# Patient Record
Sex: FEMALE | Race: BLACK OR AFRICAN AMERICAN | NOT HISPANIC OR LATINO | Employment: PART TIME | URBAN - METROPOLITAN AREA
[De-identification: names, ages, dates, MRNs, and addresses within clinical notes are randomized per-mention and may not be internally consistent; named-entity substitution may affect disease eponyms.]

---

## 2018-06-15 ENCOUNTER — CONVERSION ENCOUNTER (OUTPATIENT)
Dept: MAMMOGRAPHY | Facility: CLINIC | Age: 63
End: 2018-06-15

## 2018-06-27 ENCOUNTER — TELEPHONE (OUTPATIENT)
Dept: FAMILY MEDICINE CLINIC | Facility: CLINIC | Age: 63
End: 2018-06-27

## 2018-06-27 DIAGNOSIS — R92.2 INCONCLUSIVE MAMMOGRAPHY: Primary | ICD-10-CM

## 2018-06-28 ENCOUNTER — TELEPHONE (OUTPATIENT)
Dept: FAMILY MEDICINE CLINIC | Facility: CLINIC | Age: 63
End: 2018-06-28

## 2018-06-29 DIAGNOSIS — N63.10 MASS OF BREAST, RIGHT: Primary | ICD-10-CM

## 2018-08-02 ENCOUNTER — TELEPHONE (OUTPATIENT)
Dept: OBGYN CLINIC | Facility: CLINIC | Age: 63
End: 2018-08-02

## 2018-08-08 PROBLEM — H49.20 ABDUCENS NERVE PALSY: Status: ACTIVE | Noted: 2017-08-24

## 2018-08-08 PROBLEM — D53.1 MEGALOBLASTIC ANEMIA DUE TO VITAMIN B12 DEFICIENCY: Status: ACTIVE | Noted: 2017-08-24

## 2018-08-08 PROBLEM — I10 BENIGN ESSENTIAL HYPERTENSION: Status: ACTIVE | Noted: 2017-07-27

## 2018-08-08 RX ORDER — LISINOPRIL 20 MG/1
TABLET ORAL
COMMUNITY
Start: 2018-06-29 | End: 2018-08-09 | Stop reason: SINTOL

## 2018-08-08 RX ORDER — FOLIC ACID 1 MG/1
TABLET ORAL EVERY 24 HOURS
COMMUNITY
Start: 2018-04-25 | End: 2018-08-09 | Stop reason: SDUPTHER

## 2018-08-08 RX ORDER — ATORVASTATIN CALCIUM 80 MG/1
TABLET, FILM COATED ORAL EVERY 24 HOURS
COMMUNITY
Start: 2018-04-25 | End: 2018-08-09 | Stop reason: SDUPTHER

## 2018-08-09 ENCOUNTER — OFFICE VISIT (OUTPATIENT)
Dept: FAMILY MEDICINE CLINIC | Facility: CLINIC | Age: 63
End: 2018-08-09
Payer: COMMERCIAL

## 2018-08-09 VITALS
HEIGHT: 66 IN | TEMPERATURE: 96.1 F | HEART RATE: 81 BPM | WEIGHT: 267 LBS | RESPIRATION RATE: 16 BRPM | BODY MASS INDEX: 42.91 KG/M2 | DIASTOLIC BLOOD PRESSURE: 100 MMHG | OXYGEN SATURATION: 98 % | SYSTOLIC BLOOD PRESSURE: 144 MMHG

## 2018-08-09 DIAGNOSIS — Z12.4 ENCOUNTER FOR SCREENING FOR CERVICAL CANCER: ICD-10-CM

## 2018-08-09 DIAGNOSIS — Z86.2 HISTORY OF ANEMIA: ICD-10-CM

## 2018-08-09 DIAGNOSIS — J30.0 VASOMOTOR RHINITIS: ICD-10-CM

## 2018-08-09 DIAGNOSIS — Z12.11 ENCOUNTER FOR SCREENING COLONOSCOPY: ICD-10-CM

## 2018-08-09 DIAGNOSIS — Z78.9 ACE INHIBITOR INTOLERANCE: ICD-10-CM

## 2018-08-09 DIAGNOSIS — I10 BENIGN ESSENTIAL HYPERTENSION: Primary | ICD-10-CM

## 2018-08-09 DIAGNOSIS — E78.2 MIXED HYPERLIPIDEMIA: ICD-10-CM

## 2018-08-09 PROBLEM — D53.1 MEGALOBLASTIC ANEMIA DUE TO VITAMIN B12 DEFICIENCY: Status: RESOLVED | Noted: 2017-08-24 | Resolved: 2018-08-09

## 2018-08-09 PROBLEM — H49.20 ABDUCENS NERVE PALSY: Status: RESOLVED | Noted: 2017-08-24 | Resolved: 2018-08-09

## 2018-08-09 PROCEDURE — 99214 OFFICE O/P EST MOD 30 MIN: CPT | Performed by: FAMILY MEDICINE

## 2018-08-09 PROCEDURE — 3008F BODY MASS INDEX DOCD: CPT | Performed by: FAMILY MEDICINE

## 2018-08-09 PROCEDURE — 3725F SCREEN DEPRESSION PERFORMED: CPT | Performed by: FAMILY MEDICINE

## 2018-08-09 RX ORDER — ASPIRIN 81 MG/1
81 TABLET ORAL DAILY
Qty: 30 TABLET | Refills: 0
Start: 2018-08-09 | End: 2018-12-11

## 2018-08-09 RX ORDER — LOSARTAN POTASSIUM 50 MG/1
50 TABLET ORAL DAILY
Qty: 30 TABLET | Refills: 3 | Status: SHIPPED | OUTPATIENT
Start: 2018-08-09 | End: 2018-12-11 | Stop reason: SDUPTHER

## 2018-08-09 RX ORDER — FOLIC ACID 1 MG/1
1 TABLET ORAL EVERY 24 HOURS
Qty: 30 TABLET | Refills: 3 | Status: SHIPPED | OUTPATIENT
Start: 2018-08-09 | End: 2018-12-11 | Stop reason: SDUPTHER

## 2018-08-09 RX ORDER — FLUTICASONE PROPIONATE 50 MCG
2 SPRAY, SUSPENSION (ML) NASAL DAILY
Qty: 16 G | Refills: 1 | Status: SHIPPED | OUTPATIENT
Start: 2018-08-09 | End: 2018-11-21 | Stop reason: SDUPTHER

## 2018-08-09 RX ORDER — ATORVASTATIN CALCIUM 80 MG/1
80 TABLET, FILM COATED ORAL EVERY 24 HOURS
Qty: 30 TABLET | Refills: 3 | Status: SHIPPED | OUTPATIENT
Start: 2018-08-09 | End: 2018-12-11 | Stop reason: SDUPTHER

## 2018-08-09 RX ORDER — LISINOPRIL 20 MG/1
20 TABLET ORAL DAILY
Qty: 30 TABLET | Refills: 3 | Status: CANCELLED | OUTPATIENT
Start: 2018-08-09

## 2018-08-09 NOTE — ASSESSMENT & PLAN NOTE
-Suboptimal control due to running out of medication  -Will change medications due to possible ACE inhibitor cough  -Follow-up in 6 weeks to reevaluate blood pressure  -Handout given for DASH diet

## 2018-08-09 NOTE — PROGRESS NOTES
Patient ID: Wero Duran is a 59 y o  female  Chief Complaint: "Ran out of medication "    ASSESSMENT/PLAN    Notes for this visit:    ACE inhibitor intolerance  -Dry cough  -Stop lisinopril, start losartan    Benign essential hypertension  -Suboptimal control due to running out of medication  -Will change medications due to possible ACE inhibitor cough  -Follow-up in 6 weeks to reevaluate blood pressure  -Handout given for DASH diet    Encounter for screening for cervical cancer   -Risks and benefits explained in detail  Patient agrees   -Patient agrees to have PAP smear done in office  Vasomotor rhinitis  -Start flonase  -Will consider atrovent 0 03% if symptoms persist      Beccapolinaannetta Gaspar was seen today for hypertension  Diagnoses and all orders for this visit:    Benign essential hypertension  -     Comprehensive metabolic panel; Future  -     UA w Reflex to Microscopic w Reflex to Culture; Future  -     losartan (COZAAR) 50 mg tablet; Take 1 tablet (50 mg total) by mouth daily    History of anemia  -     CBC and differential; Future  -     Vitamin B12/Folate, Serum Panel; Future    Mixed hyperlipidemia  -     Lipid panel; Future  -     Comprehensive metabolic panel; Future    Encounter for screening colonoscopy  -     Ambulatory referral to Gastroenterology; Future    Vasomotor rhinitis  -     fluticasone (FLONASE) 50 mcg/act nasal spray; 2 sprays into each nostril daily      SUBJECTIVE    Patient here for refill of medication  Ran out of lisinopril 2 days ago  Also complains of right sided eye and nasal watering in cold rooms  Occurs daily  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, 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  Associated with post-nasal drip, dry coughing, and occasional sneezing  Denies eye redness or burning  She has tried sudafed when her symptoms are very bothersome, which resolves her symptoms      Reports that dry cough can occur anywhere, isolated from above symptoms  Coughs intermittently throughout day but has coughing spells at night  This started after she started lisinopril in July 2017  The following portions of the patient's history were reviewed and updated as appropriate: allergies, current medications, past family history, past medical history, past social history, past surgical history and problem list     Review of Systems   Constitutional: Negative for chills, fatigue and fever  Respiratory: Negative for shortness of breath  Cardiovascular: Negative for chest pain  Gastrointestinal: Negative for blood in stool, constipation, diarrhea, nausea and vomiting  OBJECTIVE    Vitals: Blood pressure 144/100, pulse 81, temperature (!) 96 1 °F (35 6 °C), temperature source Tympanic, resp  rate 16, height 5' 6" (1 676 m), weight 121 kg (267 lb), SpO2 98 %  Physical Exam   Constitutional: She is oriented to person, place, and time  She appears well-developed and well-nourished  No distress  HENT:   Head: Normocephalic and atraumatic  Eyes: EOM are normal  Pupils are equal, round, and reactive to light  Cardiovascular: Normal rate, regular rhythm and normal heart sounds  Exam reveals no gallop and no friction rub  No murmur heard  Pulmonary/Chest: Effort normal and breath sounds normal  No respiratory distress  She has no wheezes  She has no rales  Musculoskeletal: Normal range of motion  Neurological: She is alert and oriented to person, place, and time  Gait normal    Skin: Skin is warm and dry  Psychiatric: She has a normal mood and affect  Her behavior is normal  Judgment and thought content normal    Vitals reviewed

## 2018-08-09 NOTE — ASSESSMENT & PLAN NOTE
-Risks and benefits explained in detail  Patient agrees   -Patient agrees to have PAP smear done in office

## 2018-08-09 NOTE — PATIENT INSTRUCTIONS
DASH Eating Plan   AMBULATORY CARE:   The DASH (Dietary Approaches to Stop Hypertension) Eating Plan  is designed to help prevent or lower high blood pressure  It can also help to lower LDL (bad) cholesterol and decrease your risk for heart disease  The plan is low in sodium, sugar, unhealthy fats, and total fat  It is high in potassium, calcium, magnesium, and fiber  These nutrients are added when you eat more fruits, vegetables, and whole grains  Your sodium limit each day: Your dietitian will tell you how much sodium is safe for you to have each day  People with high blood pressure should have no more than 1,500 to 2,300 mg of sodium in a day  A teaspoon (tsp) of salt has 2,300 mg of sodium  This may seem like a difficult goal, but small changes to the foods you eat can make a big difference  Your healthcare provider or dietitian can help you create a meal plan that follows your sodium limit  How to limit sodium:   · Read food labels  Food labels can help you choose foods that are low in sodium  The amount of sodium is listed in milligrams (mg)  The % Daily Value (DV) column tells you how much of your daily needs are met by 1 serving of the food for each nutrient listed  Choose foods that have less than 5% of the DV of sodium  These foods are considered low in sodium  Foods that have 20% or more of the DV of sodium are considered high in sodium  Avoid foods that have more than 300 mg of sodium in each serving  Choose foods that say low-sodium, reduced-sodium, or no salt added on the food label  · Avoid salt  Do not salt food at the table, and add very little salt to foods during cooking  Use herbs and spices, such as onions, garlic, and salt-free seasonings to add flavor to foods  Try lemon or lime juice or vinegar to give foods a tart flavor  Use hot peppers or a small amount of hot pepper sauce to add a spicy flavor to foods  · Ask about salt substitutes    Ask your healthcare provider if you may use salt substitutes  Some salt substitutes have ingredients that can be harmful if you have certain health conditions  · Choose foods carefully at restaurants  Meals from restaurants, especially fast food restaurants, are often high in sodium  Some restaurants have nutrition information that tells you the amount of sodium in their foods  Ask to have your food prepared with less, or no salt  What you need to know about fats:   · Include healthy fats  Examples are unsaturated fats and omega-3 fatty acids  Unsaturated fats are found in soybean, canola, olive, or sunflower oil, and liquid and soft tub margarines  Omega-3 fatty acids are found in fatty fish, such as salmon, tuna, mackerel, and sardines  It is also found in flaxseed oil and ground flaxseed  · Avoid unhealthy fats  Do not eat unhealthy fats, such as saturated fats and trans fats  Saturated fats are found in foods that contain fat from animals  Examples are fatty meats, whole milk, butter, cream, and other dairy foods  It is also found in shortening, stick margarine, palm oil, and coconut oil  Trans fats are found in fried foods, crackers, chips, and baked goods made with margarine or shortening  Foods to include: With the DASH eating plan, you need to eat a certain number of servings from each food group  This will help you get enough of certain nutrients and limit others  The amount of servings you should eat depends on how many calories you need  Your dietitian can tell you how many calories you need  The number of servings listed next to the food groups below are for people who need about 2,000 calories each day    · Grains:  6 to 8 servings (3 of these servings should be whole-grain foods)    ¨ 1 slice of whole-grain bread     ¨ 1 ounce of dry cereal    ¨ ½ cup of cooked cereal, pasta, or brown rice    · Vegetables and fruits:  4 to 5 servings of fruits and 4 to 5 servings of vegetables    ¨ 1 medium fruit    ¨ ½ cup of frozen, canned (no added salt), or chopped fresh vegetables     ¨ ½ cup of fresh, frozen, dried, or canned fruit (canned in light syrup or fruit juice)    ¨ ½ cup of vegetable or fruit juice    · Dairy:  2 to 3 servings    ¨ 1 cup of nonfat (skim) or 1% milk    ¨ 1½ ounces of fat-free or low-fat cheese    ¨ 6 ounces of nonfat or low-fat yogurt    · Lean meat, poultry, and fish:  6 ounces or less    [employer] (chicken, turkey) with no skin    ¨ Fish (especially fatty fish, such as salmon, fresh tuna, or mackerel)    ¨ Lean beef and pork (loin, round, extra lean hamburger)    ¨ Egg whites and egg substitutes    · Nuts, seeds, and legumes:  4 to 5 servings each week    ¨ ½ cup of cooked beans and peas    ¨ 1½ ounces of unsalted nuts    ¨ 2 tablespoons of peanut butter or seeds    · Sweets and added sugars:  5 or less each week    ¨ 1 tablespoon of sugar, jelly, or jam    ¨ ½ cup of sorbet or gelatin    ¨ 1 cup of lemonade    · Fats:  2 to 3 servings each week    ¨ 1 teaspoon of soft margarine or vegetable oil    ¨ 1 tablespoon of mayonnaise    ¨ 2 tablespoons of salad dressing  Foods to avoid:   · Grains:      [employer], such as doughnuts, pastries, cookies, and biscuits (high in fat and sugar)    ¨ Mixes for cornbread and biscuits, packaged foods, such as bread stuffing, rice and pasta mixes, macaroni and cheese, and instant cereals (high in sodium)    · Fruits and vegetables:      ¨ Regular, canned vegetables (high in sodium)    ¨ Sauerkraut, pickled vegetables, and other foods prepared in brine (high in sodium)    ¨ Fried vegetables or vegetables in butter or high-fat sauces    ¨ Fruit in cream or butter sauce (high in fat)    · Dairy:      ¨ Whole milk, 2% milk, and cream (high in fat)    ¨ Regular cheese and processed cheese (high in fat and sodium)    · Meats and protein foods:      ¨ Smoked or cured meat, such as corned beef, spears, ham, hot dogs, and sausage (high in fat and sodium)    ¨ Canned beans and canned meats or spreads, such as potted meats, sardines, anchovies, and imitation seafood (high in sodium)    ¨ Deli or lunch meats, such as bologna, ham, turkey, and roast beef (high in sodium)    ¨ High-fat meat (T-bone steak, regular hamburger, and ribs)    ¨ Whole eggs and egg yolks (high in fat)    · Other:      ¨ Seasonings made with salt, such as garlic salt, celery salt, onion salt, seasoned salt, meat tenderizers, and monosodium glutamate (MSG)    ¨ Miso soup and canned or dried soup mixes (high in sodium)    ¨ Regular soy sauce, barbecue sauce, teriyaki sauce, steak sauce, Worcestershire sauce, and most flavored vinegars (high in sodium)    ¨ Regular condiments, such as mustard, ketchup, and salad dressings (high in sodium)    ¨ Gravy and sauces, such as Roland or cheese sauces (high in sodium and fat)    ¨ Drinks high in sugar, such as soda or fruit drinks    [employer] foods, such as salted chips, popcorn, pretzels, pork rinds, salted crackers, and salted nuts    ¨ Frozen foods, such as dinners, entrees, vegetables with sauces, and breaded meats (high in sodium)  Other guidelines to follow:   · Maintain a healthy weight  Your risk for heart disease is higher if you are overweight  Your healthcare provider may suggest that you lose weight if you are overweight  You can lose weight by eating fewer calories and foods that have added sugars and fat  The DASH meal plan can help you do this  Decrease calories by eating smaller portions at each meal and fewer snacks  Ask your healthcare provider for more information about how to lose weight  · Exercise regularly  Regular exercise can help you reach or maintain a healthy weight  Regular exercise can also help decrease your blood pressure and improve your cholesterol levels  Get 30 minutes or more of moderate exercise each day of the week  To lose weight, get at least 60 minutes of exercise  Talk to your healthcare provider about the best exercise program for you      · Limit alcohol  Women should limit alcohol to 1 drink a day  Men should limit alcohol to 2 drinks a day  A drink of alcohol is 12 ounces of beer, 5 ounces of wine, or 1½ ounces of liquor  © 2017 [address] Information is for End User's use only and may not be sold, redistributed or otherwise used for commercial purposes  All illustrations and images included in CareNotes® are the copyrighted property of A D A M , Inc  or David Goodwin  The above information is an  only  It is not intended as medical advice for individual conditions or treatments  Talk to your doctor, nurse or pharmacist before following any medical regimen to see if it is safe and effective for you  Pap Smear   AMBULATORY CARE:   A Pap smear,  or Pap test, is used to screen for cervical cancer  It is also used to find precancerous and cancerous cells of the vulva and vagina  Prepare for a Pap smear: The best time to schedule the test is right after your period stops  Do not have a Pap smear during your monthly period  During a Pap smear:   · You will lie on your back and place your feet on footrests called stirrups  Your healthcare provider will gently insert a device called a speculum into your vagina  The speculum is used to open the walls of your vagina so he can see your cervix  · Your healthcare provider will gently scrape your cervix and vaginal areas for cell samples  The samples are placed in a container with liquid or on a glass slide  They are sent to a lab and examined for abnormal cells  A test for Human Papillomavirus (HPV) may be done at the same time  HPV is a sexually transmitted virus that can cause changes in cervical cells  After your Pap smear:  Your healthcare provider will tell you when you can expect your Pap smear results  You may have some spotting the day of your procedure  When to get a Pap smear:  Pap smears are usually done every 3 to 5 years depending on your age   You may need a Pap smear more often if you have any of the following:  · Positive test result for the human papillomavirus (HPV)    · A history of cervical cancer    · HIV    · A weak immune system    · Exposure to diethylstilbestrol (DELL) medicine when your mother was pregnant with you  © 2017 [address] [employer] Information is for End User's use only and may not be sold, redistributed or otherwise used for commercial purposes  All illustrations and images included in CareNotes® are the copyrighted property of [employer]  or David Goodwin  The above information is an  only  It is not intended as medical advice for individual conditions or treatments  Talk to your doctor, nurse or pharmacist before following any medical regimen to see if it is safe and effective for you  Colonoscopy   AMBULATORY CARE:   What you need to know about a colonoscopy:  A colonoscopy is a procedure to examine the inside of your colon (intestine) with a scope  A scope is a flexible tube with a small light and camera on the end  Polyps or tissue growths may be removed during your colonoscopy  What you need to do the week before your colonoscopy: You will need to stop taking medicines that contain aspirin or iron for 7 days before your colonoscopy  If you take anticoagulants, such as warfarin, ask when you should stop taking it  Make plans for someone to drive you home after your procedure  How to prepare for your colonoscopy: Your healthcare provider will have you prepare your bowels before your procedure  Your bowels will need to be empty before your procedure to allow him to clearly see your colon  You will need to do the following the day before your procedure:  · Have only clear liquids  for the entire day before your colonoscopy  Clear liquid diet includes clear fruit juices and broths, clear flavored gelatin, and hard candy   It also includes coffee, tea, carbonated beverages, and clear sports drinks  · Follow your bowel prep as directed  There are many different preparations that can be given before a colonoscopy  Some are given over 2 hours and others over 6 hours  Some are given earlier in the afternoon the day before the colonoscopy  Others are given the day before and then the morning of the colonoscopy  With any bowel prep, stay close to the bathroom  This liquid will cause your bowels to move frequently  · An enema  may be needed  Your healthcare provider may tell you to use an enema to help clean out your bowels  · Do not eat or drink anything after midnight  This will help prevent problems that can happen if you vomit while under anesthesia  What will happen during your colonoscopy:   · You will be given medicine to help you relax  You will lie on your left side and raise one or both knees toward your chest  Your healthcare provider will examine your anus and use a finger to check your rectum  You may need another enema if your bowel is not empty  The scope will be lubricated and gently placed into your anus  It will then be passed through your rectum and into your colon  Water or air will be put into your colon to help clean or expand it  This is done so your healthcare provider can see your colon clearly  · Tissue samples may be taken from the walls of your bowel and sent to a lab for tests  If you have a polyp, your healthcare provider will pass a wire loop through the scope and use it to hold the polyp  The polyp is then burned or cut off the wall of your colon  Removed polyps are sent to a lab for tests  Pictures of your colon may be taken during the procedure  The scope will be removed when the procedure is done  What will happen after your colonoscopy:   · Rest after your procedure  You may feel bloated, have some gas and abdominal discomfort  You may need to lie on your right side with a heating pad on your abdomen   You may need to take short walks to help move the gas out  Eat small meals, if you feel bloated  Do not drive or make important decisions until the day after your procedure  · You may have polyps removed  Do not take aspirin or go on long car trips for 7 days after your procedure  Ask your healthcare provider about any other limits after your procedure  Risks of a colonoscopy: You may have pain or bleeding after the scope or polyps are removed  You may also have a slow heartbeat, decreased blood pressure, or increased sweating  Your colon may tear due to the increased pressure from the scope and other instruments  This may cause bowel contents to leak out of your colon and into your abdomen  If this happens, you will need to stay in the hospital and have surgery on your colon  Seek care immediately if:   · You have a large amount of bright red blood in your bowel movements  · Your abdomen is hard and firm and you have severe pain  · You have sudden trouble breathing  Contact your healthcare provider if:   · You develop a rash or hives  · You have a fever within 24 hours of your procedure  · You have not had a bowel movement for 3 days after your procedure  · You have questions or concerns about your condition or care  Activity:   · Do not lift, strain, or run  for 3 days after your procedure  · Rest after your procedure  You have been given medicine to relax you  Do not  drive or make important decisions until the day after your procedure  Return to your normal activity as directed  · Relieve gas and discomfort from bloating  by lying on your right side with a heating pad on your abdomen  You may need to take short walks to help the gas move out  Eat small meals until bloating is relieved  If you had polyps removed: For 7 days after your procedure:  · Do not  take aspirin  · Do not  go on long car rides  Help prevent constipation:   · Eat a variety of healthy foods    Healthy foods include fruit, vegetables, whole-grain breads, low-fat dairy products, beans, lean meat, and fish  Ask if you need to be on a special diet  Your healthcare provider may recommend that you eat high-fiber foods such as cooked beans  Fiber helps you have regular bowel movements  · Drink liquids as directed  Adults should drink between 9 and 13 eight-ounce cups of liquid every day  Ask what amount is best for you  For most people, good liquids to drink are water, juice, and milk  · Exercise as directed  Talk to your healthcare provider about the best exercise plan for you  Exercise can help prevent constipation, decrease your blood pressure and improve your health  Follow up with your healthcare provider as directed:  Write down your questions so you remember to ask them during your visits  © 2017 [address] [facility] Information is for End User's use only and may not be sold, redistributed or otherwise used for commercial purposes  All illustrations and images included in CareNotes® are the copyrighted property of A D A M , Inc  or David Goodwin  The above information is an  only  It is not intended as medical advice for individual conditions or treatments  Talk to your doctor, nurse or pharmacist before following any medical regimen to see if it is safe and effective for you  How to Use Nasal Spray   AMBULATORY CARE:   Nasal sprays  are available with or without a doctor's order  Use them as directed  Contact your healthcare provider if:   · Your nose burns, stings, or is irritated after you use the spray  · Your nose starts to bleed after you use the spray  · You have questions or concerns about your condition or care  How to use nasal spray:  Read the instructions carefully before you use nasal spray  Store nasal sprays at room temperature and away from heat, moisture, and direct light  Wash your hands before and after you use nasal spray  Always blow your nose gently before you use nasal spray   This will help the medicine get deeply into your nose  · Remove the cap and shake the bottle  · Prime the bottle by spraying 1 time to get it ready  · Tilt your head until your chin is about USP to your chest  Insert the nozzle into your nostril  · Point the nozzle slightly toward the side of your nostril, away from the middle of your nose  · Squeeze the bottle or push down on the top to spray the medicine into your nostril  · As you spray, breathe in through your nose  Breathe slowly so you do not get too much medicine quickly  · Repeat these steps for the other nostril, or as directed  · If possible, do not sneeze or blow your nose until a few minutes after you use the nasal spray  Follow up with your healthcare provider as directed:  Write down your questions so you remember to ask them during your visits  © 2017 [address] [employer] Information is for End User's use only and may not be sold, redistributed or otherwise used for commercial purposes  All illustrations and images included in CareNotes® are the copyrighted property of [employer]  or David Goodwin  The above information is an  only  It is not intended as medical advice for individual conditions or treatments  Talk to your doctor, nurse or pharmacist before following any medical regimen to see if it is safe and effective for you

## 2018-11-09 ENCOUNTER — TELEPHONE (OUTPATIENT)
Dept: OBGYN CLINIC | Facility: CLINIC | Age: 63
End: 2018-11-09

## 2018-11-15 ENCOUNTER — HOSPITAL ENCOUNTER (OUTPATIENT)
Dept: MAMMOGRAPHY | Facility: CLINIC | Age: 63
Discharge: HOME | End: 2018-11-15
Payer: COMMERCIAL

## 2018-11-15 ENCOUNTER — HOSPITAL ENCOUNTER (OUTPATIENT)
Dept: ULTRASOUND IMAGING | Facility: HOSPITAL | Age: 63
Discharge: HOME | End: 2018-11-15
Payer: COMMERCIAL

## 2018-11-15 VITALS — WEIGHT: 267 LBS | HEIGHT: 66 IN | BODY MASS INDEX: 42.91 KG/M2

## 2018-11-15 DIAGNOSIS — N63.10 LUMP OF BREAST, RIGHT: ICD-10-CM

## 2018-11-15 DIAGNOSIS — N63.10 MASS OF BREAST, RIGHT: ICD-10-CM

## 2018-11-15 PROCEDURE — 77065 DX MAMMO INCL CAD UNI: CPT

## 2018-11-15 PROCEDURE — 76642 ULTRASOUND BREAST LIMITED: CPT

## 2018-11-15 NOTE — PROGRESS NOTES
Patient met with Dr Riri Miller @ [facility] ([facility]) regarding recommendation for;      __x___ RIGHT ______LEFT      __x___Ultrasound guided  ______Stereotactic  Breast biopsy  __x___Verbalized understanding        Blood thinners:  __x___yes _____no  (ASA 81 mg)    Date stopped: ____n/a_______    Biopsy teaching sheet reviewed with pt via telephone:  ___x____yes ______no

## 2018-11-21 DIAGNOSIS — J30.0 VASOMOTOR RHINITIS: ICD-10-CM

## 2018-11-21 RX ORDER — FLUTICASONE PROPIONATE 50 MCG
2 SPRAY, SUSPENSION (ML) NASAL DAILY
Qty: 16 G | Refills: 2 | Status: SHIPPED | OUTPATIENT
Start: 2018-11-21 | End: 2019-08-16 | Stop reason: SDUPTHER

## 2018-11-23 ENCOUNTER — HOSPITAL ENCOUNTER (OUTPATIENT)
Dept: ULTRASOUND IMAGING | Facility: CLINIC | Age: 63
Discharge: HOME | End: 2018-11-23
Admitting: RADIOLOGY
Payer: COMMERCIAL

## 2018-11-23 ENCOUNTER — HOSPITAL ENCOUNTER (OUTPATIENT)
Dept: MAMMOGRAPHY | Facility: CLINIC | Age: 63
Discharge: HOME | End: 2018-11-23

## 2018-11-23 VITALS — DIASTOLIC BLOOD PRESSURE: 78 MMHG | HEART RATE: 60 BPM | SYSTOLIC BLOOD PRESSURE: 138 MMHG

## 2018-11-23 DIAGNOSIS — R92.8 ABNORMAL MAMMOGRAM: ICD-10-CM

## 2018-11-23 PROCEDURE — 88305 TISSUE EXAM BY PATHOLOGIST: CPT | Performed by: PATHOLOGY

## 2018-11-23 PROCEDURE — 88342 IMHCHEM/IMCYTCHM 1ST ANTB: CPT | Performed by: PATHOLOGY

## 2018-11-23 PROCEDURE — 88361 TUMOR IMMUNOHISTOCHEM/COMPUT: CPT | Performed by: PATHOLOGY

## 2018-11-23 PROCEDURE — 19083 BX BREAST 1ST LESION US IMAG: CPT

## 2018-11-23 PROCEDURE — 88341 IMHCHEM/IMCYTCHM EA ADD ANTB: CPT | Performed by: PATHOLOGY

## 2018-11-23 RX ORDER — LIDOCAINE HYDROCHLORIDE 10 MG/ML
4 INJECTION, SOLUTION INFILTRATION; PERINEURAL ONCE
Status: COMPLETED | OUTPATIENT
Start: 2018-11-23 | End: 2018-11-23

## 2018-11-23 RX ADMIN — LIDOCAINE HYDROCHLORIDE 4 ML: 10 INJECTION, SOLUTION INFILTRATION; PERINEURAL at 11:19

## 2018-11-23 NOTE — PROGRESS NOTES
Procedure type:    ____x_ultrasound guided _____stereotactic    Breast:    _____Left ___x__Right    Location:[id number]    Needle: 12ga Shiraz    # of passes:4    Clip: Cormark    Performed by: Dr Lindsay Arciniega held for 5 minutes by:  Sonya CoelhoPCA)    Steri Strips:    __X___yes _____no    Band aid:    __X___yes_____no    Tape and guaze:    _____yes ___X__no    Tolerated procedure:    ____X_yes _____no

## 2018-11-23 NOTE — PROGRESS NOTES
Patient arrived via:    ___x__ambulatory    _____wheelchair    _____stretcher      Domestic violence screen    ___x___negative______positive    Breast Implants:    _______yes _____x___no

## 2018-11-23 NOTE — PROGRESS NOTES
Ice pack given:    __X___yes _____no    Discharge instructions signed by patient:    __X___yes _____no    Discharge instructions given to patient:    __X___yes _____no    Discharged via:    __X___amulatory    _____wheelchair    _____stretcher    Stable on discharge: __X_YES___NO      POST LARGE CORE BREAST BIOPSY PATIENT INFORMATION    1  Place an ice pack inside your bra over the top of the dressing every hour for 20 minutes (20 minutes on, 60 minutes off)  Do this until bedtime  2  Do not shower or bathe until the following morning  3  You may bathe your breast carefully with the steri-strips in place  Be careful    Not to loosen them  The steri-strips will fall off in 3-5 days  4  You may have mild discomfort, and you may have some bruising where the   Needle entered the skin  This should clear within 5-7 days  5  If you need medicine for discomfort, take acetaminophen products such as   Tylenol  You may also take Advil or Motrin products  6  Do not participate in strenuous activities such as-tennis, aerobics, skiing,  Weight lifting, etc  for 24 hours  Refrain from swimming/soaking for 72 hours  7  Wearing a bra for sleeping may be more comfortable for the first 24-48 hours  8  Watch for continued bleeding, pain or fever over 101; please call with any questions or concerns  For procedures done at the [facility]  [facility] call:  Manuel Mendez RN at [phone]  Tashia Esparza RN at [phone]                    *After 4 PM call the Interventional Radiology Department                    [phone] and ask to speak with the nurse on call  For procedures done at the [address] [place] [address] call:         Abiodun Gan RN at   *After 4 PM call the Interventional Radiology Department   [phone] and ask to speak with the nurse on call  For procedures done at [facility] call:   The Radiology Nurse at [phone]  *After 4 PM call your physician, or go to the Emergency Department  9          The final results of your biopsy are usually available within one week

## 2018-12-05 ENCOUNTER — TELEPHONE (OUTPATIENT)
Dept: MAMMOGRAPHY | Facility: CLINIC | Age: 63
End: 2018-12-05

## 2018-12-07 ENCOUNTER — TRANSCRIBE ORDERS (OUTPATIENT)
Dept: ADMINISTRATIVE | Facility: HOSPITAL | Age: 63
End: 2018-12-07

## 2018-12-11 ENCOUNTER — HOSPITAL ENCOUNTER (OUTPATIENT)
Dept: RADIOLOGY | Facility: HOSPITAL | Age: 63
Discharge: HOME | End: 2018-12-11
Payer: COMMERCIAL

## 2018-12-11 ENCOUNTER — CONSULT (OUTPATIENT)
Dept: FAMILY MEDICINE CLINIC | Facility: CLINIC | Age: 63
End: 2018-12-11
Payer: COMMERCIAL

## 2018-12-11 ENCOUNTER — APPOINTMENT (OUTPATIENT)
Dept: PREADMISSION TESTING | Facility: HOSPITAL | Age: 63
End: 2018-12-11
Payer: COMMERCIAL

## 2018-12-11 VITALS
TEMPERATURE: 97.8 F | BODY MASS INDEX: 43.26 KG/M2 | HEART RATE: 62 BPM | RESPIRATION RATE: 20 BRPM | OXYGEN SATURATION: 97 % | DIASTOLIC BLOOD PRESSURE: 86 MMHG | WEIGHT: 268 LBS | SYSTOLIC BLOOD PRESSURE: 132 MMHG

## 2018-12-11 DIAGNOSIS — E78.2 MIXED HYPERLIPIDEMIA: ICD-10-CM

## 2018-12-11 DIAGNOSIS — Z01.818 PREOP TESTING: ICD-10-CM

## 2018-12-11 DIAGNOSIS — Z12.11 ENCOUNTER FOR SCREENING COLONOSCOPY: ICD-10-CM

## 2018-12-11 DIAGNOSIS — I10 BENIGN ESSENTIAL HYPERTENSION: ICD-10-CM

## 2018-12-11 DIAGNOSIS — Z86.2 HISTORY OF ANEMIA: ICD-10-CM

## 2018-12-11 DIAGNOSIS — Z01.810 PRE-PROCEDURAL CARDIOVASCULAR EXAMINATION: Primary | ICD-10-CM

## 2018-12-11 DIAGNOSIS — Z23 NEED FOR VACCINATION: ICD-10-CM

## 2018-12-11 DIAGNOSIS — Z01.818 PRE-OP EVALUATION: ICD-10-CM

## 2018-12-11 DIAGNOSIS — Z01.818 PREOP TESTING: Primary | ICD-10-CM

## 2018-12-11 PROBLEM — Z78.9 ACE INHIBITOR INTOLERANCE: Status: RESOLVED | Noted: 2018-08-09 | Resolved: 2018-12-11

## 2018-12-11 PROBLEM — Z12.4 ENCOUNTER FOR SCREENING FOR CERVICAL CANCER: Status: RESOLVED | Noted: 2018-08-09 | Resolved: 2018-12-11

## 2018-12-11 LAB
ALBUMIN SERPL BCP-MCNC: 3.8 G/DL (ref 3–5.2)
ALP SERPL-CCNC: 102 U/L (ref 43–122)
ALT SERPL W P-5'-P-CCNC: 27 U/L (ref 9–52)
ANION GAP SERPL CALCULATED.3IONS-SCNC: 8 MMOL/L (ref 5–14)
AST SERPL W P-5'-P-CCNC: 20 U/L (ref 14–36)
BASOPHILS # BLD AUTO: 0.1 THOUSANDS/ΜL (ref 0–0.1)
BASOPHILS NFR BLD AUTO: 1 % (ref 0–1)
BILIRUB SERPL-MCNC: 0.6 MG/DL
BUN SERPL-MCNC: 16 MG/DL (ref 5–25)
CALCIUM SERPL-MCNC: 9.2 MG/DL (ref 8.4–10.2)
CHLORIDE SERPL-SCNC: 107 MMOL/L (ref 97–108)
CO2 SERPL-SCNC: 25 MMOL/L (ref 22–30)
CREAT SERPL-MCNC: 0.58 MG/DL (ref 0.6–1.2)
EOSINOPHIL # BLD AUTO: 0.2 THOUSAND/ΜL (ref 0–0.4)
EOSINOPHIL NFR BLD AUTO: 2 % (ref 0–6)
ERYTHROCYTE [DISTWIDTH] IN BLOOD BY AUTOMATED COUNT: 14.3 %
GFR SERPL CREATININE-BSD FRML MDRD: 114 ML/MIN/1.73SQ M
GLUCOSE SERPL-MCNC: 98 MG/DL (ref 70–99)
HCT VFR BLD AUTO: 38.2 % (ref 36–46)
HGB BLD-MCNC: 12.4 G/DL (ref 12–16)
LYMPHOCYTES # BLD AUTO: 3.2 THOUSANDS/ΜL (ref 0.5–4)
LYMPHOCYTES NFR BLD AUTO: 33 % (ref 25–45)
MCH RBC QN AUTO: 28.2 PG (ref 26–34)
MCHC RBC AUTO-ENTMCNC: 32.5 G/DL (ref 31–36)
MCV RBC AUTO: 87 FL (ref 80–100)
MONOCYTES # BLD AUTO: 0.6 THOUSAND/ΜL (ref 0.2–0.9)
MONOCYTES NFR BLD AUTO: 6 % (ref 1–10)
NEUTROPHILS # BLD AUTO: 5.6 THOUSANDS/ΜL (ref 1.8–7.8)
NEUTS SEG NFR BLD AUTO: 58 % (ref 45–65)
PLATELET # BLD AUTO: 297 THOUSANDS/UL (ref 150–450)
PLATELET BLD QL SMEAR: ADEQUATE
PMV BLD AUTO: 9.4 FL (ref 8.9–12.7)
POTASSIUM SERPL-SCNC: 4.4 MMOL/L (ref 3.6–5)
PROT SERPL-MCNC: 7 G/DL (ref 5.9–8.4)
RBC # BLD AUTO: 4.4 MILLION/UL (ref 4–5.2)
RBC MORPH BLD: NORMAL
SODIUM SERPL-SCNC: 140 MMOL/L (ref 137–147)
WBC # BLD AUTO: 9.8 THOUSAND/UL (ref 4.5–11)

## 2018-12-11 PROCEDURE — 71046 X-RAY EXAM CHEST 2 VIEWS: CPT

## 2018-12-11 PROCEDURE — 99214 OFFICE O/P EST MOD 30 MIN: CPT | Performed by: FAMILY MEDICINE

## 2018-12-11 PROCEDURE — 90682 RIV4 VACC RECOMBINANT DNA IM: CPT | Performed by: FAMILY MEDICINE

## 2018-12-11 PROCEDURE — 80053 COMPREHEN METABOLIC PANEL: CPT

## 2018-12-11 PROCEDURE — 85025 COMPLETE CBC W/AUTO DIFF WBC: CPT

## 2018-12-11 PROCEDURE — 93005 ELECTROCARDIOGRAM TRACING: CPT

## 2018-12-11 PROCEDURE — 90471 IMMUNIZATION ADMIN: CPT | Performed by: FAMILY MEDICINE

## 2018-12-11 RX ORDER — FOLIC ACID 1 MG/1
1 TABLET ORAL EVERY 24 HOURS
Qty: 30 TABLET | Refills: 5 | Status: SHIPPED | OUTPATIENT
Start: 2018-12-11 | End: 2019-08-16 | Stop reason: SDUPTHER

## 2018-12-11 RX ORDER — ASPIRIN 81 MG/1
81 TABLET ORAL DAILY
Qty: 30 TABLET | Refills: 5 | Status: CANCELLED | OUTPATIENT
Start: 2018-12-11

## 2018-12-11 RX ORDER — ATORVASTATIN CALCIUM 80 MG/1
80 TABLET, FILM COATED ORAL EVERY 24 HOURS
Qty: 30 TABLET | Refills: 2 | Status: SHIPPED | OUTPATIENT
Start: 2018-12-11 | End: 2019-04-02 | Stop reason: SDUPTHER

## 2018-12-11 RX ORDER — LOSARTAN POTASSIUM 50 MG/1
50 TABLET ORAL DAILY
Qty: 30 TABLET | Refills: 2 | Status: SHIPPED | OUTPATIENT
Start: 2018-12-11 | End: 2019-04-02 | Stop reason: SDUPTHER

## 2018-12-11 NOTE — ASSESSMENT & PLAN NOTE
-BP is 132/86  -In the general population over 60, a goal BP <150/<90 is recommended  (JNC8, Grade A)  -Controlled - will continue with current treatment plan

## 2018-12-11 NOTE — ASSESSMENT & PLAN NOTE
Blood work was WNL  EKG and CXR results pending  Her revised risk cardiac index is 0 4%  Patient has >4 METs  Patient is low risk for this low risk procedure (partial mastectomy)

## 2018-12-11 NOTE — ASSESSMENT & PLAN NOTE
Talked to patient about colonoscopy as she has never had one  She will think about it  Will discuss it at follow-up appointment, about 6 weeks after her surgery

## 2018-12-11 NOTE — PRE-PROCEDURE INSTRUCTIONS
Pre-Surgery Instructions:   Medication Instructions    fluticasone (FLONASE) 50 mcg/act nasal spray Instructed patient per Anesthesia Guidelines   losartan (COZAAR) 50 mg tablet Instructed patient per Anesthesia Guidelines  Pre-op Showering Instructions for Surgery Patients    Before your operation, you play an important role in decreasing your risk for infection by washing with special antiseptic soap  This is an effective way to reduce bacteria on the skin which may help to prevent infections at the surgical site  Please read the following directions in advance  1  In the week before your operation, purchase a 4 ounce bottle of antiseptic soap containing chlorhexidine gluconate (CHG)  4%  Some brand names include: Aplicare®, Endure, and Hibiclens®  The cost is usually less than $5 00   For your convenience, the [employer] carries the soap   It may also be available at your doctors office or pre-admission testing center, and at most retail pharmacies   If you are allergic or sensitive to soaps containing CHG, please let your doctor know so another antiseptic can be suggested   CHG antiseptic soap is for external use only  2  The day before your operation, follow these instructions carefully to get ready   Please clean linens (sheets) on your bed; you should sleep on clean sheets after your evening shower   Get clean towels and washcloth ready - you need enough for 2 showers   Set aside clean underwear, pajamas, and clothing to wear after the showers     Reminders:   DO NOT use any other soap or body rinse on your skin during or after the antiseptic showers   DO NOT use lotion, powder, deodorant, or perfume/aftershave of any kind on your skin after your antiseptic shower   DO NOT shave any body parts in the 24 hours/day before your operation   DO NOT get the antiseptic soap in your eyes, ears, nose, mouth, or vaginal area    3   You will need to shower the night before AND the morning of your surgery  Shower 1:   The first evening before the operation, take the first shower   First, shampoo your hair with regular shampoo and rinse it completely before you use the antiseptic soap  After washing and rinsing your hair, rinse your body   Next, use a clean washcloth to apply the antiseptic soap and wash your body from the neck down to your toes using ½ bottle of the antiseptic soap   You will use the other ½ bottle for the second shower   Clean the area where your incision will be; lather this area well for about 2 minutes   If you are having head or neck surgery, wash areas with the antiseptic soap   Rinse yourself completely with running water   Use a clean towel to dry off   Wear clean underwear and clothing/pajamas  Shower 2   The morning of your operation, take the second shower following the same steps as Shower 1 using the second ½ of the bottle of antiseptic soap   Use clean cloths and towels to wash and dry yourself   Wear clean underwear and clothing

## 2018-12-11 NOTE — PROGRESS NOTES
Patient ID: Vilma Padron is a 59 y o  female  Chief Complaint: Pre-op clearance    Notes for this visit:    Pre-op evaluation  Blood work was WNL  EKG and CXR results pending  Her revised risk cardiac index is 0 4%  Patient has >4 METs  Patient is low risk for this low risk procedure (partial mastectomy)  Benign essential hypertension  -BP is 132/86  -In the general population over 60, a goal BP <150/<90 is recommended  (JNC8, Grade A)  -Controlled - will continue with current treatment plan  Encounter for screening colonoscopy  Talked to patient about colonoscopy as she has never had one  She will think about it  Will discuss it at follow-up appointment, about 6 weeks after her surgery  Mima Fontanez was seen today for pre-op exam     Diagnoses and all orders for this visit:    Need for vaccination  -     PREFERRED: influenza vaccine, [id number], quadrivalent, recombinant, PF, 0 5 mL, for patients 18 yr+ (FLUBLOK)    Benign essential hypertension  -     losartan (COZAAR) 50 mg tablet; Take 1 tablet (50 mg total) by mouth daily    Mixed hyperlipidemia  -     atorvastatin (LIPITOR) 80 mg tablet; Take 1 tablet (80 mg total) by mouth every 24 hours    History of anemia  -     folic acid (FOLVITE) 1 mg tablet; Take 1 tablet (1 mg total) by mouth every 24 hours    Pre-op evaluation    Encounter for screening colonoscopy      History:    HPI: Patient presents for pre-op clearance for partial mastectomy  1 6 cm invasive breast carcinoma was confirmed with US guided biopsy at the end of November  Surgeon ordered CXR, EKG and blood work  She does not experience any chest pain or shortness of breath with exercise  Reports good exercise capacity  Can walk several blocks daily to work  Climb the stairs at [facility] on weekends  Denies any symptoms with this activity  Has good social support at home  Talks to her daughter about her recent diagnosis  No family history of breast cancer that she knows of   Offers no complaints  Would like her medications refilled  The following portions of the patient's history were reviewed and updated as appropriate: allergies, current medications, past medical history, past social history, past surgical history and problem list     Review of Systems   Constitutional: Negative for activity change, appetite change, chills, fever and unexpected weight change  Respiratory: Negative for cough, chest tightness, shortness of breath and wheezing  Cardiovascular: Negative for chest pain, palpitations and leg swelling  Gastrointestinal: Negative for abdominal pain, blood in stool, diarrhea, nausea and vomiting  All other systems reviewed and are negative  Patient Active Problem List   Diagnosis    Benign essential hypertension    Vasomotor rhinitis    Mixed hyperlipidemia    Pre-op evaluation     Current Outpatient Prescriptions:     atorvastatin (LIPITOR) 80 mg tablet, Take 1 tablet (80 mg total) by mouth every 24 hours, Disp: 30 tablet, Rfl: 2    fluticasone (FLONASE) 50 mcg/act nasal spray, 2 sprays into each nostril daily, Disp: 16 g, Rfl: 2    folic acid (FOLVITE) 1 mg tablet, Take 1 tablet (1 mg total) by mouth every 24 hours, Disp: 30 tablet, Rfl: 5    losartan (COZAAR) 50 mg tablet, Take 1 tablet (50 mg total) by mouth daily, Disp: 30 tablet, Rfl: 2    Social History   Substance Use Topics    Smoking status: Never Smoker    Smokeless tobacco: Never Used      Comment: NO TOBACCO USE    Alcohol use No       PHYSICAL EXAM    Vitals:   Vitals:    12/11/18 1341   BP: 132/86   BP Location: Right arm   Patient Position: Sitting   Cuff Size: Large   Pulse: 62   Resp: 20   Temp: 97 8 °F (36 6 °C)   SpO2: 97%   Weight: 122 kg (268 lb)       Physical Exam   Constitutional: She is oriented to person, place, and time  She appears well-developed and well-nourished  No distress  HENT:   Head: Normocephalic and atraumatic  Neck: Normal range of motion     Cardiovascular: Normal rate, regular rhythm, normal heart sounds and intact distal pulses  Exam reveals no gallop and no friction rub  No murmur heard  Pulmonary/Chest: Effort normal and breath sounds normal  No respiratory distress  She has no wheezes  She has no rales  Musculoskeletal: Normal range of motion  She exhibits no edema  Neurological: She is alert and oriented to person, place, and time  Nursing note and vitals reviewed

## 2018-12-11 NOTE — TELEPHONE ENCOUNTER
Reorder diagnostic mammogram one breast right breast N63 10 into the new system lgallon Subjective       History of Present Illness    Chief Complaint   Patient presents with   • Gynecologic Exam     Last pap 16 neg/HPV neg.       Alysha Candelaria is a 37 y.o. female who presents for annual exam. No problems.  Menses are regular.  No bowel or bladder problems.  She has noticed a lot of hair loss and fatigue recently.  She has noticed it over the past 3-4 months.    OB History    Para Term  AB Living   3 2     1     SAB TAB Ectopic Molar Multiple Live Births   1                # Outcome Date GA Lbr Bernardo/2nd Weight Sex Delivery Anes PTL Lv   3 SAB            2 Para            1 Para                   The following portions of the patient's history were reviewed and updated as appropriate: allergies, current medications, past family history, past medical history, past social history, past surgical history and problem list.    Her menses are regular every 28-30 days, lasting 4-7 days.    Current contraception: vasectomy  History of abnormal Pap smear: no  Received Gardasil immunization: no  Perform regular self breast exam: no  Family history of uterine or ovarian cancer: no  Family History of colon cancer: no  Family history of breast cancer: no    Mammogram: not indicated.  Colonoscopy: not indicated.  DEXA: not indicated.  Last Pap: neg    Social History    Tobacco Use      Smoking status: Never Smoker    Exercise: not active  Calcium/Vitamin D: adequate intake    The following portions of the patient's history were reviewed and updated as appropriate: allergies, current medications, past family history, past medical history, past social history, past surgical history and problem list.    Review of Systems   Constitutional: Positive for fatigue.   HENT: Negative.    Eyes: Negative.    Respiratory: Negative.    Cardiovascular: Negative.    Gastrointestinal: Negative.    Endocrine: Negative.         Hair loss   Genitourinary: Negative.    Musculoskeletal: Negative.    Skin: Negative.   "  Allergic/Immunologic: Negative.    Neurological: Negative.    Hematological: Negative.    Psychiatric/Behavioral: Negative.          Objective   Physical Exam   Constitutional: She is oriented to person, place, and time. She appears well-developed and well-nourished.   Neck: No thyroid mass present.   Cardiovascular: Normal rate, regular rhythm and normal heart sounds.   Pulmonary/Chest: Effort normal and breath sounds normal. Right breast exhibits no mass, no nipple discharge, no skin change and no tenderness. Left breast exhibits no mass, no nipple discharge, no skin change and no tenderness.   Abdominal: Soft. There is no tenderness.   Genitourinary: Vagina normal and uterus normal. There is no rash or lesion on the right labia. There is no rash or lesion on the left labia. Cervix exhibits no motion tenderness, no discharge and no friability. Right adnexum displays no mass and no tenderness. Left adnexum displays no mass and no tenderness.   Neurological: She is alert and oriented to person, place, and time.   Psychiatric: She has a normal mood and affect. Her behavior is normal.   Vitals reviewed.      /70   Ht 158.8 cm (62.5\")   Wt 83 kg (183 lb)   LMP 12/05/2018   BMI 32.94 kg/m²     Assessment/Plan   Alysha was seen today for gynecologic exam.    Diagnoses and all orders for this visit:    Encounter for gynecological examination    Hair loss  -     TSH        Breast self exam technique reviewed and patient encouraged to perform self-exam monthly.  Discussed healthy lifestyle modifications.  Pap smear up to date  Recommended 30 minutes of aerobic exercise five times per week.  Discussed calcium needs to prevent osteoporosis  Check TSH for hair loss and fatigue.  Patient will continue to monitor and follow up with PCP for concerns.         "

## 2018-12-12 ENCOUNTER — TELEPHONE (OUTPATIENT)
Dept: FAMILY MEDICINE CLINIC | Facility: CLINIC | Age: 63
End: 2018-12-12

## 2018-12-12 LAB
ATRIAL RATE: 69 BPM
P AXIS: 67 DEGREES
PR INTERVAL: 150 MS
QRS AXIS: 59 DEGREES
QRSD INTERVAL: 78 MS
QT INTERVAL: 412 MS
QTC INTERVAL: 441 MS
T WAVE AXIS: 47 DEGREES
VENTRICULAR RATE: 69 BPM

## 2018-12-12 PROCEDURE — 93010 ELECTROCARDIOGRAM REPORT: CPT | Performed by: INTERNAL MEDICINE

## 2018-12-19 ENCOUNTER — DOCUMENTATION (OUTPATIENT)
Dept: HEMATOLOGY ONCOLOGY | Facility: CLINIC | Age: 63
End: 2018-12-19

## 2018-12-19 ENCOUNTER — ANESTHESIA EVENT (OUTPATIENT)
Dept: PERIOP | Facility: HOSPITAL | Age: 63
End: 2018-12-19
Payer: COMMERCIAL

## 2018-12-19 NOTE — PROGRESS NOTES
Oncology Navigator Visit  Breast Nurse Navigator    Spoke with patient today via telephone, introduced myself and discussed role of Breast Nurse Navigator throughout treatment into survivorship  Provided with information on available cancer support services  Pt denies any needs at this time, also with no questions or concerns  Is set up with [employer], daughter is supportive and will be available to assist patient with care at home as needed after scheduled surgery  Provided patient with my contact information to call as needed for any questions, concerns or needs            Potential Barriers:    Care Coordination:    Communication/Education:    Cultural/Muslim/Spiritual:    Health Promotion:    Insurance/Medical Costs:    Logistical:    Psychosocial/Distress/Behavioral:    Work/School:    Interventions:    Referrals:        Comments:

## 2018-12-19 NOTE — ANESTHESIA PREPROCEDURE EVALUATION
Review of Systems/Medical History  Patient summary reviewed  Chart reviewed  No history of anesthetic complications     Cardiovascular  Exercise tolerance (METS): >4,  Hyperlipidemia, Hypertension controlled,    Pulmonary  Not a smoker , No asthma , Sleep apnea ,        GI/Hepatic    GERD well controlled,             Endo/Other    Obesity  super morbid obesity   GYN  , Prior pregnancy/OB history : 5 Parity: 5,   Breast cancer   Comment: postmenopausal     Hematology  Anemia (CBC wnl) ,     Musculoskeletal    Arthritis     Neurology    CVA , no residual symptoms,    Psychology   Negative psychology ROS              Physical Exam    Airway    Mallampati score: II  TM Distance: >3 FB  Neck ROM: full     Dental   upper dentures,     Cardiovascular  Cardiovascular exam normal    Pulmonary  Pulmonary exam normal     Other Findings   lower teeth and in good repair      Anesthesia Plan  ASA Score- 3     Anesthesia Type- general with ASA Monitors  Additional Monitors:   Airway Plan: ETT  Plan Factors-Patient not instructed to abstain from smoking on day of procedure  Patient did not smoke on day of surgery  Induction- intravenous  Postoperative Plan- Plan for postoperative opioid use  Informed Consent- Anesthetic plan and risks discussed with patient  I personally reviewed this patient with the CRNA  Discussed and agreed on the Anesthesia Plan with the OCTAVIO Turner

## 2018-12-20 ENCOUNTER — ANESTHESIA (OUTPATIENT)
Dept: PERIOP | Facility: HOSPITAL | Age: 63
End: 2018-12-20
Payer: COMMERCIAL

## 2018-12-20 ENCOUNTER — HOSPITAL ENCOUNTER (OUTPATIENT)
Facility: HOSPITAL | Age: 63
Setting detail: OUTPATIENT SURGERY
Discharge: HOME | End: 2018-12-20
Attending: SURGERY | Admitting: SURGERY
Payer: COMMERCIAL

## 2018-12-20 ENCOUNTER — HOSPITAL ENCOUNTER (OUTPATIENT)
Dept: RADIOLOGY | Facility: HOSPITAL | Age: 63
Discharge: HOME | End: 2018-12-20
Attending: SURGERY
Payer: COMMERCIAL

## 2018-12-20 VITALS
HEIGHT: 66 IN | SYSTOLIC BLOOD PRESSURE: 118 MMHG | OXYGEN SATURATION: 98 % | TEMPERATURE: 96.7 F | WEIGHT: 267 LBS | RESPIRATION RATE: 20 BRPM | BODY MASS INDEX: 42.91 KG/M2 | HEART RATE: 70 BPM | DIASTOLIC BLOOD PRESSURE: 59 MMHG

## 2018-12-20 DIAGNOSIS — C50.911 DUCTAL CARCINOMA OF RIGHT BREAST (HCC): ICD-10-CM

## 2018-12-20 DIAGNOSIS — Z01.818 PREOP TESTING: ICD-10-CM

## 2018-12-20 DIAGNOSIS — C50.911 MALIGNANT NEOPLASM OF RIGHT FEMALE BREAST (HCC): ICD-10-CM

## 2018-12-20 PROCEDURE — 88331 PATH CONSLTJ SURG 1 BLK 1SPC: CPT | Performed by: PATHOLOGY

## 2018-12-20 PROCEDURE — 88307 TISSUE EXAM BY PATHOLOGIST: CPT | Performed by: PATHOLOGY

## 2018-12-20 PROCEDURE — A9541 TC99M SULFUR COLLOID: HCPCS

## 2018-12-20 PROCEDURE — 38792 RA TRACER ID OF SENTINL NODE: CPT

## 2018-12-20 RX ORDER — KETAMINE HYDROCHLORIDE 50 MG/ML
INJECTION, SOLUTION, CONCENTRATE INTRAMUSCULAR; INTRAVENOUS AS NEEDED
Status: DISCONTINUED | OUTPATIENT
Start: 2018-12-20 | End: 2018-12-20 | Stop reason: SURG

## 2018-12-20 RX ORDER — DEXAMETHASONE SODIUM PHOSPHATE 4 MG/ML
4 INJECTION, SOLUTION INTRA-ARTICULAR; INTRALESIONAL; INTRAMUSCULAR; INTRAVENOUS; SOFT TISSUE ONCE AS NEEDED
Status: DISCONTINUED | OUTPATIENT
Start: 2018-12-20 | End: 2018-12-20 | Stop reason: HOSPADM

## 2018-12-20 RX ORDER — BUPIVACAINE HYDROCHLORIDE 5 MG/ML
INJECTION, SOLUTION PERINEURAL AS NEEDED
Status: DISCONTINUED | OUTPATIENT
Start: 2018-12-20 | End: 2018-12-20 | Stop reason: HOSPADM

## 2018-12-20 RX ORDER — ACETAMINOPHEN AND CODEINE PHOSPHATE 300; 30 MG/1; MG/1
1 TABLET ORAL EVERY 6 HOURS PRN
Qty: 20 TABLET | Refills: 0 | Status: SHIPPED | OUTPATIENT
Start: 2018-12-20 | End: 2018-12-25

## 2018-12-20 RX ORDER — ONDANSETRON 2 MG/ML
INJECTION INTRAMUSCULAR; INTRAVENOUS AS NEEDED
Status: DISCONTINUED | OUTPATIENT
Start: 2018-12-20 | End: 2018-12-20 | Stop reason: SURG

## 2018-12-20 RX ORDER — SODIUM CHLORIDE, SODIUM LACTATE, POTASSIUM CHLORIDE, CALCIUM CHLORIDE 600; 310; 30; 20 MG/100ML; MG/100ML; MG/100ML; MG/100ML
INJECTION, SOLUTION INTRAVENOUS CONTINUOUS PRN
Status: DISCONTINUED | OUTPATIENT
Start: 2018-12-20 | End: 2018-12-20 | Stop reason: SURG

## 2018-12-20 RX ORDER — CEFAZOLIN SODIUM 2 G/50ML
2000 SOLUTION INTRAVENOUS ONCE
Status: DISCONTINUED | OUTPATIENT
Start: 2018-12-20 | End: 2018-12-20 | Stop reason: HOSPADM

## 2018-12-20 RX ORDER — MIDAZOLAM HYDROCHLORIDE 1 MG/ML
INJECTION INTRAMUSCULAR; INTRAVENOUS AS NEEDED
Status: DISCONTINUED | OUTPATIENT
Start: 2018-12-20 | End: 2018-12-20 | Stop reason: SURG

## 2018-12-20 RX ORDER — FENTANYL CITRATE 50 UG/ML
INJECTION, SOLUTION INTRAMUSCULAR; INTRAVENOUS AS NEEDED
Status: DISCONTINUED | OUTPATIENT
Start: 2018-12-20 | End: 2018-12-20 | Stop reason: SURG

## 2018-12-20 RX ORDER — OXYCODONE HYDROCHLORIDE AND ACETAMINOPHEN 5; 325 MG/1; MG/1
1 TABLET ORAL EVERY 4 HOURS PRN
Status: DISCONTINUED | OUTPATIENT
Start: 2018-12-20 | End: 2018-12-20 | Stop reason: HOSPADM

## 2018-12-20 RX ORDER — SODIUM CHLORIDE, SODIUM LACTATE, POTASSIUM CHLORIDE, CALCIUM CHLORIDE 600; 310; 30; 20 MG/100ML; MG/100ML; MG/100ML; MG/100ML
75 INJECTION, SOLUTION INTRAVENOUS CONTINUOUS
Status: DISCONTINUED | OUTPATIENT
Start: 2018-12-20 | End: 2018-12-20 | Stop reason: HOSPADM

## 2018-12-20 RX ORDER — KETOROLAC TROMETHAMINE 30 MG/ML
INJECTION, SOLUTION INTRAMUSCULAR; INTRAVENOUS AS NEEDED
Status: DISCONTINUED | OUTPATIENT
Start: 2018-12-20 | End: 2018-12-20 | Stop reason: SURG

## 2018-12-20 RX ORDER — GLYCOPYRROLATE 0.2 MG/ML
INJECTION INTRAMUSCULAR; INTRAVENOUS AS NEEDED
Status: DISCONTINUED | OUTPATIENT
Start: 2018-12-20 | End: 2018-12-20 | Stop reason: SURG

## 2018-12-20 RX ORDER — CEFAZOLIN SODIUM 2 G/50ML
SOLUTION INTRAVENOUS AS NEEDED
Status: DISCONTINUED | OUTPATIENT
Start: 2018-12-20 | End: 2018-12-20 | Stop reason: SURG

## 2018-12-20 RX ORDER — FENTANYL CITRATE/PF 50 MCG/ML
12.5 SYRINGE (ML) INJECTION
Status: DISCONTINUED | OUTPATIENT
Start: 2018-12-20 | End: 2018-12-20 | Stop reason: HOSPADM

## 2018-12-20 RX ORDER — 0.9 % SODIUM CHLORIDE 0.9 %
VIAL (ML) INJECTION AS NEEDED
Status: DISCONTINUED | OUTPATIENT
Start: 2018-12-20 | End: 2018-12-20 | Stop reason: HOSPADM

## 2018-12-20 RX ORDER — NEOSTIGMINE METHYLSULFATE 1 MG/ML
INJECTION INTRAVENOUS AS NEEDED
Status: DISCONTINUED | OUTPATIENT
Start: 2018-12-20 | End: 2018-12-20 | Stop reason: SURG

## 2018-12-20 RX ORDER — FENTANYL CITRATE/PF 50 MCG/ML
25 SYRINGE (ML) INJECTION
Status: DISCONTINUED | OUTPATIENT
Start: 2018-12-20 | End: 2018-12-20 | Stop reason: HOSPADM

## 2018-12-20 RX ORDER — SODIUM CHLORIDE, SODIUM LACTATE, POTASSIUM CHLORIDE, CALCIUM CHLORIDE 600; 310; 30; 20 MG/100ML; MG/100ML; MG/100ML; MG/100ML
75 INJECTION, SOLUTION INTRAVENOUS CONTINUOUS
Status: DISCONTINUED | OUTPATIENT
Start: 2018-12-20 | End: 2018-12-20 | Stop reason: SDUPTHER

## 2018-12-20 RX ORDER — DIPHENHYDRAMINE HYDROCHLORIDE 50 MG/ML
12.5 INJECTION INTRAMUSCULAR; INTRAVENOUS ONCE
Status: DISCONTINUED | OUTPATIENT
Start: 2018-12-20 | End: 2018-12-20 | Stop reason: HOSPADM

## 2018-12-20 RX ORDER — EPHEDRINE SULFATE 50 MG/ML
INJECTION, SOLUTION INTRAVENOUS AS NEEDED
Status: DISCONTINUED | OUTPATIENT
Start: 2018-12-20 | End: 2018-12-20 | Stop reason: SURG

## 2018-12-20 RX ORDER — PROPOFOL 10 MG/ML
INJECTION, EMULSION INTRAVENOUS AS NEEDED
Status: DISCONTINUED | OUTPATIENT
Start: 2018-12-20 | End: 2018-12-20 | Stop reason: SURG

## 2018-12-20 RX ORDER — ROCURONIUM BROMIDE 10 MG/ML
INJECTION, SOLUTION INTRAVENOUS AS NEEDED
Status: DISCONTINUED | OUTPATIENT
Start: 2018-12-20 | End: 2018-12-20 | Stop reason: SURG

## 2018-12-20 RX ADMIN — NEOSTIGMINE METHYLSULFATE 2.5 MG: 1 INJECTION INTRAVENOUS at 10:06

## 2018-12-20 RX ADMIN — KETOROLAC TROMETHAMINE 30 MG: 30 INJECTION, SOLUTION INTRAMUSCULAR; INTRAVENOUS at 10:02

## 2018-12-20 RX ADMIN — FENTANYL CITRATE 50 MCG: 50 INJECTION INTRAMUSCULAR; INTRAVENOUS at 09:20

## 2018-12-20 RX ADMIN — ONDANSETRON HYDROCHLORIDE 4 MG: 2 INJECTION, SOLUTION INTRAMUSCULAR; INTRAVENOUS at 09:56

## 2018-12-20 RX ADMIN — KETAMINE HYDROCHLORIDE 5 MG: 50 INJECTION, SOLUTION INTRAMUSCULAR; INTRAVENOUS at 09:08

## 2018-12-20 RX ADMIN — SODIUM CHLORIDE, SODIUM LACTATE, POTASSIUM CHLORIDE, AND CALCIUM CHLORIDE: .6; .31; .03; .02 INJECTION, SOLUTION INTRAVENOUS at 08:45

## 2018-12-20 RX ADMIN — CEFAZOLIN SODIUM 2000 MG: 2 SOLUTION INTRAVENOUS at 09:09

## 2018-12-20 RX ADMIN — MIDAZOLAM HYDROCHLORIDE 2 MG: 1 INJECTION, SOLUTION INTRAMUSCULAR; INTRAVENOUS at 09:06

## 2018-12-20 RX ADMIN — FENTANYL CITRATE 50 MCG: 50 INJECTION INTRAMUSCULAR; INTRAVENOUS at 09:12

## 2018-12-20 RX ADMIN — DEXAMETHASONE SODIUM PHOSPHATE 8 MG: 10 INJECTION INTRAMUSCULAR; INTRAVENOUS at 09:52

## 2018-12-20 RX ADMIN — EPHEDRINE SULFATE 10 MG: 50 INJECTION INTRAMUSCULAR; INTRAVENOUS; SUBCUTANEOUS at 09:37

## 2018-12-20 RX ADMIN — PROPOFOL 200 MG: 10 INJECTION, EMULSION INTRAVENOUS at 09:11

## 2018-12-20 RX ADMIN — FENTANYL CITRATE 50 MCG: 50 INJECTION INTRAMUSCULAR; INTRAVENOUS at 09:08

## 2018-12-20 RX ADMIN — ROCURONIUM BROMIDE 50 MG: 10 INJECTION INTRAVENOUS at 09:19

## 2018-12-20 RX ADMIN — GLYCOPYRROLATE 0.4 MG: 0.2 INJECTION, SOLUTION INTRAMUSCULAR; INTRAVENOUS at 10:06

## 2018-12-20 RX ADMIN — SODIUM CHLORIDE, POTASSIUM CHLORIDE, SODIUM LACTATE AND CALCIUM CHLORIDE 75 ML/HR: 600; 310; 30; 20 INJECTION, SOLUTION INTRAVENOUS at 08:43

## 2018-12-20 NOTE — DISCHARGE SUMMARY
Discharge Summary - Surgical Oncology   Joann Marquez 59 y o  female MRN: [id number]  Unit/Bed#: [facility] Encounter: [id number]    Admission Date:  12/20/18    Admitting Diagnosis: Malignant neoplasm of right female breast [facility]) [C50 911]    HPI: 60 yo F, admitted for right partial mastectomy with sentinel node biopsy and possible axillary dissection on 12/20/18 with Dr Milagros العراقي  [place] node biopsy was negative and axillary dissection was not completed  She tolerated the procedure well  Procedures Performed: No orders of the defined types were placed in this encounter  Discharge Diagnosis: Breast cancer, right      Condition at Discharge: stable         Discharge instructions/Information to patient and family:   See after visit summary for information provided to patient and family  Provisions for Follow-Up Care:  See after visit summary for information related to follow-up care and any pertinent home health orders  Disposition: Home    Planned Readmission: No    Discharge Statement   I spent 20 minutes discharging the patient  This time was spent on the day of discharge  I had direct contact with the patient on the day of discharge  Additional documentation is required if more than 30 minutes were spent on discharge  Discharge Medications:  See after visit summary for reconciled discharge medications provided to patient and family

## 2018-12-20 NOTE — DISCHARGE INSTRUCTIONS
Breast Cancer   WHAT YOU SHOULD KNOW:   Breast cancer is a cancer that starts in the tissue of the female or male breast  It can cause a lump, deformity of the breast, or discharge from the nipple  Breast cancer cells may spread to other parts of the body, such as the liver, lung, and brain  INSTRUCTIONS:   Medicines:   · Pain medicine: You may be given a prescription medicine to decrease pain  Do not wait until the pain is severe before you take this medicine  · Take your medicine as directed  Call your healthcare provider if you think your medicine is not helping or if you have side effects  Tell him if you are allergic to any medicine  Keep a list of the medicines, vitamins, and herbs you take  Include the amounts, and when and why you take them  Bring the list or the pill bottles to follow-up visits  Carry your medicine list with you in case of an emergency  Follow up with your oncologist as directed: You will need to see your oncologist for ongoing treatment and follow-up  Write down your questions so you remember to ask them during your visits  Drink liquids as directed:  Ask how much liquid to drink each day and which liquids are best for you  Drink extra liquids to avoid dehydration  You will also need to replace fluid if you are vomiting or have diarrhea from cancer treatments  Limit alcohol:  Your oncologist may tell you to limit or not drink alcohol  Alcohol may increase the risk that your breast cancer will come back  Do not smoke: If you smoke, it is never too late to quit  Smoking may increase the risk that your breast cancer will come back  Ask for information if you need help quitting  Contact your oncologist if:   · You have a fever  · You cannot make it to your radiation or chemotherapy visit  · You are vomiting and cannot keep food or liquids down  · You are depressed and you feel that you cannot cope any longer      · You have questions or concerns about your condition or care   Return to the emergency department if:   · Your arm swells up and is painful  · Your arm or leg feels warm, tender, and painful  It may look swollen and red  · You suddenly feel lightheaded or short of breath  Wound Care Instructions:  Leave the dressing and tape on until your follow up with our office  You may sponge bath and wash your hair, but keep the incision site clean and dry  Do not   shower or submerge in water  The above information is an  only  It is not intended as medical advice for individual conditions or treatments  Talk to your doctor, nurse or pharmacist before following any medical regimen to see if it is safe and effective for you      Call our office with any questions  [phone]

## 2018-12-20 NOTE — OP NOTE
OPERATIVE REPORT  PATIENT NAME: Nery Lawson    :  1955  MRN: [id number]  Pt Location:  OR ROOM 12    SURGERY DATE: 2018    Surgeon(s) and Role:     * João Hilliard MD - Primary    Preop Diagnosis:  Malignant neoplasm of right female breast ([facility] [address] ) [C50 911]    Post-Op Diagnosis Codes:     * Malignant neoplasm of right female breast ([address] ) [C50 911]    Right ultrasound localized partial mastectomy with sentinel node mapping and deep biopsy    Specimen(s):  ID Type Source Tests Collected by Time Destination   1 : #1 Tissue Lymph Node, [place] TISSUE EXAM João Hilliard MD 2018 0935    2 : [place] Node #2 Tissue Lymph Node, [place] TISSUE EXAM João Hilliard MD 2018 0937    3 : Right partial mastectomy Tissue Breast, Right TISSUE EXAM João Hilliard MD 2018 0947    4 : Additional Posterior Margins Tissue Breast, Right TISSUE EXAM João Hilliard MD 2018 3720        Estimated Blood Loss:   Minimal    Drains:none        Anesthesia Type:   General    Operative Indications:  Malignant neoplasm of right female breast ([facility] [address] ) [C50 911]      Operative Findings:  none    Complications:   None    Procedure and Technique:  After identifying the patient in the preop holding area and marking the right side she was brought to the operating room and placed supine on the OR table  After the administration of anesthesia a time out was performed  A combination of methylene blues and saline was injected into the right breast  Ultrasound was used to identify the preoperatively placed marker in the right breast at 11:00 8cmfn and incision was planned  Using a 15 blade scalpel an incision was made bovie electrocautery was used to deepen the incision  Skin flaps were made in all directions and the mass was excised  Ultrasound was used to conform the lesion and ,arker in the specimen   It was marked short superior and long lateral  An additional posterior margin was taken (down to the muscle) and marked stitch marks new margin  I then turned to the axilla where a similar incision was made  2 sentinel nodes were identified and removed, pathology confirmed they were negative for malignancy  The wounds were irrigated and hemostasis achieved  They were closed with interrupted 3-0 vicryl sutures, interrupted 4-0 vicryl deep dermal sutures and running 4-0 monocryls  Dressing were placed and counts were correct  I was present and scrubbed for the entire procedure     I was present for the entire procedure    Patient Disposition:  PACU     SIGNATURE: Lydia Benitez MD  DATE: December 20, 2018  TIME: 10:29 AM

## 2018-12-20 NOTE — ANESTHESIA POSTPROCEDURE EVALUATION
Post-Op Assessment Note      CV Status:  Stable    Mental Status:  Alert and awake    Hydration Status:  Euvolemic    PONV Controlled:  Controlled    Airway Patency:  Patent  Airway: intubated    Post Op Vitals Reviewed: Yes          Staff: Anesthesiologist           /70 (12/20/18 1020)    Temp 98 °F (36 7 °C) (12/20/18 1020)    Pulse 75 (12/20/18 1020)   Resp 16 (12/20/18 1020)    SpO2 100 % (12/20/18 1020)

## 2018-12-27 ENCOUNTER — TELEPHONE (OUTPATIENT)
Dept: HEMATOLOGY ONCOLOGY | Facility: CLINIC | Age: 63
End: 2018-12-27

## 2019-01-04 ENCOUNTER — CONSULT (OUTPATIENT)
Dept: HEMATOLOGY ONCOLOGY | Facility: CLINIC | Age: 64
End: 2019-01-04
Payer: COMMERCIAL

## 2019-01-04 VITALS
RESPIRATION RATE: 18 BRPM | HEART RATE: 86 BPM | BODY MASS INDEX: 43.07 KG/M2 | WEIGHT: 268 LBS | TEMPERATURE: 97 F | OXYGEN SATURATION: 98 % | HEIGHT: 66 IN | DIASTOLIC BLOOD PRESSURE: 82 MMHG | SYSTOLIC BLOOD PRESSURE: 152 MMHG

## 2019-01-04 DIAGNOSIS — Z17.0 MALIGNANT NEOPLASM OF UPPER-OUTER QUADRANT OF RIGHT BREAST IN FEMALE, ESTROGEN RECEPTOR POSITIVE (HCC): Primary | ICD-10-CM

## 2019-01-04 DIAGNOSIS — C50.411 MALIGNANT NEOPLASM OF UPPER-OUTER QUADRANT OF RIGHT BREAST IN FEMALE, ESTROGEN RECEPTOR POSITIVE (HCC): Primary | ICD-10-CM

## 2019-01-04 PROCEDURE — 99244 OFF/OP CNSLTJ NEW/EST MOD 40: CPT | Performed by: INTERNAL MEDICINE

## 2019-01-04 NOTE — PROGRESS NOTES
Hematology/Oncology Outpatient Consult  Felipe Love 59 y o  female 1955 [id number]    Date:  1/4/2019        Assessment and Plan:  1  Malignant neoplasm of upper-outer quadrant of right breast in female, estrogen receptor positive ([facility] [address] )  I had a lengthy discussion with the patient regarding breast cancer in general and about her relatively early stage in particular  The patient was told that we will wait for the Oncotype DX recurrence score study to make a decision of adjuvant chemotherapy would be of any benefit  She was told that if her scores are less than 26 then adjuvant chemotherapy will not be recommended  If her stool recurrence score would be equal or above 31 then adjuvant chemotherapy will be offered  If her score somewhere between 26-30 then adjuvant chemotherapy will be discussed  The patient also was educated about adjuvant endocrine therapy which will definitely be used since her tumor was strongly ER positive, MA positive   I discussed with her aromatase inhibitors in general and gave her material to read about anastrozole as endocrine therapy  She was told that she will be started on the anastrozole as soon as we complete the initial evaluation and have the results of the Oncotype DX recurrence score  We will meet with the patient again in about 3 weeks from now after she gets seen by the Radiation Oncology team for adjuvant radiation evaluation to finalize our treatment plan  - CBC and differential; Future  - Comprehensive metabolic panel; Future  - Vitamin D 25 hydroxy; Future        HPI:  This is a 67-year-old female with history of hypertension and obesity  Her 1st menses was at age 16 and the 1st 5 children at age 25  The patient has no family history of breast cancer or ovarian cancer  She had her 1st screening bilateral mammogram in June 2018 which showed a right-sided asymmetry    A right-sided diagnostic mammogram was done on the 15th November 2018 which showed spiculated mass  An ultrasound revealed a solid mass measuring 1 6 cm in greatest dimension at 11 o'clock 8 cm from the nipple  She then had a right ultrasound-guided core biopsy on the 23rd of November which showed intermediate grade ER positive 100% , WI positive 95%, her 2 Cesilia negative by IHC invasive ductal carcinoma  She then had her definitive surgery on the 20th December 2018 where she underwent a right-sided partial mastectomy with sentinel lymph node biopsy  The pathology showed 2-sentinel lymph nodes for malignancy  Her tumor was grade 2 infiltrating ductal carcinoma with the size between 2 cm to 2 5 cm in greatest dimension  She did have also focus of DCIS with an approximately 1 mm of the inked medial margin  No lymphovascular invasion was reported, all the margins were clear  Her final pathology was pT2 pN0 M 0 G2 or stage IIA  The Oncotype recurrence score study is still pending  The patient will be seen by the Radiation Oncology team to discuss adjuvant radiation treatment to the right breast next week  The patient tolerated the surgery very well and she came in today to discuss further treatment options adjuvantly  Interval history:    ROS: Review of Systems   Constitutional: Negative for activity change, appetite change, chills, diaphoresis, fatigue, fever and unexpected weight change  HENT: Negative for congestion, dental problem, ear discharge, ear pain, facial swelling, hearing loss, mouth sores, nosebleeds, postnasal drip, sore throat, tinnitus and trouble swallowing  Eyes: Negative for discharge, redness, itching and visual disturbance  Respiratory: Negative for cough, chest tightness, shortness of breath and wheezing  Cardiovascular: Negative for chest pain, palpitations and leg swelling  Gastrointestinal: Negative for abdominal distention, abdominal pain, anal bleeding, blood in stool, constipation, diarrhea, nausea and vomiting     Genitourinary: Negative for difficulty urinating, dysuria, flank pain, frequency, hematuria and urgency  Musculoskeletal: Negative for arthralgias, back pain, gait problem, joint swelling, myalgias and neck pain  Skin: Negative for color change, pallor, rash and wound  Neurological: Negative for dizziness, syncope, speech difficulty, weakness, light-headedness, numbness and headaches  Hematological: Negative for adenopathy  Does not bruise/bleed easily  Psychiatric/Behavioral: Negative for agitation, behavioral problems, confusion and sleep disturbance         Past Medical History:   Diagnosis Date    Anemia     Arthritis     Cancer ([facility] )     right breast    GERD (gastroesophageal reflux disease)     Hyperlipidemia     Hypertension     Stroke ([facility] )     TIA        Past Surgical History:   Procedure Laterality Date    BREAST SURGERY      UT MASTECTOMY,PARTIAL,  WITH AXILLARY LYMPHADENECTOMY Right 12/20/2018    Procedure: ULTRASOUND LOCALIZED PARTIAL MASTECTOMY W/SENTINEL NODE BIOPSY POSS AXILLARY DISSECTION;  Surgeon: Loi Bowles MD;  Location: [address] [place] [address];  Service: General    US GUIDED BREAST BIOPSY RIGHT COMPLETE Right 11/23/2018       Social History     Social History    Marital status: Single     Spouse name: N/A    Number of children: N/A    Years of education: N/A     Social History Main Topics    Smoking status: Never Smoker    Smokeless tobacco: Never Used      Comment: NO TOBACCO USE    Alcohol use No    Drug use: No    Sexual activity: Not Asked     Other Topics Concern    None     Social History Narrative    None       Family History   Problem Relation Age of Onset    Colon cancer Mother 62    Hypertension Mother     Cancer Father     Pancreatic cancer Father 61    Hypertension Son     Hypertension Daughter        Allergies   Allergen Reactions    No Active Allergies          Current Outpatient Prescriptions:     atorvastatin (LIPITOR) 80 mg tablet, Take 1 tablet (80 mg total) by mouth every 24 hours, Disp: 30 tablet, Rfl: 2    fluticasone (FLONASE) 50 mcg/act nasal spray, 2 sprays into each nostril daily, Disp: 16 g, Rfl: 2    folic acid (FOLVITE) 1 mg tablet, Take 1 tablet (1 mg total) by mouth every 24 hours, Disp: 30 tablet, Rfl: 5    losartan (COZAAR) 50 mg tablet, Take 1 tablet (50 mg total) by mouth daily, Disp: 30 tablet, Rfl: 2      Physical Exam:  /82 (BP Location: Right arm, Patient Position: Sitting, Cuff Size: Large)   Pulse 86   Temp (!) 97 °F (36 1 °C) (Tympanic)   Resp 18   Ht 5' 6" (1 676 m)   Wt 122 kg (268 lb)   SpO2 98%   BMI 43 26 kg/m²     Physical Exam   Constitutional: She is oriented to person, place, and time  She appears well-developed and well-nourished  No distress  HENT:   Head: Normocephalic and atraumatic  Nose: Nose normal    Mouth/Throat: Oropharynx is clear and moist    Eyes: Pupils are equal, round, and reactive to light  Conjunctivae and EOM are normal  Right eye exhibits no discharge  Left eye exhibits no discharge  No scleral icterus  Neck: Normal range of motion  Neck supple  No JVD present  No tracheal deviation present  No thyromegaly present  Cardiovascular: Normal rate, regular rhythm and normal heart sounds  Exam reveals no friction rub  No murmur heard  Pulmonary/Chest: Effort normal and breath sounds normal  No stridor  No respiratory distress  She has no wheezes  She has no rales  She exhibits no tenderness  The scar of the right upper outer breast wound is well-healed  The the scar of the right axilla is also well-healed   Abdominal: Soft  Bowel sounds are normal  She exhibits no distension and no mass  There is no hepatosplenomegaly, splenomegaly or hepatomegaly  There is no tenderness  There is no rebound and no guarding  Musculoskeletal: Normal range of motion  She exhibits no edema, tenderness or deformity  Lymphadenopathy:     She has no cervical adenopathy     Neurological: She is alert and oriented to person, place, and time  She has normal reflexes  No cranial nerve deficit  Coordination normal    Skin: Skin is warm and dry  No rash noted  She is not diaphoretic  No erythema  No pallor  Psychiatric: She has a normal mood and affect  Her behavior is normal  Judgment and thought content normal          Labs:  Lab Results   Component Value Date    WBC 9 80 12/11/2018    HGB 12 4 12/11/2018    HCT 38 2 12/11/2018    MCV 87 12/11/2018     12/11/2018     Lab Results   Component Value Date    K 4 4 12/11/2018     12/11/2018    CO2 25 12/11/2018    BUN 16 12/11/2018    CREATININE 0 58 (L) 12/11/2018    CALCIUM 9 2 12/11/2018    AST 20 12/11/2018    ALT 27 12/11/2018    ALKPHOS 102 12/11/2018    EGFR 114 12/11/2018     No results found for: TSH    Patient voiced understanding and agreement in the above discussion  Aware to contact our office with questions/symptoms in the interim

## 2019-01-11 ENCOUNTER — CLINICAL SUPPORT (OUTPATIENT)
Dept: RADIATION ONCOLOGY | Facility: CLINIC | Age: 64
End: 2019-01-11

## 2019-01-11 ENCOUNTER — RADIATION ONCOLOGY CONSULT (OUTPATIENT)
Dept: RADIATION ONCOLOGY | Facility: CLINIC | Age: 64
End: 2019-01-11
Attending: RADIOLOGY
Payer: COMMERCIAL

## 2019-01-11 VITALS
HEART RATE: 81 BPM | RESPIRATION RATE: 18 BRPM | TEMPERATURE: 98.3 F | SYSTOLIC BLOOD PRESSURE: 146 MMHG | DIASTOLIC BLOOD PRESSURE: 82 MMHG | WEIGHT: 270.8 LBS | BODY MASS INDEX: 43.52 KG/M2 | HEIGHT: 66 IN | OXYGEN SATURATION: 97 %

## 2019-01-11 DIAGNOSIS — Z17.0 MALIGNANT NEOPLASM OF UPPER-OUTER QUADRANT OF RIGHT BREAST IN FEMALE, ESTROGEN RECEPTOR POSITIVE (HCC): Primary | ICD-10-CM

## 2019-01-11 DIAGNOSIS — C50.411 MALIGNANT NEOPLASM OF UPPER-OUTER QUADRANT OF RIGHT BREAST IN FEMALE, ESTROGEN RECEPTOR POSITIVE (HCC): Primary | ICD-10-CM

## 2019-01-11 PROCEDURE — 99215 OFFICE O/P EST HI 40 MIN: CPT | Performed by: RADIOLOGY

## 2019-01-11 NOTE — PROGRESS NOTES
Felipe Man  1955  Ms Vernon Mejía is a 59 y o  female    Chief Complaint   Patient presents with    Consult     radiation oncology       Cancer Staging  No matching staging information was found for the patient  59 y o  Female with recently diagnosed right breast cancer  She has been referred by Dr Parmjit Perez  No family history of breast cancer  Family history of colorectal cancer in mother at age 62 and pancreatic cancer in father at age 61  Patient presented with abnormal screening mammogram, done in June 2018  This was her first mammogram  She denies breast pain, or changes with skin/nipple  6/15/18   Digital Bilateral Screening Mammogram:  R UOQ focal asymmetry 8 cm FN    11/15/18   Mammo diagnostic right w cad: There is a 14 mm x 14 mm x 16 mm high density, irregularly shaped mass with spiculated margins seen in the upper outer quadrant of the right breast in the middle depth, 8 cm from the nipple  US breast right limited (diagnostic): There is a 15 mm x 12 mm x 16 mm irregularly shaped, hypoechoic mass with spiculated margins with shadowing seen in the upper outer quadrant of the right breast at 11 o'clock in the middle depth, 8 cm from the nipple  There are no suspicious findings in the right axilla    BI-RADS 4      11/23/18   US guided right breast biopsy:  IDC, grade 2, ER/OK +, HER2 -    12/7/18 Dr Silvano Velasco consult    12/20/18   Right partial mastectomy and SLNB      1/2/19 Dr Parmjit Perez post op visit  - incisions healing well  - Oncotype DX ordered  - refer to radiation oncology for whole breast RT and medical oncology      1/4/19 consult with Dr Brittany Boswell   - oncotype DX still pending  - will follow up in 3 weeks to finalize treatment plan      Appointments:  1/25/19 Dr Brittany Boswell  2/2019 Dr Parmjit Perez       Malignant neoplasm of right female breast ([facility] [address] )    11/23/2018 Initial Diagnosis     Malignant neoplasm of right female breast ([facility] [address] )         11/23/2018 Biopsy     Rt Breast US BX 1100 8cmfn, 4 passes 12g Shiraz:  - Invasive breast carcinoma of no special type (ductal NST/invasive ductal carcinoma)  - grade 2  - %  - AL 95%  - HER2 negative         2018 Surgery     Right partial mastectomy and SLN biopsy:  Infiltrating ductal carcinoma, Grade 2/3, felt to be between 2 0 cm and 2 5 cm in greatest dimension  - No invasive tumor is seen at inked margins, but there is a focus of DCIS seen within approximately 1mm of the inked medial margin  - no LVI  - no LCIS  - 0/2 LN's  at least Stage IIA - pT2, pN0, cM0, G2     - Dr Tam Mcguire         2018 -  Cancer Staged     Stage IIA - pT2, pN0, cM0, G2            2019 Genomic Testing     Oncotype DX score: pending            Clinical Trial: no    Screening  Tobacco  Current tobacco user: no  If yes, brief counseling provided: NA    Hypertension  Hypertension screening performed: yes  Normotensive:  no  If no, referred to PCP: BP followed by PCP, next appt schedule for 19    Depression Screening  Screened for depression using PHQ-2: no    Screened for depression using PHQ-9:  no  Screening positive or negative:  negative  If score >4, was any of the following actions taken? Additional evaluation for depression, suicide risk assesment, referral to PCP or psychiatry, medication started:  n/a    Advanced Care Planning for Patients >65 years  Advanced Care Planning Discussed:  n/a  Patient named surrogate decision maker or care plan in chart: n/a    OB/GYN History:  The patient underwent menarche at 16 years  Menopause Status Pre, Clarita, Post, Unknown and No Answer  No LMP recorded  Patient is postmenopausal   Menopause at ~ 59 years  Menopause Reason -normal  Hormone replacement therapy: no      5   Para 5   Age at first delivery being 22 years  Nursing: no    Birth control pills: yes    Years used - unknown  Gyncological comment   [unfilled]      Health Maintenance   Topic Date Due    Hepatitis C Screening 1955    CRC Screening: Colonoscopy  1955    Pneumococcal PPSV23 Highest Risk Adult (1 of 3 - PCV13) 02/13/1975    Depression Screening PHQ  08/09/2019    MAMMOGRAM  11/15/2019    DTaP,Tdap,and Td Vaccines (2 - Td) 04/25/2028    INFLUENZA VACCINE  Completed       Patient Active Problem List   Diagnosis    Benign essential hypertension    Vasomotor rhinitis    Mixed hyperlipidemia    Pre-op evaluation    Malignant neoplasm of right female breast [facility])     Past Medical History:   Diagnosis Date    Anemia     Arthritis     [facility] cancer ([facility] [address] )     Cancer ([facility] [address] )     right breast    GERD (gastroesophageal reflux disease)     Hyperlipidemia     Hypertension     [facility] )     TIA      Past Surgical History:   Procedure Laterality Date    BREAST SURGERY      FL MASTECTOMY,PARTIAL,  WITH AXILLARY LYMPHADENECTOMY Right 12/20/2018    Procedure: ULTRASOUND LOCALIZED PARTIAL MASTECTOMY W/SENTINEL NODE BIOPSY POSS AXILLARY DISSECTION;  Surgeon: Isabel Alonso MD;  Location: [facility] MAIN OR;  Service: General    TUBAL LIGATION      US GUIDED BREAST BIOPSY RIGHT COMPLETE Right 11/23/2018     Family History   Problem Relation Age of Onset    Colon cancer Mother 62    Hypertension Mother     Cancer Father     Pancreatic cancer Father 61    Hypertension Son     Hypertension Daughter      Social History     Social History    Marital status: Single     Spouse name: N/A    Number of children: N/A    Years of education: N/A     Occupational History    Not on file       Social History Main Topics    Smoking status: Never Smoker    Smokeless tobacco: Never Used      Comment: NO TOBACCO USE    Alcohol use No    Drug use: No    Sexual activity: Not on file     Other Topics Concern    Not on file     Social History Narrative    No narrative on file       Current Outpatient Prescriptions:     atorvastatin (LIPITOR) 80 mg tablet, Take 1 tablet (80 mg total) by mouth every 24 hours, Disp: 30 tablet, Rfl: 2    fluticasone (FLONASE) 50 mcg/act nasal spray, 2 sprays into each nostril daily, Disp: 16 g, Rfl: 2    folic acid (FOLVITE) 1 mg tablet, Take 1 tablet (1 mg total) by mouth every 24 hours, Disp: 30 tablet, Rfl: 5    losartan (COZAAR) 50 mg tablet, Take 1 tablet (50 mg total) by mouth daily, Disp: 30 tablet, Rfl: 2    Allergies   Allergen Reactions    No Active Allergies        Review of Systems:  Review of Systems   Constitutional: Negative  HENT: Negative  Eyes: Negative  Respiratory: Negative  Cardiovascular: Negative  Gastrointestinal: Negative  Endocrine: Negative  Genitourinary: Negative  Musculoskeletal: Positive for arthralgias (fingers)  Skin: Negative  Allergic/Immunologic: Positive for environmental allergies  Neurological: Negative  Hematological: Negative  Psychiatric/Behavioral: Negative  Vitals:    01/11/19 0958   BP: 146/82   Pulse: 81   Resp: 18   Temp: 98 3 °F (36 8 °C)   TempSrc: Tympanic   SpO2: 97%   Weight: 123 kg (270 lb 12 8 oz)   Height: 5' 6" (1 676 m)            Imaging:Nm Sentinal Node Inj    Result Date: 12/20/2018  Narrative: SENTINEL NODE LYMPHOSCINTIGRAPHY INDICATION:  C50 911: Malignant neoplasm of unspecified site of right female breast  FINDINGS: 0 508 mCi Tc-99m sulfur colloid (0 6 cc volume) was administered intradermally into the right breast by myself in the special procedures unit  No imaging was performed  Impression: Injection of  0 508 mCi Tc-99m sulfur colloid into the right breast in the special procedures unit  The patient was transferred to the OR without further imaging   Workstation performed: [id number]       Teaching:  NCI RT packet with RT to breast education given

## 2019-01-11 NOTE — PROGRESS NOTES
Consultation - Radiation Oncology     QXR:[id number] : 1955  Encounter: [id number]  Patient Information: [place]      CHIEF COMPLAINT  Chief Complaint   Patient presents with    Consult     radiation oncology     Cancer Staging  Malignant neoplasm of right female breast [facility])  Staging form: Breast, AJCC 8th Edition  - Clinical: No stage assigned - Unsigned  - Pathologic: Stage IA (pT2, pN0, cM0, G2, ER: Positive, AZ: Positive, HER2: Negative) - Signed by Blue Meyer MD on 2019           History of Present Illness   Amaris Mejía is a 59y o  year old female who  recently diagnosed right breast cancer  She has been referred by Dr Parmjit Perez       Patient presented with abnormal screening mammogram, done in 2018  This was her first mammogram  She denies breast pain, or changes with skin/nipple       6/15/18   Digital Bilateral Screening Mammogram:  R UOQ focal asymmetry      11/15/18   Mammo diagnostic right w cad: There is a 14 mm x 14 mm x 16 mm high density, irregularly shaped mass with spiculated margins seen in the upper outer quadrant of the right breast in the middle depth, 8 cm from the nipple  US breast right limited (diagnostic): There is a 15 mm x 12 mm x 16 mm irregularly shaped, hypoechoic mass with spiculated margins with shadowing seen in the upper outer quadrant of the right breast at 11 o'clock in the middle depth, 8 cm from the nipple  There are no suspicious findings in the right axilla  BI-RADS 4        18   US guided right breast biopsy:  IDC, grade 2, ER/AZ +, HER2 -     18 Dr Silvano Velasco consult     18   Right partial mastectomy and SLNB     Pathology was consistent with invasive ductal carcinoma measuring between 2 -2 5 cm  There is no invasive tumor at the margin but a focus of DCIS were which was close within 1 mm of the medial margin  Two sentinel lymph nodes were negative for metastasis    The tumor was grade 2    Her postoperative course has been uneventful     1/2/19 Dr Humberto Cordova post op visit  - incisions healing well  - Oncotype DX ordered  - refer to radiation oncology for whole breast RT and medical oncology   She has seen Dr Daley who will offer systemic therapy depending on her Oncotype DX score  No family history of breast cancer  Family history of colorectal cancer in mother at age 62 and pancreatic cancer in father at age 61    No history of collagen vascular disease      Historical Information      Malignant neoplasm of right female breast ([place] [address] )    11/23/2018 Initial Diagnosis     Malignant neoplasm of right female breast ([place] [address] )         11/23/2018 Biopsy     Rt Breast US BX 1100 8cmfn, 4 passes 12g Marquee:  - Invasive breast carcinoma of no special type (ductal NST/invasive ductal carcinoma)    - grade 2  - %  - NC 95%  - HER2 negative         12/20/2018 Surgery     Right partial mastectomy and SLN biopsy:  Infiltrating ductal carcinoma, Grade 2/3, felt to be between 2 0 cm and 2 5 cm in greatest dimension  - No invasive tumor is seen at inked margins, but there is a focus of DCIS seen within approximately 1mm of the inked medial margin  - no LVI  - no LCIS  - 0/2 LN's  at least Stage IIA - pT2, pN0, cM0, G2     - Dr Humberto Cordova         12/20/2018 -  Cancer Staged     Stage IIA - pT2, pN0, cM0, G2            1/4/2019 Genomic Testing     Oncotype DX score: pending              Past Medical History:   Diagnosis Date    Anemia     Arthritis     Breast cancer ([place] [address] )     Cancer ([place] [address] )     right breast    GERD (gastroesophageal reflux disease)     Hyperlipidemia     Hypertension     Stroke ([place] [address] )     TIA      Past Surgical History:   Procedure Laterality Date    BREAST SURGERY      NC MASTECTOMY,PARTIAL,  WITH AXILLARY LYMPHADENECTOMY Right 12/20/2018    Procedure: ULTRASOUND LOCALIZED PARTIAL MASTECTOMY W/SENTINEL NODE BIOPSY POSS AXILLARY DISSECTION;  Surgeon: João Hilliard MD;  Location:  MAIN OR;  Service: General    TUBAL LIGATION      US GUIDED BREAST BIOPSY RIGHT COMPLETE Right 11/23/2018       Family History   Problem Relation Age of Onset    Colon cancer Mother 62    Hypertension Mother     Cancer Father     Pancreatic cancer Father 61    Hypertension Son     Hypertension Daughter        Social History   History   Alcohol Use No     History   Drug Use No     History   Smoking Status    Never Smoker   Smokeless Tobacco    Never Used     Comment: NO TOBACCO USE         Meds/Allergies     Current Outpatient Prescriptions:     atorvastatin (LIPITOR) 80 mg tablet, Take 1 tablet (80 mg total) by mouth every 24 hours, Disp: 30 tablet, Rfl: 2    fluticasone (FLONASE) 50 mcg/act nasal spray, 2 sprays into each nostril daily, Disp: 16 g, Rfl: 2    folic acid (FOLVITE) 1 mg tablet, Take 1 tablet (1 mg total) by mouth every 24 hours, Disp: 30 tablet, Rfl: 5    losartan (COZAAR) 50 mg tablet, Take 1 tablet (50 mg total) by mouth daily, Disp: 30 tablet, Rfl: 2  Allergies   Allergen Reactions    No Active Allergies          Review of Systems     Constitutional: Negative  HENT: Negative  Eyes: Negative  Respiratory: Negative  Cardiovascular: Negative  Gastrointestinal: Negative  Endocrine: Negative  Genitourinary: Negative  Musculoskeletal: Positive for arthralgias (fingers)  Skin: Negative  Allergic/Immunologic: Positive for environmental allergies  Neurological: Negative  Hematological: Negative  Psychiatric/Behavioral: Negative          Screening  Tobacco  Current tobacco user: no  If yes, brief counseling provided: NA     Hypertension  Hypertension screening performed: yes  Normotensive:  no  If no, referred to PCP: BP followed by PCP, next appt schedule for 2/12/19     Depression Screening  Screened for depression using PHQ-2: no     Screened for depression using PHQ-9:  no  Screening positive or negative:  negative  If score >4, was any of the following actions taken? Additional evaluation for depression, suicide risk assesment, referral to PCP or psychiatry, medication started:  n/a     Advanced Care Planning for Patients >65 years  Advanced Care Planning Discussed:  n/a  Patient named surrogate decision maker or care plan in chart: n/a     OB/GYN History:  The patient underwent menarche at 16 years  Menopause Status Pre, Clarita, Post, Unknown and No Answer  No LMP recorded  Patient is postmenopausal   Menopause at ~ 59 years  Menopause Reason -normal  Hormone replacement therapy: no      5   Para 5   Age at first delivery being 22 years  Nursing: no    Birth control pills: yes  Years used - unknown  Gyncological comment     OBJECTIVE:   /82   Pulse 81   Temp 98 3 °F (36 8 °C) (Tympanic)   Resp 18   Ht 5' 6" (1 676 m)   Wt 123 kg (270 lb 12 8 oz)   SpO2 97%   BMI 43 71 kg/m²   Pain Assessment:  0  Performance Status: ECOG/Zubrod/WHO: 0 - Asymptomatic    Physical Exam   Constitutional: She appears well-developed  HENT:   Head: Normocephalic  Hair is normal    Mouth/Throat: Oropharynx is clear and moist    Eyes: EOM are normal  No scleral icterus  Neck: Neck supple  No spinous process tenderness present  No edema present  Cardiovascular: Normal rate and regular rhythm  Pulmonary/Chest: Effort normal and breath sounds normal  No respiratory distress  She has no wheezes  She exhibits no tenderness  She has a small seroma at the primary site  Her incision is healing well without any sign of erythema or drainage  No suspicious lesions palpable in either breast   She has good range of motion of the right upper extremity without evidence of lymphedema  Abdominal: Soft  Bowel sounds are normal  She exhibits no distension, no ascites and no mass  There is no hepatosplenomegaly  There is no tenderness  There is no rebound  Genitourinary: No breast swelling or tenderness  Musculoskeletal: Normal range of motion  She exhibits no edema or tenderness  Lymphadenopathy:     She has no cervical adenopathy  She has no axillary adenopathy  Neurological: She is alert  She has normal strength  No cranial nerve deficit  Coordination and gait normal    Skin: Skin is intact  Psychiatric: She has a normal mood and affect  Her speech is normal and behavior is normal          RESULTS  Lab Results    Chemistry        Component Value Date/Time    K 4 4 12/11/2018 1201     12/11/2018 1201    CO2 25 12/11/2018 1201    BUN 16 12/11/2018 1201    CREATININE 0 58 (L) 12/11/2018 1201        Component Value Date/Time    CALCIUM 9 2 12/11/2018 1201    ALKPHOS 102 12/11/2018 1201    AST 20 12/11/2018 1201    ALT 27 12/11/2018 1201            Lab Results   Component Value Date    WBC 9 80 12/11/2018    HGB 12 4 12/11/2018    HCT 38 2 12/11/2018    MCV 87 12/11/2018     12/11/2018         Imaging Studies  Nm Sentinal Node Inj    Result Date: 12/20/2018  Narrative: SENTINEL NODE LYMPHOSCINTIGRAPHY INDICATION:  C50 911: Malignant neoplasm of unspecified site of right female breast  FINDINGS: 0 508 mCi Tc-99m sulfur colloid (0 6 cc volume) was administered intradermally into the right breast by myself in the special procedures unit  No imaging was performed  Impression: Injection of  0 508 mCi Tc-99m sulfur colloid into the right breast in the special procedures unit  The patient was transferred to the OR without further imaging  Workstation performed: [id number]         Pathology:      Right axillary sentinel lymph node 1:  - One lymph node identified with no malignancy seen      B  Right axillary sentinel lymph node 2:  - One lymph node identified with no malignancy seen      C   Right partial mastectomy:  - Infiltrating ductal carcinoma, Grade 2/3, felt to be between 2 0 cm and 2 5 cm in greatest dimension  - No invasive tumor is seen at inked margins, but there is a focus of DCIS seen within approximately 1mm of the inked medial margin  - See synoptic report for further details     D  Right breast posterior margin tissue:  - No tumor seen      INVASIVE CARCINOMA OF THE BREAST TUMOR STAGING SUMMARY (includes specimens A-D of this case and prior biopsy [id number])  1  Specimen Identification     - Procedure: Lumpectomy     - Lymph Node Sampling: Right sentinel lymph nodes     - Specimen Laterality: Right     - Tumor Site (Christina Soni; Position/o'clock): 11 o'clock, 8 cm from nipple   2  Invasive Tumor:     - Histologic Type: Infiltrating ductal carcinoma of no special type     - Tumor Size (pT2): Gross measurement is 20 mm, but tumor extends microscopically a few mm beyond the main tumor mass and, thus is felt to exceed 20 mm in greatest dimiension     -  Grade (G2): Kingsley Histologic Score: 6 of 9         * Glandular (Acinar) / Tubular Differentiation: 3       * Nuclear Pleomorphism: 1-2       * Mitotic Rate: 1     - Tumor Focality: Unifocal     - Macroscopic and Microscopic Extent of Tumor:       -- Skin: None included with specimen       -- Nipple: None included with specimen       -- Skeletal muscle: No tumor seen  3  Lymphovascular invasion: None identified    4  Dermal lymphovascular invasion: No skin included with specimen   5  In situ tumor     - Ductal carcinoma in Situ (DCIS): Present       * Size (Extent): Comprises less than 10% of tumor       * Architectural Pattern(s): Solid and cribriform       * Nuclear Grade: 1-2/3       * Necrosis: Minimal     - Lobular Carcinoma In Situ (LCIS): None seen   6  Margins:     - Invasive Carcinoma: No invasive tumor at or within 3 mm of any margins     - DCIS:  One small focus of DCIS is seen approximately 1 mm from an inked medial margin   7  Lymph nodes (pN0: No metastatic tumor seen     - Number of lymph nodes examined: 2     - Number of sentinel lymph nodes examined: 2     - Method of evaluation of sentinel lymph nodes: 3 H&E stained levels   8   Treatment Effect, Response to Presurgical (Neoadjuvant) Therapy: None seen 9  Additional Pathologic Findings: Biopsy site changes are seen  10  Microcalcifications: None of significance  11  Ancillary Studies:      - Prior biopsy ([id number]) tested positively for Estrogen and Progesterone receptors and negatively for HER2/benito protein overexpression      - Best representative tumor block:C13        -- Sufficient tumor present for          Agendia Mammaprint/Blueprint (1 cm2 of invasive tumor in aggregate): Yes  MI Profile/Foundation One (at least 5 x 5 mm of tumor): Yes  12  Clinical History: None of significance  13  Anatomic Stage Group (pTNM, AJCC 8th Edition):       - at least Stage IIA - pT2, pN0, cM0, G2   14  8th ed  AJCC Pathologic Prognostic Stage Group: IA    ASSESSMENT  1  Malignant neoplasm of upper-outer quadrant of right breast in female, estrogen receptor positive ([facility] [address] )  Radiation Simulation Treatment     Cancer Staging  Malignant neoplasm of right female breast ([facility] [address] )  Staging form: Breast, AJCC 8th Edition  - Clinical: No stage assigned - Unsigned  - Pathologic: Stage IA (pT2, pN0, cM0, G2, ER: Positive, WA: Positive, HER2: Negative) - Signed by Shreya Lua MD on 1/11/2019        PLAN/DISCUSSION  Orders Placed This Encounter   Procedures   [address]30 Iván Yates is a 59y o  year old female with T2 N0 (stage IIA) invasive ductal carcinoma of the right breast status post breast conservation surgery  Her tumor is ERPR positive her 2-  She has Oncotype DX ordered however results are pending  She has been evaluated by Medical Oncology who is awaiting her on good DX score for systemic management  I reviewed with her the role of adjuvant whole breast radiation  She understands the goal of treatment is to reduce her risk of recurrence  The procedure involved as well as possible side effects were reviewed with her    We discussed both standard fractionation with 25 treatments as well as a hypo fractionated course with 16 treatments depending on her planning parameters  A boost will be applied to the primary site in light of her close DCIS margin  The possible acute as well as long-term side effects were reviewed with her in detail  We have tentatively scheduled her for  Simulation after she follows up with Dr David Nieto  She is aware that should she be recommended systemic chemotherapy that would proceed her radiation treatment  She is agreeable to the above outlined plan  Jose Ernst MD  1/11/2019,11:08 AM      Portions of the record may have been created with voice recognition software   Occasional wrong word or "sound a like" substitutions may have occurred due to the inherent limitations of voice recognition software   Read the chart carefully and recognize, using context, where substitutions have occurred

## 2019-01-14 LAB — SCAN RESULT: NORMAL

## 2019-01-18 ENCOUNTER — APPOINTMENT (OUTPATIENT)
Dept: LAB | Facility: HOSPITAL | Age: 64
End: 2019-01-18
Attending: INTERNAL MEDICINE
Payer: COMMERCIAL

## 2019-01-18 DIAGNOSIS — C50.411 MALIGNANT NEOPLASM OF UPPER-OUTER QUADRANT OF RIGHT BREAST IN FEMALE, ESTROGEN RECEPTOR POSITIVE (HCC): ICD-10-CM

## 2019-01-18 DIAGNOSIS — Z17.0 MALIGNANT NEOPLASM OF UPPER-OUTER QUADRANT OF RIGHT BREAST IN FEMALE, ESTROGEN RECEPTOR POSITIVE (HCC): ICD-10-CM

## 2019-01-18 LAB
25(OH)D3 SERPL-MCNC: 17.8 NG/ML (ref 30–100)
ALBUMIN SERPL BCP-MCNC: 3.8 G/DL (ref 3–5.2)
ALP SERPL-CCNC: 107 U/L (ref 43–122)
ALT SERPL W P-5'-P-CCNC: 21 U/L (ref 9–52)
ANION GAP SERPL CALCULATED.3IONS-SCNC: 7 MMOL/L (ref 5–14)
AST SERPL W P-5'-P-CCNC: 20 U/L (ref 14–36)
BASOPHILS # BLD AUTO: 0.1 THOUSANDS/ΜL (ref 0–0.1)
BASOPHILS NFR BLD AUTO: 1 % (ref 0–1)
BILIRUB SERPL-MCNC: 0.4 MG/DL
BUN SERPL-MCNC: 15 MG/DL (ref 5–25)
CALCIUM SERPL-MCNC: 9.1 MG/DL (ref 8.4–10.2)
CHLORIDE SERPL-SCNC: 106 MMOL/L (ref 97–108)
CO2 SERPL-SCNC: 27 MMOL/L (ref 22–30)
CREAT SERPL-MCNC: 0.66 MG/DL (ref 0.6–1.2)
EOSINOPHIL # BLD AUTO: 0.3 THOUSAND/ΜL (ref 0–0.4)
EOSINOPHIL NFR BLD AUTO: 3 % (ref 0–6)
ERYTHROCYTE [DISTWIDTH] IN BLOOD BY AUTOMATED COUNT: 14.6 %
GFR SERPL CREATININE-BSD FRML MDRD: 109 ML/MIN/1.73SQ M
GLUCOSE SERPL-MCNC: 98 MG/DL (ref 70–99)
HCT VFR BLD AUTO: 37.7 % (ref 36–46)
HGB BLD-MCNC: 12.2 G/DL (ref 12–16)
LYMPHOCYTES # BLD AUTO: 4 THOUSANDS/ΜL (ref 0.5–4)
LYMPHOCYTES NFR BLD AUTO: 39 % (ref 25–45)
MCH RBC QN AUTO: 28.4 PG (ref 26–34)
MCHC RBC AUTO-ENTMCNC: 32.3 G/DL (ref 31–36)
MCV RBC AUTO: 88 FL (ref 80–100)
MONOCYTES # BLD AUTO: 0.6 THOUSAND/ΜL (ref 0.2–0.9)
MONOCYTES NFR BLD AUTO: 6 % (ref 1–10)
NEUTROPHILS # BLD AUTO: 5.4 THOUSANDS/ΜL (ref 1.8–7.8)
NEUTS SEG NFR BLD AUTO: 52 % (ref 45–65)
PLATELET # BLD AUTO: 332 THOUSANDS/UL (ref 150–450)
PMV BLD AUTO: 9.5 FL (ref 8.9–12.7)
POTASSIUM SERPL-SCNC: 4.1 MMOL/L (ref 3.6–5)
PROT SERPL-MCNC: 7.4 G/DL (ref 5.9–8.4)
RBC # BLD AUTO: 4.29 MILLION/UL (ref 4–5.2)
SODIUM SERPL-SCNC: 140 MMOL/L (ref 137–147)
WBC # BLD AUTO: 10.4 THOUSAND/UL (ref 4.5–11)

## 2019-01-18 PROCEDURE — 36415 COLL VENOUS BLD VENIPUNCTURE: CPT

## 2019-01-18 PROCEDURE — 82306 VITAMIN D 25 HYDROXY: CPT

## 2019-01-18 PROCEDURE — 80053 COMPREHEN METABOLIC PANEL: CPT

## 2019-01-18 PROCEDURE — 85025 COMPLETE CBC W/AUTO DIFF WBC: CPT

## 2019-01-25 ENCOUNTER — OFFICE VISIT (OUTPATIENT)
Dept: HEMATOLOGY ONCOLOGY | Facility: CLINIC | Age: 64
End: 2019-01-25
Payer: COMMERCIAL

## 2019-01-25 VITALS
BODY MASS INDEX: 42.94 KG/M2 | SYSTOLIC BLOOD PRESSURE: 160 MMHG | WEIGHT: 267.2 LBS | OXYGEN SATURATION: 98 % | DIASTOLIC BLOOD PRESSURE: 96 MMHG | TEMPERATURE: 97.9 F | HEIGHT: 66 IN | RESPIRATION RATE: 16 BRPM | HEART RATE: 73 BPM

## 2019-01-25 DIAGNOSIS — E55.9 VITAMIN D DEFICIENCY: ICD-10-CM

## 2019-01-25 DIAGNOSIS — Z17.0 MALIGNANT NEOPLASM OF UPPER-OUTER QUADRANT OF RIGHT BREAST IN FEMALE, ESTROGEN RECEPTOR POSITIVE (HCC): Primary | ICD-10-CM

## 2019-01-25 DIAGNOSIS — C50.411 MALIGNANT NEOPLASM OF UPPER-OUTER QUADRANT OF RIGHT BREAST IN FEMALE, ESTROGEN RECEPTOR POSITIVE (HCC): Primary | ICD-10-CM

## 2019-01-25 PROCEDURE — 99214 OFFICE O/P EST MOD 30 MIN: CPT | Performed by: INTERNAL MEDICINE

## 2019-01-25 RX ORDER — ERGOCALCIFEROL 1.25 MG/1
50000 CAPSULE ORAL WEEKLY
Qty: 8 CAPSULE | Refills: 0 | Status: SHIPPED | OUTPATIENT
Start: 2019-01-25 | End: 2019-03-29 | Stop reason: SDUPTHER

## 2019-01-25 RX ORDER — ANASTROZOLE 1 MG/1
1 TABLET ORAL DAILY
Qty: 30 TABLET | Refills: 5 | Status: SHIPPED | OUTPATIENT
Start: 2019-01-25 | End: 2020-07-03

## 2019-01-25 NOTE — PROGRESS NOTES
Hematology/Oncology Outpatient Follow-up  Mima Mclaughlin 59 y o  female 1955 [id number]    Date:  1/25/2019        Assessment and Plan:  1  Malignant neoplasm of upper-outer quadrant of right breast in female, estrogen receptor positive ([address] )  Patient was educated about the Oncotype recurrence score which puts her in the low risk category  She was told that adjuvant chemotherapy will not be beneficial at all  However, she would benefit from endocrine therapy adjuvantly  We did discuss again anastrozole and the potential side effects  The patient will get a bone density scan as baseline and get started on the anastrozole in the near future once she consults with the Radiation Oncology team   She stated that she has an appointment with the radiation oncologist next week to discuss adjuvant radiation treatment to the right breast   - anastrozole (ARIMIDEX) 1 mg tablet; Take 1 tablet (1 mg total) by mouth daily  Dispense: 30 tablet; Refill: 5  - CBC and differential; Future  - Comprehensive metabolic panel; Future  - Vitamin D 25 hydroxy  - DXA bone density spine hip and pelvis; Future    2  Vitamin D deficiency  She was found to have low vitamin-D level which will be treated with high-dose vitamin-D once a week for 8 weeks on top of daily supplements  - ergocalciferol (VITAMIN D2) 50,000 units; Take 1 capsule (50,000 Units total) by mouth once a week for 8 doses  Dispense: 8 capsule; Refill: 0        HPI:  Patient came today for a follow-up visit  Her Oncotype recurrence score came back at 13 putting her in the low risk category  The patient will be seen by the Radiation Oncology team next week for adjuvant radiation of the right breast   Her most recent blood work on the 18th of January showed very low vitamin-D level at 17 8 with normal CBC and CMP  The patient otherwise is asymptomatic  Oncology History    This is a 20-year-old female with history of hypertension and obesity    Her 1st menses was at age 16 and the 1st 5 children at age 25  The patient has no family history of breast cancer or ovarian cancer  She had her 1st screening bilateral mammogram in June 2018 which showed a right-sided asymmetry  A right-sided diagnostic mammogram was done on the 15th November 2018 which showed spiculated mass  An ultrasound revealed a solid mass measuring 1 6 cm in greatest dimension at 11 o'clock 8 cm from the nipple  She then had a right ultrasound-guided core biopsy on the 23rd of November which showed intermediate grade ER positive 100% , ID positive 95%, her 2 Cesilia negative by IHC invasive ductal carcinoma  She then had her definitive surgery on the 20th December 2018 where she underwent a right-sided partial mastectomy with sentinel lymph node biopsy  The pathology showed 2-sentinel lymph nodes for malignancy  Her tumor was grade 2 infiltrating ductal carcinoma with the size between 2 cm to 2 5 cm in greatest dimension  She did have also focus of DCIS with an approximately 1 mm of the inked medial margin  No lymphovascular invasion was reported, all the margins were clear  Her final pathology was pT2 pN0 M 0 G2 or stage IIA  The Oncotype recurrence score was 13  Malignant neoplasm of right female breast ([address] )    11/23/2018 Initial Diagnosis     Malignant neoplasm of right female breast ([facility] [address] )         11/23/2018 Biopsy     Rt Breast US BX 1100 8cmfn, 4 passes 12g Marquee:  - Invasive breast carcinoma of no special type (ductal NST/invasive ductal carcinoma)    - grade 2  - %  - ID 95%  - HER2 negative         12/20/2018 Surgery     Right partial mastectomy and SLN biopsy:  Infiltrating ductal carcinoma, Grade 2/3, felt to be between 2 0 cm and 2 5 cm in greatest dimension  - No invasive tumor is seen at inked margins, but there is a focus of DCIS seen within approximately 1mm of the inked medial margin  - no LVI  - no LCIS  - 0/2 LN's  at least Stage IIA - pT2, pN0, cM0, G2     - Dr Ivonne         12/20/2018 -  Cancer Staged     Stage IIA - pT2, pN0, cM0, G2            1/4/2019 Genomic Testing     Oncotype DX score: pending         1/25/2019 -  Chemotherapy     She was started on anastrozole as adjuvant endocrine therapy            Interval history:    ROS: Review of Systems   Constitutional: Positive for fatigue  Negative for activity change, appetite change, chills, diaphoresis, fever and unexpected weight change  HENT: Negative for congestion, dental problem, ear discharge, ear pain, facial swelling, hearing loss, mouth sores, nosebleeds, postnasal drip, sore throat, tinnitus and trouble swallowing  Eyes: Negative for discharge, redness, itching and visual disturbance  Respiratory: Negative for cough, chest tightness, shortness of breath and wheezing  Cardiovascular: Negative for chest pain, palpitations and leg swelling  Gastrointestinal: Negative for abdominal distention, abdominal pain, anal bleeding, blood in stool, constipation, diarrhea, nausea and vomiting  Genitourinary: Negative for difficulty urinating, dysuria, flank pain, frequency, hematuria and urgency  Musculoskeletal: Negative for arthralgias, back pain, gait problem, joint swelling, myalgias and neck pain  Skin: Negative for color change, pallor, rash and wound  Neurological: Negative for dizziness, syncope, speech difficulty, weakness, light-headedness, numbness and headaches  Hematological: Negative for adenopathy  Does not bruise/bleed easily  Psychiatric/Behavioral: Negative for agitation, behavioral problems, confusion and sleep disturbance         Past Medical History:   Diagnosis Date    Anemia     Arthritis     Breast cancer [facility] [address] )     Cancer ([facility] )     right breast    GERD (gastroesophageal reflux disease)     Hyperlipidemia     Hypertension     Stroke ([facility] )     TIA        Past Surgical History:   Procedure Laterality Date    BREAST SURGERY      ME MASTECTOMY,PARTIAL,  WITH AXILLARY LYMPHADENECTOMY Right 12/20/2018    Procedure: ULTRASOUND LOCALIZED PARTIAL MASTECTOMY W/SENTINEL NODE BIOPSY POSS AXILLARY DISSECTION;  Surgeon: Shirley Ramires MD;  Location:  MAIN OR;  Service: General    TUBAL LIGATION      US GUIDED BREAST BIOPSY RIGHT COMPLETE Right 11/23/2018       Social History     Social History    Marital status: Single     Spouse name: N/A    Number of children: N/A    Years of education: N/A     Social History Main Topics    Smoking status: Never Smoker    Smokeless tobacco: Never Used      Comment: NO TOBACCO USE    Alcohol use No    Drug use: No    Sexual activity: Not Asked     Other Topics Concern    None     Social History Narrative    None       Family History   Problem Relation Age of Onset    Colon cancer Mother 62    Hypertension Mother     Cancer Father     Pancreatic cancer Father 61    Hypertension Son     Hypertension Daughter        Allergies   Allergen Reactions    No Active Allergies          Current Outpatient Prescriptions:     atorvastatin (LIPITOR) 80 mg tablet, Take 1 tablet (80 mg total) by mouth every 24 hours, Disp: 30 tablet, Rfl: 2    fluticasone (FLONASE) 50 mcg/act nasal spray, 2 sprays into each nostril daily, Disp: 16 g, Rfl: 2    folic acid (FOLVITE) 1 mg tablet, Take 1 tablet (1 mg total) by mouth every 24 hours, Disp: 30 tablet, Rfl: 5    losartan (COZAAR) 50 mg tablet, Take 1 tablet (50 mg total) by mouth daily, Disp: 30 tablet, Rfl: 2    anastrozole (ARIMIDEX) 1 mg tablet, Take 1 tablet (1 mg total) by mouth daily, Disp: 30 tablet, Rfl: 5      Physical Exam:  /96 (BP Location: Left arm, Cuff Size: Large)   Pulse 73   Temp 97 9 °F (36 6 °C) (Tympanic)   Resp 16   Ht 5' 6" (1 676 m)   Wt 121 kg (267 lb 3 2 oz)   SpO2 98%   BMI 43 13 kg/m²     Physical Exam   Constitutional: She is oriented to person, place, and time  She appears well-developed and well-nourished  No distress     HENT:   Head: Normocephalic and atraumatic  Nose: Nose normal    Mouth/Throat: Oropharynx is clear and moist    Eyes: Pupils are equal, round, and reactive to light  Conjunctivae and EOM are normal  Right eye exhibits no discharge  Left eye exhibits no discharge  No scleral icterus  Neck: Normal range of motion  Neck supple  No JVD present  No tracheal deviation present  No thyromegaly present  Cardiovascular: Normal rate, regular rhythm and normal heart sounds  Exam reveals no friction rub  No murmur heard  Pulmonary/Chest: Effort normal and breath sounds normal  No stridor  No respiratory distress  She has no wheezes  She has no rales  She exhibits no tenderness  Abdominal: Soft  Bowel sounds are normal  She exhibits no distension and no mass  There is no hepatosplenomegaly, splenomegaly or hepatomegaly  There is no tenderness  There is no rebound and no guarding  Musculoskeletal: Normal range of motion  She exhibits no edema, tenderness or deformity  Lymphadenopathy:     She has no cervical adenopathy  Neurological: She is alert and oriented to person, place, and time  She has normal reflexes  No cranial nerve deficit  Coordination normal    Skin: Skin is warm and dry  No rash noted  She is not diaphoretic  No erythema  No pallor  Psychiatric: She has a normal mood and affect  Her behavior is normal  Judgment and thought content normal          Labs:  Lab Results   Component Value Date    WBC 10 40 01/18/2019    HGB 12 2 01/18/2019    HCT 37 7 01/18/2019    MCV 88 01/18/2019     01/18/2019     Lab Results   Component Value Date    K 4 1 01/18/2019     01/18/2019    CO2 27 01/18/2019    BUN 15 01/18/2019    CREATININE 0 66 01/18/2019    CALCIUM 9 1 01/18/2019    AST 20 01/18/2019    ALT 21 01/18/2019    ALKPHOS 107 01/18/2019    EGFR 109 01/18/2019     No results found for: TSH    Patient voiced understanding and agreement in the above discussion   Aware to contact our office with questions/symptoms in the interim

## 2019-02-05 ENCOUNTER — APPOINTMENT (OUTPATIENT)
Dept: RADIATION ONCOLOGY | Facility: CLINIC | Age: 64
End: 2019-02-05
Attending: RADIOLOGY
Payer: COMMERCIAL

## 2019-02-05 ENCOUNTER — DOCUMENTATION (OUTPATIENT)
Dept: HEMATOLOGY ONCOLOGY | Facility: CLINIC | Age: 64
End: 2019-02-05

## 2019-02-05 ENCOUNTER — CLINICAL SUPPORT (OUTPATIENT)
Dept: RADIATION ONCOLOGY | Facility: CLINIC | Age: 64
End: 2019-02-05

## 2019-02-05 VITALS
HEIGHT: 66 IN | BODY MASS INDEX: 43.42 KG/M2 | RESPIRATION RATE: 18 BRPM | OXYGEN SATURATION: 98 % | TEMPERATURE: 97.5 F | WEIGHT: 270.2 LBS | DIASTOLIC BLOOD PRESSURE: 90 MMHG | HEART RATE: 60 BPM | SYSTOLIC BLOOD PRESSURE: 138 MMHG

## 2019-02-05 DIAGNOSIS — C50.411 MALIGNANT NEOPLASM OF UPPER-OUTER QUADRANT OF RIGHT BREAST IN FEMALE, ESTROGEN RECEPTOR POSITIVE (HCC): Primary | ICD-10-CM

## 2019-02-05 DIAGNOSIS — Z17.0 MALIGNANT NEOPLASM OF UPPER-OUTER QUADRANT OF RIGHT BREAST IN FEMALE, ESTROGEN RECEPTOR POSITIVE (HCC): Primary | ICD-10-CM

## 2019-02-05 PROCEDURE — 99215 OFFICE O/P EST HI 40 MIN: CPT | Performed by: RADIOLOGY

## 2019-02-05 PROCEDURE — 77290 THER RAD SIMULAJ FIELD CPLX: CPT | Performed by: RADIOLOGY

## 2019-02-05 PROCEDURE — 77334 RADIATION TREATMENT AID(S): CPT | Performed by: RADIOLOGY

## 2019-02-05 RX ORDER — LANOLIN ALCOHOL/MO/W.PET/CERES
1000 CREAM (GRAM) TOPICAL DAILY
COMMUNITY
End: 2019-08-16 | Stop reason: SDUPTHER

## 2019-02-05 NOTE — PROGRESS NOTES
Oncology Navigator Visit  Breast Nurse Navigator    Met patient during radiation appointment today 02/05/19  Reintroduced myself as I have been unable to reach patient after initial phone outreach  Patient reporting she is doing well post surgical and also tolerating Arimidex well to date, denies any side effects  Briefly instructed on possible side effects and encouraged reporting intolerable side effects when noted  Patient reports she lives with supportive son  I discussed available cancer support service, patient reporting she needs assistance with transportation to get to her daily radiation treatments  Patient aware that Randall WEINBERG will discuss assistance available for transportation following our visit  Provided patient with my contact information and availability and encouraged to call as needed with any questions, concerns or needs  Also informed that I will call her to check in to see how she is doing and to discuss any needs  Patient agreeable and verbalized understanding

## 2019-02-05 NOTE — PROGRESS NOTES
Follow-up - Radiation Oncology   [facility] 1955 59 y o  female [id number]      History of Present Illness   Cancer Staging  Malignant neoplasm of right female breast ([facility] [address] )  Staging form: Breast, AJCC 8th Edition  - Clinical: No stage assigned - Unsigned  - Pathologic: Stage IA (pT2, pN0, cM0, G2, ER: Positive, UT: Positive, HER2: Negative) - Signed by Mike Romeo MD on 1/11/2019          Interval History:    Tana Olsen a 59year old female recently diagnosed with stage IA pT2, pN0, cM0, G2, ER: Positive, UT: Positive, HER2: Negative right breast cancer       Patient presented with abnormal screening mammogram, done in June 2018  This was her first mammogram  She denies breast pain, or changes with skin/nipple       6/15/18   Digital Bilateral Screening Mammogram:  R UOQ focal asymmetry      11/15/18   Mammo diagnostic right w cad: There is a 14 mm x 14 mm x 16 mm high density, irregularly shaped mass with spiculated margins seen in the upper outer quadrant of the right breast in the middle depth, 8 cm from the nipple  US breast right limited (diagnostic): There is a 15 mm x 12 mm x 16 mm irregularly shaped, hypoechoic mass with spiculated margins with shadowing seen in the upper outer quadrant of the right breast at 11 o'clock in the middle depth, 8 cm from the nipple  There are no suspicious findings in the right axilla  BI-RADS 4      11/23/18   US guided right breast biopsy:  IDC, grade 2, ER/UT +, HER2 -      12/20/18   Right partial mastectomy and SLNB by Dr Niraj Prince     Pathology was consistent with invasive ductal carcinoma measuring between 2 -2 5 cm  Ariadne Flax is no invasive tumor at the margin but a focus of DCIS was within 1 mm of the medial margin  Two sentinel lymph nodes were negative for metastasis   The tumor was grade 2     Her postoperative course has been uneventful     1/2/19 Dr Marcus Pina post op visit  - incisions healing well  - Oncotype DX ordered  - refer to radiation oncology for whole breast RT and medical oncology     She was seen for radiation oncology consult on 1/11/19  The role of adjuvant whole breast radiation was reviewed with her       1/25/19 follow up with Dr Brittany Boswell  - Oncotype recurrence score of 13, putting her in the low risk category  - no adjuvant chemotherapy recommended  - recommending adjuvant endocrine therapy with anastrozole 1 mg daily             Historical Information      Malignant neoplasm of right female breast ([place] [address] )    11/23/2018 Initial Diagnosis     Malignant neoplasm of right female breast ([place] [address] )         11/23/2018 Biopsy     Rt Breast US BX 1100 8cmfn, 4 passes 12g Marquee:  - Invasive breast carcinoma of no special type (ductal NST/invasive ductal carcinoma)    - grade 2  - %  - HI 95%  - HER2 negative         12/20/2018 Surgery     Right partial mastectomy and SLN biopsy:  Infiltrating ductal carcinoma, Grade 2/3, felt to be between 2 0 cm and 2 5 cm in greatest dimension  - No invasive tumor is seen at inked margins, but there is a focus of DCIS seen within approximately 1mm of the inked medial margin  - no LVI  - no LCIS  - 0/2 LN's  at least Stage IIA - pT2, pN0, cM0, G2     - Dr Parmjit Perez         12/20/2018 -  Cancer Staged     Stage IIA - pT2, pN0, cM0, G2            1/4/2019 Genomic Testing     Oncotype DX score: 13         2/1/2019 -  Hormone Therapy     anastrozole 1 mg daily as adjuvant endocrine therapy    - Dr Brittany Boswell              Past Medical History:   Diagnosis Date    Anemia     Arthritis     Breast cancer ([place] [address] )     Cancer ([place] [address] )     right breast    GERD (gastroesophageal reflux disease)     Hyperlipidemia     Hypertension     Stroke ([place] [address] )     TIA      Past Surgical History:   Procedure Laterality Date    BREAST SURGERY      HI MASTECTOMY,PARTIAL,  WITH AXILLARY LYMPHADENECTOMY Right 12/20/2018    Procedure: ULTRASOUND LOCALIZED PARTIAL MASTECTOMY W/SENTINEL NODE BIOPSY POSS AXILLARY DISSECTION;  Surgeon: Shirley Ramires MD;  Location: [facility] MAIN OR;  Service: General    TUBAL LIGATION      US GUIDED BREAST BIOPSY RIGHT COMPLETE Right 11/23/2018       Social History   History   Alcohol Use No     History   Drug Use No     History   Smoking Status    Never Smoker   Smokeless Tobacco    Never Used     Comment: NO TOBACCO USE         Meds/Allergies     Current Outpatient Prescriptions:     anastrozole (ARIMIDEX) 1 mg tablet, Take 1 tablet (1 mg total) by mouth daily, Disp: 30 tablet, Rfl: 5    atorvastatin (LIPITOR) 80 mg tablet, Take 1 tablet (80 mg total) by mouth every 24 hours, Disp: 30 tablet, Rfl: 2    b complex vitamins tablet, Take 1 tablet by mouth daily, Disp: , Rfl:     cyanocobalamin (VITAMIN B-12) 1,000 mcg tablet, Take 1,000 mcg by mouth daily, Disp: , Rfl:     ergocalciferol (VITAMIN D2) 50,000 units, Take 1 capsule (50,000 Units total) by mouth once a week for 8 doses, Disp: 8 capsule, Rfl: 0    fluticasone (FLONASE) 50 mcg/act nasal spray, 2 sprays into each nostril daily, Disp: 16 g, Rfl: 2    folic acid (FOLVITE) 1 mg tablet, Take 1 tablet (1 mg total) by mouth every 24 hours, Disp: 30 tablet, Rfl: 5    losartan (COZAAR) 50 mg tablet, Take 1 tablet (50 mg total) by mouth daily, Disp: 30 tablet, Rfl: 2  Allergies   Allergen Reactions    No Active Allergies          Review of Systems    Constitutional: Negative  HENT: Negative  Eyes: Negative  Respiratory: Negative  Cardiovascular: Negative  Gastrointestinal: Negative  Endocrine: Negative  Genitourinary: Positive for frequency and urgency (sometimes)  Nocturia x2  No incontinence  Good urine stream     Musculoskeletal:        Daily joint pain in her hands   Skin: Negative  Allergic/Immunologic: Negative  Neurological: Negative  Hematological: Negative      Psychiatric/Behavioral: Negative             OBJECTIVE:   /90   Pulse 60   Temp 97 5 °F (36 4 °C) (Temporal)   Resp 18   Ht 5' 6" (1 676 m) Wt 123 kg (270 lb 3 2 oz)   SpO2 98%   BMI 43 61 kg/m²   Pain Assessment:  0  ECOG/Zubrod/WHO: 0 - Asymptomatic    Physical Exam   Her incisions are well healed  No significant breast edema  Abdomen soft nontender        RESULTS    Lab Results:   Recent Results (from the past 672 hour(s))   CBC and differential    Collection Time: 01/18/19 11:26 AM   Result Value Ref Range    WBC 10 40 4 50 - 11 00 Thousand/uL    RBC 4 29 4 00 - 5 20 Million/uL    Hemoglobin 12 2 12 0 - 16 0 g/dL    Hematocrit 37 7 36 0 - 46 0 %    MCV 88 80 - 100 fL    MCH 28 4 26 0 - 34 0 pg    MCHC 32 3 31 0 - 36 0 g/dL    RDW 14 6 <15 3 %    MPV 9 5 8 9 - 12 7 fL    Platelets 162 569 - 803 Thousands/uL    Neutrophils Relative 52 45 - 65 %    Lymphocytes Relative 39 25 - 45 %    Monocytes Relative 6 1 - 10 %    Eosinophils Relative 3 0 - 6 %    Basophils Relative 1 0 - 1 %    Neutrophils Absolute 5 40 1 80 - 7 80 Thousands/µL    Lymphocytes Absolute 4 00 0 50 - 4 00 Thousands/µL    Monocytes Absolute 0 60 0 20 - 0 90 Thousand/µL    Eosinophils Absolute 0 30 0 00 - 0 40 Thousand/µL    Basophils Absolute 0 10 0 00 - 0 10 Thousands/µL   Comprehensive metabolic panel    Collection Time: 01/18/19 11:26 AM   Result Value Ref Range    Sodium 140 137 - 147 mmol/L    Potassium 4 1 3 6 - 5 0 mmol/L    Chloride 106 97 - 108 mmol/L    CO2 27 22 - 30 mmol/L    ANION GAP 7 5 - 14 mmol/L    BUN 15 5 - 25 mg/dL    Creatinine 0 66 0 60 - 1 20 mg/dL    Glucose 98 70 - 99 mg/dL    Calcium 9 1 8 4 - 10 2 mg/dL    AST 20 14 - 36 U/L    ALT 21 9 - 52 U/L    Alkaline Phosphatase 107 43 - 122 U/L    Total Protein 7 4 5 9 - 8 4 g/dL    Albumin 3 8 3 0 - 5 2 g/dL    Total Bilirubin 0 40 <1 30 mg/dL    eGFR 109 >60 ml/min/1 73sq m   Vitamin D 25 hydroxy    Collection Time: 01/18/19 11:26 AM   Result Value Ref Range    Vit D, 25-Hydroxy 17 8 (L) 30 0 - 100 0 ng/mL       Imaging Studies:No results found          Assessment/Plan:  Orders Placed This Encounter Procedures    Radiation Simulation Treatment        Zaire Mendez is a 59y o  year old female with a T2 N0 right breast carcinoma status post breast conservation surgery  She returns after follow-up with Dr Annalise Woods  Her Oncotype DX returned 13 and she has been recommended adjuvant hormone therapy without any chemotherapy  We discussed proceeding with CT simulation today  I have reviewed with her whole breast radiation followed by boost to the primary site  She is agreeable to the above outlined plan  She will meet with our counselor today as she has some transportation issues  Tommy Wood MD  2/5/2019,1:26 PM    Portions of the record may have been created with voice recognition software   Occasional wrong word or "sound a like" substitutions may have occurred due to the inherent limitations of voice recognition software   Read the chart carefully and recognize, using context, where substitutions have occurred

## 2019-02-05 NOTE — PROGRESS NOTES
Vilma Padron  1955   Ms Khadar Drake is a 59 y o  female       Chief Complaint   Patient presents with    Follow-up     radiation oncology       Cancer Staging  Malignant neoplasm of right female breast [facility])  Staging form: Breast, AJCC 8th Edition  - Clinical: No stage assigned - Unsigned  - Pathologic: Stage IA (pT2, pN0, cM0, G2, ER: Positive, SD: Positive, HER2: Negative) - Signed by Roxana Martinez MD on 1/11/2019    Vilma Padron is a 59year old female recently diagnosed with stage IA pT2, pN0, cM0, G2, ER: Positive, SD: Positive, HER2: Negative right breast cancer       Patient presented with abnormal screening mammogram, done in June 2018  This was her first mammogram  She denies breast pain, or changes with skin/nipple       6/15/18   Digital Bilateral Screening Mammogram:  R UOQ focal asymmetry      11/15/18   Mammo diagnostic right w cad: There is a 14 mm x 14 mm x 16 mm high density, irregularly shaped mass with spiculated margins seen in the upper outer quadrant of the right breast in the middle depth, 8 cm from the nipple  US breast right limited (diagnostic): There is a 15 mm x 12 mm x 16 mm irregularly shaped, hypoechoic mass with spiculated margins with shadowing seen in the upper outer quadrant of the right breast at 11 o'clock in the middle depth, 8 cm from the nipple  There are no suspicious findings in the right axilla  BI-RADS 4      11/23/18   US guided right breast biopsy:  IDC, grade 2, ER/SD +, HER2 -      12/20/18   Right partial mastectomy and SLNB by Dr Beth Antoine     Pathology was consistent with invasive ductal carcinoma measuring between 2 -2 5 cm  There is no invasive tumor at the margin but a focus of DCIS was within 1 mm of the medial margin  Two sentinel lymph nodes were negative for metastasis   The tumor was grade 2     Her postoperative course has been uneventful     1/2/19 Dr Felicita Sim post op visit  - incisions healing well  - Oncotype DX ordered  - refer to radiation oncology for whole breast RT and medical oncology    She was seen for radiation oncology consult on 1/11/19  The role of adjuvant whole breast radiation was reviewed with her  Oncotype Dx results were pending              Malignant neoplasm of right female breast ([facility] [address] )    11/23/2018 Initial Diagnosis     Malignant neoplasm of right female breast ([facility] [address] )         11/23/2018 Biopsy     Rt Breast US BX 1100 8cmfn, 4 passes 12g Marquee:  - Invasive breast carcinoma of no special type (ductal NST/invasive ductal carcinoma)    - grade 2  - %  - AZ 95%  - HER2 negative         12/20/2018 Surgery     Right partial mastectomy and SLN biopsy:  Infiltrating ductal carcinoma, Grade 2/3, felt to be between 2 0 cm and 2 5 cm in greatest dimension  - No invasive tumor is seen at inked margins, but there is a focus of DCIS seen within approximately 1mm of the inked medial margin  - no LVI  - no LCIS  - 0/2 LN's  at least Stage IIA - pT2, pN0, cM0, G2     - Dr Erin Crowder         12/20/2018 -  Cancer Staged     Stage IIA - pT2, pN0, cM0, G2            1/4/2019 Genomic Testing     Oncotype DX score: 13         2/1/2019 -  Hormone Therapy     anastrozole 1 mg daily as adjuvant endocrine therapy    - Dr Maritza Recio              Clinical Trial: no      Interval History:  1/25/19 follow up with Dr Maritza Recio  - Oncotype recurrence score of 13, putting her in the low risk category  - no adjuvant chemotherapy recommended  - recommending adjuvant endocrine therapy with anastrozole 1 mg daily        Scheduled Appointments:  2/12/19 DEXA scan  2/12/19 PCP  4/25/19 Dr Maritza Recio      Screening  Tobacco  Current tobacco user: no  If yes, brief counseling provided: No    Hypertension  Hypertension screening performed: yes  Normotensive:  no  If no, referred to PCP: no, BP is followed by PCP about every 3 months     Depression Screening  Screened for depression using PHQ-2: yes    Screened for depression using PHQ-9:  no  Screening positive or negative:  negative  If score >4, was any of the following actions taken?    Additional evaluation for depression, suicide risk assesment, referral to PCP or psychiatry, medication started:  no    Advanced Care Planning for Patients >65 years  Advanced Care Planning Discussed:  n/a  Patient named surrogate decision maker or care plan in chart: n/a    [unfilled]  Health Maintenance   Topic Date Due    Hepatitis C Screening  1955    CRC Screening: Colonoscopy  1955    Pneumococcal PPSV23 Highest Risk Adult (1 of 3 - PCV13) 02/13/1975    MAMMOGRAM  11/15/2019    Depression Screening PHQ  01/11/2020    DTaP,Tdap,and Td Vaccines (2 - Td) 04/25/2028    INFLUENZA VACCINE  Completed       Patient Active Problem List   Diagnosis    Benign essential hypertension    Vasomotor rhinitis    Mixed hyperlipidemia    Pre-op evaluation    Malignant neoplasm of right female breast ([facility] [address] )    Vitamin D deficiency     Past Medical History:   Diagnosis Date    Anemia     Arthritis     Breast cancer ([facility] [address] [facility]     Cancer ([facility] [address] )     right breast    GERD (gastroesophageal reflux disease)     Hyperlipidemia     Hypertension     Stroke ([facility] [address] )     TIA      Past Surgical History:   Procedure Laterality Date    BREAST SURGERY      WV MASTECTOMY,PARTIAL,  WITH AXILLARY LYMPHADENECTOMY Right 12/20/2018    Procedure: ULTRASOUND LOCALIZED PARTIAL MASTECTOMY W/SENTINEL NODE BIOPSY POSS AXILLARY DISSECTION;  Surgeon: Lamont Hoyt MD;  Location: [facility] MAIN OR;  Service: General    TUBAL LIGATION      US GUIDED BREAST BIOPSY RIGHT COMPLETE Right 11/23/2018     Family History   Problem Relation Age of Onset    Colon cancer Mother 62    Hypertension Mother     Cancer Father     Pancreatic cancer Father 61    Hypertension Son     Hypertension Daughter      Social History     Social History    Marital status: Single     Spouse name: N/A    Number of children: N/A    Years of education: N/A     Occupational History    Not on file  Social History Main Topics    Smoking status: Never Smoker    Smokeless tobacco: Never Used      Comment: NO TOBACCO USE    Alcohol use No    Drug use: No    Sexual activity: Not on file     Other Topics Concern    Not on file     Social History Narrative    No narrative on file       Current Outpatient Prescriptions:     anastrozole (ARIMIDEX) 1 mg tablet, Take 1 tablet (1 mg total) by mouth daily, Disp: 30 tablet, Rfl: 5    atorvastatin (LIPITOR) 80 mg tablet, Take 1 tablet (80 mg total) by mouth every 24 hours, Disp: 30 tablet, Rfl: 2    b complex vitamins tablet, Take 1 tablet by mouth daily, Disp: , Rfl:     cyanocobalamin (VITAMIN B-12) 1,000 mcg tablet, Take 1,000 mcg by mouth daily, Disp: , Rfl:     ergocalciferol (VITAMIN D2) 50,000 units, Take 1 capsule (50,000 Units total) by mouth once a week for 8 doses, Disp: 8 capsule, Rfl: 0    fluticasone (FLONASE) 50 mcg/act nasal spray, 2 sprays into each nostril daily, Disp: 16 g, Rfl: 2    folic acid (FOLVITE) 1 mg tablet, Take 1 tablet (1 mg total) by mouth every 24 hours, Disp: 30 tablet, Rfl: 5    losartan (COZAAR) 50 mg tablet, Take 1 tablet (50 mg total) by mouth daily, Disp: 30 tablet, Rfl: 2  Allergies   Allergen Reactions    No Active Allergies        Review of Systems:  Review of Systems   Constitutional: Negative  HENT: Negative  Eyes: Negative  Respiratory: Negative  Cardiovascular: Negative  Gastrointestinal: Negative  Endocrine: Negative  Genitourinary: Positive for frequency and urgency (sometimes)  Nocturia x2  No incontinence  Good urine stream     Musculoskeletal:        Daily joint pain in her hands   Skin: Negative  Allergic/Immunologic: Negative  Neurological: Negative  Hematological: Negative  Psychiatric/Behavioral: Negative          Vitals:    02/05/19 1254   BP: 138/90   Pulse: 60   Resp: 18   Temp: 97 5 °F (36 4 °C)   TempSrc: Temporal   SpO2: 98% Weight: 123 kg (270 lb 3 2 oz)   Height: 5' 6" (1 676 m)            Imaging:No results found

## 2019-02-07 ENCOUNTER — APPOINTMENT (OUTPATIENT)
Dept: RADIATION ONCOLOGY | Facility: HOSPITAL | Age: 64
End: 2019-02-07
Attending: RADIOLOGY
Payer: COMMERCIAL

## 2019-02-07 PROCEDURE — 77300 RADIATION THERAPY DOSE PLAN: CPT | Performed by: RADIOLOGY

## 2019-02-07 PROCEDURE — 77334 RADIATION TREATMENT AID(S): CPT | Performed by: RADIOLOGY

## 2019-02-07 PROCEDURE — 77295 3-D RADIOTHERAPY PLAN: CPT | Performed by: RADIOLOGY

## 2019-02-12 PROBLEM — Z01.818 PRE-OP EVALUATION: Status: RESOLVED | Noted: 2018-12-11 | Resolved: 2019-02-12

## 2019-02-13 ENCOUNTER — DOCUMENTATION (OUTPATIENT)
Dept: INFUSION CENTER | Facility: HOSPITAL | Age: 64
End: 2019-02-13

## 2019-02-13 PROCEDURE — 77417 THER RADIOLOGY PORT IMAGE(S): CPT | Performed by: STUDENT IN AN ORGANIZED HEALTH CARE EDUCATION/TRAINING PROGRAM

## 2019-02-13 PROCEDURE — 77280 THER RAD SIMULAJ FIELD SMPL: CPT | Performed by: STUDENT IN AN ORGANIZED HEALTH CARE EDUCATION/TRAINING PROGRAM

## 2019-02-13 NOTE — SOCIAL WORK
MSW met with the pt this day prior to her radiation treatment  The pt was initially seen at [facility] and met with LENARD Hargrove  The pt works at [employer] and takes the bus as her means of transportation  The pt did not report transportation as being an issue to this MSW, her issue was the timing of her appointment  Pt requested a later appointment as her travel time does not allow her to arrive at her scheduled time  MSW spoke with the radiation therpist and her time has been changed to later in the afternoon

## 2019-02-14 ENCOUNTER — APPOINTMENT (OUTPATIENT)
Dept: RADIATION ONCOLOGY | Facility: HOSPITAL | Age: 64
End: 2019-02-14
Attending: STUDENT IN AN ORGANIZED HEALTH CARE EDUCATION/TRAINING PROGRAM
Payer: COMMERCIAL

## 2019-02-14 PROCEDURE — 77331 SPECIAL RADIATION DOSIMETRY: CPT | Performed by: STUDENT IN AN ORGANIZED HEALTH CARE EDUCATION/TRAINING PROGRAM

## 2019-02-14 PROCEDURE — 77412 RADIATION TX DELIVERY LVL 3: CPT | Performed by: STUDENT IN AN ORGANIZED HEALTH CARE EDUCATION/TRAINING PROGRAM

## 2019-02-15 ENCOUNTER — APPOINTMENT (OUTPATIENT)
Dept: RADIATION ONCOLOGY | Facility: HOSPITAL | Age: 64
End: 2019-02-15
Payer: COMMERCIAL

## 2019-02-15 ENCOUNTER — APPOINTMENT (OUTPATIENT)
Dept: RADIATION ONCOLOGY | Facility: HOSPITAL | Age: 64
End: 2019-02-15
Attending: STUDENT IN AN ORGANIZED HEALTH CARE EDUCATION/TRAINING PROGRAM
Payer: COMMERCIAL

## 2019-02-15 PROCEDURE — 77412 RADIATION TX DELIVERY LVL 3: CPT | Performed by: STUDENT IN AN ORGANIZED HEALTH CARE EDUCATION/TRAINING PROGRAM

## 2019-02-18 ENCOUNTER — APPOINTMENT (OUTPATIENT)
Dept: RADIATION ONCOLOGY | Facility: HOSPITAL | Age: 64
End: 2019-02-18
Attending: STUDENT IN AN ORGANIZED HEALTH CARE EDUCATION/TRAINING PROGRAM
Payer: COMMERCIAL

## 2019-02-18 DIAGNOSIS — C50.919 MALIGNANT NEOPLASM OF FEMALE BREAST, UNSPECIFIED ESTROGEN RECEPTOR STATUS, UNSPECIFIED LATERALITY, UNSPECIFIED SITE OF BREAST (HCC): Primary | ICD-10-CM

## 2019-02-18 PROCEDURE — 77412 RADIATION TX DELIVERY LVL 3: CPT | Performed by: STUDENT IN AN ORGANIZED HEALTH CARE EDUCATION/TRAINING PROGRAM

## 2019-02-19 ENCOUNTER — APPOINTMENT (OUTPATIENT)
Dept: RADIATION ONCOLOGY | Facility: HOSPITAL | Age: 64
End: 2019-02-19
Attending: INTERNAL MEDICINE
Payer: COMMERCIAL

## 2019-02-19 ENCOUNTER — APPOINTMENT (OUTPATIENT)
Dept: RADIATION ONCOLOGY | Facility: HOSPITAL | Age: 64
End: 2019-02-19
Attending: STUDENT IN AN ORGANIZED HEALTH CARE EDUCATION/TRAINING PROGRAM
Payer: COMMERCIAL

## 2019-02-19 PROCEDURE — 77412 RADIATION TX DELIVERY LVL 3: CPT | Performed by: STUDENT IN AN ORGANIZED HEALTH CARE EDUCATION/TRAINING PROGRAM

## 2019-02-22 ENCOUNTER — APPOINTMENT (OUTPATIENT)
Dept: RADIATION ONCOLOGY | Facility: HOSPITAL | Age: 64
End: 2019-02-22
Attending: STUDENT IN AN ORGANIZED HEALTH CARE EDUCATION/TRAINING PROGRAM
Payer: COMMERCIAL

## 2019-02-22 PROCEDURE — 77336 RADIATION PHYSICS CONSULT: CPT | Performed by: STUDENT IN AN ORGANIZED HEALTH CARE EDUCATION/TRAINING PROGRAM

## 2019-02-22 PROCEDURE — 77417 THER RADIOLOGY PORT IMAGE(S): CPT | Performed by: STUDENT IN AN ORGANIZED HEALTH CARE EDUCATION/TRAINING PROGRAM

## 2019-02-22 PROCEDURE — 77412 RADIATION TX DELIVERY LVL 3: CPT | Performed by: STUDENT IN AN ORGANIZED HEALTH CARE EDUCATION/TRAINING PROGRAM

## 2019-02-25 ENCOUNTER — APPOINTMENT (OUTPATIENT)
Dept: RADIATION ONCOLOGY | Facility: HOSPITAL | Age: 64
End: 2019-02-25
Payer: COMMERCIAL

## 2019-02-25 PROCEDURE — 77412 RADIATION TX DELIVERY LVL 3: CPT | Performed by: STUDENT IN AN ORGANIZED HEALTH CARE EDUCATION/TRAINING PROGRAM

## 2019-02-26 ENCOUNTER — APPOINTMENT (OUTPATIENT)
Dept: RADIATION ONCOLOGY | Facility: HOSPITAL | Age: 64
End: 2019-02-26
Attending: STUDENT IN AN ORGANIZED HEALTH CARE EDUCATION/TRAINING PROGRAM
Payer: COMMERCIAL

## 2019-02-26 PROCEDURE — 77412 RADIATION TX DELIVERY LVL 3: CPT | Performed by: STUDENT IN AN ORGANIZED HEALTH CARE EDUCATION/TRAINING PROGRAM

## 2019-02-27 ENCOUNTER — APPOINTMENT (OUTPATIENT)
Dept: RADIATION ONCOLOGY | Facility: HOSPITAL | Age: 64
End: 2019-02-27
Attending: RADIOLOGY
Payer: COMMERCIAL

## 2019-02-27 PROCEDURE — 77412 RADIATION TX DELIVERY LVL 3: CPT | Performed by: STUDENT IN AN ORGANIZED HEALTH CARE EDUCATION/TRAINING PROGRAM

## 2019-02-28 ENCOUNTER — TELEPHONE (OUTPATIENT)
Dept: SURGICAL ONCOLOGY | Facility: CLINIC | Age: 64
End: 2019-02-28

## 2019-02-28 ENCOUNTER — APPOINTMENT (OUTPATIENT)
Dept: RADIATION ONCOLOGY | Facility: HOSPITAL | Age: 64
End: 2019-02-28
Attending: RADIOLOGY
Payer: COMMERCIAL

## 2019-02-28 PROCEDURE — 77412 RADIATION TX DELIVERY LVL 3: CPT | Performed by: RADIOLOGY

## 2019-02-28 NOTE — TELEPHONE ENCOUNTER
Breast Nurse Navigator    Called patient, no answer, left voicemail message to return call to discuss how she is doing since radiation started and to assist with any questions, concerns or needs she may have at this time  Left contact information and availability  Awaiting return call

## 2019-03-01 ENCOUNTER — HOSPITAL ENCOUNTER (OUTPATIENT)
Dept: BONE DENSITY | Facility: CLINIC | Age: 64
Discharge: HOME | End: 2019-03-01
Payer: COMMERCIAL

## 2019-03-01 ENCOUNTER — APPOINTMENT (OUTPATIENT)
Dept: LAB | Facility: HOSPITAL | Age: 64
End: 2019-03-01
Payer: COMMERCIAL

## 2019-03-01 DIAGNOSIS — Z17.0 MALIGNANT NEOPLASM OF UPPER-OUTER QUADRANT OF RIGHT BREAST IN FEMALE, ESTROGEN RECEPTOR POSITIVE (HCC): ICD-10-CM

## 2019-03-01 DIAGNOSIS — C50.411 MALIGNANT NEOPLASM OF UPPER-OUTER QUADRANT OF RIGHT BREAST IN FEMALE, ESTROGEN RECEPTOR POSITIVE (HCC): ICD-10-CM

## 2019-03-01 DIAGNOSIS — C50.919 MALIGNANT NEOPLASM OF FEMALE BREAST, UNSPECIFIED ESTROGEN RECEPTOR STATUS, UNSPECIFIED LATERALITY, UNSPECIFIED SITE OF BREAST (HCC): ICD-10-CM

## 2019-03-01 LAB
BASOPHILS # BLD AUTO: 0 THOUSANDS/ΜL (ref 0–0.1)
BASOPHILS NFR BLD AUTO: 0 % (ref 0–1)
EOSINOPHIL # BLD AUTO: 0.2 THOUSAND/ΜL (ref 0–0.4)
EOSINOPHIL NFR BLD AUTO: 3 % (ref 0–6)
ERYTHROCYTE [DISTWIDTH] IN BLOOD BY AUTOMATED COUNT: 13.9 %
HCT VFR BLD AUTO: 37.4 % (ref 36–46)
HGB BLD-MCNC: 12.2 G/DL (ref 12–16)
LYMPHOCYTES # BLD AUTO: 2.3 THOUSANDS/ΜL (ref 0.5–4)
LYMPHOCYTES NFR BLD AUTO: 29 % (ref 25–45)
MCH RBC QN AUTO: 28.5 PG (ref 26–34)
MCHC RBC AUTO-ENTMCNC: 32.7 G/DL (ref 31–36)
MCV RBC AUTO: 87 FL (ref 80–100)
MONOCYTES # BLD AUTO: 0.5 THOUSAND/ΜL (ref 0.2–0.9)
MONOCYTES NFR BLD AUTO: 7 % (ref 1–10)
NEUTROPHILS # BLD AUTO: 4.9 THOUSANDS/ΜL (ref 1.8–7.8)
NEUTS SEG NFR BLD AUTO: 62 % (ref 45–65)
PLATELET # BLD AUTO: 280 THOUSANDS/UL (ref 150–450)
PMV BLD AUTO: 9.3 FL (ref 8.9–12.7)
RBC # BLD AUTO: 4.3 MILLION/UL (ref 4–5.2)
WBC # BLD AUTO: 8 THOUSAND/UL (ref 4.5–11)

## 2019-03-01 PROCEDURE — 77080 DXA BONE DENSITY AXIAL: CPT

## 2019-03-01 PROCEDURE — 36415 COLL VENOUS BLD VENIPUNCTURE: CPT

## 2019-03-01 PROCEDURE — 85025 COMPLETE CBC W/AUTO DIFF WBC: CPT

## 2019-03-04 ENCOUNTER — APPOINTMENT (OUTPATIENT)
Dept: RADIATION ONCOLOGY | Facility: HOSPITAL | Age: 64
End: 2019-03-04
Attending: RADIOLOGY
Payer: COMMERCIAL

## 2019-03-04 PROCEDURE — 77417 THER RADIOLOGY PORT IMAGE(S): CPT | Performed by: RADIOLOGY

## 2019-03-04 PROCEDURE — 77336 RADIATION PHYSICS CONSULT: CPT | Performed by: RADIOLOGY

## 2019-03-04 PROCEDURE — 77412 RADIATION TX DELIVERY LVL 3: CPT | Performed by: RADIOLOGY

## 2019-03-05 ENCOUNTER — APPOINTMENT (OUTPATIENT)
Dept: RADIATION ONCOLOGY | Facility: HOSPITAL | Age: 64
End: 2019-03-05
Attending: RADIOLOGY
Payer: COMMERCIAL

## 2019-03-05 PROCEDURE — 77412 RADIATION TX DELIVERY LVL 3: CPT | Performed by: RADIOLOGY

## 2019-03-06 ENCOUNTER — APPOINTMENT (OUTPATIENT)
Dept: RADIATION ONCOLOGY | Facility: HOSPITAL | Age: 64
End: 2019-03-06
Attending: RADIOLOGY
Payer: COMMERCIAL

## 2019-03-06 PROCEDURE — 77412 RADIATION TX DELIVERY LVL 3: CPT | Performed by: RADIOLOGY

## 2019-03-07 ENCOUNTER — APPOINTMENT (OUTPATIENT)
Dept: RADIATION ONCOLOGY | Facility: HOSPITAL | Age: 64
End: 2019-03-07
Attending: RADIOLOGY
Payer: COMMERCIAL

## 2019-03-07 PROCEDURE — 77412 RADIATION TX DELIVERY LVL 3: CPT | Performed by: RADIOLOGY

## 2019-03-11 ENCOUNTER — APPOINTMENT (OUTPATIENT)
Dept: RADIATION ONCOLOGY | Facility: HOSPITAL | Age: 64
End: 2019-03-11
Attending: RADIOLOGY
Payer: COMMERCIAL

## 2019-03-11 PROCEDURE — 77412 RADIATION TX DELIVERY LVL 3: CPT | Performed by: RADIOLOGY

## 2019-03-12 ENCOUNTER — APPOINTMENT (OUTPATIENT)
Dept: RADIATION ONCOLOGY | Facility: HOSPITAL | Age: 64
End: 2019-03-12
Attending: RADIOLOGY
Payer: COMMERCIAL

## 2019-03-12 PROCEDURE — 77336 RADIATION PHYSICS CONSULT: CPT | Performed by: RADIOLOGY

## 2019-03-12 PROCEDURE — 77412 RADIATION TX DELIVERY LVL 3: CPT | Performed by: RADIOLOGY

## 2019-03-13 ENCOUNTER — APPOINTMENT (OUTPATIENT)
Dept: RADIATION ONCOLOGY | Facility: HOSPITAL | Age: 64
End: 2019-03-13
Attending: RADIOLOGY
Payer: COMMERCIAL

## 2019-03-13 PROCEDURE — 77412 RADIATION TX DELIVERY LVL 3: CPT | Performed by: RADIOLOGY

## 2019-03-14 ENCOUNTER — APPOINTMENT (OUTPATIENT)
Dept: RADIATION ONCOLOGY | Facility: HOSPITAL | Age: 64
End: 2019-03-14
Attending: RADIOLOGY
Payer: COMMERCIAL

## 2019-03-14 PROCEDURE — 77412 RADIATION TX DELIVERY LVL 3: CPT | Performed by: RADIOLOGY

## 2019-03-15 ENCOUNTER — APPOINTMENT (OUTPATIENT)
Dept: RADIATION ONCOLOGY | Facility: HOSPITAL | Age: 64
End: 2019-03-15
Attending: RADIOLOGY
Payer: COMMERCIAL

## 2019-03-15 PROCEDURE — 77412 RADIATION TX DELIVERY LVL 3: CPT | Performed by: RADIOLOGY

## 2019-03-18 ENCOUNTER — APPOINTMENT (OUTPATIENT)
Dept: RADIATION ONCOLOGY | Facility: HOSPITAL | Age: 64
End: 2019-03-18
Attending: RADIOLOGY
Payer: COMMERCIAL

## 2019-03-18 PROCEDURE — 77412 RADIATION TX DELIVERY LVL 3: CPT | Performed by: RADIOLOGY

## 2019-03-19 ENCOUNTER — APPOINTMENT (OUTPATIENT)
Dept: RADIATION ONCOLOGY | Facility: HOSPITAL | Age: 64
End: 2019-03-19
Attending: RADIOLOGY
Payer: COMMERCIAL

## 2019-03-19 PROCEDURE — 77336 RADIATION PHYSICS CONSULT: CPT | Performed by: RADIOLOGY

## 2019-03-19 PROCEDURE — 77412 RADIATION TX DELIVERY LVL 3: CPT | Performed by: RADIOLOGY

## 2019-03-20 ENCOUNTER — APPOINTMENT (OUTPATIENT)
Dept: RADIATION ONCOLOGY | Facility: HOSPITAL | Age: 64
End: 2019-03-20
Attending: RADIOLOGY
Payer: COMMERCIAL

## 2019-03-20 PROCEDURE — 77412 RADIATION TX DELIVERY LVL 3: CPT | Performed by: RADIOLOGY

## 2019-03-20 PROCEDURE — 77417 THER RADIOLOGY PORT IMAGE(S): CPT | Performed by: RADIOLOGY

## 2019-03-21 PROCEDURE — 77412 RADIATION TX DELIVERY LVL 3: CPT | Performed by: RADIOLOGY

## 2019-03-22 ENCOUNTER — APPOINTMENT (OUTPATIENT)
Dept: RADIATION ONCOLOGY | Facility: CLINIC | Age: 64
End: 2019-03-22
Attending: RADIOLOGY
Payer: COMMERCIAL

## 2019-03-22 PROCEDURE — 77412 RADIATION TX DELIVERY LVL 3: CPT | Performed by: RADIOLOGY

## 2019-03-22 PROCEDURE — 77280 THER RAD SIMULAJ FIELD SMPL: CPT | Performed by: RADIOLOGY

## 2019-03-22 PROCEDURE — 77334 RADIATION TREATMENT AID(S): CPT | Performed by: RADIOLOGY

## 2019-03-25 ENCOUNTER — APPOINTMENT (OUTPATIENT)
Dept: RADIATION ONCOLOGY | Facility: HOSPITAL | Age: 64
End: 2019-03-25
Attending: RADIOLOGY
Payer: COMMERCIAL

## 2019-03-25 PROCEDURE — 77412 RADIATION TX DELIVERY LVL 3: CPT | Performed by: RADIOLOGY

## 2019-03-26 ENCOUNTER — APPOINTMENT (OUTPATIENT)
Dept: RADIATION ONCOLOGY | Facility: HOSPITAL | Age: 64
End: 2019-03-26
Attending: RADIOLOGY
Payer: COMMERCIAL

## 2019-03-26 PROCEDURE — 77412 RADIATION TX DELIVERY LVL 3: CPT | Performed by: RADIOLOGY

## 2019-03-26 PROCEDURE — 77300 RADIATION THERAPY DOSE PLAN: CPT | Performed by: RADIOLOGY

## 2019-03-26 PROCEDURE — 77295 3-D RADIOTHERAPY PLAN: CPT | Performed by: RADIOLOGY

## 2019-03-26 PROCEDURE — 77334 RADIATION TREATMENT AID(S): CPT | Performed by: RADIOLOGY

## 2019-03-27 ENCOUNTER — APPOINTMENT (OUTPATIENT)
Dept: RADIATION ONCOLOGY | Facility: HOSPITAL | Age: 64
End: 2019-03-27
Attending: RADIOLOGY
Payer: COMMERCIAL

## 2019-03-27 PROCEDURE — 77331 SPECIAL RADIATION DOSIMETRY: CPT | Performed by: RADIOLOGY

## 2019-03-27 PROCEDURE — 77412 RADIATION TX DELIVERY LVL 3: CPT | Performed by: RADIOLOGY

## 2019-03-27 PROCEDURE — 77280 THER RAD SIMULAJ FIELD SMPL: CPT | Performed by: RADIOLOGY

## 2019-03-27 PROCEDURE — 77295 3-D RADIOTHERAPY PLAN: CPT | Performed by: RADIOLOGY

## 2019-03-27 PROCEDURE — 77334 RADIATION TREATMENT AID(S): CPT | Performed by: RADIOLOGY

## 2019-03-27 PROCEDURE — 77300 RADIATION THERAPY DOSE PLAN: CPT | Performed by: RADIOLOGY

## 2019-03-27 PROCEDURE — 77336 RADIATION PHYSICS CONSULT: CPT | Performed by: RADIOLOGY

## 2019-03-28 ENCOUNTER — APPOINTMENT (OUTPATIENT)
Dept: RADIATION ONCOLOGY | Facility: HOSPITAL | Age: 64
End: 2019-03-28
Attending: RADIOLOGY
Payer: COMMERCIAL

## 2019-03-29 ENCOUNTER — APPOINTMENT (OUTPATIENT)
Dept: RADIATION ONCOLOGY | Facility: HOSPITAL | Age: 64
End: 2019-03-29
Attending: RADIOLOGY
Payer: COMMERCIAL

## 2019-03-29 DIAGNOSIS — E55.9 VITAMIN D DEFICIENCY: ICD-10-CM

## 2019-03-29 PROCEDURE — 77412 RADIATION TX DELIVERY LVL 3: CPT | Performed by: RADIOLOGY

## 2019-03-29 RX ORDER — ERGOCALCIFEROL 1.25 MG/1
50000 CAPSULE ORAL WEEKLY
Qty: 4 CAPSULE | Refills: 0 | Status: SHIPPED | OUTPATIENT
Start: 2019-03-29 | End: 2019-04-26 | Stop reason: SDUPTHER

## 2019-04-01 ENCOUNTER — APPOINTMENT (OUTPATIENT)
Dept: RADIATION ONCOLOGY | Facility: HOSPITAL | Age: 64
End: 2019-04-01
Attending: RADIOLOGY
Payer: COMMERCIAL

## 2019-04-01 PROCEDURE — 77412 RADIATION TX DELIVERY LVL 3: CPT | Performed by: RADIOLOGY

## 2019-04-02 ENCOUNTER — APPOINTMENT (OUTPATIENT)
Dept: RADIATION ONCOLOGY | Facility: HOSPITAL | Age: 64
End: 2019-04-02
Attending: RADIOLOGY
Payer: COMMERCIAL

## 2019-04-02 DIAGNOSIS — I10 BENIGN ESSENTIAL HYPERTENSION: ICD-10-CM

## 2019-04-02 DIAGNOSIS — E78.2 MIXED HYPERLIPIDEMIA: ICD-10-CM

## 2019-04-02 PROCEDURE — 77412 RADIATION TX DELIVERY LVL 3: CPT | Performed by: RADIOLOGY

## 2019-04-03 ENCOUNTER — APPOINTMENT (OUTPATIENT)
Dept: RADIATION ONCOLOGY | Facility: HOSPITAL | Age: 64
End: 2019-04-03
Attending: RADIOLOGY
Payer: COMMERCIAL

## 2019-04-03 PROCEDURE — 77412 RADIATION TX DELIVERY LVL 3: CPT | Performed by: STUDENT IN AN ORGANIZED HEALTH CARE EDUCATION/TRAINING PROGRAM

## 2019-04-03 RX ORDER — LOSARTAN POTASSIUM 50 MG/1
50 TABLET ORAL DAILY
Qty: 30 TABLET | Refills: 2 | Status: SHIPPED | OUTPATIENT
Start: 2019-04-03 | End: 2019-08-16 | Stop reason: SDUPTHER

## 2019-04-03 RX ORDER — ATORVASTATIN CALCIUM 80 MG/1
80 TABLET, FILM COATED ORAL EVERY 24 HOURS
Qty: 30 TABLET | Refills: 2 | Status: SHIPPED | OUTPATIENT
Start: 2019-04-03 | End: 2019-08-16

## 2019-04-08 PROCEDURE — 77336 RADIATION PHYSICS CONSULT: CPT | Performed by: RADIOLOGY

## 2019-04-24 ENCOUNTER — TELEPHONE (OUTPATIENT)
Dept: HEMATOLOGY ONCOLOGY | Facility: CLINIC | Age: 64
End: 2019-04-24

## 2019-04-26 DIAGNOSIS — I10 BENIGN ESSENTIAL HYPERTENSION: Primary | ICD-10-CM

## 2019-04-26 DIAGNOSIS — E55.9 VITAMIN D DEFICIENCY: ICD-10-CM

## 2019-04-26 RX ORDER — ASPIRIN 81 MG
TABLET, DELAYED RELEASE (ENTERIC COATED) ORAL
Qty: 30 TABLET | Refills: 0 | Status: SHIPPED | OUTPATIENT
Start: 2019-04-26 | End: 2019-05-23 | Stop reason: ALTCHOICE

## 2019-04-26 RX ORDER — ERGOCALCIFEROL 1.25 MG/1
50000 CAPSULE ORAL WEEKLY
Qty: 4 CAPSULE | Refills: 0 | Status: SHIPPED | OUTPATIENT
Start: 2019-04-26 | End: 2019-08-16 | Stop reason: SDUPTHER

## 2019-05-16 ENCOUNTER — TELEPHONE (OUTPATIENT)
Dept: SURGICAL ONCOLOGY | Facility: CLINIC | Age: 64
End: 2019-05-16

## 2019-05-22 ENCOUNTER — TELEPHONE (OUTPATIENT)
Dept: HEMATOLOGY ONCOLOGY | Facility: CLINIC | Age: 64
End: 2019-05-22

## 2019-05-23 ENCOUNTER — APPOINTMENT (OUTPATIENT)
Dept: LAB | Facility: HOSPITAL | Age: 64
End: 2019-05-23
Attending: INTERNAL MEDICINE
Payer: COMMERCIAL

## 2019-05-23 ENCOUNTER — APPOINTMENT (OUTPATIENT)
Dept: LAB | Facility: HOSPITAL | Age: 64
End: 2019-05-23
Payer: COMMERCIAL

## 2019-05-23 ENCOUNTER — OFFICE VISIT (OUTPATIENT)
Dept: HEMATOLOGY ONCOLOGY | Facility: CLINIC | Age: 64
End: 2019-05-23
Payer: COMMERCIAL

## 2019-05-23 VITALS
HEIGHT: 66 IN | WEIGHT: 268.6 LBS | DIASTOLIC BLOOD PRESSURE: 82 MMHG | TEMPERATURE: 98.6 F | OXYGEN SATURATION: 97 % | RESPIRATION RATE: 18 BRPM | HEART RATE: 75 BPM | BODY MASS INDEX: 43.17 KG/M2 | SYSTOLIC BLOOD PRESSURE: 154 MMHG

## 2019-05-23 DIAGNOSIS — C50.411 MALIGNANT NEOPLASM OF UPPER-OUTER QUADRANT OF RIGHT BREAST IN FEMALE, ESTROGEN RECEPTOR POSITIVE (HCC): Primary | ICD-10-CM

## 2019-05-23 DIAGNOSIS — Z86.2 HISTORY OF ANEMIA: ICD-10-CM

## 2019-05-23 DIAGNOSIS — Z17.0 MALIGNANT NEOPLASM OF UPPER-OUTER QUADRANT OF RIGHT BREAST IN FEMALE, ESTROGEN RECEPTOR POSITIVE (HCC): Primary | ICD-10-CM

## 2019-05-23 DIAGNOSIS — C50.411 MALIGNANT NEOPLASM OF UPPER-OUTER QUADRANT OF RIGHT BREAST IN FEMALE, ESTROGEN RECEPTOR POSITIVE (HCC): ICD-10-CM

## 2019-05-23 DIAGNOSIS — Z17.0 MALIGNANT NEOPLASM OF UPPER-OUTER QUADRANT OF RIGHT BREAST IN FEMALE, ESTROGEN RECEPTOR POSITIVE (HCC): ICD-10-CM

## 2019-05-23 DIAGNOSIS — E78.2 MIXED HYPERLIPIDEMIA: ICD-10-CM

## 2019-05-23 DIAGNOSIS — E55.9 VITAMIN D DEFICIENCY: ICD-10-CM

## 2019-05-23 DIAGNOSIS — I10 BENIGN ESSENTIAL HYPERTENSION: ICD-10-CM

## 2019-05-23 LAB
25(OH)D3 SERPL-MCNC: 51.8 NG/ML (ref 30–100)
ALBUMIN SERPL BCP-MCNC: 3.9 G/DL (ref 3–5.2)
ALP SERPL-CCNC: 104 U/L (ref 43–122)
ALT SERPL W P-5'-P-CCNC: 20 U/L (ref 9–52)
ANION GAP SERPL CALCULATED.3IONS-SCNC: 9 MMOL/L (ref 5–14)
AST SERPL W P-5'-P-CCNC: 24 U/L (ref 14–36)
BACTERIA UR QL AUTO: ABNORMAL /HPF
BASOPHILS # BLD AUTO: 0 THOUSANDS/ΜL (ref 0–0.1)
BASOPHILS NFR BLD AUTO: 1 % (ref 0–1)
BILIRUB SERPL-MCNC: 0.3 MG/DL
BILIRUB UR QL STRIP: NEGATIVE
BUN SERPL-MCNC: 17 MG/DL (ref 5–25)
CALCIUM SERPL-MCNC: 9.3 MG/DL (ref 8.4–10.2)
CHLORIDE SERPL-SCNC: 104 MMOL/L (ref 97–108)
CHOLEST SERPL-MCNC: 146 MG/DL
CLARITY UR: CLEAR
CO2 SERPL-SCNC: 27 MMOL/L (ref 22–30)
COLOR UR: YELLOW
CREAT SERPL-MCNC: 0.69 MG/DL (ref 0.6–1.2)
EOSINOPHIL # BLD AUTO: 0.2 THOUSAND/ΜL (ref 0–0.4)
EOSINOPHIL NFR BLD AUTO: 3 % (ref 0–6)
ERYTHROCYTE [DISTWIDTH] IN BLOOD BY AUTOMATED COUNT: 13.9 %
FOLATE SERPL-MCNC: >20 NG/ML (ref 3.1–17.5)
GFR SERPL CREATININE-BSD FRML MDRD: 107 ML/MIN/1.73SQ M
GLUCOSE SERPL-MCNC: 100 MG/DL (ref 70–99)
GLUCOSE UR STRIP-MCNC: NEGATIVE MG/DL
HCT VFR BLD AUTO: 35.1 % (ref 36–46)
HDLC SERPL-MCNC: 49 MG/DL (ref 40–59)
HGB BLD-MCNC: 11.8 G/DL (ref 12–16)
HGB UR QL STRIP.AUTO: 25
KETONES UR STRIP-MCNC: NEGATIVE MG/DL
LDLC SERPL CALC-MCNC: 83 MG/DL
LEUKOCYTE ESTERASE UR QL STRIP: NEGATIVE
LYMPHOCYTES # BLD AUTO: 1.9 THOUSANDS/ΜL (ref 0.5–4)
LYMPHOCYTES NFR BLD AUTO: 26 % (ref 25–45)
MCH RBC QN AUTO: 28.8 PG (ref 26–34)
MCHC RBC AUTO-ENTMCNC: 33.6 G/DL (ref 31–36)
MCV RBC AUTO: 86 FL (ref 80–100)
MONOCYTES # BLD AUTO: 0.5 THOUSAND/ΜL (ref 0.2–0.9)
MONOCYTES NFR BLD AUTO: 6 % (ref 1–10)
MUCOUS THREADS UR QL AUTO: ABNORMAL
NEUTROPHILS # BLD AUTO: 4.5 THOUSANDS/ΜL (ref 1.8–7.8)
NEUTS SEG NFR BLD AUTO: 64 % (ref 45–65)
NITRITE UR QL STRIP: NEGATIVE
NON-SQ EPI CELLS URNS QL MICRO: ABNORMAL /HPF
NONHDLC SERPL-MCNC: 97 MG/DL
PH UR STRIP.AUTO: 5 [PH]
PLATELET # BLD AUTO: 269 THOUSANDS/UL (ref 150–450)
PMV BLD AUTO: 8.6 FL (ref 8.9–12.7)
POTASSIUM SERPL-SCNC: 3.8 MMOL/L (ref 3.6–5)
PROT SERPL-MCNC: 7.6 G/DL (ref 5.9–8.4)
PROT UR STRIP-MCNC: NEGATIVE MG/DL
RBC # BLD AUTO: 4.09 MILLION/UL (ref 4–5.2)
RBC #/AREA URNS AUTO: ABNORMAL /HPF
SODIUM SERPL-SCNC: 140 MMOL/L (ref 137–147)
SP GR UR STRIP.AUTO: 1.02 (ref 1–1.04)
TRIGL SERPL-MCNC: 70 MG/DL
UROBILINOGEN UA: NEGATIVE MG/DL
VIT B12 SERPL-MCNC: 234 PG/ML (ref 100–900)
WBC # BLD AUTO: 7.1 THOUSAND/UL (ref 4.5–11)
WBC #/AREA URNS AUTO: ABNORMAL /HPF

## 2019-05-23 PROCEDURE — 36415 COLL VENOUS BLD VENIPUNCTURE: CPT | Performed by: INTERNAL MEDICINE

## 2019-05-23 PROCEDURE — 82746 ASSAY OF FOLIC ACID SERUM: CPT

## 2019-05-23 PROCEDURE — 82607 VITAMIN B-12: CPT

## 2019-05-23 PROCEDURE — 81001 URINALYSIS AUTO W/SCOPE: CPT

## 2019-05-23 PROCEDURE — 85025 COMPLETE CBC W/AUTO DIFF WBC: CPT

## 2019-05-23 PROCEDURE — 82306 VITAMIN D 25 HYDROXY: CPT | Performed by: INTERNAL MEDICINE

## 2019-05-23 PROCEDURE — 80061 LIPID PANEL: CPT

## 2019-05-23 PROCEDURE — 99213 OFFICE O/P EST LOW 20 MIN: CPT | Performed by: INTERNAL MEDICINE

## 2019-05-23 PROCEDURE — 81003 URINALYSIS AUTO W/O SCOPE: CPT

## 2019-05-23 PROCEDURE — 80053 COMPREHEN METABOLIC PANEL: CPT

## 2019-05-23 RX ORDER — ANASTROZOLE 1 MG/1
1 TABLET ORAL DAILY
Qty: 90 TABLET | Refills: 3 | Status: SHIPPED | OUTPATIENT
Start: 2019-05-23 | End: 2019-08-16 | Stop reason: SDUPTHER

## 2019-08-16 ENCOUNTER — OFFICE VISIT (OUTPATIENT)
Dept: FAMILY MEDICINE CLINIC | Facility: CLINIC | Age: 64
End: 2019-08-16

## 2019-08-16 VITALS
WEIGHT: 272.4 LBS | BODY MASS INDEX: 43.78 KG/M2 | HEIGHT: 66 IN | RESPIRATION RATE: 18 BRPM | HEART RATE: 98 BPM | OXYGEN SATURATION: 97 % | TEMPERATURE: 97.6 F | SYSTOLIC BLOOD PRESSURE: 158 MMHG | DIASTOLIC BLOOD PRESSURE: 96 MMHG

## 2019-08-16 DIAGNOSIS — Z86.2 HISTORY OF ANEMIA: ICD-10-CM

## 2019-08-16 DIAGNOSIS — Z17.0 MALIGNANT NEOPLASM OF UPPER-OUTER QUADRANT OF RIGHT BREAST IN FEMALE, ESTROGEN RECEPTOR POSITIVE (HCC): ICD-10-CM

## 2019-08-16 DIAGNOSIS — E78.2 MIXED HYPERLIPIDEMIA: ICD-10-CM

## 2019-08-16 DIAGNOSIS — J30.0 VASOMOTOR RHINITIS: ICD-10-CM

## 2019-08-16 DIAGNOSIS — I10 BENIGN ESSENTIAL HYPERTENSION: Primary | ICD-10-CM

## 2019-08-16 DIAGNOSIS — Z12.11 SCREENING FOR COLON CANCER: ICD-10-CM

## 2019-08-16 DIAGNOSIS — H04.201 EXCESSIVE TEAR PRODUCTION OF RIGHT LACRIMAL GLAND: ICD-10-CM

## 2019-08-16 DIAGNOSIS — E55.9 VITAMIN D DEFICIENCY: ICD-10-CM

## 2019-08-16 DIAGNOSIS — C50.411 MALIGNANT NEOPLASM OF UPPER-OUTER QUADRANT OF RIGHT BREAST IN FEMALE, ESTROGEN RECEPTOR POSITIVE (HCC): ICD-10-CM

## 2019-08-16 PROCEDURE — 3008F BODY MASS INDEX DOCD: CPT | Performed by: FAMILY MEDICINE

## 2019-08-16 PROCEDURE — 99213 OFFICE O/P EST LOW 20 MIN: CPT | Performed by: FAMILY MEDICINE

## 2019-08-16 RX ORDER — LOSARTAN POTASSIUM 50 MG/1
50 TABLET ORAL DAILY
Qty: 90 TABLET | Refills: 1 | Status: SHIPPED | OUTPATIENT
Start: 2019-08-16 | End: 2020-06-09 | Stop reason: SDUPTHER

## 2019-08-16 RX ORDER — ATORVASTATIN CALCIUM 40 MG/1
40 TABLET, FILM COATED ORAL
Qty: 90 TABLET | Refills: 1 | Status: SHIPPED | OUTPATIENT
Start: 2019-08-16 | End: 2020-07-05

## 2019-08-16 RX ORDER — FLUTICASONE PROPIONATE 50 MCG
2 SPRAY, SUSPENSION (ML) NASAL DAILY
Qty: 16 G | Refills: 2 | Status: SHIPPED | OUTPATIENT
Start: 2019-08-16 | End: 2022-07-07 | Stop reason: SDUPTHER

## 2019-08-16 RX ORDER — FOLIC ACID 1 MG/1
1 TABLET ORAL EVERY 24 HOURS
Qty: 90 TABLET | Refills: 1 | Status: SHIPPED | OUTPATIENT
Start: 2019-08-16 | End: 2020-07-05

## 2019-08-16 RX ORDER — ERGOCALCIFEROL 1.25 MG/1
50000 CAPSULE ORAL WEEKLY
Qty: 4 CAPSULE | Refills: 2 | Status: SHIPPED | OUTPATIENT
Start: 2019-08-16 | End: 2022-07-07 | Stop reason: SDUPTHER

## 2019-08-16 RX ORDER — LANOLIN ALCOHOL/MO/W.PET/CERES
1000 CREAM (GRAM) TOPICAL DAILY
Qty: 90 TABLET | Refills: 1 | Status: SHIPPED | OUTPATIENT
Start: 2019-08-16 | End: 2021-09-22 | Stop reason: SDUPTHER

## 2019-08-16 NOTE — PATIENT INSTRUCTIONS
DASH Eating Plan   AMBULATORY CARE:   The DASH (Dietary Approaches to Stop Hypertension) Eating Plan  is designed to help prevent or lower high blood pressure  It can also help to lower LDL (bad) cholesterol and decrease your risk for heart disease  The plan is low in sodium, sugar, unhealthy fats, and total fat  It is high in potassium, calcium, magnesium, and fiber  These nutrients are added when you eat more fruits, vegetables, and whole grains  Your sodium limit each day: Your dietitian will tell you how much sodium is safe for you to have each day  People with high blood pressure should have no more than 1,500 to 2,300 mg of sodium in a day  A teaspoon (tsp) of salt has 2,300 mg of sodium  This may seem like a difficult goal, but small changes to the foods you eat can make a big difference  Your healthcare provider or dietitian can help you create a meal plan that follows your sodium limit  How to limit sodium:   · Read food labels  Food labels can help you choose foods that are low in sodium  The amount of sodium is listed in milligrams (mg)  The % Daily Value (DV) column tells you how much of your daily needs are met by 1 serving of the food for each nutrient listed  Choose foods that have less than 5% of the DV of sodium  These foods are considered low in sodium  Foods that have 20% or more of the DV of sodium are considered high in sodium  Avoid foods that have more than 300 mg of sodium in each serving  Choose foods that say low-sodium, reduced-sodium, or no salt added on the food label  · Avoid salt  Do not salt food at the table, and add very little salt to foods during cooking  Use herbs and spices, such as onions, garlic, and salt-free seasonings to add flavor to foods  Try lemon or lime juice or vinegar to give foods a tart flavor  Use hot peppers or a small amount of hot pepper sauce to add a spicy flavor to foods  · Ask about salt substitutes    Ask your healthcare provider if you may use salt substitutes  Some salt substitutes have ingredients that can be harmful if you have certain health conditions  · Choose foods carefully at restaurants  Meals from restaurants, especially fast food restaurants, are often high in sodium  Some restaurants have nutrition information that tells you the amount of sodium in their foods  Ask to have your food prepared with less, or no salt  What you need to know about fats:   · Include healthy fats  Examples are unsaturated fats and omega-3 fatty acids  Unsaturated fats are found in soybean, canola, olive, or sunflower oil, and liquid and soft tub margarines  Omega-3 fatty acids are found in fatty fish, such as salmon, tuna, mackerel, and sardines  It is also found in flaxseed oil and ground flaxseed  · Avoid unhealthy fats  Do not eat unhealthy fats, such as saturated fats and trans fats  Saturated fats are found in foods that contain fat from animals  Examples are fatty meats, whole milk, butter, cream, and other dairy foods  It is also found in shortening, stick margarine, palm oil, and coconut oil  Trans fats are found in fried foods, crackers, chips, and baked goods made with margarine or shortening  Foods to include: With the DASH eating plan, you need to eat a certain number of servings from each food group  This will help you get enough of certain nutrients and limit others  The amount of servings you should eat depends on how many calories you need  Your dietitian can tell you how many calories you need  The number of servings listed next to the food groups below are for people who need about 2,000 calories each day    · Grains:  6 to 8 servings (3 of these servings should be whole-grain foods)    ¨ 1 slice of whole-grain bread     ¨ 1 ounce of dry cereal    ¨ ½ cup of cooked cereal, pasta, or brown rice    · Vegetables and fruits:  4 to 5 servings of fruits and 4 to 5 servings of vegetables    ¨ 1 medium fruit    ¨ ½ cup of frozen, canned (no added salt), or chopped fresh vegetables     ¨ ½ cup of fresh, frozen, dried, or canned fruit (canned in light syrup or fruit juice)    ¨ ½ cup of vegetable or fruit juice    · Dairy:  2 to 3 servings    ¨ 1 cup of nonfat (skim) or 1% milk    ¨ 1½ ounces of fat-free or low-fat cheese    ¨ 6 ounces of nonfat or low-fat yogurt    · Lean meat, poultry, and fish:  6 ounces or less    [employer] (chicken, turkey) with no skin    ¨ Fish (especially fatty fish, such as salmon, fresh tuna, or mackerel)    ¨ Lean beef and pork (loin, round, extra lean hamburger)    ¨ Egg whites and egg substitutes    · Nuts, seeds, and legumes:  4 to 5 servings each week    ¨ ½ cup of cooked beans and peas    ¨ 1½ ounces of unsalted nuts    ¨ 2 tablespoons of peanut butter or seeds    · Sweets and added sugars:  5 or less each week    ¨ 1 tablespoon of sugar, jelly, or jam    ¨ ½ cup of sorbet or gelatin    ¨ 1 cup of lemonade    · Fats:  2 to 3 servings each week    ¨ 1 teaspoon of soft margarine or vegetable oil    ¨ 1 tablespoon of mayonnaise    ¨ 2 tablespoons of salad dressing  Foods to avoid:   · Grains:      [employer], such as doughnuts, pastries, cookies, and biscuits (high in fat and sugar)    ¨ Mixes for cornbread and biscuits, packaged foods, such as bread stuffing, rice and pasta mixes, macaroni and cheese, and instant cereals (high in sodium)    · Fruits and vegetables:      ¨ Regular, canned vegetables (high in sodium)    ¨ Sauerkraut, pickled vegetables, and other foods prepared in brine (high in sodium)    ¨ Fried vegetables or vegetables in butter or high-fat sauces    ¨ Fruit in cream or butter sauce (high in fat)    · Dairy:      ¨ Whole milk, 2% milk, and cream (high in fat)    ¨ Regular cheese and processed cheese (high in fat and sodium)    · Meats and protein foods:      ¨ Smoked or cured meat, such as corned beef, spears, ham, hot dogs, and sausage (high in fat and sodium)    ¨ Canned beans and canned meats or spreads, such as potted meats, sardines, anchovies, and imitation seafood (high in sodium)    ¨ Deli or lunch meats, such as bologna, ham, turkey, and roast beef (high in sodium)    ¨ High-fat meat (T-bone steak, regular hamburger, and ribs)    ¨ Whole eggs and egg yolks (high in fat)    · Other:      ¨ Seasonings made with salt, such as garlic salt, celery salt, onion salt, seasoned salt, meat tenderizers, and monosodium glutamate (MSG)    ¨ Miso soup and canned or dried soup mixes (high in sodium)    ¨ Regular soy sauce, barbecue sauce, teriyaki sauce, steak sauce, Worcestershire sauce, and most flavored vinegars (high in sodium)    ¨ Regular condiments, such as mustard, ketchup, and salad dressings (high in sodium)    ¨ Gravy and sauces, such as Roland or cheese sauces (high in sodium and fat)    ¨ Drinks high in sugar, such as soda or fruit drinks    [employer] foods, such as salted chips, popcorn, pretzels, pork rinds, salted crackers, and salted nuts    ¨ Frozen foods, such as dinners, entrees, vegetables with sauces, and breaded meats (high in sodium)  Other guidelines to follow:   · Maintain a healthy weight  Your risk for heart disease is higher if you are overweight  Your healthcare provider may suggest that you lose weight if you are overweight  You can lose weight by eating fewer calories and foods that have added sugars and fat  The DASH meal plan can help you do this  Decrease calories by eating smaller portions at each meal and fewer snacks  Ask your healthcare provider for more information about how to lose weight  · Exercise regularly  Regular exercise can help you reach or maintain a healthy weight  Regular exercise can also help decrease your blood pressure and improve your cholesterol levels  Get 30 minutes or more of moderate exercise each day of the week  To lose weight, get at least 60 minutes of exercise  Talk to your healthcare provider about the best exercise program for you      · Limit alcohol  Women should limit alcohol to 1 drink a day  Men should limit alcohol to 2 drinks a day  A drink of alcohol is 12 ounces of beer, 5 ounces of wine, or 1½ ounces of liquor  © 2017 [address] Information is for End User's use only and may not be sold, redistributed or otherwise used for commercial purposes  All illustrations and images included in CareNotes® are the copyrighted property of A D A M [employer] Inc  or David Goodwin  The above information is an  only  It is not intended as medical advice for individual conditions or treatments  Talk to your doctor, nurse or pharmacist before following any medical regimen to see if it is safe and effective for you  Blood Pressure Diary   Measure your blood pressure at about the same time   For best results, sit comfortably with both feet on the floor for at least two minutes before taking a measurement   When you measure your blood pressure, rest your arm on a table so the blood pressure cuff is at about the same height as your heart   Record your blood pressure on this sheet and show it to your doctor at every visit      Date AM PM  Date AM PM

## 2019-08-16 NOTE — PROGRESS NOTES
Chief Complaint   Patient presents with    Hypertension     follow up, as well as needing some medication refills     /96   Pulse 98   Temp 97 6 °F (36 4 °C) (Temporal)   Resp 18   Ht 5' 6" (1 676 m)   Wt 124 kg (272 lb 6 4 oz)   SpO2 97%   Breastfeeding? No   BMI 43 97 kg/m²      Plan and Assessment   1  Benign essential hypertension  losartan (COZAAR) 50 mg tablet   2  Screening for colon cancer  Ambulatory referral to General Surgery   3  Mixed hyperlipidemia  atorvastatin (LIPITOR) 40 mg tablet   4  Vasomotor rhinitis  fluticasone (FLONASE) 50 mcg/act nasal spray   5  History of anemia  folic acid (FOLVITE) 1 mg tablet    cyanocobalamin (VITAMIN B-12) 1,000 mcg tablet   6  Vitamin D deficiency  ergocalciferol (VITAMIN D2) 50,000 units   7  Malignant neoplasm of upper-outer quadrant of right breast in female, estrogen receptor positive (HCC)  cyanocobalamin (VITAMIN B-12) 1,000 mcg tablet   8  Excessive tear production of right lacrimal gland  Ambulatory referral to Ophthalmology     -BP is 158/96  --Goal BP <150/<90 is recommended for patients over 60 without comorbid conditions   -Sub-optimal control - patient is near target and would like to try optimizing lifestyle modification   -Discussed DASH diet, weight loss and exercise  -We agreed to log home BP, follow-up in 1 month for recheck and will check a urine microalbumin:creatinine at that time     -Interested in completing all outstanding screening tests  Referral sent for colonoscopy  Pap smear appointment scheduled  Discussed BMI reduction    -Ophthamology referral for persisting right eye tearing for last few years, worse when she is in cold environment like at work, or air conditioned environment  No signs of infection  Would like to continue using Flonase as she thinks it is helping with associated rhinorrhea  Subjective   Wero Duran is a 62 y o  female  Information obtained from the patient    Language: English    -H/O breast cancer diagnosed 11/2018 in right breast  T2 N0 (stage IIA) invasive ductal carcinoma of the right breast status post breast conservation surgery (Dr Norah Crawford @ [facility])   Her tumor is ERPR positive her 2-  Oncotype DX returned 13 and Dr Singh Sterling recommended adjuvant hormone therapy without any chemotherapy  She started anastrozole on 2/1/19 and completed whole right breast radiation followed by boost to the primary site on 4/3/19   -Presents for BP follow-up today  Home BP runs 130-140/80-90  Ran out of Losartan 2 days ago  Has not really tried DASH diet yet  Does not take part in regular exercise   -Last pap smear > 5 years ago  -Never had colonoscopy, denies change in bowel habits or bleeding   -Expresses some fear and anxiety of doing more screening tests  Review of Systems   Constitutional: Negative for activity change, appetite change, chills and fever  Respiratory: Negative for shortness of breath  Cardiovascular: Negative for chest pain  Gastrointestinal: Negative for blood in stool, nausea and vomiting  Genitourinary: Negative for difficulty urinating and dysuria  Neurological: Negative for dizziness, weakness, light-headedness and headaches  Psychiatric/Behavioral: Negative for behavioral problems  Pertinent items are noted in HPI  Social History  - reports that she has never smoked  She has never used smokeless tobacco   - reports that she does not drink alcohol    - reports that she does not use drugs  Objective   Physical Exam   Constitutional: She is oriented to person, place, and time  She appears well-developed and well-nourished  No distress  HENT:   Head: Normocephalic and atraumatic  Eyes: Conjunctivae are normal    Tear observed leaking from right eye during interview  Neck: Normal range of motion  Cardiovascular: Normal rate, regular rhythm and normal heart sounds  No murmur heard    Pulmonary/Chest: Effort normal and breath sounds normal  No respiratory distress  She has no wheezes  Musculoskeletal: Normal range of motion  She exhibits no edema  Neurological: She is alert and oriented to person, place, and time  Psychiatric: She has a normal mood and affect  Her behavior is normal    Nursing note and vitals reviewed           Health Information   The following have been reviewed and updated: current medications    Allergies   Allergen Reactions    No Active Allergies        Current Outpatient Medications:     anastrozole (ARIMIDEX) 1 mg tablet, Take 1 tablet (1 mg total) by mouth daily, Disp: 30 tablet, Rfl: 5    atorvastatin (LIPITOR) 40 mg tablet, Take 1 tablet (40 mg total) by mouth daily at bedtime, Disp: 90 tablet, Rfl: 1    b complex vitamins tablet, Take 1 tablet by mouth daily, Disp: , Rfl:     cyanocobalamin (VITAMIN B-12) 1,000 mcg tablet, Take 1 tablet (1,000 mcg total) by mouth daily, Disp: 90 tablet, Rfl: 1    fluticasone (FLONASE) 50 mcg/act nasal spray, 2 sprays into each nostril daily, Disp: 16 g, Rfl: 2    folic acid (FOLVITE) 1 mg tablet, Take 1 tablet (1 mg total) by mouth every 24 hours, Disp: 90 tablet, Rfl: 1    losartan (COZAAR) 50 mg tablet, Take 1 tablet (50 mg total) by mouth daily, Disp: 90 tablet, Rfl: 1    ergocalciferol (VITAMIN D2) 50,000 units, Take 1 capsule (50,000 Units total) by mouth once a week for 50 days, Disp: 4 capsule, Rfl: 2    Patient Active Problem List   Diagnosis    Benign essential hypertension    Vasomotor rhinitis    Mixed hyperlipidemia    Malignant neoplasm of right female breast (HCC)    Vitamin D deficiency     Past Surgical History:   Procedure Laterality Date    BREAST SURGERY      [place] MASTECTOMY,PARTIAL,  WITH AXILLARY LYMPHADENECTOMY Right 12/20/2018    Procedure: ULTRASOUND LOCALIZED PARTIAL MASTECTOMY W/SENTINEL NODE BIOPSY POSS AXILLARY DISSECTION;  Surgeon: Simona Naqvi MD;  Location:  MAIN OR;  Service: General    TUBAL LIGATION      US GUIDED BREAST BIOPSY RIGHT COMPLETE Right 11/23/2018     Family History   Problem Relation Age of Onset    Colon cancer Mother 62    Hypertension Mother     Cancer Father     Pancreatic cancer Father 61    Hypertension Son     Hypertension Daughter      Health Maintenance   Topic Date Due    Hepatitis C Screening  1955    CRC Screening: Colonoscopy  1955    Pneumococcal Vaccine: Pediatrics (0 to 5 Years) and At-Risk Patients (6 to 59 Years) (1 of 3 - PCV13) 02/13/1962    BMI: Followup Plan  02/13/1974    PAP SMEAR  02/13/1977    INFLUENZA VACCINE  07/01/2019    Depression Screening PHQ  02/05/2020    BMI: Adult  05/23/2020    MAMMOGRAM  07/29/2020    Pneumococcal Vaccine: 65+ Years (1 of 2 - PCV13) 02/13/2021    DTaP,Tdap,and Td Vaccines (2 - Td) 04/25/2028    HEPATITIS B VACCINES  Aged Out

## 2019-08-19 ENCOUNTER — TELEPHONE (OUTPATIENT)
Dept: FAMILY MEDICINE CLINIC | Facility: CLINIC | Age: 64
End: 2019-08-19

## 2019-08-19 NOTE — TELEPHONE ENCOUNTER
LM on VM  Eye dr RANGEL([phone]) is on 8/23/2019 at 1:30 pm at [facility], [address]

## 2019-09-16 ENCOUNTER — TELEPHONE (OUTPATIENT)
Dept: FAMILY MEDICINE CLINIC | Facility: CLINIC | Age: 64
End: 2019-09-16

## 2019-09-16 NOTE — TELEPHONE ENCOUNTER
Regarding: No message from patient  ----- Message from Francisca Madrigal MD sent at 9/16/2019  7:45 AM EDT -----  This patient missed her gyn appointment with me  Please call her and offer her to be rescheduled with me  Thank you kindly  This encounter does not contain a message from the patient

## 2019-09-26 ENCOUNTER — TELEPHONE (OUTPATIENT)
Dept: HEMATOLOGY ONCOLOGY | Facility: CLINIC | Age: 64
End: 2019-09-26

## 2019-09-26 NOTE — TELEPHONE ENCOUNTER
Called patient and LM stating that she needs to have her labs done prior to her visit tomorrow with Linda Camara

## 2019-09-26 NOTE — TELEPHONE ENCOUNTER
I left a voicemail at patient's son cell phone asking to have the patient calling us so we can reminding her that she should get her blood work done prior her appointment otherwise we have to reschedule it

## 2019-10-11 ENCOUNTER — CONSULT (OUTPATIENT)
Dept: SURGERY | Facility: CLINIC | Age: 64
End: 2019-10-11
Payer: COMMERCIAL

## 2019-10-11 VITALS
WEIGHT: 273.6 LBS | BODY MASS INDEX: 43.97 KG/M2 | TEMPERATURE: 97.6 F | HEIGHT: 66 IN | SYSTOLIC BLOOD PRESSURE: 156 MMHG | HEART RATE: 99 BPM | DIASTOLIC BLOOD PRESSURE: 82 MMHG

## 2019-10-11 DIAGNOSIS — Z12.11 SCREENING FOR COLON CANCER: ICD-10-CM

## 2019-10-11 PROCEDURE — 99243 OFF/OP CNSLTJ NEW/EST LOW 30: CPT | Performed by: PHYSICIAN ASSISTANT

## 2019-10-11 NOTE — PROGRESS NOTES
Assessment/Plan:   Raiza Santo is a 62 y  o female who is here for a:     First Screening Colonoscopy  Patient with personal history of breast cancer, Stage 1A pT2,N0, ER/RI positive status post surgery and currently on Arimidex followed by Dr Dalton Andujar and Oncology    Mother with colon cancer at age 61    No ovarian cancer history    Plan: Colonoscopy for: Screening for Colon Cancer        Previous Colonoscopy and  Location of polyps if any:   none        Preoperative Clearance: None        ______________________________________________________    HPI:  Raiza Santo is a 62 y  o female who was referred for evaluation of    First Screening  Patient with personal history of breast cancer, Stage 1A pT2,N0, ER/RI positive status post surgery and currently on Arimidex followed by Dr Dalton Andujar and Oncology    Currently:     Symptoms include:  no abdominal pain, change in bowel habits, or black or bloody stools  Family history of colon cancer:  Mother age 61, dceased        Anticoagulation: none    LABS:      Lab Results   Component Value Date    WBC 7 10 05/23/2019    HGB 11 8 (L) 05/23/2019    HCT 35 1 (L) 05/23/2019    MCV 86 05/23/2019     05/23/2019     Lab Results   Component Value Date    K 3 8 05/23/2019     05/23/2019    CO2 27 05/23/2019    BUN 17 05/23/2019    CREATININE 0 69 05/23/2019    CALCIUM 9 3 05/23/2019    AST 24 05/23/2019    ALT 20 05/23/2019    ALKPHOS 104 05/23/2019    EGFR 107 05/23/2019     No results found for: HGBA1C  No results found for: INR, PROTIME      No orders to display           ROS:  General ROS: negative for - chills, fatigue, fever or night sweats, weight loss  Respiratory ROS: no cough, shortness of breath, or wheezing  Cardiovascular ROS: no chest pain or dyspnea on exertion  Genito-Urinary ROS: no dysuria, trouble voiding, or hematuria  Musculoskeletal ROS: negative for - gait disturbance, joint pain or muscle pain  Neurological ROS: no TIA or stroke symptoms  GI ROS: see HPI  Skin ROS: no new rashes or lesions   Lymphatic ROS: no new adenopathy noted by pt  GYN ROS: see HPI, no new GYN history or bleeding noted  Psy ROS: no new mental or behavioral disturbances           Imaging: No new pertinent imaging studies           Patient Active Problem List   Diagnosis    Benign essential hypertension    Vasomotor rhinitis    Mixed hyperlipidemia    Malignant neoplasm of right female breast ([place] [address] )    Vitamin D deficiency         Allergies:  No active allergies      Current Outpatient Medications:     anastrozole (ARIMIDEX) 1 mg tablet, Take 1 tablet (1 mg total) by mouth daily, Disp: 30 tablet, Rfl: 5    atorvastatin (LIPITOR) 40 mg tablet, Take 1 tablet (40 mg total) by mouth daily at bedtime, Disp: 90 tablet, Rfl: 1    b complex vitamins tablet, Take 1 tablet by mouth daily, Disp: , Rfl:     cyanocobalamin (VITAMIN B-12) 1,000 mcg tablet, Take 1 tablet (1,000 mcg total) by mouth daily, Disp: 90 tablet, Rfl: 1    fluticasone (FLONASE) 50 mcg/act nasal spray, 2 sprays into each nostril daily, Disp: 16 g, Rfl: 2    folic acid (FOLVITE) 1 mg tablet, Take 1 tablet (1 mg total) by mouth every 24 hours, Disp: 90 tablet, Rfl: 1    losartan (COZAAR) 50 mg tablet, Take 1 tablet (50 mg total) by mouth daily, Disp: 90 tablet, Rfl: 1    ergocalciferol (VITAMIN D2) 50,000 units, Take 1 capsule (50,000 Units total) by mouth once a week for 50 days, Disp: 4 capsule, Rfl: 2    Past Medical History:   Diagnosis Date    Anemia     Arthritis     Breast cancer ([place] [address] )     Cancer ([place] [address] )     right breast    GERD (gastroesophageal reflux disease)     Hyperlipidemia     Hypertension     Stroke ([place] [address] )     TIA        Past Surgical History:   Procedure Laterality Date    BREAST SURGERY      [place] MASTECTOMY,PARTIAL,  WITH AXILLARY LYMPHADENECTOMY Right 12/20/2018    Procedure: ULTRASOUND LOCALIZED PARTIAL MASTECTOMY W/SENTINEL NODE BIOPSY POSS AXILLARY DISSECTION; Surgeon: Kwabena Villalobos MD;  Location:  MAIN OR;  Service: General    TUBAL LIGATION      US GUIDED BREAST BIOPSY RIGHT COMPLETE Right 11/23/2018       Family History   Problem Relation Age of Onset    Colon cancer Mother 62    Hypertension Mother     Cancer Father     Pancreatic cancer Father 61    Hypertension Son     Hypertension Daughter        Social History     Socioeconomic History    Marital status: Single     Spouse name: None    Number of children: None    Years of education: None    Highest education level: None   Occupational History    None   Social Needs    Financial resource strain: None    Food insecurity:     Worry: None     Inability: None    Transportation needs:     Medical: None     Non-medical: None   Tobacco Use    Smoking status: Never Smoker    Smokeless tobacco: Never Used    Tobacco comment: NO TOBACCO USE   Substance and Sexual Activity    Alcohol use: No    Drug use: No    Sexual activity: None   Lifestyle    Physical activity:     Days per week: None     Minutes per session: None    Stress: None   Relationships    Social connections:     Talks on phone: None     Gets together: None     Attends [employer] service: None     Active member of club or organization: None     Attends meetings of clubs or organizations: None     Relationship status: None    Intimate partner violence:     Fear of current or ex partner: None     Emotionally abused: None     Physically abused: None     Forced sexual activity: None   Other Topics Concern    None   Social History Narrative    None       Exam:   Vitals:    10/11/19 0946   BP: 156/82   Pulse: 99   Temp: 97 6 °F (36 4 °C)           ______________________________________________________    PHYSICAL EXAM  General Appearance:    Alert, cooperative, no distress,     Head:    Normocephalic without obvious abnormality   Eyes:    PERRL, conjunctiva/corneas clear, EOM's intact        Neck:   Supple, no adenopathy, no JVD Back:     Symmetric, no spinal or CVA tenderness   Lungs:     Clear to auscultation bilaterally, no wheezing or rhonchi   Heart:    Regular rate and rhythm, S1 and S2 normal, no murmur   Abdomen:     Soft,NTND, +BS   Extremities:   Extremities normal  No clubbing, cyanosis or edema   Psych:   Normal Affect   Neurologic:   CNII-XII intact  Strength symmetric, speech intact                 Susy Byrd PA-C  Date: 10/11/2019 Time: 10:00 AM       This report has been generated by a voice recognition software system  Therefore, there may be syntax, spelling, and/or grammatical errors  Please call if you've any questions  This  encounter has been billed by a non certified Coder  Informed consent for procedure was personally discussed, reviewed, and signed by Dr Celestina Lovelace  Discussion by Dr Celestina Lovelace was carried out regarding risks, benefits, and alternatives with the patient  Risks include but are not limited to:  bleeding, infection, and delayed wound healing or an open wound, pulmonary embolus, leaks from bowel or bile ducts or other viscus, transfusions, death  Discussed in further detail the more common complications and their rates of occurrence   was used if necessary  Patient expressed understanding of the issues discussed and wished/consented to proceed  All questions were answered by Dr Celestina Lovelace  Discussion performed between patient and the provider signing below  Signature:   Brianna Thompson  Date: 10/11/2019 Time: 10:00 AM                                                                                                                                        Informed consent for procedure was personally discussed, reviewed, and signed by Dr Celestina Lovelace  Discussion by Dr Celestina Lovelace was carried out regarding risks, benefits, and alternatives with the patient   Risks include but are not limited to:  bleeding, infection, and delayed wound healing or an open wound, pulmonary embolus, leaks from bowel or bile ducts or other viscus, transfusions, death  Discussed in further detail the more common complications and their rates of occurrence   was used if necessary  Patient expressed understanding of the issues discussed and wished/consented to proceed  All questions were answered by Dr Natalie Cuenca  Discussion performed between patient and the provider signing below  Signature:   Mireya Robertson MD    Date: 10/11/2019 Time: 10:00 AM           Some portions of this record may have been generated with voice recognition software  There may be translation, syntax,  or grammatical errors  Occasional wrong word or "sound-a-like" substitutions may have occurred due to the inherent limitations of the voice recognition software  Read the chart carefully and recognize, using context, where substitutions may have occurred  If you have any questions, please contact the dictating provider for clarification or correction, as needed  This encounter has been coded by a non-certified coder

## 2019-10-11 NOTE — H&P (VIEW-ONLY)
Assessment/Plan:   Polly Herrera is a 62 y  o female who is here for a:     First Screening Colonoscopy  Patient with personal history of breast cancer, Stage 1A pT2,N0, ER/WA positive status post surgery and currently on Arimidex followed by Dr Yovany Blanchard and Oncology    Mother with colon cancer at age 61    No ovarian cancer history    Plan: Colonoscopy for: Screening for Colon Cancer        Previous Colonoscopy and  Location of polyps if any:   none        Preoperative Clearance: None        ______________________________________________________    HPI:  Polly Herrera is a 62 y  o female who was referred for evaluation of    First Screening  Patient with personal history of breast cancer, Stage 1A pT2,N0, ER/WA positive status post surgery and currently on Arimidex followed by Dr Yovany Blanchard and Oncology    Currently:     Symptoms include:  no abdominal pain, change in bowel habits, or black or bloody stools  Family history of colon cancer:  Mother age 61, dceased        Anticoagulation: none    LABS:      Lab Results   Component Value Date    WBC 7 10 05/23/2019    HGB 11 8 (L) 05/23/2019    HCT 35 1 (L) 05/23/2019    MCV 86 05/23/2019     05/23/2019     Lab Results   Component Value Date    K 3 8 05/23/2019     05/23/2019    CO2 27 05/23/2019    BUN 17 05/23/2019    CREATININE 0 69 05/23/2019    CALCIUM 9 3 05/23/2019    AST 24 05/23/2019    ALT 20 05/23/2019    ALKPHOS 104 05/23/2019    EGFR 107 05/23/2019     No results found for: HGBA1C  No results found for: INR, PROTIME      No orders to display           ROS:  General ROS: negative for - chills, fatigue, fever or night sweats, weight loss  Respiratory ROS: no cough, shortness of breath, or wheezing  Cardiovascular ROS: no chest pain or dyspnea on exertion  Genito-Urinary ROS: no dysuria, trouble voiding, or hematuria  Musculoskeletal ROS: negative for - gait disturbance, joint pain or muscle pain  Neurological ROS: no TIA or stroke symptoms  GI ROS: see HPI  Skin ROS: no new rashes or lesions   Lymphatic ROS: no new adenopathy noted by pt  GYN ROS: see HPI, no new GYN history or bleeding noted  Psy ROS: no new mental or behavioral disturbances           Imaging: No new pertinent imaging studies           Patient Active Problem List   Diagnosis    Benign essential hypertension    Vasomotor rhinitis    Mixed hyperlipidemia    Malignant neoplasm of right female breast ([place] [address] )    Vitamin D deficiency         Allergies:  No active allergies      Current Outpatient Medications:     anastrozole (ARIMIDEX) 1 mg tablet, Take 1 tablet (1 mg total) by mouth daily, Disp: 30 tablet, Rfl: 5    atorvastatin (LIPITOR) 40 mg tablet, Take 1 tablet (40 mg total) by mouth daily at bedtime, Disp: 90 tablet, Rfl: 1    b complex vitamins tablet, Take 1 tablet by mouth daily, Disp: , Rfl:     cyanocobalamin (VITAMIN B-12) 1,000 mcg tablet, Take 1 tablet (1,000 mcg total) by mouth daily, Disp: 90 tablet, Rfl: 1    fluticasone (FLONASE) 50 mcg/act nasal spray, 2 sprays into each nostril daily, Disp: 16 g, Rfl: 2    folic acid (FOLVITE) 1 mg tablet, Take 1 tablet (1 mg total) by mouth every 24 hours, Disp: 90 tablet, Rfl: 1    losartan (COZAAR) 50 mg tablet, Take 1 tablet (50 mg total) by mouth daily, Disp: 90 tablet, Rfl: 1    ergocalciferol (VITAMIN D2) 50,000 units, Take 1 capsule (50,000 Units total) by mouth once a week for 50 days, Disp: 4 capsule, Rfl: 2    Past Medical History:   Diagnosis Date    Anemia     Arthritis     Breast cancer ([place] [address] )     Cancer ([place] [address] )     right breast    GERD (gastroesophageal reflux disease)     Hyperlipidemia     Hypertension     Stroke ([place] [address] )     TIA        Past Surgical History:   Procedure Laterality Date    BREAST SURGERY      IN MASTECTOMY,PARTIAL,  WITH AXILLARY LYMPHADENECTOMY Right 12/20/2018    Procedure: ULTRASOUND LOCALIZED PARTIAL MASTECTOMY W/SENTINEL NODE BIOPSY POSS AXILLARY DISSECTION; Surgeon: Rosalva De Dios MD;  Location:  MAIN OR;  Service: General    TUBAL LIGATION      US GUIDED BREAST BIOPSY RIGHT COMPLETE Right 11/23/2018       Family History   Problem Relation Age of Onset    Colon cancer Mother 62    Hypertension Mother     Cancer Father     Pancreatic cancer Father 61    Hypertension Son     Hypertension Daughter        Social History     Socioeconomic History    Marital status: Single     Spouse name: None    Number of children: None    Years of education: None    Highest education level: None   Occupational History    None   Social Needs    Financial resource strain: None    Food insecurity:     Worry: None     Inability: None    Transportation needs:     Medical: None     Non-medical: None   Tobacco Use    Smoking status: Never Smoker    Smokeless tobacco: Never Used    Tobacco comment: NO TOBACCO USE   Substance and Sexual Activity    Alcohol use: No    Drug use: No    Sexual activity: None   Lifestyle    Physical activity:     Days per week: None     Minutes per session: None    Stress: None   Relationships    Social connections:     Talks on phone: None     Gets together: None     Attends Gnosticism service: None     Active member of club or organization: None     Attends meetings of clubs or organizations: None     Relationship status: None    Intimate partner violence:     Fear of current or ex partner: None     Emotionally abused: None     Physically abused: None     Forced sexual activity: None   Other Topics Concern    None   Social History Narrative    None       Exam:   Vitals:    10/11/19 0946   BP: 156/82   Pulse: 99   Temp: 97 6 °F (36 4 °C)           ______________________________________________________    PHYSICAL EXAM  General Appearance:    Alert, cooperative, no distress,     Head:    Normocephalic without obvious abnormality   Eyes:    PERRL, conjunctiva/corneas clear, EOM's intact        Neck:   Supple, no adenopathy, no JVD Back:     Symmetric, no spinal or CVA tenderness   Lungs:     Clear to auscultation bilaterally, no wheezing or rhonchi   Heart:    Regular rate and rhythm, S1 and S2 normal, no murmur   Abdomen:     Soft,NTND, +BS   Extremities:   Extremities normal  No clubbing, cyanosis or edema   Psych:   Normal Affect   Neurologic:   CNII-XII intact  Strength symmetric, speech intact                 Good Jacobs PA-C  Date: 10/11/2019 Time: 10:00 AM       This report has been generated by a voice recognition software system  Therefore, there may be syntax, spelling, and/or grammatical errors  Please call if you've any questions  This  encounter has been billed by a non certified Coder  Informed consent for procedure was personally discussed, reviewed, and signed by Dr Violeta Young  Discussion by Dr Violeta Young was carried out regarding risks, benefits, and alternatives with the patient  Risks include but are not limited to:  bleeding, infection, and delayed wound healing or an open wound, pulmonary embolus, leaks from bowel or bile ducts or other viscus, transfusions, death  Discussed in further detail the more common complications and their rates of occurrence   was used if necessary  Patient expressed understanding of the issues discussed and wished/consented to proceed  All questions were answered by Dr Violeta Young  Discussion performed between patient and the provider signing below  Signature:   Karol Nichole  Date: 10/11/2019 Time: 10:00 AM                                                                                                                                        Informed consent for procedure was personally discussed, reviewed, and signed by Dr Violeta Young  Discussion by Dr Violeta Young was carried out regarding risks, benefits, and alternatives with the patient   Risks include but are not limited to:  bleeding, infection, and delayed wound healing or an open wound, pulmonary embolus, leaks from bowel or bile ducts or other viscus, transfusions, death  Discussed in further detail the more common complications and their rates of occurrence   was used if necessary  Patient expressed understanding of the issues discussed and wished/consented to proceed  All questions were answered by Dr Juany Arriola  Discussion performed between patient and the provider signing below  Signature:   Dean Morrow MD    Date: 10/11/2019 Time: 10:00 AM           Some portions of this record may have been generated with voice recognition software  There may be translation, syntax,  or grammatical errors  Occasional wrong word or "sound-a-like" substitutions may have occurred due to the inherent limitations of the voice recognition software  Read the chart carefully and recognize, using context, where substitutions may have occurred  If you have any questions, please contact the dictating provider for clarification or correction, as needed  This encounter has been coded by a non-certified coder

## 2019-10-18 ENCOUNTER — TELEPHONE (OUTPATIENT)
Dept: HEMATOLOGY ONCOLOGY | Facility: CLINIC | Age: 64
End: 2019-10-18

## 2019-10-18 NOTE — TELEPHONE ENCOUNTER
Called pt as she missed her rescheduled f/u apt today 10/18/19 at 11:00 w/JOANN Rodriguez All  Called pt, and left message to complete labs and to call our office to reschedule missed apt

## 2019-10-25 ENCOUNTER — TELEPHONE (OUTPATIENT)
Dept: SURGERY | Facility: CLINIC | Age: 64
End: 2019-10-25

## 2019-10-25 NOTE — TELEPHONE ENCOUNTER
Tried reaching patient, no answer  Left message on voicemail for patient to return call  Colonoscopy reminder

## 2019-10-28 RX ORDER — SODIUM CHLORIDE 9 MG/ML
125 INJECTION, SOLUTION INTRAVENOUS CONTINUOUS
OUTPATIENT
Start: 2019-10-28

## 2019-10-29 ENCOUNTER — HOSPITAL ENCOUNTER (OUTPATIENT)
Dept: GASTROENTEROLOGY | Facility: HOSPITAL | Age: 64
Setting detail: OUTPATIENT SURGERY
Discharge: HOME | End: 2019-10-29
Attending: SURGERY

## 2019-10-29 DIAGNOSIS — Z12.11 SCREENING FOR COLON CANCER: ICD-10-CM

## 2020-06-09 ENCOUNTER — OFFICE VISIT (OUTPATIENT)
Dept: FAMILY MEDICINE CLINIC | Facility: CLINIC | Age: 65
End: 2020-06-09

## 2020-06-09 VITALS
WEIGHT: 272 LBS | TEMPERATURE: 96.7 F | SYSTOLIC BLOOD PRESSURE: 130 MMHG | BODY MASS INDEX: 43.71 KG/M2 | HEART RATE: 72 BPM | DIASTOLIC BLOOD PRESSURE: 80 MMHG | OXYGEN SATURATION: 99 % | HEIGHT: 66 IN | RESPIRATION RATE: 16 BRPM

## 2020-06-09 DIAGNOSIS — C50.411 MALIGNANT NEOPLASM OF UPPER-OUTER QUADRANT OF RIGHT BREAST IN FEMALE, ESTROGEN RECEPTOR POSITIVE (HCC): ICD-10-CM

## 2020-06-09 DIAGNOSIS — I10 BENIGN ESSENTIAL HYPERTENSION: Primary | ICD-10-CM

## 2020-06-09 DIAGNOSIS — Z17.0 MALIGNANT NEOPLASM OF UPPER-OUTER QUADRANT OF RIGHT BREAST IN FEMALE, ESTROGEN RECEPTOR POSITIVE (HCC): ICD-10-CM

## 2020-06-09 PROCEDURE — 3079F DIAST BP 80-89 MM HG: CPT | Performed by: FAMILY MEDICINE

## 2020-06-09 PROCEDURE — 1036F TOBACCO NON-USER: CPT | Performed by: FAMILY MEDICINE

## 2020-06-09 PROCEDURE — 99213 OFFICE O/P EST LOW 20 MIN: CPT | Performed by: FAMILY MEDICINE

## 2020-06-09 PROCEDURE — 3008F BODY MASS INDEX DOCD: CPT | Performed by: FAMILY MEDICINE

## 2020-06-09 PROCEDURE — 3075F SYST BP GE 130 - 139MM HG: CPT | Performed by: FAMILY MEDICINE

## 2020-06-09 RX ORDER — LOSARTAN POTASSIUM 50 MG/1
50 TABLET ORAL DAILY
Qty: 90 TABLET | Refills: 1 | Status: SHIPPED | OUTPATIENT
Start: 2020-06-09 | End: 2021-08-16 | Stop reason: SDUPTHER

## 2020-07-03 DIAGNOSIS — I10 BENIGN ESSENTIAL HYPERTENSION: ICD-10-CM

## 2020-07-03 DIAGNOSIS — C50.411 MALIGNANT NEOPLASM OF UPPER-OUTER QUADRANT OF RIGHT BREAST IN FEMALE, ESTROGEN RECEPTOR POSITIVE (HCC): ICD-10-CM

## 2020-07-03 DIAGNOSIS — Z86.2 HISTORY OF ANEMIA: ICD-10-CM

## 2020-07-03 DIAGNOSIS — Z17.0 MALIGNANT NEOPLASM OF UPPER-OUTER QUADRANT OF RIGHT BREAST IN FEMALE, ESTROGEN RECEPTOR POSITIVE (HCC): ICD-10-CM

## 2020-07-03 DIAGNOSIS — E78.2 MIXED HYPERLIPIDEMIA: ICD-10-CM

## 2020-07-03 RX ORDER — ANASTROZOLE 1 MG/1
TABLET ORAL
Qty: 30 TABLET | Refills: 5 | Status: SHIPPED | OUTPATIENT
Start: 2020-07-03 | End: 2021-07-14

## 2020-07-03 RX ORDER — ASPIRIN 81 MG/1
TABLET, COATED ORAL
Qty: 30 TABLET | Refills: 4 | OUTPATIENT
Start: 2020-07-03

## 2020-07-05 RX ORDER — ATORVASTATIN CALCIUM 40 MG/1
TABLET, FILM COATED ORAL
Qty: 30 TABLET | Refills: 11 | Status: SHIPPED | OUTPATIENT
Start: 2020-07-05 | End: 2021-07-14

## 2020-07-05 RX ORDER — FOLIC ACID 1 MG/1
TABLET ORAL
Qty: 30 TABLET | Refills: 11 | Status: SHIPPED | OUTPATIENT
Start: 2020-07-05 | End: 2021-07-14

## 2021-01-22 ENCOUNTER — TELEPHONE (OUTPATIENT)
Dept: SURGERY | Facility: CLINIC | Age: 66
End: 2021-01-22

## 2021-01-22 NOTE — TELEPHONE ENCOUNTER
Attempted to contact patient regarding scheduling colonoscopy  No answer - voicemail not set up      E-mailed patient to contact the office     (she was scheduled for follow up a in September - appt was cancelled )

## 2021-02-25 ENCOUNTER — HOSPITAL ENCOUNTER (EMERGENCY)
Facility: HOSPITAL | Age: 66
Discharge: HOME | End: 2021-02-25
Attending: EMERGENCY MEDICINE
Payer: COMMERCIAL

## 2021-02-25 ENCOUNTER — APPOINTMENT (EMERGENCY)
Dept: CT IMAGING | Facility: HOSPITAL | Age: 66
End: 2021-02-25
Payer: COMMERCIAL

## 2021-02-25 ENCOUNTER — APPOINTMENT (EMERGENCY)
Dept: RADIOLOGY | Facility: HOSPITAL | Age: 66
End: 2021-02-25
Payer: COMMERCIAL

## 2021-02-25 VITALS
DIASTOLIC BLOOD PRESSURE: 81 MMHG | TEMPERATURE: 98.9 F | HEART RATE: 67 BPM | RESPIRATION RATE: 18 BRPM | SYSTOLIC BLOOD PRESSURE: 176 MMHG | OXYGEN SATURATION: 100 %

## 2021-02-25 DIAGNOSIS — S72.412A: Primary | ICD-10-CM

## 2021-02-25 DIAGNOSIS — S93.402A LEFT ANKLE SPRAIN: ICD-10-CM

## 2021-02-25 PROCEDURE — 96372 THER/PROPH/DIAG INJ SC/IM: CPT

## 2021-02-25 PROCEDURE — 99284 EMERGENCY DEPT VISIT MOD MDM: CPT | Performed by: EMERGENCY MEDICINE

## 2021-02-25 PROCEDURE — 73700 CT LOWER EXTREMITY W/O DYE: CPT

## 2021-02-25 PROCEDURE — 99284 EMERGENCY DEPT VISIT MOD MDM: CPT

## 2021-02-25 PROCEDURE — 73610 X-RAY EXAM OF ANKLE: CPT

## 2021-02-25 PROCEDURE — 73564 X-RAY EXAM KNEE 4 OR MORE: CPT

## 2021-02-25 RX ORDER — IBUPROFEN 600 MG/1
600 TABLET ORAL EVERY 6 HOURS PRN
Qty: 30 TABLET | Refills: 0 | Status: SHIPPED | OUTPATIENT
Start: 2021-02-25 | End: 2021-06-10 | Stop reason: ALTCHOICE

## 2021-02-25 RX ORDER — KETOROLAC TROMETHAMINE 30 MG/ML
15 INJECTION, SOLUTION INTRAMUSCULAR; INTRAVENOUS ONCE
Status: COMPLETED | OUTPATIENT
Start: 2021-02-25 | End: 2021-02-25

## 2021-02-25 RX ADMIN — KETOROLAC TROMETHAMINE 15 MG: 30 INJECTION, SOLUTION INTRAMUSCULAR at 16:57

## 2021-02-25 NOTE — DISCHARGE INSTRUCTIONS
No weight bearing on the left knee whatsoever  Keep it immobilized when you are ambulating  When you resting, keep it elevated with ice for the next 24-48 hours  Ankle Sprain   WHAT YOU NEED TO KNOW:   An ankle sprain happens when 1 or more ligaments in your ankle joint stretch or tear  Ligaments are tough tissues that connect bones  Ligaments support your joints and keep your bones in place  DISCHARGE INSTRUCTIONS:   Rest  your ankle so that it can heal  Return to normal activities as directed  Apply ice on your ankle for 15 to 20 minutes every hour or as directed  Use an ice pack, or put crushed ice in a plastic bag  Cover it with a towel  Ice helps prevent tissue damage and decreases swelling and pain  Compress  your ankle  Ask if you should wrap an elastic bandage around your injured ligament  An elastic bandage provides support and helps decrease swelling and movement so your joint can heal  Wear as long as directed  Elevate  your ankle above the level of your heart as often as you can  This will help decrease swelling and pain  Prop your ankle on pillows or blankets to keep it elevated comfortably  Return to the emergency department if: You have severe pain in your ankle  Your foot or toes are cold or numb  Your swelling has increased or returned  Prevent another ankle sprain:   Let your ankle heal   Find out how long your ligament needs to heal  Do not do any physical activity until your healthcare provider says it is okay  If you start activity too soon, you may develop a more serious injury  Always warm up and stretch  before you exercise or play sports  Use the right equipment  Always wear shoes that fit well and are made for the activity that you are doing  You may also need ankle supports, elbow and knee pads, or braces    Follow up with your healthcare provider as directed:

## 2021-02-25 NOTE — Clinical Note
Joycelyn Severance was seen and treated in our emergency department on 2/25/2021  No weight bearing on left leg, must use crutches and knee immobilizer at all times when ambulating    Diagnosis: Left Femoral Condyle Fracture    Maira    She may return on this date: If you have any questions or concerns, please don't hesitate to call        Alicia King MD    ______________________________           _______________          _______________  Hospital Representative                              Date                                Time

## 2021-02-25 NOTE — ED ATTENDING ATTESTATION
2/25/2021  I, Meseret Ramon MD, saw and evaluated the patient  I have discussed the patient with the resident/non-physician practitioner and agree with the resident's/non-physician practitioner's findings, Plan of Care, and MDM as documented in the resident's/non-physician practitioner's note, except where noted  All available labs and Radiology studies were reviewed  I was present for key portions of any procedure(s) performed by the resident/non-physician practitioner and I was immediately available to provide assistance  At this point I agree with the current assessment done in the Emergency Department  I have conducted an independent evaluation of this patient a history and physical is as follows:  74 yo female c/o left leg pain, localizing to knee and ankle after slipping on ice yesterday, took advil, swelling of ankle and some swelling of anterior knee  She has been ambulatory with a limp  Xrays, NSAIDs, reassess  ED Course  ED Course as of Feb 26 1519   Thu Feb 25, 2021   1702 D/W Dr Stacy Quintaan, reviewed xrays, and advised left knee immobilizer, NWB, crutches  Reviewed results with patient at bedside and updated on the plan              Critical Care Time  Procedures

## 2021-02-25 NOTE — ED PROVIDER NOTES
EXAMINATION TYPE: NM bone/joint limited

 

DATE OF EXAM: 2/15/2021

 

COMPARISON: 6/24/2018 x-ray

 

HISTORY: Pain

 

TECHNIQUE:  After the intravenous administration of 26.6 mCi Tc 99m MDP.  Images acquired 3 hours pos
t injection.  Multiple views of the tibia and fibula are submitted. 

 

There is intense abnormal uptake involving the mid diaphysis of the right tibia and fibula compatible
 with previous trauma and fracture. Nonspecific faint uptake seen posteriorly along the distal tibia.
 

 

IMPRESSION: 

1. Nonspecific uptake involving the tibia and fibula appears to correspond to the previous fracture s
ites by x-ray.

2. Additional faint tiny area of abnormal uptake involving the distal tibia is too small to character
ize and of questionable significance. History  Chief Complaint   Patient presents with    Leg Injury     Pt reports slipping on black ice yesterday  C/o pain in left leg below the knee and at the ankle +edema +advil use this morning with no relief  -loc/hit head with fall -nvdh     73year old female presents to ED for evaluation of left lower extremity pain post fall on black ice yesterday  Patient states she feel on her knee, denies abrasions on the knee  She also states she might have injured her ankle as well  Complaints ankle hurts with ambulation  She has been limping since the fall  She has iced her knee and ankle along with taking Advil which did not give much relief  Described constant pain to the knee 10/10  Denies hitting her head  Denies any other complaints  Prior to Admission Medications   Prescriptions Last Dose Informant Patient Reported? Taking?   anastrozole (ARIMIDEX) 1 mg tablet   No No   Sig: TAKE 1 TABLET BY MOUTH ONCE DAILY     atorvastatin (LIPITOR) 40 mg tablet   No No   Sig: TAKE ONE TABLET BY MOUTH AT BEDTIME   b complex vitamins tablet  Self Yes No   Sig: Take 1 tablet by mouth daily   cyanocobalamin (VITAMIN B-12) 1,000 mcg tablet   No No   Sig: Take 1 tablet (1,000 mcg total) by mouth daily   ergocalciferol (VITAMIN D2) 50,000 units   No No   Sig: Take 1 capsule (50,000 Units total) by mouth once a week for 50 days   fluticasone (FLONASE) 50 mcg/act nasal spray   No No   Si sprays into each nostril daily   folic acid (FOLVITE) 1 mg tablet   No No   Sig: TAKE ONE TABLET BY MOUTH EVERY DAY   losartan (COZAAR) 50 mg tablet   No No   Sig: Take 1 tablet (50 mg total) by mouth daily      Facility-Administered Medications: None       Past Medical History:   Diagnosis Date    Anemia     Arthritis     [facility]     [facility]     right breast    GERD (gastroesophageal reflux disease)     Hyperlipidemia     Hypertension     Stroke ([facility] )     TIA        Past Surgical History:   Procedure Laterality Date    BREAST SURGERY      SD MASTECTOMY,PARTIAL,  WITH AXILLARY LYMPHADENECTOMY Right 12/20/2018    Procedure: ULTRASOUND LOCALIZED PARTIAL MASTECTOMY W/SENTINEL NODE BIOPSY POSS AXILLARY DISSECTION;  Surgeon: Marie Cool MD;  Location: [address] [place] [address] OR;  Service: General    TUBAL LIGATION      US GUIDED BREAST BIOPSY RIGHT COMPLETE Right 11/23/2018       Family History   Problem Relation Age of Onset    Colon cancer Mother 62    Hypertension Mother     Cancer Father     Pancreatic cancer Father 61    Hypertension Son     Hypertension Daughter      I have reviewed and agree with the history as documented  E-Cigarette/Vaping    E-Cigarette Use Never User      E-Cigarette/Vaping Substances    Nicotine No     THC No     CBD No     Flavoring No     Other No     Unknown No      Social History     Tobacco Use    Smoking status: Never Smoker    Smokeless tobacco: Never Used    Tobacco comment: NO TOBACCO USE   Substance Use Topics    Alcohol use: No    Drug use: No        Review of Systems   Constitutional: Negative  Negative for chills and fever  HENT: Negative  Eyes: Negative  Respiratory: Negative  Negative for wheezing  Cardiovascular: Negative  Gastrointestinal: Negative for diarrhea, nausea and vomiting  Genitourinary: Negative  Musculoskeletal: Positive for gait problem and joint swelling  Skin: Negative  Neurological: Negative for weakness  Psychiatric/Behavioral: Negative  Negative for agitation and behavioral problems         Physical Exam  ED Triage Vitals [02/25/21 1249]   Temperature Pulse Respirations Blood Pressure SpO2   98 9 °F (37 2 °C) 89 18 161/87 100 %      Temp Source Heart Rate Source Patient Position - Orthostatic VS BP Location FiO2 (%)   Oral Monitor Sitting Right arm --      Pain Score       1             Orthostatic Vital Signs  Vitals:    02/25/21 1249   BP: 161/87   Pulse: 89   Patient Position - Orthostatic VS: Sitting       Physical Exam  Constitutional:       Appearance: Normal appearance  She is obese  HENT:      Head: Atraumatic  Eyes:      Extraocular Movements: Extraocular movements intact  Neck:      Musculoskeletal: Neck supple  Cardiovascular:      Rate and Rhythm: Normal rate  Pulses: Normal pulses  Pulmonary:      Breath sounds: Normal breath sounds  Musculoskeletal:         General: Swelling, tenderness and signs of injury present  Left knee: She exhibits decreased range of motion, swelling, erythema and bony tenderness  She exhibits no deformity and no laceration  Tenderness found  Medial joint line, MCL and patellar tendon tenderness noted  Left lower leg: Edema present  Comments: Tenderness to touch at the medial and lateral malleolus  Limited DF/PF due to pain  No gross deformity noted  Skin:     General: Skin is warm and dry  Capillary Refill: Capillary refill takes less than 2 seconds  Neurological:      Mental Status: She is alert and oriented to person, place, and time  Psychiatric:         Mood and Affect: Mood normal          ED Medications  Medications   ketorolac (TORADOL) injection 15 mg (has no administration in time range)       Diagnostic Studies  Results Reviewed     None                 XR ankle 3+ views LEFT    (Results Pending)   XR knee 4+ views left injury    (Results Pending)   CT lower extremity wo contrast left    (Results Pending)         Procedures  Procedures      ED Course  ED Course as of Feb 25 1627   Thu Feb 25, 2021   1548 Ordered left lower extremity xray  IM Toradol for pain control  1606 Suspect tibial plateau fracture  Will get CT of LLE for further evaluation  MDM    Disposition  Final diagnoses:   None     ED Disposition     None      Follow-up Information    None         Patient's Medications   Discharge Prescriptions    No medications on file     No discharge procedures on file      PDMP Review     None ED Provider  Attending physically available and evaluated Ally eL I managed the patient along with the ED Attending      Electronically Signed by         Jennifer Fitch DPM  02/25/21 2682

## 2021-03-04 ENCOUNTER — OFFICE VISIT (OUTPATIENT)
Dept: OBGYN CLINIC | Facility: MEDICAL CENTER | Age: 66
End: 2021-03-04
Payer: COMMERCIAL

## 2021-03-04 ENCOUNTER — TELEPHONE (OUTPATIENT)
Dept: OBGYN CLINIC | Facility: CLINIC | Age: 66
End: 2021-03-04

## 2021-03-04 ENCOUNTER — APPOINTMENT (OUTPATIENT)
Dept: RADIOLOGY | Facility: MEDICAL CENTER | Age: 66
End: 2021-03-04
Payer: COMMERCIAL

## 2021-03-04 VITALS
BODY MASS INDEX: 44.2 KG/M2 | HEIGHT: 66 IN | TEMPERATURE: 98.4 F | DIASTOLIC BLOOD PRESSURE: 93 MMHG | HEART RATE: 71 BPM | SYSTOLIC BLOOD PRESSURE: 170 MMHG | WEIGHT: 275 LBS

## 2021-03-04 DIAGNOSIS — M25.562 LEFT KNEE PAIN, UNSPECIFIED CHRONICITY: ICD-10-CM

## 2021-03-04 DIAGNOSIS — S72.415A CLOSED NONDISPLACED FRACTURE OF CONDYLE OF LEFT FEMUR, INITIAL ENCOUNTER (HCC): Primary | ICD-10-CM

## 2021-03-04 PROCEDURE — 99204 OFFICE O/P NEW MOD 45 MIN: CPT | Performed by: ORTHOPAEDIC SURGERY

## 2021-03-04 PROCEDURE — 73564 X-RAY EXAM KNEE 4 OR MORE: CPT

## 2021-03-04 RX ORDER — DICLOFENAC SODIUM 75 MG/1
75 TABLET, DELAYED RELEASE ORAL 2 TIMES DAILY
Qty: 60 TABLET | Refills: 1 | Status: SHIPPED | OUTPATIENT
Start: 2021-03-04 | End: 2021-04-22 | Stop reason: SDUPTHER

## 2021-03-04 RX ORDER — DICLOFENAC SODIUM 75 MG/1
75 TABLET, DELAYED RELEASE ORAL 2 TIMES DAILY
Qty: 60 TABLET | Refills: 0 | Status: CANCELLED | OUTPATIENT
Start: 2021-03-04

## 2021-03-04 NOTE — LETTER
March 4, 2021     Patient: Nabeel Gill   YOB: 1955   Date of Visit: 3/4/2021       To Whom it May Concern:    Nabeel Gill is under my professional care  She was seen in my office on 3/4/2021  She may return to work for sedentary duty, she must be allowed to wear brace and use walker while at work  We will re-evaluate in 3 weeks at her follow up evaluation  If you have any questions or concerns, please don't hesitate to call           Sincerely,          Sky Morales DO        CC: Nabeel Gill

## 2021-03-04 NOTE — PROGRESS NOTES
Assessment/Plan     1  Closed nondisplaced fracture of condyle of left femur, initial encounter ([facility] [address] )    2  Left knee pain, unspecified chronicity      Orders Placed This Encounter   Procedures    Durable Medical Equipment    Durable Medical Equipment    XR knee 4+ vw left injury       -  Patient was advised she may toe-touch weightbear with a walker and and unlocked T ROM, she should limit weight-bearing as much as possible  - she should continue to use ice and elevation  - she should use diclofenac as prescribed and may add in Tylenol up to 1000 mg at a time every 8 hours she should not exceed 3000 mg a day  - a new work note was provided to the patient today stating she should be sedentary duty only and she must be able to wear the brace and use the walker while at work  We will re-evaluate her work status at her follow-up appointment in 3 weeks  Return in about 3 weeks (around 3/25/2021)  I answered all of the patient's questions during the visit and provided education of the patient's condition during the visit  The patient verbalized understanding of the information given and agrees with the plan  This note was dictated using Network software  It may contain errors including improperly dictated words  Please contact physician directly for any questions  History of Present Illness   Chief complaint:   Chief Complaint   Patient presents with    Left Knee - Pain       HPI: Quinn Haas is a 73 y o  female that c/o left knee pain  She states on February 24, 2021 she slipped and fell on black ice  When she fell she states she landed directly on her left knee  She did present to the emergency department and next day where a CT was performed demonstrating a medial femoral condyle fracture  The emergency department provided her with a knee immobilizer and crutches and instructed to be nonweightbearing    She states at this time her pain at rest is well controlled however with any activity her pain does increase to 8/10  Due to the difficulty of using crutches she is partial weight-bearing  She denies any previous injuries to her left knee  She has been using ice and elevation for pain relief  She denies any further injury or trauma to her left knee  She denies any distal paresthesias  ROS:    See HPI for musculoskeletal review  All other systems reviewed are negative     Historical Information   Past Medical History:   Diagnosis Date    Anemia     Arthritis     [facility] cancer ([facility] )     Cancer ([facility] )     right breast    GERD (gastroesophageal reflux disease)     Hyperlipidemia     Hypertension     Stroke ([facility] )     TIA      Past Surgical History:   Procedure Laterality Date    BREAST SURGERY      IA MASTECTOMY,PARTIAL,  WITH AXILLARY LYMPHADENECTOMY Right 12/20/2018    Procedure: ULTRASOUND LOCALIZED PARTIAL MASTECTOMY W/SENTINEL NODE BIOPSY POSS AXILLARY DISSECTION;  Surgeon: Stuart Whittaker MD;  Location: [address] [place] [address];  Service: General    TUBAL LIGATION      US GUIDED BREAST BIOPSY RIGHT COMPLETE Right 11/23/2018     Social History   Social History     Substance and Sexual Activity   Alcohol Use No     Social History     Substance and Sexual Activity   Drug Use No     Social History     Tobacco Use   Smoking Status Never Smoker   Smokeless Tobacco Never Used   Tobacco Comment    NO TOBACCO USE     Family History:   Family History   Problem Relation Age of Onset    Colon cancer Mother 62    Hypertension Mother     Cancer Father     Pancreatic cancer Father 61    Hypertension Son     Hypertension Daughter        Current Outpatient Medications on File Prior to Visit   Medication Sig Dispense Refill    anastrozole (ARIMIDEX) 1 mg tablet TAKE 1 TABLET BY MOUTH ONCE DAILY   30 tablet 5    atorvastatin (LIPITOR) 40 mg tablet TAKE ONE TABLET BY MOUTH AT BEDTIME 30 tablet 11    b complex vitamins tablet Take 1 tablet by mouth daily      cyanocobalamin (VITAMIN B-12) 1,000 mcg tablet Take 1 tablet (1,000 mcg total) by mouth daily 90 tablet 1    ergocalciferol (VITAMIN D2) 50,000 units Take 1 capsule (50,000 Units total) by mouth once a week for 50 days 4 capsule 2    fluticasone (FLONASE) 50 mcg/act nasal spray 2 sprays into each nostril daily 16 g 2    folic acid (FOLVITE) 1 mg tablet TAKE ONE TABLET BY MOUTH EVERY DAY 30 tablet 11    ibuprofen (MOTRIN) 600 mg tablet Take 1 tablet (600 mg total) by mouth every 6 (six) hours as needed for mild pain or moderate pain 30 tablet 0    losartan (COZAAR) 50 mg tablet Take 1 tablet (50 mg total) by mouth daily 90 tablet 1     No current facility-administered medications on file prior to visit  No Known Allergies    Current Outpatient Medications on File Prior to Visit   Medication Sig Dispense Refill    anastrozole (ARIMIDEX) 1 mg tablet TAKE 1 TABLET BY MOUTH ONCE DAILY  30 tablet 5    atorvastatin (LIPITOR) 40 mg tablet TAKE ONE TABLET BY MOUTH AT BEDTIME 30 tablet 11    b complex vitamins tablet Take 1 tablet by mouth daily      cyanocobalamin (VITAMIN B-12) 1,000 mcg tablet Take 1 tablet (1,000 mcg total) by mouth daily 90 tablet 1    ergocalciferol (VITAMIN D2) 50,000 units Take 1 capsule (50,000 Units total) by mouth once a week for 50 days 4 capsule 2    fluticasone (FLONASE) 50 mcg/act nasal spray 2 sprays into each nostril daily 16 g 2    folic acid (FOLVITE) 1 mg tablet TAKE ONE TABLET BY MOUTH EVERY DAY 30 tablet 11    ibuprofen (MOTRIN) 600 mg tablet Take 1 tablet (600 mg total) by mouth every 6 (six) hours as needed for mild pain or moderate pain 30 tablet 0    losartan (COZAAR) 50 mg tablet Take 1 tablet (50 mg total) by mouth daily 90 tablet 1     No current facility-administered medications on file prior to visit  Objective   Vitals: Blood pressure 170/93, pulse 71, temperature 98 4 °F (36 9 °C), height 5' 6" (1 676 m), weight 125 kg (275 lb), not currently breastfeeding  ,Body mass index is 44 39 kg/m²  PE:  AAOx 3  WDWN  Hearing intact, no drainage from eyes  Regular rate  no audible wheezing  no abdominal distension  LE compartments soft, skin intact    leftknee:    Appearance: Moderate swelling   No ecchymosis  no obvious joint deformity   Trace effusion  Palpation/Tenderness:  +TTP over medial joint line, medial femoral condyle, proximal medial tibia  No TTP over lateral joint line   No TTP over patella  No TTP over patellar tendon  No TTP over pes anserine bursa  Active Range of Motion:  AROM: 10-60    left LE  AT/GS intact    Physical exam limited due to pain    Imaging Studies: I have personally reviewed pertinent reports  XR  Left knee:   Demonstrating nondisplaced medial femoral condyle fracture

## 2021-03-04 NOTE — TELEPHONE ENCOUNTER
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

## 2021-03-25 ENCOUNTER — OFFICE VISIT (OUTPATIENT)
Dept: OBGYN CLINIC | Facility: MEDICAL CENTER | Age: 66
End: 2021-03-25
Payer: COMMERCIAL

## 2021-03-25 ENCOUNTER — APPOINTMENT (OUTPATIENT)
Dept: RADIOLOGY | Facility: MEDICAL CENTER | Age: 66
End: 2021-03-25
Payer: COMMERCIAL

## 2021-03-25 VITALS
TEMPERATURE: 97.8 F | HEART RATE: 84 BPM | BODY MASS INDEX: 45.03 KG/M2 | WEIGHT: 280.2 LBS | HEIGHT: 66 IN | SYSTOLIC BLOOD PRESSURE: 142 MMHG | DIASTOLIC BLOOD PRESSURE: 87 MMHG

## 2021-03-25 DIAGNOSIS — S72.415A CLOSED NONDISPLACED FRACTURE OF CONDYLE OF LEFT FEMUR, INITIAL ENCOUNTER (HCC): Primary | ICD-10-CM

## 2021-03-25 DIAGNOSIS — S72.415A CLOSED NONDISPLACED FRACTURE OF CONDYLE OF LEFT FEMUR, INITIAL ENCOUNTER (HCC): ICD-10-CM

## 2021-03-25 PROCEDURE — 73564 X-RAY EXAM KNEE 4 OR MORE: CPT

## 2021-03-25 PROCEDURE — 99213 OFFICE O/P EST LOW 20 MIN: CPT | Performed by: ORTHOPAEDIC SURGERY

## 2021-03-25 NOTE — PROGRESS NOTES
Assessment/Plan:  1  Closed nondisplaced fracture of condyle of left femur, initial encounter ([facility] [address] )      Orders Placed This Encounter   Procedures    Cane    XR knee 4+ vw left injury    Ambulatory referral to Physical Therapy       · Patient may start protective WBAT Left LE in TROM brace unlock with use of a walker for 3 more weeks and then transition to cane  · Continue taking Diclofenac 75 mg and Tylenol 1000 mg   · Physical therapy order was given out today for the left knee: start next week  · Advised patient to do gentle ROM exercises with brace on and straight leg raises   · Provided patient with a single-point cane      Return in about 4 weeks (around 4/22/2021) for Recheck left medial femoral condyle fracuture   I answered all of the patient's questions during the visit and provided education of the patient's condition during the visit  The patient verbalized understanding of the information given and agrees with the plan  This note was dictated using [employer] software  It may contain errors including improperly dictated words  Please contact physician directly for any questions  Subjective   Chief Complaint:   Chief Complaint   Patient presents with    Left Knee - Fracture, Follow-up       HPI  Quinn Haas is a 73 y o  female who presents for follow up for left knee nondisplaced medial femoral condyle fracture due to a slip and fall on ice  2/24/21  She have been toe touch weightbearing left lower extremity in a T ROM brace on a in use of a crutch  She states the pain is little bit better  She is having an achy, soreness  and stiffness that comes and goes over the anterior medial aspect of the left knee  She states pain is worse with movement  She does removed the brace daily for skin check and hygiene  She is taking Diclofenac 75 mg and Tylenol 1000 mg as needed for pain with relief  Review of Systems   ROS:    See HPI for musculoskeletal review     All other systems reviewed are negative     History:  Past Medical History:   Diagnosis Date    Anemia     Arthritis     Breast cancer ([facility] [address] )     Cancer ([facility] [address] )     right breast    GERD (gastroesophageal reflux disease)     Hyperlipidemia     Hypertension     Stroke ([facility] [address] )     TIA      Past Surgical History:   Procedure Laterality Date    BREAST SURGERY      RI MASTECTOMY,PARTIAL,  WITH AXILLARY LYMPHADENECTOMY Right 12/20/2018    Procedure: ULTRASOUND LOCALIZED PARTIAL MASTECTOMY W/SENTINEL NODE BIOPSY POSS AXILLARY DISSECTION;  Surgeon: Shi Khalil MD;  Location: [address] [place] [address] OR;  Service: General    TUBAL LIGATION      US GUIDED BREAST BIOPSY RIGHT COMPLETE Right 11/23/2018     Social History   Social History     Substance and Sexual Activity   Alcohol Use No     Social History     Substance and Sexual Activity   Drug Use No     Social History     Tobacco Use   Smoking Status Never Smoker   Smokeless Tobacco Never Used   Tobacco Comment    NO TOBACCO USE     Family History:   Family History   Problem Relation Age of Onset    Colon cancer Mother 62    Hypertension Mother     Cancer Father     Pancreatic cancer Father 61    Hypertension Son     Hypertension Daughter        Current Outpatient Medications on File Prior to Visit   Medication Sig Dispense Refill    anastrozole (ARIMIDEX) 1 mg tablet TAKE 1 TABLET BY MOUTH ONCE DAILY   30 tablet 5    atorvastatin (LIPITOR) 40 mg tablet TAKE ONE TABLET BY MOUTH AT BEDTIME 30 tablet 11    b complex vitamins tablet Take 1 tablet by mouth daily      cyanocobalamin (VITAMIN B-12) 1,000 mcg tablet Take 1 tablet (1,000 mcg total) by mouth daily 90 tablet 1    diclofenac (VOLTAREN) 75 mg EC tablet Take 1 tablet (75 mg total) by mouth 2 (two) times a day 2 times a day prn for pain 60 tablet 1    ergocalciferol (VITAMIN D2) 50,000 units Take 1 capsule (50,000 Units total) by mouth once a week for 50 days 4 capsule 2    fluticasone (FLONASE) 50 mcg/act nasal spray 2 sprays into each nostril daily 16 g 2    folic acid (FOLVITE) 1 mg tablet TAKE ONE TABLET BY MOUTH EVERY DAY 30 tablet 11    ibuprofen (MOTRIN) 600 mg tablet Take 1 tablet (600 mg total) by mouth every 6 (six) hours as needed for mild pain or moderate pain 30 tablet 0    losartan (COZAAR) 50 mg tablet Take 1 tablet (50 mg total) by mouth daily 90 tablet 1     No current facility-administered medications on file prior to visit        No Known Allergies     Objective     /87   Pulse 84   Temp 97 8 °F (36 6 °C)   Ht 5' 6" (1 676 m)   Wt 127 kg (280 lb 3 2 oz)   BMI 45 23 kg/m²      PE:  AAOx 3  WDWN  Hearing intact, no drainage from eyes  no audible wheezing  no abdominal distension  LE compartments soft, skin intact    Ortho Exam:  left Knee:   No erythema  Mild generalized swelling  no effusion   no warmth  AROM: 0-90  +TTP over medial joint line, medial femoral condyle, proximal medial tibia    Imaging Studies: I have personally reviewed pertinent films in PACS  XR left knee: R knee medial femoral condyle fracture, fracture alignment maintained        Scribe Attestation    I,:  Delmar Velasquez am acting as a scribe while in the presence of the attending physician :       I,:  Ralf Antunez DO personally performed the services described in this documentation    as scribed in my presence :

## 2021-04-14 ENCOUNTER — EVALUATION (OUTPATIENT)
Dept: PHYSICAL THERAPY | Facility: CLINIC | Age: 66
End: 2021-04-14
Payer: COMMERCIAL

## 2021-04-14 DIAGNOSIS — S72.415A CLOSED NONDISPLACED FRACTURE OF CONDYLE OF LEFT FEMUR, INITIAL ENCOUNTER (HCC): Primary | ICD-10-CM

## 2021-04-14 PROCEDURE — 97161 PT EVAL LOW COMPLEX 20 MIN: CPT

## 2021-04-19 ENCOUNTER — OFFICE VISIT (OUTPATIENT)
Dept: PHYSICAL THERAPY | Facility: CLINIC | Age: 66
End: 2021-04-19
Payer: COMMERCIAL

## 2021-04-19 DIAGNOSIS — S72.415A CLOSED NONDISPLACED FRACTURE OF CONDYLE OF LEFT FEMUR, INITIAL ENCOUNTER (HCC): Primary | ICD-10-CM

## 2021-04-19 PROCEDURE — 97112 NEUROMUSCULAR REEDUCATION: CPT

## 2021-04-19 PROCEDURE — 97530 THERAPEUTIC ACTIVITIES: CPT

## 2021-04-19 PROCEDURE — 97110 THERAPEUTIC EXERCISES: CPT

## 2021-04-19 NOTE — PROGRESS NOTES
Daily Note     Today's date: 2021  Patient name: Wan Kulkarni  : 1955  MRN: [id number]  Referring provider: Livia Mcgee DO  Dx:   Encounter Diagnosis     ICD-10-CM    1  Closed nondisplaced fracture of condyle of left femur, initial encounter ([facility] )  [id number]                   Subjective: Pt reports some pain thru the (L) anterior knee this AM  Pt states that she tries to perform SLR at home but has difficulty d/t pain  Objective: See treatment diary below      Assessment: Tolerated treatment well  Pt with difficulty performing SLR with brace this AM d/t anterior knee pain and weakness  Therefore PT assisted pt with concentric lifting portion while pt perform eccentric lowering with improved tolerance  Upon assessment of patellar mobility, pt demonstrated mod hypomobility in all directions which was painful however she reported no change in anterior knee pain after doing so  Pt showed a good tolerance to WB during Biodex weight shifts and was able to place 100% of weight thru the leg with min increased soreness  Post tx session, pt reported slight inc soreness thru the knee  Educated pt today about goals of exercises; DOMs; balance strategies; use of ice for soreness/pain modulation; and goals of weight shifting  Patient demonstrated fatigue post treatment, exhibited good technique with therapeutic exercises and would benefit from continued PT to progress lower quarter stability and WB tolerance to improve pt's tolerance to walking daily with the LRAD  Plan: Continue per plan of care  Progress treatment as tolerated  Precautions: Hx of (L) femoral condyle fracture 21; WBAT, exercise focus on ROM with brace on, progress as tolerated; no weights until 10 weeks after injury per MD  HTN; hyperlipidemia; hx breast cancer      Date            FOTO              Re-eval                 Manuals            (L) Patellar mobs  AL Neuro Re-Ed 4/14 4/19           Semi tandem stance on foam nv 2x20" ea           Biodex weight shifts nv 4'            Biodex LOS             PRAVEEN**             Saran negotiation nv                                      Ther Ex 4/14 4/19           Heel prop nv            Supine gastroc (S) w/ strap nv            Heelslides nv            SLR with brace nv 2x5 with PT assist for lifting           Bridges**             Sidelying hip abd**             Clams**             Prone quad (S)*             Ankle pumps elevated nv 30x with wedge           Seated HS (S) with stool nv 5x20"           LAQs  20x           3 way hip, standing nv            Standing HR nv 20x           Runners gastroc (S) nv 5x20"                        Ther Activity 4/14 4/19           Recumbent bike rocking nv 6'            Sidesteps             Mini squats, pain free range             Step ups*             Pt Edu  AL                        Gait Training 4/14 4/19           [facility]                          Modalities 4/14 4/19           Ice - prn

## 2021-04-22 ENCOUNTER — APPOINTMENT (OUTPATIENT)
Dept: RADIOLOGY | Facility: MEDICAL CENTER | Age: 66
End: 2021-04-22
Payer: COMMERCIAL

## 2021-04-22 ENCOUNTER — OFFICE VISIT (OUTPATIENT)
Dept: OBGYN CLINIC | Facility: MEDICAL CENTER | Age: 66
End: 2021-04-22
Payer: COMMERCIAL

## 2021-04-22 VITALS — DIASTOLIC BLOOD PRESSURE: 86 MMHG | SYSTOLIC BLOOD PRESSURE: 147 MMHG | TEMPERATURE: 97.5 F | HEART RATE: 88 BPM

## 2021-04-22 DIAGNOSIS — S72.415A CLOSED NONDISPLACED FRACTURE OF CONDYLE OF LEFT FEMUR, INITIAL ENCOUNTER (HCC): ICD-10-CM

## 2021-04-22 DIAGNOSIS — M25.562 LEFT KNEE PAIN, UNSPECIFIED CHRONICITY: ICD-10-CM

## 2021-04-22 DIAGNOSIS — S72.415A CLOSED NONDISPLACED FRACTURE OF CONDYLE OF LEFT FEMUR, INITIAL ENCOUNTER (HCC): Primary | ICD-10-CM

## 2021-04-22 PROCEDURE — 73564 X-RAY EXAM KNEE 4 OR MORE: CPT

## 2021-04-22 PROCEDURE — 99213 OFFICE O/P EST LOW 20 MIN: CPT | Performed by: ORTHOPAEDIC SURGERY

## 2021-04-22 RX ORDER — DICLOFENAC SODIUM 75 MG/1
75 TABLET, DELAYED RELEASE ORAL 2 TIMES DAILY
Qty: 60 TABLET | Refills: 1 | Status: SHIPPED | OUTPATIENT
Start: 2021-04-22 | End: 2021-06-10 | Stop reason: SDUPTHER

## 2021-04-22 NOTE — PROGRESS NOTES
Assessment/Plan:  1  Closed nondisplaced fracture of condyle of left femur, initial encounter ([facility] )    2  Left knee pain, unspecified chronicity      Orders Placed This Encounter   Procedures    XR knee 4+ vw left injury       · X-rays of the left knee demonstrate medial femoral condyle fracture without changes compared to previous x-rays  · Continue TROM for 2 more weeks then may wean out of brace  · WBAT to LLE  · Continue PT  PT may add weights in 2 more weeks when patient is 10 weeks out from injury  · Continue diclofenac tabs as needed for pain  Refill provided  Patient aware to avoid other NSAIDs while taking diclofenac  Return in about 4 weeks (around 5/20/2021)  I answered all of the patient's questions during the visit and provided education of the patient's condition during the visit  The patient verbalized understanding of the information given and agrees with the plan  This note was dictated using [employer] software  It may contain errors including improperly dictated words  Please contact physician directly for any questions  Subjective   Chief Complaint:   Chief Complaint   Patient presents with    Left Leg - Fracture, Follow-up       HPI  Ana Cantu is a 73 y o  female who presents for follow up for left medial femoral condyle fracture, sustained on 2/24/21  Patient notes she is feeling slightly better today  She does still have pain on the medial aspect of the knee and over the patellar tendon  She has been taking diclofenac prn pain  Patient is attending PT and has been doing exercises for her knee and leg  Patient states she is sore after her sessions  She has transitioned to the cane  Denies distal numbness or tingling  Review of Systems  ROS:    See HPI for musculoskeletal review     All other systems reviewed are negative     History:  Past Medical History:   Diagnosis Date    Anemia     Arthritis     Breast cancer ([facility] )     Cancer ([facility] )     right breast    GERD (gastroesophageal reflux disease)     Hyperlipidemia     Hypertension     Stroke ([facility] [address] )     TIA      Past Surgical History:   Procedure Laterality Date    BREAST SURGERY      RI MASTECTOMY,PARTIAL,  WITH AXILLARY LYMPHADENECTOMY Right 12/20/2018    Procedure: ULTRASOUND LOCALIZED PARTIAL MASTECTOMY W/SENTINEL NODE BIOPSY POSS AXILLARY DISSECTION;  Surgeon: Lamont Hoyt MD;  Location:  MAIN OR;  Service: General    TUBAL LIGATION      US GUIDED BREAST BIOPSY RIGHT COMPLETE Right 11/23/2018     Social History   Social History     Substance and Sexual Activity   Alcohol Use No     Social History     Substance and Sexual Activity   Drug Use No     Social History     Tobacco Use   Smoking Status Never Smoker   Smokeless Tobacco Never Used   Tobacco Comment    NO TOBACCO USE     Family History:   Family History   Problem Relation Age of Onset    Colon cancer Mother 62    Hypertension Mother     Cancer Father     Pancreatic cancer Father 61    Hypertension Son     Hypertension Daughter        Current Outpatient Medications on File Prior to Visit   Medication Sig Dispense Refill    anastrozole (ARIMIDEX) 1 mg tablet TAKE 1 TABLET BY MOUTH ONCE DAILY   30 tablet 5    atorvastatin (LIPITOR) 40 mg tablet TAKE ONE TABLET BY MOUTH AT BEDTIME 30 tablet 11    b complex vitamins tablet Take 1 tablet by mouth daily      cyanocobalamin (VITAMIN B-12) 1,000 mcg tablet Take 1 tablet (1,000 mcg total) by mouth daily 90 tablet 1    ergocalciferol (VITAMIN D2) 50,000 units Take 1 capsule (50,000 Units total) by mouth once a week for 50 days 4 capsule 2    fluticasone (FLONASE) 50 mcg/act nasal spray 2 sprays into each nostril daily 16 g 2    folic acid (FOLVITE) 1 mg tablet TAKE ONE TABLET BY MOUTH EVERY DAY 30 tablet 11    ibuprofen (MOTRIN) 600 mg tablet Take 1 tablet (600 mg total) by mouth every 6 (six) hours as needed for mild pain or moderate pain 30 tablet 0    losartan (COZAAR) 50 mg tablet Take 1 tablet (50 mg total) by mouth daily 90 tablet 1    [DISCONTINUED] diclofenac (VOLTAREN) 75 mg EC tablet Take 1 tablet (75 mg total) by mouth 2 (two) times a day 2 times a day prn for pain 60 tablet 1     No current facility-administered medications on file prior to visit        No Known Allergies     Objective     /86   Pulse 88   Temp 97 5 °F (36 4 °C)      PE:  AAOx 3  WDWN  Hearing intact, no drainage from eyes  no audible wheezing  no abdominal distension  LE compartments soft, skin intact    Ortho Exam:  left Knee:   No erythema  no swelling  no effusion  no warmth  +TTP over medial and lateral joint lines  AROM: 0- 95  Stable to varus/valgus stress    Imaging Studies: I have personally reviewed pertinent films in PACS  X-rays left knee: medial femoral condyle fracture, no change compared to previous x-ray

## 2021-04-26 ENCOUNTER — APPOINTMENT (OUTPATIENT)
Dept: PHYSICAL THERAPY | Facility: CLINIC | Age: 66
End: 2021-04-26
Payer: COMMERCIAL

## 2021-05-10 ENCOUNTER — OFFICE VISIT (OUTPATIENT)
Dept: PHYSICAL THERAPY | Facility: CLINIC | Age: 66
End: 2021-05-10
Payer: COMMERCIAL

## 2021-05-10 DIAGNOSIS — S72.415A CLOSED NONDISPLACED FRACTURE OF CONDYLE OF LEFT FEMUR, INITIAL ENCOUNTER (HCC): Primary | ICD-10-CM

## 2021-05-10 PROCEDURE — 97110 THERAPEUTIC EXERCISES: CPT

## 2021-05-10 PROCEDURE — 97530 THERAPEUTIC ACTIVITIES: CPT

## 2021-05-10 NOTE — PROGRESS NOTES
Daily Note     Today's date: 5/10/2021  Patient name: Wan Kulkarni  : 1955  MRN: [id number]  Referring provider: Livia Mcgee DO  Dx:   Encounter Diagnosis     ICD-10-CM    1  Closed nondisplaced fracture of condyle of left femur, initial encounter ([facility] [address] )  [id number]        Start Time: 1200  Stop Time: 1243  Total time in clinic (min): 43 minutes  Subjective:  Pt arrives to today's Tx noting her (L) knee is feeling "better" since getting her brace off  Pt reports her (L) knee still bothers her at night when she is sleeping; however, denies AM stiffness  Pt notes ortho DC'd her brace at most recent f/u  Pt notes she uses CP at home if her knee is hurting"really bad or swollen"  Objective: See treatment diary below    Assessment: Pt appropriately challenged /c additions to exercise diary today - requiring only initial cuing for form /c good carryover noted  Pt notes performing SLR /c increased ease today 2/ no longer wearing knee brace  Provided pt education re: DOMS and progression of progam as tolerated  Pt demonstrated fatigue post treatment, exhibited good technique with therapeutic exercises and would benefit from continued PT to progress lower quarter stability and WB tolerance to improve pt's tolerance to walking daily with the LRAD  Plan: Cont /c PT POC  Progress as tolerated  Precautions: Hx of (L) femoral condyle fracture 21; WBAT, exercise focus on ROM with brace on, progress as tolerated; no weights until 10 weeks after injury per MD  HTN; hyperlipidemia; hx breast cancer      Date 4/14 4/19 05/10          FOTO              Re-eval                 Manuals 4/14 4/19 05/10          (L) Patellar mobs  AL                                                  Neuro Re-Ed 4/14 4/19 05/10          Semi tandem stance on foam nv 2x20" ea           Biodex weight shifts nv 4'            Biodex LOS             PRAVEEN**             Saran negotiation nv Ther Ex 4/14 4/19 05/10          Heel prop nv            Supine gastroc (S) w/ strap nv            Heelslides nv            SLR with brace nv 2x5 with PT assist for lifting 2x10ea no brace          Bridges**   2x10          Sidelying hip abd**   2x10ea          Clams**   2x10ea          Prone quad (S)*             Ankle pumps elevated nv 30x with wedge 30x /c wedge          Seated HS (S) with stool nv 5x20"           LAQs  20x           3 way hip, standing nv  10ea (B/L) foam          Standing HR nv 20x 30x          Runners gastroc (S) nv 5x20"                        Ther Activity 4/14 4/19 05/10          Recumbent bike ROM nv 6'  8'           Sidesteps             Mini squats, pain free range             Step ups*             Pt Edu  AL                        Gait Training 4/14 4/19 05/10          [facility]                          Modalities 4/14 4/19 05/10          Ice - prn                              Maximo Lozada, PTA

## 2021-05-17 ENCOUNTER — OFFICE VISIT (OUTPATIENT)
Dept: PHYSICAL THERAPY | Facility: CLINIC | Age: 66
End: 2021-05-17
Payer: COMMERCIAL

## 2021-05-17 DIAGNOSIS — S72.415A CLOSED NONDISPLACED FRACTURE OF CONDYLE OF LEFT FEMUR, INITIAL ENCOUNTER (HCC): Primary | ICD-10-CM

## 2021-05-17 PROCEDURE — 97110 THERAPEUTIC EXERCISES: CPT

## 2021-05-17 PROCEDURE — 97530 THERAPEUTIC ACTIVITIES: CPT

## 2021-05-17 NOTE — PROGRESS NOTES
Daily Note     Today's date: 2021  Patient name: Saadia Robles  : 1955  MRN: [id number]  Referring provider: Isaak Jackson DO  Dx:   Encounter Diagnosis     ICD-10-CM    1  Closed nondisplaced fracture of condyle of left femur, initial encounter [facility])  [id number]        Start Time: 5891  Stop Time: 1255  Total time in clinic (min): 48 minutes  Subjective:  Pt reports DOMS following LV that lasted approx 36 hrs - notes the pain woke her in the middle of the night the day after PT  Pt ambulates /c SPC in to today's appt  Pt notes occasional sleep disturbances 2/2 (L) hip pain Sx - notes these have decreased frequency when she sleeps /c a pillow under her leg  Pt reports HEP compliance  Objective: See treatment diary below    Assessment:  Provided pt education re: DOMS and progressing program as tolerated  Pt able to increase vertical height during standing HR /c VC'ing - notes increased muscle activitiy in (B/L)LE when doing so  Pt misherad instructions and performed increased reps of standing hip 3-ways - pt was able to perform these without c/o increased pain and demonstrates significant improvement in exercise since LV  Pt demonstrated fatigue post treatment, exhibited good technique with therapeutic exercises and would benefit from continued PT to progress lower quarter stability and WB tolerance to improve pt's tolerance to walking daily with the LRAD  Plan: Cont /c PT POC  Progress as tolerated  Precautions: Hx of (L) femoral condyle fracture 21; WBAT, exercise focus on ROM with brace on, progress as tolerated; no weights until 10 weeks after injury per MD  HTN; hyperlipidemia; hx breast cancer      Date 4/14 4/19 05/10 05/17         FOTO              Re-eval                 Manuals 4/14 4/19 05/10 05/17         (L) Patellar mobs  AL                                                  Neuro Re-Ed 4/14 4/19 05/10 05/17         Semi tandem stance on foam nv 2x20" ea Biodex weight shifts nv 4'            Biodex LOS             PRAVEEN**             Saran negotiation nv                                      Ther Ex 4/14 4/19 05/10 05/17         Heel prop nv            Supine gastroc (S) w/ strap nv            Heelslides nv            SLR with brace nv 2x5 with PT assist for lifting 2x10ea no brace 2x10ea no brace         Bridges**   2x10 2x10         Sidelying hip abd**   2x10ea          Clams**   2x10ea          Prone quad (S)*             Ankle pumps elevated nv 30x with wedge 30x /c wedge          Seated HS (S) with stool nv 5x20"           LAQs  20x           3 way hip, standing nv  10ea (B/L) foam 2x15ea (B/L) foam         Standing HR nv 20x 30x 30x         Runners gastroc (S) nv 5x20"  20"x5 wedge                      Ther Activity 4/14 4/19 05/10 05/17         Recumbent bike ROM nv 6'  8'  8'          Sidesteps             Mini squats, pain free range             Step ups*             Pt Edu  AL                        Gait Training 4/14 4/19 05/10 05/17         [facility]                          Modalities 4/14 4/19 05/10 05/17         Ice - praida Noriega, PTA

## 2021-05-19 ENCOUNTER — APPOINTMENT (OUTPATIENT)
Dept: RADIOLOGY | Facility: MEDICAL CENTER | Age: 66
End: 2021-05-19
Payer: COMMERCIAL

## 2021-05-19 ENCOUNTER — OFFICE VISIT (OUTPATIENT)
Dept: OBGYN CLINIC | Facility: MEDICAL CENTER | Age: 66
End: 2021-05-19
Payer: COMMERCIAL

## 2021-05-19 VITALS
HEART RATE: 84 BPM | DIASTOLIC BLOOD PRESSURE: 78 MMHG | HEIGHT: 66 IN | WEIGHT: 278 LBS | SYSTOLIC BLOOD PRESSURE: 130 MMHG | TEMPERATURE: 97.5 F | BODY MASS INDEX: 44.68 KG/M2

## 2021-05-19 DIAGNOSIS — S72.415A CLOSED NONDISPLACED FRACTURE OF CONDYLE OF LEFT FEMUR, INITIAL ENCOUNTER (HCC): ICD-10-CM

## 2021-05-19 DIAGNOSIS — S72.415A CLOSED NONDISPLACED FRACTURE OF CONDYLE OF LEFT FEMUR, INITIAL ENCOUNTER (HCC): Primary | ICD-10-CM

## 2021-05-19 PROCEDURE — 73564 X-RAY EXAM KNEE 4 OR MORE: CPT

## 2021-05-19 PROCEDURE — 99213 OFFICE O/P EST LOW 20 MIN: CPT | Performed by: ORTHOPAEDIC SURGERY

## 2021-05-19 NOTE — LETTER
May 19, 2021     Patient: Vilma Padron   YOB: 1955   Date of Visit: 5/19/2021       To Whom it May Concern:    Vilma Padron is under my professional care  She was seen in my office on 5/19/2021  Patient may return to work as of Monday 5/24/2021 on light duty with the following restrictions: no lifting >25 lbs, and limited bending, kneeling, and squatting  Patient will be re-evaluated in 3 weeks  If you have any questions or concerns, please don't hesitate to call           Sincerely,          Noe Suggs DO        CC: Vilma Padron

## 2021-06-10 ENCOUNTER — APPOINTMENT (OUTPATIENT)
Dept: RADIOLOGY | Facility: MEDICAL CENTER | Age: 66
End: 2021-06-10
Payer: COMMERCIAL

## 2021-06-10 ENCOUNTER — OFFICE VISIT (OUTPATIENT)
Dept: OBGYN CLINIC | Facility: MEDICAL CENTER | Age: 66
End: 2021-06-10
Payer: COMMERCIAL

## 2021-06-10 VITALS
SYSTOLIC BLOOD PRESSURE: 132 MMHG | HEART RATE: 80 BPM | WEIGHT: 278 LBS | TEMPERATURE: 97.7 F | DIASTOLIC BLOOD PRESSURE: 78 MMHG | BODY MASS INDEX: 44.87 KG/M2

## 2021-06-10 DIAGNOSIS — S72.415A CLOSED NONDISPLACED FRACTURE OF CONDYLE OF LEFT FEMUR, INITIAL ENCOUNTER (HCC): Primary | ICD-10-CM

## 2021-06-10 DIAGNOSIS — S72.415A CLOSED NONDISPLACED FRACTURE OF CONDYLE OF LEFT FEMUR, INITIAL ENCOUNTER (HCC): ICD-10-CM

## 2021-06-10 DIAGNOSIS — M25.562 LEFT KNEE PAIN, UNSPECIFIED CHRONICITY: ICD-10-CM

## 2021-06-10 PROCEDURE — 73564 X-RAY EXAM KNEE 4 OR MORE: CPT

## 2021-06-10 PROCEDURE — 99213 OFFICE O/P EST LOW 20 MIN: CPT | Performed by: ORTHOPAEDIC SURGERY

## 2021-06-10 RX ORDER — DICLOFENAC SODIUM 75 MG/1
75 TABLET, DELAYED RELEASE ORAL 2 TIMES DAILY PRN
Qty: 60 TABLET | Refills: 1 | Status: SHIPPED | OUTPATIENT
Start: 2021-06-10 | End: 2022-07-07

## 2021-06-10 NOTE — LETTER
Evelin 10, 2021     Patient: Zaire Mendez   YOB: 1955   Date of Visit: 6/10/2021       To Whom it May Concern:    Zaire Mendez is under my professional care  She was seen in my office on 6/10/2021  She may return to work as of 6/14/2021 with full duty no restrictions  If you have any questions or concerns, please don't hesitate to call           Sincerely,          Dina Torres DO        CC: Zaire Mendez

## 2021-06-10 NOTE — PROGRESS NOTES
Assessment/Plan:  1  Closed nondisplaced fracture of condyle of left femur, initial encounter ([facility] )    2  Left knee pain, unspecified chronicity      Orders Placed This Encounter   Procedures    XR knee 4+ vw left injury       · Patient with left knee medial femoral condyle fracture, well healed  · Patient may continue WBAT and activity as tolerated LLE  · Diclofenac refill provided  Aware to avoid other NSAIDs while taking diclofenac  · Encouraged home exercises  · Work note provided for return to full duty  Return if symptoms worsen or fail to improve  I answered all of the patient's questions during the visit and provided education of the patient's condition during the visit  The patient verbalized understanding of the information given and agrees with the plan  This note was dictated using [employer] software  It may contain errors including improperly dictated words  Please contact physician directly for any questions  Subjective   Chief Complaint:   Chief Complaint   Patient presents with    Left Leg - Fracture, Follow-up       HPI  Tia Hannah is a 73 y o  female who presents for follow up for left medial femoral condyle fracture, sustained on 2/24/21  Patient states she is doing well  She notes her left knee pain has continued to improve  She does still have some soreness over the medial knee at times  Patient is taking diclofenac prn pain with relief  Patient was not able to return to work because they did not have any jobs that allowed light duty  She is comfortable returning to work full duty  Review of Systems  ROS:    See HPI for musculoskeletal review     All other systems reviewed are negative     History:  Past Medical History:   Diagnosis Date    Anemia     Arthritis     Breast cancer ([facility] )     Cancer ([place] [address] )     right breast    GERD (gastroesophageal reflux disease)     Hyperlipidemia     Hypertension     Stroke ([place] [address] )     TIA      Past Surgical History: Procedure Laterality Date    BREAST SURGERY      CO MASTECTOMY,PARTIAL,  WITH AXILLARY LYMPHADENECTOMY Right 12/20/2018    Procedure: ULTRASOUND LOCALIZED PARTIAL MASTECTOMY W/SENTINEL NODE BIOPSY POSS AXILLARY DISSECTION;  Surgeon: Erasmo Goldman MD;  Location: [address] [place] [address] OR;  Service: General    TUBAL LIGATION      US GUIDED BREAST BIOPSY RIGHT COMPLETE Right 11/23/2018     Social History   Social History     Substance and Sexual Activity   Alcohol Use No     Social History     Substance and Sexual Activity   Drug Use No     Social History     Tobacco Use   Smoking Status Never Smoker   Smokeless Tobacco Never Used   Tobacco Comment    NO TOBACCO USE     Family History:   Family History   Problem Relation Age of Onset    Colon cancer Mother 62    Hypertension Mother     Cancer Father     Pancreatic cancer Father 61    Hypertension Son     Hypertension Daughter        Current Outpatient Medications on File Prior to Visit   Medication Sig Dispense Refill    anastrozole (ARIMIDEX) 1 mg tablet TAKE 1 TABLET BY MOUTH ONCE DAILY   30 tablet 5    atorvastatin (LIPITOR) 40 mg tablet TAKE ONE TABLET BY MOUTH AT BEDTIME 30 tablet 11    b complex vitamins tablet Take 1 tablet by mouth daily      cyanocobalamin (VITAMIN B-12) 1,000 mcg tablet Take 1 tablet (1,000 mcg total) by mouth daily 90 tablet 1    ergocalciferol (VITAMIN D2) 50,000 units Take 1 capsule (50,000 Units total) by mouth once a week for 50 days 4 capsule 2    fluticasone (FLONASE) 50 mcg/act nasal spray 2 sprays into each nostril daily 16 g 2    folic acid (FOLVITE) 1 mg tablet TAKE ONE TABLET BY MOUTH EVERY DAY 30 tablet 11    losartan (COZAAR) 50 mg tablet Take 1 tablet (50 mg total) by mouth daily 90 tablet 1    [DISCONTINUED] diclofenac (VOLTAREN) 75 mg EC tablet Take 1 tablet (75 mg total) by mouth 2 (two) times a day 2 times a day prn for pain 60 tablet 1    [DISCONTINUED] ibuprofen (MOTRIN) 600 mg tablet Take 1 tablet (600 mg total) by mouth every 6 (six) hours as needed for mild pain or moderate pain 30 tablet 0     No current facility-administered medications on file prior to visit        No Known Allergies     Objective     /78   Pulse 80   Temp 97 7 °F (36 5 °C)   Wt 126 kg (278 lb)   BMI 44 87 kg/m²      PE:  AAOx 3  WDWN  Hearing intact, no drainage from eyes  no audible wheezing  no abdominal distension  LE compartments soft, skin intact    Ortho Exam:  left Knee:   No erythema  no swelling  no effusion  no warmth  +TTP over medial knee  AROM: 0- 125  Stable to varus/valgus stress    Imaging Studies: I have personally reviewed pertinent films in PACS  X-rays left knee: displaced medial femoral condyle fracture, well healed with increased bone callus, maintained alignment compared to previous imaging

## 2021-07-14 DIAGNOSIS — C50.411 MALIGNANT NEOPLASM OF UPPER-OUTER QUADRANT OF RIGHT BREAST IN FEMALE, ESTROGEN RECEPTOR POSITIVE (HCC): ICD-10-CM

## 2021-07-14 DIAGNOSIS — Z86.2 HISTORY OF ANEMIA: ICD-10-CM

## 2021-07-14 DIAGNOSIS — E78.2 MIXED HYPERLIPIDEMIA: ICD-10-CM

## 2021-07-14 DIAGNOSIS — Z17.0 MALIGNANT NEOPLASM OF UPPER-OUTER QUADRANT OF RIGHT BREAST IN FEMALE, ESTROGEN RECEPTOR POSITIVE (HCC): ICD-10-CM

## 2021-07-14 RX ORDER — ANASTROZOLE 1 MG/1
TABLET ORAL
Qty: 90 TABLET | Refills: 3 | Status: SHIPPED | OUTPATIENT
Start: 2021-07-14 | End: 2022-07-07 | Stop reason: SDUPTHER

## 2021-07-14 RX ORDER — ATORVASTATIN CALCIUM 40 MG/1
TABLET, FILM COATED ORAL
Qty: 30 TABLET | Refills: 0 | Status: SHIPPED | OUTPATIENT
Start: 2021-07-14 | End: 2021-10-06 | Stop reason: SDUPTHER

## 2021-07-14 RX ORDER — FOLIC ACID 1 MG/1
TABLET ORAL
Qty: 30 TABLET | Refills: 0 | Status: SHIPPED | OUTPATIENT
Start: 2021-07-14 | End: 2021-10-06 | Stop reason: SDUPTHER

## 2021-08-16 ENCOUNTER — OFFICE VISIT (OUTPATIENT)
Dept: FAMILY MEDICINE CLINIC | Facility: CLINIC | Age: 66
End: 2021-08-16

## 2021-08-16 VITALS
HEART RATE: 90 BPM | OXYGEN SATURATION: 95 % | BODY MASS INDEX: 44.52 KG/M2 | WEIGHT: 277 LBS | HEIGHT: 66 IN | SYSTOLIC BLOOD PRESSURE: 160 MMHG | DIASTOLIC BLOOD PRESSURE: 90 MMHG | RESPIRATION RATE: 16 BRPM | TEMPERATURE: 96.6 F

## 2021-08-16 DIAGNOSIS — E66.01 CLASS 3 SEVERE OBESITY WITH BODY MASS INDEX (BMI) OF 40.0 TO 44.9 IN ADULT, UNSPECIFIED OBESITY TYPE, UNSPECIFIED WHETHER SERIOUS COMORBIDITY PRESENT (HCC): Primary | ICD-10-CM

## 2021-08-16 DIAGNOSIS — Z13.21 ENCOUNTER FOR VITAMIN DEFICIENCY SCREENING: ICD-10-CM

## 2021-08-16 DIAGNOSIS — Z12.11 ENCOUNTER FOR SCREENING COLONOSCOPY: ICD-10-CM

## 2021-08-16 DIAGNOSIS — Z13.820 SCREENING FOR OSTEOPOROSIS: ICD-10-CM

## 2021-08-16 DIAGNOSIS — Z12.31 SCREENING MAMMOGRAM, ENCOUNTER FOR: ICD-10-CM

## 2021-08-16 DIAGNOSIS — I10 BENIGN ESSENTIAL HYPERTENSION: ICD-10-CM

## 2021-08-16 PROCEDURE — 99213 OFFICE O/P EST LOW 20 MIN: CPT | Performed by: FAMILY MEDICINE

## 2021-08-16 RX ORDER — LOSARTAN POTASSIUM 50 MG/1
50 TABLET ORAL DAILY
Qty: 90 TABLET | Refills: 1 | Status: SHIPPED | OUTPATIENT
Start: 2021-08-16 | End: 2022-07-07 | Stop reason: SDUPTHER

## 2021-08-16 NOTE — PROGRESS NOTES
Assessment/Plan:    Benign essential hypertension  Not at goal   Goal of <150/90  Ran out medication a while ago  Provided refills on medication-Losartan 50mg  -Discussed Dash diet    Screen for colon cancer  -Ambulatory referral to General surgery for screening colonoscopy    Mixed hyperlipidemia  On atorvastatin 40 mg  -Recheck lipid panel    Vitamin D deficiency  -Recheck Vitamin D level      Return in about 4 weeks (around 9/13/2021) for Next scheduled follow up for HTN  Patient Instructions     DASH Eating Plan   WHAT YOU NEED TO KNOW:   The DASH (Dietary Approaches to Stop Hypertension) Eating Plan is designed to help prevent or lower high blood pressure  It can also help to lower LDL (bad) cholesterol and decrease your risk of heart disease  The plan is low in sodium, sugar, unhealthy fats, and total fat  It is high in potassium, calcium, magnesium, and fiber  These nutrients are added when you eat more fruits, vegetables, and whole grains  DISCHARGE INSTRUCTIONS:   Your sodium limit each day: Your dietitian will tell you how much sodium is safe for you to have each day  People with high blood pressure should have no more than 1,500 to 2,300 mg of sodium in a day  A teaspoon (tsp) of salt has 2,300 mg of sodium  This may seem like a difficult goal, but small changes to the foods you eat can make a big difference  Your healthcare provider or dietitian can help you create a meal plan that follows your sodium limit  How to limit sodium:   · Read food labels  Food labels can help you choose foods that are low in sodium  The amount of sodium is listed in milligrams (mg)  The % Daily Value (DV) column tells you how much of your daily needs are met by 1 serving of the food for each nutrient listed  Choose foods that have less than 5% of the DV of sodium  These foods are considered low in sodium  Foods that have 20% or more of the DV of sodium are considered high in sodium   Avoid foods that have more than 300 mg of sodium in each serving  Choose foods that say low-sodium, reduced-sodium, or no salt added on the food label  · Avoid salt  Do not salt food at the table, and add very little salt to foods during cooking  Use herbs and spices, such as onions, garlic, and salt-free seasonings to add flavor to foods  Try lemon or lime juice or vinegar to give foods a tart flavor  Use hot peppers or a small amount of hot pepper sauce to add a spicy flavor to foods  · Ask about salt substitutes  Ask your healthcare provider if you may use salt substitutes  Some salt substitutes have ingredients that can be harmful if you have certain health conditions  · Choose foods carefully at restaurants  Meals from restaurants, especially fast food restaurants, are often high in sodium  Some restaurants have nutrition information that tells you the amount of sodium in their foods  Ask to have your food prepared with less, or no salt  What you need to know about fats:   · Include healthy fats  Examples are unsaturated fats and omega-3 fatty acids  Unsaturated fats are found in soybean, canola, olive, or sunflower oil, and liquid and soft tub margarines  Omega-3 fatty acids are found in fatty fish, such as salmon, tuna, mackerel, and sardines  It is also found in flaxseed oil and ground flaxseed  · Avoid unhealthy fats  Do not eat unhealthy fats, such as saturated fats and trans fats  Saturated fats are found in foods that contain fat from animals  Examples are fatty meats, whole milk, butter, cream, and other dairy foods  It is also found in shortening, stick margarine, palm oil, and coconut oil  Trans fats are found in fried foods, crackers, chips, and baked goods made with margarine or shortening  Foods to include: With the DASH eating plan, you need to eat a certain number of servings from each food group  This will help you get enough of certain nutrients and limit others   The amount of servings you should eat depends on how many calories you need  Your dietitian can tell you how many calories you need  The number of servings listed next to the food groups below are for people who need about 2,000 calories each day  · Grains:  6 to 8 servings (3 of these servings should be whole-grain foods)    ? 1 slice of whole-grain bread    ? 1 ounce of dry cereal    ? ½ cup of cooked cereal, pasta, or brown rice    · Vegetables and fruits:  4 to 5 servings of fruits and 4 to 5 servings of vegetables    ? 1 medium fruit    ? ½ cup of frozen, canned (no added salt), or chopped fresh vegetables    ? ½ cup of fresh, frozen, dried, or canned fruit (canned in light syrup or fruit juice)    ? ½ cup of vegetable or fruit juice    · Dairy:  2 to 3 servings    ? 1 cup of nonfat (skim) or 1% milk    ? 1½ ounces of fat-free or low-fat cheese    ? 6 ounces of nonfat or low-fat yogurt    · Lean meat, poultry, and fish:  6 ounces or less    ? Poultry (chicken, turkey) with no skin    ? Fish (especially fatty fish, such as salmon, fresh tuna, or mackerel)    ? Lean beef and pork (loin, round, extra lean hamburger)    ? Egg whites and egg substitutes    · Nuts, seeds, and legumes:  4 to 5 servings each week    ? ½ cup of cooked beans and peas    ? 1½ ounces of unsalted nuts    ? 2 tablespoons of peanut butter or seeds    · Sweets and added sugars:  5 or less each week    ? 1 tablespoon of sugar, jelly, or jam    ? ½ cup of sorbet or gelatin    ? 1 cup of lemonade    · Fats:  2 to 3 servings each week    ? 1 teaspoon of soft margarine or vegetable oil    ? 1 tablespoon of mayonnaise    ? 2 tablespoons of salad dressing    Foods to avoid:   · Grains:      ? Baked goods, such as doughnuts, pastries, cookies, and biscuits (high in fat and sugar)    ?  Mixes for cornbread and biscuits, packaged foods, such as bread stuffing, rice and pasta mixes, macaroni and cheese, and instant cereals (high in sodium)    · Fruits and vegetables: ? Regular, canned vegetables (high in sodium)    ? Sauerkraut, pickled vegetables, and other foods prepared in brine (high in sodium)    ? Fried vegetables or vegetables in butter or high-fat sauces    ? Fruit in cream or butter sauce (high in fat)    · Dairy:      ? Whole milk, 2% milk, and cream (high in fat)    ? Regular cheese and processed cheese (high in fat and sodium)    · Meats and protein foods:      ? Smoked or cured meat, such as corned beef, spears, ham, hot dogs, and sausage (high in fat and sodium)    ? Canned beans and canned meats or spreads, such as potted meats, sardines, anchovies, and imitation seafood (high in sodium)    ? Deli or lunch meats, such as bologna, ham, turkey, and roast beef (high in sodium)    ? High-fat meat (T-bone steak, regular hamburger, and ribs)    ? Whole eggs and egg yolks (high in fat)    · Other:      ? Seasonings made with salt, such as garlic salt, celery salt, onion salt, seasoned salt, meat tenderizers, and monosodium glutamate (MSG)    ? Miso soup and canned or dried soup mixes (high in sodium)    ? Regular soy sauce, barbecue sauce, teriyaki sauce, steak sauce, Worcestershire sauce, and most flavored vinegars (high in sodium)    ? Regular condiments, such as mustard, ketchup, and salad dressings (high in sodium)    ? Gravy and sauces, such as Roland or cheese sauces (high in sodium and fat)    ? Drinks high in sugar, such as soda or fruit drinks    ? Snack foods, such as salted chips, popcorn, pretzels, pork rinds, salted crackers, and salted nuts    ? Frozen foods, such as dinners, entrees, vegetables with sauces, and breaded meats (high in sodium)    Other guidelines to follow:   · Maintain a healthy weight  Your risk for heart disease is higher if you are overweight  Your healthcare provider may suggest that you lose weight if you are overweight  You can lose weight by eating fewer calories and foods that have added sugars and fat   The DASH meal plan can help you do this  Decrease calories by eating smaller portions at each meal and fewer snacks  Ask your healthcare provider for more information about how to lose weight  · Exercise regularly  Regular exercise can help you reach or maintain a healthy weight  Regular exercise can also help decrease your blood pressure and improve your cholesterol levels  Get 30 minutes or more of moderate exercise each day of the week  To lose weight, get at least 60 minutes of exercise  Talk to your healthcare provider about the best exercise program for you  · Limit alcohol  Women should limit alcohol to 1 drink a day  Men should limit alcohol to 2 drinks a day  A drink of alcohol is 12 ounces of beer, 5 ounces of wine, or 1½ ounces of liquor  © Copyright [employer] 2021 Information is for End User's use only and may not be sold, redistributed or otherwise used for commercial purposes  All illustrations and images included in CareNotes® are the copyrighted property of A D A M , Inc  or [facility] [employer]   The above information is an  only  It is not intended as medical advice for individual conditions or treatments  Talk to your doctor, nurse or pharmacist before following any medical regimen to see if it is safe and effective for you  Diagnoses and all orders for this visit:    Class 3 severe obesity with body mass index (BMI) of 40 0 to 44 9 in adult, unspecified obesity type, unspecified whether serious comorbidity present ([facility] )  -     Vitamin D 25 hydroxy; Future  -     Comprehensive metabolic panel; Future  -     CBC and differential; Future  -     Lipid panel; Future  -     Hemoglobin A1C; Future    Benign essential hypertension  -     losartan (COZAAR) 50 mg tablet; Take 1 tablet (50 mg total) by mouth daily    Screening for osteoporosis  -     DXA bone density spine hip and pelvis;  Future    Encounter for vitamin deficiency screening    Encounter for screening colonoscopy  - Ambulatory referral to General Surgery; Future    Screening mammogram, encounter for  -     Mammo screening bilateral w 3d & cad; Future          Subjective:     Meghan Ramachandran is a 73 y o  female who  has a past medical history of Anemia, Arthritis, Breast cancer ([facility] ), Cancer ([facility] ), GERD (gastroesophageal reflux disease), Hyperlipidemia, Hypertension, and Stroke ([facility] )  She also has no past medical history of Allergic, Clotting disorder ([facility] ), Dementia ([facility] ), Diverticulitis of colon, Eating disorder, HL (hearing loss), Inflammatory bowel disease, Memory loss, Obesity, Osteoporosis, Otitis media, Scoliosis, Shingles, Substance abuse ([facility] ), Urinary tract infection, or Visual impairment  who presented to the office today for follow up  She has no issues today  She ran out of medication a while ago and did not get a refill  HPI      The following portions of the patient's history were reviewed and updated as appropriate: past family history  Current Outpatient Medications on File Prior to Visit   Medication Sig Dispense Refill    anastrozole (ARIMIDEX) 1 mg tablet TAKE 1 TABLET BY MOUTH ONCE DAILY   90 tablet 3    atorvastatin (LIPITOR) 40 mg tablet TAKE ONE TABLET BY MOUTH AT BEDTIME 30 tablet 0    b complex vitamins tablet Take 1 tablet by mouth daily      cyanocobalamin (VITAMIN B-12) 1,000 mcg tablet Take 1 tablet (1,000 mcg total) by mouth daily 90 tablet 1    diclofenac (VOLTAREN) 75 mg EC tablet Take 1 tablet (75 mg total) by mouth 2 (two) times a day as needed (pain) Take with food 60 tablet 1    fluticasone (FLONASE) 50 mcg/act nasal spray 2 sprays into each nostril daily 16 g 2    folic acid (FOLVITE) 1 mg tablet TAKE ONE TABLET BY MOUTH EVERY DAY 30 tablet 0    [DISCONTINUED] losartan (COZAAR) 50 mg tablet Take 1 tablet (50 mg total) by mouth daily 90 tablet 1    ergocalciferol (VITAMIN D2) 50,000 units Take 1 capsule (50,000 Units total) by mouth once a week for 50 days 4 capsule 2     No current facility-administered medications on file prior to visit  Review of Systems   Constitutional: Negative for chills, diaphoresis, fatigue and fever  HENT: Negative for congestion, ear pain, hearing loss, nosebleeds, postnasal drip, rhinorrhea and sinus pain  Eyes: Negative for visual disturbance  Respiratory: Negative for cough and shortness of breath  Cardiovascular: Negative for chest pain, palpitations and leg swelling  Gastrointestinal: Negative for abdominal distention, abdominal pain, constipation, nausea and vomiting  Musculoskeletal: Negative for arthralgias, back pain and gait problem  Skin: Negative for color change  Neurological: Negative for light-headedness, numbness and headaches  Hematological: Negative for adenopathy  Psychiatric/Behavioral: Negative for agitation  Objective:    /90 (BP Location: Right arm, Patient Position: Sitting, Cuff Size: Extra-Large)   Pulse 90   Temp (!) 96 6 °F (35 9 °C) (Temporal)   Resp 16   Ht 5' 6" (1 676 m)   Wt 126 kg (277 lb)   SpO2 95%   Breastfeeding No   BMI 44 71 kg/m²     Physical Exam  Constitutional:       Appearance: Normal appearance  She is well-developed  HENT:      Head: Normocephalic and atraumatic  Right Ear: External ear normal       Left Ear: External ear normal       Nose: Nose normal    Eyes:      Pupils: Pupils are equal, round, and reactive to light  Neck:      Thyroid: No thyromegaly  Vascular: No JVD  Trachea: No tracheal deviation  Cardiovascular:      Rate and Rhythm: Normal rate and regular rhythm  Heart sounds: Normal heart sounds  No murmur heard  No friction rub  No gallop  Pulmonary:      Effort: Pulmonary effort is normal  No respiratory distress  Breath sounds: Normal breath sounds  No stridor  No wheezing  Abdominal:      General: Bowel sounds are normal  There is no distension  Palpations: Abdomen is soft  There is no mass  Tenderness: There is no abdominal tenderness  Hernia: No hernia is present  Musculoskeletal:         General: Normal range of motion  Cervical back: Normal range of motion  Skin:     General: Skin is warm  Capillary Refill: Capillary refill takes less than 2 seconds  Findings: No rash  Neurological:      General: No focal deficit present  Mental Status: She is alert and oriented to person, place, and time  Cranial Nerves: No cranial nerve deficit  Sensory: No sensory deficit  Motor: No abnormal muscle tone     Psychiatric:         Behavior: Behavior normal          Read MD Demetria  08/16/21  12:48 PM

## 2021-08-16 NOTE — ASSESSMENT & PLAN NOTE
Not at goal   Goal of <150/90  Ran out medication a while ago  Provided refills on medication-Losartan 50mg  -Discussed Dash diet

## 2021-08-16 NOTE — PATIENT INSTRUCTIONS
DASH Eating Plan   WHAT YOU NEED TO KNOW:   The DASH (Dietary Approaches to Stop Hypertension) Eating Plan is designed to help prevent or lower high blood pressure  It can also help to lower LDL (bad) cholesterol and decrease your risk of heart disease  The plan is low in sodium, sugar, unhealthy fats, and total fat  It is high in potassium, calcium, magnesium, and fiber  These nutrients are added when you eat more fruits, vegetables, and whole grains  DISCHARGE INSTRUCTIONS:   Your sodium limit each day: Your dietitian will tell you how much sodium is safe for you to have each day  People with high blood pressure should have no more than 1,500 to 2,300 mg of sodium in a day  A teaspoon (tsp) of salt has 2,300 mg of sodium  This may seem like a difficult goal, but small changes to the foods you eat can make a big difference  Your healthcare provider or dietitian can help you create a meal plan that follows your sodium limit  How to limit sodium:   · Read food labels  Food labels can help you choose foods that are low in sodium  The amount of sodium is listed in milligrams (mg)  The % Daily Value (DV) column tells you how much of your daily needs are met by 1 serving of the food for each nutrient listed  Choose foods that have less than 5% of the DV of sodium  These foods are considered low in sodium  Foods that have 20% or more of the DV of sodium are considered high in sodium  Avoid foods that have more than 300 mg of sodium in each serving  Choose foods that say low-sodium, reduced-sodium, or no salt added on the food label  · Avoid salt  Do not salt food at the table, and add very little salt to foods during cooking  Use herbs and spices, such as onions, garlic, and salt-free seasonings to add flavor to foods  Try lemon or lime juice or vinegar to give foods a tart flavor  Use hot peppers or a small amount of hot pepper sauce to add a spicy flavor to foods  · Ask about salt substitutes    Ask your healthcare provider if you may use salt substitutes  Some salt substitutes have ingredients that can be harmful if you have certain health conditions  · Choose foods carefully at restaurants  Meals from restaurants, especially fast food restaurants, are often high in sodium  Some restaurants have nutrition information that tells you the amount of sodium in their foods  Ask to have your food prepared with less, or no salt  What you need to know about fats:   · Include healthy fats  Examples are unsaturated fats and omega-3 fatty acids  Unsaturated fats are found in soybean, canola, olive, or sunflower oil, and liquid and soft tub margarines  Omega-3 fatty acids are found in fatty fish, such as salmon, tuna, mackerel, and sardines  It is also found in flaxseed oil and ground flaxseed  · Avoid unhealthy fats  Do not eat unhealthy fats, such as saturated fats and trans fats  Saturated fats are found in foods that contain fat from animals  Examples are fatty meats, whole milk, butter, cream, and other dairy foods  It is also found in shortening, stick margarine, palm oil, and coconut oil  Trans fats are found in fried foods, crackers, chips, and baked goods made with margarine or shortening  Foods to include: With the DASH eating plan, you need to eat a certain number of servings from each food group  This will help you get enough of certain nutrients and limit others  The amount of servings you should eat depends on how many calories you need  Your dietitian can tell you how many calories you need  The number of servings listed next to the food groups below are for people who need about 2,000 calories each day  · Grains:  6 to 8 servings (3 of these servings should be whole-grain foods)    ? 1 slice of whole-grain bread    ? 1 ounce of dry cereal    ? ½ cup of cooked cereal, pasta, or brown rice    · Vegetables and fruits:  4 to 5 servings of fruits and 4 to 5 servings of vegetables    ?  1 medium fruit    ? ½ cup of frozen, canned (no added salt), or chopped fresh vegetables    ? ½ cup of fresh, frozen, dried, or canned fruit (canned in light syrup or fruit juice)    ? ½ cup of vegetable or fruit juice    · Dairy:  2 to 3 servings    ? 1 cup of nonfat (skim) or 1% milk    ? 1½ ounces of fat-free or low-fat cheese    ? 6 ounces of nonfat or low-fat yogurt    · Lean meat, poultry, and fish:  6 ounces or less    ? Poultry (chicken, turkey) with no skin    ? Fish (especially fatty fish, such as salmon, fresh tuna, or mackerel)    ? Lean beef and pork (loin, round, extra lean hamburger)    ? Egg whites and egg substitutes    · Nuts, seeds, and legumes:  4 to 5 servings each week    ? ½ cup of cooked beans and peas    ? 1½ ounces of unsalted nuts    ? 2 tablespoons of peanut butter or seeds    · Sweets and added sugars:  5 or less each week    ? 1 tablespoon of sugar, jelly, or jam    ? ½ cup of sorbet or gelatin    ? 1 cup of lemonade    · Fats:  2 to 3 servings each week    ? 1 teaspoon of soft margarine or vegetable oil    ? 1 tablespoon of mayonnaise    ? 2 tablespoons of salad dressing    Foods to avoid:   · Grains:      ? Baked goods, such as doughnuts, pastries, cookies, and biscuits (high in fat and sugar)    ? Mixes for cornbread and biscuits, packaged foods, such as bread stuffing, rice and pasta mixes, macaroni and cheese, and instant cereals (high in sodium)    · Fruits and vegetables:      ? Regular, canned vegetables (high in sodium)    ? Sauerkraut, pickled vegetables, and other foods prepared in brine (high in sodium)    ? Fried vegetables or vegetables in butter or high-fat sauces    ? Fruit in cream or butter sauce (high in fat)    · Dairy:      ? Whole milk, 2% milk, and cream (high in fat)    ?  Regular cheese and processed cheese (high in fat and sodium)    · Meats and protein foods:      ? Smoked or cured meat, such as corned beef, spears, ham, hot dogs, and sausage (high in fat and sodium)    ? Canned beans and canned meats or spreads, such as potted meats, sardines, anchovies, and imitation seafood (high in sodium)    ? Deli or lunch meats, such as bologna, ham, turkey, and roast beef (high in sodium)    ? High-fat meat (T-bone steak, regular hamburger, and ribs)    ? Whole eggs and egg yolks (high in fat)    · Other:      ? Seasonings made with salt, such as garlic salt, celery salt, onion salt, seasoned salt, meat tenderizers, and monosodium glutamate (MSG)    ? Miso soup and canned or dried soup mixes (high in sodium)    ? Regular soy sauce, barbecue sauce, teriyaki sauce, steak sauce, Worcestershire sauce, and most flavored vinegars (high in sodium)    ? Regular condiments, such as mustard, ketchup, and salad dressings (high in sodium)    ? Gravy and sauces, such as Roland or cheese sauces (high in sodium and fat)    ? Drinks high in sugar, such as soda or fruit drinks    ? Snack foods, such as salted chips, popcorn, pretzels, pork rinds, salted crackers, and salted nuts    ? Frozen foods, such as dinners, entrees, vegetables with sauces, and breaded meats (high in sodium)    Other guidelines to follow:   · Maintain a healthy weight  Your risk for heart disease is higher if you are overweight  Your healthcare provider may suggest that you lose weight if you are overweight  You can lose weight by eating fewer calories and foods that have added sugars and fat  The DASH meal plan can help you do this  Decrease calories by eating smaller portions at each meal and fewer snacks  Ask your healthcare provider for more information about how to lose weight  · Exercise regularly  Regular exercise can help you reach or maintain a healthy weight  Regular exercise can also help decrease your blood pressure and improve your cholesterol levels  Get 30 minutes or more of moderate exercise each day of the week  To lose weight, get at least 60 minutes of exercise   Talk to your healthcare provider about the best exercise program for you  · Limit alcohol  Women should limit alcohol to 1 drink a day  Men should limit alcohol to 2 drinks a day  A drink of alcohol is 12 ounces of beer, 5 ounces of wine, or 1½ ounces of liquor  © Copyright [employer] 2021 Information is for End User's use only and may not be sold, redistributed or otherwise used for commercial purposes  All illustrations and images included in CareNotes® are the copyrighted property of [employer]  or [facility]   The above information is an  only  It is not intended as medical advice for individual conditions or treatments  Talk to your doctor, nurse or pharmacist before following any medical regimen to see if it is safe and effective for you

## 2021-09-13 ENCOUNTER — RA CDI HCC (OUTPATIENT)
Dept: OTHER | Facility: HOSPITAL | Age: 66
End: 2021-09-13

## 2021-09-14 NOTE — PROGRESS NOTES
[facility]  coding opportunities       Chart reviewed, no opportunity found: CHART REVIEWED, NO OPPORTUNITY FOUND                        Patients insurance company:  [employer] [facility] (Medicare Advantage and Commercial)

## 2021-09-16 ENCOUNTER — APPOINTMENT (OUTPATIENT)
Dept: LAB | Facility: HOSPITAL | Age: 66
End: 2021-09-16
Payer: COMMERCIAL

## 2021-09-16 DIAGNOSIS — E66.01 CLASS 3 SEVERE OBESITY WITH BODY MASS INDEX (BMI) OF 40.0 TO 44.9 IN ADULT, UNSPECIFIED OBESITY TYPE, UNSPECIFIED WHETHER SERIOUS COMORBIDITY PRESENT (HCC): ICD-10-CM

## 2021-09-16 LAB
25(OH)D3 SERPL-MCNC: 34.9 NG/ML (ref 30–100)
ALBUMIN SERPL BCP-MCNC: 3.7 G/DL (ref 3–5.2)
ALP SERPL-CCNC: 118 U/L (ref 43–122)
ALT SERPL W P-5'-P-CCNC: 39 U/L
ANION GAP SERPL CALCULATED.3IONS-SCNC: 8 MMOL/L (ref 5–14)
AST SERPL W P-5'-P-CCNC: 40 U/L (ref 14–36)
BASOPHILS # BLD AUTO: 0.1 THOUSANDS/ΜL (ref 0–0.1)
BASOPHILS NFR BLD AUTO: 1 % (ref 0–1)
BILIRUB SERPL-MCNC: 0.46 MG/DL
BUN SERPL-MCNC: 11 MG/DL (ref 5–25)
CALCIUM SERPL-MCNC: 9.2 MG/DL (ref 8.4–10.2)
CHLORIDE SERPL-SCNC: 108 MMOL/L (ref 97–108)
CHOLEST SERPL-MCNC: 190 MG/DL
CO2 SERPL-SCNC: 26 MMOL/L (ref 22–30)
CREAT SERPL-MCNC: 0.56 MG/DL (ref 0.6–1.2)
EOSINOPHIL # BLD AUTO: 0.3 THOUSAND/ΜL (ref 0–0.4)
EOSINOPHIL NFR BLD AUTO: 3 % (ref 0–6)
ERYTHROCYTE [DISTWIDTH] IN BLOOD BY AUTOMATED COUNT: 14.5 %
EST. AVERAGE GLUCOSE BLD GHB EST-MCNC: 128 MG/DL
GFR SERPL CREATININE-BSD FRML MDRD: 113 ML/MIN/1.73SQ M
GLUCOSE P FAST SERPL-MCNC: 105 MG/DL (ref 70–99)
HBA1C MFR BLD: 6.1 %
HCT VFR BLD AUTO: 35.2 % (ref 36–46)
HDLC SERPL-MCNC: 42 MG/DL
HGB BLD-MCNC: 11.6 G/DL (ref 12–16)
LDLC SERPL CALC-MCNC: 126 MG/DL
LYMPHOCYTES # BLD AUTO: 2.3 THOUSANDS/ΜL (ref 0.5–4)
LYMPHOCYTES NFR BLD AUTO: 29 % (ref 25–45)
MCH RBC QN AUTO: 28.2 PG (ref 26–34)
MCHC RBC AUTO-ENTMCNC: 33 G/DL (ref 31–36)
MCV RBC AUTO: 85 FL (ref 80–100)
MONOCYTES # BLD AUTO: 0.5 THOUSAND/ΜL (ref 0.2–0.9)
MONOCYTES NFR BLD AUTO: 6 % (ref 1–10)
NEUTROPHILS # BLD AUTO: 4.8 THOUSANDS/ΜL (ref 1.8–7.8)
NEUTS SEG NFR BLD AUTO: 61 % (ref 45–65)
NONHDLC SERPL-MCNC: 148 MG/DL
PLATELET # BLD AUTO: 278 THOUSANDS/UL (ref 150–450)
PMV BLD AUTO: 9 FL (ref 8.9–12.7)
POTASSIUM SERPL-SCNC: 4.3 MMOL/L (ref 3.6–5)
PROT SERPL-MCNC: 7.3 G/DL (ref 5.9–8.4)
RBC # BLD AUTO: 4.13 MILLION/UL (ref 4–5.2)
SODIUM SERPL-SCNC: 142 MMOL/L (ref 137–147)
TRIGL SERPL-MCNC: 108 MG/DL
WBC # BLD AUTO: 7.8 THOUSAND/UL (ref 4.5–11)

## 2021-09-16 PROCEDURE — 80053 COMPREHEN METABOLIC PANEL: CPT

## 2021-09-16 PROCEDURE — 82306 VITAMIN D 25 HYDROXY: CPT

## 2021-09-16 PROCEDURE — 83036 HEMOGLOBIN GLYCOSYLATED A1C: CPT

## 2021-09-16 PROCEDURE — 80061 LIPID PANEL: CPT

## 2021-09-16 PROCEDURE — 36415 COLL VENOUS BLD VENIPUNCTURE: CPT

## 2021-09-16 PROCEDURE — 85025 COMPLETE CBC W/AUTO DIFF WBC: CPT

## 2021-09-18 ENCOUNTER — TELEPHONE (OUTPATIENT)
Dept: FAMILY MEDICINE CLINIC | Facility: CLINIC | Age: 66
End: 2021-09-18

## 2021-09-22 ENCOUNTER — OFFICE VISIT (OUTPATIENT)
Dept: FAMILY MEDICINE CLINIC | Facility: CLINIC | Age: 66
End: 2021-09-22

## 2021-09-22 VITALS
DIASTOLIC BLOOD PRESSURE: 80 MMHG | SYSTOLIC BLOOD PRESSURE: 142 MMHG | TEMPERATURE: 96.6 F | BODY MASS INDEX: 45.16 KG/M2 | OXYGEN SATURATION: 98 % | HEIGHT: 66 IN | RESPIRATION RATE: 16 BRPM | WEIGHT: 281 LBS | HEART RATE: 72 BPM

## 2021-09-22 DIAGNOSIS — Z23 ENCOUNTER FOR IMMUNIZATION: ICD-10-CM

## 2021-09-22 DIAGNOSIS — E55.9 VITAMIN D DEFICIENCY: ICD-10-CM

## 2021-09-22 DIAGNOSIS — Z17.0 MALIGNANT NEOPLASM OF UPPER-OUTER QUADRANT OF RIGHT BREAST IN FEMALE, ESTROGEN RECEPTOR POSITIVE (HCC): ICD-10-CM

## 2021-09-22 DIAGNOSIS — R74.01 TRANSAMINITIS: Primary | ICD-10-CM

## 2021-09-22 DIAGNOSIS — Z86.2 HISTORY OF ANEMIA: ICD-10-CM

## 2021-09-22 DIAGNOSIS — C50.411 MALIGNANT NEOPLASM OF UPPER-OUTER QUADRANT OF RIGHT BREAST IN FEMALE, ESTROGEN RECEPTOR POSITIVE (HCC): ICD-10-CM

## 2021-09-22 PROCEDURE — 90471 IMMUNIZATION ADMIN: CPT | Performed by: FAMILY MEDICINE

## 2021-09-22 PROCEDURE — 90662 IIV NO PRSV INCREASED AG IM: CPT | Performed by: FAMILY MEDICINE

## 2021-09-22 PROCEDURE — 90472 IMMUNIZATION ADMIN EACH ADD: CPT | Performed by: FAMILY MEDICINE

## 2021-09-22 PROCEDURE — 90732 PPSV23 VACC 2 YRS+ SUBQ/IM: CPT | Performed by: FAMILY MEDICINE

## 2021-09-22 PROCEDURE — 99213 OFFICE O/P EST LOW 20 MIN: CPT | Performed by: FAMILY MEDICINE

## 2021-09-22 RX ORDER — LANOLIN ALCOHOL/MO/W.PET/CERES
1000 CREAM (GRAM) TOPICAL DAILY
Qty: 90 TABLET | Refills: 1 | Status: SHIPPED | OUTPATIENT
Start: 2021-09-22 | End: 2022-07-07 | Stop reason: SDUPTHER

## 2021-09-22 NOTE — ASSESSMENT & PLAN NOTE
-Recent vitamin D level was wnl  -Continue maintaince Vitamin D supplement  -Will also add calcium supplment

## 2021-09-22 NOTE — PROGRESS NOTES
Assessment/Plan:    Benign essential hypertension  -At goal of <150/90  -Continue losartan 50mg daily  -Reviewed DASH diet together ( see handout)    Malignant neoplasm of right female breast ([facility] [address] )  -History of breast cancer (Stage 1A), hormonal receptor positive  -Patient has been Cancer free for 2 years  -Has mammgorgam scheduled for 10/8/2021    Vitamin D deficiency  -Recent vitamin D level was wnl  -Continue maintaince Vitamin D supplement  -Will also add calcium supplment    Transaminitis  -Review of recent labs on 9/2021 showed mildly elevated transaminitis  -Will check Chronic Hepatitis panel and monitor CMP        Return in about 3 months (around 12/22/2021) for Next scheduled follow up HTN  Diagnoses and all orders for this visit:    Transaminitis  -     Chronic Hepatitis Panel; Future    Encounter for immunization  -     influenza vaccine, high-dose, PF 0 7 mL (FLUZONE HIGH-DOSE)  -     PNEUMOCOCCAL POLYSACCHARIDE VACCINE 23-VALENT =>1YO SQ IM    Malignant neoplasm of upper-outer quadrant of right breast in female, estrogen receptor positive (HCC)  -     vitamin B-12 (VITAMIN B-12) 1,000 mcg tablet; Take 1 tablet (1,000 mcg total) by mouth daily    History of anemia  -     vitamin B-12 (VITAMIN B-12) 1,000 mcg tablet; Take 1 tablet (1,000 mcg total) by mouth daily    Vitamin D deficiency  -     Calcium Carbonate-Vitamin D 600-400 MG-UNIT per chew tablet; Chew 2 tablets daily          Subjective:     Yovanny Rodríguez is a 73 y o  female who  has a past medical history of Anemia, Arthritis, Breast cancer ([facility] [address] ), Cancer ([address] ), GERD (gastroesophageal reflux disease), Hyperlipidemia, Hypertension, and Stroke ([address] )   She also has no past medical history of Allergic, Clotting disorder ([address] ), Dementia ([address] ), Diverticulitis of colon, Eating disorder, HL (hearing loss), Inflammatory bowel disease, Memory loss, Obesity, Osteoporosis, Otitis media, Scoliosis, Shingles, Substance abuse ([address] ), Urinary tract infection, or Visual impairment  who presented to the office today for routine follow up and medication refill  She has no concerns today  She is still thinking about getting the COVID vaccine which I highly recommend  She is getting pneumococcal and flu shot today  HPI      The following portions of the patient's history were reviewed and updated as appropriate: allergies, current medications and past social history  Current Outpatient Medications on File Prior to Visit   Medication Sig Dispense Refill    anastrozole (ARIMIDEX) 1 mg tablet TAKE 1 TABLET BY MOUTH ONCE DAILY  90 tablet 3    atorvastatin (LIPITOR) 40 mg tablet TAKE ONE TABLET BY MOUTH AT BEDTIME 30 tablet 0    b complex vitamins tablet Take 1 tablet by mouth daily      fluticasone (FLONASE) 50 mcg/act nasal spray 2 sprays into each nostril daily 16 g 2    folic acid (FOLVITE) 1 mg tablet TAKE ONE TABLET BY MOUTH EVERY DAY 30 tablet 0    losartan (COZAAR) 50 mg tablet Take 1 tablet (50 mg total) by mouth daily 90 tablet 1    [DISCONTINUED] cyanocobalamin (VITAMIN B-12) 1,000 mcg tablet Take 1 tablet (1,000 mcg total) by mouth daily 90 tablet 1    diclofenac (VOLTAREN) 75 mg EC tablet Take 1 tablet (75 mg total) by mouth 2 (two) times a day as needed (pain) Take with food (Patient not taking: Reported on 9/22/2021) 60 tablet 1    ergocalciferol (VITAMIN D2) 50,000 units Take 1 capsule (50,000 Units total) by mouth once a week for 50 days 4 capsule 2     No current facility-administered medications on file prior to visit  Review of Systems   Constitutional: Negative for chills, diaphoresis, fatigue and fever  HENT: Negative for congestion, ear pain, postnasal drip, rhinorrhea, sinus pain and sore throat  Eyes: Negative for visual disturbance  Respiratory: Negative for cough and shortness of breath  Cardiovascular: Negative for chest pain, palpitations and leg swelling     Gastrointestinal: Negative for abdominal distention, abdominal pain, constipation, nausea and vomiting  Musculoskeletal: Negative for arthralgias, back pain and gait problem  Skin: Negative for color change and rash  Neurological: Negative for weakness, light-headedness, numbness and headaches  Hematological: Negative for adenopathy  Psychiatric/Behavioral: Negative for agitation  Objective:    /80 (BP Location: Right arm, Patient Position: Sitting, Cuff Size: Large)   Pulse 72   Temp (!) 96 6 °F (35 9 °C) (Temporal)   Resp 16   Ht 5' 6" (1 676 m)   Wt 127 kg (281 lb)   SpO2 98%   Breastfeeding No   BMI 45 35 kg/m²     Physical Exam  Constitutional:       General: She is not in acute distress  Appearance: Normal appearance  She is well-developed  She is not ill-appearing, toxic-appearing or diaphoretic  HENT:      Head: Normocephalic and atraumatic  Right Ear: External ear normal       Left Ear: External ear normal       Nose: Nose normal    Eyes:      Pupils: Pupils are equal, round, and reactive to light  Neck:      Thyroid: No thyromegaly  Vascular: No JVD  Trachea: No tracheal deviation  Cardiovascular:      Rate and Rhythm: Normal rate and regular rhythm  Heart sounds: Normal heart sounds  No murmur heard  No friction rub  No gallop  Pulmonary:      Effort: Pulmonary effort is normal  No respiratory distress  Breath sounds: Normal breath sounds  No stridor  No wheezing or rhonchi  Abdominal:      General: Bowel sounds are normal  There is no distension  Palpations: Abdomen is soft  There is no mass  Tenderness: There is no abdominal tenderness  There is no guarding or rebound  Hernia: No hernia is present  Musculoskeletal:         General: Normal range of motion  Cervical back: Normal range of motion  Skin:     General: Skin is warm  Capillary Refill: Capillary refill takes less than 2 seconds  Findings: No rash     Neurological:      General: No focal deficit present  Mental Status: She is alert and oriented to person, place, and time  Cranial Nerves: No cranial nerve deficit  Motor: No weakness or abnormal muscle tone        Coordination: Coordination normal       Deep Tendon Reflexes: Reflexes normal    Psychiatric:         Mood and Affect: Mood normal          Behavior: Behavior normal          Omar San MD  09/22/21  2:44 PM

## 2021-09-22 NOTE — ASSESSMENT & PLAN NOTE
-Review of recent labs on 9/2021 showed mildly elevated transaminitis  -Will check Chronic Hepatitis panel and monitor CMP

## 2021-09-22 NOTE — PATIENT INSTRUCTIONS
DASH Eating Plan   WHAT YOU NEED TO KNOW:   The DASH (Dietary Approaches to Stop Hypertension) Eating Plan is designed to help prevent or lower high blood pressure  It can also help to lower LDL (bad) cholesterol and decrease your risk of heart disease  The plan is low in sodium, sugar, unhealthy fats, and total fat  It is high in potassium, calcium, magnesium, and fiber  These nutrients are added when you eat more fruits, vegetables, and whole grains  DISCHARGE INSTRUCTIONS:   Your sodium limit each day: Your dietitian will tell you how much sodium is safe for you to have each day  People with high blood pressure should have no more than 1,500 to 2,300 mg of sodium in a day  A teaspoon (tsp) of salt has 2,300 mg of sodium  This may seem like a difficult goal, but small changes to the foods you eat can make a big difference  Your healthcare provider or dietitian can help you create a meal plan that follows your sodium limit  How to limit sodium:   · Read food labels  Food labels can help you choose foods that are low in sodium  The amount of sodium is listed in milligrams (mg)  The % Daily Value (DV) column tells you how much of your daily needs are met by 1 serving of the food for each nutrient listed  Choose foods that have less than 5% of the DV of sodium  These foods are considered low in sodium  Foods that have 20% or more of the DV of sodium are considered high in sodium  Avoid foods that have more than 300 mg of sodium in each serving  Choose foods that say low-sodium, reduced-sodium, or no salt added on the food label  · Avoid salt  Do not salt food at the table, and add very little salt to foods during cooking  Use herbs and spices, such as onions, garlic, and salt-free seasonings to add flavor to foods  Try lemon or lime juice or vinegar to give foods a tart flavor  Use hot peppers or a small amount of hot pepper sauce to add a spicy flavor to foods  · Ask about salt substitutes    Ask your healthcare provider if you may use salt substitutes  Some salt substitutes have ingredients that can be harmful if you have certain health conditions  · Choose foods carefully at restaurants  Meals from restaurants, especially fast food restaurants, are often high in sodium  Some restaurants have nutrition information that tells you the amount of sodium in their foods  Ask to have your food prepared with less, or no salt  What you need to know about fats:   · Include healthy fats  Examples are unsaturated fats and omega-3 fatty acids  Unsaturated fats are found in soybean, canola, olive, or sunflower oil, and liquid and soft tub margarines  Omega-3 fatty acids are found in fatty fish, such as salmon, tuna, mackerel, and sardines  It is also found in flaxseed oil and ground flaxseed  · Avoid unhealthy fats  Do not eat unhealthy fats, such as saturated fats and trans fats  Saturated fats are found in foods that contain fat from animals  Examples are fatty meats, whole milk, butter, cream, and other dairy foods  It is also found in shortening, stick margarine, palm oil, and coconut oil  Trans fats are found in fried foods, crackers, chips, and baked goods made with margarine or shortening  Foods to include: With the DASH eating plan, you need to eat a certain number of servings from each food group  This will help you get enough of certain nutrients and limit others  The amount of servings you should eat depends on how many calories you need  Your dietitian can tell you how many calories you need  The number of servings listed next to the food groups below are for people who need about 2,000 calories each day  · Grains:  6 to 8 servings (3 of these servings should be whole-grain foods)    ? 1 slice of whole-grain bread    ? 1 ounce of dry cereal    ? ½ cup of cooked cereal, pasta, or brown rice    · Vegetables and fruits:  4 to 5 servings of fruits and 4 to 5 servings of vegetables    ?  1 medium fruit    ? ½ cup of frozen, canned (no added salt), or chopped fresh vegetables    ? ½ cup of fresh, frozen, dried, or canned fruit (canned in light syrup or fruit juice)    ? ½ cup of vegetable or fruit juice    · Dairy:  2 to 3 servings    ? 1 cup of nonfat (skim) or 1% milk    ? 1½ ounces of fat-free or low-fat cheese    ? 6 ounces of nonfat or low-fat yogurt    · Lean meat, poultry, and fish:  6 ounces or less    ? Poultry (chicken, turkey) with no skin    ? Fish (especially fatty fish, such as salmon, fresh tuna, or mackerel)    ? Lean beef and pork (loin, round, extra lean hamburger)    ? Egg whites and egg substitutes    · Nuts, seeds, and legumes:  4 to 5 servings each week    ? ½ cup of cooked beans and peas    ? 1½ ounces of unsalted nuts    ? 2 tablespoons of peanut butter or seeds    · Sweets and added sugars:  5 or less each week    ? 1 tablespoon of sugar, jelly, or jam    ? ½ cup of sorbet or gelatin    ? 1 cup of lemonade    · Fats:  2 to 3 servings each week    ? 1 teaspoon of soft margarine or vegetable oil    ? 1 tablespoon of mayonnaise    ? 2 tablespoons of salad dressing    Foods to avoid:   · Grains:      ? Baked goods, such as doughnuts, pastries, cookies, and biscuits (high in fat and sugar)    ? Mixes for cornbread and biscuits, packaged foods, such as bread stuffing, rice and pasta mixes, macaroni and cheese, and instant cereals (high in sodium)    · Fruits and vegetables:      ? Regular, canned vegetables (high in sodium)    ? Sauerkraut, pickled vegetables, and other foods prepared in brine (high in sodium)    ? Fried vegetables or vegetables in butter or high-fat sauces    ? Fruit in cream or butter sauce (high in fat)    · Dairy:      ? Whole milk, 2% milk, and cream (high in fat)    ?  Regular cheese and processed cheese (high in fat and sodium)    · Meats and protein foods:      ? Smoked or cured meat, such as corned beef, spears, ham, hot dogs, and sausage (high in fat and sodium)    ? Canned beans and canned meats or spreads, such as potted meats, sardines, anchovies, and imitation seafood (high in sodium)    ? Deli or lunch meats, such as bologna, ham, turkey, and roast beef (high in sodium)    ? High-fat meat (T-bone steak, regular hamburger, and ribs)    ? Whole eggs and egg yolks (high in fat)    · Other:      ? Seasonings made with salt, such as garlic salt, celery salt, onion salt, seasoned salt, meat tenderizers, and monosodium glutamate (MSG)    ? Miso soup and canned or dried soup mixes (high in sodium)    ? Regular soy sauce, barbecue sauce, teriyaki sauce, steak sauce, Worcestershire sauce, and most flavored vinegars (high in sodium)    ? Regular condiments, such as mustard, ketchup, and salad dressings (high in sodium)    ? Gravy and sauces, such as Roland or cheese sauces (high in sodium and fat)    ? Drinks high in sugar, such as soda or fruit drinks    ? Snack foods, such as salted chips, popcorn, pretzels, pork rinds, salted crackers, and salted nuts    ? Frozen foods, such as dinners, entrees, vegetables with sauces, and breaded meats (high in sodium)    Other guidelines to follow:   · Maintain a healthy weight  Your risk for heart disease is higher if you are overweight  Your healthcare provider may suggest that you lose weight if you are overweight  You can lose weight by eating fewer calories and foods that have added sugars and fat  The DASH meal plan can help you do this  Decrease calories by eating smaller portions at each meal and fewer snacks  Ask your healthcare provider for more information about how to lose weight  · Exercise regularly  Regular exercise can help you reach or maintain a healthy weight  Regular exercise can also help decrease your blood pressure and improve your cholesterol levels  Get 30 minutes or more of moderate exercise each day of the week  To lose weight, get at least 60 minutes of exercise   Talk to your healthcare provider about the best exercise program for you  · Limit alcohol  Women should limit alcohol to 1 drink a day  Men should limit alcohol to 2 drinks a day  A drink of alcohol is 12 ounces of beer, 5 ounces of wine, or 1½ ounces of liquor  © Copyright [employer] 2021 Information is for End User's use only and may not be sold, redistributed or otherwise used for commercial purposes  All illustrations and images included in CareNotes® are the copyrighted property of [employer]  or [facility]   The above information is an  only  It is not intended as medical advice for individual conditions or treatments  Talk to your doctor, nurse or pharmacist before following any medical regimen to see if it is safe and effective for you

## 2021-09-22 NOTE — ASSESSMENT & PLAN NOTE
-History of breast cancer (Stage 1A), hormonal receptor positive  -Patient has been Cancer free for 2 years  -Has mammgorgam scheduled for 10/8/2021

## 2021-10-06 DIAGNOSIS — Z86.2 HISTORY OF ANEMIA: ICD-10-CM

## 2021-10-06 DIAGNOSIS — E78.2 MIXED HYPERLIPIDEMIA: ICD-10-CM

## 2021-10-07 RX ORDER — ATORVASTATIN CALCIUM 40 MG/1
40 TABLET, FILM COATED ORAL
Qty: 30 TABLET | Refills: 2 | Status: SHIPPED | OUTPATIENT
Start: 2021-10-07 | End: 2022-07-07 | Stop reason: SDUPTHER

## 2021-10-07 RX ORDER — FOLIC ACID 1 MG/1
1000 TABLET ORAL DAILY
Qty: 30 TABLET | Refills: 2 | Status: SHIPPED | OUTPATIENT
Start: 2021-10-07 | End: 2022-07-07 | Stop reason: SDUPTHER

## 2022-01-06 ENCOUNTER — TELEMEDICINE (OUTPATIENT)
Dept: FAMILY MEDICINE CLINIC | Facility: CLINIC | Age: 67
End: 2022-01-06

## 2022-01-06 DIAGNOSIS — U07.1 COVID: Primary | ICD-10-CM

## 2022-01-06 PROCEDURE — 99213 OFFICE O/P EST LOW 20 MIN: CPT | Performed by: FAMILY MEDICINE

## 2022-01-06 PROCEDURE — 1160F RVW MEDS BY RX/DR IN RCRD: CPT | Performed by: FAMILY MEDICINE

## 2022-01-06 NOTE — PROGRESS NOTES
COVID-19 Outpatient Progress Note    Assessment/Plan:    Problem List Items Addressed This Visit        Other    COVID - Primary         Disposition:     Risks and benefits of COVID-19 vaccination was discussed with patient  Patient has COVID-19 infection  Based off CDC guidelines, they were recommended to isolate for 5 days from the date of the positive test  If they remain asymptomatic, isolation may be ended followed by 5 days of wearing a mask when around othes to minimize risk of infecting others  If they have a fever, continue to stay home until fever resolves for at least 24 hours  I have spent 15 minutes directly with the patient  Greater than 50% of this time was spent in counseling/coordination of care regarding: instructions for management and patient and family education  Encounter provider Eliazar Rojo MD    Provider located at [address] [address] [phone]    Recent Visits  No visits were found meeting these conditions  Showing recent visits within past 7 days and meeting all other requirements  Today's Visits  Date Type Provider Dept   01/06/22 Misha Grove MD  [facility]   Showing today's visits and meeting all other requirements  Future Appointments  No visits were found meeting these conditions  Showing future appointments within next 150 days and meeting all other requirements     This virtual check-in was done via telephone and she agrees to proceed  Patient agrees to participate in a virtual check in via telephone or video visit instead of presenting to the office to address urgent/immediate medical needs  Patient is aware this is a billable service  After connecting through Telephone, the patient was identified by name and date of birth   Coralee Ing was informed that this was a telemedicine visit and that the exam was being conducted confidentially over secure lines  My office door was closed  No one else was in the room  Zaire Mendez acknowledged consent and understanding of privacy and security of the telemedicine visit  I informed the patient that I have reviewed her record in Epic and presented the opportunity for her to ask any questions regarding the visit today  The patient agreed to participate  It was my intent to perform this visit via video technology but the patient was not able to do a video connection so the visit was completed via audio telephone only  Verification of patient location:  Patient is located in the following state in which I hold an active license: [place]    Subjective:   Zaire Mendez is a 73 y o  female who has been screened for COVID-19  Symptom change since last report: resolving  Patient's symptoms include sore throat, cough and headache  Patient denies fever, chills, congestion, rhinorrhea, anosmia, loss of taste, shortness of breath, abdominal pain, nausea, vomiting and myalgias  Date of symptom onset: 1/1/2022  Date of positive COVID-19 PCR: 1/3/2022  COVID-19 vaccination status: Not vaccinated    Cristiana Ramirez has been staying home and has isolated themselves in her home  She is taking care to not share personal items and is cleaning all surfaces that are touched often, like counters, tabletops, and doorknobs using household cleaning sprays or wipes  She is wearing a mask when she leaves her room       Lab Results   Component Value Date    SARSCORONAVI Detected (A) 01/03/2022     Past Medical History:   Diagnosis Date    Anemia     Arthritis     [facility]     Cancer [facility]     right breast    GERD (gastroesophageal reflux disease)     Hyperlipidemia     Hypertension     Stroke ([facility]     TIA      Past Surgical History:   Procedure Laterality Date    BREAST SURGERY      TN MASTECTOMY,PARTIAL,  WITH AXILLARY LYMPHADENECTOMY Right 12/20/2018    Procedure: ULTRASOUND LOCALIZED PARTIAL MASTECTOMY W/SENTINEL NODE BIOPSY POSS AXILLARY DISSECTION;  Surgeon: Spike Sloan MD;  Location: [address] [place] [address] OR;  Service: General    TUBAL LIGATION      US GUIDED BREAST BIOPSY RIGHT COMPLETE Right 11/23/2018     Current Outpatient Medications   Medication Sig Dispense Refill    anastrozole (ARIMIDEX) 1 mg tablet TAKE 1 TABLET BY MOUTH ONCE DAILY  90 tablet 3    atorvastatin (LIPITOR) 40 mg tablet Take 1 tablet (40 mg total) by mouth daily at bedtime 30 tablet 2    b complex vitamins tablet Take 1 tablet by mouth daily      Calcium Carbonate-Vitamin D 600-400 MG-UNIT per chew tablet Chew 2 tablets daily 90 tablet 1    diclofenac (VOLTAREN) 75 mg EC tablet Take 1 tablet (75 mg total) by mouth 2 (two) times a day as needed (pain) Take with food (Patient not taking: Reported on 9/22/2021) 60 tablet 1    ergocalciferol (VITAMIN D2) 50,000 units Take 1 capsule (50,000 Units total) by mouth once a week for 50 days 4 capsule 2    fluticasone (FLONASE) 50 mcg/act nasal spray 2 sprays into each nostril daily 16 g 2    folic acid (FOLVITE) 1 mg tablet Take 1 tablet (1,000 mcg total) by mouth daily 30 tablet 2    losartan (COZAAR) 50 mg tablet Take 1 tablet (50 mg total) by mouth daily 90 tablet 1    vitamin B-12 (VITAMIN B-12) 1,000 mcg tablet Take 1 tablet (1,000 mcg total) by mouth daily 90 tablet 1     No current facility-administered medications for this visit  No Known Allergies    Review of Systems   Constitutional: Negative for chills and fever  HENT: Positive for sore throat  Negative for congestion and rhinorrhea  Respiratory: Positive for cough  Negative for shortness of breath  Gastrointestinal: Negative for abdominal pain, nausea and vomiting  Musculoskeletal: Negative for myalgias  Neurological: Positive for headaches  Objective: There were no vitals filed for this visit      Physical Exam    VIRTUAL VISIT DISCLAIMER    Zaire Mendez verbally agrees to participate in [facility]  Pt is aware that [employer] could be limited without vital signs or the ability to perform a full hands-on physical Junior Thornton understands she or the provider may request at any time to terminate the video visit and request the patient to seek care or treatment in person

## 2022-07-07 ENCOUNTER — OFFICE VISIT (OUTPATIENT)
Dept: FAMILY MEDICINE CLINIC | Facility: CLINIC | Age: 67
End: 2022-07-07

## 2022-07-07 VITALS
BODY MASS INDEX: 43.87 KG/M2 | TEMPERATURE: 96.6 F | OXYGEN SATURATION: 98 % | RESPIRATION RATE: 18 BRPM | DIASTOLIC BLOOD PRESSURE: 90 MMHG | SYSTOLIC BLOOD PRESSURE: 144 MMHG | HEART RATE: 85 BPM | HEIGHT: 66 IN | WEIGHT: 273 LBS

## 2022-07-07 DIAGNOSIS — R74.01 TRANSAMINITIS: Primary | ICD-10-CM

## 2022-07-07 DIAGNOSIS — C50.411 MALIGNANT NEOPLASM OF UPPER-OUTER QUADRANT OF RIGHT BREAST IN FEMALE, ESTROGEN RECEPTOR POSITIVE (HCC): ICD-10-CM

## 2022-07-07 DIAGNOSIS — Z17.0 MALIGNANT NEOPLASM OF UPPER-OUTER QUADRANT OF RIGHT BREAST IN FEMALE, ESTROGEN RECEPTOR POSITIVE (HCC): ICD-10-CM

## 2022-07-07 DIAGNOSIS — E55.9 VITAMIN D DEFICIENCY: ICD-10-CM

## 2022-07-07 DIAGNOSIS — Z12.11 SCREEN FOR COLON CANCER: ICD-10-CM

## 2022-07-07 DIAGNOSIS — E78.2 MIXED HYPERLIPIDEMIA: ICD-10-CM

## 2022-07-07 DIAGNOSIS — Z86.2 HISTORY OF ANEMIA: ICD-10-CM

## 2022-07-07 DIAGNOSIS — R73.03 PREDIABETES: ICD-10-CM

## 2022-07-07 DIAGNOSIS — I10 BENIGN ESSENTIAL HYPERTENSION: ICD-10-CM

## 2022-07-07 DIAGNOSIS — D64.9 ANEMIA, UNSPECIFIED TYPE: ICD-10-CM

## 2022-07-07 DIAGNOSIS — Z11.4 ENCOUNTER FOR SCREENING FOR HIV: ICD-10-CM

## 2022-07-07 DIAGNOSIS — J30.0 VASOMOTOR RHINITIS: ICD-10-CM

## 2022-07-07 PROCEDURE — 3077F SYST BP >= 140 MM HG: CPT | Performed by: FAMILY MEDICINE

## 2022-07-07 PROCEDURE — 3008F BODY MASS INDEX DOCD: CPT | Performed by: FAMILY MEDICINE

## 2022-07-07 PROCEDURE — 99214 OFFICE O/P EST MOD 30 MIN: CPT | Performed by: FAMILY MEDICINE

## 2022-07-07 PROCEDURE — 3288F FALL RISK ASSESSMENT DOCD: CPT | Performed by: FAMILY MEDICINE

## 2022-07-07 PROCEDURE — 1101F PT FALLS ASSESS-DOCD LE1/YR: CPT | Performed by: FAMILY MEDICINE

## 2022-07-07 PROCEDURE — 3080F DIAST BP >= 90 MM HG: CPT | Performed by: FAMILY MEDICINE

## 2022-07-07 PROCEDURE — 1036F TOBACCO NON-USER: CPT | Performed by: FAMILY MEDICINE

## 2022-07-07 PROCEDURE — 1160F RVW MEDS BY RX/DR IN RCRD: CPT | Performed by: FAMILY MEDICINE

## 2022-07-07 RX ORDER — FOLIC ACID 1 MG/1
1000 TABLET ORAL DAILY
Qty: 30 TABLET | Refills: 2 | Status: SHIPPED | OUTPATIENT
Start: 2022-07-07

## 2022-07-07 RX ORDER — ANASTROZOLE 1 MG/1
1 TABLET ORAL DAILY
Qty: 90 TABLET | Refills: 3 | Status: SHIPPED | OUTPATIENT
Start: 2022-07-07

## 2022-07-07 RX ORDER — ATORVASTATIN CALCIUM 40 MG/1
40 TABLET, FILM COATED ORAL
Qty: 30 TABLET | Refills: 2 | Status: SHIPPED | OUTPATIENT
Start: 2022-07-07

## 2022-07-07 RX ORDER — ERGOCALCIFEROL 1.25 MG/1
50000 CAPSULE ORAL WEEKLY
Qty: 4 CAPSULE | Refills: 2 | Status: SHIPPED | OUTPATIENT
Start: 2022-07-07 | End: 2022-08-26

## 2022-07-07 RX ORDER — FLUTICASONE PROPIONATE 50 MCG
2 SPRAY, SUSPENSION (ML) NASAL DAILY
Qty: 16 G | Refills: 2 | Status: SHIPPED | OUTPATIENT
Start: 2022-07-07

## 2022-07-07 RX ORDER — LANOLIN ALCOHOL/MO/W.PET/CERES
1000 CREAM (GRAM) TOPICAL DAILY
Qty: 90 TABLET | Refills: 1 | Status: SHIPPED | OUTPATIENT
Start: 2022-07-07

## 2022-07-07 RX ORDER — LOSARTAN POTASSIUM 50 MG/1
50 TABLET ORAL DAILY
Qty: 90 TABLET | Refills: 1 | Status: SHIPPED | OUTPATIENT
Start: 2022-07-07

## 2022-07-07 NOTE — PROGRESS NOTES
Assessment/Plan:    Benign essential hypertension  - Not at goal of 140/90 due to non compliance with meds since January 2022  - Med refilled  HTN management reviewed with patient  - Follow up in 6 weeks    Mixed hyperlipidemia  - Last CMP 1/4/22  - Stopped taking atorvastatin in January 2022  - Lipid panel ordered for recheck  Transaminitis  - CMP on 1/4/22; AST elevated  - Recheck CMP; ordered  Prediabetes  - last Hgb A1C 9/16/21   - Recheck        Diagnoses and all orders for this visit:    Transaminitis  -     Comprehensive metabolic panel; Future  -     Hepatitis panel, acute; Future    Malignant neoplasm of upper-outer quadrant of right breast in female, estrogen receptor positive (HCC)  -     anastrozole (ARIMIDEX) 1 mg tablet; Take 1 tablet (1 mg total) by mouth daily  -     vitamin B-12 (VITAMIN B-12) 1,000 mcg tablet; Take 1 tablet (1,000 mcg total) by mouth daily  -     Mammo diagnostic bilateral w cad; Future    Mixed hyperlipidemia  -     atorvastatin (LIPITOR) 40 mg tablet; Take 1 tablet (40 mg total) by mouth daily at bedtime  -     Lipid Panel with Direct LDL reflex; Future    Vitamin D deficiency  -     ergocalciferol (VITAMIN D2) 50,000 units; Take 1 capsule (50,000 Units total) by mouth once a week  -     Vitamin D 25 hydroxy; Future    Vasomotor rhinitis  -     fluticasone (FLONASE) 50 mcg/act nasal spray; 2 sprays into each nostril daily    History of anemia  -     folic acid (FOLVITE) 1 mg tablet; Take 1 tablet (1,000 mcg total) by mouth daily  -     vitamin B-12 (VITAMIN B-12) 1,000 mcg tablet; Take 1 tablet (1,000 mcg total) by mouth daily  -     CBC and differential; Future    Benign essential hypertension  -     losartan (COZAAR) 50 mg tablet; Take 1 tablet (50 mg total) by mouth daily  -     Comprehensive metabolic panel;  Future    Encounter for screening for HIV  -     HIV 1/2 Antigen/Antibody (4th Generation) w Reflex [facility]; Future    Prediabetes  -     HEMOGLOBIN A1C W/ EAG ESTIMATION; Future    Screen for colon cancer  -     Ambulatory referral for colonoscopy; Future    Anemia, unspecified type          Subjective:      Patient ID:     Haleigh Jacobson is a 78 y o female who is in the office today for BP follow up and refill of all meds   PMHx of HTN, HLD, Right Breast Ca, transaminitis, prediabetes and COVID-19 infection  Patient needs a follow up with her breast surgeon, Dr Alcala Parents, since last visit was in 12/2018  Her last Hgb A1C was on 9/16/2021  Otherwise well and in no acute distress  The following portions of the patient's history were reviewed and updated as appropriate: past family history, past social history, past surgical history and problem list     Review of Systems   Constitutional: Negative for fatigue and unexpected weight change  Respiratory: Negative for chest tightness, shortness of breath and wheezing  Cardiovascular: Positive for leg swelling  Negative for chest pain and palpitations  Gastrointestinal: Negative for abdominal distention, abdominal pain and diarrhea  Skin: Negative for rash  Objective:      /90 (BP Location: Right arm, Patient Position: Sitting, Cuff Size: Standard)   Pulse 85   Temp (!) 96 6 °F (35 9 °C) (Temporal)   Resp 18   Ht 5' 6" (1 676 m)   Wt 124 kg (273 lb)   SpO2 98%   BMI 44 06 kg/m²          Physical Exam  Vitals reviewed  Constitutional:       General: She is not in acute distress  Appearance: She is obese  She is not ill-appearing, toxic-appearing or diaphoretic  HENT:      Head: Normocephalic  Right Ear: External ear normal       Left Ear: External ear normal       Nose: No congestion  Mouth/Throat:      Mouth: Mucous membranes are moist    Eyes:      Extraocular Movements: Extraocular movements intact  Cardiovascular:      Rate and Rhythm: Normal rate and regular rhythm  Pulses: Normal pulses  Heart sounds: No friction rub  No gallop     Neurological: Mental Status: She is alert

## 2022-07-07 NOTE — ASSESSMENT & PLAN NOTE
- Last CMP 1/4/22  - Stopped taking atorvastatin in January 2022  - Lipid panel ordered for recheck

## 2022-07-07 NOTE — ASSESSMENT & PLAN NOTE
- Not at goal of 140/90 due to non compliance with meds since January 2022  - Med refilled  HTN management reviewed with patient    - Follow up in 6 weeks

## 2022-07-07 NOTE — PATIENT INSTRUCTIONS
Refill all current medications  Repeat CMP (LFTs), CBC, lipid panel, hgb A1C, Hep panel, VIT D, and HIV  Referral for mammogram and please schedule appt  Referral for Colonoscopy and please schedule appt    Follow up with PCP in 6 weeks

## 2022-07-09 ENCOUNTER — LAB (OUTPATIENT)
Dept: LAB | Facility: HOSPITAL | Age: 67
End: 2022-07-09
Payer: COMMERCIAL

## 2022-07-09 DIAGNOSIS — E55.9 VITAMIN D DEFICIENCY: ICD-10-CM

## 2022-07-09 DIAGNOSIS — Z86.2 HISTORY OF ANEMIA: ICD-10-CM

## 2022-07-09 DIAGNOSIS — E78.2 MIXED HYPERLIPIDEMIA: ICD-10-CM

## 2022-07-09 DIAGNOSIS — R73.03 PREDIABETES: ICD-10-CM

## 2022-07-09 DIAGNOSIS — R74.01 TRANSAMINITIS: ICD-10-CM

## 2022-07-09 DIAGNOSIS — Z11.4 ENCOUNTER FOR SCREENING FOR HIV: ICD-10-CM

## 2022-07-09 DIAGNOSIS — I10 BENIGN ESSENTIAL HYPERTENSION: ICD-10-CM

## 2022-07-09 LAB
25(OH)D3 SERPL-MCNC: 35.7 NG/ML (ref 30–100)
ALBUMIN SERPL BCP-MCNC: 3.8 G/DL (ref 3–5.2)
ALP SERPL-CCNC: 105 U/L (ref 43–122)
ALT SERPL W P-5'-P-CCNC: 33 U/L
ANION GAP SERPL CALCULATED.3IONS-SCNC: 4 MMOL/L (ref 5–14)
AST SERPL W P-5'-P-CCNC: 37 U/L (ref 14–36)
BASOPHILS # BLD AUTO: 0.05 THOUSANDS/ΜL (ref 0–0.1)
BASOPHILS NFR BLD AUTO: 1 % (ref 0–1)
BILIRUB SERPL-MCNC: 0.22 MG/DL
BUN SERPL-MCNC: 15 MG/DL (ref 5–25)
CALCIUM SERPL-MCNC: 8.8 MG/DL (ref 8.4–10.2)
CHLORIDE SERPL-SCNC: 108 MMOL/L (ref 97–108)
CHOLEST SERPL-MCNC: 204 MG/DL
CO2 SERPL-SCNC: 29 MMOL/L (ref 22–30)
CREAT SERPL-MCNC: 0.7 MG/DL (ref 0.6–1.2)
EOSINOPHIL # BLD AUTO: 0.17 THOUSAND/ΜL (ref 0–0.61)
EOSINOPHIL NFR BLD AUTO: 2 % (ref 0–6)
ERYTHROCYTE [DISTWIDTH] IN BLOOD BY AUTOMATED COUNT: 14 % (ref 11.6–15.1)
EST. AVERAGE GLUCOSE BLD GHB EST-MCNC: 128 MG/DL
GFR SERPL CREATININE-BSD FRML MDRD: 90 ML/MIN/1.73SQ M
GLUCOSE P FAST SERPL-MCNC: 113 MG/DL (ref 70–99)
HAV IGM SER QL: NORMAL
HBA1C MFR BLD: 6.1 %
HBV CORE AB SER QL: NORMAL
HBV CORE IGM SER QL: NORMAL
HBV CORE IGM SER QL: NORMAL
HBV SURFACE AG SER QL: NORMAL
HBV SURFACE AG SER QL: NORMAL
HCT VFR BLD AUTO: 36.3 % (ref 34.8–46.1)
HCV AB SER QL: NORMAL
HCV AB SER QL: NORMAL
HDLC SERPL-MCNC: 42 MG/DL
HGB BLD-MCNC: 11.8 G/DL (ref 11.5–15.4)
IMM GRANULOCYTES # BLD AUTO: 0.03 THOUSAND/UL (ref 0–0.2)
IMM GRANULOCYTES NFR BLD AUTO: 0 % (ref 0–2)
LDLC SERPL CALC-MCNC: 145 MG/DL
LYMPHOCYTES # BLD AUTO: 2.44 THOUSANDS/ΜL (ref 0.6–4.47)
LYMPHOCYTES NFR BLD AUTO: 29 % (ref 14–44)
MCH RBC QN AUTO: 27.8 PG (ref 26.8–34.3)
MCHC RBC AUTO-ENTMCNC: 32.5 G/DL (ref 31.4–37.4)
MCV RBC AUTO: 86 FL (ref 82–98)
MONOCYTES # BLD AUTO: 0.55 THOUSAND/ΜL (ref 0.17–1.22)
MONOCYTES NFR BLD AUTO: 7 % (ref 4–12)
NEUTROPHILS # BLD AUTO: 5.05 THOUSANDS/ΜL (ref 1.85–7.62)
NEUTS SEG NFR BLD AUTO: 61 % (ref 43–75)
NRBC BLD AUTO-RTO: 0 /100 WBCS
PLATELET # BLD AUTO: 311 THOUSANDS/UL (ref 149–390)
PMV BLD AUTO: 10.6 FL (ref 8.9–12.7)
POTASSIUM SERPL-SCNC: 4.3 MMOL/L (ref 3.6–5)
PROT SERPL-MCNC: 7.4 G/DL (ref 5.9–8.4)
RBC # BLD AUTO: 4.24 MILLION/UL (ref 3.81–5.12)
SODIUM SERPL-SCNC: 141 MMOL/L (ref 137–147)
TRIGL SERPL-MCNC: 85 MG/DL
WBC # BLD AUTO: 8.29 THOUSAND/UL (ref 4.31–10.16)

## 2022-07-09 PROCEDURE — 86704 HEP B CORE ANTIBODY TOTAL: CPT

## 2022-07-09 PROCEDURE — 83036 HEMOGLOBIN GLYCOSYLATED A1C: CPT

## 2022-07-09 PROCEDURE — 80061 LIPID PANEL: CPT

## 2022-07-09 PROCEDURE — 87389 HIV-1 AG W/HIV-1&-2 AB AG IA: CPT

## 2022-07-09 PROCEDURE — 85025 COMPLETE CBC W/AUTO DIFF WBC: CPT

## 2022-07-09 PROCEDURE — 80053 COMPREHEN METABOLIC PANEL: CPT

## 2022-07-09 PROCEDURE — 80074 ACUTE HEPATITIS PANEL: CPT

## 2022-07-09 PROCEDURE — 36415 COLL VENOUS BLD VENIPUNCTURE: CPT

## 2022-07-09 PROCEDURE — 82306 VITAMIN D 25 HYDROXY: CPT

## 2022-07-11 LAB — HIV 1+2 AB+HIV1 P24 AG SERPL QL IA: NORMAL

## 2022-10-03 ENCOUNTER — TELEPHONE (OUTPATIENT)
Dept: FAMILY MEDICINE CLINIC | Facility: CLINIC | Age: 67
End: 2022-10-03

## 2022-11-08 ENCOUNTER — HOSPITAL ENCOUNTER (OUTPATIENT)
Dept: MAMMOGRAPHY | Facility: CLINIC | Age: 67
Discharge: HOME | End: 2022-11-08

## 2022-11-08 DIAGNOSIS — C50.411 MALIGNANT NEOPLASM OF UPPER-OUTER QUADRANT OF RIGHT BREAST IN FEMALE, ESTROGEN RECEPTOR POSITIVE (HCC): ICD-10-CM

## 2022-11-08 DIAGNOSIS — Z17.0 MALIGNANT NEOPLASM OF UPPER-OUTER QUADRANT OF RIGHT BREAST IN FEMALE, ESTROGEN RECEPTOR POSITIVE (HCC): ICD-10-CM

## 2022-11-08 NOTE — RESULT ENCOUNTER NOTE
Telephone call made to patient to discuss mammogram result  Patient advised next mammogram is diagnostic and in one year  She was also reminded to schedule her colonoscopy since she had a referral upon her last PCP visit  She stated she is working on it and will call in the appointment once scheduled

## 2023-01-11 NOTE — PROGRESS NOTES
Assessment/Plan:  1  Closed nondisplaced fracture of condyle of left femur, initial encounter ([place] [address] )      Orders Placed This Encounter   Procedures    XR knee 4+ vw left injury       · Patient doing well  Medial femoral condyle fracture is healing  · Continue PT   · WBAT and activity as tolerated to LLE  · Continue diclofenac prn pain  · Work note provided stating return with light duty only  We will re-evaluate to determine if she can progress her activity  No follow-ups on file  I answered all of the patient's questions during the visit and provided education of the patient's condition during the visit  The patient verbalized understanding of the information given and agrees with the plan  This note was dictated using LLUSTRE software  It may contain errors including improperly dictated words  Please contact physician directly for any questions  Subjective   Chief Complaint:   Chief Complaint   Patient presents with    Left Knee - Fracture, Follow-up       HPI  Luis Dinero is a 73 y o  female who presents for follow up for left medial femoral condyle fracture, sustained on 2/24/21  Patient states she is doing well  Her left knee pain has improved and she feels she is able to do more now that the TROM brace is removed  She has been working with outpatient PT for her left knee strength and range of motion  Patient is taking diclofenac tabs prn pain with relief  She is requesting an updated work note today  Review of Systems  ROS:    See HPI for musculoskeletal review     All other systems reviewed are negative     History:  Past Medical History:   Diagnosis Date    Anemia     Arthritis     Breast cancer ([facility] )     Cancer ([place] [address] )     right breast    GERD (gastroesophageal reflux disease)     Hyperlipidemia     Hypertension     Stroke ([facility] )     TIA      Past Surgical History:   Procedure Laterality Date    BREAST SURGERY      MD MASTECTOMY,PARTIAL,  WITH AXILLARY LYMPHADENECTOMY Right 12/20/2018    Procedure: ULTRASOUND LOCALIZED PARTIAL MASTECTOMY W/SENTINEL NODE BIOPSY POSS AXILLARY DISSECTION;  Surgeon: Claressa Seip, MD;  Location: [address] [place] [address] OR;  Service: General    TUBAL LIGATION      US GUIDED BREAST BIOPSY RIGHT COMPLETE Right 11/23/2018     Social History   Social History     Substance and Sexual Activity   Alcohol Use No     Social History     Substance and Sexual Activity   Drug Use No     Social History     Tobacco Use   Smoking Status Never Smoker   Smokeless Tobacco Never Used   Tobacco Comment    NO TOBACCO USE     Family History:   Family History   Problem Relation Age of Onset    Colon cancer Mother 62    Hypertension Mother     Cancer Father     Pancreatic cancer Father 61    Hypertension Son     Hypertension Daughter        Current Outpatient Medications on File Prior to Visit   Medication Sig Dispense Refill    anastrozole (ARIMIDEX) 1 mg tablet TAKE 1 TABLET BY MOUTH ONCE DAILY   30 tablet 5    atorvastatin (LIPITOR) 40 mg tablet TAKE ONE TABLET BY MOUTH AT BEDTIME 30 tablet 11    b complex vitamins tablet Take 1 tablet by mouth daily      cyanocobalamin (VITAMIN B-12) 1,000 mcg tablet Take 1 tablet (1,000 mcg total) by mouth daily 90 tablet 1    diclofenac (VOLTAREN) 75 mg EC tablet Take 1 tablet (75 mg total) by mouth 2 (two) times a day 2 times a day prn for pain 60 tablet 1    ergocalciferol (VITAMIN D2) 50,000 units Take 1 capsule (50,000 Units total) by mouth once a week for 50 days 4 capsule 2    fluticasone (FLONASE) 50 mcg/act nasal spray 2 sprays into each nostril daily 16 g 2    folic acid (FOLVITE) 1 mg tablet TAKE ONE TABLET BY MOUTH EVERY DAY 30 tablet 11    ibuprofen (MOTRIN) 600 mg tablet Take 1 tablet (600 mg total) by mouth every 6 (six) hours as needed for mild pain or moderate pain 30 tablet 0    losartan (COZAAR) 50 mg tablet Take 1 tablet (50 mg total) by mouth daily 90 tablet 1     No current facility-administered medications on file prior to visit        No Known Allergies     Objective     /78   Pulse 84   Temp 97 5 °F (36 4 °C)   Ht 5' 6" (1 676 m)   Wt 126 kg (278 lb)   BMI 44 87 kg/m²      PE:  AAOx 3  WDWN  Hearing intact, no drainage from eyes  no audible wheezing  no abdominal distension  LE compartments soft, skin intact    Ortho Exam:  left Knee:   No erythema  no swelling  no effusion  no warmth  +TTP over medial joint line and patellar tendon  AROM: 0- 125  Stable to varus/valgus stress    Imaging Studies: I have personally reviewed pertinent films in PACS  X-rays left knee: medial femoral condyle fracture with alignment maintained Patent

## 2023-03-20 ENCOUNTER — TELEPHONE (OUTPATIENT)
Dept: FAMILY MEDICINE CLINIC | Facility: CLINIC | Age: 68
End: 2023-03-20

## 2023-03-21 ENCOUNTER — OFFICE VISIT (OUTPATIENT)
Dept: FAMILY MEDICINE CLINIC | Facility: CLINIC | Age: 68
End: 2023-03-21

## 2023-03-21 VITALS
TEMPERATURE: 97.7 F | OXYGEN SATURATION: 97 % | DIASTOLIC BLOOD PRESSURE: 80 MMHG | WEIGHT: 275 LBS | BODY MASS INDEX: 44.2 KG/M2 | HEIGHT: 66 IN | RESPIRATION RATE: 18 BRPM | SYSTOLIC BLOOD PRESSURE: 138 MMHG | HEART RATE: 107 BPM

## 2023-03-21 DIAGNOSIS — E55.9 VITAMIN D DEFICIENCY: ICD-10-CM

## 2023-03-21 DIAGNOSIS — I88.9 CERVICAL LYMPHADENITIS: Primary | ICD-10-CM

## 2023-03-21 DIAGNOSIS — I10 BENIGN ESSENTIAL HYPERTENSION: ICD-10-CM

## 2023-03-21 PROBLEM — U07.1 COVID: Status: RESOLVED | Noted: 2022-01-06 | Resolved: 2023-03-21

## 2023-03-21 RX ORDER — LOSARTAN POTASSIUM 50 MG/1
50 TABLET ORAL DAILY
Qty: 90 TABLET | Refills: 1 | Status: SHIPPED | OUTPATIENT
Start: 2023-03-21

## 2023-03-21 RX ORDER — ERGOCALCIFEROL 1.25 MG/1
50000 CAPSULE ORAL WEEKLY
Qty: 4 CAPSULE | Refills: 2 | Status: SHIPPED | OUTPATIENT
Start: 2023-03-21 | End: 2023-05-10

## 2023-03-22 PROBLEM — R74.01 TRANSAMINITIS: Status: RESOLVED | Noted: 2021-09-22 | Resolved: 2023-03-22

## 2023-03-22 NOTE — PROGRESS NOTES
Name: Ana Cantu      : 1955      MRN: [id number]  Encounter Provider: Stanley Vincent MD  Encounter Date: 3/21/2023   Encounter department: [address] [place] [address]     1  Cervical lymphadenitis  Assessment & Plan:  Patient visiting today for c/o sore throat, dry cough, and subjective fever, and chills x 1 week  She endorses sore throat intermittently for more than a year but more prominent this time  She was seen at the [facility] 3/20 for this s/sx  She was placed on Augumentin 875mg/125mg PO bid x 7 days-   CBC was done and WBC was 10 7 and wnl  PE: Anterior cervical lymphadenopathy measuring about 5 cm x 2 5cm, painless, mobile, and firm X 1 week prior to  s/sx of cough and sore throat  Endorses night sweats 2/2 hot flashes 2/2 menopausal s/sx  PE: No evidence of a retropharyngeal and dental abscess    - CTA neck soft tissue w/contrast  - Continue Augmentin course as prescribed to see if resolves  - Return if unresolved or worsened s/sx or in 2 weeks for fu  - Pt to call 911 if SOB, difficulty swallowing, tight sensation around neck, or any other suspicious s/sx   - HIV and Quant gold tests    Orders:  -     CT soft tissue neck w contrast; Future; Expected date: 2023  -     : HIV 1/2 AB/AG w Reflex [facility] for 2 yr old and above; Future  -     Quantiferon TB Gold Plus; Future    2  Vitamin D deficiency  Assessment & Plan:  Discussed importance of continued daily Vit supplementation      - Continue Vit D2 supplementation with 50,000 units weekly  Orders:  -     ergocalciferol (VITAMIN D2) 50,000 units; Take 1 capsule (50,000 Units total) by mouth once a week    3  Benign essential hypertension  Assessment & Plan:  BP tpday: 138/80; within goal  Home meds: Losartan 50 mg qd    - Continue home meds and reduce salt intake  Orders:  -     losartan (COZAAR) 50 mg tablet;  Take 1 tablet (50 mg total) by mouth daily Linda Rueda is a 72year old female patient with a PMHx of prediabetes, HTN, HLD and transaminitis who is visiting today to follow up with pcp as instructed  Patient states she has had swollen lymph nodes a week prior to when she developed dry cough and sore throat  She went to urgent care 1 week afer dveloping s/sx  She denies chest pain/tightness, abdominal pain, n/v/d/c  Review of Systems Per HPI    Current Outpatient Medications on File Prior to Visit   Medication Sig   • anastrozole (ARIMIDEX) 1 mg tablet Take 1 tablet (1 mg total) by mouth daily   • atorvastatin (LIPITOR) 40 mg tablet Take 1 tablet (40 mg total) by mouth daily at bedtime   • b complex vitamins tablet Take 1 tablet by mouth daily   • folic acid (FOLVITE) 1 mg tablet Take 1 tablet (1,000 mcg total) by mouth daily   • vitamin B-12 (VITAMIN B-12) 1,000 mcg tablet Take 1 tablet (1,000 mcg total) by mouth daily       Objective     /80 (BP Location: Left arm, Patient Position: Sitting, Cuff Size: Standard)   Pulse (!) 107   Temp 97 7 °F (36 5 °C) (Temporal)   Resp 18   Ht 5' 6" (1 676 m)   Wt 125 kg (275 lb)   SpO2 97%   BMI 44 39 kg/m²     Physical Exam  Vitals reviewed  Constitutional:       General: She is not in acute distress  Appearance: She is normal weight  She is not toxic-appearing  HENT:      Head:      Salivary Glands: Right salivary gland is not diffusely enlarged or tender  Left salivary gland is not diffusely enlarged or tender  Right Ear: Tympanic membrane, ear canal and external ear normal       Left Ear: Tympanic membrane, ear canal and external ear normal       Nose: Nose normal       Mouth/Throat:      Mouth: Mucous membranes are moist    Eyes:      Extraocular Movements: Extraocular movements intact  Neck:      Comments: Right anterior cervical lymphadenopathy  Painless, firm and movable with aching radiating pain toe right ear   Unremarkable ear exam    Cardiovascular: Rate and Rhythm: Normal rate and regular rhythm  Pulses: Normal pulses  Heart sounds: Normal heart sounds  No murmur heard  No friction rub  No gallop  Pulmonary:      Effort: Pulmonary effort is normal    Abdominal:      General: Bowel sounds are normal       Palpations: Abdomen is soft  There is no mass  Tenderness: There is no rebound  Neurological:      General: No focal deficit present  Mental Status: She is alert and oriented to person, place, and time  Psychiatric:         Mood and Affect: Mood normal          Behavior: Behavior normal          Thought Content:  Thought content normal          Judgment: Judgment normal        Ana Lilia Griffin MD

## 2023-03-22 NOTE — ASSESSMENT & PLAN NOTE
Patient visiting today for c/o sore throat, dry cough, and subjective fever, and chills x 1 week  She endorses sore throat intermittently for more than a year but more prominent this time  She was seen at the [facility] on 3/20 for this s/sx  She was placed on Augumentin 875mg/125mg PO bid x 7 days- 2/7  CBC was done and WBC was 10 7 and wnl  PE: Anterior cervical lymphadenopathy measuring about 5 cm x 2 5cm, painless, mobile, and firm X 1 week prior to  s/sx of cough and sore throat  Endorses night sweats 2/2 hot flashes 2/2 menopausal s/sx    PE: No evidence of a retropharyngeal and dental abscess    - CTA neck soft tissue w/contrast  - Continue Augmentin course as prescribed to see if resolves  - Return if unresolved or worsened s/sx or in 2 weeks for fu  - Pt to call 911 if SOB, difficulty swallowing, tight sensation around neck, or any other suspicious s/sx   - HIV and Quant gold tests

## 2023-03-22 NOTE — ASSESSMENT & PLAN NOTE
BP tpday: 138/80; within goal  Home meds: Losartan 50 mg qd    - Continue home meds and reduce salt intake

## 2023-03-22 NOTE — ASSESSMENT & PLAN NOTE
Discussed importance of continued daily Vit supplementation      - Continue Vit D2 supplementation with 50,000 units weekly

## 2023-03-30 ENCOUNTER — HOSPITAL ENCOUNTER (OUTPATIENT)
Dept: CT IMAGING | Facility: HOSPITAL | Age: 68
End: 2023-03-30

## 2023-03-30 DIAGNOSIS — I88.9 CERVICAL LYMPHADENITIS: ICD-10-CM

## 2023-03-30 RX ADMIN — IOHEXOL 85 ML: 350 INJECTION, SOLUTION INTRAVENOUS at 10:38

## 2023-04-03 ENCOUNTER — TELEPHONE (OUTPATIENT)
Dept: FAMILY MEDICINE CLINIC | Facility: CLINIC | Age: 68
End: 2023-04-03

## 2023-04-04 ENCOUNTER — OFFICE VISIT (OUTPATIENT)
Dept: FAMILY MEDICINE CLINIC | Facility: CLINIC | Age: 68
End: 2023-04-04

## 2023-04-04 VITALS
HEART RATE: 92 BPM | RESPIRATION RATE: 18 BRPM | DIASTOLIC BLOOD PRESSURE: 84 MMHG | SYSTOLIC BLOOD PRESSURE: 132 MMHG | BODY MASS INDEX: 44.71 KG/M2 | WEIGHT: 278.2 LBS | TEMPERATURE: 97.1 F | HEIGHT: 66 IN | OXYGEN SATURATION: 97 %

## 2023-04-04 DIAGNOSIS — R73.03 PREDIABETES: ICD-10-CM

## 2023-04-04 DIAGNOSIS — I88.9 CERVICAL LYMPHADENITIS: Primary | ICD-10-CM

## 2023-04-04 DIAGNOSIS — I10 PRIMARY HYPERTENSION: ICD-10-CM

## 2023-04-04 DIAGNOSIS — E04.1 RIGHT THYROID NODULE: ICD-10-CM

## 2023-04-04 PROBLEM — Z09 FOLLOW-UP EXAMINATION: Status: ACTIVE | Noted: 2023-04-04

## 2023-04-04 NOTE — ASSESSMENT & PLAN NOTE
Patient states no longer have sore throat  Right neck mass measured today at 8 5 cm by 4 5 cm, painless, mobile and firm  Patient states mass has been there for about 2 months prior to initial presentation  CT neck on 3/30/2023- Large right level 2A lymphadenopathy as described above and suspicious for neoplasm  Correlation with histopathology is recommended  Mild asymmetric prominence of the right palatine tonsil with otherwise no definitive nodular enhancing lesions identified along the course of the aerodigestive tract  - Referral provided to surgical oncologist Dr Karena Xavier   Sent TT notifying him of CT scan result and need for biopsy   - Sent message to  referral team for assistance with coordinating referral  - Patient to get HIV/Quantiferon test done

## 2023-04-04 NOTE — ASSESSMENT & PLAN NOTE
3/30/23 Neck CT scan finding-Heterogeneous right thyroid lobe nodule  Ultrasound is recommended for further evaluation  Discussed result with patient along with fu recommendation  Demonstrated understanding and in agreement with plan

## 2023-04-04 NOTE — PROGRESS NOTES
Name: Ricki Damico      : 1955      MRN: [id number]  Encounter Provider: Mil Brown MD  Encounter Date: 2023   Encounter department: [address] [place] [address]     1  Cervical lymphadenitis  Assessment & Plan:  Patient states no longer have sore throat  Right neck mass measured today at 8 5 cm by 4 5 cm, painless, mobile and firm  Patient states mass has been there for about 2 months prior to initial presentation  CT neck on 3/30/2023- Large right level 2A lymphadenopathy as described above and suspicious for neoplasm  Correlation with histopathology is recommended  Mild asymmetric prominence of the right palatine tonsil with otherwise no definitive nodular enhancing lesions identified along the course of the aerodigestive tract  - Referral provided to surgical oncologist Dr Valeria Victor  Sent TT notifying him of CT scan result and need for biopsy   - Sent message to  referral team for assistance with coordinating referral  - Patient to get HIV/Quantiferon test done    Orders:  -     Ambulatory Referral to Surgical Oncology; Future    2  Right thyroid nodule  Assessment & Plan:  3/30/23 Neck CT scan finding-Heterogeneous right thyroid lobe nodule  Ultrasound is recommended for further evaluation  Discussed result with patient along with fu recommendation  Demonstrated understanding and in agreement with plan  Orders:  -     US thyroid; Future; Expected date: 2023    3  Prediabetes  -     Comprehensive metabolic panel; Future  -     Lipid Panel with Direct LDL reflex; Future  -     HEMOGLOBIN A1C W/ EAG ESTIMATION; Future    4  Primary hypertension  Assessment & Plan:  BP today- 132/84; within goal  Continue home med    Orders:  -     Lipid Panel with Direct LDL reflex; Future           Subjective      Patient is a 80year old female visiting today to follow up on right neck mass and to discuss CT scan result  "Patient demonstrated understanding of the required follow up and is in agreement with plan  She denies sore throat or difficulty swallowing today  Review of Systems   HENT: Negative for sore throat, trouble swallowing and voice change  Respiratory: Negative for choking, chest tightness, shortness of breath and stridor  Cardiovascular: Negative  Gastrointestinal: Negative  Genitourinary: Negative  Neurological: Negative  Psychiatric/Behavioral: The patient is nervous/anxious  Current Outpatient Medications on File Prior to Visit   Medication Sig   • anastrozole (ARIMIDEX) 1 mg tablet Take 1 tablet (1 mg total) by mouth daily   • atorvastatin (LIPITOR) 40 mg tablet Take 1 tablet (40 mg total) by mouth daily at bedtime   • b complex vitamins tablet Take 1 tablet by mouth daily   • ergocalciferol (VITAMIN D2) 50,000 units Take 1 capsule (50,000 Units total) by mouth once a week   • folic acid (FOLVITE) 1 mg tablet Take 1 tablet (1,000 mcg total) by mouth daily   • losartan (COZAAR) 50 mg tablet Take 1 tablet (50 mg total) by mouth daily   • vitamin B-12 (VITAMIN B-12) 1,000 mcg tablet Take 1 tablet (1,000 mcg total) by mouth daily       Objective     /84 (BP Location: Left arm, Patient Position: Sitting, Cuff Size: Adult)   Pulse 92   Temp (!) 97 1 °F (36 2 °C) (Temporal)   Resp 18   Ht 5' 6\" (1 676 m)   Wt 126 kg (278 lb 3 2 oz)   SpO2 97%   BMI 44 90 kg/m²     Physical Exam  Vitals reviewed  Constitutional:       General: She is not in acute distress  Appearance: She is obese  She is not ill-appearing, toxic-appearing or diaphoretic  HENT:      Head: Normocephalic  Nose: Nose normal       Mouth/Throat:      Mouth: Mucous membranes are moist    Eyes:      General: No scleral icterus  Extraocular Movements: Extraocular movements intact        Conjunctiva/sclera: Conjunctivae normal    Neck:      Comments: Painless, firm, right neck mass measuring approx 8 5cm by " 4 5cm  Cardiovascular:      Rate and Rhythm: Normal rate and regular rhythm  Pulses: Normal pulses  Heart sounds: Normal heart sounds  No murmur heard  Pulmonary:      Effort: Pulmonary effort is normal  No respiratory distress  Breath sounds: Normal breath sounds  Chest:      Chest wall: No tenderness  Abdominal:      Palpations: Abdomen is soft  Musculoskeletal:      Cervical back: Neck supple  No tenderness  Right lower leg: No edema  Left lower leg: No edema  Lymphadenopathy:      Head:      Right side of head: No submental, submandibular, tonsillar, preauricular, posterior auricular or occipital adenopathy  Left side of head: No submental, submandibular, tonsillar, preauricular, posterior auricular or occipital adenopathy  Cervical: Cervical adenopathy present  Right cervical: Superficial cervical adenopathy present  Left cervical: No superficial, deep or posterior cervical adenopathy  Upper Body:      Right upper body: No supraclavicular adenopathy  Left upper body: No supraclavicular adenopathy  Skin:     General: Skin is warm  Capillary Refill: Capillary refill takes less than 2 seconds  Neurological:      General: No focal deficit present  Mental Status: She is alert and oriented to person, place, and time     Psychiatric:         Behavior: Behavior normal       Comments: Appears anxious due to medical condition       Consuelo Golden MD

## 2023-04-04 NOTE — PATIENT INSTRUCTIONS
Thyroid Nodules   WHAT YOU NEED TO KNOW:   What are thyroid nodules? Thyroid nodules are growths on your thyroid gland  Your thyroid makes hormones that help control your body temperature, heart rate, and growth  The hormones also control how fast your body uses food for energy  Some nodules are lumps of tissue, and others are filled with fluid  What increases my risk for thyroid nodules? A lack of iodine in the foods you eat is the most common cause of thyroid nodules  The following may increase your risk:  Autoimmune thyroid disorders, such as Hashimoto disease    Medical conditions, such as cancer, a thyroid infection, thyroid goiter, or a thyroid cyst    A family history  of thyroid nodules or thyroid cancer    Pregnancy  that causes your body to create more hormones    Past radiation  treatment to your head or neck    What are the signs and symptoms of thyroid nodules? A small nodule may have no signs or symptoms  As your nodule grows, you may be able to see a lump on your neck  A large nodule may press on your airway or neck veins and cause the following:  A cough or choking and hoarse voice    Flushed face and swollen neck or neck veins    Noisy, high-pitched breathing    Pain when you swallow or trouble swallowing    Trouble breathing when you lie down    How are thyroid nodules diagnosed? Blood tests  are done to check the level of thyroid hormones in your body  A blood test may also show if you have an autoimmune disease that caused your nodules  An ultrasound  uses sound waves to show pictures of your thyroid on a screen  A fine-needle biopsy  is done to get a tissue sample from your thyroid gland to be tested  How are thyroid nodules treated? Thyroid medicine  is given to bring your thyroid hormone levels back to a normal range  Radioactive iodine  is given to damage cells in your thyroid gland and decrease the size of your nodules       Laser ablation  is done to make your nodules You came to [facility] because of your seizure disorder.  Your seizures are well controlled on carbamazepine 200mg three times daily.  Please continue the same medication at the same dose.     Do not miss any doses of medication. If a dose of medication is missed, take it as soon as it is remembered even if that means doubling up on the dose. Please get a lab test to check out the blood level of medication. Get regular sleep. Go to sleep at the same time and wake up at the same time every day. People with epilepsy require more sleep than people without epilepsy.  Sleeping 10-12 hours a day can be normal for a person with epilepsy.      Per [place] law, no episodes of loss of consciousness for 6 months before driving.  Avoid dangerous situations.  For example, no baths/pools alone, no heights, no power tools.  Wear a bike helmet.  If breakthrough seizures occur that are different in character, frequency, or duration from normal episodes, please patient portal me or call the office and we will decide the next steps. If multiple seizures occur in a row without return back to baseline, 911 needs to be called.     Return to clinic in one year or sooner with issues.  Please patient portal with any questions or concerns.    Faby Lee PA-C   Neurology-Epilepsy  [facility]   smaller  Ask for more information about laser ablation  Surgery  may be done to remove all or part of your thyroid gland  Surgery is done if your nodules are cancerous  Ask for more information about thyroid surgery  What can I do to manage my thyroid nodules? Eat iodine-rich foods  Examples include fish, seaweed, dairy products, eggs, beans, and lean meat  Iodized salt also contains iodine  You may need to use iodized table salt when you cook and season your food  Iodine may be added to bread or to your drinking water  Ask for a list of foods that contain iodine, and ask how much iodine you need each day  Go to follow-up appointments  Write down your questions so you remember to ask them during your visits  When should I contact my healthcare provider? You have a new cough that does not improve  You begin choking or have new or increased trouble swallowing  Your voice becomes hoarse  You are losing weight without trying  You have questions or concerns about your condition or care  When should I seek immediate care or call 911? You have sudden chest pain or trouble breathing  Your symptoms worsen, even after you take your medicines  CARE AGREEMENT:   You have the right to help plan your care  Learn about your health condition and how it may be treated  Discuss treatment options with your healthcare providers to decide what care you want to receive  You always have the right to refuse treatment  The above information is an  only  It is not intended as medical advice for individual conditions or treatments  Talk to your doctor, nurse or pharmacist before following any medical regimen to see if it is safe and effective for you  © Copyright [employer] 2022 Information is for End User's use only and may not be sold, redistributed or otherwise used for commercial purposes

## 2023-04-05 ENCOUNTER — TELEPHONE (OUTPATIENT)
Dept: HEMATOLOGY ONCOLOGY | Facility: CLINIC | Age: 68
End: 2023-04-05

## 2023-04-06 ENCOUNTER — TELEPHONE (OUTPATIENT)
Dept: HEMATOLOGY ONCOLOGY | Facility: CLINIC | Age: 68
End: 2023-04-06

## 2023-04-06 NOTE — TELEPHONE ENCOUNTER
Call Transfer   Reason for patient call? Patient calling in trying to schedule a biopsy  If someone other than patient is calling, are they listed on the communication consent? N/A   Who is the patients Primary Oncologist?    Person/Department that the call was transferred to? Time that call was transferred? Central Scheduling @ 9:39AM   Informed patient that the message will be forwarded to the team and someone will get back to them as soon as possible  Did you relay this information to the patient?  Yes

## 2023-04-06 NOTE — TELEPHONE ENCOUNTER
I called Maira to schedule a new patient consultation with [facility] in response to a referral sent to our department       I left a voicemail making patient aware of their referral and instructing them to call [facility] at [phone] to schedule      Another attempt to schedule will be made to schedule patient

## 2023-04-25 PROBLEM — R59.0 CERVICAL LYMPHADENOPATHY: Status: ACTIVE | Noted: 2023-03-21

## 2023-04-26 ENCOUNTER — CONSULT (OUTPATIENT)
Dept: SURGICAL ONCOLOGY | Facility: CLINIC | Age: 68
End: 2023-04-26

## 2023-04-26 VITALS
HEIGHT: 66 IN | SYSTOLIC BLOOD PRESSURE: 168 MMHG | BODY MASS INDEX: 44.2 KG/M2 | DIASTOLIC BLOOD PRESSURE: 86 MMHG | WEIGHT: 275 LBS | HEART RATE: 76 BPM | TEMPERATURE: 97.4 F | OXYGEN SATURATION: 100 %

## 2023-04-26 DIAGNOSIS — E04.1 RIGHT THYROID NODULE: ICD-10-CM

## 2023-04-26 DIAGNOSIS — Z17.0 MALIGNANT NEOPLASM OF UPPER-OUTER QUADRANT OF RIGHT BREAST IN FEMALE, ESTROGEN RECEPTOR POSITIVE (HCC): ICD-10-CM

## 2023-04-26 DIAGNOSIS — R59.0 CERVICAL LYMPHADENOPATHY: Primary | ICD-10-CM

## 2023-04-26 DIAGNOSIS — C50.411 MALIGNANT NEOPLASM OF UPPER-OUTER QUADRANT OF RIGHT BREAST IN FEMALE, ESTROGEN RECEPTOR POSITIVE (HCC): ICD-10-CM

## 2023-04-26 DIAGNOSIS — I88.9 CERVICAL LYMPHADENITIS: ICD-10-CM

## 2023-04-26 NOTE — PROGRESS NOTES
Surgical Oncology Consultation    [address] [facility]  [address] [facility] [address] [phone]    Patient:  Diya Simon  1955  [id number]    Primary Care provider:  Luh Malik MD  [address] [facility] [place]   [phone]  [phone]    Referring provider:  Luh Malik MD  [address] [place]    Diagnoses and all orders for this visit:    Cervical lymphadenopathy  -     US guided lymph node biopsy right; Future    Right thyroid nodule    Cervical lymphadenitis  -     Ambulatory Referral to Surgical Oncology    Malignant neoplasm of upper-outer quadrant of right breast in female, estrogen receptor positive [facility])        Chief Complaint   Patient presents with   • Consult       No follow-ups on file  Oncology History Overview Note   Patient returns for first follow-up s/p radiation to the right breast on 4/3/19     62year old female with T2 N0 (stage IIA) invasive ductal carcinoma of the right breast status post breast conservation surgery  Her tumor is ERPR positive her 2-  Oncotype DX returned 13 and Dr Kori Lennon recommended   adjuvant hormone therapy without any chemotherapy  She started anastrozole on 2/1/19 and completed whole right breast radiation followed by boost to the primary site on 4/3/19       5/23/19 Med Onc, Dr Kori Lennon follow-up  Pt tolerating anastrozole well with some hot flashes and arthralgias in her fingers  Plan to continue anastrozole and calcium/Vit D supplement daily  Pt will f/u in 4 months  F/U with Dr Jen Hester ? [facility] breast surgeon       Malignant neoplasm of right female breast ([facility] [address] )   11/23/2018 Initial Diagnosis    Malignant neoplasm of right female breast ([facility] [address] )     11/23/2018 Biopsy    Rt Breast US BX 1100 8cmfn, 4 passes 12g Marquee:  - Invasive breast carcinoma of no special type (ductal NST/invasive ductal carcinoma)    - grade 2  - %  - IA 95%  - HER2 negative     12/20/2018 Surgery    Right partial mastectomy and SLN biopsy:  Infiltrating ductal carcinoma, Grade 2/3, felt to be between 2 0 cm and 2 5 cm in greatest dimension  - No invasive tumor is seen at inked margins, but there is a focus of DCIS seen within approximately 1mm of the inked medial margin  - no LVI  - no LCIS  - 0/2 LN's  at least Stage IIA - pT2, pN0, cM0, G2     - Dr Fam Bacon     12/20/2018 -  Cancer Staged    Stage IIA - pT2, pN0, cM0, G2        1/4/2019 Genomic Testing    Oncotype DX score: 13     2/1/2019 -  Hormone Therapy    anastrozole 1 mg daily as adjuvant endocrine therapy    - Dr Tangela Rivero       2/14/2019 - 4/3/2019 Radiation    Course: C1    Plan ID Energy Fractions Dose per Fraction (cGy) Dose Correction (cGy) Total Dose Delivered (cGy) Elapsed Days   R Breast 10X/6X 25 / 25 200 0 5,000 40   R BRST BOOST 16E 5 / 5 200 0 1,000 7      Treatment dates:  C1: 2/14/2019 - 4/3/2019           History of Present Illness  : This is a 52-year-old female seen today with a new right neck nodule  Briefly, the patient presented to her primary care physician due to what she describes as throat soreness  A large right neck nodule was identified, prompting a CT scan of the neck  This demonstrated a near 5 cm right level 2 lymph node with multicystic components  It also detected a right heterogeneous thyroid nodule of about 2 cm which did not appear to be locally invasive  The patient denies any significant symptoms of throat pain, trouble breathing, trouble swallowing, pain with swallowing, voice changes  She does describe a sore throat for months if not years  She states that the right sided neck nodule became enlarged over the last couple of months but it has not really bothered her  She does have a history of right breast cancer treated with breast conservation in 2018  Her mammograms since that time have been benign    She denies any other lumps or bumps of the left neck, axilla, inguinal regions  She denies any fevers, chills, night sweats, weight loss, other systemic symptoms  Review of Systems  Complete ROS Surg Onc:   Constitutional: The patient denies new or recent history of general fatigue, no recent weight loss, no change in appetite  Eyes: No complaints of visual problems, no scleral icterus  ENT: No complaints of ear pain, no hoarseness, no difficulty swallowing,  no tinnitus and no new masses in head, oral cavity, or neck  Cardiovascular: No complaints of chest pain, no palpitations, no ankle edema  Respiratory: No complaints of shortness of breath, no cough  Gastrointestinal: No complaints of jaundice, no bloody stools, no pale stools  Genitourinary: No complaints of dysuria, no hematuria, no nocturia, no frequent urination, no urethral discharge  Musculoskeletal: No complaints of weakness, paralysis, joint stiffness or arthralgias  Integumentary: No complaints of rash, no new lesions  Neurological: No complaints of convulsions, no seizures, no dizziness  Hematologic/Lymphatic: No complaints of easy bruising  Endocrine:  No hot or cold intolerance  No polydipsia, polyphagia, or polyuria  Allergy/immunology:  No environmental allergies  No food allergies  Not immunocompromised        Patient Active Problem List   Diagnosis   • Primary hypertension   • Vasomotor rhinitis   • Mixed hyperlipidemia   • Screen for colon cancer   • Malignant neoplasm of right female breast [facility])   • Vitamin D deficiency   • Encounter for screening for HIV   • Prediabetes   • Cervical lymphadenopathy   • Follow-up examination   • Right thyroid nodule     Past Medical History:   Diagnosis Date   • Anemia    • Arthritis    • [facility] 12/17/2018   • Cancer ([facility] )     right breast   • GERD (gastroesophageal reflux disease)    • Hyperlipidemia    • Hypertension    • Stroke [facility])     TIA    • Transaminitis 9/22/2021     Past Surgical History: Procedure Laterality Date   • BREAST BIOPSY Right 11/23/2018    us guided bx cancer   • BREAST SURGERY     • NE MASTECTOMY PARTIAL W/AXILLARY LYMPHADENECTOMY Right 12/20/2018    Procedure: ULTRASOUND LOCALIZED PARTIAL MASTECTOMY W/SENTINEL NODE BIOPSY POSS AXILLARY DISSECTION;  Surgeon: Hellen Bravo MD;  Location: [address] [place] [address] OR;  Service: General   • TUBAL LIGATION     • US GUIDED BREAST BIOPSY RIGHT COMPLETE Right 11/23/2018   • US GUIDED INJECTION FOR RESEARCH STUDY  12/20/2018   • US GUIDED INJECTION FOR RESEARCH STUDY  12/7/2018   • US GUIDED INJECTION FOR RESEARCH STUDY  5/3/2019     Family History   Problem Relation Age of Onset   • Colon cancer Mother 62   • Hypertension Mother    • Cancer Father    • Pancreatic cancer Father 61   • Hypertension Son    • Hypertension Daughter      Social History     Socioeconomic History   • Marital status: Single     Spouse name: Not on file   • Number of children: Not on file   • Years of education: Not on file   • Highest education level: Not on file   Occupational History   • Not on file   Tobacco Use   • Smoking status: Never   • Smokeless tobacco: Never   • Tobacco comments:     NO TOBACCO USE   Vaping Use   • Vaping Use: Never used   Substance and Sexual Activity   • Alcohol use: No   • Drug use: No   • Sexual activity: Not on file   Other Topics Concern   • Not on file   Social History Narrative   • Not on file     Social Determinants of Health     Financial Resource Strain: Low Risk    • Difficulty of Paying Living Expenses: Not hard at all   Food Insecurity: No Food Insecurity   • Worried About Running Out of Food in the Last Year: Never true   • Ran Out of Food in the Last Year: Never true   Transportation Needs: No Transportation Needs   • Lack of Transportation (Medical): No   • Lack of Transportation (Non-Medical):  No   Physical Activity: Not on file   Stress: Not on file   Social Connections: Not on file   Intimate Partner Violence: Not on file   Housing Stability: Not on file       Current Outpatient Medications:   •  anastrozole (ARIMIDEX) 1 mg tablet, Take 1 tablet (1 mg total) by mouth daily, Disp: 90 tablet, Rfl: 3  •  atorvastatin (LIPITOR) 40 mg tablet, Take 1 tablet (40 mg total) by mouth daily at bedtime, Disp: 30 tablet, Rfl: 2  •  b complex vitamins tablet, Take 1 tablet by mouth daily, Disp: , Rfl:   •  ergocalciferol (VITAMIN D2) 50,000 units, Take 1 capsule (50,000 Units total) by mouth once a week, Disp: 4 capsule, Rfl: 2  •  folic acid (FOLVITE) 1 mg tablet, Take 1 tablet (1,000 mcg total) by mouth daily, Disp: 30 tablet, Rfl: 2  •  losartan (COZAAR) 50 mg tablet, Take 1 tablet (50 mg total) by mouth daily, Disp: 90 tablet, Rfl: 1  •  vitamin B-12 (VITAMIN B-12) 1,000 mcg tablet, Take 1 tablet (1,000 mcg total) by mouth daily, Disp: 90 tablet, Rfl: 1  No Known Allergies    Vitals:    04/26/23 1413   BP: 168/86   Pulse: 76   Temp: (!) 97 4 °F (36 3 °C)   SpO2: 100%       Physical Exam   General: Appears well, appears stated age  Skin: Warm, anicteric  HEENT: Normocephalic, atraumatic; sclera aniceteric, mucous membranes moist; cervical nodes without adenopathy  RIGHT neck firm nodule at inf margin of mandible appreciated  Cardiopulmonary: RRR, Easy WOB, no BLE edema  Abd: Flat and soft, nontender, no masses appreciated, no hepatosplenomegaly  MSK: Symmetric, no cyanosis, no overt weakness  Lymphatic: No cervical, axillary or inguinal lymphadenopathy  Neuro: Affect appropriate, no gross motor abnormalities      Pathology:  Pending    Labs: Reviewed in EPIC    Imaging  CT soft tissue neck w contrast    Result Date: 4/4/2023  Narrative: CT NECK WITH CONTRAST INDICATION:   I88 9: Nonspecific lymphadenitis, unspecified  Right-sided neck swelling for one month  COMPARISON:  None  TECHNIQUE:  Axial, sagittal, and coronal 2D reformatted images were created from the axial source data and submitted for interpretation   Radiation dose length product (DLP) for this visit:  352 mGy-cm   This examination, like all CT scans performed in the [facility], was performed utilizing techniques to minimize radiation dose exposure, including the use of iterative reconstruction and automated exposure control  IV Contrast:  85 mL of iohexol (OMNIPAQUE) IMAGE QUALITY:  Diagnostic  FINDINGS: VISUALIZED BRAIN PARENCHYMA:  No acute intracranial pathology of the visualized brain parenchyma  VISUALIZED ORBITS: Normal visualized orbits  PARANASAL SINUSES: There is chronic opacification of the right sphenoid sinus with surrounding chronic osteitis  The medial bony wall along the petrous portion of the right internal carotid artery also appears dehiscent with the adjacent sphenoid sinus  NASAL CAVITY AND NASOPHARYNX:  Normal  SUPRAHYOID NECK:  There is mild asymmetric prominence of the right palatine tonsil with otherwise no discrete underlying nodular enhancing lesion  Lingual tonsils appear grossly unremarkable  INFRAHYOID NECK:  Aryepiglottic folds and piriform sinuses are normal   Normal glottis and subglottic airway  THYROID GLAND:  There is a right thyroid lobe nodule measuring 1 5 x 1 7 cm on series 3, image 47  PAROTID AND SUBMANDIBULAR GLANDS:  Normal  LYMPH NODES:  There is a large right level 2A lymph node with internal cystic change measuring 2 2 x 3 7 x 4 7 cm  This results in mass effect and anterior displacement of the right submandibular gland  VASCULAR STRUCTURES:  Normal enhancement of the cervical vasculature  THORACIC INLET:  Lung apices and upper mediastinum are unremarkable  BONY STRUCTURES: No acute fracture or destructive osseous lesion  Impression: Large right level 2A lymphadenopathy as described above and suspicious for neoplasm  Correlation with histopathology is recommended  Mild asymmetric prominence of the right palatine tonsil with otherwise no definitive nodular enhancing lesions identified along the course of the aerodigestive tract  Heterogeneous right thyroid lobe nodule  Ultrasound is recommended for further evaluation  I personally discussed this study with Micheal Madrigal on 4/4/2023 at 9:21 AM  Workstation performed: [id number]       I independently reviewed and interpreted the above laboratory and imaging data, including CT of the neck, breast history  Discussion/Summary: This is a 50-year-old female with a large level 2 malignant appearing lymph node  At this juncture, given that she does not have any symptoms concerning for lymphoma and her enlarged lymph nodes are limited to the right neck where she also has a right thyroid nodule, I am concerned about a lymph node involved thyroid cancer  I would like her to undergo an FNA of the right neck node to determine a baseline histology  Likewise, she will need to undergo the thyroid ultrasound she is scheduled for as well as a potential right thyroid biopsy depending on these results  She will then return to the office to discuss further treatment  All questions were answered today

## 2023-04-27 ENCOUNTER — HOSPITAL ENCOUNTER (OUTPATIENT)
Dept: ULTRASOUND IMAGING | Facility: HOSPITAL | Age: 68
Discharge: HOME | End: 2023-04-27

## 2023-04-27 ENCOUNTER — TELEPHONE (OUTPATIENT)
Dept: FAMILY MEDICINE CLINIC | Facility: CLINIC | Age: 68
End: 2023-04-27

## 2023-04-27 DIAGNOSIS — E04.1 RIGHT THYROID NODULE: ICD-10-CM

## 2023-04-27 NOTE — TELEPHONE ENCOUNTER
Telephone call to patient to follow up on consult to surgical oncology  Her appointment was yesterday 4/26/2023  Unable to reach patient  Left message on answering machine

## 2023-05-01 ENCOUNTER — TELEPHONE (OUTPATIENT)
Dept: OTHER | Facility: OTHER | Age: 68
End: 2023-05-01

## 2023-05-01 NOTE — TELEPHONE ENCOUNTER
Patient called in to schedule fu OV post consult 4/26 and US thyroid completed 4/27/23  Please follow up with patient

## 2023-05-02 ENCOUNTER — TELEPHONE (OUTPATIENT)
Dept: HEMATOLOGY ONCOLOGY | Facility: CLINIC | Age: 68
End: 2023-05-02

## 2023-05-02 NOTE — TELEPHONE ENCOUNTER
LM for patient to call back to schedule a 2 week  f/u appointment with Dr Onel Adair after the biopsy on 5/26/23  Let the number for the Oncology [place] to call

## 2023-05-16 ENCOUNTER — TELEPHONE (OUTPATIENT)
Dept: HEMATOLOGY ONCOLOGY | Facility: CLINIC | Age: 68
End: 2023-05-16

## 2023-05-16 NOTE — TELEPHONE ENCOUNTER
Appointment Schedule   Who are you speaking with? self   If it is not the patient, are they listed on an active communication consent form? self   Which provider is the appointment scheduled with? Sofia Albright   At which location is the appointment scheduled for? [facility]   When is the appointment scheduled? Please list date and time 6/6 3:45pm   What is the reason for this appointment? Biopsy follow up   Did patient voice understanding of the details of this appointment? yes   Was the no show policy reviewed with patient?  yes

## 2023-05-22 ENCOUNTER — TELEPHONE (OUTPATIENT)
Dept: HEMATOLOGY ONCOLOGY | Facility: CLINIC | Age: 68
End: 2023-05-22

## 2023-05-22 NOTE — TELEPHONE ENCOUNTER
Appointment Change  Cancel, Reschedule, Change to Virtual      Who are you speaking with? Patient   If it is not the patient, are they listed on an active communication consent form? N/A   Which provider is the appointment scheduled with? Dr Cathy Merida   When is the appointment scheduled? Please list date and time 6/6/23   At which location is the appointment scheduled to take place? [place]   Was the appointment rescheduled or changed from an in person visit to a virtual visit? If so, please list the details of the change  6/7/23   What is the reason for the appointment change? Provider In OR   Was [facility]AR transport scheduled for this visit? No   Does STAR transport need to be scheduled for the new visit (if applicable) No   Does the patient need an infusion appointment rescheduled? No   Does the patient have an infusion appointment scheduled? If so, when? No   Is the patient undergoing chemotherapy? No   Was the no-show policy reviewed for appointments being changed with less then 24 hours of notice?  No

## 2023-05-23 ENCOUNTER — TELEPHONE (OUTPATIENT)
Dept: RADIOLOGY | Facility: HOSPITAL | Age: 68
End: 2023-05-23

## 2023-05-24 ENCOUNTER — TELEPHONE (OUTPATIENT)
Dept: RADIOLOGY | Facility: HOSPITAL | Age: 68
End: 2023-05-24

## 2023-05-24 NOTE — NURSING NOTE
Call placed to pt to discuss upcoming appointment at [facility] Radiology Department---no answer but message left on pts voicemail  Pt is having a R Lymph Node Biopsy completed on 5/26/2023  Pre procedure instructions including diet and taking own medications discussed with pt  Pt instructed that she may eat normally and take medications as usual before the procedure  Reminded pt of the location, date and time of procedure  My number was given to call if any questions or concerns arise pre or post procedure

## 2023-05-26 ENCOUNTER — HOSPITAL ENCOUNTER (OUTPATIENT)
Dept: ULTRASOUND IMAGING | Facility: HOSPITAL | Age: 68
Discharge: HOME | End: 2023-05-26
Attending: STUDENT IN AN ORGANIZED HEALTH CARE EDUCATION/TRAINING PROGRAM

## 2023-05-26 DIAGNOSIS — R59.0 CERVICAL LYMPHADENOPATHY: ICD-10-CM

## 2023-05-26 RX ORDER — LIDOCAINE HYDROCHLORIDE 10 MG/ML
5 INJECTION, SOLUTION EPIDURAL; INFILTRATION; INTRACAUDAL; PERINEURAL ONCE
Status: COMPLETED | OUTPATIENT
Start: 2023-05-26 | End: 2023-05-26

## 2023-05-26 RX ADMIN — LIDOCAINE HYDROCHLORIDE 5 ML: 10 INJECTION, SOLUTION EPIDURAL; INFILTRATION; INTRACAUDAL; PERINEURAL at 10:09

## 2023-06-01 ENCOUNTER — TELEPHONE (OUTPATIENT)
Dept: SURGICAL ONCOLOGY | Facility: CLINIC | Age: 68
End: 2023-06-01

## 2023-06-01 LAB — SCAN RESULT: NORMAL

## 2023-06-01 NOTE — TELEPHONE ENCOUNTER
----- Message from Maria Del Carmen Barriga RN sent at 6/1/2023  9:38 AM EDT -----  Regarding: FW: Appt  Can someone please move Maira's appt up to 3pm or 3:15pm as Dr [employer] has to be in a meeting by Nanci Cuevas? Thanks! !  ----- Message -----  From: Cee Wood MD  Sent: 5/31/2023   7:07 PM EDT  To: Maria Del Carmen Barriga RN  Subject: Appt                                             Can we move her appt up?  I have that meeting starting at 4 -

## 2023-06-01 NOTE — TELEPHONE ENCOUNTER
Called and left message for patient informing her appointment on 6/7/23 is now at 3pm  Provided hope line number to call back if that appointment does not work for her

## 2023-06-07 ENCOUNTER — OFFICE VISIT (OUTPATIENT)
Dept: SURGICAL ONCOLOGY | Facility: CLINIC | Age: 68
End: 2023-06-07
Payer: COMMERCIAL

## 2023-06-07 VITALS
TEMPERATURE: 97.4 F | SYSTOLIC BLOOD PRESSURE: 124 MMHG | OXYGEN SATURATION: 97 % | WEIGHT: 268.6 LBS | DIASTOLIC BLOOD PRESSURE: 77 MMHG | HEIGHT: 66 IN | BODY MASS INDEX: 43.17 KG/M2 | RESPIRATION RATE: 17 BRPM | HEART RATE: 83 BPM

## 2023-06-07 DIAGNOSIS — C73 PRIMARY POORLY DIFFERENTIATED CARCINOMA OF THYROID GLAND (HCC): ICD-10-CM

## 2023-06-07 DIAGNOSIS — E04.1 RIGHT THYROID NODULE: ICD-10-CM

## 2023-06-07 DIAGNOSIS — R59.0 CERVICAL LYMPHADENOPATHY: Primary | ICD-10-CM

## 2023-06-07 PROCEDURE — 99214 OFFICE O/P EST MOD 30 MIN: CPT | Performed by: STUDENT IN AN ORGANIZED HEALTH CARE EDUCATION/TRAINING PROGRAM

## 2023-06-07 NOTE — PROGRESS NOTES
Surgical Oncology Consultation    [address] [facility] ONCOLOGY Mason Aimee  [address] [facility] [id number] [phone]    Patient:  Mariana Hamlin  1955  [id number]    Primary Care provider:  Zully Robertson MD  [address]    Referring provider:  No referring provider defined for this encounter  Diagnoses and all orders for this visit:    Cervical lymphadenopathy    Right thyroid nodule    Primary poorly differentiated carcinoma of thyroid gland ([address] )  -     NM pet ct tumor imaging whole body; Future        Chief Complaint   Patient presents with   • Follow-up       No follow-ups on file  Oncology History Overview Note   Patient returns for first follow-up s/p radiation to the right breast on 4/3/19     62year old female with T2 N0 (stage IIA) invasive ductal carcinoma of the right breast status post breast conservation surgery  Her tumor is ERPR positive her 2-  Oncotype DX returned 13 and Dr Mulu Singh recommended   adjuvant hormone therapy without any chemotherapy  She started anastrozole on 2/1/19 and completed whole right breast radiation followed by boost to the primary site on 4/3/19       5/23/19 Med Onc, Dr Mulu Singh follow-up  Pt tolerating anastrozole well with some hot flashes and arthralgias in her fingers  Plan to continue anastrozole and calcium/Vit D supplement daily  Pt will f/u in 4 months  F/U with Dr Hermes Iverson ? [facility] breast surgeon       Malignant neoplasm of right female breast ([facility] [address] )   11/23/2018 Initial Diagnosis    Malignant neoplasm of right female breast ([facility] [address] )     11/23/2018 Biopsy    Rt Breast US BX 1100 8cmfn, 4 passes 12g Marquee:  - Invasive breast carcinoma of no special type (ductal NST/invasive ductal carcinoma)    - grade 2  - %  - NH 95%  - HER2 negative     12/20/2018 Surgery    Right partial mastectomy and SLN biopsy:  Infiltrating ductal carcinoma, Grade 2/3, felt to be between 2 0 cm and 2 5 cm in greatest dimension  - No invasive tumor is seen at inked margins, but there is a focus of DCIS seen within approximately 1mm of the inked medial margin  - no LVI  - no LCIS  - 0/2 LN's  at least Stage IIA - pT2, pN0, cM0, G2     - Dr Steve Funez     12/20/2018 -  Cancer Staged    Stage IIA - pT2, pN0, cM0, G2        1/4/2019 Genomic Testing    Oncotype DX score: 13     2/1/2019 -  Hormone Therapy    anastrozole 1 mg daily as adjuvant endocrine therapy    - Dr Jenelle Snow       2/14/2019 - 4/3/2019 Radiation    Course: C1    Plan ID Energy Fractions Dose per Fraction (cGy) Dose Correction (cGy) Total Dose Delivered (cGy) Elapsed Days   R Breast 10X/6X 25 / 25 200 0 5,000 40   R BRST BOOST 16E 5 / 5 200 0 1,000 7      Treatment dates:  C1: 2/14/2019 - 4/3/2019           History of Present Illness  : This is a 18-year-old female seen today with a new right neck nodule  Briefly, the patient presented to her primary care physician due to what she describes as throat soreness  A large right neck nodule was identified, prompting a CT scan of the neck  This demonstrated a near 5 cm right level 2 lymph node with multicystic components  It also detected a right heterogeneous thyroid nodule of about 2 cm which did not appear to be locally invasive  The patient denies any significant symptoms of throat pain, trouble breathing, trouble swallowing, pain with swallowing, voice changes  She does describe a sore throat for months if not years  She states that the right sided neck nodule became enlarged over the last couple of months but it has not really bothered her  She does have a history of right breast cancer treated with breast conservation in 2018  Her mammograms since that time have been benign  She denies any other lumps or bumps of the left neck, axilla, inguinal regions  She denies any fevers, chills, night sweats, weight loss, other systemic symptoms      At this juncture, given that she does not have any symptoms concerning for lymphoma and her enlarged lymph nodes are limited to the right neck where she also has a right thyroid nodule, I am concerned about a lymph node involved thyroid cancer  I would like her to undergo an FNA of the right neck node to determine a baseline histology  Likewise, she will need to undergo the thyroid ultrasound she is scheduled for as well as a potential right thyroid biopsy depending on these results  Interval 6/7/23  Patient presents today for interval follow-up after the above investigations  Thyroid ultrasound revealed a spongiform nodule with questionable criteria for biopsy  Percutaneous FNA of the enlarged right cervical node revealed carcinoma, however, they were not able to determine tissue of etiology given the scant tissue  The patient's symptoms are largely unchanged  Review of Systems  Complete ROS Surg Onc:   Constitutional: The patient denies new or recent history of general fatigue, no recent weight loss, no change in appetite  Eyes: No complaints of visual problems, no scleral icterus  ENT: No complaints of ear pain, no hoarseness, no difficulty swallowing,  no tinnitus and no new masses in head, oral cavity, or neck  Cardiovascular: No complaints of chest pain, no palpitations, no ankle edema  Respiratory: No complaints of shortness of breath, no cough  Gastrointestinal: No complaints of jaundice, no bloody stools, no pale stools  Genitourinary: No complaints of dysuria, no hematuria, no nocturia, no frequent urination, no urethral discharge  Musculoskeletal: No complaints of weakness, paralysis, joint stiffness or arthralgias  Integumentary: No complaints of rash, no new lesions  Neurological: No complaints of convulsions, no seizures, no dizziness  Hematologic/Lymphatic: No complaints of easy bruising  Endocrine:  No hot or cold intolerance    No polydipsia, polyphagia, or polyuria  Allergy/immunology:  No environmental allergies  No food allergies  Not immunocompromised        Patient Active Problem List   Diagnosis   • Primary hypertension   • Vasomotor rhinitis   • Mixed hyperlipidemia   • Screen for colon cancer   • Malignant neoplasm of right female breast [facility])   • Vitamin D deficiency   • Encounter for screening for HIV   • Prediabetes   • Cervical lymphadenopathy   • Follow-up examination   • Right thyroid nodule     Past Medical History:   Diagnosis Date   • Anemia    • Arthritis    • Breast cancer ([facility] [address] ) 12/17/2018   • [facility] )     right breast   • GERD (gastroesophageal reflux disease)    • Hyperlipidemia    • Hypertension    • Stroke [facility])     TIA    • Transaminitis 9/22/2021     Past Surgical History:   Procedure Laterality Date   • BREAST BIOPSY Right 11/23/2018    us guided bx cancer   • BREAST SURGERY     • ID MASTECTOMY PARTIAL W/AXILLARY LYMPHADENECTOMY Right 12/20/2018    Procedure: ULTRASOUND LOCALIZED PARTIAL MASTECTOMY W/SENTINEL NODE BIOPSY POSS AXILLARY DISSECTION;  Surgeon: Luis Ramirez MD;  Location:  MAIN OR;  Service: General   • TUBAL LIGATION     • US GUIDED BREAST BIOPSY RIGHT COMPLETE Right 11/23/2018   • US GUIDED INJECTION FOR RESEARCH STUDY  12/20/2018   • [address] STUDY  12/7/2018   • US GUIDED INJECTION FOR RESEARCH STUDY  5/3/2019   • US GUIDED LYMPH NODE BIOPSY RIGHT  5/26/2023     Family History   Problem Relation Age of Onset   • Colon cancer Mother 62   • Hypertension Mother    • Cancer Father    • Pancreatic cancer Father 61   • Hypertension Son    • Hypertension Daughter      Social History     Socioeconomic History   • Marital status: Single     Spouse name: Not on file   • Number of children: Not on file   • Years of education: Not on file   • Highest education level: Not on file   Occupational History   • Not on file   Tobacco Use   • Smoking status: Never   • Smokeless tobacco: Never   • Tobacco comments:     NO TOBACCO USE   Vaping Use   • Vaping Use: Never used   Substance and Sexual Activity   • Alcohol use: No   • Drug use: No   • Sexual activity: Not on file   Other Topics Concern   • Not on file   Social History Narrative   • Not on file     Social Determinants of Health     Financial Resource Strain: Low Risk  (7/7/2022)    Overall Financial Resource Strain (CARDIA)    • Difficulty of Paying Living Expenses: Not hard at all   Food Insecurity: No Food Insecurity (7/7/2022)    Hunger Vital Sign    • Worried About Running Out of Food in the Last Year: Never true    • Ran Out of Food in the Last Year: Never true   Transportation Needs: No Transportation Needs (7/7/2022)    PRAPARE - Transportation    • Lack of Transportation (Medical): No    • Lack of Transportation (Non-Medical):  No   Physical Activity: Not on file   Stress: Not on file   Social Connections: Not on file   Intimate Partner Violence: Not on file   Housing Stability: Not on file       Current Outpatient Medications:   •  anastrozole (ARIMIDEX) 1 mg tablet, Take 1 tablet (1 mg total) by mouth daily, Disp: 90 tablet, Rfl: 3  •  atorvastatin (LIPITOR) 40 mg tablet, Take 1 tablet (40 mg total) by mouth daily at bedtime, Disp: 30 tablet, Rfl: 2  •  b complex vitamins tablet, Take 1 tablet by mouth daily, Disp: , Rfl:   •  ergocalciferol (VITAMIN D2) 50,000 units, Take 1 capsule (50,000 Units total) by mouth once a week, Disp: 4 capsule, Rfl: 2  •  folic acid (FOLVITE) 1 mg tablet, Take 1 tablet (1,000 mcg total) by mouth daily, Disp: 30 tablet, Rfl: 2  •  losartan (COZAAR) 50 mg tablet, Take 1 tablet (50 mg total) by mouth daily, Disp: 90 tablet, Rfl: 1  •  vitamin B-12 (VITAMIN B-12) 1,000 mcg tablet, Take 1 tablet (1,000 mcg total) by mouth daily, Disp: 90 tablet, Rfl: 1  No Known Allergies    Vitals:    06/07/23 1512   BP: 124/77   Pulse: 83   Resp: 17   Temp: (!) 97 4 °F (36 3 °C)   SpO2: 97%       Physical Exam   General: Appears well, appears stated age  Skin: Warm, anicteric  HEENT: Normocephalic, atraumatic; sclera aniceteric, mucous membranes moist; cervical nodes without adenopathy  RIGHT neck firm nodule at inf margin of mandible appreciated  Cardiopulmonary: RRR, Easy WOB, no BLE edema  Abd: Flat and soft, nontender, no masses appreciated, no hepatosplenomegaly  MSK: Symmetric, no cyanosis, no overt weakness  Lymphatic: No cervical, axillary or inguinal lymphadenopathy  Neuro: Affect appropriate, no gross motor abnormalities      Pathology:  Pending    Labs: Reviewed in EPIC    Imaging  CT soft tissue neck w contrast    Result Date: 4/4/2023  Narrative: CT NECK WITH CONTRAST INDICATION:   I88 9: Nonspecific lymphadenitis, unspecified  Right-sided neck swelling for one month  COMPARISON:  None  TECHNIQUE:  Axial, sagittal, and coronal 2D reformatted images were created from the axial source data and submitted for interpretation  Radiation dose length product (DLP) for this visit:  352 mGy-cm   This examination, like all CT scans performed in the [facility], was performed utilizing techniques to minimize radiation dose exposure, including the use of iterative reconstruction and automated exposure control  IV Contrast:  85 mL of iohexol (OMNIPAQUE) IMAGE QUALITY:  Diagnostic  FINDINGS: VISUALIZED BRAIN PARENCHYMA:  No acute intracranial pathology of the visualized brain parenchyma  VISUALIZED ORBITS: Normal visualized orbits  PARANASAL SINUSES: There is chronic opacification of the right sphenoid sinus with surrounding chronic osteitis  The medial bony wall along the petrous portion of the right internal carotid artery also appears dehiscent with the adjacent sphenoid sinus  NASAL CAVITY AND NASOPHARYNX:  Normal  SUPRAHYOID NECK:  There is mild asymmetric prominence of the right palatine tonsil with otherwise no discrete underlying nodular enhancing lesion  Lingual tonsils appear grossly unremarkable   INFRAHYOID NECK: Aryepiglottic folds and piriform sinuses are normal   Normal glottis and subglottic airway  THYROID GLAND:  There is a right thyroid lobe nodule measuring 1 5 x 1 7 cm on series 3, image 47  PAROTID AND SUBMANDIBULAR GLANDS:  Normal  LYMPH NODES:  There is a large right level 2A lymph node with internal cystic change measuring 2 2 x 3 7 x 4 7 cm  This results in mass effect and anterior displacement of the right submandibular gland  VASCULAR STRUCTURES:  Normal enhancement of the cervical vasculature  THORACIC INLET:  Lung apices and upper mediastinum are unremarkable  BONY STRUCTURES: No acute fracture or destructive osseous lesion  Impression: Large right level 2A lymphadenopathy as described above and suspicious for neoplasm  Correlation with histopathology is recommended  Mild asymmetric prominence of the right palatine tonsil with otherwise no definitive nodular enhancing lesions identified along the course of the aerodigestive tract  Heterogeneous right thyroid lobe nodule  Ultrasound is recommended for further evaluation  I personally discussed this study with Inna Olguin on 4/4/2023 at 9:21 AM  Workstation performed: [id number]       I independently reviewed and interpreted the above laboratory and imaging data, including CT of the neck, breast history, pathology, thyroid US      Discussion/Summary: This is a 21-year-old female with a large level 2 malignant appearing lymph node  FNA of this node was nonrevealing for tissue etiology, but it did reveal carcinoma  At this juncture, I would like to pursue PET scan, as the thyroid nodule within the thyroid was not particularly concerning for malignancy  Likewise, I would like to do an incisional biopsy to obtain more tissue to determine tissue of etiology  The risks of the procedure were described to include infection, bleeding, seroma formation, temporary or permanent nerve involvement    The patient understands these risks and would like to proceed

## 2023-06-07 NOTE — H&P (VIEW-ONLY)
Surgical Oncology Consultation    [address] [facility] ONCOLOGY [address]  [facility] [address] [phone]    Patient:  Ainsley Moody  1955  [id number]    Primary Care provider:  Antonia Orellana MD  [address]    Referring provider:  No referring provider defined for this encounter  Diagnoses and all orders for this visit:    Cervical lymphadenopathy    Right thyroid nodule    Primary poorly differentiated carcinoma of thyroid gland ([address] )  -     NM pet ct tumor imaging whole body; Future        Chief Complaint   Patient presents with   • Follow-up       No follow-ups on file  Oncology History Overview Note   Patient returns for first follow-up s/p radiation to the right breast on 4/3/19     62year old female with T2 N0 (stage IIA) invasive ductal carcinoma of the right breast status post breast conservation surgery  Her tumor is ERPR positive her 2-  Oncotype DX returned 13 and Dr Doreen Arzola recommended   adjuvant hormone therapy without any chemotherapy  She started anastrozole on 2/1/19 and completed whole right breast radiation followed by boost to the primary site on 4/3/19       5/23/19 Med Onc, Dr Doreen Arzola follow-up  Pt tolerating anastrozole well with some hot flashes and arthralgias in her fingers  Plan to continue anastrozole and calcium/Vit D supplement daily  Pt will f/u in 4 months  F/U with Dr Kristopher Olivarez ? [facility] breast surgeon       Malignant neoplasm of right female breast ([facility] [address] )   11/23/2018 Initial Diagnosis    Malignant neoplasm of right female breast ([address] )     11/23/2018 Biopsy    Rt Breast US BX 1100 8cmfn, 4 passes 12g Marquee:  - Invasive breast carcinoma of no special type (ductal NST/invasive ductal carcinoma)    - grade 2  - %  - VA 95%  - HER2 negative     12/20/2018 Surgery    Right partial mastectomy and SLN biopsy:  Infiltrating ductal carcinoma, Grade 2/3, felt to be between 2 0 cm and 2 5 cm in greatest dimension  - No invasive tumor is seen at inked margins, but there is a focus of DCIS seen within approximately 1mm of the inked medial margin  - no LVI  - no LCIS  - 0/2 LN's  at least Stage IIA - pT2, pN0, cM0, G2     - Dr Tara Dickerson     12/20/2018 -  Cancer Staged    Stage IIA - pT2, pN0, cM0, G2        1/4/2019 Genomic Testing    Oncotype DX score: 13     2/1/2019 -  Hormone Therapy    anastrozole 1 mg daily as adjuvant endocrine therapy    - Dr Diane Powell       2/14/2019 - 4/3/2019 Radiation    Course: C1    Plan ID Energy Fractions Dose per Fraction (cGy) Dose Correction (cGy) Total Dose Delivered (cGy) Elapsed Days   R Breast 10X/6X 25 / 25 200 0 5,000 40   R BRST BOOST 16E 5 / 5 200 0 1,000 7      Treatment dates:  C1: 2/14/2019 - 4/3/2019           History of Present Illness  : This is a 67-year-old female seen today with a new right neck nodule  Briefly, the patient presented to her primary care physician due to what she describes as throat soreness  A large right neck nodule was identified, prompting a CT scan of the neck  This demonstrated a near 5 cm right level 2 lymph node with multicystic components  It also detected a right heterogeneous thyroid nodule of about 2 cm which did not appear to be locally invasive  The patient denies any significant symptoms of throat pain, trouble breathing, trouble swallowing, pain with swallowing, voice changes  She does describe a sore throat for months if not years  She states that the right sided neck nodule became enlarged over the last couple of months but it has not really bothered her  She does have a history of right breast cancer treated with breast conservation in 2018  Her mammograms since that time have been benign  She denies any other lumps or bumps of the left neck, axilla, inguinal regions  She denies any fevers, chills, night sweats, weight loss, other systemic symptoms      At this juncture, given that she does not have any symptoms concerning for lymphoma and her enlarged lymph nodes are limited to the right neck where she also has a right thyroid nodule, I am concerned about a lymph node involved thyroid cancer  I would like her to undergo an FNA of the right neck node to determine a baseline histology  Likewise, she will need to undergo the thyroid ultrasound she is scheduled for as well as a potential right thyroid biopsy depending on these results  Interval 6/7/23  Patient presents today for interval follow-up after the above investigations  Thyroid ultrasound revealed a spongiform nodule with questionable criteria for biopsy  Percutaneous FNA of the enlarged right cervical node revealed carcinoma, however, they were not able to determine tissue of etiology given the scant tissue  The patient's symptoms are largely unchanged  Review of Systems  Complete ROS Surg Onc:   Constitutional: The patient denies new or recent history of general fatigue, no recent weight loss, no change in appetite  Eyes: No complaints of visual problems, no scleral icterus  ENT: No complaints of ear pain, no hoarseness, no difficulty swallowing,  no tinnitus and no new masses in head, oral cavity, or neck  Cardiovascular: No complaints of chest pain, no palpitations, no ankle edema  Respiratory: No complaints of shortness of breath, no cough  Gastrointestinal: No complaints of jaundice, no bloody stools, no pale stools  Genitourinary: No complaints of dysuria, no hematuria, no nocturia, no frequent urination, no urethral discharge  Musculoskeletal: No complaints of weakness, paralysis, joint stiffness or arthralgias  Integumentary: No complaints of rash, no new lesions  Neurological: No complaints of convulsions, no seizures, no dizziness  Hematologic/Lymphatic: No complaints of easy bruising  Endocrine:  No hot or cold intolerance    No polydipsia, polyphagia, or polyuria  Allergy/immunology:  No environmental allergies  No food allergies  Not immunocompromised        Patient Active Problem List   Diagnosis   • Primary hypertension   • Vasomotor rhinitis   • Mixed hyperlipidemia   • Screen for colon cancer   • Malignant neoplasm of right female breast [facility])   • Vitamin D deficiency   • Encounter for screening for HIV   • Prediabetes   • Cervical lymphadenopathy   • Follow-up examination   • Right thyroid nodule     Past Medical History:   Diagnosis Date   • Anemia    • Arthritis    • Breast cancer ([facility] [address] ) 12/17/2018   • Cancer ([facility] )     right breast   • GERD (gastroesophageal reflux disease)    • Hyperlipidemia    • Hypertension    • Stroke [facility])     TIA    • Transaminitis 9/22/2021     Past Surgical History:   Procedure Laterality Date   • BREAST BIOPSY Right 11/23/2018    us guided bx cancer   • BREAST SURGERY     • WY MASTECTOMY PARTIAL W/AXILLARY LYMPHADENECTOMY Right 12/20/2018    Procedure: ULTRASOUND LOCALIZED PARTIAL MASTECTOMY W/SENTINEL NODE BIOPSY POSS AXILLARY DISSECTION;  Surgeon: Mary Madera MD;  Location:  MAIN OR;  Service: General   • TUBAL LIGATION     • US GUIDED BREAST BIOPSY RIGHT COMPLETE Right 11/23/2018   • US GUIDED INJECTION FOR RESEARCH STUDY  12/20/2018   • [address] STUDY  12/7/2018   • US GUIDED INJECTION FOR RESEARCH STUDY  5/3/2019   • US GUIDED LYMPH NODE BIOPSY RIGHT  5/26/2023     Family History   Problem Relation Age of Onset   • Colon cancer Mother 62   • Hypertension Mother    • Cancer Father    • Pancreatic cancer Father 61   • Hypertension Son    • Hypertension Daughter      Social History     Socioeconomic History   • Marital status: Single     Spouse name: Not on file   • Number of children: Not on file   • Years of education: Not on file   • Highest education level: Not on file   Occupational History   • Not on file   Tobacco Use   • Smoking status: Never   • Smokeless tobacco: Never   • Tobacco comments:     NO TOBACCO USE   Vaping Use   • Vaping Use: Never used   Substance and Sexual Activity   • Alcohol use: No   • Drug use: No   • Sexual activity: Not on file   Other Topics Concern   • Not on file   Social History Narrative   • Not on file     Social Determinants of Health     Financial Resource Strain: Low Risk  (7/7/2022)    Overall Financial Resource Strain (CARDIA)    • Difficulty of Paying Living Expenses: Not hard at all   Food Insecurity: No Food Insecurity (7/7/2022)    Hunger Vital Sign    • Worried About Running Out of Food in the Last Year: Never true    • Ran Out of Food in the Last Year: Never true   Transportation Needs: No Transportation Needs (7/7/2022)    PRAPARE - Transportation    • Lack of Transportation (Medical): No    • Lack of Transportation (Non-Medical):  No   Physical Activity: Not on file   Stress: Not on file   Social Connections: Not on file   Intimate Partner Violence: Not on file   Housing Stability: Not on file       Current Outpatient Medications:   •  anastrozole (ARIMIDEX) 1 mg tablet, Take 1 tablet (1 mg total) by mouth daily, Disp: 90 tablet, Rfl: 3  •  atorvastatin (LIPITOR) 40 mg tablet, Take 1 tablet (40 mg total) by mouth daily at bedtime, Disp: 30 tablet, Rfl: 2  •  b complex vitamins tablet, Take 1 tablet by mouth daily, Disp: , Rfl:   •  ergocalciferol (VITAMIN D2) 50,000 units, Take 1 capsule (50,000 Units total) by mouth once a week, Disp: 4 capsule, Rfl: 2  •  folic acid (FOLVITE) 1 mg tablet, Take 1 tablet (1,000 mcg total) by mouth daily, Disp: 30 tablet, Rfl: 2  •  losartan (COZAAR) 50 mg tablet, Take 1 tablet (50 mg total) by mouth daily, Disp: 90 tablet, Rfl: 1  •  vitamin B-12 (VITAMIN B-12) 1,000 mcg tablet, Take 1 tablet (1,000 mcg total) by mouth daily, Disp: 90 tablet, Rfl: 1  No Known Allergies    Vitals:    06/07/23 1512   BP: 124/77   Pulse: 83   Resp: 17   Temp: (!) 97 4 °F (36 3 °C)   SpO2: 97%       Physical Exam   General: Appears well, appears stated age  Skin: Warm, anicteric  HEENT: Normocephalic, atraumatic; sclera aniceteric, mucous membranes moist; cervical nodes without adenopathy  RIGHT neck firm nodule at inf margin of mandible appreciated  Cardiopulmonary: RRR, Easy WOB, no BLE edema  Abd: Flat and soft, nontender, no masses appreciated, no hepatosplenomegaly  MSK: Symmetric, no cyanosis, no overt weakness  Lymphatic: No cervical, axillary or inguinal lymphadenopathy  Neuro: Affect appropriate, no gross motor abnormalities      Pathology:  Pending    Labs: Reviewed in EPIC    Imaging  CT soft tissue neck w contrast    Result Date: 4/4/2023  Narrative: CT NECK WITH CONTRAST INDICATION:   I88 9: Nonspecific lymphadenitis, unspecified  Right-sided neck swelling for one month  COMPARISON:  None  TECHNIQUE:  Axial, sagittal, and coronal 2D reformatted images were created from the axial source data and submitted for interpretation  Radiation dose length product (DLP) for this visit:  352 mGy-cm   This examination, like all CT scans performed in the [facility], was performed utilizing techniques to minimize radiation dose exposure, including the use of iterative reconstruction and automated exposure control  IV Contrast:  85 mL of iohexol (OMNIPAQUE) IMAGE QUALITY:  Diagnostic  FINDINGS: VISUALIZED BRAIN PARENCHYMA:  No acute intracranial pathology of the visualized brain parenchyma  VISUALIZED ORBITS: Normal visualized orbits  PARANASAL SINUSES: There is chronic opacification of the right sphenoid sinus with surrounding chronic osteitis  The medial bony wall along the petrous portion of the right internal carotid artery also appears dehiscent with the adjacent sphenoid sinus  NASAL CAVITY AND NASOPHARYNX:  Normal  SUPRAHYOID NECK:  There is mild asymmetric prominence of the right palatine tonsil with otherwise no discrete underlying nodular enhancing lesion  Lingual tonsils appear grossly unremarkable   INFRAHYOID NECK: Aryepiglottic folds and piriform sinuses are normal   Normal glottis and subglottic airway  THYROID GLAND:  There is a right thyroid lobe nodule measuring 1 5 x 1 7 cm on series 3, image 47  PAROTID AND SUBMANDIBULAR GLANDS:  Normal  LYMPH NODES:  There is a large right level 2A lymph node with internal cystic change measuring 2 2 x 3 7 x 4 7 cm  This results in mass effect and anterior displacement of the right submandibular gland  VASCULAR STRUCTURES:  Normal enhancement of the cervical vasculature  THORACIC INLET:  Lung apices and upper mediastinum are unremarkable  BONY STRUCTURES: No acute fracture or destructive osseous lesion  Impression: Large right level 2A lymphadenopathy as described above and suspicious for neoplasm  Correlation with histopathology is recommended  Mild asymmetric prominence of the right palatine tonsil with otherwise no definitive nodular enhancing lesions identified along the course of the aerodigestive tract  Heterogeneous right thyroid lobe nodule  Ultrasound is recommended for further evaluation  I personally discussed this study with Domingo Mckinnon on 4/4/2023 at 9:21 AM  Workstation performed: [id number]       I independently reviewed and interpreted the above laboratory and imaging data, including CT of the neck, breast history, pathology, thyroid US      Discussion/Summary: This is a 76-year-old female with a large level 2 malignant appearing lymph node  FNA of this node was nonrevealing for tissue etiology, but it did reveal carcinoma  At this juncture, I would like to pursue PET scan, as the thyroid nodule within the thyroid was not particularly concerning for malignancy  Likewise, I would like to do an incisional biopsy to obtain more tissue to determine tissue of etiology  The risks of the procedure were described to include infection, bleeding, seroma formation, temporary or permanent nerve involvement    The patient understands these risks and would like to proceed

## 2023-06-08 ENCOUNTER — TELEPHONE (OUTPATIENT)
Dept: SURGICAL ONCOLOGY | Facility: CLINIC | Age: 68
End: 2023-06-08

## 2023-06-08 RX ORDER — TRAMADOL HYDROCHLORIDE 50 MG/1
50 TABLET ORAL EVERY 6 HOURS PRN
Qty: 10 TABLET | Refills: 0 | Status: SHIPPED | OUTPATIENT
Start: 2023-06-08

## 2023-06-08 NOTE — TELEPHONE ENCOUNTER
Spoke to patient to move pet ct up before 6/23 surgery date with Dr Phoenix Broussard- Pt was ok with moving Pet CT Monday 6/19 at 9:30 am is new NM Pet CT

## 2023-06-08 NOTE — TELEPHONE ENCOUNTER
I called and spoke to patient in regards to her surgery date  I explained that unfortunately due to her insurance processing time, June 13th will not work for surgery  I provided her a new date of 6/23 at the [address] [place] [address] and she agreed  She was concerned about her PET being scheduled on June 27th  I let her know I would talk to Dr Yinka Elena and Rigo Parker to see what they would like to do and get back to her  Patient was appreciative of the call

## 2023-06-13 ENCOUNTER — OFFICE VISIT (OUTPATIENT)
Dept: LAB | Facility: HOSPITAL | Age: 68
End: 2023-06-13
Payer: COMMERCIAL

## 2023-06-13 ENCOUNTER — APPOINTMENT (OUTPATIENT)
Dept: LAB | Facility: HOSPITAL | Age: 68
End: 2023-06-13
Payer: COMMERCIAL

## 2023-06-13 ENCOUNTER — HOSPITAL ENCOUNTER (OUTPATIENT)
Dept: RADIOLOGY | Facility: HOSPITAL | Age: 68
Discharge: HOME | End: 2023-06-13
Payer: COMMERCIAL

## 2023-06-13 DIAGNOSIS — R73.03 PREDIABETES: ICD-10-CM

## 2023-06-13 DIAGNOSIS — I88.9 CERVICAL LYMPHADENITIS: ICD-10-CM

## 2023-06-13 DIAGNOSIS — R59.0 CERVICAL LYMPHADENOPATHY: ICD-10-CM

## 2023-06-13 DIAGNOSIS — I10 PRIMARY HYPERTENSION: ICD-10-CM

## 2023-06-13 LAB
ALBUMIN SERPL BCP-MCNC: 4 G/DL (ref 3.5–5)
ALP SERPL-CCNC: 123 U/L (ref 34–104)
ALT SERPL W P-5'-P-CCNC: 39 U/L (ref 7–52)
ANION GAP SERPL CALCULATED.3IONS-SCNC: 10 MMOL/L (ref 4–13)
AST SERPL W P-5'-P-CCNC: 44 U/L (ref 13–39)
ATRIAL RATE: 91 BPM
BASOPHILS # BLD AUTO: 0.06 THOUSANDS/ÂΜL (ref 0–0.1)
BASOPHILS NFR BLD AUTO: 1 % (ref 0–1)
BILIRUB SERPL-MCNC: 0.51 MG/DL (ref 0.2–1)
BUN SERPL-MCNC: 13 MG/DL (ref 5–25)
CALCIUM SERPL-MCNC: 9.9 MG/DL (ref 8.4–10.2)
CHLORIDE SERPL-SCNC: 103 MMOL/L (ref 96–108)
CHOLEST SERPL-MCNC: 222 MG/DL
CO2 SERPL-SCNC: 26 MMOL/L (ref 21–32)
CREAT SERPL-MCNC: 0.88 MG/DL (ref 0.6–1.3)
EOSINOPHIL # BLD AUTO: 0.28 THOUSAND/ÂΜL (ref 0–0.61)
EOSINOPHIL NFR BLD AUTO: 3 % (ref 0–6)
ERYTHROCYTE [DISTWIDTH] IN BLOOD BY AUTOMATED COUNT: 13.9 % (ref 11.6–15.1)
GFR SERPL CREATININE-BSD FRML MDRD: 68 ML/MIN/1.73SQ M
GLUCOSE P FAST SERPL-MCNC: 116 MG/DL (ref 65–99)
HCT VFR BLD AUTO: 35.2 % (ref 34.8–46.1)
HDLC SERPL-MCNC: 44 MG/DL
HGB BLD-MCNC: 11.5 G/DL (ref 11.5–15.4)
HIV 1+2 AB+HIV1 P24 AG SERPL QL IA: NORMAL
HIV 2 AB SERPL QL IA: NORMAL
HIV1 AB SERPL QL IA: NORMAL
HIV1 P24 AG SERPL QL IA: NORMAL
IMM GRANULOCYTES # BLD AUTO: 0.03 THOUSAND/UL (ref 0–0.2)
IMM GRANULOCYTES NFR BLD AUTO: 0 % (ref 0–2)
LDLC SERPL CALC-MCNC: 150 MG/DL (ref 0–100)
LYMPHOCYTES # BLD AUTO: 2.23 THOUSANDS/ÂΜL (ref 0.6–4.47)
LYMPHOCYTES NFR BLD AUTO: 21 % (ref 14–44)
MCH RBC QN AUTO: 27.1 PG (ref 26.8–34.3)
MCHC RBC AUTO-ENTMCNC: 32.7 G/DL (ref 31.4–37.4)
MCV RBC AUTO: 83 FL (ref 82–98)
MONOCYTES # BLD AUTO: 0.54 THOUSAND/ÂΜL (ref 0.17–1.22)
MONOCYTES NFR BLD AUTO: 5 % (ref 4–12)
NEUTROPHILS # BLD AUTO: 7.48 THOUSANDS/ÂΜL (ref 1.85–7.62)
NEUTS SEG NFR BLD AUTO: 70 % (ref 43–75)
NRBC BLD AUTO-RTO: 0 /100 WBCS
P AXIS: 50 DEGREES
PLATELET # BLD AUTO: 385 THOUSANDS/UL (ref 149–390)
PMV BLD AUTO: 9.9 FL (ref 8.9–12.7)
POTASSIUM SERPL-SCNC: 4.2 MMOL/L (ref 3.5–5.3)
PR INTERVAL: 146 MS
PROT SERPL-MCNC: 8.4 G/DL (ref 6.4–8.4)
QRS AXIS: 42 DEGREES
QRSD INTERVAL: 76 MS
QT INTERVAL: 368 MS
QTC INTERVAL: 452 MS
RBC # BLD AUTO: 4.25 MILLION/UL (ref 3.81–5.12)
SODIUM SERPL-SCNC: 139 MMOL/L (ref 135–147)
T WAVE AXIS: 29 DEGREES
TRIGL SERPL-MCNC: 140 MG/DL
VENTRICULAR RATE: 91 BPM
WBC # BLD AUTO: 10.62 THOUSAND/UL (ref 4.31–10.16)

## 2023-06-13 PROCEDURE — 71046 X-RAY EXAM CHEST 2 VIEWS: CPT

## 2023-06-13 PROCEDURE — 80053 COMPREHEN METABOLIC PANEL: CPT

## 2023-06-13 PROCEDURE — 80061 LIPID PANEL: CPT

## 2023-06-13 PROCEDURE — 36415 COLL VENOUS BLD VENIPUNCTURE: CPT

## 2023-06-13 PROCEDURE — 83036 HEMOGLOBIN GLYCOSYLATED A1C: CPT

## 2023-06-13 PROCEDURE — 93010 ELECTROCARDIOGRAM REPORT: CPT | Performed by: STUDENT IN AN ORGANIZED HEALTH CARE EDUCATION/TRAINING PROGRAM

## 2023-06-13 PROCEDURE — 93005 ELECTROCARDIOGRAM TRACING: CPT

## 2023-06-13 PROCEDURE — 87389 HIV-1 AG W/HIV-1&-2 AB AG IA: CPT

## 2023-06-13 PROCEDURE — 85025 COMPLETE CBC W/AUTO DIFF WBC: CPT

## 2023-06-13 PROCEDURE — 86480 TB TEST CELL IMMUN MEASURE: CPT

## 2023-06-14 LAB
EST. AVERAGE GLUCOSE BLD GHB EST-MCNC: 123 MG/DL
HBA1C MFR BLD: 5.9 %

## 2023-06-17 LAB
GAMMA INTERFERON BACKGROUND BLD IA-ACNC: 0.04 IU/ML
M TB IFN-G BLD-IMP: NEGATIVE
M TB IFN-G CD4+ BCKGRND COR BLD-ACNC: 0 IU/ML
M TB IFN-G CD4+ BCKGRND COR BLD-ACNC: 0 IU/ML
MITOGEN IGNF BCKGRD COR BLD-ACNC: >10 IU/ML

## 2023-06-19 ENCOUNTER — HOSPITAL ENCOUNTER (OUTPATIENT)
Dept: RADIOLOGY | Age: 68
Discharge: HOME | End: 2023-06-19
Payer: COMMERCIAL

## 2023-06-19 DIAGNOSIS — C73 PRIMARY POORLY DIFFERENTIATED CARCINOMA OF THYROID GLAND (HCC): ICD-10-CM

## 2023-06-19 LAB — GLUCOSE SERPL-MCNC: 106 MG/DL (ref 65–140)

## 2023-06-19 PROCEDURE — A9552 F18 FDG: HCPCS

## 2023-06-19 PROCEDURE — G1004 CDSM NDSC: HCPCS

## 2023-06-19 PROCEDURE — 78815 PET IMAGE W/CT SKULL-THIGH: CPT

## 2023-06-19 PROCEDURE — 82948 REAGENT STRIP/BLOOD GLUCOSE: CPT

## 2023-06-19 RX ORDER — LORAZEPAM 2 MG/ML
1 INJECTION INTRAMUSCULAR ONCE
Status: COMPLETED | OUTPATIENT
Start: 2023-06-19 | End: 2023-06-19

## 2023-06-19 RX ADMIN — LORAZEPAM 1 MG: 2 INJECTION INTRAMUSCULAR; INTRAVENOUS at 09:23

## 2023-06-19 NOTE — LETTER
[address] [place] [address]  [address] [facility] [address]      June 23, 2023    MRN: [id number]     Phone: [phone]     Dear Ms Jackelyn De Luna recently had a(n) Nuclear Medicine performed on 6/19/2023 at  [address] [place] [address] that was requested by Chucky Mcardle, MD  The study was reviewed by a radiologist, which is a physician who specializes in medical imaging  The radiologist issued a report describing his or her findings  In that report there was a finding that the radiologist felt warranted further discussion with your health care provider and that discussion would be beneficial to you  The results were sent to Chucky Mcardle, MD on 06/20/2023  8:30 AM  We recommend that you contact Chucky Mcardle, MD at [phone] or set up an appointment to discuss the results of the imaging test  If you have already heard from Chucky Mcardle, MD regarding the results of your study, you can disregard this letter  This letter is not meant to alarm you, but intended to encourage you to follow-up on your results with the provider that sent you for the imaging study  In addition, we have enclosed answers to frequently asked questions by other patients who have also received a letter to review results with their health care provider (see page two)  Thank you for choosing [address] [place] [address] for your medical imaging needs  FREQUENTLY ASKED QUESTIONS    Why am I receiving this letter? [address] [place] [address] requires us to notify patients who have findings on imaging exams that may require more testing or follow-up with a health professional within the next 3 months          How serious is the finding on the imaging test?  This letter is sent to all patients who may need follow-up or more testing within the next 3 months  Receiving this letter does not necessarily mean you have a life-threatening imaging finding or disease  Recommendations in the radiologist’s imaging report are general in nature and it is up to your healthcare provider to say whether those recommendations make sense for your situation  You are strongly encouraged to talk to your health care provider about the results and ask whether additional steps need to be taken  Where can I get a copy of the final report for my recent radiology exam?  To get a full copy of the report you can access your records online at [email or web address] or please contact [facility] Medical Records Department at [phone] Monday through Friday between 8 am and 6 pm          What do I need to do now? Please contact your health care provider who requested the imaging study to discuss what further actions (if any) are needed  You may have already heard from (your ordering provider) in regard to this test in which case you can disregard this letter  NOTICE IN ACCORDANCE WITH THE [place] STATE “PATIENT TEST RESULT INFORMATION ACT OF 2018”    You are receiving this notice as a result of a determination by your diagnostic imaging service that further discussions of your test results are warranted and would be beneficial to you  The complete results of your test or tests have been or will be sent to the health care practitioner that ordered the test or tests  It is recommended that you contact your health care practitioner to discuss your results as soon as possible

## 2023-06-20 ENCOUNTER — PREP FOR PROCEDURE (OUTPATIENT)
Dept: INTERVENTIONAL RADIOLOGY/VASCULAR | Facility: CLINIC | Age: 68
End: 2023-06-20

## 2023-06-20 ENCOUNTER — TELEPHONE (OUTPATIENT)
Dept: OTHER | Facility: HOSPITAL | Age: 68
End: 2023-06-20

## 2023-06-20 DIAGNOSIS — K63.89 MASS OF COLON: Primary | ICD-10-CM

## 2023-06-20 DIAGNOSIS — R16.0 LIVER MASS: Primary | ICD-10-CM

## 2023-06-20 DIAGNOSIS — J35.8 TONSIL ASYMMETRY: ICD-10-CM

## 2023-06-20 DIAGNOSIS — N94.89 ADNEXAL MASS: ICD-10-CM

## 2023-06-20 DIAGNOSIS — R16.0 LIVER MASS: ICD-10-CM

## 2023-06-20 DIAGNOSIS — R59.0 CERVICAL LYMPHADENOPATHY: ICD-10-CM

## 2023-06-20 NOTE — TELEPHONE ENCOUNTER
Called pt to discuss results of PET  Hypermetabolic RT cervical chain nodes as well as avidity of the RT tonsil and to a lesser extent the LFT tonsil  States she has started having discomfort at the back of the throat but continues to have no trouble swallowing  Needs to see ENT ASAP as the RT neck LAD may be related to a primary tonsil/tongue neoplasm  Also discussed GI symptoms given colon avidity  Has never had a colonoscopy but reports no changes to BM, no blood  I am concerned she has a primary colon cancer that may have spread to the liver  Will also obtain IR biopsy of liver lesion  Finally, will need better imaging of both liver mets as well as pelvic mass - will obtain MR of abd and pelvis  Will cancel incisional node biopsy as I do not think this is best next step given PET findings  All questions answered

## 2023-06-21 ENCOUNTER — TELEPHONE (OUTPATIENT)
Dept: SURGICAL ONCOLOGY | Facility: CLINIC | Age: 68
End: 2023-06-21

## 2023-06-21 NOTE — TELEPHONE ENCOUNTER
----- Message from Jonatan Ryan RN sent at 6/21/2023  8:03 AM EDT -----  Good morning,   Dr Stephania Morrissey spoke with the pt yesterday and ordered an MRI for her  Can someone please get her scheduled for this and then a f/u appt with Dr Stephania Morrissey after the MRI is done?  Thank you!!

## 2023-06-21 NOTE — TELEPHONE ENCOUNTER
Called and scheduled MRI for 7/6/23 at 7:30pm at [place]  Called and spoke with patient  Informed her of MRI appointment and prep  Also scheduled follow up with Dr Israel for 7/24/23 at 11:15am at [place] office  She was agreeable

## 2023-06-21 NOTE — TELEPHONE ENCOUNTER
----- Message from Jazmin Rosales RN sent at 6/21/2023  8:03 AM EDT -----  Good morning,   Dr Bairon Simmons spoke with the pt yesterday and ordered an MRI for her  Can someone please get her scheduled for this and then a f/u appt with Dr Bairon Simmons after the MRI is done?  Thank you!!

## 2023-06-27 ENCOUNTER — HOSPITAL ENCOUNTER (OUTPATIENT)
Dept: INTERVENTIONAL RADIOLOGY/VASCULAR | Facility: HOSPITAL | Age: 68
Discharge: HOME | End: 2023-06-27
Attending: RADIOLOGY | Admitting: RADIOLOGY
Payer: COMMERCIAL

## 2023-06-27 VITALS
SYSTOLIC BLOOD PRESSURE: 151 MMHG | HEART RATE: 68 BPM | TEMPERATURE: 97.7 F | BODY MASS INDEX: 43.07 KG/M2 | RESPIRATION RATE: 20 BRPM | OXYGEN SATURATION: 100 % | WEIGHT: 268 LBS | DIASTOLIC BLOOD PRESSURE: 83 MMHG | HEIGHT: 66 IN

## 2023-06-27 DIAGNOSIS — R16.0 LIVER MASS: ICD-10-CM

## 2023-06-27 LAB
ERYTHROCYTE [DISTWIDTH] IN BLOOD BY AUTOMATED COUNT: 14.1 % (ref 11.6–15.1)
HCT VFR BLD AUTO: 32 % (ref 34.8–46.1)
HGB BLD-MCNC: 10.3 G/DL (ref 11.5–15.4)
INR PPP: 1.08 (ref 0.84–1.19)
MCH RBC QN AUTO: 26.7 PG (ref 26.8–34.3)
MCHC RBC AUTO-ENTMCNC: 32.2 G/DL (ref 31.4–37.4)
MCV RBC AUTO: 83 FL (ref 82–98)
PLATELET # BLD AUTO: 319 THOUSANDS/UL (ref 149–390)
PMV BLD AUTO: 9.9 FL (ref 8.9–12.7)
PROTHROMBIN TIME: 14.7 SECONDS (ref 11.6–14.5)
RBC # BLD AUTO: 3.86 MILLION/UL (ref 3.81–5.12)
WBC # BLD AUTO: 9.45 THOUSAND/UL (ref 4.31–10.16)

## 2023-06-27 PROCEDURE — 99153 MOD SED SAME PHYS/QHP EA: CPT

## 2023-06-27 PROCEDURE — 88307 TISSUE EXAM BY PATHOLOGIST: CPT | Performed by: PATHOLOGY

## 2023-06-27 PROCEDURE — 47000 NEEDLE BIOPSY OF LIVER PERQ: CPT

## 2023-06-27 PROCEDURE — 99152 MOD SED SAME PHYS/QHP 5/>YRS: CPT

## 2023-06-27 PROCEDURE — 88341 IMHCHEM/IMCYTCHM EA ADD ANTB: CPT | Performed by: PATHOLOGY

## 2023-06-27 PROCEDURE — 88333 PATH CONSLTJ SURG CYTO XM 1: CPT | Performed by: PATHOLOGY

## 2023-06-27 PROCEDURE — 85027 COMPLETE CBC AUTOMATED: CPT | Performed by: RADIOLOGY

## 2023-06-27 PROCEDURE — 88342 IMHCHEM/IMCYTCHM 1ST ANTB: CPT | Performed by: PATHOLOGY

## 2023-06-27 PROCEDURE — 76942 ECHO GUIDE FOR BIOPSY: CPT

## 2023-06-27 PROCEDURE — 85610 PROTHROMBIN TIME: CPT | Performed by: RADIOLOGY

## 2023-06-27 RX ORDER — LIDOCAINE HYDROCHLORIDE 10 MG/ML
INJECTION, SOLUTION EPIDURAL; INFILTRATION; INTRACAUDAL; PERINEURAL AS NEEDED
Status: COMPLETED | OUTPATIENT
Start: 2023-06-27 | End: 2023-06-27

## 2023-06-27 RX ORDER — MIDAZOLAM HYDROCHLORIDE 2 MG/2ML
INJECTION, SOLUTION INTRAMUSCULAR; INTRAVENOUS AS NEEDED
Status: COMPLETED | OUTPATIENT
Start: 2023-06-27 | End: 2023-06-27

## 2023-06-27 RX ORDER — FENTANYL CITRATE 50 UG/ML
INJECTION, SOLUTION INTRAMUSCULAR; INTRAVENOUS AS NEEDED
Status: COMPLETED | OUTPATIENT
Start: 2023-06-27 | End: 2023-06-27

## 2023-06-27 RX ADMIN — MIDAZOLAM 0.5 MG: 1 INJECTION INTRAMUSCULAR; INTRAVENOUS at 11:21

## 2023-06-27 RX ADMIN — FENTANYL CITRATE 50 MCG: 50 INJECTION, SOLUTION INTRAMUSCULAR; INTRAVENOUS at 11:13

## 2023-06-27 RX ADMIN — MIDAZOLAM 1 MG: 1 INJECTION INTRAMUSCULAR; INTRAVENOUS at 11:13

## 2023-06-27 RX ADMIN — FENTANYL CITRATE 25 MCG: 50 INJECTION, SOLUTION INTRAMUSCULAR; INTRAVENOUS at 11:31

## 2023-06-27 RX ADMIN — LIDOCAINE HYDROCHLORIDE 10 ML: 10 INJECTION, SOLUTION EPIDURAL; INFILTRATION; INTRACAUDAL; PERINEURAL at 11:33

## 2023-06-27 RX ADMIN — MIDAZOLAM 1 MG: 1 INJECTION INTRAMUSCULAR; INTRAVENOUS at 11:39

## 2023-06-27 RX ADMIN — FENTANYL CITRATE 25 MCG: 50 INJECTION, SOLUTION INTRAMUSCULAR; INTRAVENOUS at 11:21

## 2023-06-27 NOTE — BRIEF OP NOTE (RAD/CATH)
INTERVENTIONAL RADIOLOGY PROCEDURE NOTE    Date: 6/27/2023    Procedure: Liver biopsy  Procedure Summary     Date: 06/27/23 Room / Location: [address] [place] [address] Interventional Radiology    Anesthesia Start:  Anesthesia Stop:     Procedure: IR BIOPSY LIVER MASS Diagnosis:       Liver mass      (hypermetabolic liver masses)    Scheduled Providers:  Responsible Provider:     Anesthesia Type: Not recorded ASA Status: Not recorded          Preoperative diagnosis:   1  Liver mass         Postoperative diagnosis: Same  Surgeon: Amol Daniels MD     Assistant: None  No qualified resident was available  Blood loss: None    Specimens: Liver mass in right lobe     Findings: US guided 18 G core biopsy x 3 of right hepatic lobe lesion with echogenic central area  Specimen deemed adequate  D-Stat slurry placed in tract  Complications: None immediate      Anesthesia: conscious sedation

## 2023-06-27 NOTE — INTERVAL H&P NOTE
Update: (This section must be completed if the H&P was completed greater than 24 hrs to procedure or admission)    H&P reviewed  After examining the patient, I find no changed to the H&P since it had been written  Patient re-evaluated   Accept as history and physical     Amol Daniels MD/June 27, 2023/12:01 PM

## 2023-06-27 NOTE — SEDATION DOCUMENTATION
US guided liver biopsy completed in IR by Dr Simone Mark  Bedrest start time 1155  (First 2 hours on right side    Remaining 2 hours in position of comfort )

## 2023-06-27 NOTE — DISCHARGE INSTRUCTIONS
Percutaneous Liver Biopsy   WHAT YOU NEED TO KNOW:   A PLB is a procedure to remove a sample of tissue from your liver  The sample can be sent to a lab and tested for liver disease, cancer, or infection  After the procedure you may have pain and bruising at the biopsy site  You may also have pain in your right shoulder  These symptoms should get better in 48 to 72 hours  DISCHARGE INSTRUCTIONS:     [address] and [place] patients,  Contact Interventional Radiology at [phone] PATIENTS: Contact Interventional Radiology at [phone]   [facility] PATIENTS: Contact Interventional Radiology at [phone] if:    Fever greater than 101 or chills  You have severe pain in your abdomen  Your abdomen is larger than usual and feels hard  Your neck is more swollen and you have trouble swallowing  You feel weak or dizzy  Your heart is beating faster than usual    Your pain does not get better after you take pain medicine  Your wound is red, swollen, or draining pus  You have nausea or are vomiting  Your skin is itchy, swollen, or you have a rash  You have questions or concerns about your condition or care  Medicines:   Acetaminophen decreases pain and fever  It is available without a doctor's order  Acetaminophen can cause liver damage if not taken correctly  Take your home medicine as directed  Resume your normal diet  Small sips of flat soda will help with mild nausea  Self-care:   Rest as directed  Do not play sports, exercise, or lift anything heavier than 5 pounds for up to 1 week  Apply firm, steady pressure if bleeding occurs  A small amount of bleeding from your wound is possible  Apply pressure with a clean gauze or towel for 5 to 10 minutes  Call 911 if bleeding becomes heavy or does not stop  Ask your healthcare provider when to take your blood thinner or antiplatelet medicine  You may need to wait 24 to 72 hours to take your medicine   This will prevent bleeding  Follow up with your healthcare provider as directed: Write down your questions so you remember to ask them during your visits  Procedural Sedation   WHAT YOU NEED TO KNOW:   Procedural sedation is medicine used during procedures to help you feel relaxed and calm  You will remember little to none of the procedure  After sedation you may feel tired, weak, or unsteady on your feet  You may also have trouble concentrating or short-term memory loss  These symptoms should go away in 24 hours or less  DISCHARGE INSTRUCTIONS:     Call 911 or have someone else call for any of the following: You have sudden trouble breathing  You cannot be woken  Contact Interventional Radiology at [phone]   [place] PATIENTS: Contact Interventional Radiology at [phone]) Kyleigh Banegas PATIENTS: Contact Interventional Radiology at [phone]) if any of the following occur: You have a severe headache or dizziness  Your heart is beating faster than usual     You have a fever or chills  Your skin is itchy, swollen, or you have a rash  You have nausea or are vomiting for more than 8 hours after the procedure  You have questions or concerns about your condition or care  Self-care:   Have someone stay with you for 24 hours  This person can drive you to errands and help you do things around the house  This person can also watch for problems  Rest and do quiet activities for 24 hours  Do not exercise, ride a bike, or play sports  Stand up slowly to prevent dizziness and falls  Take short walks around the house with another person  Slowly return to your usual activities the next day  Do not drive or use dangerous machines or tools for 24 hours  You may injure yourself or others  Examples include a lawnmower, saw, or drill  Do not return to work for 24 hours if you use dangerous machines or tools for work  Do not make important decisions for 24 hours   For example, do not sign important papers or invest money  Drink liquids as directed  Liquids help flush the sedation medicine out of your body  Ask how much liquid to drink each day and which liquids are best for you  Eat small, frequent meals to prevent nausea and vomiting  Start with clear liquids such as juice or broth  If you do not vomit after clear liquids, you can eat your usual foods  Do not drink alcohol or take medicines that make you drowsy  This includes medicines that help you sleep and anxiety medicines  Ask your healthcare provider if it is safe for you to take pain medicine  Follow up with your healthcare provider as directed: Write down your questions so you remember to ask them during your visits

## 2023-06-29 ENCOUNTER — TELEPHONE (OUTPATIENT)
Dept: SURGICAL ONCOLOGY | Facility: CLINIC | Age: 68
End: 2023-06-29

## 2023-06-29 ENCOUNTER — TELEPHONE (OUTPATIENT)
Age: 68
End: 2023-06-29

## 2023-06-29 DIAGNOSIS — C18.9 METASTATIC COLON CANCER TO LIVER (HCC): Primary | ICD-10-CM

## 2023-06-29 DIAGNOSIS — C78.7 METASTATIC COLON CANCER TO LIVER (HCC): Primary | ICD-10-CM

## 2023-06-29 PROCEDURE — 88341 IMHCHEM/IMCYTCHM EA ADD ANTB: CPT | Performed by: PATHOLOGY

## 2023-06-29 PROCEDURE — 88333 PATH CONSLTJ SURG CYTO XM 1: CPT | Performed by: PATHOLOGY

## 2023-06-29 PROCEDURE — 88307 TISSUE EXAM BY PATHOLOGIST: CPT | Performed by: PATHOLOGY

## 2023-06-29 PROCEDURE — 88342 IMHCHEM/IMCYTCHM 1ST ANTB: CPT | Performed by: PATHOLOGY

## 2023-06-29 NOTE — TELEPHONE ENCOUNTER
Patient is being referred by Dr Jsoe Pierce for mass of colon, please assist in scheduling a sooner appointment than next available

## 2023-06-29 NOTE — TELEPHONE ENCOUNTER
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

## 2023-06-29 NOTE — TELEPHONE ENCOUNTER
----- Message from Herbert Hernandez RN sent at 6/29/2023  3:19 PM EDT -----  Hi,   Dr Obdulio Mcgrath would like to see this pt in 8 weeks from today for a f/u appt  Can someone please call her to schedule this? Thanks so much!

## 2023-06-30 ENCOUNTER — DOCUMENTATION (OUTPATIENT)
Dept: HEMATOLOGY ONCOLOGY | Facility: CLINIC | Age: 68
End: 2023-06-30

## 2023-06-30 ENCOUNTER — TELEPHONE (OUTPATIENT)
Dept: HEMATOLOGY ONCOLOGY | Facility: CLINIC | Age: 68
End: 2023-06-30

## 2023-06-30 ENCOUNTER — CONSULT (OUTPATIENT)
Dept: GASTROENTEROLOGY | Facility: CLINIC | Age: 68
End: 2023-06-30
Payer: COMMERCIAL

## 2023-06-30 VITALS
RESPIRATION RATE: 16 BRPM | HEART RATE: 85 BPM | BODY MASS INDEX: 42.46 KG/M2 | HEIGHT: 66 IN | DIASTOLIC BLOOD PRESSURE: 88 MMHG | OXYGEN SATURATION: 98 % | SYSTOLIC BLOOD PRESSURE: 135 MMHG | TEMPERATURE: 97.8 F | WEIGHT: 264.2 LBS

## 2023-06-30 DIAGNOSIS — R94.8 ABNORMAL PET SCAN OF COLON: Primary | ICD-10-CM

## 2023-06-30 DIAGNOSIS — R12 HEARTBURN: ICD-10-CM

## 2023-06-30 DIAGNOSIS — Z80.0 FAMILY HISTORY OF COLON CANCER: ICD-10-CM

## 2023-06-30 RX ORDER — OMEPRAZOLE 20 MG/1
20 CAPSULE, DELAYED RELEASE ORAL DAILY
Qty: 30 CAPSULE | Refills: 5 | Status: SHIPPED | OUTPATIENT
Start: 2023-06-30

## 2023-06-30 RX ORDER — BISACODYL 5 MG/1
TABLET, DELAYED RELEASE ORAL
Qty: 2 TABLET | Refills: 0 | Status: SHIPPED | OUTPATIENT
Start: 2023-06-30

## 2023-06-30 NOTE — PROGRESS NOTES
[facility] - Outpatient Consultation  Varinder Juarez 80 y o  female MRN: [id number]  Encounter: [id number]    ASSESSMENT AND PLAN:      1  Abnormal PET scan of colon  2  Family history of colon cancer    Patient had underwent expedited evaluation for cervical lymphadenopathy with lymph node biopsy suggestive of carcinoma though non-specific  Recent PET CT noting numerous hypermetabolic lesions in the chest abdomen and pelvis, notably in the liver and ascending colon, with an area of concentric mural thickening concerning for malignancy  She has a first-degree relative with colon cancer and no prior colonoscopy on file  Recent liver lesion biopsy confirming adenocarcinoma suggestive of colorectal source  She has no obstructive symptoms to the GI tract at this time  We reviewed with patient the indication for colonoscopy at this time to evaluate for any intraluminal pathology including that noted on the PET/CT  We reviewed that should be an expedited evaluation and we have sent in the GoLytely bowel preparation for her  Risks associated with endoscopic evaluation discussed with patient today, including but not limited to bleeding, infection, perforation, and organ injury, all of which are low  Pt demonstrates understanding and is agreeable to the plan  I have sent a note to our [place] team to help schedule a colonoscopy for patient within the next 1 to 2 weeks close to her home  Additional recommendations pending colonoscopy results and recommendation of surgical oncologist Dr Ya Nassar  We also discussed with patient today the importance of colon cancer screening in any of her first-degree relatives  - Ambulatory referral to Gastroenterology  - polyethylene glycol (GOLYTELY) 4000 mL solution; Take 4,000 mL by mouth once for 1 dose  Dispense: 4000 mL; Refill: 0  - bisacodyl (DULCOLAX) 5 mg EC tablet;  Take as directed according to bowel prep instructions  Dispense: 2 tablet; Refill: 0  - Colonoscopy; Future  - omeprazole (PriLOSEC) 20 mg delayed release capsule; Take 1 capsule (20 mg total) by mouth daily  Dispense: 30 capsule; Refill: 5    3  Heartburn     During review of systems, patient does note history of heartburn and indigestion for which she takes over-the-counter antacids  We did discuss we can send in a prescription for her to take daily for symptom control  No alarm features to the upper GI tract at this time  Continue with diet and lifestyle modifications for GERD  We will follow-up with patient after colonoscopy is completed  Follow-up should be scheduled in the [facility]  ______________________________________________________________________    HPI: Patient is a 80 y o  female who presents today for a consultation regarding abnormal findings on PET/CT  Past medical history significant for breast cancer, family history of colon cancer, prediabetes, HLD, BMI 37  Patient is new to clinic  She was referred by surgical oncology  She initially presented to the surgical oncology office in 06/2023 for evaluation of new right neck nodules, which pt initially noted in 02/2023  She underwent an FNA of the enlarged right cervical node which revealed carcinoma, however tissue etiology was not able to be determined  She subsequently underwent a PET/CT which commented on multifocal hypermetabolic malignancy/metastatic disease involving the neck, abdomen, and chest, including liver lesions and and a hypermetabolic concentric mural thickening of the proximal to mid ascending colon suspicious for malignancy  She most recently underwent a liver biopsy with the pathology returning less than 24 hours ago which commented on metastatic adenocarcinoma suggestive of a colorectal primary  Patient is here today with her daughter  She shares that she is moving her bowels just about daily  She denies any constipation or diarrhea    She denies any change in stool "caliber over the past couple of months  She notes that she gets \"stomach aches\" sometimes in the lower abdomen, which do not seem to be related to oral intake or defecation  She denies any BRBPR or melena  She has lost approximately 10 pounds in the past 6 months  She shares her mother had colon cancer diagnosed in her 62s  She has never had a colonoscopy in the past     She does have a history of heartburn for which she takes an over the counter antacid with good effect  She denies any breakthrough symptoms of indigestion, nausea, vomiting, dysphagia or odynophagia  She is not on any anticoagulants, no use of supplemental O2, no CKD, no pacemaker/defibrillator, no adverse reaction anesthesia in the past     06/2023: Hb 11 5, HCT 32, MCV 83, platelets 389, INR 0 67, BUN 13, creatinine 0 88, AST 44, ALT 39, , albumin 4 0, T  bili 0 51  06/2023: PET CT: In the abdomen, there is multifocal hypermetabolic hepatic malignancy/metastatic disease  Additionally, there is focal hypermetabolic concentric mural thickening of the proximal to mid ascending colon suspicious for colonic malignancy which can be   further characterized with direct visualization/colonoscopy  06/2023: A  Liver, Biopsy: Metastatic adenocarcinoma, suggestive of colorectal primary  Background hepatic steatosis  05/2023: FNA:A  & B  Lymph Node, Right Level II A: (Thin-Prep and smears) Suspicious for malignancy  Clusters of atypical epithelioid cells in a background of lymphoid cells, suspicious for carcinoma       Review of Systems   Otherwise Per HPI    Historical Information   Past Medical History:   Diagnosis Date   • Anemia    • Arthritis    • Breast cancer ([facility] [address] ) 12/17/2018   • Cancer ([facility] [address] )     right breast   • GERD (gastroesophageal reflux disease)    • Hyperlipidemia    • Hypertension    • Stroke [facility])     TIA    • Transaminitis 9/22/2021     Past Surgical History:   Procedure Laterality Date   • BREAST BIOPSY Right 11/23/2018    us " "guided bx cancer   • BREAST SURGERY     • IR BIOPSY LIVER MASS  6/27/2023   • PA MASTECTOMY PARTIAL W/AXILLARY LYMPHADENECTOMY Right 12/20/2018    Procedure: ULTRASOUND LOCALIZED PARTIAL MASTECTOMY W/SENTINEL NODE BIOPSY POSS AXILLARY DISSECTION;  Surgeon: Barry Brown MD;  Location: [address] [place] [address] OR;  Service: General   • TUBAL LIGATION     • US GUIDED BREAST BIOPSY RIGHT COMPLETE Right 11/23/2018   • US GUIDED INJECTION FOR RESEARCH STUDY  12/20/2018   • US GUIDED INJECTION FOR RESEARCH STUDY  12/7/2018   • US GUIDED INJECTION FOR RESEARCH STUDY  5/3/2019   • US GUIDED LYMPH NODE BIOPSY RIGHT  5/26/2023     Social History   Social History     Substance and Sexual Activity   Alcohol Use No     Social History     Substance and Sexual Activity   Drug Use No     Social History     Tobacco Use   Smoking Status Never   Smokeless Tobacco Never   Tobacco Comments    NO TOBACCO USE     Family History   Problem Relation Age of Onset   • Colon cancer Mother 62   • Hypertension Mother    • Cancer Father    • Pancreatic cancer Father 61   • Hypertension Son    • Hypertension Daughter        Meds/Allergies       Current Outpatient Medications:   •  anastrozole (ARIMIDEX) 1 mg tablet  •  atorvastatin (LIPITOR) 40 mg tablet  •  losartan (COZAAR) 50 mg tablet  •  b complex vitamins tablet  •  ergocalciferol (VITAMIN D2) 17,444 units  •  folic acid (FOLVITE) 1 mg tablet  •  traMADol (Ultram) 50 mg tablet  •  vitamin B-12 (VITAMIN B-12) 1,000 mcg tablet    No Known Allergies        Objective     Blood pressure 135/88, pulse 85, temperature 97 8 °F (36 6 °C), temperature source Tympanic, resp  rate 16, height 5' 6\" (1 676 m), weight 120 kg (264 lb 3 2 oz), SpO2 98 %, not currently breastfeeding  Body mass index is 42 64 kg/m²  Physical Exam  Vitals and nursing note reviewed  Constitutional:       Appearance: She is well-developed  She is obese  HENT:      Head: Normocephalic and atraumatic     Eyes:      General: No scleral " icterus  Conjunctiva/sclera: Conjunctivae normal    Cardiovascular:      Rate and Rhythm: Normal rate  Pulmonary:      Effort: Pulmonary effort is normal  No respiratory distress  Abdominal:      General: Bowel sounds are normal  There is no distension  Tenderness: There is no abdominal tenderness  There is no guarding or rebound  Lymphadenopathy:      Cervical: Cervical adenopathy present  Skin:     General: Skin is warm and dry  Coloration: Skin is not jaundiced  Neurological:      General: No focal deficit present  Mental Status: She is alert and oriented to person, place, and time  Psychiatric:         Mood and Affect: Mood normal          Behavior: Behavior normal         Lab Results:   No visits with results within 1 Day(s) from this visit     Latest known visit with results is:   Hospital Outpatient Visit on 06/27/2023   Component Date Value   • Protime 06/27/2023 14 7 (H)    • INR 06/27/2023 1 08    • WBC 06/27/2023 9 45    • RBC 06/27/2023 3 86    • Hemoglobin 06/27/2023 10 3 (L)    • Hematocrit 06/27/2023 32 0 (L)    • MCV 06/27/2023 83    • MCH 06/27/2023 26 7 (L)    • MCHC 06/27/2023 32 2    • RDW 06/27/2023 14 1    • Platelets 32/48/9722 319    • MPV 06/27/2023 9 9    • Case Report 06/27/2023                      Value:Surgical Pathology Report                         Case: [id number]                                   Authorizing Provider:  Elvin Villeda MD       Collected:           06/27/2023 1143              Ordering Location:     [address]     Received:            06/27/2023 [address]                                                                               Radiology                                                                    Pathologist:           Krystian Sandhu MD                                                                  Specimen:    Liver • Final Diagnosis 06/27/2023                      Value: This result contains rich text formatting which cannot be displayed here  • Microscopic Description 06/27/2023                      Value: This result contains rich text formatting which cannot be displayed here  • Note 06/27/2023                      Value: This result contains rich text formatting which cannot be displayed here  • Additional Information 06/27/2023                      Value: This result contains rich text formatting which cannot be displayed here  • Intraoperative Consultat* 06/27/2023                      Value: This result contains rich text formatting which cannot be displayed here  • Gross Description 06/27/2023                      Value: This result contains rich text formatting which cannot be displayed here  • Clinical Information 06/27/2023                      Value:hypermetabolic liver mass, colon mass, history breast CA    PET/CT (6/19/2023)  1  Multifocal hypermetabolic malignancy/metastatic disease involving the neck, abdomen, and likely chest      Specifically in the neck, there is hypermetabolic cervical lymphadenopathy as well as hypermetabolic asymmetric soft tissue prominence to the right tonsillar region for which correlation with direct visualization and possibly tissue sampling is recommended to exclude the possibility of underlying right tonsillar malignancy  Additional nonspecific hypermetabolic soft tissue is noted in the left tonsillar region extending to the base of the tongue/vallecular regions  Patient's known right thyroid lobe nodule is non-FDG avid and remains of uncertain clinical significance      In the chest, there is a 0 8 cm minimally to mildly FDG avid right lower lobe nodule suspicious for malignancy/metastatic disease      In the abdomen, there is multifocal hypermetabolic hepatic malignancy/metastatic disease                             Additionally, there is focal hypermetabolic concentric mural thickening of the proximal to mid ascending colon suspicious for colonic malignancy which can be further characterized with direct visualization/colonoscopy      2  Indeterminant 7 8 cm minimally to mildly FDG avid lobulated right pelvic mass in the right adnexal region which is poorly characterized on low-dose unenhanced CT and could reflect exophytic uterine fibroid or right adnexal/ovarian mass  Further evaluation with pelvic ultrasound (MRI if needed) is recommended for further characterization as clinically indicated  Radiology Results:   IR biopsy liver mass    Result Date: 6/27/2023  Narrative: Ultrasound-guided  biopsy of the liver lesions Clinical History: Patient with history of breast cancer  Now with PET positive liver lesions for biopsy    Technique: The patient was brought to the ultrasound area and placed supine on the stretcher  After a brief ultrasound examination was performed of the liver, and correlated with prior imaging, a site on the upper abdominal wall laterally was prepped and  draped in usual sterile fashion  Lidocaine was administered to the skin and a 17 G coaxial needle was advanced into the right hepatic lobe lesion under direct ultrasound guidance  Three 18-gauge specimens were obtained  The specimen was found to be adequate  A full details A pathology report will follow  D-Stat slurry placed through the coaxial needle  The patient tolerated the procedure well and suffered no complications  Impression: Impression: Successful fine needle aspiration biopsy of right hepatic lobe lesion yielding an adequate specimen   A full report will follow  Workstation performed: [id number]     NM PET CT skull base to mid thigh    Result Date: 6/20/2023  Narrative: WHOLE-BODY PET/CT SCAN INDICATION: C73:  Malignant neoplasm of thyroid gland The following clinical information was obtained directly from EPIC: h/o newly dx carcinoma of the Rt neck, unknown primary site  Recent CT showed heterogenous Rt thyroid lobe nodule (possible primary) and a mild asymmetric prominence of Rt palantine tonsil  Initial staging  h/o Rt breast ca  s/p Rt partial mastectomy/SLN bx 12/20/18 (+ infiltrating ductal carcinoma, ER/MI +/HER2-)  Received RT to Rt breast 4/2019; pt continues on daily Anastrozole MODIFIER: PI COMPARISON: CT of neck dated 3/30/2023, thyroid ultrasound dated 4/27/2023, CELL TYPE:  suspicious for malignancy; clusters of atypical epithelioid cells (bx 5/26/23 Rt neck LN; level 2 +) TECHNIQUE: Following the intravenous administration of 12 9 mCi F-18-FDG, dedicated head and neck images were obtained from the skull vertex to the thoracic inlet with the patient's arms at their side 60 minutes post injection  Subsequently, whole body images were obtained from the thoracic inlet through the proximal femurs with the patients arms above their head  Multiplanar attenuation corrected and non attenuation corrected PET images are available for interpretation  Contiguous, low dose, axial CT sections were also obtained from the head through the thoracic inlet and from the thoracic inlet through the proximal femurs  Intravenous contrast material was not utilized  Additional coronal and sagittal images are available as well as fused PET/CT images  This examination, like all CT scans performed in the [facility], was performed utilizing techniques to minimize radiation dose exposure, including the use of iterative reconstruction and automated exposure control  Fasting serum glucose: 106 mg/dl FINDINGS: VISUALIZED BRAIN: No acute abnormalities are seen   HEAD/NECK: On image 38 of series 17 there is asymmetric nodular FDG activity involving the right tonsil region where there appears to be slight asymmetric nodular soft tissue measuring 1 5 cm with max SUV of 32; given the asymmetric nodular appearance of the right tonsil region, further evaluation with direct visualization and possibly tissue sampling is recommended to exclude tonsillar carcinoma  There is additional prominent tracer activity involving the left tonsillar region and symmetric hypermetabolic soft tissue involving the base of the tongue extending to the vallecular regions of uncertain clinical significance and possibly inflammatory in etiology with underlying hypermetabolic malignancy in these locations not excluded  These regions can also be further characterized with direct visualization as clinically indicated  There is right-sided hypermetabolic cervical lymphadenopathy most consistent with cervical claudio metastatic disease with primary malignancy/lymphoproliferative malignancy not excluded  Representative hypermetabolic lymph node is noted in the right level 2 region on image 42 of series 17 measuring approximately 3 3 x 2 4 cm with max SUV of 22  Additional smaller hypermetabolic right cervical lymph nodes are noted extending posteriorly and inferiorly from this dominant right level 2 cervical lymph node subjacent to the right sternocleidomastoid muscle  There are mildly prominent, borderline enlarged mildly FDG avid left cervical lymph nodes of uncertain clinical significance; for example, on image 44 of series 17 there is a left level 2 cervical lymph node measuring 0 9 cm in short axis with max SUV of  4 1; while findings could reflect benign inflammatory/reactive lymph nodes, findings are suspicious for developing left-sided cervical claudio metastatic disease  Patient's known right-sided heterogeneous thyroid nodule is seen on image 61 of series 17 measuring approximately 2 1 cm without focal FDG activity  This thyroid nodule remains of uncertain clinical significance CT images: Intracranial atherosclerotic calcification is noted   CHEST: On image 55 of series 3 there is a 0 8 cm right lower lobe thyroid nodule with slight focal FDG activity with max SUV of 2 2 suspicious but not definitive for hypermetabolic malignancy/metastatic disease  CT images: Atherosclerotic vascular calcifications including those of the coronary arteries are noted  ABDOMEN: There are multiple (almost 10) hypermetabolic hepatic masses most consistent with hypermetabolic metastatic disease which are poorly characterized on low-dose unenhanced CT  Representative hypermetabolic hepatic mass measures approximate 4 4 cm on image 89 of series 3 involving the left hepatic lobe with max SUV of 17  Best seen on image 141 of series 3 is a short segment of intense focal FDG activity involving the proximal to mid ascending colon where there is mild concentric mural thickening and mild infiltration of the adjacent mesenteric fat with max SUV of 20 0 highly suspicious for hypermetabolic colonic malignancy with differential diagnosis including less likely but not excluded focal colitis  CT images: Cholelithiasis  Atherosclerotic vascular calcifications are noted  Small hiatal hernia  PELVIS: Best seen on image 173 of series 3 is a lobulated large right pelvic mass in the right adnexal region measuring 7 8 x 5 4 cm which exhibits minimal to mild patchy FDG activity and is of uncertain clinical significance with differential diagnosis including exophytic fibroid versus right adnexal/ovarian mass  This finding can be further characterized with pelvic ultrasound (MRI if needed)  CT images: Unremarkable  OSSEOUS STRUCTURES: No FDG avid lesions are  seen  CT images: Degenerative changes are noted involving the spine  Impression: 1   Multifocal hypermetabolic malignancy/metastatic disease involving the neck, abdomen, and likely chest  Specifically in the neck, there is hypermetabolic cervical lymphadenopathy as well as hypermetabolic asymmetric soft tissue prominence to the right tonsillar region for which correlation with direct visualization and possibly tissue sampling is recommended to exclude the possibility of underlying right tonsillar malignancy  Additional nonspecific hypermetabolic soft tissue is noted in the left tonsillar region extending to the base of the tongue/vallecular regions  Patient's known right thyroid  lobe nodule is non-FDG avid and remains of uncertain clinical significance  In the chest, there is a 0 8 cm minimally to mildly FDG avid right lower lobe nodule suspicious for malignancy/metastatic disease  In the abdomen, there is multifocal hypermetabolic hepatic malignancy/metastatic disease  Additionally, there is focal hypermetabolic concentric mural thickening of the proximal to mid ascending colon suspicious for colonic malignancy which can be further characterized with direct visualization/colonoscopy  2  Indeterminant 7 8 cm minimally to mildly FDG avid lobulated right pelvic mass in the right adnexal region which is poorly characterized on low-dose unenhanced CT and could reflect exophytic uterine fibroid or right adnexal/ovarian mass  Further evaluation with pelvic ultrasound (MRI if needed) is recommended for further characterization as clinically indicated  Please see above for details and additional findings  The study was marked in EPIC for significant notification  The study was marked in Epic for follow-up  Workstation performed: [id number]     XR chest pa & lateral    Result Date: 6/15/2023  Narrative: CHEST INDICATION:   R59 0: Localized enlarged lymph nodes  COMPARISON: CXR 12/11/2018 and neck CT 3/30/2023  EXAM PERFORMED/VIEWS:  XR CHEST PA & LATERAL  FINDINGS: Cardiomediastinal silhouette normal  Lungs clear  No effusion or pneumothorax  Upper abdomen normal  Bones normal for age  Impression: No acute cardiopulmonary disease  Workstation performed: [id number]       Neal Crawley PA-C    **Please note:  Dictation voice to text software may have been used in the creation of this record    Occasional wrong word or “sound alike” substitutions may have occurred due to the inherent limitations of voice recognition software  Read the chart carefully and recognize, using context, where substitutions have occurred  **

## 2023-06-30 NOTE — TELEPHONE ENCOUNTER
I called Maira in response to a referral that was received for patient to establish care with Hematology  Outreach was made to schedule a consultation       I left a voicemail explaining the reason for my call and advised patient to call \[facility]\"" at [phone]  Another attempt will be made to contact patient

## 2023-06-30 NOTE — PATIENT INSTRUCTIONS
Please start on the omeprazole every day for the stomach  We will try to get your colonoscopy scheduled for you as soon as possible somewhere in [facility], possibly at [facility]

## 2023-06-30 NOTE — PROGRESS NOTES
Chart rvw'd on 6/30/2023  Colonoscopy date-  N/A last one done was in 2019  Impression-  N/A    Pathology-  6/27/2023-  A  Liver, Biopsy:  - Metastatic adenocarcinoma, suggestive of colorectal primary   - Background hepatic steatosis    Imaging-  6/19/2023- PET CT  1  Multifocal hypermetabolic malignancy/metastatic disease involving the neck, abdomen, and likely chest      Specifically in the neck, there is hypermetabolic cervical lymphadenopathy as well as hypermetabolic asymmetric soft tissue prominence to the right tonsillar region for which correlation with direct visualization and possibly tissue sampling is   recommended to exclude the possibility of underlying right tonsillar malignancy  Additional nonspecific hypermetabolic soft tissue is noted in the left tonsillar region extending to the base of the tongue/vallecular regions  Patient's known right thyroid   lobe nodule is non-FDG avid and remains of uncertain clinical significance      In the chest, there is a 0 8 cm minimally to mildly FDG avid right lower lobe nodule suspicious for malignancy/metastatic disease      In the abdomen, there is multifocal hypermetabolic hepatic malignancy/metastatic disease  Additionally, there is focal hypermetabolic concentric mural thickening of the proximal to mid ascending colon suspicious for colonic malignancy which can be   further characterized with direct visualization/colonoscopy      2  Indeterminant 7 8 cm minimally to mildly FDG avid lobulated right pelvic mass in the right adnexal region which is poorly characterized on low-dose unenhanced CT and could reflect exophytic uterine fibroid or right adnexal/ovarian mass  Further   evaluation with pelvic ultrasound (MRI if needed) is recommended for further characterization as clinically indicated        Scheduled appointments-  7/5/2023- Dr Luis Means (ENT)  7/6/2023- MRI pelvis rectal cancer staging  7/13/2023- Dr Esther Hutchinson (ENT)  8/24/2023- Dr Altaf Gibson    Pending scheduling patient with medical oncology    Surgical date-  N/A    Post surgical pathology-  N/A

## 2023-06-30 NOTE — H&P (VIEW-ONLY)
[facility] - Outpatient Consultation  Magi Calzada 80 y.o. female MRN: [id number]  Encounter: [id number]    ASSESSMENT AND PLAN:      1. Abnormal PET scan of colon  2. Family history of colon cancer    Patient had underwent expedited evaluation for cervical lymphadenopathy with lymph node biopsy suggestive of carcinoma though non-specific. Recent PET CT noting numerous hypermetabolic lesions in the chest abdomen and pelvis, notably in the liver and ascending colon, with an area of concentric mural thickening concerning for malignancy. She has a first-degree relative with colon cancer and no prior colonoscopy on file. Recent liver lesion biopsy confirming adenocarcinoma suggestive of colorectal source. She has no obstructive symptoms to the GI tract at this time. We reviewed with patient the indication for colonoscopy at this time to evaluate for any intraluminal pathology including that noted on the PET/CT. We reviewed that should be an expedited evaluation and we have sent in the GoLytely bowel preparation for her. Risks associated with endoscopic evaluation discussed with patient today, including but not limited to bleeding, infection, perforation, and organ injury, all of which are low. Pt demonstrates understanding and is agreeable to the plan. I have sent a note to our \[facility] [place] [facility]\"" team to help schedule a colonoscopy for patient within the next 1 to 2 weeks close to her home. Additional recommendations pending colonoscopy results and recommendation of surgical oncologist Dr. Cesar Iqbal. We also discussed with patient today the importance of colon cancer screening in any of her first-degree relatives. - Ambulatory referral to Gastroenterology  - polyethylene glycol (GOLYTELY) 4000 mL solution; Take 4,000 mL by mouth once for 1 dose  Dispense: 4000 mL; Refill: 0  - bisacodyl (DULCOLAX) 5 mg EC tablet;  Take as directed according to bowel prep instructions  Dispense: 2 tablet; Refill: 0  - Colonoscopy; Future  - omeprazole (PriLOSEC) 20 mg delayed release capsule; Take 1 capsule (20 mg total) by mouth daily  Dispense: 30 capsule; Refill: 5    3. Heartburn     During review of systems, patient does note history of heartburn and indigestion for which she takes over-the-counter antacids. We did discuss we can send in a prescription for her to take daily for symptom control. No alarm features to the upper GI tract at this time. Continue with diet and lifestyle modifications for GERD. We will follow-up with patient after colonoscopy is completed. Follow-up should be scheduled in the [facility]. ______________________________________________________________________    HPI: Patient is a 80 y.o. female who presents today for a consultation regarding abnormal findings on PET/CT. Past medical history significant for breast cancer, family history of colon cancer, prediabetes, HLD, BMI 37. Patient is new to clinic. She was referred by surgical oncology. She initially presented to the surgical oncology office in 06/2023 for evaluation of new right neck nodules, which pt initially noted in 02/2023. She underwent an FNA of the enlarged right cervical node which revealed carcinoma, however tissue etiology was not able to be determined. She subsequently underwent a PET/CT which commented on multifocal hypermetabolic malignancy/metastatic disease involving the neck, abdomen, and chest, including liver lesions and and a hypermetabolic concentric mural thickening of the proximal to mid ascending colon suspicious for malignancy. She most recently underwent a liver biopsy with the pathology returning less than 24 hours ago which commented on metastatic adenocarcinoma suggestive of a colorectal primary. Patient is here today with her daughter. She shares that she is moving her bowels just about daily. She denies any constipation or diarrhea.   She denies any change in stool caliber over the past couple of months. She notes that she gets "stomach aches" sometimes in the lower abdomen, which do not seem to be related to oral intake or defecation. She denies any BRBPR or melena. She has lost approximately 10 pounds in the past 6 months. She shares her mother had colon cancer diagnosed in her 62s. She has never had a colonoscopy in the past.    She does have a history of heartburn for which she takes an over the counter antacid with good effect. She denies any breakthrough symptoms of indigestion, nausea, vomiting, dysphagia or odynophagia. She is not on any anticoagulants, no use of supplemental O2, no CKD, no pacemaker/defibrillator, no adverse reaction anesthesia in the past.    06/2023: Hb 11.5, HCT 32, MCV 83, platelets 011, INR 8.63, BUN 13, creatinine 0.88, AST 44, ALT 39, , albumin 4.0, T. bili 0.51  06/2023: PET CT: In the abdomen, there is multifocal hypermetabolic hepatic malignancy/metastatic disease. Additionally, there is focal hypermetabolic concentric mural thickening of the proximal to mid ascending colon suspicious for colonic malignancy which can be   further characterized with direct visualization/colonoscopy  06/2023: A. Liver, Biopsy: Metastatic adenocarcinoma, suggestive of colorectal primary. Background hepatic steatosis  05/2023: FNA:A. & B. Lymph Node, Right Level II A: (Thin-Prep and smears) Suspicious for malignancy. Clusters of atypical epithelioid cells in a background of lymphoid cells, suspicious for carcinoma.      Review of Systems   Otherwise Per HPI    Historical Information   Past Medical History:   Diagnosis Date   • Anemia    • Arthritis    • Breast cancer ([address]) 12/17/2018   • Cancer ([address])     right breast   • GERD (gastroesophageal reflux disease)    • Hyperlipidemia    • Hypertension    • Stroke [facility])     TIA    • Transaminitis 9/22/2021     Past Surgical History:   Procedure Laterality Date   • BREAST BIOPSY Right 11/23/2018     guided bx cancer   • BREAST SURGERY     • IR BIOPSY LIVER MASS  6/27/2023   • AK MASTECTOMY PARTIAL W/AXILLARY LYMPHADENECTOMY Right 12/20/2018    Procedure: ULTRASOUND LOCALIZED PARTIAL MASTECTOMY W/SENTINEL NODE BIOPSY POSS AXILLARY DISSECTION;  Surgeon: Fabiola Agudelo MD;  Location: [address] [place] [address] MAIN OR;  Service: General   • TUBAL LIGATION     • US GUIDED BREAST BIOPSY RIGHT COMPLETE Right 11/23/2018   • US GUIDED INJECTION FOR RESEARCH STUDY  12/20/2018   • US GUIDED INJECTION FOR RESEARCH STUDY  12/7/2018   • US GUIDED INJECTION FOR RESEARCH STUDY  5/3/2019   • US GUIDED LYMPH NODE BIOPSY RIGHT  5/26/2023     Social History   Social History     Substance and Sexual Activity   Alcohol Use No     Social History     Substance and Sexual Activity   Drug Use No     Social History     Tobacco Use   Smoking Status Never   Smokeless Tobacco Never   Tobacco Comments    NO TOBACCO USE     Family History   Problem Relation Age of Onset   • Colon cancer Mother 62   • Hypertension Mother    • Cancer Father    • Pancreatic cancer Father 61   • Hypertension Son    • Hypertension Daughter        Meds/Allergies       Current Outpatient Medications:   •  anastrozole (ARIMIDEX) 1 mg tablet  •  atorvastatin (LIPITOR) 40 mg tablet  •  losartan (COZAAR) 50 mg tablet  •  b complex vitamins tablet  •  ergocalciferol (VITAMIN D2) 89,204 units  •  folic acid (FOLVITE) 1 mg tablet  •  traMADol (Ultram) 50 mg tablet  •  vitamin B-12 (VITAMIN B-12) 1,000 mcg tablet    No Known Allergies        Objective     Blood pressure 135/88, pulse 85, temperature 97.8 °F (36.6 °C), temperature source Tympanic, resp. rate 16, height 5' 6" (1.676 m), weight 120 kg (264 lb 3.2 oz), SpO2 98 %, not currently breastfeeding. Body mass index is 42.64 kg/m². Physical Exam  Vitals and nursing note reviewed. Constitutional:       Appearance: She is well-developed. She is obese. HENT:      Head: Normocephalic and atraumatic.    Eyes:      General: No scleral icterus. Conjunctiva/sclera: Conjunctivae normal.   Cardiovascular:      Rate and Rhythm: Normal rate. Pulmonary:      Effort: Pulmonary effort is normal. No respiratory distress. Abdominal:      General: Bowel sounds are normal. There is no distension. Tenderness: There is no abdominal tenderness. There is no guarding or rebound. Lymphadenopathy:      Cervical: Cervical adenopathy present. Skin:     General: Skin is warm and dry. Coloration: Skin is not jaundiced. Neurological:      General: No focal deficit present. Mental Status: She is alert and oriented to person, place, and time. Psychiatric:         Mood and Affect: Mood normal.         Behavior: Behavior normal.        Lab Results:   No visits with results within 1 Day(s) from this visit.    Latest known visit with results is:   Hospital Outpatient Visit on 06/27/2023   Component Date Value   • Protime 06/27/2023 14.7 (H)    • INR 06/27/2023 1.08    • WBC 06/27/2023 9.45    • RBC 06/27/2023 3.86    • Hemoglobin 06/27/2023 10.3 (L)    • Hematocrit 06/27/2023 32.0 (L)    • MCV 06/27/2023 83    • MCH 06/27/2023 26.7 (L)    • MCHC 06/27/2023 32.2    • RDW 06/27/2023 14.1    • Platelets 93/34/4260 319    • MPV 06/27/2023 9.9    • Case Report 06/27/2023                      Value:Surgical Pathology Report                         Case: [id number]                                   Authorizing Provider:  Elvin Ugarte MD       Collected:           06/27/2023 1143              Ordering Location:     [facility]     Received:            06/27/2023 [address] [place] [address]                                                                               Radiology                                                                    Pathologist:           Delmer Cid MD                                                                  Specimen:    Liver • Final Diagnosis 06/27/2023                      Value: This result contains rich text formatting which cannot be displayed here. • Microscopic Description 06/27/2023                      Value: This result contains rich text formatting which cannot be displayed here. • Note 06/27/2023                      Value: This result contains rich text formatting which cannot be displayed here. • Additional Information 06/27/2023                      Value: This result contains rich text formatting which cannot be displayed here. • Intraoperative Consultat* 06/27/2023                      Value: This result contains rich text formatting which cannot be displayed here. • Gross Description 06/27/2023                      Value: This result contains rich text formatting which cannot be displayed here. • Clinical Information 06/27/2023                      Value:hypermetabolic liver mass, colon mass, history breast CA    PET/CT (6/19/2023)  1. Multifocal hypermetabolic malignancy/metastatic disease involving the neck, abdomen, and likely chest.     Specifically in the neck, there is hypermetabolic cervical lymphadenopathy as well as hypermetabolic asymmetric soft tissue prominence to the right tonsillar region for which correlation with direct visualization and possibly tissue sampling is recommended to exclude the possibility of underlying right tonsillar malignancy. Additional nonspecific hypermetabolic soft tissue is noted in the left tonsillar region extending to the base of the tongue/vallecular regions. Patient's known right thyroid lobe nodule is non-FDG avid and remains of uncertain clinical significance.     In the chest, there is a 0.8 cm minimally to mildly FDG avid right lower lobe nodule suspicious for malignancy/metastatic disease.     In the abdomen, there is multifocal hypermetabolic hepatic malignancy/metastatic disease.                            Additionally, there is focal hypermetabolic concentric mural thickening of the proximal to mid ascending colon suspicious for colonic malignancy which can be further characterized with direct visualization/colonoscopy.     2. Indeterminant 7.8 cm minimally to mildly FDG avid lobulated right pelvic mass in the right adnexal region which is poorly characterized on low-dose unenhanced CT and could reflect exophytic uterine fibroid or right adnexal/ovarian mass. Further evaluation with pelvic ultrasound (MRI if needed) is recommended for further characterization as clinically indicated. Radiology Results:   IR biopsy liver mass    Result Date: 6/27/2023  Narrative: Ultrasound-guided  biopsy of the liver lesions Clinical History: Patient with history of breast cancer. Now with PET positive liver lesions for biopsy. . Technique: The patient was brought to the ultrasound area and placed supine on the stretcher. After a brief ultrasound examination was performed of the liver, and correlated with prior imaging, a site on the upper abdominal wall laterally was prepped and  draped in usual sterile fashion. Lidocaine was administered to the skin and a 17 G coaxial needle was advanced into the right hepatic lobe lesion under direct ultrasound guidance. Three 18-gauge specimens were obtained. The specimen was found to be adequate. A full details A pathology report will follow. D-Stat slurry placed through the coaxial needle. The patient tolerated the procedure well and suffered no complications. Impression: Impression: Successful fine needle aspiration biopsy of right hepatic lobe lesion yielding an adequate specimen . A full report will follow. Workstation performed: [id number]     NM PET CT skull base to mid thigh    Result Date: 6/20/2023  Narrative: WHOLE-BODY PET/CT SCAN INDICATION: C73:  Malignant neoplasm of thyroid gland The following clinical information was obtained directly from EPIC: h/o newly dx carcinoma of the Rt neck, unknown primary site. Recent CT showed heterogenous Rt thyroid lobe nodule (possible primary) and a mild asymmetric prominence of Rt palantine tonsil. Initial staging. h/o Rt breast ca. s/p Rt partial mastectomy/SLN bx 12/20/18 (+ infiltrating ductal carcinoma, ER/DE +/HER2-). Received RT to Rt breast 4/2019; pt continues on daily Anastrozole MODIFIER: PI COMPARISON: CT of neck dated 3/30/2023, thyroid ultrasound dated 4/27/2023, CELL TYPE:  suspicious for malignancy; clusters of atypical epithelioid cells (bx 5/26/23 Rt neck LN; level 2 +) TECHNIQUE: Following the intravenous administration of 12.9 mCi F-18-FDG, dedicated head and neck images were obtained from the skull vertex to the thoracic inlet with the patient's arms at their side 60 minutes post injection. Subsequently, whole body images were obtained from the thoracic inlet through the proximal femurs with the patients arms above their head. Multiplanar attenuation corrected and non attenuation corrected PET images are available for interpretation. Contiguous, low dose, axial CT sections were also obtained from the head through the thoracic inlet and from the thoracic inlet through the proximal femurs. Intravenous contrast material was not utilized. Additional coronal and sagittal images are available as well as fused PET/CT images. This examination, like all CT scans performed in the [facility], was performed utilizing techniques to minimize radiation dose exposure, including the use of iterative reconstruction and automated exposure control. Fasting serum glucose: 106 mg/dl FINDINGS: VISUALIZED BRAIN: No acute abnormalities are seen.  HEAD/NECK: On image 38 of series 17 there is asymmetric nodular FDG activity involving the right tonsil region where there appears to be slight asymmetric nodular soft tissue measuring 1.5 cm with max SUV of 32; given the asymmetric nodular appearance of the right tonsil region, further evaluation with direct visualization and possibly tissue sampling is recommended to exclude tonsillar carcinoma. There is additional prominent tracer activity involving the left tonsillar region and symmetric hypermetabolic soft tissue involving the base of the tongue extending to the vallecular regions of uncertain clinical significance and possibly inflammatory in etiology with underlying hypermetabolic malignancy in these locations not excluded. These regions can also be further characterized with direct visualization as clinically indicated. There is right-sided hypermetabolic cervical lymphadenopathy most consistent with cervical claudio metastatic disease with primary malignancy/lymphoproliferative malignancy not excluded. Representative hypermetabolic lymph node is noted in the right level 2 region on image 42 of series 17 measuring approximately 3.3 x 2.4 cm with max SUV of 22. Additional smaller hypermetabolic right cervical lymph nodes are noted extending posteriorly and inferiorly from this dominant right level 2 cervical lymph node subjacent to the right sternocleidomastoid muscle. There are mildly prominent, borderline enlarged mildly FDG avid left cervical lymph nodes of uncertain clinical significance; for example, on image 44 of series 17 there is a left level 2 cervical lymph node measuring 0.9 cm in short axis with max SUV of  4.1; while findings could reflect benign inflammatory/reactive lymph nodes, findings are suspicious for developing left-sided cervical claudio metastatic disease. Patient's known right-sided heterogeneous thyroid nodule is seen on image 61 of series 17 measuring approximately 2.1 cm without focal FDG activity. This thyroid nodule remains of uncertain clinical significance CT images: Intracranial atherosclerotic calcification is noted.  CHEST: On image 55 of series 3 there is a 0.8 cm right lower lobe thyroid nodule with slight focal FDG activity with max SUV of 2.2 suspicious but not definitive for hypermetabolic malignancy/metastatic disease. CT images: Atherosclerotic vascular calcifications including those of the coronary arteries are noted. ABDOMEN: There are multiple (almost 10) hypermetabolic hepatic masses most consistent with hypermetabolic metastatic disease which are poorly characterized on low-dose unenhanced CT. Representative hypermetabolic hepatic mass measures approximate 4.4 cm on image 89 of series 3 involving the left hepatic lobe with max SUV of 17. Best seen on image 141 of series 3 is a short segment of intense focal FDG activity involving the proximal to mid ascending colon where there is mild concentric mural thickening and mild infiltration of the adjacent mesenteric fat with max SUV of 20.0 highly suspicious for hypermetabolic colonic malignancy with differential diagnosis including less likely but not excluded focal colitis. CT images: Cholelithiasis. Atherosclerotic vascular calcifications are noted. Small hiatal hernia. PELVIS: Best seen on image 173 of series 3 is a lobulated large right pelvic mass in the right adnexal region measuring 7.8 x 5.4 cm which exhibits minimal to mild patchy FDG activity and is of uncertain clinical significance with differential diagnosis including exophytic fibroid versus right adnexal/ovarian mass. This finding can be further characterized with pelvic ultrasound (MRI if needed). CT images: Unremarkable. OSSEOUS STRUCTURES: No FDG avid lesions are  seen. CT images: Degenerative changes are noted involving the spine. Impression: 1.  Multifocal hypermetabolic malignancy/metastatic disease involving the neck, abdomen, and likely chest. Specifically in the neck, there is hypermetabolic cervical lymphadenopathy as well as hypermetabolic asymmetric soft tissue prominence to the right tonsillar region for which correlation with direct visualization and possibly tissue sampling is recommended to exclude the possibility of underlying right tonsillar malignancy. Additional nonspecific hypermetabolic soft tissue is noted in the left tonsillar region extending to the base of the tongue/vallecular regions. Patient's known right thyroid  lobe nodule is non-FDG avid and remains of uncertain clinical significance. In the chest, there is a 0.8 cm minimally to mildly FDG avid right lower lobe nodule suspicious for malignancy/metastatic disease. In the abdomen, there is multifocal hypermetabolic hepatic malignancy/metastatic disease. Additionally, there is focal hypermetabolic concentric mural thickening of the proximal to mid ascending colon suspicious for colonic malignancy which can be further characterized with direct visualization/colonoscopy. 2. Indeterminant 7.8 cm minimally to mildly FDG avid lobulated right pelvic mass in the right adnexal region which is poorly characterized on low-dose unenhanced CT and could reflect exophytic uterine fibroid or right adnexal/ovarian mass. Further evaluation with pelvic ultrasound (MRI if needed) is recommended for further characterization as clinically indicated. Please see above for details and additional findings. The study was marked in EPIC for significant notification. The study was marked in Epic for follow-up. Workstation performed: [id number]     XR chest pa & lateral    Result Date: 6/15/2023  Narrative: CHEST INDICATION:   R59.0: Localized enlarged lymph nodes. COMPARISON: CXR 12/11/2018 and neck CT 3/30/2023. EXAM PERFORMED/VIEWS:  XR CHEST PA & LATERAL. FINDINGS: Cardiomediastinal silhouette normal. Lungs clear. No effusion or pneumothorax. Upper abdomen normal. Bones normal for age. Impression: No acute cardiopulmonary disease. Workstation performed: [id number]       Destini Saul PA-C    **Please note:  Dictation voice to text software may have been used in the creation of this record.   Occasional wrong word or “sound alike” substitutions may have occurred due to the inherent limitations of voice recognition software. Read the chart carefully and recognize, using context, where substitutions have occurred. **

## 2023-07-03 ENCOUNTER — TELEPHONE (OUTPATIENT)
Dept: HEMATOLOGY ONCOLOGY | Facility: CLINIC | Age: 68
End: 2023-07-03

## 2023-07-03 NOTE — TELEPHONE ENCOUNTER
I called Maira in response to a referral that was received for patient to establish care with Medical Oncology. Outreach was made to schedule a consultation. .    I left a voicemail explaining the reason for my call and advised patient to call [facility] at [phone]. Another attempt will be made to contact patient.

## 2023-07-05 ENCOUNTER — PATIENT OUTREACH (OUTPATIENT)
Dept: HEMATOLOGY ONCOLOGY | Facility: CLINIC | Age: 68
End: 2023-07-05

## 2023-07-05 ENCOUNTER — TELEPHONE (OUTPATIENT)
Dept: HEMATOLOGY ONCOLOGY | Facility: CLINIC | Age: 68
End: 2023-07-05

## 2023-07-05 NOTE — PROGRESS NOTES
Left a message with pts son Kosta Munoz to return my call to set up a consult appointment for [address] [place] [address].  Left my contact information, as well as the oncology hope line contact information

## 2023-07-05 NOTE — H&P (VIEW-ONLY)
Consultation - Otolaryngology - Head and Neck Surgery  Facial Plastic and Reconstructive Surgery  Benny Read 80 y.o. female MRN: [id number]  Encounter: [id number]        Assessment/Plan:  Right pharyngeal mass  We discussed risks, benefits, and alternatives to the Microdirect laryngoscopy, flexible esophagoscopy, and bronchoscopy. We discussed of injury to teeth, tongue, gums, airway injury and need for urgent airway procedures, failure and diagnosis, bleeding, infection, hoarseness, and risks of dysphagia. We discussed the risks of the esophageal perforation for both rigid and flexible procedures. History of Present Illness   Physician Requesting Consult: Yasmine Rivera MD   Reason for Consult / Principal Problem: enlarged lymph node   HPI: Benny Read is a 80y.o. year old female who presents with . Patient initially presented to PCP on 3/21/23 with sore throat and enlarged lymph node. US was performed and subsequently a FNA which was suggestive of a non-specific carcinoma. US showed level 2A lymph node measuring 4.7 x 3.3 x 3.2 cm. She was following with surgical oncology who ordered PET CT which demonstrated numerous hypermetabolic lesions of chest, abdomen, and pelvis notably in the liver and ascending colon. Liver biopsy resulted metastatic adenocarcinoma likely from colorectal origin. She has a colonoscopy scheduled for tomorrow. Previous Stage IIA ductal carcinoma of the right breast. Had partial mastectomy and chemoradiation treatment completed in 2019. Review of systems:  10 Point ROS was performed and negative except as above or otherwise noted in the medical record.     Historical Information   Past Medical History:   Diagnosis Date   • Anemia    • Arthritis    • Breast cancer ([address]) 12/17/2018   • Cancer ([address])     right breast   • GERD (gastroesophageal reflux disease)    • Hyperlipidemia    • Hypertension    • Stroke [facility])     TIA    • Transaminitis 9/22/2021 Past Surgical History:   Procedure Laterality Date   • BREAST BIOPSY Right 11/23/2018    us guided bx cancer   • BREAST SURGERY     • IR BIOPSY LIVER MASS  6/27/2023   • IA MASTECTOMY PARTIAL W/AXILLARY LYMPHADENECTOMY Right 12/20/2018    Procedure: ULTRASOUND LOCALIZED PARTIAL MASTECTOMY W/SENTINEL NODE BIOPSY POSS AXILLARY DISSECTION;  Surgeon: Irineo Be MD;  Location: [address] [place] [address] MAIN OR;  Service: General   • TUBAL LIGATION     • US GUIDED BREAST BIOPSY RIGHT COMPLETE Right 11/23/2018   • US GUIDED INJECTION FOR RESEARCH STUDY  12/20/2018   • US GUIDED INJECTION FOR RESEARCH STUDY  12/7/2018   • US GUIDED INJECTION FOR RESEARCH STUDY  5/3/2019   • US GUIDED LYMPH NODE BIOPSY RIGHT  5/26/2023     Social History   Social History     Substance and Sexual Activity   Alcohol Use No     Social History     Substance and Sexual Activity   Drug Use No     Social History     Tobacco Use   Smoking Status Never   Smokeless Tobacco Never   Tobacco Comments    NO TOBACCO USE     Family History:   Family History   Problem Relation Age of Onset   • Colon cancer Mother 62   • Hypertension Mother    • Cancer Father    • Pancreatic cancer Father 61   • Hypertension Son    • Hypertension Daughter        Current Outpatient Medications on File Prior to Visit   Medication Sig   • anastrozole (ARIMIDEX) 1 mg tablet Take 1 tablet (1 mg total) by mouth daily   • atorvastatin (LIPITOR) 40 mg tablet Take 1 tablet (40 mg total) by mouth daily at bedtime   • b complex vitamins tablet Take 1 tablet by mouth daily (Patient not taking: Reported on 6/30/2023)   • bisacodyl (DULCOLAX) 5 mg EC tablet Take as directed according to bowel prep instructions   • ergocalciferol (VITAMIN D2) 50,000 units Take 1 capsule (50,000 Units total) by mouth once a week   • folic acid (FOLVITE) 1 mg tablet Take 1 tablet (1,000 mcg total) by mouth daily (Patient not taking: Reported on 6/30/2023)   • losartan (COZAAR) 50 mg tablet Take 1 tablet (50 mg total) by mouth daily   • omeprazole (PriLOSEC) 20 mg delayed release capsule Take 1 capsule (20 mg total) by mouth daily   • polyethylene glycol (GOLYTELY) 4000 mL solution Take 4,000 mL by mouth once for 1 dose   • traMADol (Ultram) 50 mg tablet Take 1 tablet (50 mg total) by mouth every 6 (six) hours as needed for moderate pain (Patient not taking: Reported on 6/30/2023)   • vitamin B-12 (VITAMIN B-12) 1,000 mcg tablet Take 1 tablet (1,000 mcg total) by mouth daily (Patient not taking: Reported on 6/30/2023)     No current facility-administered medications on file prior to visit. No Known Allergies    There were no vitals filed for this visit. Physical Exam   Constitutional: Oriented to person, place, and time. Well-developed and well-nourished, no apparent distress, non-toxic appearance. Cooperative, able to hear and answer questions without difficulty. Voice: Normal voice quality. Head: Normocephalic, atraumatic. No scars, masses or lesions. Face: Symmetric, no edema, no sinus tenderness. Eyes: Vision grossly intact, extra-ocular movement intact. Ears: External ears normal. Tympanic membranes intact with intact normal landmarks. No post-auricular erythema or tenderness. Nose: Septum intact, nares clear. Mucosa moist, turbinates well appearing. No crusting, polyps or discharge evident. Oral cavity: Dentition intact. Mucosa moist, lips without lesions or masses. Tongue mobile, floor of mouth soft and flat. Hard palate intact. No masses or lesions. Oropharynx: Uvula is midline, soft palate intact without lesion or mass. Irregularity at the base of the right tonsil, firm on palpation  Salivary glands:  Parotid glands and submandibular glands symmetric, no enlargement or tenderness. Neck: Normal laryngeal elevation with swallow. Trachea midline. No masses or lesions. Right level 2A lymph node approx 6 cm x 4 cm   Thyroid: Without tenderness or palpable nodules.   Pulmonary/Chest: Normal effort and rate. No respiratory distress. No stertor or stridor  Musculoskeletal: Normal range of motion. Neurological: Cranial nerves 2-12 intact. Skin: Skin is warm and dry. Psychiatric: Normal mood and affect. Procedure: Flexible fiberoptic laryngoscopy    Indications: Evaluate areas of the upper aerodigestive tract not seen on physical exam    Procedure in detail: After informed verbal consent was obtained the nose was anesthetized using 4% lidocaine and neosynephrine spray. After adequate time for anesthesia the scope was guided easily along the nasal cavity floor and into the nasopharynx. The nasopharynx, oropharynx, hypopharynx and larynx were evaluated with the below listed findings. The scope was removed without difficulty and the patient tolerated the procedure well. Findings:    No significant mucoid purulence or polyposis in the nasal cavity. Right pharyngeal mass. No lesions or masses of the nasopharynx, hypopharynx, or larynx. Airway is widely patent. Bilateral vocal folds are fully mobile. No lesions or masses of the subglottis. Imaging Studies: I have personally reviewed pertinent reports. and I have personally reviewed pertinent films in PACS    Lab Results: I have personally reviewed pertinent lab results. Records reviewed: Surg Onc notes, Family medicine note 3/21, CT soft tissue neck PET CT, GI consult 6/30/23, Lymph node biopsy 5/26, Liver biopsy, thyroid US.

## 2023-07-05 NOTE — TELEPHONE ENCOUNTER
I called Maira in response to a referral that was received for patient to establish care with Medical Oncology. Outreach was made to schedule a consultation. .    I left a voicemail explaining the reason for my call and advised patient to call hospitals at [phone]. This is the third attempt to schedule patient unsuccessfully. The referral has been closed, a [employer] message has been sent to patient (if applicable) and a letter has been sent to the address on file.

## 2023-07-06 ENCOUNTER — TELEPHONE (OUTPATIENT)
Dept: FAMILY MEDICINE CLINIC | Facility: CLINIC | Age: 68
End: 2023-07-06

## 2023-07-06 ENCOUNTER — ANESTHESIA (OUTPATIENT)
Dept: GASTROENTEROLOGY | Facility: HOSPITAL | Age: 68
End: 2023-07-06

## 2023-07-06 ENCOUNTER — HOSPITAL ENCOUNTER (OUTPATIENT)
Dept: RADIOLOGY | Facility: HOSPITAL | Age: 68
Discharge: HOME | End: 2023-07-06
Attending: STUDENT IN AN ORGANIZED HEALTH CARE EDUCATION/TRAINING PROGRAM
Payer: MEDICARE

## 2023-07-06 ENCOUNTER — HOSPITAL ENCOUNTER (OUTPATIENT)
Dept: GASTROENTEROLOGY | Facility: HOSPITAL | Age: 68
Setting detail: OUTPATIENT SURGERY
End: 2023-07-06
Attending: INTERNAL MEDICINE
Payer: MEDICARE

## 2023-07-06 ENCOUNTER — ANESTHESIA EVENT (OUTPATIENT)
Dept: GASTROENTEROLOGY | Facility: HOSPITAL | Age: 68
End: 2023-07-06

## 2023-07-06 VITALS
SYSTOLIC BLOOD PRESSURE: 120 MMHG | BODY MASS INDEX: 42.43 KG/M2 | OXYGEN SATURATION: 98 % | DIASTOLIC BLOOD PRESSURE: 60 MMHG | HEART RATE: 78 BPM | HEIGHT: 66 IN | RESPIRATION RATE: 18 BRPM | TEMPERATURE: 97.7 F | WEIGHT: 264 LBS

## 2023-07-06 DIAGNOSIS — R16.0 LIVER MASS: ICD-10-CM

## 2023-07-06 DIAGNOSIS — N94.89 ADNEXAL MASS: ICD-10-CM

## 2023-07-06 DIAGNOSIS — R94.8 ABNORMAL PET SCAN OF COLON: ICD-10-CM

## 2023-07-06 PROCEDURE — 74183 MRI ABD W/O CNTR FLWD CNTR: CPT

## 2023-07-06 PROCEDURE — 88305 TISSUE EXAM BY PATHOLOGIST: CPT | Performed by: SPECIALIST

## 2023-07-06 PROCEDURE — 88341 IMHCHEM/IMCYTCHM EA ADD ANTB: CPT | Performed by: SPECIALIST

## 2023-07-06 PROCEDURE — 45385 COLONOSCOPY W/LESION REMOVAL: CPT | Performed by: INTERNAL MEDICINE

## 2023-07-06 PROCEDURE — 45381 COLONOSCOPY SUBMUCOUS NJX: CPT | Performed by: INTERNAL MEDICINE

## 2023-07-06 PROCEDURE — A9585 GADOBUTROL INJECTION: HCPCS | Performed by: STUDENT IN AN ORGANIZED HEALTH CARE EDUCATION/TRAINING PROGRAM

## 2023-07-06 PROCEDURE — 88342 IMHCHEM/IMCYTCHM 1ST ANTB: CPT | Performed by: SPECIALIST

## 2023-07-06 PROCEDURE — G1004 CDSM NDSC: HCPCS

## 2023-07-06 PROCEDURE — 72197 MRI PELVIS W/O & W/DYE: CPT

## 2023-07-06 PROCEDURE — 45380 COLONOSCOPY AND BIOPSY: CPT | Performed by: INTERNAL MEDICINE

## 2023-07-06 RX ORDER — PROPOFOL 10 MG/ML
INJECTION, EMULSION INTRAVENOUS CONTINUOUS PRN
Status: DISCONTINUED | OUTPATIENT
Start: 2023-07-06 | End: 2023-07-06

## 2023-07-06 RX ORDER — SODIUM CHLORIDE 9 MG/ML
INJECTION, SOLUTION INTRAVENOUS CONTINUOUS PRN
Status: DISCONTINUED | OUTPATIENT
Start: 2023-07-06 | End: 2023-07-06

## 2023-07-06 RX ORDER — PROPOFOL 10 MG/ML
INJECTION, EMULSION INTRAVENOUS AS NEEDED
Status: DISCONTINUED | OUTPATIENT
Start: 2023-07-06 | End: 2023-07-06

## 2023-07-06 RX ADMIN — PROPOFOL 140 MCG/KG/MIN: 10 INJECTION, EMULSION INTRAVENOUS at 11:06

## 2023-07-06 RX ADMIN — PROPOFOL 50 MG: 10 INJECTION, EMULSION INTRAVENOUS at 11:26

## 2023-07-06 RX ADMIN — SODIUM CHLORIDE: 0.9 INJECTION, SOLUTION INTRAVENOUS at 11:00

## 2023-07-06 RX ADMIN — GADOBUTROL 12 ML: 604.72 INJECTION INTRAVENOUS at 20:39

## 2023-07-06 RX ADMIN — PROPOFOL 130 MG: 10 INJECTION, EMULSION INTRAVENOUS at 11:03

## 2023-07-06 NOTE — TELEPHONE ENCOUNTER
Telephone call to patient to discuss colonoscopy findings and recommended follow up. Unable to reach. Left message requesting callback.  Will see Maira in the office at the next appointent

## 2023-07-06 NOTE — INTERVAL H&P NOTE
H&P reviewed. After examining the patient I find no changes in the patients condition since the H&P had been written.     Vitals:    07/06/23 1046   BP: 164/79   Pulse: 90   Resp: 20   Temp: 98.3 °F (36.8 °C)   SpO2: 99%

## 2023-07-06 NOTE — ANESTHESIA PREPROCEDURE EVALUATION
Procedure:  COLONOSCOPY    Relevant Problems   ANESTHESIA (within normal limits)      CARDIO   (+) Mixed hyperlipidemia   (+) Primary hypertension      GI/HEPATIC   (+) GERD (gastroesophageal reflux disease)      GYN   (+) Malignant neoplasm of right female breast (HCC)      NEURO/PSYCH   (+) Stroke (HCC)      Other   (+) Cervical lymphadenopathy   (+) Obesity        TTE 1/4/2022  •  Left Ventricle: Left ventricle is normal in size. Systolic function is   normal with an ejection fraction of 60-65%. Wall motion is within normal   limits, poor endocardial definition. There is grade I (mild) diastolic   dysfunction. •  Right Ventricle: Right ventricle was not well visualized. Systolic   function is normal.   •  Pericardium: There is a trivial pericardial effusion. No hemodynamically significant valvular heart disease. Technically difficult study. Physical Exam    Airway    Mallampati score: III  TM Distance: >3 FB  Neck ROM: full     Dental       Cardiovascular      Pulmonary      Other Findings        Anesthesia Plan  ASA Score- 3     Anesthesia Type- IV sedation with anesthesia with ASA Monitors. Additional Monitors:   Airway Plan:           Plan Factors-Exercise tolerance (METS): >4 METS. Chart reviewed. EKG reviewed. Existing labs reviewed. Patient summary reviewed. Patient is not a current smoker. Induction- intravenous. Postoperative Plan-     Informed Consent- Anesthetic plan and risks discussed with patient. I personally reviewed this patient with the CRNA. Discussed and agreed on the Anesthesia Plan with the CRNA. García Bone

## 2023-07-06 NOTE — ANESTHESIA POSTPROCEDURE EVALUATION
Post-Op Assessment Note    CV Status:  Stable    Pain management: adequate     Mental Status:  Arousable and sleepy   Hydration Status:  Euvolemic   PONV Controlled:  Controlled   Airway Patency:  Patent      Post Op Vitals Reviewed: Yes      Staff: CRNA         No notable events documented.     /58 (07/06/23 1136)    Temp 97.7 °F (36.5 °C) (07/06/23 1136)    Pulse 79 (07/06/23 1136)   Resp 16 (07/06/23 1136)    SpO2 98 % (07/06/23 1136)

## 2023-07-07 ENCOUNTER — PATIENT OUTREACH (OUTPATIENT)
Dept: HEMATOLOGY ONCOLOGY | Facility: CLINIC | Age: 68
End: 2023-07-07

## 2023-07-07 ENCOUNTER — DOCUMENTATION (OUTPATIENT)
Dept: HEMATOLOGY ONCOLOGY | Facility: CLINIC | Age: 68
End: 2023-07-07

## 2023-07-07 ENCOUNTER — TELEPHONE (OUTPATIENT)
Dept: HEMATOLOGY ONCOLOGY | Facility: CLINIC | Age: 68
End: 2023-07-07

## 2023-07-07 PROCEDURE — 88305 TISSUE EXAM BY PATHOLOGIST: CPT | Performed by: SPECIALIST

## 2023-07-07 NOTE — TELEPHONE ENCOUNTER
Appointment Schedule   Who are you speaking with? Patient   If it is not the patient, are they listed on an active communication consent form? N/A   Which provider is the appointment scheduled with? Dr. Cesar Dudley   At which location is the appointment scheduled for? [place]   When is the appointment scheduled? Please list date and time 7/11 940am   What is the reason for this appointment? Consult    Did patient voice understanding of the details of this appointment? Yes   Was the no show policy reviewed with patient? Yes   Patient would like establish transportation with [facility] for appointments.   [phone]

## 2023-07-07 NOTE — PROGRESS NOTES
Franklyn Monroe, requested assistance in [facility] paperwork forms. Assisted in filling out and scanned into patient chart encounter. Notified Zen Mujica and [facility] paperwork sent.

## 2023-07-10 ENCOUNTER — PATIENT OUTREACH (OUTPATIENT)
Dept: HEMATOLOGY ONCOLOGY | Facility: CLINIC | Age: 68
End: 2023-07-10

## 2023-07-10 ENCOUNTER — RA CDI HCC (OUTPATIENT)
Dept: OTHER | Facility: HOSPITAL | Age: 68
End: 2023-07-10

## 2023-07-10 ENCOUNTER — DOCUMENTATION (OUTPATIENT)
Dept: HEMATOLOGY ONCOLOGY | Facility: CLINIC | Age: 68
End: 2023-07-10

## 2023-07-10 NOTE — PROGRESS NOTES
Called pt to confirm [facility]  Is set up for her consult appt with Dr Parker Dec 7/11/2023,  Instructed pt to be ready 1.5 hours prior to her appt time. She understood, and was thankful for the call     Pt called asking if her daughter would be able to be with her for the  [address]  Transportation to her appointment tomorrow. Message sent to [address] [place] [address] Drive and they stated he has to confirm that there is room on the [facility] for her.   I explained this to the pt and she understood

## 2023-07-10 NOTE — RESULT ENCOUNTER NOTE
I called Ms. Cassidy Zafar with her results. She has an appointment with Dr. Franck Alexander tomorrow.

## 2023-07-11 ENCOUNTER — TELEPHONE (OUTPATIENT)
Dept: GENETICS | Facility: CLINIC | Age: 68
End: 2023-07-11

## 2023-07-11 ENCOUNTER — PATIENT OUTREACH (OUTPATIENT)
Dept: HEMATOLOGY ONCOLOGY | Facility: CLINIC | Age: 68
End: 2023-07-11

## 2023-07-11 ENCOUNTER — TELEPHONE (OUTPATIENT)
Dept: HEMATOLOGY ONCOLOGY | Facility: CLINIC | Age: 68
End: 2023-07-11

## 2023-07-11 ENCOUNTER — CONSULT (OUTPATIENT)
Dept: HEMATOLOGY ONCOLOGY | Facility: CLINIC | Age: 68
End: 2023-07-11
Payer: MEDICARE

## 2023-07-11 VITALS
OXYGEN SATURATION: 98 % | DIASTOLIC BLOOD PRESSURE: 76 MMHG | SYSTOLIC BLOOD PRESSURE: 138 MMHG | HEART RATE: 76 BPM | TEMPERATURE: 97.2 F | HEIGHT: 66 IN | BODY MASS INDEX: 42.27 KG/M2 | RESPIRATION RATE: 18 BRPM | WEIGHT: 263 LBS

## 2023-07-11 DIAGNOSIS — C18.9 METASTATIC COLON CANCER TO LIVER (HCC): Primary | ICD-10-CM

## 2023-07-11 DIAGNOSIS — C78.7 METASTATIC COLON CANCER TO LIVER (HCC): Primary | ICD-10-CM

## 2023-07-11 PROCEDURE — 99204 OFFICE O/P NEW MOD 45 MIN: CPT | Performed by: INTERNAL MEDICINE

## 2023-07-11 RX ORDER — SODIUM CHLORIDE 9 MG/ML
20 INJECTION, SOLUTION INTRAVENOUS ONCE AS NEEDED
OUTPATIENT
Start: 2023-07-31

## 2023-07-11 RX ORDER — FLUOROURACIL 50 MG/ML
400 INJECTION, SOLUTION INTRAVENOUS ONCE
OUTPATIENT
Start: 2023-07-31

## 2023-07-11 RX ORDER — LIDOCAINE AND PRILOCAINE 25; 25 MG/G; MG/G
CREAM TOPICAL AS NEEDED
Qty: 30 G | Refills: 3 | Status: SHIPPED | OUTPATIENT
Start: 2023-07-11

## 2023-07-11 RX ORDER — PROCHLORPERAZINE MALEATE 10 MG
10 TABLET ORAL EVERY 6 HOURS PRN
Qty: 30 TABLET | Refills: 3 | Status: SHIPPED | OUTPATIENT
Start: 2023-07-11

## 2023-07-11 RX ORDER — ONDANSETRON HYDROCHLORIDE 8 MG/1
8 TABLET, FILM COATED ORAL EVERY 8 HOURS PRN
Qty: 30 TABLET | Refills: 3 | Status: SHIPPED | OUTPATIENT
Start: 2023-07-11

## 2023-07-11 RX ORDER — DEXTROSE MONOHYDRATE 50 MG/ML
20 INJECTION, SOLUTION INTRAVENOUS ONCE
OUTPATIENT
Start: 2023-07-31

## 2023-07-11 NOTE — TELEPHONE ENCOUNTER
Please start chemo 7/21 or later, schedule port labs, she wants [address]. Please schedule with Dr Julia Suresh in [place] prior to cycle 2.

## 2023-07-11 NOTE — TELEPHONE ENCOUNTER
Spoke to patient she is aware for her first cycle she will need to go to outpatient labs. Patient is also aware [facility] will not be taking her to get her port placed she is aware she will need a . [facility] aware of all infusion appts. Per patient will check mychart for remaining cycles.

## 2023-07-11 NOTE — TELEPHONE ENCOUNTER
Raine Marquis is being placed 7/28       While we try to accommodate patient requests, our priority is to schedule treatment according to Doctor's orders and site availability. 1. Does the Provider use the intake sheet or checkout note? 2. What would be a preferred day of the week that would work best for your infusion appointment? Monday/ Wednesday   3. Do you prefer mornings or afternoons for your appointments? Mornings   4. Are there any days or dates that do not work for your schedule, including any upcoming vacations? N/A  5. We are going to try our best to schedule you at the infusion center closest to your home. In the event that we are unable to what would be your next preferred infusion site or sites? 1. [place]  2. [place]    6. Do you have transportation to take you to all of your appointments? [facility]  7.  Would you like the infusion center to draw labs from your port? (disregard if patient doesn't have a port or need labs for infusion appointment) YES

## 2023-07-11 NOTE — PROGRESS NOTES
Pt called and stated that shes been waiting for  [facility]  For over an hour to take her back home from her appointment.   She asked the  to call  [facility], I told her if they dont come soon to call me and I will e mail them

## 2023-07-11 NOTE — PROGRESS NOTES
e66.01  [address] [facility] coding opportunities          Chart Reviewed number of suggestions sent to Provider: 1     Patients Insurance     Medicare Insurance: [employer]

## 2023-07-11 NOTE — TELEPHONE ENCOUNTER
I called Maira to schedule a new patient appointment with the Cancer Risk and Genetics Program.      Outcome:  Genetics appointment scheduled for 8/4 at 11:30    Personal/Family History Related to Appointment:  Recently dx with Colon Ca, hx of breast ca. Fhx of colon ca, pancreatic ca.  Non-Druze    History of Genetic Testing:  Patient reports no personal or family history of genetic testing    Genetics Family History Questionnaire:  I confirmed the patient's e-mail on file as the best e-mail to send an invite link for our genetics family history intake

## 2023-07-13 ENCOUNTER — DOCUMENTATION (OUTPATIENT)
Dept: HEMATOLOGY ONCOLOGY | Facility: CLINIC | Age: 68
End: 2023-07-13

## 2023-07-13 ENCOUNTER — OFFICE VISIT (OUTPATIENT)
Dept: OTOLARYNGOLOGY | Facility: CLINIC | Age: 68
End: 2023-07-13
Payer: MEDICARE

## 2023-07-13 DIAGNOSIS — R59.0 CERVICAL LYMPHADENOPATHY: ICD-10-CM

## 2023-07-13 DIAGNOSIS — J35.8 TONSILLAR MASS: Primary | ICD-10-CM

## 2023-07-13 PROCEDURE — 99214 OFFICE O/P EST MOD 30 MIN: CPT | Performed by: OTOLARYNGOLOGY

## 2023-07-13 NOTE — PROGRESS NOTES
Oncology Outpatient Consult Note  Nancy Morrissey 80 y.o. female MRN: @ Encounter: [id number]        Date:  7/13/2023        CC:  New diagnosis metastatic colon cancer       HPI:  Nancy Morrissey is seen for initial consultation 7/13/2023 regarding her newly diagnosed metastatic colon cancer. She started having a sore throat in February of this year that eventually led to a CT of the neck. This showed lymphadenopathy and a thyroid lesion. She had an FNA of the lymphadenopathy in the neck that showed carcinoma but it was not further classified beyond that. The PET scan that showed diffuse metastatic disease in the lymph nodes of the neck as well as the liver. She had a biopsy of the liver lesion that showed adenocarcinoma with a possible colon primary she just had a colonoscopy that showed a mass in the ascending colon that was not traversable. This was adenocarcinoma as well. She also had an MRI of the abdomen that showed a thickened endometrium and a uterine fibroid. Her other medical problems include hypertension, prediabetes, TIA. She also saw Dr. Batsheva Duffy in the past for a early-stage ER positive breast cancer with a low Oncotype. She has been on anastrozole since February 2019. She does not have any neuropathy from diabetes. She has been worked up by Dr. Segundo Ray who had a concern for a potential FDG avid area in the tonsil in the setting of the neck lymphadenopathy. She has an appointment with ENT on July 20 for further endoscopic biopsies and exam.  She has lost about 10 pounds in the last month. She has a decreased appetite. She denies any bright red blood per rectum. No shortness of breath. No falls in the past 6 months. She does have some fatigue. She has 5 children and lives with her daughter. She is here by herself today. She department denies tobacco or alcohol. She does have some poor dentition on the bottom.   Her family history is remarkable for mother with colon cancer diagnosed in her late 46s. Her father had pancreatic cancer. ROS: As stated in history of present illness otherwise her 14 point review of systems today was negative. ECOG PS: 0    Cancer Staging:  Cancer Staging   Malignant neoplasm of right female breast ([address])  Staging form: Breast, AJCC 8th Edition  - Clinical: No stage assigned - Unsigned  Laterality: Right  - Pathologic: Stage IA (pT2, pN0, cM0, G2, ER: Positive, CO: Positive, HER2: Negative) - Signed by Samy Suggs MD on 1/11/2019  Histologic grading system: 3 grade system    Metastatic colon cancer to liver [facility])  Staging form: Colon and Rectum, AJCC 8th Edition  - Clinical stage from 7/13/2023: Stage Unknown (cTX, cNX, pM1) - Signed by Abel Ovalles DO on 7/13/2023      Molecular Testing:     Oncology History Overview Note   2/2019 - pT2N0 breast cancer treated with anastrozole, oncotype 13, ER+ CO+ Her2 neg    7/2023 - metastatic ascending colon adenocarcinoma to liver     Malignant neoplasm of right female breast ([address])   11/23/2018 Initial Diagnosis    Malignant neoplasm of right female breast ([address])     11/23/2018 Biopsy    Rt Breast US BX 1100 8cmfn, 4 passes 12g Marquee:  - Invasive breast carcinoma of no special type (ductal NST/invasive ductal carcinoma).   - grade 2  - %  - CO 95%  - HER2 negative     12/20/2018 Surgery    Right partial mastectomy and SLN biopsy:  Infiltrating ductal carcinoma, Grade 2/3, felt to be between 2.0 cm and 2.5 cm in greatest dimension  - No invasive tumor is seen at inked margins, but there is a focus of DCIS seen within approximately 1mm of the inked medial margin  - no LVI  - no LCIS  - 0/2 LN's  at least Stage IIA - pT2, pN0, cM0, G2.    - Dr Cristy Joiner     12/20/2018 -  Cancer Staged    Stage IIA - pT2, pN0, cM0, G2.       1/4/2019 Genomic Testing    Oncotype DX score: 13     2/1/2019 -  Hormone Therapy    anastrozole 1 mg daily as adjuvant endocrine therapy    - Dr Radha Morrow       2/14/2019 - 4/3/2019 Radiation Course: C1    Plan ID Energy Fractions Dose per Fraction (cGy) Dose Correction (cGy) Total Dose Delivered (cGy) Elapsed Days   R Breast 10X/6X 25 / 25 200 0 5,000 40   R BRST BOOST 16E 5 / 5 200 0 1,000 7      Treatment dates:  C1: 2/14/2019 - 4/3/2019       Metastatic colon cancer to liver (HCC)   7/11/2023 Initial Diagnosis    Metastatic colon cancer to liver ([address])     7/13/2023 -  Cancer Staged    Staging form: Colon and Rectum, AJCC 8th Edition  - Clinical stage from 7/13/2023: Stage Unknown (cTX, cNX, pM1) - Signed by Asia Pate DO on 7/13/2023 7/31/2023 -  Chemotherapy    alteplase (CATHFLO), 2 mg, Intracatheter, Every 1 Minute as needed, 0 of 12 cycles  fluorouracil (ADRUCIL), 400 mg/m2 = 895 mg, Intravenous, Once, 0 of 12 cycles  leucovorin calcium IVPB, 400 mg/m2 = 896 mg, Intravenous, Once, 0 of 12 cycles  oxaliplatin (ELOXATIN) chemo infusion, 85 mg/m2 = 190.4 mg, Intravenous, Once, 0 of 12 cycles  fluorouracil (ADRUCIL) ambulatory infusion Soln, 1,200 mg/m2/day = 5,375 mg, Intravenous, Over 46 hours, 0 of 12 cycles             Test Results:    Imaging: MRI abdomen and pelvis cancer staging wo and w contrast    Result Date: 7/10/2023  Narrative: MRI OF THE ABDOMEN AND PELVIS WITH AND WITHOUT CONTRAST INDICATION: 50-year-old female with history malignant neoplasm of thyroid gland and right breast cancer. Recent PET/CT on 6/19/2023 demonstrated hypermetabolic hepatic masses, right adnexal mass, and focal hypermetabolic concentric mural thickening of the proximal to mid ascending colon. Liver mass biopsy on 6/27/2023 demonstrated metastatic adenocarcinoma, suggestive of colorectal primary. Colonoscopy on 7/6/2023 demonstrated a malignant-appearing mass in the mid descending colon covering the whole circumference with pathology revealing well differentiated adenocarcinoma. COMPARISON: PET/CT 6/19/2023.  TECHNIQUE: Multiplanar/multisequence MRI of the abdomen pelvis was performed before and after administration of contrast. Imaging performed on 1.5T MRI IV Contrast:  12 mL of Gadobutrol injection (SINGLE-DOSE) FINDINGS: MRI ABDOMEN: LIVER:  Moderate hepatic steatosis. Multiple (at least 8) rim-enhancing hepatic masses throughout the right and left hepatic lobes which are mildly T2 hyperintense, restrict diffusion, and measure up to 4.7 cm in segment 2 (15/16), consistent with hepatic metastases. Nonocclusive bland thrombus in the anterior right portal vein. The left portal vein and posterior right portal veins are patent. The hepatic veins are patent. BILIARY TREE:  There is no intra-hepatic biliary ductal dilatation. The common bile duct is normal in caliber. There is no gross evidence of mass or choledocholithiasis. GALLBLADDER:  The gallbladder is normal in size and morphology. Cholelithiasis. There is no gallbladder wall thickening or pericholecystic fluid. PANCREAS:  Unremarkable. ADRENAL GLANDS:  The adrenal glands are normal in morphology without thickening or focal mass. SPLEEN:  The spleen is normal in size, morphology and signal intensity. No focal mass is identified. KIDNEYS:   No suspicious renal mass. No hydronephrosis. LOWER CHEST:   Small hiatal hernia. MRI PELVIS: UTERUS:   The uterus is normal in size and morphology. No focal mass is identified. The junctional zone is normal in thickness. Thickened endometrium measuring 7 mm within enhancing endometrial lesion measuring 5 mm (27/61). Nabothian cyst. ADNEXA:   The ovaries are normal in size and morphology. No evidence of hydrosalpinx. Right adnexal solid mass abutting the uterus measuring 7.4 x 5.5 x 4.1 cm (5/29, 4/31), which is T2 hypointense, T1 isointense, no restricted diffusion, no intralesional fat, with heterogenous enhancement on postcontrast imaging. This finding likely represents a pedunculated uterine fibroid. BLADDER: Unremarkable. OTHER STRUCTURES OF THE ABDOMEN AND PELVIS LYMPH NODES:   There is no lymphadenopathy. MESENTERY:  Mesenteric fat is normal in signal without evidence of stranding or edema. No mesenteric nodularity or focal mass is identified. Susceptibility artifact in the left paracolic gutter. BOWEL:   Partially visualized circumferential mural thickening and enhancement of the ascending colon (20/230), likely corresponding to the patient's known colonic malignancy. OSSEOUS STRUCTURES: No suspicious osseous lesion. Degenerative disc disease of L5-S1. VASCULAR STRUCTURES:   The visualized vascular are patent. No aneurysm. Impression: Multiple hepatic metastases. Nonocclusive bland thrombus in the anterior right portal vein. Partially visualized circumferential mural thickening and enhancement of the ascending colon, likely corresponding to the patient's known colonic malignancy. Thickened endometrium with a 5 mm enhancing endometrial lesion. Differential diagnosis includes endometrial polyp and/or endometrial carcinoma. Recommend further evaluation with pelvic ultrasound. Right adnexal mass abutting the uterus, likely representing a pedunculated uterine fibroid. The study was marked in EPIC for significant notification. Workstation performed: [id number]     Colonoscopy    Result Date: 7/6/2023  Narrative: Table formatting from the original result was not included. [address] [place] [address] End[address] [place] [address] DATE OF SERVICE: 7/06/23 PHYSICIAN(S): Attending: Ward Gonzales MD Fellow: No Staff Documented INDICATION: Abnormal PET scan of colon POST-OP DIAGNOSIS: See the impression below. HISTORY: Prior colonoscopy: No prior colonoscopy. BOWEL PREPARATION: Golytely/Colyte/Trilyte PREPROCEDURE: Informed consent was obtained for the procedure, including sedation. Risks including but not limited to bleeding, infection, perforation, adverse drug reaction and aspiration were explained in detail.  Also explained about less than 100% sensitivity with the exam and other alternatives. The patient was placed in the left lateral decubitus position. Procedure: Colonoscopy DETAILS OF PROCEDURE: Patient was taken to the procedure room where a time out was performed to confirm correct patient and correct procedure. The patient underwent monitored anesthesia care, which was administered by an anesthesia professional. The patient's blood pressure, heart rate, level of consciousness, oxygen, respirations and ECG were monitored throughout the procedure. A digital rectal exam was performed. The scope was introduced through the anus and advanced to the ascending colon. Retroflexion was performed in the rectum. The quality of bowel preparation was evaluated using the [facility] Bowel Preparation Scale with scores of: right colon = 2, transverse colon = 2, left colon = 2. The total BBPS score was 6. Bowel prep was adequate. The patient experienced no blood loss. The procedure was moderately difficult due to loops in the digestive tract, tortuous colon and ascending colon mass. The patient tolerated the procedure well. There were no apparent adverse events. ANESTHESIA INFORMATION: ASA: III Anesthesia Type: IV Sedation with Anesthesia MEDICATIONS: No administrations occurring from 1100 to 1133 on 07/06/23 FINDINGS: Single friable and malignant-appearing mass (not traversable) in the mid ascending colon, covering the whole circumference; performed partial removal by cold forceps biopsy; tattooed 4 cm distal to the finding with 3 mL.  Total length of circumferential mass unable to be determined, but > 30 mm at least. One sessile, adenomatous-appearing polyp measuring 10-19 mm in the descending colon; completely removed en bloc by cold snare and retrieved specimen; placed 1 clip successfully (clip is MRI conditional); hemostasis achieved EVENTS: Procedure Events Event Event Time ENDO SCOPE OUT TIME 7/6/2023 11:32 AM SPECIMENS: ID Type Source Tests Collected by Time Destination 1 : ascending colon circumferential mass, cold bx Tissue Large Intestine, Right/Ascending Colon TISSUE EXAM Vickie Hinojosa MD 7/6/2023 11:25 AM  2 : descending colon polyp, cold snare Tissue Polyp, Colorectal TISSUE EXAM Vickie Hinojosa MD 7/6/2023 11:28 AM  EQUIPMENT: Colonoscope -[id number]     Impression: Single friable and malignant-appearing mass (not traversable) in the mid ascending colon, covering the whole circumference; performed partial removal by cold forceps biopsy; tattooed 4 cm distal to the finding One sessile, adenomatous-appearing polyp measuring 10-19 mm in the descending colon; removed by cold snare; placed 1 clip successfully; hemostasis achieved RECOMMENDATION:  Await pathology results  Follow-up with surgical oncology and oncology. Vickie Hinojosa MD     IR biopsy liver mass    Result Date: 6/27/2023  Narrative: Ultrasound-guided  biopsy of the liver lesions Clinical History: Patient with history of breast cancer. Now with PET positive liver lesions for biopsy. . Technique: The patient was brought to the ultrasound area and placed supine on the stretcher. After a brief ultrasound examination was performed of the liver, and correlated with prior imaging, a site on the upper abdominal wall laterally was prepped and  draped in usual sterile fashion. Lidocaine was administered to the skin and a 17 G coaxial needle was advanced into the right hepatic lobe lesion under direct ultrasound guidance. Three 18-gauge specimens were obtained. The specimen was found to be adequate. A full details A pathology report will follow. D-Stat slurry placed through the coaxial needle. The patient tolerated the procedure well and suffered no complications. Impression: Impression: Successful fine needle aspiration biopsy of right hepatic lobe lesion yielding an adequate specimen . A full report will follow.  Workstation performed: [id number]     NM PET CT skull base to mid thigh    Result Date: 6/20/2023  Narrative: WHOLE-BODY PET/CT SCAN INDICATION: C73: Malignant neoplasm of thyroid gland The following clinical information was obtained directly from EPIC: h/o newly dx carcinoma of the Rt neck, unknown primary site. Recent CT showed heterogenous Rt thyroid lobe nodule (possible primary) and a mild asymmetric prominence of Rt palantine tonsil. Initial staging. h/o Rt breast ca. s/p Rt partial mastectomy/SLN bx 12/20/18 (+ infiltrating ductal carcinoma, ER/KS +/HER2-). Received RT to Rt breast 4/2019; pt continues on daily Anastrozole MODIFIER: PI COMPARISON: CT of neck dated 3/30/2023, thyroid ultrasound dated 4/27/2023, CELL TYPE:  suspicious for malignancy; clusters of atypical epithelioid cells (bx 5/26/23 Rt neck LN; level 2 +) TECHNIQUE: Following the intravenous administration of 12.9 mCi F-18-FDG, dedicated head and neck images were obtained from the skull vertex to the thoracic inlet with the patient's arms at their side 60 minutes post injection. Subsequently, whole body images were obtained from the thoracic inlet through the proximal femurs with the patients arms above their head. Multiplanar attenuation corrected and non attenuation corrected PET images are available for interpretation. Contiguous, low dose, axial CT sections were also obtained from the head through the thoracic inlet and from the thoracic inlet through the proximal femurs. Intravenous contrast material was not utilized. Additional coronal and sagittal images are available as well as fused PET/CT images. This examination, like all CT scans performed in the [facility], was performed utilizing techniques to minimize radiation dose exposure, including the use of iterative reconstruction and automated exposure control. Fasting serum glucose: 106 mg/dl FINDINGS: VISUALIZED BRAIN: No acute abnormalities are seen.  HEAD/NECK: On image 38 of series 17 there is asymmetric nodular FDG activity involving the right tonsil region where there appears to be slight asymmetric nodular soft tissue measuring 1.5 cm with max SUV of 32; given the asymmetric nodular appearance of the right tonsil region, further evaluation with direct visualization and possibly tissue sampling is recommended to exclude tonsillar carcinoma. There is additional prominent tracer activity involving the left tonsillar region and symmetric hypermetabolic soft tissue involving the base of the tongue extending to the vallecular regions of uncertain clinical significance and possibly inflammatory in etiology with underlying hypermetabolic malignancy in these locations not excluded. These regions can also be further characterized with direct visualization as clinically indicated. There is right-sided hypermetabolic cervical lymphadenopathy most consistent with cervical claudio metastatic disease with primary malignancy/lymphoproliferative malignancy not excluded. Representative hypermetabolic lymph node is noted in the right level 2 region on image 42 of series 17 measuring approximately 3.3 x 2.4 cm with max SUV of 22. Additional smaller hypermetabolic right cervical lymph nodes are noted extending posteriorly and inferiorly from this dominant right level 2 cervical lymph node subjacent to the right sternocleidomastoid muscle. There are mildly prominent, borderline enlarged mildly FDG avid left cervical lymph nodes of uncertain clinical significance; for example, on image 44 of series 17 there is a left level 2 cervical lymph node measuring 0.9 cm in short axis with max SUV of  4.1; while findings could reflect benign inflammatory/reactive lymph nodes, findings are suspicious for developing left-sided cervical claudio metastatic disease. Patient's known right-sided heterogeneous thyroid nodule is seen on image 61 of series 17 measuring approximately 2.1 cm without focal FDG activity.  This thyroid nodule remains of uncertain clinical significance CT images: Intracranial atherosclerotic calcification is noted. CHEST: On image 55 of series 3 there is a 0.8 cm right lower lobe thyroid nodule with slight focal FDG activity with max SUV of 2.2 suspicious but not definitive for hypermetabolic malignancy/metastatic disease. CT images: Atherosclerotic vascular calcifications including those of the coronary arteries are noted. ABDOMEN: There are multiple (almost 10) hypermetabolic hepatic masses most consistent with hypermetabolic metastatic disease which are poorly characterized on low-dose unenhanced CT. Representative hypermetabolic hepatic mass measures approximate 4.4 cm on image 89 of series 3 involving the left hepatic lobe with max SUV of 17. Best seen on image 141 of series 3 is a short segment of intense focal FDG activity involving the proximal to mid ascending colon where there is mild concentric mural thickening and mild infiltration of the adjacent mesenteric fat with max SUV of 20.0 highly suspicious for hypermetabolic colonic malignancy with differential diagnosis including less likely but not excluded focal colitis. CT images: Cholelithiasis. Atherosclerotic vascular calcifications are noted. Small hiatal hernia. PELVIS: Best seen on image 173 of series 3 is a lobulated large right pelvic mass in the right adnexal region measuring 7.8 x 5.4 cm which exhibits minimal to mild patchy FDG activity and is of uncertain clinical significance with differential diagnosis including exophytic fibroid versus right adnexal/ovarian mass. This finding can be further characterized with pelvic ultrasound (MRI if needed). CT images: Unremarkable. OSSEOUS STRUCTURES: No FDG avid lesions are  seen. CT images: Degenerative changes are noted involving the spine. Impression: 1.  Multifocal hypermetabolic malignancy/metastatic disease involving the neck, abdomen, and likely chest. Specifically in the neck, there is hypermetabolic cervical lymphadenopathy as well as hypermetabolic asymmetric soft tissue prominence to the right tonsillar region for which correlation with direct visualization and possibly tissue sampling is recommended to exclude the possibility of underlying right tonsillar malignancy. Additional nonspecific hypermetabolic soft tissue is noted in the left tonsillar region extending to the base of the tongue/vallecular regions. Patient's known right thyroid  lobe nodule is non-FDG avid and remains of uncertain clinical significance. In the chest, there is a 0.8 cm minimally to mildly FDG avid right lower lobe nodule suspicious for malignancy/metastatic disease. In the abdomen, there is multifocal hypermetabolic hepatic malignancy/metastatic disease. Additionally, there is focal hypermetabolic concentric mural thickening of the proximal to mid ascending colon suspicious for colonic malignancy which can be further characterized with direct visualization/colonoscopy. 2. Indeterminant 7.8 cm minimally to mildly FDG avid lobulated right pelvic mass in the right adnexal region which is poorly characterized on low-dose unenhanced CT and could reflect exophytic uterine fibroid or right adnexal/ovarian mass. Further evaluation with pelvic ultrasound (MRI if needed) is recommended for further characterization as clinically indicated. Please see above for details and additional findings. The study was marked in EPIC for significant notification. The study was marked in Epic for follow-up. Workstation performed: [id number]     XR chest pa & lateral    Result Date: 6/15/2023  Narrative: CHEST INDICATION:   R59.0: Localized enlarged lymph nodes. COMPARISON: CXR 12/11/2018 and neck CT 3/30/2023. EXAM PERFORMED/VIEWS:  XR CHEST PA & LATERAL. FINDINGS: Cardiomediastinal silhouette normal. Lungs clear. No effusion or pneumothorax. Upper abdomen normal. Bones normal for age. Impression: No acute cardiopulmonary disease.  Workstation performed: [id number]       Labs:   Lab Results   Component Value Date    WBC 9.45 06/27/2023    HGB 10.3 (L) 06/27/2023    HCT 32.0 (L) 06/27/2023    MCV 83 06/27/2023     06/27/2023     Lab Results   Component Value Date    K 4.2 06/13/2023     06/13/2023    CO2 26 06/13/2023    BUN 13 06/13/2023    CREATININE 0.88 06/13/2023    GLUF 116 (H) 06/13/2023    CALCIUM 9.9 06/13/2023    AST 44 (H) 06/13/2023    ALT 39 06/13/2023    ALKPHOS 123 (H) 06/13/2023    EGFR 68 06/13/2023         No results found for: "SPEP", "UPEP"    No results found for: "PSA"    No results found for: "CEA"    No results found for: "AFP"    No results found for: "IRON", "TIBC", "FERRITIN"    Lab Results   Component Value Date    RAMJQMSL13 234 05/23/2019               Active Problems:   Patient Active Problem List   Diagnosis   • Primary hypertension   • Vasomotor rhinitis   • Mixed hyperlipidemia   • Screen for colon cancer   • Malignant neoplasm of right female breast ([address])   • Vitamin D deficiency   • Encounter for screening for HIV   • Prediabetes   • Cervical lymphadenopathy   • Follow-up examination   • Right thyroid nodule   • GERD (gastroesophageal reflux disease)   • Stroke [facility])   • Obesity   • Metastatic colon cancer to liver [facility])       Past Medical History:   Past Medical History:   Diagnosis Date   • Anemia    • Arthritis    • Breast cancer ([address]) 12/17/2018   • Cancer ([address])     right breast   • GERD (gastroesophageal reflux disease)    • Hyperlipidemia    • Hypertension    • Obesity    • Stroke [facility])     TIA    • Transaminitis 9/22/2021       Surgical History:   Past Surgical History:   Procedure Laterality Date   • BREAST BIOPSY Right 11/23/2018    us guided bx cancer   • BREAST SURGERY     • IR BIOPSY LIVER MASS  6/27/2023   • MA MASTECTOMY PARTIAL W/AXILLARY LYMPHADENECTOMY Right 12/20/2018    Procedure: ULTRASOUND LOCALIZED PARTIAL MASTECTOMY W/SENTINEL NODE BIOPSY POSS AXILLARY DISSECTION;  Surgeon: Alicia Crook MD;  Location:  MAIN OR;  Service: General   • TUBAL LIGATION     • US GUIDED BREAST BIOPSY RIGHT COMPLETE Right 11/23/2018   • US GUIDED INJECTION FOR RESEARCH STUDY  12/20/2018   • US GUIDED INJECTION FOR RESEARCH STUDY  12/7/2018   • US GUIDED INJECTION FOR RESEARCH STUDY  5/3/2019   • US GUIDED LYMPH NODE BIOPSY RIGHT  5/26/2023       Family History:    Family History   Problem Relation Age of Onset   • Colon cancer Mother 62   • Hypertension Mother    • Cancer Father    • Pancreatic cancer Father 61   • Hypertension Son    • Hypertension Daughter        Cancer-related family history includes Cancer in her father; Colon cancer (age of onset: 62) in her mother; Pancreatic cancer (age of onset: 61) in her father. Social History:   Social History     Socioeconomic History   • Marital status: Single     Spouse name: Not on file   • Number of children: Not on file   • Years of education: Not on file   • Highest education level: Not on file   Occupational History   • Not on file   Tobacco Use   • Smoking status: Never   • Smokeless tobacco: Never   • Tobacco comments:     NO TOBACCO USE   Vaping Use   • Vaping Use: Never used   Substance and Sexual Activity   • Alcohol use: No   • Drug use: No   • Sexual activity: Not on file   Other Topics Concern   • Not on file   Social History Narrative   • Not on file     Social Determinants of Health     Financial Resource Strain: Low Risk  (7/7/2022)    Overall Financial Resource Strain (CARDIA)    • Difficulty of Paying Living Expenses: Not hard at all   Food Insecurity: No Food Insecurity (7/7/2022)    Hunger Vital Sign    • Worried About Running Out of Food in the Last Year: Never true    • Ran Out of Food in the Last Year: Never true   Transportation Needs: No Transportation Needs (7/7/2022)    PRAPARE - Transportation    • Lack of Transportation (Medical): No    • Lack of Transportation (Non-Medical):  No   Physical Activity: Not on file   Stress: Not on file   Social Connections: Not on file   Intimate Partner Violence: Not on file   Housing Stability: Not on file       Current Medications:   Current Outpatient Medications   Medication Sig Dispense Refill   • anastrozole (ARIMIDEX) 1 mg tablet Take 1 tablet (1 mg total) by mouth daily 90 tablet 3   • atorvastatin (LIPITOR) 40 mg tablet Take 1 tablet (40 mg total) by mouth daily at bedtime 30 tablet 2   • lidocaine-prilocaine (EMLA) cream Apply topically as needed for mild pain 30 g 3   • losartan (COZAAR) 50 mg tablet Take 1 tablet (50 mg total) by mouth daily 90 tablet 1   • omeprazole (PriLOSEC) 20 mg delayed release capsule Take 1 capsule (20 mg total) by mouth daily 30 capsule 5   • ondansetron (ZOFRAN) 8 mg tablet Take 1 tablet (8 mg total) by mouth every 8 (eight) hours as needed for nausea or vomiting 30 tablet 3   • prochlorperazine (COMPAZINE) 10 mg tablet Take 1 tablet (10 mg total) by mouth every 6 (six) hours as needed for nausea or vomiting 30 tablet 3   • b complex vitamins tablet Take 1 tablet by mouth daily (Patient not taking: Reported on 6/30/2023)     • bisacodyl (DULCOLAX) 5 mg EC tablet Take as directed according to bowel prep instructions (Patient not taking: Reported on 7/11/2023) 2 tablet 0   • ergocalciferol (VITAMIN D2) 50,000 units Take 1 capsule (50,000 Units total) by mouth once a week (Patient not taking: Reported on 7/11/2023) 4 capsule 2   • folic acid (FOLVITE) 1 mg tablet Take 1 tablet (1,000 mcg total) by mouth daily (Patient not taking: Reported on 6/30/2023) 30 tablet 2   • polyethylene glycol (GOLYTELY) 4000 mL solution Take 4,000 mL by mouth once for 1 dose 4000 mL 0   • traMADol (Ultram) 50 mg tablet Take 1 tablet (50 mg total) by mouth every 6 (six) hours as needed for moderate pain (Patient not taking: Reported on 6/30/2023) 10 tablet 0   • vitamin B-12 (VITAMIN B-12) 1,000 mcg tablet Take 1 tablet (1,000 mcg total) by mouth daily (Patient not taking: Reported on 6/30/2023) 90 tablet 1     No current facility-administered medications for this visit.        Allergies: No Known Allergies      Physical Exam:    Body surface area is 2.24 meters squared. Wt Readings from Last 3 Encounters:   07/11/23 119 kg (263 lb)   07/06/23 120 kg (264 lb)   07/05/23 120 kg (264 lb)        Temp Readings from Last 3 Encounters:   07/11/23 (!) 97.2 °F (36.2 °C)   07/06/23 97.7 °F (36.5 °C) (Tympanic)   06/30/23 97.8 °F (36.6 °C) (Tympanic)        BP Readings from Last 3 Encounters:   07/11/23 138/76   07/06/23 120/60   06/30/23 135/88         Pulse Readings from Last 3 Encounters:   07/11/23 76   07/06/23 78   06/30/23 85     @LASTSAO2(3)@    Physical Exam     Constitutional   General appearance: No acute distress, well appearing and well nourished. Eyes   Conjunctiva and lids: No swelling, erythema or discharge. Pupils and irises: Equal, round and reactive to light. Ears, Nose, Mouth, and Throat   External inspection of ears and nose: Normal.    Nasal mucosa, septum, and turbinates: Normal without edema or erythema. Oropharynx: Normal with no erythema, edema, exudate or lesions. Pulmonary   Respiratory effort: No increased work of breathing or signs of respiratory distress. Auscultation of lungs: Clear to auscultation. Cardiovascular   Palpation of heart: Normal PMI, no thrills. Auscultation of heart: Normal rate and rhythm, normal S1 and S2, without murmurs. Examination of extremities for edema and/or varicosities: Normal.    Carotid pulses: Normal.    Abdomen   Abdomen: Non-tender, no masses. Liver and spleen: No hepatomegaly or splenomegaly. Lymphatic   Palpation of lymph nodes in neck: No lymphadenopathy. Musculoskeletal   Gait and station: Normal.    Digits and nails: Normal without clubbing or cyanosis. Inspection/palpation of joints, bones, and muscles: Normal.    Skin   Skin and subcutaneous tissue: Normal without rashes or lesions. Neurologic   Cranial nerves: Cranial nerves 2-12 intact. Sensation: No sensory loss.     Psychiatric   Orientation to person, place, and time: Normal.    Mood and affect: Normal.          Assessment/ Plan: 27-year-old female with a history of early-stage breast cancer on anastrozole now with a new diagnosis of metastatic colon cancer. I agree with evaluating her upper airway and tonsil because there could be a concern for second primary cancer, especially with the amount of lymphadenopathy in her neck. For now we talked about her metastatic colon cancer. This is not something that is curable that we can certainly treated. I am going to send Danilo testing on her liver biopsy. I have ordered port placement and we discussed starting FOLFOX chemotherapy. Risks and benefits of FOLFOX were reviewed and consent was signed. By the time we get the port and I will likely be started just after her ENT evaluation. I will plan on seeing her prior to cycle 2 of FOLFOX to review that biopsy and to check on tolerance of treatment. I have also referred her to genetics due to her personal and family history of cancer. I made sure she has our contact number if she has any questions and if her children have any questions they are also welcome to give us a call. I spent 45 minutes in chart review, face to face counseling, coordination of care, and documentation.

## 2023-07-13 NOTE — PROGRESS NOTES
Received email from [address] [place] [address] to revw the pts coverage. Per the email it says that the [facility] portal is showing the coverage is active however the pt sd that the uhc termd 06/30/23. She only has Iván Bazzi spoke to lyn call ref# [id number] he sd that the plan was active 01/01/23-06/30/23.  I told him that the portal is showing its active he sd that the portal is a few days behind   There is no [facility] coverage  Emailed that to the team,  Called the pt talk to her about applying for medicaid got her voicemail left a message for pt to call me back  Added to titi

## 2023-07-13 NOTE — PROGRESS NOTES
[facility]  Linda Alfaro 80 y.o. female MRN: [id number]  Encounter: [id number]  Brianna Krishna MD  Office : [phone]  Also available on [employer]    Thank you for referring Linda Alfaro for an evaluation. My recommendations are included. Please do not hesitate to contact me with any questions you may have. ASSESSMENT AND PLAN:      Diagnoses and all orders for this visit:    Tonsillar mass    Cervical lymphadenopathy       Patient with cervical lymphadenopathy and tonsillar mass previously seen by Dr. Carlos Wilder and is scheduled for OR for DL with biopsy on 7/20. Plan to proceed to OR for further diagnosis of the neck mass. ___________________________________________________________________    Reason for consultation : lymphadenopathy    HPI: Linda Alfaro is a 80 y.o. female who presents after being diagnosed with metastatic carcinoma. Denies trouble swallowing. Pain in the Left neck with swelling since February. Denies erythema. Has been having sore throat. PET CT showed suspicious nodes level IIa on Left neck and FNA was done and suspicious for carcinoma. Also a small pulmonary nodule mildly FDG avid as well as liver lesions. This are suspicious for metastasis of colorectal adenocarcinoma. Scheduled for OR with Dr. Carlos Wilder for DL w/biopsy on 7/20. for evaluation of the possible H&N malignancy.     PRIOR VISIT, ENDOSCOPIC EVALUATION :     REVIEW OF SYSTEMS:  Per HPI    Historical Information   Past Medical History:   Diagnosis Date   • Anemia    • Arthritis    • Breast cancer ([address]) 12/17/2018   • Cancer ([address])     right breast   • GERD (gastroesophageal reflux disease)    • Hyperlipidemia    • Hypertension    • Obesity    • Stroke [facility])     TIA    • Transaminitis 9/22/2021     Past Surgical History:   Procedure Laterality Date   • BREAST BIOPSY Right 11/23/2018    us guided bx cancer   • BREAST SURGERY     • IR BIOPSY LIVER MASS  6/27/2023   • TN MASTECTOMY PARTIAL W/AXILLARY LYMPHADENECTOMY Right 12/20/2018    Procedure: ULTRASOUND LOCALIZED PARTIAL MASTECTOMY W/SENTINEL NODE BIOPSY POSS AXILLARY DISSECTION;  Surgeon: Hermes Colon MD;  Location: [address] [place] [address] OR;  Service: General   • TUBAL LIGATION     • US GUIDED BREAST BIOPSY RIGHT COMPLETE Right 11/23/2018   • US GUIDED INJECTION FOR RESEARCH STUDY  12/20/2018   • US GUIDED INJECTION FOR RESEARCH STUDY  12/7/2018   • US GUIDED INJECTION FOR RESEARCH STUDY  5/3/2019   • US GUIDED LYMPH NODE BIOPSY RIGHT  5/26/2023     Social History   Social History     Substance and Sexual Activity   Alcohol Use No     Social History     Substance and Sexual Activity   Drug Use No     Social History     Tobacco Use   Smoking Status Never   Smokeless Tobacco Never   Tobacco Comments    NO TOBACCO USE     Family History   Problem Relation Age of Onset   • Colon cancer Mother 62   • Hypertension Mother    • Cancer Father    • Pancreatic cancer Father 61   • Hypertension Son    • Hypertension Daughter        Meds/Allergies       Current Outpatient Medications:   •  anastrozole (ARIMIDEX) 1 mg tablet  •  atorvastatin (LIPITOR) 40 mg tablet  •  b complex vitamins tablet  •  bisacodyl (DULCOLAX) 5 mg EC tablet  •  ergocalciferol (VITAMIN D2) 38,175 units  •  folic acid (FOLVITE) 1 mg tablet  •  lidocaine-prilocaine (EMLA) cream  •  losartan (COZAAR) 50 mg tablet  •  omeprazole (PriLOSEC) 20 mg delayed release capsule  •  ondansetron (ZOFRAN) 8 mg tablet  •  polyethylene glycol (GOLYTELY) 4000 mL solution  •  prochlorperazine (COMPAZINE) 10 mg tablet  •  traMADol (Ultram) 50 mg tablet  •  vitamin B-12 (VITAMIN B-12) 1,000 mcg tablet    No Known Allergies      PHYSICAL EXAM:    Constitutional: Oriented to person, place, and time. Well-developed and well-nourished, no apparent distress, non-toxic appearance. Cooperative, able to hear and answer questions without difficulty. Voice: Normal voice quality. Head: Normocephalic, atraumatic. No scars, masses or lesions. Face: Symmetric, no edema, no sinus tenderness. Eyes: Vision grossly intact, extra-ocular movement intact. Right Ear: External ear normal.  Auditory canal clear. Tympanic membrane well-appearing, without retraction or scarring. No fluid present. No post-auricular erythema or tenderness  Left Ear: External ear normal.  Auditory canal clear. Tympanic membrane well-appearing, without retraction or scarring. No fluid present. No post-auricular erythema or tenderness. Nose: Septum midline, nares clear. Mucosa moist, turbinates well appearing. No crusting, polyps or discharge evident. Oral cavity: Dentition intact. Mucosa moist, lips normal.  Tongue mobile, floor of mouth normal.  Hard palate unremarkable. No masses or lesions. Hard region on L buccal mucosa, likely due to lymphadenopathy/mass. Oropharynx: Uvula is midline, sot palate normal.  Unremarkable oropharyngeal inlet. Left tonsil larger than right with increased vascularity. .  Posterior pharyngeal wall clear  Salivary glands:  Parotid gland enlargement on left in line with lymphadenopathy. Neck: hard and tender lymphadenopathy on level II. Thyroid: normal in size, unremarkable without tenderness or palpable nodules. Pulmonary/Chest: Normal effort and rate. No respiratory distress. Musculoskeletal: Normal range of motion. Neurological: Cranial nerves 2-12 intact. Skin: Skin is warm and dry. Psychiatric: Normal mood and affect. Imaging Studies: I have personally reviewed images on the PACS system and : PET CT There is right-sided hypermetabolic cervical lymphadenopathy most consistent with cervical claudio metastatic disease with primary malignancy/lymphoproliferative malignancy not excluded. Representative hypermetabolic lymph node is noted in the right level   2 region on image 42 of series 17 measuring approximately 3.3 x 2.4 cm with max SUV of 22.  Additional smaller hypermetabolic right cervical lymph nodes are noted extending posteriorly and inferiorly from this dominant right level 2 cervical lymph node   subjacent to the right sternocleidomastoid muscle. FNA 5/26/23: Suspicious for malignancy. Clusters of atypical epithelioid cells in a background of lymphoid cells, suspicious for carcinoma. See note.

## 2023-07-14 ENCOUNTER — PATIENT OUTREACH (OUTPATIENT)
Dept: HEMATOLOGY ONCOLOGY | Facility: CLINIC | Age: 68
End: 2023-07-14

## 2023-07-14 NOTE — PROGRESS NOTES
Called pt to check in prior to her starting treatment, she is aware of her port placement date, and knows she needs a . She is aware of all her scheduled treatment appointments. We discussed the [address] [place] [address] Support group, and I e mailed her the information we discussed.   I already scheduled her for  [facility] and she is established with them

## 2023-07-17 ENCOUNTER — LAB REQUISITION (OUTPATIENT)
Dept: LAB | Facility: HOSPITAL | Age: 68
End: 2023-07-17

## 2023-07-17 DIAGNOSIS — R94.8 ABNORMAL RESULTS OF FUNCTION STUDIES OF OTHER ORGANS AND SYSTEMS: ICD-10-CM

## 2023-07-19 RX ORDER — CEFAZOLIN SODIUM 2 G/50ML
2000 SOLUTION INTRAVENOUS ONCE
Status: CANCELLED | OUTPATIENT
Start: 2023-07-19 | End: 2023-07-19

## 2023-07-19 RX ORDER — SODIUM CHLORIDE 9 MG/ML
75 INJECTION, SOLUTION INTRAVENOUS CONTINUOUS
Status: CANCELLED | OUTPATIENT
Start: 2023-07-19

## 2023-07-20 ENCOUNTER — ANESTHESIA (OUTPATIENT)
Dept: PERIOP | Facility: HOSPITAL | Age: 68
End: 2023-07-20
Payer: MEDICARE

## 2023-07-20 ENCOUNTER — HOSPITAL ENCOUNTER (OUTPATIENT)
Facility: HOSPITAL | Age: 68
Setting detail: OUTPATIENT SURGERY
Discharge: HOME | End: 2023-07-20
Attending: OTOLARYNGOLOGY | Admitting: OTOLARYNGOLOGY
Payer: MEDICARE

## 2023-07-20 ENCOUNTER — ANESTHESIA EVENT (OUTPATIENT)
Dept: PERIOP | Facility: HOSPITAL | Age: 68
End: 2023-07-20
Payer: MEDICARE

## 2023-07-20 VITALS
SYSTOLIC BLOOD PRESSURE: 138 MMHG | OXYGEN SATURATION: 94 % | RESPIRATION RATE: 12 BRPM | HEART RATE: 79 BPM | TEMPERATURE: 97.8 F | DIASTOLIC BLOOD PRESSURE: 79 MMHG

## 2023-07-20 DIAGNOSIS — J35.8 TONSILLAR MASS: ICD-10-CM

## 2023-07-20 DIAGNOSIS — R59.0 CERVICAL LYMPHADENOPATHY: ICD-10-CM

## 2023-07-20 PROCEDURE — 43200 ESOPHAGOSCOPY FLEXIBLE BRUSH: CPT | Performed by: OTOLARYNGOLOGY

## 2023-07-20 PROCEDURE — 31536 LARYNGOSCOPY W/BX & OP SCOPE: CPT | Performed by: OTOLARYNGOLOGY

## 2023-07-20 PROCEDURE — 88341 IMHCHEM/IMCYTCHM EA ADD ANTB: CPT | Performed by: PATHOLOGY

## 2023-07-20 PROCEDURE — 88342 IMHCHEM/IMCYTCHM 1ST ANTB: CPT | Performed by: PATHOLOGY

## 2023-07-20 PROCEDURE — 31622 DX BRONCHOSCOPE/WASH: CPT | Performed by: OTOLARYNGOLOGY

## 2023-07-20 PROCEDURE — 88304 TISSUE EXAM BY PATHOLOGIST: CPT | Performed by: PATHOLOGY

## 2023-07-20 RX ORDER — MEPERIDINE HYDROCHLORIDE 25 MG/ML
12.5 INJECTION INTRAMUSCULAR; INTRAVENOUS; SUBCUTANEOUS
Status: DISCONTINUED | OUTPATIENT
Start: 2023-07-20 | End: 2023-07-20 | Stop reason: HOSPADM

## 2023-07-20 RX ORDER — FENTANYL CITRATE 50 UG/ML
INJECTION, SOLUTION INTRAMUSCULAR; INTRAVENOUS AS NEEDED
Status: DISCONTINUED | OUTPATIENT
Start: 2023-07-20 | End: 2023-07-20

## 2023-07-20 RX ORDER — DEXAMETHASONE SODIUM PHOSPHATE 10 MG/ML
INJECTION, SOLUTION INTRAMUSCULAR; INTRAVENOUS AS NEEDED
Status: DISCONTINUED | OUTPATIENT
Start: 2023-07-20 | End: 2023-07-20

## 2023-07-20 RX ORDER — OXYCODONE HYDROCHLORIDE 5 MG/1
5 TABLET ORAL EVERY 6 HOURS PRN
Status: DISCONTINUED | OUTPATIENT
Start: 2023-07-20 | End: 2023-07-20 | Stop reason: HOSPADM

## 2023-07-20 RX ORDER — PHENYLEPHRINE HCL IN 0.9% NACL 1 MG/10 ML
SYRINGE (ML) INTRAVENOUS AS NEEDED
Status: DISCONTINUED | OUTPATIENT
Start: 2023-07-20 | End: 2023-07-20

## 2023-07-20 RX ORDER — LIDOCAINE HYDROCHLORIDE 10 MG/ML
INJECTION, SOLUTION EPIDURAL; INFILTRATION; INTRACAUDAL; PERINEURAL AS NEEDED
Status: DISCONTINUED | OUTPATIENT
Start: 2023-07-20 | End: 2023-07-20

## 2023-07-20 RX ORDER — FENTANYL CITRATE/PF 50 MCG/ML
50 SYRINGE (ML) INJECTION
Status: DISCONTINUED | OUTPATIENT
Start: 2023-07-20 | End: 2023-07-20 | Stop reason: HOSPADM

## 2023-07-20 RX ORDER — ONDANSETRON 2 MG/ML
INJECTION INTRAMUSCULAR; INTRAVENOUS AS NEEDED
Status: DISCONTINUED | OUTPATIENT
Start: 2023-07-20 | End: 2023-07-20

## 2023-07-20 RX ORDER — IBUPROFEN 600 MG/1
600 TABLET ORAL EVERY 6 HOURS PRN
Status: DISCONTINUED | OUTPATIENT
Start: 2023-07-20 | End: 2023-07-20 | Stop reason: HOSPADM

## 2023-07-20 RX ORDER — SODIUM CHLORIDE, SODIUM LACTATE, POTASSIUM CHLORIDE, CALCIUM CHLORIDE 600; 310; 30; 20 MG/100ML; MG/100ML; MG/100ML; MG/100ML
INJECTION, SOLUTION INTRAVENOUS CONTINUOUS PRN
Status: DISCONTINUED | OUTPATIENT
Start: 2023-07-20 | End: 2023-07-20

## 2023-07-20 RX ORDER — PROPOFOL 10 MG/ML
INJECTION, EMULSION INTRAVENOUS AS NEEDED
Status: DISCONTINUED | OUTPATIENT
Start: 2023-07-20 | End: 2023-07-20

## 2023-07-20 RX ORDER — ROCURONIUM BROMIDE 10 MG/ML
INJECTION, SOLUTION INTRAVENOUS AS NEEDED
Status: DISCONTINUED | OUTPATIENT
Start: 2023-07-20 | End: 2023-07-20

## 2023-07-20 RX ORDER — MIDAZOLAM HYDROCHLORIDE 2 MG/2ML
INJECTION, SOLUTION INTRAMUSCULAR; INTRAVENOUS AS NEEDED
Status: DISCONTINUED | OUTPATIENT
Start: 2023-07-20 | End: 2023-07-20

## 2023-07-20 RX ORDER — ONDANSETRON 2 MG/ML
4 INJECTION INTRAMUSCULAR; INTRAVENOUS ONCE AS NEEDED
Status: DISCONTINUED | OUTPATIENT
Start: 2023-07-20 | End: 2023-07-20 | Stop reason: HOSPADM

## 2023-07-20 RX ORDER — METOCLOPRAMIDE HYDROCHLORIDE 5 MG/ML
10 INJECTION INTRAMUSCULAR; INTRAVENOUS ONCE AS NEEDED
Status: DISCONTINUED | OUTPATIENT
Start: 2023-07-20 | End: 2023-07-20 | Stop reason: HOSPADM

## 2023-07-20 RX ORDER — ACETAMINOPHEN 325 MG/1
650 TABLET ORAL EVERY 6 HOURS PRN
Status: DISCONTINUED | OUTPATIENT
Start: 2023-07-20 | End: 2023-07-20 | Stop reason: HOSPADM

## 2023-07-20 RX ADMIN — FENTANYL CITRATE 50 MCG: 50 INJECTION INTRAMUSCULAR; INTRAVENOUS at 11:05

## 2023-07-20 RX ADMIN — PROPOFOL 200 MG: 10 INJECTION, EMULSION INTRAVENOUS at 10:58

## 2023-07-20 RX ADMIN — SODIUM CHLORIDE, SODIUM LACTATE, POTASSIUM CHLORIDE, AND CALCIUM CHLORIDE: .6; .31; .03; .02 INJECTION, SOLUTION INTRAVENOUS at 10:38

## 2023-07-20 RX ADMIN — LIDOCAINE HYDROCHLORIDE 50 MG: 10 INJECTION, SOLUTION EPIDURAL; INFILTRATION; INTRACAUDAL; PERINEURAL at 10:58

## 2023-07-20 RX ADMIN — FENTANYL CITRATE 50 MCG: 50 INJECTION INTRAMUSCULAR; INTRAVENOUS at 10:58

## 2023-07-20 RX ADMIN — MIDAZOLAM 1 MG: 1 INJECTION INTRAMUSCULAR; INTRAVENOUS at 10:57

## 2023-07-20 RX ADMIN — Medication 100 MCG: at 11:12

## 2023-07-20 RX ADMIN — ROCURONIUM BROMIDE 50 MG: 10 INJECTION, SOLUTION INTRAVENOUS at 10:58

## 2023-07-20 RX ADMIN — DEXAMETHASONE SODIUM PHOSPHATE 10 MG: 10 INJECTION, SOLUTION INTRAMUSCULAR; INTRAVENOUS at 11:14

## 2023-07-20 RX ADMIN — ONDANSETRON 4 MG: 2 INJECTION INTRAMUSCULAR; INTRAVENOUS at 11:14

## 2023-07-20 RX ADMIN — MIDAZOLAM 1 MG: 1 INJECTION INTRAMUSCULAR; INTRAVENOUS at 10:48

## 2023-07-20 RX ADMIN — SUGAMMADEX 200 MG: 100 INJECTION, SOLUTION INTRAVENOUS at 11:40

## 2023-07-20 NOTE — INTERVAL H&P NOTE
H&P reviewed. After examining the patient I find no changes in the patients condition since the H&P had been written.     Vitals:    07/20/23 0932   BP: (!) 176/82   Pulse: 87   Resp: 18   Temp: (!) 97.3 °F (36.3 °C)   SpO2: 97%     CTAB  RRR  Abd soft NTND

## 2023-07-20 NOTE — ANESTHESIA POSTPROCEDURE EVALUATION
Post-Op Assessment Note    CV Status:  Stable  Pain Score: 0    Pain management: adequate     Mental Status:  Sleepy and awake   Hydration Status:  Stable and euvolemic   PONV Controlled:  None   Airway Patency:  Patent      Post Op Vitals Reviewed: Yes      Staff: CRNA         No notable events documented.     BP  137/73   Temp 97.0   Pulse 78   Resp 18   SpO2 100

## 2023-07-20 NOTE — DISCHARGE INSTR - AVS FIRST PAGE
Direct laryngoscopy, tracheobronchoscopy, esophagoscopy     WHAT YOU SHOULD KNOW:   During a direct laryngoscopy, tracheobronchoscopy, esophagoscopy, your caregiver places a scope into your mouth to see directly inside your throat, airway, and esophagus. You may need them to find injuries, growths, tumors, or other problems in your larynx (voice box) or vocal cords, trachea, or esophagus. Direct laryngoscopy, tracheobronchoscopy, esophagoscopy help your caregiver diagnose your condition and create a treatment plan. AFTER YOU LEAVE:     Voice care: Your voice may sound hoarse afterwards. Try to rest your voice for 1 week. This will help your healing and overall outcomes. Speak softly, but do not whisper. Keep conversations short. Do not shout. You may also experience some sore throat. Follow up with your primary healthcare provider or specialist as directed:  Write down your questions so you remember to ask them during your visits. Medicines:   Keep a current list of your medicines:  Include the amounts, and when, how, and why you take them. Take the list or the pill bottles to follow-up visits. Carry your medicine list with you in case of an emergency. Throw away old medicine lists. Use vitamins, herbs, or food supplements only as directed. Take your medicine as directed:  Call your healthcare provider if you think your medicine is not working as expected. Tell him about any medicine allergies, and if you want to quit taking or change your medicine. Nutrition:  Most people eat and drink as usual after a laryngoscopy. Ask your primary healthcare provider if you are not sure. Contact your primary healthcare provider if:  You have problems breathing or talking. You see new injuries to your teeth, lips, or tongue. Your pain does not go away or gets worse. You have questions about your procedure, medicine, or care.     If you have any questions or concerns during business hours please call our office at [phone].  After hours please call the surgeon on call or Dr. Brian Rouse at [phone]

## 2023-07-20 NOTE — ANESTHESIA PREPROCEDURE EVALUATION
Procedure:  MICRODIRECT LARYNGOSCOPY WITH BIOPSY (Throat)  BRONCHOSCOPY (Bronchus)  ESOPHAGOSCOPY (Throat)    Relevant Problems   CARDIO   (+) Mixed hyperlipidemia   (+) Primary hypertension      GI/HEPATIC   (+) GERD (gastroesophageal reflux disease)      GYN   (+) Malignant neoplasm of right female breast (HCC)      NEURO/PSYCH   (+) Stroke (HCC)      Other   (+) Cervical lymphadenopathy      Obesity, BMI 42    Physical Exam    Airway  Comment: Large thick neck   Mallampati score: III  TM Distance: <3 FB  Neck ROM: full     Dental       Cardiovascular      Pulmonary      Other Findings        Anesthesia Plan  ASA Score- 3     Anesthesia Type- general with ASA Monitors. Additional Monitors:   Airway Plan: ETT. Plan Factors-Exercise tolerance (METS): <4 METS. Chart reviewed. Patient summary reviewed. Patient is not a current smoker. Obstructive sleep apnea risk education given perioperatively. Induction- intravenous. Postoperative Plan- . Planned trial extubation    Informed Consent- Anesthetic plan and risks discussed with patient. I personally reviewed this patient with the CRNA. Discussed and agreed on the Anesthesia Plan with the CRNA. García Bone

## 2023-07-20 NOTE — OP NOTE
OPERATIVE REPORT  PATIENT NAME: Manuela Sexton    :  1955  MRN: [id number]  Pt Location: AN OR ROOM 03    SURGERY DATE: 2023    Surgeon(s) and Role:     * Joselin Cobrin MD - Primary     * Lion Penaloza MD - Assisting     * Cameron Martínez DMD - Observing    Preop Diagnosis:  Tonsillar mass [J35.8]  Cervical lymphadenopathy [R59.0]    Post-Op Diagnosis Codes:     * Tonsillar mass [J35.8]     * Cervical lymphadenopathy [R59.0]    Procedure(s): MICRODIRECT LARYNGOSCOPY WITH BIOPSY  BRONCHOSCOPY  ESOPHAGOSCOPY    Specimen(s):  * No specimens in log *    Estimated Blood Loss:   Minimal    Drains:  * No LDAs found *    Anesthesia Type:   General    Operative Indications: Tonsillar mass [J35.8]  Cervical lymphadenopathy [R59.0]    Operative Findings:  4 cm right level 2a firm fixed in AP dimension   No abnormalities visualized throughout stomach, esophagus, and larynx  Mucus pooling in R main bronchus. No lesions or masses  Hard 2 cm tonsillar mass felt on the R side and was biopsied. No mucosal irregularities. Normal remainder of the upper aerodigestive tract. Complications:   None    Procedure and Technique:  After positive identification, the patient was transferred onto the operating room table in the supine position. Appropriate monitoring devices were put in place, anesthesia was induced, and the patient was intubated without difficulty. The operating room table was turned 90 degrees, and the patient was draped in the usual fashion. Eyes were protected. Before proceeding with surgery, the time-out procedure was completed. The flexible esophagoscope was brought into the field and the orientation was set. The scope was placed into the mouth and guided gently into the upper esophagus. This was easily passed into the esophagus and atraumatically passed into the stomach. The stomach was insufflated and orientation was re-established by manual palpation of the abdomen.  The scope was then slowly removed through the esophagus with full visualization of all walls with the findings above. Microdirect laryngoscopy was performed as follows: The oral cavity and oropharynx were digitally palpated. Hard tonsillar mass felt on the R side. A laryngoscope was carefully introduced and used to examine the patient's oral cavity, oropharynx, hypopharynx and larynx with the above listed findings. Telescope was then used to evaluate the structures closely. Biopsy was performed under direct visualization with 0 degree telescope. Bronchoscopy was performed as follows: ET tube cuff was deflated and the telescope was passed through the cords and passed the larynx to the level of the subsegmental bronchi. First left then right mainstem bronchi and segmental bronchi were evaluated with the above listed findings. The laryngoscope and was removed. No injuries to the upper aerodigestive tract were seen. The patient was then allowed to awaken while monitoring by anesthesia was performed until she was awake and able to maintain her own saturations. The patient was taken to the recovery area in stable condition. All instruments and sponge counts were correct at the end of the procedure. Remy Urbano MD, was present for and performed all key elements of the procedure.     Patient Disposition:  PACU  and extubated and stable        SIGNATURE: Mayi Nation MD  DATE: July 20, 2023  TIME: 11:33 AM

## 2023-07-24 ENCOUNTER — APPOINTMENT (OUTPATIENT)
Dept: LAB | Facility: HOSPITAL | Age: 68
End: 2023-07-24
Payer: MEDICARE

## 2023-07-24 DIAGNOSIS — C78.7 METASTATIC COLON CANCER TO LIVER (HCC): Primary | ICD-10-CM

## 2023-07-24 DIAGNOSIS — C78.7 METASTATIC COLON CANCER TO LIVER (HCC): ICD-10-CM

## 2023-07-24 DIAGNOSIS — C18.9 METASTATIC COLON CANCER TO LIVER (HCC): Primary | ICD-10-CM

## 2023-07-24 DIAGNOSIS — C18.9 METASTATIC COLON CANCER TO LIVER (HCC): ICD-10-CM

## 2023-07-24 LAB
ALBUMIN SERPL BCP-MCNC: 3.6 G/DL (ref 3.5–5)
ALP SERPL-CCNC: 107 U/L (ref 34–104)
ALT SERPL W P-5'-P-CCNC: 35 U/L (ref 7–52)
ANION GAP SERPL CALCULATED.3IONS-SCNC: 9 MMOL/L
AST SERPL W P-5'-P-CCNC: 43 U/L (ref 13–39)
BASOPHILS # BLD AUTO: 0.05 THOUSANDS/ÂΜL (ref 0–0.1)
BASOPHILS NFR BLD AUTO: 0 % (ref 0–1)
BILIRUB SERPL-MCNC: 0.49 MG/DL (ref 0.2–1)
BUN SERPL-MCNC: 13 MG/DL (ref 5–25)
CALCIUM SERPL-MCNC: 9.4 MG/DL (ref 8.4–10.2)
CEA SERPL-MCNC: 58.7 NG/ML (ref 0–3)
CHLORIDE SERPL-SCNC: 102 MMOL/L (ref 96–108)
CO2 SERPL-SCNC: 29 MMOL/L (ref 21–32)
CREAT SERPL-MCNC: 0.75 MG/DL (ref 0.6–1.3)
EOSINOPHIL # BLD AUTO: 0.48 THOUSAND/ÂΜL (ref 0–0.61)
EOSINOPHIL NFR BLD AUTO: 4 % (ref 0–6)
ERYTHROCYTE [DISTWIDTH] IN BLOOD BY AUTOMATED COUNT: 14.3 % (ref 11.6–15.1)
GFR SERPL CREATININE-BSD FRML MDRD: 82 ML/MIN/1.73SQ M
GLUCOSE P FAST SERPL-MCNC: 113 MG/DL (ref 65–99)
HCT VFR BLD AUTO: 32.5 % (ref 34.8–46.1)
HGB BLD-MCNC: 10.4 G/DL (ref 11.5–15.4)
IMM GRANULOCYTES # BLD AUTO: 0.04 THOUSAND/UL (ref 0–0.2)
IMM GRANULOCYTES NFR BLD AUTO: 0 % (ref 0–2)
LYMPHOCYTES # BLD AUTO: 2.83 THOUSANDS/ÂΜL (ref 0.6–4.47)
LYMPHOCYTES NFR BLD AUTO: 22 % (ref 14–44)
MCH RBC QN AUTO: 25.9 PG (ref 26.8–34.3)
MCHC RBC AUTO-ENTMCNC: 32 G/DL (ref 31.4–37.4)
MCV RBC AUTO: 81 FL (ref 82–98)
MONOCYTES # BLD AUTO: 0.64 THOUSAND/ÂΜL (ref 0.17–1.22)
MONOCYTES NFR BLD AUTO: 5 % (ref 4–12)
NEUTROPHILS # BLD AUTO: 8.65 THOUSANDS/ÂΜL (ref 1.85–7.62)
NEUTS SEG NFR BLD AUTO: 69 % (ref 43–75)
NRBC BLD AUTO-RTO: 0 /100 WBCS
PLATELET # BLD AUTO: 423 THOUSANDS/UL (ref 149–390)
PMV BLD AUTO: 10.4 FL (ref 8.9–12.7)
POTASSIUM SERPL-SCNC: 4 MMOL/L (ref 3.5–5.3)
PROT SERPL-MCNC: 7.5 G/DL (ref 6.4–8.4)
RBC # BLD AUTO: 4.02 MILLION/UL (ref 3.81–5.12)
SODIUM SERPL-SCNC: 140 MMOL/L (ref 135–147)
WBC # BLD AUTO: 12.69 THOUSAND/UL (ref 4.31–10.16)

## 2023-07-24 PROCEDURE — 85025 COMPLETE CBC W/AUTO DIFF WBC: CPT

## 2023-07-24 PROCEDURE — 80053 COMPREHEN METABOLIC PANEL: CPT

## 2023-07-24 PROCEDURE — 36415 COLL VENOUS BLD VENIPUNCTURE: CPT

## 2023-07-24 PROCEDURE — 82378 CARCINOEMBRYONIC ANTIGEN: CPT

## 2023-07-26 PROCEDURE — 88341 IMHCHEM/IMCYTCHM EA ADD ANTB: CPT | Performed by: PATHOLOGY

## 2023-07-26 PROCEDURE — 88304 TISSUE EXAM BY PATHOLOGIST: CPT | Performed by: PATHOLOGY

## 2023-07-26 PROCEDURE — 88342 IMHCHEM/IMCYTCHM 1ST ANTB: CPT | Performed by: PATHOLOGY

## 2023-07-27 ENCOUNTER — DOCUMENTATION (OUTPATIENT)
Dept: HEMATOLOGY ONCOLOGY | Facility: CLINIC | Age: 68
End: 2023-07-27

## 2023-07-27 ENCOUNTER — TELEPHONE (OUTPATIENT)
Dept: HEMATOLOGY ONCOLOGY | Facility: CLINIC | Age: 68
End: 2023-07-27

## 2023-07-27 DIAGNOSIS — C09.9 RIGHT TONSILLAR SQUAMOUS CELL CARCINOMA (HCC): Primary | ICD-10-CM

## 2023-07-27 NOTE — PROGRESS NOTES
Chart reviewed in preparation of Multidisciplinary Head and Neck Tumor Conference presentation by Dr. Jeana Rinaldi on 8/9/23. 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 PET/CT on 6/19/23 demonstrated a large right level 2A lymph node measuring 4.7cm with nodular FDG activity along the right tonsil with soft tissue measuring 1.5cm. There are mildly prominent borderline enlarged mildly FDG avid left cervical lymph nodes suspicious for developing left-sided claudio disease. Other findings on her PET scan include a RLL nodule suspicious for hypermetabolic disease, liver metastases, and focal hypermetabolic mural thickening of the proximal colon. Biopsy of the right level IIA node showed suspicions for malignancy, with DL and right tonsillar biopsy on 7/20/2023 showing p16 positive invasive squamous cell carcinoma. Liver biopsy on 6/27/23 showed metastatic adenocarcinoma suggestive of colorectal primary. Colon biopsy showed well-differentiated adenocarcinoma.

## 2023-07-27 NOTE — TELEPHONE ENCOUNTER
I called and left the patient a message to let her know that I have been communicating with Dr. Kristian Cheung about her tonsil biopsy results. 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 I have Rony Pemakelly put in a radiation oncology referral and we will be discussing her case at our next tumor board. She can still go forward with FOLFOX chemotherapy on Monday and we will discuss timing of additional treatment at tumor board. I left her callback number if she has any questions.

## 2023-07-28 ENCOUNTER — PATIENT OUTREACH (OUTPATIENT)
Dept: HEMATOLOGY ONCOLOGY | Facility: CLINIC | Age: 68
End: 2023-07-28

## 2023-07-28 ENCOUNTER — HOSPITAL ENCOUNTER (OUTPATIENT)
Dept: INTERVENTIONAL RADIOLOGY/VASCULAR | Facility: HOSPITAL | Age: 68
Discharge: HOME | End: 2023-07-28
Attending: INTERNAL MEDICINE
Payer: MEDICARE

## 2023-07-28 ENCOUNTER — DOCUMENTATION (OUTPATIENT)
Dept: HEMATOLOGY ONCOLOGY | Facility: CLINIC | Age: 68
End: 2023-07-28

## 2023-07-28 VITALS
HEIGHT: 66 IN | OXYGEN SATURATION: 96 % | DIASTOLIC BLOOD PRESSURE: 79 MMHG | SYSTOLIC BLOOD PRESSURE: 184 MMHG | RESPIRATION RATE: 18 BRPM | TEMPERATURE: 97.6 F | HEART RATE: 88 BPM | BODY MASS INDEX: 42.27 KG/M2 | WEIGHT: 263 LBS

## 2023-07-28 DIAGNOSIS — C18.9 METASTATIC COLON CANCER TO LIVER (HCC): ICD-10-CM

## 2023-07-28 DIAGNOSIS — C78.7 METASTATIC COLON CANCER TO LIVER (HCC): ICD-10-CM

## 2023-07-28 PROCEDURE — 76937 US GUIDE VASCULAR ACCESS: CPT | Performed by: INTERNAL MEDICINE

## 2023-07-28 PROCEDURE — 99152 MOD SED SAME PHYS/QHP 5/>YRS: CPT | Performed by: INTERNAL MEDICINE

## 2023-07-28 PROCEDURE — 77001 FLUOROGUIDE FOR VEIN DEVICE: CPT | Performed by: INTERNAL MEDICINE

## 2023-07-28 PROCEDURE — 36561 INSERT TUNNELED CV CATH: CPT | Performed by: INTERNAL MEDICINE

## 2023-07-28 PROCEDURE — 99153 MOD SED SAME PHYS/QHP EA: CPT

## 2023-07-28 PROCEDURE — 36561 INSERT TUNNELED CV CATH: CPT

## 2023-07-28 PROCEDURE — C1894 INTRO/SHEATH, NON-LASER: HCPCS

## 2023-07-28 PROCEDURE — C1788 PORT, INDWELLING, IMP: HCPCS

## 2023-07-28 PROCEDURE — 99152 MOD SED SAME PHYS/QHP 5/>YRS: CPT

## 2023-07-28 RX ORDER — MIDAZOLAM HYDROCHLORIDE 2 MG/2ML
INJECTION, SOLUTION INTRAMUSCULAR; INTRAVENOUS AS NEEDED
Status: DISCONTINUED | OUTPATIENT
Start: 2023-07-28 | End: 2023-07-29 | Stop reason: HOSPADM

## 2023-07-28 RX ORDER — SODIUM CHLORIDE 9 MG/ML
75 INJECTION, SOLUTION INTRAVENOUS CONTINUOUS
Status: DISCONTINUED | OUTPATIENT
Start: 2023-07-28 | End: 2023-07-29 | Stop reason: HOSPADM

## 2023-07-28 RX ORDER — CEFAZOLIN SODIUM 2 G/50ML
2000 SOLUTION INTRAVENOUS ONCE
Status: COMPLETED | OUTPATIENT
Start: 2023-07-28 | End: 2023-07-28

## 2023-07-28 RX ORDER — FENTANYL CITRATE 50 UG/ML
INJECTION, SOLUTION INTRAMUSCULAR; INTRAVENOUS AS NEEDED
Status: DISCONTINUED | OUTPATIENT
Start: 2023-07-28 | End: 2023-07-29 | Stop reason: HOSPADM

## 2023-07-28 RX ADMIN — MIDAZOLAM 0.5 MG: 1 INJECTION INTRAMUSCULAR; INTRAVENOUS at 14:16

## 2023-07-28 RX ADMIN — FENTANYL CITRATE 25 MCG: 50 INJECTION, SOLUTION INTRAMUSCULAR; INTRAVENOUS at 14:28

## 2023-07-28 RX ADMIN — MIDAZOLAM 0.5 MG: 1 INJECTION INTRAMUSCULAR; INTRAVENOUS at 14:13

## 2023-07-28 RX ADMIN — SODIUM CHLORIDE 75 ML/HR: 0.9 INJECTION, SOLUTION INTRAVENOUS at 13:40

## 2023-07-28 RX ADMIN — CEFAZOLIN SODIUM 2000 MG: 2 SOLUTION INTRAVENOUS at 13:48

## 2023-07-28 RX ADMIN — MIDAZOLAM 0.5 MG: 1 INJECTION INTRAMUSCULAR; INTRAVENOUS at 14:28

## 2023-07-28 RX ADMIN — FENTANYL CITRATE 25 MCG: 50 INJECTION, SOLUTION INTRAMUSCULAR; INTRAVENOUS at 14:13

## 2023-07-28 RX ADMIN — MIDAZOLAM 0.5 MG: 1 INJECTION INTRAMUSCULAR; INTRAVENOUS at 14:20

## 2023-07-28 RX ADMIN — FENTANYL CITRATE 25 MCG: 50 INJECTION, SOLUTION INTRAMUSCULAR; INTRAVENOUS at 14:20

## 2023-07-28 RX ADMIN — FENTANYL CITRATE 25 MCG: 50 INJECTION, SOLUTION INTRAMUSCULAR; INTRAVENOUS at 14:16

## 2023-07-28 NOTE — PROGRESS NOTES
NN initial phone outreach to the pt, daughter answered the phone, I asked about speaking to the pt, daughter stated that the pt is busy at the moment, I asked if she could have the pt call me when she had a minute to speak to me. Daughter took down my phone number and said she would have her call me when she was avail. I thanked her.

## 2023-07-28 NOTE — H&P
Interventional Radiology  History and Physical 7/28/2023     Jojo Marti   1955   [id number]    Assessment/Plan:    80year old female with history metastatic colon cancer, plan for chemotherapy. Plan for port placement. Procedure, risks, benefits and alternatives were discussed with the patient. Consent obtained at bedside. /87   Pulse 80   Temp (!) 97.4 °F (36.3 °C) (Temporal)   Resp 20   Ht 5' 6" (1.676 m)   Wt 119 kg (263 lb)   SpO2 97%   BMI 42.45 kg/m²       Problem List Items Addressed This Visit        Digestive    Metastatic colon cancer to liver [facility])    Relevant Orders    IR port placement          Subjective: Normal state of health, no acute complaints. Patient ID: Jojo Marti is a 80 y.o. female. History of Present Illness  See A/P above. Review of Systems   Respiratory: Negative. Cardiovascular: Negative.           Past Medical History:   Diagnosis Date   • Anemia    • Arthritis    • Breast cancer ([address]) 12/17/2018   • Cancer ([address])     right breast   • GERD (gastroesophageal reflux disease)    • Hyperlipidemia    • Hypertension    • Obesity    • Stroke [facility])     TIA    • Transaminitis 9/22/2021        Past Surgical History:   Procedure Laterality Date   • BREAST BIOPSY Right 11/23/2018    us guided bx cancer   • BREAST SURGERY     • ESOPHAGOSCOPY N/A 7/20/2023    Procedure: ESOPHAGOSCOPY;  Surgeon: Ros Strange MD;  [facility];  Service: ENT   • IR BIOPSY LIVER MASS  6/27/2023   •  [address] INCL FLUOR GDNCE DX W/CELL WASHG [facility] N/A 7/20/2023    Procedure: BRONCHOSCOPY;  Surgeon: Ros Strange MD;  [facility];  Service: ENT   • NC LARYNGOSCOPY W/BIOPSY [address] N/A 7/20/2023    Procedure: MICRODIRECT LARYNGOSCOPY WITH BIOPSY;  Surgeon: Ros Strange MD;  [facility];  Service: ENT   • NC MASTECTOMY PARTIAL W/AXILLARY LYMPHADENECTOMY Right 12/20/2018    Procedure: ULTRASOUND LOCALIZED PARTIAL MASTECTOMY W/SENTINEL NODE BIOPSY POSS AXILLARY DISSECTION;  Surgeon: Domi Booker MD;  Location: [address] [place] [address] OR;  Service: General   • TUBAL LIGATION     • US GUIDED BREAST BIOPSY RIGHT COMPLETE Right 11/23/2018   • US GUIDED INJECTION FOR RESEARCH STUDY  12/20/2018   • US GUIDED INJECTION FOR RESEARCH STUDY  12/7/2018   • US GUIDED INJECTION FOR RESEARCH STUDY  5/3/2019   • US GUIDED LYMPH NODE BIOPSY RIGHT  5/26/2023        Social History     Tobacco Use   Smoking Status Never   Smokeless Tobacco Never   Tobacco Comments    NO TOBACCO USE        Social History     Substance and Sexual Activity   Alcohol Use No        Social History     Substance and Sexual Activity   Drug Use No        No Known Allergies    Current Outpatient Medications   Medication Sig Dispense Refill   • anastrozole (ARIMIDEX) 1 mg tablet Take 1 tablet (1 mg total) by mouth daily 90 tablet 3   • atorvastatin (LIPITOR) 40 mg tablet Take 1 tablet (40 mg total) by mouth daily at bedtime 30 tablet 2   • losartan (COZAAR) 50 mg tablet Take 1 tablet (50 mg total) by mouth daily 90 tablet 1   • [START ON 7/31/2023] fluorouracil 5,375 mg in CADD/Elastomeric Infusion Device Infuse 5,375 mg (1,200 mg/m2/day x 2.24 m2) into a catheter in a vein via infusion device over 46 hours for 2 days  Infusion planned for July 31, 2023. 1 each 0   • lidocaine-prilocaine (EMLA) cream Apply topically as needed for mild pain 30 g 3   • omeprazole (PriLOSEC) 20 mg delayed release capsule Take 1 capsule (20 mg total) by mouth daily 30 capsule 5   • ondansetron (ZOFRAN) 8 mg tablet Take 1 tablet (8 mg total) by mouth every 8 (eight) hours as needed for nausea or vomiting 30 tablet 3   • polyethylene glycol (GOLYTELY) 4000 mL solution Take 4,000 mL by mouth once for 1 dose 4000 mL 0   • prochlorperazine (COMPAZINE) 10 mg tablet Take 1 tablet (10 mg total) by mouth every 6 (six) hours as needed for nausea or vomiting 30 tablet 3     Current Facility-Administered Medications   Medication Dose Route Frequency Provider Last Rate Last Admin   • ceFAZolin (ANCEF) IVPB (premix in dextrose) 2,000 mg 50 mL  2,000 mg Intravenous Once Gio Tamez  mL/hr at 07/28/23 1348 2,000 mg at 07/28/23 1348   • sodium chloride 0.9 % infusion  75 mL/hr Intravenous Continuous Gio Tamez MD 75 mL/hr at 07/28/23 1340 75 mL/hr at 07/28/23 1340          Objective:    Vitals:    07/28/23 1334 07/28/23 1337   BP: 142/87    Pulse: 80    Resp: 20    Temp: (!) 97.4 °F (36.3 °C)    TempSrc: Temporal    SpO2: 97%    Weight:  119 kg (263 lb)   Height:  5' 6" (1.676 m)        Physical Exam  Pulmonary:      Effort: Pulmonary effort is normal.           No results found for: "BNP"   Lab Results   Component Value Date    WBC 12.69 (H) 07/24/2023    HGB 10.4 (L) 07/24/2023    HCT 32.5 (L) 07/24/2023    MCV 81 (L) 07/24/2023     (H) 07/24/2023     Lab Results   Component Value Date    INR 1.08 06/27/2023    PROTIME 14.7 (H) 06/27/2023     No results found for: "PTT"      I have personally reviewed pertinent imaging and laboratory results. Code Status: No Order  Advance Directive and Living Will:      Power of :    POLST:      This text is generated with voice recognition software. There may be translation, syntax,  or grammatical errors. If you have any questions, please contact the dictating provider.

## 2023-07-28 NOTE — PROGRESS NOTES
In-basket message received from Dr. Gee Cardenas to add patient to head and neck [address] [place] [address] Loop on 8/9/2023. Chart reviewed and prep completed.

## 2023-07-28 NOTE — BRIEF OP NOTE (RAD/CATH)
INTERVENTIONAL RADIOLOGY PROCEDURE NOTE    Date: 7/28/2023    Procedure:   Procedure Summary     Date: 07/28/23 Room / Location: [facility] Interventional Radiology    Anesthesia Start:  Anesthesia Stop:     Procedure: IR PORT PLACEMENT Diagnosis:       Metastatic colon cancer to [facility])      (Metastatic colon cancer to [facility]))    Scheduled Providers:  Responsible Provider:     Anesthesia Type: Not recorded ASA Status: Not recorded          Preoperative diagnosis:   1. Metastatic colon cancer to [facility])         Postoperative diagnosis: Same. Surgeon: Braxton Chung MD     Assistant: None. No qualified resident was available. Blood loss: Minimal    Specimens: None     Findings:     Right IJ port placement. Dignity midsize. Tip in inferior right atrium. Line functions properly. Complications: None immediate.     Anesthesia: conscious sedation

## 2023-07-28 NOTE — PROGRESS NOTES
Received In-Basket request from Dr. 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 Rad/Onc Consult 8/01/23  Palliative Care Consult 8/03/23  Presentation at [facility] multidisciplinary TB 8/08/23  MBS w/ speech 8/15/23    I spoke with Maira and provided her new appnt info. Updated schedule communicated with [facility] via e-mail. She was appreciative.

## 2023-07-28 NOTE — DISCHARGE INSTRUCTIONS
Implanted Venous Access Port     WHAT YOU NEED TO KNOW:   An implanted venous access port is a device used to give treatments and take blood. It may also be called a central venous access device (CVAD). The port is a small container that is placed under your skin, usually in your upper chest. The port is attached to a catheter that enters a large vein. DISCHARGE INSTRUCTIONS:   Resume your normal diet. Small sips of flat soda will help with mild nausea. Prevent an infection:   Wash your hands often. Use soap and water. Clean your hands before and after you care for your port. Remind everyone who cares for your port to wash their hands. Check your skin for infection every day. Look for redness, swelling, or fluid oozing from the port site. Care for your port:   1. You may shower beginning 48 hours after procedure. 2. Change dressing if it becomes wet. 3. Remove dressing after 24 hours. Leave glue in place. 4. It is normal for some bruising to occur. 5. Use Tylenol for pain. 6. Limit use of arm on the side that your port was placed. Lift nothing heavier than 5 pounds for 1 week, and then gradually increase activity as tolerated. 7. DO NOT apply ointment, lotion or cream to port site until incision is healed. Allow glue to fall off. DO NOT attempt to peel glue from skin even it it begins to flake. 8, After the port incision is healed you may swim, bathe. Notify the Interventional Radiologist if you have any of the followin. Fever above 101 F    2. Increased redness or swelling after 1st day. 3. Increased pain after 1st day. 4. Any sign of infection (drainage from port site, skin separation, hot to touch). 5. Persistent nausea or vomiting. Contact Interventional Radiology at [phone] [facility] PATIENTS: Contact Interventional Radiology at [phone]) ([address] [facility] 281: Contact Interventional Radiology at [phone]).         Moderate Sedation   WHAT YOU NEED TO KNOW:   Moderate sedation, or conscious sedation, is medicine used during procedures to help you feel relaxed and calm. You will be awake and able to follow directions without anxiety or pain. You will remember little to none of the procedure. You may feel tired, weak, or unsteady on your feet after you get sedation. You may also have trouble concentrating or short-term memory loss. These symptoms should go away in 24 hours or less. DISCHARGE INSTRUCTIONS:   Call 911 or have someone else call for any of the following: You have sudden trouble breathing. You cannot be woken. Seek care immediately if:   You have a severe headache or dizziness. Your heart is beating faster than usual.  Contact your healthcare provider if:   You have a fever. You have nausea or are vomiting for more than 8 hours after the procedure. Your skin is itchy, swollen, or you have a rash. You have questions or concerns about your condition or care. Self-care:   Have someone stay with you for 24 hours. This person can drive you to errands and help you do things around the house. This person can also watch for problems. Rest and do quiet activities for 24 hours. Do not exercise, ride a bike, or play sports. Stand up slowly to prevent dizziness and falls. Take short walks around the house with another person. Slowly return to your usual activities the next day. Do not drive or use dangerous machines or tools for 24 hours. You may injure yourself or others. Examples include a lawnmower, saw, or drill. Do not return to work for 24 hours if you use dangerous machines or tools for work. Do not make important decisions for 24 hours. For example, do not sign important papers or invest money. Drink liquids as directed. Liquids help flush the sedation medicine out of your body. Ask how much liquid to drink each day and which liquids are best for you.       Eat small, frequent meals to prevent nausea and vomiting. Start with clear liquids such as juice or broth. If you do not vomit after clear liquids, you can eat your usual foods. Do not drink alcohol or take medicines that make you drowsy. This includes medicines that help you sleep and anxiety medicines. Ask your healthcare provider if it is safe for you to take pain medicine. Follow up with your healthcare provider as directed: Write down your questions so you remember to ask them during your visits. © 2017 5 [facility] Information is for End User's use only and may not be sold, redistributed or otherwise used for commercial purposes. All illustrations and images included in CareNotes® are the copyrighted property of [employer] or [employer]. The above information is an  only. It is not intended as medical advice for individual conditions or treatments. Talk to your doctor, nurse or pharmacist before following any medical regimen to see if it is safe and effective for you.

## 2023-07-31 ENCOUNTER — TELEPHONE (OUTPATIENT)
Dept: GENETICS | Facility: CLINIC | Age: 68
End: 2023-07-31

## 2023-07-31 ENCOUNTER — HOSPITAL ENCOUNTER (OUTPATIENT)
Dept: INFUSION CENTER | Facility: CLINIC | Age: 68
Discharge: HOME | End: 2023-07-31
Payer: MEDICARE

## 2023-07-31 VITALS
HEART RATE: 72 BPM | TEMPERATURE: 97.6 F | RESPIRATION RATE: 18 BRPM | WEIGHT: 264.55 LBS | DIASTOLIC BLOOD PRESSURE: 83 MMHG | BODY MASS INDEX: 42.52 KG/M2 | SYSTOLIC BLOOD PRESSURE: 138 MMHG | HEIGHT: 66 IN

## 2023-07-31 DIAGNOSIS — C78.7 METASTATIC COLON CANCER TO LIVER (HCC): Primary | ICD-10-CM

## 2023-07-31 DIAGNOSIS — C18.9 METASTATIC COLON CANCER TO LIVER (HCC): Primary | ICD-10-CM

## 2023-07-31 PROCEDURE — 96411 CHEMO IV PUSH ADDL DRUG: CPT

## 2023-07-31 PROCEDURE — G0498 CHEMO EXTEND IV INFUS W/PUMP: HCPCS

## 2023-07-31 PROCEDURE — 96413 CHEMO IV INFUSION 1 HR: CPT

## 2023-07-31 PROCEDURE — 96367 TX/PROPH/DG ADDL SEQ IV INF: CPT

## 2023-07-31 PROCEDURE — 96415 CHEMO IV INFUSION ADDL HR: CPT

## 2023-07-31 PROCEDURE — 96368 THER/DIAG CONCURRENT INF: CPT

## 2023-07-31 RX ORDER — FLUOROURACIL 50 MG/ML
900 INJECTION, SOLUTION INTRAVENOUS ONCE
Status: COMPLETED | OUTPATIENT
Start: 2023-07-31 | End: 2023-07-31

## 2023-07-31 RX ORDER — SODIUM CHLORIDE 9 MG/ML
20 INJECTION, SOLUTION INTRAVENOUS ONCE AS NEEDED
Status: DISCONTINUED | OUTPATIENT
Start: 2023-07-31 | End: 2023-08-03 | Stop reason: HOSPADM

## 2023-07-31 RX ORDER — DEXTROSE MONOHYDRATE 50 MG/ML
20 INJECTION, SOLUTION INTRAVENOUS ONCE
Status: COMPLETED | OUTPATIENT
Start: 2023-07-31 | End: 2023-07-31

## 2023-07-31 RX ADMIN — DEXTROSE 20 ML/HR: 5 SOLUTION INTRAVENOUS at 09:29

## 2023-07-31 RX ADMIN — OXALIPLATIN 190.4 MG: 5 INJECTION, SOLUTION INTRAVENOUS at 10:14

## 2023-07-31 RX ADMIN — DEXAMETHASONE SODIUM PHOSPHATE: 10 INJECTION, SOLUTION INTRAMUSCULAR; INTRAVENOUS at 09:29

## 2023-07-31 RX ADMIN — LEUCOVORIN CALCIUM 900 MG: 500 INJECTION, POWDER, LYOPHILIZED, FOR SOLUTION INTRAMUSCULAR; INTRAVENOUS at 10:12

## 2023-07-31 RX ADMIN — FLUOROURACIL 900 MG: 50 INJECTION, SOLUTION INTRAVENOUS at 12:26

## 2023-07-31 NOTE — PROGRESS NOTES
Patient arrived to unit without complaint. Patient tolerated chemotherapy without incident. 5FU BRANDIE connected and independently verified clamp open by Yoana Rios RN. Patient aware to return in 46 hours for disconnect. AVS declined and patient left in stable condition.

## 2023-08-01 ENCOUNTER — RADIATION ONCOLOGY CONSULT (OUTPATIENT)
Dept: RADIATION ONCOLOGY | Facility: HOSPITAL | Age: 68
End: 2023-08-01
Attending: INTERNAL MEDICINE
Payer: MEDICARE

## 2023-08-01 ENCOUNTER — TELEPHONE (OUTPATIENT)
Dept: NUTRITION | Facility: CLINIC | Age: 68
End: 2023-08-01

## 2023-08-01 VITALS
DIASTOLIC BLOOD PRESSURE: 80 MMHG | TEMPERATURE: 97.7 F | WEIGHT: 267 LBS | RESPIRATION RATE: 18 BRPM | BODY MASS INDEX: 43.43 KG/M2 | HEART RATE: 93 BPM | OXYGEN SATURATION: 98 % | SYSTOLIC BLOOD PRESSURE: 132 MMHG

## 2023-08-01 DIAGNOSIS — C78.7 METASTATIC COLON CANCER TO LIVER (HCC): Primary | ICD-10-CM

## 2023-08-01 DIAGNOSIS — C18.9 METASTATIC COLON CANCER TO LIVER (HCC): Primary | ICD-10-CM

## 2023-08-01 DIAGNOSIS — C09.9 RIGHT TONSILLAR SQUAMOUS CELL CARCINOMA (HCC): Primary | ICD-10-CM

## 2023-08-01 PROCEDURE — 99211 OFF/OP EST MAY X REQ PHY/QHP: CPT | Performed by: INTERNAL MEDICINE

## 2023-08-01 PROCEDURE — 99215 OFFICE O/P EST HI 40 MIN: CPT | Performed by: INTERNAL MEDICINE

## 2023-08-01 NOTE — PROGRESS NOTES
Consultation - Radiation Oncology      Patient Name: Barbara Tersea DTK:[id number] : 1955  Encounter: [id number]  Referring Provider: Lender Leyden*    ASSESSMENT  Cancer Staging   Metastatic colon cancer to liver [facility])  Staging form: Colon and Rectum, AJCC 8th Edition  - Clinical stage from 2023: Stage Unknown (cTX, cNX, pM1) - Signed by Mary Lou Shaffer DO on 2023    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[address])  Staging form: Pharynx - Oropharynx, AJCC 8th Edition  - Clinical stage from 2023: Stage III (cT1, cN3, cM0, p16+) - Signed by Lender Leyden, MD on 2023  Stage prefix: Initial diagnosis    PLAN  Barbara Teresa is a 80 y.o. female with a history of prior breast cancer s/p BCS and whole breast RT completing in 2019, with recent diagnosis of synchronous metastatic colon cancer (M1) as well as right tonsillar cancer ([id number]). Imaging including CT neck on 3/30/23 and PET/CT on 23 show a large right level 2A lymph node measuring 4.7cm with nodular FDG activity along the right tonsil with soft tissue measuring 1.5cm. There are mildly prominent borderline enlarged mildly FDG avid left cervical lymph nodes suspicious for developing left-sided claudio disease. Other findings on her PET scan include a right lower lobe nodule suspicious but not definitive for hypermetabolic disease, liver metastases, and focal hypermetabolic mural thickening of the proximal colon. Biopsy of the right level IIA node showed suspicions for malignancy, with DL and right tonsillar biopsy on 2023 showing p16 positive invasive squamous cell carcinoma. Liver biopsy on 23 showed metastatic adenocarcinoma suggestive of colorectal primary. Colon biopsy showed well-differentiated adenocarcinoma. She has established care with medical oncology and ENT, and discussed various treatment approaches.   Her case will be reviewed at multidisciplinary tumor conference given two active malignancies at this time. She has initiated systemic therapy with FOLFOX for metastatic colon cancer given extensive liver burden. I would tend to agree that her metastatic malignancy takes first priority, with the knowledge that she will also need treatment for the head and neck cancer in the near future. One approach as suggested by medical oncology is to initiate a few cycles of FOLFOX, re-evaluate and then proceed with treatment for the [address]. If group consensus after tumor board discussion is for definitive chemoradiation therapy, she would require 70Gy in 35fx with IMRT dosepainting to the bilateral necks. Risks, benefits and side effects were discussed in extensive detail and the patient's questions were answered. Acute and late side effects discussed include, but are not limited to, mucositis, dermatitis, pain, xerostomia, dysgeusia, thick mucus, fatigue, hair loss, weight loss, short term or long term dysphagia ± feeding tube requirement, trismus, fibrosis of the neck muscles, hypothyroidism, xerostomia, nerve injury, risk for osteoradionecrosis, skin atrophy, lymphedema or swelling in the head and neck, and vascular cartilage injury. We reviewed that even with appropriate therapy there is still a risk of recurrence. Manuela Sexton demonstrated a good understanding of our discussion and all questions were answered to the patient's apparent satisfaction. The patient was agreeable to proceed with radiation therapy, and informed consent was signed.     -When appropriate after first phase of therapy for colon cancer, CT simulation can be at [facility], with treatments at [facility]. -IV Contrast form signed  -Dental evaluation prior to RT requested, patient will be scheduled with [address] on 8/8/23  -Speech & Swallow Referral discussed, may have to wait while plan is formalized  -Nutrition Referral would be helpful.     Thank you for the opportunity to participate in the care of this patient. Yaakov Ramsay MD  Department of [facility] [facility]    Orders Placed This Encounter   Procedures   • Radiation Simulation Treatment     1. Right tonsillar squamous cell carcinoma [facility])  Ambulatory Referral to Radiation Oncology    Radiation Simulation Treatment        Total Time Spent  60 minutes spent reviewing EMR in preparation for visit, with the patient, coordination with other providers, review of radiology, and documentation. CHIEF COMPLAINT  Chief Complaint   Patient presents with   • Consult     Radiation Oncology        History of Present Illness  Alma Delia Goyal 1955 is a 80 y.o. female presents for initial consult, referred by Dr. Lorraine Veras for right tonsillar squamous cell carcinoma. History of T2 N0 (stage IIA) invasive ductal carcinoma of the right breast status post breast conservation surgery and whole breast radiation, completed on 4/3/19.       Patient with metastatic adenocarcinoma of colorectal origin w/ mets in chest, abdomen, pelvis, lever, ascending colon. Left neck swelling since February. CT of the neck 3/30/23 showed lymphadenopathy and a thyroid lesion. FNA 5/26/23 of lymphadenopathy in the neck showed carcinoma, not further classified. PET CT 6/19/23 showed diffuse metastatic disease in the lymph nodes of the neck as well as the liver. Biopsy 6/23/23 of the liver lesion that showed adenocarcinoma with a possible colon primary. Recent DL in OR w/ R tonsillar biopsy 7/20/23 showed p16+ SCC. Seen by Dr. Jose Alejandro Banks, plan to initiate FOLFOX.      3/30/23 - CT soft tissue of neck  IMPRESSION:  Large right level 2A lymphadenopathy as described above and suspicious for neoplasm.  Correlation with histopathology is recommended. Mild asymmetric prominence of the right palatine tonsil with otherwise no definitive nodular enhancing lesions identified along the course of the aerodigestive tract. Heterogeneous right thyroid lobe nodule.  Ultrasound is recommended for further evaluation.        5/26/23 - Biopsy:  Final Diagnosis:  A. & B.  Lymph Node, Right Level II A: (Thin-Prep and smears)  Suspicious for malignancy. Clusters of atypical epithelioid cells in a background of lymphoid cells, suspicious for carcinoma. See note.        6/19/23 - NM PET CT  IMPRESSION:  1. Multifocal hypermetabolic malignancy/metastatic disease involving the neck, abdomen, and likely chest.  Specifically in the neck, there is hypermetabolic cervical lymphadenopathy as well as hypermetabolic asymmetric soft tissue prominence to the right tonsillar region for which correlation with direct visualization and possibly tissue sampling is recommended to exclude the possibility of underlying right tonsillar malignancy. Additional nonspecific hypermetabolic soft tissue is noted in the left tonsillar region extending to the base of the tongue/vallecular regions. Patient's known right thyroid lobe nodule is non-FDG avid and remains of uncertain clinical significance.     In the chest, there is a 0.8 cm minimally to mildly FDG avid right lower lobe nodule suspicious for malignancy/metastatic disease.     In the abdomen, there is multifocal hypermetabolic hepatic malignancy/metastatic disease. Additionally, there is focal hypermetabolic concentric mural thickening of the proximal to mid ascending colon suspicious for colonic malignancy which can be further characterized with direct visualization/colonoscopy.     2. Indeterminant 7.8 cm minimally to mildly FDG avid lobulated right pelvic mass in the right adnexal region which is poorly characterized on low-dose unenhanced CT and could reflect exophytic uterine fibroid or right adnexal/ovarian mass. Further evaluation with pelvic ultrasound (MRI if needed) is recommended for further characterization as clinically indicated.        6/27/23 - Biopsy:  Final Diagnosis:  A.  Liver, Biopsy:  - Metastatic adenocarcinoma, suggestive of colorectal primary.  - Background hepatic steatosis.       23 - MRI abdomen and pelvis cancer  IMPRESSION:  Multiple hepatic metastases. Nonocclusive bland thrombus in the anterior right portal vein.     Partially visualized circumferential mural thickening and enhancement of the ascending colon, likely corresponding to the patient's known colonic malignancy.     Thickened endometrium with a 5 mm enhancing endometrial lesion. Differential diagnosis includes endometrial polyp and/or endometrial carcinoma. Recommend further evaluation with pelvic ultrasound.     Right adnexal mass abutting the uterus, likely representing a pedunculated uterine fibroid.        23 - Hematology Oncology - Dr. Rhonda Armstrong  History of early-stage breast cancer on anastrozole now with a new diagnosis of metastatic colon cancer  Need to evaluate her upper airway and tonsil because there could be a concern for second primary cancer, especially with the amount of lymphadenopathy in her neck. Discussed metastatic colon cancer.     Ordered port placement and we discussed starting FOLFOX chemotherapy.         23 - Surgery - Dr. Venancio Camara laryngoscopy with biopsy  Bronchoscopy  Esophagoscopy  Final Diagnosis:  A. Tonsil, right, biopsy:  -Invasive squamous cell carcinoma, p16 positive.  -Immunohistochemical stains performed with appropriate controls show the tumor to be positive for CK5/6, p40, and p16 and negative  for ER, GATA3, and mammaglobin, supporting the diagnosis.       23 - ENT- Dr. Sariah Arnett in OR w/ R tonsillar biopsy showed p16+ SCC. Pt will need appt w/ SLP, med/rad onc, and palliative for coordinative care moving forward. Will continue discussion w/ cancer care team to decide on optimal tx course, including CRT.         Upcomin/3/23 - Palliative  23 - Hematology Oncology- Dr. Rhonda Armstrong  23 - Surgical Oncology - Dr. Anaya Lynn  10/24/23 - ENT - Dr. Kristian Cheung    Today, the patient presents with her daughter. She notes fatigue, abdominal cramping, urinary frequency, and dizziness. She does not have sore throat or trouble swallowing at this time. She does note palpable mass in the right upper neck. She is not currently working at this time. Oncology History   Malignant neoplasm of right female breast ([address])   11/23/2018 Initial Diagnosis    Malignant neoplasm of right female breast ([address])     11/23/2018 Biopsy    Rt Breast US BX 1100 8cmfn, 4 passes 12g Marquee:  - Invasive breast carcinoma of no special type (ductal NST/invasive ductal carcinoma).   - grade 2  - %  - AL 95%  - HER2 negative     12/20/2018 Surgery    Right partial mastectomy and SLN biopsy:  Infiltrating ductal carcinoma, Grade 2/3, felt to be between 2.0 cm and 2.5 cm in greatest dimension  - No invasive tumor is seen at inked margins, but there is a focus of DCIS seen within approximately 1mm of the inked medial margin  - no LVI  - no LCIS  - 0/2 LN's  at least Stage IIA - pT2, pN0, cM0, G2.    - Dr Arnel Owen     12/20/2018 -  Cancer Staged    Stage IIA - pT2, pN0, cM0, G2.       1/4/2019 Genomic Testing    Oncotype DX score: 13     2/1/2019 -  Hormone Therapy    anastrozole 1 mg daily as adjuvant endocrine therapy    - Dr Wai Key       2/14/2019 - 4/3/2019 Radiation    Course: C1    Plan ID Energy Fractions Dose per Fraction (cGy) Dose Correction (cGy) Total Dose Delivered (cGy) Elapsed Days   R Breast 10X/6X 25 / 25 200 0 5,000 40   R BRST BOOST 16E 5 / 5 200 0 1,000 7      Treatment dates:  C1: 2/14/2019 - 4/3/2019       Metastatic colon cancer to liver ([address])   7/11/2023 Initial Diagnosis    Metastatic colon cancer to liver ([address])     7/13/2023 -  Cancer Staged    Staging form: Colon and Rectum, AJCC 8th Edition  - Clinical stage from 7/13/2023: Stage Unknown (cTX, cNX, pM1) - Signed by Mariola Woods DO on 7/13/2023 7/31/2023 -  Chemotherapy    alteplase (CATHFLO), 2 mg, Intracatheter, Every 1 Minute as needed, 1 of 12 cycles  fluorouracil (ADRUCIL), 895 mg, Intravenous, Once, 1 of 12 cycles  Administration: 900 mg (7/31/2023)  leucovorin calcium IVPB, 896 mg, Intravenous, Once, 1 of 12 cycles  Administration: 900 mg (7/31/2023)  oxaliplatin (ELOXATIN) chemo infusion, 85 mg/m2 = 190.4 mg, Intravenous, Once, 1 of 12 cycles  Administration: 190.4 mg (7/31/2023)  fluorouracil (ADRUCIL) ambulatory infusion Soln, 1,200 mg/m2/day = 5,375 mg, Intravenous, Over 46 hours, 1 of 12 cycles     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HCC)   7/27/2023 Initial Diagnosis    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[address])     7/27/2023 -  Cancer Staged    Staging form: Pharynx - Oropharynx, AJCC 8th Edition  - Clinical stage from 7/27/2023: Stage III (cT1, cN3, cM0, p16+) - Signed by Marya Gonsalez MD on 7/27/2023  Stage prefix: Initial diagnosis         Historical Information   Past Medical History:   Diagnosis Date   • Anemia    • Arthritis    • Breast cancer ([address]) 12/17/2018   • Cancer ([address])     right breast   • GERD (gastroesophageal reflux disease)    • Hyperlipidemia    • Hypertension    • Obesity    • Stroke [facility])     TIA    • Transaminitis 9/22/2021     Past Surgical History:   Procedure Laterality Date   • BREAST BIOPSY Right 11/23/2018    us guided bx cancer   • BREAST SURGERY     • ESOPHAGOSCOPY N/A 7/20/2023    Procedure: ESOPHAGOSCOPY;  Surgeon: Gerald Pacheco MD;  Location: [facility];  Service: ENT   • IR BIOPSY LIVER MASS  6/27/2023   • IR PORT PLACEMENT  7/28/2023   •  [address] INCL FLUOR GDNCE DX W/CELL WASHG [facility] N/A 7/20/2023    Procedure: BRONCHOSCOPY;  Surgeon: Gerald Pacheco MD;  Location: [facility];  Service: ENT   • TN LARYNGOSCOPY W/BIOPSY MICROSCOPE/TELESCOPE N/A 7/20/2023    Procedure: Doylene Dills LARYNGOSCOPY WITH BIOPSY;  Surgeon: Gerald Pacheco MD;  Location: [facility];  Service: ENT   • TN MASTECTOMY PARTIAL W/AXILLARY LYMPHADENECTOMY Right 12/20/2018    Procedure: ULTRASOUND LOCALIZED PARTIAL MASTECTOMY W/SENTINEL NODE BIOPSY POSS AXILLARY DISSECTION;  Surgeon: Tommie Rdz MD;  Location: [facility];  Service: General   • TUBAL LIGATION     • US GUIDED BREAST BIOPSY RIGHT COMPLETE Right 11/23/2018   • US GUIDED INJECTION FOR RESEARCH STUDY  12/20/2018   • US GUIDED INJECTION FOR RESEARCH STUDY  12/7/2018   • US GUIDED INJECTION FOR RESEARCH STUDY  5/3/2019   • US GUIDED LYMPH NODE BIOPSY RIGHT  5/26/2023     Family History   Problem Relation Age of Onset   • Colon cancer Mother 62   • Hypertension Mother    • Cancer Father    • Pancreatic cancer Father 61   • Hypertension Son    • Hypertension Daughter      Social History   Social History     Substance and Sexual Activity   Alcohol Use No     Social History     Substance and Sexual Activity   Drug Use No     Social History     Tobacco Use   Smoking Status Never   Smokeless Tobacco Never   Tobacco Comments    NO TOBACCO USE     Meds/Allergies     Current Outpatient Medications:   •  anastrozole (ARIMIDEX) 1 mg tablet, Take 1 tablet (1 mg total) by mouth daily, Disp: 90 tablet, Rfl: 3  •  atorvastatin (LIPITOR) 40 mg tablet, Take 1 tablet (40 mg total) by mouth daily at bedtime, Disp: 30 tablet, Rfl: 2  •  fluorouracil 5,375 mg in CADD/Elastomeric Infusion Device, Infuse 5,375 mg (1,200 mg/m2/day x 2.24 m2) into a catheter in a vein via infusion device over 46 hours for 2 days  Infusion planned for July 31, 2023., Disp: 1 each, Rfl: 0  •  lidocaine-prilocaine (EMLA) cream, Apply topically as needed for mild pain, Disp: 30 g, Rfl: 3  •  losartan (COZAAR) 50 mg tablet, Take 1 tablet (50 mg total) by mouth daily, Disp: 90 tablet, Rfl: 1  •  omeprazole (PriLOSEC) 20 mg delayed release capsule, Take 1 capsule (20 mg total) by mouth daily, Disp: 30 capsule, Rfl: 5  •  ondansetron (ZOFRAN) 8 mg tablet, Take 1 tablet (8 mg total) by mouth every 8 (eight) hours as needed for nausea or vomiting, Disp: 30 tablet, Rfl: 3  •  prochlorperazine (COMPAZINE) 10 mg tablet, Take 1 tablet (10 mg total) by mouth every 6 (six) hours as needed for nausea or vomiting, Disp: 30 tablet, Rfl: 3  •  polyethylene glycol (GOLYTELY) 4000 mL solution, Take 4,000 mL by mouth once for 1 dose, Disp: 4000 mL, Rfl: 0  No current facility-administered medications for this visit. Facility-Administered Medications Ordered in Other Visits:   •  alteplase (CATHFLO) injection 2 mg, 2 mg, Intracatheter, Q1MIN PRN, Vincent Pancoast, DO  •  sodium chloride 0.9 % infusion, 20 mL/hr, Intravenous, Once PRN, Vincent Pancoast, DO  No Known Allergies      Pathology:  See above. Review of Systems  Refer to nursing note    OBJECTIVE:   /80 (BP Location: Left arm)   Pulse 93   Temp 97.7 °F (36.5 °C) (Temporal)   Resp 18   Wt 121 kg (267 lb)   SpO2 98%   BMI 43.43 kg/m²   Pain Assessment:  0  Performance Status: ECO - Asymptomatic    Physical Exam  General Appearance:  Alert, cooperative, no distress, appears stated age. Chemo pump in place  HEENT: Large palpable mass without skin involvement in the right upper neck, mass appears fixed. Oral exam shows exophytic right tonsillar lesion, slightly erythematous and nodular. No invasion of the soft palate. She wears upper dentures, and has a few lower teeth. No suspicious palpable abnormalities in the left neck. Cardiovascular:  Extremities warm and well perfused  Lungs: Respirations unlabored, no cyanosis, able to speak in complete sentences without dyspnea. Abdomen: Non-distended  Skin: No generalized rash or dermatitis. Breast exam deferred. Neurologic: ANOx3, speech and cognition intact. Portions of the record may have been created with voice recognition software. Occasional wrong word or "sound a like" substitutions may have occurred due to the inherent limitations of voice recognition software. Read the chart carefully and recognize, using context, where substitutions have occurred.

## 2023-08-01 NOTE — LETTER
2023     [address] [place] [address]    Patient: Suresh Olivares   YOB: 1955   Date of Visit: 2023       Dear Dr. Kristal Devlin:    Thank you for referring Suresh Olivares to me for evaluation. Below are my notes for this consultation. If you have questions, please do not hesitate to call me. I look forward to following your patient along with you. Sincerely,        Karey Vazquez MD        CC: Shreya Astorga MD  2023  4:56 PM  Sign when Signing Visit  Consultation - Radiation Oncology      Patient Name: Suresh Olivares ADH:[id number] : 1955  Encounter: [id number]  Referring Provider: Jas Acosta*    ASSESSMENT  Cancer Staging   Metastatic colon cancer to [facility])  Staging form: Colon and Rectum, AJCC 8th Edition  - Clinical stage from 2023: Stage Unknown (cTX, cNX, pM1) - Signed by José Miguel Rojas DO on 2023    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[address])  Staging form: Pharynx - Oropharynx, AJCC 8th Edition  - Clinical stage from 2023: Stage III (cT1, cN3, cM0, p16+) - Signed by Jas Acosta MD on 2023  Stage prefix: Initial diagnosis    PLAN  Suresh Olivares is a 80 y.o. female with a history of prior breast cancer s/p BCS and whole breast RT completing in 2019, with recent diagnosis of synchronous metastatic colon cancer (M1) as well as right tonsillar cancer ([id number]). Imaging including CT neck on 3/30/23 and PET/CT on 23 show a large right level 2A lymph node measuring 4.7cm with nodular FDG activity along the right tonsil with soft tissue measuring 1.5cm. There are mildly prominent borderline enlarged mildly FDG avid left cervical lymph nodes suspicious for developing left-sided claudio disease.   Other findings on her PET scan include a right lower lobe nodule suspicious but not definitive for hypermetabolic disease, liver metastases, and focal hypermetabolic mural thickening of the proximal colon. Biopsy of the right level IIA node showed suspicions for malignancy, with DL and right tonsillar biopsy on 7/20/2023 showing p16 positive invasive squamous cell carcinoma. Liver biopsy on 6/27/23 showed metastatic adenocarcinoma suggestive of colorectal primary. Colon biopsy showed well-differentiated adenocarcinoma. She has established care with medical oncology and ENT, and discussed various treatment approaches. Her case will be reviewed at multidisciplinary tumor conference given two active malignancies at this time. She has initiated systemic therapy with FOLFOX for metastatic colon cancer given extensive liver burden. I would tend to agree that her metastatic malignancy takes first priority, with the knowledge that she will also need treatment for the head and neck cancer in the near future. One approach as suggested by medical oncology is to initiate a few cycles of FOLFOX, re-evaluate and then proceed with treatment for the [address]. If group consensus after tumor board discussion is for definitive chemoradiation therapy, she would require 70Gy in 35fx with IMRT dosepainting to the bilateral necks. Risks, benefits and side effects were discussed in extensive detail and the patient's questions were answered. Acute and late side effects discussed include, but are not limited to, mucositis, dermatitis, pain, xerostomia, dysgeusia, thick mucus, fatigue, hair loss, weight loss, short term or long term dysphagia ± feeding tube requirement, trismus, fibrosis of the neck muscles, hypothyroidism, xerostomia, nerve injury, risk for osteoradionecrosis, skin atrophy, lymphedema or swelling in the head and neck, and vascular cartilage injury. We reviewed that even with appropriate therapy there is still a risk of recurrence.     Joann Heath demonstrated a good understanding of our discussion and all questions were answered to the patient's apparent satisfaction. The patient was agreeable to proceed with radiation therapy, and informed consent was signed.     -When appropriate after first phase of therapy for colon cancer, CT simulation can be at [facility], with treatments at [facility]. -IV Contrast form signed  -Dental evaluation prior to RT requested, patient will be scheduled with [address] on 8/8/23  -Speech & Swallow Referral discussed, may have to wait while plan is formalized  -Nutrition Referral would be helpful. Thank you for the opportunity to participate in the care of this patient. Marsha Coates MD  Department of [facility] [facility]    Orders Placed This Encounter   Procedures   • Radiation Simulation Treatment     1. Right tonsillar squamous cell carcinoma [facility])  Ambulatory Referral to Radiation Oncology    Radiation Simulation Treatment        Total Time Spent  60 minutes spent reviewing EMR in preparation for visit, with the patient, coordination with other providers, review of radiology, and documentation. CHIEF COMPLAINT  Chief Complaint   Patient presents with   • Consult     Radiation Oncology        History of Present Illness  Jojo Marti 1955 is a 80 y.o. female presents for initial consult, referred by Dr. Sherine Dunn for right tonsillar squamous cell carcinoma. History of T2 N0 (stage IIA) invasive ductal carcinoma of the right breast status post breast conservation surgery and whole breast radiation, completed on 4/3/19. Patient with metastatic adenocarcinoma of colorectal origin w/ mets in chest, abdomen, pelvis, lever, ascending colon. Left neck swelling since February. CT of the neck 3/30/23 showed lymphadenopathy and a thyroid lesion. FNA 5/26/23 of lymphadenopathy in the neck showed carcinoma, not further classified. PET CT 6/19/23 showed diffuse metastatic disease in the lymph nodes of the neck as well as the liver.  Biopsy 6/23/23 of the liver lesion that showed adenocarcinoma with a possible colon primary. Recent DL in OR w/ R tonsillar biopsy 7/20/23 showed p16+ SCC. Seen by Dr. Grupo Iglesias, plan to initiate FOLFOX. 3/30/23 - CT soft tissue of neck  IMPRESSION:  Large right level 2A lymphadenopathy as described above and suspicious for neoplasm. Correlation with histopathology is recommended. Mild asymmetric prominence of the right palatine tonsil with otherwise no definitive nodular enhancing lesions identified along the course of the aerodigestive tract. Heterogeneous right thyroid lobe nodule. Ultrasound is recommended for further evaluation. 5/26/23 - Biopsy:  Final Diagnosis:  A. & B. Lymph Node, Right Level II A: (Thin-Prep and smears)  Suspicious for malignancy. Clusters of atypical epithelioid cells in a background of lymphoid cells, suspicious for carcinoma. See note. 6/19/23 - NM PET CT  IMPRESSION:  1. Multifocal hypermetabolic malignancy/metastatic disease involving the neck, abdomen, and likely chest.  Specifically in the neck, there is hypermetabolic cervical lymphadenopathy as well as hypermetabolic asymmetric soft tissue prominence to the right tonsillar region for which correlation with direct visualization and possibly tissue sampling is recommended to exclude the possibility of underlying right tonsillar malignancy. Additional nonspecific hypermetabolic soft tissue is noted in the left tonsillar region extending to the base of the tongue/vallecular regions. Patient's known right thyroid lobe nodule is non-FDG avid and remains of uncertain clinical significance. In the chest, there is a 0.8 cm minimally to mildly FDG avid right lower lobe nodule suspicious for malignancy/metastatic disease. In the abdomen, there is multifocal hypermetabolic hepatic malignancy/metastatic disease.  Additionally, there is focal hypermetabolic concentric mural thickening of the proximal to mid ascending colon suspicious for colonic malignancy which can be further characterized with direct visualization/colonoscopy. 2. Indeterminant 7.8 cm minimally to mildly FDG avid lobulated right pelvic mass in the right adnexal region which is poorly characterized on low-dose unenhanced CT and could reflect exophytic uterine fibroid or right adnexal/ovarian mass. Further evaluation with pelvic ultrasound (MRI if needed) is recommended for further characterization as clinically indicated. 6/27/23 - Biopsy:  Final Diagnosis:  A. Liver, Biopsy:  - Metastatic adenocarcinoma, suggestive of colorectal primary.  - Background hepatic steatosis. 7/6/23 - MRI abdomen and pelvis cancer  IMPRESSION:  Multiple hepatic metastases. Nonocclusive bland thrombus in the anterior right portal vein. Partially visualized circumferential mural thickening and enhancement of the ascending colon, likely corresponding to the patient's known colonic malignancy. Thickened endometrium with a 5 mm enhancing endometrial lesion. Differential diagnosis includes endometrial polyp and/or endometrial carcinoma. Recommend further evaluation with pelvic ultrasound. Right adnexal mass abutting the uterus, likely representing a pedunculated uterine fibroid. 7/11/23 - Hematology Oncology - Dr. Lelia Donovan  History of early-stage breast cancer on anastrozole now with a new diagnosis of metastatic colon cancer  Need to evaluate her upper airway and tonsil because there could be a concern for second primary cancer, especially with the amount of lymphadenopathy in her neck. Discussed metastatic colon cancer. Ordered port placement and we discussed starting FOLFOX chemotherapy. 7/20/23 - Surgery - Dr. Mackenzie Tena laryngoscopy with biopsy  Bronchoscopy  Esophagoscopy  Final Diagnosis:  A.  Tonsil, right, biopsy:  -Invasive squamous cell carcinoma, p16 positive.  -Immunohistochemical stains performed with appropriate controls show the tumor to be positive for CK5/6, p40, and p16 and negative  for ER, GATA3, and mammaglobin, supporting the diagnosis. 23 - ENT- Dr. Shannan Varner in OR w/ R tonsillar biopsy showed p16+ SCC. Pt will need appt w/ SLP, med/rad onc, and palliative for coordinative care moving forward. Will continue discussion w/ cancer care team to decide on optimal tx course, including CRT. Upcomin/3/23 - Palliative  23 - Hematology Oncology- Dr. Maris Romero  23 - Surgical Oncology - Dr. Madi Salvador  10/24/23 - ENT - Dr. Tacho Andrews    Today, the patient presents with her daughter. She notes fatigue, abdominal cramping, urinary frequency, and dizziness. She does not have sore throat or trouble swallowing at this time. She does note palpable mass in the right upper neck. She is not currently working at this time. Oncology History   Malignant neoplasm of right female breast ([address])   2018 Initial Diagnosis    Malignant neoplasm of right female breast ([address])     2018 Biopsy    Rt Breast US BX 1100 8cmfn, 4 passes 12g Marquee:  - Invasive breast carcinoma of no special type (ductal NST/invasive ductal carcinoma).   - grade 2  - %  - AR 95%  - HER2 negative     2018 Surgery    Right partial mastectomy and SLN biopsy:  Infiltrating ductal carcinoma, Grade 2/3, felt to be between 2.0 cm and 2.5 cm in greatest dimension  - No invasive tumor is seen at inked margins, but there is a focus of DCIS seen within approximately 1mm of the inked medial margin  - no LVI  - no LCIS  - 0/2 LN's  at least Stage IIA - pT2, pN0, cM0, G2.    - Dr Juan Sanon     2018 -  Cancer Staged    Stage IIA - pT2, pN0, cM0, G2.       2019 Genomic Testing    Oncotype DX score: 13     2019 -  Hormone Therapy    anastrozole 1 mg daily as adjuvant endocrine therapy    - Dr Jose Sampson       2019 - 4/3/2019 Radiation    Course: C1    Plan ID Energy Fractions Dose per Fraction (cGy) Dose Correction (cGy) Total Dose Delivered (cGy) Elapsed Days   R Breast 10X/6X 25 / 25 200 0 5,000 40   R BRST BOOST 16E 5 / 5 200 0 1,000 7      Treatment dates:  C1: 2/14/2019 - 4/3/2019       Metastatic colon cancer to liver ([address])   7/11/2023 Initial Diagnosis    Metastatic colon cancer to liver ([address])     7/13/2023 -  Cancer Staged    Staging form: Colon and Rectum, AJCC 8th Edition  - Clinical stage from 7/13/2023: Stage Unknown (cTX, cNX, pM1) - Signed by Mignon Melton DO on 7/13/2023 7/31/2023 -  Chemotherapy    alteplase (CATHFLO), 2 mg, Intracatheter, Every 1 Minute as needed, 1 of 12 cycles  fluorouracil (ADRUCIL), 895 mg, Intravenous, Once, 1 of 12 cycles  Administration: 900 mg (7/31/2023)  leucovorin calcium IVPB, 896 mg, Intravenous, Once, 1 of 12 cycles  Administration: 900 mg (7/31/2023)  oxaliplatin (ELOXATIN) chemo infusion, 85 mg/m2 = 190.4 mg, Intravenous, Once, 1 of 12 cycles  Administration: 190.4 mg (7/31/2023)  fluorouracil (ADRUCIL) ambulatory infusion Soln, 1,200 mg/m2/day = 5,375 mg, Intravenous, Over 46 hours, 1 of 12 cycles     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HCC)   7/27/2023 Initial Diagnosis    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[address])     7/27/2023 -  Cancer Staged    Staging form: Pharynx - Oropharynx, AJCC 8th Edition  - Clinical stage from 7/27/2023: Stage III (cT1, cN3, cM0, p16+) - Signed by Himanshu Munguia MD on 7/27/2023  Stage prefix: Initial diagnosis         Historical Information   Past Medical History:   Diagnosis Date   • Anemia    • Arthritis    • Breast cancer ([address]) 12/17/2018   • Cancer ([address])     right breast   • GERD (gastroesophageal reflux disease)    • Hyperlipidemia    • Hypertension    • Obesity    • Stroke [facility])     TIA    • Transaminitis 9/22/2021     Past Surgical History:   Procedure Laterality Date   • BREAST BIOPSY Right 11/23/2018    us guided bx cancer   • BREAST SURGERY     • ESOPHAGOSCOPY N/A 7/20/2023    Procedure: ESOPHAGOSCOPY;  Surgeon: Jovany Alves Segundo Arellano MD;  [facility];  Service: ENT   • IR BIOPSY LIVER MASS  6/27/2023   • IR PORT PLACEMENT  7/28/2023   •  [address] INCL FLUOR GDNCE DX W/CELL WASHG [facility] N/A 7/20/2023    Procedure: BRONCHOSCOPY;  Surgeon: Joselin Corbin MD;  Location: [facility];  Service: ENT   • MD LARYNGOSCOPY W/BIOPSY MICROSCOPE/TELESCOPE N/A 7/20/2023    Procedure: MICRODIRECT LARYNGOSCOPY WITH BIOPSY;  Surgeon: Joselin Corbin MD;  [facility];  Service: ENT   • MD MASTECTOMY PARTIAL W/AXILLARY LYMPHADENECTOMY Right 12/20/2018    Procedure: ULTRASOUND LOCALIZED PARTIAL MASTECTOMY W/SENTINEL NODE BIOPSY POSS AXILLARY DISSECTION;  Surgeon: Gabriel Mccoy MD;  Location:  MAIN OR;  Service: General   • TUBAL LIGATION     • US GUIDED BREAST BIOPSY RIGHT COMPLETE Right 11/23/2018   • US GUIDED INJECTION FOR RESEARCH STUDY  12/20/2018   • US GUIDED INJECTION FOR RESEARCH STUDY  12/7/2018   • US GUIDED INJECTION FOR RESEARCH STUDY  5/3/2019   • US GUIDED LYMPH NODE BIOPSY RIGHT  5/26/2023     Family History   Problem Relation Age of Onset   • Colon cancer Mother 62   • Hypertension Mother    • Cancer Father    • Pancreatic cancer Father 61   • Hypertension Son    • Hypertension Daughter      Social History   Social History     Substance and Sexual Activity   Alcohol Use No     Social History     Substance and Sexual Activity   Drug Use No     Social History     Tobacco Use   Smoking Status Never   Smokeless Tobacco Never   Tobacco Comments    NO TOBACCO USE     Meds/Allergies     Current Outpatient Medications:   •  anastrozole (ARIMIDEX) 1 mg tablet, Take 1 tablet (1 mg total) by mouth daily, Disp: 90 tablet, Rfl: 3  •  atorvastatin (LIPITOR) 40 mg tablet, Take 1 tablet (40 mg total) by mouth daily at bedtime, Disp: 30 tablet, Rfl: 2  •  fluorouracil 5,375 mg in CADD/Elastomeric Infusion Device, Infuse 5,375 mg (1,200 mg/m2/day x 2.24 m2) into a catheter in a vein via infusion device over 46 hours for 2 days Infusion planned for 2023., Disp: 1 each, Rfl: 0  •  lidocaine-prilocaine (EMLA) cream, Apply topically as needed for mild pain, Disp: 30 g, Rfl: 3  •  losartan (COZAAR) 50 mg tablet, Take 1 tablet (50 mg total) by mouth daily, Disp: 90 tablet, Rfl: 1  •  omeprazole (PriLOSEC) 20 mg delayed release capsule, Take 1 capsule (20 mg total) by mouth daily, Disp: 30 capsule, Rfl: 5  •  ondansetron (ZOFRAN) 8 mg tablet, Take 1 tablet (8 mg total) by mouth every 8 (eight) hours as needed for nausea or vomiting, Disp: 30 tablet, Rfl: 3  •  prochlorperazine (COMPAZINE) 10 mg tablet, Take 1 tablet (10 mg total) by mouth every 6 (six) hours as needed for nausea or vomiting, Disp: 30 tablet, Rfl: 3  •  polyethylene glycol (GOLYTELY) 4000 mL solution, Take 4,000 mL by mouth once for 1 dose, Disp: 4000 mL, Rfl: 0  No current facility-administered medications for this visit. Facility-Administered Medications Ordered in Other Visits:   •  alteplase (CATHFLO) injection 2 mg, 2 mg, Intracatheter, Q1MIN PRN, Ro Angeles, DO  •  sodium chloride 0.9 % infusion, 20 mL/hr, Intravenous, Once PRN, Ro Angeles, DO  No Known Allergies     Pathology:  See above. Review of Systems  Refer to nursing note    OBJECTIVE:   /80 (BP Location: Left arm)   Pulse 93   Temp 97.7 °F (36.5 °C) (Temporal)   Resp 18   Wt 121 kg (267 lb)   SpO2 98%   BMI 43.43 kg/m²   Pain Assessment:  0  Performance Status: ECO - Asymptomatic    Physical Exam  General Appearance:  Alert, cooperative, no distress, appears stated age. Chemo pump in place  HEENT: Large palpable mass without skin involvement in the right upper neck, mass appears fixed. Oral exam shows exophytic right tonsillar lesion, slightly erythematous and nodular. No invasion of the soft palate. She wears upper dentures, and has a few lower teeth. No suspicious palpable abnormalities in the left neck.   Cardiovascular:  Extremities warm and well perfused  Lungs: Respirations unlabored, no cyanosis, able to speak in complete sentences without dyspnea. Abdomen: Non-distended  Skin: No generalized rash or dermatitis. Breast exam deferred. Neurologic: ANOx3, speech and cognition intact. Portions of the record may have been created with voice recognition software. Occasional wrong word or "sound a like" substitutions may have occurred due to the inherent limitations of voice recognition software. Read the chart carefully and recognize, using context, where substitutions have occurred.

## 2023-08-01 NOTE — PROGRESS NOTES
Rosemarie Pat 1955 is a 80 y.o. female presents for initial consult, referred by Dr. Augustin Richards for right tonsillar squamous cell carcinoma. History of T2 N0 (stage IIA) invasive ductal carcinoma of the right breast status post breast conservation surgery and whole breast radiation, completed on 4/3/19. Patient with metastatic adenocarcinoma of colorectal origin w/ mets in chest, abdomen, pelvis, lever, ascending colon. Left neck swelling since February. CT of the neck 3/30/23 showed lymphadenopathy and a thyroid lesion. FNA 5/26/23 of lymphadenopathy in the neck showed carcinoma, not further classified. PET CT 6/19/23 showed diffuse metastatic disease in the lymph nodes of the neck as well as the liver. Biopsy 6/23/23 of the liver lesion that showed adenocarcinoma with a possible colon primary. Recent DL in OR w/ R tonsillar biopsy 7/20/23 showed p16+ SCC. Seen by Dr. Patrick Torres, plan to initiate FOLFOX. 3/30/23 - CT soft tissue of neck  IMPRESSION:  Large right level 2A lymphadenopathy as described above and suspicious for neoplasm. Correlation with histopathology is recommended. Mild asymmetric prominence of the right palatine tonsil with otherwise no definitive nodular enhancing lesions identified along the course of the aerodigestive tract. Heterogeneous right thyroid lobe nodule. Ultrasound is recommended for further evaluation. 5/26/23 - Biopsy:  Final Diagnosis:  A. & B. Lymph Node, Right Level II A: (Thin-Prep and smears)  Suspicious for malignancy. Clusters of atypical epithelioid cells in a background of lymphoid cells, suspicious for carcinoma. See note.       6/19/23 - NM PET CT  IMPRESSION:  1. Multifocal hypermetabolic malignancy/metastatic disease involving the neck, abdomen, and likely chest.  Specifically in the neck, there is hypermetabolic cervical lymphadenopathy as well as hypermetabolic asymmetric soft tissue prominence to the right tonsillar region for which correlation with direct visualization and possibly tissue sampling is recommended to exclude the possibility of underlying right tonsillar malignancy. Additional nonspecific hypermetabolic soft tissue is noted in the left tonsillar region extending to the base of the tongue/vallecular regions. Patient's known right thyroid lobe nodule is non-FDG avid and remains of uncertain clinical significance. In the chest, there is a 0.8 cm minimally to mildly FDG avid right lower lobe nodule suspicious for malignancy/metastatic disease. In the abdomen, there is multifocal hypermetabolic hepatic malignancy/metastatic disease. Additionally, there is focal hypermetabolic concentric mural thickening of the proximal to mid ascending colon suspicious for colonic malignancy which can be further characterized with direct visualization/colonoscopy. 2. Indeterminant 7.8 cm minimally to mildly FDG avid lobulated right pelvic mass in the right adnexal region which is poorly characterized on low-dose unenhanced CT and could reflect exophytic uterine fibroid or right adnexal/ovarian mass. Further evaluation with pelvic ultrasound (MRI if needed) is recommended for further characterization as clinically indicated. 6/27/23 - Biopsy:  Final Diagnosis:  A. Liver, Biopsy:  - Metastatic adenocarcinoma, suggestive of colorectal primary.  - Background hepatic steatosis. 7/6/23 - MRI abdomen and pelvis cancer  IMPRESSION:  Multiple hepatic metastases. Nonocclusive bland thrombus in the anterior right portal vein. Partially visualized circumferential mural thickening and enhancement of the ascending colon, likely corresponding to the patient's known colonic malignancy. Thickened endometrium with a 5 mm enhancing endometrial lesion. Differential diagnosis includes endometrial polyp and/or endometrial carcinoma. Recommend further evaluation with pelvic ultrasound.      Right adnexal mass abutting the uterus, likely representing a pedunculated uterine fibroid. 23 - Hematology Oncology - Dr. Patrick Torres  History of early-stage breast cancer on anastrozole now with a new diagnosis of metastatic colon cancer  Need to evaluate her upper airway and tonsil because there could be a concern for second primary cancer, especially with the amount of lymphadenopathy in her neck. Discussed metastatic colon cancer. Ordered port placement and we discussed starting FOLFOX chemotherapy. 23 - Surgery - Dr. Negin Henry laryngoscopy with biopsy  Bronchoscopy  Esophagoscopy  Final Diagnosis:  A. Tonsil, right, biopsy:  -Invasive squamous cell carcinoma, p16 positive.  -Immunohistochemical stains performed with appropriate controls show the tumor to be positive for CK5/6, p40, and p16 and negative  for ER, GATA3, and mammaglobin, supporting the diagnosis. 23 - ENT- Dr. Rene Cavazos in OR w/ R tonsillar biopsy showed p16+ SCC. Pt will need appt w/ SLP, med/rad onc, and palliative for coordinative care moving forward. Will continue discussion w/ cancer care team to decide on optimal tx course, including CRT. Upcomin/3/23 - Palliative  23 - Hematology Oncology- Dr. Patrick Torres  23 - Surgical Oncology - Dr. Cecilia Rene  10/24/23 - ENT - Dr. Augustin Richards        Oncology History   Malignant neoplasm of right female breast ([address])   2018 Initial Diagnosis    Malignant neoplasm of right female breast ([address])     2018 Biopsy    Rt Breast US BX 1100 8cmfn, 4 passes 12g Marquee:  - Invasive breast carcinoma of no special type (ductal NST/invasive ductal carcinoma).   - grade 2  - %  - MA 95%  - HER2 negative     2018 Surgery    Right partial mastectomy and SLN biopsy:  Infiltrating ductal carcinoma, Grade 2/3, felt to be between 2.0 cm and 2.5 cm in greatest dimension  - No invasive tumor is seen at inked margins, but there is a focus of DCIS seen within approximately 1mm of the inked medial margin  - no LVI  - no LCIS  - 0/2 LN's  at least Stage IIA - pT2, pN0, cM0, G2.    - Dr Farida Schmidt     12/20/2018 -  Cancer Staged    Stage IIA - pT2, pN0, cM0, G2.       1/4/2019 Genomic Testing    Oncotype DX score: 13     2/1/2019 -  Hormone Therapy    anastrozole 1 mg daily as adjuvant endocrine therapy    - Dr John Marshall       2/14/2019 - 4/3/2019 Radiation    Course: C1    Plan ID Energy Fractions Dose per Fraction (cGy) Dose Correction (cGy) Total Dose Delivered (cGy) Elapsed Days   R Breast 10X/6X 25 / 25 200 0 5,000 40   R BRST BOOST 16E 5 / 5 200 0 1,000 7      Treatment dates:  C1: 2/14/2019 - 4/3/2019       Metastatic colon cancer to liver ([address])   7/11/2023 Initial Diagnosis    Metastatic colon cancer to liver ([address])     7/13/2023 -  Cancer Staged    Staging form: Colon and Rectum, AJCC 8th Edition  - Clinical stage from 7/13/2023: Stage Unknown (cTX, cNX, pM1) - Signed by Sita Ray DO on 7/13/2023 7/31/2023 -  Chemotherapy    alteplase (CATHFLO), 2 mg, Intracatheter, Every 1 Minute as needed, 1 of 12 cycles  fluorouracil (ADRUCIL), 895 mg, Intravenous, Once, 1 of 12 cycles  Administration: 900 mg (7/31/2023)  leucovorin calcium IVPB, 896 mg, Intravenous, Once, 1 of 12 cycles  Administration: 900 mg (7/31/2023)  oxaliplatin (ELOXATIN) chemo infusion, 85 mg/m2 = 190.4 mg, Intravenous, Once, 1 of 12 cycles  Administration: 190.4 mg (7/31/2023)  fluorouracil (ADRUCIL) ambulatory infusion Soln, 1,200 mg/m2/day = 5,375 mg, Intravenous, Over 46 hours, 1 of 12 cycles     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HCC)   7/27/2023 Initial Diagnosis    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[address])     7/27/2023 -  Cancer Staged    Staging form: Pharynx - Oropharynx, AJCC 8th Edition  - Clinical stage from 7/27/2023: Stage III (cT1, cN3, cM0, p16+) - Signed by Aspen Zelaya MD on 7/27/2023  Stage prefix: Initial diagnosis           Review of Systems:  Review of Systems   Constitutional: Positive for fatigue. HENT: Negative for sore throat and trouble swallowing. Eyes: Negative. Respiratory: Negative. Cardiovascular: Negative. Gastrointestinal: Positive for abdominal pain (cramping at times). Negative for constipation, diarrhea and nausea. Endocrine: Negative. Genitourinary: Positive for frequency. Musculoskeletal: Positive for arthralgias (fingers). Negative for back pain. Skin: Negative. Allergic/Immunologic: Negative. Neurological: Positive for dizziness (in the mornings ). Negative for numbness and headaches. Hematological: Negative. Psychiatric/Behavioral: Negative for sleep disturbance.        Clinical Trial: no    Pregnancy test needed:  no    ONCOTYPE/MAMMOPRINT results: n/a    PFT: n/a    Prior Radiation: yes, right breast 2019    Teaching: NCI radiation packet     MST completed     Implantable Devices (Port, Pacemaker, pain stimulator): RCW port     Hip Replacement: no      Health Maintenance   Topic Date Due   • Medicare Annual Wellness Visit (AWV)  Never done   • COVID-19 Vaccine (1) Never done   • BMI: Followup Plan  Never done   • Cervical Cancer Screening  Never done   • Urinary Incontinence Screening  Never done   • PT PLAN OF CARE  05/14/2021   • Pneumococcal Vaccine: 65+ Years (2 - PCV) 09/22/2022   • Fall Risk  07/07/2023   • Influenza Vaccine (1) 09/01/2023   • Breast Cancer Screening: Mammogram  11/08/2023   • BMI: Adult  07/31/2024   • Depression Screening  08/01/2024   • Hepatitis C Screening  Completed   • Osteoporosis Screening  Completed   • HIB Vaccine  Aged Out   • IPV Vaccine  Aged Out   • Hepatitis A Vaccine  Aged Out   • Meningococcal ACWY Vaccine  Aged Out   • HPV Vaccine  Aged Out   • Colorectal Cancer Screening  Discontinued     Past Medical History:   Diagnosis Date   • Anemia    • Arthritis    • Breast cancer ([address]) 12/17/2018   • Cancer ([address])     right breast   • GERD (gastroesophageal reflux disease)    • Hyperlipidemia    • Hypertension    • Obesity    • Stroke [facility])     TIA    • Transaminitis 9/22/2021     Past Surgical History:   Procedure Laterality Date   • BREAST BIOPSY Right 11/23/2018    us guided bx cancer   • BREAST SURGERY     • ESOPHAGOSCOPY N/A 7/20/2023    Procedure: ESOPHAGOSCOPY;  Surgeon: Joselin Corbin MD;  [facility];  Service: ENT   • IR BIOPSY LIVER MASS  6/27/2023   • IR PORT PLACEMENT  7/28/2023   •  [address] INCL FLUOR GDNCE DX W/CELL WASHG [facility] N/A 7/20/2023    Procedure: BRONCHOSCOPY;  Surgeon: Joselin Corbin MD;  [facility];  Service: ENT   • ME LARYNGOSCOPY W/BIOPSY MICROSCOPE/TELESCOPE N/A 7/20/2023    Procedure: MICRODIRECT LARYNGOSCOPY WITH BIOPSY;  Surgeon: Joselin Corbin MD;  [facility];  Service: ENT   • ME MASTECTOMY PARTIAL W/AXILLARY LYMPHADENECTOMY Right 12/20/2018    Procedure: ULTRASOUND LOCALIZED PARTIAL MASTECTOMY W/SENTINEL NODE BIOPSY POSS AXILLARY DISSECTION;  Surgeon: Gabriel Mccoy MD;  Location:  MAIN OR;  Service: General   • TUBAL LIGATION     • US GUIDED BREAST BIOPSY RIGHT COMPLETE Right 11/23/2018   • US GUIDED INJECTION FOR RESEARCH STUDY  12/20/2018   • US GUIDED INJECTION FOR RESEARCH STUDY  12/7/2018   • US GUIDED INJECTION FOR RESEARCH STUDY  5/3/2019   • US GUIDED LYMPH NODE BIOPSY RIGHT  5/26/2023     Family History   Problem Relation Age of Onset   • Colon cancer Mother 62   • Hypertension Mother    • Cancer Father    • Pancreatic cancer Father 61   • Hypertension Son    • Hypertension Daughter      Social History     Tobacco Use   • Smoking status: Never   • Smokeless tobacco: Never   • Tobacco comments:     NO TOBACCO USE   Vaping Use   • Vaping Use: Never used   Substance Use Topics   • Alcohol use: No   • Drug use: No        Current Outpatient Medications:   •  anastrozole (ARIMIDEX) 1 mg tablet, Take 1 tablet (1 mg total) by mouth daily, Disp: 90 tablet, Rfl: 3  •  atorvastatin (LIPITOR) 40 mg tablet, Take 1 tablet (40 mg total) by mouth daily at bedtime, Disp: 30 tablet, Rfl: 2  •  fluorouracil 5,375 mg in CADD/Elastomeric Infusion Device, Infuse 5,375 mg (1,200 mg/m2/day x 2.24 m2) into a catheter in a vein via infusion device over 46 hours for 2 days  Infusion planned for July 31, 2023., Disp: 1 each, Rfl: 0  •  lidocaine-prilocaine (EMLA) cream, Apply topically as needed for mild pain, Disp: 30 g, Rfl: 3  •  losartan (COZAAR) 50 mg tablet, Take 1 tablet (50 mg total) by mouth daily, Disp: 90 tablet, Rfl: 1  •  omeprazole (PriLOSEC) 20 mg delayed release capsule, Take 1 capsule (20 mg total) by mouth daily, Disp: 30 capsule, Rfl: 5  •  ondansetron (ZOFRAN) 8 mg tablet, Take 1 tablet (8 mg total) by mouth every 8 (eight) hours as needed for nausea or vomiting, Disp: 30 tablet, Rfl: 3  •  prochlorperazine (COMPAZINE) 10 mg tablet, Take 1 tablet (10 mg total) by mouth every 6 (six) hours as needed for nausea or vomiting, Disp: 30 tablet, Rfl: 3  •  polyethylene glycol (GOLYTELY) 4000 mL solution, Take 4,000 mL by mouth once for 1 dose, Disp: 4000 mL, Rfl: 0  No current facility-administered medications for this visit.     Facility-Administered Medications Ordered in Other Visits:   •  alteplase (CATHFLO) injection 2 mg, 2 mg, Intracatheter, Q1MIN PRN, Ramin Buck DO  •  sodium chloride 0.9 % infusion, 20 mL/hr, Intravenous, Once PRN, Ramin Buck DO  No Known Allergies   Vitals:    08/01/23 0848   BP: 132/80   BP Location: Left arm   Pulse: 93   Resp: 18   Temp: 97.7 °F (36.5 °C)   TempSrc: Temporal   SpO2: 98%   Weight: 121 kg (267 lb)

## 2023-08-01 NOTE — TELEPHONE ENCOUNTER
Received notification by Viktoriya Elias., on 8/1/23 that pt has triggered for oncology nutrition care (reason for referral: HNC dx and Malnutrition Screening Tool (MST) Triggers: scored a 0 indicating No recent wt loss and is not eating poorly due to a decreased appetite. (Date of MST: 8/1/23)). Contacted Maira today to establish care. No answer. Left voice message with the reason for today's call and this RD’s contact information asking that Maira call back as able/desired.

## 2023-08-02 ENCOUNTER — HOSPITAL ENCOUNTER (OUTPATIENT)
Dept: INFUSION CENTER | Facility: CLINIC | Age: 68
Discharge: HOME | End: 2023-08-02

## 2023-08-02 ENCOUNTER — PATIENT OUTREACH (OUTPATIENT)
Dept: HEMATOLOGY ONCOLOGY | Facility: CLINIC | Age: 68
End: 2023-08-02

## 2023-08-02 VITALS
HEART RATE: 89 BPM | TEMPERATURE: 97.4 F | DIASTOLIC BLOOD PRESSURE: 62 MMHG | RESPIRATION RATE: 18 BRPM | SYSTOLIC BLOOD PRESSURE: 125 MMHG

## 2023-08-02 DIAGNOSIS — C78.7 METASTATIC COLON CANCER TO LIVER (HCC): Primary | ICD-10-CM

## 2023-08-02 DIAGNOSIS — C18.9 METASTATIC COLON CANCER TO LIVER (HCC): Primary | ICD-10-CM

## 2023-08-02 NOTE — PROGRESS NOTES
Pt here for BRANDIE disconnect. BRANDIE fully deflated and infused over 46 hrs. BRANDIE disconnected and port flushed per protocol. Pt was provided with AVS and is aware of future appts.

## 2023-08-02 NOTE — PROGRESS NOTES
Phone outreach, no answer, left VM, stated my name and title, provided my contact info for the pt to call me back. Pt has completed cycle one of chemotherapy.

## 2023-08-03 ENCOUNTER — CLINICAL SUPPORT (OUTPATIENT)
Dept: PALLIATIVE MEDICINE | Facility: CLINIC | Age: 68
End: 2023-08-03
Payer: MEDICARE

## 2023-08-03 ENCOUNTER — CONSULT (OUTPATIENT)
Dept: PALLIATIVE MEDICINE | Facility: CLINIC | Age: 68
End: 2023-08-03
Payer: MEDICARE

## 2023-08-03 VITALS
HEIGHT: 66 IN | WEIGHT: 260.8 LBS | DIASTOLIC BLOOD PRESSURE: 80 MMHG | BODY MASS INDEX: 41.91 KG/M2 | OXYGEN SATURATION: 99 % | TEMPERATURE: 96.6 F | HEART RATE: 68 BPM | SYSTOLIC BLOOD PRESSURE: 140 MMHG

## 2023-08-03 DIAGNOSIS — C09.9 RIGHT TONSILLAR SQUAMOUS CELL CARCINOMA (HCC): ICD-10-CM

## 2023-08-03 DIAGNOSIS — Z71.89 COUNSELING AND COORDINATION OF CARE: Primary | ICD-10-CM

## 2023-08-03 DIAGNOSIS — C78.7 METASTATIC COLON CANCER TO LIVER (HCC): Primary | ICD-10-CM

## 2023-08-03 DIAGNOSIS — C18.9 METASTATIC COLON CANCER TO LIVER (HCC): Primary | ICD-10-CM

## 2023-08-03 DIAGNOSIS — R63.0 POOR APPETITE: ICD-10-CM

## 2023-08-03 PROCEDURE — NC001 PR NO CHARGE: Performed by: COUNSELOR

## 2023-08-03 PROCEDURE — 99204 OFFICE O/P NEW MOD 45 MIN: CPT | Performed by: INTERNAL MEDICINE

## 2023-08-03 NOTE — PROGRESS NOTES
Palliative and Supportive Care   Kam Landrum 80 y.o. female [id number]    Assessment/Plan:  1. Metastatic colon cancer to liver ([address])    2. 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HCC)    3. Poor appetite      Symptoms - poor appetite; mild gnawing in the lower abdomen, intermittent  · For now, encourage to supplement meals with things like Ensure 1-2 a day. · Low threshold to trial short course of low dose decadron when necessary  · Will consider decadron 0.5mg OD am   · Recommend to consider MMJ for long term assistance with appetite, etc. Pamphlet provided. We can set up for certification once able  · Encourage to take the prilosec prescribed previously by GI  · No CRP at this time. Did discuss use of opioid in the future. Will start low and slow, as patient is opioid-naive, when necessary. · Will consider oxyIR 5mg PO q4H prn    Support/GOC/ACP  · We will discuss on next visit, when patient ready to talk  · She had 5 children, but only 4 knows what is happening with her. She lives with her one daughter. Follow up  · RTO in 4 weeks, with [address] SW    Controlled Substance Review    [place] PDMP or [place]  reviewed: No red flags were identified; safe to proceed with prescription. No records found    Requested Prescriptions      No prescriptions requested or ordered in this encounter     There are no discontinued medications. Representatives have queried the patient's controlled substance dispensing history in the Prescription Drug Monitoring Program in compliance with regulations before I have prescribed any controlled substances. The prescription history is consistent with prescribed therapy and our practice policies.       30 minutes were spent face to face with Kam Landrum with greater than 50% of the time spent in counseling or coordination of care including discussions of etiology of diagnosis, pathogenesis of diagnosis, diagnostic results, impression, and recommendations, risks and benefits of treatment, instructions for disease self management, treatment instructions, follow up requirements, risk factors and risk reduction of disease, patient and family counseling/involvement in care, compliance with treatment regimen and advanced care planning. All of the patient's questions were answered during this discussion. No follow-ups on file. Subjective:   Chief Complaint  New consultation for:  symptom management, goal of care assessment and decisional support, disease process education and discussion of prognosis, advance care planning, emotional support in the setting of serious illness  HPI     Raffy Gay is a 80 y.o. female with Hx of breast cancer s/p surgery and RT (2019) and now with 2 active cancers - metastatic colon cancer to the liver and R tonsillar squamous cell cancer. She is currently on FOLFOX with plans to address the [address] in the near future, possibly with definitive chemoradiation therapy, pending group consensus after tumor board discussion. Met with patient and her daughter. Introduced palliative medicine. She denies any significant symptoms at this time, except for some fatigue, loss of appetite, dysgeusia and mild intermittent gnawing lower abdominal pain since starting chemo. I gave her samples of strawberry Ensure and encourage to take them daily to supplement meals. We talked about trial of low dose steroids in the future if appropriate. Side effects explained. We also talked about cancer pain and management, discussed rationale of direct cancer cares in improving cancer pain and rationale of using multimodal pain regimen in assisting with pain control while waiting for effects of cancer cares. Thankfully, she has no crp to speak of yet. She said she has not been on opioids in the past.     I also recommended MMJ to use long term for appetite, energy, etc. Pamphlet provided. She said she lives with her one daughter.  She has 5 children in general -2 live in the area, 3 others live in other states. Only 4 of them know about her diagnosis. She started to get tearful when asked how she was doing. She stopped talking and engaging at that point. She also met with [facility] SW today. Please refer to her separate documentation for more details. The following portions of the medical history were reviewed: past medical history, problem list, medication list, and social history. Current Outpatient Medications:   •  anastrozole (ARIMIDEX) 1 mg tablet, Take 1 tablet (1 mg total) by mouth daily, Disp: 90 tablet, Rfl: 3  •  atorvastatin (LIPITOR) 40 mg tablet, Take 1 tablet (40 mg total) by mouth daily at bedtime, Disp: 30 tablet, Rfl: 2  •  lidocaine-prilocaine (EMLA) cream, Apply topically as needed for mild pain, Disp: 30 g, Rfl: 3  •  losartan (COZAAR) 50 mg tablet, Take 1 tablet (50 mg total) by mouth daily, Disp: 90 tablet, Rfl: 1  •  omeprazole (PriLOSEC) 20 mg delayed release capsule, Take 1 capsule (20 mg total) by mouth daily, Disp: 30 capsule, Rfl: 5  •  ondansetron (ZOFRAN) 8 mg tablet, Take 1 tablet (8 mg total) by mouth every 8 (eight) hours as needed for nausea or vomiting (Patient not taking: Reported on 8/3/2023), Disp: 30 tablet, Rfl: 3  •  polyethylene glycol (GOLYTELY) 4000 mL solution, Take 4,000 mL by mouth once for 1 dose, Disp: 4000 mL, Rfl: 0  •  prochlorperazine (COMPAZINE) 10 mg tablet, Take 1 tablet (10 mg total) by mouth every 6 (six) hours as needed for nausea or vomiting (Patient not taking: Reported on 8/3/2023), Disp: 30 tablet, Rfl: 3  No current facility-administered medications for this visit. Facility-Administered Medications Ordered in Other Visits:   •  alteplase (CATHFLO) injection 2 mg, 2 mg, Intracatheter, Q1MIN PRN, Rip Haddad DO  Review of Systems   Constitutional: Positive for activity change, appetite change, fatigue and unexpected weight change. HENT: Negative for trouble swallowing.     Respiratory: Negative for shortness of breath. Cardiovascular: Negative for chest pain. Gastrointestinal: Positive for abdominal pain. Negative for constipation, diarrhea, nausea and vomiting. Neurological: Negative for weakness. Psychiatric/Behavioral: Negative for sleep disturbance. The patient is not nervous/anxious. All other systems reviewed and are negative. All other systems negative    Objective:  Vital Signs  /80 (BP Location: Left arm, Patient Position: Sitting, Cuff Size: Large)   Pulse 68   Temp (!) 96.6 °F (35.9 °C) (Temporal)   Ht 5' 6" (1.676 m)   Wt 118 kg (260 lb 12.8 oz)   SpO2 99%   BMI 42.09 kg/m²    Physical Exam    Constitutional: Appears well-developed and well-nourished. Appears tired. Does not appear too sickly. Not toxic appearing. In no acute physical or emotional distress. Head: Normocephalic and atraumatic. Eyes: EOM are normal. No ocular discharge. No scleral icterus. Neck: (+) ~[id number] rubbery fixed mass in the R side of the neck, below the angle of the jaw. Not red, tender or warm to touch  Respiratory: Effort normal. No stridor. No respiratory distress. Gastrointestinal: No abdominal distension. Musculoskeletal: No edema. Neurological: Alert, oriented and appropriately conversant. Skin: No diaphoresis, no rashes seen on exposed areas of skin. Psychiatric: Displays a normal mood and affect.  Behavior, judgement and thought content appear normal.     Maria E Pollack MD  Palliative Medicine & Supportive Care  Internal Medicine  Available via [facility] Text  Office: [phone]  Fax: [phone]

## 2023-08-03 NOTE — PATIENT INSTRUCTIONS
PRESCRIPTION REFILL REMINDER:  All medication refills should be requested prior to [facility] on Friday. Any refill requests after noon on Friday would be addressed the following Monday. Please protect yourself from [facility].  = Wash your hands. Soap and water, or hand  with at least 60% alcohol, are both effective at killing the virus. = Wear a mask. This will help protect others from any virus particles you might spread. Your mouth and nose BOTH need to be covered. = Keep the distance. Keep 6 feet of distance from other people, even if they seem healthy. Keeping distance protects you from the other person's virus spread.    = Get a vaccine, and boost it. Three vaccines are approved for use in the [facility] for all adults. These vaccines do provide protection against all known variants, and the new 'bi-valent' booster is predicted to be more protective against Omicron variants.  + Pfizer is FDA approved for all persons age 11 and older.  + Terrye Bolus may be given to all person age 25 and older.  - We do NOT recommend [address] vaccine for our Palliative Care patients. - [facility] is no longer offering regular COVID vaccine clinics. You may ask your primary doctor for help and advice. = you may also visit the CDC's complete list of approved sites for new vaccines: Vaccines. gov - Vaccine Location Search Results . (You may also visit Vaccines. gov and search for 'Bi-valent booster' in your zip code.)  = [address] ([address]), [employer], [employer], [employer], and many [facility] locations ALL have shots.  + The CDC recommends booster shots on ALL vaccines for ALL adults. = Test to treat. If you have symptoms, get tested, and get treatment!   New drugs like Paxlovid can prevent you from ever having to go to the hospital , and may be covered completely by your insurance or special government programs.   - https://aspr.hhs.gov/TestToTreat/ Informacion en espanol sobre vacunas, de nos companeros de [facility] --  Alberto.dk    Check out Kite.ly for [place] data that are updated daily:    http://www.IZEA/     Global Epidemics. Org, from [facility] (OUTPATIENT CAMP[facility]), will give you Fsvmjy-eh-Pvhpcj information on virus cases and vaccination rates:    Https://globalepidemics. org/    Frequently Asked Questions about COVID, answered by [facility]    SecurityAd.es

## 2023-08-04 ENCOUNTER — CLINICAL SUPPORT (OUTPATIENT)
Dept: GENETICS | Facility: CLINIC | Age: 68
End: 2023-08-04
Payer: MEDICARE

## 2023-08-04 ENCOUNTER — PATIENT OUTREACH (OUTPATIENT)
Dept: HEMATOLOGY ONCOLOGY | Facility: CLINIC | Age: 68
End: 2023-08-04

## 2023-08-04 ENCOUNTER — DOCUMENTATION (OUTPATIENT)
Dept: GENETICS | Facility: CLINIC | Age: 68
End: 2023-08-04

## 2023-08-04 DIAGNOSIS — Z80.0 FAMILY HISTORY OF COLON CANCER: ICD-10-CM

## 2023-08-04 DIAGNOSIS — Z17.0 MALIGNANT NEOPLASM OF UPPER-OUTER QUADRANT OF RIGHT BREAST IN FEMALE, ESTROGEN RECEPTOR POSITIVE (HCC): ICD-10-CM

## 2023-08-04 DIAGNOSIS — C78.7 METASTATIC COLON CANCER TO LIVER (HCC): Primary | ICD-10-CM

## 2023-08-04 DIAGNOSIS — C50.411 MALIGNANT NEOPLASM OF UPPER-OUTER QUADRANT OF RIGHT BREAST IN FEMALE, ESTROGEN RECEPTOR POSITIVE (HCC): ICD-10-CM

## 2023-08-04 DIAGNOSIS — Z80.0 FAMILY HISTORY OF PANCREATIC CANCER: ICD-10-CM

## 2023-08-04 DIAGNOSIS — C18.9 METASTATIC COLON CANCER TO LIVER (HCC): Primary | ICD-10-CM

## 2023-08-04 PROCEDURE — 36415 COLL VENOUS BLD VENIPUNCTURE: CPT

## 2023-08-04 NOTE — PROGRESS NOTES
Pre-Test Genetic Counseling Consult Note    Patient Name: Isabela Mina   /Age: 1955/68 y.o. Referring Provider: Tawny Wallace DO    Date of Service: 2023  Genetic Counselor: Rosita Mireles MS, [facility]  Interpretation Services: None  [facility]    Carmencita Harmon was referred to the [address] [place] [address],[address] and Genetic Assessment Program due to her personal history of breast cancer, family history of colon and pancreatic cancer and recent diagnosis of colon and tonsil cancer. she presents today to discuss the possibility of a hereditary cancer syndrome, options for genetic testing, and implications for her and her family. Her daughter Padmini Garcia accompanied her to the appointment. Cancer History and Treatment:   Personal History:  Oncology History   Malignant neoplasm of right female breast ([address] [facility])   2018 Initial Diagnosis    Malignant neoplasm of right female breast ([address] [facility])     2018 Biopsy    Rt Breast US BX 1100 8cmfn, 4 passes 12g Marquee:  - Invasive breast carcinoma of no special type (ductal NST/invasive ductal carcinoma).   - grade 2  - %  - ID 95%  - HER2 negative     2018 Surgery    Right partial mastectomy and SLN biopsy:  Infiltrating ductal carcinoma, Grade 2/3, felt to be between 2.0 cm and 2.5 cm in greatest dimension  - No invasive tumor is seen at inked margins, but there is a focus of DCIS seen within approximately 1mm of the inked medial margin  - no LVI  - no LCIS  - 0/2 LN's  at least Stage IIA - pT2, pN0, cM0, G2.    - Dr Sarina Peacock     2018 -  Cancer Staged    Stage IIA - pT2, pN0, cM0, G2.       2019 Genomic Testing    Oncotype DX score: 13     2019 -  Hormone Therapy    anastrozole 1 mg daily as adjuvant endocrine therapy    - Dr Jami Schafer       2019 - 4/3/2019 Radiation    Course: C1    Plan ID Energy Fractions Dose per Fraction (cGy) Dose Correction (cGy) Total Dose Delivered (cGy) Elapsed Days   R Breast 10X/6X 25 / 25 200 0 5,000 40   R BRST BOOST 16E  /  200 0 1,000 7      Treatment dates:  C1: 2019 - 4/3/2019       Metastatic colon cancer to liver ([address])   2023 Initial Diagnosis    Metastatic colon cancer to liver ([address])     2023 -  Cancer Staged    Staging form: Colon and Rectum, AJCC 8th Edition  - Clinical stage from 2023: Stage Unknown (cTX, cNX, pM1) - Signed by Gloria Louise DO on 2023 -  Chemotherapy    alteplase (CATHFLO), 2 mg, Intracatheter, Every 1 Minute as needed, 1 of 12 cycles  fluorouracil (ADRUCIL), 895 mg, Intravenous, Once, 1 of 12 cycles  Administration: 900 mg (2023)  leucovorin calcium IVPB, 896 mg, Intravenous, Once, 1 of 12 cycles  Administration: 900 mg (2023)  oxaliplatin (ELOXATIN) chemo infusion, 85 mg/m2 = 190.4 mg, Intravenous, Once, 1 of 12 cycles  Administration: 190.4 mg (2023)  fluorouracil (ADRUCIL) ambulatory infusion Soln, 1,200 mg/m2/day = 5,375 mg, Intravenous, Over 46 hours, 1 of 12 cycles     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[address])   2023 Initial Diagnosis    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[address])     2023 -  Cancer Staged    Staging form: Pharynx - Oropharynx, AJCC 8th Edition  - Clinical stage from 2023: Stage III (cT1, cN3, cM0, p16+) - Signed by Rohini Funk MD on 2023  Stage prefix: Initial diagnosis             Screening Hx:   Breast:  Breast Imagin22  Breast Density: Scattered fibroglandular density     Gynecologic:  Ovaries and Uterus intact     Skin:  No current screening    Other screening: following for thyroid nodules    Medical and Surgical History  Pertinent surgical history:   Past Surgical History:   Procedure Laterality Date   • BREAST BIOPSY Right 2018    us guided bx cancer   • BREAST SURGERY     • ESOPHAGOSCOPY N/A 2023    Procedure: ESOPHAGOSCOPY;  Surgeon: Lisy Hernandez MD;  [facility] OR; Service: ENT   • IR BIOPSY LIVER MASS  6/27/2023   • IR PORT PLACEMENT  7/28/2023   •  [address] INCL FLUOR GDNCE DX W/CELL WASHG [facility] N/A 7/20/2023    Procedure: BRONCHOSCOPY;  Surgeon: Yaakov Quinn MD;  [facility]: [facility] Main OR;  Service: ENT   • SC LARYNGOSCOPY W/BIOPSY MICROSCOPE/TELESCOPE N/A 7/20/2023    Procedure: MICRODIRECT LARYNGOSCOPY WITH BIOPSY;  Surgeon: Yaakov Quinn MD;  Location: [facility] Main OR;  Service: ENT   • SC MASTECTOMY PARTIAL W/AXILLARY LYMPHADENECTOMY Right 12/20/2018    Procedure: ULTRASOUND LOCALIZED PARTIAL MASTECTOMY W/SENTINEL NODE BIOPSY POSS AXILLARY DISSECTION;  Surgeon: Yuri Egan MD;  Location: [facility] MAIN OR;  Service: General   • TUBAL LIGATION     • US GUIDED BREAST BIOPSY RIGHT COMPLETE Right 11/23/2018   • US GUIDED INJECTION FOR RESEARCH STUDY  12/20/2018   • US GUIDED INJECTION FOR RESEARCH STUDY  12/7/2018   • US GUIDED INJECTION FOR RESEARCH STUDY  5/3/2019   • US GUIDED LYMPH NODE BIOPSY RIGHT  5/26/2023      Pertinent medical history:  Past Medical History:   Diagnosis Date   • Anemia    • Arthritis    • Breast cancer ([address]) 12/17/2018   • Cancer ([address])     right breast   • GERD (gastroesophageal reflux disease)    • Hyperlipidemia    • Hypertension    • Obesity    • Stroke [facility])     TIA    • Transaminitis 9/22/2021         Other History:  Height:   Ht Readings from Last 1 Encounters:   08/03/23 5' 6" (1.676 m)     Weight:   Wt Readings from Last 1 Encounters:   08/03/23 118 kg (260 lb 12.8 oz)       Relevant Family History   Patient reports no Ashkenazi Orthodox ancestry. Maternal Family History:   Mother (d.63) history of colon cancer (Dx 62)  Female first cousin with history of cancer NOS  No other reported history of cancer    Paternal Family History:   Father (d.65) history of pancreatic cancer (dx 61) +tobacco, +etoh    Please refer to the scanned pedigree in the Media Tab for a complete family history     *All history is reported as provided by the patient; records are not available for review, except where indicated. Assessment:  We discussed sporadic, familial and hereditary cancer. We also discussed the many factors that influence our risk for cancer such as age, environmental exposures, lifestyle choices and family history. We reviewed the indications suggestive of a hereditary predisposition to cancer. Genetic testing is indicated for Maira based on the following criteria: Meets NCCN V2.2023 Testing Criteria for Pancreatic Cancer Susceptibility Genes: family history of a first-degree relative diagnosed with pancreatic cancer    The risks, benefits, and limitations of genetic testing were reviewed with the patient, as well as genetic discrimination laws, and possible test results (positive, negative, variants of uncertain significance) and their clinical implications. If positive for a mutation, options for managing cancer risk including increased surveillance, chemoprevention, and in some cases prophylactic surgery were discussed. Arlin Marrufo was informed that if a hereditary cancer syndrome was identified in her, first degree relatives (parents, siblings, and children) have a chance of also inheriting the condition. Genetic testing would allow for predictive genetic testing in other relatives, who may also be at risk depending on their degree of relation. Billing:  Based on Maira's primary insurance being Medicare and [facility] billing policy, she should expect an out of pocket cost of $0. Maira verbalized understanding. Plan: Patient decided to proceed with testing and provided consent.     Summary:     Sample Collection:  The patient's blood sample was drawn in the office on 8/4 by medical assistant Marcie Whitehead    Genetic Testing Preformed: [facility] Common Hereditary Cancers Panel +RNA (47 genes): APC, HOLLY, AXIN2, BARD1, BMPR1A, BRCA1, BRCA2, BRIP1, CDH1, CDK4, CDKN2A, CHEK2, CTNNA1, DICER1, EPCAM, GREM1, HOXB13, KIT, MEN1, MLH1, MSH2, MSH3, MSH6, MUTYH, NBN, NF1, NTHL1, PALB2, PDGFRA, PMS2, POLD1, POLE, PTEN, RAD50, RAD51C, RAD51D, SDHA, SDHB, SDHC, SDHD, SMAD4, SMARCA4, STK11, TP53, TSC1, TSC2, VHL    Result Call Information:  In the event that we need to reach Maira via telephone:  I confirmed the patient's mobile number on file as the best number to call with results  I confirmed with the patient that we can leave a voicemail on the provided numbers    Results take approximately 2-3 weeks to complete once test is started. Michael Berenice will be notified via Marquee once results are available. Additional recommendations for surveillance/medical management will be made pending genetic test results.

## 2023-08-04 NOTE — PROGRESS NOTES
Late Entry from 08/03/2023:    Palliative Outpatient Assessment Note    MSW appreciates the opportunity to provide patient and Jaki Gomez (daughter) with outpatient emotional support and guidance while they continue to receive medical attention from a Palliative provider. MSW completed an assessment of need which was completed with the patient and Jaki Gomez (daughter)    Relationship status: Single     Duration of relationship:     Name of significant other:     Children and Ages: Jaimee Score, Henrine Po, and [place]. Pets: Cat (Carli). Other important family information: Jordyn Lees is currently in senior care - unsure of duration. Living situation (place and with whom): The patient currently lives with Shawan Enriquez in an apartment (has an elevator). Patient's primary caregiver: Self    Any limitations: None at this time. Environmental concerns or barriers: None at this time.  history: None. Employment history/ Source of income: Patient receives SSI. Disability:     Concerns regarding literacy: None. Spirituality/ Muslim: Denominational.    Cultural information:     Mental Health and/or Drug and Alcohol history: None. Advanced Directives: Will discuss at next appointment. Patient's strengths, social supports, and resources: Family. Patient/family current level of coping: Fair. Level of understanding: The patient and family have a good understanding of diagnosis and treatment. Patient/family concerns and areas of need: The patient reports that she is sleeping a lot but that she is sleeping well. The patient expressed that she is not eating well due to stomach aches. The patient begins chemotherapy in two weeks and believes the duration will be around three hours. The patient currently has a cane for support and is already signed up for [facility] transport if needed. The patient reports no financial concerns at this time.  The patient became tearful towards the end of the visit but remained guarded. The patient stated that she did not wish to process anything at this time but did relay that she enjoys playing card games/games on her iPad. MSW encouraged the patient to bring a set of cards to her treatments in addition to her iPad if appropriate. I have spent 25 minutes with the patient and daughter Dorota Gore) today in which greater than 50% of this time was spent in counseling/coordination of care regarding Ongoing Emotional Support.

## 2023-08-04 NOTE — PROGRESS NOTES
Pt called to confirm she is set up with [facility] for her scheduled treatments. E mail sent to [facility] and e mail received back.  Pt is scheduled with [facility] for all her treatment appointments, and also the barium swallow scheduled on 8/15/2023    Called pt back and confirmed all of this with her, she was thankful for the call

## 2023-08-04 NOTE — LETTER
2023     Duc Lewis Johnson [address] [facility]  [place] [address]    Patient: Alma Delia Goyal  YOB: 1955  Date of Visit: 2023      Dear Dr. Jose Alejandro Banks: Thank you for referring Alma Delia Goyal to me for evaluation. Below are my notes for this consultation. If you have questions, please do not hesitate to call me. I look forward to following your patient along with you. Sincerely,        Dewey Almanzar        CC: Carolina Carroll MD        Pre-Test Genetic Counseling Consult Note    Patient Name: Alma Delia Goyal   /Age: 1955/68 y.o. Referring Provider: Elsie Roth DO    Date of Service: 2023  Genetic Counselor: Dewey Almanzar MS, [facility]  Interpretation Services: None  [facility]    Katharine Lackey was referred to the [address] [place], [place] [address] and Genetic Assessment Program due to her personal history of breast cancer, family history of colon and pancreatic cancer and recent diagnosis of colon and tonsil cancer. she presents today to discuss the possibility of a hereditary cancer syndrome, options for genetic testing, and implications for her and her family. Her daughter Will Marshall accompanied her to the appointment. Cancer History and Treatment:   Personal History:  Oncology History   Malignant neoplasm of right female breast ([address] [facility])   2018 Initial Diagnosis    Malignant neoplasm of right female breast ([facility])     2018 Biopsy    Rt Breast US BX 1100 8cmfn, 4 passes 12g Shiraz:  - Invasive breast carcinoma of no special type (ductal NST/invasive ductal carcinoma).   - grade 2  - %  - NM 95%  - HER2 negative     2018 Surgery    Right partial mastectomy and SLN biopsy:  Infiltrating ductal carcinoma, Grade 2/3, felt to be between 2.0 cm and 2.5 cm in greatest dimension  - No invasive tumor is seen at inked margins, but there is a focus of DCIS seen within approximately 1mm of the inked medial margin  - no LVI  - no LCIS  - 0/2 LN's  at least Stage IIA - pT2, pN0, cM0, G2.    - Dr Farida Schmidt     12/20/2018 -  Cancer Staged    Stage IIA - pT2, pN0, cM0, G2.       1/4/2019 Genomic Testing    Oncotype DX score: 13     2/1/2019 -  Hormone Therapy    anastrozole 1 mg daily as adjuvant endocrine therapy    - Dr John Marshall       2/14/2019 - 4/3/2019 Radiation    Course: C1    Plan ID Energy Fractions Dose per Fraction (cGy) Dose Correction (cGy) Total Dose Delivered (cGy) Elapsed Days   R Breast 10X/6X 25 / 25 200 0 5,000 40   R BRST BOOST 16E 5 / 5 200 0 1,000 7      Treatment dates:  C1: 2/14/2019 - 4/3/2019       Metastatic colon cancer to liver ([address])   7/11/2023 Initial Diagnosis    Metastatic colon cancer to liver ([address])     7/13/2023 -  Cancer Staged    Staging form: Colon and Rectum, AJCC 8th Edition  - Clinical stage from 7/13/2023: Stage Unknown (cTX, cNX, pM1) - Signed by Sita Ray DO on 7/13/2023 7/31/2023 -  Chemotherapy    alteplase (CATHFLO), 2 mg, Intracatheter, Every 1 Minute as needed, 1 of 12 cycles  fluorouracil (ADRUCIL), 895 mg, Intravenous, Once, 1 of 12 cycles  Administration: 900 mg (7/31/2023)  leucovorin calcium IVPB, 896 mg, Intravenous, Once, 1 of 12 cycles  Administration: 900 mg (7/31/2023)  oxaliplatin (ELOXATIN) chemo infusion, 85 mg/m2 = 190.4 mg, Intravenous, Once, 1 of 12 cycles  Administration: 190.4 mg (7/31/2023)  fluorouracil (ADRUCIL) ambulatory infusion Soln, 1,200 mg/m2/day = 5,375 mg, Intravenous, Over 46 hours, 1 of 12 cycles     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[address])   7/27/2023 Initial Diagnosis    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[address])     7/27/2023 -  Cancer Staged    Staging form: Pharynx - Oropharynx, AJCC 8th Edition  - Clinical stage from 7/27/2023: Stage III (cT1, cN3, cM0, p16+) - Signed by Aspen Zelaya MD on 7/27/2023  Stage prefix: Initial diagnosis             Screening Hx: Breast:  Breast Imagin22  Breast Density: Scattered fibroglandular density     Gynecologic:  Ovaries and Uterus intact     Skin:  No current screening    Other screening: following for thyroid nodules    Medical and Surgical History  Pertinent surgical history:   Past Surgical History:   Procedure Laterality Date   • BREAST BIOPSY Right 2018    us guided bx cancer   • BREAST SURGERY     • ESOPHAGOSCOPY N/A 2023    Procedure: ESOPHAGOSCOPY;  Surgeon: Victorino Mejia MD;  [facility];  Service: ENT   • IR BIOPSY LIVER MASS  2023   • IR PORT PLACEMENT  2023   •  [address] INCL FLUOR GDNCE DX W/CELL WASHG [address] [facility] N/A 2023    Procedure: BRONCHOSCOPY;  Surgeon: Victorino Mejia MD;  [facility];  Service: ENT   • NJ LARYNGOSCOPY W/BIOPSY MICROSCOPE/TELESCOPE N/A 2023    Procedure: MICRODIRECT LARYNGOSCOPY WITH BIOPSY;  Surgeon: Victorino Mejia MD;  Location: [facility] OR;  Service: ENT   • NJ MASTECTOMY PARTIAL W/AXILLARY LYMPHADENECTOMY Right 2018    Procedure: ULTRASOUND LOCALIZED PARTIAL MASTECTOMY W/SENTINEL NODE BIOPSY POSS AXILLARY DISSECTION;  Surgeon: Colin Powers MD;  Location: [address] [place] [address] MAIN OR;  Service: General   • TUBAL LIGATION     • US GUIDED BREAST BIOPSY RIGHT COMPLETE Right 2018   • [facility] STUDY  2018   • [facility] STUDY  2018   • [facility] STUDY  5/3/2019   • US GUIDED LYMPH NODE BIOPSY RIGHT  2023      Pertinent medical history:  Past Medical History:   Diagnosis Date   • Anemia    • Arthritis    • Breast cancer ([address]) 2018   • Cancer ([address])     right breast   • GERD (gastroesophageal reflux disease)    • Hyperlipidemia    • Hypertension    • Obesity    • Stroke [facility])     TIA    • Transaminitis 2021         Other History:  Height:   Ht Readings from Last 1 Encounters:   23 5' 6" (1.676 m)     Weight:   Wt Readings from Last 1 Encounters:   08/03/23 118 kg (260 lb 12.8 oz)       Relevant Family History   Patient reports no Ashkenazi Church ancestry. Maternal Family History: Mother (d.63) history of colon cancer (Dx 62)  Female first cousin with history of cancer NOS  No other reported history of cancer    Paternal Family History:   Father (d.65) history of pancreatic cancer (dx 61) +tobacco, +etoh    Please refer to the scanned pedigree in the Media Tab for a complete family history     *All history is reported as provided by the patient; records are not available for review, except where indicated. Assessment:  We discussed sporadic, familial and hereditary cancer. We also discussed the many factors that influence our risk for cancer such as age, environmental exposures, lifestyle choices and family history. We reviewed the indications suggestive of a hereditary predisposition to cancer. Genetic testing is indicated for Maira based on the following criteria: Meets NCCN V2.2023 Testing Criteria for Pancreatic Cancer Susceptibility Genes: family history of a first-degree relative diagnosed with pancreatic cancer    The risks, benefits, and limitations of genetic testing were reviewed with the patient, as well as genetic discrimination laws, and possible test results (positive, negative, variants of uncertain significance) and their clinical implications. If positive for a mutation, options for managing cancer risk including increased surveillance, chemoprevention, and in some cases prophylactic surgery were discussed. George Ca was informed that if a hereditary cancer syndrome was identified in her, first degree relatives (parents, siblings, and children) have a chance of also inheriting the condition. Genetic testing would allow for predictive genetic testing in other relatives, who may also be at risk depending on their degree of relation.      Billing:  Based on Maira's primary insurance being Medicare and [facility] billing policy, she should expect an out of pocket cost of $0. Maira verbalized understanding. Plan: Patient decided to proceed with testing and provided consent. Summary:     Sample Collection:  The patient's blood sample was drawn in the office on 8/4 by medical assistant Sarah Meadows    Genetic Testing Preformed: [facility] Common Hereditary Cancers Panel +RNA (47 genes): APC, HOLLY, AXIN2, BARD1, BMPR1A, BRCA1, BRCA2, BRIP1, CDH1, CDK4, CDKN2A, CHEK2, CTNNA1, DICER1, EPCAM, GREM1, HOXB13, KIT, MEN1, MLH1, MSH2, MSH3, MSH6, MUTYH, NBN, NF1, NTHL1, PALB2, PDGFRA, PMS2, POLD1, POLE, PTEN, RAD50, RAD51C, RAD51D, SDHA, SDHB, SDHC, SDHD, SMAD4, SMARCA4, STK11, TP53, TSC1, TSC2, VHL    Result Call Information:  In the event that we need to reach Maira via telephone:  I confirmed the patient's mobile number on file as the best number to call with results  I confirmed with the patient that we can leave a voicemail on the provided numbers    Results take approximately 2-3 weeks to complete once test is started. Lilia Ramirez will be notified via Trusteer once results are available. Additional recommendations for surveillance/medical management will be made pending genetic test results.

## 2023-08-07 NOTE — TELEPHONE ENCOUNTER
Second attempt made today to reach Maira to establish care and discuss her nutrition. Spoke with Lilia Ramirez and introduced self. Discussed oncology nutrition services available to her. She would like to schedule an appointment with RD to discuss her nutrition. Initial RD appointment scheduled for 8/17/23 at 8 am via phone. Provided RD contact information and encouraged her to reach out as needed in the meantime.

## 2023-08-08 ENCOUNTER — TELEPHONE (OUTPATIENT)
Dept: HEMATOLOGY ONCOLOGY | Facility: CLINIC | Age: 68
End: 2023-08-08

## 2023-08-08 ENCOUNTER — OFFICE VISIT (OUTPATIENT)
Dept: DENTISTRY | Facility: CLINIC | Age: 68
End: 2023-08-08

## 2023-08-08 VITALS — DIASTOLIC BLOOD PRESSURE: 84 MMHG | SYSTOLIC BLOOD PRESSURE: 152 MMHG

## 2023-08-08 DIAGNOSIS — Z01.21 ENCOUNTER FOR DENTAL EXAMINATION AND CLEANING WITH ABNORMAL FINDINGS: Primary | ICD-10-CM

## 2023-08-08 PROCEDURE — D0230 INTRAORAL - PERIAPICAL EACH ADDITIONAL RADIOGRAPHIC IMAGE: HCPCS

## 2023-08-08 PROCEDURE — D0150 COMPREHENSIVE ORAL EVALUATION - NEW OR ESTABLISHED PATIENT: HCPCS

## 2023-08-08 PROCEDURE — D0220 INTRAORAL - PERIAPICAL FIRST RADIOGRAPHIC IMAGE: HCPCS

## 2023-08-08 PROCEDURE — D0330 PANORAMIC RADIOGRAPHIC IMAGE: HCPCS

## 2023-08-08 NOTE — TELEPHONE ENCOUNTER
Spoke with patient regarding her missed appointment today. Patient stated that she had a dentist appointment and went to take a nap afterwards and didn't wake up in time. Patient is scheduled to see Edita Bradley tomorrow at 1pm. Patient confirmed.

## 2023-08-08 NOTE — DENTAL PROCEDURE DETAILS
Myrtle James presents for a NP exam and clearance for radiation rt. tonsil. Verbal consent for treatment given in addition to the forms. Reviewed health history   Consents signed: Yes  ASA III    2 pas, Panorex, comprehensive exam, periocharting  Pt has 6 lower teeth remaining and wears FUD. Dr Héctor Carvalho examined: did not approve clearance for radiation due to existing poor oral hygiene and decay present. OCS (-). Discussed with patient if she wants lower partial-pt does want. #2 MB root decay. #22 is stable. #23 dbl root caries. #27 f recurrent decay. #28 d root decay. Dental clearance form signed by Dr. Sasha Coates, Attending. Pt left office satisfied with apt.      NV: prophy  Rest #27  Ext #23,21  Impressions/discuss trt plan to replace missing lower teeth

## 2023-08-08 NOTE — TELEPHONE ENCOUNTER
Left VM for patient regarding her missed appointment. I left my direct teams number for her to callback.

## 2023-08-09 ENCOUNTER — TELEPHONE (OUTPATIENT)
Age: 68
End: 2023-08-09

## 2023-08-09 ENCOUNTER — DOCUMENTATION (OUTPATIENT)
Dept: HEMATOLOGY ONCOLOGY | Facility: CLINIC | Age: 68
End: 2023-08-09

## 2023-08-09 DIAGNOSIS — C78.7 METASTATIC COLON CANCER TO LIVER (HCC): Primary | ICD-10-CM

## 2023-08-09 DIAGNOSIS — C18.9 METASTATIC COLON CANCER TO LIVER (HCC): Primary | ICD-10-CM

## 2023-08-09 RX ORDER — FLUOROURACIL 50 MG/ML
900 INJECTION, SOLUTION INTRAVENOUS ONCE
Status: CANCELLED | OUTPATIENT
Start: 2023-08-14

## 2023-08-09 RX ORDER — DEXTROSE MONOHYDRATE 50 MG/ML
20 INJECTION, SOLUTION INTRAVENOUS ONCE
Status: CANCELLED | OUTPATIENT
Start: 2023-08-14

## 2023-08-09 RX ORDER — SODIUM CHLORIDE 9 MG/ML
20 INJECTION, SOLUTION INTRAVENOUS ONCE AS NEEDED
Status: CANCELLED | OUTPATIENT
Start: 2023-08-14

## 2023-08-09 NOTE — TELEPHONE ENCOUNTER
Left  for patient explaining that Dr. Nando Courtney needs to see her prior to cycle 2 instead of her nurse practitioner Tony Romo. I explained that we have an available appointment on Friday at 0900. I informed her that I am aware she has lab draws at the same time at [facility] and I will have them schedule these for later in the day. I left my direct teams number for her to callback if she had any questions or concerns.

## 2023-08-09 NOTE — PROGRESS NOTES
HEAD AND NECK MULTIDISCIPLINARY CASE REVIEW    DATE:  8/9/23      PRESENTING DOCTOR:  Dr. 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 female who was presented at the [facility] and Neck Oncology Multidisciplinary Tumor Conference today. She has a history of prior breast cancer s/p BCS and whole breast RT in April 2019, with recent diagnosis of synchronous metastatic colon cancer (M1) as well as right tonsillar cancer ([id number]). PET/CT on 6/19/23 demonstrated a large right level 2A lymph node measuring 4.7cm with nodular FDG activity along the right tonsil with soft tissue measuring 1.5cm. There are mildly prominent borderline enlarged mildly FDG avid left cervical lymph nodes suspicious for developing left-sided claudio disease. Other findings on her PET scan include a RLL nodule suspicious for hypermetabolic disease, liver metastases, and focal hypermetabolic mural thickening of the proximal colon. Biopsy of the right level IIA node showed suspicions for malignancy, with DL and right tonsillar biopsy on 7/20/2023 showing p16 positive invasive squamous cell carcinoma. Liver biopsy on 6/27/23 showed metastatic adenocarcinoma suggestive of colorectal primary. Colon biopsy showed well-differentiated adenocarcinoma. PHYSICIAN RECOMMENDED PLAN:  Continue treatment for metastatic colon cancer for 3 months followed by repeat imaging to determine treatment plan for her H/N cancer. Imaging reviewed:  6/19/23 PET/CT-Multifocal hypermetabolic malignancy/metastatic disease involving the neck, abdomen, and likely chest.     3/30/23 CT soft tissue    Pathology reviewed:  No     Referrals:  None needed at this time. Procedures:  N/A    Team agreed to plan.   NCCN guidelines were readily available for review at this discussion    The final treatment plan will be left to the discretion of the patient and the treating physician. DISCLAIMERS:  TO THE TREATING PHYSICIAN:  This conference is a meeting of clinicians from various specialty areas who evaluate and discuss patients for whom a multidisciplinary treatment approach is being considered. Please note that the above opinion was a consensus of the conference attendees and is intended only to assist in quality care of the discussed patient. The responsibility for follow up on the input given during the conference, along with any final decisions regarding plan of care, is that of the patient and the patient's provider. Accordingly, appointments have only been recommended based on this information and have NOT been scheduled unless otherwise noted. TO THE PATIENT:  This summary is a brief record of major aspects of your cancer treatment. You may choose to share a copy with any of your doctors or nurses. However, this is not a detailed or comprehensive record of your care.

## 2023-08-11 ENCOUNTER — HOSPITAL ENCOUNTER (OUTPATIENT)
Dept: INFUSION CENTER | Facility: CLINIC | Age: 68
Discharge: HOME | End: 2023-08-11
Payer: MEDICARE

## 2023-08-11 ENCOUNTER — OFFICE VISIT (OUTPATIENT)
Dept: HEMATOLOGY ONCOLOGY | Facility: CLINIC | Age: 68
End: 2023-08-11
Payer: MEDICARE

## 2023-08-11 VITALS
WEIGHT: 262 LBS | RESPIRATION RATE: 17 BRPM | HEART RATE: 67 BPM | HEIGHT: 66 IN | OXYGEN SATURATION: 96 % | TEMPERATURE: 97.3 F | BODY MASS INDEX: 42.11 KG/M2 | SYSTOLIC BLOOD PRESSURE: 148 MMHG | DIASTOLIC BLOOD PRESSURE: 90 MMHG

## 2023-08-11 VITALS — TEMPERATURE: 96.9 F

## 2023-08-11 DIAGNOSIS — C78.7 METASTATIC COLON CANCER TO LIVER (HCC): Primary | ICD-10-CM

## 2023-08-11 DIAGNOSIS — C09.9 RIGHT TONSILLAR SQUAMOUS CELL CARCINOMA (HCC): ICD-10-CM

## 2023-08-11 DIAGNOSIS — C18.9 METASTATIC COLON CANCER TO LIVER (HCC): Primary | ICD-10-CM

## 2023-08-11 LAB
ALBUMIN SERPL BCP-MCNC: 3.3 G/DL (ref 3.5–5)
ALP SERPL-CCNC: 94 U/L (ref 34–104)
ALT SERPL W P-5'-P-CCNC: 43 U/L (ref 7–52)
ANION GAP SERPL CALCULATED.3IONS-SCNC: 8 MMOL/L
AST SERPL W P-5'-P-CCNC: 39 U/L (ref 13–39)
BASOPHILS # BLD AUTO: 0.03 THOUSANDS/ÂΜL (ref 0–0.1)
BASOPHILS NFR BLD AUTO: 1 % (ref 0–1)
BILIRUB SERPL-MCNC: 0.35 MG/DL (ref 0.2–1)
BUN SERPL-MCNC: 12 MG/DL (ref 5–25)
CALCIUM ALBUM COR SERPL-MCNC: 9.1 MG/DL (ref 8.3–10.1)
CALCIUM SERPL-MCNC: 8.5 MG/DL (ref 8.4–10.2)
CHLORIDE SERPL-SCNC: 106 MMOL/L (ref 96–108)
CO2 SERPL-SCNC: 25 MMOL/L (ref 21–32)
CREAT SERPL-MCNC: 0.58 MG/DL (ref 0.6–1.3)
EOSINOPHIL # BLD AUTO: 0.32 THOUSAND/ÂΜL (ref 0–0.61)
EOSINOPHIL NFR BLD AUTO: 6 % (ref 0–6)
ERYTHROCYTE [DISTWIDTH] IN BLOOD BY AUTOMATED COUNT: 15 % (ref 11.6–15.1)
GFR SERPL CREATININE-BSD FRML MDRD: 95 ML/MIN/1.73SQ M
GLUCOSE SERPL-MCNC: 99 MG/DL (ref 65–140)
HCT VFR BLD AUTO: 27.5 % (ref 34.8–46.1)
HGB BLD-MCNC: 8.7 G/DL (ref 11.5–15.4)
IMM GRANULOCYTES # BLD AUTO: 0.02 THOUSAND/UL (ref 0–0.2)
IMM GRANULOCYTES NFR BLD AUTO: 0 % (ref 0–2)
LYMPHOCYTES # BLD AUTO: 1.93 THOUSANDS/ÂΜL (ref 0.6–4.47)
LYMPHOCYTES NFR BLD AUTO: 36 % (ref 14–44)
MCH RBC QN AUTO: 25.9 PG (ref 26.8–34.3)
MCHC RBC AUTO-ENTMCNC: 31.6 G/DL (ref 31.4–37.4)
MCV RBC AUTO: 82 FL (ref 82–98)
MONOCYTES # BLD AUTO: 0.38 THOUSAND/ÂΜL (ref 0.17–1.22)
MONOCYTES NFR BLD AUTO: 7 % (ref 4–12)
NEUTROPHILS # BLD AUTO: 2.67 THOUSANDS/ÂΜL (ref 1.85–7.62)
NEUTS SEG NFR BLD AUTO: 50 % (ref 43–75)
NRBC BLD AUTO-RTO: 0 /100 WBCS
PLATELET # BLD AUTO: 202 THOUSANDS/UL (ref 149–390)
PMV BLD AUTO: 10.9 FL (ref 8.9–12.7)
POTASSIUM SERPL-SCNC: 3 MMOL/L (ref 3.5–5.3)
PROT SERPL-MCNC: 6.5 G/DL (ref 6.4–8.4)
RBC # BLD AUTO: 3.36 MILLION/UL (ref 3.81–5.12)
SODIUM SERPL-SCNC: 139 MMOL/L (ref 135–147)
WBC # BLD AUTO: 5.35 THOUSAND/UL (ref 4.31–10.16)

## 2023-08-11 PROCEDURE — 80053 COMPREHEN METABOLIC PANEL: CPT | Performed by: INTERNAL MEDICINE

## 2023-08-11 PROCEDURE — 99214 OFFICE O/P EST MOD 30 MIN: CPT | Performed by: INTERNAL MEDICINE

## 2023-08-11 PROCEDURE — 85025 COMPLETE CBC W/AUTO DIFF WBC: CPT | Performed by: INTERNAL MEDICINE

## 2023-08-11 PROCEDURE — 36593 DECLOT VASCULAR DEVICE: CPT

## 2023-08-11 RX ORDER — FLUOROURACIL 50 MG/ML
400 INJECTION, SOLUTION INTRAVENOUS ONCE
OUTPATIENT
Start: 2023-09-11

## 2023-08-11 RX ORDER — DEXTROSE MONOHYDRATE 50 MG/ML
20 INJECTION, SOLUTION INTRAVENOUS ONCE
OUTPATIENT
Start: 2023-08-28

## 2023-08-11 RX ORDER — FLUOROURACIL 50 MG/ML
400 INJECTION, SOLUTION INTRAVENOUS ONCE
OUTPATIENT
Start: 2023-08-28

## 2023-08-11 RX ORDER — SODIUM CHLORIDE 9 MG/ML
20 INJECTION, SOLUTION INTRAVENOUS ONCE AS NEEDED
OUTPATIENT
Start: 2023-08-28

## 2023-08-11 RX ORDER — SODIUM CHLORIDE 9 MG/ML
20 INJECTION, SOLUTION INTRAVENOUS ONCE AS NEEDED
OUTPATIENT
Start: 2023-09-11

## 2023-08-11 RX ORDER — DEXTROSE MONOHYDRATE 50 MG/ML
20 INJECTION, SOLUTION INTRAVENOUS ONCE
OUTPATIENT
Start: 2023-09-11

## 2023-08-11 RX ADMIN — ALTEPLASE 2 MG: 2.2 INJECTION, POWDER, LYOPHILIZED, FOR SOLUTION INTRAVENOUS at 13:21

## 2023-08-11 NOTE — PROGRESS NOTES
HEMATOLOGY / [facility] FOLLOW UP NOTE    Primary Care Provider: Louisa Sidhu MD  Referring Provider:    MRN: [id number]  : 1955    Reason for Encounter: follow up    HPI   In summary, 79 yo F with PMH of breast cancer, now on anastrozole maintenance, in 2023 she was  diagnosed with stage IV colon cancer with mets to liver and found to have Squamous cell carcinoma R tonsil, - as outlined below ; was started on FOLFOX, 1st cycle , presenting today to follow up     Interval History:   -Patient tolerated the first cycle of FOLFOX without any reaction or adverse effects except some constipation which is well managed with her Dulcolax PO , advised to try PRN Miralax ; she follows up with Palliative Care, input appreciated. -Genetic testing Caris reviewed, as listed below, with the patient, possible benefits and risks of adding Bevacizumab to FOLFOX, and patient chose to defer for now. - the R neck mass is at least stable if not slightly less than before, measured 4.5 x 5 cm approximately today; and patient denies any dysphagia, odynophagia or difficulty breathing.     - she already met with genetic counseling. Assessment and Plan:   1. Metastatic colon cancer to liver ([address])  2. 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HCC)  - will continue with FOLFOX ; patient chose to defer adding Bevacizumab as above. - considering re-imaging after 3 cycles of chemo.   - Radiation may be considered down the road for her Rt tonsil mass. - to follow up prior to cycle 3 & cycle 5 for further eval and management. Oncology History   Malignant neoplasm of right female breast ([address])   2018 Initial Diagnosis    Malignant neoplasm of right female breast ([address])     2018 Biopsy    Rt Breast US BX 1100 8cmfn, 4 passes 12g Marquee:  - Invasive breast carcinoma of no special type (ductal NST/invasive ductal carcinoma).   - grade 2  - %  - AR 95%  - HER2 negative     2018 Surgery    Right partial mastectomy and SLN biopsy:  Infiltrating ductal carcinoma, Grade 2/3, felt to be between 2.0 cm and 2.5 cm in greatest dimension  - No invasive tumor is seen at inked margins, but there is a focus of DCIS seen within approximately 1mm of the inked medial margin  - no LVI  - no LCIS  - 0/2 LN's  at least Stage IIA - pT2, pN0, cM0, G2.    - Dr Zamzam Shaikh     12/20/2018 -  Cancer Staged    Stage IIA - pT2, pN0, cM0, G2.       1/4/2019 Genomic Testing    Oncotype DX score: 13     2/1/2019 -  Hormone Therapy    anastrozole 1 mg daily as adjuvant endocrine therapy    - Dr Harris Sexton       2/14/2019 - 4/3/2019 Radiation    Course: C1    Plan ID Energy Fractions Dose per Fraction (cGy) Dose Correction (cGy) Total Dose Delivered (cGy) Elapsed Days   R Breast 10X/6X 25 / 25 200 0 5,000 40   R BRST BOOST 16E 5 / 5 200 0 1,000 7      Treatment dates:  C1: 2/14/2019 - 4/3/2019       Metastatic colon cancer to liver ([address])   7/6/2023 Genetic Testing    CARIS Results:   • KRAS Pathogenic Variant Exon 2  p.G12D : lack of benefit of cetuximab, panitumumab Level 2   • No other actionable mutation; MSI stable; TMB low   • MiFOLOXAi Results: "Decreased Benefit of FOLFOX + Bevacizumab in first line metastatic CRC. This patient may achieve improved results by receiving an alternative to FOLFOX, such as FOLFIRI, as their initial regimen. As an adjustment to frontline FOLFOXIRI following toxicity: This patient may achieve improved results by removing the oxaliplatin portion of their regimen".          7/11/2023 Initial Diagnosis    Metastatic colon cancer to liver ([address])     7/13/2023 -  Cancer Staged    Staging form: Colon and Rectum, AJCC 8th Edition  - Clinical stage from 7/13/2023: Stage Unknown (cTX, cNX, pM1) - Signed by Renee Aguayo DO on 7/13/2023 7/31/2023 -  Chemotherapy    alteplase (CATHFLO), 2 mg, Intracatheter, Every 1 Minute as needed, 1 of 12 cycles  fluorouracil (ADRUCIL), 895 mg, Intravenous, Once, 1 of 12 cycles  Administration: 900 mg (7/31/2023)  leucovorin calcium IVPB, 896 mg, Intravenous, Once, 1 of 12 cycles  Administration: 900 mg (7/31/2023)  oxaliplatin (ELOXATIN) chemo infusion, 85 mg/m2 = 190.4 mg, Intravenous, Once, 1 of 12 cycles  Administration: 190.4 mg (7/31/2023)  fluorouracil (ADRUCIL) ambulatory infusion Soln, 1,200 mg/m2/day = 5,375 mg, Intravenous, Over 46 hours, 1 of 12 cycles     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[address])   7/27/2023 Initial Diagnosis    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[address])     7/27/2023 -  Cancer Staged    Staging form: Pharynx - Oropharynx, AJCC 8th Edition  - Clinical stage from 7/27/2023: Stage III (cT1, cN3, cM0, p16+) - Signed by Aspen Zelaya MD on 7/27/2023  Stage prefix: Initial diagnosis           Interval History: Patient presents for follow up of          REVIEW OF SYSTEMS:    Positive for new constipation after the chemo cycle 1 . Well managed with PO Dulcolax;     Please note that a 14-point review of systems was performed to include Constitutional, HEENT, Respiratory, CVS, GI, , Musculoskeletal, Integumentary, Neurologic, Rheumatologic, Endocrinologic, Psychiatric, Lymphatic, and Hematologic/Oncologic systems were reviewed and are negative unless otherwise stated in HPI. Positive and negative findings pertinent to this evaluation are incorporated into the history of present illness.       ECOG PS: 0    PROBLEM LIST:  Patient Active Problem List   Diagnosis   • Primary hypertension   • Vasomotor rhinitis   • Mixed hyperlipidemia   • Screen for colon cancer   • Malignant neoplasm of right female breast ([address])   • Vitamin D deficiency   • Encounter for screening for HIV   • Prediabetes   • Cervical lymphadenopathy   • Follow-up examination   • Right thyroid nodule   • GERD (gastroesophageal reflux disease)   • Stroke [facility])   • Obesity   • Metastatic colon cancer to liver [facility])   • 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[address])   • Poor appetite            I spent 45 minutes on chart review, face to face counseling time, coordination of care and documentation. Past Medical History:   has a past medical history of Anemia, Arthritis, Breast cancer ([address]) (12/17/2018), Cancer ([address]), GERD (gastroesophageal reflux disease), Hyperlipidemia, Hypertension, Obesity, Stroke ([address]), and Transaminitis (09/22/2021). PAST SURGICAL HISTORY:   has a past surgical history that includes US guided breast biopsy right complete (Right, 11/23/2018); Breast surgery; pr mastectomy partial w/axillary lymphadenectomy (Right, 12/20/2018); Tubal ligation; Breast biopsy (Right, 11/23/2018); US guided injection for research study (12/20/2018); US guided injection for research study (12/7/2018); US guided injection for research study (5/3/2019); US guided lymph node biopsy right (5/26/2023); IR biopsy liver mass (6/27/2023); pr laryngoscopy w/biopsy microscope/telescope (N/A, 7/20/2023); pr [facility] incl fluor gdnce dx w/cell washg spx (N/A, 7/20/2023); ESOPHAGOSCOPY (N/A, 7/20/2023); and IR port placement (7/28/2023).     CURRENT MEDICATIONS  Current Outpatient Medications   Medication Sig Dispense Refill   • anastrozole (ARIMIDEX) 1 mg tablet Take 1 tablet (1 mg total) by mouth daily 90 tablet 3   • atorvastatin (LIPITOR) 40 mg tablet Take 1 tablet (40 mg total) by mouth daily at bedtime 30 tablet 2   • [START ON 8/14/2023] fluorouracil 5,375 mg in CADD/Elastomeric Infusion Device Infuse 5,375 mg (1,200 mg/m2/day x 2.24 m2) into a catheter in a vein via infusion device over 46 hours for 2 days  Infusion planned for August 14, 2023. 1 each 0   • lidocaine-prilocaine (EMLA) cream Apply topically as needed for mild pain 30 g 3   • losartan (COZAAR) 50 mg tablet Take 1 tablet (50 mg total) by mouth daily 90 tablet 1   • omeprazole (PriLOSEC) 20 mg delayed release capsule Take 1 capsule (20 mg total) by mouth daily 30 capsule 5   • ondansetron (ZOFRAN) 8 mg tablet Take 1 tablet (8 mg total) by mouth every 8 (eight) hours as needed for nausea or vomiting 30 tablet 3   • polyethylene glycol (GOLYTELY) 4000 mL solution Take 4,000 mL by mouth once for 1 dose 4000 mL 0   • prochlorperazine (COMPAZINE) 10 mg tablet Take 1 tablet (10 mg total) by mouth every 6 (six) hours as needed for nausea or vomiting (Patient not taking: Reported on 8/3/2023) 30 tablet 3     No current facility-administered medications for this visit. Facility-Administered Medications Ordered in Other Visits   Medication Dose Route Frequency Provider Last Rate Last Admin   • alteplase (CATHFLO) injection 2 mg  2 mg Intracatheter Q1MIN PRN Marivel Marinellisay, DO   2 mg at 08/11/23 1321     @[email or web address]@    SOCIAL HISTORY:   reports that she has never smoked. She has never used smokeless tobacco. She reports that she does not drink alcohol and does not use drugs. FAMILY HISTORY:  family history includes Colon cancer (age of onset: 62) in her mother; Hypertension in her daughter, mother, and son; Pancreatic cancer (age of onset: 61) in her father. ALLERGIES:  has No Known Allergies. Physical Exam:  Vital Signs:   Visit Vitals  /90 (BP Location: Left arm, Patient Position: Sitting, Cuff Size: Adult)   Pulse 67   Temp (!) 97.3 °F (36.3 °C)   Resp 17   Ht 5' 6" (1.676 m)   Wt 119 kg (262 lb)   SpO2 96%   BMI 42.29 kg/m²   OB Status Postmenopausal   Smoking Status Never   BSA 2.24 m²     Body mass index is 42.29 kg/m². Body surface area is 2.24 meters squared. GEN: Alert, awake oriented x3, in no acute distress  HEENT- Right neck mass measuring 4.5 x 5 cm ; No pallor, icterus, cyanosis, no oral mucosal lesions,   LAD - no palpable cervical, clavicle, axillary, inguinal LAD  Heart- normal S1 S2, regular rate and rhythm, No murmur, rubs.    Lungs- clear breathing sound bilateral.   Abdomen- soft, Non tender, bowel sounds present  Extremities- No cyanosis, clubbing, edema  Neuro- No focal neurological deficit    Labs:  Lab Results   Component Value Date    WBC 5.35 08/11/2023    HGB 8.7 (L) 08/11/2023    HCT 27.5 (L) 08/11/2023    MCV 82 08/11/2023     08/11/2023     Lab Results   Component Value Date    SODIUM 139 08/11/2023    K 3.0 (L) 08/11/2023     08/11/2023    CO2 25 08/11/2023    AGAP 8 08/11/2023    BUN 12 08/11/2023    CREATININE 0.58 (L) 08/11/2023    GLUC 99 08/11/2023    GLUF 113 (H) 07/24/2023    CALCIUM 8.5 08/11/2023    AST 39 08/11/2023    ALT 43 08/11/2023    ALKPHOS 94 08/11/2023    TP 6.5 08/11/2023    TBILI 0.35 08/11/2023    EGFR 95 08/11/2023     Jack Ugarte DO   Hematology and Medical Oncology - PGY Ej

## 2023-08-11 NOTE — PROGRESS NOTES
1310 Upon access of PAC no blood return noted. Nursing interventions performed with no success. [address] [facility] [address] with positive results in 30 minutes. Patient tolerated central labs without any complications.

## 2023-08-14 ENCOUNTER — HOSPITAL ENCOUNTER (OUTPATIENT)
Dept: INFUSION CENTER | Facility: CLINIC | Age: 68
Discharge: HOME | End: 2023-08-14
Payer: MEDICARE

## 2023-08-14 VITALS — WEIGHT: 262.35 LBS | HEIGHT: 66 IN | BODY MASS INDEX: 42.16 KG/M2

## 2023-08-14 DIAGNOSIS — C18.9 METASTATIC COLON CANCER TO LIVER (HCC): Primary | ICD-10-CM

## 2023-08-14 DIAGNOSIS — C78.7 METASTATIC COLON CANCER TO LIVER (HCC): Primary | ICD-10-CM

## 2023-08-14 PROCEDURE — 96368 THER/DIAG CONCURRENT INF: CPT

## 2023-08-14 PROCEDURE — 96367 TX/PROPH/DG ADDL SEQ IV INF: CPT

## 2023-08-14 PROCEDURE — 96411 CHEMO IV PUSH ADDL DRUG: CPT

## 2023-08-14 PROCEDURE — G0498 CHEMO EXTEND IV INFUS W/PUMP: HCPCS

## 2023-08-14 PROCEDURE — 96415 CHEMO IV INFUSION ADDL HR: CPT

## 2023-08-14 PROCEDURE — 96413 CHEMO IV INFUSION 1 HR: CPT

## 2023-08-14 RX ORDER — SODIUM CHLORIDE 9 MG/ML
20 INJECTION, SOLUTION INTRAVENOUS ONCE AS NEEDED
Status: DISCONTINUED | OUTPATIENT
Start: 2023-08-14 | End: 2023-08-17 | Stop reason: HOSPADM

## 2023-08-14 RX ORDER — FLUOROURACIL 50 MG/ML
900 INJECTION, SOLUTION INTRAVENOUS ONCE
Status: COMPLETED | OUTPATIENT
Start: 2023-08-14 | End: 2023-08-14

## 2023-08-14 RX ORDER — DEXTROSE MONOHYDRATE 50 MG/ML
20 INJECTION, SOLUTION INTRAVENOUS ONCE
Status: COMPLETED | OUTPATIENT
Start: 2023-08-14 | End: 2023-08-14

## 2023-08-14 RX ADMIN — DEXAMETHASONE SODIUM PHOSPHATE: 10 INJECTION, SOLUTION INTRAMUSCULAR; INTRAVENOUS at 08:49

## 2023-08-14 RX ADMIN — LEUCOVORIN CALCIUM 900 MG: 500 INJECTION, POWDER, LYOPHILIZED, FOR SOLUTION INTRAMUSCULAR; INTRAVENOUS at 09:40

## 2023-08-14 RX ADMIN — OXALIPLATIN 190.4 MG: 5 INJECTION, SOLUTION INTRAVENOUS at 09:44

## 2023-08-14 RX ADMIN — DEXTROSE 20 ML/HR: 5 SOLUTION INTRAVENOUS at 09:30

## 2023-08-14 RX ADMIN — FLUOROURACIL 900 MG: 50 INJECTION, SOLUTION INTRAVENOUS at 11:42

## 2023-08-14 RX ADMIN — SODIUM CHLORIDE 20 ML/HR: 0.9 INJECTION, SOLUTION INTRAVENOUS at 08:40

## 2023-08-14 NOTE — PROGRESS NOTES
Pt. Denied new symptoms or concerns today. Pt. Denies overall weakness. Labs reviewed. K+ 3.0  Hgb 7.8  Pt. Tolerated Oxaliplatin and Leucovorin without adverse event. 5FU will infusion over next 46 hours as ordered via BRANDIE. Pt. Will call with any concerns. RN notified office of K+ 3.0. Per Kiersten Pickett RN, will discuss with Dr. Canelo Nixon and follow up with patient per orders. AVS declined.

## 2023-08-15 ENCOUNTER — TELEPHONE (OUTPATIENT)
Dept: HEMATOLOGY ONCOLOGY | Facility: CLINIC | Age: 68
End: 2023-08-15

## 2023-08-15 ENCOUNTER — HOSPITAL ENCOUNTER (OUTPATIENT)
Dept: RADIOLOGY | Facility: HOSPITAL | Age: 68
Discharge: HOME | End: 2023-08-15
Attending: OTOLARYNGOLOGY
Payer: MEDICARE

## 2023-08-15 DIAGNOSIS — R13.10 DYSPHAGIA, UNSPECIFIED TYPE: ICD-10-CM

## 2023-08-15 DIAGNOSIS — K21.9 GASTROESOPHAGEAL REFLUX DISEASE, UNSPECIFIED WHETHER ESOPHAGITIS PRESENT: ICD-10-CM

## 2023-08-15 DIAGNOSIS — C09.9 RIGHT TONSILLAR SQUAMOUS CELL CARCINOMA (HCC): ICD-10-CM

## 2023-08-15 DIAGNOSIS — C78.7 METASTATIC COLON CANCER TO LIVER (HCC): Primary | ICD-10-CM

## 2023-08-15 DIAGNOSIS — E87.6 LOW SERUM POTASSIUM: ICD-10-CM

## 2023-08-15 DIAGNOSIS — C18.9 METASTATIC COLON CANCER TO LIVER (HCC): Primary | ICD-10-CM

## 2023-08-15 PROCEDURE — 92611 MOTION FLUOROSCOPY/SWALLOW: CPT

## 2023-08-15 PROCEDURE — 74230 X-RAY XM SWLNG FUNCJ C+: CPT

## 2023-08-15 RX ORDER — POTASSIUM CHLORIDE 20 MEQ/1
20 TABLET, EXTENDED RELEASE ORAL 2 TIMES DAILY
Qty: 14 TABLET | Refills: 0 | Status: SHIPPED | OUTPATIENT
Start: 2023-08-15 | End: 2023-08-22

## 2023-08-15 NOTE — PROCEDURES
Video Swallow Study      Patient Name: Bill STARR Date: 8/15/2023        Past Medical History  Past Medical History:   Diagnosis Date   • Anemia    • Arthritis    • Breast cancer ([address]) 12/17/2018   • Cancer ([address])     right breast, colon, liver, right tonsil   • GERD (gastroesophageal reflux disease)    • Hyperlipidemia    • Hypertension    • Obesity    • Stroke [facility])     TIA    • Transaminitis 09/22/2021        Past Surgical History  Past Surgical History:   Procedure Laterality Date   • BREAST BIOPSY Right 11/23/2018    us guided bx cancer   • BREAST SURGERY     • ESOPHAGOSCOPY N/A 7/20/2023    Procedure: ESOPHAGOSCOPY;  Surgeon: Jose Sheldon MD;  Location: [facility];  Service: ENT   • IR BIOPSY LIVER MASS  6/27/2023   • IR PORT PLACEMENT  7/28/2023   •  [address] INCL FLUOR GDNCE DX W/CELL WASHG [facility] N/A 7/20/2023    Procedure: BRONCHOSCOPY;  Surgeon: Jose Sheldon MD;  Location: [facility];  Service: ENT   • CO LARYNGOSCOPY W/BIOPSY MICROSCOPE/TELESCOPE N/A 7/20/2023    Procedure: MICRODIRECT LARYNGOSCOPY WITH BIOPSY;  Surgeon: Jose Sheldon MD;  Location: [facility];  Service: ENT   • CO MASTECTOMY PARTIAL W/AXILLARY LYMPHADENECTOMY Right 12/20/2018    Procedure: ULTRASOUND LOCALIZED PARTIAL MASTECTOMY W/SENTINEL NODE BIOPSY POSS AXILLARY DISSECTION;  Surgeon: Delia Marx MD;  Location: [address] [place] [address] MAIN OR;  Service: General   • TUBAL LIGATION     • US GUIDED BREAST BIOPSY RIGHT COMPLETE Right 11/23/2018   • [facility] STUDY  12/20/2018   • [facility] STUDY  12/7/2018   • [facility] STUDY  5/3/2019   • US GUIDED LYMPH NODE BIOPSY RIGHT  5/26/2023       Modified (Video) Barium Swallow Study    Summary:  Images are on PACS for review. Oral stage: WNL/WFL Mastication was slightly prolonged but functional.  Patient wears upper dentures and has minimal frontal lower natural dentition. Bolus control and formation were WNL. Transfer was intermittently piecemeal.  Mild lingual residue. Pharyngeal stage: WNL for prompt swallow, velar closure, hyoid excursion, laryngeal elevation, pharyngeal constriction, epiglottic inversion. No/trace pharyngeal retention. No penetration or aspiration. Per gross esophageal screen: Mild stasis following solids. Thin liquids cleared residual.  Pill cleared adequately. Recommendations:  Diet: Regular as tolerated  Liquids: Thin  Meds: As tolerated  Frequent oral care  Upright position  F/u ST tx: Not at this time. Please reconsult if issues arise during or following treatment  Reflux Precautions. History of GERD  Results reviewed with: pt  Repeat MBS as necessary  If a dedicated assessment of the esophagus is desired:  Consider esophagram/barium swallow w/ barium tablet administration   Consider EGD    Functional Oral Intake Scale (FOIS)  Reena Patricia PhD, F-GONZALO  (Does not include liquids)  7. Total oral diet with no restriction. Pt is a 72-year-old female referred by ENT for MBS. New Dx right tonsillar SCC. Noted swelling on right side of her neck. Patient stated it has improved. Currently denying any swallowing issues. Reported she has had 2 chemo treatments. Patient was advised to notify physician if she develops any swallowing difficulty. H&P/pertinent provider notes: (PMH noted above)  Per  Note from ENT, Dr Joe Lombardo 7/26/23:  ASSESSMENT/PLAN:  Teri Amos is a 80 y.o. female with acute and chronic problems as above who presents with:     1. 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HCC)    2. Cervical lymphadenopathy    3. Metastatic malignant neoplasm, unspecified site ([address])    4. Metastatic colon cancer to liver [facility])      80 y.o. here today for further evaluation of likely metastatic adenocarcinoma of colorectal origin w/ mets in chest, abdomen, pelvis, lever, ascending colon.  Recent DL in OR w/ R tonsillar biopsy showed p16+ SCC. Pt will need appt w/ SLP, med/rad onc, and palliative for coordinative care moving forward. Will continue discussion w/ cancer care team to decide on optimal tx course, including CRT. RTC 3m for re-eval    Special Studies:  Multiple studies noted. Previous MBS:  none      Food allergies:  None   Current diet:  Regular   Premorbid diet:  Regular   Dentition:  Upper plate, minimal frontal dentition   O2 requirement:  None   Oral mech: WNL   Vocal quality/speech:  WNL    Cognitive status: alert       Consistencies administered: Puree, nutrigrain bar, hard cookie, turkey sandwich bite, barium pill w/ thin, thin.     Pt was viewed standing laterally and AP

## 2023-08-15 NOTE — TELEPHONE ENCOUNTER
Spoke with patient to let her know that her potassium is 3.0 and we would like her to take 20mEq BID for 7 days and we will recheck at her next lab work visit. Patient verbalized understanding.

## 2023-08-16 ENCOUNTER — PATIENT OUTREACH (OUTPATIENT)
Dept: HEMATOLOGY ONCOLOGY | Facility: CLINIC | Age: 68
End: 2023-08-16

## 2023-08-16 ENCOUNTER — HOSPITAL ENCOUNTER (OUTPATIENT)
Dept: INFUSION CENTER | Facility: CLINIC | Age: 68
Discharge: HOME | End: 2023-08-16

## 2023-08-16 VITALS
HEART RATE: 87 BPM | RESPIRATION RATE: 17 BRPM | TEMPERATURE: 96.8 F | DIASTOLIC BLOOD PRESSURE: 73 MMHG | SYSTOLIC BLOOD PRESSURE: 132 MMHG

## 2023-08-16 DIAGNOSIS — C78.7 METASTATIC COLON CANCER TO LIVER (HCC): Primary | ICD-10-CM

## 2023-08-16 DIAGNOSIS — C18.9 METASTATIC COLON CANCER TO LIVER (HCC): Primary | ICD-10-CM

## 2023-08-16 NOTE — PROGRESS NOTES
Patient arrived to appointment without concerns. BRANDIE pump completed at time of DC. AVS printed and given to patient.

## 2023-08-16 NOTE — PROGRESS NOTES
Called pt to check in and see how shes doing.  No answer, left my contact information for her to call me back

## 2023-08-17 ENCOUNTER — NUTRITION (OUTPATIENT)
Dept: NUTRITION | Facility: CLINIC | Age: 68
End: 2023-08-17

## 2023-08-17 NOTE — PROGRESS NOTES
Outpatient Oncology Nutrition Consultation   Type of Consult: Initial Consult  Care Location: Telephone Call    Reason for referral: Received notification by [facility] RN, Carol Gillette., on 8/1/23 that pt has triggered for oncology nutrition care (reason for referral: HNC dx and Malnutrition Screening Tool (MST) Triggers: scored a 0 indicating No recent wt loss and is not eating poorly due to a decreased appetite. (Date of MST: 8/1/23)). 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   - grade 2  - %  - WV 95%  - HER2 negative     12/20/2018 Surgery    Right partial mastectomy and SLN biopsy:  Infiltrating ductal carcinoma, Grade 2/3, felt to be between 2.0 cm and 2.5 cm in greatest dimension  - No invasive tumor is seen at inked margins, but there is a focus of DCIS seen within approximately 1mm of the inked medial margin  - no LVI  - no LCIS  - 0/2 LN's  at least Stage IIA - pT2, pN0, cM0, G2.    - Dr Farida Schmidt     12/20/2018 -  Cancer Staged    Stage IIA - pT2, pN0, cM0, G2.       1/4/2019 Genomic Testing    Oncotype DX score: 13 2/1/2019 -  Hormone Therapy    anastrozole 1 mg daily as adjuvant endocrine therapy    - Dr Curt Braswell       2/14/2019 - 4/3/2019 Radiation    Course: C1    Plan ID Energy Fractions Dose per Fraction (cGy) Dose Correction (cGy) Total Dose Delivered (cGy) Elapsed Days   R Breast 10X/6X 25 / 25 200 0 5,000 40   R BRST BOOST 16E 5 / 5 200 0 1,000 7      Treatment dates:  C1: 2/14/2019 - 4/3/2019       Metastatic colon cancer to liver (HCC)   7/6/2023 Genetic Testing    CARIS Results:   • KRAS Pathogenic Variant Exon 2  p.G12D : lack of benefit of cetuximab, panitumumab Level 2   • No other actionable mutation; MSI stable; TMB low   • MiFOLOXAi Results: "Decreased Benefit of FOLFOX + Bevacizumab in first line metastatic CRC. This patient may achieve improved results by receiving an alternative to FOLFOX, such as FOLFIRI, as their initial regimen. As an adjustment to frontline FOLFOXIRI following toxicity: This patient may achieve improved results by removing the oxaliplatin portion of their regimen".          7/11/2023 Initial Diagnosis    Metastatic colon cancer to liver ([address])     7/13/2023 -  Cancer Staged    Staging form: Colon and Rectum, AJCC 8th Edition  - Clinical stage from 7/13/2023: Stage Unknown (cTX, cNX, pM1) - Signed by Marivel Chung DO on 7/13/2023 7/31/2023 -  Chemotherapy    alteplase (CATHFLO), 2 mg, Intracatheter, Every 1 Minute as needed, 2 of 12 cycles  fluorouracil (ADRUCIL), 895 mg, Intravenous, Once, 2 of 12 cycles  Administration: 900 mg (7/31/2023), 900 mg (8/14/2023)  leucovorin calcium IVPB, 896 mg, Intravenous, Once, 2 of 12 cycles  Administration: 900 mg (7/31/2023), 900 mg (8/14/2023)  oxaliplatin (ELOXATIN) chemo infusion, 85 mg/m2 = 190.4 mg, Intravenous, Once, 2 of 12 cycles  Administration: 190.4 mg (7/31/2023), 190.4 mg (8/14/2023)  fluorouracil (ADRUCIL) ambulatory infusion Soln, 1,200 mg/m2/day = 5,375 mg, Intravenous, Over 46 hours, 2 of 12 cycles     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[address])   7/27/2023 Initial Diagnosis    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[address])     7/27/2023 -  Cancer Staged    Staging form: Pharynx - Oropharynx, AJCC 8th Edition  - Clinical stage from 7/27/2023: Stage III (cT1, cN3, cM0, p16+) - Signed by Ioana Chavez MD on 7/27/2023  Stage prefix: Initial diagnosis         Past Medical & Surgical Hx:   Patient Active Problem List   Diagnosis   • Primary hypertension   • Vasomotor rhinitis   • Mixed hyperlipidemia   • Screen for colon cancer   • Malignant neoplasm of right female breast ([address])   • Vitamin D deficiency   • Encounter for screening for HIV   • Prediabetes   • Cervical lymphadenopathy   • Follow-up examination   • Right thyroid nodule   • GERD (gastroesophageal reflux disease)   • Stroke [facility])   • Obesity   • Metastatic colon cancer to liver [facility])   • 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[address])   • Poor appetite     Past Medical History:   Diagnosis Date   • Anemia    • Arthritis    • Breast cancer ([address]) 12/17/2018   • Cancer ([address])     right breast, colon, liver, right tonsil   • GERD (gastroesophageal reflux disease)    • Hyperlipidemia    • Hypertension    • Obesity    • Stroke [facility])     TIA    • Transaminitis 09/22/2021     Past Surgical History:   Procedure Laterality Date   • BREAST BIOPSY Right 11/23/2018    us guided bx cancer   • BREAST SURGERY     • ESOPHAGOSCOPY N/A 7/20/2023    Procedure: ESOPHAGOSCOPY;  Surgeon: Lety Poole MD;  [facility];  Service: ENT   • IR BIOPSY LIVER MASS  6/27/2023   • IR PORT PLACEMENT  7/28/2023   •  [address] INCL FLUOR GDNCE DX W/CELL WASHG [facility] N/A 7/20/2023    Procedure: BRONCHOSCOPY;  Surgeon: Lety Poole MD;  [facility];  Service: ENT   • MI LARYNGOSCOPY W/BIOPSY MICROSCOPE/TELESCOPE N/A 7/20/2023    Procedure: Carlos Dodd LARYNGOSCOPY WITH BIOPSY;  Surgeon: Lety Poole MD;  [facility];  Service: ENT   • MI MASTECTOMY PARTIAL W/AXILLARY LYMPHADENECTOMY Right 12/20/2018    Procedure: ULTRASOUND LOCALIZED PARTIAL MASTECTOMY W/SENTINEL NODE BIOPSY POSS AXILLARY DISSECTION;  Surgeon: Huyen Reilly MD;  Location: [address] [place] [address] OR;  Service: General   • TUBAL LIGATION     • US GUIDED BREAST BIOPSY RIGHT COMPLETE Right 11/23/2018   • US GUIDED INJECTION FOR RESEARCH STUDY  12/20/2018   • US GUIDED INJECTION FOR RESEARCH STUDY  12/7/2018   • US GUIDED INJECTION FOR RESEARCH STUDY  5/3/2019   • US GUIDED LYMPH NODE BIOPSY RIGHT  5/26/2023       Review of Medications:   Vitamins, Supplements and Herbals: No, pt denies taking supplements    Current Outpatient Medications:   •  anastrozole (ARIMIDEX) 1 mg tablet, Take 1 tablet (1 mg total) by mouth daily, Disp: 90 tablet, Rfl: 3  •  atorvastatin (LIPITOR) 40 mg tablet, Take 1 tablet (40 mg total) by mouth daily at bedtime, Disp: 30 tablet, Rfl: 2  •  lidocaine-prilocaine (EMLA) cream, Apply topically as needed for mild pain, Disp: 30 g, Rfl: 3  •  losartan (COZAAR) 50 mg tablet, Take 1 tablet (50 mg total) by mouth daily, Disp: 90 tablet, Rfl: 1  •  omeprazole (PriLOSEC) 20 mg delayed release capsule, Take 1 capsule (20 mg total) by mouth daily, Disp: 30 capsule, Rfl: 5  •  ondansetron (ZOFRAN) 8 mg tablet, Take 1 tablet (8 mg total) by mouth every 8 (eight) hours as needed for nausea or vomiting, Disp: 30 tablet, Rfl: 3  •  polyethylene glycol (GOLYTELY) 4000 mL solution, Take 4,000 mL by mouth once for 1 dose, Disp: 4000 mL, Rfl: 0  •  potassium chloride (K-DUR,KLOR-CON) 20 mEq tablet, Take 1 tablet (20 mEq total) by mouth 2 (two) times a day for 7 days, Disp: 14 tablet, Rfl: 0  •  prochlorperazine (COMPAZINE) 10 mg tablet, Take 1 tablet (10 mg total) by mouth every 6 (six) hours as needed for nausea or vomiting (Patient not taking: Reported on 8/3/2023), Disp: 30 tablet, Rfl: 3  No current facility-administered medications for this visit.     Facility-Administered Medications Ordered in Other Visits:   • alteplase (CATHFLO) injection 2 mg, 2 mg, Intracatheter, Q1MIN PRN, Jie Greer DO    Most Recent Lab Results:   Lab Results   Component Value Date    WBC 5.35 08/11/2023    NEUTROABS 2.67 08/11/2023    CHOLESTEROL 222 (H) 06/13/2023    TRIG 140 06/13/2023    HDL 44 (L) 06/13/2023    LDLCALC 150 (H) 06/13/2023    ALT 43 08/11/2023    AST 39 08/11/2023    ALB 3.3 (L) 08/11/2023    SODIUM 139 08/11/2023    SODIUM 140 07/24/2023    K 3.0 (L) 08/11/2023    K 4.0 07/24/2023     08/11/2023    BUN 12 08/11/2023    BUN 13 07/24/2023    CREATININE 0.58 (L) 08/11/2023    CREATININE 0.75 07/24/2023    EGFR 95 08/11/2023    POCGLU 106 06/19/2023    GLUF 113 (H) 07/24/2023    GLUF 116 (H) 06/13/2023    GLUC 99 08/11/2023    HGBA1C 5.9 (H) 06/13/2023    HGBA1C 6.1 (H) 07/09/2022    HGBA1C 6.1 (H) 09/16/2021    CALCIUM 8.5 08/11/2023    FOLATE >20.0 (H) 05/23/2019       Anthropometric Measurements:   Height: 66"  Ht Readings from Last 1 Encounters:   08/14/23 5' 5.98" (1.676 m)     Wt Readings from Last 20 Encounters:   08/14/23 119 kg (262 lb 5.6 oz)   08/11/23 119 kg (262 lb)   08/03/23 118 kg (260 lb 12.8 oz)   08/01/23 121 kg (267 lb)   07/31/23 120 kg (264 lb 8.8 oz)   07/28/23 119 kg (263 lb)   07/11/23 119 kg (263 lb)   07/06/23 120 kg (264 lb)   07/05/23 120 kg (264 lb)   06/30/23 120 kg (264 lb 3.2 oz)   06/27/23 122 kg (268 lb)   06/07/23 122 kg (268 lb 9.6 oz)   04/26/23 125 kg (275 lb)   04/04/23 126 kg (278 lb 3.2 oz)   03/21/23 125 kg (275 lb)   07/07/22 124 kg (273 lb)   09/22/21 127 kg (281 lb)   08/16/21 126 kg (277 lb)   06/10/21 126 kg (278 lb)   05/19/21 126 kg (278 lb)       Weight History:   • Usual Weight: 275#  • Varian: (2/22/19) 264.6#, (4/2/19) 263.4  • Home Scale: (somtime in july) 276#    Oncology Nutrition-Anthropometrics    Flowsheet Row Nutrition from 8/17/2023 in [address]  Oncology Dietitian Services   Patient age (years): 80 years   Patient (female) height (in): 77 in Current Weight to be used for anthropometric calculations (lbs) 262.4 lbs   Current Weight to be used for anthropometric calculations (kg) 119.3 kg   BMI: 42.3   IBW female: 130 lbs   IBW (kg) female: 59.1 kg   IBW % (female) 201.8 %   Adjusted BW (female): 163.1 lbs   Adjusted BW kg (female): 74.1 kg   % weight change after 1 month: -0.2 %   Weight change after 1 month (lbs) -0.6 lbs   % weight change after 1 year: -3.9 %          Nutrition-Focused Physical Findings: n/a due to telephone call    Food/Nutrition-Related History & Client/Social History:    Current Nutrition Impact Symptoms:  [] Nausea  [x] Reduced Appetite -fluctuates [] Acid Reflux    [] Vomiting  [x] Unintended Wt Loss  [] Malabsorption    [] Diarrhea  [] Unintended Wt Gain  [] Dumping Syndrome    [] Constipation  [] Thick Mucous/Secretions  [] Abdominal Pain    [x] Dysgeusia (Altered Taste)  [x] Xerostomia (Dry Mouth)  [] Gas    [] Dysosmia (Altered Smell)  [] Thrush  [] Difficulty Chewing    [] Oral Mucositis (Sore Mouth)  [x] Fatigue  [] Hyperglycemia   Lab Results   Component Value Date    HGBA1C 5.9 (H) 06/13/2023      [] Odynophagia  [] Esophagitis  [] Other:    [] Dysphagia -swallow study 8/15/23- regular diet/thin liquids [] Early Satiety  [] No Problems Eating      Food Allergies & Intolerances: no    Current Diet: Regular Diet, No Restrictions  Current Nutrition Intake: Less than usual   Appetite: Good, Fair , Fluctuating  Nutrition Route: PO  Oral Care: brushes daily  Activity level: ADLs, very fatigued so activity is limited     24 Hr Diet Recall:   Breakfast: regular yogurt w/ fruit  Snack: nothing  Lunch: egg, sausage and cheese burrito  Snack: nothing  Dinner: mashed potatoes, broccoli, and small amount of enrique steak  Snack: nothing    Beverages: water (16.9oz x1), iced tea (8oz x3-4), soda (16oz x2)  Supplements:   • None-has some at home but hasn't tried yet      Oncology Nutrition-Estimated Needs    Flowsheet Row Nutrition from 2023 in [facility] Oncology Dietitian Services   Weight type used Adjusted weight   Weight in kilograms (kg) used for estimated needs 74.1 kg   Energy needs formula:  30-35 kcal/kg   Energy needs based on 30 kcal/k kcal   Energy needs based on 35 kcal/k kcal   Protein needs formula: 1.2-1.5 g/kg   Protein needs based on 1.2 g/k g   Protein needs based on 1.5 g/kg 111 g   Fluid needs formula: 30-35 mL/kg   Fluid needs based on 30 mL/kg 2223 mL   Fluid needs in ounces 75 oz   Fluid needs based on 35 mL/kg 2594 mL   Fluid needs in ounces 88 oz           Discussion & Intervention:   Anusha Crandall was evaluated today for an initial RD consultation regarding HNC. Anusha Crandall is currently undergoing tx for HNC. Spoke with Anusha Crandall today via phone. She reports fluctuating appetite. She states she is having some dysgeusia and xerostomia. She reports fatigue since starting treatment in July. She is eating about 3 meals/day. We discussed adequate calorie and protein intake, and encouraged her to increase her hydration. We also discussed adding snacks between meals. She has some Ensure products at home but hasn't tried any yet. She denies any trouble with swallowing at this time- she had a swallow study this week. Reviewed 24 hour recall, which revealed an suboptimal po intake, and discussed ways to increase kcal, protein, and fluid intakes and optimize nutrient intake. Also reviewed the importance of wt management throughout the tx process and the role of a high kcal/ high protein diet in managing wt and overall health.   Based on today's assessment, discussion included: MNT for: dysgeusia, xerostomia, decreased appetite, hydration, how to modify foods for anticipated nutrition impact symptoms pt may experience during CA tx, practicing proper oral care, a high kcal/protein diet & food choices to include at all meals & snacks (Examples of high kcal foods: cheese, full-fat dairy products, nut butter, plant-based fats, coconut oil/milk, avocado, butter, cream soup, etc. Examples of high protein foods: eggs, chicken, fish, beans/legumes, nuts/nut butters, bone broth, etc.) , fortifying foods for added kcal and protein (examples include: adding cheese to foods such as eggs, mashed potatoes, casseroles, etc.; Making oatmeal with whole milk rather than water; Making fortified mashed potatoes with cream, butter, dry milk powder, plain [place] yogurt, and cheese.), adequate hydration & fluid choices, sipping on calorie containing beverages (examples include: adding whole milk or cream to coffee, oral nutrition supplements, juice, electrolyte replacement beverages, milk, etc.), eating smaller more frequent meals every 2-3 hours (5-6 small meals/day) and utilizing oral nutrition supplements. Moving forward, Noelle Gurrola was encouraged to increase kcal, protein, and fluid intakes. Materials Provided: all e-mailed to pt (@ '[email or web address]'): , all mailed to pt: , [facility] Eating Hints book, Ensure Coupons, Oncology Milkshake &  Smoothie Recipe Packet, Commercial Nutrition Supplements List, high calorie/high protein snack ideas, Increasing Calories and Protein handout, Oncology Nutrition Services Brochure and Oncology Nutrition Class Flyer  All questions and concerns addressed during today’s visit. Noelle Gurrola has RD contact information. Nutrition Diagnosis:   • Inadequate Energy Intake related to physiological causes, disease state and treatment related issues as evidenced by food recall, wt loss and discussion with pt and/or family. • Increased Nutrient Needs (kcal & pro) related to increased demand for nutrients and disease state as evidenced by cancer dx and pt undergoing tx for cancer.   Monitoring & Evaluation:   Goals:  · weight maintenance/stabilization  · adequate nutrition impact symptom management  · pt to meet >/=75% estimated nutrition needs daily    · Progress Towards Goals: Initiated    Nutrition Rx & Recommendations:  · Diet: High Calorie, High Protein (for high calorie foods see pages 52-53, and for high protein foods see pages 49-51 in your Eating Hints book)  · Nutrition Supplements: consider trying once/day. Encouraged Ensure Plus or Complete. May use homemade shakes/smoothies as desired. If using a pre-made shake/bar, choose ones with >300 calories and >10 grams protein (ex. Ensure Complete, Ensure Enlive, Ensure Plus, Boost Plus, Boost Very High Calorie, etc.). · Small, frequent meals/snacks may be easier to tolerate than 3 large daily meals. Aim for 5-6 small meals per day. · Include protein at all meals/snacks. · Include a variety of foods (as tolerated/allowed by diet). · Stay hydrated by sipping fluids of choice/tolerance throughout the day. · Liquid nutrition may be better tolerated than solids at times. · Alter food choices and eating patterns to accommodate changing needs. · Refer to Eating Hints book for other meal/snack ideas and symptom management. · Follow proper oral care; Try baking soda/salt water rinse recipe (mix 3/4 tsp salt + 1 tsp baking soda + 1 qt water; rinse with plain water after using) in Eating Hints book (pg 18). Brush your teeth before/after meals & before bed. · For lack of taste: Practice good oral care. Blend fresh fruits into shakes, ice cream or yogurt. Eat frozen fruits: grapes, chopped cantaloupe, watermelon. Select fresh vegetables (may be more appealing than canned/frozen). Add extra flavor to foods: experiment with spices, salt & sweeteners, extra oil/butter; marinate meats (see pages 29-30 in your Eating Hints book).   · For appetite loss: try powdered or liquid nutrition supplements; eating by the clock every 2-3 hours; set a timer to remind yourself to eat, keep snacks nearby; add extra protein & calories to your diet; drink liquids in between meals, choose liquids that add calories; eat a bedtime snack; eat soft, cool or frozen foods; eat a larger meal when you feel well & rested; only sip small amounts of liquids during meals (see pages 10-12 in your Eating Hints book). · For weight loss: monitor your weight at home at least 2x/week, record your weight, start by adding 250-500 extra calories per day, eat 5-6 small scheduled meals every 2-3 hrs, choose foods that are high in protein and calories (see pages 49-53 in your Eating Hints book), drink liquids with calories for example: milkshakes/smoothies/juice/soup/whole milk/chocolate milk, cook with protein fortified milk (see recipe on page 36 in your Eating Hints book), consider ready-to-drink oral nutrition supplements such as Ensure Plus, Ensure Enlive, Boost Plus, or Boost Very High Calorie, avoid "diet" and "light" foods when possible, avoid drinking too much with meals, contact your dietitian with any continued weight loss over the course of 1 week. For more info see pages 35-37 in your Eating Hints book. · For dry mouth: sip water throughout the day; try very sweet drinks; chew gum or suck on hard candy, popsicles, & ice chips; eat easy to swallow foods (such as pureed cooked foods or soups); moisten food with sauce/gravy/salad dressing; do not drink beer, wine, or any type of alcohol; keep lips moist with lip balm; avoid tobacco products & second-hand smoke; try Biotene as needed (see pages 17-18 in your Eating Hints book). Follow proper oral care; Try baking soda/salt water rinse recipe (mix 3/4 tsp salt + 1 tsp baking soda + 1 qt water; rinse with pain water after using) in Eating Hints book (pg 18). Brush your teeth before/after meals & before bed. · Weigh yourself regularly. If you notice weight loss, make an effort to increase your daily food/calorie intake. If you continue to notice loss after these efforts, reach out to your dietitian to establish a plan to stabilize weight. [place] with the [employer] Concept - add extra Fat, Acidity (as long as you do not have mouth or throat pain), Salt, and Sweetness to foods to help improve flavor.   · Try using greek yogurt instead of regular  · Snack ideas:  · [place] yogurt  · [address] and fruit  · Nuts or nut butter  · Soup or bone broth  · Egg chicken or tuna salad   · Oral nutrition supplement    Follow Up Plan: 9/11/23 during infusion  Recommend Referral to Other Providers: none at this time

## 2023-08-17 NOTE — PATIENT INSTRUCTIONS
Nutrition Rx & Recommendations:  Diet: High Calorie, High Protein (for high calorie foods see pages 52-53, and for high protein foods see pages 49-51 in your Eating Hints book)  Nutrition Supplements: consider trying once/day. Encouraged Ensure Plus or Complete. May use homemade shakes/smoothies as desired. If using a pre-made shake/bar, choose ones with >300 calories and >10 grams protein (ex. Ensure Complete, Ensure Enlive, Ensure Plus, Boost Plus, Boost Very High Calorie, etc.). Small, frequent meals/snacks may be easier to tolerate than 3 large daily meals. Aim for 5-6 small meals per day. Include protein at all meals/snacks. Include a variety of foods (as tolerated/allowed by diet). Stay hydrated by sipping fluids of choice/tolerance throughout the day. Liquid nutrition may be better tolerated than solids at times. Alter food choices and eating patterns to accommodate changing needs. Refer to Eating Hints book for other meal/snack ideas and symptom management. Follow proper oral care; Try baking soda/salt water rinse recipe (mix 3/4 tsp salt + 1 tsp baking soda + 1 qt water; rinse with plain water after using) in Eating Hints book (pg 18). Brush your teeth before/after meals & before bed. For lack of taste: Practice good oral care. Blend fresh fruits into shakes, ice cream or yogurt. Eat frozen fruits: grapes, chopped cantaloupe, watermelon. Select fresh vegetables (may be more appealing than canned/frozen). Add extra flavor to foods: experiment with spices, salt & sweeteners, extra oil/butter; marinate meats (see pages 29-30 in your Eating Hints book).   For appetite loss: try powdered or liquid nutrition supplements; eating by the clock every 2-3 hours; set a timer to remind yourself to eat, keep snacks nearby; add extra protein & calories to your diet; drink liquids in between meals, choose liquids that add calories; eat a bedtime snack; eat soft, cool or frozen foods; eat a larger meal when you feel well & rested; only sip small amounts of liquids during meals (see pages 10-12 in your Eating Hints book). For weight loss: monitor your weight at home at least 2x/week, record your weight, start by adding 250-500 extra calories per day, eat 5-6 small scheduled meals every 2-3 hrs, choose foods that are high in protein and calories (see pages 49-53 in your Eating Hints book), drink liquids with calories for example: milkshakes/smoothies/juice/soup/whole milk/chocolate milk, cook with protein fortified milk (see recipe on page 36 in your Eating Hints book), consider ready-to-drink oral nutrition supplements such as Ensure Plus, Ensure Enlive, Boost Plus, or Boost Very High Calorie, avoid "diet" and "light" foods when possible, avoid drinking too much with meals, contact your dietitian with any continued weight loss over the course of 1 week. For more info see pages 35-37 in your Eating Hints book. For dry mouth: sip water throughout the day; try very sweet drinks; chew gum or suck on hard candy, popsicles, & ice chips; eat easy to swallow foods (such as pureed cooked foods or soups); moisten food with sauce/gravy/salad dressing; do not drink beer, wine, or any type of alcohol; keep lips moist with lip balm; avoid tobacco products & second-hand smoke; try Biotene as needed (see pages 17-18 in your Eating Hints book). Follow proper oral care; Try baking soda/salt water rinse recipe (mix 3/4 tsp salt + 1 tsp baking soda + 1 qt water; rinse with pain water after using) in Eating Hints book (pg 18). Brush your teeth before/after meals & before bed. Weigh yourself regularly. If you notice weight loss, make an effort to increase your daily food/calorie intake. If you continue to notice loss after these efforts, reach out to your dietitian to establish a plan to stabilize weight. [place] with the F.A.S.S. Concept - add extra Fat, Acidity (as long as you do not have mouth or throat pain), Salt, and Sweetness to foods to help improve flavor.   Try using greek yogurt instead of regular  Snack ideas:  [place] yogurt  Cottage cheese and fruit  Nuts or nut butter  Soup or bone broth  Egg chicken or tuna salad   Oral nutrition supplement    Follow Up Plan: 9/11/23 during infusion  Recommend Referral to Other Providers: none at this time

## 2023-08-18 ENCOUNTER — PATIENT OUTREACH (OUTPATIENT)
Dept: HEMATOLOGY ONCOLOGY | Facility: CLINIC | Age: 68
End: 2023-08-18

## 2023-08-18 ENCOUNTER — TELEPHONE (OUTPATIENT)
Dept: HEMATOLOGY ONCOLOGY | Facility: CLINIC | Age: 68
End: 2023-08-18

## 2023-08-18 NOTE — PROGRESS NOTES
Called pt to check in prior to staring treatment, she is aware of when to go for he labs, and all upcoming appointments. She already has met with Genetics. She is feeling good so far with no pain. I sent an e mail to  [facility]  Confirming her treatment schedule.   I explained that myself and ROLAND Khadar Navarro will be in contact during her treatment, she has our contact information if she needs anything     Confirmed with [facility] that pts treatment schedule was sent to  [facility] for transportation

## 2023-08-19 ENCOUNTER — APPOINTMENT (OUTPATIENT)
Dept: LAB | Facility: HOSPITAL | Age: 68
End: 2023-08-19
Payer: MEDICARE

## 2023-08-19 DIAGNOSIS — C78.7 METASTATIC COLON CANCER TO LIVER (HCC): ICD-10-CM

## 2023-08-19 DIAGNOSIS — C18.9 METASTATIC COLON CANCER TO LIVER (HCC): ICD-10-CM

## 2023-08-19 LAB
ALBUMIN SERPL BCP-MCNC: 3.7 G/DL (ref 3.5–5)
ALP SERPL-CCNC: 111 U/L (ref 34–104)
ALT SERPL W P-5'-P-CCNC: 50 U/L (ref 7–52)
ANION GAP SERPL CALCULATED.3IONS-SCNC: 6 MMOL/L
AST SERPL W P-5'-P-CCNC: 44 U/L (ref 13–39)
BASOPHILS # BLD AUTO: 0.02 THOUSANDS/ÂΜL (ref 0–0.1)
BASOPHILS NFR BLD AUTO: 1 % (ref 0–1)
BILIRUB SERPL-MCNC: 0.5 MG/DL (ref 0.2–1)
BUN SERPL-MCNC: 13 MG/DL (ref 5–25)
CALCIUM SERPL-MCNC: 9 MG/DL (ref 8.4–10.2)
CEA SERPL-MCNC: 33.9 NG/ML (ref 0–3)
CHLORIDE SERPL-SCNC: 103 MMOL/L (ref 96–108)
CO2 SERPL-SCNC: 28 MMOL/L (ref 21–32)
CREAT SERPL-MCNC: 0.75 MG/DL (ref 0.6–1.3)
EOSINOPHIL # BLD AUTO: 0.13 THOUSAND/ÂΜL (ref 0–0.61)
EOSINOPHIL NFR BLD AUTO: 4 % (ref 0–6)
ERYTHROCYTE [DISTWIDTH] IN BLOOD BY AUTOMATED COUNT: 14.8 % (ref 11.6–15.1)
GFR SERPL CREATININE-BSD FRML MDRD: 82 ML/MIN/1.73SQ M
GLUCOSE P FAST SERPL-MCNC: 110 MG/DL (ref 65–99)
HCT VFR BLD AUTO: 31.1 % (ref 34.8–46.1)
HGB BLD-MCNC: 9.7 G/DL (ref 11.5–15.4)
IMM GRANULOCYTES # BLD AUTO: 0.01 THOUSAND/UL (ref 0–0.2)
IMM GRANULOCYTES NFR BLD AUTO: 0 % (ref 0–2)
LYMPHOCYTES # BLD AUTO: 1.35 THOUSANDS/ÂΜL (ref 0.6–4.47)
LYMPHOCYTES NFR BLD AUTO: 40 % (ref 14–44)
MCH RBC QN AUTO: 25 PG (ref 26.8–34.3)
MCHC RBC AUTO-ENTMCNC: 31.2 G/DL (ref 31.4–37.4)
MCV RBC AUTO: 80 FL (ref 82–98)
MONOCYTES # BLD AUTO: 0.12 THOUSAND/ÂΜL (ref 0.17–1.22)
MONOCYTES NFR BLD AUTO: 4 % (ref 4–12)
NEUTROPHILS # BLD AUTO: 1.75 THOUSANDS/ÂΜL (ref 1.85–7.62)
NEUTS SEG NFR BLD AUTO: 51 % (ref 43–75)
NRBC BLD AUTO-RTO: 0 /100 WBCS
PLATELET # BLD AUTO: 345 THOUSANDS/UL (ref 149–390)
PMV BLD AUTO: 9.5 FL (ref 8.9–12.7)
POTASSIUM SERPL-SCNC: 4.6 MMOL/L (ref 3.5–5.3)
PROT SERPL-MCNC: 7.2 G/DL (ref 6.4–8.4)
RBC # BLD AUTO: 3.88 MILLION/UL (ref 3.81–5.12)
SODIUM SERPL-SCNC: 137 MMOL/L (ref 135–147)
WBC # BLD AUTO: 3.38 THOUSAND/UL (ref 4.31–10.16)

## 2023-08-19 PROCEDURE — 80053 COMPREHEN METABOLIC PANEL: CPT

## 2023-08-19 PROCEDURE — 82378 CARCINOEMBRYONIC ANTIGEN: CPT

## 2023-08-19 PROCEDURE — 85025 COMPLETE CBC W/AUTO DIFF WBC: CPT

## 2023-08-19 PROCEDURE — 36415 COLL VENOUS BLD VENIPUNCTURE: CPT

## 2023-08-21 DIAGNOSIS — C78.7 METASTATIC COLON CANCER TO LIVER (HCC): Primary | ICD-10-CM

## 2023-08-21 DIAGNOSIS — C18.9 METASTATIC COLON CANCER TO LIVER (HCC): Primary | ICD-10-CM

## 2023-08-22 ENCOUNTER — TELEPHONE (OUTPATIENT)
Dept: HEMATOLOGY ONCOLOGY | Facility: CLINIC | Age: 68
End: 2023-08-22

## 2023-08-22 ENCOUNTER — OFFICE VISIT (OUTPATIENT)
Dept: HEMATOLOGY ONCOLOGY | Facility: CLINIC | Age: 68
End: 2023-08-22
Payer: MEDICARE

## 2023-08-22 ENCOUNTER — DOCUMENTATION (OUTPATIENT)
Dept: HEMATOLOGY ONCOLOGY | Facility: CLINIC | Age: 68
End: 2023-08-22

## 2023-08-22 ENCOUNTER — PATIENT OUTREACH (OUTPATIENT)
Dept: HEMATOLOGY ONCOLOGY | Facility: CLINIC | Age: 68
End: 2023-08-22

## 2023-08-22 ENCOUNTER — OFFICE VISIT (OUTPATIENT)
Dept: DENTISTRY | Facility: CLINIC | Age: 68
End: 2023-08-22

## 2023-08-22 VITALS
HEART RATE: 97 BPM | WEIGHT: 255 LBS | SYSTOLIC BLOOD PRESSURE: 142 MMHG | TEMPERATURE: 97.3 F | OXYGEN SATURATION: 98 % | BODY MASS INDEX: 41.18 KG/M2 | DIASTOLIC BLOOD PRESSURE: 86 MMHG

## 2023-08-22 VITALS — HEART RATE: 98 BPM | SYSTOLIC BLOOD PRESSURE: 115 MMHG | DIASTOLIC BLOOD PRESSURE: 71 MMHG

## 2023-08-22 DIAGNOSIS — K03.6 DENTAL PLAQUE: Primary | ICD-10-CM

## 2023-08-22 DIAGNOSIS — C78.7 METASTATIC COLON CANCER TO LIVER (HCC): ICD-10-CM

## 2023-08-22 DIAGNOSIS — C18.9 METASTATIC COLON CANCER TO LIVER (HCC): ICD-10-CM

## 2023-08-22 DIAGNOSIS — C09.9 RIGHT TONSILLAR SQUAMOUS CELL CARCINOMA (HCC): ICD-10-CM

## 2023-08-22 DIAGNOSIS — D64.9 ANEMIA, UNSPECIFIED TYPE: Primary | ICD-10-CM

## 2023-08-22 PROCEDURE — 99214 OFFICE O/P EST MOD 30 MIN: CPT

## 2023-08-22 PROCEDURE — D1110 PROPHYLAXIS - ADULT: HCPCS

## 2023-08-22 NOTE — PROGRESS NOTES
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   - grade 2  - %  - CT 95%  - HER2 negative     2018 Surgery    Right partial mastectomy and SLN biopsy:  Infiltrating ductal carcinoma, Grade 2/3, felt to be between 2.0 cm and 2.5 cm in greatest dimension  - No invasive tumor is seen at inked margins, but there is a focus of DCIS seen within approximately 1mm of the inked medial margin  - no LVI  - no LCIS  - 0/2 LN's  at least Stage IIA - pT2, pN0, cM0, G2.    - Dr Madison Yang     2018 -  Cancer Staged    Stage IIA - pT2, pN0, cM0, G2.       2019 Genomic Testing    Oncotype DX score: 13     2019 -  Hormone Therapy    anastrozole 1 mg daily as adjuvant endocrine therapy    - Dr Fredi Stern       2019 - 4/3/2019 Radiation    Course: C1    Plan ID Energy Fractions Dose per Fraction (cGy) Dose Correction (cGy) Total Dose Delivered (cGy) Elapsed Days   R Breast 10X/6X 25 / 25 200 0 5,000 40   R BRST BOOST 16E 5 / 5 200 0 1,000 7      Treatment dates:  C1: 2019 - 4/3/2019       Metastatic colon cancer to liver ([address])   2023 Genetic Testing    CARIS Results:   • KRAS Pathogenic Variant Exon 2  p.G12D : lack of benefit of cetuximab, panitumumab Level 2   • No other actionable mutation; MSI stable; TMB low   • MiFOLOXAi Results: "Decreased Benefit of FOLFOX + Bevacizumab in first line metastatic CRC. This patient may achieve improved results by receiving an alternative to FOLFOX, such as FOLFIRI, as their initial regimen. As an adjustment to frontline FOLFOXIRI following toxicity: This patient may achieve improved results by removing the oxaliplatin portion of their regimen". 7/11/2023 Initial Diagnosis    Metastatic colon cancer to liver ([address])     7/13/2023 -  Cancer Staged    Staging form: Colon and Rectum, AJCC 8th Edition  - Clinical stage from 7/13/2023: Stage Unknown (cTX, cNX, pM1) - Signed by Daquan Drake DO on 7/13/2023 7/31/2023 -  Chemotherapy    alteplase (CATHFLO), 2 mg, Intracatheter, Every 1 Minute as needed, 2 of 12 cycles  fluorouracil (ADRUCIL), 895 mg, Intravenous, Once, 2 of 12 cycles  Administration: 900 mg (7/31/2023), 900 mg (8/14/2023)  leucovorin calcium IVPB, 896 mg, Intravenous, Once, 2 of 12 cycles  Administration: 900 mg (7/31/2023), 900 mg (8/14/2023)  oxaliplatin (ELOXATIN) chemo infusion, 85 mg/m2 = 190.4 mg, Intravenous, Once, 2 of 12 cycles  Administration: 190.4 mg (7/31/2023), 190.4 mg (8/14/2023)  fluorouracil (ADRUCIL) ambulatory infusion Soln, 1,200 mg/m2/day = 5,375 mg, Intravenous, Over 46 hours, 2 of 12 cycles     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[address])   7/27/2023 Initial Diagnosis    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[address])     7/27/2023 -  Cancer Staged    Staging form: Pharynx - Oropharynx, AJCC 8th Edition  - Clinical stage from 7/27/2023: Stage III (cT1, cN3, cM0, p16+) - Signed by Anai Crawford MD on 7/27/2023  Stage prefix: Initial diagnosis           Interval History:  Patient presents for a follow up of her metastatic colon cancer and right tonsillar cancer prior to cycle 3 of FOLFOX.   Patient tolerated cycle 2 well, she reports she had  fatigue and nausea for two days after treatment, antiemetics offering mild relief. She has intermittent neuropathy and has noticed a decreased appetite. Patient is not able to finish her meals and is trying to drink ensure. Denies mouth sores, rash, fevers, CP or SOB. Patient is scheduled for labs on 8/25 prior to cycle 3. Patient tearful during office visit but prefers not to talk about anything. REVIEW OF SYSTEMS:  Please note that a 14-point review of systems was performed to include Constitutional, HEENT, Respiratory, CVS, GI, , Musculoskeletal, Integumentary, Neurologic, Rheumatologic, Endocrinologic, Psychiatric, Lymphatic, and Hematologic/Oncologic systems were reviewed and are negative unless otherwise stated in HPI. Positive and negative findings pertinent to this evaluation are incorporated into the history of present illness. ECOG PS: 0     PROBLEM LIST:  Patient Active Problem List   Diagnosis   • Primary hypertension   • Vasomotor rhinitis   • Mixed hyperlipidemia   • Screen for colon cancer   • Malignant neoplasm of right female breast ([address])   • Vitamin D deficiency   • Encounter for screening for HIV   • Prediabetes   • Cervical lymphadenopathy   • Follow-up examination   • Right thyroid nodule   • GERD (gastroesophageal reflux disease)   • Stroke [facility])   • Obesity   • Metastatic colon cancer to liver [facility])   • 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HCC)   • Poor appetite       Assessment / Plan: We will proceed with cycle 3 of  FOLFOX at current dose pending labs. Offered patient emotional support and asked if she would like to speak with our , patient declined. We discussed her decreased appetite, reviewed food may not taste the same while going through chemotherapy however, nutrition is important. Provided Ensure samples to drink in between meals. We will see patient back in the office prior to cycle 4.   Advised her to contact us for worsening symptoms. Patient aware to contact us for additional questions/concerns. I spent 30 minutes on chart review, face to face counseling time, coordination of care and documentation. Past Medical History:   has a past medical history of Anemia, Arthritis, Breast cancer ([address]) (12/17/2018), Cancer ([address]), GERD (gastroesophageal reflux disease), Hyperlipidemia, Hypertension, Obesity, Stroke ([address]), and Transaminitis (09/22/2021). PAST SURGICAL HISTORY:   has a past surgical history that includes US guided breast biopsy right complete (Right, 11/23/2018); Breast surgery; pr mastectomy partial w/axillary lymphadenectomy (Right, 12/20/2018); Tubal ligation; Breast biopsy (Right, 11/23/2018); US guided injection for research study (12/20/2018); US guided injection for research study (12/7/2018); US guided injection for research study (5/3/2019); US guided lymph node biopsy right (5/26/2023); IR biopsy liver mass (6/27/2023); pr laryngoscopy w/biopsy microscope/telescope (N/A, 7/20/2023); pr [facility] incl fluor gdnce dx w/cell washg spx (N/A, 7/20/2023); ESOPHAGOSCOPY (N/A, 7/20/2023); and IR port placement (7/28/2023).     CURRENT MEDICATIONS  Current Outpatient Medications   Medication Sig Dispense Refill   • anastrozole (ARIMIDEX) 1 mg tablet Take 1 tablet (1 mg total) by mouth daily 90 tablet 3   • atorvastatin (LIPITOR) 40 mg tablet Take 1 tablet (40 mg total) by mouth daily at bedtime 30 tablet 2   • [START ON 8/28/2023] fluorouracil 5,375 mg in CADD/Elastomeric Infusion Device Infuse 5,375 mg (1,200 mg/m2/day x 2.24 m2) into a catheter in a vein via infusion device over 46 hours for 2 days  Infusion planned for August 28, 2023. 1 each 0   • lidocaine-prilocaine (EMLA) cream Apply topically as needed for mild pain 30 g 3   • losartan (COZAAR) 50 mg tablet Take 1 tablet (50 mg total) by mouth daily 90 tablet 1   • omeprazole (PriLOSEC) 20 mg delayed release capsule Take 1 capsule (20 mg total) by mouth daily 30 capsule 5   • ondansetron (ZOFRAN) 8 mg tablet Take 1 tablet (8 mg total) by mouth every 8 (eight) hours as needed for nausea or vomiting 30 tablet 3   • polyethylene glycol (GOLYTELY) 4000 mL solution Take 4,000 mL by mouth once for 1 dose 4000 mL 0   • potassium chloride (K-DUR,KLOR-CON) 20 mEq tablet Take 1 tablet (20 mEq total) by mouth 2 (two) times a day for 7 days 14 tablet 0   • prochlorperazine (COMPAZINE) 10 mg tablet Take 1 tablet (10 mg total) by mouth every 6 (six) hours as needed for nausea or vomiting (Patient not taking: Reported on 8/3/2023) 30 tablet 3     No current facility-administered medications for this visit. [unfilled]    SOCIAL HISTORY:   reports that she has never smoked. She has never used smokeless tobacco. She reports that she does not drink alcohol and does not use drugs. FAMILY HISTORY:  family history includes Colon cancer (age of onset: 62) in her mother; Hypertension in her daughter, mother, and son; Pancreatic cancer (age of onset: 61) in her father. ALLERGIES:  has No Known Allergies. Physical Exam:  Vital Signs:   Visit Vitals  /86 (BP Location: Left arm, Patient Position: Sitting, Cuff Size: Large)   Pulse 97   Temp (!) 97.3 °F (36.3 °C) (Temporal)   Wt 116 kg (255 lb)   SpO2 98%   BMI 41.18 kg/m²   OB Status Postmenopausal   Smoking Status Never   BSA 2.22 m²     Body mass index is 41.18 kg/m². Body surface area is 2.22 meters squared. GEN: Alert, awake oriented x3, in no acute distress  HEENT- No pallor, icterus, cyanosis, no oral mucosal lesions,   LAD - no palpable cervical, clavicle, axillary, inguinal LAD  Heart- normal S1 S2, regular rate and rhythm, No murmur, rubs.    Lungs- clear breathing sound bilateral.   Abdomen- soft, Non tender, bowel sounds present  Extremities- No cyanosis, clubbing, edema  Neuro- No focal neurological deficit    Labs:  Lab Results   Component Value Date    WBC 3.38 (L) 08/19/2023    HGB 9.7 (L) 08/19/2023    HCT 31.1 (L) 08/19/2023    MCV 80 (L) 08/19/2023     08/19/2023     Lab Results   Component Value Date    SODIUM 137 08/19/2023    K 4.6 08/19/2023     08/19/2023    CO2 28 08/19/2023    AGAP 6 08/19/2023    BUN 13 08/19/2023    CREATININE 0.75 08/19/2023    GLUC 99 08/11/2023    GLUF 110 (H) 08/19/2023    CALCIUM 9.0 08/19/2023    AST 44 (H) 08/19/2023    ALT 50 08/19/2023    ALKPHOS 111 (H) 08/19/2023    TP 7.2 08/19/2023    TBILI 0.50 08/19/2023    EGFR 82 08/19/2023

## 2023-08-22 NOTE — TELEPHONE ENCOUNTER
Appointment Change  Cancel, Reschedule, Change to Virtual      Who are you speaking with? Patient   If it is not the patient, are they listed on an active communication consent form? N/A   Which provider is the appointment scheduled with? Dr. Carrillo Menendez   When is the appointment scheduled? Please list date and time  8/24/23 1:00PM   At which location is the appointment scheduled to take place? [facility]   Was the appointment rescheduled or changed from an in person visit to a virtual visit? If so, please list the details of the change. 8/23/23 10:00AM   What is the reason for the appointment change? Provider   Was [facility] transport scheduled for this visit? Yes   Does [facility] transport need to be scheduled for the new visit (if applicable) Yes   Does the patient need an infusion appointment rescheduled? No   Does the patient have an infusion appointment scheduled? If so, when? No   Is the patient undergoing chemotherapy? No   Was the no-show policy reviewed for appointments being changed with less then 24 hours of notice?  No

## 2023-08-22 NOTE — DENTAL PROCEDURE DETAILS
Berhane Rivero 80 y.o. F presents for [facility]    PMH reviewed, no changes, ASA IV.  BP and P WNL. Prophylaxis completed with ultrasonic  and hand instrumentation. Soft plaque removed and supragingival calculus removed from teeth. Polished with prophy cup and paste. Patient left satisfied and ambulatory.     NV: F composite #27

## 2023-08-22 NOTE — PROGRESS NOTES
Pt called stating she had a 10am medical oncology appt and transportation had not called or arrived yet. I reached out to  [address]  And she was not on the schedule for today. Ragini Hernandes and she stated its ok for the pt to come late if LYFT  Can be provided. [facility] confirmed they can send  LYFT  Out to the pt and get her to the [address] [place] [address]. She is aware to be outside and ready.  She was thankful for the assistance

## 2023-08-22 NOTE — PROGRESS NOTES
E mail sent to [facility] with all non treatment oncology appointments, pt was instructed to contact me if any appts change     Confirmation e mail received from  [facility] all upcoming appts are scheduled for transportation

## 2023-08-23 ENCOUNTER — OFFICE VISIT (OUTPATIENT)
Dept: SURGICAL ONCOLOGY | Facility: CLINIC | Age: 68
End: 2023-08-23
Payer: MEDICARE

## 2023-08-23 VITALS
HEART RATE: 100 BPM | TEMPERATURE: 97.4 F | DIASTOLIC BLOOD PRESSURE: 80 MMHG | WEIGHT: 254 LBS | SYSTOLIC BLOOD PRESSURE: 148 MMHG | OXYGEN SATURATION: 98 % | BODY MASS INDEX: 41.02 KG/M2

## 2023-08-23 DIAGNOSIS — C18.9 METASTATIC COLON CANCER TO LIVER (HCC): Primary | ICD-10-CM

## 2023-08-23 DIAGNOSIS — C78.7 METASTATIC COLON CANCER TO LIVER (HCC): Primary | ICD-10-CM

## 2023-08-23 DIAGNOSIS — C09.9 RIGHT TONSILLAR SQUAMOUS CELL CARCINOMA (HCC): ICD-10-CM

## 2023-08-23 PROCEDURE — 99214 OFFICE O/P EST MOD 30 MIN: CPT | Performed by: STUDENT IN AN ORGANIZED HEALTH CARE EDUCATION/TRAINING PROGRAM

## 2023-08-23 NOTE — PROGRESS NOTES
Surgical Oncology Consultation F/U    [id number] [facility] SURGICAL ONCOLOGY Due[place] Diogenestiff  [address] [facility],[address] [place] [id number] [phone]    Patient:  Carley Donaldson  1955  [id number]    Primary Care provider:  Chris Hernandez MD  [address]    Referring provider:  No referring provider defined for this encounter. Diagnoses and all orders for this visit:    Metastatic colon cancer to liver [facility])    Right tonsillar squamous cell carcinoma [facility])        Chief Complaint   Patient presents with   • Follow-up       No follow-ups on file. 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   - grade 2  - %  - PA 95%  - HER2 negative     12/20/2018 Surgery    Right partial mastectomy and SLN biopsy:  Infiltrating ductal carcinoma, Grade 2/3, felt to be between 2.0 cm and 2.5 cm in greatest dimension  - No invasive tumor is seen at inked margins, but there is a focus of DCIS seen within approximately 1mm of the inked medial margin  - no LVI  - no LCIS  - 0/2 LN's  at least Stage IIA - pT2, pN0, cM0, G2.    - Dr Korin Gray     12/20/2018 -  Cancer Staged    Stage IIA - pT2, pN0, cM0, G2.       1/4/2019 Genomic Testing    Oncotype DX score: 13     2/1/2019 -  Hormone Therapy    anastrozole 1 mg daily as adjuvant endocrine therapy    - Dr Benjamin Clement       2/14/2019 - 4/3/2019 Radiation Course: C1    Plan ID Energy Fractions Dose per Fraction (cGy) Dose Correction (cGy) Total Dose Delivered (cGy) Elapsed Days   R Breast 10X/6X 25 / 25 200 0 5,000 40   R BRST BOOST 16E 5 / 5 200 0 1,000 7      Treatment dates:  C1: 2/14/2019 - 4/3/2019       Metastatic colon cancer to liver (HCC)   7/6/2023 Genetic Testing    CARIS Results:   • KRAS Pathogenic Variant Exon 2  p.G12D : lack of benefit of cetuximab, panitumumab Level 2   • No other actionable mutation; MSI stable; TMB low   • MiFOLOXAi Results: "Decreased Benefit of FOLFOX + Bevacizumab in first line metastatic CRC. This patient may achieve improved results by receiving an alternative to FOLFOX, such as FOLFIRI, as their initial regimen. As an adjustment to frontline FOLFOXIRI following toxicity: This patient may achieve improved results by removing the oxaliplatin portion of their regimen".          7/11/2023 Initial Diagnosis    Metastatic colon cancer to liver ([address])     7/13/2023 -  Cancer Staged    Staging form: Colon and Rectum, AJCC 8th Edition  - Clinical stage from 7/13/2023: Stage Unknown (cTX, cNX, pM1) - Signed by Pb Pringle DO on 7/13/2023 7/31/2023 -  Chemotherapy    alteplase (CATHFLO), 2 mg, Intracatheter, Every 1 Minute as needed, 2 of 12 cycles  fluorouracil (ADRUCIL), 895 mg, Intravenous, Once, 2 of 12 cycles  Administration: 900 mg (7/31/2023), 900 mg (8/14/2023)  leucovorin calcium IVPB, 896 mg, Intravenous, Once, 2 of 12 cycles  Administration: 900 mg (7/31/2023), 900 mg (8/14/2023)  oxaliplatin (ELOXATIN) chemo infusion, 85 mg/m2 = 190.4 mg, Intravenous, Once, 2 of 12 cycles  Administration: 190.4 mg (7/31/2023), 190.4 mg (8/14/2023)  fluorouracil (ADRUCIL) ambulatory infusion Soln, 1,200 mg/m2/day = 5,375 mg, Intravenous, Over 46 hours, 2 of 12 cycles     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[address])   7/27/2023 Initial Diagnosis    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[address])     7/27/2023 -  Cancer Staged Staging form: Pharynx - Oropharynx, AJCC 8th Edition  - Clinical stage from 7/27/2023: Stage III (cT1, cN3, cM0, p16+) - Signed by Heriberto Stallings MD on 7/27/2023  Stage prefix: Initial diagnosis           History of Present Illness  : This is a 59-year-old female seen today with a new right neck nodule. Briefly, the patient presented to her primary care physician due to what she describes as throat soreness. A large right neck nodule was identified, prompting a CT scan of the neck. This demonstrated a near 5 cm right level 2 lymph node with multicystic components. It also detected a right heterogeneous thyroid nodule of about 2 cm which did not appear to be locally invasive. The patient denies any significant symptoms of throat pain, trouble breathing, trouble swallowing, pain with swallowing, voice changes. She does describe a sore throat for months if not years. She states that the right sided neck nodule became enlarged over the last couple of months but it has not really bothered her. She does have a history of right breast cancer treated with breast conservation in 2018. Her mammograms since that time have been benign. She denies any other lumps or bumps of the left neck, axilla, inguinal regions. She denies any fevers, chills, night sweats, weight loss, other systemic symptoms. At this juncture, given that she does not have any symptoms concerning for lymphoma and her enlarged lymph nodes are limited to the right neck where she also has a right thyroid nodule, I am concerned about a lymph node involved thyroid cancer. I would like her to undergo an FNA of the right neck node to determine a baseline histology. Likewise, she will need to undergo the thyroid ultrasound she is scheduled for as well as a potential right thyroid biopsy depending on these results. Interval 6/7/23  Patient presents today for interval follow-up after the above investigations.   Thyroid ultrasound revealed a spongiform nodule with questionable criteria for biopsy. Percutaneous FNA of the enlarged right cervical node revealed carcinoma, however, they were not able to determine tissue of etiology given the scant tissue. The patient's symptoms are largely unchanged. 8/2023  Two cycles FOLFOX completed for metastatic colon ca to liver. Also diagnosed with SCC at tonsil with mets to nodes. Plan for eventual XRT it appears. Doing well - swallowing well. Poor appetite but working on it. No abd pain, n/v, trouble with BM    Review of Systems  Complete ROS Surg Onc:   Constitutional: The patient denies new or recent history of general fatigue, no recent weight loss, no change in appetite. Eyes: No complaints of visual problems, no scleral icterus. ENT: No complaints of ear pain, no hoarseness, no difficulty swallowing,  no tinnitus and no new masses in head, oral cavity, or neck. Cardiovascular: No complaints of chest pain, no palpitations, no ankle edema. Respiratory: No complaints of shortness of breath, no cough. Gastrointestinal: No complaints of jaundice, no bloody stools, no pale stools. Genitourinary: No complaints of dysuria, no hematuria, no nocturia, no frequent urination, no urethral discharge. Musculoskeletal: No complaints of weakness, paralysis, joint stiffness or arthralgias. Integumentary: No complaints of rash, no new lesions. Neurological: No complaints of convulsions, no seizures, no dizziness. Hematologic/Lymphatic: No complaints of easy bruising. Endocrine:  No hot or cold intolerance. No polydipsia, polyphagia, or polyuria. Allergy/immunology:  No environmental allergies. No food allergies. Not immunocompromised.       Patient Active Problem List   Diagnosis   • Primary hypertension   • Vasomotor rhinitis   • Mixed hyperlipidemia   • Screen for colon cancer   • Malignant neoplasm of right female breast [facility])   • Vitamin D deficiency   • Encounter for screening for HIV   • Prediabetes   • Cervical lymphadenopathy   • Follow-up examination   • Right thyroid nodule   • GERD (gastroesophageal reflux disease)   • Stroke [facility])   • Obesity   • Metastatic colon cancer to liver [facility])   • 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HCC)   • Poor appetite     Past Medical History:   Diagnosis Date   • Anemia    • Arthritis    • Breast cancer ([address]) 12/17/2018   • Cancer ([address])     right breast, colon, liver, right tonsil   • GERD (gastroesophageal reflux disease)    • Hyperlipidemia    • Hypertension    • Obesity    • Stroke [facility])     TIA    • Transaminitis 09/22/2021     Past Surgical History:   Procedure Laterality Date   • BREAST BIOPSY Right 11/23/2018    us guided bx cancer   • BREAST SURGERY     • ESOPHAGOSCOPY N/A 7/20/2023    Procedure: ESOPHAGOSCOPY;  Surgeon: Valdemar Cohen MD;  [facility];  Service: ENT   • IR BIOPSY LIVER MASS  6/27/2023   • IR PORT PLACEMENT  7/28/2023   •  [address] INCL FLUOR GDNCE DX W/CELL WASHG [facility] N/A 7/20/2023    Procedure: BRONCHOSCOPY;  Surgeon: Valdemar Cohen MD;  [facility];  Service: ENT   • RI LARYNGOSCOPY W/BIOPSY MICROSCOPE/TELESCOPE N/A 7/20/2023    Procedure: MICRODIRECT LARYNGOSCOPY WITH BIOPSY;  Surgeon: Valdemar Cohen MD;  [facility];  Service: ENT   • RI MASTECTOMY PARTIAL W/AXILLARY LYMPHADENECTOMY Right 12/20/2018    Procedure: ULTRASOUND LOCALIZED PARTIAL MASTECTOMY W/SENTINEL NODE BIOPSY POSS AXILLARY DISSECTION;  Surgeon: Perla Chaney MD;  Location: [facility] MAIN OR;  Service: General   • TUBAL LIGATION     • US GUIDED BREAST BIOPSY RIGHT COMPLETE Right 11/23/2018   • [facility] STUDY  12/20/2018   • [facility] STUDY  12/7/2018   • US GUIDED INJECTION FOR RESEARCH STUDY  5/3/2019   • US GUIDED LYMPH NODE BIOPSY RIGHT  5/26/2023     Family History   Problem Relation Age of Onset   • Colon cancer Mother 62   • Hypertension Mother    • Pancreatic cancer Father 61   • Hypertension Daughter    • Hypertension Son      Social History     Socioeconomic History   • Marital status: Single     Spouse name: Not on file   • Number of children: Not on file   • Years of education: Not on file   • Highest education level: Not on file   Occupational History   • Not on file   Tobacco Use   • Smoking status: Never   • Smokeless tobacco: Never   • Tobacco comments:     NO TOBACCO USE   Vaping Use   • Vaping Use: Never used   Substance and Sexual Activity   • Alcohol use: No   • Drug use: No   • Sexual activity: Not on file   Other Topics Concern   • Not on file   Social History Narrative   • Not on file     Social Determinants of Health     Financial Resource Strain: Low Risk  (7/7/2022)    Overall Financial Resource Strain (CARDIA)    • Difficulty of Paying Living Expenses: Not hard at all   Food Insecurity: No Food Insecurity (7/7/2022)    Hunger Vital Sign    • Worried About Running Out of Food in the Last Year: Never true    • Ran Out of Food in the Last Year: Never true   Transportation Needs: No Transportation Needs (7/7/2022)    PRAPARE - Transportation    • Lack of Transportation (Medical): No    • Lack of Transportation (Non-Medical):  No   Physical Activity: Not on file   Stress: Not on file   Social Connections: Not on file   Intimate Partner Violence: Not on file   Housing Stability: Not on file       Current Outpatient Medications:   •  anastrozole (ARIMIDEX) 1 mg tablet, Take 1 tablet (1 mg total) by mouth daily, Disp: 90 tablet, Rfl: 3  •  atorvastatin (LIPITOR) 40 mg tablet, Take 1 tablet (40 mg total) by mouth daily at bedtime, Disp: 30 tablet, Rfl: 2  •  [START ON 8/28/2023] fluorouracil 5,375 mg in CADD/Elastomeric Infusion Device, Infuse 5,375 mg (1,200 mg/m2/day x 2.24 m2) into a catheter in a vein via infusion device over 46 hours for 2 days  Infusion planned for August 28, 2023., Disp: 1 each, Rfl: 0  •  lidocaine-prilocaine (EMLA) cream, Apply topically as needed for mild pain, Disp: 30 g, Rfl: 3  •  losartan (COZAAR) 50 mg tablet, Take 1 tablet (50 mg total) by mouth daily, Disp: 90 tablet, Rfl: 1  •  omeprazole (PriLOSEC) 20 mg delayed release capsule, Take 1 capsule (20 mg total) by mouth daily, Disp: 30 capsule, Rfl: 5  •  ondansetron (ZOFRAN) 8 mg tablet, Take 1 tablet (8 mg total) by mouth every 8 (eight) hours as needed for nausea or vomiting, Disp: 30 tablet, Rfl: 3  •  polyethylene glycol (GOLYTELY) 4000 mL solution, Take 4,000 mL by mouth once for 1 dose, Disp: 4000 mL, Rfl: 0  •  potassium chloride (K-DUR,KLOR-CON) 20 mEq tablet, Take 1 tablet (20 mEq total) by mouth 2 (two) times a day for 7 days, Disp: 14 tablet, Rfl: 0  •  prochlorperazine (COMPAZINE) 10 mg tablet, Take 1 tablet (10 mg total) by mouth every 6 (six) hours as needed for nausea or vomiting (Patient not taking: Reported on 8/3/2023), Disp: 30 tablet, Rfl: 3  No Known Allergies    Vitals:    08/23/23 0947   BP: 148/80   Pulse: 100   Temp: (!) 97.4 °F (36.3 °C)   SpO2: 98%       Physical Exam   General: Appears well, appears stated age  Skin: Warm, anicteric  HEENT: Normocephalic, atraumatic; sclera aniceteric, mucous membranes moist; cervical nodes without adenopathy. RIGHT neck firm nodule at inf margin of mandible appreciated  Cardiopulmonary: RRR, Easy WOB, no BLE edema  Abd: Flat and soft, nontender, no masses appreciated, no hepatosplenomegaly  MSK: Symmetric, no cyanosis, no overt weakness  Lymphatic: No cervical, axillary or inguinal lymphadenopathy  Neuro: Affect appropriate, no gross motor abnormalities      Pathology:  Pending    Labs: Reviewed in EPIC    Imaging  CT soft tissue neck w contrast    Result Date: 4/4/2023  Narrative: CT NECK WITH CONTRAST INDICATION:   I88.9: Nonspecific lymphadenitis, unspecified. Right-sided neck swelling for one month. COMPARISON:  None.  TECHNIQUE:  Axial, sagittal, and coronal 2D reformatted images were created from the axial source data and submitted for interpretation. Radiation dose length product (DLP) for this visit:  352 mGy-cm . This examination, like all CT scans performed in the [facility], was performed utilizing techniques to minimize radiation dose exposure, including the use of iterative reconstruction and automated exposure control. IV Contrast:  85 mL of iohexol (OMNIPAQUE) IMAGE QUALITY:  Diagnostic. FINDINGS: VISUALIZED BRAIN PARENCHYMA:  No acute intracranial pathology of the visualized brain parenchyma. VISUALIZED ORBITS: Normal visualized orbits. PARANASAL SINUSES: There is chronic opacification of the right sphenoid sinus with surrounding chronic osteitis. The medial bony wall along the petrous portion of the right internal carotid artery also appears dehiscent with the adjacent sphenoid sinus. NASAL CAVITY AND NASOPHARYNX:  Normal. SUPRAHYOID NECK:  There is mild asymmetric prominence of the right palatine tonsil with otherwise no discrete underlying nodular enhancing lesion. Lingual tonsils appear grossly unremarkable. INFRAHYOID NECK:  Aryepiglottic folds and piriform sinuses are normal.  Normal glottis and subglottic airway. THYROID GLAND:  There is a right thyroid lobe nodule measuring 1.5 x 1.7 cm on series 3, image 47. PAROTID AND SUBMANDIBULAR GLANDS:  Normal. LYMPH NODES:  There is a large right level 2A lymph node with internal cystic change measuring 2.2 x 3.7 x 4.7 cm. This results in mass effect and anterior displacement of the right submandibular gland. VASCULAR STRUCTURES:  Normal enhancement of the cervical vasculature. THORACIC INLET:  Lung apices and upper mediastinum are unremarkable. BONY STRUCTURES: No acute fracture or destructive osseous lesion. Impression: Large right level 2A lymphadenopathy as described above and suspicious for neoplasm. Correlation with histopathology is recommended.  Mild asymmetric prominence of the right palatine tonsil with otherwise no definitive nodular enhancing lesions identified along the course of the aerodigestive tract. Heterogeneous right thyroid lobe nodule. Ultrasound is recommended for further evaluation. I personally discussed this study with Guadalupe Marie on 4/4/2023 at 9:21 AM. Workstation performed: [id number]       I independently reviewed and interpreted the above laboratory and imaging data, including CT of the neck, breast history, pathology, thyroid US. ENT notes, op notes, path, med onc notes      Discussion/Summary: This is a 50-year-old female with metastatic colon ca to liver and tonsillar SCC met to nodes. On systemic treatment. Will see pt after completion of systemic therapy for colon ca and therapy for SCC of H&N to determine suitability for surgery. All questions answered.

## 2023-08-23 NOTE — LETTER
August 23, 2023     [facility], [address] [address]    Patient: Robert Acevedo   YOB: 1955   Date of Visit: 8/23/2023       Dear Dr. Flaquita Moore:    Thank you for referring Robert Acevedo to me for evaluation. Below are my notes for this consultation. If you have questions, please do not hesitate to call me. I look forward to following your patient along with you. Sincerely,        Bianka Diaz MD        CC: No Recipients    Bianka Diaz MD  8/23/2023  2:57 PM  Incomplete  Surgical Oncology Consultation F/U    [address] [facility] [place] [phone]    Patient:  Robert Acevedo  1955  [id number]    Primary Care provider:  Jordi Lind MD  [address] [place] [address]    Referring provider:  No referring provider defined for this encounter. Diagnoses and all orders for this visit:    Metastatic colon cancer to liver [facility])    Right tonsillar squamous cell carcinoma [facility])        Chief Complaint   Patient presents with   • Follow-up       No follow-ups on file. 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   - grade 2  - %  - NH 95%  - HER2 negative     12/20/2018 Surgery    Right partial mastectomy and SLN biopsy:  Infiltrating ductal carcinoma, Grade 2/3, felt to be between 2.0 cm and 2.5 cm in greatest dimension  - No invasive tumor is seen at inked margins, but there is a focus of DCIS seen within approximately 1mm of the inked medial margin  - no LVI  - no LCIS  - 0/2 LN's  at least Stage IIA - pT2, pN0, cM0, G2.    - Dr Brett Smith     12/20/2018 -  Cancer Staged    Stage IIA - pT2, pN0, cM0, G2.       1/4/2019 Genomic Testing    Oncotype DX score: 13     2/1/2019 -  Hormone Therapy    anastrozole 1 mg daily as adjuvant endocrine therapy    - Dr Melchor Wheeler       2/14/2019 - 4/3/2019 Radiation    Course: C1    Plan ID Energy Fractions Dose per Fraction (cGy) Dose Correction (cGy) Total Dose Delivered (cGy) Elapsed Days   R Breast 10X/6X 25 / 25 200 0 5,000 40   R BRST BOOST 16E 5 / 5 200 0 1,000 7      Treatment dates:  C1: 2/14/2019 - 4/3/2019       Metastatic colon cancer to liver ([address])   7/6/2023 Genetic Testing    CARIS Results:   • KRAS Pathogenic Variant Exon 2  p.G12D : lack of benefit of cetuximab, panitumumab Level 2   • No other actionable mutation; MSI stable; TMB low   • MiFOLOXAi Results: "Decreased Benefit of FOLFOX + Bevacizumab in first line metastatic CRC. This patient may achieve improved results by receiving an alternative to FOLFOX, such as FOLFIRI, as their initial regimen. As an adjustment to frontline FOLFOXIRI following toxicity: This patient may achieve improved results by removing the oxaliplatin portion of their regimen".          7/11/2023 Initial Diagnosis    Metastatic colon cancer to liver ([address])     7/13/2023 -  Cancer Staged    Staging form: Colon and Rectum, AJCC 8th Edition  - Clinical stage from 7/13/2023: Stage Unknown (cTX, cNX, pM1) - Signed by Dionne Harris DO on 7/13/2023 7/31/2023 -  Chemotherapy    alteplase (CATHFLO), 2 mg, Intracatheter, Every 1 Minute as needed, 2 of 12 cycles  fluorouracil (ADRUCIL), 895 mg, Intravenous, Once, 2 of 12 cycles  Administration: 900 mg (7/31/2023), 900 mg (8/14/2023)  leucovorin calcium IVPB, 896 mg, Intravenous, Once, 2 of 12 cycles  Administration: 900 mg (7/31/2023), 900 mg (8/14/2023)  oxaliplatin (ELOXATIN) chemo infusion, 85 mg/m2 = 190.4 mg, Intravenous, Once, 2 of 12 cycles  Administration: 190.4 mg (7/31/2023), 190.4 mg (8/14/2023)  fluorouracil (ADRUCIL) ambulatory infusion Soln, 1,200 mg/m2/day = 5,375 mg, Intravenous, Over 46 hours, 2 of 12 cycles     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[address])   7/27/2023 Initial Diagnosis    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[address])     7/27/2023 -  Cancer Staged    Staging form: Pharynx - Oropharynx, AJCC 8th Edition  - Clinical stage from 7/27/2023: Stage III (cT1, cN3, cM0, p16+) - Signed by Dionne Mckeon MD on 7/27/2023  Stage prefix: Initial diagnosis           History of Present Illness  : This is a 69-year-old female seen today with a new right neck nodule. Briefly, the patient presented to her primary care physician due to what she describes as throat soreness. A large right neck nodule was identified, prompting a CT scan of the neck. This demonstrated a near 5 cm right level 2 lymph node with multicystic components. It also detected a right heterogeneous thyroid nodule of about 2 cm which did not appear to be locally invasive. The patient denies any significant symptoms of throat pain, trouble breathing, trouble swallowing, pain with swallowing, voice changes. She does describe a sore throat for months if not years. She states that the right sided neck nodule became enlarged over the last couple of months but it has not really bothered her. She does have a history of right breast cancer treated with breast conservation in 2018. Her mammograms since that time have been benign. She denies any other lumps or bumps of the left neck, axilla, inguinal regions. She denies any fevers, chills, night sweats, weight loss, other systemic symptoms.     At this juncture, given that she does not have any symptoms concerning for lymphoma and her enlarged lymph nodes are limited to the right neck where she also has a right thyroid nodule, I am concerned about a lymph node involved thyroid cancer. I would like her to undergo an FNA of the right neck node to determine a baseline histology. Likewise, she will need to undergo the thyroid ultrasound she is scheduled for as well as a potential right thyroid biopsy depending on these results. Interval 6/7/23  Patient presents today for interval follow-up after the above investigations. Thyroid ultrasound revealed a spongiform nodule with questionable criteria for biopsy. Percutaneous FNA of the enlarged right cervical node revealed carcinoma, however, they were not able to determine tissue of etiology given the scant tissue. The patient's symptoms are largely unchanged. 8/2023  Two cycles FOLFOX completed for metastatic colon ca to liver. Also diagnosed with SCC at tonsil with mets to nodes. Plan for eventual XRT it appears. Doing well - swallowing well. Poor appetite but working on it. No abd pain, n/v, trouble with BM    Review of Systems  Complete ROS Surg Onc:   Constitutional: The patient denies new or recent history of general fatigue, no recent weight loss, no change in appetite. Eyes: No complaints of visual problems, no scleral icterus. ENT: No complaints of ear pain, no hoarseness, no difficulty swallowing,  no tinnitus and no new masses in head, oral cavity, or neck. Cardiovascular: No complaints of chest pain, no palpitations, no ankle edema. Respiratory: No complaints of shortness of breath, no cough. Gastrointestinal: No complaints of jaundice, no bloody stools, no pale stools. Genitourinary: No complaints of dysuria, no hematuria, no nocturia, no frequent urination, no urethral discharge. Musculoskeletal: No complaints of weakness, paralysis, joint stiffness or arthralgias. Integumentary: No complaints of rash, no new lesions.    Neurological: No complaints of convulsions, no seizures, no dizziness. Hematologic/Lymphatic: No complaints of easy bruising. Endocrine:  No hot or cold intolerance. No polydipsia, polyphagia, or polyuria. Allergy/immunology:  No environmental allergies. No food allergies. Not immunocompromised.       Patient Active Problem List   Diagnosis   • Primary hypertension   • Vasomotor rhinitis   • Mixed hyperlipidemia   • Screen for colon cancer   • Malignant neoplasm of right female breast ([address])   • Vitamin D deficiency   • Encounter for screening for HIV   • Prediabetes   • Cervical lymphadenopathy   • Follow-up examination   • Right thyroid nodule   • GERD (gastroesophageal reflux disease)   • Stroke [facility])   • Obesity   • Metastatic colon cancer to liver [facility])   • 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[address])   • Poor appetite     Past Medical History:   Diagnosis Date   • Anemia    • Arthritis    • Breast cancer ([address]) 12/17/2018   • Cancer ([address])     right breast, colon, liver, right tonsil   • GERD (gastroesophageal reflux disease)    • Hyperlipidemia    • Hypertension    • Obesity    • Stroke [facility])     TIA    • Transaminitis 09/22/2021     Past Surgical History:   Procedure Laterality Date   • BREAST BIOPSY Right 11/23/2018    us guided bx cancer   • BREAST SURGERY     • ESOPHAGOSCOPY N/A 7/20/2023    Procedure: ESOPHAGOSCOPY;  Surgeon: Shanti Puentes MD;  [facility];  Service: ENT   • IR BIOPSY LIVER MASS  6/27/2023   • IR PORT PLACEMENT  7/28/2023   •  [facility] INCL FLUOR GDNCE DX W/CELL WASHG [facility] N/A 7/20/2023    Procedure: BRONCHOSCOPY;  Surgeon: Shanti Puentes MD;  [facility]: [facility];  Service: ENT   • WI LARYNGOSCOPY W/BIOPSY MICROSCOPE/TELESCOPE N/A 7/20/2023    Procedure: MICRODIRECT LARYNGOSCOPY WITH BIOPSY;  Surgeon: Shanti Puentes MD;  [facility];  Service: ENT   • WI MASTECTOMY PARTIAL W/AXILLARY LYMPHADENECTOMY Right 12/20/2018    Procedure: ULTRASOUND LOCALIZED PARTIAL MASTECTOMY W/SENTINEL NODE BIOPSY POSS AXILLARY DISSECTION;  Surgeon: Nancy Reilly MD;  Location:  MAIN OR;  Service: General   • TUBAL LIGATION     • US GUIDED BREAST BIOPSY RIGHT COMPLETE Right 11/23/2018   • US GUIDED INJECTION FOR RESEARCH STUDY  12/20/2018   • US GUIDED INJECTION FOR RESEARCH STUDY  12/7/2018   • US GUIDED INJECTION FOR RESEARCH STUDY  5/3/2019   • US GUIDED LYMPH NODE BIOPSY RIGHT  5/26/2023     Family History   Problem Relation Age of Onset   • Colon cancer Mother 62   • Hypertension Mother    • Pancreatic cancer Father 61   • Hypertension Daughter    • Hypertension Son      Social History     Socioeconomic History   • Marital status: Single     Spouse name: Not on file   • Number of children: Not on file   • Years of education: Not on file   • Highest education level: Not on file   Occupational History   • Not on file   Tobacco Use   • Smoking status: Never   • Smokeless tobacco: Never   • Tobacco comments:     NO TOBACCO USE   Vaping Use   • Vaping Use: Never used   Substance and Sexual Activity   • Alcohol use: No   • Drug use: No   • Sexual activity: Not on file   Other Topics Concern   • Not on file   Social History Narrative   • Not on file     Social Determinants of Health     Financial Resource Strain: Low Risk  (7/7/2022)    Overall Financial Resource Strain (CARDIA)    • Difficulty of Paying Living Expenses: Not hard at all   Food Insecurity: No Food Insecurity (7/7/2022)    Hunger Vital Sign    • Worried About Running Out of Food in the Last Year: Never true    • Ran Out of Food in the Last Year: Never true   Transportation Needs: No Transportation Needs (7/7/2022)    PRAPARE - Transportation    • Lack of Transportation (Medical): No    • Lack of Transportation (Non-Medical):  No   Physical Activity: Not on file   Stress: Not on file   Social Connections: Not on file   Intimate Partner Violence: Not on file   Housing Stability: Not on file       Current Outpatient Medications:   •  anastrozole (ARIMIDEX) 1 mg tablet, Take 1 tablet (1 mg total) by mouth daily, Disp: 90 tablet, Rfl: 3  •  atorvastatin (LIPITOR) 40 mg tablet, Take 1 tablet (40 mg total) by mouth daily at bedtime, Disp: 30 tablet, Rfl: 2  •  [START ON 8/28/2023] fluorouracil 5,375 mg in CADD/Elastomeric Infusion Device, Infuse 5,375 mg (1,200 mg/m2/day x 2.24 m2) into a catheter in a vein via infusion device over 46 hours for 2 days  Infusion planned for August 28, 2023., Disp: 1 each, Rfl: 0  •  lidocaine-prilocaine (EMLA) cream, Apply topically as needed for mild pain, Disp: 30 g, Rfl: 3  •  losartan (COZAAR) 50 mg tablet, Take 1 tablet (50 mg total) by mouth daily, Disp: 90 tablet, Rfl: 1  •  omeprazole (PriLOSEC) 20 mg delayed release capsule, Take 1 capsule (20 mg total) by mouth daily, Disp: 30 capsule, Rfl: 5  •  ondansetron (ZOFRAN) 8 mg tablet, Take 1 tablet (8 mg total) by mouth every 8 (eight) hours as needed for nausea or vomiting, Disp: 30 tablet, Rfl: 3  •  polyethylene glycol (GOLYTELY) 4000 mL solution, Take 4,000 mL by mouth once for 1 dose, Disp: 4000 mL, Rfl: 0  •  potassium chloride (K-DUR,KLOR-CON) 20 mEq tablet, Take 1 tablet (20 mEq total) by mouth 2 (two) times a day for 7 days, Disp: 14 tablet, Rfl: 0  •  prochlorperazine (COMPAZINE) 10 mg tablet, Take 1 tablet (10 mg total) by mouth every 6 (six) hours as needed for nausea or vomiting (Patient not taking: Reported on 8/3/2023), Disp: 30 tablet, Rfl: 3  No Known Allergies    Vitals:    08/23/23 0947   BP: 148/80   Pulse: 100   Temp: (!) 97.4 °F (36.3 °C)   SpO2: 98%       Physical Exam   General: Appears well, appears stated age  Skin: Warm, anicteric  HEENT: Normocephalic, atraumatic; sclera aniceteric, mucous membranes moist; cervical nodes without adenopathy.  RIGHT neck firm nodule at inf margin of mandible appreciated  Cardiopulmonary: RRR, Easy WOB, no BLE edema  Abd: Flat and soft, nontender, no masses appreciated, no hepatosplenomegaly  MSK: Symmetric, no cyanosis, no overt weakness  Lymphatic: No cervical, axillary or inguinal lymphadenopathy  Neuro: Affect appropriate, no gross motor abnormalities      Pathology:  Pending    Labs: Reviewed in EPIC    Imaging  CT soft tissue neck w contrast    Result Date: 4/4/2023  Narrative: CT NECK WITH CONTRAST INDICATION:   I88.9: Nonspecific lymphadenitis, unspecified. Right-sided neck swelling for one month. COMPARISON:  None. TECHNIQUE:  Axial, sagittal, and coronal 2D reformatted images were created from the axial source data and submitted for interpretation. Radiation dose length product (DLP) for this visit:  352 mGy-cm . This examination, like all CT scans performed in the [facility], was performed utilizing techniques to minimize radiation dose exposure, including the use of iterative reconstruction and automated exposure control. IV Contrast:  85 mL of iohexol (OMNIPAQUE) IMAGE QUALITY:  Diagnostic. FINDINGS: VISUALIZED BRAIN PARENCHYMA:  No acute intracranial pathology of the visualized brain parenchyma. VISUALIZED ORBITS: Normal visualized orbits. PARANASAL SINUSES: There is chronic opacification of the right sphenoid sinus with surrounding chronic osteitis. The medial bony wall along the petrous portion of the right internal carotid artery also appears dehiscent with the adjacent sphenoid sinus. NASAL CAVITY AND NASOPHARYNX:  Normal. SUPRAHYOID NECK:  There is mild asymmetric prominence of the right palatine tonsil with otherwise no discrete underlying nodular enhancing lesion. Lingual tonsils appear grossly unremarkable. INFRAHYOID NECK:  Aryepiglottic folds and piriform sinuses are normal.  Normal glottis and subglottic airway. THYROID GLAND:  There is a right thyroid lobe nodule measuring 1.5 x 1.7 cm on series 3, image 47. PAROTID AND SUBMANDIBULAR GLANDS:  Normal. LYMPH NODES:  There is a large right level 2A lymph node with internal cystic change measuring 2.2 x 3.7 x 4.7 cm.   This results in mass effect and anterior displacement of the right submandibular gland. VASCULAR STRUCTURES:  Normal enhancement of the cervical vasculature. THORACIC INLET:  Lung apices and upper mediastinum are unremarkable. BONY STRUCTURES: No acute fracture or destructive osseous lesion. Impression: Large right level 2A lymphadenopathy as described above and suspicious for neoplasm. Correlation with histopathology is recommended. Mild asymmetric prominence of the right palatine tonsil with otherwise no definitive nodular enhancing lesions identified along the course of the aerodigestive tract. Heterogeneous right thyroid lobe nodule. Ultrasound is recommended for further evaluation. I personally discussed this study with Marina Norton on 4/4/2023 at 9:21 AM. Workstation performed: [id number]       I independently reviewed and interpreted the above laboratory and imaging data, including CT of the neck, breast history, pathology, thyroid US. ENT notes, op notes, path, med onc notes      Discussion/Summary: This is a 67-year-old female with metastatic colon ca to liver and tonsillar SCC met to nodes. On systemic treatment. Will see pt after completion of systemic therapy for colon ca and therapy for SCC of H&N to determine suitability for surgery. All questions answered. Federico Mora MD  8/23/2023 10:48 AM  Incomplete  Surgical Oncology Consultation F/U    [address] [facility] [address] [place] [address]  [phone]    Patient:  Orquidea Freeman  1955  [id number]    Primary Care provider:  Marina Norton MD  [facility] [address]    Referring provider:  No referring provider defined for this encounter.     Diagnoses and all orders for this visit:    Metastatic colon cancer to liver [facility])    Right tonsillar squamous cell carcinoma [facility])        Chief Complaint   Patient presents with   • Follow-up       No follow-ups on file. 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   - grade 2  - %  - NM 95%  - HER2 negative     12/20/2018 Surgery    Right partial mastectomy and SLN biopsy:  Infiltrating ductal carcinoma, Grade 2/3, felt to be between 2.0 cm and 2.5 cm in greatest dimension  - No invasive tumor is seen at inked margins, but there is a focus of DCIS seen within approximately 1mm of the inked medial margin  - no LVI  - no LCIS  - 0/2 LN's  at least Stage IIA - pT2, pN0, cM0, G2.    - Dr Edwige Coleman     12/20/2018 -  Cancer Staged    Stage IIA - pT2, pN0, cM0, G2.       1/4/2019 Genomic Testing    Oncotype DX score: 13     2/1/2019 -  Hormone Therapy    anastrozole 1 mg daily as adjuvant endocrine therapy    - Dr Garcia Gist       2/14/2019 - 4/3/2019 Radiation    Course: C1    Plan ID Energy Fractions Dose per Fraction (cGy) Dose Correction (cGy) Total Dose Delivered (cGy) Elapsed Days   R Breast 10X/6X 25 / 25 200 0 5,000 40   R BRST BOOST 16E 5 / 5 200 0 1,000 7      Treatment dates:  C1: 2/14/2019 - 4/3/2019       Metastatic colon cancer to liver ([address])   7/6/2023 Genetic Testing    CARIS Results:   • KRAS Pathogenic Variant Exon 2  p.G12D : lack of benefit of cetuximab, panitumumab Level 2   • No other actionable mutation; MSI stable; TMB low   • MiFOLOXAi Results: "Decreased Benefit of FOLFOX + Bevacizumab in first line metastatic CRC. This patient may achieve improved results by receiving an alternative to FOLFOX, such as FOLFIRI, as their initial regimen. As an adjustment to frontline FOLFOXIRI following toxicity: This patient may achieve improved results by removing the oxaliplatin portion of their regimen". 7/11/2023 Initial Diagnosis    Metastatic colon cancer to liver ([address])     7/13/2023 -  Cancer Staged    Staging form: Colon and Rectum, AJCC 8th Edition  - Clinical stage from 7/13/2023: Stage Unknown (cTX, cNX, pM1) - Signed by Roslyn Bolton DO on 7/13/2023 7/31/2023 -  Chemotherapy    alteplase (CATHFLO), 2 mg, Intracatheter, Every 1 Minute as needed, 2 of 12 cycles  fluorouracil (ADRUCIL), 895 mg, Intravenous, Once, 2 of 12 cycles  Administration: 900 mg (7/31/2023), 900 mg (8/14/2023)  leucovorin calcium IVPB, 896 mg, Intravenous, Once, 2 of 12 cycles  Administration: 900 mg (7/31/2023), 900 mg (8/14/2023)  oxaliplatin (ELOXATIN) chemo infusion, 85 mg/m2 = 190.4 mg, Intravenous, Once, 2 of 12 cycles  Administration: 190.4 mg (7/31/2023), 190.4 mg (8/14/2023)  fluorouracil (ADRUCIL) ambulatory infusion Soln, 1,200 mg/m2/day = 5,375 mg, Intravenous, Over 46 hours, 2 of 12 cycles     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[address])   7/27/2023 Initial Diagnosis    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[address])     7/27/2023 -  Cancer Staged    Staging form: Pharynx - Oropharynx, AJCC 8th Edition  - Clinical stage from 7/27/2023: Stage III (cT1, cN3, cM0, p16+) - Signed by Joe Wade MD on 7/27/2023  Stage prefix: Initial diagnosis           History of Present Illness  : This is a 58-year-old female seen today with a new right neck nodule. Briefly, the patient presented to her primary care physician due to what she describes as throat soreness. A large right neck nodule was identified, prompting a CT scan of the neck.   This demonstrated a near 5 cm right level 2 lymph node with multicystic components. It also detected a right heterogeneous thyroid nodule of about 2 cm which did not appear to be locally invasive. The patient denies any significant symptoms of throat pain, trouble breathing, trouble swallowing, pain with swallowing, voice changes. She does describe a sore throat for months if not years. She states that the right sided neck nodule became enlarged over the last couple of months but it has not really bothered her. She does have a history of right breast cancer treated with breast conservation in 2018. Her mammograms since that time have been benign. She denies any other lumps or bumps of the left neck, axilla, inguinal regions. She denies any fevers, chills, night sweats, weight loss, other systemic symptoms. At this juncture, given that she does not have any symptoms concerning for lymphoma and her enlarged lymph nodes are limited to the right neck where she also has a right thyroid nodule, I am concerned about a lymph node involved thyroid cancer. I would like her to undergo an FNA of the right neck node to determine a baseline histology. Likewise, she will need to undergo the thyroid ultrasound she is scheduled for as well as a potential right thyroid biopsy depending on these results. Interval 6/7/23  Patient presents today for interval follow-up after the above investigations. Thyroid ultrasound revealed a spongiform nodule with questionable criteria for biopsy. Percutaneous FNA of the enlarged right cervical node revealed carcinoma, however, they were not able to determine tissue of etiology given the scant tissue. The patient's symptoms are largely unchanged. Review of Systems  Complete ROS Surg Onc:   Constitutional: The patient denies new or recent history of general fatigue, no recent weight loss, no change in appetite. Eyes: No complaints of visual problems, no scleral icterus.    ENT: No complaints of ear pain, no hoarseness, no difficulty swallowing,  no tinnitus and no new masses in head, oral cavity, or neck. Cardiovascular: No complaints of chest pain, no palpitations, no ankle edema. Respiratory: No complaints of shortness of breath, no cough. Gastrointestinal: No complaints of jaundice, no bloody stools, no pale stools. Genitourinary: No complaints of dysuria, no hematuria, no nocturia, no frequent urination, no urethral discharge. Musculoskeletal: No complaints of weakness, paralysis, joint stiffness or arthralgias. Integumentary: No complaints of rash, no new lesions. Neurological: No complaints of convulsions, no seizures, no dizziness. Hematologic/Lymphatic: No complaints of easy bruising. Endocrine:  No hot or cold intolerance. No polydipsia, polyphagia, or polyuria. Allergy/immunology:  No environmental allergies. No food allergies. Not immunocompromised.       Patient Active Problem List   Diagnosis   • Primary hypertension   • Vasomotor rhinitis   • Mixed hyperlipidemia   • Screen for colon cancer   • Malignant neoplasm of right female breast ([address] [facility])   • Vitamin D deficiency   • Encounter for screening for HIV   • Prediabetes   • Cervical lymphadenopathy   • Follow-up examination   • Right thyroid nodule   • GERD (gastroesophageal reflux disease)   • Stroke [facility])   • Obesity   • Metastatic colon cancer to liver [facility])   • 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[address] [facility])   • Poor appetite     Past Medical History:   Diagnosis Date   • Anemia    • Arthritis    • Breast cancer ([address]) 12/17/2018   • Cancer ([address])     right breast, colon, liver, right tonsil   • GERD (gastroesophageal reflux disease)    • Hyperlipidemia    • Hypertension    • Obesity    • Stroke [facility])     TIA    • Transaminitis 09/22/2021     Past Surgical History:   Procedure Laterality Date   • BREAST BIOPSY Right 11/23/2018    us guided bx cancer   • BREAST SURGERY     • ESOPHAGOSCOPY N/A 7/20/2023    Procedure: ESOPHAGOSCOPY;  Surgeon: Mike Verdugo MD;  Location: [facility] OR;  Service: ENT   • IR BIOPSY LIVER MASS  6/27/2023   • IR PORT PLACEMENT  7/28/2023   •  [address] INCL FLUOR GDNCE DX W/CELL WASHG SPX N/A 7/20/2023    Procedure: BRONCHOSCOPY;  Surgeon: Mike Verdugo MD;  Location: [facility] OR;  Service: ENT   • WI LARYNGOSCOPY W/BIOPSY MICROSCOPE/TELESCOPE N/A 7/20/2023    Procedure: MICRODIRECT LARYNGOSCOPY WITH BIOPSY;  Surgeon: Mike Verdugo MD;  Location: [facility];  Service: ENT   • WI MASTECTOMY PARTIAL W/AXILLARY LYMPHADENECTOMY Right 12/20/2018    Procedure: ULTRASOUND LOCALIZED PARTIAL MASTECTOMY W/SENTINEL NODE BIOPSY POSS AXILLARY DISSECTION;  Surgeon: Jacinta Dow MD;  Location: [facility] OR;  Service: General   • TUBAL LIGATION     • US GUIDED BREAST BIOPSY RIGHT COMPLETE Right 11/23/2018   • US GUIDED INJECTION FOR RESEARCH STUDY  12/20/2018   • US GUIDED INJECTION FOR RESEARCH STUDY  12/7/2018   • US GUIDED INJECTION FOR RESEARCH STUDY  5/3/2019   • US GUIDED LYMPH NODE BIOPSY RIGHT  5/26/2023     Family History   Problem Relation Age of Onset   • Colon cancer Mother 62   • Hypertension Mother    • Pancreatic cancer Father 61   • Hypertension Daughter    • Hypertension Son      Social History     Socioeconomic History   • Marital status: Single     Spouse name: Not on file   • Number of children: Not on file   • Years of education: Not on file   • Highest education level: Not on file   Occupational History   • Not on file   Tobacco Use   • Smoking status: Never   • Smokeless tobacco: Never   • Tobacco comments:     NO TOBACCO USE   Vaping Use   • Vaping Use: Never used   Substance and Sexual Activity   • Alcohol use: No   • Drug use: No   • Sexual activity: Not on file   Other Topics Concern   • Not on file   Social History Narrative   • Not on file     Social Determinants of Health     Financial Resource Strain: Low Risk  (7/7/2022)    Overall Financial Resource Strain (CARDIA)    • Difficulty of Paying Living Expenses: Not hard at all   Food Insecurity: No Food Insecurity (7/7/2022)    Hunger Vital Sign    • Worried About Running Out of Food in the Last Year: Never true    • Ran Out of Food in the Last Year: Never true   Transportation Needs: No Transportation Needs (7/7/2022)    PRAPARE - Transportation    • Lack of Transportation (Medical): No    • Lack of Transportation (Non-Medical):  No   Physical Activity: Not on file   Stress: Not on file   Social Connections: Not on file   Intimate Partner Violence: Not on file   Housing Stability: Not on file       Current Outpatient Medications:   •  anastrozole (ARIMIDEX) 1 mg tablet, Take 1 tablet (1 mg total) by mouth daily, Disp: 90 tablet, Rfl: 3  •  atorvastatin (LIPITOR) 40 mg tablet, Take 1 tablet (40 mg total) by mouth daily at bedtime, Disp: 30 tablet, Rfl: 2  •  [START ON 8/28/2023] fluorouracil 5,375 mg in CADD/Elastomeric Infusion Device, Infuse 5,375 mg (1,200 mg/m2/day x 2.24 m2) into a catheter in a vein via infusion device over 46 hours for 2 days  Infusion planned for August 28, 2023., Disp: 1 each, Rfl: 0  •  lidocaine-prilocaine (EMLA) cream, Apply topically as needed for mild pain, Disp: 30 g, Rfl: 3  •  losartan (COZAAR) 50 mg tablet, Take 1 tablet (50 mg total) by mouth daily, Disp: 90 tablet, Rfl: 1  •  omeprazole (PriLOSEC) 20 mg delayed release capsule, Take 1 capsule (20 mg total) by mouth daily, Disp: 30 capsule, Rfl: 5  •  ondansetron (ZOFRAN) 8 mg tablet, Take 1 tablet (8 mg total) by mouth every 8 (eight) hours as needed for nausea or vomiting, Disp: 30 tablet, Rfl: 3  •  polyethylene glycol (GOLYTELY) 4000 mL solution, Take 4,000 mL by mouth once for 1 dose, Disp: 4000 mL, Rfl: 0  •  potassium chloride (K-DUR,KLOR-CON) 20 mEq tablet, Take 1 tablet (20 mEq total) by mouth 2 (two) times a day for 7 days, Disp: 14 tablet, Rfl: 0  •  prochlorperazine (COMPAZINE) 10 mg tablet, Take 1 tablet (10 mg total) by mouth every 6 (six) hours as needed for nausea or vomiting (Patient not taking: Reported on 8/3/2023), Disp: 30 tablet, Rfl: 3  No Known Allergies    Vitals:    08/23/23 0947   BP: 148/80   Pulse: 100   Temp: (!) 97.4 °F (36.3 °C)   SpO2: 98%       Physical Exam   General: Appears well, appears stated age  Skin: Warm, anicteric  HEENT: Normocephalic, atraumatic; sclera aniceteric, mucous membranes moist; cervical nodes without adenopathy. RIGHT neck firm nodule at inf margin of mandible appreciated  Cardiopulmonary: RRR, Easy WOB, no BLE edema  Abd: Flat and soft, nontender, no masses appreciated, no hepatosplenomegaly  MSK: Symmetric, no cyanosis, no overt weakness  Lymphatic: No cervical, axillary or inguinal lymphadenopathy  Neuro: Affect appropriate, no gross motor abnormalities      Pathology:  Pending    Labs: Reviewed in EPIC    Imaging  CT soft tissue neck w contrast    Result Date: 4/4/2023  Narrative: CT NECK WITH CONTRAST INDICATION:   I88.9: Nonspecific lymphadenitis, unspecified. Right-sided neck swelling for one month. COMPARISON:  None. TECHNIQUE:  Axial, sagittal, and coronal 2D reformatted images were created from the axial source data and submitted for interpretation. Radiation dose length product (DLP) for this visit:  352 mGy-cm . This examination, like all CT scans performed in the [facility], was performed utilizing techniques to minimize radiation dose exposure, including the use of iterative reconstruction and automated exposure control. IV Contrast:  85 mL of iohexol (OMNIPAQUE) IMAGE QUALITY:  Diagnostic. FINDINGS: VISUALIZED BRAIN PARENCHYMA:  No acute intracranial pathology of the visualized brain parenchyma. VISUALIZED ORBITS: Normal visualized orbits. PARANASAL SINUSES: There is chronic opacification of the right sphenoid sinus with surrounding chronic osteitis. The medial bony wall along the petrous portion of the right internal carotid artery also appears dehiscent with the adjacent sphenoid sinus.    NASAL CAVITY AND NASOPHARYNX: Normal. SUPRAHYOID NECK:  There is mild asymmetric prominence of the right palatine tonsil with otherwise no discrete underlying nodular enhancing lesion. Lingual tonsils appear grossly unremarkable. INFRAHYOID NECK:  Aryepiglottic folds and piriform sinuses are normal.  Normal glottis and subglottic airway. THYROID GLAND:  There is a right thyroid lobe nodule measuring 1.5 x 1.7 cm on series 3, image 47. PAROTID AND SUBMANDIBULAR GLANDS:  Normal. LYMPH NODES:  There is a large right level 2A lymph node with internal cystic change measuring 2.2 x 3.7 x 4.7 cm. This results in mass effect and anterior displacement of the right submandibular gland. VASCULAR STRUCTURES:  Normal enhancement of the cervical vasculature. THORACIC INLET:  Lung apices and upper mediastinum are unremarkable. BONY STRUCTURES: No acute fracture or destructive osseous lesion. Impression: Large right level 2A lymphadenopathy as described above and suspicious for neoplasm. Correlation with histopathology is recommended. Mild asymmetric prominence of the right palatine tonsil with otherwise no definitive nodular enhancing lesions identified along the course of the aerodigestive tract. Heterogeneous right thyroid lobe nodule. Ultrasound is recommended for further evaluation. I personally discussed this study with Tate Kenny on 4/4/2023 at 9:21 AM. Workstation performed: [id number]       I independently reviewed and interpreted the above laboratory and imaging data, including CT of the neck, breast history, pathology, thyroid US      Discussion/Summary: This is a 16-year-old female with a large level 2 malignant appearing lymph node. FNA of this node was nonrevealing for tissue etiology, but it did reveal carcinoma. At this juncture, I would like to pursue PET scan, as the thyroid nodule within the thyroid was not particularly concerning for malignancy.   Likewise, I would like to do an incisional biopsy to obtain more tissue to determine tissue of etiology. The risks of the procedure were described to include infection, bleeding, seroma formation, temporary or permanent nerve involvement. The patient understands these risks and would like to proceed.

## 2023-08-24 ENCOUNTER — OFFICE VISIT (OUTPATIENT)
Dept: DENTISTRY | Facility: CLINIC | Age: 68
End: 2023-08-24

## 2023-08-24 VITALS — DIASTOLIC BLOOD PRESSURE: 80 MMHG | SYSTOLIC BLOOD PRESSURE: 154 MMHG | HEART RATE: 91 BPM

## 2023-08-24 DIAGNOSIS — K02.9 CARIES: Primary | ICD-10-CM

## 2023-08-24 PROBLEM — D64.9 ANEMIA: Status: ACTIVE | Noted: 2023-08-24

## 2023-08-24 PROCEDURE — D2330 RESIN-BASED COMPOSITE - 1 SURFACE, ANTERIOR: HCPCS

## 2023-08-24 NOTE — DENTAL PROCEDURE DETAILS
Adrianna Martinez is a 80 y.o. female who presents for #27 F composite restoration. Pt pain score: 0 out of 10. Reviewed past medical history, allergies, and medications. Pt denies any changes. 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 Pt is ASA IV. Examined #27 to verify prescribed tx is appropriate. Decay was detectable visually. Anticipated prognosis: fair. Due to pt receiving head and neck radiation, Dr. Skylar Baca was consulted regarding whether restoration is worth doing or if teeth should simply be extracted. Dr. Skylar Baca determined #22 and #27 can be retained for partial denture support, but all other teeth have guarded or poor prognosis and should be extracted. If #22 and #27 were to fail, consider RCT and utilizing them for denture attachment. Discussed risks, benefits, and alternatives including no tx. Risks highlighted include: pulpal exposure associated with all restorative procedures (anticipated risk: low), need to revise tx plan based on extent of decay. Pt given opportunity to ask questions, questions were answered to degree of medical and dental certainty. Pt understood and gave verbal consent. Applied 20% benzocaine topical anesthetic to injection site(s) for > 1 min. Local anesthesia administered:  1 carpule(s) of 4% articaine with 1:100k epi via buccal infiltration #27 . Removed existing composite restoration with high speed handpiece(s) and #245 carbide bur(s). Removed decay with slow speed handpiece(s) and #4 round bur(s). Verified removal of decay with caries explorer. Etched with 37% phosphoric acid etch, rinsed and gently air dried. Applied Vivapen bond, gently air dried and light cured. Restored with Tetric packable A2 composite, light cured. Polished with flame shaped white stone bur(s). Checked margins with explorer.     Pt was given option to have extractions performed in the clinic or have a referral to OMFS, it depended on whether pt believed she could tolerate the procedure awake with just local anesthesia. Pt was informed if done in the clinic, the extractions would be divided into one side at a time. Pt left ambulatory and alert. NV: extractions #21, 23 (caution: not consecutive teeth). NNV: extractions #28, 29. Attending: Dr. Micheline Ferreira was present and examined radiographs.

## 2023-08-25 ENCOUNTER — HOSPITAL ENCOUNTER (OUTPATIENT)
Dept: INFUSION CENTER | Facility: CLINIC | Age: 68
End: 2023-08-25
Payer: MEDICARE

## 2023-08-25 DIAGNOSIS — C78.7 METASTATIC COLON CANCER TO LIVER (HCC): Primary | ICD-10-CM

## 2023-08-25 DIAGNOSIS — C18.9 METASTATIC COLON CANCER TO LIVER (HCC): Primary | ICD-10-CM

## 2023-08-25 LAB
ALBUMIN SERPL BCP-MCNC: 3.6 G/DL (ref 3.5–5)
ALP SERPL-CCNC: 104 U/L (ref 34–104)
ALT SERPL W P-5'-P-CCNC: 70 U/L (ref 7–52)
ANION GAP SERPL CALCULATED.3IONS-SCNC: 8 MMOL/L
AST SERPL W P-5'-P-CCNC: 72 U/L (ref 13–39)
BASOPHILS # BLD AUTO: 0.04 THOUSANDS/ÂΜL (ref 0–0.1)
BASOPHILS NFR BLD AUTO: 1 % (ref 0–1)
BILIRUB SERPL-MCNC: 0.4 MG/DL (ref 0.2–1)
BUN SERPL-MCNC: 16 MG/DL (ref 5–25)
CALCIUM SERPL-MCNC: 9 MG/DL (ref 8.4–10.2)
CEA SERPL-MCNC: 17.5 NG/ML (ref 0–3)
CHLORIDE SERPL-SCNC: 104 MMOL/L (ref 96–108)
CO2 SERPL-SCNC: 24 MMOL/L (ref 21–32)
CREAT SERPL-MCNC: 0.95 MG/DL (ref 0.6–1.3)
EOSINOPHIL # BLD AUTO: 0.13 THOUSAND/ÂΜL (ref 0–0.61)
EOSINOPHIL NFR BLD AUTO: 3 % (ref 0–6)
ERYTHROCYTE [DISTWIDTH] IN BLOOD BY AUTOMATED COUNT: 15.6 % (ref 11.6–15.1)
GFR SERPL CREATININE-BSD FRML MDRD: 62 ML/MIN/1.73SQ M
GLUCOSE SERPL-MCNC: 113 MG/DL (ref 65–140)
HCT VFR BLD AUTO: 28.1 % (ref 34.8–46.1)
HGB BLD-MCNC: 8.9 G/DL (ref 11.5–15.4)
IMM GRANULOCYTES # BLD AUTO: 0.02 THOUSAND/UL (ref 0–0.2)
IMM GRANULOCYTES NFR BLD AUTO: 0 % (ref 0–2)
LYMPHOCYTES # BLD AUTO: 1.83 THOUSANDS/ÂΜL (ref 0.6–4.47)
LYMPHOCYTES NFR BLD AUTO: 37 % (ref 14–44)
MCH RBC QN AUTO: 25.4 PG (ref 26.8–34.3)
MCHC RBC AUTO-ENTMCNC: 31.7 G/DL (ref 31.4–37.4)
MCV RBC AUTO: 80 FL (ref 82–98)
MONOCYTES # BLD AUTO: 0.56 THOUSAND/ÂΜL (ref 0.17–1.22)
MONOCYTES NFR BLD AUTO: 11 % (ref 4–12)
NEUTROPHILS # BLD AUTO: 2.42 THOUSANDS/ÂΜL (ref 1.85–7.62)
NEUTS SEG NFR BLD AUTO: 48 % (ref 43–75)
NRBC BLD AUTO-RTO: 0 /100 WBCS
PLATELET # BLD AUTO: 276 THOUSANDS/UL (ref 149–390)
PMV BLD AUTO: 10.2 FL (ref 8.9–12.7)
POTASSIUM SERPL-SCNC: 3.6 MMOL/L (ref 3.5–5.3)
PROT SERPL-MCNC: 7.3 G/DL (ref 6.4–8.4)
RBC # BLD AUTO: 3.5 MILLION/UL (ref 3.81–5.12)
SODIUM SERPL-SCNC: 136 MMOL/L (ref 135–147)
WBC # BLD AUTO: 5 THOUSAND/UL (ref 4.31–10.16)

## 2023-08-25 PROCEDURE — 36593 DECLOT VASCULAR DEVICE: CPT

## 2023-08-25 PROCEDURE — 82378 CARCINOEMBRYONIC ANTIGEN: CPT | Performed by: INTERNAL MEDICINE

## 2023-08-25 PROCEDURE — 85025 COMPLETE CBC W/AUTO DIFF WBC: CPT | Performed by: INTERNAL MEDICINE

## 2023-08-25 PROCEDURE — 80053 COMPREHEN METABOLIC PANEL: CPT | Performed by: INTERNAL MEDICINE

## 2023-08-25 RX ADMIN — ALTEPLASE 2 MG: 2.2 INJECTION, POWDER, LYOPHILIZED, FOR SOLUTION INTRAVENOUS at 13:12

## 2023-08-25 NOTE — PROGRESS NOTES
Pt presents for central labs. Port accessed, no blood return noted. Cath arlene instilled for approximately 40 minutes, positive blood return noted, labs collected per protocol. Pt declined AVS, aware of future appts.

## 2023-08-28 ENCOUNTER — HOSPITAL ENCOUNTER (OUTPATIENT)
Dept: INFUSION CENTER | Facility: CLINIC | Age: 68
Discharge: HOME | End: 2023-08-28
Payer: MEDICARE

## 2023-08-28 VITALS
WEIGHT: 261.02 LBS | HEIGHT: 66 IN | HEART RATE: 68 BPM | DIASTOLIC BLOOD PRESSURE: 86 MMHG | RESPIRATION RATE: 18 BRPM | TEMPERATURE: 97.4 F | BODY MASS INDEX: 41.95 KG/M2 | SYSTOLIC BLOOD PRESSURE: 146 MMHG

## 2023-08-28 DIAGNOSIS — C78.7 METASTATIC COLON CANCER TO LIVER (HCC): Primary | ICD-10-CM

## 2023-08-28 DIAGNOSIS — C18.9 METASTATIC COLON CANCER TO LIVER (HCC): Primary | ICD-10-CM

## 2023-08-28 PROCEDURE — G0498 CHEMO EXTEND IV INFUS W/PUMP: HCPCS

## 2023-08-28 PROCEDURE — 96367 TX/PROPH/DG ADDL SEQ IV INF: CPT

## 2023-08-28 PROCEDURE — 96415 CHEMO IV INFUSION ADDL HR: CPT

## 2023-08-28 PROCEDURE — 96368 THER/DIAG CONCURRENT INF: CPT

## 2023-08-28 PROCEDURE — 96411 CHEMO IV PUSH ADDL DRUG: CPT

## 2023-08-28 PROCEDURE — 96413 CHEMO IV INFUSION 1 HR: CPT

## 2023-08-28 RX ORDER — FLUOROURACIL 50 MG/ML
900 INJECTION, SOLUTION INTRAVENOUS ONCE
Status: COMPLETED | OUTPATIENT
Start: 2023-08-28 | End: 2023-08-28

## 2023-08-28 RX ORDER — DEXTROSE MONOHYDRATE 50 MG/ML
20 INJECTION, SOLUTION INTRAVENOUS ONCE
Status: COMPLETED | OUTPATIENT
Start: 2023-08-28 | End: 2023-08-28

## 2023-08-28 RX ADMIN — DEXTROSE 20 ML/HR: 5 SOLUTION INTRAVENOUS at 08:31

## 2023-08-28 RX ADMIN — LEUCOVORIN CALCIUM 900 MG: 100 INJECTION, POWDER, LYOPHILIZED, FOR SUSPENSION INTRAMUSCULAR; INTRAVENOUS at 09:09

## 2023-08-28 RX ADMIN — FLUOROURACIL 900 MG: 50 INJECTION, SOLUTION INTRAVENOUS at 11:18

## 2023-08-28 RX ADMIN — OXALIPLATIN 200 MG: 5 INJECTION, SOLUTION INTRAVENOUS at 09:09

## 2023-08-28 RX ADMIN — DEXAMETHASONE SODIUM PHOSPHATE: 10 INJECTION, SOLUTION INTRAMUSCULAR; INTRAVENOUS at 08:31

## 2023-08-28 NOTE — PROGRESS NOTES
Patient tolerated infusion without incidents. BRANDIE pump connected to patient, aware of when to return for disconnect. AVS printed.

## 2023-08-30 ENCOUNTER — HOSPITAL ENCOUNTER (OUTPATIENT)
Dept: RADIOLOGY | Facility: HOSPITAL | Age: 68
Discharge: HOME | End: 2023-08-30
Attending: INTERNAL MEDICINE

## 2023-08-30 ENCOUNTER — HOSPITAL ENCOUNTER (OUTPATIENT)
Dept: RADIOLOGY | Facility: HOSPITAL | Age: 68
Discharge: HOME | End: 2023-08-30

## 2023-08-30 ENCOUNTER — TELEPHONE (OUTPATIENT)
Dept: HEMATOLOGY ONCOLOGY | Facility: CLINIC | Age: 68
End: 2023-08-30

## 2023-08-30 ENCOUNTER — HOSPITAL ENCOUNTER (OUTPATIENT)
Dept: INFUSION CENTER | Facility: CLINIC | Age: 68
Discharge: HOME | End: 2023-08-30

## 2023-08-30 VITALS
DIASTOLIC BLOOD PRESSURE: 77 MMHG | HEART RATE: 60 BPM | SYSTOLIC BLOOD PRESSURE: 131 MMHG | TEMPERATURE: 97.5 F | RESPIRATION RATE: 18 BRPM

## 2023-08-30 DIAGNOSIS — C18.9 METASTATIC COLON CANCER TO LIVER (HCC): ICD-10-CM

## 2023-08-30 DIAGNOSIS — C18.9 METASTATIC COLON CANCER TO LIVER (HCC): Primary | ICD-10-CM

## 2023-08-30 DIAGNOSIS — C78.7 METASTATIC COLON CANCER TO LIVER (HCC): Primary | ICD-10-CM

## 2023-08-30 DIAGNOSIS — C78.7 METASTATIC COLON CANCER TO LIVER (HCC): ICD-10-CM

## 2023-08-30 NOTE — PROGRESS NOTES
Pt. Denied new symptoms or concerns today. 5FU Elastomeric pump completed over 46 hours as ordered. Port flushed with excellent blood return. The incision line above pt's port appears to have visible granulation tissue, questionable healing. I reported to Dr Alex Patiño via Claudeen Acres RN with photo included via TEAMS. Hanane will call patient. Dr Alex Patiño would like her port evaluated in IR. I told patient to keep this questionable incision line clean and dry, A and D ointment would be ok to apply. Future appointments reviewed AVS declined.

## 2023-08-30 NOTE — TELEPHONE ENCOUNTER
Left detailed VM for patient to let her know that Panchito Taylor RN sent a photo of her port site and we would like her to have this evaluated by IR. I told her that they will be calling and will schedule the appt. I left my direct teams number for any further questions or concerns.

## 2023-08-30 NOTE — DENTAL PROCEDURE DETAILS
Yane Madera 80 y.o. F presents for [facility]     PMH reviewed, no changes, ASA IV.  BP and P WNL. Prophylaxis completed with ultrasonic  and hand instrumentation. Soft plaque removed and supragingival calculus removed from teeth. Polished with prophy cup and paste. Patient left satisfied and ambulatory.      NV: F composite #27

## 2023-08-30 NOTE — PROGRESS NOTES
Patient evaluated after concern raised from infusion nurse about granulation tissue present on the port insertion site. Port initially placed 7/28/2023. Upon evaluation, port site is healing well. It appears to be a small scab or possibly glue on the right hand side causing the abnormal appearance. This area was left intact and presumably will fall off in the next few days. Neck puncture site healing as well. Patient denies any fever, chills, or SOB. No concern for infection or dehiscence of port. Of note, patient expressed some dificulty with accessing the port and getting blood and multiple flushes required. Attempts at accessing are being made with the patient sitting up. It may be easier to access flat or HOB slightly elevated. Given patients body habitus, the catheter may be slightly retracted and not in ideal positioning for blood aspiration.          [address] [facility]

## 2023-09-01 ENCOUNTER — OFFICE VISIT (OUTPATIENT)
Dept: DENTISTRY | Facility: CLINIC | Age: 68
End: 2023-09-01

## 2023-09-01 VITALS — HEART RATE: 85 BPM | SYSTOLIC BLOOD PRESSURE: 135 MMHG | DIASTOLIC BLOOD PRESSURE: 84 MMHG

## 2023-09-01 DIAGNOSIS — K02.9 TOOTH DECAYED: ICD-10-CM

## 2023-09-01 DIAGNOSIS — Z98.890 HISTORY OF ORAL SURGERY: Primary | ICD-10-CM

## 2023-09-01 DIAGNOSIS — M85.80 BONE LOSS: ICD-10-CM

## 2023-09-01 PROCEDURE — D7140 EXTRACTION, ERUPTED TOOTH OR EXPOSED ROOT (ELEVATION AND/OR FORCEPS REMOVAL): HCPCS

## 2023-09-01 RX ORDER — AMOXICILLIN AND CLAVULANATE POTASSIUM 875; 125 MG/1; MG/1
1 TABLET, FILM COATED ORAL EVERY 12 HOURS SCHEDULED
Qty: 14 TABLET | Refills: 0 | Status: SHIPPED | OUTPATIENT
Start: 2023-09-01 | End: 2023-09-08

## 2023-09-01 NOTE — DENTAL PROCEDURE DETAILS
Patient presents for extraction #21, 23, 28, and 29 and verbally consents for treatment:  Reviewed health history-  Pt is ASA type II/III  Treatment consents signed: Yes  Perio: Periodontitis  Pain Scale: 0  Caries Assessment: Medium    Radiographs: Films are current  Oral Hygiene instruction reviewed and given  Hygiene recall visits recommended to the patient      Universal Protocol    Other Assisting Provider: Nidia Donato (assistant)    Verbal consent obtained? YES  Written consent obtained? YES    Risks, benefits and alternatives discussed?: YES    Consent given by: Patient ()    Time Out  Immediately prior to the procedure a time out was called: YES    Time Out:  2:15pm    A time out verifies correct patient, procedure, equipment, support staff and site/side marked as required. Patient states understanding of procedure being performed: YES    Patient's understanding of procedure matches consent: YES    Procedure consent matches procedure scheduled: YES    Test results available and properly labeled: N/A    Site  Verified with the patient  YES    Radiology Images displayed and confirmed. If images not available, report reviewed:  YES    Required items - Required blood products, implants, devices and special equipment available: YES    Patient identity confirmed:  YES      Oral Surgery    Berhane Rivero presents for Ext #21, 23, 29, and 34    [address] [facility], patient denies any changes. Obtained a direct and personal consent. Risks and complications were explained. Pt agreed and consented. Consent scanned in doc center. Pre-Op BP WNL. Administered 2 cc of 4% articaine w/ 1:100,000 epi via buccal and lingual infiltration. Adequate anesthesia obtained, reflected gingiva, elevated, and extracted #21, 23, 28, and 29. Socket irrigated. Upon dismissal, patient received POI, ice, gauze, and RX: amoxicillin and tylenol/ibuprofen prn. Prescription for amoxicillin sent to pt pharmacy. Confirmed no allergies.     Pt only has 22 and 27 remaining now, which would be used for her partial denture. Confirmed with pt and made sure that she understood that after radiation therapy, any work that would be done to those 2 teeth would involve saving them vs extraction. Pt understood and confirmed that she would like to keep those two teeth. Paperwork signed by Dr. Bony Elmore and Dr. Tammy Galvez confirming clearance for radiation tx 20 days after today's extractions. Form faxed to physician office, scanned for our records, and returned to pt. Answered all pt questions. Pt left satisfied. Prognosis is Good.    Referrals Needed: No    Next visit:  evaluation / lower impressions post radiation tx  Attending: Dr. Bony Elmore

## 2023-09-05 ENCOUNTER — APPOINTMENT (OUTPATIENT)
Dept: LAB | Facility: HOSPITAL | Age: 68
End: 2023-09-05
Payer: MEDICARE

## 2023-09-05 DIAGNOSIS — D64.9 ANEMIA, UNSPECIFIED TYPE: ICD-10-CM

## 2023-09-05 DIAGNOSIS — C78.7 METASTATIC COLON CANCER TO LIVER (HCC): ICD-10-CM

## 2023-09-05 DIAGNOSIS — C18.9 METASTATIC COLON CANCER TO LIVER (HCC): ICD-10-CM

## 2023-09-05 LAB
FERRITIN SERPL-MCNC: 102 NG/ML (ref 11–307)
IRON SATN MFR SERPL: 9 % (ref 15–50)
IRON SERPL-MCNC: 28 UG/DL (ref 50–212)
TIBC SERPL-MCNC: 319 UG/DL (ref 250–450)
UIBC SERPL-MCNC: 291 UG/DL (ref 155–355)

## 2023-09-05 PROCEDURE — 83540 ASSAY OF IRON: CPT

## 2023-09-05 PROCEDURE — 83550 IRON BINDING TEST: CPT

## 2023-09-05 PROCEDURE — 82728 ASSAY OF FERRITIN: CPT

## 2023-09-05 PROCEDURE — 36415 COLL VENOUS BLD VENIPUNCTURE: CPT

## 2023-09-06 ENCOUNTER — PATIENT OUTREACH (OUTPATIENT)
Dept: HEMATOLOGY ONCOLOGY | Facility: CLINIC | Age: 68
End: 2023-09-06

## 2023-09-06 DIAGNOSIS — C18.9 METASTATIC COLON CANCER TO LIVER (HCC): Primary | ICD-10-CM

## 2023-09-06 DIAGNOSIS — C78.7 METASTATIC COLON CANCER TO LIVER (HCC): Primary | ICD-10-CM

## 2023-09-06 NOTE — PROGRESS NOTES
Called pt to check in and see how shes doing since starting treatment. She stated she is doing well so far. She is eating and drinking with no issues. We confirmed her upcoming appointments, and I sent an e mail to [employer] confirming she is set up for transportation for her medical oncology appt 9/8.  She was thankful for the call, and has my contact information if she needs anything     Received a confirmation e mail from [employer] she is set up for 9/8/2023

## 2023-09-08 ENCOUNTER — HOSPITAL ENCOUNTER (OUTPATIENT)
Dept: INFUSION CENTER | Facility: HOSPITAL | Age: 68
End: 2023-09-08
Payer: MEDICARE

## 2023-09-08 ENCOUNTER — TELEPHONE (OUTPATIENT)
Dept: HEMATOLOGY ONCOLOGY | Facility: CLINIC | Age: 68
End: 2023-09-08

## 2023-09-08 ENCOUNTER — OFFICE VISIT (OUTPATIENT)
Dept: HEMATOLOGY ONCOLOGY | Facility: CLINIC | Age: 68
End: 2023-09-08
Payer: MEDICARE

## 2023-09-08 VITALS
BODY MASS INDEX: 41.56 KG/M2 | HEIGHT: 66 IN | HEART RATE: 97 BPM | TEMPERATURE: 97.6 F | OXYGEN SATURATION: 99 % | RESPIRATION RATE: 17 BRPM | SYSTOLIC BLOOD PRESSURE: 146 MMHG | WEIGHT: 258.6 LBS | DIASTOLIC BLOOD PRESSURE: 92 MMHG

## 2023-09-08 DIAGNOSIS — E87.6 HYPOKALEMIA: Primary | ICD-10-CM

## 2023-09-08 DIAGNOSIS — C09.9 RIGHT TONSILLAR SQUAMOUS CELL CARCINOMA (HCC): ICD-10-CM

## 2023-09-08 DIAGNOSIS — C18.9 METASTATIC COLON CANCER TO LIVER (HCC): Primary | ICD-10-CM

## 2023-09-08 DIAGNOSIS — C78.7 METASTATIC COLON CANCER TO LIVER (HCC): Primary | ICD-10-CM

## 2023-09-08 DIAGNOSIS — C78.7 METASTATIC COLON CANCER TO LIVER (HCC): ICD-10-CM

## 2023-09-08 DIAGNOSIS — C18.9 METASTATIC COLON CANCER TO LIVER (HCC): ICD-10-CM

## 2023-09-08 LAB
ALBUMIN SERPL BCP-MCNC: 3.4 G/DL (ref 3.5–5)
ALP SERPL-CCNC: 118 U/L (ref 34–104)
ALT SERPL W P-5'-P-CCNC: 47 U/L (ref 7–52)
ANION GAP SERPL CALCULATED.3IONS-SCNC: 7 MMOL/L
AST SERPL W P-5'-P-CCNC: 52 U/L (ref 13–39)
BASOPHILS # BLD AUTO: 0.02 THOUSANDS/ÂΜL (ref 0–0.1)
BASOPHILS NFR BLD AUTO: 0 % (ref 0–1)
BILIRUB SERPL-MCNC: 0.38 MG/DL (ref 0.2–1)
BUN SERPL-MCNC: 10 MG/DL (ref 5–25)
CALCIUM ALBUM COR SERPL-MCNC: 9 MG/DL (ref 8.3–10.1)
CALCIUM SERPL-MCNC: 8.5 MG/DL (ref 8.4–10.2)
CHLORIDE SERPL-SCNC: 108 MMOL/L (ref 96–108)
CO2 SERPL-SCNC: 26 MMOL/L (ref 21–32)
CREAT SERPL-MCNC: 0.64 MG/DL (ref 0.6–1.3)
EOSINOPHIL # BLD AUTO: 0.18 THOUSAND/ÂΜL (ref 0–0.61)
EOSINOPHIL NFR BLD AUTO: 4 % (ref 0–6)
ERYTHROCYTE [DISTWIDTH] IN BLOOD BY AUTOMATED COUNT: 16.2 % (ref 11.6–15.1)
GFR SERPL CREATININE-BSD FRML MDRD: 92 ML/MIN/1.73SQ M
GLUCOSE SERPL-MCNC: 112 MG/DL (ref 65–140)
HCT VFR BLD AUTO: 27.4 % (ref 34.8–46.1)
HGB BLD-MCNC: 8.9 G/DL (ref 11.5–15.4)
IMM GRANULOCYTES # BLD AUTO: 0.01 THOUSAND/UL (ref 0–0.2)
IMM GRANULOCYTES NFR BLD AUTO: 0 % (ref 0–2)
LYMPHOCYTES # BLD AUTO: 1.65 THOUSANDS/ÂΜL (ref 0.6–4.47)
LYMPHOCYTES NFR BLD AUTO: 35 % (ref 14–44)
MCH RBC QN AUTO: 25.8 PG (ref 26.8–34.3)
MCHC RBC AUTO-ENTMCNC: 32.5 G/DL (ref 31.4–37.4)
MCV RBC AUTO: 79 FL (ref 82–98)
MONOCYTES # BLD AUTO: 0.4 THOUSAND/ÂΜL (ref 0.17–1.22)
MONOCYTES NFR BLD AUTO: 8 % (ref 4–12)
NEUTROPHILS # BLD AUTO: 2.51 THOUSANDS/ÂΜL (ref 1.85–7.62)
NEUTS SEG NFR BLD AUTO: 53 % (ref 43–75)
NRBC BLD AUTO-RTO: 0 /100 WBCS
PLATELET # BLD AUTO: 168 THOUSANDS/UL (ref 149–390)
PMV BLD AUTO: 10 FL (ref 8.9–12.7)
POTASSIUM SERPL-SCNC: 3.1 MMOL/L (ref 3.5–5.3)
PROT SERPL-MCNC: 6.9 G/DL (ref 6.4–8.4)
RBC # BLD AUTO: 3.45 MILLION/UL (ref 3.81–5.12)
SODIUM SERPL-SCNC: 141 MMOL/L (ref 135–147)
WBC # BLD AUTO: 4.77 THOUSAND/UL (ref 4.31–10.16)

## 2023-09-08 PROCEDURE — 85025 COMPLETE CBC W/AUTO DIFF WBC: CPT | Performed by: INTERNAL MEDICINE

## 2023-09-08 PROCEDURE — 99213 OFFICE O/P EST LOW 20 MIN: CPT

## 2023-09-08 PROCEDURE — 36593 DECLOT VASCULAR DEVICE: CPT

## 2023-09-08 PROCEDURE — 80053 COMPREHEN METABOLIC PANEL: CPT | Performed by: INTERNAL MEDICINE

## 2023-09-08 RX ORDER — POTASSIUM CHLORIDE 20 MEQ/1
20 TABLET, EXTENDED RELEASE ORAL 2 TIMES DAILY
Qty: 14 TABLET | Refills: 0 | Status: SHIPPED | OUTPATIENT
Start: 2023-09-08 | End: 2023-09-15

## 2023-09-08 RX ADMIN — ALTEPLASE 2 MG: 2.2 INJECTION, POWDER, LYOPHILIZED, FOR SOLUTION INTRAVENOUS at 12:21

## 2023-09-08 NOTE — PROGRESS NOTES
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   - grade 2  - %  - MT 95%  - HER2 negative     2018 Surgery    Right partial mastectomy and SLN biopsy:  Infiltrating ductal carcinoma, Grade 2/3, felt to be between 2.0 cm and 2.5 cm in greatest dimension  - No invasive tumor is seen at inked margins, but there is a focus of DCIS seen within approximately 1mm of the inked medial margin  - no LVI  - no LCIS  - 0/2 LN's  at least Stage IIA - pT2, pN0, cM0, G2.    - Dr Kirk Alcantar     2018 -  Cancer Staged    Stage IIA - pT2, pN0, cM0, G2.       2019 Genomic Testing    Oncotype DX score: 13     2019 -  Hormone Therapy    anastrozole 1 mg daily as adjuvant endocrine therapy    - Dr Milly Harris       2019 - 4/3/2019 Radiation    Course: C1    Plan ID Energy Fractions Dose per Fraction (cGy) Dose Correction (cGy) Total Dose Delivered (cGy) Elapsed Days   R Breast 10X/6X 25 / 25 200 0 5,000 40   R BRST BOOST 16E 5 / 5 200 0 1,000 7      Treatment dates:  C1: 2019 - 4/3/2019       Metastatic colon cancer to liver ([address])   2023 Genetic Testing    CARIS Results:   • KRAS Pathogenic Variant Exon 2  p.G12D : lack of benefit of cetuximab, panitumumab Level 2   • No other actionable mutation; MSI stable; TMB low   • MiFOLOXAi Results: "Decreased Benefit of FOLFOX + Bevacizumab in first line metastatic CRC. This patient may achieve improved results by receiving an alternative to FOLFOX, such as FOLFIRI, as their initial regimen. As an adjustment to frontline FOLFOXIRI following toxicity: This patient may achieve improved results by removing the oxaliplatin portion of their regimen".          7/11/2023 Initial Diagnosis    Metastatic colon cancer to liver ([address])     7/13/2023 -  Cancer Staged    Staging form: Colon and Rectum, AJCC 8th Edition  - Clinical stage from 7/13/2023: Stage Unknown (cTX, cNX, pM1) - Signed by Valencia Baig DO on 7/13/2023 7/31/2023 -  Chemotherapy    alteplase (CATHFLO), 2 mg, Intracatheter, Every 1 Minute as needed, 3 of 12 cycles  fluorouracil (ADRUCIL), 895 mg, Intravenous, Once, 3 of 12 cycles  Administration: 900 mg (7/31/2023), 900 mg (8/14/2023), 900 mg (8/28/2023)  leucovorin calcium IVPB, 896 mg, Intravenous, Once, 3 of 12 cycles  Administration: 900 mg (7/31/2023), 900 mg (8/14/2023), 900 mg (8/28/2023)  oxaliplatin (ELOXATIN) chemo infusion, 85 mg/m2 = 190.4 mg, Intravenous, Once, 3 of 12 cycles  Administration: 190.4 mg (7/31/2023), 190.4 mg (8/14/2023), 200 mg (8/28/2023)  fluorouracil (ADRUCIL) ambulatory infusion Soln, 1,200 mg/m2/day = 5,375 mg, Intravenous, Over 46 hours, 3 of 12 cycles     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[address])   7/27/2023 Initial Diagnosis    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[address])     7/27/2023 -  Cancer Staged    Staging form: Pharynx - Oropharynx, AJCC 8th Edition  - Clinical stage from 7/27/2023: Stage III (cT1, cN3, cM0, p16+) - Signed by Hilaria Benitez MD on 7/27/2023  Stage prefix: Initial diagnosis           Interval History:  Patient presents for a follow up of her metastatic colon and  cancer and right tonsillar cancer prior to cycle 4 of FOLFOX. She tolerated cycle 3 fairly well. Reports she had fatigue for two days after treatment and starts to resolve by day 3. She had one day of diarrhea, not requiring antidiarrheals. She has nausea the first couple of days after treatment, antiemetics offering relief. She has intermittent neuropathy, has not worsened. Patient reports her appetite was improved the past week, she is also trying to drink ensure in between meals. No fevers or infections. No CP or palpitations. Patient will obtain labs after office visit. REVIEW OF SYSTEMS:  Please note that a 14-point review of systems was performed to include Constitutional, HEENT, Respiratory, CVS, GI, , Musculoskeletal, Integumentary, Neurologic, Rheumatologic, Endocrinologic, Psychiatric, Lymphatic, and Hematologic/Oncologic systems were reviewed and are negative unless otherwise stated in HPI. Positive and negative findings pertinent to this evaluation are incorporated into the history of present illness. ECOG PS: 0     PROBLEM LIST:  Patient Active Problem List   Diagnosis   • Primary hypertension   • Vasomotor rhinitis   • Mixed hyperlipidemia   • Screen for colon cancer   • Malignant neoplasm of right female breast ([address])   • Vitamin D deficiency   • Encounter for screening for HIV   • Prediabetes   • Cervical lymphadenopathy   • Follow-up examination   • Right thyroid nodule   • GERD (gastroesophageal reflux disease)   • Stroke [facility])   • Obesity   • Metastatic colon cancer to liver [facility])   • 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HCC)   • Poor appetite   • Anemia       Assessment / Plan: We will proceed with cycle 4 of FOLFOX at current dose pending labs. Encouraged patient to continue with ensure shakes in between her meals. Provided patient with samples of Ensure. Patient aware to contact us for worsening symptoms or for additional questions/concerns.       I spent 20 minutes on chart review, face to face counseling time, coordination of care and documentation. Past Medical History:   has a past medical history of Anemia, Arthritis, Breast cancer ([address]) (12/17/2018), Cancer ([address]), GERD (gastroesophageal reflux disease), Hyperlipidemia, Hypertension, Obesity, Stroke ([address]), and Transaminitis (09/22/2021). PAST SURGICAL HISTORY:   has a past surgical history that includes US guided breast biopsy right complete (Right, 11/23/2018); Breast surgery; pr mastectomy partial w/axillary lymphadenectomy (Right, 12/20/2018); Tubal ligation; Breast biopsy (Right, 11/23/2018); US guided injection for research study (12/20/2018); US guided injection for research study (12/7/2018); US guided injection for research study (5/3/2019); US guided lymph node biopsy right (5/26/2023); IR biopsy liver mass (6/27/2023); pr laryngoscopy w/biopsy microscope/telescope (N/A, 7/20/2023); pr brncc incl fluor gdnce dx w/cell washg spx (N/A, 7/20/2023); ESOPHAGOSCOPY (N/A, 7/20/2023); and IR port placement (7/28/2023).     CURRENT MEDICATIONS  Current Outpatient Medications   Medication Sig Dispense Refill   • amoxicillin-clavulanate (AUGMENTIN) 875-125 mg per tablet Take 1 tablet by mouth every 12 (twelve) hours for 7 days 14 tablet 0   • anastrozole (ARIMIDEX) 1 mg tablet Take 1 tablet (1 mg total) by mouth daily 90 tablet 3   • atorvastatin (LIPITOR) 40 mg tablet Take 1 tablet (40 mg total) by mouth daily at bedtime 30 tablet 2   • Cyanocobalamin (B-12 PO) Take by mouth     • [START ON 9/11/2023] fluorouracil 5,375 mg in CADD/Elastomeric Infusion Device Infuse 5,375 mg (1,200 mg/m2/day x 2.24 m2) into a catheter in a vein via infusion device over 46 hours for 2 days  Infusion planned for September 11, 2023. 1 each 0   • FOLIC ACID PO Take by mouth     • lidocaine-prilocaine (EMLA) cream Apply topically as needed for mild pain 30 g 3   • losartan (COZAAR) 50 mg tablet Take 1 tablet (50 mg total) by mouth daily 90 tablet 1 • omeprazole (PriLOSEC) 20 mg delayed release capsule Take 1 capsule (20 mg total) by mouth daily 30 capsule 5   • ondansetron (ZOFRAN) 8 mg tablet Take 1 tablet (8 mg total) by mouth every 8 (eight) hours as needed for nausea or vomiting 30 tablet 3   • VITAMIN D PO Take by mouth     • polyethylene glycol (GOLYTELY) 4000 mL solution Take 4,000 mL by mouth once for 1 dose 4000 mL 0   • potassium chloride (K-DUR,KLOR-CON) 20 mEq tablet Take 1 tablet (20 mEq total) by mouth 2 (two) times a day for 7 days 14 tablet 0   • prochlorperazine (COMPAZINE) 10 mg tablet Take 1 tablet (10 mg total) by mouth every 6 (six) hours as needed for nausea or vomiting (Patient not taking: Reported on 8/3/2023) 30 tablet 3     No current facility-administered medications for this visit. [unfilled]    SOCIAL HISTORY:   reports that she has never smoked. She has never used smokeless tobacco. She reports that she does not drink alcohol and does not use drugs. FAMILY HISTORY:  family history includes Colon cancer (age of onset: 62) in her mother; Hypertension in her daughter, mother, and son; Pancreatic cancer (age of onset: 61) in her father. ALLERGIES:  has No Known Allergies. Physical Exam:  Vital Signs:   Visit Vitals  /92 (BP Location: Left arm, Patient Position: Sitting, Cuff Size: Adult)   Pulse 97   Temp 97.6 °F (36.4 °C)   Resp 17   Ht 5' 5.98" (1.676 m)   Wt 117 kg (258 lb 9.6 oz)   SpO2 99%   BMI 41.76 kg/m²   OB Status Postmenopausal   Smoking Status Never   BSA 2.23 m²     Body mass index is 41.76 kg/m². Body surface area is 2.23 meters squared. GEN: Alert, awake oriented x3, in no acute distress  HEENT- No pallor, icterus, cyanosis, no oral mucosal lesions,   LAD - no palpable cervical, clavicle, axillary, inguinal LAD  Heart- normal S1 S2, regular rate and rhythm, No murmur, rubs.    Lungs- clear breathing sound bilateral.   Abdomen- soft, Non tender, bowel sounds present  Extremities- No cyanosis, clubbing, edema  Neuro- No focal neurological deficit    Labs:  Lab Results   Component Value Date    WBC 5.00 08/25/2023    HGB 8.9 (L) 08/25/2023    HCT 28.1 (L) 08/25/2023    MCV 80 (L) 08/25/2023     08/25/2023     Lab Results   Component Value Date    SODIUM 136 08/25/2023    K 3.6 08/25/2023     08/25/2023    CO2 24 08/25/2023    AGAP 8 08/25/2023    BUN 16 08/25/2023    CREATININE 0.95 08/25/2023    GLUC 113 08/25/2023    GLUF 110 (H) 08/19/2023    CALCIUM 9.0 08/25/2023    AST 72 (H) 08/25/2023    ALT 70 (H) 08/25/2023    ALKPHOS 104 08/25/2023    TP 7.3 08/25/2023    TBILI 0.40 08/25/2023    EGFR 62 08/25/2023

## 2023-09-08 NOTE — TELEPHONE ENCOUNTER
Left detailed VM for patient stating a potassium prescription has been sent to her pharmacy due to low potassium level. Provided direct call back number if patient has any questions or concerns.

## 2023-09-08 NOTE — PROGRESS NOTES
Port accessed, central labs drawn, de-accessed without incident. Patient declined printed AVS.   Patient to return to department as scheduled.

## 2023-09-11 ENCOUNTER — HOSPITAL ENCOUNTER (OUTPATIENT)
Dept: INFUSION CENTER | Facility: CLINIC | Age: 68
Discharge: HOME | End: 2023-09-11
Payer: MEDICARE

## 2023-09-11 ENCOUNTER — TELEPHONE (OUTPATIENT)
Age: 68
End: 2023-09-11

## 2023-09-11 ENCOUNTER — NUTRITION (OUTPATIENT)
Dept: NUTRITION | Facility: CLINIC | Age: 68
End: 2023-09-11

## 2023-09-11 VITALS
TEMPERATURE: 97.9 F | RESPIRATION RATE: 18 BRPM | BODY MASS INDEX: 42.02 KG/M2 | DIASTOLIC BLOOD PRESSURE: 81 MMHG | WEIGHT: 261.47 LBS | HEIGHT: 66 IN | HEART RATE: 78 BPM | SYSTOLIC BLOOD PRESSURE: 128 MMHG

## 2023-09-11 DIAGNOSIS — E87.6 HYPOKALEMIA: ICD-10-CM

## 2023-09-11 DIAGNOSIS — C78.7 METASTATIC COLON CANCER TO LIVER (HCC): Primary | ICD-10-CM

## 2023-09-11 DIAGNOSIS — C18.9 METASTATIC COLON CANCER TO LIVER (HCC): Primary | ICD-10-CM

## 2023-09-11 DIAGNOSIS — Z71.3 NUTRITIONAL COUNSELING: Primary | ICD-10-CM

## 2023-09-11 DIAGNOSIS — R79.0 LOW MAGNESIUM LEVEL: Primary | ICD-10-CM

## 2023-09-11 LAB — MAGNESIUM SERPL-MCNC: 1.8 MG/DL (ref 1.9–2.7)

## 2023-09-11 PROCEDURE — 96368 THER/DIAG CONCURRENT INF: CPT

## 2023-09-11 PROCEDURE — 96411 CHEMO IV PUSH ADDL DRUG: CPT

## 2023-09-11 PROCEDURE — 83735 ASSAY OF MAGNESIUM: CPT

## 2023-09-11 PROCEDURE — 96415 CHEMO IV INFUSION ADDL HR: CPT

## 2023-09-11 PROCEDURE — G0498 CHEMO EXTEND IV INFUS W/PUMP: HCPCS

## 2023-09-11 PROCEDURE — 96413 CHEMO IV INFUSION 1 HR: CPT

## 2023-09-11 PROCEDURE — 96367 TX/PROPH/DG ADDL SEQ IV INF: CPT

## 2023-09-11 RX ORDER — SODIUM CHLORIDE 9 MG/ML
20 INJECTION, SOLUTION INTRAVENOUS ONCE AS NEEDED
Status: DISCONTINUED | OUTPATIENT
Start: 2023-09-11 | End: 2023-09-14 | Stop reason: HOSPADM

## 2023-09-11 RX ORDER — FLUOROURACIL 50 MG/ML
900 INJECTION, SOLUTION INTRAVENOUS ONCE
Status: COMPLETED | OUTPATIENT
Start: 2023-09-11 | End: 2023-09-11

## 2023-09-11 RX ORDER — DEXTROSE MONOHYDRATE 50 MG/ML
20 INJECTION, SOLUTION INTRAVENOUS ONCE
Status: COMPLETED | OUTPATIENT
Start: 2023-09-11 | End: 2023-09-11

## 2023-09-11 RX ORDER — CALCIUM CARBONATE/VITAMIN D3 500-10/5ML
400 LIQUID (ML) ORAL 2 TIMES DAILY
Qty: 14 CAPSULE | Refills: 0 | Status: SHIPPED | OUTPATIENT
Start: 2023-09-11

## 2023-09-11 RX ADMIN — DEXAMETHASONE SODIUM PHOSPHATE: 10 INJECTION, SOLUTION INTRAMUSCULAR; INTRAVENOUS at 08:50

## 2023-09-11 RX ADMIN — DEXTROSE 20 ML/HR: 5 SOLUTION INTRAVENOUS at 08:50

## 2023-09-11 RX ADMIN — OXALIPLATIN 200 MG: 5 INJECTION, SOLUTION INTRAVENOUS at 09:21

## 2023-09-11 RX ADMIN — LEUCOVORIN CALCIUM 900 MG: 500 INJECTION, POWDER, LYOPHILIZED, FOR SOLUTION INTRAMUSCULAR; INTRAVENOUS at 09:19

## 2023-09-11 RX ADMIN — FLUOROURACIL 900 MG: 50 INJECTION, SOLUTION INTRAVENOUS at 11:19

## 2023-09-11 NOTE — PATIENT INSTRUCTIONS
Nutrition Rx & Recommendations:  Diet: High Calorie, High Protein (for high calorie foods see pages 52-53, and for high protein foods see pages 49-51 in your Eating Hints book)  Nutrition Supplements: Utilize as needed. Encouraged Ensure Plus or Complete. May use homemade shakes/smoothies as desired. If using a pre-made shake/bar, choose ones with >300 calories and >10 grams protein (ex. Ensure Complete, Ensure Enlive, Ensure Plus, Boost Plus, Boost Very High Calorie, etc.). Small, frequent meals/snacks may be easier to tolerate than 3 large daily meals. Aim for 5-6 small meals per day. Include protein at all meals/snacks. Include a variety of foods (as tolerated/allowed by diet). Stay hydrated by sipping fluids of choice/tolerance throughout the day. Liquid nutrition may be better tolerated than solids at times. Alter food choices and eating patterns to accommodate changing needs. Refer to Eating Hints book for other meal/snack ideas and symptom management. Follow proper oral care; Try baking soda/salt water rinse recipe (mix 3/4 tsp salt + 1 tsp baking soda + 1 qt water; rinse with plain water after using) in Eating Hints book (pg 18). Brush your teeth before/after meals & before bed. For lack of taste: Practice good oral care. Blend fresh fruits into shakes, ice cream or yogurt. Eat frozen fruits: grapes, chopped cantaloupe, watermelon. Select fresh vegetables (may be more appealing than canned/frozen). Add extra flavor to foods: experiment with spices, salt & sweeteners, extra oil/butter; marinate meats (see pages 29-30 in your Eating Hints book).   For appetite loss: try powdered or liquid nutrition supplements; eating by the clock every 2-3 hours; set a timer to remind yourself to eat, keep snacks nearby; add extra protein & calories to your diet; drink liquids in between meals, choose liquids that add calories; eat a bedtime snack; eat soft, cool or frozen foods; eat a larger meal when you feel well & rested; only sip small amounts of liquids during meals (see pages 10-12 in your Eating Hints book). For weight loss: monitor your weight at home at least 2x/week, record your weight, start by adding 250-500 extra calories per day, eat 5-6 small scheduled meals every 2-3 hrs, choose foods that are high in protein and calories (see pages 49-53 in your Eating Hints book), drink liquids with calories for example: milkshakes/smoothies/juice/soup/whole milk/chocolate milk, cook with protein fortified milk (see recipe on page 36 in your Eating Hints book), consider ready-to-drink oral nutrition supplements such as Ensure Plus, Ensure Enlive, Boost Plus, or Boost Very High Calorie, avoid "diet" and "light" foods when possible, avoid drinking too much with meals, contact your dietitian with any continued weight loss over the course of 1 week. For more info see pages 35-37 in your Eating Hints book. For dry mouth: sip water throughout the day; try very sweet drinks; chew gum or suck on hard candy, popsicles, & ice chips; eat easy to swallow foods (such as pureed cooked foods or soups); moisten food with sauce/gravy/salad dressing; do not drink beer, wine, or any type of alcohol; keep lips moist with lip balm; avoid tobacco products & second-hand smoke; try Biotene as needed (see pages 17-18 in your Eating Hints book). Follow proper oral care; Try baking soda/salt water rinse recipe (mix 3/4 tsp salt + 1 tsp baking soda + 1 qt water; rinse with pain water after using) in Eating Hints book (pg 18). Brush your teeth before/after meals & before bed. Weigh yourself regularly. If you notice weight loss, make an effort to increase your daily food/calorie intake. If you continue to notice loss after these efforts, reach out to your dietitian to establish a plan to stabilize weight. [place] with the [employer] Concept - add extra Fat, Acidity (as long as you do not have mouth or throat pain), Salt, and Sweetness to foods to help improve flavor.   Try using greek yogurt instead of regular  Snack ideas:  [place] yogurt  Cottage cheese and fruit  Nuts or nut butter  Soup or bone broth  Egg chicken or tuna salad   Oral nutrition supplement    Follow Up Plan: 10/9/23 during infusion  Recommend Referral to Other Providers: none at this time

## 2023-09-11 NOTE — PROGRESS NOTES
Outpatient Oncology Nutrition Consultation   Type of Consult: Follow Up  Care Location: [facility] [place]    Reason for referral: Received notification by Vinay RN, Nazario Aguilera., on 8/1/23 that pt has triggered for oncology nutrition care (reason for referral: HNC dx and Malnutrition Screening Tool (MST) Triggers: scored a 0 indicating No recent wt loss and is not eating poorly due to a decreased appetite. (Date of MST: 8/1/23)). 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   - grade 2  - %  - LA 95%  - HER2 negative     12/20/2018 Surgery    Right partial mastectomy and SLN biopsy:  Infiltrating ductal carcinoma, Grade 2/3, felt to be between 2.0 cm and 2.5 cm in greatest dimension  - No invasive tumor is seen at inked margins, but there is a focus of DCIS seen within approximately 1mm of the inked medial margin  - no LVI  - no LCIS  - 0/2 LN's  at least Stage IIA - pT2, pN0, cM0, G2.    - Dr Isaías Dumont     12/20/2018 -  Cancer Staged    Stage IIA - pT2, pN0, cM0, G2.       1/4/2019 Genomic Testing    Oncotype DX score: 13 2/1/2019 -  Hormone Therapy    anastrozole 1 mg daily as adjuvant endocrine therapy    - Dr Itzel Dumont       2/14/2019 - 4/3/2019 Radiation    Course: C1    Plan ID Energy Fractions Dose per Fraction (cGy) Dose Correction (cGy) Total Dose Delivered (cGy) Elapsed Days   R Breast 10X/6X 25 / 25 200 0 5,000 40   R BRST BOOST 16E 5 / 5 200 0 1,000 7      Treatment dates:  C1: 2/14/2019 - 4/3/2019       Metastatic colon cancer to liver (HCC)   7/6/2023 Genetic Testing    CARIS Results:   • KRAS Pathogenic Variant Exon 2  p.G12D : lack of benefit of cetuximab, panitumumab Level 2   • No other actionable mutation; MSI stable; TMB low   • MiFOLOXAi Results: "Decreased Benefit of FOLFOX + Bevacizumab in first line metastatic CRC. This patient may achieve improved results by receiving an alternative to FOLFOX, such as FOLFIRI, as their initial regimen. As an adjustment to frontline FOLFOXIRI following toxicity: This patient may achieve improved results by removing the oxaliplatin portion of their regimen".          7/11/2023 Initial Diagnosis    Metastatic colon cancer to liver ([address])     7/13/2023 -  Cancer Staged    Staging form: Colon and Rectum, AJCC 8th Edition  - Clinical stage from 7/13/2023: Stage Unknown (cTX, cNX, pM1) - Signed by Eric Ayers DO on 7/13/2023 7/31/2023 -  Chemotherapy    alteplase (CATHFLO), 2 mg, Intracatheter, Every 1 Minute as needed, 4 of 12 cycles  fluorouracil (ADRUCIL), 895 mg, Intravenous, Once, 4 of 12 cycles  Administration: 900 mg (7/31/2023), 900 mg (8/14/2023), 900 mg (8/28/2023)  leucovorin calcium IVPB, 896 mg, Intravenous, Once, 4 of 12 cycles  Administration: 900 mg (7/31/2023), 900 mg (8/14/2023), 900 mg (8/28/2023)  oxaliplatin (ELOXATIN) chemo infusion, 85 mg/m2 = 190.4 mg, Intravenous, Once, 4 of 12 cycles  Administration: 190.4 mg (7/31/2023), 190.4 mg (8/14/2023), 200 mg (8/28/2023)  fluorouracil (ADRUCIL) ambulatory infusion Soln, 1,200 mg/m2/day = 5,375 mg, Intravenous, Over 46 hours, 4 of 12 cycles     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[address])   7/27/2023 Initial Diagnosis    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[address])     7/27/2023 -  Cancer Staged    Staging form: Pharynx - Oropharynx, AJCC 8th Edition  - Clinical stage from 7/27/2023: Stage III (cT1, cN3, cM0, p16+) - Signed by Agnes Ott MD on 7/27/2023  Stage prefix: Initial diagnosis         Past Medical & Surgical Hx:   Patient Active Problem List   Diagnosis   • Primary hypertension   • Vasomotor rhinitis   • Mixed hyperlipidemia   • Screen for colon cancer   • Malignant neoplasm of right female breast ([address])   • Vitamin D deficiency   • Encounter for screening for HIV   • Prediabetes   • Cervical lymphadenopathy   • Follow-up examination   • Right thyroid nodule   • GERD (gastroesophageal reflux disease)   • Stroke [facility])   • Obesity   • Metastatic colon cancer to liver [facility])   • 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HCC)   • Poor appetite   • Anemia     Past Medical History:   Diagnosis Date   • Anemia    • Arthritis    • Breast cancer ([address]) 12/17/2018   • Cancer ([address])     right breast, colon, liver, right tonsil   • GERD (gastroesophageal reflux disease)    • Hyperlipidemia    • Hypertension    • Obesity    • Stroke [facility])     TIA    • Transaminitis 09/22/2021     Past Surgical History:   Procedure Laterality Date   • BREAST BIOPSY Right 11/23/2018    us guided bx cancer   • BREAST SURGERY     • ESOPHAGOSCOPY N/A 7/20/2023    Procedure: ESOPHAGOSCOPY;  Surgeon: Jenna Gomez MD;  [facility];  Service: ENT   • IR BIOPSY LIVER MASS  6/27/2023   • IR PORT PLACEMENT  7/28/2023   •  [address] INCL FLUOR GDNCE DX W/CELL WASHG [facility] ER N/A 7/20/2023    Procedure: BRONCHOSCOPY;  Surgeon: Jenna Gomez MD;  [facility];  Service: ENT   • MD LARYNGOSCOPY W/BIOPSY MICROSCOPE/TELESCOPE N/A 7/20/2023    Procedure: MICRODIRECT LARYNGOSCOPY WITH BIOPSY;  Surgeon: Jenna Gomez MD; [facility];  Service: ENT   • WA MASTECTOMY PARTIAL W/AXILLARY LYMPHADENECTOMY Right 12/20/2018    Procedure: ULTRASOUND LOCALIZED PARTIAL MASTECTOMY W/SENTINEL NODE BIOPSY POSS AXILLARY DISSECTION;  Surgeon: Valentino Mays, MD;  Location: [facility] MAIN OR;  Service: General   • TUBAL LIGATION     • US GUIDED BREAST BIOPSY RIGHT COMPLETE Right 11/23/2018   • US GUIDED INJECTION FOR RESEARCH STUDY  12/20/2018   • US GUIDED INJECTION FOR RESEARCH STUDY  12/7/2018   • US GUIDED INJECTION FOR RESEARCH STUDY  5/3/2019   • US GUIDED LYMPH NODE BIOPSY RIGHT  5/26/2023       Review of Medications:   Vitamins, Supplements and Herbals: No, pt denies taking supplements    Current Outpatient Medications:   •  anastrozole (ARIMIDEX) 1 mg tablet, Take 1 tablet (1 mg total) by mouth daily, Disp: 90 tablet, Rfl: 3  •  atorvastatin (LIPITOR) 40 mg tablet, Take 1 tablet (40 mg total) by mouth daily at bedtime, Disp: 30 tablet, Rfl: 2  •  Cyanocobalamin (B-12 PO), Take by mouth, Disp: , Rfl:   •  fluorouracil 5,375 mg in CADD/Elastomeric Infusion Device, Infuse 5,375 mg (1,200 mg/m2/day x 2.24 m2) into a catheter in a vein via infusion device over 46 hours for 2 days  Infusion planned for September 11, 2023., Disp: 1 each, Rfl: 0  •  FOLIC ACID PO, Take by mouth, Disp: , Rfl:   •  lidocaine-prilocaine (EMLA) cream, Apply topically as needed for mild pain, Disp: 30 g, Rfl: 3  •  losartan (COZAAR) 50 mg tablet, Take 1 tablet (50 mg total) by mouth daily, Disp: 90 tablet, Rfl: 1  •  omeprazole (PriLOSEC) 20 mg delayed release capsule, Take 1 capsule (20 mg total) by mouth daily, Disp: 30 capsule, Rfl: 5  •  ondansetron (ZOFRAN) 8 mg tablet, Take 1 tablet (8 mg total) by mouth every 8 (eight) hours as needed for nausea or vomiting, Disp: 30 tablet, Rfl: 3  •  polyethylene glycol (GOLYTELY) 4000 mL solution, Take 4,000 mL by mouth once for 1 dose, Disp: 4000 mL, Rfl: 0  •  potassium chloride (K-DUR,KLOR-CON) 20 mEq tablet, Take 1 tablet (20 mEq total) by mouth 2 (two) times a day for 7 days, Disp: 14 tablet, Rfl: 0  •  prochlorperazine (COMPAZINE) 10 mg tablet, Take 1 tablet (10 mg total) by mouth every 6 (six) hours as needed for nausea or vomiting (Patient not taking: Reported on 8/3/2023), Disp: 30 tablet, Rfl: 3  •  VITAMIN D PO, Take by mouth, Disp: , Rfl:   No current facility-administered medications for this visit.     Facility-Administered Medications Ordered in Other Visits:   •  alteplase (CATHFLO) injection 2 mg, 2 mg, Intracatheter, Q1MIN PRN, Miroslava Dinh, DO  •  fluorouracil (ADRUCIL) injection 900 mg, 900 mg, Intravenous, Once, Miroslava Dinh DO  •  leucovorin 900 mg in dextrose 5 % 250 mL IVPB, 900 mg, Intravenous, Once, Miroslava Dinh DO, Last Rate: 170 mL/hr at 09/11/23 0919, 900 mg at 09/11/23 0919  •  oxaliplatin (ELOXATIN) 200 mg in dextrose 5 % 250 mL chemo infusion, 200 mg, Intravenous, Once, Miroslava Dinh DO, Last Rate: 145 mL/hr at 09/11/23 0921, 200 mg at 09/11/23 5647  •  sodium chloride 0.9 % infusion, 20 mL/hr, Intravenous, Once PRN, Miroslava Dinh DO    Most Recent Lab Results:   Lab Results   Component Value Date    WBC 4.77 09/08/2023    NEUTROABS 2.51 09/08/2023    IRON 28 (L) 09/05/2023    TIBC 319 09/05/2023    FERRITIN 102 09/05/2023    CHOLESTEROL 222 (H) 06/13/2023    TRIG 140 06/13/2023    HDL 44 (L) 06/13/2023    LDLCALC 150 (H) 06/13/2023    ALT 47 09/08/2023    AST 52 (H) 09/08/2023    ALB 3.4 (L) 09/08/2023    SODIUM 141 09/08/2023    SODIUM 136 08/25/2023    K 3.1 (L) 09/08/2023    K 3.6 08/25/2023     09/08/2023    BUN 10 09/08/2023    BUN 16 08/25/2023    CREATININE 0.64 09/08/2023    CREATININE 0.95 08/25/2023    EGFR 92 09/08/2023    POCGLU 106 06/19/2023    GLUF 110 (H) 08/19/2023    GLUF 113 (H) 07/24/2023    GLUC 112 09/08/2023    HGBA1C 5.9 (H) 06/13/2023    HGBA1C 6.1 (H) 07/09/2022    HGBA1C 6.1 (H) 09/16/2021    CALCIUM 8.5 09/08/2023    FOLATE >20.0 (H) 05/23/2019 Anthropometric Measurements:   Height: 66"  Ht Readings from Last 1 Encounters:   09/11/23 5' 5.98" (1.676 m)     Wt Readings from Last 20 Encounters:   09/11/23 119 kg (261 lb 7.5 oz)   09/08/23 117 kg (258 lb 9.6 oz)   08/28/23 118 kg (261 lb 0.4 oz)   08/23/23 115 kg (254 lb)   08/22/23 116 kg (255 lb)   08/14/23 119 kg (262 lb 5.6 oz)   08/11/23 119 kg (262 lb)   08/03/23 118 kg (260 lb 12.8 oz)   08/01/23 121 kg (267 lb)   07/31/23 120 kg (264 lb 8.8 oz)   07/28/23 119 kg (263 lb)   07/11/23 119 kg (263 lb)   07/06/23 120 kg (264 lb)   07/05/23 120 kg (264 lb)   06/30/23 120 kg (264 lb 3.2 oz)   06/27/23 122 kg (268 lb)   06/07/23 122 kg (268 lb 9.6 oz)   04/26/23 125 kg (275 lb)   04/04/23 126 kg (278 lb 3.2 oz)   03/21/23 125 kg (275 lb)       Weight History:   • Usual Weight: 275#  • Varian: (2/22/19) 264.6#, (4/2/19) 263.4  • Home Scale: (somtime in july) 276#    Oncology Nutrition-Anthropometrics    Flowsheet Row Nutrition from 9/11/2023 in [facility] Nutrition from 8/17/2023 in [facility]   Patient age (years): 80 years 80 years   Patient (female) height (in): 77 in 77 in   Current Weight to be used for anthropometric calculations (lbs) 261.5 lbs 262.4 lbs   Current Weight to be used for anthropometric calculations (kg) 118.9 kg 119.3 kg   BMI: 42.2 42.3   IBW female: 130 lbs 130 lbs   IBW (kg) female: 59.1 kg 59.1 kg   IBW % (female) 201.2 % 201.8 %   Adjusted BW (female): 162.9 lbs 163.1 lbs   Adjusted BW kg (female): 74 kg 74.1 kg   % weight change after 1 month: -0.2 % -0.2 %   Weight change after 1 month (lbs) -0.5 lbs -0.6 lbs   % weight change after 3 months: -2.6 % --   Weight change after 3 months (lbs) -7 lbs --   % weight change after 6 months: -4.9 % --   Weight change after 6 months (lbs) -13.5 lbs --   % weight change after 1 year: -- -3.9 %          Nutrition-Focused Physical Findings: none observed    Food/Nutrition-Related History & Client/Social History:    Current Nutrition Impact Symptoms:  [] Nausea  [x] Reduced Appetite -fluctuates, improving [] Acid Reflux    [] Vomiting  [x] Unintended Wt Loss -stable now [] Malabsorption    [] Diarrhea  [] Unintended Wt Gain  [] Dumping Syndrome    [] Constipation  [] Thick Mucous/Secretions  [] Abdominal Pain    [x] Dysgeusia (Altered Taste) -starting to improve [x] Xerostomia (Dry Mouth)  [] Gas    [] Dysosmia (Altered Smell)  [] Thrush  [] Difficulty Chewing    [] Oral Mucositis (Sore Mouth)  [x] Fatigue  [] Hyperglycemia   Lab Results   Component Value Date    HGBA1C 5.9 (H) 2023      [] Odynophagia  [] Esophagitis  [] Other:    [] Dysphagia -swallow study 8/15/23- regular diet/thin liquids [] Early Satiety  [] No Problems Eating      Food Allergies & Intolerances: no    Current Diet: Regular Diet, No Restrictions  Current Nutrition Intake:  Increased since last visit  Appetite: Good, Fair , Fluctuating  Nutrition Route: PO  Oral Care: brushes daily  Activity level: ADLs, very fatigued so activity is limited     24 Hr Diet Recall:   Breakfast: grits w/ cheese  Snack: nothing  Lunch: meatball sandwich (ate about 1/2)  Snack: oreos  Dinner: pasta and meatballs  Snack: nothing    Beverages: water (16.9oz x1.5), iced tea (8oz x3-4), soda (12oz x1)  Supplements:   • Has Ensure Complete, Plus, Original at home- drinks occasionally when she doesn't feel like eating      Oncology Nutrition-Estimated Needs    Flowsheet Row Nutrition from 2023 in [address] On[facility]   Weight type used Adjusted weight   Weight in kilograms (kg) used for estimated needs 74.1 kg   Energy needs formula:  30-35 kcal/kg   Energy needs based on 30 kcal/k kcal   Energy needs based on 35 kcal/k kcal   Protein needs formula: 1.2-1.5 g/kg   Protein needs based on 1.2 g/k g   Protein needs based on 1.5 g/kg 111 g   Fluid needs formula: 30-35 mL/kg   Fluid needs based on 30 mL/kg 2223 mL   Fluid needs in ounces 75 oz   Fluid needs based on 35 mL/kg 2594 mL   Fluid needs in ounces 88 oz           Discussion & Intervention:   Kathy Clemente was evaluated today for an RD follow up regarding HNC. Kathy Clemente is currently undergoing tx for HNC. Met with Maira today during her infusion. She reports appetite is improving. She still experiences dysgeusia but does notice some improvement. She has been craving salty foods and states these foods taste better. She tried Ensure and drinks them when she doesn't feel like eating. She asked what her protein needs were so we discussed estimated needs. Encouraged her to have protein at all meals/snacks and discussed options. Tung encouraged her to increase her hydration. Reviewed 24 hour recall, which revealed an suboptimal po intake, and discussed ways to optimize nutrient intake, increase protein intake and increase fluid intake. Also reviewed the importance of wt management throughout the tx process and the role of a high kcal/ high protein diet in managing wt and overall health.   Based on today's assessment, discussion included: MNT for: dysgeusia, xerostomia, decreased appetite, hydration, how to modify foods for anticipated nutrition impact symptoms pt may experience during CA tx, practicing proper oral care, a high kcal/protein diet & food choices to include at all meals & snacks (Examples of high kcal foods: cheese, full-fat dairy products, nut butter, plant-based fats, coconut oil/milk, avocado, butter, cream soup, etc. Examples of high protein foods: eggs, chicken, fish, beans/legumes, nuts/nut butters, bone broth, etc.) , fortifying foods for added kcal and protein (examples include: adding cheese to foods such as eggs, mashed potatoes, casseroles, etc.; Making oatmeal with whole milk rather than water; Making fortified mashed potatoes with cream, butter, dry milk powder, plain [place] yogurt, and cheese.), adequate hydration & fluid choices, sipping on calorie containing beverages (examples include: adding whole milk or cream to coffee, oral nutrition supplements, juice, electrolyte replacement beverages, milk, etc.), eating smaller more frequent meals every 2-3 hours (5-6 small meals/day) and utilizing oral nutrition supplements. Moving forward, Gee Caro was encouraged to increase protein and fluid intake. Materials Provided: not applicable  All questions and concerns addressed during today’s visit. Gee Caro has RD contact information. Nutrition Diagnosis:   • Inadequate Energy Intake related to physiological causes, disease state and treatment related issues as evidenced by food recall, wt loss and discussion with pt and/or family. • Increased Nutrient Needs (kcal & pro) related to increased demand for nutrients and disease state as evidenced by cancer dx and pt undergoing tx for cancer. Monitoring & Evaluation:   Goals:  · weight maintenance/stabilization  · adequate nutrition impact symptom management  · pt to meet >/=75% estimated nutrition needs daily    · Progress Towards Goals: Progressing    Nutrition Rx & Recommendations:  · Diet: High Calorie, High Protein (for high calorie foods see pages 52-53, and for high protein foods see pages 49-51 in your Eating Hints book)  · Nutrition Supplements: Utilize as needed. Encouraged Ensure Plus or Complete. May use homemade shakes/smoothies as desired. If using a pre-made shake/bar, choose ones with >300 calories and >10 grams protein (ex. Ensure Complete, Ensure Enlive, Ensure Plus, Boost Plus, Boost Very High Calorie, etc.). · Small, frequent meals/snacks may be easier to tolerate than 3 large daily meals. Aim for 5-6 small meals per day. · Include protein at all meals/snacks. · Include a variety of foods (as tolerated/allowed by diet). · Stay hydrated by sipping fluids of choice/tolerance throughout the day.     · Liquid nutrition may be better tolerated than solids at times. · Alter food choices and eating patterns to accommodate changing needs. · Refer to Eating Hints book for other meal/snack ideas and symptom management. · Follow proper oral care; Try baking soda/salt water rinse recipe (mix 3/4 tsp salt + 1 tsp baking soda + 1 qt water; rinse with plain water after using) in Eating Hints book (pg 18). Brush your teeth before/after meals & before bed. · For lack of taste: Practice good oral care. Blend fresh fruits into shakes, ice cream or yogurt. Eat frozen fruits: grapes, chopped cantaloupe, watermelon. Select fresh vegetables (may be more appealing than canned/frozen). Add extra flavor to foods: experiment with spices, salt & sweeteners, extra oil/butter; marinate meats (see pages 29-30 in your Eating Hints book). · For appetite loss: try powdered or liquid nutrition supplements; eating by the clock every 2-3 hours; set a timer to remind yourself to eat, keep snacks nearby; add extra protein & calories to your diet; drink liquids in between meals, choose liquids that add calories; eat a bedtime snack; eat soft, cool or frozen foods; eat a larger meal when you feel well & rested; only sip small amounts of liquids during meals (see pages 10-12 in your Eating Hints book).   · For weight loss: monitor your weight at home at least 2x/week, record your weight, start by adding 250-500 extra calories per day, eat 5-6 small scheduled meals every 2-3 hrs, choose foods that are high in protein and calories (see pages 49-53 in your Eating Hints book), drink liquids with calories for example: milkshakes/smoothies/juice/soup/whole milk/chocolate milk, cook with protein fortified milk (see recipe on page 36 in your Eating Hints book), consider ready-to-drink oral nutrition supplements such as Ensure Plus, Ensure Enlive, Boost Plus, or Boost Very High Calorie, avoid "diet" and "light" foods when possible, avoid drinking too much with meals, contact your dietitian with any continued weight loss over the course of 1 week. For more info see pages 35-37 in your Eating Hints book. · For dry mouth: sip water throughout the day; try very sweet drinks; chew gum or suck on hard candy, popsicles, & ice chips; eat easy to swallow foods (such as pureed cooked foods or soups); moisten food with sauce/gravy/salad dressing; do not drink beer, wine, or any type of alcohol; keep lips moist with lip balm; avoid tobacco products & second-hand smoke; try Biotene as needed (see pages 17-18 in your Eating Hints book). Follow proper oral care; Try baking soda/salt water rinse recipe (mix 3/4 tsp salt + 1 tsp baking soda + 1 qt water; rinse with pain water after using) in Eating Hints book (pg 18). Brush your teeth before/after meals & before bed. · Weigh yourself regularly. If you notice weight loss, make an effort to increase your daily food/calorie intake. If you continue to notice loss after these efforts, reach out to your dietitian to establish a plan to stabilize weight. [place] with the [employer] Concept - add extra Fat, Acidity (as long as you do not have mouth or throat pain), Salt, and Sweetness to foods to help improve flavor.   · Try using greek yogurt instead of regular  · Snack ideas:  · [place] yogurt  · [address] and fruit  · Nuts or nut butter  · Soup or bone broth  · Egg chicken or tuna salad   · Oral nutrition supplement    Follow Up Plan: 10/9/23 during infusion  Recommend Referral to Other Providers: none at this time No bruits; no thyromegaly or nodules

## 2023-09-11 NOTE — PROGRESS NOTES
Pt presents for treatment today. Has been taking potassium as prescribed. Mag level drawn today per order. Pt tolerated treatment without incident. 5FU BRANDIE connected per protocol. Pt aware of disconnect date/time.  Pt provided with AVS.

## 2023-09-11 NOTE — TELEPHONE ENCOUNTER
Spoke with patient and let her know that her magnesium level is low so we sent in a magnesium supplement that she should like twice a day for a week. Patient verbalized understanding.

## 2023-09-13 ENCOUNTER — HOSPITAL ENCOUNTER (OUTPATIENT)
Dept: INFUSION CENTER | Facility: CLINIC | Age: 68
Discharge: HOME | End: 2023-09-13

## 2023-09-13 VITALS
HEART RATE: 52 BPM | RESPIRATION RATE: 18 BRPM | TEMPERATURE: 97 F | SYSTOLIC BLOOD PRESSURE: 137 MMHG | DIASTOLIC BLOOD PRESSURE: 80 MMHG

## 2023-09-13 DIAGNOSIS — C18.9 METASTATIC COLON CANCER TO LIVER (HCC): Primary | ICD-10-CM

## 2023-09-13 DIAGNOSIS — C78.7 METASTATIC COLON CANCER TO LIVER (HCC): Primary | ICD-10-CM

## 2023-09-13 NOTE — PROGRESS NOTES
Pt. Denied new symptoms or concerns today. 5FU Elastomeric pump completed over 46 hours as ordered. Port flushed with excellent blood return. Future appointments reviewed AVS declined.

## 2023-09-18 RX ORDER — DEXTROSE MONOHYDRATE 50 MG/ML
20 INJECTION, SOLUTION INTRAVENOUS ONCE
Status: CANCELLED | OUTPATIENT
Start: 2023-09-25

## 2023-09-18 RX ORDER — SODIUM CHLORIDE 9 MG/ML
20 INJECTION, SOLUTION INTRAVENOUS ONCE AS NEEDED
Status: CANCELLED | OUTPATIENT
Start: 2023-09-25

## 2023-09-18 RX ORDER — FLUOROURACIL 50 MG/ML
900 INJECTION, SOLUTION INTRAVENOUS ONCE
Status: CANCELLED | OUTPATIENT
Start: 2023-09-25

## 2023-09-19 ENCOUNTER — TELEPHONE (OUTPATIENT)
Dept: PALLIATIVE MEDICINE | Facility: CLINIC | Age: 68
End: 2023-09-19

## 2023-09-19 DIAGNOSIS — C18.9 METASTATIC COLON CANCER TO LIVER (HCC): Primary | ICD-10-CM

## 2023-09-19 DIAGNOSIS — C78.7 METASTATIC COLON CANCER TO LIVER (HCC): Primary | ICD-10-CM

## 2023-09-19 NOTE — TELEPHONE ENCOUNTER
Please send no show letter to patient. Left a message for patient to call office back to schedule an appointment with Palliative Care.

## 2023-09-22 ENCOUNTER — HOSPITAL ENCOUNTER (OUTPATIENT)
Dept: INFUSION CENTER | Facility: HOSPITAL | Age: 68
End: 2023-09-22
Payer: MEDICARE

## 2023-09-22 ENCOUNTER — TELEPHONE (OUTPATIENT)
Dept: HEMATOLOGY ONCOLOGY | Facility: CLINIC | Age: 68
End: 2023-09-22

## 2023-09-22 ENCOUNTER — OFFICE VISIT (OUTPATIENT)
Dept: HEMATOLOGY ONCOLOGY | Facility: CLINIC | Age: 68
End: 2023-09-22
Payer: MEDICARE

## 2023-09-22 ENCOUNTER — DOCUMENTATION (OUTPATIENT)
Dept: HEMATOLOGY ONCOLOGY | Facility: CLINIC | Age: 68
End: 2023-09-22

## 2023-09-22 VITALS
DIASTOLIC BLOOD PRESSURE: 86 MMHG | WEIGHT: 253.2 LBS | TEMPERATURE: 97.6 F | RESPIRATION RATE: 17 BRPM | HEART RATE: 99 BPM | SYSTOLIC BLOOD PRESSURE: 132 MMHG | HEIGHT: 66 IN | OXYGEN SATURATION: 98 % | BODY MASS INDEX: 40.69 KG/M2

## 2023-09-22 DIAGNOSIS — C18.9 METASTATIC COLON CANCER TO LIVER (HCC): Primary | ICD-10-CM

## 2023-09-22 DIAGNOSIS — C50.411 MALIGNANT NEOPLASM OF UPPER-OUTER QUADRANT OF RIGHT BREAST IN FEMALE, ESTROGEN RECEPTOR POSITIVE: ICD-10-CM

## 2023-09-22 DIAGNOSIS — Z17.0 MALIGNANT NEOPLASM OF UPPER-OUTER QUADRANT OF RIGHT BREAST IN FEMALE, ESTROGEN RECEPTOR POSITIVE: ICD-10-CM

## 2023-09-22 DIAGNOSIS — C09.9 RIGHT TONSILLAR SQUAMOUS CELL CARCINOMA (HCC): ICD-10-CM

## 2023-09-22 DIAGNOSIS — C78.7 METASTATIC COLON CANCER TO LIVER (HCC): Primary | ICD-10-CM

## 2023-09-22 LAB
ALBUMIN SERPL BCP-MCNC: 3.4 G/DL (ref 3.5–5)
ALP SERPL-CCNC: 117 U/L (ref 34–104)
ALT SERPL W P-5'-P-CCNC: 69 U/L (ref 7–52)
ANION GAP SERPL CALCULATED.3IONS-SCNC: 6 MMOL/L
AST SERPL W P-5'-P-CCNC: 79 U/L (ref 13–39)
BASOPHILS # BLD AUTO: 0.02 THOUSANDS/ÂΜL (ref 0–0.1)
BASOPHILS NFR BLD AUTO: 1 % (ref 0–1)
BILIRUB SERPL-MCNC: 0.46 MG/DL (ref 0.2–1)
BUN SERPL-MCNC: 13 MG/DL (ref 5–25)
CALCIUM ALBUM COR SERPL-MCNC: 9.7 MG/DL (ref 8.3–10.1)
CALCIUM SERPL-MCNC: 9.2 MG/DL (ref 8.4–10.2)
CEA SERPL-MCNC: 5.2 NG/ML (ref 0–3)
CHLORIDE SERPL-SCNC: 107 MMOL/L (ref 96–108)
CO2 SERPL-SCNC: 25 MMOL/L (ref 21–32)
CREAT SERPL-MCNC: 0.72 MG/DL (ref 0.6–1.3)
EOSINOPHIL # BLD AUTO: 0.07 THOUSAND/ÂΜL (ref 0–0.61)
EOSINOPHIL NFR BLD AUTO: 2 % (ref 0–6)
ERYTHROCYTE [DISTWIDTH] IN BLOOD BY AUTOMATED COUNT: 17.2 % (ref 11.6–15.1)
GFR SERPL CREATININE-BSD FRML MDRD: 86 ML/MIN/1.73SQ M
GLUCOSE SERPL-MCNC: 105 MG/DL (ref 65–140)
HCT VFR BLD AUTO: 27.8 % (ref 34.8–46.1)
HGB BLD-MCNC: 8.7 G/DL (ref 11.5–15.4)
IMM GRANULOCYTES # BLD AUTO: 0.01 THOUSAND/UL (ref 0–0.2)
IMM GRANULOCYTES NFR BLD AUTO: 0 % (ref 0–2)
LYMPHOCYTES # BLD AUTO: 1.29 THOUSANDS/ÂΜL (ref 0.6–4.47)
LYMPHOCYTES NFR BLD AUTO: 38 % (ref 14–44)
MCH RBC QN AUTO: 24.9 PG (ref 26.8–34.3)
MCHC RBC AUTO-ENTMCNC: 31.3 G/DL (ref 31.4–37.4)
MCV RBC AUTO: 79 FL (ref 82–98)
MONOCYTES # BLD AUTO: 0.38 THOUSAND/ÂΜL (ref 0.17–1.22)
MONOCYTES NFR BLD AUTO: 11 % (ref 4–12)
NEUTROPHILS # BLD AUTO: 1.67 THOUSANDS/ÂΜL (ref 1.85–7.62)
NEUTS SEG NFR BLD AUTO: 48 % (ref 43–75)
NRBC BLD AUTO-RTO: 0 /100 WBCS
PLATELET # BLD AUTO: 167 THOUSANDS/UL (ref 149–390)
PMV BLD AUTO: 9.6 FL (ref 8.9–12.7)
POTASSIUM SERPL-SCNC: 4 MMOL/L (ref 3.5–5.3)
PROT SERPL-MCNC: 7.1 G/DL (ref 6.4–8.4)
RBC # BLD AUTO: 3.5 MILLION/UL (ref 3.81–5.12)
SODIUM SERPL-SCNC: 138 MMOL/L (ref 135–147)
WBC # BLD AUTO: 3.44 THOUSAND/UL (ref 4.31–10.16)

## 2023-09-22 PROCEDURE — 85025 COMPLETE CBC W/AUTO DIFF WBC: CPT | Performed by: INTERNAL MEDICINE

## 2023-09-22 PROCEDURE — 99214 OFFICE O/P EST MOD 30 MIN: CPT | Performed by: INTERNAL MEDICINE

## 2023-09-22 PROCEDURE — 36593 DECLOT VASCULAR DEVICE: CPT

## 2023-09-22 PROCEDURE — 82378 CARCINOEMBRYONIC ANTIGEN: CPT | Performed by: INTERNAL MEDICINE

## 2023-09-22 PROCEDURE — 80053 COMPREHEN METABOLIC PANEL: CPT | Performed by: INTERNAL MEDICINE

## 2023-09-22 RX ORDER — DEXTROSE MONOHYDRATE 50 MG/ML
20 INJECTION, SOLUTION INTRAVENOUS ONCE
OUTPATIENT
Start: 2023-10-09

## 2023-09-22 RX ORDER — SODIUM CHLORIDE 9 MG/ML
20 INJECTION, SOLUTION INTRAVENOUS ONCE AS NEEDED
OUTPATIENT
Start: 2023-10-09

## 2023-09-22 RX ORDER — FLUOROURACIL 50 MG/ML
900 INJECTION, SOLUTION INTRAVENOUS ONCE
OUTPATIENT
Start: 2023-10-09

## 2023-09-22 RX ADMIN — ALTEPLASE 2 MG: 2.2 INJECTION, POWDER, LYOPHILIZED, FOR SOLUTION INTRAVENOUS at 10:03

## 2023-09-22 NOTE — TELEPHONE ENCOUNTER
Left VM for patient with follow-up visit details on Oct 17th with Dr. Hillary Avilez. My direct teams number was left for her to callback if needed.

## 2023-09-22 NOTE — PROGRESS NOTES
Port accessed, central labs drawn and port de-accessed without incident. Patient declined printed AVS.  Patient to return to department as scheduled.

## 2023-09-24 NOTE — PROGRESS NOTES
HEMATOLOGY / 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   - grade 2  - %  - HI 95%  - HER2 negative     2018 Surgery    Right partial mastectomy and SLN biopsy:  Infiltrating ductal carcinoma, Grade 2/3, felt to be between 2.0 cm and 2.5 cm in greatest dimension  - No invasive tumor is seen at inked margins, but there is a focus of DCIS seen within approximately 1mm of the inked medial margin  - no LVI  - no LCIS  - 0/2 LN's  at least Stage IIA - pT2, pN0, cM0, G2.    - Dr Presley Dance     2018 -  Cancer Staged    Stage IIA - pT2, pN0, cM0, G2.       2019 Genomic Testing    Oncotype DX score: 13     2019 -  Hormone Therapy    anastrozole 1 mg daily as adjuvant endocrine therapy    - Dr Yovani Chavez       2019 - 4/3/2019 Radiation    Course: C1    Plan ID Energy Fractions Dose per Fraction (cGy) Dose Correction (cGy) Total Dose Delivered (cGy) Elapsed Days   R Breast 10X/6X 25 / 25 200 0 5,000 40   R BRST BOOST 16E 5 / 5 200 0 1,000 7      Treatment dates:  C1: 2019 - 4/3/2019       Metastatic colon cancer to liver ([address])   2023 Genetic Testing    CARIS Results:   • KRAS Pathogenic Variant Exon 2  p.G12D : lack of benefit of cetuximab, panitumumab Level 2   • No other actionable mutation; MSI stable; TMB low   • MiFOLOXAi Results: "Decreased Benefit of FOLFOX + Bevacizumab in first line metastatic CRC. This patient may achieve improved results by receiving an alternative to FOLFOX, such as FOLFIRI, as their initial regimen. As an adjustment to frontline FOLFOXIRI following toxicity: This patient may achieve improved results by removing the oxaliplatin portion of their regimen".          7/11/2023 Initial Diagnosis    Metastatic colon cancer to liver ([address])     7/13/2023 -  Cancer Staged    Staging form: Colon and Rectum, AJCC 8th Edition  - Clinical stage from 7/13/2023: Stage Unknown (cTX, cNX, pM1) - Signed by Domingo Sanford DO on 7/13/2023 7/31/2023 -  Chemotherapy    alteplase (CATHFLO), 2 mg, Intracatheter, Every 1 Minute as needed, 4 of 12 cycles  fluorouracil (ADRUCIL), 895 mg, Intravenous, Once, 4 of 12 cycles  Administration: 900 mg (7/31/2023), 900 mg (8/14/2023), 900 mg (8/28/2023), 900 mg (9/11/2023)  leucovorin calcium IVPB, 896 mg, Intravenous, Once, 4 of 12 cycles  Administration: 900 mg (7/31/2023), 900 mg (8/14/2023), 900 mg (8/28/2023), 900 mg (9/11/2023)  oxaliplatin (ELOXATIN) chemo infusion, 85 mg/m2 = 190.4 mg, Intravenous, Once, 4 of 12 cycles  Administration: 190.4 mg (7/31/2023), 190.4 mg (8/14/2023), 200 mg (8/28/2023), 200 mg (9/11/2023)  fluorouracil (ADRUCIL) ambulatory infusion Soln, 1,200 mg/m2/day = 5,375 mg, Intravenous, Over 46 hours, 4 of 12 cycles     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[address])   7/27/2023 Initial Diagnosis    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[address])     7/27/2023 -  Cancer Staged    Staging form: Pharynx - Oropharynx, AJCC 8th Edition  - Clinical stage from 7/27/2023: Stage III (cT1, cN3, cM0, p16+) - Signed by Adis Morales MD on 7/27/2023  Stage prefix: Initial diagnosis           Interval History: Patient presents for follow up of her metastatic colon cancer and squamous cell carcinoma of the tonsil. She is due for cycle 5 of FOLFOX. Fatigue is starting to build up. She when she does normal daily activities she is very tired. She denies any neuropathy. No diarrhea. No bright red blood per rectum. No fevers or infections. No mouth sores. She has noticed the mass in her neck has gotten smaller. REVIEW OF SYSTEMS:  Please note that a 14-point review of systems was performed to include Constitutional, HEENT, Respiratory, CVS, GI, , Musculoskeletal, Integumentary, Neurologic, Rheumatologic, Endocrinologic, Psychiatric, Lymphatic, and Hematologic/Oncologic systems were reviewed and are negative unless otherwise stated in HPI. Positive and negative findings pertinent to this evaluation are incorporated into the history of present illness. ECOG PS: 0    PROBLEM LIST:  Patient Active Problem List   Diagnosis   • Primary hypertension   • Vasomotor rhinitis   • Mixed hyperlipidemia   • Screen for colon cancer   • Malignant neoplasm of right female breast ([address])   • Vitamin D deficiency   • Encounter for screening for HIV   • Prediabetes   • Cervical lymphadenopathy   • Follow-up examination   • Right thyroid nodule   • GERD (gastroesophageal reflux disease)   • Stroke ([address])   • Obesity   • Metastatic colon cancer to liver [facility])   • 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HCC)   • Poor appetite   • Anemia   • Body mass index (BMI) 40.0-44.9, adult (HCC)       Assessment / Plan: 60-year-old female with metastatic colon cancer and at least a locally advanced squamous of carcinoma of the tonsil. I am happy to say she is having disease response in the mass in her neck with FOLFOX. We discussed dose reducing her chemo by 10% due to fatigue. She does not want to do that yet but will keep that in mind if the fatigue starts to build up excessively. We will proceed with cycle 5 and she will see my nurse practitioner prior to cycle 6.   I will see her prior to cycle 7 with a PET scan to assess disease response and make future treatment plans. I spent 30 minutes on chart review, face to face counseling time, coordination of care and documentation. Past Medical History:   has a past medical history of Anemia, Arthritis, Breast cancer ([address]) (12/17/2018), Cancer ([address]), GERD (gastroesophageal reflux disease), Hyperlipidemia, Hypertension, Obesity, Stroke ([address]), and Transaminitis (09/22/2021). PAST SURGICAL HISTORY:   has a past surgical history that includes US guided breast biopsy right complete (Right, 11/23/2018); Breast surgery; pr mastectomy partial w/axillary lymphadenectomy (Right, 12/20/2018); Tubal ligation; Breast biopsy (Right, 11/23/2018); US guided injection for research study (12/20/2018); US guided injection for research study (12/7/2018); US guided injection for research study (5/3/2019); US guided lymph node biopsy right (5/26/2023); IR biopsy liver mass (6/27/2023); pr laryngoscopy w/biopsy microscope/telescope (N/A, 7/20/2023); pr [facility] incl fluor gdnce dx w/cell washg spx (N/A, 7/20/2023); ESOPHAGOSCOPY (N/A, 7/20/2023); and IR port placement (7/28/2023).     CURRENT MEDICATIONS  Current Outpatient Medications   Medication Sig Dispense Refill   • anastrozole (ARIMIDEX) 1 mg tablet Take 1 tablet (1 mg total) by mouth daily 90 tablet 3   • atorvastatin (LIPITOR) 40 mg tablet Take 1 tablet (40 mg total) by mouth daily at bedtime 30 tablet 2   • Cyanocobalamin (B-12 PO) Take by mouth     • [START ON 9/25/2023] fluorouracil 5,375 mg in CADD/Elastomeric Infusion Device Infuse 5,375 mg (1,200 mg/m2/day x 2.24 m2) into a catheter in a vein via infusion device over 46 hours for 2 days  Infusion planned for September 25, 2023. 1 each 0   • FOLIC ACID PO Take by mouth     • lidocaine-prilocaine (EMLA) cream Apply topically as needed for mild pain 30 g 3   • losartan (COZAAR) 50 mg tablet Take 1 tablet (50 mg total) by mouth daily 90 tablet 1   • Magnesium Oxide 400 MG CAPS Take 1 tablet (400 mg total) by mouth 2 (two) times a day 14 capsule 0   • omeprazole (PriLOSEC) 20 mg delayed release capsule Take 1 capsule (20 mg total) by mouth daily 30 capsule 5   • ondansetron (ZOFRAN) 8 mg tablet Take 1 tablet (8 mg total) by mouth every 8 (eight) hours as needed for nausea or vomiting 30 tablet 3   • potassium chloride (K-DUR,KLOR-CON) 20 mEq tablet Take 1 tablet (20 mEq total) by mouth 2 (two) times a day for 7 days 14 tablet 0   • prochlorperazine (COMPAZINE) 10 mg tablet Take 1 tablet (10 mg total) by mouth every 6 (six) hours as needed for nausea or vomiting 30 tablet 3   • VITAMIN D PO Take by mouth     • polyethylene glycol (GOLYTELY) 4000 mL solution Take 4,000 mL by mouth once for 1 dose 4000 mL 0     No current facility-administered medications for this visit. Facility-Administered Medications Ordered in Other Visits   Medication Dose Route Frequency Provider Last Rate Last Admin   • alteplase (CATHFLO) injection 2 mg  2 mg Intracatheter Q1MIN PRN [place]ch DO   2 mg at 09/22/23 1003     [unfilled]    SOCIAL HISTORY:   reports that she has never smoked. She has never used smokeless tobacco. She reports that she does not drink alcohol and does not use drugs. FAMILY HISTORY:  family history includes Colon cancer (age of onset: 62) in her mother; Hypertension in her daughter, mother, and son; Pancreatic cancer (age of onset: 61) in her father. ALLERGIES:  has No Known Allergies. Physical Exam:  Vital Signs:   Visit Vitals  /86 (BP Location: Left arm, Patient Position: Sitting, Cuff Size: Adult)   Pulse 99   Temp 97.6 °F (36.4 °C)   Resp 17   Ht 5' 5.98" (1.676 m)   Wt 115 kg (253 lb 3.2 oz)   SpO2 98%   BMI 40.89 kg/m²   OB Status Postmenopausal   Smoking Status Never   BSA 2.21 m²     Body mass index is 40.89 kg/m². Body surface area is 2.21 meters squared.     GEN: Alert, awake oriented x3, in no acute distress  HEENT- No pallor, icterus, cyanosis, no oral mucosal lesions, right sided neck mass 3.5 x 5 cm  LAD - no palpable cervical, clavicle, axillary, inguinal LAD  Heart- normal S1 S2, regular rate and rhythm, No murmur, rubs.    Lungs- clear breathing sound bilateral.   Abdomen- soft, Non tender, bowel sounds present  Extremities- No cyanosis, clubbing, edema  Neuro- No focal neurological deficit    Labs:  Lab Results   Component Value Date    WBC 3.44 (L) 09/22/2023    HGB 8.7 (L) 09/22/2023    HCT 27.8 (L) 09/22/2023    MCV 79 (L) 09/22/2023     09/22/2023     Lab Results   Component Value Date    SODIUM 138 09/22/2023    K 4.0 09/22/2023     09/22/2023    CO2 25 09/22/2023    AGAP 6 09/22/2023    BUN 13 09/22/2023    CREATININE 0.72 09/22/2023    GLUC 105 09/22/2023    GLUF 110 (H) 08/19/2023    CALCIUM 9.2 09/22/2023    AST 79 (H) 09/22/2023    ALT 69 (H) 09/22/2023    ALKPHOS 117 (H) 09/22/2023    TP 7.1 09/22/2023    TBILI 0.46 09/22/2023    EGFR 86 09/22/2023

## 2023-09-25 ENCOUNTER — HOSPITAL ENCOUNTER (OUTPATIENT)
Dept: INFUSION CENTER | Facility: CLINIC | Age: 68
Discharge: HOME | End: 2023-09-25
Payer: MEDICARE

## 2023-09-25 VITALS
RESPIRATION RATE: 18 BRPM | HEIGHT: 66 IN | TEMPERATURE: 97.5 F | HEART RATE: 68 BPM | WEIGHT: 256.84 LBS | SYSTOLIC BLOOD PRESSURE: 116 MMHG | DIASTOLIC BLOOD PRESSURE: 70 MMHG | BODY MASS INDEX: 41.28 KG/M2

## 2023-09-25 DIAGNOSIS — C18.9 METASTATIC COLON CANCER TO LIVER (HCC): Primary | ICD-10-CM

## 2023-09-25 DIAGNOSIS — C78.7 METASTATIC COLON CANCER TO LIVER (HCC): Primary | ICD-10-CM

## 2023-09-25 PROCEDURE — 96411 CHEMO IV PUSH ADDL DRUG: CPT

## 2023-09-25 PROCEDURE — G0498 CHEMO EXTEND IV INFUS W/PUMP: HCPCS

## 2023-09-25 PROCEDURE — 96367 TX/PROPH/DG ADDL SEQ IV INF: CPT

## 2023-09-25 PROCEDURE — 96415 CHEMO IV INFUSION ADDL HR: CPT

## 2023-09-25 PROCEDURE — 96413 CHEMO IV INFUSION 1 HR: CPT

## 2023-09-25 PROCEDURE — 96368 THER/DIAG CONCURRENT INF: CPT

## 2023-09-25 RX ORDER — DEXTROSE MONOHYDRATE 50 MG/ML
20 INJECTION, SOLUTION INTRAVENOUS ONCE
Status: COMPLETED | OUTPATIENT
Start: 2023-09-25 | End: 2023-09-25

## 2023-09-25 RX ORDER — FLUOROURACIL 50 MG/ML
900 INJECTION, SOLUTION INTRAVENOUS ONCE
Status: COMPLETED | OUTPATIENT
Start: 2023-09-25 | End: 2023-09-25

## 2023-09-25 RX ORDER — SODIUM CHLORIDE 9 MG/ML
20 INJECTION, SOLUTION INTRAVENOUS ONCE AS NEEDED
Status: DISCONTINUED | OUTPATIENT
Start: 2023-09-25 | End: 2023-09-28 | Stop reason: HOSPADM

## 2023-09-25 RX ADMIN — LEUCOVORIN CALCIUM 900 MG: 500 INJECTION, POWDER, LYOPHILIZED, FOR SOLUTION INTRAMUSCULAR; INTRAVENOUS at 09:23

## 2023-09-25 RX ADMIN — FLUOROURACIL 900 MG: 50 INJECTION, SOLUTION INTRAVENOUS at 11:19

## 2023-09-25 RX ADMIN — SODIUM CHLORIDE 20 ML/HR: 9 INJECTION, SOLUTION INTRAVENOUS at 08:35

## 2023-09-25 RX ADMIN — DEXAMETHASONE SODIUM PHOSPHATE: 10 INJECTION, SOLUTION INTRAMUSCULAR; INTRAVENOUS at 08:37

## 2023-09-25 RX ADMIN — DEXTROSE 20 ML/HR: 5 SOLUTION INTRAVENOUS at 08:37

## 2023-09-25 RX ADMIN — OXALIPLATIN 200 MG: 5 INJECTION, SOLUTION INTRAVENOUS at 09:23

## 2023-09-25 NOTE — PROGRESS NOTES
Pt presents for oxaliplatin, leucovorin, 5FU push, 5FU BRANDIE. No new meds or concerns. Pt tolerated infusion without adverse reaction. BRANDIE connected per protocol. Pt is aware of trouble shooting, when to call [facility] and the office. Pt is aware of when to return for d/c. Future appointments discussed. AVS declined.

## 2023-09-27 ENCOUNTER — HOSPITAL ENCOUNTER (OUTPATIENT)
Dept: INFUSION CENTER | Facility: CLINIC | Age: 68
Discharge: HOME | End: 2023-09-27

## 2023-09-27 ENCOUNTER — PATIENT OUTREACH (OUTPATIENT)
Dept: HEMATOLOGY ONCOLOGY | Facility: CLINIC | Age: 68
End: 2023-09-27

## 2023-09-27 VITALS
DIASTOLIC BLOOD PRESSURE: 82 MMHG | HEART RATE: 86 BPM | RESPIRATION RATE: 18 BRPM | TEMPERATURE: 96.7 F | SYSTOLIC BLOOD PRESSURE: 142 MMHG

## 2023-09-27 DIAGNOSIS — C18.9 METASTATIC COLON CANCER TO LIVER (HCC): Primary | ICD-10-CM

## 2023-09-27 DIAGNOSIS — C78.7 METASTATIC COLON CANCER TO LIVER (HCC): Primary | ICD-10-CM

## 2023-09-27 NOTE — PROGRESS NOTES
Pt presents for BRANDIE d/c. BRANDIE deflated. No new meds or concerns. Pt tolerated treatment without adverse reaction. Future appointments discussed. AVS given.

## 2023-09-27 NOTE — PROGRESS NOTES
Pt called stating she had a 9:30am appt in infusion this morning, and did not receive confirmation about her transportation. I reached out to  BRANDON and Viviana Rajan stated he did not have her infusion schedule, but [facility]YFT  Will be set up right away.     I called the infusion center, spoke with Darion Diaz, and explained that the pt will be late, and her schedule needs to be sent over to  [facility]

## 2023-10-02 ENCOUNTER — DOCUMENTATION (OUTPATIENT)
Dept: HEMATOLOGY ONCOLOGY | Facility: CLINIC | Age: 68
End: 2023-10-02

## 2023-10-02 LAB — SCAN RESULT: NORMAL

## 2023-10-02 NOTE — PROGRESS NOTES
E mail sent to [place] to confirm transportation for pts scheduled medical oncology appts and PET CT.     Confirmation e mail received back from [facility], pt is all set up

## 2023-10-03 DIAGNOSIS — C18.9 METASTATIC COLON CANCER TO LIVER (HCC): Primary | ICD-10-CM

## 2023-10-03 DIAGNOSIS — C78.7 METASTATIC COLON CANCER TO LIVER (HCC): Primary | ICD-10-CM

## 2023-10-04 DIAGNOSIS — C18.9 METASTATIC COLON CANCER TO LIVER (HCC): Primary | ICD-10-CM

## 2023-10-04 DIAGNOSIS — C78.7 METASTATIC COLON CANCER TO LIVER (HCC): Primary | ICD-10-CM

## 2023-10-06 ENCOUNTER — PATIENT OUTREACH (OUTPATIENT)
Dept: HEMATOLOGY ONCOLOGY | Facility: CLINIC | Age: 68
End: 2023-10-06

## 2023-10-06 ENCOUNTER — HOSPITAL ENCOUNTER (OUTPATIENT)
Dept: INFUSION CENTER | Facility: CLINIC | Age: 68
End: 2023-10-06
Payer: MEDICARE

## 2023-10-06 ENCOUNTER — OFFICE VISIT (OUTPATIENT)
Dept: HEMATOLOGY ONCOLOGY | Facility: CLINIC | Age: 68
End: 2023-10-06
Payer: MEDICARE

## 2023-10-06 ENCOUNTER — TELEPHONE (OUTPATIENT)
Dept: HEMATOLOGY ONCOLOGY | Facility: CLINIC | Age: 68
End: 2023-10-06

## 2023-10-06 VITALS
SYSTOLIC BLOOD PRESSURE: 141 MMHG | RESPIRATION RATE: 18 BRPM | TEMPERATURE: 97 F | HEART RATE: 92 BPM | DIASTOLIC BLOOD PRESSURE: 89 MMHG

## 2023-10-06 VITALS
SYSTOLIC BLOOD PRESSURE: 127 MMHG | RESPIRATION RATE: 14 BRPM | DIASTOLIC BLOOD PRESSURE: 82 MMHG | OXYGEN SATURATION: 99 % | BODY MASS INDEX: 40.66 KG/M2 | WEIGHT: 253 LBS | TEMPERATURE: 97.3 F | HEIGHT: 66 IN | HEART RATE: 110 BPM

## 2023-10-06 DIAGNOSIS — K59.03 DRUG-INDUCED CONSTIPATION: ICD-10-CM

## 2023-10-06 DIAGNOSIS — E86.0 DEHYDRATION: Primary | ICD-10-CM

## 2023-10-06 DIAGNOSIS — C18.9 METASTATIC COLON CANCER TO LIVER (HCC): Primary | ICD-10-CM

## 2023-10-06 DIAGNOSIS — C09.9 RIGHT TONSILLAR SQUAMOUS CELL CARCINOMA (HCC): ICD-10-CM

## 2023-10-06 DIAGNOSIS — C78.7 METASTATIC COLON CANCER TO LIVER (HCC): ICD-10-CM

## 2023-10-06 DIAGNOSIS — C18.9 METASTATIC COLON CANCER TO LIVER (HCC): ICD-10-CM

## 2023-10-06 DIAGNOSIS — E86.0 DEHYDRATION: ICD-10-CM

## 2023-10-06 DIAGNOSIS — C78.7 METASTATIC COLON CANCER TO LIVER (HCC): Primary | ICD-10-CM

## 2023-10-06 LAB
ALBUMIN SERPL BCP-MCNC: 3.5 G/DL (ref 3.5–5)
ALP SERPL-CCNC: 120 U/L (ref 34–104)
ALT SERPL W P-5'-P-CCNC: 57 U/L (ref 7–52)
ANION GAP SERPL CALCULATED.3IONS-SCNC: 8 MMOL/L
AST SERPL W P-5'-P-CCNC: 65 U/L (ref 13–39)
BASOPHILS # BLD AUTO: 0.03 THOUSANDS/ÂΜL (ref 0–0.1)
BASOPHILS NFR BLD AUTO: 1 % (ref 0–1)
BILIRUB SERPL-MCNC: 0.47 MG/DL (ref 0.2–1)
BUN SERPL-MCNC: 14 MG/DL (ref 5–25)
CALCIUM SERPL-MCNC: 8.9 MG/DL (ref 8.4–10.2)
CHLORIDE SERPL-SCNC: 105 MMOL/L (ref 96–108)
CO2 SERPL-SCNC: 26 MMOL/L (ref 21–32)
CREAT SERPL-MCNC: 0.67 MG/DL (ref 0.6–1.3)
EOSINOPHIL # BLD AUTO: 0.06 THOUSAND/ÂΜL (ref 0–0.61)
EOSINOPHIL NFR BLD AUTO: 1 % (ref 0–6)
ERYTHROCYTE [DISTWIDTH] IN BLOOD BY AUTOMATED COUNT: 18.1 % (ref 11.6–15.1)
GFR SERPL CREATININE-BSD FRML MDRD: 91 ML/MIN/1.73SQ M
GLUCOSE SERPL-MCNC: 96 MG/DL (ref 65–140)
HCT VFR BLD AUTO: 29.7 % (ref 34.8–46.1)
HGB BLD-MCNC: 9.3 G/DL (ref 11.5–15.4)
IMM GRANULOCYTES # BLD AUTO: 0.02 THOUSAND/UL (ref 0–0.2)
IMM GRANULOCYTES NFR BLD AUTO: 1 % (ref 0–2)
LYMPHOCYTES # BLD AUTO: 1.65 THOUSANDS/ÂΜL (ref 0.6–4.47)
LYMPHOCYTES NFR BLD AUTO: 39 % (ref 14–44)
MCH RBC QN AUTO: 24.9 PG (ref 26.8–34.3)
MCHC RBC AUTO-ENTMCNC: 31.3 G/DL (ref 31.4–37.4)
MCV RBC AUTO: 79 FL (ref 82–98)
MONOCYTES # BLD AUTO: 0.38 THOUSAND/ÂΜL (ref 0.17–1.22)
MONOCYTES NFR BLD AUTO: 9 % (ref 4–12)
NEUTROPHILS # BLD AUTO: 2.14 THOUSANDS/ÂΜL (ref 1.85–7.62)
NEUTS SEG NFR BLD AUTO: 49 % (ref 43–75)
NRBC BLD AUTO-RTO: 0 /100 WBCS
PLATELET # BLD AUTO: 201 THOUSANDS/UL (ref 149–390)
PMV BLD AUTO: 10.9 FL (ref 8.9–12.7)
POTASSIUM SERPL-SCNC: 3.4 MMOL/L (ref 3.5–5.3)
PROT SERPL-MCNC: 7.2 G/DL (ref 6.4–8.4)
RBC # BLD AUTO: 3.74 MILLION/UL (ref 3.81–5.12)
SODIUM SERPL-SCNC: 139 MMOL/L (ref 135–147)
WBC # BLD AUTO: 4.28 THOUSAND/UL (ref 4.31–10.16)

## 2023-10-06 PROCEDURE — 99214 OFFICE O/P EST MOD 30 MIN: CPT

## 2023-10-06 PROCEDURE — 96360 HYDRATION IV INFUSION INIT: CPT

## 2023-10-06 PROCEDURE — 85025 COMPLETE CBC W/AUTO DIFF WBC: CPT | Performed by: INTERNAL MEDICINE

## 2023-10-06 PROCEDURE — 80053 COMPREHEN METABOLIC PANEL: CPT | Performed by: INTERNAL MEDICINE

## 2023-10-06 RX ADMIN — SODIUM CHLORIDE 1000 ML: 0.9 INJECTION, SOLUTION INTRAVENOUS at 13:17

## 2023-10-06 NOTE — TELEPHONE ENCOUNTER
Please inform infusion center I added hydration 1L NSS on disconnect day.   I also adjusted the times of the appt

## 2023-10-06 NOTE — PROGRESS NOTES
Pt called stating she did not receive a call for her  [facility]  time. As we were on the phone, they showed up.  She has my contact information if further assistance is needed

## 2023-10-06 NOTE — PROGRESS NOTES
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   - grade 2  - %  - TX 95%  - HER2 negative     2018 Surgery    Right partial mastectomy and SLN biopsy:  Infiltrating ductal carcinoma, Grade 2/3, felt to be between 2.0 cm and 2.5 cm in greatest dimension  - No invasive tumor is seen at inked margins, but there is a focus of DCIS seen within approximately 1mm of the inked medial margin  - no LVI  - no LCIS  - 0/2 LN's  at least Stage IIA - pT2, pN0, cM0, G2.    - Dr Madison Yang     2018 -  Cancer Staged    Stage IIA - pT2, pN0, cM0, G2.       2019 Genomic Testing    Oncotype DX score: 13     2019 -  Hormone Therapy    anastrozole 1 mg daily as adjuvant endocrine therapy    - Dr Fredi Stern       2019 - 4/3/2019 Radiation    Course: C1    Plan ID Energy Fractions Dose per Fraction (cGy) Dose Correction (cGy) Total Dose Delivered (cGy) Elapsed Days   R Breast 10X/6X 25 / 25 200 0 5,000 40   R BRST BOOST 16E 5 / 5 200 0 1,000 7      Treatment dates:  C1: 2019 - 4/3/2019       Metastatic colon cancer to liver ([address])   2023 Genetic Testing    CARIS Results:   • KRAS Pathogenic Variant Exon 2  p.G12D : lack of benefit of cetuximab, panitumumab Level 2   • No other actionable mutation; MSI stable; TMB low   • MiFOLOXAi Results: "Decreased Benefit of FOLFOX + Bevacizumab in first line metastatic CRC. This patient may achieve improved results by receiving an alternative to FOLFOX, such as FOLFIRI, as their initial regimen. As an adjustment to frontline FOLFOXIRI following toxicity: This patient may achieve improved results by removing the oxaliplatin portion of their regimen".          7/11/2023 Initial Diagnosis    Metastatic colon cancer to liver ([address])     7/13/2023 -  Cancer Staged    Staging form: Colon and Rectum, AJCC 8th Edition  - Clinical stage from 7/13/2023: cT3, cN0, pM1 - Signed by Carly Ya DO on 9/28/2023  Total positive nodes: 0       7/31/2023 -  Chemotherapy    alteplase (CATHFLO), 2 mg, Intracatheter, Every 1 Minute as needed, 5 of 12 cycles  fluorouracil (ADRUCIL), 895 mg, Intravenous, Once, 5 of 12 cycles  Administration: 900 mg (7/31/2023), 900 mg (8/14/2023), 900 mg (8/28/2023), 900 mg (9/11/2023), 900 mg (9/25/2023)  leucovorin calcium IVPB, 896 mg, Intravenous, Once, 5 of 12 cycles  Administration: 900 mg (7/31/2023), 900 mg (8/14/2023), 900 mg (8/28/2023), 900 mg (9/11/2023), 900 mg (9/25/2023)  oxaliplatin (ELOXATIN) chemo infusion, 85 mg/m2 = 190.4 mg, Intravenous, Once, 5 of 12 cycles  Administration: 190.4 mg (7/31/2023), 190.4 mg (8/14/2023), 200 mg (8/28/2023), 200 mg (9/11/2023), 200 mg (9/25/2023)  fluorouracil (ADRUCIL) ambulatory infusion Soln, 1,200 mg/m2/day = 5,375 mg, Intravenous, Over 46 hours, 5 of 12 cycles     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[address])   7/27/2023 Initial Diagnosis    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[address])     7/27/2023 -  Cancer Staged    Staging form: Pharynx - Oropharynx, AJCC 8th Edition  - Clinical stage from 7/27/2023: Stage III (cT1, cN3, cM0, p16+) - Signed by Reginaldo Grossman MD on 7/27/2023  Stage prefix: Initial diagnosis Interval History: Patient presents for a follow up of her metastatic colon cancer and squamous cell carcinoma of the tonsil prior to cycle 6 of FOLFOX. Patient tolerated cycle 5 fairly well. Patient reports she starts experiencing increased fatigue 3 days after treatment, which is Thursday and lasts through the weekend and starts recovering by Monday. Patient reports the fatigue is manageable. She also reports constipation, sometimes has a BM every 3 days. Gina Kinney has increased dizziness, reports she attempts to stay hydrated, drinking 3-16oz bottles of water a day. 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  Denies nausea, vomiting, fevers, infections, CP, or SOB. Gina Kinney is eating well. She is also drinking Ensures in between meals. Patient will obtain labs after office visit. REVIEW OF SYSTEMS:  Please note that a 14-point review of systems was performed to include Constitutional, HEENT, Respiratory, CVS, GI, , Musculoskeletal, Integumentary, Neurologic, Rheumatologic, Endocrinologic, Psychiatric, Lymphatic, and Hematologic/Oncologic systems were reviewed and are negative unless otherwise stated in HPI. Positive and negative findings pertinent to this evaluation are incorporated into the history of present illness.       ECOG PS: 0     PROBLEM LIST:  Patient Active Problem List   Diagnosis   • Primary hypertension   • Vasomotor rhinitis   • Mixed hyperlipidemia   • Screen for colon cancer   • Malignant neoplasm of right female breast ([address])   • Vitamin D deficiency   • Encounter for screening for HIV   • Prediabetes   • Cervical lymphadenopathy   • Follow-up examination   • Right thyroid nodule   • GERD (gastroesophageal reflux disease)   • Stroke [facility])   • Obesity   • Metastatic colon cancer to liver [facility])   • 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HCC)   • Poor appetite   • Anemia   • Body mass index (BMI) 40.0-44.9, adult (HCC)   • Dehydration       Assessment / Plan:  We will proceed with cycle 6 of FOLFOX at current dose pending labs. Patient declined a dose reduction at this time. Patient agreeable to hydration today and we will setup hydration on her disconnect days (1L NSS). I will check on her on Monday to see if her dizziness is improved. Recommend she take colace daily to aide with constipation, script sent to pharmacy. Advised patient to inform us if her fatigue worsens and we can discuss a dose reduction at that time. She will get a PET/CT 10/12/23 and will see Dr. Rosaura Gore in the office 10/17, prior to cycle 7. Patient aware to contact us for worsening symptoms or for additional questions/concerns. I spent 30 minutes on chart review, face to face counseling time, coordination of care and documentation. Past Medical History:   has a past medical history of Anemia, Arthritis, Breast cancer ([address]) (12/17/2018), Cancer ([address]), GERD (gastroesophageal reflux disease), Hyperlipidemia, Hypertension, Obesity, Stroke ([address]), and Transaminitis (09/22/2021). PAST SURGICAL HISTORY:   has a past surgical history that includes US guided breast biopsy right complete (Right, 11/23/2018); Breast surgery; pr mastectomy partial w/axillary lymphadenectomy (Right, 12/20/2018); Tubal ligation; Breast biopsy (Right, 11/23/2018); US guided injection for research study (12/20/2018); US guided injection for research study (12/7/2018); US guided injection for research study (5/3/2019); US guided lymph node biopsy right (5/26/2023); IR biopsy liver mass (6/27/2023); pr laryngoscopy w/biopsy microscope/telescope (N/A, 7/20/2023); pr [facility] incl fluor gdnce dx w/cell washg spx (N/A, 7/20/2023); ESOPHAGOSCOPY (N/A, 7/20/2023); and IR port placement (7/28/2023).     CURRENT MEDICATIONS  Current Outpatient Medications   Medication Sig Dispense Refill   • anastrozole (ARIMIDEX) 1 mg tablet Take 1 tablet (1 mg total) by mouth daily 90 tablet 3   • atorvastatin (LIPITOR) 40 mg tablet Take 1 tablet (40 mg total) by mouth daily at bedtime 30 tablet 2   • Cyanocobalamin (B-12 PO) Take by mouth     • docusate sodium (COLACE) 50 mg capsule Take 1 capsule (50 mg total) by mouth 2 (two) times a day 90 capsule 1   • [START ON 10/9/2023] fluorouracil 5,375 mg in CADD/Elastomeric Infusion Device Infuse 5,375 mg (1,200 mg/m2/day x 2.24 m2) into a catheter in a vein via infusion device over 46 hours for 2 days  Infusion planned for October 9, 2023. 1 each 0   • FOLIC ACID PO Take by mouth     • lidocaine-prilocaine (EMLA) cream Apply topically as needed for mild pain 30 g 3   • losartan (COZAAR) 50 mg tablet Take 1 tablet (50 mg total) by mouth daily 90 tablet 1   • Magnesium Oxide 400 MG CAPS Take 1 tablet (400 mg total) by mouth 2 (two) times a day 14 capsule 0   • omeprazole (PriLOSEC) 20 mg delayed release capsule Take 1 capsule (20 mg total) by mouth daily 30 capsule 5   • ondansetron (ZOFRAN) 8 mg tablet Take 1 tablet (8 mg total) by mouth every 8 (eight) hours as needed for nausea or vomiting 30 tablet 3   • polyethylene glycol (GOLYTELY) 4000 mL solution Take 4,000 mL by mouth once for 1 dose 4000 mL 0   • potassium chloride (K-DUR,KLOR-CON) 20 mEq tablet Take 1 tablet (20 mEq total) by mouth 2 (two) times a day for 7 days 14 tablet 0   • prochlorperazine (COMPAZINE) 10 mg tablet Take 1 tablet (10 mg total) by mouth every 6 (six) hours as needed for nausea or vomiting 30 tablet 3   • VITAMIN D PO Take by mouth       No current facility-administered medications for this visit.      Facility-Administered Medications Ordered in Other Visits   Medication Dose Route Frequency Provider Last Rate Last Admin   • alteplase (CATHFLO) injection 2 mg  2 mg Intracatheter Q1MIN PRN Hildegarde Inches, DO       • sodium chloride 0.9 % bolus 1,000 mL  1,000 mL Intravenous Once Cathy Harrises, DO 1,000 mL/hr at 10/06/23 1317 1,000 mL at 10/06/23 1317     [unfilled]    SOCIAL HISTORY:   reports that she has never smoked. She has never used smokeless tobacco. She reports that she does not drink alcohol and does not use drugs. FAMILY HISTORY:  family history includes Colon cancer (age of onset: 62) in her mother; Hypertension in her daughter, mother, and son; Pancreatic cancer (age of onset: 61) in her father. ALLERGIES:  has No Known Allergies. Physical Exam:  Vital Signs:   Visit Vitals  /82 (BP Location: Left arm, Patient Position: Sitting, Cuff Size: Large)   Pulse (!) 110   Temp (!) 97.3 °F (36.3 °C) (Temporal)   Resp 14   Ht 5' 5.98" (1.676 m)   Wt 115 kg (253 lb)   SpO2 99%   BMI 40.86 kg/m²   OB Status Postmenopausal   Smoking Status Never   BSA 2.21 m²     Body mass index is 40.86 kg/m². Body surface area is 2.21 meters squared. GEN: Alert, awake oriented x3, in no acute distress  HEENT- No pallor, icterus, cyanosis, no oral mucosal lesions,   LAD - no palpable cervical, clavicle, axillary, inguinal LAD  Heart- normal S1 S2, regular rate and rhythm, No murmur, rubs.    Lungs- clear breathing sound bilateral.   Abdomen- soft, Non tender, bowel sounds present  Extremities- No cyanosis, clubbing, edema  Neuro- No focal neurological deficit    Labs:  Lab Results   Component Value Date    WBC 3.44 (L) 09/22/2023    HGB 8.7 (L) 09/22/2023    HCT 27.8 (L) 09/22/2023    MCV 79 (L) 09/22/2023     09/22/2023     Lab Results   Component Value Date    SODIUM 138 09/22/2023    K 4.0 09/22/2023     09/22/2023    CO2 25 09/22/2023    AGAP 6 09/22/2023    BUN 13 09/22/2023    CREATININE 0.72 09/22/2023    GLUC 105 09/22/2023    GLUF 110 (H) 08/19/2023    CALCIUM 9.2 09/22/2023    AST 79 (H) 09/22/2023    ALT 69 (H) 09/22/2023    ALKPHOS 117 (H) 09/22/2023    TP 7.1 09/22/2023    TBILI 0.46 09/22/2023    EGFR 86 09/22/2023

## 2023-10-06 NOTE — PROGRESS NOTES
Patient arrived to unit without incident. Patient had central labs collected and patient tolerated IV hydration without incident. AVS declined and patient aware of next appointment. Patient left in stable condition.

## 2023-10-09 ENCOUNTER — PATIENT OUTREACH (OUTPATIENT)
Dept: HEMATOLOGY ONCOLOGY | Facility: CLINIC | Age: 68
End: 2023-10-09

## 2023-10-09 ENCOUNTER — OFFICE VISIT (OUTPATIENT)
Dept: FAMILY MEDICINE CLINIC | Facility: CLINIC | Age: 68
End: 2023-10-09

## 2023-10-09 ENCOUNTER — TELEPHONE (OUTPATIENT)
Dept: HEMATOLOGY ONCOLOGY | Facility: CLINIC | Age: 68
End: 2023-10-09

## 2023-10-09 ENCOUNTER — NUTRITION (OUTPATIENT)
Dept: NUTRITION | Facility: CLINIC | Age: 68
End: 2023-10-09

## 2023-10-09 ENCOUNTER — HOSPITAL ENCOUNTER (OUTPATIENT)
Dept: INFUSION CENTER | Facility: CLINIC | Age: 68
Discharge: HOME | End: 2023-10-09
Payer: MEDICARE

## 2023-10-09 VITALS
HEART RATE: 103 BPM | TEMPERATURE: 98 F | BODY MASS INDEX: 42.82 KG/M2 | HEIGHT: 65 IN | DIASTOLIC BLOOD PRESSURE: 90 MMHG | WEIGHT: 257 LBS | RESPIRATION RATE: 18 BRPM | SYSTOLIC BLOOD PRESSURE: 142 MMHG | OXYGEN SATURATION: 98 %

## 2023-10-09 VITALS
WEIGHT: 255.73 LBS | BODY MASS INDEX: 41.1 KG/M2 | RESPIRATION RATE: 18 BRPM | HEART RATE: 93 BPM | SYSTOLIC BLOOD PRESSURE: 127 MMHG | HEIGHT: 66 IN | TEMPERATURE: 97.5 F | DIASTOLIC BLOOD PRESSURE: 83 MMHG

## 2023-10-09 DIAGNOSIS — I10 PRIMARY HYPERTENSION: Primary | ICD-10-CM

## 2023-10-09 DIAGNOSIS — C78.7 METASTATIC COLON CANCER TO LIVER (HCC): Primary | ICD-10-CM

## 2023-10-09 DIAGNOSIS — Z71.3 NUTRITIONAL COUNSELING: Primary | ICD-10-CM

## 2023-10-09 DIAGNOSIS — E78.2 MIXED HYPERLIPIDEMIA: ICD-10-CM

## 2023-10-09 DIAGNOSIS — C18.9 METASTATIC COLON CANCER TO LIVER (HCC): Primary | ICD-10-CM

## 2023-10-09 PROCEDURE — G0498 CHEMO EXTEND IV INFUS W/PUMP: HCPCS

## 2023-10-09 PROCEDURE — 96367 TX/PROPH/DG ADDL SEQ IV INF: CPT

## 2023-10-09 PROCEDURE — 99213 OFFICE O/P EST LOW 20 MIN: CPT | Performed by: FAMILY MEDICINE

## 2023-10-09 PROCEDURE — 96411 CHEMO IV PUSH ADDL DRUG: CPT

## 2023-10-09 PROCEDURE — 96413 CHEMO IV INFUSION 1 HR: CPT

## 2023-10-09 PROCEDURE — 96415 CHEMO IV INFUSION ADDL HR: CPT

## 2023-10-09 PROCEDURE — 96368 THER/DIAG CONCURRENT INF: CPT

## 2023-10-09 RX ORDER — LOSARTAN POTASSIUM 50 MG/1
50 TABLET ORAL DAILY
Qty: 90 TABLET | Refills: 1 | Status: SHIPPED | OUTPATIENT
Start: 2023-10-09

## 2023-10-09 RX ORDER — SODIUM CHLORIDE 9 MG/ML
20 INJECTION, SOLUTION INTRAVENOUS ONCE AS NEEDED
Status: DISCONTINUED | OUTPATIENT
Start: 2023-10-09 | End: 2023-10-12 | Stop reason: HOSPADM

## 2023-10-09 RX ORDER — FLUOROURACIL 50 MG/ML
900 INJECTION, SOLUTION INTRAVENOUS ONCE
Status: COMPLETED | OUTPATIENT
Start: 2023-10-09 | End: 2023-10-09

## 2023-10-09 RX ORDER — DEXTROSE MONOHYDRATE 50 MG/ML
20 INJECTION, SOLUTION INTRAVENOUS ONCE
Status: COMPLETED | OUTPATIENT
Start: 2023-10-09 | End: 2023-10-09

## 2023-10-09 RX ORDER — ATORVASTATIN CALCIUM 40 MG/1
40 TABLET, FILM COATED ORAL
Qty: 30 TABLET | Refills: 2 | Status: SHIPPED | OUTPATIENT
Start: 2023-10-09

## 2023-10-09 RX ADMIN — DEXTROSE 20 ML/HR: 5 SOLUTION INTRAVENOUS at 08:48

## 2023-10-09 RX ADMIN — FLUOROURACIL 900 MG: 50 INJECTION, SOLUTION INTRAVENOUS at 11:15

## 2023-10-09 RX ADMIN — OXALIPLATIN 200 MG: 5 INJECTION, SOLUTION INTRAVENOUS at 09:19

## 2023-10-09 RX ADMIN — LEUCOVORIN CALCIUM 900 MG: 500 INJECTION, POWDER, LYOPHILIZED, FOR SOLUTION INTRAMUSCULAR; INTRAVENOUS at 09:16

## 2023-10-09 RX ADMIN — DEXAMETHASONE SODIUM PHOSPHATE: 10 INJECTION, SOLUTION INTRAMUSCULAR; INTRAVENOUS at 08:49

## 2023-10-09 NOTE — PROGRESS NOTES
Confirmed with pt that transportation is needed for her scheduled PET CT 10/12/2023.  E mail sent to [place] to confirm transportation is set up for this appt

## 2023-10-09 NOTE — PATIENT INSTRUCTIONS
Nutrition Rx & Recommendations:  Diet: High Calorie, High Protein (for high calorie foods see pages 52-53, and for high protein foods see pages 49-51 in your Eating Hints book)  Nutrition Supplements: Continue ensure complete daily. May use homemade shakes/smoothies as desired. If using a pre-made shake/bar, choose ones with >300 calories and >10 grams protein (ex. Ensure Complete, Ensure Enlive, Ensure Plus, Boost Plus, Boost Very High Calorie, etc.). Small, frequent meals/snacks may be easier to tolerate than 3 large daily meals. Aim for 5-6 small meals per day. Include protein at all meals/snacks. Include a variety of foods (as tolerated/allowed by diet). Stay hydrated by sipping fluids of choice/tolerance throughout the day. Liquid nutrition may be better tolerated than solids at times. Alter food choices and eating patterns to accommodate changing needs. Refer to Eating Hints book for other meal/snack ideas and symptom management. Follow proper oral care; Try baking soda/salt water rinse recipe (mix 3/4 tsp salt + 1 tsp baking soda + 1 qt water; rinse with plain water after using) in Eating Hints book (pg 18). Brush your teeth before/after meals & before bed. For lack of taste: Practice good oral care. Blend fresh fruits into shakes, ice cream or yogurt. Eat frozen fruits: grapes, chopped cantaloupe, watermelon. Select fresh vegetables (may be more appealing than canned/frozen). Add extra flavor to foods: experiment with spices, salt & sweeteners, extra oil/butter; marinate meats (see pages 29-30 in your Eating Hints book).   For appetite loss: try powdered or liquid nutrition supplements; eating by the clock every 2-3 hours; set a timer to remind yourself to eat, keep snacks nearby; add extra protein & calories to your diet; drink liquids in between meals, choose liquids that add calories; eat a bedtime snack; eat soft, cool or frozen foods; eat a larger meal when you feel well & rested; only sip small amounts of liquids during meals (see pages 10-12 in your Eating Hints book). For weight loss: monitor your weight at home at least 2x/week, record your weight, start by adding 250-500 extra calories per day, eat 5-6 small scheduled meals every 2-3 hrs, choose foods that are high in protein and calories (see pages 49-53 in your Eating Hints book), drink liquids with calories for example: milkshakes/smoothies/juice/soup/whole milk/chocolate milk, cook with protein fortified milk (see recipe on page 36 in your Eating Hints book), consider ready-to-drink oral nutrition supplements such as Ensure Plus, Ensure Enlive, Boost Plus, or Boost Very High Calorie, avoid "diet" and "light" foods when possible, avoid drinking too much with meals, contact your dietitian with any continued weight loss over the course of 1 week. For more info see pages 35-37 in your Eating Hints book. For constipation: drink plenty of fluids (>64 oz/day); drink hot liquids; eat high-fiber foods as tolerated (whole grains, beans, peas, nuts, seeds, fruits, vegetables, etc); increase physical activity as tolerated. Avoid increasing fiber intake too quickly, add fiber into your diet slowly; keep a record of your bowel movements (see pages 13-14 in you Eating Hints book). For dry mouth: sip water throughout the day; try very sweet drinks; chew gum or suck on hard candy, popsicles, & ice chips; eat easy to swallow foods (such as pureed cooked foods or soups); moisten food with sauce/gravy/salad dressing; do not drink beer, wine, or any type of alcohol; keep lips moist with lip balm; avoid tobacco products & second-hand smoke; try [place]ot[place] as needed (see pages 17-18 in your Eating Hints book). Follow proper oral care; Try baking soda/salt water rinse recipe (mix 3/4 tsp salt + 1 tsp baking soda + 1 qt water; rinse with pain water after using) in Eating Hints book (pg 18). Brush your teeth before/after meals & before bed.   Weigh yourself regularly. If you notice weight loss, make an effort to increase your daily food/calorie intake. If you continue to notice loss after these efforts, reach out to your dietitian to establish a plan to stabilize weight. Consider trying "Apple/Prune Sauce" recipe on page 14 of your Eating Hints booklet. Be sure to drink 8 oz of water after taking 1-2 tbsp of the mixture before bedtime. [place] with the [employer] - add extra Fat, Acidity (as long as you do not have mouth or throat pain), Salt, and Sweetness to foods to help improve flavor.   Try using greek yogurt instead of regular  Snack ideas:  [place] yogurt  Cottage cheese and fruit  Nuts or nut butter  Soup or bone broth  Egg chicken or tuna salad   Oral nutrition supplement    Follow Up Plan: 11/6/23 during infusion  Recommend Referral to Other Providers: none at this time

## 2023-10-09 NOTE — TELEPHONE ENCOUNTER
Spoke with Jamie Alfaro, infusion RN regarding patient's dizziness. Patient reported that her dizziness is still there and it "comes and goes" however, she is reporting improvement today thus far. Patient is scheduled for hydration again on Wednesday on her disconnect day.

## 2023-10-09 NOTE — PROGRESS NOTES
Pt tolerated treatment today without incident. Pt connected BRANDIE and instructions given to return to infusion center on Wed. 10/11/23 for BRANDIE disconnect and hydration.   Pt was provided with AVS.

## 2023-10-09 NOTE — PROGRESS NOTES
Outpatient Oncology Nutrition Consultation   Type of Consult: Follow Up  Care Location: [facility] [place]    Reason for referral: Received notification by [facility] RN, Warden Mccann, on 8/1/23 that pt has triggered for oncology nutrition care (reason for referral: HNC dx and Malnutrition Screening Tool (MST) Triggers: scored a 0 indicating No recent wt loss and is not eating poorly due to a decreased appetite. (Date of MST: 8/1/23)). 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   - grade 2  - %  - NE 95%  - HER2 negative     12/20/2018 Surgery    Right partial mastectomy and SLN biopsy:  Infiltrating ductal carcinoma, Grade 2/3, felt to be between 2.0 cm and 2.5 cm in greatest dimension  - No invasive tumor is seen at inked margins, but there is a focus of DCIS seen within approximately 1mm of the inked medial margin  - no LVI  - no LCIS  - 0/2 LN's  at least Stage IIA - pT2, pN0, cM0, G2.    - Dr Estrella Marin     12/20/2018 -  Cancer Staged    Stage IIA - pT2, pN0, cM0, G2.       1/4/2019 Genomic Testing    Oncotype DX score: 13 2/1/2019 -  Hormone Therapy    anastrozole 1 mg daily as adjuvant endocrine therapy    - Dr Shakira Gardner       2/14/2019 - 4/3/2019 Radiation    Course: C1    Plan ID Energy Fractions Dose per Fraction (cGy) Dose Correction (cGy) Total Dose Delivered (cGy) Elapsed Days   R Breast 10X/6X 25 / 25 200 0 5,000 40   R BRST BOOST 16E 5 / 5 200 0 1,000 7      Treatment dates:  C1: 2/14/2019 - 4/3/2019       Metastatic colon cancer to liver (HCC)   7/6/2023 Genetic Testing    CARIS Results:   • KRAS Pathogenic Variant Exon 2  p.G12D : lack of benefit of cetuximab, panitumumab Level 2   • No other actionable mutation; MSI stable; TMB low   • MiFOLOXAi Results: "Decreased Benefit of FOLFOX + Bevacizumab in first line metastatic CRC. This patient may achieve improved results by receiving an alternative to FOLFOX, such as FOLFIRI, as their initial regimen. As an adjustment to frontline FOLFOXIRI following toxicity: This patient may achieve improved results by removing the oxaliplatin portion of their regimen".          7/11/2023 Initial Diagnosis    Metastatic colon cancer to liver ([address])     7/13/2023 -  Cancer Staged    Staging form: Colon and Rectum, AJCC 8th Edition  - Clinical stage from 7/13/2023: cT3, cN0, pM1 - Signed by Carly Ya DO on 9/28/2023  Total positive nodes: 0       7/31/2023 -  Chemotherapy    alteplase (CATHFLO), 2 mg, Intracatheter, Every 1 Minute as needed, 6 of 12 cycles  fluorouracil (ADRUCIL), 895 mg, Intravenous, Once, 6 of 12 cycles  Administration: 900 mg (7/31/2023), 900 mg (8/14/2023), 900 mg (8/28/2023), 900 mg (9/11/2023), 900 mg (9/25/2023), 900 mg (10/9/2023)  leucovorin calcium IVPB, 896 mg, Intravenous, Once, 6 of 12 cycles  Administration: 900 mg (7/31/2023), 900 mg (8/14/2023), 900 mg (8/28/2023), 900 mg (9/11/2023), 900 mg (9/25/2023), 900 mg (10/9/2023)  oxaliplatin (ELOXATIN) chemo infusion, 85 mg/m2 = 190.4 mg, Intravenous, Once, 6 of 12 cycles  Administration: 190.4 mg (7/31/2023), 190.4 mg (8/14/2023), 200 mg (8/28/2023), 200 mg (9/11/2023), 200 mg (9/25/2023), 200 mg (10/9/2023)  fluorouracil (ADRUCIL) ambulatory infusion Soln, 1,200 mg/m2/day = 5,375 mg, Intravenous, Over 46 hours, 6 of 12 cycles     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[address])   7/27/2023 Initial Diagnosis    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[address])     7/27/2023 -  Cancer Staged    Staging form: Pharynx - Oropharynx, AJCC 8th Edition  - Clinical stage from 7/27/2023: Stage III (cT1, cN3, cM0, p16+) - Signed by Barry Ewing MD on 7/27/2023  Stage prefix: Initial diagnosis         Past Medical & Surgical Hx:   Patient Active Problem List   Diagnosis   • Primary hypertension   • Vasomotor rhinitis   • Mixed hyperlipidemia   • Screen for colon cancer   • Malignant neoplasm of right female breast ([address])   • Vitamin D deficiency   • Encounter for screening for HIV   • Prediabetes   • Cervical lymphadenopathy   • Follow-up examination   • Right thyroid nodule   • GERD (gastroesophageal reflux disease)   • Stroke ([address])   • Obesity   • Metastatic colon cancer to liver [facility])   • 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HCC)   • Poor appetite   • Anemia   • Body mass index (BMI) 40.0-44.9, adult (HCC)   • Dehydration   • Drug-induced constipation     Past Medical History:   Diagnosis Date   • Anemia    • Arthritis    • Breast cancer ([address]) 12/17/2018   • Cancer ([address])     right breast, colon, liver, right tonsil   • GERD (gastroesophageal reflux disease)    • Hyperlipidemia    • Hypertension    • Obesity    • Stroke [facility])     TIA    • Transaminitis 09/22/2021     Past Surgical History:   Procedure Laterality Date   • BREAST BIOPSY Right 11/23/2018    us guided bx cancer   • BREAST SURGERY     • ESOPHAGOSCOPY N/A 7/20/2023    Procedure: ESOPHAGOSCOPY;  Surgeon: Su Hirsch MD;  [facility];  Service: ENT   • IR BIOPSY LIVER MASS  6/27/2023   • IR PORT PLACEMENT  7/28/2023   •  [address] INCL FLUOR GDNCE DX W/CELL WASHG SPX N/A 7/20/2023    Procedure: BRONCHOSCOPY;  Surgeon: Julianne Henderson MD;  [facility];  Service: ENT   • CO LARYNGOSCOPY W/BIOPSY MICROSCOPE/TELESCOPE N/A 7/20/2023    Procedure: MICRODIRECT LARYNGOSCOPY WITH BIOPSY;  Surgeon: Julianne Henderson MD;  [facility];  Service: ENT   • CO MASTECTOMY PARTIAL W/AXILLARY LYMPHADENECTOMY Right 12/20/2018    Procedure: ULTRASOUND LOCALIZED PARTIAL MASTECTOMY W/SENTINEL NODE BIOPSY POSS AXILLARY DISSECTION;  Surgeon: Bernadette Kumar MD;  Location: [address] [place] [address] MAIN OR;  Service: General   • TUBAL LIGATION     • US GUIDED BREAST BIOPSY RIGHT COMPLETE Right 11/23/2018   • US GUIDED INJECTION FOR RESEARCH STUDY  12/20/2018   • US GUIDED INJECTION FOR RESEARCH STUDY  12/7/2018   • US GUIDED INJECTION FOR RESEARCH STUDY  5/3/2019   • US GUIDED LYMPH NODE BIOPSY RIGHT  5/26/2023       Review of Medications:   Vitamins, Supplements and Herbals: No, pt denies taking supplements    Current Outpatient Medications:   •  anastrozole (ARIMIDEX) 1 mg tablet, Take 1 tablet (1 mg total) by mouth daily, Disp: 90 tablet, Rfl: 3  •  atorvastatin (LIPITOR) 40 mg tablet, Take 1 tablet (40 mg total) by mouth daily at bedtime, Disp: 30 tablet, Rfl: 2  •  Cyanocobalamin (B-12 PO), Take by mouth, Disp: , Rfl:   •  docusate sodium (COLACE) 50 mg capsule, Take 1 capsule (50 mg total) by mouth 2 (two) times a day, Disp: 90 capsule, Rfl: 1  •  fluorouracil 5,375 mg in CADD/Elastomeric Infusion Device, Infuse 5,375 mg (1,200 mg/m2/day x 2.24 m2) into a catheter in a vein via infusion device over 46 hours for 2 days  Infusion planned for October 9, 2023., Disp: 1 each, Rfl: 0  •  FOLIC ACID PO, Take by mouth, Disp: , Rfl:   •  lidocaine-prilocaine (EMLA) cream, Apply topically as needed for mild pain, Disp: 30 g, Rfl: 3  •  losartan (COZAAR) 50 mg tablet, Take 1 tablet (50 mg total) by mouth daily, Disp: 90 tablet, Rfl: 1  •  Magnesium Oxide 400 MG CAPS, Take 1 tablet (400 mg total) by mouth 2 (two) times a day, Disp: 14 capsule, Rfl: 0  •  omeprazole (PriLOSEC) 20 mg delayed release capsule, Take 1 capsule (20 mg total) by mouth daily, Disp: 30 capsule, Rfl: 5  •  ondansetron (ZOFRAN) 8 mg tablet, Take 1 tablet (8 mg total) by mouth every 8 (eight) hours as needed for nausea or vomiting, Disp: 30 tablet, Rfl: 3  •  polyethylene glycol (GOLYTELY) 4000 mL solution, Take 4,000 mL by mouth once for 1 dose, Disp: 4000 mL, Rfl: 0  •  potassium chloride (K-DUR,KLOR-CON) 20 mEq tablet, Take 1 tablet (20 mEq total) by mouth 2 (two) times a day for 7 days, Disp: 14 tablet, Rfl: 0  •  prochlorperazine (COMPAZINE) 10 mg tablet, Take 1 tablet (10 mg total) by mouth every 6 (six) hours as needed for nausea or vomiting, Disp: 30 tablet, Rfl: 3  •  VITAMIN D PO, Take by mouth, Disp: , Rfl:   No current facility-administered medications for this visit.     Facility-Administered Medications Ordered in Other Visits:   •  alteplase (CATHFLO) injection 2 mg, 2 mg, Intracatheter, Q1MIN PRN, Ace Pacini, DO  •  sodium chloride 0.9 % infusion, 20 mL/hr, Intravenous, Once PRN, Ace Pacini, DO    Most Recent Lab Results:   Lab Results   Component Value Date    WBC 4.28 (L) 10/06/2023    NEUTROABS 2.14 10/06/2023    IRON 28 (L) 09/05/2023    TIBC 319 09/05/2023    FERRITIN 102 09/05/2023    CHOLESTEROL 222 (H) 06/13/2023    TRIG 140 06/13/2023    HDL 44 (L) 06/13/2023    LDLCALC 150 (H) 06/13/2023    ALT 57 (H) 10/06/2023    AST 65 (H) 10/06/2023    ALB 3.5 10/06/2023    SODIUM 139 10/06/2023    SODIUM 138 09/22/2023    K 3.4 (L) 10/06/2023    K 4.0 09/22/2023     10/06/2023    BUN 14 10/06/2023    BUN 13 09/22/2023    CREATININE 0.67 10/06/2023    CREATININE 0.72 09/22/2023    EGFR 91 10/06/2023    POCGLU 106 06/19/2023    GLUF 110 (H) 08/19/2023    GLUF 113 (H) 07/24/2023    GLUC 96 10/06/2023    HGBA1C 5.9 (H) 06/13/2023    HGBA1C 6.1 (H) 07/09/2022    HGBA1C 6.1 (H) 09/16/2021    CALCIUM 8.9 10/06/2023    FOLATE >20.0 (H) 05/23/2019    MG 1.8 (L) 09/11/2023       Anthropometric Measurements:   Height: 66"  Ht Readings from Last 1 Encounters:   10/09/23 5' 5.98" (1.676 m)     Wt Readings from Last 20 Encounters:   10/09/23 116 kg (255 lb 11.7 oz)   10/06/23 115 kg (253 lb)   09/25/23 116 kg (256 lb 13.4 oz)   09/22/23 115 kg (253 lb 3.2 oz)   09/11/23 119 kg (261 lb 7.5 oz)   09/08/23 117 kg (258 lb 9.6 oz)   08/28/23 118 kg (261 lb 0.4 oz)   08/23/23 115 kg (254 lb)   08/22/23 116 kg (255 lb)   08/14/23 119 kg (262 lb 5.6 oz)   08/11/23 119 kg (262 lb)   08/03/23 118 kg (260 lb 12.8 oz)   08/01/23 121 kg (267 lb)   07/31/23 120 kg (264 lb 8.8 oz)   07/28/23 119 kg (263 lb)   07/11/23 119 kg (263 lb)   07/06/23 120 kg (264 lb)   07/05/23 120 kg (264 lb)   06/30/23 120 kg (264 lb 3.2 oz)   06/27/23 122 kg (268 lb)       Weight History:   • Usual Weight: 275#  • Varian: (2/22/19) 264.6#, (4/2/19) 263.4  • Home Scale: (somtime in july) 276#    Oncology Nutrition-Anthropometrics    Flowsheet Row Nutrition from 10/9/2023 in [address] [facility] Nutrition from 9/11/2023 in [facility]   Patient age (years): 80 years 80 years   Patient (female) height (in): 77 in 77 in   Current Weight to be used for anthropometric calculations (lbs) 255.7 lbs 261.5 lbs   Current Weight to be used for anthropometric calculations (kg) 116.2 kg 118.9 kg   BMI: 41.3 42.2   IBW female: 130 lbs 130 lbs   IBW (kg) female: 59.1 kg 59.1 kg   IBW % (female) 196.7 % 201.2 %   Adjusted BW (female): 161.4 lbs 162.9 lbs   Adjusted BW kg (female): 73.4 kg 74 kg   % weight change after 1 month: -1.1 % -0.2 %   Weight change after 1 month (lbs) -2.9 lbs -0.5 lbs   % weight change after 3 months: -2.8 % -2.6 %   Weight change after 3 months (lbs) -7.3 lbs -7 lbs   % weight change after 6 months: -- -4.9 %   Weight change after 6 months (lbs) -- -13.5 lbs Nutrition-Focused Physical Findings: none observed    Food/Nutrition-Related History & Client/Social History:    Current Nutrition Impact Symptoms:  [] Nausea  [x] Reduced Appetite -fluctuates, improving [] Acid Reflux    [] Vomiting  [] Unintended Wt Loss -stable now [] Malabsorption    [] Diarrhea  [] Unintended Wt Gain  [] Dumping Syndrome    [x] Constipation -colace, had BM this morning  [] Thick Mucous/Secretions  [] Abdominal Pain    [x] Dysgeusia (Altered Taste) -starting to improve [x] Xerostomia (Dry Mouth)  [] Gas    [] Dysosmia (Altered Smell)  [] Thrush  [] Difficulty Chewing    [] Oral Mucositis (Sore Mouth)  [x] Fatigue  [] Hyperglycemia   Lab Results   Component Value Date    HGBA1C 5.9 (H) 2023      [] Odynophagia  [] Esophagitis  [] Other:    [] Dysphagia -swallow study 8/15/23- regular diet/thin liquids [] Early Satiety  [] No Problems Eating      Food Allergies & Intolerances: no    Current Diet: Regular Diet, No Restrictions  Current Nutrition Intake: Unchanged from last visit  Appetite: Good, Fair , Fluctuating  Nutrition Route: PO  Oral Care: brushes daily  Activity level: ADLs, very fatigued so activity is limited     24 Hr Diet Recall:   Breakfast: sip of Ensure  Snack: nothing  Lunch: tuna salad on sandwich, Ensure  Snack: ritz crackers and chocolate  Dinner: BBQ chicken and mac and cheese and spinach  Snack: nothing    Beverages: water (16.9oz x 7 yesterday, previously was only drinking about 2 bottles), iced tea (8oz x3-4), soda (12oz x1)  Supplements:   • Ensure Complete (10 oz, 350 kcal, 30 g pro) at least one daily      Oncology Nutrition-Estimated Needs    Flowsheet Row Nutrition from 2023 in [address] Oncology Dietitian Services   Weight type used Adjusted weight   Weight in kilograms (kg) used for estimated needs 74.1 kg   Energy needs formula:  30-35 kcal/kg   Energy needs based on 30 kcal/k kcal   Energy needs based on 35 kcal/k kcal Protein needs formula: 1.2-1.5 g/kg   Protein needs based on 1.2 g/k g   Protein needs based on 1.5 g/kg 111 g   Fluid needs formula: 30-35 mL/kg   Fluid needs based on 30 mL/kg 2223 mL   Fluid needs in ounces 75 oz   Fluid needs based on 35 mL/kg 2594 mL   Fluid needs in ounces 88 oz           Discussion & Intervention:   Felipe Dolan was evaluated today for an RD follow up regarding HNC. Felipe Dolan is currently undergoing tx for HNC. Met with Maira today during her infusion. She reports appetite is improving, but still fluctuates. She still experiences dysgeusia but does notice some improvement. She also has been experiencing constipation. She started on colace and did have a BM today. We reviewed MNT for constipation. She is maintaining her weight. She is drinking Ensure complete once/day. She has been trying to increase her fluid intake. Reviewed 24 hour recall, which revealed an adequate po intake, and discussed ways to optimize nutrient intake. Also reviewed the importance of wt management throughout the tx process and the role of a high kcal/ high protein diet in managing wt and overall health.   Based on today's assessment, discussion included: MNT for: dysgeusia, xerostomia, decreased appetite, hydration, constipation, how to modify foods for anticipated nutrition impact symptoms pt may experience during CA tx, practicing proper oral care, a high kcal/protein diet & food choices to include at all meals & snacks (Examples of high kcal foods: cheese, full-fat dairy products, nut butter, plant-based fats, coconut oil/milk, avocado, butter, cream soup, etc. Examples of high protein foods: eggs, chicken, fish, beans/legumes, nuts/nut butters, bone broth, etc.) , fortifying foods for added kcal and protein (examples include: adding cheese to foods such as eggs, mashed potatoes, casseroles, etc.; Making oatmeal with whole milk rather than water; Making fortified mashed potatoes with cream, butter, dry milk powder, plain [place] yogurt, and cheese.), adequate hydration & fluid choices, eating smaller more frequent meals every 2-3 hours (5-6 small meals/day) and continuing oral nutrition supplements. Moving forward, Melani Collins will continue current nutrition plan of care and practice MNT for nutrition impact sx management   Materials Provided: Ensure samples and Ensure Coupons  All questions and concerns addressed during today’s visit. Melani Collins has RD contact information. Nutrition Diagnosis:   • Increased Nutrient Needs (kcal & pro) related to increased demand for nutrients and disease state as evidenced by cancer dx and pt undergoing tx for cancer. Monitoring & Evaluation:   Goals:  · weight maintenance/stabilization  · adequate nutrition impact symptom management  · pt to meet >/=75% estimated nutrition needs daily    · Progress Towards Goals: Progressing    Nutrition Rx & Recommendations:  · Diet: High Calorie, High Protein (for high calorie foods see pages 52-53, and for high protein foods see pages 49-51 in your Eating Hints book)  · Nutrition Supplements: Continue ensure complete daily. May use homemade shakes/smoothies as desired. If using a pre-made shake/bar, choose ones with >300 calories and >10 grams protein (ex. Ensure Complete, Ensure Enlive, Ensure Plus, Boost Plus, Boost Very High Calorie, etc.). · Small, frequent meals/snacks may be easier to tolerate than 3 large daily meals. Aim for 5-6 small meals per day. · Include protein at all meals/snacks. · Include a variety of foods (as tolerated/allowed by diet). · Stay hydrated by sipping fluids of choice/tolerance throughout the day. · Liquid nutrition may be better tolerated than solids at times. · Alter food choices and eating patterns to accommodate changing needs. · Refer to Eating Hints book for other meal/snack ideas and symptom management.   · Follow proper oral care; Try baking soda/salt water rinse recipe (mix 3/4 tsp salt + 1 tsp baking soda + 1 qt water; rinse with plain water after using) in Eating Hints book (pg 18). Brush your teeth before/after meals & before bed. · For lack of taste: Practice good oral care. Blend fresh fruits into shakes, ice cream or yogurt. Eat frozen fruits: grapes, chopped cantaloupe, watermelon. Select fresh vegetables (may be more appealing than canned/frozen). Add extra flavor to foods: experiment with spices, salt & sweeteners, extra oil/butter; marinate meats (see pages 29-30 in your Eating Hints book). · For appetite loss: try powdered or liquid nutrition supplements; eating by the clock every 2-3 hours; set a timer to remind yourself to eat, keep snacks nearby; add extra protein & calories to your diet; drink liquids in between meals, choose liquids that add calories; eat a bedtime snack; eat soft, cool or frozen foods; eat a larger meal when you feel well & rested; only sip small amounts of liquids during meals (see pages 10-12 in your Eating Hints book). · For weight loss: monitor your weight at home at least 2x/week, record your weight, start by adding 250-500 extra calories per day, eat 5-6 small scheduled meals every 2-3 hrs, choose foods that are high in protein and calories (see pages 49-53 in your Eating Hints book), drink liquids with calories for example: milkshakes/smoothies/juice/soup/whole milk/chocolate milk, cook with protein fortified milk (see recipe on page 36 in your Eating Hints book), consider ready-to-drink oral nutrition supplements such as Ensure Plus, Ensure Enlive, Boost Plus, or Boost Very High Calorie, avoid "diet" and "light" foods when possible, avoid drinking too much with meals, contact your dietitian with any continued weight loss over the course of 1 week. For more info see pages 35-37 in your Eating Hints book.   · For constipation: drink plenty of fluids (>64 oz/day); drink hot liquids; eat high-fiber foods as tolerated (whole grains, beans, peas, nuts, seeds, fruits, vegetables, etc); increase physical activity as tolerated. Avoid increasing fiber intake too quickly, add fiber into your diet slowly; keep a record of your bowel movements (see pages 13-14 in you Eating Hints book). · For dry mouth: sip water throughout the day; try very sweet drinks; chew gum or suck on hard candy, popsicles, & ice chips; eat easy to swallow foods (such as pureed cooked foods or soups); moisten food with sauce/gravy/salad dressing; do not drink beer, wine, or any type of alcohol; keep lips moist with lip balm; avoid tobacco products & second-hand smoke; try Biotene as needed (see pages 17-18 in your Eating Hints book). Follow proper oral care; Try baking soda/salt water rinse recipe (mix 3/4 tsp salt + 1 tsp baking soda + 1 qt water; rinse with pain water after using) in Eating Hints book (pg 18). Brush your teeth before/after meals & before bed. · Weigh yourself regularly. If you notice weight loss, make an effort to increase your daily food/calorie intake. If you continue to notice loss after these efforts, reach out to your dietitian to establish a plan to stabilize weight. · Consider trying "Apple/Prune Sauce" recipe on page 14 of your Eating Hints booklet. Be sure to drink 8 oz of water after taking 1-2 tbsp of the mixture before bedtime. [place] with the [employer] Concept - add extra Fat, Acidity (as long as you do not have mouth or throat pain), Salt, and Sweetness to foods to help improve flavor.   · Try using greek yogurt instead of regular  · Snack ideas:  · [place] yogurt  · [address] and fruit  · Nuts or nut butter  · Soup or bone broth  · Egg chicken or tuna salad   · Oral nutrition supplement    Follow Up Plan: 11/6/23 during infusion  Recommend Referral to Other Providers: none at this time

## 2023-10-11 ENCOUNTER — HOSPITAL ENCOUNTER (OUTPATIENT)
Dept: INFUSION CENTER | Facility: CLINIC | Age: 68
Discharge: HOME | End: 2023-10-11
Payer: MEDICARE

## 2023-10-11 DIAGNOSIS — C18.9 METASTATIC COLON CANCER TO LIVER (HCC): Primary | ICD-10-CM

## 2023-10-11 DIAGNOSIS — C78.7 METASTATIC COLON CANCER TO LIVER (HCC): Primary | ICD-10-CM

## 2023-10-11 PROCEDURE — 96360 HYDRATION IV INFUSION INIT: CPT

## 2023-10-11 RX ADMIN — SODIUM CHLORIDE 1000 ML: 0.9 INJECTION, SOLUTION INTRAVENOUS at 09:39

## 2023-10-11 NOTE — ASSESSMENT & PLAN NOTE
BP today: 142/90; Not at goal  Missed med doses  Home med: Losartan 50 mg daily    - Refilled meds  - Continue med as directed  - Discussed cutting back on salt and moving more as tolerated.

## 2023-10-11 NOTE — PROGRESS NOTES
Patient arrived to appointment without concerns. BRANDIE pump completed and disconnected from patient. 1L NSS infused without incident.  Denies need for AVS.

## 2023-10-11 NOTE — PROGRESS NOTES
Name: Obed Wasserman      : 1955      MRN: [id number]  Encounter Provider: Antolin Kohli MD  Encounter Date: 10/9/2023   Encounter department: [address] [facility]     1. Primary hypertension  Assessment & Plan:  BP today: 142/90; Not at goal  Missed med doses  Home med: Losartan 50 mg daily    - Refilled meds  - Continue med as directed  - Discussed cutting back on salt and moving more as tolerated. Orders:  -     losartan (COZAAR) 50 mg tablet; Take 1 tablet (50 mg total) by mouth daily    2. Mixed hyperlipidemia  -     atorvastatin (LIPITOR) 40 mg tablet; Take 1 tablet (40 mg total) by mouth daily at bedtime         Subjective      Obed Wasserman is a 80 y.o. female with pmhx of peritonsillar squamous carcinoma, thyroid metastatic colon cancer to liver, breast cancer,and HTN presenting today to follow up for her high blood pressure. She ran out of her medications for about a week now. She is currently undergoing chemotherapy for her metastatic colon cancer; she has completed 6 cycles. Patient is accompanied by daughter. Review of Systems   Constitutional: Positive for fatigue. Negative for fever. Cardiovascular: Negative for chest pain and palpitations. Musculoskeletal: Negative for neck pain. Current Outpatient Medications on File Prior to Visit   Medication Sig   • anastrozole (ARIMIDEX) 1 mg tablet Take 1 tablet (1 mg total) by mouth daily   • fluorouracil 5,375 mg in CADD/Elastomeric Infusion Device Infuse 5,375 mg (1,200 mg/m2/day x 2.24 m2) into a catheter in a vein via infusion device over 46 hours for 2 days  Infusion planned for 2023.    • lidocaine-prilocaine (EMLA) cream Apply topically as needed for mild pain   • omeprazole (PriLOSEC) 20 mg delayed release capsule Take 1 capsule (20 mg total) by mouth daily   • ondansetron (ZOFRAN) 8 mg tablet Take 1 tablet (8 mg total) by mouth every 8 (eight) hours as needed for nausea or vomiting   • prochlorperazine (COMPAZINE) 10 mg tablet Take 1 tablet (10 mg total) by mouth every 6 (six) hours as needed for nausea or vomiting   • VITAMIN D PO Take by mouth   • Cyanocobalamin (B-12 PO) Take by mouth   • docusate sodium (COLACE) 50 mg capsule Take 1 capsule (50 mg total) by mouth 2 (two) times a day   • FOLIC ACID PO Take by mouth   • Magnesium Oxide 400 MG CAPS Take 1 tablet (400 mg total) by mouth 2 (two) times a day       Objective     /90 (BP Location: Left arm, Patient Position: Sitting, Cuff Size: Large)   Pulse 103   Temp 98 °F (36.7 °C) (Temporal)   Resp 18   Ht 5' 5" (1.651 m)   Wt 117 kg (257 lb)   SpO2 98%   BMI 42.77 kg/m²     Physical Exam  Vitals reviewed. Constitutional:       General: She is not in acute distress. Appearance: She is obese. She is not ill-appearing, toxic-appearing or diaphoretic. HENT:      Right Ear: Tympanic membrane and ear canal normal.      Left Ear: Tympanic membrane and ear canal normal.      Mouth/Throat:      Pharynx: No oropharyngeal exudate or posterior oropharyngeal erythema. Eyes:      General: No scleral icterus. Cardiovascular:      Rate and Rhythm: Normal rate and regular rhythm. Pulses: Normal pulses. Heart sounds: Normal heart sounds. Pulmonary:      Effort: Pulmonary effort is normal.      Breath sounds: Normal breath sounds. No wheezing. Abdominal:      General: There is no distension. Palpations: Abdomen is soft. Musculoskeletal:      Right lower leg: No edema. Left lower leg: No edema. Skin:     General: Skin is warm. Comments: Port on right upper chest   Neurological:      General: No focal deficit present. Mental Status: She is alert and oriented to person, place, and time.    Psychiatric:         Behavior: Behavior normal.       Georgina Rivera MD

## 2023-10-12 ENCOUNTER — TELEPHONE (OUTPATIENT)
Age: 68
End: 2023-10-12

## 2023-10-12 ENCOUNTER — HOSPITAL ENCOUNTER (OUTPATIENT)
Dept: RADIOLOGY | Age: 68
Discharge: HOME | End: 2023-10-12
Payer: MEDICARE

## 2023-10-12 DIAGNOSIS — C09.9 RIGHT TONSILLAR SQUAMOUS CELL CARCINOMA (HCC): ICD-10-CM

## 2023-10-12 DIAGNOSIS — C50.411 MALIGNANT NEOPLASM OF UPPER-OUTER QUADRANT OF RIGHT BREAST IN FEMALE, ESTROGEN RECEPTOR POSITIVE: ICD-10-CM

## 2023-10-12 DIAGNOSIS — C18.9 METASTATIC COLON CANCER TO LIVER (HCC): ICD-10-CM

## 2023-10-12 DIAGNOSIS — C78.7 METASTATIC COLON CANCER TO LIVER (HCC): ICD-10-CM

## 2023-10-12 DIAGNOSIS — Z17.0 MALIGNANT NEOPLASM OF UPPER-OUTER QUADRANT OF RIGHT BREAST IN FEMALE, ESTROGEN RECEPTOR POSITIVE: ICD-10-CM

## 2023-10-12 LAB — GLUCOSE SERPL-MCNC: 91 MG/DL (ref 65–140)

## 2023-10-12 PROCEDURE — 78815 PET IMAGE W/CT SKULL-THIGH: CPT

## 2023-10-12 PROCEDURE — G1004 CDSM NDSC: HCPCS

## 2023-10-12 PROCEDURE — A9552 F18 FDG: HCPCS

## 2023-10-12 PROCEDURE — 82948 REAGENT STRIP/BLOOD GLUCOSE: CPT

## 2023-10-12 NOTE — TELEPHONE ENCOUNTER
Phone call to [employer] spoke to Aayush regarding having PET CT results read for upcoming appointment 10/17/23.

## 2023-10-13 ENCOUNTER — TELEPHONE (OUTPATIENT)
Dept: HEMATOLOGY ONCOLOGY | Facility: CLINIC | Age: 68
End: 2023-10-13

## 2023-10-13 NOTE — TELEPHONE ENCOUNTER
Left VM for patient checking in to see how she was feeling after the IV hydration on Wednesday and to see if she is still experiencing dizziness. Left direct number for call back.

## 2023-10-16 ENCOUNTER — TELEPHONE (OUTPATIENT)
Age: 68
End: 2023-10-16

## 2023-10-17 ENCOUNTER — TELEPHONE (OUTPATIENT)
Dept: HEMATOLOGY ONCOLOGY | Facility: CLINIC | Age: 68
End: 2023-10-17

## 2023-10-17 ENCOUNTER — OFFICE VISIT (OUTPATIENT)
Dept: HEMATOLOGY ONCOLOGY | Facility: CLINIC | Age: 68
End: 2023-10-17
Payer: MEDICARE

## 2023-10-17 ENCOUNTER — DOCUMENTATION (OUTPATIENT)
Dept: HEMATOLOGY ONCOLOGY | Facility: CLINIC | Age: 68
End: 2023-10-17

## 2023-10-17 VITALS
OXYGEN SATURATION: 96 % | WEIGHT: 251.2 LBS | SYSTOLIC BLOOD PRESSURE: 138 MMHG | TEMPERATURE: 97 F | BODY MASS INDEX: 41.85 KG/M2 | DIASTOLIC BLOOD PRESSURE: 88 MMHG | HEIGHT: 65 IN | HEART RATE: 100 BPM

## 2023-10-17 DIAGNOSIS — R42 LIGHTHEADED: ICD-10-CM

## 2023-10-17 DIAGNOSIS — C18.9 METASTATIC COLON CANCER TO LIVER (HCC): Primary | ICD-10-CM

## 2023-10-17 DIAGNOSIS — C78.7 METASTATIC COLON CANCER TO LIVER (HCC): Primary | ICD-10-CM

## 2023-10-17 PROCEDURE — 99215 OFFICE O/P EST HI 40 MIN: CPT | Performed by: INTERNAL MEDICINE

## 2023-10-17 RX ORDER — SODIUM CHLORIDE 9 MG/ML
20 INJECTION, SOLUTION INTRAVENOUS ONCE AS NEEDED
OUTPATIENT
Start: 2023-10-23

## 2023-10-17 RX ORDER — FLUOROURACIL 50 MG/ML
900 INJECTION, SOLUTION INTRAVENOUS ONCE
OUTPATIENT
Start: 2023-10-23

## 2023-10-17 RX ORDER — DEXTROSE MONOHYDRATE 50 MG/ML
20 INJECTION, SOLUTION INTRAVENOUS ONCE
OUTPATIENT
Start: 2023-10-23

## 2023-10-17 NOTE — TELEPHONE ENCOUNTER
A call was placed to verify if [facility] transport was needed for her 10/27/23 2pm echo. Pt was asked to call the office with her answer.

## 2023-10-17 NOTE — PROGRESS NOTES
In-basket message received from Dr. Isaac Ng to add patient to  head and neck [address] [place] [address] Loop on 10/23/2023. Chart reviewed and prep completed.

## 2023-10-19 DIAGNOSIS — C18.9 METASTATIC COLON CANCER TO LIVER (HCC): Primary | ICD-10-CM

## 2023-10-19 DIAGNOSIS — C78.7 METASTATIC COLON CANCER TO LIVER (HCC): Primary | ICD-10-CM

## 2023-10-20 ENCOUNTER — HOSPITAL ENCOUNTER (OUTPATIENT)
Dept: INFUSION CENTER | Facility: CLINIC | Age: 68
End: 2023-10-20
Payer: MEDICARE

## 2023-10-20 DIAGNOSIS — C78.7 METASTATIC COLON CANCER TO LIVER (HCC): Primary | ICD-10-CM

## 2023-10-20 DIAGNOSIS — C18.9 METASTATIC COLON CANCER TO LIVER (HCC): Primary | ICD-10-CM

## 2023-10-20 LAB
ALBUMIN SERPL BCP-MCNC: 3.5 G/DL (ref 3.5–5)
ALP SERPL-CCNC: 95 U/L (ref 34–104)
ALT SERPL W P-5'-P-CCNC: 58 U/L (ref 7–52)
ANION GAP SERPL CALCULATED.3IONS-SCNC: 9 MMOL/L
AST SERPL W P-5'-P-CCNC: 73 U/L (ref 13–39)
BASOPHILS # BLD AUTO: 0.02 THOUSANDS/ÂΜL (ref 0–0.1)
BASOPHILS NFR BLD AUTO: 1 % (ref 0–1)
BILIRUB SERPL-MCNC: 0.44 MG/DL (ref 0.2–1)
BUN SERPL-MCNC: 9 MG/DL (ref 5–25)
CALCIUM SERPL-MCNC: 9.1 MG/DL (ref 8.4–10.2)
CEA SERPL-MCNC: 3.6 NG/ML (ref 0–3)
CHLORIDE SERPL-SCNC: 106 MMOL/L (ref 96–108)
CO2 SERPL-SCNC: 24 MMOL/L (ref 21–32)
CREAT SERPL-MCNC: 0.65 MG/DL (ref 0.6–1.3)
EOSINOPHIL # BLD AUTO: 0.05 THOUSAND/ÂΜL (ref 0–0.61)
EOSINOPHIL NFR BLD AUTO: 1 % (ref 0–6)
ERYTHROCYTE [DISTWIDTH] IN BLOOD BY AUTOMATED COUNT: 19 % (ref 11.6–15.1)
FERRITIN SERPL-MCNC: 139 NG/ML (ref 11–307)
GFR SERPL CREATININE-BSD FRML MDRD: 92 ML/MIN/1.73SQ M
GLUCOSE SERPL-MCNC: 111 MG/DL (ref 65–140)
HCT VFR BLD AUTO: 28.1 % (ref 34.8–46.1)
HGB BLD-MCNC: 8.8 G/DL (ref 11.5–15.4)
IMM GRANULOCYTES # BLD AUTO: 0.01 THOUSAND/UL (ref 0–0.2)
IMM GRANULOCYTES NFR BLD AUTO: 0 % (ref 0–2)
IRON SATN MFR SERPL: 8 % (ref 15–50)
IRON SERPL-MCNC: 27 UG/DL (ref 50–212)
LYMPHOCYTES # BLD AUTO: 1.47 THOUSANDS/ÂΜL (ref 0.6–4.47)
LYMPHOCYTES NFR BLD AUTO: 41 % (ref 14–44)
MCH RBC QN AUTO: 24.9 PG (ref 26.8–34.3)
MCHC RBC AUTO-ENTMCNC: 31.3 G/DL (ref 31.4–37.4)
MCV RBC AUTO: 80 FL (ref 82–98)
MONOCYTES # BLD AUTO: 0.4 THOUSAND/ÂΜL (ref 0.17–1.22)
MONOCYTES NFR BLD AUTO: 11 % (ref 4–12)
NEUTROPHILS # BLD AUTO: 1.66 THOUSANDS/ÂΜL (ref 1.85–7.62)
NEUTS SEG NFR BLD AUTO: 46 % (ref 43–75)
NRBC BLD AUTO-RTO: 1 /100 WBCS
PLATELET # BLD AUTO: 153 THOUSANDS/UL (ref 149–390)
PMV BLD AUTO: 10.5 FL (ref 8.9–12.7)
POTASSIUM SERPL-SCNC: 3.2 MMOL/L (ref 3.5–5.3)
PROT SERPL-MCNC: 7 G/DL (ref 6.4–8.4)
RBC # BLD AUTO: 3.53 MILLION/UL (ref 3.81–5.12)
SODIUM SERPL-SCNC: 139 MMOL/L (ref 135–147)
TIBC SERPL-MCNC: 327 UG/DL (ref 250–450)
UIBC SERPL-MCNC: 300 UG/DL (ref 155–355)
WBC # BLD AUTO: 3.61 THOUSAND/UL (ref 4.31–10.16)

## 2023-10-20 PROCEDURE — 82728 ASSAY OF FERRITIN: CPT | Performed by: INTERNAL MEDICINE

## 2023-10-20 PROCEDURE — 83540 ASSAY OF IRON: CPT | Performed by: INTERNAL MEDICINE

## 2023-10-20 PROCEDURE — 83550 IRON BINDING TEST: CPT | Performed by: INTERNAL MEDICINE

## 2023-10-20 PROCEDURE — 85025 COMPLETE CBC W/AUTO DIFF WBC: CPT | Performed by: INTERNAL MEDICINE

## 2023-10-20 PROCEDURE — 80053 COMPREHEN METABOLIC PANEL: CPT | Performed by: INTERNAL MEDICINE

## 2023-10-20 PROCEDURE — 82378 CARCINOEMBRYONIC ANTIGEN: CPT | Performed by: INTERNAL MEDICINE

## 2023-10-20 NOTE — PROGRESS NOTES
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   - grade 2  - %  - MT 95%  - HER2 negative     2018 Surgery    Right partial mastectomy and SLN biopsy:  Infiltrating ductal carcinoma, Grade 2/3, felt to be between 2.0 cm and 2.5 cm in greatest dimension  - No invasive tumor is seen at inked margins, but there is a focus of DCIS seen within approximately 1mm of the inked medial margin  - no LVI  - no LCIS  - 0/2 LN's  at least Stage IIA - pT2, pN0, cM0, G2.    - Dr Matt Raymond     2018 -  Cancer Staged    Stage IIA - pT2, pN0, cM0, G2.       2019 Genomic Testing    Oncotype DX score: 13     2019 -  Hormone Therapy    anastrozole 1 mg daily as adjuvant endocrine therapy    - Dr Jonathon Andujar       2019 - 4/3/2019 Radiation    Course: C1    Plan ID Energy Fractions Dose per Fraction (cGy) Dose Correction (cGy) Total Dose Delivered (cGy) Elapsed Days   R Breast 10X/6X 25 / 25 200 0 5,000 40   R BRST BOOST 16E 5 / 5 200 0 1,000 7      Treatment dates:  C1: 2019 - 4/3/2019       Metastatic colon cancer to liver ([address])   2023 Genetic Testing    CARIS Results:   KRAS Pathogenic Variant Exon 2  p.G12D : lack of benefit of cetuximab, panitumumab Level 2   No other actionable mutation; MSI stable; TMB low   MiFOLOXAi Results: "Decreased Benefit of FOLFOX + Bevacizumab in first line metastatic CRC. This patient may achieve improved results by receiving an alternative to FOLFOX, such as FOLFIRI, as their initial regimen. As an adjustment to frontline FOLFOXIRI following toxicity: This patient may achieve improved results by removing the oxaliplatin portion of their regimen".          7/11/2023 Initial Diagnosis    Metastatic colon cancer to liver ([address])     7/13/2023 -  Cancer Staged    Staging form: Colon and Rectum, AJCC 8th Edition  - Clinical stage from 7/13/2023: cT3, cN0, pM1 - Signed by Mandeep Garrett DO on 9/28/2023  Total positive nodes: 0       7/31/2023 -  Chemotherapy    alteplase (CATHFLO), 2 mg, Intracatheter, Every 1 Minute as needed, 6 of 12 cycles  fluorouracil (ADRUCIL), 895 mg, Intravenous, Once, 6 of 12 cycles  Administration: 900 mg (7/31/2023), 900 mg (8/14/2023), 900 mg (8/28/2023), 900 mg (9/11/2023), 900 mg (9/25/2023), 900 mg (10/9/2023)  leucovorin calcium IVPB, 896 mg, Intravenous, Once, 6 of 12 cycles  Administration: 900 mg (7/31/2023), 900 mg (8/14/2023), 900 mg (8/28/2023), 900 mg (9/11/2023), 900 mg (9/25/2023), 900 mg (10/9/2023)  oxaliplatin (ELOXATIN) chemo infusion, 85 mg/m2 = 190.4 mg, Intravenous, Once, 6 of 12 cycles  Administration: 190.4 mg (7/31/2023), 190.4 mg (8/14/2023), 200 mg (8/28/2023), 200 mg (9/11/2023), 200 mg (9/25/2023), 200 mg (10/9/2023)  fluorouracil (ADRUCIL) ambulatory infusion Soln, 1,200 mg/m2/day = 5,375 mg, Intravenous, Over 46 hours, 6 of 12 cycles     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[address])   7/27/2023 Initial Diagnosis    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[address])     7/27/2023 -  Cancer Staged    Staging form: Pharynx - Oropharynx, AJCC 8th Edition  - Clinical stage from 7/27/2023: Stage III (cT1, cN3, cM0, p16+) - Signed by Magui Wood MD on 7/27/2023  Stage prefix: Initial diagnosis           Interval History: Patient presents for follow up of her metastatic colon cancer and squamous cell carcinoma of the tonsil with bulky lymphadenopathy in the cervical spine. She had a CT scan prior to this visit that shows significant improvement in both the squamous cell carcinoma and the colon cancer. I can still palpate lymphadenopathy in her right neck but it is decreased by at least 80%. She has ongoing complaints of lightheadedness with standing. At times it she comes close to falling because of it. She has lost 2 pounds since last visit. She drinks about 3 bottles of water and 1 Ensure a day in addition to food. She has completed genetic testing and a CHEK2 VUS and a dicer 1 VUS has been found. She was having some upper respiratory symptoms over the weekend but that has overall improved on its own. 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 She has some mild nausea but this is controlled with home medications. She does not have diarrhea but does have intermittent constipation that it was relieved with over-the-counter medications. REVIEW OF SYSTEMS:  Please note that a 14-point review of systems was performed to include Constitutional, HEENT, Respiratory, CVS, GI, , Musculoskeletal, Integumentary, Neurologic, Rheumatologic, Endocrinologic, Psychiatric, Lymphatic, and Hematologic/Oncologic systems were reviewed and are negative unless otherwise stated in HPI. Positive and negative findings pertinent to this evaluation are incorporated into the history of present illness.       ECOG PS: 1    PROBLEM LIST:  Patient Active Problem List   Diagnosis    Primary hypertension    Vasomotor rhinitis    Mixed hyperlipidemia    Screen for colon cancer    Malignant neoplasm of right female breast ([address])    Vitamin D deficiency    Encounter for screening for HIV    Prediabetes    Cervical lymphadenopathy    Follow-up examination Right thyroid nodule    GERD (gastroesophageal reflux disease)    Stroke (HCC)    Obesity    Metastatic colon cancer to liver (HCC)    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HCC)    Poor appetite    Anemia    Body mass index (BMI) 40.0-44.9, adult (HCC)    Dehydration    Drug-induced constipation       Assessment / Plan: 71-year-old female with metastatic colon cancer and a bulky locally advanced tonsil cancer. She has had excellent disease response after 6 cycles of FOLFOX in both diseases. My bigger concern is how her head and neck cancer will behave and how much crossover affect I can get from future therapies for both cancers. I'm going to bring her case back to the head neck tumor board on Monday to discuss possibly taking a pause to do some chemotherapy with radiation. There is a concern as well that this will cause a lot of side effects when she has another incurable malignancy. I will touch base with her after a discussion with the group. She does not seem excessively dehydrated as a reason for her lightheadedness. I am getting get an echo to make sure that there is not a cardiac component. A part of her lightheadedness may be due to anemia. Her iron studies may show a component of iron deficiency. Iron levels and percent sat were low and ferritin was 102. The ferritin may be falsely elevated as an acute phase reactant. I am going to repeat another iron panel with her next set of labs and we could consider some IV iron replacement to help with her hemoglobin. I am concerned that if I give it to her orally she may have constipation. I will plan on seeing her prior to cycle 8 of FOLFOX to follow-up on our tumor board discussion with her. I spent 40 minutes on chart review, face to face counseling time, coordination of care and documentation.     Past Medical History:   has a past medical history of Anemia, Arthritis, Breast cancer ([address]) (12/17/2018), Cancer ([address]), GERD (gastroesophageal reflux disease), Hyperlipidemia, Hypertension, Obesity, Stroke ([address]), and Transaminitis (09/22/2021). PAST SURGICAL HISTORY:   has a past surgical history that includes US guided breast biopsy right complete (Right, 11/23/2018); Breast surgery; pr mastectomy partial w/axillary lymphadenectomy (Right, 12/20/2018); Tubal ligation; Breast biopsy (Right, 11/23/2018); US guided injection for research study (12/20/2018); US guided injection for research study (12/7/2018); US guided injection for research study (5/3/2019); US guided lymph node biopsy right (5/26/2023); IR biopsy liver mass (6/27/2023); pr laryngoscopy w/biopsy microscope/telescope (N/A, 7/20/2023); pr brnchsc incl fluor gdnce dx w/cell washg spx (N/A, 7/20/2023); ESOPHAGOSCOPY (N/A, 7/20/2023); and IR port placement (7/28/2023).     CURRENT MEDICATIONS  Current Outpatient Medications   Medication Sig Dispense Refill    anastrozole (ARIMIDEX) 1 mg tablet Take 1 tablet (1 mg total) by mouth daily 90 tablet 3    atorvastatin (LIPITOR) 40 mg tablet Take 1 tablet (40 mg total) by mouth daily at bedtime 30 tablet 2    Cyanocobalamin (B-12 PO) Take by mouth      docusate sodium (COLACE) 50 mg capsule Take 1 capsule (50 mg total) by mouth 2 (two) times a day 90 capsule 1    FOLIC ACID PO Take by mouth      lidocaine-prilocaine (EMLA) cream Apply topically as needed for mild pain 30 g 3    losartan (COZAAR) 50 mg tablet Take 1 tablet (50 mg total) by mouth daily 90 tablet 1    Magnesium Oxide 400 MG CAPS Take 1 tablet (400 mg total) by mouth 2 (two) times a day 14 capsule 0    omeprazole (PriLOSEC) 20 mg delayed release capsule Take 1 capsule (20 mg total) by mouth daily 30 capsule 5    ondansetron (ZOFRAN) 8 mg tablet Take 1 tablet (8 mg total) by mouth every 8 (eight) hours as needed for nausea or vomiting 30 tablet 3    prochlorperazine (COMPAZINE) 10 mg tablet Take 1 tablet (10 mg total) by mouth every 6 (six) hours as needed for nausea or vomiting 30 tablet 3    VITAMIN D PO Take by mouth      [START ON 10/23/2023] fluorouracil 5,375 mg in CADD/Elastomeric Infusion Device Infuse 5,375 mg (1,200 mg/m2/day x 2.24 m2) into a catheter in a vein via infusion device over 46 hours for 2 days  Infusion planned for October 23, 2023. 1 each 0     No current facility-administered medications for this visit. [unfilled]    SOCIAL HISTORY:   reports that she has never smoked. She has never been exposed to tobacco smoke. She has never used smokeless tobacco. She reports that she does not drink alcohol and does not use drugs. FAMILY HISTORY:  family history includes Colon cancer (age of onset: 62) in her mother; Hypertension in her daughter, mother, and son; Pancreatic cancer (age of onset: 61) in her father. ALLERGIES:  has No Known Allergies. Physical Exam:  Vital Signs:   Visit Vitals  /88 (BP Location: Left arm, Patient Position: Sitting, Cuff Size: Large)   Pulse 100   Temp (!) 97 °F (36.1 °C) (Temporal)   Ht 5' 5" (1.651 m)   Wt 114 kg (251 lb 3.2 oz)   SpO2 96%   BMI 41.80 kg/m²   OB Status Postmenopausal   Smoking Status Never   BSA 2.18 m²     Body mass index is 41.8 kg/m². Body surface area is 2.18 meters squared. GEN: Alert, awake oriented x3, in no acute distress  HEENT- No pallor, icterus, cyanosis, no oral mucosal lesions,   LAD - no palpable cervical, clavicle, axillary, inguinal LAD  Heart- normal S1 S2, regular rate and rhythm, No murmur, rubs.    Lungs- clear breathing sound bilateral.   Abdomen- soft, Non tender, bowel sounds present  Extremities- No cyanosis, clubbing, edema  Neuro- No focal neurological deficit    Labs:  Lab Results   Component Value Date    WBC 4.28 (L) 10/06/2023    HGB 9.3 (L) 10/06/2023    HCT 29.7 (L) 10/06/2023    MCV 79 (L) 10/06/2023     10/06/2023     Lab Results   Component Value Date    SODIUM 139 10/06/2023    K 3.4 (L) 10/06/2023     10/06/2023    CO2 26 10/06/2023    AGAP 8 10/06/2023    BUN 14 10/06/2023    CREATININE 0.67 10/06/2023    GLUC 96 10/06/2023    GLUF 110 (H) 08/19/2023    CALCIUM 8.9 10/06/2023    AST 65 (H) 10/06/2023    ALT 57 (H) 10/06/2023    ALKPHOS 120 (H) 10/06/2023    TP 7.2 10/06/2023    TBILI 0.47 10/06/2023    EGFR 91 10/06/2023

## 2023-10-20 NOTE — PROGRESS NOTES
Pt presents for central labs. Port accessed, positive blood return noted, labs collected per protocol. Pt provided with AVS, aware of future appts.

## 2023-10-23 ENCOUNTER — TELEPHONE (OUTPATIENT)
Dept: HEMATOLOGY ONCOLOGY | Facility: CLINIC | Age: 68
End: 2023-10-23

## 2023-10-23 ENCOUNTER — DOCUMENTATION (OUTPATIENT)
Dept: HEMATOLOGY ONCOLOGY | Facility: CLINIC | Age: 68
End: 2023-10-23

## 2023-10-23 ENCOUNTER — APPOINTMENT (OUTPATIENT)
Dept: LAB | Facility: HOSPITAL | Age: 68
End: 2023-10-23
Payer: MEDICARE

## 2023-10-23 ENCOUNTER — TELEPHONE (OUTPATIENT)
Dept: HEMATOLOGY ONCOLOGY | Facility: MEDICAL CENTER | Age: 68
End: 2023-10-23

## 2023-10-23 ENCOUNTER — HOSPITAL ENCOUNTER (OUTPATIENT)
Dept: INFUSION CENTER | Facility: CLINIC | Age: 68
Discharge: HOME | End: 2023-10-23
Payer: MEDICARE

## 2023-10-23 VITALS — HEIGHT: 65 IN | BODY MASS INDEX: 42.09 KG/M2 | WEIGHT: 252.65 LBS

## 2023-10-23 DIAGNOSIS — C78.7 METASTATIC COLON CANCER TO LIVER (HCC): Primary | ICD-10-CM

## 2023-10-23 DIAGNOSIS — C18.9 METASTATIC COLON CANCER TO LIVER (HCC): ICD-10-CM

## 2023-10-23 DIAGNOSIS — C78.7 METASTATIC COLON CANCER TO LIVER (HCC): ICD-10-CM

## 2023-10-23 DIAGNOSIS — C18.9 METASTATIC COLON CANCER TO LIVER (HCC): Primary | ICD-10-CM

## 2023-10-23 LAB
ALBUMIN SERPL BCP-MCNC: 3.8 G/DL (ref 3.5–5)
ALP SERPL-CCNC: 122 U/L (ref 34–104)
ALT SERPL W P-5'-P-CCNC: 53 U/L (ref 7–52)
ANION GAP SERPL CALCULATED.3IONS-SCNC: 14 MMOL/L
AST SERPL W P-5'-P-CCNC: 69 U/L (ref 13–39)
BASOPHILS # BLD AUTO: 0.02 THOUSANDS/ÂΜL (ref 0–0.1)
BASOPHILS NFR BLD AUTO: 1 % (ref 0–1)
BILIRUB SERPL-MCNC: 0.57 MG/DL (ref 0.2–1)
BUN SERPL-MCNC: 9 MG/DL (ref 5–25)
CALCIUM SERPL-MCNC: 9.5 MG/DL (ref 8.4–10.2)
CHLORIDE SERPL-SCNC: 101 MMOL/L (ref 96–108)
CO2 SERPL-SCNC: 23 MMOL/L (ref 21–32)
CREAT SERPL-MCNC: 0.79 MG/DL (ref 0.6–1.3)
EOSINOPHIL # BLD AUTO: 0 THOUSAND/ÂΜL (ref 0–0.61)
EOSINOPHIL NFR BLD AUTO: 0 % (ref 0–6)
ERYTHROCYTE [DISTWIDTH] IN BLOOD BY AUTOMATED COUNT: 18.9 % (ref 11.6–15.1)
GFR SERPL CREATININE-BSD FRML MDRD: 77 ML/MIN/1.73SQ M
GLUCOSE SERPL-MCNC: 166 MG/DL (ref 65–140)
HCT VFR BLD AUTO: 32.1 % (ref 34.8–46.1)
HGB BLD-MCNC: 10.1 G/DL (ref 11.5–15.4)
IMM GRANULOCYTES # BLD AUTO: 0.02 THOUSAND/UL (ref 0–0.2)
IMM GRANULOCYTES NFR BLD AUTO: 1 % (ref 0–2)
LYMPHOCYTES # BLD AUTO: 0.55 THOUSANDS/ÂΜL (ref 0.6–4.47)
LYMPHOCYTES NFR BLD AUTO: 29 % (ref 14–44)
MCH RBC QN AUTO: 24.9 PG (ref 26.8–34.3)
MCHC RBC AUTO-ENTMCNC: 31.5 G/DL (ref 31.4–37.4)
MCV RBC AUTO: 79 FL (ref 82–98)
MONOCYTES # BLD AUTO: 0.1 THOUSAND/ÂΜL (ref 0.17–1.22)
MONOCYTES NFR BLD AUTO: 5 % (ref 4–12)
NEUTROPHILS # BLD AUTO: 1.2 THOUSANDS/ÂΜL (ref 1.85–7.62)
NEUTS SEG NFR BLD AUTO: 64 % (ref 43–75)
NRBC BLD AUTO-RTO: 0 /100 WBCS
PLATELET # BLD AUTO: 227 THOUSANDS/UL (ref 149–390)
PMV BLD AUTO: 10.4 FL (ref 8.9–12.7)
POTASSIUM SERPL-SCNC: 3 MMOL/L (ref 3.5–5.3)
PROT SERPL-MCNC: 8.1 G/DL (ref 6.4–8.4)
RBC # BLD AUTO: 4.06 MILLION/UL (ref 3.81–5.12)
SODIUM SERPL-SCNC: 138 MMOL/L (ref 135–147)
WBC # BLD AUTO: 1.98 THOUSAND/UL (ref 4.31–10.16)

## 2023-10-23 PROCEDURE — 96368 THER/DIAG CONCURRENT INF: CPT

## 2023-10-23 PROCEDURE — G0498 CHEMO EXTEND IV INFUS W/PUMP: HCPCS

## 2023-10-23 PROCEDURE — 36415 COLL VENOUS BLD VENIPUNCTURE: CPT

## 2023-10-23 PROCEDURE — 96411 CHEMO IV PUSH ADDL DRUG: CPT

## 2023-10-23 PROCEDURE — 85025 COMPLETE CBC W/AUTO DIFF WBC: CPT

## 2023-10-23 PROCEDURE — 96415 CHEMO IV INFUSION ADDL HR: CPT

## 2023-10-23 PROCEDURE — 96413 CHEMO IV INFUSION 1 HR: CPT

## 2023-10-23 PROCEDURE — 96367 TX/PROPH/DG ADDL SEQ IV INF: CPT

## 2023-10-23 PROCEDURE — 80053 COMPREHEN METABOLIC PANEL: CPT

## 2023-10-23 RX ORDER — DEXTROSE MONOHYDRATE 50 MG/ML
20 INJECTION, SOLUTION INTRAVENOUS ONCE
Status: COMPLETED | OUTPATIENT
Start: 2023-10-23 | End: 2023-10-23

## 2023-10-23 RX ORDER — SODIUM CHLORIDE 9 MG/ML
20 INJECTION, SOLUTION INTRAVENOUS ONCE AS NEEDED
Status: DISCONTINUED | OUTPATIENT
Start: 2023-10-23 | End: 2023-10-26 | Stop reason: HOSPADM

## 2023-10-23 RX ORDER — FLUOROURACIL 50 MG/ML
900 INJECTION, SOLUTION INTRAVENOUS ONCE
Status: COMPLETED | OUTPATIENT
Start: 2023-10-23 | End: 2023-10-23

## 2023-10-23 RX ADMIN — OXALIPLATIN 200 MG: 5 INJECTION, SOLUTION INTRAVENOUS at 09:26

## 2023-10-23 RX ADMIN — FLUOROURACIL 900 MG: 50 INJECTION, SOLUTION INTRAVENOUS at 11:26

## 2023-10-23 RX ADMIN — SODIUM CHLORIDE 20 ML/HR: 0.9 INJECTION, SOLUTION INTRAVENOUS at 08:56

## 2023-10-23 RX ADMIN — DEXTROSE 20 ML/HR: 5 SOLUTION INTRAVENOUS at 09:26

## 2023-10-23 RX ADMIN — DEXAMETHASONE SODIUM PHOSPHATE: 10 INJECTION, SOLUTION INTRAMUSCULAR; INTRAVENOUS at 08:56

## 2023-10-23 RX ADMIN — LEUCOVORIN CALCIUM 900 MG: 500 INJECTION, POWDER, LYOPHILIZED, FOR SOLUTION INTRAMUSCULAR; INTRAVENOUS at 09:23

## 2023-10-23 NOTE — PROGRESS NOTES
HEAD AND NECK MULTIDISCIPLINARY CASE REVIEW    DATE:  10/23/23      PRESENTING DOCTOR:  Dr. Freddie Yoder     DIAGNOSIS:  Synchronous metastatic colon cancer and right tonsillar cancer. STAGING:  Stage IV colon, [id number] tonsil    Arnoldo Zepeda is a 80 y.o. female who was presented at the [facility] and Neck Oncology Multidisciplinary Tumor Conference today. She has a history of breast cancer s/p BCS and whole breast RT in April 2019, with recent diagnosis of synchronous metastatic colon cancer (M1) as well as squamous cell right tonsillar cancer ([id number]). She completed 6 cycles of FOLFOX with recent PET/CT demonstrating excellent disease response. PHYSICIAN RECOMMENDED PLAN:  Continue with systemic therapy +/- immunotherapy followed by re-imaging. Imaging reviewed:   10/12/23 PET/CT- Marked interval improvement of the previously seen right tonsillar mass. Marked interval improvement of previously seen right cervical adenopathy with mild disease remaining. Diminished size of the right lower lobe pulmonary nodule, with no FDG activity currently seen although too small for accurate characterization. Marked interval partial improvement of hepatic metastases. In addition, the right colonic mass has diminished in size and shows diminished FDG avidity, although remaining hypermetabolic compared to adjacent bowel      6/19/23 PET/CT    Pathology reviewed:  No     Referrals:  None needed at this time. Procedures:  N/A      Team agreed to plan. NCCN guidelines were readily available for review at this discussion    The final treatment plan will be left to the discretion of the patient and the treating physician. DISCLAIMERS:  TO THE TREATING PHYSICIAN:  This conference is a meeting of clinicians from various specialty areas who evaluate and discuss patients for whom a multidisciplinary treatment approach is being considered.  Please note that the above opinion was a consensus of the conference attendees and is intended only to assist in quality care of the discussed patient. The responsibility for follow up on the input given during the conference, along with any final decisions regarding plan of care, is that of the patient and the patient's provider. Accordingly, appointments have only been recommended based on this information and have NOT been scheduled unless otherwise noted. TO THE PATIENT:  This summary is a brief record of major aspects of your cancer treatment. You may choose to share a copy with any of your doctors or nurses. However, this is not a detailed or comprehensive record of your care.

## 2023-10-23 NOTE — PROGRESS NOTES
Reached out to [employer], RN to see if venofer is to be added to pt's treatment. Hanane responded that it will be added next treatment due to having to obtain insurance authorization.

## 2023-10-23 NOTE — TELEPHONE ENCOUNTER
Another message was left for Maira to call the office and inform us or not if [facility] is needed for the 10/27/23 2pm Echo.

## 2023-10-23 NOTE — PROGRESS NOTES
Pt tolerated treatment without incident. Pt connected to BRANDIE and instructed to return to infusion center on Wednesday Oct 25, 2023 at 10 a.m. for disconnect.   Pt was provided with AVS.

## 2023-10-23 NOTE — PROGRESS NOTES
Notified Nayla Larsen RN of pt's episode of SOB today. Hanane to reach out to pt later to see how she is feeling.

## 2023-10-23 NOTE — PROGRESS NOTES
Chart reviewed in preparation of Multidisciplinary Head and Neck Tumor Conference presentation by Dr. Lady Montemayor on 10/23/23. Patient  has a history of breast cancer s/p BCS and whole breast RT in April 2019, with recent diagnosis of synchronous metastatic colon cancer (M1) as well as right tonsillar cancer ([id number]). PET/CT on 6/19/23 demonstrated a large right level 2A lymph node measuring 4.7cm with nodular FDG activity along the right tonsil with soft tissue measuring 1.5cm. There are mildly prominent borderline enlarged mildly FDG avid left cervical lymph nodes suspicious for developing left-sided claudio disease. Other findings on her PET scan include a RLL nodule suspicious for hypermetabolic disease, liver metastases, and focal hypermetabolic mural thickening of the proximal colon. Biopsy of the right level IIA node showed suspicions for malignancy, with DL and right tonsillar biopsy on 7/20/2023 showing p16 positive invasive squamous cell carcinoma. Liver biopsy on 6/27/23 showed metastatic adenocarcinoma suggestive of colorectal primary. Colon biopsy showed well-differentiated adenocarcinoma. She completed 6 cycles of FOLFOX with recent PET/CT demonstrating excellent disease response.

## 2023-10-23 NOTE — PROGRESS NOTES
Pt walked to waiting room and complained to  that she was short of breath. Pulse ox indicated pt was 100 percent on room air but pt seemed to have difficulty breathing. Pt was assisted to sit down and at this time her daughter called on her cell phone. Pt answered phone and staff did talk to daughter who stated that pt has been getting periods of SOB at home. Pt denies this and states that she has only felt like this one other time and that was after her chemotherapy treatment, but by the time [employer] transport arrived it had resolved. Pt did not notify her doctor at that time. After a few minutes, pt stated she was feeling better, back to baseline. Pt's daughter was notified that she should  pt from the infusion center instead of going home with [employer]. Pt's daughter given address of infusion center and pt is currently waiting in the waiting room. Will notify the office of Dr. Angie Olson of same.

## 2023-10-24 ENCOUNTER — TELEPHONE (OUTPATIENT)
Dept: FAMILY MEDICINE CLINIC | Facility: CLINIC | Age: 68
End: 2023-10-24

## 2023-10-24 ENCOUNTER — OFFICE VISIT (OUTPATIENT)
Dept: PALLIATIVE MEDICINE | Facility: CLINIC | Age: 68
End: 2023-10-24
Payer: MEDICARE

## 2023-10-24 ENCOUNTER — TELEPHONE (OUTPATIENT)
Dept: HEMATOLOGY ONCOLOGY | Facility: CLINIC | Age: 68
End: 2023-10-24

## 2023-10-24 VITALS
TEMPERATURE: 96.6 F | BODY MASS INDEX: 42.1 KG/M2 | HEART RATE: 101 BPM | SYSTOLIC BLOOD PRESSURE: 140 MMHG | WEIGHT: 253 LBS | DIASTOLIC BLOOD PRESSURE: 84 MMHG | OXYGEN SATURATION: 96 %

## 2023-10-24 DIAGNOSIS — C09.9 RIGHT TONSILLAR SQUAMOUS CELL CARCINOMA (HCC): ICD-10-CM

## 2023-10-24 DIAGNOSIS — Z17.0 MALIGNANT NEOPLASM OF UPPER-OUTER QUADRANT OF RIGHT BREAST IN FEMALE, ESTROGEN RECEPTOR POSITIVE: ICD-10-CM

## 2023-10-24 DIAGNOSIS — C50.411 MALIGNANT NEOPLASM OF UPPER-OUTER QUADRANT OF RIGHT BREAST IN FEMALE, ESTROGEN RECEPTOR POSITIVE: ICD-10-CM

## 2023-10-24 DIAGNOSIS — C18.9 METASTATIC COLON CANCER TO LIVER (HCC): ICD-10-CM

## 2023-10-24 DIAGNOSIS — R63.0 POOR APPETITE: Primary | ICD-10-CM

## 2023-10-24 DIAGNOSIS — C78.7 METASTATIC COLON CANCER TO LIVER (HCC): ICD-10-CM

## 2023-10-24 PROCEDURE — 99214 OFFICE O/P EST MOD 30 MIN: CPT | Performed by: INTERNAL MEDICINE

## 2023-10-24 NOTE — TELEPHONE ENCOUNTER
VITAMIN D PO       anastrozole (ARIMIDEX) 1 mg tablet     FOLIC ACID PO     PT CAME TO OUR OFFICE REQUESTING FOR MEDICATIONS TO BE SENT TO PHARMACY

## 2023-10-24 NOTE — TELEPHONE ENCOUNTER
Another message was left for Maira to contact the office to let us know if [facility] was needed for her 10/27/23 2pm Echo.

## 2023-10-24 NOTE — TELEPHONE ENCOUNTER
Salo Dunlap,  Please have her contact her oncologist to refill Anastrozole (Arimidex). I have no dose information for Vit D and Folic acid which I suspect her oncologist placed her on. Please have her reach out to them especially now that she is currently being treated for other cancers. Thank you.

## 2023-10-24 NOTE — PROGRESS NOTES
Palliative and Supportive Care   Judy Farrar 80 y.o. female [id number]    Assessment/Plan:  1. Poor appetite    2. Metastatic colon cancer to liver ([address])    3. 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HCC)      Symptoms - continued poor appetite; no other cancer-related symptoms, tolerating treatments well  Encouraged to get her [employer] card instead of waiting for symptoms to get worse before applying, as it takes a while to get her card (2-4 weeks)  Recommend to consider MM[employer] for long term assistance with appetite, energy and anxiety. She may also use this in the future if she develops CINV  No CRP at this time. Did discuss use of opioid on initial visit. Will start low and slow, as patient is opioid-naive, when necessary. Will consider oxyIR 5mg PO q4H prn    Support/GOC/ACP  We will discuss on next visit, when patient ready to talk  She had 5 children, but only 4 knows what is happening with her. She lives with her one daughter. Follow up  RTO in 8 weeks, with Nathan SHARMA. If she continues to have no symptoms by then, asked patient to push back visit further    Controlled Substance Review    [place] PDMP or [place]  reviewed: No red flags were identified; safe to proceed with prescription. No records found    Requested Prescriptions      No prescriptions requested or ordered in this encounter     There are no discontinued medications. Representatives have queried the patient's controlled substance dispensing history in the Prescription Drug Monitoring Program in compliance with regulations before I have prescribed any controlled substances. The prescription history is consistent with prescribed therapy and our practice policies.       20 minutes were spent face to face with Judy Farrar and her daughter with greater than 50% of the time spent in counseling or coordination of care including discussions of etiology of diagnosis, pathogenesis of diagnosis, diagnostic results, impression, and recommendations, risks and benefits of treatment, instructions for disease self management, treatment instructions, follow up requirements, risk factors and risk reduction of disease, patient and family counseling/involvement in care, compliance with treatment regimen and advanced care planning. All of the patient's questions were answered during this discussion. No follow-ups on file. Subjective:   Chief Complaint  Follow up visit for:  symptom management, goal of care assessment and decisional support, disease process education and discussion of prognosis, advance care planning, emotional support in the setting of serious illness  HPI     Filemon Vivas is a 80 y.o. female with Hx of breast cancer s/p surgery and RT (2019) and now with 2 active cancers - metastatic colon cancer to the liver and R tonsillar squamous cell cancer. She was started on mFOLFOX Her PET-CT on 10/12/2023 while on treatments show marked interval improvement of known masses in the R tonsil, R cervical adenopathy, RLL nodule, liver mets, and R colonic mass. Her case was discussed at the tumor board on 10/23, with plans to continue same with re-imaging again in the future. She was last seen on initial consult on 8/3/2023 where she denied any significant symptoms at this time, except for some fatigue, loss of appetite, dysgeusia and mild intermittent gnawing lower abdominal pain since starting chemo. I gave her samples of strawberry Ensure and encourage to take them daily to supplement meals. We talked about trial of low dose steroids in the future if appropriate. Side effects explained. We also talked about cancer pain and management, discussed rationale of direct cancer cares in improving cancer pain and rationale of using multimodal pain regimen in assisting with pain control while waiting for effects of cancer cares. Thankfully, she has no crp to speak of yet.  She said she has not been on opioids in the past. I also recommended MMJ to use long term for appetite, energy, etc. Pamphlet provided. She returns today for routine follow up with her daughter. She looked healthy and stable. Does not appear sick. She reports tolerating her chemo very well. She is very pleased with the results of her latest PET-CT. She struggles with SOB and lightheadedness for which an ECHO was ordered per oncology. She still has poor appetite and anxiety. She initially wanted to wait until her symptoms are worse prior to applying for an [employer] card. I encouraged her to start this process now as it will take another 2-4 weeks before she receives her card from the state. I wrote down symptoms that can be helped by [facility] - poor appetite, poor energy, CINV, anxeity, insomnia. I told her cancer related pain is not effectively controlled by [employer] alone. Thankfully, she continues to have no pain. She said she lives with her one daughter. She has 5 children in general -2 live in the area, 3 others live in other states. Only 4 of them know about her diagnosis. The following portions of the medical history were reviewed: past medical history, problem list, medication list, and social history.     Current Outpatient Medications:     anastrozole (ARIMIDEX) 1 mg tablet, Take 1 tablet (1 mg total) by mouth daily, Disp: 90 tablet, Rfl: 3    atorvastatin (LIPITOR) 40 mg tablet, Take 1 tablet (40 mg total) by mouth daily at bedtime, Disp: 30 tablet, Rfl: 2    Cyanocobalamin (B-12 PO), Take by mouth, Disp: , Rfl:     docusate sodium (COLACE) 50 mg capsule, Take 1 capsule (50 mg total) by mouth 2 (two) times a day, Disp: 90 capsule, Rfl: 1    fluorouracil 5,375 mg in CADD/Elastomeric Infusion Device, Infuse 5,375 mg (1,200 mg/m2/day x 2.24 m2) into a catheter in a vein via infusion device over 46 hours for 2 days  Infusion planned for October 23, 2023., Disp: 1 each, Rfl: 0    FOLIC ACID PO, Take by mouth, Disp: , Rfl:     lidocaine-prilocaine (EMLA) cream, Apply topically as needed for mild pain, Disp: 30 g, Rfl: 3    losartan (COZAAR) 50 mg tablet, Take 1 tablet (50 mg total) by mouth daily, Disp: 90 tablet, Rfl: 1    Magnesium Oxide 400 MG CAPS, Take 1 tablet (400 mg total) by mouth 2 (two) times a day, Disp: 14 capsule, Rfl: 0    omeprazole (PriLOSEC) 20 mg delayed release capsule, Take 1 capsule (20 mg total) by mouth daily, Disp: 30 capsule, Rfl: 5    ondansetron (ZOFRAN) 8 mg tablet, Take 1 tablet (8 mg total) by mouth every 8 (eight) hours as needed for nausea or vomiting, Disp: 30 tablet, Rfl: 3    prochlorperazine (COMPAZINE) 10 mg tablet, Take 1 tablet (10 mg total) by mouth every 6 (six) hours as needed for nausea or vomiting, Disp: 30 tablet, Rfl: 3    VITAMIN D PO, Take by mouth, Disp: , Rfl:   No current facility-administered medications for this visit. Facility-Administered Medications Ordered in Other Visits:     alteplase (CATHFLO) injection 2 mg, 2 mg, Intracatheter, Q1MIN PRN, Ольга Huerta DO    sodium chloride 0.9 % infusion, 20 mL/hr, Intravenous, Once PRN, Ольга Huerta DO, Stopped at 10/23/23 6319  Review of Systems   Constitutional:  Positive for activity change and appetite change. Negative for fatigue and unexpected weight change. HENT:  Negative for trouble swallowing. Respiratory:  Positive for shortness of breath. Cardiovascular:  Negative for chest pain. Gastrointestinal:  Negative for abdominal pain, constipation, diarrhea, nausea and vomiting. Musculoskeletal:  Negative for back pain. Neurological:  Positive for light-headedness. Negative for weakness. Psychiatric/Behavioral:  Negative for sleep disturbance. The patient is not nervous/anxious. All other systems reviewed and are negative.       All other systems negative    Objective:  Vital Signs  /84 (BP Location: Left arm, Patient Position: Sitting, Cuff Size: Standard) Comment (BP Location): left wrist  Pulse 101   Temp (!) 96.6 °F (35.9 °C) (Temporal)   Wt 115 kg (253 lb)   SpO2 96%   BMI 42.10 kg/m²    Physical Exam    Constitutional: Appears well-developed and well-nourished. Appears stable and healthy. Does not appear too sickly. Not toxic appearing. In no acute physical or emotional distress. Head: Normocephalic and atraumatic. Eyes: EOM are normal. No ocular discharge. No scleral icterus. Respiratory: Effort normal. No stridor. No respiratory distress. Gastrointestinal: No abdominal distension. Musculoskeletal: No edema. Neurological: Alert, oriented and appropriately conversant. Skin: No diaphoresis, no rashes seen on exposed areas of skin. Psychiatric: Displays a normal mood and affect.  Behavior, judgement and thought content appear normal.     Brandon Rockwell MD  Palliative Medicine & Supportive Care  Internal Medicine  Available via [facility] Text  Office: [phone]  Fax: [phone]

## 2023-10-25 ENCOUNTER — TELEPHONE (OUTPATIENT)
Dept: HEMATOLOGY ONCOLOGY | Facility: CLINIC | Age: 68
End: 2023-10-25

## 2023-10-25 ENCOUNTER — HOSPITAL ENCOUNTER (OUTPATIENT)
Dept: INFUSION CENTER | Facility: CLINIC | Age: 68
Discharge: HOME | End: 2023-10-25
Payer: MEDICARE

## 2023-10-25 VITALS
HEART RATE: 105 BPM | RESPIRATION RATE: 18 BRPM | SYSTOLIC BLOOD PRESSURE: 137 MMHG | DIASTOLIC BLOOD PRESSURE: 85 MMHG | TEMPERATURE: 97.8 F

## 2023-10-25 DIAGNOSIS — C78.7 METASTATIC COLON CANCER TO LIVER (HCC): Primary | ICD-10-CM

## 2023-10-25 DIAGNOSIS — C18.9 METASTATIC COLON CANCER TO LIVER (HCC): Primary | ICD-10-CM

## 2023-10-25 PROCEDURE — 96360 HYDRATION IV INFUSION INIT: CPT

## 2023-10-25 PROCEDURE — 96372 THER/PROPH/DIAG INJ SC/IM: CPT

## 2023-10-25 RX ADMIN — SODIUM CHLORIDE 1000 ML: 0.9 INJECTION, SOLUTION INTRAVENOUS at 10:08

## 2023-10-25 RX ADMIN — PEGFILGRASTIM 6 MG: 6 INJECTION SUBCUTANEOUS at 10:57

## 2023-10-25 NOTE — PROGRESS NOTES
Pt. Denied new symptoms or concerns today. 5FU Elastomeric pump completed over 46 hours as ordered. Pt given 1L NSS as ordered and tolerated well. First dose Neulasta also administered. Pt instructed in indication for and potential s/e of Neulasta. Pt verbalized understanding. Port flushed with excellent blood return. Future appointments reviewed AVS declined.

## 2023-10-25 NOTE — TELEPHONE ENCOUNTER
Spoke with Onel Hemphill RN at [address] [place] [address] regarding the patient's neulasta. I explained to [place] that the neulasta was added due to low ANC in the middle of treatment. Patient had labs completed after treatment was given, but as the medical oncology team, we are unsure why she had these labs completed. Neulasta to be give on disconnect day with each cycle to prevent neutropenia. Patient was educated and made aware by Ez torre RN at [address] [place] [address].

## 2023-10-27 ENCOUNTER — HOSPITAL ENCOUNTER (OUTPATIENT)
Dept: NON INVASIVE DIAGNOSTICS | Facility: HOSPITAL | Age: 68
Discharge: HOME | End: 2023-10-27
Attending: INTERNAL MEDICINE
Payer: MEDICARE

## 2023-10-27 VITALS
SYSTOLIC BLOOD PRESSURE: 137 MMHG | DIASTOLIC BLOOD PRESSURE: 85 MMHG | HEART RATE: 105 BPM | BODY MASS INDEX: 42.15 KG/M2 | WEIGHT: 253 LBS | HEIGHT: 65 IN

## 2023-10-27 DIAGNOSIS — R42 LIGHTHEADED: ICD-10-CM

## 2023-10-27 LAB
AORTIC ROOT: 2.4 CM
APICAL FOUR CHAMBER EJECTION FRACTION: 65 %
ASCENDING AORTA: 3.1 CM
E WAVE DECELERATION TIME: 128 MS
E/A RATIO: 0.75
FRACTIONAL SHORTENING: 34 (ref 28–44)
INTERVENTRICULAR SEPTUM IN DIASTOLE (PARASTERNAL SHORT AXIS VIEW): 1.4 CM
INTERVENTRICULAR SEPTUM: 1.4 CM (ref 0.6–1.1)
LAAS-AP2: 14 CM2
LAAS-AP4: 13.6 CM2
LEFT ATRIUM SIZE: 2.9 CM
LEFT ATRIUM VOLUME (MOD BIPLANE): 33 ML
LEFT ATRIUM VOLUME INDEX (MOD BIPLANE): 15.1 ML/M2
LEFT INTERNAL DIMENSION IN SYSTOLE: 2.1 CM (ref 2.1–4)
LEFT VENTRICLE DIASTOLIC VOLUME (MOD BIPLANE): 49 ML
LEFT VENTRICLE SYSTOLIC VOLUME (MOD BIPLANE): 19 ML
LEFT VENTRICULAR INTERNAL DIMENSION IN DIASTOLE: 3.2 CM (ref 3.5–6)
LEFT VENTRICULAR POSTERIOR WALL IN END DIASTOLE: 1.2 CM
LEFT VENTRICULAR STROKE VOLUME: 28 ML
LV EF: 62 %
LVSV (TEICH): 28 ML
MV E'TISSUE VEL-SEP: 5 CM/S
MV PEAK A VEL: 0.8 M/S
MV PEAK E VEL: 60 CM/S
MV STENOSIS PRESSURE HALF TIME: 37 MS
MV VALVE AREA P 1/2 METHOD: 5.95
RIGHT ATRIUM AREA SYSTOLE A4C: 12.7 CM2
RIGHT VENTRICLE ID DIMENSION: 3.4 CM
SL CV LEFT ATRIUM LENGTH A2C: 4.6 CM
SL CV LV EF: 75
SL CV PED ECHO LEFT VENTRICLE DIASTOLIC VOLUME (MOD BIPLANE) 2D: 42 ML
SL CV PED ECHO LEFT VENTRICLE SYSTOLIC VOLUME (MOD BIPLANE) 2D: 14 ML
TRICUSPID ANNULAR PLANE SYSTOLIC EXCURSION: 2.1 CM

## 2023-10-27 PROCEDURE — 93306 TTE W/DOPPLER COMPLETE: CPT

## 2023-10-31 ENCOUNTER — PATIENT OUTREACH (OUTPATIENT)
Dept: HEMATOLOGY ONCOLOGY | Facility: CLINIC | Age: 68
End: 2023-10-31

## 2023-10-31 NOTE — PROGRESS NOTES
Called pt to check in and see how she's doing and confirm [facility]  for her upcoming appointments.  No answer, left a message with my contact information for her to call me back

## 2023-11-02 RX ORDER — FLUOROURACIL 50 MG/ML
400 INJECTION, SOLUTION INTRAVENOUS ONCE
OUTPATIENT
Start: 2023-11-06

## 2023-11-02 RX ORDER — SODIUM CHLORIDE 9 MG/ML
20 INJECTION, SOLUTION INTRAVENOUS ONCE AS NEEDED
OUTPATIENT
Start: 2023-11-06

## 2023-11-02 RX ORDER — DEXTROSE MONOHYDRATE 50 MG/ML
20 INJECTION, SOLUTION INTRAVENOUS ONCE
OUTPATIENT
Start: 2023-11-06

## 2023-11-03 ENCOUNTER — HOSPITAL ENCOUNTER (OUTPATIENT)
Dept: INFUSION CENTER | Facility: CLINIC | Age: 68
End: 2023-11-03
Payer: MEDICARE

## 2023-11-03 ENCOUNTER — OFFICE VISIT (OUTPATIENT)
Dept: HEMATOLOGY ONCOLOGY | Facility: CLINIC | Age: 68
End: 2023-11-03
Payer: MEDICARE

## 2023-11-03 VITALS
OXYGEN SATURATION: 99 % | HEART RATE: 110 BPM | HEIGHT: 65 IN | DIASTOLIC BLOOD PRESSURE: 84 MMHG | WEIGHT: 246 LBS | SYSTOLIC BLOOD PRESSURE: 124 MMHG | TEMPERATURE: 97.3 F | RESPIRATION RATE: 17 BRPM | BODY MASS INDEX: 40.98 KG/M2

## 2023-11-03 DIAGNOSIS — C78.7 METASTATIC COLON CANCER TO LIVER (HCC): Primary | ICD-10-CM

## 2023-11-03 DIAGNOSIS — E87.6 HYPOKALEMIA: ICD-10-CM

## 2023-11-03 DIAGNOSIS — C18.9 METASTATIC COLON CANCER TO LIVER (HCC): Primary | ICD-10-CM

## 2023-11-03 DIAGNOSIS — I50.9 CONGESTIVE HEART FAILURE, UNSPECIFIED HF CHRONICITY, UNSPECIFIED HEART FAILURE TYPE (HCC): Primary | ICD-10-CM

## 2023-11-03 LAB
ALBUMIN SERPL BCP-MCNC: 3.5 G/DL (ref 3.5–5)
ALP SERPL-CCNC: 123 U/L (ref 34–104)
ALT SERPL W P-5'-P-CCNC: 41 U/L (ref 7–52)
ANION GAP SERPL CALCULATED.3IONS-SCNC: 10 MMOL/L
ANISOCYTOSIS BLD QL SMEAR: PRESENT
AST SERPL W P-5'-P-CCNC: 73 U/L (ref 13–39)
BASOPHILS # BLD MANUAL: 0.1 THOUSAND/UL (ref 0–0.1)
BASOPHILS NFR MAR MANUAL: 1 % (ref 0–1)
BILIRUB SERPL-MCNC: 0.55 MG/DL (ref 0.2–1)
BUN SERPL-MCNC: 10 MG/DL (ref 5–25)
CALCIUM SERPL-MCNC: 9 MG/DL (ref 8.4–10.2)
CHLORIDE SERPL-SCNC: 104 MMOL/L (ref 96–108)
CO2 SERPL-SCNC: 26 MMOL/L (ref 21–32)
CREAT SERPL-MCNC: 1.19 MG/DL (ref 0.6–1.3)
EOSINOPHIL # BLD MANUAL: 0.38 THOUSAND/UL (ref 0–0.4)
EOSINOPHIL NFR BLD MANUAL: 4 % (ref 0–6)
ERYTHROCYTE [DISTWIDTH] IN BLOOD BY AUTOMATED COUNT: 21.3 % (ref 11.6–15.1)
GFR SERPL CREATININE-BSD FRML MDRD: 47 ML/MIN/1.73SQ M
GLUCOSE SERPL-MCNC: 122 MG/DL (ref 65–140)
HCT VFR BLD AUTO: 29.6 % (ref 34.8–46.1)
HELMET CELLS BLD QL SMEAR: PRESENT
HGB BLD-MCNC: 8.9 G/DL (ref 11.5–15.4)
LYMPHOCYTES # BLD AUTO: 2 THOUSAND/UL (ref 0.6–4.47)
LYMPHOCYTES # BLD AUTO: 21 % (ref 14–44)
MCH RBC QN AUTO: 24.8 PG (ref 26.8–34.3)
MCHC RBC AUTO-ENTMCNC: 30.1 G/DL (ref 31.4–37.4)
MCV RBC AUTO: 83 FL (ref 82–98)
MONOCYTES # BLD AUTO: 0.19 THOUSAND/UL (ref 0–1.22)
MONOCYTES NFR BLD: 2 % (ref 4–12)
MYELOCYTES NFR BLD MANUAL: 2 % (ref 0–1)
NEUTROPHILS # BLD MANUAL: 6.68 THOUSAND/UL (ref 1.85–7.62)
NEUTS BAND NFR BLD MANUAL: 6 % (ref 0–8)
NEUTS SEG NFR BLD AUTO: 64 % (ref 43–75)
NRBC BLD AUTO-RTO: 2 /100 WBC (ref 0–2)
PLATELET # BLD AUTO: 77 THOUSANDS/UL (ref 149–390)
PLATELET BLD QL SMEAR: ABNORMAL
POTASSIUM SERPL-SCNC: 2.9 MMOL/L (ref 3.5–5.3)
PROT SERPL-MCNC: 7.2 G/DL (ref 6.4–8.4)
RBC # BLD AUTO: 3.59 MILLION/UL (ref 3.81–5.12)
SODIUM SERPL-SCNC: 140 MMOL/L (ref 135–147)
WBC # BLD AUTO: 9.54 THOUSAND/UL (ref 4.31–10.16)

## 2023-11-03 PROCEDURE — 80053 COMPREHEN METABOLIC PANEL: CPT | Performed by: INTERNAL MEDICINE

## 2023-11-03 PROCEDURE — 85007 BL SMEAR W/DIFF WBC COUNT: CPT | Performed by: INTERNAL MEDICINE

## 2023-11-03 PROCEDURE — 99215 OFFICE O/P EST HI 40 MIN: CPT | Performed by: INTERNAL MEDICINE

## 2023-11-03 PROCEDURE — 85027 COMPLETE CBC AUTOMATED: CPT | Performed by: INTERNAL MEDICINE

## 2023-11-03 RX ORDER — POTASSIUM CHLORIDE 20 MEQ/1
20 TABLET, EXTENDED RELEASE ORAL 2 TIMES DAILY
Qty: 60 TABLET | Refills: 1 | Status: SHIPPED | OUTPATIENT
Start: 2023-11-03

## 2023-11-03 RX ORDER — SODIUM CHLORIDE 9 MG/ML
20 INJECTION, SOLUTION INTRAVENOUS ONCE AS NEEDED
OUTPATIENT
Start: 2023-12-18

## 2023-11-03 RX ORDER — FLUOROURACIL 50 MG/ML
400 INJECTION, SOLUTION INTRAVENOUS ONCE
OUTPATIENT
Start: 2024-01-01

## 2023-11-03 RX ORDER — SODIUM CHLORIDE 9 MG/ML
20 INJECTION, SOLUTION INTRAVENOUS ONCE AS NEEDED
OUTPATIENT
Start: 2024-01-01

## 2023-11-03 RX ORDER — FLUOROURACIL 50 MG/ML
400 INJECTION, SOLUTION INTRAVENOUS ONCE
OUTPATIENT
Start: 2023-12-04

## 2023-11-03 RX ORDER — DEXTROSE MONOHYDRATE 50 MG/ML
20 INJECTION, SOLUTION INTRAVENOUS ONCE
OUTPATIENT
Start: 2023-12-18

## 2023-11-03 RX ORDER — DEXTROSE MONOHYDRATE 50 MG/ML
20 INJECTION, SOLUTION INTRAVENOUS ONCE
OUTPATIENT
Start: 2023-11-20

## 2023-11-03 RX ORDER — SODIUM CHLORIDE 9 MG/ML
20 INJECTION, SOLUTION INTRAVENOUS ONCE AS NEEDED
OUTPATIENT
Start: 2023-12-04

## 2023-11-03 RX ORDER — SODIUM CHLORIDE 9 MG/ML
20 INJECTION, SOLUTION INTRAVENOUS ONCE AS NEEDED
OUTPATIENT
Start: 2023-11-20

## 2023-11-03 RX ORDER — DEXTROSE MONOHYDRATE 50 MG/ML
20 INJECTION, SOLUTION INTRAVENOUS ONCE
OUTPATIENT
Start: 2024-01-01

## 2023-11-03 RX ORDER — DEXTROSE MONOHYDRATE 50 MG/ML
20 INJECTION, SOLUTION INTRAVENOUS ONCE
OUTPATIENT
Start: 2023-12-04

## 2023-11-03 RX ORDER — FLUOROURACIL 50 MG/ML
400 INJECTION, SOLUTION INTRAVENOUS ONCE
OUTPATIENT
Start: 2023-11-20

## 2023-11-03 RX ORDER — FLUOROURACIL 50 MG/ML
400 INJECTION, SOLUTION INTRAVENOUS ONCE
OUTPATIENT
Start: 2023-12-18

## 2023-11-03 NOTE — PLAN OF CARE
Problem: INFECTION - ADULT  Goal: Absence or prevention of progression during hospitalization  Description: INTERVENTIONS:  - Assess and monitor for signs and symptoms of infection  - Monitor lab/diagnostic results  - Monitor all insertion sites, i.e. indwelling lines, tubes, and drains  - Monitor endotracheal if appropriate and nasal secretions for changes in amount and color  - [place] appropriate cooling/warming therapies per order  - Administer medications as ordered  - Instruct and encourage patient and family to use good hand hygiene technique  - Identify and instruct in appropriate isolation precautions for identified infection/condition  Outcome: Progressing  Goal: Absence of fever/infection during neutropenic period  Description: INTERVENTIONS:  - Monitor WBC    Outcome: Progressing     Problem: Knowledge Deficit  Goal: Patient/family/caregiver demonstrates understanding of disease process, treatment plan, medications, and discharge instructions  Description: Complete learning assessment and assess knowledge base.   Interventions:  - Provide teaching at level of understanding  - Provide teaching via preferred learning methods  Outcome: Progressing     Problem: INFECTION - ADULT  Goal: Absence or prevention of progression during hospitalization  Description: INTERVENTIONS:  - Assess and monitor for signs and symptoms of infection  - Monitor lab/diagnostic results  - Monitor all insertion sites, i.e. indwelling lines, tubes, and drains  - Monitor endotracheal if appropriate and nasal secretions for changes in amount and color  - [place] appropriate cooling/warming therapies per order  - Administer medications as ordered  - Instruct and encourage patient and family to use good hand hygiene technique  - Identify and instruct in appropriate isolation precautions for identified infection/condition  Outcome: Progressing  Goal: Absence of fever/infection during neutropenic period  Description: INTERVENTIONS:  - Monitor WBC    Outcome: Progressing     Problem: Knowledge Deficit  Goal: Patient/family/caregiver demonstrates understanding of disease process, treatment plan, medications, and discharge instructions  Description: Complete learning assessment and assess knowledge base.   Interventions:  - Provide teaching at level of understanding  - Provide teaching via preferred learning methods  Outcome: Progressing

## 2023-11-03 NOTE — PROGRESS NOTES
Pt. Denies new symptoms or concerns today. Labs obtained as ordered via port a cath. Excellent blood return noted. Future appointments reviewed. AVS declined.

## 2023-11-05 NOTE — PROGRESS NOTES
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   - grade 2  - %  - IA 95%  - HER2 negative     2018 Surgery    Right partial mastectomy and SLN biopsy:  Infiltrating ductal carcinoma, Grade 2/3, felt to be between 2.0 cm and 2.5 cm in greatest dimension  - No invasive tumor is seen at inked margins, but there is a focus of DCIS seen within approximately 1mm of the inked medial margin  - no LVI  - no LCIS  - 0/2 LN's  at least Stage IIA - pT2, pN0, cM0, G2.    - Dr Aster Briones     2018 -  Cancer Staged    Stage IIA - pT2, pN0, cM0, G2.       2019 Genomic Testing    Oncotype DX score: 13     2019 -  Hormone Therapy    anastrozole 1 mg daily as adjuvant endocrine therapy    - Dr Shakira Gardner       2019 - 4/3/2019 Radiation    Course: C1    Plan ID Energy Fractions Dose per Fraction (cGy) Dose Correction (cGy) Total Dose Delivered (cGy) Elapsed Days   R Breast 10X/6X 25 / 25 200 0 5,000 40   R BRST BOOST 16E 5 / 5 200 0 1,000 7      Treatment dates:  C1: 2019 - 4/3/2019       Metastatic colon cancer to liver ([address])   2023 Genetic Testing    CARIS Results:   KRAS Pathogenic Variant Exon 2  p.G12D : lack of benefit of cetuximab, panitumumab Level 2   No other actionable mutation; MSI stable; TMB low   MiFOLOXAi Results: "Decreased Benefit of FOLFOX + Bevacizumab in first line metastatic CRC. This patient may achieve improved results by receiving an alternative to FOLFOX, such as FOLFIRI, as their initial regimen. As an adjustment to frontline FOLFOXIRI following toxicity: This patient may achieve improved results by removing the oxaliplatin portion of their regimen".          7/11/2023 Initial Diagnosis    Metastatic colon cancer to liver ([address])     7/13/2023 -  Cancer Staged    Staging form: Colon and Rectum, AJCC 8th Edition  - Clinical stage from 7/13/2023: cT3, cN0, pM1 - Signed by New Finch DO on 9/28/2023  Total positive nodes: 0       7/31/2023 -  Chemotherapy    alteplase (CATHFLO), 2 mg, Intracatheter, Every 1 Minute as needed, 7 of 12 cycles  pegfilgrastim (NEULASTA), 6 mg, Subcutaneous, Once, 1 of 6 cycles  Administration: 6 mg (10/25/2023)  fluorouracil (ADRUCIL), 895 mg, Intravenous, Once, 7 of 12 cycles  Administration: 900 mg (7/31/2023), 900 mg (8/14/2023), 900 mg (8/28/2023), 900 mg (9/11/2023), 900 mg (9/25/2023), 900 mg (10/9/2023), 900 mg (10/23/2023)  nivolumab (OPDIVO) IVPB, 240 mg (100 % of original dose 240 mg), Intravenous, Once, 0 of 4 cycles  Dose modification: 240 mg (original dose 240 mg, Cycle 9)  leucovorin calcium IVPB, 896 mg, Intravenous, Once, 7 of 12 cycles  Administration: 900 mg (7/31/2023), 900 mg (8/14/2023), 900 mg (8/28/2023), 900 mg (9/11/2023), 900 mg (9/25/2023), 900 mg (10/9/2023), 900 mg (10/23/2023)  oxaliplatin (ELOXATIN) chemo infusion, 85 mg/m2 = 190.4 mg, Intravenous, Once, 7 of 12 cycles  Administration: 190.4 mg (7/31/2023), 190.4 mg (8/14/2023), 200 mg (8/28/2023), 200 mg (9/11/2023), 200 mg (9/25/2023), 200 mg (10/9/2023), 200 mg (10/23/2023)  fluorouracil (ADRUCIL) ambulatory infusion Soln, 1,200 mg/m2/day = 5,375 mg, Intravenous, Over 46 hours, 7 of 12 cycles 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[address])   7/27/2023 Initial Diagnosis    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[address])     7/27/2023 -  Cancer Staged    Staging form: Pharynx - Oropharynx, AJCC 8th Edition  - Clinical stage from 7/27/2023: Stage III (cT1, cN3, cM0, p16+) - Signed by Josr Nielsen MD on 7/27/2023  Stage prefix: Initial diagnosis           Interval History: Patient presents for follow up of her metastatic colon cancer and bulky locally advanced squamous cell carcinoma of the head and neck. She is due for cycle 8 of FOLFOX. She has been having episodes of lightheadedness when going from sitting to standing or bending over. I did get a 2D echo to assess this and she does have an EF of 75% with an element of diastolic failure. She has not had any blurry or double vision. No syncope. She has lost 5 pounds since our last visit. She has had ongoing issues with constipation and is taking over-the-counter medication for this. She is starting to have some neuropathy in the tips of her toes. We did discuss her case at our multidisciplinary tumor board and the recommendation was made to hold off on radiation for now. The recommendation was at made to add nivolumab to the FOLFOX. REVIEW OF SYSTEMS:  Please note that a 14-point review of systems was performed to include Constitutional, HEENT, Respiratory, CVS, GI, , Musculoskeletal, Integumentary, Neurologic, Rheumatologic, Endocrinologic, Psychiatric, Lymphatic, and Hematologic/Oncologic systems were reviewed and are negative unless otherwise stated in HPI. Positive and negative findings pertinent to this evaluation are incorporated into the history of present illness.       ECOG PS: 1    PROBLEM LIST:  Patient Active Problem List   Diagnosis    Primary hypertension    Vasomotor rhinitis    Mixed hyperlipidemia    Screen for colon cancer    Malignant neoplasm of right female breast ([address])    Vitamin D deficiency    Encounter for screening for HIV    Prediabetes    Cervical lymphadenopathy    Follow-up examination    Right thyroid nodule    GERD (gastroesophageal reflux disease)    Stroke (HCC)    Obesity    Metastatic colon cancer to liver (HCC)    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HCC)    Poor appetite    Anemia    Body mass index (BMI) 40.0-44.9, adult (HCC)    Dehydration    Drug-induced constipation       Assessment / Plan: 15-year-old female with metastatic colon cancer and a bulky 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 The disease in her neck is stable to palpation and the toxicity of radiation for the head and neck cancer was felt to be too great at this time. Recommendation by the tumor board was to add immunotherapy to the FOLFOX. I recommended adding nivolumab because it is every 2-week dosing. Risks and benefits of adding nivolumab to FOLFOX were discussed and consent was signed. The patient does not have any autoimmune disorders. Her next chemotherapy is Monday and I do not think we will be able to get the authorization by then so we will plan on adding the nivolumab for cycle 9. I will plan on seeing her in follow-up prior to cycle 10 of treatment. I also referred her to our cardio oncologist for the diastolic failure seen on echo and her symptomatic lightheadedness. She knows to call me in the interim with any questions or concerns. I spent 40 minutes on chart review, face to face counseling time, coordination of care and documentation. Past Medical History:   has a past medical history of Anemia, Arthritis, Breast cancer ([address]) (12/17/2018), Cancer ([address]), GERD (gastroesophageal reflux disease), Hyperlipidemia, Hypertension, Obesity, Stroke ([address]), and Transaminitis (09/22/2021). PAST SURGICAL HISTORY:   has a past surgical history that includes US guided breast biopsy right complete (Right, 11/23/2018);  Breast surgery; pr mastectomy partial w/axillary lymphadenectomy (Right, 12/20/2018); Tubal ligation; Breast biopsy (Right, 11/23/2018); US guided injection for research study (12/20/2018); US guided injection for research study (12/7/2018); US guided injection for research study (5/3/2019); US guided lymph node biopsy right (5/26/2023); IR biopsy liver mass (6/27/2023); pr laryngoscopy w/biopsy microscope/telescope (N/A, 7/20/2023); pr [facility] incl fluor gdnce dx w/cell washg spx (N/A, 7/20/2023); ESOPHAGOSCOPY (N/A, 7/20/2023); and IR port placement (7/28/2023).     CURRENT MEDICATIONS  Current Outpatient Medications   Medication Sig Dispense Refill    anastrozole (ARIMIDEX) 1 mg tablet Take 1 tablet (1 mg total) by mouth daily 90 tablet 3    atorvastatin (LIPITOR) 40 mg tablet Take 1 tablet (40 mg total) by mouth daily at bedtime 30 tablet 2    Cyanocobalamin (B-12 PO) Take by mouth      docusate sodium (COLACE) 50 mg capsule Take 1 capsule (50 mg total) by mouth 2 (two) times a day 90 capsule 1    [START ON 11/6/2023] fluorouracil 5,375 mg in CADD/Elastomeric Infusion Device Infuse 5,375 mg (1,200 mg/m2/day x 2.24 m2) into a catheter in a vein via infusion device over 46 hours for 2 days  Infusion planned for November 6, 1815. 1 each 0    FOLIC ACID PO Take by mouth      lidocaine-prilocaine (EMLA) cream Apply topically as needed for mild pain 30 g 3    losartan (COZAAR) 50 mg tablet Take 1 tablet (50 mg total) by mouth daily 90 tablet 1    Magnesium Oxide 400 MG CAPS Take 1 tablet (400 mg total) by mouth 2 (two) times a day 14 capsule 0    omeprazole (PriLOSEC) 20 mg delayed release capsule Take 1 capsule (20 mg total) by mouth daily 30 capsule 5    ondansetron (ZOFRAN) 8 mg tablet Take 1 tablet (8 mg total) by mouth every 8 (eight) hours as needed for nausea or vomiting 30 tablet 3    potassium chloride (K-DUR,KLOR-CON) 20 mEq tablet Take 1 tablet (20 mEq total) by mouth 2 (two) times a day 60 tablet 1    prochlorperazine (COMPAZINE) 10 mg tablet Take 1 tablet (10 mg total) by mouth every 6 (six) hours as needed for nausea or vomiting 30 tablet 3    VITAMIN D PO Take by mouth       No current facility-administered medications for this visit. Facility-Administered Medications Ordered in Other Visits   Medication Dose Route Frequency Provider Last Rate Last Admin    alteplase (CATHFLO) injection 2 mg  2 mg Intracatheter Q1MIN PRN Guzman Jonas DO         [unfilled]    SOCIAL HISTORY:   reports that she has never smoked. She has never been exposed to tobacco smoke. She has never used smokeless tobacco. She reports that she does not drink alcohol and does not use drugs. FAMILY HISTORY:  family history includes Colon cancer (age of onset: 62) in her mother; Hypertension in her daughter, mother, and son; Pancreatic cancer (age of onset: 61) in her father. ALLERGIES:  has No Known Allergies. Physical Exam:  Vital Signs:   Visit Vitals  /84 (BP Location: Left arm, Patient Position: Sitting, Cuff Size: Adult)   Pulse (!) 110   Temp (!) 97.3 °F (36.3 °C)   Resp 17   Ht 5' 5" (1.651 m)   Wt 112 kg (246 lb)   SpO2 99%   BMI 40.94 kg/m²   OB Status Postmenopausal   Smoking Status Never   BSA 2.16 m²     Body mass index is 40.94 kg/m². Body surface area is 2.16 meters squared. GEN: Alert, awake oriented x3, in no acute distress  HEENT- No pallor, icterus, cyanosis, no oral mucosal lesions,   LAD - palpable 4cm right cervical LAD, no clavicle, axillary, inguinal LAD  Heart- normal S1 S2, regular rate and rhythm, No murmur, rubs.    Lungs- clear breathing sound bilateral.   Abdomen- soft, Non tender, bowel sounds present  Extremities- No cyanosis, clubbing, edema  Neuro- No focal neurological deficit    Labs:  Lab Results   Component Value Date    WBC 9.54 11/03/2023    HGB 8.9 (L) 11/03/2023    HCT 29.6 (L) 11/03/2023    MCV 83 11/03/2023    PLT 77 (L) 11/03/2023     Lab Results   Component Value Date    SODIUM 140 11/03/2023    K 2.9 (L) 11/03/2023     11/03/2023    CO2 26 11/03/2023    AGAP 10 11/03/2023    BUN 10 11/03/2023    CREATININE 1.19 11/03/2023    GLUC 122 11/03/2023    GLUF 110 (H) 08/19/2023    CALCIUM 9.0 11/03/2023    AST 73 (H) 11/03/2023    ALT 41 11/03/2023    ALKPHOS 123 (H) 11/03/2023    TP 7.2 11/03/2023    TBILI 0.55 11/03/2023    EGFR 47 11/03/2023

## 2023-11-06 ENCOUNTER — TELEPHONE (OUTPATIENT)
Dept: CARDIOLOGY CLINIC | Facility: CLINIC | Age: 68
End: 2023-11-06

## 2023-11-06 ENCOUNTER — HOSPITAL ENCOUNTER (OUTPATIENT)
Dept: INFUSION CENTER | Facility: CLINIC | Age: 68
Discharge: HOME | End: 2023-11-06
Payer: MEDICARE

## 2023-11-06 ENCOUNTER — NUTRITION (OUTPATIENT)
Dept: NUTRITION | Facility: CLINIC | Age: 68
End: 2023-11-06

## 2023-11-06 VITALS
DIASTOLIC BLOOD PRESSURE: 82 MMHG | TEMPERATURE: 97 F | BODY MASS INDEX: 41.29 KG/M2 | SYSTOLIC BLOOD PRESSURE: 137 MMHG | HEART RATE: 91 BPM | HEIGHT: 65 IN | WEIGHT: 247.8 LBS | RESPIRATION RATE: 18 BRPM

## 2023-11-06 DIAGNOSIS — C78.7 METASTATIC COLON CANCER TO LIVER (HCC): Primary | ICD-10-CM

## 2023-11-06 DIAGNOSIS — Z71.3 NUTRITIONAL COUNSELING: Primary | ICD-10-CM

## 2023-11-06 DIAGNOSIS — C18.9 METASTATIC COLON CANCER TO LIVER (HCC): Primary | ICD-10-CM

## 2023-11-06 PROCEDURE — 96368 THER/DIAG CONCURRENT INF: CPT

## 2023-11-06 PROCEDURE — 96366 THER/PROPH/DIAG IV INF ADDON: CPT

## 2023-11-06 PROCEDURE — G0498 CHEMO EXTEND IV INFUS W/PUMP: HCPCS

## 2023-11-06 PROCEDURE — 96411 CHEMO IV PUSH ADDL DRUG: CPT

## 2023-11-06 PROCEDURE — 96367 TX/PROPH/DG ADDL SEQ IV INF: CPT

## 2023-11-06 PROCEDURE — 96415 CHEMO IV INFUSION ADDL HR: CPT

## 2023-11-06 PROCEDURE — 96413 CHEMO IV INFUSION 1 HR: CPT

## 2023-11-06 RX ORDER — POTASSIUM CHLORIDE 14.9 MG/ML
20 INJECTION INTRAVENOUS ONCE
Status: COMPLETED | OUTPATIENT
Start: 2023-11-06 | End: 2023-11-06

## 2023-11-06 RX ORDER — DEXTROSE MONOHYDRATE 50 MG/ML
20 INJECTION, SOLUTION INTRAVENOUS ONCE
Status: COMPLETED | OUTPATIENT
Start: 2023-11-06 | End: 2023-11-06

## 2023-11-06 RX ORDER — POTASSIUM CHLORIDE 14.9 MG/ML
20 INJECTION INTRAVENOUS ONCE
Status: CANCELLED
Start: 2023-11-06

## 2023-11-06 RX ORDER — SODIUM CHLORIDE 9 MG/ML
20 INJECTION, SOLUTION INTRAVENOUS ONCE AS NEEDED
Status: DISCONTINUED | OUTPATIENT
Start: 2023-11-06 | End: 2023-11-09 | Stop reason: HOSPADM

## 2023-11-06 RX ORDER — FLUOROURACIL 50 MG/ML
400 INJECTION, SOLUTION INTRAVENOUS ONCE
Status: COMPLETED | OUTPATIENT
Start: 2023-11-06 | End: 2023-11-06

## 2023-11-06 RX ADMIN — DEXTROSE 20 ML/HR: 5 SOLUTION INTRAVENOUS at 10:56

## 2023-11-06 RX ADMIN — OXALIPLATIN 200 MG: 100 INJECTION, SOLUTION, CONCENTRATE INTRAVENOUS at 11:19

## 2023-11-06 RX ADMIN — DEXAMETHASONE SODIUM PHOSPHATE: 10 INJECTION, SOLUTION INTRAMUSCULAR; INTRAVENOUS at 10:56

## 2023-11-06 RX ADMIN — FLUOROURACIL 895 MG: 50 INJECTION, SOLUTION INTRAVENOUS at 13:19

## 2023-11-06 RX ADMIN — LEUCOVORIN CALCIUM 900 MG: 200 INJECTION, POWDER, LYOPHILIZED, FOR SUSPENSION INTRAMUSCULAR; INTRAVENOUS at 11:19

## 2023-11-06 RX ADMIN — POTASSIUM CHLORIDE 20 MEQ: 14.9 INJECTION, SOLUTION INTRAVENOUS at 09:03

## 2023-11-06 NOTE — PROGRESS NOTES
Outpatient Oncology Nutrition Consultation   Type of Consult: Follow Up  Care Location: [facility] [place]    Reason for referral: Received notification by Keon RNSimón, on 8/1/23 that pt has triggered for oncology nutrition care (reason for referral: HNC dx and Malnutrition Screening Tool (MST) Triggers: scored a 0 indicating No recent wt loss and is not eating poorly due to a decreased appetite. (Date of MST: 8/1/23)). 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   - grade 2  - %  - WV 95%  - HER2 negative     12/20/2018 Surgery    Right partial mastectomy and SLN biopsy:  Infiltrating ductal carcinoma, Grade 2/3, felt to be between 2.0 cm and 2.5 cm in greatest dimension  - No invasive tumor is seen at inked margins, but there is a focus of DCIS seen within approximately 1mm of the inked medial margin  - no LVI  - no LCIS  - 0/2 LN's  at least Stage IIA - pT2, pN0, cM0, G2.    - Dr Mini Huitron     12/20/2018 -  Cancer Staged    Stage IIA - pT2, pN0, cM0, G2.       1/4/2019 Genomic Testing    Oncotype DX score: 13     2/1/2019 -  Hormone Therapy    anastrozole 1 mg daily as adjuvant endocrine therapy    - Dr Richy Partida       2/14/2019 - 4/3/2019 Radiation    Course: C1    Plan ID Energy Fractions Dose per Fraction (cGy) Dose Correction (cGy) Total Dose Delivered (cGy) Elapsed Days   R Breast 10X/6X 25 / 25 200 0 5,000 40   R BRST BOOST 16E 5 / 5 200 0 1,000 7      Treatment dates:  C1: 2/14/2019 - 4/3/2019       Metastatic colon cancer to liver (HCC)   7/6/2023 Genetic Testing    CARIS Results:   KRAS Pathogenic Variant Exon 2  p.G12D : lack of benefit of cetuximab, panitumumab Level 2   No other actionable mutation; MSI stable; TMB low   MiFOLOXAi Results: "Decreased Benefit of FOLFOX + Bevacizumab in first line metastatic CRC. This patient may achieve improved results by receiving an alternative to FOLFOX, such as FOLFIRI, as their initial regimen. As an adjustment to frontline FOLFOXIRI following toxicity: This patient may achieve improved results by removing the oxaliplatin portion of their regimen".          7/11/2023 Initial Diagnosis    Metastatic colon cancer to liver ([address])     7/13/2023 -  Cancer Staged    Staging form: Colon and Rectum, AJCC 8th Edition  - Clinical stage from 7/13/2023: cT3, cN0, pM1 - Signed by Roberta Joseph DO on 9/28/2023  Total positive nodes: 0       7/31/2023 -  Chemotherapy    potassium chloride, 20 mEq, Intravenous, Once, 1 of 1 cycle  alteplase (CATHFLO), 2 mg, Intracatheter, Every 1 Minute as needed, 8 of 12 cycles  pegfilgrastim (NEULASTA), 6 mg, Subcutaneous, Once, 2 of 6 cycles  Administration: 6 mg (10/25/2023)  fluorouracil (ADRUCIL), 895 mg, Intravenous, Once, 8 of 12 cycles  Administration: 900 mg (7/31/2023), 900 mg (8/14/2023), 900 mg (8/28/2023), 900 mg (9/11/2023), 900 mg (9/25/2023), 900 mg (10/9/2023), 900 mg (10/23/2023)  nivolumab (OPDIVO) IVPB, 240 mg (100 % of original dose 240 mg), Intravenous, Once, 0 of 4 cycles  Dose modification: 240 mg (original dose 240 mg, Cycle 9)  leucovorin calcium IVPB, 896 mg, Intravenous, Once, 8 of 12 cycles  Administration: 900 mg (7/31/2023), 900 mg (8/14/2023), 900 mg (8/28/2023), 900 mg (9/11/2023), 900 mg (9/25/2023), 900 mg (10/9/2023), 900 mg (10/23/2023)  oxaliplatin (ELOXATIN) chemo infusion, 85 mg/m2 = 190.4 mg, Intravenous, Once, 8 of 12 cycles  Administration: 190.4 mg (7/31/2023), 190.4 mg (8/14/2023), 200 mg (8/28/2023), 200 mg (9/11/2023), 200 mg (9/25/2023), 200 mg (10/9/2023), 200 mg (10/23/2023)  fluorouracil (ADRUCIL) ambulatory infusion Soln, 1,200 mg/m2/day = 5,375 mg, Intravenous, Over 46 hours, 8 of 12 cycles     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[address])   7/27/2023 Initial Diagnosis    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[address])     7/27/2023 -  Cancer Staged    Staging form: Pharynx - Oropharynx, AJCC 8th Edition  - Clinical stage from 7/27/2023: Stage III (cT1, cN3, cM0, p16+) - Signed by Maryse Cope MD on 7/27/2023  Stage prefix: Initial diagnosis         Past Medical & Surgical Hx:   Patient Active Problem List   Diagnosis    Primary hypertension    Vasomotor rhinitis    Mixed hyperlipidemia    Screen for colon cancer    Malignant neoplasm of right female breast ([address])    Vitamin D deficiency    Encounter for screening for HIV    Prediabetes    Cervical lymphadenopathy    Follow-up examination    Right thyroid nodule    GERD (gastroesophageal reflux disease)    Stroke ([address])    Obesity    Metastatic colon cancer to liver ([address])    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HCC)    Poor appetite    Anemia    Body mass index (BMI) 40.0-44.9, adult ([address])    Dehydration    Drug-induced constipation     Past Medical History:   Diagnosis Date    Anemia     Arthritis     Breast cancer ([address]) 12/17/2018    Cancer ([address])     right breast, colon, liver, right tonsil    GERD (gastroesophageal reflux disease)     Hyperlipidemia     Hypertension     Obesity     Stroke ([address])     TIA Transaminitis 09/22/2021     Past Surgical History:   Procedure Laterality Date    BREAST BIOPSY Right 11/23/2018    us guided bx cancer    BREAST SURGERY      ESOPHAGOSCOPY N/A 7/20/2023    Procedure: ESOPHAGOSCOPY;  Surgeon: Crissy Guerra MD;  [facility]: [facility];  Service: ENT    IR BIOPSY LIVER MASS  6/27/2023    IR PORT PLACEMENT  7/28/2023     [address] INCL FLUOR GDNCE DX W/CELL WASHG [facility] N/A 7/20/2023    Procedure: BRONCHOSCOPY;  Surgeon: Crissy Guerra MD;  [facility]: [facility];  Service: ENT    MD LARYNGOSCOPY W/BIOPSY MICROSCOPE/TELESCOPE N/A 7/20/2023    Procedure: MICRODIRECT LARYNGOSCOPY WITH BIOPSY;  Surgeon: Crissy Guerra MD;  [facility];  Service: ENT    MD MASTECTOMY PARTIAL W/AXILLARY LYMPHADENECTOMY Right 12/20/2018    Procedure: ULTRASOUND LOCALIZED PARTIAL MASTECTOMY W/SENTINEL NODE BIOPSY POSS AXILLARY DISSECTION;  Surgeon: Rahel Connolly MD;  Location: [address] [place] [address] MAIN OR;  Service: General    TUBAL LIGATION      US GUIDED BREAST BIOPSY RIGHT COMPLETE Right 11/23/2018    US GUIDED INJECTION FOR RESEARCH STUDY  12/20/2018    US GUIDED INJECTION FOR RESEARCH STUDY  12/7/2018    US GUIDED INJECTION FOR RESEARCH STUDY  5/3/2019    US GUIDED LYMPH NODE BIOPSY RIGHT  5/26/2023       Review of Medications:   Vitamins, Supplements and Herbals: No, pt denies taking supplements    Current Outpatient Medications:     anastrozole (ARIMIDEX) 1 mg tablet, Take 1 tablet (1 mg total) by mouth daily, Disp: 90 tablet, Rfl: 3    atorvastatin (LIPITOR) 40 mg tablet, Take 1 tablet (40 mg total) by mouth daily at bedtime, Disp: 30 tablet, Rfl: 2    Cyanocobalamin (B-12 PO), Take by mouth, Disp: , Rfl:     docusate sodium (COLACE) 50 mg capsule, Take 1 capsule (50 mg total) by mouth 2 (two) times a day, Disp: 90 capsule, Rfl: 1    fluorouracil 5,375 mg in CADD/Elastomeric Infusion Device, Infuse 5,375 mg (1,200 mg/m2/day x 2.24 m2) into a catheter in a vein via infusion device over 46 hours for 2 days  Infusion planned for November 6, 2023., Disp: 1 each, Rfl: 0    FOLIC ACID PO, Take by mouth, Disp: , Rfl:     lidocaine-prilocaine (EMLA) cream, Apply topically as needed for mild pain, Disp: 30 g, Rfl: 3    losartan (COZAAR) 50 mg tablet, Take 1 tablet (50 mg total) by mouth daily, Disp: 90 tablet, Rfl: 1    Magnesium Oxide 400 MG CAPS, Take 1 tablet (400 mg total) by mouth 2 (two) times a day, Disp: 14 capsule, Rfl: 0    omeprazole (PriLOSEC) 20 mg delayed release capsule, Take 1 capsule (20 mg total) by mouth daily, Disp: 30 capsule, Rfl: 5    ondansetron (ZOFRAN) 8 mg tablet, Take 1 tablet (8 mg total) by mouth every 8 (eight) hours as needed for nausea or vomiting, Disp: 30 tablet, Rfl: 3    potassium chloride (K-DUR,KLOR-CON) 20 mEq tablet, Take 1 tablet (20 mEq total) by mouth 2 (two) times a day, Disp: 60 tablet, Rfl: 1    prochlorperazine (COMPAZINE) 10 mg tablet, Take 1 tablet (10 mg total) by mouth every 6 (six) hours as needed for nausea or vomiting, Disp: 30 tablet, Rfl: 3    VITAMIN D PO, Take by mouth, Disp: , Rfl:   No current facility-administered medications for this visit.     Facility-Administered Medications Ordered in Other Visits:     alteplase (CATHFLO) injection 2 mg, 2 mg, Intracatheter, Q1MIN PRN, Danitza Romiet, DO    dextrose 5 % infusion, 20 mL/hr, Intravenous, Once, Danitza Halt, DO    fluorouracil (ADRUCIL) injection 895 mg, 400 mg/m2 (Treatment Plan Recorded), Intravenous, Once, Danitza Halt, DO    leucovorin 900 mg in dextrose 5 % 250 mL IVPB, 900 mg, Intravenous, Once, Danitza Halt, DO    ondansetron (ZOFRAN) 16 mg, dexamethasone (DECADRON) 10 mg in sodium chloride 0.9 % 50 mL IVPB, , Intravenous, Once, Danitza Halt, DO    oxaliplatin (ELOXATIN) 200 mg in dextrose 5 % 250 mL chemo infusion, 200 mg, Intravenous, Once, Danitza Halt, DO    potassium chloride 20 mEq IVPB (premix), 20 mEq, Intravenous, Once, Danitza Burton DO, Last Rate: 50 mL/hr at 11/06/23 [id number], 20 mEq at 11/06/23 0903    sodium chloride 0.9 % infusion, 20 mL/hr, Intravenous, Once PRN, Ace Pacini, DO    Most Recent Lab Results:   Lab Results   Component Value Date    WBC 9.54 11/03/2023    NEUTROABS 1.20 (L) 10/23/2023    IRON 27 (L) 10/20/2023    TIBC 327 10/20/2023    FERRITIN 139 10/20/2023    CHOLESTEROL 222 (H) 06/13/2023    TRIG 140 06/13/2023    HDL 44 (L) 06/13/2023    LDLCALC 150 (H) 06/13/2023    ALT 41 11/03/2023    AST 73 (H) 11/03/2023    ALB 3.5 11/03/2023    SODIUM 140 11/03/2023    SODIUM 138 10/23/2023    K 2.9 (L) 11/03/2023    K 3.0 (L) 10/23/2023     11/03/2023    BUN 10 11/03/2023    BUN 9 10/23/2023    CREATININE 1.19 11/03/2023    CREATININE 0.79 10/23/2023    EGFR 47 11/03/2023    POCGLU 91 10/12/2023    GLUF 110 (H) 08/19/2023    GLUF 113 (H) 07/24/2023    GLUC 122 11/03/2023    HGBA1C 5.9 (H) 06/13/2023    HGBA1C 6.1 (H) 07/09/2022    HGBA1C 6.1 (H) 09/16/2021    CALCIUM 9.0 11/03/2023    FOLATE >20.0 (H) 05/23/2019    MG 1.8 (L) 09/11/2023       Anthropometric Measurements:   Height: 66"  Ht Readings from Last 1 Encounters:   11/06/23 5' 5" (1.651 m)     Wt Readings from Last 20 Encounters:   11/06/23 112 kg (247 lb 12.8 oz)   11/03/23 112 kg (246 lb)   10/27/23 115 kg (253 lb)   10/24/23 115 kg (253 lb)   10/23/23 115 kg (252 lb 10.4 oz)   10/17/23 114 kg (251 lb 3.2 oz)   10/09/23 116 kg (255 lb 11.7 oz)   10/09/23 117 kg (257 lb)   10/06/23 115 kg (253 lb)   09/25/23 116 kg (256 lb 13.4 oz)   09/22/23 115 kg (253 lb 3.2 oz)   09/11/23 119 kg (261 lb 7.5 oz)   09/08/23 117 kg (258 lb 9.6 oz)   08/28/23 118 kg (261 lb 0.4 oz)   08/23/23 115 kg (254 lb)   08/22/23 116 kg (255 lb)   08/14/23 119 kg (262 lb 5.6 oz)   08/11/23 119 kg (262 lb)   08/03/23 118 kg (260 lb 12.8 oz)   08/01/23 121 kg (267 lb)       Weight History:   Usual Weight: 275#  Varian: (2/22/19) 264.6#, (4/2/19) 263.4  Home Scale: (somtime in july) 276#    Oncology Nutrition-Anthropometrics Flowsheet Row Nutrition from 11/6/2023 in [facility] Nutrition from 10/9/2023 in [facility]   Patient [age] (years): 80 years 80 years   Patient (female) height (in): 77 in 77 in   Current Weight to be used for anthropometric calculations (lbs) 247.8 lbs 255.7 lbs   Current Weight to be used for anthropometric calculations (kg) 112.6 kg 116.2 kg   BMI: 40 41.3   IBW female: 130 lbs 130 lbs   IBW (kg) female: 59.1 kg 59.1 kg   IBW % (female) 190.6 % 196.7 %   Adjusted BW (female): 159.5 lbs 161.4 lbs   Adjusted BW kg (female): 72.5 kg 73.4 kg   % weight change after 1 month: -2.1 % -1.1 %   Weight change after 1 month (lbs) -5.2 lbs -2.9 lbs   % weight change after 3 months: -5 % -2.8 %   Weight change after 3 months (lbs) -13 lbs -7.3 lbs            Nutrition-Focused Physical Findings: none observed    Food/Nutrition-Related History & Client/Social History:    Current Nutrition Impact Symptoms:  [] Nausea  [x] Reduced Appetite -decreased lately [] Acid Reflux    [] Vomiting  [x] Unintended Wt Loss [] Malabsorption    [] Diarrhea  [] Unintended Wt Gain  [] Dumping Syndrome    [x] Constipation -colace and dulcolax.  -Bms usually every other day [] Thick Mucous/Secretions  [] Abdominal Pain    [x] Dysgeusia (Altered Taste) -starting to improve [x] Xerostomia (Dry Mouth)  [] Gas    [] Dysosmia (Altered Smell)  [] Thrush  [] Difficulty Chewing    [] Oral Mucositis (Sore Mouth)  [x] Fatigue  [] Hyperglycemia   Lab Results   Component Value Date    HGBA1C 5.9 (H) 06/13/2023      [] Odynophagia  [] Esophagitis  [] Other:    [] Dysphagia -swallow study 8/15/23- regular diet/thin liquids [] Early Satiety  [] No Problems Eating      Food Allergies & Intolerances: no    Current Diet: Regular Diet, No Restrictions  Current Nutrition Intake: Decreased since last visit  Appetite: Fair , Poor  Nutrition Route: PO  Oral Care: brushes daily  Activity level: ADLs, very fatigued so activity is limited     24 Hr Diet Recall:   Breakfast: cup of tea  Snack: nothing  Lunch: Big mac and [place] fries (maybe ate 50%)  Snack: applesauce  Dinner: Tukey, mashed potatoes, broccoli  Snack: applesauce    Beverages: hydration water (33oz x 1.5), iced tea (8oz x3-4), soda (12oz x1)  Supplements:   Ensure Complete (10 oz, 350 kcal, 30 g pro) tries to drink daily, not always      Oncology Nutrition-Estimated Needs      Flowsheet Row Nutrition from 2023 in [facility]   Weight type used Adjusted weight   Weight in kilograms (kg) used for estimated needs 74.1 kg   Energy needs formula:  30-35 kcal/kg   Energy needs based on 30 kcal/k kcal   Energy needs based on 35 kcal/k kcal   Protein needs formula: 1.2-1.5 g/kg   Protein needs based on 1.2 g/k g   Protein needs based on 1.5 g/kg 111 g   Fluid needs formula: 30-35 mL/kg   Fluid needs based on 30 mL/kg 2223 mL   Fluid needs in ounces 75 oz   Fluid needs based on 35 mL/kg 2594 mL   Fluid needs in ounces 88 oz             Discussion & Intervention:   Oleksandr Linares was evaluated today for an RD follow up regarding HNC. Oleksandr Linares is currently undergoing tx for metastatic colon cancer . Met with Maira today during her infusion. She reports appetite has been decreased lately. Only eating ~2 meals/day and not always finishing these meals. Her weight is down. She is still experiencing constipation but bowel medications are helping. Discussed importance of adequate hydration and calorie/protein intake. Encouraged her to eat small meals more frequently throughout the day and to consistently drink her Ensure complete shakes. Reviewed 24 hour recall, which revealed an suboptimal po intake, and discussed ways to increase kcal, protein, and fluid intakes and optimize nutrient intake.   Also reviewed the importance of wt management throughout the tx process and the role of a high kcal/ high protein diet in managing wt and overall health. Based on today's assessment, discussion included: MNT for: dysgeusia, xerostomia, decreased appetite, hydration, constipation, how to modify foods for anticipated nutrition impact symptoms pt may experience during CA tx, practicing proper oral care, a high kcal/protein diet & food choices to include at all meals & snacks (Examples of high kcal foods: cheese, full-fat dairy products, nut butter, plant-based fats, coconut oil/milk, avocado, butter, cream soup, etc. Examples of high protein foods: eggs, chicken, fish, beans/legumes, nuts/nut butters, bone broth, etc.) , fortifying foods for added kcal and protein (examples include: adding cheese to foods such as eggs, mashed potatoes, casseroles, etc.; Making oatmeal with whole milk rather than water; Making fortified mashed potatoes with cream, butter, dry milk powder, plain [place] yogurt, and cheese.), adequate hydration & fluid choices, eating smaller more frequent meals every 2-3 hours (5-6 small meals/day), eating when feeling most hungry, increasing oral nutrition supplements, and adding extra fat to foods while cooking such as butter, plant-based oil, coconut oil/milk, avocado, nut butters, etc..   Moving forward, Incolasyt Cowden will continue current nutrition plan of care and practice MNT for nutrition impact sx management   Materials Provided: not applicable  All questions and concerns addressed during today’s visit. Hoyt Cowden has RD contact information. Nutrition Diagnosis:   Inadequate Energy Intake related to physiological causes, disease state and treatment related issues as evidenced by food recall, wt loss and discussion with pt and/or family. Increased Nutrient Needs (kcal & pro) related to increased demand for nutrients and disease state as evidenced by cancer dx and pt undergoing tx for cancer.   Monitoring & Evaluation:   Goals:  weight maintenance/stabilization  adequate nutrition impact symptom management  pt to meet >/=75% estimated nutrition needs daily    Progress Towards Goals: Progressing    Nutrition Rx & Recommendations:  Diet: High Calorie, High Protein (for high calorie foods see pages 52-53, and for high protein foods see pages 49-51 in your Eating Hints book)  Keep a daily food journal (pen/paper, akua such as [employer]). Nutrition Supplements: consume Ensure complete at least once daily. May use homemade shakes/smoothies as desired. If using a pre-made shake/bar, choose ones with >300 calories and >10 grams protein (ex. Ensure Complete, Ensure Enlive, Ensure Plus, Boost Plus, Boost Very High Calorie, etc.). Small, frequent meals/snacks may be easier to tolerate than 3 large daily meals. Aim for 5-6 small meals per day (every 2-3 hours). Include protein at all meals/snacks. Include a variety of foods (as tolerated/allowed by diet). Incorporate physical activity as able/allowed. Stay hydrated by sipping fluids of choice/tolerance throughout the day. Liquid nutrition may be better tolerated than solids at times. Alter food choices and eating patterns to accommodate changing needs. Refer to Eating Hints book for other meal/snack ideas and symptom management. Follow proper oral care; Try baking soda/salt water rinse recipe (mix 3/4 tsp salt + 1 tsp baking soda + 1 qt water; rinse with plain water after using) in Eating Hints book (pg 18). Brush your teeth before/after meals & before bed. For lack of taste: Practice good oral care. Blend fresh fruits into shakes, ice cream or yogurt. Eat frozen fruits: grapes, chopped cantaloupe, watermelon. Select fresh vegetables (may be more appealing than canned/frozen). Add extra flavor to foods: experiment with spices, salt & sweeteners, extra oil/butter, acidic foods; marinate meats (see pages 29-30 in your Eating Hints book).   For appetite loss: try powdered or liquid nutrition supplements; eating by the clock every 2-3 hours; set a timer to remind yourself to eat, keep snacks nearby; add extra protein & calories to your diet; drink liquids in between meals, choose liquids that add calories; eat a bedtime snack; eat soft, cool or frozen foods; eat a larger meal when you feel well & rested; only sip small amounts of liquids during meals (see pages 10-12 in your Eating Hints book). For weight loss: monitor your weight at home at least 2x/week, record your weight, start by adding 250-500 extra calories per day, eat 5-6 small scheduled meals every 2-3 hrs, choose foods that are high in protein and calories (see pages 49-53 in your Eating Hints book), drink liquids with calories for example: milkshakes/smoothies/juice/soup/whole milk/chocolate milk, cook with protein fortified milk (see recipe on page 36 in your Eating Hints book), consider ready-to-drink oral nutrition supplements such as Ensure Plus, Ensure Enlive, Boost Plus, or Boost Very High Calorie, avoid "diet" and "light" foods when possible, avoid drinking too much with meals, contact your dietitian with any continued weight loss over the course of 1 week. For more info see pages 35-37 in your Eating Hints book. For constipation: drink plenty of fluids (>64 oz/day); drink hot liquids; eat high-fiber foods as tolerated (whole grains, beans, peas, nuts, seeds, fruits, vegetables, etc); increase physical activity as tolerated. Avoid increasing fiber intake too quickly, add fiber into your diet slowly; keep a record of your bowel movements (see pages 13-14 in you Eating Hints book).   For dry mouth: sip water throughout the day; try very sweet (or tart, as tolerated) drinks; chew gum or suck on hard candy, popsicles, & ice chips; eat easy to swallow foods (such as pureed cooked foods or soups); moisten food with sauce/gravy/salad dressing; do not drink beer, wine, or any type of alcohol; keep lips moist with lip balm; avoid tobacco products & second-hand smoke; try Biotene as needed (see pages 17-18 in your Eating Hints book). Follow proper oral care; Try baking soda/salt water rinse recipe (mix 3/4 tsp salt + 1 tsp baking soda + 1 qt water; rinse with pain water after using) in Eating Hints book (pg 18). Brush your teeth before/after meals & before bed. Weigh yourself regularly. If you notice weight loss, make an effort to increase your daily food/calorie intake. If you continue to notice loss after these efforts, reach out to your dietitian to establish a plan to stabilize weight. Consider trying "Apple/Prune Sauce" recipe on page 14 of your Eating Hints booklet. Be sure to drink 8 oz of water after taking 1-2 tbsp of the mixture before bedtime. [place] with the [employer].A[employer]S.S. Concept - add extra Fat, Acidity (as long as you do not have mouth or throat pain), Salt, and Sweetness to foods to help improve flavor.   Try using greek yogurt instead of regular  Snack ideas:  [place] yogurt  Cottage cheese and fruit  Nuts or nut butter and crackers  Soup or bone broth  Egg chicken or tuna salad   Oral nutrition supplement  1/2 sandwich    Follow Up Plan:  12/4/23 during infusion  Recommend Referral to Other Providers: none at this time

## 2023-11-06 NOTE — PROGRESS NOTES
Patient tolerated infusion without incident. BRANDIE pump connected to patient, aware of when to return for DC.  Denies need for AVS.

## 2023-11-06 NOTE — PATIENT INSTRUCTIONS
Nutrition Rx & Recommendations:  Diet: High Calorie, High Protein (for high calorie foods see pages 52-53, and for high protein foods see pages 49-51 in your Eating Hints book)  Keep a daily food journal (pen/paper, akua such as [employer]). Nutrition Supplements: consume Ensure complete at least once daily. May use homemade shakes/smoothies as desired. If using a pre-made shake/bar, choose ones with >300 calories and >10 grams protein (ex. Ensure Complete, Ensure Enlive, Ensure Plus, Boost Plus, Boost Very High Calorie, etc.). Small, frequent meals/snacks may be easier to tolerate than 3 large daily meals. Aim for 5-6 small meals per day (every 2-3 hours). Include protein at all meals/snacks. Include a variety of foods (as tolerated/allowed by diet). Incorporate physical activity as able/allowed. Stay hydrated by sipping fluids of choice/tolerance throughout the day. Liquid nutrition may be better tolerated than solids at times. Alter food choices and eating patterns to accommodate changing needs. Refer to Eating Hints book for other meal/snack ideas and symptom management. Follow proper oral care; Try baking soda/salt water rinse recipe (mix 3/4 tsp salt + 1 tsp baking soda + 1 qt water; rinse with plain water after using) in Eating Hints book (pg 18). Brush your teeth before/after meals & before bed. For lack of taste: Practice good oral care. Blend fresh fruits into shakes, ice cream or yogurt. Eat frozen fruits: grapes, chopped cantaloupe, watermelon. Select fresh vegetables (may be more appealing than canned/frozen). Add extra flavor to foods: experiment with spices, salt & sweeteners, extra oil/butter, acidic foods; marinate meats (see pages 29-30 in your Eating Hints book).   For appetite loss: try powdered or liquid nutrition supplements; eating by the clock every 2-3 hours; set a timer to remind yourself to eat, keep snacks nearby; add extra protein & calories to your diet; drink liquids in between meals, choose liquids that add calories; eat a bedtime snack; eat soft, cool or frozen foods; eat a larger meal when you feel well & rested; only sip small amounts of liquids during meals (see pages 10-12 in your Eating Hints book). For weight loss: monitor your weight at home at least 2x/week, record your weight, start by adding 250-500 extra calories per day, eat 5-6 small scheduled meals every 2-3 hrs, choose foods that are high in protein and calories (see pages 49-53 in your Eating Hints book), drink liquids with calories for example: milkshakes/smoothies/juice/soup/whole milk/chocolate milk, cook with protein fortified milk (see recipe on page 36 in your Eating Hints book), consider ready-to-drink oral nutrition supplements such as Ensure Plus, Ensure Enlive, Boost Plus, or Boost Very High Calorie, avoid "diet" and "light" foods when possible, avoid drinking too much with meals, contact your dietitian with any continued weight loss over the course of 1 week. For more info see pages 35-37 in your Eating Hints book. For constipation: drink plenty of fluids (>64 oz/day); drink hot liquids; eat high-fiber foods as tolerated (whole grains, beans, peas, nuts, seeds, fruits, vegetables, etc); increase physical activity as tolerated. Avoid increasing fiber intake too quickly, add fiber into your diet slowly; keep a record of your bowel movements (see pages 13-14 in you Eating Hints book). For dry mouth: sip water throughout the day; try very sweet (or tart, as tolerated) drinks; chew gum or suck on hard candy, popsicles, & ice chips; eat easy to swallow foods (such as pureed cooked foods or soups); moisten food with sauce/gravy/salad dressing; do not drink beer, wine, or any type of alcohol; keep lips moist with lip balm; avoid tobacco products & second-hand smoke; try Biotene as needed (see pages 17-18 in your Eating Hints book).   Follow proper oral care; Try baking soda/salt water rinse recipe (mix 3/4 tsp salt + 1 tsp baking soda + 1 qt water; rinse with pain water after using) in Eating Hints book (pg 18). Brush your teeth before/after meals & before bed. Weigh yourself regularly. If you notice weight loss, make an effort to increase your daily food/calorie intake. If you continue to notice loss after these efforts, reach out to your dietitian to establish a plan to stabilize weight. Consider trying "Apple/Prune Sauce" recipe on page 14 of your Eating Hints booklet. Be sure to drink 8 oz of water after taking 1-2 tbsp of the mixture before bedtime. [employer] with [employer] - add extra Fat, Acidity (as long as you do not have mouth or throat pain), Salt, and Sweetness to foods to help improve flavor.   Try using greek yogurt instead of regular  Snack ideas:  [place] yogurt  Cottage cheese and fruit  Nuts or nut butter and crackers  Soup or bone broth  Egg chicken or tuna salad   Oral nutrition supplement  1/2 sandwich    Follow Up Plan: 12/4/23 during infusion  Recommend Referral to Other Providers: none at this time

## 2023-11-06 NOTE — TELEPHONE ENCOUNTER
Good morning,    Patient is being referred to Cardiology for a heart failure consult. Please schedule patient with a general Cardiologist at the [facility]. Please manage the referral accordingly.      Thank you,    Lena Khan

## 2023-11-07 ENCOUNTER — TELEPHONE (OUTPATIENT)
Dept: HEMATOLOGY ONCOLOGY | Facility: CLINIC | Age: 68
End: 2023-11-07

## 2023-11-07 NOTE — TELEPHONE ENCOUNTER
Left VM for patient to let her know that her potassium prescription is ready to be picked up at [facility]. I left my direct teams number for any questions or concerns.

## 2023-11-08 ENCOUNTER — HOSPITAL ENCOUNTER (OUTPATIENT)
Dept: INFUSION CENTER | Facility: CLINIC | Age: 68
Discharge: HOME | End: 2023-11-08
Payer: MEDICARE

## 2023-11-08 ENCOUNTER — TELEPHONE (OUTPATIENT)
Dept: HEMATOLOGY ONCOLOGY | Facility: CLINIC | Age: 68
End: 2023-11-08

## 2023-11-08 VITALS
HEART RATE: 106 BPM | RESPIRATION RATE: 18 BRPM | SYSTOLIC BLOOD PRESSURE: 156 MMHG | DIASTOLIC BLOOD PRESSURE: 87 MMHG | TEMPERATURE: 96.9 F

## 2023-11-08 DIAGNOSIS — E86.0 DEHYDRATION: ICD-10-CM

## 2023-11-08 DIAGNOSIS — C18.9 METASTATIC COLON CANCER TO LIVER (HCC): Primary | ICD-10-CM

## 2023-11-08 DIAGNOSIS — C78.7 METASTATIC COLON CANCER TO LIVER (HCC): Primary | ICD-10-CM

## 2023-11-08 PROCEDURE — 96372 THER/PROPH/DIAG INJ SC/IM: CPT

## 2023-11-08 RX ADMIN — SODIUM CHLORIDE 1000 ML: 0.9 INJECTION, SOLUTION INTRAVENOUS at 10:15

## 2023-11-08 RX ADMIN — PEGFILGRASTIM 6 MG: 6 INJECTION SUBCUTANEOUS at 11:52

## 2023-11-08 NOTE — PROGRESS NOTES
Fluorouracil BRANDIE discontinued as per order. Hydration and neulasta given as per order. Patient tolerated treatments without any complications. AVS given to patient.

## 2023-11-09 PROBLEM — T45.1X5A CHEMOTHERAPY INDUCED NEUTROPENIA: Status: ACTIVE | Noted: 2023-11-09

## 2023-11-09 PROBLEM — D70.1 CHEMOTHERAPY INDUCED NEUTROPENIA: Status: ACTIVE | Noted: 2023-11-09

## 2023-11-09 NOTE — ADDENDUM NOTE
Encounter addended by: Jimbo Euceda DO on: 11/9/2023 7:46 AM   Actions taken: Problem List modified, Clinical Note Signed

## 2023-11-09 NOTE — PROGRESS NOTES
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

## 2023-11-10 ENCOUNTER — PATIENT OUTREACH (OUTPATIENT)
Dept: HEMATOLOGY ONCOLOGY | Facility: CLINIC | Age: 68
End: 2023-11-10

## 2023-11-10 ENCOUNTER — HOSPITAL ENCOUNTER (OUTPATIENT)
Dept: INFUSION CENTER | Facility: HOSPITAL | Age: 68
Discharge: HOME | End: 2023-11-10

## 2023-11-10 NOTE — PROGRESS NOTES
Pt called stating she received an e mail that she is scheduled for labs today at 9:30am at the [facility] [place]. She had not received a confirmation from  [address]. When I reviewed her appt, she usually does not go to the [facility], and is not due for her pre chemo labs at this time. I called the [facility] infusion center and they rescheduled her for 11/16/2023 at the [address] [place] [address]. Both pt and  BRANDON   were notified of this change.  She was thankful for the assistance in getting things straightened out

## 2023-11-13 ENCOUNTER — TELEPHONE (OUTPATIENT)
Dept: CARDIOLOGY CLINIC | Facility: CLINIC | Age: 68
End: 2023-11-13

## 2023-11-13 NOTE — TELEPHONE ENCOUNTER
Can another attempt be made to call the patient to schedule a heart failure consult.      Thank you,    Prakash Diaz

## 2023-11-14 DIAGNOSIS — C78.7 METASTATIC COLON CANCER TO LIVER (HCC): Primary | ICD-10-CM

## 2023-11-14 DIAGNOSIS — C18.9 METASTATIC COLON CANCER TO LIVER (HCC): Primary | ICD-10-CM

## 2023-11-16 ENCOUNTER — TELEPHONE (OUTPATIENT)
Dept: HEMATOLOGY ONCOLOGY | Facility: CLINIC | Age: 68
End: 2023-11-16

## 2023-11-16 ENCOUNTER — HOSPITAL ENCOUNTER (OUTPATIENT)
Dept: INFUSION CENTER | Facility: CLINIC | Age: 68
Discharge: HOME | End: 2023-11-16
Payer: MEDICARE

## 2023-11-16 DIAGNOSIS — C18.9 METASTATIC COLON CANCER TO LIVER (HCC): Primary | ICD-10-CM

## 2023-11-16 DIAGNOSIS — C78.7 METASTATIC COLON CANCER TO LIVER (HCC): Primary | ICD-10-CM

## 2023-11-16 LAB
ALBUMIN SERPL BCP-MCNC: 3.6 G/DL (ref 3.5–5)
ALP SERPL-CCNC: 134 U/L (ref 34–104)
ALT SERPL W P-5'-P-CCNC: 40 U/L (ref 7–52)
ANION GAP SERPL CALCULATED.3IONS-SCNC: 8 MMOL/L
AST SERPL W P-5'-P-CCNC: 58 U/L (ref 13–39)
BASOPHILS # BLD AUTO: 0.04 THOUSANDS/ÂΜL (ref 0–0.1)
BASOPHILS NFR BLD AUTO: 1 % (ref 0–1)
BILIRUB SERPL-MCNC: 0.51 MG/DL (ref 0.2–1)
BUN SERPL-MCNC: 8 MG/DL (ref 5–25)
CALCIUM SERPL-MCNC: 9.2 MG/DL (ref 8.4–10.2)
CHLORIDE SERPL-SCNC: 107 MMOL/L (ref 96–108)
CO2 SERPL-SCNC: 26 MMOL/L (ref 21–32)
CREAT SERPL-MCNC: 0.72 MG/DL (ref 0.6–1.3)
EOSINOPHIL # BLD AUTO: 0.07 THOUSAND/ÂΜL (ref 0–0.61)
EOSINOPHIL NFR BLD AUTO: 1 % (ref 0–6)
ERYTHROCYTE [DISTWIDTH] IN BLOOD BY AUTOMATED COUNT: 21.5 % (ref 11.6–15.1)
GFR SERPL CREATININE-BSD FRML MDRD: 86 ML/MIN/1.73SQ M
GLUCOSE SERPL-MCNC: 136 MG/DL (ref 65–140)
HCT VFR BLD AUTO: 27.5 % (ref 34.8–46.1)
HGB BLD-MCNC: 8.7 G/DL (ref 11.5–15.4)
IMM GRANULOCYTES # BLD AUTO: 0.09 THOUSAND/UL (ref 0–0.2)
IMM GRANULOCYTES NFR BLD AUTO: 1 % (ref 0–2)
LYMPHOCYTES # BLD AUTO: 1.96 THOUSANDS/ÂΜL (ref 0.6–4.47)
LYMPHOCYTES NFR BLD AUTO: 32 % (ref 14–44)
MCH RBC QN AUTO: 25.8 PG (ref 26.8–34.3)
MCHC RBC AUTO-ENTMCNC: 31.6 G/DL (ref 31.4–37.4)
MCV RBC AUTO: 82 FL (ref 82–98)
MONOCYTES # BLD AUTO: 0.64 THOUSAND/ÂΜL (ref 0.17–1.22)
MONOCYTES NFR BLD AUTO: 10 % (ref 4–12)
NEUTROPHILS # BLD AUTO: 3.42 THOUSANDS/ÂΜL (ref 1.85–7.62)
NEUTS SEG NFR BLD AUTO: 55 % (ref 43–75)
NRBC BLD AUTO-RTO: 1 /100 WBCS
PLATELET # BLD AUTO: 93 THOUSANDS/UL (ref 149–390)
POTASSIUM SERPL-SCNC: 3.2 MMOL/L (ref 3.5–5.3)
PROT SERPL-MCNC: 7.2 G/DL (ref 6.4–8.4)
RBC # BLD AUTO: 3.37 MILLION/UL (ref 3.81–5.12)
SODIUM SERPL-SCNC: 141 MMOL/L (ref 135–147)
WBC # BLD AUTO: 6.22 THOUSAND/UL (ref 4.31–10.16)

## 2023-11-16 PROCEDURE — 85025 COMPLETE CBC W/AUTO DIFF WBC: CPT | Performed by: INTERNAL MEDICINE

## 2023-11-16 PROCEDURE — 80053 COMPREHEN METABOLIC PANEL: CPT | Performed by: INTERNAL MEDICINE

## 2023-11-16 NOTE — PROGRESS NOTES
Pt presents for central labs. Port accessed, positive blood return noted, labs collected per protocol. Pt declined AVS, aware of future appts.

## 2023-11-16 NOTE — TELEPHONE ENCOUNTER
Called pt to move her 11/29 appt with DR buitrago to later in the day.  No answer, VM message left requesting call back

## 2023-11-17 ENCOUNTER — TELEPHONE (OUTPATIENT)
Dept: FAMILY MEDICINE CLINIC | Facility: CLINIC | Age: 68
End: 2023-11-17

## 2023-11-17 ENCOUNTER — TELEPHONE (OUTPATIENT)
Dept: HEMATOLOGY ONCOLOGY | Facility: CLINIC | Age: 68
End: 2023-11-17

## 2023-11-17 NOTE — TELEPHONE ENCOUNTER
I am reaching out regarding your appointment with Dr. Milana Carrillo on 11/29/23. There has been a change to the providers schedule, and we will need to reschedule your appointment. I left a voicemail explaining to patient that this appointment will need to be rescheduled due to a change in the providers schedule. Patient was advised to call [facility] [phone] to reschedule.

## 2023-11-17 NOTE — TELEPHONE ENCOUNTER
Pt needs refill, doesn't any refill last visit back in 10/23 - patient also stated that she's been leaving messages      anastrozole (ARIMIDEX) 1 mg tablet      FOLIC ACID PO     VITAMIN D PO

## 2023-11-17 NOTE — TELEPHONE ENCOUNTER
Returning patient's call regarding refills for Arimidex, Vitamin B 12 and Folic acid. Unable to reach and left voicemail stating that med refills should be routed to prescribing provider- Oncology.

## 2023-11-20 ENCOUNTER — PATIENT OUTREACH (OUTPATIENT)
Dept: HEMATOLOGY ONCOLOGY | Facility: CLINIC | Age: 68
End: 2023-11-20

## 2023-11-20 ENCOUNTER — HOSPITAL ENCOUNTER (OUTPATIENT)
Dept: INFUSION CENTER | Facility: CLINIC | Age: 68
Discharge: HOME | End: 2023-11-20
Payer: MEDICARE

## 2023-11-20 VITALS
SYSTOLIC BLOOD PRESSURE: 129 MMHG | BODY MASS INDEX: 40.51 KG/M2 | RESPIRATION RATE: 18 BRPM | HEIGHT: 65 IN | DIASTOLIC BLOOD PRESSURE: 84 MMHG | WEIGHT: 243.17 LBS | TEMPERATURE: 97.7 F | HEART RATE: 98 BPM

## 2023-11-20 DIAGNOSIS — C78.7 METASTATIC COLON CANCER TO LIVER (HCC): Primary | ICD-10-CM

## 2023-11-20 DIAGNOSIS — C18.9 METASTATIC COLON CANCER TO LIVER (HCC): Primary | ICD-10-CM

## 2023-11-20 PROCEDURE — G0498 CHEMO EXTEND IV INFUS W/PUMP: HCPCS

## 2023-11-20 PROCEDURE — 96367 TX/PROPH/DG ADDL SEQ IV INF: CPT

## 2023-11-20 PROCEDURE — 96417 CHEMO IV INFUS EACH ADDL SEQ: CPT

## 2023-11-20 PROCEDURE — 96411 CHEMO IV PUSH ADDL DRUG: CPT

## 2023-11-20 PROCEDURE — 96415 CHEMO IV INFUSION ADDL HR: CPT

## 2023-11-20 PROCEDURE — 96413 CHEMO IV INFUSION 1 HR: CPT

## 2023-11-20 PROCEDURE — 96368 THER/DIAG CONCURRENT INF: CPT

## 2023-11-20 RX ORDER — FLUOROURACIL 50 MG/ML
400 INJECTION, SOLUTION INTRAVENOUS ONCE
Status: COMPLETED | OUTPATIENT
Start: 2023-11-20 | End: 2023-11-20

## 2023-11-20 RX ORDER — POTASSIUM CHLORIDE 14.9 MG/ML
20 INJECTION INTRAVENOUS ONCE
Status: COMPLETED | OUTPATIENT
Start: 2023-11-20 | End: 2023-11-20

## 2023-11-20 RX ORDER — SODIUM CHLORIDE 9 MG/ML
20 INJECTION, SOLUTION INTRAVENOUS ONCE AS NEEDED
Status: DISCONTINUED | OUTPATIENT
Start: 2023-11-20 | End: 2023-11-23 | Stop reason: HOSPADM

## 2023-11-20 RX ORDER — POTASSIUM CHLORIDE 14.9 MG/ML
20 INJECTION INTRAVENOUS ONCE
Status: CANCELLED
Start: 2023-11-20

## 2023-11-20 RX ORDER — DEXTROSE MONOHYDRATE 50 MG/ML
20 INJECTION, SOLUTION INTRAVENOUS ONCE
Status: COMPLETED | OUTPATIENT
Start: 2023-11-20 | End: 2023-11-20

## 2023-11-20 RX ADMIN — FLUOROURACIL 895 MG: 50 INJECTION, SOLUTION INTRAVENOUS at 13:25

## 2023-11-20 RX ADMIN — LEUCOVORIN CALCIUM 900 MG: 200 INJECTION, POWDER, LYOPHILIZED, FOR SOLUTION INTRAMUSCULAR; INTRAVENOUS at 10:48

## 2023-11-20 RX ADMIN — DEXTROSE 20 ML/HR: 5 SOLUTION INTRAVENOUS at 10:47

## 2023-11-20 RX ADMIN — SODIUM CHLORIDE 240 MG: 9 INJECTION, SOLUTION INTRAVENOUS at 09:52

## 2023-11-20 RX ADMIN — POTASSIUM CHLORIDE 20 MEQ: 14.9 INJECTION, SOLUTION INTRAVENOUS at 11:09

## 2023-11-20 RX ADMIN — DEXAMETHASONE SODIUM PHOSPHATE: 10 INJECTION, SOLUTION INTRAMUSCULAR; INTRAVENOUS at 09:09

## 2023-11-20 RX ADMIN — OXALIPLATIN 200 MG: 100 INJECTION, SOLUTION, CONCENTRATE INTRAVENOUS at 10:48

## 2023-11-20 RX ADMIN — SODIUM CHLORIDE 20 ML/HR: 9 INJECTION, SOLUTION INTRAVENOUS at 09:09

## 2023-11-20 NOTE — PROGRESS NOTES
Free RX opportunity. Left VM with contact information. Will F/U. Mirna Braxton MPH  YFZHH:[phone]  Email: [email or web address]

## 2023-11-20 NOTE — PROGRESS NOTES
Pt presents for opdivo, oxaliplatin, leucovorin, 5FU push, 5FU BRANDIE. No new meds or concerns. Pt tolerated infusion without adverse reaction. BRANDIE connected per protocol. Pt is aware of trouble shooting, when to call [facility] and the office. Pt is aware of when to return for d/c. Future appointments discussed. AVS given.

## 2023-11-21 ENCOUNTER — TELEPHONE (OUTPATIENT)
Dept: HEMATOLOGY ONCOLOGY | Facility: CLINIC | Age: 68
End: 2023-11-21

## 2023-11-21 NOTE — TELEPHONE ENCOUNTER
I called [facility] regarding an appointment that they have scheduled with Dr. Oumou Carrillo scheduled on  11/29/23      I left a voicemail explaining to patient that this appointment will need to be rescheduled due to a change in the providers schedule. Patient was advised to call [facility] to reschedule.   Another attempt will be made to reschedule

## 2023-11-22 ENCOUNTER — OFFICE VISIT (OUTPATIENT)
Dept: FAMILY MEDICINE CLINIC | Facility: CLINIC | Age: 68
End: 2023-11-22

## 2023-11-22 ENCOUNTER — HOSPITAL ENCOUNTER (OUTPATIENT)
Dept: INFUSION CENTER | Facility: CLINIC | Age: 68
Discharge: HOME | End: 2023-11-22
Payer: MEDICARE

## 2023-11-22 ENCOUNTER — TELEPHONE (OUTPATIENT)
Dept: HEMATOLOGY ONCOLOGY | Facility: CLINIC | Age: 68
End: 2023-11-22

## 2023-11-22 VITALS
BODY MASS INDEX: 41.48 KG/M2 | HEIGHT: 65 IN | DIASTOLIC BLOOD PRESSURE: 98 MMHG | SYSTOLIC BLOOD PRESSURE: 136 MMHG | RESPIRATION RATE: 20 BRPM | OXYGEN SATURATION: 96 % | HEART RATE: 97 BPM | TEMPERATURE: 97.8 F | WEIGHT: 249 LBS

## 2023-11-22 VITALS
HEART RATE: 116 BPM | TEMPERATURE: 96 F | RESPIRATION RATE: 18 BRPM | DIASTOLIC BLOOD PRESSURE: 86 MMHG | SYSTOLIC BLOOD PRESSURE: 152 MMHG

## 2023-11-22 DIAGNOSIS — Z17.0 MALIGNANT NEOPLASM OF UPPER-OUTER QUADRANT OF RIGHT BREAST IN FEMALE, ESTROGEN RECEPTOR POSITIVE: ICD-10-CM

## 2023-11-22 DIAGNOSIS — R94.8 ABNORMAL PET SCAN OF COLON: ICD-10-CM

## 2023-11-22 DIAGNOSIS — C18.9 METASTATIC COLON CANCER TO LIVER (HCC): Primary | ICD-10-CM

## 2023-11-22 DIAGNOSIS — C50.411 MALIGNANT NEOPLASM OF UPPER-OUTER QUADRANT OF RIGHT BREAST IN FEMALE, ESTROGEN RECEPTOR POSITIVE: ICD-10-CM

## 2023-11-22 DIAGNOSIS — C78.7 METASTATIC COLON CANCER TO LIVER (HCC): Primary | ICD-10-CM

## 2023-11-22 DIAGNOSIS — C18.9 METASTATIC COLON CANCER TO LIVER (HCC): ICD-10-CM

## 2023-11-22 DIAGNOSIS — E55.9 VITAMIN D DEFICIENCY: ICD-10-CM

## 2023-11-22 DIAGNOSIS — C78.7 METASTATIC COLON CANCER TO LIVER (HCC): ICD-10-CM

## 2023-11-22 DIAGNOSIS — I10 PRIMARY HYPERTENSION: Primary | ICD-10-CM

## 2023-11-22 PROBLEM — Z09 FOLLOW-UP EXAMINATION: Status: RESOLVED | Noted: 2023-04-04 | Resolved: 2023-11-22

## 2023-11-22 PROBLEM — Z11.4 ENCOUNTER FOR SCREENING FOR HIV: Status: RESOLVED | Noted: 2022-07-07 | Resolved: 2023-11-22

## 2023-11-22 PROCEDURE — 99213 OFFICE O/P EST LOW 20 MIN: CPT | Performed by: STUDENT IN AN ORGANIZED HEALTH CARE EDUCATION/TRAINING PROGRAM

## 2023-11-22 PROCEDURE — 3080F DIAST BP >= 90 MM HG: CPT | Performed by: STUDENT IN AN ORGANIZED HEALTH CARE EDUCATION/TRAINING PROGRAM

## 2023-11-22 PROCEDURE — 3075F SYST BP GE 130 - 139MM HG: CPT | Performed by: STUDENT IN AN ORGANIZED HEALTH CARE EDUCATION/TRAINING PROGRAM

## 2023-11-22 RX ORDER — FOLIC ACID 1 MG/1
1 TABLET ORAL DAILY
Qty: 90 TABLET | Refills: 3 | Status: SHIPPED | OUTPATIENT
Start: 2023-11-22

## 2023-11-22 RX ORDER — ANASTROZOLE 1 MG/1
1 TABLET ORAL DAILY
Qty: 30 TABLET | Refills: 0 | Status: SHIPPED | OUTPATIENT
Start: 2023-11-22

## 2023-11-22 RX ORDER — OMEPRAZOLE 20 MG/1
20 CAPSULE, DELAYED RELEASE ORAL DAILY
Qty: 30 CAPSULE | Refills: 5 | Status: SHIPPED | OUTPATIENT
Start: 2023-11-22

## 2023-11-22 RX ORDER — ERGOCALCIFEROL 1.25 MG/1
50000 CAPSULE ORAL WEEKLY
Qty: 90 CAPSULE | Refills: 3 | Status: SHIPPED | OUTPATIENT
Start: 2023-11-22

## 2023-11-22 RX ADMIN — PEGFILGRASTIM 6 MG: 6 INJECTION SUBCUTANEOUS at 13:08

## 2023-11-22 RX ADMIN — SODIUM CHLORIDE 1000 ML: 0.9 INJECTION, SOLUTION INTRAVENOUS at 12:57

## 2023-11-22 NOTE — PROGRESS NOTES
Name: Thelma Lozano      : 1955      MRN: [id number]  Encounter Provider: Sherry Queen MD  Encounter Date: 2023   Encounter department: [address] [facility]     1. Primary hypertension  Assessment & Plan:  BP today: 136/98; not within goal  Previously 129/84  No dose change today  Admits poor diet but good med adherence  Currently on Losartan 50 mg daily    - Continue current meds  - Discussed low sodium diet and especially consuming green leafy vegetables  - Follow up in 4 weeks      2. Malignant neoplasm of upper-outer quadrant of right breast in female, estrogen receptor positive   Assessment & Plan:  Patient requesting med refill for Arimidex  No recent follow up with breast onco given current metastatic colon cancer to liver and treatments receiving    Plan:  - Will contact Oncologist to discuss tx continuation and required follow up. - Will follow up with patient afterwards  - Refill for 4 weeks provided today. Orders:  -     anastrozole (ARIMIDEX) 1 mg tablet; Take 1 tablet (1 mg total) by mouth daily  -     Mammo diagnostic bilateral w cad; Future; Expected date: 2023    3. Vitamin D deficiency  -     ergocalciferol (VITAMIN D2) 50,000 units; Take 1 capsule (50,000 Units total) by mouth once a week    4. Metastatic colon cancer to liver (HCC)  -     folic acid (FOLVITE) 1 mg tablet; Take 1 tablet (1 mg total) by mouth in the morning    5. Abnormal PET scan of colon  -     omeprazole (PriLOSEC) 20 mg delayed release capsule; Take 1 capsule (20 mg total) by mouth daily           Subjective        Thelma Lozano is a 80 y.o. 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 Patient is accompanied by her daughter. No acute complaints. Review of Systems   Constitutional:  Positive for fatigue. Negative for chills and fever. HENT:  Negative for sore throat. Eyes:  Negative for visual disturbance. Respiratory:  Negative for cough and shortness of breath. Cardiovascular:  Negative for chest pain and palpitations. Gastrointestinal:  Negative for abdominal pain and vomiting. Genitourinary:  Negative for dysuria and hematuria. Skin:  Negative for color change and rash. Neurological:  Positive for dizziness. Psychiatric/Behavioral:  The patient is nervous/anxious. All other systems reviewed and are negative. Current Outpatient Medications on File Prior to Visit   Medication Sig    atorvastatin (LIPITOR) 40 mg tablet Take 1 tablet (40 mg total) by mouth daily at bedtime    losartan (COZAAR) 50 mg tablet Take 1 tablet (50 mg total) by mouth daily    Cyanocobalamin (B-12 PO) Take by mouth    docusate sodium (COLACE) 50 mg capsule Take 1 capsule (50 mg total) by mouth 2 (two) times a day    fluorouracil 5,375 mg in CADD/Elastomeric Infusion Device Infuse 5,375 mg (1,200 mg/m2/day x 2.24 m2) into a catheter in a vein via infusion device over 46 hours for 2 days  Infusion planned for November 20, 2023.     lidocaine-prilocaine (EMLA) cream Apply topically as needed for mild pain    Magnesium Oxide 400 MG CAPS Take 1 tablet (400 mg total) by mouth 2 (two) times a day    ondansetron (ZOFRAN) 8 mg tablet Take 1 tablet (8 mg total) by mouth every 8 (eight) hours as needed for nausea or vomiting    potassium chloride (K-DUR,KLOR-CON) 20 mEq tablet Take 1 tablet (20 mEq total) by mouth 2 (two) times a day    prochlorperazine (COMPAZINE) 10 mg tablet Take 1 tablet (10 mg total) by mouth every 6 (six) hours as needed for nausea or vomiting    VITAMIN D PO Take 50,000 Units by mouth once a week    [DISCONTINUED] anastrozole (ARIMIDEX) 1 mg tablet Take 1 tablet (1 mg total) by mouth daily    [DISCONTINUED] FOLIC ACID PO Take 1 mg by mouth in the morning    [DISCONTINUED] omeprazole (PriLOSEC) 20 mg delayed release capsule Take 1 capsule (20 mg total) by mouth daily       Objective     /98 (BP Location: Left arm, Patient Position: Sitting, Cuff Size: Standard)   Pulse 97   Temp 97.8 °F (36.6 °C) (Temporal)   Resp 20   Ht 5' 5" (1.651 m)   Wt 113 kg (249 lb)   SpO2 96%   BMI 41.44 kg/m²     Physical Exam  Vitals reviewed. Constitutional:       General: She is not in acute distress. Appearance: She is obese. She is not toxic-appearing. HENT:      Head: Normocephalic. Nose: Nose normal.      Mouth/Throat:      Mouth: Mucous membranes are moist.   Eyes:      General: No scleral icterus. Extraocular Movements: Extraocular movements intact. Neck:      Comments: Right anterior cervical LAD. Painless mass. Cardiovascular:      Rate and Rhythm: Normal rate and regular rhythm. Pulses: Normal pulses. Heart sounds: Normal heart sounds. No murmur heard. No friction rub. No gallop. Pulmonary:      Effort: Pulmonary effort is normal. No respiratory distress. Breath sounds: Normal breath sounds. No wheezing. Abdominal:      Palpations: Abdomen is soft. There is no mass. Tenderness: There is no abdominal tenderness. There is no rebound. Musculoskeletal:      Right lower leg: No edema. Left lower leg: No edema. Lymphadenopathy:      Cervical: Cervical adenopathy present. Skin:     General: Skin is warm. Neurological:      General: No focal deficit present. Mental Status: She is alert and oriented to person, place, and time.    Psychiatric:         Behavior: Behavior normal.     Yun Concepcion MD

## 2023-11-22 NOTE — TELEPHONE ENCOUNTER
Appointment Change  Cancel, Reschedule, Change to Virtual      Who are you speaking with? LVM for patient   If it is not the patient, is the caller listed on the communication consent form? N/A   Which provider is the appointment scheduled with? Dr. Catarina Chamorro   When was the original appointment scheduled? Please list date and time 11/29/23 10AM   At which location is the appointment scheduled to take place? [id number]   Was the appointment rescheduled? Was the appointment changed from an in person visit to a virtual visit? If so, please list the details of the change. No, LVM informing patient of cancellation and advised to call and reschedule   What is the reason for the appointment change?  Provider

## 2023-11-22 NOTE — PATIENT INSTRUCTIONS
You need an appointment for your Medicare Annual Wellness program as well as screenings for breast and cervical cancers.

## 2023-11-22 NOTE — PROGRESS NOTES
Patient arrived to unit without complaint. Patient had 5FU BRANDIE disconnected after 47 hours and 8 minutes. Patient tolerated hydration and Neulasta SC to right arm without incident. AVS declined and patient aware of next appointment. Patient left in stable condition.

## 2023-11-22 NOTE — ASSESSMENT & PLAN NOTE
BP today: 136/98; not within goal  Previously 129/84  No dose change today  Admits poor diet but good med adherence  Currently on Losartan 50 mg daily    - Continue current meds  - Discussed low sodium diet and especially consuming green leafy vegetables  - Follow up in 4 weeks

## 2023-11-22 NOTE — ASSESSMENT & PLAN NOTE
Patient requesting med refill for Arimidex  No recent follow up with breast onco given current metastatic colon cancer to liver and treatments receiving    Plan:  - Will contact Oncologist to discuss tx continuation and required follow up. - Will follow up with patient afterwards  - Refill for 4 weeks provided today.

## 2023-11-29 DIAGNOSIS — C18.9 METASTATIC COLON CANCER TO LIVER (HCC): Primary | ICD-10-CM

## 2023-11-29 DIAGNOSIS — C78.7 METASTATIC COLON CANCER TO LIVER (HCC): Primary | ICD-10-CM

## 2023-12-01 ENCOUNTER — HOSPITAL ENCOUNTER (OUTPATIENT)
Dept: INFUSION CENTER | Facility: CLINIC | Age: 68
End: 2023-12-01
Payer: MEDICARE

## 2023-12-01 ENCOUNTER — TELEPHONE (OUTPATIENT)
Dept: HEMATOLOGY ONCOLOGY | Facility: CLINIC | Age: 68
End: 2023-12-01

## 2023-12-01 ENCOUNTER — OFFICE VISIT (OUTPATIENT)
Dept: HEMATOLOGY ONCOLOGY | Facility: CLINIC | Age: 68
End: 2023-12-01
Payer: MEDICARE

## 2023-12-01 ENCOUNTER — HOSPITAL ENCOUNTER (OUTPATIENT)
Dept: INFUSION CENTER | Facility: HOSPITAL | Age: 68
End: 2023-12-01

## 2023-12-01 VITALS
SYSTOLIC BLOOD PRESSURE: 128 MMHG | HEART RATE: 98 BPM | WEIGHT: 241 LBS | TEMPERATURE: 97.6 F | DIASTOLIC BLOOD PRESSURE: 60 MMHG | BODY MASS INDEX: 40.15 KG/M2 | OXYGEN SATURATION: 99 % | RESPIRATION RATE: 18 BRPM | HEIGHT: 65 IN

## 2023-12-01 DIAGNOSIS — C78.7 METASTATIC COLON CANCER TO LIVER (HCC): Primary | ICD-10-CM

## 2023-12-01 DIAGNOSIS — C18.9 METASTATIC COLON CANCER TO LIVER (HCC): Primary | ICD-10-CM

## 2023-12-01 LAB
ALBUMIN SERPL BCP-MCNC: 3.6 G/DL (ref 3.5–5)
ALP SERPL-CCNC: 163 U/L (ref 34–104)
ALT SERPL W P-5'-P-CCNC: 36 U/L (ref 7–52)
ANION GAP SERPL CALCULATED.3IONS-SCNC: 10 MMOL/L
ANISOCYTOSIS BLD QL SMEAR: PRESENT
AST SERPL W P-5'-P-CCNC: 55 U/L (ref 13–39)
BASOPHILS # BLD MANUAL: 0 THOUSAND/UL (ref 0–0.1)
BASOPHILS NFR MAR MANUAL: 0 % (ref 0–1)
BILIRUB SERPL-MCNC: 0.61 MG/DL (ref 0.2–1)
BUN SERPL-MCNC: 12 MG/DL (ref 5–25)
CALCIUM SERPL-MCNC: 9.1 MG/DL (ref 8.4–10.2)
CHLORIDE SERPL-SCNC: 106 MMOL/L (ref 96–108)
CO2 SERPL-SCNC: 23 MMOL/L (ref 21–32)
CREAT SERPL-MCNC: 1.4 MG/DL (ref 0.6–1.3)
EOSINOPHIL # BLD MANUAL: 0.13 THOUSAND/UL (ref 0–0.4)
EOSINOPHIL NFR BLD MANUAL: 1 % (ref 0–6)
ERYTHROCYTE [DISTWIDTH] IN BLOOD BY AUTOMATED COUNT: 24.6 % (ref 11.6–15.1)
GFR SERPL CREATININE-BSD FRML MDRD: 38 ML/MIN/1.73SQ M
GLUCOSE SERPL-MCNC: 138 MG/DL (ref 65–140)
HCT VFR BLD AUTO: 28.2 % (ref 34.8–46.1)
HGB BLD-MCNC: 8.7 G/DL (ref 11.5–15.4)
LYMPHOCYTES # BLD AUTO: 1.38 THOUSAND/UL (ref 0.6–4.47)
LYMPHOCYTES # BLD AUTO: 11 % (ref 14–44)
MCH RBC QN AUTO: 26.4 PG (ref 26.8–34.3)
MCHC RBC AUTO-ENTMCNC: 30.9 G/DL (ref 31.4–37.4)
MCV RBC AUTO: 86 FL (ref 82–98)
MONOCYTES # BLD AUTO: 1.26 THOUSAND/UL (ref 0–1.22)
MONOCYTES NFR BLD: 10 % (ref 4–12)
MYELOCYTES NFR BLD MANUAL: 1 % (ref 0–1)
NEUTROPHILS # BLD MANUAL: 9.69 THOUSAND/UL (ref 1.85–7.62)
NEUTS BAND NFR BLD MANUAL: 2 % (ref 0–8)
NEUTS SEG NFR BLD AUTO: 75 % (ref 43–75)
PLATELET # BLD AUTO: 98 THOUSANDS/UL (ref 149–390)
PLATELET BLD QL SMEAR: ABNORMAL
PMV BLD AUTO: 10.9 FL (ref 8.9–12.7)
POTASSIUM SERPL-SCNC: 3.9 MMOL/L (ref 3.5–5.3)
PROT SERPL-MCNC: 7.5 G/DL (ref 6.4–8.4)
RBC # BLD AUTO: 3.3 MILLION/UL (ref 3.81–5.12)
SODIUM SERPL-SCNC: 139 MMOL/L (ref 135–147)
TSH SERPL DL<=0.05 MIU/L-ACNC: 4.25 UIU/ML (ref 0.45–4.5)
WBC # BLD AUTO: 12.59 THOUSAND/UL (ref 4.31–10.16)

## 2023-12-01 PROCEDURE — 80053 COMPREHEN METABOLIC PANEL: CPT | Performed by: INTERNAL MEDICINE

## 2023-12-01 PROCEDURE — 85027 COMPLETE CBC AUTOMATED: CPT | Performed by: INTERNAL MEDICINE

## 2023-12-01 PROCEDURE — 99214 OFFICE O/P EST MOD 30 MIN: CPT | Performed by: INTERNAL MEDICINE

## 2023-12-01 PROCEDURE — 85007 BL SMEAR W/DIFF WBC COUNT: CPT | Performed by: INTERNAL MEDICINE

## 2023-12-01 PROCEDURE — 84443 ASSAY THYROID STIM HORMONE: CPT | Performed by: INTERNAL MEDICINE

## 2023-12-01 NOTE — TELEPHONE ENCOUNTER
Would it be possible to change patient's appt. At the infusion center On 12/29/23 from [facility] to the [address] [place] [address]. Patient is scheduled to see Dr. Usman Bennett at 9:40 AM and due to using [facility], this would be more convenient. The rescheduled appt. could be either before or after Dr. Héctor Joseph appt. If scheduling after, please allow at least an hour for the appt. Thank you!

## 2023-12-03 NOTE — PROGRESS NOTES
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   - grade 2  - %  - IN 95%  - HER2 negative     2018 Surgery    Right partial mastectomy and SLN biopsy:  Infiltrating ductal carcinoma, Grade 2/3, felt to be between 2.0 cm and 2.5 cm in greatest dimension  - No invasive tumor is seen at inked margins, but there is a focus of DCIS seen within approximately 1mm of the inked medial margin  - no LVI  - no LCIS  - 0/2 LN's  at least Stage IIA - pT2, pN0, cM0, G2.    - Dr Michelle Scott     2018 -  Cancer Staged    Stage IIA - pT2, pN0, cM0, G2.       2019 Genomic Testing    Oncotype DX score: 13     2019 -  Hormone Therapy    anastrozole 1 mg daily as adjuvant endocrine therapy    - Dr Salvador Koch       2019 - 4/3/2019 Radiation    Course: C1    Plan ID Energy Fractions Dose per Fraction (cGy) Dose Correction (cGy) Total Dose Delivered (cGy) Elapsed Days   R Breast 10X/6X 25 / 25 200 0 5,000 40   R BRST BOOST 16E 5 / 5 200 0 1,000 7      Treatment dates:  C1: 2019 - 4/3/2019       Metastatic colon cancer to liver ([address])   2023 Genetic Testing    CARIS Results:   KRAS Pathogenic Variant Exon 2  p.G12D : lack of benefit of cetuximab, panitumumab Level 2   No other actionable mutation; MSI stable; TMB low   MiFOLOXAi Results: "Decreased Benefit of FOLFOX + Bevacizumab in first line metastatic CRC. This patient may achieve improved results by receiving an alternative to FOLFOX, such as FOLFIRI, as their initial regimen. As an adjustment to frontline FOLFOXIRI following toxicity: This patient may achieve improved results by removing the oxaliplatin portion of their regimen".          7/11/2023 Initial Diagnosis    Metastatic colon cancer to liver ([address])     7/13/2023 -  Cancer Staged    Staging form: Colon and Rectum, AJCC 8th Edition  - Clinical stage from 7/13/2023: cT3, cN0, pM1 - Signed by María Washington DO on 9/28/2023  Total positive nodes: 0       7/31/2023 -  Chemotherapy    potassium chloride, 20 mEq, Intravenous, Once, 2 of 2 cycles  Administration: 20 mEq (11/6/2023), 20 mEq (11/20/2023)  alteplase (CATHFLO), 2 mg, Intracatheter, Every 1 Minute as needed, 9 of 15 cycles  pegfilgrastim (NEULASTA), 6 mg, Subcutaneous, Once, 3 of 9 cycles  Administration: 6 mg (10/25/2023), 6 mg (11/8/2023), 6 mg (11/22/2023)  fluorouracil (ADRUCIL), 895 mg, Intravenous, Once, 9 of 15 cycles  Administration: 900 mg (7/31/2023), 900 mg (8/14/2023), 900 mg (8/28/2023), 900 mg (9/11/2023), 900 mg (9/25/2023), 900 mg (10/9/2023), 900 mg (10/23/2023), 895 mg (11/6/2023), 895 mg (11/20/2023)  nivolumab (OPDIVO) IVPB, 240 mg (100 % of original dose 240 mg), Intravenous, Once, 1 of 7 cycles  Dose modification: 240 mg (original dose 240 mg, Cycle 9)  Administration: 240 mg (11/20/2023)  leucovorin calcium IVPB, 896 mg, Intravenous, Once, 9 of 15 cycles  Administration: 900 mg (7/31/2023), 900 mg (8/14/2023), 900 mg (8/28/2023), 900 mg (9/11/2023), 900 mg (9/25/2023), 900 mg (10/9/2023), 900 mg (10/23/2023), 900 mg (11/6/2023), 900 mg (11/20/2023)  oxaliplatin (ELOXATIN) chemo infusion, 85 mg/m2 = 190.4 mg, Intravenous, Once, 9 of 15 cycles  Administration: 190.4 mg (7/31/2023), 190.4 mg (8/14/2023), 200 mg (8/28/2023), 200 mg (9/11/2023), 200 mg (9/25/2023), 200 mg (10/9/2023), 200 mg (10/23/2023), 200 mg (11/6/2023), 200 mg (11/20/2023)  fluorouracil (ADRUCIL) ambulatory infusion Soln, 1,200 mg/m2/day = 5,375 mg, Intravenous, Over 46 hours, 9 of 15 cycles     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[address])   7/27/2023 Initial Diagnosis    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[address])     7/27/2023 -  Cancer Staged    Staging form: Pharynx - Oropharynx, AJCC 8th Edition  - Clinical stage from 7/27/2023: Stage III (cT1, cN3, cM0, p16+) - Signed by Dianna Serrano MD on 7/27/2023  Stage prefix: Initial diagnosis           Interval History: Patient presents for follow up of her metastatic colon cancer and at least 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 On November 20 she started nivolumab with her FOLFOX. She has noticed since that cycle she has been a bit more tired. She has had more neuropathy with cold exposure. She also feels like at times she has an ataxic gait. She has not had any falls. Her lightheadedness has gotten a bit better. She has not gotten an appointment with cardiology yet. Her appetite is decreased and she has lost 5 more pounds since her last visit. She denies any signs of fevers or infections. Her right-sided neck lymphadenopathy continues to slowly decrease. She denies any diarrhea. No new rashes. REVIEW OF SYSTEMS:  Please note that a 14-point review of systems was performed to include Constitutional, HEENT, Respiratory, CVS, GI, , Musculoskeletal, Integumentary, Neurologic, Rheumatologic, Endocrinologic, Psychiatric, Lymphatic, and Hematologic/Oncologic systems were reviewed and are negative unless otherwise stated in HPI.  Positive and negative findings pertinent to this evaluation are incorporated into the history of present illness. ECOG PS: 1    PROBLEM LIST:  Patient Active Problem List   Diagnosis    Primary hypertension    Vasomotor rhinitis    Mixed hyperlipidemia    Screen for colon cancer    Malignant neoplasm of right female breast (HCC)    Vitamin D deficiency    Prediabetes    Cervical lymphadenopathy    Right thyroid nodule    GERD (gastroesophageal reflux disease)    Obesity    Metastatic colon cancer to liver (HCC)    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HCC)    Poor appetite    Anemia    Body mass index (BMI) 40.0-44.9, adult (HCC)    Dehydration    Drug-induced constipation    Chemotherapy induced neutropenia        Assessment / Plan: 67-year-old female with metastatic colon cancer and at least a 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 Overall she is tolerated the addition of Opdivo well and does not have any obvious autoimmune side effects. We will watch for her TSH because she is having more fatigue. She is getting her labs done today. On exam there is no obvious neurologic findings. I do not see a reason to MRI her brain but my threshold to do this will be low if any of the symptoms of feeling off balance get worse. We will also see what happened with her cardiology referral.  I gave her Ensure samples today and asked her to try to get an extra 1-2 in of the Ensure a day. Every time I see her she loses some around 5 pounds. I do not want to see her start to get weak and malnourished and then unable to take treatment. I will have her see my nurse practitioner in 2 weeks and I will see her 2 weeks after that prior to her cycles of chemotherapy. She knows to call in the interim with any questions or concerns. I spent 30 minutes on chart review, face to face counseling time, coordination of care and documentation.     Past Medical History:   has a past medical history of Anemia, Arthritis, Breast cancer ([address]) (12/17/2018), Cancer [facility]), Encounter for screening for HIV (07/07/2022), Follow-up examination (04/04/2023), GERD (gastroesophageal reflux disease), Hyperlipidemia, Hypertension, Obesity, Stroke [facility]), Stroke ([facility]), and Transaminitis (09/22/2021). PAST SURGICAL HISTORY:   has a past surgical history that includes US guided breast biopsy right complete (Right, 11/23/2018); Breast surgery; pr mastectomy partial w/axillary lymphadenectomy (Right, 12/20/2018); Tubal ligation; Breast biopsy (Right, 11/23/2018); US guided injection for research study (12/20/2018); US guided injection for research study (12/7/2018); US guided injection for research study (5/3/2019); US guided lymph node biopsy right (5/26/2023); IR biopsy liver mass (6/27/2023); pr laryngoscopy w/biopsy microscope/telescope (N/A, 7/20/2023); pr [facility] incl fluor gdnce dx w/cell washg spx (N/A, 7/20/2023); ESOPHAGOSCOPY (N/A, 7/20/2023); and IR port placement (7/28/2023).     CURRENT MEDICATIONS  Current Outpatient Medications   Medication Sig Dispense Refill    anastrozole (ARIMIDEX) 1 mg tablet Take 1 tablet (1 mg total) by mouth daily 30 tablet 0    atorvastatin (LIPITOR) 40 mg tablet Take 1 tablet (40 mg total) by mouth daily at bedtime 30 tablet 2    Cyanocobalamin (B-12 PO) Take by mouth      docusate sodium (COLACE) 50 mg capsule Take 1 capsule (50 mg total) by mouth 2 (two) times a day 90 capsule 1    ergocalciferol (VITAMIN D2) 50,000 units Take 1 capsule (50,000 Units total) by mouth once a week 90 capsule 3    [START ON 12/4/2023] fluorouracil 5,375 mg in CADD/Elastomeric Infusion Device Infuse 5,375 mg (1,200 mg/m2/day x 2.24 m2) into a catheter in a vein via infusion device over 46 hours for 2 days  Infusion planned for December 4, 1214. 1 each 0    folic acid (FOLVITE) 1 mg tablet Take 1 tablet (1 mg total) by mouth in the morning 90 tablet 3    lidocaine-prilocaine (EMLA) cream Apply topically as needed for mild pain 30 g 3    losartan (COZAAR) 50 mg tablet Take 1 tablet (50 mg total) by mouth daily 90 tablet 1    omeprazole (PriLOSEC) 20 mg delayed release capsule Take 1 capsule (20 mg total) by mouth daily 30 capsule 5    ondansetron (ZOFRAN) 8 mg tablet Take 1 tablet (8 mg total) by mouth every 8 (eight) hours as needed for nausea or vomiting 30 tablet 3    potassium chloride (K-DUR,KLOR-CON) 20 mEq tablet Take 1 tablet (20 mEq total) by mouth 2 (two) times a day 60 tablet 1    prochlorperazine (COMPAZINE) 10 mg tablet Take 1 tablet (10 mg total) by mouth every 6 (six) hours as needed for nausea or vomiting 30 tablet 3    VITAMIN D PO Take 50,000 Units by mouth once a week      Magnesium Oxide 400 MG CAPS Take 1 tablet (400 mg total) by mouth 2 (two) times a day (Patient not taking: Reported on 12/1/2023) 14 capsule 0     No current facility-administered medications for this visit. Facility-Administered Medications Ordered in Other Visits   Medication Dose Route Frequency Provider Last Rate Last Admin    alteplase (CATHFLO) injection 2 mg  2 mg Intracatheter Q1MIN PRN Jossie Jesus DO         [unfilled]    SOCIAL HISTORY:   reports that she has never smoked. She has never been exposed to tobacco smoke. She has never used smokeless tobacco. She reports that she does not drink alcohol and does not use drugs. FAMILY HISTORY:  family history includes Colon cancer (age of onset: 62) in her mother; Hypertension in her daughter, mother, and son; Pancreatic cancer (age of onset: 61) in her father. ALLERGIES:  has No Known Allergies. Physical Exam:  Vital Signs:   Visit Vitals  /60 (BP Location: Left arm, Patient Position: Sitting, Cuff Size: Adult)   Pulse 98   Temp 97.6 °F (36.4 °C) (Temporal)   Resp 18   Ht 5' 5" (1.651 m)   Wt 109 kg (241 lb)   SpO2 99%   BMI 40.10 kg/m²   OB Status Postmenopausal   Smoking Status Never   BSA 2.14 m²     Body mass index is 40.1 kg/m². Body surface area is 2.14 meters squared.     GEN: Alert, awake oriented x3, in no acute distress  HEENT- No pallor, icterus, cyanosis, no oral mucosal lesions,   LAD - no palpable cervical, clavicle, axillary, inguinal LAD  Heart- normal S1 S2, regular rate and rhythm, No murmur, rubs.    Lungs- clear breathing sound bilateral.   Abdomen- soft, Non tender, bowel sounds present  Extremities- No cyanosis, clubbing, edema  Neuro- No focal neurological deficit    Labs:  Lab Results   Component Value Date    WBC 12.59 (H) 12/01/2023    HGB 8.7 (L) 12/01/2023    HCT 28.2 (L) 12/01/2023    MCV 86 12/01/2023    PLT 98 (L) 12/01/2023     Lab Results   Component Value Date    SODIUM 139 12/01/2023    K 3.9 12/01/2023     12/01/2023    CO2 23 12/01/2023    AGAP 10 12/01/2023    BUN 12 12/01/2023    CREATININE 1.40 (H) 12/01/2023    GLUC 138 12/01/2023    GLUF 110 (H) 08/19/2023    CALCIUM 9.1 12/01/2023    AST 55 (H) 12/01/2023    ALT 36 12/01/2023    ALKPHOS 163 (H) 12/01/2023    TP 7.5 12/01/2023    TBILI 0.61 12/01/2023    EGFR 38 12/01/2023

## 2023-12-04 ENCOUNTER — NUTRITION (OUTPATIENT)
Dept: NUTRITION | Facility: CLINIC | Age: 68
End: 2023-12-04

## 2023-12-04 ENCOUNTER — HOSPITAL ENCOUNTER (OUTPATIENT)
Dept: INFUSION CENTER | Facility: CLINIC | Age: 68
Discharge: HOME | End: 2023-12-04
Payer: MEDICARE

## 2023-12-04 VITALS
BODY MASS INDEX: 40.55 KG/M2 | RESPIRATION RATE: 18 BRPM | WEIGHT: 243.39 LBS | HEART RATE: 106 BPM | DIASTOLIC BLOOD PRESSURE: 78 MMHG | SYSTOLIC BLOOD PRESSURE: 115 MMHG | HEIGHT: 65 IN | TEMPERATURE: 97.4 F

## 2023-12-04 DIAGNOSIS — C78.7 METASTATIC COLON CANCER TO LIVER (HCC): Primary | ICD-10-CM

## 2023-12-04 DIAGNOSIS — Z71.3 NUTRITIONAL COUNSELING: Primary | ICD-10-CM

## 2023-12-04 DIAGNOSIS — C18.9 METASTATIC COLON CANCER TO LIVER (HCC): Primary | ICD-10-CM

## 2023-12-04 PROCEDURE — 96367 TX/PROPH/DG ADDL SEQ IV INF: CPT

## 2023-12-04 PROCEDURE — 96417 CHEMO IV INFUS EACH ADDL SEQ: CPT

## 2023-12-04 PROCEDURE — 96413 CHEMO IV INFUSION 1 HR: CPT

## 2023-12-04 PROCEDURE — 96415 CHEMO IV INFUSION ADDL HR: CPT

## 2023-12-04 PROCEDURE — 96411 CHEMO IV PUSH ADDL DRUG: CPT

## 2023-12-04 PROCEDURE — G0498 CHEMO EXTEND IV INFUS W/PUMP: HCPCS

## 2023-12-04 PROCEDURE — 96368 THER/DIAG CONCURRENT INF: CPT

## 2023-12-04 RX ORDER — FLUOROURACIL 50 MG/ML
400 INJECTION, SOLUTION INTRAVENOUS ONCE
Status: COMPLETED | OUTPATIENT
Start: 2023-12-04 | End: 2023-12-04

## 2023-12-04 RX ORDER — DEXTROSE MONOHYDRATE 50 MG/ML
20 INJECTION, SOLUTION INTRAVENOUS ONCE
Status: COMPLETED | OUTPATIENT
Start: 2023-12-04 | End: 2023-12-04

## 2023-12-04 RX ORDER — SODIUM CHLORIDE 9 MG/ML
20 INJECTION, SOLUTION INTRAVENOUS ONCE AS NEEDED
Status: DISCONTINUED | OUTPATIENT
Start: 2023-12-04 | End: 2023-12-07 | Stop reason: HOSPADM

## 2023-12-04 RX ADMIN — OXALIPLATIN 190.4 MG: 5 INJECTION, SOLUTION INTRAVENOUS at 10:17

## 2023-12-04 RX ADMIN — FLUOROURACIL 895 MG: 50 INJECTION, SOLUTION INTRAVENOUS at 12:23

## 2023-12-04 RX ADMIN — DEXTROSE 20 ML/HR: 5 SOLUTION INTRAVENOUS at 10:13

## 2023-12-04 RX ADMIN — LEUCOVORIN CALCIUM 900 MG: 500 INJECTION, POWDER, LYOPHILIZED, FOR SOLUTION INTRAMUSCULAR; INTRAVENOUS at 10:15

## 2023-12-04 RX ADMIN — SODIUM CHLORIDE 20 ML/HR: 0.9 INJECTION, SOLUTION INTRAVENOUS at 09:06

## 2023-12-04 RX ADMIN — DEXAMETHASONE SODIUM PHOSPHATE: 10 INJECTION, SOLUTION INTRAMUSCULAR; INTRAVENOUS at 09:06

## 2023-12-04 RX ADMIN — SODIUM CHLORIDE 240 MG: 9 INJECTION, SOLUTION INTRAVENOUS at 09:31

## 2023-12-04 NOTE — PROGRESS NOTES
Outpatient Oncology Nutrition Consultation   Type of Consult: Follow Up  Care Location: [facility] [place]    Reason for referral: Received notification by [facility] RN, Vicki Dodson, on 8/1/23 that pt has triggered for oncology nutrition care (reason for referral: HNC dx and Malnutrition Screening Tool (MST) Triggers: scored a 0 indicating No recent wt loss and is not eating poorly due to a decreased appetite. (Date of MST: 8/1/23)). 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   - grade 2  - %  - LA 95%  - HER2 negative     12/20/2018 Surgery    Right partial mastectomy and SLN biopsy:  Infiltrating ductal carcinoma, Grade 2/3, felt to be between 2.0 cm and 2.5 cm in greatest dimension  - No invasive tumor is seen at inked margins, but there is a focus of DCIS seen within approximately 1mm of the inked medial margin  - no LVI  - no LCIS  - 0/2 LN's  at least Stage IIA - pT2, pN0, cM0, G2.    - Dr Fred Robins     12/20/2018 -  Cancer Staged    Stage IIA - pT2, pN0, cM0, G2.       1/4/2019 Genomic Testing    Oncotype DX score: 13     2/1/2019 -  Hormone Therapy    anastrozole 1 mg daily as adjuvant endocrine therapy    - Dr Seth Fox       2/14/2019 - 4/3/2019 Radiation    Course: C1    Plan ID Energy Fractions Dose per Fraction (cGy) Dose Correction (cGy) Total Dose Delivered (cGy) Elapsed Days   R Breast 10X/6X 25 / 25 200 0 5,000 40   R BRST BOOST 16E 5 / 5 200 0 1,000 7      Treatment dates:  C1: 2/14/2019 - 4/3/2019       Metastatic colon cancer to liver (HCC)   7/6/2023 Genetic Testing    CARIS Results:   KRAS Pathogenic Variant Exon 2  p.G12D : lack of benefit of cetuximab, panitumumab Level 2   No other actionable mutation; MSI stable; TMB low   MiFOLOXAi Results: "Decreased Benefit of FOLFOX + Bevacizumab in first line metastatic CRC. This patient may achieve improved results by receiving an alternative to FOLFOX, such as FOLFIRI, as their initial regimen. As an adjustment to frontline FOLFOXIRI following toxicity: This patient may achieve improved results by removing the oxaliplatin portion of their regimen".          7/11/2023 Initial Diagnosis    Metastatic colon cancer to liver ([address])     7/13/2023 -  Cancer Staged    Staging form: Colon and Rectum, AJCC 8th Edition  - Clinical stage from 7/13/2023: cT3, cN0, pM1 - Signed by Abiola Rojas DO on 9/28/2023  Total positive nodes: 0       7/31/2023 -  Chemotherapy    potassium chloride, 20 mEq, Intravenous, Once, 2 of 2 cycles  Administration: 20 mEq (11/6/2023), 20 mEq (11/20/2023)  alteplase (CATHFLO), 2 mg, Intracatheter, Every 1 Minute as needed, 10 of 15 cycles  pegfilgrastim (NEULASTA), 6 mg, Subcutaneous, Once, 4 of 9 cycles  Administration: 6 mg (10/25/2023), 6 mg (11/8/2023), 6 mg (11/22/2023)  fluorouracil (ADRUCIL), 895 mg, Intravenous, Once, 10 of 15 cycles  Administration: 900 mg (7/31/2023), 900 mg (8/14/2023), 900 mg (8/28/2023), 900 mg (9/11/2023), 900 mg (9/25/2023), 900 mg (10/9/2023), 900 mg (10/23/2023), 895 mg (11/6/2023), 895 mg (11/20/2023)  nivolumab (OPDIVO) IVPB, 240 mg (100 % of original dose 240 mg), Intravenous, Once, 2 of 7 cycles  Dose modification: 240 mg (original dose 240 mg, Cycle 9)  Administration: 240 mg (11/20/2023)  leucovorin calcium IVPB, 896 mg, Intravenous, Once, 10 of 15 cycles  Administration: 900 mg (7/31/2023), 900 mg (8/14/2023), 900 mg (8/28/2023), 900 mg (9/11/2023), 900 mg (9/25/2023), 900 mg (10/9/2023), 900 mg (10/23/2023), 900 mg (11/6/2023), 900 mg (11/20/2023)  oxaliplatin (ELOXATIN) chemo infusion, 85 mg/m2 = 190.4 mg, Intravenous, Once, 10 of 15 cycles  Administration: 190.4 mg (7/31/2023), 190.4 mg (8/14/2023), 200 mg (8/28/2023), 200 mg (9/11/2023), 200 mg (9/25/2023), 200 mg (10/9/2023), 200 mg (10/23/2023), 200 mg (11/6/2023), 200 mg (11/20/2023)  fluorouracil (ADRUCIL) ambulatory infusion Soln, 1,200 mg/m2/day = 5,375 mg, Intravenous, Over 46 hours, 10 of 15 cycles     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[address])   7/27/2023 Initial Diagnosis    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[address])     7/27/2023 -  Cancer Staged    Staging form: Pharynx - Oropharynx, AJCC 8th Edition  - Clinical stage from 7/27/2023: Stage III (cT1, cN3, cM0, p16+) - Signed by Ganesh Watkins MD on 7/27/2023  Stage prefix: Initial diagnosis         Past Medical & Surgical Hx:   Patient Active Problem List   Diagnosis    Primary hypertension    Vasomotor rhinitis    Mixed hyperlipidemia    Screen for colon cancer    Malignant neoplasm of right female breast ([address])    Vitamin D deficiency    Prediabetes    Cervical lymphadenopathy    Right thyroid nodule    GERD (gastroesophageal reflux disease)    Obesity    Metastatic colon cancer to liver (HCC)    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HCC)    Poor appetite    Anemia    Body mass index (BMI) 40.0-44.9, adult (HCC)    Dehydration    Drug-induced constipation    Chemotherapy induced neutropenia      Past Medical History:   Diagnosis Date Anemia     Arthritis     Breast cancer ([address]) 12/17/2018    Cancer ([address])     right breast, colon, liver, right tonsil    Encounter for screening for HIV 07/07/2022    Follow-up examination 04/04/2023    GERD (gastroesophageal reflux disease)     Hyperlipidemia     Hypertension     Obesity     Stroke ([address])     TIA     Stroke ([address])     TIA     Transaminitis 09/22/2021     Past Surgical History:   Procedure Laterality Date    BREAST BIOPSY Right 11/23/2018    us guided bx cancer    BREAST SURGERY      ESOPHAGOSCOPY N/A 7/20/2023    Procedure: ESOPHAGOSCOPY;  Surgeon: Enma Pham MD;  [facility];  Service: ENT    IR BIOPSY LIVER MASS  6/27/2023    IR PORT PLACEMENT  7/28/2023     [address] INCL FLUOR GDNCE DX W/CELL WASHG [facility] N/A 7/20/2023    Procedure: BRONCHOSCOPY;  Surgeon: Enma Pham MD;  [facility];  Service: ENT    LA LARYNGOSCOPY W/BIOPSY MICROSCOPE/TELESCOPE N/A 7/20/2023    Procedure: MICRODIRECT LARYNGOSCOPY WITH BIOPSY;  Surgeon: Enma Pham MD;  [facility];  Service: ENT    LA MASTECTOMY PARTIAL W/AXILLARY LYMPHADENECTOMY Right 12/20/2018    Procedure: ULTRASOUND LOCALIZED PARTIAL MASTECTOMY W/SENTINEL NODE BIOPSY POSS AXILLARY DISSECTION;  Surgeon: Ron Taylor MD;  Location:  MAIN OR;  Service: General    TUBAL LIGATION      US GUIDED BREAST BIOPSY RIGHT COMPLETE Right 11/23/2018    US GUIDED INJECTION FOR RESEARCH STUDY  12/20/2018    US GUIDED INJECTION FOR RESEARCH STUDY  12/7/2018    US GUIDED INJECTION FOR RESEARCH STUDY  5/3/2019    US GUIDED LYMPH NODE BIOPSY RIGHT  5/26/2023       Review of Medications:   Vitamins, Supplements and Herbals: No, pt denies taking supplements    Current Outpatient Medications:     anastrozole (ARIMIDEX) 1 mg tablet, Take 1 tablet (1 mg total) by mouth daily, Disp: 30 tablet, Rfl: 0    atorvastatin (LIPITOR) 40 mg tablet, Take 1 tablet (40 mg total) by mouth daily at bedtime, Disp: 30 tablet, Rfl: 2 Cyanocobalamin (B-12 PO), Take by mouth, Disp: , Rfl:     docusate sodium (COLACE) 50 mg capsule, Take 1 capsule (50 mg total) by mouth 2 (two) times a day, Disp: 90 capsule, Rfl: 1    ergocalciferol (VITAMIN D2) 50,000 units, Take 1 capsule (50,000 Units total) by mouth once a week, Disp: 90 capsule, Rfl: 3    fluorouracil 5,375 mg in CADD/Elastomeric Infusion Device, Infuse 5,375 mg (1,200 mg/m2/day x 2.24 m2) into a catheter in a vein via infusion device over 46 hours for 2 days  Infusion planned for December 4, 2023., Disp: 1 each, Rfl: 0    folic acid (FOLVITE) 1 mg tablet, Take 1 tablet (1 mg total) by mouth in the morning, Disp: 90 tablet, Rfl: 3    lidocaine-prilocaine (EMLA) cream, Apply topically as needed for mild pain, Disp: 30 g, Rfl: 3    losartan (COZAAR) 50 mg tablet, Take 1 tablet (50 mg total) by mouth daily, Disp: 90 tablet, Rfl: 1    Magnesium Oxide 400 MG CAPS, Take 1 tablet (400 mg total) by mouth 2 (two) times a day (Patient not taking: Reported on 12/1/2023), Disp: 14 capsule, Rfl: 0    omeprazole (PriLOSEC) 20 mg delayed release capsule, Take 1 capsule (20 mg total) by mouth daily, Disp: 30 capsule, Rfl: 5    ondansetron (ZOFRAN) 8 mg tablet, Take 1 tablet (8 mg total) by mouth every 8 (eight) hours as needed for nausea or vomiting, Disp: 30 tablet, Rfl: 3    potassium chloride (K-DUR,KLOR-CON) 20 mEq tablet, Take 1 tablet (20 mEq total) by mouth 2 (two) times a day, Disp: 60 tablet, Rfl: 1    prochlorperazine (COMPAZINE) 10 mg tablet, Take 1 tablet (10 mg total) by mouth every 6 (six) hours as needed for nausea or vomiting, Disp: 30 tablet, Rfl: 3    VITAMIN D PO, Take 50,000 Units by mouth once a week, Disp: , Rfl:   No current facility-administered medications for this visit.     Facility-Administered Medications Ordered in Other Visits:     alteplase (CATHFLO) injection 2 mg, 2 mg, Intracatheter, Q1MIN PRN, Danitza Bruton,     fluorouracil (ADRUCIL) injection 895 mg, 400 mg/m2 (Treatment Plan Recorded), Intravenous, Once, [place], DO    leucovorin 900 mg in dextrose 5 % 250 mL IVPB, 900 mg, Intravenous, Once, [place] Sprout, DO, Last Rate: 170 mL/hr at 12/04/23 1015, 900 mg at 12/04/23 1015    oxaliplatin (ELOXATIN) 190.4 mg in dextrose 5 % 250 mL chemo infusion, 85 mg/m2 (Treatment Plan Recorded), Intravenous, Once, [place] Sprout, DO, Last Rate: 144 mL/hr at 12/04/23 1017, 190.4 mg at 12/04/23 1017    sodium chloride 0.9 % infusion, 20 mL/hr, Intravenous, Once PRN, [place] Sprout, DO, Last Rate: 20 mL/hr at 12/04/23 0906, 20 mL/hr at 12/04/23 0906    Most Recent Lab Results:   Lab Results   Component Value Date    WBC 12.59 (H) 12/01/2023    NEUTROABS 3.42 11/16/2023    IRON 27 (L) 10/20/2023    TIBC 327 10/20/2023    FERRITIN 139 10/20/2023    CHOLESTEROL 222 (H) 06/13/2023    TRIG 140 06/13/2023    HDL 44 (L) 06/13/2023    LDLCALC 150 (H) 06/13/2023    ALT 36 12/01/2023    AST 55 (H) 12/01/2023    ALB 3.6 12/01/2023    SODIUM 139 12/01/2023    SODIUM 141 11/16/2023    K 3.9 12/01/2023    K 3.2 (L) 11/16/2023     12/01/2023    BUN 12 12/01/2023    BUN 8 11/16/2023    CREATININE 1.40 (H) 12/01/2023    CREATININE 0.72 11/16/2023    EGFR 38 12/01/2023    POCGLU 91 10/12/2023    GLUF 110 (H) 08/19/2023    GLUF 113 (H) 07/24/2023    GLUC 138 12/01/2023    HGBA1C 5.9 (H) 06/13/2023    HGBA1C 6.1 (H) 07/09/2022    HGBA1C 6.1 (H) 09/16/2021    CALCIUM 9.1 12/01/2023    FOLATE >20.0 (H) 05/23/2019    MG 1.8 (L) 09/11/2023       Anthropometric Measurements:   Height: 66"  Ht Readings from Last 1 Encounters:   12/04/23 5' 5" (1.651 m)     Wt Readings from Last 20 Encounters:   12/04/23 110 kg (243 lb 6.2 oz)   12/01/23 109 kg (241 lb)   11/22/23 113 kg (249 lb)   11/20/23 110 kg (243 lb 2.7 oz)   11/06/23 112 kg (247 lb 12.8 oz)   11/03/23 112 kg (246 lb)   10/27/23 115 kg (253 lb)   10/24/23 115 kg (253 lb)   10/23/23 115 kg (252 lb 10.4 oz)   10/17/23 114 kg (251 lb 3.2 oz) 10/09/23 116 kg (255 lb 11.7 oz)   10/09/23 117 kg (257 lb)   10/06/23 115 kg (253 lb)   09/25/23 116 kg (256 lb 13.4 oz)   09/22/23 115 kg (253 lb 3.2 oz)   09/11/23 119 kg (261 lb 7.5 oz)   09/08/23 117 kg (258 lb 9.6 oz)   08/28/23 118 kg (261 lb 0.4 oz)   08/23/23 115 kg (254 lb)   08/22/23 116 kg (255 lb)       Weight History:   Usual Weight: 275#  Varian: (2/22/19) 264.6#, (4/2/19) 263.4  Home Scale: (somtime in july) 276#    Oncology Nutrition-Anthropometrics      Flowsheet Row Nutrition from 12/4/2023 in [facility] Nutrition from 11/6/2023 in [facility]   Patient age (years): 80 years 80 years   Patient (female) height (in): 77 in 77 in   Current Weight to be used for anthropometric calculations (lbs) 243.4 lbs 247.8 lbs   Current Weight to be used for anthropometric calculations (kg) 110.6 kg 112.6 kg   BMI: 39.3 40   IBW female: 130 lbs 130 lbs   IBW (kg) female: 59.1 kg 59.1 kg   IBW % (female) 187.2 % 190.6 %   Adjusted BW (female): 158.4 lbs 159.5 lbs   Adjusted BW kg (female): 72 kg 72.5 kg   % weight change after 1 month: -1.1 % -2.1 %   Weight change after 1 month (lbs) -2.6 lbs -5.2 lbs   % weight change after 3 months: -5.9 % -5 %   Weight change after 3 months (lbs) -15.2 lbs -13 lbs            Nutrition-Focused Physical Findings: none observed    Food/Nutrition-Related History & Client/Social History:    Current Nutrition Impact Symptoms:  [] Nausea  [x] Reduced Appetite -decreased week of chemo, starts to improve the week after [] Acid Reflux    [] Vomiting  [x] Unintended Wt Loss [] Malabsorption    [] Diarrhea  [] Unintended Wt Gain  [] Dumping Syndrome    [x] Constipation -colace and dulcolax.  -Bms usually every other day [] Thick Mucous/Secretions  [] Abdominal Pain    [x] Dysgeusia (Altered Taste) -the only thing she's been able to taste lately has been OJ [x] Xerostomia (Dry Mouth)  [] Gas    [] Dysosmia (Altered Smell)  [] Richy   [] Difficulty Chewing    [] Oral Mucositis (Sore Mouth)  [x] Fatigue  [] Hyperglycemia   Lab Results   Component Value Date    HGBA1C 5.9 (H) 2023      [] Odynophagia  [] Esophagitis  [] Other:    [] Dysphagia -swallow study 8/15/23- regular diet/thin liquids [x] Early Satiety  [] No Problems Eating      Food Allergies & Intolerances: no    Current Diet: Regular Diet, No Restrictions  Current Nutrition Intake: Decreased since last visit  Appetite: Fair , Poor  Nutrition Route: PO  Oral Care: brushes daily  Activity level: ADLs, very fatigued so activity is limited     24 Hr Diet Recall:   Breakfast: 1 egg, cottage cheese, and 2% milk  Snack: Ensure complete  Lunch: salad, hard boiled egg, cottage cheese and avocado  Snack: string cheese   Dinner: chicken parm, broccoli, mashed potatoes  Snack: applesauce    Beverages: eletrolyte water (33oz x 1.5), tea (8oz x2), orange juice (8oz x1-2)  Supplements:   Ensure Complete (10 oz, 350 kcal, 30 g pro) tries to drink daily      Oncology Nutrition-Estimated Needs      Flowsheet Row Nutrition from 2023 in [address] [facility]   Weight type used Adjusted weight   Weight in kilograms (kg) used for estimated needs 74.1 kg   Energy needs formula:  30-35 kcal/kg   Energy needs based on 30 kcal/k kcal   Energy needs based on 35 kcal/k kcal   Protein needs formula: 1.2-1.5 g/kg   Protein needs based on 1.2 g/k g   Protein needs based on 1.5 g/kg 111 g   Fluid needs formula: 30-35 mL/kg   Fluid needs based on 30 mL/kg 2223 mL   Fluid needs in ounces 75 oz   Fluid needs based on 35 mL/kg 2594 mL   Fluid needs in ounces 88 oz             Discussion & Intervention:   Dalton Crocker was evaluated today for an RD follow up regarding unintended weight loss and poor po intake. Dalton Crocker is currently undergoing tx for metastatic colon cancer . Met with Maira today during her infusion.  She reports appetite has been poor. It's worse the week of chemo, but then starts to slightly improve. She continues to lose weight. She is trying to get in 3 meals/day but her portions are smaller. She is drinking Ensure complete shakes daily. Encouraged her to increase these to twice daily. Also reviewed calorie/protein dense foods and adding more snacks between meals since meals are smaller. Reviewed 24 hour recall, which revealed an inadequate po intake, and discussed ways to increase kcal, protein, and fluid intakes and optimize nutrient intake. Also reviewed the importance of wt management throughout the tx process and the role of a high kcal/ high protein diet in managing wt and overall health.   Based on today's assessment, discussion included: MNT for: dysgeusia, xerostomia, decreased appetite, early satiety, hydration, constipation, how to modify foods for anticipated nutrition impact symptoms pt may experience during CA tx, practicing proper oral care, a high kcal/protein diet & food choices to include at all meals & snacks (Examples of high kcal foods: cheese, full-fat dairy products, nut butter, plant-based fats, coconut oil/milk, avocado, butter, cream soup, etc. Examples of high protein foods: eggs, chicken, fish, beans/legumes, nuts/nut butters, bone broth, etc.) , fortifying foods for added kcal and protein (examples include: adding cheese to foods such as eggs, mashed potatoes, casseroles, etc.; Making oatmeal with whole milk rather than water; Making fortified mashed potatoes with cream, butter, dry milk powder, plain [place] yogurt, and cheese.), adequate hydration & fluid choices, sipping on calorie containing beverages (examples include: adding whole milk or cream to coffee, oral nutrition supplements, juice, electrolyte replacement beverages, milk, etc.), eating smaller more frequent meals every 2-3 hours (5-6 small meals/day), eating when feeling most hungry, increasing oral nutrition supplements, and adding extra fat to foods while cooking such as butter, plant-based oil, coconut oil/milk, avocado, nut butters, etc..   Moving forward, Maira was encouraged to increase kcal, protein, and fluid intakes and will practice MNT for nutrition impact sx management   Materials Provided: not applicable  All questions and concerns addressed during today’s visit. Jas Castro has RD contact information. Nutrition Diagnosis:   Inadequate Energy Intake related to physiological causes, disease state and treatment related issues as evidenced by food recall, wt loss and discussion with pt and/or family. Increased Nutrient Needs (kcal & pro) related to increased demand for nutrients and disease state as evidenced by cancer dx and pt undergoing tx for cancer. Monitoring & Evaluation:   Goals:  weight maintenance/stabilization  adequate nutrition impact symptom management  pt to meet >/=75% estimated nutrition needs daily    Progress Towards Goals: Progressing    Nutrition Rx & Recommendations:  Diet: High Calorie, High Protein (for high calorie foods see pages 52-53, and for high protein foods see pages 49-51 in your Eating Hints book)  Keep a daily food journal (pen/paper, akua such as [employer]). Nutrition Supplements: increase Ensure complete shakes to twice daily. May use homemade shakes/smoothies as desired. If using a pre-made shake/bar, choose ones with >300 calories and >10 grams protein (ex. Ensure Complete, Ensure Enlive, Ensure Plus, Boost Plus, Boost Very High Calorie, etc.). Small, frequent meals/snacks may be easier to tolerate than 3 large daily meals. Aim for 5-6 small meals per day (every 2-3 hours). Include protein at all meals/snacks. Include a variety of foods (as tolerated/allowed by diet). Incorporate physical activity as able/allowed. Stay hydrated by sipping fluids of choice/tolerance throughout the day. Liquid nutrition may be better tolerated than solids at times.   Alter food choices and eating patterns to accommodate changing needs. Refer to Eating Hints book for other meal/snack ideas and symptom management. Follow proper oral care; Try baking soda/salt water rinse recipe (mix 3/4 tsp salt + 1 tsp baking soda + 1 qt water; rinse with plain water after using) in Eating Hints book (pg 18). Brush your teeth before/after meals & before bed. For lack of taste: Practice good oral care. Blend fresh fruits into shakes, ice cream or yogurt. Eat frozen fruits: grapes, chopped cantaloupe, watermelon. Select fresh vegetables (may be more appealing than canned/frozen). Add extra flavor to foods: experiment with spices, salt & sweeteners, extra oil/butter, acidic foods; marinate meats (see pages 29-30 in your Eating Hints book). For appetite loss: try powdered or liquid nutrition supplements; eating by the clock every 2-3 hours; set a timer to remind yourself to eat, keep snacks nearby; add extra protein & calories to your diet; drink liquids in between meals, choose liquids that add calories; eat a bedtime snack; eat soft, cool or frozen foods; eat a larger meal when you feel well & rested; only sip small amounts of liquids during meals (see pages 10-12 in your Eating Hints book).   For weight loss: monitor your weight at home at least 2x/week, record your weight, start by adding 250-500 extra calories per day, eat 5-6 small scheduled meals every 2-3 hrs, choose foods that are high in protein and calories (see pages 49-53 in your Eating Hints book), drink liquids with calories for example: milkshakes/smoothies/juice/soup/whole milk/chocolate milk, cook with protein fortified milk (see recipe on page 36 in your Eating Hints book), consider ready-to-drink oral nutrition supplements such as Ensure Plus, Ensure Enlive, Boost Plus, or Boost Very High Calorie, avoid "diet" and "light" foods when possible, avoid drinking too much with meals, contact your dietitian with any continued weight loss over the course of 1 week.  For more info see pages 35-37 in your Eating Hints book. For constipation: drink plenty of fluids (>64 oz/day); drink hot liquids; eat high-fiber foods as tolerated (whole grains, beans, peas, nuts, seeds, fruits, vegetables, etc); increase physical activity as tolerated. Avoid increasing fiber intake too quickly, add fiber into your diet slowly; keep a record of your bowel movements (see pages 13-14 in you Eating Hints book). For dry mouth: sip water throughout the day; try very sweet (or tart, as tolerated) drinks; chew gum or suck on hard candy, popsicles, & ice chips; eat easy to swallow foods (such as pureed cooked foods or soups); moisten food with sauce/gravy/salad dressing; do not drink beer, wine, or any type of alcohol; keep lips moist with lip balm; avoid tobacco products & second-hand smoke; try Biotene as needed (see pages 17-18 in your Eating Hints book). Follow proper oral care; Try baking soda/salt water rinse recipe (mix 3/4 tsp salt + 1 tsp baking soda + 1 qt water; rinse with pain water after using) in Eating Hints book (pg 18). Brush your teeth before/after meals & before bed. Weigh yourself regularly. If you notice weight loss, make an effort to increase your daily food/calorie intake. If you continue to notice loss after these efforts, reach out to your dietitian to establish a plan to stabilize weight. Consider trying "Apple/Prune Sauce" recipe on page 14 of your Eating Hints booklet. Be sure to drink 8 oz of water after taking 1-2 tbsp of the mixture before bedtime. Overly with the F.A.S.S. Concept - add extra Fat, Acidity (as long as you do not have mouth or throat pain), Salt, and Sweetness to foods to help improve flavor.   Try using greek yogurt instead of regular  Add extra olive oil, butter, cheese, avocado, sauces/gravies etc for more calories  Snack ideas:  [place] yogurt  Cottage cheese and fruit  Nuts or nut butter and crackers  Soup or bone broth  Egg chicken or tuna salad   Oral nutrition supplement  1/2 sandwich    Follow Up Plan:  1/2/23 during infusion  Recommend Referral to Other Providers: none at this time

## 2023-12-04 NOTE — PROGRESS NOTES
Pt tolerated treatment without incident. [facility] BRANDIE connected per protocol. Pt aware of disconnect date and time. Provided with AVS, STAR made aware of appt time on Wednesday.

## 2023-12-04 NOTE — PATIENT INSTRUCTIONS
Nutrition Rx & Recommendations:  Diet: High Calorie, High Protein (for high calorie foods see pages 52-53, and for high protein foods see pages 49-51 in your Eating Hints book)  Keep a daily food journal (pen/paper, akua such as [employer]). Nutrition Supplements: increase Ensure complete shakes to twice daily. May use homemade shakes/smoothies as desired. If using a pre-made shake/bar, choose ones with >300 calories and >10 grams protein (ex. Ensure Complete, Ensure Enlive, Ensure Plus, Boost Plus, Boost Very High Calorie, etc.). Small, frequent meals/snacks may be easier to tolerate than 3 large daily meals. Aim for 5-6 small meals per day (every 2-3 hours). Include protein at all meals/snacks. Include a variety of foods (as tolerated/allowed by diet). Incorporate physical activity as able/allowed. Stay hydrated by sipping fluids of choice/tolerance throughout the day. Liquid nutrition may be better tolerated than solids at times. Alter food choices and eating patterns to accommodate changing needs. Refer to Eating Hints book for other meal/snack ideas and symptom management. Follow proper oral care; Try baking soda/salt water rinse recipe (mix 3/4 tsp salt + 1 tsp baking soda + 1 qt water; rinse with plain water after using) in Eating Hints book (pg 18). Brush your teeth before/after meals & before bed. For lack of taste: Practice good oral care. Blend fresh fruits into shakes, ice cream or yogurt. Eat frozen fruits: grapes, chopped cantaloupe, watermelon. Select fresh vegetables (may be more appealing than canned/frozen). Add extra flavor to foods: experiment with spices, salt & sweeteners, extra oil/butter, acidic foods; marinate meats (see pages 29-30 in your Eating Hints book).   For appetite loss: try powdered or liquid nutrition supplements; eating by the clock every 2-3 hours; set a timer to remind yourself to eat, keep snacks nearby; add extra protein & calories to your diet; drink liquids in between meals, choose liquids that add calories; eat a bedtime snack; eat soft, cool or frozen foods; eat a larger meal when you feel well & rested; only sip small amounts of liquids during meals (see pages 10-12 in your Eating Hints book). For weight loss: monitor your weight at home at least 2x/week, record your weight, start by adding 250-500 extra calories per day, eat 5-6 small scheduled meals every 2-3 hrs, choose foods that are high in protein and calories (see pages 49-53 in your Eating Hints book), drink liquids with calories for example: milkshakes/smoothies/juice/soup/whole milk/chocolate milk, cook with protein fortified milk (see recipe on page 36 in your Eating Hints book), consider ready-to-drink oral nutrition supplements such as Ensure Plus, Ensure Enlive, Boost Plus, or Boost Very High Calorie, avoid "diet" and "light" foods when possible, avoid drinking too much with meals, contact your dietitian with any continued weight loss over the course of 1 week. For more info see pages 35-37 in your Eating Hints book. For constipation: drink plenty of fluids (>64 oz/day); drink hot liquids; eat high-fiber foods as tolerated (whole grains, beans, peas, nuts, seeds, fruits, vegetables, etc); increase physical activity as tolerated. Avoid increasing fiber intake too quickly, add fiber into your diet slowly; keep a record of your bowel movements (see pages 13-14 in you Eating Hints book). For dry mouth: sip water throughout the day; try very sweet (or tart, as tolerated) drinks; chew gum or suck on hard candy, popsicles, & ice chips; eat easy to swallow foods (such as pureed cooked foods or soups); moisten food with sauce/gravy/salad dressing; do not drink beer, wine, or any type of alcohol; keep lips moist with lip balm; avoid tobacco products & second-hand smoke; try Biotene as needed (see pages 17-18 in your Eating Hints book).   Follow proper oral care; Try baking soda/salt water rinse recipe (mix 3/4 tsp salt + 1 tsp baking soda + 1 qt water; rinse with pain water after using) in Eating Hints book (pg 18). Brush your teeth before/after meals & before bed. Weigh yourself regularly. If you notice weight loss, make an effort to increase your daily food/calorie intake. If you continue to notice loss after these efforts, reach out to your dietitian to establish a plan to stabilize weight. Consider trying "Apple/Prune Sauce" recipe on page 14 of your Eating Hints booklet. Be sure to drink 8 oz of water after taking 1-2 tbsp of the mixture before bedtime. [place] with the [employer] - add extra Fat, Acidity (as long as you do not have mouth or throat pain), Salt, and Sweetness to foods to help improve flavor.   Try using greek yogurt instead of regular  Add extra olive oil, butter, cheese, avocado, sauces/gravies etc for more calories  Snack ideas:  Greek yogurt  Cottage cheese and fruit  Nuts or nut butter and crackers  Soup or bone broth  Egg chicken or tuna salad   Oral nutrition supplement  1/2 sandwich    Follow Up Plan: 1/2/23 during infusion  Recommend Referral to Other Providers: none at this time

## 2023-12-05 ENCOUNTER — PATIENT OUTREACH (OUTPATIENT)
Dept: HEMATOLOGY ONCOLOGY | Facility: CLINIC | Age: 68
End: 2023-12-05

## 2023-12-05 NOTE — PROGRESS NOTES
Pt called asking to confirm her appointment time at the infusion center tomorrow 12/6/2023. I confirmed she has a 10:30am appointment. E mail sent to [place]  to confirm her transportation also.

## 2023-12-06 ENCOUNTER — HOSPITAL ENCOUNTER (OUTPATIENT)
Dept: INFUSION CENTER | Facility: CLINIC | Age: 68
Discharge: HOME | End: 2023-12-06
Payer: MEDICARE

## 2023-12-06 VITALS
HEART RATE: 112 BPM | RESPIRATION RATE: 18 BRPM | TEMPERATURE: 97.6 F | SYSTOLIC BLOOD PRESSURE: 123 MMHG | DIASTOLIC BLOOD PRESSURE: 83 MMHG

## 2023-12-06 DIAGNOSIS — C78.7 METASTATIC COLON CANCER TO LIVER (HCC): Primary | ICD-10-CM

## 2023-12-06 DIAGNOSIS — C18.9 METASTATIC COLON CANCER TO LIVER (HCC): Primary | ICD-10-CM

## 2023-12-06 PROCEDURE — 96360 HYDRATION IV INFUSION INIT: CPT

## 2023-12-06 PROCEDURE — 96372 THER/PROPH/DIAG INJ SC/IM: CPT

## 2023-12-06 RX ADMIN — SODIUM CHLORIDE 1000 ML: 0.9 INJECTION, SOLUTION INTRAVENOUS at 10:39

## 2023-12-06 RX ADMIN — PEGFILGRASTIM 6 MG: 6 INJECTION SUBCUTANEOUS at 11:34

## 2023-12-06 NOTE — PROGRESS NOTES
Pt presents for CADD d/c. 5FU elastometric ball appears fully deflated, 46 hour run time completed. Disconnected per protocol. Pt given neulasta and tolerated 1L NSS hydration.  Pt provided with AVS, aware of next infusion appt 12/15 at 1pm.

## 2023-12-10 NOTE — PROGRESS NOTES
Cardio-Oncology Clinic Note    Shellia Dakin 80 y.o. 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 Right sided. S/p XRT  Colon cancer  Dx 2023  Metastatic to liver  Current therapy:  anastrazole  Opdivo (nivolumab) and FOLFOX  (started Nov 6516)    # Diastolic dysfunction on echo  Not unexpected echo finding given age, HTN, obesity  Reports mild MOSHER  Exam - euvolemic  Check BNP  No medical therapy for this unless clinical CHF / volume overload    # lightheadedness  At times positional. But not always (can occur laying down). Lasts a few minutes. ? Hypovolemia. Noted recent bump in Cr. Admits doesn't drink much. Exam - euvolemic  Rec increasing hydration  Given can occur at rest, and given the chemotherapy, if not resolved with hydration, will consider checkin ziopatch next visit    # HTN  Losartan 50mg daily  BP today high but missed the losartan. And prior values recently normal.     Continue to monitor    # HLD  Of note, does have coronary calcifications on CT  On Lipitor 40mg qhs  Last  which is quite high. Recently started compliance with the statin. Consider repeat lipids next visit. # Obesity  BMI 38  Weight loss recommended    # Hx TIA  2017. # CKD 3  Prompted by epic  Not sure this is really chronic as prior values were normal.  More of an GOGO but will follow going forward. # High risk medication use  On immunotherapy - risk of myocarditis which can be life-threatening. Check baseline troponin. On 5-FU - risk of coronary vasospasm. EKG today mild sinus tach, otherwise normal.      Today's Plan Summary:  See above assessment/plan for full details of today's plan.   Briefly,     Check troponin  Check BNP  Improve hydration          Reason For Visit / Chief Complaint:  Referred by Dr. Abhilash Fuller for a new patient visit for positional lightheadedness from sitting to standing and diastolic dysfunction on echo. HPI:   Roxane Yanes is a 80 y.o.  female with history as noted in the problem list and further detailed in the above assessment and plan. Referred by Dr. Haleigh Chowdhury for a new patient visit for positional lightheadedness from sitting to standing and diastolic dysfunction on echo. As above, the patient has a history of breast cancer and more recently colon cancer and squamous cell carcinoma of the tonsil. The patient has been started on immunotherapy and FOLFOX. At the recent oncology visit she reported some positional lightheadedness. An echo was obtained demonstrating diastolic dysfunction. She was referred to cardio-oncology. Today, the patient reports - fatigue is main complaint. Also has lightheadedness. Episodes last a few minutes. Can be when just laying there. Can also be positional.   Describes sx's as a dizziness. Sx's started before chemotherapy but worse after starting. No syncope. No CP. Some SOB. Some MOSHER. No palpitations. No leg edema. No orthopnea or PND. Retired. Used to work in [address] [employer]. Single. 5 kids. Nonsmoker. No ETOH. No drugs. Cardiac Imaging personally reviewed:  EKG 6/2023  NSR    12-12-23  Sinus tachycardia at 104 bpm.  Otherwise, normal EKG. Holter or event monitor    Echo 10/2023  Mild LVH. EF 65% (on my reivew). Grade 1 diastolic dysfunction. Normal RV size and function.   No significant valve disease         JAZMYN    Cardiac MRI    Stress testing    Coronary CTA or [facility]    RHC    CPET              Patient Active Problem List    Diagnosis Date Noted   • Stage 3 chronic kidney disease, unspecified whether stage 3a or 3b CKD ([address]) 12/12/2023   • Chemotherapy induced neutropenia  11/09/2023   • Dehydration 10/06/2023   • Drug-induced constipation 10/06/2023   • Body mass index (BMI) 40.0-44.9, adult ([address]) 09/22/2023   • Anemia 08/24/2023   • Poor appetite 08/03/2023   • 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[address]) 07/27/2023   • Metastatic colon cancer to liver ([address]) 07/11/2023   • GERD (gastroesophageal reflux disease)    • Obesity    • Right thyroid nodule 04/04/2023   • Cervical lymphadenopathy 03/21/2023   • Prediabetes 07/07/2022   • Vitamin D deficiency 01/25/2019   • Malignant neoplasm of right female breast ([address]) 12/20/2018   • Vasomotor rhinitis 08/09/2018   • Mixed hyperlipidemia 08/09/2018   • Screen for colon cancer 08/09/2018   • Primary hypertension 07/27/2017       Past Medical History:   Diagnosis Date   • Anemia    • Arthritis    • Breast cancer ([address]) 12/17/2018   • Cancer ([address])     right breast, colon, liver, right tonsil   • Encounter for screening for HIV 07/07/2022   • Follow-up examination 04/04/2023   • GERD (gastroesophageal reflux disease)    • Hyperlipidemia    • Hypertension    • Obesity    • Stroke [facility])     TIA    • Stroke [facility])     TIA    • Transaminitis 09/22/2021       Review of Systems   Constitutional:  Negative for chills and fever. HENT:  Negative for nosebleeds. Gastrointestinal:  Positive for abdominal pain. Negative for abdominal distention and blood in stool. Neurological:  Positive for dizziness. Negative for syncope. Psychiatric/Behavioral:  Negative for confusion. 14-point ROS completed and negative except as stated above and/or in the HPI.     No Known Allergies    Current Outpatient Medications   Medication Instructions   • anastrozole (ARIMIDEX) 1 mg, Oral, Daily   • atorvastatin (LIPITOR) 40 mg, Oral, Daily at bedtime   • Cyanocobalamin (B-12 PO) Oral   • docusate sodium (COLACE) 50 mg, Oral, 2 times daily   • ergocalciferol (VITAMIN D2) 50,000 Units, Oral, Weekly   • [START ON 12/18/2023] fluorouracil 5,375 mg in CADD/Elastomeric Infusion Device 1,200 mg/m2/day, Intravenous, Over 46 hours   • folic acid (FOLVITE) 1 mg, Oral, Daily   • lidocaine-prilocaine (EMLA) cream Topical, As needed   • losartan (COZAAR) 50 mg, Oral, Daily   • Magnesium Oxide 400 mg, Oral, 2 times daily   • omeprazole (PRILOSEC) 20 mg, Oral, Daily   • ondansetron (ZOFRAN) 8 mg, Oral, Every 8 hours PRN   • potassium chloride (K-DUR,KLOR-CON) 20 mEq tablet 20 mEq, Oral, 2 times daily   • prochlorperazine (COMPAZINE) 10 mg, Oral, Every 6 hours PRN   • VITAMIN D PO 50,000 Units, Weekly       Social History     Socioeconomic History   • Marital status: Single     Spouse name: Not on file   • Number of children: Not on file   • Years of education: Not on file   • Highest education level: Not on file   Occupational History   • Not on file   Tobacco Use   • Smoking status: Never     Passive exposure: Never   • Smokeless tobacco: Never   • Tobacco comments:     NO TOBACCO USE   Vaping Use   • Vaping Use: Never used   Substance and Sexual Activity   • Alcohol use: No   • Drug use: No   • Sexual activity: Not on file   Other Topics Concern   • Not on file   Social History Narrative   • Not on file     Social Determinants of Health     Financial Resource Strain: Low Risk  (10/9/2023)    Overall Financial Resource Strain (CARDIA)    • Difficulty of Paying Living Expenses: Not hard at all   Food Insecurity: No Food Insecurity (10/9/2023)    Hunger Vital Sign    • Worried About Running Out of Food in the Last Year: Never true    • Ran Out of Food in the Last Year: Never true   Transportation Needs: No Transportation Needs (10/9/2023)    PRAPARE - Transportation    • Lack of Transportation (Medical): No    • Lack of Transportation (Non-Medical):  No   Physical Activity: Not on file   Stress: Not on file   Social Connections: Not on file   Intimate Partner Violence: Not on file   Housing Stability: Not on file       Family History   Problem Relation Age of Onset   • Colon cancer Mother 62   • Hypertension Mother    • Pancreatic cancer Father 61   • Hypertension Daughter    • Hypertension Son        Physical Exam:  Blood pressure (!) 158/102, pulse 104, height 5' 5" (1.651 m), weight 106 kg (233 lb), SpO2 99 %, not currently breastfeeding. Body mass index is 38.77 kg/m². Wt Readings from Last 3 Encounters:   12/12/23 106 kg (233 lb)   12/04/23 110 kg (243 lb 6.2 oz)   12/01/23 109 kg (241 lb)     Physical Exam  Vitals reviewed. Constitutional:       General: She is not in acute distress. Appearance: She is not toxic-appearing. HENT:      Head: Normocephalic and atraumatic. Eyes:      General: No scleral icterus. Conjunctiva/sclera: Conjunctivae normal.   Neck:      Vascular: No carotid bruit. Comments: No JVD  Cardiovascular:      Rate and Rhythm: Regular rhythm. Tachycardia present. Heart sounds: No murmur heard. No friction rub. No gallop. Pulmonary:      Breath sounds: Normal breath sounds. No wheezing, rhonchi or rales. Abdominal:      General: There is no distension. Palpations: Abdomen is soft. Tenderness: There is no abdominal tenderness. There is no guarding. Musculoskeletal:      Right lower leg: No edema. Left lower leg: No edema. Skin:     Coloration: Skin is not jaundiced or pale. Findings: No erythema. Neurological:      Mental Status: She is alert. Mental status is at baseline. Psychiatric:         Mood and Affect: Mood normal.         Behavior: Behavior normal.         Labs & Results:  Lab Results   Component Value Date    SODIUM 139 12/01/2023    K 3.9 12/01/2023     12/01/2023    CO2 23 12/01/2023    BUN 12 12/01/2023    CREATININE 1.40 (H) 12/01/2023    GLUC 138 12/01/2023    CALCIUM 9.1 12/01/2023     No results found for: "NTBNP"       Thank you for the opportunity to participate in the care of this patient.     Tami Pastor MD, [facility]  Staff Cardiologist  Director of [address] [place] [address]

## 2023-12-11 DIAGNOSIS — C18.9 METASTATIC COLON CANCER TO LIVER (HCC): Primary | ICD-10-CM

## 2023-12-11 DIAGNOSIS — C78.7 METASTATIC COLON CANCER TO LIVER (HCC): Primary | ICD-10-CM

## 2023-12-12 ENCOUNTER — TELEPHONE (OUTPATIENT)
Dept: HEMATOLOGY ONCOLOGY | Facility: CLINIC | Age: 68
End: 2023-12-12

## 2023-12-12 ENCOUNTER — OFFICE VISIT (OUTPATIENT)
Dept: HEMATOLOGY ONCOLOGY | Facility: CLINIC | Age: 68
End: 2023-12-12
Payer: MEDICARE

## 2023-12-12 ENCOUNTER — OFFICE VISIT (OUTPATIENT)
Dept: CARDIOLOGY CLINIC | Facility: CLINIC | Age: 68
End: 2023-12-12
Payer: MEDICARE

## 2023-12-12 VITALS
DIASTOLIC BLOOD PRESSURE: 102 MMHG | HEIGHT: 65 IN | BODY MASS INDEX: 38.82 KG/M2 | OXYGEN SATURATION: 99 % | HEART RATE: 104 BPM | WEIGHT: 233 LBS | SYSTOLIC BLOOD PRESSURE: 158 MMHG

## 2023-12-12 VITALS
OXYGEN SATURATION: 92 % | RESPIRATION RATE: 18 BRPM | WEIGHT: 234 LBS | BODY MASS INDEX: 38.99 KG/M2 | TEMPERATURE: 97 F | HEART RATE: 62 BPM | HEIGHT: 65 IN | SYSTOLIC BLOOD PRESSURE: 140 MMHG | DIASTOLIC BLOOD PRESSURE: 90 MMHG

## 2023-12-12 DIAGNOSIS — C78.7 METASTATIC COLON CANCER TO LIVER (HCC): Primary | ICD-10-CM

## 2023-12-12 DIAGNOSIS — C18.9 METASTATIC COLON CANCER TO LIVER (HCC): Primary | ICD-10-CM

## 2023-12-12 DIAGNOSIS — R42 LIGHTHEADEDNESS: ICD-10-CM

## 2023-12-12 DIAGNOSIS — Z17.0 MALIGNANT NEOPLASM OF UPPER-OUTER QUADRANT OF RIGHT BREAST IN FEMALE, ESTROGEN RECEPTOR POSITIVE: ICD-10-CM

## 2023-12-12 DIAGNOSIS — C18.9 METASTATIC COLON CANCER TO LIVER (HCC): ICD-10-CM

## 2023-12-12 DIAGNOSIS — I10 PRIMARY HYPERTENSION: ICD-10-CM

## 2023-12-12 DIAGNOSIS — I51.89 DIASTOLIC DYSFUNCTION: ICD-10-CM

## 2023-12-12 DIAGNOSIS — C78.7 METASTATIC COLON CANCER TO LIVER (HCC): ICD-10-CM

## 2023-12-12 DIAGNOSIS — Z51.81 ENCOUNTER FOR MONITORING CARDIOTOXIC DRUG THERAPY: ICD-10-CM

## 2023-12-12 DIAGNOSIS — C09.9 RIGHT TONSILLAR SQUAMOUS CELL CARCINOMA (HCC): ICD-10-CM

## 2023-12-12 DIAGNOSIS — Z79.899 HIGH RISK MEDICATION USE: ICD-10-CM

## 2023-12-12 DIAGNOSIS — N18.30 STAGE 3 CHRONIC KIDNEY DISEASE, UNSPECIFIED WHETHER STAGE 3A OR 3B CKD (HCC): ICD-10-CM

## 2023-12-12 DIAGNOSIS — E66.9 OBESITY (BMI 35.0-39.9 WITHOUT COMORBIDITY): ICD-10-CM

## 2023-12-12 DIAGNOSIS — C50.411 MALIGNANT NEOPLASM OF UPPER-OUTER QUADRANT OF RIGHT BREAST IN FEMALE, ESTROGEN RECEPTOR POSITIVE: ICD-10-CM

## 2023-12-12 DIAGNOSIS — R06.09 DOE (DYSPNEA ON EXERTION): Primary | ICD-10-CM

## 2023-12-12 DIAGNOSIS — R06.02 SOB (SHORTNESS OF BREATH): ICD-10-CM

## 2023-12-12 DIAGNOSIS — Z79.899 ENCOUNTER FOR MONITORING CARDIOTOXIC DRUG THERAPY: ICD-10-CM

## 2023-12-12 PROCEDURE — 93000 ELECTROCARDIOGRAM COMPLETE: CPT | Performed by: INTERNAL MEDICINE

## 2023-12-12 PROCEDURE — 99205 OFFICE O/P NEW HI 60 MIN: CPT | Performed by: INTERNAL MEDICINE

## 2023-12-12 PROCEDURE — 99214 OFFICE O/P EST MOD 30 MIN: CPT

## 2023-12-12 RX ORDER — ONDANSETRON HYDROCHLORIDE 8 MG/1
8 TABLET, FILM COATED ORAL EVERY 8 HOURS PRN
Qty: 30 TABLET | Refills: 3 | Status: SHIPPED | OUTPATIENT
Start: 2023-12-12

## 2023-12-12 RX ORDER — PROCHLORPERAZINE MALEATE 10 MG
10 TABLET ORAL EVERY 6 HOURS PRN
Qty: 30 TABLET | Refills: 3 | Status: SHIPPED | OUTPATIENT
Start: 2023-12-12

## 2023-12-12 RX ORDER — ONDANSETRON HYDROCHLORIDE 8 MG/1
8 TABLET, FILM COATED ORAL EVERY 8 HOURS PRN
Qty: 30 TABLET | Refills: 3 | OUTPATIENT
Start: 2023-12-12

## 2023-12-12 RX ORDER — LIDOCAINE AND PRILOCAINE 25; 25 MG/G; MG/G
CREAM TOPICAL AS NEEDED
Qty: 30 G | Refills: 3 | Status: SHIPPED | OUTPATIENT
Start: 2023-12-12

## 2023-12-12 RX ORDER — FLUOROURACIL 50 MG/ML
700 INJECTION, SOLUTION INTRAVENOUS ONCE
Status: CANCELLED | OUTPATIENT
Start: 2023-12-18

## 2023-12-12 NOTE — Clinical Note
Carolann Smyth,  I saw your patient today. It looks like the referral took a while to get to me and almost went to general cards. As long as you put "[place] [facility]" or "cardio-oncology" -- the referral should get to me a lot quicker. I am not too concerned about the diastolic dysfunction on echo because this is not unusual for someone her age with high blood pressure and obesity. I am not sure about the dizziness but I do note her recent creatinine was much higher. ... so ? dehydration. I am going to check a troponin and BNP given the chemotherapy she is on. I asked her to hydrate better and I will follow-up in a month or so and if still having dizziness I will check a heart monitor.   Thank you so much for the referral, Rigoberto Ards

## 2023-12-12 NOTE — TELEPHONE ENCOUNTER
Title: Dose reduction    Date patient scheduled: 12/18/23    Original medication ordered: Oxaliplatin and 5FU Bolus    New Medication ordered: 20% dose reduction of Oxaliplatin and 5FU Bolus    Office RN notified patient? ? Yes, during office visit. Is the patient scheduled within 24 hours? ? If yes, follow up with verbal telephone call. Office RN to route to [facility]. Infusion  pool routes to [facility]. Infusion tech to receive message, confirm scheduled treatment duration matches ordered treatment duration or adjust accordingly, and re-link appointment request orders. Infusion tech to notify pharmacy and finance.

## 2023-12-12 NOTE — LETTER
December 12, 2023     Duc Balbuena [address] [facility]  Bora MIRANDAUUNUPYY [address] [facility]    Patient: Thelma Lozano   YOB: 1955   Date of Visit: 12/12/2023       Dear Dr. Sarah Rios: Thank you for referring Thelma Lozano to me for evaluation. Below are my notes for this consultation. If you have questions, please do not hesitate to call me. I look forward to following your patient along with you. Sincerely,        Erlin Anguiano MD        CC: MD Erlin Burger MD  12/12/2023  9:09 AM  Sign when Signing Visit  Cardio-Oncology Clinic Note    Thelma Lozano 80 y.o. 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 Right sided. S/p XRT  Colon cancer  Dx 2023  Metastatic to liver  Current therapy:  anastrazole  Opdivo (nivolumab) and FOLFOX  (started Nov 5835)    # Diastolic dysfunction on echo  Not unexpected echo finding given age, HTN, obesity  Reports mild MOSHER  Exam - euvolemic  Check BNP  No medical therapy for this unless clinical CHF / volume overload    # lightheadedness  At times positional. But not always (can occur laying down). Lasts a few minutes. ? Hypovolemia. Noted recent bump in Cr. Admits doesn't drink much. Exam - euvolemic  Rec increasing hydration  Given can occur at rest, and given the chemotherapy, if not resolved with hydration, will consider checkin ziopatch next visit    # HTN  Losartan 50mg daily  BP today high but missed the losartan. And prior values recently normal.     Continue to monitor    # HLD  Of note, does have coronary calcifications on CT  On Lipitor 40mg qhs  Last  which is quite high. Recently started compliance with the statin. Consider repeat lipids next visit.     # Obesity  BMI 38  Weight loss recommended    # Hx TIA  2017. # CKD 3  Prompted by epic  Not sure this is really chronic as prior values were normal.  More of an GOGO but will follow going forward. # High risk medication use  On immunotherapy - risk of myocarditis which can be life-threatening. Check baseline troponin. On 5-FU - risk of coronary vasospasm. EKG today mild sinus tach, otherwise normal.      Today's Plan Summary:  See above assessment/plan for full details of today's plan. Briefly,     Check troponin  Check BNP  Improve hydration          Reason For Visit / Chief Complaint:  Referred by Dr. Angie Olson for a new patient visit for positional lightheadedness from sitting to standing and diastolic dysfunction on echo. HPI:   Judy Farrar is a 80 y.o.  female with history as noted in the problem list and further detailed in the above assessment and plan. Referred by Dr. Angie Olson for a new patient visit for positional lightheadedness from sitting to standing and diastolic dysfunction on echo. As above, the patient has a history of breast cancer and more recently colon cancer and squamous cell carcinoma of the tonsil. The patient has been started on immunotherapy and FOLFOX. At the recent oncology visit she reported some positional lightheadedness. An echo was obtained demonstrating diastolic dysfunction. She was referred to cardio-oncology. Today, the patient reports - fatigue is main complaint. Also has lightheadedness. Episodes last a few minutes. Can be when just laying there. Can also be positional.   Describes sx's as a dizziness. Sx's started before chemotherapy but worse after starting. No syncope. No CP. Some SOB. Some MOSHER. No palpitations. No leg edema. No orthopnea or PND. Retired. Used to work in [address] for [employer]. Single. 5 kids. Nonsmoker. No ETOH. No drugs.         Cardiac Imaging personally reviewed:  EKG 6/2023  NSR    12-12-23  Sinus tachycardia at 104 bpm.  Otherwise, normal EKG.       Holter or event monitor    Echo 10/2023  Mild LVH. EF 65% (on my reivew). Grade 1 diastolic dysfunction. Normal RV size and function. No significant valve disease         JAZMYN    Cardiac MRI    Stress testing    Coronary CTA or [facility]    RHC    CPET              Patient Active Problem List    Diagnosis Date Noted   • Stage 3 chronic kidney disease, unspecified whether stage 3a or 3b CKD ([address]) 12/12/2023   • Chemotherapy induced neutropenia  11/09/2023   • Dehydration 10/06/2023   • Drug-induced constipation 10/06/2023   • Body mass index (BMI) 40.0-44.9, adult ([address]) 09/22/2023   • Anemia 08/24/2023   • Poor appetite 08/03/2023   • 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[address]) 07/27/2023   • Metastatic colon cancer to liver ([address]) 07/11/2023   • GERD (gastroesophageal reflux disease)    • Obesity    • Right thyroid nodule 04/04/2023   • Cervical lymphadenopathy 03/21/2023   • Prediabetes 07/07/2022   • Vitamin D deficiency 01/25/2019   • Malignant neoplasm of right female breast ([address]) 12/20/2018   • Vasomotor rhinitis 08/09/2018   • Mixed hyperlipidemia 08/09/2018   • Screen for colon cancer 08/09/2018   • Primary hypertension 07/27/2017       Past Medical History:   Diagnosis Date   • Anemia    • Arthritis    • Breast cancer ([address]) 12/17/2018   • Cancer ([address])     right breast, colon, liver, right tonsil   • Encounter for screening for HIV 07/07/2022   • Follow-up examination 04/04/2023   • GERD (gastroesophageal reflux disease)    • Hyperlipidemia    • Hypertension    • Obesity    • Stroke [facility])     TIA    • Stroke [facility])     TIA    • Transaminitis 09/22/2021       Review of Systems   Constitutional:  Negative for chills and fever. HENT:  Negative for nosebleeds. Gastrointestinal:  Positive for abdominal pain. Negative for abdominal distention and blood in stool. Neurological:  Positive for dizziness. Negative for syncope. Psychiatric/Behavioral:  Negative for confusion.     14-point ROS completed and negative except as stated above and/or in the HPI.     No Known Allergies    Current Outpatient Medications   Medication Instructions   • anastrozole (ARIMIDEX) 1 mg, Oral, Daily   • atorvastatin (LIPITOR) 40 mg, Oral, Daily at bedtime   • Cyanocobalamin (B-12 PO) Oral   • docusate sodium (COLACE) 50 mg, Oral, 2 times daily   • ergocalciferol (VITAMIN D2) 50,000 Units, Oral, Weekly   • [START ON 12/18/2023] fluorouracil 5,375 mg in CADD/Elastomeric Infusion Device 1,200 mg/m2/day, Intravenous, Over 46 hours   • folic acid (FOLVITE) 1 mg, Oral, Daily   • lidocaine-prilocaine (EMLA) cream Topical, As needed   • losartan (COZAAR) 50 mg, Oral, Daily   • Magnesium Oxide 400 mg, Oral, 2 times daily   • omeprazole (PRILOSEC) 20 mg, Oral, Daily   • ondansetron (ZOFRAN) 8 mg, Oral, Every 8 hours PRN   • potassium chloride (K-DUR,KLOR-CON) 20 mEq tablet 20 mEq, Oral, 2 times daily   • prochlorperazine (COMPAZINE) 10 mg, Oral, Every 6 hours PRN   • VITAMIN D PO 50,000 Units, Weekly       Social History     Socioeconomic History   • Marital status: Single     Spouse name: Not on file   • Number of children: Not on file   • Years of education: Not on file   • Highest education level: Not on file   Occupational History   • Not on file   Tobacco Use   • Smoking status: Never     Passive exposure: Never   • Smokeless tobacco: Never   • Tobacco comments:     NO TOBACCO USE   Vaping Use   • Vaping Use: Never used   Substance and Sexual Activity   • Alcohol use: No   • Drug use: No   • Sexual activity: Not on file   Other Topics Concern   • Not on file   Social History Narrative   • Not on file     Social Determinants of Health     Financial Resource Strain: Low Risk  (10/9/2023)    Overall Financial Resource Strain (CARDIA)    • Difficulty of Paying Living Expenses: Not hard at all   Food Insecurity: No Food Insecurity (10/9/2023)    Hunger Vital Sign    • Worried About Running Out of Food in the Last Year: Never true    • Ran Out of Food in the Last Year: Never true   Transportation Needs: No Transportation Needs (10/9/2023)    PRAPARE - Transportation    • Lack of Transportation (Medical): No    • Lack of Transportation (Non-Medical): No   Physical Activity: Not on file   Stress: Not on file   Social Connections: Not on file   Intimate Partner Violence: Not on file   Housing Stability: Not on file       Family History   Problem Relation Age of Onset   • Colon cancer Mother 62   • Hypertension Mother    • Pancreatic cancer Father 61   • Hypertension Daughter    • Hypertension Son        Physical Exam:  Blood pressure (!) 158/102, pulse 104, height 5' 5" (1.651 m), weight 106 kg (233 lb), SpO2 99 %, not currently breastfeeding. Body mass index is 38.77 kg/m². Wt Readings from Last 3 Encounters:   12/12/23 106 kg (233 lb)   12/04/23 110 kg (243 lb 6.2 oz)   12/01/23 109 kg (241 lb)     Physical Exam  Vitals reviewed. Constitutional:       General: She is not in acute distress. Appearance: She is not toxic-appearing. HENT:      Head: Normocephalic and atraumatic. Eyes:      General: No scleral icterus. Conjunctiva/sclera: Conjunctivae normal.   Neck:      Vascular: No carotid bruit. Comments: No JVD  Cardiovascular:      Rate and Rhythm: Regular rhythm. Tachycardia present. Heart sounds: No murmur heard. No friction rub. No gallop. Pulmonary:      Breath sounds: Normal breath sounds. No wheezing, rhonchi or rales. Abdominal:      General: There is no distension. Palpations: Abdomen is soft. Tenderness: There is no abdominal tenderness. There is no guarding. Musculoskeletal:      Right lower leg: No edema. Left lower leg: No edema. Skin:     Coloration: Skin is not jaundiced or pale. Findings: No erythema. Neurological:      Mental Status: She is alert. Mental status is at baseline.    Psychiatric:         Mood and Affect: Mood normal.         Behavior: Behavior normal.         Labs & Results:  Lab Results   Component Value Date    SODIUM 139 12/01/2023    K 3.9 12/01/2023     12/01/2023    CO2 23 12/01/2023    BUN 12 12/01/2023    CREATININE 1.40 (H) 12/01/2023    GLUC 138 12/01/2023    CALCIUM 9.1 12/01/2023     No results found for: "NTBNP"       Thank you for the opportunity to participate in the care of this patient.     Braden Hernandez MD, [facility]  Staff Cardiologist  Director of [address] [place] [address]

## 2023-12-12 NOTE — PROGRESS NOTES
HEMATOLOGY / [address] [facility] FOLLOW UP NOTE    Primary Care Provider: Yun Concepcion MD  Referring Provider:    MRN: [id number]  : 1955    Reason for Encounter: Follow-up metastatic colon cancer and squamous cell of the tonsil       Oncology History Overview Note   2019 - pT2N0 breast cancer treated with anastrozole, oncotype 13, ER+ CT+ Her2 neg     2023 - metastatic ascending colon adenocarcinoma to liver    Caris - KRAS G12D, CAIN, PD-L1 0%    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, unresectable    2023 - FOLFOX    2023 - opdivo added to FOLFOX    2023-cycle 11-20% dose reduction of 5-FU bolus and oxaliplatin due to increased fatigue     Malignant neoplasm of right female breast ([address])   2018 Initial Diagnosis    Malignant neoplasm of right female breast ([address])     2018 Biopsy    Rt Breast US BX 1100 8cmfn, 4 passes 12g Marquee:  - Invasive breast carcinoma of no special type (ductal NST/invasive ductal carcinoma).   - grade 2  - %  - CT 95%  - HER2 negative     2018 Surgery    Right partial mastectomy and SLN biopsy:  Infiltrating ductal carcinoma, Grade 2/3, felt to be between 2.0 cm and 2.5 cm in greatest dimension  - No invasive tumor is seen at inked margins, but there is a focus of DCIS seen within approximately 1mm of the inked medial margin  - no LVI  - no LCIS  - 0/2 LN's  at least Stage IIA - pT2, pN0, cM0, G2.    - Dr Ioana Alvarez     2018 -  Cancer Staged    Stage IIA - pT2, pN0, cM0, G2.       2019 Genomic Testing    Oncotype DX score: 13     2019 -  Hormone Therapy    anastrozole 1 mg daily as adjuvant endocrine therapy    - Dr Julio C Blanc       2019 - 4/3/2019 Radiation    Course: C1    Plan ID Energy Fractions Dose per Fraction (cGy) Dose Correction (cGy) Total Dose Delivered (cGy) Elapsed Days   R Breast 10X/6X 25 / 25 200 0 5,000 40   R BRST BOOST 16E 5 / 5 200 0 1,000 7      Treatment dates:  C1: 2019 - 4/3/2019       Metastatic colon cancer to liver ([address])   7/6/2023 Genetic Testing    CARIS Results:   KRAS Pathogenic Variant Exon 2  p.G12D : lack of benefit of cetuximab, panitumumab Level 2   No other actionable mutation; MSI stable; TMB low   MiFOLOXAi Results: "Decreased Benefit of FOLFOX + Bevacizumab in first line metastatic CRC. This patient may achieve improved results by receiving an alternative to FOLFOX, such as FOLFIRI, as their initial regimen. As an adjustment to frontline FOLFOXIRI following toxicity: This patient may achieve improved results by removing the oxaliplatin portion of their regimen".          7/11/2023 Initial Diagnosis    Metastatic colon cancer to liver ([address])     7/13/2023 -  Cancer Staged    Staging form: Colon and Rectum, AJCC 8th Edition  - Clinical stage from 7/13/2023: cT3, cN0, pM1 - Signed by DO Zeenat on 9/28/2023  Total positive nodes: 0       7/31/2023 -  Chemotherapy    potassium chloride, 20 mEq, Intravenous, Once, 2 of 2 cycles  Administration: 20 mEq (11/6/2023), 20 mEq (11/20/2023)  alteplase (CATHFLO), 2 mg, Intracatheter, Every 1 Minute as needed, 10 of 15 cycles  pegfilgrastim (NEULASTA), 6 mg, Subcutaneous, Once, 4 of 9 cycles  Administration: 6 mg (10/25/2023), 6 mg (11/8/2023), 6 mg (11/22/2023), 6 mg (12/6/2023)  fluorouracil (ADRUCIL), 895 mg, Intravenous, Once, 10 of 15 cycles  Dose modification: 320 mg/m2 (original dose 400 mg/m2, Cycle 11)  Administration: 900 mg (7/31/2023), 900 mg (8/14/2023), 900 mg (8/28/2023), 900 mg (9/11/2023), 900 mg (9/25/2023), 900 mg (10/9/2023), 900 mg (10/23/2023), 895 mg (11/6/2023), 895 mg (11/20/2023), 895 mg (12/4/2023)  nivolumab (OPDIVO) IVPB, 240 mg (100 % of original dose 240 mg), Intravenous, Once, 2 of 7 cycles  Dose modification: 240 mg (original dose 240 mg, Cycle 9)  Administration: 240 mg (11/20/2023), 240 mg (12/4/2023)  leucovorin calcium IVPB, 896 mg, Intravenous, Once, 10 of 15 cycles  Administration: 900 mg (7/31/2023), 900 mg (8/14/2023), 900 mg (8/28/2023), 900 mg (9/11/2023), 900 mg (9/25/2023), 900 mg (10/9/2023), 900 mg (10/23/2023), 900 mg (11/6/2023), 900 mg (11/20/2023), 900 mg (12/4/2023)  oxaliplatin (ELOXATIN) chemo infusion, 85 mg/m2 = 190.4 mg, Intravenous, Once, 10 of 15 cycles  Dose modification: 68 mg/m2 (original dose 85 mg/m2, Cycle 11, Reason: Other (Must fill in a comment), Comment: increased fatigue)  Administration: 190.4 mg (7/31/2023), 190.4 mg (8/14/2023), 200 mg (8/28/2023), 200 mg (9/11/2023), 200 mg (9/25/2023), 200 mg (10/9/2023), 200 mg (10/23/2023), 200 mg (11/6/2023), 200 mg (11/20/2023), 190.4 mg (12/4/2023)  fluorouracil (ADRUCIL) ambulatory infusion Soln, 1,200 mg/m2/day = 5,375 mg, Intravenous, Over 46 hours, 10 of 15 cycles     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[address])   7/27/2023 Initial Diagnosis    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[address])     7/27/2023 -  Cancer Staged    Staging form: Pharynx - Oropharynx, AJCC 8th Edition  - Clinical stage from 7/27/2023: Stage III (cT1, cN3, cM0, p16+) - Signed by Dio Palacios MD on 7/27/2023  Stage prefix: Initial diagnosis           Interval History: Patient presents for follow-up of her metastatic colon cancer and squamous cell carcinoma of the tonsil prior to cycle 11 of FOLFOX plus nivolumab. After cycle 10, patient reports increased fatigue where after shower she reports she is exhausted and is not able to do anything for the rest of the day. Patient reports she is not eating because food "does not taste right."  She is struggling with hydration, reports is drinking 1-bottle of water a day. She is drinking 1-Ensure a day. Since her last office visit, patient has lost 7 pounds, down from 241 lbs to 234 lbs today. She reports her dizziness has improved. She does have some nausea after treatment, with antiemetics offering relief. 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   She does have some constipation, has a BM every other day and takes Dulcolax as needed. No mouth sores fevers or infections. No CP, SOB, cough. Patient will obtain labs 12/15/2023, prior to treatment on Monday, 12/18/2023. REVIEW OF SYSTEMS:  Please note that a 14-point review of systems was performed to include Constitutional, HEENT, Respiratory, CVS, GI, , Musculoskeletal, Integumentary, Neurologic, Rheumatologic, Endocrinologic, Psychiatric, Lymphatic, and Hematologic/Oncologic systems were reviewed and are negative unless otherwise stated in HPI. Positive and negative findings pertinent to this evaluation are incorporated into the history of present illness. ECOG PS: 0    PROBLEM LIST:  Patient Active Problem List   Diagnosis    Primary hypertension    Vasomotor rhinitis    Mixed hyperlipidemia    Screen for colon cancer    Malignant neoplasm of right female breast (HCC)    Vitamin D deficiency    Prediabetes    Cervical lymphadenopathy    Right thyroid nodule    GERD (gastroesophageal reflux disease)    Obesity    Metastatic colon cancer to liver (HCC)    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HCC)    Poor appetite    Anemia    Body mass index (BMI) 40.0-44.9, adult (HCC)    Dehydration    Drug-induced constipation    Chemotherapy induced neutropenia     Stage 3 chronic kidney disease, unspecified whether stage 3a or 3b CKD (HCC)       Assessment / Plan: After discussing with Dr. Rosaura Gore, we will dose reduce 5-FU bolus and oxaliplatin by 20% due to increased fatigue. Patient is agreeable with the dose reduction. We discussed increasing her oral intake and the importance of protein rich foods. We discussed that chemotherapy can cause change in her taste, however, it is very important that she eats even though the food may make taste appropriate. Recommend she eat small meals throughout the day and increase her Ensure intake for total of 2-3 shakes a day.   Provided some different flavors samples during office visit. Patient is scheduled for hydration on her disconnect days, which she would like to continue. Provided patient with Pedialyte flavors to trial to assist in increasing her hydration at home. We will see patient back in the office with  prior to cycle 12 of FOLFOX plus nivolumab. She is aware to contact us for any worsening symptoms and/or if she has additional questions or concerns. I spent 30 minutes on chart review, face to face counseling time, coordination of care and documentation. Past Medical History:   has a past medical history of Anemia, Arthritis, Breast cancer ([address]) (12/17/2018), Cancer ([address]), Encounter for screening for HIV (07/07/2022), Follow-up examination (04/04/2023), GERD (gastroesophageal reflux disease), Hyperlipidemia, Hypertension, Obesity, Stroke [facility]), Stroke ([address]), and Transaminitis (09/22/2021). PAST SURGICAL HISTORY:   has a past surgical history that includes US guided breast biopsy right complete (Right, 11/23/2018); Breast surgery; pr mastectomy partial w/axillary lymphadenectomy (Right, 12/20/2018); Tubal ligation; Breast biopsy (Right, 11/23/2018); US guided injection for research study (12/20/2018); US guided injection for research study (12/7/2018); US guided injection for research study (5/3/2019); US guided lymph node biopsy right (5/26/2023); IR biopsy liver mass (6/27/2023); pr laryngoscopy w/biopsy microscope/telescope (N/A, 7/20/2023); pr brncc incl fluor gdnce dx w/cell washg spx (N/A, 7/20/2023); ESOPHAGOSCOPY (N/A, 7/20/2023); and IR port placement (7/28/2023).     CURRENT MEDICATIONS  Current Outpatient Medications   Medication Sig Dispense Refill    anastrozole (ARIMIDEX) 1 mg tablet Take 1 tablet (1 mg total) by mouth daily 30 tablet 0    atorvastatin (LIPITOR) 40 mg tablet Take 1 tablet (40 mg total) by mouth daily at bedtime 30 tablet 2    Cyanocobalamin (B-12 PO) Take by mouth      docusate sodium (COLACE) 50 mg capsule Take 1 capsule (50 mg total) by mouth 2 (two) times a day 90 capsule 1    ergocalciferol (VITAMIN D2) 50,000 units Take 1 capsule (50,000 Units total) by mouth once a week 90 capsule 3    [START ON 12/18/2023] fluorouracil 5,375 mg in CADD/Elastomeric Infusion Device Infuse 5,375 mg (1,200 mg/m2/day x 2.24 m2) into a catheter in a vein via infusion device over 46 hours for 2 days  Infusion planned for December 18, 1865. 1 each 0    folic acid (FOLVITE) 1 mg tablet Take 1 tablet (1 mg total) by mouth in the morning 90 tablet 3    lidocaine-prilocaine (EMLA) cream Apply topically as needed for mild pain 30 g 3    losartan (COZAAR) 50 mg tablet Take 1 tablet (50 mg total) by mouth daily 90 tablet 1    omeprazole (PriLOSEC) 20 mg delayed release capsule Take 1 capsule (20 mg total) by mouth daily 30 capsule 5    ondansetron (ZOFRAN) 8 mg tablet Take 1 tablet (8 mg total) by mouth every 8 (eight) hours as needed for nausea or vomiting 30 tablet 3    potassium chloride (K-DUR,KLOR-CON) 20 mEq tablet Take 1 tablet (20 mEq total) by mouth 2 (two) times a day 60 tablet 1    prochlorperazine (COMPAZINE) 10 mg tablet Take 1 tablet (10 mg total) by mouth every 6 (six) hours as needed for nausea or vomiting 30 tablet 3    VITAMIN D PO Take 50,000 Units by mouth once a week      Magnesium Oxide 400 MG CAPS Take 1 tablet (400 mg total) by mouth 2 (two) times a day (Patient not taking: Reported on 12/1/2023) 14 capsule 0     No current facility-administered medications for this visit. [unfilled]    SOCIAL HISTORY:   reports that she has never smoked. She has never been exposed to tobacco smoke. She has never used smokeless tobacco. She reports that she does not drink alcohol and does not use drugs. FAMILY HISTORY:  family history includes Colon cancer (age of onset: 62) in her mother; Hypertension in her daughter, mother, and son; Pancreatic cancer (age of onset: 61) in her father.      ALLERGIES:  has No Known Allergies. Physical Exam:  Vital Signs:   Visit Vitals  /90 (BP Location: Right arm, Patient Position: Sitting, Cuff Size: Adult)   Pulse 62   Temp (!) 97 °F (36.1 °C)   Resp 18   Ht 5' 5" (1.651 m)   Wt 106 kg (234 lb)   SpO2 92%   BMI 38.94 kg/m²   OB Status Postmenopausal   Smoking Status Never   BSA 2.11 m²     Body mass index is 38.94 kg/m². Body surface area is 2.11 meters squared. GEN: Alert, awake oriented x3, in no acute distress  HEENT- No pallor, icterus, cyanosis, no oral mucosal lesions,   LAD - no palpable cervical, clavicle, axillary, inguinal LAD  Heart- normal S1 S2, regular rate and rhythm, No murmur, rubs.    Lungs- clear breathing sound bilateral.   Abdomen- soft, Non tender, bowel sounds present  Extremities- No cyanosis, clubbing, edema  Neuro- No focal neurological deficit    Labs:  Lab Results   Component Value Date    WBC 12.59 (H) 12/01/2023    HGB 8.7 (L) 12/01/2023    HCT 28.2 (L) 12/01/2023    MCV 86 12/01/2023    PLT 98 (L) 12/01/2023     Lab Results   Component Value Date    SODIUM 139 12/01/2023    K 3.9 12/01/2023     12/01/2023    CO2 23 12/01/2023    AGAP 10 12/01/2023    BUN 12 12/01/2023    CREATININE 1.40 (H) 12/01/2023    GLUC 138 12/01/2023    GLUF 110 (H) 08/19/2023    CALCIUM 9.1 12/01/2023    AST 55 (H) 12/01/2023    ALT 36 12/01/2023    ALKPHOS 163 (H) 12/01/2023    TP 7.5 12/01/2023    TBILI 0.61 12/01/2023    EGFR 38 12/01/2023

## 2023-12-14 ENCOUNTER — DOCUMENTATION (OUTPATIENT)
Dept: HEMATOLOGY ONCOLOGY | Facility: CLINIC | Age: 68
End: 2023-12-14

## 2023-12-14 NOTE — PROGRESS NOTES
E mail received from [facility] stating the pt has not returned phone calls, and has not been outside for her scheduled .   I called the pt, no answer, I left a detailed message with my contact information

## 2023-12-15 ENCOUNTER — HOSPITAL ENCOUNTER (OUTPATIENT)
Dept: INFUSION CENTER | Facility: CLINIC | Age: 68
End: 2023-12-15
Payer: MEDICARE

## 2023-12-15 DIAGNOSIS — C78.7 METASTATIC COLON CANCER TO LIVER (HCC): Primary | ICD-10-CM

## 2023-12-15 DIAGNOSIS — Z79.899 ENCOUNTER FOR MONITORING CARDIOTOXIC DRUG THERAPY: ICD-10-CM

## 2023-12-15 DIAGNOSIS — Z17.0 MALIGNANT NEOPLASM OF UPPER-OUTER QUADRANT OF RIGHT BREAST IN FEMALE, ESTROGEN RECEPTOR POSITIVE: ICD-10-CM

## 2023-12-15 DIAGNOSIS — R06.09 DOE (DYSPNEA ON EXERTION): ICD-10-CM

## 2023-12-15 DIAGNOSIS — Z51.81 ENCOUNTER FOR MONITORING CARDIOTOXIC DRUG THERAPY: ICD-10-CM

## 2023-12-15 DIAGNOSIS — C09.9 RIGHT TONSILLAR SQUAMOUS CELL CARCINOMA (HCC): ICD-10-CM

## 2023-12-15 DIAGNOSIS — C18.9 METASTATIC COLON CANCER TO LIVER (HCC): Primary | ICD-10-CM

## 2023-12-15 DIAGNOSIS — R06.02 SOB (SHORTNESS OF BREATH): ICD-10-CM

## 2023-12-15 DIAGNOSIS — C50.411 MALIGNANT NEOPLASM OF UPPER-OUTER QUADRANT OF RIGHT BREAST IN FEMALE, ESTROGEN RECEPTOR POSITIVE: ICD-10-CM

## 2023-12-15 LAB
ALBUMIN SERPL BCP-MCNC: 3.5 G/DL (ref 3.5–5)
ALP SERPL-CCNC: 137 U/L (ref 34–104)
ALT SERPL W P-5'-P-CCNC: 51 U/L (ref 7–52)
ANION GAP SERPL CALCULATED.3IONS-SCNC: 8 MMOL/L
ANISOCYTOSIS BLD QL SMEAR: PRESENT
AST SERPL W P-5'-P-CCNC: 75 U/L (ref 13–39)
BASOPHILS # BLD MANUAL: 0 THOUSAND/UL (ref 0–0.1)
BASOPHILS NFR MAR MANUAL: 0 % (ref 0–1)
BILIRUB SERPL-MCNC: 0.51 MG/DL (ref 0.2–1)
BNP SERPL-MCNC: 51 PG/ML (ref 0–100)
BUN SERPL-MCNC: 7 MG/DL (ref 5–25)
CALCIUM SERPL-MCNC: 9.3 MG/DL (ref 8.4–10.2)
CEA SERPL-MCNC: 3.5 NG/ML (ref 0–3)
CHLORIDE SERPL-SCNC: 106 MMOL/L (ref 96–108)
CO2 SERPL-SCNC: 25 MMOL/L (ref 21–32)
CREAT SERPL-MCNC: 0.71 MG/DL (ref 0.6–1.3)
EOSINOPHIL # BLD MANUAL: 0.27 THOUSAND/UL (ref 0–0.4)
EOSINOPHIL NFR BLD MANUAL: 6 % (ref 0–6)
ERYTHROCYTE [DISTWIDTH] IN BLOOD BY AUTOMATED COUNT: 24.1 % (ref 11.6–15.1)
GFR SERPL CREATININE-BSD FRML MDRD: 88 ML/MIN/1.73SQ M
GLUCOSE SERPL-MCNC: 132 MG/DL (ref 65–140)
HCT VFR BLD AUTO: 25.7 % (ref 34.8–46.1)
HGB BLD-MCNC: 8.2 G/DL (ref 11.5–15.4)
LYMPHOCYTES # BLD AUTO: 1.67 THOUSAND/UL (ref 0.6–4.47)
LYMPHOCYTES # BLD AUTO: 37 % (ref 14–44)
MCH RBC QN AUTO: 28 PG (ref 26.8–34.3)
MCHC RBC AUTO-ENTMCNC: 31.9 G/DL (ref 31.4–37.4)
MCV RBC AUTO: 88 FL (ref 82–98)
MONOCYTES # BLD AUTO: 0.59 THOUSAND/UL (ref 0–1.22)
MONOCYTES NFR BLD: 13 % (ref 4–12)
NEUTROPHILS # BLD MANUAL: 1.98 THOUSAND/UL (ref 1.85–7.62)
NEUTS BAND NFR BLD MANUAL: 1 % (ref 0–8)
NEUTS SEG NFR BLD AUTO: 43 % (ref 43–75)
OVALOCYTES BLD QL SMEAR: PRESENT
PLATELET # BLD AUTO: 77 THOUSANDS/UL (ref 149–390)
PLATELET BLD QL SMEAR: ABNORMAL
PMV BLD AUTO: 11.1 FL (ref 8.9–12.7)
POLYCHROMASIA BLD QL SMEAR: PRESENT
POTASSIUM SERPL-SCNC: 3.3 MMOL/L (ref 3.5–5.3)
PROT SERPL-MCNC: 7.2 G/DL (ref 6.4–8.4)
RBC # BLD AUTO: 2.93 MILLION/UL (ref 3.81–5.12)
SODIUM SERPL-SCNC: 139 MMOL/L (ref 135–147)
TSH SERPL DL<=0.05 MIU/L-ACNC: 2.79 UIU/ML (ref 0.45–4.5)
WBC # BLD AUTO: 4.5 THOUSAND/UL (ref 4.31–10.16)

## 2023-12-15 PROCEDURE — 80053 COMPREHEN METABOLIC PANEL: CPT | Performed by: INTERNAL MEDICINE

## 2023-12-15 PROCEDURE — 85027 COMPLETE CBC AUTOMATED: CPT | Performed by: INTERNAL MEDICINE

## 2023-12-15 PROCEDURE — 83880 ASSAY OF NATRIURETIC PEPTIDE: CPT

## 2023-12-15 PROCEDURE — 82378 CARCINOEMBRYONIC ANTIGEN: CPT | Performed by: INTERNAL MEDICINE

## 2023-12-15 PROCEDURE — 85007 BL SMEAR W/DIFF WBC COUNT: CPT | Performed by: INTERNAL MEDICINE

## 2023-12-15 PROCEDURE — 84443 ASSAY THYROID STIM HORMONE: CPT | Performed by: INTERNAL MEDICINE

## 2023-12-15 NOTE — PROGRESS NOTES
Patient arrived to unit without complaint. Patient had central labs drawn and port flushed without incident. AVS declined and patient aware of appointment 12/18/23 at 08:30. Patient left in stable condition.

## 2023-12-17 ENCOUNTER — RA CDI HCC (OUTPATIENT)
Dept: OTHER | Facility: HOSPITAL | Age: 68
End: 2023-12-17

## 2023-12-17 NOTE — PROGRESS NOTES
I13.0, E66.01  HCC coding opportunities          Chart Reviewed number of suggestions sent to Provider: 2     Patients Insurance     Medicare Insurance: Medicare

## 2023-12-18 ENCOUNTER — HOSPITAL ENCOUNTER (OUTPATIENT)
Dept: INFUSION CENTER | Facility: CLINIC | Age: 68
Discharge: HOME | End: 2023-12-18
Payer: MEDICARE

## 2023-12-18 VITALS
DIASTOLIC BLOOD PRESSURE: 76 MMHG | TEMPERATURE: 96.7 F | HEIGHT: 65 IN | RESPIRATION RATE: 18 BRPM | HEART RATE: 106 BPM | BODY MASS INDEX: 39.38 KG/M2 | WEIGHT: 236.33 LBS | SYSTOLIC BLOOD PRESSURE: 113 MMHG

## 2023-12-18 DIAGNOSIS — C18.9 METASTATIC COLON CANCER TO LIVER (HCC): Primary | ICD-10-CM

## 2023-12-18 DIAGNOSIS — C78.7 METASTATIC COLON CANCER TO LIVER (HCC): Primary | ICD-10-CM

## 2023-12-18 PROCEDURE — 96413 CHEMO IV INFUSION 1 HR: CPT

## 2023-12-18 PROCEDURE — 96417 CHEMO IV INFUS EACH ADDL SEQ: CPT

## 2023-12-18 PROCEDURE — 96411 CHEMO IV PUSH ADDL DRUG: CPT

## 2023-12-18 PROCEDURE — G0498 CHEMO EXTEND IV INFUS W/PUMP: HCPCS

## 2023-12-18 PROCEDURE — 96415 CHEMO IV INFUSION ADDL HR: CPT

## 2023-12-18 PROCEDURE — 96368 THER/DIAG CONCURRENT INF: CPT

## 2023-12-18 PROCEDURE — 96367 TX/PROPH/DG ADDL SEQ IV INF: CPT

## 2023-12-18 RX ORDER — DEXTROSE MONOHYDRATE 50 MG/ML
20 INJECTION, SOLUTION INTRAVENOUS ONCE
Status: COMPLETED | OUTPATIENT
Start: 2023-12-18 | End: 2023-12-18

## 2023-12-18 RX ORDER — SODIUM CHLORIDE 9 MG/ML
20 INJECTION, SOLUTION INTRAVENOUS ONCE AS NEEDED
Status: DISCONTINUED | OUTPATIENT
Start: 2023-12-18 | End: 2023-12-21 | Stop reason: HOSPADM

## 2023-12-18 RX ORDER — FLUOROURACIL 50 MG/ML
700 INJECTION, SOLUTION INTRAVENOUS ONCE
Status: COMPLETED | OUTPATIENT
Start: 2023-12-18 | End: 2023-12-18

## 2023-12-18 RX ADMIN — DEXAMETHASONE SODIUM PHOSPHATE: 10 INJECTION, SOLUTION INTRAMUSCULAR; INTRAVENOUS at 09:07

## 2023-12-18 RX ADMIN — OXALIPLATIN 150 MG: 5 INJECTION, SOLUTION INTRAVENOUS at 10:32

## 2023-12-18 RX ADMIN — SODIUM CHLORIDE 240 MG: 9 INJECTION, SOLUTION INTRAVENOUS at 09:37

## 2023-12-18 RX ADMIN — LEUCOVORIN CALCIUM 900 MG: 200 INJECTION, POWDER, LYOPHILIZED, FOR SUSPENSION INTRAMUSCULAR; INTRAVENOUS at 10:32

## 2023-12-18 RX ADMIN — DEXTROSE 20 ML/HR: 5 SOLUTION INTRAVENOUS at 10:27

## 2023-12-18 RX ADMIN — SODIUM CHLORIDE 20 ML/HR: 0.9 INJECTION, SOLUTION INTRAVENOUS at 09:06

## 2023-12-18 RX ADMIN — FLUOROURACIL 700 MG: 50 INJECTION, SOLUTION INTRAVENOUS at 12:35

## 2023-12-18 NOTE — PROGRESS NOTES
Patient tolerated treatment without complication. 5FU BRANDIE connected. Patient aware to return on Wednesday at 11:00 for disconnect. Declined AVS, left unit in stable condition.

## 2023-12-19 ENCOUNTER — PATIENT OUTREACH (OUTPATIENT)
Dept: HEMATOLOGY ONCOLOGY | Facility: CLINIC | Age: 68
End: 2023-12-19

## 2023-12-19 NOTE — PROGRESS NOTES
Returned pts call, pt left a voice mail wanting to confirm her infusion appt for tomorrow 12/20/2023.  I left her a voice mail confirming her appt is for 9am.  I left my contact information if she needs to call me back   Confirmed with the infusion center, disconnect time is 11am, not 9am.  E mail sent to  [facility] with the correct time. E mail confirmation received back from them    Pt returned my call, and she is aware of her 11am appt, and that [facility]  is also aware. She understood, and was thankful for my assistance

## 2023-12-20 ENCOUNTER — HOSPITAL ENCOUNTER (OUTPATIENT)
Dept: INFUSION CENTER | Facility: CLINIC | Age: 68
Discharge: HOME | End: 2023-12-20
Payer: MEDICARE

## 2023-12-20 VITALS
DIASTOLIC BLOOD PRESSURE: 77 MMHG | RESPIRATION RATE: 18 BRPM | SYSTOLIC BLOOD PRESSURE: 113 MMHG | HEART RATE: 98 BPM | TEMPERATURE: 96.8 F

## 2023-12-20 DIAGNOSIS — C18.9 METASTATIC COLON CANCER TO LIVER (HCC): Primary | ICD-10-CM

## 2023-12-20 DIAGNOSIS — C78.7 METASTATIC COLON CANCER TO LIVER (HCC): Primary | ICD-10-CM

## 2023-12-20 PROCEDURE — 96372 THER/PROPH/DIAG INJ SC/IM: CPT

## 2023-12-20 PROCEDURE — 96360 HYDRATION IV INFUSION INIT: CPT

## 2023-12-20 RX ADMIN — SODIUM CHLORIDE 1000 ML: 0.9 INJECTION, SOLUTION INTRAVENOUS at 11:16

## 2023-12-20 RX ADMIN — PEGFILGRASTIM 6 MG: 6 INJECTION SUBCUTANEOUS at 11:36

## 2023-12-20 NOTE — PROGRESS NOTES
Maira Moura  tolerated treatment well with no complications.      Maira Moura is aware of future appt on 1/2/24 at 0830.     AVS printed and given to Maira Moura.

## 2023-12-28 DIAGNOSIS — C18.9 METASTATIC COLON CANCER TO LIVER (HCC): Primary | ICD-10-CM

## 2023-12-28 DIAGNOSIS — C78.7 METASTATIC COLON CANCER TO LIVER (HCC): Primary | ICD-10-CM

## 2023-12-28 RX ORDER — FLUOROURACIL 50 MG/ML
320 INJECTION, SOLUTION INTRAVENOUS ONCE
OUTPATIENT
Start: 2024-01-09

## 2023-12-29 ENCOUNTER — OFFICE VISIT (OUTPATIENT)
Dept: HEMATOLOGY ONCOLOGY | Facility: CLINIC | Age: 68
End: 2023-12-29
Payer: MEDICARE

## 2023-12-29 ENCOUNTER — TELEPHONE (OUTPATIENT)
Dept: INFUSION CENTER | Facility: HOSPITAL | Age: 68
End: 2023-12-29

## 2023-12-29 VITALS
WEIGHT: 227 LBS | SYSTOLIC BLOOD PRESSURE: 140 MMHG | HEIGHT: 65 IN | HEART RATE: 111 BPM | DIASTOLIC BLOOD PRESSURE: 90 MMHG | BODY MASS INDEX: 37.82 KG/M2 | OXYGEN SATURATION: 97 % | RESPIRATION RATE: 18 BRPM | TEMPERATURE: 97 F

## 2023-12-29 DIAGNOSIS — C18.9 METASTATIC COLON CANCER TO LIVER (HCC): Primary | ICD-10-CM

## 2023-12-29 DIAGNOSIS — D69.6 PLATELETS DECREASED (HCC): ICD-10-CM

## 2023-12-29 DIAGNOSIS — C78.7 METASTATIC COLON CANCER TO LIVER (HCC): Primary | ICD-10-CM

## 2023-12-29 PROCEDURE — 99214 OFFICE O/P EST MOD 30 MIN: CPT | Performed by: INTERNAL MEDICINE

## 2023-12-29 RX ORDER — MIRTAZAPINE 15 MG/1
15 TABLET, FILM COATED ORAL
Qty: 30 TABLET | Refills: 1 | Status: SHIPPED | OUTPATIENT
Start: 2023-12-29

## 2023-12-29 NOTE — TELEPHONE ENCOUNTER
Pt was a no show for appointment today. Left a voicemail telling her she needs to get labs prior to tx on Tuesday.

## 2023-12-29 NOTE — PROGRESS NOTES
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  - grade 2  - %  - SD 95%  - HER2 negative     2018 Surgery    Right partial mastectomy and SLN biopsy:  Infiltrating ductal carcinoma, Grade 2/3, felt to be between 2.0 cm and 2.5 cm in greatest dimension  - No invasive tumor is seen at inked margins, but there is a focus of DCIS seen within approximately 1mm of the inked medial margin  - no LVI  - no LCIS  - 0/2 LN's  at least Stage IIA - pT2, pN0, cM0, G2.    - Dr Coronado     2018 -  Cancer Staged    Stage IIA - pT2, pN0, cM0, G2.       2019 Genomic Testing    Oncotype DX score: 13     2019 -  Hormone Therapy    anastrozole 1 mg daily as adjuvant endocrine therapy    - Dr Daley       2019 - 4/3/2019 Radiation    Course: C1    Plan ID Energy Fractions Dose per Fraction (cGy) Dose Correction (cGy) Total Dose Delivered (cGy) Elapsed Days   R Breast 10X/6X 25 / 25 200 0 5,000 40   R BRST BOOST 16E 5 / 5 200 0 1,000 7      Treatment dates:  C1: 2019 -  "4/3/2019       Metastatic colon cancer to liver (HCC)   7/6/2023 Genetic Testing    CARIS Results:   KRAS Pathogenic Variant Exon 2  p.G12D : lack of benefit of cetuximab, panitumumab Level 2   No other actionable mutation; MSI stable; TMB low   MiFOLOXAi Results: \"Decreased Benefit of FOLFOX + Bevacizumab in first line metastatic CRC.  This patient may achieve improved results by receiving an alternative to FOLFOX, such as FOLFIRI, as their initial regimen. As an adjustment to frontline FOLFOXIRI following toxicity: This patient may achieve improved results by removing the oxaliplatin portion of their regimen\".         7/11/2023 Initial Diagnosis    Metastatic colon cancer to liver (HCC)     7/13/2023 -  Cancer Staged    Staging form: Colon and Rectum, AJCC 8th Edition  - Clinical stage from 7/13/2023: cT3, cN0, pM1 - Signed by Harika Medina DO on 9/28/2023  Total positive nodes: 0       7/31/2023 -  Chemotherapy    potassium chloride, 20 mEq, Intravenous, Once, 2 of 2 cycles  Administration: 20 mEq (11/6/2023), 20 mEq (11/20/2023)  alteplase (CATHFLO), 2 mg, Intracatheter, Every 1 Minute as needed, 11 of 15 cycles  pegfilgrastim (NEULASTA), 6 mg, Subcutaneous, Once, 5 of 9 cycles  Administration: 6 mg (10/25/2023), 6 mg (11/8/2023), 6 mg (11/22/2023), 6 mg (12/6/2023), 6 mg (12/20/2023)  fluorouracil (ADRUCIL), 895 mg, Intravenous, Once, 11 of 15 cycles  Dose modification: 320 mg/m2 (original dose 400 mg/m2, Cycle 11)  Administration: 900 mg (7/31/2023), 900 mg (8/14/2023), 900 mg (8/28/2023), 900 mg (9/11/2023), 900 mg (9/25/2023), 900 mg (10/9/2023), 900 mg (10/23/2023), 895 mg (11/6/2023), 895 mg (11/20/2023), 895 mg (12/4/2023), 700 mg (12/18/2023)  nivolumab (OPDIVO) IVPB, 240 mg (100 % of original dose 240 mg), Intravenous, Once, 3 of 7 cycles  Dose modification: 240 mg (original dose 240 mg, Cycle 9)  Administration: 240 mg (11/20/2023), 240 mg (12/4/2023), 240 mg (12/18/2023)  leucovorin calcium IVPB, " 896 mg, Intravenous, Once, 11 of 15 cycles  Administration: 900 mg (7/31/2023), 900 mg (8/14/2023), 900 mg (8/28/2023), 900 mg (9/11/2023), 900 mg (9/25/2023), 900 mg (10/9/2023), 900 mg (10/23/2023), 900 mg (11/6/2023), 900 mg (11/20/2023), 900 mg (12/4/2023), 900 mg (12/18/2023)  oxaliplatin (ELOXATIN) chemo infusion, 85 mg/m2 = 190.4 mg, Intravenous, Once, 11 of 15 cycles  Dose modification: 68 mg/m2 (original dose 85 mg/m2, Cycle 11, Reason: Other (Must fill in a comment), Comment: increased fatigue)  Administration: 190.4 mg (7/31/2023), 190.4 mg (8/14/2023), 200 mg (8/28/2023), 200 mg (9/11/2023), 200 mg (9/25/2023), 200 mg (10/9/2023), 200 mg (10/23/2023), 200 mg (11/6/2023), 200 mg (11/20/2023), 190.4 mg (12/4/2023), 150 mg (12/18/2023)  fluorouracil (ADRUCIL) ambulatory infusion Soln, 1,200 mg/m2/day = 5,375 mg, Intravenous, Over 46 hours, 11 of 15 cycles     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HCC)   7/27/2023 Initial Diagnosis    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HCC)     7/27/2023 -  Cancer Staged    Staging form: Pharynx - Oropharynx, AJCC 8th Edition  - Clinical stage from 7/27/2023: Stage III (cT1, cN3, cM0, p16+) - Signed by Evan Plummer MD on 7/27/2023  Stage prefix: Initial diagnosis           Interval History:     Patient presents for follow-up of her metastatic colon cancer and squamous cell carcinoma of the tonsil prior to cycle 12 of FOLFOX plus nivolumab.     Patient continues to have poor appetite and poor taste sensation.  She has lost about 7 pounds over the last 2 weeks.  She has been drinking Ensure once daily, but eats only a small portion of her meals due to poor appetite and taste sensation.  Additionally, patient has noticed mild numbness/tingling sensation in her feet and fingers for the past several weeks; she denies having any difficulty with buttoning clothes and she is not having any fall episodes or dropping objects.     Review of Systems   Constitutional:   Positive for appetite change (decreased taste sensation), fatigue and unexpected weight change (7 lb weight loss over last 2 weeks). Negative for fever.   Eyes:  Negative for eye problems and icterus.   Respiratory:  Negative for chest tightness and cough.    Musculoskeletal:  Negative for arthralgias, back pain and gait problem.   Skin:  Negative for itching, rash and wound.   Neurological:  Positive for light-headedness and numbness (in fingers/toes). Negative for dizziness, extremity weakness, gait problem and speech difficulty.   Hematological:  Negative for adenopathy. Does not bruise/bleed easily.   Psychiatric/Behavioral:  Negative for confusion and decreased concentration. The patient is not nervous/anxious.        ECOG PS: 0    PROBLEM LIST:  Patient Active Problem List   Diagnosis    Primary hypertension    Vasomotor rhinitis    Mixed hyperlipidemia    Screen for colon cancer    Malignant neoplasm of right female breast (HCC)    Vitamin D deficiency    Prediabetes    Cervical lymphadenopathy    Right thyroid nodule    GERD (gastroesophageal reflux disease)    Obesity    Metastatic colon cancer to liver (HCC)    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HCC)    Poor appetite    Anemia    Body mass index (BMI) 40.0-44.9, adult (HCC)    Dehydration    Drug-induced constipation    Chemotherapy induced neutropenia     Stage 3 chronic kidney disease, unspecified whether stage 3a or 3b CKD (HCC)    Platelets decreased (HCC)       Assessment / Plan:     Metastatic colon cancer   Tonsillar squamous cell carcinoma    Ms. Moura is a 68-year-old female with metastatic colon cancer with liver metastasis and squamous cell carcinoma of the tonsil, diagnosed in July 2023.  She is currently on systemic therapy with FOLFOX and nivolumab.  Patient required dose reductions of her 5-FU and oxaliplatin starting with cycle 11 due to concerns for chemotherapy contributing to her significant weight loss with decreased appetite  and taste sensation.  Patient has noticed mild neuropathy for the past several weeks as well.    We will plan to continue systemic therapy with reduced dose of FOLFOX, along with nivolumab, every 2 weeks. Patient was recommended mirtazapine 15mg daily as appetite stimulant. She was informed that she can take Ensure three times daily. PET scan to be scheduled in the next few weeks to evaluate for disease status and treatment response. Patient was advised to inform us if she were to notice any worsening of her neuropathy (neuropathy appears to be mild at this time, Grade 1) so that we can dose reduce oxaliplatin even further. Patient was recommended to follow-up in office prior to C13.     Past Medical History:   has a past medical history of Anemia, Arthritis, Breast cancer (HCC) (12/17/2018), Cancer (HCC), Encounter for screening for HIV (07/07/2022), Follow-up examination (04/04/2023), GERD (gastroesophageal reflux disease), Hyperlipidemia, Hypertension, Obesity, Stroke (HCC), Stroke (HCC), and Transaminitis (09/22/2021).    PAST SURGICAL HISTORY:   has a past surgical history that includes US guided breast biopsy right complete (Right, 11/23/2018); Breast surgery; pr mastectomy partial w/axillary lymphadenectomy (Right, 12/20/2018); Tubal ligation; Breast biopsy (Right, 11/23/2018); US guided injection for research study (12/20/2018); US guided injection for research study (12/7/2018); US guided injection for research study (5/3/2019); US guided lymph node biopsy right (5/26/2023); IR biopsy liver mass (6/27/2023); pr laryngoscopy w/biopsy microscope/telescope (N/A, 7/20/2023); pr [facility] incl fluor gdnce dx w/cell washg spx (N/A, 7/20/2023); ESOPHAGOSCOPY (N/A, 7/20/2023); and IR port placement (7/28/2023).    CURRENT MEDICATIONS  Current Outpatient Medications   Medication Sig Dispense Refill    anastrozole (ARIMIDEX) 1 mg tablet Take 1 tablet (1 mg total) by mouth daily 30 tablet 0    atorvastatin (LIPITOR) 40  mg tablet Take 1 tablet (40 mg total) by mouth daily at bedtime 30 tablet 2    Cyanocobalamin (B-12 PO) Take by mouth      docusate sodium (COLACE) 50 mg capsule Take 1 capsule (50 mg total) by mouth 2 (two) times a day 90 capsule 1    ergocalciferol (VITAMIN D2) 50,000 units Take 1 capsule (50,000 Units total) by mouth once a week 90 capsule 3    [START ON 1/2/2024] fluorouracil 5,090 mg in CADD/Elastomeric Infusion Device Infuse 5,090 mg (1,200 mg/m2/day x 2.12 m2) into a catheter in a vein via infusion device over 46 hours for 2 days  Infusion planned for January 2, 2024. 1 each 0    folic acid (FOLVITE) 1 mg tablet Take 1 tablet (1 mg total) by mouth in the morning 90 tablet 3    lidocaine-prilocaine (EMLA) cream Apply topically as needed for mild pain 30 g 3    losartan (COZAAR) 50 mg tablet Take 1 tablet (50 mg total) by mouth daily 90 tablet 1    mirtazapine (REMERON) 15 mg tablet Take 1 tablet (15 mg total) by mouth daily at bedtime 30 tablet 1    omeprazole (PriLOSEC) 20 mg delayed release capsule Take 1 capsule (20 mg total) by mouth daily 30 capsule 5    ondansetron (ZOFRAN) 8 mg tablet Take 1 tablet (8 mg total) by mouth every 8 (eight) hours as needed for nausea or vomiting 30 tablet 3    potassium chloride (K-DUR,KLOR-CON) 20 mEq tablet Take 1 tablet (20 mEq total) by mouth 2 (two) times a day 60 tablet 1    prochlorperazine (COMPAZINE) 10 mg tablet Take 1 tablet (10 mg total) by mouth every 6 (six) hours as needed for nausea or vomiting 30 tablet 3    VITAMIN D PO Take 50,000 Units by mouth once a week      Magnesium Oxide 400 MG CAPS Take 1 tablet (400 mg total) by mouth 2 (two) times a day (Patient not taking: Reported on 12/1/2023) 14 capsule 0     No current facility-administered medications for this visit.     [unfilled]    SOCIAL HISTORY:   reports that she has never smoked. She has never been exposed to tobacco smoke. She has never used smokeless tobacco. She reports that she does not drink  "alcohol and does not use drugs.     FAMILY HISTORY:  family history includes Colon cancer (age of onset: 58) in her mother; Hypertension in her daughter, mother, and son; Pancreatic cancer (age of onset: 60) in her father.     ALLERGIES:  has No Known Allergies.      Physical Exam:  Vital Signs:   Visit Vitals  /90 (BP Location: Left arm, Patient Position: Sitting, Cuff Size: Adult)   Pulse (!) 111   Temp (!) 97 °F (36.1 °C)   Resp 18   Ht 5' 5\" (1.651 m)   Wt 103 kg (227 lb)   SpO2 97%   BMI 37.77 kg/m²   OB Status Postmenopausal   Smoking Status Never   BSA 2.09 m²     Body mass index is 37.77 kg/m².  Body surface area is 2.09 meters squared.    Physical Exam  Constitutional:       General: She is not in acute distress.     Appearance: Normal appearance. She is not toxic-appearing.   HENT:      Head: Normocephalic and atraumatic.      Mouth/Throat:      Mouth: Mucous membranes are moist.      Pharynx: No oropharyngeal exudate or posterior oropharyngeal erythema.   Eyes:      General: No scleral icterus.     Extraocular Movements: Extraocular movements intact.      Conjunctiva/sclera: Conjunctivae normal.   Cardiovascular:      Rate and Rhythm: Normal rate and regular rhythm.      Heart sounds: No murmur heard.     No gallop.   Pulmonary:      Effort: No respiratory distress.      Breath sounds: Normal breath sounds. No stridor. No wheezing or rhonchi.   Abdominal:      General: Bowel sounds are normal. There is no distension.      Palpations: Abdomen is soft. There is no mass.      Tenderness: There is no abdominal tenderness.   Musculoskeletal:         General: No swelling, tenderness, deformity or signs of injury.      Cervical back: No rigidity.      Right lower leg: No edema.      Left lower leg: No edema.   Lymphadenopathy:      Cervical: No cervical adenopathy.   Skin:     Capillary Refill: Capillary refill takes less than 2 seconds.      Coloration: Skin is not jaundiced or pale.      Findings: No " bruising or erythema.   Neurological:      General: No focal deficit present.      Mental Status: She is oriented to person, place, and time.      Motor: No weakness.         Labs:  Lab Results   Component Value Date    WBC 4.50 12/15/2023    HGB 8.2 (L) 12/15/2023    HCT 25.7 (L) 12/15/2023    MCV 88 12/15/2023    PLT 77 (L) 12/15/2023     Lab Results   Component Value Date    SODIUM 139 12/15/2023    K 3.3 (L) 12/15/2023     12/15/2023    CO2 25 12/15/2023    AGAP 8 12/15/2023    BUN 7 12/15/2023    CREATININE 0.71 12/15/2023    GLUC 132 12/15/2023    GLUF 110 (H) 08/19/2023    CALCIUM 9.3 12/15/2023    AST 75 (H) 12/15/2023    ALT 51 12/15/2023    ALKPHOS 137 (H) 12/15/2023    TP 7.2 12/15/2023    TBILI 0.51 12/15/2023    EGFR 88 12/15/2023

## 2023-12-29 NOTE — H&P (VIEW-ONLY)
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  - grade 2  - %  - WI 95%  - HER2 negative     2018 Surgery    Right partial mastectomy and SLN biopsy:  Infiltrating ductal carcinoma, Grade 2/3, felt to be between 2.0 cm and 2.5 cm in greatest dimension  - No invasive tumor is seen at inked margins, but there is a focus of DCIS seen within approximately 1mm of the inked medial margin  - no LVI  - no LCIS  - 0/2 LN's  at least Stage IIA - pT2, pN0, cM0, G2.    - Dr Coronado     2018 -  Cancer Staged    Stage IIA - pT2, pN0, cM0, G2.       2019 Genomic Testing    Oncotype DX score: 13     2019 -  Hormone Therapy    anastrozole 1 mg daily as adjuvant endocrine therapy    - Dr Daley       2019 - 4/3/2019 Radiation    Course: C1    Plan ID Energy Fractions Dose per Fraction (cGy) Dose Correction (cGy) Total Dose Delivered (cGy) Elapsed Days   R Breast 10X/6X 25 / 25 200 0 5,000 40   R BRST BOOST 16E 5 / 5 200 0 1,000 7      Treatment dates:  C1: 2019 -  "4/3/2019       Metastatic colon cancer to liver (HCC)   7/6/2023 Genetic Testing    CARIS Results:   KRAS Pathogenic Variant Exon 2  p.G12D : lack of benefit of cetuximab, panitumumab Level 2   No other actionable mutation; MSI stable; TMB low   MiFOLOXAi Results: \"Decreased Benefit of FOLFOX + Bevacizumab in first line metastatic CRC.  This patient may achieve improved results by receiving an alternative to FOLFOX, such as FOLFIRI, as their initial regimen. As an adjustment to frontline FOLFOXIRI following toxicity: This patient may achieve improved results by removing the oxaliplatin portion of their regimen\".         7/11/2023 Initial Diagnosis    Metastatic colon cancer to liver (HCC)     7/13/2023 -  Cancer Staged    Staging form: Colon and Rectum, AJCC 8th Edition  - Clinical stage from 7/13/2023: cT3, cN0, pM1 - Signed by Harika Medina DO on 9/28/2023  Total positive nodes: 0       7/31/2023 -  Chemotherapy    potassium chloride, 20 mEq, Intravenous, Once, 2 of 2 cycles  Administration: 20 mEq (11/6/2023), 20 mEq (11/20/2023)  alteplase (CATHFLO), 2 mg, Intracatheter, Every 1 Minute as needed, 11 of 15 cycles  pegfilgrastim (NEULASTA), 6 mg, Subcutaneous, Once, 5 of 9 cycles  Administration: 6 mg (10/25/2023), 6 mg (11/8/2023), 6 mg (11/22/2023), 6 mg (12/6/2023), 6 mg (12/20/2023)  fluorouracil (ADRUCIL), 895 mg, Intravenous, Once, 11 of 15 cycles  Dose modification: 320 mg/m2 (original dose 400 mg/m2, Cycle 11)  Administration: 900 mg (7/31/2023), 900 mg (8/14/2023), 900 mg (8/28/2023), 900 mg (9/11/2023), 900 mg (9/25/2023), 900 mg (10/9/2023), 900 mg (10/23/2023), 895 mg (11/6/2023), 895 mg (11/20/2023), 895 mg (12/4/2023), 700 mg (12/18/2023)  nivolumab (OPDIVO) IVPB, 240 mg (100 % of original dose 240 mg), Intravenous, Once, 3 of 7 cycles  Dose modification: 240 mg (original dose 240 mg, Cycle 9)  Administration: 240 mg (11/20/2023), 240 mg (12/4/2023), 240 mg (12/18/2023)  leucovorin calcium IVPB, " 896 mg, Intravenous, Once, 11 of 15 cycles  Administration: 900 mg (7/31/2023), 900 mg (8/14/2023), 900 mg (8/28/2023), 900 mg (9/11/2023), 900 mg (9/25/2023), 900 mg (10/9/2023), 900 mg (10/23/2023), 900 mg (11/6/2023), 900 mg (11/20/2023), 900 mg (12/4/2023), 900 mg (12/18/2023)  oxaliplatin (ELOXATIN) chemo infusion, 85 mg/m2 = 190.4 mg, Intravenous, Once, 11 of 15 cycles  Dose modification: 68 mg/m2 (original dose 85 mg/m2, Cycle 11, Reason: Other (Must fill in a comment), Comment: increased fatigue)  Administration: 190.4 mg (7/31/2023), 190.4 mg (8/14/2023), 200 mg (8/28/2023), 200 mg (9/11/2023), 200 mg (9/25/2023), 200 mg (10/9/2023), 200 mg (10/23/2023), 200 mg (11/6/2023), 200 mg (11/20/2023), 190.4 mg (12/4/2023), 150 mg (12/18/2023)  fluorouracil (ADRUCIL) ambulatory infusion Soln, 1,200 mg/m2/day = 5,375 mg, Intravenous, Over 46 hours, 11 of 15 cycles     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HCC)   7/27/2023 Initial Diagnosis    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HCC)     7/27/2023 -  Cancer Staged    Staging form: Pharynx - Oropharynx, AJCC 8th Edition  - Clinical stage from 7/27/2023: Stage III (cT1, cN3, cM0, p16+) - Signed by Evan Plummer MD on 7/27/2023  Stage prefix: Initial diagnosis           Interval History:     Patient presents for follow-up of her metastatic colon cancer and squamous cell carcinoma of the tonsil prior to cycle 12 of FOLFOX plus nivolumab.     Patient continues to have poor appetite and poor taste sensation.  She has lost about 7 pounds over the last 2 weeks.  She has been drinking Ensure once daily, but eats only a small portion of her meals due to poor appetite and taste sensation.  Additionally, patient has noticed mild numbness/tingling sensation in her feet and fingers for the past several weeks; she denies having any difficulty with buttoning clothes and she is not having any fall episodes or dropping objects.     Review of Systems   Constitutional:   Positive for appetite change (decreased taste sensation), fatigue and unexpected weight change (7 lb weight loss over last 2 weeks). Negative for fever.   Eyes:  Negative for eye problems and icterus.   Respiratory:  Negative for chest tightness and cough.    Musculoskeletal:  Negative for arthralgias, back pain and gait problem.   Skin:  Negative for itching, rash and wound.   Neurological:  Positive for light-headedness and numbness (in fingers/toes). Negative for dizziness, extremity weakness, gait problem and speech difficulty.   Hematological:  Negative for adenopathy. Does not bruise/bleed easily.   Psychiatric/Behavioral:  Negative for confusion and decreased concentration. The patient is not nervous/anxious.        ECOG PS: 0    PROBLEM LIST:  Patient Active Problem List   Diagnosis    Primary hypertension    Vasomotor rhinitis    Mixed hyperlipidemia    Screen for colon cancer    Malignant neoplasm of right female breast (HCC)    Vitamin D deficiency    Prediabetes    Cervical lymphadenopathy    Right thyroid nodule    GERD (gastroesophageal reflux disease)    Obesity    Metastatic colon cancer to liver (HCC)    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HCC)    Poor appetite    Anemia    Body mass index (BMI) 40.0-44.9, adult (HCC)    Dehydration    Drug-induced constipation    Chemotherapy induced neutropenia     Stage 3 chronic kidney disease, unspecified whether stage 3a or 3b CKD (HCC)    Platelets decreased (HCC)       Assessment / Plan:     Metastatic colon cancer   Tonsillar squamous cell carcinoma    Ms. Moura is a 68-year-old female with metastatic colon cancer with liver metastasis and squamous cell carcinoma of the tonsil, diagnosed in July 2023.  She is currently on systemic therapy with FOLFOX and nivolumab.  Patient required dose reductions of her 5-FU and oxaliplatin starting with cycle 11 due to concerns for chemotherapy contributing to her significant weight loss with decreased appetite  and taste sensation.  Patient has noticed mild neuropathy for the past several weeks as well.    We will plan to continue systemic therapy with reduced dose of FOLFOX, along with nivolumab, every 2 weeks. Patient was recommended mirtazapine 15mg daily as appetite stimulant. She was informed that she can take Ensure three times daily. PET scan to be scheduled in the next few weeks to evaluate for disease status and treatment response. Patient was advised to inform us if she were to notice any worsening of her neuropathy (neuropathy appears to be mild at this time, Grade 1) so that we can dose reduce oxaliplatin even further. Patient was recommended to follow-up in office prior to C13.     Past Medical History:   has a past medical history of Anemia, Arthritis, Breast cancer (HCC) (12/17/2018), Cancer (HCC), Encounter for screening for HIV (07/07/2022), Follow-up examination (04/04/2023), GERD (gastroesophageal reflux disease), Hyperlipidemia, Hypertension, Obesity, Stroke (HCC), Stroke (HCC), and Transaminitis (09/22/2021).    PAST SURGICAL HISTORY:   has a past surgical history that includes US guided breast biopsy right complete (Right, 11/23/2018); Breast surgery; pr mastectomy partial w/axillary lymphadenectomy (Right, 12/20/2018); Tubal ligation; Breast biopsy (Right, 11/23/2018); US guided injection for research study (12/20/2018); US guided injection for research study (12/7/2018); US guided injection for research study (5/3/2019); US guided lymph node biopsy right (5/26/2023); IR biopsy liver mass (6/27/2023); pr laryngoscopy w/biopsy microscope/telescope (N/A, 7/20/2023); pr [facility] incl fluor gdnce dx w/cell washg spx (N/A, 7/20/2023); ESOPHAGOSCOPY (N/A, 7/20/2023); and IR port placement (7/28/2023).    CURRENT MEDICATIONS  Current Outpatient Medications   Medication Sig Dispense Refill    anastrozole (ARIMIDEX) 1 mg tablet Take 1 tablet (1 mg total) by mouth daily 30 tablet 0    atorvastatin (LIPITOR) 40  mg tablet Take 1 tablet (40 mg total) by mouth daily at bedtime 30 tablet 2    Cyanocobalamin (B-12 PO) Take by mouth      docusate sodium (COLACE) 50 mg capsule Take 1 capsule (50 mg total) by mouth 2 (two) times a day 90 capsule 1    ergocalciferol (VITAMIN D2) 50,000 units Take 1 capsule (50,000 Units total) by mouth once a week 90 capsule 3    [START ON 1/2/2024] fluorouracil 5,090 mg in CADD/Elastomeric Infusion Device Infuse 5,090 mg (1,200 mg/m2/day x 2.12 m2) into a catheter in a vein via infusion device over 46 hours for 2 days  Infusion planned for January 2, 2024. 1 each 0    folic acid (FOLVITE) 1 mg tablet Take 1 tablet (1 mg total) by mouth in the morning 90 tablet 3    lidocaine-prilocaine (EMLA) cream Apply topically as needed for mild pain 30 g 3    losartan (COZAAR) 50 mg tablet Take 1 tablet (50 mg total) by mouth daily 90 tablet 1    mirtazapine (REMERON) 15 mg tablet Take 1 tablet (15 mg total) by mouth daily at bedtime 30 tablet 1    omeprazole (PriLOSEC) 20 mg delayed release capsule Take 1 capsule (20 mg total) by mouth daily 30 capsule 5    ondansetron (ZOFRAN) 8 mg tablet Take 1 tablet (8 mg total) by mouth every 8 (eight) hours as needed for nausea or vomiting 30 tablet 3    potassium chloride (K-DUR,KLOR-CON) 20 mEq tablet Take 1 tablet (20 mEq total) by mouth 2 (two) times a day 60 tablet 1    prochlorperazine (COMPAZINE) 10 mg tablet Take 1 tablet (10 mg total) by mouth every 6 (six) hours as needed for nausea or vomiting 30 tablet 3    VITAMIN D PO Take 50,000 Units by mouth once a week      Magnesium Oxide 400 MG CAPS Take 1 tablet (400 mg total) by mouth 2 (two) times a day (Patient not taking: Reported on 12/1/2023) 14 capsule 0     No current facility-administered medications for this visit.     [unfilled]    SOCIAL HISTORY:   reports that she has never smoked. She has never been exposed to tobacco smoke. She has never used smokeless tobacco. She reports that she does not drink  "alcohol and does not use drugs.     FAMILY HISTORY:  family history includes Colon cancer (age of onset: 58) in her mother; Hypertension in her daughter, mother, and son; Pancreatic cancer (age of onset: 60) in her father.     ALLERGIES:  has No Known Allergies.      Physical Exam:  Vital Signs:   Visit Vitals  /90 (BP Location: Left arm, Patient Position: Sitting, Cuff Size: Adult)   Pulse (!) 111   Temp (!) 97 °F (36.1 °C)   Resp 18   Ht 5' 5\" (1.651 m)   Wt 103 kg (227 lb)   SpO2 97%   BMI 37.77 kg/m²   OB Status Postmenopausal   Smoking Status Never   BSA 2.09 m²     Body mass index is 37.77 kg/m².  Body surface area is 2.09 meters squared.    Physical Exam  Constitutional:       General: She is not in acute distress.     Appearance: Normal appearance. She is not toxic-appearing.   HENT:      Head: Normocephalic and atraumatic.      Mouth/Throat:      Mouth: Mucous membranes are moist.      Pharynx: No oropharyngeal exudate or posterior oropharyngeal erythema.   Eyes:      General: No scleral icterus.     Extraocular Movements: Extraocular movements intact.      Conjunctiva/sclera: Conjunctivae normal.   Cardiovascular:      Rate and Rhythm: Normal rate and regular rhythm.      Heart sounds: No murmur heard.     No gallop.   Pulmonary:      Effort: No respiratory distress.      Breath sounds: Normal breath sounds. No stridor. No wheezing or rhonchi.   Abdominal:      General: Bowel sounds are normal. There is no distension.      Palpations: Abdomen is soft. There is no mass.      Tenderness: There is no abdominal tenderness.   Musculoskeletal:         General: No swelling, tenderness, deformity or signs of injury.      Cervical back: No rigidity.      Right lower leg: No edema.      Left lower leg: No edema.   Lymphadenopathy:      Cervical: No cervical adenopathy.   Skin:     Capillary Refill: Capillary refill takes less than 2 seconds.      Coloration: Skin is not jaundiced or pale.      Findings: No " bruising or erythema.   Neurological:      General: No focal deficit present.      Mental Status: She is oriented to person, place, and time.      Motor: No weakness.         Labs:  Lab Results   Component Value Date    WBC 4.50 12/15/2023    HGB 8.2 (L) 12/15/2023    HCT 25.7 (L) 12/15/2023    MCV 88 12/15/2023    PLT 77 (L) 12/15/2023     Lab Results   Component Value Date    SODIUM 139 12/15/2023    K 3.3 (L) 12/15/2023     12/15/2023    CO2 25 12/15/2023    AGAP 8 12/15/2023    BUN 7 12/15/2023    CREATININE 0.71 12/15/2023    GLUC 132 12/15/2023    GLUF 110 (H) 08/19/2023    CALCIUM 9.3 12/15/2023    AST 75 (H) 12/15/2023    ALT 51 12/15/2023    ALKPHOS 137 (H) 12/15/2023    TP 7.2 12/15/2023    TBILI 0.51 12/15/2023    EGFR 88 12/15/2023

## 2024-01-02 ENCOUNTER — PATIENT OUTREACH (OUTPATIENT)
Dept: HEMATOLOGY ONCOLOGY | Facility: CLINIC | Age: 69
End: 2024-01-02

## 2024-01-02 ENCOUNTER — APPOINTMENT (OUTPATIENT)
Dept: LAB | Facility: HOSPITAL | Age: 69
End: 2024-01-02
Payer: MEDICARE

## 2024-01-02 ENCOUNTER — TELEPHONE (OUTPATIENT)
Dept: NUTRITION | Facility: CLINIC | Age: 69
End: 2024-01-02

## 2024-01-02 DIAGNOSIS — C78.7 METASTATIC COLON CANCER TO LIVER (HCC): ICD-10-CM

## 2024-01-02 DIAGNOSIS — C18.9 METASTATIC COLON CANCER TO LIVER (HCC): ICD-10-CM

## 2024-01-02 LAB
BASOPHILS # BLD AUTO: 0.07 THOUSANDS/ÂΜL (ref 0–0.1)
BASOPHILS NFR BLD AUTO: 1 % (ref 0–1)
EOSINOPHIL # BLD AUTO: 0.3 THOUSAND/ÂΜL (ref 0–0.61)
EOSINOPHIL NFR BLD AUTO: 3 % (ref 0–6)
ERYTHROCYTE [DISTWIDTH] IN BLOOD BY AUTOMATED COUNT: 24.2 % (ref 11.6–15.1)
HCT VFR BLD AUTO: 32.5 % (ref 34.8–46.1)
HGB BLD-MCNC: 9.9 G/DL (ref 11.5–15.4)
IMM GRANULOCYTES # BLD AUTO: 0.1 THOUSAND/UL (ref 0–0.2)
IMM GRANULOCYTES NFR BLD AUTO: 1 % (ref 0–2)
LYMPHOCYTES # BLD AUTO: 2 THOUSANDS/ÂΜL (ref 0.6–4.47)
LYMPHOCYTES NFR BLD AUTO: 19 % (ref 14–44)
MCH RBC QN AUTO: 27.7 PG (ref 26.8–34.3)
MCHC RBC AUTO-ENTMCNC: 30.5 G/DL (ref 31.4–37.4)
MCV RBC AUTO: 91 FL (ref 82–98)
MONOCYTES # BLD AUTO: 1.25 THOUSAND/ÂΜL (ref 0.17–1.22)
MONOCYTES NFR BLD AUTO: 12 % (ref 4–12)
NEUTROPHILS # BLD AUTO: 7.07 THOUSANDS/ÂΜL (ref 1.85–7.62)
NEUTS SEG NFR BLD AUTO: 64 % (ref 43–75)
NRBC BLD AUTO-RTO: 0 /100 WBCS
PLATELET # BLD AUTO: 189 THOUSANDS/UL (ref 149–390)
PMV BLD AUTO: 10.2 FL (ref 8.9–12.7)
RBC # BLD AUTO: 3.57 MILLION/UL (ref 3.81–5.12)
TSH SERPL DL<=0.05 MIU/L-ACNC: 1.81 UIU/ML (ref 0.45–4.5)
WBC # BLD AUTO: 10.79 THOUSAND/UL (ref 4.31–10.16)

## 2024-01-02 PROCEDURE — 84443 ASSAY THYROID STIM HORMONE: CPT

## 2024-01-02 PROCEDURE — 85025 COMPLETE CBC W/AUTO DIFF WBC: CPT

## 2024-01-02 PROCEDURE — 36415 COLL VENOUS BLD VENIPUNCTURE: CPT

## 2024-01-02 NOTE — PROGRESS NOTES
Pt called stating her infusion appt for today has been cancelled and rescheduled to tomorrow, she wants to make sure she has transportation for tomorrows appt  E mail sent to [place] for confirmation

## 2024-01-02 NOTE — TELEPHONE ENCOUNTER
Maira was scheduled for an RD follow up appointment today, 1/2/24, during infusion. Infusion was canceled and rescheduled for tomorrow. RD called and left VM stating RD appointment today will be canceled, and will rescheduled for during infusion on 1/16/24. Encouraged her to call back if she would like to be seen sooner.

## 2024-01-03 ENCOUNTER — PREP FOR PROCEDURE (OUTPATIENT)
Dept: INTERVENTIONAL RADIOLOGY/VASCULAR | Facility: CLINIC | Age: 69
End: 2024-01-03

## 2024-01-03 ENCOUNTER — TELEPHONE (OUTPATIENT)
Dept: RADIOLOGY | Facility: HOSPITAL | Age: 69
End: 2024-01-03

## 2024-01-03 ENCOUNTER — HOSPITAL ENCOUNTER (OUTPATIENT)
Dept: INFUSION CENTER | Facility: CLINIC | Age: 69
Discharge: HOME | End: 2024-01-03
Payer: MEDICARE

## 2024-01-03 ENCOUNTER — HOSPITAL ENCOUNTER (OUTPATIENT)
Dept: RADIOLOGY | Facility: HOSPITAL | Age: 69
Discharge: HOME | End: 2024-01-03
Attending: INTERNAL MEDICINE | Admitting: RADIOLOGY
Payer: MEDICARE

## 2024-01-03 VITALS
HEIGHT: 65 IN | TEMPERATURE: 97.2 F | SYSTOLIC BLOOD PRESSURE: 134 MMHG | DIASTOLIC BLOOD PRESSURE: 81 MMHG | BODY MASS INDEX: 37.39 KG/M2 | HEART RATE: 98 BPM | RESPIRATION RATE: 18 BRPM | WEIGHT: 224.43 LBS

## 2024-01-03 DIAGNOSIS — C78.7 METASTATIC COLON CANCER TO LIVER (HCC): Primary | ICD-10-CM

## 2024-01-03 DIAGNOSIS — C18.9 METASTATIC COLON CANCER TO LIVER (HCC): ICD-10-CM

## 2024-01-03 DIAGNOSIS — C18.9 METASTATIC COLON CANCER TO LIVER (HCC): Primary | ICD-10-CM

## 2024-01-03 DIAGNOSIS — C78.7 METASTATIC COLON CANCER TO LIVER (HCC): ICD-10-CM

## 2024-01-03 LAB
ALBUMIN SERPL BCP-MCNC: 3.5 G/DL (ref 3.5–5)
ALP SERPL-CCNC: 128 U/L (ref 34–104)
ALT SERPL W P-5'-P-CCNC: 28 U/L (ref 7–52)
ANION GAP SERPL CALCULATED.3IONS-SCNC: 7 MMOL/L
AST SERPL W P-5'-P-CCNC: 52 U/L (ref 13–39)
BILIRUB SERPL-MCNC: 0.55 MG/DL (ref 0.2–1)
BUN SERPL-MCNC: 13 MG/DL (ref 5–25)
CALCIUM SERPL-MCNC: 9.2 MG/DL (ref 8.4–10.2)
CHLORIDE SERPL-SCNC: 107 MMOL/L (ref 96–108)
CO2 SERPL-SCNC: 27 MMOL/L (ref 21–32)
CREAT SERPL-MCNC: 1.34 MG/DL (ref 0.6–1.3)
GFR SERPL CREATININE-BSD FRML MDRD: 41 ML/MIN/1.73SQ M
GLUCOSE SERPL-MCNC: 115 MG/DL (ref 65–140)
POTASSIUM SERPL-SCNC: 3.7 MMOL/L (ref 3.5–5.3)
PROT SERPL-MCNC: 7.6 G/DL (ref 6.4–8.4)
SODIUM SERPL-SCNC: 141 MMOL/L (ref 135–147)

## 2024-01-03 PROCEDURE — 80053 COMPREHEN METABOLIC PANEL: CPT | Performed by: INTERNAL MEDICINE

## 2024-01-03 PROCEDURE — 36598 INJ W/FLUOR EVAL CV DEVICE: CPT

## 2024-01-03 PROCEDURE — 36598 INJ W/FLUOR EVAL CV DEVICE: CPT | Performed by: RADIOLOGY

## 2024-01-03 RX ORDER — SODIUM CHLORIDE 9 MG/ML
20 INJECTION, SOLUTION INTRAVENOUS ONCE AS NEEDED
Status: DISCONTINUED | OUTPATIENT
Start: 2024-01-03 | End: 2024-01-06 | Stop reason: HOSPADM

## 2024-01-03 RX ORDER — DEXTROSE MONOHYDRATE 50 MG/ML
20 INJECTION, SOLUTION INTRAVENOUS ONCE
Status: COMPLETED | OUTPATIENT
Start: 2024-01-03 | End: 2024-01-03

## 2024-01-03 RX ORDER — SODIUM CHLORIDE 9 MG/ML
75 INJECTION, SOLUTION INTRAVENOUS CONTINUOUS
OUTPATIENT
Start: 2024-01-03

## 2024-01-03 RX ORDER — SODIUM CHLORIDE 9 MG/ML
20 INJECTION, SOLUTION INTRAVENOUS ONCE AS NEEDED
OUTPATIENT
Start: 2024-01-09

## 2024-01-03 RX ORDER — DEXTROSE MONOHYDRATE 50 MG/ML
20 INJECTION, SOLUTION INTRAVENOUS ONCE
OUTPATIENT
Start: 2024-01-09

## 2024-01-03 RX ADMIN — ALTEPLASE 2 MG: 2.2 INJECTION, POWDER, LYOPHILIZED, FOR SOLUTION INTRAVENOUS at 13:48

## 2024-01-03 RX ADMIN — IOHEXOL 20 ML: 350 INJECTION, SOLUTION INTRAVENOUS at 17:07

## 2024-01-03 RX ADMIN — ALTEPLASE: 2.2 INJECTION, POWDER, LYOPHILIZED, FOR SOLUTION INTRAVENOUS at 16:12

## 2024-01-03 RX ADMIN — DEXAMETHASONE SODIUM PHOSPHATE: 10 INJECTION, SOLUTION INTRAMUSCULAR; INTRAVENOUS at 13:17

## 2024-01-03 RX ADMIN — DEXTROSE 20 ML/HR: 5 SOLUTION INTRAVENOUS at 13:17

## 2024-01-03 NOTE — BRIEF OP NOTE (RAD/CATH)
INTERVENTIONAL RADIOLOGY PROCEDURE NOTE    Date: 1/3/2024    Procedure: IR PORT CHECK AND TPA THROMBOLYSIS    Preoperative diagnosis:   1. Metastatic colon cancer to liver (HCC)       Postoperative diagnosis: Same.    Surgeon: Matthew Cordova MD     Assistant: None. No qualified resident was available.    Blood loss: minimal    Specimens: none    Findings: Fibrin sheath at catheter tip as well as thrombus and the catheter has retracted into the neck.  TPA was only partially successful but the fibrin sheath is still present.  The catheter can be aspirated partially and does flush.  Plan:  Return for port catheter stripping and pull down.    Complications: None immediate.    Anesthesia: local

## 2024-01-03 NOTE — PLAN OF CARE
Problem: INFECTION - ADULT  Goal: Absence or prevention of progression during hospitalization  Description: INTERVENTIONS:  - Assess and monitor for signs and symptoms of infection  - Monitor lab/diagnostic results  - Monitor all insertion sites, i.e. indwelling lines, tubes, and drains  - Monitor endotracheal if appropriate and nasal secretions for changes in amount and color  - [place] appropriate cooling/warming therapies per order  - Administer medications as ordered  - Instruct and encourage patient and family to use good hand hygiene technique  - Identify and instruct in appropriate isolation precautions for identified infection/condition  1/3/2024 1315 by Enedina Brown RN  Outcome: Progressing  1/3/2024 1315 by Enedina Brown RN  Outcome: Progressing  Goal: Absence of fever/infection during neutropenic period  Description: INTERVENTIONS:  - Monitor WBC    1/3/2024 1315 by Enedina Brown RN  Outcome: Progressing  1/3/2024 1315 by Endeina Brown RN  Outcome: Progressing     Problem: Knowledge Deficit  Goal: Patient/family/caregiver demonstrates understanding of disease process, treatment plan, medications, and discharge instructions  Description: Complete learning assessment and assess knowledge base.  Interventions:  - Provide teaching at level of understanding  - Provide teaching via preferred learning methods  1/3/2024 1315 by Enedina Brown RN  Outcome: Progressing  1/3/2024 1315 by Enedina Brown RN  Outcome: Progressing

## 2024-01-03 NOTE — SEDATION DOCUMENTATION
The Port catheter check demonstrated a fibrin sheath at the end of the catheter.  Dr. Cordova ordered an alteplase infusion and re-check in 1hr.

## 2024-01-03 NOTE — PROGRESS NOTES
Patient arrived to appointment without concerns. Port accessed and blood return noted initially. When drawing back for labs, blood return was not present. Cath arlene instilled for 30 min and blood return not present. Hanane Diehl RN with Dr Koroma office notified, patient rescheduled to tomorrow with pump DC at [place] on 1/6 and will be going for a port study with IR today. CMP drawn peripherally. Patient aware of plan.

## 2024-01-03 NOTE — PLAN OF CARE
Problem: INFECTION - ADULT  Goal: Absence or prevention of progression during hospitalization  Description: INTERVENTIONS:  - Assess and monitor for signs and symptoms of infection  - Monitor lab/diagnostic results  - Monitor all insertion sites, i.e. indwelling lines, tubes, and drains  - Monitor endotracheal if appropriate and nasal secretions for changes in amount and color  - [place] appropriate cooling/warming therapies per order  - Administer medications as ordered  - Instruct and encourage patient and family to use good hand hygiene technique  - Identify and instruct in appropriate isolation precautions for identified infection/condition  Outcome: Progressing  Goal: Absence of fever/infection during neutropenic period  Description: INTERVENTIONS:  - Monitor WBC    Outcome: Progressing     Problem: Knowledge Deficit  Goal: Patient/family/caregiver demonstrates understanding of disease process, treatment plan, medications, and discharge instructions  Description: Complete learning assessment and assess knowledge base.  Interventions:  - Provide teaching at level of understanding  - Provide teaching via preferred learning methods  Outcome: Progressing

## 2024-01-04 ENCOUNTER — HOSPITAL ENCOUNTER (OUTPATIENT)
Dept: INFUSION CENTER | Facility: CLINIC | Age: 69
Discharge: HOME | End: 2024-01-04

## 2024-01-04 ENCOUNTER — TELEPHONE (OUTPATIENT)
Dept: HEMATOLOGY ONCOLOGY | Facility: CLINIC | Age: 69
End: 2024-01-04

## 2024-01-04 ENCOUNTER — TELEPHONE (OUTPATIENT)
Dept: FAMILY MEDICINE CLINIC | Facility: CLINIC | Age: 69
End: 2024-01-04

## 2024-01-04 DIAGNOSIS — C18.9 METASTATIC COLON CANCER TO LIVER (HCC): Primary | ICD-10-CM

## 2024-01-04 DIAGNOSIS — C78.7 METASTATIC COLON CANCER TO LIVER (HCC): Primary | ICD-10-CM

## 2024-01-04 NOTE — TELEPHONE ENCOUNTER
Lvm with appt dates and time, patient aware schedule is updated on myct. Patient has my teams number to return my call and confirm appts.

## 2024-01-04 NOTE — TELEPHONE ENCOUNTER
Left VM for patient to let her know that we need to cancel her chemo treatment today since her port is not functioning properly. I also told her that her port stripping is scheduled for Monday at 10am. I informed her that we will have her chemo rescheduled to Tuesday after her port is fixed and the inf schedulers will be calling her with that appointment. I left my direct teams number for her to callback.

## 2024-01-04 NOTE — TELEPHONE ENCOUNTER
Sushant, my name is Carolyn Moura. My phone number is [phone]. I have an appointment today for 2:00. I wish to reschedule Date of birth [birth year]. OK. Thank you.      Issue resolved

## 2024-01-04 NOTE — TELEPHONE ENCOUNTER
Lvm with time and date of new infusion appt, patient aware schedule is updated on [employer]. Patient has my teams number to return my call and confirm appts.

## 2024-01-05 ENCOUNTER — TELEPHONE (OUTPATIENT)
Dept: RADIOLOGY | Facility: HOSPITAL | Age: 69
End: 2024-01-05

## 2024-01-05 NOTE — TELEPHONE ENCOUNTER
Lvm on answering machine with appts, patient has my teams number to return my call and confirm appts.

## 2024-01-05 NOTE — PRE-PROCEDURE INSTRUCTIONS
Pre-procedure Instructions for Interventional Radiology  [facility]  [address] [address]  INTERVENTIONAL RADIOLOGY [phone]    You are scheduled for a/an port stripping.    On Monday 1-8-24.    Your tentative arrival time is 1000.  [facility] will notify you the day before your procedure with the exact arrival time and the location to arrive.    To prepare for your procedure:  Please arrange for someone to drive you home after the procedure and stay with you until the next morning if you are instructed to do so.  This is typically for patients receiving some type of sedative or anesthetic for the procedure.  DO NOT EAT OR DRINK ANYTHING after midnight on the evening before your procedure including candy & gum.  ONLY SIPS OF WATER with your medications are allowed on the morning of your procedure.  TAKE ALL OF YOUR REGULAR MEDICATIONS THE MORNING OF YOUR PROCEDURE with sips of water!  We may call you to stop some of your blood sugar, blood pressure and blood thinning medications depending on the procedure.  Please take all of these medications unless we instruct you to stop them.  If you have an allergy to x-ray dye, please contact Interventional Radiology for an x-ray dye preparation which usually consists of an oral steroid and Benadryl.    The day of your procedure:  Bring a list of the medications you take at home.  Bring medications you take for breathing problems (such as inhalers), medications for chest pain, or both.  Bring a case for your glasses or contacts.  Bring your insurance card and a form of photo ID.  Please leave all valuables such as credit cards and jewelry at home.  Report to the registration desk in the main lobby at the [facility], Entrance B.  Ask to be directed to [facility].  While your procedure is being performed, your family may wait in the Radiology Waiting Room on the 1st floor in Radiology.  if they need to leave, they may provide a  number to be called following the procedure.   Be prepared to stay overnight just in case. Sometimes procedures will indicate the need for further observation or treatment.   If you are scheduled for a follow-up visit with the Interventional Radiologist after your procedure, you will be called with a date and time.    Special Instructions (Medications to stop taking before your procedure etc.):

## 2024-01-06 ENCOUNTER — APPOINTMENT (OUTPATIENT)
Dept: LAB | Facility: HOSPITAL | Age: 69
End: 2024-01-06
Payer: MEDICARE

## 2024-01-06 DIAGNOSIS — C78.7 METASTATIC COLON CANCER TO LIVER (HCC): ICD-10-CM

## 2024-01-06 DIAGNOSIS — C18.9 METASTATIC COLON CANCER TO LIVER (HCC): ICD-10-CM

## 2024-01-06 LAB
ALBUMIN SERPL BCP-MCNC: 3.5 G/DL (ref 3.5–5)
ALP SERPL-CCNC: 121 U/L (ref 34–104)
ALT SERPL W P-5'-P-CCNC: 35 U/L (ref 7–52)
ANION GAP SERPL CALCULATED.3IONS-SCNC: 11 MMOL/L
AST SERPL W P-5'-P-CCNC: 67 U/L (ref 13–39)
BASOPHILS # BLD AUTO: 0.06 THOUSANDS/ÂΜL (ref 0–0.1)
BASOPHILS NFR BLD AUTO: 1 % (ref 0–1)
BILIRUB SERPL-MCNC: 0.47 MG/DL (ref 0.2–1)
BUN SERPL-MCNC: 10 MG/DL (ref 5–25)
CALCIUM SERPL-MCNC: 9.2 MG/DL (ref 8.4–10.2)
CHLORIDE SERPL-SCNC: 108 MMOL/L (ref 96–108)
CO2 SERPL-SCNC: 25 MMOL/L (ref 21–32)
CREAT SERPL-MCNC: 1.1 MG/DL (ref 0.6–1.3)
EOSINOPHIL # BLD AUTO: 0.21 THOUSAND/ÂΜL (ref 0–0.61)
EOSINOPHIL NFR BLD AUTO: 2 % (ref 0–6)
ERYTHROCYTE [DISTWIDTH] IN BLOOD BY AUTOMATED COUNT: 23.9 % (ref 11.6–15.1)
GFR SERPL CREATININE-BSD FRML MDRD: 52 ML/MIN/1.73SQ M
GLUCOSE P FAST SERPL-MCNC: 95 MG/DL (ref 65–99)
HCT VFR BLD AUTO: 31.7 % (ref 34.8–46.1)
HGB BLD-MCNC: 9.6 G/DL (ref 11.5–15.4)
IMM GRANULOCYTES # BLD AUTO: 0.05 THOUSAND/UL (ref 0–0.2)
IMM GRANULOCYTES NFR BLD AUTO: 1 % (ref 0–2)
LYMPHOCYTES # BLD AUTO: 2.86 THOUSANDS/ÂΜL (ref 0.6–4.47)
LYMPHOCYTES NFR BLD AUTO: 29 % (ref 14–44)
MCH RBC QN AUTO: 28.3 PG (ref 26.8–34.3)
MCHC RBC AUTO-ENTMCNC: 30.3 G/DL (ref 31.4–37.4)
MCV RBC AUTO: 94 FL (ref 82–98)
MONOCYTES # BLD AUTO: 1.18 THOUSAND/ÂΜL (ref 0.17–1.22)
MONOCYTES NFR BLD AUTO: 12 % (ref 4–12)
NEUTROPHILS # BLD AUTO: 5.51 THOUSANDS/ÂΜL (ref 1.85–7.62)
NEUTS SEG NFR BLD AUTO: 55 % (ref 43–75)
NRBC BLD AUTO-RTO: 0 /100 WBCS
PLATELET # BLD AUTO: 252 THOUSANDS/UL (ref 149–390)
PMV BLD AUTO: 10.2 FL (ref 8.9–12.7)
POTASSIUM SERPL-SCNC: 4.1 MMOL/L (ref 3.5–5.3)
PROT SERPL-MCNC: 7.6 G/DL (ref 6.4–8.4)
RBC # BLD AUTO: 3.39 MILLION/UL (ref 3.81–5.12)
SODIUM SERPL-SCNC: 144 MMOL/L (ref 135–147)
TSH SERPL DL<=0.05 MIU/L-ACNC: 2.27 UIU/ML (ref 0.45–4.5)
WBC # BLD AUTO: 9.87 THOUSAND/UL (ref 4.31–10.16)

## 2024-01-06 PROCEDURE — 36415 COLL VENOUS BLD VENIPUNCTURE: CPT

## 2024-01-06 PROCEDURE — 80053 COMPREHEN METABOLIC PANEL: CPT

## 2024-01-06 PROCEDURE — 84443 ASSAY THYROID STIM HORMONE: CPT

## 2024-01-06 PROCEDURE — 85025 COMPLETE CBC W/AUTO DIFF WBC: CPT

## 2024-01-08 ENCOUNTER — TELEPHONE (OUTPATIENT)
Dept: RADIOLOGY | Facility: HOSPITAL | Age: 69
End: 2024-01-08

## 2024-01-08 ENCOUNTER — PATIENT OUTREACH (OUTPATIENT)
Dept: HEMATOLOGY ONCOLOGY | Facility: CLINIC | Age: 69
End: 2024-01-08

## 2024-01-08 ENCOUNTER — TELEPHONE (OUTPATIENT)
Dept: CARDIOLOGY CLINIC | Facility: CLINIC | Age: 69
End: 2024-01-08

## 2024-01-08 NOTE — PROGRESS NOTES
Pt called stating her treatment has been cancelled for 1/9/2024, but she is having a procedure done, and wants to make sure she has transportation to the procedure. Her daughter will be bringing her home because she may be having anesthesia. E mail sent to [place] to set up transportation  E mail confirmation received from  [place], she is set up for transportation to her procedure at the [facility] for a 10am arrival   Pt is aware, and thankful for the assistance

## 2024-01-08 NOTE — TELEPHONE ENCOUNTER
LMOM to confirm appointment and if she needs [facility] transport or if she is coming with family to her appointment.

## 2024-01-09 ENCOUNTER — HOSPITAL ENCOUNTER (OUTPATIENT)
Dept: INFUSION CENTER | Facility: CLINIC | Age: 69
Discharge: HOME | End: 2024-01-09

## 2024-01-09 ENCOUNTER — HOSPITAL ENCOUNTER (OUTPATIENT)
Dept: RADIOLOGY | Facility: HOSPITAL | Age: 69
Discharge: HOME | End: 2024-01-09
Attending: RADIOLOGY
Payer: MEDICARE

## 2024-01-09 VITALS
HEIGHT: 66 IN | OXYGEN SATURATION: 96 % | SYSTOLIC BLOOD PRESSURE: 142 MMHG | WEIGHT: 231 LBS | BODY MASS INDEX: 37.12 KG/M2 | TEMPERATURE: 97.2 F | RESPIRATION RATE: 16 BRPM | HEART RATE: 97 BPM | DIASTOLIC BLOOD PRESSURE: 68 MMHG

## 2024-01-09 DIAGNOSIS — C18.9 METASTATIC COLON CANCER TO LIVER (HCC): ICD-10-CM

## 2024-01-09 DIAGNOSIS — C78.7 METASTATIC COLON CANCER TO LIVER (HCC): ICD-10-CM

## 2024-01-09 LAB
INR PPP: 1.11 (ref 0.84–1.19)
PROTHROMBIN TIME: 14.2 SECONDS (ref 11.6–14.5)

## 2024-01-09 PROCEDURE — C1773 RET DEV, INSERTABLE: HCPCS

## 2024-01-09 PROCEDURE — 36595 MECH REMOV TUNNELED CV CATH: CPT

## 2024-01-09 PROCEDURE — 36595 MECH REMOV TUNNELED CV CATH: CPT | Performed by: RADIOLOGY

## 2024-01-09 PROCEDURE — 85610 PROTHROMBIN TIME: CPT | Performed by: RADIOLOGY

## 2024-01-09 PROCEDURE — 36010 PLACE CATHETER IN VEIN: CPT | Performed by: RADIOLOGY

## 2024-01-09 PROCEDURE — 75901 REMOVE CVA DEVICE OBSTRUCT: CPT | Performed by: RADIOLOGY

## 2024-01-09 PROCEDURE — 99152 MOD SED SAME PHYS/QHP 5/>YRS: CPT

## 2024-01-09 PROCEDURE — 76937 US GUIDE VASCULAR ACCESS: CPT

## 2024-01-09 PROCEDURE — C1887 CATHETER, GUIDING: HCPCS

## 2024-01-09 PROCEDURE — C1894 INTRO/SHEATH, NON-LASER: HCPCS

## 2024-01-09 PROCEDURE — C1769 GUIDE WIRE: HCPCS

## 2024-01-09 PROCEDURE — 99152 MOD SED SAME PHYS/QHP 5/>YRS: CPT | Performed by: RADIOLOGY

## 2024-01-09 PROCEDURE — C1766 INTRO/SHEATH,STRBLE,NON-PEEL: HCPCS

## 2024-01-09 PROCEDURE — 75901 REMOVE CVA DEVICE OBSTRUCT: CPT

## 2024-01-09 RX ORDER — CEFAZOLIN SODIUM 2 G/50ML
2000 SOLUTION INTRAVENOUS ONCE
Status: COMPLETED | OUTPATIENT
Start: 2024-01-09 | End: 2024-01-09

## 2024-01-09 RX ORDER — MIDAZOLAM HYDROCHLORIDE 2 MG/2ML
INJECTION, SOLUTION INTRAMUSCULAR; INTRAVENOUS AS NEEDED
Status: COMPLETED | OUTPATIENT
Start: 2024-01-09 | End: 2024-01-09

## 2024-01-09 RX ORDER — LIDOCAINE WITH 8.4% SOD BICARB 0.9%(10ML)
SYRINGE (ML) INJECTION AS NEEDED
Status: COMPLETED | OUTPATIENT
Start: 2024-01-09 | End: 2024-01-09

## 2024-01-09 RX ORDER — SODIUM CHLORIDE 9 MG/ML
75 INJECTION, SOLUTION INTRAVENOUS CONTINUOUS
Status: DISCONTINUED | OUTPATIENT
Start: 2024-01-09 | End: 2024-01-10 | Stop reason: HOSPADM

## 2024-01-09 RX ORDER — FENTANYL CITRATE 50 UG/ML
INJECTION, SOLUTION INTRAMUSCULAR; INTRAVENOUS AS NEEDED
Status: COMPLETED | OUTPATIENT
Start: 2024-01-09 | End: 2024-01-09

## 2024-01-09 RX ADMIN — IOHEXOL 45 ML: 350 INJECTION, SOLUTION INTRAVENOUS at 13:37

## 2024-01-09 RX ADMIN — Medication 10 ML: at 12:25

## 2024-01-09 RX ADMIN — FENTANYL CITRATE 50 MCG: 50 INJECTION INTRAMUSCULAR; INTRAVENOUS at 12:25

## 2024-01-09 RX ADMIN — MIDAZOLAM 1 MG: 1 INJECTION INTRAMUSCULAR; INTRAVENOUS at 12:25

## 2024-01-09 RX ADMIN — CEFAZOLIN SODIUM 2000 MG: 2 SOLUTION INTRAVENOUS at 12:13

## 2024-01-09 RX ADMIN — SODIUM CHLORIDE 75 ML/HR: 0.9 INJECTION, SOLUTION INTRAVENOUS at 09:55

## 2024-01-09 NOTE — BRIEF OP NOTE (RAD/CATH)
INTERVENTIONAL RADIOLOGY PROCEDURE NOTE    Date: 1/9/2024    Procedure:   Procedure Summary       Date: 01/09/24 Room / Location: [facility] Interventional Radiology    Anesthesia Start:  Anesthesia Stop:     Procedure: IR PORT STRIPPING Diagnosis:       Metastatic colon cancer to liver (HCC)      (Port flushing but not aspirating.  Retracted in the neck and has a fibrin sheath which was resistant to TPA infusion)    Scheduled Providers:  Responsible Provider:     Anesthesia Type: Not recorded ASA Status: Not recorded            Preoperative diagnosis:   1. Metastatic colon cancer to liver (HCC)         Postoperative diagnosis: Same.    Surgeon: Cherri Gerard MD     Assistant: None. No qualified resident was available.    Blood loss: 3 mL    Specimens: None    Findings: Successful port stripping via right femoral access.    Complications: None immediate.    Anesthesia: conscious sedation

## 2024-01-09 NOTE — SEDATION DOCUMENTATION
Port stripping performed by Dr. Gerard. Pt tolerated procedure well. Manual pressure held over access and then covered with a dry dressing after hemostasis was achieved. Bed rest start time is 1330  [place] [facility] [place]

## 2024-01-09 NOTE — DISCHARGE INSTRUCTIONS
"                                                                                                                                              Port Stripping     Mediport (also known as port or  portacath ) is a commonly placed central venous access in patients. Fibrin sheath formation around the central venous catheter is a common biological response leading to port malfunction in the form of inability to aspirate but preserved capacity for infusion of fluids. If fibrinolytic therapy fails, percutaneous fibrin sheath stripping via transfemoral route or replacement with a new mediport are routine/conventional treatments for a fibrin sheath.    Today you had a Port Catheter Stripping:  We accessed your Common Femoral Vein and placed wires, catheters, and devices used to \"Strip the Fibrin\" away from the Port Catheter.    DISCHARGE INSTRUCTIONS:     Wound care: Keep your wound clean and dry.Band aid may come off in 24 hours.     Self-care:   Limit activity: Do not lift, pull or push heavy objects for 24 hours. Slowly start to do more each day. Return to your daily activities as directed.   Resume your normal diet. Small sips of flat soda will help with nausea.    Contact Interventional Radiology at [phone] ([facility] PATIENTS: Contact Interventional Radiology at [phone]) ([facility] PATIENTS: Contact Interventional Radiology at [phone]) if:  You have a fever.   You have chills, a cough, or feel weak and achy.  Persistent nausea or vomiting.   Your wound is red, swollen, or draining pus.   You have questions or concerns about your condition.  Optional filters can and should  be removed when you no longer need it.  Please call Interventional Radiology to make your removal appointment.                                                                                                                               "

## 2024-01-09 NOTE — INTERVAL H&P NOTE
"The history and physical were reviewed, along with progress notes, and the patient was examined. There are no changes since it was written.    /82   Pulse 93   Temp 97.8 °F (36.6 °C) (Temporal)   Resp 16   Ht 5' 6\" (1.676 m)   Wt 105 kg (231 lb)   SpO2 100%   BMI 37.28 kg/m²        Cherri Gerard MD/January 9, 2024/1:18 PM  "

## 2024-01-10 ENCOUNTER — PATIENT OUTREACH (OUTPATIENT)
Dept: HEMATOLOGY ONCOLOGY | Facility: CLINIC | Age: 69
End: 2024-01-10

## 2024-01-10 ENCOUNTER — HOSPITAL ENCOUNTER (OUTPATIENT)
Dept: INFUSION CENTER | Facility: CLINIC | Age: 69
Discharge: HOME | End: 2024-01-10
Payer: MEDICARE

## 2024-01-10 VITALS
BODY MASS INDEX: 36.21 KG/M2 | HEIGHT: 66 IN | WEIGHT: 225.31 LBS | HEART RATE: 108 BPM | DIASTOLIC BLOOD PRESSURE: 73 MMHG | RESPIRATION RATE: 18 BRPM | SYSTOLIC BLOOD PRESSURE: 105 MMHG | TEMPERATURE: 97 F

## 2024-01-10 DIAGNOSIS — C78.7 METASTATIC COLON CANCER TO LIVER (HCC): Primary | ICD-10-CM

## 2024-01-10 DIAGNOSIS — C18.9 METASTATIC COLON CANCER TO LIVER (HCC): Primary | ICD-10-CM

## 2024-01-10 PROCEDURE — G0498 CHEMO EXTEND IV INFUS W/PUMP: HCPCS

## 2024-01-10 PROCEDURE — 96411 CHEMO IV PUSH ADDL DRUG: CPT

## 2024-01-10 PROCEDURE — 96413 CHEMO IV INFUSION 1 HR: CPT

## 2024-01-10 PROCEDURE — 96368 THER/DIAG CONCURRENT INF: CPT

## 2024-01-10 PROCEDURE — 96367 TX/PROPH/DG ADDL SEQ IV INF: CPT

## 2024-01-10 PROCEDURE — 96417 CHEMO IV INFUS EACH ADDL SEQ: CPT

## 2024-01-10 PROCEDURE — 96415 CHEMO IV INFUSION ADDL HR: CPT

## 2024-01-10 RX ORDER — SODIUM CHLORIDE 9 MG/ML
20 INJECTION, SOLUTION INTRAVENOUS ONCE AS NEEDED
Status: DISCONTINUED | OUTPATIENT
Start: 2024-01-10 | End: 2024-01-13 | Stop reason: HOSPADM

## 2024-01-10 RX ORDER — FLUOROURACIL 50 MG/ML
320 INJECTION, SOLUTION INTRAVENOUS ONCE
Status: COMPLETED | OUTPATIENT
Start: 2024-01-10 | End: 2024-01-10

## 2024-01-10 RX ORDER — DEXTROSE MONOHYDRATE 50 MG/ML
20 INJECTION, SOLUTION INTRAVENOUS ONCE
Status: COMPLETED | OUTPATIENT
Start: 2024-01-10 | End: 2024-01-10

## 2024-01-10 RX ADMIN — OXALIPLATIN 150 MG: 5 INJECTION, SOLUTION INTRAVENOUS at 13:41

## 2024-01-10 RX ADMIN — DEXAMETHASONE SODIUM PHOSPHATE: 10 INJECTION, SOLUTION INTRAMUSCULAR; INTRAVENOUS at 12:22

## 2024-01-10 RX ADMIN — SODIUM CHLORIDE 20 ML/HR: 0.9 INJECTION, SOLUTION INTRAVENOUS at 12:21

## 2024-01-10 RX ADMIN — LEUCOVORIN CALCIUM 850 MG: 500 INJECTION, POWDER, LYOPHILIZED, FOR SOLUTION INTRAMUSCULAR; INTRAVENOUS at 13:39

## 2024-01-10 RX ADMIN — SODIUM CHLORIDE 240 MG: 9 INJECTION, SOLUTION INTRAVENOUS at 12:56

## 2024-01-10 RX ADMIN — DEXTROSE 20 ML/HR: 5 SOLUTION INTRAVENOUS at 13:40

## 2024-01-10 RX ADMIN — FLUOROURACIL 680 MG: 50 INJECTION, SOLUTION INTRAVENOUS at 15:39

## 2024-01-10 NOTE — PROGRESS NOTES
Pt called stating she has not heard from  [facility]  for transportation to her treatment today.  I e mailed  [facility], was told they did not have her on the schedule for today but they will send a  LYFT  out for her. Pt was thankful for the assistance

## 2024-01-10 NOTE — PROGRESS NOTES
Pt tolerated treatment today without incident.  Pt connected to BRANDIE as ordered.  Pt instructed she should return to infusion center on Friday at 1400 for BRANDIE disconnect, neulasta and hydration.  Pt was provided with AVS.  [facility] was emailed regarding pickup today and also pt's 2 p.m. appointment on Friday.

## 2024-01-11 ENCOUNTER — OFFICE VISIT (OUTPATIENT)
Dept: HEMATOLOGY ONCOLOGY | Facility: CLINIC | Age: 69
End: 2024-01-11
Payer: MEDICARE

## 2024-01-11 ENCOUNTER — TELEPHONE (OUTPATIENT)
Dept: HEMATOLOGY ONCOLOGY | Facility: CLINIC | Age: 69
End: 2024-01-11

## 2024-01-11 VITALS
WEIGHT: 231 LBS | HEIGHT: 65 IN | BODY MASS INDEX: 38.49 KG/M2 | DIASTOLIC BLOOD PRESSURE: 80 MMHG | TEMPERATURE: 98 F | HEART RATE: 96 BPM | RESPIRATION RATE: 18 BRPM | SYSTOLIC BLOOD PRESSURE: 130 MMHG | OXYGEN SATURATION: 97 %

## 2024-01-11 DIAGNOSIS — Z17.0 MALIGNANT NEOPLASM OF UPPER-OUTER QUADRANT OF RIGHT BREAST IN FEMALE, ESTROGEN RECEPTOR POSITIVE: ICD-10-CM

## 2024-01-11 DIAGNOSIS — C50.411 MALIGNANT NEOPLASM OF UPPER-OUTER QUADRANT OF RIGHT BREAST IN FEMALE, ESTROGEN RECEPTOR POSITIVE: ICD-10-CM

## 2024-01-11 DIAGNOSIS — C18.9 METASTATIC COLON CANCER TO LIVER (HCC): ICD-10-CM

## 2024-01-11 DIAGNOSIS — G62.9 NEUROPATHY: Primary | ICD-10-CM

## 2024-01-11 DIAGNOSIS — C09.9 RIGHT TONSILLAR SQUAMOUS CELL CARCINOMA (HCC): ICD-10-CM

## 2024-01-11 DIAGNOSIS — C78.7 METASTATIC COLON CANCER TO LIVER (HCC): ICD-10-CM

## 2024-01-11 PROCEDURE — 99214 OFFICE O/P EST MOD 30 MIN: CPT

## 2024-01-11 RX ORDER — LANOLIN ALCOHOL/MO/W.PET/CERES
50 CREAM (GRAM) TOPICAL DAILY
Qty: 90 TABLET | Refills: 0 | Status: SHIPPED | OUTPATIENT
Start: 2024-01-11

## 2024-01-11 NOTE — PROGRESS NOTES
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  - grade 2  - %  - NJ 95%  - HER2 negative     2018 Surgery    Right partial mastectomy and SLN biopsy:  Infiltrating ductal carcinoma, Grade 2/3, felt to be between 2.0 cm and 2.5 cm in greatest dimension  - No invasive tumor is seen at inked margins, but there is a focus of DCIS seen within approximately 1mm of the inked medial margin  - no LVI  - no LCIS  - 0/2 LN's  at least Stage IIA - pT2, pN0, cM0, G2.    - Dr Coronado     2018 -  Cancer Staged    Stage IIA - pT2, pN0, cM0, G2.       2019 Genomic Testing    Oncotype DX score: 13     2019 -  Hormone Therapy    anastrozole 1 mg daily as adjuvant endocrine therapy    - Dr Daley       2019 - 4/3/2019 Radiation    Course: C1    Plan ID Energy Fractions Dose per Fraction (cGy) Dose Correction (cGy) Total Dose Delivered (cGy) Elapsed Days   R Breast 10X/6X 25 / 25 200 0 5,000 40   R BRST BOOST 16E 5 / 5 200 0 1,000 7      Treatment dates:  C1: 2019 -  "4/3/2019       Metastatic colon cancer to liver (HCC)   7/6/2023 Genetic Testing    CARIS Results:   KRAS Pathogenic Variant Exon 2  p.G12D : lack of benefit of cetuximab, panitumumab Level 2   No other actionable mutation; MSI stable; TMB low   MiFOLOXAi Results: \"Decreased Benefit of FOLFOX + Bevacizumab in first line metastatic CRC.  This patient may achieve improved results by receiving an alternative to FOLFOX, such as FOLFIRI, as their initial regimen. As an adjustment to frontline FOLFOXIRI following toxicity: This patient may achieve improved results by removing the oxaliplatin portion of their regimen\".         7/11/2023 Initial Diagnosis    Metastatic colon cancer to liver (HCC)     7/13/2023 -  Cancer Staged    Staging form: Colon and Rectum, AJCC 8th Edition  - Clinical stage from 7/13/2023: cT3, cN0, pM1 - Signed by Harika Medina DO on 9/28/2023  Total positive nodes: 0       7/31/2023 -  Chemotherapy    potassium chloride, 20 mEq, Intravenous, Once, 2 of 2 cycles  Administration: 20 mEq (11/6/2023), 20 mEq (11/20/2023)  alteplase (CATHFLO), 2 mg, Intracatheter, Every 1 Minute as needed, 12 of 15 cycles  Administration: 2 mg (1/3/2024)  pegfilgrastim (NEULASTA), 6 mg, Subcutaneous, Once, 6 of 9 cycles  Administration: 6 mg (10/25/2023), 6 mg (11/8/2023), 6 mg (11/22/2023), 6 mg (12/6/2023), 6 mg (12/20/2023)  fluorouracil (ADRUCIL), 895 mg, Intravenous, Once, 12 of 15 cycles  Dose modification: 320 mg/m2 (original dose 400 mg/m2, Cycle 11)  Administration: 900 mg (7/31/2023), 900 mg (8/14/2023), 900 mg (8/28/2023), 900 mg (9/11/2023), 900 mg (9/25/2023), 900 mg (10/9/2023), 900 mg (10/23/2023), 895 mg (11/6/2023), 895 mg (11/20/2023), 895 mg (12/4/2023), 700 mg (12/18/2023), 680 mg (1/10/2024)  nivolumab (OPDIVO) IVPB, 240 mg (100 % of original dose 240 mg), Intravenous, Once, 4 of 7 cycles  Dose modification: 240 mg (original dose 240 mg, Cycle 9)  Administration: 240 mg (11/20/2023), 240 mg " (12/4/2023), 240 mg (12/18/2023), 240 mg (1/10/2024)  leucovorin calcium IVPB, 896 mg, Intravenous, Once, 12 of 15 cycles  Administration: 900 mg (7/31/2023), 900 mg (8/14/2023), 900 mg (8/28/2023), 900 mg (9/11/2023), 900 mg (9/25/2023), 900 mg (10/9/2023), 900 mg (10/23/2023), 900 mg (11/6/2023), 900 mg (11/20/2023), 900 mg (12/4/2023), 900 mg (12/18/2023), 850 mg (1/10/2024)  oxaliplatin (ELOXATIN) chemo infusion, 85 mg/m2 = 190.4 mg, Intravenous, Once, 12 of 15 cycles  Dose modification: 68 mg/m2 (original dose 85 mg/m2, Cycle 11, Reason: Other (Must fill in a comment), Comment: increased fatigue)  Administration: 190.4 mg (7/31/2023), 190.4 mg (8/14/2023), 200 mg (8/28/2023), 200 mg (9/11/2023), 200 mg (9/25/2023), 200 mg (10/9/2023), 200 mg (10/23/2023), 200 mg (11/6/2023), 200 mg (11/20/2023), 190.4 mg (12/4/2023), 150 mg (12/18/2023), 150 mg (1/10/2024)  fluorouracil (ADRUCIL) ambulatory infusion Soln, 1,200 mg/m2/day = 5,375 mg, Intravenous, Over 46 hours, 12 of 15 cycles     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HCC)   7/27/2023 Initial Diagnosis    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HCC)     7/27/2023 -  Cancer Staged    Staging form: Pharynx - Oropharynx, AJCC 8th Edition  - Clinical stage from 7/27/2023: Stage III (cT1, cN3, cM0, p16+) - Signed by Evan Plummer MD on 7/27/2023  Stage prefix: Initial diagnosis           Interval History:  Patient presents for a follow up of her metastatic colon cancer and squamous cell cancer of the tonsil.  Patient's cycle 12 of FOLFOX + nivolumab was deferred due to requiring a port check, which was completed on 1/9/23.  Port site assessed, chemo connected and dressing intact. Patient received cycle 12 of  FOLFOX + nivolumab yesterday (1/10/24).  She will have her growth factor and hydration tomorrow (1/12/24).    She reports increased fatigue but tolerable.  She is having persistent neuropathy b/l LE and UE.  Reports having trouble buttoning up her  shirt and is dropping things.  Reports no falls, however, catches herself. Patient is having mild constipation, has a BM every other day and requires dulcolax PRN.  She denies nausea, vomiting, fevers, infections or mouth sores.    Patient is attempting to eat frequently however, due to altered taste, is having difficulty.  She reports she has increased Ensure intake to BID.           REVIEW OF SYSTEMS:  Please note that a 14-point review of systems was performed to include Constitutional, HEENT, Respiratory, CVS, GI, , Musculoskeletal, Integumentary, Neurologic, Rheumatologic, Endocrinologic, Psychiatric, Lymphatic, and Hematologic/Oncologic systems were reviewed and are negative unless otherwise stated in HPI. Positive and negative findings pertinent to this evaluation are incorporated into the history of present illness.      ECOG PS: 1    PROBLEM LIST:  Patient Active Problem List   Diagnosis    Primary hypertension    Vasomotor rhinitis    Mixed hyperlipidemia    Screen for colon cancer    Malignant neoplasm of right female breast (HCC)    Vitamin D deficiency    Prediabetes    Cervical lymphadenopathy    Right thyroid nodule    GERD (gastroesophageal reflux disease)    Obesity    Metastatic colon cancer to liver (HCC)    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HCC)    Poor appetite    Anemia    Body mass index (BMI) 40.0-44.9, adult (HCC)    Dehydration    Drug-induced constipation    Chemotherapy induced neutropenia     Stage 3 chronic kidney disease, unspecified whether stage 3a or 3b CKD (HCC)    Platelets decreased (HCC)       Assessment / Plan: Discussed with Dr. Medina, patient is due for cycle 13 of FOLFOX + nivolumab on 1/24/24.  She will see Dr. Medina in the office 1/23/24 to re-assess her symptoms prior to her next dose.  In the meantime, discussed starting vitamin B6 50 mg daily for her neuropathy.  Patient agreeable, script sent to her pharmacy.    We discussed increasing her food intake,  trying small meals throughout the day.  We also discussed increasing ensure intake to TID, provided samples.  Patient aware to contact us for worsening symptoms or for additional questions/concerns.      I spent 30 minutes on chart review, face to face counseling time, coordination of care and documentation.    Past Medical History:   has a past medical history of Anemia, Arthritis, Breast cancer (HCC) (12/17/2018), Cancer (HCC), Encounter for screening for HIV (07/07/2022), Follow-up examination (04/04/2023), GERD (gastroesophageal reflux disease), Hyperlipidemia, Hypertension, Obesity, Stroke (HCC), Stroke (HCC), and Transaminitis (09/22/2021).    PAST SURGICAL HISTORY:   has a past surgical history that includes US guided breast biopsy right complete (Right, 11/23/2018); Breast surgery; pr mastectomy partial w/axillary lymphadenectomy (Right, 12/20/2018); Tubal ligation; Breast biopsy (Right, 11/23/2018); US guided injection for research study (12/20/2018); US guided injection for research study (12/7/2018); US guided injection for research study (5/3/2019); US guided lymph node biopsy right (5/26/2023); IR biopsy liver mass (6/27/2023); pr laryngoscopy w/biopsy microscope/telescope (N/A, 7/20/2023); pr brnchsc incl fluor gdnce dx w/cell washg spx (N/A, 7/20/2023); ESOPHAGOSCOPY (N/A, 7/20/2023); IR port placement (7/28/2023); IR port check (1/3/2024); and IR port stripping (1/9/2024).    CURRENT MEDICATIONS  Current Outpatient Medications   Medication Sig Dispense Refill    pyridoxine (VITAMIN B6) 50 mg tablet Take 1 tablet (50 mg total) by mouth daily 90 tablet 0    anastrozole (ARIMIDEX) 1 mg tablet Take 1 tablet (1 mg total) by mouth daily 30 tablet 0    atorvastatin (LIPITOR) 40 mg tablet Take 1 tablet (40 mg total) by mouth daily at bedtime 30 tablet 2    Cyanocobalamin (B-12 PO) Take by mouth      docusate sodium (COLACE) 50 mg capsule Take 1 capsule (50 mg total) by mouth 2 (two) times a day 90 capsule 1     ergocalciferol (VITAMIN D2) 50,000 units Take 1 capsule (50,000 Units total) by mouth once a week 90 capsule 3    fluorouracil 5,090 mg in CADD/Elastomeric Infusion Device Infuse 5,090 mg (1,200 mg/m2/day x 2.12 m2) into a catheter in a vein via infusion device over 46 hours for 2 days  Infusion planned for January 9, 2024. 1 each 0    folic acid (FOLVITE) 1 mg tablet Take 1 tablet (1 mg total) by mouth in the morning 90 tablet 3    lidocaine-prilocaine (EMLA) cream Apply topically as needed for mild pain 30 g 3    losartan (COZAAR) 50 mg tablet Take 1 tablet (50 mg total) by mouth daily 90 tablet 1    Magnesium Oxide 400 MG CAPS Take 1 tablet (400 mg total) by mouth 2 (two) times a day 14 capsule 0    mirtazapine (REMERON) 15 mg tablet Take 1 tablet (15 mg total) by mouth daily at bedtime 30 tablet 1    omeprazole (PriLOSEC) 20 mg delayed release capsule Take 1 capsule (20 mg total) by mouth daily 30 capsule 5    ondansetron (ZOFRAN) 8 mg tablet Take 1 tablet (8 mg total) by mouth every 8 (eight) hours as needed for nausea or vomiting 30 tablet 3    potassium chloride (K-DUR,KLOR-CON) 20 mEq tablet Take 1 tablet (20 mEq total) by mouth 2 (two) times a day 60 tablet 1    prochlorperazine (COMPAZINE) 10 mg tablet Take 1 tablet (10 mg total) by mouth every 6 (six) hours as needed for nausea or vomiting 30 tablet 3    VITAMIN D PO Take 50,000 Units by mouth once a week       No current facility-administered medications for this visit.     Facility-Administered Medications Ordered in Other Visits   Medication Dose Route Frequency Provider Last Rate Last Admin    alteplase (CATHFLO) injection 2 mg  2 mg Intracatheter Q1MIN PRN Harika Medina, DO        sodium chloride 0.9 % infusion  20 mL/hr Intravenous Once PRN Harika Medina,    Stopped at 01/10/24 1339     [unfilled]    SOCIAL HISTORY:   reports that she has never smoked. She has never been exposed to tobacco smoke. She has never used smokeless tobacco. She  "reports that she does not drink alcohol and does not use drugs.     FAMILY HISTORY:  family history includes Colon cancer (age of onset: 58) in her mother; Hypertension in her daughter, mother, and son; Pancreatic cancer (age of onset: 60) in her father.     ALLERGIES:  has No Known Allergies.      Physical Exam:  Vital Signs:   Visit Vitals  /80 (BP Location: Right arm, Patient Position: Sitting, Cuff Size: Adult)   Pulse 96   Temp 98 °F (36.7 °C)   Resp 18   Ht 5' 5\" (1.651 m)   Wt 105 kg (231 lb)   SpO2 97%   BMI 38.44 kg/m²   OB Status Postmenopausal   Smoking Status Never   BSA 2.1 m²     Body mass index is 38.44 kg/m².  Body surface area is 2.1 meters squared.    GEN: Alert, awake oriented x3, in no acute distress  HEENT- No pallor, icterus, cyanosis, no oral mucosal lesions,   LAD - no palpable cervical, clavicle, axillary, inguinal LAD  Heart- normal S1 S2, regular rate and rhythm, No murmur, rubs.   Lungs- clear breathing sound bilateral.   Abdomen- soft, Non tender, bowel sounds present  Extremities- No cyanosis, clubbing, edema  Neuro- No focal neurological deficit    Labs:  Lab Results   Component Value Date    WBC 9.87 01/06/2024    HGB 9.6 (L) 01/06/2024    HCT 31.7 (L) 01/06/2024    MCV 94 01/06/2024     01/06/2024     Lab Results   Component Value Date    SODIUM 144 01/06/2024    K 4.1 01/06/2024     01/06/2024    CO2 25 01/06/2024    AGAP 11 01/06/2024    BUN 10 01/06/2024    CREATININE 1.10 01/06/2024    GLUC 115 01/03/2024    GLUF 95 01/06/2024    CALCIUM 9.2 01/06/2024    AST 67 (H) 01/06/2024    ALT 35 01/06/2024    ALKPHOS 121 (H) 01/06/2024    TP 7.6 01/06/2024    TBILI 0.47 01/06/2024    EGFR 52 01/06/2024     "

## 2024-01-11 NOTE — TELEPHONE ENCOUNTER
Informed patient of our plan after discussing her symptoms with DR. Medina.  The plan is she will see Dr. Medina prior to her next cycle, which is scheduled for 1/24.  Her appt with her is on 1/23/24.  At that time, Dr. Medina will re-assess her symptoms and determine the plan moving forward.  Patient voiced understanding.

## 2024-01-12 ENCOUNTER — OFFICE VISIT (OUTPATIENT)
Dept: CARDIOLOGY CLINIC | Facility: CLINIC | Age: 69
End: 2024-01-12
Payer: MEDICARE

## 2024-01-12 ENCOUNTER — HOSPITAL ENCOUNTER (OUTPATIENT)
Dept: INFUSION CENTER | Facility: CLINIC | Age: 69
End: 2024-01-12
Payer: MEDICARE

## 2024-01-12 ENCOUNTER — PATIENT OUTREACH (OUTPATIENT)
Dept: HEMATOLOGY ONCOLOGY | Facility: CLINIC | Age: 69
End: 2024-01-12

## 2024-01-12 VITALS
HEIGHT: 65 IN | BODY MASS INDEX: 37.99 KG/M2 | DIASTOLIC BLOOD PRESSURE: 86 MMHG | WEIGHT: 228 LBS | HEART RATE: 91 BPM | OXYGEN SATURATION: 98 % | SYSTOLIC BLOOD PRESSURE: 130 MMHG

## 2024-01-12 DIAGNOSIS — E78.2 MIXED HYPERLIPIDEMIA: ICD-10-CM

## 2024-01-12 DIAGNOSIS — Z51.81 ENCOUNTER FOR MONITORING CARDIOTOXIC DRUG THERAPY: ICD-10-CM

## 2024-01-12 DIAGNOSIS — I51.89 DIASTOLIC DYSFUNCTION: ICD-10-CM

## 2024-01-12 DIAGNOSIS — R06.09 DOE (DYSPNEA ON EXERTION): ICD-10-CM

## 2024-01-12 DIAGNOSIS — C18.9 METASTATIC COLON CANCER TO LIVER (HCC): Primary | ICD-10-CM

## 2024-01-12 DIAGNOSIS — C78.7 METASTATIC COLON CANCER TO LIVER (HCC): Primary | ICD-10-CM

## 2024-01-12 DIAGNOSIS — I10 PRIMARY HYPERTENSION: ICD-10-CM

## 2024-01-12 DIAGNOSIS — Z79.899 HIGH RISK MEDICATION USE: Primary | ICD-10-CM

## 2024-01-12 DIAGNOSIS — N18.30 STAGE 3 CHRONIC KIDNEY DISEASE, UNSPECIFIED WHETHER STAGE 3A OR 3B CKD (HCC): ICD-10-CM

## 2024-01-12 DIAGNOSIS — R42 LIGHTHEADEDNESS: ICD-10-CM

## 2024-01-12 DIAGNOSIS — Z79.899 ENCOUNTER FOR MONITORING CARDIOTOXIC DRUG THERAPY: ICD-10-CM

## 2024-01-12 PROCEDURE — 96360 HYDRATION IV INFUSION INIT: CPT

## 2024-01-12 PROCEDURE — 96372 THER/PROPH/DIAG INJ SC/IM: CPT

## 2024-01-12 PROCEDURE — 99214 OFFICE O/P EST MOD 30 MIN: CPT | Performed by: INTERNAL MEDICINE

## 2024-01-12 RX ADMIN — SODIUM CHLORIDE 1000 ML: 0.9 INJECTION, SOLUTION INTRAVENOUS at 14:10

## 2024-01-12 RX ADMIN — PEGFILGRASTIM 6 MG: 6 INJECTION SUBCUTANEOUS at 14:49

## 2024-01-12 NOTE — PROGRESS NOTES
Pt. Denied new symptoms or concerns today.  5FU Elastomeric pump completed over 46 hours as ordered.  Port flushed with excellent blood return. 1L NSS administered as ordered, pt tolerated well.  Future appointments reviewed, next appt 1/22/24 1130, AVS declined.

## 2024-01-12 NOTE — PROGRESS NOTES
Cardio-Oncology Clinic Note    Maira Moura 68 y.o. 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  Right sided.  S/p XRT  Colon cancer  Dx 2023  Metastatic to liver  Current therapy:  anastrazole  Opdivo (nivolumab) and FOLFOX  (started Nov 2023)    # Diastolic dysfunction on echo  Not unexpected echo finding given age, HTN, obesity  Reports mild MOSHER  Exam - euvolemic  BNP - normal  No medical therapy for this unless clinical CHF / volume overload    # lightheadedness  At times positional. But not always (can occur laying down).  Lasts a few minutes.  ? Hypovolemia.  Noted intermittent bump in Cr.  Admits doesn't drink much.   Exam - euvolemic  Rec increasing hydration  Given can occur at rest, and given the chemotherapy, if not resolved with hydration, will consider checkin ziopatch at future visit    # HTN  Losartan 50mg daily  BP acceptable  Continue to monitor    # HLD  Of note, does have coronary calcifications on CT  On Lipitor 40mg qhs  Last  which is quite high.  Recently started compliance with the statin.  So we will repeat lipids    # Obesity  BMI 38  Weight loss recommended    # Hx TIA  2017.    # CKD3  - noted per epic.  Fluctuating Cr's.  Improved.     # High risk medication use  On immunotherapy - risk of myocarditis which can be life-threatening.  Check baseline troponin.  On 5-FU - risk of coronary vasospasm.  Continue to monitor      Today's Plan Summary:  See above assessment/plan for full details of today's plan.  Briefly,     Still needs baseline troponin  Fasting lipids            Reason For Visit / Chief Complaint:  F/u diastolic dysfunction, HTN, HLD    HPI:   Maira Moura is a 68 y.o.  female with history as noted in the problem list and further detailed in the above assessment and plan.    Initial:   Dec  2023  Referred by Dr. Medina for positional lightheadedness from sitting to standing and diastolic dysfunction on echo.  As above, the patient has a history of breast cancer and more recently colon cancer and squamous cell carcinoma of the tonsil.  The patient has been started on immunotherapy and FOLFOX.  At the recent oncology visit she reported some positional lightheadedness.  An echo was obtained demonstrating diastolic dysfunction.   Today, the patient reports - fatigue is main complaint.   Also has lightheadedness.   Episodes last a few minutes.  Can be when just laying there.  Can also be positional.   Describes sx's as a dizziness.  Sx's started before chemotherapy but worse after starting.   No syncope.   No CP.   Some SOB.  Some MOSHER.  No palpitations.    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   Single.  5 kids.    Interval:  At initial visit -->  recommended  Check troponin  Check BNP  Improve hydration    BNP - 51  (normal)  The troponin I had ordered was not done.    Today - feeling about the same.  Tried hydrating better - helped dizziness a little bit.   No chest pain.  No syncope.  No leg edema.         Cardiac Imaging personally reviewed:  EKG 6/2023  NSR    12-12-23  Sinus tachycardia at 104 bpm.  Otherwise, normal EKG.       Holter or event monitor    Echo 10/2023  Mild LVH.  EF 65% (on my reivew).  Grade 1 diastolic dysfunction.  Normal RV size and function.  No significant valve disease         JAZMYN    Cardiac MRI    Stress testing    Coronary CTA or [facility]    CPET              Patient Active Problem List    Diagnosis Date Noted   • Diastolic dysfunction 01/12/2024   • Neuropathy 01/11/2024   • Platelets decreased ([facility]) 12/29/2023   • Stage 3 chronic kidney disease, unspecified whether stage 3a or 3b CKD ([facility]) 12/12/2023   • Chemotherapy induced neutropenia  11/09/2023   • Dehydration 10/06/2023   • Drug-induced constipation 10/06/2023   • Body mass index (BMI) 40.0-44.9, adult  (HCC) 09/22/2023   • Anemia 08/24/2023   • Poor appetite 08/03/2023   • 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HCC) 07/27/2023   • Metastatic colon cancer to liver (HCC) 07/11/2023   • GERD (gastroesophageal reflux disease)    • Obesity    • Right thyroid nodule 04/04/2023   • Cervical lymphadenopathy 03/21/2023   • Prediabetes 07/07/2022   • Vitamin D deficiency 01/25/2019   • Malignant neoplasm of right female breast (HCC) 12/20/2018   • Vasomotor rhinitis 08/09/2018   • Mixed hyperlipidemia 08/09/2018   • Screen for colon cancer 08/09/2018   • Primary hypertension 07/27/2017       Past Medical History:   Diagnosis Date   • Anemia    • Arthritis    • Breast cancer (HCC) 12/17/2018   • Cancer (HCC)     right breast, colon, liver, right tonsil   • Encounter for screening for HIV 07/07/2022   • Follow-up examination 04/04/2023   • GERD (gastroesophageal reflux disease)    • Hyperlipidemia    • Hypertension    • Obesity    • Stroke (HCC)     TIA    • Stroke (HCC)     TIA    • Transaminitis 09/22/2021       No Known Allergies    Current Outpatient Medications   Medication Instructions   • anastrozole (ARIMIDEX) 1 mg, Oral, Daily   • atorvastatin (LIPITOR) 40 mg, Oral, Daily at bedtime   • Cyanocobalamin (B-12 PO) Oral   • docusate sodium (COLACE) 50 mg, Oral, 2 times daily   • ergocalciferol (VITAMIN D2) 50,000 Units, Oral, Weekly   • fluorouracil 5,090 mg in CADD/Elastomeric Infusion Device 1,200 mg/m2/day, Intravenous, Over 46 hours   • folic acid (FOLVITE) 1 mg, Oral, Daily   • lidocaine-prilocaine (EMLA) cream Topical, As needed   • losartan (COZAAR) 50 mg, Oral, Daily   • Magnesium Oxide 400 mg, Oral, 2 times daily   • mirtazapine (REMERON) 15 mg, Oral, Daily at bedtime   • omeprazole (PRILOSEC) 20 mg, Oral, Daily   • ondansetron (ZOFRAN) 8 mg, Oral, Every 8 hours PRN   • potassium chloride (K-DUR,KLOR-CON) 20 mEq tablet 20 mEq, Oral, 2 times daily   • prochlorperazine (COMPAZINE) 10 mg, Oral, Every 6 hours PRN  "  • pyridoxine (VITAMIN B6) 50 mg, Oral, Daily   • VITAMIN D PO 50,000 Units, Oral, Weekly       Social History     Socioeconomic History   • Marital status: Single     Spouse name: Not on file   • Number of children: Not on file   • Years of education: Not on file   • Highest education level: Not on file   Occupational History   • Not on file   Tobacco Use   • Smoking status: Never     Passive exposure: Never   • Smokeless tobacco: Never   • Tobacco comments:     NO TOBACCO USE   Vaping Use   • Vaping status: Never Used   Substance and Sexual Activity   • Alcohol use: No   • Drug use: No   • Sexual activity: Not on file   Other Topics Concern   • Not on file   Social History Narrative   • Not on file     Social Determinants of Health     Financial Resource Strain: Low Risk  (10/9/2023)    Overall Financial Resource Strain (CARDIA)    • Difficulty of Paying Living Expenses: Not hard at all   Food Insecurity: No Food Insecurity (10/9/2023)    Hunger Vital Sign    • Worried About Running Out of Food in the Last Year: Never true    • Ran Out of Food in the Last Year: Never true   Transportation Needs: No Transportation Needs (10/9/2023)    PRAPARE - Transportation    • Lack of Transportation (Medical): No    • Lack of Transportation (Non-Medical): No   Physical Activity: Not on file   Stress: Not on file   Social Connections: Not on file   Intimate Partner Violence: Not on file   Housing Stability: Not on file       Family History   Problem Relation Age of Onset   • Colon cancer Mother 58   • Hypertension Mother    • Pancreatic cancer Father 60   • Hypertension Daughter    • Hypertension Son        Physical Exam:  Blood pressure 130/86, pulse 91, height 5' 5\" (1.651 m), weight 103 kg (228 lb), SpO2 98%, not currently breastfeeding.  Body mass index is 37.94 kg/m².  Wt Readings from Last 3 Encounters:   01/12/24 103 kg (228 lb)   01/11/24 105 kg (231 lb)   01/10/24 102 kg (225 lb 5 oz)     Physical Exam  Vitals " "reviewed.   Constitutional:       General: She is not in acute distress.     Appearance: She is not toxic-appearing.   Neck:      Vascular: No carotid bruit.      Comments: No JVD  Cardiovascular:      Rate and Rhythm: Normal rate and regular rhythm.      Heart sounds: No murmur heard.     No friction rub. No gallop.   Pulmonary:      Breath sounds: Normal breath sounds. No wheezing, rhonchi or rales.   Abdominal:      General: There is no distension.      Palpations: Abdomen is soft.      Tenderness: There is no abdominal tenderness. There is no guarding.   Musculoskeletal:      Right lower leg: No edema.      Left lower leg: No edema.   Neurological:      Mental Status: She is alert.         Labs & Results:  Lab Results   Component Value Date    SODIUM 144 01/06/2024    K 4.1 01/06/2024     01/06/2024    CO2 25 01/06/2024    BUN 10 01/06/2024    CREATININE 1.10 01/06/2024    GLUC 115 01/03/2024    CALCIUM 9.2 01/06/2024     No results found for: \"NTBNP\"       Thank you for the opportunity to participate in the care of this patient.    Matthew Whitfield MD, [facility]  Staff Cardiologist  Director of Cardio-Oncology  [facility]  "

## 2024-01-12 NOTE — PLAN OF CARE
Problem: Potential for Falls  Goal: Patient will remain free of falls  Description: INTERVENTIONS:  - Educate patient/family on patient safety including physical limitations  - Instruct patient to call for assistance with activity   - Consult OT/PT to assist with strengthening/mobility   - Keep Call bell within reach  - Keep bed low and locked with side rails adjusted as appropriate  - Keep care items and personal belongings within reach  - Initiate and maintain comfort rounds  - Make Fall Risk Sign visible to staff  - - Apply yellow socks and bracelet for high fall risk patients  - Consider moving patient to room near nurses station  Outcome: Progressing

## 2024-01-12 NOTE — PROGRESS NOTES
Pt called stating she had an oncology cardiology appt today, and has not heard from  [facility] about [facility].  E mail sent to  [facility], and they will send a  LYFT to her. She is also already set up for transportation for her 2pm infusion appointment. She understood, and was thankful for the assistance

## 2024-01-17 RX ORDER — FLUOROURACIL 50 MG/ML
320 INJECTION, SOLUTION INTRAVENOUS ONCE
Status: CANCELLED | OUTPATIENT
Start: 2024-01-23

## 2024-01-17 RX ORDER — SODIUM CHLORIDE 9 MG/ML
20 INJECTION, SOLUTION INTRAVENOUS ONCE AS NEEDED
Status: CANCELLED | OUTPATIENT
Start: 2024-01-23

## 2024-01-17 RX ORDER — DEXTROSE MONOHYDRATE 50 MG/ML
20 INJECTION, SOLUTION INTRAVENOUS ONCE
Status: CANCELLED | OUTPATIENT
Start: 2024-01-23

## 2024-01-18 ENCOUNTER — PATIENT OUTREACH (OUTPATIENT)
Dept: HEMATOLOGY ONCOLOGY | Facility: CLINIC | Age: 69
End: 2024-01-18

## 2024-01-18 DIAGNOSIS — C78.7 METASTATIC COLON CANCER TO LIVER (HCC): Primary | ICD-10-CM

## 2024-01-18 DIAGNOSIS — C18.9 METASTATIC COLON CANCER TO LIVER (HCC): Primary | ICD-10-CM

## 2024-01-18 NOTE — PROGRESS NOTES
Pt called to confirm [facility]  has her new infusion schedule. E mail sent to  [facility] to make sure she is set up for her infusion appts

## 2024-01-19 ENCOUNTER — PATIENT OUTREACH (OUTPATIENT)
Dept: HEMATOLOGY ONCOLOGY | Facility: CLINIC | Age: 69
End: 2024-01-19

## 2024-01-19 NOTE — PROGRESS NOTES
Pt called stating her infusion appt for Monday 1/22/2024 has changed to 9am, she also has a PET CT the same day at 1pm.  She wants to make sure she has transportation to and from both appointments.  E mail sent [facility] for confirmation   Spoke with the [facility]. She will be seen there at 11:30 then go to her PET afterwards all via  [facility]   E mail confirmation received from  [facility]

## 2024-01-22 ENCOUNTER — PATIENT OUTREACH (OUTPATIENT)
Dept: HEMATOLOGY ONCOLOGY | Facility: CLINIC | Age: 69
End: 2024-01-22

## 2024-01-22 ENCOUNTER — HOSPITAL ENCOUNTER (OUTPATIENT)
Dept: INFUSION CENTER | Facility: CLINIC | Age: 69
Discharge: HOME | End: 2024-01-22
Payer: MEDICARE

## 2024-01-22 ENCOUNTER — HOSPITAL ENCOUNTER (OUTPATIENT)
Dept: RADIOLOGY | Age: 69
Discharge: HOME | End: 2024-01-22
Payer: MEDICARE

## 2024-01-22 DIAGNOSIS — C18.9 METASTATIC COLON CANCER TO LIVER (HCC): Primary | ICD-10-CM

## 2024-01-22 DIAGNOSIS — C78.7 METASTATIC COLON CANCER TO LIVER (HCC): Primary | ICD-10-CM

## 2024-01-22 DIAGNOSIS — C78.7 METASTATIC COLON CANCER TO LIVER (HCC): ICD-10-CM

## 2024-01-22 DIAGNOSIS — C18.9 METASTATIC COLON CANCER TO LIVER (HCC): ICD-10-CM

## 2024-01-22 LAB
ALBUMIN SERPL BCP-MCNC: 3.5 G/DL (ref 3.5–5)
ALP SERPL-CCNC: 123 U/L (ref 34–104)
ALT SERPL W P-5'-P-CCNC: 44 U/L (ref 7–52)
ANION GAP SERPL CALCULATED.3IONS-SCNC: 8 MMOL/L
AST SERPL W P-5'-P-CCNC: 85 U/L (ref 13–39)
BASOPHILS # BLD AUTO: 0.05 THOUSANDS/ÂΜL (ref 0–0.1)
BASOPHILS NFR BLD AUTO: 1 % (ref 0–1)
BILIRUB SERPL-MCNC: 0.53 MG/DL (ref 0.2–1)
BUN SERPL-MCNC: 6 MG/DL (ref 5–25)
CALCIUM SERPL-MCNC: 9.2 MG/DL (ref 8.4–10.2)
CHLORIDE SERPL-SCNC: 107 MMOL/L (ref 96–108)
CO2 SERPL-SCNC: 24 MMOL/L (ref 21–32)
CREAT SERPL-MCNC: 0.86 MG/DL (ref 0.6–1.3)
EOSINOPHIL # BLD AUTO: 0.46 THOUSAND/ÂΜL (ref 0–0.61)
EOSINOPHIL NFR BLD AUTO: 7 % (ref 0–6)
ERYTHROCYTE [DISTWIDTH] IN BLOOD BY AUTOMATED COUNT: 21.2 % (ref 11.6–15.1)
GFR SERPL CREATININE-BSD FRML MDRD: 70 ML/MIN/1.73SQ M
GLUCOSE SERPL-MCNC: 113 MG/DL (ref 65–140)
GLUCOSE SERPL-MCNC: 116 MG/DL (ref 65–140)
HCT VFR BLD AUTO: 29.4 % (ref 34.8–46.1)
HGB BLD-MCNC: 9.2 G/DL (ref 11.5–15.4)
IMM GRANULOCYTES # BLD AUTO: 0.12 THOUSAND/UL (ref 0–0.2)
IMM GRANULOCYTES NFR BLD AUTO: 2 % (ref 0–2)
LYMPHOCYTES # BLD AUTO: 1.9 THOUSANDS/ÂΜL (ref 0.6–4.47)
LYMPHOCYTES NFR BLD AUTO: 27 % (ref 14–44)
MCH RBC QN AUTO: 29.2 PG (ref 26.8–34.3)
MCHC RBC AUTO-ENTMCNC: 31.3 G/DL (ref 31.4–37.4)
MCV RBC AUTO: 93 FL (ref 82–98)
MONOCYTES # BLD AUTO: 0.77 THOUSAND/ÂΜL (ref 0.17–1.22)
MONOCYTES NFR BLD AUTO: 11 % (ref 4–12)
NEUTROPHILS # BLD AUTO: 3.74 THOUSANDS/ÂΜL (ref 1.85–7.62)
NEUTS SEG NFR BLD AUTO: 52 % (ref 43–75)
NRBC BLD AUTO-RTO: 1 /100 WBCS
PLATELET # BLD AUTO: 142 THOUSANDS/UL (ref 149–390)
PMV BLD AUTO: 11.1 FL (ref 8.9–12.7)
POTASSIUM SERPL-SCNC: 3.2 MMOL/L (ref 3.5–5.3)
PROT SERPL-MCNC: 7.7 G/DL (ref 6.4–8.4)
RBC # BLD AUTO: 3.15 MILLION/UL (ref 3.81–5.12)
SODIUM SERPL-SCNC: 139 MMOL/L (ref 135–147)
TSH SERPL DL<=0.05 MIU/L-ACNC: 2.45 UIU/ML (ref 0.45–4.5)
WBC # BLD AUTO: 7.04 THOUSAND/UL (ref 4.31–10.16)

## 2024-01-22 PROCEDURE — 78815 PET IMAGE W/CT SKULL-THIGH: CPT

## 2024-01-22 PROCEDURE — A9552 F18 FDG: HCPCS

## 2024-01-22 PROCEDURE — 82948 REAGENT STRIP/BLOOD GLUCOSE: CPT

## 2024-01-22 PROCEDURE — 85025 COMPLETE CBC W/AUTO DIFF WBC: CPT | Performed by: INTERNAL MEDICINE

## 2024-01-22 PROCEDURE — 80053 COMPREHEN METABOLIC PANEL: CPT | Performed by: INTERNAL MEDICINE

## 2024-01-22 PROCEDURE — 84443 ASSAY THYROID STIM HORMONE: CPT | Performed by: INTERNAL MEDICINE

## 2024-01-22 NOTE — PROGRESS NOTES
Port accessed for central labs. Labs drawn without complication and sent. Port flushed per protocol. AVS declined, left unit in stable condition.

## 2024-01-22 NOTE — PROGRESS NOTES
Pt called stating she has an 11:30am appointment today, and has not heard from  [facility] for a  time. E mail sent to [facility] and they confirmed she is on the schedule for both the infusion appointment and PET CT  appointment  Explained to pt that they will  be calling her shortly  She was thankful for the assistance

## 2024-01-23 ENCOUNTER — HOSPITAL ENCOUNTER (OUTPATIENT)
Dept: INFUSION CENTER | Facility: CLINIC | Age: 69
Discharge: HOME | End: 2024-01-23
Payer: MEDICARE

## 2024-01-23 ENCOUNTER — TELEPHONE (OUTPATIENT)
Dept: HEMATOLOGY ONCOLOGY | Facility: CLINIC | Age: 69
End: 2024-01-23

## 2024-01-23 ENCOUNTER — OFFICE VISIT (OUTPATIENT)
Dept: HEMATOLOGY ONCOLOGY | Facility: CLINIC | Age: 69
End: 2024-01-23
Payer: MEDICARE

## 2024-01-23 ENCOUNTER — TELEPHONE (OUTPATIENT)
Dept: NUTRITION | Facility: CLINIC | Age: 69
End: 2024-01-23

## 2024-01-23 VITALS
WEIGHT: 220.9 LBS | HEIGHT: 65 IN | HEART RATE: 103 BPM | TEMPERATURE: 97.6 F | DIASTOLIC BLOOD PRESSURE: 76 MMHG | RESPIRATION RATE: 18 BRPM | SYSTOLIC BLOOD PRESSURE: 114 MMHG | BODY MASS INDEX: 36.8 KG/M2

## 2024-01-23 VITALS
RESPIRATION RATE: 17 BRPM | BODY MASS INDEX: 36.96 KG/M2 | DIASTOLIC BLOOD PRESSURE: 88 MMHG | WEIGHT: 221.8 LBS | HEIGHT: 65 IN | SYSTOLIC BLOOD PRESSURE: 114 MMHG | HEART RATE: 113 BPM | OXYGEN SATURATION: 99 % | TEMPERATURE: 97.1 F

## 2024-01-23 DIAGNOSIS — C78.7 METASTATIC COLON CANCER TO LIVER (HCC): Primary | ICD-10-CM

## 2024-01-23 DIAGNOSIS — D69.6 PLATELETS DECREASED (HCC): ICD-10-CM

## 2024-01-23 DIAGNOSIS — C78.7 METASTATIC COLON CANCER TO LIVER (HCC): ICD-10-CM

## 2024-01-23 DIAGNOSIS — C18.9 METASTATIC COLON CANCER TO LIVER (HCC): ICD-10-CM

## 2024-01-23 DIAGNOSIS — C18.9 METASTATIC COLON CANCER TO LIVER (HCC): Primary | ICD-10-CM

## 2024-01-23 DIAGNOSIS — R94.8 ABNORMAL PET SCAN OF COLON: ICD-10-CM

## 2024-01-23 PROCEDURE — 96413 CHEMO IV INFUSION 1 HR: CPT

## 2024-01-23 PROCEDURE — G0498 CHEMO EXTEND IV INFUS W/PUMP: HCPCS

## 2024-01-23 PROCEDURE — 96367 TX/PROPH/DG ADDL SEQ IV INF: CPT

## 2024-01-23 PROCEDURE — 99214 OFFICE O/P EST MOD 30 MIN: CPT | Performed by: INTERNAL MEDICINE

## 2024-01-23 PROCEDURE — 96411 CHEMO IV PUSH ADDL DRUG: CPT

## 2024-01-23 RX ORDER — FLUOROURACIL 50 MG/ML
320 INJECTION, SOLUTION INTRAVENOUS ONCE
Status: COMPLETED | OUTPATIENT
Start: 2024-01-23 | End: 2024-01-23

## 2024-01-23 RX ORDER — FLUOROURACIL 50 MG/ML
320 INJECTION, SOLUTION INTRAVENOUS ONCE
OUTPATIENT
Start: 2024-02-06

## 2024-01-23 RX ORDER — MIRTAZAPINE 30 MG/1
30 TABLET, FILM COATED ORAL
Qty: 30 TABLET | Refills: 3 | Status: SHIPPED | OUTPATIENT
Start: 2024-01-23

## 2024-01-23 RX ORDER — SODIUM CHLORIDE 9 MG/ML
20 INJECTION, SOLUTION INTRAVENOUS ONCE AS NEEDED
OUTPATIENT
Start: 2024-02-06

## 2024-01-23 RX ORDER — DEXTROSE MONOHYDRATE 50 MG/ML
20 INJECTION, SOLUTION INTRAVENOUS ONCE
OUTPATIENT
Start: 2024-02-06

## 2024-01-23 RX ORDER — SODIUM CHLORIDE 9 MG/ML
20 INJECTION, SOLUTION INTRAVENOUS ONCE AS NEEDED
Status: DISCONTINUED | OUTPATIENT
Start: 2024-01-23 | End: 2024-01-26 | Stop reason: HOSPADM

## 2024-01-23 RX ORDER — DEXTROSE MONOHYDRATE 50 MG/ML
20 INJECTION, SOLUTION INTRAVENOUS ONCE
Status: DISCONTINUED | OUTPATIENT
Start: 2024-01-23 | End: 2024-01-26 | Stop reason: HOSPADM

## 2024-01-23 RX ADMIN — ONDANSETRON: 2 INJECTION INTRAMUSCULAR; INTRAVENOUS at 11:49

## 2024-01-23 RX ADMIN — SODIUM CHLORIDE 20 ML/HR: 0.9 INJECTION, SOLUTION INTRAVENOUS at 11:48

## 2024-01-23 RX ADMIN — LEUCOVORIN CALCIUM 850 MG: 350 INJECTION, POWDER, LYOPHILIZED, FOR SUSPENSION INTRAMUSCULAR; INTRAVENOUS at 12:56

## 2024-01-23 RX ADMIN — SODIUM CHLORIDE 240 MG: 9 INJECTION, SOLUTION INTRAVENOUS at 12:12

## 2024-01-23 RX ADMIN — FLUOROURACIL 680 MG: 50 INJECTION, SOLUTION INTRAVENOUS at 13:30

## 2024-01-23 NOTE — PROGRESS NOTES
Pt tolerated treatment without incident.  Pt connected to [facility] as ordered.  Pt instructed to return to infusion center on Thursday at 12:00 for [facility] disconnect.  [facility] dispatch made aware of appt change.  Pt was provided with AVS and is aware of appt on Thursday Jan 25 at 12:00.

## 2024-01-23 NOTE — PROGRESS NOTES
TIME OUT:  Received call from Stefany Mendoza RN stating Oxaliplatin will be discontinued from plan related to Neuropathy.  RN read back information provided. RN provided change in orders to Erin Dreyer / Pharmacist.

## 2024-01-23 NOTE — TELEPHONE ENCOUNTER
Maira was scheduled for an RD follow up appt today, 1/23/24, during infusion, but left prior to being seen by RD. Called Maira to reschedule appt. No answer. Left VM encouraging her to call back to reschedule. If RD doesn't hear back, will attempt to see her during one of her upcoming infusions.

## 2024-01-23 NOTE — PATIENT INSTRUCTIONS
Add 1 pill colace twice a day.  It is a stool softener.    Take remeron(mirtazipine), 2 pills at night until you use up the 15 mg pills.  A new prescription has been send to your pharmacy for 30 mg.

## 2024-01-23 NOTE — TELEPHONE ENCOUNTER
Title: Oxaliplatin discontinued from FOLFOX    Date patient scheduled: 1/23    Oxaliplatin 150mg discontinued from FOLFOX treatment starting C13 D1 due to neuropathy    Office RN notified patient?? Yes patient in office at time of change    Is the patient scheduled within 24 hours?? Yes INF RN made aware    Office RN to route to INF  pool. Infusion  pool routes to INF TECH POOL.    Infusion tech to receive message, confirm scheduled treatment duration matches ordered treatment duration or adjust accordingly, and re-link appointment request orders.    Infusion tech to notify pharmacy and finance.

## 2024-01-24 ENCOUNTER — RA CDI HCC (OUTPATIENT)
Dept: OTHER | Facility: HOSPITAL | Age: 69
End: 2024-01-24

## 2024-01-24 RX ORDER — OMEPRAZOLE 20 MG/1
20 CAPSULE, DELAYED RELEASE ORAL DAILY
Qty: 30 CAPSULE | Refills: 5 | Status: SHIPPED | OUTPATIENT
Start: 2024-01-24

## 2024-01-24 RX ORDER — ONDANSETRON HYDROCHLORIDE 8 MG/1
8 TABLET, FILM COATED ORAL EVERY 8 HOURS PRN
Qty: 30 TABLET | Refills: 3 | Status: SHIPPED | OUTPATIENT
Start: 2024-01-24

## 2024-01-24 RX ORDER — PROCHLORPERAZINE MALEATE 10 MG
10 TABLET ORAL EVERY 6 HOURS PRN
Qty: 30 TABLET | Refills: 3 | Status: SHIPPED | OUTPATIENT
Start: 2024-01-24

## 2024-01-24 NOTE — PROGRESS NOTES
E66.01, I13.0  HCC coding opportunities          Chart Reviewed number of suggestions sent to Provider: 2     Patients Insurance     Medicare Insurance: Medicare

## 2024-01-25 ENCOUNTER — HOSPITAL ENCOUNTER (OUTPATIENT)
Dept: INFUSION CENTER | Facility: CLINIC | Age: 69
Discharge: HOME | End: 2024-01-25
Payer: MEDICARE

## 2024-01-25 VITALS
RESPIRATION RATE: 16 BRPM | HEART RATE: 94 BPM | TEMPERATURE: 98.6 F | DIASTOLIC BLOOD PRESSURE: 77 MMHG | SYSTOLIC BLOOD PRESSURE: 114 MMHG

## 2024-01-25 DIAGNOSIS — C18.9 METASTATIC COLON CANCER TO LIVER (HCC): Primary | ICD-10-CM

## 2024-01-25 DIAGNOSIS — C78.7 METASTATIC COLON CANCER TO LIVER (HCC): Primary | ICD-10-CM

## 2024-01-25 PROCEDURE — 96360 HYDRATION IV INFUSION INIT: CPT

## 2024-01-25 PROCEDURE — 96372 THER/PROPH/DIAG INJ SC/IM: CPT

## 2024-01-25 RX ADMIN — PEGFILGRASTIM 6 MG: 6 INJECTION SUBCUTANEOUS at 13:21

## 2024-01-25 RX ADMIN — SODIUM CHLORIDE 1000 ML: 0.9 INJECTION, SOLUTION INTRAVENOUS at 12:05

## 2024-01-25 NOTE — PLAN OF CARE
Problem: INFECTION - ADULT  Goal: Absence or prevention of progression during hospitalization  Description: INTERVENTIONS:  - Assess and monitor for signs and symptoms of infection  - Monitor lab/diagnostic results  - Monitor all insertion sites, i.e. indwelling lines, tubes, and drains  - Monitor endotracheal if appropriate and nasal secretions for changes in amount and color  - McGill appropriate cooling/warming therapies per order  - Administer medications as ordered  - Instruct and encourage patient and family to use good hand hygiene technique  - Identify and instruct in appropriate isolation precautions for identified infection/condition  Outcome: Progressing  Goal: Absence of fever/infection during neutropenic period  Description: INTERVENTIONS:  - Monitor WBC    Outcome: Progressing     Problem: Knowledge Deficit  Goal: Patient/family/caregiver demonstrates understanding of disease process, treatment plan, medications, and discharge instructions  Description: Complete learning assessment and assess knowledge base.  Interventions:  - Provide teaching at level of understanding  - Provide teaching via preferred learning methods  Outcome: Progressing

## 2024-01-25 NOTE — PROGRESS NOTES
Pt. Denied new symptoms or concerns today.  5 Fu completed via  [facility].  Port flushed noting excellent blood return. iVF tolerated well as ordered without adverse event. Neulasta 6mg given SQ in ZAID. Pt. Tolerated well. Future appointment scheduled for labs on 2/5/24 at 1300.  Pt. Aware. AVS declined.

## 2024-01-25 NOTE — PROGRESS NOTES
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  - grade 2  - %  - MD 95%  - HER2 negative     2018 Surgery    Right partial mastectomy and SLN biopsy:  Infiltrating ductal carcinoma, Grade 2/3, felt to be between 2.0 cm and 2.5 cm in greatest dimension  - No invasive tumor is seen at inked margins, but there is a focus of DCIS seen within approximately 1mm of the inked medial margin  - no LVI  - no LCIS  - 0/2 LN's  at least Stage IIA - pT2, pN0, cM0, G2.    - Dr Coronado     2018 -  Cancer Staged    Stage IIA - pT2, pN0, cM0, G2.       2019 Genomic Testing    Oncotype DX score: 13     2019 -  Hormone Therapy    anastrozole 1 mg daily as adjuvant endocrine therapy    - Dr Daley       2019 - 4/3/2019 Radiation    Course: C1    Plan ID Energy Fractions Dose per Fraction (cGy) Dose Correction (cGy) Total  "Dose Delivered (cGy) Elapsed Days   R Breast 10X/6X 25 / 25 200 0 5,000 40   R BRST BOOST 16E 5 / 5 200 0 1,000 7      Treatment dates:  C1: 2/14/2019 - 4/3/2019       Metastatic colon cancer to liver (HCC)   7/6/2023 Genetic Testing    CARIS Results:   KRAS Pathogenic Variant Exon 2  p.G12D : lack of benefit of cetuximab, panitumumab Level 2   No other actionable mutation; MSI stable; TMB low   MiFOLOXAi Results: \"Decreased Benefit of FOLFOX + Bevacizumab in first line metastatic CRC.  This patient may achieve improved results by receiving an alternative to FOLFOX, such as FOLFIRI, as their initial regimen. As an adjustment to frontline FOLFOXIRI following toxicity: This patient may achieve improved results by removing the oxaliplatin portion of their regimen\".         7/11/2023 Initial Diagnosis    Metastatic colon cancer to liver (HCC)     7/13/2023 -  Cancer Staged    Staging form: Colon and Rectum, AJCC 8th Edition  - Clinical stage from 7/13/2023: cT3, cN0, pM1 - Signed by Harika Medina DO on 9/28/2023  Total positive nodes: 0       7/31/2023 -  Chemotherapy    potassium chloride, 20 mEq, Intravenous, Once, 2 of 2 cycles  Administration: 20 mEq (11/6/2023), 20 mEq (11/20/2023)  alteplase (CATHFLO), 2 mg, Intracatheter, Every 1 Minute as needed, 13 of 15 cycles  Administration: 2 mg (1/3/2024)  pegfilgrastim (NEULASTA), 6 mg, Subcutaneous, Once, 7 of 9 cycles  Administration: 6 mg (10/25/2023), 6 mg (11/8/2023), 6 mg (11/22/2023), 6 mg (12/6/2023), 6 mg (12/20/2023), 6 mg (1/12/2024), 6 mg (1/25/2024)  fluorouracil (ADRUCIL), 895 mg, Intravenous, Once, 13 of 15 cycles  Dose modification: 320 mg/m2 (original dose 400 mg/m2, Cycle 11)  Administration: 900 mg (7/31/2023), 900 mg (8/14/2023), 900 mg (8/28/2023), 900 mg (9/11/2023), 900 mg (9/25/2023), 900 mg (10/9/2023), 900 mg (10/23/2023), 895 mg (11/6/2023), 895 mg (11/20/2023), 895 mg (12/4/2023), 700 mg (12/18/2023), 680 mg (1/10/2024), 680 mg " (1/23/2024)  nivolumab (OPDIVO) IVPB, 240 mg (100 % of original dose 240 mg), Intravenous, Once, 5 of 7 cycles  Dose modification: 240 mg (original dose 240 mg, Cycle 9)  Administration: 240 mg (11/20/2023), 240 mg (12/4/2023), 240 mg (12/18/2023), 240 mg (1/10/2024), 240 mg (1/23/2024)  leucovorin calcium IVPB, 896 mg, Intravenous, Once, 13 of 15 cycles  Administration: 900 mg (7/31/2023), 900 mg (8/14/2023), 900 mg (8/28/2023), 900 mg (9/11/2023), 900 mg (9/25/2023), 900 mg (10/9/2023), 900 mg (10/23/2023), 900 mg (11/6/2023), 900 mg (11/20/2023), 900 mg (12/4/2023), 900 mg (12/18/2023), 850 mg (1/10/2024), 850 mg (1/23/2024)  oxaliplatin (ELOXATIN) chemo infusion, 85 mg/m2 = 190.4 mg, Intravenous, Once, 12 of 12 cycles  Dose modification: 68 mg/m2 (original dose 85 mg/m2, Cycle 11, Reason: Other (Must fill in a comment), Comment: increased fatigue)  Administration: 190.4 mg (7/31/2023), 190.4 mg (8/14/2023), 200 mg (8/28/2023), 200 mg (9/11/2023), 200 mg (9/25/2023), 200 mg (10/9/2023), 200 mg (10/23/2023), 200 mg (11/6/2023), 200 mg (11/20/2023), 190.4 mg (12/4/2023), 150 mg (12/18/2023), 150 mg (1/10/2024)  fluorouracil (ADRUCIL) ambulatory infusion Soln, 1,200 mg/m2/day = 5,375 mg, Intravenous, Over 46 hours, 13 of 15 cycles     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HCC)   7/27/2023 Initial Diagnosis    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HCC)     7/27/2023 -  Cancer Staged    Staging form: Pharynx - Oropharynx, AJCC 8th Edition  - Clinical stage from 7/27/2023: Stage III (cT1, cN3, cM0, p16+) - Signed by Evan Plummer MD on 7/27/2023  Stage prefix: Initial diagnosis           Interval History: Patient presents for follow up of her metastatic colon cancer and at least 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  She is status post 12 total cycles of FOLFOX with nivolumab added in November.  PET scan prior to this visit shows good control of all areas of disease with the exception  of increase in a colon mass.  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  She is dropping things and is having more burning numbness in her hands and feet.  She is also noticed increased constipation.  She has not noticed any bright red blood per rectum.  She continues to lose weight and as per my scale, is down 10 pounds since her last visit.  She denies any nausea or vomiting.  She also endorses some suprapubic abdominal pain without any urinary symptoms.         REVIEW OF SYSTEMS:  Please note that a 14-point review of systems was performed to include Constitutional, HEENT, Respiratory, CVS, GI, , Musculoskeletal, Integumentary, Neurologic, Rheumatologic, Endocrinologic, Psychiatric, Lymphatic, and Hematologic/Oncologic systems were reviewed and are negative unless otherwise stated in HPI. Positive and negative findings pertinent to this evaluation are incorporated into the history of present illness.      ECOG PS: 1    PROBLEM LIST:  Patient Active Problem List   Diagnosis    Primary hypertension    Vasomotor rhinitis    Mixed hyperlipidemia    Screen for colon cancer    Malignant neoplasm of right female breast (HCC)    Vitamin D deficiency    Prediabetes    Cervical lymphadenopathy    Right thyroid nodule    GERD (gastroesophageal reflux disease)    Obesity    Metastatic colon cancer to liver (HCC)    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HCC)    Poor appetite    Anemia    Body mass index (BMI) 40.0-44.9, adult (HCC)    Dehydration    Drug-induced constipation    Chemotherapy induced neutropenia     Stage 3 chronic kidney disease, unspecified whether stage 3a or 3b CKD (HCC)    Platelets decreased (HCC)    Neuropathy    Diastolic dysfunction       Assessment / Plan: 68-year-old female with metastatic colon cancer and at least 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  She has good disease control with the exception of her colon mass.  I am referring her back to Dr. Israel for  consideration of resection of the lesion.  This will interrupt her systemic therapy, but if we can avoid a change in some systemic therapy and avoid any impending obstruction I think there would be value in the surgery.  I certainly defer this to Dr. Israel.  In the meantime I am taking oxaliplatin out of her treatment plan because of neuropathy.  We will continue with 5-FU and nivolumab for now.  I am can increase her Remeron dose to 30 mg at night.  She thinks she did notice some improvement in her diet with the medication, but does continue to still lose weight.  I also asked her to add Colace to her bowel regimen.  I have put her on discussion for multidisciplinary tumor board as well.  I will plan on seeing her back in 1 month in follow-up.       I spent 30 minutes on chart review, face to face counseling time, coordination of care and documentation.    Past Medical History:   has a past medical history of Anemia, Arthritis, Breast cancer (HCC) (12/17/2018), Cancer (HCC), Encounter for screening for HIV (07/07/2022), Follow-up examination (04/04/2023), GERD (gastroesophageal reflux disease), Hyperlipidemia, Hypertension, Obesity, Stroke (HCC), Stroke (HCC), and Transaminitis (09/22/2021).    PAST SURGICAL HISTORY:   has a past surgical history that includes US guided breast biopsy right complete (Right, 11/23/2018); Breast surgery; pr mastectomy partial w/axillary lymphadenectomy (Right, 12/20/2018); Tubal ligation; Breast biopsy (Right, 11/23/2018); US guided injection for research study (12/20/2018); US guided injection for research study (12/7/2018); US guided injection for research study (5/3/2019); US guided lymph node biopsy right (5/26/2023); IR biopsy liver mass (6/27/2023); pr laryngoscopy w/biopsy microscope/telescope (N/A, 7/20/2023); pr brncc incl fluor gdnce dx w/cell washg spx (N/A, 7/20/2023); ESOPHAGOSCOPY (N/A, 7/20/2023); IR port placement (7/28/2023); IR port check (1/3/2024); and IR port  stripping (1/9/2024).    CURRENT MEDICATIONS  Current Outpatient Medications   Medication Sig Dispense Refill    anastrozole (ARIMIDEX) 1 mg tablet Take 1 tablet (1 mg total) by mouth daily 30 tablet 0    atorvastatin (LIPITOR) 40 mg tablet Take 1 tablet (40 mg total) by mouth daily at bedtime 30 tablet 2    Cyanocobalamin (B-12 PO) Take by mouth      docusate sodium (COLACE) 50 mg capsule Take 1 capsule (50 mg total) by mouth 2 (two) times a day 90 capsule 1    ergocalciferol (VITAMIN D2) 50,000 units Take 1 capsule (50,000 Units total) by mouth once a week 90 capsule 3    fluorouracil 5,090 mg in CADD/Elastomeric Infusion Device Infuse 5,090 mg (1,200 mg/m2/day x 2.12 m2) into a catheter in a vein via infusion device over 46 hours for 2 days  Infusion planned for January 23, 2024. 1 each 0    folic acid (FOLVITE) 1 mg tablet Take 1 tablet (1 mg total) by mouth in the morning 90 tablet 3    lidocaine-prilocaine (EMLA) cream Apply topically as needed for mild pain 30 g 3    losartan (COZAAR) 50 mg tablet Take 1 tablet (50 mg total) by mouth daily 90 tablet 1    Magnesium Oxide 400 MG CAPS Take 1 tablet (400 mg total) by mouth 2 (two) times a day 14 capsule 0    mirtazapine (REMERON) 30 mg tablet Take 1 tablet (30 mg total) by mouth daily at bedtime 30 tablet 3    potassium chloride (K-DUR,KLOR-CON) 20 mEq tablet Take 1 tablet (20 mEq total) by mouth 2 (two) times a day 60 tablet 1    pyridoxine (VITAMIN B6) 50 mg tablet Take 1 tablet (50 mg total) by mouth daily 90 tablet 0    VITAMIN D PO Take 50,000 Units by mouth once a week      omeprazole (PriLOSEC) 20 mg delayed release capsule Take 1 capsule (20 mg total) by mouth daily 30 capsule 5    ondansetron (ZOFRAN) 8 mg tablet Take 1 tablet (8 mg total) by mouth every 8 (eight) hours as needed for nausea or vomiting 30 tablet 3    prochlorperazine (COMPAZINE) 10 mg tablet Take 1 tablet (10 mg total) by mouth every 6 (six) hours as needed for nausea or vomiting 30  "tablet 3     No current facility-administered medications for this visit.     Facility-Administered Medications Ordered in Other Visits   Medication Dose Route Frequency Provider Last Rate Last Admin    alteplase (CATHFLO) injection 2 mg  2 mg Intracatheter Q1MIN PRN Harika Agostino, DO        alteplase (CATHFLO) injection 2 mg  2 mg Intracatheter Q1MIN PRN Harika Agostino, DO        dextrose 5 % infusion  20 mL/hr Intravenous Once Harika Agostino, DO        sodium chloride 0.9 % infusion  20 mL/hr Intravenous Once PRN Harika Agostino, DO   Stopped at 01/23/24 1340     [unfilled]    SOCIAL HISTORY:   reports that she has never smoked. She has never been exposed to tobacco smoke. She has never used smokeless tobacco. She reports that she does not drink alcohol and does not use drugs.     FAMILY HISTORY:  family history includes Colon cancer (age of onset: 58) in her mother; Hypertension in her daughter, mother, and son; Pancreatic cancer (age of onset: 60) in her father.     ALLERGIES:  has No Known Allergies.      Physical Exam:  Vital Signs:   Visit Vitals  /88 (BP Location: Left arm, Patient Position: Sitting, Cuff Size: Adult)   Pulse (!) 113   Temp (!) 97.1 °F (36.2 °C)   Resp 17   Ht 5' 5.16\" (1.655 m)   Wt 101 kg (221 lb 12.8 oz)   SpO2 99%   BMI 36.73 kg/m²   OB Status Postmenopausal   Smoking Status Never   BSA 2.07 m²     Body mass index is 36.73 kg/m².  Body surface area is 2.07 meters squared.    GEN: Alert, awake oriented x3, in no acute distress  HEENT- No pallor, icterus, cyanosis, no oral mucosal lesions,   LAD - no palpable cervical, clavicle, axillary, inguinal LAD  Heart- normal S1 S2, regular rate and rhythm, No murmur, rubs.   Lungs- clear breathing sound bilateral.   Abdomen- soft, Non tender, bowel sounds present  Extremities- No cyanosis, clubbing, edema  Neuro- No focal neurological deficit    Labs:  Lab Results   Component Value Date    WBC 7.04 01/22/2024    HGB 9.2 (L) 01/22/2024    " HCT 29.4 (L) 01/22/2024    MCV 93 01/22/2024     (L) 01/22/2024     Lab Results   Component Value Date    SODIUM 139 01/22/2024    K 3.2 (L) 01/22/2024     01/22/2024    CO2 24 01/22/2024    AGAP 8 01/22/2024    BUN 6 01/22/2024    CREATININE 0.86 01/22/2024    GLUC 113 01/22/2024    GLUF 95 01/06/2024    CALCIUM 9.2 01/22/2024    AST 85 (H) 01/22/2024    ALT 44 01/22/2024    ALKPHOS 123 (H) 01/22/2024    TP 7.7 01/22/2024    TBILI 0.53 01/22/2024    EGFR 70 01/22/2024

## 2024-01-25 NOTE — PLAN OF CARE
Problem: INFECTION - ADULT  Goal: Absence or prevention of progression during hospitalization  Description: INTERVENTIONS:  - Assess and monitor for signs and symptoms of infection  - Monitor lab/diagnostic results  - Monitor all insertion sites, i.e. indwelling lines, tubes, and drains  - Monitor endotracheal if appropriate and nasal secretions for changes in amount and color  - [place] appropriate cooling/warming therapies per order  - Administer medications as ordered  - Instruct and encourage patient and family to use good hand hygiene technique  - Identify and instruct in appropriate isolation precautions for identified infection/condition  1/25/2024 1217 by Enedina Brown RN  Outcome: Progressing  1/25/2024 1217 by Enedina Brown RN  Outcome: Progressing  Goal: Absence of fever/infection during neutropenic period  Description: INTERVENTIONS:  - Monitor WBC    1/25/2024 1217 by Enedina Brown RN  Outcome: Progressing  1/25/2024 1217 by Enedina Brown RN  Outcome: Progressing     Problem: Knowledge Deficit  Goal: Patient/family/caregiver demonstrates understanding of disease process, treatment plan, medications, and discharge instructions  Description: Complete learning assessment and assess knowledge base.  Interventions:  - Provide teaching at level of understanding  - Provide teaching via preferred learning methods  1/25/2024 1217 by Enedina Brown RN  Outcome: Progressing  1/25/2024 1217 by Enedina Brown RN  Outcome: Progressing

## 2024-01-29 PROBLEM — Z12.11 SCREEN FOR COLON CANCER: Status: RESOLVED | Noted: 2018-08-09 | Resolved: 2024-01-29

## 2024-01-29 PROBLEM — J30.0 VASOMOTOR RHINITIS: Status: RESOLVED | Noted: 2018-08-09 | Resolved: 2024-01-29

## 2024-01-31 PROBLEM — R59.0 CERVICAL LYMPHADENOPATHY: Status: RESOLVED | Noted: 2023-03-21 | Resolved: 2024-01-31

## 2024-02-01 ENCOUNTER — OFFICE VISIT (OUTPATIENT)
Dept: SURGICAL ONCOLOGY | Facility: CLINIC | Age: 69
End: 2024-02-01
Payer: MEDICARE

## 2024-02-01 VITALS
HEIGHT: 65 IN | TEMPERATURE: 97.7 F | DIASTOLIC BLOOD PRESSURE: 77 MMHG | OXYGEN SATURATION: 99 % | SYSTOLIC BLOOD PRESSURE: 134 MMHG | HEART RATE: 117 BPM | BODY MASS INDEX: 36.15 KG/M2 | WEIGHT: 217 LBS | RESPIRATION RATE: 13 BRPM

## 2024-02-01 DIAGNOSIS — C78.7 METASTATIC COLON CANCER TO LIVER (HCC): Primary | ICD-10-CM

## 2024-02-01 DIAGNOSIS — Z79.899 OTHER LONG TERM (CURRENT) DRUG THERAPY: ICD-10-CM

## 2024-02-01 DIAGNOSIS — C09.9 RIGHT TONSILLAR SQUAMOUS CELL CARCINOMA (HCC): ICD-10-CM

## 2024-02-01 DIAGNOSIS — C18.9 METASTATIC COLON CANCER TO LIVER (HCC): Primary | ICD-10-CM

## 2024-02-01 PROCEDURE — 99215 OFFICE O/P EST HI 40 MIN: CPT | Performed by: STUDENT IN AN ORGANIZED HEALTH CARE EDUCATION/TRAINING PROGRAM

## 2024-02-01 RX ORDER — CEFAZOLIN SODIUM 2 G/50ML
2000 SOLUTION INTRAVENOUS ONCE
OUTPATIENT
Start: 2024-02-01 | End: 2024-02-01

## 2024-02-01 RX ORDER — METRONIDAZOLE 500 MG/100ML
500 INJECTION, SOLUTION INTRAVENOUS ONCE
OUTPATIENT
Start: 2024-02-01 | End: 2024-02-01

## 2024-02-01 NOTE — PROGRESS NOTES
Surgical Oncology Consultation F/U    [facility] [place]  [address] [facility] [address] [phone]    Patient:  Maira Moura  1955  [id number]    Primary Care provider:  Fanta Turner MD  [address]  [facility] [address]    Referring provider:  No referring provider defined for this encounter.    Diagnoses and all orders for this visit:    Metastatic colon cancer to liver (HCC)    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HCC)        Chief Complaint   Patient presents with    Follow-up       No follow-ups on file.    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, unresectable    7/31/2023 - FOLFOX    11/20/2023 - opdivo added to FOLFOX    12/18/2023-cycle 11-20% dose reduction of 5-FU bolus and oxaliplatin due to increased fatigue    1/2023 - oxaliplatin removed from treatment plan due to neuropathy - PET scan shows good disease control in all areas except colon lesion     Malignant neoplasm of right female breast (HCC)   11/23/2018 Initial Diagnosis    Malignant neoplasm of right female breast (HCC)     11/23/2018 Biopsy    Rt Breast US BX 1100 8cmfn, 4 passes 12g Marquee:  - Invasive breast carcinoma of no special type (ductal NST/invasive ductal carcinoma).  - grade 2  - %  - UT 95%  - HER2 negative     12/20/2018 Surgery    Right partial mastectomy and SLN biopsy:  Infiltrating ductal carcinoma, Grade 2/3, felt to be between 2.0 cm and 2.5 cm in greatest dimension  - No invasive tumor is seen at inked margins, but there is a focus of DCIS seen within approximately 1mm of the inked medial margin  - no LVI  - no LCIS  - 0/2 LN's  at least Stage IIA - pT2, pN0, cM0, G2.    -  "Dr Corondao     12/20/2018 -  Cancer Staged    Stage IIA - pT2, pN0, cM0, G2.       1/4/2019 Genomic Testing    Oncotype DX score: 13     2/1/2019 -  Hormone Therapy    anastrozole 1 mg daily as adjuvant endocrine therapy    - Dr Daley       2/14/2019 - 4/3/2019 Radiation    Course: C1    Plan ID Energy Fractions Dose per Fraction (cGy) Dose Correction (cGy) Total Dose Delivered (cGy) Elapsed Days   R Breast 10X/6X 25 / 25 200 0 5,000 40   R BRST BOOST 16E 5 / 5 200 0 1,000 7      Treatment dates:  C1: 2/14/2019 - 4/3/2019       Metastatic colon cancer to liver (HCC)   7/6/2023 Genetic Testing    CARIS Results:   KRAS Pathogenic Variant Exon 2  p.G12D : lack of benefit of cetuximab, panitumumab Level 2   No other actionable mutation; MSI stable; TMB low   MiFOLOXAi Results: \"Decreased Benefit of FOLFOX + Bevacizumab in first line metastatic CRC.  This patient may achieve improved results by receiving an alternative to FOLFOX, such as FOLFIRI, as their initial regimen. As an adjustment to frontline FOLFOXIRI following toxicity: This patient may achieve improved results by removing the oxaliplatin portion of their regimen\".         7/11/2023 Initial Diagnosis    Metastatic colon cancer to liver (HCC)     7/13/2023 -  Cancer Staged    Staging form: Colon and Rectum, AJCC 8th Edition  - Clinical stage from 7/13/2023: cT3, cN0, pM1 - Signed by Harika Medina DO on 9/28/2023  Total positive nodes: 0       7/31/2023 -  Chemotherapy    potassium chloride, 20 mEq, Intravenous, Once, 2 of 2 cycles  Administration: 20 mEq (11/6/2023), 20 mEq (11/20/2023)  alteplase (CATHFLO), 2 mg, Intracatheter, Every 1 Minute as needed, 13 of 15 cycles  Administration: 2 mg (1/3/2024)  pegfilgrastim (NEULASTA), 6 mg, Subcutaneous, Once, 7 of 9 cycles  Administration: 6 mg (10/25/2023), 6 mg (11/8/2023), 6 mg (11/22/2023), 6 mg (12/6/2023), 6 mg (12/20/2023), 6 mg (1/12/2024), 6 mg (1/25/2024)  fluorouracil (ADRUCIL), 895 mg, Intravenous, " Once, 13 of 15 cycles  Dose modification: 320 mg/m2 (original dose 400 mg/m2, Cycle 11)  Administration: 900 mg (7/31/2023), 900 mg (8/14/2023), 900 mg (8/28/2023), 900 mg (9/11/2023), 900 mg (9/25/2023), 900 mg (10/9/2023), 900 mg (10/23/2023), 895 mg (11/6/2023), 895 mg (11/20/2023), 895 mg (12/4/2023), 700 mg (12/18/2023), 680 mg (1/10/2024), 680 mg (1/23/2024)  nivolumab (OPDIVO) IVPB, 240 mg (100 % of original dose 240 mg), Intravenous, Once, 5 of 7 cycles  Dose modification: 240 mg (original dose 240 mg, Cycle 9)  Administration: 240 mg (11/20/2023), 240 mg (12/4/2023), 240 mg (12/18/2023), 240 mg (1/10/2024), 240 mg (1/23/2024)  leucovorin calcium IVPB, 896 mg, Intravenous, Once, 13 of 15 cycles  Administration: 900 mg (7/31/2023), 900 mg (8/14/2023), 900 mg (8/28/2023), 900 mg (9/11/2023), 900 mg (9/25/2023), 900 mg (10/9/2023), 900 mg (10/23/2023), 900 mg (11/6/2023), 900 mg (11/20/2023), 900 mg (12/4/2023), 900 mg (12/18/2023), 850 mg (1/10/2024), 850 mg (1/23/2024)  oxaliplatin (ELOXATIN) chemo infusion, 85 mg/m2 = 190.4 mg, Intravenous, Once, 12 of 12 cycles  Dose modification: 68 mg/m2 (original dose 85 mg/m2, Cycle 11, Reason: Other (Must fill in a comment), Comment: increased fatigue)  Administration: 190.4 mg (7/31/2023), 190.4 mg (8/14/2023), 200 mg (8/28/2023), 200 mg (9/11/2023), 200 mg (9/25/2023), 200 mg (10/9/2023), 200 mg (10/23/2023), 200 mg (11/6/2023), 200 mg (11/20/2023), 190.4 mg (12/4/2023), 150 mg (12/18/2023), 150 mg (1/10/2024)  fluorouracil (ADRUCIL) ambulatory infusion Soln, 1,200 mg/m2/day = 5,375 mg, Intravenous, Over 46 hours, 13 of 15 cycles     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HCC)   7/27/2023 Initial Diagnosis    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HCC)     7/27/2023 -  Cancer Staged    Staging form: Pharynx - Oropharynx, AJCC 8th Edition  - Clinical stage from 7/27/2023: Stage III (cT1, cN3, cM0, p16+) - Signed by Evan Plummer MD on 7/27/2023  Stage  prefix: Initial diagnosis           History of Present Illness  :   This is a 68-year-old female seen today with a new right neck nodule.  Briefly, the patient presented to her primary care physician due to what she describes as throat soreness.  A large right neck nodule was identified, prompting a CT scan of the neck.  This demonstrated a near 5 cm right level 2 lymph node with multicystic components.  It also detected a right heterogeneous thyroid nodule of about 2 cm which did not appear to be locally invasive. The patient denies any significant symptoms of throat pain, trouble breathing, trouble swallowing, pain with swallowing, voice changes.  She does describe a sore throat for months if not years.  She states that the right sided neck nodule became enlarged over the last couple of months but it has not really bothered her.  She does have a history of right breast cancer treated with breast conservation in 2018.  Her mammograms since that time have been benign.  She denies any other lumps or bumps of the left neck, axilla, inguinal regions.  She denies any fevers, chills, night sweats, weight loss, other systemic symptoms.    At this juncture, given that she does not have any symptoms concerning for lymphoma and her enlarged lymph nodes are limited to the right neck where she also has a right thyroid nodule, I am concerned about a lymph node involved thyroid cancer.  I would like her to undergo an FNA of the right neck node to determine a baseline histology.  Likewise, she will need to undergo the thyroid ultrasound she is scheduled for as well as a potential right thyroid biopsy depending on these results.    Interval 6/7/23  Patient presents today for interval follow-up after the above investigations.  Thyroid ultrasound revealed a spongiform nodule with questionable criteria for biopsy.  Percutaneous FNA of the enlarged right cervical node revealed carcinoma, however, they were not able to determine tissue  of etiology given the scant tissue.  The patient's symptoms are largely unchanged.    8/2023  Two cycles FOLFOX completed for metastatic colon ca to liver. Also diagnosed with SCC at tonsil with mets to nodes. Plan for eventual XRT it appears. Doing well - swallowing well. Poor appetite but working on it. No abd pain, n/v, trouble with BM    1/2024  Pt seen in f/u. She is status post 12 total cycles of FOLFOX with nivolumab added in November.  PET scan prior to this visit shows good control of all areas of disease with the exception of increase in the primary colon mass. Her SCC is now un-detectable by PET - she has not received XRT for this. 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 Last 5FU nivo one week ago. Does suffer with constipation. No blood per rectum.     Review of Systems  Complete ROS Surg Onc:   Constitutional: The patient denies new or recent history of general fatigue, no recent weight loss, no change in appetite.   Eyes: No complaints of visual problems, no scleral icterus.   ENT: No complaints of ear pain, no hoarseness, no difficulty swallowing,  no tinnitus and no new masses in head, oral cavity, or neck.   Cardiovascular: No complaints of chest pain, no palpitations, no ankle edema.   Respiratory: No complaints of shortness of breath, no cough.   Gastrointestinal: No complaints of jaundice, no bloody stools, no pale stools.   Genitourinary: No complaints of dysuria, no hematuria, no nocturia, no frequent urination, no urethral discharge.   Musculoskeletal: No complaints of weakness, paralysis, joint stiffness or arthralgias.  Integumentary: No complaints of rash, no new lesions.   Neurological: No complaints of convulsions, no seizures, no dizziness.   Hematologic/Lymphatic: No complaints of easy bruising.   Endocrine:  No hot or cold intolerance.  No polydipsia, polyphagia, or polyuria.  Allergy/immunology:  No environmental allergies.  No food allergies.   Not immunocompromised.      Patient Active Problem List   Diagnosis    Primary hypertension    Mixed hyperlipidemia    Malignant neoplasm of right female breast (HCC)    Vitamin D deficiency    Prediabetes    Right thyroid nodule    GERD (gastroesophageal reflux disease)    Obesity    Metastatic colon cancer to liver (HCC)    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HCC)    Poor appetite    Anemia    Dehydration    Drug-induced constipation    Chemotherapy induced neutropenia     Stage 3 chronic kidney disease, unspecified whether stage 3a or 3b CKD (HCC)    Platelets decreased (HCC)    Neuropathy    Diastolic dysfunction     Past Medical History:   Diagnosis Date    Anemia     Arthritis     Body mass index (BMI) 40.0-44.9, adult (HCC)     Breast cancer (HCC) 12/17/2018    Cancer (HCC)     right breast, colon, liver, right tonsil    Cervical lymphadenopathy     CT neck on 3/30/2023- Large right level 2A lymphadenopathy as described above and suspicious for neoplasm.  Correlation with histopathology is recommended.  Mild asymmetric prominence of the right palatine tonsil with otherwise no definitive nodular enhancing lesions identified along the course of the aerodigestive tract.     5/26/23- FNA of this node was nonrevealing for tissue etiology, but it d    Encounter for screening for HIV 07/07/2022    Follow-up examination 04/04/2023    GERD (gastroesophageal reflux disease)     Hyperlipidemia     Hypertension     Obesity     Stroke (HCC)     TIA     Stroke (HCC)     TIA     Transaminitis 09/22/2021    Vasomotor rhinitis     Refilled flonase today. Stopped taking since January 2022     Past Surgical History:   Procedure Laterality Date    BREAST BIOPSY Right 11/23/2018    us guided bx cancer    BREAST SURGERY      ESOPHAGOSCOPY N/A 7/20/2023    Procedure: ESOPHAGOSCOPY;  Surgeon: David Chapa MD;  Location: [facility];  Service: ENT    IR BIOPSY LIVER MASS  6/27/2023    IR PORT CHECK  1/3/2024    IR PORT  PLACEMENT  7/28/2023    IR PORT STRIPPING  1/9/2024    VA BRNCC INCL FLUOR GDNCE DX W/CELL WASHG SPX N/A 7/20/2023    Procedure: BRONCHOSCOPY;  Surgeon: David Chapa MD;  Location: [facility];  Service: ENT    VA LARYNGOSCOPY W/BIOPSY MICROSCOPE/TELESCOPE N/A 7/20/2023    Procedure: MICRODIRECT LARYNGOSCOPY WITH BIOPSY;  Surgeon: David Chapa MD;  Location: [facility];  Service: ENT    VA MASTECTOMY PARTIAL W/AXILLARY LYMPHADENECTOMY Right 12/20/2018    Procedure: ULTRASOUND LOCALIZED PARTIAL MASTECTOMY W/SENTINEL NODE BIOPSY POSS AXILLARY DISSECTION;  Surgeon: Zahida Coronado MD;  Location: [facility];  Service: General    TUBAL LIGATION      US GUIDED BREAST BIOPSY RIGHT COMPLETE Right 11/23/2018    US GUIDED INJECTION FOR RESEARCH STUDY  12/20/2018    US GUIDED INJECTION FOR RESEARCH STUDY  12/7/2018    US GUIDED INJECTION FOR RESEARCH STUDY  5/3/2019    US GUIDED LYMPH NODE BIOPSY RIGHT  5/26/2023     Family History   Problem Relation Age of Onset    Colon cancer Mother 58    Hypertension Mother     Pancreatic cancer Father 60    Hypertension Daughter     Hypertension Son      Social History     Socioeconomic History    Marital status: Single     Spouse name: Not on file    Number of children: Not on file    Years of education: Not on file    Highest education level: Not on file   Occupational History    Not on file   Tobacco Use    Smoking status: Never     Passive exposure: Never    Smokeless tobacco: Never    Tobacco comments:     NO TOBACCO USE   Vaping Use    Vaping status: Never Used   Substance and Sexual Activity    Alcohol use: No    Drug use: No    Sexual activity: Not on file   Other Topics Concern    Not on file   Social History Narrative    Not on file     Social Determinants of Health     Financial Resource Strain: Low Risk  (10/9/2023)    Overall Financial Resource Strain (CARDIA)     Difficulty of Paying Living Expenses: Not hard at all   Food Insecurity: No Food Insecurity  (10/9/2023)    Hunger Vital Sign     Worried About Running Out of Food in the Last Year: Never true     Ran Out of Food in the Last Year: Never true   Transportation Needs: No Transportation Needs (10/9/2023)    PRAPARE - Transportation     Lack of Transportation (Medical): No     Lack of Transportation (Non-Medical): No   Physical Activity: Not on file   Stress: Not on file   Social Connections: Not on file   Intimate Partner Violence: Not on file   Housing Stability: Not on file       Current Outpatient Medications:     anastrozole (ARIMIDEX) 1 mg tablet, Take 1 tablet (1 mg total) by mouth daily, Disp: 30 tablet, Rfl: 0    atorvastatin (LIPITOR) 40 mg tablet, Take 1 tablet (40 mg total) by mouth daily at bedtime, Disp: 30 tablet, Rfl: 2    Cyanocobalamin (B-12 PO), Take by mouth, Disp: , Rfl:     docusate sodium (COLACE) 50 mg capsule, Take 1 capsule (50 mg total) by mouth 2 (two) times a day, Disp: 90 capsule, Rfl: 1    ergocalciferol (VITAMIN D2) 50,000 units, Take 1 capsule (50,000 Units total) by mouth once a week, Disp: 90 capsule, Rfl: 3    folic acid (FOLVITE) 1 mg tablet, Take 1 tablet (1 mg total) by mouth in the morning, Disp: 90 tablet, Rfl: 3    lidocaine-prilocaine (EMLA) cream, Apply topically as needed for mild pain, Disp: 30 g, Rfl: 3    losartan (COZAAR) 50 mg tablet, Take 1 tablet (50 mg total) by mouth daily, Disp: 90 tablet, Rfl: 1    Magnesium Oxide 400 MG CAPS, Take 1 tablet (400 mg total) by mouth 2 (two) times a day, Disp: 14 capsule, Rfl: 0    mirtazapine (REMERON) 30 mg tablet, Take 1 tablet (30 mg total) by mouth daily at bedtime, Disp: 30 tablet, Rfl: 3    omeprazole (PriLOSEC) 20 mg delayed release capsule, Take 1 capsule (20 mg total) by mouth daily, Disp: 30 capsule, Rfl: 5    ondansetron (ZOFRAN) 8 mg tablet, Take 1 tablet (8 mg total) by mouth every 8 (eight) hours as needed for nausea or vomiting, Disp: 30 tablet, Rfl: 3    potassium chloride (K-DUR,KLOR-CON) 20 mEq tablet, Take  1 tablet (20 mEq total) by mouth 2 (two) times a day, Disp: 60 tablet, Rfl: 1    prochlorperazine (COMPAZINE) 10 mg tablet, Take 1 tablet (10 mg total) by mouth every 6 (six) hours as needed for nausea or vomiting, Disp: 30 tablet, Rfl: 3    pyridoxine (VITAMIN B6) 50 mg tablet, Take 1 tablet (50 mg total) by mouth daily, Disp: 90 tablet, Rfl: 0    VITAMIN D PO, Take 50,000 Units by mouth once a week, Disp: , Rfl:   No Known Allergies    Vitals:    02/01/24 1132   BP: 134/77   Pulse: (!) 117   Resp: 13   Temp: 97.7 °F (36.5 °C)   SpO2: 99%       Physical Exam   General: Appears well, appears stated age  Skin: Warm, anicteric  HEENT: Normocephalic, atraumatic; sclera aniceteric, mucous membranes moist; cervical nodes without adenopathy. RIGHT neck firm nodule at inf margin of mandible appreciated  Cardiopulmonary: RRR, Easy WOB, no BLE edema  Abd: Flat and soft, nontender, no masses appreciated, no hepatosplenomegaly  MSK: Symmetric, no cyanosis, no overt weakness  Lymphatic: No cervical, axillary or inguinal lymphadenopathy  Neuro: Affect appropriate, no gross motor abnormalities      Pathology:  Pending    Labs: Reviewed in EPIC    Imaging  CT soft tissue neck w contrast    Result Date: 4/4/2023  Narrative: CT NECK WITH CONTRAST INDICATION:   I88.9: Nonspecific lymphadenitis, unspecified.  Right-sided neck swelling for one month. COMPARISON:  None. TECHNIQUE:  Axial, sagittal, and coronal 2D reformatted images were created from the axial source data and submitted for interpretation. Radiation dose length product (DLP) for this visit:  352 mGy-cm .  This examination, like all CT scans performed in the [facility], was performed utilizing techniques to minimize radiation dose exposure, including the use of iterative reconstruction and automated exposure control. IV Contrast:  85 mL of iohexol (OMNIPAQUE) IMAGE QUALITY:  Diagnostic. FINDINGS: VISUALIZED BRAIN PARENCHYMA:  No acute intracranial  pathology of the visualized brain parenchyma. VISUALIZED ORBITS: Normal visualized orbits. PARANASAL SINUSES: There is chronic opacification of the right sphenoid sinus with surrounding chronic osteitis.  The medial bony wall along the petrous portion of the right internal carotid artery also appears dehiscent with the adjacent sphenoid sinus.   NASAL CAVITY AND NASOPHARYNX:  Normal. SUPRAHYOID NECK:  There is mild asymmetric prominence of the right palatine tonsil with otherwise no discrete underlying nodular enhancing lesion.  Lingual tonsils appear grossly unremarkable. INFRAHYOID NECK:  Aryepiglottic folds and piriform sinuses are normal.  Normal glottis and subglottic airway. THYROID GLAND:  There is a right thyroid lobe nodule measuring 1.5 x 1.7 cm on series 3, image 47. PAROTID AND SUBMANDIBULAR GLANDS:  Normal. LYMPH NODES:  There is a large right level 2A lymph node with internal cystic change measuring 2.2 x 3.7 x 4.7 cm.  This results in mass effect and anterior displacement of the right submandibular gland. VASCULAR STRUCTURES:  Normal enhancement of the cervical vasculature. THORACIC INLET:  Lung apices and upper mediastinum are unremarkable. BONY STRUCTURES: No acute fracture or destructive osseous lesion.     Impression: Large right level 2A lymphadenopathy as described above and suspicious for neoplasm.  Correlation with histopathology is recommended. Mild asymmetric prominence of the right palatine tonsil with otherwise no definitive nodular enhancing lesions identified along the course of the aerodigestive tract. Heterogeneous right thyroid lobe nodule.  Ultrasound is recommended for further evaluation.  I personally discussed this study with ERICA ANNE on 4/4/2023 at 9:21 AM. Workstation performed: [id number]       I independently reviewed and interpreted the above laboratory and imaging data, including CT of the neck, breast history, pathology, thyroid US. ENT notes, op notes,  path, med onc notes, recent PET.       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 On systemic treatment and has completed 12 cycles folfox. Now on nivo and maintenance 5FU and received last dose one week ago. Recent PET with activity only in colon mass which is more avid than before. Has been submitted for TB. Today we discussed robo right colectomy and risks to include infection, bleeding, anastomotic leak.  The patient has not had surgery within her abdomen before and thus her risks for completion robotically are low, however, she is obese and this does increase her risks.  I like to discuss with Dr. Medina what it would look like to come off therapy.  Likewise, she will be discussed at our tumor board.  Finally, I like to discuss management of her squamous cell cancer and whether the Nivo has been effective for this and if holding it will be risky for recurrence.  Will plan on robotic right colectomy in roughly 5 weeks which would be 6 weeks from her most recent treatment.  She will also need cardiac clearance given her history.  She will follow-up in 3 weeks to further discuss after the above evaluations.

## 2024-02-01 NOTE — LETTER
February 1, 2024     Harika Medina DO  [address]  [address]  [facility] [address]    Patient: Maira Moura   YOB: 1955   Date of Visit: 2/1/2024       Dear Dr. Medina:    Thank you for referring Maira Moura to me for evaluation. Below are my notes for this consultation.    If you have questions, please do not hesitate to call me. I look forward to following your patient along with you.         Sincerely,        Vickie Israel MD        CC: JOANN Martin MD  2/1/2024  4:30 PM  Sign when Signing Visit  Surgical Oncology Consultation F/U    [facility] [place]  [address] [address]  [phone]    Patient:  Maira Moura  1955  [id number]    Primary Care provider:  Fanta Turner MD  [address]  [facility] [address]    Referring provider:  No referring provider defined for this encounter.    Diagnoses and all orders for this visit:    Metastatic colon cancer to liver (HCC)    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HCC)        Chief Complaint   Patient presents with   • Follow-up       No follow-ups on file.    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, unresectable    7/31/2023 - FOLFOX    11/20/2023 - opdivo added to FOLFOX    12/18/2023-cycle 11-20% dose reduction of 5-FU bolus and oxaliplatin due to increased fatigue    1/2023 - oxaliplatin removed from treatment plan due to neuropathy - PET scan shows good disease control in all areas except colon lesion     Malignant neoplasm of right female breast (HCC)   11/23/2018 Initial Diagnosis    Malignant neoplasm of right female breast (HCC)     11/23/2018 Biopsy    Rt  "Breast US BX 1100 8cmfn, 4 passes 12g Marquee:  - Invasive breast carcinoma of no special type (ductal NST/invasive ductal carcinoma).  - grade 2  - %  - NV 95%  - HER2 negative     12/20/2018 Surgery    Right partial mastectomy and SLN biopsy:  Infiltrating ductal carcinoma, Grade 2/3, felt to be between 2.0 cm and 2.5 cm in greatest dimension  - No invasive tumor is seen at inked margins, but there is a focus of DCIS seen within approximately 1mm of the inked medial margin  - no LVI  - no LCIS  - 0/2 LN's  at least Stage IIA - pT2, pN0, cM0, G2.    - Dr Coronado     12/20/2018 -  Cancer Staged    Stage IIA - pT2, pN0, cM0, G2.       1/4/2019 Genomic Testing    Oncotype DX score: 13     2/1/2019 -  Hormone Therapy    anastrozole 1 mg daily as adjuvant endocrine therapy    - Dr Daley       2/14/2019 - 4/3/2019 Radiation    Course: C1    Plan ID Energy Fractions Dose per Fraction (cGy) Dose Correction (cGy) Total Dose Delivered (cGy) Elapsed Days   R Breast 10X/6X 25 / 25 200 0 5,000 40   R BRST BOOST 16E 5 / 5 200 0 1,000 7      Treatment dates:  C1: 2/14/2019 - 4/3/2019       Metastatic colon cancer to liver (HCC)   7/6/2023 Genetic Testing    CARIS Results:   KRAS Pathogenic Variant Exon 2  p.G12D : lack of benefit of cetuximab, panitumumab Level 2   No other actionable mutation; MSI stable; TMB low   MiFOLOXAi Results: \"Decreased Benefit of FOLFOX + Bevacizumab in first line metastatic CRC.  This patient may achieve improved results by receiving an alternative to FOLFOX, such as FOLFIRI, as their initial regimen. As an adjustment to frontline FOLFOXIRI following toxicity: This patient may achieve improved results by removing the oxaliplatin portion of their regimen\".         7/11/2023 Initial Diagnosis    Metastatic colon cancer to liver (HCC)     7/13/2023 -  Cancer Staged    Staging form: Colon and Rectum, AJCC 8th Edition  - Clinical stage from 7/13/2023: cT3, cN0, pM1 - Signed by Harika Medina DO " on 9/28/2023  Total positive nodes: 0       7/31/2023 -  Chemotherapy    potassium chloride, 20 mEq, Intravenous, Once, 2 of 2 cycles  Administration: 20 mEq (11/6/2023), 20 mEq (11/20/2023)  alteplase (CATHFLO), 2 mg, Intracatheter, Every 1 Minute as needed, 13 of 15 cycles  Administration: 2 mg (1/3/2024)  pegfilgrastim (NEULASTA), 6 mg, Subcutaneous, Once, 7 of 9 cycles  Administration: 6 mg (10/25/2023), 6 mg (11/8/2023), 6 mg (11/22/2023), 6 mg (12/6/2023), 6 mg (12/20/2023), 6 mg (1/12/2024), 6 mg (1/25/2024)  fluorouracil (ADRUCIL), 895 mg, Intravenous, Once, 13 of 15 cycles  Dose modification: 320 mg/m2 (original dose 400 mg/m2, Cycle 11)  Administration: 900 mg (7/31/2023), 900 mg (8/14/2023), 900 mg (8/28/2023), 900 mg (9/11/2023), 900 mg (9/25/2023), 900 mg (10/9/2023), 900 mg (10/23/2023), 895 mg (11/6/2023), 895 mg (11/20/2023), 895 mg (12/4/2023), 700 mg (12/18/2023), 680 mg (1/10/2024), 680 mg (1/23/2024)  nivolumab (OPDIVO) IVPB, 240 mg (100 % of original dose 240 mg), Intravenous, Once, 5 of 7 cycles  Dose modification: 240 mg (original dose 240 mg, Cycle 9)  Administration: 240 mg (11/20/2023), 240 mg (12/4/2023), 240 mg (12/18/2023), 240 mg (1/10/2024), 240 mg (1/23/2024)  leucovorin calcium IVPB, 896 mg, Intravenous, Once, 13 of 15 cycles  Administration: 900 mg (7/31/2023), 900 mg (8/14/2023), 900 mg (8/28/2023), 900 mg (9/11/2023), 900 mg (9/25/2023), 900 mg (10/9/2023), 900 mg (10/23/2023), 900 mg (11/6/2023), 900 mg (11/20/2023), 900 mg (12/4/2023), 900 mg (12/18/2023), 850 mg (1/10/2024), 850 mg (1/23/2024)  oxaliplatin (ELOXATIN) chemo infusion, 85 mg/m2 = 190.4 mg, Intravenous, Once, 12 of 12 cycles  Dose modification: 68 mg/m2 (original dose 85 mg/m2, Cycle 11, Reason: Other (Must fill in a comment), Comment: increased fatigue)  Administration: 190.4 mg (7/31/2023), 190.4 mg (8/14/2023), 200 mg (8/28/2023), 200 mg (9/11/2023), 200 mg (9/25/2023), 200 mg (10/9/2023), 200 mg (10/23/2023),  200 mg (11/6/2023), 200 mg (11/20/2023), 190.4 mg (12/4/2023), 150 mg (12/18/2023), 150 mg (1/10/2024)  fluorouracil (ADRUCIL) ambulatory infusion Soln, 1,200 mg/m2/day = 5,375 mg, Intravenous, Over 46 hours, 13 of 15 cycles     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HCC)   7/27/2023 Initial Diagnosis    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HCC)     7/27/2023 -  Cancer Staged    Staging form: Pharynx - Oropharynx, AJCC 8th Edition  - Clinical stage from 7/27/2023: Stage III (cT1, cN3, cM0, p16+) - Signed by Evan Plummer MD on 7/27/2023  Stage prefix: Initial diagnosis           History of Present Illness  :   This is a 68-year-old female seen today with a new right neck nodule.  Briefly, the patient presented to her primary care physician due to what she describes as throat soreness.  A large right neck nodule was identified, prompting a CT scan of the neck.  This demonstrated a near 5 cm right level 2 lymph node with multicystic components.  It also detected a right heterogeneous thyroid nodule of about 2 cm which did not appear to be locally invasive. The patient denies any significant symptoms of throat pain, trouble breathing, trouble swallowing, pain with swallowing, voice changes.  She does describe a sore throat for months if not years.  She states that the right sided neck nodule became enlarged over the last couple of months but it has not really bothered her.  She does have a history of right breast cancer treated with breast conservation in 2018.  Her mammograms since that time have been benign.  She denies any other lumps or bumps of the left neck, axilla, inguinal regions.  She denies any fevers, chills, night sweats, weight loss, other systemic symptoms.    At this juncture, given that she does not have any symptoms concerning for lymphoma and her enlarged lymph nodes are limited to the right neck where she also has a right thyroid nodule, I am concerned about a lymph node involved  thyroid cancer.  I would like her to undergo an FNA of the right neck node to determine a baseline histology.  Likewise, she will need to undergo the thyroid ultrasound she is scheduled for as well as a potential right thyroid biopsy depending on these results.    Interval 6/7/23  Patient presents today for interval follow-up after the above investigations.  Thyroid ultrasound revealed a spongiform nodule with questionable criteria for biopsy.  Percutaneous FNA of the enlarged right cervical node revealed carcinoma, however, they were not able to determine tissue of etiology given the scant tissue.  The patient's symptoms are largely unchanged.    8/2023  Two cycles FOLFOX completed for metastatic colon ca to liver. Also diagnosed with SCC at tonsil with mets to nodes. Plan for eventual XRT it appears. Doing well - swallowing well. Poor appetite but working on it. No abd pain, n/v, trouble with BM    1/2024  Pt seen in f/u. She is status post 12 total cycles of FOLFOX with nivolumab added in November.  PET scan prior to this visit shows good control of all areas of disease with the exception of increase in the primary colon mass. Her SCC is now un-detectable by PET - she has not received XRT for this. 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 Last 5FU nivo one week ago. Does suffer with constipation. No blood per rectum.     Review of Systems  Complete ROS Surg Onc:   Constitutional: The patient denies new or recent history of general fatigue, no recent weight loss, no change in appetite.   Eyes: No complaints of visual problems, no scleral icterus.   ENT: No complaints of ear pain, no hoarseness, no difficulty swallowing,  no tinnitus and no new masses in head, oral cavity, or neck.   Cardiovascular: No complaints of chest pain, no palpitations, no ankle edema.   Respiratory: No complaints of shortness of breath, no cough.   Gastrointestinal: No complaints of jaundice, no  bloody stools, no pale stools.   Genitourinary: No complaints of dysuria, no hematuria, no nocturia, no frequent urination, no urethral discharge.   Musculoskeletal: No complaints of weakness, paralysis, joint stiffness or arthralgias.  Integumentary: No complaints of rash, no new lesions.   Neurological: No complaints of convulsions, no seizures, no dizziness.   Hematologic/Lymphatic: No complaints of easy bruising.   Endocrine:  No hot or cold intolerance.  No polydipsia, polyphagia, or polyuria.  Allergy/immunology:  No environmental allergies.  No food allergies.  Not immunocompromised.      Patient Active Problem List   Diagnosis   • Primary hypertension   • Mixed hyperlipidemia   • Malignant neoplasm of right female breast (HCC)   • Vitamin D deficiency   • Prediabetes   • Right thyroid nodule   • GERD (gastroesophageal reflux disease)   • Obesity   • Metastatic colon cancer to liver (HCC)   • 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HCC)   • Poor appetite   • Anemia   • Dehydration   • Drug-induced constipation   • Chemotherapy induced neutropenia    • Stage 3 chronic kidney disease, unspecified whether stage 3a or 3b CKD (HCC)   • Platelets decreased (HCC)   • Neuropathy   • Diastolic dysfunction     Past Medical History:   Diagnosis Date   • Anemia    • Arthritis    • Body mass index (BMI) 40.0-44.9, adult (HCC)    • Breast cancer (HCC) 12/17/2018   • Cancer (HCC)     right breast, colon, liver, right tonsil   • Cervical lymphadenopathy     CT neck on 3/30/2023- Large right level 2A lymphadenopathy as described above and suspicious for neoplasm.  Correlation with histopathology is recommended.  Mild asymmetric prominence of the right palatine tonsil with otherwise no definitive nodular enhancing lesions identified along the course of the aerodigestive tract.     5/26/23- FNA of this node was nonrevealing for tissue etiology, but it d   • Encounter for screening for HIV 07/07/2022   • Follow-up examination  04/04/2023   • GERD (gastroesophageal reflux disease)    • Hyperlipidemia    • Hypertension    • Obesity    • Stroke (HCC)     TIA    • Stroke (HCC)     TIA    • Transaminitis 09/22/2021   • Vasomotor rhinitis     Refilled flonase today. Stopped taking since January 2022     Past Surgical History:   Procedure Laterality Date   • BREAST BIOPSY Right 11/23/2018    us guided bx cancer   • BREAST SURGERY     • ESOPHAGOSCOPY N/A 7/20/2023    Procedure: ESOPHAGOSCOPY;  Surgeon: David Chapa MD;  [facility]: [facility];  Service: ENT   • IR BIOPSY LIVER MASS  6/27/2023   • IR PORT CHECK  1/3/2024   • IR PORT PLACEMENT  7/28/2023   • IR PORT STRIPPING  1/9/2024   • MD BRNCHSC INCL FLUOR GDNCE DX W/CELL WASHG SPX N/A 7/20/2023    Procedure: BRONCHOSCOPY;  Surgeon: David Chapa MD;  [facility];  Service: ENT   • MD LARYNGOSCOPY W/BIOPSY MICROSCOPE/TELESCOPE N/A 7/20/2023    Procedure: MICRODIRECT LARYNGOSCOPY WITH BIOPSY;  Surgeon: David Chapa MD;  [facility];  Service: ENT   • MD MASTECTOMY PARTIAL W/AXILLARY LYMPHADENECTOMY Right 12/20/2018    Procedure: ULTRASOUND LOCALIZED PARTIAL MASTECTOMY W/SENTINEL NODE BIOPSY POSS AXILLARY DISSECTION;  Surgeon: Zahida Coronado MD;  Location: [facility];  Service: General   • TUBAL LIGATION     • US GUIDED BREAST BIOPSY RIGHT COMPLETE Right 11/23/2018   • US GUIDED INJECTION FOR RESEARCH STUDY  12/20/2018   • US GUIDED INJECTION FOR RESEARCH STUDY  12/7/2018   • US GUIDED INJECTION FOR RESEARCH STUDY  5/3/2019   • US GUIDED LYMPH NODE BIOPSY RIGHT  5/26/2023     Family History   Problem Relation Age of Onset   • Colon cancer Mother 58   • Hypertension Mother    • Pancreatic cancer Father 60   • Hypertension Daughter    • Hypertension Son      Social History     Socioeconomic History   • Marital status: Single     Spouse name: Not on file   • Number of children: Not on file   • Years of education: Not on file   • Highest education level: Not on file    Occupational History   • Not on file   Tobacco Use   • Smoking status: Never     Passive exposure: Never   • Smokeless tobacco: Never   • Tobacco comments:     NO TOBACCO USE   Vaping Use   • Vaping status: Never Used   Substance and Sexual Activity   • Alcohol use: No   • Drug use: No   • Sexual activity: Not on file   Other Topics Concern   • Not on file   Social History Narrative   • Not on file     Social Determinants of Health     Financial Resource Strain: Low Risk  (10/9/2023)    Overall Financial Resource Strain (CARDIA)    • Difficulty of Paying Living Expenses: Not hard at all   Food Insecurity: No Food Insecurity (10/9/2023)    Hunger Vital Sign    • Worried About Running Out of Food in the Last Year: Never true    • Ran Out of Food in the Last Year: Never true   Transportation Needs: No Transportation Needs (10/9/2023)    PRAPARE - Transportation    • Lack of Transportation (Medical): No    • Lack of Transportation (Non-Medical): No   Physical Activity: Not on file   Stress: Not on file   Social Connections: Not on file   Intimate Partner Violence: Not on file   Housing Stability: Not on file       Current Outpatient Medications:   •  anastrozole (ARIMIDEX) 1 mg tablet, Take 1 tablet (1 mg total) by mouth daily, Disp: 30 tablet, Rfl: 0  •  atorvastatin (LIPITOR) 40 mg tablet, Take 1 tablet (40 mg total) by mouth daily at bedtime, Disp: 30 tablet, Rfl: 2  •  Cyanocobalamin (B-12 PO), Take by mouth, Disp: , Rfl:   •  docusate sodium (COLACE) 50 mg capsule, Take 1 capsule (50 mg total) by mouth 2 (two) times a day, Disp: 90 capsule, Rfl: 1  •  ergocalciferol (VITAMIN D2) 50,000 units, Take 1 capsule (50,000 Units total) by mouth once a week, Disp: 90 capsule, Rfl: 3  •  folic acid (FOLVITE) 1 mg tablet, Take 1 tablet (1 mg total) by mouth in the morning, Disp: 90 tablet, Rfl: 3  •  lidocaine-prilocaine (EMLA) cream, Apply topically as needed for mild pain, Disp: 30 g, Rfl: 3  •  losartan (COZAAR) 50 mg  tablet, Take 1 tablet (50 mg total) by mouth daily, Disp: 90 tablet, Rfl: 1  •  Magnesium Oxide 400 MG CAPS, Take 1 tablet (400 mg total) by mouth 2 (two) times a day, Disp: 14 capsule, Rfl: 0  •  mirtazapine (REMERON) 30 mg tablet, Take 1 tablet (30 mg total) by mouth daily at bedtime, Disp: 30 tablet, Rfl: 3  •  omeprazole (PriLOSEC) 20 mg delayed release capsule, Take 1 capsule (20 mg total) by mouth daily, Disp: 30 capsule, Rfl: 5  •  ondansetron (ZOFRAN) 8 mg tablet, Take 1 tablet (8 mg total) by mouth every 8 (eight) hours as needed for nausea or vomiting, Disp: 30 tablet, Rfl: 3  •  potassium chloride (K-DUR,KLOR-CON) 20 mEq tablet, Take 1 tablet (20 mEq total) by mouth 2 (two) times a day, Disp: 60 tablet, Rfl: 1  •  prochlorperazine (COMPAZINE) 10 mg tablet, Take 1 tablet (10 mg total) by mouth every 6 (six) hours as needed for nausea or vomiting, Disp: 30 tablet, Rfl: 3  •  pyridoxine (VITAMIN B6) 50 mg tablet, Take 1 tablet (50 mg total) by mouth daily, Disp: 90 tablet, Rfl: 0  •  VITAMIN D PO, Take 50,000 Units by mouth once a week, Disp: , Rfl:   No Known Allergies    Vitals:    02/01/24 1132   BP: 134/77   Pulse: (!) 117   Resp: 13   Temp: 97.7 °F (36.5 °C)   SpO2: 99%       Physical Exam   General: Appears well, appears stated age  Skin: Warm, anicteric  HEENT: Normocephalic, atraumatic; sclera aniceteric, mucous membranes moist; cervical nodes without adenopathy. RIGHT neck firm nodule at inf margin of mandible appreciated  Cardiopulmonary: RRR, Easy WOB, no BLE edema  Abd: Flat and soft, nontender, no masses appreciated, no hepatosplenomegaly  MSK: Symmetric, no cyanosis, no overt weakness  Lymphatic: No cervical, axillary or inguinal lymphadenopathy  Neuro: Affect appropriate, no gross motor abnormalities      Pathology:  Pending    Labs: Reviewed in EPIC    Imaging  CT soft tissue neck w contrast    Result Date: 4/4/2023  Narrative: CT NECK WITH CONTRAST INDICATION:   I88.9: Nonspecific  lymphadenitis, unspecified.  Right-sided neck swelling for one month. COMPARISON:  None. TECHNIQUE:  Axial, sagittal, and coronal 2D reformatted images were created from the axial source data and submitted for interpretation. Radiation dose length product (DLP) for this visit:  352 mGy-cm .  This examination, like all CT scans performed in the [facility], was performed utilizing techniques to minimize radiation dose exposure, including the use of iterative reconstruction and automated exposure control. IV Contrast:  85 mL of iohexol (OMNIPAQUE) IMAGE QUALITY:  Diagnostic. FINDINGS: VISUALIZED BRAIN PARENCHYMA:  No acute intracranial pathology of the visualized brain parenchyma. VISUALIZED ORBITS: Normal visualized orbits. PARANASAL SINUSES: There is chronic opacification of the right sphenoid sinus with surrounding chronic osteitis.  The medial bony wall along the petrous portion of the right internal carotid artery also appears dehiscent with the adjacent sphenoid sinus.   NASAL CAVITY AND NASOPHARYNX:  Normal. SUPRAHYOID NECK:  There is mild asymmetric prominence of the right palatine tonsil with otherwise no discrete underlying nodular enhancing lesion.  Lingual tonsils appear grossly unremarkable. INFRAHYOID NECK:  Aryepiglottic folds and piriform sinuses are normal.  Normal glottis and subglottic airway. THYROID GLAND:  There is a right thyroid lobe nodule measuring 1.5 x 1.7 cm on series 3, image 47. PAROTID AND SUBMANDIBULAR GLANDS:  Normal. LYMPH NODES:  There is a large right level 2A lymph node with internal cystic change measuring 2.2 x 3.7 x 4.7 cm.  This results in mass effect and anterior displacement of the right submandibular gland. VASCULAR STRUCTURES:  Normal enhancement of the cervical vasculature. THORACIC INLET:  Lung apices and upper mediastinum are unremarkable. BONY STRUCTURES: No acute fracture or destructive osseous lesion.     Impression: Large right level 2A  lymphadenopathy as described above and suspicious for neoplasm.  Correlation with histopathology is recommended. Mild asymmetric prominence of the right palatine tonsil with otherwise no definitive nodular enhancing lesions identified along the course of the aerodigestive tract. Heterogeneous right thyroid lobe nodule.  Ultrasound is recommended for further evaluation.  I personally discussed this study with ERICA ANNE on 4/4/2023 at 9:21 AM. Workstation performed: [id number]       I independently reviewed and interpreted the above laboratory and imaging data, including CT of the neck, breast history, pathology, thyroid US. ENT notes, op notes, path, med onc notes, recent PET.       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 On systemic treatment and has completed 12 cycles folfox. Now on nivo and maintenance 5FU and received last dose one week ago. Recent PET with activity only in colon mass which is more avid than before. Has been submitted for TB. Today we discussed robo right colectomy and risks to include infection, bleeding, anastomotic leak.  The patient has not had surgery within her abdomen before and thus her risks for completion robotically are low, however, she is obese and this does increase her risks.  I like to discuss with Dr. Medina what it would look like to come off therapy.  Likewise, she will be discussed at our tumor board.  Finally, I like to discuss management of her squamous cell cancer and whether the Nivo has been effective for this and if holding it will be risky for recurrence.  Will plan on robotic right colectomy in roughly 5 weeks which would be 6 weeks from her most recent treatment.  She will also need cardiac clearance given her history.  She will follow-up in 3 weeks to further discuss after the above evaluations.

## 2024-02-02 DIAGNOSIS — C78.7 METASTATIC COLON CANCER TO LIVER (HCC): Primary | ICD-10-CM

## 2024-02-02 DIAGNOSIS — C18.9 METASTATIC COLON CANCER TO LIVER (HCC): Primary | ICD-10-CM

## 2024-02-05 ENCOUNTER — HOSPITAL ENCOUNTER (OUTPATIENT)
Dept: INFUSION CENTER | Facility: CLINIC | Age: 69
Discharge: HOME | End: 2024-02-05

## 2024-02-05 ENCOUNTER — TELEPHONE (OUTPATIENT)
Dept: HEMATOLOGY ONCOLOGY | Facility: CLINIC | Age: 69
End: 2024-02-05

## 2024-02-05 NOTE — TELEPHONE ENCOUNTER
Spoke with patient. She states that she is going to be undergoing surgery with Dr. Israel and her chemo should be canceled. I told her that we will cancel the chemotherapy and I will leave her appt with Dr. Medina on 2/16 for now until Dr. Medina comes back from vacation and I will call her if her appointment needs to change. I also let her know that we saved a bunch of strawberry ensure drinks for her if she wants to come into the office. Patient denies needing any IVF at this time and is staying hydrated. I informed her at any time if she feels dehydrated, she can call and I will set up IVF for her. Patient verbalized understanding.

## 2024-02-05 NOTE — TELEPHONE ENCOUNTER
Spoke with pt. Explained to her that her chemo is on hold at this time as surgery is scheduled for 3/12. Pt wanted to know if she will still be getting hydration. Advised her to reach out to Dr Medina's team regarding this. She verbalized understanding.

## 2024-02-05 NOTE — TELEPHONE ENCOUNTER
Call Transfer   Who are you speaking with?  Patient   If it is not the patient, are they listed on an active communication consent form? N/A   Who is the patients HemOnc/SurgOnc provider? Dr. Israel   What is the reason for this call? She asked to speak to Dr. Israel's nurse if she needs blood work   Person/Department that the call was transferred to?    Time that call was transferred?    Dodie P   Your call will be transferred now. If you receive a voicemail, please leave a detailed message and a member of the team will return your call as soon as possible.    Did you relay this information to the caller?  Yes

## 2024-02-05 NOTE — TELEPHONE ENCOUNTER
Patient Call    Who are you speaking with? Patient    If it is not the patient, are they listed on an active communication consent form? Yes   What is the reason for this call? Was advised from Dr Israel to have the patient stop all chemo and wants advise on that    Does this require a call back? Yes   If a call back is required, please list best call back number [phone]   If a call back is required, advise that a message will be forwarded to their care team and someone will return their call as soon as possible.   Did you relay this information to the patient? Yes

## 2024-02-06 ENCOUNTER — TELEPHONE (OUTPATIENT)
Dept: NUTRITION | Facility: CLINIC | Age: 69
End: 2024-02-06

## 2024-02-06 ENCOUNTER — HOSPITAL ENCOUNTER (OUTPATIENT)
Dept: INFUSION CENTER | Facility: CLINIC | Age: 69
Discharge: HOME | End: 2024-02-06

## 2024-02-06 ENCOUNTER — DOCUMENTATION (OUTPATIENT)
Dept: HEMATOLOGY ONCOLOGY | Facility: CLINIC | Age: 69
End: 2024-02-06

## 2024-02-06 NOTE — PROGRESS NOTES
In-basket message received from Dr[facility]. Chart reviewed and prep completed.

## 2024-02-06 NOTE — TELEPHONE ENCOUNTER
Maira was scheduled for an RD follow up appt today, 2/6/24, during infusion. Per chart review, infusions have been cancelled as she will be going for surgery 3/12.     Reached to Maira to reschedule our appt. No answer. Left VM explaining reason for call and encouraging her to call back if she would like to reschedule our appointment.

## 2024-02-07 ENCOUNTER — TELEPHONE (OUTPATIENT)
Dept: HEMATOLOGY ONCOLOGY | Facility: CLINIC | Age: 69
End: 2024-02-07

## 2024-02-07 NOTE — TELEPHONE ENCOUNTER
Spoke with patient and informed her that we are r/s her follow-up appt from 2/16 to 3/22 because of her surgery. I informed her that if she needs anything in the meantime to please reach out to our office. Patient verbalized understanding.

## 2024-02-08 ENCOUNTER — PATIENT OUTREACH (OUTPATIENT)
Dept: HEMATOLOGY ONCOLOGY | Facility: CLINIC | Age: 69
End: 2024-02-08

## 2024-02-08 NOTE — PROGRESS NOTES
Called pt to check in, and confirm transportation needs. Pt does need transportation to her surgery 3/12, she has transportation home.  [facility] also need transportation to her medical oncology appointment 3/22/2024.  E mail sent to  [facility]  to confirm these dates

## 2024-02-14 ENCOUNTER — ANESTHESIA EVENT (OUTPATIENT)
Dept: PERIOP | Facility: HOSPITAL | Age: 69
DRG: 330 | End: 2024-02-14
Payer: MEDICARE

## 2024-02-14 NOTE — PRE-PROCEDURE INSTRUCTIONS
Pre-Surgery Instructions:   Medication Instructions    anastrozole (ARIMIDEX) 1 mg tablet Take day of surgery.    atorvastatin (LIPITOR) 40 mg tablet Take night before surgery    Cyanocobalamin (B-12 PO) Stop taking 7 days prior to surgery.    docusate sodium (COLACE) 50 mg capsule Hold day of surgery.    ergocalciferol (VITAMIN D2) 50,000 units Stop taking 7 days prior to surgery.    folic acid (FOLVITE) 1 mg tablet Hold day of surgery.    lidocaine-prilocaine (EMLA) cream Hold day of surgery.    losartan (COZAAR) 50 mg tablet Do not take this medication the day before and the morning of the day of surgery/procedure    mirtazapine (REMERON) 30 mg tablet Take night before surgery    omeprazole (PriLOSEC) 20 mg delayed release capsule Take day of surgery.    ondansetron (ZOFRAN) 8 mg tablet Uses PRN- OK to take day of surgery    potassium chloride (K-DUR,KLOR-CON) 20 mEq tablet Hold day of surgery.    prochlorperazine (COMPAZINE) 10 mg tablet Uses PRN- OK to take day of surgery    pyridoxine (VITAMIN B6) 50 mg tablet Stop taking 7 days prior to surgery.   Medication instructions for day surgery reviewed. Please use only a sip of water to take your instructed medications. Avoid all over the counter vitamins, supplements and NSAIDS for one week prior to surgery per anesthesia guidelines. Tylenol is ok to take as needed.     You will receive a call one business day prior to surgery with an arrival time and hospital directions. If your surgery is scheduled on a Monday, the hospital will be calling you on the Friday prior to your surgery. If you have not heard from anyone by 8pm, please call the hospital supervisor through the hospital  at [phone]. ([place] [phone] or [place] [phone]).    Do not eat or drink anything after midnight the night before your surgery, including candy, mints, lifesavers, or chewing gum. Do not drink alcohol 24hrs before your surgery. Try not to smoke at least 24hrs  before your surgery.       Follow the pre surgery showering instructions as listed in the “My Surgical Experience Booklet” or otherwise provided by your surgeon's office. Do not use a blade to shave the surgical area 1 week before surgery. It is okay to use a clean electric clippers up to 24 hours before surgery. Do not apply any lotions, creams, including makeup, cologne, deodorant, or perfumes after showering on the day of your surgery. Do not use dry shampoo, hair spray, hair gel, or any type of hair products.     No contact lenses, eye make-up, or artificial eyelashes. Remove nail polish, including gel polish, and any artificial, gel, or acrylic nails if possible. Remove all jewelry including rings and body piercing jewelry.     Wear causal clothing that is easy to take on and off. Consider your type of surgery.    Keep any valuables, jewelry, piercings at home. Please bring any specially ordered equipment (sling, braces) if indicated.    Arrange for a responsible person to drive you to and from the hospital on the day of your surgery. Visitor Guidelines discussed.     Call the surgeon's office with any new illnesses, exposures, or additional questions prior to surgery.    Please reference your “My Surgical Experience Booklet” for additional information to prepare for your upcoming surgery.

## 2024-02-20 ENCOUNTER — TELEPHONE (OUTPATIENT)
Dept: ANESTHESIOLOGY | Facility: CLINIC | Age: 69
End: 2024-02-20

## 2024-02-21 ENCOUNTER — APPOINTMENT (OUTPATIENT)
Dept: LAB | Facility: HOSPITAL | Age: 69
End: 2024-02-21
Payer: MEDICARE

## 2024-02-21 ENCOUNTER — HOSPITAL ENCOUNTER (OUTPATIENT)
Dept: RADIOLOGY | Facility: HOSPITAL | Age: 69
Discharge: HOME | End: 2024-02-21
Payer: MEDICARE

## 2024-02-21 ENCOUNTER — LAB REQUISITION (OUTPATIENT)
Dept: LAB | Facility: HOSPITAL | Age: 69
End: 2024-02-21
Payer: MEDICARE

## 2024-02-21 DIAGNOSIS — C78.7 METASTATIC COLON CANCER TO LIVER (HCC): ICD-10-CM

## 2024-02-21 DIAGNOSIS — C18.9 METASTATIC COLON CANCER TO LIVER (HCC): ICD-10-CM

## 2024-02-21 DIAGNOSIS — Z79.899 OTHER LONG TERM (CURRENT) DRUG THERAPY: ICD-10-CM

## 2024-02-21 DIAGNOSIS — C18.9 MALIGNANT NEOPLASM OF COLON, UNSPECIFIED (HCC): ICD-10-CM

## 2024-02-21 LAB
ABO GROUP BLD: NORMAL
ALBUMIN SERPL BCP-MCNC: 3.6 G/DL (ref 3.5–5)
ALP SERPL-CCNC: 100 U/L (ref 34–104)
ALT SERPL W P-5'-P-CCNC: 50 U/L (ref 7–52)
ANION GAP SERPL CALCULATED.3IONS-SCNC: 8 MMOL/L
APTT PPP: 31 SECONDS (ref 23–37)
AST SERPL W P-5'-P-CCNC: 78 U/L (ref 13–39)
BASOPHILS # BLD AUTO: 0.07 THOUSANDS/ÂΜL (ref 0–0.1)
BASOPHILS NFR BLD AUTO: 1 % (ref 0–1)
BILIRUB SERPL-MCNC: 0.37 MG/DL (ref 0.2–1)
BLD GP AB SCN SERPL QL: NEGATIVE
BUN SERPL-MCNC: 9 MG/DL (ref 5–25)
CALCIUM SERPL-MCNC: 9.8 MG/DL (ref 8.4–10.2)
CHLORIDE SERPL-SCNC: 102 MMOL/L (ref 96–108)
CO2 SERPL-SCNC: 26 MMOL/L (ref 21–32)
CREAT SERPL-MCNC: 0.89 MG/DL (ref 0.6–1.3)
EOSINOPHIL # BLD AUTO: 0.44 THOUSAND/ÂΜL (ref 0–0.61)
EOSINOPHIL NFR BLD AUTO: 7 % (ref 0–6)
ERYTHROCYTE [DISTWIDTH] IN BLOOD BY AUTOMATED COUNT: 17.7 % (ref 11.6–15.1)
EST. AVERAGE GLUCOSE BLD GHB EST-MCNC: 111 MG/DL
GFR SERPL CREATININE-BSD FRML MDRD: 66 ML/MIN/1.73SQ M
GLUCOSE SERPL-MCNC: 102 MG/DL (ref 65–140)
HBA1C MFR BLD: 5.5 %
HCT VFR BLD AUTO: 31.8 % (ref 34.8–46.1)
HGB BLD-MCNC: 10.4 G/DL (ref 11.5–15.4)
IMM GRANULOCYTES # BLD AUTO: 0.03 THOUSAND/UL (ref 0–0.2)
IMM GRANULOCYTES NFR BLD AUTO: 1 % (ref 0–2)
INR PPP: 0.9 (ref 0.84–1.19)
LYMPHOCYTES # BLD AUTO: 1.99 THOUSANDS/ÂΜL (ref 0.6–4.47)
LYMPHOCYTES NFR BLD AUTO: 30 % (ref 14–44)
MCH RBC QN AUTO: 30.5 PG (ref 26.8–34.3)
MCHC RBC AUTO-ENTMCNC: 32.7 G/DL (ref 31.4–37.4)
MCV RBC AUTO: 93 FL (ref 82–98)
MONOCYTES # BLD AUTO: 0.71 THOUSAND/ÂΜL (ref 0.17–1.22)
MONOCYTES NFR BLD AUTO: 11 % (ref 4–12)
NEUTROPHILS # BLD AUTO: 3.38 THOUSANDS/ÂΜL (ref 1.85–7.62)
NEUTS SEG NFR BLD AUTO: 50 % (ref 43–75)
NRBC BLD AUTO-RTO: 0 /100 WBCS
PLATELET # BLD AUTO: 247 THOUSANDS/UL (ref 149–390)
PMV BLD AUTO: 9.9 FL (ref 8.9–12.7)
POTASSIUM SERPL-SCNC: 4.1 MMOL/L (ref 3.5–5.3)
PROT SERPL-MCNC: 7.6 G/DL (ref 6.4–8.4)
PROTHROMBIN TIME: 12.5 SECONDS (ref 11.6–14.5)
RBC # BLD AUTO: 3.41 MILLION/UL (ref 3.81–5.12)
RH BLD: POSITIVE
SODIUM SERPL-SCNC: 136 MMOL/L (ref 135–147)
SPECIMEN EXPIRATION DATE: NORMAL
WBC # BLD AUTO: 6.62 THOUSAND/UL (ref 4.31–10.16)

## 2024-02-21 PROCEDURE — 36415 COLL VENOUS BLD VENIPUNCTURE: CPT

## 2024-02-21 PROCEDURE — 86900 BLOOD TYPING SEROLOGIC ABO: CPT | Performed by: STUDENT IN AN ORGANIZED HEALTH CARE EDUCATION/TRAINING PROGRAM

## 2024-02-21 PROCEDURE — 85610 PROTHROMBIN TIME: CPT

## 2024-02-21 PROCEDURE — 83036 HEMOGLOBIN GLYCOSYLATED A1C: CPT

## 2024-02-21 PROCEDURE — 86901 BLOOD TYPING SEROLOGIC RH(D): CPT | Performed by: STUDENT IN AN ORGANIZED HEALTH CARE EDUCATION/TRAINING PROGRAM

## 2024-02-21 PROCEDURE — 80053 COMPREHEN METABOLIC PANEL: CPT

## 2024-02-21 PROCEDURE — 86850 RBC ANTIBODY SCREEN: CPT | Performed by: STUDENT IN AN ORGANIZED HEALTH CARE EDUCATION/TRAINING PROGRAM

## 2024-02-21 PROCEDURE — 71046 X-RAY EXAM CHEST 2 VIEWS: CPT

## 2024-02-21 PROCEDURE — 85730 THROMBOPLASTIN TIME PARTIAL: CPT

## 2024-02-21 PROCEDURE — 85025 COMPLETE CBC W/AUTO DIFF WBC: CPT

## 2024-02-23 ENCOUNTER — DOCUMENTATION (OUTPATIENT)
Dept: HEMATOLOGY ONCOLOGY | Facility: CLINIC | Age: 69
End: 2024-02-23

## 2024-02-23 NOTE — PROGRESS NOTES
RECTAL/GI MULTIDISCIPLINARY CASE REVIEW    DATE: 2/22/2024      PRESENTING DOCTOR: Dr. 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      PHYSICIAN RECOMMENDED PLAN:    -Patient is scheduled for a right colectomy with robotics with Dr. Israel (surgical oncology) on 3/12/2024 for prevention of obstruction since patient presents symptoms.   -Possible treatment with immunotherapy after her surgery from medical oncology standpoint.   -Patient has a follow up appointment with Dr. Medina (medical oncology) on 3/22/2024.    Team agreed to plan.    The final treatment plan will be left to the discretion of the patient and the treating physician.     DISCLAIMERS:  TO THE TREATING PHYSICIAN:  This conference is a meeting of clinicians from various specialty areas who evaluate and discuss patients for whom a multidisciplinary treatment approach is being considered. Please note that the above opinion was a consensus of the conference attendees and is intended only to assist in quality care of the discussed patient.  The responsibility for follow up on the input given during the conference, along with any final decisions regarding plan of care, is that of the patient and the patient's provider. Accordingly, appointments have only been recommended based on this information and have NOT been scheduled unless otherwise noted.      TO THE PATIENT:  This summary is a brief record of major aspects of your cancer treatment. You may choose to share a copy with any of your doctors or nurses. However, this is not a detailed or comprehensive record of your care.      NCCN guidelines were readily available for review at this discussion

## 2024-03-04 ENCOUNTER — PATIENT OUTREACH (OUTPATIENT)
Dept: HEMATOLOGY ONCOLOGY | Facility: CLINIC | Age: 69
End: 2024-03-04

## 2024-03-04 NOTE — PROGRESS NOTES
Pt called stating she has an appt with Dr Israel tomorrow, and wants to make sure transportation is set up.  E mail sent to  [employer] for confirmation   E mail confirmation received from [employer], pt is scheduled for transportation tomorrow 3/5

## 2024-03-05 ENCOUNTER — TELEPHONE (OUTPATIENT)
Dept: ANESTHESIOLOGY | Facility: CLINIC | Age: 69
End: 2024-03-05

## 2024-03-05 ENCOUNTER — OFFICE VISIT (OUTPATIENT)
Dept: SURGICAL ONCOLOGY | Facility: CLINIC | Age: 69
End: 2024-03-05
Payer: MEDICARE

## 2024-03-05 VITALS
RESPIRATION RATE: 18 BRPM | HEART RATE: 116 BPM | DIASTOLIC BLOOD PRESSURE: 76 MMHG | BODY MASS INDEX: 34.99 KG/M2 | WEIGHT: 210 LBS | HEIGHT: 65 IN | TEMPERATURE: 97.9 F | OXYGEN SATURATION: 99 % | SYSTOLIC BLOOD PRESSURE: 132 MMHG

## 2024-03-05 DIAGNOSIS — C78.7 METASTATIC COLON CANCER TO LIVER (HCC): Primary | ICD-10-CM

## 2024-03-05 DIAGNOSIS — C18.9 METASTATIC COLON CANCER TO LIVER (HCC): Primary | ICD-10-CM

## 2024-03-05 PROCEDURE — 99213 OFFICE O/P EST LOW 20 MIN: CPT | Performed by: STUDENT IN AN ORGANIZED HEALTH CARE EDUCATION/TRAINING PROGRAM

## 2024-03-05 NOTE — H&P (VIEW-ONLY)
Surgical Oncology Consultation F/U    [facility] [place]  [address] [facility] [address] [phone]    Patient:  Maira Moura  1955  [id number]    Primary Care provider:  Fanta Turner MD  [address]  [facility] [address]    Referring provider:  No referring provider defined for this encounter.    Diagnoses and all orders for this visit:    Metastatic colon cancer to liver (HCC)        Chief Complaint   Patient presents with    Follow-up       No follow-ups on file.    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, unresectable    7/31/2023 - FOLFOX    11/20/2023 - opdivo added to FOLFOX    12/18/2023-cycle 11-20% dose reduction of 5-FU bolus and oxaliplatin due to increased fatigue    1/2023 - oxaliplatin removed from treatment plan due to neuropathy - PET scan shows good disease control in all areas except colon lesion     Malignant neoplasm of right female breast (HCC)   11/23/2018 Initial Diagnosis    Malignant neoplasm of right female breast (HCC)     11/23/2018 Biopsy    Rt Breast US BX 1100 8cmfn, 4 passes 12g Marquee:  - Invasive breast carcinoma of no special type (ductal NST/invasive ductal carcinoma).  - grade 2  - %  - SD 95%  - HER2 negative     12/20/2018 Surgery    Right partial mastectomy and SLN biopsy:  Infiltrating ductal carcinoma, Grade 2/3, felt to be between 2.0 cm and 2.5 cm in greatest dimension  - No invasive tumor is seen at inked margins, but there is a focus of DCIS seen within approximately 1mm of the inked medial margin  - no LVI  - no LCIS  - 0/2 LN's  at least Stage IIA - pT2, pN0, cM0, G2.    - Dr Coronado     12/20/2018 -  Cancer Staged     "Stage IIA - pT2, pN0, cM0, G2.       1/4/2019 Genomic Testing    Oncotype DX score: 13     2/1/2019 -  Hormone Therapy    anastrozole 1 mg daily as adjuvant endocrine therapy    - Dr Daley       2/14/2019 - 4/3/2019 Radiation    Course: C1    Plan ID Energy Fractions Dose per Fraction (cGy) Dose Correction (cGy) Total Dose Delivered (cGy) Elapsed Days   R Breast 10X/6X 25 / 25 200 0 5,000 40   R BRST BOOST 16E 5 / 5 200 0 1,000 7      Treatment dates:  C1: 2/14/2019 - 4/3/2019       Metastatic colon cancer to liver (HCC)   7/6/2023 Genetic Testing    CARIS Results:   KRAS Pathogenic Variant Exon 2  p.G12D : lack of benefit of cetuximab, panitumumab Level 2   No other actionable mutation; MSI stable; TMB low   MiFOLOXAi Results: \"Decreased Benefit of FOLFOX + Bevacizumab in first line metastatic CRC.  This patient may achieve improved results by receiving an alternative to FOLFOX, such as FOLFIRI, as their initial regimen. As an adjustment to frontline FOLFOXIRI following toxicity: This patient may achieve improved results by removing the oxaliplatin portion of their regimen\".         7/11/2023 Initial Diagnosis    Metastatic colon cancer to liver (HCC)     7/13/2023 -  Cancer Staged    Staging form: Colon and Rectum, AJCC 8th Edition  - Clinical stage from 7/13/2023: cT3, cN0, pM1 - Signed by Harika Medina DO on 9/28/2023  Total positive nodes: 0       7/31/2023 -  Chemotherapy    potassium chloride, 20 mEq, Intravenous, Once, 2 of 2 cycles  Administration: 20 mEq (11/6/2023), 20 mEq (11/20/2023)  alteplase (CATHFLO), 2 mg, Intracatheter, Every 1 Minute as needed, 13 of 15 cycles  Administration: 2 mg (1/3/2024)  pegfilgrastim (NEULASTA), 6 mg, Subcutaneous, Once, 7 of 9 cycles  Administration: 6 mg (10/25/2023), 6 mg (11/8/2023), 6 mg (11/22/2023), 6 mg (12/6/2023), 6 mg (12/20/2023), 6 mg (1/12/2024), 6 mg (1/25/2024)  fluorouracil (ADRUCIL), 895 mg, Intravenous, Once, 13 of 15 cycles  Dose modification: 320 " mg/m2 (original dose 400 mg/m2, Cycle 11)  Administration: 900 mg (7/31/2023), 900 mg (8/14/2023), 900 mg (8/28/2023), 900 mg (9/11/2023), 900 mg (9/25/2023), 900 mg (10/9/2023), 900 mg (10/23/2023), 895 mg (11/6/2023), 895 mg (11/20/2023), 895 mg (12/4/2023), 700 mg (12/18/2023), 680 mg (1/10/2024), 680 mg (1/23/2024)  nivolumab (OPDIVO) IVPB, 240 mg (100 % of original dose 240 mg), Intravenous, Once, 5 of 7 cycles  Dose modification: 240 mg (original dose 240 mg, Cycle 9)  Administration: 240 mg (11/20/2023), 240 mg (12/4/2023), 240 mg (12/18/2023), 240 mg (1/10/2024), 240 mg (1/23/2024)  leucovorin calcium IVPB, 896 mg, Intravenous, Once, 13 of 15 cycles  Administration: 900 mg (7/31/2023), 900 mg (8/14/2023), 900 mg (8/28/2023), 900 mg (9/11/2023), 900 mg (9/25/2023), 900 mg (10/9/2023), 900 mg (10/23/2023), 900 mg (11/6/2023), 900 mg (11/20/2023), 900 mg (12/4/2023), 900 mg (12/18/2023), 850 mg (1/10/2024), 850 mg (1/23/2024)  oxaliplatin (ELOXATIN) chemo infusion, 85 mg/m2 = 190.4 mg, Intravenous, Once, 12 of 12 cycles  Dose modification: 68 mg/m2 (original dose 85 mg/m2, Cycle 11, Reason: Other (Must fill in a comment), Comment: increased fatigue)  Administration: 190.4 mg (7/31/2023), 190.4 mg (8/14/2023), 200 mg (8/28/2023), 200 mg (9/11/2023), 200 mg (9/25/2023), 200 mg (10/9/2023), 200 mg (10/23/2023), 200 mg (11/6/2023), 200 mg (11/20/2023), 190.4 mg (12/4/2023), 150 mg (12/18/2023), 150 mg (1/10/2024)  fluorouracil (ADRUCIL) ambulatory infusion Soln, 1,200 mg/m2/day = 5,375 mg, Intravenous, Over 46 hours, 13 of 15 cycles     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HCC)   7/27/2023 Initial Diagnosis    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HCC)     7/27/2023 -  Cancer Staged    Staging form: Pharynx - Oropharynx, AJCC 8th Edition  - Clinical stage from 7/27/2023: Stage III (cT1, cN3, cM0, p16+) - Signed by Evan Plummer MD on 7/27/2023  Stage prefix: Initial diagnosis           History of  Present Illness  :   This is a 68-year-old female seen today with a new right neck nodule.  Briefly, the patient presented to her primary care physician due to what she describes as throat soreness.  A large right neck nodule was identified, prompting a CT scan of the neck.  This demonstrated a near 5 cm right level 2 lymph node with multicystic components.  It also detected a right heterogeneous thyroid nodule of about 2 cm which did not appear to be locally invasive. The patient denies any significant symptoms of throat pain, trouble breathing, trouble swallowing, pain with swallowing, voice changes.  She does describe a sore throat for months if not years.  She states that the right sided neck nodule became enlarged over the last couple of months but it has not really bothered her.  She does have a history of right breast cancer treated with breast conservation in 2018.  Her mammograms since that time have been benign.  She denies any other lumps or bumps of the left neck, axilla, inguinal regions.  She denies any fevers, chills, night sweats, weight loss, other systemic symptoms.    At this juncture, given that she does not have any symptoms concerning for lymphoma and her enlarged lymph nodes are limited to the right neck where she also has a right thyroid nodule, I am concerned about a lymph node involved thyroid cancer.  I would like her to undergo an FNA of the right neck node to determine a baseline histology.  Likewise, she will need to undergo the thyroid ultrasound she is scheduled for as well as a potential right thyroid biopsy depending on these results.    Interval 6/7/23  Patient presents today for interval follow-up after the above investigations.  Thyroid ultrasound revealed a spongiform nodule with questionable criteria for biopsy.  Percutaneous FNA of the enlarged right cervical node revealed carcinoma, however, they were not able to determine tissue of etiology given the scant tissue.  The  patient's symptoms are largely unchanged.    8/2023  Two cycles FOLFOX completed for metastatic colon ca to liver. Also diagnosed with SCC at tonsil with mets to nodes. Plan for eventual XRT it appears. Doing well - swallowing well. Poor appetite but working on it. No abd pain, n/v, trouble with BM    1/2024  Pt seen in f/u. She is status post 12 total cycles of FOLFOX with nivolumab added in November.  PET scan prior to this visit shows good control of all areas of disease with the exception of increase in the primary colon mass. Her SCC is now un-detectable by PET - she has not received XRT for this. 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 Last 5FU nivo one week ago. Does suffer with constipation. No blood per rectum.     Today we discussed robo right colectomy and risks to include infection, bleeding, anastomotic leak.  The patient has not had surgery within her abdomen before and thus her risks for completion robotically are low, however, she is obese and this does increase her risks.  I like to discuss with Dr. Medina what it would look like to come off therapy.  Likewise, she will be discussed at our tumor board.  Finally, I like to discuss management of her squamous cell cancer and whether the Nivo has been effective for this and if holding it will be risky for recurrence.  Will plan on robotic right colectomy in roughly 5 weeks which would be 6 weeks from her most recent treatment.  She will also need cardiac clearance given her history.      3/2024  Discussed at  as well as with Dr Medina and will plan on right colon. Cardiac eval obtained and pt at acceptable level of risk without need for further testing. Doing well today with no new symptoms.       Review of Systems  Complete ROS Surg Onc:   Constitutional: The patient denies new or recent history of general fatigue, no recent weight loss, no change in appetite.   Eyes: No complaints of visual problems, no  scleral icterus.   ENT: No complaints of ear pain, no hoarseness, no difficulty swallowing,  no tinnitus and no new masses in head, oral cavity, or neck.   Cardiovascular: No complaints of chest pain, no palpitations, no ankle edema.   Respiratory: No complaints of shortness of breath, no cough.   Gastrointestinal: No complaints of jaundice, no bloody stools, no pale stools.   Genitourinary: No complaints of dysuria, no hematuria, no nocturia, no frequent urination, no urethral discharge.   Musculoskeletal: No complaints of weakness, paralysis, joint stiffness or arthralgias.  Integumentary: No complaints of rash, no new lesions.   Neurological: No complaints of convulsions, no seizures, no dizziness.   Hematologic/Lymphatic: No complaints of easy bruising.   Endocrine:  No hot or cold intolerance.  No polydipsia, polyphagia, or polyuria.  Allergy/immunology:  No environmental allergies.  No food allergies.  Not immunocompromised.      Patient Active Problem List   Diagnosis    Primary hypertension    Mixed hyperlipidemia    Malignant neoplasm of right female breast (HCC)    Vitamin D deficiency    Prediabetes    Right thyroid nodule    GERD (gastroesophageal reflux disease)    Obesity    Metastatic colon cancer to liver (HCC)    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HCC)    Poor appetite    Anemia    Dehydration    Drug-induced constipation    Chemotherapy induced neutropenia     Stage 3 chronic kidney disease, unspecified whether stage 3a or 3b CKD (HCC)    Platelets decreased (HCC)    Neuropathy    Diastolic dysfunction     Past Medical History:   Diagnosis Date    Anemia     Arthritis     Body mass index (BMI) 40.0-44.9, adult (HCC)     Breast cancer (HCC) 12/17/2018    Cancer (HCC)     right breast, colon, liver, right tonsil    Cervical lymphadenopathy     CT neck on 3/30/2023- Large right level 2A lymphadenopathy as described above and suspicious for neoplasm.  Correlation with histopathology is  recommended.  Mild asymmetric prominence of the right palatine tonsil with otherwise no definitive nodular enhancing lesions identified along the course of the aerodigestive tract.     5/26/23- FNA of this node was nonrevealing for tissue etiology, but it d    Encounter for screening for HIV 07/07/2022    Follow-up examination 04/04/2023    GERD (gastroesophageal reflux disease)     Hyperlipidemia     Hypertension     Obesity     Stroke (HCC)     TIA     Stroke (HCC)     TIA     Transaminitis 09/22/2021    Vasomotor rhinitis     Refilled flonase today. Stopped taking since January 2022     Past Surgical History:   Procedure Laterality Date    BREAST BIOPSY Right 11/23/2018    us guided bx cancer    BREAST SURGERY      COLONOSCOPY      ESOPHAGOSCOPY N/A 07/20/2023    Procedure: ESOPHAGOSCOPY;  Surgeon: David Chapa MD;  Location: [facility];  Service: ENT    IR BIOPSY LIVER MASS  06/27/2023    IR PORT CHECK  01/03/2024    IR PORT PLACEMENT  07/28/2023    IR PORT STRIPPING  01/09/2024    MS BRNCHSC INCL FLUOR GDNCE DX W/CELL WASHG SPX N/A 07/20/2023    Procedure: BRONCHOSCOPY;  Surgeon: David Chapa MD;  Location: [facility];  Service: ENT    MS LARYNGOSCOPY W/BIOPSY MICROSCOPE/TELESCOPE N/A 07/20/2023    Procedure: MICRODIRECT LARYNGOSCOPY WITH BIOPSY;  Surgeon: David Chapa MD;  Location: [facility];  Service: ENT    MS MASTECTOMY PARTIAL W/AXILLARY LYMPHADENECTOMY Right 12/20/2018    Procedure: ULTRASOUND LOCALIZED PARTIAL MASTECTOMY W/SENTINEL NODE BIOPSY POSS AXILLARY DISSECTION;  Surgeon: Zahida Coronado MD;  Location: [facility];  Service: General    TUBAL LIGATION      US GUIDED BREAST BIOPSY RIGHT COMPLETE Right 11/23/2018    US GUIDED INJECTION FOR RESEARCH STUDY  12/20/2018    US GUIDED INJECTION FOR RESEARCH STUDY  12/07/2018    US GUIDED INJECTION FOR RESEARCH STUDY  05/03/2019    US GUIDED LYMPH NODE BIOPSY RIGHT  05/26/2023     Family History   Problem Relation Age of Onset    Colon  cancer Mother 58    Hypertension Mother     Pancreatic cancer Father 60    Hypertension Daughter     Hypertension Son      Social History     Socioeconomic History    Marital status: Single     Spouse name: Not on file    Number of children: Not on file    Years of education: Not on file    Highest education level: Not on file   Occupational History    Not on file   Tobacco Use    Smoking status: Never     Passive exposure: Never    Smokeless tobacco: Never    Tobacco comments:     NO TOBACCO USE   Vaping Use    Vaping status: Never Used   Substance and Sexual Activity    Alcohol use: Never    Drug use: Never    Sexual activity: Not on file   Other Topics Concern    Not on file   Social History Narrative    Not on file     Social Determinants of Health     Financial Resource Strain: Low Risk  (10/9/2023)    Overall Financial Resource Strain (CARDIA)     Difficulty of Paying Living Expenses: Not hard at all   Food Insecurity: No Food Insecurity (10/9/2023)    Hunger Vital Sign     Worried About Running Out of Food in the Last Year: Never true     Ran Out of Food in the Last Year: Never true   Transportation Needs: No Transportation Needs (10/9/2023)    PRAPARE - Transportation     Lack of Transportation (Medical): No     Lack of Transportation (Non-Medical): No   Physical Activity: Not on file   Stress: Not on file   Social Connections: Not on file   Intimate Partner Violence: Not on file   Housing Stability: Not on file       Current Outpatient Medications:     anastrozole (ARIMIDEX) 1 mg tablet, Take 1 tablet (1 mg total) by mouth daily, Disp: 30 tablet, Rfl: 0    atorvastatin (LIPITOR) 40 mg tablet, Take 1 tablet (40 mg total) by mouth daily at bedtime, Disp: 30 tablet, Rfl: 2    Cyanocobalamin (B-12 PO), Take by mouth, Disp: , Rfl:     docusate sodium (COLACE) 50 mg capsule, Take 1 capsule (50 mg total) by mouth 2 (two) times a day, Disp: 90 capsule, Rfl: 1    ergocalciferol (VITAMIN D2) 50,000 units, Take 1  capsule (50,000 Units total) by mouth once a week, Disp: 90 capsule, Rfl: 3    folic acid (FOLVITE) 1 mg tablet, Take 1 tablet (1 mg total) by mouth in the morning, Disp: 90 tablet, Rfl: 3    lidocaine-prilocaine (EMLA) cream, Apply topically as needed for mild pain, Disp: 30 g, Rfl: 3    losartan (COZAAR) 50 mg tablet, Take 1 tablet (50 mg total) by mouth daily, Disp: 90 tablet, Rfl: 1    mirtazapine (REMERON) 30 mg tablet, Take 1 tablet (30 mg total) by mouth daily at bedtime, Disp: 30 tablet, Rfl: 3    omeprazole (PriLOSEC) 20 mg delayed release capsule, Take 1 capsule (20 mg total) by mouth daily, Disp: 30 capsule, Rfl: 5    ondansetron (ZOFRAN) 8 mg tablet, Take 1 tablet (8 mg total) by mouth every 8 (eight) hours as needed for nausea or vomiting, Disp: 30 tablet, Rfl: 3    potassium chloride (K-DUR,KLOR-CON) 20 mEq tablet, Take 1 tablet (20 mEq total) by mouth 2 (two) times a day, Disp: 60 tablet, Rfl: 1    prochlorperazine (COMPAZINE) 10 mg tablet, Take 1 tablet (10 mg total) by mouth every 6 (six) hours as needed for nausea or vomiting, Disp: 30 tablet, Rfl: 3    pyridoxine (VITAMIN B6) 50 mg tablet, Take 1 tablet (50 mg total) by mouth daily, Disp: 90 tablet, Rfl: 0  No Known Allergies    Vitals:    03/05/24 1257   BP: 132/76   Pulse: (!) 116   Resp: 18   Temp: 97.9 °F (36.6 °C)   SpO2: 99%       Physical Exam   General: Appears well, appears stated age  Skin: Warm, anicteric  HEENT: Normocephalic, atraumatic; sclera aniceteric, mucous membranes moist; cervical nodes without adenopathy. RIGHT neck firm nodule at inf margin of mandible appreciated  Cardiopulmonary: RRR, Easy WOB, no BLE edema  Abd: Flat and soft, nontender, no masses appreciated, no hepatosplenomegaly  MSK: Symmetric, no cyanosis, no overt weakness  Lymphatic: No cervical, axillary or inguinal lymphadenopathy  Neuro: Affect appropriate, no gross motor abnormalities      Pathology:  Pending    Labs: Reviewed in EPIC    Imaging  CT soft tissue  neck w contrast    Result Date: 4/4/2023  Narrative: CT NECK WITH CONTRAST INDICATION:   I88.9: Nonspecific lymphadenitis, unspecified.  Right-sided neck swelling for one month. COMPARISON:  None. TECHNIQUE:  Axial, sagittal, and coronal 2D reformatted images were created from the axial source data and submitted for interpretation. Radiation dose length product (DLP) for this visit:  352 mGy-cm .  This examination, like all CT scans performed in the [facility], was performed utilizing techniques to minimize radiation dose exposure, including the use of iterative reconstruction and automated exposure control. IV Contrast:  85 mL of iohexol (OMNIPAQUE) IMAGE QUALITY:  Diagnostic. FINDINGS: VISUALIZED BRAIN PARENCHYMA:  No acute intracranial pathology of the visualized brain parenchyma. VISUALIZED ORBITS: Normal visualized orbits. PARANASAL SINUSES: There is chronic opacification of the right sphenoid sinus with surrounding chronic osteitis.  The medial bony wall along the petrous portion of the right internal carotid artery also appears dehiscent with the adjacent sphenoid sinus.   NASAL CAVITY AND NASOPHARYNX:  Normal. SUPRAHYOID NECK:  There is mild asymmetric prominence of the right palatine tonsil with otherwise no discrete underlying nodular enhancing lesion.  Lingual tonsils appear grossly unremarkable. INFRAHYOID NECK:  Aryepiglottic folds and piriform sinuses are normal.  Normal glottis and subglottic airway. THYROID GLAND:  There is a right thyroid lobe nodule measuring 1.5 x 1.7 cm on series 3, image 47. PAROTID AND SUBMANDIBULAR GLANDS:  Normal. LYMPH NODES:  There is a large right level 2A lymph node with internal cystic change measuring 2.2 x 3.7 x 4.7 cm.  This results in mass effect and anterior displacement of the right submandibular gland. VASCULAR STRUCTURES:  Normal enhancement of the cervical vasculature. THORACIC INLET:  Lung apices and upper mediastinum are unremarkable. BONY  STRUCTURES: No acute fracture or destructive osseous lesion.     Impression: Large right level 2A lymphadenopathy as described above and suspicious for neoplasm.  Correlation with histopathology is recommended. Mild asymmetric prominence of the right palatine tonsil with otherwise no definitive nodular enhancing lesions identified along the course of the aerodigestive tract. Heterogeneous right thyroid lobe nodule.  Ultrasound is recommended for further evaluation.  I personally discussed this study with ERICA ANNE on 4/4/2023 at 9:21 AM. Workstation performed: [id number]       I independently reviewed and interpreted the above laboratory and imaging data, including CT of the neck, breast history, pathology, thyroid US. ENT notes, op notes, path, med onc notes, recent PET.       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 On systemic treatment and has completed 12 cycles folfox. Now on nivo and maintenance 5FU and received last dose one week ago. Recent PET with activity only in colon mass which is more avid than before. Has been discussed at  with agreement for robo right colectomy as discussed previously, Will proceed.

## 2024-03-05 NOTE — PROGRESS NOTES
Surgical Oncology Consultation F/U    [facility] [place]  [facility] [address] [phone]    Patient:  Maira Moura  1955  [id number]    Primary Care provider:  Fanta Turner MD  [address]  [facility] [address]    Referring provider:  No referring provider defined for this encounter.    Diagnoses and all orders for this visit:    Metastatic colon cancer to liver (HCC)        Chief Complaint   Patient presents with    Follow-up       No follow-ups on file.    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, unresectable    7/31/2023 - FOLFOX    11/20/2023 - opdivo added to FOLFOX    12/18/2023-cycle 11-20% dose reduction of 5-FU bolus and oxaliplatin due to increased fatigue    1/2023 - oxaliplatin removed from treatment plan due to neuropathy - PET scan shows good disease control in all areas except colon lesion     Malignant neoplasm of right female breast (HCC)   11/23/2018 Initial Diagnosis    Malignant neoplasm of right female breast (HCC)     11/23/2018 Biopsy    Rt Breast US BX 1100 8cmfn, 4 passes 12g Marquee:  - Invasive breast carcinoma of no special type (ductal NST/invasive ductal carcinoma).  - grade 2  - %  - AZ 95%  - HER2 negative     12/20/2018 Surgery    Right partial mastectomy and SLN biopsy:  Infiltrating ductal carcinoma, Grade 2/3, felt to be between 2.0 cm and 2.5 cm in greatest dimension  - No invasive tumor is seen at inked margins, but there is a focus of DCIS seen within approximately 1mm of the inked medial margin  - no LVI  - no LCIS  - 0/2 LN's  at least Stage IIA - pT2, pN0, cM0, G2.    - Dr Coronado     12/20/2018 -  Cancer Staged     "Stage IIA - pT2, pN0, cM0, G2.       1/4/2019 Genomic Testing    Oncotype DX score: 13     2/1/2019 -  Hormone Therapy    anastrozole 1 mg daily as adjuvant endocrine therapy    - Dr Daley       2/14/2019 - 4/3/2019 Radiation    Course: C1    Plan ID Energy Fractions Dose per Fraction (cGy) Dose Correction (cGy) Total Dose Delivered (cGy) Elapsed Days   R Breast 10X/6X 25 / 25 200 0 5,000 40   R BRST BOOST 16E 5 / 5 200 0 1,000 7      Treatment dates:  C1: 2/14/2019 - 4/3/2019       Metastatic colon cancer to liver (HCC)   7/6/2023 Genetic Testing    CARIS Results:   KRAS Pathogenic Variant Exon 2  p.G12D : lack of benefit of cetuximab, panitumumab Level 2   No other actionable mutation; MSI stable; TMB low   MiFOLOXAi Results: \"Decreased Benefit of FOLFOX + Bevacizumab in first line metastatic CRC.  This patient may achieve improved results by receiving an alternative to FOLFOX, such as FOLFIRI, as their initial regimen. As an adjustment to frontline FOLFOXIRI following toxicity: This patient may achieve improved results by removing the oxaliplatin portion of their regimen\".         7/11/2023 Initial Diagnosis    Metastatic colon cancer to liver (HCC)     7/13/2023 -  Cancer Staged    Staging form: Colon and Rectum, AJCC 8th Edition  - Clinical stage from 7/13/2023: cT3, cN0, pM1 - Signed by Harika Medina DO on 9/28/2023  Total positive nodes: 0       7/31/2023 -  Chemotherapy    potassium chloride, 20 mEq, Intravenous, Once, 2 of 2 cycles  Administration: 20 mEq (11/6/2023), 20 mEq (11/20/2023)  alteplase (CATHFLO), 2 mg, Intracatheter, Every 1 Minute as needed, 13 of 15 cycles  Administration: 2 mg (1/3/2024)  pegfilgrastim (NEULASTA), 6 mg, Subcutaneous, Once, 7 of 9 cycles  Administration: 6 mg (10/25/2023), 6 mg (11/8/2023), 6 mg (11/22/2023), 6 mg (12/6/2023), 6 mg (12/20/2023), 6 mg (1/12/2024), 6 mg (1/25/2024)  fluorouracil (ADRUCIL), 895 mg, Intravenous, Once, 13 of 15 cycles  Dose modification: 320 " mg/m2 (original dose 400 mg/m2, Cycle 11)  Administration: 900 mg (7/31/2023), 900 mg (8/14/2023), 900 mg (8/28/2023), 900 mg (9/11/2023), 900 mg (9/25/2023), 900 mg (10/9/2023), 900 mg (10/23/2023), 895 mg (11/6/2023), 895 mg (11/20/2023), 895 mg (12/4/2023), 700 mg (12/18/2023), 680 mg (1/10/2024), 680 mg (1/23/2024)  nivolumab (OPDIVO) IVPB, 240 mg (100 % of original dose 240 mg), Intravenous, Once, 5 of 7 cycles  Dose modification: 240 mg (original dose 240 mg, Cycle 9)  Administration: 240 mg (11/20/2023), 240 mg (12/4/2023), 240 mg (12/18/2023), 240 mg (1/10/2024), 240 mg (1/23/2024)  leucovorin calcium IVPB, 896 mg, Intravenous, Once, 13 of 15 cycles  Administration: 900 mg (7/31/2023), 900 mg (8/14/2023), 900 mg (8/28/2023), 900 mg (9/11/2023), 900 mg (9/25/2023), 900 mg (10/9/2023), 900 mg (10/23/2023), 900 mg (11/6/2023), 900 mg (11/20/2023), 900 mg (12/4/2023), 900 mg (12/18/2023), 850 mg (1/10/2024), 850 mg (1/23/2024)  oxaliplatin (ELOXATIN) chemo infusion, 85 mg/m2 = 190.4 mg, Intravenous, Once, 12 of 12 cycles  Dose modification: 68 mg/m2 (original dose 85 mg/m2, Cycle 11, Reason: Other (Must fill in a comment), Comment: increased fatigue)  Administration: 190.4 mg (7/31/2023), 190.4 mg (8/14/2023), 200 mg (8/28/2023), 200 mg (9/11/2023), 200 mg (9/25/2023), 200 mg (10/9/2023), 200 mg (10/23/2023), 200 mg (11/6/2023), 200 mg (11/20/2023), 190.4 mg (12/4/2023), 150 mg (12/18/2023), 150 mg (1/10/2024)  fluorouracil (ADRUCIL) ambulatory infusion Soln, 1,200 mg/m2/day = 5,375 mg, Intravenous, Over 46 hours, 13 of 15 cycles     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HCC)   7/27/2023 Initial Diagnosis    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HCC)     7/27/2023 -  Cancer Staged    Staging form: Pharynx - Oropharynx, AJCC 8th Edition  - Clinical stage from 7/27/2023: Stage III (cT1, cN3, cM0, p16+) - Signed by Evan Plummer MD on 7/27/2023  Stage prefix: Initial diagnosis           History of  Present Illness  :   This is a 68-year-old female seen today with a new right neck nodule.  Briefly, the patient presented to her primary care physician due to what she describes as throat soreness.  A large right neck nodule was identified, prompting a CT scan of the neck.  This demonstrated a near 5 cm right level 2 lymph node with multicystic components.  It also detected a right heterogeneous thyroid nodule of about 2 cm which did not appear to be locally invasive. The patient denies any significant symptoms of throat pain, trouble breathing, trouble swallowing, pain with swallowing, voice changes.  She does describe a sore throat for months if not years.  She states that the right sided neck nodule became enlarged over the last couple of months but it has not really bothered her.  She does have a history of right breast cancer treated with breast conservation in 2018.  Her mammograms since that time have been benign.  She denies any other lumps or bumps of the left neck, axilla, inguinal regions.  She denies any fevers, chills, night sweats, weight loss, other systemic symptoms.    At this juncture, given that she does not have any symptoms concerning for lymphoma and her enlarged lymph nodes are limited to the right neck where she also has a right thyroid nodule, I am concerned about a lymph node involved thyroid cancer.  I would like her to undergo an FNA of the right neck node to determine a baseline histology.  Likewise, she will need to undergo the thyroid ultrasound she is scheduled for as well as a potential right thyroid biopsy depending on these results.    Interval 6/7/23  Patient presents today for interval follow-up after the above investigations.  Thyroid ultrasound revealed a spongiform nodule with questionable criteria for biopsy.  Percutaneous FNA of the enlarged right cervical node revealed carcinoma, however, they were not able to determine tissue of etiology given the scant tissue.  The  patient's symptoms are largely unchanged.    8/2023  Two cycles FOLFOX completed for metastatic colon ca to liver. Also diagnosed with SCC at tonsil with mets to nodes. Plan for eventual XRT it appears. Doing well - swallowing well. Poor appetite but working on it. No abd pain, n/v, trouble with BM    1/2024  Pt seen in f/u. She is status post 12 total cycles of FOLFOX with nivolumab added in November.  PET scan prior to this visit shows good control of all areas of disease with the exception of increase in the primary colon mass. Her SCC is now un-detectable by PET - she has not received XRT for this. 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 Last 5FU nivo one week ago. Does suffer with constipation. No blood per rectum.     Today we discussed robo right colectomy and risks to include infection, bleeding, anastomotic leak.  The patient has not had surgery within her abdomen before and thus her risks for completion robotically are low, however, she is obese and this does increase her risks.  I like to discuss with Dr. Medina what it would look like to come off therapy.  Likewise, she will be discussed at our tumor board.  Finally, I like to discuss management of her squamous cell cancer and whether the Nivo has been effective for this and if holding it will be risky for recurrence.  Will plan on robotic right colectomy in roughly 5 weeks which would be 6 weeks from her most recent treatment.  She will also need cardiac clearance given her history.      3/2024  Discussed at  as well as with Dr Medina and will plan on right colon. Cardiac eval obtained and pt at acceptable level of risk without need for further testing. Doing well today with no new symptoms.       Review of Systems  Complete ROS Surg Onc:   Constitutional: The patient denies new or recent history of general fatigue, no recent weight loss, no change in appetite.   Eyes: No complaints of visual problems, no  scleral icterus.   ENT: No complaints of ear pain, no hoarseness, no difficulty swallowing,  no tinnitus and no new masses in head, oral cavity, or neck.   Cardiovascular: No complaints of chest pain, no palpitations, no ankle edema.   Respiratory: No complaints of shortness of breath, no cough.   Gastrointestinal: No complaints of jaundice, no bloody stools, no pale stools.   Genitourinary: No complaints of dysuria, no hematuria, no nocturia, no frequent urination, no urethral discharge.   Musculoskeletal: No complaints of weakness, paralysis, joint stiffness or arthralgias.  Integumentary: No complaints of rash, no new lesions.   Neurological: No complaints of convulsions, no seizures, no dizziness.   Hematologic/Lymphatic: No complaints of easy bruising.   Endocrine:  No hot or cold intolerance.  No polydipsia, polyphagia, or polyuria.  Allergy/immunology:  No environmental allergies.  No food allergies.  Not immunocompromised.      Patient Active Problem List   Diagnosis    Primary hypertension    Mixed hyperlipidemia    Malignant neoplasm of right female breast (HCC)    Vitamin D deficiency    Prediabetes    Right thyroid nodule    GERD (gastroesophageal reflux disease)    Obesity    Metastatic colon cancer to liver (HCC)    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HCC)    Poor appetite    Anemia    Dehydration    Drug-induced constipation    Chemotherapy induced neutropenia     Stage 3 chronic kidney disease, unspecified whether stage 3a or 3b CKD (HCC)    Platelets decreased (HCC)    Neuropathy    Diastolic dysfunction     Past Medical History:   Diagnosis Date    Anemia     Arthritis     Body mass index (BMI) 40.0-44.9, adult (HCC)     Breast cancer (HCC) 12/17/2018    Cancer (HCC)     right breast, colon, liver, right tonsil    Cervical lymphadenopathy     CT neck on 3/30/2023- Large right level 2A lymphadenopathy as described above and suspicious for neoplasm.  Correlation with histopathology is  recommended.  Mild asymmetric prominence of the right palatine tonsil with otherwise no definitive nodular enhancing lesions identified along the course of the aerodigestive tract.     5/26/23- FNA of this node was nonrevealing for tissue etiology, but it d    Encounter for screening for HIV 07/07/2022    Follow-up examination 04/04/2023    GERD (gastroesophageal reflux disease)     Hyperlipidemia     Hypertension     Obesity     Stroke (HCC)     TIA     Stroke (HCC)     TIA     Transaminitis 09/22/2021    Vasomotor rhinitis     Refilled flonase today. Stopped taking since January 2022     Past Surgical History:   Procedure Laterality Date    BREAST BIOPSY Right 11/23/2018    us guided bx cancer    BREAST SURGERY      COLONOSCOPY      ESOPHAGOSCOPY N/A 07/20/2023    Procedure: ESOPHAGOSCOPY;  Surgeon: David Chapa MD;  Location: [facility];  Service: ENT    IR BIOPSY LIVER MASS  06/27/2023    IR PORT CHECK  01/03/2024    IR PORT PLACEMENT  07/28/2023    IR PORT STRIPPING  01/09/2024    DE BRNCHSC INCL FLUOR GDNCE DX W/CELL WASHG SPX N/A 07/20/2023    Procedure: BRONCHOSCOPY;  Surgeon: David Chapa MD;  Location: [facility];  Service: ENT    DE LARYNGOSCOPY W/BIOPSY MICROSCOPE/TELESCOPE N/A 07/20/2023    Procedure: MICRODIRECT LARYNGOSCOPY WITH BIOPSY;  Surgeon: David Chapa MD;  Location: [facility];  Service: ENT    DE MASTECTOMY PARTIAL W/AXILLARY LYMPHADENECTOMY Right 12/20/2018    Procedure: ULTRASOUND LOCALIZED PARTIAL MASTECTOMY W/SENTINEL NODE BIOPSY POSS AXILLARY DISSECTION;  Surgeon: Zahida Coronado MD;  Location: [facility];  Service: General    TUBAL LIGATION      US GUIDED BREAST BIOPSY RIGHT COMPLETE Right 11/23/2018    US GUIDED INJECTION FOR RESEARCH STUDY  12/20/2018    US GUIDED INJECTION FOR RESEARCH STUDY  12/07/2018    US GUIDED INJECTION FOR RESEARCH STUDY  05/03/2019    US GUIDED LYMPH NODE BIOPSY RIGHT  05/26/2023     Family History   Problem Relation Age of Onset    Colon  cancer Mother 58    Hypertension Mother     Pancreatic cancer Father 60    Hypertension Daughter     Hypertension Son      Social History     Socioeconomic History    Marital status: Single     Spouse name: Not on file    Number of children: Not on file    Years of education: Not on file    Highest education level: Not on file   Occupational History    Not on file   Tobacco Use    Smoking status: Never     Passive exposure: Never    Smokeless tobacco: Never    Tobacco comments:     NO TOBACCO USE   Vaping Use    Vaping status: Never Used   Substance and Sexual Activity    Alcohol use: Never    Drug use: Never    Sexual activity: Not on file   Other Topics Concern    Not on file   Social History Narrative    Not on file     Social Determinants of Health     Financial Resource Strain: Low Risk  (10/9/2023)    Overall Financial Resource Strain (CARDIA)     Difficulty of Paying Living Expenses: Not hard at all   Food Insecurity: No Food Insecurity (10/9/2023)    Hunger Vital Sign     Worried About Running Out of Food in the Last Year: Never true     Ran Out of Food in the Last Year: Never true   Transportation Needs: No Transportation Needs (10/9/2023)    PRAPARE - Transportation     Lack of Transportation (Medical): No     Lack of Transportation (Non-Medical): No   Physical Activity: Not on file   Stress: Not on file   Social Connections: Not on file   Intimate Partner Violence: Not on file   Housing Stability: Not on file       Current Outpatient Medications:     anastrozole (ARIMIDEX) 1 mg tablet, Take 1 tablet (1 mg total) by mouth daily, Disp: 30 tablet, Rfl: 0    atorvastatin (LIPITOR) 40 mg tablet, Take 1 tablet (40 mg total) by mouth daily at bedtime, Disp: 30 tablet, Rfl: 2    Cyanocobalamin (B-12 PO), Take by mouth, Disp: , Rfl:     docusate sodium (COLACE) 50 mg capsule, Take 1 capsule (50 mg total) by mouth 2 (two) times a day, Disp: 90 capsule, Rfl: 1    ergocalciferol (VITAMIN D2) 50,000 units, Take 1  capsule (50,000 Units total) by mouth once a week, Disp: 90 capsule, Rfl: 3    folic acid (FOLVITE) 1 mg tablet, Take 1 tablet (1 mg total) by mouth in the morning, Disp: 90 tablet, Rfl: 3    lidocaine-prilocaine (EMLA) cream, Apply topically as needed for mild pain, Disp: 30 g, Rfl: 3    losartan (COZAAR) 50 mg tablet, Take 1 tablet (50 mg total) by mouth daily, Disp: 90 tablet, Rfl: 1    mirtazapine (REMERON) 30 mg tablet, Take 1 tablet (30 mg total) by mouth daily at bedtime, Disp: 30 tablet, Rfl: 3    omeprazole (PriLOSEC) 20 mg delayed release capsule, Take 1 capsule (20 mg total) by mouth daily, Disp: 30 capsule, Rfl: 5    ondansetron (ZOFRAN) 8 mg tablet, Take 1 tablet (8 mg total) by mouth every 8 (eight) hours as needed for nausea or vomiting, Disp: 30 tablet, Rfl: 3    potassium chloride (K-DUR,KLOR-CON) 20 mEq tablet, Take 1 tablet (20 mEq total) by mouth 2 (two) times a day, Disp: 60 tablet, Rfl: 1    prochlorperazine (COMPAZINE) 10 mg tablet, Take 1 tablet (10 mg total) by mouth every 6 (six) hours as needed for nausea or vomiting, Disp: 30 tablet, Rfl: 3    pyridoxine (VITAMIN B6) 50 mg tablet, Take 1 tablet (50 mg total) by mouth daily, Disp: 90 tablet, Rfl: 0  No Known Allergies    Vitals:    03/05/24 1257   BP: 132/76   Pulse: (!) 116   Resp: 18   Temp: 97.9 °F (36.6 °C)   SpO2: 99%       Physical Exam   General: Appears well, appears stated age  Skin: Warm, anicteric  HEENT: Normocephalic, atraumatic; sclera aniceteric, mucous membranes moist; cervical nodes without adenopathy. RIGHT neck firm nodule at inf margin of mandible appreciated  Cardiopulmonary: RRR, Easy WOB, no BLE edema  Abd: Flat and soft, nontender, no masses appreciated, no hepatosplenomegaly  MSK: Symmetric, no cyanosis, no overt weakness  Lymphatic: No cervical, axillary or inguinal lymphadenopathy  Neuro: Affect appropriate, no gross motor abnormalities      Pathology:  Pending    Labs: Reviewed in EPIC    Imaging  CT soft tissue  neck w contrast    Result Date: 4/4/2023  Narrative: CT NECK WITH CONTRAST INDICATION:   I88.9: Nonspecific lymphadenitis, unspecified.  Right-sided neck swelling for one month. COMPARISON:  None. TECHNIQUE:  Axial, sagittal, and coronal 2D reformatted images were created from the axial source data and submitted for interpretation. Radiation dose length product (DLP) for this visit:  352 mGy-cm .  This examination, like all CT scans performed in the [facility], was performed utilizing techniques to minimize radiation dose exposure, including the use of iterative reconstruction and automated exposure control. IV Contrast:  85 mL of iohexol (OMNIPAQUE) IMAGE QUALITY:  Diagnostic. FINDINGS: VISUALIZED BRAIN PARENCHYMA:  No acute intracranial pathology of the visualized brain parenchyma. VISUALIZED ORBITS: Normal visualized orbits. PARANASAL SINUSES: There is chronic opacification of the right sphenoid sinus with surrounding chronic osteitis.  The medial bony wall along the petrous portion of the right internal carotid artery also appears dehiscent with the adjacent sphenoid sinus.   NASAL CAVITY AND NASOPHARYNX:  Normal. SUPRAHYOID NECK:  There is mild asymmetric prominence of the right palatine tonsil with otherwise no discrete underlying nodular enhancing lesion.  Lingual tonsils appear grossly unremarkable. INFRAHYOID NECK:  Aryepiglottic folds and piriform sinuses are normal.  Normal glottis and subglottic airway. THYROID GLAND:  There is a right thyroid lobe nodule measuring 1.5 x 1.7 cm on series 3, image 47. PAROTID AND SUBMANDIBULAR GLANDS:  Normal. LYMPH NODES:  There is a large right level 2A lymph node with internal cystic change measuring 2.2 x 3.7 x 4.7 cm.  This results in mass effect and anterior displacement of the right submandibular gland. VASCULAR STRUCTURES:  Normal enhancement of the cervical vasculature. THORACIC INLET:  Lung apices and upper mediastinum are unremarkable. BONY  STRUCTURES: No acute fracture or destructive osseous lesion.     Impression: Large right level 2A lymphadenopathy as described above and suspicious for neoplasm.  Correlation with histopathology is recommended. Mild asymmetric prominence of the right palatine tonsil with otherwise no definitive nodular enhancing lesions identified along the course of the aerodigestive tract. Heterogeneous right thyroid lobe nodule.  Ultrasound is recommended for further evaluation.  I personally discussed this study with ERICA ANNE on 4/4/2023 at 9:21 AM. Workstation performed: [id number]       I independently reviewed and interpreted the above laboratory and imaging data, including CT of the neck, breast history, pathology, thyroid US. ENT notes, op notes, path, med onc notes, recent PET.       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 On systemic treatment and has completed 12 cycles folfox. Now on nivo and maintenance 5FU and received last dose one week ago. Recent PET with activity only in colon mass which is more avid than before. Has been discussed at  with agreement for robo right colectomy as discussed previously, Will proceed.

## 2024-03-06 ENCOUNTER — TELEPHONE (OUTPATIENT)
Dept: SURGICAL ONCOLOGY | Facility: CLINIC | Age: 69
End: 2024-03-06

## 2024-03-06 ENCOUNTER — TELEPHONE (OUTPATIENT)
Dept: ANESTHESIOLOGY | Facility: CLINIC | Age: 69
End: 2024-03-06

## 2024-03-06 NOTE — TELEPHONE ENCOUNTER
Patient called in asking if she needs to repeat any blood work for surgery with Dr Israel. I explained that everything looks good and also had Dr Israel's nurse, Dodie, look over everything. No need to repeat any lab work.  Patient was appreciative of this information.

## 2024-03-06 NOTE — TELEPHONE ENCOUNTER
I called and left the patient a message to please call [facility] back for a telephone call to avoid having to move her surgery next week. I left my direct number and also left the number to Ileana who is from [facility].

## 2024-03-07 ENCOUNTER — TELEMEDICINE (OUTPATIENT)
Dept: ANESTHESIOLOGY | Facility: CLINIC | Age: 69
End: 2024-03-07
Payer: MEDICARE

## 2024-03-07 DIAGNOSIS — C18.9 METASTATIC COLON CANCER TO LIVER (HCC): ICD-10-CM

## 2024-03-07 DIAGNOSIS — R73.03 PREDIABETES: ICD-10-CM

## 2024-03-07 DIAGNOSIS — I10 PRIMARY HYPERTENSION: Primary | ICD-10-CM

## 2024-03-07 DIAGNOSIS — N18.30 STAGE 3 CHRONIC KIDNEY DISEASE, UNSPECIFIED WHETHER STAGE 3A OR 3B CKD (HCC): ICD-10-CM

## 2024-03-07 DIAGNOSIS — C78.7 METASTATIC COLON CANCER TO LIVER (HCC): ICD-10-CM

## 2024-03-07 DIAGNOSIS — I63.9 CEREBROVASCULAR ACCIDENT (CVA), UNSPECIFIED MECHANISM (HCC): ICD-10-CM

## 2024-03-07 PROCEDURE — 99443 PR PHYS/QHP TELEPHONE EVALUATION 21-30 MIN: CPT | Performed by: NURSE PRACTITIONER

## 2024-03-07 NOTE — PROGRESS NOTES
[facility]  CONSULT: GERIATRIC SURGERY   Brief Visit    This Visit is being completed by telephone. The Patient is located at Home and in the following state in which I hold an active license [place]    The patient was identified by name and date of birth. Maira Moura was informed that this is a telemedicine visit and that the visit is being conducted through Telephone.  My office door was closed. No one else was in the room.  She acknowledged consent and understanding of privacy and security of the platform. The patient has agreed to participate and understands they can discontinue the visit at any time.    Patient is aware this is a billable service.     Assessment/Plan:  69 year old female referred to [facility] [place] pre-surgery geriatric screening   Consult Dx: Metastatic colon cancer to liver (HCC) [C18.9, C78.7 (ICD-10-CM)]   She is scheduled on 3.12.24  Case: [id number] Date/Time: 03/12/24 1300   Procedure: RIGHT COLECTOMY WITH ROBOTICS (Right: Abdomen)   Anesthesia type: General   Diagnosis: Metastatic colon cancer to liver (HCC) [C18.9, C78.7]   Pre-op diagnosis: Metastatic colon cancer to liver (HCC) [C18.9, C78.7]   Location: BE OR ROOM 15 / [facility]   Surgeons: Vickie Israel MD     Last anesthesia   No concerns     [facility]  Goals:anxious about surgery   Currently feels well   Back to baseline   Started on BEST   BEST   Breathing- instructed to exercise lungs prior to surgery via deep breathing and cough   Eat- discussed increasing protein intake prior to surgery   Sleep/stress- encouraged 8-10 sleep @ night, stress reduction, avoid sick contacts and handwashing   Train- encouraged to remain active      Pre-diagnosis energy level:4.5/5  Mid [place] energy level: I tolerated chemo well   Current energy level:4.5/5  Goal:  maintain  4.5/5 for upcoming surgery     Last dose of chem JAN 2024    Problem List Items Addressed This Visit       Primary  hypertension -   Stable   No concerns   No needs at this   METS 6   DENIES CP DENIES SOB       Prediabetes  Stable   SSI RISK - low    Latest Reference Range & Units 02/21/24 13:43   Hemoglobin A1C Normal 4.0-5.6%; PreDiabetic 5.7-6.4%; Diabetic >=6.5%; Glycemic control for adults with diabetes <7.0% % 5.5   eAG, EST AVG Glucose mg/dl 111         RESOLVED: Stroke (HCC)  DX 2017  Stable since   No complaints since 2017  C/O double vision- slurred speech         Metastatic colon cancer to liver (HCC)  Found through routine screening   Seen for SO   Seen for geriatrics   Finished chemo   Admits to being in well health today   METS 6   Denies CP &  denies SOB   Reviewed PATS - stable   Started BEST   At risk for post-op GOGO- yes   At risk for post-op SSI- no   BEST   Breathing- instructed to exercise lungs prior to surgery via deep breathing   Eat- discussed increasing protein intake prior to surgery   Sleep/stress- encouraged 8-10 sleep @ night, stress reduction, avoid sick contacts and handwashing   Train- encouraged to remain active            Stage 3 chronic kidney disease, unspecified whether stage 3a or 3b CKD (HCC)   Latest Reference Range & Units 02/21/24 13:43   Sodium 135 - 147 mmol/L 136   Potassium 3.5 - 5.3 mmol/L 4.1   Chloride 96 - 108 mmol/L 102   Carbon Dioxide 21 - 32 mmol/L 26   ANION GAP mmol/L 8   BUN 5 - 25 mg/dL 9   Creatinine 0.60 - 1.30 mg/dL 0.89   GLUCOSE 65 - 140 mg/dL 102   Calcium 8.4 - 10.2 mg/dL 9.8   AST 13 - 39 U/L 78 (H)   ALT 7 - 52 U/L 50   ALK PHOS 34 - 104 U/L 100   Total Protein 6.4 - 8.4 g/dL 7.6   Albumin 3.5 - 5.0 g/dL 3.6   Total Bilirubin 0.20 - 1.00 mg/dL 0.37   GFR, Calculated ml/min/1.73sq m 66   (H): Data is abnormally high             Recent Visits  Date Type Provider Dept   03/06/24 Telephone [facility]   03/05/24 Telephone [facility]   Showing recent visits within past 7 days and meeting all other  requirements  Today's Visits  Date Type Provider Dept   03/07/24 Telemedicine [facility]   Showing today's visits and meeting all other requirements  Future Appointments  No visits were found meeting these conditions.  Showing future appointments within next 150 days and meeting all other requirements       Denies fevers and denies chills  Denies congestion and denies sore throat  Denies chest pain  Denies palpitations  Denies shortness of breath  Denies abdominal pain  Denies nausea, vomiting, and diarrhea  Denies any issues with their skin... example NO open wounds or sores  Denies rashes  Denies difficulty urinating  Denies any issues with their urine... example a dark color or an odor  Denies dizziness  Denies headaches  Denies confusion and denies hallucinations    Admits to being in well health today      Physical Assessment   We did not connect via video  Patient was alert and orientated times 3  Mood appropriate  Thought appropriate    I heard no respiratory distress over the phone today        Subjective   Mrs. Moura is a 69-year-old female who was referred to [facility] for presurgery optimization.  She explains through a routine screening colonoscopy they found an area of concern.  Further investigation led to the diagnosis of colon CA.  She completed chemo.  States she tolerated the chemo well.  Now electing to have surgery to have it removed.    I spoke with Mrs. Moura over the telephone.  We did not connect via video.  She was offered a live visit in the [facility] and declined.  She is pleasant and a pleasure to care for her.  No concerns identified today.  Admits to being in well health today.  Feels anxious about the upcoming surgery.  She lives at home.  She is independent with all ADLs.  Admits to being in well health today.  Denies chest pain and denies shortness of breath.  METS is 6.  Her PAT's are stable.    Her last dose of chemo was January 2024.  States she  tolerated chemo well.  Feels back to her baseline.  Rates herself a 4.5/5 on energy scale.  Feels ready for surgery to move forward.    As always we started our best protocol    As always we discussed having your BEST surgery, and BEST recovery.  Surgery goals reviewed today.      Breathing exercises   Patient was encouraged to begin lung exercises today.  This could be accomplished through deep breathing and cough exercises.      Eating/nutrition   Encouraged patient to increase oral protein intake prior to surgery.    This can be accomplished by consuming chicken, fish, tuna fish, cottage cheese, cheese, eggs, Greek yogurt, and protein shakes as needed.  I encouraged use of protein shakes such ENLIVE.  I also recommended making your own protein shakes with protein powder.   Sleep/Stress management  Patient was encouraged to rest their body prior to surgery.  Encouraged attempting to get 8 hours of sleep at night.  Avoid stress.  Avoid sick contacts.  Encouraged to find a relaxing hobby such as reading, meditation, listening to music.  Training exercises  Patient was encouraged to remain active as possible.  Today bilateral lower extremity generic exercises were taught for muscle strengthening and balance.  All exercises to be done sitting down.     Visit Time  Total Visit Duration: 25 minutes

## 2024-03-07 NOTE — PROGRESS NOTES
See Assessment below...    METS:6.36     As always we discussed having your BEST surgery, and BEST recovery.  Surgery goals reviewed today.      Breathing exercises   Patient was encouraged to begin lung exercises today.  This could be accomplished through deep breathing and cough exercises.  Patient was taught how to use an incentive spirometer.  Return demonstration provided.    Eating/nutrition   Encouraged patient to increase oral protein intake prior to surgery.  This can be accomplished by consuming chicken, fish, tuna fish, cottage cheese, cheese, eggs, Greek yogurt, and protein shakes as needed.  I encouraged use of protein shakes such ENLIVE.  I also recommended making your own protein shakes with protein powder.   Sleep/Stress management  Patient was encouraged to rest their body prior to surgery.  Encouraged attempting to get 8 hours of sleep at night.  Avoid stress.  Avoid sick contacts.  Encouraged to find a relaxing hobby such as reading, meditation, listening to music.  Training exercises  Patient was encouraged to remain active as possible.  Today bilateral lower extremity generic exercises were taught for muscle strengthening and balance.  All exercises to be done sitting down.

## 2024-03-11 NOTE — ANESTHESIA PREPROCEDURE EVALUATION
Procedure:  RIGHT COLECTOMY WITH ROBOTICS (Right: Abdomen)  LAPAROTOMY EXPLORATORY W/ ROBOTICS (Abdomen)    #Metastatic adenocarcinoma of colorectal origin with mets to chest, abdomen, pelvis    #Right tonsillar squamous cell carcinoma    Relevant Problems   CARDIO   (+) Mixed hyperlipidemia   (+) Primary hypertension      GI/HEPATIC   (+) GERD (gastroesophageal reflux disease)   (+) Metastatic colon cancer to liver (HCC)      /RENAL   (+) Stage 3 chronic kidney disease, unspecified whether stage 3a or 3b CKD (HCC)      GYN   (+) Malignant neoplasm of right female breast (HCC)      HEMATOLOGY   (+) Anemia      TTE (10/2023):    Findings    Left Ventricle Left ventricular cavity size is normal. Wall thickness is mildly increased. The left ventricular ejection fraction is 75%. Systolic function is hyperdynamic.  Wall motion is normal. Diastolic function is mildly abnormal, consistent with grade I (abnormal) relaxation.   Right Ventricle Right ventricular cavity size is normal. Systolic function is normal. Wall thickness is normal.   Left Atrium The atrium is normal in size.   Right Atrium The atrium is normal in size.   Aortic Valve The aortic valve is trileaflet. The leaflets are not thickened. The leaflets are not calcified. The leaflets exhibit normal mobility. There is no evidence of regurgitation. The aortic valve has no significant stenosis.   Mitral Valve There is no evidence of regurgitation. There is no evidence of stenosis. The mitral valve has normal structure and normal function.   Tricuspid Valve Tricuspid valve structure is normal. There is trace regurgitation. There is no evidence of stenosis.   Pulmonic Valve Pulmonic valve structure is normal. There is trace regurgitation. There is no evidence of stenosis.   Ascending Aorta The aortic root is normal in size.   IVC/SVC The inferior vena cava is not well visualized.   Pericardium There is no pericardial effusion. The pericardium is normal in  appearance       Past Medical History:   Diagnosis Date    Anemia     Arthritis     Body mass index (BMI) 40.0-44.9, adult (HCC)     Breast cancer (HCC) 12/17/2018    Cancer (HCC)     right breast, colon, liver, right tonsil    Cervical lymphadenopathy     CT neck on 3/30/2023- Large right level 2A lymphadenopathy as described above and suspicious for neoplasm.  Correlation with histopathology is recommended.  Mild asymmetric prominence of the right palatine tonsil with otherwise no definitive nodular enhancing lesions identified along the course of the aerodigestive tract.     5/26/23- FNA of this node was nonrevealing for tissue etiology, but it d    Encounter for screening for HIV 07/07/2022    Follow-up examination 04/04/2023    GERD (gastroesophageal reflux disease)     Hyperlipidemia     Hypertension     Obesity     Stroke (HCC)     TIA     Stroke (HCC)     TIA     Transaminitis 09/22/2021    Vasomotor rhinitis     Refilled flonase today. Stopped taking since January 2022     MRI-Abdomen (07/2023):  IMPRESSION:     Multiple hepatic metastases. Nonocclusive bland thrombus in the anterior right portal vein.     Partially visualized circumferential mural thickening and enhancement of the ascending colon, likely corresponding to the patient's known colonic malignancy.     Thickened endometrium with a 5 mm enhancing endometrial lesion. Differential diagnosis includes endometrial polyp and/or endometrial carcinoma. Recommend further evaluation with pelvic ultrasound.     Right adnexal mass abutting the uterus, likely representing a pedunculated uterine fibroid.    Physical Exam    Airway    Mallampati score: III  TM Distance: >3 FB  Neck ROM: full     Dental    lower dentures and upper dentures,     Cardiovascular  Rhythm: regular, Rate: normal    Pulmonary   Breath sounds clear to auscultation    Other Findings  Intercisor Distance > 3cm    post-pubertal.      Anesthesia Plan  ASA Score- 3     Anesthesia Type-  general with ASA Monitors.         Additional Monitors: arterial line.    Airway Plan: ETT.    Comment: Discussed benefits/risks of general anesthesia including possibility of mouth/throat pain, injury to lips/teeth, nausea/vomiting, and surgical pain along with more rare complications such as stroke, MI, pneumonia, aspiration, and injury to blood vessels. All questions answered.  .       Plan Factors-Exercise tolerance (METS): >4 METS.    Chart reviewed. EKG reviewed.  Existing labs reviewed.                   Induction- intravenous and rapid sequence induction.    Postoperative Plan- Plan for postoperative opioid use. Planned trial extubation    Informed Consent- Anesthetic plan and risks discussed with patient.  I personally reviewed this patient with the CRNA. Discussed and agreed on the Anesthesia Plan with the CRNA..

## 2024-03-12 ENCOUNTER — ANESTHESIA (OUTPATIENT)
Dept: PERIOP | Facility: HOSPITAL | Age: 69
DRG: 330 | End: 2024-03-12
Payer: MEDICARE

## 2024-03-12 ENCOUNTER — HOSPITAL ENCOUNTER (INPATIENT)
Facility: HOSPITAL | Age: 69
LOS: 3 days | Discharge: NURSING, REHAB OR LONG-TERM CARE | DRG: 330 | End: 2024-03-15
Attending: STUDENT IN AN ORGANIZED HEALTH CARE EDUCATION/TRAINING PROGRAM | Admitting: STUDENT IN AN ORGANIZED HEALTH CARE EDUCATION/TRAINING PROGRAM
Payer: MEDICARE

## 2024-03-12 DIAGNOSIS — C78.7 METASTATIC COLON CANCER TO LIVER (HCC): ICD-10-CM

## 2024-03-12 DIAGNOSIS — C18.9 METASTATIC COLON CANCER TO LIVER (HCC): ICD-10-CM

## 2024-03-12 DIAGNOSIS — N18.30 STAGE 3 CHRONIC KIDNEY DISEASE, UNSPECIFIED WHETHER STAGE 3A OR 3B CKD (HCC): Primary | ICD-10-CM

## 2024-03-12 LAB
ABO GROUP BLD: NORMAL
ANION GAP SERPL CALCULATED.3IONS-SCNC: 9 MMOL/L (ref 4–13)
BASE EXCESS BLDA CALC-SCNC: -1 MMOL/L (ref -2–3)
BASE EXCESS BLDA CALC-SCNC: -5.2 MMOL/L
BASE EXCESS BLDA CALC-SCNC: -7 MMOL/L (ref -2–3)
BASOPHILS # BLD AUTO: 0.01 THOUSANDS/ÂΜL (ref 0–0.1)
BASOPHILS NFR BLD AUTO: 0 % (ref 0–1)
BUN SERPL-MCNC: 9 MG/DL (ref 5–25)
CA-I BLD-SCNC: 1.4 MMOL/L (ref 1.12–1.32)
CA-I BLD-SCNC: 1.68 MMOL/L (ref 1.12–1.32)
CALCIUM SERPL-MCNC: 9.7 MG/DL (ref 8.4–10.2)
CHLORIDE SERPL-SCNC: 108 MMOL/L (ref 96–108)
CO2 SERPL-SCNC: 22 MMOL/L (ref 21–32)
CREAT SERPL-MCNC: 1.03 MG/DL (ref 0.6–1.3)
EOSINOPHIL # BLD AUTO: 0.02 THOUSAND/ÂΜL (ref 0–0.61)
EOSINOPHIL NFR BLD AUTO: 0 % (ref 0–6)
ERYTHROCYTE [DISTWIDTH] IN BLOOD BY AUTOMATED COUNT: 16.5 % (ref 11.6–15.1)
GFR SERPL CREATININE-BSD FRML MDRD: 55 ML/MIN/1.73SQ M
GLUCOSE SERPL-MCNC: 160 MG/DL (ref 65–140)
GLUCOSE SERPL-MCNC: 186 MG/DL (ref 65–140)
GLUCOSE SERPL-MCNC: 204 MG/DL (ref 65–140)
GLUCOSE SERPL-MCNC: 205 MG/DL (ref 65–140)
GLUCOSE SERPL-MCNC: 209 MG/DL (ref 65–140)
HCO3 BLDA-SCNC: 21 MMOL/L (ref 22–28)
HCO3 BLDA-SCNC: 21.9 MMOL/L (ref 22–28)
HCO3 BLDA-SCNC: 24.4 MMOL/L (ref 22–28)
HCT VFR BLD AUTO: 25 % (ref 34.8–46.1)
HCT VFR BLD CALC: 21 % (ref 34.8–46.1)
HCT VFR BLD CALC: 28 % (ref 34.8–46.1)
HGB BLD-MCNC: 8.3 G/DL (ref 11.5–15.4)
HGB BLDA-MCNC: 7.1 G/DL (ref 11.5–15.4)
HGB BLDA-MCNC: 9.5 G/DL (ref 11.5–15.4)
IMM GRANULOCYTES # BLD AUTO: 0.02 THOUSAND/UL (ref 0–0.2)
IMM GRANULOCYTES NFR BLD AUTO: 0 % (ref 0–2)
LACTATE SERPL-SCNC: 2.3 MMOL/L (ref 0.5–2)
LYMPHOCYTES # BLD AUTO: 0.63 THOUSANDS/ÂΜL (ref 0.6–4.47)
LYMPHOCYTES NFR BLD AUTO: 8 % (ref 14–44)
MAGNESIUM SERPL-MCNC: 1.4 MG/DL (ref 1.9–2.7)
MCH RBC QN AUTO: 30.4 PG (ref 26.8–34.3)
MCHC RBC AUTO-ENTMCNC: 33.2 G/DL (ref 31.4–37.4)
MCV RBC AUTO: 92 FL (ref 82–98)
MONOCYTES # BLD AUTO: 0.24 THOUSAND/ÂΜL (ref 0.17–1.22)
MONOCYTES NFR BLD AUTO: 3 % (ref 4–12)
NEUTROPHILS # BLD AUTO: 7.25 THOUSANDS/ÂΜL (ref 1.85–7.62)
NEUTS SEG NFR BLD AUTO: 89 % (ref 43–75)
NRBC BLD AUTO-RTO: 0 /100 WBCS
O2 CT BLDA-SCNC: 12.3 ML/DL (ref 16–23)
OXYHGB MFR BLDA: 94.9 % (ref 94–97)
PCO2 BLD: 24 MMOL/L (ref 21–32)
PCO2 BLD: 26 MMOL/L (ref 21–32)
PCO2 BLD: 45.6 MM HG (ref 36–44)
PCO2 BLD: 64.5 MM HG (ref 36–44)
PCO2 BLDA: 44.4 MM HG (ref 36–44)
PH BLD: 7.14 [PH] (ref 7.35–7.45)
PH BLD: 7.34 [PH] (ref 7.35–7.45)
PH BLDA: 7.29 [PH] (ref 7.35–7.45)
PHOSPHATE SERPL-MCNC: 5.3 MG/DL (ref 2.3–4.1)
PLATELET # BLD AUTO: 143 THOUSANDS/UL (ref 149–390)
PMV BLD AUTO: 10.3 FL (ref 8.9–12.7)
PO2 BLD: 389 MM HG (ref 75–129)
PO2 BLD: >400 MM HG (ref 75–129)
PO2 BLDA: 87.5 MM HG (ref 75–129)
POTASSIUM BLD-SCNC: 4.1 MMOL/L (ref 3.5–5.3)
POTASSIUM BLD-SCNC: 4.2 MMOL/L (ref 3.5–5.3)
POTASSIUM SERPL-SCNC: 3.9 MMOL/L (ref 3.5–5.3)
RBC # BLD AUTO: 2.73 MILLION/UL (ref 3.81–5.12)
RH BLD: POSITIVE
SAO2 % BLD FROM PO2: 100 % (ref 60–85)
SODIUM BLD-SCNC: 140 MMOL/L (ref 136–145)
SODIUM BLD-SCNC: 141 MMOL/L (ref 136–145)
SODIUM SERPL-SCNC: 139 MMOL/L (ref 135–147)
SPECIMEN SOURCE: ABNORMAL
WBC # BLD AUTO: 8.17 THOUSAND/UL (ref 4.31–10.16)

## 2024-03-12 PROCEDURE — 80048 BASIC METABOLIC PNL TOTAL CA: CPT | Performed by: STUDENT IN AN ORGANIZED HEALTH CARE EDUCATION/TRAINING PROGRAM

## 2024-03-12 PROCEDURE — 82803 BLOOD GASES ANY COMBINATION: CPT

## 2024-03-12 PROCEDURE — 88309 TISSUE EXAM BY PATHOLOGIST: CPT | Performed by: PATHOLOGY

## 2024-03-12 PROCEDURE — NC001 PR NO CHARGE

## 2024-03-12 PROCEDURE — 0DTF0ZZ RESECTION OF RIGHT LARGE INTESTINE, OPEN APPROACH: ICD-10-PCS | Performed by: STUDENT IN AN ORGANIZED HEALTH CARE EDUCATION/TRAINING PROGRAM

## 2024-03-12 PROCEDURE — 82948 REAGENT STRIP/BLOOD GLUCOSE: CPT

## 2024-03-12 PROCEDURE — 84295 ASSAY OF SERUM SODIUM: CPT

## 2024-03-12 PROCEDURE — 83605 ASSAY OF LACTIC ACID: CPT | Performed by: STUDENT IN AN ORGANIZED HEALTH CARE EDUCATION/TRAINING PROGRAM

## 2024-03-12 PROCEDURE — 85025 COMPLETE CBC W/AUTO DIFF WBC: CPT | Performed by: STUDENT IN AN ORGANIZED HEALTH CARE EDUCATION/TRAINING PROGRAM

## 2024-03-12 PROCEDURE — 83735 ASSAY OF MAGNESIUM: CPT | Performed by: STUDENT IN AN ORGANIZED HEALTH CARE EDUCATION/TRAINING PROGRAM

## 2024-03-12 PROCEDURE — 82947 ASSAY GLUCOSE BLOOD QUANT: CPT

## 2024-03-12 PROCEDURE — 82805 BLOOD GASES W/O2 SATURATION: CPT | Performed by: STUDENT IN AN ORGANIZED HEALTH CARE EDUCATION/TRAINING PROGRAM

## 2024-03-12 PROCEDURE — 84132 ASSAY OF SERUM POTASSIUM: CPT

## 2024-03-12 PROCEDURE — 84100 ASSAY OF PHOSPHORUS: CPT | Performed by: STUDENT IN AN ORGANIZED HEALTH CARE EDUCATION/TRAINING PROGRAM

## 2024-03-12 PROCEDURE — NC001 PR NO CHARGE: Performed by: PHYSICIAN ASSISTANT

## 2024-03-12 PROCEDURE — 82330 ASSAY OF CALCIUM: CPT

## 2024-03-12 PROCEDURE — 85014 HEMATOCRIT: CPT

## 2024-03-12 RX ORDER — ONDANSETRON 2 MG/ML
4 INJECTION INTRAMUSCULAR; INTRAVENOUS ONCE AS NEEDED
Status: DISCONTINUED | OUTPATIENT
Start: 2024-03-12 | End: 2024-03-12 | Stop reason: HOSPADM

## 2024-03-12 RX ORDER — ROCURONIUM BROMIDE 10 MG/ML
INJECTION, SOLUTION INTRAVENOUS AS NEEDED
Status: DISCONTINUED | OUTPATIENT
Start: 2024-03-12 | End: 2024-03-12

## 2024-03-12 RX ORDER — HEPARIN SODIUM 5000 [USP'U]/ML
5000 INJECTION, SOLUTION INTRAVENOUS; SUBCUTANEOUS EVERY 8 HOURS SCHEDULED
Status: DISCONTINUED | OUTPATIENT
Start: 2024-03-12 | End: 2024-03-15

## 2024-03-12 RX ORDER — HYDROMORPHONE HCL/PF 1 MG/ML
SYRINGE (ML) INJECTION AS NEEDED
Status: DISCONTINUED | OUTPATIENT
Start: 2024-03-12 | End: 2024-03-12

## 2024-03-12 RX ORDER — LIDOCAINE HYDROCHLORIDE 10 MG/ML
INJECTION, SOLUTION EPIDURAL; INFILTRATION; INTRACAUDAL; PERINEURAL AS NEEDED
Status: DISCONTINUED | OUTPATIENT
Start: 2024-03-12 | End: 2024-03-12

## 2024-03-12 RX ORDER — ONDANSETRON 2 MG/ML
4 INJECTION INTRAMUSCULAR; INTRAVENOUS EVERY 6 HOURS PRN
Status: DISCONTINUED | OUTPATIENT
Start: 2024-03-12 | End: 2024-03-15 | Stop reason: HOSPADM

## 2024-03-12 RX ORDER — FENTANYL CITRATE 50 UG/ML
INJECTION, SOLUTION INTRAMUSCULAR; INTRAVENOUS AS NEEDED
Status: DISCONTINUED | OUTPATIENT
Start: 2024-03-12 | End: 2024-03-12

## 2024-03-12 RX ORDER — LABETALOL HYDROCHLORIDE 5 MG/ML
INJECTION, SOLUTION INTRAVENOUS AS NEEDED
Status: DISCONTINUED | OUTPATIENT
Start: 2024-03-12 | End: 2024-03-12

## 2024-03-12 RX ORDER — ACETAMINOPHEN 325 MG/1
975 TABLET ORAL ONCE
Status: COMPLETED | OUTPATIENT
Start: 2024-03-12 | End: 2024-03-12

## 2024-03-12 RX ORDER — METHOCARBAMOL 500 MG/1
500 TABLET, FILM COATED ORAL EVERY 6 HOURS SCHEDULED
Status: DISCONTINUED | OUTPATIENT
Start: 2024-03-12 | End: 2024-03-13

## 2024-03-12 RX ORDER — MAGNESIUM HYDROXIDE 1200 MG/15ML
LIQUID ORAL AS NEEDED
Status: DISCONTINUED | OUTPATIENT
Start: 2024-03-12 | End: 2024-03-12 | Stop reason: HOSPADM

## 2024-03-12 RX ORDER — MIDAZOLAM HYDROCHLORIDE 2 MG/2ML
INJECTION, SOLUTION INTRAMUSCULAR; INTRAVENOUS AS NEEDED
Status: DISCONTINUED | OUTPATIENT
Start: 2024-03-12 | End: 2024-03-12

## 2024-03-12 RX ORDER — HYDROMORPHONE HCL/PF 1 MG/ML
0.5 SYRINGE (ML) INJECTION
Status: DISCONTINUED | OUTPATIENT
Start: 2024-03-12 | End: 2024-03-12 | Stop reason: HOSPADM

## 2024-03-12 RX ORDER — ATORVASTATIN CALCIUM 40 MG/1
40 TABLET, FILM COATED ORAL
Status: DISCONTINUED | OUTPATIENT
Start: 2024-03-12 | End: 2024-03-15 | Stop reason: HOSPADM

## 2024-03-12 RX ORDER — GABAPENTIN 100 MG/1
100 CAPSULE ORAL 3 TIMES DAILY
Status: DISCONTINUED | OUTPATIENT
Start: 2024-03-12 | End: 2024-03-13

## 2024-03-12 RX ORDER — EPHEDRINE SULFATE 50 MG/ML
INJECTION INTRAVENOUS AS NEEDED
Status: DISCONTINUED | OUTPATIENT
Start: 2024-03-12 | End: 2024-03-12

## 2024-03-12 RX ORDER — INDOCYANINE GREEN AND WATER 25 MG
KIT INJECTION AS NEEDED
Status: DISCONTINUED | OUTPATIENT
Start: 2024-03-12 | End: 2024-03-12

## 2024-03-12 RX ORDER — FENTANYL CITRATE/PF 50 MCG/ML
25 SYRINGE (ML) INJECTION
Status: DISCONTINUED | OUTPATIENT
Start: 2024-03-12 | End: 2024-03-12 | Stop reason: HOSPADM

## 2024-03-12 RX ORDER — ONDANSETRON 2 MG/ML
INJECTION INTRAMUSCULAR; INTRAVENOUS AS NEEDED
Status: DISCONTINUED | OUTPATIENT
Start: 2024-03-12 | End: 2024-03-12

## 2024-03-12 RX ORDER — CALCIUM CHLORIDE 100 MG/ML
INJECTION INTRAVENOUS; INTRAVENTRICULAR AS NEEDED
Status: DISCONTINUED | OUTPATIENT
Start: 2024-03-12 | End: 2024-03-12

## 2024-03-12 RX ORDER — PROPOFOL 10 MG/ML
INJECTION, EMULSION INTRAVENOUS AS NEEDED
Status: DISCONTINUED | OUTPATIENT
Start: 2024-03-12 | End: 2024-03-12

## 2024-03-12 RX ORDER — MIRTAZAPINE 30 MG/1
30 TABLET, FILM COATED ORAL
Status: DISCONTINUED | OUTPATIENT
Start: 2024-03-12 | End: 2024-03-12

## 2024-03-12 RX ORDER — PHENYLEPHRINE HCL IN 0.9% NACL 1 MG/10 ML
SYRINGE (ML) INTRAVENOUS AS NEEDED
Status: DISCONTINUED | OUTPATIENT
Start: 2024-03-12 | End: 2024-03-12

## 2024-03-12 RX ORDER — METRONIDAZOLE 500 MG/100ML
500 INJECTION, SOLUTION INTRAVENOUS ONCE
Status: COMPLETED | OUTPATIENT
Start: 2024-03-12 | End: 2024-03-12

## 2024-03-12 RX ORDER — SODIUM CHLORIDE, SODIUM GLUCONATE, SODIUM ACETATE, POTASSIUM CHLORIDE, MAGNESIUM CHLORIDE, SODIUM PHOSPHATE, DIBASIC, AND POTASSIUM PHOSPHATE .53; .5; .37; .037; .03; .012; .00082 G/100ML; G/100ML; G/100ML; G/100ML; G/100ML; G/100ML; G/100ML
75 INJECTION, SOLUTION INTRAVENOUS CONTINUOUS
Status: DISCONTINUED | OUTPATIENT
Start: 2024-03-12 | End: 2024-03-15

## 2024-03-12 RX ORDER — CEFAZOLIN SODIUM 2 G/50ML
2000 SOLUTION INTRAVENOUS ONCE
Status: COMPLETED | OUTPATIENT
Start: 2024-03-12 | End: 2024-03-12

## 2024-03-12 RX ORDER — SODIUM CHLORIDE 9 MG/ML
INJECTION, SOLUTION INTRAVENOUS CONTINUOUS PRN
Status: DISCONTINUED | OUTPATIENT
Start: 2024-03-12 | End: 2024-03-12

## 2024-03-12 RX ORDER — SODIUM CHLORIDE, SODIUM LACTATE, POTASSIUM CHLORIDE, CALCIUM CHLORIDE 600; 310; 30; 20 MG/100ML; MG/100ML; MG/100ML; MG/100ML
INJECTION, SOLUTION INTRAVENOUS CONTINUOUS PRN
Status: DISCONTINUED | OUTPATIENT
Start: 2024-03-12 | End: 2024-03-12

## 2024-03-12 RX ORDER — ACETAMINOPHEN 325 MG/1
975 TABLET ORAL EVERY 8 HOURS SCHEDULED
Status: DISCONTINUED | OUTPATIENT
Start: 2024-03-12 | End: 2024-03-13

## 2024-03-12 RX ORDER — ALBUMIN, HUMAN INJ 5% 5 %
SOLUTION INTRAVENOUS CONTINUOUS PRN
Status: DISCONTINUED | OUTPATIENT
Start: 2024-03-12 | End: 2024-03-12

## 2024-03-12 RX ORDER — DEXAMETHASONE SODIUM PHOSPHATE 10 MG/ML
INJECTION, SOLUTION INTRAMUSCULAR; INTRAVENOUS AS NEEDED
Status: DISCONTINUED | OUTPATIENT
Start: 2024-03-12 | End: 2024-03-12

## 2024-03-12 RX ORDER — ESMOLOL HYDROCHLORIDE 10 MG/ML
INJECTION INTRAVENOUS AS NEEDED
Status: DISCONTINUED | OUTPATIENT
Start: 2024-03-12 | End: 2024-03-12

## 2024-03-12 RX ADMIN — INDOCYANINE GREEN AND WATER 7.5 MG: KIT at 16:58

## 2024-03-12 RX ADMIN — ATORVASTATIN CALCIUM 40 MG: 40 TABLET, FILM COATED ORAL at 22:31

## 2024-03-12 RX ADMIN — LABETALOL HYDROCHLORIDE 5 MG: 5 INJECTION, SOLUTION INTRAVENOUS at 15:43

## 2024-03-12 RX ADMIN — NICARDIPINE HYDROCHLORIDE 200 MCG: 2.5 INJECTION, SOLUTION INTRAVENOUS at 15:17

## 2024-03-12 RX ADMIN — CEFAZOLIN SODIUM 2000 MG: 2 SOLUTION INTRAVENOUS at 14:05

## 2024-03-12 RX ADMIN — LIDOCAINE HYDROCHLORIDE 50 MG: 10 INJECTION, SOLUTION EPIDURAL; INFILTRATION; INTRACAUDAL; PERINEURAL at 13:57

## 2024-03-12 RX ADMIN — DEXAMETHASONE SODIUM PHOSPHATE 10 MG: 10 INJECTION, SOLUTION INTRAMUSCULAR; INTRAVENOUS at 13:57

## 2024-03-12 RX ADMIN — HEPARIN SODIUM 5000 UNITS: 5000 INJECTION INTRAVENOUS; SUBCUTANEOUS at 22:30

## 2024-03-12 RX ADMIN — NICARDIPINE HYDROCHLORIDE 100 MCG: 2.5 INJECTION, SOLUTION INTRAVENOUS at 15:07

## 2024-03-12 RX ADMIN — FENTANYL CITRATE 50 MCG: 50 INJECTION INTRAMUSCULAR; INTRAVENOUS at 14:33

## 2024-03-12 RX ADMIN — CEFAZOLIN SODIUM 2000 MG: 2 SOLUTION INTRAVENOUS at 18:05

## 2024-03-12 RX ADMIN — MIDAZOLAM 2 MG: 1 INJECTION INTRAMUSCULAR; INTRAVENOUS at 13:53

## 2024-03-12 RX ADMIN — NICARDIPINE HYDROCHLORIDE 200 MCG: 2.5 INJECTION, SOLUTION INTRAVENOUS at 15:38

## 2024-03-12 RX ADMIN — ROCURONIUM BROMIDE 10 MG: 10 INJECTION, SOLUTION INTRAVENOUS at 16:06

## 2024-03-12 RX ADMIN — ROCURONIUM BROMIDE 20 MG: 10 INJECTION, SOLUTION INTRAVENOUS at 14:43

## 2024-03-12 RX ADMIN — ROCURONIUM BROMIDE 10 MG: 10 INJECTION, SOLUTION INTRAVENOUS at 15:18

## 2024-03-12 RX ADMIN — Medication 100 MCG: at 14:09

## 2024-03-12 RX ADMIN — PHENYLEPHRINE HYDROCHLORIDE 30 MCG/MIN: 10 INJECTION INTRAVENOUS at 14:09

## 2024-03-12 RX ADMIN — FENTANYL CITRATE 50 MCG: 50 INJECTION INTRAMUSCULAR; INTRAVENOUS at 13:57

## 2024-03-12 RX ADMIN — METHOCARBAMOL 500 MG: 500 TABLET ORAL at 23:37

## 2024-03-12 RX ADMIN — ESMOLOL HYDROCHLORIDE 30 MG: 100 INJECTION, SOLUTION INTRAVENOUS at 15:22

## 2024-03-12 RX ADMIN — ROCURONIUM BROMIDE 10 MG: 10 INJECTION, SOLUTION INTRAVENOUS at 19:06

## 2024-03-12 RX ADMIN — SODIUM CHLORIDE: 0.9 INJECTION, SOLUTION INTRAVENOUS at 15:18

## 2024-03-12 RX ADMIN — NICARDIPINE HYDROCHLORIDE 200 MCG: 2.5 INJECTION, SOLUTION INTRAVENOUS at 15:09

## 2024-03-12 RX ADMIN — PROPOFOL 40 MG: 10 INJECTION, EMULSION INTRAVENOUS at 14:58

## 2024-03-12 RX ADMIN — HYDROMORPHONE HYDROCHLORIDE 0.5 MG: 1 INJECTION, SOLUTION INTRAMUSCULAR; INTRAVENOUS; SUBCUTANEOUS at 14:45

## 2024-03-12 RX ADMIN — SODIUM CHLORIDE, SODIUM GLUCONATE, SODIUM ACETATE, POTASSIUM CHLORIDE, MAGNESIUM CHLORIDE, SODIUM PHOSPHATE, DIBASIC, AND POTASSIUM PHOSPHATE 125 ML/HR: .53; .5; .37; .037; .03; .012; .00082 INJECTION, SOLUTION INTRAVENOUS at 22:16

## 2024-03-12 RX ADMIN — SODIUM CHLORIDE, SODIUM LACTATE, POTASSIUM CHLORIDE, AND CALCIUM CHLORIDE: .6; .31; .03; .02 INJECTION, SOLUTION INTRAVENOUS at 19:17

## 2024-03-12 RX ADMIN — ONDANSETRON 4 MG: 2 INJECTION INTRAMUSCULAR; INTRAVENOUS at 19:33

## 2024-03-12 RX ADMIN — ALBUMIN (HUMAN): 12.5 INJECTION, SOLUTION INTRAVENOUS at 17:45

## 2024-03-12 RX ADMIN — Medication 200 MCG: at 14:12

## 2024-03-12 RX ADMIN — Medication 200 MCG: at 14:05

## 2024-03-12 RX ADMIN — ROCURONIUM BROMIDE 10 MG: 10 INJECTION, SOLUTION INTRAVENOUS at 15:38

## 2024-03-12 RX ADMIN — ALBUMIN (HUMAN): 12.5 INJECTION, SOLUTION INTRAVENOUS at 17:30

## 2024-03-12 RX ADMIN — SODIUM CHLORIDE, SODIUM LACTATE, POTASSIUM CHLORIDE, AND CALCIUM CHLORIDE 500 ML: .6; .31; .03; .02 INJECTION, SOLUTION INTRAVENOUS at 23:37

## 2024-03-12 RX ADMIN — ONDANSETRON 4 MG: 2 INJECTION INTRAMUSCULAR; INTRAVENOUS at 18:00

## 2024-03-12 RX ADMIN — ACETAMINOPHEN 975 MG: 325 TABLET, FILM COATED ORAL at 11:57

## 2024-03-12 RX ADMIN — EPHEDRINE SULFATE 10 MG: 50 INJECTION, SOLUTION INTRAVENOUS at 18:30

## 2024-03-12 RX ADMIN — EPHEDRINE SULFATE 5 MG: 50 INJECTION, SOLUTION INTRAVENOUS at 14:12

## 2024-03-12 RX ADMIN — HYDROMORPHONE HYDROCHLORIDE: 10 INJECTION, SOLUTION INTRAMUSCULAR; INTRAVENOUS; SUBCUTANEOUS at 20:45

## 2024-03-12 RX ADMIN — ROCURONIUM BROMIDE 10 MG: 10 INJECTION, SOLUTION INTRAVENOUS at 16:20

## 2024-03-12 RX ADMIN — EPHEDRINE SULFATE 5 MG: 50 INJECTION, SOLUTION INTRAVENOUS at 14:05

## 2024-03-12 RX ADMIN — NOREPINEPHRINE BITARTRATE 5 MCG/MIN: 1 INJECTION, SOLUTION, CONCENTRATE INTRAVENOUS at 19:15

## 2024-03-12 RX ADMIN — ALBUMIN (HUMAN): 12.5 INJECTION, SOLUTION INTRAVENOUS at 14:16

## 2024-03-12 RX ADMIN — SODIUM BICARBONATE 50 MEQ: 84 INJECTION, SOLUTION INTRAVENOUS at 19:02

## 2024-03-12 RX ADMIN — SODIUM CHLORIDE: 0.9 INJECTION, SOLUTION INTRAVENOUS at 14:01

## 2024-03-12 RX ADMIN — FENTANYL CITRATE 50 MCG: 50 INJECTION INTRAMUSCULAR; INTRAVENOUS at 15:38

## 2024-03-12 RX ADMIN — ROCURONIUM BROMIDE 50 MG: 10 INJECTION, SOLUTION INTRAVENOUS at 13:58

## 2024-03-12 RX ADMIN — NICARDIPINE HYDROCHLORIDE 300 MCG: 2.5 INJECTION, SOLUTION INTRAVENOUS at 15:14

## 2024-03-12 RX ADMIN — PROPOFOL 160 MG: 10 INJECTION, EMULSION INTRAVENOUS at 13:57

## 2024-03-12 RX ADMIN — EPHEDRINE SULFATE 10 MG: 50 INJECTION, SOLUTION INTRAVENOUS at 19:10

## 2024-03-12 RX ADMIN — CALCIUM CHLORIDE 1 G: 100 INJECTION INTRAVENOUS; INTRAVENTRICULAR at 19:10

## 2024-03-12 RX ADMIN — SUGAMMADEX 200 MG: 100 INJECTION, SOLUTION INTRAVENOUS at 19:35

## 2024-03-12 RX ADMIN — LABETALOL HYDROCHLORIDE 5 MG: 5 INJECTION, SOLUTION INTRAVENOUS at 16:40

## 2024-03-12 RX ADMIN — METRONIDAZOLE: 500 INJECTION, SOLUTION INTRAVENOUS at 14:04

## 2024-03-12 RX ADMIN — CALCIUM CHLORIDE 1 G: 100 INJECTION INTRAVENOUS; INTRAVENTRICULAR at 18:26

## 2024-03-12 RX ADMIN — SODIUM CHLORIDE, SODIUM LACTATE, POTASSIUM CHLORIDE, AND CALCIUM CHLORIDE: .6; .31; .03; .02 INJECTION, SOLUTION INTRAVENOUS at 13:53

## 2024-03-12 RX ADMIN — LABETALOL HYDROCHLORIDE 5 MG: 5 INJECTION, SOLUTION INTRAVENOUS at 16:20

## 2024-03-12 RX ADMIN — GABAPENTIN 100 MG: 100 CAPSULE ORAL at 22:33

## 2024-03-12 RX ADMIN — Medication 200 MCG: at 14:37

## 2024-03-12 RX ADMIN — FENTANYL CITRATE 50 MCG: 50 INJECTION INTRAMUSCULAR; INTRAVENOUS at 15:15

## 2024-03-12 RX ADMIN — LABETALOL HYDROCHLORIDE 5 MG: 5 INJECTION, SOLUTION INTRAVENOUS at 15:57

## 2024-03-12 NOTE — INTERVAL H&P NOTE
H&P reviewed. After examining the patient I find no changes in the patients condition since the H&P had been written.    Vitals:    03/12/24 1145   BP: 125/76   Pulse: (!) 112   Temp: 98 °F (36.7 °C)   SpO2: 98%

## 2024-03-12 NOTE — ANESTHESIA PROCEDURE NOTES
Arterial Line Insertion    Performed by: Rafaela Stark CRNA  Authorized by: Jose Kahn MD  Consent: Verbal consent obtained. Written consent obtained.  Risks and benefits: risks, benefits and alternatives were discussed  Consent given by: patient  Patient understanding: patient states understanding of the procedure being performed  Patient consent: the patient's understanding of the procedure matches consent given  Procedure consent: procedure consent matches procedure scheduled  Relevant documents: relevant documents present and verified  Test results: test results available and properly labeled  Site marked: the operative site was marked  Radiology Images: Radiology Images displayed and confirmed. If images not available, report reviewed  Required items: required blood products, implants, devices, and special equipment available  Patient identity confirmed: arm band  Preparation: Patient was prepped and draped in the usual sterile fashion.  Indications: hemodynamic monitoring  Orientation:  Right  Location: radial artery  Sedation:  Patient sedated: GETA.    Procedure Details:  Jimbo's test normal: yes  Needle gauge: 20  Number of attempts: 1    Post-procedure:  Post-procedure: dressing applied  Waveform: good waveform  Post-procedure CNS: unchanged  Patient tolerance: Patient tolerated the procedure well with no immediate complications  Comments: Ultrasound guided

## 2024-03-13 ENCOUNTER — APPOINTMENT (INPATIENT)
Dept: RADIOLOGY | Facility: HOSPITAL | Age: 69
DRG: 330 | End: 2024-03-13
Payer: MEDICARE

## 2024-03-13 LAB
ALBUMIN SERPL BCP-MCNC: 3.3 G/DL (ref 3.5–5)
ALP SERPL-CCNC: 68 U/L (ref 34–104)
ALT SERPL W P-5'-P-CCNC: 27 U/L (ref 7–52)
ANION GAP SERPL CALCULATED.3IONS-SCNC: 6 MMOL/L (ref 4–13)
AST SERPL W P-5'-P-CCNC: 47 U/L (ref 13–39)
BASE EX.OXY STD BLDV CALC-SCNC: 85.4 % (ref 60–80)
BASE EXCESS BLDV CALC-SCNC: -4.9 MMOL/L
BASOPHILS # BLD AUTO: 0.01 THOUSANDS/ÂΜL (ref 0–0.1)
BASOPHILS NFR BLD AUTO: 0 % (ref 0–1)
BILIRUB DIRECT SERPL-MCNC: 0.2 MG/DL (ref 0–0.2)
BILIRUB SERPL-MCNC: 0.58 MG/DL (ref 0.2–1)
BUN SERPL-MCNC: 9 MG/DL (ref 5–25)
CALCIUM SERPL-MCNC: 8.5 MG/DL (ref 8.4–10.2)
CHLORIDE SERPL-SCNC: 108 MMOL/L (ref 96–108)
CO2 SERPL-SCNC: 25 MMOL/L (ref 21–32)
CREAT SERPL-MCNC: 0.9 MG/DL (ref 0.6–1.3)
EOSINOPHIL # BLD AUTO: 0 THOUSAND/ÂΜL (ref 0–0.61)
EOSINOPHIL NFR BLD AUTO: 0 % (ref 0–6)
ERYTHROCYTE [DISTWIDTH] IN BLOOD BY AUTOMATED COUNT: 16.4 % (ref 11.6–15.1)
GFR SERPL CREATININE-BSD FRML MDRD: 65 ML/MIN/1.73SQ M
GLUCOSE SERPL-MCNC: 141 MG/DL (ref 65–140)
HCO3 BLDV-SCNC: 21 MMOL/L (ref 24–30)
HCT VFR BLD AUTO: 24.9 % (ref 34.8–46.1)
HCT VFR BLD AUTO: 29.6 % (ref 34.8–46.1)
HGB BLD-MCNC: 8.2 G/DL (ref 11.5–15.4)
HGB BLD-MCNC: 9.4 G/DL (ref 11.5–15.4)
IMM GRANULOCYTES # BLD AUTO: 0.05 THOUSAND/UL (ref 0–0.2)
IMM GRANULOCYTES NFR BLD AUTO: 0 % (ref 0–2)
LACTATE SERPL-SCNC: 1.9 MMOL/L (ref 0.5–2)
LACTATE SERPL-SCNC: 3.7 MMOL/L (ref 0.5–2)
LACTATE SERPL-SCNC: 4.5 MMOL/L (ref 0.5–2)
LYMPHOCYTES # BLD AUTO: 0.68 THOUSANDS/ÂΜL (ref 0.6–4.47)
LYMPHOCYTES NFR BLD AUTO: 6 % (ref 14–44)
MAGNESIUM SERPL-MCNC: 1.3 MG/DL (ref 1.9–2.7)
MCH RBC QN AUTO: 29.9 PG (ref 26.8–34.3)
MCHC RBC AUTO-ENTMCNC: 32.9 G/DL (ref 31.4–37.4)
MCV RBC AUTO: 91 FL (ref 82–98)
MONOCYTES # BLD AUTO: 0.61 THOUSAND/ÂΜL (ref 0.17–1.22)
MONOCYTES NFR BLD AUTO: 5 % (ref 4–12)
NASAL CANNULA: 3
NEUTROPHILS # BLD AUTO: 10.03 THOUSANDS/ÂΜL (ref 1.85–7.62)
NEUTS SEG NFR BLD AUTO: 89 % (ref 43–75)
NRBC BLD AUTO-RTO: 0 /100 WBCS
O2 CT BLDV-SCNC: 11.3 ML/DL
PCO2 BLDV: 42.1 MM HG (ref 42–50)
PH BLDV: 7.32 [PH] (ref 7.3–7.4)
PHOSPHATE SERPL-MCNC: 3.1 MG/DL (ref 2.3–4.1)
PLATELET # BLD AUTO: 127 THOUSANDS/UL (ref 149–390)
PMV BLD AUTO: 10.9 FL (ref 8.9–12.7)
PO2 BLDV: 58.7 MM HG (ref 35–45)
POTASSIUM SERPL-SCNC: 3.9 MMOL/L (ref 3.5–5.3)
PROT SERPL-MCNC: 6.2 G/DL (ref 6.4–8.4)
RBC # BLD AUTO: 2.74 MILLION/UL (ref 3.81–5.12)
SODIUM SERPL-SCNC: 139 MMOL/L (ref 135–147)
WBC # BLD AUTO: 11.38 THOUSAND/UL (ref 4.31–10.16)

## 2024-03-13 PROCEDURE — 80076 HEPATIC FUNCTION PANEL: CPT | Performed by: PHYSICIAN ASSISTANT

## 2024-03-13 PROCEDURE — 80048 BASIC METABOLIC PNL TOTAL CA: CPT | Performed by: STUDENT IN AN ORGANIZED HEALTH CARE EDUCATION/TRAINING PROGRAM

## 2024-03-13 PROCEDURE — 82805 BLOOD GASES W/O2 SATURATION: CPT | Performed by: STUDENT IN AN ORGANIZED HEALTH CARE EDUCATION/TRAINING PROGRAM

## 2024-03-13 PROCEDURE — 84100 ASSAY OF PHOSPHORUS: CPT | Performed by: STUDENT IN AN ORGANIZED HEALTH CARE EDUCATION/TRAINING PROGRAM

## 2024-03-13 PROCEDURE — 97163 PT EVAL HIGH COMPLEX 45 MIN: CPT

## 2024-03-13 PROCEDURE — 85014 HEMATOCRIT: CPT

## 2024-03-13 PROCEDURE — 85018 HEMOGLOBIN: CPT

## 2024-03-13 PROCEDURE — 99024 POSTOP FOLLOW-UP VISIT: CPT | Performed by: STUDENT IN AN ORGANIZED HEALTH CARE EDUCATION/TRAINING PROGRAM

## 2024-03-13 PROCEDURE — 70450 CT HEAD/BRAIN W/O DYE: CPT

## 2024-03-13 PROCEDURE — 85025 COMPLETE CBC W/AUTO DIFF WBC: CPT | Performed by: STUDENT IN AN ORGANIZED HEALTH CARE EDUCATION/TRAINING PROGRAM

## 2024-03-13 PROCEDURE — 83735 ASSAY OF MAGNESIUM: CPT | Performed by: STUDENT IN AN ORGANIZED HEALTH CARE EDUCATION/TRAINING PROGRAM

## 2024-03-13 PROCEDURE — 99223 1ST HOSP IP/OBS HIGH 75: CPT | Performed by: INTERNAL MEDICINE

## 2024-03-13 PROCEDURE — 83605 ASSAY OF LACTIC ACID: CPT

## 2024-03-13 PROCEDURE — 97167 OT EVAL HIGH COMPLEX 60 MIN: CPT

## 2024-03-13 RX ORDER — HYDROMORPHONE HCL/PF 1 MG/ML
0.5 SYRINGE (ML) INJECTION
Status: DISCONTINUED | OUTPATIENT
Start: 2024-03-13 | End: 2024-03-15 | Stop reason: HOSPADM

## 2024-03-13 RX ORDER — CELECOXIB 200 MG/1
200 CAPSULE ORAL DAILY
Status: DISCONTINUED | OUTPATIENT
Start: 2024-03-13 | End: 2024-03-14

## 2024-03-13 RX ORDER — POTASSIUM CHLORIDE 20 MEQ/1
20 TABLET, EXTENDED RELEASE ORAL ONCE
Status: COMPLETED | OUTPATIENT
Start: 2024-03-13 | End: 2024-03-13

## 2024-03-13 RX ORDER — ACETAMINOPHEN 325 MG/1
975 TABLET ORAL EVERY 6 HOURS SCHEDULED
Status: DISCONTINUED | OUTPATIENT
Start: 2024-03-13 | End: 2024-03-15 | Stop reason: HOSPADM

## 2024-03-13 RX ORDER — GABAPENTIN 300 MG/1
300 CAPSULE ORAL 3 TIMES DAILY
Status: DISCONTINUED | OUTPATIENT
Start: 2024-03-13 | End: 2024-03-15 | Stop reason: HOSPADM

## 2024-03-13 RX ORDER — MAGNESIUM SULFATE HEPTAHYDRATE 40 MG/ML
4 INJECTION, SOLUTION INTRAVENOUS ONCE
Status: COMPLETED | OUTPATIENT
Start: 2024-03-13 | End: 2024-03-13

## 2024-03-13 RX ORDER — DOCUSATE SODIUM 100 MG/1
100 CAPSULE, LIQUID FILLED ORAL 2 TIMES DAILY
Status: DISCONTINUED | OUTPATIENT
Start: 2024-03-13 | End: 2024-03-15 | Stop reason: HOSPADM

## 2024-03-13 RX ORDER — OXYCODONE HYDROCHLORIDE 5 MG/1
5 TABLET ORAL EVERY 4 HOURS PRN
Status: DISCONTINUED | OUTPATIENT
Start: 2024-03-13 | End: 2024-03-15 | Stop reason: HOSPADM

## 2024-03-13 RX ORDER — ALBUMIN, HUMAN INJ 5% 5 %
12.5 SOLUTION INTRAVENOUS ONCE
Status: COMPLETED | OUTPATIENT
Start: 2024-03-13 | End: 2024-03-13

## 2024-03-13 RX ORDER — MIDODRINE HYDROCHLORIDE 5 MG/1
5 TABLET ORAL
Status: DISCONTINUED | OUTPATIENT
Start: 2024-03-13 | End: 2024-03-15

## 2024-03-13 RX ADMIN — SODIUM CHLORIDE, SODIUM LACTATE, POTASSIUM CHLORIDE, AND CALCIUM CHLORIDE 1000 ML: .6; .31; .03; .02 INJECTION, SOLUTION INTRAVENOUS at 02:46

## 2024-03-13 RX ADMIN — ALBUMIN (HUMAN) 12.5 G: 12.5 INJECTION, SOLUTION INTRAVENOUS at 04:14

## 2024-03-13 RX ADMIN — DOCUSATE SODIUM 100 MG: 100 CAPSULE, LIQUID FILLED ORAL at 15:38

## 2024-03-13 RX ADMIN — ACETAMINOPHEN 975 MG: 325 TABLET, FILM COATED ORAL at 05:53

## 2024-03-13 RX ADMIN — CELECOXIB 200 MG: 200 CAPSULE ORAL at 15:38

## 2024-03-13 RX ADMIN — GABAPENTIN 100 MG: 100 CAPSULE ORAL at 08:05

## 2024-03-13 RX ADMIN — MIDODRINE HYDROCHLORIDE 5 MG: 5 TABLET ORAL at 12:28

## 2024-03-13 RX ADMIN — METHOCARBAMOL 500 MG: 500 TABLET ORAL at 11:13

## 2024-03-13 RX ADMIN — GABAPENTIN 300 MG: 300 CAPSULE ORAL at 15:38

## 2024-03-13 RX ADMIN — HEPARIN SODIUM 5000 UNITS: 5000 INJECTION INTRAVENOUS; SUBCUTANEOUS at 15:38

## 2024-03-13 RX ADMIN — MAGNESIUM SULFATE HEPTAHYDRATE 4 G: 40 INJECTION, SOLUTION INTRAVENOUS at 09:41

## 2024-03-13 RX ADMIN — SODIUM CHLORIDE, SODIUM GLUCONATE, SODIUM ACETATE, POTASSIUM CHLORIDE, MAGNESIUM CHLORIDE, SODIUM PHOSPHATE, DIBASIC, AND POTASSIUM PHOSPHATE 125 ML/HR: .53; .5; .37; .037; .03; .012; .00082 INJECTION, SOLUTION INTRAVENOUS at 13:56

## 2024-03-13 RX ADMIN — POTASSIUM CHLORIDE 20 MEQ: 1500 TABLET, EXTENDED RELEASE ORAL at 09:40

## 2024-03-13 RX ADMIN — ATORVASTATIN CALCIUM 40 MG: 40 TABLET, FILM COATED ORAL at 21:31

## 2024-03-13 RX ADMIN — SODIUM CHLORIDE, SODIUM LACTATE, POTASSIUM CHLORIDE, AND CALCIUM CHLORIDE 500 ML: .6; .31; .03; .02 INJECTION, SOLUTION INTRAVENOUS at 00:26

## 2024-03-13 RX ADMIN — METHOCARBAMOL 500 MG: 500 TABLET ORAL at 05:53

## 2024-03-13 RX ADMIN — HEPARIN SODIUM 5000 UNITS: 5000 INJECTION INTRAVENOUS; SUBCUTANEOUS at 21:30

## 2024-03-13 RX ADMIN — ACETAMINOPHEN 975 MG: 325 TABLET, FILM COATED ORAL at 17:37

## 2024-03-13 RX ADMIN — MIDODRINE HYDROCHLORIDE 5 MG: 5 TABLET ORAL at 15:39

## 2024-03-13 RX ADMIN — HEPARIN SODIUM 5000 UNITS: 5000 INJECTION INTRAVENOUS; SUBCUTANEOUS at 05:52

## 2024-03-13 RX ADMIN — SODIUM CHLORIDE, SODIUM GLUCONATE, SODIUM ACETATE, POTASSIUM CHLORIDE, MAGNESIUM CHLORIDE, SODIUM PHOSPHATE, DIBASIC, AND POTASSIUM PHOSPHATE 125 ML/HR: .53; .5; .37; .037; .03; .012; .00082 INJECTION, SOLUTION INTRAVENOUS at 05:56

## 2024-03-13 RX ADMIN — GABAPENTIN 300 MG: 300 CAPSULE ORAL at 21:31

## 2024-03-13 RX ADMIN — SODIUM CHLORIDE, SODIUM GLUCONATE, SODIUM ACETATE, POTASSIUM CHLORIDE, MAGNESIUM CHLORIDE, SODIUM PHOSPHATE, DIBASIC, AND POTASSIUM PHOSPHATE 125 ML/HR: .53; .5; .37; .037; .03; .012; .00082 INJECTION, SOLUTION INTRAVENOUS at 21:43

## 2024-03-13 NOTE — PLAN OF CARE
Problem: PAIN - ADULT  Goal: Verbalizes/displays adequate comfort level or baseline comfort level  Description: Interventions:  - Encourage patient to monitor pain and request assistance  - Assess pain using appropriate pain scale  - Administer analgesics based on type and severity of pain and evaluate response  - Implement non-pharmacological measures as appropriate and evaluate response  - Consider cultural and social influences on pain and pain management  - Notify physician/advanced practitioner if interventions unsuccessful or patient reports new pain  Outcome: Progressing     Problem: INFECTION - ADULT  Goal: Absence or prevention of progression during hospitalization  Description: INTERVENTIONS:  - Assess and monitor for signs and symptoms of infection  - Monitor lab/diagnostic results  - Monitor all insertion sites, i.e. indwelling lines, tubes, and drains  - Monitor endotracheal if appropriate and nasal secretions for changes in amount and color  - [place] appropriate cooling/warming therapies per order  - Administer medications as ordered  - Instruct and encourage patient and family to use good hand hygiene technique  - Identify and instruct in appropriate isolation precautions for identified infection/condition  Outcome: Progressing     Problem: SAFETY ADULT  Goal: Patient will remain free of falls  Description: INTERVENTIONS:  - Educate patient/family on patient safety including physical limitations  - Instruct patient to call for assistance with activity   - Consult OT/PT to assist with strengthening/mobility   - Keep Call bell within reach  - Keep bed low and locked with side rails adjusted as appropriate  - Keep care items and personal belongings within reach  - Initiate and maintain comfort rounds  - Make Fall Risk Sign visible to staff  - Offer Toileting every 2 Hours, in advance of need  - Initiate/Maintain bed alarm  - Apply yellow socks and bracelet for high fall risk patients  - Consider  moving patient to room near nurses station  Outcome: Progressing

## 2024-03-13 NOTE — PROGRESS NOTES
"Progress Note - Surgical Oncology   Maira Moura 69 y.o. female MRN: [id number]  Unit/Bed#: [facility] 901-01 Encounter: [id number]    Assessment:  61-year-old female who underwent robotic right hemicolectomy for metastatic colon cancer.    Plan:  -Continue clears  -Continue Isolyte at 125  -Continue PCA D  -maintain Pope  -Follow-up a.m. labs  -Wean supplemental O2  -Follow-up nephro cs  -DVT prophylaxis  -Encourage out of bed ambulation  -Encourage I-S use    Subjective/Objective     Subjective: Overnight patient rather somnolent but responsive to voice answers questions appropriately became more alert throughout the night.  Denies any nausea or vomiting this morning did not have much p.o. intake.  Reports her pain is a 7 out of 10    Objective: Afebrile, tach into the 110s, hemodynamically normal on room air    Blood pressure 162/100, pulse (!) 109, temperature 97.5 °F (36.4 °C), resp. rate 19, height 5' 5\" (1.651 m), weight 97.5 kg (215 lb), SpO2 100%, not currently breastfeeding.,Body mass index is 35.78 kg/m².    UOP: 1.6 L (850 cc overnight)    Invasive Devices       Central Venous Catheter Line  Duration             Port A Cath 07/28/23 Right Internal jugular 228 days              Peripheral Intravenous Line  Duration             Peripheral IV 03/12/24 Left Antecubital <1 day    Peripheral IV 03/12/24 Left Hand <1 day              Drain  Duration             Urethral Catheter Double-lumen;Latex 16 Fr. <1 day                    Physical Exam:  General: No acute distress, somnolent but responsive to voice follows commands answers questions appropriately  CV: RRR  Lungs: Normal work of breathing on 3 L nasal cannula satting appropriately  Abdomen: Cool soft, postsurgically tender nondistended, no signs of peritonitis such as rigidity or guarding. Incision/s clean dry and intact with Exofin  Skin: Warm, dry    Lab, Imaging and other studies:  Recent Labs     03/12/24  1926 03/12/24 2033 03/13/24  0220   WBC  -- "  8.17  --    HGB 7.1* 8.3* 9.4*   PLT  --  143*  --    SODIUM  --  139  --    K  --  3.9  --    CL  --  108  --    CO2 26 22  --    BUN  --  9  --    CREATININE  --  1.03  --    GLUC  --  205*  --    CALCIUM  --  9.7  --    MG  --  1.4*  --    PHOS  --  5.3*  --      VTE Pharmacologic Prophylaxis: Heparin  VTE Mechanical Prophylaxis: sequential compression device

## 2024-03-13 NOTE — PHYSICAL THERAPY NOTE
Physical Therapy Evaluation     Patient's Name: Maira Moura    Admitting Diagnosis  Metastatic colon cancer to liver (HCC) [C18.9, C78.7]    Problem List  Patient Active Problem List   Diagnosis    Primary hypertension    Mixed hyperlipidemia    Malignant neoplasm of right female breast (HCC)    Vitamin D deficiency    Prediabetes    Right thyroid nodule    GERD (gastroesophageal reflux disease)    Obesity    Metastatic colon cancer to liver (HCC)    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HCC)    Poor appetite    Anemia    Dehydration    Drug-induced constipation    Chemotherapy induced neutropenia     Stage 3 chronic kidney disease, unspecified whether stage 3a or 3b CKD (HCC)    Platelets decreased (HCC)    Neuropathy    Diastolic dysfunction       Past Medical History  Past Medical History:   Diagnosis Date    Anemia     Arthritis     Body mass index (BMI) 40.0-44.9, adult (HCC)     Breast cancer (HCC) 12/17/2018    Cancer (HCC)     right breast, colon, liver, right tonsil    Cervical lymphadenopathy     CT neck on 3/30/2023- Large right level 2A lymphadenopathy as described above and suspicious for neoplasm.  Correlation with histopathology is recommended.  Mild asymmetric prominence of the right palatine tonsil with otherwise no definitive nodular enhancing lesions identified along the course of the aerodigestive tract.     5/26/23- FNA of this node was nonrevealing for tissue etiology, but it d    Encounter for screening for HIV 07/07/2022    Follow-up examination 04/04/2023    GERD (gastroesophageal reflux disease)     Hyperlipidemia     Hypertension     Obesity     Stroke (HCC)     TIA     Stroke (HCC)     TIA     Transaminitis 09/22/2021    Vasomotor rhinitis     Refilled flonase today. Stopped taking since January 2022       Past Surgical History  Past Surgical History:   Procedure Laterality Date    BREAST BIOPSY Right 11/23/2018    us guided bx cancer    BREAST SURGERY      COLONOSCOPY       ESOPHAGOSCOPY N/A 07/20/2023    Procedure: ESOPHAGOSCOPY;  Surgeon: David Chapa MD;  [facility];  Service: ENT    HEMICOLOECTOMY W/ ANASTOMOSIS Right 3/12/2024    Procedure: RIGHT COLECTOMY WITH ROBOTICS;  Surgeon: Vickie Israel MD;  [facility];  Service: Surgical Oncology    IR BIOPSY LIVER MASS  06/27/2023    IR PORT CHECK  01/03/2024    IR PORT PLACEMENT  07/28/2023    IR PORT STRIPPING  01/09/2024    LAPAROTOMY N/A 3/12/2024    Procedure: LAPAROTOMY EXPLORATORY W/ ROBOTICS;  Surgeon: Vickie Israel MD;  [facility];  Service: Surgical Oncology    [facility]/CELL WASHG SPX N/A 07/20/2023    Procedure: BRONCHOSCOPY;  Surgeon: David Chapa MD;  [facility];  Service: ENT    CA LARYNGOSCOPY W/BIOPSY MICROSCOPE/TELESCOPE N/A 07/20/2023    Procedure: MICRODIRECT LARYNGOSCOPY WITH BIOPSY;  Surgeon: David Chapa MD;  [facility];  Service: ENT    CA MASTECTOMY PARTIAL W/AXILLARY LYMPHADENECTOMY Right 12/20/2018    Procedure: ULTRASOUND LOCALIZED PARTIAL MASTECTOMY W/SENTINEL NODE BIOPSY POSS AXILLARY DISSECTION;  Surgeon: Zahida Coronado MD;  Location: [facility];  Service: General    TUBAL LIGATION      US GUIDED BREAST BIOPSY RIGHT COMPLETE Right 11/23/2018    US GUIDED INJECTION FOR RESEARCH STUDY  12/20/2018    US GUIDED INJECTION FOR RESEARCH STUDY  12/07/2018    US GUIDED INJECTION FOR RESEARCH STUDY  05/03/2019    US GUIDED LYMPH NODE BIOPSY RIGHT  05/26/2023 03/13/24 1025   PT Last Visit   PT Visit Date 03/13/24   Note Type   Note type Evaluation   Pain Assessment   Pain Assessment Tool 0-10   Pain Score No Pain  (@ rest)   Pain Location/Orientation Location: Abdomen   Hospital Pain Intervention(s) Repositioned;Ambulation/increased activity;Emotional support   Restrictions/Precautions   Weight Bearing Precautions Per Order No   Other Precautions Cognitive;Chair Alarm;Bed Alarm;Multiple lines;O2;Fall  Risk;Pain  (epidural, arteaga, 2L O2)   Home Living   Type of Home Apartment   Home Layout One level;Performs ADLs on one level;Able to live on main level with bedroom/bathroom  (3rd floor apartment; 3 full flights to enter 2* broken elevator)   Bathroom Shower/Tub Tub/shower unit   Bathroom Toilet Standard   Bathroom Equipment Shower chair   Home Equipment Cane  (did not use at baseline)   Prior Function   Level of [place] Independent with ADLs;Independent with functional mobility;Needs assistance with IADLS   Lives With Son;Daughter   Receives Help From Family   IADLs Family/Friend/Other provides transportation;Family/Friend/Other provides meals;Family/Friend/Other provides medication management   Falls in the last 6 months 1 to 4  (less than 5, could not give #)   General   Family/Caregiver Present Yes  (daughter)   Cognition   Arousal/Participation Lethargic   Memory Decreased recall of precautions   Following Commands Follows one step commands with increased time or repetition   Comments pt pleasant and cooperative. Pt with increased lethargy, can be self-limiting. Increased time required to complete all takss.   RLE Assessment   RLE Assessment   (functionally 3+/5)   LLE Assessment   LLE Assessment   (functionally 3+/5)   Bed Mobility   Supine to Sit 3  Moderate assistance   Additional items Assist x 1;HOB elevated;Bedrails;Increased time required;Verbal cues;LE management   Sit to Supine Unable to assess   Additional Comments pt found supine in bed upon arrival. pt left sitting OOB in recliner with all needs wihtin reach   Transfers   Sit to Stand 3  Moderate assistance   Additional items Assist x 2;Increased time required;Verbal cues   Stand to Sit 3  Moderate assistance   Additional items Assist x 2;Increased time required;Verbal cues   Additional Comments transfers with RW   Ambulation/Elevation   Gait pattern Excessively slow;Short stride;Foward flexed;Decreased foot clearance;Improper Weight shift    Gait Assistance 3  Moderate assist   Additional items Assist x 2;Verbal cues;Tactile cues   Assistive Device Rolling walker   Distance 4' from EOB > recliner   Balance   Static Sitting Fair   Dynamic Sitting Fair -   Static Standing Poor   Dynamic Standing Poor   Ambulatory Poor  (RW)   Endurance Deficit   Endurance Deficit Yes   Endurance Deficit Description decreased exercise tolerance, pain, generalized weakness   Activity Tolerance   Activity Tolerance Patient limited by fatigue;Patient limited by pain   Medical Staff Made Aware MIRYAM Moyer; co-session completed this date 2* increased medical complexity and multiple co-morbidities   Nurse Made Aware RN cleared pt to participate in therapy session   Assessment   Prognosis Good   Problem List Decreased strength;Decreased endurance;Impaired balance;Decreased mobility;Pain   Assessment Pt is a 69 y.o. female seen for PT evaluation s/p admit to [facility] on [facility]12[facility]. Pt was admitted with a primary dx of: metastatic colon cancer to liver s/p R hemicolectomy 3/12.  PT now consulted for assessment of mobility and d/c needs. Pt with Ambulate orders. Pt  has a past medical history of Anemia, Arthritis, Body mass index (BMI) 40.0-44.9, adult (HCC), Breast cancer (HCC) (12/17/2018), Cancer (HCC), Cervical lymphadenopathy, Encounter for screening for HIV (07/07/2022), Follow-up examination (04/04/2023), GERD (gastroesophageal reflux disease), Hyperlipidemia, Hypertension, Obesity, Stroke (HCC), Stroke (HCC), Transaminitis (09/22/2021), and Vasomotor rhinitis. Pts current clinical presentation is Unstable/ Unpredictable (high complexity) due to Ongoing medical management for primary dx, Increased reliance on more restrictive AD compared to baseline, Decreased activity tolerance compared to baseline, Fall risk, Increased assistance needed from caregiver at current time, Cog status, Increased O2 via NC from pts baseline, Trending lab values, Continuous pulse  oximetry monitoring , s/p surgical intervention. Prior to admission, pt was residing with family in 3rd floor apartment with 3 flights of steps to to enter 2* elevator being broken and was typically independent without AD use. Upon evaluation, pt currently is requiring mod A for bed mobility; mod Ax2 for transfers; mod Ax2 for ambulation 4 ft w/ RW. Pt presents at PT eval functioning below baseline and currently w/ overall mobility deficits 2* to: BLE weakness, decreased ROM, impaired balance, decreased endurance, gait deviations, pain, decreased activity tolerance compared to baseline, decreased functional mobility tolerance compared to baseline, fall risk. Pt currently at a fall risk 2* to impairments listed above.  Pt will continue to benefit from skilled acute PT interventions to address stated impairments; to maximize functional mobility; for ongoing pt/ family training; and DME needs. At conclusion of PT session pt returned back in chair and chair alarm engaged with phone and call bell within reach. Pt denies any further questions at this time. The patient's AM-PAC Basic Mobility Inpatient Short Form Raw Score is 8. A Raw score of less than or equal to 16 suggests the patient may benefit from discharge to post-acute rehabilitation services. Please also refer to the recommendation of the Physical Therapist for safe discharge planning. Recommend level II resource intensity (rehab pending progress) upon hospital D/C.   Barriers to Discharge Inaccessible home environment   Goals   Patient Goals to rest   STG Expiration Date 03/27/24   Short Term Goal #1 STG 1. Pt will be able to perform bed mobility tasks with mod I level in order to improve overall functional mobility and assist in safe d/c. STG 2. Pt with sit EOB for at least 25 minutes at mod I level in order to strengthen abdominal musculature and assist in future transfers/ ambulation. STG 3. Pt will be able to perform functional transfer with mod I level in  order to improve overall functional mobility and assist in safe d/c. STG 4. Pt will be able to ambulate at least 250 feet with least restrictive device with mod I level A in order to improve overall functional mobility and assist in safe d/c. STG 5. Pt will improve sitting/standing static/dynamic balance 1/2 grade in order to improve functional mobility and assist in safe d/c. STG 6. Pt will improve LE strength by 1/2 grade in order to improve functional mobility and assist in safe d/c. STG 7. Pt will be able to negotiate at least 24 stairs with least restrictive device with S level A in order to improve overall functional mobility and assist in safe d/c.   PT Treatment Day 0   Plan   Treatment/Interventions Functional transfer training;LE strengthening/ROM;Elevations;Therapeutic exercise;Endurance training;Patient/family training;Equipment eval/education;Bed mobility;Gait training;Spoke to nursing;Spoke to case management;OT   PT Frequency 3-5x/wk   Discharge Recommendation   Rehab Resource Intensity Level, PT II (Moderate Resource Intensity)   Equipment Recommended Walker   Walker Package Recommended Wheeled walker   AM-PAC Basic Mobility Inpatient   Turning in Flat Bed Without Bedrails 2   Lying on Back to Sitting on Edge of Flat Bed Without Bedrails 2   Moving Bed to Chair 1   Standing Up From Chair Using Arms 1   Walk in Room 1   Climb 3-5 Stairs With Railing 1   Basic Mobility Inpatient Raw Score 8   Highest Level Of Mobility   JH-HLM Goal 3: Sit at edge of bed   JH-HLM Achieved 4: Move to chair/commode   Modified Holden Scale   Modified Holden Scale 4           Harika Thayer, PT DPT

## 2024-03-13 NOTE — PLAN OF CARE
Problem: PHYSICAL THERAPY ADULT  Goal: Performs mobility at highest level of function for planned discharge setting.  See evaluation for individualized goals.  Description: Treatment/Interventions: Functional transfer training, LE strengthening/ROM, Elevations, Therapeutic exercise, Endurance training, Patient/family training, Equipment eval/education, Bed mobility, Gait training, Spoke to nursing, Spoke to case management, OT  Equipment Recommended: Walker       See flowsheet documentation for full assessment, interventions and recommendations.  Note: Prognosis: Good  Problem List: Decreased strength, Decreased endurance, Impaired balance, Decreased mobility, Pain  Assessment: Pt is a 69 y.o. female seen for PT evaluation s/p admit to [facility] on 3/12/2024. Pt was admitted with a primary dx of: metastatic colon cancer to liver s/p R hemicolectomy 3/12.  PT now consulted for assessment of mobility and d/c needs. Pt with Ambulate orders. Pt  has a past medical history of Anemia, Arthritis, Body mass index (BMI) 40.0-44.9, adult (HCC), Breast cancer (HCC) (12/17/2018), Cancer (HCC), Cervical lymphadenopathy, Encounter for screening for HIV (07/07/2022), Follow-up examination (04/04/2023), GERD (gastroesophageal reflux disease), Hyperlipidemia, Hypertension, Obesity, Stroke (HCC), Stroke (HCC), Transaminitis (09/22/2021), and Vasomotor rhinitis. Pts current clinical presentation is Unstable/ Unpredictable (high complexity) due to Ongoing medical management for primary dx, Increased reliance on more restrictive AD compared to baseline, Decreased activity tolerance compared to baseline, Fall risk, Increased assistance needed from caregiver at current time, Cog status, Increased O2 via NC from pts baseline, Trending lab values, Continuous pulse oximetry monitoring , s/p surgical intervention. Prior to admission, pt was residing with family in 3rd floor apartment with 3 flights of steps to to enter 2*  elevator being broken and was typically independent without AD use. Upon evaluation, pt currently is requiring mod A for bed mobility; mod Ax2 for transfers; mod Ax2 for ambulation 4 ft w/ RW. Pt presents at PT eval functioning below baseline and currently w/ overall mobility deficits 2* to: BLE weakness, decreased ROM, impaired balance, decreased endurance, gait deviations, pain, decreased activity tolerance compared to baseline, decreased functional mobility tolerance compared to baseline, fall risk. Pt currently at a fall risk 2* to impairments listed above.  Pt will continue to benefit from skilled acute PT interventions to address stated impairments; to maximize functional mobility; for ongoing pt/ family training; and DME needs. At conclusion of PT session pt returned back in chair and chair alarm engaged with phone and call bell within reach. Pt denies any further questions at this time. The patient's AM-PAC Basic Mobility Inpatient Short Form Raw Score is 8. A Raw score of less than or equal to 16 suggests the patient may benefit from discharge to post-acute rehabilitation services. Please also refer to the recommendation of the Physical Therapist for safe discharge planning. Recommend level II resource intensity (rehab pending progress) upon hospital D/C.  Barriers to Discharge: Inaccessible home environment     Rehab Resource Intensity Level, PT: II (Moderate Resource Intensity)    See flowsheet documentation for full assessment.

## 2024-03-13 NOTE — ANESTHESIA POSTPROCEDURE EVALUATION
Post-Op Assessment Note    CV Status:  Stable  Pain Score: 0    Pain management: adequate       Mental Status:  Alert and awake   Hydration Status:  Euvolemic and stable   PONV Controlled:  None   Airway Patency:  Patent     Post Op Vitals Reviewed: Yes    No anethesia notable event occurred.    Staff: CRNA               /63 (03/12/24 2016)    Temp (!) 97.1 °F (36.2 °C) (03/12/24 2016)    Pulse 85 (03/12/24 2016)   Resp 22 (03/12/24 2016)    SpO2 98 % (03/12/24 2016)

## 2024-03-13 NOTE — PLAN OF CARE
Problem: OCCUPATIONAL THERAPY ADULT  Goal: Performs self-care activities at highest level of function for planned discharge setting.  See evaluation for individualized goals.  Description: Treatment Interventions: ADL retraining, Functional transfer training, Endurance training, Patient/family training, Equipment evaluation/education, Compensatory technique education, Continued evaluation, Energy conservation, Activityengagement          See flowsheet documentation for full assessment, interventions and recommendations.   Note: Limitation: Decreased ADL status, Decreased endurance, Decreased self-care trans, Decreased high-level ADLs     Assessment: Pt is a 69 y.o. female seen for OT evaluation s/p admission to [facility] on 3/12/2024. Pt diagnosed with metastatic colon cancer to liver; s/p robotic right hemicolectomy on 3/12. Pt has a significant PMH impacting occupational performance including: Anemia, Arthritis, Body mass index (BMI) 40.0-44.9, adult (HCC), Breast cancer (HCC), Cancer (HCC), Cervical lymphadenopathy, Encounter for screening for HIV, Follow-up examination, GERD (gastroesophageal reflux disease), Hyperlipidemia, Hypertension, Obesity, Stroke (HCC), Stroke (HCC), Transaminitis, and Vasomotor rhinitis. Pt with active OT evaluation and treatment orders and activity orders. PTA, pt living with her son and daughter in a 3rd floor apartment w/ 3 FFSTE vs elevator access (due to elevator often being out-out service). Pt agreeable and willing to participate in OT evaluation. During evaluation, pt was I for eating, S for grooming and UB ADLs, and mod A for LB ADLs and toileting. Pt also required mod Ax1 for bed mobility and mod Ax2 for transfers, and fxnl mobility w/ RW for support. Pt required + time to participate in fxnl tasks due to + pain and fatigue. Performance deficits that affect the pt’s occupational performance during the initial evaluation include decreased ADL status, decreased  activity tolerance, decreased endurance, decreased sitting tolerance, decreased sitting balance, decreased standing tolerance, decreased standing balance, decreased transfer skills, decreased fxnl mobility, and pain. Based on pt’s functional performance and deficits the following occupations will be addressed in OT treatments in order to maximize pt’s independence and overall occupational performance: grooming, bathing/showering, toileting and toilet hygiene, dressing, and functional mobility. Goals are listed below.  From OT perspective, recommend d/c to Post Acute Rehabilitation when pt medically stable to d/c from acute care. Will continue to follow.     Rehab Resource Intensity Level, OT: II (Moderate Resource Intensity)

## 2024-03-13 NOTE — QUICK NOTE
Post Op Check:    Patient reporting 8/10 pain notably in the lower abdomen along her incision. She is somnolent but responds to voice and answers questions appropriate. Pt made aware of prn analgesics    Temp:  [97 °F (36.1 °C)-98 °F (36.7 °C)] 97 °F (36.1 °C)  HR:  [] 98  Resp:  [15-22] 18  BP: (118-160)/(63-91) 160/91    Physical Exam:  General: somnolent  CV: Well perfused, regular rate  Lungs: Normal work of breathing, no increased respiratory effort  Abdomen: Soft, appropriately tender, non-distended. Incision(s) clean, dry and intact., no signs of hematoma from incision.  Extremities: No edema, clubbing or cyanosis  Skin: cool, dry    Eleazar Simons MD  PGY-1   03/12/24

## 2024-03-13 NOTE — PROGRESS NOTES
Follow up Consultation    Nephrology   Maira Moura 69 y.o. female MRN: [id number]  Unit/Bed#: [facility] 901-01 Encounter: [id number]      Physician Requesting Consult: Vickie Israel MD        ASSESSMENT:  69 y.o.  female with medical history of hypertension, obesity, GERD, CHF and CKD stage II/III A who was admitted for elective robotic right hemicolectomy for metastatic colon cancer on 3/12/2024. Nephrology has been consulted for perioperative optimization to reduce incidence for acute kidney injury.       Acute kidney injury on CKD stage II/III A:    GOGO most likely secondary to ischemic injury secondary to hemodynamic perturbations intraoperatively postoperatively plus use of NSAID on 3/13/2024 in light of underlying comorbidities.  After review of records it appears that the patient has a baseline Creatinine of 0.8-1.0 mg/dL.  Admission creatinine of 1.03 mg/dL on 3/12/2024 postoperatively.  Creatinine today is at 1.42 mg/dL.  check BMP, magnesium, phosphorus in a.m.  Recommend decrease Plasma-Lyte at 75 cc an hour for now and DC IV fluids once tolerating adequate p.o.  Place on a renal diet when allowed diet order.   DC Celebrex  Strict I/O.  Daily weights  Urinary retention protocol  Avoid nephrotoxins, adjust meds to appropriate GFR.  Likely has underlying CKD secondary to age-related nephron loss plus obesity hyperfiltration plus hypertensive nephrosclerosis  Outpatient for nephrology follows up with no nephrologist     H&H/anemia:  most recent hemoglobin at 6.8 g/dL  Maintain hemoglobin greater than 8 grams/deciliter  Management per primary team.  Follow-up hemoglobin with primary team consider PRBC transfusion if continues to drop     Acid-base electrolytes:  Hyperphosphatemia most recent phosphorus down to 2.4 stable and improving  Acid-base stable most recent bicarb 26, lactic acid improved down to 1.9, was as high as 4.5 yesterday        Blood pressure:   Optimize hemodynamic status to avoid delay  "in renal recovery.  Maintain MAP > 65mmHg  Avoid BP fluctuations.  Home medications: Losartan 50 mg p.o. daily, K-Dur 20 milliequivalents p.o. twice daily  Current medications: midodrine 5 mg p.o. 3 times daily hold for SBP greater than 110     Other medical problems     History of metastatic colon cancer:  Management per primary team.  Status post robotic right hemicolectomy on 3/12/2024        Plan below is what was discussed with the primary team that they agree with:  check BMP, magnesium, phosphorus in a.m.  Recommend decreasing Plasma-Lyte to 75 cc an hour for now and DC once tolerating adequate p.o.   DC Celebrex   hold home losartan for now  Recommend PRBC transfusion   Continue on midodrine 5 mg p.o. 3 times daily hold for SBP greater than 110 and gradually wean off    Thanks for the consult  Will continue to follow  Please call with questions/ concerns.      Ioana Curtis MD, FASN, 3/14/2024, 10:42 AM                Objective :   Patient seen and examined in her room blood pressure slightly low intermittently remains afebrile also got a dose of Celebrex overnight, urine output 1.1 L last 24 hours tolerating adequate clear liquid diet      PHYSICAL EXAM  /75   Pulse (!) 113   Temp 99.8 °F (37.7 °C)   Resp 16   Ht 5' 5\" (1.651 m)   Wt 97.5 kg (215 lb)   SpO2 91%   BMI 35.78 kg/m²   Temp (24hrs), Av.5 °F (36.9 °C), Min:97.8 °F (36.6 °C), Max:99.8 °F (37.7 °C)        Intake/Output Summary (Last 24 hours) at 3/14/2024 1042  Last data filed at 3/14/2024 0547  Gross per 24 hour   Intake 3431.52 ml   Output 1150 ml   Net 2281.52 ml       I/O last 24 hours:  In: 3671.5 [P.O.:720; I.V.:2851.5; IV Piggyback:100]  Out: 1150 [Urine:1150]      Current Weight: Weight - Scale: 97.5 kg (215 lb)  First Weight: Weight - Scale: 97.1 kg (214 lb)  Physical Exam  Vitals and nursing note reviewed.   Constitutional:       General: She is not in acute distress.     Appearance: Normal appearance. She is obese. She " is ill-appearing. She is not toxic-appearing or diaphoretic.   HENT:      Head: Normocephalic and atraumatic.      Mouth/Throat:      Mouth: Mucous membranes are moist.      Pharynx: No oropharyngeal exudate.   Eyes:      General: No scleral icterus.  Cardiovascular:      Rate and Rhythm: Normal rate.   Pulmonary:      Effort: Pulmonary effort is normal. No respiratory distress.      Breath sounds: No wheezing.   Abdominal:      Palpations: Abdomen is soft.      Tenderness: There is no abdominal tenderness.   Musculoskeletal:         General: Swelling present.      Cervical back: Normal range of motion. No rigidity.      Comments: Trace lower extremity edema   Skin:     Coloration: Skin is not jaundiced.   Neurological:      General: No focal deficit present.      Mental Status: She is alert.   Psychiatric:         Mood and Affect: Mood normal.         Behavior: Behavior normal.             Review of Systems   Constitutional:  Positive for fatigue. Negative for chills.   HENT:  Negative for congestion.    Respiratory:  Negative for cough and shortness of breath.    Cardiovascular:  Negative for leg swelling.   Gastrointestinal:  Positive for abdominal pain.   Genitourinary:  Negative for flank pain.   Musculoskeletal:  Negative for back pain.   Neurological:  Negative for headaches.   Psychiatric/Behavioral:  Negative for agitation and confusion.    All other systems reviewed and are negative.      Scheduled Meds:  Current Facility-Administered Medications   Medication Dose Route Frequency Provider Last Rate    acetaminophen  650 mg Oral Once Lisy Hallman PA-C      acetaminophen  975 mg Oral Q6H [facility] Marietta Irby PA-C      atorvastatin  40 mg Oral HS Jazmin Simmons DO      diphenhydrAMINE  25 mg Intravenous Once Lisy Hallman PA-C      docusate sodium  100 mg Oral BID Marietta Irby PA-C      gabapentin  300 mg Oral TID Marietta Irby PA-C      heparin (porcine)   5,000 Units Subcutaneous Q8H [facility] Simmons, DO      HYDROmorphone  0.5 mg Intravenous Q3H PRN Marietta Irby PA-C      midodrine  5 mg Oral TID AC Ioana Curtis MD      multi-electrolyte  125 mL/hr Intravenous Continuous [place] Simmons,  mL/hr (03/14/24 0547)    naloxone  0.04 mg Intravenous Q1MIN PRN Jazmin Simmons, DO      ondansetron  4 mg Intravenous Q6H PRN Jazmin Simmons, DO      oxyCODONE  5 mg Oral Q4H PRN Marietta Irby PA-C      oxyCODONE  2.5 mg Oral Q4H PRN Marietta Irby PA-C      potassium chloride  40 mEq Oral Once Lisy Hallman PA-C         PRN Meds:.  HYDROmorphone    naloxone    ondansetron    oxyCODONE    oxyCODONE    Continuous Infusions:multi-electrolyte, 125 mL/hr, Last Rate: 125 mL/hr (03/14/24 0547)          Invasive Devices:     Invasive Devices       Central Venous Catheter Line  Duration             Port A Cath 07/28/23 Right Internal jugular 229 days              Peripheral Intravenous Line  Duration             Peripheral IV 03/12/24 Left Antecubital 1 day    Peripheral IV 03/12/24 Left Hand 1 day    Peripheral IV 03/13/24 Left;Proximal;Upper;Ventral (anterior) Arm 1 day              Drain  Duration             Urethral Catheter Double-lumen;Latex 16 Fr. 1 day                      LABORATORY:    Results from last 7 days   Lab Units 03/14/24  0655 03/14/24  0454 03/13/24  0646 03/13/24  0220 03/12/24  2033 03/12/24  1926 03/12/24  1855   WBC Thousand/uL 10.32* 10.61* 11.38*  --  8.17  --   --    HEMOGLOBIN g/dL 6.8* 6.7* 8.2* 9.4* 8.3*  --   --    I STAT HEMOGLOBIN g/dl  --   --   --   --   --  7.1* 9.5*   HEMATOCRIT % 21.7* 20.9* 24.9* 29.6* 25.0*  --   --    HEMATOCRIT, ISTAT %  --   --   --   --   --  21* 28*   PLATELETS Thousands/uL 101* 112* 127*  --  143*  --   --    POTASSIUM mmol/L  --  3.5 3.9  --  3.9  --   --    CHLORIDE mmol/L  --  110* 108  --  108  --   --    CO2 mmol/L  --  26 25  --  22  --   --    CO2, I-STAT  "mmol/L  --   --   --   --   --  26 24   BUN mg/dL  --  15 9  --  9  --   --    CREATININE mg/dL  --  1.42* 0.90  --  1.03  --   --    CALCIUM mg/dL  --  7.8* 8.5  --  9.7  --   --    MAGNESIUM mg/dL  --  2.3 1.3*  --  1.4*  --   --    PHOSPHORUS mg/dL  --  2.4 3.1  --  5.3*  --   --    GLUCOSE, ISTAT mg/dl  --   --   --   --   --  209* 204*      rest all reviewed    RADIOLOGY:  CT head wo contrast   Final Result by Drew Kim DO (03/13 6202)   No acute intracranial abnormality.      Chronic pansinusitis. Bilateral mild mastoiditis.            Workstation performed: [id number]           Rest all reviewed    Portions of the record may have been created with voice recognition software. Occasional wrong word or \"sound a like\" substitutions may have occurred due to the inherent limitations of voice recognition software. Read the chart carefully and recognize, using context, where substitutions have occurred.If you have any questions, please contact the dictating provider.    "

## 2024-03-13 NOTE — PLAN OF CARE
Problem: PAIN - ADULT  Goal: Verbalizes/displays adequate comfort level or baseline comfort level  Description: Interventions:  - Encourage patient to monitor pain and request assistance  - Assess pain using appropriate pain scale  - Administer analgesics based on type and severity of pain and evaluate response  - Implement non-pharmacological measures as appropriate and evaluate response  - Consider cultural and social influences on pain and pain management  - Notify physician/advanced practitioner if interventions unsuccessful or patient reports new pain  Outcome: Progressing     Problem: INFECTION - ADULT  Goal: Absence or prevention of progression during hospitalization  Description: INTERVENTIONS:  - Assess and monitor for signs and symptoms of infection  - Monitor lab/diagnostic results  - Monitor all insertion sites, i.e. indwelling lines, tubes, and drains  - Monitor endotracheal if appropriate and nasal secretions for changes in amount and color  - [place] appropriate cooling/warming therapies per order  - Administer medications as ordered  - Instruct and encourage patient and family to use good hand hygiene technique  - Identify and instruct in appropriate isolation precautions for identified infection/condition  Outcome: Progressing  Goal: Absence of fever/infection during neutropenic period  Description: INTERVENTIONS:  - Monitor WBC    Outcome: Progressing     Problem: SAFETY ADULT  Goal: Patient will remain free of falls  Description: INTERVENTIONS:  - Educate patient/family on patient safety including physical limitations  - Instruct patient to call for assistance with activity   - Consult OT/PT to assist with strengthening/mobility   - Keep Call bell within reach  - Keep bed low and locked with side rails adjusted as appropriate  - Keep care items and personal belongings within reach  - Initiate and maintain comfort rounds  - Make Fall Risk Sign visible to staff  - Offer Toileting every 2 Hours,  in advance of need  - Initiate/Maintain alarm  - Obtain necessary fall risk management equipment:   - Apply yellow socks and bracelet for high fall risk patients  - Consider moving patient to room near nurses station  Outcome: Progressing  Goal: Maintain or return to baseline ADL function  Description: INTERVENTIONS:  -  Assess patient's ability to carry out ADLs; assess patient's baseline for ADL function and identify physical deficits which impact ability to perform ADLs (bathing, care of mouth/teeth, toileting, grooming, dressing, etc.)  - Assess/evaluate cause of self-care deficits   - Assess range of motion  - Assess patient's mobility; develop plan if impaired  - Assess patient's need for assistive devices and provide as appropriate  - Encourage maximum independence but intervene and supervise when necessary  - Involve family in performance of ADLs  - Assess for home care needs following discharge   - Consider OT consult to assist with ADL evaluation and planning for discharge  - Provide patient education as appropriate  Outcome: Progressing  Goal: Maintains/Returns to pre admission functional level  Description: INTERVENTIONS:  - Perform AM-PAC 6 Click Basic Mobility/ Daily Activity assessment daily.  - Set and communicate daily mobility goal to care team and patient/family/caregiver.   - Collaborate with rehabilitation services on mobility goals if consulted  Outcome: Progressing     Problem: DISCHARGE PLANNING  Goal: Discharge to home or other facility with appropriate resources  Description: INTERVENTIONS:  - Identify barriers to discharge w/patient and caregiver  - Arrange for needed discharge resources and transportation as appropriate  - Identify discharge learning needs (meds, wound care, etc.)  - Arrange for interpretive services to assist at discharge as needed  - Refer to Case Management Department for coordinating discharge planning if the patient needs post-hospital services based on  physician/advanced practitioner order or complex needs related to functional status, cognitive ability, or social support system  Outcome: Progressing     Problem: Knowledge Deficit  Goal: Patient/family/caregiver demonstrates understanding of disease process, treatment plan, medications, and discharge instructions  Description: Complete learning assessment and assess knowledge base.  Interventions:  - Provide teaching at level of understanding  - Provide teaching via preferred learning methods  Outcome: Progressing     Problem: Prexisting or High Potential for Compromised Skin Integrity  Goal: Skin integrity is maintained or improved  Description: INTERVENTIONS:  - Identify patients at risk for skin breakdown  - Assess and monitor skin integrity  - Assess and monitor nutrition and hydration status  - Monitor labs   - Assess for incontinence   - Turn and reposition patient  - Assist with mobility/ambulation  - Relieve pressure over bony prominences  - Avoid friction and shearing  - Provide appropriate hygiene as needed including keeping skin clean and dry  - Evaluate need for skin moisturizer/barrier cream  - Collaborate with interdisciplinary team   - Patient/family teaching  - Consider wound care consult   Outcome: Progressing

## 2024-03-13 NOTE — CASE MANAGEMENT
Case Management Assessment & Discharge Planning Note    Patient name Maira Moura  Location [facility] 901/[facility] 901-01 MRN [id number]  : 1955 Date 3/13/2024       Current Admission Date: 3/12/2024  Current Admission Diagnosis:Metastatic colon cancer to liver (HCC)   Patient Active Problem List    Diagnosis Date Noted    Diastolic dysfunction 2024    Neuropathy 2024    Platelets decreased (HCC) 2023    Stage 3 chronic kidney disease, unspecified whether stage 3a or 3b CKD (HCC) 2023    Chemotherapy induced neutropenia  2023    Dehydration 10/06/2023    Drug-induced constipation 10/06/2023    Anemia 2023    Poor appetite 2023    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HCC) 2023    Metastatic colon cancer to liver (HCC) 2023    GERD (gastroesophageal reflux disease)     Obesity     Right thyroid nodule 2023    Prediabetes 2022    Vitamin D deficiency 2019    Malignant neoplasm of right female breast (HCC) 2018    Mixed hyperlipidemia 2018    Primary hypertension 2017      LOS (days): 1  Geometric Mean LOS (GMLOS) (days):   Days to GMLOS:     OBJECTIVE:    Risk of Unplanned Readmission Score: 19.38         Current admission status: Inpatient  Referral Reason: Other (post acute needs)    [facility] Pharm[facility]    [facility] [place] [address]  Phone: [phone] Fax: [phone]    Primary Care Provider: Fanta Turner MD    Primary Insurance: MEDICARE  Secondary Insurance: [facility]    ASSESSMENT:  Active Health Care Proxies    There are no active Health Care Proxies on file.                 Readmission Root Cause  30 Day Readmission: No    Patient Information  Admitted from:: Home  Mental Status: Other (Comment) (Pt sleeping during assessment)  During Assessment patient was accompanied by: Daughter  Assessment  information provided by:: Daughter  Primary Caregiver: Self  Support Systems: Self, Daughter, Son  County of Residence: [place]  What city do you live in?: [place]  Home entry access options. Select all that apply.: Stairs  Number of steps to enter home.:  (Pt has 3 flights of stairs)  Do the steps have railings?: Yes  Type of Current Residence: Apartment  Floor Level: 3  Upon entering residence, is there a bedroom on the main floor (no further steps)?: Yes  Upon entering residence, is there a bathroom on the main floor (no further steps)?: Yes  Living Arrangements: Lives w/ Son, Lives w/ Daughter  Is patient a ?: No    Activities of Daily Living Prior to Admission  Functional Status: Independent  Completes ADLs independently?: Yes  Ambulates independently?: Yes  Does patient use assisted devices?: No  Does patient currently own DME?: No  Does patient have a history of Outpatient Therapy (PT/OT)?: No  Does the patient have a history of Short-Term Rehab?: No  Does patient have a history of HHC?: No  Does patient currently have HHC?: No         Patient Information Continued  Income Source: Pension/FDC  Does patient have prescription coverage?: Yes  Does patient receive dialysis treatments?: No  Does patient have a history of substance abuse?: No  Does patient have a history of Mental Health Diagnosis?: No         Means of Transportation  Means of Transport to Appts:: Public Transportation - Bus      Social Determinants of Health (SDOH)      Flowsheet Row Most Recent Value   Housing Stability    In the last 12 months, was there a time when you were not able to pay the mortgage or rent on time? N   In the last 12 months, how many places have you lived? 1   In the last 12 months, was there a time when you did not have a steady place to sleep or slept in a shelter (including now)? N   Transportation Needs    In the past 12 months, has lack of transportation kept you from medical appointments or from getting  medications? no   In the past 12 months, has lack of transportation kept you from meetings, work, or from getting things needed for daily living? No   Food Insecurity    Within the past 12 months, you worried that your food would run out before you got the money to buy more. Never true   Within the past 12 months, the food you bought just didn't last and you didn't have money to get more. Never true   Utilities    In the past 12 months has the electric, gas, oil, or water company threatened to shut off services in your home? No            DISCHARGE DETAILS:    Discharge planning discussed with:: Pt's daughter Marleny  Freedom of Choice: Yes     CM contacted family/caregiver?: Yes  Were Treatment Team discharge recommendations reviewed with patient/caregiver?: Yes  Did patient/caregiver verbalize understanding of patient care needs?: Yes  Were patient/caregiver advised of the risks associated with not following Treatment Team discharge recommendations?: Yes    Contacts  Patient Contacts: Marleny Martell-daughter  Relationship to Patient:: Family  Contact Method: In Person  Reason/Outcome: Continuity of Care, Emergency Contact, Referral, Discharge Planning    Requested Home Health Care         Is the patient interested in HHC at discharge?: No    DME Referral Provided  Referral made for DME?: No               CM completed assessment and explained CM role.  Pt sleeping during assessment.  Pt's daughter Marleny present during assessment and answered all questions.  Pt currently resides with her son and daughter in an apartment on the 3rd floor.  Pt has 3 flights of steps to enter her apartment, elevator currently does not work.  Pt was independent with all ADL's prior to admission.  Pt has no DME.  Pt has no hx of outpatient PT, STR and HHC.  Pt has no hx of mental health or substance use hx.  Pt uses the [employer] bus for transportation.  CM will continue to follow for discharge planning needs.

## 2024-03-13 NOTE — OP NOTE
OPERATIVE REPORT  PATIENT NAME: Maira Moura    :  1955  MRN: [id number]  Pt Location: [facility] OR ROOM 15    SURGERY DATE: 3/12/2024    Surgeons and Role:     * Vickie Israel MD - Primary     * Crissy Perciado PA-C - Assisting     * Jazmin Simmons DO - Assisting    Preop Diagnosis:  Metastatic colon cancer to liver (HCC) [C18.9, C78.7]    Post-Op Diagnosis Codes:     * Metastatic colon cancer to liver (HCC) [C18.9, C78.7]    Procedure(s):  Right - RIGHT COLECTOMY WITH ROBOTICS  LAPAROTOMY EXPLORATORY W/ ROBOTICS    Specimen(s):  ID Type Source Tests Collected by Time Destination   1 : Right colon Tissue Colon TISSUE EXAM Vickie Israel MD 3/12/2024 1844        Estimated Blood Loss:   300 mL    Drains:  Urethral Catheter Double-lumen;Latex 16 Fr. (Active)   Reasons to continue Urinary Catheter  Post-operative urological requirements 24 0745   Goal for Removal Remove after 48 hrs of I/O monitoring 24 0745   Site Assessment Clean;Skin intact 24 0745   Pope Care Done 24 0900   Collection Container Standard drainage bag 24 0745   Securement Method Securing device (Describe) 24 0745   Output (mL) 850 mL 24 0413   Number of days: 1       Anesthesia Type:   General    Operative Indications:  Metastatic colon cancer to liver (HCC) [C18.9, C78.7]    Patient with: Cell cancer metastatic to the liver with excellent response.  Colon tumor was becoming partially obstructive and symptomatic and thus required resection    Operative Findings:  Bulky ascending colon tumor    Complications:   None    Procedure and Technique:  The patient was identified in the operating suite and general endotracheal ovation achieved.  An OG tube was placed and the arms padded and tucked.  The abdomen was prepped and draped in the usual sterile fashion.  A timeout was performed.  At Jacobs's point, 5 mm laparoscopic trocar was advanced under direct visualization and the abdomen  insufflated.  3 additional trocars were placed from 2 cm superior to the suprapubic bone to the left upper quadrant.  An assist port was placed in the left lower quadrant.  The small bowel and omentum were swept into the upper abdomen into the left upper quadrant and the patient placed in Trendelenburg with the left side down.  The robot was then docked.  Dissection began at the ileocolic pedicle.  The peritoneum oversizing the pedicle was incised and the retroperitoneal tissue swept posteriorly, taking care to ensure that the ureter and duodenum remained in their respective locations in the retroperitoneum.  The dissection was carried superiorly and laterally, mobilizing the entire ascending colon from its retroperitoneal attachments.  Once we reached the lateral abdominal wall, dissection continued at the white line of Toldt, incising the peritoneum of the abdominal wall at the right lower quadrant just lateral to the appendix and carrying the dissection superiorly.  The terminal ileum was divided with a single load of the blue loaded stapler proximally 10 cm proximal to the ileocolic junction.  At this juncture, the ascending colon was completely mobilized, tethered only by the ileocolic pedicle.  This was divided with a vascular load of the sure form stapler.  We then continued the dissection at the hepatocolic ligament, mobilizing the proximal omentum off of the transverse colon and entering the hepatocolic plane to mobilize the proximal transverse colon.  This was carefully lifted with its mesentery intact off the retroperitoneal structures and Gerota's fascia, completing the inferior dissection at the superior duodenum.  The tumor was large, firm and bulky in this region and difficult to manipulate.  A serosal injury to the duodenum was repaired with a 2-0 silk figure-of-eight Lembert suture.  Once the proximal transverse colon was completely mobilized, the middle colic vessels were visualized.  There was  redundant transverse colon, and an adequate node harvest was obtained by dividing a right sided branch of the middle colic.  The colon was divided at this mesenteric juncture with an additional load of the blue load stapler, and division of the mesentery was carefully undertaken with the vessel sealer device, ensuring hemostasis.  The specimen was now completely freed and placed in the left upper quadrant.  ICG was injected and flushed, confirming excellent blood flow to both the transverse colon staple line as well as the ileum staple line.  Approximately 6 cm proximal to the staple lines, enterotomies were created and a side-to-side anastomosis was function with a single load of the 60 mm stapler.  When the lumen was visualized through the common enterotomy to confirm hemostasis, it became clear that there was inspissated stool that had formed firm stool balls within the transverse colon.  These were evacuated as best as possible.  The common enterotomy was then closed with 3 oh V-Loc suture in 2 layers, the first with a serosal mucosal running suture and the second with Lembert suture.  A crotch suture was then placed with 3-0 silk to move additional tension on the staple line.  The right lower quadrant was then copiously irrigated, given the stool contamination.  The retroperitoneal plane was brought in slightly oozy, and Surgiflo was placed to confirm hemostasis.  This was successful.  Omentum was then placed over the duodenal and anastomotic sites, hemostasis again confirmed.  The specimen was then grasped and a 5 cm Pfannenstiel created at the lowermost incision.  We attempted to close the 12 mm assist in robotic ports, however, the thickness of the abdominal wall precluded safe closure, as the suture passer device was completely hubbed and could not be visualized.  Given the low risk of hernia at this location, this was aborted.  We then opened the Pfannenstiel fascia along the horizontal, creating flaps  deep to the fascia.  The epigastric was crossing this region, and was clipped and divided.  The peritoneum was then opened and a wound protector placed.  The specimen was delivered and passed off the field.  The peritoneum was then closed with 0 Vicryl, and the fascia with running #1 PDS.  The Pfannenstiel was then copiously irrigated and stapled.  The patient tolerated the procedure well with mild hypercarbia that had resolved by the conclusion of the case.     I was present for the entire procedure. and A physician assistant was required during the procedure for retraction, tissue handling, dissection and suturing as well as expert bedside assist    Patient Disposition:  PACU     Colon Resection  Operation performed with curative intent No   Tumor Location (select all that apply) Ascending colon   Extent of colon and vascular resection (select all that apply) Right hemicolectomy - ileocolic, right colic (if present)          SIGNATURE: Vickie Israel MD  DATE: March 13, 2024  TIME: 3:08 PM

## 2024-03-13 NOTE — CONSULTS
Consultation    Nephrology   Maira Moura 69 y.o. female MRN: [id number]  Unit/Bed#: [facility] 901-01 Encounter: [id number]    History of Present Illness   Physician Requesting Consult: Vickie Israel MD  Reason for Consult : -Perioperative optimization to reduce incidence for acute kidney injury    ASSESSMENT:   69 y.o.  female with medical history of hypertension, obesity, GERD, CHF and CKD stage II/III A who was admitted for elective robotic right hemicolectomy for metastatic colon cancer on 3/12/2024. Nephrology has been consulted for perioperative optimization to reduce incidence for acute kidney injury.      Perioperative optimization to reduce incidence for acute kidney injury/CKD stage II/III A:   At risk for GOGO most likely secondary to ischemic injury secondary to hemodynamic perturbations intraoperatively in light of underlying comorbidities.  After review of records it appears that the patient has a baseline Creatinine of 0.8-1.0 mg/dL.  Admission creatinine of 1.03 mg/dL on 3/12/2024 postoperatively.  Creatinine today is at 0.90 mg/dL.  check BMP, magnesium, phosphorus in a.m.  Continue with Plasma-Lyte at 125 cc an hour and DC IV fluids once tolerating adequate p.o.  Place on a renal diet when allowed diet order.   Strict I/O.  Daily weights  Urinary retention protocol  Avoid nephrotoxins, adjust meds to appropriate GFR.  Likely has underlying CKD secondary to age-related nephron loss plus obesity hyperfiltration plus hypertensive nephrosclerosis  Outpatient for nephrology follows up with no nephrologist    H&H/anemia:  most recent hemoglobin at 8.2 g/dL  Maintain hemoglobin greater than 8 grams/deciliter  Management per primary team.    Acid-base electrolytes:  Hyperphosphatemia most recent phosphorus down to 3.1 stable and improving  Acid-base stable most recent bicarb 25, lactic acid improved down to 1.9, was as high as 4.5 earlier this a.m.      Blood pressure:   Optimize hemodynamic status to  avoid delay in renal recovery.  Maintain MAP > 65mmHg  Avoid BP fluctuations.  Home medications: Losartan 50 mg p.o. daily, K-Dur 20 milliequivalents p.o. twice daily  Current medications: None  For now we will place on midodrine 5 mg p.o. 3 times daily hold for SBP greater than 110    Other medical problems    History of metastatic colon cancer:  Management per primary team.  Status post robotic right hemicolectomy on 3/12/2024      Plan below is what was discussed with the primary team that they agree with:  check BMP, magnesium, phosphorus in a.m.  Continue Plasma-Lyte 100 cc an hour for now and can DC once tolerating adequate p.o.  Hold home losartan for now  for now will place on midodrine 5 mg p.o. 3 times daily hold for SBP greater than 110 and gradually wean off    Thanks for the consult  Will continue to follow  Please call with questions/ concerns.      Ioana Curtis MD, FASN, 3/13/2024, 11:29 AM          HISTORY OF PRESENT ILLNESS:   Maira Moura is a 69 y.o.  female with medical history of hypertension, obesity, GERD, CHF and CKD stage II/III A who was admitted for elective robotic right hemicolectomy for metastatic colon cancer on 3/12/2024. Nephrology has been consulted for perioperative optimization to reduce incidence for acute kidney injury.  Review of record shows patient has a baseline creatinine 0.8 to 1.0 mg/dL creatinine postoperatively on 3/12/2024 was at 1.03 mg/dL.  Patient seen and examined in her room no overnight events hemodynamically stable did have elevated lactate levels overnight.  At home was on losartan.  Currently on IV fluids started on a clear liquid diet.  No history of GOGO needing dialysis no history of kidney stones never seen a nephrologist before thank Ferfolic information that is gone today no NSAID usage.  History obtained from chart review and the patient    Inpatient consult to Nephrology  Consult performed by: Ioana Curtis MD  Consult ordered by: Terry  MD Amos          Review of Systems   Constitutional:  Positive for fatigue. Negative for chills.   HENT:  Negative for congestion.    Respiratory:  Negative for shortness of breath and wheezing.    Cardiovascular:  Negative for leg swelling.   Gastrointestinal:  Positive for abdominal pain.   Genitourinary:  Negative for flank pain.   Musculoskeletal:  Negative for back pain.   Neurological:  Negative for headaches.   Psychiatric/Behavioral:  Negative for agitation and confusion.    All other systems reviewed and are negative.       Historical Information   Patient Active Problem List   Diagnosis    Primary hypertension    Mixed hyperlipidemia    Malignant neoplasm of right female breast (HCC)    Vitamin D deficiency    Prediabetes    Right thyroid nodule    GERD (gastroesophageal reflux disease)    Obesity    Metastatic colon cancer to liver (HCC)    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HCC)    Poor appetite    Anemia    Dehydration    Drug-induced constipation    Chemotherapy induced neutropenia     Stage 3 chronic kidney disease, unspecified whether stage 3a or 3b CKD (HCC)    Platelets decreased (HCC)    Neuropathy    Diastolic dysfunction     Past Medical History:   Diagnosis Date    Anemia     Arthritis     Body mass index (BMI) 40.0-44.9, adult (HCC)     Breast cancer (HCC) 12/17/2018    Cancer (HCC)     right breast, colon, liver, right tonsil    Cervical lymphadenopathy     CT neck on 3/30/2023- Large right level 2A lymphadenopathy as described above and suspicious for neoplasm.  Correlation with histopathology is recommended.  Mild asymmetric prominence of the right palatine tonsil with otherwise no definitive nodular enhancing lesions identified along the course of the aerodigestive tract.     5/26/23- FNA of this node was nonrevealing for tissue etiology, but it d    Encounter for screening for HIV 07/07/2022    Follow-up examination 04/04/2023    GERD (gastroesophageal reflux disease)      Hyperlipidemia     Hypertension     Obesity     Stroke (HCC)     TIA     Stroke (HCC)     TIA     Transaminitis 09/22/2021    Vasomotor rhinitis     Refilled flonase today. Stopped taking since January 2022     Past Surgical History:   Procedure Laterality Date    BREAST BIOPSY Right 11/23/2018    us guided bx cancer    BREAST SURGERY      COLONOSCOPY      ESOPHAGOSCOPY N/A 07/20/2023    Procedure: ESOPHAGOSCOPY;  Surgeon: David Chapa MD;  Location: [facility];  Service: ENT    IR BIOPSY LIVER MASS  06/27/2023    IR PORT CHECK  01/03/2024    IR PORT PLACEMENT  07/28/2023    IR PORT STRIPPING  01/09/2024    NC BRNCHSC INCL FLUOR GDNCE DX W/CELL WASHG SPX N/A 07/20/2023    Procedure: BRONCHOSCOPY;  Surgeon: David Chapa MD;  Location: [facility];  Service: ENT    NC LARYNGOSCOPY W/BIOPSY MICROSCOPE/TELESCOPE N/A 07/20/2023    Procedure: MICRODIRECT LARYNGOSCOPY WITH BIOPSY;  Surgeon: David Chapa MD;  Location: [facility];  Service: ENT    NC MASTECTOMY PARTIAL W/AXILLARY LYMPHADENECTOMY Right 12/20/2018    Procedure: ULTRASOUND LOCALIZED PARTIAL MASTECTOMY W/SENTINEL NODE BIOPSY POSS AXILLARY DISSECTION;  Surgeon: Zahida Coronado MD;  Location: [facility];  Service: General    TUBAL LIGATION      US GUIDED BREAST BIOPSY RIGHT COMPLETE Right 11/23/2018    US GUIDED INJECTION FOR RESEARCH STUDY  12/20/2018    US GUIDED INJECTION FOR RESEARCH STUDY  12/07/2018    US GUIDED INJECTION FOR RESEARCH STUDY  05/03/2019    US GUIDED LYMPH NODE BIOPSY RIGHT  05/26/2023     Social History   Social History     Substance and Sexual Activity   Alcohol Use Never     Social History     Substance and Sexual Activity   Drug Use Never     Social History     Tobacco Use   Smoking Status Never    Passive exposure: Never   Smokeless Tobacco Never   Tobacco Comments    NO TOBACCO USE     Family History   Problem Relation Age of Onset    Colon cancer Mother 58    Hypertension Mother     Pancreatic cancer Father 60     Hypertension Daughter     Hypertension Son        Meds/Allergies   current meds:   Current Facility-Administered Medications   Medication Dose Route Frequency    acetaminophen (TYLENOL) tablet 975 mg  975 mg Oral Q8H RAFFY    atorvastatin (LIPITOR) tablet 40 mg  40 mg Oral HS    gabapentin (NEURONTIN) capsule 100 mg  100 mg Oral TID    heparin (porcine) subcutaneous injection 5,000 Units  5,000 Units Subcutaneous Q8H RAFFY    HYDROmorphone (DILAUDID) 1 mg/mL 50 mL PCA   Intravenous Continuous    magnesium sulfate 4 g/100 mL IVPB (premix) 4 g  4 g Intravenous Once    methocarbamol (ROBAXIN) tablet 500 mg  500 mg Oral Q6H RAFFY    midodrine (PROAMATINE) tablet 5 mg  5 mg Oral TID AC    multi-electrolyte (PLASMALYTE-A/ISOLYTE-S PH 7.4) IV solution  125 mL/hr Intravenous Continuous    naloxone (NARCAN) 0.04 mg/mL syringe 0.04 mg  0.04 mg Intravenous Q1MIN PRN    ondansetron (ZOFRAN) injection 4 mg  4 mg Intravenous Q6H PRN    and PTA meds:   Prior to Admission Medications   Prescriptions Last Dose Informant Patient Reported? Taking?   Cyanocobalamin (B-12 PO) Not Taking Self Yes No   Sig: Take by mouth   Patient not taking: Reported on 3/7/2024   anastrozole (ARIMIDEX) 1 mg tablet 3/11/2024  No Yes   Sig: Take 1 tablet (1 mg total) by mouth daily   atorvastatin (LIPITOR) 40 mg tablet 3/11/2024 Self No Yes   Sig: Take 1 tablet (40 mg total) by mouth daily at bedtime   docusate sodium (COLACE) 50 mg capsule 3/11/2024 Self No Yes   Sig: Take 1 capsule (50 mg total) by mouth 2 (two) times a day   ergocalciferol (VITAMIN D2) 50,000 units Past Week  No Yes   Sig: Take 1 capsule (50,000 Units total) by mouth once a week   folic acid (FOLVITE) 1 mg tablet Past Month  No Yes   Sig: Take 1 tablet (1 mg total) by mouth in the morning   lidocaine-prilocaine (EMLA) cream 3/11/2024  No Yes   Sig: Apply topically as needed for mild pain   losartan (COZAAR) 50 mg tablet 3/11/2024 Self No Yes   Sig: Take 1 tablet (50 mg total) by mouth daily    mirtazapine (REMERON) 30 mg tablet 3/11/2024  No Yes   Sig: Take 1 tablet (30 mg total) by mouth daily at bedtime   omeprazole (PriLOSEC) 20 mg delayed release capsule 3/11/2024  No Yes   Sig: Take 1 capsule (20 mg total) by mouth daily   ondansetron (ZOFRAN) 8 mg tablet 3/11/2024  No Yes   Sig: Take 1 tablet (8 mg total) by mouth every 8 (eight) hours as needed for nausea or vomiting   potassium chloride (K-DUR,KLOR-CON) 20 mEq tablet Past Week  No Yes   Sig: Take 1 tablet (20 mEq total) by mouth 2 (two) times a day   prochlorperazine (COMPAZINE) 10 mg tablet Unknown  No No   Sig: Take 1 tablet (10 mg total) by mouth every 6 (six) hours as needed for nausea or vomiting   pyridoxine (VITAMIN B6) 50 mg tablet Past Week  No Yes   Sig: Take 1 tablet (50 mg total) by mouth daily      Facility-Administered Medications: None       Scheduled Meds:  Current Facility-Administered Medications   Medication Dose Route Frequency Provider Last Rate    acetaminophen  975 mg Oral Q8H [facility] Jazmin Simmons, DO      atorvastatin  40 mg Oral HS Jazmin Simmons, DO      gabapentin  100 mg Oral TID Jazmin Simmons DO      heparin (porcine)  5,000 Units Subcutaneous Q8H [facility] Jazmin Simmons, DO      HYDROmorphone   Intravenous Continuous Jazmin Simmons, DO      magnesium sulfate  4 g Intravenous Once Marietta Irby PA-C 4 g (03/13/24 0941)    methocarbamol  500 mg Oral Q6H [facility] Jazmin Simmons, DO      midodrine  5 mg Oral TID AC Ioana Curtis MD      multi-electrolyte  125 mL/hr Intravenous Continuous Jazmin Simmons  mL/hr (03/13/24 0556)    naloxone  0.04 mg Intravenous Q1MIN PRN Jazmin Simmons, DO      ondansetron  4 mg Intravenous Q6H PRN Jazmin Simmons, DO         PRN Meds:.  naloxone    ondansetron    Continuous Infusions:HYDROmorphone,   multi-electrolyte, 125 mL/hr, Last Rate: 125 mL/hr (03/13/24 0556)        No Known Allergies      Invasive Devices:     Invasive Devices        "Central Venous Catheter Line  Duration             Port A Cath 23 Right Internal jugular 228 days              Peripheral Intravenous Line  Duration             Peripheral IV 24 Left Antecubital <1 day    Peripheral IV 24 Left Hand <1 day    Peripheral IV 24 Left;Proximal;Upper;Ventral (anterior) Arm <1 day              Drain  Duration             Urethral Catheter Double-lumen;Latex 16 Fr. <1 day                      PHYSICAL EXAM  BP 99/70   Pulse (!) 109   Temp 99.2 °F (37.3 °C)   Resp 14   Ht 5' 5\" (1.651 m)   Wt 97.5 kg (215 lb)   SpO2 91%   BMI 35.78 kg/m²   Temp (24hrs), Av °F (36.7 °C), Min:97 °F (36.1 °C), Max:100.2 °F (37.9 °C)        Intake/Output Summary (Last 24 hours) at 3/13/2024 1129  Last data filed at 3/13/2024 0556  Gross per 24 hour   Intake 5659.33 ml   Output  ml   Net 3664.33 ml       I/O last 24 hours:  In: 5659.3 [I.V.:3759.3; IV Piggyback:1900]  Out:  [Urine:1695; Blood:300]          Current Weight: Weight - Scale: 97.5 kg (215 lb)  First Weight: Weight - Scale: 97.1 kg (214 lb)  Physical Exam  Vitals and nursing note reviewed.   Constitutional:       General: She is not in acute distress.     Appearance: Normal appearance. She is obese. She is not ill-appearing, toxic-appearing or diaphoretic.   HENT:      Head: Normocephalic and atraumatic.      Mouth/Throat:      Mouth: Mucous membranes are moist.      Pharynx: No oropharyngeal exudate.   Eyes:      General: No scleral icterus.  Cardiovascular:      Rate and Rhythm: Normal rate.   Pulmonary:      Effort: No respiratory distress.      Breath sounds: Normal breath sounds. No stridor. No wheezing.   Abdominal:      Palpations: Abdomen is soft.      Tenderness: There is no abdominal tenderness.   Musculoskeletal:         General: No swelling.      Cervical back: Normal range of motion. No rigidity.   Skin:     Coloration: Skin is not jaundiced.   Neurological:      General: No focal deficit present. " "     Mental Status: She is alert and oriented to person, place, and time.   Psychiatric:         Mood and Affect: Mood normal.           LABORATORY:    Results from last 7 days   Lab Units 03/13/24  0646 03/13/24 0220 03/12/24 2033 03/12/24 1926 03/12/24  1855   WBC Thousand/uL 11.38*  --  8.17  --   --    HEMOGLOBIN g/dL 8.2* 9.4* 8.3*  --   --    I STAT HEMOGLOBIN g/dl  --   --   --  7.1* 9.5*   HEMATOCRIT % 24.9* 29.6* 25.0*  --   --    HEMATOCRIT, ISTAT %  --   --   --  21* 28*   PLATELETS Thousands/uL 127*  --  143*  --   --    POTASSIUM mmol/L 3.9  --  3.9  --   --    CHLORIDE mmol/L 108  --  108  --   --    CO2 mmol/L 25  --  22  --   --    CO2, I-STAT mmol/L  --   --   --  26 24   BUN mg/dL 9  --  9  --   --    CREATININE mg/dL 0.90  --  1.03  --   --    CALCIUM mg/dL 8.5  --  9.7  --   --    MAGNESIUM mg/dL 1.3*  --  1.4*  --   --    PHOSPHORUS mg/dL 3.1  --  5.3*  --   --    GLUCOSE, ISTAT mg/dl  --   --   --  209* 204*      rest all reviewed    RADIOLOGY:  No orders to display     Rest all reviewed    Portions of the record may have been created with voice recognition software. Occasional wrong word or \"sound a like\" substitutions may have occurred due to the inherent limitations of voice recognition software. Read the chart carefully and recognize, using context, where substitutions have occurred.If you have any questions, please contact the dictating provider.    "

## 2024-03-13 NOTE — OCCUPATIONAL THERAPY NOTE
Occupational Therapy Evaluation     Patient Name: Maira Moura  Today's Date: 3/13/2024  Problem List  Active Problems:  There are no active Hospital Problems.    Past Medical History  Past Medical History:   Diagnosis Date    Anemia     Arthritis     Body mass index (BMI) 40.0-44.9, adult (HCC)     Breast cancer (HCC) 12/17/2018    Cancer (HCC)     right breast, colon, liver, right tonsil    Cervical lymphadenopathy     CT neck on 3/30/2023- Large right level 2A lymphadenopathy as described above and suspicious for neoplasm.  Correlation with histopathology is recommended.  Mild asymmetric prominence of the right palatine tonsil with otherwise no definitive nodular enhancing lesions identified along the course of the aerodigestive tract.     5/26/23- FNA of this node was nonrevealing for tissue etiology, but it d    Encounter for screening for HIV 07/07/2022    Follow-up examination 04/04/2023    GERD (gastroesophageal reflux disease)     Hyperlipidemia     Hypertension     Obesity     Stroke (HCC)     TIA     Stroke (HCC)     TIA     Transaminitis 09/22/2021    Vasomotor rhinitis     Refilled flonase today. Stopped taking since January 2022     Past Surgical History  Past Surgical History:   Procedure Laterality Date    BREAST BIOPSY Right 11/23/2018    us guided bx cancer    BREAST SURGERY      COLONOSCOPY      ESOPHAGOSCOPY N/A 07/20/2023    Procedure: ESOPHAGOSCOPY;  Surgeon: David Chapa MD;  [facility];  Service: ENT    HEMICOLOECTOMY W/ ANASTOMOSIS Right 3/12/2024    Procedure: RIGHT COLECTOMY WITH ROBOTICS;  Surgeon: Vickie Israel MD;  [facility];  Service: Surgical Oncology    IR BIOPSY LIVER MASS  06/27/2023    IR PORT CHECK  01/03/2024    IR PORT PLACEMENT  07/28/2023    IR PORT STRIPPING  01/09/2024    LAPAROTOMY N/A 3/12/2024    Procedure: LAPAROTOMY EXPLORATORY W/ ROBOTICS;  Surgeon: Vickie Israel MD;  [facility]: [facility];  Service: Surgical Oncology    TX  [facility] INCL FLUOR GDNCE DX W/CELL WASHG SPX N/A 07/20/2023    Procedure: BRONCHOSCOPY;  Surgeon: David Chapa MD;  [facility];  Service: ENT    DE LARYNGOSCOPY W/BIOPSY MICROSCOPE/TELESCOPE N/A 07/20/2023    Procedure: MICRODIRECT LARYNGOSCOPY WITH BIOPSY;  Surgeon: David Chapa MD;  [facility]: [facility];  Service: ENT    DE MASTECTOMY PARTIAL W/AXILLARY LYMPHADENECTOMY Right 12/20/2018    Procedure: ULTRASOUND LOCALIZED PARTIAL MASTECTOMY W/SENTINEL NODE BIOPSY POSS AXILLARY DISSECTION;  Surgeon: Zahida Coronado MD;  Location: [facility];  Service: General    TUBAL LIGATION      US GUIDED BREAST BIOPSY RIGHT COMPLETE Right 11/23/2018    US GUIDED INJECTION FOR RESEARCH STUDY  12/20/2018    US GUIDED INJECTION FOR RESEARCH STUDY  12/07/2018    US GUIDED INJECTION FOR RESEARCH STUDY  05/03/2019    US GUIDED LYMPH NODE BIOPSY RIGHT  05/26/2023 03/13/24 1024   OT Last Visit   OT Visit Date 03/13/24   Note Type   Note type Evaluation   Pain Assessment   Pain Assessment Tool 0-10   Pain Score No Pain  (at rest)   Pain Location/Orientation Location: Abdomen   Effect of Pain on Daily Activities limits activity tolerance, mobility, and I w/ ADLs   Patient's Stated Pain Goal No pain   Hospital Pain Intervention(s) Repositioned;Ambulation/increased activity;Environmental changes   Restrictions/Precautions   Weight Bearing Precautions Per Order No   Other Precautions Cognitive;Chair Alarm;Bed Alarm;Multiple lines;Fall Risk;Pain  (arteaga catheter, epidural)   Home Living   Type of Home Apartment   Home Layout One level;Performs ADLs on one level;Able to live on main level with bedroom/bathroom  (3rd floor apartment w/ elevator access vs 3 FFSTE (due to elevator often being out-of-order))   Bathroom Shower/Tub Tub/shower unit   Bathroom Toilet Standard   Bathroom Equipment Shower chair   Home Equipment Cane  (did not use PTA)   Prior Function   Level of [place] Independent with ADLs;Independent  with functional mobility;Needs assistance with IADLS   Lives With Son;Daughter   Receives Help From Family   IADLs Family/Friend/Other provides transportation;Family/Friend/Other provides meals;Family/Friend/Other provides medication management   Falls in the last 6 months   (Pt reports less than five falls; unable to specify number.)   Vocational Retired   Lifestyle   Autonomy Pt reports I w/ ADLs and fxnl mobility w/o AD and requires A w/ IADLs at baseline; - driving.   Reciprocal Relationships Pt lives w/ her son and daughter.   Service to Others Pt is retired.   Intrinsic Gratification Pt enjoys watching TV.   General   Family/Caregiver Present Yes  (daughter present for evaluation)   ADL   Where Assessed Edge of bed   Eating Assistance 7  Independent   Grooming Assistance 5  Supervision/Setup   UB Bathing Assistance 5  Supervision/Setup   LB Bathing Assistance 3  Moderate Assistance   UB Dressing Assistance 5  Supervision/Setup   LB Dressing Assistance 3  Moderate Assistance   Toileting Assistance  3  Moderate Assistance   Bed Mobility   Supine to Sit 3  Moderate assistance   Additional items Assist x 1;HOB elevated;Bedrails;Increased time required;Verbal cues;LE management   Sit to Supine Unable to assess   Additional Comments Pt seated OOB in chair at end of OT tx session w/ all needs within reach and alarm activated.   Transfers   Sit to Stand 3  Moderate assistance   Additional items Assist x 2;Increased time required;Verbal cues   Stand to Sit 3  Moderate assistance   Additional items Assist x 2;Increased time required;Verbal cues   Additional Comments w/ RW for support   Functional Mobility   Functional Mobility 3  Moderate assistance   Additional Comments Pt took few steps from EOB to chair w/ mod Ax2 and w/ RW for support.   Additional items Rolling walker   Balance   Static Sitting Fair   Dynamic Sitting Fair -   Static Standing Poor +   Dynamic Standing Poor   Ambulatory Poor -   Activity Tolerance    Activity Tolerance Patient limited by pain;Patient limited by fatigue   Medical Staff Made Aware PTHarika, due to pt's medical complexity and multiple comorbidities   Nurse Made Aware RN clearance prior to session   RUE Assessment   RUE Assessment WFL   LUE Assessment   LUE Assessment WFL   Hand Function   Gross Motor Coordination Functional   Fine Motor Coordination Functional   Cognition   Arousal/Participation Cooperative;Lethargic   Attention Attends with cues to redirect   Orientation Level Oriented X4   Memory Decreased recall of precautions   Following Commands Follows one step commands with increased time or repetition   Comments Pt was cooperative and willing to participate in OT evaluation but also demonstrated increased lethargy. Pt can be self-limiting at times; required + time to participate in fxnl tasks.   Assessment   Limitation Decreased ADL status;Decreased endurance;Decreased self-care trans;Decreased high-level ADLs   Assessment Pt is a 69 y.o. female seen for OT evaluation s/p admission to [facility] on 3/12/2024. Pt diagnosed with metastatic colon cancer to liver; s/p robotic right hemicolectomy on 3/12. Pt has a significant PMH impacting occupational performance including: Anemia, Arthritis, Body mass index (BMI) 40.0-44.9, adult (HCC), Breast cancer (HCC), Cancer (HCC), Cervical lymphadenopathy, Encounter for screening for HIV, Follow-up examination, GERD (gastroesophageal reflux disease), Hyperlipidemia, Hypertension, Obesity, Stroke (HCC), Stroke (HCC), Transaminitis, and Vasomotor rhinitis. Pt with active OT evaluation and treatment orders and activity orders. PTA, pt living with her son and daughter in a 3rd floor apartment w/ 3 FFSTE vs elevator access (due to elevator often being out-out service). Pt agreeable and willing to participate in OT evaluation. During evaluation, pt was I for eating, S for grooming and UB ADLs, and mod A for LB ADLs and toileting. Pt also  required mod Ax1 for bed mobility and mod Ax2 for transfers, and fxnl mobility w/ RW for support. Pt required + time to participate in fxnl tasks due to + pain and fatigue. Performance deficits that affect the pt’s occupational performance during the initial evaluation include decreased ADL status, decreased activity tolerance, decreased endurance, decreased sitting tolerance, decreased sitting balance, decreased standing tolerance, decreased standing balance, decreased transfer skills, decreased fxnl mobility, and pain. Based on pt’s functional performance and deficits the following occupations will be addressed in OT treatments in order to maximize pt’s independence and overall occupational performance: grooming, bathing/showering, toileting and toilet hygiene, dressing, and functional mobility. Goals are listed below.  From OT perspective, recommend d/c to Post Acute Rehabilitation when pt medically stable to d/c from acute care. Will continue to follow.   Goals   Patient Goals to rest   LTG Time Frame 10-14   Plan   Treatment Interventions ADL retraining;Functional transfer training;Endurance training;Patient/family training;Equipment evaluation/education;Compensatory technique education;Continued evaluation;Energy conservation;Activityengagement   Goal Expiration Date 03/27/24   OT Treatment Day 0   OT Frequency 2-3x/wk   Discharge Recommendation   Rehab Resource Intensity Level, OT II (Moderate Resource Intensity)   AM-PAC Daily Activity Inpatient   Lower Body Dressing 2   Bathing 3   Toileting 2   Upper Body Dressing 3   Grooming 3   Eating 4   Daily Activity Raw Score 17   Daily Activity Standardized Score (Calc for Raw Score >=11) 37.26   AM-PAC Applied Cognition Inpatient   Following a Speech/Presentation 3   Understanding Ordinary Conversation 4   Taking Medications 3   Remembering Where Things Are Placed or Put Away 4   Remembering List of 4-5 Errands 3   Taking Care of Complicated Tasks 3   Applied  Cognition Raw Score 20   Applied Cognition Standardized Score 41.76       The patient's raw score on the AM-PAC Daily Activity Inpatient Short Form is 17. A raw score of less than 19 suggests the patient may benefit from discharge to post-acute rehabilitation services. Please refer to the recommendation of the Occupational Therapist for safe discharge planning.    Goals:      - Pt will complete UB ADLs w/ mod I to maximize independence and return home.    - Pt will complete LB ADLs w/ S to maximize independence and return home.     - Pt will complete toileting routine (transfers, hygiene, and clothing management) w/ S to maximize independence and return to prior level of function.    - Pt will complete bed mobility supine >< sit w/ mod I to maximize independence and return home.    - Pt will transfer to bed, chair, and toilet w/ S using AD / DME as needed to maximize independence and reduce burden of care.     - Pt will ambulate household distances w/ S using least restrictive device to maximize independence and return home.     - Pt will increase activity tolerance and sitting tolerance by eating all meals OOB in the chair.     - Pt will increase standing tolerance to 5-7 minutes to maximize independence w/ grooming tasks standing at the sink.     - Pt will tolerate therapeutic activities for greater than 30 minutes in order to increase tolerance for functional activities.     - Pt will participate in ongoing OT evaluation of cognitive skills to assist with safe d/c planning/recommendations.      Comfort Samuel MS, OTR/L

## 2024-03-14 LAB
ABO GROUP BLD: NORMAL
ANION GAP SERPL CALCULATED.3IONS-SCNC: 9 MMOL/L (ref 4–13)
BASOPHILS # BLD AUTO: 0.01 THOUSANDS/ÂΜL (ref 0–0.1)
BASOPHILS # BLD AUTO: 0.02 THOUSANDS/ÂΜL (ref 0–0.1)
BASOPHILS NFR BLD AUTO: 0 % (ref 0–1)
BASOPHILS NFR BLD AUTO: 0 % (ref 0–1)
BLD GP AB SCN SERPL QL: NEGATIVE
BUN SERPL-MCNC: 15 MG/DL (ref 5–25)
CALCIUM SERPL-MCNC: 7.8 MG/DL (ref 8.4–10.2)
CHLORIDE SERPL-SCNC: 110 MMOL/L (ref 96–108)
CO2 SERPL-SCNC: 26 MMOL/L (ref 21–32)
CREAT SERPL-MCNC: 1.42 MG/DL (ref 0.6–1.3)
EOSINOPHIL # BLD AUTO: 0.01 THOUSAND/ÂΜL (ref 0–0.61)
EOSINOPHIL # BLD AUTO: 0.01 THOUSAND/ÂΜL (ref 0–0.61)
EOSINOPHIL NFR BLD AUTO: 0 % (ref 0–6)
EOSINOPHIL NFR BLD AUTO: 0 % (ref 0–6)
ERYTHROCYTE [DISTWIDTH] IN BLOOD BY AUTOMATED COUNT: 16.5 % (ref 11.6–15.1)
ERYTHROCYTE [DISTWIDTH] IN BLOOD BY AUTOMATED COUNT: 16.8 % (ref 11.6–15.1)
ERYTHROCYTE [DISTWIDTH] IN BLOOD BY AUTOMATED COUNT: 16.9 % (ref 11.6–15.1)
GFR SERPL CREATININE-BSD FRML MDRD: 37 ML/MIN/1.73SQ M
GLUCOSE SERPL-MCNC: 114 MG/DL (ref 65–140)
HCT VFR BLD AUTO: 20.9 % (ref 34.8–46.1)
HCT VFR BLD AUTO: 21.7 % (ref 34.8–46.1)
HCT VFR BLD AUTO: 25.3 % (ref 34.8–46.1)
HGB BLD-MCNC: 6.7 G/DL (ref 11.5–15.4)
HGB BLD-MCNC: 6.8 G/DL (ref 11.5–15.4)
HGB BLD-MCNC: 8.6 G/DL (ref 11.5–15.4)
IMM GRANULOCYTES # BLD AUTO: 0.08 THOUSAND/UL (ref 0–0.2)
IMM GRANULOCYTES # BLD AUTO: 0.09 THOUSAND/UL (ref 0–0.2)
IMM GRANULOCYTES NFR BLD AUTO: 1 % (ref 0–2)
IMM GRANULOCYTES NFR BLD AUTO: 1 % (ref 0–2)
LYMPHOCYTES # BLD AUTO: 0.93 THOUSANDS/ÂΜL (ref 0.6–4.47)
LYMPHOCYTES # BLD AUTO: 1.02 THOUSANDS/ÂΜL (ref 0.6–4.47)
LYMPHOCYTES NFR BLD AUTO: 10 % (ref 14–44)
LYMPHOCYTES NFR BLD AUTO: 9 % (ref 14–44)
MAGNESIUM SERPL-MCNC: 2.3 MG/DL (ref 1.9–2.7)
MCH RBC QN AUTO: 29.8 PG (ref 26.8–34.3)
MCH RBC QN AUTO: 30 PG (ref 26.8–34.3)
MCH RBC QN AUTO: 30.7 PG (ref 26.8–34.3)
MCHC RBC AUTO-ENTMCNC: 31.3 G/DL (ref 31.4–37.4)
MCHC RBC AUTO-ENTMCNC: 32.1 G/DL (ref 31.4–37.4)
MCHC RBC AUTO-ENTMCNC: 34 G/DL (ref 31.4–37.4)
MCV RBC AUTO: 90 FL (ref 82–98)
MCV RBC AUTO: 94 FL (ref 82–98)
MCV RBC AUTO: 95 FL (ref 82–98)
MONOCYTES # BLD AUTO: 0.63 THOUSAND/ÂΜL (ref 0.17–1.22)
MONOCYTES # BLD AUTO: 0.64 THOUSAND/ÂΜL (ref 0.17–1.22)
MONOCYTES NFR BLD AUTO: 6 % (ref 4–12)
MONOCYTES NFR BLD AUTO: 6 % (ref 4–12)
NEUTROPHILS # BLD AUTO: 8.55 THOUSANDS/ÂΜL (ref 1.85–7.62)
NEUTROPHILS # BLD AUTO: 8.94 THOUSANDS/ÂΜL (ref 1.85–7.62)
NEUTS SEG NFR BLD AUTO: 83 % (ref 43–75)
NEUTS SEG NFR BLD AUTO: 84 % (ref 43–75)
NRBC BLD AUTO-RTO: 0 /100 WBCS
NRBC BLD AUTO-RTO: 0 /100 WBCS
PHOSPHATE SERPL-MCNC: 2.4 MG/DL (ref 2.3–4.1)
PLATELET # BLD AUTO: 101 THOUSANDS/UL (ref 149–390)
PLATELET # BLD AUTO: 112 THOUSANDS/UL (ref 149–390)
PLATELET # BLD AUTO: 139 THOUSANDS/UL (ref 149–390)
PMV BLD AUTO: 10.1 FL (ref 8.9–12.7)
PMV BLD AUTO: 10.7 FL (ref 8.9–12.7)
PMV BLD AUTO: 11 FL (ref 8.9–12.7)
POTASSIUM SERPL-SCNC: 3.5 MMOL/L (ref 3.5–5.3)
RBC # BLD AUTO: 2.23 MILLION/UL (ref 3.81–5.12)
RBC # BLD AUTO: 2.28 MILLION/UL (ref 3.81–5.12)
RBC # BLD AUTO: 2.8 MILLION/UL (ref 3.81–5.12)
RH BLD: POSITIVE
SODIUM SERPL-SCNC: 145 MMOL/L (ref 135–147)
SPECIMEN EXPIRATION DATE: NORMAL
WBC # BLD AUTO: 10.32 THOUSAND/UL (ref 4.31–10.16)
WBC # BLD AUTO: 10.61 THOUSAND/UL (ref 4.31–10.16)
WBC # BLD AUTO: 13.94 THOUSAND/UL (ref 4.31–10.16)

## 2024-03-14 PROCEDURE — 85027 COMPLETE CBC AUTOMATED: CPT | Performed by: PHYSICIAN ASSISTANT

## 2024-03-14 PROCEDURE — 80048 BASIC METABOLIC PNL TOTAL CA: CPT | Performed by: PHYSICIAN ASSISTANT

## 2024-03-14 PROCEDURE — 99233 SBSQ HOSP IP/OBS HIGH 50: CPT | Performed by: INTERNAL MEDICINE

## 2024-03-14 PROCEDURE — 97116 GAIT TRAINING THERAPY: CPT

## 2024-03-14 PROCEDURE — 99024 POSTOP FOLLOW-UP VISIT: CPT | Performed by: STUDENT IN AN ORGANIZED HEALTH CARE EDUCATION/TRAINING PROGRAM

## 2024-03-14 PROCEDURE — 86850 RBC ANTIBODY SCREEN: CPT | Performed by: PHYSICIAN ASSISTANT

## 2024-03-14 PROCEDURE — 83735 ASSAY OF MAGNESIUM: CPT | Performed by: PHYSICIAN ASSISTANT

## 2024-03-14 PROCEDURE — 86901 BLOOD TYPING SEROLOGIC RH(D): CPT | Performed by: PHYSICIAN ASSISTANT

## 2024-03-14 PROCEDURE — 86923 COMPATIBILITY TEST ELECTRIC: CPT

## 2024-03-14 PROCEDURE — 85025 COMPLETE CBC W/AUTO DIFF WBC: CPT | Performed by: PHYSICIAN ASSISTANT

## 2024-03-14 PROCEDURE — 97530 THERAPEUTIC ACTIVITIES: CPT

## 2024-03-14 PROCEDURE — 86900 BLOOD TYPING SEROLOGIC ABO: CPT | Performed by: PHYSICIAN ASSISTANT

## 2024-03-14 PROCEDURE — P9040 RBC LEUKOREDUCED IRRADIATED: HCPCS

## 2024-03-14 PROCEDURE — 84100 ASSAY OF PHOSPHORUS: CPT | Performed by: INTERNAL MEDICINE

## 2024-03-14 RX ORDER — POTASSIUM CHLORIDE 20 MEQ/1
40 TABLET, EXTENDED RELEASE ORAL ONCE
Status: COMPLETED | OUTPATIENT
Start: 2024-03-14 | End: 2024-03-14

## 2024-03-14 RX ORDER — CALCIUM CARBONATE 500 MG/1
1000 TABLET, CHEWABLE ORAL 2 TIMES DAILY PRN
Status: DISCONTINUED | OUTPATIENT
Start: 2024-03-14 | End: 2024-03-15 | Stop reason: HOSPADM

## 2024-03-14 RX ORDER — ACETAMINOPHEN 325 MG/1
650 TABLET ORAL ONCE
Status: DISCONTINUED | OUTPATIENT
Start: 2024-03-14 | End: 2024-03-15 | Stop reason: HOSPADM

## 2024-03-14 RX ORDER — POLYETHYLENE GLYCOL 3350 17 G/17G
17 POWDER, FOR SOLUTION ORAL DAILY
Status: DISCONTINUED | OUTPATIENT
Start: 2024-03-14 | End: 2024-03-15 | Stop reason: HOSPADM

## 2024-03-14 RX ORDER — DIPHENHYDRAMINE HYDROCHLORIDE 50 MG/ML
25 INJECTION INTRAMUSCULAR; INTRAVENOUS ONCE
Status: COMPLETED | OUTPATIENT
Start: 2024-03-14 | End: 2024-03-14

## 2024-03-14 RX ORDER — PANTOPRAZOLE SODIUM 40 MG/1
40 TABLET, DELAYED RELEASE ORAL
Status: DISCONTINUED | OUTPATIENT
Start: 2024-03-15 | End: 2024-03-15 | Stop reason: HOSPADM

## 2024-03-14 RX ADMIN — DIPHENHYDRAMINE HYDROCHLORIDE 25 MG: 50 INJECTION, SOLUTION INTRAMUSCULAR; INTRAVENOUS at 11:38

## 2024-03-14 RX ADMIN — MIDODRINE HYDROCHLORIDE 5 MG: 5 TABLET ORAL at 17:01

## 2024-03-14 RX ADMIN — CALCIUM CARBONATE (ANTACID) CHEW TAB 500 MG 1000 MG: 500 CHEW TAB at 15:16

## 2024-03-14 RX ADMIN — HEPARIN SODIUM 5000 UNITS: 5000 INJECTION INTRAVENOUS; SUBCUTANEOUS at 14:18

## 2024-03-14 RX ADMIN — ACETAMINOPHEN 975 MG: 325 TABLET, FILM COATED ORAL at 23:24

## 2024-03-14 RX ADMIN — SODIUM CHLORIDE, SODIUM GLUCONATE, SODIUM ACETATE, POTASSIUM CHLORIDE, MAGNESIUM CHLORIDE, SODIUM PHOSPHATE, DIBASIC, AND POTASSIUM PHOSPHATE 125 ML/HR: .53; .5; .37; .037; .03; .012; .00082 INJECTION, SOLUTION INTRAVENOUS at 05:47

## 2024-03-14 RX ADMIN — HEPARIN SODIUM 5000 UNITS: 5000 INJECTION INTRAVENOUS; SUBCUTANEOUS at 06:08

## 2024-03-14 RX ADMIN — ACETAMINOPHEN 975 MG: 325 TABLET, FILM COATED ORAL at 11:37

## 2024-03-14 RX ADMIN — ACETAMINOPHEN 975 MG: 325 TABLET, FILM COATED ORAL at 06:08

## 2024-03-14 RX ADMIN — GABAPENTIN 300 MG: 300 CAPSULE ORAL at 09:00

## 2024-03-14 RX ADMIN — POTASSIUM CHLORIDE 40 MEQ: 1500 TABLET, EXTENDED RELEASE ORAL at 12:23

## 2024-03-14 RX ADMIN — POLYETHYLENE GLYCOL 3350 17 G: 17 POWDER, FOR SOLUTION ORAL at 14:18

## 2024-03-14 RX ADMIN — HEPARIN SODIUM 5000 UNITS: 5000 INJECTION INTRAVENOUS; SUBCUTANEOUS at 21:43

## 2024-03-14 RX ADMIN — DOCUSATE SODIUM 100 MG: 100 CAPSULE, LIQUID FILLED ORAL at 09:00

## 2024-03-14 RX ADMIN — ACETAMINOPHEN 975 MG: 325 TABLET, FILM COATED ORAL at 00:43

## 2024-03-14 RX ADMIN — MIDODRINE HYDROCHLORIDE 5 MG: 5 TABLET ORAL at 06:08

## 2024-03-14 RX ADMIN — GABAPENTIN 300 MG: 300 CAPSULE ORAL at 21:43

## 2024-03-14 RX ADMIN — ATORVASTATIN CALCIUM 40 MG: 40 TABLET, FILM COATED ORAL at 21:43

## 2024-03-14 RX ADMIN — GABAPENTIN 300 MG: 300 CAPSULE ORAL at 17:01

## 2024-03-14 RX ADMIN — MIDODRINE HYDROCHLORIDE 5 MG: 5 TABLET ORAL at 11:38

## 2024-03-14 NOTE — PROGRESS NOTES
Follow up Consultation    Nephrology   Maira Moura 69 y.o. female MRN: [id number]  Unit/Bed#: [facility] 901-01 Encounter: [id number]      Physician Requesting Consult: Vickie Israel MD        ASSESSMENT:  69 y.o.  female with medical history of hypertension, obesity, GERD, CHF and CKD stage II/III A who was admitted for elective robotic right hemicolectomy for metastatic colon cancer on 3/12/2024. Nephrology has been consulted for perioperative optimization to reduce incidence for acute kidney injury.       Acute kidney injury on CKD stage II/III A:    GOGO most likely secondary to ischemic injury secondary to hemodynamic perturbations intraoperatively postoperatively plus use of NSAID on 3/13/2024 in light of underlying comorbidities.  After review of records it appears that the patient has a baseline Creatinine of 0.8-1.0 mg/dL.  Admission creatinine of 1.03 mg/dL on 3/12/2024 postoperatively.  He creatinine this hospitalization 1.42 mg/dL on 3/14/2024  Creatinine today is at 0.74 mg/dL, stable and improved to below baseline.  check BMP, magnesium, phosphorus in a.m.  Recommend hold further IV fluids for now   Place on a renal diet when allowed diet order.   Avoid Celebrex  Strict I/O.  Daily weights  Urinary retention protocol  Avoid nephrotoxins, adjust meds to appropriate GFR.  Likely has underlying CKD secondary to age-related nephron loss plus obesity hyperfiltration plus hypertensive nephrosclerosis  Outpatient for nephrology follows up with no nephrologist  Stable for discharge from renal center medically cleared recommend blood work on 3/26/2024 upon discharge     H&H/anemia:  most recent hemoglobin at 7.8 g/dL  Maintain hemoglobin greater than 8 grams/deciliter  Management per primary team.  Follow-up hemoglobin with primary team consider PRBC transfusion if continues to drop     Acid-base electrolytes:  Hyperphosphatemia most recent phosphorus down to 1.7 stable and improving will gentle supplement  "with K-Phos  Acid-base stable most recent bicarb 24, lactic acid improved down to 1.9, was as high as 4.5 yesterday  Hypokalemia most recent potassium 3.4 mEq will supplement with K-Phos        Blood pressure:   Optimize hemodynamic status to avoid delay in renal recovery.  Maintain MAP > 65mmHg  Avoid BP fluctuations.  Home medications: Losartan 50 mg p.o. daily, K-Dur 20 milliequivalents p.o. twice daily  Current medications: midodrine 5 mg p.o. 3 times daily hold for SBP greater than 110  Continue To monitor if does not need any further midodrine can DC     Other medical problems     History of metastatic colon cancer:  Management per primary team.  Status post robotic right hemicolectomy on 3/12/2024        Plan below is what was discussed with the primary team that they agree with:  check BMP, magnesium, phosphorus in a.m.  Recommend discontinuation IV fluids  Avoid Celebrex   hold home losartan for now  Increase to K-Dur 20 mill equivalents p.o. daily for today as patient is also getting K-Phos  Recommend wean off of midodrine if no longer needing it can DC order  Stable for discharge from renal standpoint medically cleared and discharged will need lab work on 3/26/2024  Thanks for the consult  Will continue to follow  Please call with questions/ concerns.      Ioana Curtis MD, [facility], 3/15/2024, 11:03 AM                Objective :   Patient seen and examined in her room no overnight events hemodynamic stable remains afebrile urine output 550 cc plus/24 hours.      PHYSICAL EXAM  /71   Pulse 105   Temp 99.7 °F (37.6 °C)   Resp (!) 24   Ht 5' 5\" (1.651 m)   Wt 97.5 kg (215 lb)   SpO2 92%   BMI 35.78 kg/m²   Temp (24hrs), Av.7 °F (37.1 °C), Min:97.6 °F (36.4 °C), Max:99.7 °F (37.6 °C)        Intake/Output Summary (Last 24 hours) at 3/15/2024 1103  Last data filed at 3/15/2024 1001  Gross per 24 hour   Intake 3983.31 ml   Output 550 ml   Net 3433.31 ml       I/O last 24 hours:  In: 4223.3 " [P.O.:1480; I.V.:2355.8; Blood:387.5]  Out: 550 [Urine:550]      Current Weight: Weight - Scale: 97.5 kg (215 lb)  First Weight: Weight - Scale: 97.1 kg (214 lb)  Physical Exam  Vitals and nursing note reviewed.   Constitutional:       General: She is not in acute distress.     Appearance: Normal appearance. She is obese. She is not ill-appearing, toxic-appearing or diaphoretic.   HENT:      Head: Normocephalic and atraumatic.      Mouth/Throat:      Mouth: Mucous membranes are moist.      Pharynx: No oropharyngeal exudate.   Eyes:      General: No scleral icterus.  Cardiovascular:      Rate and Rhythm: Normal rate.   Pulmonary:      Effort: No respiratory distress.      Breath sounds: Normal breath sounds. No stridor. No wheezing.   Abdominal:      General: There is no distension.      Palpations: Abdomen is soft.      Tenderness: There is no abdominal tenderness.   Musculoskeletal:         General: No swelling.      Cervical back: Normal range of motion. No rigidity.   Skin:     Coloration: Skin is not jaundiced.   Neurological:      General: No focal deficit present.      Mental Status: She is alert and oriented to person, place, and time.   Psychiatric:         Mood and Affect: Mood normal.         Behavior: Behavior normal.             Review of Systems   Constitutional:  Negative for chills and fever.   HENT:  Negative for congestion.    Respiratory:  Negative for cough, shortness of breath and wheezing.    Cardiovascular:  Negative for leg swelling.   Gastrointestinal:  Negative for abdominal pain.   Genitourinary:  Negative for flank pain.   Musculoskeletal:  Negative for back pain.   Neurological:  Negative for headaches.   Psychiatric/Behavioral:  Negative for agitation and confusion.    All other systems reviewed and are negative.      Scheduled Meds:  Current Facility-Administered Medications   Medication Dose Route Frequency Provider Last Rate    acetaminophen  650 mg Oral Once Lisy Hallman,  GARRETT      acetaminophen  975 mg Oral Q6H [facility] [place] Irby, GARRETT      atorvastatin  40 mg Oral HS Jazmin Simmons, DO      calcium carbonate  1,000 mg Oral BID PRN Lisy Hallman, GARRETT      docusate sodium  100 mg Oral BID [place] Irby, GARRETT      enoxaparin  40 mg Subcutaneous Q24H [facility] Lisy Hallman, GARRETT      gabapentin  300 mg Oral TID [place] Irby, GARRETT      HYDROmorphone  0.5 mg Intravenous Q3H PRN [place] Irby, GARRETT      midodrine  5 mg Oral TID AC Ioana Curtis MD      naloxone  0.04 mg Intravenous Q1MIN PRN Jazmin Simmons, DO      ondansetron  4 mg Intravenous Q6H PRN Jazmin Simmons, DO      oxyCODONE  5 mg Oral Q4H PRN [place] Irby, GARRETT      oxyCODONE  2.5 mg Oral Q4H PRN [place] Irby, GARRETT      pantoprazole  40 mg Oral Early Morning Lisy Hallman PA-C      polyethylene glycol  17 g Oral Daily Padilla Shankar PA-C      [START ON 3/16/2024] potassium chloride  20 mEq Oral Daily Ioana Curtis MD      potassium phosphate  30 mmol Intravenous Once Lisy Hallman PA-C         PRN Meds:.  calcium carbonate    HYDROmorphone    naloxone    ondansetron    oxyCODONE    oxyCODONE    Continuous Infusions:         Invasive Devices:     Invasive Devices       Central Venous Catheter Line  Duration             Port A Cath 07/28/23 Right Internal jugular 230 days              Peripheral Intravenous Line  Duration             Peripheral IV 03/13/24 Left;Proximal;Upper;Ventral (anterior) Arm 2 days                      LABORATORY:    Results from last 7 days   Lab Units 03/15/24  0439 03/14/24  1653 03/14/24  0655 03/14/24  0454 03/13/24  0646 03/13/24  0220 03/12/24  2033 03/12/24  1926 03/12/24  1855   WBC Thousand/uL 10.69* 13.94* 10.32* 10.61* 11.38*  --  8.17  --   --    HEMOGLOBIN g/dL 7.8* 8.6* 6.8* 6.7* 8.2* 9.4* 8.3*  --   --    I STAT HEMOGLOBIN g/dl  --   --   --   --   --   --   --  7.1* 9.5*   HEMATOCRIT % 23.7*  "25.3* 21.7* 20.9* 24.9* 29.6* 25.0*  --   --    HEMATOCRIT, ISTAT %  --   --   --   --   --   --   --  21* 28*   PLATELETS Thousands/uL 141* 139* 101* 112* 127*  --  143*  --   --    POTASSIUM mmol/L 3.4*  --   --  3.5 3.9  --  3.9  --   --    CHLORIDE mmol/L 108  --   --  110* 108  --  108  --   --    CO2 mmol/L 24  --   --  26 25  --  22  --   --    CO2, I-STAT mmol/L  --   --   --   --   --   --   --  26 24   BUN mg/dL 13  --   --  15 9  --  9  --   --    CREATININE mg/dL 0.74  --   --  1.42* 0.90  --  1.03  --   --    CALCIUM mg/dL 7.8*  --   --  7.8* 8.5  --  9.7  --   --    MAGNESIUM mg/dL 2.1  --   --  2.3 1.3*  --  1.4*  --   --    PHOSPHORUS mg/dL 1.7*  --   --  2.4 3.1  --  5.3*  --   --    GLUCOSE, ISTAT mg/dl  --   --   --   --   --   --   --  209* 204*      rest all reviewed    RADIOLOGY:  CT head wo contrast   Final Result by Drew Kim DO (03/13 6027)   No acute intracranial abnormality.      Chronic pansinusitis. Bilateral mild mastoiditis.            Workstation performed: [id number]           Rest all reviewed    Portions of the record may have been created with voice recognition software. Occasional wrong word or \"sound a like\" substitutions may have occurred due to the inherent limitations of voice recognition software. Read the chart carefully and recognize, using context, where substitutions have occurred.If you have any questions, please contact the dictating provider.    "

## 2024-03-14 NOTE — PROGRESS NOTES
"Progress Note - Surgical Oncology   Maira Moura 69 y.o. female MRN: [id number]  Unit/Bed#: [facility] 901-01 Encounter: [id number]    Assessment:  61-year-old female who underwent robotic right hemicolectomy for metastatic colon cancer.    Plan:  -Add CTC  -Transition to MIVF  -Continue PCA D  - discontinue Pope  -Follow-up a.m. labs  -Wean supplemental O2  -.  Nephro consult  -DVT prophylaxis  -Encourage out of bed ambulation  -Encourage I-S use    Subjective/Objective     Subjective: Patient appears more awake this morning and conversational, although partially somnolent still.  Patient reports that she is tolerated her clears patient needs to eat more.  Reports no pain.  No nausea no vomiting.  No flatus or bowel movement at this time.    Objective: Afebrile, tach into the 110s, hemodynamically normal on room air    Blood pressure 100/56, pulse 93, temperature 97.9 °F (36.6 °C), temperature source Oral, resp. rate 18, height 5' 5\" (1.651 m), weight 97.5 kg (215 lb), SpO2 95%, not currently breastfeeding.,Body mass index is 35.78 kg/m².    UOP: 1.1 L (800 cc overnight)    Invasive Devices       Central Venous Catheter Line  Duration             Port A Cath 07/28/23 Right Internal jugular 229 days              Peripheral Intravenous Line  Duration             Peripheral IV 03/12/24 Left Antecubital 1 day    Peripheral IV 03/12/24 Left Hand 1 day    Peripheral IV 03/13/24 Left;Proximal;Upper;Ventral (anterior) Arm 1 day              Drain  Duration             Urethral Catheter Double-lumen;Latex 16 Fr. 1 day                    Physical Exam:  General: No acute distress, improving alertness, responsive to voice follows commands answers questions appropriately, conversational this morning  CV: RRR  Lungs: Normal work of breathing on 3 L nasal cannula satting appropriately  Abdomen: Soft postsurgically tender along lower Pfannenstiel incision, nondistended  Incision/s clean dry and intact with Exofin  Skin: Warm, " dry    Lab, Imaging and other studies:  Recent Labs     03/12/24 2033 03/13/24  0220 03/13/24  0646   WBC 8.17  --  11.38*   HGB 8.3* 9.4* 8.2*   *  --  127*   SODIUM 139  --  139   K 3.9  --  3.9     --  108   CO2 22  --  25   BUN 9  --  9   CREATININE 1.03  --  0.90   GLUC 205*  --  141*   CALCIUM 9.7  --  8.5   MG 1.4*  --  1.3*   PHOS 5.3*  --  3.1   AST  --   --  47*   ALT  --   --  27   ALKPHOS  --   --  68   TBILI  --   --  0.58     VTE Pharmacologic Prophylaxis: Heparin  VTE Mechanical Prophylaxis: sequential compression device

## 2024-03-14 NOTE — PLAN OF CARE
Problem: PAIN - ADULT  Goal: Verbalizes/displays adequate comfort level or baseline comfort level  Description: Interventions:  - Encourage patient to monitor pain and request assistance  - Assess pain using appropriate pain scale  - Administer analgesics based on type and severity of pain and evaluate response  - Implement non-pharmacological measures as appropriate and evaluate response  - Consider cultural and social influences on pain and pain management  - Notify physician/advanced practitioner if interventions unsuccessful or patient reports new pain  Outcome: Progressing     Problem: INFECTION - ADULT  Goal: Absence or prevention of progression during hospitalization  Description: INTERVENTIONS:  - Assess and monitor for signs and symptoms of infection  - Monitor lab/diagnostic results  - Monitor all insertion sites, i.e. indwelling lines, tubes, and drains  - Monitor endotracheal if appropriate and nasal secretions for changes in amount and color  - [place] appropriate cooling/warming therapies per order  - Administer medications as ordered  - Instruct and encourage patient and family to use good hand hygiene technique  - Identify and instruct in appropriate isolation precautions for identified infection/condition  Outcome: Progressing  Goal: Absence of fever/infection during neutropenic period  Description: INTERVENTIONS:  - Monitor WBC    Outcome: Progressing     Problem: SAFETY ADULT  Goal: Patient will remain free of falls  Description: INTERVENTIONS:  - Educate patient/family on patient safety including physical limitations  - Instruct patient to call for assistance with activity   - Consult OT/PT to assist with strengthening/mobility   - Keep Call bell within reach  - Keep bed low and locked with side rails adjusted as appropriate  - Keep care items and personal belongings within reach  - Initiate and maintain comfort rounds  - Make Fall Risk Sign visible to staff  - Offer Toileting every 2 Hours,  in advance of need  - Initiate/Maintain alarm  - Obtain necessary fall risk management equipment:   - Apply yellow socks and bracelet for high fall risk patients  - Consider moving patient to room near nurses station  Outcome: Progressing  Goal: Maintain or return to baseline ADL function  Description: INTERVENTIONS:  -  Assess patient's ability to carry out ADLs; assess patient's baseline for ADL function and identify physical deficits which impact ability to perform ADLs (bathing, care of mouth/teeth, toileting, grooming, dressing, etc.)  - Assess/evaluate cause of self-care deficits   - Assess range of motion  - Assess patient's mobility; develop plan if impaired  - Assess patient's need for assistive devices and provide as appropriate  - Encourage maximum independence but intervene and supervise when necessary  - Involve family in performance of ADLs  - Assess for home care needs following discharge   - Consider OT consult to assist with ADL evaluation and planning for discharge  - Provide patient education as appropriate  Outcome: Progressing  Goal: Maintains/Returns to pre admission functional level  Description: INTERVENTIONS:  - Perform AM-PAC 6 Click Basic Mobility/ Daily Activity assessment daily.  - Set and communicate daily mobility goal to care team and patient/family/caregiver.   - Collaborate with rehabilitation services on mobility goals if consulted  Outcome: Progressing     Problem: DISCHARGE PLANNING  Goal: Discharge to home or other facility with appropriate resources  Description: INTERVENTIONS:  - Identify barriers to discharge w/patient and caregiver  - Arrange for needed discharge resources and transportation as appropriate  - Identify discharge learning needs (meds, wound care, etc.)  - Arrange for interpretive services to assist at discharge as needed  - Refer to Case Management Department for coordinating discharge planning if the patient needs post-hospital services based on  physician/advanced practitioner order or complex needs related to functional status, cognitive ability, or social support system  Outcome: Progressing     Problem: Knowledge Deficit  Goal: Patient/family/caregiver demonstrates understanding of disease process, treatment plan, medications, and discharge instructions  Description: Complete learning assessment and assess knowledge base.  Interventions:  - Provide teaching at level of understanding  - Provide teaching via preferred learning methods  Outcome: Progressing

## 2024-03-14 NOTE — CASE MANAGEMENT
Case Management Discharge Planning Note    Patient name Maira Moura  Location [facility] 901/[facility] 901-01 MRN [id number]  : 1955 Date 3/14/2024       Current Admission Date: 3/12/2024  Current Admission Diagnosis:Metastatic colon cancer to liver (HCC)   Patient Active Problem List    Diagnosis Date Noted    Diastolic dysfunction 2024    Neuropathy 2024    Platelets decreased (HCC) 2023    Stage 3 chronic kidney disease, unspecified whether stage 3a or 3b CKD (HCC) 2023    Chemotherapy induced neutropenia  2023    Dehydration 10/06/2023    Drug-induced constipation 10/06/2023    Anemia 2023    Poor appetite 2023    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HCC) 2023    Metastatic colon cancer to liver (HCC) 2023    GERD (gastroesophageal reflux disease)     Obesity     Right thyroid nodule 2023    Prediabetes 2022    Vitamin D deficiency 2019    Malignant neoplasm of right female breast (HCC) 2018    Mixed hyperlipidemia 2018    Primary hypertension 2017      LOS (days): 2  Geometric Mean LOS (GMLOS) (days): 5.1  Days to GMLOS:3.5     OBJECTIVE:  Risk of Unplanned Readmission Score: 23.35         Current admission status: Inpatient   Preferred Pharmacy:    [facility] [place] [address]  Phone: [phone] Fax: [phone]    Primary Care Provider: Fanta Turner MD    Primary Insurance: MEDICARE  Secondary Insurance: [facility]    DISCHARGE DETAILS:    Discharge planning discussed with:: Pt and pt's daughter Marleny  Freedom of Choice: Yes  Comments - Freedom of Choice: Pt and pt's daughter agreeable to blanket STR referrals  CM contacted family/caregiver?: Yes  Were Treatment Team discharge recommendations reviewed with patient/caregiver?: Yes  Did patient/caregiver verbalize understanding of patient care needs?:  N/A- going to facility  Were patient/caregiver advised of the risks associated with not following Treatment Team discharge recommendations?: Yes    Contacts  Patient Contacts: Marleny Moura-daughter  Relationship to Patient:: Family  Contact Method: In Person  Reason/Outcome: Continuity of Care, Emergency Contact, Referral, Discharge Planning    Requested Home Health Care         Is the patient interested in HHC at discharge?: No    DME Referral Provided  Referral made for DME?: No    Other Referral/Resources/Interventions Provided:  Interventions: Short Term Rehab  Referral Comments: CM completed blanket STR referrals via [facility]         Treatment Team Recommendation: Short Term Rehab  Discharge Destination Plan:: Short Term Rehab

## 2024-03-14 NOTE — PLAN OF CARE
Problem: INFECTION - ADULT  Goal: Absence or prevention of progression during hospitalization  Description: INTERVENTIONS:  - Assess and monitor for signs and symptoms of infection  - Monitor lab/diagnostic results  - Monitor all insertion sites, i.e. indwelling lines, tubes, and drains  - Monitor endotracheal if appropriate and nasal secretions for changes in amount and color  - [place] appropriate cooling/warming therapies per order  - Administer medications as ordered  - Instruct and encourage patient and family to use good hand hygiene technique  - Identify and instruct in appropriate isolation precautions for identified infection/condition  Outcome: Progressing     Problem: SAFETY ADULT  Goal: Maintain or return to baseline ADL function  Description: INTERVENTIONS:  -  Assess patient's ability to carry out ADLs; assess patient's baseline for ADL function and identify physical deficits which impact ability to perform ADLs (bathing, care of mouth/teeth, toileting, grooming, dressing, etc.)  - Assess/evaluate cause of self-care deficits   - Assess range of motion  - Assess patient's mobility; develop plan if impaired  - Assess patient's need for assistive devices and provide as appropriate  - Encourage maximum independence but intervene and supervise when necessary  - Involve family in performance of ADLs  - Assess for home care needs following discharge   - Consider OT consult to assist with ADL evaluation and planning for discharge  - Provide patient education as appropriate  Outcome: Progressing     Problem: Prexisting or High Potential for Compromised Skin Integrity  Goal: Skin integrity is maintained or improved  Description: INTERVENTIONS:  - Identify patients at risk for skin breakdown  - Assess and monitor skin integrity  - Assess and monitor nutrition and hydration status  - Monitor labs   - Assess for incontinence   - Turn and reposition patient  - Assist with mobility/ambulation  - Relieve pressure  over bony prominences  - Avoid friction and shearing  - Provide appropriate hygiene as needed including keeping skin clean and dry  - Evaluate need for skin moisturizer/barrier cream  - Collaborate with interdisciplinary team   - Patient/family teaching  - Consider wound care consult   Outcome: Progressing     Problem: Nutrition/Hydration-ADULT  Goal: Nutrient/Hydration intake appropriate for improving, restoring or maintaining nutritional needs  Description: Monitor and assess patient's nutrition/hydration status for malnutrition. Collaborate with interdisciplinary team and initiate plan and interventions as ordered.  Monitor patient's weight and dietary intake as ordered or per policy. Utilize nutrition screening tool and intervene as necessary. Determine patient's food preferences and provide high-protein, high-caloric foods as appropriate.     INTERVENTIONS:  - Monitor oral intake, urinary output, labs, and treatment plans  - Assess nutrition and hydration status and recommend course of action  - Evaluate amount of meals eaten  - Assist patient with eating if necessary   - Allow adequate time for meals  - Recommend/ encourage appropriate diets, oral nutritional supplements, and vitamin/mineral supplements  - Order, calculate, and assess calorie counts as needed  - Recommend, monitor, and adjust tube feedings and TPN/PPN based on assessed needs  - Assess need for intravenous fluids  - Provide specific nutrition/hydration education as appropriate  - Include patient/family/caregiver in decisions related to nutrition  Outcome: Progressing

## 2024-03-14 NOTE — PLAN OF CARE
Problem: PHYSICAL THERAPY ADULT  Goal: Performs mobility at highest level of function for planned discharge setting.  See evaluation for individualized goals.  Description: Treatment/Interventions: Functional transfer training, LE strengthening/ROM, Elevations, Therapeutic exercise, Endurance training, Patient/family training, Equipment eval/education, Bed mobility, Gait training, Spoke to nursing, Spoke to case management, OT  Equipment Recommended: Walker       See flowsheet documentation for full assessment, interventions and recommendations.  Outcome: Progressing  Note: Prognosis: Good  Problem List: Decreased strength, Decreased endurance, Impaired balance, Decreased mobility, Pain  Assessment: Pt seen for PT treatment session this date. Therapy session focused on bed mobility, functional transfers, gait training and endurance training in order to improve overall mobility and independence. Pt requires assist of 1 + SBA of another for chair follow/line management. Pt making steady progress toward goals as demonstrated by ambulating increased distances with decreased assist level. Requires cues for RW management as well as sequencing- recommend chair follow. Pt was left sitting OOB in recliner at the end of PT session with all needs in reach. Pt would benefit from continued PT services while in hospital to address remaining limitations. PT to continue to follow pt and recommends STR. The patient's AM-PAC Basic Mobility Inpatient Short Form Raw Score is 13. A Raw score of less than or equal to 16 suggests the patient may benefit from discharge to post-acute rehabilitation services. Please also refer to the recommendation of the Physical Therapist for safe discharge planning.  Barriers to Discharge: Inaccessible home environment     Rehab Resource Intensity Level, PT: II (Moderate Resource Intensity)    See flowsheet documentation for full assessment.

## 2024-03-14 NOTE — PHYSICAL THERAPY NOTE
PHYSICAL THERAPY NOTE          Patient Name: Maira Moura  Today's Date: 3/14/2024       03/14/24 1040   PT Last Visit   PT Visit Date 03/14/24   Note Type   Note Type Treatment   Pain Assessment   Pain Assessment Tool 0-10   Pain Score No Pain   Restrictions/Precautions   Weight Bearing Precautions Per Order No   Other Precautions Cognitive;Chair Alarm;Bed Alarm;Multiple lines;Fall Risk;Pain   General   Chart Reviewed Yes   Response to Previous Treatment Patient with no complaints from previous session.   Family/Caregiver Present Yes   Cognition   Arousal/Participation Alert;Cooperative   Attention Attends with cues to redirect   Memory Decreased recall of precautions   Following Commands Follows one step commands with increased time or repetition   Comments pleasant and cooperative   Bed Mobility   Supine to Sit 3  Moderate assistance   Additional items Assist x 1;HOB elevated;Bedrails;Increased time required   Sit to Supine Unable to assess   Additional Comments pt left sitting OOB in recliner with all needs within reach   Transfers   Sit to Stand 3  Moderate assistance   Additional items Assist x 1;Armrests;Increased time required;Verbal cues   Stand to Sit 3  Moderate assistance   Additional items Assist x 1;Armrests;Increased time required;Verbal cues   Additional Comments transfers with RW; performed 2x STS t/o session   Ambulation/Elevation   Gait pattern Excessively slow;Short stride;Foward flexed;Decreased foot clearance;Improper Weight shift;Wide RERE   Gait Assistance 3  Moderate assist   Additional items Assist x 1;Verbal cues;Tactile cues   Assistive Device Rolling walker   Distance 4', 25'  (+ close chair follow)   Balance   Static Sitting Fair   Dynamic Sitting Fair -   Static Standing Poor +   Dynamic Standing Poor   Ambulatory Poor  (RW)   Endurance Deficit   Endurance Deficit Yes   Endurance Deficit Description  decreased exercise tolerance, generalized weakness + deconditioning   Activity Tolerance   Activity Tolerance Patient limited by fatigue;Patient limited by pain   Medical Staff Made Aware rehab aide Umair   Nurse Made Aware RN cleared pt participate in therapy session   Assessment   Prognosis Good   Problem List Decreased strength;Decreased endurance;Impaired balance;Decreased mobility;Pain   Assessment Pt seen for PT treatment session this date. Therapy session focused on bed mobility, functional transfers, gait training and endurance training in order to improve overall mobility and independence. Pt requires assist of 1 + SBA of another for chair follow/line management. Pt making steady progress toward goals as demonstrated by ambulating increased distances with decreased assist level. Requires cues for RW management as well as sequencing- recommend chair follow. Pt was left sitting OOB in recliner at the end of PT session with all needs in reach. Pt would benefit from continued PT services while in hospital to address remaining limitations. PT to continue to follow pt and recommends STR. The patient's AM-PAC Basic Mobility Inpatient Short Form Raw Score is 13. A Raw score of less than or equal to 16 suggests the patient may benefit from discharge to post-acute rehabilitation services. Please also refer to the recommendation of the Physical Therapist for safe discharge planning.   Barriers to Discharge Inaccessible home environment   Goals   Patient Goals to walk   STG Expiration Date 03/27/24   PT Treatment Day 1   Plan   Treatment/Interventions Functional transfer training;LE strengthening/ROM;Elevations;Therapeutic exercise;Endurance training;Patient/family training;Equipment eval/education;Bed mobility;Gait training;Spoke to nursing;Spoke to case management;OT   Progress Progressing toward goals   PT Frequency 3-5x/wk   Discharge Recommendation   Rehab Resource Intensity Level, PT II (Moderate Resource  Intensity)   Equipment Recommended Walker   Walker Package Recommended Wheeled walker   AM-PAC Basic Mobility Inpatient   Turning in Flat Bed Without Bedrails 3   Lying on Back to Sitting on Edge of Flat Bed Without Bedrails 2   Moving Bed to Chair 2   Standing Up From Chair Using Arms 2   Walk in Room 2   Climb 3-5 Stairs With Railing 2   Basic Mobility Inpatient Raw Score 13   Basic Mobility Standardized Score 33.99   Highest Level Of Mobility   JH-HLM Goal 4: Move to chair/commode   JH-HLM Achieved 7: Walk 25 feet or more   Education   Education Provided Mobility training;Assistive device   Patient Demonstrates acceptance/verbal understanding   End of Consult   Patient Position at End of Consult Bedside chair;All needs within reach     Harika Thayer, PT, DPT

## 2024-03-15 ENCOUNTER — NURSING HOME VISIT (OUTPATIENT)
Dept: GERIATRICS | Facility: OTHER | Age: 69
End: 2024-03-15
Payer: MEDICARE

## 2024-03-15 VITALS
OXYGEN SATURATION: 92 % | SYSTOLIC BLOOD PRESSURE: 124 MMHG | DIASTOLIC BLOOD PRESSURE: 71 MMHG | HEART RATE: 105 BPM | HEIGHT: 65 IN | TEMPERATURE: 99.7 F | RESPIRATION RATE: 24 BRPM | WEIGHT: 215 LBS | BODY MASS INDEX: 35.82 KG/M2

## 2024-03-15 DIAGNOSIS — Z17.0 MALIGNANT NEOPLASM OF UPPER-OUTER QUADRANT OF RIGHT BREAST IN FEMALE, ESTROGEN RECEPTOR POSITIVE: ICD-10-CM

## 2024-03-15 DIAGNOSIS — D63.1 ANEMIA DUE TO STAGE 3A CHRONIC KIDNEY DISEASE: ICD-10-CM

## 2024-03-15 DIAGNOSIS — Z86.73 HISTORY OF CVA (CEREBROVASCULAR ACCIDENT): ICD-10-CM

## 2024-03-15 DIAGNOSIS — G89.18 ACUTE POST-OPERATIVE PAIN: ICD-10-CM

## 2024-03-15 DIAGNOSIS — C78.7 METASTATIC COLON CANCER TO LIVER (HCC): Primary | ICD-10-CM

## 2024-03-15 DIAGNOSIS — E78.2 MIXED HYPERLIPIDEMIA: ICD-10-CM

## 2024-03-15 DIAGNOSIS — D69.6 THROMBOCYTOPENIA (HCC): ICD-10-CM

## 2024-03-15 DIAGNOSIS — I10 PRIMARY HYPERTENSION: ICD-10-CM

## 2024-03-15 DIAGNOSIS — Z91.89 AT RISK FOR CONSTIPATION: ICD-10-CM

## 2024-03-15 DIAGNOSIS — E66.09 CLASS 2 OBESITY DUE TO EXCESS CALORIES WITHOUT SERIOUS COMORBIDITY IN ADULT, UNSPECIFIED BMI: ICD-10-CM

## 2024-03-15 DIAGNOSIS — N17.9 AKI (ACUTE KIDNEY INJURY) (HCC): ICD-10-CM

## 2024-03-15 DIAGNOSIS — C18.9 METASTATIC COLON CANCER TO LIVER (HCC): Primary | ICD-10-CM

## 2024-03-15 DIAGNOSIS — R53.81 PHYSICAL DECONDITIONING: ICD-10-CM

## 2024-03-15 DIAGNOSIS — Z91.89 AT RISK FOR DELIRIUM: ICD-10-CM

## 2024-03-15 DIAGNOSIS — R73.03 PREDIABETES: ICD-10-CM

## 2024-03-15 DIAGNOSIS — Z71.89 ADVANCE CARE PLANNING: ICD-10-CM

## 2024-03-15 DIAGNOSIS — C50.411 MALIGNANT NEOPLASM OF UPPER-OUTER QUADRANT OF RIGHT BREAST IN FEMALE, ESTROGEN RECEPTOR POSITIVE: ICD-10-CM

## 2024-03-15 DIAGNOSIS — I51.89 DIASTOLIC DYSFUNCTION: ICD-10-CM

## 2024-03-15 DIAGNOSIS — K21.9 GASTROESOPHAGEAL REFLUX DISEASE, UNSPECIFIED WHETHER ESOPHAGITIS PRESENT: ICD-10-CM

## 2024-03-15 DIAGNOSIS — D72.823 LEUKEMOID REACTION: ICD-10-CM

## 2024-03-15 DIAGNOSIS — C09.9 RIGHT TONSILLAR SQUAMOUS CELL CARCINOMA (HCC): ICD-10-CM

## 2024-03-15 DIAGNOSIS — N18.31 ANEMIA DUE TO STAGE 3A CHRONIC KIDNEY DISEASE: ICD-10-CM

## 2024-03-15 DIAGNOSIS — E87.6 HYPOKALEMIA: ICD-10-CM

## 2024-03-15 DIAGNOSIS — N18.31 STAGE 3A CHRONIC KIDNEY DISEASE (HCC): ICD-10-CM

## 2024-03-15 LAB
ABO GROUP BLD BPU: NORMAL
ANION GAP SERPL CALCULATED.3IONS-SCNC: 8 MMOL/L (ref 4–13)
BASOPHILS # BLD AUTO: 0.02 THOUSANDS/ÂΜL (ref 0–0.1)
BASOPHILS NFR BLD AUTO: 0 % (ref 0–1)
BPU ID: NORMAL
BUN SERPL-MCNC: 13 MG/DL (ref 5–25)
CALCIUM SERPL-MCNC: 7.8 MG/DL (ref 8.4–10.2)
CHLORIDE SERPL-SCNC: 108 MMOL/L (ref 96–108)
CO2 SERPL-SCNC: 24 MMOL/L (ref 21–32)
CREAT SERPL-MCNC: 0.74 MG/DL (ref 0.6–1.3)
CROSSMATCH: NORMAL
EOSINOPHIL # BLD AUTO: 0.14 THOUSAND/ÂΜL (ref 0–0.61)
EOSINOPHIL NFR BLD AUTO: 1 % (ref 0–6)
ERYTHROCYTE [DISTWIDTH] IN BLOOD BY AUTOMATED COUNT: 17.2 % (ref 11.6–15.1)
FLUAV RNA RESP QL NAA+PROBE: NEGATIVE
FLUBV RNA RESP QL NAA+PROBE: NEGATIVE
GFR SERPL CREATININE-BSD FRML MDRD: 83 ML/MIN/1.73SQ M
GLUCOSE SERPL-MCNC: 78 MG/DL (ref 65–140)
HCT VFR BLD AUTO: 23.7 % (ref 34.8–46.1)
HGB BLD-MCNC: 7.8 G/DL (ref 11.5–15.4)
IMM GRANULOCYTES # BLD AUTO: 0.09 THOUSAND/UL (ref 0–0.2)
IMM GRANULOCYTES NFR BLD AUTO: 1 % (ref 0–2)
LYMPHOCYTES # BLD AUTO: 1.23 THOUSANDS/ÂΜL (ref 0.6–4.47)
LYMPHOCYTES NFR BLD AUTO: 12 % (ref 14–44)
MAGNESIUM SERPL-MCNC: 2.1 MG/DL (ref 1.9–2.7)
MCH RBC QN AUTO: 29.8 PG (ref 26.8–34.3)
MCHC RBC AUTO-ENTMCNC: 32.9 G/DL (ref 31.4–37.4)
MCV RBC AUTO: 91 FL (ref 82–98)
MONOCYTES # BLD AUTO: 0.64 THOUSAND/ÂΜL (ref 0.17–1.22)
MONOCYTES NFR BLD AUTO: 6 % (ref 4–12)
NEUTROPHILS # BLD AUTO: 8.57 THOUSANDS/ÂΜL (ref 1.85–7.62)
NEUTS SEG NFR BLD AUTO: 80 % (ref 43–75)
NRBC BLD AUTO-RTO: 0 /100 WBCS
PHOSPHATE SERPL-MCNC: 1.7 MG/DL (ref 2.3–4.1)
PLATELET # BLD AUTO: 141 THOUSANDS/UL (ref 149–390)
PMV BLD AUTO: 11.1 FL (ref 8.9–12.7)
POTASSIUM SERPL-SCNC: 3.4 MMOL/L (ref 3.5–5.3)
RBC # BLD AUTO: 2.62 MILLION/UL (ref 3.81–5.12)
RSV RNA RESP QL NAA+PROBE: NEGATIVE
SARS-COV-2 RNA RESP QL NAA+PROBE: NEGATIVE
SODIUM SERPL-SCNC: 140 MMOL/L (ref 135–147)
UNIT DISPENSE STATUS: NORMAL
UNIT PRODUCT CODE: NORMAL
UNIT PRODUCT VOLUME: 350 ML
UNIT RH: NORMAL
WBC # BLD AUTO: 10.69 THOUSAND/UL (ref 4.31–10.16)

## 2024-03-15 PROCEDURE — 88309 TISSUE EXAM BY PATHOLOGIST: CPT | Performed by: PATHOLOGY

## 2024-03-15 PROCEDURE — 99233 SBSQ HOSP IP/OBS HIGH 50: CPT | Performed by: INTERNAL MEDICINE

## 2024-03-15 PROCEDURE — 0241U HB NFCT DS VIR RESP RNA 4 TRGT: CPT

## 2024-03-15 PROCEDURE — 83735 ASSAY OF MAGNESIUM: CPT | Performed by: PHYSICIAN ASSISTANT

## 2024-03-15 PROCEDURE — 99024 POSTOP FOLLOW-UP VISIT: CPT | Performed by: STUDENT IN AN ORGANIZED HEALTH CARE EDUCATION/TRAINING PROGRAM

## 2024-03-15 PROCEDURE — 85025 COMPLETE CBC W/AUTO DIFF WBC: CPT | Performed by: PHYSICIAN ASSISTANT

## 2024-03-15 PROCEDURE — 84100 ASSAY OF PHOSPHORUS: CPT | Performed by: PHYSICIAN ASSISTANT

## 2024-03-15 PROCEDURE — 80048 BASIC METABOLIC PNL TOTAL CA: CPT | Performed by: PHYSICIAN ASSISTANT

## 2024-03-15 PROCEDURE — NC001 PR NO CHARGE: Performed by: STUDENT IN AN ORGANIZED HEALTH CARE EDUCATION/TRAINING PROGRAM

## 2024-03-15 PROCEDURE — 99306 1ST NF CARE HIGH MDM 50: CPT | Performed by: STUDENT IN AN ORGANIZED HEALTH CARE EDUCATION/TRAINING PROGRAM

## 2024-03-15 RX ORDER — POTASSIUM CHLORIDE 20 MEQ/1
20 TABLET, EXTENDED RELEASE ORAL 2 TIMES DAILY
Status: DISCONTINUED | OUTPATIENT
Start: 2024-03-15 | End: 2024-03-15

## 2024-03-15 RX ORDER — OXYCODONE HYDROCHLORIDE 5 MG/1
2.5 TABLET ORAL EVERY 6 HOURS PRN
Qty: 8 TABLET | Refills: 0 | Status: ON HOLD | OUTPATIENT
Start: 2024-03-15 | End: 2024-03-19

## 2024-03-15 RX ORDER — ACETAMINOPHEN 325 MG/1
975 TABLET ORAL EVERY 8 HOURS SCHEDULED
Qty: 63 TABLET | Refills: 0 | Status: ON HOLD | OUTPATIENT
Start: 2024-03-15 | End: 2024-03-22

## 2024-03-15 RX ORDER — ENOXAPARIN SODIUM 100 MG/ML
40 INJECTION SUBCUTANEOUS
Qty: 9.6 ML | Refills: 0 | Status: ON HOLD | OUTPATIENT
Start: 2024-03-16 | End: 2024-04-09

## 2024-03-15 RX ORDER — ENOXAPARIN SODIUM 100 MG/ML
40 INJECTION SUBCUTANEOUS
Status: DISCONTINUED | OUTPATIENT
Start: 2024-03-15 | End: 2024-03-15 | Stop reason: HOSPADM

## 2024-03-15 RX ORDER — POTASSIUM CHLORIDE 20 MEQ/1
20 TABLET, EXTENDED RELEASE ORAL DAILY
Status: DISCONTINUED | OUTPATIENT
Start: 2024-03-16 | End: 2024-03-15 | Stop reason: HOSPADM

## 2024-03-15 RX ORDER — POLYETHYLENE GLYCOL 3350 17 G/17G
17 POWDER, FOR SOLUTION ORAL DAILY
Qty: 119 G | Refills: 0 | Status: ON HOLD
Start: 2024-03-16 | End: 2024-03-23

## 2024-03-15 RX ORDER — MIDODRINE HYDROCHLORIDE 2.5 MG/1
2.5 TABLET ORAL
Status: DISCONTINUED | OUTPATIENT
Start: 2024-03-15 | End: 2024-03-15 | Stop reason: HOSPADM

## 2024-03-15 RX ORDER — GABAPENTIN 300 MG/1
300 CAPSULE ORAL 3 TIMES DAILY
Qty: 21 CAPSULE | Refills: 0 | Status: ON HOLD | OUTPATIENT
Start: 2024-03-15 | End: 2024-03-22

## 2024-03-15 RX ADMIN — PANTOPRAZOLE SODIUM 40 MG: 40 TABLET, DELAYED RELEASE ORAL at 05:57

## 2024-03-15 RX ADMIN — ENOXAPARIN SODIUM 40 MG: 40 INJECTION SUBCUTANEOUS at 09:46

## 2024-03-15 RX ADMIN — GABAPENTIN 300 MG: 300 CAPSULE ORAL at 09:46

## 2024-03-15 RX ADMIN — MIDODRINE HYDROCHLORIDE 2.5 MG: 2.5 TABLET ORAL at 11:31

## 2024-03-15 RX ADMIN — SODIUM CHLORIDE, SODIUM GLUCONATE, SODIUM ACETATE, POTASSIUM CHLORIDE, MAGNESIUM CHLORIDE, SODIUM PHOSPHATE, DIBASIC, AND POTASSIUM PHOSPHATE 75 ML/HR: .53; .5; .37; .037; .03; .012; .00082 INJECTION, SOLUTION INTRAVENOUS at 04:40

## 2024-03-15 RX ADMIN — HEPARIN SODIUM 5000 UNITS: 5000 INJECTION INTRAVENOUS; SUBCUTANEOUS at 05:58

## 2024-03-15 RX ADMIN — ACETAMINOPHEN 975 MG: 325 TABLET, FILM COATED ORAL at 05:56

## 2024-03-15 RX ADMIN — POTASSIUM PHOSPHATE, MONOBASIC POTASSIUM PHOSPHATE, DIBASIC 30 MMOL: 224; 236 INJECTION, SOLUTION, CONCENTRATE INTRAVENOUS at 11:31

## 2024-03-15 RX ADMIN — OXYCODONE HYDROCHLORIDE 5 MG: 5 TABLET ORAL at 14:47

## 2024-03-15 RX ADMIN — CALCIUM CARBONATE (ANTACID) CHEW TAB 500 MG 1000 MG: 500 CHEW TAB at 07:44

## 2024-03-15 RX ADMIN — ACETAMINOPHEN 975 MG: 325 TABLET, FILM COATED ORAL at 11:30

## 2024-03-15 RX ADMIN — Medication 2.5 MG: at 07:43

## 2024-03-15 RX ADMIN — POTASSIUM CHLORIDE 20 MEQ: 1500 TABLET, EXTENDED RELEASE ORAL at 09:46

## 2024-03-15 NOTE — ASSESSMENT & PLAN NOTE
Current regimen including: acetaminophen 975mg TID, gabapentin 300mg TID, oxycodone 2.5mg q6hr PRN  Monitor pain  Adjust/wean regimen as appropriate

## 2024-03-15 NOTE — CASE MANAGEMENT
Case Management Discharge Planning Note    Patient name Maira Moura  Location [facility] 901/[facility] 901-01 MRN [id number]  : 1955 Date 3/15/2024       Current Admission Date: 3/12/2024  Current Admission Diagnosis:Metastatic colon cancer to liver (HCC)   Patient Active Problem List    Diagnosis Date Noted    Diastolic dysfunction 2024    Neuropathy 2024    Platelets decreased (HCC) 2023    Stage 3 chronic kidney disease, unspecified whether stage 3a or 3b CKD (HCC) 2023    Chemotherapy induced neutropenia  2023    Dehydration 10/06/2023    Drug-induced constipation 10/06/2023    Anemia 2023    Poor appetite 2023    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HCC) 2023    Metastatic colon cancer to liver (HCC) 2023    GERD (gastroesophageal reflux disease)     Obesity     Right thyroid nodule 2023    Prediabetes 2022    Vitamin D deficiency 2019    Malignant neoplasm of right female breast (HCC) 2018    Mixed hyperlipidemia 2018    Primary hypertension 2017      LOS (days): 3  Geometric Mean LOS (GMLOS) (days): 5.1  Days to GMLOS:2.4     OBJECTIVE:  Risk of Unplanned Readmission Score: 23.16         Current admission status: Inpatient   Preferred Pharmacy:    [facility] [place] [address]  Phone: [phone] Fax: [phone]    [facility] [place] [address]  Phone: [phone] Fax: [phone]    Primary Care Provider: Fanta Turner MD    Primary Insurance: MEDICARE  Secondary Insurance: [facility]    DISCHARGE DETAILS:    Discharge planning discussed with:: Pt and pt's daughter Marleny  Freedom of Choice: Yes  Comments - Freedom of Choice: Pt and pt's daughter chose  TCF for STR  CM contacted family/caregiver?: Yes  Were Treatment Team  discharge recommendations reviewed with patient/caregiver?: Yes  Did patient/caregiver verbalize understanding of patient care needs?: N/A- going to facility  Were patient/caregiver advised of the risks associated with not following Treatment Team discharge recommendations?: Yes    Contacts  Patient Contacts: Marleny Moura-daughter  Relationship to Patient:: Family  Contact Method: In Person  Reason/Outcome: Continuity of Care, Emergency Contact, Referral, Discharge Planning    Requested Home Health Care         Is the patient interested in C at discharge?: No    DME Referral Provided  Referral made for DME?: No    Other Referral/Resources/Interventions Provided:  Interventions: Short Term Rehab  Referral Comments: Pt to be transferred to [facility] for STR         Treatment Team Recommendation: Short Term Rehab  Discharge Destination Plan:: Short Term Rehab  Transport at Discharge : [facility]LS Ambulance     Number/Name of Dispatcher: Roundtrip     ETA of Transport (Date): 03/15/24  ETA of Transport (Time): 1430              IMM Given (Date):: 03/13/24  IMM Given to:: Patient          Accepting Facility Name, City & State : [facility]  Receiving Facility/Agency Phone Number: [phone]  Facility/Agency Fax Number: [phone]

## 2024-03-15 NOTE — PROGRESS NOTES
[facility]  Facility: [facility]    HISTORY AND PHYSICAL  Nursing Home Place of Service: nursing home place of service: POS 31 Skilled Care-Part A Coverage    NAME: Maira Moura  : 1955 AGE: 69 y.o. SEX: female MRN: [id number]  DATE OF ENCOUNTER: 3/15/2024    Records Reviewed include: Hospital records    Chief Complaint/ Reason for Admission:   Metastatic colon cancer to liver     History of Present Illness:     Maira Moura is a 69 y.o. female with PMH including HTN, HLD, breast cancer, prediabetes, GERD, metastatic colon cancer (s/p chemo as of 2024 and now s/p surgery), CKD3, right squamous cell carcinoma of tonsil, hx CVA  Patient was hospitalized at [facility] from 3/12 to 3/15/24  For details of hospitalization, see hospital records including discharge documentation  Briefly, patient hospitalized for elective robotic right hemicolectomy for metastatic colon cancer; underwent procedure on 3/12/24 per surgical oncology; evaluated by Nephrology for GOGO, treated with IV fluids; diet was advanced; Pope was removed post-operatively; to follow up with PCP and Surgical Oncology.      Patient seen and examined in room  Others present: none  Patient laying in bed  Appears comfortable, awake, alert, oriented to situation, able to converse appropriately  Polite, Aox3, notes she feels OK with no acute concerns. Feels much better in terms of abdominal pain post-op, pain gradually improving over time, no pain at rest, gets some pain when moving around but overal improving. States she has been able to walk up to around 30 feet in hospital post-op with therapy and pain tolerable on present regimen. Appetite stable, at baseline she thinks. No N/V. Last BM today soft. Urinating fine. Breathing fine. No acute cardiopulmonary, abdominal, or urinary symptoms; see ROS for more details.    No further questions or acute concerns identified.    Lab Review:   3/15: HAY with MI  "3.4, GFR 83 (baseline seems in 60s-80s); phos 1.7; Mg 2.1; CBC with WBC 10.69 (trend down, recent peak 13.94 on 3/14), Hb 7.8 (normocytic, recent baseline seems around 8-10), plt 141 (stable; baseline WNL); recent TSH WNL; recent A1c 5.5 (5.9 prior); recent iron low at 27; no recent B12 level on file      CT head 3/13/24: \"No acute intracranial abnormality.  Chronic pansinusitis. Bilateral mild mastoiditis.\"  CXR 2/21/24: \"No acute cardiopulmonary disease.  New portacatheter compared to prior study of 6/13/2023\"      EKG 12/12/23: reported as \"Sinus tachycardia at 104 bpm.  Otherwise, normal EKG. \"; image not accessible  Echo Oct 2023: EF 75, hyperdynamic systolic function, grade 1 diastolic dysfunction    Social: Patient lives at home with daughter and son. At baseline no DME. Generally independent of ADLs at baseline.    Lives: Home,  Social Support: family  Fall in the past 12 months: ~3 (generally tripping on clutter at home, denies associated injuries or preceding cardiopulmonary symptoms or syncope)  Use of assistance Device: None    Allergies  No Known Allergies    Past Medical History  Past Medical History:   Diagnosis Date    Anemia     Arthritis     Body mass index (BMI) 40.0-44.9, adult (HCC)     Breast cancer (HCC) 12/17/2018    Cancer (HCC)     right breast, colon, liver, right tonsil    Cervical lymphadenopathy     CT neck on 3/30/2023- Large right level 2A lymphadenopathy as described above and suspicious for neoplasm.  Correlation with histopathology is recommended.  Mild asymmetric prominence of the right palatine tonsil with otherwise no definitive nodular enhancing lesions identified along the course of the aerodigestive tract.     5/26/23- FNA of this node was nonrevealing for tissue etiology, but it d    Encounter for screening for HIV 07/07/2022    Follow-up examination 04/04/2023    GERD (gastroesophageal reflux disease)     Hyperlipidemia     Hypertension     Obesity     Stroke (HCC)     TIA  "    Stroke (HCC)     TIA     Transaminitis 09/22/2021    Vasomotor rhinitis     Refilled flonase today. Stopped taking since January 2022        Past Surgical History:   Procedure Laterality Date    BREAST BIOPSY Right 11/23/2018    us guided bx cancer    BREAST SURGERY      COLONOSCOPY      ESOPHAGOSCOPY N/A 07/20/2023    Procedure: ESOPHAGOSCOPY;  Surgeon: David Chapa MD;  Location: [facility];  Service: ENT    HEMICOLOECTOMY W/ ANASTOMOSIS Right 3/12/2024    Procedure: RIGHT COLECTOMY WITH ROBOTICS;  Surgeon: Vickie Israel MD;  Location: [facility];  Service: Surgical Oncology    IR BIOPSY LIVER MASS  06/27/2023    IR PORT CHECK  01/03/2024    IR PORT PLACEMENT  07/28/2023    IR PORT STRIPPING  01/09/2024    LAPAROTOMY N/A 3/12/2024    Procedure: LAPAROTOMY EXPLORATORY W/ ROBOTICS;  Surgeon: Vickie Israel MD;  Location: [facility];  Service: Surgical Oncology    LA [facility] INCL FLUOR GDNCE DX W/CELL WASHG SPX N/A 07/20/2023    Procedure: BRONCHOSCOPY;  Surgeon: David Chapa MD;  [facility]: [facility];  Service: ENT    LA LARYNGOSCOPY W/BIOPSY MICROSCOPE/TELESCOPE N/A 07/20/2023    Procedure: MICRODIRECT LARYNGOSCOPY WITH BIOPSY;  Surgeon: David Chapa MD;  Location: [facility];  Service: ENT    LA MASTECTOMY PARTIAL W/AXILLARY LYMPHADENECTOMY Right 12/20/2018    Procedure: ULTRASOUND LOCALIZED PARTIAL MASTECTOMY W/SENTINEL NODE BIOPSY POSS AXILLARY DISSECTION;  Surgeon: Zahida Coronado MD;  Location: [facility];  Service: General    TUBAL LIGATION      US GUIDED BREAST BIOPSY RIGHT COMPLETE Right 11/23/2018    US GUIDED INJECTION FOR RESEARCH STUDY  12/20/2018    US GUIDED INJECTION FOR RESEARCH STUDY  12/07/2018    US GUIDED INJECTION FOR RESEARCH STUDY  05/03/2019    US GUIDED LYMPH NODE BIOPSY RIGHT  05/26/2023       Family History  Family History   Problem Relation Age of Onset    Colon cancer Mother 58    Hypertension Mother     Pancreatic cancer Father 60    Hypertension Daughter      Hypertension Son        Social History  Social History     Tobacco Use   Smoking Status Never    Passive exposure: Never   Smokeless Tobacco Never   Tobacco Comments    NO TOBACCO USE      Social History     Substance and Sexual Activity   Alcohol Use Never      Social History     Substance and Sexual Activity   Drug Use Never            Physical Exam    Vital Signs  There were no vitals filed for this visit.  BP: 138/72 mmHg  3/15/2024 16:00   Temp:96.1 °F  3/15/2024 15:59   Pulse:106 bpm  3/15/2024 16:00 Weight:228.6 Lbs  3/15/2024 16:00   Resp:18 Breaths/min  3/15/2024 15:59 BS: O2:91 %  3/15/2024 15:59 Pain:0  3/15/2024 22:52         Physical Exam  Vitals reviewed.   Constitutional:       General: She is not in acute distress.     Appearance: She is obese. She is not toxic-appearing or diaphoretic.   HENT:      Head: Normocephalic and atraumatic.      Right Ear: External ear normal.      Left Ear: External ear normal.      Nose: Nose normal. No rhinorrhea.      Mouth/Throat:      Mouth: Mucous membranes are moist.      Pharynx: Oropharynx is clear. No posterior oropharyngeal erythema.   Eyes:      General: No scleral icterus.        Right eye: No discharge.         Left eye: No discharge.      Extraocular Movements: Extraocular movements intact.      Conjunctiva/sclera: Conjunctivae normal.      Pupils: Pupils are equal, round, and reactive to light.   Cardiovascular:      Rate and Rhythm: Normal rate and regular rhythm.   Pulmonary:      Effort: Pulmonary effort is normal. No respiratory distress.      Breath sounds: Normal breath sounds. No wheezing or rales.   Abdominal:      General: Bowel sounds are normal.      Palpations: Abdomen is soft.      Tenderness: There is no abdominal tenderness. There is no guarding.      Comments: Laparoscopic operative sites over abdomen with glue, appear healing without erythema, bleed, or drainage   Musculoskeletal:         General: No swelling or tenderness.      Cervical  back: No rigidity.   Skin:     General: Skin is warm and dry.      Coloration: Skin is not jaundiced.   Neurological:      General: No focal deficit present.      Mental Status: She is alert and oriented to person, place, and time. Mental status is at baseline.   Psychiatric:         Mood and Affect: Mood normal.         Behavior: Behavior normal.         Thought Content: Thought content normal.         Judgment: Judgment normal.         Review of Systems:  Review of Systems   Constitutional:  Negative for appetite change (generally chronically low but stable), chills, diaphoresis and fever.   HENT:  Negative for drooling, ear pain, hearing loss, rhinorrhea, sore throat and trouble swallowing.    Eyes:  Negative for pain, discharge, redness, itching and visual disturbance.   Respiratory:  Negative for cough, chest tightness, shortness of breath and wheezing.    Cardiovascular:  Negative for chest pain, palpitations and leg swelling.   Gastrointestinal:  Positive for abdominal pain (none at rest but present with movements, overall improving post-op). Negative for blood in stool, constipation, diarrhea, nausea and vomiting.   Genitourinary:  Negative for difficulty urinating, dysuria, hematuria and urgency.   Musculoskeletal:  Positive for gait problem. Negative for arthralgias and back pain.   Skin:  Negative for color change.   Neurological:  Positive for weakness. Negative for dizziness, facial asymmetry, speech difficulty, light-headedness and headaches.   Psychiatric/Behavioral:  Negative for agitation, behavioral problems and confusion. The patient is not nervous/anxious and is not hyperactive.    All other systems reviewed and are negative.      List of Current Medications:  Current Outpatient Medications   Medication Instructions    acetaminophen (TYLENOL) 975 mg, Oral, Every 8 hours scheduled    anastrozole (ARIMIDEX) 1 mg, Oral, Daily    atorvastatin (LIPITOR) 40 mg, Oral, Daily at bedtime    Cyanocobalamin  (B-12 PO) Take by mouth    docusate sodium (COLACE) 50 mg, Oral, 2 times daily    [START ON 3/16/2024] enoxaparin (LOVENOX) 40 mg, Subcutaneous, Every 24 hours scheduled    ergocalciferol (VITAMIN D2) 50,000 Units, Oral, Weekly    folic acid (FOLVITE) 1 mg, Oral, Daily    gabapentin (NEURONTIN) 300 mg, Oral, 3 times daily    lidocaine-prilocaine (EMLA) cream Topical, As needed    losartan (COZAAR) 50 mg, Oral, Daily    mirtazapine (REMERON) 30 mg, Oral, Daily at bedtime    omeprazole (PRILOSEC) 20 mg, Oral, Daily    ondansetron (ZOFRAN) 8 mg, Oral, Every 8 hours PRN    oxyCODONE (ROXICODONE) 2.5 mg, Oral, Every 6 hours PRN, For continuation of therapy status post robotic right hemicolectomy by Dr. Israel    [START ON 3/16/2024] polyethylene glycol (MIRALAX) 17 g, Oral, Daily    potassium chloride (K-DUR,KLOR-CON) 20 mEq tablet 20 mEq, Oral, 2 times daily    prochlorperazine (COMPAZINE) 10 mg, Oral, Every 6 hours PRN    pyridoxine (VITAMIN B6) 50 mg, Oral, Daily         Medication reviewed. All orders signed. Complete list is in the paper chart.     Allergies  No Known Allergies    Labs/Diagnostics (reviewed by this provider):     I personally reviewed lab results and imaging studies. Full reports are in the paper chart.     Assessment/Plan:    Primary hypertension  Monitor BP  Avoid hypotension  Continue losartan 50mg daily with holding parameters  No acute cardiac complaints    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HCC)  Diagnosed July 2023  Following with Oncology outpatient    GERD (gastroesophageal reflux disease)  -stable  -recommend OOB with meals, sit upright for at least 30 minutes afterwards, avoid trigger foods  -continue to monitor  -follow up with PCP, GI as appropriate  -continue PPI    Metastatic colon cancer to liver (HCC)  Diagnosed July 2023  S/p chemotherapy as of Jan 2024  Underwent elective robotic right hemicolectomy on 3/12/24 per surgical oncology  Follow up with Surgical Oncology  "outpatient    Stage 3a chronic kidney disease (HCC)  Lab Results   Component Value Date    EGFR 83 03/15/2024    EGFR 37 03/14/2024    EGFR 65 03/13/2024    CREATININE 0.74 03/15/2024    CREATININE 1.42 (H) 03/14/2024    CREATININE 0.90 03/13/2024       Labs seem consistent with CKD 2 vs 3a  Monitor renal function on routine labs  Avoid nephrotoxins, NSAIDs as able  Encourage PO hydration, respecting volume status      Anemia  Hb historic baseline around 12, recent baseline seems around 8-10  Likely multifactorial with some component of CKD, iron deficiency, and now acutely in setting of recent surgery  S/p transfusion in hospital on 3/14  -monitor on routine labs  -monitor for acute bleed - no present signs  -consider iron supplementation  -consider further workup, Heme consult if persistent/worsening  -transfuse PRN Hb <7      Thrombocytopenia (HCC)  Likely in setting of recent surgery  Mild, platelets >100  -monitor on routine labs  -monitor for acute bleed - no present signs  -consider further workup, Heme consult if persistent/worsening      Malignant neoplasm of right female breast (HCC)  Diagnosed with right breast cancer in 2018  Right breast biopsy Nov 2018: \"Invasive breast carcinoma of no special type (ductal NST/invasive ductal carcinoma)\"  - grade 2  - %  - TN 95%  - HER2 negative  Underwent right partial mastectomy and SLN biopsy Dec 2018 - \"Infiltrating ductal carcinoma \"  Treated with radiation therapy Feb-April 2019  Continue anastrozole  Follow up with Oncology    Prediabetes  recent A1c 5.5 (5.9 prior)  Monitor glucose as part of routine labs; no indication for daily accuchecks at present  Monitor off diabetic meds  Encourage lifestyle modifications including controlled weight loss, exercise, healthy diet  Follow up with PCP    Obesity  See plan under prediabetes    Mixed hyperlipidemia  Continue statin  Follow up with PCP    Diastolic dysfunction  Echo Oct 2023: EF 75, hyperdynamic systolic " function, grade 1 diastolic dysfunction  Not appearing to be on diuretics at baseline; monitor for appropriateness of starting  Monitor daily weight  Monitor volume status clinically      Hypokalemia  Noted on recent lab review  Continues on daily potassium chloride 20mg BID  Monitor on routine labs  Replete as appropriate  If persistent, check Mg (has been normal recently)    Leukemoid reaction  recent WBC peak 13.94 on 3/14  Likely reactive in post-op setting  Monitor on routine labs  Monitor for signs of infection    GOGO (acute kidney injury) (HCC)  Noted in hospital in post-op setting  Evaluated by Nephrology in hospital  Improved with IV fluids  Acute issue appears resolved  See plan under CKD    Acute post-operative pain  Current regimen including: acetaminophen 975mg TID, gabapentin 300mg TID, oxycodone 2.5mg q6hr PRN  Monitor pain  Adjust/wean regimen as appropriate    At risk for constipation  At risk due to hospitalization, relative immobility, comorbidities, post-op, opioid  Monitor stool output  Bowel regimen at facility: continue docusate, consider miralax and senna as appropriate; consider bisacodyl suppository PRN if no BM in 2-3 days  Encourage mobility as tolerated, PO hydration as appropriate, high fiber diet/prune juice (in outpatient setting as appropriate)  Goal is for 1 easy BM every 1-2 days      At risk for delirium  Delirium precautions  -Patient is high risk of delirium due to age, comorbidities, hospitalization/unfamiliar environment, post-op  -delirium precautions  -maintain normal sleep/wake cycle  -minimize overnight interruptions, group overnight vitals/labs/nursing checks as possible  -dim lights, close blinds and turn off tv to minimize stimulation and encourage sleep environment in evenings  -ensure that pain is well controlled  -monitor for fecal and urinary retention which may precipitate delirium  -encourage early mobilization and ambulation  -provide frequent reorientation and  redirection  -encourage family and friends at the bedside to help help calm patient if anxious  -avoid medications which may precipitate or worsen delirium such as tramadol, benzodiazepine, anticholinergics, and benadryl  -encourage hydration and nutrition   -redirect unwanted behaviors as first line, avoid physical restraints, use chemical restraint only if all other attempts have been unsuccessful     Advance care planning  HCP and code discussion as below    Physical deconditioning  In setting of metastatic cancer, recent hospitalization, surgery  -PT/OT  -fall precautions  -encourage appropriate DME use  -SW to follow for safe discharge planning/homecare services as appropriate      History of CVA (cerebrovascular accident)  Reported stroke in 2017  Continue statin  Not on aspirin outpatient, defer to PCP     Pain: none at rest  Rehab Potential:Good  Patient Informed of Medical Condition: yes   Patient is Capable of Understanding Their Right: yes   Discharge Plan: home  Vaccination:   Immunization History   Administered Date(s) Administered    INFLUENZA 12/11/2018    Influenza, high dose seasonal 0.7 mL 09/22/2021    Influenza, recombinant, quadrivalent,injectable, preservative free 12/11/2018    Pneumococcal Polysaccharide PPV23 09/22/2021    Tdap 04/25/2018    Zoster 05/09/2018       DVT ppx: lovenox (through 4/9/24 per hospital record)    Advanced Directives: patient verbalizes HCP as son Santi Luke; prefers not to name any alternate presently  Code status:Full Code Extensive discussion/education with patient today regarding their wishes with respect to resuscitative measures; discussed potential risks vs benefits of resuscitative measures; patient verbalizes understanding, appears to have capacity to make this decision presently, and clearly verbalizes a desire for Full Code  PCP: Yue      -Total time spent on this encounter today including documentation and workup review, face to face time, history  and exam, and documentation/orders was approximately 60 minutes.  -This note will be copied to [facility] EMR where vitals and medication orders are placed.    Stephan Love D.O.  Geriatric Medicine  3/15/2024 11:03 PM

## 2024-03-15 NOTE — ASSESSMENT & PLAN NOTE
Likely in setting of recent surgery  Mild, platelets >100  -monitor on routine labs  -monitor for acute bleed - no present signs  -consider further workup, Heme consult if persistent/worsening

## 2024-03-15 NOTE — ASSESSMENT & PLAN NOTE
Delirium precautions  -Patient is high risk of delirium due to age, comorbidities, hospitalization/unfamiliar environment, post-op  -delirium precautions  -maintain normal sleep/wake cycle  -minimize overnight interruptions, group overnight vitals/labs/nursing checks as possible  -dim lights, close blinds and turn off tv to minimize stimulation and encourage sleep environment in evenings  -ensure that pain is well controlled  -monitor for fecal and urinary retention which may precipitate delirium  -encourage early mobilization and ambulation  -provide frequent reorientation and redirection  -encourage family and friends at the bedside to help help calm patient if anxious  -avoid medications which may precipitate or worsen delirium such as tramadol, benzodiazepine, anticholinergics, and benadryl  -encourage hydration and nutrition   -redirect unwanted behaviors as first line, avoid physical restraints, use chemical restraint only if all other attempts have been unsuccessful

## 2024-03-15 NOTE — ASSESSMENT & PLAN NOTE
At risk due to hospitalization, relative immobility, comorbidities, post-op, opioid  Monitor stool output  Bowel regimen at facility: continue docusate, consider miralax and senna as appropriate; consider bisacodyl suppository PRN if no BM in 2-3 days  Encourage mobility as tolerated, PO hydration as appropriate, high fiber diet/prune juice (in outpatient setting as appropriate)  Goal is for 1 easy BM every 1-2 days

## 2024-03-15 NOTE — ASSESSMENT & PLAN NOTE
Echo Oct 2023: EF 75, hyperdynamic systolic function, grade 1 diastolic dysfunction  Not appearing to be on diuretics at baseline; monitor for appropriateness of starting  Monitor daily weight  Monitor volume status clinically

## 2024-03-15 NOTE — DISCHARGE INSTR - AVS FIRST PAGE
DISCHARGE INSTRUCTIONS    Follow Up: Follow up with Dr. Israel 4/2/2024 at 1 PM on     Diet: You may resume renal liberal diet    Pain: Tylenol 975 mg every 8 hours scheduled for 7 days.  Gabapentin 300 mg 3 times a day for 7 days.  Oxycodone 2.5 mg every 6 hours as needed for moderate severe pain.    Shower: You may shower over the wound. Do not bathe or use a pool or hot tub until cleared by the physician.    Activity: As tolerated. You may go up and down stairs, walk as much as you are comfortable, but walk at least 3 times each day. Do not lift anything heavier than 15 pounds for at least 2-4 weeks, unless cleared by the doctor.    Driving: Do not drive or make any important decisions while on narcotic pain medication. Generally, you may drive when your off all narcotic pain medications.    Medications: Resume all of your previous medications, unless told otherwise by the doctor.  You do not need to take the narcotic pain medications unless you are having significant pain and discomfort.    Call the office: If you are experiencing any of the following, fevers above 101.5° or chills, significant nausea or vomiting, increase in abdominal pain, if the wound develops drainage and/or is excessive redness around the wound, or if you have significant diarrhea or other worsening symptoms.

## 2024-03-15 NOTE — ASSESSMENT & PLAN NOTE
Monitor BP  Avoid hypotension  Continue losartan 50mg daily with holding parameters  No acute cardiac complaints

## 2024-03-15 NOTE — ASSESSMENT & PLAN NOTE
Diagnosed July 2023  S/p chemotherapy as of Jan 2024  Underwent elective robotic right hemicolectomy on 3/12/24 per surgical oncology  Follow up with Surgical Oncology outpatient

## 2024-03-15 NOTE — ASSESSMENT & PLAN NOTE
recent A1c 5.5 (5.9 prior)  Monitor glucose as part of routine labs; no indication for daily accuchecks at present  Monitor off diabetic meds  Encourage lifestyle modifications including controlled weight loss, exercise, healthy diet  Follow up with PCP

## 2024-03-15 NOTE — ASSESSMENT & PLAN NOTE
Hb historic baseline around 12, recent baseline seems around 8-10  Likely multifactorial with some component of CKD, iron deficiency, and now acutely in setting of recent surgery  S/p transfusion in hospital on 3/14  -monitor on routine labs  -monitor for acute bleed - no present signs  -consider iron supplementation  -consider further workup, Heme consult if persistent/worsening  -transfuse PRN Hb <7

## 2024-03-15 NOTE — ASSESSMENT & PLAN NOTE
"Diagnosed with right breast cancer in 2018  Right breast biopsy Nov 2018: \"Invasive breast carcinoma of no special type (ductal NST/invasive ductal carcinoma)\"  - grade 2  - %  - CT 95%  - HER2 negative  Underwent right partial mastectomy and SLN biopsy Dec 2018 - \"Infiltrating ductal carcinoma \"  Treated with radiation therapy Feb-April 2019  Continue anastrozole  Follow up with Oncology  "

## 2024-03-15 NOTE — ASSESSMENT & PLAN NOTE
Lab Results   Component Value Date    EGFR 83 03/15/2024    EGFR 37 03/14/2024    EGFR 65 03/13/2024    CREATININE 0.74 03/15/2024    CREATININE 1.42 (H) 03/14/2024    CREATININE 0.90 03/13/2024       Labs seem consistent with CKD 2 vs 3a  Monitor renal function on routine labs  Avoid nephrotoxins, NSAIDs as able  Encourage PO hydration, respecting volume status

## 2024-03-15 NOTE — ASSESSMENT & PLAN NOTE
Noted on recent lab review  Continues on daily potassium chloride 20mg BID  Monitor on routine labs  Replete as appropriate  If persistent, check Mg (has been normal recently)

## 2024-03-15 NOTE — ASSESSMENT & PLAN NOTE
Noted in hospital in post-op setting  Evaluated by Nephrology in hospital  Improved with IV fluids  Acute issue appears resolved  See plan under CKD

## 2024-03-15 NOTE — ASSESSMENT & PLAN NOTE
In setting of metastatic cancer, recent hospitalization, surgery  -PT/OT  -fall precautions  -encourage appropriate DME use  -SW to follow for safe discharge planning/homecare services as appropriate

## 2024-03-15 NOTE — ASSESSMENT & PLAN NOTE
recent WBC peak 13.94 on 3/14  Likely reactive in post-op setting  Monitor on routine labs  Monitor for signs of infection

## 2024-03-15 NOTE — DISCHARGE SUMMARY
Discharge Summary -surgical oncology  Maira Moura 69 y.o. female MRN: [id number]  Unit/Bed#: [facility] 901-01 Encounter: [id number]    Admission Date: 3/12/2024     Discharge Date: 3/15/2024    Admitting Diagnosis: Metastatic colon cancer to liver (HCC) [C18.9, C78.7]    Discharge Diagnosis: Metastatic colon cancer to the liver status post robotic right hemicolectomy by Dr. Israel    Attending and Service: Dr. Israel, surgical oncology services.    Consulting Physician(s): Nephrology    Imaging and Procedures Performed:   3/12/2024 robotic right hemicolectomy by Dr. Israel    CT head wo contrast    Result Date: 3/13/2024  Impression: No acute intracranial abnormality. Chronic pansinusitis. Bilateral mild mastoiditis. Workstation performed: [id number]     Pathology: Pending    Hospital Course: Maira Moura is a 69-year-old female with a past medical history of HTN, HLD, obesity, history of stroke and breast cancer who presented with metastatic colon cancer for elective surgical intervention including robotic right hemicolectomy.  Patient underwent surgical intervention without complications.    Postoperatively patient was started on a clear liquid diet, IV fluids, DVT prophylaxis, he said Dilaudid pump for pain control, as needed antiemetics and pain control regimen, was encouraged to utilize her incentive spirometer as well as be out of bed and ambulating.  Nephrology was consulted to assist with postoperative management fluid resuscitation and needs.  Her diet was slowly advanced throughout the rest of her hospital stay as tolerated.    Her Pope was removed on postoperative day 2 and she urinated without any difficulties.  By postoperative day 3 patient was tolerating diet without nausea or vomiting, her bowel function returned and she was having BMs.  She was utilizing her incentive spirometer as well as was out of bed and ambulating with minor assistance.  She was cleared for discharge from a surgical oncology  standpoint.    Patient was discharged and cleared by nephrology as well.  She was discharged to [facility] in [place] on Lovenox, MiraLAX, Colace, oxycodone, gabapentin.     All scripts were sent to [facility] pharmacy as instructed by provider.  All questions answered.    On discharge, the patient is instructed to follow-up with the patient's primary care provider within the next 2 weeks to review the events of the recent hospitalization.  The patient is instructed to follow-up with Dr. Israel as scheduled.  The patient is instructed to follow the provided discharge instructions.     Condition at Discharge: good     Discharge instructions/Information to patient and family:   See after visit summary for information provided to patient and family.      Provisions for Follow-Up Care:  See after visit summary for information related to follow-up care and any pertinent home health orders.      Disposition: Skilled nursing facility at [facility]    Planned Readmission: No    Discharge Statement   I spent 30 minutes discharging the patient. This time was spent on the day of discharge. I had direct contact with the patient on the day of discharge. Additional documentation is required if more than 30 minutes were spent on discharge.     Discharge Medications:  See after visit summary for reconciled discharge medications provided to patient and family.    Lisy Hallman PA-C  3/15/2024  11:58 AM

## 2024-03-15 NOTE — QUICK NOTE
Patient does not require any further blood transfusions. Hgb remains stable >7, no need to transfuse until <7 per surgical oncology    aPdilla Shankar PA-C

## 2024-03-15 NOTE — PROGRESS NOTES
"Progress Note - Surgical Oncology   Maira Moura 69 y.o. female MRN: [id number]  [facility] Encounter: [id number]    Assessment:  61-year-old female who underwent robotic right hemicolectomy for metastatic colon cancer.    Plan:  -Continue toast and crackers  -May recommend nutrition consult for calorie count  -Continue IV fluids  -Appreciate nephrology consult  -DVT prophylaxis  -Encourage out of bed ambulation  -Encourage I-S use  -STR referrals placed: Follow-up  -PT/OT: Level 2    Subjective/Objective     Subjective: No acute events this morning.  Patient reports no nausea or vomiting but has not had much to eat.  She is not passing flatus and having bowel movements.  She was out of bed and was able to ambulate yesterday in her room.  Reports that her pain is well-controlled    Objective: Afebrile overnight hemodynamically normal on room air    Blood pressure 138/78, pulse 98, temperature 98.5 °F (36.9 °C), resp. rate (!) 32, height 5' 5\" (1.651 m), weight 97.5 kg (215 lb), SpO2 94%, not currently breastfeeding.,Body mass index is 35.78 kg/m².    UOP: 550 cc x 2 unmeasured 4 AM shift..  BM x 2    Invasive Devices       Central Venous Catheter Line  Duration             Port A Cath 07/28/23 Right Internal jugular 230 days              Peripheral Intravenous Line  Duration             Peripheral IV 03/13/24 Left;Proximal;Upper;Ventral (anterior) Arm 2 days                    Physical Exam:  General: No acute distress answers questions appropriately  CV: RRR  Lungs: Normal work of breathing  Abdomen: Soft postsurgically tender along lower Pfannenstiel incision, nondistended  Incision/s clean dry and intact with Exofin  Skin: Warm, dry    Lab, Imaging and other studies:  Recent Labs     03/13/24  0646 03/14/24  0454 03/14/24  0655 03/14/24  1653   WBC 11.38* 10.61* 10.32* 13.94*   HGB 8.2* 6.7* 6.8* 8.6*   * 112* 101* 139*   SODIUM 139 145  --   --    K 3.9 3.5  --   --     110*  --   " --    CO2 25 26  --   --    BUN 9 15  --   --    CREATININE 0.90 1.42*  --   --    GLUC 141* 114  --   --    CALCIUM 8.5 7.8*  --   --    MG 1.3* 2.3  --   --    PHOS 3.1 2.4  --   --    AST 47*  --   --   --    ALT 27  --   --   --    ALKPHOS 68  --   --   --    TBILI 0.58  --   --   --      VTE Pharmacologic Prophylaxis: Heparin  VTE Mechanical Prophylaxis: sequential compression device

## 2024-03-16 ENCOUNTER — TELEPHONE (OUTPATIENT)
Dept: OTHER | Facility: OTHER | Age: 69
End: 2024-03-16

## 2024-03-16 NOTE — TELEPHONE ENCOUNTER
Marion montenegro [place] requested to speak to the on call provider regarding the pt's elevated blood pressure and heart rate.    Contacted the on call via TC.

## 2024-03-18 ENCOUNTER — NURSING HOME VISIT (OUTPATIENT)
Dept: GERIATRICS | Facility: OTHER | Age: 69
End: 2024-03-18
Payer: MEDICARE

## 2024-03-18 DIAGNOSIS — D72.823 LEUKEMOID REACTION: ICD-10-CM

## 2024-03-18 DIAGNOSIS — N18.31 ANEMIA DUE TO STAGE 3A CHRONIC KIDNEY DISEASE  (HCC): ICD-10-CM

## 2024-03-18 DIAGNOSIS — D63.1 ANEMIA DUE TO STAGE 3A CHRONIC KIDNEY DISEASE  (HCC): ICD-10-CM

## 2024-03-18 DIAGNOSIS — I10 PRIMARY HYPERTENSION: Primary | ICD-10-CM

## 2024-03-18 DIAGNOSIS — C18.9 METASTATIC COLON CANCER TO LIVER (HCC): ICD-10-CM

## 2024-03-18 DIAGNOSIS — D69.6 THROMBOCYTOPENIA (HCC): ICD-10-CM

## 2024-03-18 DIAGNOSIS — G89.18 ACUTE POST-OPERATIVE PAIN: ICD-10-CM

## 2024-03-18 DIAGNOSIS — R09.81 CHRONIC NASAL CONGESTION: ICD-10-CM

## 2024-03-18 DIAGNOSIS — C78.7 METASTATIC COLON CANCER TO LIVER (HCC): ICD-10-CM

## 2024-03-18 DIAGNOSIS — I51.89 DIASTOLIC DYSFUNCTION: ICD-10-CM

## 2024-03-18 PROCEDURE — 99309 SBSQ NF CARE MODERATE MDM 30: CPT | Performed by: STUDENT IN AN ORGANIZED HEALTH CARE EDUCATION/TRAINING PROGRAM

## 2024-03-18 NOTE — PROGRESS NOTES
[facility]  Facility: [facility]    PROGRESS NOTE  Nursing Home Place of Service: nursing home place of service: POS 31 Skilled Care-Part A Coverage    NAME: Maira Moura  : 1955 AGE: 69 y.o. SEX: female MRN: [id number]  DATE OF ENCOUNTER: 3/18/2024    Assessment and Plan     Problem List Items Addressed This Visit       Primary hypertension - Primary     Monitor BP - generally persistently above goal in 140s-160s systolic  Avoid hypotension  As of 3/18 titrate losartan from 50 mg daily to 50mg BID with holding parameters  No acute cardiac complaints         Metastatic colon cancer to liver (HCC)     Diagnosed 2023  S/p chemotherapy as of 2024  Underwent elective robotic right hemicolectomy on 3/12/24 per surgical oncology  Pain control as appropriate  Follow up with Surgical Oncology outpatient         Anemia     Hb historic baseline around 12, recent baseline seems around 8-10  Likely multifactorial with some component of CKD, iron deficiency, and now acutely in setting of recent surgery  S/p transfusion in hospital on 3/14  Recent iron low at 27  -monitor on routine labs - fairly stable  -monitor for acute bleed - no present signs  -start on ferrous sulfate PO qOD  -consider further workup, Heme consult if persistent/worsening  -transfuse PRN Hb <7         Thrombocytopenia (HCC)     Likely in setting of recent surgery  Has been mild, platelets >100  -monitor on routine labs - has been improving and now WNL  -monitor for acute bleed - no present signs  -consider further workup, Heme consult if persistent/worsening            Diastolic dysfunction     strike-out   3/18/2024 05:51 227.9 Lbs Bed dhoward (Manual)    strike-out   3/17/2024 05:16 229.1 Lbs Bed irivera (Manual)    strike-out   3/16/2024 05:02 229.0 Lbs Bed shazzard (Manual)    strike-out   3/15/2024 16:00 228.6 Lbs Bed jcarr (Manual)      Echo Oct 2023: EF 75, hyperdynamic systolic  function, grade 1 diastolic dysfunction  Not appearing to be on diuretics at baseline; monitor for appropriateness of starting  Monitor daily weight - fairly stable, no alarming uptrend  Monitor volume status clinically - appears stable/euvolemic         Leukemoid reaction     recent WBC peak 13.94 on 3/14  Likely reactive in post-op setting  Monitor on routine labs - has improved to WNL  Monitor for signs of infection - no present acute signs         Acute post-operative pain     Current regimen including: acetaminophen 975mg TID, gabapentin 300mg TID, oxycodone 2.5mg q6hr PRN  Monitor pain - stable, overall gradually improving  Adjust/wean regimen as appropriate         Chronic nasal congestion     Patient notes chronic stable nasal congestion for a long time, no recent/acute change  No acute associated respiratory symptoms such as acute cough/wheeze/SOB  Trial of flonase                Chief Complaint     Follow up post-op, pain    History of Present Illness     Maira Moura is a 69 y.o. female who was seen today for follow up. PMH including HTN, HLD, breast cancer, prediabetes, GERD, metastatic colon cancer (s/p chemo as of Jan 2024 and now s/p surgery), CKD3, right squamous cell carcinoma of tonsil, hx CVA     Patient seen and examined in room  Others present: none  Patient seated in chair  Appears comfortable, awake, alert, oriented to situation, able to converse appropriately  Polite, Aox3, notes she feels well overall today with no acute concerns. Feels he weekend went well. Feels much better in terms of abdominal pain post-op, pain gradually improving over time, no pain at rest, still gets some pain when moving around or working with therapy but overal improving; she feels pain is controlled on present regimen and does not feel limited in her therapy due to her pain. Appetite stable, tolerating diet well. No N/V. Last BM 2 days ago was soft. Urinating fine. Breathing fine, notes chronic nasal  "congestion. No acute cardiopulmonary, abdominal, or urinary symptoms; see ROS for more details.   No further questions or acute concerns identified.    Lab Review:   3/15: BMP with K 3.4, GFR 83 (baseline seems in 60s-80s); phos 1.7; Mg 2.1; CBC with WBC 10.69 (trend down, recent peak 13.94 on 3/14), Hb 7.8 (normocytic, recent baseline seems around 8-10), plt 141 (stable; baseline WNL); recent TSH WNL; recent A1c 5.5 (5.9 prior); recent iron low at 27; no recent B12 level on file  3/16: CMP generally stable, AST 48, alk phos 130; CBC with Hb 9.1  3/18: BMP generally stable/non-actionable; CBC with Hb 8.8        CT head 3/13/24: \"No acute intracranial abnormality.  Chronic pansinusitis. Bilateral mild mastoiditis.\"  CXR 2/21/24: \"No acute cardiopulmonary disease.  New portacatheter compared to prior study of 6/13/2023\"        EKG 12/12/23: reported as \"Sinus tachycardia at 104 bpm.  Otherwise, normal EKG. \"; image not accessible  Echo Oct 2023: EF 75, hyperdynamic systolic function, grade 1 diastolic dysfunction       The following portions of the patient's history were reviewed and updated as appropriate: allergies, current medications, past family history, past medical history, past social history, past surgical history and problem list.    Review of Systems     Review of Systems   Constitutional:  Negative for appetite change (generally chronically low but stable), chills, diaphoresis and fever.   HENT:  Negative for drooling, ear pain, hearing loss, rhinorrhea, sore throat and trouble swallowing.    Eyes:  Negative for pain, discharge, redness, itching and visual disturbance.   Respiratory:  Negative for cough, chest tightness, shortness of breath and wheezing.    Cardiovascular:  Negative for chest pain, palpitations and leg swelling.   Gastrointestinal:  Positive for abdominal pain (none at rest but present with movements, overall improving post-op). Negative for blood in stool, constipation, diarrhea, nausea " and vomiting.   Genitourinary:  Negative for difficulty urinating, dysuria, hematuria and urgency.   Musculoskeletal:  Positive for gait problem. Negative for arthralgias and back pain.   Skin:  Negative for color change.   Neurological:  Positive for weakness (improving). Negative for dizziness, facial asymmetry, speech difficulty, light-headedness and headaches.   Psychiatric/Behavioral:  Negative for agitation, behavioral problems and confusion. The patient is not nervous/anxious and is not hyperactive.    All other systems reviewed and are negative.      Active Problem List     Patient Active Problem List   Diagnosis    Primary hypertension    Mixed hyperlipidemia    Malignant neoplasm of right female breast (HCC)    Vitamin D deficiency    Prediabetes    Right thyroid nodule    GERD (gastroesophageal reflux disease)    Obesity    Metastatic colon cancer to liver (HCC)    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HCC)    Poor appetite    Anemia    Dehydration    Drug-induced constipation    Chemotherapy induced neutropenia     Stage 3a chronic kidney disease (HCC)    Thrombocytopenia (HCC)    Neuropathy    Diastolic dysfunction    Hypokalemia    Leukemoid reaction    GOGO (acute kidney injury) (HCC)    Acute post-operative pain    At risk for constipation    At risk for delirium    Advance care planning    Physical deconditioning    History of CVA (cerebrovascular accident)    Chronic nasal congestion       Objective     Vital Signs:     BP: 147/73 mmHg  3/18/2024 07:13 Temp:98.9 °F  3/18/2024 07:13   Pulse:103 bpm  3/18/2024 07:13 Weight:227.9 Lbs  3/18/2024 05:51   Resp:18 Breaths/min  3/18/2024 07:13 BS: O2:92 %  3/18/2024 07:13 Pain:5  3/18/2024 08:31       Physical Exam  Vitals reviewed.   Constitutional:       General: She is not in acute distress.     Appearance: She is obese. She is not toxic-appearing or diaphoretic.   HENT:      Head: Normocephalic and atraumatic.      Right Ear: External ear normal.       Left Ear: External ear normal.      Nose: Nose normal. No rhinorrhea.      Mouth/Throat:      Mouth: Mucous membranes are moist.      Pharynx: Oropharynx is clear. No posterior oropharyngeal erythema.   Eyes:      General: No scleral icterus.        Right eye: No discharge.         Left eye: No discharge.      Extraocular Movements: Extraocular movements intact.      Conjunctiva/sclera: Conjunctivae normal.      Pupils: Pupils are equal, round, and reactive to light.   Cardiovascular:      Rate and Rhythm: Normal rate and regular rhythm.   Pulmonary:      Effort: Pulmonary effort is normal. No respiratory distress.      Breath sounds: Normal breath sounds. No wheezing or rales.   Abdominal:      General: Bowel sounds are normal.      Palpations: Abdomen is soft.      Tenderness: There is no abdominal tenderness. There is no guarding.      Comments: Laparoscopic operative sites over abdomen with glue, appear healing without erythema, bleed, or drainage   Musculoskeletal:         General: No swelling or tenderness.      Cervical back: No rigidity.      Comments: Trace bilateral lower extremity edema   Skin:     General: Skin is warm and dry.      Coloration: Skin is not jaundiced.   Neurological:      General: No focal deficit present.      Mental Status: She is alert and oriented to person, place, and time. Mental status is at baseline.   Psychiatric:         Mood and Affect: Mood normal.         Behavior: Behavior normal.         Thought Content: Thought content normal.         Judgment: Judgment normal.         Pertinent Laboratory/Diagnostic Studies:  Laboratory and Imaging studies reviewed. Full report in the paper chart.     Current Medications   Medications reviewed and updated in facility chart.      -Total time spent on this encounter today including documentation and workup review, face to face time, history and exam, and documentation/orders was approximately 30 minutes.  -This note will be copied to [facility]  EMR where vitals and medication orders are placed.    Stephan Love D.O.  Geriatric Medicine  3/18/2024 12:28 PM

## 2024-03-18 NOTE — ASSESSMENT & PLAN NOTE
Monitor BP - generally persistently above goal in 140s-160s systolic  Avoid hypotension  As of 3/18 titrate losartan from 50 mg daily to 50mg BID with holding parameters  No acute cardiac complaints

## 2024-03-18 NOTE — ASSESSMENT & PLAN NOTE
Diagnosed July 2023  S/p chemotherapy as of Jan 2024  Underwent elective robotic right hemicolectomy on 3/12/24 per surgical oncology  Pain control as appropriate  Follow up with Surgical Oncology outpatient

## 2024-03-18 NOTE — ASSESSMENT & PLAN NOTE
Current regimen including: acetaminophen 975mg TID, gabapentin 300mg TID, oxycodone 2.5mg q6hr PRN  Monitor pain - stable, overall gradually improving  Adjust/wean regimen as appropriate

## 2024-03-18 NOTE — ASSESSMENT & PLAN NOTE
Patient notes chronic stable nasal congestion for a long time, no recent/acute change  No acute associated respiratory symptoms such as acute cough/wheeze/SOB  Trial of flonase

## 2024-03-18 NOTE — ASSESSMENT & PLAN NOTE
Likely in setting of recent surgery  Has been mild, platelets >100  -monitor on routine labs - has been improving and now WNL  -monitor for acute bleed - no present signs  -consider further workup, Heme consult if persistent/worsening

## 2024-03-18 NOTE — ASSESSMENT & PLAN NOTE
recent WBC peak 13.94 on 3/14  Likely reactive in post-op setting  Monitor on routine labs - has improved to WNL  Monitor for signs of infection - no present acute signs

## 2024-03-18 NOTE — ASSESSMENT & PLAN NOTE
Hb historic baseline around 12, recent baseline seems around 8-10  Likely multifactorial with some component of CKD, iron deficiency, and now acutely in setting of recent surgery  S/p transfusion in hospital on 3/14  Recent iron low at 27  -monitor on routine labs - fairly stable  -monitor for acute bleed - no present signs  -start on ferrous sulfate PO qOD  -consider further workup, Heme consult if persistent/worsening  -transfuse PRN Hb <7

## 2024-03-18 NOTE — ASSESSMENT & PLAN NOTE
strike-out   3/18/2024 05:51 227.9 Lbs Bed dhoward (Manual)    strike-out   3/17/2024 05:16 229.1 Lbs Bed irivera (Manual)    strike-out   3/16/2024 05:02 229.0 Lbs Bed shazzard (Manual)    strike-out   3/15/2024 16:00 228.6 Lbs Bed jcarr (Manual)      Echo Oct 2023: EF 75, hyperdynamic systolic function, grade 1 diastolic dysfunction  Not appearing to be on diuretics at baseline; monitor for appropriateness of starting  Monitor daily weight - fairly stable, no alarming uptrend  Monitor volume status clinically - appears stable/euvolemic

## 2024-03-20 ENCOUNTER — NURSING HOME VISIT (OUTPATIENT)
Dept: GERIATRICS | Facility: OTHER | Age: 69
End: 2024-03-20
Payer: MEDICARE

## 2024-03-20 ENCOUNTER — HOME HEALTH ADMISSION (OUTPATIENT)
Dept: HOME HEALTH SERVICES | Facility: HOME HEALTHCARE | Age: 69
End: 2024-03-20

## 2024-03-20 VITALS
SYSTOLIC BLOOD PRESSURE: 139 MMHG | OXYGEN SATURATION: 96 % | DIASTOLIC BLOOD PRESSURE: 64 MMHG | TEMPERATURE: 97.5 F | HEART RATE: 105 BPM

## 2024-03-20 DIAGNOSIS — C78.7 METASTATIC COLON CANCER TO LIVER (HCC): Primary | ICD-10-CM

## 2024-03-20 DIAGNOSIS — Z86.73 HISTORY OF CVA (CEREBROVASCULAR ACCIDENT): ICD-10-CM

## 2024-03-20 DIAGNOSIS — I10 PRIMARY HYPERTENSION: ICD-10-CM

## 2024-03-20 DIAGNOSIS — R53.81 PHYSICAL DECONDITIONING: ICD-10-CM

## 2024-03-20 DIAGNOSIS — C18.9 METASTATIC COLON CANCER TO LIVER (HCC): Primary | ICD-10-CM

## 2024-03-20 DIAGNOSIS — G62.9 NEUROPATHY: ICD-10-CM

## 2024-03-20 PROCEDURE — 99309 SBSQ NF CARE MODERATE MDM 30: CPT

## 2024-03-20 RX ORDER — LOSARTAN POTASSIUM 50 MG/1
50 TABLET ORAL 2 TIMES DAILY
Start: 2024-03-20

## 2024-03-20 NOTE — ASSESSMENT & PLAN NOTE
Diagnosed July 2023  S/p chemotherapy as of Jan 2024  Underwent elective robotic right hemicolectomy on 3/12/24 per surgical oncology  Pain controlled with Tylenol ATC, oxycodone 2.5 mg every 6 hours as needed, and gabapentin 300 mg 3 times daily  Follow up with Surgical Oncology outpatient-scheduled 4/2/24

## 2024-03-20 NOTE — PROGRESS NOTES
[facility]  [address] [facility] [facility] [address]  [phone]  FACILITY: Transitional Care Facility  Code 31 (STR)        NAME: Maira Moura  AGE: 69 y.o. SEX: female CODE STATUS: CPR    DATE OF ENCOUNTER: 3/20/2024    Assessment and Plan     1. Metastatic colon cancer to liver (HCC)  Assessment & Plan:  Diagnosed July 2023  S/p chemotherapy as of Jan 2024  Underwent elective robotic right hemicolectomy on 3/12/24 per surgical oncology  Pain controlled with Tylenol ATC, oxycodone 2.5 mg every 6 hours as needed, and gabapentin 300 mg 3 times daily  Follow up with Surgical Oncology outpatient-scheduled 4/2/24      2. Primary hypertension  Assessment & Plan:  Blood pressure acceptable  Losartan was increased from 50 mg daily to 50 mg twice daily  Continue current dose  Continue hold parameters  Avoid hypotension  Monitor blood pressure    Orders:  -     losartan (COZAAR) 50 mg tablet; Take 1 tablet (50 mg total) by mouth 2 (two) times a day    3. History of CVA (cerebrovascular accident)  Assessment & Plan:  History of stroke in 2017  Continue statin  Not currently on aspirin- unclear why, defer to PCP      4. Physical deconditioning  Assessment & Plan:  In the setting of metastatic cancer, recent hospitalization, surgery  Continue PT/OT  Maintain fall and safety precautions   following for discharge planning      5. Neuropathy  Assessment & Plan:  Chemo induced neuropathy  Continue gabapentin 300 mg TID           All medications and routine orders were reviewed and updated as needed.    Chief Complaint     STR follow up visit    Past Medical and Surgica History      Past Medical History:   Diagnosis Date    Anemia     Arthritis     Body mass index (BMI) 40.0-44.9, adult (HCC)     Breast cancer (HCC) 12/17/2018    Cancer (HCC)     right breast, colon, liver, right tonsil    Cervical lymphadenopathy     CT neck on 3/30/2023- Large right level 2A lymphadenopathy as described above and  suspicious for neoplasm.  Correlation with histopathology is recommended.  Mild asymmetric prominence of the right palatine tonsil with otherwise no definitive nodular enhancing lesions identified along the course of the aerodigestive tract.     5/26/23- FNA of this node was nonrevealing for tissue etiology, but it d    Encounter for screening for HIV 07/07/2022    Follow-up examination 04/04/2023    GERD (gastroesophageal reflux disease)     Hyperlipidemia     Hypertension     Obesity     Stroke (HCC)     TIA     Stroke (HCC)     TIA     Transaminitis 09/22/2021    Vasomotor rhinitis     Refilled flonase today. Stopped taking since January 2022     Past Surgical History:   Procedure Laterality Date    BREAST BIOPSY Right 11/23/2018    us guided bx cancer    BREAST SURGERY      COLONOSCOPY      ESOPHAGOSCOPY N/A 07/20/2023    Procedure: ESOPHAGOSCOPY;  Surgeon: David Chapa MD;  [facility];  Service: ENT    HEMICOLOECTOMY W/ ANASTOMOSIS Right 3/12/2024    Procedure: RIGHT COLECTOMY WITH ROBOTICS;  Surgeon: Vickie Israel MD;  Location: [facility];  Service: Surgical Oncology    IR BIOPSY LIVER MASS  06/27/2023    IR PORT CHECK  01/03/2024    IR PORT PLACEMENT  07/28/2023    IR PORT STRIPPING  01/09/2024    LAPAROTOMY N/A 3/12/2024    Procedure: LAPAROTOMY EXPLORATORY W/ ROBOTICS;  Surgeon: Vickie Israel MD;  [facility];  Service: Surgical Oncology    [place] [facility] [place] [place] [place]/CELL WASHG SPX N/A 07/20/2023    Procedure: BRONCHOSCOPY;  Surgeon: David Chapa MD;  [facility];  Service: ENT    AZ LARYNGOSCOPY W/BIOPSY MICROSCOPE/TELESCOPE N/A 07/20/2023    Procedure: MICRODIRECT LARYNGOSCOPY WITH BIOPSY;  Surgeon: David Chapa MD;  [facility];  Service: ENT    AZ MASTECTOMY PARTIAL W/AXILLARY LYMPHADENECTOMY Right 12/20/2018    Procedure: ULTRASOUND LOCALIZED PARTIAL MASTECTOMY W/SENTINEL NODE BIOPSY POSS AXILLARY DISSECTION;  Surgeon: Zahida Coronado  MD;  Location:  MAIN OR;  Service: General    TUBAL LIGATION      US GUIDED BREAST BIOPSY RIGHT COMPLETE Right 11/23/2018    US GUIDED INJECTION FOR RESEARCH STUDY  12/20/2018    US GUIDED INJECTION FOR RESEARCH STUDY  12/07/2018    US GUIDED INJECTION FOR RESEARCH STUDY  05/03/2019    US GUIDED LYMPH NODE BIOPSY RIGHT  05/26/2023     No Known Allergies       History of Present Illness     Patient is a 69 y.o. old female being seen at [facility] for follow up of acute and chronic medical conditions. Patient seen and examined while resting in chair without acute distress. She denies CP/SOB/N/V/D. She reports ongoing neuropathy in her hands and feet since chemo. Reports working with PT today and ambulating with rolling walker. She said she gets dizzy occasionally with movement but subsides quickly. Reports transient dizziness was happening prior to hospitalization. No dizziness with head movement.           The patient's allergies, past medical, surgical, social and family history were reviewed and unchanged.    Review of Systems     Review of Systems   Musculoskeletal:  Positive for gait problem.   Neurological:  Positive for weakness and numbness.        Neuropathy hands and feet   All other systems reviewed and are negative.        Objective     Vitals:   Vitals:    03/20/24 1201   BP: 139/64   Pulse: 105   Temp: 97.5 °F (36.4 °C)   SpO2: 96%       Labs Reviewed  CBC:   Results from Last 12 Months   Lab Units 03/18/24  0710   WBC Thousand/uL 7.89   RBC Million/uL 3.04*   HEMOGLOBIN g/dL 8.8*   HEMATOCRIT % 27.8*   MCV fL 91   MCH pg 28.9   MCHC g/dL 31.7   RDW % 16.4*   MPV fL 10.2   PLATELETS Thousands/uL 229   NRBC AUTO /100 WBCs 0   NEUTROS PCT % 65   LYMPHS PCT % 19   MONOS PCT % 11   EOS PCT % 4   BASOS PCT % 0   NEUTROS ABS Thousands/µL 5.18   LYMPHS ABS Thousands/µL 1.46   MONOS ABS Thousand/µL 0.84   EOS ABS Thousand/µL 0.33     Chemistry Profile:   Results from Last 12 Months   Lab Units 03/18/24  0710  03/16/24  0930 03/15/24  0439 03/12/24 2033 03/12/24  1926 01/22/24  1130 01/06/24  1133   POTASSIUM mmol/L 4.0 3.7 3.4*   < >  --    < > 4.1   CHLORIDE mmol/L 107 107 108   < >  --    < > 108   CO2 mmol/L 25 23 24   < >  --    < > 25   CO2, I-STAT mmol/L  --   --   --   --  26   < >  --    BUN mg/dL 10 8 13   < >  --    < > 10   CREATININE mg/dL 0.55* 0.57* 0.74   < >  --    < > 1.10   GLUCOSE FASTING mg/dL  --   --   --   --   --   --  95   GLUCOSE RANDOM mg/dL 77 79 78   < >  --    < >  --    GLUCOSE, ISTAT mg/dl  --   --   --   --  209*   < >  --    CALCIUM mg/dL 8.5 7.9* 7.8*   < >  --    < > 9.2   CORRECTED CALCIUM mg/dL  --  8.9  --   --   --   --   --    MAGNESIUM mg/dL  --   --  2.1   < >  --   --   --    PHOSPHORUS mg/dL  --   --  1.7*   < >  --   --   --    AST U/L  --  48*  --    < >  --    < > 67*   ALT U/L  --  20  --    < >  --    < > 35   ALK PHOS U/L  --  130*  --    < >  --    < > 121*   EGFR ml/min/1.73sq m 96 95 83   < >  --    < > 52    < > = values in this interval not displayed.         Physical Exam  Vitals reviewed.   Constitutional:       General: She is not in acute distress.     Appearance: Normal appearance. She is not ill-appearing, toxic-appearing or diaphoretic.   HENT:      Head: Normocephalic and atraumatic.   Eyes:      Conjunctiva/sclera: Conjunctivae normal.   Cardiovascular:      Rate and Rhythm: Normal rate.      Pulses: Normal pulses.      Heart sounds: Normal heart sounds. No murmur heard.  Pulmonary:      Effort: Pulmonary effort is normal. No respiratory distress.      Breath sounds: Normal breath sounds.   Abdominal:      General: Bowel sounds are normal. There is no distension.      Palpations: Abdomen is soft.      Tenderness: There is no abdominal tenderness.   Musculoskeletal:      Right lower leg: No edema.      Left lower leg: No edema.   Skin:     General: Skin is warm and dry.   Neurological:      General: No focal deficit present.      Mental Status: She is alert  "and oriented to person, place, and time.      Motor: Weakness present.      Gait: Gait abnormal.   Psychiatric:         Mood and Affect: Mood normal.         Behavior: Behavior normal.         Thought Content: Thought content normal.       Pertinent Laboratory/Diagnostic Studies:   Reviewed in facility chart-stable      Current Medications   Medications reviewed and updated see facility MAR for details.      Current Outpatient Medications:     losartan (COZAAR) 50 mg tablet, Take 1 tablet (50 mg total) by mouth 2 (two) times a day, Disp: , Rfl:        Please note:  Voice-recognition software may have been used in the preparation of this document.  Occasional wrong word or \"sound-alike\" substitutions may have occurred due to the inherent limitations of voice recognition software.  Interpretation should be guided by context.         JOANN Canales    "

## 2024-03-20 NOTE — ASSESSMENT & PLAN NOTE
In the setting of metastatic cancer, recent hospitalization, surgery  Continue PT/OT  Maintain fall and safety precautions   following for discharge planning

## 2024-03-20 NOTE — ASSESSMENT & PLAN NOTE
Blood pressure acceptable  Losartan was increased from 50 mg daily to 50 mg twice daily  Continue current dose  Continue hold parameters  Avoid hypotension  Monitor blood pressure

## 2024-03-25 ENCOUNTER — NURSING HOME VISIT (OUTPATIENT)
Dept: GERIATRICS | Facility: OTHER | Age: 69
End: 2024-03-25
Payer: MEDICARE

## 2024-03-25 DIAGNOSIS — I51.89 DIASTOLIC DYSFUNCTION: ICD-10-CM

## 2024-03-25 DIAGNOSIS — G89.18 ACUTE POST-OPERATIVE PAIN: ICD-10-CM

## 2024-03-25 DIAGNOSIS — I10 PRIMARY HYPERTENSION: ICD-10-CM

## 2024-03-25 DIAGNOSIS — N18.31 ANEMIA DUE TO STAGE 3A CHRONIC KIDNEY DISEASE  (HCC): ICD-10-CM

## 2024-03-25 DIAGNOSIS — C18.9 METASTATIC COLON CANCER TO LIVER (HCC): Primary | ICD-10-CM

## 2024-03-25 DIAGNOSIS — R79.89 ELEVATED LFTS: ICD-10-CM

## 2024-03-25 DIAGNOSIS — D63.1 ANEMIA DUE TO STAGE 3A CHRONIC KIDNEY DISEASE  (HCC): ICD-10-CM

## 2024-03-25 DIAGNOSIS — C78.7 METASTATIC COLON CANCER TO LIVER (HCC): Primary | ICD-10-CM

## 2024-03-25 PROCEDURE — 99309 SBSQ NF CARE MODERATE MDM 30: CPT | Performed by: STUDENT IN AN ORGANIZED HEALTH CARE EDUCATION/TRAINING PROGRAM

## 2024-03-25 NOTE — ASSESSMENT & PLAN NOTE
Current regimen including: acetaminophen 975mg TID, gabapentin 200mg TID, oxycodone 2.5mg q6hr PRN  Monitor pain - stable, overall gradually improving  Adjust/wean regimen as appropriate - plan to DC oxycodone by discharge as not requiring recently

## 2024-03-25 NOTE — ASSESSMENT & PLAN NOTE
Likely in setting of metastatic cancer and recent chemo and surgery  Monitor on routine labs  Consider further workup, GI consult if notably worsening or developing associated symptoms

## 2024-03-25 NOTE — ASSESSMENT & PLAN NOTE
[facility] BEHAVIORAL HEALTH EXTERNAL RESOURCES-[facility]   -PHONE NUMBER: [phone]     -[facility]    -PHONE NUMBER: [phone]    -[facility] [place]    PHONE NUMBER: [phone]    -[facility] COUNSELING SERVICES    -PHONE NUMBER: [phone]    -[facility]   PHONE NUMBER: [phone]    -[facility] BEHAVIORAL CLINICS    -PHONE NUMBER- [phone]    -[facility]   PHONE NUMBER: [phone]    -[facility]  PHONE NUMBER: [phone]        strike-out   3/25/2024 05:25 205.5 Lbs Bed shazzard (Manual)    strike-out   3/25/2024 05:24 202.4 Lbs Bed shazzard (Manual)    strike-out   3/24/2024 05:01 202.4 Lbs Bed shazzard (Manual)    strike-out   3/23/2024 05:12 202.2 Lbs Bed dhoward (Manual)    strike-out   3/22/2024 05:50 213.8 Lbs Bed dhoward (Manual)    strike-out   3/21/2024 05:19 221.3 Lbs Bed dhoward (Manual)    strike-out   3/20/2024 05:20 222.9 Lbs Bed npllana (Manual)    strike-out   3/19/2024 06:30 223.5 Lbs Bed mtrexler (Manual)    strike-out   3/18/2024 05:51 227.9 Lbs Bed dhoward (Manual)    strike-out   3/17/2024 05:16 229.1 Lbs Bed irivera (Manual)    strike-out   3/16/2024 05:02 229.0 Lbs Bed shazzard (Manual)    strike-out   3/15/2024 16:00 228.6 Lbs Bed jcarr (Manual)        Echo Oct 2023: EF 75, hyperdynamic systolic function, grade 1 diastolic dysfunction  Not appearing to be on diuretics at baseline; monitor for appropriateness of starting  Monitor daily weight - fairly stable (some apparent decreases on 3/22 and 3/23 seem more likely related to weighing scale changes rather than true weight decreased), no alarming uptrend  Monitor volume status clinically - appears stable/euvolemic, has mild peripheral edema at baseline per patient and no orthopnea

## 2024-03-25 NOTE — ASSESSMENT & PLAN NOTE
Hb historic baseline around 12, recent baseline seems around 8-10  Likely multifactorial with some component of CKD, iron deficiency, and now acutely in setting of recent surgery  S/p transfusion in hospital on 3/14  Recent iron low at 27  -monitor on routine labs - some recent downtrend  FOBT negative on 3/21  -monitor for acute bleed - no present signs  -started on ferrous sulfate PO qOD at rehab  -consider further workup, Heme consult if persistent/worsening  -transfuse PRN Hb <7

## 2024-03-25 NOTE — ASSESSMENT & PLAN NOTE
Monitor BP - had been generally persistently above goal in 140s-160s systolic - after losartan titration has been generally better around 120s-140s systolic, at times lower or higher  Avoid hypotension  As of 3/18 titrated losartan from 50 mg daily to 50mg BID with holding parameters - consider slightly backing off if some BP readings remain softer  No acute cardiac complaints

## 2024-03-25 NOTE — PROGRESS NOTES
[facility]  Facility: [facility]    PROGRESS NOTE  Nursing Home Place of Service: nursing home place of service: POS 31 Skilled Care-Part A Coverage    NAME: Maira Moura  : 1955 AGE: 69 y.o. SEX: female MRN: [id number]  DATE OF ENCOUNTER: 3/25/2024    Assessment and Plan     Problem List Items Addressed This Visit       Primary hypertension     Monitor BP - had been generally persistently above goal in 140s-160s systolic - after losartan titration has been generally better around 120s-140s systolic, at times lower or higher  Avoid hypotension  As of 3/18 titrated losartan from 50 mg daily to 50mg BID with holding parameters - consider slightly backing off if some BP readings remain softer  No acute cardiac complaints         Metastatic colon cancer to liver (HCC) - Primary     Diagnosed 2023  S/p chemotherapy as of 2024  Underwent elective robotic right hemicolectomy on 3/12/24 per surgical oncology  Pain control as appropriate  Follow up with Surgical Oncology outpatient         Anemia     Hb historic baseline around 12, recent baseline seems around 8-10  Likely multifactorial with some component of CKD, iron deficiency, and now acutely in setting of recent surgery  S/p transfusion in hospital on 3/14  Recent iron low at 27  -monitor on routine labs - some recent downtrend  FOBT negative on 3/21  -monitor for acute bleed - no present signs  -started on ferrous sulfate PO qOD at rehab  -consider further workup, Heme consult if persistent/worsening  -transfuse PRN Hb <7         Diastolic dysfunction     strike-out   3/25/2024 05:25 205.5 Lbs Bed shazzard (Manual)    strike-out   3/25/2024 05:24 202.4 Lbs Bed shazzard (Manual)    strike-out   3/24/2024 05:01 202.4 Lbs Bed shazzard (Manual)    strike-out   3/23/2024 05:12 202.2 Lbs Bed dhoward (Manual)    strike-out   3/22/2024 05:50 213.8 Lbs Bed dhoward (Manual)    strike-out   3/21/2024 05:19 221.3  Lbs Bed dhoward (Manual)    strike-out   3/20/2024 05:20 222.9 Lbs Bed npllana (Manual)    strike-out   3/19/2024 06:30 223.5 Lbs Bed mtrexler (Manual)    strike-out   3/18/2024 05:51 227.9 Lbs Bed dhoward (Manual)    strike-out   3/17/2024 05:16 229.1 Lbs Bed irivera (Manual)    strike-out   3/16/2024 05:02 229.0 Lbs Bed shazzard (Manual)    strike-out   3/15/2024 16:00 228.6 Lbs Bed jcarr (Manual)        Echo Oct 2023: EF 75, hyperdynamic systolic function, grade 1 diastolic dysfunction  Not appearing to be on diuretics at baseline; monitor for appropriateness of starting  Monitor daily weight - fairly stable (some apparent decreases on 3/22 and 3/23 seem more likely related to weighing scale changes rather than true weight decreased), no alarming uptrend  Monitor volume status clinically - appears stable/euvolemic, has mild peripheral edema at baseline per patient and no orthopnea         Acute post-operative pain     Current regimen including: acetaminophen 975mg TID, gabapentin 200mg TID, oxycodone 2.5mg q6hr PRN  Monitor pain - stable, overall gradually improving  Adjust/wean regimen as appropriate - plan to DC oxycodone by discharge as not requiring recently         Elevated LFTs     Likely in setting of metastatic cancer and recent chemo and surgery  Monitor on routine labs  Consider further workup, GI consult if notably worsening or developing associated symptoms                  Chief Complaint     Follow up post-op, pain, anemia    History of Present Illness     Maira Moura is a 69 y.o. female who was seen today for follow up. PMH including HTN, HLD, breast cancer, prediabetes, GERD, metastatic colon cancer (s/p chemo as of Jan 2024 and now s/p surgery), CKD3, right squamous cell carcinoma of tonsil, hx CVA     Patient seen and examined in room  Others present: none  Patient seated in chair  Appears comfortable, awake, alert, oriented to situation, able to converse appropriately  Polite, Aox3, notes  "she feels well again today with no acute concerns. Feels the weekend went well. Abdominal pain post-op, pain notably improving over time and much better overall, no pain at rest, still gets some pain when moving around or working with therapy but overal improving; she feels pain is controlled on present regimen and does not feel limited in her therapy due to her pain; amenable to weaning opioid and has not been needing it notably recently. Appetite stable, tolerating diet well. No N/V. Last BM yesterday was normal. Urinating fine. Breathing fine, has chronic nasal congestion stable. No acute cardiopulmonary, abdominal, or urinary symptoms; see ROS for more details.   No further questions or acute concerns identified.    Lab Review:   3/15: BMP with K 3.4, GFR 83 (baseline seems in 60s-80s); phos 1.7; Mg 2.1; CBC with WBC 10.69 (trend down, recent peak 13.94 on 3/14), Hb 7.8 (normocytic, recent baseline seems around 8-10), plt 141 (stable; baseline WNL); recent TSH WNL; recent A1c 5.5 (5.9 prior); recent iron low at 27; no recent B12 level on file  3/16: CMP generally stable, AST 48, alk phos 130; CBC with Hb 9.1  3/18: BMP generally stable/non-actionable; CBC with Hb 8.8  3/21: CMP generally stable, AST 50, alk phos 146; Hb 8.4; stool occult blood negative  3/25: CMP generally stable, AST 69 (trend up), alk phos 143; Hb 7.6        CT head 3/13/24: \"No acute intracranial abnormality.  Chronic pansinusitis. Bilateral mild mastoiditis.\"  CXR 2/21/24: \"No acute cardiopulmonary disease.  New portacatheter compared to prior study of 6/13/2023\"        EKG 12/12/23: reported as \"Sinus tachycardia at 104 bpm.  Otherwise, normal EKG. \"; image not accessible  Echo Oct 2023: EF 75, hyperdynamic systolic function, grade 1 diastolic dysfunction       The following portions of the patient's history were reviewed and updated as appropriate: allergies, current medications, past family history, past medical history, past social " history, past surgical history and problem list.    Review of Systems     Review of Systems   Constitutional:  Negative for appetite change (generally chronically low but stable), chills, diaphoresis and fever.   HENT:  Negative for drooling, ear pain, hearing loss, rhinorrhea, sore throat and trouble swallowing.    Eyes:  Negative for pain, discharge, redness, itching and visual disturbance.   Respiratory:  Negative for cough, chest tightness, shortness of breath and wheezing.    Cardiovascular:  Negative for chest pain, palpitations and leg swelling.   Gastrointestinal:  Negative for abdominal pain (none at rest but present with movements, overall improving notably post-op), blood in stool, constipation, diarrhea, nausea and vomiting.   Genitourinary:  Negative for difficulty urinating, dysuria, hematuria and urgency.   Musculoskeletal:  Positive for gait problem. Negative for arthralgias and back pain.   Skin:  Negative for color change.   Neurological:  Negative for dizziness, facial asymmetry, speech difficulty, weakness (improving), light-headedness and headaches.   Psychiatric/Behavioral:  Negative for agitation, behavioral problems and confusion. The patient is not nervous/anxious and is not hyperactive.    All other systems reviewed and are negative.      Active Problem List     Patient Active Problem List   Diagnosis    Primary hypertension    Mixed hyperlipidemia    Malignant neoplasm of right female breast (HCC)    Vitamin D deficiency    Prediabetes    Right thyroid nodule    GERD (gastroesophageal reflux disease)    Obesity    Metastatic colon cancer to liver (HCC)    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HCC)    Poor appetite    Anemia    Dehydration    Drug-induced constipation    Chemotherapy induced neutropenia     Stage 3a chronic kidney disease (HCC)    Thrombocytopenia (HCC)    Neuropathy    Diastolic dysfunction    Hypokalemia    Leukemoid reaction    GOGO (acute kidney injury) (HCC)    Acute  post-operative pain    At risk for constipation    At risk for delirium    Advance care planning    Physical deconditioning    History of CVA (cerebrovascular accident)    Chronic nasal congestion    Elevated LFTs       Objective     Vital Signs:     BP: 103/70 mmHg  3/25/2024 15:41   Temp:97.5 °F  3/25/2024 15:41 Pulse:95 bpm  3/25/2024 15:41   Weight:205.5 Lbs  3/25/2024 05:25   Resp:20 Breaths/min  3/25/2024 15:41 BS: O2:95 %  3/25/2024 15:41 Pain:0  3/24/2024 23:03       Physical Exam  Vitals reviewed.   Constitutional:       General: She is not in acute distress.     Appearance: She is obese. She is not toxic-appearing or diaphoretic.   HENT:      Head: Normocephalic and atraumatic.      Right Ear: External ear normal.      Left Ear: External ear normal.      Nose: Nose normal. No rhinorrhea.      Mouth/Throat:      Mouth: Mucous membranes are moist.      Pharynx: Oropharynx is clear. No posterior oropharyngeal erythema.   Eyes:      General: No scleral icterus.        Right eye: No discharge.         Left eye: No discharge.      Extraocular Movements: Extraocular movements intact.      Conjunctiva/sclera: Conjunctivae normal.      Pupils: Pupils are equal, round, and reactive to light.   Cardiovascular:      Rate and Rhythm: Normal rate and regular rhythm.   Pulmonary:      Effort: Pulmonary effort is normal. No respiratory distress.      Breath sounds: Normal breath sounds. No wheezing or rales.   Abdominal:      General: Bowel sounds are normal.      Palpations: Abdomen is soft.      Tenderness: There is no abdominal tenderness. There is no guarding.      Comments: Laparoscopic operative sites over abdomen with glue, appear healing without erythema, bleed, or drainage   Musculoskeletal:         General: No swelling or tenderness.      Cervical back: No rigidity.      Comments: 1+ bilateral lower extremity edema   Skin:     General: Skin is warm and dry.      Coloration: Skin is not jaundiced.   Neurological:       General: No focal deficit present.      Mental Status: She is alert and oriented to person, place, and time. Mental status is at baseline.   Psychiatric:         Mood and Affect: Mood normal.         Behavior: Behavior normal.         Thought Content: Thought content normal.         Judgment: Judgment normal.         Pertinent Laboratory/Diagnostic Studies:  Laboratory and Imaging studies reviewed. Full report in the paper chart.     Current Medications   Medications reviewed and updated in facility chart.      -Total time spent on this encounter today including documentation and workup review, face to face time, history and exam, and documentation/orders was approximately 30 minutes.  -This note will be copied to [facility] EMR where vitals and medication orders are placed.    Stephan Love D.O.  Geriatric Medicine  3/25/2024 4:03 PM

## 2024-03-26 ENCOUNTER — TELEPHONE (OUTPATIENT)
Dept: HEMATOLOGY ONCOLOGY | Facility: CLINIC | Age: 69
End: 2024-03-26

## 2024-03-26 NOTE — TELEPHONE ENCOUNTER
Spoke with Connie from [facility]. Patient will be discharged on 4/5. I called patient and left her a message with the new appt date and time of 4/12 at 3:40pm.

## 2024-03-26 NOTE — TELEPHONE ENCOUNTER
Patient Call    Who are you speaking with? [facility]     If it is not the patient, are they listed on an active communication consent form? N/A   What is the reason for this call? They ask if appt on 3/29/24 can be virtual   Does this require a call back? Yes   If a call back is required, please list [facility] call back number [phone]    If a call back is required, advise that a message will be forwarded to their care team and someone will return their call as soon as possible.   Did you relay this information to the patient? Yes

## 2024-03-27 ENCOUNTER — NURSING HOME VISIT (OUTPATIENT)
Dept: GERIATRICS | Facility: OTHER | Age: 69
End: 2024-03-27
Payer: MEDICARE

## 2024-03-27 VITALS
TEMPERATURE: 97.8 F | SYSTOLIC BLOOD PRESSURE: 114 MMHG | OXYGEN SATURATION: 97 % | HEART RATE: 95 BPM | DIASTOLIC BLOOD PRESSURE: 56 MMHG

## 2024-03-27 DIAGNOSIS — C18.9 METASTATIC COLON CANCER TO LIVER (HCC): ICD-10-CM

## 2024-03-27 DIAGNOSIS — I10 PRIMARY HYPERTENSION: ICD-10-CM

## 2024-03-27 DIAGNOSIS — D63.1 ANEMIA DUE TO STAGE 3A CHRONIC KIDNEY DISEASE  (HCC): Primary | ICD-10-CM

## 2024-03-27 DIAGNOSIS — Z86.73 HISTORY OF CVA (CEREBROVASCULAR ACCIDENT): ICD-10-CM

## 2024-03-27 DIAGNOSIS — C78.7 METASTATIC COLON CANCER TO LIVER (HCC): ICD-10-CM

## 2024-03-27 DIAGNOSIS — N18.31 ANEMIA DUE TO STAGE 3A CHRONIC KIDNEY DISEASE  (HCC): Primary | ICD-10-CM

## 2024-03-27 DIAGNOSIS — K59.03 DRUG-INDUCED CONSTIPATION: ICD-10-CM

## 2024-03-27 PROCEDURE — 99309 SBSQ NF CARE MODERATE MDM 30: CPT

## 2024-03-27 RX ORDER — FERROUS SULFATE 325(65) MG
325 TABLET ORAL
Start: 2024-03-27

## 2024-03-27 NOTE — ASSESSMENT & PLAN NOTE
Last BM yesterday  Continue bowel regimen   Currently on iron supplement   Encourage po hydration

## 2024-03-27 NOTE — ASSESSMENT & PLAN NOTE
Hemoglobin baseline recently peaked at time  Likely multifactorial in the setting of CKD, iron deficiency and recent surgery  Received 1 unit [facility] inpatient 3/14/2024  Occult stools have been negative  She was started on iron supplement every other day at rehab  Hemoglobin 7.7  Increase iron supplement frequency to daily  Repeat labs Friday  Transfuse as needed for hemoglobin less than 7

## 2024-03-27 NOTE — PROGRESS NOTES
[facility]  [address] [address]  [phone]  FACILITY: [facility]  Code 31 (STR)        NAME: Maira Moura  AGE: 69 y.o. SEX: female CODE STATUS: CPR    DATE OF ENCOUNTER: 3/27/2024    Assessment and Plan     1. Anemia due to stage 3a chronic kidney disease   Assessment & Plan:  Hemoglobin baseline recently peaked at time  Likely multifactorial in the setting of CKD, iron deficiency and recent surgery  Received 1 unit [facility] inpatient 3/14/2024  Occult stools have been negative  She was started on iron supplement every other day at rehab  Hemoglobin 7.7  Increase iron supplement frequency to daily  Repeat labs Friday  Transfuse as needed for hemoglobin less than 7    Orders:  -     ferrous sulfate 325 (65 Fe) mg tablet; Take 1 tablet (325 mg total) by mouth daily with breakfast    2. Drug-induced constipation  Assessment & Plan:  Last BM yesterday  Continue bowel regimen   Currently on iron supplement   Encourage po hydration       3. Primary hypertension  Assessment & Plan:  Blood pressure acceptable  As of 3/18 Losartan was increased from 50 mg daily to 50 mg twice daily  Continue current dose  Continue hold parameters  Avoid hypotension  Monitor blood pressure      4. Metastatic colon cancer to liver (HCC)  Assessment & Plan:  Diagnosed July 2023  S/p chemotherapy as of Jan 2024  Underwent elective robotic right hemicolectomy on 3/12/24 per surgical oncology  Pain controlled with Tylenol ATC, oxycodone 2.5 mg every 6 hours as needed, and gabapentin 300 mg 3 times daily  Follow up with Surgical Oncology outpatient-scheduled 4/2/24      5. History of CVA (cerebrovascular accident)  Assessment & Plan:  History of stroke in 2017  Continue secondary prevention with statin  Not currently on aspirin           All medications and routine orders were reviewed and updated as needed.    Chief Complaint     STR follow up visit    Past Medical and Surgica History      Past  Medical History:   Diagnosis Date    Anemia     Arthritis     Body mass index (BMI) 40.0-44.9, adult (HCC)     Breast cancer (HCC) 12/17/2018    Cancer (HCC)     right breast, colon, liver, right tonsil    Cervical lymphadenopathy     CT neck on 3/30/2023- Large right level 2A lymphadenopathy as described above and suspicious for neoplasm.  Correlation with histopathology is recommended.  Mild asymmetric prominence of the right palatine tonsil with otherwise no definitive nodular enhancing lesions identified along the course of the aerodigestive tract.     5/26/23- FNA of this node was nonrevealing for tissue etiology, but it d    Encounter for screening for HIV 07/07/2022    Follow-up examination 04/04/2023    GERD (gastroesophageal reflux disease)     Hyperlipidemia     Hypertension     Obesity     Stroke (HCC)     TIA     Stroke (HCC)     TIA     Transaminitis 09/22/2021    Vasomotor rhinitis     Refilled flonase today. Stopped taking since January 2022     Past Surgical History:   Procedure Laterality Date    BREAST BIOPSY Right 11/23/2018    us guided bx cancer    BREAST SURGERY      COLONOSCOPY      ESOPHAGOSCOPY N/A 07/20/2023    Procedure: ESOPHAGOSCOPY;  Surgeon: David Chapa MD;  [facility];  Service: ENT    HEMICOLOECTOMY W/ ANASTOMOSIS Right 3/12/2024    Procedure: RIGHT COLECTOMY WITH ROBOTICS;  Surgeon: Vickie Israel MD;  [facility];  Service: Surgical Oncology    IR BIOPSY LIVER MASS  06/27/2023    IR PORT CHECK  01/03/2024    IR PORT PLACEMENT  07/28/2023    IR PORT STRIPPING  01/09/2024    LAPAROTOMY N/A 3/12/2024    Procedure: LAPAROTOMY EXPLORATORY W/ ROBOTICS;  Surgeon: Vickie Israel MD;  [facility];  Service: Surgical Oncology    AL [facility] INCL FLUOR GDNCE DX W/CELL WASHG SPX N/A 07/20/2023    Procedure: BRONCHOSCOPY;  Surgeon: David Chapa MD;  [facility];  Service: ENT    AL LARYNGOSCOPY W/BIOPSY MICROSCOPE/TELESCOPE N/A 07/20/2023     Procedure: MICRODIRECT LARYNGOSCOPY WITH BIOPSY;  Surgeon: David Chapa MD;  [facility];  Service: ENT    ND MASTECTOMY PARTIAL W/AXILLARY LYMPHADENECTOMY Right 12/20/2018    Procedure: ULTRASOUND LOCALIZED PARTIAL MASTECTOMY W/SENTINEL NODE BIOPSY POSS AXILLARY DISSECTION;  Surgeon: Zahida Coronado MD;  Location:  MAIN OR;  Service: General    TUBAL LIGATION      US GUIDED BREAST BIOPSY RIGHT COMPLETE Right 11/23/2018    US GUIDED INJECTION FOR RESEARCH STUDY  12/20/2018    US GUIDED INJECTION FOR RESEARCH STUDY  12/07/2018    US GUIDED INJECTION FOR RESEARCH STUDY  05/03/2019    US GUIDED LYMPH NODE BIOPSY RIGHT  05/26/2023     No Known Allergies       History of Present Illness     Patient is a 69 y.o. old female being seen at [facility] for follow up of acute and chronic medical conditions. Patient seen and examined while resting in chair without acute distress. She denies CP/SOB/N/V/D. Denies any s/s of bleeding. Has received independent sign from PT to ambulate with rolling walker.           The patient's allergies, past medical, surgical, social and family history were reviewed and unchanged.    Review of Systems     Review of Systems   Musculoskeletal:  Positive for gait problem.   Neurological:  Positive for weakness.   All other systems reviewed and are negative.        Objective     Vitals:   Vitals:    03/27/24 1314   BP: 114/56   Pulse: 95   Temp: 97.8 °F (36.6 °C)   SpO2: 97%       Labs Reviewed  CBC:   Results from Last 12 Months   Lab Units 03/27/24  0722 03/21/24  0400 03/18/24  0710   WBC Thousand/uL  --   --  7.89   RBC Million/uL  --   --  3.04*   HEMOGLOBIN g/dL 7.7*   < > 8.8*   HEMATOCRIT % 23.9*   < > 27.8*   MCV fL  --   --  91   MCH pg  --   --  28.9   MCHC g/dL  --   --  31.7   RDW %  --   --  16.4*   MPV fL  --   --  10.2   PLATELETS Thousands/uL  --   --  229   NRBC AUTO /100 WBCs  --   --  0   NEUTROS PCT %  --   --  65   LYMPHS PCT %  --   --  19   MONOS PCT %  --   --   11   EOS PCT %  --   --  4   BASOS PCT %  --   --  0   NEUTROS ABS Thousands/µL  --   --  5.18   LYMPHS ABS Thousands/µL  --   --  1.46   MONOS ABS Thousand/µL  --   --  0.84   EOS ABS Thousand/µL  --   --  0.33    < > = values in this interval not displayed.         Physical Exam  Vitals reviewed.   Constitutional:       General: She is not in acute distress.     Appearance: Normal appearance. She is not ill-appearing, toxic-appearing or diaphoretic.   HENT:      Head: Normocephalic and atraumatic.   Eyes:      Conjunctiva/sclera: Conjunctivae normal.   Cardiovascular:      Rate and Rhythm: Normal rate.      Pulses: Normal pulses.      Heart sounds: Normal heart sounds. No murmur heard.  Pulmonary:      Effort: Pulmonary effort is normal. No respiratory distress.      Breath sounds: Normal breath sounds.   Abdominal:      General: Bowel sounds are normal. There is no distension.      Palpations: Abdomen is soft.      Tenderness: There is no abdominal tenderness.   Musculoskeletal:      Right lower leg: No edema.      Left lower leg: No edema.   Skin:     General: Skin is warm and dry.   Neurological:      General: No focal deficit present.      Mental Status: She is alert and oriented to person, place, and time.      Motor: Weakness present.      Gait: Gait abnormal.   Psychiatric:         Mood and Affect: Mood normal.         Behavior: Behavior normal.         Thought Content: Thought content normal.         Pertinent Laboratory/Diagnostic Studies:   Reviewed in facility chart-stable      Current Medications   Medications reviewed and updated see facility MAR for details.      Current Outpatient Medications:     ferrous sulfate 325 (65 Fe) mg tablet, Take 1 tablet (325 mg total) by mouth daily with breakfast, Disp: , Rfl:     losartan (COZAAR) 50 mg tablet, Take 1 tablet (50 mg total) by mouth 2 (two) times a day, Disp: , Rfl:        Please note:  Voice-recognition software may have been used in the preparation of  "this document.  Occasional wrong word or \"sound-alike\" substitutions may have occurred due to the inherent limitations of voice recognition software.  Interpretation should be guided by context.         JOANN Canales    "

## 2024-03-27 NOTE — ASSESSMENT & PLAN NOTE
Blood pressure acceptable  As of 3/18 Losartan was increased from 50 mg daily to 50 mg twice daily  Continue current dose  Continue hold parameters  Avoid hypotension  Monitor blood pressure

## 2024-03-29 ENCOUNTER — NURSING HOME VISIT (OUTPATIENT)
Dept: GERIATRICS | Facility: OTHER | Age: 69
End: 2024-03-29
Payer: MEDICARE

## 2024-03-29 DIAGNOSIS — I10 PRIMARY HYPERTENSION: ICD-10-CM

## 2024-03-29 DIAGNOSIS — C78.7 METASTATIC COLON CANCER TO LIVER (HCC): ICD-10-CM

## 2024-03-29 DIAGNOSIS — N18.31 ANEMIA DUE TO STAGE 3A CHRONIC KIDNEY DISEASE  (HCC): Primary | ICD-10-CM

## 2024-03-29 DIAGNOSIS — K21.9 GASTROESOPHAGEAL REFLUX DISEASE, UNSPECIFIED WHETHER ESOPHAGITIS PRESENT: ICD-10-CM

## 2024-03-29 DIAGNOSIS — C18.9 METASTATIC COLON CANCER TO LIVER (HCC): ICD-10-CM

## 2024-03-29 DIAGNOSIS — D63.1 ANEMIA DUE TO STAGE 3A CHRONIC KIDNEY DISEASE  (HCC): Primary | ICD-10-CM

## 2024-03-29 DIAGNOSIS — R53.81 PHYSICAL DECONDITIONING: ICD-10-CM

## 2024-03-29 PROCEDURE — 99309 SBSQ NF CARE MODERATE MDM 30: CPT

## 2024-03-29 NOTE — ASSESSMENT & PLAN NOTE
In the setting of metastatic cancer, recent hospitalization, surgery  Continue PT/OT  Maintain fall and safety precautions   following for discharge planning   bradycardia

## 2024-04-01 ENCOUNTER — DOCUMENTATION (OUTPATIENT)
Dept: HEMATOLOGY ONCOLOGY | Facility: CLINIC | Age: 69
End: 2024-04-01

## 2024-04-01 ENCOUNTER — NURSING HOME VISIT (OUTPATIENT)
Dept: GERIATRICS | Facility: OTHER | Age: 69
End: 2024-04-01
Payer: MEDICARE

## 2024-04-01 VITALS
SYSTOLIC BLOOD PRESSURE: 130 MMHG | HEART RATE: 88 BPM | TEMPERATURE: 97.7 F | DIASTOLIC BLOOD PRESSURE: 60 MMHG | OXYGEN SATURATION: 93 %

## 2024-04-01 VITALS
DIASTOLIC BLOOD PRESSURE: 77 MMHG | SYSTOLIC BLOOD PRESSURE: 136 MMHG | OXYGEN SATURATION: 98 % | TEMPERATURE: 97.9 F | HEART RATE: 95 BPM

## 2024-04-01 DIAGNOSIS — I10 PRIMARY HYPERTENSION: ICD-10-CM

## 2024-04-01 DIAGNOSIS — K21.9 GASTROESOPHAGEAL REFLUX DISEASE, UNSPECIFIED WHETHER ESOPHAGITIS PRESENT: ICD-10-CM

## 2024-04-01 DIAGNOSIS — D63.1 ANEMIA DUE TO STAGE 3A CHRONIC KIDNEY DISEASE  (HCC): ICD-10-CM

## 2024-04-01 DIAGNOSIS — C78.7 METASTATIC COLON CANCER TO LIVER (HCC): Primary | ICD-10-CM

## 2024-04-01 DIAGNOSIS — C18.9 COLON ADENOCARCINOMA (HCC): Primary | ICD-10-CM

## 2024-04-01 DIAGNOSIS — E78.2 MIXED HYPERLIPIDEMIA: ICD-10-CM

## 2024-04-01 DIAGNOSIS — K59.03 DRUG-INDUCED CONSTIPATION: ICD-10-CM

## 2024-04-01 DIAGNOSIS — N17.9 AKI (ACUTE KIDNEY INJURY) (HCC): ICD-10-CM

## 2024-04-01 DIAGNOSIS — C09.9 RIGHT TONSILLAR SQUAMOUS CELL CARCINOMA (HCC): ICD-10-CM

## 2024-04-01 DIAGNOSIS — N18.31 ANEMIA DUE TO STAGE 3A CHRONIC KIDNEY DISEASE  (HCC): ICD-10-CM

## 2024-04-01 DIAGNOSIS — N18.31 STAGE 3A CHRONIC KIDNEY DISEASE (HCC): ICD-10-CM

## 2024-04-01 DIAGNOSIS — E87.6 HYPOKALEMIA: ICD-10-CM

## 2024-04-01 DIAGNOSIS — I51.89 DIASTOLIC DYSFUNCTION: ICD-10-CM

## 2024-04-01 DIAGNOSIS — C18.9 METASTATIC COLON CANCER TO LIVER (HCC): Primary | ICD-10-CM

## 2024-04-01 DIAGNOSIS — R53.81 PHYSICAL DECONDITIONING: ICD-10-CM

## 2024-04-01 DIAGNOSIS — Z86.73 HISTORY OF CVA (CEREBROVASCULAR ACCIDENT): ICD-10-CM

## 2024-04-01 DIAGNOSIS — D69.6 THROMBOCYTOPENIA (HCC): ICD-10-CM

## 2024-04-01 PROCEDURE — 99316 NF DSCHRG MGMT 30 MIN+: CPT

## 2024-04-01 RX ORDER — LOSARTAN POTASSIUM 50 MG/1
50 TABLET ORAL 2 TIMES DAILY
Qty: 60 TABLET | Refills: 0 | Status: SHIPPED | OUTPATIENT
Start: 2024-04-01

## 2024-04-01 RX ORDER — FERROUS SULFATE 325(65) MG
325 TABLET ORAL
Qty: 30 TABLET | Refills: 0 | Status: SHIPPED | OUTPATIENT
Start: 2024-04-01

## 2024-04-01 NOTE — ASSESSMENT & PLAN NOTE
Diagnosed July 2023  S/p chemotherapy as of Jan 2024  Underwent elective robotic right hemicolectomy on 3/12/24 per surgical oncology  Pain controlled with Tylenol ATC and gabapentin 300 mg 3 times daily  Oxycodone discontinued- nonuse   Follow up with Surgical Oncology outpatient-scheduled 4/2/24

## 2024-04-01 NOTE — PROGRESS NOTES
[facility]  [address] [address]  [phone]  FACILITY: [facility]itional Care Facility  Code 31 (STR)  Discharge Note      NAME: Maira Moura  AGE: 69 y.o. SEX: female CODE STATUS: CPR    DATE OF ENCOUNTER: 4/1/2024    ADMISSION DATE: 3/15/2024    DISCHARGE DATE: 4/1/2024    Assessment and Plan     1. Metastatic colon cancer to liver (HCC)  Assessment & Plan:  Diagnosed July 2023  S/p chemotherapy as of Jan 2024  Underwent elective robotic right hemicolectomy on 3/12/24 per surgical oncology  Pain controlled with Tylenol ATC and gabapentin 300 mg 3 times daily  Oxycodone discontinued- nonuse   Follow up with Surgical Oncology outpatient-scheduled 4/2/24      2. Primary hypertension  Assessment & Plan:  Blood pressure acceptable  As of 3/18 Losartan was increased from 50 mg daily to 50 mg twice daily  Continue current dose  Continue hold parameters  Avoid hypotension  Monitor blood pressure    Orders:  -     losartan (COZAAR) 50 mg tablet; Take 1 tablet (50 mg total) by mouth 2 (two) times a day    3. 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HCC)  Assessment & Plan:  Diagnosed July 2023  Following with Oncology outpatient      4. Drug-induced constipation  Assessment & Plan:  stable  Continue bowel regimen   Currently on iron supplement   Encourage po hydration       5. Gastroesophageal reflux disease, unspecified whether esophagitis present  Assessment & Plan:  Stable   Continue PPI      6. GOGO (acute kidney injury) (HCC)  Assessment & Plan:  Noted in hospital in post-op setting   Has since resolved  See CKD plan      7. Stage 3a chronic kidney disease (HCC)  Assessment & Plan:  Stable   Avoid nephrotoxins, NSAID's  Avoid hypotension   Encourage adequate po hydration   Monitor routine labs   F/u with PCP      8. Thrombocytopenia (HCC)  Assessment & Plan:  Likely in the setting of recent surgery, has since improved  Platelets 337 today         9. Physical deconditioning  Assessment &  Plan:  In the setting of metastatic cancer, recent hospitalization, surgery  Maintain fall and safety precautions        10. Anemia due to stage 3a chronic kidney disease  (HCC)  Assessment & Plan:  Likely multifactorial in the setting of CKD, iron deficiency and recent surgery  Received 1 unit [facility] inpatient 3/14/2024  Occult stools have been negative  She was started on iron supplement every other day at rehab, as of 3/28 changed to daily   Hemoglobin uptrending; 7.8 today   F/u with PCP    Orders:  -     ferrous sulfate 325 (65 Fe) mg tablet; Take 1 tablet (325 mg total) by mouth daily with breakfast    11. History of CVA (cerebrovascular accident)  Assessment & Plan:  History of stroke in 2017  Continue secondary prevention with statin  Not currently on aspirin      12. Diastolic dysfunction  Assessment & Plan:  Appears euvolemic   Last Echo 10/2023 EF 75%  Not currently on diuretics or beta blocker  Monitor weights  F/u PCP        13. Hypokalemia  Assessment & Plan:  Resolved  Continue potassium supplement      14. Mixed hyperlipidemia  Assessment & Plan:  Continue statin  Follow up with PCP         Discharge Statement:   I spent 45 minutes discharging the patient. This time was spent on the day of discharge. I had direct contact with the patient on the day of discharge. Greater than 50% of the total time was spent examining patient, answering all patient questions, arranging and discussing plan of care with patient as well as directly providing post-discharge instructions. Additional time then spent on discharge activities.     Discharge Disposition: Home    All medications and routine orders were reviewed and updated as needed.    Chief Complaint     STR Discharge    Past Medical and Surgica History      Past Medical History:   Diagnosis Date    Anemia     Arthritis     Body mass index (BMI) 40.0-44.9, adult (HCC)     Breast cancer (HCC) 12/17/2018    Cancer (HCC)     right breast, colon, liver, right tonsil     Cervical lymphadenopathy     CT neck on 3/30/2023- Large right level 2A lymphadenopathy as described above and suspicious for neoplasm.  Correlation with histopathology is recommended.  Mild asymmetric prominence of the right palatine tonsil with otherwise no definitive nodular enhancing lesions identified along the course of the aerodigestive tract.     5/26/23- FNA of this node was nonrevealing for tissue etiology, but it d    Encounter for screening for HIV 07/07/2022    Follow-up examination 04/04/2023    GERD (gastroesophageal reflux disease)     Hyperlipidemia     Hypertension     Obesity     Stroke (HCC)     TIA     Stroke (HCC)     TIA     Transaminitis 09/22/2021    Vasomotor rhinitis     Refilled flonase today. Stopped taking since January 2022     Past Surgical History:   Procedure Laterality Date    BREAST BIOPSY Right 11/23/2018    us guided bx cancer    BREAST SURGERY      COLONOSCOPY      ESOPHAGOSCOPY N/A 07/20/2023    Procedure: ESOPHAGOSCOPY;  Surgeon: David Chapa MD;  [facility];  Service: ENT    HEMICOLOECTOMY W/ ANASTOMOSIS Right 3/12/2024    Procedure: RIGHT COLECTOMY WITH ROBOTICS;  Surgeon: Vickie Israel MD;  [facility];  Service: Surgical Oncology    IR BIOPSY LIVER MASS  06/27/2023    IR PORT CHECK  01/03/2024    IR PORT PLACEMENT  07/28/2023    IR PORT STRIPPING  01/09/2024    LAPAROTOMY N/A 3/12/2024    Procedure: LAPAROTOMY EXPLORATORY W/ ROBOTICS;  Surgeon: Vickie Israel MD;  [facility];  Service: Surgical Oncology    [facility]/CELL [facility]ASHG SPX N/A 07/20/2023    Procedure: BRONCHOSCOPY;  Surgeon: David Chapa MD;  [facility];  Service: ENT    AR LARYNGOSCOPY W/BIOPSY MICROSCOPE/TELESCOPE N/A 07/20/2023    Procedure: MICRODIRECT LARYNGOSCOPY WITH BIOPSY;  Surgeon: David Chapa MD;  [facility];  Service: ENT    AR MASTECTOMY PARTIAL W/AXILLARY LYMPHADENECTOMY Right 12/20/2018    Procedure:  ULTRASOUND LOCALIZED PARTIAL MASTECTOMY W/SENTINEL NODE BIOPSY POSS AXILLARY DISSECTION;  Surgeon: Zahida Coronado MD;  Location:  MAIN OR;  Service: General    TUBAL LIGATION      US GUIDED BREAST BIOPSY RIGHT COMPLETE Right 11/23/2018    US GUIDED INJECTION FOR RESEARCH STUDY  12/20/2018    US GUIDED INJECTION FOR RESEARCH STUDY  12/07/2018    US GUIDED INJECTION FOR RESEARCH STUDY  05/03/2019    US GUIDED LYMPH NODE BIOPSY RIGHT  05/26/2023     No Known Allergies       History of Present Illness     Patient being seen for discharge from SNF.  She has past medical history of but not limited to hypertension, hyperlipidemia, breast cancer, GERD, metastatic colon cancer status post chemo, CKD 3, and CVA.    Hospital course:  Patient was admitted at [facility] from 3/12/2024 to 3/15/2024 for elective robotic right hemicolectomy.  Patient's postoperative course was complicated by GOGO for which she was treated with IV fluids.  Diet was advanced as tolerated and Pope catheter was removed postoperatively as well.    Rehab course:  Rehab course was complicated by anemia. Patient was initiated on iron supplement. Occult stools remained negative. No evidence of bleed. She participated in comprehensive physical and occupational therapy at rehab. Patient now independent with rolling walker at rehab.     Patient seen and examined in room today while seated in chair without acute distress. Denies CP, SOB, nausea, vomiting, diarrhea, constipation, headaches or dizziness. She is agreeable to scheduling follow up with PCP on discharge. She will be discharged home today with family transport.           The patient's allergies, past medical, surgical, social and family history were reviewed and unchanged.    Review of Systems     Review of Systems   All other systems reviewed and are negative.        Objective     Vitals:   Vitals:    04/01/24 1212   BP: 136/77   Pulse: 95   Temp: 97.9 °F (36.6 °C)   SpO2:  98%       Labs Reviewed  CBC:   Results from Last 12 Months   Lab Units 04/01/24  0400 03/21/24  0400 03/18/24  0710   WBC Thousand/uL 5.67  --  7.89   RBC Million/uL 2.61*  --  3.04*   HEMOGLOBIN g/dL 7.8*   < > 8.8*   HEMATOCRIT % 24.5*   < > 27.8*   MCV fL 94  --  91   MCH pg 29.9  --  28.9   MCHC g/dL 31.8  --  31.7   RDW % 15.8*  --  16.4*   MPV fL 10.1  --  10.2   PLATELETS Thousands/uL 337  --  229   NRBC AUTO /100 WBCs  --   --  0   NEUTROS PCT %  --   --  65   LYMPHS PCT %  --   --  19   MONOS PCT %  --   --  11   EOS PCT %  --   --  4   BASOS PCT %  --   --  0   NEUTROS ABS Thousands/µL  --   --  5.18   LYMPHS ABS Thousands/µL  --   --  1.46   MONOS ABS Thousand/µL  --   --  0.84   EOS ABS Thousand/µL  --   --  0.33    < > = values in this interval not displayed.         Physical Exam  Vitals reviewed.   Constitutional:       General: She is not in acute distress.     Appearance: Normal appearance. She is not ill-appearing, toxic-appearing or diaphoretic.   HENT:      Head: Normocephalic and atraumatic.   Eyes:      Conjunctiva/sclera: Conjunctivae normal.   Cardiovascular:      Rate and Rhythm: Normal rate.      Pulses: Normal pulses.      Heart sounds: Normal heart sounds. No murmur heard.  Pulmonary:      Effort: Pulmonary effort is normal. No respiratory distress.      Breath sounds: Normal breath sounds.   Abdominal:      General: Bowel sounds are normal. There is no distension.      Palpations: Abdomen is soft.      Tenderness: There is no abdominal tenderness.   Musculoskeletal:      Right lower leg: No edema.      Left lower leg: No edema.   Skin:     General: Skin is warm and dry.   Neurological:      General: No focal deficit present.      Mental Status: She is alert and oriented to person, place, and time.   Psychiatric:         Mood and Affect: Mood normal.         Behavior: Behavior normal.         Thought Content: Thought content normal.         Pertinent Laboratory/Diagnostic Studies:    "Reviewed in facility chart-stable      Current Medications   Medications reviewed and updated see facility MAR for details.      Current Outpatient Medications:     ferrous sulfate 325 (65 Fe) mg tablet, Take 1 tablet (325 mg total) by mouth daily with breakfast, Disp: 30 tablet, Rfl: 0    losartan (COZAAR) 50 mg tablet, Take 1 tablet (50 mg total) by mouth 2 (two) times a day, Disp: 60 tablet, Rfl: 0       Please note:  Voice-recognition software may have been used in the preparation of this document.  Occasional wrong word or \"sound-alike\" substitutions may have occurred due to the inherent limitations of voice recognition software.  Interpretation should be guided by context.         JOANN Canales    "

## 2024-04-01 NOTE — ASSESSMENT & PLAN NOTE
In the setting of metastatic cancer, recent hospitalization, surgery  Maintain fall and safety precautions

## 2024-04-01 NOTE — ASSESSMENT & PLAN NOTE
Appears euvolemic   Last Echo 10/2023 EF 75%  Not currently on diuretics or beta blocker  Monitor weights  F/u PCP

## 2024-04-01 NOTE — ASSESSMENT & PLAN NOTE
Likely multifactorial in the setting of CKD, iron deficiency and recent surgery  Received 1 unit [facility] inpatient 3/14/2024  Occult stools previously negative  She was started on iron supplement every other day at rehab, as of 3/28 changed to daily   Hemoglobin 7.4 today  Repeat occult stools   Repeat labs Monday  Transfuse as needed for hemoglobin less than 7

## 2024-04-01 NOTE — ASSESSMENT & PLAN NOTE
Stable   Avoid nephrotoxins, NSAID's  Avoid hypotension   Encourage adequate po hydration   Monitor routine labs   F/u with PCP

## 2024-04-01 NOTE — PROGRESS NOTES
Routine check to see if IV was resumed. As of 4/1, it wasn't. Will set to re-check.         Shira Holcomb MPH  Phone:[phone]  Email: [email or web address]

## 2024-04-01 NOTE — ASSESSMENT & PLAN NOTE
Likely multifactorial in the setting of CKD, iron deficiency and recent surgery  Received 1 unit [facility] inpatient 3/14/2024  Occult stools have been negative  She was started on iron supplement every other day at rehab, as of 3/28 changed to daily   Hemoglobin uptrending; 7.8 today   F/u with PCP

## 2024-04-01 NOTE — PROGRESS NOTES
[facility]  [address] [facility] [address]  [phone]  FACILITY: [facility]  Code 31 (STR)        NAME: Maira Moura  AGE: 69 y.o. SEX: female CODE STATUS: CPR    DATE OF ENCOUNTER: 3/29/2024    Assessment and Plan     1. Anemia due to stage 3a chronic kidney disease   Assessment & Plan:  Likely multifactorial in the setting of CKD, iron deficiency and recent surgery  Received 1 unit [facility] inpatient 3/14/2024  Occult stools previously negative  She was started on iron supplement every other day at rehab, as of 3/28 changed to daily   Hemoglobin 7.4 today  Repeat occult stools   Repeat labs Monday  Transfuse as needed for hemoglobin less than 7      2. Metastatic colon cancer to liver (HCC)  Assessment & Plan:  Diagnosed July 2023  S/p chemotherapy as of Jan 2024  Underwent elective robotic right hemicolectomy on 3/12/24 per surgical oncology  Pain controlled with Tylenol ATC, oxycodone 2.5 mg every 6 hours as needed, and gabapentin 300 mg 3 times daily  Follow up with Surgical Oncology outpatient-scheduled 4/2/24      3. Primary hypertension  Assessment & Plan:  Blood pressure acceptable  As of 3/18 Losartan was increased from 50 mg daily to 50 mg twice daily  Continue current dose  Continue hold parameters  Avoid hypotension  Monitor blood pressure      4. Physical deconditioning  Assessment & Plan:  In the setting of metastatic cancer, recent hospitalization, surgery  Continue PT/OT  Maintain fall and safety precautions   following for discharge planning      5. Gastroesophageal reflux disease, unspecified whether esophagitis present  Assessment & Plan:  Stable   Continue PPI           All medications and routine orders were reviewed and updated as needed.    Chief Complaint     STR follow up visit    Past Medical and Surgica History      Past Medical History:   Diagnosis Date    Anemia     Arthritis     Body mass index (BMI) 40.0-44.9, adult (HCC)     Breast  cancer (HCC) 12/17/2018    Cancer (HCC)     right breast, colon, liver, right tonsil    Cervical lymphadenopathy     CT neck on 3/30/2023- Large right level 2A lymphadenopathy as described above and suspicious for neoplasm.  Correlation with histopathology is recommended.  Mild asymmetric prominence of the right palatine tonsil with otherwise no definitive nodular enhancing lesions identified along the course of the aerodigestive tract.     5/26/23- FNA of this node was nonrevealing for tissue etiology, but it d    Encounter for screening for HIV 07/07/2022    Follow-up examination 04/04/2023    GERD (gastroesophageal reflux disease)     Hyperlipidemia     Hypertension     Obesity     Stroke (HCC)     TIA     Stroke (HCC)     TIA     Transaminitis 09/22/2021    Vasomotor rhinitis     Refilled flonase today. Stopped taking since January 2022     Past Surgical History:   Procedure Laterality Date    BREAST BIOPSY Right 11/23/2018    us guided bx cancer    BREAST SURGERY      COLONOSCOPY      ESOPHAGOSCOPY N/A 07/20/2023    Procedure: ESOPHAGOSCOPY;  Surgeon: David Chapa MD;  [facility];  Service: ENT    HEMICOLOECTOMY W/ ANASTOMOSIS Right 3/12/2024    Procedure: RIGHT COLECTOMY WITH ROBOTICS;  Surgeon: Vickie Israel MD;  Location: [facility];  Service: Surgical Oncology    IR BIOPSY LIVER MASS  06/27/2023    IR PORT CHECK  01/03/2024    IR PORT PLACEMENT  07/28/2023    IR PORT STRIPPING  01/09/2024    LAPAROTOMY N/A 3/12/2024    Procedure: LAPAROTOMY EXPLORATORY W/ ROBOTICS;  Surgeon: Vickie Israel MD;  [facility];  Service: Surgical Oncology    [facility] W/CELL WASHG SPX N/A 07/20/2023    Procedure: BRONCHOSCOPY;  Surgeon: David Chapa MD;  [facility];  Service: ENT    AL LARYNGOSCOPY W/BIOPSY MICROSCOPE/TELESCOPE N/A 07/20/2023    Procedure: MICRODIRECT LARYNGOSCOPY WITH BIOPSY;  Surgeon: David Chapa MD;  [facility];  Service: ENT     IN MASTECTOMY PARTIAL W/AXILLARY LYMPHADENECTOMY Right 12/20/2018    Procedure: ULTRASOUND LOCALIZED PARTIAL MASTECTOMY W/SENTINEL NODE BIOPSY POSS AXILLARY DISSECTION;  Surgeon: Zahida Coronado MD;  Location:  MAIN OR;  Service: General    TUBAL LIGATION      US GUIDED BREAST BIOPSY RIGHT COMPLETE Right 11/23/2018    US GUIDED INJECTION FOR RESEARCH STUDY  12/20/2018    US GUIDED INJECTION FOR RESEARCH STUDY  12/07/2018    US GUIDED INJECTION FOR RESEARCH STUDY  05/03/2019    US GUIDED LYMPH NODE BIOPSY RIGHT  05/26/2023     No Known Allergies       History of Present Illness     Patient is a 69 y.o. old female being seen at [facility] for follow up of acute and chronic medical conditions. Patient seen and examined while resting in chair without acute distress. She denies CP/SOB/N/V/D. Denies any s/s of bleeding.           The patient's allergies, past medical, surgical, social and family history were reviewed and unchanged.    Review of Systems     Review of Systems   Musculoskeletal:  Positive for gait problem.   Neurological:  Positive for weakness.   All other systems reviewed and are negative.        Objective     Vitals:   Vitals:    03/29/24 0947   BP: 130/60   Pulse: 88   Temp: 97.7 °F (36.5 °C)   SpO2: 93%       Labs Reviewed      Physical Exam  Vitals reviewed.   Constitutional:       General: She is not in acute distress.     Appearance: Normal appearance. She is not ill-appearing, toxic-appearing or diaphoretic.   HENT:      Head: Normocephalic and atraumatic.   Eyes:      Conjunctiva/sclera: Conjunctivae normal.   Cardiovascular:      Rate and Rhythm: Normal rate.      Pulses: Normal pulses.      Heart sounds: Normal heart sounds. No murmur heard.  Pulmonary:      Effort: Pulmonary effort is normal. No respiratory distress.      Breath sounds: Normal breath sounds.   Abdominal:      General: Bowel sounds are normal. There is no distension.      Palpations: Abdomen is soft.      Tenderness: There is no  "abdominal tenderness.   Musculoskeletal:      Right lower leg: No edema.      Left lower leg: No edema.   Skin:     General: Skin is warm and dry.   Neurological:      General: No focal deficit present.      Mental Status: She is alert and oriented to person, place, and time.      Motor: Weakness present.      Gait: Gait abnormal.   Psychiatric:         Mood and Affect: Mood normal.         Behavior: Behavior normal.         Thought Content: Thought content normal.         Pertinent Laboratory/Diagnostic Studies:   Reviewed in facility chart-stable      Current Medications   Medications reviewed and updated see facility MAR for details.      Current Outpatient Medications:     ferrous sulfate 325 (65 Fe) mg tablet, Take 1 tablet (325 mg total) by mouth daily with breakfast, Disp: , Rfl:     losartan (COZAAR) 50 mg tablet, Take 1 tablet (50 mg total) by mouth 2 (two) times a day, Disp: , Rfl:        Please note:  Voice-recognition software may have been used in the preparation of this document.  Occasional wrong word or \"sound-alike\" substitutions may have occurred due to the inherent limitations of voice recognition software.  Interpretation should be guided by context.         JOANN Canales    "

## 2024-04-02 ENCOUNTER — HOME CARE VISIT (OUTPATIENT)
Dept: HOME HEALTH SERVICES | Facility: HOME HEALTHCARE | Age: 69
End: 2024-04-02

## 2024-04-02 ENCOUNTER — PATIENT OUTREACH (OUTPATIENT)
Dept: HEMATOLOGY ONCOLOGY | Facility: CLINIC | Age: 69
End: 2024-04-02

## 2024-04-02 ENCOUNTER — TELEPHONE (OUTPATIENT)
Dept: HEMATOLOGY ONCOLOGY | Facility: CLINIC | Age: 69
End: 2024-04-02

## 2024-04-02 NOTE — TELEPHONE ENCOUNTER
Called PT for NO SHOW for Post Op with Dr. Israel, daughter answered the phone.  Appt has been rescheduled for tomorrow with Dr. Israel at 11AM.

## 2024-04-03 ENCOUNTER — PATIENT OUTREACH (OUTPATIENT)
Dept: HEMATOLOGY ONCOLOGY | Facility: CLINIC | Age: 69
End: 2024-04-03

## 2024-04-03 ENCOUNTER — HOME CARE VISIT (OUTPATIENT)
Dept: HOME HEALTH SERVICES | Facility: HOME HEALTHCARE | Age: 69
End: 2024-04-03

## 2024-04-03 ENCOUNTER — OFFICE VISIT (OUTPATIENT)
Dept: SURGICAL ONCOLOGY | Facility: CLINIC | Age: 69
End: 2024-04-03

## 2024-04-03 VITALS
HEART RATE: 113 BPM | TEMPERATURE: 98 F | HEIGHT: 65 IN | DIASTOLIC BLOOD PRESSURE: 70 MMHG | WEIGHT: 200.2 LBS | OXYGEN SATURATION: 96 % | BODY MASS INDEX: 33.36 KG/M2 | SYSTOLIC BLOOD PRESSURE: 130 MMHG

## 2024-04-03 DIAGNOSIS — C18.9 METASTATIC COLON CANCER TO LIVER (HCC): Primary | ICD-10-CM

## 2024-04-03 DIAGNOSIS — C78.7 METASTATIC COLON CANCER TO LIVER (HCC): Primary | ICD-10-CM

## 2024-04-03 PROCEDURE — 99024 POSTOP FOLLOW-UP VISIT: CPT | Performed by: STUDENT IN AN ORGANIZED HEALTH CARE EDUCATION/TRAINING PROGRAM

## 2024-04-03 RX ORDER — DIAPER,BRIEF,INFANT-TODD,DISP
EACH MISCELLANEOUS
COMMUNITY
Start: 2024-02-21

## 2024-04-03 NOTE — LETTER
April 3, 2024     Harika Medina DO  [address]   [address]  [facility] [address]    Patient: Maira Moura   YOB: 1955   Date of Visit: 4/3/2024       Dear Dr. Medina:    Thank you for referring Maira Moura to me for evaluation. Below are my notes for this consultation.    If you have questions, please do not hesitate to call me. I look forward to following your patient along with you.         Sincerely,        Vickie Israel MD        CC: No Recipients    Vickie Israel MD  4/3/2024  2:56 PM  Incomplete  Surgical Oncology Consultation F/U    [facility] [place]  [address] [facility] [address]  [phone]    Patient:  Maira Moura  1955  [id number]    Primary Care provider:  Fanta Turner MD  [address]  [facility] [address]    Referring provider:  No referring provider defined for this encounter.    Diagnoses and all orders for this visit:    Metastatic colon cancer to liver (HCC)  -     MRI abdomen w wo contrast; Future    Other orders  -     hydrocortisone 1 % ointment        Chief Complaint   Patient presents with   • Post-op       Return in about 3 months (around 7/3/2024).    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, unresectable    7/31/2023 - FOLFOX    11/20/2023 - opdivo added to FOLFOX    12/18/2023-cycle 11-20% dose reduction of 5-FU bolus and oxaliplatin due to increased fatigue    1/2023 - oxaliplatin removed from treatment plan due to neuropathy - PET scan shows good disease control in all areas except colon lesion     Malignant neoplasm of right female breast (HCC)   11/23/2018 Initial Diagnosis    Malignant neoplasm of right  "female breast (HCC)     11/23/2018 Biopsy    Rt Breast US BX 1100 8cmfn, 4 passes 12g Mardenis:  - Invasive breast carcinoma of no special type (ductal NST/invasive ductal carcinoma).  - grade 2  - %  - MD 95%  - HER2 negative     12/20/2018 Surgery    Right partial mastectomy and SLN biopsy:  Infiltrating ductal carcinoma, Grade 2/3, felt to be between 2.0 cm and 2.5 cm in greatest dimension  - No invasive tumor is seen at inked margins, but there is a focus of DCIS seen within approximately 1mm of the inked medial margin  - no LVI  - no LCIS  - 0/2 LN's  at least Stage IIA - pT2, pN0, cM0, G2.    - Dr Coronado     12/20/2018 -  Cancer Staged    Stage IIA - pT2, pN0, cM0, G2.       1/4/2019 Genomic Testing    Oncotype DX score: 13     2/1/2019 -  Hormone Therapy    anastrozole 1 mg daily as adjuvant endocrine therapy    - Dr Daley       2/14/2019 - 4/3/2019 Radiation    Course: C1    Plan ID Energy Fractions Dose per Fraction (cGy) Dose Correction (cGy) Total Dose Delivered (cGy) Elapsed Days   R Breast 10X/6X 25 / 25 200 0 5,000 40   R BRST BOOST 16E 5 / 5 200 0 1,000 7      Treatment dates:  C1: 2/14/2019 - 4/3/2019       Metastatic colon cancer to liver (HCC)   7/6/2023 Genetic Testing    CARIS Results:   KRAS Pathogenic Variant Exon 2  p.G12D : lack of benefit of cetuximab, panitumumab Level 2   No other actionable mutation; MSI stable; TMB low   MiFOLOXAi Results: \"Decreased Benefit of FOLFOX + Bevacizumab in first line metastatic CRC.  This patient may achieve improved results by receiving an alternative to FOLFOX, such as FOLFIRI, as their initial regimen. As an adjustment to frontline FOLFOXIRI following toxicity: This patient may achieve improved results by removing the oxaliplatin portion of their regimen\".         7/11/2023 Initial Diagnosis    Metastatic colon cancer to liver (HCC)     7/13/2023 -  Cancer Staged    Staging form: Colon and Rectum, AJCC 8th Edition  - Clinical stage from 7/13/2023: " cT3, cN0, pM1 - Signed by Harika Medina DO on 9/28/2023  Total positive nodes: 0       7/31/2023 -  Chemotherapy    potassium chloride, 20 mEq, Intravenous, Once, 2 of 2 cycles  Administration: 20 mEq (11/6/2023), 20 mEq (11/20/2023)  alteplase (CATHFLO), 2 mg, Intracatheter, Every 1 Minute as needed, 13 of 15 cycles  Administration: 2 mg (1/3/2024)  pegfilgrastim (NEULASTA), 6 mg, Subcutaneous, Once, 7 of 9 cycles  Administration: 6 mg (10/25/2023), 6 mg (11/8/2023), 6 mg (11/22/2023), 6 mg (12/6/2023), 6 mg (12/20/2023), 6 mg (1/12/2024), 6 mg (1/25/2024)  fluorouracil (ADRUCIL), 895 mg, Intravenous, Once, 13 of 15 cycles  Dose modification: 320 mg/m2 (original dose 400 mg/m2, Cycle 11)  Administration: 900 mg (7/31/2023), 900 mg (8/14/2023), 900 mg (8/28/2023), 900 mg (9/11/2023), 900 mg (9/25/2023), 900 mg (10/9/2023), 900 mg (10/23/2023), 895 mg (11/6/2023), 895 mg (11/20/2023), 895 mg (12/4/2023), 700 mg (12/18/2023), 680 mg (1/10/2024), 680 mg (1/23/2024)  nivolumab (OPDIVO) IVPB, 240 mg (100 % of original dose 240 mg), Intravenous, Once, 5 of 7 cycles  Dose modification: 240 mg (original dose 240 mg, Cycle 9)  Administration: 240 mg (11/20/2023), 240 mg (12/4/2023), 240 mg (12/18/2023), 240 mg (1/10/2024), 240 mg (1/23/2024)  leucovorin calcium IVPB, 896 mg, Intravenous, Once, 13 of 15 cycles  Administration: 900 mg (7/31/2023), 900 mg (8/14/2023), 900 mg (8/28/2023), 900 mg (9/11/2023), 900 mg (9/25/2023), 900 mg (10/9/2023), 900 mg (10/23/2023), 900 mg (11/6/2023), 900 mg (11/20/2023), 900 mg (12/4/2023), 900 mg (12/18/2023), 850 mg (1/10/2024), 850 mg (1/23/2024)  oxaliplatin (ELOXATIN) chemo infusion, 85 mg/m2 = 190.4 mg, Intravenous, Once, 12 of 12 cycles  Dose modification: 68 mg/m2 (original dose 85 mg/m2, Cycle 11, Reason: Other (Must fill in a comment), Comment: increased fatigue)  Administration: 190.4 mg (7/31/2023), 190.4 mg (8/14/2023), 200 mg (8/28/2023), 200 mg (9/11/2023), 200 mg  (9/25/2023), 200 mg (10/9/2023), 200 mg (10/23/2023), 200 mg (11/6/2023), 200 mg (11/20/2023), 190.4 mg (12/4/2023), 150 mg (12/18/2023), 150 mg (1/10/2024)  fluorouracil (ADRUCIL) ambulatory infusion Soln, 1,200 mg/m2/day = 5,375 mg, Intravenous, Over 46 hours, 13 of 15 cycles     3/12/2024 Surgery    A. Terminal ileum, appendix, right colon (right hemicolectomy):  - Adenocarcinoma (5.2cm)  - Forty-nine (49) lymph nodes negative for carcinoma (0/49)    - Acellular mucin present in one (1) lymph node   - See staging synoptic (ypT3N0)     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HCC)   7/27/2023 Initial Diagnosis    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HCC)     7/27/2023 -  Cancer Staged    Staging form: Pharynx - Oropharynx, AJCC 8th Edition  - Clinical stage from 7/27/2023: Stage III (cT1, cN3, cM0, p16+) - Signed by Evan Plummer MD on 7/27/2023  Stage prefix: Initial diagnosis           History of Present Illness  :   This is a 68-year-old female seen today with a new right neck nodule.  Briefly, the patient presented to her primary care physician due to what she describes as throat soreness.  A large right neck nodule was identified, prompting a CT scan of the neck.  This demonstrated a near 5 cm right level 2 lymph node with multicystic components.  It also detected a right heterogeneous thyroid nodule of about 2 cm which did not appear to be locally invasive. The patient denies any significant symptoms of throat pain, trouble breathing, trouble swallowing, pain with swallowing, voice changes.  She does describe a sore throat for months if not years.  She states that the right sided neck nodule became enlarged over the last couple of months but it has not really bothered her.  She does have a history of right breast cancer treated with breast conservation in 2018.  Her mammograms since that time have been benign.  She denies any other lumps or bumps of the left neck, axilla, inguinal regions.  She  denies any fevers, chills, night sweats, weight loss, other systemic symptoms.    At this juncture, given that she does not have any symptoms concerning for lymphoma and her enlarged lymph nodes are limited to the right neck where she also has a right thyroid nodule, I am concerned about a lymph node involved thyroid cancer.  I would like her to undergo an FNA of the right neck node to determine a baseline histology.  Likewise, she will need to undergo the thyroid ultrasound she is scheduled for as well as a potential right thyroid biopsy depending on these results.    Interval 6/7/23  Patient presents today for interval follow-up after the above investigations.  Thyroid ultrasound revealed a spongiform nodule with questionable criteria for biopsy.  Percutaneous FNA of the enlarged right cervical node revealed carcinoma, however, they were not able to determine tissue of etiology given the scant tissue.  The patient's symptoms are largely unchanged.    8/2023  Two cycles FOLFOX completed for metastatic colon ca to liver. Also diagnosed with SCC at tonsil with mets to nodes. Plan for eventual XRT it appears. Doing well - swallowing well. Poor appetite but working on it. No abd pain, n/v, trouble with BM    1/2024  Pt seen in f/u. She is status post 12 total cycles of FOLFOX with nivolumab added in November.  PET scan prior to this visit shows good control of all areas of disease with the exception of increase in the primary colon mass. Her SCC is now un-detectable by PET - she has not received XRT for this. 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 Last 5FU nivo one week ago. Does suffer with constipation. No blood per rectum.     Today we discussed robo right colectomy and risks to include infection, bleeding, anastomotic leak.  The patient has not had surgery within her abdomen before and thus her risks for completion robotically are low, however, she is obese and this does  increase her risks.  I like to discuss with Dr. Medina what it would look like to come off therapy.  Likewise, she will be discussed at our tumor board.  Finally, I like to discuss management of her squamous cell cancer and whether the Nivo has been effective for this and if holding it will be risky for recurrence.  Will plan on robotic right colectomy in roughly 5 weeks which would be 6 weeks from her most recent treatment.  She will also need cardiac clearance given her history.      3/2024  Discussed at  as well as with Dr Medina and will plan on right colon. Cardiac eval obtained and pt at acceptable level of risk without need for further testing.     4/2024  S/p robo right colon. Had hard recovery 2/2 preop deconditioning but is now home and doing great. One BM every 3 days and taking stool softeners. Eating well and getting stronger.     Review of Systems  Complete ROS Surg Onc:   Constitutional: The patient denies new or recent history of general fatigue, no recent weight loss, no change in appetite.   Eyes: No complaints of visual problems, no scleral icterus.   ENT: No complaints of ear pain, no hoarseness, no difficulty swallowing,  no tinnitus and no new masses in head, oral cavity, or neck.   Cardiovascular: No complaints of chest pain, no palpitations, no ankle edema.   Respiratory: No complaints of shortness of breath, no cough.   Gastrointestinal: No complaints of jaundice, no bloody stools, no pale stools.   Genitourinary: No complaints of dysuria, no hematuria, no nocturia, no frequent urination, no urethral discharge.   Musculoskeletal: No complaints of weakness, paralysis, joint stiffness or arthralgias.  Integumentary: No complaints of rash, no new lesions.   Neurological: No complaints of convulsions, no seizures, no dizziness.   Hematologic/Lymphatic: No complaints of easy bruising.   Endocrine:  No hot or cold intolerance.  No polydipsia, polyphagia, or polyuria.  Allergy/immunology:  No  environmental allergies.  No food allergies.  Not immunocompromised.      Patient Active Problem List   Diagnosis   • Primary hypertension   • Mixed hyperlipidemia   • Malignant neoplasm of right female breast (HCC)   • Vitamin D deficiency   • Prediabetes   • Right thyroid nodule   • GERD (gastroesophageal reflux disease)   • Obesity   • Metastatic colon cancer to liver (HCC)   • 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HCC)   • Poor appetite   • Anemia   • Dehydration   • Drug-induced constipation   • Chemotherapy induced neutropenia (HCC)   • Stage 3a chronic kidney disease (HCC)   • Thrombocytopenia (HCC)   • Neuropathy   • Diastolic dysfunction   • Hypokalemia   • Leukemoid reaction   • GOGO (acute kidney injury) (HCC)   • Acute post-operative pain   • At risk for constipation   • At risk for delirium   • Advance care planning   • Physical deconditioning   • History of CVA (cerebrovascular accident)   • Chronic nasal congestion   • Elevated LFTs     Past Medical History:   Diagnosis Date   • Anemia    • Arthritis    • Body mass index (BMI) 40.0-44.9, adult (HCC)    • Breast cancer (HCC) 12/17/2018   • Cancer (HCC)     right breast, colon, liver, right tonsil   • Cervical lymphadenopathy     CT neck on 3/30/2023- Large right level 2A lymphadenopathy as described above and suspicious for neoplasm.  Correlation with histopathology is recommended.  Mild asymmetric prominence of the right palatine tonsil with otherwise no definitive nodular enhancing lesions identified along the course of the aerodigestive tract.     5/26/23- FNA of this node was nonrevealing for tissue etiology, but it d   • Encounter for screening for HIV 07/07/2022   • Follow-up examination 04/04/2023   • GERD (gastroesophageal reflux disease)    • Hyperlipidemia    • Hypertension    • Obesity    • Stroke (HCC)     TIA    • Stroke (HCC)     TIA    • Transaminitis 09/22/2021   • Vasomotor rhinitis     Refilled flonase today. Stopped taking since  January 2022     Past Surgical History:   Procedure Laterality Date   • BREAST BIOPSY Right 11/23/2018    us guided bx cancer   • BREAST SURGERY     • COLONOSCOPY     • ESOPHAGOSCOPY N/A 07/20/2023    Procedure: ESOPHAGOSCOPY;  Surgeon: David Chapa MD;  [facility];  Service: ENT   • HEMICOLOECTOMY W/ ANASTOMOSIS Right 3/12/2024    Procedure: RIGHT COLECTOMY WITH ROBOTICS;  Surgeon: Vickie Israel MD;  [facility];  Service: Surgical Oncology   • IR BIOPSY LIVER MASS  06/27/2023   • IR PORT CHECK  01/03/2024   • IR PORT PLACEMENT  07/28/2023   • IR PORT STRIPPING  01/09/2024   • LAPAROTOMY N/A 3/12/2024    Procedure: LAPAROTOMY EXPLORATORY W/ ROBOTICS;  Surgeon: Vickie Israel MD;  [facility];  Service: Surgical Oncology   • [facility] W/CELL WASHG SPX N/A 07/20/2023    Procedure: BRONCHOSCOPY;  Surgeon: David Chapa MD;  [facility];  Service: ENT   • RI LARYNGOSCOPY W/BIOPSY MICROSCOPE/TELESCOPE N/A 07/20/2023    Procedure: MICRODIRECT LARYNGOSCOPY WITH BIOPSY;  Surgeon: David Chapa MD;  [facility];  Service: ENT   • RI MASTECTOMY PARTIAL W/AXILLARY LYMPHADENECTOMY Right 12/20/2018    Procedure: ULTRASOUND LOCALIZED PARTIAL MASTECTOMY W/SENTINEL NODE BIOPSY POSS AXILLARY DISSECTION;  Surgeon: Zahida Coronado MD;  Location: [facility];  Service: General   • TUBAL LIGATION     • US GUIDED BREAST BIOPSY RIGHT COMPLETE Right 11/23/2018   • US GUIDED INJECTION FOR RESEARCH STUDY  12/20/2018   • US GUIDED INJECTION FOR RESEARCH STUDY  12/07/2018   • US GUIDED INJECTION FOR RESEARCH STUDY  05/03/2019   • US GUIDED LYMPH NODE BIOPSY RIGHT  05/26/2023     Family History   Problem Relation Age of Onset   • Colon cancer Mother 58   • Hypertension Mother    • Pancreatic cancer Father 60   • Hypertension Daughter    • Hypertension Son      Social History     Socioeconomic History   • Marital status: Single     Spouse name: Not on file    • Number of children: Not on file   • Years of education: Not on file   • Highest education level: Not on file   Occupational History   • Not on file   Tobacco Use   • Smoking status: Never     Passive exposure: Never   • Smokeless tobacco: Never   • Tobacco comments:     NO TOBACCO USE   Vaping Use   • Vaping status: Never Used   Substance and Sexual Activity   • Alcohol use: Never   • Drug use: Never   • Sexual activity: Not on file   Other Topics Concern   • Not on file   Social History Narrative   • Not on file     Social Determinants of Health     Financial Resource Strain: Low Risk  (10/9/2023)    Overall Financial Resource Strain (CARDIA)    • Difficulty of Paying Living Expenses: Not hard at all   Food Insecurity: No Food Insecurity (3/13/2024)    Hunger Vital Sign    • Worried About Running Out of Food in the Last Year: Never true    • Ran Out of Food in the Last Year: Never true   Transportation Needs: No Transportation Needs (3/13/2024)    PRAPARE - Transportation    • Lack of Transportation (Medical): No    • Lack of Transportation (Non-Medical): No   Physical Activity: Not on file   Stress: Not on file   Social Connections: Not on file   Intimate Partner Violence: Not on file   Housing Stability: Low Risk  (3/13/2024)    Housing Stability Vital Sign    • Unable to Pay for Housing in the Last Year: No    • Number of Places Lived in the Last Year: 1    • Unstable Housing in the Last Year: No       Current Outpatient Medications:   •  anastrozole (ARIMIDEX) 1 mg tablet, Take 1 tablet (1 mg total) by mouth daily, Disp: 30 tablet, Rfl: 0  •  atorvastatin (LIPITOR) 40 mg tablet, Take 1 tablet (40 mg total) by mouth daily at bedtime, Disp: 30 tablet, Rfl: 2  •  docusate sodium (COLACE) 50 mg capsule, Take 1 capsule (50 mg total) by mouth 2 (two) times a day, Disp: 90 capsule, Rfl: 1  •  ergocalciferol (VITAMIN D2) 50,000 units, Take 1 capsule (50,000 Units total) by mouth once a week, Disp: 90 capsule, Rfl:  3  •  ferrous sulfate 325 (65 Fe) mg tablet, Take 1 tablet (325 mg total) by mouth daily with breakfast, Disp: 30 tablet, Rfl: 0  •  folic acid (FOLVITE) 1 mg tablet, Take 1 tablet (1 mg total) by mouth in the morning, Disp: 90 tablet, Rfl: 3  •  gabapentin (NEURONTIN) 300 mg capsule, Take 1 capsule (300 mg total) by mouth 3 (three) times a day for 7 days, Disp: 21 capsule, Rfl: 0  •  hydrocortisone 1 % ointment, , Disp: , Rfl:   •  lidocaine-prilocaine (EMLA) cream, Apply topically as needed for mild pain, Disp: 30 g, Rfl: 3  •  losartan (COZAAR) 50 mg tablet, Take 1 tablet (50 mg total) by mouth 2 (two) times a day, Disp: 60 tablet, Rfl: 0  •  mirtazapine (REMERON) 30 mg tablet, Take 1 tablet (30 mg total) by mouth daily at bedtime, Disp: 30 tablet, Rfl: 3  •  omeprazole (PriLOSEC) 20 mg delayed release capsule, Take 1 capsule (20 mg total) by mouth daily, Disp: 30 capsule, Rfl: 5  •  ondansetron (ZOFRAN) 8 mg tablet, Take 1 tablet (8 mg total) by mouth every 8 (eight) hours as needed for nausea or vomiting, Disp: 30 tablet, Rfl: 3  •  potassium chloride (K-DUR,KLOR-CON) 20 mEq tablet, Take 1 tablet (20 mEq total) by mouth 2 (two) times a day, Disp: 60 tablet, Rfl: 1  •  prochlorperazine (COMPAZINE) 10 mg tablet, Take 1 tablet (10 mg total) by mouth every 6 (six) hours as needed for nausea or vomiting, Disp: 30 tablet, Rfl: 3  •  pyridoxine (VITAMIN B6) 50 mg tablet, Take 1 tablet (50 mg total) by mouth daily, Disp: 90 tablet, Rfl: 0  •  Cyanocobalamin (B-12 PO), Take by mouth (Patient not taking: Reported on 3/7/2024), Disp: , Rfl:   •  polyethylene glycol (MIRALAX) 17 g packet, Take 17 g by mouth daily for 7 days (Patient not taking: Reported on 4/3/2024), Disp: 119 g, Rfl: 0  No Known Allergies    Vitals:    04/03/24 1059   BP: 130/70   Pulse: (!) 113   Temp: 98 °F (36.7 °C)   SpO2: 96%       Physical Exam   General: Appears well, appears stated age  Skin: Warm, anicteric  HEENT: Normocephalic, atraumatic;  sclera aniceteric, mucous membranes moist; cervical nodes without adenopathy. RIGHT neck firm nodule at inf margin of mandible appreciated  Cardiopulmonary: RRR, Easy WOB, no BLE edema  Abd: Flat and soft, nontender, no masses appreciated, no hepatosplenomegaly  MSK: Symmetric, no cyanosis, no overt weakness  Lymphatic: No cervical, axillary or inguinal lymphadenopathy  Neuro: Affect appropriate, no gross motor abnormalities      Pathology:  Pending    Labs: Reviewed in EPIC    Imaging  CT soft tissue neck w contrast    Result Date: 4/4/2023  Narrative: CT NECK WITH CONTRAST INDICATION:   I88.9: Nonspecific lymphadenitis, unspecified.  Right-sided neck swelling for one month. COMPARISON:  None. TECHNIQUE:  Axial, sagittal, and coronal 2D reformatted images were created from the axial source data and submitted for interpretation. Radiation dose length product (DLP) for this visit:  352 mGy-cm .  This examination, like all CT scans performed in the [facility], was performed utilizing techniques to minimize radiation dose exposure, including the use of iterative reconstruction and automated exposure control. IV Contrast:  85 mL of iohexol (OMNIPAQUE) IMAGE QUALITY:  Diagnostic. FINDINGS: VISUALIZED BRAIN PARENCHYMA:  No acute intracranial pathology of the visualized brain parenchyma. VISUALIZED ORBITS: Normal visualized orbits. PARANASAL SINUSES: There is chronic opacification of the right sphenoid sinus with surrounding chronic osteitis.  The medial bony wall along the petrous portion of the right internal carotid artery also appears dehiscent with the adjacent sphenoid sinus.   NASAL CAVITY AND NASOPHARYNX:  Normal. SUPRAHYOID NECK:  There is mild asymmetric prominence of the right palatine tonsil with otherwise no discrete underlying nodular enhancing lesion.  Lingual tonsils appear grossly unremarkable. INFRAHYOID NECK:  Aryepiglottic folds and piriform sinuses are normal.  Normal glottis and  subglottic airway. THYROID GLAND:  There is a right thyroid lobe nodule measuring 1.5 x 1.7 cm on series 3, image 47. PAROTID AND SUBMANDIBULAR GLANDS:  Normal. LYMPH NODES:  There is a large right level 2A lymph node with internal cystic change measuring 2.2 x 3.7 x 4.7 cm.  This results in mass effect and anterior displacement of the right submandibular gland. VASCULAR STRUCTURES:  Normal enhancement of the cervical vasculature. THORACIC INLET:  Lung apices and upper mediastinum are unremarkable. BONY STRUCTURES: No acute fracture or destructive osseous lesion.     Impression: Large right level 2A lymphadenopathy as described above and suspicious for neoplasm.  Correlation with histopathology is recommended. Mild asymmetric prominence of the right palatine tonsil with otherwise no definitive nodular enhancing lesions identified along the course of the aerodigestive tract. Heterogeneous right thyroid lobe nodule.  Ultrasound is recommended for further evaluation.  I personally discussed this study with ERICA ANNE on 4/4/2023 at 9:21 AM. Workstation performed: [id number]       I independently reviewed and interpreted the above laboratory and imaging data, including CT of the neck, breast history, pathology, thyroid US. ENT notes, op notes, path, med onc notes, recent PET.       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 On systemic treatment and has completed 12 cycles folfox. Was on nivo and maintenance 5FU and f/u PET 1/2024 with activity only in colon mass which was more avid than before; was also having abd discomfort and sx c/w partial obstruction. Now s/p RT robo hemicolectomy and doing very well. Path with 0/49 nodes positive, T3 tumor with basically zero response to chemo. Given low burden of disease, I will obtain an MR abdomen to see if liver direct therapies can be offered for remaining tumors. She is seeing   Adam 4/12 to discuss ongoing systemic management as well. I will call her with MR results after discussion with Dr Medina and will otherwise see her in 3 mo.             Vickie Israel MD  4/3/2024 11:49 AM  Incomplete  Surgical Oncology Consultation F/U    [facility] [place]  [address] [facility] [address] [phone]    Patient:  Maira Moura  1955  [id number]    Primary Care provider:  Fanta Turner MD  [address]  [facility] [address]    Referring provider:  No referring provider defined for this encounter.    Diagnoses and all orders for this visit:    Metastatic colon cancer to liver (HCC)  -     MRI abdomen w wo contrast; Future    Other orders  -     hydrocortisone 1 % ointment        Chief Complaint   Patient presents with   • Post-op       Return in about 3 months (around 7/3/2024).    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, unresectable    7/31/2023 - FOLFOX    11/20/2023 - opdivo added to FOLFOX    12/18/2023-cycle 11-20% dose reduction of 5-FU bolus and oxaliplatin due to increased fatigue    1/2023 - oxaliplatin removed from treatment plan due to neuropathy - PET scan shows good disease control in all areas except colon lesion     Malignant neoplasm of right female breast (HCC)   11/23/2018 Initial Diagnosis    Malignant neoplasm of right female breast (HCC)     11/23/2018 Biopsy    Rt Breast US BX 1100 8cmfn, 4 passes 12g Marquee:  - Invasive breast carcinoma of no special type (ductal NST/invasive ductal carcinoma).  - grade 2  - %  - AL 95%  - HER2 negative     12/20/2018 Surgery    Right partial mastectomy and SLN biopsy:  Infiltrating ductal  "carcinoma, Grade 2/3, felt to be between 2.0 cm and 2.5 cm in greatest dimension  - No invasive tumor is seen at inked margins, but there is a focus of DCIS seen within approximately 1mm of the inked medial margin  - no LVI  - no LCIS  - 0/2 LN's  at least Stage IIA - pT2, pN0, cM0, G2.    - Dr Coronado     12/20/2018 -  Cancer Staged    Stage IIA - pT2, pN0, cM0, G2.       1/4/2019 Genomic Testing    Oncotype DX score: 13     2/1/2019 -  Hormone Therapy    anastrozole 1 mg daily as adjuvant endocrine therapy    - Dr Daley       2/14/2019 - 4/3/2019 Radiation    Course: C1    Plan ID Energy Fractions Dose per Fraction (cGy) Dose Correction (cGy) Total Dose Delivered (cGy) Elapsed Days   R Breast 10X/6X 25 / 25 200 0 5,000 40   R BRST BOOST 16E 5 / 5 200 0 1,000 7      Treatment dates:  C1: 2/14/2019 - 4/3/2019       Metastatic colon cancer to liver (HCC)   7/6/2023 Genetic Testing    CARIS Results:   KRAS Pathogenic Variant Exon 2  p.G12D : lack of benefit of cetuximab, panitumumab Level 2   No other actionable mutation; MSI stable; TMB low   MiFOLOXAi Results: \"Decreased Benefit of FOLFOX + Bevacizumab in first line metastatic CRC.  This patient may achieve improved results by receiving an alternative to FOLFOX, such as FOLFIRI, as their initial regimen. As an adjustment to frontline FOLFOXIRI following toxicity: This patient may achieve improved results by removing the oxaliplatin portion of their regimen\".         7/11/2023 Initial Diagnosis    Metastatic colon cancer to liver (HCC)     7/13/2023 -  Cancer Staged    Staging form: Colon and Rectum, AJCC 8th Edition  - Clinical stage from 7/13/2023: cT3, cN0, pM1 - Signed by Harika Medina DO on 9/28/2023  Total positive nodes: 0       7/31/2023 -  Chemotherapy    potassium chloride, 20 mEq, Intravenous, Once, 2 of 2 cycles  Administration: 20 mEq (11/6/2023), 20 mEq (11/20/2023)  alteplase (CATHFLO), 2 mg, Intracatheter, Every 1 Minute as needed, 13 of 15 " cycles  Administration: 2 mg (1/3/2024)  pegfilgrastim (NEULASTA), 6 mg, Subcutaneous, Once, 7 of 9 cycles  Administration: 6 mg (10/25/2023), 6 mg (11/8/2023), 6 mg (11/22/2023), 6 mg (12/6/2023), 6 mg (12/20/2023), 6 mg (1/12/2024), 6 mg (1/25/2024)  fluorouracil (ADRUCIL), 895 mg, Intravenous, Once, 13 of 15 cycles  Dose modification: 320 mg/m2 (original dose 400 mg/m2, Cycle 11)  Administration: 900 mg (7/31/2023), 900 mg (8/14/2023), 900 mg (8/28/2023), 900 mg (9/11/2023), 900 mg (9/25/2023), 900 mg (10/9/2023), 900 mg (10/23/2023), 895 mg (11/6/2023), 895 mg (11/20/2023), 895 mg (12/4/2023), 700 mg (12/18/2023), 680 mg (1/10/2024), 680 mg (1/23/2024)  nivolumab (OPDIVO) IVPB, 240 mg (100 % of original dose 240 mg), Intravenous, Once, 5 of 7 cycles  Dose modification: 240 mg (original dose 240 mg, Cycle 9)  Administration: 240 mg (11/20/2023), 240 mg (12/4/2023), 240 mg (12/18/2023), 240 mg (1/10/2024), 240 mg (1/23/2024)  leucovorin calcium IVPB, 896 mg, Intravenous, Once, 13 of 15 cycles  Administration: 900 mg (7/31/2023), 900 mg (8/14/2023), 900 mg (8/28/2023), 900 mg (9/11/2023), 900 mg (9/25/2023), 900 mg (10/9/2023), 900 mg (10/23/2023), 900 mg (11/6/2023), 900 mg (11/20/2023), 900 mg (12/4/2023), 900 mg (12/18/2023), 850 mg (1/10/2024), 850 mg (1/23/2024)  oxaliplatin (ELOXATIN) chemo infusion, 85 mg/m2 = 190.4 mg, Intravenous, Once, 12 of 12 cycles  Dose modification: 68 mg/m2 (original dose 85 mg/m2, Cycle 11, Reason: Other (Must fill in a comment), Comment: increased fatigue)  Administration: 190.4 mg (7/31/2023), 190.4 mg (8/14/2023), 200 mg (8/28/2023), 200 mg (9/11/2023), 200 mg (9/25/2023), 200 mg (10/9/2023), 200 mg (10/23/2023), 200 mg (11/6/2023), 200 mg (11/20/2023), 190.4 mg (12/4/2023), 150 mg (12/18/2023), 150 mg (1/10/2024)  fluorouracil (ADRUCIL) ambulatory infusion Soln, 1,200 mg/m2/day = 5,375 mg, Intravenous, Over 46 hours, 13 of 15 cycles     3/12/2024 Surgery    A. Terminal ileum,  appendix, right colon (right hemicolectomy):  - Adenocarcinoma (5.2cm)  - Forty-nine (49) lymph nodes negative for carcinoma (0/49)    - Acellular mucin present in one (1) lymph node   - See staging synoptic (ypT3N0)     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HCC)   7/27/2023 Initial Diagnosis    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HCC)     7/27/2023 -  Cancer Staged    Staging form: Pharynx - Oropharynx, AJCC 8th Edition  - Clinical stage from 7/27/2023: Stage III (cT1, cN3, cM0, p16+) - Signed by Evan Plummer MD on 7/27/2023  Stage prefix: Initial diagnosis           History of Present Illness  :   This is a 68-year-old female seen today with a new right neck nodule.  Briefly, the patient presented to her primary care physician due to what she describes as throat soreness.  A large right neck nodule was identified, prompting a CT scan of the neck.  This demonstrated a near 5 cm right level 2 lymph node with multicystic components.  It also detected a right heterogeneous thyroid nodule of about 2 cm which did not appear to be locally invasive. The patient denies any significant symptoms of throat pain, trouble breathing, trouble swallowing, pain with swallowing, voice changes.  She does describe a sore throat for months if not years.  She states that the right sided neck nodule became enlarged over the last couple of months but it has not really bothered her.  She does have a history of right breast cancer treated with breast conservation in 2018.  Her mammograms since that time have been benign.  She denies any other lumps or bumps of the left neck, axilla, inguinal regions.  She denies any fevers, chills, night sweats, weight loss, other systemic symptoms.    At this juncture, given that she does not have any symptoms concerning for lymphoma and her enlarged lymph nodes are limited to the right neck where she also has a right thyroid nodule, I am concerned about a lymph node involved thyroid  cancer.  I would like her to undergo an FNA of the right neck node to determine a baseline histology.  Likewise, she will need to undergo the thyroid ultrasound she is scheduled for as well as a potential right thyroid biopsy depending on these results.    Interval 6/7/23  Patient presents today for interval follow-up after the above investigations.  Thyroid ultrasound revealed a spongiform nodule with questionable criteria for biopsy.  Percutaneous FNA of the enlarged right cervical node revealed carcinoma, however, they were not able to determine tissue of etiology given the scant tissue.  The patient's symptoms are largely unchanged.    8/2023  Two cycles FOLFOX completed for metastatic colon ca to liver. Also diagnosed with SCC at tonsil with mets to nodes. Plan for eventual XRT it appears. Doing well - swallowing well. Poor appetite but working on it. No abd pain, n/v, trouble with BM    1/2024  Pt seen in f/u. She is status post 12 total cycles of FOLFOX with nivolumab added in November.  PET scan prior to this visit shows good control of all areas of disease with the exception of increase in the primary colon mass. Her SCC is now un-detectable by PET - she has not received XRT for this. 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 Last 5FU nivo one week ago. Does suffer with constipation. No blood per rectum.     Today we discussed robo right colectomy and risks to include infection, bleeding, anastomotic leak.  The patient has not had surgery within her abdomen before and thus her risks for completion robotically are low, however, she is obese and this does increase her risks.  I like to discuss with Dr. Medina what it would look like to come off therapy.  Likewise, she will be discussed at our tumor board.  Finally, I like to discuss management of her squamous cell cancer and whether the Nivo has been effective for this and if holding it will be risky for recurrence.   Will plan on robotic right colectomy in roughly 5 weeks which would be 6 weeks from her most recent treatment.  She will also need cardiac clearance given her history.      3/2024  Discussed at TB as well as with Dr Medina and will plan on right colon. Cardiac eval obtained and pt at acceptable level of risk without need for further testing. Doing well today with no new symptoms.       Review of Systems  Complete ROS Surg Onc:   Constitutional: The patient denies new or recent history of general fatigue, no recent weight loss, no change in appetite.   Eyes: No complaints of visual problems, no scleral icterus.   ENT: No complaints of ear pain, no hoarseness, no difficulty swallowing,  no tinnitus and no new masses in head, oral cavity, or neck.   Cardiovascular: No complaints of chest pain, no palpitations, no ankle edema.   Respiratory: No complaints of shortness of breath, no cough.   Gastrointestinal: No complaints of jaundice, no bloody stools, no pale stools.   Genitourinary: No complaints of dysuria, no hematuria, no nocturia, no frequent urination, no urethral discharge.   Musculoskeletal: No complaints of weakness, paralysis, joint stiffness or arthralgias.  Integumentary: No complaints of rash, no new lesions.   Neurological: No complaints of convulsions, no seizures, no dizziness.   Hematologic/Lymphatic: No complaints of easy bruising.   Endocrine:  No hot or cold intolerance.  No polydipsia, polyphagia, or polyuria.  Allergy/immunology:  No environmental allergies.  No food allergies.  Not immunocompromised.      Patient Active Problem List   Diagnosis   • Primary hypertension   • Mixed hyperlipidemia   • Malignant neoplasm of right female breast (HCC)   • Vitamin D deficiency   • Prediabetes   • Right thyroid nodule   • GERD (gastroesophageal reflux disease)   • Obesity   • Metastatic colon cancer to liver (HCC)   • 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HCC)   • Poor appetite   • Anemia   •  Dehydration   • Drug-induced constipation   • Chemotherapy induced neutropenia (HCC)   • Stage 3a chronic kidney disease (HCC)   • Thrombocytopenia (HCC)   • Neuropathy   • Diastolic dysfunction   • Hypokalemia   • Leukemoid reaction   • GOGO (acute kidney injury) (HCC)   • Acute post-operative pain   • At risk for constipation   • At risk for delirium   • Advance care planning   • Physical deconditioning   • History of CVA (cerebrovascular accident)   • Chronic nasal congestion   • Elevated LFTs     Past Medical History:   Diagnosis Date   • Anemia    • Arthritis    • Body mass index (BMI) 40.0-44.9, adult (HCC)    • Breast cancer (HCC) 12/17/2018   • Cancer (HCC)     right breast, colon, liver, right tonsil   • Cervical lymphadenopathy     CT neck on 3/30/2023- Large right level 2A lymphadenopathy as described above and suspicious for neoplasm.  Correlation with histopathology is recommended.  Mild asymmetric prominence of the right palatine tonsil with otherwise no definitive nodular enhancing lesions identified along the course of the aerodigestive tract.     5/26/23- FNA of this node was nonrevealing for tissue etiology, but it d   • Encounter for screening for HIV 07/07/2022   • Follow-up examination 04/04/2023   • GERD (gastroesophageal reflux disease)    • Hyperlipidemia    • Hypertension    • Obesity    • Stroke (HCC)     TIA    • Stroke (HCC)     TIA    • Transaminitis 09/22/2021   • Vasomotor rhinitis     Refilled flonase today. Stopped taking since January 2022     Past Surgical History:   Procedure Laterality Date   • BREAST BIOPSY Right 11/23/2018    us guided bx cancer   • BREAST SURGERY     • COLONOSCOPY     • ESOPHAGOSCOPY N/A 07/20/2023    Procedure: ESOPHAGOSCOPY;  Surgeon: David Chapa MD;  [facility];  Service: ENT   • HEMICOLOECTOMY W/ ANASTOMOSIS Right 3/12/2024    Procedure: RIGHT COLECTOMY WITH ROBOTICS;  Surgeon: Vickie Israel MD;  Location[facility];  Service: Surgical  Oncology   • IR BIOPSY LIVER MASS  06/27/2023   • IR PORT CHECK  01/03/2024   • IR PORT PLACEMENT  07/28/2023   • IR PORT STRIPPING  01/09/2024   • LAPAROTOMY N/A 3/12/2024    Procedure: LAPAROTOMY EXPLORATORY W/ ROBOTICS;  Surgeon: Vickie Israel MD;  [facility];  Service: Surgical Oncology   • UT BRNCHSC INCL FLUOR GDNCE DX W/CELL WASHG SPX N/A 07/20/2023    Procedure: BRONCHOSCOPY;  Surgeon: David Chapa MD;  [facility];  Service: ENT   • UT LARYNGOSCOPY W/BIOPSY MICROSCOPE/TELESCOPE N/A 07/20/2023    Procedure: MICRODIRECT LARYNGOSCOPY WITH BIOPSY;  Surgeon: David Chapa MD;  [facility];  Service: ENT   • UT MASTECTOMY PARTIAL W/AXILLARY LYMPHADENECTOMY Right 12/20/2018    Procedure: ULTRASOUND LOCALIZED PARTIAL MASTECTOMY W/SENTINEL NODE BIOPSY POSS AXILLARY DISSECTION;  Surgeon: Zahida Coronado MD;  Location: [facility];  Service: General   • TUBAL LIGATION     • US GUIDED BREAST BIOPSY RIGHT COMPLETE Right 11/23/2018   • US GUIDED INJECTION FOR RESEARCH STUDY  12/20/2018   • US GUIDED INJECTION FOR RESEARCH STUDY  12/07/2018   • US GUIDED INJECTION FOR RESEARCH STUDY  05/03/2019   • US GUIDED LYMPH NODE BIOPSY RIGHT  05/26/2023     Family History   Problem Relation Age of Onset   • Colon cancer Mother 58   • Hypertension Mother    • Pancreatic cancer Father 60   • Hypertension Daughter    • Hypertension Son      Social History     Socioeconomic History   • Marital status: Single     Spouse name: Not on file   • Number of children: Not on file   • Years of education: Not on file   • Highest education level: Not on file   Occupational History   • Not on file   Tobacco Use   • Smoking status: Never     Passive exposure: Never   • Smokeless tobacco: Never   • Tobacco comments:     NO TOBACCO USE   Vaping Use   • Vaping status: Never Used   Substance and Sexual Activity   • Alcohol use: Never   • Drug use: Never   • Sexual activity: Not on file   Other Topics Concern    • Not on file   Social History Narrative   • Not on file     Social Determinants of Health     Financial Resource Strain: Low Risk  (10/9/2023)    Overall Financial Resource Strain (CARDIA)    • Difficulty of Paying Living Expenses: Not hard at all   Food Insecurity: No Food Insecurity (3/13/2024)    Hunger Vital Sign    • Worried About Running Out of Food in the Last Year: Never true    • Ran Out of Food in the Last Year: Never true   Transportation Needs: No Transportation Needs (3/13/2024)    PRAPARE - Transportation    • Lack of Transportation (Medical): No    • Lack of Transportation (Non-Medical): No   Physical Activity: Not on file   Stress: Not on file   Social Connections: Not on file   Intimate Partner Violence: Not on file   Housing Stability: Low Risk  (3/13/2024)    Housing Stability Vital Sign    • Unable to Pay for Housing in the Last Year: No    • Number of Places Lived in the Last Year: 1    • Unstable Housing in the Last Year: No       Current Outpatient Medications:   •  anastrozole (ARIMIDEX) 1 mg tablet, Take 1 tablet (1 mg total) by mouth daily, Disp: 30 tablet, Rfl: 0  •  atorvastatin (LIPITOR) 40 mg tablet, Take 1 tablet (40 mg total) by mouth daily at bedtime, Disp: 30 tablet, Rfl: 2  •  docusate sodium (COLACE) 50 mg capsule, Take 1 capsule (50 mg total) by mouth 2 (two) times a day, Disp: 90 capsule, Rfl: 1  •  ergocalciferol (VITAMIN D2) 50,000 units, Take 1 capsule (50,000 Units total) by mouth once a week, Disp: 90 capsule, Rfl: 3  •  ferrous sulfate 325 (65 Fe) mg tablet, Take 1 tablet (325 mg total) by mouth daily with breakfast, Disp: 30 tablet, Rfl: 0  •  folic acid (FOLVITE) 1 mg tablet, Take 1 tablet (1 mg total) by mouth in the morning, Disp: 90 tablet, Rfl: 3  •  gabapentin (NEURONTIN) 300 mg capsule, Take 1 capsule (300 mg total) by mouth 3 (three) times a day for 7 days, Disp: 21 capsule, Rfl: 0  •  hydrocortisone 1 % ointment, , Disp: , Rfl:   •  lidocaine-prilocaine (EMLA)  cream, Apply topically as needed for mild pain, Disp: 30 g, Rfl: 3  •  losartan (COZAAR) 50 mg tablet, Take 1 tablet (50 mg total) by mouth 2 (two) times a day, Disp: 60 tablet, Rfl: 0  •  mirtazapine (REMERON) 30 mg tablet, Take 1 tablet (30 mg total) by mouth daily at bedtime, Disp: 30 tablet, Rfl: 3  •  omeprazole (PriLOSEC) 20 mg delayed release capsule, Take 1 capsule (20 mg total) by mouth daily, Disp: 30 capsule, Rfl: 5  •  ondansetron (ZOFRAN) 8 mg tablet, Take 1 tablet (8 mg total) by mouth every 8 (eight) hours as needed for nausea or vomiting, Disp: 30 tablet, Rfl: 3  •  potassium chloride (K-DUR,KLOR-CON) 20 mEq tablet, Take 1 tablet (20 mEq total) by mouth 2 (two) times a day, Disp: 60 tablet, Rfl: 1  •  prochlorperazine (COMPAZINE) 10 mg tablet, Take 1 tablet (10 mg total) by mouth every 6 (six) hours as needed for nausea or vomiting, Disp: 30 tablet, Rfl: 3  •  pyridoxine (VITAMIN B6) 50 mg tablet, Take 1 tablet (50 mg total) by mouth daily, Disp: 90 tablet, Rfl: 0  •  Cyanocobalamin (B-12 PO), Take by mouth (Patient not taking: Reported on 3/7/2024), Disp: , Rfl:   •  polyethylene glycol (MIRALAX) 17 g packet, Take 17 g by mouth daily for 7 days (Patient not taking: Reported on 4/3/2024), Disp: 119 g, Rfl: 0  No Known Allergies    Vitals:    04/03/24 1059   BP: 130/70   Pulse: (!) 113   Temp: 98 °F (36.7 °C)   SpO2: 96%       Physical Exam   General: Appears well, appears stated age  Skin: Warm, anicteric  HEENT: Normocephalic, atraumatic; sclera aniceteric, mucous membranes moist; cervical nodes without adenopathy. RIGHT neck firm nodule at inf margin of mandible appreciated  Cardiopulmonary: RRR, Easy WOB, no BLE edema  Abd: Flat and soft, nontender, no masses appreciated, no hepatosplenomegaly  MSK: Symmetric, no cyanosis, no overt weakness  Lymphatic: No cervical, axillary or inguinal lymphadenopathy  Neuro: Affect appropriate, no gross motor abnormalities      Pathology:  Pending    Labs:  Reviewed in [facility]    Imaging  CT soft tissue neck w contrast    Result Date: 4/4/2023  Narrative: CT NECK WITH CONTRAST INDICATION:   I88.9: Nonspecific lymphadenitis, unspecified.  Right-sided neck swelling for one month. COMPARISON:  None. TECHNIQUE:  Axial, sagittal, and coronal 2D reformatted images were created from the axial source data and submitted for interpretation. Radiation dose length product (DLP) for this visit:  352 mGy-cm .  This examination, like all CT scans performed in the [facility], was performed utilizing techniques to minimize radiation dose exposure, including the use of iterative reconstruction and automated exposure control. IV Contrast:  85 mL of iohexol (OMNIPAQUE) IMAGE QUALITY:  Diagnostic. FINDINGS: VISUALIZED BRAIN PARENCHYMA:  No acute intracranial pathology of the visualized brain parenchyma. VISUALIZED ORBITS: Normal visualized orbits. PARANASAL SINUSES: There is chronic opacification of the right sphenoid sinus with surrounding chronic osteitis.  The medial bony wall along the petrous portion of the right internal carotid artery also appears dehiscent with the adjacent sphenoid sinus.   NASAL CAVITY AND NASOPHARYNX:  Normal. SUPRAHYOID NECK:  There is mild asymmetric prominence of the right palatine tonsil with otherwise no discrete underlying nodular enhancing lesion.  Lingual tonsils appear grossly unremarkable. INFRAHYOID NECK:  Aryepiglottic folds and piriform sinuses are normal.  Normal glottis and subglottic airway. THYROID GLAND:  There is a right thyroid lobe nodule measuring 1.5 x 1.7 cm on series 3, image 47. PAROTID AND SUBMANDIBULAR GLANDS:  Normal. LYMPH NODES:  There is a large right level 2A lymph node with internal cystic change measuring 2.2 x 3.7 x 4.7 cm.  This results in mass effect and anterior displacement of the right submandibular gland. VASCULAR STRUCTURES:  Normal enhancement of the cervical vasculature. THORACIC INLET:  Lung apices and  upper mediastinum are unremarkable. BONY STRUCTURES: No acute fracture or destructive osseous lesion.     Impression: Large right level 2A lymphadenopathy as described above and suspicious for neoplasm.  Correlation with histopathology is recommended. Mild asymmetric prominence of the right palatine tonsil with otherwise no definitive nodular enhancing lesions identified along the course of the aerodigestive tract. Heterogeneous right thyroid lobe nodule.  Ultrasound is recommended for further evaluation.  I personally discussed this study with ERICA ANNE on 4/4/2023 at 9:21 AM. Workstation performed: [id number]       I independently reviewed and interpreted the above laboratory and imaging data, including CT of the neck, breast history, pathology, thyroid US. ENT notes, op notes, path, med onc notes, recent PET.       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 On systemic treatment and has completed 12 cycles folfox. Now on nivo and maintenance 5FU and received last dose one week ago. Recent PET with activity only in colon mass which is more avid than before. Has been discussed at  with agreement for robo right colectomy as discussed previously, Will proceed.

## 2024-04-03 NOTE — PROGRESS NOTES
Surgical Oncology Consultation F/U    [facility] [place]  [address] [facility] [address] [phone]    Patient:  Maira Moura  1955  [id number]    Primary Care provider:  Fanta Turner MD  [address]  [facility] [address]    Referring provider:  No referring provider defined for this encounter.    Diagnoses and all orders for this visit:    Metastatic colon cancer to liver (HCC)  -     MRI abdomen w wo contrast; Future    Other orders  -     hydrocortisone 1 % ointment        Chief Complaint   Patient presents with    Post-op       Return in about 3 months (around 7/3/2024).    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, unresectable    7/31/2023 - FOLFOX    11/20/2023 - opdivo added to FOLFOX    12/18/2023-cycle 11-20% dose reduction of 5-FU bolus and oxaliplatin due to increased fatigue    1/2023 - oxaliplatin removed from treatment plan due to neuropathy - PET scan shows good disease control in all areas except colon lesion     Malignant neoplasm of right female breast (HCC)   11/23/2018 Initial Diagnosis    Malignant neoplasm of right female breast (HCC)     11/23/2018 Biopsy    Rt Breast US BX 1100 8cmfn, 4 passes 12g Marquee:  - Invasive breast carcinoma of no special type (ductal NST/invasive ductal carcinoma).  - grade 2  - %  - DC 95%  - HER2 negative     12/20/2018 Surgery    Right partial mastectomy and SLN biopsy:  Infiltrating ductal carcinoma, Grade 2/3, felt to be between 2.0 cm and 2.5 cm in greatest dimension  - No invasive tumor is seen at inked margins, but there is a focus of DCIS seen within approximately 1mm of the inked medial margin  - no LVI  - no  "LCIS  - 0/2 LN's  at least Stage IIA - pT2, pN0, cM0, G2.    - Dr Coronado     12/20/2018 -  Cancer Staged    Stage IIA - pT2, pN0, cM0, G2.       1/4/2019 Genomic Testing    Oncotype DX score: 13     2/1/2019 -  Hormone Therapy    anastrozole 1 mg daily as adjuvant endocrine therapy    - Dr Daley       2/14/2019 - 4/3/2019 Radiation    Course: C1    Plan ID Energy Fractions Dose per Fraction (cGy) Dose Correction (cGy) Total Dose Delivered (cGy) Elapsed Days   R Breast 10X/6X 25 / 25 200 0 5,000 40   R BRST BOOST 16E 5 / 5 200 0 1,000 7      Treatment dates:  C1: 2/14/2019 - 4/3/2019       Metastatic colon cancer to liver (HCC)   7/6/2023 Genetic Testing    CARIS Results:   KRAS Pathogenic Variant Exon 2  p.G12D : lack of benefit of cetuximab, panitumumab Level 2   No other actionable mutation; MSI stable; TMB low   MiFOLOXAi Results: \"Decreased Benefit of FOLFOX + Bevacizumab in first line metastatic CRC.  This patient may achieve improved results by receiving an alternative to FOLFOX, such as FOLFIRI, as their initial regimen. As an adjustment to frontline FOLFOXIRI following toxicity: This patient may achieve improved results by removing the oxaliplatin portion of their regimen\".         7/11/2023 Initial Diagnosis    Metastatic colon cancer to liver (HCC)     7/13/2023 -  Cancer Staged    Staging form: Colon and Rectum, AJCC 8th Edition  - Clinical stage from 7/13/2023: cT3, cN0, pM1 - Signed by Harika Medina DO on 9/28/2023  Total positive nodes: 0       7/31/2023 -  Chemotherapy    potassium chloride, 20 mEq, Intravenous, Once, 2 of 2 cycles  Administration: 20 mEq (11/6/2023), 20 mEq (11/20/2023)  alteplase (CATHFLO), 2 mg, Intracatheter, Every 1 Minute as needed, 13 of 15 cycles  Administration: 2 mg (1/3/2024)  pegfilgrastim (NEULASTA), 6 mg, Subcutaneous, Once, 7 of 9 cycles  Administration: 6 mg (10/25/2023), 6 mg (11/8/2023), 6 mg (11/22/2023), 6 mg (12/6/2023), 6 mg (12/20/2023), 6 mg (1/12/2024), " 6 mg (1/25/2024)  fluorouracil (ADRUCIL), 895 mg, Intravenous, Once, 13 of 15 cycles  Dose modification: 320 mg/m2 (original dose 400 mg/m2, Cycle 11)  Administration: 900 mg (7/31/2023), 900 mg (8/14/2023), 900 mg (8/28/2023), 900 mg (9/11/2023), 900 mg (9/25/2023), 900 mg (10/9/2023), 900 mg (10/23/2023), 895 mg (11/6/2023), 895 mg (11/20/2023), 895 mg (12/4/2023), 700 mg (12/18/2023), 680 mg (1/10/2024), 680 mg (1/23/2024)  nivolumab (OPDIVO) IVPB, 240 mg (100 % of original dose 240 mg), Intravenous, Once, 5 of 7 cycles  Dose modification: 240 mg (original dose 240 mg, Cycle 9)  Administration: 240 mg (11/20/2023), 240 mg (12/4/2023), 240 mg (12/18/2023), 240 mg (1/10/2024), 240 mg (1/23/2024)  leucovorin calcium IVPB, 896 mg, Intravenous, Once, 13 of 15 cycles  Administration: 900 mg (7/31/2023), 900 mg (8/14/2023), 900 mg (8/28/2023), 900 mg (9/11/2023), 900 mg (9/25/2023), 900 mg (10/9/2023), 900 mg (10/23/2023), 900 mg (11/6/2023), 900 mg (11/20/2023), 900 mg (12/4/2023), 900 mg (12/18/2023), 850 mg (1/10/2024), 850 mg (1/23/2024)  oxaliplatin (ELOXATIN) chemo infusion, 85 mg/m2 = 190.4 mg, Intravenous, Once, 12 of 12 cycles  Dose modification: 68 mg/m2 (original dose 85 mg/m2, Cycle 11, Reason: Other (Must fill in a comment), Comment: increased fatigue)  Administration: 190.4 mg (7/31/2023), 190.4 mg (8/14/2023), 200 mg (8/28/2023), 200 mg (9/11/2023), 200 mg (9/25/2023), 200 mg (10/9/2023), 200 mg (10/23/2023), 200 mg (11/6/2023), 200 mg (11/20/2023), 190.4 mg (12/4/2023), 150 mg (12/18/2023), 150 mg (1/10/2024)  fluorouracil (ADRUCIL) ambulatory infusion Soln, 1,200 mg/m2/day = 5,375 mg, Intravenous, Over 46 hours, 13 of 15 cycles     3/12/2024 Surgery    A. Terminal ileum, appendix, right colon (right hemicolectomy):  - Adenocarcinoma (5.2cm)  - Forty-nine (49) lymph nodes negative for carcinoma (0/49)    - Acellular mucin present in one (1) lymph node   - See staging synoptic (ypT3N0)     Right tonsillar  squamous cell carcinoma (HCC)   7/27/2023 Initial Diagnosis    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HCC)     7/27/2023 -  Cancer Staged    Staging form: Pharynx - Oropharynx, AJCC 8th Edition  - Clinical stage from 7/27/2023: Stage III (cT1, cN3, cM0, p16+) - Signed by Evan Plummer MD on 7/27/2023  Stage prefix: Initial diagnosis           History of Present Illness  :   This is a 68-year-old female seen today with a new right neck nodule.  Briefly, the patient presented to her primary care physician due to what she describes as throat soreness.  A large right neck nodule was identified, prompting a CT scan of the neck.  This demonstrated a near 5 cm right level 2 lymph node with multicystic components.  It also detected a right heterogeneous thyroid nodule of about 2 cm which did not appear to be locally invasive. The patient denies any significant symptoms of throat pain, trouble breathing, trouble swallowing, pain with swallowing, voice changes.  She does describe a sore throat for months if not years.  She states that the right sided neck nodule became enlarged over the last couple of months but it has not really bothered her.  She does have a history of right breast cancer treated with breast conservation in 2018.  Her mammograms since that time have been benign.  She denies any other lumps or bumps of the left neck, axilla, inguinal regions.  She denies any fevers, chills, night sweats, weight loss, other systemic symptoms.    At this juncture, given that she does not have any symptoms concerning for lymphoma and her enlarged lymph nodes are limited to the right neck where she also has a right thyroid nodule, I am concerned about a lymph node involved thyroid cancer.  I would like her to undergo an FNA of the right neck node to determine a baseline histology.  Likewise, she will need to undergo the thyroid ultrasound she is scheduled for as well as a potential right thyroid biopsy depending on  these results.    Interval 6/7/23  Patient presents today for interval follow-up after the above investigations.  Thyroid ultrasound revealed a spongiform nodule with questionable criteria for biopsy.  Percutaneous FNA of the enlarged right cervical node revealed carcinoma, however, they were not able to determine tissue of etiology given the scant tissue.  The patient's symptoms are largely unchanged.    8/2023  Two cycles FOLFOX completed for metastatic colon ca to liver. Also diagnosed with SCC at tonsil with mets to nodes. Plan for eventual XRT it appears. Doing well - swallowing well. Poor appetite but working on it. No abd pain, n/v, trouble with BM    1/2024  Pt seen in f/u. She is status post 12 total cycles of FOLFOX with nivolumab added in November.  PET scan prior to this visit shows good control of all areas of disease with the exception of increase in the primary colon mass. Her SCC is now un-detectable by PET - she has not received XRT for this. 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 Last 5FU nivo one week ago. Does suffer with constipation. No blood per rectum.     Today we discussed robo right colectomy and risks to include infection, bleeding, anastomotic leak.  The patient has not had surgery within her abdomen before and thus her risks for completion robotically are low, however, she is obese and this does increase her risks.  I like to discuss with Dr. Medina what it would look like to come off therapy.  Likewise, she will be discussed at our tumor board.  Finally, I like to discuss management of her squamous cell cancer and whether the Nivo has been effective for this and if holding it will be risky for recurrence.  Will plan on robotic right colectomy in roughly 5 weeks which would be 6 weeks from her most recent treatment.  She will also need cardiac clearance given her history.      3/2024  Discussed at  as well as with Dr Medina and will plan  on right colon. Cardiac eval obtained and pt at acceptable level of risk without need for further testing.     4/2024  S/p robo right colon. Had hard recovery 2/2 preop deconditioning but is now home and doing great. One BM every 3 days and taking stool softeners. Eating well and getting stronger.     Review of Systems  Complete ROS Surg Onc:   Constitutional: The patient denies new or recent history of general fatigue, no recent weight loss, no change in appetite.   Eyes: No complaints of visual problems, no scleral icterus.   ENT: No complaints of ear pain, no hoarseness, no difficulty swallowing,  no tinnitus and no new masses in head, oral cavity, or neck.   Cardiovascular: No complaints of chest pain, no palpitations, no ankle edema.   Respiratory: No complaints of shortness of breath, no cough.   Gastrointestinal: No complaints of jaundice, no bloody stools, no pale stools.   Genitourinary: No complaints of dysuria, no hematuria, no nocturia, no frequent urination, no urethral discharge.   Musculoskeletal: No complaints of weakness, paralysis, joint stiffness or arthralgias.  Integumentary: No complaints of rash, no new lesions.   Neurological: No complaints of convulsions, no seizures, no dizziness.   Hematologic/Lymphatic: No complaints of easy bruising.   Endocrine:  No hot or cold intolerance.  No polydipsia, polyphagia, or polyuria.  Allergy/immunology:  No environmental allergies.  No food allergies.  Not immunocompromised.      Patient Active Problem List   Diagnosis    Primary hypertension    Mixed hyperlipidemia    Malignant neoplasm of right female breast (HCC)    Vitamin D deficiency    Prediabetes    Right thyroid nodule    GERD (gastroesophageal reflux disease)    Obesity    Metastatic colon cancer to liver (HCC)    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HCC)    Poor appetite    Anemia    Dehydration    Drug-induced constipation    Chemotherapy induced neutropenia (HCC)    Stage 3a chronic  kidney disease (HCC)    Thrombocytopenia (HCC)    Neuropathy    Diastolic dysfunction    Hypokalemia    Leukemoid reaction    GOGO (acute kidney injury) (HCC)    Acute post-operative pain    At risk for constipation    At risk for delirium    Advance care planning    Physical deconditioning    History of CVA (cerebrovascular accident)    Chronic nasal congestion    Elevated LFTs     Past Medical History:   Diagnosis Date    Anemia     Arthritis     Body mass index (BMI) 40.0-44.9, adult (HCC)     Breast cancer (HCC) 12/17/2018    Cancer (HCC)     right breast, colon, liver, right tonsil    Cervical lymphadenopathy     CT neck on 3/30/2023- Large right level 2A lymphadenopathy as described above and suspicious for neoplasm.  Correlation with histopathology is recommended.  Mild asymmetric prominence of the right palatine tonsil with otherwise no definitive nodular enhancing lesions identified along the course of the aerodigestive tract.     5/26/23- FNA of this node was nonrevealing for tissue etiology, but it d    Encounter for screening for HIV 07/07/2022    Follow-up examination 04/04/2023    GERD (gastroesophageal reflux disease)     Hyperlipidemia     Hypertension     Obesity     Stroke (HCC)     TIA     Stroke (HCC)     TIA     Transaminitis 09/22/2021    Vasomotor rhinitis     Refilled flonase today. Stopped taking since January 2022     Past Surgical History:   Procedure Laterality Date    BREAST BIOPSY Right 11/23/2018    us guided bx cancer    BREAST SURGERY      COLONOSCOPY      ESOPHAGOSCOPY N/A 07/20/2023    Procedure: ESOPHAGOSCOPY;  Surgeon: David Chapa MD;  [facility]: [facility];  Service: ENT    HEMICOLOECTOMY W/ ANASTOMOSIS Right 3/12/2024    Procedure: RIGHT COLECTOMY WITH ROBOTICS;  Surgeon: Vickie Israel MD;  Location: [facility];  Service: Surgical Oncology    IR BIOPSY LIVER MASS  06/27/2023    IR PORT CHECK  01/03/2024    IR PORT PLACEMENT  07/28/2023    IR PORT STRIPPING  01/09/2024     LAPAROTOMY N/A 3/12/2024    Procedure: LAPAROTOMY EXPLORATORY W/ ROBOTICS;  Surgeon: Vickie Israel MD;  [facility]: [facility];  Service: Surgical Oncology    CO [facility] INCL FLUOR GDNCE DX W/CELL WASHG SPX N/A 07/20/2023    Procedure: BRONCHOSCOPY;  Surgeon: David Chapa MD;  [facility];  Service: ENT    CO LARYNGOSCOPY W/BIOPSY MICROSCOPE/TELESCOPE N/A 07/20/2023    Procedure: MICRODIRECT LARYNGOSCOPY WITH BIOPSY;  Surgeon: David Chapa MD;  [facility]: [facility];  Service: ENT    CO MASTECTOMY PARTIAL W/AXILLARY LYMPHADENECTOMY Right 12/20/2018    Procedure: ULTRASOUND LOCALIZED PARTIAL MASTECTOMY W/SENTINEL NODE BIOPSY POSS AXILLARY DISSECTION;  Surgeon: Zahida Coronado MD;  Location: [facility];  Service: General    TUBAL LIGATION      US GUIDED BREAST BIOPSY RIGHT COMPLETE Right 11/23/2018    US GUIDED INJECTION FOR RESEARCH STUDY  12/20/2018    US GUIDED INJECTION FOR RESEARCH STUDY  12/07/2018    US GUIDED INJECTION FOR RESEARCH STUDY  05/03/2019    US GUIDED LYMPH NODE BIOPSY RIGHT  05/26/2023     Family History   Problem Relation Age of Onset    Colon cancer Mother 58    Hypertension Mother     Pancreatic cancer Father 60    Hypertension Daughter     Hypertension Son      Social History     Socioeconomic History    Marital status: Single     Spouse name: Not on file    Number of children: Not on file    Years of education: Not on file    Highest education level: Not on file   Occupational History    Not on file   Tobacco Use    Smoking status: Never     Passive exposure: Never    Smokeless tobacco: Never    Tobacco comments:     NO TOBACCO USE   Vaping Use    Vaping status: Never Used   Substance and Sexual Activity    Alcohol use: Never    Drug use: Never    Sexual activity: Not on file   Other Topics Concern    Not on file   Social History Narrative    Not on file     Social Determinants of Health     Financial Resource Strain: Low Risk  (10/9/2023)    Overall Financial Resource  Strain (CARDIA)     Difficulty of Paying Living Expenses: Not hard at all   Food Insecurity: No Food Insecurity (3/13/2024)    Hunger Vital Sign     Worried About Running Out of Food in the Last Year: Never true     Ran Out of Food in the Last Year: Never true   Transportation Needs: No Transportation Needs (3/13/2024)    PRAPARE - Transportation     Lack of Transportation (Medical): No     Lack of Transportation (Non-Medical): No   Physical Activity: Not on file   Stress: Not on file   Social Connections: Not on file   Intimate Partner Violence: Not on file   Housing Stability: Low Risk  (3/13/2024)    Housing Stability Vital Sign     Unable to Pay for Housing in the Last Year: No     Number of Places Lived in the Last Year: 1     Unstable Housing in the Last Year: No       Current Outpatient Medications:     anastrozole (ARIMIDEX) 1 mg tablet, Take 1 tablet (1 mg total) by mouth daily, Disp: 30 tablet, Rfl: 0    atorvastatin (LIPITOR) 40 mg tablet, Take 1 tablet (40 mg total) by mouth daily at bedtime, Disp: 30 tablet, Rfl: 2    docusate sodium (COLACE) 50 mg capsule, Take 1 capsule (50 mg total) by mouth 2 (two) times a day, Disp: 90 capsule, Rfl: 1    ergocalciferol (VITAMIN D2) 50,000 units, Take 1 capsule (50,000 Units total) by mouth once a week, Disp: 90 capsule, Rfl: 3    ferrous sulfate 325 (65 Fe) mg tablet, Take 1 tablet (325 mg total) by mouth daily with breakfast, Disp: 30 tablet, Rfl: 0    folic acid (FOLVITE) 1 mg tablet, Take 1 tablet (1 mg total) by mouth in the morning, Disp: 90 tablet, Rfl: 3    gabapentin (NEURONTIN) 300 mg capsule, Take 1 capsule (300 mg total) by mouth 3 (three) times a day for 7 days, Disp: 21 capsule, Rfl: 0    hydrocortisone 1 % ointment, , Disp: , Rfl:     lidocaine-prilocaine (EMLA) cream, Apply topically as needed for mild pain, Disp: 30 g, Rfl: 3    losartan (COZAAR) 50 mg tablet, Take 1 tablet (50 mg total) by mouth 2 (two) times a day, Disp: 60 tablet, Rfl: 0     mirtazapine (REMERON) 30 mg tablet, Take 1 tablet (30 mg total) by mouth daily at bedtime, Disp: 30 tablet, Rfl: 3    omeprazole (PriLOSEC) 20 mg delayed release capsule, Take 1 capsule (20 mg total) by mouth daily, Disp: 30 capsule, Rfl: 5    ondansetron (ZOFRAN) 8 mg tablet, Take 1 tablet (8 mg total) by mouth every 8 (eight) hours as needed for nausea or vomiting, Disp: 30 tablet, Rfl: 3    potassium chloride (K-DUR,KLOR-CON) 20 mEq tablet, Take 1 tablet (20 mEq total) by mouth 2 (two) times a day, Disp: 60 tablet, Rfl: 1    prochlorperazine (COMPAZINE) 10 mg tablet, Take 1 tablet (10 mg total) by mouth every 6 (six) hours as needed for nausea or vomiting, Disp: 30 tablet, Rfl: 3    pyridoxine (VITAMIN B6) 50 mg tablet, Take 1 tablet (50 mg total) by mouth daily, Disp: 90 tablet, Rfl: 0    Cyanocobalamin (B-12 PO), Take by mouth (Patient not taking: Reported on 3/7/2024), Disp: , Rfl:     polyethylene glycol (MIRALAX) 17 g packet, Take 17 g by mouth daily for 7 days (Patient not taking: Reported on 4/3/2024), Disp: 119 g, Rfl: 0  No Known Allergies    Vitals:    04/03/24 1059   BP: 130/70   Pulse: (!) 113   Temp: 98 °F (36.7 °C)   SpO2: 96%       Physical Exam   General: Appears well, appears stated age  Skin: Warm, anicteric  HEENT: Normocephalic, atraumatic; sclera aniceteric, mucous membranes moist; cervical nodes without adenopathy. RIGHT neck firm nodule at inf margin of mandible appreciated  Cardiopulmonary: RRR, Easy WOB, no BLE edema  Abd: Flat and soft, nontender, no masses appreciated, no hepatosplenomegaly  MSK: Symmetric, no cyanosis, no overt weakness  Lymphatic: No cervical, axillary or inguinal lymphadenopathy  Neuro: Affect appropriate, no gross motor abnormalities      Pathology:  Pending    Labs: Reviewed in EPIC    Imaging  CT soft tissue neck w contrast    Result Date: 4/4/2023  Narrative: CT NECK WITH CONTRAST INDICATION:   I88.9: Nonspecific lymphadenitis, unspecified.  Right-sided neck  swelling for one month. COMPARISON:  None. TECHNIQUE:  Axial, sagittal, and coronal 2D reformatted images were created from the axial source data and submitted for interpretation. Radiation dose length product (DLP) for this visit:  352 mGy-cm .  This examination, like all CT scans performed in the [facility], was performed utilizing techniques to minimize radiation dose exposure, including the use of iterative reconstruction and automated exposure control. IV Contrast:  85 mL of iohexol (OMNIPAQUE) IMAGE QUALITY:  Diagnostic. FINDINGS: VISUALIZED BRAIN PARENCHYMA:  No acute intracranial pathology of the visualized brain parenchyma. VISUALIZED ORBITS: Normal visualized orbits. PARANASAL SINUSES: There is chronic opacification of the right sphenoid sinus with surrounding chronic osteitis.  The medial bony wall along the petrous portion of the right internal carotid artery also appears dehiscent with the adjacent sphenoid sinus.   NASAL CAVITY AND NASOPHARYNX:  Normal. SUPRAHYOID NECK:  There is mild asymmetric prominence of the right palatine tonsil with otherwise no discrete underlying nodular enhancing lesion.  Lingual tonsils appear grossly unremarkable. INFRAHYOID NECK:  Aryepiglottic folds and piriform sinuses are normal.  Normal glottis and subglottic airway. THYROID GLAND:  There is a right thyroid lobe nodule measuring 1.5 x 1.7 cm on series 3, image 47. PAROTID AND SUBMANDIBULAR GLANDS:  Normal. LYMPH NODES:  There is a large right level 2A lymph node with internal cystic change measuring 2.2 x 3.7 x 4.7 cm.  This results in mass effect and anterior displacement of the right submandibular gland. VASCULAR STRUCTURES:  Normal enhancement of the cervical vasculature. THORACIC INLET:  Lung apices and upper mediastinum are unremarkable. BONY STRUCTURES: No acute fracture or destructive osseous lesion.     Impression: Large right level 2A lymphadenopathy as described above and suspicious for  neoplasm.  Correlation with histopathology is recommended. Mild asymmetric prominence of the right palatine tonsil with otherwise no definitive nodular enhancing lesions identified along the course of the aerodigestive tract. Heterogeneous right thyroid lobe nodule.  Ultrasound is recommended for further evaluation.  I personally discussed this study with ERICA ANNE on 4/4/2023 at 9:21 AM. Workstation performed: [id number]       I independently reviewed and interpreted the above laboratory and imaging data, including CT of the neck, breast history, pathology, thyroid US. ENT notes, op notes, path, med onc notes, recent PET.       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 On systemic treatment and has completed 12 cycles folfox. Was on nivo and maintenance 5FU and f/u PET 1/2024 with activity only in colon mass which was more avid than before; was also having abd discomfort and sx c/w partial obstruction. Now s/p RT robo hemicolectomy and doing very well. Path with 0/49 nodes positive, T3 tumor with basically zero response to chemo. Given low burden of disease, I will obtain an MR abdomen to see if liver direct therapies can be offered for remaining tumors. She is seeing Dr Medina 4/12 to discuss ongoing systemic management as well. I will call her with MR results after discussion with Dr Medina and will otherwise see her in 3 mo.

## 2024-04-03 NOTE — PROGRESS NOTES
Pt called stating she has not received a phone call confirming her transportation for today.  E mail sent to [employer] and they confirmed her ride will be there within 10 minutes.  Called pt to inform her, she understood, and was thankful for the assistance

## 2024-04-05 ENCOUNTER — HOME CARE VISIT (OUTPATIENT)
Dept: HOME HEALTH SERVICES | Facility: HOME HEALTHCARE | Age: 69
End: 2024-04-05

## 2024-04-05 ENCOUNTER — PATIENT OUTREACH (OUTPATIENT)
Dept: HEMATOLOGY ONCOLOGY | Facility: CLINIC | Age: 69
End: 2024-04-05

## 2024-04-05 ENCOUNTER — OFFICE VISIT (OUTPATIENT)
Dept: CARDIOLOGY CLINIC | Facility: CLINIC | Age: 69
End: 2024-04-05
Payer: MEDICARE

## 2024-04-05 VITALS
HEIGHT: 65 IN | DIASTOLIC BLOOD PRESSURE: 70 MMHG | WEIGHT: 198.2 LBS | BODY MASS INDEX: 33.02 KG/M2 | OXYGEN SATURATION: 95 % | HEART RATE: 115 BPM | SYSTOLIC BLOOD PRESSURE: 114 MMHG

## 2024-04-05 DIAGNOSIS — I10 PRIMARY HYPERTENSION: ICD-10-CM

## 2024-04-05 DIAGNOSIS — I51.89 DIASTOLIC DYSFUNCTION: Primary | ICD-10-CM

## 2024-04-05 DIAGNOSIS — E78.2 MIXED HYPERLIPIDEMIA: ICD-10-CM

## 2024-04-05 DIAGNOSIS — R42 LIGHTHEADEDNESS: ICD-10-CM

## 2024-04-05 DIAGNOSIS — Z79.899 HIGH RISK MEDICATION USE: ICD-10-CM

## 2024-04-05 DIAGNOSIS — Z92.21 STATUS POST ADMINISTRATION OF CARDIOTOXIC CHEMOTHERAPY: ICD-10-CM

## 2024-04-05 PROCEDURE — 99214 OFFICE O/P EST MOD 30 MIN: CPT | Performed by: INTERNAL MEDICINE

## 2024-04-05 NOTE — PROGRESS NOTES
Cardio-Oncology Clinic Note    Maira Moura 69 y.o. 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  Right sided.  S/p XRT  Colon cancer  Dx 2023  Metastatic to liver  S/p surgery 3/2024  Current therapy:  Opdivo (nivolumab) and FOLFOX  - HELD FOR the recent surgery  anastrazole    # Diastolic dysfunction on echo  Not unexpected echo finding given age, HTN, obesity  Reports mild MOSHER  Exam - euvolemic  BNP - normal  No medical therapy for this unless clinical CHF / volume overload    # lightheadedness  positional.   Improved with better hydration    # HTN  Losartan 50mg daily --> recently increased to 50mg BID by the rehab  /70  Continue to monitor home BPs.  We may want to go back to 50mg daily if any low values.    # HLD  Of note, does have coronary calcifications on CT  On Lipitor 40mg qhs  Last  which is quite high.  Recently started compliance with the statin.  So we will repeat lipids    # Obesity  BMI 32  (has trended down recently)    # Hx TIA  2017.    # High risk medication use   immunotherapy - risk of myocarditis.  Check baseline troponin.   5-FU - risk of coronary vasospasm.  Continue to monitor      Today's Plan Summary:  See above assessment/plan for full details of today's plan.  Briefly,     Fasting lipids  Troponin              Reason For Visit / Chief Complaint:  F/u diastolic dysfunction, HTN, HLD    HPI:   Maira Moura is a 69 y.o.  female with history as noted in the problem list and further detailed in the above assessment and plan.    Initial:   Dec 2023  Referred by Dr. Medina for positional lightheadedness and diastolic dysfunction on echo.  As above, the patient has a history of breast cancer and more recently colon cancer and squamous cell carcinoma of the tonsil.  The patient has been started on  immunotherapy and FOLFOX.  At the recent oncology visit she reported some positional lightheadedness.  An echo was obtained demonstrating diastolic dysfunction.   Today, the patient reports - fatigue is main complaint.   Also has lightheadedness.   Episodes last a few minutes.  Can be when just laying there.  Can also be positional.   Describes sx's as a dizziness.  Sx's started before chemotherapy but worse after starting.   No syncope.   No CP.   Some SOB.  Some MOSHER.  No palpitations.    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   Single.  5 kids.    Interval:  Last visit -->   feeling about the same.  Tried hydrating better - helped dizziness a little bit.    Plan last visit -->   Still needs baseline troponin  Fasting lipids      Never got troponin or lipids    Had colectomy in March.  Then went to rehab.  Just got home.     Labs 4/1/24 -   Cr 0.58.     Today - feels fatigue.  No CP.   No SOB.   No longer having positional dizziness.  No syncope.          Cardiac Imaging personally reviewed:  EKG 6/2023  NSR    12-12-23  Sinus tachycardia at 104 bpm.  Otherwise, normal EKG.       Holter or event monitor    Echo 10/2023  Mild LVH.  EF 65% (on my reivew).  Grade 1 diastolic dysfunction.  Normal RV size and function.  No significant valve disease         JAZMYN    Cardiac MRI    Stress testing    Coronary CTA or [facility]    CPET              Patient Active Problem List    Diagnosis Date Noted   • Elevated LFTs 03/25/2024   • Chronic nasal congestion 03/18/2024   • Hypokalemia 03/15/2024   • Leukemoid reaction 03/15/2024   • GOGO (acute kidney injury) (HCC) 03/15/2024   • Acute post-operative pain 03/15/2024   • At risk for constipation 03/15/2024   • At risk for delirium 03/15/2024   • Advance care planning 03/15/2024   • Physical deconditioning 03/15/2024   • History of CVA (cerebrovascular accident) 03/15/2024   • Diastolic dysfunction 01/12/2024   • Neuropathy 01/11/2024   • Thrombocytopenia (HCC)  12/29/2023   • Stage 3a chronic kidney disease (HCC) 12/12/2023   • Chemotherapy induced neutropenia (HCC) 11/09/2023   • Dehydration 10/06/2023   • Drug-induced constipation 10/06/2023   • Anemia 08/24/2023   • Poor appetite 08/03/2023   • 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HCC) 07/27/2023   • Metastatic colon cancer to liver (HCC) 07/11/2023   • GERD (gastroesophageal reflux disease)    • Obesity    • Right thyroid nodule 04/04/2023   • Prediabetes 07/07/2022   • Vitamin D deficiency 01/25/2019   • Malignant neoplasm of right female breast (HCC) 12/20/2018   • Mixed hyperlipidemia 08/09/2018   • Primary hypertension 07/27/2017       Past Medical History:   Diagnosis Date   • Anemia    • Arthritis    • Body mass index (BMI) 40.0-44.9, adult (HCC)    • Breast cancer (HCC) 12/17/2018   • Cancer (HCC)     right breast, colon, liver, right tonsil   • Cervical lymphadenopathy     CT neck on 3/30/2023- Large right level 2A lymphadenopathy as described above and suspicious for neoplasm.  Correlation with histopathology is recommended.  Mild asymmetric prominence of the right palatine tonsil with otherwise no definitive nodular enhancing lesions identified along the course of the aerodigestive tract.     5/26/23- FNA of this node was nonrevealing for tissue etiology, but it d   • Encounter for screening for HIV 07/07/2022   • Follow-up examination 04/04/2023   • GERD (gastroesophageal reflux disease)    • Hyperlipidemia    • Hypertension    • Obesity    • Stroke (HCC)     TIA    • Stroke (HCC)     TIA    • Transaminitis 09/22/2021   • Vasomotor rhinitis     Refilled flonase today. Stopped taking since January 2022       No Known Allergies    Current Outpatient Medications   Medication Instructions   • anastrozole (ARIMIDEX) 1 mg, Oral, Daily   • atorvastatin (LIPITOR) 40 mg, Oral, Daily at bedtime   • Cyanocobalamin (B-12 PO) Oral   • docusate sodium (COLACE) 50 mg, Oral, 2 times daily   • ergocalciferol (VITAMIN D2)  50,000 Units, Oral, Weekly   • ferrous sulfate 325 mg, Oral, Daily with breakfast   • folic acid (FOLVITE) 1 mg, Oral, Daily   • gabapentin (NEURONTIN) 300 mg, Oral, 3 times daily   • hydrocortisone 1 % ointment    • lidocaine-prilocaine (EMLA) cream Topical, As needed   • losartan (COZAAR) 50 mg, Oral, 2 times daily   • mirtazapine (REMERON) 30 mg, Oral, Daily at bedtime   • omeprazole (PRILOSEC) 20 mg, Oral, Daily   • ondansetron (ZOFRAN) 8 mg, Oral, Every 8 hours PRN   • potassium chloride (K-DUR,KLOR-CON) 20 mEq tablet 20 mEq, Oral, 2 times daily   • prochlorperazine (COMPAZINE) 10 mg, Oral, Every 6 hours PRN   • pyridoxine (VITAMIN B6) 50 mg, Oral, Daily       Social History     Socioeconomic History   • Marital status: Single     Spouse name: Not on file   • Number of children: Not on file   • Years of education: Not on file   • Highest education level: Not on file   Occupational History   • Not on file   Tobacco Use   • Smoking status: Never     Passive exposure: Never   • Smokeless tobacco: Never   • Tobacco comments:     NO TOBACCO USE   Vaping Use   • Vaping status: Never Used   Substance and Sexual Activity   • Alcohol use: Never   • Drug use: Never   • Sexual activity: Not on file   Other Topics Concern   • Not on file   Social History Narrative   • Not on file     Social Determinants of Health     Financial Resource Strain: Low Risk  (10/9/2023)    Overall Financial Resource Strain (CARDIA)    • Difficulty of Paying Living Expenses: Not hard at all   Food Insecurity: No Food Insecurity (3/13/2024)    Hunger Vital Sign    • Worried About Running Out of Food in the Last Year: Never true    • Ran Out of Food in the Last Year: Never true   Transportation Needs: No Transportation Needs (3/13/2024)    PRAPARE - Transportation    • Lack of Transportation (Medical): No    • Lack of Transportation (Non-Medical): No   Physical Activity: Not on file   Stress: Not on file   Social Connections: Not on file  "  Intimate Partner Violence: Not on file   Housing Stability: Low Risk  (3/13/2024)    Housing Stability Vital Sign    • Unable to Pay for Housing in the Last Year: No    • Number of Places Lived in the Last Year: 1    • Unstable Housing in the Last Year: No       Family History   Problem Relation Age of Onset   • Colon cancer Mother 58   • Hypertension Mother    • Pancreatic cancer Father 60   • Hypertension Daughter    • Hypertension Son        Physical Exam:  Blood pressure 114/70, pulse (!) 115, height 5' 5\" (1.651 m), weight 89.9 kg (198 lb 3.2 oz), SpO2 95%, not currently breastfeeding.  Body mass index is 32.98 kg/m².  Wt Readings from Last 3 Encounters:   04/05/24 89.9 kg (198 lb 3.2 oz)   04/03/24 90.8 kg (200 lb 3.2 oz)   03/12/24 97.5 kg (215 lb)     Physical Exam  Vitals reviewed.   Constitutional:       General: She is not in acute distress.     Appearance: She is not toxic-appearing.   Neck:      Comments: JVP is not elevated  Cardiovascular:      Rate and Rhythm: Regular rhythm. Tachycardia present.      Heart sounds: No murmur heard.     No friction rub. No gallop.   Pulmonary:      Breath sounds: Normal breath sounds. No wheezing, rhonchi or rales.   Musculoskeletal:      Right lower leg: No edema.      Left lower leg: No edema.   Neurological:      Mental Status: She is alert.         Labs & Results:  Lab Results   Component Value Date    SODIUM 138 04/01/2024    K 3.9 04/01/2024     04/01/2024    CO2 24 04/01/2024    BUN 11 04/01/2024    CREATININE 0.58 (L) 04/01/2024    GLUC 86 04/01/2024    CALCIUM 9.0 04/01/2024     No results found for: \"NTBNP\"       Thank you for the opportunity to participate in the care of this patient.    Matthew Whitfield MD, [facility]  Staff Cardiologist  Director of Cardio-Oncology  [facility]  "

## 2024-04-05 NOTE — PROGRESS NOTES
Pt called stating she has an  appt with cardiology oncology, and is not sure if transportation has been set up.  E mailed  [facility]   and  a  LYFT  will be sent out to get her to her appt today with oncology cardiology

## 2024-04-05 NOTE — PATIENT INSTRUCTIONS
Go to the lab to get your cholesterol checked.  This is supposed to be fasting.  I will also have them check a troponin test which is a lab to monitor the chemotherapy.

## 2024-04-09 ENCOUNTER — TELEPHONE (OUTPATIENT)
Dept: HEMATOLOGY ONCOLOGY | Facility: CLINIC | Age: 69
End: 2024-04-09

## 2024-04-09 ENCOUNTER — PATIENT OUTREACH (OUTPATIENT)
Dept: HEMATOLOGY ONCOLOGY | Facility: CLINIC | Age: 69
End: 2024-04-09

## 2024-04-09 ENCOUNTER — DOCUMENTATION (OUTPATIENT)
Dept: HEMATOLOGY ONCOLOGY | Facility: CLINIC | Age: 69
End: 2024-04-09

## 2024-04-09 NOTE — PROGRESS NOTES
In-basket message received from Dr. Israel to add patient to the liver [facility] on 4/26/2024. Chart reviewed and prep completed.

## 2024-04-09 NOTE — TELEPHONE ENCOUNTER
Left  for patient notifying her on her appt change from 3:40pm to 1240pm. I informed her that [facility] was notified as well. I left my direct teams number for her to callback.

## 2024-04-09 NOTE — PROGRESS NOTES
Pt called to confirm  [facility]  is aware of her appointment time change for her medical oncology appt scheduled for 4/12/2024.  E mail sent to  [facility]  and response received that they are aware of the appointment time change

## 2024-04-12 ENCOUNTER — OFFICE VISIT (OUTPATIENT)
Dept: HEMATOLOGY ONCOLOGY | Facility: CLINIC | Age: 69
End: 2024-04-12
Payer: MEDICARE

## 2024-04-12 ENCOUNTER — TELEPHONE (OUTPATIENT)
Dept: HEMATOLOGY ONCOLOGY | Facility: CLINIC | Age: 69
End: 2024-04-12

## 2024-04-12 VITALS
HEIGHT: 65 IN | TEMPERATURE: 97.5 F | OXYGEN SATURATION: 98 % | SYSTOLIC BLOOD PRESSURE: 114 MMHG | DIASTOLIC BLOOD PRESSURE: 98 MMHG | HEART RATE: 115 BPM | WEIGHT: 194 LBS | RESPIRATION RATE: 17 BRPM | BODY MASS INDEX: 32.32 KG/M2

## 2024-04-12 DIAGNOSIS — C78.7 METASTATIC COLON CANCER TO LIVER (HCC): Primary | ICD-10-CM

## 2024-04-12 DIAGNOSIS — C18.9 METASTATIC COLON CANCER TO LIVER (HCC): Primary | ICD-10-CM

## 2024-04-12 DIAGNOSIS — C50.411 MALIGNANT NEOPLASM OF UPPER-OUTER QUADRANT OF RIGHT BREAST IN FEMALE, ESTROGEN RECEPTOR POSITIVE (HCC): ICD-10-CM

## 2024-04-12 DIAGNOSIS — Z17.0 MALIGNANT NEOPLASM OF UPPER-OUTER QUADRANT OF RIGHT BREAST IN FEMALE, ESTROGEN RECEPTOR POSITIVE (HCC): ICD-10-CM

## 2024-04-12 LAB
CARIS GENOMIC LOH - EXOME: NORMAL
CARIS HER2/NEU: NEGATIVE
CARIS MISMATCH REPAIR STATUS: NORMAL
CARIS MLH1: NORMAL
CARIS MSH2: NORMAL
CARIS MSH6: NORMAL
CARIS MSI - EXOME: NORMAL
CARIS PD-L1 (SP142): NEGATIVE
CARIS PMS2: NORMAL
CARIS PTEN: POSITIVE
CARIS TMB - EXOME: NORMAL

## 2024-04-12 PROCEDURE — 99214 OFFICE O/P EST MOD 30 MIN: CPT | Performed by: INTERNAL MEDICINE

## 2024-04-12 PROCEDURE — G2211 COMPLEX E/M VISIT ADD ON: HCPCS | Performed by: INTERNAL MEDICINE

## 2024-04-12 RX ORDER — DEXTROSE MONOHYDRATE 50 MG/ML
20 INJECTION, SOLUTION INTRAVENOUS ONCE
OUTPATIENT
Start: 2024-05-21

## 2024-04-12 RX ORDER — FLUOROURACIL 50 MG/ML
320 INJECTION, SOLUTION INTRAVENOUS ONCE
Status: CANCELLED | OUTPATIENT
Start: 2024-05-07

## 2024-04-12 RX ORDER — ANASTROZOLE 1 MG/1
1 TABLET ORAL DAILY
Qty: 90 TABLET | Refills: 3 | Status: SHIPPED | OUTPATIENT
Start: 2024-04-12

## 2024-04-12 RX ORDER — MIRTAZAPINE 15 MG/1
15 TABLET, FILM COATED ORAL
Qty: 30 TABLET | Refills: 3 | Status: SHIPPED | OUTPATIENT
Start: 2024-04-12

## 2024-04-12 RX ORDER — SODIUM CHLORIDE 9 MG/ML
20 INJECTION, SOLUTION INTRAVENOUS ONCE AS NEEDED
OUTPATIENT
Start: 2024-05-07

## 2024-04-12 RX ORDER — DEXTROSE MONOHYDRATE 50 MG/ML
20 INJECTION, SOLUTION INTRAVENOUS ONCE
OUTPATIENT
Start: 2024-05-07

## 2024-04-12 RX ORDER — SODIUM CHLORIDE 9 MG/ML
20 INJECTION, SOLUTION INTRAVENOUS ONCE AS NEEDED
OUTPATIENT
Start: 2024-05-21

## 2024-04-12 RX ORDER — FLUOROURACIL 50 MG/ML
320 INJECTION, SOLUTION INTRAVENOUS ONCE
Status: CANCELLED | OUTPATIENT
Start: 2024-05-21

## 2024-04-14 NOTE — PROGRESS NOTES
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, unresectable    2023 - FOLFOX    2023 - opdivo added to FOLFOX    2023-cycle 11-20% dose reduction of 5-FU bolus and oxaliplatin due to increased fatigue    2023 - oxaliplatin removed from treatment plan due to neuropathy - PET scan shows good disease control in all areas except colon lesion    3/2024 - right hemicolectomy - pT3N0     Malignant neoplasm of right female breast (HCC)   2018 Initial Diagnosis    Malignant neoplasm of right female breast (HCC)     2018 Biopsy    Rt Breast US BX 1100 8cmfn, 4 passes 12g Marquee:  - Invasive breast carcinoma of no special type (ductal NST/invasive ductal carcinoma).  - grade 2  - %  - WY 95%  - HER2 negative     2018 Surgery    Right partial mastectomy and SLN biopsy:  Infiltrating ductal carcinoma, Grade 2/3, felt to be between 2.0 cm and 2.5 cm in greatest dimension  - No invasive tumor is seen at inked margins, but there is a focus of DCIS seen within approximately 1mm of the inked medial margin  - no LVI  - no LCIS  - 0/2 LN's  at least Stage IIA - pT2, pN0, cM0, G2.    - Dr Coronado     2018 -  Cancer Staged    Stage IIA - pT2, pN0, cM0, G2.       2019 Genomic Testing    Oncotype DX score: 13     2019 -  Hormone Therapy    anastrozole 1 mg daily as adjuvant endocrine therapy    - Dr Daley       2019 - 4/3/2019 Radiation    Course: C1    Plan ID Energy Fractions Dose  "per Fraction (cGy) Dose Correction (cGy) Total Dose Delivered (cGy) Elapsed Days   R Breast 10X/6X 25 / 25 200 0 5,000 40   R BRST BOOST 16E 5 / 5 200 0 1,000 7      Treatment dates:  C1: 2/14/2019 - 4/3/2019       Metastatic colon cancer to liver (HCC)   7/6/2023 Genetic Testing    CARIS Results:   KRAS Pathogenic Variant Exon 2  p.G12D : lack of benefit of cetuximab, panitumumab Level 2   No other actionable mutation; MSI stable; TMB low   MiFOLOXAi Results: \"Decreased Benefit of FOLFOX + Bevacizumab in first line metastatic CRC.  This patient may achieve improved results by receiving an alternative to FOLFOX, such as FOLFIRI, as their initial regimen. As an adjustment to frontline FOLFOXIRI following toxicity: This patient may achieve improved results by removing the oxaliplatin portion of their regimen\".         7/11/2023 Initial Diagnosis    Metastatic colon cancer to liver (HCC)     7/13/2023 -  Cancer Staged    Staging form: Colon and Rectum, AJCC 8th Edition  - Clinical stage from 7/13/2023: cT3, cN0, pM1 - Signed by Harika Medina DO on 9/28/2023  Total positive nodes: 0       7/31/2023 -  Chemotherapy    potassium chloride, 20 mEq, Intravenous, Once, 2 of 2 cycles  Administration: 20 mEq (11/6/2023), 20 mEq (11/20/2023)  alteplase (CATHFLO), 2 mg, Intracatheter, Every 1 Minute as needed, 13 of 19 cycles  Administration: 2 mg (1/3/2024)  pegfilgrastim (NEULASTA), 6 mg, Subcutaneous, Once, 7 of 13 cycles  Administration: 6 mg (10/25/2023), 6 mg (11/8/2023), 6 mg (11/22/2023), 6 mg (12/6/2023), 6 mg (12/20/2023), 6 mg (1/12/2024), 6 mg (1/25/2024)  fluorouracil (ADRUCIL), 895 mg, Intravenous, Once, 13 of 19 cycles  Dose modification: 320 mg/m2 (original dose 400 mg/m2, Cycle 11)  Administration: 900 mg (7/31/2023), 900 mg (8/14/2023), 900 mg (8/28/2023), 900 mg (9/11/2023), 900 mg (9/25/2023), 900 mg (10/9/2023), 900 mg (10/23/2023), 895 mg (11/6/2023), 895 mg (11/20/2023), 895 mg (12/4/2023), 700 mg " (12/18/2023), 680 mg (1/10/2024), 680 mg (1/23/2024)  nivolumab (OPDIVO) IVPB, 240 mg (100 % of original dose 240 mg), Intravenous, Once, 5 of 11 cycles  Dose modification: 240 mg (original dose 240 mg, Cycle 9)  Administration: 240 mg (11/20/2023), 240 mg (12/4/2023), 240 mg (12/18/2023), 240 mg (1/10/2024), 240 mg (1/23/2024)  leucovorin calcium IVPB, 896 mg, Intravenous, Once, 13 of 19 cycles  Administration: 900 mg (7/31/2023), 900 mg (8/14/2023), 900 mg (8/28/2023), 900 mg (9/11/2023), 900 mg (9/25/2023), 900 mg (10/9/2023), 900 mg (10/23/2023), 900 mg (11/6/2023), 900 mg (11/20/2023), 900 mg (12/4/2023), 900 mg (12/18/2023), 850 mg (1/10/2024), 850 mg (1/23/2024)  oxaliplatin (ELOXATIN) chemo infusion, 85 mg/m2 = 190.4 mg, Intravenous, Once, 12 of 12 cycles  Dose modification: 68 mg/m2 (original dose 85 mg/m2, Cycle 11, Reason: Other (Must fill in a comment), Comment: increased fatigue)  Administration: 190.4 mg (7/31/2023), 190.4 mg (8/14/2023), 200 mg (8/28/2023), 200 mg (9/11/2023), 200 mg (9/25/2023), 200 mg (10/9/2023), 200 mg (10/23/2023), 200 mg (11/6/2023), 200 mg (11/20/2023), 190.4 mg (12/4/2023), 150 mg (12/18/2023), 150 mg (1/10/2024)  fluorouracil (ADRUCIL) ambulatory infusion Soln, 1,200 mg/m2/day = 5,375 mg, Intravenous, Over 46 hours, 13 of 19 cycles     9/26/2023 -  Cancer Staged    Staging form: Colon and Rectum, AJCC 8th Edition  - Pathologic: pT3, pN0, cM1 - Signed by Vickie Israel MD on 4/3/2024  Total positive nodes: 0       3/12/2024 Surgery    A. Terminal ileum, appendix, right colon (right hemicolectomy):  - Adenocarcinoma (5.2cm)  - Forty-nine (49) lymph nodes negative for carcinoma (0/49)    - Acellular mucin present in one (1) lymph node   - See staging synoptic (ypT3N0)     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HCC)   7/27/2023 Initial Diagnosis    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HCC)     7/27/2023 -  Cancer Staged    Staging form: Pharynx - Oropharynx, AJCC 8th  Edition  - Clinical stage from 7/27/2023: Stage III (cT1, cN3, cM0, p16+) - Signed by Evan Plummer MD on 7/27/2023  Stage prefix: Initial diagnosis           Interval History: Patient presents for follow up of her metastatic rectal cancer and at least locally advanced carcinoma of the tonsil.  She has been on a treatment break to undergo right hemicolectomy under the direction of Dr. Israel.  Pathology from that specimen shows a pT3 N0 lesion.  The patient continues to lose weight.  She has lost 25 pounds since early January.  She continues to endorse a lack of appetite.  She is on mirtazapine 30 mg at night.  It is not causing any excessive sedation.  She has not noticed any increase in the lymphadenopathy in her right neck.  She denies any bright red blood per rectum.  She has some constipation is trying to take an of stool softener to go every day.  Usually she goes every other day.  She saw our cardio oncologist for an evaluation and overall her lightheadedness is better.  She has been home for 2 weeks from rehab after her surgery and has not had any falls.  Most recent CBC shows a hemoglobin of 7.8.         REVIEW OF SYSTEMS:  Please note that a 14-point review of systems was performed to include Constitutional, HEENT, Respiratory, CVS, GI, , Musculoskeletal, Integumentary, Neurologic, Rheumatologic, Endocrinologic, Psychiatric, Lymphatic, and Hematologic/Oncologic systems were reviewed and are negative unless otherwise stated in HPI. Positive and negative findings pertinent to this evaluation are incorporated into the history of present illness.      ECOG PS: 1    PROBLEM LIST:  Patient Active Problem List   Diagnosis    Primary hypertension    Mixed hyperlipidemia    Malignant neoplasm of right female breast (HCC)    Vitamin D deficiency    Prediabetes    Right thyroid nodule    GERD (gastroesophageal reflux disease)    Obesity    Metastatic colon cancer to liver (HCC)    Right tonsillar squamous  cell carcinoma (HCC)    Poor appetite    Anemia    Dehydration    Drug-induced constipation    Chemotherapy induced neutropenia (HCC)    Stage 3a chronic kidney disease (HCC)    Thrombocytopenia (HCC)    Neuropathy    Diastolic dysfunction    Hypokalemia    Leukemoid reaction    GOGO (acute kidney injury) (HCC)    Acute post-operative pain    At risk for constipation    At risk for delirium    Advance care planning    Physical deconditioning    History of CVA (cerebrovascular accident)    Chronic nasal congestion    Elevated LFTs       Assessment / Plan: 69-year-old female with metastatic rectal cancer and at least locally advanced Inderjit cell carcinoma of the tonsil.  For her next set of labs I am going to recheck iron studies.  She is on oral iron and was asking if she still needs to take it.  I asked her to continue until I can see where her iron levels are.  I would like to get her restarted on systemic therapy with 5-FU and nivolumab if we can use this for a maintenance type situation.  I cannot give her any more oxaliplatin due to the neuropathy it is already caused.  We will plan on restarting treatment the week of April 22.  I want a give her a little bit more time to recover from her surgery and rehab.  I am going to get a baseline PET scan right now also we have an image for comparison going forward.  She will see my nurse practitioner the first week of May because I will be out of the office on hospital rounds.  She was given Ensure samples today.  I also increased her mirtazapine dose to 45 mg from 30 mg at night to try to help her appetite.  If she does not start stabilizing her weight soon we may need to add an additional medication.       I spent 30 minutes on chart review, face to face counseling time, coordination of care and documentation.    Past Medical History:   has a past medical history of Anemia, Arthritis, Body mass index (BMI) 40.0-44.9, adult (HCC), Breast cancer (HCC) (12/17/2018), Cancer  (HCC), Cervical lymphadenopathy, Encounter for screening for HIV (07/07/2022), Follow-up examination (04/04/2023), GERD (gastroesophageal reflux disease), Hyperlipidemia, Hypertension, Obesity, Stroke (HCC), Stroke (HCC), Transaminitis (09/22/2021), and Vasomotor rhinitis.    PAST SURGICAL HISTORY:   has a past surgical history that includes US guided breast biopsy right complete (Right, 11/23/2018); Breast surgery; pr mastectomy partial w/axillary lymphadenectomy (Right, 12/20/2018); Tubal ligation; Breast biopsy (Right, 11/23/2018); US guided injection for research study (12/20/2018); US guided injection for research study (12/07/2018); US guided injection for research study (05/03/2019); US guided lymph node biopsy right (05/26/2023); IR biopsy liver mass (06/27/2023); pr laryngoscopy w/biopsy microscope/telescope (N/A, 07/20/2023); pr [facility] incl fluor gdnce dx w/cell washg spx (N/A, 07/20/2023); ESOPHAGOSCOPY (N/A, 07/20/2023); IR port placement (07/28/2023); IR port check (01/03/2024); IR port stripping (01/09/2024); Colonoscopy; Hemicoloectomy w/ anastomosis (Right, 3/12/2024); and LAPAROTOMY (N/A, 3/12/2024).    CURRENT MEDICATIONS  Current Outpatient Medications   Medication Sig Dispense Refill    anastrozole (ARIMIDEX) 1 mg tablet Take 1 tablet (1 mg total) by mouth daily 90 tablet 3    atorvastatin (LIPITOR) 40 mg tablet Take 1 tablet (40 mg total) by mouth daily at bedtime 30 tablet 2    Cyanocobalamin (B-12 PO) Take by mouth      docusate sodium (COLACE) 50 mg capsule Take 1 capsule (50 mg total) by mouth 2 (two) times a day 90 capsule 1    ergocalciferol (VITAMIN D2) 50,000 units Take 1 capsule (50,000 Units total) by mouth once a week 90 capsule 3    ferrous sulfate 325 (65 Fe) mg tablet Take 1 tablet (325 mg total) by mouth daily with breakfast 30 tablet 0    folic acid (FOLVITE) 1 mg tablet Take 1 tablet (1 mg total) by mouth in the morning 90 tablet 3    gabapentin (NEURONTIN) 300 mg capsule Take  "1 capsule (300 mg total) by mouth 3 (three) times a day for 7 days 21 capsule 0    hydrocortisone 1 % ointment       lidocaine-prilocaine (EMLA) cream Apply topically as needed for mild pain 30 g 3    losartan (COZAAR) 50 mg tablet Take 1 tablet (50 mg total) by mouth 2 (two) times a day 60 tablet 0    mirtazapine (REMERON) 15 mg tablet Take 1 tablet (15 mg total) by mouth daily at bedtime Patient is also on a 30 mg tablet, for a total dose of 45 mg 30 tablet 3    mirtazapine (REMERON) 30 mg tablet Take 1 tablet (30 mg total) by mouth daily at bedtime 30 tablet 3    omeprazole (PriLOSEC) 20 mg delayed release capsule Take 1 capsule (20 mg total) by mouth daily 30 capsule 5    ondansetron (ZOFRAN) 8 mg tablet Take 1 tablet (8 mg total) by mouth every 8 (eight) hours as needed for nausea or vomiting 30 tablet 3    potassium chloride (K-DUR,KLOR-CON) 20 mEq tablet Take 1 tablet (20 mEq total) by mouth 2 (two) times a day 60 tablet 1    prochlorperazine (COMPAZINE) 10 mg tablet Take 1 tablet (10 mg total) by mouth every 6 (six) hours as needed for nausea or vomiting 30 tablet 3    pyridoxine (VITAMIN B6) 50 mg tablet Take 1 tablet (50 mg total) by mouth daily 90 tablet 0     No current facility-administered medications for this visit.     [unfilled]    SOCIAL HISTORY:   reports that she has never smoked. She has never been exposed to tobacco smoke. She has never used smokeless tobacco. She reports that she does not drink alcohol and does not use drugs.     FAMILY HISTORY:  family history includes Colon cancer (age of onset: 58) in her mother; Hypertension in her daughter, mother, and son; Pancreatic cancer (age of onset: 60) in her father.     ALLERGIES:  has No Known Allergies.      Physical Exam:  Vital Signs:   Visit Vitals  /98 (BP Location: Left arm, Patient Position: Sitting, Cuff Size: Adult)   Pulse (!) 115   Temp 97.5 °F (36.4 °C)   Resp 17   Ht 5' 5\" (1.651 m)   Wt 88 kg (194 lb)   SpO2 98%   BMI 32.28 " kg/m²   OB Status Postmenopausal   Smoking Status Never   BSA 1.95 m²     Body mass index is 32.28 kg/m².  Body surface area is 1.95 meters squared.    GEN: Alert, awake oriented x3, in no acute distress  HEENT- No pallor, icterus, cyanosis, no oral mucosal lesions,   LAD - right sided cervical LAD 3 x 2.5 cm  Heart- normal S1 S2, regular rate and rhythm, No murmur, rubs.   Lungs- clear breathing sound bilateral.   Abdomen- soft, Non tender, bowel sounds present  Extremities- No cyanosis, clubbing, edema  Neuro- No focal neurological deficit    Labs:  Lab Results   Component Value Date    WBC 5.67 04/01/2024    HGB 7.8 (L) 04/01/2024    HCT 24.5 (L) 04/01/2024    MCV 94 04/01/2024     04/01/2024     Lab Results   Component Value Date    SODIUM 138 04/01/2024    K 3.9 04/01/2024     04/01/2024    CO2 24 04/01/2024    AGAP 9 04/01/2024    BUN 11 04/01/2024    CREATININE 0.58 (L) 04/01/2024    GLUC 86 04/01/2024    GLUF 95 01/06/2024    CALCIUM 9.0 04/01/2024    AST 69 (H) 03/25/2024    ALT 32 03/25/2024    ALKPHOS 143 (H) 03/25/2024    TP 6.5 03/25/2024    TBILI 0.31 03/25/2024    EGFR 95 04/01/2024

## 2024-04-15 ENCOUNTER — DOCUMENTATION (OUTPATIENT)
Dept: HEMATOLOGY ONCOLOGY | Facility: CLINIC | Age: 69
End: 2024-04-15

## 2024-04-15 NOTE — PROGRESS NOTES
PT has been on med holiday, medication has been resumed.  PT is on Opdivo. There is no funding or assistance available.   Outreached PT to discuss financial assistance opportunities. PT did not answer. Voicemail was left detailing the reason for the call, this writer's contact information, and a high-level overview of options.     Shira Holcomb MPH  Phone:[phone]  Email: [email or web address]

## 2024-04-16 ENCOUNTER — TELEPHONE (OUTPATIENT)
Dept: HEMATOLOGY ONCOLOGY | Facility: CLINIC | Age: 69
End: 2024-04-16

## 2024-04-16 ENCOUNTER — PATIENT OUTREACH (OUTPATIENT)
Dept: HEMATOLOGY ONCOLOGY | Facility: CLINIC | Age: 69
End: 2024-04-16

## 2024-04-16 DIAGNOSIS — C18.9 METASTATIC COLON CANCER TO LIVER (HCC): Primary | ICD-10-CM

## 2024-04-16 DIAGNOSIS — C78.7 METASTATIC COLON CANCER TO LIVER (HCC): Primary | ICD-10-CM

## 2024-04-16 NOTE — TELEPHONE ENCOUNTER
Spoke with Sade at PET/CT. No sooner appts are available. I also informed her that no auth is required for patient's secondary. No sooner appts are available and she is on a cancellation list.

## 2024-04-16 NOTE — TELEPHONE ENCOUNTER
Patient Call    Who are you speaking with? [facility]     If it is not the patient, are they listed on an active communication consent form? na   What is the reason for this call? Sade calling to inform Dr. Medina the patient has a scheduled appt for petscan 5/7/24 @ 12 .  She also has an infusion 5/7/24 @ 12 in [place].  Sade would like the office to reschedule infusion    Does this require a call back? yes   If a call back is required, please list best call back number [phone]   If a call back is required, advise that a message will be forwarded to their care team and someone will return their call as soon as possible.   Did you relay this information to the patient? yes

## 2024-04-16 NOTE — TELEPHONE ENCOUNTER
Spoke with Sade to schedule patient's PET CT sooner. Sade explained that her secondary insurance does require auth and this has not been obtained yet. I informed her that I will notify our finance dept and will callback once auth is obtained. Sade verbalized understanding.

## 2024-04-16 NOTE — PROGRESS NOTES
Pt called stating she is scheduled for a PET CT 4/18/2024 and needs transportation set up   E mail sent to [facility] for transportation to this appointment   E mail received from [place], pt is scheduled for [facility]  for her PET CT appointment

## 2024-04-17 DIAGNOSIS — C18.9 METASTATIC COLON CANCER TO LIVER (HCC): Primary | ICD-10-CM

## 2024-04-17 DIAGNOSIS — C78.7 METASTATIC COLON CANCER TO LIVER (HCC): Primary | ICD-10-CM

## 2024-04-17 RX ORDER — FLUOROURACIL 50 MG/ML
320 INJECTION, SOLUTION INTRAVENOUS ONCE
Status: CANCELLED | OUTPATIENT
Start: 2024-04-23

## 2024-04-17 RX ORDER — FLUOROURACIL 50 MG/ML
320 INJECTION, SOLUTION INTRAVENOUS ONCE
OUTPATIENT
Start: 2024-05-07

## 2024-04-18 ENCOUNTER — PATIENT OUTREACH (OUTPATIENT)
Dept: HEMATOLOGY ONCOLOGY | Facility: CLINIC | Age: 69
End: 2024-04-18

## 2024-04-18 ENCOUNTER — HOSPITAL ENCOUNTER (OUTPATIENT)
Dept: RADIOLOGY | Age: 69
Discharge: HOME | End: 2024-04-18
Payer: MEDICARE

## 2024-04-18 DIAGNOSIS — C78.7 METASTATIC COLON CANCER TO LIVER (HCC): ICD-10-CM

## 2024-04-18 DIAGNOSIS — C78.7 METASTATIC COLON CANCER TO LIVER (HCC): Primary | ICD-10-CM

## 2024-04-18 DIAGNOSIS — C18.9 METASTATIC COLON CANCER TO LIVER (HCC): Primary | ICD-10-CM

## 2024-04-18 DIAGNOSIS — C18.9 METASTATIC COLON CANCER TO LIVER (HCC): ICD-10-CM

## 2024-04-18 LAB — GLUCOSE SERPL-MCNC: 101 MG/DL (ref 65–140)

## 2024-04-18 PROCEDURE — A9552 F18 FDG: HCPCS

## 2024-04-18 PROCEDURE — 78815 PET IMAGE W/CT SKULL-THIGH: CPT

## 2024-04-18 PROCEDURE — 82948 REAGENT STRIP/BLOOD GLUCOSE: CPT

## 2024-04-18 NOTE — LETTER
[facility]  [address] [address]      April 23, 2024    MRN: [id number]     Phone: [phone]     Dear Ms. Moura,    You recently had a(n) Nuclear Medicine performed on 4/18/2024 at  [facility] that was requested by Harika Medina DO. The study was reviewed by a radiologist, which is a physician who specializes in medical imaging. The radiologist issued a report describing his or her findings. In that report there was a finding that the radiologist felt warranted further discussion with your health care provider and that discussion would be beneficial to you.      The results were sent to Harika Medina DO on 04/18/2024 11:34 AM. We recommend that you contact Harika Medina DO at [phone] or set up an appointment to discuss the results of the imaging test. If you have already heard from Harika Medina DO regarding the results of your study, you can disregard this letter.     This letter is not meant to alarm you, but intended to encourage you to follow-up on your results with the provider that sent you for the imaging study. In addition, we have enclosed answers to frequently asked questions by other patients who have also received a letter to review results with their health care provider (see page two).      Thank you for choosing [facility] for your medical imaging needs.                                                                                                                                                        FREQUENTLY ASKED QUESTIONS    Why am I receiving this letter?  [place] State Law requires us to notify patients who have findings on imaging exams that may require more testing or follow-up with a health professional within the next 3 months.        How serious is the finding on the imaging test?  This letter is sent to all patients who may need follow-up or more testing within the next  3 months.  Receiving this letter does not necessarily mean you have a life-threatening imaging finding or disease.  Recommendations in the radiologist’s imaging report are general in nature and it is up to your healthcare provider to say whether those recommendations make sense for your situation.  You are strongly encouraged to talk to your health care provider about the results and ask whether additional steps need to be taken.    Where can I get a copy of the final report for my recent radiology exam?  To get a full copy of the report you can access your records online at [email or web address] or please contact [facility]’s Medical Records Department at [phone] Monday through Friday between 8 am and 6 pm.         What do I need to do now?           Please contact your health care provider who requested the imaging study to discuss what further actions (if any) are needed.  You may have already heard from (your ordering provider) in regard to this test in which case you can disregard this letter.        NOTICE IN ACCORDANCE WITH THE [place] STATE “PATIENT TEST RESULT INFORMATION ACT OF 2018”    You are receiving this notice as a result of a determination by your diagnostic imaging service that further discussions of your test results are warranted and would be beneficial to you.    The complete results of your test or tests have been or will be sent to the health care practitioner that ordered the test or tests. It is recommended that you contact your health care practitioner to discuss your results as soon as possible.

## 2024-04-18 NOTE — PROGRESS NOTES
Pt called stating [facility]  sent the confirmation text messages for her  to her sons phone, not hers. She stated she had to schedule her own  LYFT.  Spoke with [place] at [place], confirmed the pts main contact phone number. We also reviewed the pts schedule, and have her scheduled for transportation to all her upcoming appts

## 2024-04-19 ENCOUNTER — PATIENT OUTREACH (OUTPATIENT)
Dept: HEMATOLOGY ONCOLOGY | Facility: CLINIC | Age: 69
End: 2024-04-19

## 2024-04-19 NOTE — PROGRESS NOTES
Pt called to confirm she is scheduled for  LYFT  for her MRI tomorrow 4/20/2024   E mail sent to  [place]  for confirmation

## 2024-04-20 ENCOUNTER — HOSPITAL ENCOUNTER (OUTPATIENT)
Dept: RADIOLOGY | Facility: HOSPITAL | Age: 69
Discharge: HOME | End: 2024-04-20
Attending: STUDENT IN AN ORGANIZED HEALTH CARE EDUCATION/TRAINING PROGRAM
Payer: MEDICARE

## 2024-04-20 DIAGNOSIS — C18.9 METASTATIC COLON CANCER TO LIVER (HCC): ICD-10-CM

## 2024-04-20 DIAGNOSIS — C78.7 METASTATIC COLON CANCER TO LIVER (HCC): ICD-10-CM

## 2024-04-20 PROCEDURE — 74183 MRI ABD W/O CNTR FLWD CNTR: CPT

## 2024-04-20 PROCEDURE — A9585 GADOBUTROL INJECTION: HCPCS | Performed by: STUDENT IN AN ORGANIZED HEALTH CARE EDUCATION/TRAINING PROGRAM

## 2024-04-20 RX ORDER — GADOBUTROL 604.72 MG/ML
8 INJECTION INTRAVENOUS
Status: COMPLETED | OUTPATIENT
Start: 2024-04-20 | End: 2024-04-20

## 2024-04-20 RX ADMIN — GADOBUTROL 8 ML: 604.72 INJECTION INTRAVENOUS at 17:07

## 2024-04-22 ENCOUNTER — HOSPITAL ENCOUNTER (OUTPATIENT)
Dept: INFUSION CENTER | Facility: CLINIC | Age: 69
Discharge: HOME | End: 2024-04-22
Payer: MEDICARE

## 2024-04-22 DIAGNOSIS — C78.7 METASTATIC COLON CANCER TO LIVER (HCC): Primary | ICD-10-CM

## 2024-04-22 DIAGNOSIS — C18.9 METASTATIC COLON CANCER TO LIVER (HCC): Primary | ICD-10-CM

## 2024-04-22 LAB
ALBUMIN SERPL BCP-MCNC: 3.4 G/DL (ref 3.5–5)
ALP SERPL-CCNC: 90 U/L (ref 34–104)
ALT SERPL W P-5'-P-CCNC: 23 U/L (ref 7–52)
ANION GAP SERPL CALCULATED.3IONS-SCNC: 6 MMOL/L (ref 4–13)
AST SERPL W P-5'-P-CCNC: 41 U/L (ref 13–39)
BASOPHILS # BLD AUTO: 0.03 THOUSANDS/ÂΜL (ref 0–0.1)
BASOPHILS NFR BLD AUTO: 1 % (ref 0–1)
BILIRUB SERPL-MCNC: 0.36 MG/DL (ref 0.2–1)
BUN SERPL-MCNC: 12 MG/DL (ref 5–25)
CALCIUM ALBUM COR SERPL-MCNC: 9.9 MG/DL (ref 8.3–10.1)
CALCIUM SERPL-MCNC: 9.4 MG/DL (ref 8.4–10.2)
CHLORIDE SERPL-SCNC: 106 MMOL/L (ref 96–108)
CO2 SERPL-SCNC: 27 MMOL/L (ref 21–32)
CREAT SERPL-MCNC: 0.74 MG/DL (ref 0.6–1.3)
EOSINOPHIL # BLD AUTO: 0.13 THOUSAND/ÂΜL (ref 0–0.61)
EOSINOPHIL NFR BLD AUTO: 2 % (ref 0–6)
ERYTHROCYTE [DISTWIDTH] IN BLOOD BY AUTOMATED COUNT: 15.9 % (ref 11.6–15.1)
GFR SERPL CREATININE-BSD FRML MDRD: 83 ML/MIN/1.73SQ M
GLUCOSE SERPL-MCNC: 116 MG/DL (ref 65–140)
HCT VFR BLD AUTO: 26 % (ref 34.8–46.1)
HGB BLD-MCNC: 8.1 G/DL (ref 11.5–15.4)
IMM GRANULOCYTES # BLD AUTO: 0.02 THOUSAND/UL (ref 0–0.2)
IMM GRANULOCYTES NFR BLD AUTO: 0 % (ref 0–2)
LYMPHOCYTES # BLD AUTO: 1.33 THOUSANDS/ÂΜL (ref 0.6–4.47)
LYMPHOCYTES NFR BLD AUTO: 21 % (ref 14–44)
MCH RBC QN AUTO: 28.2 PG (ref 26.8–34.3)
MCHC RBC AUTO-ENTMCNC: 31.2 G/DL (ref 31.4–37.4)
MCV RBC AUTO: 91 FL (ref 82–98)
MONOCYTES # BLD AUTO: 0.47 THOUSAND/ÂΜL (ref 0.17–1.22)
MONOCYTES NFR BLD AUTO: 8 % (ref 4–12)
NEUTROPHILS # BLD AUTO: 4.28 THOUSANDS/ÂΜL (ref 1.85–7.62)
NEUTS SEG NFR BLD AUTO: 68 % (ref 43–75)
NRBC BLD AUTO-RTO: 0 /100 WBCS
PLATELET # BLD AUTO: 300 THOUSANDS/UL (ref 149–390)
PMV BLD AUTO: 10.1 FL (ref 8.9–12.7)
POTASSIUM SERPL-SCNC: 3.4 MMOL/L (ref 3.5–5.3)
PROT SERPL-MCNC: 7.7 G/DL (ref 6.4–8.4)
RBC # BLD AUTO: 2.87 MILLION/UL (ref 3.81–5.12)
SODIUM SERPL-SCNC: 139 MMOL/L (ref 135–147)
TSH SERPL DL<=0.05 MIU/L-ACNC: 2.13 UIU/ML (ref 0.45–4.5)
WBC # BLD AUTO: 6.26 THOUSAND/UL (ref 4.31–10.16)

## 2024-04-22 PROCEDURE — 84443 ASSAY THYROID STIM HORMONE: CPT | Performed by: INTERNAL MEDICINE

## 2024-04-22 PROCEDURE — 80053 COMPREHEN METABOLIC PANEL: CPT | Performed by: INTERNAL MEDICINE

## 2024-04-22 PROCEDURE — 85025 COMPLETE CBC W/AUTO DIFF WBC: CPT | Performed by: INTERNAL MEDICINE

## 2024-04-22 PROCEDURE — 36593 DECLOT VASCULAR DEVICE: CPT

## 2024-04-22 RX ADMIN — ALTEPLASE 2 MG: 2.2 INJECTION, POWDER, LYOPHILIZED, FOR SOLUTION INTRAVENOUS at 12:09

## 2024-04-22 NOTE — PROGRESS NOTES
Initially patient did not have blood return from port but after about an hour of cath arlene dwell time patient had good blood return from port. Labs drawn and sent. Flushed with saline per protocol. AVS declined, aware of next appointment tomorrow @ 12:00, left unit in stable condition.

## 2024-04-23 ENCOUNTER — HOSPITAL ENCOUNTER (OUTPATIENT)
Dept: INFUSION CENTER | Facility: CLINIC | Age: 69
Discharge: HOME | End: 2024-04-23
Payer: MEDICARE

## 2024-04-23 VITALS
DIASTOLIC BLOOD PRESSURE: 76 MMHG | HEART RATE: 114 BPM | HEIGHT: 65 IN | SYSTOLIC BLOOD PRESSURE: 112 MMHG | TEMPERATURE: 97.5 F | WEIGHT: 195.66 LBS | RESPIRATION RATE: 18 BRPM | BODY MASS INDEX: 32.6 KG/M2

## 2024-04-23 DIAGNOSIS — C78.7 METASTATIC COLON CANCER TO LIVER (HCC): Primary | ICD-10-CM

## 2024-04-23 DIAGNOSIS — C18.9 METASTATIC COLON CANCER TO LIVER (HCC): Primary | ICD-10-CM

## 2024-04-23 PROCEDURE — G0498 CHEMO EXTEND IV INFUS W/PUMP: HCPCS

## 2024-04-23 PROCEDURE — 96413 CHEMO IV INFUSION 1 HR: CPT

## 2024-04-23 PROCEDURE — 96411 CHEMO IV PUSH ADDL DRUG: CPT

## 2024-04-23 PROCEDURE — 96367 TX/PROPH/DG ADDL SEQ IV INF: CPT

## 2024-04-23 RX ORDER — SODIUM CHLORIDE 9 MG/ML
20 INJECTION, SOLUTION INTRAVENOUS ONCE AS NEEDED
Status: DISCONTINUED | OUTPATIENT
Start: 2024-04-23 | End: 2024-04-26 | Stop reason: HOSPADM

## 2024-04-23 RX ORDER — FLUOROURACIL 50 MG/ML
320 INJECTION, SOLUTION INTRAVENOUS ONCE
Status: COMPLETED | OUTPATIENT
Start: 2024-04-23 | End: 2024-04-23

## 2024-04-23 RX ORDER — DEXTROSE MONOHYDRATE 50 MG/ML
20 INJECTION, SOLUTION INTRAVENOUS ONCE
Status: DISCONTINUED | OUTPATIENT
Start: 2024-04-23 | End: 2024-04-26 | Stop reason: HOSPADM

## 2024-04-23 RX ADMIN — LEUCOVORIN CALCIUM 800 MG: 500 INJECTION, POWDER, LYOPHILIZED, FOR SOLUTION INTRAMUSCULAR; INTRAVENOUS at 13:57

## 2024-04-23 RX ADMIN — DEXAMETHASONE SODIUM PHOSPHATE: 100 INJECTION INTRAMUSCULAR; INTRAVENOUS at 12:40

## 2024-04-23 RX ADMIN — FLUOROURACIL 625 MG: 50 INJECTION, SOLUTION INTRAVENOUS at 14:31

## 2024-04-23 RX ADMIN — SODIUM CHLORIDE 20 ML/HR: 0.9 INJECTION, SOLUTION INTRAVENOUS at 12:40

## 2024-04-23 RX ADMIN — SODIUM CHLORIDE 240 MG: 9 INJECTION, SOLUTION INTRAVENOUS at 13:14

## 2024-04-23 NOTE — PROGRESS NOTES
Maira Moura  tolerated treatment well with no complications.      Maira Moura is aware of future appt on 4/25/24 @ 1300.    AVS declined by Maira Moura.

## 2024-04-24 ENCOUNTER — LAB (OUTPATIENT)
Dept: LAB | Facility: HOSPITAL | Age: 69
End: 2024-04-24
Payer: MEDICARE

## 2024-04-24 DIAGNOSIS — E87.6 HYPOKALEMIA: ICD-10-CM

## 2024-04-24 DIAGNOSIS — Z79.899 HIGH RISK MEDICATION USE: ICD-10-CM

## 2024-04-24 DIAGNOSIS — E78.2 MIXED HYPERLIPIDEMIA: ICD-10-CM

## 2024-04-24 DIAGNOSIS — C18.9 METASTATIC COLON CANCER TO LIVER (HCC): ICD-10-CM

## 2024-04-24 DIAGNOSIS — C78.7 METASTATIC COLON CANCER TO LIVER (HCC): ICD-10-CM

## 2024-04-24 DIAGNOSIS — Z92.21 STATUS POST ADMINISTRATION OF CARDIOTOXIC CHEMOTHERAPY: ICD-10-CM

## 2024-04-24 LAB
ALBUMIN SERPL BCP-MCNC: 3.8 G/DL (ref 3.5–5)
ALP SERPL-CCNC: 94 U/L (ref 34–104)
ALT SERPL W P-5'-P-CCNC: 26 U/L (ref 7–52)
ANION GAP SERPL CALCULATED.3IONS-SCNC: 12 MMOL/L (ref 4–13)
AST SERPL W P-5'-P-CCNC: 40 U/L (ref 13–39)
BASOPHILS # BLD AUTO: 0.03 THOUSANDS/ÂΜL (ref 0–0.1)
BASOPHILS NFR BLD AUTO: 0 % (ref 0–1)
BILIRUB SERPL-MCNC: 0.38 MG/DL (ref 0.2–1)
BUN SERPL-MCNC: 17 MG/DL (ref 5–25)
CALCIUM SERPL-MCNC: 10.1 MG/DL (ref 8.4–10.2)
CARDIAC TROPONIN I PNL SERPL HS: 3 NG/L (ref 8–18)
CHLORIDE SERPL-SCNC: 102 MMOL/L (ref 96–108)
CHOLEST SERPL-MCNC: 167 MG/DL
CO2 SERPL-SCNC: 25 MMOL/L (ref 21–32)
CREAT SERPL-MCNC: 0.85 MG/DL (ref 0.6–1.3)
EOSINOPHIL # BLD AUTO: 0.02 THOUSAND/ÂΜL (ref 0–0.61)
EOSINOPHIL NFR BLD AUTO: 0 % (ref 0–6)
ERYTHROCYTE [DISTWIDTH] IN BLOOD BY AUTOMATED COUNT: 15.9 % (ref 11.6–15.1)
GFR SERPL CREATININE-BSD FRML MDRD: 70 ML/MIN/1.73SQ M
GLUCOSE P FAST SERPL-MCNC: 120 MG/DL (ref 65–99)
HCT VFR BLD AUTO: 27.8 % (ref 34.8–46.1)
HDLC SERPL-MCNC: 43 MG/DL
HGB BLD-MCNC: 9 G/DL (ref 11.5–15.4)
IMM GRANULOCYTES # BLD AUTO: 0.03 THOUSAND/UL (ref 0–0.2)
IMM GRANULOCYTES NFR BLD AUTO: 0 % (ref 0–2)
LDLC SERPL CALC-MCNC: 97 MG/DL (ref 0–100)
LYMPHOCYTES # BLD AUTO: 2.6 THOUSANDS/ÂΜL (ref 0.6–4.47)
LYMPHOCYTES NFR BLD AUTO: 27 % (ref 14–44)
MCH RBC QN AUTO: 28.1 PG (ref 26.8–34.3)
MCHC RBC AUTO-ENTMCNC: 32.4 G/DL (ref 31.4–37.4)
MCV RBC AUTO: 87 FL (ref 82–98)
MONOCYTES # BLD AUTO: 0.72 THOUSAND/ÂΜL (ref 0.17–1.22)
MONOCYTES NFR BLD AUTO: 7 % (ref 4–12)
NEUTROPHILS # BLD AUTO: 6.28 THOUSANDS/ÂΜL (ref 1.85–7.62)
NEUTS SEG NFR BLD AUTO: 66 % (ref 43–75)
NONHDLC SERPL-MCNC: 124 MG/DL
NRBC BLD AUTO-RTO: 0 /100 WBCS
PLATELET # BLD AUTO: 364 THOUSANDS/UL (ref 149–390)
PMV BLD AUTO: 9.9 FL (ref 8.9–12.7)
POTASSIUM SERPL-SCNC: 3.5 MMOL/L (ref 3.5–5.3)
PROT SERPL-MCNC: 8.9 G/DL (ref 6.4–8.4)
RBC # BLD AUTO: 3.2 MILLION/UL (ref 3.81–5.12)
SODIUM SERPL-SCNC: 139 MMOL/L (ref 135–147)
TRIGL SERPL-MCNC: 137 MG/DL
WBC # BLD AUTO: 9.68 THOUSAND/UL (ref 4.31–10.16)

## 2024-04-24 PROCEDURE — 85025 COMPLETE CBC W/AUTO DIFF WBC: CPT

## 2024-04-24 PROCEDURE — 84484 ASSAY OF TROPONIN QUANT: CPT

## 2024-04-24 PROCEDURE — 36415 COLL VENOUS BLD VENIPUNCTURE: CPT

## 2024-04-24 PROCEDURE — 80053 COMPREHEN METABOLIC PANEL: CPT

## 2024-04-24 PROCEDURE — 80061 LIPID PANEL: CPT

## 2024-04-24 RX ORDER — POTASSIUM CHLORIDE 20 MEQ/1
20 TABLET, EXTENDED RELEASE ORAL 2 TIMES DAILY
Qty: 60 TABLET | Refills: 1 | Status: SHIPPED | OUTPATIENT
Start: 2024-04-24

## 2024-04-24 NOTE — RESULT ENCOUNTER NOTE
Labs - 04/24/24   - repeat lipids (on statin)  -  LDL improved to 97 (from 150)  - troponin -  not elevated

## 2024-04-25 ENCOUNTER — HOSPITAL ENCOUNTER (OUTPATIENT)
Dept: INFUSION CENTER | Facility: CLINIC | Age: 69
Discharge: HOME | End: 2024-04-25
Payer: MEDICARE

## 2024-04-25 DIAGNOSIS — C78.7 METASTATIC COLON CANCER TO LIVER (HCC): Primary | ICD-10-CM

## 2024-04-25 DIAGNOSIS — C18.9 METASTATIC COLON CANCER TO LIVER (HCC): Primary | ICD-10-CM

## 2024-04-25 PROCEDURE — 96372 THER/PROPH/DIAG INJ SC/IM: CPT

## 2024-04-25 PROCEDURE — 96360 HYDRATION IV INFUSION INIT: CPT

## 2024-04-25 RX ADMIN — SODIUM CHLORIDE 1000 ML: 0.9 INJECTION, SOLUTION INTRAVENOUS at 12:47

## 2024-04-25 RX ADMIN — PEGFILGRASTIM 6 MG: 6 INJECTION SUBCUTANEOUS at 13:54

## 2024-04-25 NOTE — PROGRESS NOTES
Pt. Denied new symptoms or concerns today.  5FU Elastomeric pump completed over 46 hours as ordered. IV hydration administered as ordered and tolerated well,  Port flushed with excellent blood return.  Neulasta administered as ordered. Future appointments reviewed, next appt 5/7 12noon AVS provided

## 2024-04-26 ENCOUNTER — TELEPHONE (OUTPATIENT)
Dept: OTHER | Facility: HOSPITAL | Age: 69
End: 2024-04-26

## 2024-04-26 ENCOUNTER — DOCUMENTATION (OUTPATIENT)
Dept: HEMATOLOGY ONCOLOGY | Facility: CLINIC | Age: 69
End: 2024-04-26

## 2024-04-26 DIAGNOSIS — C18.9 METASTATIC COLON CANCER TO LIVER (HCC): Primary | ICD-10-CM

## 2024-04-26 DIAGNOSIS — C78.7 METASTATIC COLON CANCER TO LIVER (HCC): Primary | ICD-10-CM

## 2024-04-26 NOTE — PROGRESS NOTES
LIVER MULTIDISCIPLINARY CANCER CONFERENCE    DATE:  4/26/24      PRESENTING DOCTOR:  Dr. Israel      DIAGNOSIS:  Metastatic Colon Cancer to the Liver      Maira Moura was presented at the Liver Multidisciplinary Cancer Conference today.      PHYSICIAN RECOMMENDED PLAN:    - Status post hemicolectomy 3/12/24.  The recommended plan is for referral to IR for evaluation for possible ablation to liver lesions.      - Also, follow up with medical oncology for systemic therapy with 5FU and immunotherapy.  Patient has appointment with [facility] [address] for 5/6/24.     Team agreed to plan.    The final treatment plan will be left to the discretion of the patient and the treating physician.     DISCLAIMERS:  TO THE TREATING PHYSICIAN:  This conference is a meeting of clinicians from various specialty areas who evaluate and discuss patients for whom a multidisciplinary treatment approach is being considered. Please note that the above opinion was a consensus of the conference attendees and is intended only to assist in quality care of the discussed patient.  The responsibility for follow up on the input given during the conference, along with any final decisions regarding plan of care, is that of the patient and the patient's provider. Accordingly, appointments have only been recommended based on this information and have NOT been scheduled unless otherwise noted.      TO THE PATIENT:  This summary is a brief record of major aspects of your cancer treatment. You may choose to share a copy with any of your doctors or nurses. However, this is not a detailed or comprehensive record of your care.      NCCN guidelines were readily available for review at this discussion

## 2024-04-26 NOTE — TELEPHONE ENCOUNTER
Called to discussed TB discussion. As discussed in office, IR will proceed with MWA of single active liver tumor. They will call her to schedule. I will see her as scheduled.

## 2024-05-01 DIAGNOSIS — C18.9 METASTATIC COLON CANCER TO LIVER (HCC): Primary | ICD-10-CM

## 2024-05-01 DIAGNOSIS — C78.7 METASTATIC COLON CANCER TO LIVER (HCC): Primary | ICD-10-CM

## 2024-05-03 ENCOUNTER — HOSPITAL ENCOUNTER (OUTPATIENT)
Dept: INFUSION CENTER | Facility: CLINIC | Age: 69
Discharge: HOME | End: 2024-05-03
Payer: MEDICARE

## 2024-05-03 DIAGNOSIS — C18.9 METASTATIC COLON CANCER TO LIVER (HCC): Primary | ICD-10-CM

## 2024-05-03 DIAGNOSIS — C78.7 METASTATIC COLON CANCER TO LIVER (HCC): Primary | ICD-10-CM

## 2024-05-03 LAB
ALBUMIN SERPL BCP-MCNC: 3.5 G/DL (ref 3.5–5)
ALP SERPL-CCNC: 114 U/L (ref 34–104)
ALT SERPL W P-5'-P-CCNC: 33 U/L (ref 7–52)
ANION GAP SERPL CALCULATED.3IONS-SCNC: 6 MMOL/L (ref 4–13)
AST SERPL W P-5'-P-CCNC: 47 U/L (ref 13–39)
BASOPHILS # BLD AUTO: 0.06 THOUSANDS/ÂΜL (ref 0–0.1)
BASOPHILS NFR BLD AUTO: 1 % (ref 0–1)
BILIRUB SERPL-MCNC: 0.46 MG/DL (ref 0.2–1)
BUN SERPL-MCNC: 10 MG/DL (ref 5–25)
CALCIUM SERPL-MCNC: 9.7 MG/DL (ref 8.4–10.2)
CHLORIDE SERPL-SCNC: 104 MMOL/L (ref 96–108)
CO2 SERPL-SCNC: 27 MMOL/L (ref 21–32)
CREAT SERPL-MCNC: 0.73 MG/DL (ref 0.6–1.3)
EOSINOPHIL # BLD AUTO: 0.24 THOUSAND/ÂΜL (ref 0–0.61)
EOSINOPHIL NFR BLD AUTO: 3 % (ref 0–6)
ERYTHROCYTE [DISTWIDTH] IN BLOOD BY AUTOMATED COUNT: 17.5 % (ref 11.6–15.1)
GFR SERPL CREATININE-BSD FRML MDRD: 84 ML/MIN/1.73SQ M
GLUCOSE SERPL-MCNC: 125 MG/DL (ref 65–140)
HCT VFR BLD AUTO: 28.6 % (ref 34.8–46.1)
HGB BLD-MCNC: 9 G/DL (ref 11.5–15.4)
IMM GRANULOCYTES # BLD AUTO: 0.05 THOUSAND/UL (ref 0–0.2)
IMM GRANULOCYTES NFR BLD AUTO: 1 % (ref 0–2)
LYMPHOCYTES # BLD AUTO: 1.96 THOUSANDS/ÂΜL (ref 0.6–4.47)
LYMPHOCYTES NFR BLD AUTO: 21 % (ref 14–44)
MCH RBC QN AUTO: 29.2 PG (ref 26.8–34.3)
MCHC RBC AUTO-ENTMCNC: 31.5 G/DL (ref 31.4–37.4)
MCV RBC AUTO: 93 FL (ref 82–98)
MONOCYTES # BLD AUTO: 0.55 THOUSAND/ÂΜL (ref 0.17–1.22)
MONOCYTES NFR BLD AUTO: 6 % (ref 4–12)
NEUTROPHILS # BLD AUTO: 6.64 THOUSANDS/ÂΜL (ref 1.85–7.62)
NEUTS SEG NFR BLD AUTO: 68 % (ref 43–75)
NRBC BLD AUTO-RTO: 0 /100 WBCS
PLATELET # BLD AUTO: 208 THOUSANDS/UL (ref 149–390)
PMV BLD AUTO: 10.4 FL (ref 8.9–12.7)
POTASSIUM SERPL-SCNC: 3 MMOL/L (ref 3.5–5.3)
PROT SERPL-MCNC: 7.7 G/DL (ref 6.4–8.4)
RBC # BLD AUTO: 3.08 MILLION/UL (ref 3.81–5.12)
SODIUM SERPL-SCNC: 137 MMOL/L (ref 135–147)
TSH SERPL DL<=0.05 MIU/L-ACNC: 3.15 UIU/ML (ref 0.45–4.5)
WBC # BLD AUTO: 9.5 THOUSAND/UL (ref 4.31–10.16)

## 2024-05-03 PROCEDURE — 80053 COMPREHEN METABOLIC PANEL: CPT | Performed by: INTERNAL MEDICINE

## 2024-05-03 PROCEDURE — 85025 COMPLETE CBC W/AUTO DIFF WBC: CPT | Performed by: INTERNAL MEDICINE

## 2024-05-03 PROCEDURE — 84443 ASSAY THYROID STIM HORMONE: CPT | Performed by: INTERNAL MEDICINE

## 2024-05-03 NOTE — PROGRESS NOTES
Pt. Denies new symptoms or concerns today. Labs obtained as ordered via port a cath. Excellent blood return noted. Future appointments reviewed. Pt. Will return Tuesday 5/7 24 at 1200.  Pt. Declined AVS.

## 2024-05-03 NOTE — PLAN OF CARE
Problem: INFECTION - ADULT  Goal: Absence or prevention of progression during hospitalization  Description: INTERVENTIONS:  - Assess and monitor for signs and symptoms of infection  - Monitor lab/diagnostic results  - Monitor all insertion sites, i.e. indwelling lines, tubes, and drains  - Monitor endotracheal if appropriate and nasal secretions for changes in amount and color  - [place] appropriate cooling/warming therapies per order  - Administer medications as ordered  - Instruct and encourage patient and family to use good hand hygiene technique  - Identify and instruct in appropriate isolation precautions for identified infection/condition  Outcome: Progressing  Goal: Absence of fever/infection during neutropenic period  Description: INTERVENTIONS:  - Monitor WBC    Outcome: Progressing     Problem: Knowledge Deficit  Goal: Patient/family/caregiver demonstrates understanding of disease process, treatment plan, medications, and discharge instructions  Description: Complete learning assessment and assess knowledge base.  Interventions:  - Provide teaching at level of understanding  - Provide teaching via preferred learning methods  Outcome: Progressing

## 2024-05-06 ENCOUNTER — TELEMEDICINE (OUTPATIENT)
Dept: INTERVENTIONAL RADIOLOGY/VASCULAR | Facility: CLINIC | Age: 69
End: 2024-05-06
Payer: MEDICARE

## 2024-05-06 ENCOUNTER — TELEPHONE (OUTPATIENT)
Dept: HEMATOLOGY ONCOLOGY | Facility: CLINIC | Age: 69
End: 2024-05-06

## 2024-05-06 ENCOUNTER — OFFICE VISIT (OUTPATIENT)
Dept: HEMATOLOGY ONCOLOGY | Facility: CLINIC | Age: 69
End: 2024-05-06
Payer: MEDICARE

## 2024-05-06 VITALS
TEMPERATURE: 97.8 F | WEIGHT: 194.8 LBS | HEIGHT: 65 IN | BODY MASS INDEX: 32.46 KG/M2 | RESPIRATION RATE: 18 BRPM | OXYGEN SATURATION: 95 % | HEART RATE: 109 BPM | DIASTOLIC BLOOD PRESSURE: 86 MMHG | SYSTOLIC BLOOD PRESSURE: 132 MMHG

## 2024-05-06 DIAGNOSIS — D50.8 OTHER IRON DEFICIENCY ANEMIA: Primary | ICD-10-CM

## 2024-05-06 DIAGNOSIS — C18.9 METASTATIC COLON CANCER TO LIVER (HCC): ICD-10-CM

## 2024-05-06 DIAGNOSIS — D53.9 NUTRITIONAL ANEMIA: ICD-10-CM

## 2024-05-06 DIAGNOSIS — C09.9 RIGHT TONSILLAR SQUAMOUS CELL CARCINOMA (HCC): ICD-10-CM

## 2024-05-06 DIAGNOSIS — C78.7 METASTATIC COLON CANCER TO LIVER (HCC): ICD-10-CM

## 2024-05-06 PROCEDURE — 99214 OFFICE O/P EST MOD 30 MIN: CPT

## 2024-05-06 PROCEDURE — 99443 PR PHYS/QHP TELEPHONE EVALUATION 21-30 MIN: CPT | Performed by: RADIOLOGY

## 2024-05-06 NOTE — PROGRESS NOTES
TIME OUT 5/6/24 14:17    Per PEPE Koo, adding on a liter of fluids to tomorrow's treatment as well as all future day 1's.    Ella Bailey RN 14:21

## 2024-05-06 NOTE — PROGRESS NOTES
Virtual Brief Visit    This Visit is being completed by telephone. The Patient is located at Home and in the following state in which I hold an active license [place]    The patient was identified by name and date of birth. Maira Moura was informed that this is a telemedicine visit and that the visit is being conducted through Telephone.  My office door was closed. No one else was in the room.  She acknowledged consent and understanding of privacy and security of the video platform. The patient has agreed to participate and understands they can discontinue the visit at any time.    Patient is aware this is a billable service.       Assessment/Plan:    69-year-old with 3 malignancies  Breast cancer, 2018  Colon cancer, 2023  Squamous cell cancer of the tonsil 2023    Being maintained on systemic chemotherapy.  She underwent right hemicolectomy March 2024  She has metastatic disease to the liver  biopsy proven liver 6/27/23  She has undergone genetic testing    On recent PET CT april it appears there was response to the disease throughout her liver metastases except for 1 single metastatic focus    On discussion at tumor board it was decided to offer local therapy    This lesion appears in a location amenable for ablation    We discussed various types of liver directed therapy with the patient, namely intra-arterial therapies and needle ablation    I discussed that there are 2 forms of thermal ablation we use namely cold and heat.    Given the lesion adjacent to the liver capsule I believe cold based cryoablation may reduce the amount of postoperative pain and increase safety margin for adjacent structures such as bowel    We reviewed the technique for the procedure including general anesthesia.  She thankfully has No trouble with anesthesia  Has neuropathy  Can walk a block, stops due to being tired  She does small cooking  Lives with daughter and son    We reviewed her imaging in detail.  The specific liver lesion  not seen January  Getting opdivo and fluorouracil, no chemotherapy needs to be stopped for this procedure as she is not on antiangiogenic agents    No blood thinners    I discussed that we may also consider biopsy at the time of the ablation     I discussed risks including damage to the liver or adjacent structures and that there may be postoperative discomfort    She wishes to proceed and we will begin scheduling                              Problem List Items Addressed This Visit          Digestive    Metastatic colon cancer to liver (HCC)       Recent Visits  No visits were found meeting these conditions.  Showing recent visits within past 7 days and meeting all other requirements  Future Appointments  No visits were found meeting these conditions.  Showing future appointments within next 150 days and meeting all other requirements         Visit Time  Total Visit Duration: 31 minutes

## 2024-05-06 NOTE — TELEPHONE ENCOUNTER
Left  for the patient to call me back to discuss her potassium level and see if she is taking her potassium supplement. I left my direct teams number for her to callback.

## 2024-05-06 NOTE — TELEPHONE ENCOUNTER
Title: IV fluids added to treatment day 1    Date patient scheduled: 5/7/24    1 liter of normal saline added to day 1's of treatment plan    Office RN notified patient?? Yes, patient made aware in office     Is the patient scheduled within 24 hours?? If yes, follow up with verbal telephone call.  Spoke with PEPE Jaramillo at [facility]    Office RN to route to INF  pool. Infusion  pool routes to INF TECH POOL.    Infusion tech to receive message, confirm scheduled treatment duration matches ordered treatment duration or adjust accordingly, and re-link appointment request orders.    Infusion tech to notify pharmacy and finance.

## 2024-05-06 NOTE — PROGRESS NOTES
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, unresectable    2023 - FOLFOX    2023 - opdivo added to FOLFOX    2023-cycle 11-20% dose reduction of 5-FU bolus and oxaliplatin due to increased fatigue    2023 - oxaliplatin removed from treatment plan due to neuropathy - PET scan shows good disease control in all areas except colon lesion    3/2024 - right hemicolectomy - pT3N0     Malignant neoplasm of right female breast (HCC)   2018 Initial Diagnosis    Malignant neoplasm of right female breast (HCC)     2018 Biopsy    Rt Breast US BX 1100 8cmfn, 4 passes 12g Marquee:  - Invasive breast carcinoma of no special type (ductal NST/invasive ductal carcinoma).  - grade 2  - %  - SD 95%  - HER2 negative     2018 Surgery    Right partial mastectomy and SLN biopsy:  Infiltrating ductal carcinoma, Grade 2/3, felt to be between 2.0 cm and 2.5 cm in greatest dimension  - No invasive tumor is seen at inked margins, but there is a focus of DCIS seen within approximately 1mm of the inked medial margin  - no LVI  - no LCIS  - 0/2 LN's  at least Stage IIA - pT2, pN0, cM0, G2.    - Dr Coronado     2018 -  Cancer Staged    Stage IIA - pT2, pN0, cM0, G2.       2019 Genomic Testing    Oncotype DX score: 13     2019 -  Hormone Therapy    anastrozole 1 mg daily as adjuvant endocrine therapy    - Dr Daley       2019 - 4/3/2019 Radiation    Course: C1    Plan [employer] Energy  "Fractions Dose per Fraction (cGy) Dose Correction (cGy) Total Dose Delivered (cGy) Elapsed Days   R Breast 10X/6X 25 / 25 200 0 5,000 40   R BRST BOOST 16E 5 / 5 200 0 1,000 7      Treatment dates:  C1: 2/14/2019 - 4/3/2019       Metastatic colon cancer to liver (HCC)   7/6/2023 Genetic Testing    CARIS Results:   KRAS Pathogenic Variant Exon 2  p.G12D : lack of benefit of cetuximab, panitumumab Level 2   No other actionable mutation; MSI stable; TMB low   MiFOLOXAi Results: \"Decreased Benefit of FOLFOX + Bevacizumab in first line metastatic CRC.  This patient may achieve improved results by receiving an alternative to FOLFOX, such as FOLFIRI, as their initial regimen. As an adjustment to frontline FOLFOXIRI following toxicity: This patient may achieve improved results by removing the oxaliplatin portion of their regimen\".         7/11/2023 Initial Diagnosis    Metastatic colon cancer to liver (HCC)     7/13/2023 -  Cancer Staged    Staging form: Colon and Rectum, AJCC 8th Edition  - Clinical stage from 7/13/2023: cT3, cN0, pM1 - Signed by Harika Medina DO on 9/28/2023  Total positive nodes: 0       7/31/2023 -  Chemotherapy    potassium chloride, 20 mEq, Intravenous, Once, 2 of 2 cycles  Administration: 20 mEq (11/6/2023), 20 mEq (11/20/2023)  alteplase (CATHFLO), 2 mg, Intracatheter, Every 1 Minute as needed, 14 of 19 cycles  Administration: 2 mg (1/3/2024)  pegfilgrastim (NEULASTA), 6 mg, Subcutaneous, Once, 8 of 13 cycles  Administration: 6 mg (10/25/2023), 6 mg (11/8/2023), 6 mg (11/22/2023), 6 mg (12/6/2023), 6 mg (12/20/2023), 6 mg (1/12/2024), 6 mg (1/25/2024), 6 mg (4/25/2024)  fluorouracil (ADRUCIL), 895 mg, Intravenous, Once, 14 of 19 cycles  Dose modification: 320 mg/m2 (original dose 400 mg/m2, Cycle 11)  Administration: 900 mg (7/31/2023), 900 mg (8/14/2023), 900 mg (8/28/2023), 900 mg (9/11/2023), 900 mg (9/25/2023), 900 mg (10/9/2023), 900 mg (10/23/2023), 895 mg (11/6/2023), 895 mg (11/20/2023), " 895 mg (12/4/2023), 700 mg (12/18/2023), 680 mg (1/10/2024), 680 mg (1/23/2024), 625 mg (4/23/2024)  nivolumab (OPDIVO) IVPB, 240 mg (100 % of original dose 240 mg), Intravenous, Once, 6 of 11 cycles  Dose modification: 240 mg (original dose 240 mg, Cycle 9)  Administration: 240 mg (11/20/2023), 240 mg (12/4/2023), 240 mg (12/18/2023), 240 mg (1/10/2024), 240 mg (1/23/2024), 240 mg (4/23/2024)  leucovorin calcium IVPB, 896 mg, Intravenous, Once, 14 of 19 cycles  Administration: 900 mg (7/31/2023), 900 mg (8/14/2023), 900 mg (8/28/2023), 900 mg (9/11/2023), 900 mg (9/25/2023), 900 mg (10/9/2023), 900 mg (10/23/2023), 900 mg (11/6/2023), 900 mg (11/20/2023), 900 mg (12/4/2023), 900 mg (12/18/2023), 850 mg (1/10/2024), 850 mg (1/23/2024), 800 mg (4/23/2024)  oxaliplatin (ELOXATIN) chemo infusion, 85 mg/m2 = 190.4 mg, Intravenous, Once, 12 of 12 cycles  Dose modification: 68 mg/m2 (original dose 85 mg/m2, Cycle 11, Reason: Other (Must fill in a comment), Comment: increased fatigue)  Administration: 190.4 mg (7/31/2023), 190.4 mg (8/14/2023), 200 mg (8/28/2023), 200 mg (9/11/2023), 200 mg (9/25/2023), 200 mg (10/9/2023), 200 mg (10/23/2023), 200 mg (11/6/2023), 200 mg (11/20/2023), 190.4 mg (12/4/2023), 150 mg (12/18/2023), 150 mg (1/10/2024)  fluorouracil (ADRUCIL) ambulatory infusion Soln, 1,200 mg/m2/day = 5,375 mg, Intravenous, Over 46 hours, 14 of 19 cycles     9/26/2023 -  Cancer Staged    Staging form: Colon and Rectum, AJCC 8th Edition  - Pathologic: pT3, pN0, cM1 - Signed by Vickie Israel MD on 4/3/2024  Total positive nodes: 0       3/12/2024 Surgery    A. Terminal ileum, appendix, right colon (right hemicolectomy):  - Adenocarcinoma (5.2cm)  - Forty-nine (49) lymph nodes negative for carcinoma (0/49)    - Acellular mucin present in one (1) lymph node   - See staging synoptic (ypT3N0)     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HCC)   7/27/2023 Initial Diagnosis    Right tonsillar squamous cell carcinoma  ([facility])     7/27/2023 -  Cancer Staged    Staging form: Pharynx - Oropharynx, AJCC 8th Edition  - Clinical stage from 7/27/2023: Stage III (cT1, cN3, cM0, p16+) - Signed by Evan Plummer MD on 7/27/2023  Stage prefix: Initial diagnosis           Interval History: Patient presents for follow-up of her metastatic rectal cancer and locally advanced carcinoma of the tonsil.  Patient received cycle of nivolumab, 5-FU and leucovorin on 4/23/2024.  Patient endorses fatigue.  She continues to have constipation, has a bowel movement every 3 to 4 days.  She was taking Dulcolax but stopped and now takes Colace, which is not helping.  She does have a cough, which has not worsened. Patient reports a dry mouth/lips.  Patient continues to have persistent neuropathy bilateral feet, hands/fingers.  She has not had any falls however, is dropping things.  She continues to take B6 vitamins.   Denies shortness of breath, CP, palpitations.  No fevers, infections, nausea or vomiting.      Dr. Medina had increased her mirtazapine from 30 mg to 45 mg.  Patient has noticed an increase in appetite and reports that her taste buds are returning.  She is eating about 4 small meals a day.  She reports she eats fish, burgers, chicken.  She is also drinking Ensure 3 times daily.  Patient admits she is not drinking enough water, some days she does not drink any water.  She has not been taking her potassium chloride 20 M EQ supplement for the past 2 weeks.    Labs:  3/20/2024: Hgb 9.0, MCV 93, WBC 9.5, ANC 6.64, platelets 208,000, potassium 3.0, no renal insufficiency noted, alk phos 114, TSH 3.1    REVIEW OF SYSTEMS:  Please note that a 14-point review of systems was performed to include Constitutional, HEENT, Respiratory, CVS, GI, , Musculoskeletal, Integumentary, Neurologic, Rheumatologic, Endocrinologic, Psychiatric, Lymphatic, and Hematologic/Oncologic systems were reviewed and are negative unless otherwise stated in HPI. Positive  and negative findings pertinent to this evaluation are incorporated into the history of present illness.      ECOG PS: 1    PROBLEM LIST:  Patient Active Problem List   Diagnosis    Primary hypertension    Mixed hyperlipidemia    Malignant neoplasm of right female breast (HCC)    Vitamin D deficiency    Prediabetes    Right thyroid nodule    GERD (gastroesophageal reflux disease)    Obesity    Metastatic colon cancer to liver (HCC)    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HCC)    Poor appetite    Nutritional anemia    Dehydration    Drug-induced constipation    Chemotherapy induced neutropenia (HCC)    Stage 3a chronic kidney disease (HCC)    Thrombocytopenia (HCC)    Neuropathy    Diastolic dysfunction    Hypokalemia    Leukemoid reaction    GOGO (acute kidney injury) (HCC)    Acute post-operative pain    At risk for constipation    At risk for delirium    Advance care planning    Physical deconditioning    History of CVA (cerebrovascular accident)    Chronic nasal congestion    Elevated LFTs       Assessment / Plan: 69-year-old female with metastatic rectal cancer and locally advanced carcinoma of the tonsil.  We will proceed with cycle 15 of nivolumab, 5-FU, leucovorin at current dose.  We will check iron studies and vitamin B12 levels, putting communication for infusion RN to draw these labs tomorrow prior to treatment.  Patient already gets hydration, 1L NSS, on disconnect day.  Since she has not drinking enough water, we will add hydration day of treatment, 1L NSS.    In regards to patient's hypokalemia, recommend she start taking her potassium chloride 20 mEq twice daily.  For her constipation, recommend she take Colace twice daily, Dulcolax at bedtime and MiraLAX daily.  We discussed that if she starts having loose stools, to stop taking Dulcolax.    Encourage patient to continue eating 4 small meals a day and Ensure 3 times daily.  Provided patient with additional Ensure samples today.  Encouraged  hydration at home as well.    Will see patient back in the office prior to cycle 16 with Dr. Medina.  Patient aware to contact us for worsening symptoms or for additional questions/concerns.            I spent 30 minutes on chart review, face to face counseling time, coordination of care and documentation.    Past Medical History:   has a past medical history of Anemia, Arthritis, Body mass index (BMI) 40.0-44.9, adult (HCC), Breast cancer (HCC) (12/17/2018), Cancer (HCC), Cervical lymphadenopathy, Encounter for screening for HIV (07/07/2022), Follow-up examination (04/04/2023), GERD (gastroesophageal reflux disease), Hyperlipidemia, Hypertension, Obesity, Stroke (HCC), Stroke (HCC), Transaminitis (09/22/2021), and Vasomotor rhinitis.    PAST SURGICAL HISTORY:   has a past surgical history that includes US guided breast biopsy right complete (Right, 11/23/2018); Breast surgery; pr mastectomy partial w/axillary lymphadenectomy (Right, 12/20/2018); Tubal ligation; Breast biopsy (Right, 11/23/2018); US guided injection for research study (12/20/2018); US guided injection for research study (12/07/2018); US guided injection for research study (05/03/2019); US guided lymph node biopsy right (05/26/2023); IR biopsy liver mass (06/27/2023); pr laryngoscopy w/biopsy microscope/telescope (N/A, 07/20/2023); pr [facility] incl fluor gdnce dx w/cell washg spx (N/A, 07/20/2023); ESOPHAGOSCOPY (N/A, 07/20/2023); IR port placement (07/28/2023); IR port check (01/03/2024); IR port stripping (01/09/2024); Colonoscopy; Hemicoloectomy w/ anastomosis (Right, 3/12/2024); and LAPAROTOMY (N/A, 3/12/2024).    CURRENT MEDICATIONS  Current Outpatient Medications   Medication Sig Dispense Refill    anastrozole (ARIMIDEX) 1 mg tablet Take 1 tablet (1 mg total) by mouth daily 90 tablet 3    atorvastatin (LIPITOR) 40 mg tablet Take 1 tablet (40 mg total) by mouth daily at bedtime 30 tablet 2    Cyanocobalamin (B-12 PO) Take 1,000 mcg by mouth       docusate sodium (COLACE) 50 mg capsule Take 1 capsule (50 mg total) by mouth 2 (two) times a day 90 capsule 1    ergocalciferol (VITAMIN D2) 50,000 units Take 1 capsule (50,000 Units total) by mouth once a week 90 capsule 3    ferrous sulfate 325 (65 Fe) mg tablet Take 1 tablet (325 mg total) by mouth daily with breakfast 30 tablet 0    [START ON 5/7/2024] fluorouracil 4,680 mg in CADD/Elastomeric Infusion Device Infuse 4,680 mg (1,200 mg/m2/day x 1.95 m2) into a catheter in a vein via infusion device over 46 hours for 2 days  Infusion planned for May 7, 2024. 1 each 0    folic acid (FOLVITE) 1 mg tablet Take 1 tablet (1 mg total) by mouth in the morning 90 tablet 3    gabapentin (NEURONTIN) 300 mg capsule Take 1 capsule (300 mg total) by mouth 3 (three) times a day for 7 days 21 capsule 0    hydrocortisone 1 % ointment       lidocaine-prilocaine (EMLA) cream Apply topically as needed for mild pain 30 g 3    losartan (COZAAR) 50 mg tablet Take 1 tablet (50 mg total) by mouth 2 (two) times a day 60 tablet 0    mirtazapine (REMERON) 15 mg tablet Take 1 tablet (15 mg total) by mouth daily at bedtime Patient is also on a 30 mg tablet, for a total dose of 45 mg 30 tablet 3    mirtazapine (REMERON) 30 mg tablet Take 1 tablet (30 mg total) by mouth daily at bedtime 30 tablet 3    omeprazole (PriLOSEC) 20 mg delayed release capsule Take 1 capsule (20 mg total) by mouth daily 30 capsule 5    ondansetron (ZOFRAN) 8 mg tablet Take 1 tablet (8 mg total) by mouth every 8 (eight) hours as needed for nausea or vomiting 30 tablet 3    potassium chloride (Klor-Con M20) 20 mEq tablet Take 1 tablet (20 mEq total) by mouth 2 (two) times a day 60 tablet 1    prochlorperazine (COMPAZINE) 10 mg tablet Take 1 tablet (10 mg total) by mouth every 6 (six) hours as needed for nausea or vomiting 30 tablet 3    pyridoxine (VITAMIN B6) 50 mg tablet Take 1 tablet (50 mg total) by mouth daily 90 tablet 0     No current facility-administered  "medications for this visit.     [unfilled]    SOCIAL HISTORY:   reports that she has never smoked. She has never been exposed to tobacco smoke. She has never used smokeless tobacco. She reports that she does not drink alcohol and does not use drugs.     FAMILY HISTORY:  family history includes Colon cancer (age of onset: 58) in her mother; Hypertension in her daughter, mother, and son; Pancreatic cancer (age of onset: 60) in her father.     ALLERGIES:  has No Known Allergies.      Physical Exam:  Vital Signs:   Visit Vitals  /86 (BP Location: Left arm, Patient Position: Sitting, Cuff Size: Large)   Pulse (!) 109   Temp 97.8 °F (36.6 °C) (Temporal)   Resp 18   Ht 5' 5\" (1.651 m)   Wt 88.4 kg (194 lb 12.8 oz)   SpO2 95%   BMI 32.42 kg/m²   OB Status Postmenopausal   Smoking Status Never   BSA 1.96 m²     Body mass index is 32.42 kg/m².  Body surface area is 1.96 meters squared.    GEN: Alert, awake oriented x3, in no acute distress  HEENT- No pallor, icterus, cyanosis, no oral mucosal lesions,   LAD - no palpable cervical, clavicle, axillary, inguinal LAD  Heart- normal S1 S2, regular rate and rhythm, No murmur, rubs.   Lungs- clear breathing sound bilateral.   Abdomen- soft, Non tender, bowel sounds present  Extremities- No cyanosis, clubbing, edema  Neuro- No focal neurological deficit    Labs:  Lab Results   Component Value Date    WBC 9.50 05/03/2024    HGB 9.0 (L) 05/03/2024    HCT 28.6 (L) 05/03/2024    MCV 93 05/03/2024     05/03/2024     Lab Results   Component Value Date    SODIUM 137 05/03/2024    K 3.0 (L) 05/03/2024     05/03/2024    CO2 27 05/03/2024    AGAP 6 05/03/2024    BUN 10 05/03/2024    CREATININE 0.73 05/03/2024    GLUC 125 05/03/2024    GLUF 120 (H) 04/24/2024    CALCIUM 9.7 05/03/2024    AST 47 (H) 05/03/2024    ALT 33 05/03/2024    ALKPHOS 114 (H) 05/03/2024    TP 7.7 05/03/2024    TBILI 0.46 05/03/2024    EGFR 84 05/03/2024     "

## 2024-05-06 NOTE — LETTER
May 8, 2024     Vickie Israel MD  [address]  [address]  [place] [address]    Patient: Maira Moura   YOB: 1955   Date of Visit: 5/6/2024       Dear Dr. Israel:    Thank you for referring Maira Moura to me for evaluation. Below are my notes for this consultation.    Based on the location of this lesion and proximity to the liver capsule I will suggest cryoablation rather than microwave but we should be able to cover the lesion    I will also consider biopsy If possible      If you have questions, please do not hesitate to call me. I look forward to following your patient along with you.         Sincerely,        Rosi Hutchins MD        CC: DO Rosi Steve MD  5/8/2024  9:00 AM  Sign when Signing Visit  Virtual Brief Visit    This Visit is being completed by telephone. The Patient is located at Home and in the following state in which I hold an active license [place]    The patient was identified by name and date of birth. Maira Moura was informed that this is a telemedicine visit and that the visit is being conducted through Telephone.  My office door was closed. No one else was in the room.  She acknowledged consent and understanding of privacy and security of the video platform. The patient has agreed to participate and understands they can discontinue the visit at any time.    Patient is aware this is a billable service.       Assessment/Plan:    69-year-old with 3 malignancies  Breast cancer, 2018  Colon cancer, 2023  Squamous cell cancer of the tonsil 2023    Being maintained on systemic chemotherapy.  She underwent right hemicolectomy March 2024  She has metastatic disease to the liver  biopsy proven liver 6/27/23  She has undergone genetic testing    On recent PET CT april it appears there was response to the disease throughout her liver metastases except for 1 single metastatic focus    On discussion at tumor board it was decided to offer local therapy    This  lesion appears in a location amenable for ablation    We discussed various types of liver directed therapy with the patient, namely intra-arterial therapies and needle ablation    I discussed that there are 2 forms of thermal ablation we use namely cold and heat.    Given the lesion adjacent to the liver capsule I believe cold based cryoablation may reduce the amount of postoperative pain and increase safety margin for adjacent structures such as bowel    We reviewed the technique for the procedure including general anesthesia.  She thankfully has No trouble with anesthesia  Has neuropathy  Can walk a block, stops due to being tired  She does small cooking  Lives with daughter and son    We reviewed her imaging in detail.  The specific liver lesion not seen January  Getting opdivo and fluorouracil, no chemotherapy needs to be stopped for this procedure as she is not on antiangiogenic agents    No blood thinners    I discussed that we may also consider biopsy at the time of the ablation     I discussed risks including damage to the liver or adjacent structures and that there may be postoperative discomfort    She wishes to proceed and we will begin scheduling                              Problem List Items Addressed This Visit          Digestive    Metastatic colon cancer to liver (HCC)       Recent Visits  No visits were found meeting these conditions.  Showing recent visits within past 7 days and meeting all other requirements  Future Appointments  No visits were found meeting these conditions.  Showing future appointments within next 150 days and meeting all other requirements         Visit Time  Total Visit Duration: 31 minutes

## 2024-05-07 ENCOUNTER — HOSPITAL ENCOUNTER (OUTPATIENT)
Dept: INFUSION CENTER | Facility: CLINIC | Age: 69
Discharge: HOME | End: 2024-05-07
Payer: MEDICARE

## 2024-05-07 VITALS — BODY MASS INDEX: 32.32 KG/M2 | HEIGHT: 65 IN | WEIGHT: 194 LBS

## 2024-05-07 DIAGNOSIS — C78.7 METASTATIC COLON CANCER TO LIVER (HCC): ICD-10-CM

## 2024-05-07 DIAGNOSIS — C18.9 METASTATIC COLON CANCER TO LIVER (HCC): ICD-10-CM

## 2024-05-07 DIAGNOSIS — D50.8 OTHER IRON DEFICIENCY ANEMIA: ICD-10-CM

## 2024-05-07 DIAGNOSIS — D53.9 NUTRITIONAL ANEMIA: Primary | ICD-10-CM

## 2024-05-07 PROCEDURE — 82607 VITAMIN B-12: CPT

## 2024-05-07 PROCEDURE — 96413 CHEMO IV INFUSION 1 HR: CPT

## 2024-05-07 PROCEDURE — 83540 ASSAY OF IRON: CPT

## 2024-05-07 PROCEDURE — G0498 CHEMO EXTEND IV INFUS W/PUMP: HCPCS

## 2024-05-07 PROCEDURE — 82728 ASSAY OF FERRITIN: CPT

## 2024-05-07 PROCEDURE — 96367 TX/PROPH/DG ADDL SEQ IV INF: CPT

## 2024-05-07 PROCEDURE — 83550 IRON BINDING TEST: CPT

## 2024-05-07 PROCEDURE — 96411 CHEMO IV PUSH ADDL DRUG: CPT

## 2024-05-07 RX ORDER — SODIUM CHLORIDE 9 MG/ML
20 INJECTION, SOLUTION INTRAVENOUS ONCE AS NEEDED
Status: DISCONTINUED | OUTPATIENT
Start: 2024-05-07 | End: 2024-05-10 | Stop reason: HOSPADM

## 2024-05-07 RX ORDER — DEXTROSE MONOHYDRATE 50 MG/ML
20 INJECTION, SOLUTION INTRAVENOUS ONCE
Status: DISCONTINUED | OUTPATIENT
Start: 2024-05-07 | End: 2024-05-10 | Stop reason: HOSPADM

## 2024-05-07 RX ORDER — FLUOROURACIL 50 MG/ML
320 INJECTION, SOLUTION INTRAVENOUS ONCE
Status: COMPLETED | OUTPATIENT
Start: 2024-05-07 | End: 2024-05-07

## 2024-05-07 RX ADMIN — FLUOROURACIL 625 MG: 50 INJECTION, SOLUTION INTRAVENOUS at 14:36

## 2024-05-07 RX ADMIN — SODIUM CHLORIDE 1000 ML: 0.9 INJECTION, SOLUTION INTRAVENOUS at 12:41

## 2024-05-07 RX ADMIN — SODIUM CHLORIDE 240 MG: 9 INJECTION, SOLUTION INTRAVENOUS at 13:16

## 2024-05-07 RX ADMIN — LEUCOVORIN CALCIUM 800 MG: 500 INJECTION, POWDER, LYOPHILIZED, FOR SOLUTION INTRAMUSCULAR; INTRAVENOUS at 14:01

## 2024-05-07 RX ADMIN — SODIUM CHLORIDE 20 ML/HR: 0.9 INJECTION, SOLUTION INTRAVENOUS at 12:48

## 2024-05-07 RX ADMIN — DEXAMETHASONE SODIUM PHOSPHATE: 100 INJECTION INTRAMUSCULAR; INTRAVENOUS at 12:48

## 2024-05-07 NOTE — PROGRESS NOTES
Maira Moura  tolerated treatment well with no complications. Pt confirms she is taking oral potassium as prescribed.      Maira Moura is aware of future appt on 5/9/24 @ 1300.     AVS declined by Maira Moura.

## 2024-05-08 ENCOUNTER — TELEPHONE (OUTPATIENT)
Dept: HEMATOLOGY ONCOLOGY | Facility: CLINIC | Age: 69
End: 2024-05-08

## 2024-05-08 DIAGNOSIS — E53.8 VITAMIN B12 DEFICIENCY: Primary | ICD-10-CM

## 2024-05-08 LAB
FERRITIN SERPL-MCNC: 145 NG/ML (ref 11–307)
IRON SATN MFR SERPL: 19 % (ref 15–50)
IRON SERPL-MCNC: 55 UG/DL (ref 50–212)
TIBC SERPL-MCNC: 292 UG/DL (ref 250–450)
UIBC SERPL-MCNC: 237 UG/DL (ref 155–355)
VIT B12 SERPL-MCNC: 178 PG/ML (ref 180–914)

## 2024-05-08 RX ORDER — MAGNESIUM 200 MG
1000 TABLET ORAL DAILY
Qty: 90 TABLET | Refills: 1 | Status: SHIPPED | OUTPATIENT
Start: 2024-05-08

## 2024-05-08 RX ORDER — CYANOCOBALAMIN 1000 UG/ML
1000 INJECTION, SOLUTION INTRAMUSCULAR; SUBCUTANEOUS
Status: CANCELLED
Start: 2024-05-09

## 2024-05-08 RX ORDER — SODIUM CHLORIDE 9 MG/ML
30 INJECTION, SOLUTION INTRAVENOUS CONTINUOUS
OUTPATIENT
Start: 2024-05-08

## 2024-05-08 NOTE — TELEPHONE ENCOUNTER
Spoke with patient and informed her that er iron studies were normal and her B12 was low. I informed the patient to start sublingual tablets that were sent this morning to her pharmacy and let her know that she will also be receiving a total of 5 B12 shots on her disconnect day at infusion. Patient verbalized understanding.

## 2024-05-08 NOTE — TELEPHONE ENCOUNTER
Title: B12 injection added to day 3's for 5 cycles       Date patient scheduled: 5/9        Office RN notified patient?? Yes via telephone call    Is the patient scheduled within 24 hours?? If yes, follow up with verbal telephone call.  NICHOLAS left at [facility]    Office RN to route to INF  pool. Infusion  pool routes to INF TECH POOL.    Infusion tech to receive message, confirm scheduled treatment duration matches ordered treatment duration or adjust accordingly, and re-link appointment request orders.    Infusion tech to notify pharmacy and finance.

## 2024-05-08 NOTE — PROGRESS NOTES
Time out received via inbasket from Shawanda Diehl RN:    B12 injection added to day 3 scheduled for 5/9/24

## 2024-05-08 NOTE — TELEPHONE ENCOUNTER
Left detailed VM for the patient to let her know that her follow-up appt is before her treatment on 5/21 and I scheduled transport for her. I left my direct teams number for her to callback.

## 2024-05-09 ENCOUNTER — HOSPITAL ENCOUNTER (OUTPATIENT)
Dept: INFUSION CENTER | Facility: CLINIC | Age: 69
Discharge: HOME | End: 2024-05-09
Payer: MEDICARE

## 2024-05-09 VITALS
DIASTOLIC BLOOD PRESSURE: 81 MMHG | HEART RATE: 97 BPM | RESPIRATION RATE: 18 BRPM | TEMPERATURE: 97.9 F | SYSTOLIC BLOOD PRESSURE: 119 MMHG

## 2024-05-09 DIAGNOSIS — E53.8 VITAMIN B12 DEFICIENCY: ICD-10-CM

## 2024-05-09 DIAGNOSIS — C78.7 METASTATIC COLON CANCER TO LIVER (HCC): Primary | ICD-10-CM

## 2024-05-09 DIAGNOSIS — C18.9 METASTATIC COLON CANCER TO LIVER (HCC): Primary | ICD-10-CM

## 2024-05-09 PROCEDURE — 96360 HYDRATION IV INFUSION INIT: CPT

## 2024-05-09 PROCEDURE — 96372 THER/PROPH/DIAG INJ SC/IM: CPT

## 2024-05-09 RX ORDER — CYANOCOBALAMIN 1000 UG/ML
1000 INJECTION, SOLUTION INTRAMUSCULAR; SUBCUTANEOUS
Status: DISCONTINUED | OUTPATIENT
Start: 2024-05-09 | End: 2024-05-12 | Stop reason: HOSPADM

## 2024-05-09 RX ADMIN — PEGFILGRASTIM 6 MG: 6 INJECTION SUBCUTANEOUS at 13:24

## 2024-05-09 RX ADMIN — SODIUM CHLORIDE 1000 ML: 0.9 INJECTION, SOLUTION INTRAVENOUS at 13:19

## 2024-05-09 RX ADMIN — CYANOCOBALAMIN 1000 MCG: 1000 INJECTION INTRAMUSCULAR; SUBCUTANEOUS at 13:22

## 2024-05-09 NOTE — PROGRESS NOTES
Maira Moura  tolerated treatment well with no complications.      Maira Moura is aware of future appt on 5/20 at 12:30am.     AVS printed and given to Maira Moura:  Yes

## 2024-05-15 DIAGNOSIS — E87.6 HYPOKALEMIA: ICD-10-CM

## 2024-05-15 DIAGNOSIS — G62.9 NEUROPATHY: ICD-10-CM

## 2024-05-15 RX ORDER — CYANOCOBALAMIN 1000 UG/ML
1000 INJECTION, SOLUTION INTRAMUSCULAR; SUBCUTANEOUS
Status: CANCELLED
Start: 2024-05-24

## 2024-05-15 RX ORDER — POTASSIUM CHLORIDE 20 MEQ/1
20 TABLET, EXTENDED RELEASE ORAL 2 TIMES DAILY
Qty: 60 TABLET | Refills: 1 | Status: SHIPPED | OUTPATIENT
Start: 2024-05-15

## 2024-05-15 RX ORDER — LANOLIN ALCOHOL/MO/W.PET/CERES
50 CREAM (GRAM) TOPICAL DAILY
Qty: 90 TABLET | Refills: 0 | Status: SHIPPED | OUTPATIENT
Start: 2024-05-15

## 2024-05-15 RX ORDER — FLUOROURACIL 50 MG/ML
320 INJECTION, SOLUTION INTRAVENOUS ONCE
Status: CANCELLED | OUTPATIENT
Start: 2024-05-22

## 2024-05-15 RX ORDER — DEXTROSE MONOHYDRATE 50 MG/ML
20 INJECTION, SOLUTION INTRAVENOUS ONCE
OUTPATIENT
Start: 2024-06-04

## 2024-05-15 RX ORDER — SODIUM CHLORIDE 9 MG/ML
20 INJECTION, SOLUTION INTRAVENOUS ONCE AS NEEDED
OUTPATIENT
Start: 2024-06-04

## 2024-05-15 RX ORDER — CYANOCOBALAMIN 1000 UG/ML
1000 INJECTION, SOLUTION INTRAMUSCULAR; SUBCUTANEOUS
Start: 2024-06-06

## 2024-05-15 RX ORDER — FLUOROURACIL 50 MG/ML
320 INJECTION, SOLUTION INTRAVENOUS ONCE
OUTPATIENT
Start: 2024-06-04

## 2024-05-17 DIAGNOSIS — C78.7 METASTATIC COLON CANCER TO LIVER (HCC): Primary | ICD-10-CM

## 2024-05-17 DIAGNOSIS — C18.9 METASTATIC COLON CANCER TO LIVER (HCC): Primary | ICD-10-CM

## 2024-05-17 DIAGNOSIS — E53.8 VITAMIN B12 DEFICIENCY: ICD-10-CM

## 2024-05-20 ENCOUNTER — PATIENT OUTREACH (OUTPATIENT)
Dept: HEMATOLOGY ONCOLOGY | Facility: CLINIC | Age: 69
End: 2024-05-20

## 2024-05-20 ENCOUNTER — HOSPITAL ENCOUNTER (OUTPATIENT)
Dept: INFUSION CENTER | Facility: CLINIC | Age: 69
Discharge: HOME | End: 2024-05-20
Payer: MEDICARE

## 2024-05-20 DIAGNOSIS — E53.8 VITAMIN B12 DEFICIENCY: ICD-10-CM

## 2024-05-20 DIAGNOSIS — C78.7 METASTATIC COLON CANCER TO LIVER (HCC): Primary | ICD-10-CM

## 2024-05-20 DIAGNOSIS — C18.9 METASTATIC COLON CANCER TO LIVER (HCC): Primary | ICD-10-CM

## 2024-05-20 LAB
ALBUMIN SERPL BCP-MCNC: 3.6 G/DL (ref 3.5–5)
ALP SERPL-CCNC: 133 U/L (ref 34–104)
ALT SERPL W P-5'-P-CCNC: 31 U/L (ref 7–52)
ANION GAP SERPL CALCULATED.3IONS-SCNC: 8 MMOL/L (ref 4–13)
AST SERPL W P-5'-P-CCNC: 38 U/L (ref 13–39)
BASOPHILS # BLD AUTO: 0.03 THOUSANDS/ÂΜL (ref 0–0.1)
BASOPHILS NFR BLD AUTO: 0 % (ref 0–1)
BILIRUB SERPL-MCNC: 0.4 MG/DL (ref 0.2–1)
BUN SERPL-MCNC: 14 MG/DL (ref 5–25)
CALCIUM SERPL-MCNC: 9.4 MG/DL (ref 8.4–10.2)
CHLORIDE SERPL-SCNC: 105 MMOL/L (ref 96–108)
CO2 SERPL-SCNC: 25 MMOL/L (ref 21–32)
CREAT SERPL-MCNC: 0.76 MG/DL (ref 0.6–1.3)
EOSINOPHIL # BLD AUTO: 0.15 THOUSAND/ÂΜL (ref 0–0.61)
EOSINOPHIL NFR BLD AUTO: 2 % (ref 0–6)
ERYTHROCYTE [DISTWIDTH] IN BLOOD BY AUTOMATED COUNT: 17.8 % (ref 11.6–15.1)
GFR SERPL CREATININE-BSD FRML MDRD: 80 ML/MIN/1.73SQ M
GLUCOSE SERPL-MCNC: 112 MG/DL (ref 65–140)
HCT VFR BLD AUTO: 30.1 % (ref 34.8–46.1)
HGB BLD-MCNC: 9.6 G/DL (ref 11.5–15.4)
IMM GRANULOCYTES # BLD AUTO: 0.03 THOUSAND/UL (ref 0–0.2)
IMM GRANULOCYTES NFR BLD AUTO: 0 % (ref 0–2)
LYMPHOCYTES # BLD AUTO: 2.05 THOUSANDS/ÂΜL (ref 0.6–4.47)
LYMPHOCYTES NFR BLD AUTO: 23 % (ref 14–44)
MCH RBC QN AUTO: 29.2 PG (ref 26.8–34.3)
MCHC RBC AUTO-ENTMCNC: 31.9 G/DL (ref 31.4–37.4)
MCV RBC AUTO: 92 FL (ref 82–98)
MONOCYTES # BLD AUTO: 0.67 THOUSAND/ÂΜL (ref 0.17–1.22)
MONOCYTES NFR BLD AUTO: 7 % (ref 4–12)
NEUTROPHILS # BLD AUTO: 6.09 THOUSANDS/ÂΜL (ref 1.85–7.62)
NEUTS SEG NFR BLD AUTO: 68 % (ref 43–75)
NRBC BLD AUTO-RTO: 0 /100 WBCS
PLATELET # BLD AUTO: 228 THOUSANDS/UL (ref 149–390)
PMV BLD AUTO: 11.2 FL (ref 8.9–12.7)
POTASSIUM SERPL-SCNC: 3.6 MMOL/L (ref 3.5–5.3)
PROT SERPL-MCNC: 7.9 G/DL (ref 6.4–8.4)
RBC # BLD AUTO: 3.29 MILLION/UL (ref 3.81–5.12)
SODIUM SERPL-SCNC: 138 MMOL/L (ref 135–147)
TSH SERPL DL<=0.05 MIU/L-ACNC: 3.12 UIU/ML (ref 0.45–4.5)
WBC # BLD AUTO: 9.02 THOUSAND/UL (ref 4.31–10.16)

## 2024-05-20 PROCEDURE — 85025 COMPLETE CBC W/AUTO DIFF WBC: CPT | Performed by: INTERNAL MEDICINE

## 2024-05-20 PROCEDURE — 84443 ASSAY THYROID STIM HORMONE: CPT | Performed by: INTERNAL MEDICINE

## 2024-05-20 PROCEDURE — 80053 COMPREHEN METABOLIC PANEL: CPT | Performed by: INTERNAL MEDICINE

## 2024-05-20 NOTE — PROGRESS NOTES
Pt presents for central labs. No new meds or concerns. Port accessed and labs obtained per protocol with excellent blood return, tolerated well. Future appointments discussed, confirmed with pt for 5/21/24 at 1130. AVS declined.

## 2024-05-20 NOTE — PROGRESS NOTES
Pt called to confirm her upcoming appointments, and transportation to the appointments.  Spoke with her medical oncology team, and confirmed she has  LYFT  set up form her medical oncology appointment to her infusion appointment, then back home.  She understood, and was thankful for the assistance      Confirmed     From: Fani Rivas <[email or web address]>   Sent: Monday, May 20, 2024 11:38 AM  To: [facility] [employer] <[email or web address]>  Subject: Maira Moura [birth year]    [facility], just want to confirm she is set up for transportation to her medical oncology appt tomorrow [facility] 10:20am  then a  LYFT  to her infusion at [facility] for her 11:30am infusion  then back home  This was all scheduled previously, just confirming  Thanks!

## 2024-05-21 ENCOUNTER — HOSPITAL ENCOUNTER (OUTPATIENT)
Dept: INFUSION CENTER | Facility: CLINIC | Age: 69
Discharge: HOME | End: 2024-05-21

## 2024-05-21 ENCOUNTER — OFFICE VISIT (OUTPATIENT)
Dept: HEMATOLOGY ONCOLOGY | Facility: CLINIC | Age: 69
End: 2024-05-21
Payer: MEDICARE

## 2024-05-21 VITALS
BODY MASS INDEX: 31.39 KG/M2 | HEART RATE: 113 BPM | OXYGEN SATURATION: 98 % | DIASTOLIC BLOOD PRESSURE: 70 MMHG | SYSTOLIC BLOOD PRESSURE: 118 MMHG | RESPIRATION RATE: 18 BRPM | TEMPERATURE: 98 F | HEIGHT: 65 IN | WEIGHT: 188.4 LBS

## 2024-05-21 VITALS — BODY MASS INDEX: 32.28 KG/M2 | HEIGHT: 65 IN

## 2024-05-21 DIAGNOSIS — C09.9 RIGHT TONSILLAR SQUAMOUS CELL CARCINOMA (HCC): ICD-10-CM

## 2024-05-21 DIAGNOSIS — C78.7 METASTATIC COLON CANCER TO LIVER (HCC): Primary | ICD-10-CM

## 2024-05-21 DIAGNOSIS — C18.9 METASTATIC COLON CANCER TO LIVER (HCC): Primary | ICD-10-CM

## 2024-05-21 DIAGNOSIS — G62.9 NEUROPATHY: ICD-10-CM

## 2024-05-21 PROCEDURE — G2211 COMPLEX E/M VISIT ADD ON: HCPCS | Performed by: INTERNAL MEDICINE

## 2024-05-21 PROCEDURE — 99214 OFFICE O/P EST MOD 30 MIN: CPT | Performed by: INTERNAL MEDICINE

## 2024-05-21 RX ORDER — DEXTROSE MONOHYDRATE 50 MG/ML
20 INJECTION, SOLUTION INTRAVENOUS ONCE
OUTPATIENT
Start: 2024-06-18

## 2024-05-21 RX ORDER — SODIUM CHLORIDE 9 MG/ML
20 INJECTION, SOLUTION INTRAVENOUS ONCE AS NEEDED
OUTPATIENT
Start: 2024-06-18

## 2024-05-21 RX ORDER — CYANOCOBALAMIN 1000 UG/ML
1000 INJECTION, SOLUTION INTRAMUSCULAR; SUBCUTANEOUS
Start: 2024-07-04

## 2024-05-21 RX ORDER — GABAPENTIN 300 MG/1
CAPSULE ORAL
Qty: 120 CAPSULE | Refills: 3 | Status: SHIPPED | OUTPATIENT
Start: 2024-05-21

## 2024-05-21 RX ORDER — CYANOCOBALAMIN 1000 UG/ML
1000 INJECTION, SOLUTION INTRAMUSCULAR; SUBCUTANEOUS
Start: 2024-06-20

## 2024-05-21 RX ORDER — FLUOROURACIL 50 MG/ML
320 INJECTION, SOLUTION INTRAVENOUS ONCE
OUTPATIENT
Start: 2024-07-02

## 2024-05-21 RX ORDER — DEXTROSE MONOHYDRATE 50 MG/ML
20 INJECTION, SOLUTION INTRAVENOUS ONCE
OUTPATIENT
Start: 2024-07-02

## 2024-05-21 RX ORDER — FLUOROURACIL 50 MG/ML
320 INJECTION, SOLUTION INTRAVENOUS ONCE
OUTPATIENT
Start: 2024-06-18

## 2024-05-21 RX ORDER — SODIUM CHLORIDE 9 MG/ML
20 INJECTION, SOLUTION INTRAVENOUS ONCE AS NEEDED
OUTPATIENT
Start: 2024-07-02

## 2024-05-21 NOTE — H&P (VIEW-ONLY)
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, unresectable    2023 - FOLFOX    2023 - opdivo added to FOLFOX    2023-cycle 11-20% dose reduction of 5-FU bolus and oxaliplatin due to increased fatigue    2024 - oxaliplatin removed from treatment plan due to neuropathy - PET scan shows good disease control in all areas except colon lesion    3/2024 - right hemicolectomy - pT3N0    6/3/24 - planned for cryoablation to liver     Malignant neoplasm of right female breast (HCC)   2018 Initial Diagnosis    Malignant neoplasm of right female breast (HCC)     2018 Biopsy    Rt Breast US BX 1100 8cmfn, 4 passes 12g Marquee:  - Invasive breast carcinoma of no special type (ductal NST/invasive ductal carcinoma).  - grade 2  - %  - SD 95%  - HER2 negative     2018 Surgery    Right partial mastectomy and SLN biopsy:  Infiltrating ductal carcinoma, Grade 2/3, felt to be between 2.0 cm and 2.5 cm in greatest dimension  - No invasive tumor is seen at inked margins, but there is a focus of DCIS seen within approximately 1mm of the inked medial margin  - no LVI  - no LCIS  - 0/2 LN's  at least Stage IIA - pT2, pN0, cM0, G2.    - Dr Coronado     2018 -  Cancer Staged    Stage IIA - pT2, pN0, cM0, G2.       2019 Genomic Testing    Oncotype DX score: 13     2019 -  Hormone Therapy    anastrozole 1 mg daily as adjuvant endocrine therapy    - Dr Daley       2019 - 4/3/2019  "Radiation    Course: C1    Plan ID Energy Fractions Dose per Fraction (cGy) Dose Correction (cGy) Total Dose Delivered (cGy) Elapsed Days   R Breast 10X/6X 25 / 25 200 0 5,000 40   R BRST BOOST 16E 5 / 5 200 0 1,000 7      Treatment dates:  C1: 2/14/2019 - 4/3/2019       Metastatic colon cancer to liver (HCC)   7/6/2023 Genetic Testing    CARIS Results:   KRAS Pathogenic Variant Exon 2  p.G12D : lack of benefit of cetuximab, panitumumab Level 2   No other actionable mutation; MSI stable; TMB low   MiFOLOXAi Results: \"Decreased Benefit of FOLFOX + Bevacizumab in first line metastatic CRC.  This patient may achieve improved results by receiving an alternative to FOLFOX, such as FOLFIRI, as their initial regimen. As an adjustment to frontline FOLFOXIRI following toxicity: This patient may achieve improved results by removing the oxaliplatin portion of their regimen\".         7/11/2023 Initial Diagnosis    Metastatic colon cancer to liver (HCC)     7/13/2023 -  Cancer Staged    Staging form: Colon and Rectum, AJCC 8th Edition  - Clinical stage from 7/13/2023: cT3, cN0, pM1 - Signed by Harika Medina DO on 9/28/2023  Total positive nodes: 0       7/31/2023 -  Chemotherapy    cyanocobalamin, 1,000 mcg, Intramuscular, Every 30 days, 1 of 5 cycles  Administration: 1,000 mcg (5/9/2024)  potassium chloride, 20 mEq, Intravenous, Once, 2 of 2 cycles  Administration: 20 mEq (11/6/2023), 20 mEq (11/20/2023)  alteplase (CATHFLO), 2 mg, Intracatheter, Every 1 Minute as needed, 15 of 19 cycles  Administration: 2 mg (1/3/2024)  pegfilgrastim (NEULASTA), 6 mg, Subcutaneous, Once, 9 of 13 cycles  Administration: 6 mg (10/25/2023), 6 mg (11/8/2023), 6 mg (11/22/2023), 6 mg (12/6/2023), 6 mg (12/20/2023), 6 mg (1/12/2024), 6 mg (1/25/2024), 6 mg (4/25/2024), 6 mg (5/9/2024)  fluorouracil (ADRUCIL), 895 mg, Intravenous, Once, 15 of 19 cycles  Dose modification: 320 mg/m2 (original dose 400 mg/m2, Cycle 11)  Administration: 900 mg " (7/31/2023), 900 mg (8/14/2023), 900 mg (8/28/2023), 900 mg (9/11/2023), 900 mg (9/25/2023), 900 mg (10/9/2023), 900 mg (10/23/2023), 895 mg (11/6/2023), 895 mg (11/20/2023), 895 mg (12/4/2023), 700 mg (12/18/2023), 680 mg (1/10/2024), 680 mg (1/23/2024), 625 mg (4/23/2024), 625 mg (5/7/2024)  nivolumab (OPDIVO) IVPB, 240 mg (100 % of original dose 240 mg), Intravenous, Once, 7 of 11 cycles  Dose modification: 240 mg (original dose 240 mg, Cycle 9)  Administration: 240 mg (11/20/2023), 240 mg (12/4/2023), 240 mg (12/18/2023), 240 mg (1/10/2024), 240 mg (1/23/2024), 240 mg (4/23/2024), 240 mg (5/7/2024)  leucovorin calcium IVPB, 896 mg, Intravenous, Once, 15 of 19 cycles  Administration: 900 mg (7/31/2023), 900 mg (8/14/2023), 900 mg (8/28/2023), 900 mg (9/11/2023), 900 mg (9/25/2023), 900 mg (10/9/2023), 900 mg (10/23/2023), 900 mg (11/6/2023), 900 mg (11/20/2023), 900 mg (12/4/2023), 900 mg (12/18/2023), 850 mg (1/10/2024), 850 mg (1/23/2024), 800 mg (4/23/2024), 800 mg (5/7/2024)  oxaliplatin (ELOXATIN) chemo infusion, 85 mg/m2 = 190.4 mg, Intravenous, Once, 12 of 12 cycles  Dose modification: 68 mg/m2 (original dose 85 mg/m2, Cycle 11, Reason: Other (Must fill in a comment), Comment: increased fatigue)  Administration: 190.4 mg (7/31/2023), 190.4 mg (8/14/2023), 200 mg (8/28/2023), 200 mg (9/11/2023), 200 mg (9/25/2023), 200 mg (10/9/2023), 200 mg (10/23/2023), 200 mg (11/6/2023), 200 mg (11/20/2023), 190.4 mg (12/4/2023), 150 mg (12/18/2023), 150 mg (1/10/2024)  fluorouracil (ADRUCIL) ambulatory infusion Soln, 1,200 mg/m2/day = 5,375 mg, Intravenous, Over 46 hours, 15 of 19 cycles     9/26/2023 -  Cancer Staged    Staging form: Colon and Rectum, AJCC 8th Edition  - Pathologic: pT3, pN0, cM1 - Signed by Vickie Israel MD on 4/3/2024  Total positive nodes: 0       3/12/2024 Surgery    A. Terminal ileum, appendix, right colon (right hemicolectomy):  - Adenocarcinoma (5.2cm)  - Forty-nine (49) lymph nodes  negative for carcinoma (0/49)    - Acellular mucin present in one (1) lymph node   - See staging synoptic (ypT3N0)     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HCC)   7/27/2023 Initial Diagnosis    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HCC)     7/27/2023 -  Cancer Staged    Staging form: Pharynx - Oropharynx, AJCC 8th Edition  - Clinical stage from 7/27/2023: Stage III (cT1, cN3, cM0, p16+) - Signed by Evan Plummer MD on 7/27/2023  Stage prefix: Initial diagnosis           Interval History: Patient presents for follow-up of her metastatic rectal cancer and locally advanced carcinoma of the tonsil.  Patient is currently on 5-FU and nivolumab for systemic therapy.     She continues to lose weight; she lost about 5 lbs over the past 1 month. Although she often has an appetite and would like to eat, she has lost all her taste sensation which makes it hard for her to enjoy any food. She has been drinking ensure twice daily.     She continues to have persistent neuropathy involving her fingers and toes; she does occasionally drop things. They often feel very painful, especially at nighttime. She is currently taking gabapentin 300mg TID.     As for the constipation, she has BMs every 2-3 days with taking colace twice daily and dulcolax once daily. She wasn't able to buy Miralax OTC as the cost was very expensive. Denied any blooy in her stools.     She has met with Dr. Hutchins from  and plan is to proceed with liver ablation to address the persistent liver lesion seen on most recent imaging.     Review of Systems   Constitutional:  Positive for unexpected weight change (continues to lose weight, about 5 lbs over last 1 month). Negative for chills, fatigue and fever.   HENT:   Negative for sore throat, trouble swallowing and voice change.         No taste sensation   Eyes:  Negative for eye problems and icterus.   Respiratory:  Negative for chest tightness, cough, hemoptysis, shortness of breath and wheezing.     Cardiovascular:  Negative for chest pain, leg swelling and palpitations.   Gastrointestinal:  Positive for constipation (BM every 2-3 days). Negative for abdominal pain, blood in stool, diarrhea, nausea and vomiting.   Genitourinary:  Negative for dysuria, frequency and hematuria.    Musculoskeletal:  Negative for arthralgias, back pain, gait problem and myalgias.   Neurological:  Positive for numbness (persistent neuropathy involving fingers/toes). Negative for dizziness, extremity weakness, gait problem, headaches, light-headedness, seizures and speech difficulty.   Hematological:  Negative for adenopathy. Does not bruise/bleed easily.   Psychiatric/Behavioral:  Negative for confusion. The patient is not nervous/anxious.       ECOG PS: 1    PROBLEM LIST:  Patient Active Problem List   Diagnosis    Primary hypertension    Mixed hyperlipidemia    Malignant neoplasm of right female breast (HCC)    Vitamin D deficiency    Prediabetes    Right thyroid nodule    GERD (gastroesophageal reflux disease)    Obesity    Metastatic colon cancer to liver (HCC)    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HCC)    Poor appetite    Nutritional anemia    Dehydration    Drug-induced constipation    Chemotherapy induced neutropenia (HCC)    Stage 3a chronic kidney disease (HCC)    Thrombocytopenia (HCC)    Neuropathy    Diastolic dysfunction    Hypokalemia    Leukemoid reaction    GOGO (acute kidney injury) (HCC)    Acute post-operative pain    At risk for constipation    At risk for delirium    Advance care planning    Physical deconditioning    History of CVA (cerebrovascular accident)    Chronic nasal congestion    Elevated LFTs    Vitamin B12 deficiency     Assessment / Plan:     1. Metastatic colon cancer to liver (HCC)  gabapentin (NEURONTIN) 300 mg capsule      2. 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HCC)        3. Neuropathy            Ms. oMura is a 69-year-old female with metastatic rectal cancer and locally advanced  carcinoma of the tonsil. She is s/p right hemicolectomy on 3/12/24. She is currently on systemic therapy with 5-FU and nivolumab with improvement of her disease status. She is planned for liver cryoablation on 6/3/24 with IR to address the solitary/persistent metastatic lesion in the liver.     Although she is doing ok overall, she continues to have persistent neuropathy. Patient was advised to increase the gabapentin dosing (300mg in the morning, 300mg in the afternoon, then 600mg at bedtime) to see if that would help ameliorate some of the neuropathy. Patient's continued weight loss is concerning. She was advised to drink between 3-4 bottles daily; she was provided some free samples from the office today as well. Patient was advised to f/u in office towards ends of June. She knows to call us with any questions/concerns in the interim.       Past Medical History:   has a past medical history of Anemia, Arthritis, Body mass index (BMI) 40.0-44.9, adult (HCC), Breast cancer (HCC) (12/17/2018), Cancer (HCC), Cervical lymphadenopathy, Encounter for screening for HIV (07/07/2022), Follow-up examination (04/04/2023), GERD (gastroesophageal reflux disease), Hyperlipidemia, Hypertension, Obesity, Stroke (HCC), Stroke (HCC), Transaminitis (09/22/2021), and Vasomotor rhinitis.    PAST SURGICAL HISTORY:   has a past surgical history that includes US guided breast biopsy right complete (Right, 11/23/2018); Breast surgery; pr mastectomy partial w/axillary lymphadenectomy (Right, 12/20/2018); Tubal ligation; Breast biopsy (Right, 11/23/2018); US guided injection for research study (12/20/2018); US guided injection for research study (12/07/2018); US guided injection for research study (05/03/2019); US guided lymph node biopsy right (05/26/2023); IR biopsy liver mass (06/27/2023); pr laryngoscopy w/biopsy microscope/telescope (N/A, 07/20/2023); pr brnchsc incl fluor gdnce dx w/cell washg spx (N/A, 07/20/2023); ESOPHAGOSCOPY (N/A,  07/20/2023); IR port placement (07/28/2023); IR port check (01/03/2024); IR port stripping (01/09/2024); Colonoscopy; Hemicoloectomy w/ anastomosis (Right, 3/12/2024); and LAPAROTOMY (N/A, 3/12/2024).    CURRENT MEDICATIONS  Current Outpatient Medications   Medication Sig Dispense Refill    gabapentin (NEURONTIN) 300 mg capsule Take 1 pills in the morning, 1 pill at lunch and 2 pills before bed 120 capsule 3    anastrozole (ARIMIDEX) 1 mg tablet Take 1 tablet (1 mg total) by mouth daily 90 tablet 3    atorvastatin (LIPITOR) 40 mg tablet Take 1 tablet (40 mg total) by mouth daily at bedtime 30 tablet 2    Cyanocobalamin (Vitamin B-12) 1000 MCG SUBL Place 1 tablet (1,000 mcg total) under the tongue in the morning 90 tablet 1    docusate sodium (COLACE) 50 mg capsule Take 1 capsule (50 mg total) by mouth 2 (two) times a day 90 capsule 1    ergocalciferol (VITAMIN D2) 50,000 units Take 1 capsule (50,000 Units total) by mouth once a week 90 capsule 3    ferrous sulfate 325 (65 Fe) mg tablet Take 1 tablet (325 mg total) by mouth daily with breakfast 30 tablet 0    fluorouracil 4,680 mg in CADD/Elastomeric Infusion Device Infuse 4,680 mg (1,200 mg/m2/day x 1.95 m2) into a catheter in a vein via infusion device over 46 hours for 2 days  Infusion planned for May 21, 2024. 1 each 0    folic acid (FOLVITE) 1 mg tablet Take 1 tablet (1 mg total) by mouth in the morning 90 tablet 3    hydrocortisone 1 % ointment       lidocaine-prilocaine (EMLA) cream Apply topically as needed for mild pain 30 g 3    losartan (COZAAR) 50 mg tablet Take 1 tablet (50 mg total) by mouth 2 (two) times a day 60 tablet 0    mirtazapine (REMERON) 15 mg tablet Take 1 tablet (15 mg total) by mouth daily at bedtime Patient is also on a 30 mg tablet, for a total dose of 45 mg 30 tablet 3    mirtazapine (REMERON) 30 mg tablet Take 1 tablet (30 mg total) by mouth daily at bedtime 30 tablet 3    omeprazole (PriLOSEC) 20 mg delayed release capsule Take 1  "capsule (20 mg total) by mouth daily 30 capsule 5    ondansetron (ZOFRAN) 8 mg tablet Take 1 tablet (8 mg total) by mouth every 8 (eight) hours as needed for nausea or vomiting 30 tablet 3    potassium chloride (Klor-Con M20) 20 mEq tablet Take 1 tablet (20 mEq total) by mouth 2 (two) times a day 60 tablet 1    prochlorperazine (COMPAZINE) 10 mg tablet Take 1 tablet (10 mg total) by mouth every 6 (six) hours as needed for nausea or vomiting 30 tablet 3    pyridoxine (VITAMIN B6) 50 mg tablet Take 1 tablet (50 mg total) by mouth daily 90 tablet 0     No current facility-administered medications for this visit.     Facility-Administered Medications Ordered in Other Visits   Medication Dose Route Frequency Provider Last Rate Last Admin    alteplase (CATHFLO) injection 2 mg  2 mg Intracatheter Q1MIN PRN Harika Medina DO         [unfilled]    SOCIAL HISTORY:   reports that she has never smoked. She has never been exposed to tobacco smoke. She has never used smokeless tobacco. She reports that she does not drink alcohol and does not use drugs.     FAMILY HISTORY:  family history includes Colon cancer (age of onset: 58) in her mother; Hypertension in her daughter, mother, and son; Pancreatic cancer (age of onset: 60) in her father.     ALLERGIES:  has No Known Allergies.      Physical Exam:  Vital Signs:   Visit Vitals  /70 (BP Location: Left arm, Patient Position: Sitting, Cuff Size: Adult)   Pulse (!) 113   Temp 98 °F (36.7 °C)   Resp 18   Ht 5' 5\" (1.651 m)   Wt 85.5 kg (188 lb 6.4 oz)   SpO2 98%   BMI 31.35 kg/m²   OB Status Postmenopausal   Smoking Status Never   BSA 1.93 m²     Body mass index is 31.35 kg/m².  Body surface area is 1.93 meters squared.    GEN: Alert, awake oriented x3, in no acute distress  HEENT- No pallor, icterus, cyanosis, no oral mucosal lesions,   LAD - no palpable cervical, clavicle, axillary, inguinal LAD  Heart- normal S1 S2, regular rate and rhythm, No murmur, rubs.   Lungs- clear " breathing sound bilateral.   Abdomen- soft, Non tender, bowel sounds present  Extremities- No cyanosis, clubbing, edema  Neuro- No focal neurological deficit    Labs:  Lab Results   Component Value Date    WBC 9.02 05/20/2024    HGB 9.6 (L) 05/20/2024    HCT 30.1 (L) 05/20/2024    MCV 92 05/20/2024     05/20/2024     Lab Results   Component Value Date    SODIUM 138 05/20/2024    K 3.6 05/20/2024     05/20/2024    CO2 25 05/20/2024    AGAP 8 05/20/2024    BUN 14 05/20/2024    CREATININE 0.76 05/20/2024    GLUC 112 05/20/2024    GLUF 120 (H) 04/24/2024    CALCIUM 9.4 05/20/2024    AST 38 05/20/2024    ALT 31 05/20/2024    ALKPHOS 133 (H) 05/20/2024    TP 7.9 05/20/2024    TBILI 0.40 05/20/2024    EGFR 80 05/20/2024

## 2024-05-21 NOTE — PROGRESS NOTES
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, unresectable    2023 - FOLFOX    2023 - opdivo added to FOLFOX    2023-cycle 11-20% dose reduction of 5-FU bolus and oxaliplatin due to increased fatigue    2024 - oxaliplatin removed from treatment plan due to neuropathy - PET scan shows good disease control in all areas except colon lesion    3/2024 - right hemicolectomy - pT3N0    6/3/24 - planned for cryoablation to liver     Malignant neoplasm of right female breast (HCC)   2018 Initial Diagnosis    Malignant neoplasm of right female breast (HCC)     2018 Biopsy    Rt Breast US BX 1100 8cmfn, 4 passes 12g Marquee:  - Invasive breast carcinoma of no special type (ductal NST/invasive ductal carcinoma).  - grade 2  - %  - WI 95%  - HER2 negative     2018 Surgery    Right partial mastectomy and SLN biopsy:  Infiltrating ductal carcinoma, Grade 2/3, felt to be between 2.0 cm and 2.5 cm in greatest dimension  - No invasive tumor is seen at inked margins, but there is a focus of DCIS seen within approximately 1mm of the inked medial margin  - no LVI  - no LCIS  - 0/2 LN's  at least Stage IIA - pT2, pN0, cM0, G2.    - Dr Coronado     2018 -  Cancer Staged    Stage IIA - pT2, pN0, cM0, G2.       2019 Genomic Testing    Oncotype DX score: 13     2019 -  Hormone Therapy    anastrozole 1 mg daily as adjuvant endocrine therapy    - Dr Daley       2019 - 4/3/2019  "Radiation    Course: C1    Plan ID Energy Fractions Dose per Fraction (cGy) Dose Correction (cGy) Total Dose Delivered (cGy) Elapsed Days   R Breast 10X/6X 25 / 25 200 0 5,000 40   R BRST BOOST 16E 5 / 5 200 0 1,000 7      Treatment dates:  C1: 2/14/2019 - 4/3/2019       Metastatic colon cancer to liver (HCC)   7/6/2023 Genetic Testing    CARIS Results:   KRAS Pathogenic Variant Exon 2  p.G12D : lack of benefit of cetuximab, panitumumab Level 2   No other actionable mutation; MSI stable; TMB low   MiFOLOXAi Results: \"Decreased Benefit of FOLFOX + Bevacizumab in first line metastatic CRC.  This patient may achieve improved results by receiving an alternative to FOLFOX, such as FOLFIRI, as their initial regimen. As an adjustment to frontline FOLFOXIRI following toxicity: This patient may achieve improved results by removing the oxaliplatin portion of their regimen\".         7/11/2023 Initial Diagnosis    Metastatic colon cancer to liver (HCC)     7/13/2023 -  Cancer Staged    Staging form: Colon and Rectum, AJCC 8th Edition  - Clinical stage from 7/13/2023: cT3, cN0, pM1 - Signed by Harika Medina DO on 9/28/2023  Total positive nodes: 0       7/31/2023 -  Chemotherapy    cyanocobalamin, 1,000 mcg, Intramuscular, Every 30 days, 1 of 5 cycles  Administration: 1,000 mcg (5/9/2024)  potassium chloride, 20 mEq, Intravenous, Once, 2 of 2 cycles  Administration: 20 mEq (11/6/2023), 20 mEq (11/20/2023)  alteplase (CATHFLO), 2 mg, Intracatheter, Every 1 Minute as needed, 15 of 19 cycles  Administration: 2 mg (1/3/2024)  pegfilgrastim (NEULASTA), 6 mg, Subcutaneous, Once, 9 of 13 cycles  Administration: 6 mg (10/25/2023), 6 mg (11/8/2023), 6 mg (11/22/2023), 6 mg (12/6/2023), 6 mg (12/20/2023), 6 mg (1/12/2024), 6 mg (1/25/2024), 6 mg (4/25/2024), 6 mg (5/9/2024)  fluorouracil (ADRUCIL), 895 mg, Intravenous, Once, 15 of 19 cycles  Dose modification: 320 mg/m2 (original dose 400 mg/m2, Cycle 11)  Administration: 900 mg " (7/31/2023), 900 mg (8/14/2023), 900 mg (8/28/2023), 900 mg (9/11/2023), 900 mg (9/25/2023), 900 mg (10/9/2023), 900 mg (10/23/2023), 895 mg (11/6/2023), 895 mg (11/20/2023), 895 mg (12/4/2023), 700 mg (12/18/2023), 680 mg (1/10/2024), 680 mg (1/23/2024), 625 mg (4/23/2024), 625 mg (5/7/2024)  nivolumab (OPDIVO) IVPB, 240 mg (100 % of original dose 240 mg), Intravenous, Once, 7 of 11 cycles  Dose modification: 240 mg (original dose 240 mg, Cycle 9)  Administration: 240 mg (11/20/2023), 240 mg (12/4/2023), 240 mg (12/18/2023), 240 mg (1/10/2024), 240 mg (1/23/2024), 240 mg (4/23/2024), 240 mg (5/7/2024)  leucovorin calcium IVPB, 896 mg, Intravenous, Once, 15 of 19 cycles  Administration: 900 mg (7/31/2023), 900 mg (8/14/2023), 900 mg (8/28/2023), 900 mg (9/11/2023), 900 mg (9/25/2023), 900 mg (10/9/2023), 900 mg (10/23/2023), 900 mg (11/6/2023), 900 mg (11/20/2023), 900 mg (12/4/2023), 900 mg (12/18/2023), 850 mg (1/10/2024), 850 mg (1/23/2024), 800 mg (4/23/2024), 800 mg (5/7/2024)  oxaliplatin (ELOXATIN) chemo infusion, 85 mg/m2 = 190.4 mg, Intravenous, Once, 12 of 12 cycles  Dose modification: 68 mg/m2 (original dose 85 mg/m2, Cycle 11, Reason: Other (Must fill in a comment), Comment: increased fatigue)  Administration: 190.4 mg (7/31/2023), 190.4 mg (8/14/2023), 200 mg (8/28/2023), 200 mg (9/11/2023), 200 mg (9/25/2023), 200 mg (10/9/2023), 200 mg (10/23/2023), 200 mg (11/6/2023), 200 mg (11/20/2023), 190.4 mg (12/4/2023), 150 mg (12/18/2023), 150 mg (1/10/2024)  fluorouracil (ADRUCIL) ambulatory infusion Soln, 1,200 mg/m2/day = 5,375 mg, Intravenous, Over 46 hours, 15 of 19 cycles     9/26/2023 -  Cancer Staged    Staging form: Colon and Rectum, AJCC 8th Edition  - Pathologic: pT3, pN0, cM1 - Signed by Vickie Israel MD on 4/3/2024  Total positive nodes: 0       3/12/2024 Surgery    A. Terminal ileum, appendix, right colon (right hemicolectomy):  - Adenocarcinoma (5.2cm)  - Forty-nine (49) lymph nodes  negative for carcinoma (0/49)    - Acellular mucin present in one (1) lymph node   - See staging synoptic (ypT3N0)     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HCC)   7/27/2023 Initial Diagnosis    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HCC)     7/27/2023 -  Cancer Staged    Staging form: Pharynx - Oropharynx, AJCC 8th Edition  - Clinical stage from 7/27/2023: Stage III (cT1, cN3, cM0, p16+) - Signed by Evan Plummer MD on 7/27/2023  Stage prefix: Initial diagnosis           Interval History: Patient presents for follow-up of her metastatic rectal cancer and locally advanced carcinoma of the tonsil.  Patient is currently on 5-FU and nivolumab for systemic therapy.     She continues to lose weight; she lost about 5 lbs over the past 1 month. Although she often has an appetite and would like to eat, she has lost all her taste sensation which makes it hard for her to enjoy any food. She has been drinking ensure twice daily.     She continues to have persistent neuropathy involving her fingers and toes; she does occasionally drop things. They often feel very painful, especially at nighttime. She is currently taking gabapentin 300mg TID.     As for the constipation, she has BMs every 2-3 days with taking colace twice daily and dulcolax once daily. She wasn't able to buy Miralax OTC as the cost was very expensive. Denied any blooy in her stools.     She has met with Dr. Hutchins from  and plan is to proceed with liver ablation to address the persistent liver lesion seen on most recent imaging.     Review of Systems   Constitutional:  Positive for unexpected weight change (continues to lose weight, about 5 lbs over last 1 month). Negative for chills, fatigue and fever.   HENT:   Negative for sore throat, trouble swallowing and voice change.         No taste sensation   Eyes:  Negative for eye problems and icterus.   Respiratory:  Negative for chest tightness, cough, hemoptysis, shortness of breath and wheezing.     Cardiovascular:  Negative for chest pain, leg swelling and palpitations.   Gastrointestinal:  Positive for constipation (BM every 2-3 days). Negative for abdominal pain, blood in stool, diarrhea, nausea and vomiting.   Genitourinary:  Negative for dysuria, frequency and hematuria.    Musculoskeletal:  Negative for arthralgias, back pain, gait problem and myalgias.   Neurological:  Positive for numbness (persistent neuropathy involving fingers/toes). Negative for dizziness, extremity weakness, gait problem, headaches, light-headedness, seizures and speech difficulty.   Hematological:  Negative for adenopathy. Does not bruise/bleed easily.   Psychiatric/Behavioral:  Negative for confusion. The patient is not nervous/anxious.       ECOG PS: 1    PROBLEM LIST:  Patient Active Problem List   Diagnosis    Primary hypertension    Mixed hyperlipidemia    Malignant neoplasm of right female breast (HCC)    Vitamin D deficiency    Prediabetes    Right thyroid nodule    GERD (gastroesophageal reflux disease)    Obesity    Metastatic colon cancer to liver (HCC)    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HCC)    Poor appetite    Nutritional anemia    Dehydration    Drug-induced constipation    Chemotherapy induced neutropenia (HCC)    Stage 3a chronic kidney disease (HCC)    Thrombocytopenia (HCC)    Neuropathy    Diastolic dysfunction    Hypokalemia    Leukemoid reaction    GOGO (acute kidney injury) (HCC)    Acute post-operative pain    At risk for constipation    At risk for delirium    Advance care planning    Physical deconditioning    History of CVA (cerebrovascular accident)    Chronic nasal congestion    Elevated LFTs    Vitamin B12 deficiency     Assessment / Plan:     1. Metastatic colon cancer to liver (HCC)  gabapentin (NEURONTIN) 300 mg capsule      2. 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HCC)        3. Neuropathy            Ms. Moura is a 69-year-old female with metastatic rectal cancer and locally advanced  carcinoma of the tonsil. She is s/p right hemicolectomy on 3/12/24. She is currently on systemic therapy with 5-FU and nivolumab with improvement of her disease status. She is planned for liver cryoablation on 6/3/24 with IR to address the solitary/persistent metastatic lesion in the liver.     Although she is doing ok overall, she continues to have persistent neuropathy. Patient was advised to increase the gabapentin dosing (300mg in the morning, 300mg in the afternoon, then 600mg at bedtime) to see if that would help ameliorate some of the neuropathy. Patient's continued weight loss is concerning. She was advised to drink between 3-4 bottles daily; she was provided some free samples from the office today as well. Patient was advised to f/u in office towards ends of June. She knows to call us with any questions/concerns in the interim.       Past Medical History:   has a past medical history of Anemia, Arthritis, Body mass index (BMI) 40.0-44.9, adult (HCC), Breast cancer (HCC) (12/17/2018), Cancer (HCC), Cervical lymphadenopathy, Encounter for screening for HIV (07/07/2022), Follow-up examination (04/04/2023), GERD (gastroesophageal reflux disease), Hyperlipidemia, Hypertension, Obesity, Stroke (HCC), Stroke (HCC), Transaminitis (09/22/2021), and Vasomotor rhinitis.    PAST SURGICAL HISTORY:   has a past surgical history that includes US guided breast biopsy right complete (Right, 11/23/2018); Breast surgery; pr mastectomy partial w/axillary lymphadenectomy (Right, 12/20/2018); Tubal ligation; Breast biopsy (Right, 11/23/2018); US guided injection for research study (12/20/2018); US guided injection for research study (12/07/2018); US guided injection for research study (05/03/2019); US guided lymph node biopsy right (05/26/2023); IR biopsy liver mass (06/27/2023); pr laryngoscopy w/biopsy microscope/telescope (N/A, 07/20/2023); pr brnchsc incl fluor gdnce dx w/cell washg spx (N/A, 07/20/2023); ESOPHAGOSCOPY (N/A,  07/20/2023); IR port placement (07/28/2023); IR port check (01/03/2024); IR port stripping (01/09/2024); Colonoscopy; Hemicoloectomy w/ anastomosis (Right, 3/12/2024); and LAPAROTOMY (N/A, 3/12/2024).    CURRENT MEDICATIONS  Current Outpatient Medications   Medication Sig Dispense Refill    gabapentin (NEURONTIN) 300 mg capsule Take 1 pills in the morning, 1 pill at lunch and 2 pills before bed 120 capsule 3    anastrozole (ARIMIDEX) 1 mg tablet Take 1 tablet (1 mg total) by mouth daily 90 tablet 3    atorvastatin (LIPITOR) 40 mg tablet Take 1 tablet (40 mg total) by mouth daily at bedtime 30 tablet 2    Cyanocobalamin (Vitamin B-12) 1000 MCG SUBL Place 1 tablet (1,000 mcg total) under the tongue in the morning 90 tablet 1    docusate sodium (COLACE) 50 mg capsule Take 1 capsule (50 mg total) by mouth 2 (two) times a day 90 capsule 1    ergocalciferol (VITAMIN D2) 50,000 units Take 1 capsule (50,000 Units total) by mouth once a week 90 capsule 3    ferrous sulfate 325 (65 Fe) mg tablet Take 1 tablet (325 mg total) by mouth daily with breakfast 30 tablet 0    fluorouracil 4,680 mg in CADD/Elastomeric Infusion Device Infuse 4,680 mg (1,200 mg/m2/day x 1.95 m2) into a catheter in a vein via infusion device over 46 hours for 2 days  Infusion planned for May 21, 2024. 1 each 0    folic acid (FOLVITE) 1 mg tablet Take 1 tablet (1 mg total) by mouth in the morning 90 tablet 3    hydrocortisone 1 % ointment       lidocaine-prilocaine (EMLA) cream Apply topically as needed for mild pain 30 g 3    losartan (COZAAR) 50 mg tablet Take 1 tablet (50 mg total) by mouth 2 (two) times a day 60 tablet 0    mirtazapine (REMERON) 15 mg tablet Take 1 tablet (15 mg total) by mouth daily at bedtime Patient is also on a 30 mg tablet, for a total dose of 45 mg 30 tablet 3    mirtazapine (REMERON) 30 mg tablet Take 1 tablet (30 mg total) by mouth daily at bedtime 30 tablet 3    omeprazole (PriLOSEC) 20 mg delayed release capsule Take 1  "capsule (20 mg total) by mouth daily 30 capsule 5    ondansetron (ZOFRAN) 8 mg tablet Take 1 tablet (8 mg total) by mouth every 8 (eight) hours as needed for nausea or vomiting 30 tablet 3    potassium chloride (Klor-Con M20) 20 mEq tablet Take 1 tablet (20 mEq total) by mouth 2 (two) times a day 60 tablet 1    prochlorperazine (COMPAZINE) 10 mg tablet Take 1 tablet (10 mg total) by mouth every 6 (six) hours as needed for nausea or vomiting 30 tablet 3    pyridoxine (VITAMIN B6) 50 mg tablet Take 1 tablet (50 mg total) by mouth daily 90 tablet 0     No current facility-administered medications for this visit.     Facility-Administered Medications Ordered in Other Visits   Medication Dose Route Frequency Provider Last Rate Last Admin    alteplase (CATHFLO) injection 2 mg  2 mg Intracatheter Q1MIN PRN Harika Medina DO         [unfilled]    SOCIAL HISTORY:   reports that she has never smoked. She has never been exposed to tobacco smoke. She has never used smokeless tobacco. She reports that she does not drink alcohol and does not use drugs.     FAMILY HISTORY:  family history includes Colon cancer (age of onset: 58) in her mother; Hypertension in her daughter, mother, and son; Pancreatic cancer (age of onset: 60) in her father.     ALLERGIES:  has No Known Allergies.      Physical Exam:  Vital Signs:   Visit Vitals  /70 (BP Location: Left arm, Patient Position: Sitting, Cuff Size: Adult)   Pulse (!) 113   Temp 98 °F (36.7 °C)   Resp 18   Ht 5' 5\" (1.651 m)   Wt 85.5 kg (188 lb 6.4 oz)   SpO2 98%   BMI 31.35 kg/m²   OB Status Postmenopausal   Smoking Status Never   BSA 1.93 m²     Body mass index is 31.35 kg/m².  Body surface area is 1.93 meters squared.    GEN: Alert, awake oriented x3, in no acute distress  HEENT- No pallor, icterus, cyanosis, no oral mucosal lesions,   LAD - no palpable cervical, clavicle, axillary, inguinal LAD  Heart- normal S1 S2, regular rate and rhythm, No murmur, rubs.   Lungs- clear " breathing sound bilateral.   Abdomen- soft, Non tender, bowel sounds present  Extremities- No cyanosis, clubbing, edema  Neuro- No focal neurological deficit    Labs:  Lab Results   Component Value Date    WBC 9.02 05/20/2024    HGB 9.6 (L) 05/20/2024    HCT 30.1 (L) 05/20/2024    MCV 92 05/20/2024     05/20/2024     Lab Results   Component Value Date    SODIUM 138 05/20/2024    K 3.6 05/20/2024     05/20/2024    CO2 25 05/20/2024    AGAP 8 05/20/2024    BUN 14 05/20/2024    CREATININE 0.76 05/20/2024    GLUC 112 05/20/2024    GLUF 120 (H) 04/24/2024    CALCIUM 9.4 05/20/2024    AST 38 05/20/2024    ALT 31 05/20/2024    ALKPHOS 133 (H) 05/20/2024    TP 7.9 05/20/2024    TBILI 0.40 05/20/2024    EGFR 80 05/20/2024

## 2024-05-22 ENCOUNTER — HOSPITAL ENCOUNTER (OUTPATIENT)
Dept: INFUSION CENTER | Facility: CLINIC | Age: 69
Discharge: HOME | End: 2024-05-22
Payer: MEDICARE

## 2024-05-22 VITALS
WEIGHT: 190.92 LBS | HEART RATE: 18 BPM | SYSTOLIC BLOOD PRESSURE: 105 MMHG | BODY MASS INDEX: 31.81 KG/M2 | DIASTOLIC BLOOD PRESSURE: 72 MMHG | HEIGHT: 65 IN | RESPIRATION RATE: 18 BRPM | TEMPERATURE: 97.4 F

## 2024-05-22 DIAGNOSIS — C78.7 METASTATIC COLON CANCER TO LIVER (HCC): Primary | ICD-10-CM

## 2024-05-22 DIAGNOSIS — E53.8 VITAMIN B12 DEFICIENCY: Primary | ICD-10-CM

## 2024-05-22 DIAGNOSIS — C78.7 METASTATIC COLON CANCER TO LIVER (HCC): ICD-10-CM

## 2024-05-22 DIAGNOSIS — C18.9 METASTATIC COLON CANCER TO LIVER (HCC): Primary | ICD-10-CM

## 2024-05-22 DIAGNOSIS — C18.9 METASTATIC COLON CANCER TO LIVER (HCC): ICD-10-CM

## 2024-05-22 PROCEDURE — 96413 CHEMO IV INFUSION 1 HR: CPT

## 2024-05-22 PROCEDURE — G0498 CHEMO EXTEND IV INFUS W/PUMP: HCPCS

## 2024-05-22 PROCEDURE — 96367 TX/PROPH/DG ADDL SEQ IV INF: CPT

## 2024-05-22 PROCEDURE — 96411 CHEMO IV PUSH ADDL DRUG: CPT

## 2024-05-22 RX ORDER — FLUOROURACIL 50 MG/ML
320 INJECTION, SOLUTION INTRAVENOUS ONCE
Status: COMPLETED | OUTPATIENT
Start: 2024-05-22 | End: 2024-05-22

## 2024-05-22 RX ORDER — DEXTROSE MONOHYDRATE 50 MG/ML
20 INJECTION, SOLUTION INTRAVENOUS ONCE
Status: DISCONTINUED | OUTPATIENT
Start: 2024-05-22 | End: 2024-05-25 | Stop reason: HOSPADM

## 2024-05-22 RX ORDER — LACTOSE-REDUCED FOOD
1 LIQUID (ML) ORAL 3 TIMES DAILY
Qty: 237 ML | Refills: 1 | Status: CANCELLED | OUTPATIENT
Start: 2024-05-22

## 2024-05-22 RX ORDER — SODIUM CHLORIDE 9 MG/ML
20 INJECTION, SOLUTION INTRAVENOUS ONCE AS NEEDED
Status: DISCONTINUED | OUTPATIENT
Start: 2024-05-22 | End: 2024-05-25 | Stop reason: HOSPADM

## 2024-05-22 RX ADMIN — SODIUM CHLORIDE 1000 ML: 9 INJECTION, SOLUTION INTRAVENOUS at 12:19

## 2024-05-22 RX ADMIN — LEUCOVORIN CALCIUM 800 MG: 500 INJECTION, POWDER, LYOPHILIZED, FOR SOLUTION INTRAMUSCULAR; INTRAVENOUS at 14:09

## 2024-05-22 RX ADMIN — SODIUM CHLORIDE 240 MG: 9 INJECTION, SOLUTION INTRAVENOUS at 13:15

## 2024-05-22 RX ADMIN — SODIUM CHLORIDE 20 ML/HR: 9 INJECTION, SOLUTION INTRAVENOUS at 12:18

## 2024-05-22 RX ADMIN — DEXAMETHASONE SODIUM PHOSPHATE: 100 INJECTION INTRAMUSCULAR; INTRAVENOUS at 12:30

## 2024-05-22 RX ADMIN — FLUOROURACIL 625 MG: 50 INJECTION, SOLUTION INTRAVENOUS at 14:40

## 2024-05-22 NOTE — TELEPHONE ENCOUNTER
Script pended. Called patient to figure out where she wants the script sent to. Left a voicemail for her to call back on my teams number.

## 2024-05-22 NOTE — PROGRESS NOTES
Pt presents for opdivo, leucovorin, 5FU push, 5FU BRANDIE. No new meds or concerns. Pt tolerated infusion without adverse reaction. BRANDIE connected per protocol. Pt is aware of trouble shooting, when to call [facility] and the office. Future appointments discussed, confirmed with pt on 5/24/24 at 1300. AVS declined.

## 2024-05-23 ENCOUNTER — HOSPITAL ENCOUNTER (OUTPATIENT)
Dept: INFUSION CENTER | Facility: CLINIC | Age: 69
End: 2024-05-23

## 2024-05-24 ENCOUNTER — HOSPITAL ENCOUNTER (OUTPATIENT)
Dept: INFUSION CENTER | Facility: CLINIC | Age: 69
End: 2024-05-24
Payer: MEDICARE

## 2024-05-24 VITALS
TEMPERATURE: 97.7 F | SYSTOLIC BLOOD PRESSURE: 116 MMHG | RESPIRATION RATE: 18 BRPM | HEART RATE: 18 BPM | DIASTOLIC BLOOD PRESSURE: 79 MMHG

## 2024-05-24 DIAGNOSIS — E53.8 VITAMIN B12 DEFICIENCY: Primary | ICD-10-CM

## 2024-05-24 DIAGNOSIS — C78.7 METASTATIC COLON CANCER TO LIVER (HCC): ICD-10-CM

## 2024-05-24 DIAGNOSIS — C18.9 METASTATIC COLON CANCER TO LIVER (HCC): ICD-10-CM

## 2024-05-24 PROCEDURE — 96360 HYDRATION IV INFUSION INIT: CPT

## 2024-05-24 PROCEDURE — 96372 THER/PROPH/DIAG INJ SC/IM: CPT

## 2024-05-24 RX ORDER — CYANOCOBALAMIN 1000 UG/ML
1000 INJECTION, SOLUTION INTRAMUSCULAR; SUBCUTANEOUS
Status: DISCONTINUED | OUTPATIENT
Start: 2024-05-24 | End: 2024-05-27 | Stop reason: HOSPADM

## 2024-05-24 RX ADMIN — PEGFILGRASTIM 6 MG: 6 INJECTION SUBCUTANEOUS at 13:46

## 2024-05-24 RX ADMIN — CYANOCOBALAMIN 1000 MCG: 1000 INJECTION, SOLUTION INTRAMUSCULAR at 13:46

## 2024-05-24 RX ADMIN — SODIUM CHLORIDE 1000 ML: 0.9 INJECTION, SOLUTION INTRAVENOUS at 13:45

## 2024-05-24 NOTE — PROGRESS NOTES
Patient arrived to unit without complaint. Patient had 5FU BRANDIE discontinued after 46 hours and 40 minutes, vitamin B12 IM injection to left deltoid, and Neulasta SC to left arm without incident. AVS declined and patient aware of next appointment on 5/31/24 at 10:00. Patient left in stable condition.

## 2024-05-24 NOTE — PRE-PROCEDURE INSTRUCTIONS
Pre-procedure Instructions for Interventional Radiology  [address] [address]  INTERVENTIONAL RADIOLOGY [phone]    You are scheduled for a/an Cryoablation of Liver Mass with possible Biopsy.    On Monday .    Your tentative arrival time is 0730.  [facility] will notify you the day before your procedure with the exact arrival time and the location to arrive.    To prepare for your procedure:  Please arrange for someone to drive you home after the procedure and stay with you until the next morning if you are instructed to do so.  This is typically for patients receiving some type of sedative or anesthetic for the procedure.  DO NOT EAT OR DRINK ANYTHING after midnight on the evening before your procedure including candy & gum.  ONLY SIPS OF WATER with your medications are allowed on the morning of your procedure.  TAKE ALL OF YOUR REGULAR MEDICATIONS THE MORNING OF YOUR PROCEDURE with sips of water!  We may call you to stop some of your blood sugar, blood pressure and blood thinning medications depending on the procedure.  Please take all of these medications unless we instruct you to stop them.  If you have an allergy to x-ray dye, please contact Interventional Radiology for an x-ray dye preparation which usually consists of an oral steroid and Benadryl.    The day of your procedure:  Bring a list of the medications you take at home.  Bring medications you take for breathing problems (such as inhalers), medications for chest pain, or both.  Bring a case for your glasses or contacts.  Bring your insurance card and a form of photo ID.  Please leave all valuables such as credit cards and jewelry at home.  Report to the admitting office to the left of the registration desk in the main lobby at the [facility], Entrance B.  You will then be directed to the Short Stay Center.  While your procedure is being performed, your family may wait in the Radiology Waiting Room  on the 1st floor in Radiology.  if they need to leave, they may provide a number to be called following the procedure.   Be prepared to stay overnight just in case. Sometimes procedures will indicate the need for further observation or treatment.   If you are scheduled for a follow-up visit with the Interventional Radiologist after your procedure, you will be called with a date and time.    Special Instructions (Medications to stop taking before your procedure etc.):  Hold  Losartan AM 6/3/24

## 2024-05-31 ENCOUNTER — HOSPITAL ENCOUNTER (OUTPATIENT)
Dept: INFUSION CENTER | Facility: CLINIC | Age: 69
End: 2024-05-31
Payer: MEDICARE

## 2024-05-31 ENCOUNTER — PATIENT OUTREACH (OUTPATIENT)
Dept: HEMATOLOGY ONCOLOGY | Facility: CLINIC | Age: 69
End: 2024-05-31

## 2024-05-31 VITALS — DIASTOLIC BLOOD PRESSURE: 83 MMHG | RESPIRATION RATE: 20 BRPM | HEART RATE: 105 BPM | SYSTOLIC BLOOD PRESSURE: 135 MMHG

## 2024-05-31 DIAGNOSIS — E53.8 VITAMIN B12 DEFICIENCY: ICD-10-CM

## 2024-05-31 DIAGNOSIS — C78.7 METASTATIC COLON CANCER TO LIVER (HCC): Primary | ICD-10-CM

## 2024-05-31 DIAGNOSIS — C18.9 METASTATIC COLON CANCER TO LIVER (HCC): Primary | ICD-10-CM

## 2024-05-31 LAB
ALBUMIN SERPL BCP-MCNC: 3.7 G/DL (ref 3.5–5)
ALP SERPL-CCNC: 174 U/L (ref 34–104)
ALT SERPL W P-5'-P-CCNC: 29 U/L (ref 7–52)
ANION GAP SERPL CALCULATED.3IONS-SCNC: 6 MMOL/L (ref 4–13)
AST SERPL W P-5'-P-CCNC: 36 U/L (ref 13–39)
BASOPHILS # BLD AUTO: 0.11 THOUSANDS/ÂΜL (ref 0–0.1)
BASOPHILS NFR BLD AUTO: 1 % (ref 0–1)
BILIRUB SERPL-MCNC: 0.35 MG/DL (ref 0.2–1)
BUN SERPL-MCNC: 17 MG/DL (ref 5–25)
CALCIUM SERPL-MCNC: 9.6 MG/DL (ref 8.4–10.2)
CHLORIDE SERPL-SCNC: 106 MMOL/L (ref 96–108)
CO2 SERPL-SCNC: 26 MMOL/L (ref 21–32)
CREAT SERPL-MCNC: 0.78 MG/DL (ref 0.6–1.3)
EOSINOPHIL # BLD AUTO: 0.46 THOUSAND/ÂΜL (ref 0–0.61)
EOSINOPHIL NFR BLD AUTO: 2 % (ref 0–6)
ERYTHROCYTE [DISTWIDTH] IN BLOOD BY AUTOMATED COUNT: 18.2 % (ref 11.6–15.1)
GFR SERPL CREATININE-BSD FRML MDRD: 78 ML/MIN/1.73SQ M
GLUCOSE SERPL-MCNC: 103 MG/DL (ref 65–140)
HCT VFR BLD AUTO: 29.3 % (ref 34.8–46.1)
HGB BLD-MCNC: 9.5 G/DL (ref 11.5–15.4)
IMM GRANULOCYTES # BLD AUTO: 0.24 THOUSAND/UL (ref 0–0.2)
IMM GRANULOCYTES NFR BLD AUTO: 1 % (ref 0–2)
LYMPHOCYTES # BLD AUTO: 2.76 THOUSANDS/ÂΜL (ref 0.6–4.47)
LYMPHOCYTES NFR BLD AUTO: 14 % (ref 14–44)
MCH RBC QN AUTO: 29.8 PG (ref 26.8–34.3)
MCHC RBC AUTO-ENTMCNC: 32.4 G/DL (ref 31.4–37.4)
MCV RBC AUTO: 92 FL (ref 82–98)
MONOCYTES # BLD AUTO: 1.88 THOUSAND/ÂΜL (ref 0.17–1.22)
MONOCYTES NFR BLD AUTO: 9 % (ref 4–12)
NEUTROPHILS # BLD AUTO: 14.68 THOUSANDS/ÂΜL (ref 1.85–7.62)
NEUTS SEG NFR BLD AUTO: 73 % (ref 43–75)
NRBC BLD AUTO-RTO: 0 /100 WBCS
PLATELET # BLD AUTO: 271 THOUSANDS/UL (ref 149–390)
PMV BLD AUTO: 10.5 FL (ref 8.9–12.7)
POTASSIUM SERPL-SCNC: 4.2 MMOL/L (ref 3.5–5.3)
PROT SERPL-MCNC: 7.9 G/DL (ref 6.4–8.4)
RBC # BLD AUTO: 3.19 MILLION/UL (ref 3.81–5.12)
SODIUM SERPL-SCNC: 138 MMOL/L (ref 135–147)
TSH SERPL DL<=0.05 MIU/L-ACNC: 1.93 UIU/ML (ref 0.45–4.5)
WBC # BLD AUTO: 20.13 THOUSAND/UL (ref 4.31–10.16)

## 2024-05-31 PROCEDURE — 85025 COMPLETE CBC W/AUTO DIFF WBC: CPT | Performed by: INTERNAL MEDICINE

## 2024-05-31 PROCEDURE — 80053 COMPREHEN METABOLIC PANEL: CPT | Performed by: INTERNAL MEDICINE

## 2024-05-31 PROCEDURE — 84443 ASSAY THYROID STIM HORMONE: CPT | Performed by: INTERNAL MEDICINE

## 2024-05-31 NOTE — PROGRESS NOTES
Pt called stating she has an appointment at the [facility] [place] and she has not heard from  [place] for  a  time  E mail sent to  [facility], a LYFT was sent to the pt to take her to the infusion center.  She was thankful for the assistance

## 2024-05-31 NOTE — PROGRESS NOTES
Patient arrived to the unit for blood work. She reported feeling dizzy and mentioned that she had a nose bleed this am. Vitals are stable and pulse ox was 99. I notified Hanane Diehl that the patient was dizzy and had a nose bleed. A blood bank hold tube was drawn. Patient is aware to return on 6/4/24. Reviewed to call the office if it gets worse and to sit down if she feels dizzy.

## 2024-06-03 ENCOUNTER — ANESTHESIA EVENT (OUTPATIENT)
Dept: RADIOLOGY | Facility: HOSPITAL | Age: 69
End: 2024-06-03

## 2024-06-03 ENCOUNTER — HOSPITAL ENCOUNTER (OUTPATIENT)
Dept: RADIOLOGY | Facility: HOSPITAL | Age: 69
Discharge: HOME | End: 2024-06-03
Attending: RADIOLOGY
Payer: MEDICARE

## 2024-06-03 ENCOUNTER — ANESTHESIA (OUTPATIENT)
Dept: RADIOLOGY | Facility: HOSPITAL | Age: 69
End: 2024-06-03

## 2024-06-03 VITALS
HEART RATE: 95 BPM | TEMPERATURE: 97.1 F | DIASTOLIC BLOOD PRESSURE: 77 MMHG | RESPIRATION RATE: 17 BRPM | OXYGEN SATURATION: 98 % | BODY MASS INDEX: 29.73 KG/M2 | SYSTOLIC BLOOD PRESSURE: 134 MMHG | WEIGHT: 185 LBS | HEIGHT: 66 IN

## 2024-06-03 DIAGNOSIS — C78.7 METASTATIC COLON CANCER TO LIVER (HCC): ICD-10-CM

## 2024-06-03 DIAGNOSIS — C18.9 METASTATIC COLON CANCER TO LIVER (HCC): ICD-10-CM

## 2024-06-03 LAB
ALBUMIN SERPL BCP-MCNC: 3.4 G/DL (ref 3.5–5)
ALP SERPL-CCNC: 160 U/L (ref 34–104)
ALT SERPL W P-5'-P-CCNC: 30 U/L (ref 7–52)
ANION GAP SERPL CALCULATED.3IONS-SCNC: 6 MMOL/L (ref 4–13)
AST SERPL W P-5'-P-CCNC: 35 U/L (ref 13–39)
BASOPHILS # BLD AUTO: 0.04 THOUSANDS/ÂΜL (ref 0–0.1)
BASOPHILS NFR BLD AUTO: 0 % (ref 0–1)
BILIRUB SERPL-MCNC: 0.31 MG/DL (ref 0.2–1)
BUN SERPL-MCNC: 19 MG/DL (ref 5–25)
CALCIUM ALBUM COR SERPL-MCNC: 9.8 MG/DL (ref 8.3–10.1)
CALCIUM SERPL-MCNC: 9.3 MG/DL (ref 8.4–10.2)
CHLORIDE SERPL-SCNC: 104 MMOL/L (ref 96–108)
CO2 SERPL-SCNC: 28 MMOL/L (ref 21–32)
CREAT SERPL-MCNC: 0.71 MG/DL (ref 0.6–1.3)
EOSINOPHIL # BLD AUTO: 0.44 THOUSAND/ÂΜL (ref 0–0.61)
EOSINOPHIL NFR BLD AUTO: 4 % (ref 0–6)
ERYTHROCYTE [DISTWIDTH] IN BLOOD BY AUTOMATED COUNT: 18.2 % (ref 11.6–15.1)
GFR SERPL CREATININE-BSD FRML MDRD: 87 ML/MIN/1.73SQ M
GLUCOSE P FAST SERPL-MCNC: 101 MG/DL (ref 65–99)
GLUCOSE SERPL-MCNC: 101 MG/DL (ref 65–140)
HCT VFR BLD AUTO: 28.6 % (ref 34.8–46.1)
HGB BLD-MCNC: 9.1 G/DL (ref 11.5–15.4)
IMM GRANULOCYTES # BLD AUTO: 0.1 THOUSAND/UL (ref 0–0.2)
IMM GRANULOCYTES NFR BLD AUTO: 1 % (ref 0–2)
INR PPP: 1.09 (ref 0.84–1.19)
LYMPHOCYTES # BLD AUTO: 2.4 THOUSANDS/ÂΜL (ref 0.6–4.47)
LYMPHOCYTES NFR BLD AUTO: 21 % (ref 14–44)
MCH RBC QN AUTO: 29.2 PG (ref 26.8–34.3)
MCHC RBC AUTO-ENTMCNC: 31.8 G/DL (ref 31.4–37.4)
MCV RBC AUTO: 92 FL (ref 82–98)
MONOCYTES # BLD AUTO: 1.03 THOUSAND/ÂΜL (ref 0.17–1.22)
MONOCYTES NFR BLD AUTO: 9 % (ref 4–12)
NEUTROPHILS # BLD AUTO: 7.26 THOUSANDS/ÂΜL (ref 1.85–7.62)
NEUTS SEG NFR BLD AUTO: 65 % (ref 43–75)
NRBC BLD AUTO-RTO: 0 /100 WBCS
PLATELET # BLD AUTO: 195 THOUSANDS/UL (ref 149–390)
PMV BLD AUTO: 9.8 FL (ref 8.9–12.7)
POTASSIUM SERPL-SCNC: 4.1 MMOL/L (ref 3.5–5.3)
PROT SERPL-MCNC: 7.3 G/DL (ref 6.4–8.4)
PROTHROMBIN TIME: 14 SECONDS (ref 11.6–14.5)
RBC # BLD AUTO: 3.12 MILLION/UL (ref 3.81–5.12)
SODIUM SERPL-SCNC: 138 MMOL/L (ref 135–147)
WBC # BLD AUTO: 11.27 THOUSAND/UL (ref 4.31–10.16)

## 2024-06-03 PROCEDURE — C2618 PROBE/NEEDLE, CRYO: HCPCS

## 2024-06-03 PROCEDURE — 47383 PERQ ABLTJ LVR CRYOABLATION: CPT | Performed by: RADIOLOGY

## 2024-06-03 PROCEDURE — 77013 CT GUIDE FOR TISSUE ABLATION: CPT | Performed by: RADIOLOGY

## 2024-06-03 PROCEDURE — 88307 TISSUE EXAM BY PATHOLOGIST: CPT | Performed by: PATHOLOGY

## 2024-06-03 PROCEDURE — 80053 COMPREHEN METABOLIC PANEL: CPT | Performed by: RADIOLOGY

## 2024-06-03 PROCEDURE — 85025 COMPLETE CBC W/AUTO DIFF WBC: CPT | Performed by: RADIOLOGY

## 2024-06-03 PROCEDURE — 88342 IMHCHEM/IMCYTCHM 1ST ANTB: CPT | Performed by: PATHOLOGY

## 2024-06-03 PROCEDURE — 85610 PROTHROMBIN TIME: CPT | Performed by: RADIOLOGY

## 2024-06-03 PROCEDURE — 88341 IMHCHEM/IMCYTCHM EA ADD ANTB: CPT | Performed by: PATHOLOGY

## 2024-06-03 RX ORDER — EPHEDRINE SULFATE 50 MG/ML
INJECTION INTRAVENOUS AS NEEDED
Status: DISCONTINUED | OUTPATIENT
Start: 2024-06-03 | End: 2024-06-03

## 2024-06-03 RX ORDER — CEFAZOLIN SODIUM 2 G/50ML
2000 SOLUTION INTRAVENOUS ONCE
Status: COMPLETED | OUTPATIENT
Start: 2024-06-03 | End: 2024-06-03

## 2024-06-03 RX ORDER — ONDANSETRON 2 MG/ML
4 INJECTION INTRAMUSCULAR; INTRAVENOUS ONCE AS NEEDED
Status: DISCONTINUED | OUTPATIENT
Start: 2024-06-03 | End: 2024-06-03 | Stop reason: HOSPADM

## 2024-06-03 RX ORDER — SUCCINYLCHOLINE/SOD CL,ISO/PF 100 MG/5ML
SYRINGE (ML) INTRAVENOUS AS NEEDED
Status: DISCONTINUED | OUTPATIENT
Start: 2024-06-03 | End: 2024-06-03

## 2024-06-03 RX ORDER — SODIUM CHLORIDE, SODIUM LACTATE, POTASSIUM CHLORIDE, CALCIUM CHLORIDE 600; 310; 30; 20 MG/100ML; MG/100ML; MG/100ML; MG/100ML
75 INJECTION, SOLUTION INTRAVENOUS CONTINUOUS
Status: DISCONTINUED | OUTPATIENT
Start: 2024-06-03 | End: 2024-06-04 | Stop reason: HOSPADM

## 2024-06-03 RX ORDER — METOCLOPRAMIDE HYDROCHLORIDE 5 MG/ML
10 INJECTION INTRAMUSCULAR; INTRAVENOUS ONCE AS NEEDED
Status: DISCONTINUED | OUTPATIENT
Start: 2024-06-03 | End: 2024-06-03 | Stop reason: HOSPADM

## 2024-06-03 RX ORDER — FENTANYL CITRATE/PF 50 MCG/ML
25 SYRINGE (ML) INJECTION
Status: DISCONTINUED | OUTPATIENT
Start: 2024-06-03 | End: 2024-06-03 | Stop reason: HOSPADM

## 2024-06-03 RX ORDER — ROCURONIUM BROMIDE 10 MG/ML
INJECTION, SOLUTION INTRAVENOUS AS NEEDED
Status: DISCONTINUED | OUTPATIENT
Start: 2024-06-03 | End: 2024-06-03

## 2024-06-03 RX ORDER — FENTANYL CITRATE 50 UG/ML
INJECTION, SOLUTION INTRAMUSCULAR; INTRAVENOUS AS NEEDED
Status: DISCONTINUED | OUTPATIENT
Start: 2024-06-03 | End: 2024-06-03

## 2024-06-03 RX ORDER — LIDOCAINE HCL/PF 100 MG/5ML
SYRINGE (ML) INJECTION AS NEEDED
Status: DISCONTINUED | OUTPATIENT
Start: 2024-06-03 | End: 2024-06-03

## 2024-06-03 RX ORDER — METRONIDAZOLE 500 MG/100ML
500 INJECTION, SOLUTION INTRAVENOUS ONCE
Status: COMPLETED | OUTPATIENT
Start: 2024-06-03 | End: 2024-06-03

## 2024-06-03 RX ORDER — SODIUM CHLORIDE 9 MG/ML
30 INJECTION, SOLUTION INTRAVENOUS CONTINUOUS
Status: DISCONTINUED | OUTPATIENT
Start: 2024-06-03 | End: 2024-06-04 | Stop reason: HOSPADM

## 2024-06-03 RX ORDER — OXYCODONE HYDROCHLORIDE 5 MG/1
5 TABLET ORAL EVERY 4 HOURS PRN
Status: DISCONTINUED | OUTPATIENT
Start: 2024-06-03 | End: 2024-06-04 | Stop reason: HOSPADM

## 2024-06-03 RX ORDER — LIDOCAINE WITH 8.4% SOD BICARB 0.9%(10ML)
SYRINGE (ML) INJECTION AS NEEDED
Status: COMPLETED | OUTPATIENT
Start: 2024-06-03 | End: 2024-06-03

## 2024-06-03 RX ORDER — ONDANSETRON 2 MG/ML
INJECTION INTRAMUSCULAR; INTRAVENOUS AS NEEDED
Status: DISCONTINUED | OUTPATIENT
Start: 2024-06-03 | End: 2024-06-03

## 2024-06-03 RX ORDER — PROPOFOL 10 MG/ML
INJECTION, EMULSION INTRAVENOUS AS NEEDED
Status: DISCONTINUED | OUTPATIENT
Start: 2024-06-03 | End: 2024-06-03

## 2024-06-03 RX ORDER — ACETAMINOPHEN 325 MG/1
650 TABLET ORAL EVERY 4 HOURS PRN
Status: DISCONTINUED | OUTPATIENT
Start: 2024-06-03 | End: 2024-06-04 | Stop reason: HOSPADM

## 2024-06-03 RX ADMIN — PROPOFOL 170 MG: 10 INJECTION, EMULSION INTRAVENOUS at 09:42

## 2024-06-03 RX ADMIN — Medication 100 MG: at 09:42

## 2024-06-03 RX ADMIN — SODIUM CHLORIDE: 0.9 INJECTION, SOLUTION INTRAVENOUS at 09:40

## 2024-06-03 RX ADMIN — EPHEDRINE SULFATE 10 MG: 50 INJECTION, SOLUTION INTRAVENOUS at 10:13

## 2024-06-03 RX ADMIN — SODIUM CHLORIDE 30 ML/HR: 0.9 INJECTION, SOLUTION INTRAVENOUS at 08:29

## 2024-06-03 RX ADMIN — PHENYLEPHRINE HYDROCHLORIDE 200 MCG: 10 INJECTION INTRAVENOUS at 09:49

## 2024-06-03 RX ADMIN — FENTANYL CITRATE 50 MCG: 50 INJECTION INTRAMUSCULAR; INTRAVENOUS at 12:48

## 2024-06-03 RX ADMIN — ROCURONIUM BROMIDE 30 MG: 10 INJECTION, SOLUTION INTRAVENOUS at 10:52

## 2024-06-03 RX ADMIN — PHENYLEPHRINE HYDROCHLORIDE 100 MCG: 10 INJECTION INTRAVENOUS at 09:42

## 2024-06-03 RX ADMIN — Medication 10 ML: at 10:33

## 2024-06-03 RX ADMIN — PHENYLEPHRINE HYDROCHLORIDE 200 MCG: 10 INJECTION INTRAVENOUS at 10:13

## 2024-06-03 RX ADMIN — ONDANSETRON 4 MG: 2 INJECTION INTRAMUSCULAR; INTRAVENOUS at 13:05

## 2024-06-03 RX ADMIN — LIDOCAINE HYDROCHLORIDE 60 MG: 20 INJECTION INTRAVENOUS at 09:42

## 2024-06-03 RX ADMIN — ROCURONIUM BROMIDE 50 MG: 10 INJECTION, SOLUTION INTRAVENOUS at 11:28

## 2024-06-03 RX ADMIN — FENTANYL CITRATE 50 MCG: 50 INJECTION INTRAMUSCULAR; INTRAVENOUS at 12:04

## 2024-06-03 RX ADMIN — CEFAZOLIN SODIUM 2000 MG: 2 SOLUTION INTRAVENOUS at 10:01

## 2024-06-03 RX ADMIN — METRONIDAZOLE 500 MG: 500 INJECTION, SOLUTION INTRAVENOUS at 10:01

## 2024-06-03 RX ADMIN — SODIUM CHLORIDE: 0.9 INJECTION, SOLUTION INTRAVENOUS at 11:51

## 2024-06-03 RX ADMIN — SUGAMMADEX 200 MG: 100 INJECTION, SOLUTION INTRAVENOUS at 12:55

## 2024-06-03 RX ADMIN — ROCURONIUM BROMIDE 20 MG: 10 INJECTION, SOLUTION INTRAVENOUS at 09:54

## 2024-06-03 RX ADMIN — ROCURONIUM BROMIDE 30 MG: 10 INJECTION, SOLUTION INTRAVENOUS at 09:46

## 2024-06-03 NOTE — ANESTHESIA PREPROCEDURE EVALUATION
Procedure:  IR CRYOABLATION    2019 - breast cancer  2023 - metastatic colon adenocarcinoma  2023 - squamous cell cancer or right tonsil    Currently on systemic chemo  Liver metastases present - for ablation    Relevant Problems   CARDIO   (+) Mixed hyperlipidemia   (+) Primary hypertension      GI/HEPATIC   (+) GERD (gastroesophageal reflux disease)   (+) Metastatic colon cancer to liver (HCC)      /RENAL   (+) Stage 3a chronic kidney disease (HCC)      GYN   (+) Malignant neoplasm of right female breast (HCC)      HEMATOLOGY   (+) Nutritional anemia   (+) Thrombocytopenia (HCC)      Neurology/Sleep   (+) History of CVA (cerebrovascular accident)      Oncology   (+) 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HCC)      Other   (+) Prediabetes     EKG 2023   ST     TTE 10/2023   Left Ventricle: Left ventricular cavity size is normal. Wall thickness is mildly increased. The left ventricular ejection fraction is 75%. Systolic function is hyperdynamic. Wall motion is normal. Diastolic function is mildly abnormal, consistent with grade I (abnormal) relaxation.     Physical Exam    Airway    Mallampati score: III  TM Distance: >3 FB  Neck ROM: full     Dental        Cardiovascular      Pulmonary      Other Findings  post-pubertal.      Anesthesia Plan  ASA Score- 3     Anesthesia Type- general with ASA Monitors.         Additional Monitors:     Airway Plan: ETT.    Comment: Recent labs personally reviewed:  Lab Results       Component                Value               Date                       WBC                      11.27 (H)           06/03/2024                 HGB                      9.1 (L)             06/03/2024                 PLT                      195                 06/03/2024            Lab Results       Component                Value               Date                       K                        4.1                 06/03/2024                 BUN                      19                   06/03/2024                 CREATININE               0.71                06/03/2024                 GLUCOSE                  209 (H)             03/12/2024            Lab Results       Component                Value               Date                       PTT                      31                  02/21/2024             Lab Results       Component                Value               Date                       INR                      1.09                06/03/2024              Blood type O      I, Marielle Dickerson MD, have personally seen and evaluated the patient prior to anesthetic care.  I have reviewed the pre-anesthetic record, medical history, allergies, medications and any other medical records if appropriate to the anesthetic care.  If a CRNA is involved in the case, I have reviewed the CRNA assessment, if present, and agree. I consented the patient for general anesthesia with appropriate airway support as indicated. We reviewed the risks associated including PONV, sore throat, allergic reaction to anesthetics and management plan to address these issues. We discussed the indication and risks associated with any invasive monitors that would be placed. We discussed post op pain control and expectations. We discussed rare complications including hypoxia, perioperative cardiac and neurologic events, and death based on the patient's baseline risk. All questions and concerns were addressed.   .       Plan Factors-Exercise tolerance (METS): >4 METS.    Chart reviewed. EKG reviewed. Imaging results reviewed. Existing labs reviewed. Patient summary reviewed.    Patient is not a current smoker.  Patient did not smoke on day of surgery.    Obstructive sleep apnea risk education given perioperatively.        Induction- intravenous.    Postoperative Plan-     Perioperative Resuscitation Plan - Level 1 - Full Code.       Informed Consent- Anesthetic plan and risks discussed with patient.  I personally reviewed this patient  with the CRNA. Discussed and agreed on the Anesthesia Plan with the CRNA..

## 2024-06-03 NOTE — INTERVAL H&P NOTE
Update: (This section must be completed if the H&P was completed greater than 24 hrs to procedure or admission)    H&P reviewed. After examining the patient, I find no changed to the H&P since it had been written.    69-year-old female with breast cancer colon cancer and tonsil squamous cell cancer  Metastatic rectal cancer to the liver, excellent response in all lesions except for 1    PET/CT reviewed    After discussion at tumor board we believe we can perform ablation of this lesion  Local therapy indicated in the setting that this is the only lesion that has not responded     Proximity to bowel noted we will displace tissues as required  Risk of bowel injury discussed    risks benefits alternatives reviewed anesthesia support appreciated    Mp2 asa3    Patient re-evaluated. Accept as history and physical.    Rosi Hutchins MD/Evelin 3, 2024/9:16 AM

## 2024-06-03 NOTE — QUICK NOTE
IR Quick Note    Abdomen soft benign nondistended.  Small discomfort near the right flank puncture zone    Abdominal wall without area of numbness    Okay to begin diet    We will follow-up with MRI and clinic visit    OK to dc home after bedrest

## 2024-06-03 NOTE — BRIEF OP NOTE (RAD/CATH)
INTERVENTIONAL RADIOLOGY PROCEDURE NOTE    Date: 6/3/2024    Procedure:   Procedure Summary       Date: 06/03/24 Room / Location: [facility] CAT Scan    Anesthesia Start: 0940 Anesthesia Stop:     Procedure: IR CRYOABLATION Diagnosis:       Metastatic colon cancer to liver (HCC)      (liver mass)    Scheduled Providers: Rosi Hutchins MD; Marielle Dickerson MD Responsible Provider: Marielle Dickerson MD    Anesthesia Type: general ASA Status: 3            Preoperative diagnosis:   1. Metastatic colon cancer to liver (HCC)         Postoperative diagnosis: Same.    Surgeon: Rosi Hutchins MD     Assistant: None. No qualified resident was available.    Blood loss: 0    Antibiotics: Ancef Flagyn     Specimens: 18g core x3      Findings:     R liver mass has enlarged since prior imaging    Biopsy performed    Cryoablation, 3 needles  2 x 2.1mm ICEFORCE  1 x 2.1mm ICEPEARL    Dissection with saline and CO2  Gelfoam and tract cautery for hemostasis    Complications: None immediate.    Anesthesia: general

## 2024-06-03 NOTE — ANESTHESIA POSTPROCEDURE EVALUATION
Post-Op Assessment Note    CV Status:  Stable    Pain management: adequate       Mental Status:  Alert and awake   Hydration Status:  Euvolemic   PONV Controlled:  Controlled   Airway Patency:  Patent     Post Op Vitals Reviewed: Yes    No anethesia notable event occurred.    Staff: Anesthesiologist, CRNA               BP   133/79   Temp 97   Pulse 67   Resp 12   SpO2 100

## 2024-06-04 ENCOUNTER — HOSPITAL ENCOUNTER (OUTPATIENT)
Dept: INFUSION CENTER | Facility: CLINIC | Age: 69
Discharge: HOME | End: 2024-06-04
Payer: MEDICARE

## 2024-06-04 VITALS
WEIGHT: 191.8 LBS | HEART RATE: 114 BPM | DIASTOLIC BLOOD PRESSURE: 68 MMHG | TEMPERATURE: 97 F | SYSTOLIC BLOOD PRESSURE: 100 MMHG | BODY MASS INDEX: 30.82 KG/M2 | RESPIRATION RATE: 18 BRPM | HEIGHT: 66 IN

## 2024-06-04 DIAGNOSIS — E53.8 VITAMIN B12 DEFICIENCY: Primary | ICD-10-CM

## 2024-06-04 DIAGNOSIS — C78.7 METASTATIC COLON CANCER TO LIVER (HCC): ICD-10-CM

## 2024-06-04 DIAGNOSIS — C18.9 METASTATIC COLON CANCER TO LIVER (HCC): ICD-10-CM

## 2024-06-04 RX ORDER — SODIUM CHLORIDE 9 MG/ML
20 INJECTION, SOLUTION INTRAVENOUS ONCE AS NEEDED
Status: DISCONTINUED | OUTPATIENT
Start: 2024-06-04 | End: 2024-06-07 | Stop reason: HOSPADM

## 2024-06-04 RX ORDER — FLUOROURACIL 50 MG/ML
320 INJECTION, SOLUTION INTRAVENOUS ONCE
Status: COMPLETED | OUTPATIENT
Start: 2024-06-04 | End: 2024-06-04

## 2024-06-04 RX ORDER — DEXTROSE MONOHYDRATE 50 MG/ML
20 INJECTION, SOLUTION INTRAVENOUS ONCE
Status: DISCONTINUED | OUTPATIENT
Start: 2024-06-04 | End: 2024-06-07 | Stop reason: HOSPADM

## 2024-06-04 RX ADMIN — LEUCOVORIN CALCIUM 800 MG: 500 INJECTION, POWDER, LYOPHILIZED, FOR SOLUTION INTRAMUSCULAR; INTRAVENOUS at 14:29

## 2024-06-04 RX ADMIN — SODIUM CHLORIDE 240 MG: 9 INJECTION, SOLUTION INTRAVENOUS at 13:49

## 2024-06-04 RX ADMIN — DEXAMETHASONE SODIUM PHOSPHATE: 100 INJECTION INTRAMUSCULAR; INTRAVENOUS at 13:12

## 2024-06-04 RX ADMIN — SODIUM CHLORIDE 20 ML/HR: 0.9 INJECTION, SOLUTION INTRAVENOUS at 13:10

## 2024-06-04 RX ADMIN — FLUOROURACIL 625 MG: 50 INJECTION, SOLUTION INTRAVENOUS at 15:09

## 2024-06-04 RX ADMIN — SODIUM CHLORIDE 1000 ML: 0.9 INJECTION, SOLUTION INTRAVENOUS at 13:10

## 2024-06-04 NOTE — PROGRESS NOTES
Pt arrives with no new complaints, states peripheral neuropathy is better since on gabapentin, tolerated opdivo/leucovorin/5fu, yellow BRANDIE 5FU connected to patient with white clamp open pt aware to return Thursday 6-6-24 1:30, AVS declined, STAR notified of that appt.

## 2024-06-05 DIAGNOSIS — E78.2 MIXED HYPERLIPIDEMIA: ICD-10-CM

## 2024-06-05 DIAGNOSIS — C78.7 METASTATIC COLON CANCER TO LIVER (HCC): ICD-10-CM

## 2024-06-05 DIAGNOSIS — C18.9 METASTATIC COLON CANCER TO LIVER (HCC): ICD-10-CM

## 2024-06-05 RX ORDER — ATORVASTATIN CALCIUM 40 MG/1
40 TABLET, FILM COATED ORAL
Qty: 30 TABLET | Refills: 2 | Status: SHIPPED | OUTPATIENT
Start: 2024-06-05

## 2024-06-05 RX ORDER — MIRTAZAPINE 30 MG/1
30 TABLET, FILM COATED ORAL
Qty: 30 TABLET | Refills: 3 | Status: SHIPPED | OUTPATIENT
Start: 2024-06-05

## 2024-06-05 RX ORDER — MIRTAZAPINE 15 MG/1
15 TABLET, FILM COATED ORAL
Qty: 30 TABLET | Refills: 3 | Status: SHIPPED | OUTPATIENT
Start: 2024-06-05

## 2024-06-05 NOTE — TELEPHONE ENCOUNTER
Rec'd call from patient asking for a refill of her mirtazepine and her atorvastatin. I explained that we will send in the refill for mirtazepine, but the atorvastatin needs to be filled by her pcp or cardio oncologist. Patient verbalized understanding.

## 2024-06-06 ENCOUNTER — APPOINTMENT (EMERGENCY)
Dept: CT IMAGING | Facility: HOSPITAL | Age: 69
End: 2024-06-06
Payer: MEDICARE

## 2024-06-06 ENCOUNTER — HOSPITAL ENCOUNTER (EMERGENCY)
Facility: HOSPITAL | Age: 69
Discharge: HOME | End: 2024-06-06
Attending: EMERGENCY MEDICINE
Payer: MEDICARE

## 2024-06-06 ENCOUNTER — PATIENT MESSAGE (OUTPATIENT)
Dept: HEMATOLOGY ONCOLOGY | Facility: CLINIC | Age: 69
End: 2024-06-06

## 2024-06-06 ENCOUNTER — HOSPITAL ENCOUNTER (OUTPATIENT)
Dept: INFUSION CENTER | Facility: CLINIC | Age: 69
Discharge: HOME | End: 2024-06-06
Payer: MEDICARE

## 2024-06-06 VITALS
RESPIRATION RATE: 18 BRPM | WEIGHT: 196.43 LBS | BODY MASS INDEX: 31.72 KG/M2 | DIASTOLIC BLOOD PRESSURE: 65 MMHG | HEART RATE: 79 BPM | TEMPERATURE: 98.6 F | SYSTOLIC BLOOD PRESSURE: 139 MMHG | OXYGEN SATURATION: 99 %

## 2024-06-06 VITALS — SYSTOLIC BLOOD PRESSURE: 128 MMHG | HEART RATE: 87 BPM | DIASTOLIC BLOOD PRESSURE: 85 MMHG | OXYGEN SATURATION: 100 %

## 2024-06-06 DIAGNOSIS — E53.8 VITAMIN B12 DEFICIENCY: Primary | ICD-10-CM

## 2024-06-06 DIAGNOSIS — D64.9 ANEMIA: ICD-10-CM

## 2024-06-06 DIAGNOSIS — R55 SYNCOPE: Primary | ICD-10-CM

## 2024-06-06 DIAGNOSIS — C18.9 METASTATIC COLON CANCER TO LIVER (HCC): ICD-10-CM

## 2024-06-06 DIAGNOSIS — C78.7 METASTATIC COLON CANCER TO LIVER (HCC): ICD-10-CM

## 2024-06-06 DIAGNOSIS — R79.89 ELEVATED LFTS: ICD-10-CM

## 2024-06-06 DIAGNOSIS — D69.6 THROMBOCYTOPENIA (HCC): ICD-10-CM

## 2024-06-06 LAB
ALBUMIN SERPL BCP-MCNC: 3.2 G/DL (ref 3.5–5)
ALP SERPL-CCNC: 131 U/L (ref 34–104)
ALT SERPL W P-5'-P-CCNC: 137 U/L (ref 7–52)
ANION GAP SERPL CALCULATED.3IONS-SCNC: 8 MMOL/L (ref 4–13)
AST SERPL W P-5'-P-CCNC: 89 U/L (ref 13–39)
ATRIAL RATE: 69 BPM
BASOPHILS # BLD AUTO: 0.04 THOUSANDS/ÂΜL (ref 0–0.1)
BASOPHILS NFR BLD AUTO: 0 % (ref 0–1)
BILIRUB SERPL-MCNC: 0.73 MG/DL (ref 0.2–1)
BUN SERPL-MCNC: 13 MG/DL (ref 5–25)
CALCIUM ALBUM COR SERPL-MCNC: 9.7 MG/DL (ref 8.3–10.1)
CALCIUM SERPL-MCNC: 9.1 MG/DL (ref 8.4–10.2)
CHLORIDE SERPL-SCNC: 105 MMOL/L (ref 96–108)
CO2 SERPL-SCNC: 26 MMOL/L (ref 21–32)
CREAT SERPL-MCNC: 0.62 MG/DL (ref 0.6–1.3)
EOSINOPHIL # BLD AUTO: 0.2 THOUSAND/ÂΜL (ref 0–0.61)
EOSINOPHIL NFR BLD AUTO: 2 % (ref 0–6)
ERYTHROCYTE [DISTWIDTH] IN BLOOD BY AUTOMATED COUNT: 18 % (ref 11.6–15.1)
GFR SERPL CREATININE-BSD FRML MDRD: 92 ML/MIN/1.73SQ M
GLUCOSE SERPL-MCNC: 116 MG/DL (ref 65–140)
GLUCOSE SERPL-MCNC: 99 MG/DL (ref 65–140)
HCT VFR BLD AUTO: 26.7 % (ref 34.8–46.1)
HGB BLD-MCNC: 8.5 G/DL (ref 11.5–15.4)
IMM GRANULOCYTES # BLD AUTO: 0.03 THOUSAND/UL (ref 0–0.2)
IMM GRANULOCYTES NFR BLD AUTO: 0 % (ref 0–2)
LYMPHOCYTES # BLD AUTO: 1.17 THOUSANDS/ÂΜL (ref 0.6–4.47)
LYMPHOCYTES NFR BLD AUTO: 13 % (ref 14–44)
MCH RBC QN AUTO: 28.6 PG (ref 26.8–34.3)
MCHC RBC AUTO-ENTMCNC: 31.8 G/DL (ref 31.4–37.4)
MCV RBC AUTO: 90 FL (ref 82–98)
MONOCYTES # BLD AUTO: 0.23 THOUSAND/ÂΜL (ref 0.17–1.22)
MONOCYTES NFR BLD AUTO: 3 % (ref 4–12)
NEUTROPHILS # BLD AUTO: 7.45 THOUSANDS/ÂΜL (ref 1.85–7.62)
NEUTS SEG NFR BLD AUTO: 82 % (ref 43–75)
NRBC BLD AUTO-RTO: 0 /100 WBCS
P AXIS: 61 DEGREES
PLATELET # BLD AUTO: 93 THOUSANDS/UL (ref 149–390)
PMV BLD AUTO: 10.8 FL (ref 8.9–12.7)
POTASSIUM SERPL-SCNC: 3.8 MMOL/L (ref 3.5–5.3)
PR INTERVAL: 166 MS
PROT SERPL-MCNC: 7.1 G/DL (ref 6.4–8.4)
QRS AXIS: 47 DEGREES
QRSD INTERVAL: 80 MS
QT INTERVAL: 378 MS
QTC INTERVAL: 405 MS
RBC # BLD AUTO: 2.97 MILLION/UL (ref 3.81–5.12)
SODIUM SERPL-SCNC: 139 MMOL/L (ref 135–147)
T WAVE AXIS: 35 DEGREES
VENTRICULAR RATE: 69 BPM
WBC # BLD AUTO: 9.12 THOUSAND/UL (ref 4.31–10.16)

## 2024-06-06 PROCEDURE — 93010 ELECTROCARDIOGRAM REPORT: CPT

## 2024-06-06 PROCEDURE — 96365 THER/PROPH/DIAG IV INF INIT: CPT

## 2024-06-06 PROCEDURE — 36415 COLL VENOUS BLD VENIPUNCTURE: CPT

## 2024-06-06 PROCEDURE — 85025 COMPLETE CBC W/AUTO DIFF WBC: CPT

## 2024-06-06 PROCEDURE — 99284 EMERGENCY DEPT VISIT MOD MDM: CPT

## 2024-06-06 PROCEDURE — 93005 ELECTROCARDIOGRAM TRACING: CPT

## 2024-06-06 PROCEDURE — 99285 EMERGENCY DEPT VISIT HI MDM: CPT | Performed by: EMERGENCY MEDICINE

## 2024-06-06 PROCEDURE — 80053 COMPREHEN METABOLIC PANEL: CPT

## 2024-06-06 PROCEDURE — 96366 THER/PROPH/DIAG IV INF ADDON: CPT

## 2024-06-06 PROCEDURE — 82948 REAGENT STRIP/BLOOD GLUCOSE: CPT

## 2024-06-06 PROCEDURE — 74177 CT ABD & PELVIS W/CONTRAST: CPT

## 2024-06-06 RX ORDER — CYANOCOBALAMIN 1000 UG/ML
1000 INJECTION, SOLUTION INTRAMUSCULAR; SUBCUTANEOUS
Status: DISCONTINUED | OUTPATIENT
Start: 2024-06-06 | End: 2024-06-09 | Stop reason: HOSPADM

## 2024-06-06 RX ADMIN — SODIUM CHLORIDE, SODIUM LACTATE, POTASSIUM CHLORIDE, AND CALCIUM CHLORIDE 1000 ML: .6; .31; .03; .02 INJECTION, SOLUTION INTRAVENOUS at 15:40

## 2024-06-06 RX ADMIN — IOHEXOL 100 ML: 350 INJECTION, SOLUTION INTRAVENOUS at 17:52

## 2024-06-06 NOTE — ED ATTENDING ATTESTATION
6/6/2024  I, Anila Caba DO, saw and evaluated the patient. I have discussed the patient with the resident/non-physician practitioner and agree with the resident's/non-physician practitioner's findings, Plan of Care, and MDM as documented in the resident's/non-physician practitioner's note, except where noted. All available labs and Radiology studies were reviewed.  I was present for key portions of any procedure(s) performed by the resident/non-physician practitioner and I was immediately available to provide assistance.       At this point I agree with the current assessment done in the Emergency Department.  I have conducted an independent evaluation of this patient a history and physical is as follows:    70 yo F h/o metastatic ca presenting for evaluation of syncopal event.  She was at [facility] center to have chemo removed as she just completed her 48 hour chemo (gets every 2 weeks). She was sitting in waiting room in chair when she began to feel lightheaded/near syncopal and then had brief syncopal episode/slumped over in chair. She did not fall to the ground/strike her head. She reports she feels lightheaded frequently, but has not passed out like this before.   She has not eaten anything yet today    ROS positive for fatigue  Denies recent illness, fevers, CP, SOB, cough, abdominal pain, back pain, N/V/D/C, urinary sx, calf pain, swelling, focal numbness/weakness  Reports having the same chemo in the past and has had no issues  Lives at home with her daughter    Exam unremarkable  Nonfocal neuro exam, heart RRR, lungs CTA b/l, abd soft/NT, 3 small surgical incisions on abdominal wall RUQ C/D/I, no calf swelling/ttp    MDM: 70 yo F with syncopal event- EKG, CBC to r/o anemia, BMP to r/o GOGO/electrolyte abn    Labs with elevated LFTs and worsening anemia, will get CT A/P to eval any postprocedure bleeding/hematoma/other abn      ED Course         Critical Care Time  Procedures

## 2024-06-06 NOTE — DISCHARGE INSTRUCTIONS
You were seen in the emergency department for syncope (passing out).  Your workup showed a slight elevation in your liver enzymes likely due to your recent procedure.  The remainder of your workup did not show any concerning findings.  If you pass out again or have any new concerning symptoms please return to the emergency department.

## 2024-06-06 NOTE — PROGRESS NOTES
Patient in waiting room for chemo ball disconnect, tech found patient slumped over and unresponsive. Patient clammy, short of breath, eyes rolling back.  Patients BP/pulse stable, o2 sat was 53% and then up to 100% when 6 liters oxygen provided via nasal canula and patient immediately become responsive. 911 was called and patient taken to ED. Hanane Diehl RN was notified.  Patient's yellow BRANDIE appeared deflated and was disconnected, port flushed per protocol and remained accessed. B12, hydration and neulasta was not given. Patient taken by [facility] ambulance.

## 2024-06-06 NOTE — ED PROVIDER NOTES
History  Chief Complaint   Patient presents with    Dizziness     Pt reports that she was at infusion center for them to discontinue pump that was used for two days of chemo. Pt reports while she was sitting there she started to feel lightheaded and she passed out. Pt was sitting and denies hitting head. Pt denies CP, SOB, or pain anywhere.     69-year-old female with a history of metastatic colon cancer currently undergoing chemotherapy, hypertension, hyperlipidemia, CKD, and stroke who presents for evaluation after a syncopal episode.  The patient reports that she was at the infusion center in the waiting room when she began to feel lightheaded.  The patient was seated in a chair.  The patient then had a syncopal episode that was witnessed by the infusion center staff.  The patient felt clammy after the episode, but now states that she is feeling better.  The patient states that she has decreased appetite at baseline.  The patient denies associated fever, chest pain, palpitations, shortness of breath, cough, nausea, vomiting, diarrhea, hematochezia, melena, dysuria, hematuria, lower extremity edema, history of PE/DVT.        Prior to Admission Medications   Prescriptions Last Dose Informant Patient Reported? Taking?   Cyanocobalamin (Vitamin B-12) 1000 MCG SUBL   No No   Sig: Place 1 tablet (1,000 mcg total) under the tongue in the morning   anastrozole (ARIMIDEX) 1 mg tablet  Self No No   Sig: Take 1 tablet (1 mg total) by mouth daily   atorvastatin (LIPITOR) 40 mg tablet   No No   Sig: Take 1 tablet (40 mg total) by mouth daily at bedtime   docusate sodium (COLACE) 50 mg capsule  Self No No   Sig: Take 1 capsule (50 mg total) by mouth 2 (two) times a day   ergocalciferol (VITAMIN D2) 50,000 units  Self No No   Sig: Take 1 capsule (50,000 Units total) by mouth once a week   ferrous sulfate 325 (65 Fe) mg tablet  Self No No   Sig: Take 1 tablet (325 mg total) by mouth daily with breakfast   fluorouracil 4,680 mg  in CADD/Elastomeric Infusion Device   No No   Sig: Infuse 4,680 mg (1,200 mg/m2/day x 1.95 m2) into a catheter in a vein via infusion device over 46 hours for 2 days  Infusion planned for June 4, 2024.   folic acid (FOLVITE) 1 mg tablet  Self No No   Sig: Take 1 tablet (1 mg total) by mouth in the morning   gabapentin (NEURONTIN) 300 mg capsule   No No   Sig: Take 1 pills in the morning, 1 pill at lunch and 2 pills before bed   hydrocortisone 1 % ointment  Self Yes No   lidocaine-prilocaine (EMLA) cream  Self No No   Sig: Apply topically as needed for mild pain   losartan (COZAAR) 50 mg tablet  Self No No   Sig: Take 1 tablet (50 mg total) by mouth 2 (two) times a day   mirtazapine (REMERON) 15 mg tablet   No No   Sig: Take 1 tablet (15 mg total) by mouth daily at bedtime Patient is also on a 30 mg tablet, for a total dose of 45 mg   mirtazapine (REMERON) 30 mg tablet   No No   Sig: Take 1 tablet (30 mg total) by mouth daily at bedtime   omeprazole (PriLOSEC) 20 mg delayed release capsule  Self No No   Sig: Take 1 capsule (20 mg total) by mouth daily   ondansetron (ZOFRAN) 8 mg tablet  Self No No   Sig: Take 1 tablet (8 mg total) by mouth every 8 (eight) hours as needed for nausea or vomiting   potassium chloride (Klor-Con M20) 20 mEq tablet   No No   Sig: Take 1 tablet (20 mEq total) by mouth 2 (two) times a day   prochlorperazine (COMPAZINE) 10 mg tablet  Self No No   Sig: Take 1 tablet (10 mg total) by mouth every 6 (six) hours as needed for nausea or vomiting   pyridoxine (VITAMIN B6) 50 mg tablet   No No   Sig: Take 1 tablet (50 mg total) by mouth daily      Facility-Administered Medications: None       Past Medical History:   Diagnosis Date    Anemia     Arthritis     Body mass index (BMI) 40.0-44.9, adult (HCC)     Breast cancer (HCC) 12/17/2018    Cancer (HCC)     right breast, colon, liver, right tonsil    Cervical lymphadenopathy     CT neck on 3/30/2023- Large right level 2A lymphadenopathy as described  above and suspicious for neoplasm.  Correlation with histopathology is recommended.  Mild asymmetric prominence of the right palatine tonsil with otherwise no definitive nodular enhancing lesions identified along the course of the aerodigestive tract.     5/26/23- FNA of this node was nonrevealing for tissue etiology, but it d    Encounter for screening for HIV 07/07/2022    Follow-up examination 04/04/2023    GERD (gastroesophageal reflux disease)     Hyperlipidemia     Hypertension     Obesity     Stroke (HCC)     TIA     Stroke (HCC)     TIA     Transaminitis 09/22/2021    Vasomotor rhinitis     Refilled flonase today. Stopped taking since January 2022       Past Surgical History:   Procedure Laterality Date    BREAST BIOPSY Right 11/23/2018    us guided bx cancer    BREAST SURGERY      COLONOSCOPY      ESOPHAGOSCOPY N/A 07/20/2023    Procedure: ESOPHAGOSCOPY;  Surgeon: David Chapa MD;  [facility];  Service: ENT    HEMICOLOECTOMY W/ ANASTOMOSIS Right 3/12/2024    Procedure: RIGHT COLECTOMY WITH ROBOTICS;  Surgeon: Vickie Israel MD;  Location: [facility];  Service: Surgical Oncology    IR BIOPSY LIVER MASS  06/27/2023    IR CRYOABLATION  6/3/2024    IR PORT CHECK  01/03/2024    IR PORT PLACEMENT  07/28/2023    IR PORT STRIPPING  01/09/2024    LAPAROTOMY N/A 3/12/2024    Procedure: LAPAROTOMY EXPLORATORY W/ ROBOTICS;  Surgeon: Vickie Israel MD;  Location: [facility];  Service: Surgical Oncology    [facility] INCL FLUOR GDNCE DX W/CELL WASHG SPX N/A 07/20/2023    Procedure: BRONCHOSCOPY;  Surgeon: David Chapa MD;  [facility];  Service: ENT    [facility] LARYNGOSCOPY W/BIOPSY MICROSCOPE/TELESCOPE N/A 07/20/2023    Procedure: MICRODIRECT LARYNGOSCOPY WITH BIOPSY;  Surgeon: David Chapa MD;  [facility]: [facility];  Service: ENT    [facility] MASTECTOMY PARTIAL W/AXILLARY LYMPHADENECTOMY Right 12/20/2018    Procedure: ULTRASOUND LOCALIZED PARTIAL MASTECTOMY W/SENTINEL NODE BIOPSY POSS AXILLARY  DISSECTION;  Surgeon: Zahida Coronado MD;  Location:  MAIN OR;  Service: General    TUBAL LIGATION      US GUIDED BREAST BIOPSY RIGHT COMPLETE Right 11/23/2018    US GUIDED INJECTION FOR RESEARCH STUDY  12/20/2018    US GUIDED INJECTION FOR RESEARCH STUDY  12/07/2018    US GUIDED INJECTION FOR RESEARCH STUDY  05/03/2019    US GUIDED LYMPH NODE BIOPSY RIGHT  05/26/2023       Family History   Problem Relation Age of Onset    Colon cancer Mother 58    Hypertension Mother     Pancreatic cancer Father 60    Hypertension Daughter     Hypertension Son      I have reviewed and agree with the history as documented.    E-Cigarette/Vaping    E-Cigarette Use Never User      E-Cigarette/Vaping Substances    Nicotine No     THC No     CBD No     Flavoring No     Other No     Unknown No      Social History     Tobacco Use    Smoking status: Never     Passive exposure: Never    Smokeless tobacco: Never    Tobacco comments:     NO TOBACCO USE   Vaping Use    Vaping status: Never Used   Substance Use Topics    Alcohol use: Never    Drug use: Never        Review of Systems   Constitutional:  Negative for chills, diaphoresis and fever.   HENT:  Negative for congestion and sore throat.    Eyes:  Negative for pain and redness.   Respiratory:  Negative for cough and shortness of breath.    Cardiovascular:  Negative for chest pain, palpitations and leg swelling.   Gastrointestinal:  Negative for abdominal pain, diarrhea, nausea and vomiting.   Genitourinary:  Negative for dysuria, flank pain and hematuria.   Musculoskeletal:  Negative for arthralgias and myalgias.   Skin:  Negative for color change, pallor and rash.   Neurological:  Positive for syncope and light-headedness. Negative for dizziness, weakness, numbness and headaches.   All other systems reviewed and are negative.      Physical Exam  ED Triage Vitals   Temperature Pulse Respirations Blood Pressure SpO2   06/06/24 1635 06/06/24 1343 06/06/24 1343 06/06/24 1343 06/06/24  1343   98.6 °F (37 °C) 78 18 132/67 98 %      Temp Source Heart Rate Source Patient Position - Orthostatic VS BP Location FiO2 (%)   06/06/24 1343 06/06/24 1343 06/06/24 1343 06/06/24 1343 --   Oral Monitor Sitting Left arm       Pain Score       06/06/24 1343       No Pain             Orthostatic Vital Signs  Vitals:    06/06/24 1343 06/06/24 1635 06/06/24 1837   BP: 132/67 136/68 139/65   Pulse: 78 81 79   Patient Position - Orthostatic VS: Sitting Lying        Physical Exam  Vitals and nursing note reviewed.   Constitutional:       General: She is not in acute distress.     Appearance: Normal appearance. She is not ill-appearing, toxic-appearing or diaphoretic.   HENT:      Head: Normocephalic and atraumatic.      Nose: Nose normal. No congestion or rhinorrhea.      Mouth/Throat:      Mouth: Mucous membranes are moist.      Pharynx: Oropharynx is clear.   Eyes:      General: No scleral icterus.     Extraocular Movements: Extraocular movements intact.      Conjunctiva/sclera: Conjunctivae normal.      Pupils: Pupils are equal, round, and reactive to light.   Cardiovascular:      Rate and Rhythm: Normal rate and regular rhythm.      Pulses: Normal pulses.      Heart sounds: Normal heart sounds. No murmur heard.     No friction rub. No gallop.   Pulmonary:      Effort: Pulmonary effort is normal.      Breath sounds: Normal breath sounds. No wheezing, rhonchi or rales.   Abdominal:      General: Abdomen is flat.      Palpations: Abdomen is soft.      Tenderness: There is no abdominal tenderness. There is no right CVA tenderness, left CVA tenderness, guarding or rebound.   Musculoskeletal:         General: No swelling, tenderness, deformity or signs of injury. Normal range of motion.      Cervical back: Normal range of motion and neck supple. No rigidity or tenderness.      Right lower leg: No edema.      Left lower leg: No edema.   Lymphadenopathy:      Cervical: No cervical adenopathy.   Skin:     General: Skin is  warm and dry.      Capillary Refill: Capillary refill takes less than 2 seconds.      Coloration: Skin is not jaundiced or pale.      Findings: No bruising, erythema, lesion or rash.   Neurological:      General: No focal deficit present.      Mental Status: She is alert and oriented to person, place, and time.      Cranial Nerves: No cranial nerve deficit.      Sensory: No sensory deficit.      Motor: No weakness.         ED Medications  Medications   lactated ringers bolus 1,000 mL (0 mL Intravenous Stopped 6/6/24 1837)   iohexol (OMNIPAQUE) 350 MG/ML injection (MULTI-DOSE) 100 mL (100 mL Intravenous Given 6/6/24 1752)       Diagnostic Studies  Results Reviewed       Procedure Component Value Units Date/Time    CBC and differential [[id number]]  (Abnormal) Collected: 06/06/24 1540    Lab Status: Final result Specimen: Blood from Central Venous Line Updated: 06/06/24 1647     WBC 9.12 Thousand/uL      RBC 2.97 Million/uL      Hemoglobin 8.5 g/dL      Hematocrit 26.7 %      MCV 90 fL      MCH 28.6 pg      MCHC 31.8 g/dL      RDW 18.0 %      MPV 10.8 fL      Platelets 93 Thousands/uL      nRBC 0 /100 WBCs      Segmented % 82 %      Immature Grans % 0 %      Lymphocytes % 13 %      Monocytes % 3 %      Eosinophils Relative 2 %      Basophils Relative 0 %      Absolute Neutrophils 7.45 Thousands/µL      Absolute Immature Grans 0.03 Thousand/uL      Absolute Lymphocytes 1.17 Thousands/µL      Absolute Monocytes 0.23 Thousand/µL      Eosinophils Absolute 0.20 Thousand/µL      Basophils Absolute 0.04 Thousands/µL     Comprehensive metabolic panel [[id number]]  (Abnormal) Collected: 06/06/24 1540    Lab Status: Final result Specimen: Blood from Central Venous Line Updated: 06/06/24 1604     Sodium 139 mmol/L      Potassium 3.8 mmol/L      Chloride 105 mmol/L      CO2 26 mmol/L      ANION GAP 8 mmol/L      BUN 13 mg/dL      Creatinine 0.62 mg/dL      Glucose 99 mg/dL      Calcium 9.1 mg/dL      Corrected Calcium 9.7 mg/dL       AST 89 U/L       U/L      Alkaline Phosphatase 131 U/L      Total Protein 7.1 g/dL      Albumin 3.2 g/dL      Total Bilirubin 0.73 mg/dL      eGFR 92 ml/min/1.73sq m     Narrative:      National Kid[employer] Foundation guidelines for Chronic Kidney Disease (CKD):     Stage 1 with normal or high GFR (GFR > 90 mL/min/1.73 square meters)    Stage 2 Mild CKD (GFR = 60-89 mL/min/1.73 square meters)    Stage 3A Moderate CKD (GFR = 45-59 mL/min/1.73 square meters)    Stage 3B Moderate CKD (GFR = 30-44 mL/min/1.73 square meters)    Stage 4 Severe CKD (GFR = 15-29 mL/min/1.73 square meters)    Stage 5 End Stage CKD (GFR <15 mL/min/1.73 square meters)  Note: GFR calculation is accurate only with a steady state creatinine                   CT abdomen pelvis with contrast   Final Result by Alexandru Helm MD (06/06 1813)      1. Few punctate foci of free air and mesenteric inflammation adjacent to the free margin of the right lobe of the liver consistent with postsurgical change from cryoablation of right hepatic lesion. No evidence of fluid collection or hematoma. No biliary    obstruction.   3. Remaining liver lesions are stable.         Workstation performed: [id number]               Procedures  Procedures      ED Course                                       Medical Decision Making  69-year-old female with a history of metastatic colon cancer currently undergoing chemotherapy, hypertension, hyperlipidemia, CKD, and stroke who presents for evaluation after a syncopal episode.  Vitals are within the normal limits.  Physical exam is unremarkable.  Differential diagnosis includes vasovagal syncope, anemia, electrolyte abnormality, dehydration.  Doubt PE given the patient's normal vital signs and lack of symptoms at this time.  Will order EKG, CBC, CMP.    EKG rate 69, sinus rhythm, normal axis, normal intervals, no ST elevation or depression.  The patient is anemic with a hemoglobin of 8.5 which is mildly decreased  from 9.1 three days ago.  Patient is additionally thrombocytopenic with platelet count of 93.  The patient has a new transaminitis likely secondary to recent cryoablation of liver lesions.  Elevated alk phos which is stable from prior.  Given worsening anemia and transaminitis will order CT of the abdomen and pelvis.  CT shows expected postoperative changes but no other acute abnormalities.  The patient reports feeling much better after receiving IV fluids.  Based on the patient's symptoms and workup suspect benign cause of syncope.  Return precautions are given and the patient is discharged.    Amount and/or Complexity of Data Reviewed  Labs: ordered.  Radiology: ordered.    Risk  Prescription drug management.          Disposition  Final diagnoses:   Syncope   Elevated LFTs   Anemia   Thrombocytopenia (HCC)     Time reflects when diagnosis was documented in both MDM as applicable and the Disposition within this note       Time User Action Codes Description Comment    6/6/2024  5:17 PM Anila Caba Add [R55] Syncope     6/6/2024  5:17 PM Anila Caba Add [R79.89] Elevated LFTs     6/6/2024  5:17 PM Anila Caba Add [D64.9] Anemia     6/6/2024  5:18 PM Anila Caba Add [D69.6] Thrombocytopenia (HCC)           ED Disposition       ED Disposition   Discharge    Condition   Stable    Date/Time   Thu Jun 6, 2024  6:22 PM    Comment   Maira Moura discharge to home/self care.                   Follow-up Information       Follow up With Specialties Details Why Contact Info Additional Information    [facility] Emergency Department Emergency Medicine Go to  If symptoms worsen [address] [id number]  [phone] [facility] Emergency Department, [address], [place], [address]            Discharge Medication List as of 6/6/2024  6:23 PM        CONTINUE these medications which have NOT CHANGED    Details   anastrozole (ARIMIDEX) 1 mg tablet Take 1  tablet (1 mg total) by mouth daily, Starting Fri 4/12/2024, Normal      atorvastatin (LIPITOR) 40 mg tablet Take 1 tablet (40 mg total) by mouth daily at bedtime, Starting Wed 6/5/2024, Normal      Cyanocobalamin (Vitamin B-12) 1000 MCG SUBL Place 1 tablet (1,000 mcg total) under the tongue in the morning, Starting Wed 5/8/2024, Normal      docusate sodium (COLACE) 50 mg capsule Take 1 capsule (50 mg total) by mouth 2 (two) times a day, Starting Fri 10/6/2023, Normal      ergocalciferol (VITAMIN D2) 50,000 units Take 1 capsule (50,000 Units total) by mouth once a week, Starting Wed 11/22/2023, Normal      ferrous sulfate 325 (65 Fe) mg tablet Take 1 tablet (325 mg total) by mouth daily with breakfast, Starting Mon 4/1/2024, Normal      fluorouracil 4,680 mg in CADD/Elastomeric Infusion Device Infuse 4,680 mg (1,200 mg/m2/day x 1.95 m2) into a catheter in a vein via infusion device over 46 hours for 2 days  Infusion planned for June 4, 2024., Starting Tue 6/4/2024, Until Thu 6/6/2024, Normal      folic acid (FOLVITE) 1 mg tablet Take 1 tablet (1 mg total) by mouth in the morning, Starting Wed 11/22/2023, Normal      gabapentin (NEURONTIN) 300 mg capsule Take 1 pills in the morning, 1 pill at lunch and 2 pills before bed, Normal      hydrocortisone 1 % ointment Historical Med      lidocaine-prilocaine (EMLA) cream Apply topically as needed for mild pain, Starting Tue 12/12/2023, Normal      losartan (COZAAR) 50 mg tablet Take 1 tablet (50 mg total) by mouth 2 (two) times a day, Starting Mon 4/1/2024, Normal      !! mirtazapine (REMERON) 15 mg tablet Take 1 tablet (15 mg total) by mouth daily at bedtime Patient is also on a 30 mg tablet, for a total dose of 45 mg, Starting Wed 6/5/2024, Normal      !! mirtazapine (REMERON) 30 mg tablet Take 1 tablet (30 mg total) by mouth daily at bedtime, Starting Wed 6/5/2024, Normal      omeprazole (PriLOSEC) 20 mg delayed release capsule Take 1 capsule (20 mg total) by mouth daily,  Starting Wed 1/24/2024, Normal      ondansetron (ZOFRAN) 8 mg tablet Take 1 tablet (8 mg total) by mouth every 8 (eight) hours as needed for nausea or vomiting, Starting Wed 1/24/2024, Normal      potassium chloride (Klor-Con M20) 20 mEq tablet Take 1 tablet (20 mEq total) by mouth 2 (two) times a day, Starting Wed 5/15/2024, Normal      prochlorperazine (COMPAZINE) 10 mg tablet Take 1 tablet (10 mg total) by mouth every 6 (six) hours as needed for nausea or vomiting, Starting Wed 1/24/2024, Normal      pyridoxine (VITAMIN B6) 50 mg tablet Take 1 tablet (50 mg total) by mouth daily, Starting Wed 5/15/2024, Normal       !! - Potential duplicate medications found. Please discuss with provider.        No discharge procedures on file.    PDMP Review         Value Time User    PDMP Reviewed  Yes 10/24/2023 12:24 PM Ariadne Fontana MD             ED Provider  Attending physically available and evaluated Maira Moura. I managed the patient along with the ED Attending.    Electronically Signed by           Bry Obrien DO  06/06/24 2891

## 2024-06-10 ENCOUNTER — TELEPHONE (OUTPATIENT)
Dept: HEMATOLOGY ONCOLOGY | Facility: CLINIC | Age: 69
End: 2024-06-10

## 2024-06-10 NOTE — TELEPHONE ENCOUNTER
Lm on vm advising appt is now 6/28/24 at 10:40 in the [place] office with Dr. Medina. If this appointment date/time does not work with your schedule please call our office at [phone] or feel free to send us a [employer] message.

## 2024-06-13 DIAGNOSIS — C78.7 METASTATIC COLON CANCER TO LIVER (HCC): Primary | ICD-10-CM

## 2024-06-13 DIAGNOSIS — E53.8 VITAMIN B12 DEFICIENCY: ICD-10-CM

## 2024-06-13 DIAGNOSIS — C18.9 METASTATIC COLON CANCER TO LIVER (HCC): Primary | ICD-10-CM

## 2024-06-17 ENCOUNTER — HOSPITAL ENCOUNTER (OUTPATIENT)
Dept: INFUSION CENTER | Facility: CLINIC | Age: 69
Discharge: HOME | End: 2024-06-17
Payer: MEDICARE

## 2024-06-17 DIAGNOSIS — E53.8 VITAMIN B12 DEFICIENCY: ICD-10-CM

## 2024-06-17 DIAGNOSIS — C78.7 METASTATIC COLON CANCER TO LIVER (HCC): Primary | ICD-10-CM

## 2024-06-17 DIAGNOSIS — C18.9 METASTATIC COLON CANCER TO LIVER (HCC): Primary | ICD-10-CM

## 2024-06-17 LAB
ALBUMIN SERPL BCP-MCNC: 3.3 G/DL (ref 3.5–5)
ALP SERPL-CCNC: 144 U/L (ref 34–104)
ALT SERPL W P-5'-P-CCNC: 39 U/L (ref 7–52)
ANION GAP SERPL CALCULATED.3IONS-SCNC: 6 MMOL/L (ref 4–13)
AST SERPL W P-5'-P-CCNC: 39 U/L (ref 13–39)
BASOPHILS # BLD AUTO: 0.02 THOUSANDS/ÂΜL (ref 0–0.1)
BASOPHILS NFR BLD AUTO: 0 % (ref 0–1)
BILIRUB SERPL-MCNC: 0.35 MG/DL (ref 0.2–1)
BUN SERPL-MCNC: 13 MG/DL (ref 5–25)
CALCIUM ALBUM COR SERPL-MCNC: 9.7 MG/DL (ref 8.3–10.1)
CALCIUM SERPL-MCNC: 9.1 MG/DL (ref 8.4–10.2)
CHLORIDE SERPL-SCNC: 108 MMOL/L (ref 96–108)
CO2 SERPL-SCNC: 24 MMOL/L (ref 21–32)
CREAT SERPL-MCNC: 0.72 MG/DL (ref 0.6–1.3)
EOSINOPHIL # BLD AUTO: 0.2 THOUSAND/ÂΜL (ref 0–0.61)
EOSINOPHIL NFR BLD AUTO: 4 % (ref 0–6)
ERYTHROCYTE [DISTWIDTH] IN BLOOD BY AUTOMATED COUNT: 17.9 % (ref 11.6–15.1)
GFR SERPL CREATININE-BSD FRML MDRD: 86 ML/MIN/1.73SQ M
GLUCOSE SERPL-MCNC: 121 MG/DL (ref 65–140)
HCT VFR BLD AUTO: 25 % (ref 34.8–46.1)
HGB BLD-MCNC: 7.9 G/DL (ref 11.5–15.4)
IMM GRANULOCYTES # BLD AUTO: 0.01 THOUSAND/UL (ref 0–0.2)
IMM GRANULOCYTES NFR BLD AUTO: 0 % (ref 0–2)
LYMPHOCYTES # BLD AUTO: 1.23 THOUSANDS/ÂΜL (ref 0.6–4.47)
LYMPHOCYTES NFR BLD AUTO: 25 % (ref 14–44)
MCH RBC QN AUTO: 29 PG (ref 26.8–34.3)
MCHC RBC AUTO-ENTMCNC: 31.6 G/DL (ref 31.4–37.4)
MCV RBC AUTO: 92 FL (ref 82–98)
MONOCYTES # BLD AUTO: 0.48 THOUSAND/ÂΜL (ref 0.17–1.22)
MONOCYTES NFR BLD AUTO: 10 % (ref 4–12)
NEUTROPHILS # BLD AUTO: 3.04 THOUSANDS/ÂΜL (ref 1.85–7.62)
NEUTS SEG NFR BLD AUTO: 61 % (ref 43–75)
NRBC BLD AUTO-RTO: 0 /100 WBCS
PLATELET # BLD AUTO: 251 THOUSANDS/UL (ref 149–390)
PMV BLD AUTO: 9.7 FL (ref 8.9–12.7)
POTASSIUM SERPL-SCNC: 3.7 MMOL/L (ref 3.5–5.3)
PROT SERPL-MCNC: 7.6 G/DL (ref 6.4–8.4)
RBC # BLD AUTO: 2.72 MILLION/UL (ref 3.81–5.12)
SODIUM SERPL-SCNC: 138 MMOL/L (ref 135–147)
TSH SERPL DL<=0.05 MIU/L-ACNC: 1.65 UIU/ML (ref 0.45–4.5)
WBC # BLD AUTO: 4.98 THOUSAND/UL (ref 4.31–10.16)

## 2024-06-17 PROCEDURE — 80053 COMPREHEN METABOLIC PANEL: CPT | Performed by: INTERNAL MEDICINE

## 2024-06-17 PROCEDURE — 84443 ASSAY THYROID STIM HORMONE: CPT | Performed by: INTERNAL MEDICINE

## 2024-06-17 PROCEDURE — 85025 COMPLETE CBC W/AUTO DIFF WBC: CPT | Performed by: INTERNAL MEDICINE

## 2024-06-17 NOTE — PROGRESS NOTES
Pt presents for central labs. Port accessed, positive blood return noted, labs collected per protocol. Pt provided with AVS, aware of appt time tomorrow.

## 2024-06-18 ENCOUNTER — HOSPITAL ENCOUNTER (OUTPATIENT)
Dept: INFUSION CENTER | Facility: CLINIC | Age: 69
Discharge: HOME | End: 2024-06-18
Payer: MEDICARE

## 2024-06-18 VITALS
HEART RATE: 105 BPM | WEIGHT: 197.09 LBS | DIASTOLIC BLOOD PRESSURE: 74 MMHG | RESPIRATION RATE: 18 BRPM | BODY MASS INDEX: 31.68 KG/M2 | TEMPERATURE: 99.5 F | SYSTOLIC BLOOD PRESSURE: 105 MMHG | HEIGHT: 66 IN

## 2024-06-18 DIAGNOSIS — C18.9 METASTATIC COLON CANCER TO LIVER (HCC): ICD-10-CM

## 2024-06-18 DIAGNOSIS — E53.8 VITAMIN B12 DEFICIENCY: Primary | ICD-10-CM

## 2024-06-18 DIAGNOSIS — C78.7 METASTATIC COLON CANCER TO LIVER (HCC): ICD-10-CM

## 2024-06-18 RX ORDER — SODIUM CHLORIDE 9 MG/ML
20 INJECTION, SOLUTION INTRAVENOUS ONCE AS NEEDED
Status: DISCONTINUED | OUTPATIENT
Start: 2024-06-18 | End: 2024-06-21 | Stop reason: HOSPADM

## 2024-06-18 RX ORDER — DEXTROSE MONOHYDRATE 50 MG/ML
20 INJECTION, SOLUTION INTRAVENOUS ONCE
Status: COMPLETED | OUTPATIENT
Start: 2024-06-18 | End: 2024-06-18

## 2024-06-18 RX ORDER — FLUOROURACIL 50 MG/ML
320 INJECTION, SOLUTION INTRAVENOUS ONCE
Status: COMPLETED | OUTPATIENT
Start: 2024-06-18 | End: 2024-06-18

## 2024-06-18 RX ADMIN — DEXAMETHASONE SODIUM PHOSPHATE: 10 INJECTION, SOLUTION INTRAMUSCULAR; INTRAVENOUS at 12:37

## 2024-06-18 RX ADMIN — DEXTROSE 20 ML/HR: 5 SOLUTION INTRAVENOUS at 13:50

## 2024-06-18 RX ADMIN — LEUCOVORIN CALCIUM 800 MG: 500 INJECTION, POWDER, LYOPHILIZED, FOR SOLUTION INTRAMUSCULAR; INTRAVENOUS at 14:01

## 2024-06-18 RX ADMIN — SODIUM CHLORIDE 1000 ML: 0.9 INJECTION, SOLUTION INTRAVENOUS at 12:30

## 2024-06-18 RX ADMIN — SODIUM CHLORIDE 240 MG: 9 INJECTION, SOLUTION INTRAVENOUS at 13:19

## 2024-06-18 RX ADMIN — FLUOROURACIL 625 MG: 50 INJECTION, SOLUTION INTRAVENOUS at 14:36

## 2024-06-18 NOTE — PROGRESS NOTES
Maira Moura  tolerated treatment well with no complications.      Maira Moura is aware of future appt on 6/20/24  at 1300 .

## 2024-06-20 ENCOUNTER — HOSPITAL ENCOUNTER (OUTPATIENT)
Dept: INFUSION CENTER | Facility: CLINIC | Age: 69
Discharge: HOME | End: 2024-06-20
Payer: MEDICARE

## 2024-06-20 VITALS
SYSTOLIC BLOOD PRESSURE: 101 MMHG | TEMPERATURE: 97.1 F | DIASTOLIC BLOOD PRESSURE: 70 MMHG | HEART RATE: 101 BPM | RESPIRATION RATE: 18 BRPM

## 2024-06-20 DIAGNOSIS — C78.7 METASTATIC COLON CANCER TO LIVER (HCC): ICD-10-CM

## 2024-06-20 DIAGNOSIS — C18.9 METASTATIC COLON CANCER TO LIVER (HCC): ICD-10-CM

## 2024-06-20 DIAGNOSIS — E53.8 VITAMIN B12 DEFICIENCY: Primary | ICD-10-CM

## 2024-06-20 PROCEDURE — 96360 HYDRATION IV INFUSION INIT: CPT

## 2024-06-20 PROCEDURE — 96372 THER/PROPH/DIAG INJ SC/IM: CPT

## 2024-06-20 RX ORDER — CYANOCOBALAMIN 1000 UG/ML
1000 INJECTION, SOLUTION INTRAMUSCULAR; SUBCUTANEOUS
Status: DISCONTINUED | OUTPATIENT
Start: 2024-06-20 | End: 2024-06-23 | Stop reason: HOSPADM

## 2024-06-20 RX ADMIN — SODIUM CHLORIDE 1000 ML: 0.9 INJECTION, SOLUTION INTRAVENOUS at 13:29

## 2024-06-20 RX ADMIN — CYANOCOBALAMIN 1000 MCG: 1000 INJECTION, SOLUTION INTRAMUSCULAR at 13:37

## 2024-06-20 RX ADMIN — PEGFILGRASTIM 6 MG: 6 INJECTION SUBCUTANEOUS at 13:39

## 2024-06-20 NOTE — PROGRESS NOTES
This is a late note for pump documentation on 6/18/2024 @ 1555, reflected on todays date. Pump was started @ 1555 on 6/18/2024, by this writer and witnessed/double-checked by Samuel Li RN

## 2024-06-20 NOTE — PROGRESS NOTES
Pt tolerated hydration, neulasta and vitamin B12 injection without incident.  Pt declined AVS but is aware of her next appt on June 27 at 11:30.

## 2024-06-26 DIAGNOSIS — C78.7 METASTATIC COLON CANCER TO LIVER (HCC): Primary | ICD-10-CM

## 2024-06-26 DIAGNOSIS — E53.8 VITAMIN B12 DEFICIENCY: ICD-10-CM

## 2024-06-26 DIAGNOSIS — C18.9 METASTATIC COLON CANCER TO LIVER (HCC): Primary | ICD-10-CM

## 2024-06-26 RX ORDER — FLUOROURACIL 50 MG/ML
320 INJECTION, SOLUTION INTRAVENOUS ONCE
Status: DISCONTINUED | OUTPATIENT
Start: 2024-06-26 | End: 2024-06-26 | Stop reason: HOSPADM

## 2024-06-27 ENCOUNTER — HOSPITAL ENCOUNTER (OUTPATIENT)
Dept: INFUSION CENTER | Facility: CLINIC | Age: 69
Discharge: HOME | End: 2024-06-27
Payer: MEDICARE

## 2024-06-27 ENCOUNTER — TELEPHONE (OUTPATIENT)
Dept: PALLIATIVE MEDICINE | Facility: CLINIC | Age: 69
End: 2024-06-27

## 2024-06-27 DIAGNOSIS — C78.7 METASTATIC COLON CANCER TO LIVER (HCC): ICD-10-CM

## 2024-06-27 DIAGNOSIS — C18.9 METASTATIC COLON CANCER TO LIVER (HCC): ICD-10-CM

## 2024-06-27 DIAGNOSIS — E53.8 VITAMIN B12 DEFICIENCY: ICD-10-CM

## 2024-06-27 LAB
ALBUMIN SERPL BCG-MCNC: 3.6 G/DL (ref 3.5–5)
ALP SERPL-CCNC: 183 U/L (ref 34–104)
ALT SERPL W P-5'-P-CCNC: 27 U/L (ref 7–52)
ANION GAP SERPL CALCULATED.3IONS-SCNC: 9 MMOL/L (ref 4–13)
ANISOCYTOSIS BLD QL SMEAR: PRESENT
AST SERPL W P-5'-P-CCNC: 38 U/L (ref 13–39)
BASOPHILS # BLD MANUAL: 0 THOUSAND/UL (ref 0–0.1)
BASOPHILS NFR MAR MANUAL: 0 % (ref 0–1)
BILIRUB SERPL-MCNC: 0.26 MG/DL (ref 0.2–1)
BUN SERPL-MCNC: 13 MG/DL (ref 5–25)
CALCIUM SERPL-MCNC: 9.5 MG/DL (ref 8.4–10.2)
CHLORIDE SERPL-SCNC: 106 MMOL/L (ref 96–108)
CO2 SERPL-SCNC: 24 MMOL/L (ref 21–32)
CREAT SERPL-MCNC: 0.98 MG/DL (ref 0.6–1.3)
EOSINOPHIL # BLD MANUAL: 0 THOUSAND/UL (ref 0–0.4)
EOSINOPHIL NFR BLD MANUAL: 0 % (ref 0–6)
ERYTHROCYTE [DISTWIDTH] IN BLOOD BY AUTOMATED COUNT: 18.7 % (ref 11.6–15.1)
GFR SERPL CREATININE-BSD FRML MDRD: 59 ML/MIN/1.73SQ M
GLUCOSE SERPL-MCNC: 118 MG/DL (ref 65–140)
HCT VFR BLD AUTO: 29.5 % (ref 34.8–46.1)
HGB BLD-MCNC: 9.2 G/DL (ref 11.5–15.4)
LYMPHOCYTES # BLD AUTO: 11 % (ref 14–44)
LYMPHOCYTES # BLD AUTO: 2.35 THOUSAND/UL (ref 0.6–4.47)
MACROCYTES BLD QL AUTO: PRESENT
MCH RBC QN AUTO: 29.9 PG (ref 26.8–34.3)
MCHC RBC AUTO-ENTMCNC: 31.2 G/DL (ref 31.4–37.4)
MCV RBC AUTO: 96 FL (ref 82–98)
MONOCYTES # BLD AUTO: 0.34 THOUSAND/UL (ref 0–1.22)
MONOCYTES NFR BLD: 2 % (ref 4–12)
MYELOCYTE ABSOLUTE CT: 0.17 THOUSAND/UL (ref 0–0.1)
MYELOCYTES NFR BLD MANUAL: 1 % (ref 0–1)
NEUTROPHILS # BLD MANUAL: 13.94 THOUSAND/UL (ref 1.85–7.62)
NEUTS SEG NFR BLD AUTO: 83 % (ref 43–75)
PLATELET # BLD AUTO: 286 THOUSANDS/UL (ref 149–390)
PLATELET BLD QL SMEAR: ADEQUATE
PMV BLD AUTO: 9.7 FL (ref 8.9–12.7)
POLYCHROMASIA BLD QL SMEAR: PRESENT
POTASSIUM SERPL-SCNC: 3.7 MMOL/L (ref 3.5–5.3)
PROT SERPL-MCNC: 7.8 G/DL (ref 6.4–8.4)
RBC # BLD AUTO: 3.08 MILLION/UL (ref 3.81–5.12)
RBC MORPH BLD: PRESENT
SODIUM SERPL-SCNC: 139 MMOL/L (ref 135–147)
TSH SERPL DL<=0.05 MIU/L-ACNC: 2.2 UIU/ML (ref 0.45–4.5)
VARIANT LYMPHS # BLD AUTO: 3 %
WBC # BLD AUTO: 16.79 THOUSAND/UL (ref 4.31–10.16)

## 2024-06-27 PROCEDURE — 84443 ASSAY THYROID STIM HORMONE: CPT | Performed by: INTERNAL MEDICINE

## 2024-06-27 PROCEDURE — 85007 BL SMEAR W/DIFF WBC COUNT: CPT | Performed by: INTERNAL MEDICINE

## 2024-06-27 PROCEDURE — 85027 COMPLETE CBC AUTOMATED: CPT | Performed by: INTERNAL MEDICINE

## 2024-06-27 PROCEDURE — 80053 COMPREHEN METABOLIC PANEL: CPT | Performed by: INTERNAL MEDICINE

## 2024-06-27 NOTE — PROGRESS NOTES
Maira Moura  tolerated treatment well with no complications.      Maira Moura is aware of future appt on  7/1/2024 at 1200.

## 2024-06-28 ENCOUNTER — TELEPHONE (OUTPATIENT)
Dept: HEMATOLOGY ONCOLOGY | Facility: CLINIC | Age: 69
End: 2024-06-28

## 2024-06-28 ENCOUNTER — OFFICE VISIT (OUTPATIENT)
Dept: HEMATOLOGY ONCOLOGY | Facility: CLINIC | Age: 69
End: 2024-06-28
Payer: MEDICARE

## 2024-06-28 VITALS
OXYGEN SATURATION: 100 % | HEART RATE: 79 BPM | HEIGHT: 66 IN | BODY MASS INDEX: 30.95 KG/M2 | TEMPERATURE: 98.1 F | DIASTOLIC BLOOD PRESSURE: 80 MMHG | RESPIRATION RATE: 17 BRPM | SYSTOLIC BLOOD PRESSURE: 112 MMHG | WEIGHT: 192.6 LBS

## 2024-06-28 DIAGNOSIS — E53.8 VITAMIN B12 DEFICIENCY: Primary | ICD-10-CM

## 2024-06-28 DIAGNOSIS — C78.7 METASTATIC COLON CANCER TO LIVER (HCC): ICD-10-CM

## 2024-06-28 DIAGNOSIS — C18.9 METASTATIC COLON CANCER TO LIVER (HCC): ICD-10-CM

## 2024-06-28 DIAGNOSIS — I10 PRIMARY HYPERTENSION: ICD-10-CM

## 2024-06-28 PROCEDURE — 99214 OFFICE O/P EST MOD 30 MIN: CPT | Performed by: INTERNAL MEDICINE

## 2024-06-28 PROCEDURE — G2211 COMPLEX E/M VISIT ADD ON: HCPCS | Performed by: INTERNAL MEDICINE

## 2024-06-28 RX ORDER — LOSARTAN POTASSIUM 50 MG/1
50 TABLET ORAL 2 TIMES DAILY
Qty: 60 TABLET | Refills: 0 | Status: SHIPPED | OUTPATIENT
Start: 2024-06-28

## 2024-06-28 RX ORDER — DEXTROSE MONOHYDRATE 50 MG/ML
20 INJECTION, SOLUTION INTRAVENOUS ONCE
OUTPATIENT
Start: 2024-07-20

## 2024-06-28 RX ORDER — FLUOROURACIL 50 MG/ML
320 INJECTION, SOLUTION INTRAVENOUS ONCE
OUTPATIENT
Start: 2024-08-08

## 2024-06-28 RX ORDER — FLUOROURACIL 50 MG/ML
320 INJECTION, SOLUTION INTRAVENOUS ONCE
Status: CANCELLED | OUTPATIENT
Start: 2024-06-28

## 2024-06-28 RX ORDER — DEXTROSE MONOHYDRATE 50 MG/ML
20 INJECTION, SOLUTION INTRAVENOUS ONCE
OUTPATIENT
Start: 2024-08-08

## 2024-06-28 RX ORDER — FLUOROURACIL 50 MG/ML
320 INJECTION, SOLUTION INTRAVENOUS ONCE
OUTPATIENT
Start: 2024-07-20

## 2024-06-28 RX ORDER — CYANOCOBALAMIN 1000 UG/ML
1000 INJECTION, SOLUTION INTRAMUSCULAR; SUBCUTANEOUS
Start: 2024-07-22

## 2024-06-28 RX ORDER — CYANOCOBALAMIN 1000 UG/ML
1000 INJECTION, SOLUTION INTRAMUSCULAR; SUBCUTANEOUS
Start: 2024-08-10

## 2024-06-28 RX ORDER — LANOLIN ALCOHOL/MO/W.PET/CERES
CREAM (GRAM) TOPICAL
COMMUNITY
Start: 2024-05-08

## 2024-06-28 RX ORDER — SODIUM CHLORIDE 9 MG/ML
20 INJECTION, SOLUTION INTRAVENOUS ONCE AS NEEDED
OUTPATIENT
Start: 2024-07-20

## 2024-06-28 RX ORDER — SODIUM CHLORIDE 9 MG/ML
20 INJECTION, SOLUTION INTRAVENOUS ONCE AS NEEDED
OUTPATIENT
Start: 2024-08-08

## 2024-06-30 NOTE — PROGRESS NOTES
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, unresectable    2023 - FOLFOX    2023 - opdivo added to FOLFOX    2023-cycle 11-20% dose reduction of 5-FU bolus and oxaliplatin due to increased fatigue    2024 - oxaliplatin removed from treatment plan due to neuropathy - PET scan shows good disease control in all areas except colon lesion    3/2024 - right hemicolectomy - pT3N0    6/3/24 - cryoablation to liver (no interruption to systemic therapy)     Malignant neoplasm of right female breast (HCC)   2018 Initial Diagnosis    Malignant neoplasm of right female breast (HCC)     2018 Biopsy    Rt Breast US BX 1100 8cmfn, 4 passes 12g Marquee:  - Invasive breast carcinoma of no special type (ductal NST/invasive ductal carcinoma).  - grade 2  - %  - FL 95%  - HER2 negative     2018 Surgery    Right partial mastectomy and SLN biopsy:  Infiltrating ductal carcinoma, Grade 2/3, felt to be between 2.0 cm and 2.5 cm in greatest dimension  - No invasive tumor is seen at inked margins, but there is a focus of DCIS seen within approximately 1mm of the inked medial margin  - no LVI  - no LCIS  - 0/2 LN's  at least Stage IIA - pT2, pN0, cM0, G2.    - Dr Coronado     2018 -  Cancer Staged    Stage IIA - pT2, pN0, cM0, G2.       2019 Genomic Testing    Oncotype DX score: 13     2019 -  Hormone Therapy    anastrozole 1 mg daily as adjuvant endocrine therapy    - Dr Daley       2019 -  "4/3/2019 Radiation    Course: C1    Plan ID Energy Fractions Dose per Fraction (cGy) Dose Correction (cGy) Total Dose Delivered (cGy) Elapsed Days   R Breast 10X/6X 25 / 25 200 0 5,000 40   R BRST BOOST 16E 5 / 5 200 0 1,000 7      Treatment dates:  C1: 2/14/2019 - 4/3/2019       Metastatic colon cancer to liver (HCC)   7/6/2023 Genetic Testing    CARIS Results:   KRAS Pathogenic Variant Exon 2  p.G12D : lack of benefit of cetuximab, panitumumab Level 2   No other actionable mutation; MSI stable; TMB low   MiFOLOXAi Results: \"Decreased Benefit of FOLFOX + Bevacizumab in first line metastatic CRC.  This patient may achieve improved results by receiving an alternative to FOLFOX, such as FOLFIRI, as their initial regimen. As an adjustment to frontline FOLFOXIRI following toxicity: This patient may achieve improved results by removing the oxaliplatin portion of their regimen\".         7/11/2023 Initial Diagnosis    Metastatic colon cancer to liver (HCC)     7/13/2023 -  Cancer Staged    Staging form: Colon and Rectum, AJCC 8th Edition  - Clinical stage from 7/13/2023: cT3, cN0, pM1 - Signed by Harika Medina DO on 9/28/2023  Total positive nodes: 0       7/31/2023 -  Chemotherapy    cyanocobalamin, 1,000 mcg, Intramuscular, Every 30 days, 5 of 9 cycles  Administration: 1,000 mcg (5/9/2024), 1,000 mcg (5/24/2024), 1,000 mcg (6/20/2024)  potassium chloride, 20 mEq, Intravenous, Once, 2 of 2 cycles  Administration: 20 mEq (11/6/2023), 20 mEq (11/20/2023)  alteplase (CATHFLO), 2 mg, Intracatheter, Every 1 Minute as needed, 19 of 23 cycles  Administration: 2 mg (1/3/2024)  pegfilgrastim (NEULASTA), 6 mg, Subcutaneous, Once, 12 of 12 cycles  Administration: 6 mg (10/25/2023), 6 mg (11/8/2023), 6 mg (11/22/2023), 6 mg (12/6/2023), 6 mg (12/20/2023), 6 mg (1/12/2024), 6 mg (1/25/2024), 6 mg (4/25/2024), 6 mg (5/9/2024), 6 mg (5/24/2024), 6 mg (6/20/2024)  fluorouracil (ADRUCIL), 895 mg, Intravenous, Once, 19 of 23 " cycles  Dose modification: 320 mg/m2 (original dose 400 mg/m2, Cycle 11)  Administration: 900 mg (7/31/2023), 900 mg (8/14/2023), 900 mg (8/28/2023), 900 mg (9/11/2023), 900 mg (9/25/2023), 900 mg (10/9/2023), 900 mg (10/23/2023), 895 mg (11/6/2023), 895 mg (11/20/2023), 895 mg (12/4/2023), 700 mg (12/18/2023), 680 mg (1/10/2024), 680 mg (1/23/2024), 625 mg (4/23/2024), 625 mg (5/7/2024), 625 mg (5/22/2024), 625 mg (6/4/2024), 625 mg (6/18/2024)  nivolumab (OPDIVO) IVPB, 240 mg (100 % of original dose 240 mg), Intravenous, Once, 11 of 15 cycles  Dose modification: 240 mg (original dose 240 mg, Cycle 9)  Administration: 240 mg (11/20/2023), 240 mg (12/4/2023), 240 mg (12/18/2023), 240 mg (1/10/2024), 240 mg (1/23/2024), 240 mg (4/23/2024), 240 mg (5/7/2024), 240 mg (5/22/2024), 240 mg (6/4/2024), 240 mg (6/18/2024)  leucovorin calcium IVPB, 896 mg, Intravenous, Once, 19 of 23 cycles  Administration: 900 mg (7/31/2023), 900 mg (8/14/2023), 900 mg (8/28/2023), 900 mg (9/11/2023), 900 mg (9/25/2023), 900 mg (10/9/2023), 900 mg (10/23/2023), 900 mg (11/6/2023), 900 mg (11/20/2023), 900 mg (12/4/2023), 900 mg (12/18/2023), 850 mg (1/10/2024), 850 mg (1/23/2024), 800 mg (4/23/2024), 800 mg (5/7/2024), 800 mg (5/22/2024), 800 mg (6/4/2024), 800 mg (6/18/2024)  oxaliplatin (ELOXATIN) chemo infusion, 85 mg/m2 = 190.4 mg, Intravenous, Once, 12 of 12 cycles  Dose modification: 68 mg/m2 (original dose 85 mg/m2, Cycle 11, Reason: Other (Must fill in a comment), Comment: increased fatigue)  Administration: 190.4 mg (7/31/2023), 190.4 mg (8/14/2023), 200 mg (8/28/2023), 200 mg (9/11/2023), 200 mg (9/25/2023), 200 mg (10/9/2023), 200 mg (10/23/2023), 200 mg (11/6/2023), 200 mg (11/20/2023), 190.4 mg (12/4/2023), 150 mg (12/18/2023), 150 mg (1/10/2024)  fluorouracil (ADRUCIL) ambulatory infusion Soln, 1,200 mg/m2/day = 5,375 mg, Intravenous, Over 46 hours, 19 of 23 cycles     9/26/2023 -  Cancer Staged    Staging form: Colon and  Rectum, AJCC 8th Edition  - Pathologic: pT3, pN0, cM1 - Signed by Vickie Israel MD on 4/3/2024  Total positive nodes: 0       3/12/2024 Surgery    A. Terminal ileum, appendix, right colon (right hemicolectomy):  - Adenocarcinoma (5.2cm)  - Forty-nine (49) lymph nodes negative for carcinoma (0/49)    - Acellular mucin present in one (1) lymph node   - See staging synoptic (ypT3N0)     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HCC)   7/27/2023 Initial Diagnosis    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HCC)     7/27/2023 -  Cancer Staged    Staging form: Pharynx - Oropharynx, AJCC 8th Edition  - Clinical stage from 7/27/2023: Stage III (cT1, cN3, cM0, p16+) - Signed by Evan Plummer MD on 7/27/2023  Stage prefix: Initial diagnosis           Interval History: Patient presents for follow up of her metastatic colon cancer and at least 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  She had a cryoablation for her known liver disease on Evelin 3.  She did not have to interrupt her systemic therapy and she remains on 5-FU and nivolumab.  Clinically she is doing well.  She put on 4 pounds since her last visit.  She denies any worsening of neuropathy.  No fevers or infections.  Her white blood cell count on her CBC was elevated at 16.7 but she did receive growth factor with her last cycle of 5-FU and nivolumab.  She has some mild sinus congestion.  This is getting better over time and not worse.  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         REVIEW OF SYSTEMS:  Please note that a 14-point review of systems was performed to include Constitutional, HEENT, Respiratory, CVS, GI, , Musculoskeletal, Integumentary, Neurologic, Rheumatologic, Endocrinologic, Psychiatric, Lymphatic, and Hematologic/Oncologic systems were reviewed and are negative unless otherwise stated in HPI. Positive and negative findings pertinent to this evaluation are incorporated into the history of  present illness.      ECOG PS: 1    PROBLEM LIST:  Patient Active Problem List   Diagnosis    Primary hypertension    Mixed hyperlipidemia    Malignant neoplasm of right female breast (HCC)    Vitamin D deficiency    Prediabetes    Right thyroid nodule    GERD (gastroesophageal reflux disease)    Obesity    Metastatic colon cancer to liver (HCC)    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HCC)    Poor appetite    Nutritional anemia    Dehydration    Drug-induced constipation    Chemotherapy induced neutropenia (HCC)    Stage 3a chronic kidney disease (HCC)    Thrombocytopenia (HCC)    Neuropathy    Diastolic dysfunction    Hypokalemia    Leukemoid reaction    GOGO (acute kidney injury) (HCC)    Acute post-operative pain    At risk for constipation    At risk for delirium    Advance care planning    Physical deconditioning    History of CVA (cerebrovascular accident)    Chronic nasal congestion    Elevated LFTs    Vitamin B12 deficiency       Assessment / Plan: 69-year-old female with metastatic adenocarcinoma of the colon and at least 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  She is doing well and has actually gained some weight.  I gave her more Ensure samples today.  We will continue with 5-FU and nivolumab.  I took the growth factor out of her treatment plan as it will not be necessary anymore now that she is not on oxaliplatin.  I am going to see her back in 1 month and get a CT scan at that time to assess the rest of her disease.  On exam the lymphadenopathy in her neck is under good control and I do not see any obvious evidence of progression of her tonsil cancer clinically.  She will continue gabapentin for neuropathy.  She knows to call in the interim with any questions or concerns.       I spent 30 minutes on chart review, face to face counseling time, coordination of care and documentation.    Past Medical History:   has a past medical history of Anemia, Arthritis, Body mass index (BMI)  40.0-44.9, adult (HCC), Breast cancer (HCC) (12/17/2018), Cancer (HCC), Cervical lymphadenopathy, Encounter for screening for HIV (07/07/2022), Follow-up examination (04/04/2023), GERD (gastroesophageal reflux disease), Hyperlipidemia, Hypertension, Obesity, Stroke (HCC), Stroke (HCC), Transaminitis (09/22/2021), and Vasomotor rhinitis.    PAST SURGICAL HISTORY:   has a past surgical history that includes US guided breast biopsy right complete (Right, 11/23/2018); Breast surgery; pr mastectomy partial w/axillary lymphadenectomy (Right, 12/20/2018); Tubal ligation; Breast biopsy (Right, 11/23/2018); US guided injection for research study (12/20/2018); US guided injection for research study (12/07/2018); US guided injection for research study (05/03/2019); US guided lymph node biopsy right (05/26/2023); IR biopsy liver mass (06/27/2023); pr laryngoscopy w/biopsy microscope/telescope (N/A, 07/20/2023); pr [facility] incl fluor gdnce dx w/cell washg spx (N/A, 07/20/2023); ESOPHAGOSCOPY (N/A, 07/20/2023); IR port placement (07/28/2023); IR port check (01/03/2024); IR port stripping (01/09/2024); Colonoscopy; Hemicoloectomy w/ anastomosis (Right, 3/12/2024); LAPAROTOMY (N/A, 3/12/2024); and IR cryoablation (6/3/2024).    CURRENT MEDICATIONS  Current Outpatient Medications   Medication Sig Dispense Refill    anastrozole (ARIMIDEX) 1 mg tablet Take 1 tablet (1 mg total) by mouth daily 90 tablet 3    atorvastatin (LIPITOR) 40 mg tablet Take 1 tablet (40 mg total) by mouth daily at bedtime 30 tablet 2    docusate sodium (COLACE) 50 mg capsule Take 1 capsule (50 mg total) by mouth 2 (two) times a day 90 capsule 1    ergocalciferol (VITAMIN D2) 50,000 units Take 1 capsule (50,000 Units total) by mouth once a week 90 capsule 3    ferrous sulfate 325 (65 Fe) mg tablet Take 1 tablet (325 mg total) by mouth daily with breakfast 30 tablet 0    folic acid (FOLVITE) 1 mg tablet Take 1 tablet (1 mg total) by mouth in the morning 90 tablet  3    gabapentin (NEURONTIN) 300 mg capsule Take 1 pills in the morning, 1 pill at lunch and 2 pills before bed 120 capsule 3    hydrocortisone 1 % ointment       lidocaine-prilocaine (EMLA) cream Apply topically as needed for mild pain 30 g 3    losartan (COZAAR) 50 mg tablet Take 1 tablet (50 mg total) by mouth 2 (two) times a day 60 tablet 0    mirtazapine (REMERON) 15 mg tablet Take 1 tablet (15 mg total) by mouth daily at bedtime Patient is also on a 30 mg tablet, for a total dose of 45 mg 30 tablet 3    mirtazapine (REMERON) 30 mg tablet Take 1 tablet (30 mg total) by mouth daily at bedtime 30 tablet 3    omeprazole (PriLOSEC) 20 mg delayed release capsule Take 1 capsule (20 mg total) by mouth daily 30 capsule 5    ondansetron (ZOFRAN) 8 mg tablet Take 1 tablet (8 mg total) by mouth every 8 (eight) hours as needed for nausea or vomiting 30 tablet 3    potassium chloride (Klor-Con M20) 20 mEq tablet Take 1 tablet (20 mEq total) by mouth 2 (two) times a day 60 tablet 1    prochlorperazine (COMPAZINE) 10 mg tablet Take 1 tablet (10 mg total) by mouth every 6 (six) hours as needed for nausea or vomiting 30 tablet 3    pyridoxine (VITAMIN B6) 50 mg tablet Take 1 tablet (50 mg total) by mouth daily 90 tablet 0    vitamin B-12 (VITAMIN B-12) 1,000 mcg tablet        No current facility-administered medications for this visit.     [unfilled]    SOCIAL HISTORY:   reports that she has never smoked. She has never been exposed to tobacco smoke. She has never used smokeless tobacco. She reports that she does not drink alcohol and does not use drugs.     FAMILY HISTORY:  family history includes Colon cancer (age of onset: 58) in her mother; Hypertension in her daughter, mother, and son; Pancreatic cancer (age of onset: 60) in her father.     ALLERGIES:  has No Known Allergies.      Physical Exam:  Vital Signs:   Visit Vitals  /80 (BP Location: Left arm, Patient Position: Sitting, Cuff Size: Adult)   Pulse 79   Temp 98.1  "°F (36.7 °C)   Resp 17   Ht 5' 5.98\" (1.676 m)   Wt 87.4 kg (192 lb 9.6 oz)   SpO2 100%   BMI 31.11 kg/m²   OB Status Postmenopausal   Smoking Status Never   BSA 1.97 m²     Body mass index is 31.11 kg/m².  Body surface area is 1.97 meters squared.    GEN: Alert, awake oriented x3, in no acute distress  HEENT- No pallor, icterus, cyanosis, no oral mucosal lesions,   LAD - no palpable cervical, clavicle, axillary, inguinal LAD  Heart- normal S1 S2, regular rate and rhythm, No murmur, rubs.   Lungs- clear breathing sound bilateral.   Abdomen- soft, Non tender, bowel sounds present  Extremities- No cyanosis, clubbing, edema  Neuro- No focal neurological deficit    Labs:  Lab Results   Component Value Date    WBC 16.79 (H) 06/27/2024    HGB 9.2 (L) 06/27/2024    HCT 29.5 (L) 06/27/2024    MCV 96 06/27/2024     06/27/2024     Lab Results   Component Value Date    SODIUM 139 06/27/2024    K 3.7 06/27/2024     06/27/2024    CO2 24 06/27/2024    AGAP 9 06/27/2024    BUN 13 06/27/2024    CREATININE 0.98 06/27/2024    GLUC 118 06/27/2024    GLUF 101 (H) 06/03/2024    CALCIUM 9.5 06/27/2024    AST 38 06/27/2024    ALT 27 06/27/2024    ALKPHOS 183 (H) 06/27/2024    TP 7.8 06/27/2024    TBILI 0.26 06/27/2024    EGFR 59 06/27/2024       "

## 2024-07-01 ENCOUNTER — HOSPITAL ENCOUNTER (OUTPATIENT)
Dept: INFUSION CENTER | Facility: CLINIC | Age: 69
Discharge: HOME | End: 2024-07-01
Payer: MEDICARE

## 2024-07-01 ENCOUNTER — PATIENT OUTREACH (OUTPATIENT)
Dept: HEMATOLOGY ONCOLOGY | Facility: CLINIC | Age: 69
End: 2024-07-01

## 2024-07-01 VITALS
BODY MASS INDEX: 30.75 KG/M2 | HEART RATE: 97 BPM | RESPIRATION RATE: 18 BRPM | SYSTOLIC BLOOD PRESSURE: 123 MMHG | TEMPERATURE: 98 F | HEIGHT: 66 IN | DIASTOLIC BLOOD PRESSURE: 81 MMHG | WEIGHT: 191.36 LBS

## 2024-07-01 DIAGNOSIS — E53.8 VITAMIN B12 DEFICIENCY: Primary | ICD-10-CM

## 2024-07-01 DIAGNOSIS — C18.9 METASTATIC COLON CANCER TO LIVER (HCC): ICD-10-CM

## 2024-07-01 DIAGNOSIS — C78.7 METASTATIC COLON CANCER TO LIVER (HCC): ICD-10-CM

## 2024-07-01 PROCEDURE — 96413 CHEMO IV INFUSION 1 HR: CPT

## 2024-07-01 PROCEDURE — 96411 CHEMO IV PUSH ADDL DRUG: CPT

## 2024-07-01 PROCEDURE — 96367 TX/PROPH/DG ADDL SEQ IV INF: CPT

## 2024-07-01 PROCEDURE — G0498 CHEMO EXTEND IV INFUS W/PUMP: HCPCS

## 2024-07-01 RX ORDER — FLUOROURACIL 50 MG/ML
320 INJECTION, SOLUTION INTRAVENOUS ONCE
Status: COMPLETED | OUTPATIENT
Start: 2024-07-01 | End: 2024-07-01

## 2024-07-01 RX ORDER — SODIUM CHLORIDE 9 MG/ML
20 INJECTION, SOLUTION INTRAVENOUS ONCE AS NEEDED
Status: DISCONTINUED | OUTPATIENT
Start: 2024-06-26 | End: 2024-07-04 | Stop reason: HOSPADM

## 2024-07-01 RX ORDER — DEXTROSE MONOHYDRATE 50 MG/ML
20 INJECTION, SOLUTION INTRAVENOUS ONCE
Status: DISCONTINUED | OUTPATIENT
Start: 2024-07-01 | End: 2024-07-04 | Stop reason: HOSPADM

## 2024-07-01 RX ADMIN — FLUOROURACIL 625 MG: 50 INJECTION, SOLUTION INTRAVENOUS at 14:11

## 2024-07-01 RX ADMIN — SODIUM CHLORIDE 240 MG: 9 INJECTION, SOLUTION INTRAVENOUS at 12:56

## 2024-07-01 RX ADMIN — SODIUM CHLORIDE 20 ML/HR: 0.9 INJECTION, SOLUTION INTRAVENOUS at 12:12

## 2024-07-01 RX ADMIN — LEUCOVORIN CALCIUM 800 MG: 500 INJECTION, POWDER, LYOPHILIZED, FOR SOLUTION INTRAMUSCULAR; INTRAVENOUS at 13:38

## 2024-07-01 RX ADMIN — SODIUM CHLORIDE 1000 ML: 0.9 INJECTION, SOLUTION INTRAVENOUS at 12:12

## 2024-07-01 RX ADMIN — DEXAMETHASONE SODIUM PHOSPHATE: 10 INJECTION, SOLUTION INTRAMUSCULAR; INTRAVENOUS at 12:16

## 2024-07-01 NOTE — PROGRESS NOTES
Pt tolerated treatment without incident.  Pt connected to BRANDIE as ordered.  Pt instructed to return to infusion center on Wednesday at 12:30 for BRANDIE disconnect.  Pt was provided with AVS and appt on Wednesday was reviewed with pt.

## 2024-07-01 NOTE — PROGRESS NOTES
Returned pts call, she stated she had not received a call yet about her ride today, but they now called with a  time, and she does not need assistance anymore at this time

## 2024-07-03 ENCOUNTER — OFFICE VISIT (OUTPATIENT)
Dept: SURGICAL ONCOLOGY | Facility: CLINIC | Age: 69
End: 2024-07-03
Payer: MEDICARE

## 2024-07-03 ENCOUNTER — HOSPITAL ENCOUNTER (OUTPATIENT)
Dept: INFUSION CENTER | Facility: CLINIC | Age: 69
Discharge: HOME | End: 2024-07-03
Payer: MEDICARE

## 2024-07-03 VITALS
OXYGEN SATURATION: 99 % | WEIGHT: 193 LBS | BODY MASS INDEX: 31.02 KG/M2 | SYSTOLIC BLOOD PRESSURE: 110 MMHG | TEMPERATURE: 97.3 F | RESPIRATION RATE: 18 BRPM | HEIGHT: 66 IN | DIASTOLIC BLOOD PRESSURE: 76 MMHG | HEART RATE: 96 BPM

## 2024-07-03 VITALS
TEMPERATURE: 97 F | HEART RATE: 88 BPM | DIASTOLIC BLOOD PRESSURE: 78 MMHG | RESPIRATION RATE: 18 BRPM | SYSTOLIC BLOOD PRESSURE: 112 MMHG

## 2024-07-03 DIAGNOSIS — C18.9 METASTATIC COLON CANCER TO LIVER (HCC): Primary | ICD-10-CM

## 2024-07-03 DIAGNOSIS — C09.9 RIGHT TONSILLAR SQUAMOUS CELL CARCINOMA (HCC): ICD-10-CM

## 2024-07-03 DIAGNOSIS — C78.7 METASTATIC COLON CANCER TO LIVER (HCC): ICD-10-CM

## 2024-07-03 DIAGNOSIS — C78.7 METASTATIC COLON CANCER TO LIVER (HCC): Primary | ICD-10-CM

## 2024-07-03 DIAGNOSIS — C18.9 METASTATIC COLON CANCER TO LIVER (HCC): ICD-10-CM

## 2024-07-03 DIAGNOSIS — E53.8 VITAMIN B12 DEFICIENCY: Primary | ICD-10-CM

## 2024-07-03 PROCEDURE — 96360 HYDRATION IV INFUSION INIT: CPT

## 2024-07-03 PROCEDURE — 96372 THER/PROPH/DIAG INJ SC/IM: CPT

## 2024-07-03 PROCEDURE — 99215 OFFICE O/P EST HI 40 MIN: CPT | Performed by: STUDENT IN AN ORGANIZED HEALTH CARE EDUCATION/TRAINING PROGRAM

## 2024-07-03 RX ORDER — CYANOCOBALAMIN 1000 UG/ML
1000 INJECTION, SOLUTION INTRAMUSCULAR; SUBCUTANEOUS
Status: DISCONTINUED | OUTPATIENT
Start: 2024-07-03 | End: 2024-07-06 | Stop reason: HOSPADM

## 2024-07-03 RX ADMIN — CYANOCOBALAMIN 1000 MCG: 1000 INJECTION, SOLUTION INTRAMUSCULAR at 12:46

## 2024-07-03 RX ADMIN — SODIUM CHLORIDE 1000 ML: 0.9 INJECTION, SOLUTION INTRAVENOUS at 12:30

## 2024-07-03 NOTE — ASSESSMENT & PLAN NOTE
This is a 68-year-old female with metastatic colon ca to liver and tonsillar SCC met to nodes. On systemic treatment and has completed 12 cycles folfox. Was on nivo and maintenance 5FU and f/u PET 1/2024 with activity only in colon mass which was more avid than before; was also having abd discomfort and sx c/w partial obstruction. Now s/p RT robo hemicolectomy and doing very well. Path with 0/49 nodes positive, T3 tumor with basically zero response to chemo. Given low burden of disease, we decided on cryo for her single liver lesion. She is now on maintenance 5FU and nivo. Plan for PET in July. I will maintain contact with Dr Medina re: liver response and overall tumor burden. I will not sched f/u at this time until PET results.

## 2024-07-03 NOTE — ASSESSMENT & PLAN NOTE
Will contact DR Plummer / Sammi given that colon cancer is stable to determine whether additional surveillance scope etc needs to be obtained.

## 2024-07-03 NOTE — PROGRESS NOTES
Maira Moura  tolerated treatment well with no complications.      Maira Moura is aware of future appt on Monday Jul 15, 2024 11:30 AM.    AVS declined by Maira Moura.

## 2024-07-03 NOTE — PROGRESS NOTES
Surgical Oncology Consultation F/U    [facility] [place]  [address] [facility] [address] [phone]    Patient:  Maira Moura  1955  [id number]    Primary Care provider:  Fanta Turner MD  [address]  [facility] [address]    Referring provider:  No referring provider defined for this encounter.      ASSESSMENT AND PLAN    Diagnoses and all orders for this visit:    Metastatic colon cancer to liver (HCC)    1. Metastatic colon cancer to liver (HCC)  Assessment & Plan:  This is a 68-year-old female with metastatic colon ca to liver and tonsillar SCC met to nodes. On systemic treatment and has completed 12 cycles folfox. Was on nivo and maintenance 5FU and f/u PET 1/2024 with activity only in colon mass which was more avid than before; was also having abd discomfort and sx c/w partial obstruction. Now s/p RT robo hemicolectomy and doing very well. Path with 0/49 nodes positive, T3 tumor with basically zero response to chemo. Given low burden of disease, we decided on cryo for her single liver lesion. She is now on maintenance 5FU and nivo. Plan for PET in July. I will maintain contact with Dr Medina re: liver response and overall tumor burden. I will not sched f/u at this time until PET results.   2. 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HCC)  Overview:  CT neck on 3/30/2023- Large right level 2A lymphadenopathy as described above and suspicious for neoplasm.  Correlation with histopathology is recommended.  Mild asymmetric prominence of the right palatine tonsil with otherwise no definitive nodular enhancing lesions identified along the course of the aerodigestive tract.    5/26/23- FNA of this node was nonrevealing for tissue etiology, but it did reveal carcinoma. Suspicious for malignancy.  Clusters of atypical epithelioid cells in a background of lymphoid cells, suspicious for carcinoma.Satisfactory  for evaluation.  Assessment & Plan:  Will contact DR Plummer / Sammi given that colon cancer is stable to determine whether additional surveillance scope etc needs to be obtained.         Chief Complaint   Patient presents with    Follow-up       No follow-ups on file.    Oncology History Overview Note   2/2019 - pT2N0 breast cancer treated with anastrozole, oncotype 13, ER+ WA+ Her2 neg     7/2023 - metastatic ascending colon adenocarcinoma to liver    Caris - KRAS G12D, CAIN, PD-L1 0%    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, unresectable    7/31/2023 - FOLFOX    11/20/2023 - opdivo added to FOLFOX    12/18/2023-cycle 11-20% dose reduction of 5-FU bolus and oxaliplatin due to increased fatigue    Jan 2024 - oxaliplatin removed from treatment plan due to neuropathy - PET scan shows good disease control in all areas except colon lesion    3/2024 - right hemicolectomy - pT3N0    6/3/24 - cryoablation to liver (no interruption to systemic therapy)     Malignant neoplasm of right female breast (HCC)   11/23/2018 Initial Diagnosis    Malignant neoplasm of right female breast (HCC)     11/23/2018 Biopsy    Rt Breast US BX 1100 8cmfn, 4 passes 12g Marquee:  - Invasive breast carcinoma of no special type (ductal NST/invasive ductal carcinoma).  - grade 2  - %  - WA 95%  - HER2 negative     12/20/2018 Surgery    Right partial mastectomy and SLN biopsy:  Infiltrating ductal carcinoma, Grade 2/3, felt to be between 2.0 cm and 2.5 cm in greatest dimension  - No invasive tumor is seen at inked margins, but there is a focus of DCIS seen within approximately 1mm of the inked medial margin  - no LVI  - no LCIS  - 0/2 LN's  at least Stage IIA - pT2, pN0, cM0, G2.    - Dr Coronado     12/20/2018 -  Cancer Staged    Stage IIA - pT2, pN0, cM0, G2.       1/4/2019 Genomic Testing    Oncotype DX score: 13     2/1/2019 -  Hormone Therapy    anastrozole 1 mg daily as adjuvant endocrine therapy    - Dr Daley      "  2/14/2019 - 4/3/2019 Radiation    Course: C1    Plan ID Energy Fractions Dose per Fraction (cGy) Dose Correction (cGy) Total Dose Delivered (cGy) Elapsed Days   R Breast 10X/6X 25 / 25 200 0 5,000 40   R BRST BOOST 16E 5 / 5 200 0 1,000 7      Treatment dates:  C1: 2/14/2019 - 4/3/2019       Metastatic colon cancer to liver (HCC)   7/6/2023 Genetic Testing    CARIS Results:   KRAS Pathogenic Variant Exon 2  p.G12D : lack of benefit of cetuximab, panitumumab Level 2   No other actionable mutation; MSI stable; TMB low   MiFOLOXAi Results: \"Decreased Benefit of FOLFOX + Bevacizumab in first line metastatic CRC.  This patient may achieve improved results by receiving an alternative to FOLFOX, such as FOLFIRI, as their initial regimen. As an adjustment to frontline FOLFOXIRI following toxicity: This patient may achieve improved results by removing the oxaliplatin portion of their regimen\".         7/11/2023 Initial Diagnosis    Metastatic colon cancer to liver (HCC)     7/13/2023 -  Cancer Staged    Staging form: Colon and Rectum, AJCC 8th Edition  - Clinical stage from 7/13/2023: cT3, cN0, pM1 - Signed by Harika Medina DO on 9/28/2023  Total positive nodes: 0       7/31/2023 -  Chemotherapy    cyanocobalamin, 1,000 mcg, Intramuscular, Every 30 days, 5 of 9 cycles  Administration: 1,000 mcg (5/9/2024), 1,000 mcg (5/24/2024), 1,000 mcg (6/20/2024)  potassium chloride, 20 mEq, Intravenous, Once, 2 of 2 cycles  Administration: 20 mEq (11/6/2023), 20 mEq (11/20/2023)  alteplase (CATHFLO), 2 mg, Intracatheter, Every 1 Minute as needed, 19 of 23 cycles  Administration: 2 mg (1/3/2024)  pegfilgrastim (NEULASTA), 6 mg, Subcutaneous, Once, 12 of 12 cycles  Administration: 6 mg (10/25/2023), 6 mg (11/8/2023), 6 mg (11/22/2023), 6 mg (12/6/2023), 6 mg (12/20/2023), 6 mg (1/12/2024), 6 mg (1/25/2024), 6 mg (4/25/2024), 6 mg (5/9/2024), 6 mg (5/24/2024), 6 mg (6/20/2024)  fluorouracil (ADRUCIL), 895 mg, Intravenous, Once, 19 of " 23 cycles  Dose modification: 320 mg/m2 (original dose 400 mg/m2, Cycle 11)  Administration: 900 mg (7/31/2023), 900 mg (8/14/2023), 900 mg (8/28/2023), 900 mg (9/11/2023), 900 mg (9/25/2023), 900 mg (10/9/2023), 900 mg (10/23/2023), 895 mg (11/6/2023), 895 mg (11/20/2023), 895 mg (12/4/2023), 700 mg (12/18/2023), 680 mg (1/10/2024), 680 mg (1/23/2024), 625 mg (4/23/2024), 625 mg (5/7/2024), 625 mg (5/22/2024), 625 mg (6/4/2024), 625 mg (6/18/2024), 625 mg (7/1/2024)  nivolumab (OPDIVO) IVPB, 240 mg (100 % of original dose 240 mg), Intravenous, Once, 11 of 15 cycles  Dose modification: 240 mg (original dose 240 mg, Cycle 9)  Administration: 240 mg (11/20/2023), 240 mg (12/4/2023), 240 mg (12/18/2023), 240 mg (1/10/2024), 240 mg (1/23/2024), 240 mg (4/23/2024), 240 mg (5/7/2024), 240 mg (5/22/2024), 240 mg (6/4/2024), 240 mg (6/18/2024), 240 mg (7/1/2024)  leucovorin calcium IVPB, 896 mg, Intravenous, Once, 19 of 23 cycles  Administration: 900 mg (7/31/2023), 900 mg (8/14/2023), 900 mg (8/28/2023), 900 mg (9/11/2023), 900 mg (9/25/2023), 900 mg (10/9/2023), 900 mg (10/23/2023), 900 mg (11/6/2023), 900 mg (11/20/2023), 900 mg (12/4/2023), 900 mg (12/18/2023), 850 mg (1/10/2024), 850 mg (1/23/2024), 800 mg (4/23/2024), 800 mg (5/7/2024), 800 mg (5/22/2024), 800 mg (6/4/2024), 800 mg (6/18/2024), 800 mg (7/1/2024)  oxaliplatin (ELOXATIN) chemo infusion, 85 mg/m2 = 190.4 mg, Intravenous, Once, 12 of 12 cycles  Dose modification: 68 mg/m2 (original dose 85 mg/m2, Cycle 11, Reason: Other (Must fill in a comment), Comment: increased fatigue)  Administration: 190.4 mg (7/31/2023), 190.4 mg (8/14/2023), 200 mg (8/28/2023), 200 mg (9/11/2023), 200 mg (9/25/2023), 200 mg (10/9/2023), 200 mg (10/23/2023), 200 mg (11/6/2023), 200 mg (11/20/2023), 190.4 mg (12/4/2023), 150 mg (12/18/2023), 150 mg (1/10/2024)  fluorouracil (ADRUCIL) ambulatory infusion Soln, 1,200 mg/m2/day = 5,375 mg, Intravenous, Over 46 hours, 19 of 23 cycles      9/26/2023 -  Cancer Staged    Staging form: Colon and Rectum, AJCC 8th Edition  - Pathologic: pT3, pN0, cM1 - Signed by Vickie Israel MD on 4/3/2024  Total positive nodes: 0       3/12/2024 Surgery    A. Terminal ileum, appendix, right colon (right hemicolectomy):  - Adenocarcinoma (5.2cm)  - Forty-nine (49) lymph nodes negative for carcinoma (0/49)    - Acellular mucin present in one (1) lymph node   - See staging synoptic (ypT3N0)     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HCC)   7/27/2023 Initial Diagnosis    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HCC)     7/27/2023 -  Cancer Staged    Staging form: Pharynx - Oropharynx, AJCC 8th Edition  - Clinical stage from 7/27/2023: Stage III (cT1, cN3, cM0, p16+) - Signed by Evan Plummer MD on 7/27/2023  Stage prefix: Initial diagnosis           History of Present Illness  :   This is a 68-year-old female seen today with a new right neck nodule.  Briefly, the patient presented to her primary care physician due to what she describes as throat soreness.  A large right neck nodule was identified, prompting a CT scan of the neck.  This demonstrated a near 5 cm right level 2 lymph node with multicystic components.  It also detected a right heterogeneous thyroid nodule of about 2 cm which did not appear to be locally invasive. The patient denies any significant symptoms of throat pain, trouble breathing, trouble swallowing, pain with swallowing, voice changes.  She does describe a sore throat for months if not years.  She states that the right sided neck nodule became enlarged over the last couple of months but it has not really bothered her.  She does have a history of right breast cancer treated with breast conservation in 2018.  Her mammograms since that time have been benign.  She denies any other lumps or bumps of the left neck, axilla, inguinal regions.  She denies any fevers, chills, night sweats, weight loss, other systemic symptoms.    At this  juncture, given that she does not have any symptoms concerning for lymphoma and her enlarged lymph nodes are limited to the right neck where she also has a right thyroid nodule, I am concerned about a lymph node involved thyroid cancer.  I would like her to undergo an FNA of the right neck node to determine a baseline histology.  Likewise, she will need to undergo the thyroid ultrasound she is scheduled for as well as a potential right thyroid biopsy depending on these results.    Interval 6/7/23  Patient presents today for interval follow-up after the above investigations.  Thyroid ultrasound revealed a spongiform nodule with questionable criteria for biopsy.  Percutaneous FNA of the enlarged right cervical node revealed carcinoma, however, they were not able to determine tissue of etiology given the scant tissue.  The patient's symptoms are largely unchanged.    8/2023  Two cycles FOLFOX completed for metastatic colon ca to liver. Also diagnosed with SCC at tonsil with mets to nodes. Plan for eventual XRT it appears. Doing well - swallowing well. Poor appetite but working on it. No abd pain, n/v, trouble with BM    1/2024  Pt seen in f/u. She is status post 12 total cycles of FOLFOX with nivolumab added in November.  PET scan prior to this visit shows good control of all areas of disease with the exception of increase in the primary colon mass. Her SCC is now un-detectable by PET - she has not received XRT for this. 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 Last 5FU nivo one week ago. Does suffer with constipation. No blood per rectum.     Today we discussed robo right colectomy and risks to include infection, bleeding, anastomotic leak.  The patient has not had surgery within her abdomen before and thus her risks for completion robotically are low, however, she is obese and this does increase her risks.  I like to discuss with Dr. Medina what it would look like to come off  therapy.  Likewise, she will be discussed at our tumor board.  Finally, I like to discuss management of her squamous cell cancer and whether the Nivo has been effective for this and if holding it will be risky for recurrence.  Will plan on robotic right colectomy in roughly 5 weeks which would be 6 weeks from her most recent treatment.  She will also need cardiac clearance given her history.      3/2024  Discussed at  as well as with Dr Medina and will plan on right colon. Cardiac eval obtained and pt at acceptable level of risk without need for further testing.     4/2024  S/p robo right colon. Had hard recovery 2/2 preop deconditioning but is now home and doing great. One BM every 3 days and taking stool softeners. Eating well and getting stronger.     7/2024  On maintenance 5FU and nivo. Ablated single liver lesion in June - of note this was larger than previous at time of ablation. Was seen in ED shortly thereafter with dizziness following infusion. Today she is doing well. Needs daily stool softeners and every other day laxative. No complaints. No blood in her stool. Saw Dr Medina last mo and is scheduled for PET in July.    Review of Systems  Complete ROS Surg Onc:   Constitutional: The patient denies new or recent history of general fatigue, no recent weight loss, no change in appetite.   Eyes: No complaints of visual problems, no scleral icterus.   ENT: No complaints of ear pain, no hoarseness, no difficulty swallowing,  no tinnitus and no new masses in head, oral cavity, or neck.   Cardiovascular: No complaints of chest pain, no palpitations, no ankle edema.   Respiratory: No complaints of shortness of breath, no cough.   Gastrointestinal: No complaints of jaundice, no bloody stools, no pale stools.   Genitourinary: No complaints of dysuria, no hematuria, no nocturia, no frequent urination, no urethral discharge.   Musculoskeletal: No complaints of weakness, paralysis, joint stiffness or  arthralgias.  Integumentary: No complaints of rash, no new lesions.   Neurological: No complaints of convulsions, no seizures, no dizziness.   Hematologic/Lymphatic: No complaints of easy bruising.   Endocrine:  No hot or cold intolerance.  No polydipsia, polyphagia, or polyuria.  Allergy/immunology:  No environmental allergies.  No food allergies.  Not immunocompromised.      Patient Active Problem List   Diagnosis    Primary hypertension    Mixed hyperlipidemia    Malignant neoplasm of right female breast (HCC)    Vitamin D deficiency    Prediabetes    Right thyroid nodule    GERD (gastroesophageal reflux disease)    Obesity    Metastatic colon cancer to liver (HCC)    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HCC)    Poor appetite    Nutritional anemia    Dehydration    Drug-induced constipation    Chemotherapy induced neutropenia (HCC)    Stage 3a chronic kidney disease (HCC)    Thrombocytopenia (HCC)    Neuropathy    Diastolic dysfunction    Hypokalemia    Leukemoid reaction    GOGO (acute kidney injury) (HCC)    Acute post-operative pain    At risk for constipation    At risk for delirium    Advance care planning    Physical deconditioning    History of CVA (cerebrovascular accident)    Chronic nasal congestion    Elevated LFTs    Vitamin B12 deficiency     Past Medical History:   Diagnosis Date    Anemia     Arthritis     Body mass index (BMI) 40.0-44.9, adult (HCC)     Breast cancer (HCC) 12/17/2018    Cancer (HCC)     right breast, colon, liver, right tonsil    Cervical lymphadenopathy     CT neck on 3/30/2023- Large right level 2A lymphadenopathy as described above and suspicious for neoplasm.  Correlation with histopathology is recommended.  Mild asymmetric prominence of the right palatine tonsil with otherwise no definitive nodular enhancing lesions identified along the course of the aerodigestive tract.     5/26/23- FNA of this node was nonrevealing for tissue etiology, but it d    Encounter for screening  for HIV 07/07/2022    Follow-up examination 04/04/2023    GERD (gastroesophageal reflux disease)     Hyperlipidemia     Hypertension     Obesity     Stroke (HCC)     TIA     Stroke (HCC)     TIA     Transaminitis 09/22/2021    Vasomotor rhinitis     Refilled flonase today. Stopped taking since January 2022     Past Surgical History:   Procedure Laterality Date    BREAST BIOPSY Right 11/23/2018    us guided bx cancer    BREAST SURGERY      COLONOSCOPY      ESOPHAGOSCOPY N/A 07/20/2023    Procedure: ESOPHAGOSCOPY;  Surgeon: David Chapa MD;  Location[facility];  Service: ENT    HEMICOLOECTOMY W/ ANASTOMOSIS Right 3/12/2024    Procedure: RIGHT COLECTOMY WITH ROBOTICS;  Surgeon: Vickie Israel MD;  Location: [facility];  Service: Surgical Oncology    IR BIOPSY LIVER MASS  06/27/2023    IR CRYOABLATION  6/3/2024    IR PORT CHECK  01/03/2024    IR PORT PLACEMENT  07/28/2023    IR PORT STRIPPING  01/09/2024    LAPAROTOMY N/A 3/12/2024    Procedure: LAPAROTOMY EXPLORATORY W/ ROBOTICS;  Surgeon: Vickie Israel MD;  Location: [facility];  Service: Surgical Oncology    IL [facility] INCL FLUOR GDNCE DX W/CELL WASHG SPX N/A 07/20/2023    Procedure: BRONCHOSCOPY;  Surgeon: David Chapa MD;  [facility];  Service: ENT    IL LARYNGOSCOPY W/BIOPSY MICROSCOPE/TELESCOPE N/A 07/20/2023    Procedure: MICRODIRECT LARYNGOSCOPY WITH BIOPSY;  Surgeon: David Chapa MD;  [facility]: [facility];  Service: ENT    IL MASTECTOMY PARTIAL W/AXILLARY LYMPHADENECTOMY Right 12/20/2018    Procedure: ULTRASOUND LOCALIZED PARTIAL MASTECTOMY W/SENTINEL NODE BIOPSY POSS AXILLARY DISSECTION;  Surgeon: Zahida Coronado MD;  Location: [facility];  Service: General    TUBAL LIGATION      US GUIDED BREAST BIOPSY RIGHT COMPLETE Right 11/23/2018    US GUIDED INJECTION FOR RESEARCH STUDY  12/20/2018    US GUIDED INJECTION FOR RESEARCH STUDY  12/07/2018    US GUIDED INJECTION FOR RESEARCH STUDY  05/03/2019    US GUIDED LYMPH NODE BIOPSY  RIGHT  05/26/2023     Family History   Problem Relation Age of Onset    Colon cancer Mother 58    Hypertension Mother     Pancreatic cancer Father 60    Hypertension Daughter     Hypertension Son      Social History     Socioeconomic History    Marital status: Single     Spouse name: Not on file    Number of children: Not on file    Years of education: Not on file    Highest education level: Not on file   Occupational History    Not on file   Tobacco Use    Smoking status: Never     Passive exposure: Never    Smokeless tobacco: Never    Tobacco comments:     NO TOBACCO USE   Vaping Use    Vaping status: Never Used   Substance and Sexual Activity    Alcohol use: Never    Drug use: Never    Sexual activity: Not on file   Other Topics Concern    Not on file   Social History Narrative    Not on file     Social Determinants of Health     Financial Resource Strain: Low Risk  (10/9/2023)    Overall Financial Resource Strain (CARDIA)     Difficulty of Paying Living Expenses: Not hard at all   Food Insecurity: No Food Insecurity (3/13/2024)    Hunger Vital Sign     Worried About Running Out of Food in the Last Year: Never true     Ran Out of Food in the Last Year: Never true   Transportation Needs: No Transportation Needs (3/13/2024)    PRAPARE - Transportation     Lack of Transportation (Medical): No     Lack of Transportation (Non-Medical): No   Physical Activity: Not on file   Stress: Not on file   Social Connections: Not on file   Intimate Partner Violence: Not on file   Housing Stability: Low Risk  (3/13/2024)    Housing Stability Vital Sign     Unable to Pay for Housing in the Last Year: No     Number of Times Moved in the Last Year: 1     Homeless in the Last Year: No       Current Outpatient Medications:     anastrozole (ARIMIDEX) 1 mg tablet, Take 1 tablet (1 mg total) by mouth daily, Disp: 90 tablet, Rfl: 3    atorvastatin (LIPITOR) 40 mg tablet, Take 1 tablet (40 mg total) by mouth daily at bedtime, Disp: 30  tablet, Rfl: 2    docusate sodium (COLACE) 50 mg capsule, Take 1 capsule (50 mg total) by mouth 2 (two) times a day, Disp: 90 capsule, Rfl: 1    ergocalciferol (VITAMIN D2) 50,000 units, Take 1 capsule (50,000 Units total) by mouth once a week, Disp: 90 capsule, Rfl: 3    ferrous sulfate 325 (65 Fe) mg tablet, Take 1 tablet (325 mg total) by mouth daily with breakfast, Disp: 30 tablet, Rfl: 0    folic acid (FOLVITE) 1 mg tablet, Take 1 tablet (1 mg total) by mouth in the morning, Disp: 90 tablet, Rfl: 3    gabapentin (NEURONTIN) 300 mg capsule, Take 1 pills in the morning, 1 pill at lunch and 2 pills before bed, Disp: 120 capsule, Rfl: 3    hydrocortisone 1 % ointment, , Disp: , Rfl:     lidocaine-prilocaine (EMLA) cream, Apply topically as needed for mild pain, Disp: 30 g, Rfl: 3    losartan (COZAAR) 50 mg tablet, Take 1 tablet (50 mg total) by mouth 2 (two) times a day, Disp: 60 tablet, Rfl: 0    mirtazapine (REMERON) 15 mg tablet, Take 1 tablet (15 mg total) by mouth daily at bedtime Patient is also on a 30 mg tablet, for a total dose of 45 mg, Disp: 30 tablet, Rfl: 3    mirtazapine (REMERON) 30 mg tablet, Take 1 tablet (30 mg total) by mouth daily at bedtime, Disp: 30 tablet, Rfl: 3    omeprazole (PriLOSEC) 20 mg delayed release capsule, Take 1 capsule (20 mg total) by mouth daily, Disp: 30 capsule, Rfl: 5    ondansetron (ZOFRAN) 8 mg tablet, Take 1 tablet (8 mg total) by mouth every 8 (eight) hours as needed for nausea or vomiting, Disp: 30 tablet, Rfl: 3    potassium chloride (Klor-Con M20) 20 mEq tablet, Take 1 tablet (20 mEq total) by mouth 2 (two) times a day, Disp: 60 tablet, Rfl: 1    prochlorperazine (COMPAZINE) 10 mg tablet, Take 1 tablet (10 mg total) by mouth every 6 (six) hours as needed for nausea or vomiting, Disp: 30 tablet, Rfl: 3    pyridoxine (VITAMIN B6) 50 mg tablet, Take 1 tablet (50 mg total) by mouth daily, Disp: 90 tablet, Rfl: 0    vitamin B-12 (VITAMIN B-12) 1,000 mcg tablet, , Disp: ,  Rfl:   No current facility-administered medications for this visit.    Facility-Administered Medications Ordered in Other Visits:     alteplase (CATHFLO) injection 2 mg, 2 mg, Intracatheter, Q1MIN PRN, Harika Agostino, DO    dextrose 5 % infusion, 20 mL/hr, Intravenous, Once, Harika Agostino, DO    sodium chloride 0.9 % infusion, 20 mL/hr, Intravenous, Once PRN, Harika Agostino, DO, Stopped at 07/01/24 1420  No Known Allergies    Vitals:    07/03/24 1105   BP: 110/76   Pulse: 96   Resp: 18   Temp: (!) 97.3 °F (36.3 °C)   SpO2: 99%       Physical Exam   General: Appears well, appears stated age  Skin: Warm, anicteric  HEENT: Normocephalic, atraumatic; sclera aniceteric, mucous membranes moist; cervical nodes without adenopathy. RIGHT neck firm nodule at inf margin of mandible appreciated  Cardiopulmonary: RRR, Easy WOB, no BLE edema  Abd: Flat and soft, nontender, no masses appreciated, no hepatosplenomegaly  MSK: Symmetric, no cyanosis, no overt weakness  Lymphatic: No cervical, axillary or inguinal lymphadenopathy  Neuro: Affect appropriate, no gross motor abnormalities      Pathology:  Pending    Labs: Reviewed in EPIC    Imaging  CT soft tissue neck w contrast    Result Date: 4/4/2023  Narrative: CT NECK WITH CONTRAST INDICATION:   I88.9: Nonspecific lymphadenitis, unspecified.  Right-sided neck swelling for one month. COMPARISON:  None. TECHNIQUE:  Axial, sagittal, and coronal 2D reformatted images were created from the axial source data and submitted for interpretation. Radiation dose length product (DLP) for this visit:  352 mGy-cm .  This examination, like all CT scans performed in the [facility], was performed utilizing techniques to minimize radiation dose exposure, including the use of iterative reconstruction and automated exposure control. IV Contrast:  85 mL of iohexol (OMNIPAQUE) IMAGE QUALITY:  Diagnostic. FINDINGS: VISUALIZED BRAIN PARENCHYMA:  No acute intracranial pathology of the  visualized brain parenchyma. VISUALIZED ORBITS: Normal visualized orbits. PARANASAL SINUSES: There is chronic opacification of the right sphenoid sinus with surrounding chronic osteitis.  The medial bony wall along the petrous portion of the right internal carotid artery also appears dehiscent with the adjacent sphenoid sinus.   NASAL CAVITY AND NASOPHARYNX:  Normal. SUPRAHYOID NECK:  There is mild asymmetric prominence of the right palatine tonsil with otherwise no discrete underlying nodular enhancing lesion.  Lingual tonsils appear grossly unremarkable. INFRAHYOID NECK:  Aryepiglottic folds and piriform sinuses are normal.  Normal glottis and subglottic airway. THYROID GLAND:  There is a right thyroid lobe nodule measuring 1.5 x 1.7 cm on series 3, image 47. PAROTID AND SUBMANDIBULAR GLANDS:  Normal. LYMPH NODES:  There is a large right level 2A lymph node with internal cystic change measuring 2.2 x 3.7 x 4.7 cm.  This results in mass effect and anterior displacement of the right submandibular gland. VASCULAR STRUCTURES:  Normal enhancement of the cervical vasculature. THORACIC INLET:  Lung apices and upper mediastinum are unremarkable. BONY STRUCTURES: No acute fracture or destructive osseous lesion.     Impression: Large right level 2A lymphadenopathy as described above and suspicious for neoplasm.  Correlation with histopathology is recommended. Mild asymmetric prominence of the right palatine tonsil with otherwise no definitive nodular enhancing lesions identified along the course of the aerodigestive tract. Heterogeneous right thyroid lobe nodule.  Ultrasound is recommended for further evaluation.  I personally discussed this study with ERICA ANNE on 4/4/2023 at 9:21 AM. Workstation performed: [id number]       I independently reviewed and interpreted the above laboratory and imaging data, including CT of the neck, breast history, pathology, thyroid US. ENT notes, op notes, path, med onc notes,  recent PET.       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 On systemic treatment and has completed 12 cycles folfox. Was on nivo and maintenance 5FU and f/u PET 1/2024 with activity only in colon mass which was more avid than before; was also having abd discomfort and sx c/w partial obstruction. Now s/p RT robo hemicolectomy and doing very well. Path with 0/49 nodes positive, T3 tumor with basically zero response to chemo. Given low burden of disease, we decided on cryo for her single liver lesion. She is now on maintenance 5FU and nivo. Plan for PET in July. I will maintain contact with Dr Medina re: liver response and overall tumor burden.

## 2024-07-03 NOTE — LETTER
July 3, 2024     Harika Medina DO  [address]   [address]  [facility] [address]    Patient: Maira Moura   YOB: 1955   Date of Visit: 7/3/2024       Dear Dr. Medina:    Thank you for referring Maira Moura to me for evaluation. Below are my notes for this consultation.    If you have questions, please do not hesitate to call me. I look forward to following your patient along with you.         Sincerely,        Vickie Israel MD        CC: MD Evan Strong MD Jacquelyn S Carr, MD  7/3/2024 11:37 AM  Sign when Signing Visit  Surgical Oncology Consultation F/U    [facility] [place]  [address] [facility] [address]  [phone]    Patient:  Maira Moura  1955  [id number]    Primary Care provider:  Fanta Turner MD  [address]  [facility] [address]    Referring provider:  No referring provider defined for this encounter.      ASSESSMENT AND PLAN    Diagnoses and all orders for this visit:    Metastatic colon cancer to liver (HCC)    1. Metastatic colon cancer to liver (HCC)  Assessment & Plan:  This is a 68-year-old female with metastatic colon ca to liver and tonsillar SCC met to nodes. On systemic treatment and has completed 12 cycles folfox. Was on nivo and maintenance 5FU and f/u PET 1/2024 with activity only in colon mass which was more avid than before; was also having abd discomfort and sx c/w partial obstruction. Now s/p RT robo hemicolectomy and doing very well. Path with 0/49 nodes positive, T3 tumor with basically zero response to chemo. Given low burden of disease, we decided on cryo for her single liver lesion. She is now on maintenance 5FU and nivo. Plan for PET in July. I will maintain contact with Dr Medina re: liver response and overall tumor burden. I will not sched f/u at this time until PET results.   2. Right tonsillar squamous cell  carcinoma (HCC)  Overview:  CT neck on 3/30/2023- Large right level 2A lymphadenopathy as described above and suspicious for neoplasm.  Correlation with histopathology is recommended.  Mild asymmetric prominence of the right palatine tonsil with otherwise no definitive nodular enhancing lesions identified along the course of the aerodigestive tract.    5/26/23- FNA of this node was nonrevealing for tissue etiology, but it did reveal carcinoma. Suspicious for malignancy.  Clusters of atypical epithelioid cells in a background of lymphoid cells, suspicious for carcinoma.Satisfactory for evaluation.  Assessment & Plan:  Will contact DR Plummer / Sammi given that colon cancer is stable to determine whether additional surveillance scope etc needs to be obtained.         Chief Complaint   Patient presents with   • Follow-up       No follow-ups on file.    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, unresectable    7/31/2023 - FOLFOX    11/20/2023 - opdivo added to FOLFOX    12/18/2023-cycle 11-20% dose reduction of 5-FU bolus and oxaliplatin due to increased fatigue    Jan 2024 - oxaliplatin removed from treatment plan due to neuropathy - PET scan shows good disease control in all areas except colon lesion    3/2024 - right hemicolectomy - pT3N0    6/3/24 - cryoablation to liver (no interruption to systemic therapy)     Malignant neoplasm of right female breast (HCC)   11/23/2018 Initial Diagnosis    Malignant neoplasm of right female breast (HCC)     11/23/2018 Biopsy    Rt Breast US BX 1100 8cmfn, 4 passes 12g Marquee:  - Invasive breast carcinoma of no special type (ductal NST/invasive ductal carcinoma).  - grade 2  - %  - SD 95%  - HER2 negative     12/20/2018 Surgery    Right  "partial mastectomy and SLN biopsy:  Infiltrating ductal carcinoma, Grade 2/3, felt to be between 2.0 cm and 2.5 cm in greatest dimension  - No invasive tumor is seen at inked margins, but there is a focus of DCIS seen within approximately 1mm of the inked medial margin  - no LVI  - no LCIS  - 0/2 LN's  at least Stage IIA - pT2, pN0, cM0, G2.    - Dr Coronado     12/20/2018 -  Cancer Staged    Stage IIA - pT2, pN0, cM0, G2.       1/4/2019 Genomic Testing    Oncotype DX score: 13     2/1/2019 -  Hormone Therapy    anastrozole 1 mg daily as adjuvant endocrine therapy    - Dr Daley       2/14/2019 - 4/3/2019 Radiation    Course: C1    Plan ID Energy Fractions Dose per Fraction (cGy) Dose Correction (cGy) Total Dose Delivered (cGy) Elapsed Days   R Breast 10X/6X 25 / 25 200 0 5,000 40   R BRST BOOST 16E 5 / 5 200 0 1,000 7      Treatment dates:  C1: 2/14/2019 - 4/3/2019       Metastatic colon cancer to liver (HCC)   7/6/2023 Genetic Testing    CARIS Results:   KRAS Pathogenic Variant Exon 2  p.G12D : lack of benefit of cetuximab, panitumumab Level 2   No other actionable mutation; MSI stable; TMB low   MiFOLOXAi Results: \"Decreased Benefit of FOLFOX + Bevacizumab in first line metastatic CRC.  This patient may achieve improved results by receiving an alternative to FOLFOX, such as FOLFIRI, as their initial regimen. As an adjustment to frontline FOLFOXIRI following toxicity: This patient may achieve improved results by removing the oxaliplatin portion of their regimen\".         7/11/2023 Initial Diagnosis    Metastatic colon cancer to liver (HCC)     7/13/2023 -  Cancer Staged    Staging form: Colon and Rectum, AJCC 8th Edition  - Clinical stage from 7/13/2023: cT3, cN0, pM1 - Signed by Harika Medina DO on 9/28/2023  Total positive nodes: 0       7/31/2023 -  Chemotherapy    cyanocobalamin, 1,000 mcg, Intramuscular, Every 30 days, 5 of 9 cycles  Administration: 1,000 mcg (5/9/2024), 1,000 mcg (5/24/2024), 1,000 mcg " (6/20/2024)  potassium chloride, 20 mEq, Intravenous, Once, 2 of 2 cycles  Administration: 20 mEq (11/6/2023), 20 mEq (11/20/2023)  alteplase (CATHFLO), 2 mg, Intracatheter, Every 1 Minute as needed, 19 of 23 cycles  Administration: 2 mg (1/3/2024)  pegfilgrastim (NEULASTA), 6 mg, Subcutaneous, Once, 12 of 12 cycles  Administration: 6 mg (10/25/2023), 6 mg (11/8/2023), 6 mg (11/22/2023), 6 mg (12/6/2023), 6 mg (12/20/2023), 6 mg (1/12/2024), 6 mg (1/25/2024), 6 mg (4/25/2024), 6 mg (5/9/2024), 6 mg (5/24/2024), 6 mg (6/20/2024)  fluorouracil (ADRUCIL), 895 mg, Intravenous, Once, 19 of 23 cycles  Dose modification: 320 mg/m2 (original dose 400 mg/m2, Cycle 11)  Administration: 900 mg (7/31/2023), 900 mg (8/14/2023), 900 mg (8/28/2023), 900 mg (9/11/2023), 900 mg (9/25/2023), 900 mg (10/9/2023), 900 mg (10/23/2023), 895 mg (11/6/2023), 895 mg (11/20/2023), 895 mg (12/4/2023), 700 mg (12/18/2023), 680 mg (1/10/2024), 680 mg (1/23/2024), 625 mg (4/23/2024), 625 mg (5/7/2024), 625 mg (5/22/2024), 625 mg (6/4/2024), 625 mg (6/18/2024), 625 mg (7/1/2024)  nivolumab (OPDIVO) IVPB, 240 mg (100 % of original dose 240 mg), Intravenous, Once, 11 of 15 cycles  Dose modification: 240 mg (original dose 240 mg, Cycle 9)  Administration: 240 mg (11/20/2023), 240 mg (12/4/2023), 240 mg (12/18/2023), 240 mg (1/10/2024), 240 mg (1/23/2024), 240 mg (4/23/2024), 240 mg (5/7/2024), 240 mg (5/22/2024), 240 mg (6/4/2024), 240 mg (6/18/2024), 240 mg (7/1/2024)  leucovorin calcium IVPB, 896 mg, Intravenous, Once, 19 of 23 cycles  Administration: 900 mg (7/31/2023), 900 mg (8/14/2023), 900 mg (8/28/2023), 900 mg (9/11/2023), 900 mg (9/25/2023), 900 mg (10/9/2023), 900 mg (10/23/2023), 900 mg (11/6/2023), 900 mg (11/20/2023), 900 mg (12/4/2023), 900 mg (12/18/2023), 850 mg (1/10/2024), 850 mg (1/23/2024), 800 mg (4/23/2024), 800 mg (5/7/2024), 800 mg (5/22/2024), 800 mg (6/4/2024), 800 mg (6/18/2024), 800 mg (7/1/2024)  oxaliplatin (ELOXATIN)  chemo infusion, 85 mg/m2 = 190.4 mg, Intravenous, Once, 12 of 12 cycles  Dose modification: 68 mg/m2 (original dose 85 mg/m2, Cycle 11, Reason: Other (Must fill in a comment), Comment: increased fatigue)  Administration: 190.4 mg (7/31/2023), 190.4 mg (8/14/2023), 200 mg (8/28/2023), 200 mg (9/11/2023), 200 mg (9/25/2023), 200 mg (10/9/2023), 200 mg (10/23/2023), 200 mg (11/6/2023), 200 mg (11/20/2023), 190.4 mg (12/4/2023), 150 mg (12/18/2023), 150 mg (1/10/2024)  fluorouracil (ADRUCIL) ambulatory infusion Soln, 1,200 mg/m2/day = 5,375 mg, Intravenous, Over 46 hours, 19 of 23 cycles     9/26/2023 -  Cancer Staged    Staging form: Colon and Rectum, AJCC 8th Edition  - Pathologic: pT3, pN0, cM1 - Signed by Vickie Israel MD on 4/3/2024  Total positive nodes: 0       3/12/2024 Surgery    A. Terminal ileum, appendix, right colon (right hemicolectomy):  - Adenocarcinoma (5.2cm)  - Forty-nine (49) lymph nodes negative for carcinoma (0/49)    - Acellular mucin present in one (1) lymph node   - See staging synoptic (ypT3N0)     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HCC)   7/27/2023 Initial Diagnosis    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HCC)     7/27/2023 -  Cancer Staged    Staging form: Pharynx - Oropharynx, AJCC 8th Edition  - Clinical stage from 7/27/2023: Stage III (cT1, cN3, cM0, p16+) - Signed by Evan Plummer MD on 7/27/2023  Stage prefix: Initial diagnosis           History of Present Illness  :   This is a 68-year-old female seen today with a new right neck nodule.  Briefly, the patient presented to her primary care physician due to what she describes as throat soreness.  A large right neck nodule was identified, prompting a CT scan of the neck.  This demonstrated a near 5 cm right level 2 lymph node with multicystic components.  It also detected a right heterogeneous thyroid nodule of about 2 cm which did not appear to be locally invasive. The patient denies any significant symptoms of  throat pain, trouble breathing, trouble swallowing, pain with swallowing, voice changes.  She does describe a sore throat for months if not years.  She states that the right sided neck nodule became enlarged over the last couple of months but it has not really bothered her.  She does have a history of right breast cancer treated with breast conservation in 2018.  Her mammograms since that time have been benign.  She denies any other lumps or bumps of the left neck, axilla, inguinal regions.  She denies any fevers, chills, night sweats, weight loss, other systemic symptoms.    At this juncture, given that she does not have any symptoms concerning for lymphoma and her enlarged lymph nodes are limited to the right neck where she also has a right thyroid nodule, I am concerned about a lymph node involved thyroid cancer.  I would like her to undergo an FNA of the right neck node to determine a baseline histology.  Likewise, she will need to undergo the thyroid ultrasound she is scheduled for as well as a potential right thyroid biopsy depending on these results.    Interval 6/7/23  Patient presents today for interval follow-up after the above investigations.  Thyroid ultrasound revealed a spongiform nodule with questionable criteria for biopsy.  Percutaneous FNA of the enlarged right cervical node revealed carcinoma, however, they were not able to determine tissue of etiology given the scant tissue.  The patient's symptoms are largely unchanged.    8/2023  Two cycles FOLFOX completed for metastatic colon ca to liver. Also diagnosed with SCC at tonsil with mets to nodes. Plan for eventual XRT it appears. Doing well - swallowing well. Poor appetite but working on it. No abd pain, n/v, trouble with BM    1/2024  Pt seen in f/u. She is status post 12 total cycles of FOLFOX with nivolumab added in November.  PET scan prior to this visit shows good control of all areas of disease with the exception of increase in the primary  colon mass. Her SCC is now un-detectable by PET - she has not received XRT for this. 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 Last 5FU nivo one week ago. Does suffer with constipation. No blood per rectum.     Today we discussed robo right colectomy and risks to include infection, bleeding, anastomotic leak.  The patient has not had surgery within her abdomen before and thus her risks for completion robotically are low, however, she is obese and this does increase her risks.  I like to discuss with Dr. Medina what it would look like to come off therapy.  Likewise, she will be discussed at our tumor board.  Finally, I like to discuss management of her squamous cell cancer and whether the Nivo has been effective for this and if holding it will be risky for recurrence.  Will plan on robotic right colectomy in roughly 5 weeks which would be 6 weeks from her most recent treatment.  She will also need cardiac clearance given her history.      3/2024  Discussed at  as well as with Dr Medina and will plan on right colon. Cardiac eval obtained and pt at acceptable level of risk without need for further testing.     4/2024  S/p robo right colon. Had hard recovery 2/2 preop deconditioning but is now home and doing great. One BM every 3 days and taking stool softeners. Eating well and getting stronger.     7/2024  On maintenance 5FU and nivo. Ablated single liver lesion in June - of note this was larger than previous at time of ablation. Was seen in ED shortly thereafter with dizziness following infusion. Today she is doing well. Needs daily stool softeners and every other day laxative. No complaints. No blood in her stool. Saw Dr Medina last mo and is scheduled for PET in July.    Review of Systems  Complete ROS Surg Onc:   Constitutional: The patient denies new or recent history of general fatigue, no recent weight loss, no change in appetite.   Eyes: No complaints of visual  problems, no scleral icterus.   ENT: No complaints of ear pain, no hoarseness, no difficulty swallowing,  no tinnitus and no new masses in head, oral cavity, or neck.   Cardiovascular: No complaints of chest pain, no palpitations, no ankle edema.   Respiratory: No complaints of shortness of breath, no cough.   Gastrointestinal: No complaints of jaundice, no bloody stools, no pale stools.   Genitourinary: No complaints of dysuria, no hematuria, no nocturia, no frequent urination, no urethral discharge.   Musculoskeletal: No complaints of weakness, paralysis, joint stiffness or arthralgias.  Integumentary: No complaints of rash, no new lesions.   Neurological: No complaints of convulsions, no seizures, no dizziness.   Hematologic/Lymphatic: No complaints of easy bruising.   Endocrine:  No hot or cold intolerance.  No polydipsia, polyphagia, or polyuria.  Allergy/immunology:  No environmental allergies.  No food allergies.  Not immunocompromised.      Patient Active Problem List   Diagnosis   • Primary hypertension   • Mixed hyperlipidemia   • Malignant neoplasm of right female breast (HCC)   • Vitamin D deficiency   • Prediabetes   • Right thyroid nodule   • GERD (gastroesophageal reflux disease)   • Obesity   • Metastatic colon cancer to liver (HCC)   • 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HCC)   • Poor appetite   • Nutritional anemia   • Dehydration   • Drug-induced constipation   • Chemotherapy induced neutropenia (HCC)   • Stage 3a chronic kidney disease (HCC)   • Thrombocytopenia (HCC)   • Neuropathy   • Diastolic dysfunction   • Hypokalemia   • Leukemoid reaction   • GOGO (acute kidney injury) (HCC)   • Acute post-operative pain   • At risk for constipation   • At risk for delirium   • Advance care planning   • Physical deconditioning   • History of CVA (cerebrovascular accident)   • Chronic nasal congestion   • Elevated LFTs   • Vitamin B12 deficiency     Past Medical History:   Diagnosis Date   • Anemia     • Arthritis    • Body mass index (BMI) 40.0-44.9, adult (HCC)    • Breast cancer (HCC) 12/17/2018   • Cancer (HCC)     right breast, colon, liver, right tonsil   • Cervical lymphadenopathy     CT neck on 3/30/2023- Large right level 2A lymphadenopathy as described above and suspicious for neoplasm.  Correlation with histopathology is recommended.  Mild asymmetric prominence of the right palatine tonsil with otherwise no definitive nodular enhancing lesions identified along the course of the aerodigestive tract.     5/26/23- FNA of this node was nonrevealing for tissue etiology, but it d   • Encounter for screening for HIV 07/07/2022   • Follow-up examination 04/04/2023   • GERD (gastroesophageal reflux disease)    • Hyperlipidemia    • Hypertension    • Obesity    • Stroke (HCC)     TIA    • Stroke (HCC)     TIA    • Transaminitis 09/22/2021   • Vasomotor rhinitis     Refilled flonase today. Stopped taking since January 2022     Past Surgical History:   Procedure Laterality Date   • BREAST BIOPSY Right 11/23/2018    us guided bx cancer   • BREAST SURGERY     • COLONOSCOPY     • ESOPHAGOSCOPY N/A 07/20/2023    Procedure: ESOPHAGOSCOPY;  Surgeon: David Chapa MD;  [facility];  Service: ENT   • HEMICOLOECTOMY W/ ANASTOMOSIS Right 3/12/2024    Procedure: RIGHT COLECTOMY WITH ROBOTICS;  Surgeon: Vickie Israel MD;  [facility];  Service: Surgical Oncology   • IR BIOPSY LIVER MASS  06/27/2023   • IR CRYOABLATION  6/3/2024   • IR PORT CHECK  01/03/2024   • IR PORT PLACEMENT  07/28/2023   • IR PORT STRIPPING  01/09/2024   • LAPAROTOMY N/A 3/12/2024    Procedure: LAPAROTOMY EXPLORATORY W/ ROBOTICS;  Surgeon: Vickie Israel MD;  [facility];  Service: Surgical Oncology   • AZ BRNCHSC INCL FLUOR GDNCE DX W/CELL WASHG SPX N/A 07/20/2023    Procedure: BRONCHOSCOPY;  Surgeon: David Chapa MD;  [facility];  Service: ENT   • AZ LARYNGOSCOPY W/BIOPSY MICROSCOPE/TELESCOPE N/A  07/20/2023    Procedure: MICRODIRECT LARYNGOSCOPY WITH BIOPSY;  Surgeon: David Chapa MD;  [facility];  Service: ENT   • VA MASTECTOMY PARTIAL W/AXILLARY LYMPHADENECTOMY Right 12/20/2018    Procedure: ULTRASOUND LOCALIZED PARTIAL MASTECTOMY W/SENTINEL NODE BIOPSY POSS AXILLARY DISSECTION;  Surgeon: Zahida Coronado MD;  Location: [facility];  Service: General   • TUBAL LIGATION     • US GUIDED BREAST BIOPSY RIGHT COMPLETE Right 11/23/2018   • US GUIDED INJECTION FOR RESEARCH STUDY  12/20/2018   • US GUIDED INJECTION FOR RESEARCH STUDY  12/07/2018   • US GUIDED INJECTION FOR RESEARCH STUDY  05/03/2019   • US GUIDED LYMPH NODE BIOPSY RIGHT  05/26/2023     Family History   Problem Relation Age of Onset   • Colon cancer Mother 58   • Hypertension Mother    • Pancreatic cancer Father 60   • Hypertension Daughter    • Hypertension Son      Social History     Socioeconomic History   • Marital status: Single     Spouse name: Not on file   • Number of children: Not on file   • Years of education: Not on file   • Highest education level: Not on file   Occupational History   • Not on file   Tobacco Use   • Smoking status: Never     Passive exposure: Never   • Smokeless tobacco: Never   • Tobacco comments:     NO TOBACCO USE   Vaping Use   • Vaping status: Never Used   Substance and Sexual Activity   • Alcohol use: Never   • Drug use: Never   • Sexual activity: Not on file   Other Topics Concern   • Not on file   Social History Narrative   • Not on file     Social Determinants of Health     Financial Resource Strain: Low Risk  (10/9/2023)    Overall Financial Resource Strain (CARDIA)    • Difficulty of Paying Living Expenses: Not hard at all   Food Insecurity: No Food Insecurity (3/13/2024)    Hunger Vital Sign    • Worried About Running Out of Food in the Last Year: Never true    • Ran Out of Food in the Last Year: Never true   Transportation Needs: No Transportation Needs (3/13/2024)    PRAPARE -  Transportation    • Lack of Transportation (Medical): No    • Lack of Transportation (Non-Medical): No   Physical Activity: Not on file   Stress: Not on file   Social Connections: Not on file   Intimate Partner Violence: Not on file   Housing Stability: Low Risk  (3/13/2024)    Housing Stability Vital Sign    • Unable to Pay for Housing in the Last Year: No    • Number of Times Moved in the Last Year: 1    • Homeless in the Last Year: No       Current Outpatient Medications:   •  anastrozole (ARIMIDEX) 1 mg tablet, Take 1 tablet (1 mg total) by mouth daily, Disp: 90 tablet, Rfl: 3  •  atorvastatin (LIPITOR) 40 mg tablet, Take 1 tablet (40 mg total) by mouth daily at bedtime, Disp: 30 tablet, Rfl: 2  •  docusate sodium (COLACE) 50 mg capsule, Take 1 capsule (50 mg total) by mouth 2 (two) times a day, Disp: 90 capsule, Rfl: 1  •  ergocalciferol (VITAMIN D2) 50,000 units, Take 1 capsule (50,000 Units total) by mouth once a week, Disp: 90 capsule, Rfl: 3  •  ferrous sulfate 325 (65 Fe) mg tablet, Take 1 tablet (325 mg total) by mouth daily with breakfast, Disp: 30 tablet, Rfl: 0  •  folic acid (FOLVITE) 1 mg tablet, Take 1 tablet (1 mg total) by mouth in the morning, Disp: 90 tablet, Rfl: 3  •  gabapentin (NEURONTIN) 300 mg capsule, Take 1 pills in the morning, 1 pill at lunch and 2 pills before bed, Disp: 120 capsule, Rfl: 3  •  hydrocortisone 1 % ointment, , Disp: , Rfl:   •  lidocaine-prilocaine (EMLA) cream, Apply topically as needed for mild pain, Disp: 30 g, Rfl: 3  •  losartan (COZAAR) 50 mg tablet, Take 1 tablet (50 mg total) by mouth 2 (two) times a day, Disp: 60 tablet, Rfl: 0  •  mirtazapine (REMERON) 15 mg tablet, Take 1 tablet (15 mg total) by mouth daily at bedtime Patient is also on a 30 mg tablet, for a total dose of 45 mg, Disp: 30 tablet, Rfl: 3  •  mirtazapine (REMERON) 30 mg tablet, Take 1 tablet (30 mg total) by mouth daily at bedtime, Disp: 30 tablet, Rfl: 3  •  omeprazole (PriLOSEC) 20 mg delayed  release capsule, Take 1 capsule (20 mg total) by mouth daily, Disp: 30 capsule, Rfl: 5  •  ondansetron (ZOFRAN) 8 mg tablet, Take 1 tablet (8 mg total) by mouth every 8 (eight) hours as needed for nausea or vomiting, Disp: 30 tablet, Rfl: 3  •  potassium chloride (Klor-Con M20) 20 mEq tablet, Take 1 tablet (20 mEq total) by mouth 2 (two) times a day, Disp: 60 tablet, Rfl: 1  •  prochlorperazine (COMPAZINE) 10 mg tablet, Take 1 tablet (10 mg total) by mouth every 6 (six) hours as needed for nausea or vomiting, Disp: 30 tablet, Rfl: 3  •  pyridoxine (VITAMIN B6) 50 mg tablet, Take 1 tablet (50 mg total) by mouth daily, Disp: 90 tablet, Rfl: 0  •  vitamin B-12 (VITAMIN B-12) 1,000 mcg tablet, , Disp: , Rfl:   No current facility-administered medications for this visit.    Facility-Administered Medications Ordered in Other Visits:   •  alteplase (CATHFLO) injection 2 mg, 2 mg, Intracatheter, Q1MIN PRN, Harika Agostino, DO  •  dextrose 5 % infusion, 20 mL/hr, Intravenous, Once, Harika Agostino, DO  •  sodium chloride 0.9 % infusion, 20 mL/hr, Intravenous, Once PRN, Harika Agostino, DO, Stopped at 07/01/24 1420  No Known Allergies    Vitals:    07/03/24 1105   BP: 110/76   Pulse: 96   Resp: 18   Temp: (!) 97.3 °F (36.3 °C)   SpO2: 99%       Physical Exam   General: Appears well, appears stated age  Skin: Warm, anicteric  HEENT: Normocephalic, atraumatic; sclera aniceteric, mucous membranes moist; cervical nodes without adenopathy. RIGHT neck firm nodule at inf margin of mandible appreciated  Cardiopulmonary: RRR, Easy WOB, no BLE edema  Abd: Flat and soft, nontender, no masses appreciated, no hepatosplenomegaly  MSK: Symmetric, no cyanosis, no overt weakness  Lymphatic: No cervical, axillary or inguinal lymphadenopathy  Neuro: Affect appropriate, no gross motor abnormalities      Pathology:  Pending    Labs: Reviewed in EPIC    Imaging  CT soft tissue neck w contrast    Result Date: 4/4/2023  Narrative: CT NECK WITH  CONTRAST INDICATION:   I88.9: Nonspecific lymphadenitis, unspecified.  Right-sided neck swelling for one month. COMPARISON:  None. TECHNIQUE:  Axial, sagittal, and coronal 2D reformatted images were created from the axial source data and submitted for interpretation. Radiation dose length product (DLP) for this visit:  352 mGy-cm .  This examination, like all CT scans performed in the [facility], was performed utilizing techniques to minimize radiation dose exposure, including the use of iterative reconstruction and automated exposure control. IV Contrast:  85 mL of iohexol (OMNIPAQUE) IMAGE QUALITY:  Diagnostic. FINDINGS: VISUALIZED BRAIN PARENCHYMA:  No acute intracranial pathology of the visualized brain parenchyma. VISUALIZED ORBITS: Normal visualized orbits. PARANASAL SINUSES: There is chronic opacification of the right sphenoid sinus with surrounding chronic osteitis.  The medial bony wall along the petrous portion of the right internal carotid artery also appears dehiscent with the adjacent sphenoid sinus.   NASAL CAVITY AND NASOPHARYNX:  Normal. SUPRAHYOID NECK:  There is mild asymmetric prominence of the right palatine tonsil with otherwise no discrete underlying nodular enhancing lesion.  Lingual tonsils appear grossly unremarkable. INFRAHYOID NECK:  Aryepiglottic folds and piriform sinuses are normal.  Normal glottis and subglottic airway. THYROID GLAND:  There is a right thyroid lobe nodule measuring 1.5 x 1.7 cm on series 3, image 47. PAROTID AND SUBMANDIBULAR GLANDS:  Normal. LYMPH NODES:  There is a large right level 2A lymph node with internal cystic change measuring 2.2 x 3.7 x 4.7 cm.  This results in mass effect and anterior displacement of the right submandibular gland. VASCULAR STRUCTURES:  Normal enhancement of the cervical vasculature. THORACIC INLET:  Lung apices and upper mediastinum are unremarkable. BONY STRUCTURES: No acute fracture or destructive osseous lesion.      Impression: Large right level 2A lymphadenopathy as described above and suspicious for neoplasm.  Correlation with histopathology is recommended. Mild asymmetric prominence of the right palatine tonsil with otherwise no definitive nodular enhancing lesions identified along the course of the aerodigestive tract. Heterogeneous right thyroid lobe nodule.  Ultrasound is recommended for further evaluation.  I personally discussed this study with ERICA ANNE on 4/4/2023 at 9:21 AM. Workstation performed: [id number]       I independently reviewed and interpreted the above laboratory and imaging data, including CT of the neck, breast history, pathology, thyroid US. ENT notes, op notes, path, med onc notes, recent PET.       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 On systemic treatment and has completed 12 cycles folfox. Was on nivo and maintenance 5FU and f/u PET 1/2024 with activity only in colon mass which was more avid than before; was also having abd discomfort and sx c/w partial obstruction. Now s/p RT robo hemicolectomy and doing very well. Path with 0/49 nodes positive, T3 tumor with basically zero response to chemo. Given low burden of disease, we decided on cryo for her single liver lesion. She is now on maintenance 5FU and nivo. Plan for PET in July. I will maintain contact with Dr Medina re: liver response and overall tumor burden.

## 2024-07-08 ENCOUNTER — TELEPHONE (OUTPATIENT)
Dept: CARDIOLOGY CLINIC | Facility: CLINIC | Age: 69
End: 2024-07-08

## 2024-07-10 DIAGNOSIS — C78.7 METASTATIC COLON CANCER TO LIVER (HCC): Primary | ICD-10-CM

## 2024-07-10 DIAGNOSIS — C18.9 METASTATIC COLON CANCER TO LIVER (HCC): Primary | ICD-10-CM

## 2024-07-10 DIAGNOSIS — E53.8 VITAMIN B12 DEFICIENCY: ICD-10-CM

## 2024-07-12 ENCOUNTER — OFFICE VISIT (OUTPATIENT)
Dept: CARDIOLOGY CLINIC | Facility: CLINIC | Age: 69
End: 2024-07-12
Payer: MEDICARE

## 2024-07-12 VITALS
WEIGHT: 193.4 LBS | DIASTOLIC BLOOD PRESSURE: 70 MMHG | SYSTOLIC BLOOD PRESSURE: 114 MMHG | OXYGEN SATURATION: 100 % | HEIGHT: 66 IN | BODY MASS INDEX: 31.08 KG/M2 | HEART RATE: 82 BPM

## 2024-07-12 DIAGNOSIS — Z79.899 HIGH RISK MEDICATION USE: ICD-10-CM

## 2024-07-12 DIAGNOSIS — R42 LIGHTHEADEDNESS: ICD-10-CM

## 2024-07-12 DIAGNOSIS — E78.2 MIXED HYPERLIPIDEMIA: ICD-10-CM

## 2024-07-12 DIAGNOSIS — I10 PRIMARY HYPERTENSION: Primary | ICD-10-CM

## 2024-07-12 DIAGNOSIS — I51.89 DIASTOLIC DYSFUNCTION: ICD-10-CM

## 2024-07-12 PROCEDURE — 99214 OFFICE O/P EST MOD 30 MIN: CPT | Performed by: INTERNAL MEDICINE

## 2024-07-12 RX ORDER — LOSARTAN POTASSIUM 50 MG/1
50 TABLET ORAL DAILY
Start: 2024-07-12

## 2024-07-12 NOTE — PROGRESS NOTES
Cardio-Oncology Clinic Note    Maira Moura 69 y.o. 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  Right sided.  S/p XRT  Colon cancer  Dx 2023  Metastatic to liver  S/p surgery 3/2024  S/p cryoablation of right hepatic metastatic lesion 6-2024  Current therapy:  Opdivo (nivolumab) and 5-FU  anastrazole    # Diastolic dysfunction on echo  Not unexpected echo finding given age, HTN, obesity  Reports mild MOSHER  Exam -  remains euvolemic  BNP - normal  No medical therapy for this unless clinical CHF / volume overload    # lightheadedness  positional.   Improved with better hydration - stay hydrated    # HTN  Losartan  50mg  daily  BP  well controlled    # HLD  Of note, does have coronary calcifications on CT  On Lipitor 40mg qhs  repeated lipids on statin  - LDL improved to 97 (from 150)     # Obesity  BMI 31  (has trended down recently)    # Hx TIA  2017.    # High risk medication use   immunotherapy - risk of myocarditis.  baseline troponin was normal.   5-FU - risk of coronary vasospasm.  Continue to monitor      Today's Plan Summary:  See above assessment/plan for full details of today's plan.  Briefly,     No medication changes.               Reason For Visit / Chief Complaint:  F/u diastolic dysfunction, HTN, HLD    HPI:   Maira Moura is a 69 y.o.  female with history as noted in the problem list and further detailed in the above assessment and plan.    Initial:   Dec 2023  Referred by Dr. Medina for positional lightheadedness and diastolic dysfunction on echo.  As above, the patient has a history of breast cancer and more recently colon cancer and squamous cell carcinoma of the tonsil.  The patient has been started on immunotherapy and FOLFOX.  At the recent oncology visit she reported some positional lightheadedness.  An echo was  obtained demonstrating diastolic dysfunction.   Today, the patient reports - fatigue is main complaint.   Also has lightheadedness.   Episodes last a few minutes.  Can be when just laying there.  Can also be positional.   Describes sx's as a dizziness.  Sx's started before chemotherapy but worse after starting.   No syncope.   No CP.   Some SOB.  Some MOSHER.  No palpitations.    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   Single.  5 kids.    Interval:  Last visit -->    fatigue.  No longer having positional dizziness.     Plan last visit -->   Fasting lipids  Troponin    Labs - 04/24/24   - repeat lipids (on statin)  -  LDL improved to 97 (from 150)  - troponin -  not elevated    S/p cryoablation of right hepatic metastatic lesion 6-2024    Continues on Opdivo (nivolumab) and 5-FU    Today - feeling well.  No CP.  No SOB.  Dizziness doing well -only occasional.  No syncope since event at infusion center 6-6-24.  No edema.          Cardiac Imaging personally reviewed:  EKG 6/2023  NSR    12-12-23  Sinus tachycardia at 104 bpm.  Otherwise, normal EKG.       Holter or event monitor    Echo 10/2023  Mild LVH.  EF 65% (on my reivew).  Grade 1 diastolic dysfunction.  Normal RV size and function.  No significant valve disease         JAZMYN    Cardiac MRI    Stress testing    Coronary CTA or [facility]    RHC    CPET              Patient Active Problem List    Diagnosis Date Noted   • Vitamin B12 deficiency 05/08/2024   • Elevated LFTs 03/25/2024   • Chronic nasal congestion 03/18/2024   • Hypokalemia 03/15/2024   • Leukemoid reaction 03/15/2024   • GOGO (acute kidney injury) (HCC) 03/15/2024   • Acute post-operative pain 03/15/2024   • At risk for constipation 03/15/2024   • At risk for delirium 03/15/2024   • Advance care planning 03/15/2024   • Physical deconditioning 03/15/2024   • History of CVA (cerebrovascular accident) 03/15/2024   • Diastolic dysfunction 01/12/2024   • Neuropathy 01/11/2024   • Thrombocytopenia (HCC)  12/29/2023   • Stage 3a chronic kidney disease (HCC) 12/12/2023   • Chemotherapy induced neutropenia (HCC) 11/09/2023   • Dehydration 10/06/2023   • Drug-induced constipation 10/06/2023   • Nutritional anemia 08/24/2023   • Poor appetite 08/03/2023   • 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HCC) 07/27/2023   • Metastatic colon cancer to liver (HCC) 07/11/2023   • GERD (gastroesophageal reflux disease)    • Obesity    • Right thyroid nodule 04/04/2023   • Prediabetes 07/07/2022   • Vitamin D deficiency 01/25/2019   • Malignant neoplasm of right female breast (HCC) 12/20/2018   • Mixed hyperlipidemia 08/09/2018   • Primary hypertension 07/27/2017       Past Medical History:   Diagnosis Date   • Anemia    • Arthritis    • Body mass index (BMI) 40.0-44.9, adult (HCC)    • Breast cancer (HCC) 12/17/2018   • Cancer (HCC)     right breast, colon, liver, right tonsil   • Cervical lymphadenopathy     CT neck on 3/30/2023- Large right level 2A lymphadenopathy as described above and suspicious for neoplasm.  Correlation with histopathology is recommended.  Mild asymmetric prominence of the right palatine tonsil with otherwise no definitive nodular enhancing lesions identified along the course of the aerodigestive tract.     5/26/23- FNA of this node was nonrevealing for tissue etiology, but it d   • Encounter for screening for HIV 07/07/2022   • Follow-up examination 04/04/2023   • GERD (gastroesophageal reflux disease)    • Hyperlipidemia    • Hypertension    • Obesity    • Stroke (HCC)     TIA    • Stroke (HCC)     TIA    • Transaminitis 09/22/2021   • Vasomotor rhinitis     Refilled flonase today. Stopped taking since January 2022       No Known Allergies    Current Outpatient Medications   Medication Instructions   • anastrozole (ARIMIDEX) 1 mg, Oral, Daily   • atorvastatin (LIPITOR) 40 mg, Oral, Daily at bedtime   • docusate sodium (COLACE) 50 mg, Oral, 2 times daily   • ergocalciferol (VITAMIN D2) 50,000 Units, Oral,  Weekly   • ferrous sulfate 325 mg, Oral, Daily with breakfast   • [START ON 7/16/2024] fluorouracil 4,680 mg in CADD/Elastomeric Infusion Device 1,200 mg/m2/day, Intravenous, Over 46 hours   • folic acid (FOLVITE) 1 mg, Oral, Daily   • gabapentin (NEURONTIN) 300 mg capsule Take 1 pills in the morning, 1 pill at lunch and 2 pills before bed   • hydrocortisone 1 % ointment    • lidocaine-prilocaine (EMLA) cream Topical, As needed   • losartan (COZAAR) 50 mg, Oral, Daily   • mirtazapine (REMERON) 15 mg, Oral, Daily at bedtime, Patient is also on a 30 mg tablet, for a total dose of 45 mg   • mirtazapine (REMERON) 30 mg, Oral, Daily at bedtime   • omeprazole (PRILOSEC) 20 mg, Oral, Daily   • ondansetron (ZOFRAN) 8 mg, Oral, Every 8 hours PRN   • potassium chloride (Klor-Con M20) 20 mEq tablet 20 mEq, Oral, 2 times daily   • prochlorperazine (COMPAZINE) 10 mg, Oral, Every 6 hours PRN   • vitamin B-12 (VITAMIN B-12) 1,000 mcg tablet        Social History     Socioeconomic History   • Marital status: Single     Spouse name: Not on file   • Number of children: Not on file   • Years of education: Not on file   • Highest education level: Not on file   Occupational History   • Not on file   Tobacco Use   • Smoking status: Never     Passive exposure: Never   • Smokeless tobacco: Never   • Tobacco comments:     NO TOBACCO USE   Vaping Use   • Vaping status: Never Used   Substance and Sexual Activity   • Alcohol use: Never   • Drug use: Never   • Sexual activity: Not on file   Other Topics Concern   • Not on file   Social History Narrative   • Not on file     Social Determinants of Health     Financial Resource Strain: Low Risk  (10/9/2023)    Overall Financial Resource Strain (CARDIA)    • Difficulty of Paying Living Expenses: Not hard at all   Food Insecurity: No Food Insecurity (3/13/2024)    Hunger Vital Sign    • Worried About Running Out of Food in the Last Year: Never true    • Ran Out of Food in the Last Year: Never true  "  Transportation Needs: No Transportation Needs (3/13/2024)    PRAPARE - Transportation    • Lack of Transportation (Medical): No    • Lack of Transportation (Non-Medical): No   Physical Activity: Not on file   Stress: Not on file   Social Connections: Not on file   Intimate Partner Violence: Not on file   Housing Stability: Low Risk  (3/13/2024)    Housing Stability Vital Sign    • Unable to Pay for Housing in the Last Year: No    • Number of Times Moved in the Last Year: 1    • Homeless in the Last Year: No       Family History   Problem Relation Age of Onset   • Colon cancer Mother 58   • Hypertension Mother    • Pancreatic cancer Father 60   • Hypertension Daughter    • Hypertension Son        Physical Exam:  Blood pressure 114/70, pulse 82, height 5' 6\" (1.676 m), weight 87.7 kg (193 lb 6.4 oz), SpO2 100%, not currently breastfeeding.  Body mass index is 31.22 kg/m².  Wt Readings from Last 3 Encounters:   07/12/24 87.7 kg (193 lb 6.4 oz)   07/03/24 87.5 kg (193 lb)   07/01/24 86.8 kg (191 lb 5.8 oz)     Physical Exam  Vitals reviewed.   Constitutional:       General: She is not in acute distress.     Appearance: She is not toxic-appearing.   Neck:      Comments: JVP is not elevated  Cardiovascular:      Rate and Rhythm: Normal rate and regular rhythm.      Heart sounds: No murmur heard.     No friction rub. No gallop.   Pulmonary:      Breath sounds: Normal breath sounds. No wheezing, rhonchi or rales.   Musculoskeletal:      Right lower leg: No edema.      Left lower leg: No edema.   Neurological:      Mental Status: She is alert.         Labs & Results:  Lab Results   Component Value Date    SODIUM 139 06/27/2024    K 3.7 06/27/2024     06/27/2024    CO2 24 06/27/2024    BUN 13 06/27/2024    CREATININE 0.98 06/27/2024    GLUC 118 06/27/2024    CALCIUM 9.5 06/27/2024     No results found for: \"NTBNP\"       Thank you for the opportunity to participate in the care of this patient.    Matthew Whitfield MD, " FACC  Staff Cardiologist  Director of Cardio-Oncology  [facility]

## 2024-07-15 ENCOUNTER — HOSPITAL ENCOUNTER (OUTPATIENT)
Dept: INFUSION CENTER | Facility: CLINIC | Age: 69
Discharge: HOME | End: 2024-07-15
Payer: MEDICARE

## 2024-07-15 DIAGNOSIS — C78.7 METASTATIC COLON CANCER TO LIVER (HCC): Primary | ICD-10-CM

## 2024-07-15 DIAGNOSIS — C18.9 METASTATIC COLON CANCER TO LIVER (HCC): Primary | ICD-10-CM

## 2024-07-15 DIAGNOSIS — E53.8 VITAMIN B12 DEFICIENCY: ICD-10-CM

## 2024-07-15 LAB
ALBUMIN SERPL BCG-MCNC: 3.2 G/DL (ref 3.5–5)
ALP SERPL-CCNC: 123 U/L (ref 34–104)
ALT SERPL W P-5'-P-CCNC: 17 U/L (ref 7–52)
ANION GAP SERPL CALCULATED.3IONS-SCNC: 6 MMOL/L (ref 4–13)
AST SERPL W P-5'-P-CCNC: 29 U/L (ref 13–39)
BASOPHILS # BLD AUTO: 0.01 THOUSANDS/ÂΜL (ref 0–0.1)
BASOPHILS NFR BLD AUTO: 0 % (ref 0–1)
BILIRUB SERPL-MCNC: 0.25 MG/DL (ref 0.2–1)
BUN SERPL-MCNC: 13 MG/DL (ref 5–25)
CALCIUM ALBUM COR SERPL-MCNC: 9.6 MG/DL (ref 8.3–10.1)
CALCIUM SERPL-MCNC: 9 MG/DL (ref 8.4–10.2)
CHLORIDE SERPL-SCNC: 107 MMOL/L (ref 96–108)
CO2 SERPL-SCNC: 26 MMOL/L (ref 21–32)
CREAT SERPL-MCNC: 0.79 MG/DL (ref 0.6–1.3)
EOSINOPHIL # BLD AUTO: 0.2 THOUSAND/ÂΜL (ref 0–0.61)
EOSINOPHIL NFR BLD AUTO: 5 % (ref 0–6)
ERYTHROCYTE [DISTWIDTH] IN BLOOD BY AUTOMATED COUNT: 17.5 % (ref 11.6–15.1)
GFR SERPL CREATININE-BSD FRML MDRD: 77 ML/MIN/1.73SQ M
GLUCOSE SERPL-MCNC: 102 MG/DL (ref 65–140)
HCT VFR BLD AUTO: 26.5 % (ref 34.8–46.1)
HGB BLD-MCNC: 8.5 G/DL (ref 11.5–15.4)
IMM GRANULOCYTES # BLD AUTO: 0.01 THOUSAND/UL (ref 0–0.2)
IMM GRANULOCYTES NFR BLD AUTO: 0 % (ref 0–2)
LYMPHOCYTES # BLD AUTO: 1.15 THOUSANDS/ÂΜL (ref 0.6–4.47)
LYMPHOCYTES NFR BLD AUTO: 31 % (ref 14–44)
MCH RBC QN AUTO: 29 PG (ref 26.8–34.3)
MCHC RBC AUTO-ENTMCNC: 32.1 G/DL (ref 31.4–37.4)
MCV RBC AUTO: 90 FL (ref 82–98)
MONOCYTES # BLD AUTO: 0.39 THOUSAND/ÂΜL (ref 0.17–1.22)
MONOCYTES NFR BLD AUTO: 10 % (ref 4–12)
NEUTROPHILS # BLD AUTO: 2 THOUSANDS/ÂΜL (ref 1.85–7.62)
NEUTS SEG NFR BLD AUTO: 54 % (ref 43–75)
NRBC BLD AUTO-RTO: 0 /100 WBCS
PLATELET # BLD AUTO: 188 THOUSANDS/UL (ref 149–390)
PMV BLD AUTO: 10.1 FL (ref 8.9–12.7)
POTASSIUM SERPL-SCNC: 3.8 MMOL/L (ref 3.5–5.3)
PROT SERPL-MCNC: 6.7 G/DL (ref 6.4–8.4)
RBC # BLD AUTO: 2.93 MILLION/UL (ref 3.81–5.12)
SODIUM SERPL-SCNC: 139 MMOL/L (ref 135–147)
TSH SERPL DL<=0.05 MIU/L-ACNC: 1.79 UIU/ML (ref 0.45–4.5)
WBC # BLD AUTO: 3.76 THOUSAND/UL (ref 4.31–10.16)

## 2024-07-15 PROCEDURE — 36593 DECLOT VASCULAR DEVICE: CPT

## 2024-07-15 PROCEDURE — 84443 ASSAY THYROID STIM HORMONE: CPT | Performed by: INTERNAL MEDICINE

## 2024-07-15 PROCEDURE — 85025 COMPLETE CBC W/AUTO DIFF WBC: CPT | Performed by: INTERNAL MEDICINE

## 2024-07-15 PROCEDURE — 80053 COMPREHEN METABOLIC PANEL: CPT | Performed by: INTERNAL MEDICINE

## 2024-07-15 RX ADMIN — ALTEPLASE 2 MG: 2.2 INJECTION, POWDER, LYOPHILIZED, FOR SOLUTION INTRAVENOUS at 11:43

## 2024-07-15 NOTE — PROGRESS NOTES
Patient presents for central labs. Port Cath accessed without positive blood return. Cathflo given per protocol. After 30mins, Port A Cath with positive blood return. Labs collected per protocol. Patient declined AVS but she is aware of appointment on 7/16/24 at 12PM.

## 2024-07-15 NOTE — PROGRESS NOTES
Maira Moura  tolerated lab draw well with no complications.      Maira Moura is aware of future appt on Tuesday Jul 16, 2024 12:00 PM .     AVS declined by Maira Moura.

## 2024-07-16 ENCOUNTER — HOSPITAL ENCOUNTER (OUTPATIENT)
Dept: INFUSION CENTER | Facility: CLINIC | Age: 69
Discharge: HOME | End: 2024-07-16
Payer: MEDICARE

## 2024-07-16 VITALS
HEART RATE: 101 BPM | HEIGHT: 66 IN | TEMPERATURE: 98.9 F | DIASTOLIC BLOOD PRESSURE: 82 MMHG | BODY MASS INDEX: 31.53 KG/M2 | WEIGHT: 196.21 LBS | SYSTOLIC BLOOD PRESSURE: 126 MMHG | RESPIRATION RATE: 18 BRPM

## 2024-07-16 DIAGNOSIS — C78.7 METASTATIC COLON CANCER TO LIVER (HCC): ICD-10-CM

## 2024-07-16 DIAGNOSIS — C18.9 METASTATIC COLON CANCER TO LIVER (HCC): ICD-10-CM

## 2024-07-16 DIAGNOSIS — E53.8 VITAMIN B12 DEFICIENCY: Primary | ICD-10-CM

## 2024-07-16 PROCEDURE — 96361 HYDRATE IV INFUSION ADD-ON: CPT

## 2024-07-16 PROCEDURE — 96413 CHEMO IV INFUSION 1 HR: CPT

## 2024-07-16 PROCEDURE — 96367 TX/PROPH/DG ADDL SEQ IV INF: CPT

## 2024-07-16 PROCEDURE — G0498 CHEMO EXTEND IV INFUS W/PUMP: HCPCS

## 2024-07-16 PROCEDURE — 96411 CHEMO IV PUSH ADDL DRUG: CPT

## 2024-07-16 RX ORDER — FLUOROURACIL 50 MG/ML
320 INJECTION, SOLUTION INTRAVENOUS ONCE
Status: COMPLETED | OUTPATIENT
Start: 2024-07-16 | End: 2024-07-16

## 2024-07-16 RX ORDER — SODIUM CHLORIDE 9 MG/ML
20 INJECTION, SOLUTION INTRAVENOUS ONCE AS NEEDED
Status: DISCONTINUED | OUTPATIENT
Start: 2024-07-16 | End: 2024-07-19 | Stop reason: HOSPADM

## 2024-07-16 RX ORDER — DEXTROSE MONOHYDRATE 50 MG/ML
20 INJECTION, SOLUTION INTRAVENOUS ONCE
Status: COMPLETED | OUTPATIENT
Start: 2024-07-16 | End: 2024-07-16

## 2024-07-16 RX ADMIN — FLUOROURACIL 625 MG: 50 INJECTION, SOLUTION INTRAVENOUS at 15:42

## 2024-07-16 RX ADMIN — DEXAMETHASONE SODIUM PHOSPHATE: 100 INJECTION INTRAMUSCULAR; INTRAVENOUS at 13:42

## 2024-07-16 RX ADMIN — SODIUM CHLORIDE 1000 ML: 0.9 INJECTION, SOLUTION INTRAVENOUS at 12:36

## 2024-07-16 RX ADMIN — SODIUM CHLORIDE 240 MG: 9 INJECTION, SOLUTION INTRAVENOUS at 14:15

## 2024-07-16 RX ADMIN — DEXTROSE 20 ML/HR: 5 SOLUTION INTRAVENOUS at 14:49

## 2024-07-16 RX ADMIN — LEUCOVORIN CALCIUM 800 MG: 500 INJECTION, POWDER, LYOPHILIZED, FOR SOLUTION INTRAMUSCULAR; INTRAVENOUS at 14:56

## 2024-07-16 NOTE — PROGRESS NOTES
Maira Moura  tolerated treatment well with no complications.      Maira Moura is aware of future appt onThursday Jul 18, 2024 2:00 PM .     AVS declined by Maira Moura.

## 2024-07-18 ENCOUNTER — HOSPITAL ENCOUNTER (OUTPATIENT)
Dept: INFUSION CENTER | Facility: CLINIC | Age: 69
Discharge: HOME | End: 2024-07-18
Payer: MEDICARE

## 2024-07-18 VITALS
SYSTOLIC BLOOD PRESSURE: 118 MMHG | HEART RATE: 82 BPM | RESPIRATION RATE: 18 BRPM | TEMPERATURE: 97.5 F | DIASTOLIC BLOOD PRESSURE: 78 MMHG

## 2024-07-18 DIAGNOSIS — C18.9 METASTATIC COLON CANCER TO LIVER (HCC): ICD-10-CM

## 2024-07-18 DIAGNOSIS — E53.8 VITAMIN B12 DEFICIENCY: Primary | ICD-10-CM

## 2024-07-18 DIAGNOSIS — C78.7 METASTATIC COLON CANCER TO LIVER (HCC): ICD-10-CM

## 2024-07-18 PROCEDURE — 96360 HYDRATION IV INFUSION INIT: CPT

## 2024-07-18 PROCEDURE — 96372 THER/PROPH/DIAG INJ SC/IM: CPT

## 2024-07-18 RX ORDER — CYANOCOBALAMIN 1000 UG/ML
1000 INJECTION, SOLUTION INTRAMUSCULAR; SUBCUTANEOUS
Status: DISCONTINUED | OUTPATIENT
Start: 2024-07-18 | End: 2024-07-21 | Stop reason: HOSPADM

## 2024-07-18 RX ADMIN — CYANOCOBALAMIN 1000 MCG: 1000 INJECTION, SOLUTION INTRAMUSCULAR at 15:33

## 2024-07-18 RX ADMIN — SODIUM CHLORIDE 1000 ML: 0.9 INJECTION, SOLUTION INTRAVENOUS at 14:18

## 2024-07-18 NOTE — PROGRESS NOTES
Pt. Denies new symptoms or concerns today. 5FU completed via [facility].  Pt. Tolerated without adverse event.  Hydration 1000 NSS provided as ordered with Vitamin B12 given in Lt deltoid.  Pt. Will return 7/30/24 at 1200 for labs with treatment scheduled on 8/1/24.  AVS provided.

## 2024-07-23 ENCOUNTER — TELEPHONE (OUTPATIENT)
Dept: FAMILY MEDICINE CLINIC | Facility: CLINIC | Age: 69
End: 2024-07-23

## 2024-07-26 ENCOUNTER — HOSPITAL ENCOUNTER (OUTPATIENT)
Dept: RADIOLOGY | Age: 69
Discharge: HOME | End: 2024-07-26

## 2024-07-26 DIAGNOSIS — E53.8 VITAMIN B12 DEFICIENCY: ICD-10-CM

## 2024-07-26 DIAGNOSIS — C78.7 METASTATIC COLON CANCER TO LIVER (HCC): Primary | ICD-10-CM

## 2024-07-26 DIAGNOSIS — C18.9 METASTATIC COLON CANCER TO LIVER (HCC): Primary | ICD-10-CM

## 2024-07-29 ENCOUNTER — HOSPITAL ENCOUNTER (OUTPATIENT)
Dept: INFUSION CENTER | Facility: CLINIC | Age: 69
Discharge: HOME | End: 2024-07-29
Payer: MEDICARE

## 2024-07-29 ENCOUNTER — PATIENT OUTREACH (OUTPATIENT)
Dept: HEMATOLOGY ONCOLOGY | Facility: CLINIC | Age: 69
End: 2024-07-29

## 2024-07-29 DIAGNOSIS — E53.8 VITAMIN B12 DEFICIENCY: ICD-10-CM

## 2024-07-29 DIAGNOSIS — C18.9 METASTATIC COLON CANCER TO LIVER (HCC): Primary | ICD-10-CM

## 2024-07-29 DIAGNOSIS — C78.7 METASTATIC COLON CANCER TO LIVER (HCC): Primary | ICD-10-CM

## 2024-07-29 LAB
ALBUMIN SERPL BCG-MCNC: 3.2 G/DL (ref 3.5–5)
ALP SERPL-CCNC: 142 U/L (ref 34–104)
ALT SERPL W P-5'-P-CCNC: 31 U/L (ref 7–52)
ANION GAP SERPL CALCULATED.3IONS-SCNC: 4 MMOL/L (ref 4–13)
AST SERPL W P-5'-P-CCNC: 45 U/L (ref 13–39)
BASOPHILS # BLD AUTO: 0.03 THOUSANDS/ÂΜL (ref 0–0.1)
BASOPHILS NFR BLD AUTO: 1 % (ref 0–1)
BILIRUB SERPL-MCNC: 0.29 MG/DL (ref 0.2–1)
BUN SERPL-MCNC: 14 MG/DL (ref 5–25)
CALCIUM ALBUM COR SERPL-MCNC: 9.7 MG/DL (ref 8.3–10.1)
CALCIUM SERPL-MCNC: 9.1 MG/DL (ref 8.4–10.2)
CHLORIDE SERPL-SCNC: 107 MMOL/L (ref 96–108)
CO2 SERPL-SCNC: 26 MMOL/L (ref 21–32)
CREAT SERPL-MCNC: 0.75 MG/DL (ref 0.6–1.3)
EOSINOPHIL # BLD AUTO: 0.24 THOUSAND/ÂΜL (ref 0–0.61)
EOSINOPHIL NFR BLD AUTO: 5 % (ref 0–6)
ERYTHROCYTE [DISTWIDTH] IN BLOOD BY AUTOMATED COUNT: 17 % (ref 11.6–15.1)
GFR SERPL CREATININE-BSD FRML MDRD: 82 ML/MIN/1.73SQ M
GLUCOSE SERPL-MCNC: 107 MG/DL (ref 65–140)
HCT VFR BLD AUTO: 28.4 % (ref 34.8–46.1)
HGB BLD-MCNC: 9.2 G/DL (ref 11.5–15.4)
IMM GRANULOCYTES # BLD AUTO: 0.01 THOUSAND/UL (ref 0–0.2)
IMM GRANULOCYTES NFR BLD AUTO: 0 % (ref 0–2)
LYMPHOCYTES # BLD AUTO: 1.42 THOUSANDS/ÂΜL (ref 0.6–4.47)
LYMPHOCYTES NFR BLD AUTO: 27 % (ref 14–44)
MCH RBC QN AUTO: 28.8 PG (ref 26.8–34.3)
MCHC RBC AUTO-ENTMCNC: 32.4 G/DL (ref 31.4–37.4)
MCV RBC AUTO: 89 FL (ref 82–98)
MONOCYTES # BLD AUTO: 0.48 THOUSAND/ÂΜL (ref 0.17–1.22)
MONOCYTES NFR BLD AUTO: 9 % (ref 4–12)
NEUTROPHILS # BLD AUTO: 3.04 THOUSANDS/ÂΜL (ref 1.85–7.62)
NEUTS SEG NFR BLD AUTO: 58 % (ref 43–75)
NRBC BLD AUTO-RTO: 0 /100 WBCS
PLATELET # BLD AUTO: 199 THOUSANDS/UL (ref 149–390)
PMV BLD AUTO: 9.7 FL (ref 8.9–12.7)
POTASSIUM SERPL-SCNC: 3.4 MMOL/L (ref 3.5–5.3)
PROT SERPL-MCNC: 7.2 G/DL (ref 6.4–8.4)
RBC # BLD AUTO: 3.19 MILLION/UL (ref 3.81–5.12)
SODIUM SERPL-SCNC: 137 MMOL/L (ref 135–147)
TSH SERPL DL<=0.05 MIU/L-ACNC: 1.72 UIU/ML (ref 0.45–4.5)
WBC # BLD AUTO: 5.22 THOUSAND/UL (ref 4.31–10.16)

## 2024-07-29 PROCEDURE — 80053 COMPREHEN METABOLIC PANEL: CPT | Performed by: INTERNAL MEDICINE

## 2024-07-29 PROCEDURE — 84443 ASSAY THYROID STIM HORMONE: CPT | Performed by: INTERNAL MEDICINE

## 2024-07-29 PROCEDURE — 85025 COMPLETE CBC W/AUTO DIFF WBC: CPT | Performed by: INTERNAL MEDICINE

## 2024-07-29 NOTE — PROGRESS NOTES
Port accessed for central labs. Labs drawn without complication and sent. Port flushed per protocol. AVS declined, aware to return tomorrow @ 12:00, left unit in stable condition.    surgical precautions/pain/decreased strength/decreased ROM/impaired balance

## 2024-07-30 ENCOUNTER — PATIENT OUTREACH (OUTPATIENT)
Dept: HEMATOLOGY ONCOLOGY | Facility: CLINIC | Age: 69
End: 2024-07-30

## 2024-07-30 ENCOUNTER — HOSPITAL ENCOUNTER (OUTPATIENT)
Dept: INFUSION CENTER | Facility: CLINIC | Age: 69
Discharge: HOME | End: 2024-07-30
Payer: MEDICARE

## 2024-07-30 VITALS — HEIGHT: 66 IN | WEIGHT: 193.56 LBS | BODY MASS INDEX: 31.11 KG/M2

## 2024-07-30 DIAGNOSIS — E53.8 VITAMIN B12 DEFICIENCY: Primary | ICD-10-CM

## 2024-07-30 DIAGNOSIS — C18.9 METASTATIC COLON CANCER TO LIVER (HCC): ICD-10-CM

## 2024-07-30 DIAGNOSIS — C78.7 METASTATIC COLON CANCER TO LIVER (HCC): ICD-10-CM

## 2024-07-30 PROCEDURE — G0498 CHEMO EXTEND IV INFUS W/PUMP: HCPCS

## 2024-07-30 PROCEDURE — 96367 TX/PROPH/DG ADDL SEQ IV INF: CPT

## 2024-07-30 PROCEDURE — 96413 CHEMO IV INFUSION 1 HR: CPT

## 2024-07-30 PROCEDURE — 96411 CHEMO IV PUSH ADDL DRUG: CPT

## 2024-07-30 RX ORDER — SODIUM CHLORIDE 9 MG/ML
20 INJECTION, SOLUTION INTRAVENOUS ONCE AS NEEDED
Status: DISCONTINUED | OUTPATIENT
Start: 2024-07-30 | End: 2024-08-02 | Stop reason: HOSPADM

## 2024-07-30 RX ORDER — DEXTROSE MONOHYDRATE 50 MG/ML
20 INJECTION, SOLUTION INTRAVENOUS ONCE
Status: COMPLETED | OUTPATIENT
Start: 2024-07-30 | End: 2024-07-30

## 2024-07-30 RX ORDER — FLUOROURACIL 50 MG/ML
320 INJECTION, SOLUTION INTRAVENOUS ONCE
Status: COMPLETED | OUTPATIENT
Start: 2024-07-30 | End: 2024-07-30

## 2024-07-30 RX ADMIN — DEXAMETHASONE SODIUM PHOSPHATE: 100 INJECTION INTRAMUSCULAR; INTRAVENOUS at 12:18

## 2024-07-30 RX ADMIN — SODIUM CHLORIDE 240 MG: 9 INJECTION, SOLUTION INTRAVENOUS at 12:53

## 2024-07-30 RX ADMIN — FLUOROURACIL 625 MG: 50 INJECTION, SOLUTION INTRAVENOUS at 14:12

## 2024-07-30 RX ADMIN — SODIUM CHLORIDE 20 ML/HR: 9 INJECTION, SOLUTION INTRAVENOUS at 12:15

## 2024-07-30 RX ADMIN — DEXTROSE 20 ML/HR: 5 SOLUTION INTRAVENOUS at 13:40

## 2024-07-30 RX ADMIN — SODIUM CHLORIDE 1000 ML: 9 INJECTION, SOLUTION INTRAVENOUS at 12:15

## 2024-07-30 RX ADMIN — LEUCOVORIN CALCIUM 800 MG: 500 INJECTION, POWDER, LYOPHILIZED, FOR SOLUTION INTRAMUSCULAR; INTRAVENOUS at 13:42

## 2024-07-30 NOTE — PROGRESS NOTES
Maira Moura  tolerated treatment well with no complications. 5FU elastomeric device connected and infusing as per order.     Maira Moura is aware of future appt on 8/1 at 1230.     AVS printed and given to Maira Moura:  Yes

## 2024-07-30 NOTE — PROGRESS NOTES
Pt called to confirm she has transportation to the infusion center today. I confirmed with her she is scheduled for an 11:50am  today. She was thankful for the assistance   E mail received from [facility] they have been trying to contact the pt. The pts son phone number was listed as the contact number.  I updated the pts information in round trip

## 2024-08-01 ENCOUNTER — HOSPITAL ENCOUNTER (OUTPATIENT)
Dept: INFUSION CENTER | Facility: CLINIC | Age: 69
Discharge: HOME | End: 2024-08-01
Payer: MEDICARE

## 2024-08-01 VITALS
DIASTOLIC BLOOD PRESSURE: 76 MMHG | HEART RATE: 92 BPM | TEMPERATURE: 96.6 F | RESPIRATION RATE: 18 BRPM | SYSTOLIC BLOOD PRESSURE: 121 MMHG

## 2024-08-01 DIAGNOSIS — C18.9 METASTATIC COLON CANCER TO LIVER (HCC): ICD-10-CM

## 2024-08-01 DIAGNOSIS — C78.7 METASTATIC COLON CANCER TO LIVER (HCC): ICD-10-CM

## 2024-08-01 DIAGNOSIS — E53.8 VITAMIN B12 DEFICIENCY: Primary | ICD-10-CM

## 2024-08-01 PROCEDURE — 96372 THER/PROPH/DIAG INJ SC/IM: CPT

## 2024-08-01 PROCEDURE — 96360 HYDRATION IV INFUSION INIT: CPT

## 2024-08-01 RX ORDER — CYANOCOBALAMIN 1000 UG/ML
1000 INJECTION, SOLUTION INTRAMUSCULAR; SUBCUTANEOUS
Status: DISCONTINUED | OUTPATIENT
Start: 2024-08-01 | End: 2024-08-04 | Stop reason: HOSPADM

## 2024-08-01 RX ADMIN — CYANOCOBALAMIN 1000 MCG: 1000 INJECTION, SOLUTION INTRAMUSCULAR at 13:38

## 2024-08-01 RX ADMIN — SODIUM CHLORIDE 1000 ML: 0.9 INJECTION, SOLUTION INTRAVENOUS at 12:40

## 2024-08-01 NOTE — PROGRESS NOTES
Maira Moura  tolerated B12 and hydration without incident.    Maira Moura is aware of future appt on 8/19 at 12:30pm.     AVS printed and given to Maira Moura:  Yes

## 2024-08-02 DIAGNOSIS — C78.7 METASTATIC COLON CANCER TO LIVER (HCC): ICD-10-CM

## 2024-08-02 DIAGNOSIS — E53.8 VITAMIN B12 DEFICIENCY: Primary | ICD-10-CM

## 2024-08-02 DIAGNOSIS — C18.9 METASTATIC COLON CANCER TO LIVER (HCC): ICD-10-CM

## 2024-08-05 ENCOUNTER — HOSPITAL ENCOUNTER (OUTPATIENT)
Dept: RADIOLOGY | Age: 69
Discharge: HOME | End: 2024-08-05
Payer: MEDICARE

## 2024-08-05 ENCOUNTER — PATIENT OUTREACH (OUTPATIENT)
Dept: HEMATOLOGY ONCOLOGY | Facility: CLINIC | Age: 69
End: 2024-08-05

## 2024-08-05 DIAGNOSIS — C18.9 METASTATIC COLON CANCER TO LIVER (HCC): ICD-10-CM

## 2024-08-05 DIAGNOSIS — C78.7 METASTATIC COLON CANCER TO LIVER (HCC): ICD-10-CM

## 2024-08-05 LAB — GLUCOSE SERPL-MCNC: 101 MG/DL (ref 65–140)

## 2024-08-05 PROCEDURE — 78815 PET IMAGE W/CT SKULL-THIGH: CPT

## 2024-08-05 PROCEDURE — A9552 F18 FDG: HCPCS

## 2024-08-05 PROCEDURE — 82948 REAGENT STRIP/BLOOD GLUCOSE: CPT

## 2024-08-05 NOTE — PROGRESS NOTES
Pt called asking to confirm her transportation for today. I confirmed that she is scheduled for  at 11:50 via [facility]  to [facility], she understood, and was thankful for the assistance   Requested a ride in round trip for pts medical oncology appointment for tomorrow 8/6/2024

## 2024-08-05 NOTE — LETTER
[facility]  [address] [address]      August 9, 2024    MRN: [id number]     Phone: [phone]     Dear Ms. Moura,    You recently had a(n) Nuclear Medicine performed on 8/5/2024 at  [facility] that was requested by Harika Medina DO. The study was reviewed by a radiologist, which is a physician who specializes in medical imaging. The radiologist issued a report describing his or her findings. In that report there was a finding that the radiologist felt warranted further discussion with your health care provider and that discussion would be beneficial to you.      The results were sent to Harika Medina DO on 08/06/2024  9:15 AM. We recommend that you contact Harika Medina DO at [phone] or set up an appointment to discuss the results of the imaging test. If you have already heard from Harika Medina DO regarding the results of your study, you can disregard this letter.     This letter is not meant to alarm you, but intended to encourage you to follow-up on your results with the provider that sent you for the imaging study. In addition, we have enclosed answers to frequently asked questions by other patients who have also received a letter to review results with their health care provider (see page two).      Thank you for choosing [facility] for your medical imaging needs.                                                                                                                                                        FREQUENTLY ASKED QUESTIONS    Why am I receiving this letter?  [place] State Law requires us to notify patients who have findings on imaging exams that may require more testing or follow-up with a health professional within the next 3 months.        How serious is the finding on the imaging test?  This letter is sent to all patients who may need follow-up or more testing within the next  3 months.  Receiving this letter does not necessarily mean you have a life-threatening imaging finding or disease.  Recommendations in the radiologist’s imaging report are general in nature and it is up to your healthcare provider to say whether those recommendations make sense for your situation.  You are strongly encouraged to talk to your health care provider about the results and ask whether additional steps need to be taken.    Where can I get a copy of the final report for my recent radiology exam?  To get a full copy of the report you can access your records online at [email or web address] or please contact [facility]’s Medical Records Department at [phone] Monday through Friday between 8 am and 6 pm.         What do I need to do now?           Please contact your health care provider who requested the imaging study to discuss what further actions (if any) are needed.  You may have already heard from (your ordering provider) in regard to this test in which case you can disregard this letter.        NOTICE IN ACCORDANCE WITH THE [place] STATE “PATIENT TEST RESULT INFORMATION ACT OF 2018”    You are receiving this notice as a result of a determination by your diagnostic imaging service that further discussions of your test results are warranted and would be beneficial to you.    The complete results of your test or tests have been or will be sent to the health care practitioner that ordered the test or tests. It is recommended that you contact your health care practitioner to discuss your results as soon as possible.

## 2024-08-06 ENCOUNTER — OFFICE VISIT (OUTPATIENT)
Dept: HEMATOLOGY ONCOLOGY | Facility: CLINIC | Age: 69
End: 2024-08-06
Payer: MEDICARE

## 2024-08-06 ENCOUNTER — PATIENT OUTREACH (OUTPATIENT)
Dept: HEMATOLOGY ONCOLOGY | Facility: CLINIC | Age: 69
End: 2024-08-06

## 2024-08-06 ENCOUNTER — TELEPHONE (OUTPATIENT)
Dept: HEMATOLOGY ONCOLOGY | Facility: CLINIC | Age: 69
End: 2024-08-06

## 2024-08-06 VITALS
BODY MASS INDEX: 32.65 KG/M2 | DIASTOLIC BLOOD PRESSURE: 70 MMHG | RESPIRATION RATE: 16 BRPM | SYSTOLIC BLOOD PRESSURE: 118 MMHG | HEIGHT: 65 IN | TEMPERATURE: 98 F | OXYGEN SATURATION: 96 % | WEIGHT: 196 LBS | HEART RATE: 75 BPM

## 2024-08-06 DIAGNOSIS — C09.9 RIGHT TONSILLAR SQUAMOUS CELL CARCINOMA (HCC): Primary | ICD-10-CM

## 2024-08-06 PROCEDURE — 99215 OFFICE O/P EST HI 40 MIN: CPT | Performed by: INTERNAL MEDICINE

## 2024-08-06 PROCEDURE — G2211 COMPLEX E/M VISIT ADD ON: HCPCS | Performed by: INTERNAL MEDICINE

## 2024-08-06 NOTE — PROGRESS NOTES
Medical Oncology: Progress Note     Primary Care Provider: Fanta Turner MD      MRN: [id number] : 1955    Reason for Encounter: Follow-Up Visit.  Interval Events: Maira Moura is a 69-year-old postmenopausal female, with a complex Oncologic history, including early-stage hormone-positive HER-2 negative right breast cancer (Status-post right lumpectomy with sentinel lymph node biopsy on 2018 and whole-breast radiation completed in 2019; On adjuvant endocrine-therapy with Anastrozole since 2019), as well as synchronous de naveed metastatic adenocarcinoma of the mid-ascending colon (Complicated by partial-obstruction requiring right hemicolectomy on 2024) with biopsy-proven oligometastatic disease to the liver (Status-post cryoablation on 2024), and unresectable (At least locally-advanced versus metastatic) 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     On evaluation this morning, patient states that she is doing well. She endorses improved oral-intake, and has sustained an intentional weight-gain of approximately 4-pounds since 2024. On further questioning, patient characterizes intermittent dysphagia to food-products. Otherwise, she continues to tolerate her activities of daily living appropriately, and independently. She ambulates without an assistive-device. Regarding ongoing systemic-therapy, patient reports residual peripheral neuropathy involving the bilateral upper and lower extremities, of which impairs fine-motor tasks (e.g. Drops objects, and frequently trips while ambulating). She notes that Gabapentin assists with paresthesias, and neuropathic-pain. Patient denies significant nausea, vomiting, or diarrhea. Of note, patient resides with her son, and daughter-in-law in [place]. She does not  drive ([employer]-transportation).    ECOG Performance Status: 1-2 points.    Review of Systems:  All systems reviewed and negative except otherwise listed in the History of Present Illness.    Oncology History Overview Note   2/2019 - pT2N0 breast cancer treated with anastrozole, oncotype 13, ER+ NV+ Her2 neg     7/2023 - metastatic ascending colon adenocarcinoma to liver    Caris - KRAS G12D, CAIN, PD-L1 0%    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, unresectable    7/31/2023 - FOLFOX    11/20/2023 - opdivo added to FOLFOX    12/18/2023-cycle 11-20% dose reduction of 5-FU bolus and oxaliplatin due to increased fatigue    Jan 2024 - oxaliplatin removed from treatment plan due to neuropathy - PET scan shows good disease control in all areas except colon lesion    3/2024 - right hemicolectomy - pT3N0    6/3/24 - cryoablation to liver (no interruption to systemic therapy)     Malignant neoplasm of right female breast (HCC)   11/23/2018 Initial Diagnosis    Malignant neoplasm of right female breast (HCC)     11/23/2018 Biopsy    Rt Breast US BX 1100 8cmfn, 4 passes 12g Marquee:  - Invasive breast carcinoma of no special type (ductal NST/invasive ductal carcinoma).  - grade 2  - %  - NV 95%  - HER2 negative     12/20/2018 Surgery    Right partial mastectomy and SLN biopsy:  Infiltrating ductal carcinoma, Grade 2/3, felt to be between 2.0 cm and 2.5 cm in greatest dimension  - No invasive tumor is seen at inked margins, but there is a focus of DCIS seen within approximately 1mm of the inked medial margin  - no LVI  - no LCIS  - 0/2 LN's  at least Stage IIA - pT2, pN0, cM0, G2.    - Dr Coronado     12/20/2018 -  Cancer Staged    Stage IIA - pT2, pN0, cM0, G2.       1/4/2019 Genomic Testing    Oncotype DX score: 13     2/1/2019 -  Hormone Therapy    anastrozole 1 mg daily as adjuvant endocrine therapy    - Dr Daley       2/14/2019 - 4/3/2019 Radiation    Course: C1    Plan ID Energy Fractions Dose per  "Fraction (cGy) Dose Correction (cGy) Total Dose Delivered (cGy) Elapsed Days   R Breast 10X/6X 25 / 25 200 0 5,000 40   R BRST BOOST 16E 5 / 5 200 0 1,000 7      Treatment dates:  C1: 2/14/2019 - 4/3/2019       Metastatic colon cancer to liver (HCC)   7/6/2023 Genetic Testing    CARIS Results:   KRAS Pathogenic Variant Exon 2  p.G12D : lack of benefit of cetuximab, panitumumab Level 2   No other actionable mutation; MSI stable; TMB low   MiFOLOXAi Results: \"Decreased Benefit of FOLFOX + Bevacizumab in first line metastatic CRC.  This patient may achieve improved results by receiving an alternative to FOLFOX, such as FOLFIRI, as their initial regimen. As an adjustment to frontline FOLFOXIRI following toxicity: This patient may achieve improved results by removing the oxaliplatin portion of their regimen\".         7/11/2023 Initial Diagnosis    Metastatic colon cancer to liver (HCC)     7/13/2023 -  Cancer Staged    Staging form: Colon and Rectum, AJCC 8th Edition  - Clinical stage from 7/13/2023: cT3, cN0, pM1 - Signed by Harika Medina DO on 9/28/2023  Total positive nodes: 0       7/31/2023 -  Chemotherapy    cyanocobalamin, 1,000 mcg, Intramuscular, Every 30 days, 7 of 9 cycles  Administration: 1,000 mcg (5/9/2024), 1,000 mcg (5/24/2024), 1,000 mcg (6/20/2024), 1,000 mcg (7/3/2024), 1,000 mcg (7/18/2024), 1,000 mcg (8/1/2024)  potassium chloride, 20 mEq, Intravenous, Once, 2 of 2 cycles  Administration: 20 mEq (11/6/2023), 20 mEq (11/20/2023)  alteplase (CATHFLO), 2 mg, Intracatheter, Every 1 Minute as needed, 21 of 23 cycles  Administration: 2 mg (1/3/2024)  pegfilgrastim (NEULASTA), 6 mg, Subcutaneous, Once, 12 of 12 cycles  Administration: 6 mg (10/25/2023), 6 mg (11/8/2023), 6 mg (11/22/2023), 6 mg (12/6/2023), 6 mg (12/20/2023), 6 mg (1/12/2024), 6 mg (1/25/2024), 6 mg (4/25/2024), 6 mg (5/9/2024), 6 mg (5/24/2024), 6 mg (6/20/2024)  fluorouracil (ADRUCIL), 895 mg, Intravenous, Once, 21 of 23 cycles  Dose " modification: 320 mg/m2 (original dose 400 mg/m2, Cycle 11)  Administration: 900 mg (7/31/2023), 900 mg (8/14/2023), 900 mg (8/28/2023), 900 mg (9/11/2023), 900 mg (9/25/2023), 900 mg (10/9/2023), 900 mg (10/23/2023), 895 mg (11/6/2023), 895 mg (11/20/2023), 895 mg (12/4/2023), 700 mg (12/18/2023), 680 mg (1/10/2024), 680 mg (1/23/2024), 625 mg (4/23/2024), 625 mg (5/7/2024), 625 mg (5/22/2024), 625 mg (6/4/2024), 625 mg (6/18/2024), 625 mg (7/1/2024), 625 mg (7/16/2024), 625 mg (7/30/2024)  nivolumab (OPDIVO) IVPB, 240 mg (100 % of original dose 240 mg), Intravenous, Once, 13 of 15 cycles  Dose modification: 240 mg (original dose 240 mg, Cycle 9)  Administration: 240 mg (11/20/2023), 240 mg (12/4/2023), 240 mg (12/18/2023), 240 mg (1/10/2024), 240 mg (1/23/2024), 240 mg (4/23/2024), 240 mg (5/7/2024), 240 mg (5/22/2024), 240 mg (6/4/2024), 240 mg (6/18/2024), 240 mg (7/1/2024), 240 mg (7/16/2024), 240 mg (7/30/2024)  leucovorin calcium IVPB, 896 mg, Intravenous, Once, 21 of 23 cycles  Administration: 900 mg (7/31/2023), 900 mg (8/14/2023), 900 mg (8/28/2023), 900 mg (9/11/2023), 900 mg (9/25/2023), 900 mg (10/9/2023), 900 mg (10/23/2023), 900 mg (11/6/2023), 900 mg (11/20/2023), 900 mg (12/4/2023), 900 mg (12/18/2023), 850 mg (1/10/2024), 850 mg (1/23/2024), 800 mg (4/23/2024), 800 mg (5/7/2024), 800 mg (5/22/2024), 800 mg (6/4/2024), 800 mg (6/18/2024), 800 mg (7/1/2024), 800 mg (7/16/2024), 800 mg (7/30/2024)  oxaliplatin (ELOXATIN) chemo infusion, 85 mg/m2 = 190.4 mg, Intravenous, Once, 12 of 12 cycles  Dose modification: 68 mg/m2 (original dose 85 mg/m2, Cycle 11, Reason: Other (Must fill in a comment), Comment: increased fatigue)  Administration: 190.4 mg (7/31/2023), 190.4 mg (8/14/2023), 200 mg (8/28/2023), 200 mg (9/11/2023), 200 mg (9/25/2023), 200 mg (10/9/2023), 200 mg (10/23/2023), 200 mg (11/6/2023), 200 mg (11/20/2023), 190.4 mg (12/4/2023), 150 mg (12/18/2023), 150 mg (1/10/2024)  fluorouracil  (ADRUCIL) ambulatory infusion Soln, 1,200 mg/m2/day = 5,375 mg, Intravenous, Over 46 hours, 21 of 23 cycles     9/26/2023 -  Cancer Staged    Staging form: Colon and Rectum, AJCC 8th Edition  - Pathologic: pT3, pN0, cM1 - Signed by Vickie Israel MD on 4/3/2024  Total positive nodes: 0       3/12/2024 Surgery    A. Terminal ileum, appendix, right colon (right hemicolectomy):  - Adenocarcinoma (5.2cm)  - Forty-nine (49) lymph nodes negative for carcinoma (0/49)    - Acellular mucin present in one (1) lymph node   - See staging synoptic (ypT3N0)     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HCC)   7/27/2023 Initial Diagnosis    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HCC)     7/27/2023 -  Cancer Staged    Staging form: Pharynx - Oropharynx, AJCC 8th Edition  - Clinical stage from 7/27/2023: Stage III (cT1, cN3, cM0, p16+) - Signed by Evan Plummer MD on 7/27/2023  Stage prefix: Initial diagnosis         Problem List:  Patient Active Problem List   Diagnosis    Primary hypertension    Mixed hyperlipidemia    Malignant neoplasm of right female breast (HCC)    Vitamin D deficiency    Prediabetes    Right thyroid nodule    GERD (gastroesophageal reflux disease)    Obesity    Metastatic colon cancer to liver (HCC)    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HCC)    Poor appetite    Nutritional anemia    Dehydration    Drug-induced constipation    Chemotherapy induced neutropenia (HCC)    Stage 3a chronic kidney disease (HCC)    Thrombocytopenia (HCC)    Neuropathy    Diastolic dysfunction    Hypokalemia    Leukemoid reaction    GOGO (acute kidney injury) (HCC)    Acute post-operative pain    At risk for constipation    At risk for delirium    Advance care planning    Physical deconditioning    History of CVA (cerebrovascular accident)    Chronic nasal congestion    Elevated LFTs    Vitamin B12 deficiency     Assessment and Plan:   Patient is a 69-year-old female, with a complex Oncologic history, including  synchronous de naveed metastatic adenocarcinoma of the mid-ascending colon (Complicated by partial-obstruction requiring right hemicolectomy on March 15th, 2024) with biopsy-proven oligometastatic disease to the liver (Status-post cryoablation on June 3rd, 2024), and unresectable (At least locally-advanced versus metastatic) 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 Of particular importance, repeat PET-CT, obtained on August 5th, 2024, demonstrated findings consistent with interval progression of disease involving the neck; evidenced by increased FDG-activity involving the bilateral upper cervical lymph nodes, increased hypermetabolic soft tissue involving the midline of prevertebral cervical soft tissues at the level of the nasopharynx, and increased asymmetric nodular-activity in the left tonsillar region. Additionally, there was questionable progression of disease involving the liver; however, this is difficult to distinguish from treatment-response in the setting of recent liver-directed therapy in June 2024. Ultimately, given clinical and radiographic evidence of disease progression involving the neck, as well as apparently stable metastatic colon cancer, will refer patient back to Radiation Oncology for consideration of concurrent chemoradiation. Discussed the above-mentioned in great detail with patient, including anticipated difficulty with tolerance, as well as likely need for nutritional-support via PEG-tube placement. Patient was agreeable to discuss further with Radiation Oncology, at this time. In the interim, will plan to present patient's case at the Multidisciplinary Head and Neck Tumor Board on Monday, August 12th, 2024. Plan for close interval follow-up in approximately 2-weeks. Will hold systemic-chemotherapy, in the interim. Patient expressed understanding and  agreement with the above plan.   1. Referral placed to Radiation Oncology for consideration of concurrent chemoRT.   2. Plan to discuss case at Multidisciplinary Head and Neck Tumor Board:    A. Anticipate case presentation on Monday, August 12th, 2024.   3. Recommend close interval follow-up within the next 2-weeks.  Note: Will hold systemic-chemotherapy at this time, pending the above-mentioned.    Past Medical History:   has a past medical history of Anemia, Arthritis, Body mass index (BMI) 40.0-44.9, adult (HCC), Breast cancer (HCC) (12/17/2018), Cancer (HCC), Cervical lymphadenopathy, Encounter for screening for HIV (07/07/2022), Follow-up examination (04/04/2023), GERD (gastroesophageal reflux disease), Hyperlipidemia, Hypertension, Obesity, Stroke (HCC), Stroke (HCC), Transaminitis (09/22/2021), and Vasomotor rhinitis.    Past Surgical History:   has a past surgical history that includes US guided breast biopsy right complete (Right, 11/23/2018); Breast surgery; pr mastectomy partial w/axillary lymphadenectomy (Right, 12/20/2018); Tubal ligation; Breast biopsy (Right, 11/23/2018); US guided injection for research study (12/20/2018); US guided injection for research study (12/07/2018); US guided injection for research study (05/03/2019); US guided lymph node biopsy right (05/26/2023); IR biopsy liver mass (06/27/2023); pr laryngoscopy w/biopsy microscope/telescope (N/A, 07/20/2023); pr [facility] incl fluor gdnce dx w/cell washg spx (N/A, 07/20/2023); ESOPHAGOSCOPY (N/A, 07/20/2023); IR port placement (07/28/2023); IR port check (01/03/2024); IR port stripping (01/09/2024); Colonoscopy; Hemicoloectomy w/ anastomosis (Right, 3/12/2024); LAPAROTOMY (N/A, 3/12/2024); and IR cryoablation (6/3/2024).    Current Medications:  Current Outpatient Medications   Medication Sig Dispense Refill    anastrozole (ARIMIDEX) 1 mg tablet Take 1 tablet (1 mg total) by mouth daily 90 tablet 3    atorvastatin (LIPITOR) 40 mg tablet  Take 1 tablet (40 mg total) by mouth daily at bedtime 30 tablet 2    docusate sodium (COLACE) 50 mg capsule Take 1 capsule (50 mg total) by mouth 2 (two) times a day 90 capsule 1    ergocalciferol (VITAMIN D2) 50,000 units Take 1 capsule (50,000 Units total) by mouth once a week 90 capsule 3    ferrous sulfate 325 (65 Fe) mg tablet Take 1 tablet (325 mg total) by mouth daily with breakfast (Patient not taking: Reported on 7/12/2024) 30 tablet 0    folic acid (FOLVITE) 1 mg tablet Take 1 tablet (1 mg total) by mouth in the morning 90 tablet 3    gabapentin (NEURONTIN) 300 mg capsule Take 1 pills in the morning, 1 pill at lunch and 2 pills before bed 120 capsule 3    hydrocortisone 1 % ointment       lidocaine-prilocaine (EMLA) cream Apply topically as needed for mild pain 30 g 3    losartan (COZAAR) 50 mg tablet Take 1 tablet (50 mg total) by mouth daily      mirtazapine (REMERON) 15 mg tablet Take 1 tablet (15 mg total) by mouth daily at bedtime Patient is also on a 30 mg tablet, for a total dose of 45 mg 30 tablet 3    mirtazapine (REMERON) 30 mg tablet Take 1 tablet (30 mg total) by mouth daily at bedtime 30 tablet 3    omeprazole (PriLOSEC) 20 mg delayed release capsule Take 1 capsule (20 mg total) by mouth daily 30 capsule 5    ondansetron (ZOFRAN) 8 mg tablet Take 1 tablet (8 mg total) by mouth every 8 (eight) hours as needed for nausea or vomiting 30 tablet 3    potassium chloride (Klor-Con M20) 20 mEq tablet Take 1 tablet (20 mEq total) by mouth 2 (two) times a day 60 tablet 1    prochlorperazine (COMPAZINE) 10 mg tablet Take 1 tablet (10 mg total) by mouth every 6 (six) hours as needed for nausea or vomiting 30 tablet 3    vitamin B-12 (VITAMIN B-12) 1,000 mcg tablet        No current facility-administered medications for this visit.     Social History:   reports that she has never smoked. She has never been exposed to tobacco smoke. She has never used smokeless tobacco. She reports that she does not drink  alcohol and does not use drugs.     Family History:  family history includes Colon cancer (age of onset: 58) in her mother; Hypertension in her daughter, mother, and son; Pancreatic cancer (age of onset: 60) in her father.     Allergies:  has No Known Allergies.    Objective:  Vitals:    08/06/24 1014   BP: 118/70   Pulse: 75   Resp: 16   Temp: 98 °F (36.7 °C)   SpO2: 96%     Physical Exam:  General: Alert, and oriented; Pleasant, and conversational; No apparent distress  HEENT: Atraumatic, and normocephalic; PERRLA; EOMI; Moist mucosal membranes  Neck: Trachea midline; Bilateral Neck Fullness   Cardiovascular: Regular rate and rhythm; No murmurs, rubs, or gallops; No edema  Respiratory: Clear to auscultation bilaterally; Adequate aeration; No supplemental oxygen   Abdomen: Soft, Non-tender; Non-distended; No organomegaly; Bowel sounds present  Extremities: No obvious deformities; No peripheral edema; Moves all  Neurologic: Appropriately alert, and oriented to Person, Place, and Time; No focal neurologic deficits    Labs:  Lab Results   Component Value Date    WBC 5.22 07/29/2024    HGB 9.2 (L) 07/29/2024    HCT 28.4 (L) 07/29/2024    MCV 89 07/29/2024     07/29/2024     Lab Results   Component Value Date    SODIUM 137 07/29/2024    K 3.4 (L) 07/29/2024     07/29/2024    CO2 26 07/29/2024    AGAP 4 07/29/2024    BUN 14 07/29/2024    CREATININE 0.75 07/29/2024    GLUC 107 07/29/2024    GLUF 101 (H) 06/03/2024    CALCIUM 9.1 07/29/2024    AST 45 (H) 07/29/2024    ALT 31 07/29/2024    ALKPHOS 142 (H) 07/29/2024    TP 7.2 07/29/2024    TBILI 0.29 07/29/2024    EGFR 82 07/29/2024     Patient was seen and discussed with attending physician, Harika Medina D.O.    Carole Newman D.O.  Hematology-Oncology Fellow (PGY-5)

## 2024-08-06 NOTE — PROGRESS NOTES
In-basket message received from Dr. Medina to add patient to the [facility] on 8/12/24. Chart reviewed and prep completed.

## 2024-08-06 NOTE — PROGRESS NOTES
Pt called to confirm transportation is set up for today, I confirmed transportation is scheduled via [employer] for today, she was thankful for the assistance

## 2024-08-07 ENCOUNTER — TELEPHONE (OUTPATIENT)
Age: 69
End: 2024-08-07

## 2024-08-07 NOTE — TELEPHONE ENCOUNTER
Mahesh called with significant findings in PET CT from 8/5. Please review result in chart. Thank you.

## 2024-08-11 ENCOUNTER — DOCUMENTATION (OUTPATIENT)
Dept: HEMATOLOGY ONCOLOGY | Facility: CLINIC | Age: 69
End: 2024-08-11

## 2024-08-11 NOTE — PROGRESS NOTES
Chart reviewed in preparation of Multidisciplinary Head and Neck Tumor Conference presentation by Dr. Medina on 8/12/24.  Patient with a complex Oncologic history, including early-stage hormone-positive HER-2 negative right breast cancer (Status-post right lumpectomy with sentinel lymph node biopsy on 12/20/18 and whole-breast radiation completed in 4/19; On adjuvant endocrine-therapy with Anastrozole since 2/19), as well as synchronous de naveed metastatic adenocarcinoma of the mid-ascending colon (Complicated by partial-obstruction requiring right hemicolectomy on 3/15/24) with biopsy-proven oligometastatic disease to the liver (Status-post cryoablation on 6/3/24), and unresectable (At least locally-advanced versus metastatic) 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  8/5/24 PET/CT demonstrated findings consistent with interval progression of disease involving the neck.  Additionally, there was questionable progression of disease involving the liver.

## 2024-08-12 ENCOUNTER — DOCUMENTATION (OUTPATIENT)
Dept: HEMATOLOGY ONCOLOGY | Facility: CLINIC | Age: 69
End: 2024-08-12

## 2024-08-12 ENCOUNTER — TELEPHONE (OUTPATIENT)
Dept: HEMATOLOGY ONCOLOGY | Facility: CLINIC | Age: 69
End: 2024-08-12

## 2024-08-12 DIAGNOSIS — C09.9 RIGHT TONSILLAR SQUAMOUS CELL CARCINOMA (HCC): Primary | ICD-10-CM

## 2024-08-12 DIAGNOSIS — I10 PRIMARY HYPERTENSION: ICD-10-CM

## 2024-08-12 RX ORDER — LOSARTAN POTASSIUM 50 MG/1
50 TABLET ORAL DAILY
Qty: 90 TABLET | Refills: 5 | Status: SHIPPED | OUTPATIENT
Start: 2024-08-12

## 2024-08-12 NOTE — TELEPHONE ENCOUNTER
I spoke to the patient to give her an update from our tumor board discussion.  The ear nose and throat team would like to reevaluate her as they have not examined her in the past year.  She will keep her appointment with Dr. Tolbert.  We have an appointment Friday and I may move that into the future a bit after we can get both of these evaluations.  I will cancel her labs on Monday as her treatment is on hold right now.  I will have my office reach out to take care of scheduling.

## 2024-08-12 NOTE — TELEPHONE ENCOUNTER
Spoke with patient to let her know that I spoke with ENT and they are unable to make her an appointment because she has an overdue balance of approx $140 on her account. Patient gave me permission to reach out to our social work team to assist with this. Patient knows I will be calling with an appointment as soon as this is resolved.

## 2024-08-12 NOTE — PROGRESS NOTES
HEAD AND NECK MULTIDISCIPLINARY CASE REVIEW    DATE:    8/12/2024    PRESENTING DOCTOR: Dr. Carole Newman     DIAGNOSIS: Squamous Cell Carcinoma Right Tonsil  STAGING: Stage III    Maira Moura is a 69 y.o. female who was presented at the Head and Neck Oncology Multidisciplinary Tumor Conference today. Patient with a complex Oncologic history, including early-stage hormone-positive HER-2 negative right breast cancer (Status-post right lumpectomy with sentinel lymph node biopsy on 12/20/18 and whole-breast radiation completed in 4/19; On adjuvant endocrine-therapy with Anastrozole since 2/19), as well as synchronous de naveed metastatic adenocarcinoma of the mid-ascending colon (Complicated by partial-obstruction requiring right hemicolectomy on 3/15/24) with biopsy-proven oligometastatic disease to the liver (Status-post cryoablation on 6/3/24), and unresectable (At least locally-advanced versus metastatic) 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  8/5/24 PET/CT demonstrated findings consistent with interval progression of disease involving the neck.  Additionally, there was questionable progression of disease involving the liver.      PHYSICIAN RECOMMENDED PLAN:  - Follow up appointment with Dr. Chapa to scope, possible D&L for biopsy.   - Keep appointment with Radiation Oncology - Dr. Tolbert      Imaging reviewed:   08/05/24 PET CT  06/06/24 CT abdomen pelvis w contrast  04/18/24 PET CT   07/25/17 MRI brain w wo contrast      Pathology reviewed: No      Referrals: No      Procedures: TBD          Team agreed to plan.  NCCN guidelines were readily available for review at this discussion    The final treatment plan will be left to the discretion of the patient and the treating physician.     DISCLAIMERS:  TO THE TREATING PHYSICIAN:  This conference is a meeting of clinicians from various specialty areas who evaluate and  discuss patients for whom a multidisciplinary treatment approach is being considered. Please note that the above opinion was a consensus of the conference attendees and is intended only to assist in quality care of the discussed patient.  The responsibility for follow up on the input given during the conference, along with any final decisions regarding plan of care, is that of the patient and the patient's provider. Accordingly, appointments have only been recommended based on this information and have NOT been scheduled unless otherwise noted.      TO THE PATIENT:  This summary is a brief record of major aspects of your cancer treatment. You may choose to share a copy with any of your doctors or nurses. However, this is not a detailed or comprehensive record of your care.

## 2024-08-13 ENCOUNTER — PATIENT OUTREACH (OUTPATIENT)
Dept: CASE MANAGEMENT | Facility: HOSPITAL | Age: 69
End: 2024-08-13

## 2024-08-13 ENCOUNTER — TELEPHONE (OUTPATIENT)
Dept: HEMATOLOGY ONCOLOGY | Facility: CLINIC | Age: 69
End: 2024-08-13

## 2024-08-13 NOTE — TELEPHONE ENCOUNTER
Left detailed VM for patient to let her know that our  was able to cover the bill for ENT. I also let her know that I was able to schedule her this Friday with Dr. Chapa at 0845. I informed her that I did set up transportation and asked that she call back to confirm she received my message.

## 2024-08-13 NOTE — PROGRESS NOTES
TAMARAW received request form [employer]oo to reach out to the ENT office on behalf of the pt.  Pt needs an appointment ASAP and the office was not able to schedule as the pt had an outstanding balance.    LSW called the ENT billing office several times and was placed on hold and disconnected.  The last call the representative had LSW on hold for quite some time, returned and apologized for the long hold and explained there was a misunderstanding with the pt's account.  They had billed the pt incorrectly. They removed all charges and holds form her account and pt had no balance.    TAMARAW reached out to the oncology office to notify [employer], who was going to schedule the appointment.

## 2024-08-20 ENCOUNTER — CONSULT (OUTPATIENT)
Dept: RADIATION ONCOLOGY | Facility: CLINIC | Age: 69
End: 2024-08-20
Payer: MEDICARE

## 2024-08-20 VITALS
WEIGHT: 203.2 LBS | HEIGHT: 65 IN | TEMPERATURE: 96.8 F | SYSTOLIC BLOOD PRESSURE: 114 MMHG | DIASTOLIC BLOOD PRESSURE: 75 MMHG | OXYGEN SATURATION: 99 % | BODY MASS INDEX: 33.85 KG/M2

## 2024-08-20 DIAGNOSIS — C18.9 METASTATIC COLON CANCER TO LIVER (HCC): ICD-10-CM

## 2024-08-20 DIAGNOSIS — C50.411 MALIGNANT NEOPLASM OF UPPER-OUTER QUADRANT OF RIGHT BREAST IN FEMALE, ESTROGEN RECEPTOR POSITIVE (HCC): ICD-10-CM

## 2024-08-20 DIAGNOSIS — Z17.0 MALIGNANT NEOPLASM OF UPPER-OUTER QUADRANT OF RIGHT BREAST IN FEMALE, ESTROGEN RECEPTOR POSITIVE (HCC): ICD-10-CM

## 2024-08-20 DIAGNOSIS — C09.9 RIGHT TONSILLAR SQUAMOUS CELL CARCINOMA (HCC): Primary | ICD-10-CM

## 2024-08-20 DIAGNOSIS — C78.7 METASTATIC COLON CANCER TO LIVER (HCC): ICD-10-CM

## 2024-08-20 PROCEDURE — 99215 OFFICE O/P EST HI 40 MIN: CPT | Performed by: RADIOLOGY

## 2024-08-20 PROCEDURE — 99211 OFF/OP EST MAY X REQ PHY/QHP: CPT | Performed by: RADIOLOGY

## 2024-08-20 NOTE — PROGRESS NOTES
Maira Moura 1955 is a 69 y.o. femalewith a complex oncologic history  including:  Early-stage hormone-positive HER-2 negative right breast cancer s/p lumpectomy and and whole-breast radiation completed in 4/19 with Dr. Drake. On adjuvant endocrine-therapy with Anastrozole since 2/19.  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     Recent PET/CT showed findings consistent with interval progression of disease involving the neck.  Additionally, there was questionable progression of disease involving the liver.   Having been discussed at recent tumor board, presents today for discussion of RT to tonsil.  Referred by Dr. Medina.     8/5/24  PET CT  IMPRESSION:   1. Findings suspicious for interval progression of hypermetabolic malignancy/metastatic disease involving the neck with increased FDG activity associated with essentially stable in size bilateral upper cervical lymphadenopathy.   -In addition, note is made of increasing hypermetabolic soft tissue involving the midline of the prevertebral cervical soft tissues at the level of the nasopharynx; findings could reflect inflammatory activity, correlation with direct visualization and   possibly tissue sampling is recommended to exclude developing hypermetabolic malignancy in this location.   -Additional note is made of increasing asymmetric nodular activity in the left tonsillar region; while findings could reflect inflammatory activity, given history of right tonsillar carcinoma, correlation is recommended to exclude developing left tonsillar carcinoma.   2. Interval progression of hypermetabolic hepatic metastatic disease, presumably related to  history of colon cancer. Findings can be further characterized with contrast-enhanced MRI/CT as clinically indicated.    8/6/24  Dr. Medina  Having intermittent dysphagia.  Recent PET shows progression of disease in neck and possible liver progression; difficult to distinguish from treatment response given recent liver treatment.  Will refer back to radiation oncology for discussion of chemoradiation.  Likely will need PEG tube placement    8/12/24  Head and Neck Multidisciplinary Case Review  PHYSICIAN RECOMMENDED PLAN:  - Follow up appointment with Dr. Chapa to scope, possible D&L for biopsy.   - Keep appointment with Radiation Oncology - Dr. Tolbert    8/16/24  Dr. Chapa  Will discuss  if patient is surgical candidate vs radiation.       Upcoming:  10/11/24 Cardiology    Oncology History   Malignant neoplasm of right female breast (HCC)   11/23/2018 Initial Diagnosis    Malignant neoplasm of right female breast (HCC)     11/23/2018 Biopsy    Rt Breast US BX 1100 8cmfn, 4 passes 12g Marquee:  - Invasive breast carcinoma of no special type (ductal NST/invasive ductal carcinoma).  - grade 2  - %  - NM 95%  - HER2 negative     12/20/2018 Surgery    Right partial mastectomy and SLN biopsy:  Infiltrating ductal carcinoma, Grade 2/3, felt to be between 2.0 cm and 2.5 cm in greatest dimension  - No invasive tumor is seen at inked margins, but there is a focus of DCIS seen within approximately 1mm of the inked medial margin  - no LVI  - no LCIS  - 0/2 LN's  at least Stage IIA - pT2, pN0, cM0, G2.    - Dr Coronado     12/20/2018 -  Cancer Staged    Stage IIA - pT2, pN0, cM0, G2.       1/4/2019 Genomic Testing    Oncotype DX score: 13     2/1/2019 -  Hormone Therapy    anastrozole 1 mg daily as adjuvant endocrine therapy    - Dr Daley       2/14/2019 - 4/3/2019 Radiation    Course: C1    Plan ID Energy Fractions Dose per Fraction (cGy) Dose Correction (cGy) Total Dose Delivered (cGy) Elapsed Days   R  "Breast 10X/6X 25 / 25 200 0 5,000 40   R BRST BOOST 16E 5 / 5 200 0 1,000 7      Treatment dates:  C1: 2/14/2019 - 4/3/2019       Metastatic colon cancer to liver (HCC)   7/6/2023 Genetic Testing    CARIS Results:   KRAS Pathogenic Variant Exon 2  p.G12D : lack of benefit of cetuximab, panitumumab Level 2   No other actionable mutation; MSI stable; TMB low   MiFOLOXAi Results: \"Decreased Benefit of FOLFOX + Bevacizumab in first line metastatic CRC.  This patient may achieve improved results by receiving an alternative to FOLFOX, such as FOLFIRI, as their initial regimen. As an adjustment to frontline FOLFOXIRI following toxicity: This patient may achieve improved results by removing the oxaliplatin portion of their regimen\".         7/11/2023 Initial Diagnosis    Metastatic colon cancer to liver (HCC)     7/13/2023 -  Cancer Staged    Staging form: Colon and Rectum, AJCC 8th Edition  - Clinical stage from 7/13/2023: cT3, cN0, pM1 - Signed by Harika Medina DO on 9/28/2023  Total positive nodes: 0       7/31/2023 -  Chemotherapy    cyanocobalamin, 1,000 mcg, Intramuscular, Every 30 days, 7 of 9 cycles  Administration: 1,000 mcg (5/9/2024), 1,000 mcg (5/24/2024), 1,000 mcg (6/20/2024), 1,000 mcg (7/3/2024), 1,000 mcg (7/18/2024), 1,000 mcg (8/1/2024)  potassium chloride, 20 mEq, Intravenous, Once, 2 of 2 cycles  Administration: 20 mEq (11/6/2023), 20 mEq (11/20/2023)  alteplase (CATHFLO), 2 mg, Intracatheter, Every 1 Minute as needed, 21 of 23 cycles  Administration: 2 mg (1/3/2024)  pegfilgrastim (NEULASTA), 6 mg, Subcutaneous, Once, 12 of 12 cycles  Administration: 6 mg (10/25/2023), 6 mg (11/8/2023), 6 mg (11/22/2023), 6 mg (12/6/2023), 6 mg (12/20/2023), 6 mg (1/12/2024), 6 mg (1/25/2024), 6 mg (4/25/2024), 6 mg (5/9/2024), 6 mg (5/24/2024), 6 mg (6/20/2024)  fluorouracil (ADRUCIL), 895 mg, Intravenous, Once, 21 of 23 cycles  Dose modification: 320 mg/m2 (original dose 400 mg/m2, Cycle 11)  Administration: 900 " mg (7/31/2023), 900 mg (8/14/2023), 900 mg (8/28/2023), 900 mg (9/11/2023), 900 mg (9/25/2023), 900 mg (10/9/2023), 900 mg (10/23/2023), 895 mg (11/6/2023), 895 mg (11/20/2023), 895 mg (12/4/2023), 700 mg (12/18/2023), 680 mg (1/10/2024), 680 mg (1/23/2024), 625 mg (4/23/2024), 625 mg (5/7/2024), 625 mg (5/22/2024), 625 mg (6/4/2024), 625 mg (6/18/2024), 625 mg (7/1/2024), 625 mg (7/16/2024), 625 mg (7/30/2024)  nivolumab (OPDIVO) IVPB, 240 mg (100 % of original dose 240 mg), Intravenous, Once, 13 of 15 cycles  Dose modification: 240 mg (original dose 240 mg, Cycle 9)  Administration: 240 mg (11/20/2023), 240 mg (12/4/2023), 240 mg (12/18/2023), 240 mg (1/10/2024), 240 mg (1/23/2024), 240 mg (4/23/2024), 240 mg (5/7/2024), 240 mg (5/22/2024), 240 mg (6/4/2024), 240 mg (6/18/2024), 240 mg (7/1/2024), 240 mg (7/16/2024), 240 mg (7/30/2024)  leucovorin calcium IVPB, 896 mg, Intravenous, Once, 21 of 23 cycles  Administration: 900 mg (7/31/2023), 900 mg (8/14/2023), 900 mg (8/28/2023), 900 mg (9/11/2023), 900 mg (9/25/2023), 900 mg (10/9/2023), 900 mg (10/23/2023), 900 mg (11/6/2023), 900 mg (11/20/2023), 900 mg (12/4/2023), 900 mg (12/18/2023), 850 mg (1/10/2024), 850 mg (1/23/2024), 800 mg (4/23/2024), 800 mg (5/7/2024), 800 mg (5/22/2024), 800 mg (6/4/2024), 800 mg (6/18/2024), 800 mg (7/1/2024), 800 mg (7/16/2024), 800 mg (7/30/2024)  oxaliplatin (ELOXATIN) chemo infusion, 85 mg/m2 = 190.4 mg, Intravenous, Once, 12 of 12 cycles  Dose modification: 68 mg/m2 (original dose 85 mg/m2, Cycle 11, Reason: Other (Must fill in a comment), Comment: increased fatigue)  Administration: 190.4 mg (7/31/2023), 190.4 mg (8/14/2023), 200 mg (8/28/2023), 200 mg (9/11/2023), 200 mg (9/25/2023), 200 mg (10/9/2023), 200 mg (10/23/2023), 200 mg (11/6/2023), 200 mg (11/20/2023), 190.4 mg (12/4/2023), 150 mg (12/18/2023), 150 mg (1/10/2024)  fluorouracil (ADRUCIL) ambulatory infusion Soln, 1,200 mg/m2/day = 5,375 mg, Intravenous, Over 46  hours, 21 of 23 cycles     9/26/2023 -  Cancer Staged    Staging form: Colon and Rectum, AJCC 8th Edition  - Pathologic: pT3, pN0, cM1 - Signed by Vickie Israel MD on 4/3/2024  Total positive nodes: 0       3/12/2024 Surgery    A. Terminal ileum, appendix, right colon (right hemicolectomy):  - Adenocarcinoma (5.2cm)  - Forty-nine (49) lymph nodes negative for carcinoma (0/49)    - Acellular mucin present in one (1) lymph node   - See staging synoptic (ypT3N0)     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HCC)   7/27/2023 Initial Diagnosis    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HCC)     7/27/2023 -  Cancer Staged    Staging form: Pharynx - Oropharynx, AJCC 8th Edition  - Clinical stage from 7/27/2023: Stage III (cT1, cN3, cM0, p16+) - Signed by Evan Plummer MD on 7/27/2023  Stage prefix: Initial diagnosis           Review of Systems:  Review of Systems   Constitutional:  Negative for activity change, appetite change, fatigue and unexpected weight change.   HENT:  Positive for trouble swallowing and voice change. Negative for sore throat.    Eyes: Negative.    Respiratory:  Negative for cough, chest tightness and shortness of breath.    Cardiovascular:  Negative for chest pain.   Gastrointestinal:  Negative for constipation, diarrhea, rectal pain and vomiting.   Endocrine: Positive for cold intolerance. Negative for heat intolerance.   Genitourinary: Negative.    Musculoskeletal: Negative.    Skin: Negative.    Allergic/Immunologic: Negative for environmental allergies and food allergies.   Neurological:  Positive for light-headedness and numbness. Negative for dizziness, weakness and headaches.        NEUROPATHY IN FINGERS/TOES AND LEGS.     Hematological:  Does not bruise/bleed easily.   Psychiatric/Behavioral: Negative.         Clinical Trial: no    OB/GYN History:  The patient underwent menarche at 1 years  Menopause Status   No LMP recorded. Patient is postmenopausal.  Menopause at 51}  years.  Menopause Reason   Hormone replacement therapy: no.  Years used NO   0   Para 0   Age at first delivery being 22 years.   Nursing: no.   Birth control pills: yes.  Years used 1YR    Pregnancy test needed:  no    ONCOTYPE/MAMMOPRINT results     Prior Radiation YES-BREAST CANCER-5YRS AGO    Teaching YES    MST     Implantable Devices (Port, Pacemaker, pain stimulator) NO    Hip Replacement NO      [unfilled]  Health Maintenance   Topic Date Due    Medicare Annual Wellness Visit (AWV)  Never done    COVID-19 Vaccine (1) Never done    BMI: Followup Plan  Never done    RSV Vaccine Age 60+ Years (1 - 1-dose 60+ series) Never done    Zoster Vaccine (1 of 2) 2018    PT PLAN OF CARE  2021    Pneumococcal Vaccine: 65+ Years (3 of 3 - PCV) 2022    Breast Cancer Screening: Mammogram  2023    Influenza Vaccine (1) 2024    Fall Risk  10/09/2024    Urinary Incontinence Screening  10/09/2024    BMI: Adult  2025    Depression Screening  2025    Hepatitis C Screening  Completed    Osteoporosis Screening  Completed    RSV Vaccine age 0-20 Months  Aged Out    HIB Vaccine  Aged Out    IPV Vaccine  Aged Out    Hepatitis A Vaccine  Aged Out    Meningococcal ACWY Vaccine  Aged Out    HPV Vaccine  Aged Out    Colorectal Cancer Screening  Discontinued     Past Medical History:   Diagnosis Date    Anemia     Arthritis     Body mass index (BMI) 40.0-44.9, adult (HCC)     Breast cancer (HCC) 2018    Cancer (HCC)     right breast, colon, liver, right tonsil    Cervical lymphadenopathy     CT neck on 3/30/2023- Large right level 2A lymphadenopathy as described above and suspicious for neoplasm.  Correlation with histopathology is recommended.  Mild asymmetric prominence of the right palatine tonsil with otherwise no definitive nodular enhancing lesions identified along the course of the aerodigestive tract.     23- FNA of this node was nonrevealing for tissue etiology,  but it d    Encounter for screening for HIV 07/07/2022    Follow-up examination 04/04/2023    GERD (gastroesophageal reflux disease)     Hyperlipidemia     Hypertension     Obesity     Stroke (HCC)     TIA     Stroke (HCC)     TIA     Transaminitis 09/22/2021    Vasomotor rhinitis     Refilled flonase today. Stopped taking since January 2022     Past Surgical History:   Procedure Laterality Date    BREAST BIOPSY Right 11/23/2018    us guided bx cancer    BREAST SURGERY      COLONOSCOPY      ESOPHAGOSCOPY N/A 07/20/2023    Procedure: ESOPHAGOSCOPY;  Surgeon: David Chapa MD;  [facility];  Service: ENT    HEMICOLOECTOMY W/ ANASTOMOSIS Right 3/12/2024    Procedure: RIGHT COLECTOMY WITH ROBOTICS;  Surgeon: Vickie Israel MD;  Location: [facility];  Service: Surgical Oncology    IR BIOPSY LIVER MASS  06/27/2023    IR CRYOABLATION  6/3/2024    IR PORT CHECK  01/03/2024    IR PORT PLACEMENT  07/28/2023    IR PORT STRIPPING  01/09/2024    LAPAROTOMY N/A 3/12/2024    Procedure: LAPAROTOMY EXPLORATORY W/ ROBOTICS;  Surgeon: Vickie Israel MD;  Location: [facility];  Service: Surgical Oncology    OR [facility] INCL FLUOR GDNCE DX W/CELL WASHG SPX N/A 07/20/2023    Procedure: BRONCHOSCOPY;  Surgeon: David Chapa MD;  [facility];  Service: ENT    OR LARYNGOSCOPY W/BIOPSY MICROSCOPE/TELESCOPE N/A 07/20/2023    Procedure: MICRODIRECT LARYNGOSCOPY WITH BIOPSY;  Surgeon: David Chapa MD;  [facility];  Service: ENT    OR MASTECTOMY PARTIAL W/AXILLARY LYMPHADENECTOMY Right 12/20/2018    Procedure: ULTRASOUND LOCALIZED PARTIAL MASTECTOMY W/SENTINEL NODE BIOPSY POSS AXILLARY DISSECTION;  Surgeon: Zahida Coronado MD;  Location: [facility];  Service: General    TUBAL LIGATION      US GUIDED BREAST BIOPSY RIGHT COMPLETE Right 11/23/2018    US GUIDED INJECTION FOR RESEARCH STUDY  12/20/2018    US GUIDED INJECTION FOR RESEARCH STUDY  12/07/2018    US GUIDED INJECTION FOR RESEARCH STUDY   05/03/2019    US GUIDED LYMPH NODE BIOPSY RIGHT  05/26/2023     Family History   Problem Relation Age of Onset    Colon cancer Mother 58    Hypertension Mother     Pancreatic cancer Father 60    Hypertension Daughter     Hypertension Son      Social History     Tobacco Use    Smoking status: Never     Passive exposure: Never    Smokeless tobacco: Never    Tobacco comments:     NO TOBACCO USE   Vaping Use    Vaping status: Never Used   Substance Use Topics    Alcohol use: Never    Drug use: Never        Current Outpatient Medications:     anastrozole (ARIMIDEX) 1 mg tablet, Take 1 tablet (1 mg total) by mouth daily, Disp: 90 tablet, Rfl: 3    atorvastatin (LIPITOR) 40 mg tablet, Take 1 tablet (40 mg total) by mouth daily at bedtime, Disp: 30 tablet, Rfl: 2    docusate sodium (COLACE) 50 mg capsule, Take 1 capsule (50 mg total) by mouth 2 (two) times a day, Disp: 90 capsule, Rfl: 1    ergocalciferol (VITAMIN D2) 50,000 units, Take 1 capsule (50,000 Units total) by mouth once a week, Disp: 90 capsule, Rfl: 3    folic acid (FOLVITE) 1 mg tablet, Take 1 tablet (1 mg total) by mouth in the morning, Disp: 90 tablet, Rfl: 3    gabapentin (NEURONTIN) 300 mg capsule, Take 1 pills in the morning, 1 pill at lunch and 2 pills before bed, Disp: 120 capsule, Rfl: 3    hydrocortisone 1 % ointment, , Disp: , Rfl:     lidocaine-prilocaine (EMLA) cream, Apply topically as needed for mild pain, Disp: 30 g, Rfl: 3    losartan (COZAAR) 50 mg tablet, Take 1 tablet (50 mg total) by mouth daily, Disp: 90 tablet, Rfl: 5    mirtazapine (REMERON) 15 mg tablet, Take 1 tablet (15 mg total) by mouth daily at bedtime Patient is also on a 30 mg tablet, for a total dose of 45 mg, Disp: 30 tablet, Rfl: 3    mirtazapine (REMERON) 30 mg tablet, Take 1 tablet (30 mg total) by mouth daily at bedtime, Disp: 30 tablet, Rfl: 3    omeprazole (PriLOSEC) 20 mg delayed release capsule, Take 1 capsule (20 mg total) by mouth daily, Disp: 30 capsule, Rfl: 5     "ondansetron (ZOFRAN) 8 mg tablet, Take 1 tablet (8 mg total) by mouth every 8 (eight) hours as needed for nausea or vomiting, Disp: 30 tablet, Rfl: 3    potassium chloride (Klor-Con M20) 20 mEq tablet, Take 1 tablet (20 mEq total) by mouth 2 (two) times a day, Disp: 60 tablet, Rfl: 1    prochlorperazine (COMPAZINE) 10 mg tablet, Take 1 tablet (10 mg total) by mouth every 6 (six) hours as needed for nausea or vomiting, Disp: 30 tablet, Rfl: 3    vitamin B-12 (VITAMIN B-12) 1,000 mcg tablet, , Disp: , Rfl:     ferrous sulfate 325 (65 Fe) mg tablet, Take 1 tablet (325 mg total) by mouth daily with breakfast (Patient not taking: Reported on 7/12/2024), Disp: 30 tablet, Rfl: 0  No Known Allergies   Vitals:    08/20/24 1147   BP: 114/75   BP Location: Left arm   Patient Position: Sitting   Cuff Size: Large   Temp: (!) 96.8 °F (36 °C)   TempSrc: Temporal   SpO2: 99%   Weight: 92.2 kg (203 lb 3.2 oz)   Height: 5' 5\" (1.651 m)        "

## 2024-08-20 NOTE — PROGRESS NOTES
Consultation - Radiation Oncology      MRN:[id number] : 1955  Encounter: [id number]  Patient Information: Maira Moura      CHIEF COMPLAINT  Chief Complaint   Patient presents with    Consult     Cancer Staging   Malignant neoplasm of right female breast (HCC)  Staging form: Breast, AJCC 8th Edition  - Pathologic: Stage IA (pT2, pN0, cM0, G2, ER: Positive, W[place]: Positive, HER2: Negative) - Signed by Rosalva Drake MD on 2019  Histologic grading system: 3 grade system    Metastatic colon cancer to liver (HCC)  Staging form: Colon and Rectum, AJCC 8th Edition  - Clinical stage from 2023: cT3, cN0, pM1 - Signed by Harika Medina DO on 2023  Total positive nodes: 0  - Pathologic: pT3, pN0, cM1 - Signed by Vickie Israel MD on 4/3/2024  Total positive nodes: 0    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HCC)  Staging form: Pharynx - Oropharynx, AJCC 8th Edition  - Clinical stage from 2023: Stage III (cT1, cN3, cM0, p16+) - Signed by Evan Plummer MD on 2023  Stage prefix: Initial diagnosis           History of Present Illness   Maira Moura is a 69 y.o. year old female who presents with a complex oncologic history  including:  Early-stage hormone-positive HER-2 negative right breast cancer s/p lumpectomy and and whole-breast radiation completed in  with Dr. Drake. On adjuvant endocrine-therapy with Anastrozole since .  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     Recent PET/CT showed findings consistent with interval progression of disease involving the neck.  Additionally, there was questionable progression of disease involving the liver.   Having  been discussed at recent tumor board, presents today for discussion of RT to tonsil.  Referred by Dr. Medina.      8/5/24  PET CT  IMPRESSION:   1. Findings suspicious for interval progression of hypermetabolic malignancy/metastatic disease involving the neck with increased FDG activity associated with essentially stable in size bilateral upper cervical lymphadenopathy.   -In addition, note is made of increasing hypermetabolic soft tissue involving the midline of the prevertebral cervical soft tissues at the level of the nasopharynx; findings could reflect inflammatory activity, correlation with direct visualization and   possibly tissue sampling is recommended to exclude developing hypermetabolic malignancy in this location.   -Additional note is made of increasing asymmetric nodular activity in the left tonsillar region; while findings could reflect inflammatory activity, given history of right tonsillar carcinoma, correlation is recommended to exclude developing left tonsillar carcinoma.   2. Interval progression of hypermetabolic hepatic metastatic disease, presumably related to history of colon cancer. Findings can be further characterized with contrast-enhanced MRI/CT as clinically indicated.     8/6/24  Dr. Medina  Having intermittent dysphagia.  Recent PET shows progression of disease in neck and possible liver progression; difficult to distinguish from treatment response given recent liver treatment.  Will refer back to radiation oncology for discussion of chemoradiation.  Likely will need PEG tube placement     8/12/24  Head and Neck Multidisciplinary Case Review  PHYSICIAN RECOMMENDED PLAN:  - Follow up appointment with Dr. Chapa to scope, possible D&L for biopsy.   - Keep appointment with Radiation Oncology - Dr. Tolbert     8/16/24  Dr. Chapa  Will discuss with  if patient is surgical candidate vs radiation.      Patient seen today alone.  She reports her only previous radiation therapy has  been the right breast.  She is having no difficulty with dysphagia.  She has never smoked any tobacco nor drank any alcohol.  She is single and lives with her son and daughter.  She is retired from working in a factory in food processing.  Her mother  of colon cancer at age of 63.  Her father  of pancreatic cancer at the age of 65.  She does not drive.  She reports neuropathy of her hands and feet.    Upcoming:  10/11/24 Cardiology    Historical Information   Oncology History   Malignant neoplasm of right female breast (HCC)   2018 Initial Diagnosis    Malignant neoplasm of right female breast (HCC)     2018 Biopsy    Rt Breast US BX 1100 8cmfn, 4 passes 12g Marquee:  - Invasive breast carcinoma of no special type (ductal NST/invasive ductal carcinoma).  - grade 2  - %  - NV 95%  - HER2 negative     2018 Surgery    Right partial mastectomy and SLN biopsy:  Infiltrating ductal carcinoma, Grade 2/3, felt to be between 2.0 cm and 2.5 cm in greatest dimension  - No invasive tumor is seen at inked margins, but there is a focus of DCIS seen within approximately 1mm of the inked medial margin  - no LVI  - no LCIS  - 0/2 LN's  at least Stage IIA - pT2, pN0, cM0, G2.    - Dr Coronado     2018 -  Cancer Staged    Stage IIA - pT2, pN0, cM0, G2.       2019 Genomic Testing    Oncotype DX score: 13     2019 -  Hormone Therapy    anastrozole 1 mg daily as adjuvant endocrine therapy    - Dr Daley       2019 - 4/3/2019 Radiation    Course: C1    Plan ID Energy Fractions Dose per Fraction (cGy) Dose Correction (cGy) Total Dose Delivered (cGy) Elapsed Days   R Breast 10X/6X 25 / 25 200 0 5,000 40   R BRST BOOST 16E 5 / 5 200 0 1,000 7      Treatment dates:  C1: 2019 - 4/3/2019       Metastatic colon cancer to liver (HCC)   2023 Genetic Testing    CARIS Results:   KRAS Pathogenic Variant Exon 2  p.G12D : lack of benefit of cetuximab, panitumumab Level 2   No other actionable  "mutation; MSI stable; TMB low   MiFOLOXAi Results: \"Decreased Benefit of FOLFOX + Bevacizumab in first line metastatic CRC.  This patient may achieve improved results by receiving an alternative to FOLFOX, such as FOLFIRI, as their initial regimen. As an adjustment to frontline FOLFOXIRI following toxicity: This patient may achieve improved results by removing the oxaliplatin portion of their regimen\".         7/11/2023 Initial Diagnosis    Metastatic colon cancer to liver (HCC)     7/13/2023 -  Cancer Staged    Staging form: Colon and Rectum, AJCC 8th Edition  - Clinical stage from 7/13/2023: cT3, cN0, pM1 - Signed by Harika Medina DO on 9/28/2023  Total positive nodes: 0       7/31/2023 -  Chemotherapy    cyanocobalamin, 1,000 mcg, Intramuscular, Every 30 days, 7 of 9 cycles  Administration: 1,000 mcg (5/9/2024), 1,000 mcg (5/24/2024), 1,000 mcg (6/20/2024), 1,000 mcg (7/3/2024), 1,000 mcg (7/18/2024), 1,000 mcg (8/1/2024)  potassium chloride, 20 mEq, Intravenous, Once, 2 of 2 cycles  Administration: 20 mEq (11/6/2023), 20 mEq (11/20/2023)  alteplase (CATHFLO), 2 mg, Intracatheter, Every 1 Minute as needed, 21 of 23 cycles  Administration: 2 mg (1/3/2024)  pegfilgrastim (NEULASTA), 6 mg, Subcutaneous, Once, 12 of 12 cycles  Administration: 6 mg (10/25/2023), 6 mg (11/8/2023), 6 mg (11/22/2023), 6 mg (12/6/2023), 6 mg (12/20/2023), 6 mg (1/12/2024), 6 mg (1/25/2024), 6 mg (4/25/2024), 6 mg (5/9/2024), 6 mg (5/24/2024), 6 mg (6/20/2024)  fluorouracil (ADRUCIL), 895 mg, Intravenous, Once, 21 of 23 cycles  Dose modification: 320 mg/m2 (original dose 400 mg/m2, Cycle 11)  Administration: 900 mg (7/31/2023), 900 mg (8/14/2023), 900 mg (8/28/2023), 900 mg (9/11/2023), 900 mg (9/25/2023), 900 mg (10/9/2023), 900 mg (10/23/2023), 895 mg (11/6/2023), 895 mg (11/20/2023), 895 mg (12/4/2023), 700 mg (12/18/2023), 680 mg (1/10/2024), 680 mg (1/23/2024), 625 mg (4/23/2024), 625 mg (5/7/2024), 625 mg (5/22/2024), 625 mg " (6/4/2024), 625 mg (6/18/2024), 625 mg (7/1/2024), 625 mg (7/16/2024), 625 mg (7/30/2024)  nivolumab (OPDIVO) IVPB, 240 mg (100 % of original dose 240 mg), Intravenous, Once, 13 of 15 cycles  Dose modification: 240 mg (original dose 240 mg, Cycle 9)  Administration: 240 mg (11/20/2023), 240 mg (12/4/2023), 240 mg (12/18/2023), 240 mg (1/10/2024), 240 mg (1/23/2024), 240 mg (4/23/2024), 240 mg (5/7/2024), 240 mg (5/22/2024), 240 mg (6/4/2024), 240 mg (6/18/2024), 240 mg (7/1/2024), 240 mg (7/16/2024), 240 mg (7/30/2024)  leucovorin calcium IVPB, 896 mg, Intravenous, Once, 21 of 23 cycles  Administration: 900 mg (7/31/2023), 900 mg (8/14/2023), 900 mg (8/28/2023), 900 mg (9/11/2023), 900 mg (9/25/2023), 900 mg (10/9/2023), 900 mg (10/23/2023), 900 mg (11/6/2023), 900 mg (11/20/2023), 900 mg (12/4/2023), 900 mg (12/18/2023), 850 mg (1/10/2024), 850 mg (1/23/2024), 800 mg (4/23/2024), 800 mg (5/7/2024), 800 mg (5/22/2024), 800 mg (6/4/2024), 800 mg (6/18/2024), 800 mg (7/1/2024), 800 mg (7/16/2024), 800 mg (7/30/2024)  oxaliplatin (ELOXATIN) chemo infusion, 85 mg/m2 = 190.4 mg, Intravenous, Once, 12 of 12 cycles  Dose modification: 68 mg/m2 (original dose 85 mg/m2, Cycle 11, Reason: Other (Must fill in a comment), Comment: increased fatigue)  Administration: 190.4 mg (7/31/2023), 190.4 mg (8/14/2023), 200 mg (8/28/2023), 200 mg (9/11/2023), 200 mg (9/25/2023), 200 mg (10/9/2023), 200 mg (10/23/2023), 200 mg (11/6/2023), 200 mg (11/20/2023), 190.4 mg (12/4/2023), 150 mg (12/18/2023), 150 mg (1/10/2024)  fluorouracil (ADRUCIL) ambulatory infusion Soln, 1,200 mg/m2/day = 5,375 mg, Intravenous, Over 46 hours, 21 of 23 cycles     9/26/2023 -  Cancer Staged    Staging form: Colon and Rectum, AJCC 8th Edition  - Pathologic: pT3, pN0, cM1 - Signed by Vickie Israel MD on 4/3/2024  Total positive nodes: 0       3/12/2024 Surgery    A. Terminal ileum, appendix, right colon (right hemicolectomy):  - Adenocarcinoma (5.2cm)  -  Forty-nine (49) lymph nodes negative for carcinoma (0/49)    - Acellular mucin present in one (1) lymph node   - See staging synoptic (ypT3N0)     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HCC)   7/27/2023 Initial Diagnosis    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HCC)     7/27/2023 -  Cancer Staged    Staging form: Pharynx - Oropharynx, AJCC 8th Edition  - Clinical stage from 7/27/2023: Stage III (cT1, cN3, cM0, p16+) - Signed by Evan Plummer MD on 7/27/2023  Stage prefix: Initial diagnosis         Past Medical History:   Diagnosis Date    Anemia     Arthritis     Body mass index (BMI) 40.0-44.9, adult (HCC)     Breast cancer (HCC) 12/17/2018    Cancer (HCC)     right breast, colon, liver, right tonsil    Cervical lymphadenopathy     CT neck on 3/30/2023- Large right level 2A lymphadenopathy as described above and suspicious for neoplasm.  Correlation with histopathology is recommended.  Mild asymmetric prominence of the right palatine tonsil with otherwise no definitive nodular enhancing lesions identified along the course of the aerodigestive tract.     5/26/23- FNA of this node was nonrevealing for tissue etiology, but it d    Encounter for screening for HIV 07/07/2022    Follow-up examination 04/04/2023    GERD (gastroesophageal reflux disease)     Hyperlipidemia     Hypertension     Obesity     Stroke (HCC)     TIA     Stroke (HCC)     TIA     Transaminitis 09/22/2021    Vasomotor rhinitis     Refilled flonase today. Stopped taking since January 2022     Past Surgical History:   Procedure Laterality Date    BREAST BIOPSY Right 11/23/2018    us guided bx cancer    BREAST SURGERY      COLONOSCOPY      ESOPHAGOSCOPY N/A 07/20/2023    Procedure: ESOPHAGOSCOPY;  Surgeon: David Chapa MD;  [facility];  Service: ENT    HEMICOLOECTOMY W/ ANASTOMOSIS Right 3/12/2024    Procedure: RIGHT COLECTOMY WITH ROBOTICS;  Surgeon: Vickie Israel MD;  Location[facility];  Service: Surgical Oncology     IR BIOPSY LIVER MASS  06/27/2023    IR CRYOABLATION  6/3/2024    IR PORT CHECK  01/03/2024    IR PORT PLACEMENT  07/28/2023    IR PORT STRIPPING  01/09/2024    LAPAROTOMY N/A 3/12/2024    Procedure: LAPAROTOMY EXPLORATORY W/ ROBOTICS;  Surgeon: Vickie Israel MD;  [facility];  Service: Surgical Oncology    [place] [facility] INCL FLUOR GDNCE DX W/CELL WASHG SPX N/A 07/20/2023    Procedure: BRONCHOSCOPY;  Surgeon: David Chapa MD;  [facility];  Service: ENT    [place] LARYNGOSCOPY W/BIOPSY MICROSCOPE/TELESCOPE N/A 07/20/2023    Procedure: MICRODIRECT LARYNGOSCOPY WITH BIOPSY;  Surgeon: Davdi Chapa MD;  [facility];  Service: ENT    [place] MASTECTOMY PARTIAL W/AXILLARY LYMPHADENECTOMY Right 12/20/2018    Procedure: ULTRASOUND LOCALIZED PARTIAL MASTECTOMY W/SENTINEL NODE BIOPSY POSS AXILLARY DISSECTION;  Surgeon: Zahida Coronado MD;  Location: [facility];  Service: General    TUBAL LIGATION      US GUIDED BREAST BIOPSY RIGHT COMPLETE Right 11/23/2018    US GUIDED INJECTION FOR RESEARCH STUDY  12/20/2018    US GUIDED INJECTION FOR RESEARCH STUDY  12/07/2018    US GUIDED INJECTION FOR RESEARCH STUDY  05/03/2019    US GUIDED LYMPH NODE BIOPSY RIGHT  05/26/2023     Family History   Problem Relation Age of Onset    Colon cancer Mother 58    Hypertension Mother     Pancreatic cancer Father 60    Hypertension Daughter     Hypertension Son      Social History   Social History     Substance and Sexual Activity   Alcohol Use Never     Social History     Substance and Sexual Activity   Drug Use Never     Social History     Tobacco Use   Smoking Status Never    Passive exposure: Never   Smokeless Tobacco Never   Tobacco Comments    NO TOBACCO USE     Meds/Allergies     Current Outpatient Medications:     anastrozole (ARIMIDEX) 1 mg tablet, Take 1 tablet (1 mg total) by mouth daily, Disp: 90 tablet, Rfl: 3    atorvastatin (LIPITOR) 40 mg tablet, Take 1 tablet (40 mg total) by mouth daily at bedtime,  Disp: 30 tablet, Rfl: 2    docusate sodium (COLACE) 50 mg capsule, Take 1 capsule (50 mg total) by mouth 2 (two) times a day, Disp: 90 capsule, Rfl: 1    ergocalciferol (VITAMIN D2) 50,000 units, Take 1 capsule (50,000 Units total) by mouth once a week, Disp: 90 capsule, Rfl: 3    folic acid (FOLVITE) 1 mg tablet, Take 1 tablet (1 mg total) by mouth in the morning, Disp: 90 tablet, Rfl: 3    gabapentin (NEURONTIN) 300 mg capsule, Take 1 pills in the morning, 1 pill at lunch and 2 pills before bed, Disp: 120 capsule, Rfl: 3    hydrocortisone 1 % ointment, , Disp: , Rfl:     lidocaine-prilocaine (EMLA) cream, Apply topically as needed for mild pain, Disp: 30 g, Rfl: 3    losartan (COZAAR) 50 mg tablet, Take 1 tablet (50 mg total) by mouth daily, Disp: 90 tablet, Rfl: 5    mirtazapine (REMERON) 15 mg tablet, Take 1 tablet (15 mg total) by mouth daily at bedtime Patient is also on a 30 mg tablet, for a total dose of 45 mg, Disp: 30 tablet, Rfl: 3    mirtazapine (REMERON) 30 mg tablet, Take 1 tablet (30 mg total) by mouth daily at bedtime, Disp: 30 tablet, Rfl: 3    omeprazole (PriLOSEC) 20 mg delayed release capsule, Take 1 capsule (20 mg total) by mouth daily, Disp: 30 capsule, Rfl: 5    ondansetron (ZOFRAN) 8 mg tablet, Take 1 tablet (8 mg total) by mouth every 8 (eight) hours as needed for nausea or vomiting, Disp: 30 tablet, Rfl: 3    potassium chloride (Klor-Con M20) 20 mEq tablet, Take 1 tablet (20 mEq total) by mouth 2 (two) times a day, Disp: 60 tablet, Rfl: 1    prochlorperazine (COMPAZINE) 10 mg tablet, Take 1 tablet (10 mg total) by mouth every 6 (six) hours as needed for nausea or vomiting, Disp: 30 tablet, Rfl: 3    vitamin B-12 (VITAMIN B-12) 1,000 mcg tablet, , Disp: , Rfl:     ferrous sulfate 325 (65 Fe) mg tablet, Take 1 tablet (325 mg total) by mouth daily with breakfast (Patient not taking: Reported on 7/12/2024), Disp: 30 tablet, Rfl: 0  No Known Allergies    Review of Systems  Constitutional:   "Negative for activity change, appetite change, fatigue and unexpected weight change.   HENT:  Positive for trouble swallowing and voice change. Negative for sore throat.    Eyes: Negative.    Respiratory:  Negative for cough, chest tightness and shortness of breath.    Cardiovascular:  Negative for chest pain.   Gastrointestinal:  Negative for constipation, diarrhea, rectal pain and vomiting.   Endocrine: Positive for cold intolerance. Negative for heat intolerance.   Genitourinary: Negative.    Musculoskeletal: Negative.    Skin: Negative.    Allergic/Immunologic: Negative for environmental allergies and food allergies.   Neurological:  Positive for light-headedness and numbness. Negative for dizziness, weakness and headaches.        NEUROPATHY IN FINGERS/TOES AND LEGS.     Hematological:  Does not bruise/bleed easily.   Psychiatric/Behavioral: Negative.        Clinical Trial: no     OB/GYN History:  The patient underwent menarche at 1 years  Menopause Status   No LMP recorded. Patient is postmenopausal.  Menopause at 51} years.  Menopause Reason   Hormone replacement therapy: no.  Years used NO   0   Para 0   Age at first delivery being 22 years.   Nursing: no.   Birth control pills: yes.  Years used 1YR     Pregnancy test needed:  no     ONCOTYPE/MAMMOPRINT results      Prior Radiation YES-BREAST CANCER-5YRS AGO    OBJECTIVE:   /75 (BP Location: Left arm, Patient Position: Sitting, Cuff Size: Large)   Temp (!) 96.8 °F (36 °C) (Temporal)   Ht 5' 5\" (1.651 m)   Wt 92.2 kg (203 lb 3.2 oz)   SpO2 99%   BMI 33.81 kg/m²   Pain Assessment:  0  Performance Status: ECOG/Zubrod/WHO: 1 - Symptomatic but completely ambulatory - 2     Physical Exam  Constitutional:       General: She is not in acute distress.     Appearance: Normal appearance. She is well-developed. She is not diaphoretic.   HENT:      Head: Normocephalic and atraumatic.      Mouth/Throat:      Pharynx: No oropharyngeal exudate.   Eyes:      " General: No scleral icterus.     Conjunctiva/sclera: Conjunctivae normal.      Pupils: Pupils are equal, round, and reactive to light.   Neck:      Thyroid: No thyromegaly.      Trachea: No tracheal deviation.   Cardiovascular:      Rate and Rhythm: Normal rate and regular rhythm.      Heart sounds: Normal heart sounds.   Pulmonary:      Effort: Pulmonary effort is normal. No respiratory distress.      Breath sounds: Normal breath sounds. No stridor. No wheezing, rhonchi or rales.   Chest:      Chest wall: No tenderness.   Abdominal:      General: Bowel sounds are normal. There is no distension.      Palpations: Abdomen is soft. There is no mass.      Tenderness: There is no abdominal tenderness.   Musculoskeletal:         General: No tenderness. Normal range of motion.      Cervical back: Normal range of motion and neck supple.   Lymphadenopathy:      Cervical: No cervical adenopathy.      Upper Body:      Right upper body: No supraclavicular adenopathy.      Left upper body: No supraclavicular adenopathy.   Skin:     General: Skin is warm and dry.      Coloration: Skin is not jaundiced or pale.      Findings: No erythema or rash.   Neurological:      General: No focal deficit present.      Mental Status: She is alert and oriented to person, place, and time.      Cranial Nerves: No cranial nerve deficit.      Coordination: Coordination normal.   Psychiatric:         Mood and Affect: Mood normal.         Behavior: Behavior normal.         Thought Content: Thought content normal.         Judgment: Judgment normal.        RESULTS  Lab Results    Chemistry        Component Value Date/Time    K 3.4 (L) 07/29/2024 1503    K 4.0 02/20/2024 1715     07/29/2024 1503     02/20/2024 1715    CO2 26 07/29/2024 1503    CO2 26 03/12/2024 1926    CO2 23 02/20/2024 1715    BUN 14 07/29/2024 1503    BUN 12 02/20/2024 1715    CREATININE 0.75 07/29/2024 1503    CREATININE 0.97 02/20/2024 1715        Component Value  Date/Time    CALCIUM 9.1 07/29/2024 1503    CALCIUM 9.6 02/20/2024 1715    ALKPHOS 142 (H) 07/29/2024 1503    ALKPHOS 96 02/20/2024 1715    AST 45 (H) 07/29/2024 1503    AST 83 (H) 02/20/2024 1715    ALT 31 07/29/2024 1503    ALT 50 02/20/2024 1715        Lab Results   Component Value Date    WBC 5.22 07/29/2024    HGB 9.2 (L) 07/29/2024    HCT 28.4 (L) 07/29/2024    MCV 89 07/29/2024     07/29/2024     Imaging Studies  NM PET CT skull base to mid thigh    Result Date: 8/6/2024  Narrative: WHOLE-BODY PET/CT SCAN INDICATION: C18.9: Malignant neoplasm of colon, unspecified C78.7: Secondary malignant neoplasm of liver and intrahepatic bile duct, squamous cell carcinoma the right tonsil, right breast cancer. Restaging. MODIFIER: PS COMPARISON: PET/CT dated April 18, 2024 CELL TYPE:  metastatic adenocarcinoma, s/o colorectal primary (bx 6/22/23 liver +) TECHNIQUE: Following the intravenous administration of 10.4 mCi F-18-FDG, dedicated head and neck images were obtained from the skull vertex to the thoracic inlet with the patient's arms at their side 60 minutes post injection. Subsequently, whole body images were obtained from the thoracic inlet through the proximal femurs with the patients arms above their head.  Multiplanar attenuation corrected and non attenuation corrected PET images are available for interpretation. Contiguous, low dose, axial CT sections were also obtained from the head through the thoracic inlet and from the thoracic inlet through the proximal femurs. Intravenous contrast material was not utilized.  Additional coronal and sagittal images are available as well as fused PET/CT images.  This examination, like all CT scans performed in the [facility], was performed utilizing techniques to minimize radiation dose exposure, including the use of iterative reconstruction and automated exposure control. Fasting serum glucose: 101 mg/dl FINDINGS: VISUALIZED BRAIN: No acute  abnormalities are seen. HEAD/NECK: Increasing focal FDG activity involving the midline posterior prevertebral soft tissues at the level of the nasopharynx with associated increased nodular soft tissue measuring approximately 0.8 x 0.7 cm on image 26 of series 17 with max SUV of 14, previously approximately 0.6 cm with max SUV of 5.3. While findings could reflect inflammatory activity, developing hypermetabolic malignancy in this location is the diagnosis of exclusion. Best seen on PET image 37 of series 26 is new/increased asymmetric nodular activity involving the left tonsillar region with max SUV of 7.9. While findings could reflect inflammatory activity, given the asymmetry and history of previous right tonsillar carcinoma, correlation is recommended to exclude developing left tonsillar carcinoma. Increasing FDG activity associated with bilateral upper cervical lymph node -For example, in the right activity associated with stable enlarged right upper cervical lymph node measuring 1.5 cm in short axis on image 45 of series 17 with max SUV of 5.1, previously similar in size with max SUV of 3.4. -Representative increasing activity in the left associated with stable in size left upper cervical adenopathy T2 series 17, left upper cervical lymph node measures 0.8 cm in short axis with max SUV of, previously similar in size with max SUV of 3.4. CT images: Intracranial atherosclerotic calcification is noted. Partial opacification of the posterior ethmoid air cells. Again noted is multinodular thyroid gland. CHEST: Intense focal FDG activity localizing to the distal tip of right-sided chest port within the distal superior vena cava favored to reflect injected activity within the right-sided chest port with hypermetabolic metastatic disease in this location considered less likely as malignancy within the SVC without adjacent hypermetabolic soft tissue is atypical. Focal nonspecific FDG activity involving the mid thoracic  esophagus at the level of the caity with max SUV of 5.1 (for example PET image 32), possibly inflammatory. CT images: Mild nonspecific asymmetric right breast skin thickening. Right-sided chest port extends towards the caval atrial junction. Atherosclerotic vascular calcifications including those of the coronary arteries are noted. Probable small hiatal hernia. ABDOMEN: As compared to prior PET/CT, there is new focal activity towards the anterior right hepatic dome on PET image 62 series 12 which is difficult to characterize on low-dose unenhanced CT but measures approximately 0.9 cm on PET imaging with max SUV of 11.0 consistent with hypermetabolic hepatic metastatic disease. Best seen on PET image 85 of series 12 is increasing nodular FDG activity about patient's known inferior right hepatic lobe metastatic lesion with new focus lateral to a stable medial focus of FDG activity measuring approximately 3.4 x 1.4 cm in confluence on PET imaging with max SUV of 11.0, previously 2.0 x 1.4 cm with max SUV of 7.2. Persistent mild nonspecific activity about the enterocolonic anastomosis in the right hemicolectomy site, possibly inflammatory in etiology given stability with subtle locally recurrent malignancy in this location not excluded. CT images: Stable left hepatic lobe hypodensity image 74 of series 3 measuring 2.4 cm. Patient's known additional right hepatic lobe lesion seen on prior contrast-enhanced CT are poorly characterized on current low-dose unenhanced CT. Cholelithiasis. Atherosclerotic vascular calcifications are noted. PELVIS: Nonspecific nonuniform, likely bowel activity anterior to the upper uterus on PET image 165 of series 12, possibly physiologic/inflammatory in etiology. CT images: Stable large right adnexal mass, possibly related to underlying myomatous uterus but incompletely characterized on PET/CT. OSSEOUS STRUCTURES: No FDG avid lesions are  seen. CT images: Degenerative changes are noted  involving the spine.     Impression: 1. Findings suspicious for interval progression of hypermetabolic malignancy/metastatic disease involving the neck with increased FDG activity associated with essentially stable in size bilateral upper cervical lymphadenopathy. -In addition, note is made of increasing hypermetabolic soft tissue involving the midline of the prevertebral cervical soft tissues at the level of the nasopharynx; findings could reflect inflammatory activity, correlation with direct visualization and possibly tissue sampling is recommended to exclude developing hypermetabolic malignancy in this location. -Additional note is made of increasing asymmetric nodular activity in the left tonsillar region; while findings could reflect inflammatory activity, given history of right tonsillar carcinoma, correlation is recommended to exclude developing left tonsillar carcinoma. 2. Interval progression of hypermetabolic hepatic metastatic disease, presumably related to history of colon cancer. Findings can be further characterized with contrast-enhanced MRI/CT as clinically indicated. Please see above for details and additional findings. The study was marked in EPIC for significant notification. Workstation performed: [id number]     Pathology: See Above    ASSESSMENT  1. 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HCC)  Ambulatory Referral to Radiation Oncology    Radiation Simulation Treatment      2. Metastatic colon cancer to liver (HCC)        3. Malignant neoplasm of upper-outer quadrant of right breast in female, estrogen receptor positive (HCC)          Cancer Staging   Malignant neoplasm of right female breast (HCC)  Staging form: Breast, AJCC 8th Edition  - Pathologic: Stage IA (pT2, pN0, cM0, G2, ER: Positive, TX: Positive, HER2: Negative) - Signed by Rosalva Drake MD on 1/11/2019  Histologic grading system: 3 grade system    Metastatic colon cancer to liver (HCC)  Staging form: Colon and Rectum, AJCC 8th  Edition  - Clinical stage from 7/13/2023: cT3, cN0, pM1 - Signed by Harika Medina DO on 9/28/2023  Total positive nodes: 0  - Pathologic: pT3, pN0, cM1 - Signed by Vickie Israel MD on 4/3/2024  Total positive nodes: 0    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HCC)  Staging form: Pharynx - Oropharynx, AJCC 8th Edition  - Clinical stage from 7/27/2023: Stage III (cT1, cN3, cM0, p16+) - Signed by Evan Plummer MD on 7/27/2023  Stage prefix: Initial diagnosis        PLAN/DISCUSSION  Orders Placed This Encounter   Procedures    Radiation Simulation Treatment          Maira Moura is a 69 y.o. year old female with history of right breast carcinoma status post lumpectomy and radiation therapy in 2019.  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  She had cryoablation to the liver on Evelin 3, 2024.  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  She has been receiving treatment with 5-FU leucovorin and Nivolumab.  PET/CT study August 5, 2024 shows progression within the neck as well as the tonsil.  She was discussed with head and neck multidisciplinary case review on August 12, 2024 with recommendation to see Dr. Chapa August 16, 2024 and to consider radiation therapy along with chemotherapy.  She had previously been seen by dentistry/oral surgery and had dental extractions on September 1, 2023.  She has a full upper denture in the last 10 years.  She had extractions of mandibular teeth that were in poor repair with 2 incisors that are remaining.  She denies any pain or difficulty with her teeth.  She is seen for consultation today for definitive radiation therapy to the head and neck region over 7 weeks/35 fractions with total dose of 7000 cGy along with concurrent chemotherapy with   "Joe.  We discussed the rationale for treatment including the acute side effects and the potential complications with her.  She does consent to proceed with the treatment.  She will return next week for simulation and treatment planning purposes.  We discussed that she would likely need PEG tube placement secondary to dysphagia.  She declines upfront PEG tube placement now and instead would rather rate for placement in the future when this is needed.      Sami Tolbert MD  8/20/2024,1:00 PM      Portions of the record may have been created with voice recognition software.  Occasional wrong word or \"sound a like\" substitutions may have occurred due to the inherent limitations of voice recognition software.  Read the chart carefully and recognize, using context, where substitutions have occurred.          "

## 2024-08-22 ENCOUNTER — TELEPHONE (OUTPATIENT)
Dept: NUTRITION | Facility: CLINIC | Age: 69
End: 2024-08-22

## 2024-08-22 NOTE — TELEPHONE ENCOUNTER
Received notification by [facility] on 8/21/24 that pt has triggered for oncology nutrition care (reason for referral: HNC dx and Malnutrition Screening Tool (MST) Triggers: scored a 0 indicating No recent wt loss and is not eating poorly due to a decreased appetite. (Date of MST: 8/21/24)).    Contacted Maira today to establish care.  Called x 2 attempts, call could not be completed either time. Will try again within the next few days.

## 2024-08-26 ENCOUNTER — TELEPHONE (OUTPATIENT)
Dept: FAMILY MEDICINE CLINIC | Facility: CLINIC | Age: 69
End: 2024-08-26

## 2024-08-26 NOTE — TELEPHONE ENCOUNTER
Second attempt made today to reach Maira to establish care and discuss her nutrition.  No answer.  Left a voice message requesting a call back at Maira's convenience.

## 2024-08-26 NOTE — TELEPHONE ENCOUNTER
Patient Left VM requesting medication refill.   first attempt to contact patient. left message to return my call on answering machine Please assist        Medication refiil

## 2024-08-28 PROCEDURE — 77263 THER RADIOLOGY TX PLNG CPLX: CPT | Performed by: RADIOLOGY

## 2024-08-29 ENCOUNTER — RADIATION THERAPY TREATMENT (OUTPATIENT)
Dept: RADIATION ONCOLOGY | Facility: CLINIC | Age: 69
End: 2024-08-29
Attending: INTERNAL MEDICINE
Payer: MEDICARE

## 2024-08-29 PROCEDURE — 77334 RADIATION TREATMENT AID(S): CPT | Performed by: RADIOLOGY

## 2024-08-29 PROCEDURE — 77470 SPECIAL RADIATION TREATMENT: CPT | Performed by: RADIOLOGY

## 2024-09-03 ENCOUNTER — TELEPHONE (OUTPATIENT)
Dept: HEMATOLOGY ONCOLOGY | Facility: CLINIC | Age: 69
End: 2024-09-03

## 2024-09-03 ENCOUNTER — PREP FOR PROCEDURE (OUTPATIENT)
Dept: INTERVENTIONAL RADIOLOGY/VASCULAR | Facility: CLINIC | Age: 69
End: 2024-09-03

## 2024-09-03 ENCOUNTER — APPOINTMENT (OUTPATIENT)
Dept: RADIATION ONCOLOGY | Facility: CLINIC | Age: 69
End: 2024-09-03
Attending: RADIOLOGY
Payer: MEDICARE

## 2024-09-03 ENCOUNTER — OFFICE VISIT (OUTPATIENT)
Dept: HEMATOLOGY ONCOLOGY | Facility: CLINIC | Age: 69
End: 2024-09-03
Payer: MEDICARE

## 2024-09-03 VITALS
OXYGEN SATURATION: 99 % | SYSTOLIC BLOOD PRESSURE: 124 MMHG | HEIGHT: 65 IN | WEIGHT: 198 LBS | HEART RATE: 93 BPM | RESPIRATION RATE: 18 BRPM | BODY MASS INDEX: 32.99 KG/M2 | DIASTOLIC BLOOD PRESSURE: 82 MMHG | TEMPERATURE: 98 F

## 2024-09-03 DIAGNOSIS — E87.6 HYPOKALEMIA: ICD-10-CM

## 2024-09-03 DIAGNOSIS — C78.7 METASTATIC COLON CANCER TO LIVER (HCC): ICD-10-CM

## 2024-09-03 DIAGNOSIS — C18.9 METASTATIC COLON CANCER TO LIVER (HCC): ICD-10-CM

## 2024-09-03 DIAGNOSIS — G62.9 NEUROPATHY: Primary | ICD-10-CM

## 2024-09-03 DIAGNOSIS — C09.9 RIGHT TONSILLAR SQUAMOUS CELL CARCINOMA (HCC): ICD-10-CM

## 2024-09-03 DIAGNOSIS — C09.9 RIGHT TONSILLAR SQUAMOUS CELL CARCINOMA (HCC): Primary | ICD-10-CM

## 2024-09-03 PROCEDURE — 99215 OFFICE O/P EST HI 40 MIN: CPT | Performed by: INTERNAL MEDICINE

## 2024-09-03 PROCEDURE — G2211 COMPLEX E/M VISIT ADD ON: HCPCS | Performed by: INTERNAL MEDICINE

## 2024-09-03 RX ORDER — LANOLIN ALCOHOL/MO/W.PET/CERES
50 CREAM (GRAM) TOPICAL DAILY
Qty: 90 TABLET | Refills: 3 | Status: SHIPPED | OUTPATIENT
Start: 2024-09-03

## 2024-09-03 RX ORDER — SODIUM CHLORIDE 9 MG/ML
20 INJECTION, SOLUTION INTRAVENOUS ONCE
OUTPATIENT
Start: 2024-09-24

## 2024-09-03 RX ORDER — POTASSIUM CHLORIDE 1500 MG/1
TABLET, EXTENDED RELEASE ORAL
Qty: 60 TABLET | Refills: 0 | OUTPATIENT
Start: 2024-09-03

## 2024-09-03 RX ORDER — SODIUM CHLORIDE 9 MG/ML
20 INJECTION, SOLUTION INTRAVENOUS ONCE
OUTPATIENT
Start: 2024-10-01

## 2024-09-03 RX ORDER — SODIUM CHLORIDE 9 MG/ML
75 INJECTION, SOLUTION INTRAVENOUS CONTINUOUS
OUTPATIENT
Start: 2024-09-03

## 2024-09-03 RX ORDER — PROCHLORPERAZINE MALEATE 10 MG
10 TABLET ORAL EVERY 6 HOURS PRN
Qty: 90 TABLET | Refills: 2 | Status: SHIPPED | OUTPATIENT
Start: 2024-09-03

## 2024-09-03 RX ORDER — ONDANSETRON 8 MG/1
8 TABLET, FILM COATED ORAL EVERY 8 HOURS PRN
Qty: 90 TABLET | Refills: 2 | Status: SHIPPED | OUTPATIENT
Start: 2024-09-03

## 2024-09-03 RX ORDER — POTASSIUM CHLORIDE 1500 MG/1
20 TABLET, EXTENDED RELEASE ORAL 2 TIMES DAILY
Qty: 90 TABLET | Refills: 1 | Status: SHIPPED | OUTPATIENT
Start: 2024-09-03

## 2024-09-03 RX ORDER — SODIUM CHLORIDE 9 MG/ML
20 INJECTION, SOLUTION INTRAVENOUS ONCE
OUTPATIENT
Start: 2024-09-17

## 2024-09-03 NOTE — TELEPHONE ENCOUNTER
Spoke with patient and she states she forgot to inform Dr. Medina of the refills she needed. Patient is requesting potassium, compazine, and zofran, 90 pills. I informed her that we will send these to her pharmacy shortly. Patient verbalized understanding.

## 2024-09-03 NOTE — TELEPHONE ENCOUNTER
Spoke with patient to let her know that Dr. Medina spoke with Dr. Tolbert and she would like to see her in the office today at 11. Patient is agreeable and lyft has been set up for the patient.

## 2024-09-03 NOTE — PROGRESS NOTES
Medical Oncology: Progress Note     Primary Care Provider: Fanta Turner MD        MRN: [id number] : 1955    Reason for Encounter: Follow-Up Visit.  Interval Events: Maira Moura is a 69-year-old postmenopausal female, with a complex Oncologic history, including early-stage hormone-positive HER-2 negative right breast cancer (Status-post right lumpectomy with sentinel lymph node biopsy on 2018 and whole-breast radiation completed in 2019; On adjuvant endocrine-therapy with Anastrozole since 2019), as well as synchronous de naveed metastatic adenocarcinoma of the mid-ascending colon (Complicated by partial-obstruction requiring right hemicolectomy on 2024) with biopsy-proven oligometastatic disease to the liver (Status-post cryoablation on 2024), and unresectable (At least locally-advanced versus metastatic) 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 On evaluation this morning, patient states that she is doing well. She states that her appetite is intact, and she is tolerating meal-supplementation with Ensure three-times daily, without difficulty. Dysphagia to some solid food-products (e.g. Bread) noted. Patient states that her weight has been relatively stable since her last visit. Activity-tolerance has reportedly improved, as well. Of note, patient has baseline peripheral-neuropathy involving the bilateral upper and lower extremities (e.g. Numbness of the fingers and toes), of which impairs her gait, and hinders fine-motor movements (e.g. Buttoning buttons, or zippering zippers). She does not endorse any associated neuropathic pain. Patient denies any further concerns or complaints at the present time.     ECOG Performance Status: 1-Point.    Review of Systems:  All systems reviewed and negative except otherwise listed in the History of Present Illness.    Oncology History  "  Malignant neoplasm of right female breast (HCC)   11/23/2018 Initial Diagnosis    Malignant neoplasm of right female breast (HCC)     11/23/2018 Biopsy    Rt Breast US BX 1100 8cmfn, 4 passes 12g Marquee:  - Invasive breast carcinoma of no special type (ductal NST/invasive ductal carcinoma).  - grade 2  - %  - DE 95%  - HER2 negative     12/20/2018 Surgery    Right partial mastectomy and SLN biopsy:  Infiltrating ductal carcinoma, Grade 2/3, felt to be between 2.0 cm and 2.5 cm in greatest dimension  - No invasive tumor is seen at inked margins, but there is a focus of DCIS seen within approximately 1mm of the inked medial margin  - no LVI  - no LCIS  - 0/2 LN's  at least Stage IIA - pT2, pN0, cM0, G2.    - Dr Coronado     12/20/2018 -  Cancer Staged    Stage IIA - pT2, pN0, cM0, G2.       1/4/2019 Genomic Testing    Oncotype DX score: 13     2/1/2019 -  Hormone Therapy    anastrozole 1 mg daily as adjuvant endocrine therapy    - Dr Daley       2/14/2019 - 4/3/2019 Radiation    Course: C1    Plan ID Energy Fractions Dose per Fraction (cGy) Dose Correction (cGy) Total Dose Delivered (cGy) Elapsed Days   R Breast 10X/6X 25 / 25 200 0 5,000 40   R BRST BOOST 16E 5 / 5 200 0 1,000 7      Treatment dates:  C1: 2/14/2019 - 4/3/2019       Metastatic colon cancer to liver (HCC)   7/6/2023 Genetic Testing    CARIS Results:   KRAS Pathogenic Variant Exon 2  p.G12D : lack of benefit of cetuximab, panitumumab Level 2   No other actionable mutation; MSI stable; TMB low   MiFOLOXAi Results: \"Decreased Benefit of FOLFOX + Bevacizumab in first line metastatic CRC.  This patient may achieve improved results by receiving an alternative to FOLFOX, such as FOLFIRI, as their initial regimen. As an adjustment to frontline FOLFOXIRI following toxicity: This patient may achieve improved results by removing the oxaliplatin portion of their regimen\".         7/11/2023 Initial Diagnosis    Metastatic colon cancer to liver (HCC)   "   7/13/2023 -  Cancer Staged    Staging form: Colon and Rectum, AJCC 8th Edition  - Clinical stage from 7/13/2023: cT3, cN0, pM1 - Signed by Harika Medina DO on 9/28/2023  Total positive nodes: 0       7/31/2023 -  Chemotherapy    cyanocobalamin, 1,000 mcg, Intramuscular, Every 30 days, 7 of 9 cycles  Administration: 1,000 mcg (5/9/2024), 1,000 mcg (5/24/2024), 1,000 mcg (6/20/2024), 1,000 mcg (7/3/2024), 1,000 mcg (7/18/2024), 1,000 mcg (8/1/2024)  potassium chloride, 20 mEq, Intravenous, Once, 2 of 2 cycles  Administration: 20 mEq (11/6/2023), 20 mEq (11/20/2023)  alteplase (CATHFLO), 2 mg, Intracatheter, Every 1 Minute as needed, 21 of 23 cycles  Administration: 2 mg (1/3/2024)  pegfilgrastim (NEULASTA), 6 mg, Subcutaneous, Once, 12 of 12 cycles  Administration: 6 mg (10/25/2023), 6 mg (11/8/2023), 6 mg (11/22/2023), 6 mg (12/6/2023), 6 mg (12/20/2023), 6 mg (1/12/2024), 6 mg (1/25/2024), 6 mg (4/25/2024), 6 mg (5/9/2024), 6 mg (5/24/2024), 6 mg (6/20/2024)  fluorouracil (ADRUCIL), 895 mg, Intravenous, Once, 21 of 23 cycles  Dose modification: 320 mg/m2 (original dose 400 mg/m2, Cycle 11)  Administration: 900 mg (7/31/2023), 900 mg (8/14/2023), 900 mg (8/28/2023), 900 mg (9/11/2023), 900 mg (9/25/2023), 900 mg (10/9/2023), 900 mg (10/23/2023), 895 mg (11/6/2023), 895 mg (11/20/2023), 895 mg (12/4/2023), 700 mg (12/18/2023), 680 mg (1/10/2024), 680 mg (1/23/2024), 625 mg (4/23/2024), 625 mg (5/7/2024), 625 mg (5/22/2024), 625 mg (6/4/2024), 625 mg (6/18/2024), 625 mg (7/1/2024), 625 mg (7/16/2024), 625 mg (7/30/2024)  nivolumab (OPDIVO) IVPB, 240 mg (100 % of original dose 240 mg), Intravenous, Once, 13 of 15 cycles  Dose modification: 240 mg (original dose 240 mg, Cycle 9)  Administration: 240 mg (11/20/2023), 240 mg (12/4/2023), 240 mg (12/18/2023), 240 mg (1/10/2024), 240 mg (1/23/2024), 240 mg (4/23/2024), 240 mg (5/7/2024), 240 mg (5/22/2024), 240 mg (6/4/2024), 240 mg (6/18/2024), 240 mg (7/1/2024), 240  mg (7/16/2024), 240 mg (7/30/2024)  leucovorin calcium IVPB, 896 mg, Intravenous, Once, 21 of 23 cycles  Administration: 900 mg (7/31/2023), 900 mg (8/14/2023), 900 mg (8/28/2023), 900 mg (9/11/2023), 900 mg (9/25/2023), 900 mg (10/9/2023), 900 mg (10/23/2023), 900 mg (11/6/2023), 900 mg (11/20/2023), 900 mg (12/4/2023), 900 mg (12/18/2023), 850 mg (1/10/2024), 850 mg (1/23/2024), 800 mg (4/23/2024), 800 mg (5/7/2024), 800 mg (5/22/2024), 800 mg (6/4/2024), 800 mg (6/18/2024), 800 mg (7/1/2024), 800 mg (7/16/2024), 800 mg (7/30/2024)  oxaliplatin (ELOXATIN) chemo infusion, 85 mg/m2 = 190.4 mg, Intravenous, Once, 12 of 12 cycles  Dose modification: 68 mg/m2 (original dose 85 mg/m2, Cycle 11, Reason: Other (Must fill in a comment), Comment: increased fatigue)  Administration: 190.4 mg (7/31/2023), 190.4 mg (8/14/2023), 200 mg (8/28/2023), 200 mg (9/11/2023), 200 mg (9/25/2023), 200 mg (10/9/2023), 200 mg (10/23/2023), 200 mg (11/6/2023), 200 mg (11/20/2023), 190.4 mg (12/4/2023), 150 mg (12/18/2023), 150 mg (1/10/2024)  fluorouracil (ADRUCIL) ambulatory infusion Soln, 1,200 mg/m2/day = 5,375 mg, Intravenous, Over 46 hours, 21 of 23 cycles     9/26/2023 -  Cancer Staged    Staging form: Colon and Rectum, AJCC 8th Edition  - Pathologic: pT3, pN0, cM1 - Signed by Vickie Israel MD on 4/3/2024  Total positive nodes: 0       3/12/2024 Surgery    A. Terminal ileum, appendix, right colon (right hemicolectomy):  - Adenocarcinoma (5.2cm)  - Forty-nine (49) lymph nodes negative for carcinoma (0/49)    - Acellular mucin present in one (1) lymph node   - See staging synoptic (ypT3N0)     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HCC)   7/27/2023 Initial Diagnosis    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HCC)     7/27/2023 -  Cancer Staged    Staging form: Pharynx - Oropharynx, AJCC 8th Edition  - Clinical stage from 7/27/2023: Stage III (cT1, cN3, cM0, p16+) - Signed by Evan Plummer MD on 7/27/2023  Stage prefix:  Initial diagnosis         Problem List:  Patient Active Problem List   Diagnosis    Primary hypertension    Mixed hyperlipidemia    Malignant neoplasm of right female breast (HCC)    Vitamin D deficiency    Prediabetes    Right thyroid nodule    GERD (gastroesophageal reflux disease)    Obesity    Metastatic colon cancer to liver (HCC)    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HCC)    Poor appetite    Nutritional anemia    Dehydration    Drug-induced constipation    Chemotherapy induced neutropenia (HCC)    Stage 3a chronic kidney disease (HCC)    Thrombocytopenia (HCC)    Neuropathy    Diastolic dysfunction    Hypokalemia    Leukemoid reaction    GOGO (acute kidney injury) (HCC)    Acute post-operative pain    At risk for constipation    At risk for delirium    Advance care planning    Physical deconditioning    History of CVA (cerebrovascular accident)    Chronic nasal congestion    Elevated LFTs    Vitamin B12 deficiency     Assessment and Plan:   Patient is a 69-year-old female, with a complex Oncologic history, including synchronous de naveed metastatic adenocarcinoma of the mid-ascending colon (Complicated by partial-obstruction requiring right hemicolectomy on March 15th, 2024) with biopsy-proven oligometastatic disease to the liver (Status-post cryoablation on June 3rd, 2024), and unresectable (At least locally-advanced versus metastatic) 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 Of note, following PET-CT on August 5th, 2024 showing interval disease progression in the neck, patient's case was presented at the Multidisciplinary Head and Neck Tumor Board on August 12th, 2024, which culminated in consensus for re-evaluation by Otolaryngology and Radiation Oncology. Patient was recommended definitive chemoradiation. Agree with concurrent chemoradiation with weekly Cisplatin 40 mg/m2, as outlined below. Will arrange for upfront PEG-tube placement and  coordination of care with Oncology Nutrition, in anticipation of the above-mentioned. Recommend close interval follow-up in approximately 3-weeks. Patient expressed understanding and agreement with the above plan. Note: Cisplatin consent was obtained during the present encounter.            Summary of Recommendations:   Will initiate definitive concurrent chemoradiation with weekly Cisplatin 40 mg/m2:  Tentatively planning for initiation the week of September 16th, 2024.  Note: Will need to monitor closely for progression of peripheral neuropathy.  Advised initiation of Vitamin B6 50 mg Daily, for peripheral neuropathy.  Ambulatory Referral placed to Interventional Radiology for PEG-tube placement.  Coordinating care with Oncology Nutrition for nutritional-recommendations.  Recommend close interval follow-up in 3-weeks    Past Medical History:   has a past medical history of Anemia, Arthritis, Body mass index (BMI) 40.0-44.9, adult (HCC), Breast cancer (HCC) (12/17/2018), Cancer (HCC), Cervical lymphadenopathy, Encounter for screening for HIV (07/07/2022), Follow-up examination (04/04/2023), GERD (gastroesophageal reflux disease), Hyperlipidemia, Hypertension, Obesity, Stroke (HCC), Stroke (HCC), Transaminitis (09/22/2021), and Vasomotor rhinitis.    Past Surgical History:   has a past surgical history that includes US guided breast biopsy right complete (Right, 11/23/2018); Breast surgery; pr mastectomy partial w/axillary lymphadenectomy (Right, 12/20/2018); Tubal ligation; Breast biopsy (Right, 11/23/2018); US guided injection for research study (12/20/2018); US guided injection for research study (12/07/2018); US guided injection for research study (05/03/2019); US guided lymph node biopsy right (05/26/2023); IR biopsy liver mass (06/27/2023); pr laryngoscopy w/biopsy microscope/telescope (N/A, 07/20/2023); pr brnchsc incl fluor gdnce dx w/cell washg spx (N/A, 07/20/2023); ESOPHAGOSCOPY (N/A, 07/20/2023); IR port  placement (07/28/2023); IR port check (01/03/2024); IR port stripping (01/09/2024); Colonoscopy; Hemicoloectomy w/ anastomosis (Right, 3/12/2024); LAPAROTOMY (N/A, 3/12/2024); and IR cryoablation (6/3/2024).    Current Medications:  Current Outpatient Medications   Medication Sig Dispense Refill    anastrozole (ARIMIDEX) 1 mg tablet Take 1 tablet (1 mg total) by mouth daily 90 tablet 3    atorvastatin (LIPITOR) 40 mg tablet Take 1 tablet (40 mg total) by mouth daily at bedtime 30 tablet 2    docusate sodium (COLACE) 50 mg capsule Take 1 capsule (50 mg total) by mouth 2 (two) times a day 90 capsule 1    ergocalciferol (VITAMIN D2) 50,000 units Take 1 capsule (50,000 Units total) by mouth once a week 90 capsule 3    ferrous sulfate 325 (65 Fe) mg tablet Take 1 tablet (325 mg total) by mouth daily with breakfast (Patient not taking: Reported on 7/12/2024) 30 tablet 0    folic acid (FOLVITE) 1 mg tablet Take 1 tablet (1 mg total) by mouth in the morning 90 tablet 3    gabapentin (NEURONTIN) 300 mg capsule Take 1 pills in the morning, 1 pill at lunch and 2 pills before bed 120 capsule 3    hydrocortisone 1 % ointment       lidocaine-prilocaine (EMLA) cream Apply topically as needed for mild pain 30 g 3    losartan (COZAAR) 50 mg tablet Take 1 tablet (50 mg total) by mouth daily 90 tablet 5    mirtazapine (REMERON) 15 mg tablet Take 1 tablet (15 mg total) by mouth daily at bedtime Patient is also on a 30 mg tablet, for a total dose of 45 mg 30 tablet 3    mirtazapine (REMERON) 30 mg tablet Take 1 tablet (30 mg total) by mouth daily at bedtime 30 tablet 3    omeprazole (PriLOSEC) 20 mg delayed release capsule Take 1 capsule (20 mg total) by mouth daily 30 capsule 5    ondansetron (ZOFRAN) 8 mg tablet Take 1 tablet (8 mg total) by mouth every 8 (eight) hours as needed for nausea or vomiting 30 tablet 3    potassium chloride (Klor-Con M20) 20 mEq tablet Take 1 tablet (20 mEq total) by mouth 2 (two) times a day 60 tablet 1     prochlorperazine (COMPAZINE) 10 mg tablet Take 1 tablet (10 mg total) by mouth every 6 (six) hours as needed for nausea or vomiting 30 tablet 3    vitamin B-12 (VITAMIN B-12) 1,000 mcg tablet        No current facility-administered medications for this visit.     Social History:   reports that she has never smoked. She has never been exposed to tobacco smoke. She has never used smokeless tobacco. She reports that she does not drink alcohol and does not use drugs.     Family History:  family history includes Colon cancer (age of onset: 58) in her mother; Hypertension in her daughter, mother, and son; Pancreatic cancer (age of onset: 60) in her father.     Allergies:  has No Known Allergies.    Objective:  Vitals:    09/03/24 1043   BP: 124/82   Pulse: 93   Resp: 18   Temp: 98 °F (36.7 °C)   SpO2: 99%     Physical Exam:  General: Alert, and oriented; Pleasant, and conversational; Well-Appearing Female  HEENT: Atraumatic, and normocephalic; PERRLA; EOMI; Moist mucosal membranes  Neck: Trachea midline; Bilateral Neck Fullness from Cervical Lymphadenopathy  Cardiovascular: Regular rate and rhythm; No murmurs, rubs, or gallops; No edema  Respiratory: Clear to auscultation bilaterally; Adequate aeration; No supplemental oxygen   Abdomen: Soft, Non-tender; Non-distended; No organomegaly; Bowel sounds present  Extremities: No obvious deformities; No peripheral edema; Moves all  Neurologic: Appropriately alert, and oriented to Person, Place, and Time; No focal neurologic deficits    Labs:  Lab Results   Component Value Date    WBC 5.22 07/29/2024    HGB 9.2 (L) 07/29/2024    HCT 28.4 (L) 07/29/2024    MCV 89 07/29/2024     07/29/2024     Lab Results   Component Value Date    SODIUM 137 07/29/2024    K 3.4 (L) 07/29/2024     07/29/2024    CO2 26 07/29/2024    AGAP 4 07/29/2024    BUN 14 07/29/2024    CREATININE 0.75 07/29/2024    GLUC 107 07/29/2024    GLUF 101 (H) 06/03/2024    CALCIUM 9.1 07/29/2024    AST 45  (H) 07/29/2024    ALT 31 07/29/2024    ALKPHOS 142 (H) 07/29/2024    TP 7.2 07/29/2024    TBILI 0.29 07/29/2024    EGFR 82 07/29/2024     Patient was seen and discussed with attending physician, ALAINA Steve D.O.  Hematology-Oncology Fellow (PGY-5)

## 2024-09-09 ENCOUNTER — PATIENT OUTREACH (OUTPATIENT)
Dept: HEMATOLOGY ONCOLOGY | Facility: CLINIC | Age: 69
End: 2024-09-09

## 2024-09-09 NOTE — PROGRESS NOTES
Pt called stating she has an ENT appointment tomorrow 9/10 and needs transportation. I explained they are affiliated with [facility] but not in our [facility] network. I advised her to call the ENT office and ask if they participate with [facility] , and could get her set up for this appointment. She understood, and was thankful for the assistance

## 2024-09-13 ENCOUNTER — NUTRITION (OUTPATIENT)
Dept: NUTRITION | Facility: CLINIC | Age: 69
End: 2024-09-13

## 2024-09-13 ENCOUNTER — OFFICE VISIT (OUTPATIENT)
Dept: FAMILY MEDICINE CLINIC | Facility: CLINIC | Age: 69
End: 2024-09-13

## 2024-09-13 ENCOUNTER — APPOINTMENT (OUTPATIENT)
Dept: LAB | Facility: HOSPITAL | Age: 69
End: 2024-09-13
Payer: MEDICARE

## 2024-09-13 VITALS
WEIGHT: 195 LBS | OXYGEN SATURATION: 99 % | BODY MASS INDEX: 32.49 KG/M2 | DIASTOLIC BLOOD PRESSURE: 90 MMHG | TEMPERATURE: 98 F | HEART RATE: 94 BPM | SYSTOLIC BLOOD PRESSURE: 144 MMHG | RESPIRATION RATE: 16 BRPM | HEIGHT: 65 IN

## 2024-09-13 DIAGNOSIS — C09.9 RIGHT TONSILLAR SQUAMOUS CELL CARCINOMA (HCC): ICD-10-CM

## 2024-09-13 DIAGNOSIS — Z71.3 NUTRITIONAL COUNSELING: Primary | ICD-10-CM

## 2024-09-13 DIAGNOSIS — C09.9 TONSIL CANCER (HCC): ICD-10-CM

## 2024-09-13 DIAGNOSIS — I10 PRIMARY HYPERTENSION: Primary | ICD-10-CM

## 2024-09-13 LAB
ALBUMIN SERPL BCG-MCNC: 3.9 G/DL (ref 3.5–5)
ALP SERPL-CCNC: 120 U/L (ref 34–104)
ALT SERPL W P-5'-P-CCNC: 11 U/L (ref 7–52)
ANION GAP SERPL CALCULATED.3IONS-SCNC: 11 MMOL/L (ref 4–13)
AST SERPL W P-5'-P-CCNC: 22 U/L (ref 13–39)
BASOPHILS # BLD AUTO: 0.03 THOUSANDS/ΜL (ref 0–0.1)
BASOPHILS NFR BLD AUTO: 1 % (ref 0–1)
BILIRUB SERPL-MCNC: 0.27 MG/DL (ref 0.2–1)
BUN SERPL-MCNC: 17 MG/DL (ref 5–25)
CALCIUM SERPL-MCNC: 10.2 MG/DL (ref 8.4–10.2)
CHLORIDE SERPL-SCNC: 103 MMOL/L (ref 96–108)
CO2 SERPL-SCNC: 24 MMOL/L (ref 21–32)
CREAT SERPL-MCNC: 0.81 MG/DL (ref 0.6–1.3)
EOSINOPHIL # BLD AUTO: 0.25 THOUSAND/ΜL (ref 0–0.61)
EOSINOPHIL NFR BLD AUTO: 5 % (ref 0–6)
ERYTHROCYTE [DISTWIDTH] IN BLOOD BY AUTOMATED COUNT: 16.3 % (ref 11.6–15.1)
GFR SERPL CREATININE-BSD FRML MDRD: 74 ML/MIN/1.73SQ M
GLUCOSE P FAST SERPL-MCNC: 95 MG/DL (ref 65–99)
HCT VFR BLD AUTO: 33.4 % (ref 34.8–46.1)
HGB BLD-MCNC: 11.2 G/DL (ref 11.5–15.4)
IMM GRANULOCYTES # BLD AUTO: 0.01 THOUSAND/UL (ref 0–0.2)
IMM GRANULOCYTES NFR BLD AUTO: 0 % (ref 0–2)
LYMPHOCYTES # BLD AUTO: 1.76 THOUSANDS/ΜL (ref 0.6–4.47)
LYMPHOCYTES NFR BLD AUTO: 36 % (ref 14–44)
MAGNESIUM SERPL-MCNC: 1.8 MG/DL (ref 1.9–2.7)
MCH RBC QN AUTO: 28.9 PG (ref 26.8–34.3)
MCHC RBC AUTO-ENTMCNC: 33.5 G/DL (ref 31.4–37.4)
MCV RBC AUTO: 86 FL (ref 82–98)
MONOCYTES # BLD AUTO: 0.62 THOUSAND/ΜL (ref 0.17–1.22)
MONOCYTES NFR BLD AUTO: 13 % (ref 4–12)
NEUTROPHILS # BLD AUTO: 2.22 THOUSANDS/ΜL (ref 1.85–7.62)
NEUTS SEG NFR BLD AUTO: 45 % (ref 43–75)
NRBC BLD AUTO-RTO: 0 /100 WBCS
PLATELET # BLD AUTO: 232 THOUSANDS/UL (ref 149–390)
PMV BLD AUTO: 10.4 FL (ref 8.9–12.7)
POTASSIUM SERPL-SCNC: 3.6 MMOL/L (ref 3.5–5.3)
PROT SERPL-MCNC: 8.2 G/DL (ref 6.4–8.4)
RBC # BLD AUTO: 3.88 MILLION/UL (ref 3.81–5.12)
SODIUM SERPL-SCNC: 138 MMOL/L (ref 135–147)
WBC # BLD AUTO: 4.89 THOUSAND/UL (ref 4.31–10.16)

## 2024-09-13 PROCEDURE — 77300 RADIATION THERAPY DOSE PLAN: CPT | Performed by: RADIOLOGY

## 2024-09-13 PROCEDURE — 80053 COMPREHEN METABOLIC PANEL: CPT

## 2024-09-13 PROCEDURE — 77301 RADIOTHERAPY DOSE PLAN IMRT: CPT | Performed by: RADIOLOGY

## 2024-09-13 PROCEDURE — 83735 ASSAY OF MAGNESIUM: CPT

## 2024-09-13 PROCEDURE — 77338 DESIGN MLC DEVICE FOR IMRT: CPT | Performed by: RADIOLOGY

## 2024-09-13 PROCEDURE — 36415 COLL VENOUS BLD VENIPUNCTURE: CPT

## 2024-09-13 PROCEDURE — 85025 COMPLETE CBC W/AUTO DIFF WBC: CPT

## 2024-09-13 NOTE — PATIENT INSTRUCTIONS
Nutrition Rx & Recommendations:  Diet: High Calorie, High Protein (for high calorie foods see pages 52-53, and for high protein foods see pages 49-51 in your Eating Hints book)  Nutrition Supplements: continue Ensure complete shakes twice jennifer.  May use homemade shakes/smoothies as desired.  If using a pre-made shake/bar, choose ones with >300 calories and >10 grams protein (ex. Ensure Complete, Ensure Enlive, Ensure Plus, Boost Plus, Boost Very High Calorie, etc.).  Small, frequent meals/snacks may be easier to tolerate than 3 large daily meals.  Aim for 5-6 small meals per day.  Include protein at all meals/snacks.  Include a variety of foods (as tolerated/allowed by diet).  Stay hydrated by sipping fluids of choice/tolerance throughout the day.   Liquid nutrition may be better tolerated than solids at times.  Alter food choices and eating patterns to accommodate changing needs.  Refer to Eating Hints book for other meal/snack ideas and symptom management.  Avoid spicy, acidic, sharp/hard/crunchy foods & carbonated beverages as needed.  Follow proper oral care; Try baking soda/salt water rinse recipe (mix 3/4 tsp salt + 1 tsp baking soda + 1 qt water; rinse with plain water after using) in Eating Hints book (pg 18).  Brush your teeth before/after meals & before bed.  For dry mouth: sip water throughout the day; try very sweet drinks; chew gum or suck on hard candy, popsicles, & ice chips; eat easy to swallow foods (such as pureed cooked foods or soups); moisten food with sauce/gravy/salad dressing; do not drink beer, wine, or any type of alcohol; keep lips moist with lip balm; avoid tobacco products & second-hand smoke; try Biotene as needed (see pages 17-18 in your Eating Hints book).  Follow proper oral care; Try baking soda/salt water rinse recipe (mix 3/4 tsp salt + 1 tsp baking soda + 1 qt water; rinse with pain water after using) in Eating Hints book (pg 18).  Brush your teeth before/after meals & before  "bed.  Weigh yourself regularly. If you notice weight loss, make an effort to increase your daily food/calorie intake. If you continue to notice loss after these efforts, reach out to your dietitian to establish a plan to stabilize weight.  Consider trying \"Apple/Prune Sauce\" recipe on page 14 of your Eating Hints booklet.  Be sure to drink 8 oz of water after taking 1-2 tbsp of the mixture before bedtime.     Follow Up Plan: 9/23/24 after RT  Recommend Referral to Other Providers: none at this time  "

## 2024-09-13 NOTE — PROGRESS NOTES
Ambulatory Visit  Name: Maira Moura      : 1955      MRN: [id number]  Encounter Provider: Fanta Turner MD  Encounter Date: 2024   Encounter department: [facility]    Assessment & Plan  Primary hypertension  BP Readings from Last 3 Encounters:   24 144/90   24 124/82   24 114/75      Not within goal; stable and asymptomatic  Likely due to stress, missed med dose.  Current regimen: Losartan 50 mg daily  Unaware med had been refilled   Will be started on 7 weeks radiation with weekly Cisplatin for her colon, head and neck cancer.   Upcoming right tonsillectomy on 10/9/2024    Plan:  - Call to pharmacy to confirm med was refilled. Confirmed available for pickup  - Continue current dose  - Labs up to date  - Follow up in 6 months            History of Present Illness       Maira Moura is a 69 y.o. female with right tonsillar squamous cell carcinoma, metastatic colon cancer to the liver and  presenting today for review of chronic conditions.     Patient states she's doing well and her energy is slowly returning. She has a tonsillectomy scheduled next month as well as . Denies pain overall, dysphagia (except with bread), loss of appetite, nausea or vomiting. She does have some neuropathy of her fingers and toes. Patient reports that they are doing well/ have been compliant with medications.  Problems stable unless otherwise mentioned.  No acute complaints.       Review of Systems   Constitutional:  Positive for fatigue. Negative for chills and fever.   HENT:  Negative for ear pain and sore throat.    Respiratory:  Negative for cough and shortness of breath.    Cardiovascular:  Negative for chest pain and palpitations.   Gastrointestinal:  Negative for abdominal pain and vomiting.   Genitourinary:  Negative for dysuria and hematuria.   Skin:  Negative for color change and rash.   Neurological:  Negative for seizures and syncope.    All other systems reviewed and are negative.    Medical History Reviewed by provider this encounter:       Current Outpatient Medications on File Prior to Visit   Medication Sig Dispense Refill    docusate sodium (COLACE) 50 mg capsule Take 1 capsule (50 mg total) by mouth 2 (two) times a day 90 capsule 1    ergocalciferol (VITAMIN D2) 50,000 units Take 1 capsule (50,000 Units total) by mouth once a week 90 capsule 3    pyridoxine (VITAMIN B6) 50 mg tablet Take 1 tablet (50 mg total) by mouth daily 90 tablet 3    vitamin B-12 (VITAMIN B-12) 1,000 mcg tablet       anastrozole (ARIMIDEX) 1 mg tablet Take 1 tablet (1 mg total) by mouth daily 90 tablet 3    atorvastatin (LIPITOR) 40 mg tablet Take 1 tablet (40 mg total) by mouth daily at bedtime 30 tablet 2    ferrous sulfate 325 (65 Fe) mg tablet Take 1 tablet (325 mg total) by mouth daily with breakfast (Patient not taking: Reported on 7/12/2024) 30 tablet 0    folic acid (FOLVITE) 1 mg tablet Take 1 tablet (1 mg total) by mouth in the morning 90 tablet 3    gabapentin (NEURONTIN) 300 mg capsule Take 1 pills in the morning, 1 pill at lunch and 2 pills before bed 120 capsule 3    hydrocortisone 1 % ointment       lidocaine-prilocaine (EMLA) cream Apply topically as needed for mild pain 30 g 3    losartan (COZAAR) 50 mg tablet Take 1 tablet (50 mg total) by mouth daily 90 tablet 5    mirtazapine (REMERON) 15 mg tablet Take 1 tablet (15 mg total) by mouth daily at bedtime Patient is also on a 30 mg tablet, for a total dose of 45 mg 30 tablet 3    mirtazapine (REMERON) 30 mg tablet Take 1 tablet (30 mg total) by mouth daily at bedtime 30 tablet 3    omeprazole (PriLOSEC) 20 mg delayed release capsule Take 1 capsule (20 mg total) by mouth daily 30 capsule 5    ondansetron (ZOFRAN) 8 mg tablet Take 1 tablet (8 mg total) by mouth every 8 (eight) hours as needed for nausea or vomiting 90 tablet 2    potassium chloride (Klor-Con M20) 20 mEq tablet Take 1 tablet (20 mEq  "total) by mouth 2 (two) times a day 90 tablet 1    prochlorperazine (COMPAZINE) 10 mg tablet Take 1 tablet (10 mg total) by mouth every 6 (six) hours as needed for nausea or vomiting 90 tablet 2     No current facility-administered medications on file prior to visit.          Objective     /90 (BP Location: Left arm, Patient Position: Sitting, Cuff Size: Standard)   Pulse 94   Temp 98 °F (36.7 °C) (Temporal)   Resp 16   Ht 5' 5\" (1.651 m)   Wt 88.5 kg (195 lb)   SpO2 99%   BMI 32.45 kg/m²     Physical Exam  Vitals reviewed.   Constitutional:       General: She is not in acute distress.     Appearance: She is obese. She is not ill-appearing, toxic-appearing or diaphoretic.   HENT:      Head: Normocephalic.      Right Ear: External ear normal.      Left Ear: External ear normal.      Nose: Nose normal.      Mouth/Throat:      Mouth: Mucous membranes are dry.   Cardiovascular:      Rate and Rhythm: Normal rate and regular rhythm.      Pulses: Normal pulses.      Heart sounds: Normal heart sounds.   Pulmonary:      Effort: Pulmonary effort is normal.   Abdominal:      Palpations: Abdomen is soft.      Tenderness: There is no abdominal tenderness.   Musculoskeletal:      Right lower leg: No edema.      Left lower leg: No edema.   Lymphadenopathy:      Cervical: Cervical adenopathy present.   Skin:     General: Skin is warm.   Neurological:      Mental Status: She is alert and oriented to person, place, and time.   Psychiatric:         Behavior: Behavior normal.         "

## 2024-09-13 NOTE — PROGRESS NOTES
Outpatient Oncology Nutrition Consultation   Type of Consult: Initial Consult (was previously being followed by oncology RD, last seen 12/2023)  Care Location: Telephone Call    Reason for referral: Received notification by [facility] on 8/21/24 that pt has triggered for oncology nutrition care (reason for referral: HNC dx and Malnutrition Screening Tool (MST) Triggers: scored a 0 indicating No recent wt loss and is not eating poorly due to a decreased appetite. 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  - grade 2  - %  - [place] 95%  - HER2 negative     12/20/2018 Surgery    Right partial mastectomy and SLN biopsy:  Infiltrating ductal carcinoma, Grade 2/3, felt to be between 2.0 cm and 2.5 cm in greatest dimension  - No invasive tumor is seen at inked margins, but there is a focus of DCIS seen within approximately 1mm of the inked medial margin  - no LVI  - no LCIS  - 0/2 LN's  at least Stage IIA - pT2, pN0, cM0, G2.    - Dr Coronado     12/20/2018 -  Cancer Staged    Stage IIA - pT2, pN0, cM0, G2.       1/4/2019 Genomic Testing    Oncotype DX score: 13     2/1/2019 -  Hormone Therapy    anastrozole 1 mg daily as adjuvant endocrine therapy    - Dr Daley       2/14/2019 - 4/3/2019  "Radiation    Course: C1    Plan ID Energy Fractions Dose per Fraction (cGy) Dose Correction (cGy) Total Dose Delivered (cGy) Elapsed Days   R Breast 10X/6X 25 / 25 200 0 5,000 40   R BRST BOOST 16E 5 / 5 200 0 1,000 7      Treatment dates:  C1: 2/14/2019 - 4/3/2019       Metastatic colon cancer to liver (HCC)   7/6/2023 Genetic Testing    CARIS Results:   KRAS Pathogenic Variant Exon 2  p.G12D : lack of benefit of cetuximab, panitumumab Level 2   No other actionable mutation; MSI stable; TMB low   MiFOLOXAi Results: \"Decreased Benefit of FOLFOX + Bevacizumab in first line metastatic CRC.  This patient may achieve improved results by receiving an alternative to FOLFOX, such as FOLFIRI, as their initial regimen. As an adjustment to frontline FOLFOXIRI following toxicity: This patient may achieve improved results by removing the oxaliplatin portion of their regimen\".         7/11/2023 Initial Diagnosis    Metastatic colon cancer to liver (HCC)     7/13/2023 -  Cancer Staged    Staging form: Colon and Rectum, AJCC 8th Edition  - Clinical stage from 7/13/2023: cT3, cN0, pM1 - Signed by Harika Medina DO on 9/28/2023  Total positive nodes: 0       7/31/2023 - 8/1/2024 Chemotherapy    cyanocobalamin, 1,000 mcg, Intramuscular, Every 30 days, 7 of 9 cycles  Administration: 1,000 mcg (5/9/2024), 1,000 mcg (5/24/2024), 1,000 mcg (6/20/2024), 1,000 mcg (7/3/2024), 1,000 mcg (7/18/2024), 1,000 mcg (8/1/2024)  potassium chloride, 20 mEq, Intravenous, Once, 2 of 2 cycles  Administration: 20 mEq (11/6/2023), 20 mEq (11/20/2023)  alteplase (CATHFLO), 2 mg, Intracatheter, Every 1 Minute as needed, 21 of 23 cycles  Administration: 2 mg (1/3/2024)  pegfilgrastim (NEULASTA), 6 mg, Subcutaneous, Once, 12 of 12 cycles  Administration: 6 mg (10/25/2023), 6 mg (11/8/2023), 6 mg (11/22/2023), 6 mg (12/6/2023), 6 mg (12/20/2023), 6 mg (1/12/2024), 6 mg (1/25/2024), 6 mg (4/25/2024), 6 mg (5/9/2024), 6 mg (5/24/2024), 6 mg " (6/20/2024)  fluorouracil (ADRUCIL), 895 mg, Intravenous, Once, 21 of 23 cycles  Dose modification: 320 mg/m2 (original dose 400 mg/m2, Cycle 11)  Administration: 900 mg (7/31/2023), 900 mg (8/14/2023), 900 mg (8/28/2023), 900 mg (9/11/2023), 900 mg (9/25/2023), 900 mg (10/9/2023), 900 mg (10/23/2023), 895 mg (11/6/2023), 895 mg (11/20/2023), 895 mg (12/4/2023), 700 mg (12/18/2023), 680 mg (1/10/2024), 680 mg (1/23/2024), 625 mg (4/23/2024), 625 mg (5/7/2024), 625 mg (5/22/2024), 625 mg (6/4/2024), 625 mg (6/18/2024), 625 mg (7/1/2024), 625 mg (7/16/2024), 625 mg (7/30/2024)  nivolumab (OPDIVO) IVPB, 240 mg (100 % of original dose 240 mg), Intravenous, Once, 13 of 15 cycles  Dose modification: 240 mg (original dose 240 mg, Cycle 9)  Administration: 240 mg (11/20/2023), 240 mg (12/4/2023), 240 mg (12/18/2023), 240 mg (1/10/2024), 240 mg (1/23/2024), 240 mg (4/23/2024), 240 mg (5/7/2024), 240 mg (5/22/2024), 240 mg (6/4/2024), 240 mg (6/18/2024), 240 mg (7/1/2024), 240 mg (7/16/2024), 240 mg (7/30/2024)  leucovorin calcium IVPB, 896 mg, Intravenous, Once, 21 of 23 cycles  Administration: 900 mg (7/31/2023), 900 mg (8/14/2023), 900 mg (8/28/2023), 900 mg (9/11/2023), 900 mg (9/25/2023), 900 mg (10/9/2023), 900 mg (10/23/2023), 900 mg (11/6/2023), 900 mg (11/20/2023), 900 mg (12/4/2023), 900 mg (12/18/2023), 850 mg (1/10/2024), 850 mg (1/23/2024), 800 mg (4/23/2024), 800 mg (5/7/2024), 800 mg (5/22/2024), 800 mg (6/4/2024), 800 mg (6/18/2024), 800 mg (7/1/2024), 800 mg (7/16/2024), 800 mg (7/30/2024)  oxaliplatin (ELOXATIN) chemo infusion, 85 mg/m2 = 190.4 mg, Intravenous, Once, 12 of 12 cycles  Dose modification: 68 mg/m2 (original dose 85 mg/m2, Cycle 11, Reason: Other (Must fill in a comment), Comment: increased fatigue)  Administration: 190.4 mg (7/31/2023), 190.4 mg (8/14/2023), 200 mg (8/28/2023), 200 mg (9/11/2023), 200 mg (9/25/2023), 200 mg (10/9/2023), 200 mg (10/23/2023), 200 mg (11/6/2023), 200 mg  (11/20/2023), 190.4 mg (12/4/2023), 150 mg (12/18/2023), 150 mg (1/10/2024)  fluorouracil (ADRUCIL) ambulatory infusion Soln, 1,200 mg/m2/day = 5,375 mg, Intravenous, Over 46 hours, 21 of 23 cycles     9/26/2023 -  Cancer Staged    Staging form: Colon and Rectum, AJCC 8th Edition  - Pathologic: pT3, pN0, cM1 - Signed by Vickie Israel MD on 4/3/2024  Total positive nodes: 0       3/12/2024 Surgery    A. Terminal ileum, appendix, right colon (right hemicolectomy):  - Adenocarcinoma (5.2cm)  - Forty-nine (49) lymph nodes negative for carcinoma (0/49)    - Acellular mucin present in one (1) lymph node   - See staging synoptic (ypT3N0)     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HCC)   7/27/2023 Initial Diagnosis    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HCC)     7/27/2023 -  Cancer Staged    Staging form: Pharynx - Oropharynx, AJCC 8th Edition  - Clinical stage from 7/27/2023: Stage III (cT1, cN3, cM0, p16+) - Signed by Evan Plummer MD on 7/27/2023  Stage prefix: Initial diagnosis       9/17/2024 -  Chemotherapy    alteplase (CATHFLO), 2 mg, Intracatheter, Every 1 Minute as needed, 0 of 7 cycles  CISplatin (PLATINOL) infusion, 70 mg (original dose 40 mg/m2), Intravenous, Once, 0 of 7 cycles  Dose modification: 70 mg (original dose 40 mg/m2, Cycle 1, Reason: Anticipated Tolerance)  fosaprepitant (EMEND) IVPB, 150 mg, Intravenous, Once, 0 of 7 cycles       Past Medical & Surgical Hx:   Patient Active Problem List   Diagnosis    Primary hypertension    Mixed hyperlipidemia    Malignant neoplasm of right female breast (HCC)    Vitamin D deficiency    Prediabetes    Right thyroid nodule    GERD (gastroesophageal reflux disease)    Obesity    Metastatic colon cancer to liver (HCC)    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HCC)    Poor appetite    Nutritional anemia    Dehydration    Drug-induced constipation    Chemotherapy induced neutropenia (HCC)    Stage 3a chronic kidney disease (HCC)     Thrombocytopenia (HCC)    Neuropathy    Diastolic dysfunction    Hypokalemia    Leukemoid reaction    GOGO (acute kidney injury) (HCC)    Acute post-operative pain    At risk for constipation    At risk for delirium    Advance care planning    Physical deconditioning    History of CVA (cerebrovascular accident)    Chronic nasal congestion    Elevated LFTs    Vitamin B12 deficiency     Past Medical History:   Diagnosis Date    Anemia     Arthritis     Body mass index (BMI) 40.0-44.9, adult (HCC)     Breast cancer (HCC) 12/17/2018    Cancer (HCC)     right breast, colon, liver, right tonsil    Cervical lymphadenopathy     CT neck on 3/30/2023- Large right level 2A lymphadenopathy as described above and suspicious for neoplasm.  Correlation with histopathology is recommended.  Mild asymmetric prominence of the right palatine tonsil with otherwise no definitive nodular enhancing lesions identified along the course of the aerodigestive tract.     5/26/23- FNA of this node was nonrevealing for tissue etiology, but it d    Encounter for screening for HIV 07/07/2022    Follow-up examination 04/04/2023    GERD (gastroesophageal reflux disease)     Hyperlipidemia     Hypertension     Obesity     Stroke (HCC)     TIA     Stroke (HCC)     TIA     Transaminitis 09/22/2021    Vasomotor rhinitis     Refilled flonase today. Stopped taking since January 2022     Past Surgical History:   Procedure Laterality Date    BREAST BIOPSY Right 11/23/2018    us guided bx cancer    BREAST SURGERY      COLONOSCOPY      ESOPHAGOSCOPY N/A 07/20/2023    Procedure: ESOPHAGOSCOPY;  Surgeon: David Chapa MD;  [facility];  Service: ENT    HEMICOLOECTOMY W/ ANASTOMOSIS Right 3/12/2024    Procedure: RIGHT COLECTOMY WITH ROBOTICS;  Surgeon: Vickie Israel MD;  [facility];  Service: Surgical Oncology    IR BIOPSY LIVER MASS  06/27/2023    IR CRYOABLATION  6/3/2024    IR PORT CHECK  01/03/2024    IR PORT PLACEMENT  07/28/2023     IR PORT STRIPPING  01/09/2024    LAPAROTOMY N/A 3/12/2024    Procedure: LAPAROTOMY EXPLORATORY W/ ROBOTICS;  Surgeon: Vickie Israel MD;  [facility];  Service: Surgical Oncology    [facility] INCL FLUOR GDNCE DX W/CELL WASHG SPX N/A 07/20/2023    Procedure: BRONCHOSCOPY;  Surgeon: David Chapa MD;  [facility];  Service: ENT    AZ LARYNGOSCOPY W/BIOPSY MICROSCOPE/TELESCOPE N/A 07/20/2023    Procedure: MICRODIRECT LARYNGOSCOPY WITH BIOPSY;  Surgeon: David Chapa MD;  [facility]: [facility];  Service: ENT    AZ MASTECTOMY PARTIAL W/AXILLARY LYMPHADENECTOMY Right 12/20/2018    Procedure: ULTRASOUND LOCALIZED PARTIAL MASTECTOMY W/SENTINEL NODE BIOPSY POSS AXILLARY DISSECTION;  Surgeon: Zahida Coronado MD;  Location: [facility];  Service: General    TUBAL LIGATION      US GUIDED BREAST BIOPSY RIGHT COMPLETE Right 11/23/2018    US GUIDED INJECTION FOR RESEARCH STUDY  12/20/2018    US GUIDED INJECTION FOR RESEARCH STUDY  12/07/2018    US GUIDED INJECTION FOR RESEARCH STUDY  05/03/2019    US GUIDED LYMPH NODE BIOPSY RIGHT  05/26/2023       Review of Medications:   Vitamins, Supplements and Herbals: No, pt denies taking supplements    Current Outpatient Medications:     anastrozole (ARIMIDEX) 1 mg tablet, Take 1 tablet (1 mg total) by mouth daily, Disp: 90 tablet, Rfl: 3    atorvastatin (LIPITOR) 40 mg tablet, Take 1 tablet (40 mg total) by mouth daily at bedtime, Disp: 30 tablet, Rfl: 2    docusate sodium (COLACE) 50 mg capsule, Take 1 capsule (50 mg total) by mouth 2 (two) times a day, Disp: 90 capsule, Rfl: 1    ergocalciferol (VITAMIN D2) 50,000 units, Take 1 capsule (50,000 Units total) by mouth once a week, Disp: 90 capsule, Rfl: 3    ferrous sulfate 325 (65 Fe) mg tablet, Take 1 tablet (325 mg total) by mouth daily with breakfast (Patient not taking: Reported on 7/12/2024), Disp: 30 tablet, Rfl: 0    folic acid (FOLVITE) 1 mg tablet, Take 1 tablet (1 mg total) by mouth in the morning,  Disp: 90 tablet, Rfl: 3    gabapentin (NEURONTIN) 300 mg capsule, Take 1 pills in the morning, 1 pill at lunch and 2 pills before bed, Disp: 120 capsule, Rfl: 3    hydrocortisone 1 % ointment, , Disp: , Rfl:     lidocaine-prilocaine (EMLA) cream, Apply topically as needed for mild pain, Disp: 30 g, Rfl: 3    losartan (COZAAR) 50 mg tablet, Take 1 tablet (50 mg total) by mouth daily, Disp: 90 tablet, Rfl: 5    mirtazapine (REMERON) 15 mg tablet, Take 1 tablet (15 mg total) by mouth daily at bedtime Patient is also on a 30 mg tablet, for a total dose of 45 mg, Disp: 30 tablet, Rfl: 3    mirtazapine (REMERON) 30 mg tablet, Take 1 tablet (30 mg total) by mouth daily at bedtime, Disp: 30 tablet, Rfl: 3    omeprazole (PriLOSEC) 20 mg delayed release capsule, Take 1 capsule (20 mg total) by mouth daily, Disp: 30 capsule, Rfl: 5    ondansetron (ZOFRAN) 8 mg tablet, Take 1 tablet (8 mg total) by mouth every 8 (eight) hours as needed for nausea or vomiting, Disp: 90 tablet, Rfl: 2    potassium chloride (Klor-Con M20) 20 mEq tablet, Take 1 tablet (20 mEq total) by mouth 2 (two) times a day, Disp: 90 tablet, Rfl: 1    prochlorperazine (COMPAZINE) 10 mg tablet, Take 1 tablet (10 mg total) by mouth every 6 (six) hours as needed for nausea or vomiting, Disp: 90 tablet, Rfl: 2    pyridoxine (VITAMIN B6) 50 mg tablet, Take 1 tablet (50 mg total) by mouth daily, Disp: 90 tablet, Rfl: 3    vitamin B-12 (VITAMIN B-12) 1,000 mcg tablet, , Disp: , Rfl:     Most Recent Lab Results:   Lab Results   Component Value Date    WBC 5.22 07/29/2024    NEUTROABS 3.04 07/29/2024    IRON 55 05/07/2024    TIBC 292 05/07/2024    FERRITIN 145 05/07/2024    CHOLESTEROL 167 04/24/2024    TRIG 137 04/24/2024    HDL 43 (L) 04/24/2024    LDLCALC 97 04/24/2024    ALT 31 07/29/2024    AST 45 (H) 07/29/2024    ALB 3.2 (L) 07/29/2024    SODIUM 137 07/29/2024    SODIUM 139 07/15/2024    K 3.4 (L) 07/29/2024    K 3.8 07/15/2024     07/29/2024    BUN 14  "07/29/2024    BUN 13 07/15/2024    CREATININE 0.75 07/29/2024    CREATININE 0.79 07/15/2024    EGFR 82 07/29/2024    PHOS 1.7 (L) 03/15/2024    PHOS 2.4 03/14/2024    TSH 1.58 01/03/2022    GLUCOSE 209 (H) 03/12/2024    POCGLU 101 08/05/2024    GLUF 101 (H) 06/03/2024    GLUF 120 (H) 04/24/2024    GLUC 107 07/29/2024    HGBA1C 5.5 02/21/2024    HGBA1C 5.9 (H) 06/13/2023    HGBA1C 6.1 (H) 07/09/2022    CALCIUM 9.1 07/29/2024    FOLATE >20.0 (H) 05/23/2019    MG 2.1 03/15/2024       Anthropometric Measurements:   Height: 66\"  Ht Readings from Last 1 Encounters:   09/13/24 5' 5\" (1.651 m)     Wt Readings from Last 20 Encounters:   09/13/24 88.5 kg (195 lb)   09/10/24 89.8 kg (198 lb)   09/03/24 89.8 kg (198 lb)   08/20/24 92.2 kg (203 lb 3.2 oz)   08/16/24 90.3 kg (199 lb)   08/06/24 88.9 kg (196 lb)   07/30/24 87.8 kg (193 lb 9 oz)   07/16/24 89 kg (196 lb 3.4 oz)   07/12/24 87.7 kg (193 lb 6.4 oz)   07/03/24 87.5 kg (193 lb)   07/01/24 86.8 kg (191 lb 5.8 oz)   06/28/24 87.4 kg (192 lb 9.6 oz)   06/18/24 89.4 kg (197 lb 1.5 oz)   06/06/24 89.1 kg (196 lb 6.9 oz)   06/04/24 87 kg (191 lb 12.8 oz)   06/03/24 83.9 kg (185 lb)   05/22/24 86.6 kg (190 lb 14.7 oz)   05/21/24 85.5 kg (188 lb 6.4 oz)   05/07/24 88 kg (194 lb 0.1 oz)   05/06/24 88.4 kg (194 lb 12.8 oz)       Weight History:   Usual Weight: 275#  Varian: (2/22/19) 264.6#, (4/2/19) 263.4  Home Scale: n/a    Oncology Nutrition-Anthropometrics      Flowsheet Row Nutrition from 9/13/2024 in [facility] Nutrition from 12/4/2023 in [facility]   Patient [age] (years): 69 years 68 years   Patient (female) height (in): 66 in 66 in   Current Weight to be used for anthropometric calculations (lbs) 198 lbs 243.4 lbs   Current Weight to be used for anthropometric calculations (kg) 90 kg 110.6 kg   BMI: 32 39.3   IBW female: 130 lbs 130 lbs   IBW (kg) female: 59.1 kg 59.1 kg   IBW % (female) 152.3 " % 187.2 %   Adjusted BW (female): 147 lbs 158.4 lbs   Adjusted BW kg (female): 66.8 kg 72 kg   % weight change after 1 week: 0 % --   Weight change after 1 week (lbs) 0 lbs --   % weight change after 1 month: -0.5 % -1.1 %   Weight change after 1 month (lbs) -1 lbs -2.6 lbs   % weight change after 3 months: 0.5 % -5.9 %   Weight change after 3 months (lbs) 0.9 lbs -15.2 lbs            Nutrition-Focused Physical Findings: n/a due to telephone call    Food/Nutrition-Related History & Client/Social History:    Current Nutrition Impact Symptoms:  [] Nausea  [] Reduced Appetite  [] Acid Reflux    [] Vomiting  [] Unintended Wt Loss-from her UBW, but stable lately [] Malabsorption    [] Diarrhea  [] Unintended Wt Gain  [] Dumping Syndrome    [] Constipation  [] Thick Mucous/Secretions  [] Abdominal Pain    [] Dysgeusia (Altered Taste)  [x] Xerostomia (Dry Mouth)  [] Gas    [] Dysosmia (Altered Smell)  [] Thrush  [] Difficulty Chewing    [] Oral Mucositis (Sore Mouth)  [x] Fatigue  [] Hyperglycemia   Lab Results   Component Value Date    HGBA1C 5.5 02/21/2024      [] Odynophagia  [] Esophagitis  [] Other:    [] Dysphagia  [] Early Satiety  [] No Problems Eating      Food Allergies & Intolerances: no    Current Diet: Regular Diet, No Restrictions  Current Nutrition Intake: Unchanged from usual  Appetite: Good  Nutrition Route: PO  Oral Care: brushes daily  Activity level: ADLs    24 Hr Diet Recall:   Breakfast: cup of soup  Snack: Ensure complete  Lunch: grilled cheese, soda  Snack: Ensure complete  Dinner: cup of soup, egg and cheese sandwich, soda  Snack: chocolate cake    Beverages: water (16.9oz x 2), decaf coffee (8oz x3), soda- Dr. Pepper (12oz x3), Ensure (10oz x2)  Supplements:   Ensure Complete (10 oz, 350 kcal, 30 g pro) twice daily      Oncology Nutrition-Estimated Needs      Flowsheet Row Nutrition from 9/13/2024 in [facility] Nutrition from 8/17/2023 in [facility]   Weight type used Actual weight Adjusted weight   Weight in kilograms (kg) used for estimated needs 90 kg 74.1 kg   Energy needs formula:  30-35 kcal/kg 30-35 kcal/kg   Energy needs based on 30 kcal/k kcal 2223 kcal   Energy needs based on 35 kcal/k kcal 2593 kcal   Protein needs formula: 1.2-1.5 g/kg 1.2-1.5 g/kg   Protein needs based on 1.2 g/k g 89 g   Protein needs based on 1.5 g/kg 135 g 111 g   Fluid needs formula: 30-35 mL/kg 30-35 mL/kg   Fluid needs based on 30 mL/kg 2700 mL 2223 mL   Fluid needs in ounces 91 oz 75 oz   Fluid needs based on 35 mL/kg 3150 mL 2594 mL   Fluid needs in ounces 106 oz 88 oz             Discussion & Intervention:   Maira was evaluated today for an initial RD consultation regarding HNC.  Maira is planned to undergo tx for HNC .  She will be starting tx next week. She reports eating well at this time and maintaining her weight. She eats a regular diet and has no issues with swallowing. She does report some dry mouth. She is drinking Ensure complete shakes twice daily. PEG tube was discussed with her with Dr. Medina. There is no scheduled date for PEG placement at this time, but Maira is agreeable to PEG to help with her nutrition if she begins to have trouble eating. We discussed RD will make recommendations for enteral nutrition once we have a date for her feeding tube.    Reviewed 24 hour recall, which revealed an adequate po intake, and discussed ways to increase kcal, protein, and fluid intakes and optimize nutrient intake.  Also reviewed the importance of wt management throughout the tx process and the role of a high kcal/ high protein diet in managing wt and overall health.  Based on today's assessment, discussion included: MNT for:  xerostomia, potential nutrition impact symptom management, enteral nutrition, how to modify foods for anticipated nutrition impact symptoms pt may experience during CA tx, practicing  proper oral care, a high kcal/protein diet & food choices to include at all meals & snacks (Examples of high kcal foods: cheese, full-fat dairy products, nut butter, plant-based fats, coconut oil/milk, avocado, butter, cream soup, etc. Examples of high protein foods: eggs, chicken, fish, beans/legumes, nuts/nut butters, bone broth, etc.) , fortifying foods for added kcal and protein (examples include: adding cheese to foods such as eggs, mashed potatoes, casseroles, etc.; Making oatmeal with whole milk rather than water; Making fortified mashed potatoes with cream, butter, dry milk powder, plain Greek yogurt, and cheese.), adequate hydration & fluid choices, sipping on calorie containing beverages (examples include: adding whole milk or cream to coffee, oral nutrition supplements, juice, electrolyte replacement beverages, milk, etc.), eating smaller more frequent meals every 2-3 hours (5-6 small meals/day), eating when feeling most hungry, increasing oral nutrition supplements, and adding extra fat to foods while cooking such as butter, plant-based oil, coconut oil/milk, avocado, nut butters, etc..   Moving forward, Maira will continue current nutrition plan of care   Materials Provided: not applicable  All questions and concerns addressed during today’s visit.  Maira has RD contact information.    Nutrition Diagnosis:   Increased Nutrient Needs (kcal & pro) related to increased demand for nutrients and disease state as evidenced by cancer dx and pt undergoing tx for cancer.  Monitoring & Evaluation:   Goals:  weight maintenance/stabilization  adequate nutrition impact symptom management  pt to meet >/=75% estimated nutrition needs daily    Progress Towards Goals: Initiated    Nutrition Rx & Recommendations:  Diet: High Calorie, High Protein (for high calorie foods see pages 52-53, and for high protein foods see pages 49-51 in your Eating Hints book)  Nutrition Supplements: continue Ensure complete shakes  twice jennifer.  May use homemade shakes/smoothies as desired.  If using a pre-made shake/bar, choose ones with >300 calories and >10 grams protein (ex. Ensure Complete, Ensure Enlive, Ensure Plus, Boost Plus, Boost Very High Calorie, etc.).  Small, frequent meals/snacks may be easier to tolerate than 3 large daily meals.  Aim for 5-6 small meals per day.  Include protein at all meals/snacks.  Include a variety of foods (as tolerated/allowed by diet).  Stay hydrated by sipping fluids of choice/tolerance throughout the day.    Liquid nutrition may be better tolerated than solids at times.  Alter food choices and eating patterns to accommodate changing needs.  Refer to Eating Hints book for other meal/snack ideas and symptom management.  Avoid spicy, acidic, sharp/hard/crunchy foods & carbonated beverages as needed.  Follow proper oral care; Try baking soda/salt water rinse recipe (mix 3/4 tsp salt + 1 tsp baking soda + 1 qt water; rinse with plain water after using) in Eating Hints book (pg 18).  Brush your teeth before/after meals & before bed.  For dry mouth: sip water throughout the day; try very sweet drinks; chew gum or suck on hard candy, popsicles, & ice chips; eat easy to swallow foods (such as pureed cooked foods or soups); moisten food with sauce/gravy/salad dressing; do not drink beer, wine, or any type of alcohol; keep lips moist with lip balm; avoid tobacco products & second-hand smoke; try Biotene as needed (see pages 17-18 in your Eating Hints book).  Follow proper oral care; Try baking soda/salt water rinse recipe (mix 3/4 tsp salt + 1 tsp baking soda + 1 qt water; rinse with pain water after using) in Eating Hints book (pg 18).  Brush your teeth before/after meals & before bed.  Weigh yourself regularly. If you notice weight loss, make an effort to increase your daily food/calorie intake. If you continue to notice loss after these efforts, reach out to your dietitian to establish a plan to stabilize  "weight.  Consider trying \"Apple/Prune Sauce\" recipe on page 14 of your Eating Hints booklet.  Be sure to drink 8 oz of water after taking 1-2 tbsp of the mixture before bedtime.     Follow Up Plan:  9/23/24 after RT  Recommend Referral to Other Providers: none at this time  "

## 2024-09-14 NOTE — ASSESSMENT & PLAN NOTE
BP Readings from Last 3 Encounters:   09/13/24 144/90   09/03/24 124/82   08/20/24 114/75      Not within goal; stable and asymptomatic  Likely due to stress, missed med dose.  Current regimen: Losartan 50 mg daily  Unaware med had been refilled   Will be started on 7 weeks radiation with weekly Cisplatin for her colon, head and neck cancer.   Upcoming right tonsillectomy on 10/9/2024    Plan:  - Call to pharmacy to confirm med was refilled. Confirmed available for pickup  - Continue current dose  - Labs up to date  - Follow up in 6 months

## 2024-09-16 ENCOUNTER — APPOINTMENT (OUTPATIENT)
Dept: RADIATION ONCOLOGY | Facility: CLINIC | Age: 69
End: 2024-09-16
Attending: RADIOLOGY
Payer: MEDICARE

## 2024-09-16 PROCEDURE — 77014 CHG CT GUIDANCE RADIATION THERAPY FLDS PLACEMENT: CPT | Performed by: RADIOLOGY

## 2024-09-16 PROCEDURE — 77386 HB NTSTY MODUL RAD TX DLVR CPLX: CPT | Performed by: RADIOLOGY

## 2024-09-16 PROCEDURE — 77427 RADIATION TX MANAGEMENT X5: CPT | Performed by: RADIOLOGY

## 2024-09-16 RX ORDER — MAGNESIUM SULFATE HEPTAHYDRATE 40 MG/ML
2 INJECTION, SOLUTION INTRAVENOUS ONCE
Status: CANCELLED
Start: 2024-09-17

## 2024-09-17 ENCOUNTER — HOSPITAL ENCOUNTER (OUTPATIENT)
Dept: INFUSION CENTER | Facility: CLINIC | Age: 69
Discharge: HOME | End: 2024-09-17
Payer: MEDICARE

## 2024-09-17 ENCOUNTER — APPOINTMENT (OUTPATIENT)
Dept: RADIATION ONCOLOGY | Facility: CLINIC | Age: 69
End: 2024-09-17
Attending: RADIOLOGY
Payer: MEDICARE

## 2024-09-17 VITALS
HEART RATE: 65 BPM | WEIGHT: 198.41 LBS | RESPIRATION RATE: 20 BRPM | TEMPERATURE: 96.9 F | DIASTOLIC BLOOD PRESSURE: 81 MMHG | HEIGHT: 66 IN | BODY MASS INDEX: 31.89 KG/M2 | SYSTOLIC BLOOD PRESSURE: 128 MMHG

## 2024-09-17 DIAGNOSIS — C09.9 RIGHT TONSILLAR SQUAMOUS CELL CARCINOMA (HCC): Primary | ICD-10-CM

## 2024-09-17 PROCEDURE — 77014 CHG CT GUIDANCE RADIATION THERAPY FLDS PLACEMENT: CPT | Performed by: RADIOLOGY

## 2024-09-17 PROCEDURE — 96367 TX/PROPH/DG ADDL SEQ IV INF: CPT

## 2024-09-17 PROCEDURE — 77386 HB NTSTY MODUL RAD TX DLVR CPLX: CPT | Performed by: RADIOLOGY

## 2024-09-17 PROCEDURE — 96413 CHEMO IV INFUSION 1 HR: CPT

## 2024-09-17 RX ORDER — MAGNESIUM SULFATE HEPTAHYDRATE 40 MG/ML
2 INJECTION, SOLUTION INTRAVENOUS ONCE
Status: COMPLETED | OUTPATIENT
Start: 2024-09-17 | End: 2024-09-17

## 2024-09-17 RX ORDER — SODIUM CHLORIDE 9 MG/ML
20 INJECTION, SOLUTION INTRAVENOUS ONCE
Status: COMPLETED | OUTPATIENT
Start: 2024-09-17 | End: 2024-09-17

## 2024-09-17 RX ADMIN — SODIUM CHLORIDE 20 ML/HR: 0.9 INJECTION, SOLUTION INTRAVENOUS at 09:29

## 2024-09-17 RX ADMIN — FOSAPREPITANT 150 MG: 150 INJECTION, POWDER, LYOPHILIZED, FOR SOLUTION INTRAVENOUS at 10:03

## 2024-09-17 RX ADMIN — CISPLATIN 70 MG: 1 INJECTION, SOLUTION INTRAVENOUS at 10:47

## 2024-09-17 RX ADMIN — SODIUM CHLORIDE 500 ML: 0.9 INJECTION, SOLUTION INTRAVENOUS at 11:48

## 2024-09-17 RX ADMIN — MAGNESIUM SULFATE HEPTAHYDRATE 2 G: 40 INJECTION, SOLUTION INTRAVENOUS at 12:01

## 2024-09-17 RX ADMIN — DEXAMETHASONE SODIUM PHOSPHATE: 10 INJECTION, SOLUTION INTRAMUSCULAR; INTRAVENOUS at 09:33

## 2024-09-17 RX ADMIN — SODIUM CHLORIDE 500 ML: 0.9 INJECTION, SOLUTION INTRAVENOUS at 09:29

## 2024-09-17 NOTE — PROGRESS NOTES
Pt tolerated day 1 cycle 1 of chemo without incident. Pt provided with AVS. Pt aware to come to infusion center after radiation on Friday's for central labs and then treatment on Monday's. Pt educated about side effects and encourage her to notify Dr Medina of any severe side effects. Questioned pt about PEG tube, states they did call her about it but she needs to call them back and schedule. Encouraged pt to do that soon as it may take some time for them to get her on the schedule and since she is starting treatment today she doesn't want to wait too long. Pt verbalized understanding.

## 2024-09-18 ENCOUNTER — APPOINTMENT (OUTPATIENT)
Dept: RADIATION ONCOLOGY | Facility: CLINIC | Age: 69
End: 2024-09-18
Attending: RADIOLOGY
Payer: MEDICARE

## 2024-09-18 PROCEDURE — 77386 HB NTSTY MODUL RAD TX DLVR CPLX: CPT | Performed by: RADIOLOGY

## 2024-09-18 PROCEDURE — 77014 CHG CT GUIDANCE RADIATION THERAPY FLDS PLACEMENT: CPT | Performed by: RADIOLOGY

## 2024-09-19 ENCOUNTER — APPOINTMENT (OUTPATIENT)
Dept: RADIATION ONCOLOGY | Facility: CLINIC | Age: 69
End: 2024-09-19
Attending: RADIOLOGY
Payer: MEDICARE

## 2024-09-19 DIAGNOSIS — C09.9 RIGHT TONSILLAR SQUAMOUS CELL CARCINOMA (HCC): Primary | ICD-10-CM

## 2024-09-19 PROCEDURE — 77386 HB NTSTY MODUL RAD TX DLVR CPLX: CPT | Performed by: RADIOLOGY

## 2024-09-19 PROCEDURE — 77014 CHG CT GUIDANCE RADIATION THERAPY FLDS PLACEMENT: CPT | Performed by: RADIOLOGY

## 2024-09-20 ENCOUNTER — HOSPITAL ENCOUNTER (OUTPATIENT)
Dept: INFUSION CENTER | Facility: CLINIC | Age: 69
End: 2024-09-20
Payer: MEDICARE

## 2024-09-20 ENCOUNTER — PATIENT OUTREACH (OUTPATIENT)
Dept: HEMATOLOGY ONCOLOGY | Facility: CLINIC | Age: 69
End: 2024-09-20

## 2024-09-20 ENCOUNTER — APPOINTMENT (OUTPATIENT)
Dept: RADIATION ONCOLOGY | Facility: CLINIC | Age: 69
End: 2024-09-20
Attending: RADIOLOGY
Payer: MEDICARE

## 2024-09-20 DIAGNOSIS — C18.9 METASTATIC COLON CANCER TO LIVER (HCC): Primary | ICD-10-CM

## 2024-09-20 DIAGNOSIS — C78.7 METASTATIC COLON CANCER TO LIVER (HCC): Primary | ICD-10-CM

## 2024-09-20 DIAGNOSIS — C09.9 RIGHT TONSILLAR SQUAMOUS CELL CARCINOMA (HCC): ICD-10-CM

## 2024-09-20 LAB
ALBUMIN SERPL BCG-MCNC: 3.3 G/DL (ref 3.5–5)
ALP SERPL-CCNC: 85 U/L (ref 34–104)
ALT SERPL W P-5'-P-CCNC: 13 U/L (ref 7–52)
ANION GAP SERPL CALCULATED.3IONS-SCNC: 5 MMOL/L (ref 4–13)
AST SERPL W P-5'-P-CCNC: 22 U/L (ref 13–39)
BASOPHILS # BLD AUTO: 0.03 THOUSANDS/ΜL (ref 0–0.1)
BASOPHILS NFR BLD AUTO: 1 % (ref 0–1)
BILIRUB SERPL-MCNC: 0.4 MG/DL (ref 0.2–1)
BUN SERPL-MCNC: 16 MG/DL (ref 5–25)
CALCIUM ALBUM COR SERPL-MCNC: 9.5 MG/DL (ref 8.3–10.1)
CALCIUM SERPL-MCNC: 8.9 MG/DL (ref 8.4–10.2)
CHLORIDE SERPL-SCNC: 107 MMOL/L (ref 96–108)
CO2 SERPL-SCNC: 26 MMOL/L (ref 21–32)
CREAT SERPL-MCNC: 0.75 MG/DL (ref 0.6–1.3)
EOSINOPHIL # BLD AUTO: 0.1 THOUSAND/ΜL (ref 0–0.61)
EOSINOPHIL NFR BLD AUTO: 2 % (ref 0–6)
ERYTHROCYTE [DISTWIDTH] IN BLOOD BY AUTOMATED COUNT: 16.7 % (ref 11.6–15.1)
GFR SERPL CREATININE-BSD FRML MDRD: 82 ML/MIN/1.73SQ M
GLUCOSE SERPL-MCNC: 87 MG/DL (ref 65–140)
HCT VFR BLD AUTO: 30.5 % (ref 34.8–46.1)
HGB BLD-MCNC: 9.8 G/DL (ref 11.5–15.4)
IMM GRANULOCYTES # BLD AUTO: 0.01 THOUSAND/UL (ref 0–0.2)
IMM GRANULOCYTES NFR BLD AUTO: 0 % (ref 0–2)
LYMPHOCYTES # BLD AUTO: 1.59 THOUSANDS/ΜL (ref 0.6–4.47)
LYMPHOCYTES NFR BLD AUTO: 28 % (ref 14–44)
MAGNESIUM SERPL-MCNC: 1.7 MG/DL (ref 1.9–2.7)
MCH RBC QN AUTO: 28.4 PG (ref 26.8–34.3)
MCHC RBC AUTO-ENTMCNC: 32.1 G/DL (ref 31.4–37.4)
MCV RBC AUTO: 88 FL (ref 82–98)
MONOCYTES # BLD AUTO: 0.52 THOUSAND/ΜL (ref 0.17–1.22)
MONOCYTES NFR BLD AUTO: 9 % (ref 4–12)
NEUTROPHILS # BLD AUTO: 3.4 THOUSANDS/ΜL (ref 1.85–7.62)
NEUTS SEG NFR BLD AUTO: 60 % (ref 43–75)
NRBC BLD AUTO-RTO: 0 /100 WBCS
PLATELET # BLD AUTO: 232 THOUSANDS/UL (ref 149–390)
PMV BLD AUTO: 10.6 FL (ref 8.9–12.7)
POTASSIUM SERPL-SCNC: 3.8 MMOL/L (ref 3.5–5.3)
PROT SERPL-MCNC: 6.5 G/DL (ref 6.4–8.4)
RBC # BLD AUTO: 3.45 MILLION/UL (ref 3.81–5.12)
SODIUM SERPL-SCNC: 138 MMOL/L (ref 135–147)
WBC # BLD AUTO: 5.65 THOUSAND/UL (ref 4.31–10.16)

## 2024-09-20 PROCEDURE — 77336 RADIATION PHYSICS CONSULT: CPT | Performed by: RADIOLOGY

## 2024-09-20 PROCEDURE — 85025 COMPLETE CBC W/AUTO DIFF WBC: CPT | Performed by: INTERNAL MEDICINE

## 2024-09-20 PROCEDURE — 80053 COMPREHEN METABOLIC PANEL: CPT | Performed by: INTERNAL MEDICINE

## 2024-09-20 PROCEDURE — 77014 CHG CT GUIDANCE RADIATION THERAPY FLDS PLACEMENT: CPT | Performed by: RADIOLOGY

## 2024-09-20 PROCEDURE — 77386 HB NTSTY MODUL RAD TX DLVR CPLX: CPT | Performed by: RADIOLOGY

## 2024-09-20 PROCEDURE — 83735 ASSAY OF MAGNESIUM: CPT | Performed by: INTERNAL MEDICINE

## 2024-09-20 NOTE — PROGRESS NOTES
Patient presents for port flush and labs. Port accessed, labs drawn, and deaccessed per protocol without incident. Patient declines AVS. Aware of next appointment on 9/23 @ 10am.

## 2024-09-20 NOTE — PROGRESS NOTES
Pt called to confirm she is on the schedule for transportation today to her infusion appointment, she was concerned because she did not receive a phone call form  [facility].  I confirmed she is on the scheduled per round trip. She understood, and was thankful for the assistance

## 2024-09-23 ENCOUNTER — HOSPITAL ENCOUNTER (OUTPATIENT)
Dept: INFUSION CENTER | Facility: CLINIC | Age: 69
Discharge: HOME | End: 2024-09-23
Payer: MEDICARE

## 2024-09-23 ENCOUNTER — APPOINTMENT (OUTPATIENT)
Dept: RADIATION ONCOLOGY | Facility: CLINIC | Age: 69
End: 2024-09-23
Attending: RADIOLOGY
Payer: MEDICARE

## 2024-09-23 ENCOUNTER — PATIENT OUTREACH (OUTPATIENT)
Dept: HEMATOLOGY ONCOLOGY | Facility: CLINIC | Age: 69
End: 2024-09-23

## 2024-09-23 ENCOUNTER — NUTRITION (OUTPATIENT)
Dept: NUTRITION | Facility: CLINIC | Age: 69
End: 2024-09-23

## 2024-09-23 VITALS
TEMPERATURE: 96.8 F | HEIGHT: 66 IN | RESPIRATION RATE: 18 BRPM | BODY MASS INDEX: 31.25 KG/M2 | SYSTOLIC BLOOD PRESSURE: 105 MMHG | HEART RATE: 69 BPM | WEIGHT: 194.45 LBS | DIASTOLIC BLOOD PRESSURE: 71 MMHG

## 2024-09-23 DIAGNOSIS — Z71.3 NUTRITIONAL COUNSELING: Primary | ICD-10-CM

## 2024-09-23 DIAGNOSIS — C09.9 RIGHT TONSILLAR SQUAMOUS CELL CARCINOMA (HCC): Primary | ICD-10-CM

## 2024-09-23 PROCEDURE — 77386 HB NTSTY MODUL RAD TX DLVR CPLX: CPT | Performed by: RADIOLOGY

## 2024-09-23 PROCEDURE — 77014 CHG CT GUIDANCE RADIATION THERAPY FLDS PLACEMENT: CPT | Performed by: RADIOLOGY

## 2024-09-23 PROCEDURE — 96367 TX/PROPH/DG ADDL SEQ IV INF: CPT

## 2024-09-23 PROCEDURE — 96413 CHEMO IV INFUSION 1 HR: CPT

## 2024-09-23 RX ORDER — SODIUM CHLORIDE 9 MG/ML
20 INJECTION, SOLUTION INTRAVENOUS ONCE
Status: COMPLETED | OUTPATIENT
Start: 2024-09-23 | End: 2024-09-23

## 2024-09-23 RX ORDER — MAGNESIUM SULFATE HEPTAHYDRATE 40 MG/ML
2 INJECTION, SOLUTION INTRAVENOUS ONCE
Status: CANCELLED
Start: 2024-09-23 | End: 2024-09-23

## 2024-09-23 RX ORDER — MAGNESIUM SULFATE HEPTAHYDRATE 40 MG/ML
2 INJECTION, SOLUTION INTRAVENOUS ONCE
Status: COMPLETED | OUTPATIENT
Start: 2024-09-23 | End: 2024-09-23

## 2024-09-23 RX ADMIN — CISPLATIN 70 MG: 1 INJECTION, SOLUTION INTRAVENOUS at 13:40

## 2024-09-23 RX ADMIN — SODIUM CHLORIDE 500 ML: 0.9 INJECTION, SOLUTION INTRAVENOUS at 12:40

## 2024-09-23 RX ADMIN — DEXAMETHASONE SODIUM PHOSPHATE: 10 INJECTION, SOLUTION INTRAMUSCULAR; INTRAVENOUS at 12:39

## 2024-09-23 RX ADMIN — MAGNESIUM SULFATE HEPTAHYDRATE 2 G: 40 INJECTION, SOLUTION INTRAVENOUS at 14:43

## 2024-09-23 RX ADMIN — SODIUM CHLORIDE 20 ML/HR: 0.9 INJECTION, SOLUTION INTRAVENOUS at 12:39

## 2024-09-23 RX ADMIN — SODIUM CHLORIDE 500 ML: 0.9 INJECTION, SOLUTION INTRAVENOUS at 14:41

## 2024-09-23 RX ADMIN — FOSAPREPITANT 150 MG: 150 INJECTION, POWDER, LYOPHILIZED, FOR SOLUTION INTRAVENOUS at 13:10

## 2024-09-23 NOTE — PROGRESS NOTES
Patient arrived to unit without incident. Patient tolerated chemotherapy and magnesium infusion without incident. AVS declined and patient aware of next appointment on 9/27/24 at 11:00. Patient left in stable condition.

## 2024-09-23 NOTE — PATIENT INSTRUCTIONS
Nutrition Rx & Recommendations:  Diet: High Calorie, High Protein (for high calorie foods see pages 52-53, and for high protein foods see pages 49-51 in your Eating Hints book)  Nutrition Supplements: Ensure complete shakes twice daily.  May use homemade shakes/smoothies as desired.  If using a pre-made shake/bar, choose ones with >300 calories and >10 grams protein (ex. Ensure Complete, Ensure Enlive, Ensure Plus, Boost Plus, Boost Very High Calorie, etc.).  Small, frequent meals/snacks may be easier to tolerate than 3 large daily meals.  Aim for 5-6 small meals per day.  Include protein at all meals/snacks.  Include a variety of foods (as tolerated/allowed by diet).  Stay hydrated by sipping fluids of choice/tolerance throughout the day.    Liquid nutrition may be better tolerated than solids at times.  Alter food choices and eating patterns to accommodate changing needs.  Refer to Eating Hints book for other meal/snack ideas and symptom management.  Avoid spicy, acidic, sharp/hard/crunchy foods & carbonated beverages as needed.  Follow proper oral care; Try baking soda/salt water rinse recipe (mix 3/4 tsp salt + 1 tsp baking soda + 1 qt water; rinse with plain water after using) in Eating Hints book (pg 18).  Brush your teeth before/after meals & before bed.  For trouble swallowing: eat 5-6 small meals/day; choose foods that are easy to swallow; choose foods that are high in protein & calories; cook foods until they are soft/tender; cut food into small pieces; moisten & soften foods (gravy/sauce/broth/yogurt); sip drinks through a straw (as tolerated); avoid foods/drinks that can burn/scrape your throat (hot temperatures, spicy foods, acidic foods, sharp/hard/crunchy foods, alcohol); talk to your doctor about a Speech Therapy referral for a swallowing evaluation (see page 26-28 in your Eating Hints book).  For dry mouth: sip water throughout the day; try very sweet drinks; chew gum or suck on hard candy,  popsicles, & ice chips; eat easy to swallow foods (such as pureed cooked foods or soups); moisten food with sauce/gravy/salad dressing; do not drink beer, wine, or any type of alcohol; keep lips moist with lip balm; avoid tobacco products & second-hand smoke; try Biotene as needed (see pages 17-18 in your Eating Hints book).  Follow proper oral care; Try baking soda/salt water rinse recipe (mix 3/4 tsp salt + 1 tsp baking soda + 1 qt water; rinse with pain water after using) in Eating Hints book (pg 18).  Brush your teeth before/after meals & before bed.  Weigh yourself regularly. If you notice weight loss, make an effort to increase your daily food/calorie intake. If you continue to notice loss after these efforts, reach out to your dietitian to establish a plan to stabilize weight.   Soft/easy to swallow foods that are high in calories and protein: casseroles, chicken/egg/tuna salad with extra garcia to add calories and moisture, oatmeal/cream of wheat made with whole milk, cottage cheese, whole milk Greek yogurt, scrambled eggs with cheese, avocado, macaroni and cheese, mashed potatoes made with butter and whole milk or dry milk powder, ground meat with extra sauce/gravy, canned fruit, peanut butter, cream soups (cream of chicken, cream of mushroom, broccoli cheddar), pudding made with whole milk, custard, ice cream, banana, milkshakes/smoothies, Review page 47 of Eating Hints Book for more ideas.   Switch to whole milk for more calories  TF plan once PEG is placed:  Recommend starting with 1-2 cartons of jevity 1.5 daily  If PO intake decreases or weight drops, will need to work on increasing to goal amount of:  TF Goal: 1 container (237 mL) 5 times per day + 1.5 containers (355 mL) once/day of Jevity 1.5 via PEG (push syringe or gravity syringe method to administer boluses; 1 container to infuse over ~15-20 minutes). (Total of 6.5 cartons/day)  Water Flushin mL free water flush pre/post each bolus (720 mL  water)   Enteral Nutrition goal to provide: 1540 mL volume (6.5 containers/day), 2310 kcal, 98 grams protein, 1171 mL free water, 1891 mL total water daily.  Allow for substitutions  Your syringes are each 60 mL or 2 fl oz.  Always flush your feeding tube with 60 mL room-temp water 1-2 times daily while feeding tube is not in use.  Call [facility] when you have 10 days left of TF supplies: [phone], option 3 (customer support).      Follow Up Plan: 9/30/24 during infusion  Recommend Referral to Other Providers: none at this time

## 2024-09-23 NOTE — PROGRESS NOTES
Pt called stating she has a 10am appointment, and  [facility]  has not called or showed up. Transportation for today was not booked via round trip.  I booked her a LYFT , and notified the infusion center that she will be late.  I instructed her to call me back if [facility]  does not show. She understood, and was thankful for the assistance

## 2024-09-23 NOTE — PROGRESS NOTES
Outpatient Oncology Nutrition Consultation   Type of Consult: Follow Up   Care Location: [facility]    Reason for referral: Received notification by [facility] ZAPATA on 8/21/24 that pt has triggered for oncology nutrition care (reason for referral: HNC dx and Malnutrition Screening Tool (MST) Triggers: scored a 0 indicating No recent wt loss and is not eating poorly due to a decreased appetite. 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  - grade 2  - %  - TX 95%  - HER2 negative     12/20/2018 Surgery    Right partial mastectomy and SLN biopsy:  Infiltrating ductal carcinoma, Grade 2/3, felt to be between 2.0 cm and 2.5 cm in greatest dimension  - No invasive tumor is seen at inked margins, but there is a focus of DCIS seen within approximately 1mm of the inked medial margin  - no LVI  - no LCIS  - 0/2 LN's  at least Stage IIA - pT2, pN0, cM0, G2.    - Dr Coronado     12/20/2018 -  Cancer Staged    Stage IIA - pT2, pN0, cM0, G2.       1/4/2019 Genomic Testing    Oncotype DX score: 13     2/1/2019 -  Hormone Therapy    anastrozole 1 mg daily as adjuvant endocrine therapy    - Dr Daley       2/14/2019 - 4/3/2019 Radiation    Course: C1    Plan ID Energy  "Fractions Dose per Fraction (cGy) Dose Correction (cGy) Total Dose Delivered (cGy) Elapsed Days   R Breast 10X/6X 25 / 25 200 0 5,000 40   R BRST BOOST 16E 5 / 5 200 0 1,000 7      Treatment dates:  C1: 2/14/2019 - 4/3/2019       Metastatic colon cancer to liver (HCC)   7/6/2023 Genetic Testing    CARIS Results:   KRAS Pathogenic Variant Exon 2  p.G12D : lack of benefit of cetuximab, panitumumab Level 2   No other actionable mutation; MSI stable; TMB low   MiFOLOXAi Results: \"Decreased Benefit of FOLFOX + Bevacizumab in first line metastatic CRC.  This patient may achieve improved results by receiving an alternative to FOLFOX, such as FOLFIRI, as their initial regimen. As an adjustment to frontline FOLFOXIRI following toxicity: This patient may achieve improved results by removing the oxaliplatin portion of their regimen\".         7/11/2023 Initial Diagnosis    Metastatic colon cancer to liver (HCC)     7/13/2023 -  Cancer Staged    Staging form: Colon and Rectum, AJCC 8th Edition  - Clinical stage from 7/13/2023: cT3, cN0, pM1 - Signed by Harika Medina DO on 9/28/2023  Total positive nodes: 0       7/31/2023 - 8/1/2024 Chemotherapy    cyanocobalamin, 1,000 mcg, Intramuscular, Every 30 days, 7 of 9 cycles  Administration: 1,000 mcg (5/9/2024), 1,000 mcg (5/24/2024), 1,000 mcg (6/20/2024), 1,000 mcg (7/3/2024), 1,000 mcg (7/18/2024), 1,000 mcg (8/1/2024)  potassium chloride, 20 mEq, Intravenous, Once, 2 of 2 cycles  Administration: 20 mEq (11/6/2023), 20 mEq (11/20/2023)  alteplase (CATHFLO), 2 mg, Intracatheter, Every 1 Minute as needed, 21 of 23 cycles  Administration: 2 mg (1/3/2024)  pegfilgrastim (NEULASTA), 6 mg, Subcutaneous, Once, 12 of 12 cycles  Administration: 6 mg (10/25/2023), 6 mg (11/8/2023), 6 mg (11/22/2023), 6 mg (12/6/2023), 6 mg (12/20/2023), 6 mg (1/12/2024), 6 mg (1/25/2024), 6 mg (4/25/2024), 6 mg (5/9/2024), 6 mg (5/24/2024), 6 mg (6/20/2024)  fluorouracil (ADRUCIL), 895 mg, Intravenous, " Once, 21 of 23 cycles  Dose modification: 320 mg/m2 (original dose 400 mg/m2, Cycle 11)  Administration: 900 mg (7/31/2023), 900 mg (8/14/2023), 900 mg (8/28/2023), 900 mg (9/11/2023), 900 mg (9/25/2023), 900 mg (10/9/2023), 900 mg (10/23/2023), 895 mg (11/6/2023), 895 mg (11/20/2023), 895 mg (12/4/2023), 700 mg (12/18/2023), 680 mg (1/10/2024), 680 mg (1/23/2024), 625 mg (4/23/2024), 625 mg (5/7/2024), 625 mg (5/22/2024), 625 mg (6/4/2024), 625 mg (6/18/2024), 625 mg (7/1/2024), 625 mg (7/16/2024), 625 mg (7/30/2024)  nivolumab (OPDIVO) IVPB, 240 mg (100 % of original dose 240 mg), Intravenous, Once, 13 of 15 cycles  Dose modification: 240 mg (original dose 240 mg, Cycle 9)  Administration: 240 mg (11/20/2023), 240 mg (12/4/2023), 240 mg (12/18/2023), 240 mg (1/10/2024), 240 mg (1/23/2024), 240 mg (4/23/2024), 240 mg (5/7/2024), 240 mg (5/22/2024), 240 mg (6/4/2024), 240 mg (6/18/2024), 240 mg (7/1/2024), 240 mg (7/16/2024), 240 mg (7/30/2024)  leucovorin calcium IVPB, 896 mg, Intravenous, Once, 21 of 23 cycles  Administration: 900 mg (7/31/2023), 900 mg (8/14/2023), 900 mg (8/28/2023), 900 mg (9/11/2023), 900 mg (9/25/2023), 900 mg (10/9/2023), 900 mg (10/23/2023), 900 mg (11/6/2023), 900 mg (11/20/2023), 900 mg (12/4/2023), 900 mg (12/18/2023), 850 mg (1/10/2024), 850 mg (1/23/2024), 800 mg (4/23/2024), 800 mg (5/7/2024), 800 mg (5/22/2024), 800 mg (6/4/2024), 800 mg (6/18/2024), 800 mg (7/1/2024), 800 mg (7/16/2024), 800 mg (7/30/2024)  oxaliplatin (ELOXATIN) chemo infusion, 85 mg/m2 = 190.4 mg, Intravenous, Once, 12 of 12 cycles  Dose modification: 68 mg/m2 (original dose 85 mg/m2, Cycle 11, Reason: Other (Must fill in a comment), Comment: increased fatigue)  Administration: 190.4 mg (7/31/2023), 190.4 mg (8/14/2023), 200 mg (8/28/2023), 200 mg (9/11/2023), 200 mg (9/25/2023), 200 mg (10/9/2023), 200 mg (10/23/2023), 200 mg (11/6/2023), 200 mg (11/20/2023), 190.4 mg (12/4/2023), 150 mg (12/18/2023), 150 mg  (1/10/2024)  fluorouracil (ADRUCIL) ambulatory infusion Soln, 1,200 mg/m2/day = 5,375 mg, Intravenous, Over 46 hours, 21 of 23 cycles     9/26/2023 -  Cancer Staged    Staging form: Colon and Rectum, AJCC 8th Edition  - Pathologic: pT3, pN0, cM1 - Signed by Vickie Israel MD on 4/3/2024  Total positive nodes: 0       3/12/2024 Surgery    A. Terminal ileum, appendix, right colon (right hemicolectomy):  - Adenocarcinoma (5.2cm)  - Forty-nine (49) lymph nodes negative for carcinoma (0/49)    - Acellular mucin present in one (1) lymph node   - See staging synoptic (ypT3N0)     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HCC)   7/27/2023 Initial Diagnosis    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HCC)     7/27/2023 -  Cancer Staged    Staging form: Pharynx - Oropharynx, AJCC 8th Edition  - Clinical stage from 7/27/2023: Stage III (cT1, cN3, cM0, p16+) - Signed by Evan Plummer MD on 7/27/2023  Stage prefix: Initial diagnosis       9/17/2024 -  Chemotherapy    alteplase (CATHFLO), 2 mg, Intracatheter, Every 1 Minute as needed, 2 of 7 cycles  CISplatin (PLATINOL) infusion, 70 mg (original dose 40 mg/m2), Intravenous, Once, 2 of 7 cycles  Dose modification: 70 mg (original dose 40 mg/m2, Cycle 1, Reason: Anticipated Tolerance)  Administration: 70 mg (9/17/2024)  fosaprepitant (EMEND) IVPB, 150 mg, Intravenous, Once, 2 of 7 cycles  Administration: 150 mg (9/17/2024)       Past Medical & Surgical Hx:   Patient Active Problem List   Diagnosis    Primary hypertension    Mixed hyperlipidemia    Malignant neoplasm of right female breast (HCC)    Vitamin D deficiency    Prediabetes    Right thyroid nodule    GERD (gastroesophageal reflux disease)    Obesity    Metastatic colon cancer to liver (HCC)    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HCC)    Poor appetite    Nutritional anemia    Dehydration    Drug-induced constipation    Chemotherapy induced neutropenia (HCC)    Stage 3a chronic kidney disease (HCC)     Thrombocytopenia (HCC)    Neuropathy    Diastolic dysfunction    Hypokalemia    Leukemoid reaction    GOGO (acute kidney injury) (HCC)    Acute post-operative pain    At risk for constipation    At risk for delirium    Advance care planning    Physical deconditioning    History of CVA (cerebrovascular accident)    Chronic nasal congestion    Elevated LFTs    Vitamin B12 deficiency     Past Medical History:   Diagnosis Date    Anemia     Arthritis     Body mass index (BMI) 40.0-44.9, adult (HCC)     Breast cancer (HCC) 12/17/2018    Cancer (HCC)     right breast, colon, liver, right tonsil    Cervical lymphadenopathy     CT neck on 3/30/2023- Large right level 2A lymphadenopathy as described above and suspicious for neoplasm.  Correlation with histopathology is recommended.  Mild asymmetric prominence of the right palatine tonsil with otherwise no definitive nodular enhancing lesions identified along the course of the aerodigestive tract.     5/26/23- FNA of this node was nonrevealing for tissue etiology, but it d    Encounter for screening for HIV 07/07/2022    Follow-up examination 04/04/2023    GERD (gastroesophageal reflux disease)     Hyperlipidemia     Hypertension     Obesity     Stroke (HCC)     TIA     Stroke (HCC)     TIA     Transaminitis 09/22/2021    Vasomotor rhinitis     Refilled flonase today. Stopped taking since January 2022     Past Surgical History:   Procedure Laterality Date    BREAST BIOPSY Right 11/23/2018    us guided bx cancer    BREAST SURGERY      COLONOSCOPY      ESOPHAGOSCOPY N/A 07/20/2023    Procedure: ESOPHAGOSCOPY;  Surgeon: David Chapa MD;  [facility];  Service: ENT    HEMICOLOECTOMY W/ ANASTOMOSIS Right 3/12/2024    Procedure: RIGHT COLECTOMY WITH ROBOTICS;  Surgeon: Vickie Israel MD;  [facility];  Service: Surgical Oncology    IR BIOPSY LIVER MASS  06/27/2023    IR CRYOABLATION  6/3/2024    IR PORT CHECK  01/03/2024    IR PORT PLACEMENT  07/28/2023     IR PORT STRIPPING  01/09/2024    LAPAROTOMY N/A 3/12/2024    Procedure: LAPAROTOMY EXPLORATORY W/ ROBOTICS;  Surgeon: Vickie Israel MD;  [facility]: [facility];  Service: Surgical Oncology    DE [facility] INCL FLUOR GDNCE DX W/CELL WASHG SPX N/A 07/20/2023    Procedure: BRONCHOSCOPY;  Surgeon: David Chapa MD;  [facility];  Service: ENT    DE LARYNGOSCOPY W/BIOPSY MICROSCOPE/TELESCOPE N/A 07/20/2023    Procedure: MICRODIRECT LARYNGOSCOPY WITH BIOPSY;  Surgeon: David Chapa MD;  [facility]: [facility];  Service: ENT    DE MASTECTOMY PARTIAL W/AXILLARY LYMPHADENECTOMY Right 12/20/2018    Procedure: ULTRASOUND LOCALIZED PARTIAL MASTECTOMY W/SENTINEL NODE BIOPSY POSS AXILLARY DISSECTION;  Surgeon: Zahida Coronado MD;  Location: [facility];  Service: General    TUBAL LIGATION      US GUIDED BREAST BIOPSY RIGHT COMPLETE Right 11/23/2018    US GUIDED INJECTION FOR RESEARCH STUDY  12/20/2018    US GUIDED INJECTION FOR RESEARCH STUDY  12/07/2018    US GUIDED INJECTION FOR RESEARCH STUDY  05/03/2019    US GUIDED LYMPH NODE BIOPSY RIGHT  05/26/2023       Review of Medications:   Vitamins, Supplements and Herbals: No, pt denies taking supplements    Current Outpatient Medications:     anastrozole (ARIMIDEX) 1 mg tablet, Take 1 tablet (1 mg total) by mouth daily, Disp: 90 tablet, Rfl: 3    atorvastatin (LIPITOR) 40 mg tablet, Take 1 tablet (40 mg total) by mouth daily at bedtime, Disp: 30 tablet, Rfl: 2    docusate sodium (COLACE) 50 mg capsule, Take 1 capsule (50 mg total) by mouth 2 (two) times a day, Disp: 90 capsule, Rfl: 1    ergocalciferol (VITAMIN D2) 50,000 units, Take 1 capsule (50,000 Units total) by mouth once a week, Disp: 90 capsule, Rfl: 3    ferrous sulfate 325 (65 Fe) mg tablet, Take 1 tablet (325 mg total) by mouth daily with breakfast (Patient not taking: Reported on 7/12/2024), Disp: 30 tablet, Rfl: 0    folic acid (FOLVITE) 1 mg tablet, Take 1 tablet (1 mg total) by mouth in the morning,  Disp: 90 tablet, Rfl: 3    gabapentin (NEURONTIN) 300 mg capsule, Take 1 pills in the morning, 1 pill at lunch and 2 pills before bed, Disp: 120 capsule, Rfl: 3    hydrocortisone 1 % ointment, , Disp: , Rfl:     lidocaine-prilocaine (EMLA) cream, Apply topically as needed for mild pain, Disp: 30 g, Rfl: 3    losartan (COZAAR) 50 mg tablet, Take 1 tablet (50 mg total) by mouth daily, Disp: 90 tablet, Rfl: 5    mirtazapine (REMERON) 15 mg tablet, Take 1 tablet (15 mg total) by mouth daily at bedtime Patient is also on a 30 mg tablet, for a total dose of 45 mg, Disp: 30 tablet, Rfl: 3    mirtazapine (REMERON) 30 mg tablet, Take 1 tablet (30 mg total) by mouth daily at bedtime, Disp: 30 tablet, Rfl: 3    omeprazole (PriLOSEC) 20 mg delayed release capsule, Take 1 capsule (20 mg total) by mouth daily, Disp: 30 capsule, Rfl: 5    ondansetron (ZOFRAN) 8 mg tablet, Take 1 tablet (8 mg total) by mouth every 8 (eight) hours as needed for nausea or vomiting, Disp: 90 tablet, Rfl: 2    potassium chloride (Klor-Con M20) 20 mEq tablet, Take 1 tablet (20 mEq total) by mouth 2 (two) times a day, Disp: 90 tablet, Rfl: 1    prochlorperazine (COMPAZINE) 10 mg tablet, Take 1 tablet (10 mg total) by mouth every 6 (six) hours as needed for nausea or vomiting, Disp: 90 tablet, Rfl: 2    pyridoxine (VITAMIN B6) 50 mg tablet, Take 1 tablet (50 mg total) by mouth daily, Disp: 90 tablet, Rfl: 3    vitamin B-12 (VITAMIN B-12) 1,000 mcg tablet, , Disp: , Rfl:   No current facility-administered medications for this visit.    Facility-Administered Medications Ordered in Other Visits:     CISplatin (PLATINOL) 70 mg in sodium chloride 0.9 % 250 mL infusion, 70 mg, Intravenous, Once, Harika Medina DO, Last Rate: 320 mL/hr at 09/23/24 1340, 70 mg at 09/23/24 1340    magnesium sulfate 2 g/50 mL IVPB (premix) 2 g, 2 g, Intravenous, Once, Harika Agostino, DO    sodium chloride 0.9 % bolus 500 mL, 500 mL, Intravenous, Once, Harika Agostino, DO    Most  "Recent Lab Results:   Lab Results   Component Value Date    WBC 5.65 09/20/2024    NEUTROABS 3.40 09/20/2024    IRON 55 05/07/2024    TIBC 292 05/07/2024    FERRITIN 145 05/07/2024    CHOLESTEROL 167 04/24/2024    TRIG 137 04/24/2024    HDL 43 (L) 04/24/2024    LDLCALC 97 04/24/2024    ALT 13 09/20/2024    AST 22 09/20/2024    ALB 3.3 (L) 09/20/2024    SODIUM 138 09/20/2024    SODIUM 138 09/13/2024    K 3.8 09/20/2024    K 3.6 09/13/2024     09/20/2024    BUN 16 09/20/2024    BUN 17 09/13/2024    CREATININE 0.75 09/20/2024    CREATININE 0.81 09/13/2024    EGFR 82 09/20/2024    PHOS 1.7 (L) 03/15/2024    PHOS 2.4 03/14/2024    TSH 1.58 01/03/2022    GLUCOSE 209 (H) 03/12/2024    POCGLU 101 08/05/2024    GLUF 95 09/13/2024    GLUF 101 (H) 06/03/2024    GLUC 87 09/20/2024    HGBA1C 5.5 02/21/2024    HGBA1C 5.9 (H) 06/13/2023    HGBA1C 6.1 (H) 07/09/2022    CALCIUM 8.9 09/20/2024    FOLATE >20.0 (H) 05/23/2019    MG 1.7 (L) 09/20/2024       Anthropometric Measurements:   Height: 66\"  Ht Readings from Last 1 Encounters:   09/23/24 5' 5.98\" (1.676 m)     Wt Readings from Last 20 Encounters:   09/23/24 88.2 kg (194 lb 7.1 oz)   09/17/24 90 kg (198 lb 6.6 oz)   09/13/24 88.5 kg (195 lb)   09/10/24 89.8 kg (198 lb)   09/03/24 89.8 kg (198 lb)   08/20/24 92.2 kg (203 lb 3.2 oz)   08/16/24 90.3 kg (199 lb)   08/06/24 88.9 kg (196 lb)   07/30/24 87.8 kg (193 lb 9 oz)   07/16/24 89 kg (196 lb 3.4 oz)   07/12/24 87.7 kg (193 lb 6.4 oz)   07/03/24 87.5 kg (193 lb)   07/01/24 86.8 kg (191 lb 5.8 oz)   06/28/24 87.4 kg (192 lb 9.6 oz)   06/18/24 89.4 kg (197 lb 1.5 oz)   06/06/24 89.1 kg (196 lb 6.9 oz)   06/04/24 87 kg (191 lb 12.8 oz)   06/03/24 83.9 kg (185 lb)   05/22/24 86.6 kg (190 lb 14.7 oz)   05/21/24 85.5 kg (188 lb 6.4 oz)       Weight History:   Usual Weight: 275#  [employer]: (2/22/19) 264.6#, (4/2/19) 263.4, (9/16/24) 197.4#, (9/23/24) 194#  Home Scale: n/a    Oncology Nutrition-Anthropometrics      Flowsheet Row " Nutrition from 9/23/2024 in [facility] Nutrition from 9/13/2024 in [facility]   Patient [age] (years): 69 years 69 years   Patient (female) height (in): 66 in 66 in   Current Weight to be used for anthropometric calculations (lbs) 194.4 lbs 198 lbs   Current Weight to be used for anthropometric calculations (kg) 88.4 kg 90 kg   BMI: 31.4 32   IBW female: 130 lbs 130 lbs   IBW (kg) female: 59.1 kg 59.1 kg   IBW % (female) 149.5 % 152.3 %   Adjusted BW (female): 146.1 lbs 147 lbs   Adjusted BW kg (female): 66.4 kg 66.8 kg   % weight change after 1 week: -2 % 0 %   Weight change after 1 week (lbs) -4 lbs 0 lbs   % weight change after 1 month: -4.3 % -0.5 %   Weight change after 1 month (lbs) -8.8 lbs -1 lbs   % weight change after 3 months: 0.9 % 0.5 %   Weight change after 3 months (lbs) 1.8 lbs 0.9 lbs            Nutrition-Focused Physical Findings: none observed    Food/Nutrition-Related History & Client/Social History:    Current Nutrition Impact Symptoms:  [] Nausea  [x] Reduced Appetite  [] Acid Reflux    [] Vomiting  [x] Unintended Wt Loss [] Malabsorption    [] Diarrhea  [] Unintended Wt Gain  [] Dumping Syndrome    [] Constipation  [] Thick Mucous/Secretions  [] Abdominal Pain    [] Dysgeusia (Altered Taste)  [x] Xerostomia (Dry Mouth)  [] Gas    [] Dysosmia (Altered Smell)  [] Thrush  [] Difficulty Chewing    [] Oral Mucositis (Sore Mouth)  [x] Fatigue  [] Hyperglycemia   Lab Results   Component Value Date    HGBA1C 5.5 02/21/2024      [] Odynophagia  [] Esophagitis  [] Other:    [x] Dysphagia  [] Early Satiety  [] No Problems Eating      Food Allergies & Intolerances: no    Current Diet: Soft/Moist. Chewing food well before swallowing  Current Nutrition Intake: Decreased since last visit  Appetite: Good, Fair   Nutrition Route: PO  Oral Care: brushes daily  Activity level: ADLs    24 Hr Diet Recall:   Breakfast: oatmeal  Snack:  nothing  Lunch: cup of noodles   Snack: Ensure complete  Dinner: cereal with 2% milk  Snack: nothing    Beverages: water (16.9oz x 2), decaf coffee (8oz x3), soda- DrAlfredo Pepper (12oz x1, Ensure (10oz x1-2)  Supplements:   Ensure Complete (10 oz, 350 kcal, 30 g pro) 1-2 times daily      Oncology Nutrition-Estimated Needs      Flowsheet Row Nutrition from 2024 in [facility] Nutrition from 2024 in [facility]   Weight type used Adjusted weight Actual weight   Weight in kilograms (kg) used for estimated needs 66.4 kg 90 kg   Energy needs formula:  30-35 kcal/kg 30-35 kcal/kg   Energy needs based on 30 kcal/k kcal 2700 kcal   Energy needs based on 35 kcal/k kcal 3150 kcal   Protein needs formula: 1.2-1.5 g/kg 1.2-1.5 g/kg   Protein needs based on 1.2 g/k g 108 g   Protein needs based on 1.5 g/kg 100 g 135 g   Fluid needs formula: 30-35 mL/kg 30-35 mL/kg   Fluid needs based on 30 mL/kg 1992 mL 2700 mL   Fluid needs in ounces 67 oz 91 oz   Fluid needs based on 35 mL/kg 2324 mL 3150 mL   Fluid needs in ounces 79 oz 106 oz             Discussion & Intervention:   Maira was evaluated today for an RD follow up regarding HNC.  Maira is planned to undergo tx for HNC .  Met with Maira today during her infusion. She reports some more difficulty with swallowing since starting tx. She states she has to chew her food very well before swallowing and is doing better with soft/moist foods. She is drinking Ensure complete shakes, usually 1-2 times a day. Encouraged her to at least aim for 2 daily. Her weight is down slightly since starting tx. She is having a Peg placed next week. Discussed once it is placed, she can start using it for supplemental nutrition. If PO intake decreases or weight drops more, discussed we will need to increase amount of TF.    Reviewed 24 hour recall, which revealed an inadequate po intake, and  discussed ways to increase kcal, protein, and fluid intakes and optimize nutrient intake.  Also reviewed the importance of wt management throughout the tx process and the role of a high kcal/ high protein diet and enteral nutrition in managing wt and overall health.  Based on today's assessment, discussion included: MNT for:  xerostomia, dysphagia, enteral nutrition, how to modify foods for anticipated nutrition impact symptoms pt may experience during CA tx, practicing proper oral care, a high kcal/protein diet & food choices to include at all meals & snacks (Examples of high kcal foods: cheese, full-fat dairy products, nut butter, plant-based fats, coconut oil/milk, avocado, butter, cream soup, etc. Examples of high protein foods: eggs, chicken, fish, beans/legumes, nuts/nut butters, bone broth, etc.) , fortifying foods for added kcal and protein (examples include: adding cheese to foods such as eggs, mashed potatoes, casseroles, etc.; Making oatmeal with whole milk rather than water; Making fortified mashed potatoes with cream, butter, dry milk powder, plain Greek yogurt, and cheese.), adequate hydration & fluid choices, sipping on calorie containing beverages (examples include: adding whole milk or cream to coffee, oral nutrition supplements, juice, electrolyte replacement beverages, milk, etc.), eating smaller more frequent meals every 2-3 hours (5-6 small meals/day), eating when feeling most hungry, increasing oral nutrition supplements, and adding extra fat to foods while cooking such as butter, plant-based oil, coconut oil/milk, avocado, nut butters, etc..   Moving forward, Maira will continue current nutrition plan of care   Materials Provided: Feeding Tube Use & Care booklet, Ensure samples, and Ensure Coupons  All questions and concerns addressed during today’s visit.  Maira has RD contact information.    Nutrition Diagnosis:   Inadequate Energy Intake related to physiological causes, disease state  and treatment related issues as evidenced by food recall, wt loss and discussion with pt and/or family.  Increased Nutrient Needs (kcal & pro) related to increased demand for nutrients and disease state as evidenced by cancer dx and pt undergoing tx for cancer.  Monitoring & Evaluation:   Goals:  weight maintenance/stabilization  adequate nutrition impact symptom management  pt to meet >/=75% estimated nutrition needs daily    Progress Towards Goals: Progressing    Nutrition Rx & Recommendations:  Diet: High Calorie, High Protein (for high calorie foods see pages 52-53, and for high protein foods see pages 49-51 in your Eating Hints book)  Nutrition Supplements: Ensure complete shakes twice daily.  May use homemade shakes/smoothies as desired.  If using a pre-made shake/bar, choose ones with >300 calories and >10 grams protein (ex. Ensure Complete, Ensure Enlive, Ensure Plus, Boost Plus, Boost Very High Calorie, etc.).  Small, frequent meals/snacks may be easier to tolerate than 3 large daily meals.  Aim for 5-6 small meals per day.  Include protein at all meals/snacks.  Include a variety of foods (as tolerated/allowed by diet).  Stay hydrated by sipping fluids of choice/tolerance throughout the day.    Liquid nutrition may be better tolerated than solids at times.  Alter food choices and eating patterns to accommodate changing needs.  Refer to Eating Hints book for other meal/snack ideas and symptom management.  Avoid spicy, acidic, sharp/hard/crunchy foods & carbonated beverages as needed.  Follow proper oral care; Try baking soda/salt water rinse recipe (mix 3/4 tsp salt + 1 tsp baking soda + 1 qt water; rinse with plain water after using) in Eating Hints book (pg 18).  Brush your teeth before/after meals & before bed.  For trouble swallowing: eat 5-6 small meals/day; choose foods that are easy to swallow; choose foods that are high in protein & calories; cook foods until they are soft/tender; cut food into small  pieces; moisten & soften foods (gravy/sauce/broth/yogurt); sip drinks through a straw (as tolerated); avoid foods/drinks that can burn/scrape your throat (hot temperatures, spicy foods, acidic foods, sharp/hard/crunchy foods, alcohol); talk to your doctor about a Speech Therapy referral for a swallowing evaluation (see page 26-28 in your Eating Hints book).  For dry mouth: sip water throughout the day; try very sweet drinks; chew gum or suck on hard candy, popsicles, & ice chips; eat easy to swallow foods (such as pureed cooked foods or soups); moisten food with sauce/gravy/salad dressing; do not drink beer, wine, or any type of alcohol; keep lips moist with lip balm; avoid tobacco products & second-hand smoke; try Biotene as needed (see pages 17-18 in your Eating Hints book).  Follow proper oral care; Try baking soda/salt water rinse recipe (mix 3/4 tsp salt + 1 tsp baking soda + 1 qt water; rinse with pain water after using) in Eating Hints book (pg 18).  Brush your teeth before/after meals & before bed.  Weigh yourself regularly. If you notice weight loss, make an effort to increase your daily food/calorie intake. If you continue to notice loss after these efforts, reach out to your dietitian to establish a plan to stabilize weight.   Soft/easy to swallow foods that are high in calories and protein: casseroles, chicken/egg/tuna salad with extra garcia to add calories and moisture, oatmeal/cream of wheat made with whole milk, cottage cheese, whole milk Greek yogurt, scrambled eggs with cheese, avocado, macaroni and cheese, mashed potatoes made with butter and whole milk or dry milk powder, ground meat with extra sauce/gravy, canned fruit, peanut butter, cream soups (cream of chicken, cream of mushroom, broccoli cheddar), pudding made with whole milk, custard, ice cream, banana, milkshakes/smoothies, Review page 47 of Eating Hints Book for more ideas.   Switch to whole milk for more calories  TF plan once PEG is  placed:  Recommend starting with 1-2 cartons of jevity 1.5 daily  If PO intake decreases or weight drops, will need to work on increasing to goal amount of:  TF Goal: 1 container (237 mL) 5 times per day + 1.5 containers (355 mL) once/day of Jevity 1.5 via PEG (push syringe or gravity syringe method to administer boluses; 1 container to infuse over ~15-20 minutes). (Total of 6.5 cartons/day)  Water Flushin mL free water flush pre/post each bolus (720 mL water)   Enteral Nutrition goal to provide: 1540 mL volume (6.5 containers/day), 2310 kcal, 98 grams protein, 1171 mL free water, 1891 mL total water daily.  Allow for substitutions  Your syringes are each 60 mL or 2 fl oz.  Always flush your feeding tube with 60 mL room-temp water 1-2 times daily while feeding tube is not in use.  Call [facility] when you have 10 days left of TF supplies: [phone], option 3 (customer support).      Follow Up Plan:  24 during infusion  Recommend Referral to Other Providers: none at this time

## 2024-09-24 ENCOUNTER — APPOINTMENT (OUTPATIENT)
Dept: RADIATION ONCOLOGY | Facility: CLINIC | Age: 69
End: 2024-09-24
Attending: RADIOLOGY
Payer: MEDICARE

## 2024-09-24 PROCEDURE — 77014 CHG CT GUIDANCE RADIATION THERAPY FLDS PLACEMENT: CPT | Performed by: RADIOLOGY

## 2024-09-24 PROCEDURE — 77386 HB NTSTY MODUL RAD TX DLVR CPLX: CPT | Performed by: RADIOLOGY

## 2024-09-25 ENCOUNTER — APPOINTMENT (OUTPATIENT)
Dept: RADIATION ONCOLOGY | Facility: CLINIC | Age: 69
End: 2024-09-25
Attending: RADIOLOGY
Payer: MEDICARE

## 2024-09-25 ENCOUNTER — TELEPHONE (OUTPATIENT)
Dept: RADIOLOGY | Facility: HOSPITAL | Age: 69
End: 2024-09-25

## 2024-09-25 PROCEDURE — 77386 HB NTSTY MODUL RAD TX DLVR CPLX: CPT | Performed by: INTERNAL MEDICINE

## 2024-09-25 PROCEDURE — 77014 CHG CT GUIDANCE RADIATION THERAPY FLDS PLACEMENT: CPT | Performed by: INTERNAL MEDICINE

## 2024-09-26 ENCOUNTER — APPOINTMENT (OUTPATIENT)
Dept: RADIATION ONCOLOGY | Facility: CLINIC | Age: 69
End: 2024-09-26
Attending: RADIOLOGY
Payer: MEDICARE

## 2024-09-26 ENCOUNTER — PATIENT OUTREACH (OUTPATIENT)
Dept: HEMATOLOGY ONCOLOGY | Facility: CLINIC | Age: 69
End: 2024-09-26

## 2024-09-26 NOTE — PROGRESS NOTES
Pt called to confirm she has transportation to her appointment today. I confirmed via round trip that she is scheduled for transportation today. She understood, and was thankful for the assistance   Pt called stating her ride never showed, e mail sent to  [facility]  for clarification

## 2024-09-27 ENCOUNTER — PATIENT OUTREACH (OUTPATIENT)
Dept: CASE MANAGEMENT | Facility: OTHER | Age: 69
End: 2024-09-27

## 2024-09-27 ENCOUNTER — APPOINTMENT (OUTPATIENT)
Dept: RADIATION ONCOLOGY | Facility: CLINIC | Age: 69
End: 2024-09-27
Attending: RADIOLOGY
Payer: MEDICARE

## 2024-09-27 ENCOUNTER — HOSPITAL ENCOUNTER (OUTPATIENT)
Dept: INFUSION CENTER | Facility: CLINIC | Age: 69
End: 2024-09-27
Payer: MEDICARE

## 2024-09-27 DIAGNOSIS — C78.7 METASTATIC COLON CANCER TO LIVER (HCC): Primary | ICD-10-CM

## 2024-09-27 DIAGNOSIS — C18.9 METASTATIC COLON CANCER TO LIVER (HCC): Primary | ICD-10-CM

## 2024-09-27 DIAGNOSIS — Z17.0 MALIGNANT NEOPLASM OF UPPER-OUTER QUADRANT OF RIGHT BREAST IN FEMALE, ESTROGEN RECEPTOR POSITIVE (HCC): Primary | ICD-10-CM

## 2024-09-27 DIAGNOSIS — Z46.59 ENCOUNTER FOR CARE RELATED TO FEEDING TUBE: ICD-10-CM

## 2024-09-27 DIAGNOSIS — C50.411 MALIGNANT NEOPLASM OF UPPER-OUTER QUADRANT OF RIGHT BREAST IN FEMALE, ESTROGEN RECEPTOR POSITIVE (HCC): Primary | ICD-10-CM

## 2024-09-27 LAB
ALBUMIN SERPL BCG-MCNC: 3.4 G/DL (ref 3.5–5)
ALP SERPL-CCNC: 92 U/L (ref 34–104)
ALT SERPL W P-5'-P-CCNC: 14 U/L (ref 7–52)
ANION GAP SERPL CALCULATED.3IONS-SCNC: 4 MMOL/L (ref 4–13)
AST SERPL W P-5'-P-CCNC: 24 U/L (ref 13–39)
BASOPHILS # BLD AUTO: 0.02 THOUSANDS/ΜL (ref 0–0.1)
BASOPHILS NFR BLD AUTO: 0 % (ref 0–1)
BILIRUB SERPL-MCNC: 0.33 MG/DL (ref 0.2–1)
BUN SERPL-MCNC: 22 MG/DL (ref 5–25)
CALCIUM ALBUM COR SERPL-MCNC: 9.4 MG/DL (ref 8.3–10.1)
CALCIUM SERPL-MCNC: 8.9 MG/DL (ref 8.4–10.2)
CHLORIDE SERPL-SCNC: 107 MMOL/L (ref 96–108)
CO2 SERPL-SCNC: 27 MMOL/L (ref 21–32)
CREAT SERPL-MCNC: 1.15 MG/DL (ref 0.6–1.3)
EOSINOPHIL # BLD AUTO: 0.14 THOUSAND/ΜL (ref 0–0.61)
EOSINOPHIL NFR BLD AUTO: 3 % (ref 0–6)
ERYTHROCYTE [DISTWIDTH] IN BLOOD BY AUTOMATED COUNT: 16.7 % (ref 11.6–15.1)
GFR SERPL CREATININE-BSD FRML MDRD: 49 ML/MIN/1.73SQ M
GLUCOSE SERPL-MCNC: 102 MG/DL (ref 65–140)
HCT VFR BLD AUTO: 29.7 % (ref 34.8–46.1)
HGB BLD-MCNC: 9.6 G/DL (ref 11.5–15.4)
IMM GRANULOCYTES # BLD AUTO: 0.01 THOUSAND/UL (ref 0–0.2)
IMM GRANULOCYTES NFR BLD AUTO: 0 % (ref 0–2)
LYMPHOCYTES # BLD AUTO: 0.8 THOUSANDS/ΜL (ref 0.6–4.47)
LYMPHOCYTES NFR BLD AUTO: 14 % (ref 14–44)
MAGNESIUM SERPL-MCNC: 1.7 MG/DL (ref 1.9–2.7)
MCH RBC QN AUTO: 28.3 PG (ref 26.8–34.3)
MCHC RBC AUTO-ENTMCNC: 32.3 G/DL (ref 31.4–37.4)
MCV RBC AUTO: 88 FL (ref 82–98)
MONOCYTES # BLD AUTO: 0.57 THOUSAND/ΜL (ref 0.17–1.22)
MONOCYTES NFR BLD AUTO: 10 % (ref 4–12)
NEUTROPHILS # BLD AUTO: 4.03 THOUSANDS/ΜL (ref 1.85–7.62)
NEUTS SEG NFR BLD AUTO: 73 % (ref 43–75)
NRBC BLD AUTO-RTO: 0 /100 WBCS
PLATELET # BLD AUTO: 221 THOUSANDS/UL (ref 149–390)
PMV BLD AUTO: 10.7 FL (ref 8.9–12.7)
POTASSIUM SERPL-SCNC: 4.1 MMOL/L (ref 3.5–5.3)
PROT SERPL-MCNC: 6.8 G/DL (ref 6.4–8.4)
RBC # BLD AUTO: 3.39 MILLION/UL (ref 3.81–5.12)
SODIUM SERPL-SCNC: 138 MMOL/L (ref 135–147)
WBC # BLD AUTO: 5.57 THOUSAND/UL (ref 4.31–10.16)

## 2024-09-27 PROCEDURE — 80053 COMPREHEN METABOLIC PANEL: CPT | Performed by: INTERNAL MEDICINE

## 2024-09-27 PROCEDURE — 77014 CHG CT GUIDANCE RADIATION THERAPY FLDS PLACEMENT: CPT | Performed by: INTERNAL MEDICINE

## 2024-09-27 PROCEDURE — 77386 HB NTSTY MODUL RAD TX DLVR CPLX: CPT | Performed by: INTERNAL MEDICINE

## 2024-09-27 PROCEDURE — 83735 ASSAY OF MAGNESIUM: CPT | Performed by: INTERNAL MEDICINE

## 2024-09-27 PROCEDURE — 85025 COMPLETE CBC W/AUTO DIFF WBC: CPT | Performed by: INTERNAL MEDICINE

## 2024-09-27 NOTE — PATIENT INSTRUCTIONS
Feeding Tube Use & Care  Approved by the Patient Education Committee  Approval Date:  1/20/2021 Approval Number:      This guide has been developed to explain how to properly care for feeding tubes and to prevent problems like irritation, infection, or clogging.  Storage of Feeding Formula  Store unopened formula for tube feeding at room temperature. Do not let it freeze.  Opened cans or containers of formula should be labeled with the date that it was opened, covered and stored in the refrigerator.   Proper storage will reduce the risk for contamination and prevent spoilage.   Discard all unused formula from opened cans or containers after 24 hours.      Preparing to Use the Feeding Tube for Feeding or Medicine   If the feeding formula is stored in the refrigerator, allow the formula to stand at room temperature for 15 to 20 minutes before a feeding.   Very cold liquids may upset the stomach.  Sit up straight or position yourself so your shoulders are higher than your stomach.   Do not lie flat on your back.   Sitting upright and having your shoulders higher than your stomach can help reduce the risk for liquids accidentally getting into the lungs (aspiration) and stomach acid backing up from the stomach (reflux).  Always wash your hands with mild soap and warm water before touching the feeding equipment, the formula, or giving medicines.  Always flush the feeding tube with water before and after each feeding, and before and after administering medicines.   Flushing the tube keeps it clean and prevents the tube from clogging.  Call your health care team if you have diarrhea, constipation, nausea, vomiting, or skin irritation around the tube insertion site.    How to Clean Around Your Feeding Tube  Follow these directions once every day to clean around your feeding tube.  Wash your hands with mild soap and water and dry them with a clean towel.  Wet a soft, clean cloth or gauze with warm, soapy  water.  Gently wipe the skin around the tube with the cloth. Also clean the base of the tube.  Carefully clean the skin under the bumper with the cloth. Do not pull on the feeding tube.  Look for redness, swelling, bleeding, or leakage around the tube.  If you notice any of these things, report them to your health care team immediately. Do not wait another day.  Rinse the area you cleaned with clear, warm water. (It is also safe to rinse off in the shower.)  Pat the area dry with another clean, soft cloth.  Apply a protective skin barrier or antibacterial ointment to the area around tube if you have been told to do so by your health care team.  When you are finished, wash your hands again with mild soap and water and dry them with a clean towel.  For PEG tubes or G-Tubes Only   In addition to cleaning around the tube insertion site, gently push the base of the tube against the skin and twist the tube in a [place].   This step keeps the tube from sticking to the inside of the stomach.   Never twist a J-Tube or Jejunostomy tube.    How to Flush Your Feeding Tube  To keep your feeding tube clean and unclogged, you must flush it with warm water before and after feedings, and before and after medicines are administered.   Flushes also help you stay hydrated.   If you are not currently using your feeding tube for feedings or medicines, you will need to flush your feeding tube with 60 milliliters (mL) water at least once a day to keep the feeding tube clean and working.  Safety Alert: you should always check the temperature of the water before use.  Warm water should not measure more than 98.6OF. Because you may not measure the temperature of your water each time you flush, as a rule of thumb use room temperature water. Using hot water can cause injury such as burns to the lining of your stomach or intestines.    Use the following steps to flush your feeding tube before and after feedings.   Wash your hands with mild soap  and water and dry them with a clean towel.  Fill a clean container with warm water.  Place the tip of a large feeding syringe in the water.  Draw 60 mL of water into the syringe.  Pinch or bend the end of the feeding tube to keep the contents from leaking out.  Open the cap on the feeding tube.  Insert the tip of the syringe into the feeding tube.  Unbend the tube to allow the water to flow in.  Gently and slowly push the plunger of the syringe down.  When the syringe is empty, pinch or bend tube to keep contents from leaking out.  Then remove the syringe from the tube and close the cap on the feeding tube.  Tape the tube to your stomach with medical paper tape.  Wash your hands with mild soap and water and dry them with a clean towel.    How to Administer Medicines through a Feeding Tube  Wash your hands with mild soap and water and dry them with a clean towel. Check with your health care team before you take any medicine through a feeding tube.  Any medicine that is a solid, such as a tablet, given through the tube must be crushed thoroughly to a fine powder and dissolved in 15mL of warm water.  Some medicines should not be crushed (such as some extended-release medicines).   Different prescriptions or forms of the drug may be needed for use in a feeding tube (e.g., liquid).  You should always flush your feeding tube with at least 30 mL of water, before and after giving yourself your medicine.  Do not mix medicines together. You should give each medicine separately through your feeding tube and flush the tube with 5-15 mL of warm water between each medicine.    It is best to check with your pharmacist to see if any of your medicines need to be spaced out between administrations.  If you take fiber supplements, speak with your pharmacist to see if there is a substitution that can be given through your feeding tube.    If you are unable to substitute it you should speak with your dietitian to see if a different type  of tube feeding formula containing fiber is available.   If you need to take your medicine at the same time as your tube feeding, give the medicine half-way through the feeding. Never mix medicine with tube feeding formula.    Step-by-Step Instructions for Preparing Medicines to be Given through a Feeding Tube  Prepare each liquid medicine to be administered through your feeding tube and put it in a reachable but safe place.  Separately prepare each medicine that needs to be crushed before it is administered. Crush the medicine into a fine powder and place it in 15-30 mL of warm water for about 5 minutes before giving it through your feeding tube. Make sure the medicine is thoroughly dissolved in the water before giving it.   In a clean container, put enough warm water to flush the tube before and after each medicine is given. You will need at least ¾ cup of water for flushing, depending on the number of medicines you must give yourself.    To flush the feeding tube:  Pull the plunger out of your feeding syringe.  Bend or pinch the end of the feeding tube to prevent the contents from leaking out.  Open the cap on the feeding tube.  Put the tip of the syringe into the tube.  Fill the feeding syringe with:  Before administering your first medicine,  flush your tube with 30 mL of warm water  If you take more than one medicine, flush your tube with 5 -15 mL of warm water   After you have completed giving yourself your medicines, flush your tube with  30 mL of warm water   f. Unbend the feeding tube and let the water run into your feeding tube.  After the tube is flushed, pour the liquid or crushed-and-diluted medicine into feeding syringe.  Hold the syringe straight up and let the medicine run into the feeding tube.  Repeat step 4 to flush your tube again.  Repeat steps 2 through 7 for each medicine, giving water, then the medicine, then more water.  Remove the feeding syringe and close the feeding tube cap.     If you  have any questions or concerns about these guidelines or the steps to follow, please contact your doctor or healthcare provider.

## 2024-09-27 NOTE — PROGRESS NOTES
Maira Moura  tolerated central labs well with no complications.      Maira Moura is aware of future appt on Monday Sep 30, 2024 10:00 AM.     AVS declined by Maira Moura.

## 2024-09-30 ENCOUNTER — APPOINTMENT (OUTPATIENT)
Dept: RADIATION ONCOLOGY | Facility: CLINIC | Age: 69
End: 2024-09-30
Attending: RADIOLOGY
Payer: MEDICARE

## 2024-09-30 ENCOUNTER — PATIENT OUTREACH (OUTPATIENT)
Dept: CASE MANAGEMENT | Facility: OTHER | Age: 69
End: 2024-09-30

## 2024-09-30 ENCOUNTER — TELEPHONE (OUTPATIENT)
Dept: RADIOLOGY | Facility: HOSPITAL | Age: 69
End: 2024-09-30

## 2024-09-30 ENCOUNTER — NUTRITION (OUTPATIENT)
Dept: NUTRITION | Facility: CLINIC | Age: 69
End: 2024-09-30

## 2024-09-30 ENCOUNTER — HOSPITAL ENCOUNTER (OUTPATIENT)
Dept: INFUSION CENTER | Facility: CLINIC | Age: 69
Discharge: HOME | End: 2024-09-30
Payer: MEDICARE

## 2024-09-30 VITALS
HEIGHT: 66 IN | RESPIRATION RATE: 18 BRPM | WEIGHT: 194 LBS | DIASTOLIC BLOOD PRESSURE: 79 MMHG | SYSTOLIC BLOOD PRESSURE: 116 MMHG | HEART RATE: 96 BPM | TEMPERATURE: 97.9 F | BODY MASS INDEX: 31.18 KG/M2

## 2024-09-30 DIAGNOSIS — C09.9 RIGHT TONSILLAR SQUAMOUS CELL CARCINOMA (HCC): Primary | ICD-10-CM

## 2024-09-30 DIAGNOSIS — Z71.3 NUTRITIONAL COUNSELING: Primary | ICD-10-CM

## 2024-09-30 PROCEDURE — 77014 CHG CT GUIDANCE RADIATION THERAPY FLDS PLACEMENT: CPT | Performed by: RADIOLOGY

## 2024-09-30 PROCEDURE — 96368 THER/DIAG CONCURRENT INF: CPT

## 2024-09-30 PROCEDURE — 77386 HB NTSTY MODUL RAD TX DLVR CPLX: CPT | Performed by: RADIOLOGY

## 2024-09-30 PROCEDURE — 96361 HYDRATE IV INFUSION ADD-ON: CPT

## 2024-09-30 PROCEDURE — 96413 CHEMO IV INFUSION 1 HR: CPT

## 2024-09-30 PROCEDURE — 96367 TX/PROPH/DG ADDL SEQ IV INF: CPT

## 2024-09-30 RX ORDER — DIPHENHYDRAMINE HYDROCHLORIDE AND LIDOCAINE HYDROCHLORIDE AND ALUMINUM HYDROXIDE AND MAGNESIUM HYDRO
10 KIT EVERY 4 HOURS PRN
Qty: 480 ML | Refills: 1 | Status: SHIPPED | OUTPATIENT
Start: 2024-09-30

## 2024-09-30 RX ORDER — MAGNESIUM SULFATE HEPTAHYDRATE 40 MG/ML
2 INJECTION, SOLUTION INTRAVENOUS ONCE
Status: COMPLETED | OUTPATIENT
Start: 2024-09-30 | End: 2024-09-30

## 2024-09-30 RX ORDER — SODIUM CHLORIDE 9 MG/ML
20 INJECTION, SOLUTION INTRAVENOUS ONCE
Status: COMPLETED | OUTPATIENT
Start: 2024-09-30 | End: 2024-09-30

## 2024-09-30 RX ADMIN — SODIUM CHLORIDE 500 ML: 0.9 INJECTION, SOLUTION INTRAVENOUS at 12:10

## 2024-09-30 RX ADMIN — CISPLATIN 70 MG: 1 INJECTION, SOLUTION INTRAVENOUS at 13:15

## 2024-09-30 RX ADMIN — DEXAMETHASONE SODIUM PHOSPHATE: 10 INJECTION, SOLUTION INTRAMUSCULAR; INTRAVENOUS at 12:11

## 2024-09-30 RX ADMIN — MAGNESIUM SULFATE HEPTAHYDRATE 2 G: 40 INJECTION, SOLUTION INTRAVENOUS at 13:19

## 2024-09-30 RX ADMIN — SODIUM CHLORIDE 500 ML: 0.9 INJECTION, SOLUTION INTRAVENOUS at 14:21

## 2024-09-30 RX ADMIN — SODIUM CHLORIDE 20 ML/HR: 0.9 INJECTION, SOLUTION INTRAVENOUS at 12:10

## 2024-09-30 RX ADMIN — FOSAPREPITANT 150 MG: 150 INJECTION, POWDER, LYOPHILIZED, FOR SOLUTION INTRAVENOUS at 12:26

## 2024-09-30 NOTE — PATIENT INSTRUCTIONS
Nutrition Rx & Recommendations:  Diet: high calorie/high protein, soft diet as able. Enteral nutrition as 1' source of nutrition once PEG placed  Nutrition Supplements: increased Ensure Complete shakes to 4-5 daily until PEG is placed.  May use homemade shakes/smoothies as desired.  If using a pre-made shake/bar, choose ones with >300 calories and >10 grams protein (ex. Ensure Complete, Ensure Enlive, Ensure Plus, Boost Plus, Boost Very High Calorie, etc.).  Small, frequent meals/snacks may be easier to tolerate than 3 large daily meals.  Aim for 5-6 small meals per day.  Include protein at all meals/snacks.  Include a variety of foods (as tolerated/allowed by diet).  Stay hydrated by sipping fluids of choice/tolerance throughout the day.    Liquid nutrition may be better tolerated than solids at times.  Alter food choices and eating patterns to accommodate changing needs.  Refer to Eating Hints book for other meal/snack ideas and symptom management.  Avoid spicy, acidic, sharp/hard/crunchy foods & carbonated beverages as needed.  Follow proper oral care; Try baking soda/salt water rinse recipe (mix 3/4 tsp salt + 1 tsp baking soda + 1 qt water; rinse with plain water after using) in Eating Hints book (pg 18).  Brush your teeth before/after meals & before bed.  For sore mouth/throat: choose foods that are easy to chew/swallow; cook foods until they are soft/tender; moisten & soften foods (gravy/sauce/broth/yogurt); cut food into small pieces; drink with a straw (as tolerated); eat with a very small spoon; eat cold or room temperature food; suck on ice chips; Avoid citrus, spicy foods, tomatoes/ketchup, salty foods, raw vegetables, sharp/crunchy/hard foods & alcohol (see pages 23-28 in your Eating Hints book).  For trouble swallowing: eat 5-6 small meals/day; choose foods that are easy to swallow; choose foods that are high in protein & calories; cook foods until they are soft/tender; cut food into small pieces;  moisten & soften foods (gravy/sauce/broth/yogurt); sip drinks through a straw (as tolerated); avoid foods/drinks that can burn/scrape your throat (hot temperatures, spicy foods, acidic foods, sharp/hard/crunchy foods, alcohol); talk to your doctor about a Speech Therapy referral for a swallowing evaluation (see page 26-28 in your Eating Hints book).  For dry mouth: sip water throughout the day; try very sweet drinks; chew gum or suck on hard candy, popsicles, & ice chips; eat easy to swallow foods (such as pureed cooked foods or soups); moisten food with sauce/gravy/salad dressing; do not drink beer, wine, or any type of alcohol; keep lips moist with lip balm; avoid tobacco products & second-hand smoke; try Biotene as needed (see pages 17-18 in your Eating Hints book).  Follow proper oral care; Try baking soda/salt water rinse recipe (mix 3/4 tsp salt + 1 tsp baking soda + 1 qt water; rinse with pain water after using) in Eating Hints book (pg 18).  Brush your teeth before/after meals & before bed.  Weigh yourself regularly. If you notice weight loss, make an effort to increase your daily food/calorie intake. If you continue to notice loss after these efforts, reach out to your dietitian to establish a plan to stabilize weight.   Soft/easy to swallow foods that are high in calories and protein: casseroles, chicken/egg/tuna salad with extra garcia to add calories and moisture, oatmeal/cream of wheat made with whole milk, cottage cheese, whole milk Greek yogurt, scrambled eggs with cheese, avocado, macaroni and cheese, mashed potatoes made with butter and whole milk or dry milk powder, ground meat with extra sauce/gravy, canned fruit, peanut butter, cream soups (cream of chicken, cream of mushroom, broccoli cheddar), pudding made with whole milk, custard, ice cream, banana, milkshakes/smoothies, Review page 47 of Eating Hints Book for more ideas.   Switch to whole milk for more calories  Choose liquids with calories:  whole milk, Fairlife milk (higher protein/lactose-free milk), chocolate milk, drinkable yogurt, kefir, 100% fruit juice, diluted juice, bone broth (higher protein broth), creamy soups, sports drinks (Gatorade, Poweraide, Pedialyte, etc.), Italian ice, popsicles, milkshakes, smoothies, oral nutrition supplements (Ensure, Boost, etc.), gelatin/Jello, etc.  TF plan once PEG is placed:  TF Goal: 1 container (237 mL) 5 times per day + 1.5 containers (355 mL) once/day of Jevity 1.5 via PEG (push syringe or gravity syringe method to administer boluses; 1 container to infuse over ~15-20 minutes). (Total of 6.5 cartons/day)  Water Flushin mL free water flush pre/post each bolus (720 mL water)   Enteral Nutrition goal to provide: 1540 mL volume (6.5 containers/day), 2310 kcal, 98 grams protein, 1171 mL free water, 1891 mL total water daily.  Allow for substitutions  Your syringes are each 60 mL or 2 fl oz.  Always flush your feeding tube with 60 mL room-temp water 1-2 times daily while feeding tube is not in use.  Call [facility] when you have 10 days left of TF supplies: [phone], option 3 (customer support).      Follow Up Plan: 10/7/24 during infusion  Recommend Referral to Other Providers: none at this time

## 2024-09-30 NOTE — PLAN OF CARE
Problem: INFECTION - ADULT  Goal: Absence or prevention of progression during hospitalization  Description: INTERVENTIONS:  - Assess and monitor for signs and symptoms of infection  - Monitor lab/diagnostic results  - Monitor all insertion sites, i.e. indwelling lines, tubes, and drains  - Monitor endotracheal if appropriate and nasal secretions for changes in amount and color  - [place] appropriate cooling/warming therapies per order  - Administer medications as ordered  - Instruct and encourage patient and family to use good hand hygiene technique  - Identify and instruct in appropriate isolation precautions for identified infection/condition  Outcome: Progressing

## 2024-09-30 NOTE — PROGRESS NOTES
Patient arrived to the unit and denied complications. She did report a sore throat that she reported as mild. Radiation assessed her throat at their appointment and prescribed magic mouth wash. Patient tolerated her treatment well without adverse reaction. She is aware to return for blood work on 10/4/24.

## 2024-09-30 NOTE — PROGRESS NOTES
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     DT completed: yes  DT score: 2/10  Issues noted: fatigue, loss of appetite, sometimes worries about if she will be able to care for herself.     Marital status/Lives with: son (Amish) and daughter (Eliane)  Pt's support system: family  Mental Health history: none  Substance Abuse: none     Employment/income source: retired, receives social security   Concerns with bills (treatment vs household): none  Noted issues with home: none    Narrative note:  [facility] met with patient in infusion center. Patient denies any current needs. Patient does report some metallic taste, suggestion made to use plastic utensils. Patient reported that fatigue limits some things that she engages in.     OSW did provide [facility] contact information should she need assistance in the future. [facility] will close at this time.

## 2024-09-30 NOTE — PROGRESS NOTES
Outpatient Oncology Nutrition Consultation   Type of Consult: Follow Up   Care Location: [facility]    Reason for referral: Received notification by [facility] PEPE ZAPATA on 8/21/24 that pt has triggered for oncology nutrition care (reason for referral: HNC dx and Malnutrition Screening Tool (MST) Triggers: scored a 0 indicating No recent wt loss and is not eating poorly due to a decreased appetite. 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  - grade 2  - %  - ME 95%  - HER2 negative     12/20/2018 Surgery    Right partial mastectomy and SLN biopsy:  Infiltrating ductal carcinoma, Grade 2/3, felt to be between 2.0 cm and 2.5 cm in greatest dimension  - No invasive tumor is seen at inked margins, but there is a focus of DCIS seen within approximately 1mm of the inked medial margin  - no LVI  - no LCIS  - 0/2 LN's  at least Stage IIA - pT2, pN0, cM0, G2.    - Dr Coronado     12/20/2018 -  Cancer Staged    Stage IIA - pT2, pN0, cM0, G2.       1/4/2019 Genomic Testing    Oncotype DX score: 13     2/1/2019 -  Hormone Therapy    anastrozole 1 mg daily as adjuvant endocrine therapy    - Dr Daley       2/14/2019 - 4/3/2019 Radiation    Course: C1    Plan ID Energy  "Fractions Dose per Fraction (cGy) Dose Correction (cGy) Total Dose Delivered (cGy) Elapsed Days   R Breast 10X/6X 25 / 25 200 0 5,000 40   R BRST BOOST 16E 5 / 5 200 0 1,000 7      Treatment dates:  C1: 2/14/2019 - 4/3/2019       Metastatic colon cancer to liver (HCC)   7/6/2023 Genetic Testing    CARIS Results:   KRAS Pathogenic Variant Exon 2  p.G12D : lack of benefit of cetuximab, panitumumab Level 2   No other actionable mutation; MSI stable; TMB low   MiFOLOXAi Results: \"Decreased Benefit of FOLFOX + Bevacizumab in first line metastatic CRC.  This patient may achieve improved results by receiving an alternative to FOLFOX, such as FOLFIRI, as their initial regimen. As an adjustment to frontline FOLFOXIRI following toxicity: This patient may achieve improved results by removing the oxaliplatin portion of their regimen\".         7/11/2023 Initial Diagnosis    Metastatic colon cancer to liver (HCC)     7/13/2023 -  Cancer Staged    Staging form: Colon and Rectum, AJCC 8th Edition  - Clinical stage from 7/13/2023: cT3, cN0, pM1 - Signed by Harika Medina DO on 9/28/2023  Total positive nodes: 0       7/31/2023 - 8/1/2024 Chemotherapy    cyanocobalamin, 1,000 mcg, Intramuscular, Every 30 days, 7 of 9 cycles  Administration: 1,000 mcg (5/9/2024), 1,000 mcg (5/24/2024), 1,000 mcg (6/20/2024), 1,000 mcg (7/3/2024), 1,000 mcg (7/18/2024), 1,000 mcg (8/1/2024)  potassium chloride, 20 mEq, Intravenous, Once, 2 of 2 cycles  Administration: 20 mEq (11/6/2023), 20 mEq (11/20/2023)  alteplase (CATHFLO), 2 mg, Intracatheter, Every 1 Minute as needed, 21 of 23 cycles  Administration: 2 mg (1/3/2024)  pegfilgrastim (NEULASTA), 6 mg, Subcutaneous, Once, 12 of 12 cycles  Administration: 6 mg (10/25/2023), 6 mg (11/8/2023), 6 mg (11/22/2023), 6 mg (12/6/2023), 6 mg (12/20/2023), 6 mg (1/12/2024), 6 mg (1/25/2024), 6 mg (4/25/2024), 6 mg (5/9/2024), 6 mg (5/24/2024), 6 mg (6/20/2024)  fluorouracil (ADRUCIL), 895 mg, Intravenous, " Once, 21 of 23 cycles  Dose modification: 320 mg/m2 (original dose 400 mg/m2, Cycle 11)  Administration: 900 mg (7/31/2023), 900 mg (8/14/2023), 900 mg (8/28/2023), 900 mg (9/11/2023), 900 mg (9/25/2023), 900 mg (10/9/2023), 900 mg (10/23/2023), 895 mg (11/6/2023), 895 mg (11/20/2023), 895 mg (12/4/2023), 700 mg (12/18/2023), 680 mg (1/10/2024), 680 mg (1/23/2024), 625 mg (4/23/2024), 625 mg (5/7/2024), 625 mg (5/22/2024), 625 mg (6/4/2024), 625 mg (6/18/2024), 625 mg (7/1/2024), 625 mg (7/16/2024), 625 mg (7/30/2024)  nivolumab (OPDIVO) IVPB, 240 mg (100 % of original dose 240 mg), Intravenous, Once, 13 of 15 cycles  Dose modification: 240 mg (original dose 240 mg, Cycle 9)  Administration: 240 mg (11/20/2023), 240 mg (12/4/2023), 240 mg (12/18/2023), 240 mg (1/10/2024), 240 mg (1/23/2024), 240 mg (4/23/2024), 240 mg (5/7/2024), 240 mg (5/22/2024), 240 mg (6/4/2024), 240 mg (6/18/2024), 240 mg (7/1/2024), 240 mg (7/16/2024), 240 mg (7/30/2024)  leucovorin calcium IVPB, 896 mg, Intravenous, Once, 21 of 23 cycles  Administration: 900 mg (7/31/2023), 900 mg (8/14/2023), 900 mg (8/28/2023), 900 mg (9/11/2023), 900 mg (9/25/2023), 900 mg (10/9/2023), 900 mg (10/23/2023), 900 mg (11/6/2023), 900 mg (11/20/2023), 900 mg (12/4/2023), 900 mg (12/18/2023), 850 mg (1/10/2024), 850 mg (1/23/2024), 800 mg (4/23/2024), 800 mg (5/7/2024), 800 mg (5/22/2024), 800 mg (6/4/2024), 800 mg (6/18/2024), 800 mg (7/1/2024), 800 mg (7/16/2024), 800 mg (7/30/2024)  oxaliplatin (ELOXATIN) chemo infusion, 85 mg/m2 = 190.4 mg, Intravenous, Once, 12 of 12 cycles  Dose modification: 68 mg/m2 (original dose 85 mg/m2, Cycle 11, Reason: Other (Must fill in a comment), Comment: increased fatigue)  Administration: 190.4 mg (7/31/2023), 190.4 mg (8/14/2023), 200 mg (8/28/2023), 200 mg (9/11/2023), 200 mg (9/25/2023), 200 mg (10/9/2023), 200 mg (10/23/2023), 200 mg (11/6/2023), 200 mg (11/20/2023), 190.4 mg (12/4/2023), 150 mg (12/18/2023), 150 mg  (1/10/2024)  fluorouracil (ADRUCIL) ambulatory infusion Soln, 1,200 mg/m2/day = 5,375 mg, Intravenous, Over 46 hours, 21 of 23 cycles     9/26/2023 -  Cancer Staged    Staging form: Colon and Rectum, AJCC 8th Edition  - Pathologic: pT3, pN0, cM1 - Signed by Vickie Israel MD on 4/3/2024  Total positive nodes: 0       3/12/2024 Surgery    A. Terminal ileum, appendix, right colon (right hemicolectomy):  - Adenocarcinoma (5.2cm)  - Forty-nine (49) lymph nodes negative for carcinoma (0/49)    - Acellular mucin present in one (1) lymph node   - See staging synoptic (ypT3N0)     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HCC)   7/27/2023 Initial Diagnosis    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HCC)     7/27/2023 -  Cancer Staged    Staging form: Pharynx - Oropharynx, AJCC 8th Edition  - Clinical stage from 7/27/2023: Stage III (cT1, cN3, cM0, p16+) - Signed by Evan Plummer MD on 7/27/2023  Stage prefix: Initial diagnosis       9/17/2024 -  Chemotherapy    alteplase (CATHFLO), 2 mg, Intracatheter, Every 1 Minute as needed, 3 of 7 cycles  CISplatin (PLATINOL) infusion, 70 mg (original dose 40 mg/m2), Intravenous, Once, 3 of 7 cycles  Dose modification: 70 mg (original dose 40 mg/m2, Cycle 1, Reason: Anticipated Tolerance)  Administration: 70 mg (9/17/2024), 70 mg (9/23/2024)  fosaprepitant (EMEND) IVPB, 150 mg, Intravenous, Once, 3 of 7 cycles  Administration: 150 mg (9/17/2024), 150 mg (9/23/2024)       Past Medical & Surgical Hx:   Patient Active Problem List   Diagnosis    Primary hypertension    Mixed hyperlipidemia    Malignant neoplasm of right female breast (HCC)    Vitamin D deficiency    Prediabetes    Right thyroid nodule    GERD (gastroesophageal reflux disease)    Obesity    Metastatic colon cancer to liver (HCC)    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HCC)    Poor appetite    Nutritional anemia    Dehydration    Drug-induced constipation    Chemotherapy induced neutropenia (HCC)    Stage 3a  chronic kidney disease (HCC)    Thrombocytopenia (HCC)    Neuropathy    Diastolic dysfunction    Hypokalemia    Leukemoid reaction    GOGO (acute kidney injury) (HCC)    Acute post-operative pain    At risk for constipation    At risk for delirium    Advance care planning    Physical deconditioning    History of CVA (cerebrovascular accident)    Chronic nasal congestion    Elevated LFTs    Vitamin B12 deficiency     Past Medical History:   Diagnosis Date    Anemia     Arthritis     Body mass index (BMI) 40.0-44.9, adult (HCC)     Breast cancer (HCC) 12/17/2018    Cancer (HCC)     right breast, colon, liver, right tonsil    Cervical lymphadenopathy     CT neck on 3/30/2023- Large right level 2A lymphadenopathy as described above and suspicious for neoplasm.  Correlation with histopathology is recommended.  Mild asymmetric prominence of the right palatine tonsil with otherwise no definitive nodular enhancing lesions identified along the course of the aerodigestive tract.     5/26/23- FNA of this node was nonrevealing for tissue etiology, but it d    Encounter for screening for HIV 07/07/2022    Follow-up examination 04/04/2023    GERD (gastroesophageal reflux disease)     Hyperlipidemia     Hypertension     Obesity     Stroke (HCC)     TIA     Stroke (HCC)     TIA     Transaminitis 09/22/2021    Vasomotor rhinitis     Refilled flonase today. Stopped taking since January 2022     Past Surgical History:   Procedure Laterality Date    BREAST BIOPSY Right 11/23/2018    us guided bx cancer    BREAST SURGERY      COLONOSCOPY      ESOPHAGOSCOPY N/A 07/20/2023    Procedure: ESOPHAGOSCOPY;  Surgeon: David Chapa MD;  [facility];  Service: ENT    HEMICOLOECTOMY W/ ANASTOMOSIS Right 3/12/2024    Procedure: RIGHT COLECTOMY WITH ROBOTICS;  Surgeon: Vickie Israel MD;  [facility];  Service: Surgical Oncology    IR BIOPSY LIVER MASS  06/27/2023    IR CRYOABLATION  6/3/2024    IR PORT CHECK  01/03/2024    IR  PORT PLACEMENT  07/28/2023    IR PORT STRIPPING  01/09/2024    LAPAROTOMY N/A 3/12/2024    Procedure: LAPAROTOMY EXPLORATORY W/ ROBOTICS;  Surgeon: Vickie Israel MD;  [facility]: [facility];  Service: Surgical Oncology    TN [facility] INCL FLUOR GDNCE DX W/CELL WASHG SPX N/A 07/20/2023    Procedure: BRONCHOSCOPY;  Surgeon: David Chapa MD;  [facility];  Service: ENT    TN LARYNGOSCOPY W/BIOPSY MICROSCOPE/TELESCOPE N/A 07/20/2023    Procedure: MICRODIRECT LARYNGOSCOPY WITH BIOPSY;  Surgeon: David Chapa MD;  [facility]: [facility];  Service: ENT    TN MASTECTOMY PARTIAL W/AXILLARY LYMPHADENECTOMY Right 12/20/2018    Procedure: ULTRASOUND LOCALIZED PARTIAL MASTECTOMY W/SENTINEL NODE BIOPSY POSS AXILLARY DISSECTION;  Surgeon: Zahida Coronado MD;  Location: [facility];  Service: General    TUBAL LIGATION      US GUIDED BREAST BIOPSY RIGHT COMPLETE Right 11/23/2018    US GUIDED INJECTION FOR RESEARCH STUDY  12/20/2018    US GUIDED INJECTION FOR RESEARCH STUDY  12/07/2018    US GUIDED INJECTION FOR RESEARCH STUDY  05/03/2019    US GUIDED LYMPH NODE BIOPSY RIGHT  05/26/2023       Review of Medications:   Vitamins, Supplements and Herbals: No, pt denies taking supplements    Current Outpatient Medications:     anastrozole (ARIMIDEX) 1 mg tablet, Take 1 tablet (1 mg total) by mouth daily, Disp: 90 tablet, Rfl: 3    atorvastatin (LIPITOR) 40 mg tablet, Take 1 tablet (40 mg total) by mouth daily at bedtime, Disp: 30 tablet, Rfl: 2    docusate sodium (COLACE) 50 mg capsule, Take 1 capsule (50 mg total) by mouth 2 (two) times a day, Disp: 90 capsule, Rfl: 1    ergocalciferol (VITAMIN D2) 50,000 units, Take 1 capsule (50,000 Units total) by mouth once a week, Disp: 90 capsule, Rfl: 3    ferrous sulfate 325 (65 Fe) mg tablet, Take 1 tablet (325 mg total) by mouth daily with breakfast (Patient not taking: Reported on 7/12/2024), Disp: 30 tablet, Rfl: 0    folic acid (FOLVITE) 1 mg tablet, Take 1 tablet (1 mg  total) by mouth in the morning, Disp: 90 tablet, Rfl: 3    gabapentin (NEURONTIN) 300 mg capsule, Take 1 pills in the morning, 1 pill at lunch and 2 pills before bed, Disp: 120 capsule, Rfl: 3    hydrocortisone 1 % ointment, , Disp: , Rfl:     lidocaine-prilocaine (EMLA) cream, Apply topically as needed for mild pain, Disp: 30 g, Rfl: 3    losartan (COZAAR) 50 mg tablet, Take 1 tablet (50 mg total) by mouth daily, Disp: 90 tablet, Rfl: 5    mirtazapine (REMERON) 15 mg tablet, Take 1 tablet (15 mg total) by mouth daily at bedtime Patient is also on a 30 mg tablet, for a total dose of 45 mg, Disp: 30 tablet, Rfl: 3    mirtazapine (REMERON) 30 mg tablet, Take 1 tablet (30 mg total) by mouth daily at bedtime, Disp: 30 tablet, Rfl: 3    omeprazole (PriLOSEC) 20 mg delayed release capsule, Take 1 capsule (20 mg total) by mouth daily, Disp: 30 capsule, Rfl: 5    ondansetron (ZOFRAN) 8 mg tablet, Take 1 tablet (8 mg total) by mouth every 8 (eight) hours as needed for nausea or vomiting, Disp: 90 tablet, Rfl: 2    potassium chloride (Klor-Con M20) 20 mEq tablet, Take 1 tablet (20 mEq total) by mouth 2 (two) times a day, Disp: 90 tablet, Rfl: 1    prochlorperazine (COMPAZINE) 10 mg tablet, Take 1 tablet (10 mg total) by mouth every 6 (six) hours as needed for nausea or vomiting, Disp: 90 tablet, Rfl: 2    pyridoxine (VITAMIN B6) 50 mg tablet, Take 1 tablet (50 mg total) by mouth daily, Disp: 90 tablet, Rfl: 3    vitamin B-12 (VITAMIN B-12) 1,000 mcg tablet, , Disp: , Rfl:   No current facility-administered medications for this visit.    Facility-Administered Medications Ordered in Other Visits:     alteplase (CATHFLO) injection 2 mg, 2 mg, Intracatheter, Q1MIN PRN, Harika Agostino, DO    CISplatin (PLATINOL) 70 mg in sodium chloride 0.9 % 250 mL infusion, 70 mg, Intravenous, Once, Harika Agostino, DO    sodium chloride 0.9 % bolus 500 mL, 500 mL, Intravenous, Once, Harika Medina DO, Last Rate: 500 mL/hr at 09/30/24 1210,  "500 mL at 09/30/24 1210    sodium chloride 0.9 % bolus 500 mL, 500 mL, Intravenous, Once, Harika Medina DO    Most Recent Lab Results:   Lab Results   Component Value Date    WBC 5.57 09/27/2024    NEUTROABS 4.03 09/27/2024    IRON 55 05/07/2024    TIBC 292 05/07/2024    FERRITIN 145 05/07/2024    CHOLESTEROL 167 04/24/2024    TRIG 137 04/24/2024    HDL 43 (L) 04/24/2024    LDLCALC 97 04/24/2024    ALT 14 09/27/2024    AST 24 09/27/2024    ALB 3.4 (L) 09/27/2024    SODIUM 138 09/27/2024    SODIUM 138 09/20/2024    K 4.1 09/27/2024    K 3.8 09/20/2024     09/27/2024    BUN 22 09/27/2024    BUN 16 09/20/2024    CREATININE 1.15 09/27/2024    CREATININE 0.75 09/20/2024    EGFR 49 09/27/2024    PHOS 1.7 (L) 03/15/2024    PHOS 2.4 03/14/2024    TSH 1.58 01/03/2022    GLUCOSE 209 (H) 03/12/2024    POCGLU 101 08/05/2024    GLUF 95 09/13/2024    GLUF 101 (H) 06/03/2024    GLUC 102 09/27/2024    HGBA1C 5.5 02/21/2024    HGBA1C 5.9 (H) 06/13/2023    HGBA1C 6.1 (H) 07/09/2022    CALCIUM 8.9 09/27/2024    FOLATE >20.0 (H) 05/23/2019    MG 1.7 (L) 09/27/2024       Anthropometric Measurements:   Height: 66\"  Ht Readings from Last 1 Encounters:   09/30/24 5' 5.98\" (1.676 m)     Wt Readings from Last 20 Encounters:   09/30/24 88 kg (194 lb 0.1 oz)   09/23/24 88.2 kg (194 lb 7.1 oz)   09/17/24 90 kg (198 lb 6.6 oz)   09/13/24 88.5 kg (195 lb)   09/10/24 89.8 kg (198 lb)   09/03/24 89.8 kg (198 lb)   08/20/24 92.2 kg (203 lb 3.2 oz)   08/16/24 90.3 kg (199 lb)   08/06/24 88.9 kg (196 lb)   07/30/24 87.8 kg (193 lb 9 oz)   07/16/24 89 kg (196 lb 3.4 oz)   07/12/24 87.7 kg (193 lb 6.4 oz)   07/03/24 87.5 kg (193 lb)   07/01/24 86.8 kg (191 lb 5.8 oz)   06/28/24 87.4 kg (192 lb 9.6 oz)   06/18/24 89.4 kg (197 lb 1.5 oz)   06/06/24 89.1 kg (196 lb 6.9 oz)   06/04/24 87 kg (191 lb 12.8 oz)   06/03/24 83.9 kg (185 lb)   05/22/24 86.6 kg (190 lb 14.7 oz)       Weight History:   Usual Weight: 275#  Varian: (2/22/19) 264.6#, (4/2/19) " 263.4, (9/16/24) 197.4#, (9/23/24) 194#, (9/30/24) 192.8#  Home Scale: n/a    Oncology Nutrition-Anthropometrics      Flowsheet Row Nutrition from 9/30/2024 in [facility] Nutrition from 9/23/2024 in [facility]   Patient [age] (years): 70 years 69 years   Patient (female) height (in): 66 in 66 in   Current Weight to be used for anthropometric calculations (lbs) 194.1 lbs 194.4 lbs   Current Weight to be used for anthropometric calculations (kg) 88.2 kg 88.4 kg   BMI: 31.3 31.4   IBW female: 130 lbs 130 lbs   IBW (kg) female: 59.1 kg 59.1 kg   IBW % (female) 149.3 % 149.5 %   Adjusted BW (female): 146 lbs 146.1 lbs   Adjusted BW kg (female): 66.4 kg 66.4 kg   % weight change after 1 week: -0.2 % -2 %   Weight change after 1 week (lbs) -0.4 lbs -4 lbs   % weight change after 1 month: -2 % -4.3 %   Weight change after 1 month (lbs) -3.9 lbs -8.8 lbs   % weight change after 3 months: 0.8 % 0.9 %   Weight change after 3 months (lbs) 1.5 lbs 1.8 lbs            Nutrition-Focused Physical Findings: none observed    Food/Nutrition-Related History & Client/Social History:    Current Nutrition Impact Symptoms:  [] Nausea  [x] Reduced Appetite  [] Acid Reflux    [] Vomiting  [x] Unintended Wt Loss [] Malabsorption    [] Diarrhea  [] Unintended Wt Gain  [] Dumping Syndrome    [] Constipation  [] Thick Mucous/Secretions  [] Abdominal Pain    [] Dysgeusia (Altered Taste)  [x] Xerostomia (Dry Mouth)  [] Gas    [] Dysosmia (Altered Smell)  [] Thrush  [] Difficulty Chewing    [] Oral Mucositis (Sore Mouth)  [x] Fatigue  [] Hyperglycemia   Lab Results   Component Value Date    HGBA1C 5.5 02/21/2024      [x] Odynophagia  [] Esophagitis  [] Other:    [x] Dysphagia  [] Early Satiety  [] No Problems Eating      Food Allergies & Intolerances: no    Current Diet: Soft/Moist. Chewing food well before swallowing  Current Nutrition Intake: Decreased since last  visit  Appetite: Fair   Nutrition Route: PO  Oral Care: brushes daily  Activity level: ADLs    24 Hr Diet Recall:   Breakfast: nothing  Snack: nothing  Lunch: Ensure complete (during infusion)  Snack: nothing  Dinner: nothing   Snack: nothing    Beverages: water (16.9oz x 2), decaf coffee (8oz x3), Ensure (10oz x1-2)  Supplements:   Ensure Complete (10 oz, 350 kcal, 30 g pro) 1-2 times daily      Oncology Nutrition-Estimated Needs      Flowsheet Row Nutrition from 2024 in [facility] Nutrition from 2024 in [facility]   Weight type used Adjusted weight Actual weight   Weight in kilograms (kg) used for estimated needs 66.4 kg 90 kg   Energy needs formula:  30-35 kcal/kg 30-35 kcal/kg   Energy needs based on 30 kcal/k kcal 2700 kcal   Energy needs based on 35 kcal/k kcal 3150 kcal   Protein needs formula: 1.2-1.5 g/kg 1.2-1.5 g/kg   Protein needs based on 1.2 g/k g 108 g   Protein needs based on 1.5 g/kg 100 g 135 g   Fluid needs formula: 30-35 mL/kg 30-35 mL/kg   Fluid needs based on 30 mL/kg 1992 mL 2700 mL   Fluid needs in ounces 67 oz 91 oz   Fluid needs based on 35 mL/kg 2324 mL 3150 mL   Fluid needs in ounces 79 oz 106 oz             Discussion & Intervention:   Maira was evaluated today for an RD follow up regarding HNC.  Maira is planned to undergo tx for HNC .  Met with Maira today during her infusion. She reports not eating much over the last few days d/t pain with swallowing and trouble getting food down. She has been able to drink Ensure shakes. Encouraged her to increase consumption of these this week leading up to PEG placement, which is scheduled for 10/2. Discussed TF plan with Maira. She will need TF to meet 100% of her nutrition needs since PO intake has decreased. She asked if she should still drink Ensure and water once PEG is placed. Discussed she can continue sipping on liquids  if tolerated but her TF + water flushes can provide 100% of her calorie and fluid needs. Her weight is down a few pounds over the last month. Stable over the last week.   Reviewed 24 hour recall, which revealed an inadequate po intake, and discussed ways to increase kcal, protein, and fluid intakes and optimize nutrient intake.  Also reviewed the importance of wt management throughout the tx process and the role of a high kcal/ high protein diet and enteral nutrition in managing wt and overall health.  Based on today's assessment, discussion included: MNT for:  xerostomia, odynophagia, dysphagia, enteral nutrition, how to modify foods for anticipated nutrition impact symptoms pt may experience during CA tx, practicing proper oral care, a high kcal/protein diet & food choices to include at all meals & snacks (Examples of high kcal foods: cheese, full-fat dairy products, nut butter, plant-based fats, coconut oil/milk, avocado, butter, cream soup, etc. Examples of high protein foods: eggs, chicken, fish, beans/legumes, nuts/nut butters, bone broth, etc.) , fortifying foods for added kcal and protein (examples include: adding cheese to foods such as eggs, mashed potatoes, casseroles, etc.; Making oatmeal with whole milk rather than water; Making fortified mashed potatoes with cream, butter, dry milk powder, plain Greek yogurt, and cheese.), adequate hydration & fluid choices, sipping on calorie containing beverages (examples include: adding whole milk or cream to coffee, oral nutrition supplements, juice, electrolyte replacement beverages, milk, etc.), eating smaller more frequent meals every 2-3 hours (5-6 small meals/day), eating when feeling most hungry, increasing oral nutrition supplements, and adding extra fat to foods while cooking such as butter, plant-based oil, coconut oil/milk, avocado, nut butters, etc..   Moving forward, Maira was encouraged to increase kcal, protein, and fluid intakes via PO intake as  able, and via PEG once placed   Materials Provided: not applicable  All questions and concerns addressed during today’s visit.  Maira has RD contact information.    Nutrition Diagnosis:   Inadequate Energy Intake related to physiological causes, disease state and treatment related issues as evidenced by food recall, wt loss and discussion with pt and/or family.  Increased Nutrient Needs (kcal & pro) related to increased demand for nutrients and disease state as evidenced by cancer dx and pt undergoing tx for cancer.  Monitoring & Evaluation:   Goals:  weight maintenance/stabilization  adequate nutrition impact symptom management  pt to meet >/=75% estimated nutrition needs daily    Progress Towards Goals: Progressing    Nutrition Rx & Recommendations:  Diet: high calorie/high protein, soft diet as able. Enteral nutrition as 1' source of nutrition once PEG placed  Nutrition Supplements: increased Ensure Complete shakes to 4-5 daily until PEG is placed.  May use homemade shakes/smoothies as desired.  If using a pre-made shake/bar, choose ones with >300 calories and >10 grams protein (ex. Ensure Complete, Ensure Enlive, Ensure Plus, Boost Plus, Boost Very High Calorie, etc.).  Small, frequent meals/snacks may be easier to tolerate than 3 large daily meals.  Aim for 5-6 small meals per day.  Include protein at all meals/snacks.  Include a variety of foods (as tolerated/allowed by diet).  Stay hydrated by sipping fluids of choice/tolerance throughout the day.    Liquid nutrition may be better tolerated than solids at times.  Alter food choices and eating patterns to accommodate changing needs.  Refer to Eating Hints book for other meal/snack ideas and symptom management.  Avoid spicy, acidic, sharp/hard/crunchy foods & carbonated beverages as needed.  Follow proper oral care; Try baking soda/salt water rinse recipe (mix 3/4 tsp salt + 1 tsp baking soda + 1 qt water; rinse with plain water after using) in Eating Hints  book (pg 18).  Brush your teeth before/after meals & before bed.  For sore mouth/throat: choose foods that are easy to chew/swallow; cook foods until they are soft/tender; moisten & soften foods (gravy/sauce/broth/yogurt); cut food into small pieces; drink with a straw (as tolerated); eat with a very small spoon; eat cold or room temperature food; suck on ice chips; Avoid citrus, spicy foods, tomatoes/ketchup, salty foods, raw vegetables, sharp/crunchy/hard foods & alcohol (see pages 23-28 in your Eating Hints book).  For trouble swallowing: eat 5-6 small meals/day; choose foods that are easy to swallow; choose foods that are high in protein & calories; cook foods until they are soft/tender; cut food into small pieces; moisten & soften foods (gravy/sauce/broth/yogurt); sip drinks through a straw (as tolerated); avoid foods/drinks that can burn/scrape your throat (hot temperatures, spicy foods, acidic foods, sharp/hard/crunchy foods, alcohol); talk to your doctor about a Speech Therapy referral for a swallowing evaluation (see page 26-28 in your Eating Hints book).  For dry mouth: sip water throughout the day; try very sweet drinks; chew gum or suck on hard candy, popsicles, & ice chips; eat easy to swallow foods (such as pureed cooked foods or soups); moisten food with sauce/gravy/salad dressing; do not drink beer, wine, or any type of alcohol; keep lips moist with lip balm; avoid tobacco products & second-hand smoke; try Biotene as needed (see pages 17-18 in your Eating Hints book).  Follow proper oral care; Try baking soda/salt water rinse recipe (mix 3/4 tsp salt + 1 tsp baking soda + 1 qt water; rinse with pain water after using) in Eating Hints book (pg 18).  Brush your teeth before/after meals & before bed.  Weigh yourself regularly. If you notice weight loss, make an effort to increase your daily food/calorie intake. If you continue to notice loss after these efforts, reach out to your dietitian to establish  a plan to stabilize weight.   Soft/easy to swallow foods that are high in calories and protein: casseroles, chicken/egg/tuna salad with extra garcia to add calories and moisture, oatmeal/cream of wheat made with whole milk, cottage cheese, whole milk Greek yogurt, scrambled eggs with cheese, avocado, macaroni and cheese, mashed potatoes made with butter and whole milk or dry milk powder, ground meat with extra sauce/gravy, canned fruit, peanut butter, cream soups (cream of chicken, cream of mushroom, broccoli cheddar), pudding made with whole milk, custard, ice cream, banana, milkshakes/smoothies, Review page 47 of Eating Hints Book for more ideas.   Switch to whole milk for more calories  Choose liquids with calories: whole milk, Fairlife milk (higher protein/lactose-free milk), chocolate milk, drinkable yogurt, kefir, 100% fruit juice, diluted juice, bone broth (higher protein broth), creamy soups, sports drinks (Gatorade, Poweraide, Pedialyte, etc.), Italian ice, popsicles, milkshakes, smoothies, oral nutrition supplements (Ensure, Boost, etc.), gelatin/Jello, etc.  TF plan once PEG is placed:  TF Goal: 1 container (237 mL) 5 times per day + 1.5 containers (355 mL) once/day of Jevity 1.5 via PEG (push syringe or gravity syringe method to administer boluses; 1 container to infuse over ~15-20 minutes). (Total of 6.5 cartons/day)  Water Flushin mL free water flush pre/post each bolus (720 mL water)   Enteral Nutrition goal to provide: 1540 mL volume (6.5 containers/day), 2310 kcal, 98 grams protein, 1171 mL free water, 1891 mL total water daily.  Allow for substitutions  Your syringes are each 60 mL or 2 fl oz.  Always flush your feeding tube with 60 mL room-temp water 1-2 times daily while feeding tube is not in use.  Call [facility] when you have 10 days left of TF supplies: [phone], option 3 (customer support).      Follow Up Plan:  10/7/24 during infusion  Recommend Referral to Other Providers: none  at this time

## 2024-10-01 ENCOUNTER — APPOINTMENT (OUTPATIENT)
Dept: RADIATION ONCOLOGY | Facility: CLINIC | Age: 69
End: 2024-10-01
Attending: RADIOLOGY
Payer: MEDICARE

## 2024-10-01 ENCOUNTER — APPOINTMENT (OUTPATIENT)
Dept: RADIATION ONCOLOGY | Facility: CLINIC | Age: 69
End: 2024-10-01
Attending: INTERNAL MEDICINE
Payer: MEDICARE

## 2024-10-01 ENCOUNTER — OFFICE VISIT (OUTPATIENT)
Dept: HEMATOLOGY ONCOLOGY | Facility: CLINIC | Age: 69
End: 2024-10-01
Payer: MEDICARE

## 2024-10-01 VITALS
HEIGHT: 66 IN | HEART RATE: 71 BPM | SYSTOLIC BLOOD PRESSURE: 122 MMHG | WEIGHT: 197 LBS | BODY MASS INDEX: 31.66 KG/M2 | RESPIRATION RATE: 17 BRPM | TEMPERATURE: 97.8 F | DIASTOLIC BLOOD PRESSURE: 82 MMHG | OXYGEN SATURATION: 96 %

## 2024-10-01 DIAGNOSIS — C09.9 RIGHT TONSILLAR SQUAMOUS CELL CARCINOMA (HCC): Primary | ICD-10-CM

## 2024-10-01 PROCEDURE — G2211 COMPLEX E/M VISIT ADD ON: HCPCS | Performed by: INTERNAL MEDICINE

## 2024-10-01 PROCEDURE — 77386 HB NTSTY MODUL RAD TX DLVR CPLX: CPT | Performed by: RADIOLOGY

## 2024-10-01 PROCEDURE — 77427 RADIATION TX MANAGEMENT X5: CPT | Performed by: RADIOLOGY

## 2024-10-01 PROCEDURE — 99214 OFFICE O/P EST MOD 30 MIN: CPT | Performed by: INTERNAL MEDICINE

## 2024-10-01 PROCEDURE — 77014 CHG CT GUIDANCE RADIATION THERAPY FLDS PLACEMENT: CPT | Performed by: RADIOLOGY

## 2024-10-01 RX ORDER — MAGNESIUM SULFATE HEPTAHYDRATE 40 MG/ML
2 INJECTION, SOLUTION INTRAVENOUS ONCE
Start: 2024-10-28 | End: 2024-10-28

## 2024-10-01 RX ORDER — SODIUM CHLORIDE 9 MG/ML
20 INJECTION, SOLUTION INTRAVENOUS ONCE
OUTPATIENT
Start: 2024-10-14

## 2024-10-01 RX ORDER — MAGNESIUM SULFATE HEPTAHYDRATE 40 MG/ML
2 INJECTION, SOLUTION INTRAVENOUS ONCE
Status: CANCELLED
Start: 2024-10-07 | End: 2024-10-07

## 2024-10-01 RX ORDER — SODIUM CHLORIDE 9 MG/ML
20 INJECTION, SOLUTION INTRAVENOUS ONCE
OUTPATIENT
Start: 2024-10-21

## 2024-10-01 RX ORDER — SODIUM CHLORIDE 9 MG/ML
20 INJECTION, SOLUTION INTRAVENOUS ONCE
OUTPATIENT
Start: 2024-10-28

## 2024-10-01 RX ORDER — SODIUM CHLORIDE 9 MG/ML
20 INJECTION, SOLUTION INTRAVENOUS ONCE
Status: CANCELLED | OUTPATIENT
Start: 2024-10-07

## 2024-10-01 RX ORDER — MAGNESIUM SULFATE HEPTAHYDRATE 40 MG/ML
2 INJECTION, SOLUTION INTRAVENOUS ONCE
Start: 2024-10-14 | End: 2024-10-14

## 2024-10-01 RX ORDER — MAGNESIUM SULFATE HEPTAHYDRATE 40 MG/ML
2 INJECTION, SOLUTION INTRAVENOUS ONCE
Start: 2024-10-21 | End: 2024-10-21

## 2024-10-01 NOTE — PROGRESS NOTES
Medical Oncology: Progress Note     Primary Care Provider: Fanta Turner MD       MRN: [id number] : 1955    Reason for Encounter: Follow-Up Visit.  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 On evaluation this morning, she states that she is tolerating concurrent chemoradiation fairly well. Upon further questioning, it appears that peripheral-neuropathy involving her bilateral upper and lower extremities has progressed; and is impacting her ability to perform fine motor movements, as well as to ambulate (e.g. Patient reports dropping objects, as well as endorses some difficulty ambulating). She has not sustained any mechanical-falls, thus far. She does not ambulate with an assistive-device. Patient describes a combination of numbness, as well as neuropathic-pain; for which she is taking Gabapentin. Otherwise, patient states that she continues to tolerate oral-intake appropriately, without nausea, or vomiting. She notes mild diarrhea within the first 24-hours of chemotherapy; of which spontaneously resolves without intervention. Patient has developed radiation-induced pharyngitis; but was prescribed Magic Mouthwash, so she will be trialing the above-mentioned. Patient has no further complaints or concerns at this time.    ECOG Performance Status: 1-Point.    Review of Systems:  All systems reviewed and negative except otherwise listed in the History of Present Illness.    Oncology History   Malignant neoplasm of  "right female breast (HCC)   11/23/2018 Initial Diagnosis    Malignant neoplasm of right female breast (HCC)     11/23/2018 Biopsy    Rt Breast US BX 1100 8cmfn, 4 passes 12g Marsheliageovanna:  - Invasive breast carcinoma of no special type (ductal NST/invasive ductal carcinoma).  - grade 2  - %  - CO 95%  - HER2 negative     12/20/2018 Surgery    Right partial mastectomy and SLN biopsy:  Infiltrating ductal carcinoma, Grade 2/3, felt to be between 2.0 cm and 2.5 cm in greatest dimension  - No invasive tumor is seen at inked margins, but there is a focus of DCIS seen within approximately 1mm of the inked medial margin  - no LVI  - no LCIS  - 0/2 LN's  at least Stage IIA - pT2, pN0, cM0, G2.    - Dr Coronado     12/20/2018 -  Cancer Staged    Stage IIA - pT2, pN0, cM0, G2.       1/4/2019 Genomic Testing    Oncotype DX score: 13     2/1/2019 -  Hormone Therapy    anastrozole 1 mg daily as adjuvant endocrine therapy    - Dr Daley       2/14/2019 - 4/3/2019 Radiation    Course: C1    Plan ID Energy Fractions Dose per Fraction (cGy) Dose Correction (cGy) Total Dose Delivered (cGy) Elapsed Days   R Breast 10X/6X 25 / 25 200 0 5,000 40   R BRST BOOST 16E 5 / 5 200 0 1,000 7      Treatment dates:  C1: 2/14/2019 - 4/3/2019       Metastatic colon cancer to liver (HCC)   7/6/2023 Genetic Testing    CARIS Results:   KRAS Pathogenic Variant Exon 2  p.G12D : lack of benefit of cetuximab, panitumumab Level 2   No other actionable mutation; MSI stable; TMB low   MiFOLOXAi Results: \"Decreased Benefit of FOLFOX + Bevacizumab in first line metastatic CRC.  This patient may achieve improved results by receiving an alternative to FOLFOX, such as FOLFIRI, as their initial regimen. As an adjustment to frontline FOLFOXIRI following toxicity: This patient may achieve improved results by removing the oxaliplatin portion of their regimen\".         7/11/2023 Initial Diagnosis    Metastatic colon cancer to liver (HCC)     7/13/2023 -  Cancer " Staged    Staging form: Colon and Rectum, AJCC 8th Edition  - Clinical stage from 7/13/2023: cT3, cN0, pM1 - Signed by Harika Medina DO on 9/28/2023  Total positive nodes: 0       7/31/2023 - 8/1/2024 Chemotherapy    cyanocobalamin, 1,000 mcg, Intramuscular, Every 30 days, 7 of 9 cycles  Administration: 1,000 mcg (5/9/2024), 1,000 mcg (5/24/2024), 1,000 mcg (6/20/2024), 1,000 mcg (7/3/2024), 1,000 mcg (7/18/2024), 1,000 mcg (8/1/2024)  potassium chloride, 20 mEq, Intravenous, Once, 2 of 2 cycles  Administration: 20 mEq (11/6/2023), 20 mEq (11/20/2023)  alteplase (CATHFLO), 2 mg, Intracatheter, Every 1 Minute as needed, 21 of 23 cycles  Administration: 2 mg (1/3/2024)  pegfilgrastim (NEULASTA), 6 mg, Subcutaneous, Once, 12 of 12 cycles  Administration: 6 mg (10/25/2023), 6 mg (11/8/2023), 6 mg (11/22/2023), 6 mg (12/6/2023), 6 mg (12/20/2023), 6 mg (1/12/2024), 6 mg (1/25/2024), 6 mg (4/25/2024), 6 mg (5/9/2024), 6 mg (5/24/2024), 6 mg (6/20/2024)  fluorouracil (ADRUCIL), 895 mg, Intravenous, Once, 21 of 23 cycles  Dose modification: 320 mg/m2 (original dose 400 mg/m2, Cycle 11)  Administration: 900 mg (7/31/2023), 900 mg (8/14/2023), 900 mg (8/28/2023), 900 mg (9/11/2023), 900 mg (9/25/2023), 900 mg (10/9/2023), 900 mg (10/23/2023), 895 mg (11/6/2023), 895 mg (11/20/2023), 895 mg (12/4/2023), 700 mg (12/18/2023), 680 mg (1/10/2024), 680 mg (1/23/2024), 625 mg (4/23/2024), 625 mg (5/7/2024), 625 mg (5/22/2024), 625 mg (6/4/2024), 625 mg (6/18/2024), 625 mg (7/1/2024), 625 mg (7/16/2024), 625 mg (7/30/2024)  nivolumab (OPDIVO) IVPB, 240 mg (100 % of original dose 240 mg), Intravenous, Once, 13 of 15 cycles  Dose modification: 240 mg (original dose 240 mg, Cycle 9)  Administration: 240 mg (11/20/2023), 240 mg (12/4/2023), 240 mg (12/18/2023), 240 mg (1/10/2024), 240 mg (1/23/2024), 240 mg (4/23/2024), 240 mg (5/7/2024), 240 mg (5/22/2024), 240 mg (6/4/2024), 240 mg (6/18/2024), 240 mg (7/1/2024), 240 mg (7/16/2024),  240 mg (7/30/2024)  leucovorin calcium IVPB, 896 mg, Intravenous, Once, 21 of 23 cycles  Administration: 900 mg (7/31/2023), 900 mg (8/14/2023), 900 mg (8/28/2023), 900 mg (9/11/2023), 900 mg (9/25/2023), 900 mg (10/9/2023), 900 mg (10/23/2023), 900 mg (11/6/2023), 900 mg (11/20/2023), 900 mg (12/4/2023), 900 mg (12/18/2023), 850 mg (1/10/2024), 850 mg (1/23/2024), 800 mg (4/23/2024), 800 mg (5/7/2024), 800 mg (5/22/2024), 800 mg (6/4/2024), 800 mg (6/18/2024), 800 mg (7/1/2024), 800 mg (7/16/2024), 800 mg (7/30/2024)  oxaliplatin (ELOXATIN) chemo infusion, 85 mg/m2 = 190.4 mg, Intravenous, Once, 12 of 12 cycles  Dose modification: 68 mg/m2 (original dose 85 mg/m2, Cycle 11, Reason: Other (Must fill in a comment), Comment: increased fatigue)  Administration: 190.4 mg (7/31/2023), 190.4 mg (8/14/2023), 200 mg (8/28/2023), 200 mg (9/11/2023), 200 mg (9/25/2023), 200 mg (10/9/2023), 200 mg (10/23/2023), 200 mg (11/6/2023), 200 mg (11/20/2023), 190.4 mg (12/4/2023), 150 mg (12/18/2023), 150 mg (1/10/2024)  fluorouracil (ADRUCIL) ambulatory infusion Soln, 1,200 mg/m2/day = 5,375 mg, Intravenous, Over 46 hours, 21 of 23 cycles     9/26/2023 -  Cancer Staged    Staging form: Colon and Rectum, AJCC 8th Edition  - Pathologic: pT3, pN0, cM1 - Signed by Vickie Israel MD on 4/3/2024  Total positive nodes: 0       3/12/2024 Surgery    A. Terminal ileum, appendix, right colon (right hemicolectomy):  - Adenocarcinoma (5.2cm)  - Forty-nine (49) lymph nodes negative for carcinoma (0/49)    - Acellular mucin present in one (1) lymph node   - See staging synoptic (ypT3N0)     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HCC)   7/27/2023 Initial Diagnosis    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HCC)     7/27/2023 -  Cancer Staged    Staging form: Pharynx - Oropharynx, AJCC 8th Edition  - Clinical stage from 7/27/2023: Stage III (cT1, cN3, cM0, p16+) - Signed by Evan Plummer MD on 7/27/2023  Stage prefix: Initial  diagnosis       9/17/2024 -  Chemotherapy    alteplase (CATHFLO), 2 mg, Intracatheter, Every 1 Minute as needed, 3 of 7 cycles  CISplatin (PLATINOL) infusion, 70 mg (original dose 40 mg/m2), Intravenous, Once, 3 of 7 cycles  Dose modification: 70 mg (original dose 40 mg/m2, Cycle 1, Reason: Anticipated Tolerance), 30 mg/m2 (original dose 40 mg/m2, Cycle 4, Reason: Neuropathy, Comment: dose reduction to 30 mg/m2 due to neuropathy)  Administration: 70 mg (9/17/2024), 70 mg (9/23/2024), 70 mg (9/30/2024)  fosaprepitant (EMEND) IVPB, 150 mg, Intravenous, Once, 3 of 7 cycles  Administration: 150 mg (9/17/2024), 150 mg (9/23/2024), 150 mg (9/30/2024)       Problem List:  Patient Active Problem List   Diagnosis    Primary hypertension    Mixed hyperlipidemia    Malignant neoplasm of right female breast (HCC)    Vitamin D deficiency    Prediabetes    Right thyroid nodule    GERD (gastroesophageal reflux disease)    Obesity    Metastatic colon cancer to liver (HCC)    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HCC)    Poor appetite    Nutritional anemia    Dehydration    Drug-induced constipation    Chemotherapy induced neutropenia (HCC)    Stage 3a chronic kidney disease (HCC)    Thrombocytopenia (HCC)    Neuropathy    Diastolic dysfunction    Hypokalemia    Leukemoid reaction    GOGO (acute kidney injury) (HCC)    Acute post-operative pain    At risk for constipation    At risk for delirium    Advance care planning    Physical deconditioning    History of CVA (cerebrovascular accident)    Chronic nasal congestion    Elevated LFTs    Vitamin B12 deficiency     Assessment and Plan:  Patient is a 69-year-old female, with a complex Oncologic history, including synchronous de naveed metastatic adenocarcinoma of the mid-ascending colon (Complicated by partial-obstruction requiring right hemicolectomy on March 15th, 2024) with biopsy-proven oligometastatic disease to the liver (Status-post cryoablation on June 3rd, 2024), and  unresectable (At least locally-advanced versus metastatic) 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 Of note, following PET-CT on August 5th, 2024 showing interval disease progression in the neck, patient's case was presented at the Multidisciplinary Head and Neck Tumor Board on August 12th, 2024, which culminated in consensus for re-evaluation by Otolaryngology and Radiation Oncology. Patient was recommended definitive chemoradiation. She has since initiated concurrent chemoradiation with weekly Cisplatin 40 mg/m2, and has completed three cycles, thus far. Unfortunately, patient reports progressive peripheral neuropathy involving the bilateral upper and lower extremities. Given the above-mentioned, will dose-reduce Cisplatin to 30 mg/m2. Patient is anticipating Gastrostomy-tube placement via Interventional Radiology tomorrow, October 2nd, 2024. Recommend close interval follow-up in approximately 2-weeks, for re-evaluation. Patient expressed understanding and agreement with the above-mentioned.    Summary of Recommendations:  Patient to start Magic Mouthwash, for evolving radiation-induced pharyngitis.  Plan for Gastrostomy-tube placement via Interventional Radiology on October 2nd, 2024.  Given progressive peripheral-neuropathy involving the upper and lower extremities:  Will dose-reduce weekly Cisplatin to 30 mg/m2.  Recommend close interval follow-up in approximately 2-weeks, for re-evaluation.  Note: Patient was provided Ensure meal-supplements at the end of her encounter.    Past Medical History:   has a past medical history of Anemia, Arthritis, Body mass index (BMI) 40.0-44.9, adult (HCC), Breast cancer (HCC) (12/17/2018), Cancer (HCC), Cervical lymphadenopathy, Encounter for screening for HIV (07/07/2022), Follow-up examination (04/04/2023), GERD (gastroesophageal reflux disease), Hyperlipidemia, Hypertension, Obesity, Stroke (HCC), Stroke (HCC),  Transaminitis (09/22/2021), and Vasomotor rhinitis.    Past Surgical History:   has a past surgical history that includes US guided breast biopsy right complete (Right, 11/23/2018); Breast surgery; pr mastectomy partial w/axillary lymphadenectomy (Right, 12/20/2018); Tubal ligation; Breast biopsy (Right, 11/23/2018); US guided injection for research study (12/20/2018); US guided injection for research study (12/07/2018); US guided injection for research study (05/03/2019); US guided lymph node biopsy right (05/26/2023); IR biopsy liver mass (06/27/2023); pr laryngoscopy w/biopsy microscope/telescope (N/A, 07/20/2023); pr [facility] incl fluor gdnce dx w/cell washg spx (N/A, 07/20/2023); ESOPHAGOSCOPY (N/A, 07/20/2023); IR port placement (07/28/2023); IR port check (01/03/2024); IR port stripping (01/09/2024); Colonoscopy; Hemicoloectomy w/ anastomosis (Right, 3/12/2024); LAPAROTOMY (N/A, 3/12/2024); and IR cryoablation (6/3/2024).    Current Medications:  Current Outpatient Medications   Medication Sig Dispense Refill    anastrozole (ARIMIDEX) 1 mg tablet Take 1 tablet (1 mg total) by mouth daily 90 tablet 3    atorvastatin (LIPITOR) 40 mg tablet Take 1 tablet (40 mg total) by mouth daily at bedtime 30 tablet 2    diphenhydramine, lidocaine, Al/Mg hydroxide, simethicone (Magic Mouthwash) SUSP Swish and swallow 10 mL every 4 (four) hours as needed for mouth pain or discomfort 480 mL 1    docusate sodium (COLACE) 50 mg capsule Take 1 capsule (50 mg total) by mouth 2 (two) times a day 90 capsule 1    ergocalciferol (VITAMIN D2) 50,000 units Take 1 capsule (50,000 Units total) by mouth once a week 90 capsule 3    ferrous sulfate 325 (65 Fe) mg tablet Take 1 tablet (325 mg total) by mouth daily with breakfast 30 tablet 0    folic acid (FOLVITE) 1 mg tablet Take 1 tablet (1 mg total) by mouth in the morning 90 tablet 3    gabapentin (NEURONTIN) 300 mg capsule Take 1 pills in the morning, 1 pill at lunch and 2 pills before  bed 120 capsule 3    hydrocortisone 1 % ointment       lidocaine-prilocaine (EMLA) cream Apply topically as needed for mild pain 30 g 3    losartan (COZAAR) 50 mg tablet Take 1 tablet (50 mg total) by mouth daily 90 tablet 5    mirtazapine (REMERON) 15 mg tablet Take 1 tablet (15 mg total) by mouth daily at bedtime Patient is also on a 30 mg tablet, for a total dose of 45 mg 30 tablet 3    mirtazapine (REMERON) 30 mg tablet Take 1 tablet (30 mg total) by mouth daily at bedtime 30 tablet 3    omeprazole (PriLOSEC) 20 mg delayed release capsule Take 1 capsule (20 mg total) by mouth daily 30 capsule 5    ondansetron (ZOFRAN) 8 mg tablet Take 1 tablet (8 mg total) by mouth every 8 (eight) hours as needed for nausea or vomiting 90 tablet 2    potassium chloride (Klor-Con M20) 20 mEq tablet Take 1 tablet (20 mEq total) by mouth 2 (two) times a day 90 tablet 1    prochlorperazine (COMPAZINE) 10 mg tablet Take 1 tablet (10 mg total) by mouth every 6 (six) hours as needed for nausea or vomiting 90 tablet 2    pyridoxine (VITAMIN B6) 50 mg tablet Take 1 tablet (50 mg total) by mouth daily 90 tablet 3    vitamin B-12 (VITAMIN B-12) 1,000 mcg tablet        No current facility-administered medications for this visit.     Facility-Administered Medications Ordered in Other Visits   Medication Dose Route Frequency Provider Last Rate Last Admin    alteplase (CATHFLO) injection 2 mg  2 mg Intracatheter Q1MIN PRN Harika Medina DO         Social History:   reports that she has never smoked. She has never been exposed to tobacco smoke. She has never used smokeless tobacco. She reports that she does not drink alcohol and does not use drugs.     Family History:  family history includes Colon cancer (age of onset: 58) in her mother; Hypertension in her daughter, mother, and son; Pancreatic cancer (age of onset: 60) in her father.     Allergies:  has No Known Allergies.    Objective:  Vitals:    10/01/24 0832   BP: 122/82   Pulse: 71    Resp: 17   Temp: 97.8 °F (36.6 °C)   SpO2: 96%     Physical Exam:  General: Alert, and oriented; Pleasant, and conversational; Well-Appearing Female  HEENT: Atraumatic, and normocephalic; PERRLA; EOMI; Moist mucosal membranes  Cardiovascular: Regular rate and rhythm; No murmurs, rubs, or gallops; No edema  Respiratory: Clear to auscultation bilaterally; Adequate aeration; No supplemental oxygen   Abdomen: Soft, Non-tender; Non-distended; No organomegaly; Bowel sounds present  Extremities: No obvious deformities; No peripheral edema; Moves all  Neurologic: Appropriately alert, and oriented to Person, Place, and Time; No focal neurologic deficits    Labs:  Lab Results   Component Value Date    WBC 5.57 09/27/2024    HGB 9.6 (L) 09/27/2024    HCT 29.7 (L) 09/27/2024    MCV 88 09/27/2024     09/27/2024     Lab Results   Component Value Date    SODIUM 138 09/27/2024    K 4.1 09/27/2024     09/27/2024    CO2 27 09/27/2024    AGAP 4 09/27/2024    BUN 22 09/27/2024    CREATININE 1.15 09/27/2024    GLUC 102 09/27/2024    GLUF 95 09/13/2024    CALCIUM 8.9 09/27/2024    AST 24 09/27/2024    ALT 14 09/27/2024    ALKPHOS 92 09/27/2024    TP 6.8 09/27/2024    TBILI 0.33 09/27/2024    EGFR 49 09/27/2024     Patient was seen and discussed with attending physician, ALAINA Steve D.O.  Hematology-Oncology Fellow (PGY-5)

## 2024-10-02 ENCOUNTER — APPOINTMENT (OUTPATIENT)
Dept: RADIATION ONCOLOGY | Facility: CLINIC | Age: 69
End: 2024-10-02
Attending: INTERNAL MEDICINE
Payer: MEDICARE

## 2024-10-02 ENCOUNTER — ANESTHESIA (OUTPATIENT)
Dept: RADIOLOGY | Facility: HOSPITAL | Age: 69
End: 2024-10-02
Payer: MEDICARE

## 2024-10-02 ENCOUNTER — ANESTHESIA EVENT (OUTPATIENT)
Dept: RADIOLOGY | Facility: HOSPITAL | Age: 69
End: 2024-10-02
Payer: MEDICARE

## 2024-10-02 ENCOUNTER — HOSPITAL ENCOUNTER (OUTPATIENT)
Dept: RADIOLOGY | Facility: HOSPITAL | Age: 69
Discharge: HOME | End: 2024-10-02
Attending: RADIOLOGY
Payer: MEDICARE

## 2024-10-02 VITALS
RESPIRATION RATE: 16 BRPM | BODY MASS INDEX: 31.65 KG/M2 | SYSTOLIC BLOOD PRESSURE: 120 MMHG | WEIGHT: 195.99 LBS | DIASTOLIC BLOOD PRESSURE: 64 MMHG | OXYGEN SATURATION: 100 % | TEMPERATURE: 97 F | HEART RATE: 55 BPM

## 2024-10-02 DIAGNOSIS — C09.9 RIGHT TONSILLAR SQUAMOUS CELL CARCINOMA (HCC): ICD-10-CM

## 2024-10-02 PROCEDURE — C1769 GUIDE WIRE: HCPCS

## 2024-10-02 PROCEDURE — 49440 PLACE GASTROSTOMY TUBE PERC: CPT | Performed by: RADIOLOGY

## 2024-10-02 PROCEDURE — C1887 CATHETER, GUIDING: HCPCS

## 2024-10-02 PROCEDURE — 49440 PLACE GASTROSTOMY TUBE PERC: CPT

## 2024-10-02 RX ORDER — PHENYLEPHRINE HCL IN 0.9% NACL 1 MG/10 ML
SYRINGE (ML) INTRAVENOUS AS NEEDED
Status: DISCONTINUED | OUTPATIENT
Start: 2024-10-02 | End: 2024-10-02

## 2024-10-02 RX ORDER — PROPOFOL 10 MG/ML
INJECTION, EMULSION INTRAVENOUS CONTINUOUS PRN
Status: DISCONTINUED | OUTPATIENT
Start: 2024-10-02 | End: 2024-10-02

## 2024-10-02 RX ORDER — CEFAZOLIN SODIUM 2 G/50ML
2000 SOLUTION INTRAVENOUS ONCE
Status: DISCONTINUED | OUTPATIENT
Start: 2024-10-02 | End: 2024-10-03 | Stop reason: HOSPADM

## 2024-10-02 RX ORDER — ACETAMINOPHEN 325 MG/1
650 TABLET ORAL EVERY 4 HOURS PRN
Status: DISCONTINUED | OUTPATIENT
Start: 2024-10-02 | End: 2024-10-03 | Stop reason: HOSPADM

## 2024-10-02 RX ORDER — MIDAZOLAM HYDROCHLORIDE 2 MG/2ML
INJECTION, SOLUTION INTRAMUSCULAR; INTRAVENOUS AS NEEDED
Status: DISCONTINUED | OUTPATIENT
Start: 2024-10-02 | End: 2024-10-02

## 2024-10-02 RX ORDER — LIDOCAINE WITH 8.4% SOD BICARB 0.9%(10ML)
SYRINGE (ML) INJECTION AS NEEDED
Status: COMPLETED | OUTPATIENT
Start: 2024-10-02 | End: 2024-10-02

## 2024-10-02 RX ORDER — FENTANYL CITRATE 50 UG/ML
INJECTION, SOLUTION INTRAMUSCULAR; INTRAVENOUS AS NEEDED
Status: DISCONTINUED | OUTPATIENT
Start: 2024-10-02 | End: 2024-10-02

## 2024-10-02 RX ORDER — ONDANSETRON 2 MG/ML
INJECTION INTRAMUSCULAR; INTRAVENOUS AS NEEDED
Status: DISCONTINUED | OUTPATIENT
Start: 2024-10-02 | End: 2024-10-02

## 2024-10-02 RX ORDER — CEFAZOLIN SODIUM 1 G/3ML
INJECTION, POWDER, FOR SOLUTION INTRAMUSCULAR; INTRAVENOUS AS NEEDED
Status: COMPLETED | OUTPATIENT
Start: 2024-10-02 | End: 2024-10-02

## 2024-10-02 RX ORDER — PROPOFOL 10 MG/ML
INJECTION, EMULSION INTRAVENOUS AS NEEDED
Status: DISCONTINUED | OUTPATIENT
Start: 2024-10-02 | End: 2024-10-02

## 2024-10-02 RX ORDER — SODIUM CHLORIDE 9 MG/ML
75 INJECTION, SOLUTION INTRAVENOUS CONTINUOUS
Status: DISCONTINUED | OUTPATIENT
Start: 2024-10-02 | End: 2024-10-03 | Stop reason: HOSPADM

## 2024-10-02 RX ADMIN — CEFAZOLIN 2000 MG: 1 INJECTION, POWDER, FOR SOLUTION INTRAMUSCULAR; INTRAVENOUS; PARENTERAL at 10:25

## 2024-10-02 RX ADMIN — SODIUM CHLORIDE 75 ML/HR: 0.9 INJECTION, SOLUTION INTRAVENOUS at 10:09

## 2024-10-02 RX ADMIN — FENTANYL CITRATE 25 MCG: 50 INJECTION INTRAMUSCULAR; INTRAVENOUS at 10:49

## 2024-10-02 RX ADMIN — PROPOFOL 30 MG: 10 INJECTION, EMULSION INTRAVENOUS at 10:43

## 2024-10-02 RX ADMIN — Medication 10 ML: at 11:05

## 2024-10-02 RX ADMIN — IOHEXOL 10 ML: 350 INJECTION, SOLUTION INTRAVENOUS at 11:23

## 2024-10-02 RX ADMIN — FENTANYL CITRATE 25 MCG: 50 INJECTION INTRAMUSCULAR; INTRAVENOUS at 10:46

## 2024-10-02 RX ADMIN — PROPOFOL 80 MCG/KG/MIN: 10 INJECTION, EMULSION INTRAVENOUS at 10:41

## 2024-10-02 RX ADMIN — MIDAZOLAM 1 MG: 1 INJECTION INTRAMUSCULAR; INTRAVENOUS at 10:41

## 2024-10-02 RX ADMIN — ONDANSETRON 4 MG: 2 INJECTION INTRAMUSCULAR; INTRAVENOUS at 10:47

## 2024-10-02 RX ADMIN — MIDAZOLAM 0.5 MG: 1 INJECTION INTRAMUSCULAR; INTRAVENOUS at 10:55

## 2024-10-02 RX ADMIN — GLUCAGON 1 MG: KIT at 11:03

## 2024-10-02 RX ADMIN — ACETAMINOPHEN 650 MG: 325 TABLET ORAL at 13:00

## 2024-10-02 RX ADMIN — Medication 100 MCG: at 10:58

## 2024-10-02 NOTE — ANESTHESIA POSTPROCEDURE EVALUATION
Post-Op Assessment Note    CV Status:  Stable  Pain Score: 0    Pain management: adequate       Mental Status:  Arousable   Hydration Status:  Stable   PONV Controlled:  None   Airway Patency:  Patent     Post Op Vitals Reviewed: Yes    No anethesia notable event occurred.    Staff: CRNA               /57 (10/02/24 1125)    Temp 97.8 °F (36.6 °C) (10/02/24 1125)    Pulse 74 (10/02/24 1125)   Resp 15 (10/02/24 1125)    SpO2   98% 6L FM

## 2024-10-02 NOTE — DISCHARGE INSTRUCTIONS
How to Care for Your G Tube     AMBULATORY CARE:   A  gastrostomy (G) tube is a plastic tube that is put into your stomach through your skin. G tubes are most often used to give you food or liquids if you are not able to eat or drink. Medical formula, medicines, and water can be given through a G tube.    After your procedure you may resume your regular diet. Start with clear liquids and advance as tolerated. Small sips of flat soda will help with nausea. Your tube can be used immediately.    Contact Interventional Radiology at [phone] ([facility] PATIENTS: Contact Interventional Radiology at [phone]) ([facility] PATIENTS: Contact Interventional Radiology at [phone]) if any of the following occur:  You have severe abdominal pain.    You have persistent nausea or vomiting.  Blood or tube feeding fluid leaks from the G tube site.    Your G tube is shorter than it was when it was put in.   Your G tube comes out.    You have discomfort or pain around your PEG tube site.    The skin around your G tube is red, swollen, or draining pus.   Your T tacks do not fall off. T tacks should fall off within four weeks of the peg tube placement.      How to use your G tube: Your healthcare provider will tell you when and how often to use your PEG tube for feedings.     How to care for the skin around your G tube:   Do not remove the T tacks they will fall off in 3 weeks time, they hold your G tube in place when you first get it.  Leave clean bandages over the tube area for the first 24 hours after the tube is put in. You may not need to use bandages after 24 hours if the skin around the tube looks dry. Ask when you can shower or bathe.   Routine skin care:    Clean the skin around your tube 1 to 2 times each day. Ask your healthcare provider what you should use to clean your skin. Check for redness and swelling in the area where the tube goes into your body. Check for fluid draining from your stoma (the hole where the tube  was put in).    Keep the skin around your G tube dry. This will help prevent skin irritation and infection.

## 2024-10-02 NOTE — H&P
Interventional Radiology  History and Physical 10/2/2024     Maira Moura   1955   [id number]    Assessment/Plan:  69 year old female with squamous cell carcinoma of right tonsil presents for gastrostomy tube placement.    Problem List Items Addressed This Visit          Respiratory    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HCC)    Relevant Orders    IR gastrostomy tube placement          Subjective:     Patient ID: Maira Moura is a 69 y.o. female.    History of Present Illness  Patient with squamous cell carcinoma of right tonsil presents for gastrostomy tube placement.    Review of Systems      Past Medical History:   Diagnosis Date    Anemia     Arthritis     Body mass index (BMI) 40.0-44.9, adult (HCC)     Breast cancer (HCC) 12/17/2018    Cancer (HCC)     right breast, colon, liver, right tonsil    Cervical lymphadenopathy     CT neck on 3/30/2023- Large right level 2A lymphadenopathy as described above and suspicious for neoplasm.  Correlation with histopathology is recommended.  Mild asymmetric prominence of the right palatine tonsil with otherwise no definitive nodular enhancing lesions identified along the course of the aerodigestive tract.     5/26/23- FNA of this node was nonrevealing for tissue etiology, but it d    Encounter for screening for HIV 07/07/2022    Follow-up examination 04/04/2023    GERD (gastroesophageal reflux disease)     Hyperlipidemia     Hypertension     Obesity     Stroke (HCC)     TIA     Stroke (HCC)     TIA     Transaminitis 09/22/2021    Vasomotor rhinitis     Refilled flonase today. Stopped taking since January 2022        Past Surgical History:   Procedure Laterality Date    BREAST BIOPSY Right 11/23/2018    us guided bx cancer    BREAST SURGERY      COLONOSCOPY      ESOPHAGOSCOPY N/A 07/20/2023    Procedure: ESOPHAGOSCOPY;  Surgeon: David Chapa MD;  [facility];  Service: ENT    HEMICOLOECTOMY W/ ANASTOMOSIS Right 3/12/2024    Procedure: RIGHT  COLECTOMY WITH ROBOTICS;  Surgeon: Vickie Israel MD;  Location: [facility];  Service: Surgical Oncology    IR BIOPSY LIVER MASS  06/27/2023    IR CRYOABLATION  6/3/2024    IR PORT CHECK  01/03/2024    IR PORT PLACEMENT  07/28/2023    IR PORT STRIPPING  01/09/2024    LAPAROTOMY N/A 3/12/2024    Procedure: LAPAROTOMY EXPLORATORY W/ ROBOTICS;  Surgeon: Vickie Israel MD;  [facility];  Service: Surgical Oncology    [facility] W/CELL WASHG SPX N/A 07/20/2023    Procedure: BRONCHOSCOPY;  Surgeon: David Chapa MD;  [facility];  Service: ENT    OK LARYNGOSCOPY W/BIOPSY MICROSCOPE/TELESCOPE N/A 07/20/2023    Procedure: MICRODIRECT LARYNGOSCOPY WITH BIOPSY;  Surgeon: David Chapa MD;  [facility];  Service: ENT    OK MASTECTOMY PARTIAL W/AXILLARY LYMPHADENECTOMY Right 12/20/2018    Procedure: ULTRASOUND LOCALIZED PARTIAL MASTECTOMY W/SENTINEL NODE BIOPSY POSS AXILLARY DISSECTION;  Surgeon: Zahida Coronado MD;  Location: [facility];  Service: General    TUBAL LIGATION      US GUIDED BREAST BIOPSY RIGHT COMPLETE Right 11/23/2018    US GUIDED INJECTION FOR RESEARCH STUDY  12/20/2018    US GUIDED INJECTION FOR RESEARCH STUDY  12/07/2018    US GUIDED INJECTION FOR RESEARCH STUDY  05/03/2019    US GUIDED LYMPH NODE BIOPSY RIGHT  05/26/2023        Social History     Tobacco Use   Smoking Status Never    Passive exposure: Never   Smokeless Tobacco Never   Tobacco Comments    NO TOBACCO USE        Social History     Substance and Sexual Activity   Alcohol Use Never        Social History     Substance and Sexual Activity   Drug Use Never        No Known Allergies    Current Outpatient Medications   Medication Sig Dispense Refill    anastrozole (ARIMIDEX) 1 mg tablet Take 1 tablet (1 mg total) by mouth daily 90 tablet 3    atorvastatin (LIPITOR) 40 mg tablet Take 1 tablet (40 mg total) by mouth daily at bedtime 30 tablet 2    diphenhydramine, lidocaine, Al/Mg  hydroxide, simethicone (Magic Mouthwash) SUSP Swish and swallow 10 mL every 4 (four) hours as needed for mouth pain or discomfort 480 mL 1    docusate sodium (COLACE) 50 mg capsule Take 1 capsule (50 mg total) by mouth 2 (two) times a day 90 capsule 1    ergocalciferol (VITAMIN D2) 50,000 units Take 1 capsule (50,000 Units total) by mouth once a week 90 capsule 3    ferrous sulfate 325 (65 Fe) mg tablet Take 1 tablet (325 mg total) by mouth daily with breakfast 30 tablet 0    folic acid (FOLVITE) 1 mg tablet Take 1 tablet (1 mg total) by mouth in the morning 90 tablet 3    gabapentin (NEURONTIN) 300 mg capsule Take 1 pills in the morning, 1 pill at lunch and 2 pills before bed 120 capsule 3    hydrocortisone 1 % ointment       lidocaine-prilocaine (EMLA) cream Apply topically as needed for mild pain 30 g 3    losartan (COZAAR) 50 mg tablet Take 1 tablet (50 mg total) by mouth daily 90 tablet 5    mirtazapine (REMERON) 15 mg tablet Take 1 tablet (15 mg total) by mouth daily at bedtime Patient is also on a 30 mg tablet, for a total dose of 45 mg 30 tablet 3    mirtazapine (REMERON) 30 mg tablet Take 1 tablet (30 mg total) by mouth daily at bedtime 30 tablet 3    omeprazole (PriLOSEC) 20 mg delayed release capsule Take 1 capsule (20 mg total) by mouth daily 30 capsule 5    ondansetron (ZOFRAN) 8 mg tablet Take 1 tablet (8 mg total) by mouth every 8 (eight) hours as needed for nausea or vomiting 90 tablet 2    potassium chloride (Klor-Con M20) 20 mEq tablet Take 1 tablet (20 mEq total) by mouth 2 (two) times a day 90 tablet 1    prochlorperazine (COMPAZINE) 10 mg tablet Take 1 tablet (10 mg total) by mouth every 6 (six) hours as needed for nausea or vomiting 90 tablet 2    pyridoxine (VITAMIN B6) 50 mg tablet Take 1 tablet (50 mg total) by mouth daily 90 tablet 3    vitamin B-12 (VITAMIN B-12) 1,000 mcg tablet        Current Facility-Administered Medications   Medication Dose Route Frequency Provider Last Rate Last  Admin    ceFAZolin (ANCEF) IVPB (premix in dextrose) 2,000 mg 50 mL  2,000 mg Intravenous Once Cherri Gerard MD        sodium chloride 0.9 % infusion  75 mL/hr Intravenous Continuous Cherri Gerard MD         Facility-Administered Medications Ordered in Other Encounters   Medication Dose Route Frequency Provider Last Rate Last Admin    alteplase (CATHFLO) injection 2 mg  2 mg Intracatheter Q1MIN PRN Harika Medina DO              Objective:    Vitals:    10/02/24 0954 10/02/24 1000   BP:  127/59   Pulse:  56   Resp:  16   Temp:  97.9 °F (36.6 °C)   TempSrc:  Temporal   SpO2:  94%   Weight: 88.9 kg (195 lb 15.8 oz)         Physical Exam  Constitutional:       Appearance: Normal appearance.   Cardiovascular:      Rate and Rhythm: Bradycardia present.   Pulmonary:      Effort: Pulmonary effort is normal.           Lab Results   Component Value Date    BNP 51 12/15/2023      Lab Results   Component Value Date    WBC 5.57 09/27/2024    HGB 9.6 (L) 09/27/2024    HCT 29.7 (L) 09/27/2024    MCV 88 09/27/2024     09/27/2024     Lab Results   Component Value Date    INR 1.09 06/03/2024    INR 0.90 02/21/2024    INR 1.11 01/09/2024    PROTIME 14.0 06/03/2024    PROTIME 12.5 02/21/2024    PROTIME 14.2 01/09/2024     Lab Results   Component Value Date    PTT 31 02/21/2024         I have personally reviewed pertinent imaging and laboratory results.     Code Status: Prior  Advance Directive and Living Will:      Power of :    POLST:      This text is generated with voice recognition software. There may be translation, syntax,  or grammatical errors. If you have any questions, please contact the dictating provider.    normal...

## 2024-10-02 NOTE — ANESTHESIA PREPROCEDURE EVALUATION
Procedure:  IR GASTROSTOMY TUBE PLACEMENT    Relevant Problems   ANESTHESIA (within normal limits)      CARDIO   (+) Mixed hyperlipidemia   (+) Primary hypertension      GI/HEPATIC   (+) GERD (gastroesophageal reflux disease)   (+) Metastatic colon cancer to liver (HCC)      /RENAL   (+) GOGO (acute kidney injury) (HCC)   (+) Stage 3a chronic kidney disease (HCC)      GYN   (+) Malignant neoplasm of right female breast (HCC)      HEMATOLOGY   (+) Nutritional anemia   (+) Thrombocytopenia (HCC)      Oncology   (+) 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HCC)        Physical Exam    Airway    Mallampati score: II  TM Distance: >3 FB  Neck ROM: full     Dental       Cardiovascular  Cardiovascular exam normal    Pulmonary  Pulmonary exam normal     Other Findings  post-pubertal.      Anesthesia Plan  ASA Score- 3     Anesthesia Type- general with ASA Monitors.         Additional Monitors:     Airway Plan:            Plan Factors-    Chart reviewed.    Patient summary reviewed.                  Induction- intravenous.    Postoperative Plan-         Informed Consent- Anesthetic plan and risks discussed with patient.

## 2024-10-02 NOTE — BRIEF OP NOTE (RAD/CATH)
INTERVENTIONAL RADIOLOGY PROCEDURE NOTE    Date: 10/2/2024    Procedure:   Procedure Summary       Date: 10/02/24 Room / Location: [facility] Interventional Radiology    Anesthesia Start: 1033 Anesthesia Stop:     Procedure: IR GASTROSTOMY TUBE PLACEMENT Diagnosis:       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HCC)      (Needs head and neck radiation)    Scheduled Providers: Sam Viera DO Responsible Provider: Sam Viera DO    Anesthesia Type: general ASA Status: 3            Preoperative diagnosis:   1. 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HCC)         Postoperative diagnosis: Same.    Surgeon: Caleb Mosher MD     Assistant: None. No qualified resident was available.    Blood loss: None    Specimens: None     Findings: Successful 18 Fr gastrostomy tube placement.    Complications: None immediate.    Anesthesia: local and MAC sedation

## 2024-10-03 ENCOUNTER — TELEPHONE (OUTPATIENT)
Age: 69
End: 2024-10-03

## 2024-10-03 ENCOUNTER — APPOINTMENT (OUTPATIENT)
Dept: RADIATION ONCOLOGY | Facility: CLINIC | Age: 69
End: 2024-10-03
Attending: INTERNAL MEDICINE
Payer: MEDICARE

## 2024-10-03 PROCEDURE — 77014 CHG CT GUIDANCE RADIATION THERAPY FLDS PLACEMENT: CPT | Performed by: RADIOLOGY

## 2024-10-03 PROCEDURE — 77386 HB NTSTY MODUL RAD TX DLVR CPLX: CPT | Performed by: RADIOLOGY

## 2024-10-03 NOTE — TELEPHONE ENCOUNTER
Left message with call back number. PT need two week follow up with Gi post IR gtube placement(10/02). Will attempt to make appointment with Dick Vizcaino PA-C.

## 2024-10-04 ENCOUNTER — APPOINTMENT (OUTPATIENT)
Dept: RADIATION ONCOLOGY | Facility: CLINIC | Age: 69
End: 2024-10-04
Attending: INTERNAL MEDICINE
Payer: MEDICARE

## 2024-10-04 ENCOUNTER — HOSPITAL ENCOUNTER (OUTPATIENT)
Dept: INFUSION CENTER | Facility: CLINIC | Age: 69
End: 2024-10-04
Payer: MEDICARE

## 2024-10-04 DIAGNOSIS — C09.9 RIGHT TONSILLAR SQUAMOUS CELL CARCINOMA (HCC): ICD-10-CM

## 2024-10-04 DIAGNOSIS — C18.9 METASTATIC COLON CANCER TO LIVER (HCC): Primary | ICD-10-CM

## 2024-10-04 DIAGNOSIS — C78.7 METASTATIC COLON CANCER TO LIVER (HCC): Primary | ICD-10-CM

## 2024-10-04 LAB
ALBUMIN SERPL BCG-MCNC: 3.4 G/DL (ref 3.5–5)
ALP SERPL-CCNC: 90 U/L (ref 34–104)
ALT SERPL W P-5'-P-CCNC: 12 U/L (ref 7–52)
ANION GAP SERPL CALCULATED.3IONS-SCNC: 7 MMOL/L (ref 4–13)
AST SERPL W P-5'-P-CCNC: 22 U/L (ref 13–39)
BASOPHILS # BLD AUTO: 0.02 THOUSANDS/ΜL (ref 0–0.1)
BASOPHILS NFR BLD AUTO: 0 % (ref 0–1)
BILIRUB SERPL-MCNC: 0.45 MG/DL (ref 0.2–1)
BUN SERPL-MCNC: 24 MG/DL (ref 5–25)
CALCIUM ALBUM COR SERPL-MCNC: 9.6 MG/DL (ref 8.3–10.1)
CALCIUM SERPL-MCNC: 9.1 MG/DL (ref 8.4–10.2)
CHLORIDE SERPL-SCNC: 108 MMOL/L (ref 96–108)
CO2 SERPL-SCNC: 24 MMOL/L (ref 21–32)
CREAT SERPL-MCNC: 1.14 MG/DL (ref 0.6–1.3)
EOSINOPHIL # BLD AUTO: 0.08 THOUSAND/ΜL (ref 0–0.61)
EOSINOPHIL NFR BLD AUTO: 2 % (ref 0–6)
ERYTHROCYTE [DISTWIDTH] IN BLOOD BY AUTOMATED COUNT: 16.8 % (ref 11.6–15.1)
GFR SERPL CREATININE-BSD FRML MDRD: 49 ML/MIN/1.73SQ M
GLUCOSE SERPL-MCNC: 93 MG/DL (ref 65–140)
HCT VFR BLD AUTO: 27.8 % (ref 34.8–46.1)
HGB BLD-MCNC: 8.9 G/DL (ref 11.5–15.4)
IMM GRANULOCYTES # BLD AUTO: 0.02 THOUSAND/UL (ref 0–0.2)
IMM GRANULOCYTES NFR BLD AUTO: 0 % (ref 0–2)
LYMPHOCYTES # BLD AUTO: 0.41 THOUSANDS/ΜL (ref 0.6–4.47)
LYMPHOCYTES NFR BLD AUTO: 8 % (ref 14–44)
MAGNESIUM SERPL-MCNC: 1.5 MG/DL (ref 1.9–2.7)
MCH RBC QN AUTO: 27.7 PG (ref 26.8–34.3)
MCHC RBC AUTO-ENTMCNC: 32 G/DL (ref 31.4–37.4)
MCV RBC AUTO: 87 FL (ref 82–98)
MONOCYTES # BLD AUTO: 0.5 THOUSAND/ΜL (ref 0.17–1.22)
MONOCYTES NFR BLD AUTO: 10 % (ref 4–12)
NEUTROPHILS # BLD AUTO: 4.2 THOUSANDS/ΜL (ref 1.85–7.62)
NEUTS SEG NFR BLD AUTO: 80 % (ref 43–75)
NRBC BLD AUTO-RTO: 0 /100 WBCS
PLATELET # BLD AUTO: 170 THOUSANDS/UL (ref 149–390)
PMV BLD AUTO: 10.8 FL (ref 8.9–12.7)
POTASSIUM SERPL-SCNC: 4.1 MMOL/L (ref 3.5–5.3)
PROT SERPL-MCNC: 7.1 G/DL (ref 6.4–8.4)
RBC # BLD AUTO: 3.21 MILLION/UL (ref 3.81–5.12)
SODIUM SERPL-SCNC: 139 MMOL/L (ref 135–147)
WBC # BLD AUTO: 5.23 THOUSAND/UL (ref 4.31–10.16)

## 2024-10-04 PROCEDURE — 85025 COMPLETE CBC W/AUTO DIFF WBC: CPT | Performed by: INTERNAL MEDICINE

## 2024-10-04 PROCEDURE — 77386 HB NTSTY MODUL RAD TX DLVR CPLX: CPT | Performed by: RADIOLOGY

## 2024-10-04 PROCEDURE — 77014 CHG CT GUIDANCE RADIATION THERAPY FLDS PLACEMENT: CPT | Performed by: RADIOLOGY

## 2024-10-04 PROCEDURE — 83735 ASSAY OF MAGNESIUM: CPT | Performed by: INTERNAL MEDICINE

## 2024-10-04 PROCEDURE — 80053 COMPREHEN METABOLIC PANEL: CPT | Performed by: INTERNAL MEDICINE

## 2024-10-04 NOTE — PROGRESS NOTES
Pt here for central labs.  Labs obtained via port a cath.  Port flushed per protocol.  Pt declined AVS but is aware of her next appt on Oct 7 at 10:30

## 2024-10-07 ENCOUNTER — HOSPITAL ENCOUNTER (OUTPATIENT)
Dept: INFUSION CENTER | Facility: CLINIC | Age: 69
Discharge: HOME | End: 2024-10-07
Payer: MEDICARE

## 2024-10-07 ENCOUNTER — APPOINTMENT (OUTPATIENT)
Dept: RADIATION ONCOLOGY | Facility: CLINIC | Age: 69
End: 2024-10-07
Attending: RADIOLOGY
Payer: MEDICARE

## 2024-10-07 ENCOUNTER — NUTRITION (OUTPATIENT)
Dept: NUTRITION | Facility: CLINIC | Age: 69
End: 2024-10-07

## 2024-10-07 ENCOUNTER — TELEPHONE (OUTPATIENT)
Age: 69
End: 2024-10-07

## 2024-10-07 ENCOUNTER — PATIENT MESSAGE (OUTPATIENT)
Age: 69
End: 2024-10-07

## 2024-10-07 ENCOUNTER — PATIENT OUTREACH (OUTPATIENT)
Dept: HEMATOLOGY ONCOLOGY | Facility: CLINIC | Age: 69
End: 2024-10-07

## 2024-10-07 VITALS
SYSTOLIC BLOOD PRESSURE: 106 MMHG | TEMPERATURE: 97.4 F | RESPIRATION RATE: 18 BRPM | HEART RATE: 81 BPM | HEIGHT: 66 IN | DIASTOLIC BLOOD PRESSURE: 72 MMHG | WEIGHT: 191.8 LBS | BODY MASS INDEX: 30.82 KG/M2

## 2024-10-07 DIAGNOSIS — C09.9 RIGHT TONSILLAR SQUAMOUS CELL CARCINOMA (HCC): Primary | ICD-10-CM

## 2024-10-07 DIAGNOSIS — Z71.3 NUTRITIONAL COUNSELING: Primary | ICD-10-CM

## 2024-10-07 PROCEDURE — 77336 RADIATION PHYSICS CONSULT: CPT | Performed by: RADIOLOGY

## 2024-10-07 PROCEDURE — 96367 TX/PROPH/DG ADDL SEQ IV INF: CPT

## 2024-10-07 PROCEDURE — 96413 CHEMO IV INFUSION 1 HR: CPT

## 2024-10-07 PROCEDURE — 77014 CHG CT GUIDANCE RADIATION THERAPY FLDS PLACEMENT: CPT | Performed by: RADIOLOGY

## 2024-10-07 PROCEDURE — 77386 HB NTSTY MODUL RAD TX DLVR CPLX: CPT | Performed by: RADIOLOGY

## 2024-10-07 RX ORDER — MAGNESIUM SULFATE HEPTAHYDRATE 40 MG/ML
2 INJECTION, SOLUTION INTRAVENOUS ONCE
Status: COMPLETED | OUTPATIENT
Start: 2024-10-07 | End: 2024-10-07

## 2024-10-07 RX ORDER — SODIUM CHLORIDE 9 MG/ML
20 INJECTION, SOLUTION INTRAVENOUS ONCE
Status: COMPLETED | OUTPATIENT
Start: 2024-10-07 | End: 2024-10-07

## 2024-10-07 RX ADMIN — MAGNESIUM SULFATE HEPTAHYDRATE 2 G: 40 INJECTION, SOLUTION INTRAVENOUS at 14:16

## 2024-10-07 RX ADMIN — CISPLATIN 59.1 MG: 1 INJECTION, SOLUTION INTRAVENOUS at 13:05

## 2024-10-07 RX ADMIN — SODIUM CHLORIDE 20 ML/HR: 0.9 INJECTION, SOLUTION INTRAVENOUS at 11:53

## 2024-10-07 RX ADMIN — SODIUM CHLORIDE 500 ML: 0.9 INJECTION, SOLUTION INTRAVENOUS at 14:16

## 2024-10-07 RX ADMIN — DEXAMETHASONE SODIUM PHOSPHATE: 10 INJECTION, SOLUTION INTRAMUSCULAR; INTRAVENOUS at 11:54

## 2024-10-07 RX ADMIN — SODIUM CHLORIDE 500 ML: 0.9 INJECTION, SOLUTION INTRAVENOUS at 11:54

## 2024-10-07 RX ADMIN — FOSAPREPITANT 150 MG: 150 INJECTION, POWDER, LYOPHILIZED, FOR SOLUTION INTRAVENOUS at 12:14

## 2024-10-07 NOTE — PROGRESS NOTES
Patient arrived to unit without complaint. Patient tolerated chemotherapy and magnesium IV without incident. AVS declined and patient aware of next appointment on 10/11/24 at 11:00. Patient left in stable condition.

## 2024-10-07 NOTE — PATIENT INSTRUCTIONS
Nutrition Rx & Recommendations:  Diet: high calorie/high protein, soft diet as able. Enteral nutrition as 1' source of nutrition  Nutrition Supplements: continue utilizing Ensure shakes as needed/able.  May use homemade shakes/smoothies as desired.  If using a pre-made shake/bar, choose ones with >300 calories and >10 grams protein (ex. Ensure Complete, Ensure Enlive, Ensure Plus, Boost Plus, Boost Very High Calorie, etc.).  Small, frequent meals/snacks may be easier to tolerate than 3 large daily meals.  Aim for 5-6 small meals per day.  Include protein at all meals/snacks.  Include a variety of foods (as tolerated/allowed by diet).  Stay hydrated by sipping fluids of choice/tolerance throughout the day.    Liquid nutrition may be better tolerated than solids at times.  Alter food choices and eating patterns to accommodate changing needs.  Refer to Eating Hints book for other meal/snack ideas and symptom management.  Avoid spicy, acidic, sharp/hard/crunchy foods & carbonated beverages as needed.  Follow proper oral care; Try baking soda/salt water rinse recipe (mix 3/4 tsp salt + 1 tsp baking soda + 1 qt water; rinse with plain water after using) in Eating Hints book (pg 18).  Brush your teeth before/after meals & before bed.  For sore mouth/throat: choose foods that are easy to chew/swallow; cook foods until they are soft/tender; moisten & soften foods (gravy/sauce/broth/yogurt); cut food into small pieces; drink with a straw (as tolerated); eat with a very small spoon; eat cold or room temperature food; suck on ice chips; Avoid citrus, spicy foods, tomatoes/ketchup, salty foods, raw vegetables, sharp/crunchy/hard foods & alcohol (see pages 23-28 in your Eating Hints book).  For trouble swallowing: eat 5-6 small meals/day; choose foods that are easy to swallow; choose foods that are high in protein & calories; cook foods until they are soft/tender; cut food into small pieces; moisten & soften foods  (gravy/sauce/broth/yogurt); sip drinks through a straw (as tolerated); avoid foods/drinks that can burn/scrape your throat (hot temperatures, spicy foods, acidic foods, sharp/hard/crunchy foods, alcohol); talk to your doctor about a Speech Therapy referral for a swallowing evaluation (see page 26-28 in your Eating Hints book).  For dry mouth: sip water throughout the day; try very sweet drinks; chew gum or suck on hard candy, popsicles, & ice chips; eat easy to swallow foods (such as pureed cooked foods or soups); moisten food with sauce/gravy/salad dressing; do not drink beer, wine, or any type of alcohol; keep lips moist with lip balm; avoid tobacco products & second-hand smoke; try Biotene as needed (see pages 17-18 in your Eating Hints book).  Follow proper oral care; Try baking soda/salt water rinse recipe (mix 3/4 tsp salt + 1 tsp baking soda + 1 qt water; rinse with pain water after using) in Eating Hints book (pg 18).  Brush your teeth before/after meals & before bed.  Weigh yourself regularly. If you notice weight loss, make an effort to increase your daily food/calorie intake. If you continue to notice loss after these efforts, reach out to your dietitian to establish a plan to stabilize weight.   Soft/easy to swallow foods that are high in calories and protein: casseroles, chicken/egg/tuna salad with extra garcia to add calories and moisture, oatmeal/cream of wheat made with whole milk, cottage cheese, whole milk Greek yogurt, scrambled eggs with cheese, avocado, macaroni and cheese, mashed potatoes made with butter and whole milk or dry milk powder, ground meat with extra sauce/gravy, canned fruit, peanut butter, cream soups (cream of chicken, cream of mushroom, broccoli cheddar), pudding made with whole milk, custard, ice cream, banana, milkshakes/smoothies, Review page 47 of Eating Hints Book for more ideas.   Switch to whole milk for more calories  Choose liquids with calories: whole milk, Fairlife milk  (higher protein/lactose-free milk), chocolate milk, drinkable yogurt, kefir, 100% fruit juice, diluted juice, bone broth (higher protein broth), creamy soups, sports drinks (Gatorade, Poweraide, Pedialyte, etc.), Italian ice, popsicles, milkshakes, smoothies, oral nutrition supplements (Ensure, Boost, etc.), gelatin/Jello, etc.  TF plan   TF Goal: 1 container (237 mL) 5 times per day + 1.5 containers (355 mL) once/day of Jevity 1.5 via PEG (push syringe or gravity syringe method to administer boluses; 1 container to infuse over ~15-20 minutes). (Total of 6.5 cartons/day)  Water Flushin mL free water flush pre/post each bolus (720 mL water)   Enteral Nutrition goal to provide: 1540 mL volume (6.5 containers/day), 2310 kcal, 98 grams protein, 1171 mL free water, 1891 mL total water daily.  Allow for substitutions  Your syringes are each 60 mL or 2 fl oz.  Always flush your feeding tube with 60 mL room-temp water 1-2 times daily while feeding tube is not in use.  Call [facility] when you have 10 days left of TF supplies: [phone], option 3 (customer support).      Follow Up Plan: 10/14/24 during infusion  Recommend Referral to Other Providers: none at this time

## 2024-10-07 NOTE — TELEPHONE ENCOUNTER
Spoke with pt made follow up appointment with Dick Vizcaino. She asked about TF supply arrive I told pt I would send message to [facility] And ask.  I asked pt if I could send my chart message once I have an answer. Pt is agreeable to this.

## 2024-10-07 NOTE — PROGRESS NOTES
Pt called stating she did not receive a phone call from [facility] about her  time today.  Pt was not scheduled in round trip. Spoke with the [facility] to have them set up a  LYFT  for her for today

## 2024-10-07 NOTE — PROGRESS NOTES
Outpatient Oncology Nutrition Consultation   Type of Consult: Follow Up   Care Location: [facility]    Reason for referral: Received notification by [facility] on 8/21/24 that pt has triggered for oncology nutrition care (reason for referral: HNC dx and Malnutrition Screening Tool (MST) Triggers: scored a 0 indicating No recent wt loss and is not eating poorly due to a decreased appetite. 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  - grade 2  - %  - MD 95%  - HER2 negative     12/20/2018 Surgery    Right partial mastectomy and SLN biopsy:  Infiltrating ductal carcinoma, Grade 2/3, felt to be between 2.0 cm and 2.5 cm in greatest dimension  - No invasive tumor is seen at inked margins, but there is a focus of DCIS seen within approximately 1mm of the inked medial margin  - no LVI  - no LCIS  - 0/2 LN's  at least Stage IIA - pT2, pN0, cM0, G2.    - Dr Coronado     12/20/2018 -  Cancer Staged    Stage IIA - pT2, pN0, cM0, G2.       1/4/2019 Genomic Testing    Oncotype DX score: 13     2/1/2019 -  Hormone Therapy    anastrozole 1 mg daily as adjuvant endocrine therapy    - Dr Daley       2/14/2019 - 4/3/2019 Radiation    Course: C1    Plan ID Energy  "Fractions Dose per Fraction (cGy) Dose Correction (cGy) Total Dose Delivered (cGy) Elapsed Days   R Breast 10X/6X 25 / 25 200 0 5,000 40   R BRST BOOST 16E 5 / 5 200 0 1,000 7      Treatment dates:  C1: 2/14/2019 - 4/3/2019       Metastatic colon cancer to liver (HCC)   7/6/2023 Genetic Testing    CARIS Results:   KRAS Pathogenic Variant Exon 2  p.G12D : lack of benefit of cetuximab, panitumumab Level 2   No other actionable mutation; MSI stable; TMB low   MiFOLOXAi Results: \"Decreased Benefit of FOLFOX + Bevacizumab in first line metastatic CRC.  This patient may achieve improved results by receiving an alternative to FOLFOX, such as FOLFIRI, as their initial regimen. As an adjustment to frontline FOLFOXIRI following toxicity: This patient may achieve improved results by removing the oxaliplatin portion of their regimen\".         7/11/2023 Initial Diagnosis    Metastatic colon cancer to liver (HCC)     7/13/2023 -  Cancer Staged    Staging form: Colon and Rectum, AJCC 8th Edition  - Clinical stage from 7/13/2023: cT3, cN0, pM1 - Signed by Harika Medina DO on 9/28/2023  Total positive nodes: 0       7/31/2023 - 8/1/2024 Chemotherapy    cyanocobalamin, 1,000 mcg, Intramuscular, Every 30 days, 7 of 9 cycles  Administration: 1,000 mcg (5/9/2024), 1,000 mcg (5/24/2024), 1,000 mcg (6/20/2024), 1,000 mcg (7/3/2024), 1,000 mcg (7/18/2024), 1,000 mcg (8/1/2024)  potassium chloride, 20 mEq, Intravenous, Once, 2 of 2 cycles  Administration: 20 mEq (11/6/2023), 20 mEq (11/20/2023)  alteplase (CATHFLO), 2 mg, Intracatheter, Every 1 Minute as needed, 21 of 23 cycles  Administration: 2 mg (1/3/2024)  pegfilgrastim (NEULASTA), 6 mg, Subcutaneous, Once, 12 of 12 cycles  Administration: 6 mg (10/25/2023), 6 mg (11/8/2023), 6 mg (11/22/2023), 6 mg (12/6/2023), 6 mg (12/20/2023), 6 mg (1/12/2024), 6 mg (1/25/2024), 6 mg (4/25/2024), 6 mg (5/9/2024), 6 mg (5/24/2024), 6 mg (6/20/2024)  fluorouracil (ADRUCIL), 895 mg, Intravenous, " Once, 21 of 23 cycles  Dose modification: 320 mg/m2 (original dose 400 mg/m2, Cycle 11)  Administration: 900 mg (7/31/2023), 900 mg (8/14/2023), 900 mg (8/28/2023), 900 mg (9/11/2023), 900 mg (9/25/2023), 900 mg (10/9/2023), 900 mg (10/23/2023), 895 mg (11/6/2023), 895 mg (11/20/2023), 895 mg (12/4/2023), 700 mg (12/18/2023), 680 mg (1/10/2024), 680 mg (1/23/2024), 625 mg (4/23/2024), 625 mg (5/7/2024), 625 mg (5/22/2024), 625 mg (6/4/2024), 625 mg (6/18/2024), 625 mg (7/1/2024), 625 mg (7/16/2024), 625 mg (7/30/2024)  nivolumab (OPDIVO) IVPB, 240 mg (100 % of original dose 240 mg), Intravenous, Once, 13 of 15 cycles  Dose modification: 240 mg (original dose 240 mg, Cycle 9)  Administration: 240 mg (11/20/2023), 240 mg (12/4/2023), 240 mg (12/18/2023), 240 mg (1/10/2024), 240 mg (1/23/2024), 240 mg (4/23/2024), 240 mg (5/7/2024), 240 mg (5/22/2024), 240 mg (6/4/2024), 240 mg (6/18/2024), 240 mg (7/1/2024), 240 mg (7/16/2024), 240 mg (7/30/2024)  leucovorin calcium IVPB, 896 mg, Intravenous, Once, 21 of 23 cycles  Administration: 900 mg (7/31/2023), 900 mg (8/14/2023), 900 mg (8/28/2023), 900 mg (9/11/2023), 900 mg (9/25/2023), 900 mg (10/9/2023), 900 mg (10/23/2023), 900 mg (11/6/2023), 900 mg (11/20/2023), 900 mg (12/4/2023), 900 mg (12/18/2023), 850 mg (1/10/2024), 850 mg (1/23/2024), 800 mg (4/23/2024), 800 mg (5/7/2024), 800 mg (5/22/2024), 800 mg (6/4/2024), 800 mg (6/18/2024), 800 mg (7/1/2024), 800 mg (7/16/2024), 800 mg (7/30/2024)  oxaliplatin (ELOXATIN) chemo infusion, 85 mg/m2 = 190.4 mg, Intravenous, Once, 12 of 12 cycles  Dose modification: 68 mg/m2 (original dose 85 mg/m2, Cycle 11, Reason: Other (Must fill in a comment), Comment: increased fatigue)  Administration: 190.4 mg (7/31/2023), 190.4 mg (8/14/2023), 200 mg (8/28/2023), 200 mg (9/11/2023), 200 mg (9/25/2023), 200 mg (10/9/2023), 200 mg (10/23/2023), 200 mg (11/6/2023), 200 mg (11/20/2023), 190.4 mg (12/4/2023), 150 mg (12/18/2023), 150 mg  (1/10/2024)  fluorouracil (ADRUCIL) ambulatory infusion Soln, 1,200 mg/m2/day = 5,375 mg, Intravenous, Over 46 hours, 21 of 23 cycles     9/26/2023 -  Cancer Staged    Staging form: Colon and Rectum, AJCC 8th Edition  - Pathologic: pT3, pN0, cM1 - Signed by Vickie Israel MD on 4/3/2024  Total positive nodes: 0       3/12/2024 Surgery    A. Terminal ileum, appendix, right colon (right hemicolectomy):  - Adenocarcinoma (5.2cm)  - Forty-nine (49) lymph nodes negative for carcinoma (0/49)    - Acellular mucin present in one (1) lymph node   - See staging synoptic (ypT3N0)     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HCC)   7/27/2023 Initial Diagnosis    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HCC)     7/27/2023 -  Cancer Staged    Staging form: Pharynx - Oropharynx, AJCC 8th Edition  - Clinical stage from 7/27/2023: Stage III (cT1, cN3, cM0, p16+) - Signed by Evan Plummer MD on 7/27/2023  Stage prefix: Initial diagnosis       9/17/2024 -  Chemotherapy    alteplase (CATHFLO), 2 mg, Intracatheter, Every 1 Minute as needed, 4 of 7 cycles  CISplatin (PLATINOL) infusion, 70 mg (original dose 40 mg/m2), Intravenous, Once, 4 of 7 cycles  Dose modification: 70 mg (original dose 40 mg/m2, Cycle 1, Reason: Anticipated Tolerance), 30 mg/m2 (original dose 40 mg/m2, Cycle 4, Reason: Neuropathy, Comment: dose reduction to 30 mg/m2 due to neuropathy)  Administration: 70 mg (9/17/2024), 70 mg (9/23/2024), 70 mg (9/30/2024)  fosaprepitant (EMEND) IVPB, 150 mg, Intravenous, Once, 4 of 7 cycles  Administration: 150 mg (9/17/2024), 150 mg (9/23/2024), 150 mg (9/30/2024)       Past Medical & Surgical Hx:   Patient Active Problem List   Diagnosis    Primary hypertension    Mixed hyperlipidemia    Malignant neoplasm of right female breast (HCC)    Vitamin D deficiency    Prediabetes    Right thyroid nodule    GERD (gastroesophageal reflux disease)    Obesity    Metastatic colon cancer to liver (HCC)    Right tonsillar squamous  cell carcinoma (HCC)    Poor appetite    Nutritional anemia    Dehydration    Drug-induced constipation    Chemotherapy induced neutropenia (HCC)    Stage 3a chronic kidney disease (HCC)    Thrombocytopenia (HCC)    Neuropathy    Diastolic dysfunction    Hypokalemia    Leukemoid reaction    GOGO (acute kidney injury) (HCC)    Acute post-operative pain    At risk for constipation    At risk for delirium    Advance care planning    Physical deconditioning    History of CVA (cerebrovascular accident)    Chronic nasal congestion    Elevated LFTs    Vitamin B12 deficiency     Past Medical History:   Diagnosis Date    Anemia     Arthritis     Body mass index (BMI) 40.0-44.9, adult (HCC)     Breast cancer (HCC) 12/17/2018    Cancer (HCC)     right breast, colon, liver, right tonsil    Cervical lymphadenopathy     CT neck on 3/30/2023- Large right level 2A lymphadenopathy as described above and suspicious for neoplasm.  Correlation with histopathology is recommended.  Mild asymmetric prominence of the right palatine tonsil with otherwise no definitive nodular enhancing lesions identified along the course of the aerodigestive tract.     5/26/23- FNA of this node was nonrevealing for tissue etiology, but it d    Encounter for screening for HIV 07/07/2022    Follow-up examination 04/04/2023    GERD (gastroesophageal reflux disease)     Hyperlipidemia     Hypertension     Obesity     Stroke (HCC)     TIA     Stroke (HCC)     TIA     Transaminitis 09/22/2021    Vasomotor rhinitis     Refilled flonase today. Stopped taking since January 2022     Past Surgical History:   Procedure Laterality Date    BREAST BIOPSY Right 11/23/2018    us guided bx cancer    BREAST SURGERY      COLONOSCOPY      ESOPHAGOSCOPY N/A 07/20/2023    Procedure: ESOPHAGOSCOPY;  Surgeon: David Chapa MD;  [facility];  Service: ENT    HEMICOLOECTOMY W/ ANASTOMOSIS Right 3/12/2024    Procedure: RIGHT COLECTOMY WITH ROBOTICS;  Surgeon: Vickie MARISCAL  MD Jhonatan;  [facility];  Service: Surgical Oncology    IR BIOPSY LIVER MASS  06/27/2023    IR CRYOABLATION  6/3/2024    IR GASTROSTOMY TUBE PLACEMENT  10/2/2024    IR PORT CHECK  01/03/2024    IR PORT PLACEMENT  07/28/2023    IR PORT STRIPPING  01/09/2024    LAPAROTOMY N/A 3/12/2024    Procedure: LAPAROTOMY EXPLORATORY W/ ROBOTICS;  Surgeon: Vickie Israel MD;  [facility]: [facility];  Service: Surgical Oncology    [facility] W/CELL WASHG SPX N/A 07/20/2023    Procedure: BRONCHOSCOPY;  Surgeon: David Chapa MD;  [facility];  Service: ENT    AZ LARYNGOSCOPY W/BIOPSY MICROSCOPE/TELESCOPE N/A 07/20/2023    Procedure: MICRODIRECT LARYNGOSCOPY WITH BIOPSY;  Surgeon: David Chapa MD;  [facility];  Service: ENT    AZ MASTECTOMY PARTIAL W/AXILLARY LYMPHADENECTOMY Right 12/20/2018    Procedure: ULTRASOUND LOCALIZED PARTIAL MASTECTOMY W/SENTINEL NODE BIOPSY POSS AXILLARY DISSECTION;  Surgeon: Zahida Coronado MD;  Location: [facility];  Service: General    TUBAL LIGATION      US GUIDED BREAST BIOPSY RIGHT COMPLETE Right 11/23/2018    US GUIDED INJECTION FOR RESEARCH STUDY  12/20/2018    US GUIDED INJECTION FOR RESEARCH STUDY  12/07/2018    US GUIDED INJECTION FOR RESEARCH STUDY  05/03/2019    US GUIDED LYMPH NODE BIOPSY RIGHT  05/26/2023       Review of Medications:   Vitamins, Supplements and Herbals: No, pt denies taking supplements    Current Outpatient Medications:     anastrozole (ARIMIDEX) 1 mg tablet, Take 1 tablet (1 mg total) by mouth daily, Disp: 90 tablet, Rfl: 3    atorvastatin (LIPITOR) 40 mg tablet, Take 1 tablet (40 mg total) by mouth daily at bedtime, Disp: 30 tablet, Rfl: 2    diphenhydramine, lidocaine, Al/Mg hydroxide, simethicone (Magic Mouthwash) SUSP, Swish and swallow 10 mL every 4 (four) hours as needed for mouth pain or discomfort, Disp: 480 mL, Rfl: 1    docusate sodium (COLACE) 50 mg capsule, Take 1 capsule (50 mg total) by mouth 2 (two)  times a day, Disp: 90 capsule, Rfl: 1    ergocalciferol (VITAMIN D2) 50,000 units, Take 1 capsule (50,000 Units total) by mouth once a week, Disp: 90 capsule, Rfl: 3    ferrous sulfate 325 (65 Fe) mg tablet, Take 1 tablet (325 mg total) by mouth daily with breakfast, Disp: 30 tablet, Rfl: 0    folic acid (FOLVITE) 1 mg tablet, Take 1 tablet (1 mg total) by mouth in the morning, Disp: 90 tablet, Rfl: 3    gabapentin (NEURONTIN) 300 mg capsule, Take 1 pills in the morning, 1 pill at lunch and 2 pills before bed, Disp: 120 capsule, Rfl: 3    hydrocortisone 1 % ointment, , Disp: , Rfl:     lidocaine-prilocaine (EMLA) cream, Apply topically as needed for mild pain, Disp: 30 g, Rfl: 3    losartan (COZAAR) 50 mg tablet, Take 1 tablet (50 mg total) by mouth daily, Disp: 90 tablet, Rfl: 5    mirtazapine (REMERON) 15 mg tablet, Take 1 tablet (15 mg total) by mouth daily at bedtime Patient is also on a 30 mg tablet, for a total dose of 45 mg, Disp: 30 tablet, Rfl: 3    mirtazapine (REMERON) 30 mg tablet, Take 1 tablet (30 mg total) by mouth daily at bedtime, Disp: 30 tablet, Rfl: 3    omeprazole (PriLOSEC) 20 mg delayed release capsule, Take 1 capsule (20 mg total) by mouth daily, Disp: 30 capsule, Rfl: 5    ondansetron (ZOFRAN) 8 mg tablet, Take 1 tablet (8 mg total) by mouth every 8 (eight) hours as needed for nausea or vomiting, Disp: 90 tablet, Rfl: 2    potassium chloride (Klor-Con M20) 20 mEq tablet, Take 1 tablet (20 mEq total) by mouth 2 (two) times a day, Disp: 90 tablet, Rfl: 1    prochlorperazine (COMPAZINE) 10 mg tablet, Take 1 tablet (10 mg total) by mouth every 6 (six) hours as needed for nausea or vomiting, Disp: 90 tablet, Rfl: 2    pyridoxine (VITAMIN B6) 50 mg tablet, Take 1 tablet (50 mg total) by mouth daily, Disp: 90 tablet, Rfl: 3    vitamin B-12 (VITAMIN B-12) 1,000 mcg tablet, , Disp: , Rfl:   No current facility-administered medications for this visit.    Facility-Administered Medications Ordered in  "Other Visits:     CISplatin (PLATINOL) 59.1 mg in sodium chloride 0.9 % 250 mL infusion, 30 mg/m2 (Treatment Plan Recorded), Intravenous, Once, Harika Medina DO, Last Rate: 309.1 mL/hr at 10/07/24 1305, 59.1 mg at 10/07/24 1305    magnesium sulfate 2 g/50 mL IVPB (premix) 2 g, 2 g, Intravenous, Once, Harika Medina DO    sodium chloride 0.9 % bolus 500 mL, 500 mL, Intravenous, Once, Harika Medina DO    Most Recent Lab Results:   Lab Results   Component Value Date    WBC 5.23 10/04/2024    NEUTROABS 4.20 10/04/2024    IRON 55 05/07/2024    TIBC 292 05/07/2024    FERRITIN 145 05/07/2024    CHOLESTEROL 167 04/24/2024    TRIG 137 04/24/2024    HDL 43 (L) 04/24/2024    LDLCALC 97 04/24/2024    ALT 12 10/04/2024    AST 22 10/04/2024    ALB 3.4 (L) 10/04/2024    SODIUM 139 10/04/2024    SODIUM 138 09/27/2024    K 4.1 10/04/2024    K 4.1 09/27/2024     10/04/2024    BUN 24 10/04/2024    BUN 22 09/27/2024    CREATININE 1.14 10/04/2024    CREATININE 1.15 09/27/2024    EGFR 49 10/04/2024    PHOS 1.7 (L) 03/15/2024    PHOS 2.4 03/14/2024    TSH 1.58 01/03/2022    GLUCOSE 209 (H) 03/12/2024    POCGLU 101 08/05/2024    GLUF 95 09/13/2024    GLUF 101 (H) 06/03/2024    GLUC 93 10/04/2024    HGBA1C 5.5 02/21/2024    HGBA1C 5.9 (H) 06/13/2023    HGBA1C 6.1 (H) 07/09/2022    CALCIUM 9.1 10/04/2024    FOLATE >20.0 (H) 05/23/2019    MG 1.5 (L) 10/04/2024       Anthropometric Measurements:   Height: 66\"  Ht Readings from Last 1 Encounters:   10/07/24 5' 5.98\" (1.676 m)     Wt Readings from Last 20 Encounters:   10/07/24 87 kg (191 lb 12.8 oz)   10/02/24 88.9 kg (195 lb 15.8 oz)   10/01/24 89.4 kg (197 lb)   09/30/24 88 kg (194 lb 0.1 oz)   09/23/24 88.2 kg (194 lb 7.1 oz)   09/17/24 90 kg (198 lb 6.6 oz)   09/13/24 88.5 kg (195 lb)   09/10/24 89.8 kg (198 lb)   09/03/24 89.8 kg (198 lb)   08/20/24 92.2 kg (203 lb 3.2 oz)   08/16/24 90.3 kg (199 lb)   08/06/24 88.9 kg (196 lb)   07/30/24 87.8 kg (193 lb 9 oz)   07/16/24 89 " kg (196 lb 3.4 oz)   07/12/24 87.7 kg (193 lb 6.4 oz)   07/03/24 87.5 kg (193 lb)   07/01/24 86.8 kg (191 lb 5.8 oz)   06/28/24 87.4 kg (192 lb 9.6 oz)   06/18/24 89.4 kg (197 lb 1.5 oz)   06/06/24 89.1 kg (196 lb 6.9 oz)       Weight History:   Usual Weight: 275#  [employer]: (2/22/19) 264.6#, (4/2/19) 263.4, (9/16/24) 197.4#, (9/23/24) 194#, (9/30/24) 192.8#, (10/7/24) 190.2#  Home Scale: n/a    Oncology Nutrition-Anthropometrics      Flowsheet Row Nutrition from 10/7/2024 in [facility] Nutrition from 9/30/2024 in [facility]   Patient [age] (years): 69 years 70 years   Patient (female) height (in): 66 in 66 in   Current Weight to be used for anthropometric calculations (lbs) 191.8 lbs 194.1 lbs   Current Weight to be used for anthropometric calculations (kg) 87.2 kg 88.2 kg   BMI: 31 31.3   IBW female: 130 lbs 130 lbs   IBW (kg) female: 59.1 kg 59.1 kg   IBW % (female) 147.5 % 149.3 %   Adjusted BW (female): 145.5 lbs 146 lbs   Adjusted BW kg (female): 66.1 kg 66.4 kg   % weight change after 1 week: -2.6 % -0.2 %   Weight change after 1 week (lbs) -5.2 lbs -0.4 lbs   % weight change after 1 month: -3.1 % -2 %   Weight change after 1 month (lbs) -6.2 lbs -3.9 lbs   % weight change after 3 months: -2.3 % 0.8 %   Weight change after 3 months (lbs) -4.6 lbs 1.5 lbs            Nutrition-Focused Physical Findings: none observed    Food/Nutrition-Related History & Client/Social History:    Current Nutrition Impact Symptoms:  [] Nausea  [x] Reduced Appetite  [] Acid Reflux    [] Vomiting  [x] Unintended Wt Loss [] Malabsorption    [] Diarrhea  [] Unintended Wt Gain  [] Dumping Syndrome    [] Constipation  [] Thick Mucous/Secretions  [] Abdominal Pain    [] Dysgeusia (Altered Taste)  [x] Xerostomia (Dry Mouth)  [] Gas    [] Dysosmia (Altered Smell)  [] Thrush  [] Difficulty Chewing    [] Oral Mucositis (Sore Mouth)  [x] Fatigue  [] Hyperglycemia    Lab Results   Component Value Date    HGBA1C 5.5 2024      [x] Odynophagia  [] Esophagitis  [] Other:    [x] Dysphagia -has to chew food well [] Early Satiety  [] No Problems Eating      Food Allergies & Intolerances: no    Current Diet: Soft/Moist. Chewing food well before swallowing  Current Nutrition Intake: Unchanged from last visit  Appetite: Fair   Nutrition Route: PO and PEG- will start using PEG today or tomorrow  Oral Care: brushes daily  Activity level: ADLs    24 Hr Diet Recall:   Breakfast: muffin and coffee  Snack: nothing  Lunch: eggs, oatmeal, sausage  Snack: nothing  Dinner: Ensure   Snack: nothing    Beverages: water (16.9oz x 2), decaf coffee (8oz x3), Ensure (10oz x1-2)  Supplements:   Ensure Complete (10 oz, 350 kcal, 30 g pro) 1-2 times daily    EN Recall:  DME: [employer]- TF order placed today 10/7/24  Access Type: PEG      Oncology Nutrition-Estimated Needs      Flowsheet Row Nutrition from 2024 in [facility] Nutrition from 2024 in [facility]   Weight type used Adjusted weight Actual weight   Weight in kilograms (kg) used for estimated needs 66.4 kg 90 kg   Energy needs formula:  30-35 kcal/kg 30-35 kcal/kg   Energy needs based on 30 kcal/k kcal 2700 kcal   Energy needs based on 35 kcal/k kcal 3150 kcal   Protein needs formula: 1.2-1.5 g/kg 1.2-1.5 g/kg   Protein needs based on 1.2 g/k g 108 g   Protein needs based on 1.5 g/kg 100 g 135 g   Fluid needs formula: 30-35 mL/kg 30-35 mL/kg   Fluid needs based on 30 mL/kg 1992 mL 2700 mL   Fluid needs in ounces 67 oz 91 oz   Fluid needs based on 35 mL/kg 2324 mL 3150 mL   Fluid needs in ounces 79 oz 106 oz             Discussion & Intervention:   Maira was evaluated today for an RD follow up regarding HNC.  Maira is currently undergoing tx for HNC .  Met with Maira today during her infusion. She is having trouble with  swallowing but she is chewing her food well before swallowing which she does tolerate. Her weight is down from last week. She is s/p PEG placement last week- she reports some soreness at PEG site. TF orders were placed today by GI and RD reviewed TF plan with Maira. All questions were answered.    Reviewed 24 hour recall, which revealed an inadequate po intake and enteral intake, and discussed ways to increase kcal, protein, and fluid intakes and optimize nutrient intake.  Also reviewed the importance of wt management throughout the tx process and the role of a high kcal/ high protein diet and enteral nutrition in managing wt and overall health.  Based on today's assessment, discussion included: MNT for:  xerostomia, odynophagia, dysphagia, enteral nutrition, how to modify foods for anticipated nutrition impact symptoms pt may experience during CA tx, practicing proper oral care, a high kcal/protein diet & food choices to include at all meals & snacks (Examples of high kcal foods: cheese, full-fat dairy products, nut butter, plant-based fats, coconut oil/milk, avocado, butter, cream soup, etc. Examples of high protein foods: eggs, chicken, fish, beans/legumes, nuts/nut butters, bone broth, etc.) , fortifying foods for added kcal and protein (examples include: adding cheese to foods such as eggs, mashed potatoes, casseroles, etc.; Making oatmeal with whole milk rather than water; Making fortified mashed potatoes with cream, butter, dry milk powder, plain Greek yogurt, and cheese.), adequate hydration & fluid choices, sipping on calorie containing beverages (examples include: adding whole milk or cream to coffee, oral nutrition supplements, juice, electrolyte replacement beverages, milk, etc.), eating smaller more frequent meals every 2-3 hours (5-6 small meals/day), eating when feeling most hungry, increasing oral nutrition supplements, and adding extra fat to foods while cooking such as butter, plant-based oil,  coconut oil/milk, avocado, nut butters, etc..   Moving forward, Maira was encouraged to increase kcal, protein, and fluid intakes via PO intake as able, and via PEG   Materials Provided: not applicable  All questions and concerns addressed during today’s visit.  Maira has RD contact information.    Nutrition Diagnosis:   Inadequate Energy Intake related to physiological causes, disease state and treatment related issues as evidenced by food recall, wt loss and discussion with pt and/or family.  Increased Nutrient Needs (kcal & pro) related to increased demand for nutrients and disease state as evidenced by cancer dx and pt undergoing tx for cancer.  Swallowing Difficulty related to diagnosis/treatment related causes as evidenced by RT tx, need for feeding tube.  Monitoring & Evaluation:   Goals:  weight maintenance/stabilization  adequate nutrition impact symptom management  pt to meet >/=75% estimated nutrition needs daily  achieve tube feeding goal rate    Progress Towards Goals: Progressing and New Goal(s) Added    Nutrition Rx & Recommendations:  Diet: high calorie/high protein, soft diet as able. Enteral nutrition as 1' source of nutrition  Nutrition Supplements: continue utilizing Ensure shakes as needed/able.  May use homemade shakes/smoothies as desired.  If using a pre-made shake/bar, choose ones with >300 calories and >10 grams protein (ex. Ensure Complete, Ensure Enlive, Ensure Plus, Boost Plus, Boost Very High Calorie, etc.).  Small, frequent meals/snacks may be easier to tolerate than 3 large daily meals.  Aim for 5-6 small meals per day.  Include protein at all meals/snacks.  Include a variety of foods (as tolerated/allowed by diet).  Stay hydrated by sipping fluids of choice/tolerance throughout the day.    Liquid nutrition may be better tolerated than solids at times.  Alter food choices and eating patterns to accommodate changing needs.  Refer to Eating Hints book for other meal/snack ideas  and symptom management.  Avoid spicy, acidic, sharp/hard/crunchy foods & carbonated beverages as needed.  Follow proper oral care; Try baking soda/salt water rinse recipe (mix 3/4 tsp salt + 1 tsp baking soda + 1 qt water; rinse with plain water after using) in Eating Hints book (pg 18).  Brush your teeth before/after meals & before bed.  For sore mouth/throat: choose foods that are easy to chew/swallow; cook foods until they are soft/tender; moisten & soften foods (gravy/sauce/broth/yogurt); cut food into small pieces; drink with a straw (as tolerated); eat with a very small spoon; eat cold or room temperature food; suck on ice chips; Avoid citrus, spicy foods, tomatoes/ketchup, salty foods, raw vegetables, sharp/crunchy/hard foods & alcohol (see pages 23-28 in your Eating Hints book).  For trouble swallowing: eat 5-6 small meals/day; choose foods that are easy to swallow; choose foods that are high in protein & calories; cook foods until they are soft/tender; cut food into small pieces; moisten & soften foods (gravy/sauce/broth/yogurt); sip drinks through a straw (as tolerated); avoid foods/drinks that can burn/scrape your throat (hot temperatures, spicy foods, acidic foods, sharp/hard/crunchy foods, alcohol); talk to your doctor about a Speech Therapy referral for a swallowing evaluation (see page 26-28 in your Eating Hints book).  For dry mouth: sip water throughout the day; try very sweet drinks; chew gum or suck on hard candy, popsicles, & ice chips; eat easy to swallow foods (such as pureed cooked foods or soups); moisten food with sauce/gravy/salad dressing; do not drink beer, wine, or any type of alcohol; keep lips moist with lip balm; avoid tobacco products & second-hand smoke; try Biotene as needed (see pages 17-18 in your Eating Hints book).  Follow proper oral care; Try baking soda/salt water rinse recipe (mix 3/4 tsp salt + 1 tsp baking soda + 1 qt water; rinse with pain water after using) in Eating  Hints book (pg 18).  Brush your teeth before/after meals & before bed.  Weigh yourself regularly. If you notice weight loss, make an effort to increase your daily food/calorie intake. If you continue to notice loss after these efforts, reach out to your dietitian to establish a plan to stabilize weight.   Soft/easy to swallow foods that are high in calories and protein: casseroles, chicken/egg/tuna salad with extra garcia to add calories and moisture, oatmeal/cream of wheat made with whole milk, cottage cheese, whole milk Greek yogurt, scrambled eggs with cheese, avocado, macaroni and cheese, mashed potatoes made with butter and whole milk or dry milk powder, ground meat with extra sauce/gravy, canned fruit, peanut butter, cream soups (cream of chicken, cream of mushroom, broccoli cheddar), pudding made with whole milk, custard, ice cream, banana, milkshakes/smoothies, Review page 47 of Eating Hints Book for more ideas.   Switch to whole milk for more calories  Choose liquids with calories: whole milk, Fairlife milk (higher protein/lactose-free milk), chocolate milk, drinkable yogurt, kefir, 100% fruit juice, diluted juice, bone broth (higher protein broth), creamy soups, sports drinks (Gatorade, Poweraide, Pedialyte, etc.), Italian ice, popsicles, milkshakes, smoothies, oral nutrition supplements (Ensure, Boost, etc.), gelatin/Jello, etc.  TF plan   TF Goal: 1 container (237 mL) 5 times per day + 1.5 containers (355 mL) once/day of Jevity 1.5 via PEG (push syringe or gravity syringe method to administer boluses; 1 container to infuse over ~15-20 minutes). (Total of 6.5 cartons/day)  Water Flushin mL free water flush pre/post each bolus (720 mL water)   Enteral Nutrition goal to provide: 1540 mL volume (6.5 containers/day), 2310 kcal, 98 grams protein, 1171 mL free water, 1891 mL total water daily.  Allow for substitutions  Your syringes are each 60 mL or 2 fl oz.  Always flush your feeding tube with 60 mL  room-temp water 1-2 times daily while feeding tube is not in use.  [facility] when you have 10 days left of TF supplies: [phone], option 3 (customer support).      Follow Up Plan:  10/14/24 during infusion  Recommend Referral to Other Providers: none at this time

## 2024-10-08 ENCOUNTER — APPOINTMENT (OUTPATIENT)
Dept: RADIATION ONCOLOGY | Facility: CLINIC | Age: 69
End: 2024-10-08
Attending: RADIOLOGY
Payer: MEDICARE

## 2024-10-08 ENCOUNTER — ANESTHESIA EVENT (OUTPATIENT)
Dept: PERIOP | Facility: HOSPITAL | Age: 69
End: 2024-10-08
Payer: MEDICARE

## 2024-10-08 PROCEDURE — 77014 CHG CT GUIDANCE RADIATION THERAPY FLDS PLACEMENT: CPT | Performed by: RADIOLOGY

## 2024-10-08 PROCEDURE — 77386 HB NTSTY MODUL RAD TX DLVR CPLX: CPT | Performed by: RADIOLOGY

## 2024-10-09 ENCOUNTER — ANESTHESIA (OUTPATIENT)
Dept: PERIOP | Facility: HOSPITAL | Age: 69
End: 2024-10-09
Payer: MEDICARE

## 2024-10-09 ENCOUNTER — HOSPITAL ENCOUNTER (OUTPATIENT)
Facility: HOSPITAL | Age: 69
Setting detail: OUTPATIENT SURGERY
Discharge: HOME | End: 2024-10-09
Attending: SPECIALIST | Admitting: SPECIALIST
Payer: MEDICARE

## 2024-10-09 ENCOUNTER — APPOINTMENT (OUTPATIENT)
Dept: RADIATION ONCOLOGY | Facility: CLINIC | Age: 69
End: 2024-10-09
Attending: RADIOLOGY
Payer: MEDICARE

## 2024-10-09 VITALS
BODY MASS INDEX: 31.68 KG/M2 | WEIGHT: 197.09 LBS | TEMPERATURE: 98.5 F | HEART RATE: 78 BPM | DIASTOLIC BLOOD PRESSURE: 84 MMHG | RESPIRATION RATE: 18 BRPM | HEIGHT: 66 IN | SYSTOLIC BLOOD PRESSURE: 140 MMHG | OXYGEN SATURATION: 99 %

## 2024-10-09 DIAGNOSIS — Z90.89 STATUS POST TONSILLECTOMY: Primary | ICD-10-CM

## 2024-10-09 DIAGNOSIS — C09.9 TONSIL CANCER (HCC): ICD-10-CM

## 2024-10-09 PROCEDURE — 88360 TUMOR IMMUNOHISTOCHEM/MANUAL: CPT | Performed by: STUDENT IN AN ORGANIZED HEALTH CARE EDUCATION/TRAINING PROGRAM

## 2024-10-09 PROCEDURE — 31237 NSL/SINS NDSC SURG BX POLYPC: CPT | Performed by: SPECIALIST

## 2024-10-09 PROCEDURE — 88305 TISSUE EXAM BY PATHOLOGIST: CPT | Performed by: STUDENT IN AN ORGANIZED HEALTH CARE EDUCATION/TRAINING PROGRAM

## 2024-10-09 PROCEDURE — 42826 REMOVAL OF TONSILS: CPT | Performed by: SPECIALIST

## 2024-10-09 PROCEDURE — 88341 IMHCHEM/IMCYTCHM EA ADD ANTB: CPT | Performed by: STUDENT IN AN ORGANIZED HEALTH CARE EDUCATION/TRAINING PROGRAM

## 2024-10-09 PROCEDURE — 31525 DX LARYNGOSCOPY EXCL NB: CPT | Performed by: SPECIALIST

## 2024-10-09 PROCEDURE — 88342 IMHCHEM/IMCYTCHM 1ST ANTB: CPT | Performed by: STUDENT IN AN ORGANIZED HEALTH CARE EDUCATION/TRAINING PROGRAM

## 2024-10-09 PROCEDURE — 88307 TISSUE EXAM BY PATHOLOGIST: CPT | Performed by: STUDENT IN AN ORGANIZED HEALTH CARE EDUCATION/TRAINING PROGRAM

## 2024-10-09 PROCEDURE — 88365 INSITU HYBRIDIZATION (FISH): CPT | Performed by: STUDENT IN AN ORGANIZED HEALTH CARE EDUCATION/TRAINING PROGRAM

## 2024-10-09 PROCEDURE — 88312 SPECIAL STAINS GROUP 1: CPT | Performed by: STUDENT IN AN ORGANIZED HEALTH CARE EDUCATION/TRAINING PROGRAM

## 2024-10-09 RX ORDER — LABETALOL HYDROCHLORIDE 5 MG/ML
10 INJECTION, SOLUTION INTRAVENOUS
Status: COMPLETED | OUTPATIENT
Start: 2024-10-09 | End: 2024-10-09

## 2024-10-09 RX ORDER — HYDRALAZINE HYDROCHLORIDE 20 MG/ML
10 INJECTION INTRAMUSCULAR; INTRAVENOUS ONCE
Status: COMPLETED | OUTPATIENT
Start: 2024-10-09 | End: 2024-10-09

## 2024-10-09 RX ORDER — MAGNESIUM HYDROXIDE 1200 MG/15ML
LIQUID ORAL AS NEEDED
Status: DISCONTINUED | OUTPATIENT
Start: 2024-10-09 | End: 2024-10-09 | Stop reason: HOSPADM

## 2024-10-09 RX ORDER — GLYCOPYRROLATE 0.2 MG/ML
INJECTION INTRAMUSCULAR; INTRAVENOUS AS NEEDED
Status: DISCONTINUED | OUTPATIENT
Start: 2024-10-09 | End: 2024-10-09

## 2024-10-09 RX ORDER — FENTANYL CITRATE 50 UG/ML
50 INJECTION, SOLUTION INTRAMUSCULAR; INTRAVENOUS
Status: DISCONTINUED | OUTPATIENT
Start: 2024-10-09 | End: 2024-10-09 | Stop reason: HOSPADM

## 2024-10-09 RX ORDER — OXYCODONE HCL 5 MG/5 ML
5 SOLUTION, ORAL ORAL EVERY 4 HOURS PRN
Status: DISCONTINUED | OUTPATIENT
Start: 2024-10-09 | End: 2024-10-09 | Stop reason: HOSPADM

## 2024-10-09 RX ORDER — MIDAZOLAM HYDROCHLORIDE 2 MG/2ML
INJECTION, SOLUTION INTRAMUSCULAR; INTRAVENOUS AS NEEDED
Status: DISCONTINUED | OUTPATIENT
Start: 2024-10-09 | End: 2024-10-09

## 2024-10-09 RX ORDER — LABETALOL HYDROCHLORIDE 5 MG/ML
5 INJECTION, SOLUTION INTRAVENOUS ONCE
Status: COMPLETED | OUTPATIENT
Start: 2024-10-09 | End: 2024-10-09

## 2024-10-09 RX ORDER — FENTANYL CITRATE 50 UG/ML
INJECTION, SOLUTION INTRAMUSCULAR; INTRAVENOUS AS NEEDED
Status: DISCONTINUED | OUTPATIENT
Start: 2024-10-09 | End: 2024-10-09

## 2024-10-09 RX ORDER — ACETAMINOPHEN 160 MG/5ML
650 SUSPENSION ORAL EVERY 4 HOURS PRN
Status: DISCONTINUED | OUTPATIENT
Start: 2024-10-09 | End: 2024-10-09 | Stop reason: HOSPADM

## 2024-10-09 RX ORDER — ACETAMINOPHEN 160 MG/5ML
640 LIQUID ORAL EVERY 6 HOURS PRN
Qty: 473 ML | Refills: 0 | Status: SHIPPED | OUTPATIENT
Start: 2024-10-09 | End: 2024-10-19

## 2024-10-09 RX ORDER — OXYMETAZOLINE HYDROCHLORIDE 0.05 G/100ML
SPRAY NASAL AS NEEDED
Status: DISCONTINUED | OUTPATIENT
Start: 2024-10-09 | End: 2024-10-09 | Stop reason: HOSPADM

## 2024-10-09 RX ORDER — PHENYLEPHRINE HCL IN 0.9% NACL 1 MG/10 ML
SYRINGE (ML) INTRAVENOUS AS NEEDED
Status: DISCONTINUED | OUTPATIENT
Start: 2024-10-09 | End: 2024-10-09

## 2024-10-09 RX ORDER — OXYCODONE HCL 5 MG/5 ML
5 SOLUTION, ORAL ORAL EVERY 6 HOURS PRN
Qty: 100 ML | Refills: 0 | Status: SHIPPED | OUTPATIENT
Start: 2024-10-09 | End: 2024-10-21 | Stop reason: SDUPTHER

## 2024-10-09 RX ORDER — SODIUM CHLORIDE 9 MG/ML
125 INJECTION, SOLUTION INTRAVENOUS CONTINUOUS
Status: DISCONTINUED | OUTPATIENT
Start: 2024-10-09 | End: 2024-10-09 | Stop reason: HOSPADM

## 2024-10-09 RX ORDER — LIDOCAINE HYDROCHLORIDE 20 MG/ML
INJECTION, SOLUTION EPIDURAL; INFILTRATION; INTRACAUDAL; PERINEURAL AS NEEDED
Status: DISCONTINUED | OUTPATIENT
Start: 2024-10-09 | End: 2024-10-09

## 2024-10-09 RX ORDER — ROCURONIUM BROMIDE 10 MG/ML
INJECTION, SOLUTION INTRAVENOUS AS NEEDED
Status: DISCONTINUED | OUTPATIENT
Start: 2024-10-09 | End: 2024-10-09

## 2024-10-09 RX ORDER — ONDANSETRON 2 MG/ML
4 INJECTION INTRAMUSCULAR; INTRAVENOUS EVERY 6 HOURS PRN
Status: DISCONTINUED | OUTPATIENT
Start: 2024-10-09 | End: 2024-10-09 | Stop reason: HOSPADM

## 2024-10-09 RX ORDER — ACETAMINOPHEN 500 MG
500 TABLET ORAL EVERY 6 HOURS PRN
COMMUNITY
End: 2024-10-09 | Stop reason: HOSPADM

## 2024-10-09 RX ORDER — PROPOFOL 10 MG/ML
INJECTION, EMULSION INTRAVENOUS AS NEEDED
Status: DISCONTINUED | OUTPATIENT
Start: 2024-10-09 | End: 2024-10-09

## 2024-10-09 RX ORDER — DEXAMETHASONE SODIUM PHOSPHATE 10 MG/ML
INJECTION, SOLUTION INTRAMUSCULAR; INTRAVENOUS AS NEEDED
Status: DISCONTINUED | OUTPATIENT
Start: 2024-10-09 | End: 2024-10-09

## 2024-10-09 RX ORDER — ONDANSETRON 2 MG/ML
INJECTION INTRAMUSCULAR; INTRAVENOUS AS NEEDED
Status: DISCONTINUED | OUTPATIENT
Start: 2024-10-09 | End: 2024-10-09

## 2024-10-09 RX ORDER — IBUPROFEN 100 MG/5ML
400 SUSPENSION ORAL EVERY 6 HOURS PRN
Qty: 473 ML | Refills: 0 | Status: SHIPPED | OUTPATIENT
Start: 2024-10-09 | End: 2024-10-24 | Stop reason: SDUPTHER

## 2024-10-09 RX ADMIN — LABETALOL HYDROCHLORIDE 5 MG: 5 INJECTION, SOLUTION INTRAVENOUS at 10:16

## 2024-10-09 RX ADMIN — SUGAMMADEX 200 MG: 100 INJECTION, SOLUTION INTRAVENOUS at 08:08

## 2024-10-09 RX ADMIN — LABETALOL HYDROCHLORIDE 10 MG: 5 INJECTION, SOLUTION INTRAVENOUS at 09:34

## 2024-10-09 RX ADMIN — HYDRALAZINE HYDROCHLORIDE 10 MG: 20 INJECTION, SOLUTION INTRAMUSCULAR; INTRAVENOUS at 10:35

## 2024-10-09 RX ADMIN — LIDOCAINE HYDROCHLORIDE 60 MG: 20 INJECTION, SOLUTION EPIDURAL; INFILTRATION; INTRACAUDAL at 07:27

## 2024-10-09 RX ADMIN — ROCURONIUM BROMIDE 50 MG: 10 INJECTION, SOLUTION INTRAVENOUS at 07:28

## 2024-10-09 RX ADMIN — ACETAMINOPHEN 650 MG: 650 SUSPENSION ORAL at 12:16

## 2024-10-09 RX ADMIN — SODIUM CHLORIDE 125 ML/HR: 0.9 INJECTION, SOLUTION INTRAVENOUS at 06:47

## 2024-10-09 RX ADMIN — LABETALOL HYDROCHLORIDE 10 MG: 5 INJECTION, SOLUTION INTRAVENOUS at 08:37

## 2024-10-09 RX ADMIN — MIDAZOLAM HYDROCHLORIDE 2 MG: 1 INJECTION, SOLUTION INTRAMUSCULAR; INTRAVENOUS at 07:22

## 2024-10-09 RX ADMIN — ONDANSETRON 4 MG: 2 INJECTION INTRAMUSCULAR; INTRAVENOUS at 07:56

## 2024-10-09 RX ADMIN — PROPOFOL 150 MG: 10 INJECTION, EMULSION INTRAVENOUS at 07:27

## 2024-10-09 RX ADMIN — OXYCODONE HYDROCHLORIDE 5 MG: 5 SOLUTION ORAL at 13:04

## 2024-10-09 RX ADMIN — Medication 200 MCG: at 07:32

## 2024-10-09 RX ADMIN — FENTANYL CITRATE 50 MCG: 50 INJECTION INTRAMUSCULAR; INTRAVENOUS at 07:49

## 2024-10-09 RX ADMIN — PROPOFOL 30 MG: 10 INJECTION, EMULSION INTRAVENOUS at 07:55

## 2024-10-09 RX ADMIN — PROPOFOL 30 MG: 10 INJECTION, EMULSION INTRAVENOUS at 08:04

## 2024-10-09 RX ADMIN — Medication 100 MCG: at 07:41

## 2024-10-09 RX ADMIN — DEXAMETHASONE SODIUM PHOSPHATE 10 MG: 10 INJECTION INTRAMUSCULAR; INTRAVENOUS at 07:28

## 2024-10-09 RX ADMIN — GLYCOPYRROLATE 0.2 MG: 0.2 INJECTION, SOLUTION INTRAMUSCULAR; INTRAVENOUS at 07:34

## 2024-10-09 RX ADMIN — FENTANYL CITRATE 50 MCG: 50 INJECTION INTRAMUSCULAR; INTRAVENOUS at 07:27

## 2024-10-09 NOTE — ANESTHESIA PREPROCEDURE EVALUATION
Procedure:  DIRECT LARYNGOSCOPY (Throat)  BX MASS NASOPHARYNGOSCOPY (Throat)  TONSILLECTOMY (Throat)    Relevant Problems   CARDIO   (+) Mixed hyperlipidemia   (+) Primary hypertension      GI/HEPATIC   (+) GERD (gastroesophageal reflux disease)   (+) Metastatic colon cancer to liver (HCC)      /RENAL   (+) GOGO (acute kidney injury) (HCC)   (+) Stage 3a chronic kidney disease (HCC)      GYN   (+) Malignant neoplasm of right female breast (HCC)      HEMATOLOGY   (+) Nutritional anemia   (+) Thrombocytopenia (HCC)      Endocrine   (+) Right thyroid nodule        Physical Exam    Airway    Mallampati score: I  TM Distance: >3 FB  Neck ROM: full     Dental   Comment: Poor dention     Cardiovascular  Cardiovascular exam normal    Pulmonary  Pulmonary exam normal     Other Findings  post-pubertal.      Anesthesia Plan  ASA Score- 3     Anesthesia Type- general with ASA Monitors.         Additional Monitors:     Airway Plan: ETT.           Plan Factors-Exercise tolerance (METS): >4 METS.    Chart reviewed.   Existing labs reviewed. Patient summary reviewed.    Patient is not a current smoker.      Obstructive sleep apnea risk education given perioperatively.        Induction- intravenous.    Postoperative Plan- Plan for postoperative opioid use.     Perioperative Resuscitation Plan - Level 1 - Full Code.       Informed Consent- Anesthetic plan and risks discussed with patient.

## 2024-10-09 NOTE — OP NOTE
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 Right tonsil with firm nodule. Both removed via tonsillectomy.  No other lesions identified on direct laryngoscopy.    Complications:   None    Procedure and Technique:  After positive identification, the Maira Moura was transferred onto the operating room table in the supine position.  Appropriate monitoring devices were put in place, anesthesia was induced, and the patient was intubated without difficulty.  The operating room table was turned 90 degrees, and the patient was draped in the usual clean fashion.   Before proceeding with surgery, the time-out procedure was completed.    DIRECT LARYNGOSCOPY WAS PERFORMED AS FOLLOWS:  The oral cavity and oropharynx were then digitally palpated. Right firm nodule on right tonsil palpated.  A moist gauze was put in place to protect the edentulous maxilla and a laryngoscope was then carefully introduced and use to examine the patient's oral cavity, oropharynx, hypopharynx and larynx. The laryngoscope and moist gauze were then removed.    NASOPHARYNGOSCOPY WITH BIOPSY WAS PERFORMED AS FOLLOWS:  Next, bilateral nasal cavities were sprayed with oxymetazoline. The Hernandez deanne telescope was introduced and the nasopharynx was examined. There was atypical appearing tissue visualized in the nasopharynx. This was biopsied using up biting cup forceps. Hemostasis was achieved with oxymetazoline spray.    TONSILLECTOMY WAS PERFORMED AS FOLLOWS:  A shoulder roll was placed. A McIvor oral gag was introduced opened and suspended from the edge of the Luis stand. Red rubber tubes were passed through bilateral nasal cavities and used to retract the soft palate bilaterally. The right tonsil was grasped, retracted medially and dissected free of the surrounding tissue using the Coblation wand. In a similar fashion, the left tonsil was removed, and hemostasis was accomplished in bilateral tonsillar fossae using the coagulation function of the Coblation wand. The tonsils were sent for final histopathologic analysis.    The McIvor oral gag was let down for a minute and reopened. Hemostasis was again accomplished using the coagulation function of the Coablation wand.  The red rubber tubes and the McIvor oral gag were then removed. Anesthesia was reversed. The patient was awakened, extubated and taken to the recovery room in stable condition. All counts were correct at the end of the case, and no complications were encountered.      Patient Disposition:  PACU          SIGNATURE: Chad Al MD  DATE:  October 9, 2024  TIME: 8:48 AM

## 2024-10-09 NOTE — H&P
Surgery Pre-op note/Updated History and Physical    Date of service: 10/9/71362:22 AM      No changes from most recent clinic H&P note. Patient to OR for DL, nasopharyngoscopy, left tonsillectomy.      Pmh: Reviewed  Pshx: Reviewed  Social history: Reviewed  Medications: Reviewed  ROS: as above      Vitals:    10/09/24 0618   BP: 121/58   Pulse: 58   Resp: 18   Temp: (!) 97.4 °F (36.3 °C)   SpO2: 98%       ENT: oral cavity patent  Chest/Heart/Lungs: Breathing, perfused, unremarkable  Abd: Unremarkable  Ext: Unremarkable        The procedure was discussed with the patient, including risks, benefits, and alternatives and all questions were answered. Consent signed and in the chart.    Chad Al MD PGY-4  [facility] Otolaryngology - Head and Neck Surgery  Available on Epic Secure Chat.  Please contact ENT Resident Epic Secure chat role for any questions or concerns.    10/9/2024 7:22 AM

## 2024-10-09 NOTE — DISCHARGE INSTR - AVS FIRST PAGE
Tonsillectomy and Adenoidectomy/Direct Laryngoscopy  WHAT YOU SHOULD KNOW:   A tonsillectomy is surgery to remove your tonsils. Tonsils are 2 large lumps of tissue in the back of your throat. Adenoids are small lumps of tissue on the top of your throat. Tonsils and adenoids both fight infection. Sometimes only your tonsils are removed. You also had a biopsy of your adenoids today.  20     AFTER YOU LEAVE:   Medications    Use alternating doses of Acetaminophen (Tylenol) and Ibuprofen (Motrin) every 3 hours.     Example:  9:00 am 12:00 pm 3:00 pm 6:00 pm 9:00 pm 12:00 am 3:00 am 6:00 am 9:00 am   Tylenol Motrin Tylenol Motrin Tylenol Motrin Tylenol Motrin Tylenol       Acetaminophen (Tylenol)  20 mL (of 160mg/5mL) by mouth every 6 hours  (15mg/kg/dose)    Alternating with:    Ibuprofen (Motrin)  20 mL (of 100mg/5mL) by mouth every 6 hours  (5-10mg/kg/dose, not to exceed 40 mg/kg/day)      Use ONLY as needed for breakthrough pain (patients >12 years old):    Oxycodone liquid  5 mL (of 1mg/1mL) by mouth every 6 hours as needed  (0.1mg/kg/dose)        NOTE: Do not exceed more than 2 grams of Acetaminophen in 24 hours if under 12 years old, or 3-4 grams of Acetaminophen in 24 hours if over 12 years old.  Do not take more than 1 medication containing acetaminophen (Tylenol) at the same time.     Take your medicine as directed.  Call your healthcare provider if you think your medicine is not helping or if you have side effects. Tell him if you are allergic to any medicine. Keep a list of the medicines, vitamins, and herbs you take. Include the amounts, and when and why you take them. Bring the list or the pill bottles to follow-up visits. Carry your medicine list with you in case of an emergency.  Follow up with your healthcare provider as directed:  Write down your questions so you remember to ask them during your visits.   What to expect after surgery:   Pain and swelling:  Your throat may be sore up to 2 weeks after  surgery. Your face and neck may be swollen or tender. It may be hard to turn your head.     Mild fever:  You may have a low fever while your tonsil areas heal. Drink liquids often to help reduce it.     Bleeding:  A small amount of bleeding is normal within 24 hours after surgery. Bleeding can also happen 5 to 7 days after surgery when your scabs fall off, or if you have an infection. Ask how much bleeding to expect.  Mouth care:  It is normal to have mouth pain and bad breath for a few days after surgery. Care for your mouth as follows:  Brush your teeth gently. Avoid harsh gargling or tooth brushing. This can cause bleeding.     Gently rinse your mouth as directed to remove blood and mucus.  Food and drink:  You will need to follow a liquid diet or soft food diet for several days after surgery.  Drink plenty of liquids:  This will help prevent fluid loss, keep your temperature down, decrease pain, and speed healing. Liquids and foods that are cool or cold, such as water, apple or grape juice, and popsicles, will help decrease pain and swelling. Do not drink orange juice or grapefruit juice. They may bother your throat.     Start with soft foods:  Once you can drink liquids and your stomach is not upset, you may then have soft, plain foods. Begin with foods like applesauce, oatmeal, soft-boiled eggs, mashed potatoes, gelatin, and ice cream. Once you can eat soft food easily, you may slowly begin to eat solid foods. Avoid anything hot, spicy, or sharp, such as chips. These foods can hurt your tonsil areas.     Avoid hot food and drinks:  Avoid coffee, tea, soup, and other hot or warm foods and drinks. They can increase your risk for bleeding. Avoid milk and dairy foods if you have problems with thick mucus in your throat. This can cause you to cough, which could hurt your surgery areas.  Self-care:   Use ice:  Ice helps decrease swelling and pain. Use an ice pack or put crushed ice in a plastic bag. Cover the ice  pack with a towel and place it on your throat for 15 to 20 minutes every hour for 2 days.    Use a cool humidifier:  This will help moisten the air and soothe your throat.    Get plenty of rest:  Limit your activity for 7 to 10 days after surgery. It may take 2 weeks for you to recover. Ask when you can drive or return to work.     Do not smoke or go to smoky areas:  Until you heal, smoke may cause you to cough or your throat to start bleeding heavily.     Stay away from people who have colds, sore throats, or the flu:  You may get sick more easily after surgery.  Contact your surgeon or primary healthcare provider if:   You have a fever.     You have throat pain or an earache that is worse than expected.    You have pus or blood draining down your throat.    You have itchy skin or a rash.    You have any questions or concerns about your condition or care.  Seek care immediately or call 911 if:   You have bright red bleeding from your nose or mouth, or bleeding that is worse than what you were told to expect.     You feel weak, dizzy, or like you might faint when you sit up or stand.     You have severe throat pain with drooling or voice changes.    Your neck is stiff and painful.    You have swelling or pain in your face or neck.     You have back or chest pain.    You have trouble breathing or swallowing.    Call Dr. Matthew with any questions or concerns: office [phone]; mobile [phone] (urgent issues).

## 2024-10-09 NOTE — ANESTHESIA POSTPROCEDURE EVALUATION
Post-Op Assessment Note    CV Status:  Stable    Pain management: adequate       Mental Status:  Alert and awake   Hydration Status:  Euvolemic   PONV Controlled:  Controlled   Airway Patency:  Patent     Post Op Vitals Reviewed: Yes    No anethesia notable event occurred.    Staff: Anesthesiologist           Last Filed PACU Vitals:  Vitals Value Taken Time   Temp 97.3 °F (36.3 °C) 10/09/24 0827   Pulse 70 10/09/24 0857   /81 10/09/24 0857   Resp 15 10/09/24 0857   SpO2 90 % 10/09/24 0857       Modified Marichuy:  Activity: 2 (10/9/2024  8:57 AM)  Respiration: 2 (10/9/2024  8:57 AM)  Circulation: 2 (10/9/2024  8:57 AM)  Consciousness: 1 (10/9/2024  8:57 AM)  Oxygen Saturation: 1 (10/9/2024  8:57 AM)  Modified Marichuy Score: 8 (10/9/2024  8:57 AM)

## 2024-10-09 NOTE — ANESTHESIA POSTPROCEDURE EVALUATION
Post-Op Assessment Note    CV Status:  Stable  Pain Score: 0    Pain management: adequate       Mental Status:  Arousable   Hydration Status:  Euvolemic   PONV Controlled:  Controlled   Airway Patency:  Patent     Post Op Vitals Reviewed: Yes    No anethesia notable event occurred.    Staff: CRNA           Last Filed PACU Vitals:  Vitals Value Taken Time   Temp 97.3    Pulse 76 10/09/24 0828   /91 10/09/24 0827   Resp 16 10/09/24 0828   SpO2 98 % 10/09/24 0828   Vitals shown include unfiled device data.    Modified Marichuy:  No data recorded

## 2024-10-10 ENCOUNTER — HOSPITAL ENCOUNTER (EMERGENCY)
Facility: HOSPITAL | Age: 69
Discharge: HOME | End: 2024-10-10
Attending: EMERGENCY MEDICINE
Payer: MEDICARE

## 2024-10-10 ENCOUNTER — APPOINTMENT (OUTPATIENT)
Dept: RADIATION ONCOLOGY | Facility: CLINIC | Age: 69
End: 2024-10-10
Attending: RADIOLOGY
Payer: MEDICARE

## 2024-10-10 VITALS
HEART RATE: 67 BPM | DIASTOLIC BLOOD PRESSURE: 67 MMHG | OXYGEN SATURATION: 100 % | BODY MASS INDEX: 31.78 KG/M2 | RESPIRATION RATE: 18 BRPM | SYSTOLIC BLOOD PRESSURE: 144 MMHG | WEIGHT: 196.87 LBS | TEMPERATURE: 98.7 F

## 2024-10-10 DIAGNOSIS — Z93.1 GASTROSTOMY TUBE IN PLACE (HCC): Primary | ICD-10-CM

## 2024-10-10 PROCEDURE — 77427 RADIATION TX MANAGEMENT X5: CPT | Performed by: RADIOLOGY

## 2024-10-10 PROCEDURE — 99282 EMERGENCY DEPT VISIT SF MDM: CPT

## 2024-10-10 PROCEDURE — 77386 HB NTSTY MODUL RAD TX DLVR CPLX: CPT | Performed by: RADIOLOGY

## 2024-10-10 PROCEDURE — 99284 EMERGENCY DEPT VISIT MOD MDM: CPT | Performed by: EMERGENCY MEDICINE

## 2024-10-10 PROCEDURE — 77014 CHG CT GUIDANCE RADIATION THERAPY FLDS PLACEMENT: CPT | Performed by: RADIOLOGY

## 2024-10-10 NOTE — DISCHARGE INSTRUCTIONS
Return to the ED if you develop any fevers, purulent drainage, abdominal pain, redness to abdomen, unable to use tube or other concerns

## 2024-10-10 NOTE — ED PROVIDER NOTES
"Time reflects when diagnosis was documented in both MDM as applicable and the Disposition within this note       Time User Action Codes Description Comment    10/10/2024  7:08 PM Anila Caba Add [Z93.1] Gastrostomy tube in place (HCC)           ED Disposition       ED Disposition   Discharge    Condition   Stable    Date/Time   Thu Oct 10, 2024  7:08 PM    Comment   Maira Moura discharge to home/self care.                   Assessment & Plan       Medical Decision Making  68 yo F with concern for G-tube  Discussed with IR, normal for the T-fasteners to fall off    Discussed with pt and daughter to watch drainage/for any signs of infection   Close return precautions reviewed and they are comfortable with discharge/plan    Problems Addressed:  Gastrostomy tube in place (HCC): acute illness or injury        ED Course as of 10/10/24 2110   Thu Oct 10, 2024   1907 Discussed with IR (Dr. Cheng), states that they are \"t-fasteners that we use to pexy the stomach. Usually they fall off around 2 weeks post placement. If they dont fall off then we remove them\"       Medications - No data to display    ED Risk Strat Scores                           SBIRT 20yo+      Flowsheet Row Most Recent Value   Initial Alcohol Screen: US AUDIT-C     1. How often do you have a drink containing alcohol? 0 Filed at: 10/10/2024 1809   2. How many drinks containing alcohol do you have on a typical day you are drinking?  0 Filed at: 10/10/2024 1809   3b. FEMALE Any Age, or MALE 65+: How often do you have 4 or more drinks on one occassion? 0 Filed at: 10/10/2024 1809   Audit-C Score 0 Filed at: 10/10/2024 1809   TOI: How many times in the past year have you...    Used an illegal drug or used a prescription medication for non-medical reasons? Never Filed at: 10/10/2024 1809                            History of Present Illness       Chief Complaint   Patient presents with    Feeding Tube Problem     Pt states having G tube placed on " 10/2/24. States noticed yesterday it has been leaking. Denies any pain currently. Denies n/v. No  symptoms       Past Medical History:   Diagnosis Date    Anemia     Arthritis     Body mass index (BMI) 40.0-44.9, adult (HCC)     Breast cancer (HCC) 12/17/2018    Cancer (HCC)     right breast, colon, liver, right tonsil    Cervical lymphadenopathy     CT neck on 3/30/2023- Large right level 2A lymphadenopathy as described above and suspicious for neoplasm.  Correlation with histopathology is recommended.  Mild asymmetric prominence of the right palatine tonsil with otherwise no definitive nodular enhancing lesions identified along the course of the aerodigestive tract.     5/26/23- FNA of this node was nonrevealing for tissue etiology, but it d    Encounter for screening for HIV 07/07/2022    Follow-up examination 04/04/2023    GERD (gastroesophageal reflux disease)     Hyperlipidemia     Hypertension     Obesity     Stroke (HCC)     TIA     Stroke (HCC)     TIA     Transaminitis 09/22/2021    Vasomotor rhinitis     Refilled flonase today. Stopped taking since January 2022      Past Surgical History:   Procedure Laterality Date    BREAST BIOPSY Right 11/23/2018    us guided bx cancer    BREAST SURGERY      COLONOSCOPY      ESOPHAGOSCOPY N/A 07/20/2023    Procedure: ESOPHAGOSCOPY;  Surgeon: David Chapa MD;  [facility];  Service: ENT    HEMICOLOECTOMY W/ ANASTOMOSIS Right 3/12/2024    Procedure: RIGHT COLECTOMY WITH ROBOTICS;  Surgeon: Vickie Israel MD;  [facility]: [facility];  Service: Surgical Oncology    IR BIOPSY LIVER MASS  06/27/2023    IR CRYOABLATION  6/3/2024    IR GASTROSTOMY TUBE PLACEMENT  10/2/2024    IR PORT CHECK  01/03/2024    IR PORT PLACEMENT  07/28/2023    IR PORT STRIPPING  01/09/2024    LAPAROTOMY N/A 3/12/2024    Procedure: LAPAROTOMY EXPLORATORY W/ ROBOTICS;  Surgeon: Vickie Israel MD;  [facility]: [facility];  Service: Surgical Oncology    MA BIOPSY NASOPHARYNX VISIBLE  LESION SIMPLE N/A 10/9/2024    Procedure: NASOPHARYNGOSCOPY with biospy;  Surgeon: Juanito Matthew MD;  [facility];  Service: ENT    KS BRNCC INCL FLUOR GDNCE DX W/CELL WASHG SPX N/A 2023    Procedure: BRONCHOSCOPY;  Surgeon: David Chapa MD;  [facility];  Service: ENT    KS LARYNGOSCOPY W/BIOPSY MICROSCOPE/TELESCOPE N/A 2023    Procedure: MICRODIRECT LARYNGOSCOPY WITH BIOPSY;  Surgeon: David Chapa MD;  [facility];  Service: ENT    KS LARYNGOSCOPY W/WO TRACHEOSCOPY DX EXCEPT  N/A 10/9/2024    Procedure: DIRECT LARYNGOSCOPY;  Surgeon: Juanito Matthew MD;  [facility];  Service: ENT    KS MASTECTOMY PARTIAL W/AXILLARY LYMPHADENECTOMY Right 2018    Procedure: ULTRASOUND LOCALIZED PARTIAL MASTECTOMY W/SENTINEL NODE BIOPSY POSS AXILLARY DISSECTION;  Surgeon: Zahida Coronado MD;  Location: [facility];  Service: General    KS TONSILLECTOMY PRIMARY/SECONDARY AGE 12/> N/A 10/9/2024    Procedure: TONSILLECTOMY;  Surgeon: Juanito Matthew MD;  [facility];  Service: ENT    TUBAL LIGATION      US GUIDED BREAST BIOPSY RIGHT COMPLETE Right 2018    US GUIDED INJECTION FOR RESEARCH STUDY  2018    US GUIDED INJECTION FOR RESEARCH STUDY  2018    US GUIDED INJECTION FOR RESEARCH STUDY  2019    US GUIDED LYMPH NODE BIOPSY RIGHT  2023      Family History   Problem Relation Age of Onset    Colon cancer Mother 58    Hypertension Mother     Pancreatic cancer Father 60    Hypertension Daughter     Hypertension Son       Social History     Tobacco Use    Smoking status: Never     Passive exposure: Never    Smokeless tobacco: Never    Tobacco comments:     NO TOBACCO USE   Vaping Use    Vaping status: Never Used   Substance Use Topics    Alcohol use: Never    Drug use: Never      E-Cigarette/Vaping    E-Cigarette Use Never User       E-Cigarette/Vaping Substances    Nicotine No     THC No     CBD No     Flavoring No      Other No     Unknown No       I have reviewed and agree with the history as documented.     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    Pt presents with daughter with concern for 3 pieces falling off from around G-tube  Also noticed clear/slightly blood drainage from around tube since yseterday  Reports tube has been functioning normally    Denies purulent drainage, erythema, abdominal pain, fevers, chills, etc.           Per independent review of external records:   10/2 IR G-tube placement       Review of Systems        Objective       ED Triage Vitals [10/10/24 1809]   Temperature Pulse Blood Pressure Respirations SpO2 Patient Position - Orthostatic VS   98.7 °F (37.1 °C) 67 144/67 18 100 % Sitting      Temp Source Heart Rate Source BP Location FiO2 (%) Pain Score    Oral Monitor Right arm -- --      Vitals      Date and Time Temp Pulse SpO2 Resp BP Pain Score FACES Pain Rating User   10/10/24 1809 98.7 °F (37.1 °C) 67 100 % 18 144/67 -- -- DR            Physical Exam  Vitals and nursing note reviewed.   Constitutional:       General: She is not in acute distress.     Appearance: Normal appearance. She is well-developed.   HENT:      Head: Normocephalic and atraumatic.      Nose: Nose normal.   Eyes:      Extraocular Movements: Extraocular movements intact.      Conjunctiva/sclera: Conjunctivae normal.   Cardiovascular:      Rate and Rhythm: Normal rate and regular rhythm.   Pulmonary:      Effort: Pulmonary effort is normal. No respiratory distress.   Abdominal:      General: There is no distension.      Palpations: Abdomen is soft.      Tenderness: There is no abdominal tenderness. There is no guarding or rebound.      Comments: G-tube in place, clear/slightly bloody drainage around tube, no purulent drainage, no erythema, no abdominal ttp.    Musculoskeletal:      Cervical back: Normal range of motion and neck supple.   Skin:     General: Skin is warm and dry.    Neurological:      General: No focal deficit present.      Mental Status: She is alert and oriented to person, place, and time.      Motor: No abnormal muscle tone.   Psychiatric:         Judgment: Judgment normal.                   Results Reviewed       None            No orders to display       Procedures    ED Medication and Procedure Management   Prior to Admission Medications   Prescriptions Last Dose Informant Patient Reported? Taking?   acetaminophen (TYLENOL) 160 mg/5 mL liquid   No No   Sig: Take 20 mL (640 mg total) by mouth every 6 (six) hours as needed for mild pain for up to 10 days   anastrozole (ARIMIDEX) 1 mg tablet  Self No No   Sig: Take 1 tablet (1 mg total) by mouth daily   atorvastatin (LIPITOR) 40 mg tablet   No No   Sig: Take 1 tablet (40 mg total) by mouth daily at bedtime   diphenhydramine, lidocaine, Al/Mg hydroxide, simethicone (Magic Mouthwash) SUSP   No No   Sig: Swish and swallow 10 mL every 4 (four) hours as needed for mouth pain or discomfort   docusate sodium (COLACE) 50 mg capsule  Self No No   Sig: Take 1 capsule (50 mg total) by mouth 2 (two) times a day   ergocalciferol (VITAMIN D2) 50,000 units  Self No No   Sig: Take 1 capsule (50,000 Units total) by mouth once a week   ferrous sulfate 325 (65 Fe) mg tablet  Self No No   Sig: Take 1 tablet (325 mg total) by mouth daily with breakfast   folic acid (FOLVITE) 1 mg tablet  Self No No   Sig: Take 1 tablet (1 mg total) by mouth in the morning   gabapentin (NEURONTIN) 300 mg capsule   No No   Sig: Take 1 pills in the morning, 1 pill at lunch and 2 pills before bed   hydrocortisone 1 % ointment  Self Yes No   ibuprofen (MOTRIN) 100 mg/5 mL suspension   No No   Sig: Take 20 mL (400 mg total) by mouth every 6 (six) hours as needed for moderate pain   lidocaine-prilocaine (EMLA) cream  Self No No   Sig: Apply topically as needed for mild pain   losartan (COZAAR) 50 mg tablet   No No   Sig: Take 1 tablet (50 mg total) by mouth daily    mirtazapine (REMERON) 15 mg tablet   No No   Sig: Take 1 tablet (15 mg total) by mouth daily at bedtime Patient is also on a 30 mg tablet, for a total dose of 45 mg   mirtazapine (REMERON) 30 mg tablet   No No   Sig: Take 1 tablet (30 mg total) by mouth daily at bedtime   omeprazole (PriLOSEC) 20 mg delayed release capsule  Self No No   Sig: Take 1 capsule (20 mg total) by mouth daily   ondansetron (ZOFRAN) 8 mg tablet   No No   Sig: Take 1 tablet (8 mg total) by mouth every 8 (eight) hours as needed for nausea or vomiting   oxyCODONE (ROXICODONE) 5 mg/5 mL solution   No No   Sig: Take 5 mL (5 mg total) by mouth every 6 (six) hours as needed for severe pain ((breakthrough pain)) Max Daily Amount: 20 mg   potassium chloride (Klor-Con M20) 20 mEq tablet   No No   Sig: Take 1 tablet (20 mEq total) by mouth 2 (two) times a day   prochlorperazine (COMPAZINE) 10 mg tablet   No No   Sig: Take 1 tablet (10 mg total) by mouth every 6 (six) hours as needed for nausea or vomiting   pyridoxine (VITAMIN B6) 50 mg tablet   No No   Sig: Take 1 tablet (50 mg total) by mouth daily   vitamin B-12 (VITAMIN B-12) 1,000 mcg tablet   Yes No      Facility-Administered Medications: None     Discharge Medication List as of 10/10/2024  7:09 PM        CONTINUE these medications which have NOT CHANGED    Details   acetaminophen (TYLENOL) 160 mg/5 mL liquid Take 20 mL (640 mg total) by mouth every 6 (six) hours as needed for mild pain for up to 10 days, Starting Wed 10/9/2024, Until Sat 10/19/2024 at 2359, Normal      anastrozole (ARIMIDEX) 1 mg tablet Take 1 tablet (1 mg total) by mouth daily, Starting Fri 4/12/2024, Normal      atorvastatin (LIPITOR) 40 mg tablet Take 1 tablet (40 mg total) by mouth daily at bedtime, Starting Wed 6/5/2024, Normal      diphenhydramine, lidocaine, Al/Mg hydroxide, simethicone (Magic Mouthwash) SUSP Swish and swallow 10 mL every 4 (four) hours as needed for mouth pain or discomfort, Starting Mon 9/30/2024,  Normal      docusate sodium (COLACE) 50 mg capsule Take 1 capsule (50 mg total) by mouth 2 (two) times a day, Starting Fri 10/6/2023, Normal      ergocalciferol (VITAMIN D2) 50,000 units Take 1 capsule (50,000 Units total) by mouth once a week, Starting Wed 11/22/2023, Normal      ferrous sulfate 325 (65 Fe) mg tablet Take 1 tablet (325 mg total) by mouth daily with breakfast, Starting Mon 4/1/2024, Normal      folic acid (FOLVITE) 1 mg tablet Take 1 tablet (1 mg total) by mouth in the morning, Starting Wed 11/22/2023, Normal      gabapentin (NEURONTIN) 300 mg capsule Take 1 pills in the morning, 1 pill at lunch and 2 pills before bed, Normal      hydrocortisone 1 % ointment Historical Med      ibuprofen (MOTRIN) 100 mg/5 mL suspension Take 20 mL (400 mg total) by mouth every 6 (six) hours as needed for moderate pain, Starting Wed 10/9/2024, Until Fri 11/8/2024 at 2359, Normal      lidocaine-prilocaine (EMLA) cream Apply topically as needed for mild pain, Starting Tue 12/12/2023, Normal      losartan (COZAAR) 50 mg tablet Take 1 tablet (50 mg total) by mouth daily, Starting Mon 8/12/2024, Normal      !! mirtazapine (REMERON) 15 mg tablet Take 1 tablet (15 mg total) by mouth daily at bedtime Patient is also on a 30 mg tablet, for a total dose of 45 mg, Starting Wed 6/5/2024, Normal      !! mirtazapine (REMERON) 30 mg tablet Take 1 tablet (30 mg total) by mouth daily at bedtime, Starting Wed 6/5/2024, Normal      omeprazole (PriLOSEC) 20 mg delayed release capsule Take 1 capsule (20 mg total) by mouth daily, Starting Wed 1/24/2024, Normal      ondansetron (ZOFRAN) 8 mg tablet Take 1 tablet (8 mg total) by mouth every 8 (eight) hours as needed for nausea or vomiting, Starting Tue 9/3/2024, Normal      oxyCODONE (ROXICODONE) 5 mg/5 mL solution Take 5 mL (5 mg total) by mouth every 6 (six) hours as needed for severe pain ((breakthrough pain)) Max Daily Amount: 20 mg, Starting Wed 10/9/2024, Normal      potassium chloride  (Klor-Con M20) 20 mEq tablet Take 1 tablet (20 mEq total) by mouth 2 (two) times a day, Starting Tue 9/3/2024, Normal      prochlorperazine (COMPAZINE) 10 mg tablet Take 1 tablet (10 mg total) by mouth every 6 (six) hours as needed for nausea or vomiting, Starting Tue 9/3/2024, Normal      pyridoxine (VITAMIN B6) 50 mg tablet Take 1 tablet (50 mg total) by mouth daily, Starting Tue 9/3/2024, Normal      vitamin B-12 (VITAMIN B-12) 1,000 mcg tablet Historical Med       !! - Potential duplicate medications found. Please discuss with provider.        No discharge procedures on file.  ED SEPSIS DOCUMENTATION   Time reflects when diagnosis was documented in both MDM as applicable and the Disposition within this note       Time User Action Codes Description Comment    10/10/2024  7:08 PM Anila Caba Add [Z93.1] Gastrostomy tube in place (HCC)                  Anila Caba DO  10/10/24 3999

## 2024-10-11 ENCOUNTER — APPOINTMENT (OUTPATIENT)
Dept: RADIATION ONCOLOGY | Facility: CLINIC | Age: 69
End: 2024-10-11
Attending: RADIOLOGY
Payer: MEDICARE

## 2024-10-11 ENCOUNTER — HOSPITAL ENCOUNTER (OUTPATIENT)
Dept: INFUSION CENTER | Facility: CLINIC | Age: 69
End: 2024-10-11
Payer: MEDICARE

## 2024-10-11 DIAGNOSIS — C18.9 METASTATIC COLON CANCER TO LIVER (HCC): Primary | ICD-10-CM

## 2024-10-11 DIAGNOSIS — C78.7 METASTATIC COLON CANCER TO LIVER (HCC): Primary | ICD-10-CM

## 2024-10-11 DIAGNOSIS — C09.9 RIGHT TONSILLAR SQUAMOUS CELL CARCINOMA (HCC): ICD-10-CM

## 2024-10-11 LAB
ALBUMIN SERPL BCG-MCNC: 3.5 G/DL (ref 3.5–5)
ALP SERPL-CCNC: 90 U/L (ref 34–104)
ALT SERPL W P-5'-P-CCNC: 13 U/L (ref 7–52)
ANION GAP SERPL CALCULATED.3IONS-SCNC: 6 MMOL/L (ref 4–13)
AST SERPL W P-5'-P-CCNC: 18 U/L (ref 13–39)
BASOPHILS # BLD AUTO: 0.01 THOUSANDS/ΜL (ref 0–0.1)
BASOPHILS NFR BLD AUTO: 0 % (ref 0–1)
BILIRUB SERPL-MCNC: 0.32 MG/DL (ref 0.2–1)
BUN SERPL-MCNC: 29 MG/DL (ref 5–25)
CALCIUM SERPL-MCNC: 8.5 MG/DL (ref 8.4–10.2)
CHLORIDE SERPL-SCNC: 108 MMOL/L (ref 96–108)
CO2 SERPL-SCNC: 27 MMOL/L (ref 21–32)
CREAT SERPL-MCNC: 1.08 MG/DL (ref 0.6–1.3)
EOSINOPHIL # BLD AUTO: 0.08 THOUSAND/ΜL (ref 0–0.61)
EOSINOPHIL NFR BLD AUTO: 2 % (ref 0–6)
ERYTHROCYTE [DISTWIDTH] IN BLOOD BY AUTOMATED COUNT: 17.1 % (ref 11.6–15.1)
GFR SERPL CREATININE-BSD FRML MDRD: 52 ML/MIN/1.73SQ M
GLUCOSE SERPL-MCNC: 99 MG/DL (ref 65–140)
HCT VFR BLD AUTO: 24.8 % (ref 34.8–46.1)
HGB BLD-MCNC: 8.2 G/DL (ref 11.5–15.4)
IMM GRANULOCYTES # BLD AUTO: 0.01 THOUSAND/UL (ref 0–0.2)
IMM GRANULOCYTES NFR BLD AUTO: 0 % (ref 0–2)
LYMPHOCYTES # BLD AUTO: 0.42 THOUSANDS/ΜL (ref 0.6–4.47)
LYMPHOCYTES NFR BLD AUTO: 9 % (ref 14–44)
MAGNESIUM SERPL-MCNC: 1.3 MG/DL (ref 1.9–2.7)
MCH RBC QN AUTO: 28.6 PG (ref 26.8–34.3)
MCHC RBC AUTO-ENTMCNC: 33.1 G/DL (ref 31.4–37.4)
MCV RBC AUTO: 86 FL (ref 82–98)
MONOCYTES # BLD AUTO: 0.41 THOUSAND/ΜL (ref 0.17–1.22)
MONOCYTES NFR BLD AUTO: 9 % (ref 4–12)
NEUTROPHILS # BLD AUTO: 3.77 THOUSANDS/ΜL (ref 1.85–7.62)
NEUTS SEG NFR BLD AUTO: 80 % (ref 43–75)
NRBC BLD AUTO-RTO: 0 /100 WBCS
PLATELET # BLD AUTO: 147 THOUSANDS/UL (ref 149–390)
PMV BLD AUTO: 10.4 FL (ref 8.9–12.7)
POTASSIUM SERPL-SCNC: 3.9 MMOL/L (ref 3.5–5.3)
PROT SERPL-MCNC: 7.1 G/DL (ref 6.4–8.4)
RBC # BLD AUTO: 2.87 MILLION/UL (ref 3.81–5.12)
SODIUM SERPL-SCNC: 141 MMOL/L (ref 135–147)
WBC # BLD AUTO: 4.7 THOUSAND/UL (ref 4.31–10.16)

## 2024-10-11 PROCEDURE — 77386 HB NTSTY MODUL RAD TX DLVR CPLX: CPT | Performed by: RADIOLOGY

## 2024-10-11 PROCEDURE — 77014 CHG CT GUIDANCE RADIATION THERAPY FLDS PLACEMENT: CPT | Performed by: RADIOLOGY

## 2024-10-11 PROCEDURE — 83735 ASSAY OF MAGNESIUM: CPT | Performed by: INTERNAL MEDICINE

## 2024-10-11 PROCEDURE — 80053 COMPREHEN METABOLIC PANEL: CPT | Performed by: INTERNAL MEDICINE

## 2024-10-11 PROCEDURE — 85025 COMPLETE CBC W/AUTO DIFF WBC: CPT | Performed by: INTERNAL MEDICINE

## 2024-10-11 NOTE — PROGRESS NOTES
Pt offers no new complaints, port flushed per protocol, central labs drawn per orders, next appt confirmed back Monday for chemo 10-14-24 10:30 , AVS declined.

## 2024-10-14 ENCOUNTER — APPOINTMENT (OUTPATIENT)
Dept: RADIATION ONCOLOGY | Facility: CLINIC | Age: 69
End: 2024-10-14
Attending: RADIOLOGY
Payer: MEDICARE

## 2024-10-14 ENCOUNTER — TELEPHONE (OUTPATIENT)
Dept: CARDIOLOGY CLINIC | Facility: CLINIC | Age: 69
End: 2024-10-14

## 2024-10-14 ENCOUNTER — HOSPITAL ENCOUNTER (OUTPATIENT)
Dept: INFUSION CENTER | Facility: CLINIC | Age: 69
Discharge: HOME | End: 2024-10-14
Payer: MEDICARE

## 2024-10-14 ENCOUNTER — NUTRITION (OUTPATIENT)
Dept: NUTRITION | Facility: CLINIC | Age: 69
End: 2024-10-14

## 2024-10-14 VITALS
RESPIRATION RATE: 18 BRPM | HEART RATE: 91 BPM | TEMPERATURE: 97.7 F | BODY MASS INDEX: 30.86 KG/M2 | WEIGHT: 192 LBS | DIASTOLIC BLOOD PRESSURE: 83 MMHG | HEIGHT: 66 IN | SYSTOLIC BLOOD PRESSURE: 125 MMHG

## 2024-10-14 DIAGNOSIS — Z71.3 NUTRITIONAL COUNSELING: Primary | ICD-10-CM

## 2024-10-14 DIAGNOSIS — C09.9 RIGHT TONSILLAR SQUAMOUS CELL CARCINOMA (HCC): Primary | ICD-10-CM

## 2024-10-14 PROCEDURE — 77014 CHG CT GUIDANCE RADIATION THERAPY FLDS PLACEMENT: CPT | Performed by: RADIOLOGY

## 2024-10-14 PROCEDURE — 96413 CHEMO IV INFUSION 1 HR: CPT

## 2024-10-14 PROCEDURE — 96367 TX/PROPH/DG ADDL SEQ IV INF: CPT

## 2024-10-14 PROCEDURE — 96366 THER/PROPH/DIAG IV INF ADDON: CPT

## 2024-10-14 PROCEDURE — 77386 HB NTSTY MODUL RAD TX DLVR CPLX: CPT | Performed by: RADIOLOGY

## 2024-10-14 RX ORDER — MAGNESIUM SULFATE HEPTAHYDRATE 40 MG/ML
4 INJECTION, SOLUTION INTRAVENOUS ONCE
Status: COMPLETED | OUTPATIENT
Start: 2024-10-14 | End: 2024-10-14

## 2024-10-14 RX ORDER — SODIUM CHLORIDE 9 MG/ML
20 INJECTION, SOLUTION INTRAVENOUS ONCE
Status: COMPLETED | OUTPATIENT
Start: 2024-10-14 | End: 2024-10-14

## 2024-10-14 RX ADMIN — MAGNESIUM SULFATE HEPTAHYDRATE 4 G: 40 INJECTION, SOLUTION INTRAVENOUS at 13:18

## 2024-10-14 RX ADMIN — DEXAMETHASONE SODIUM PHOSPHATE: 10 INJECTION, SOLUTION INTRAMUSCULAR; INTRAVENOUS at 11:20

## 2024-10-14 RX ADMIN — SODIUM CHLORIDE 500 ML: 0.9 INJECTION, SOLUTION INTRAVENOUS at 13:20

## 2024-10-14 RX ADMIN — CISPLATIN 59.1 MG: 1 INJECTION, SOLUTION INTRAVENOUS at 12:14

## 2024-10-14 RX ADMIN — FOSAPREPITANT 150 MG: 150 INJECTION, POWDER, LYOPHILIZED, FOR SOLUTION INTRAVENOUS at 11:42

## 2024-10-14 RX ADMIN — SODIUM CHLORIDE 500 ML: 9 INJECTION, SOLUTION INTRAVENOUS at 11:19

## 2024-10-14 RX ADMIN — SODIUM CHLORIDE 20 ML/HR: 9 INJECTION, SOLUTION INTRAVENOUS at 11:18

## 2024-10-14 NOTE — TELEPHONE ENCOUNTER
Left message for patient to call to schedule a follow-up as she missed her cardio-onc follow-up on 10/11/24.

## 2024-10-14 NOTE — PROGRESS NOTES
Pt presents for cisplatin. No new meds or concerns. Pt tolerated treatment without adverse reaction. Future appointments discussed, confirmed with patient for 10/18/2024 1030. AVS given.

## 2024-10-14 NOTE — TELEPHONE ENCOUNTER
----- Message from Micheline LEMUS sent at 10/11/2024  3:17 PM EDT -----  Regarding: Appointment  Good afternoon,                     Patient missed her appointment today 10/11/2024.    Thank you

## 2024-10-14 NOTE — PROGRESS NOTES
Outpatient Oncology Nutrition Consultation   Type of Consult: Follow Up   Care Location: [facility]    Reason for referral: Received notification by [facility] on 8/21/24 that pt has triggered for oncology nutrition care (reason for referral: HNC dx and Malnutrition Screening Tool (MST) Triggers: scored a 0 indicating No recent wt loss and is not eating poorly due to a decreased appetite. 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  - grade 2  - %  - AR 95%  - HER2 negative     12/20/2018 Surgery    Right partial mastectomy and SLN biopsy:  Infiltrating ductal carcinoma, Grade 2/3, felt to be between 2.0 cm and 2.5 cm in greatest dimension  - No invasive tumor is seen at inked margins, but there is a focus of DCIS seen within approximately 1mm of the inked medial margin  - no LVI  - no LCIS  - 0/2 LN's  at least Stage IIA - pT2, pN0, cM0, G2.    - Dr Coronado     12/20/2018 -  Cancer Staged    Stage IIA - pT2, pN0, cM0, G2.       1/4/2019 Genomic Testing    Oncotype DX score: 13     2/1/2019 -  Hormone Therapy    anastrozole 1 mg daily as adjuvant endocrine therapy    - Dr Daley       2/14/2019 -  "4/3/2019 Radiation    Course: C1    Plan ID Energy Fractions Dose per Fraction (cGy) Dose Correction (cGy) Total Dose Delivered (cGy) Elapsed Days   R Breast 10X/6X 25 / 25 200 0 5,000 40   R BRST BOOST 16E 5 / 5 200 0 1,000 7      Treatment dates:  C1: 2/14/2019 - 4/3/2019       Metastatic colon cancer to liver (HCC)   7/6/2023 Genetic Testing    CARIS Results:   KRAS Pathogenic Variant Exon 2  p.G12D : lack of benefit of cetuximab, panitumumab Level 2   No other actionable mutation; MSI stable; TMB low   MiFOLOXAi Results: \"Decreased Benefit of FOLFOX + Bevacizumab in first line metastatic CRC.  This patient may achieve improved results by receiving an alternative to FOLFOX, such as FOLFIRI, as their initial regimen. As an adjustment to frontline FOLFOXIRI following toxicity: This patient may achieve improved results by removing the oxaliplatin portion of their regimen\".         7/11/2023 Initial Diagnosis    Metastatic colon cancer to liver (HCC)     7/13/2023 -  Cancer Staged    Staging form: Colon and Rectum, AJCC 8th Edition  - Clinical stage from 7/13/2023: cT3, cN0, pM1 - Signed by Harika Medina DO on 9/28/2023  Total positive nodes: 0       7/31/2023 - 8/1/2024 Chemotherapy    cyanocobalamin, 1,000 mcg, Intramuscular, Every 30 days, 7 of 9 cycles  Administration: 1,000 mcg (5/9/2024), 1,000 mcg (5/24/2024), 1,000 mcg (6/20/2024), 1,000 mcg (7/3/2024), 1,000 mcg (7/18/2024), 1,000 mcg (8/1/2024)  potassium chloride, 20 mEq, Intravenous, Once, 2 of 2 cycles  Administration: 20 mEq (11/6/2023), 20 mEq (11/20/2023)  alteplase (CATHFLO), 2 mg, Intracatheter, Every 1 Minute as needed, 21 of 23 cycles  Administration: 2 mg (1/3/2024)  pegfilgrastim (NEULASTA), 6 mg, Subcutaneous, Once, 12 of 12 cycles  Administration: 6 mg (10/25/2023), 6 mg (11/8/2023), 6 mg (11/22/2023), 6 mg (12/6/2023), 6 mg (12/20/2023), 6 mg (1/12/2024), 6 mg (1/25/2024), 6 mg (4/25/2024), 6 mg (5/9/2024), 6 mg (5/24/2024), 6 mg " (6/20/2024)  fluorouracil (ADRUCIL), 895 mg, Intravenous, Once, 21 of 23 cycles  Dose modification: 320 mg/m2 (original dose 400 mg/m2, Cycle 11)  Administration: 900 mg (7/31/2023), 900 mg (8/14/2023), 900 mg (8/28/2023), 900 mg (9/11/2023), 900 mg (9/25/2023), 900 mg (10/9/2023), 900 mg (10/23/2023), 895 mg (11/6/2023), 895 mg (11/20/2023), 895 mg (12/4/2023), 700 mg (12/18/2023), 680 mg (1/10/2024), 680 mg (1/23/2024), 625 mg (4/23/2024), 625 mg (5/7/2024), 625 mg (5/22/2024), 625 mg (6/4/2024), 625 mg (6/18/2024), 625 mg (7/1/2024), 625 mg (7/16/2024), 625 mg (7/30/2024)  nivolumab (OPDIVO) IVPB, 240 mg (100 % of original dose 240 mg), Intravenous, Once, 13 of 15 cycles  Dose modification: 240 mg (original dose 240 mg, Cycle 9)  Administration: 240 mg (11/20/2023), 240 mg (12/4/2023), 240 mg (12/18/2023), 240 mg (1/10/2024), 240 mg (1/23/2024), 240 mg (4/23/2024), 240 mg (5/7/2024), 240 mg (5/22/2024), 240 mg (6/4/2024), 240 mg (6/18/2024), 240 mg (7/1/2024), 240 mg (7/16/2024), 240 mg (7/30/2024)  leucovorin calcium IVPB, 896 mg, Intravenous, Once, 21 of 23 cycles  Administration: 900 mg (7/31/2023), 900 mg (8/14/2023), 900 mg (8/28/2023), 900 mg (9/11/2023), 900 mg (9/25/2023), 900 mg (10/9/2023), 900 mg (10/23/2023), 900 mg (11/6/2023), 900 mg (11/20/2023), 900 mg (12/4/2023), 900 mg (12/18/2023), 850 mg (1/10/2024), 850 mg (1/23/2024), 800 mg (4/23/2024), 800 mg (5/7/2024), 800 mg (5/22/2024), 800 mg (6/4/2024), 800 mg (6/18/2024), 800 mg (7/1/2024), 800 mg (7/16/2024), 800 mg (7/30/2024)  oxaliplatin (ELOXATIN) chemo infusion, 85 mg/m2 = 190.4 mg, Intravenous, Once, 12 of 12 cycles  Dose modification: 68 mg/m2 (original dose 85 mg/m2, Cycle 11, Reason: Other (Must fill in a comment), Comment: increased fatigue)  Administration: 190.4 mg (7/31/2023), 190.4 mg (8/14/2023), 200 mg (8/28/2023), 200 mg (9/11/2023), 200 mg (9/25/2023), 200 mg (10/9/2023), 200 mg (10/23/2023), 200 mg (11/6/2023), 200 mg  (11/20/2023), 190.4 mg (12/4/2023), 150 mg (12/18/2023), 150 mg (1/10/2024)  fluorouracil (ADRUCIL) ambulatory infusion Soln, 1,200 mg/m2/day = 5,375 mg, Intravenous, Over 46 hours, 21 of 23 cycles     9/26/2023 -  Cancer Staged    Staging form: Colon and Rectum, AJCC 8th Edition  - Pathologic: pT3, pN0, cM1 - Signed by Vickie Israel MD on 4/3/2024  Total positive nodes: 0       3/12/2024 Surgery    A. Terminal ileum, appendix, right colon (right hemicolectomy):  - Adenocarcinoma (5.2cm)  - Forty-nine (49) lymph nodes negative for carcinoma (0/49)    - Acellular mucin present in one (1) lymph node   - See staging synoptic (ypT3N0)     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HCC)   7/27/2023 Initial Diagnosis    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HCC)     7/27/2023 -  Cancer Staged    Staging form: Pharynx - Oropharynx, AJCC 8th Edition  - Clinical stage from 7/27/2023: Stage III (cT1, cN3, cM0, p16+) - Signed by Evan Plummer MD on 7/27/2023  Stage prefix: Initial diagnosis       9/17/2024 -  Chemotherapy    alteplase (CATHFLO), 2 mg, Intracatheter, Every 1 Minute as needed, 5 of 7 cycles  CISplatin (PLATINOL) infusion, 70 mg (original dose 40 mg/m2), Intravenous, Once, 5 of 7 cycles  Dose modification: 70 mg (original dose 40 mg/m2, Cycle 1, Reason: Anticipated Tolerance), 30 mg/m2 (original dose 40 mg/m2, Cycle 4, Reason: Neuropathy, Comment: dose reduction to 30 mg/m2 due to neuropathy)  Administration: 70 mg (9/17/2024), 70 mg (9/23/2024), 70 mg (9/30/2024), 59.1 mg (10/7/2024)  sodium chloride, 500 mL, Intravenous, Once, 5 of 7 cycles  Administration: 500 mL (9/17/2024), 500 mL (9/17/2024), 500 mL (9/23/2024), 500 mL (9/23/2024), 500 mL (9/30/2024), 500 mL (9/30/2024), 500 mL (10/7/2024), 500 mL (10/7/2024)  fosaprepitant (EMEND) IVPB, 150 mg, Intravenous, Once, 5 of 7 cycles  Administration: 150 mg (9/17/2024), 150 mg (9/23/2024), 150 mg (9/30/2024), 150 mg (10/7/2024)       Past Medical &  Surgical Hx:   Patient Active Problem List   Diagnosis    Primary hypertension    Mixed hyperlipidemia    Malignant neoplasm of right female breast (HCC)    Vitamin D deficiency    Prediabetes    Right thyroid nodule    GERD (gastroesophageal reflux disease)    Obesity    Metastatic colon cancer to liver (HCC)    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HCC)    Poor appetite    Nutritional anemia    Dehydration    Drug-induced constipation    Chemotherapy induced neutropenia (HCC)    Stage 3a chronic kidney disease (HCC)    Thrombocytopenia (HCC)    Neuropathy    Diastolic dysfunction    Hypokalemia    Leukemoid reaction    GOGO (acute kidney injury) (HCC)    Acute post-operative pain    At risk for constipation    At risk for delirium    Advance care planning    Physical deconditioning    History of CVA (cerebrovascular accident)    Chronic nasal congestion    Elevated LFTs    Vitamin B12 deficiency     Past Medical History:   Diagnosis Date    Anemia     Arthritis     Body mass index (BMI) 40.0-44.9, adult (HCC)     Breast cancer (HCC) 12/17/2018    Cancer (HCC)     right breast, colon, liver, right tonsil    Cervical lymphadenopathy     CT neck on 3/30/2023- Large right level 2A lymphadenopathy as described above and suspicious for neoplasm.  Correlation with histopathology is recommended.  Mild asymmetric prominence of the right palatine tonsil with otherwise no definitive nodular enhancing lesions identified along the course of the aerodigestive tract.     5/26/23- FNA of this node was nonrevealing for tissue etiology, but it d    Encounter for screening for HIV 07/07/2022    Follow-up examination 04/04/2023    GERD (gastroesophageal reflux disease)     Hyperlipidemia     Hypertension     Obesity     Stroke (HCC)     TIA     Stroke (HCC)     TIA     Transaminitis 09/22/2021    Vasomotor rhinitis     Refilled flonase today. Stopped taking since January 2022     Past Surgical History:   Procedure Laterality Date     BREAST BIOPSY Right 2018    us guided bx cancer    BREAST SURGERY      COLONOSCOPY      ESOPHAGOSCOPY N/A 2023    Procedure: ESOPHAGOSCOPY;  Surgeon: David Chapa MD;  [facility];  Service: ENT    HEMICOLOECTOMY W/ ANASTOMOSIS Right 3/12/2024    Procedure: RIGHT COLECTOMY WITH ROBOTICS;  Surgeon: Vickie Israel MD;  [facility];  Service: Surgical Oncology    IR BIOPSY LIVER MASS  2023    IR CRYOABLATION  6/3/2024    IR GASTROSTOMY TUBE PLACEMENT  10/2/2024    IR PORT CHECK  2024    IR PORT PLACEMENT  2023    IR PORT STRIPPING  2024    LAPAROTOMY N/A 3/12/2024    Procedure: LAPAROTOMY EXPLORATORY W/ ROBOTICS;  Surgeon: Vickie Israel MD;  [facility];  Service: Surgical Oncology    CO BIOPSY NASOPHARYNX VISIBLE LESION SIMPLE N/A 10/9/2024    Procedure: NASOPHARYNGOSCOPY with biospy;  Surgeon: Juanito Matthew MD;  [facility];  Service: ENT    CO BRNCHSC INCL FLUOR GDNCE DX W/CELL WASHG SPX N/A 2023    Procedure: BRONCHOSCOPY;  Surgeon: David Chapa MD;  [facility];  Service: ENT    CO LARYNGOSCOPY W/BIOPSY MICROSCOPE/TELESCOPE N/A 2023    Procedure: MICRODIRECT LARYNGOSCOPY WITH BIOPSY;  Surgeon: David Chapa MD;  [facility];  Service: ENT    CO LARYNGOSCOPY W/WO TRACHEOSCOPY DX EXCEPT  N/A 10/9/2024    Procedure: DIRECT LARYNGOSCOPY;  Surgeon: Juanito Matthew MD;  [facility];  Service: ENT    CO MASTECTOMY PARTIAL W/AXILLARY LYMPHADENECTOMY Right 2018    Procedure: ULTRASOUND LOCALIZED PARTIAL MASTECTOMY W/SENTINEL NODE BIOPSY POSS AXILLARY DISSECTION;  Surgeon: Zahida Coronado MD;  Location: [facility];  Service: General    CO TONSILLECTOMY PRIMARY/SECONDARY AGE 13/> N/A 10/9/2024    Procedure: TONSILLECTOMY;  Surgeon: Juanito Matthew MD;  [facility];  Service: ENT    TUBAL LIGATION      US GUIDED BREAST BIOPSY RIGHT COMPLETE Right 2018     US GUIDED INJECTION FOR RESEARCH STUDY  12/20/2018    US GUIDED INJECTION FOR RESEARCH STUDY  12/07/2018    US GUIDED INJECTION FOR RESEARCH STUDY  05/03/2019    US GUIDED LYMPH NODE BIOPSY RIGHT  05/26/2023       Review of Medications:   Vitamins, Supplements and Herbals: No, pt denies taking supplements    Current Outpatient Medications:     acetaminophen (TYLENOL) 160 mg/5 mL liquid, Take 20 mL (640 mg total) by mouth every 6 (six) hours as needed for mild pain for up to 10 days, Disp: 473 mL, Rfl: 0    anastrozole (ARIMIDEX) 1 mg tablet, Take 1 tablet (1 mg total) by mouth daily, Disp: 90 tablet, Rfl: 3    atorvastatin (LIPITOR) 40 mg tablet, Take 1 tablet (40 mg total) by mouth daily at bedtime, Disp: 30 tablet, Rfl: 2    diphenhydramine, lidocaine, Al/Mg hydroxide, simethicone (Magic Mouthwash) SUSP, Swish and swallow 10 mL every 4 (four) hours as needed for mouth pain or discomfort, Disp: 480 mL, Rfl: 1    docusate sodium (COLACE) 50 mg capsule, Take 1 capsule (50 mg total) by mouth 2 (two) times a day, Disp: 90 capsule, Rfl: 1    ergocalciferol (VITAMIN D2) 50,000 units, Take 1 capsule (50,000 Units total) by mouth once a week, Disp: 90 capsule, Rfl: 3    ferrous sulfate 325 (65 Fe) mg tablet, Take 1 tablet (325 mg total) by mouth daily with breakfast, Disp: 30 tablet, Rfl: 0    folic acid (FOLVITE) 1 mg tablet, Take 1 tablet (1 mg total) by mouth in the morning, Disp: 90 tablet, Rfl: 3    gabapentin (NEURONTIN) 300 mg capsule, Take 1 pills in the morning, 1 pill at lunch and 2 pills before bed, Disp: 120 capsule, Rfl: 3    hydrocortisone 1 % ointment, , Disp: , Rfl:     ibuprofen (MOTRIN) 100 mg/5 mL suspension, Take 20 mL (400 mg total) by mouth every 6 (six) hours as needed for moderate pain, Disp: 473 mL, Rfl: 0    lidocaine-prilocaine (EMLA) cream, Apply topically as needed for mild pain, Disp: 30 g, Rfl: 3    losartan (COZAAR) 50 mg tablet, Take 1 tablet (50 mg total) by mouth daily, Disp: 90  tablet, Rfl: 5    mirtazapine (REMERON) 15 mg tablet, Take 1 tablet (15 mg total) by mouth daily at bedtime Patient is also on a 30 mg tablet, for a total dose of 45 mg, Disp: 30 tablet, Rfl: 3    mirtazapine (REMERON) 30 mg tablet, Take 1 tablet (30 mg total) by mouth daily at bedtime, Disp: 30 tablet, Rfl: 3    omeprazole (PriLOSEC) 20 mg delayed release capsule, Take 1 capsule (20 mg total) by mouth daily, Disp: 30 capsule, Rfl: 5    ondansetron (ZOFRAN) 8 mg tablet, Take 1 tablet (8 mg total) by mouth every 8 (eight) hours as needed for nausea or vomiting, Disp: 90 tablet, Rfl: 2    oxyCODONE (ROXICODONE) 5 mg/5 mL solution, Take 5 mL (5 mg total) by mouth every 6 (six) hours as needed for severe pain ((breakthrough pain)) Max Daily Amount: 20 mg, Disp: 100 mL, Rfl: 0    potassium chloride (Klor-Con M20) 20 mEq tablet, Take 1 tablet (20 mEq total) by mouth 2 (two) times a day, Disp: 90 tablet, Rfl: 1    prochlorperazine (COMPAZINE) 10 mg tablet, Take 1 tablet (10 mg total) by mouth every 6 (six) hours as needed for nausea or vomiting, Disp: 90 tablet, Rfl: 2    pyridoxine (VITAMIN B6) 50 mg tablet, Take 1 tablet (50 mg total) by mouth daily, Disp: 90 tablet, Rfl: 3    vitamin B-12 (VITAMIN B-12) 1,000 mcg tablet, , Disp: , Rfl:   No current facility-administered medications for this visit.    Facility-Administered Medications Ordered in Other Visits:     alteplase (CATHFLO) injection 2 mg, 2 mg, Intracatheter, Q1MIN PRN, Harika Agostino, DO    CISplatin (PLATINOL) 59.1 mg in sodium chloride 0.9 % 250 mL infusion, 30 mg/m2 (Treatment Plan Recorded), Intravenous, Once, Harika Agostino, DO    fosaprepitant (EMEND) 150 mg in sodium chloride 0.9 % 250 mL IVPB, 150 mg, Intravenous, Once, Harika Agostino, DO, Last Rate: 500 mL/hr at 10/14/24 1142, 150 mg at 10/14/24 1142    magnesium sulfate 4 g/100 mL IVPB (premix) 4 g, 4 g, Intravenous, Once, Harika Medina,     sodium chloride 0.9 % bolus 500 mL, 500 mL,  "Intravenous, Once, Harika Agostino, DO    sodium chloride 0.9 % bolus 500 mL, 500 mL, Intravenous, Once, Harika Agostino, DO, Last Rate: 500 mL/hr at 10/14/24 1119, 500 mL at 10/14/24 1119    Most Recent Lab Results:   Lab Results   Component Value Date    WBC 4.70 10/11/2024    NEUTROABS 3.77 10/11/2024    IRON 55 05/07/2024    TIBC 292 05/07/2024    FERRITIN 145 05/07/2024    CHOLESTEROL 167 04/24/2024    TRIG 137 04/24/2024    HDL 43 (L) 04/24/2024    LDLCALC 97 04/24/2024    ALT 13 10/11/2024    AST 18 10/11/2024    ALB 3.5 10/11/2024    SODIUM 141 10/11/2024    SODIUM 139 10/04/2024    K 3.9 10/11/2024    K 4.1 10/04/2024     10/11/2024    BUN 29 (H) 10/11/2024    BUN 24 10/04/2024    CREATININE 1.08 10/11/2024    CREATININE 1.14 10/04/2024    EGFR 52 10/11/2024    PHOS 1.7 (L) 03/15/2024    PHOS 2.4 03/14/2024    TSH 1.58 01/03/2022    GLUCOSE 209 (H) 03/12/2024    POCGLU 101 08/05/2024    GLUF 95 09/13/2024    GLUF 101 (H) 06/03/2024    GLUC 99 10/11/2024    HGBA1C 5.5 02/21/2024    HGBA1C 5.9 (H) 06/13/2023    HGBA1C 6.1 (H) 07/09/2022    CALCIUM 8.5 10/11/2024    FOLATE >20.0 (H) 05/23/2019    MG 1.3 (L) 10/11/2024       Anthropometric Measurements:   Height: 66\"  Ht Readings from Last 1 Encounters:   10/14/24 5' 5.98\" (1.676 m)     Wt Readings from Last 20 Encounters:   10/14/24 87.1 kg (192 lb)   10/10/24 89.3 kg (196 lb 13.9 oz)   10/09/24 89.4 kg (197 lb 1.5 oz)   10/07/24 87 kg (191 lb 12.8 oz)   10/02/24 88.9 kg (195 lb 15.8 oz)   10/01/24 89.4 kg (197 lb)   09/30/24 88 kg (194 lb 0.1 oz)   09/23/24 88.2 kg (194 lb 7.1 oz)   09/17/24 90 kg (198 lb 6.6 oz)   09/13/24 88.5 kg (195 lb)   09/10/24 89.8 kg (198 lb)   09/03/24 89.8 kg (198 lb)   08/20/24 92.2 kg (203 lb 3.2 oz)   08/16/24 90.3 kg (199 lb)   08/06/24 88.9 kg (196 lb)   07/30/24 87.8 kg (193 lb 9 oz)   07/16/24 89 kg (196 lb 3.4 oz)   07/12/24 87.7 kg (193 lb 6.4 oz)   07/03/24 87.5 kg (193 lb)   07/01/24 86.8 kg (191 lb 5.8 oz) "       Weight History:   Usual Weight: 275#  Varian: (2/22/19) 264.6#, (4/2/19) 263.4, (9/16/24) 197.4#, (9/23/24) 194#, (9/30/24) 192.8#, (10/7/24) 190.2#, (10/11/24) 194.6#, (10/14/24) 191#  Home Scale: n/a    Oncology Nutrition-Anthropometrics      Flowsheet Row Nutrition from 10/14/2024 in [facility] Nutrition from 10/7/2024 in [facility]   Patient [age] (years): 69 years 69 years   Patient (female) height (in): 66 in 66 in   Current Weight to be used for anthropometric calculations (lbs) 192 lbs 191.8 lbs   Current Weight to be used for anthropometric calculations (kg) 87.3 kg 87.2 kg   BMI: 31 31   IBW female: 130 lbs 130 lbs   IBW (kg) female: 59.1 kg 59.1 kg   IBW % (female) 147.7 % 147.5 %   Adjusted BW (female): 145.5 lbs 145.5 lbs   Adjusted BW kg (female): 66.1 kg 66.1 kg   % weight change after 1 week: 0.1 % -2.6 %   Weight change after 1 week (lbs) 0.2 lbs -5.2 lbs   % weight change after 1 month: -1.5 % -3.1 %   Weight change after 1 month (lbs) -3 lbs -6.2 lbs   % weight change after 3 months: -2.1 % -2.3 %   Weight change after 3 months (lbs) -4.2 lbs -4.6 lbs            Nutrition-Focused Physical Findings: none observed    Food/Nutrition-Related History & Client/Social History:    Current Nutrition Impact Symptoms:  [] Nausea  [x] Reduced Appetite  [] Acid Reflux    [] Vomiting  [x] Unintended Wt Loss [] Malabsorption    [x] Diarrhea -past two days. No BM today [] Unintended Wt Gain  [] Dumping Syndrome    [] Constipation  [] Thick Mucous/Secretions  [] Abdominal Pain    [] Dysgeusia (Altered Taste)  [x] Xerostomia (Dry Mouth)  [] Gas    [] Dysosmia (Altered Smell)  [] Thrush  [] Difficulty Chewing    [] Oral Mucositis (Sore Mouth)  [x] Fatigue  [] Hyperglycemia   Lab Results   Component Value Date    HGBA1C 5.5 02/21/2024      [x] Odynophagia -more painful recently. Not taking much PO anymore [] Esophagitis  [] Other:     [x] Dysphagia  [] Early Satiety  [] No Problems Eating      Food Allergies & Intolerances: no    Current Diet: Full Liquids and Tube Feeding  Current Nutrition Intake:  PEG feeds have increased since last visit, PO intake decreased  Appetite: Fair   Nutrition Route: PO and PEG  Oral Care: brushes daily  Activity level: ADLs    24 Hr Diet Recall:   Breakfast: Ensure complete  Snack: nothing  Lunch: nothing  Snack: nothing  Dinner: Ensure Complete  Snack: nothing    Beverages: water (sips throughout the day), Ensure (10oz x2)  Supplements:   Ensure Complete (10 oz, 350 kcal, 30 g pro) 2 times daily    EN Recall:  DME: [employer]  Access Type: PEG  Formula: Jevity 1.5  Method: Bolus  Regimen: 16oz (474 mL) 2 times per day of Jevity 1.5 via PEG (gravity syringe method to administer boluses; 1 container infusing over ~15-20 minutes).  Flush: 60 mL free water flush post each bolus (60 mL x 2 boluses = 120 mL  EN providin mL volume (4 cartons/day), 1422 kcal (71% est needs), 61g protein (76% est needs), 721 mL free water, 841mL total water (42% est needs). (Total fluid intake with 2 Ensure shakes = 1321 mL)      Oncology Nutrition-Estimated Needs      Flowsheet Row Nutrition from 2024 in [facility] Diet[facility] Nutrition from 2024 in [facility]   Weight type used Adjusted weight Actual weight   Weight in kilograms (kg) used for estimated needs 66.4 kg 90 kg   Energy needs formula:  30-35 kcal/kg 30-35 kcal/kg   Energy needs based on 30 kcal/k kcal 2700 kcal   Energy needs based on 35 kcal/k kcal 3150 kcal   Protein needs formula: 1.2-1.5 g/kg 1.2-1.5 g/kg   Protein needs based on 1.2 g/k g 108 g   Protein needs based on 1.5 g/kg 100 g 135 g   Fluid needs formula: 30-35 mL/kg 30-35 mL/kg   Fluid needs based on 30 mL/kg 1992 mL 2700 mL   Fluid needs in ounces 67 oz 91 oz   Fluid needs based on 35 mL/kg 2324 mL 3150 mL    Fluid needs in ounces 79 oz 106 oz             Discussion & Intervention:   Maira was evaluated today for an RD follow up regarding HNC.  Maira is currently undergoing tx for HNC .  Met with Maira today during her infusion. She started using her PEG for nutrition and reports this is going well. She has been doing 4 cartons of jevity 1.5 daily. She does 2 at a time- once in the morning, once in the evening. She is also drinking Ensure complete shakes, 2 daily. With drinking Ensure, she is meeting her calorie/protein needs. Discussed TF goal if she stops drinking Ensure orally. We also discussed fluid needs as she has not been flushing her tube before feeds, just after. She does not do additional flushes throughout the day and her water intake is minimal. Provided her with her fluid needs. Weight is stable over the last week.   Reviewed 24 hour recall, which revealed an suboptimal po intake and enteral intake, and discussed ways to optimize nutrient intake and increase fluid intake.  Also reviewed the importance of wt management throughout the tx process and the role of enteral nutrition in managing wt and overall health.  Based on today's assessment, discussion included: MNT for:  xerostomia, odynophagia, dysphagia, fluid, enteral nutrition, how to modify foods for anticipated nutrition impact symptoms pt may experience during CA tx, practicing proper oral care, adequate hydration & fluid choices, sipping on calorie containing beverages (examples include: adding whole milk or cream to coffee, oral nutrition supplements, juice, electrolyte replacement beverages, milk, etc.), continuing oral nutrition supplements, practicing proper feeding tube use & care, adherence to and compliance with enteral nutrition plan of care, and individualized enteral nutrition recommendations & plan:   .   Moving forward, Maira was encouraged to increase fluid intake via PEG    Materials Provided: not applicable  All  questions and concerns addressed during today’s visit.  Maira has RD contact information.    Nutrition Diagnosis:   Inadequate Energy Intake related to physiological causes, disease state and treatment related issues as evidenced by food recall, wt loss and discussion with pt and/or family.  Increased Nutrient Needs (kcal & pro) related to increased demand for nutrients and disease state as evidenced by cancer dx and pt undergoing tx for cancer.  Swallowing Difficulty related to diagnosis/treatment related causes as evidenced by RT tx, need for feeding tube.  Monitoring & Evaluation:   Goals:  weight maintenance/stabilization  adequate nutrition impact symptom management  pt to meet >/=75% estimated nutrition needs daily  achieve tube feeding goal rate  increase fluid intake    Progress Towards Goals: Progressing and New Goal(s) Added    Nutrition Rx & Recommendations:  Diet: enteral nutrition as 1' source of nutrition  Stay hydrated by sipping fluids of choice/tolerance throughout the day.    Follow proper oral care; Try baking soda/salt water rinse recipe (mix 3/4 tsp salt + 1 tsp baking soda + 1 qt water; rinse with plain water after using) in Eating Hints book (pg 18).  Brush your teeth before/after meals & before bed.  Weigh yourself regularly. If you notice weight loss, make an effort to increase your daily food/calorie intake. If you continue to notice loss after these efforts, reach out to your dietitian to establish a plan to stabilize weight.   Continue 2 Ensure complete shakes as able.  TF plan - continue 4 cartons of Jevity 1.5 daily + consume 2 Ensure complete shakes daily.  If unable to consume orally, increase TF to goal rate.  TF Goal: 1 container (237 mL) 5 times per day + 1.5 containers (355 mL) once/day of Jevity 1.5 via PEG (push syringe or gravity syringe method to administer boluses; 1 container to infuse over ~15-20 minutes). (Total of 6.5 cartons/day)  Water Flushin mL free water  flush pre/post each bolus (240 mL water) + additional 150 mL water flushes 4 times daily (600 mL water). If you stop drinking Ensure shakes orally, may need to adjust water flushes.  Allow for substitutions  Your syringes are each 60 mL or 2 fl oz.  Always flush your feeding tube with 60 mL room-temp water 1-2 times daily while feeding tube is not in use.  Call [facility] when you have 10 days left of TF supplies: [phone], option 3 (customer support).      Follow Up Plan:  10/21/24 during infusion  Recommend Referral to Other Providers: none at this time

## 2024-10-14 NOTE — PATIENT INSTRUCTIONS
Nutrition Rx & Recommendations:  Diet: enteral nutrition as 1' source of nutrition  Stay hydrated by sipping fluids of choice/tolerance throughout the day.    Follow proper oral care; Try baking soda/salt water rinse recipe (mix 3/4 tsp salt + 1 tsp baking soda + 1 qt water; rinse with plain water after using) in Eating Hints book (pg 18).  Brush your teeth before/after meals & before bed.  Weigh yourself regularly. If you notice weight loss, make an effort to increase your daily food/calorie intake. If you continue to notice loss after these efforts, reach out to your dietitian to establish a plan to stabilize weight.   Continue 2 Ensure complete shakes as able.  TF plan - continue 4 cartons of Jevity 1.5 daily + consume 2 Ensure complete shakes daily.  If unable to consume orally, increase TF to goal rate.  TF Goal: 1 container (237 mL) 5 times per day + 1.5 containers (355 mL) once/day of Jevity 1.5 via PEG (push syringe or gravity syringe method to administer boluses; 1 container to infuse over ~15-20 minutes). (Total of 6.5 cartons/day)  Water Flushin mL free water flush pre/post each bolus (240 mL water) + additional 150 mL water flushes 4 times daily (600 mL water). If you stop drinking Ensure shakes orally, may need to adjust water flushes.  Allow for substitutions  Your syringes are each 60 mL or 2 fl oz.  Always flush your feeding tube with 60 mL room-temp water 1-2 times daily while feeding tube is not in use.  Call [facility] when you have 10 days left of TF supplies: [phone], option 3 (customer support).      Follow Up Plan: 10/21/24 during infusion  Recommend Referral to Other Providers: none at this time

## 2024-10-15 ENCOUNTER — APPOINTMENT (OUTPATIENT)
Dept: RADIATION ONCOLOGY | Facility: CLINIC | Age: 69
End: 2024-10-15
Attending: RADIOLOGY
Payer: MEDICARE

## 2024-10-15 ENCOUNTER — TELEPHONE (OUTPATIENT)
Dept: HEMATOLOGY ONCOLOGY | Facility: CLINIC | Age: 69
End: 2024-10-15

## 2024-10-15 ENCOUNTER — OFFICE VISIT (OUTPATIENT)
Dept: HEMATOLOGY ONCOLOGY | Facility: CLINIC | Age: 69
End: 2024-10-15
Payer: MEDICARE

## 2024-10-15 VITALS
SYSTOLIC BLOOD PRESSURE: 158 MMHG | OXYGEN SATURATION: 100 % | RESPIRATION RATE: 18 BRPM | HEIGHT: 66 IN | BODY MASS INDEX: 31.34 KG/M2 | HEART RATE: 57 BPM | WEIGHT: 195 LBS | TEMPERATURE: 97.5 F | DIASTOLIC BLOOD PRESSURE: 90 MMHG

## 2024-10-15 DIAGNOSIS — C78.7 METASTATIC COLON CANCER TO LIVER (HCC): ICD-10-CM

## 2024-10-15 DIAGNOSIS — C18.9 METASTATIC COLON CANCER TO LIVER (HCC): ICD-10-CM

## 2024-10-15 DIAGNOSIS — E86.0 DEHYDRATION: Primary | ICD-10-CM

## 2024-10-15 DIAGNOSIS — R94.8 ABNORMAL PET SCAN OF COLON: ICD-10-CM

## 2024-10-15 PROCEDURE — 88365 INSITU HYBRIDIZATION (FISH): CPT | Performed by: STUDENT IN AN ORGANIZED HEALTH CARE EDUCATION/TRAINING PROGRAM

## 2024-10-15 PROCEDURE — 88342 IMHCHEM/IMCYTCHM 1ST ANTB: CPT | Performed by: STUDENT IN AN ORGANIZED HEALTH CARE EDUCATION/TRAINING PROGRAM

## 2024-10-15 PROCEDURE — 88341 IMHCHEM/IMCYTCHM EA ADD ANTB: CPT | Performed by: STUDENT IN AN ORGANIZED HEALTH CARE EDUCATION/TRAINING PROGRAM

## 2024-10-15 PROCEDURE — 88307 TISSUE EXAM BY PATHOLOGIST: CPT | Performed by: STUDENT IN AN ORGANIZED HEALTH CARE EDUCATION/TRAINING PROGRAM

## 2024-10-15 PROCEDURE — 88305 TISSUE EXAM BY PATHOLOGIST: CPT | Performed by: STUDENT IN AN ORGANIZED HEALTH CARE EDUCATION/TRAINING PROGRAM

## 2024-10-15 PROCEDURE — 99215 OFFICE O/P EST HI 40 MIN: CPT | Performed by: INTERNAL MEDICINE

## 2024-10-15 PROCEDURE — 77014 CHG CT GUIDANCE RADIATION THERAPY FLDS PLACEMENT: CPT | Performed by: RADIOLOGY

## 2024-10-15 PROCEDURE — G2211 COMPLEX E/M VISIT ADD ON: HCPCS | Performed by: INTERNAL MEDICINE

## 2024-10-15 PROCEDURE — 88360 TUMOR IMMUNOHISTOCHEM/MANUAL: CPT | Performed by: STUDENT IN AN ORGANIZED HEALTH CARE EDUCATION/TRAINING PROGRAM

## 2024-10-15 PROCEDURE — 77386 HB NTSTY MODUL RAD TX DLVR CPLX: CPT | Performed by: RADIOLOGY

## 2024-10-15 PROCEDURE — 77336 RADIATION PHYSICS CONSULT: CPT | Performed by: RADIOLOGY

## 2024-10-15 PROCEDURE — 88312 SPECIAL STAINS GROUP 1: CPT | Performed by: STUDENT IN AN ORGANIZED HEALTH CARE EDUCATION/TRAINING PROGRAM

## 2024-10-15 RX ORDER — MAGNESIUM SULFATE HEPTAHYDRATE 40 MG/ML
2 INJECTION, SOLUTION INTRAVENOUS ONCE
Status: CANCELLED | OUTPATIENT
Start: 2024-10-16

## 2024-10-15 RX ORDER — MIRTAZAPINE 30 MG/1
30 TABLET, FILM COATED ORAL
Qty: 30 TABLET | Refills: 0 | Status: CANCELLED | OUTPATIENT
Start: 2024-10-15

## 2024-10-15 RX ORDER — POTASSIUM CHLORIDE 14.9 MG/ML
20 INJECTION INTRAVENOUS ONCE
Status: CANCELLED
Start: 2024-10-21

## 2024-10-15 RX ORDER — MIRTAZAPINE 30 MG/1
30 TABLET, FILM COATED ORAL
Qty: 30 TABLET | Refills: 3 | Status: SHIPPED | OUTPATIENT
Start: 2024-10-15

## 2024-10-15 RX ORDER — POTASSIUM CHLORIDE 14.9 MG/ML
20 INJECTION INTRAVENOUS ONCE
Status: CANCELLED
Start: 2024-10-16

## 2024-10-15 NOTE — TELEPHONE ENCOUNTER
Please schedule fluids every Wednesday/Friday at [facility]. Patient already scheduled for this week, spoke with PEPE Louis and confirmed appts. Aida set up for 10/16 and 10/18.

## 2024-10-15 NOTE — PROGRESS NOTES
Medical Oncology: Progress Note     Primary Care Provider: Fanta Turner MD       MRN: [id number] : 1955    Reason for Encounter: Follow-Up Visit.  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 On evaluation this morning, she states that she is tolerating concurrent chemoradiation fairly well. However, on exam, patient appears physically uncomfortable while speaking (e.g. Due to radiation-induced pharyngitis). She endorses uncontrolled pain. Patient was prescribed liquid Oxycodone per Otolaryngology, however, has not been administering the above-mentioned; given that she is hesitant due to the addictive qualities of narcotics. Patient was counseled at length regarding the importance of adequate pain-control. She was agreeable to see Palliative Care, as well; for multimodal pain-management. Additionally, patient recently had her G-tube placed on 2024. She subsequently presented to the Emergency Department on 2024, due to displacement of the fasteners, as well as leakage from the G-tube. The leakage has since resolved. Patient's daughter has been assisting with patient's G-tube; however, patient will require further help at home. Thus, will coordinate for VNA assistance. Other than the above-mentioned, patient states her peripheral neuropathy is relatively stable from previous. She is tolerating tube-feeds appropriately. Bowel-movements are loose without overt diarrhea. She denies nausea, or  "vomiting. Patient had no further concerns or complaints on completion of our encounter.    ECOG Performance Status: 1-Point.    Review of Systems:  All systems reviewed and negative except otherwise listed in the History of Present Illness.    Oncology History   Malignant neoplasm of right female breast (HCC)   11/23/2018 Initial Diagnosis    Malignant neoplasm of right female breast (HCC)     11/23/2018 Biopsy    Rt Breast US BX 1100 8cmfn, 4 passes 12g Marquee:  - Invasive breast carcinoma of no special type (ductal NST/invasive ductal carcinoma).  - grade 2  - %  - AR 95%  - HER2 negative     12/20/2018 Surgery    Right partial mastectomy and SLN biopsy:  Infiltrating ductal carcinoma, Grade 2/3, felt to be between 2.0 cm and 2.5 cm in greatest dimension  - No invasive tumor is seen at inked margins, but there is a focus of DCIS seen within approximately 1mm of the inked medial margin  - no LVI  - no LCIS  - 0/2 LN's  at least Stage IIA - pT2, pN0, cM0, G2.    - Dr Coronado     12/20/2018 -  Cancer Staged    Stage IIA - pT2, pN0, cM0, G2.       1/4/2019 Genomic Testing    Oncotype DX score: 13     2/1/2019 -  Hormone Therapy    anastrozole 1 mg daily as adjuvant endocrine therapy    - Dr Daley       2/14/2019 - 4/3/2019 Radiation    Course: C1    Plan ID Energy Fractions Dose per Fraction (cGy) Dose Correction (cGy) Total Dose Delivered (cGy) Elapsed Days   R Breast 10X/6X 25 / 25 200 0 5,000 40   R BRST BOOST 16E 5 / 5 200 0 1,000 7      Treatment dates:  C1: 2/14/2019 - 4/3/2019       Metastatic colon cancer to liver (HCC)   7/6/2023 Genetic Testing    CARIS Results:   KRAS Pathogenic Variant Exon 2  p.G12D : lack of benefit of cetuximab, panitumumab Level 2   No other actionable mutation; MSI stable; TMB low   MiFOLOXAi Results: \"Decreased Benefit of FOLFOX + Bevacizumab in first line metastatic CRC.  This patient may achieve improved results by receiving an alternative to FOLFOX, such as FOLFIRI, as " "their initial regimen. As an adjustment to frontline FOLFOXIRI following toxicity: This patient may achieve improved results by removing the oxaliplatin portion of their regimen\".         7/11/2023 Initial Diagnosis    Metastatic colon cancer to liver (HCC)     7/13/2023 -  Cancer Staged    Staging form: Colon and Rectum, AJCC 8th Edition  - Clinical stage from 7/13/2023: cT3, cN0, pM1 - Signed by Harika Medina DO on 9/28/2023  Total positive nodes: 0       7/31/2023 - 8/1/2024 Chemotherapy    cyanocobalamin, 1,000 mcg, Intramuscular, Every 30 days, 7 of 9 cycles  Administration: 1,000 mcg (5/9/2024), 1,000 mcg (5/24/2024), 1,000 mcg (6/20/2024), 1,000 mcg (7/3/2024), 1,000 mcg (7/18/2024), 1,000 mcg (8/1/2024)  potassium chloride, 20 mEq, Intravenous, Once, 2 of 2 cycles  Administration: 20 mEq (11/6/2023), 20 mEq (11/20/2023)  alteplase (CATHFLO), 2 mg, Intracatheter, Every 1 Minute as needed, 21 of 23 cycles  Administration: 2 mg (1/3/2024)  pegfilgrastim (NEULASTA), 6 mg, Subcutaneous, Once, 12 of 12 cycles  Administration: 6 mg (10/25/2023), 6 mg (11/8/2023), 6 mg (11/22/2023), 6 mg (12/6/2023), 6 mg (12/20/2023), 6 mg (1/12/2024), 6 mg (1/25/2024), 6 mg (4/25/2024), 6 mg (5/9/2024), 6 mg (5/24/2024), 6 mg (6/20/2024)  fluorouracil (ADRUCIL), 895 mg, Intravenous, Once, 21 of 23 cycles  Dose modification: 320 mg/m2 (original dose 400 mg/m2, Cycle 11)  Administration: 900 mg (7/31/2023), 900 mg (8/14/2023), 900 mg (8/28/2023), 900 mg (9/11/2023), 900 mg (9/25/2023), 900 mg (10/9/2023), 900 mg (10/23/2023), 895 mg (11/6/2023), 895 mg (11/20/2023), 895 mg (12/4/2023), 700 mg (12/18/2023), 680 mg (1/10/2024), 680 mg (1/23/2024), 625 mg (4/23/2024), 625 mg (5/7/2024), 625 mg (5/22/2024), 625 mg (6/4/2024), 625 mg (6/18/2024), 625 mg (7/1/2024), 625 mg (7/16/2024), 625 mg (7/30/2024)  nivolumab (OPDIVO) IVPB, 240 mg (100 % of original dose 240 mg), Intravenous, Once, 13 of 15 cycles  Dose modification: 240 mg " (original dose 240 mg, Cycle 9)  Administration: 240 mg (11/20/2023), 240 mg (12/4/2023), 240 mg (12/18/2023), 240 mg (1/10/2024), 240 mg (1/23/2024), 240 mg (4/23/2024), 240 mg (5/7/2024), 240 mg (5/22/2024), 240 mg (6/4/2024), 240 mg (6/18/2024), 240 mg (7/1/2024), 240 mg (7/16/2024), 240 mg (7/30/2024)  leucovorin calcium IVPB, 896 mg, Intravenous, Once, 21 of 23 cycles  Administration: 900 mg (7/31/2023), 900 mg (8/14/2023), 900 mg (8/28/2023), 900 mg (9/11/2023), 900 mg (9/25/2023), 900 mg (10/9/2023), 900 mg (10/23/2023), 900 mg (11/6/2023), 900 mg (11/20/2023), 900 mg (12/4/2023), 900 mg (12/18/2023), 850 mg (1/10/2024), 850 mg (1/23/2024), 800 mg (4/23/2024), 800 mg (5/7/2024), 800 mg (5/22/2024), 800 mg (6/4/2024), 800 mg (6/18/2024), 800 mg (7/1/2024), 800 mg (7/16/2024), 800 mg (7/30/2024)  oxaliplatin (ELOXATIN) chemo infusion, 85 mg/m2 = 190.4 mg, Intravenous, Once, 12 of 12 cycles  Dose modification: 68 mg/m2 (original dose 85 mg/m2, Cycle 11, Reason: Other (Must fill in a comment), Comment: increased fatigue)  Administration: 190.4 mg (7/31/2023), 190.4 mg (8/14/2023), 200 mg (8/28/2023), 200 mg (9/11/2023), 200 mg (9/25/2023), 200 mg (10/9/2023), 200 mg (10/23/2023), 200 mg (11/6/2023), 200 mg (11/20/2023), 190.4 mg (12/4/2023), 150 mg (12/18/2023), 150 mg (1/10/2024)  fluorouracil (ADRUCIL) ambulatory infusion Soln, 1,200 mg/m2/day = 5,375 mg, Intravenous, Over 46 hours, 21 of 23 cycles     9/26/2023 -  Cancer Staged    Staging form: Colon and Rectum, AJCC 8th Edition  - Pathologic: pT3, pN0, cM1 - Signed by Vickie Israel MD on 4/3/2024  Total positive nodes: 0       3/12/2024 Surgery    A. Terminal ileum, appendix, right colon (right hemicolectomy):  - Adenocarcinoma (5.2cm)  - Forty-nine (49) lymph nodes negative for carcinoma (0/49)    - Acellular mucin present in one (1) lymph node   - See staging synoptic (ypT3N0)     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HCC)   7/27/2023 Initial  Diagnosis    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HCC)     7/27/2023 -  Cancer Staged    Staging form: Pharynx - Oropharynx, AJCC 8th Edition  - Clinical stage from 7/27/2023: Stage III (cT1, cN3, cM0, p16+) - Signed by Evan Plummer MD on 7/27/2023  Stage prefix: Initial diagnosis       9/17/2024 -  Chemotherapy    potassium chloride, 20 mEq, Intravenous, Once, 0 of 1 cycle  alteplase (CATHFLO), 2 mg, Intracatheter, Every 1 Minute as needed, 5 of 6 cycles  CISplatin (PLATINOL) infusion, 70 mg (original dose 40 mg/m2), Intravenous, Once, 5 of 6 cycles  Dose modification: 70 mg (original dose 40 mg/m2, Cycle 1, Reason: Anticipated Tolerance), 30 mg/m2 (original dose 40 mg/m2, Cycle 4, Reason: Neuropathy, Comment: dose reduction to 30 mg/m2 due to neuropathy)  Administration: 70 mg (9/17/2024), 70 mg (9/23/2024), 70 mg (9/30/2024), 59.1 mg (10/7/2024), 59.1 mg (10/14/2024)  sodium chloride, 500 mL, Intravenous, Once, 5 of 6 cycles  Administration: 500 mL (9/17/2024), 500 mL (9/17/2024), 500 mL (9/23/2024), 500 mL (9/23/2024), 500 mL (9/30/2024), 500 mL (9/30/2024), 500 mL (10/7/2024), 500 mL (10/7/2024), 500 mL (10/14/2024), 500 mL (10/14/2024)  fosaprepitant (EMEND) IVPB, 150 mg, Intravenous, Once, 5 of 6 cycles  Administration: 150 mg (9/17/2024), 150 mg (9/23/2024), 150 mg (9/30/2024), 150 mg (10/7/2024), 150 mg (10/14/2024)       Problem List:  Patient Active Problem List   Diagnosis    Primary hypertension    Mixed hyperlipidemia    Malignant neoplasm of right female breast (HCC)    Vitamin D deficiency    Prediabetes    Right thyroid nodule    GERD (gastroesophageal reflux disease)    Obesity    Metastatic colon cancer to liver (HCC)    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HCC)    Poor appetite    Nutritional anemia    Dehydration    Drug-induced constipation    Chemotherapy induced neutropenia (HCC)    Stage 3a chronic kidney disease (HCC)    Thrombocytopenia (HCC)    Neuropathy    Diastolic  dysfunction    Hypokalemia    Leukemoid reaction    GOGO (acute kidney injury) (HCC)    Acute post-operative pain    At risk for constipation    At risk for delirium    Advance care planning    Physical deconditioning    History of CVA (cerebrovascular accident)    Chronic nasal congestion    Elevated LFTs    Vitamin B12 deficiency     Assessment and Plan:  Patient is a 69-year-old female, with a complex Oncologic history, including synchronous de naveed metastatic adenocarcinoma of the mid-ascending colon (Complicated by partial-obstruction requiring right hemicolectomy on March 15th, 2024) with biopsy-proven oligometastatic disease to the liver (Status-post cryoablation on June 3rd, 2024), and unresectable (At least locally-advanced versus metastatic) 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 Of note, following PET-CT on August 5th, 2024 showing interval disease progression in the neck, patient's case was presented at the Multidisciplinary Head and Neck Tumor Board on August 12th, 2024, which culminated in consensus for re-evaluation by Otolaryngology and Radiation Oncology. Patient was recommended definitive chemoradiation. She has since initiated concurrent chemoradiation with weekly Cisplatin, and has completed four cycles, thus far. Unfortunately, patient reports interval development of severe pharyngitis. She was prescribed liquid Oxycodone per Otolaryngology, however, has not been administering the above-mentioned. Counseled patient regarding the importance of use of Oxycodone for pain-control. Will also refer back to Palliative Care, for further assistance with symptom-management. In the interim, will provide intravenous-fluids and electrolyte-replacement (Monday, Wednesday, and Friday) in the outpatient infusion center. Will also arrange for a Visiting Nursing Assistant, to aid patient with tube-feeds, and G-tube management. Discussed the  above-mentioned, at length. She expressed understanding and agreement.    Summary of Recommendations:  Continue Oxycodone 5 mg Per Tube Q6HR PRN, for pain-control.  Coordinate for close interval follow-up with Palliative Care, for further symptom-management.  Recommend initiation of intravenous-fluids and electrolyte-replacement (M, W, F).  Given progressive peripheral-neuropathy, and poor tolerance of chemoradiation:  Plan to discontinue weekly Cisplatin 30 mg/m2 after Cycle 6 (Cancelled Cycle 7).  Will arrange for Visiting Nursing Assistant, to aid with tube-feeds, and G-tube management.  Recommend close interval follow-up in approximately 2-weeks, for re-evaluation.  Note: Patient was provided Ensure meal-supplements at the end of her encounter.    Past Medical History:   has a past medical history of Anemia, Arthritis, Body mass index (BMI) 40.0-44.9, adult (HCC), Breast cancer (HCC) (12/17/2018), Cancer (HCC), Cervical lymphadenopathy, Encounter for screening for HIV (07/07/2022), Follow-up examination (04/04/2023), GERD (gastroesophageal reflux disease), Hyperlipidemia, Hypertension, Obesity, Stroke (HCC), Stroke (HCC), Transaminitis (09/22/2021), and Vasomotor rhinitis.    Past Surgical History:   has a past surgical history that includes US guided breast biopsy right complete (Right, 11/23/2018); Breast surgery; pr mastectomy partial w/axillary lymphadenectomy (Right, 12/20/2018); Tubal ligation; Breast biopsy (Right, 11/23/2018); US guided injection for research study (12/20/2018); US guided injection for research study (12/07/2018); US guided injection for research study (05/03/2019); US guided lymph node biopsy right (05/26/2023); IR biopsy liver mass (06/27/2023); pr laryngoscopy w/biopsy microscope/telescope (N/A, 07/20/2023); pr brnchsc incl fluor gdnce dx w/cell washg spx (N/A, 07/20/2023); ESOPHAGOSCOPY (N/A, 07/20/2023); IR port placement (07/28/2023); IR port check (01/03/2024); IR port stripping  (2024); Colonoscopy; Hemicoloectomy w/ anastomosis (Right, 3/12/2024); LAPAROTOMY (N/A, 3/12/2024); IR cryoablation (6/3/2024); IR gastrostomy tube placement (10/2/2024); pr laryngoscopy w/wo tracheoscopy dx except  (N/A, 10/9/2024); pr biopsy nasopharynx visible lesion simple (N/A, 10/9/2024); and pr tonsillectomy primary/secondary age 12/> (N/A, 10/9/2024).    Current Medications:  Current Outpatient Medications   Medication Sig Dispense Refill    acetaminophen (TYLENOL) 160 mg/5 mL liquid Take 20 mL (640 mg total) by mouth every 6 (six) hours as needed for mild pain for up to 10 days 473 mL 0    anastrozole (ARIMIDEX) 1 mg tablet Take 1 tablet (1 mg total) by mouth daily 90 tablet 3    atorvastatin (LIPITOR) 40 mg tablet Take 1 tablet (40 mg total) by mouth daily at bedtime 30 tablet 2    diphenhydramine, lidocaine, Al/Mg hydroxide, simethicone (Magic Mouthwash) SUSP Swish and swallow 10 mL every 4 (four) hours as needed for mouth pain or discomfort 480 mL 1    docusate sodium (COLACE) 50 mg capsule Take 1 capsule (50 mg total) by mouth 2 (two) times a day 90 capsule 1    ergocalciferol (VITAMIN D2) 50,000 units Take 1 capsule (50,000 Units total) by mouth once a week 90 capsule 3    ferrous sulfate 325 (65 Fe) mg tablet Take 1 tablet (325 mg total) by mouth daily with breakfast 30 tablet 0    folic acid (FOLVITE) 1 mg tablet Take 1 tablet (1 mg total) by mouth in the morning 90 tablet 3    gabapentin (NEURONTIN) 300 mg capsule Take 1 pills in the morning, 1 pill at lunch and 2 pills before bed 120 capsule 3    hydrocortisone 1 % ointment       ibuprofen (MOTRIN) 100 mg/5 mL suspension Take 20 mL (400 mg total) by mouth every 6 (six) hours as needed for moderate pain 473 mL 0    lidocaine-prilocaine (EMLA) cream Apply topically as needed for mild pain 30 g 3    losartan (COZAAR) 50 mg tablet Take 1 tablet (50 mg total) by mouth daily 90 tablet 5    mirtazapine (REMERON) 15 mg tablet Take 1 tablet (15  mg total) by mouth daily at bedtime Patient is also on a 30 mg tablet, for a total dose of 45 mg 30 tablet 3    mirtazapine (REMERON) 30 mg tablet Take 1 tablet (30 mg total) by mouth daily at bedtime 30 tablet 3    omeprazole (PriLOSEC) 20 mg delayed release capsule Take 1 capsule (20 mg total) by mouth daily 30 capsule 5    ondansetron (ZOFRAN) 8 mg tablet Take 1 tablet (8 mg total) by mouth every 8 (eight) hours as needed for nausea or vomiting 90 tablet 2    oxyCODONE (ROXICODONE) 5 mg/5 mL solution Take 5 mL (5 mg total) by mouth every 6 (six) hours as needed for severe pain ((breakthrough pain)) Max Daily Amount: 20 mg 100 mL 0    potassium chloride (Klor-Con M20) 20 mEq tablet Take 1 tablet (20 mEq total) by mouth 2 (two) times a day 90 tablet 1    prochlorperazine (COMPAZINE) 10 mg tablet Take 1 tablet (10 mg total) by mouth every 6 (six) hours as needed for nausea or vomiting 90 tablet 2    pyridoxine (VITAMIN B6) 50 mg tablet Take 1 tablet (50 mg total) by mouth daily 90 tablet 3    vitamin B-12 (VITAMIN B-12) 1,000 mcg tablet        No current facility-administered medications for this visit.     Facility-Administered Medications Ordered in Other Visits   Medication Dose Route Frequency Provider Last Rate Last Admin    alteplase (CATHFLO) injection 2 mg  2 mg Intracatheter Q1MIN PRN Harika Medina DO         Social History:   reports that she has never smoked. She has never been exposed to tobacco smoke. She has never used smokeless tobacco. She reports that she does not drink alcohol and does not use drugs.     Family History:  family history includes Colon cancer (age of onset: 58) in her mother; Hypertension in her daughter, mother, and son; Pancreatic cancer (age of onset: 60) in her father.     Allergies:  has No Known Allergies.    Objective:  Vitals:    10/15/24 0802   BP: 158/90   Pulse: 57   Resp: 18   Temp: 97.5 °F (36.4 °C)   SpO2: 100%     Physical Exam:  General: Alert, and oriented;  Pleasant; Appears Uncomfortable with Speech Given Radiation-Induced Pharyngitis  HEENT: Atraumatic, and normocephalic; PERRLA; EOMI; Moist mucosal membranes; Marked Pharyngeal Erythema Without Obvious Exudates  Cardiovascular: Regular rate and rhythm; No murmurs, rubs, or gallops; No edema  Respiratory: Clear to auscultation bilaterally; Adequate aeration; No supplemental oxygen   Abdomen: Soft, Non-tender; Non-distended; No organomegaly; Bowel sounds present  Extremities: No obvious deformities; No peripheral edema; Moves all  Neurologic: Appropriately alert, and oriented to Person, Place, and Time; No focal neurologic deficits    Labs:  Lab Results   Component Value Date    WBC 4.70 10/11/2024    HGB 8.2 (L) 10/11/2024    HCT 24.8 (L) 10/11/2024    MCV 86 10/11/2024     (L) 10/11/2024     Lab Results   Component Value Date    SODIUM 141 10/11/2024    K 3.9 10/11/2024     10/11/2024    CO2 27 10/11/2024    AGAP 6 10/11/2024    BUN 29 (H) 10/11/2024    CREATININE 1.08 10/11/2024    GLUC 99 10/11/2024    GLUF 95 09/13/2024    CALCIUM 8.5 10/11/2024    AST 18 10/11/2024    ALT 13 10/11/2024    ALKPHOS 90 10/11/2024    TP 7.1 10/11/2024    TBILI 0.32 10/11/2024    EGFR 52 10/11/2024     Patient was seen and discussed with attending physician, ALAINA Steve D.O.  Hematology-Oncology Fellow (PGY-5)

## 2024-10-16 ENCOUNTER — APPOINTMENT (OUTPATIENT)
Dept: RADIATION ONCOLOGY | Facility: CLINIC | Age: 69
End: 2024-10-16
Attending: RADIOLOGY
Payer: MEDICARE

## 2024-10-16 ENCOUNTER — HOSPITAL ENCOUNTER (OUTPATIENT)
Dept: INFUSION CENTER | Facility: CLINIC | Age: 69
Discharge: HOME | End: 2024-10-16
Payer: MEDICARE

## 2024-10-16 DIAGNOSIS — E86.0 DEHYDRATION: Primary | ICD-10-CM

## 2024-10-16 LAB
ALBUMIN SERPL BCG-MCNC: 3.3 G/DL (ref 3.5–5)
ALP SERPL-CCNC: 81 U/L (ref 34–104)
ALT SERPL W P-5'-P-CCNC: 14 U/L (ref 7–52)
ANION GAP SERPL CALCULATED.3IONS-SCNC: 6 MMOL/L (ref 4–13)
AST SERPL W P-5'-P-CCNC: 18 U/L (ref 13–39)
BASOPHILS # BLD AUTO: 0.01 THOUSANDS/ΜL (ref 0–0.1)
BASOPHILS NFR BLD AUTO: 0 % (ref 0–1)
BILIRUB SERPL-MCNC: 0.28 MG/DL (ref 0.2–1)
BUN SERPL-MCNC: 41 MG/DL (ref 5–25)
CALCIUM ALBUM COR SERPL-MCNC: 9.3 MG/DL (ref 8.3–10.1)
CALCIUM SERPL-MCNC: 8.7 MG/DL (ref 8.4–10.2)
CHLORIDE SERPL-SCNC: 110 MMOL/L (ref 96–108)
CO2 SERPL-SCNC: 28 MMOL/L (ref 21–32)
CREAT SERPL-MCNC: 1.15 MG/DL (ref 0.6–1.3)
EOSINOPHIL # BLD AUTO: 0.03 THOUSAND/ΜL (ref 0–0.61)
EOSINOPHIL NFR BLD AUTO: 1 % (ref 0–6)
ERYTHROCYTE [DISTWIDTH] IN BLOOD BY AUTOMATED COUNT: 17.2 % (ref 11.6–15.1)
GFR SERPL CREATININE-BSD FRML MDRD: 49 ML/MIN/1.73SQ M
GLUCOSE SERPL-MCNC: 93 MG/DL (ref 65–140)
HCT VFR BLD AUTO: 22.8 % (ref 34.8–46.1)
HGB BLD-MCNC: 7.4 G/DL (ref 11.5–15.4)
IMM GRANULOCYTES # BLD AUTO: 0.01 THOUSAND/UL (ref 0–0.2)
IMM GRANULOCYTES NFR BLD AUTO: 0 % (ref 0–2)
LYMPHOCYTES # BLD AUTO: 0.39 THOUSANDS/ΜL (ref 0.6–4.47)
LYMPHOCYTES NFR BLD AUTO: 12 % (ref 14–44)
MAGNESIUM SERPL-MCNC: 1.7 MG/DL (ref 1.9–2.7)
MCH RBC QN AUTO: 27.9 PG (ref 26.8–34.3)
MCHC RBC AUTO-ENTMCNC: 32.5 G/DL (ref 31.4–37.4)
MCV RBC AUTO: 86 FL (ref 82–98)
MONOCYTES # BLD AUTO: 0.39 THOUSAND/ΜL (ref 0.17–1.22)
MONOCYTES NFR BLD AUTO: 12 % (ref 4–12)
NEUTROPHILS # BLD AUTO: 2.41 THOUSANDS/ΜL (ref 1.85–7.62)
NEUTS SEG NFR BLD AUTO: 75 % (ref 43–75)
NRBC BLD AUTO-RTO: 0 /100 WBCS
PLATELET # BLD AUTO: 153 THOUSANDS/UL (ref 149–390)
PMV BLD AUTO: 10.3 FL (ref 8.9–12.7)
POTASSIUM SERPL-SCNC: 3.8 MMOL/L (ref 3.5–5.3)
PROT SERPL-MCNC: 7 G/DL (ref 6.4–8.4)
RBC # BLD AUTO: 2.65 MILLION/UL (ref 3.81–5.12)
SODIUM SERPL-SCNC: 144 MMOL/L (ref 135–147)
WBC # BLD AUTO: 3.24 THOUSAND/UL (ref 4.31–10.16)

## 2024-10-16 PROCEDURE — 83735 ASSAY OF MAGNESIUM: CPT | Performed by: INTERNAL MEDICINE

## 2024-10-16 PROCEDURE — 77386 HB NTSTY MODUL RAD TX DLVR CPLX: CPT | Performed by: INTERNAL MEDICINE

## 2024-10-16 PROCEDURE — 96365 THER/PROPH/DIAG IV INF INIT: CPT

## 2024-10-16 PROCEDURE — 77014 CHG CT GUIDANCE RADIATION THERAPY FLDS PLACEMENT: CPT | Performed by: INTERNAL MEDICINE

## 2024-10-16 PROCEDURE — 85025 COMPLETE CBC W/AUTO DIFF WBC: CPT | Performed by: INTERNAL MEDICINE

## 2024-10-16 PROCEDURE — 80053 COMPREHEN METABOLIC PANEL: CPT | Performed by: INTERNAL MEDICINE

## 2024-10-16 RX ORDER — MAGNESIUM SULFATE HEPTAHYDRATE 40 MG/ML
2 INJECTION, SOLUTION INTRAVENOUS ONCE
Status: COMPLETED | OUTPATIENT
Start: 2024-10-16 | End: 2024-10-16

## 2024-10-16 RX ORDER — MAGNESIUM SULFATE HEPTAHYDRATE 40 MG/ML
2 INJECTION, SOLUTION INTRAVENOUS ONCE
Status: CANCELLED | OUTPATIENT
Start: 2024-10-18

## 2024-10-16 RX ORDER — POTASSIUM CHLORIDE 14.9 MG/ML
20 INJECTION INTRAVENOUS ONCE
Status: CANCELLED
Start: 2024-10-18 | End: 2024-10-18

## 2024-10-16 RX ADMIN — MAGNESIUM SULFATE HEPTAHYDRATE 2 G: 2 INJECTION, SOLUTION INTRAVENOUS at 09:22

## 2024-10-16 RX ADMIN — SODIUM CHLORIDE 1000 ML: 0.9 INJECTION, SOLUTION INTRAVENOUS at 09:22

## 2024-10-16 NOTE — PROGRESS NOTES
Patient arrived to the unit for her hydration. Central labs were drawn. Patient was prescribed IV potassium today. Her K+ is 3.9. Confirmed with Hollie Reaves that it is ok to skip potassium today. Patient is having loose stools 2x a day and is currently drinking ensure and doing tube feeds. Patient tolerated her hydration and her magnesium. She is aware to return for central labs and hydration on Friday.

## 2024-10-16 NOTE — PLAN OF CARE
Problem: INFECTION - ADULT  Goal: Absence or prevention of progression during hospitalization  Description: INTERVENTIONS:  - Assess and monitor for signs and symptoms of infection  - Monitor lab/diagnostic results  - Monitor all insertion sites, i.e. indwelling lines, tubes, and drains  - Monitor endotracheal if appropriate and nasal secretions for changes in amount and color  - [place] appropriate cooling/warming therapies per order  - Administer medications as ordered  - Instruct and encourage patient and family to use good hand hygiene technique  - Identify and instruct in appropriate isolation precautions for identified infection/condition  Outcome: Progressing

## 2024-10-17 ENCOUNTER — APPOINTMENT (OUTPATIENT)
Dept: RADIATION ONCOLOGY | Facility: CLINIC | Age: 69
End: 2024-10-17
Attending: RADIOLOGY
Payer: MEDICARE

## 2024-10-17 PROCEDURE — 77014 CHG CT GUIDANCE RADIATION THERAPY FLDS PLACEMENT: CPT | Performed by: RADIOLOGY

## 2024-10-17 PROCEDURE — 77427 RADIATION TX MANAGEMENT X5: CPT | Performed by: RADIOLOGY

## 2024-10-17 PROCEDURE — 77386 HB NTSTY MODUL RAD TX DLVR CPLX: CPT | Performed by: RADIOLOGY

## 2024-10-18 ENCOUNTER — APPOINTMENT (OUTPATIENT)
Dept: RADIATION ONCOLOGY | Facility: CLINIC | Age: 69
End: 2024-10-18
Attending: RADIOLOGY
Payer: MEDICARE

## 2024-10-18 ENCOUNTER — TELEPHONE (OUTPATIENT)
Age: 69
End: 2024-10-18

## 2024-10-18 ENCOUNTER — HOSPITAL ENCOUNTER (OUTPATIENT)
Dept: INFUSION CENTER | Facility: CLINIC | Age: 69
End: 2024-10-18
Payer: MEDICARE

## 2024-10-18 ENCOUNTER — PATIENT OUTREACH (OUTPATIENT)
Dept: HEMATOLOGY ONCOLOGY | Facility: CLINIC | Age: 69
End: 2024-10-18

## 2024-10-18 VITALS
SYSTOLIC BLOOD PRESSURE: 133 MMHG | TEMPERATURE: 97 F | HEART RATE: 76 BPM | RESPIRATION RATE: 18 BRPM | DIASTOLIC BLOOD PRESSURE: 82 MMHG

## 2024-10-18 DIAGNOSIS — Z17.0 MALIGNANT NEOPLASM OF UPPER-OUTER QUADRANT OF RIGHT BREAST IN FEMALE, ESTROGEN RECEPTOR POSITIVE (HCC): Primary | ICD-10-CM

## 2024-10-18 DIAGNOSIS — C50.411 MALIGNANT NEOPLASM OF UPPER-OUTER QUADRANT OF RIGHT BREAST IN FEMALE, ESTROGEN RECEPTOR POSITIVE (HCC): Primary | ICD-10-CM

## 2024-10-18 LAB
ALBUMIN SERPL BCG-MCNC: 3.3 G/DL (ref 3.5–5)
ALP SERPL-CCNC: 86 U/L (ref 34–104)
ALT SERPL W P-5'-P-CCNC: 13 U/L (ref 7–52)
ANION GAP SERPL CALCULATED.3IONS-SCNC: 7 MMOL/L (ref 4–13)
AST SERPL W P-5'-P-CCNC: 18 U/L (ref 13–39)
BASOPHILS # BLD AUTO: 0.01 THOUSANDS/ΜL (ref 0–0.1)
BASOPHILS NFR BLD AUTO: 0 % (ref 0–1)
BILIRUB SERPL-MCNC: 0.18 MG/DL (ref 0.2–1)
BUN SERPL-MCNC: 36 MG/DL (ref 5–25)
CALCIUM ALBUM COR SERPL-MCNC: 9.2 MG/DL (ref 8.3–10.1)
CALCIUM SERPL-MCNC: 8.6 MG/DL (ref 8.4–10.2)
CHLORIDE SERPL-SCNC: 106 MMOL/L (ref 96–108)
CO2 SERPL-SCNC: 28 MMOL/L (ref 21–32)
CREAT SERPL-MCNC: 1 MG/DL (ref 0.6–1.3)
EOSINOPHIL # BLD AUTO: 0.02 THOUSAND/ΜL (ref 0–0.61)
EOSINOPHIL NFR BLD AUTO: 1 % (ref 0–6)
ERYTHROCYTE [DISTWIDTH] IN BLOOD BY AUTOMATED COUNT: 17.1 % (ref 11.6–15.1)
GFR SERPL CREATININE-BSD FRML MDRD: 58 ML/MIN/1.73SQ M
GLUCOSE SERPL-MCNC: 113 MG/DL (ref 65–140)
HCT VFR BLD AUTO: 22.8 % (ref 34.8–46.1)
HGB BLD-MCNC: 7.5 G/DL (ref 11.5–15.4)
IMM GRANULOCYTES # BLD AUTO: 0.01 THOUSAND/UL (ref 0–0.2)
IMM GRANULOCYTES NFR BLD AUTO: 0 % (ref 0–2)
LYMPHOCYTES # BLD AUTO: 0.28 THOUSANDS/ΜL (ref 0.6–4.47)
LYMPHOCYTES NFR BLD AUTO: 8 % (ref 14–44)
MAGNESIUM SERPL-MCNC: 1.3 MG/DL (ref 1.9–2.7)
MCH RBC QN AUTO: 28.7 PG (ref 26.8–34.3)
MCHC RBC AUTO-ENTMCNC: 32.9 G/DL (ref 31.4–37.4)
MCV RBC AUTO: 87 FL (ref 82–98)
MONOCYTES # BLD AUTO: 0.35 THOUSAND/ΜL (ref 0.17–1.22)
MONOCYTES NFR BLD AUTO: 10 % (ref 4–12)
NEUTROPHILS # BLD AUTO: 2.69 THOUSANDS/ΜL (ref 1.85–7.62)
NEUTS SEG NFR BLD AUTO: 81 % (ref 43–75)
NRBC BLD AUTO-RTO: 0 /100 WBCS
PLATELET # BLD AUTO: 151 THOUSANDS/UL (ref 149–390)
PMV BLD AUTO: 10.6 FL (ref 8.9–12.7)
POTASSIUM SERPL-SCNC: 3.4 MMOL/L (ref 3.5–5.3)
PROT SERPL-MCNC: 6.9 G/DL (ref 6.4–8.4)
RBC # BLD AUTO: 2.61 MILLION/UL (ref 3.81–5.12)
SODIUM SERPL-SCNC: 141 MMOL/L (ref 135–147)
WBC # BLD AUTO: 3.36 THOUSAND/UL (ref 4.31–10.16)

## 2024-10-18 PROCEDURE — 83735 ASSAY OF MAGNESIUM: CPT | Performed by: INTERNAL MEDICINE

## 2024-10-18 PROCEDURE — 96365 THER/PROPH/DIAG IV INF INIT: CPT

## 2024-10-18 PROCEDURE — 96366 THER/PROPH/DIAG IV INF ADDON: CPT

## 2024-10-18 PROCEDURE — 80053 COMPREHEN METABOLIC PANEL: CPT | Performed by: INTERNAL MEDICINE

## 2024-10-18 PROCEDURE — 85025 COMPLETE CBC W/AUTO DIFF WBC: CPT | Performed by: INTERNAL MEDICINE

## 2024-10-18 PROCEDURE — 77014 CHG CT GUIDANCE RADIATION THERAPY FLDS PLACEMENT: CPT | Performed by: RADIOLOGY

## 2024-10-18 PROCEDURE — 77386 HB NTSTY MODUL RAD TX DLVR CPLX: CPT | Performed by: RADIOLOGY

## 2024-10-18 RX ADMIN — POTASSIUM CHLORIDE: 2 INJECTION, SOLUTION, CONCENTRATE INTRAVENOUS at 12:12

## 2024-10-18 NOTE — PROGRESS NOTES
Pt called to confirm she is scheduled for transportation for today. I confirmed via round trip she is scheduled for at 10:15am  today 10/18/2024

## 2024-10-18 NOTE — PLAN OF CARE
Problem: Potential for Falls  Goal: Patient will remain free of falls  Description: INTERVENTIONS:  - Educate patient/family on patient safety including physical limitations  - Instruct patient to call for assistance with activity   - Consult OT/PT to assist with strengthening/mobility   - Keep Call bell within reach  - Keep bed low and locked with side rails adjusted as appropriate  - Keep care items and personal belongings within reach  - Initiate and maintain comfort rounds  - Make Fall Risk Sign visible to staff  - O- Consider moving patient to room near nurses station  Outcome: Progressing

## 2024-10-18 NOTE — TELEPHONE ENCOUNTER
Regarding: sooner appt  ----- Message from Kinjal GUZMAN sent at 10/18/2024 12:03 PM EDT -----  Patient called in to r/s appt. Patient has feeding tube. Patient is looking for an appt only on Tuesdays or Thursday. Patient was offer next available appt in December. Patient asked for Megan to return call.

## 2024-10-18 NOTE — PROGRESS NOTES
Pt arrived to unit feeling weak, tired. Pt states she has been having diarrhea. Pt given IV hydration with Magnesium and Potassium. Pt states she felt better after infusion. Pt left unit in stable condition, [facility] called, pt aware of next appt 10/21 [id number]

## 2024-10-21 ENCOUNTER — NUTRITION (OUTPATIENT)
Dept: NUTRITION | Facility: CLINIC | Age: 69
End: 2024-10-21

## 2024-10-21 ENCOUNTER — HOSPITAL ENCOUNTER (OUTPATIENT)
Dept: INFUSION CENTER | Facility: CLINIC | Age: 69
Discharge: HOME | End: 2024-10-21
Payer: MEDICARE

## 2024-10-21 ENCOUNTER — APPOINTMENT (OUTPATIENT)
Dept: RADIATION ONCOLOGY | Facility: CLINIC | Age: 69
End: 2024-10-21
Attending: RADIOLOGY
Payer: MEDICARE

## 2024-10-21 VITALS
TEMPERATURE: 98.7 F | HEIGHT: 66 IN | BODY MASS INDEX: 30.68 KG/M2 | SYSTOLIC BLOOD PRESSURE: 133 MMHG | RESPIRATION RATE: 18 BRPM | DIASTOLIC BLOOD PRESSURE: 91 MMHG | HEART RATE: 108 BPM | WEIGHT: 190.92 LBS

## 2024-10-21 DIAGNOSIS — Z90.89 STATUS POST TONSILLECTOMY: ICD-10-CM

## 2024-10-21 DIAGNOSIS — C09.9 RIGHT TONSILLAR SQUAMOUS CELL CARCINOMA (HCC): Primary | ICD-10-CM

## 2024-10-21 DIAGNOSIS — Z71.3 NUTRITIONAL COUNSELING: Primary | ICD-10-CM

## 2024-10-21 PROCEDURE — 77386 HB NTSTY MODUL RAD TX DLVR CPLX: CPT | Performed by: RADIOLOGY

## 2024-10-21 PROCEDURE — 77014 CHG CT GUIDANCE RADIATION THERAPY FLDS PLACEMENT: CPT | Performed by: RADIOLOGY

## 2024-10-21 PROCEDURE — 96366 THER/PROPH/DIAG IV INF ADDON: CPT

## 2024-10-21 PROCEDURE — 96413 CHEMO IV INFUSION 1 HR: CPT

## 2024-10-21 PROCEDURE — 36593 DECLOT VASCULAR DEVICE: CPT

## 2024-10-21 PROCEDURE — 96367 TX/PROPH/DG ADDL SEQ IV INF: CPT

## 2024-10-21 RX ORDER — OXYCODONE HCL 5 MG/5 ML
5 SOLUTION, ORAL ORAL EVERY 6 HOURS PRN
Qty: 473 ML | Refills: 0 | Status: SHIPPED | OUTPATIENT
Start: 2024-10-21

## 2024-10-21 RX ORDER — SODIUM CHLORIDE 9 MG/ML
20 INJECTION, SOLUTION INTRAVENOUS ONCE
Status: COMPLETED | OUTPATIENT
Start: 2024-10-21 | End: 2024-10-21

## 2024-10-21 RX ADMIN — FOSAPREPITANT 150 MG: 150 INJECTION, POWDER, LYOPHILIZED, FOR SOLUTION INTRAVENOUS at 12:34

## 2024-10-21 RX ADMIN — SODIUM CHLORIDE 500 ML: 0.9 INJECTION, SOLUTION INTRAVENOUS at 12:12

## 2024-10-21 RX ADMIN — POTASSIUM CHLORIDE: 2 INJECTION, SOLUTION, CONCENTRATE INTRAVENOUS at 14:11

## 2024-10-21 RX ADMIN — SODIUM CHLORIDE 20 ML/HR: 0.9 INJECTION, SOLUTION INTRAVENOUS at 12:12

## 2024-10-21 RX ADMIN — DEXAMETHASONE SODIUM PHOSPHATE: 10 INJECTION, SOLUTION INTRAMUSCULAR; INTRAVENOUS at 12:14

## 2024-10-21 RX ADMIN — ALTEPLASE 2 MG: 2.2 INJECTION, POWDER, LYOPHILIZED, FOR SOLUTION INTRAVENOUS at 12:04

## 2024-10-21 RX ADMIN — CISPLATIN 59.1 MG: 1 INJECTION, SOLUTION INTRAVENOUS at 13:11

## 2024-10-21 NOTE — PROGRESS NOTES
Pt arrived to unit weak, fatigued, with throat pain. Dr Medina aware of Hgb=7.5, OK given to proceed with cycle 6 reduced dose Cisplatin infusion. Dr Medina will reorder pt's oxycodone for pain mgmt as pt states she has run out of her prescription. Pt tolerated treatment today as ordered. IV hydration with electrolyte replacement infusing presently as ordered. Pt aware of next appt on Wednesday 10/23 0815 for IV hydration again. Pt will have hydration and central labs this Friday. Starting next week, pt no longer will be receiving Chemotherapy, just IV hydration M-W-F. Pt scheduled accordingly and [facility] arranged as well. Pt given AVS with all appts listed

## 2024-10-21 NOTE — PATIENT INSTRUCTIONS
Nutrition Rx & Recommendations:  Diet: enteral nutrition as 1' source of nutrition  Stay hydrated by sipping fluids of choice/tolerance throughout the day.    Follow proper oral care; Try baking soda/salt water rinse recipe (mix 3/4 tsp salt + 1 tsp baking soda + 1 qt water; rinse with plain water after using) in Eating Hints book (pg 18).  Brush your teeth before/after meals & before bed.  Weigh yourself regularly. If you notice weight loss, make an effort to increase your daily food/calorie intake. If you continue to notice loss after these efforts, reach out to your dietitian to establish a plan to stabilize weight.   TF plan - stop using Ensure via Peg, and increase Jevity to 6 cartons/day  TF Goal: 2 containers (474 mL) 3 times daily of Jevity 1.5  Water Flushin mL free water flush pre/post each bolus (360 mL water) + additional 150 mL water flushes 4 times daily (600 mL water).   TF provides: 1422 mL total volume (6 cartons), 2133 kcal, 91g protein, 1081 mL free water, 2041 mL total fluid  Allow for substitutions  Your syringes are each 60 mL or 2 fl oz.  Always flush your feeding tube with 60 mL room-temp water 1-2 times daily while feeding tube is not in use.  Call [facility] when you have 10 days left of TF supplies: [phone], option 3 (customer support).      Follow Up Plan: 24 after RT  Recommend Referral to Other Providers: none at this time

## 2024-10-21 NOTE — PROGRESS NOTES
Outpatient Oncology Nutrition Consultation   Type of Consult: Follow Up   Care Location: [facility]    Reason for referral: Received notification by [facility]PE ZAPATA on 8/21/24 that pt has triggered for oncology nutrition care (reason for referral: HNC dx and Malnutrition Screening Tool (MST) Triggers: scored a 0 indicating No recent wt loss and is not eating poorly due to a decreased appetite. 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  - grade 2  - %  - AK 95%  - HER2 negative     12/20/2018 Surgery    Right partial mastectomy and SLN biopsy:  Infiltrating ductal carcinoma, Grade 2/3, felt to be between 2.0 cm and 2.5 cm in greatest dimension  - No invasive tumor is seen at inked margins, but there is a focus of DCIS seen within approximately 1mm of the inked medial margin  - no LVI  - no LCIS  - 0/2 LN's  at least Stage IIA - pT2, pN0, cM0, G2.    - Dr Coronado     12/20/2018 -  Cancer Staged    Stage IIA - pT2, pN0, cM0, G2.       1/4/2019 Genomic Testing    Oncotype DX score: 13     2/1/2019 -  Hormone Therapy    anastrozole 1 mg daily as adjuvant endocrine therapy    - Dr Daley       2/14/2019 -  "4/3/2019 Radiation    Course: C1    Plan ID Energy Fractions Dose per Fraction (cGy) Dose Correction (cGy) Total Dose Delivered (cGy) Elapsed Days   R Breast 10X/6X 25 / 25 200 0 5,000 40   R BRST BOOST 16E 5 / 5 200 0 1,000 7      Treatment dates:  C1: 2/14/2019 - 4/3/2019       Metastatic colon cancer to liver (HCC)   7/6/2023 Genetic Testing    CARIS Results:   KRAS Pathogenic Variant Exon 2  p.G12D : lack of benefit of cetuximab, panitumumab Level 2   No other actionable mutation; MSI stable; TMB low   MiFOLOXAi Results: \"Decreased Benefit of FOLFOX + Bevacizumab in first line metastatic CRC.  This patient may achieve improved results by receiving an alternative to FOLFOX, such as FOLFIRI, as their initial regimen. As an adjustment to frontline FOLFOXIRI following toxicity: This patient may achieve improved results by removing the oxaliplatin portion of their regimen\".         7/11/2023 Initial Diagnosis    Metastatic colon cancer to liver (HCC)     7/13/2023 -  Cancer Staged    Staging form: Colon and Rectum, AJCC 8th Edition  - Clinical stage from 7/13/2023: cT3, cN0, pM1 - Signed by Harika Medina DO on 9/28/2023  Total positive nodes: 0       7/31/2023 - 8/1/2024 Chemotherapy    cyanocobalamin, 1,000 mcg, Intramuscular, Every 30 days, 7 of 9 cycles  Administration: 1,000 mcg (5/9/2024), 1,000 mcg (5/24/2024), 1,000 mcg (6/20/2024), 1,000 mcg (7/3/2024), 1,000 mcg (7/18/2024), 1,000 mcg (8/1/2024)  potassium chloride, 20 mEq, Intravenous, Once, 2 of 2 cycles  Administration: 20 mEq (11/6/2023), 20 mEq (11/20/2023)  alteplase (CATHFLO), 2 mg, Intracatheter, Every 1 Minute as needed, 21 of 23 cycles  Administration: 2 mg (1/3/2024)  pegfilgrastim (NEULASTA), 6 mg, Subcutaneous, Once, 12 of 12 cycles  Administration: 6 mg (10/25/2023), 6 mg (11/8/2023), 6 mg (11/22/2023), 6 mg (12/6/2023), 6 mg (12/20/2023), 6 mg (1/12/2024), 6 mg (1/25/2024), 6 mg (4/25/2024), 6 mg (5/9/2024), 6 mg (5/24/2024), 6 mg " (6/20/2024)  fluorouracil (ADRUCIL), 895 mg, Intravenous, Once, 21 of 23 cycles  Dose modification: 320 mg/m2 (original dose 400 mg/m2, Cycle 11)  Administration: 900 mg (7/31/2023), 900 mg (8/14/2023), 900 mg (8/28/2023), 900 mg (9/11/2023), 900 mg (9/25/2023), 900 mg (10/9/2023), 900 mg (10/23/2023), 895 mg (11/6/2023), 895 mg (11/20/2023), 895 mg (12/4/2023), 700 mg (12/18/2023), 680 mg (1/10/2024), 680 mg (1/23/2024), 625 mg (4/23/2024), 625 mg (5/7/2024), 625 mg (5/22/2024), 625 mg (6/4/2024), 625 mg (6/18/2024), 625 mg (7/1/2024), 625 mg (7/16/2024), 625 mg (7/30/2024)  nivolumab (OPDIVO) IVPB, 240 mg (100 % of original dose 240 mg), Intravenous, Once, 13 of 15 cycles  Dose modification: 240 mg (original dose 240 mg, Cycle 9)  Administration: 240 mg (11/20/2023), 240 mg (12/4/2023), 240 mg (12/18/2023), 240 mg (1/10/2024), 240 mg (1/23/2024), 240 mg (4/23/2024), 240 mg (5/7/2024), 240 mg (5/22/2024), 240 mg (6/4/2024), 240 mg (6/18/2024), 240 mg (7/1/2024), 240 mg (7/16/2024), 240 mg (7/30/2024)  leucovorin calcium IVPB, 896 mg, Intravenous, Once, 21 of 23 cycles  Administration: 900 mg (7/31/2023), 900 mg (8/14/2023), 900 mg (8/28/2023), 900 mg (9/11/2023), 900 mg (9/25/2023), 900 mg (10/9/2023), 900 mg (10/23/2023), 900 mg (11/6/2023), 900 mg (11/20/2023), 900 mg (12/4/2023), 900 mg (12/18/2023), 850 mg (1/10/2024), 850 mg (1/23/2024), 800 mg (4/23/2024), 800 mg (5/7/2024), 800 mg (5/22/2024), 800 mg (6/4/2024), 800 mg (6/18/2024), 800 mg (7/1/2024), 800 mg (7/16/2024), 800 mg (7/30/2024)  oxaliplatin (ELOXATIN) chemo infusion, 85 mg/m2 = 190.4 mg, Intravenous, Once, 12 of 12 cycles  Dose modification: 68 mg/m2 (original dose 85 mg/m2, Cycle 11, Reason: Other (Must fill in a comment), Comment: increased fatigue)  Administration: 190.4 mg (7/31/2023), 190.4 mg (8/14/2023), 200 mg (8/28/2023), 200 mg (9/11/2023), 200 mg (9/25/2023), 200 mg (10/9/2023), 200 mg (10/23/2023), 200 mg (11/6/2023), 200 mg  (11/20/2023), 190.4 mg (12/4/2023), 150 mg (12/18/2023), 150 mg (1/10/2024)  fluorouracil (ADRUCIL) ambulatory infusion Soln, 1,200 mg/m2/day = 5,375 mg, Intravenous, Over 46 hours, 21 of 23 cycles     9/26/2023 -  Cancer Staged    Staging form: Colon and Rectum, AJCC 8th Edition  - Pathologic: pT3, pN0, cM1 - Signed by Vickie Israel MD on 4/3/2024  Total positive nodes: 0       3/12/2024 Surgery    A. Terminal ileum, appendix, right colon (right hemicolectomy):  - Adenocarcinoma (5.2cm)  - Forty-nine (49) lymph nodes negative for carcinoma (0/49)    - Acellular mucin present in one (1) lymph node   - See staging synoptic (ypT3N0)     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HCC)   7/27/2023 Initial Diagnosis    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HCC)     7/27/2023 -  Cancer Staged    Staging form: Pharynx - Oropharynx, AJCC 8th Edition  - Clinical stage from 7/27/2023: Stage III (cT1, cN3, cM0, p16+) - Signed by Evan Plummer MD on 7/27/2023  Stage prefix: Initial diagnosis       9/17/2024 -  Chemotherapy    alteplase (CATHFLO), 2 mg, Intracatheter, Every 1 Minute as needed, 6 of 6 cycles  Administration: 2 mg (10/21/2024)  CISplatin (PLATINOL) infusion, 70 mg (original dose 40 mg/m2), Intravenous, Once, 6 of 6 cycles  Dose modification: 70 mg (original dose 40 mg/m2, Cycle 1, Reason: Anticipated Tolerance), 30 mg/m2 (original dose 40 mg/m2, Cycle 4, Reason: Neuropathy, Comment: dose reduction to 30 mg/m2 due to neuropathy)  Administration: 70 mg (9/17/2024), 70 mg (9/23/2024), 70 mg (9/30/2024), 59.1 mg (10/7/2024), 59.1 mg (10/14/2024)  sodium chloride, 500 mL, Intravenous, Once, 6 of 6 cycles  Administration: 500 mL (9/17/2024), 500 mL (9/17/2024), 500 mL (9/23/2024), 500 mL (9/23/2024), 500 mL (9/30/2024), 500 mL (9/30/2024), 500 mL (10/7/2024), 500 mL (10/7/2024), 500 mL (10/14/2024), 500 mL (10/14/2024), 500 mL (10/21/2024)  fosaprepitant (EMEND) IVPB, 150 mg, Intravenous, Once, 6 of 6  cycles  Administration: 150 mg (9/17/2024), 150 mg (9/23/2024), 150 mg (9/30/2024), 150 mg (10/7/2024), 150 mg (10/14/2024)  IVPB builder, , Intravenous, Once, 1 of 1 cycle       Past Medical & Surgical Hx:   Patient Active Problem List   Diagnosis    Primary hypertension    Mixed hyperlipidemia    Malignant neoplasm of right female breast (HCC)    Vitamin D deficiency    Prediabetes    Right thyroid nodule    GERD (gastroesophageal reflux disease)    Obesity    Metastatic colon cancer to liver (HCC)    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HCC)    Poor appetite    Nutritional anemia    Dehydration    Drug-induced constipation    Chemotherapy induced neutropenia (HCC)    Stage 3a chronic kidney disease (HCC)    Thrombocytopenia (HCC)    Neuropathy    Diastolic dysfunction    Hypokalemia    Leukemoid reaction    GOGO (acute kidney injury) (HCC)    Acute post-operative pain    At risk for constipation    At risk for delirium    Advance care planning    Physical deconditioning    History of CVA (cerebrovascular accident)    Chronic nasal congestion    Elevated LFTs    Vitamin B12 deficiency     Past Medical History:   Diagnosis Date    Anemia     Arthritis     Body mass index (BMI) 40.0-44.9, adult (HCC)     Breast cancer (HCC) 12/17/2018    Cancer (HCC)     right breast, colon, liver, right tonsil    Cervical lymphadenopathy     CT neck on 3/30/2023- Large right level 2A lymphadenopathy as described above and suspicious for neoplasm.  Correlation with histopathology is recommended.  Mild asymmetric prominence of the right palatine tonsil with otherwise no definitive nodular enhancing lesions identified along the course of the aerodigestive tract.     5/26/23- FNA of this node was nonrevealing for tissue etiology, but it d    Encounter for screening for HIV 07/07/2022    Follow-up examination 04/04/2023    GERD (gastroesophageal reflux disease)     Hyperlipidemia     Hypertension     Obesity     Stroke (HCC)     TIA      Stroke (HCC)     TIA     Transaminitis 2021    Vasomotor rhinitis     Refilled flonase today. Stopped taking since 2022     Past Surgical History:   Procedure Laterality Date    BREAST BIOPSY Right 2018    us guided bx cancer    BREAST SURGERY      COLONOSCOPY      ESOPHAGOSCOPY N/A 2023    Procedure: ESOPHAGOSCOPY;  Surgeon: David Chapa MD;  [facility];  Service: ENT    HEMICOLOECTOMY W/ ANASTOMOSIS Right 3/12/2024    Procedure: RIGHT COLECTOMY WITH ROBOTICS;  Surgeon: Vickie Israel MD;  [facility]: [facility];  Service: Surgical Oncology    IR BIOPSY LIVER MASS  2023    IR CRYOABLATION  6/3/2024    IR GASTROSTOMY TUBE PLACEMENT  10/2/2024    IR PORT CHECK  2024    IR PORT PLACEMENT  2023    IR PORT STRIPPING  2024    LAPAROTOMY N/A 3/12/2024    Procedure: LAPAROTOMY EXPLORATORY W/ ROBOTICS;  Surgeon: Vickie Israel MD;  [facility];  Service: Surgical Oncology    OR BIOPSY NASOPHARYNX VISIBLE LESION SIMPLE N/A 10/9/2024    Procedure: NASOPHARYNGOSCOPY with biospy;  Surgeon: Juanito Matthew MD;  [facility];  Service: ENT    [facility] W/CELL WASHG SPX N/A 2023    Procedure: BRONCHOSCOPY;  Surgeon: David Chapa MD;  [facility];  Service: ENT    OR LARYNGOSCOPY W/BIOPSY MICROSCOPE/TELESCOPE N/A 2023    Procedure: MICRODIRECT LARYNGOSCOPY WITH BIOPSY;  Surgeon: David Chapa MD;  [facility];  Service: ENT    OR LARYNGOSCOPY W/WO TRACHEOSCOPY DX EXCEPT  N/A 10/9/2024    Procedure: DIRECT LARYNGOSCOPY;  Surgeon: Juanito Matthew MD;  [facility];  Service: ENT    OR MASTECTOMY PARTIAL W/AXILLARY LYMPHADENECTOMY Right 2018    Procedure: ULTRASOUND LOCALIZED PARTIAL MASTECTOMY W/SENTINEL NODE BIOPSY POSS AXILLARY DISSECTION;  Surgeon: Zahida Coronado MD;  Location: [facility];  Service: General    OR TONSILLECTOMY PRIMARY/SECONDARY AGE 12/>  N/A 10/9/2024    Procedure: TONSILLECTOMY;  Surgeon: Juanito Matthew MD;  [facility];  Service: ENT    TUBAL LIGATION      US GUIDED BREAST BIOPSY RIGHT COMPLETE Right 11/23/2018    US GUIDED INJECTION FOR RESEARCH STUDY  12/20/2018    US GUIDED INJECTION FOR RESEARCH STUDY  12/07/2018    US GUIDED INJECTION FOR RESEARCH STUDY  05/03/2019    US GUIDED LYMPH NODE BIOPSY RIGHT  05/26/2023       Review of Medications:   Vitamins, Supplements and Herbals: No, pt denies taking supplements    Current Outpatient Medications:     anastrozole (ARIMIDEX) 1 mg tablet, Take 1 tablet (1 mg total) by mouth daily, Disp: 90 tablet, Rfl: 3    atorvastatin (LIPITOR) 40 mg tablet, Take 1 tablet (40 mg total) by mouth daily at bedtime, Disp: 30 tablet, Rfl: 2    diphenhydramine, lidocaine, Al/Mg hydroxide, simethicone (Magic Mouthwash) SUSP, Swish and swallow 10 mL every 4 (four) hours as needed for mouth pain or discomfort, Disp: 480 mL, Rfl: 1    docusate sodium (COLACE) 50 mg capsule, Take 1 capsule (50 mg total) by mouth 2 (two) times a day, Disp: 90 capsule, Rfl: 1    ergocalciferol (VITAMIN D2) 50,000 units, Take 1 capsule (50,000 Units total) by mouth once a week, Disp: 90 capsule, Rfl: 3    ferrous sulfate 325 (65 Fe) mg tablet, Take 1 tablet (325 mg total) by mouth daily with breakfast, Disp: 30 tablet, Rfl: 0    folic acid (FOLVITE) 1 mg tablet, Take 1 tablet (1 mg total) by mouth in the morning, Disp: 90 tablet, Rfl: 3    gabapentin (NEURONTIN) 300 mg capsule, Take 1 pills in the morning, 1 pill at lunch and 2 pills before bed, Disp: 120 capsule, Rfl: 3    hydrocortisone 1 % ointment, , Disp: , Rfl:     ibuprofen (MOTRIN) 100 mg/5 mL suspension, Take 20 mL (400 mg total) by mouth every 6 (six) hours as needed for moderate pain, Disp: 473 mL, Rfl: 0    lidocaine-prilocaine (EMLA) cream, Apply topically as needed for mild pain, Disp: 30 g, Rfl: 3    losartan (COZAAR) 50 mg tablet, Take 1 tablet (50 mg total) by  mouth daily, Disp: 90 tablet, Rfl: 5    mirtazapine (REMERON) 15 mg tablet, Take 1 tablet (15 mg total) by mouth daily at bedtime Patient is also on a 30 mg tablet, for a total dose of 45 mg, Disp: 30 tablet, Rfl: 3    mirtazapine (REMERON) 30 mg tablet, Take 1 tablet (30 mg total) by mouth daily at bedtime, Disp: 30 tablet, Rfl: 3    omeprazole (PriLOSEC) 20 mg delayed release capsule, Take 1 capsule (20 mg total) by mouth daily, Disp: 30 capsule, Rfl: 5    ondansetron (ZOFRAN) 8 mg tablet, Take 1 tablet (8 mg total) by mouth every 8 (eight) hours as needed for nausea or vomiting, Disp: 90 tablet, Rfl: 2    oxyCODONE (ROXICODONE) 5 mg/5 mL solution, Take 5 mL (5 mg total) by mouth every 6 (six) hours as needed for severe pain ((breakthrough pain)) Max Daily Amount: 20 mg, Disp: 100 mL, Rfl: 0    potassium chloride (Klor-Con M20) 20 mEq tablet, Take 1 tablet (20 mEq total) by mouth 2 (two) times a day, Disp: 90 tablet, Rfl: 1    prochlorperazine (COMPAZINE) 10 mg tablet, Take 1 tablet (10 mg total) by mouth every 6 (six) hours as needed for nausea or vomiting, Disp: 90 tablet, Rfl: 2    pyridoxine (VITAMIN B6) 50 mg tablet, Take 1 tablet (50 mg total) by mouth daily, Disp: 90 tablet, Rfl: 3    vitamin B-12 (VITAMIN B-12) 1,000 mcg tablet, , Disp: , Rfl:   No current facility-administered medications for this visit.    Facility-Administered Medications Ordered in Other Visits:     alteplase (CATHFLO) injection 2 mg, 2 mg, Intracatheter, Q1MIN PRN, Harika Agostino, DO, 2 mg at 10/21/24 1204    CISplatin (PLATINOL) 59.1 mg in sodium chloride 0.9 % 250 mL infusion, 30 mg/m2 (Treatment Plan Recorded), Intravenous, Once, Harika Agostino, DO    potassium chloride 20 mEq, magnesium sulfate 4 g in sodium chloride 0.9 % 500 mL IVPB, , Intravenous, Once, Harika Agostino, DO    sodium chloride 0.9 % bolus 500 mL, 500 mL, Intravenous, Once, Harika Agostino, DO, Last Rate: 500 mL/hr at 10/21/24 1212, 500 mL at 10/21/24  "1212    Most Recent Lab Results:   Lab Results   Component Value Date    WBC 3.36 (L) 10/18/2024    NEUTROABS 2.69 10/18/2024    IRON 55 05/07/2024    TIBC 292 05/07/2024    FERRITIN 145 05/07/2024    CHOLESTEROL 167 04/24/2024    TRIG 137 04/24/2024    HDL 43 (L) 04/24/2024    LDLCALC 97 04/24/2024    ALT 13 10/18/2024    AST 18 10/18/2024    ALB 3.3 (L) 10/18/2024    SODIUM 141 10/18/2024    SODIUM 144 10/16/2024    K 3.4 (L) 10/18/2024    K 3.8 10/16/2024     10/18/2024    BUN 36 (H) 10/18/2024    BUN 41 (H) 10/16/2024    CREATININE 1.00 10/18/2024    CREATININE 1.15 10/16/2024    EGFR 58 10/18/2024    PHOS 1.7 (L) 03/15/2024    PHOS 2.4 03/14/2024    TSH 1.58 01/03/2022    GLUCOSE 209 (H) 03/12/2024    POCGLU 101 08/05/2024    GLUF 95 09/13/2024    GLUF 101 (H) 06/03/2024    GLUC 113 10/18/2024    HGBA1C 5.5 02/21/2024    HGBA1C 5.9 (H) 06/13/2023    HGBA1C 6.1 (H) 07/09/2022    CALCIUM 8.6 10/18/2024    FOLATE >20.0 (H) 05/23/2019    MG 1.3 (L) 10/18/2024       Anthropometric Measurements:   Height: 66\"  Ht Readings from Last 1 Encounters:   10/21/24 5' 5.98\" (1.676 m)     Wt Readings from Last 20 Encounters:   10/21/24 86.6 kg (190 lb 14.7 oz)   10/15/24 88.5 kg (195 lb)   10/14/24 87.1 kg (192 lb)   10/10/24 89.3 kg (196 lb 13.9 oz)   10/09/24 89.4 kg (197 lb 1.5 oz)   10/07/24 87 kg (191 lb 12.8 oz)   10/02/24 88.9 kg (195 lb 15.8 oz)   10/01/24 89.4 kg (197 lb)   09/30/24 88 kg (194 lb 0.1 oz)   09/23/24 88.2 kg (194 lb 7.1 oz)   09/17/24 90 kg (198 lb 6.6 oz)   09/13/24 88.5 kg (195 lb)   09/10/24 89.8 kg (198 lb)   09/03/24 89.8 kg (198 lb)   08/20/24 92.2 kg (203 lb 3.2 oz)   08/16/24 90.3 kg (199 lb)   08/06/24 88.9 kg (196 lb)   07/30/24 87.8 kg (193 lb 9 oz)   07/16/24 89 kg (196 lb 3.4 oz)   07/12/24 87.7 kg (193 lb 6.4 oz)       Weight History:   Usual Weight: 275#  Varian: (2/22/19) 264.6#, (4/2/19) 263.4, (9/16/24) 197.4#, (9/23/24) 194#, (9/30/24) 192.8#, (10/7/24) 190.2#, (10/11/24) " 194.6#, (10/14/24) 191#, (10/17/24) 192.8#, (10/21/24) 190#  Home Scale: n/a    Oncology Nutrition-Anthropometrics      Flowsheet Row Nutrition from 10/21/2024 in [facility] Nutrition from 10/14/2024 in [facility]   Patient [age] (years): 69 years 69 years   Patient (female) height (in): 66 in 66 in   Current Weight to be used for anthropometric calculations (lbs) 190.9 lbs 192 lbs   Current Weight to be used for anthropometric calculations (kg) 86.8 kg 87.3 kg   BMI: 30.8 31   IBW female: 130 lbs 130 lbs   IBW (kg) female: 59.1 kg 59.1 kg   IBW % (female) 146.8 % 147.7 %   Adjusted BW (female): 145.2 lbs 145.5 lbs   Adjusted BW kg (female): 66 kg 66.1 kg   % weight change after 1 week: -0.6 % 0.1 %   Weight change after 1 week (lbs) -1.1 lbs 0.2 lbs   % weight change after 1 month: -1.8 % -1.5 %   Weight change after 1 month (lbs) -3.5 lbs -3 lbs   % weight change after 3 months: -2.7 % -2.1 %   Weight change after 3 months (lbs) -5.3 lbs -4.2 lbs            Nutrition-Focused Physical Findings: none observed    Food/Nutrition-Related History & Client/Social History:    Current Nutrition Impact Symptoms:  [] Nausea  [x] Reduced Appetite  [] Acid Reflux    [] Vomiting  [x] Unintended Wt Loss [] Malabsorption    [] Diarrhea -resolved [] Unintended Wt Gain  [] Dumping Syndrome    [] Constipation  [] Thick Mucous/Secretions  [] Abdominal Pain    [] Dysgeusia (Altered Taste)  [x] Xerostomia (Dry Mouth)  [] Gas    [] Dysosmia (Altered Smell)  [] Thrush  [] Difficulty Chewing    [] Oral Mucositis (Sore Mouth)  [x] Fatigue  [] Hyperglycemia   Lab Results   Component Value Date    HGBA1C 5.5 02/21/2024      [x] Odynophagia  [] Esophagitis  [] Other:    [x] Dysphagia -enteral nutrition as 1' source of nutrition [] Early Satiety  [] No Problems Eating      Food Allergies & Intolerances: no    Current Diet: Tube Feeding  Current Nutrition Intake:  Unchanged from last visit  Appetite: Fair   Nutrition Route: PEG  Oral Care: brushes daily  Activity level: ADLs.   More difficulty with TF administration lately d/t neuropathy. Family is helping. VNA also has been ordered.     24 Hr Diet Recall:   No solid foods. Just liquids:    Beverages: water (sips throughout the day), Ensure (10oz x2)- but puts these through her PEG  Supplements:   Ensure Complete (10 oz, 350 kcal, 30 g pro) 2 times daily    EN Recall:  DME: [employer]  Access Type: PEG  Formula: Jevity 1.5  Method: Bolus  Regimen: 16oz (474 mL) 2 times per day of Jevity 1.5 via PEG (gravity syringe method to administer boluses; 1 container infusing over ~15-20 minutes).  Flush: 60 mL free water flush post each bolus (60 mL x 2 boluses = 120 mL  EN providin mL volume (4 cartons/day), 1422 kcal (71% est needs), 61g protein (76% est needs), 721 mL free water, 841mL total water (42% est needs).      Oncology Nutrition-Estimated Needs      Flowsheet Row Nutrition from 2024 in [facility] Nutrition from 2024 in [facility]   Weight type used Adjusted weight Actual weight   Weight in kilograms (kg) used for estimated needs 66.4 kg 90 kg   Energy needs formula:  30-35 kcal/kg 30-35 kcal/kg   Energy needs based on 30 kcal/k kcal 2700 kcal   Energy needs based on 35 kcal/k kcal 3150 kcal   Protein needs formula: 1.2-1.5 g/kg 1.2-1.5 g/kg   Protein needs based on 1.2 g/k g 108 g   Protein needs based on 1.5 g/kg 100 g 135 g   Fluid needs formula: 30-35 mL/kg 30-35 mL/kg   Fluid needs based on 30 mL/kg 1992 mL 2700 mL   Fluid needs in ounces 67 oz 91 oz   Fluid needs based on 35 mL/kg 2324 mL 3150 mL   Fluid needs in ounces 79 oz 106 oz             Discussion & Intervention:   Maira was evaluated today for an RD follow up regarding HNC.  Maira is currently undergoing tx for HNC .  Met with Maira  today during her infusion. She reports doing well with tolerating her TF- continues to do 4 cartons of Jevity 1.5 daily, and recently started putting 2 Ensure shakes through her PEG as they are getting more difficult to consume orally. Discussed using jevity 1.5 and increasing to 6 cartons/day, and stop using Ensure via Peg. She was in agreement. Discussed water flushes. She states she sometimes will have more trouble administering TF d/t neuropathy. Her daughter helps. Per Dr. Medina's note last week, plan is for VNA to assist with feeds as well.    Reviewed 24 hour recall, which revealed an inadequate enteral intake, and discussed ways to increase kcal, protein, and fluid intakes and optimize nutrient intake.  Also reviewed the importance of wt management throughout the tx process and the role of enteral nutrition in managing wt and overall health.  Based on today's assessment, discussion included: MNT for:  xerostomia, fluid, enteral nutrition, practicing proper oral care, adequate hydration & fluid choices, practicing proper feeding tube use & care, adherence to and compliance with enteral nutrition plan of care, and individualized enteral nutrition recommendations & plan:   .   Moving forward, Maira was encouraged to increase TF to goal, and fluid via PEG    Materials Provided: not applicable  All questions and concerns addressed during today’s visit.  Maira has RD contact information.    Nutrition Diagnosis:   Inadequate Energy Intake related to physiological causes, disease state and treatment related issues as evidenced by food recall, wt loss and discussion with pt and/or family.  Increased Nutrient Needs (kcal & pro) related to increased demand for nutrients and disease state as evidenced by cancer dx and pt undergoing tx for cancer.  Swallowing Difficulty related to diagnosis/treatment related causes as evidenced by RT tx, need for feeding tube.  Monitoring & Evaluation:   Goals:  weight  maintenance/stabilization  adequate nutrition impact symptom management  pt to meet >/=75% estimated nutrition needs daily  achieve tube feeding goal rate  increase fluid intake    Progress Towards Goals: Progressing    Nutrition Rx & Recommendations:  Diet: enteral nutrition as 1' source of nutrition  Stay hydrated by sipping fluids of choice/tolerance throughout the day.    Follow proper oral care; Try baking soda/salt water rinse recipe (mix 3/4 tsp salt + 1 tsp baking soda + 1 qt water; rinse with plain water after using) in Eating Hints book (pg 18).  Brush your teeth before/after meals & before bed.  Weigh yourself regularly. If you notice weight loss, make an effort to increase your daily food/calorie intake. If you continue to notice loss after these efforts, reach out to your dietitian to establish a plan to stabilize weight.   TF plan - stop using Ensure via Peg, and increase Jevity to 6 cartons/day  TF Goal: 2 containers (474 mL) 3 times daily of Jevity 1.5  Water Flushin mL free water flush pre/post each bolus (360 mL water) + additional 150 mL water flushes 4 times daily (600 mL water).   TF provides: 1422 mL total volume (6 cartons), 2133 kcal, 91g protein, 1081 mL free water, 2041 mL total fluid  Allow for substitutions  Your syringes are each 60 mL or 2 fl oz.  Always flush your feeding tube with 60 mL room-temp water 1-2 times daily while feeding tube is not in use.  Call Critical access hospital when you have 10 days left of TF supplies: [phone], option 3 (customer support).      Follow Up Plan:  24 after RT  Recommend Referral to Other Providers: none at this time

## 2024-10-22 ENCOUNTER — APPOINTMENT (OUTPATIENT)
Dept: RADIATION ONCOLOGY | Facility: CLINIC | Age: 69
End: 2024-10-22
Attending: RADIOLOGY
Payer: MEDICARE

## 2024-10-22 DIAGNOSIS — Z17.0 MALIGNANT NEOPLASM OF UPPER-OUTER QUADRANT OF RIGHT BREAST IN FEMALE, ESTROGEN RECEPTOR POSITIVE (HCC): Primary | ICD-10-CM

## 2024-10-22 DIAGNOSIS — C50.411 MALIGNANT NEOPLASM OF UPPER-OUTER QUADRANT OF RIGHT BREAST IN FEMALE, ESTROGEN RECEPTOR POSITIVE (HCC): Primary | ICD-10-CM

## 2024-10-22 PROCEDURE — 77014 CHG CT GUIDANCE RADIATION THERAPY FLDS PLACEMENT: CPT | Performed by: RADIOLOGY

## 2024-10-22 PROCEDURE — 77386 HB NTSTY MODUL RAD TX DLVR CPLX: CPT | Performed by: RADIOLOGY

## 2024-10-23 ENCOUNTER — HOSPITAL ENCOUNTER (OUTPATIENT)
Dept: INFUSION CENTER | Facility: CLINIC | Age: 69
Discharge: HOME | End: 2024-10-23
Payer: MEDICARE

## 2024-10-23 ENCOUNTER — APPOINTMENT (OUTPATIENT)
Dept: RADIATION ONCOLOGY | Facility: CLINIC | Age: 69
End: 2024-10-23
Attending: RADIOLOGY
Payer: MEDICARE

## 2024-10-23 VITALS
RESPIRATION RATE: 16 BRPM | SYSTOLIC BLOOD PRESSURE: 155 MMHG | DIASTOLIC BLOOD PRESSURE: 82 MMHG | HEART RATE: 70 BPM | TEMPERATURE: 97.4 F

## 2024-10-23 DIAGNOSIS — C50.411 MALIGNANT NEOPLASM OF UPPER-OUTER QUADRANT OF RIGHT BREAST IN FEMALE, ESTROGEN RECEPTOR POSITIVE (HCC): Primary | ICD-10-CM

## 2024-10-23 DIAGNOSIS — Z17.0 MALIGNANT NEOPLASM OF UPPER-OUTER QUADRANT OF RIGHT BREAST IN FEMALE, ESTROGEN RECEPTOR POSITIVE (HCC): Primary | ICD-10-CM

## 2024-10-23 LAB
ALBUMIN SERPL BCG-MCNC: 3.3 G/DL (ref 3.5–5)
ALP SERPL-CCNC: 96 U/L (ref 34–104)
ALT SERPL W P-5'-P-CCNC: 15 U/L (ref 7–52)
ANION GAP SERPL CALCULATED.3IONS-SCNC: 6 MMOL/L (ref 4–13)
AST SERPL W P-5'-P-CCNC: 19 U/L (ref 13–39)
BASOPHILS # BLD AUTO: 0.01 THOUSANDS/ΜL (ref 0–0.1)
BASOPHILS NFR BLD AUTO: 0 % (ref 0–1)
BILIRUB SERPL-MCNC: 0.27 MG/DL (ref 0.2–1)
BUN SERPL-MCNC: 43 MG/DL (ref 5–25)
CALCIUM ALBUM COR SERPL-MCNC: 9.3 MG/DL (ref 8.3–10.1)
CALCIUM SERPL-MCNC: 8.7 MG/DL (ref 8.4–10.2)
CHLORIDE SERPL-SCNC: 109 MMOL/L (ref 96–108)
CO2 SERPL-SCNC: 29 MMOL/L (ref 21–32)
CREAT SERPL-MCNC: 1.04 MG/DL (ref 0.6–1.3)
EOSINOPHIL # BLD AUTO: 0.02 THOUSAND/ΜL (ref 0–0.61)
EOSINOPHIL NFR BLD AUTO: 1 % (ref 0–6)
ERYTHROCYTE [DISTWIDTH] IN BLOOD BY AUTOMATED COUNT: 17.7 % (ref 11.6–15.1)
GFR SERPL CREATININE-BSD FRML MDRD: 55 ML/MIN/1.73SQ M
GLUCOSE SERPL-MCNC: 98 MG/DL (ref 65–140)
HCT VFR BLD AUTO: 21.7 % (ref 34.8–46.1)
HGB BLD-MCNC: 7.1 G/DL (ref 11.5–15.4)
IMM GRANULOCYTES # BLD AUTO: 0.01 THOUSAND/UL (ref 0–0.2)
IMM GRANULOCYTES NFR BLD AUTO: 0 % (ref 0–2)
LYMPHOCYTES # BLD AUTO: 0.23 THOUSANDS/ΜL (ref 0.6–4.47)
LYMPHOCYTES NFR BLD AUTO: 8 % (ref 14–44)
MAGNESIUM SERPL-MCNC: 1.6 MG/DL (ref 1.9–2.7)
MCH RBC QN AUTO: 28.2 PG (ref 26.8–34.3)
MCHC RBC AUTO-ENTMCNC: 32.7 G/DL (ref 31.4–37.4)
MCV RBC AUTO: 86 FL (ref 82–98)
MONOCYTES # BLD AUTO: 0.3 THOUSAND/ΜL (ref 0.17–1.22)
MONOCYTES NFR BLD AUTO: 10 % (ref 4–12)
NEUTROPHILS # BLD AUTO: 2.45 THOUSANDS/ΜL (ref 1.85–7.62)
NEUTS SEG NFR BLD AUTO: 81 % (ref 43–75)
NRBC BLD AUTO-RTO: 0 /100 WBCS
PLATELET # BLD AUTO: 150 THOUSANDS/UL (ref 149–390)
PMV BLD AUTO: 10.1 FL (ref 8.9–12.7)
POTASSIUM SERPL-SCNC: 3.8 MMOL/L (ref 3.5–5.3)
PROT SERPL-MCNC: 6.9 G/DL (ref 6.4–8.4)
RBC # BLD AUTO: 2.52 MILLION/UL (ref 3.81–5.12)
SODIUM SERPL-SCNC: 144 MMOL/L (ref 135–147)
WBC # BLD AUTO: 3.02 THOUSAND/UL (ref 4.31–10.16)

## 2024-10-23 PROCEDURE — 83735 ASSAY OF MAGNESIUM: CPT | Performed by: INTERNAL MEDICINE

## 2024-10-23 PROCEDURE — 77014 CHG CT GUIDANCE RADIATION THERAPY FLDS PLACEMENT: CPT | Performed by: INTERNAL MEDICINE

## 2024-10-23 PROCEDURE — 80053 COMPREHEN METABOLIC PANEL: CPT | Performed by: INTERNAL MEDICINE

## 2024-10-23 PROCEDURE — 85025 COMPLETE CBC W/AUTO DIFF WBC: CPT | Performed by: INTERNAL MEDICINE

## 2024-10-23 PROCEDURE — 77386 HB NTSTY MODUL RAD TX DLVR CPLX: CPT | Performed by: INTERNAL MEDICINE

## 2024-10-23 PROCEDURE — 77336 RADIATION PHYSICS CONSULT: CPT | Performed by: RADIOLOGY

## 2024-10-23 RX ADMIN — POTASSIUM CHLORIDE: 2 INJECTION, SOLUTION, CONCENTRATE INTRAVENOUS at 09:09

## 2024-10-23 NOTE — PROGRESS NOTES
Pt. Denied new symptoms or concerns today.  Labs obtained as ordered and sent to lab.  Pt. Tolerated NSS with Potasium and Magnesium without adverse event. Pt. Continues with radiation and Fluids with Labs MWF.  Pt. Will return on Friday at 1030.  AVS declined.

## 2024-10-23 NOTE — PLAN OF CARE
Problem: INFECTION - ADULT  Goal: Absence or prevention of progression during hospitalization  Description: INTERVENTIONS:  - Assess and monitor for signs and symptoms of infection  - Monitor lab/diagnostic results  - Monitor all insertion sites, i.e. indwelling lines, tubes, and drains  - Monitor endotracheal if appropriate and nasal secretions for changes in amount and color  - [place] appropriate cooling/warming therapies per order  - Administer medications as ordered  - Instruct and encourage patient and family to use good hand hygiene technique  - Identify and instruct in appropriate isolation precautions for identified infection/condition  Outcome: Progressing  Goal: Absence of fever/infection during neutropenic period  Description: INTERVENTIONS:  - Monitor WBC    Outcome: Progressing     Problem: Knowledge Deficit  Goal: Patient/family/caregiver demonstrates understanding of disease process, treatment plan, medications, and discharge instructions  Description: Complete learning assessment and assess knowledge base.  Interventions:  - Provide teaching at level of understanding  - Provide teaching via preferred learning methods  Outcome: Progressing

## 2024-10-24 ENCOUNTER — APPOINTMENT (OUTPATIENT)
Dept: RADIATION ONCOLOGY | Facility: CLINIC | Age: 69
End: 2024-10-24
Attending: RADIOLOGY
Payer: MEDICARE

## 2024-10-24 PROCEDURE — 77386 HB NTSTY MODUL RAD TX DLVR CPLX: CPT | Performed by: STUDENT IN AN ORGANIZED HEALTH CARE EDUCATION/TRAINING PROGRAM

## 2024-10-24 PROCEDURE — 77014 CHG CT GUIDANCE RADIATION THERAPY FLDS PLACEMENT: CPT | Performed by: STUDENT IN AN ORGANIZED HEALTH CARE EDUCATION/TRAINING PROGRAM

## 2024-10-24 PROCEDURE — 77427 RADIATION TX MANAGEMENT X5: CPT | Performed by: RADIOLOGY

## 2024-10-25 ENCOUNTER — APPOINTMENT (OUTPATIENT)
Dept: RADIATION ONCOLOGY | Facility: CLINIC | Age: 69
End: 2024-10-25
Attending: RADIOLOGY
Payer: MEDICARE

## 2024-10-25 ENCOUNTER — HOSPITAL ENCOUNTER (OUTPATIENT)
Dept: INFUSION CENTER | Facility: CLINIC | Age: 69
End: 2024-10-25
Payer: MEDICARE

## 2024-10-25 ENCOUNTER — TELEPHONE (OUTPATIENT)
Dept: PALLIATIVE MEDICINE | Facility: CLINIC | Age: 69
End: 2024-10-25

## 2024-10-25 VITALS
RESPIRATION RATE: 18 BRPM | SYSTOLIC BLOOD PRESSURE: 148 MMHG | HEART RATE: 105 BPM | TEMPERATURE: 97.5 F | DIASTOLIC BLOOD PRESSURE: 91 MMHG

## 2024-10-25 DIAGNOSIS — C50.411 MALIGNANT NEOPLASM OF UPPER-OUTER QUADRANT OF RIGHT BREAST IN FEMALE, ESTROGEN RECEPTOR POSITIVE (HCC): Primary | ICD-10-CM

## 2024-10-25 DIAGNOSIS — Z17.0 MALIGNANT NEOPLASM OF UPPER-OUTER QUADRANT OF RIGHT BREAST IN FEMALE, ESTROGEN RECEPTOR POSITIVE (HCC): Primary | ICD-10-CM

## 2024-10-25 LAB
ALBUMIN SERPL BCG-MCNC: 3.4 G/DL (ref 3.5–5)
ALP SERPL-CCNC: 89 U/L (ref 34–104)
ALT SERPL W P-5'-P-CCNC: 14 U/L (ref 7–52)
ANION GAP SERPL CALCULATED.3IONS-SCNC: 9 MMOL/L (ref 4–13)
AST SERPL W P-5'-P-CCNC: 19 U/L (ref 13–39)
BASOPHILS # BLD MANUAL: 0 THOUSAND/UL (ref 0–0.1)
BASOPHILS NFR MAR MANUAL: 0 % (ref 0–1)
BILIRUB SERPL-MCNC: 0.31 MG/DL (ref 0.2–1)
BUN SERPL-MCNC: 42 MG/DL (ref 5–25)
CALCIUM ALBUM COR SERPL-MCNC: 9 MG/DL (ref 8.3–10.1)
CALCIUM SERPL-MCNC: 8.5 MG/DL (ref 8.4–10.2)
CHLORIDE SERPL-SCNC: 108 MMOL/L (ref 96–108)
CO2 SERPL-SCNC: 27 MMOL/L (ref 21–32)
CREAT SERPL-MCNC: 1.19 MG/DL (ref 0.6–1.3)
EOSINOPHIL # BLD MANUAL: 0.03 THOUSAND/UL (ref 0–0.4)
EOSINOPHIL NFR BLD MANUAL: 2 % (ref 0–6)
ERYTHROCYTE [DISTWIDTH] IN BLOOD BY AUTOMATED COUNT: 17.7 % (ref 11.6–15.1)
GFR SERPL CREATININE-BSD FRML MDRD: 47 ML/MIN/1.73SQ M
GLUCOSE SERPL-MCNC: 148 MG/DL (ref 65–140)
HCT VFR BLD AUTO: 21.9 % (ref 34.8–46.1)
HGB BLD-MCNC: 7.1 G/DL (ref 11.5–15.4)
LYMPHOCYTES # BLD AUTO: 0.29 THOUSAND/UL (ref 0.6–4.47)
LYMPHOCYTES # BLD AUTO: 17 % (ref 14–44)
MAGNESIUM SERPL-MCNC: 1.3 MG/DL (ref 1.9–2.7)
MCH RBC QN AUTO: 27.7 PG (ref 26.8–34.3)
MCHC RBC AUTO-ENTMCNC: 32.4 G/DL (ref 31.4–37.4)
MCV RBC AUTO: 86 FL (ref 82–98)
MONOCYTES # BLD AUTO: 0.07 THOUSAND/UL (ref 0–1.22)
MONOCYTES NFR BLD: 4 % (ref 4–12)
NEUTROPHILS # BLD MANUAL: 1.29 THOUSAND/UL (ref 1.85–7.62)
NEUTS BAND NFR BLD MANUAL: 3 % (ref 0–8)
NEUTS SEG NFR BLD AUTO: 74 % (ref 43–75)
PLATELET # BLD AUTO: 151 THOUSANDS/UL (ref 149–390)
PLATELET BLD QL SMEAR: ADEQUATE
PMV BLD AUTO: 10.4 FL (ref 8.9–12.7)
POTASSIUM SERPL-SCNC: 3.4 MMOL/L (ref 3.5–5.3)
PROT SERPL-MCNC: 7.3 G/DL (ref 6.4–8.4)
RBC # BLD AUTO: 2.56 MILLION/UL (ref 3.81–5.12)
RBC MORPH BLD: NORMAL
SODIUM SERPL-SCNC: 144 MMOL/L (ref 135–147)
WBC # BLD AUTO: 1.68 THOUSAND/UL (ref 4.31–10.16)

## 2024-10-25 PROCEDURE — 77386 HB NTSTY MODUL RAD TX DLVR CPLX: CPT | Performed by: STUDENT IN AN ORGANIZED HEALTH CARE EDUCATION/TRAINING PROGRAM

## 2024-10-25 PROCEDURE — 85027 COMPLETE CBC AUTOMATED: CPT | Performed by: INTERNAL MEDICINE

## 2024-10-25 PROCEDURE — 85007 BL SMEAR W/DIFF WBC COUNT: CPT | Performed by: INTERNAL MEDICINE

## 2024-10-25 PROCEDURE — 83735 ASSAY OF MAGNESIUM: CPT | Performed by: INTERNAL MEDICINE

## 2024-10-25 PROCEDURE — 77014 CHG CT GUIDANCE RADIATION THERAPY FLDS PLACEMENT: CPT | Performed by: STUDENT IN AN ORGANIZED HEALTH CARE EDUCATION/TRAINING PROGRAM

## 2024-10-25 PROCEDURE — 96365 THER/PROPH/DIAG IV INF INIT: CPT

## 2024-10-25 PROCEDURE — 96366 THER/PROPH/DIAG IV INF ADDON: CPT

## 2024-10-25 PROCEDURE — 80053 COMPREHEN METABOLIC PANEL: CPT | Performed by: INTERNAL MEDICINE

## 2024-10-25 RX ADMIN — POTASSIUM CHLORIDE: 2 INJECTION, SOLUTION, CONCENTRATE INTRAVENOUS at 10:55

## 2024-10-25 NOTE — PROGRESS NOTES
Received for hydration. No complaints offered. RPAC accessed without issue. Brisk blood return noted, flushes easily. Fluids running without issue.

## 2024-10-25 NOTE — TELEPHONE ENCOUNTER
Left message for pt to reschedule her appt with  on 11/12/24,at 9:30am. Provider will be out of the office. x1

## 2024-10-25 NOTE — PROGRESS NOTES
Tolerated hydration without issue. Pt to return 10/28 at 1030am. November calendar of appts printed and given to pt.

## 2024-10-28 ENCOUNTER — APPOINTMENT (OUTPATIENT)
Dept: RADIATION ONCOLOGY | Facility: CLINIC | Age: 69
End: 2024-10-28
Attending: RADIOLOGY
Payer: MEDICARE

## 2024-10-28 ENCOUNTER — HOSPITAL ENCOUNTER (OUTPATIENT)
Dept: INFUSION CENTER | Facility: CLINIC | Age: 69
Discharge: HOME | End: 2024-10-28
Payer: MEDICARE

## 2024-10-28 VITALS
DIASTOLIC BLOOD PRESSURE: 81 MMHG | SYSTOLIC BLOOD PRESSURE: 145 MMHG | TEMPERATURE: 97.4 F | HEART RATE: 109 BPM | RESPIRATION RATE: 20 BRPM

## 2024-10-28 DIAGNOSIS — Z17.0 MALIGNANT NEOPLASM OF UPPER-OUTER QUADRANT OF RIGHT BREAST IN FEMALE, ESTROGEN RECEPTOR POSITIVE (HCC): Primary | ICD-10-CM

## 2024-10-28 DIAGNOSIS — C50.411 MALIGNANT NEOPLASM OF UPPER-OUTER QUADRANT OF RIGHT BREAST IN FEMALE, ESTROGEN RECEPTOR POSITIVE (HCC): Primary | ICD-10-CM

## 2024-10-28 LAB
ALBUMIN SERPL BCG-MCNC: 3.3 G/DL (ref 3.5–5)
ALP SERPL-CCNC: 91 U/L (ref 34–104)
ALT SERPL W P-5'-P-CCNC: 26 U/L (ref 7–52)
ANION GAP SERPL CALCULATED.3IONS-SCNC: 7 MMOL/L (ref 4–13)
ANISOCYTOSIS BLD QL SMEAR: PRESENT
AST SERPL W P-5'-P-CCNC: 28 U/L (ref 13–39)
BASOPHILS # BLD MANUAL: 0 THOUSAND/UL (ref 0–0.1)
BASOPHILS NFR MAR MANUAL: 0 % (ref 0–1)
BILIRUB SERPL-MCNC: 0.23 MG/DL (ref 0.2–1)
BUN SERPL-MCNC: 31 MG/DL (ref 5–25)
CALCIUM ALBUM COR SERPL-MCNC: 9.2 MG/DL (ref 8.3–10.1)
CALCIUM SERPL-MCNC: 8.6 MG/DL (ref 8.4–10.2)
CHLORIDE SERPL-SCNC: 104 MMOL/L (ref 96–108)
CO2 SERPL-SCNC: 31 MMOL/L (ref 21–32)
CREAT SERPL-MCNC: 1.1 MG/DL (ref 0.6–1.3)
EOSINOPHIL # BLD MANUAL: 0 THOUSAND/UL (ref 0–0.4)
EOSINOPHIL NFR BLD MANUAL: 0 % (ref 0–6)
ERYTHROCYTE [DISTWIDTH] IN BLOOD BY AUTOMATED COUNT: 17.4 % (ref 11.6–15.1)
GFR SERPL CREATININE-BSD FRML MDRD: 51 ML/MIN/1.73SQ M
GLUCOSE SERPL-MCNC: 119 MG/DL (ref 65–140)
HCT VFR BLD AUTO: 21.7 % (ref 34.8–46.1)
HGB BLD-MCNC: 7 G/DL (ref 11.5–15.4)
LYMPHOCYTES # BLD AUTO: 0.33 THOUSAND/UL (ref 0.6–4.47)
LYMPHOCYTES # BLD AUTO: 21 % (ref 14–44)
MAGNESIUM SERPL-MCNC: 1.3 MG/DL (ref 1.9–2.7)
MCH RBC QN AUTO: 27.7 PG (ref 26.8–34.3)
MCHC RBC AUTO-ENTMCNC: 32.3 G/DL (ref 31.4–37.4)
MCV RBC AUTO: 86 FL (ref 82–98)
MONOCYTES # BLD AUTO: 0.14 THOUSAND/UL (ref 0–1.22)
MONOCYTES NFR BLD: 9 % (ref 4–12)
NEUTROPHILS # BLD MANUAL: 1.11 THOUSAND/UL (ref 1.85–7.62)
NEUTS BAND NFR BLD MANUAL: 5 % (ref 0–8)
NEUTS SEG NFR BLD AUTO: 65 % (ref 43–75)
PLATELET # BLD AUTO: 154 THOUSANDS/UL (ref 149–390)
PLATELET BLD QL SMEAR: ADEQUATE
PMV BLD AUTO: 10.5 FL (ref 8.9–12.7)
POTASSIUM SERPL-SCNC: 3.6 MMOL/L (ref 3.5–5.3)
PROT SERPL-MCNC: 7.3 G/DL (ref 6.4–8.4)
RBC # BLD AUTO: 2.53 MILLION/UL (ref 3.81–5.12)
SODIUM SERPL-SCNC: 142 MMOL/L (ref 135–147)
WBC # BLD AUTO: 1.58 THOUSAND/UL (ref 4.31–10.16)

## 2024-10-28 PROCEDURE — 77014 CHG CT GUIDANCE RADIATION THERAPY FLDS PLACEMENT: CPT | Performed by: RADIOLOGY

## 2024-10-28 PROCEDURE — 85027 COMPLETE CBC AUTOMATED: CPT | Performed by: INTERNAL MEDICINE

## 2024-10-28 PROCEDURE — 80053 COMPREHEN METABOLIC PANEL: CPT | Performed by: INTERNAL MEDICINE

## 2024-10-28 PROCEDURE — 85007 BL SMEAR W/DIFF WBC COUNT: CPT | Performed by: INTERNAL MEDICINE

## 2024-10-28 PROCEDURE — 77386 HB NTSTY MODUL RAD TX DLVR CPLX: CPT | Performed by: RADIOLOGY

## 2024-10-28 PROCEDURE — 96366 THER/PROPH/DIAG IV INF ADDON: CPT

## 2024-10-28 PROCEDURE — 83735 ASSAY OF MAGNESIUM: CPT | Performed by: INTERNAL MEDICINE

## 2024-10-28 PROCEDURE — 96365 THER/PROPH/DIAG IV INF INIT: CPT

## 2024-10-28 RX ADMIN — POTASSIUM CHLORIDE: 2 INJECTION, SOLUTION, CONCENTRATE INTRAVENOUS at 11:45

## 2024-10-28 NOTE — PROGRESS NOTES
"    Medical Oncology: Progress Note     Primary Care Provider: Fanta Turner MD       MRN: [id number] : 1955    Reason for Encounter: Follow-Up Visit.  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 On evaluation this morning, patient appears profoundly fatigued, and uncomfortable in comparison to previous. Significant pain is also appreciated with minimal speech, as well. Patient states that she has been administering Oxycodone 5 mg Per Tube Q6HR, but has experienced significant breakthrough pain within 4-5 hours of administration. She is not tolerating any oral-intake given the above-mentioned; with the exception of small sips of water. Otherwise, patient is entirely dependent on her PEG-tube for nutritional-support. She states that her daughter is now assisting her with management of her PEG-tube, given neuropathy-related issues with dexterity. Patient characterizes poor sleep-quality, as she frequently awakens at night with a \"choking-sensation.\" She denies subjective or measured fever; however, describes intermittent chills. Given ill-appearance, and uncontrolled pain, discussed direct-admission to [facility]. Patient is in agreement with the above-mentioned plan.    ECOG Performance Status: 2-Points.    Review of Systems:  All systems reviewed and negative except otherwise listed in the History of Present Illness.    Oncology History   Malignant neoplasm of right female breast " "(HCC)   11/23/2018 Initial Diagnosis    Malignant neoplasm of right female breast (HCC)     11/23/2018 Biopsy    Rt Breast US BX 1100 8cmfn, 4 passes 12g Mardenis:  - Invasive breast carcinoma of no special type (ductal NST/invasive ductal carcinoma).  - grade 2  - %  - CA 95%  - HER2 negative     12/20/2018 Surgery    Right partial mastectomy and SLN biopsy:  Infiltrating ductal carcinoma, Grade 2/3, felt to be between 2.0 cm and 2.5 cm in greatest dimension  - No invasive tumor is seen at inked margins, but there is a focus of DCIS seen within approximately 1mm of the inked medial margin  - no LVI  - no LCIS  - 0/2 LN's  at least Stage IIA - pT2, pN0, cM0, G2.    - Dr Coronado     12/20/2018 -  Cancer Staged    Stage IIA - pT2, pN0, cM0, G2.       1/4/2019 Genomic Testing    Oncotype DX score: 13     2/1/2019 -  Hormone Therapy    anastrozole 1 mg daily as adjuvant endocrine therapy    - Dr Daley       2/14/2019 - 4/3/2019 Radiation    Course: C1    Plan ID Energy Fractions Dose per Fraction (cGy) Dose Correction (cGy) Total Dose Delivered (cGy) Elapsed Days   R Breast 10X/6X 25 / 25 200 0 5,000 40   R BRST BOOST 16E 5 / 5 200 0 1,000 7      Treatment dates:  C1: 2/14/2019 - 4/3/2019       Metastatic colon cancer to liver (HCC)   7/6/2023 Genetic Testing    CARIS Results:   KRAS Pathogenic Variant Exon 2  p.G12D : lack of benefit of cetuximab, panitumumab Level 2   No other actionable mutation; MSI stable; TMB low   MiFOLOXAi Results: \"Decreased Benefit of FOLFOX + Bevacizumab in first line metastatic CRC.  This patient may achieve improved results by receiving an alternative to FOLFOX, such as FOLFIRI, as their initial regimen. As an adjustment to frontline FOLFOXIRI following toxicity: This patient may achieve improved results by removing the oxaliplatin portion of their regimen\".         7/11/2023 Initial Diagnosis    Metastatic colon cancer to liver (HCC)     7/13/2023 -  Cancer Staged    Staging form: " Colon and Rectum, AJCC 8th Edition  - Clinical stage from 7/13/2023: cT3, cN0, pM1 - Signed by Harika Medina DO on 9/28/2023  Total positive nodes: 0       7/31/2023 - 8/1/2024 Chemotherapy    cyanocobalamin, 1,000 mcg, Intramuscular, Every 30 days, 7 of 9 cycles  Administration: 1,000 mcg (5/9/2024), 1,000 mcg (5/24/2024), 1,000 mcg (6/20/2024), 1,000 mcg (7/3/2024), 1,000 mcg (7/18/2024), 1,000 mcg (8/1/2024)  potassium chloride, 20 mEq, Intravenous, Once, 2 of 2 cycles  Administration: 20 mEq (11/6/2023), 20 mEq (11/20/2023)  alteplase (CATHFLO), 2 mg, Intracatheter, Every 1 Minute as needed, 21 of 23 cycles  Administration: 2 mg (1/3/2024)  pegfilgrastim (NEULASTA), 6 mg, Subcutaneous, Once, 12 of 12 cycles  Administration: 6 mg (10/25/2023), 6 mg (11/8/2023), 6 mg (11/22/2023), 6 mg (12/6/2023), 6 mg (12/20/2023), 6 mg (1/12/2024), 6 mg (1/25/2024), 6 mg (4/25/2024), 6 mg (5/9/2024), 6 mg (5/24/2024), 6 mg (6/20/2024)  fluorouracil (ADRUCIL), 895 mg, Intravenous, Once, 21 of 23 cycles  Dose modification: 320 mg/m2 (original dose 400 mg/m2, Cycle 11)  Administration: 900 mg (7/31/2023), 900 mg (8/14/2023), 900 mg (8/28/2023), 900 mg (9/11/2023), 900 mg (9/25/2023), 900 mg (10/9/2023), 900 mg (10/23/2023), 895 mg (11/6/2023), 895 mg (11/20/2023), 895 mg (12/4/2023), 700 mg (12/18/2023), 680 mg (1/10/2024), 680 mg (1/23/2024), 625 mg (4/23/2024), 625 mg (5/7/2024), 625 mg (5/22/2024), 625 mg (6/4/2024), 625 mg (6/18/2024), 625 mg (7/1/2024), 625 mg (7/16/2024), 625 mg (7/30/2024)  nivolumab (OPDIVO) IVPB, 240 mg (100 % of original dose 240 mg), Intravenous, Once, 13 of 15 cycles  Dose modification: 240 mg (original dose 240 mg, Cycle 9)  Administration: 240 mg (11/20/2023), 240 mg (12/4/2023), 240 mg (12/18/2023), 240 mg (1/10/2024), 240 mg (1/23/2024), 240 mg (4/23/2024), 240 mg (5/7/2024), 240 mg (5/22/2024), 240 mg (6/4/2024), 240 mg (6/18/2024), 240 mg (7/1/2024), 240 mg (7/16/2024), 240 mg  (7/30/2024)  leucovorin calcium IVPB, 896 mg, Intravenous, Once, 21 of 23 cycles  Administration: 900 mg (7/31/2023), 900 mg (8/14/2023), 900 mg (8/28/2023), 900 mg (9/11/2023), 900 mg (9/25/2023), 900 mg (10/9/2023), 900 mg (10/23/2023), 900 mg (11/6/2023), 900 mg (11/20/2023), 900 mg (12/4/2023), 900 mg (12/18/2023), 850 mg (1/10/2024), 850 mg (1/23/2024), 800 mg (4/23/2024), 800 mg (5/7/2024), 800 mg (5/22/2024), 800 mg (6/4/2024), 800 mg (6/18/2024), 800 mg (7/1/2024), 800 mg (7/16/2024), 800 mg (7/30/2024)  oxaliplatin (ELOXATIN) chemo infusion, 85 mg/m2 = 190.4 mg, Intravenous, Once, 12 of 12 cycles  Dose modification: 68 mg/m2 (original dose 85 mg/m2, Cycle 11, Reason: Other (Must fill in a comment), Comment: increased fatigue)  Administration: 190.4 mg (7/31/2023), 190.4 mg (8/14/2023), 200 mg (8/28/2023), 200 mg (9/11/2023), 200 mg (9/25/2023), 200 mg (10/9/2023), 200 mg (10/23/2023), 200 mg (11/6/2023), 200 mg (11/20/2023), 190.4 mg (12/4/2023), 150 mg (12/18/2023), 150 mg (1/10/2024)  fluorouracil (ADRUCIL) ambulatory infusion Soln, 1,200 mg/m2/day = 5,375 mg, Intravenous, Over 46 hours, 21 of 23 cycles     9/26/2023 -  Cancer Staged    Staging form: Colon and Rectum, AJCC 8th Edition  - Pathologic: pT3, pN0, cM1 - Signed by Vickie Israel MD on 4/3/2024  Total positive nodes: 0       3/12/2024 Surgery    A. Terminal ileum, appendix, right colon (right hemicolectomy):  - Adenocarcinoma (5.2cm)  - Forty-nine (49) lymph nodes negative for carcinoma (0/49)    - Acellular mucin present in one (1) lymph node   - See staging synoptic (ypT3N0)     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HCC)   7/27/2023 Initial Diagnosis    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HCC)     7/27/2023 -  Cancer Staged    Staging form: Pharynx - Oropharynx, AJCC 8th Edition  - Clinical stage from 7/27/2023: Stage III (cT1, cN3, cM0, p16+) - Signed by Evan Plummer MD on 7/27/2023  Stage prefix: Initial diagnosis        9/17/2024 -  Chemotherapy    alteplase (CATHFLO), 2 mg, Intracatheter, Every 1 Minute as needed, 6 of 6 cycles  Administration: 2 mg (10/21/2024)  CISplatin (PLATINOL) infusion, 70 mg (original dose 40 mg/m2), Intravenous, Once, 6 of 6 cycles  Dose modification: 70 mg (original dose 40 mg/m2, Cycle 1, Reason: Anticipated Tolerance), 30 mg/m2 (original dose 40 mg/m2, Cycle 4, Reason: Neuropathy, Comment: dose reduction to 30 mg/m2 due to neuropathy)  Administration: 70 mg (9/17/2024), 70 mg (9/23/2024), 70 mg (9/30/2024), 59.1 mg (10/7/2024), 59.1 mg (10/14/2024), 59.1 mg (10/21/2024)  sodium chloride, 500 mL, Intravenous, Once, 6 of 6 cycles  Administration: 500 mL (9/17/2024), 500 mL (9/17/2024), 500 mL (9/23/2024), 500 mL (9/23/2024), 500 mL (9/30/2024), 500 mL (9/30/2024), 500 mL (10/7/2024), 500 mL (10/7/2024), 500 mL (10/14/2024), 500 mL (10/14/2024), 500 mL (10/21/2024)  fosaprepitant (EMEND) IVPB, 150 mg, Intravenous, Once, 6 of 6 cycles  Administration: 150 mg (9/17/2024), 150 mg (9/23/2024), 150 mg (9/30/2024), 150 mg (10/7/2024), 150 mg (10/14/2024), 150 mg (10/21/2024)  IVPB builder, , Intravenous, Once, 1 of 1 cycle  Administration: Unknown dose (10/21/2024)       Problem List:  Patient Active Problem List   Diagnosis    Primary hypertension    Mixed hyperlipidemia    Malignant neoplasm of right female breast (HCC)    Vitamin D deficiency    Prediabetes    Right thyroid nodule    GERD (gastroesophageal reflux disease)    Obesity    Metastatic colon cancer to liver (HCC)    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HCC)    Poor appetite    Nutritional anemia    Dehydration    Drug-induced constipation    Chemotherapy induced neutropenia (HCC)    Stage 3a chronic kidney disease (HCC)    Thrombocytopenia (HCC)    Neuropathy    Diastolic dysfunction    Hypokalemia    Leukemoid reaction    GOGO (acute kidney injury) (HCC)    Acute post-operative pain    At risk for constipation    At risk for delirium     Advance care planning    Physical deconditioning    History of CVA (cerebrovascular accident)    Chronic nasal congestion    Elevated LFTs    Vitamin B12 deficiency     Assessment and Plan:  Patient is a 69-year-old female, with a complex Oncologic history, including synchronous de naveed metastatic adenocarcinoma of the mid-ascending colon (Complicated by partial-obstruction requiring right hemicolectomy on March 15th, 2024) with biopsy-proven oligometastatic disease to the liver (Status-post cryoablation on June 3rd, 2024), and unresectable (At least locally-advanced versus metastatic) 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 Of note, following PET-CT on August 5th, 2024 showing interval disease progression in the neck, patient's case was presented at the Multidisciplinary Head and Neck Tumor Board on August 12th, 2024, which culminated in consensus for re-evaluation by Otolaryngology and Radiation Oncology. Patient was recommended definitive chemoradiation. She has since initiated concurrent chemoradiation with weekly Cisplatin, and has completed six-cycles, thus far. Cycle 7 of Cisplatin 30 mg/m2 was discontinued, due to poor-tolerance of concurrent chemoradiation. Patient is tentatively anticipating completion of radiation next Wednesday, November 6th, 2024; however, discussed case with Radiation Oncology. Will tentatively hold radiation this week, pending clinical course. On evaluation this morning, patient appears fatigued, and endorses intractable pain associated with recent tonsillectomy (Performed on October 9th, 2024), further complicated by ongoing concurrent chemoradiation. Given the above-mentioned, discussed direct-admission to [facility], for initiation of supportive-cares, including intravenous-fluid resuscitation and pain-control. Would request Palliative Care consultation, while inpatient, as well.  Patient is amenable to the above-mentioned plan. Will coordinate for close interval follow-up, thereafter; pending hospital-course.    Summary of Recommendations:  Cancelled Cycle 7 of Cisplatin 30 mg/m2, given poor-tolerance of chemoradiation.  Discussed case with Radiation Oncology - Will discontinue radiation this week, pending course.  Recommend direct-admission to Hospitalist service (ADT-21 order placed).  For initiation of supportive-cares via intravenous-fluid, and pain-control.  Please request Palliative Care consultation, while inpatient.  Obtain repeat CT Neck, Chest, Abdomen, and Pelvis following completion of radiation.  Will coordinate for close interval follow-up with Medical Oncology pending hospital-course.    Past Medical History:   has a past medical history of Anemia, Arthritis, Body mass index (BMI) 40.0-44.9, adult (HCC), Breast cancer (HCC) (12/17/2018), Cancer (HCC), Cervical lymphadenopathy, Encounter for screening for HIV (07/07/2022), Follow-up examination (04/04/2023), GERD (gastroesophageal reflux disease), Hyperlipidemia, Hypertension, Obesity, Stroke (HCC), Stroke (HCC), Transaminitis (09/22/2021), and Vasomotor rhinitis.    Past Surgical History:   has a past surgical history that includes US guided breast biopsy right complete (Right, 11/23/2018); Breast surgery; pr mastectomy partial w/axillary lymphadenectomy (Right, 12/20/2018); Tubal ligation; Breast biopsy (Right, 11/23/2018); US guided injection for research study (12/20/2018); US guided injection for research study (12/07/2018); US guided injection for research study (05/03/2019); US guided lymph node biopsy right (05/26/2023); IR biopsy liver mass (06/27/2023); pr laryngoscopy w/biopsy microscope/telescope (N/A, 07/20/2023); pr brncc incl fluor gdnce dx w/cell washg spx (N/A, 07/20/2023); ESOPHAGOSCOPY (N/A, 07/20/2023); IR port placement (07/28/2023); IR port check (01/03/2024); IR port stripping (01/09/2024); Colonoscopy;  Hemicoloectomy w/ anastomosis (Right, 3/12/2024); LAPAROTOMY (N/A, 3/12/2024); IR cryoablation (6/3/2024); IR gastrostomy tube placement (10/2/2024); pr laryngoscopy w/wo tracheoscopy dx except  (N/A, 10/9/2024); pr biopsy nasopharynx visible lesion simple (N/A, 10/9/2024); and pr tonsillectomy primary/secondary age 12/> (N/A, 10/9/2024).    Current Medications:  Current Outpatient Medications   Medication Sig Dispense Refill    acetaminophen (TYLENOL) 160 mg/5 mL liquid Take 20 mL (640 mg total) by mouth every 6 (six) hours as needed for mild pain for up to 10 days 473 mL 0    anastrozole (ARIMIDEX) 1 mg tablet Take 1 tablet (1 mg total) by mouth daily 90 tablet 3    atorvastatin (LIPITOR) 40 mg tablet Take 1 tablet (40 mg total) by mouth daily at bedtime 30 tablet 2    diphenhydramine, lidocaine, Al/Mg hydroxide, simethicone (Magic Mouthwash) SUSP Swish and swallow 10 mL every 4 (four) hours as needed for mouth pain or discomfort 480 mL 1    docusate sodium (COLACE) 50 mg capsule Take 1 capsule (50 mg total) by mouth 2 (two) times a day 90 capsule 1    ergocalciferol (VITAMIN D2) 50,000 units Take 1 capsule (50,000 Units total) by mouth once a week 90 capsule 3    ferrous sulfate 325 (65 Fe) mg tablet Take 1 tablet (325 mg total) by mouth daily with breakfast 30 tablet 0    folic acid (FOLVITE) 1 mg tablet Take 1 tablet (1 mg total) by mouth in the morning 90 tablet 3    gabapentin (NEURONTIN) 300 mg capsule Take 1 pills in the morning, 1 pill at lunch and 2 pills before bed 120 capsule 3    hydrocortisone 1 % ointment       ibuprofen (MOTRIN) 100 mg/5 mL suspension Take 20 mL (400 mg total) by mouth every 6 (six) hours as needed for moderate pain 473 mL 0    lidocaine-prilocaine (EMLA) cream Apply topically as needed for mild pain 30 g 3    losartan (COZAAR) 50 mg tablet Take 1 tablet (50 mg total) by mouth daily 90 tablet 5    mirtazapine (REMERON) 15 mg tablet Take 1 tablet (15 mg total) by mouth daily  at bedtime Patient is also on a 30 mg tablet, for a total dose of 45 mg 30 tablet 3    mirtazapine (REMERON) 30 mg tablet Take 1 tablet (30 mg total) by mouth daily at bedtime 30 tablet 3    omeprazole (PriLOSEC) 20 mg delayed release capsule Take 1 capsule (20 mg total) by mouth daily 30 capsule 5    ondansetron (ZOFRAN) 8 mg tablet Take 1 tablet (8 mg total) by mouth every 8 (eight) hours as needed for nausea or vomiting 90 tablet 2    oxyCODONE (ROXICODONE) 5 mg/5 mL solution Take 5 mL (5 mg total) by mouth every 6 (six) hours as needed for severe pain ((breakthrough pain)) Max Daily Amount: 20 mg 473 mL 0    potassium chloride (Klor-Con M20) 20 mEq tablet Take 1 tablet (20 mEq total) by mouth 2 (two) times a day 90 tablet 1    prochlorperazine (COMPAZINE) 10 mg tablet Take 1 tablet (10 mg total) by mouth every 6 (six) hours as needed for nausea or vomiting 90 tablet 2    pyridoxine (VITAMIN B6) 50 mg tablet Take 1 tablet (50 mg total) by mouth daily 90 tablet 3    vitamin B-12 (VITAMIN B-12) 1,000 mcg tablet        No current facility-administered medications for this visit.     Social History:   reports that she has never smoked. She has never been exposed to tobacco smoke. She has never used smokeless tobacco. She reports that she does not drink alcohol and does not use drugs.     Family History:  family history includes Colon cancer (age of onset: 58) in her mother; Hypertension in her daughter, mother, and son; Pancreatic cancer (age of onset: 60) in her father.     Allergies:  has No Known Allergies.    Objective:  Vitals:    10/29/24 1026   BP: 154/90   Pulse: (!) 108   Resp: 18   Temp: 97.7 °F (36.5 °C)   SpO2: 97%     Physical Exam:  General: Alert, and oriented; Pleasant; Appears Uncomfortable with Minimal Speech  HEENT: Atraumatic, and normocephalic; PERRLA; EOMI; Moist mucosal membranes; Marked Pharyngeal Erythema Without Obvious Exudates  Cardiovascular: Tachycardia (Regular); No murmurs, rubs, or  gallops; No edema  Respiratory: Clear to auscultation bilaterally; Adequate aeration; No supplemental oxygen   Abdomen: Soft, Non-tender; Non-distended; No organomegaly; Bowel sounds present  Extremities: No obvious deformities; No peripheral edema; Moves all  Neurologic: Appropriately alert, and oriented to Person, Place, and Time; No focal neurologic deficits    Labs:  Lab Results   Component Value Date    WBC 1.58 (L) 10/28/2024    HGB 7.0 (L) 10/28/2024    HCT 21.7 (L) 10/28/2024    MCV 86 10/28/2024     10/28/2024     Lab Results   Component Value Date    SODIUM 142 10/28/2024    K 3.6 10/28/2024     10/28/2024    CO2 31 10/28/2024    AGAP 7 10/28/2024    BUN 31 (H) 10/28/2024    CREATININE 1.10 10/28/2024    GLUC 119 10/28/2024    GLUF 95 09/13/2024    CALCIUM 8.6 10/28/2024    AST 28 10/28/2024    ALT 26 10/28/2024    ALKPHOS 91 10/28/2024    TP 7.3 10/28/2024    TBILI 0.23 10/28/2024    EGFR 51 10/28/2024     Patient was seen and discussed with attending physician, Harika Medina D.O.    Carole Newman D.O.  Hematology-Oncology Fellow (PGY-5)

## 2024-10-28 NOTE — PROGRESS NOTES
Patient tolerated hydration with electrolytes without incident. Patient declined AVS but she is aware of appointment on 10/30/24 at 10:30am.

## 2024-10-29 ENCOUNTER — APPOINTMENT (EMERGENCY)
Dept: RADIOLOGY | Facility: HOSPITAL | Age: 69
DRG: 947 | End: 2024-10-29
Payer: MEDICARE

## 2024-10-29 ENCOUNTER — HOSPITAL ENCOUNTER (INPATIENT)
Facility: HOSPITAL | Age: 69
LOS: 5 days | Discharge: HOME | DRG: 947 | End: 2024-11-04
Attending: EMERGENCY MEDICINE | Admitting: FAMILY MEDICINE
Payer: MEDICARE

## 2024-10-29 ENCOUNTER — APPOINTMENT (OUTPATIENT)
Dept: RADIATION ONCOLOGY | Facility: CLINIC | Age: 69
End: 2024-10-29
Attending: RADIOLOGY
Payer: MEDICARE

## 2024-10-29 ENCOUNTER — OFFICE VISIT (OUTPATIENT)
Dept: HEMATOLOGY ONCOLOGY | Facility: CLINIC | Age: 69
End: 2024-10-29
Payer: MEDICARE

## 2024-10-29 ENCOUNTER — APPOINTMENT (OUTPATIENT)
Dept: RADIOLOGY | Facility: HOSPITAL | Age: 69
DRG: 947 | End: 2024-10-29
Payer: MEDICARE

## 2024-10-29 ENCOUNTER — TELEPHONE (OUTPATIENT)
Dept: HEMATOLOGY ONCOLOGY | Facility: CLINIC | Age: 69
End: 2024-10-29

## 2024-10-29 VITALS
SYSTOLIC BLOOD PRESSURE: 154 MMHG | BODY MASS INDEX: 30.53 KG/M2 | DIASTOLIC BLOOD PRESSURE: 90 MMHG | OXYGEN SATURATION: 97 % | WEIGHT: 190 LBS | HEART RATE: 108 BPM | HEIGHT: 66 IN | TEMPERATURE: 97.7 F | RESPIRATION RATE: 18 BRPM

## 2024-10-29 DIAGNOSIS — T45.1X5A CHEMOTHERAPY INDUCED NEUTROPENIA (HCC): ICD-10-CM

## 2024-10-29 DIAGNOSIS — R53.0 NEOPLASTIC MALIGNANT RELATED FATIGUE: ICD-10-CM

## 2024-10-29 DIAGNOSIS — R53.83 FATIGUE, UNSPECIFIED TYPE: ICD-10-CM

## 2024-10-29 DIAGNOSIS — Z78.9 ON TUBE FEEDING DIET: ICD-10-CM

## 2024-10-29 DIAGNOSIS — C09.9 RIGHT TONSILLAR SQUAMOUS CELL CARCINOMA (HCC): ICD-10-CM

## 2024-10-29 DIAGNOSIS — R09.89 SENSATION, CHOKING: ICD-10-CM

## 2024-10-29 DIAGNOSIS — Z93.1 S/P PERCUTANEOUS ENDOSCOPIC GASTROSTOMY (PEG) TUBE PLACEMENT (HCC): ICD-10-CM

## 2024-10-29 DIAGNOSIS — D70.1 CHEMOTHERAPY INDUCED NEUTROPENIA (HCC): ICD-10-CM

## 2024-10-29 DIAGNOSIS — G89.3 CANCER RELATED PAIN: ICD-10-CM

## 2024-10-29 DIAGNOSIS — G89.3 CANCER ASSOCIATED PAIN: Primary | ICD-10-CM

## 2024-10-29 DIAGNOSIS — C09.9 RIGHT TONSILLAR SQUAMOUS CELL CARCINOMA (HCC): Primary | ICD-10-CM

## 2024-10-29 DIAGNOSIS — C78.7 METASTATIC COLON CANCER TO LIVER (HCC): ICD-10-CM

## 2024-10-29 DIAGNOSIS — I10 PRIMARY HYPERTENSION: ICD-10-CM

## 2024-10-29 DIAGNOSIS — C18.9 METASTATIC COLON CANCER TO LIVER (HCC): ICD-10-CM

## 2024-10-29 DIAGNOSIS — E86.0 DEHYDRATION: ICD-10-CM

## 2024-10-29 LAB
ALBUMIN SERPL BCG-MCNC: 3.2 G/DL (ref 3.5–5)
ALP SERPL-CCNC: 105 U/L (ref 34–104)
ALT SERPL W P-5'-P-CCNC: 22 U/L (ref 7–52)
ANION GAP SERPL CALCULATED.3IONS-SCNC: 7 MMOL/L (ref 4–13)
AST SERPL W P-5'-P-CCNC: 21 U/L (ref 13–39)
ATRIAL RATE: 101 BPM
BASOPHILS # BLD AUTO: 0.01 THOUSANDS/ΜL (ref 0–0.1)
BASOPHILS NFR BLD AUTO: 1 % (ref 0–1)
BILIRUB SERPL-MCNC: 0.33 MG/DL (ref 0.2–1)
BUN SERPL-MCNC: 32 MG/DL (ref 5–25)
CALCIUM ALBUM COR SERPL-MCNC: 9.5 MG/DL (ref 8.3–10.1)
CALCIUM SERPL-MCNC: 8.9 MG/DL (ref 8.4–10.2)
CHLORIDE SERPL-SCNC: 105 MMOL/L (ref 96–108)
CO2 SERPL-SCNC: 31 MMOL/L (ref 21–32)
CREAT SERPL-MCNC: 1 MG/DL (ref 0.6–1.3)
EOSINOPHIL # BLD AUTO: 0.02 THOUSAND/ΜL (ref 0–0.61)
EOSINOPHIL NFR BLD AUTO: 1 % (ref 0–6)
ERYTHROCYTE [DISTWIDTH] IN BLOOD BY AUTOMATED COUNT: 17.6 % (ref 11.6–15.1)
FLUAV RNA RESP QL NAA+PROBE: NEGATIVE
FLUBV RNA RESP QL NAA+PROBE: NEGATIVE
GFR SERPL CREATININE-BSD FRML MDRD: 58 ML/MIN/1.73SQ M
GLUCOSE SERPL-MCNC: 102 MG/DL (ref 65–140)
HCT VFR BLD AUTO: 23.1 % (ref 34.8–46.1)
HGB BLD-MCNC: 7.6 G/DL (ref 11.5–15.4)
IMM GRANULOCYTES # BLD AUTO: 0 THOUSAND/UL (ref 0–0.2)
IMM GRANULOCYTES NFR BLD AUTO: 0 % (ref 0–2)
LACTATE SERPL-SCNC: 1.3 MMOL/L (ref 0.5–2)
LYMPHOCYTES # BLD AUTO: 0.19 THOUSANDS/ΜL (ref 0.6–4.47)
LYMPHOCYTES NFR BLD AUTO: 12 % (ref 14–44)
MCH RBC QN AUTO: 28.8 PG (ref 26.8–34.3)
MCHC RBC AUTO-ENTMCNC: 32.9 G/DL (ref 31.4–37.4)
MCV RBC AUTO: 88 FL (ref 82–98)
MONOCYTES # BLD AUTO: 0.24 THOUSAND/ΜL (ref 0.17–1.22)
MONOCYTES NFR BLD AUTO: 16 % (ref 4–12)
NEUTROPHILS # BLD AUTO: 1.07 THOUSANDS/ΜL (ref 1.85–7.62)
NEUTS SEG NFR BLD AUTO: 70 % (ref 43–75)
NRBC BLD AUTO-RTO: 0 /100 WBCS
P AXIS: 74 DEGREES
PLATELET # BLD AUTO: 133 THOUSANDS/UL (ref 149–390)
PLATELET # BLD AUTO: 147 THOUSANDS/UL (ref 149–390)
PMV BLD AUTO: 9.9 FL (ref 8.9–12.7)
PMV BLD AUTO: 9.9 FL (ref 8.9–12.7)
POTASSIUM SERPL-SCNC: 4 MMOL/L (ref 3.5–5.3)
PR INTERVAL: 138 MS
PREALB SERPL-MCNC: 14.9 MG/DL (ref 17–34)
PROCALCITONIN SERPL-MCNC: 0.14 NG/ML
PROT SERPL-MCNC: 6.9 G/DL (ref 6.4–8.4)
QRS AXIS: 67 DEGREES
QRSD INTERVAL: 66 MS
QT INTERVAL: 314 MS
QTC INTERVAL: 407 MS
RBC # BLD AUTO: 2.64 MILLION/UL (ref 3.81–5.12)
RSV RNA RESP QL NAA+PROBE: NEGATIVE
SARS-COV-2 RNA RESP QL NAA+PROBE: NEGATIVE
SODIUM SERPL-SCNC: 143 MMOL/L (ref 135–147)
T WAVE AXIS: 46 DEGREES
VENTRICULAR RATE: 101 BPM
WBC # BLD AUTO: 1.53 THOUSAND/UL (ref 4.31–10.16)

## 2024-10-29 PROCEDURE — 84145 PROCALCITONIN (PCT): CPT

## 2024-10-29 PROCEDURE — 93005 ELECTROCARDIOGRAM TRACING: CPT

## 2024-10-29 PROCEDURE — 83605 ASSAY OF LACTIC ACID: CPT

## 2024-10-29 PROCEDURE — 93010 ELECTROCARDIOGRAM REPORT: CPT | Performed by: INTERNAL MEDICINE

## 2024-10-29 PROCEDURE — 96374 THER/PROPH/DIAG INJ IV PUSH: CPT

## 2024-10-29 PROCEDURE — 99285 EMERGENCY DEPT VISIT HI MDM: CPT | Performed by: EMERGENCY MEDICINE

## 2024-10-29 PROCEDURE — G2211 COMPLEX E/M VISIT ADD ON: HCPCS | Performed by: INTERNAL MEDICINE

## 2024-10-29 PROCEDURE — 85025 COMPLETE CBC W/AUTO DIFF WBC: CPT

## 2024-10-29 PROCEDURE — 87040 BLOOD CULTURE FOR BACTERIA: CPT

## 2024-10-29 PROCEDURE — 99214 OFFICE O/P EST MOD 30 MIN: CPT | Performed by: INTERNAL MEDICINE

## 2024-10-29 PROCEDURE — 70491 CT SOFT TISSUE NECK W/DYE: CPT

## 2024-10-29 PROCEDURE — 84134 ASSAY OF PREALBUMIN: CPT

## 2024-10-29 PROCEDURE — 99223 1ST HOSP IP/OBS HIGH 75: CPT | Performed by: FAMILY MEDICINE

## 2024-10-29 PROCEDURE — 0241U HB NFCT DS VIR RESP RNA 4 TRGT: CPT

## 2024-10-29 PROCEDURE — 99284 EMERGENCY DEPT VISIT MOD MDM: CPT

## 2024-10-29 PROCEDURE — 85049 AUTOMATED PLATELET COUNT: CPT

## 2024-10-29 PROCEDURE — 36415 COLL VENOUS BLD VENIPUNCTURE: CPT

## 2024-10-29 PROCEDURE — 80053 COMPREHEN METABOLIC PANEL: CPT

## 2024-10-29 RX ORDER — HYDROMORPHONE HCL/PF 1 MG/ML
0.5 SYRINGE (ML) INJECTION EVERY 2 HOUR PRN
Status: DISCONTINUED | OUTPATIENT
Start: 2024-10-29 | End: 2024-10-30

## 2024-10-29 RX ORDER — SODIUM CHLORIDE 9 MG/ML
125 INJECTION, SOLUTION INTRAVENOUS CONTINUOUS
Status: DISCONTINUED | OUTPATIENT
Start: 2024-10-29 | End: 2024-10-30

## 2024-10-29 RX ORDER — OXYCODONE HCL 5 MG/5 ML
5 SOLUTION, ORAL ORAL EVERY 6 HOURS PRN
Status: DISCONTINUED | OUTPATIENT
Start: 2024-10-29 | End: 2024-10-29

## 2024-10-29 RX ORDER — POTASSIUM CHLORIDE 1500 MG/1
20 TABLET, EXTENDED RELEASE ORAL 2 TIMES DAILY
Status: DISCONTINUED | OUTPATIENT
Start: 2024-10-29 | End: 2024-10-29

## 2024-10-29 RX ORDER — LOSARTAN POTASSIUM 50 MG/1
50 TABLET ORAL DAILY
Status: DISCONTINUED | OUTPATIENT
Start: 2024-10-30 | End: 2024-11-04 | Stop reason: HOSPADM

## 2024-10-29 RX ORDER — ANASTROZOLE 1 MG/1
1 TABLET ORAL DAILY
Status: DISCONTINUED | OUTPATIENT
Start: 2024-10-30 | End: 2024-11-04 | Stop reason: HOSPADM

## 2024-10-29 RX ORDER — FOLIC ACID 1 MG/1
1 TABLET ORAL DAILY
Status: DISCONTINUED | OUTPATIENT
Start: 2024-10-29 | End: 2024-10-29

## 2024-10-29 RX ORDER — ONDANSETRON HYDROCHLORIDE 4 MG/5ML
4 SOLUTION ORAL EVERY 4 HOURS PRN
Status: DISCONTINUED | OUTPATIENT
Start: 2024-10-29 | End: 2024-11-04 | Stop reason: HOSPADM

## 2024-10-29 RX ORDER — POTASSIUM CHLORIDE 20MEQ/15ML
20 LIQUID (ML) ORAL 2 TIMES DAILY
Status: DISCONTINUED | OUTPATIENT
Start: 2024-10-29 | End: 2024-10-29

## 2024-10-29 RX ORDER — POLYETHYLENE GLYCOL 3350 17 G/17G
17 POWDER, FOR SOLUTION ORAL DAILY
Status: DISCONTINUED | OUTPATIENT
Start: 2024-10-30 | End: 2024-11-04 | Stop reason: HOSPADM

## 2024-10-29 RX ORDER — ANASTROZOLE 1 MG/1
1 TABLET ORAL DAILY
Status: DISCONTINUED | OUTPATIENT
Start: 2024-10-30 | End: 2024-10-29

## 2024-10-29 RX ORDER — ERGOCALCIFEROL 1.25 MG/1
50000 CAPSULE, LIQUID FILLED ORAL WEEKLY
Status: DISCONTINUED | OUTPATIENT
Start: 2024-10-29 | End: 2024-11-04 | Stop reason: HOSPADM

## 2024-10-29 RX ORDER — DIPHENHYDRAMINE HYDROCHLORIDE AND LIDOCAINE HYDROCHLORIDE AND ALUMINUM HYDROXIDE AND MAGNESIUM HYDRO
10 KIT EVERY 4 HOURS PRN
Status: DISCONTINUED | OUTPATIENT
Start: 2024-10-29 | End: 2024-11-04 | Stop reason: HOSPADM

## 2024-10-29 RX ORDER — POLYETHYLENE GLYCOL 3350 17 G/17G
17 POWDER, FOR SOLUTION ORAL DAILY
Status: DISCONTINUED | OUTPATIENT
Start: 2024-10-30 | End: 2024-10-29

## 2024-10-29 RX ORDER — GABAPENTIN 250 MG/5ML
300 SOLUTION ORAL DAILY
Status: DISCONTINUED | OUTPATIENT
Start: 2024-10-30 | End: 2024-11-04 | Stop reason: HOSPADM

## 2024-10-29 RX ORDER — PANTOPRAZOLE SODIUM 40 MG/10ML
40 INJECTION, POWDER, LYOPHILIZED, FOR SOLUTION INTRAVENOUS
Status: DISCONTINUED | OUTPATIENT
Start: 2024-10-30 | End: 2024-11-04 | Stop reason: HOSPADM

## 2024-10-29 RX ORDER — GABAPENTIN 250 MG/5ML
600 SOLUTION ORAL
Status: DISCONTINUED | OUTPATIENT
Start: 2024-10-29 | End: 2024-11-04 | Stop reason: HOSPADM

## 2024-10-29 RX ORDER — GABAPENTIN 300 MG/1
300 CAPSULE ORAL DAILY
Status: DISCONTINUED | OUTPATIENT
Start: 2024-10-30 | End: 2024-10-29

## 2024-10-29 RX ORDER — POTASSIUM CHLORIDE 20MEQ/15ML
20 LIQUID (ML) ORAL 2 TIMES DAILY
Status: DISCONTINUED | OUTPATIENT
Start: 2024-10-30 | End: 2024-11-04 | Stop reason: HOSPADM

## 2024-10-29 RX ORDER — PANTOPRAZOLE SODIUM 40 MG/1
40 TABLET, DELAYED RELEASE ORAL
Status: DISCONTINUED | OUTPATIENT
Start: 2024-10-30 | End: 2024-10-29

## 2024-10-29 RX ORDER — GABAPENTIN 300 MG/1
600 CAPSULE ORAL
Status: DISCONTINUED | OUTPATIENT
Start: 2024-10-29 | End: 2024-10-29

## 2024-10-29 RX ORDER — FOLIC ACID 1 MG/1
1 TABLET ORAL DAILY
Status: DISCONTINUED | OUTPATIENT
Start: 2024-10-30 | End: 2024-11-04 | Stop reason: HOSPADM

## 2024-10-29 RX ORDER — LOSARTAN POTASSIUM 50 MG/1
50 TABLET ORAL DAILY
Status: DISCONTINUED | OUTPATIENT
Start: 2024-10-30 | End: 2024-10-29

## 2024-10-29 RX ORDER — PYRIDOXINE HCL (VITAMIN B6) 50 MG
50 TABLET ORAL DAILY
Status: DISCONTINUED | OUTPATIENT
Start: 2024-10-30 | End: 2024-10-29

## 2024-10-29 RX ORDER — ATORVASTATIN CALCIUM 40 MG/1
40 TABLET, FILM COATED ORAL
Status: DISCONTINUED | OUTPATIENT
Start: 2024-10-29 | End: 2024-11-04 | Stop reason: HOSPADM

## 2024-10-29 RX ORDER — PYRIDOXINE HCL (VITAMIN B6) 50 MG
50 TABLET ORAL DAILY
Status: DISCONTINUED | OUTPATIENT
Start: 2024-10-30 | End: 2024-11-04 | Stop reason: HOSPADM

## 2024-10-29 RX ORDER — OXYCODONE HCL 5 MG/5 ML
5 SOLUTION, ORAL ORAL EVERY 6 HOURS PRN
Status: DISCONTINUED | OUTPATIENT
Start: 2024-10-29 | End: 2024-10-30

## 2024-10-29 RX ORDER — PROCHLORPERAZINE MALEATE 10 MG
10 TABLET ORAL EVERY 6 HOURS PRN
Status: DISCONTINUED | OUTPATIENT
Start: 2024-10-29 | End: 2024-10-29

## 2024-10-29 RX ORDER — ATORVASTATIN CALCIUM 40 MG/1
40 TABLET, FILM COATED ORAL
Status: DISCONTINUED | OUTPATIENT
Start: 2024-10-29 | End: 2024-10-29

## 2024-10-29 RX ORDER — SODIUM CHLORIDE 9 MG/ML
100 INJECTION, SOLUTION INTRAVENOUS CONTINUOUS
Status: DISCONTINUED | OUTPATIENT
Start: 2024-10-29 | End: 2024-10-29

## 2024-10-29 RX ORDER — HYDROMORPHONE HCL/PF 1 MG/ML
1 SYRINGE (ML) INJECTION ONCE
Status: COMPLETED | OUTPATIENT
Start: 2024-10-29 | End: 2024-10-29

## 2024-10-29 RX ORDER — ACETAMINOPHEN 160 MG/5ML
325 SUSPENSION ORAL EVERY 6 HOURS PRN
Status: DISCONTINUED | OUTPATIENT
Start: 2024-10-29 | End: 2024-10-30

## 2024-10-29 RX ORDER — ENOXAPARIN SODIUM 100 MG/ML
40 INJECTION SUBCUTANEOUS DAILY
Status: DISCONTINUED | OUTPATIENT
Start: 2024-10-30 | End: 2024-11-04 | Stop reason: HOSPADM

## 2024-10-29 RX ORDER — ONDANSETRON HYDROCHLORIDE 4 MG/5ML
4 SOLUTION ORAL EVERY 4 HOURS PRN
Status: DISCONTINUED | OUTPATIENT
Start: 2024-10-29 | End: 2024-10-29

## 2024-10-29 RX ADMIN — SODIUM CHLORIDE 125 ML/HR: 0.9 INJECTION, SOLUTION INTRAVENOUS at 23:28

## 2024-10-29 RX ADMIN — HYDROMORPHONE HYDROCHLORIDE 0.5 MG: 1 INJECTION, SOLUTION INTRAMUSCULAR; INTRAVENOUS; SUBCUTANEOUS at 22:12

## 2024-10-29 RX ADMIN — OXYCODONE HYDROCHLORIDE 5 MG: 5 SOLUTION ORAL at 19:43

## 2024-10-29 RX ADMIN — DOCUSATE SODIUM LIQUID 50 MG: 50 LIQUID ORAL at 22:10

## 2024-10-29 RX ADMIN — HYDROMORPHONE HYDROCHLORIDE 1 MG: 1 INJECTION, SOLUTION INTRAMUSCULAR; INTRAVENOUS; SUBCUTANEOUS at 13:45

## 2024-10-29 RX ADMIN — PIPERACILLIN SODIUM AND TAZOBACTAM SODIUM 4.5 G: 36; 4.5 INJECTION, POWDER, LYOPHILIZED, FOR SOLUTION INTRAVENOUS at 23:33

## 2024-10-29 RX ADMIN — IOHEXOL 85 ML: 350 INJECTION, SOLUTION INTRAVENOUS at 14:51

## 2024-10-29 RX ADMIN — MIRTAZAPINE 45 MG: 30 TABLET, FILM COATED ORAL at 22:02

## 2024-10-29 RX ADMIN — GABAPENTIN 600 MG: 250 SOLUTION ORAL at 22:03

## 2024-10-29 RX ADMIN — SODIUM CHLORIDE 100 ML/HR: 0.9 INJECTION, SOLUTION INTRAVENOUS at 20:31

## 2024-10-29 RX ADMIN — SODIUM CHLORIDE 1000 ML: 0.9 INJECTION, SOLUTION INTRAVENOUS at 22:03

## 2024-10-29 RX ADMIN — ATORVASTATIN CALCIUM 40 MG: 40 TABLET, FILM COATED ORAL at 22:03

## 2024-10-29 NOTE — ASSESSMENT & PLAN NOTE
"-Briefly, right tonsillar SCC first diagnosed in 7/2023  -Of note, interval PET/CT done in 8/2024 showed interval disease progression.  Consensus of follow-up tumor board was for patient to have reevaluation by ENT, radiation oncology, as well as definitive chemoradiation  -Last few weeks completed 6 cycles of cisplatin; 7 cycles stopped in light of recent fatigue/choking sensation worsening  -10/2/2024: G-tube placement  -10/9/2024: Right tonsillectomy  -10/29/2024: CT neck soft tissue without contrast: Posttreatment changes as described without evidence for disease progression compared to PET/CT from 8/5/2024. No acute abnormality.     Plan:  -See A&P under \"neoplastic malignant related fatigue\" and \"sensation choking\"  -Consults: Heme-onc, ENT, palliative care, nutrition     - ENT with no further intervention  -Aspiration and airway precautions  -All feeds and meds through G-tube - will attempt to progress diet per patient's request  "

## 2024-10-29 NOTE — H&P
[facility]  History and Physical - Family Medicine Residency  Maira Moura 1955, 69 y.o. female.  MRN: [id number]  Unit/Bed#: ED 24 Encounter: [id number]  Primary Care Provider: Fanta Turner MD  Admission Date: 10/29/2024 1204  Code Status:  Level 1 - Full Code      Assessment/Plan:      Plans discussed with [facility] team and finalization is pending attending physician attestation. Please, call [phone] for any clarification.     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 Increased use of oxycodone 5 mg Q6h through PEG tube, c/o choking sensation progressively worsening over past few weeks  - Recommended for inpatient admission for further work up   - Home meds: anastrozole 1 mg daily, oxy 5 mg Q6PRN    Plan:  Sepsis work up  CXR, UA  IVF  Prealbumin  Fall precautions  H/O, Palliative, ENT consulted  Monitor vitals  Monitor CBC, CMP, Mg, Phos, Nutrition     Sensation, choking  Assessment & Plan  - C/o progressively worsening choking sensation for past few weeks, mainly when patient lying down or asleep  - Has not been able to take anything by mouth except small sips of water  - Hx of R tonsillar SCC   - Recently started     Plan:  H/O consult  Speech consult   Consider ENT  NPO, use PEG tube for medications and food at this  time  Nutrition consult   Aspiration precautions  Airway precautions     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HCC)  Assessment & Plan  - Hx of Invasive ductal carcinoma status postmastectomy in 2018, metastatic adenocarcinoma of the colon status post right hemicolectomy on March 2024, has metastatic disease to the liver status post cryoablation on June 2024, unresectable squamous cell carcinoma of the right tonsil  -Recent ENT evaluation 10/24/24: Patient with history of colon cancer in addition to right tonsil p16+ SCC T1N3M0 on initial staging. She was presented at Multidisciplinary Head and Neck Tumor Board with a consensus for re-evaluation by Otolaryngology for DL with biopsy of left tonsil and Radiation Oncology to consider XRT.   PET/CT 08/05/24:  IMPRESSION:  1. Findings suspicious for interval progression of hypermetabolic malignancy/metastatic disease involving the neck with increased FDG activity associated with essentially stable in size bilateral upper cervical lymphadenopathy.  -In addition, note is made of increasing hypermetabolic soft tissue involving the midline of the prevertebral cervical soft tissues at the level of the nasopharynx; findings could reflect inflammatory activity, correlation with direct visualization and possibly tissue sampling is recommended to exclude developing hypermetabolic malignancy in this location.  -Additional note is made of increasing asymmetric nodular activity in the left tonsillar region; while findings could reflect inflammatory activity, given history of right tonsillar carcinoma, correlation is recommended to exclude developing left tonsillar carcinoma.  2. Interval progression of hypermetabolic hepatic metastatic disease, presumably related to history of colon cancer. Findings can be further characterized with contrast-enhanced MRI/CT as clinically indicated.  - 10/29/24: CT neck with contrast showing no acute abnormalities, posttreatment changes of lymph  nodes that were prior noted    Plan:  H/O consult, Palliative care consult   ENT consult  IV fluids, pain control  Continue anastrozole 1 mg  Trend BMP, CBC  Monitor vitals  NPO, feeds and medications through PEG tube  Swallow eval  Aspiration and airway precautions     Malignant neoplasm of right female breast (HCC)  Assessment & Plan  - On home anastrozole 1 mg daily   - see R tonsillar squamous cell cancer    S/P percutaneous endoscopic gastrostomy (PEG) tube placement (HCC)  Assessment & Plan  - Placed on 10/2/24  - Per notes:  ·TF Goal: 2 containers (474 mL) 3 times daily of Jevity 1.5  ·Water Flushin mL free water flush pre/post each bolus (360 mL water) + additional 150 mL water flushes 4 times daily (600 mL water).   ·TF provides: 1422 mL total volume (6 cartons), 2133 kcal, 91g protein, 1081 mL free water, 2041 mL total fluid      Stage 3a chronic kidney disease (HCC)  Assessment & Plan  Lab Results   Component Value Date    EGFR 58 10/29/2024    EGFR 51 10/28/2024    EGFR 47 10/25/2024    CREATININE 1.00 10/29/2024    CREATININE 1.10 10/28/2024    CREATININE 1.19 10/25/2024     - Maintain adequate hydration  - Avoid nephrotoxic medications    GERD (gastroesophageal reflux disease)  Assessment & Plan  - Home medication: pantoprazole 40 mg daily     Continue home medication    Mixed hyperlipidemia  Assessment & Plan  Home meds: Lipitor 40 mg    Continue home med        Primary hypertension  Assessment & Plan  Home meds: Losartan 50 mg  Systolic (24hrs), Av , Min:154 , Max:171   Diastolic (24hrs), Av, Min:83, Max:90    Uncontrolled, currently stressed  Continue with home meds  Adjust as needed      POLST:    Diet: Diet Enteral/Parenteral; Tube Feeding No Oral Diet; Jevity 1.5; Bolus; 474; (3x/day) - 8:00AM,12:00PM,5:00PM; 60; Before and After each Bolus  VTE Pharm PPX: Enoxaparin (Lovenox)  VTE Mech PPX: sequential compression device  Disposition: This patient requires inpatient level care.      History of Present Illness      HPI:  Maira Moura is a 69 y.o. female who presents with PMH of invasive ductal carcinoma status post mastectomy 2018, metastatic adenocarcinoma of the colon status post right hemicolectomy on March 2024, metastatic disease to the liver status post cryoablation on June 2024, unresectable squamous cell carcinoma of the right tonsil on PEG tube feeds, chemotherapy, radiation, on daily anastrozole presented to the ED directly from outpatient heme-onc visit for ill appearance, uncontrolled pain, increased fatigue and choking sensations.   Previously, patient was on cisplatin for 6 weeks with concurrent radiation however the 7th week was omitted due to concerns for increased toxicity with increased outpatient infusions for fluids and electrolyte replacement. PET/CT scan (8/5/24) showed concerns for suspicious progression of her malignancy; ENT held a Multidisciplinary Head and Neck Tumor Board with a consensus for re-evaluation by Otolaryngology for DL with biopsy of left tonsil and Radiation Oncology to consider XRT.   Due to concern for possible infection, increased toxicity or progression of her condition, patient was recommended for direct admission to [facility] for further evaluation.     ED Management:   CBC showed concern for worsening leukopenia, anemia and thrombocytopenia.   CT soft tissue neck showed post radiation changes with induration in the subcutaneous fat and effacement of the oropharyngeal and supraglottic laryngeal fat planes. No evidence for recurrent or residual right tonsillar mass. ll-defined treated right level 2 claudio metastasis (series 2 image 43) measuring 1.8 x 1.8 cm. On the initial exam from 3/30/2023, this measured 3.7 x 2.2 cm, and is difficult to measure on recent PET/CT. 1.3 x 0.7 cm right level 3 claudio   metastasis (series 2 image 49) grossly stable compared to recent PET/CT. No new adenopathy. Scattered subcentimeter shotty left cervical  lymph nodes are similar to prior exam.    Review of Systems   Constitutional:  Positive for chills and fatigue. Negative for fever.   HENT:  Positive for congestion and trouble swallowing. Negative for ear pain and sore throat.    Eyes:  Negative for pain and visual disturbance.   Respiratory:  Positive for choking. Negative for cough, shortness of breath and wheezing.    Cardiovascular:  Negative for chest pain, palpitations and leg swelling.   Gastrointestinal:  Negative for abdominal pain, blood in stool, constipation, diarrhea, nausea and vomiting.   Genitourinary:  Negative for decreased urine volume, difficulty urinating, dysuria, frequency and hematuria.   Musculoskeletal:  Negative for arthralgias and back pain.   Skin:  Negative for color change and rash.   Neurological:  Positive for speech difficulty. Negative for seizures and syncope.   Psychiatric/Behavioral:  Positive for sleep disturbance. The patient is nervous/anxious.    All other systems reviewed and are negative.         Historical Information   Past Medical History:   Diagnosis Date    Anemia     Arthritis     Body mass index (BMI) 40.0-44.9, adult (HCC)     Breast cancer (HCC) 12/17/2018    Cancer (HCC)     right breast, colon, liver, right tonsil    Cervical lymphadenopathy     CT neck on 3/30/2023- Large right level 2A lymphadenopathy as described above and suspicious for neoplasm.  Correlation with histopathology is recommended.  Mild asymmetric prominence of the right palatine tonsil with otherwise no definitive nodular enhancing lesions identified along the course of the aerodigestive tract.     5/26/23- FNA of this node was nonrevealing for tissue etiology, but it d    Encounter for screening for HIV 07/07/2022    Follow-up examination 04/04/2023    GERD (gastroesophageal reflux disease)     Hyperlipidemia     Hypertension     Obesity     Stroke (HCC)     TIA     Stroke (HCC)     TIA     Transaminitis 09/22/2021    Vasomotor rhinitis      Refilled flonase today. Stopped taking since 2022     Past Surgical History:   Procedure Laterality Date    BREAST BIOPSY Right 2018    us guided bx cancer    BREAST SURGERY      COLONOSCOPY      ESOPHAGOSCOPY N/A 2023    Procedure: ESOPHAGOSCOPY;  Surgeon: David Chapa MD;  [facility];  Service: ENT    HEMICOLOECTOMY W/ ANASTOMOSIS Right 3/12/2024    Procedure: RIGHT COLECTOMY WITH ROBOTICS;  Surgeon: Vickie Israel MD;  Location: [facility];  Service: Surgical Oncology    IR BIOPSY LIVER MASS  2023    IR CRYOABLATION  6/3/2024    IR GASTROSTOMY TUBE PLACEMENT  10/2/2024    IR PORT CHECK  2024    IR PORT PLACEMENT  2023    IR PORT STRIPPING  2024    LAPAROTOMY N/A 3/12/2024    Procedure: LAPAROTOMY EXPLORATORY W/ ROBOTICS;  Surgeon: Vickie sIrael MD;  [facility];  Service: Surgical Oncology    SD BIOPSY NASOPHARYNX VISIBLE LESION SIMPLE N/A 10/9/2024    Procedure: NASOPHARYNGOSCOPY with biospy;  Surgeon: Juanito Matthew MD;  [facility];  Service: ENT    [facility] W/CELL WASHG SPX N/A 2023    Procedure: BRONCHOSCOPY;  Surgeon: David Chapa MD;  [facility];  Service: ENT    SD LARYNGOSCOPY W/BIOPSY MICROSCOPE/TELESCOPE N/A 2023    Procedure: MICRODIRECT LARYNGOSCOPY WITH BIOPSY;  Surgeon: David Chapa MD;  [facility];  Service: ENT    SD LARYNGOSCOPY W/WO TRACHEOSCOPY DX EXCEPT  N/A 10/9/2024    Procedure: DIRECT LARYNGOSCOPY;  Surgeon: Juanito Matthew MD;  [facility];  Service: ENT    SD MASTECTOMY PARTIAL W/AXILLARY LYMPHADENECTOMY Right 2018    Procedure: ULTRASOUND LOCALIZED PARTIAL MASTECTOMY W/SENTINEL NODE BIOPSY POSS AXILLARY DISSECTION;  Surgeon: Zahida Coronado MD;  Location: [facility];  Service: General    SD TONSILLECTOMY PRIMARY/SECONDARY AGE 13/> N/A 10/9/2024    Procedure: TONSILLECTOMY;  Surgeon: Juanito Matthew MD;   [facility];  Service: ENT    TUBAL LIGATION      US GUIDED BREAST BIOPSY RIGHT COMPLETE Right 11/23/2018    US GUIDED INJECTION FOR RESEARCH STUDY  12/20/2018    US GUIDED INJECTION FOR RESEARCH STUDY  12/07/2018    US GUIDED INJECTION FOR RESEARCH STUDY  05/03/2019    US GUIDED LYMPH NODE BIOPSY RIGHT  05/26/2023     Social History   Social History     Substance and Sexual Activity   Alcohol Use Never     Social History     Substance and Sexual Activity   Drug Use Never     Social History     Tobacco Use   Smoking Status Never    Passive exposure: Never   Smokeless Tobacco Never   Tobacco Comments    NO TOBACCO USE     Family History: Family history non-contributory    Objective:     Vitals:    10/29/24 1151 10/29/24 1345 10/29/24 1800   BP: 163/83 (!) 171/86 141/65   Pulse: 99 98 89   Resp: 18 17 18   Temp: 97.9 °F (36.6 °C)     SpO2: 97% 98% 98%     Temp:  [97.7 °F (36.5 °C)-97.9 °F (36.6 °C)] 97.9 °F (36.6 °C)  HR:  [] 89  BP: (141-171)/(65-90) 141/65  Resp:  [17-18] 18  SpO2:  [97 %-98 %] 98 %  O2 Device: None (Room air)  Weight (last 2 days)       None          No intake or output data in the 24 hours ending 10/29/24 1848  Invasive Devices       Central Venous Catheter Line  Duration             Port A Cath 09/24/24 Right Chest 35 days              Drain  Duration             Gastrostomy/Enterostomy Percutaneous Interventional Gastrostomy 18 Fr. LUQ 27 days                      Physical Exam:     Physical Exam  Vitals and nursing note reviewed.   Constitutional:       General: She is in acute distress.      Appearance: She is well-developed. She is ill-appearing.   HENT:      Head: Normocephalic and atraumatic.      Right Ear: External ear normal.      Left Ear: External ear normal.      Nose: Nose normal.      Mouth/Throat:      Mouth: Mucous membranes are moist.      Comments: Increased saliva, painful swallowing  Eyes:      Conjunctiva/sclera: Conjunctivae normal.   Cardiovascular:      Rate  and Rhythm: Normal rate and regular rhythm.      Pulses: Normal pulses.      Heart sounds: Normal heart sounds. No murmur heard.  Pulmonary:      Effort: Pulmonary effort is normal. No respiratory distress.      Breath sounds: Normal breath sounds.   Abdominal:      General: Abdomen is flat. Bowel sounds are normal.      Palpations: Abdomen is soft.      Tenderness: There is no abdominal tenderness. There is no right CVA tenderness or left CVA tenderness.      Comments: PEG tube    Musculoskeletal:         General: No swelling.      Cervical back: Neck supple.   Skin:     General: Skin is warm and dry.      Capillary Refill: Capillary refill takes less than 2 seconds.   Neurological:      General: No focal deficit present.      Mental Status: She is alert and oriented to person, place, and time. Mental status is at baseline.   Psychiatric:         Mood and Affect: Mood normal.           Labs:     CBC:  Results from last 7 days   Lab Units 10/29/24  1319 10/28/24  1112 10/25/24  1046 10/23/24  0849   WBC Thousand/uL 1.53* 1.58* 1.68* 3.02*   HEMOGLOBIN g/dL 7.6* 7.0* 7.1* 7.1*   HEMATOCRIT % 23.1* 21.7* 21.9* 21.7*   PLATELETS Thousands/uL 133* 154 151 150   TOTAL NEUT ABS Thousands/µL 1.07*  --   --  2.45       CMP:  Results from last 7 days   Lab Units 10/29/24  1319 10/28/24  1112 10/25/24  1046 10/23/24  0849   POTASSIUM mmol/L 4.0 3.6 3.4* 3.8   CHLORIDE mmol/L 105 104 108 109*   CO2 mmol/L 31 31 27 29   BUN mg/dL 32* 31* 42* 43*   CREATININE mg/dL 1.00 1.10 1.19 1.04   CALCIUM mg/dL 8.9 8.6 8.5 8.7   AST U/L 21 28 19 19   ALT U/L 22 26 14 15   ALK PHOS U/L 105* 91 89 96   EGFR ml/min/1.73sq m 58 51 47 55   MAGNESIUM mg/dL  --  1.3* 1.3* 1.6*       Sepsis:  Results from last 7 days   Lab Units 10/29/24  1319   PROCALCITONIN ng/ml 0.14       Micro:         Imaging:     CT soft tissue neck with contrast    Result Date: 10/29/2024  Impression: Posttreatment changes as described without evidence for disease  progression compared to PET/CT from 8/5/2024. No acute abnormality. Workstation performed: [id number]         Medications:     Current Facility-Administered Medications:     acetaminophen (TYLENOL) oral suspension 325 mg, Q6H PRN    [START ON 10/30/2024] anastrozole (ARIMIDEX) tablet 1 mg, Daily    atorvastatin (LIPITOR) tablet 40 mg, HS    [START ON 10/30/2024] cyanocobalamin (VITAMIN B-12) tablet 1,000 mcg, Daily    diphenhydramine, lidocaine, Al/Mg hydroxide, simethicone (Magic Mouthwash) oral solution 10 mL, Q4H PRN    docusate sodium (COLACE) capsule 50 mg, BID    [START ON 10/30/2024] enoxaparin (LOVENOX) subcutaneous injection 40 mg, Daily    ergocalciferol (VITAMIN D2) capsule 50,000 Units, Weekly    folic acid (FOLVITE) tablet 1 mg, Daily    [START ON 10/30/2024] gabapentin (NEURONTIN) capsule 300 mg, Daily    gabapentin (NEURONTIN) capsule 600 mg, HS    HYDROmorphone (DILAUDID) injection 0.5 mg, Q2H PRN    [START ON 10/30/2024] losartan (COZAAR) tablet 50 mg, Daily    mirtazapine (REMERON) tablet 45 mg, HS    ondansetron (ZOFRAN) oral solution 4 mg, Q4H PRN    oxyCODONE (ROXICODONE) oral solution 5 mg, Q6H PRN    [START ON 10/30/2024] pantoprazole (PROTONIX) EC tablet 40 mg, Early Morning    [START ON 10/30/2024] polyethylene glycol (MIRALAX) packet 17 g, Daily    potassium chloride (Klor-Con M20) CR tablet 20 mEq, BID    prochlorperazine (COMPAZINE) tablet 10 mg, Q6H PRN    [START ON 10/30/2024] pyridoxine (VITAMIN B6) tablet 50 mg, Daily    sodium chloride 0.9 % infusion, Continuous    Meds/Allergies   all medications and allergies reviewed  No Known Allergies    Counseling / Coordination of Care  Total floor / unit time spent today 30 minutes.  Greater than 50% of total time was spent with the patient and / or family counseling and / or coordination of care.    Conor Herr MD  Family Medicine, PGY-2  10/29/2024

## 2024-10-29 NOTE — ASSESSMENT & PLAN NOTE
-Placed on 10/2/2024 for nutritional support  -Per chart review:  -TF Goal: 2 containers (474 mL) 3 times daily of Jevity 1.5  -Water Flushin mL free water flush pre/post each bolus (360 mL water) + additional 150 mL water flushes 4 times daily (600 mL water).   -TF provides: 1422 mL total volume (6 cartons), 2133 kcal, 91g protein, 1081 mL free water, 2041 mL total fluid  -Prealbumin on admission low  - Passed speech and swallow test, patient complaint of dysphagia     Assessment: Dependent on PEG tube for nutrition and p.o. med administration due to dysphagia.    Plan:  -N.p.o., continue with home tube feeds and med administration via G-tube - will assess progressing diet per patient's request  -Continue with home vitamins  -Nutrition consulted: continue tube feeds, will increase hydration   -Aspiration/airway precautions

## 2024-10-29 NOTE — ED ATTENDING ATTESTATION
Final Diagnoses:     1. Cancer associated pain    2. S/P percutaneous endoscopic gastrostomy (PEG) tube placement (HCC)    3. On tube feeding diet    4. Fatigue, unspecified type    5. Sensation, choking    6. Chemotherapy induced neutropenia (HCC)    7. 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HCC)    8. Neoplastic malignant related fatigue    9. Metastatic colon cancer to liver (HCC)      ED Course as of 11/02/24 0909   Tue Oct 29, 2024   1429 Hemoglobin(!): 7.6  Stable.   1429 WBC(!): 1.53  Stable to yesterday       I, Francisco Rivera MD, saw and evaluated the patient. All available labs and X-rays were ordered by me or the resident / non-physician and have been reviewed by myself. I discussed the patient with the resident / non-physician and agree with the resident's / non-physician practitioner's findings and plan as documented in the resident's / non-physician practicitioner's note, except where noted.   At this point, I agree with the current assessment done in the ED.   I was present during key portions of all procedures performed unless otherwise stated.     HPI:  NURSING TRIAGE:    This is a 69 y.o. female presenting for evaluation of pain.  The patient has a history of breast cancer, invasive ductal carcinoma, has had a mastectomy, also has metastatic colon cancer currently going undergoing chemotherapy and radiation.  She had a recent T&A.  She has been having increasing pain in the neck especially on the left side of her neck.  No trouble breathing, no trismus, poor oral intake.  She states that she is taking her home medications with her last dose of Oxy being 3 hours ago.  Was poorly controlling the pain.  She has no swelling.  No fevers chills no vomiting.  No chest pain.  Feels weak all over nonfocal.    On chemo:  Fosaprepitant  Cisplatin  On radiation:  Last dose yesterday    Oncology History   Malignant neoplasm of right female breast (HCC)   11/23/2018 Initial Diagnosis    Malignant neoplasm  "of right female breast (HCC)     11/23/2018 Biopsy    Rt Breast US BX 1100 8cmfn, 4 passes 12g Marquee:  - Invasive breast carcinoma of no special type (ductal NST/invasive ductal carcinoma).  - grade 2  - %  - LA 95%  - HER2 negative     12/20/2018 Surgery    Right partial mastectomy and SLN biopsy:  Infiltrating ductal carcinoma, Grade 2/3, felt to be between 2.0 cm and 2.5 cm in greatest dimension  - No invasive tumor is seen at inked margins, but there is a focus of DCIS seen within approximately 1mm of the inked medial margin  - no LVI  - no LCIS  - 0/2 LN's  at least Stage IIA - pT2, pN0, cM0, G2.    - Dr Coronado     12/20/2018 -  Cancer Staged    Stage IIA - pT2, pN0, cM0, G2.       1/4/2019 Genomic Testing    Oncotype DX score: 13     2/1/2019 -  Hormone Therapy    anastrozole 1 mg daily as adjuvant endocrine therapy    - Dr Daley       2/14/2019 - 4/3/2019 Radiation    Course: C1    Plan ID Energy Fractions Dose per Fraction (cGy) Dose Correction (cGy) Total Dose Delivered (cGy) Elapsed Days   R Breast 10X/6X 25 / 25 200 0 5,000 40   R BRST BOOST 16E 5 / 5 200 0 1,000 7      Treatment dates:  C1: 2/14/2019 - 4/3/2019       Metastatic colon cancer to liver (HCC)   7/6/2023 Genetic Testing    CARIS Results:   KRAS Pathogenic Variant Exon 2  p.G12D : lack of benefit of cetuximab, panitumumab Level 2   No other actionable mutation; MSI stable; TMB low   MiFOLOXAi Results: \"Decreased Benefit of FOLFOX + Bevacizumab in first line metastatic CRC.  This patient may achieve improved results by receiving an alternative to FOLFOX, such as FOLFIRI, as their initial regimen. As an adjustment to frontline FOLFOXIRI following toxicity: This patient may achieve improved results by removing the oxaliplatin portion of their regimen\".         7/11/2023 Initial Diagnosis    Metastatic colon cancer to liver (HCC)     7/13/2023 -  Cancer Staged    Staging form: Colon and Rectum, AJCC 8th Edition  - Clinical stage from " 7/13/2023: cT3, cN0, pM1 - Signed by Harika Medina DO on 9/28/2023  Total positive nodes: 0       7/31/2023 - 8/1/2024 Chemotherapy    cyanocobalamin, 1,000 mcg, Intramuscular, Every 30 days, 7 of 9 cycles  Administration: 1,000 mcg (5/9/2024), 1,000 mcg (5/24/2024), 1,000 mcg (6/20/2024), 1,000 mcg (7/3/2024), 1,000 mcg (7/18/2024), 1,000 mcg (8/1/2024)  potassium chloride, 20 mEq, Intravenous, Once, 2 of 2 cycles  Administration: 20 mEq (11/6/2023), 20 mEq (11/20/2023)  alteplase (CATHFLO), 2 mg, Intracatheter, Every 1 Minute as needed, 21 of 23 cycles  Administration: 2 mg (1/3/2024)  pegfilgrastim (NEULASTA), 6 mg, Subcutaneous, Once, 12 of 12 cycles  Administration: 6 mg (10/25/2023), 6 mg (11/8/2023), 6 mg (11/22/2023), 6 mg (12/6/2023), 6 mg (12/20/2023), 6 mg (1/12/2024), 6 mg (1/25/2024), 6 mg (4/25/2024), 6 mg (5/9/2024), 6 mg (5/24/2024), 6 mg (6/20/2024)  fluorouracil (ADRUCIL), 895 mg, Intravenous, Once, 21 of 23 cycles  Dose modification: 320 mg/m2 (original dose 400 mg/m2, Cycle 11)  Administration: 900 mg (7/31/2023), 900 mg (8/14/2023), 900 mg (8/28/2023), 900 mg (9/11/2023), 900 mg (9/25/2023), 900 mg (10/9/2023), 900 mg (10/23/2023), 895 mg (11/6/2023), 895 mg (11/20/2023), 895 mg (12/4/2023), 700 mg (12/18/2023), 680 mg (1/10/2024), 680 mg (1/23/2024), 625 mg (4/23/2024), 625 mg (5/7/2024), 625 mg (5/22/2024), 625 mg (6/4/2024), 625 mg (6/18/2024), 625 mg (7/1/2024), 625 mg (7/16/2024), 625 mg (7/30/2024)  nivolumab (OPDIVO) IVPB, 240 mg (100 % of original dose 240 mg), Intravenous, Once, 13 of 15 cycles  Dose modification: 240 mg (original dose 240 mg, Cycle 9)  Administration: 240 mg (11/20/2023), 240 mg (12/4/2023), 240 mg (12/18/2023), 240 mg (1/10/2024), 240 mg (1/23/2024), 240 mg (4/23/2024), 240 mg (5/7/2024), 240 mg (5/22/2024), 240 mg (6/4/2024), 240 mg (6/18/2024), 240 mg (7/1/2024), 240 mg (7/16/2024), 240 mg (7/30/2024)  leucovorin calcium IVPB, 896 mg, Intravenous, Once, 21 of 23  cycles  Administration: 900 mg (7/31/2023), 900 mg (8/14/2023), 900 mg (8/28/2023), 900 mg (9/11/2023), 900 mg (9/25/2023), 900 mg (10/9/2023), 900 mg (10/23/2023), 900 mg (11/6/2023), 900 mg (11/20/2023), 900 mg (12/4/2023), 900 mg (12/18/2023), 850 mg (1/10/2024), 850 mg (1/23/2024), 800 mg (4/23/2024), 800 mg (5/7/2024), 800 mg (5/22/2024), 800 mg (6/4/2024), 800 mg (6/18/2024), 800 mg (7/1/2024), 800 mg (7/16/2024), 800 mg (7/30/2024)  oxaliplatin (ELOXATIN) chemo infusion, 85 mg/m2 = 190.4 mg, Intravenous, Once, 12 of 12 cycles  Dose modification: 68 mg/m2 (original dose 85 mg/m2, Cycle 11, Reason: Other (Must fill in a comment), Comment: increased fatigue)  Administration: 190.4 mg (7/31/2023), 190.4 mg (8/14/2023), 200 mg (8/28/2023), 200 mg (9/11/2023), 200 mg (9/25/2023), 200 mg (10/9/2023), 200 mg (10/23/2023), 200 mg (11/6/2023), 200 mg (11/20/2023), 190.4 mg (12/4/2023), 150 mg (12/18/2023), 150 mg (1/10/2024)  fluorouracil (ADRUCIL) ambulatory infusion Soln, 1,200 mg/m2/day = 5,375 mg, Intravenous, Over 46 hours, 21 of 23 cycles     9/26/2023 -  Cancer Staged    Staging form: Colon and Rectum, AJCC 8th Edition  - Pathologic: pT3, pN0, cM1 - Signed by Vickie Israel MD on 4/3/2024  Total positive nodes: 0       3/12/2024 Surgery    A. Terminal ileum, appendix, right colon (right hemicolectomy):  - Adenocarcinoma (5.2cm)  - Forty-nine (49) lymph nodes negative for carcinoma (0/49)    - Acellular mucin present in one (1) lymph node   - See staging synoptic (ypT3N0)     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HCC)   7/27/2023 Initial Diagnosis    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HCC)     7/27/2023 -  Cancer Staged    Staging form: Pharynx - Oropharynx, AJCC 8th Edition  - Clinical stage from 7/27/2023: Stage III (cT1, cN3, cM0, p16+) - Signed by Evan Plummer MD on 7/27/2023  Stage prefix: Initial diagnosis       9/17/2024 -  Chemotherapy    alteplase (CATHFLO), 2 mg, Intracatheter,  Every 1 Minute as needed, 6 of 6 cycles  Administration: 2 mg (10/21/2024)  CISplatin (PLATINOL) infusion, 70 mg (original dose 40 mg/m2), Intravenous, Once, 6 of 6 cycles  Dose modification: 70 mg (original dose 40 mg/m2, Cycle 1, Reason: Anticipated Tolerance), 30 mg/m2 (original dose 40 mg/m2, Cycle 4, Reason: Neuropathy, Comment: dose reduction to 30 mg/m2 due to neuropathy)  Administration: 70 mg (9/17/2024), 70 mg (9/23/2024), 70 mg (9/30/2024), 59.1 mg (10/7/2024), 59.1 mg (10/14/2024), 59.1 mg (10/21/2024)  sodium chloride, 500 mL, Intravenous, Once, 6 of 6 cycles  Administration: 500 mL (9/17/2024), 500 mL (9/17/2024), 500 mL (9/23/2024), 500 mL (9/23/2024), 500 mL (9/30/2024), 500 mL (9/30/2024), 500 mL (10/7/2024), 500 mL (10/7/2024), 500 mL (10/14/2024), 500 mL (10/14/2024), 500 mL (10/21/2024)  fosaprepitant (EMEND) IVPB, 150 mg, Intravenous, Once, 6 of 6 cycles  Administration: 150 mg (9/17/2024), 150 mg (9/23/2024), 150 mg (9/30/2024), 150 mg (10/7/2024), 150 mg (10/14/2024), 150 mg (10/21/2024)  IVPB builder, , Intravenous, Once, 1 of 1 cycle  Administration: Unknown dose (10/21/2024)      Chief Complaint   Patient presents with    Pain     Chronic pain related to cancer; pt had radiation treatment yesterday. Swelling noted to neck; pt states it is from rad treatment. Pt states pain with swallowing       PHYSICAL: ASSESSMENT + PLAN:   Pertinent: Appears chronically ill, radiation changes to the skin noticed on the neck.  No oropharyngeal erythema but there is a single dot of blood noticed on the posterior soft palate.  Voice sounds abnormal to me.  No lymph nodes that I can feel.  No trismus.  No crepitus.  Trachea is midline.  Symmetric neck.  No JVD.  No drooling.  Normal swallowing.  No stridulous sounds.  No obvious malocclusion.    General: Appears stated age.   Head: Normocephalic, atraumatic, nontender.  Eyes: PERRL, EOM-I. No diplopia.   No hyphema.   No subconjunctival  hemorrhages.  Symmetrical lids.   ENT: Atraumatic external nose and ears.    Dry MM  No malocclusion. No stridor.  Abnormal phonation. No drooling. Normal swallowing.   Neck: Symmetric, trachea midline. No JVD.  CV: RRR. +S1/S2  No murmurs or gallops  Peripheral pulses +2 throughout. No chest wall tenderness.   Lungs:   Unlabored No retractions  CTAB, lungs sounds equal bilateral.   No tachypnea.   Abd: +BS, soft, NT/ND.   MSK:   FROM   Back:   No rashes  Skin: Dry, intact.   Neuro: AAOx3, GCS 15, CN II-XII grossly intact.   Motor grossly intact.  Psychiatric/Behavioral: Appropriate mood and affect   Exam: deferred    Vitals:    11/01/24 1708 11/01/24 2100 11/01/24 2241 11/02/24 0738   BP: 136/90  158/96 159/95   Pulse: 82 96 82 84   Resp:  18     Temp:   99.6 °F (37.6 °C) 97.9 °F (36.6 °C)   TempSrc:       SpO2: 100% 99% 96% 98%    Will do basic labs for evaluation of weakness including a CBC for radiation/chemotherapy induced causes.  Her next dose of chemo is this week, last one was last week.  Will do a CT of her neck looking for infiltrative changes, masses, or any other reason for the change in voice.  Pain control as necessary.  IV fluids for hydration.     There are no obvious limitations to social determinants of care.   Nursing note reviewed.   Vitals reviewed.   Orders placed by myself and/or advanced practitioner / resident.    Previous chart was reviewed  No language barrier.   History obtained from patient.    There are no limitations to the history obtained:     Past Medical: Past Surgical:    has a past medical history of Anemia, Arthritis, Body mass index (BMI) 40.0-44.9, adult (HCC), Breast cancer (HCC) (12/17/2018), Cancer (HCC), Cervical lymphadenopathy, Encounter for screening for HIV (07/07/2022), Follow-up examination (04/04/2023), GERD (gastroesophageal reflux disease), Hyperlipidemia, Hypertension, Obesity, Stroke (HCC), Stroke (HCC), Transaminitis (09/22/2021), and Vasomotor rhinitis.   has a past surgical history that includes US guided breast biopsy right complete (Right, 2018); Breast surgery; pr mastectomy partial w/axillary lymphadenectomy (Right, 2018); Tubal ligation; Breast biopsy (Right, 2018); US guided injection for research study (2018); US guided injection for research study (2018); US guided injection for research study (2019); US guided lymph node biopsy right (2023); IR biopsy liver mass (2023); pr laryngoscopy w/biopsy microscope/telescope (N/A, 2023); pr [facility] incl fluor gdnce dx w/cell washg spx (N/A, 2023); ESOPHAGOSCOPY (N/A, 2023); IR port placement (2023); IR port check (2024); IR port stripping (2024); Colonoscopy; Hemicoloectomy w/ anastomosis (Right, 3/12/2024); LAPAROTOMY (N/A, 3/12/2024); IR cryoablation (6/3/2024); IR gastrostomy tube placement (10/2/2024); pr laryngoscopy w/wo tracheoscopy dx except  (N/A, 10/9/2024); pr biopsy nasopharynx visible lesion simple (N/A, 10/9/2024); and pr tonsillectomy primary/secondary age 12/> (N/A, 10/9/2024).   Social: Cardiac (Echo/Cath)   Social History     Substance and Sexual Activity   Alcohol Use Never     Social History     Tobacco Use   Smoking Status Never    Passive exposure: Never   Smokeless Tobacco Never   Tobacco Comments    NO TOBACCO USE     Social History     Substance and Sexual Activity   Drug Use Never    No results found for this or any previous visit.    No results found for this or any previous visit.    No results found for this or any previous visit.     Labs: Imaging:   Labs Reviewed   CBC AND DIFFERENTIAL - Abnormal       Result Value Ref Range Status    WBC 1.53 (*) 4.31 - 10.16 Thousand/uL Final    RBC 2.64 (*) 3.81 - 5.12 Million/uL Final    Hemoglobin 7.6 (*) 11.5 - 15.4 g/dL Final    Hematocrit 23.1 (*) 34.8 - 46.1 % Final    MCV 88  82 - 98 fL Final    MCH 28.8  26.8 - 34.3 pg Final    MCHC 32.9  31.4 - 37.4  g/dL Final    RDW 17.6 (*) 11.6 - 15.1 % Final    MPV 9.9  8.9 - 12.7 fL Final    Platelets 133 (*) 149 - 390 Thousands/uL Final    nRBC 0  /100 WBCs Final    Segmented % 70  43 - 75 % Final    Immature Grans % 0  0 - 2 % Final    Lymphocytes % 12 (*) 14 - 44 % Final    Monocytes % 16 (*) 4 - 12 % Final    Eosinophils Relative 1  0 - 6 % Final    Basophils Relative 1  0 - 1 % Final    Absolute Neutrophils 1.07 (*) 1.85 - 7.62 Thousands/µL Final    Absolute Immature Grans 0.00  0.00 - 0.20 Thousand/uL Final    Absolute Lymphocytes 0.19 (*) 0.60 - 4.47 Thousands/µL Final    Absolute Monocytes 0.24  0.17 - 1.22 Thousand/µL Final    Eosinophils Absolute 0.02  0.00 - 0.61 Thousand/µL Final    Basophils Absolute 0.01  0.00 - 0.10 Thousands/µL Final   COMPREHENSIVE METABOLIC PANEL - Abnormal    Sodium 143  135 - 147 mmol/L Final    Potassium 4.0  3.5 - 5.3 mmol/L Final    Chloride 105  96 - 108 mmol/L Final    CO2 31  21 - 32 mmol/L Final    ANION GAP 7  4 - 13 mmol/L Final    BUN 32 (*) 5 - 25 mg/dL Final    Creatinine 1.00  0.60 - 1.30 mg/dL Final    Comment: Standardized to IDMS reference method    Glucose 102  65 - 140 mg/dL Final    Comment: If the patient is fasting, the ADA then defines impaired fasting glucose as > 100 mg/dL and diabetes as > or equal to 123 mg/dL.    Calcium 8.9  8.4 - 10.2 mg/dL Final    Corrected Calcium 9.5  8.3 - 10.1 mg/dL Final    AST 21  13 - 39 U/L Final    ALT 22  7 - 52 U/L Final    Comment: Specimen collection should occur prior to Sulfasalazine administration due to the potential for falsely depressed results.     Alkaline Phosphatase 105 (*) 34 - 104 U/L Final    Total Protein 6.9  6.4 - 8.4 g/dL Final    Albumin 3.2 (*) 3.5 - 5.0 g/dL Final    Total Bilirubin 0.33  0.20 - 1.00 mg/dL Final    Comment: Use of this assay is not recommended for patients undergoing treatment with eltrombopag due to the potential for falsely elevated results.  N-acetyl-p-benzoquinone imine (metabolite of  Acetaminophen) will generate erroneously low results in samples for patients that have taken an overdose of Acetaminophen.    eGFR 58  ml/min/1.73sq m Final    Narrative:     National Kid[employer] Disease Foundation guidelines for Chronic Kidney Disease (CKD):     Stage 1 with normal or high GFR (GFR > 90 mL/min/1.73 square meters)    Stage 2 Mild CKD (GFR = 60-89 mL/min/1.73 square meters)    Stage 3A Moderate CKD (GFR = 45-59 mL/min/1.73 square meters)    Stage 3B Moderate CKD (GFR = 30-44 mL/min/1.73 square meters)    Stage 4 Severe CKD (GFR = 15-29 mL/min/1.73 square meters)    Stage 5 End Stage CKD (GFR <15 mL/min/1.73 square meters)  Note: GFR calculation is accurate only with a steady state creatinine   PLATELET COUNT - Abnormal    Platelets 147 (*) 149 - 390 Thousands/uL Final    MPV 9.9  8.9 - 12.7 fL Final   LACTIC ACID, PLASMA (W/REFLEX IF RESULT > 2.0) - Normal    LACTIC ACID 1.3  0.5 - 2.0 mmol/L Final    Narrative:     Result may be elevated if tourniquet was used during collection.   PROCALCITONIN TEST - Normal    Procalcitonin 0.14  <=0.25 ng/ml Final    Comment: Suspected Lower Respiratory Tract Infection (LRTI):  - LESS than or EQUAL to 0.25 ng/mL:   low likelihood for bacterial LRTI; antibiotics DISCOURAGED.  - GREATER than 0.25 ng/mL:   increased likelihood for bacterial LRTI; antibiotics ENCOURAGED.    Suspected Sepsis:  - Strongly consider initiating antibiotics in ALL UNSTABLE patients.  - LESS than or EQUAL to 0.5 ng/mL:   low likelihood for bacterial sepsis; antibiotics DISCOURAGED.  - GREATER than 0.5 ng/mL:   increased likelihood for bacterial sepsis; antibiotics ENCOURAGED.  - GREATER than 2 ng/mL:   high risk for severe sepsis / septic shock; antibiotics strongly ENCOURAGED.    Decisions on antibiotic use should not be based solely on Procalcitonin (PCT) levels. If PCT is low but uncertainty exists with stopping antibiotics, repeat PCT in 6-24 hours to confirm the low level. If antibiotics are  administered (regardless if initial PCT was high or low), repeat PCT every 1-2 days to consider early antibiotic cessation (when GREATER than 80% decrease from the peak OR when PCT drops below designated cutoffs, whichever comes first), so long as the infection is NOT one that typically requires prolonged treatment durations (e.g., bone/joint infections, endocarditis, Staph. aureus bacteremia).    Situations of FALSE-POSITIVE Procalcitonin values:  1) Newborns < 72 hours old  2) Massive stress from severe trauma / burns, major surgery, acute pancreatitis, cardiogenic / hemorrhagic shock, sickle cell crisis, or other organ perfusion abnormalities  3) Malaria and some Candidal infections  4) Treatment with agents that stimulate cytokines (e.g., OKT3, anti-lymphocyte globulins, alemtuzumab, IL-2, granulocyte transfusion [NOT GCSFs])  5) Chronic renal disease causes elevated baseline levels (consider GREATER than 0.75 ng/mL as an abnormal cut-off); initiating HD/CRRT may cause transient decreases  6) Paraneoplastic syndromes from medullary thyroid or SCLC, some forms of vasculitis, and acute [id number] disease    Situations of FALSE-NEGATIVE Procalcitonin values:  1) Too early in clinical course for PCT to have reached its peak (may repeat in 6-24 hours to confirm low level)  2) Localized infection WITHOUT systemic (SIRS / sepsis) response (e.g., an abscess, osteomyelitis, cystitis)  3) Mycobacteria (e.g., Tuberculosis, MAC)  4) Cystic fibrosis exacerbations     URINALYSIS WITH REFLEX TO SCOPE    XR chest pa and lateral   Final Result      No acute cardiopulmonary disease.            Workstation performed: [id number]         CT soft tissue neck with contrast   Final Result      Posttreatment changes as described without evidence for disease progression compared to PET/CT from 8/5/2024.      No acute abnormality.      Workstation performed: [id number]            Medications: Code Status:   Medications   diphenhydramine,  lidocaine, Al/Mg hydroxide, simethicone (Magic Mouthwash) oral solution 10 mL (has no administration in time range)   ergocalciferol (VITAMIN D2) capsule 50,000 Units (50,000 Units Oral Not Given 10/29/24 1944)   enoxaparin (LOVENOX) subcutaneous injection 40 mg (40 mg Subcutaneous Given 11/2/24 0820)   atorvastatin (LIPITOR) tablet 40 mg (40 mg Per G Tube Given 11/1/24 2219)   cyanocobalamin (VITAMIN B-12) tablet 1,000 mcg (1,000 mcg Per G Tube Given 11/2/24 0820)   folic acid (FOLVITE) tablet 1 mg (1 mg Per G Tube Given 11/2/24 0820)   gabapentin (NEURONTIN) oral solution 300 mg (300 mg Per G Tube Given 11/2/24 0841)   gabapentin (NEURONTIN) oral solution 600 mg (600 mg Per G Tube Given 11/1/24 2224)   losartan (COZAAR) tablet 50 mg (50 mg Per G Tube Given 11/2/24 0820)   mirtazapine (REMERON) tablet 45 mg (45 mg Per G Tube Given 11/1/24 2220)   pantoprazole (PROTONIX) injection 40 mg (40 mg Intravenous Given 11/2/24 0820)   polyethylene glycol (MIRALAX) packet 17 g (17 g Per G Tube Not Given 11/2/24 0820)   pyridoxine (VITAMIN B6) tablet 50 mg (50 mg Per G Tube Given 11/1/24 0913)   potassium chloride oral solution 20 mEq (20 mEq Per G Tube Given 11/2/24 0820)   docusate (COLACE) oral liquid 50 mg (50 mg Oral Given 11/2/24 0830)   anastrozole (ARIMIDEX) tablet 1 mg (1 mg Per G Tube Given 11/2/24 0840)   ondansetron (ZOFRAN) oral solution 4 mg (has no administration in time range)   acetaminophen (TYLENOL) oral suspension 650 mg (650 mg Per G Tube Given 11/2/24 0521)   oxyCODONE (ROXICODONE) IR tablet 5 mg (5 mg Oral Given 10/31/24 2109)   labetalol (NORMODYNE) injection 10 mg (has no administration in time range)   amLODIPine (NORVASC) tablet 5 mg (5 mg Per G Tube Given 11/2/24 4021)   HYDROmorphone (DILAUDID) injection 1 mg (1 mg Intravenous Given 10/29/24 1345)   iohexol (OMNIPAQUE) 350 MG/ML injection (MULTI-DOSE) 85 mL (85 mL Intravenous Given 10/29/24 1451)   sodium chloride 0.9 % bolus 1,000 mL (0 mL  Intravenous Stopped 10/30/24 0210)   piperacillin-tazobactam (ZOSYN) 4.5 g in sodium chloride 0.9 % 100 mL IV LOADING DOSE (0 g Intravenous Stopped 10/30/24 0209)   magnesium sulfate 2 g/50 mL IVPB (premix) 2 g (0 g Intravenous Stopped 11/1/24 0909)   magnesium sulfate 2 g/50 mL IVPB (premix) 2 g (2 g Intravenous New Bag 11/1/24 1635)    Code Status: Level 1 - Full Code  Advance Directive and Living Will:      Power of :    POLST:       Orders Placed This Encounter   Procedures    Blood culture    Blood culture    COVID/FLU/RSV    Occult blood 1-3, stool    CT soft tissue neck with contrast    XR chest pa and lateral    CBC and differential    Comprehensive metabolic panel    Comprehensive metabolic panel    Magnesium    Phosphorus    CBC and differential    Platelet count    Lactic acid, plasma (w/reflex if result > 2.0)    Procalcitonin    Procalcitonin, Next Day AM Collection    UA (URINE) with reflex to Scope    Prealbumin    Hemoglobin and hematocrit, blood    Hemoglobin and hematocrit, blood    CBC and differential    Comprehensive metabolic panel    Magnesium    CBC and differential    Basic metabolic panel    Magnesium    Phosphorus    Anemia Panel w/Reflex    LD,Blood    Haptoglobin    Retic Count    Peripheral Smear    CBC and differential    Basic metabolic panel    Magnesium    Phosphorus    Haptoglobin    Anemia Panel w/Reflex    Retic Count    Peripheral Smear    CBC and differential    Comprehensive metabolic panel    Magnesium    TIBC Panel (incl. Iron, TIBC, % Iron Saturation)    Ferritin    Diet Enteral/Parenteral; Tube Feeding No Oral Diet; Jevity 1.5; Bolus; 474; (3x/day) - 8:00AM,12:00PM,5:00PM; 60; Before and After each Bolus    Access port-a-cath    Vital Signs per unit routine    Up and OOB as tolerated    Insert peripheral IV    Maintain IV access    Apply SCD or Foot pumps    Monitor airway    I/O    Vital Signs    Notify physician and Hold transfusion if:    Vital Signs    Notify  physician and Hold transfusion if:    Nursing Communication Okay to give ice chips, did pass speech and swallow. Please ask pt to try to be upright when getting ice chips.    Level 1-Full Code: all life saving measures are indicated    Inpatient Consult to Nutrition Services    Inpatient consult to Hematology    Inpatient consult to Palliative Care    Inpatient consult to ENT    Inpatient consult to Case Management    OT eval and treat    PT eval and treat    SPEECH bedside swallowing evaluation and treatment    ECG 12 lead    ECG 12 lead    Type and screen    Prepare Leukoreduced RBC: 2 Units, Irradiated    Place in Observation    INPATIENT ADMISSION    Aspiration precautions    Fall precautions    Neutropenic precautions     Time reflects when diagnosis was documented in both MDM as applicable and the Disposition within this note       Time User Action Codes Description Comment    10/29/2024  4:12 PM Kimberly George Add [G89.3] Cancer associated pain     10/29/2024  5:10 PM Conor Herr Add [Z93.1] S/P percutaneous endoscopic gastrostomy (PEG) tube placement (HCC)     10/29/2024  5:10 PM Conor Herr Add [Z78.9] On tube feeding diet     10/29/2024  5:27 PM Conor Herr Add [R53.83] Fatigue, unspecified type     10/29/2024  5:53 PM Conor Herr Add [R09.89] Sensation, choking     10/30/2024  8:15 AM Conor Herr Add [D70.1,  T45.1X5A] Chemotherapy induced neutropenia (HCC)     10/30/2024  8:15 AM Conor Herr Add [C09.9] 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HCC)     10/30/2024 11:59 AM Carole Newman Add [R53.0] Neoplastic malignant related fatigue     10/30/2024 11:59 AM Carole Newman Add [C18.9,  C78.7] Metastatic colon cancer to liver (HCC)           ED Disposition       ED Disposition   Admit    Condition   Stable    Date/Time   Tue Oct 29, 2024  4:12 PM    Comment                  Follow-up Information       Follow up With Specialties Details Why Contact Info     Sami Tolbert MD Radiation Oncology Follow up  [address]  [address]  [facility] [address] [phone]      Marina Long MD Family Medicine Follow up  [address]  [address]  [facility] [address]  [phone]            Current Discharge Medication List        CONTINUE these medications which have NOT CHANGED    Details   acetaminophen (TYLENOL) 160 mg/5 mL liquid Take 20 mL (640 mg total) by mouth every 6 (six) hours as needed for mild pain for up to 10 days  Qty: 473 mL, Refills: 0    Associated Diagnoses: Status post tonsillectomy      anastrozole (ARIMIDEX) 1 mg tablet Take 1 tablet (1 mg total) by mouth daily  Qty: 90 tablet, Refills: 3    Associated Diagnoses: Malignant neoplasm of upper-outer quadrant of right breast in female, estrogen receptor positive (HCC)      atorvastatin (LIPITOR) 40 mg tablet Take 1 tablet (40 mg total) by mouth daily at bedtime  Qty: 30 tablet, Refills: 2    Associated Diagnoses: Mixed hyperlipidemia      diphenhydramine, lidocaine, Al/Mg hydroxide, simethicone (Magic Mouthwash) SUSP Swish and swallow 10 mL every 4 (four) hours as needed for mouth pain or discomfort  Qty: 480 mL, Refills: 1    Associated Diagnoses: 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HCC)      docusate sodium (COLACE) 50 mg capsule Take 1 capsule (50 mg total) by mouth 2 (two) times a day  Qty: 90 capsule, Refills: 1    Associated Diagnoses: Metastatic colon cancer to liver (HCC); Drug-induced constipation      ergocalciferol (VITAMIN D2) 50,000 units Take 1 capsule (50,000 Units total) by mouth once a week  Qty: 90 capsule, Refills: 3    Associated Diagnoses: Vitamin D deficiency      folic acid (FOLVITE) 1 mg tablet Take 1 tablet (1 mg total) by mouth in the morning  Qty: 90 tablet, Refills: 3    Associated Diagnoses: Metastatic colon cancer to liver (HCC)      gabapentin (NEURONTIN) 300 mg capsule Take 1 pills in the morning, 1 pill at lunch and 2 pills before  bed  Qty: 120 capsule, Refills: 3    Associated Diagnoses: Metastatic colon cancer to liver (HCC)      hydrocortisone 1 % ointment       ibuprofen (MOTRIN) 100 mg/5 mL suspension Take 20 mL (400 mg total) by mouth every 6 (six) hours as needed for moderate pain  Qty: 473 mL, Refills: 0    Associated Diagnoses: Status post tonsillectomy      lidocaine-prilocaine (EMLA) cream Apply topically as needed for mild pain  Qty: 30 g, Refills: 3    Associated Diagnoses: Metastatic colon cancer to liver (HCC)      losartan (COZAAR) 50 mg tablet Take 1 tablet (50 mg total) by mouth daily  Qty: 90 tablet, Refills: 5    Associated Diagnoses: Primary hypertension      !! mirtazapine (REMERON) 15 mg tablet Take 1 tablet (15 mg total) by mouth daily at bedtime Patient is also on a 30 mg tablet, for a total dose of 45 mg  Qty: 30 tablet, Refills: 3    Associated Diagnoses: Metastatic colon cancer to liver (HCC)      !! mirtazapine (REMERON) 30 mg tablet Take 1 tablet (30 mg total) by mouth daily at bedtime  Qty: 30 tablet, Refills: 3    Associated Diagnoses: Metastatic colon cancer to liver (HCC)      omeprazole (PriLOSEC) 20 mg delayed release capsule Take 1 capsule (20 mg total) by mouth daily  Qty: 30 capsule, Refills: 5    Associated Diagnoses: Abnormal PET scan of colon      ondansetron (ZOFRAN) 8 mg tablet Take 1 tablet (8 mg total) by mouth every 8 (eight) hours as needed for nausea or vomiting  Qty: 90 tablet, Refills: 2    Associated Diagnoses: Metastatic colon cancer to liver (HCC)      oxyCODONE (ROXICODONE) 5 mg/5 mL solution Take 5 mL (5 mg total) by mouth every 6 (six) hours as needed for severe pain ((breakthrough pain)) Max Daily Amount: 20 mg  Qty: 473 mL, Refills: 0    Associated Diagnoses: Status post tonsillectomy      potassium chloride (Klor-Con M20) 20 mEq tablet Take 1 tablet (20 mEq total) by mouth 2 (two) times a day  Qty: 90 tablet, Refills: 1    Associated Diagnoses: Hypokalemia      prochlorperazine  (COMPAZINE) 10 mg tablet Take 1 tablet (10 mg total) by mouth every 6 (six) hours as needed for nausea or vomiting  Qty: 90 tablet, Refills: 2    Associated Diagnoses: Metastatic colon cancer to liver (HCC)      pyridoxine (VITAMIN B6) 50 mg tablet Take 1 tablet (50 mg total) by mouth daily  Qty: 90 tablet, Refills: 3    Associated Diagnoses: Neuropathy      vitamin B-12 (VITAMIN B-12) 1,000 mcg tablet        !! - Potential duplicate medications found. Please discuss with provider.        No discharge procedures on file.  Prior to Admission Medications   Prescriptions Last Dose Informant Patient Reported? Taking?   acetaminophen (TYLENOL) 160 mg/5 mL liquid   No No   Sig: Take 20 mL (640 mg total) by mouth every 6 (six) hours as needed for mild pain for up to 10 days   anastrozole (ARIMIDEX) 1 mg tablet  Self No No   Sig: Take 1 tablet (1 mg total) by mouth daily   atorvastatin (LIPITOR) 40 mg tablet   No No   Sig: Take 1 tablet (40 mg total) by mouth daily at bedtime   diphenhydramine, lidocaine, Al/Mg hydroxide, simethicone (Magic Mouthwash) SUSP   No No   Sig: Swish and swallow 10 mL every 4 (four) hours as needed for mouth pain or discomfort   docusate sodium (COLACE) 50 mg capsule  Self No No   Sig: Take 1 capsule (50 mg total) by mouth 2 (two) times a day   ergocalciferol (VITAMIN D2) 50,000 units  Self No No   Sig: Take 1 capsule (50,000 Units total) by mouth once a week   folic acid (FOLVITE) 1 mg tablet  Self No No   Sig: Take 1 tablet (1 mg total) by mouth in the morning   gabapentin (NEURONTIN) 300 mg capsule   No No   Sig: Take 1 pills in the morning, 1 pill at lunch and 2 pills before bed   hydrocortisone 1 % ointment  Self Yes No   ibuprofen (MOTRIN) 100 mg/5 mL suspension   No No   Sig: Take 20 mL (400 mg total) by mouth every 6 (six) hours as needed for moderate pain   lidocaine-prilocaine (EMLA) cream  Self No No   Sig: Apply topically as needed for mild pain   losartan (COZAAR) 50 mg tablet   No No  "  Sig: Take 1 tablet (50 mg total) by mouth daily   mirtazapine (REMERON) 15 mg tablet   No No   Sig: Take 1 tablet (15 mg total) by mouth daily at bedtime Patient is also on a 30 mg tablet, for a total dose of 45 mg   mirtazapine (REMERON) 30 mg tablet   No No   Sig: Take 1 tablet (30 mg total) by mouth daily at bedtime   omeprazole (PriLOSEC) 20 mg delayed release capsule   No No   Sig: Take 1 capsule (20 mg total) by mouth daily   ondansetron (ZOFRAN) 8 mg tablet   No No   Sig: Take 1 tablet (8 mg total) by mouth every 8 (eight) hours as needed for nausea or vomiting   oxyCODONE (ROXICODONE) 5 mg/5 mL solution   No No   Sig: Take 5 mL (5 mg total) by mouth every 6 (six) hours as needed for severe pain ((breakthrough pain)) Max Daily Amount: 20 mg   potassium chloride (Klor-Con M20) 20 mEq tablet   No No   Sig: Take 1 tablet (20 mEq total) by mouth 2 (two) times a day   prochlorperazine (COMPAZINE) 10 mg tablet   No No   Sig: Take 1 tablet (10 mg total) by mouth every 6 (six) hours as needed for nausea or vomiting   pyridoxine (VITAMIN B6) 50 mg tablet   No No   Sig: Take 1 tablet (50 mg total) by mouth daily   vitamin B-12 (VITAMIN B-12) 1,000 mcg tablet   Yes No      Facility-Administered Medications: None                        Portions of the record may have been created with voice recognition software. Occasional wrong word or \"sound a like\" substitutions may have occurred due to the inherent limitations of voice recognition software. Read the chart carefully and recognize, using context, where substitutions have occurred.    Electronically signed by:  Francisco Rivera  "

## 2024-10-29 NOTE — ASSESSMENT & PLAN NOTE
"-C/o progressively worsening choking sensation for past few weeks, mainly when patient lying down or asleep  -Has not been able to take anything by mouth except small sips of water  -Also has had significant pain around throat  -In the setting of right SCC with recent right tonsillectomy, and recent 6 cycles of cisplatin with 7 cycle held given her symptoms of progressively working worsening \"choking sensation\" and worsening fatigue  -see A&P under \"neoplastic malignant related fatigue\" for more details  -10/29/2024: CT neck soft tissue without contrast: Posttreatment changes as described without evidence for disease progression compared to PET/CT from 8/5/2024. No acute abnormality.  - Passed speech and swallow test; patient complains of pain and  - ENT: no interventions inpatient   Assessment: Dysphagia, dependent on G-tube for nutrition and medication administration    Improving     Plan:  -Home meds to be administered via G-tube  -Continue with home tube feeds - will consult speech to assess progressing diet per patient's request  -Magic mouthwash as needed.  -Nutrition consulted, appreciate recommendations   - Resume home feeds via G tube  -Aspiration precautions, airway precautions  "

## 2024-10-29 NOTE — QUICK NOTE
Quick Note from Hematology Team:     Was messaged by primary team provider regarding pt's presentation to ER, requesting for recommendations. Please review OP oncology notes for detailed onc hx. Patient has a hx of early stage right breast cancer (s/p partial mastectomy in December 2018), metastatic colon cancer (s/p right hemicolectomy in March 2024, followed by cryoablation to liver in June 2024), and locally advanced tonsillar SCC. Most recently, patient has been dealing with progression of right tonsil cancer as PET scan from August 2024 showed interval progression of hypermetabolic disease involving the neck with increased FDG activity. Patient was recommended chemoRT, which commenced on 9/17/24, and also appears to have underwent tonsillectomy on 10/9/24 with ENT team. When patient was seen at oncology office today, she was ill appearing with extreme fatigue, uncontrollable pain, and inability to tolerate much orally. Patient was recommended ER evaluation.     CT neck just showed post treatment changes.     Patient appears to be dealing with treatment related complications from ongoing chemoradiation and recent tonsillectomy. She will need supportive care with IV fluids, palliative care team consult for optimization of pain management, and nutrition consult to evaluate whether she is getting adequate calories through her feeding tube. Additionally, recommend ENT consult for any other recommendations in setting of recent tonsillectomy. With her ongoing neutropenia, recommend checking blood cultures and any other cultures as warranted if there are any active signs of symptoms of infection. Ok to hold off on any abx if there are no active signs of infection. Additionally, patient's PET scan from August 2024 had reported interval progression of hypermetabolic metastatic disease. Consider getting a CT abd/pelvis while patient is admitted to evaluate the status of the liver lesions, which is likely related to colon  cancer.     Above recommendations were relayed to primary team provider.    Further recommendations to follow from day team tomorrow.

## 2024-10-29 NOTE — ASSESSMENT & PLAN NOTE
-69-year-old female with an extensive oncologic history who was a direct admit from oncologic office due to persistent new fatigue.  Of note, patient recently was started on cisplatin last month for her right tonsillar SCC.  -Brief oncologic history:   -Late 2018 patient was found to have invasive cancer of the right breast; status post partial mastectomy, radiation --has since been on anastrozole  -July 2023: SCC of right tonsil + colorectal cancer with metastasis to the liver.  Briefly, patient had initially presented in 3/2023 per chart review to PCP with complaints of sore throat.  Had imaging and ultimately biopsy done that was concerning for carcinoma.  Eventually got a PET/CT which showed multiple hypermetabolic lesions in chest, abdomen and pelvis.   -Colorectal cancer with mets to liver:   -Had a right hemicolectomy in 3/2024 due to partial obstruction.  Had a cryoablation of liver mets in 6/2024. Chemo.    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  Subsequent tumor board culminated in recommendations for reevaluation by ENT and radiation oncology, as well as definitive chemoradiation treatment.  -In the last 6 weeks, patient has had cisplatin; seventh week has been held by heme-onc due to poor tolerance  -10/2 had gastrostomy tube placed for nutritional support by IR  -10/29 presented to outpatient heme-onc for follow-up.  Has significant fatigue and pain compared to previous visits.  Also complained of choking sensation worsening in the last few weeks.  Subsequently recommended to be transferred for direct admit to [facility] for further evaluation.    Assessment: Worsening fatigue, mild tachycardia, along with chemo-induced  "neutropenia differentials include infection, dehydration, intolerance to chemo    Plan:  -Infectious workup initiated:   -BCx x 2     - No growth at 48h   -UA pending: not collected   -CXR negative; flu/COVID-negative   - Empirically placed on ceftriaxone/metronidazole starting 10/30/24; previously on zosyn for 2 doses    - will confirm with H/O for d/c on 11/1  -Lactic acid, procalcitonin on admission WNL  -Neutropenia precautions  -Closely monitor I/O  -Monitor WBC, VS, other S/S of infection  -Continue with G tube feeds as noted by outpatient  -Continue with home gabapentin; per chart review has been having bilateral leg paresthesias    -Consult nutrition   - Home tube feeds resumed   - Increase hydration while inpatient  -Consult heme-onc inpatient;   - Continue stabilization of patient, as of 11/2 heme/onc not requiring any further inpatient work up  -Consult ENT given worsening \" choking\" symptoms and dysphagia   - No inpatient interventions  -Consult palliative care   - pain medications adjusted for increased somnolence    - CM for outpatient resources   - PT/OT while inpatient  -Continue with anastrozol  "

## 2024-10-29 NOTE — ASSESSMENT & PLAN NOTE
Home meds: Losartan 50 mg  Systolic (24hrs), Av , Min:136 , Max:173   Diastolic (24hrs), Av, Min:90, Max:96    Uncontrolled    Plan:  -Continue with losartan 50 mg daily  - Add amlodipine 5 mg daily

## 2024-10-29 NOTE — ASSESSMENT & PLAN NOTE
-Briefly: Diagnosed with invasive carcinoma of right breast, status post partial mastectomy and radiation  -Has since been on home anastrozole 1 mg daily    Plan:  -Continue with home anastrozole

## 2024-10-29 NOTE — TELEPHONE ENCOUNTER
Spoke with patient to see if her lyft ride came to pick her up yet. Patient states she was unaware of her appt. Patient will get ready now and I informed her that her ride will be there shortly. I advised her to call me if there is any issues. Patient verbalized understanding.

## 2024-10-29 NOTE — ED PROVIDER NOTES
Time reflects when diagnosis was documented in both MDM as applicable and the Disposition within this note       Time User Action Codes Description Comment    10/29/2024  4:12 PM LUZlelaKimberly godinez Add [G89.3] Cancer associated pain     10/29/2024  5:10 PM Conor Herr Add [Z93.1] S/P percutaneous endoscopic gastrostomy (PEG) tube placement (HCC)     10/29/2024  5:10 PM Conor Herr Add [Z78.9] On tube feeding diet     10/29/2024  5:27 PM Cnoor Herr Add [R53.83] Fatigue, unspecified type     10/29/2024  5:53 PM Conor Herr Add [R09.89] Sensation, choking     10/30/2024  8:15 AM Conor Herr Add [D70.1,  T45.1X5A] Chemotherapy induced neutropenia (HCC)     10/30/2024  8:15 AM Conor Herr Add [C09.9] 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HCC)     10/30/2024 11:59 AM Carole Newman Add [R53.0] Neoplastic malignant related fatigue     10/30/2024 11:59 AM Carole Newman Add [C18.9,  C78.7] Metastatic colon cancer to liver (HCC)           ED Disposition       ED Disposition   Admit    Condition   Stable    Date/Time   Tue Oct 29, 2024  4:12 PM    Comment                  Assessment & Plan       Medical Decision Making  Amount and/or Complexity of Data Reviewed  Labs: ordered.  Radiology: ordered.    Risk  Prescription drug management.  Decision regarding hospitalization.    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  Denies bleeding, tolerating secretions, no fevers nausea or vomiting.  States that patient is unable to perform her ADLs given the severe pain and fatigue.    Initial presentation pt is nontoxic    On exam   General: Chronically ill-appearing, no acute distress  Eyes: PERRL, EOM-I. No diplopia.   No hyphema.   No subconjunctival hemorrhages.  Symmetrical lids.   ENT: Atraumatic external nose and ears.     MMM  Normal postop changes of the right tonsillar region, no active bleeding or abscess noted  No malocclusion. No stridor. Normal phonation. No drooling. Normal swallowing.  No trismus  Neck: Mildly diffusely swollen, with no induration, no superficial erythema symmetric, trachea midline. No JVD.  CV: RRR. +S1/S2  No murmurs or gallops  Peripheral pulses +2 throughout. No chest wall tenderness.   Lungs:   Unlabored No retractions  CTAB, lungs sounds equal bilateral.   No tachypnea.   Abd: +BS, soft, NT/ND.   MSK:   FROM   Back:   No rashes  Skin: Dry, intact.   Neuro: AAOx3, GCS 15, CN II-XII grossly intact.   Motor grossly intact.  Psychiatric/Behavioral: Appropriate mood and affect   Exam: deferred    Ddx: Anemia, neutropenia, complications of radiation/tonsillectomy.  Patient was evaluated with basic labs and CT soft tissue neck.  Imaging unremarkable, interpretation.  Labs remarkable for neutropenia and hemoglobin at 7.6 which is stable from yesterday.  Patient was admitted to medicine for pain control and further evaluation.               Medications   diphenhydramine, lidocaine, Al/Mg hydroxide, simethicone (Magic Mouthwash) oral solution 10 mL (has no administration in time range)   ergocalciferol (VITAMIN D2) capsule 50,000 Units (50,000 Units Oral Not Given 10/29/24 1944)   enoxaparin (LOVENOX) subcutaneous injection 40 mg (has no administration in time range)   HYDROmorphone (DILAUDID) injection 1 mg (1 mg Intravenous Given 10/29/24 1345)   iohexol (OMNIPAQUE) 350 MG/ML injection (MULTI-DOSE) 85 mL (85 mL Intravenous Given 10/29/24 1451)       ED Risk Strat Scores                           SBIRT 20yo+      Flowsheet Row Most Recent Value   Initial Alcohol Screen: US AUDIT-C     1. How often do you have a drink containing alcohol? 0 Filed at: 10/29/2024 1152   2. How many drinks containing alcohol do you have on a typical day you are drinking?  0 Filed at: 10/29/2024 1152   3a. Male UNDER 65: How often  do you have five or more drinks on one occasion? 0 Filed at: 10/29/2024 1152   3b. FEMALE Any Age, or MALE 65+: How often do you have 4 or more drinks on one occassion? 0 Filed at: 10/29/2024 1152   Audit-C Score 0 Filed at: 10/29/2024 1152   TOI: How many times in the past year have you...    Used an illegal drug or used a prescription medication for non-medical reasons? Never Filed at: 10/29/2024 1152                            History of Present Illness       Chief Complaint   Patient presents with    Pain     Chronic pain related to cancer; pt had radiation treatment yesterday. Swelling noted to neck; pt states it is from rad treatment. Pt states pain with swallowing        Past Medical History:   Diagnosis Date    Anemia     Arthritis     Body mass index (BMI) 40.0-44.9, adult (HCC)     Breast cancer (HCC) 12/17/2018    Cancer (HCC)     right breast, colon, liver, right tonsil    Cervical lymphadenopathy     CT neck on 3/30/2023- Large right level 2A lymphadenopathy as described above and suspicious for neoplasm.  Correlation with histopathology is recommended.  Mild asymmetric prominence of the right palatine tonsil with otherwise no definitive nodular enhancing lesions identified along the course of the aerodigestive tract.     5/26/23- FNA of this node was nonrevealing for tissue etiology, but it d    Encounter for screening for HIV 07/07/2022    Follow-up examination 04/04/2023    GERD (gastroesophageal reflux disease)     Hyperlipidemia     Hypertension     Obesity     Stroke (HCC)     TIA     Stroke (HCC)     TIA     Transaminitis 09/22/2021    Vasomotor rhinitis     Refilled flonase today. Stopped taking since January 2022      Past Surgical History:   Procedure Laterality Date    BREAST BIOPSY Right 11/23/2018    us guided bx cancer    BREAST SURGERY      COLONOSCOPY      ESOPHAGOSCOPY N/A 07/20/2023    Procedure: ESOPHAGOSCOPY;  Surgeon: David Chapa MD;  [facility];  Service: ENT     HEMICOLOECTOMY W/ ANASTOMOSIS Right 3/12/2024    Procedure: RIGHT COLECTOMY WITH ROBOTICS;  Surgeon: Vickie Israel MD;  [facility];  Service: Surgical Oncology    IR BIOPSY LIVER MASS  2023    IR CRYOABLATION  6/3/2024    IR GASTROSTOMY TUBE PLACEMENT  10/2/2024    IR PORT CHECK  2024    IR PORT PLACEMENT  2023    IR PORT STRIPPING  2024    LAPAROTOMY N/A 3/12/2024    Procedure: LAPAROTOMY EXPLORATORY W/ ROBOTICS;  Surgeon: Vickie Israel MD;  [facility];  Service: Surgical Oncology    NC BIOPSY NASOPHARYNX VISIBLE LESION SIMPLE N/A 10/9/2024    Procedure: NASOPHARYNGOSCOPY with biospy;  Surgeon: Juanito Matthew MD;  [facility];  Service: ENT    [facility] W/CELL WASHG SPX N/A 2023    Procedure: BRONCHOSCOPY;  Surgeon: David Chapa MD;  [facility];  Service: ENT    NC LARYNGOSCOPY W/BIOPSY MICROSCOPE/TELESCOPE N/A 2023    Procedure: MICRODIRECT LARYNGOSCOPY WITH BIOPSY;  Surgeon: David Chapa MD;  [facility];  Service: ENT    NC LARYNGOSCOPY W/WO TRACHEOSCOPY DX EXCEPT  N/A 10/9/2024    Procedure: DIRECT LARYNGOSCOPY;  Surgeon: Juanito Matthew MD;  [facility];  Service: ENT    NC MASTECTOMY PARTIAL W/AXILLARY LYMPHADENECTOMY Right 2018    Procedure: ULTRASOUND LOCALIZED PARTIAL MASTECTOMY W/SENTINEL NODE BIOPSY POSS AXILLARY DISSECTION;  Surgeon: Zahida Coronado MD;  [facility];  Service: General    NC TONSILLECTOMY PRIMARY/SECONDARY AGE 12/> N/A 10/9/2024    Procedure: TONSILLECTOMY;  Surgeon: Juanito Matthew MD;  [facility];  Service: ENT    TUBAL LIGATION      US GUIDED BREAST BIOPSY RIGHT COMPLETE Right 2018    US GUIDED INJECTION FOR RESEARCH STUDY  2018    US GUIDED INJECTION FOR RESEARCH STUDY  2018    US GUIDED INJECTION FOR RESEARCH STUDY  2019    US GUIDED LYMPH NODE BIOPSY RIGHT  2023      Family  History   Problem Relation Age of Onset    Colon cancer Mother 58    Hypertension Mother     Pancreatic cancer Father 60    Hypertension Daughter     Hypertension Son       Social History     Tobacco Use    Smoking status: Never     Passive exposure: Never    Smokeless tobacco: Never    Tobacco comments:     NO TOBACCO USE   Vaping Use    Vaping status: Never Used   Substance Use Topics    Alcohol use: Never    Drug use: Never      E-Cigarette/Vaping    E-Cigarette Use Never User       E-Cigarette/Vaping Substances    Nicotine No     THC No     CBD No     Flavoring No     Other No     Unknown No       I have reviewed and agree with the history as documented.     HPI    Review of Systems        Objective       ED Triage Vitals   Temperature Pulse Blood Pressure Respirations SpO2 Patient Position - Orthostatic VS   10/29/24 1151 10/29/24 1151 10/29/24 1151 10/29/24 1151 10/29/24 1151 10/29/24 2352   97.9 °F (36.6 °C) 99 163/83 18 97 % Lying      Temp Source Heart Rate Source BP Location FiO2 (%) Pain Score    10/29/24 2352 10/29/24 1151 10/29/24 2352 -- 10/29/24 1345    Oral Monitor Left arm  9      Vitals      Date and Time Temp Pulse SpO2 Resp BP Pain Score FACES Pain Rating User   11/01/24 1142 -- 93 99 % -- 184/92 -- -- DII   11/01/24 1142 -- 93 97 % -- 184/92 -- -- DII   11/01/24 0902 -- -- -- -- -- 7 -- JA   11/01/24 0900 -- -- -- -- -- No Pain -- NM   11/01/24 0715 98 °F (36.7 °C) 82 98 % 16 183/91 -- -- DII   11/01/24 0623 -- -- -- -- -- 6 -- KO   10/31/24 2304 -- 92 98 % -- 169/94 4 -- KO   10/31/24 2211 99.6 °F (37.6 °C) 85 97 % -- 184/85 -- -- DII   10/31/24 2109 -- -- -- -- -- 7 -- KO   10/31/24 1511 99 °F (37.2 °C) 108 97 % -- 165/107 -- -- DII   10/31/24 1316 -- -- -- -- -- 7 -- AO   10/31/24 0729 98 °F (36.7 °C) 86 97 % -- 168/88 -- -- DII   10/30/24 2210 99.1 °F (37.3 °C) -- -- 18 -- -- -- AMG   10/30/24 2153 -- -- -- -- -- 7 -- AMG   10/30/24 2145 99 °F (37.2 °C) -- -- 18 167/95 -- -- AMG   10/30/24  2018 -- -- -- 18 -- -- -- AMG   10/30/24 2003 99.3 °F (37.4 °C) 97 -- 18 148/77 -- -- AMG   10/30/24 1953 99.3 °F (37.4 °C) -- -- 18 148/77 -- -- AMG   10/30/24 1948 99.4 °F (37.4 °C) -- -- 18 -- -- -- AMG   10/30/24 1931 99.4 °F (37.4 °C) 88 -- 18 161/84 -- -- AMG   10/30/24 1807 -- -- -- -- -- 7 -- KS   10/30/24 1607 99.2 °F (37.3 °C) 103 98 % 18 155/88 -- -- DII   10/30/24 1540 98.2 °F (36.8 °C) 91 96 % 16 150/90 -- -- DII   10/30/24 1434 99.3 °F (37.4 °C) 96 93 % 18 139/67 -- -- DII   10/30/24 1405 99.8 °F (37.7 °C) 108 100 % -- 164/95 -- -- DII   10/30/24 1339 99.3 °F (37.4 °C) 101 98 % 18 169/94 -- -- DII   10/30/24 1333 -- -- -- -- -- 7 -- [place]   10/30/24 1326 100.3 °F (37.9 °C) 112 100 % -- 149/77 -- -- DII   10/30/24 1249 100.4 °F (38 °C) 111 98 % 16 149/73 -- -- DII   10/30/24 1008 -- -- -- -- -- 8 -- [place]   10/30/24 0820 99 °F (37.2 °C) 105 99 % 18 159/94 -- -- DII   10/30/24 0339 98.8 °F (37.1 °C) 103 95 % 16 156/92 -- -- DII   10/29/24 2352 -- -- -- 16 -- -- -- SW   10/29/24 2352 98.3 °F (36.8 °C) 103 97 % -- 116/68 -- -- DII   10/29/24 2352 98.3 °F (36.8 °C) 102 96 % -- 116/68 -- -- DII   10/29/24 2301 98.3 °F (36.8 °C) 105 96 % -- -- -- -- DII   10/29/24 2218 -- -- -- -- -- -- 2 SW   10/29/24 2212 -- -- -- -- -- 8 -- SW   10/29/24 2138 -- 111 98 % -- -- -- -- SW   10/29/24 2100 98.3 °F (36.8 °C) -- -- -- 135/75 -- -- SW   10/29/24 2059 98.3 °F (36.8 °C) -- -- 22 135/75 -- -- DII   10/29/24 2045 -- -- -- -- -- 8 -- SW   10/29/24 1943 -- -- -- -- -- 8 -- LML   10/29/24 1800 -- 89 98 % 18 141/65 -- -- LML   10/29/24 1345 -- 98 98 % 17 171/86 9 -- LML   10/29/24 1151 97.9 °F (36.6 °C) 99 97 % 18 163/83 -- -- JR            Physical Exam    Results Reviewed       Procedure Component Value Units Date/Time    Blood culture [[id number]] Collected: 10/29/24 2328    Lab Status: Preliminary result Specimen: Blood from Arm, Left Updated: 11/01/24 1101     Blood Culture No Growth at 48 hrs.    Blood culture [[id number]]  Collected: 10/29/24 1954    Lab Status: Preliminary result Specimen: Blood from Arm, Right Updated: 10/31/24 2301     Blood Culture No Growth at 48 hrs.    Procalcitonin, Next Day AM Collection [[id number]]  (Normal) Collected: 10/30/24 0434    Lab Status: Final result Specimen: Blood from Line Updated: 10/30/24 0526     Procalcitonin 0.17 ng/ml     Comprehensive metabolic panel [[id number]]  (Abnormal) Collected: 10/30/24 0434    Lab Status: Final result Specimen: Blood from Line Updated: 10/30/24 0522     Sodium 142 mmol/L      Potassium 3.8 mmol/L      Chloride 104 mmol/L      CO2 30 mmol/L      ANION GAP 8 mmol/L      BUN 26 mg/dL      Creatinine 1.04 mg/dL      Glucose 102 mg/dL      Glucose, Fasting 102 mg/dL      Calcium 8.2 mg/dL      Corrected Calcium 9.2 mg/dL      AST 23 U/L      ALT 22 U/L      Alkaline Phosphatase 103 U/L      Total Protein 6.3 g/dL      Albumin 2.8 g/dL      Total Bilirubin 0.34 mg/dL      eGFR 55 ml/min/1.73sq m     Narrative:      National Kid[employer] Disease Foundation guidelines for Chronic Kidney Disease (CKD):     Stage 1 with normal or high GFR (GFR > 90 mL/min/1.73 square meters)    Stage 2 Mild CKD (GFR = 60-89 mL/min/1.73 square meters)    Stage 3A Moderate CKD (GFR = 45-59 mL/min/1.73 square meters)    Stage 3B Moderate CKD (GFR = 30-44 mL/min/1.73 square meters)    Stage 4 Severe CKD (GFR = 15-29 mL/min/1.73 square meters)    Stage 5 End Stage CKD (GFR <15 mL/min/1.73 square meters)  Note: GFR calculation is accurate only with a steady state creatinine    Magnesium [[id number]]  (Abnormal) Collected: 10/30/24 0434    Lab Status: Final result Specimen: Blood from Line Updated: 10/30/24 0522     Magnesium 1.4 mg/dL     Phosphorus [[id number]]  (Normal) Collected: 10/30/24 0434    Lab Status: Final result Specimen: Blood from Line Updated: 10/30/24 0522     Phosphorus 3.3 mg/dL     CBC and differential [[id number]]  (Abnormal) Collected: 10/30/24 0434    Lab Status: Final result  Specimen: Blood from Line Updated: 10/30/24 0446     WBC 1.76 Thousand/uL      RBC 2.17 Million/uL      Hemoglobin 6.2 g/dL      Hematocrit 19.1 %      MCV 88 fL      MCH 28.6 pg      MCHC 32.5 g/dL      RDW 17.6 %      MPV 9.3 fL      Platelets 131 Thousands/uL      nRBC 0 /100 WBCs      Segmented % 65 %      Immature Grans % 1 %      Lymphocytes % 15 %      Monocytes % 16 %      Eosinophils Relative 2 %      Basophils Relative 1 %      Absolute Neutrophils 1.16 Thousands/µL      Absolute Immature Grans 0.01 Thousand/uL      Absolute Lymphocytes 0.27 Thousands/µL      Absolute Monocytes 0.28 Thousand/µL      Eosinophils Absolute 0.03 Thousand/µL      Basophils Absolute 0.01 Thousands/µL     COVID/FLU/RSV [[id number]]  (Normal) Collected: 10/29/24 1948    Lab Status: Final result Specimen: Nares from Nose Updated: 10/29/24 2139     SARS-CoV-2 Negative     INFLUENZA A PCR Negative     INFLUENZA B PCR Negative     RSV PCR Negative    Narrative:      This test has been performed using the CoV-2/Flu/RSV plus assay on the [employer] platform. This test has been validated by the  and verified by the performing laboratory.     This test is designed to amplify and detect the following: nucleocapsid (N), envelope (E), and RNA-dependent RNA polymerase (RdRP) genes of the SARS-CoV-2 genome; matrix (M), basic polymerase (PB2), and acidic protein (PA) segments of the influenza A genome; matrix (M) and non-structural protein (NS) segments of the influenza B genome, and the nucleocapsid genes of RSV A and RSV B.     Positive results are indicative of the presence of Flu A, Flu B, RSV, and/or SARS-CoV-2 RNA. Positive results for SARS-CoV-2 or suspected novel influenza should be reported to state, local, or federal health departments according to local reporting requirements.      All results should be assessed in conjunction with clinical presentation and other laboratory markers for clinical management.     FOR  PEDIATRIC PATIENTS - copy/paste COVID Guidelines URL to browser: [email or web address]       Prealbumin [[id number]]  (Abnormal) Collected: 10/29/24 1954    Lab Status: Final result Specimen: Blood from Arm, Right Updated: 10/29/24 2046     Prealbumin 14.9 mg/dL     Lactic acid, plasma (w/reflex if result > 2.0) [[id number]]  (Normal) Collected: 10/29/24 1954    Lab Status: Final result Specimen: Blood from Arm, Right Updated: 10/29/24 2026     LACTIC ACID 1.3 mmol/L     Narrative:      Result may be elevated if tourniquet was used during collection.    Platelet count [[id number]]  (Abnormal) Collected: 10/29/24 1954    Lab Status: Final result Specimen: Blood from Arm, Right Updated: 10/29/24 2011     Platelets 147 Thousands/uL      MPV 9.9 fL     Procalcitonin [[id number]]  (Normal) Collected: 10/29/24 1319    Lab Status: Final result Specimen: Blood from Line, Venous Updated: 10/29/24 1824     Procalcitonin 0.14 ng/ml     UA (URINE) with reflex to Scope [[id number]]     Lab Status: No result Specimen: Urine     Comprehensive metabolic panel [[id number]]  (Abnormal) Collected: 10/29/24 1319    Lab Status: Final result Specimen: Blood from Line, Venous Updated: 10/29/24 1353     Sodium 143 mmol/L      Potassium 4.0 mmol/L      Chloride 105 mmol/L      CO2 31 mmol/L      ANION GAP 7 mmol/L      BUN 32 mg/dL      Creatinine 1.00 mg/dL      Glucose 102 mg/dL      Calcium 8.9 mg/dL      Corrected Calcium 9.5 mg/dL      AST 21 U/L      ALT 22 U/L      Alkaline Phosphatase 105 U/L      Total Protein 6.9 g/dL      Albumin 3.2 g/dL      Total Bilirubin 0.33 mg/dL      eGFR 58 ml/min/1.73sq m     Narrative:      National Kidney Disease Foundation guidelines for Chronic Kidney Disease (CKD):     Stage 1 with normal or high GFR (GFR > 90 mL/min/1.73 square meters)    Stage 2 Mild CKD (GFR = 60-89 mL/min/1.73 square meters)    Stage 3A Moderate CKD (GFR = 45-59 mL/min/1.73 square  meters)    Stage 3B Moderate CKD (GFR = 30-44 mL/min/1.73 square meters)    Stage 4 Severe CKD (GFR = 15-29 mL/min/1.73 square meters)    Stage 5 End Stage CKD (GFR <15 mL/min/1.73 square meters)  Note: GFR calculation is accurate only with a steady state creatinine    CBC and differential [[id number]]  (Abnormal) Collected: 10/29/24 1319    Lab Status: Final result Specimen: Blood from Line, Venous Updated: 10/29/24 1331     WBC 1.53 Thousand/uL      RBC 2.64 Million/uL      Hemoglobin 7.6 g/dL      Hematocrit 23.1 %      MCV 88 fL      MCH 28.8 pg      MCHC 32.9 g/dL      RDW 17.6 %      MPV 9.9 fL      Platelets 133 Thousands/uL      nRBC 0 /100 WBCs      Segmented % 70 %      Immature Grans % 0 %      Lymphocytes % 12 %      Monocytes % 16 %      Eosinophils Relative 1 %      Basophils Relative 1 %      Absolute Neutrophils 1.07 Thousands/µL      Absolute Immature Grans 0.00 Thousand/uL      Absolute Lymphocytes 0.19 Thousands/µL      Absolute Monocytes 0.24 Thousand/µL      Eosinophils Absolute 0.02 Thousand/µL      Basophils Absolute 0.01 Thousands/µL             XR chest pa and lateral   Final Interpretation by Estefani Pacheco MD (10/30 1616)      No acute cardiopulmonary disease.            Workstation performed: [id number]         CT soft tissue neck with contrast   Final Interpretation by Rad Howard MD (10/29 1517)      Posttreatment changes as described without evidence for disease progression compared to PET/CT from 8/5/2024.      No acute abnormality.      Workstation performed: [id number]             Procedures    ED Medication and Procedure Management   Prior to Admission Medications   Prescriptions Last Dose Informant Patient Reported? Taking?   acetaminophen (TYLENOL) 160 mg/5 mL liquid   No No   Sig: Take 20 mL (640 mg total) by mouth every 6 (six) hours as needed for mild pain for up to 10 days   anastrozole (ARIMIDEX) 1 mg tablet  Self No No   Sig: Take 1 tablet (1 mg total) by mouth  daily   atorvastatin (LIPITOR) 40 mg tablet   No No   Sig: Take 1 tablet (40 mg total) by mouth daily at bedtime   diphenhydramine, lidocaine, Al/Mg hydroxide, simethicone (Magic Mouthwash) SUSP   No No   Sig: Swish and swallow 10 mL every 4 (four) hours as needed for mouth pain or discomfort   docusate sodium (COLACE) 50 mg capsule  Self No No   Sig: Take 1 capsule (50 mg total) by mouth 2 (two) times a day   ergocalciferol (VITAMIN D2) 50,000 units  Self No No   Sig: Take 1 capsule (50,000 Units total) by mouth once a week   folic acid (FOLVITE) 1 mg tablet  Self No No   Sig: Take 1 tablet (1 mg total) by mouth in the morning   gabapentin (NEURONTIN) 300 mg capsule   No No   Sig: Take 1 pills in the morning, 1 pill at lunch and 2 pills before bed   hydrocortisone 1 % ointment  Self Yes No   ibuprofen (MOTRIN) 100 mg/5 mL suspension   No No   Sig: Take 20 mL (400 mg total) by mouth every 6 (six) hours as needed for moderate pain   lidocaine-prilocaine (EMLA) cream  Self No No   Sig: Apply topically as needed for mild pain   losartan (COZAAR) 50 mg tablet   No No   Sig: Take 1 tablet (50 mg total) by mouth daily   mirtazapine (REMERON) 15 mg tablet   No No   Sig: Take 1 tablet (15 mg total) by mouth daily at bedtime Patient is also on a 30 mg tablet, for a total dose of 45 mg   mirtazapine (REMERON) 30 mg tablet   No No   Sig: Take 1 tablet (30 mg total) by mouth daily at bedtime   omeprazole (PriLOSEC) 20 mg delayed release capsule   No No   Sig: Take 1 capsule (20 mg total) by mouth daily   ondansetron (ZOFRAN) 8 mg tablet   No No   Sig: Take 1 tablet (8 mg total) by mouth every 8 (eight) hours as needed for nausea or vomiting   oxyCODONE (ROXICODONE) 5 mg/5 mL solution   No No   Sig: Take 5 mL (5 mg total) by mouth every 6 (six) hours as needed for severe pain ((breakthrough pain)) Max Daily Amount: 20 mg   potassium chloride (Klor-Con M20) 20 mEq tablet   No No   Sig: Take 1 tablet (20 mEq total) by mouth 2  (two) times a day   prochlorperazine (COMPAZINE) 10 mg tablet   No No   Sig: Take 1 tablet (10 mg total) by mouth every 6 (six) hours as needed for nausea or vomiting   pyridoxine (VITAMIN B6) 50 mg tablet   No No   Sig: Take 1 tablet (50 mg total) by mouth daily   vitamin B-12 (VITAMIN B-12) 1,000 mcg tablet   Yes No      Facility-Administered Medications: None     Current Discharge Medication List        CONTINUE these medications which have NOT CHANGED    Details   acetaminophen (TYLENOL) 160 mg/5 mL liquid Take 20 mL (640 mg total) by mouth every 6 (six) hours as needed for mild pain for up to 10 days  Qty: 473 mL, Refills: 0    Associated Diagnoses: Status post tonsillectomy      anastrozole (ARIMIDEX) 1 mg tablet Take 1 tablet (1 mg total) by mouth daily  Qty: 90 tablet, Refills: 3    Associated Diagnoses: Malignant neoplasm of upper-outer quadrant of right breast in female, estrogen receptor positive (HCC)      atorvastatin (LIPITOR) 40 mg tablet Take 1 tablet (40 mg total) by mouth daily at bedtime  Qty: 30 tablet, Refills: 2    Associated Diagnoses: Mixed hyperlipidemia      diphenhydramine, lidocaine, Al/Mg hydroxide, simethicone (Magic Mouthwash) SUSP Swish and swallow 10 mL every 4 (four) hours as needed for mouth pain or discomfort  Qty: 480 mL, Refills: 1    Associated Diagnoses: 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HCC)      docusate sodium (COLACE) 50 mg capsule Take 1 capsule (50 mg total) by mouth 2 (two) times a day  Qty: 90 capsule, Refills: 1    Associated Diagnoses: Metastatic colon cancer to liver (HCC); Drug-induced constipation      ergocalciferol (VITAMIN D2) 50,000 units Take 1 capsule (50,000 Units total) by mouth once a week  Qty: 90 capsule, Refills: 3    Associated Diagnoses: Vitamin D deficiency      folic acid (FOLVITE) 1 mg tablet Take 1 tablet (1 mg total) by mouth in the morning  Qty: 90 tablet, Refills: 3    Associated Diagnoses: Metastatic colon cancer to liver (HCC)       gabapentin (NEURONTIN) 300 mg capsule Take 1 pills in the morning, 1 pill at lunch and 2 pills before bed  Qty: 120 capsule, Refills: 3    Associated Diagnoses: Metastatic colon cancer to liver (HCC)      hydrocortisone 1 % ointment       ibuprofen (MOTRIN) 100 mg/5 mL suspension Take 20 mL (400 mg total) by mouth every 6 (six) hours as needed for moderate pain  Qty: 473 mL, Refills: 0    Associated Diagnoses: Status post tonsillectomy      lidocaine-prilocaine (EMLA) cream Apply topically as needed for mild pain  Qty: 30 g, Refills: 3    Associated Diagnoses: Metastatic colon cancer to liver (HCC)      losartan (COZAAR) 50 mg tablet Take 1 tablet (50 mg total) by mouth daily  Qty: 90 tablet, Refills: 5    Associated Diagnoses: Primary hypertension      !! mirtazapine (REMERON) 15 mg tablet Take 1 tablet (15 mg total) by mouth daily at bedtime Patient is also on a 30 mg tablet, for a total dose of 45 mg  Qty: 30 tablet, Refills: 3    Associated Diagnoses: Metastatic colon cancer to liver (HCC)      !! mirtazapine (REMERON) 30 mg tablet Take 1 tablet (30 mg total) by mouth daily at bedtime  Qty: 30 tablet, Refills: 3    Associated Diagnoses: Metastatic colon cancer to liver (HCC)      omeprazole (PriLOSEC) 20 mg delayed release capsule Take 1 capsule (20 mg total) by mouth daily  Qty: 30 capsule, Refills: 5    Associated Diagnoses: Abnormal PET scan of colon      ondansetron (ZOFRAN) 8 mg tablet Take 1 tablet (8 mg total) by mouth every 8 (eight) hours as needed for nausea or vomiting  Qty: 90 tablet, Refills: 2    Associated Diagnoses: Metastatic colon cancer to liver (HCC)      oxyCODONE (ROXICODONE) 5 mg/5 mL solution Take 5 mL (5 mg total) by mouth every 6 (six) hours as needed for severe pain ((breakthrough pain)) Max Daily Amount: 20 mg  Qty: 473 mL, Refills: 0    Associated Diagnoses: Status post tonsillectomy      potassium chloride (Klor-Con M20) 20 mEq tablet Take 1 tablet (20 mEq total) by mouth 2 (two)  times a day  Qty: 90 tablet, Refills: 1    Associated Diagnoses: Hypokalemia      prochlorperazine (COMPAZINE) 10 mg tablet Take 1 tablet (10 mg total) by mouth every 6 (six) hours as needed for nausea or vomiting  Qty: 90 tablet, Refills: 2    Associated Diagnoses: Metastatic colon cancer to liver (HCC)      pyridoxine (VITAMIN B6) 50 mg tablet Take 1 tablet (50 mg total) by mouth daily  Qty: 90 tablet, Refills: 3    Associated Diagnoses: Neuropathy      vitamin B-12 (VITAMIN B-12) 1,000 mcg tablet        !! - Potential duplicate medications found. Please discuss with provider.        No discharge procedures on file.  ED SEPSIS DOCUMENTATION   Time reflects when diagnosis was documented in both MDM as applicable and the Disposition within this note       Time User Action Codes Description Comment    10/29/2024  4:12 PM Kimberly George Add [G89.3] Cancer associated pain     10/29/2024  5:10 PM Conor Herr Add [Z93.1] S/P percutaneous endoscopic gastrostomy (PEG) tube placement (HCC)     10/29/2024  5:10 PM Conor Herr Add [Z78.9] On tube feeding diet     10/29/2024  5:27 PM Conor Herr Add [R53.83] Fatigue, unspecified type     10/29/2024  5:53 PM Conor Herr Add [R09.89] Sensation, choking     10/30/2024  8:15 AM Conor Herr Add [D70.1,  T45.1X5A] Chemotherapy induced neutropenia (HCC)     10/30/2024  8:15 AM Conor Herr Add [C09.9] 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HCC)     10/30/2024 11:59 AM Carole Newman Add [R53.0] Neoplastic malignant related fatigue     10/30/2024 11:59 AM Carole Newman Add [C18.9,  C78.7] Metastatic colon cancer to liver (HCC)                  Kimberly George DO  11/01/24 1306

## 2024-10-29 NOTE — ASSESSMENT & PLAN NOTE
Lab Results   Component Value Date    EGFR 65 11/02/2024    EGFR 60 11/01/2024    EGFR 60 10/31/2024    CREATININE 0.90 11/02/2024    CREATININE 0.97 11/01/2024    CREATININE 0.96 10/31/2024     - Maintain adequate hydration  - Avoid nephrotoxic medications

## 2024-10-29 NOTE — CONSULTS
Consultation - [facility]/ENT   Maira Moura 69 y.o. female MRN: [id number]  Unit/Bed#: ED 24 Encounter: [id number]        Assessment: 3 weeks s/p direct laryngoscopy, bilateral tonsillectomy and nasopharynx biopsy in the setting of known right tonsil p16+ SCCA ([id number]). Completed 6 chemotherapy cycles (Cisplatin) and radiation therapy, patient noted to be weak and ill-appearing with fatigue, uncontrolled pain and odynophagia.     Biopsy report from 10/9/24:  Final Diagnosis   A. NASOPHARYNX / OROPHARYNX, BIOPSY:    - Chronic rhinosinusitis.    - Negative for dysplasia or malignancy; see note.     B. TONSIL, RIGHT, TONSILLECTOMY:    - Reactive tonsillar tissue; see note.    - Negative for dysplasia or malignancy.     C. TONSIL, LEFT, TONSILLECTOMY:    - Reactive tonsillar tissue; see note.    - Negative for dysplasia or malignancy.         Plan:   - No acute surgical intervention from ENT stand point at this time  - Pain meds as per primary team  - Throat pain most likely from chemoRT and also to some extent with recent tonsillectomy  - Will discuss further plan with the team and provide further recs for management  - Diet: unable to tolerate PO, continue nutrition via PEG tube         History of Present Illness   Physician Requesting Consult: Neena Patricia MD  Reason for Consult / Principal Problem: Weakness and throat pain  HPI: Maira Moura is a 69 y.o. 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  The patient presented to heme oncology office today with complaint of weakness, throat pain prompting referral to ED for inpatient admission.  The patient also reports of choking sensation which is progressing over  the past few weeks.    Review of systems:  10 Point ROS was performed and negative except as above or otherwise noted in the medical record.    Historical Information   Past Medical History:   Diagnosis Date    Anemia     Arthritis     Body mass index (BMI) 40.0-44.9, adult (HCC)     Breast cancer (HCC) 12/17/2018    Cancer (HCC)     right breast, colon, liver, right tonsil    Cervical lymphadenopathy     CT neck on 3/30/2023- Large right level 2A lymphadenopathy as described above and suspicious for neoplasm.  Correlation with histopathology is recommended.  Mild asymmetric prominence of the right palatine tonsil with otherwise no definitive nodular enhancing lesions identified along the course of the aerodigestive tract.     5/26/23- FNA of this node was nonrevealing for tissue etiology, but it d    Encounter for screening for HIV 07/07/2022    Follow-up examination 04/04/2023    GERD (gastroesophageal reflux disease)     Hyperlipidemia     Hypertension     Obesity     Stroke (HCC)     TIA     Stroke (HCC)     TIA     Transaminitis 09/22/2021    Vasomotor rhinitis     Refilled flonase today. Stopped taking since January 2022     Past Surgical History:   Procedure Laterality Date    BREAST BIOPSY Right 11/23/2018    us guided bx cancer    BREAST SURGERY      COLONOSCOPY      ESOPHAGOSCOPY N/A 07/20/2023    Procedure: ESOPHAGOSCOPY;  Surgeon: David Chapa MD;  [facility];  Service: ENT    HEMICOLOECTOMY W/ ANASTOMOSIS Right 3/12/2024    Procedure: RIGHT COLECTOMY WITH ROBOTICS;  Surgeon: Vickie Israel MD;  [facility];  Service: Surgical Oncology    IR BIOPSY LIVER MASS  06/27/2023    IR CRYOABLATION  6/3/2024    IR GASTROSTOMY TUBE PLACEMENT  10/2/2024    IR PORT CHECK  01/03/2024    IR PORT PLACEMENT  07/28/2023    IR PORT STRIPPING  01/09/2024    LAPAROTOMY N/A 3/12/2024    Procedure: LAPAROTOMY EXPLORATORY W/ ROBOTICS;  Surgeon: Vickie Israel MD;  [facility];  Service:  Surgical Oncology    [place] BIOPSY NASOPHARYNX VISIBLE LESION SIMPLE N/A 10/9/2024    Procedure: NASOPHARYNGOSCOPY with biospy;  Surgeon: Juanito Matthew MD;  [facility];  Service: ENT    [facility] INCL FLUOR GDNCE DX W/CELL WASHG SPX N/A 2023    Procedure: BRONCHOSCOPY;  Surgeon: David Chapa MD;  [facility];  Service: ENT    WV LARYNGOSCOPY W/BIOPSY MICROSCOPE/TELESCOPE N/A 2023    Procedure: MICRODIRECT LARYNGOSCOPY WITH BIOPSY;  Surgeon: David Chapa MD;  [facility];  Service: ENT    [place] LARYNGOSCOPY W/WO TRACHEOSCOPY DX EXCEPT  N/A 10/9/2024    Procedure: DIRECT LARYNGOSCOPY;  Surgeon: Juanito Matthew MD;  [facility];  Service: ENT    [facility] MASTECTOMY PARTIAL W/AXILLARY LYMPHADENECTOMY Right 2018    Procedure: ULTRASOUND LOCALIZED PARTIAL MASTECTOMY W/SENTINEL NODE BIOPSY POSS AXILLARY DISSECTION;  Surgeon: Zahida Coronado MD;  Location: [facility];  Service: General    WV TONSILLECTOMY PRIMARY/SECONDARY AGE 12/> N/A 10/9/2024    Procedure: TONSILLECTOMY;  Surgeon: Juanito Matthew MD;  [facility];  Service: ENT    TUBAL LIGATION      US GUIDED BREAST BIOPSY RIGHT COMPLETE Right 2018    US GUIDED INJECTION FOR RESEARCH STUDY  2018    US GUIDED INJECTION FOR RESEARCH STUDY  2018    US GUIDED INJECTION FOR RESEARCH STUDY  2019    US GUIDED LYMPH NODE BIOPSY RIGHT  2023     Social History   Social History     Substance and Sexual Activity   Alcohol Use Never     Social History     Substance and Sexual Activity   Drug Use Never     Social History     Tobacco Use   Smoking Status Never    Passive exposure: Never   Smokeless Tobacco Never   Tobacco Comments    NO TOBACCO USE     Family History: Family history non-contributory    Meds/Allergies   all current active meds have been reviewed    No Known Allergies    Objective     Vitals:    10/29/24 1800   BP: 141/65   Pulse: 89   Resp: 18   Temp:     SpO2: 98%       Physical Exam   Constitutional: Oriented to person, place, and time. Well-developed and well-nourished, appears in discomfort. Cooperative, able to hear and answer questions without difficulty.    Voice: Muffled.   Head: Normocephalic, atraumatic.  No scars, masses or lesions.  Face: Symmetric, no edema, no sinus tenderness.  Eyes: Vision grossly intact, extra-ocular movement intact.  Ears: External ears normal.  No post-auricular erythema or tenderness.  Nose: Septum intact, nares clear.  Mucosa moist, turbinates well appearing.  No crusting, polyps or discharge evident.  Oral cavity: Dentition intact.  Mucosa moist, lips without lesions or masses.  Tongue mobile, floor of mouth soft and flat.  Hard palate intact.  No masses or lesions.   Oropharynx: Uvula is midline and edematous, soft palate intact without lesion or mass.  Oropharyngeal inlet without obstruction.  Tonsillectomy sites hemostatic and healing under normal course. Posterior pharyngeal wall clear. No masses or lesions.  Salivary glands:  Parotid glands and submandibular glands symmetric, no enlargement or tenderness.  Neck: Normal laryngeal elevation with swallow.  Trachea midline.  No masses or lesions. No palpable adenopathy.  Thyroid: Without tenderness or palpable nodules.    No intake or output data in the 24 hours ending 10/29/24 1845    Invasive Devices       Central Venous Catheter Line  Duration             Port A Cath 09/24/24 Right Chest 35 days              Drain  Duration             Gastrostomy/Enterostomy Percutaneous Interventional Gastrostomy 18 Fr. LUQ 27 days                    Lab Results: CBC:   Lab Results   Component Value Date    WBC 1.53 (L) 10/29/2024    HGB 7.6 (L) 10/29/2024    HCT 23.1 (L) 10/29/2024    MCV 88 10/29/2024     (L) 10/29/2024    RBC 2.64 (L) 10/29/2024    MCH 28.8 10/29/2024    MCHC 32.9 10/29/2024    RDW 17.6 (H) 10/29/2024    MPV 9.9 10/29/2024    NRBC 0 10/29/2024   , CMP:   Lab  "Results   Component Value Date    SODIUM 143 10/29/2024    K 4.0 10/29/2024     10/29/2024    CO2 31 10/29/2024    BUN 32 (H) 10/29/2024    CREATININE 1.00 10/29/2024    CALCIUM 8.9 10/29/2024    AST 21 10/29/2024    ALT 22 10/29/2024    ALKPHOS 105 (H) 10/29/2024    EGFR 58 10/29/2024   , Coags: No results found for: \"PT\", \"PTT\", \"INR\"  Imaging Studies: Results Review Statement: I reviewed radiology reports from this admission including: CT Neck.  IMPRESSION:     Posttreatment changes as described without evidence for disease progression compared to PET/CT from 8/5/2024.     No acute abnormality.      MILTON ElS, DMD, MPA, MD   PGY-1  Otorhinolaryngology - Head & Neck Surgery  Please contact ENT Resident Epic Secure Chat Role for any questions or concerns.    ** Please Note: Portions of the record may have been created with voice recognition software. Occasional wrong word or \"sound a like\" substitutions may have occurred due to the inherent limitations of voice recognition software. There may also be notations and random deletions of words or characters from malfunctioning software. Read the chart carefully and recognize, using context, where substitutions/deletions have occurred.**  "

## 2024-10-30 ENCOUNTER — APPOINTMENT (OUTPATIENT)
Dept: RADIATION ONCOLOGY | Facility: CLINIC | Age: 69
End: 2024-10-30
Attending: RADIOLOGY
Payer: MEDICARE

## 2024-10-30 ENCOUNTER — APPOINTMENT (OUTPATIENT)
Dept: RADIOLOGY | Facility: HOSPITAL | Age: 69
DRG: 947 | End: 2024-10-30
Payer: MEDICARE

## 2024-10-30 ENCOUNTER — HOSPITAL ENCOUNTER (OUTPATIENT)
Dept: INFUSION CENTER | Facility: CLINIC | Age: 69
End: 2024-10-30

## 2024-10-30 PROBLEM — E46 MALNUTRITION (HCC): Status: ACTIVE | Noted: 2024-10-30

## 2024-10-30 PROBLEM — R00.0 TACHYCARDIA: Status: ACTIVE | Noted: 2024-10-30

## 2024-10-30 PROBLEM — D61.810 PANCYTOPENIA DUE TO CHEMOTHERAPY (HCC): Status: ACTIVE | Noted: 2024-10-30

## 2024-10-30 LAB
ABO GROUP BLD: NORMAL
ALBUMIN SERPL BCG-MCNC: 2.8 G/DL (ref 3.5–5)
ALP SERPL-CCNC: 103 U/L (ref 34–104)
ALT SERPL W P-5'-P-CCNC: 22 U/L (ref 7–52)
ANION GAP SERPL CALCULATED.3IONS-SCNC: 8 MMOL/L (ref 4–13)
AST SERPL W P-5'-P-CCNC: 23 U/L (ref 13–39)
BASOPHILS # BLD AUTO: 0.01 THOUSANDS/ΜL (ref 0–0.1)
BASOPHILS NFR BLD AUTO: 1 % (ref 0–1)
BILIRUB SERPL-MCNC: 0.34 MG/DL (ref 0.2–1)
BLD GP AB SCN SERPL QL: NEGATIVE
BUN SERPL-MCNC: 26 MG/DL (ref 5–25)
CALCIUM ALBUM COR SERPL-MCNC: 9.2 MG/DL (ref 8.3–10.1)
CALCIUM SERPL-MCNC: 8.2 MG/DL (ref 8.4–10.2)
CHLORIDE SERPL-SCNC: 104 MMOL/L (ref 96–108)
CO2 SERPL-SCNC: 30 MMOL/L (ref 21–32)
CREAT SERPL-MCNC: 1.04 MG/DL (ref 0.6–1.3)
EOSINOPHIL # BLD AUTO: 0.03 THOUSAND/ΜL (ref 0–0.61)
EOSINOPHIL NFR BLD AUTO: 2 % (ref 0–6)
ERYTHROCYTE [DISTWIDTH] IN BLOOD BY AUTOMATED COUNT: 17.6 % (ref 11.6–15.1)
GFR SERPL CREATININE-BSD FRML MDRD: 55 ML/MIN/1.73SQ M
GLUCOSE P FAST SERPL-MCNC: 102 MG/DL (ref 65–99)
GLUCOSE SERPL-MCNC: 102 MG/DL (ref 65–140)
HCT VFR BLD AUTO: 19.1 % (ref 34.8–46.1)
HCT VFR BLD AUTO: 19.2 % (ref 34.8–46.1)
HGB BLD-MCNC: 6.2 G/DL (ref 11.5–15.4)
HGB BLD-MCNC: 6.2 G/DL (ref 11.5–15.4)
IMM GRANULOCYTES # BLD AUTO: 0.01 THOUSAND/UL (ref 0–0.2)
IMM GRANULOCYTES NFR BLD AUTO: 1 % (ref 0–2)
LYMPHOCYTES # BLD AUTO: 0.27 THOUSANDS/ΜL (ref 0.6–4.47)
LYMPHOCYTES NFR BLD AUTO: 15 % (ref 14–44)
MAGNESIUM SERPL-MCNC: 1.4 MG/DL (ref 1.9–2.7)
MCH RBC QN AUTO: 28.6 PG (ref 26.8–34.3)
MCHC RBC AUTO-ENTMCNC: 32.5 G/DL (ref 31.4–37.4)
MCV RBC AUTO: 88 FL (ref 82–98)
MONOCYTES # BLD AUTO: 0.28 THOUSAND/ΜL (ref 0.17–1.22)
MONOCYTES NFR BLD AUTO: 16 % (ref 4–12)
NEUTROPHILS # BLD AUTO: 1.16 THOUSANDS/ΜL (ref 1.85–7.62)
NEUTS SEG NFR BLD AUTO: 65 % (ref 43–75)
NRBC BLD AUTO-RTO: 0 /100 WBCS
PHOSPHATE SERPL-MCNC: 3.3 MG/DL (ref 2.3–4.1)
PLATELET # BLD AUTO: 131 THOUSANDS/UL (ref 149–390)
PMV BLD AUTO: 9.3 FL (ref 8.9–12.7)
POTASSIUM SERPL-SCNC: 3.8 MMOL/L (ref 3.5–5.3)
PROCALCITONIN SERPL-MCNC: 0.17 NG/ML
PROT SERPL-MCNC: 6.3 G/DL (ref 6.4–8.4)
RBC # BLD AUTO: 2.17 MILLION/UL (ref 3.81–5.12)
RH BLD: POSITIVE
SODIUM SERPL-SCNC: 142 MMOL/L (ref 135–147)
SPECIMEN EXPIRATION DATE: NORMAL
WBC # BLD AUTO: 1.76 THOUSAND/UL (ref 4.31–10.16)

## 2024-10-30 PROCEDURE — 99223 1ST HOSP IP/OBS HIGH 75: CPT | Performed by: INTERNAL MEDICINE

## 2024-10-30 PROCEDURE — P9040 RBC LEUKOREDUCED IRRADIATED: HCPCS

## 2024-10-30 PROCEDURE — 86923 COMPATIBILITY TEST ELECTRIC: CPT

## 2024-10-30 PROCEDURE — 86901 BLOOD TYPING SEROLOGIC RH(D): CPT

## 2024-10-30 PROCEDURE — NC001 PR NO CHARGE: Performed by: INTERNAL MEDICINE

## 2024-10-30 PROCEDURE — 71046 X-RAY EXAM CHEST 2 VIEWS: CPT

## 2024-10-30 PROCEDURE — 84145 PROCALCITONIN (PCT): CPT

## 2024-10-30 PROCEDURE — 86900 BLOOD TYPING SEROLOGIC ABO: CPT

## 2024-10-30 PROCEDURE — 80053 COMPREHEN METABOLIC PANEL: CPT

## 2024-10-30 PROCEDURE — 83735 ASSAY OF MAGNESIUM: CPT

## 2024-10-30 PROCEDURE — 99232 SBSQ HOSP IP/OBS MODERATE 35: CPT | Performed by: FAMILY MEDICINE

## 2024-10-30 PROCEDURE — 85025 COMPLETE CBC W/AUTO DIFF WBC: CPT

## 2024-10-30 PROCEDURE — 86850 RBC ANTIBODY SCREEN: CPT

## 2024-10-30 PROCEDURE — 85014 HEMATOCRIT: CPT

## 2024-10-30 PROCEDURE — 85018 HEMOGLOBIN: CPT

## 2024-10-30 PROCEDURE — 84100 ASSAY OF PHOSPHORUS: CPT

## 2024-10-30 PROCEDURE — 30233N1 TRANSFUSION OF NONAUTOLOGOUS RED BLOOD CELLS INTO PERIPHERAL VEIN, PERCUTANEOUS APPROACH: ICD-10-PCS

## 2024-10-30 RX ORDER — SENNOSIDES 8.6 MG
1 TABLET ORAL
Status: CANCELLED | OUTPATIENT
Start: 2024-10-30

## 2024-10-30 RX ORDER — OXYCODONE HYDROCHLORIDE 5 MG/1
5 TABLET ORAL
Status: DISCONTINUED | OUTPATIENT
Start: 2024-10-30 | End: 2024-10-30

## 2024-10-30 RX ORDER — POLYETHYLENE GLYCOL 3350 17 G/17G
17 POWDER, FOR SOLUTION ORAL DAILY
Status: CANCELLED | OUTPATIENT
Start: 2024-10-30

## 2024-10-30 RX ORDER — OXYCODONE HYDROCHLORIDE 5 MG/1
5 TABLET ORAL EVERY 4 HOURS PRN
Status: DISCONTINUED | OUTPATIENT
Start: 2024-10-30 | End: 2024-11-04 | Stop reason: HOSPADM

## 2024-10-30 RX ORDER — OXYCODONE HCL 5 MG/5 ML
5 SOLUTION, ORAL ORAL
Status: DISCONTINUED | OUTPATIENT
Start: 2024-10-30 | End: 2024-10-30

## 2024-10-30 RX ORDER — METRONIDAZOLE 500 MG/1
500 TABLET ORAL EVERY 12 HOURS
Status: DISCONTINUED | OUTPATIENT
Start: 2024-10-30 | End: 2024-11-01

## 2024-10-30 RX ORDER — MAGNESIUM SULFATE HEPTAHYDRATE 40 MG/ML
2 INJECTION, SOLUTION INTRAVENOUS ONCE
Status: DISCONTINUED | OUTPATIENT
Start: 2024-10-30 | End: 2024-10-31

## 2024-10-30 RX ORDER — MAGNESIUM SULFATE HEPTAHYDRATE 40 MG/ML
2 INJECTION, SOLUTION INTRAVENOUS ONCE
Status: COMPLETED | OUTPATIENT
Start: 2024-10-30 | End: 2024-11-01

## 2024-10-30 RX ORDER — ACETAMINOPHEN 160 MG/5ML
650 SUSPENSION ORAL EVERY 6 HOURS SCHEDULED
Status: DISCONTINUED | OUTPATIENT
Start: 2024-10-30 | End: 2024-11-04 | Stop reason: HOSPADM

## 2024-10-30 RX ADMIN — POTASSIUM CHLORIDE 20 MEQ: 1.5 SOLUTION ORAL at 10:09

## 2024-10-30 RX ADMIN — ACETAMINOPHEN 650 MG: 650 SUSPENSION ORAL at 18:07

## 2024-10-30 RX ADMIN — OXYCODONE HYDROCHLORIDE 5 MG: 5 TABLET ORAL at 21:53

## 2024-10-30 RX ADMIN — PYRIDOXINE HCL TAB 50 MG 50 MG: 50 TAB at 10:09

## 2024-10-30 RX ADMIN — POTASSIUM CHLORIDE 20 MEQ: 1.5 SOLUTION ORAL at 18:07

## 2024-10-30 RX ADMIN — LOSARTAN POTASSIUM 50 MG: 50 TABLET, FILM COATED ORAL at 10:09

## 2024-10-30 RX ADMIN — CYANOCOBALAMIN TAB 500 MCG 1000 MCG: 500 TAB at 10:09

## 2024-10-30 RX ADMIN — OXYCODONE HYDROCHLORIDE 5 MG: 5 SOLUTION ORAL at 10:08

## 2024-10-30 RX ADMIN — MIRTAZAPINE 45 MG: 30 TABLET, FILM COATED ORAL at 21:53

## 2024-10-30 RX ADMIN — ANASTROZOLE 1 MG: 1 TABLET, COATED ORAL at 10:09

## 2024-10-30 RX ADMIN — ACETAMINOPHEN 650 MG: 650 SUSPENSION ORAL at 12:29

## 2024-10-30 RX ADMIN — ENOXAPARIN SODIUM 40 MG: 40 INJECTION SUBCUTANEOUS at 10:08

## 2024-10-30 RX ADMIN — PIPERACILLIN SODIUM AND TAZOBACTAM SODIUM 4.5 G: 36; 4.5 INJECTION, POWDER, LYOPHILIZED, FOR SOLUTION INTRAVENOUS at 02:29

## 2024-10-30 RX ADMIN — GABAPENTIN 600 MG: 250 SOLUTION ORAL at 21:54

## 2024-10-30 RX ADMIN — GABAPENTIN 300 MG: 250 SOLUTION ORAL at 10:09

## 2024-10-30 RX ADMIN — ATORVASTATIN CALCIUM 40 MG: 40 TABLET, FILM COATED ORAL at 21:53

## 2024-10-30 RX ADMIN — METRONIDAZOLE 500 MG: 500 TABLET ORAL at 18:07

## 2024-10-30 RX ADMIN — DOCUSATE SODIUM LIQUID 50 MG: 50 LIQUID ORAL at 10:09

## 2024-10-30 RX ADMIN — OXYCODONE HYDROCHLORIDE 5 MG: 5 SOLUTION ORAL at 13:33

## 2024-10-30 RX ADMIN — POLYETHYLENE GLYCOL 3350 17 G: 17 POWDER, FOR SOLUTION ORAL at 10:08

## 2024-10-30 RX ADMIN — CEFTRIAXONE SODIUM 2000 MG: 10 INJECTION, POWDER, FOR SOLUTION INTRAVENOUS at 16:11

## 2024-10-30 RX ADMIN — PANTOPRAZOLE SODIUM 40 MG: 40 INJECTION, POWDER, FOR SOLUTION INTRAVENOUS at 10:09

## 2024-10-30 RX ADMIN — FOLIC ACID 1 MG: 1 TABLET ORAL at 10:09

## 2024-10-30 NOTE — PROGRESS NOTES
"[facility]  Progress Notes - Family [facility]    Maira Moura 1955, 69 y.o. female.  MRN: [id number]  Unit/Bed#: MS [facility]3-01 Encounter: [id number]  Primary Care Provider: Fanta Turner MD      Admission Date: 10/29/2024 1204  Length of Stay: 0 days  Code Status: Level 1 - Full Code  Diet: Diet Enteral/Parenteral; Tube Feeding No Oral Diet; Jevity 1.5; Bolus; 474; (3x/day) - 8:00AM,12:00PM,5:00PM; 60; Before and After each Bolus    Consult:   IP CONSULT TO NUTRITION SERVICES  IP CONSULT TO HEMATOLOGY  IP CONSULT TO PALLIATIVE CARE  IP CONSULT TO ENT    Assessments & Plans:     Disposition: MedSurg    Sensation, choking  Assessment & Plan  -C/o progressively worsening choking sensation for past few weeks, mainly when patient lying down or asleep  -Has not been able to take anything by mouth except small sips of water  -Also has had significant pain around throat  -In the setting of right SCC with recent right tonsillectomy, and recent 6 cycles of cisplatin with 7 cycle held given her symptoms of progressively working worsening \"choking sensation\" and worsening fatigue  -see A&P under \"neoplastic malignant related fatigue\" for more details  -10/29/2024: CT neck soft tissue without contrast: Posttreatment changes as described without evidence for disease progression compared to PET/CT from 8/5/2024. No acute abnormality.    Assessment: Dysphagia, dependent on G-tube for nutrition and medication administration    Plan:  -Home meds to be administered via G-tube  -Continue with home tube feeds  -Magic mouthwash as needed.  -Prealbumin low in the setting of multiple cancers  -Nutrition consulted, appreciate recommendations  -Aspiration precautions, airway precautions    * Neoplastic malignant related fatigue  Assessment & Plan  -69-year-old female with an extensive oncologic history who was a direct admit from oncologic office due to persistent new fatigue.  Of " note, patient recently was started on cisplatin last month for her right tonsillar SCC.  -Brief oncologic history:   -Late 2018 patient was found to have invasive cancer of the right breast; status post partial mastectomy, radiation --has since been on anastrozole  -July 2023: SCC of right tonsil + colorectal cancer with metastasis to the liver.  Briefly, patient had initially presented in 3/2023 per chart review to PCP with complaints of sore throat.  Had imaging and ultimately biopsy done that was concerning for carcinoma.  Eventually got a PET/CT which showed multiple hypermetabolic lesions in chest, abdomen and pelvis.   -Colorectal cancer with mets to liver:   -Had a right hemicolectomy in 3/2024 due to partial obstruction.  Had a cryoablation of liver mets in 6/2024. Chemo.    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  Subsequent tumor board culminated in recommendations for reevaluation by ENT and radiation oncology, as well as definitive chemoradiation treatment.  -In the last 6 weeks, patient has had cisplatin; seventh week has been held by heme-onc due to poor tolerance  -10/2 had gastrostomy tube placed for nutritional support by IR  -10/29 presented to outpatient heme-onc for follow-up.  Has significant fatigue and pain compared to previous visits.  Also complained of choking sensation worsening in the last few weeks.  Subsequently recommended to be transferred for direct admit to [facility] for further evaluation.    -Note: Overnight 10/29 to 10/30 became mildly tachycardic in 100s to 110 again; did note that patient was also mildly tachycardic at 105 in oncology office    Assessment: Worsening fatigue, mild tachycardia, along with  "chemo-induced neutropenia differentials include infection, dehydration, intolerance to chemo    Plan:  -Infectious workup initiated:   -BCx x 2 pending   -UA pending   -CXR pending final read; flu/COVID-negative   -No photophobia, negative Kernig/Brudzinski sign, no other meningeal signs or symptoms thus far; AAOx3.  -Lactic acid, procalcitonin on admission WNL  -Neutropenia precautions  -s/p bolus, continue with IVF  -Closely monitor I/O  -Continue with Zosyn for now  -Monitor WBC, VS, other S/S of infection  -Continue with G tube feeds as noted by outpatient  -Continue with home gabapentin; per chart review has been having bilateral leg paresthesias    -Consult nutrition  -Consult heme-onc inpatient; contacted them last night, aware of patient  -Consult ENT given worsening \" choking\" symptoms and dysphagia  -Consult palliative care  -Continue with anastrozole; checked with pharmacy that tablet is not EC coated   -See A&P under \"choking sensation\"    Chemotherapy induced neutropenia (HCC)  Assessment & Plan  -WBC and ANC in the 1's    Plan:  -Neutropenic precautions  -Continue to monitor WBC, temperature, VS, S/S of infection    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HCC)  Assessment & Plan  -Briefly, right tonsillar SCC first diagnosed in 7/2023  -Of note, interval PET/CT done in 8/2024 showed interval disease progression.  Consensus of follow-up tumor board was for patient to have reevaluation by ENT, radiation oncology, as well as definitive chemoradiation  -Last few weeks completed 6 cycles of cisplatin; 7 cycles stopped in light of recent fatigue/choking sensation worsening  -10/2/2024: G-tube placement  -10/9/2024: Right tonsillectomy  -10/29/2024: CT neck soft tissue without contrast: Posttreatment changes as described without evidence for disease progression compared to PET/CT from 8/5/2024. No acute abnormality.     Plan:  -See A&P under \"neoplastic malignant related fatigue\" and \"sensation " "choking\"  -Consults: Heme-onc, ENT, palliative care, nutrition   -Aspiration and airway precautions  -All feeds and meds through G-tube    S/P percutaneous endoscopic gastrostomy (PEG) tube placement (HCC)  Assessment & Plan  -Placed on 10/2/2024 for nutritional support  -Per chart review:  -TF Goal: 2 containers (474 mL) 3 times daily of Jevity 1.5  -Water Flushin mL free water flush pre/post each bolus (360 mL water) + additional 150 mL water flushes 4 times daily (600 mL water).   -TF provides: 1422 mL total volume (6 cartons), 2133 kcal, 91g protein, 1081 mL free water, 2041 mL total fluid  -Prealbumin on admission low    Assessment: Dependent on PEG tube for nutrition and p.o. med administration due to dysphagia.    Plan:  -N.p.o., continue with home tube feeds and med administration via G-tube  -Continue with home vitamins  -ENT consulted  -Nutrition consulted  -Aspiration/airway precautions    Metastatic colon cancer to liver (HCC)  Assessment & Plan  -Briefly: Diagnosed in 2023 with colorectal cancer with mets to the liver  -Had a right hemicolectomy in 3/2024 due to partial obstruction.  Had a cryoablation of liver mets in 2024. Chemo.  -Per recent oncology OV note, CRC is at least locally advanced versus metastatic and is unresectable.   -Of note, interval PET/CT done in 2024 showed interval disease progression.  Consensus of follow-up tumor board was for patient to have reevaluation by ENT, radiation oncology, as well as definitive chemoradiation     Plan:  -See A&P under \"neoplastic malignant related fatigue\" and \"sensation choking\"  -Consults: Heme-onc, palliative care, nutrition     Malignant neoplasm of right female breast (HCC)  Assessment & Plan  -Briefly: Diagnosed with invasive carcinoma of right breast, status post partial mastectomy and radiation  -Has since been on home anastrozole 1 mg daily    Plan:  -Continue with home anastrozole; check with pharmacy that it is not EC " coated    Stage 3a chronic kidney disease (HCC)  Assessment & Plan  Lab Results   Component Value Date    EGFR 55 10/30/2024    EGFR 58 10/29/2024    EGFR 51 10/28/2024    CREATININE 1.04 10/30/2024    CREATININE 1.00 10/29/2024    CREATININE 1.10 10/28/2024     - Maintain adequate hydration  - Avoid nephrotoxic medications    GERD (gastroesophageal reflux disease)  Assessment & Plan  Home medication: pantoprazole 40 mg daily   Continue home medication    Mixed hyperlipidemia  Assessment & Plan  Home meds: Lipitor 40 mg  Continue home med    Primary hypertension  Assessment & Plan  Home meds: Losartan 50 mg  Systolic (24hrs), Av , Min:116 , Max:171   Diastolic (24hrs), Av, Min:65, Max:92    Plan:  -Continue with home meds          VTE Pharm PPX: Enoxaparin (Lovenox)  VTE Mech PPX: sequential compression device    Discussed with attending physician, Dr. Patricia, and [facility] team.    Subjective:   Overnight, patient was started on Zosyn empirically as she started to become tachycardic and could not rule out infection in the setting of neutropenia.  Was provided fluid bolus and continued on IVF at 125 cc/h.  Also possible that this is due to dehydration/uncontrolled pain.  No other events overnight.    Patient continues to have throat pain.  Denies any photophobia, headaches.  Denies any new symptoms since admission yesterday.     Vitals:     Vitals:    10/29/24 2301 10/29/24 2352 10/29/24 2352 10/30/24 0339   BP:  116/68 116/68 156/92   BP Location:   Left arm    Pulse: 105 102 103 103   Resp:   16 16   Temp: 98.3 °F (36.8 °C) 98.3 °F (36.8 °C) 98.3 °F (36.8 °C) 98.8 °F (37.1 °C)   TempSrc:   Oral    SpO2: 96% 96% 97% 95%     Temp:  [97.7 °F (36.5 °C)-98.8 °F (37.1 °C)] 98.8 °F (37.1 °C)  HR:  [] 103  BP: (116-171)/(65-92) 156/92  Resp:  [16-22] 16  SpO2:  [95 %-98 %] 95 %  O2 Device: None (Room air)  Weight (last 2 days)       None          No intake or output data in the 24 hours ending 10/30/24  0556  Invasive Devices       Central Venous Catheter Line  Duration             Port A Cath 09/24/24 Right Chest 35 days              Drain  Duration             Gastrostomy/Enterostomy Percutaneous Interventional Gastrostomy 18 Fr. LUQ 27 days                    Physical Exam:   Physical Exam  Vitals reviewed.   Constitutional:       General: She is not in acute distress.     Appearance: She is not diaphoretic.   HENT:      Head: Normocephalic and atraumatic.      Nose: Nose normal. No congestion or rhinorrhea.      Mouth/Throat:      Mouth: Mucous membranes are moist.   Eyes:      General: No scleral icterus.        Right eye: No discharge.         Left eye: No discharge.      Extraocular Movements: Extraocular movements intact.      Conjunctiva/sclera: Conjunctivae normal.      Pupils: Pupils are equal, round, and reactive to light.   Cardiovascular:      Rate and Rhythm: Regular rhythm. Tachycardia present.      Pulses: Normal pulses.      Heart sounds: Normal heart sounds. No murmur heard.     No friction rub. No gallop.   Pulmonary:      Effort: Pulmonary effort is normal. No respiratory distress.      Breath sounds: Normal breath sounds. No stridor. No wheezing, rhonchi or rales.      Comments: Significant upper airway transmitted sounds.  Chest:      Chest wall: No tenderness.   Abdominal:      General: Abdomen is flat. There is no distension.      Palpations: Abdomen is soft. There is no mass.      Tenderness: There is no abdominal tenderness. There is no guarding or rebound.      Hernia: No hernia is present.   Musculoskeletal:      Cervical back: Normal range of motion and neck supple. Tenderness present. No rigidity.      Right lower leg: No edema.      Left lower leg: No edema.      Comments: Pedal pulses 2+ bilaterally   Lymphadenopathy:      Cervical: Cervical adenopathy present.   Skin:     General: Skin is warm and dry.      Capillary Refill: Capillary refill takes less than 2 seconds.    Neurological:      Mental Status: She is alert and oriented to person, place, and time. Mental status is at baseline.      Cranial Nerves: No cranial nerve deficit.      Comments: conversant            Labs:     CBC:  Results from last 7 days   Lab Units 10/30/24  0434 10/29/24  1954 10/29/24  1319 10/28/24  1112 10/25/24  1046 10/23/24  0849   WBC Thousand/uL 1.76*  --  1.53* 1.58* 1.68* 3.02*   HEMOGLOBIN g/dL 6.2*  --  7.6* 7.0* 7.1* 7.1*   HEMATOCRIT % 19.1*  --  23.1* 21.7* 21.9* 21.7*   PLATELETS Thousands/uL 131* 147* 133* 154 151 150   TOTAL NEUT ABS Thousands/µL 1.16*  --  1.07*  --   --  2.45       CMP:  Results from last 7 days   Lab Units 10/30/24  0434 10/29/24  1319 10/28/24  1112 10/25/24  1046 10/23/24  0849   POTASSIUM mmol/L 3.8 4.0 3.6 3.4* 3.8   CHLORIDE mmol/L 104 105 104 108 109*   CO2 mmol/L 30 31 31 27 29   BUN mg/dL 26* 32* 31* 42* 43*   CREATININE mg/dL 1.04 1.00 1.10 1.19 1.04   CALCIUM mg/dL 8.2* 8.9 8.6 8.5 8.7   AST U/L 23 21 28 19 19   ALT U/L 22 22 26 14 15   ALK PHOS U/L 103 105* 91 89 96   EGFR ml/min/1.73sq m 55 58 51 47 55   MAGNESIUM mg/dL 1.4*  --  1.3* 1.3* 1.6*   PHOSPHORUS mg/dL 3.3  --   --   --   --        Sepsis:  Results from last 7 days   Lab Units 10/30/24  0434 10/29/24  1954 10/29/24  1319   LACTIC ACID mmol/L  --  1.3  --    PROCALCITONIN ng/ml 0.17  --  0.14       Micro:  Lab Results   Component Value Date/Time    Blood Culture Received in Microbiology Lab. Culture in Progress. 10/29/2024 07:54 PM       Imaging:   CT soft tissue neck with contrast    Result Date: 10/29/2024  Impression: Posttreatment changes as described without evidence for disease progression compared to PET/CT from 8/5/2024. No acute abnormality. Workstation performed: [id number]       Medications:     Current Facility-Administered Medications:     acetaminophen (TYLENOL) oral suspension 325 mg, Q6H PRN    anastrozole (ARIMIDEX) tablet 1 mg, Daily    atorvastatin (LIPITOR) tablet 40 mg, HS     cyanocobalamin (VITAMIN B-12) tablet 1,000 mcg, Daily    diphenhydramine, lidocaine, Al/Mg hydroxide, simethicone (Magic Mouthwash) oral solution 10 mL, Q4H PRN    docusate (COLACE) oral liquid 50 mg, BID    enoxaparin (LOVENOX) subcutaneous injection 40 mg, Daily    ergocalciferol (VITAMIN D2) capsule 50,000 Units, Weekly    folic acid (FOLVITE) tablet 1 mg, Daily    gabapentin (NEURONTIN) oral solution 300 mg, Daily    gabapentin (NEURONTIN) oral solution 600 mg, HS    HYDROmorphone (DILAUDID) injection 0.5 mg, Q2H PRN    influenza vaccine, high-dose (Fluzone High-Dose) IM injection 0.5 mL, Once    losartan (COZAAR) tablet 50 mg, Daily    mirtazapine (REMERON) tablet 45 mg, HS    ondansetron (ZOFRAN) oral solution 4 mg, Q4H PRN    oxyCODONE (ROXICODONE) oral solution 5 mg, Q6H PRN    pantoprazole (PROTONIX) injection 40 mg, Q24H RAFFY    [COMPLETED] piperacillin-tazobactam (ZOSYN) 4.5 g in sodium chloride 0.9 % 100 mL IV LOADING DOSE, Once, Last Rate: Stopped (10/30/24 0209) **FOLLOWED BY** piperacillin-tazobactam (ZOSYN) 4.5 g in sodium chloride 0.9 % 100 mL IVPB (EXTENDED INFUSION), Q8H, Last Rate: 4.5 g (10/30/24 0229)    polyethylene glycol (MIRALAX) packet 17 g, Daily    potassium chloride oral solution 20 mEq, BID    pyridoxine (VITAMIN B6) tablet 50 mg, Daily    sodium chloride 0.9 % infusion, Continuous, Last Rate: 125 mL/hr (10/29/24 2328)    Patient was discussed with [facility] Attending on-call, Dr. Neena Patricia MD. See attestation above.      Porsha Figueroa DO  PGY-2 Family Medicine  10/30/24

## 2024-10-30 NOTE — QUICK NOTE
Patient evaluated at bedside during evening rounds.  Patient was not in any acute distress.  However, she did continue to complain of her sore throat.  Patient was able to speak but had considerable audible secretions.  States that majority of her pain is around her neck region.  Denies any chest pains, palpitation, SOB, headaches, dizziness, lightheadedness, N/V, diarrhea.    /65   Pulse 89   Temp 97.9 °F (36.6 °C)   Resp 18   SpO2 98%     Gen.: No acute distress  HEENT: Normocephalic, no conjunctival injection, jaundice. Mucous membranes moist, no lesions  Heart: Regular rate and rhythm, no murmurs, rubs, or gallops  Lungs: Clear to auscultation bilaterally, no wheezing, rales, or rhonchi, no accessory muscle use; lungs clear to auscultation, audible upper airway/oral transmitted noise  Abdomen: Soft, nontender, nondistended, bowel sounds positive  Extremities: Warm and well perfused; no lower extremity edema bilaterally  Skin: No rashes  Neuro: Alert, awake, oriented.    A/P: 69-year-old female who is a direct admit from her oncology office due to worsening fatigue and inability to tolerate anything orally in the setting of complex cancer history.  Patient recently got a G-tube placed earlier this month.  Consulted oncology, appreciate input.  Will do an infectious workup given her neutropenic status along with worsening fatigue in the setting of complex cancers history; there are no overt signs of infections currently, will hold off on starting any empiric antibiotics but will monitor closely.  CT soft tissue neck did not show any acute changes.  Also consulted ENT as she is s/p tonsillectomy (SCC), as well as palliative care and nutrition.  Currently, patient is stable.  Continue with current plans.  Continue to monitor overnight.    Porsha Figueroa, DO  PGY-2  [facility]

## 2024-10-30 NOTE — UTILIZATION REVIEW
Initial Clinical Review    OBS 10/29 @ 1612 UPGRADED TO INPATIENT 10/30 @ 1533 FOR CONTINUED TX 10/30 @ 1533 FOR CONTINUED TX OF UNCONTROLLED PAIN, INCREASED FATIGUE AND CHOKING SENSATION    Admission: Date/Time/Statement:   Admission Orders (From admission, onward)       Ordered        10/30/24 1533  INPATIENT ADMISSION  Once            10/29/24 1612  Place in Observation  Once                          Orders Placed This Encounter   Procedures     Answer:   Med Surg [16]    INPATIENT ADMISSION     Standing Status:   Standing     Number of Occurrences:   1     Order Specific Question:   Level of Care     Answer:   Med Surg [16]     Order Specific Question:   Estimated length of stay     Answer:   More than 2 Midnights     Order Specific Question:   Certification     Answer:   I certify that inpatient services are medically necessary for this patient for a duration of greater than two midnights. See H&P and MD Progress Notes for additional information about the patient's course of treatment.     ED Arrival Information       Expected   10/29/2024     Arrival   10/29/2024 11:46    Acuity   Urgent              Means of arrival   Wheelchair    Escorted by   Family Member    Service   Family Medicine    Admission type   Emergency              Arrival complaint   severe cancer pain             Chief Complaint   Patient presents with    Pain     Chronic pain related to cancer; pt had radiation treatment yesterday. Swelling noted to neck; pt states it is from rad treatment. Pt states pain with swallowing        Initial Presentation: 69 y.o. female to ED via wheelchair from outpatient hem-onc visit for ill appearance, uncontrolled pain, increased fatigue and choking sensations. Has not been able to take anything by mouth except small sips of water.   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  Previously, pt was on cisplatin for 6 wks with concurrent radiation however the 7th week was omitted d/t concerns for increased toxicity with increased op infusions for fluids and electrolyte replacement. PET/CT scan (8/5/24) showed concerns for suspicious progression of her malignancy; ENT held a Multidisciplinary Head and Neck Tumor Board with a consensus for re-eval by Otolaryngology for DL with biopsy of left tonsil and Radiation Oncology to consider XRT.    Due to concern for possible infection, increased toxicity or progression of her condition, pt was recommended for Direct Inpatient Admission to [facility] for further w/u  Plan: Sepsis w/u -- CXR, UA ordered. Monitor CBC, CMP, Mg, Phos, prealbumin. Consult Palliative, ENT.  Consult speech. NPO, use PEG tube for meds and food at this time. Continue Jevity 1.5 474 ml bolus TID. 60 ml free water flush pre/post bolus + 150 ml water 4x/day. Nutrition consulted. IVFs, pain control. Continue pta po meds.  Lovenox sq, SCDs.     ENT consult -- no surgical intervention from ENT at this time. Throat pain most likely from chemoRT and also to some extent with recent tonsillectomy.    Date: 10/30   Upgraded to Inpatient  Remains tachycardic. + tenderness, cervical adenopathy. Continue ceftriaxone and IVFs @ 125 ml/hr. F/u blood and urine cxs. Continue with home meds via G-tube. Tube feeding. Monitor I/Os, daily wts. Magic mouthwash, supportive care.     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 Pain control with scheduled oxy, gabapentin, tylenol. Prn dilaudid, tylenol, magic mouthwash, oxy. Continue anastrozole. Docusate and Miralax for bowel regimen.  Consulted Rad Onc for rec.       Date: 10/31  Day 3: Has surpassed a 2nd midnight with active treatments  "and services for continued tx of worsening fatigue and worsening \"choking sensation\".  Pt endorsed adequate pain-control. Pt only received Oxycodone 5 mg PO once, overnight. Pt appeared uncomfortable with speech, and requested return to sleep. Hgb of 6.2, improved to 8.7 today s/p 2 units of irradiated pRBC. Continue to monitor H&H. Continue ceftriaxone/metronidazole. F/u blood cxs, negative at 24 hrs.  Continue analgesics, antiemetics. Tube feeding via G-tube. Monitor I/Os.       ED Treatment-Medication Administration from 10/29/2024 1141 to 10/29/2024 2041         Date/Time Order Dose Route Action     10/29/2024 1345 HYDROmorphone (DILAUDID) injection 1 mg 1 mg Intravenous Given     10/29/2024 1943 oxyCODONE (ROXICODONE) oral solution 5 mg 5 mg Oral Given     10/29/2024 2031 sodium chloride 0.9 % infusion 100 mL/hr Intravenous New Bag         Scheduled Medications:  acetaminophen, 650 mg, Per G Tube, Q6H RAFFY  anastrozole, 1 mg, Per G Tube, Daily  atorvastatin, 40 mg, Per G Tube, HS  cefTRIAXone, 2,000 mg, Intravenous, Q24H  vitamin B-12, 1,000 mcg, Per G Tube, Daily  docusate, 50 mg, Oral, BID  enoxaparin, 40 mg, Subcutaneous, Daily  ergocalciferol, 50,000 Units, Oral, Weekly  folic acid, 1 mg, Per G Tube, Daily  gabapentin, 300 mg, Per G Tube, Daily  gabapentin, 600 mg, Per G Tube, HS  losartan, 50 mg, Per G Tube, Daily  metroNIDAZOLE, 500 mg, Per PEG Tube, Q12H  mirtazapine, 45 mg, Per G Tube, HS  pantoprazole, 40 mg, Intravenous, Q24H RAFFY  polyethylene glycol, 17 g, Per G Tube, Daily  potassium chloride, 20 mEq, Per G Tube, BID  pyridoxine, 50 mg, Per G Tube, Daily    Continuous IV Infusions:  sodium chloride 0.9 % infusion  Rate: 125 mL/hr Dose: 125 mL/hr  Freq: Continuous Route: IV  Last Dose: 125 mL/hr (10/29/24 5763)  Start: 10/29/24 2300 End: 10/30/24 1328      sodium chloride 0.9 % infusion  Rate: 100 mL/hr Dose: 100 mL/hr  Freq: Continuous Route: IV  Last Dose: Stopped (10/29/24 8293)  Start: 10/29/24 " 1800 End: 10/29/24 2145           PRN Meds:  diphenhydramine, lidocaine, Al/Mg hydroxide, simethicone, 10 mL, Swish & Swallow, Q4H PRN  Hydromorphone 0.5 mg IV Q2H PRN 10/29 x1  ondansetron, 4 mg, Per G Tube, Q4H PRN  Oxycodone 5 mg oral Q6H PRN 10/29 x1, 10/30 x2        ED Triage Vitals   Temperature Pulse Respirations Blood Pressure SpO2 Pain Score   10/29/24 1151 10/29/24 1151 10/29/24 1151 10/29/24 1151 10/29/24 1151 10/29/24 1345   97.9 °F (36.6 °C) 99 18 163/83 97 % 9     Weight (last 2 days)       None            Vital Signs (last 3 days)       Date/Time Temp Pulse Resp BP MAP (mmHg) SpO2 O2 Device Patient Position - Orthostatic VS Pain    10/31/24 1316 -- -- -- -- -- -- -- -- 7    10/31/24 07:29:18 98 °F (36.7 °C) 86 -- 168/88 115 97 % -- -- --    10/30/24 2210 99.1 °F (37.3 °C) -- 18 -- -- -- -- -- --    10/30/24 2153 -- -- -- -- -- -- -- -- 7    10/30/24 2145 99 °F (37.2 °C) -- 18 167/95 -- -- -- -- --    10/30/24 2018 -- -- 18 -- -- -- None (Room air) -- --    10/30/24 2003 99.3 °F (37.4 °C) 97 18 148/77 -- -- -- -- --    10/30/24 1953 99.3 °F (37.4 °C) -- 18 148/77 -- -- None (Room air) -- --    10/30/24 1948 99.4 °F (37.4 °C) -- 18 -- -- -- None (Room air) -- --    10/30/24 1931 99.4 °F (37.4 °C) 88 18 161/84 -- -- -- -- --    10/30/24 1807 -- -- -- -- -- -- -- -- 7    10/30/24 16:07:27 99.2 °F (37.3 °C) 103 18 155/88 110 98 % -- -- --    10/30/24 15:40:50 98.2 °F (36.8 °C) 91 16 150/90 110 96 % -- -- --    10/30/24 14:34:49 99.3 °F (37.4 °C) 96 18 139/67 91 93 % -- -- --    10/30/24 14:05:12 99.8 °F (37.7 °C) 108 -- 164/95 118 100 % -- -- --    10/30/24 13:39:43 99.3 °F (37.4 °C) 101 18 169/94 119 98 % -- -- --    10/30/24 1333 -- -- -- -- -- -- -- -- 7    10/30/24 13:26:06 100.3 °F (37.9 °C) 112 -- 149/77 101 100 % -- -- --    10/30/24 12:49:22 100.4 °F (38 °C) 111 16 149/73 98 98 % -- -- --    10/30/24 1008 -- -- -- -- -- -- -- -- 8    10/30/24 08:20:18 99 °F (37.2 °C) 105 18 159/94 116 99 % -- -- --     10/30/24 0800 -- -- -- -- -- -- None (Room air) -- --    10/30/24 03:39:37 98.8 °F (37.1 °C) 103 16 156/92 113 95 % -- -- --    10/29/24 23:52:33 98.3 °F (36.8 °C) 103 16 116/68 84 97 % -- Lying --    10/29/24 23:52:09 98.3 °F (36.8 °C) 102 -- 116/68 84 96 % -- -- --    10/29/24 23:01:18 98.3 °F (36.8 °C) 105 -- -- -- 96 % -- -- --    10/29/24 2212 -- -- -- -- -- -- -- -- 8    10/29/24 2138 -- 111 -- -- -- 98 % -- -- --    10/29/24 2100 98.3 °F (36.8 °C) -- -- 135/75 95 -- -- -- --    10/29/24 20:59:14 98.3 °F (36.8 °C) -- 22 135/75 95 -- -- -- --    10/29/24 2045 -- -- -- -- -- -- -- -- 8    10/29/24 1943 -- -- -- -- -- -- -- -- 8    10/29/24 1800 -- 89 18 141/65 94 98 % None (Room air) -- --    10/29/24 1345 -- 98 17 171/86 121 98 % None (Room air) -- 9    10/29/24 1151 97.9 °F (36.6 °C) 99 18 163/83 -- 97 % None (Room air) -- --              Pertinent Labs/Diagnostic Test Results:   Radiology:  XR chest pa and lateral   Final Interpretation by Estefani Pacheco MD (10/30 0716)      No acute cardiopulmonary disease.            Workstation performed: [id number]         CT soft tissue neck with contrast   Final Interpretation by Rad Howard MD (10/29 6541)      Posttreatment changes as described without evidence for disease progression compared to PET/CT from 8/5/2024.      No acute abnormality.      Workstation performed: [id number]           Cardiology:  ECG 12 lead   Final Result by Corky Hodges MD (10/29 2122)   Sinus tachycardia   Otherwise normal ECG   When compared with ECG of 06-JUN-2024 13:44,   No significant change was found   Confirmed by Corky Hodges ([id number]) on 10/29/2024 9:22:31 PM          Results from last 7 days   Lab Units 10/29/24  1948   SARS-COV-2  Negative     Results from last 7 days   Lab Units 10/31/24  0638 10/31/24  0446 10/30/24  0749 10/30/24  0434 10/29/24  1954 10/29/24  1319 10/28/24  1112 10/25/24  1046   WBC Thousand/uL 1.65*  --   --  1.76*  --  1.53* 1.58*  1.68*   HEMOGLOBIN g/dL 8.7* 12.8 6.2* 6.2*  --  7.6* 7.0* 7.1*   HEMATOCRIT % 25.9* 38.2 19.2* 19.1*  --  23.1* 21.7* 21.9*   PLATELETS Thousands/uL 139*  --   --  131* 147* 133* 154 151   TOTAL NEUT ABS Thousands/µL  --   --   --  1.16*  --  1.07*  --   --    BANDS PCT %  --   --   --   --   --   --  5 3     Results from last 7 days   Lab Units 10/31/24  0638 10/30/24  0434 10/29/24  1319 10/28/24  1112 10/25/24  1046   SODIUM mmol/L 141 142 143 142 144   POTASSIUM mmol/L 4.0 3.8 4.0 3.6 3.4*   CHLORIDE mmol/L 104 104 105 104 108   CO2 mmol/L 30 30 31 31 27   ANION GAP mmol/L 7 8 7 7 9   BUN mg/dL 25 26* 32* 31* 42*   CREATININE mg/dL 0.96 1.04 1.00 1.10 1.19   EGFR ml/min/1.73sq m 60 55 58 51 47   CALCIUM mg/dL 8.5 8.2* 8.9 8.6 8.5   MAGNESIUM mg/dL 1.7* 1.4*  --  1.3* 1.3*   PHOSPHORUS mg/dL  --  3.3  --   --   --      Results from last 7 days   Lab Units 10/31/24  0638 10/30/24  0434 10/29/24  1319 10/28/24  1112 10/25/24  1046   AST U/L 32 23 21 28 19   ALT U/L 28 22 22 26 14   ALK PHOS U/L 104 103 105* 91 89   TOTAL PROTEIN g/dL 6.8 6.3* 6.9 7.3 7.3   ALBUMIN g/dL 2.9* 2.8* 3.2* 3.3* 3.4*   TOTAL BILIRUBIN mg/dL 0.57 0.34 0.33 0.23 0.31       Results from last 7 days   Lab Units 10/31/24  0638 10/30/24  0434 10/29/24  1319 10/28/24  1112 10/25/24  1046   GLUCOSE RANDOM mg/dL 114 102 102 119 148*       Results from last 7 days   Lab Units 10/30/24  0434 10/29/24  1319   PROCALCITONIN ng/ml 0.17 0.14     Results from last 7 days   Lab Units 10/29/24  1954   LACTIC ACID mmol/L 1.3     Results from last 7 days   Lab Units 10/31/24  0555   UNIT PRODUCT CODE  [id number]  [id number]   UNIT NUMBER  [id number]  [id number]   UNITABO  O  O   UNITRH  POS  POS   CROSSMATCH  Compatible  Compatible   UNIT DISPENSE STATUS  Presumed Trans  Presumed Trans   UNIT PRODUCT VOL mL 350  350     Results from last 7 days   Lab Units 10/29/24  1948   INFLUENZA A PCR  Negative   INFLUENZA B PCR  Negative   RSV PCR   Negative         Results from last 7 days   Lab Units 10/29/24  2328 10/29/24  1954   BLOOD CULTURE  No Growth at 24 hrs. No Growth at 24 hrs.         Past Medical History:   Diagnosis Date    Anemia     Arthritis     Body mass index (BMI) 40.0-44.9, adult (HCC)     Breast cancer (HCC) 12/17/2018    Cancer (HCC)     right breast, colon, liver, right tonsil    Cervical lymphadenopathy     CT neck on 3/30/2023- Large right level 2A lymphadenopathy as described above and suspicious for neoplasm.  Correlation with histopathology is recommended.  Mild asymmetric prominence of the right palatine tonsil with otherwise no definitive nodular enhancing lesions identified along the course of the aerodigestive tract.     5/26/23- FNA of this node was nonrevealing for tissue etiology, but it d    Encounter for screening for HIV 07/07/2022    Follow-up examination 04/04/2023    GERD (gastroesophageal reflux disease)     Hyperlipidemia     Hypertension     Obesity     Stroke (HCC)     TIA     Stroke (HCC)     TIA     Transaminitis 09/22/2021    Vasomotor rhinitis     Refilled flonase today. Stopped taking since January 2022     Present on Admission:   Primary hypertension   Mixed hyperlipidemia   Malignant neoplasm of right female breast (HCC)   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HCC)   GERD (gastroesophageal reflux disease)   Metastatic colon cancer to liver (HCC)   Chemotherapy induced neutropenia (HCC)   Stage 3a chronic kidney disease (HCC)      Admitting Diagnosis: Cancer associated pain [G89.3]  Acute pain [R52]  Sensation, choking [R09.89]  On tube feeding diet [Z78.9]  S/P percutaneous endoscopic gastrostomy (PEG) tube placement (HCC) [Z93.1]  Fatigue, unspecified type [R53.83]  Age/Sex: 69 y.o. female    Network Utilization Review Department  ATTENTION: Please call with any questions or concerns to [phone] and carefully listen to the prompts so that you are directed to the right person. All voicemails are  confidential.   For Discharge needs, contact Care Management DC Support Team at [phone] opt. 2  Send all requests for admission clinical reviews, approved or denied determinations and any other requests to dedicated fax number below belonging to the campus where the patient is receiving treatment. List of dedicated fax numbers for the Facilities:  FACILITY NAME UR FAX NUMBER   ADMISSION DENIALS (Administrative/Medical Necessity) [phone]   DISCHARGE SUPPORT TEAM (NETWORK) [phone]   PARENT CHILD HEALTH (Maternity/NICU/Pediatrics) [phone]   [facility] [phone]   [facility] [phone]   [facility] [phone]   [facility] [phone]   [facility] [phone]   [facility] [phone]   [facility] [phone]   [facility] [phone]   [facility] [phone]   Critical access hospital [phone]   [facility] [phone]   [facility] [phone]

## 2024-10-30 NOTE — PLAN OF CARE
Problem: PAIN - ADULT  Goal: Verbalizes/displays adequate comfort level or baseline comfort level  Description: Interventions:  - Encourage patient to monitor pain and request assistance  - Assess pain using appropriate pain scale  - Administer analgesics based on type and severity of pain and evaluate response  - Implement non-pharmacological measures as appropriate and evaluate response  - Consider cultural and social influences on pain and pain management  - Notify physician/advanced practitioner if interventions unsuccessful or patient reports new pain  Outcome: Progressing     Problem: INFECTION - ADULT  Goal: Absence or prevention of progression during hospitalization  Description: INTERVENTIONS:  - Assess and monitor for signs and symptoms of infection  - Monitor lab/diagnostic results  - Monitor all insertion sites, i.e. indwelling lines, tubes, and drains  - Monitor endotracheal if appropriate and nasal secretions for changes in amount and color  - [place] appropriate cooling/warming therapies per order  - Administer medications as ordered  - Instruct and encourage patient and family to use good hand hygiene technique  - Identify and instruct in appropriate isolation precautions for identified infection/condition  Outcome: Progressing  Goal: Absence of fever/infection during neutropenic period  Description: INTERVENTIONS:  - Monitor WBC    Outcome: Progressing     Problem: SAFETY ADULT  Goal: Patient will remain free of falls  Description: INTERVENTIONS:  - Educate patient/family on patient safety including physical limitations  - Instruct patient to call for assistance with activity   - Consult OT/PT to assist with strengthening/mobility   - Keep Call bell within reach  - Keep bed low and locked with side rails adjusted as appropriate  - Keep care items and personal belongings within reach  - Initiate and maintain comfort rounds  - Make Fall Risk Sign visible to staff  - Offer Toileting every 2 Hours,  in advance of need  - Initiate/Maintain bed alarm  - Apply yellow socks and bracelet for high fall risk patients  - Consider moving patient to room near nurses station  Outcome: Progressing  Goal: Maintain or return to baseline ADL function  Description: INTERVENTIONS:  -  Assess patient's ability to carry out ADLs; assess patient's baseline for ADL function and identify physical deficits which impact ability to perform ADLs (bathing, care of mouth/teeth, toileting, grooming, dressing, etc.)  - Assess/evaluate cause of self-care deficits   - Assess range of motion  - Assess patient's mobility; develop plan if impaired  - Assess patient's need for assistive devices and provide as appropriate  - Encourage maximum independence but intervene and supervise when necessary  - Involve family in performance of ADLs  - Assess for home care needs following discharge   - Consider OT consult to assist with ADL evaluation and planning for discharge  - Provide patient education as appropriate  Outcome: Progressing  Goal: Maintains/Returns to pre admission functional level  Description: INTERVENTIONS:  - Perform AM-PAC 6 Click Basic Mobility/ Daily Activity assessment daily.  - Set and communicate daily mobility goal to care team and patient/family/caregiver.   - Collaborate with rehabilitation services on mobility goals if consulted  - Perform Range of Motion 3 times a day.  - Reposition patient every 2 hours.  - Dangle patient 3 times a day  - Stand patient 3 times a day  - Ambulate patient 3 times a day  - Out of bed to chair 3 times a day   - Out of bed for meals 3 times a day  - Out of bed for toileting  - Record patient progress and toleration of activity level   Outcome: Progressing     Problem: DISCHARGE PLANNING  Goal: Discharge to home or other facility with appropriate resources  Description: INTERVENTIONS:  - Identify barriers to discharge w/patient and caregiver  - Arrange for needed discharge resources and  transportation as appropriate  - Identify discharge learning needs (meds, wound care, etc.)  - Arrange for interpretive services to assist at discharge as needed  - Refer to Case Management Department for coordinating discharge planning if the patient needs post-hospital services based on physician/advanced practitioner order or complex needs related to functional status, cognitive ability, or social support system  Outcome: Progressing     Problem: Knowledge Deficit  Goal: Patient/family/caregiver demonstrates understanding of disease process, treatment plan, medications, and discharge instructions  Description: Complete learning assessment and assess knowledge base.  Interventions:  - Provide teaching at level of understanding  - Provide teaching via preferred learning methods  Outcome: Progressing     Problem: Nutrition/Hydration-ADULT  Goal: Nutrient/Hydration intake appropriate for improving, restoring or maintaining nutritional needs  Description: Monitor and assess patient's nutrition/hydration status for malnutrition. Collaborate with interdisciplinary team and initiate plan and interventions as ordered.  Monitor patient's weight and dietary intake as ordered or per policy. Utilize nutrition screening tool and intervene as necessary. Determine patient's food preferences and provide high-protein, high-caloric foods as appropriate.     INTERVENTIONS:  - Monitor oral intake, urinary output, labs, and treatment plans  - Assess nutrition and hydration status and recommend course of action  - Evaluate amount of meals eaten  - Assist patient with eating if necessary   - Allow adequate time for meals  - Recommend/ encourage appropriate diets, oral nutritional supplements, and vitamin/mineral supplements  - Order, calculate, and assess calorie counts as needed  - Recommend, monitor, and adjust tube feedings and TPN/PPN based on assessed needs  - Assess need for intravenous fluids  - Provide specific  nutrition/hydration education as appropriate  - Include patient/family/caregiver in decisions related to nutrition  Outcome: Progressing

## 2024-10-30 NOTE — ED NOTES
Secured message sent to floor charge.  BE fam intern made aware only one set of blood culture was able to be obtained.     Oksana Hackett RN  10/29/24 2032       Oksana Hackett RN  10/29/24 2033

## 2024-10-30 NOTE — CONSULTS
Consultation Note: Medical Oncology:   Maira Moura 69 y.o. 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:  Oligometastatic Disease to the Liver:  History of Early-Stage Hormone-Positive Right Breast Cancer:  Patient is a 69-year-old female, with a complex Oncologic history, including synchronous de radha metastatic adenocarcinoma of the mid-ascending colon (Complicated by partial-obstruction requiring right hemicolectomy on March 15th, 2024) with biopsy-proven oligometastatic disease to the liver (Status-post cryoablation on June 3rd, 2024), and unresectable, 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 Palliative Care was consulted for assistance with symptom-management. Modified pain-regimen, as outlined below. Would recommend repeat CT Chest, Abdomen, and Pelvis with contrast to complete re-staging (CT Neck showed post-treatment changes without evidence of interval disease progression). Additionally, placed Case Management consultation for assistance with coordination of Home Health Services. Lastly, would recommend consultations of Physical and Occupational Therapy, for functional-assessment pending improvement in pain-control. Discussed the above-mentioned with patient's daughter, Eliane Moura (At bedside); who expressed understanding and agreement. Note: Patient was somnolent on multiple reassessments throughout the day, prompting de-escalation of pain-regimen from Oxycodone Scheduled to PRN, as outlined below.    Summary of Recommendations:  Obtain repeat CT Chest, Abdomen, and Pelvis with contrast to  complete re-staging.  Primary Medical Oncologist discussed case with Radiation Oncology - Hold further radiation at this time.  Resumption of outpatient radiation next week, is dependent on clinical course.  Modified pain-regimen, as follows:  Started Acetaminophen 650 mg Q6HR Scheduled.  Started Oxycodone 5 mg Per-Tube Q4HR Scheduled.   Changed to Oxycodone 5 mg Per-Tube Q4HR PRN due to profound lethargy.  Continue Gabapentin 300 mg Per-Tube Daily, and 600 mg Per-Tube Bedtime (Scheduled).  Continue anti-emetic regimen, as follows: Ondansetron 4 mg Per-Tube Q4HR PRN (Nausea).  Continue bowel-regimen to prevent opiate-induced constipation: Miralax 17 Grams Per Tube Daily.  Recommend Physical and Occupational Therapy consultations, for further evaluation.  Placed consultation for Case Management, to assist with coordination of Home Health Services.    Subjective:  History of Present Illness: Maira Moura is a 69-year-old female, with an established history of hypertension, hyperlipidemia, cerebrovascular accident, stage 3A chronic kidney disease, early-stage hormone-positive right breast cancer (Status-post right lumpectomy on December 20th, 2018; Completed adjuvant radiation in April 2019; Currently on Anastrozole - Since February 2019), de naveed metastatic adenocarcinoma of the mid-ascending colon (Complicated by partial-obstruction requiring right hemicolectomy on March 15th, 2024) with biopsy-proven oligometastatic disease to the liver (Status-post cryoablation on June 3rd, 2024), and unresectable, 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 on October 29th, 2024, with intractable cancer-related pain. Historical information was obtained predominately from prior documentation, given patient somnolence.     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 Her case was presented at the Multidisciplinary Head and Neck Tumor Board on August 12th, 2024, which culminated in consensus for re-evaluation by Otolaryngology and Radiation Oncology. Patient was recommended definitive chemoradiation. She has was initiated on concurrently chemoradiation with weekly Cisplatin (Dose-reduced from Cisplatin 40 mg/m2 to 30 mg/m2 beginning with Cycle 4, due to poor-tolerance), beginning on September 17th, 2024 (Cycle 1). Patient has since completed six-cycles, thus far (Cycle 6 Day 1 was administered on October 21st, 2024). Cycle 7 of Cisplatin was cancelled, given poor-tolerance of the above-mentioned. Patient was tentatively anticipating completion of radiation next Wednesday, November 6th, 2024. Of note, patient's Oncologic course has been complicated by interval tonsillectomy on October 9th, 2024, resulting in intractable pharyngeal pain in the setting of ongoing chemoradiation. She was prescribed Oxycodone 5 mg Per Tube Q6HR PRN; of which has not appropriately managed the above-mentioned. Lastly, regarding early-stage hormone-positive right breast cancer, patient is status-post right lumpectomy with sentinel lymph node biopsy on December 20th, 2018, followed by adjuvant radiation (Completed in April 2019). She remains on adjuvant endocrine-therapy with Anastrozole, since February 2019. Additionally, patient was diagnosed with de naveed metastatic adenocarcinoma of the mid-ascending colon with biopsy-proven oligometastatic disease to the liver in July 2023. Patient is status-post systemic-chemoimmunotherapy, followed by right hemicolectomy due to partial-bowel obstruction in March 2024, followed by resumption of systemic-therapy as well as cryoablation of hepatic metastasis in June 2024. Additional systemic-therapy for colon cancer was held in the absence of residual disease; and in favoring  definitive treatment of right tonsillar cancer.    Patient presented to outpatient Medical Oncology on October 29th, 2024, for routine follow-up. On evaluation, patient was ill-appearing, and uncomfortable due to intractable pain; prompting referral to [facility] Emergency Department. On arrival, patient was noted to have leukopenia with mild neutropenia (WBC: 1.53 ANC: 1,070), normocytic anemia (Hemoglobin: 7.6 MCV: 88), thrombocytopenia (Platelet Count: 133), and prerenal azotemia (BUN: 32 Creatinine: 1.00 eGFR: 58. Lactic Acid: 1.3. Procalcitonin: 0.14. Respiratory Panel: RSV, COVID-19, and Influenza: Not detected. CT Soft Tissue Neck was obtained, and demonstrated post-treatment changes without evidence of interval disease progression. Chest X-Ray was negative. Patient was admitted to [facility] for intravenous-fluid resuscitation and pain-control; as well as resumption of tube-feeds. Additionally, patient was evaluated by Otolaryngology. Recommended supportive-cares, as indicated above. Palliative Care was consulted for assistance with symptom-management.    Review of Systems:  All systems reviewed and negative except otherwise listed in the History of Present Illness.    Oncologic History:  Oncology History   Malignant neoplasm of right female breast (HCC)   11/23/2018 Initial Diagnosis    Malignant neoplasm of right female breast (HCC)     11/23/2018 Biopsy    Rt Breast US BX 1100 8cmfn, 4 passes 12g Marquee:  - Invasive breast carcinoma of no special type (ductal NST/invasive ductal carcinoma).  - grade 2  - %  - NH 95%  - HER2 negative     12/20/2018 Surgery    Right partial mastectomy and SLN biopsy:  Infiltrating ductal carcinoma, Grade 2/3, felt to be between 2.0 cm and 2.5 cm in greatest dimension  - No invasive tumor is seen at inked margins, but there is a focus of DCIS seen within approximately 1mm of the inked medial margin  - no LVI  - no LCIS  - 0/2  "LN's  at least Stage IIA - pT2, pN0, cM0, G2.    - Dr Coronado     12/20/2018 -  Cancer Staged    Stage IIA - pT2, pN0, cM0, G2.       1/4/2019 Genomic Testing    Oncotype DX score: 13     2/1/2019 -  Hormone Therapy    anastrozole 1 mg daily as adjuvant endocrine therapy    - Dr Daley       2/14/2019 - 4/3/2019 Radiation    Course: C1    Plan ID Energy Fractions Dose per Fraction (cGy) Dose Correction (cGy) Total Dose Delivered (cGy) Elapsed Days   R Breast 10X/6X 25 / 25 200 0 5,000 40   R BRST BOOST 16E 5 / 5 200 0 1,000 7      Treatment dates:  C1: 2/14/2019 - 4/3/2019       Metastatic colon cancer to liver (HCC)   7/6/2023 Genetic Testing    CARIS Results:   KRAS Pathogenic Variant Exon 2  p.G12D : lack of benefit of cetuximab, panitumumab Level 2   No other actionable mutation; MSI stable; TMB low   MiFOLOXAi Results: \"Decreased Benefit of FOLFOX + Bevacizumab in first line metastatic CRC.  This patient may achieve improved results by receiving an alternative to FOLFOX, such as FOLFIRI, as their initial regimen. As an adjustment to frontline FOLFOXIRI following toxicity: This patient may achieve improved results by removing the oxaliplatin portion of their regimen\".         7/11/2023 Initial Diagnosis    Metastatic colon cancer to liver (HCC)     7/13/2023 -  Cancer Staged    Staging form: Colon and Rectum, AJCC 8th Edition  - Clinical stage from 7/13/2023: cT3, cN0, pM1 - Signed by Harika Medina DO on 9/28/2023  Total positive nodes: 0       7/31/2023 - 8/1/2024 Chemotherapy    cyanocobalamin, 1,000 mcg, Intramuscular, Every 30 days, 7 of 9 cycles  Administration: 1,000 mcg (5/9/2024), 1,000 mcg (5/24/2024), 1,000 mcg (6/20/2024), 1,000 mcg (7/3/2024), 1,000 mcg (7/18/2024), 1,000 mcg (8/1/2024)  potassium chloride, 20 mEq, Intravenous, Once, 2 of 2 cycles  Administration: 20 mEq (11/6/2023), 20 mEq (11/20/2023)  alteplase (CATHFLO), 2 mg, Intracatheter, Every 1 Minute as needed, 21 of 23 " cycles  Administration: 2 mg (1/3/2024)  pegfilgrastim (NEULASTA), 6 mg, Subcutaneous, Once, 12 of 12 cycles  Administration: 6 mg (10/25/2023), 6 mg (11/8/2023), 6 mg (11/22/2023), 6 mg (12/6/2023), 6 mg (12/20/2023), 6 mg (1/12/2024), 6 mg (1/25/2024), 6 mg (4/25/2024), 6 mg (5/9/2024), 6 mg (5/24/2024), 6 mg (6/20/2024)  fluorouracil (ADRUCIL), 895 mg, Intravenous, Once, 21 of 23 cycles  Dose modification: 320 mg/m2 (original dose 400 mg/m2, Cycle 11)  Administration: 900 mg (7/31/2023), 900 mg (8/14/2023), 900 mg (8/28/2023), 900 mg (9/11/2023), 900 mg (9/25/2023), 900 mg (10/9/2023), 900 mg (10/23/2023), 895 mg (11/6/2023), 895 mg (11/20/2023), 895 mg (12/4/2023), 700 mg (12/18/2023), 680 mg (1/10/2024), 680 mg (1/23/2024), 625 mg (4/23/2024), 625 mg (5/7/2024), 625 mg (5/22/2024), 625 mg (6/4/2024), 625 mg (6/18/2024), 625 mg (7/1/2024), 625 mg (7/16/2024), 625 mg (7/30/2024)  nivolumab (OPDIVO) IVPB, 240 mg (100 % of original dose 240 mg), Intravenous, Once, 13 of 15 cycles  Dose modification: 240 mg (original dose 240 mg, Cycle 9)  Administration: 240 mg (11/20/2023), 240 mg (12/4/2023), 240 mg (12/18/2023), 240 mg (1/10/2024), 240 mg (1/23/2024), 240 mg (4/23/2024), 240 mg (5/7/2024), 240 mg (5/22/2024), 240 mg (6/4/2024), 240 mg (6/18/2024), 240 mg (7/1/2024), 240 mg (7/16/2024), 240 mg (7/30/2024)  leucovorin calcium IVPB, 896 mg, Intravenous, Once, 21 of 23 cycles  Administration: 900 mg (7/31/2023), 900 mg (8/14/2023), 900 mg (8/28/2023), 900 mg (9/11/2023), 900 mg (9/25/2023), 900 mg (10/9/2023), 900 mg (10/23/2023), 900 mg (11/6/2023), 900 mg (11/20/2023), 900 mg (12/4/2023), 900 mg (12/18/2023), 850 mg (1/10/2024), 850 mg (1/23/2024), 800 mg (4/23/2024), 800 mg (5/7/2024), 800 mg (5/22/2024), 800 mg (6/4/2024), 800 mg (6/18/2024), 800 mg (7/1/2024), 800 mg (7/16/2024), 800 mg (7/30/2024)  oxaliplatin (ELOXATIN) chemo infusion, 85 mg/m2 = 190.4 mg, Intravenous, Once, 12 of 12 cycles  Dose  modification: 68 mg/m2 (original dose 85 mg/m2, Cycle 11, Reason: Other (Must fill in a comment), Comment: increased fatigue)  Administration: 190.4 mg (7/31/2023), 190.4 mg (8/14/2023), 200 mg (8/28/2023), 200 mg (9/11/2023), 200 mg (9/25/2023), 200 mg (10/9/2023), 200 mg (10/23/2023), 200 mg (11/6/2023), 200 mg (11/20/2023), 190.4 mg (12/4/2023), 150 mg (12/18/2023), 150 mg (1/10/2024)  fluorouracil (ADRUCIL) ambulatory infusion Soln, 1,200 mg/m2/day = 5,375 mg, Intravenous, Over 46 hours, 21 of 23 cycles     9/26/2023 -  Cancer Staged    Staging form: Colon and Rectum, AJCC 8th Edition  - Pathologic: pT3, pN0, cM1 - Signed by Vickie Israel MD on 4/3/2024  Total positive nodes: 0       3/12/2024 Surgery    A. Terminal ileum, appendix, right colon (right hemicolectomy):  - Adenocarcinoma (5.2cm)  - Forty-nine (49) lymph nodes negative for carcinoma (0/49)    - Acellular mucin present in one (1) lymph node   - See staging synoptic (ypT3N0)     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HCC)   7/27/2023 Initial Diagnosis    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HCC)     7/27/2023 -  Cancer Staged    Staging form: Pharynx - Oropharynx, AJCC 8th Edition  - Clinical stage from 7/27/2023: Stage III (cT1, cN3, cM0, p16+) - Signed by Evan Plummer MD on 7/27/2023  Stage prefix: Initial diagnosis       9/17/2024 -  Chemotherapy    alteplase (CATHFLO), 2 mg, Intracatheter, Every 1 Minute as needed, 6 of 6 cycles  Administration: 2 mg (10/21/2024)  CISplatin (PLATINOL) infusion, 70 mg (original dose 40 mg/m2), Intravenous, Once, 6 of 6 cycles  Dose modification: 70 mg (original dose 40 mg/m2, Cycle 1, Reason: Anticipated Tolerance), 30 mg/m2 (original dose 40 mg/m2, Cycle 4, Reason: Neuropathy, Comment: dose reduction to 30 mg/m2 due to neuropathy)  Administration: 70 mg (9/17/2024), 70 mg (9/23/2024), 70 mg (9/30/2024), 59.1 mg (10/7/2024), 59.1 mg (10/14/2024), 59.1 mg (10/21/2024)  sodium chloride, 500 mL,  Intravenous, Once, 6 of 6 cycles  Administration: 500 mL (9/17/2024), 500 mL (9/17/2024), 500 mL (9/23/2024), 500 mL (9/23/2024), 500 mL (9/30/2024), 500 mL (9/30/2024), 500 mL (10/7/2024), 500 mL (10/7/2024), 500 mL (10/14/2024), 500 mL (10/14/2024), 500 mL (10/21/2024)  fosaprepitant (EMEND) IVPB, 150 mg, Intravenous, Once, 6 of 6 cycles  Administration: 150 mg (9/17/2024), 150 mg (9/23/2024), 150 mg (9/30/2024), 150 mg (10/7/2024), 150 mg (10/14/2024), 150 mg (10/21/2024)  IVPB builder, , Intravenous, Once, 1 of 1 cycle  Administration: Unknown dose (10/21/2024)       Historical Information:  Past Medical History:   Diagnosis Date    Anemia     Arthritis     Body mass index (BMI) 40.0-44.9, adult (HCC)     Breast cancer (HCC) 12/17/2018    Cancer (HCC)     right breast, colon, liver, right tonsil    Cervical lymphadenopathy     CT neck on 3/30/2023- Large right level 2A lymphadenopathy as described above and suspicious for neoplasm.  Correlation with histopathology is recommended.  Mild asymmetric prominence of the right palatine tonsil with otherwise no definitive nodular enhancing lesions identified along the course of the aerodigestive tract.     5/26/23- FNA of this node was nonrevealing for tissue etiology, but it d    Encounter for screening for HIV 07/07/2022    Follow-up examination 04/04/2023    GERD (gastroesophageal reflux disease)     Hyperlipidemia     Hypertension     Obesity     Stroke (HCC)     TIA     Stroke (HCC)     TIA     Transaminitis 09/22/2021    Vasomotor rhinitis     Refilled flonase today. Stopped taking since January 2022     Past Surgical History:   Procedure Laterality Date    BREAST BIOPSY Right 11/23/2018    us guided bx cancer    BREAST SURGERY      COLONOSCOPY      ESOPHAGOSCOPY N/A 07/20/2023    Procedure: ESOPHAGOSCOPY;  Surgeon: David Chapa MD;  [facility];  Service: ENT    HEMICOLOECTOMY W/ ANASTOMOSIS Right 3/12/2024    Procedure: RIGHT COLECTOMY WITH  ROBOTICS;  Surgeon: Vickie Israel MD;  [facility];  Service: Surgical Oncology    IR BIOPSY LIVER MASS  2023    IR CRYOABLATION  6/3/2024    IR GASTROSTOMY TUBE PLACEMENT  10/2/2024    IR PORT CHECK  2024    IR PORT PLACEMENT  2023    IR PORT STRIPPING  2024    LAPAROTOMY N/A 3/12/2024    Procedure: LAPAROTOMY EXPLORATORY W/ ROBOTICS;  Surgeon: Vickie Israel MD;  [facility];  Service: Surgical Oncology    OR BIOPSY NASOPHARYNX VISIBLE LESION SIMPLE N/A 10/9/2024    Procedure: NASOPHARYNGOSCOPY with biospy;  Surgeon: Juanito Matthew MD;  [facility];  Service: ENT    [facility] W/CELL WASHG SPX N/A 2023    Procedure: BRONCHOSCOPY;  Surgeon: David Chapa MD;  [facility];  Service: ENT    OR LARYNGOSCOPY W/BIOPSY MICROSCOPE/TELESCOPE N/A 2023    Procedure: MICRODIRECT LARYNGOSCOPY WITH BIOPSY;  Surgeon: David Chapa MD;  [facility];  Service: ENT    OR LARYNGOSCOPY W/WO TRACHEOSCOPY DX EXCEPT  N/A 10/9/2024    Procedure: DIRECT LARYNGOSCOPY;  Surgeon: Juanito Matthew MD;  [facility];  Service: ENT    OR MASTECTOMY PARTIAL W/AXILLARY LYMPHADENECTOMY Right 2018    Procedure: ULTRASOUND LOCALIZED PARTIAL MASTECTOMY W/SENTINEL NODE BIOPSY POSS AXILLARY DISSECTION;  Surgeon: Zahida Coronado MD;  Location: [facility];  Service: General    OR TONSILLECTOMY PRIMARY/SECONDARY AGE 12/> N/A 10/9/2024    Procedure: TONSILLECTOMY;  Surgeon: Juanito Matthew MD;  [facility];  Service: ENT    TUBAL LIGATION      US GUIDED BREAST BIOPSY RIGHT COMPLETE Right 2018    US GUIDED INJECTION FOR RESEARCH STUDY  2018    US GUIDED INJECTION FOR RESEARCH STUDY  2018    US GUIDED INJECTION FOR RESEARCH STUDY  2019    US GUIDED LYMPH NODE BIOPSY RIGHT  2023     Social History:  Social History     Substance and Sexual Activity   Alcohol Use Never      Social History     Substance and Sexual Activity   Drug Use Never     Social History     Tobacco Use   Smoking Status Never    Passive exposure: Never   Smokeless Tobacco Never   Tobacco Comments    NO TOBACCO USE     E-Cigarette/Vaping    E-Cigarette Use Never User      E-Cigarette/Vaping Substances    Nicotine No     THC No     CBD No     Flavoring No     Other No     Unknown No      Family History: Non-Contributory.    Medications and Allergies: All Medications Reviewed.    Objective:  Vitals:    10/30/24 0820   BP: 159/94   Pulse: 105   Resp: 18   Temp: 99 °F (37.2 °C)   SpO2: 99%     Physical Exam:  General: Somnolent on Initial Exam and Upon Multiple Reassessments  Respiratory: Normal work of breathing; No supplemental oxygen requirement    WBC   Date Value Ref Range Status   10/30/2024 1.76 (L) 4.31 - 10.16 Thousand/uL Final   10/29/2024 1.53 (L) 4.31 - 10.16 Thousand/uL Final   10/28/2024 1.58 (L) 4.31 - 10.16 Thousand/uL Final     Hemoglobin   Date Value Ref Range Status   10/30/2024 6.2 (L) 11.5 - 15.4 g/dL Final   10/30/2024 6.2 (L) 11.5 - 15.4 g/dL Final   10/29/2024 7.6 (L) 11.5 - 15.4 g/dL Final     Platelets   Date Value Ref Range Status   10/30/2024 131 (L) 149 - 390 Thousands/uL Final   10/29/2024 147 (L) 149 - 390 Thousands/uL Final   10/29/2024 133 (L) 149 - 390 Thousands/uL Final     MCV   Date Value Ref Range Status   10/30/2024 88 82 - 98 fL Final   10/29/2024 88 82 - 98 fL Final   10/28/2024 86 82 - 98 fL Final      Potassium   Date Value Ref Range Status   10/30/2024 3.8 3.5 - 5.3 mmol/L Final   10/29/2024 4.0 3.5 - 5.3 mmol/L Final   10/28/2024 3.6 3.5 - 5.3 mmol/L Final   02/20/2024 4.0 3.5 - 5.2 mmol/L Final   01/04/2022 3.7 3.5 - 5.2 mmol/L Final   01/03/2022 3.4 (L) 3.5 - 5.2 mmol/L Final     Chloride   Date Value Ref Range Status   10/30/2024 104 96 - 108 mmol/L Final   10/29/2024 105 96 - 108 mmol/L Final   10/28/2024 104 96 - 108 mmol/L Final   02/20/2024 104 100 - 109 mmol/L  Final   01/04/2022 110 (H) 100 - 109 mmol/L Final   01/03/2022 107 100 - 109 mmol/L Final     Carbon Dioxide   Date Value Ref Range Status   02/20/2024 23 21 - 31 mmol/L Final   01/04/2022 25 23 - 31 mmol/L Final   01/03/2022 27 23 - 31 mmol/L Final     CO2   Date Value Ref Range Status   10/30/2024 30 21 - 32 mmol/L Final   10/29/2024 31 21 - 32 mmol/L Final   10/28/2024 31 21 - 32 mmol/L Final     CO2, i-STAT   Date Value Ref Range Status   03/12/2024 26 21 - 32 mmol/L Final   03/12/2024 24 21 - 32 mmol/L Final     BUN   Date Value Ref Range Status   10/30/2024 26 (H) 5 - 25 mg/dL Final   10/29/2024 32 (H) 5 - 25 mg/dL Final   10/28/2024 31 (H) 5 - 25 mg/dL Final   02/20/2024 12 7 - 25 mg/dL Final   01/04/2022 12 7 - 25 mg/dL Final   01/03/2022 18 7 - 25 mg/dL Final     Creatinine   Date Value Ref Range Status   10/30/2024 1.04 0.60 - 1.30 mg/dL Final     Comment:     Standardized to IDMS reference method   10/29/2024 1.00 0.60 - 1.30 mg/dL Final     Comment:     Standardized to IDMS reference method   10/28/2024 1.10 0.60 - 1.30 mg/dL Final     Comment:     Standardized to IDMS reference method   02/20/2024 0.97 0.40 - 1.10 mg/dL Final   01/04/2022 0.68 0.40 - 1.10 mg/dL Final   01/03/2022 1.01 0.40 - 1.10 mg/dL Final     Glucose   Date Value Ref Range Status   10/30/2024 102 65 - 140 mg/dL Final     Comment:     If the patient is fasting, the ADA then defines impaired fasting glucose as > 100 mg/dL and diabetes as > or equal to 123 mg/dL.   10/29/2024 102 65 - 140 mg/dL Final     Comment:     If the patient is fasting, the ADA then defines impaired fasting glucose as > 100 mg/dL and diabetes as > or equal to 123 mg/dL.   10/28/2024 119 65 - 140 mg/dL Final     Comment:     If the patient is fasting, the ADA then defines impaired fasting glucose as > 100 mg/dL and diabetes as > or equal to 123 mg/dL.   02/20/2024 114 (H) 65 - 99 mg/dL Final   01/04/2022 98 65 - 99 mg/dL Final   01/03/2022 98 65 - 99 mg/dL Final      eGFR, Non-   Date Value Ref Range Status   01/04/2022 GFR not calculated due to lack of steady state. >60 Final   01/03/2022 59 (L) >60 Final     eGFR,    Date Value Ref Range Status   01/04/2022 GFR not calculated due to lack of steady state. >60 Final   01/03/2022 68 >60 Final     Calcium   Date Value Ref Range Status   10/30/2024 8.2 (L) 8.4 - 10.2 mg/dL Final   10/29/2024 8.9 8.4 - 10.2 mg/dL Final   10/28/2024 8.6 8.4 - 10.2 mg/dL Final   02/20/2024 9.6 8.5 - 10.1 mg/dL Final   01/04/2022 8.8 8.5 - 10.1 mg/dL Final   01/03/2022 8.8 8.5 - 10.1 mg/dL Final     Albumin   Date Value Ref Range Status   10/30/2024 2.8 (L) 3.5 - 5.0 g/dL Final   10/29/2024 3.2 (L) 3.5 - 5.0 g/dL Final   10/28/2024 3.3 (L) 3.5 - 5.0 g/dL Final     ALBUMIN   Date Value Ref Range Status   02/20/2024 3.5 3.5 - 5.7 g/dL Final   01/04/2022 2.8 (L) 3.5 - 4.8 g/dL Final   01/03/2022 3.1 (L) 3.5 - 4.8 g/dL Final     Total Bilirubin   Date Value Ref Range Status   10/30/2024 0.34 0.20 - 1.00 mg/dL Final     Comment:     Use of this assay is not recommended for patients undergoing treatment with eltrombopag due to the potential for falsely elevated results.  N-acetyl-p-benzoquinone imine (metabolite of Acetaminophen) will generate erroneously low results in samples for patients that have taken an overdose of Acetaminophen.   10/29/2024 0.33 0.20 - 1.00 mg/dL Final     Comment:     Use of this assay is not recommended for patients undergoing treatment with eltrombopag due to the potential for falsely elevated results.  N-acetyl-p-benzoquinone imine (metabolite of Acetaminophen) will generate erroneously low results in samples for patients that have taken an overdose of Acetaminophen.   10/28/2024 0.23 0.20 - 1.00 mg/dL Final     Comment:     Use of this assay is not recommended for patients undergoing treatment with eltrombopag due to the potential for falsely elevated results.  N-acetyl-p-benzoquinone imine  "(metabolite of Acetaminophen) will generate erroneously low results in samples for patients that have taken an overdose of Acetaminophen.   02/20/2024 0.4 0.2 - 1.0 mg/dL Final     Comment:     Eltrombopag and its metabolites may interfere with this assay causing erroneously high patient results.   01/04/2022 0.5 0.2 - 1.0 mg/dL Final     Comment:     Use of this assay is not recommended for patients undergoing treatment with eltrombopag due to the potential for falsely elevated results.   01/03/2022 0.5 0.2 - 1.0 mg/dL Final     Comment:     Use of this assay is not recommended for patients undergoing treatment with eltrombopag due to the potential for falsely elevated results.     Alkaline Phosphatase   Date Value Ref Range Status   10/30/2024 103 34 - 104 U/L Final   10/29/2024 105 (H) 34 - 104 U/L Final   10/28/2024 91 34 - 104 U/L Final   02/20/2024 96 35 - 120 U/L Final   01/04/2022 119 35 - 120 U/L Final   01/03/2022 132 (H) 35 - 120 U/L Final     AST   Date Value Ref Range Status   10/30/2024 23 13 - 39 U/L Final   10/29/2024 21 13 - 39 U/L Final   10/28/2024 28 13 - 39 U/L Final   02/20/2024 83 (H) <41 U/L Final   01/04/2022 47 (H) <41 U/L Final   01/03/2022 40 <41 U/L Final     ALT   Date Value Ref Range Status   10/30/2024 22 7 - 52 U/L Final     Comment:     Specimen collection should occur prior to Sulfasalazine administration due to the potential for falsely depressed results.    10/29/2024 22 7 - 52 U/L Final     Comment:     Specimen collection should occur prior to Sulfasalazine administration due to the potential for falsely depressed results.    10/28/2024 26 7 - 52 U/L Final     Comment:     Specimen collection should occur prior to Sulfasalazine administration due to the potential for falsely depressed results.    02/20/2024 50 <56 U/L Final   01/04/2022 47 <56 U/L Final   01/03/2022 41 <56 U/L Final    No results found for: \"LDH\"   TSH   Date Value Ref Range Status   01/03/2022 1.58 0.36 - 3.74 " "uIU/mL Final    No results found for: \"E0LCEFK\" No results found for: \"FREET4\"     Patient was seen and discussed with attending physician, Ariadne Fontana M.D.    Carole Newman D.O.  Hematology-Oncology Fellow (PGY-5)   "

## 2024-10-30 NOTE — ASSESSMENT & PLAN NOTE
"-Briefly: Diagnosed in 7/2023 with colorectal cancer with mets to the liver  -Had a right hemicolectomy in 3/2024 due to partial obstruction.  Had a cryoablation of liver mets in 6/2024. Chemo.  -Per recent oncology OV note, CRC is at least locally advanced versus metastatic and is unresectable.   -Of note, interval PET/CT done in 8/2024 showed interval disease progression.  Consensus of follow-up tumor board was for patient to have reevaluation by ENT, radiation oncology, as well as definitive chemoradiation     Plan:  -See A&P under \"neoplastic malignant related fatigue\" and \"sensation choking\"   -Consults: Heme-onc, palliative care, nutrition   "

## 2024-10-30 NOTE — PROGRESS NOTES
Otolaryngology HN/FPRS Progress Note:    Subjective: Pt is 3 weeks s/p bilateral tonsillectomy, DL and nasopharyngeal biopsy. No acute events overnight. 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     Objective:   /92   Pulse 103   Temp 98.8 °F (37.1 °C)   Resp 16   SpO2 95%     Physical Exam   Gen: NAD, AAOx3  Neuro: CN II-XII grossly intact   Lungs: Breathing easily. No stertor or stridor. +Muffled voice.   CV: RRR. Good distal perfusion  Neck: Soft and flat. Tenderness noted with anterior neck region.     Wound: Bilateral tonsillectomy sites healing under normal course.     Assessment: 3 weeks s/p direct laryngoscopy, bilateral tonsillectomy and nasopharynx biopsy in the setting of known right tonsil p16+ SCCA ([id number]). Completed 6 chemotherapy cycles (Cisplatin) and radiation therapy, patient noted to be weak and ill-appearing with fatigue, uncontrolled pain and odynophagia.      Biopsy report from 10/9/24:  Final Diagnosis   A. NASOPHARYNX / OROPHARYNX, BIOPSY:    - Chronic rhinosinusitis.    - Negative for dysplasia or malignancy; see note.     B. TONSIL, RIGHT, TONSILLECTOMY:    - Reactive tonsillar tissue; see note.    - Negative for dysplasia or malignancy.     C. TONSIL, LEFT, TONSILLECTOMY:    - Reactive tonsillar tissue; see note.    - Negative for dysplasia or malignancy.            Plan:   - No acute surgical intervention from ENT stand point at this time, will sign off at this time, call with any questions  - Recommend radiation oncology consult to evaluate for ongoing radiation treatment  - Pain meds as per primary team  - Throat pain most likely from chemoRT and also to some extent with recent tonsillectomy  - Diet: unable to tolerate PO, continue nutrition via PEG tube     Cameron Flores BDS, DMD, MPA, MD   PGY-1  Otorhinolaryngology - Head & Neck Surgery  Please contact ENT Resident Epic  "Secure Chat Role for any questions or concerns.    ** Please Note: Portions of the record may have been created with voice recognition software. Occasional wrong word or \"sound a like\" substitutions may have occurred due to the inherent limitations of voice recognition software. There may also be notations and random deletions of words or characters from malfunctioning software. Read the chart carefully and recognize, using context, where substitutions/deletions have occurred.**    "

## 2024-10-30 NOTE — SPEECH THERAPY NOTE
Speech Language/Pathology  Speech-Language Pathology Screen      Patient Name: Maira Moura    Today's Date: 10/30/2024     Problem List  Principal Problem:    Neoplastic malignant related fatigue  Active Problems:    Primary hypertension    Mixed hyperlipidemia    Malignant neoplasm of right female breast (HCC)    GERD (gastroesophageal reflux disease)    Metastatic colon cancer to liver (HCC)    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HCC)    Chemotherapy induced neutropenia (HCC)    Stage 3a chronic kidney disease (HCC)    Sensation, choking    S/P percutaneous endoscopic gastrostomy (PEG) tube placement (HCC)      Past Medical History  Past Medical History:   Diagnosis Date    Anemia     Arthritis     Body mass index (BMI) 40.0-44.9, adult (HCC)     Breast cancer (HCC) 12/17/2018    Cancer (HCC)     right breast, colon, liver, right tonsil    Cervical lymphadenopathy     CT neck on 3/30/2023- Large right level 2A lymphadenopathy as described above and suspicious for neoplasm.  Correlation with histopathology is recommended.  Mild asymmetric prominence of the right palatine tonsil with otherwise no definitive nodular enhancing lesions identified along the course of the aerodigestive tract.     5/26/23- FNA of this node was nonrevealing for tissue etiology, but it d    Encounter for screening for HIV 07/07/2022    Follow-up examination 04/04/2023    GERD (gastroesophageal reflux disease)     Hyperlipidemia     Hypertension     Obesity     Stroke (HCC)     TIA     Stroke (HCC)     TIA     Transaminitis 09/22/2021    Vasomotor rhinitis     Refilled flonase today. Stopped taking since January 2022       Past Surgical History  Past Surgical History:   Procedure Laterality Date    BREAST BIOPSY Right 11/23/2018    us guided bx cancer    BREAST SURGERY      COLONOSCOPY      ESOPHAGOSCOPY N/A 07/20/2023    Procedure: ESOPHAGOSCOPY;  Surgeon: David Chapa MD;  [facility];  Service: ENT    HEMICOLOECTOMY  W/ ANASTOMOSIS Right 3/12/2024    Procedure: RIGHT COLECTOMY WITH ROBOTICS;  Surgeon: Vickie Israel MD;  [facility];  Service: Surgical Oncology    IR BIOPSY LIVER MASS  2023    IR CRYOABLATION  6/3/2024    IR GASTROSTOMY TUBE PLACEMENT  10/2/2024    IR PORT CHECK  2024    IR PORT PLACEMENT  2023    IR PORT STRIPPING  2024    LAPAROTOMY N/A 3/12/2024    Procedure: LAPAROTOMY EXPLORATORY W/ ROBOTICS;  Surgeon: Vickie Israel MD;  [facility];  Service: Surgical Oncology    CO BIOPSY NASOPHARYNX VISIBLE LESION SIMPLE N/A 10/9/2024    Procedure: NASOPHARYNGOSCOPY with biospy;  Surgeon: Juanito Matthew MD;  [facility];  Service: ENT    [facility] W/CELL WASHG SPX N/A 2023    Procedure: BRONCHOSCOPY;  Surgeon: David Chapa MD;  [facility];  Service: ENT    CO LARYNGOSCOPY W/BIOPSY MICROSCOPE/TELESCOPE N/A 2023    Procedure: MICRODIRECT LARYNGOSCOPY WITH BIOPSY;  Surgeon: David Chapa MD;  [facility];  Service: ENT    CO LARYNGOSCOPY W/WO TRACHEOSCOPY DX EXCEPT  N/A 10/9/2024    Procedure: DIRECT LARYNGOSCOPY;  Surgeon: Juanito Matthew MD;  [facility];  Service: ENT    CO MASTECTOMY PARTIAL W/AXILLARY LYMPHADENECTOMY Right 2018    Procedure: ULTRASOUND LOCALIZED PARTIAL MASTECTOMY W/SENTINEL NODE BIOPSY POSS AXILLARY DISSECTION;  Surgeon: Zahida Coronado MD;  Location: [facility];  Service: General    CO TONSILLECTOMY PRIMARY/SECONDARY AGE 13/> N/A 10/9/2024    Procedure: TONSILLECTOMY;  Surgeon: Juanito Matthew MD;  [facility];  Service: ENT    TUBAL LIGATION      US GUIDED BREAST BIOPSY RIGHT COMPLETE Right 2018    US GUIDED INJECTION FOR RESEARCH STUDY  2018    US GUIDED INJECTION FOR RESEARCH STUDY  2018    US GUIDED INJECTION FOR RESEARCH STUDY  2019    US GUIDED LYMPH NODE BIOPSY RIGHT  2023       Summary   Order received,  chart reviewed.  Pt's daughter present in room.  The pt at this time has no desire to eat, has too much pain and would like to solely rely on the PEG tube for nutrition.  Spoke w/ RD- pt is meeting all needs via the tube.  Education provided to continue w/ oral care as able and to f/u as needed w/ a repeat MBS when the pt feels ready to try po again. Ok to offer ice once home and pain has somewhat subsided to offer comfort if oral mucosa is dry.    Current Medical Status  Pt is a 69 y.o. female who presented to [facility] with chronic pain related to cancer treatment.  She has additional pain w/ swallowing.    Noted pt s/p tonsillectomy & DL and nasopharyngeal biopsy. 10/24/24.  She was presented at Multidisciplinary Head and Neck Tumor Board with a consensus for re-evaluation by Otolaryngology for DL with biopsy of left tonsil and Radiation Oncology to consider XRT.     Current Precautions:  Fall  Neutopenic    Allergies:  No known food allergies    Past medical history:  Please see H&P for details    Special Studies:  MBS 8/15/23  Summary:  Images are on PACS for review.   Oral stage: WNL/WFL Mastication was slightly prolonged but functional.  Patient wears upper dentures and has minimal frontal lower natural dentition.  Bolus control and formation were WNL.  Transfer was intermittently piecemeal.  Mild lingual residue.  Pharyngeal stage: WNL for prompt swallow, velar closure, hyoid excursion, laryngeal elevation, pharyngeal constriction, epiglottic inversion.  No/trace pharyngeal retention.  No penetration or aspiration.  Per gross esophageal screen: Mild stasis following solids.  Thin liquids cleared residual.  Pill cleared adequately.  Pt rec'd regular with thin.

## 2024-10-30 NOTE — CONSULTS
"Per 10/21 Outpatient RD note, \"TF Goal: 2 containers (474 mL) 3 times daily of Jevity 1.5. Water Flushin mL free water flush pre/post each bolus (360 mL water) + additional 150 mL water flushes 4 times daily (600 mL water).\" Confirmed that this is the home regimen with patient's daughter. Daughter denies any concerns with tolerance. Does not take a substantial amount PO due to pain in throat.     Recommend continuing Jevity 1.5 @ 474mL TID. Flush with 60mL before and after each bolus. Provide an additional 150mL flushes QID (600mL total). Provides 1422mL formula, 2133 calories, 91g protein, 1081mL water from TF formula, 2041mL total water including flushes.   Order adjusted for clarification purposes.   "

## 2024-10-30 NOTE — QUICK NOTE
Note patient was initially evaluated at bedside for evening rounds around 7: 30/8 PM (see quick note from earlier this evening documented around 8: 30 PM).    Patient arrived on floors around 8: 30 PM.  Around that time, was alerted by the floor nurse that patient was found to be tachycardic in the 100-110.  This was a new development compared to earlier this evening.  Went to bedside to also evaluate patient found that patient was consistently in the 100s to 110 range.  Patient had just received an oxycodone dose approximately an hour prior around 7:45 PM.  She continues to endorse the throat pain that has not changed from earlier this evening.  Daughter was also at bedside at this point.  She endorses that patient has been tolerating tube feeds via the G-tube pretty well without any N/V.  Patient was asleep on arrival and was easily arousable and was answering questions appropriately.    Given her neutropenic status and sudden change in her HR, gave her a bolus of fluids and started her empirically on Zosyn.  Already collected her UA as well as her blood culture.  Reviewed her labs thus far which showed lactic acid, Pro-Calc, anion gap all WNL.  Denies any photophobia, altered mental status outside of her low energy, no neck rigidity/pain outside of the anterior throat region related to her cancer.  Continues to be producing urine.    VS on arrival to floors: P 111, 98.3F    VS post-bolus:   /68 (BP Location: Left arm)   Pulse 103   Temp 98.3 °F (36.8 °C) (Oral)   Resp 16   SpO2 97%     Gen.: no acute distress  HEENT: Normocephalic, no conjunctival injection, jaundice. Mucous membranes moist, no lesions  Heart: Tachycardic, regular rhythm, no murmurs, rubs, or gallops  Lungs: Clear to auscultation bilaterally, no wheezing, rales, or rhonchi, no accessory muscle use; continues to have upper airway transmitted sounds.  Abdomen: Soft, nontender, nondistended, bowel sounds positive  Extremities: Warm and  well perfused; no lower extremity edema bilaterally  Skin: No rashes  Neuro: Was found to be asleep, was easily arousable.  Answered questions appropriately.  AAOx4.  Cranial nerves grossly intact.  Sensation grossly intact.  4 out of 5 strength x 4 extremities.  Negative Brudzinski and Kernig sign.      A/P: 69-year-old female who is a direct admit from her oncology office due to worsening fatigue with ongoing dysphagia in the setting of complex cancer history (current SCC of her tonsils, s/p tonsillectomy, G-tube placement).      Her new tachycardia in the setting of neutropenia, differentials include infection, dehydration, cancer related pain.  We already have in some initial infectious workup gathered.  Change CXR to stat as it was not obtained while patient was in the ED.  Promptly had given patient a bolus of fluids and we will continue her on maintenance IVF at 125 cc/h.  Made oncology aware; will also add on Zosyn for empiric antibiotics.  Added on neutropenic precautions.      Note her lactic acid, procalcitonin, anion gap were all WNL at this time.  No meningeal signs or symptoms at this time either.    Otherwise, strict I's and O's given patient recently got cisplatin.  Her GFR is around the 50s on admission.  Continue with as needed pain medications.  Otherwise encouraged as best as possible sleep hygiene/day and night hygiene.    Made nurse aware of full plan.  Patient's daughter Eliane was at bedside; explained everything and answered all questions/concerns.  Will continue to monitor closely overnight.    Porsha Figueroa, DO  PGY-2  [facility]

## 2024-10-30 NOTE — ASSESSMENT & PLAN NOTE
Found to be pancytopenic on adm, with WBC 1.76, Plt 131, Hgb 7.6, in the setting of chemoradiation therapy.   Hgb 6.2 on 10/30 AM labs; 6.2 on repeat.   Transfused with irradiated pRBC 10/30, tolerated well.   Continue to monitor CBC.  Continue to monitor VS.

## 2024-10-30 NOTE — PROGRESS NOTES
One unit RBC transfused over 2.5 hours. Patient assessed and vitals obtained before and after transfusion as well as at 5 minutes, 15 minutes, 30 minutes, 60 minutes, and 2 hours after blood at hub. No transfusion reactions noted. Assessment and vitals stable throughout transfusion.

## 2024-10-30 NOTE — CONSULTS
Medical Oncology/Hematology Consult Note  Maira Moura, female, 69 y.o., 1955,  /-[birth year], [id number]     Assessment and Plan:  1. 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 Acute pain 2/2 recent tonsillectomy (10/9/2024)  3. Hx of colon cancer metastatic to the liver (cT3, cN0, pM1)  4. Hx of Invasive ductal NST/invasive ductal carcinoma of the breast, %, OK 95%, HER2-, grade 2  - Holding chemoradiation  - Pain control:  - Scheduled: oxycodone 5 mg Q4H, gabapentin 300 AM/600 PM, Tylenol 650 Q6H  - PRN: Dilaudid, Tylenol, magic mouthwash, oxycodone 5 Q6H  - Continue anastrozole 1 mg PO daily  - Bowel regimen with docusate and Miralax  - Consults:  - Appreciate ENT recs: no acute surgical intervention, nutrition per PEG, rad onc consult, pain control  - Consulted rad onc, appreciate recs  - Consulted palliative, appreciate recs  - CT CAP w/ to evaluate liver lesions in setting of hx of colon cancer  - Will coordinate close interval follow up with medical oncology at discharge pending hospital course    4. Mild Neutropenia (ANC 1160, borderline temp, tachycardia)  - Empiric ceftriaxone and Flagyl    4.  Acute on chronic anemia, unspecified  - Transfuse for Hb <7  - Home B12 1000 daily  - Folic acid 1 mg daily  - Home B6 daily    4. HTN  - Home losartan 50    4. HLD  - Home atorvastatin 40    4. Depression  - Home mirtazapine 45    4. GERD  - Home pantoprazole 40    4.  Hypomagnesemia  - Replenish    Reason for consultation:  Direct admission for acute and severe post-operative pain (s/p tonsillectomy) in setting of chemoradiation    History of present illness:  Ms. Moura is a 69 yoF with PMHx of colon cancer, breast cancer, tonsillar cancer, anemia, arthritis, cervical lymphadenopathy, CVA, CKD3a, HTN, HLD, and obesity who presents as a direct admission from her outpatient medical oncologist's office for pain control and supportive care.    She has a complex  oncologic history, including synchronous de naveed metastatic adenocarcinoma of the mid-ascending colon (Complicated by partial-obstruction requiring right hemicolectomy on March 15th, 2024) with biopsy-proven oligometastatic disease to the liver (Status-post cryoablation on June 3rd, 2024), and unresectable (At least locally-advanced versus metastatic) p16-positive squamous cell carcinoma of the right tonsil.  PET-CT on August 5th, 2024 showing interval disease progression in the neck, patient's case was presented at the Multidisciplinary Head and Neck Tumor Board on August 12th, 2024, which culminated in consensus for re-evaluation by Otolaryngology and Radiation Oncology. Patient was recommended definitive chemoradiation. She has since initiated concurrent chemoradiation with weekly Cisplatin, and has completed six-cycles, thus far. Cycle 7 of Cisplatin 30 mg/m2 was discontinued, due to poor-tolerance of concurrent chemoradiation.  Significantly, the patient endorses intractable pain and fatigue associated with recent tonsillectomy (Performed on October 9th, 2024), further complicated by ongoing concurrent chemoradiation.  After evaluation by her medical oncologist, the patient was directly admitted to [facility] for supportive care, including intravenous-fluid resuscitation and pain-control.  She is now only able to take nutrition per PEG.    Review of Systems:   All ROS was performed and negative except as indicated in HPI.    Past Medical History:   Diagnosis Date    Anemia     Arthritis     Body mass index (BMI) 40.0-44.9, adult (HCC)     Breast cancer (HCC) 12/17/2018    Cancer (HCC)     right breast, colon, liver, right tonsil    Cervical lymphadenopathy     CT neck on 3/30/2023- Large right level 2A lymphadenopathy as described above and suspicious for neoplasm.  Correlation with histopathology is recommended.  Mild asymmetric prominence of the right palatine tonsil with otherwise no  definitive nodular enhancing lesions identified along the course of the aerodigestive tract.     23- FNA of this node was nonrevealing for tissue etiology, but it d    Encounter for screening for HIV 2022    Follow-up examination 2023    GERD (gastroesophageal reflux disease)     Hyperlipidemia     Hypertension     Obesity     Stroke (HCC)     TIA     Stroke (HCC)     TIA     Transaminitis 2021    Vasomotor rhinitis     Refilled flonase today. Stopped taking since 2022     Past Surgical History:   Procedure Laterality Date    BREAST BIOPSY Right 2018    us guided bx cancer    BREAST SURGERY      COLONOSCOPY      ESOPHAGOSCOPY N/A 2023    Procedure: ESOPHAGOSCOPY;  Surgeon: David Chapa MD;  [facility];  Service: ENT    HEMICOLOECTOMY W/ ANASTOMOSIS Right 3/12/2024    Procedure: RIGHT COLECTOMY WITH ROBOTICS;  Surgeon: Vickie Israel MD;  [facility];  Service: Surgical Oncology    IR BIOPSY LIVER MASS  2023    IR CRYOABLATION  6/3/2024    IR GASTROSTOMY TUBE PLACEMENT  10/2/2024    IR PORT CHECK  2024    IR PORT PLACEMENT  2023    IR PORT STRIPPING  2024    LAPAROTOMY N/A 3/12/2024    Procedure: LAPAROTOMY EXPLORATORY W/ ROBOTICS;  Surgeon: Vickie Israel MD;  [facility];  Service: Surgical Oncology    FL BIOPSY NASOPHARYNX VISIBLE LESION SIMPLE N/A 10/9/2024    Procedure: NASOPHARYNGOSCOPY with biospy;  Surgeon: Juanito Matthew MD;  [facility];  Service: ENT    [facility] W/CELL WASHG SPX N/A 2023    Procedure: BRONCHOSCOPY;  Surgeon: David Chapa MD;  [facility];  Service: ENT    FL LARYNGOSCOPY W/BIOPSY MICROSCOPE/TELESCOPE N/A 2023    Procedure: MICRODIRECT LARYNGOSCOPY WITH BIOPSY;  Surgeon: David Chapa MD;  [facility];  Service: ENT    FL LARYNGOSCOPY W/WO TRACHEOSCOPY DX EXCEPT  N/A 10/9/2024    Procedure: DIRECT  LARYNGOSCOPY;  Surgeon: Juanito Matthew MD;  [facility];  Service: ENT    AK MASTECTOMY PARTIAL W/AXILLARY LYMPHADENECTOMY Right 12/20/2018    Procedure: ULTRASOUND LOCALIZED PARTIAL MASTECTOMY W/SENTINEL NODE BIOPSY POSS AXILLARY DISSECTION;  Surgeon: Zahida Coronado MD;  Location: [facility];  Service: General    AK TONSILLECTOMY PRIMARY/SECONDARY AGE 12/> N/A 10/9/2024    Procedure: TONSILLECTOMY;  Surgeon: Juanito Matthew MD;  [facility];  Service: ENT    TUBAL LIGATION      US GUIDED BREAST BIOPSY RIGHT COMPLETE Right 11/23/2018    US GUIDED INJECTION FOR RESEARCH STUDY  12/20/2018    US GUIDED INJECTION FOR RESEARCH STUDY  12/07/2018    US GUIDED INJECTION FOR RESEARCH STUDY  05/03/2019    US GUIDED LYMPH NODE BIOPSY RIGHT  05/26/2023     Family History   Problem Relation Age of Onset    Colon cancer Mother 58    Hypertension Mother     Pancreatic cancer Father 60    Hypertension Daughter     Hypertension Son      Social History     Socioeconomic History    Marital status: Single     Spouse name: None    Number of children: None    Years of education: None    Highest education level: None   Occupational History    None   Tobacco Use    Smoking status: Never     Passive exposure: Never    Smokeless tobacco: Never    Tobacco comments:     NO TOBACCO USE   Vaping Use    Vaping status: Never Used   Substance and Sexual Activity    Alcohol use: Never    Drug use: Never    Sexual activity: None   Other Topics Concern    None   Social History Narrative    None     Social Determinants of Health     Financial Resource Strain: Low Risk  (10/9/2023)    Overall Financial Resource Strain (CARDIA)     Difficulty of Paying Living Expenses: Not hard at all   Food Insecurity: No Food Insecurity (10/29/2024)    Nursing - Inadequate Food Risk Classification     Worried About Running Out of Food in the Last Year: Never true     Ran Out of Food in the Last Year: Never true     Ran Out of Food in the  Last Year: 1   Transportation Needs: Unmet Transportation Needs (10/29/2024)    Nursing - Transportation Risk Classification     Lack of Transportation: Not on file     Lack of Transportation: 1   Physical Activity: Not on file   Stress: Not on file   Social Connections: Not on file   Intimate Partner Violence: Unknown (10/29/2024)    Nursing IPS     Feels Physically and Emotionally Safe: Not on file     Physically Hurt by Someone: Not on file     Humiliated or Emotionally Abused by Someone: Not on file     Physically Hurt by Someone: 2     Hurt or Threatened by Someone: 2   Housing Stability: Unknown (10/29/2024)    Nursing: Inadequate Housing Risk Classification     Has Housing: Not on file     Worried About Losing Housing: Not on file     Unable to Get Utilities: Not on file     Unable to Pay for Housing in the Last Year: 2     Has Housin     Current Facility-Administered Medications:     acetaminophen (TYLENOL) oral suspension 325 mg, 325 mg, Per G Tube, Q6H PRN, Conor Herr MD    anastrozole (ARIMIDEX) tablet 1 mg, 1 mg, Per G Tube, Daily, Porsha Figueroa DO    atorvastatin (LIPITOR) tablet 40 mg, 40 mg, Per G Tube, HS, Porsha Figueroa DO, 40 mg at 10/29/24 2203    cyanocobalamin (VITAMIN B-12) tablet 1,000 mcg, 1,000 mcg, Per G Tube, Daily, Porsha Figueroa DO    diphenhydramine, lidocaine, Al/Mg hydroxide, simethicone (Magic Mouthwash) oral solution 10 mL, 10 mL, Swish & Swallow, Q4H PRN, Conor Herr MD    docusate (COLACE) oral liquid 50 mg, 50 mg, Oral, BID, Porsha Figueroa DO, 50 mg at 10/29/24 2210    enoxaparin (LOVENOX) subcutaneous injection 40 mg, 40 mg, Subcutaneous, Daily, Conor Herr MD    ergocalciferol (VITAMIN D2) capsule 50,000 Units, 50,000 Units, Oral, Weekly, Conor Herr MD    folic acid (FOLVITE) tablet 1 mg, 1 mg, Per G Tube, Daily, Porsha Figueroa DO    gabapentin (NEURONTIN) oral solution 300 mg, 300 mg, Per G Tube, Daily, Porsha Figueroa DO    gabapentin (NEURONTIN)  oral solution 600 mg, 600 mg, Per G Tube, HS, Porsha Figueroa DO, 600 mg at 10/29/24 2203    HYDROmorphone (DILAUDID) injection 0.5 mg, 0.5 mg, Intravenous, Q2H PRN, Conor Herr MD, 0.5 mg at 10/29/24 2212    influenza vaccine, high-dose (Fluzone High-Dose) IM injection 0.5 mL, 0.5 mL, Intramuscular, Once, Neena Patricia MD    losartan (COZAAR) tablet 50 mg, 50 mg, Per G Tube, Daily, Porsha Figueroa DO    magnesium sulfate 2 g/50 mL IVPB (premix) 2 g, 2 g, Intravenous, Once, Conor Herr MD    mirtazapine (REMERON) tablet 45 mg, 45 mg, Per G Tube, HS, Porsha Figueroa DO, 45 mg at 10/29/24 2202    ondansetron (ZOFRAN) oral solution 4 mg, 4 mg, Per G Tube, Q4H PRN, Porsha Figueroa DO    oxyCODONE (ROXICODONE) oral solution 5 mg, 5 mg, Per G Tube, Q6H PRN, Porsha Figueroa DO    pantoprazole (PROTONIX) injection 40 mg, 40 mg, Intravenous, Q24H RAFFY, Porsha Figueroa DO    [COMPLETED] piperacillin-tazobactam (ZOSYN) 4.5 g in sodium chloride 0.9 % 100 mL IV LOADING DOSE, 4.5 g, Intravenous, Once, Stopped at 10/30/24 0209 **FOLLOWED BY** piperacillin-tazobactam (ZOSYN) 4.5 g in sodium chloride 0.9 % 100 mL IVPB (EXTENDED INFUSION), 4.5 g, Intravenous, Q8H, Porsha Figueroa DO, Last Rate: 25 mL/hr at 10/30/24 0229, 4.5 g at 10/30/24 0229    polyethylene glycol (MIRALAX) packet 17 g, 17 g, Per G Tube, Daily, Porsha Figueroa DO    potassium chloride oral solution 20 mEq, 20 mEq, Per G Tube, BID, Porsha Figueroa DO    pyridoxine (VITAMIN B6) tablet 50 mg, 50 mg, Per G Tube, Daily, Porsha Figueroa DO    sodium chloride 0.9 % infusion, 125 mL/hr, Intravenous, Continuous, Porsha Figueroa DO, Last Rate: 125 mL/hr at 10/29/24 2328, 125 mL/hr at 10/29/24 2328    Medications Prior to Admission:     acetaminophen (TYLENOL) 160 mg/5 mL liquid    anastrozole (ARIMIDEX) 1 mg tablet    atorvastatin (LIPITOR) 40 mg tablet    diphenhydramine, lidocaine, Al/Mg hydroxide, simethicone (Magic Mouthwash) SUSP    docusate sodium (COLACE) 50 mg capsule     ergocalciferol (VITAMIN D2) 50,000 units    folic acid (FOLVITE) 1 mg tablet    gabapentin (NEURONTIN) 300 mg capsule    hydrocortisone 1 % ointment    ibuprofen (MOTRIN) 100 mg/5 mL suspension    lidocaine-prilocaine (EMLA) cream    losartan (COZAAR) 50 mg tablet    mirtazapine (REMERON) 15 mg tablet    mirtazapine (REMERON) 30 mg tablet    omeprazole (PriLOSEC) 20 mg delayed release capsule    ondansetron (ZOFRAN) 8 mg tablet    oxyCODONE (ROXICODONE) 5 mg/5 mL solution    potassium chloride (Klor-Con M20) 20 mEq tablet    prochlorperazine (COMPAZINE) 10 mg tablet    pyridoxine (VITAMIN B6) 50 mg tablet    vitamin B-12 (VITAMIN B-12) 1,000 mcg tablet    No Known Allergies    Physical Exam:    /94   Pulse 105   Temp 99 °F (37.2 °C)   Resp 18   SpO2 99%     General: WDWN, ill-appearing, acute distress  HEENT: Marked pharyngeal erythema without exudates,PERRLA, moist mucosa, atraumatic  Neck: Normal habitus, no masses, no cervical/supraclavicular lymphadenopathy  Respiratory: CTAB w/o wheezes, rales, or rhonchi, no increased work of breathing  Cardiovascular: RRR w/o murmurs, 2+ radial and pedal pulses, no b/l LE edema  Abdomen: soft, obese, non-tender, non-distended, no hepatomegaly or splenomegaly  /Rectal: deferred  Musculoskeletal: moves all four extremities with normal strength and ROM  Integumentary: warm, dry, no rash or bruising  Neurological: no gross or focal deficits identified, normal sensation  Psychiatric: pleasant and cooperative with normal mood, affect, and cognition    Recent Results (from the past 48 hour(s))   CBC and differential    Collection Time: 10/28/24 11:12 AM   Result Value Ref Range    WBC 1.58 (L) 4.31 - 10.16 Thousand/uL    RBC 2.53 (L) 3.81 - 5.12 Million/uL    Hemoglobin 7.0 (L) 11.5 - 15.4 g/dL    Hematocrit 21.7 (L) 34.8 - 46.1 %    MCV 86 82 - 98 fL    MCH 27.7 26.8 - 34.3 pg    MCHC 32.3 31.4 - 37.4 g/dL    RDW 17.4 (H) 11.6 - 15.1 %    MPV 10.5 8.9 - 12.7 fL     Platelets 154 149 - 390 Thousands/uL   Comprehensive metabolic panel    Collection Time: 10/28/24 11:12 AM   Result Value Ref Range    Sodium 142 135 - 147 mmol/L    Potassium 3.6 3.5 - 5.3 mmol/L    Chloride 104 96 - 108 mmol/L    CO2 31 21 - 32 mmol/L    ANION GAP 7 4 - 13 mmol/L    BUN 31 (H) 5 - 25 mg/dL    Creatinine 1.10 0.60 - 1.30 mg/dL    Glucose 119 65 - 140 mg/dL    Calcium 8.6 8.4 - 10.2 mg/dL    Corrected Calcium 9.2 8.3 - 10.1 mg/dL    AST 28 13 - 39 U/L    ALT 26 7 - 52 U/L    Alkaline Phosphatase 91 34 - 104 U/L    Total Protein 7.3 6.4 - 8.4 g/dL    Albumin 3.3 (L) 3.5 - 5.0 g/dL    Total Bilirubin 0.23 0.20 - 1.00 mg/dL    eGFR 51 ml/min/1.73sq m   Magnesium    Collection Time: 10/28/24 11:12 AM   Result Value Ref Range    Magnesium 1.3 (L) 1.9 - 2.7 mg/dL   Manual Differential(PHLEBS Do Not Order)    Collection Time: 10/28/24 11:12 AM   Result Value Ref Range    Segmented % 65 43 - 75 %    Bands % 5 0 - 8 %    Lymphocytes % 21 14 - 44 %    Monocytes % 9 4 - 12 %    Eosinophils % 0 0 - 6 %    Basophils % 0 0 - 1 %    Absolute Neutrophils 1.11 (L) 1.85 - 7.62 Thousand/uL    Absolute Lymphocytes 0.33 (L) 0.60 - 4.47 Thousand/uL    Absolute Monocytes 0.14 0.00 - 1.22 Thousand/uL    Absolute Eosinophils 0.00 0.00 - 0.40 Thousand/uL    Absolute Basophils 0.00 0.00 - 0.10 Thousand/uL    Total Counted      Platelet Estimate Adequate Adequate    Anisocytosis Present    CBC and differential    Collection Time: 10/29/24  1:19 PM   Result Value Ref Range    WBC 1.53 (L) 4.31 - 10.16 Thousand/uL    RBC 2.64 (L) 3.81 - 5.12 Million/uL    Hemoglobin 7.6 (L) 11.5 - 15.4 g/dL    Hematocrit 23.1 (L) 34.8 - 46.1 %    MCV 88 82 - 98 fL    MCH 28.8 26.8 - 34.3 pg    MCHC 32.9 31.4 - 37.4 g/dL    RDW 17.6 (H) 11.6 - 15.1 %    MPV 9.9 8.9 - 12.7 fL    Platelets 133 (L) 149 - 390 Thousands/uL    nRBC 0 /100 WBCs    Segmented % 70 43 - 75 %    Immature Grans % 0 0 - 2 %    Lymphocytes % 12 (L) 14 - 44 %    Monocytes % 16  (H) 4 - 12 %    Eosinophils Relative 1 0 - 6 %    Basophils Relative 1 0 - 1 %    Absolute Neutrophils 1.07 (L) 1.85 - 7.62 Thousands/µL    Absolute Immature Grans 0.00 0.00 - 0.20 Thousand/uL    Absolute Lymphocytes 0.19 (L) 0.60 - 4.47 Thousands/µL    Absolute Monocytes 0.24 0.17 - 1.22 Thousand/µL    Eosinophils Absolute 0.02 0.00 - 0.61 Thousand/µL    Basophils Absolute 0.01 0.00 - 0.10 Thousands/µL   Comprehensive metabolic panel    Collection Time: 10/29/24  1:19 PM   Result Value Ref Range    Sodium 143 135 - 147 mmol/L    Potassium 4.0 3.5 - 5.3 mmol/L    Chloride 105 96 - 108 mmol/L    CO2 31 21 - 32 mmol/L    ANION GAP 7 4 - 13 mmol/L    BUN 32 (H) 5 - 25 mg/dL    Creatinine 1.00 0.60 - 1.30 mg/dL    Glucose 102 65 - 140 mg/dL    Calcium 8.9 8.4 - 10.2 mg/dL    Corrected Calcium 9.5 8.3 - 10.1 mg/dL    AST 21 13 - 39 U/L    ALT 22 7 - 52 U/L    Alkaline Phosphatase 105 (H) 34 - 104 U/L    Total Protein 6.9 6.4 - 8.4 g/dL    Albumin 3.2 (L) 3.5 - 5.0 g/dL    Total Bilirubin 0.33 0.20 - 1.00 mg/dL    eGFR 58 ml/min/1.73sq m   Procalcitonin    Collection Time: 10/29/24  1:19 PM   Result Value Ref Range    Procalcitonin 0.14 <=0.25 ng/ml   ECG 12 lead    Collection Time: 10/29/24  1:34 PM   Result Value Ref Range    Ventricular Rate 101 BPM    Atrial Rate 101 BPM    NM Interval 138 ms    QRSD Interval 66 ms    QT Interval 314 ms    QTC Interval 407 ms    P Axis 74 degrees    QRS Axis 67 degrees    T Wave Axis 46 degrees   COVID/FLU/RSV    Collection Time: 10/29/24  7:48 PM    Specimen: Nose; Nares   Result Value Ref Range    SARS-CoV-2 Negative Negative    INFLUENZA A PCR Negative Negative    INFLUENZA B PCR Negative Negative    RSV PCR Negative Negative   Platelet count    Collection Time: 10/29/24  7:54 PM   Result Value Ref Range    Platelets 147 (L) 149 - 390 Thousands/uL    MPV 9.9 8.9 - 12.7 fL   Blood culture    Collection Time: 10/29/24  7:54 PM    Specimen: Arm, Right; Blood   Result Value Ref Range     Blood Culture Received in Microbiology Lab. Culture in Progress.    Lactic acid, plasma (w/reflex if result > 2.0)    Collection Time: 10/29/24  7:54 PM   Result Value Ref Range    LACTIC ACID 1.3 0.5 - 2.0 mmol/L   Prealbumin    Collection Time: 10/29/24  7:54 PM   Result Value Ref Range    Prealbumin 14.9 (L) 17.0 - 34.0 mg/dL   Comprehensive metabolic panel    Collection Time: 10/30/24  4:34 AM   Result Value Ref Range    Sodium 142 135 - 147 mmol/L    Potassium 3.8 3.5 - 5.3 mmol/L    Chloride 104 96 - 108 mmol/L    CO2 30 21 - 32 mmol/L    ANION GAP 8 4 - 13 mmol/L    BUN 26 (H) 5 - 25 mg/dL    Creatinine 1.04 0.60 - 1.30 mg/dL    Glucose 102 65 - 140 mg/dL    Glucose, Fasting 102 (H) 65 - 99 mg/dL    Calcium 8.2 (L) 8.4 - 10.2 mg/dL    Corrected Calcium 9.2 8.3 - 10.1 mg/dL    AST 23 13 - 39 U/L    ALT 22 7 - 52 U/L    Alkaline Phosphatase 103 34 - 104 U/L    Total Protein 6.3 (L) 6.4 - 8.4 g/dL    Albumin 2.8 (L) 3.5 - 5.0 g/dL    Total Bilirubin 0.34 0.20 - 1.00 mg/dL    eGFR 55 ml/min/1.73sq m   Magnesium    Collection Time: 10/30/24  4:34 AM   Result Value Ref Range    Magnesium 1.4 (L) 1.9 - 2.7 mg/dL   Phosphorus    Collection Time: 10/30/24  4:34 AM   Result Value Ref Range    Phosphorus 3.3 2.3 - 4.1 mg/dL   CBC and differential    Collection Time: 10/30/24  4:34 AM   Result Value Ref Range    WBC 1.76 (L) 4.31 - 10.16 Thousand/uL    RBC 2.17 (L) 3.81 - 5.12 Million/uL    Hemoglobin 6.2 (L) 11.5 - 15.4 g/dL    Hematocrit 19.1 (L) 34.8 - 46.1 %    MCV 88 82 - 98 fL    MCH 28.6 26.8 - 34.3 pg    MCHC 32.5 31.4 - 37.4 g/dL    RDW 17.6 (H) 11.6 - 15.1 %    MPV 9.3 8.9 - 12.7 fL    Platelets 131 (L) 149 - 390 Thousands/uL    nRBC 0 /100 WBCs    Segmented % 65 43 - 75 %    Immature Grans % 1 0 - 2 %    Lymphocytes % 15 14 - 44 %    Monocytes % 16 (H) 4 - 12 %    Eosinophils Relative 2 0 - 6 %    Basophils Relative 1 0 - 1 %    Absolute Neutrophils 1.16 (L) 1.85 - 7.62 Thousands/µL    Absolute Immature  Grans 0.01 0.00 - 0.20 Thousand/uL    Absolute Lymphocytes 0.27 (L) 0.60 - 4.47 Thousands/µL    Absolute Monocytes 0.28 0.17 - 1.22 Thousand/µL    Eosinophils Absolute 0.03 0.00 - 0.61 Thousand/µL    Basophils Absolute 0.01 0.00 - 0.10 Thousands/µL   Procalcitonin, Next Day AM Collection    Collection Time: 10/30/24  4:34 AM   Result Value Ref Range    Procalcitonin 0.17 <=0.25 ng/ml   Hemoglobin and hematocrit, blood    Collection Time: 10/30/24  7:49 AM   Result Value Ref Range    Hemoglobin 6.2 (L) 11.5 - 15.4 g/dL    Hematocrit 19.2 (L) 34.8 - 46.1 %     XR chest pa and lateral    Result Date: 10/30/2024  Narrative: XR CHEST PA AND LATERAL INDICATION: infectious work up, r/o aspiration. COMPARISON: PET/CT 8/5/2024, CXR 2/21/2024. FINDINGS: Right port in mid SVC. Clear lungs. No pneumothorax or pleural effusion. Normal cardiomediastinal silhouette. Bones are unremarkable for age. Normal upper abdomen.     Impression: No acute cardiopulmonary disease. Workstation performed: [id number]     CT soft tissue neck with contrast    Result Date: 10/29/2024  Narrative: CT NECK WITH CONTRAST INDICATION:   Head and neck cancer, left neck pain. COMPARISON: PET/CT from August 5, 2024 and CT neck from March 30, 2023 TECHNIQUE:  Axial, sagittal, and coronal 2D reformatted images were created from the axial source data and submitted for interpretation. Radiation dose length product (DLP) for this visit:  508.76 mGy-cm .  This examination, like all CT scans performed in the [facility], was performed utilizing techniques to minimize radiation dose exposure, including the use of iterative  reconstruction and automated exposure control. IV Contrast:  85 mL of iohexol (OMNIPAQUE) IMAGE QUALITY:  Diagnostic. FINDINGS: VISUALIZED BRAIN PARENCHYMA:  No acute intracranial pathology of the visualized brain parenchyma. VISUALIZED ORBITS: Normal visualized orbits. PARANASAL SINUSES: Right sphenoid sinus and bilateral ethmoid  sinus mucosal disease. Trace right mastoid fluid without coalescence. NASAL CAVITY AND NASOPHARYNX: No contour deforming lesions identified. Mildly leftward deviated nasal septum. SUPRAHYOID/INFRAHYOID NECK: Post radiation changes with induration in the subcutaneous fat and effacement of the oropharyngeal and supraglottic laryngeal fat planes. No evidence for recurrent or residual right tonsillar mass. No other contour deforming primary aerodigestive tract lesions. THYROID GLAND: Right thyroid nodules, similar to prior CT from 3/30/2023, and better evaluated by ultrasound PAROTID AND SUBMANDIBULAR GLANDS: Mildly atrophic submandibular gland suggesting prior radiation therapy. Normal parotid glands. LYMPH NODES: Ill-defined treated right level 2 claudio metastasis (series 2 image 43) measuring 1.8 x 1.8 cm. On the initial exam from 3/30/2023, this measured 3.7 x 2.2 cm, and is difficult to measure on recent PET/CT. 1.3 x 0.7 cm right level 3 claudio metastasis (series 2 image 49) grossly stable compared to recent PET/CT. No new adenopathy. Scattered subcentimeter shotty left cervical lymph nodes are similar to prior exam. VASCULAR STRUCTURES:  Normal enhancement of the cervical vasculature. THORACIC INLET: Right chest Port-A-Cath. BONY STRUCTURES: No acute fracture or destructive osseous lesion.     Impression: Posttreatment changes as described without evidence for disease progression compared to PET/CT from 8/5/2024. No acute abnormality. Workstation performed: [id number]     IR gastrostomy tube placement    Result Date: 10/2/2024  Narrative: PROCEDURE: Gastrostomy tube placement Procedural Personnel Attending physician(s): Dr. Mosher Pre-procedure diagnosis: Right tonsillar squamous cell carcinoma Post-procedure diagnosis: Same Indication: Patient with right tonsillar squamous cell carcinoma and dysphagia. PROCEDURE SUMMARY: - Gastrostomy tube placement under fluoroscopic guidance PROCEDURE DETAILS: Pre-procedure Consent:  Informed consent for the procedure including risks, benefits and alternatives was obtained and time-out was performed prior to the procedure. Preparation: The site was prepared and draped using maximal sterile barrier technique including cutaneous antisepsis. Anesthesia/sedation Level of anesthesia/sedation: Monitored anesthesia care Anesthesia/sedation administered by: Anesthesiology Total intra-service sedation time (minutes): 60 Gastrostomy tube placement The patient was positioned supine. Nasogastric tube was placed by anesthesiology. Following administration of 1 mg glucagon IV, the stomach was inflated with air through the NG tube. 3 T-tacks were placed to secure the body of stomach up against the abdominal wall. 1 cm incision was made at the center of the T-tacks. 19-gauge needle was advanced into the gastric lumen through the incision and a 0.035 inch wire advanced through the needle into the gastric lumen. Tract was serially dilated using telescoping peel-away sheath. 18 Kenyan gastrostomy tube was advanced through the peel-away sheath, followed by sheath removal. G-tube balloon was inflated using 8 mL sterile water. Contrast was injected through the gastrostomy tube to confirm proper positioning. Contrast Contrast agent: Omnipaque Contrast volume (mL): 10 Radiation Dose Fluoroscopy time (minutes): 6.1 Reference air kerma (mGy): 104 Additional Details Estimated blood loss (mL): None Complications: No immediate complications.     Impression: 18 Kenyan gastrostomy tube placement. Plan: Return in 6 months for routine tube exchange. Workstation performed: [id number]     Labs and pertinent reports reviewed.      This note has been generated by voice recognition software system.  Therefore, there may be spelling, grammar, and or syntax errors. Please contact if questions arise.    Additional recommendations to follow per attending, Dr. Leslie.    Heriberto Sequeira DO, PGY4  Hematology/Oncology Fellow

## 2024-10-30 NOTE — ASSESSMENT & PLAN NOTE
-WBC and ANC in the 1's    WCB & ANC     Results from last 7 days   Lab Units 11/02/24  0523 11/01/24  1507 10/31/24  0638 10/30/24  0434 10/29/24  1319 10/28/24  1112   WBC Thousand/uL 1.97* 1.86* 1.65* 1.76* 1.53* 1.58*   TOTAL NEUT ABS Thousands/µL 1.28*  1.14* 1.40*  --  1.16* 1.07*  --      Morning labs pending 11/1    Plan:  -Neutropenic precautions  -Continue to monitor WBC, temperature, VS, S/S of infection

## 2024-10-31 ENCOUNTER — APPOINTMENT (OUTPATIENT)
Dept: RADIATION ONCOLOGY | Facility: CLINIC | Age: 69
End: 2024-10-31
Attending: RADIOLOGY
Payer: MEDICARE

## 2024-10-31 LAB
ABO GROUP BLD BPU: NORMAL
ABO GROUP BLD BPU: NORMAL
ALBUMIN SERPL BCG-MCNC: 2.9 G/DL (ref 3.5–5)
ALP SERPL-CCNC: 104 U/L (ref 34–104)
ALT SERPL W P-5'-P-CCNC: 28 U/L (ref 7–52)
ANION GAP SERPL CALCULATED.3IONS-SCNC: 7 MMOL/L (ref 4–13)
AST SERPL W P-5'-P-CCNC: 32 U/L (ref 13–39)
BILIRUB SERPL-MCNC: 0.57 MG/DL (ref 0.2–1)
BPU ID: NORMAL
BPU ID: NORMAL
BUN SERPL-MCNC: 25 MG/DL (ref 5–25)
CALCIUM ALBUM COR SERPL-MCNC: 9.4 MG/DL (ref 8.3–10.1)
CALCIUM SERPL-MCNC: 8.5 MG/DL (ref 8.4–10.2)
CHLORIDE SERPL-SCNC: 104 MMOL/L (ref 96–108)
CO2 SERPL-SCNC: 30 MMOL/L (ref 21–32)
CREAT SERPL-MCNC: 0.96 MG/DL (ref 0.6–1.3)
CROSSMATCH: NORMAL
CROSSMATCH: NORMAL
ERYTHROCYTE [DISTWIDTH] IN BLOOD BY AUTOMATED COUNT: 16.3 % (ref 11.6–15.1)
GFR SERPL CREATININE-BSD FRML MDRD: 60 ML/MIN/1.73SQ M
GLUCOSE SERPL-MCNC: 114 MG/DL (ref 65–140)
HCT VFR BLD AUTO: 25.9 % (ref 34.8–46.1)
HCT VFR BLD AUTO: 38.2 % (ref 34.8–46.1)
HEMOCCULT STL QL: NEGATIVE
HEMOCCULT STL QL: NORMAL
HEMOCCULT STL QL: NORMAL
HGB BLD-MCNC: 12.8 G/DL (ref 11.5–15.4)
HGB BLD-MCNC: 8.7 G/DL (ref 11.5–15.4)
MAGNESIUM SERPL-MCNC: 1.7 MG/DL (ref 1.9–2.7)
MCH RBC QN AUTO: 29.2 PG (ref 26.8–34.3)
MCHC RBC AUTO-ENTMCNC: 33.6 G/DL (ref 31.4–37.4)
MCV RBC AUTO: 87 FL (ref 82–98)
PLATELET # BLD AUTO: 139 THOUSANDS/UL (ref 149–390)
PMV BLD AUTO: 10 FL (ref 8.9–12.7)
POTASSIUM SERPL-SCNC: 4 MMOL/L (ref 3.5–5.3)
PROT SERPL-MCNC: 6.8 G/DL (ref 6.4–8.4)
RBC # BLD AUTO: 2.98 MILLION/UL (ref 3.81–5.12)
SODIUM SERPL-SCNC: 141 MMOL/L (ref 135–147)
UNIT DISPENSE STATUS: NORMAL
UNIT DISPENSE STATUS: NORMAL
UNIT PRODUCT CODE: NORMAL
UNIT PRODUCT CODE: NORMAL
UNIT PRODUCT VOLUME: 350 ML
UNIT PRODUCT VOLUME: 350 ML
UNIT RH: NORMAL
UNIT RH: NORMAL
WBC # BLD AUTO: 1.65 THOUSAND/UL (ref 4.31–10.16)

## 2024-10-31 PROCEDURE — 99233 SBSQ HOSP IP/OBS HIGH 50: CPT | Performed by: STUDENT IN AN ORGANIZED HEALTH CARE EDUCATION/TRAINING PROGRAM

## 2024-10-31 PROCEDURE — 99232 SBSQ HOSP IP/OBS MODERATE 35: CPT | Performed by: FAMILY MEDICINE

## 2024-10-31 PROCEDURE — 83615 LACTATE (LD) (LDH) ENZYME: CPT

## 2024-10-31 PROCEDURE — 85018 HEMOGLOBIN: CPT

## 2024-10-31 PROCEDURE — 85027 COMPLETE CBC AUTOMATED: CPT

## 2024-10-31 PROCEDURE — 85014 HEMATOCRIT: CPT

## 2024-10-31 PROCEDURE — 83735 ASSAY OF MAGNESIUM: CPT

## 2024-10-31 PROCEDURE — 80053 COMPREHEN METABOLIC PANEL: CPT

## 2024-10-31 PROCEDURE — 82272 OCCULT BLD FECES 1-3 TESTS: CPT

## 2024-10-31 RX ADMIN — DOCUSATE SODIUM LIQUID 50 MG: 50 LIQUID ORAL at 08:34

## 2024-10-31 RX ADMIN — PYRIDOXINE HCL TAB 50 MG 50 MG: 50 TAB at 08:34

## 2024-10-31 RX ADMIN — MAGNESIUM SULFATE HEPTAHYDRATE 2 G: 40 INJECTION, SOLUTION INTRAVENOUS at 04:32

## 2024-10-31 RX ADMIN — METRONIDAZOLE 500 MG: 500 TABLET ORAL at 04:36

## 2024-10-31 RX ADMIN — GABAPENTIN 600 MG: 250 SOLUTION ORAL at 21:08

## 2024-10-31 RX ADMIN — FOLIC ACID 1 MG: 1 TABLET ORAL at 08:32

## 2024-10-31 RX ADMIN — CYANOCOBALAMIN TAB 500 MCG 1000 MCG: 500 TAB at 08:32

## 2024-10-31 RX ADMIN — MIRTAZAPINE 45 MG: 30 TABLET, FILM COATED ORAL at 21:08

## 2024-10-31 RX ADMIN — CEFTRIAXONE SODIUM 2000 MG: 10 INJECTION, POWDER, FOR SOLUTION INTRAVENOUS at 16:32

## 2024-10-31 RX ADMIN — ACETAMINOPHEN 650 MG: 650 SUSPENSION ORAL at 18:34

## 2024-10-31 RX ADMIN — LOSARTAN POTASSIUM 50 MG: 50 TABLET, FILM COATED ORAL at 08:32

## 2024-10-31 RX ADMIN — METRONIDAZOLE 500 MG: 500 TABLET ORAL at 18:33

## 2024-10-31 RX ADMIN — ACETAMINOPHEN 650 MG: 650 SUSPENSION ORAL at 08:30

## 2024-10-31 RX ADMIN — ACETAMINOPHEN 650 MG: 650 SUSPENSION ORAL at 13:16

## 2024-10-31 RX ADMIN — POTASSIUM CHLORIDE 20 MEQ: 1.5 SOLUTION ORAL at 18:34

## 2024-10-31 RX ADMIN — ACETAMINOPHEN 650 MG: 650 SUSPENSION ORAL at 23:04

## 2024-10-31 RX ADMIN — DOCUSATE SODIUM LIQUID 50 MG: 50 LIQUID ORAL at 18:33

## 2024-10-31 RX ADMIN — OXYCODONE HYDROCHLORIDE 5 MG: 5 TABLET ORAL at 21:09

## 2024-10-31 RX ADMIN — POTASSIUM CHLORIDE 20 MEQ: 1.5 SOLUTION ORAL at 08:31

## 2024-10-31 RX ADMIN — ENOXAPARIN SODIUM 40 MG: 40 INJECTION SUBCUTANEOUS at 08:31

## 2024-10-31 RX ADMIN — PANTOPRAZOLE SODIUM 40 MG: 40 INJECTION, POWDER, FOR SOLUTION INTRAVENOUS at 08:41

## 2024-10-31 RX ADMIN — GABAPENTIN 300 MG: 250 SOLUTION ORAL at 08:40

## 2024-10-31 RX ADMIN — ANASTROZOLE 1 MG: 1 TABLET, COATED ORAL at 08:34

## 2024-10-31 RX ADMIN — ATORVASTATIN CALCIUM 40 MG: 40 TABLET, FILM COATED ORAL at 21:08

## 2024-10-31 NOTE — PLAN OF CARE
Problem: Nutrition/Hydration-ADULT  Goal: Nutrient/Hydration intake appropriate for improving, restoring or maintaining nutritional needs  Description: Monitor and assess patient's nutrition/hydration status for malnutrition. Collaborate with interdisciplinary team and initiate plan and interventions as ordered.  Monitor patient's weight and dietary intake as ordered or per policy. Utilize nutrition screening tool and intervene as necessary. Determine patient's food preferences and provide high-protein, high-caloric foods as appropriate.     INTERVENTIONS:  - Monitor oral intake, urinary output, labs, and treatment plans  - Assess nutrition and hydration status and recommend course of action  - Evaluate amount of meals eaten  - Assist patient with eating if necessary   - Allow adequate time for meals  - Recommend/ encourage appropriate diets, oral nutritional supplements, and vitamin/mineral supplements  - Order, calculate, and assess calorie counts as needed  - Recommend, monitor, and adjust tube feedings and TPN/PPN based on assessed needs  - Assess need for intravenous fluids  - Provide specific nutrition/hydration education as appropriate  - Include patient/family/caregiver in decisions related to nutrition  Outcome: Progressing     Problem: SAFETY ADULT  Goal: Maintain or return to baseline ADL function  Description: INTERVENTIONS:  -  Assess patient's ability to carry out ADLs; assess patient's baseline for ADL function and identify physical deficits which impact ability to perform ADLs (bathing, care of mouth/teeth, toileting, grooming, dressing, etc.)  - Assess/evaluate cause of self-care deficits   - Assess range of motion  - Assess patient's mobility; develop plan if impaired  - Assess patient's need for assistive devices and provide as appropriate  - Encourage maximum independence but intervene and supervise when necessary  - Involve family in performance of ADLs  - Assess for home care needs following  discharge   - Consider OT consult to assist with ADL evaluation and planning for discharge  - Provide patient education as appropriate  Outcome: Progressing

## 2024-10-31 NOTE — PROGRESS NOTES
Progress Notes - Family Medicine Residency, [place] Staterc Moura 1955, 69 y.o. female.  MRN: [id number]  Unit/Bed#: -01 Encounter: [id number]  Primary Care Provider: Fanta Turner MD      Admission Date: 10/29/2024 1204  Length of Stay: 1 days  Code Status:  Level 1 - Full Code  Disposition:   Consult:   IP CONSULT TO NUTRITION SERVICES  IP CONSULT TO HEMATOLOGY  IP CONSULT TO PALLIATIVE CARE  IP CONSULT TO ENT  IP CONSULT TO CASE MANAGEMENT         Assessment/Plan:      Plans discussed with [facility] team and finalization is pending attending physician attestation. Please, call [phone] for any clarification.     * Neoplastic malignant related fatigue  Assessment & Plan  -69-year-old female with an extensive oncologic history who was a direct admit from oncologic office due to persistent new fatigue.  Of note, patient recently was started on cisplatin last month for her right tonsillar SCC.  -Brief oncologic history:   -Late 2018 patient was found to have invasive cancer of the right breast; status post partial mastectomy, radiation --has since been on anastrozole  -July 2023: SCC of right tonsil + colorectal cancer with metastasis to the liver.  Briefly, patient had initially presented in 3/2023 per chart review to PCP with complaints of sore throat.  Had imaging and ultimately biopsy done that was concerning for carcinoma.  Eventually got a PET/CT which showed multiple hypermetabolic lesions in chest, abdomen and pelvis.   -Colorectal cancer with mets to liver:   -Had a right hemicolectomy in 3/2024 due to partial obstruction.  Had a cryoablation of liver mets in 6/2024. Chemo.    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  Subsequent tumor board culminated in recommendations for reevaluation by ENT and radiation oncology, as well as definitive chemoradiation treatment.  -In the last 6 weeks, patient has had cisplatin; seventh week has been held by heme-onc due to poor tolerance  -10/2 had gastrostomy tube placed for nutritional support by IR  -10/29 presented to outpatient heme-onc for follow-up.  Has significant fatigue and pain compared to previous visits.  Also complained of choking sensation worsening in the last few weeks.  Subsequently recommended to be transferred for direct admit to [facility] for further evaluation.    Assessment: Worsening fatigue, mild tachycardia, along with chemo-induced neutropenia differentials include infection, dehydration, intolerance to chemo    Plan:  -Infectious workup initiated:   -BCx x 2     - 1: no growth at 24h   -UA pending: not collected   -CXR negative; flu/COVID-negative   - Empirically placed on ceftriaxone/metronidazole starting 10/30/24; previously on zosyn for 2 doses  -Lactic acid, procalcitonin on admission WNL  -Neutropenia precautions  -Closely monitor I/O  -Monitor WBC, VS, other S/S of infection  -Continue with G tube feeds as noted by outpatient  -Continue with home gabapentin; per chart review has been having bilateral leg paresthesias    -Consult nutrition   - Home tube feeds resumed  -Consult heme-onc inpatient;   - Continue stabilization of patient  -Consult ENT given worsening \" choking\" symptoms and dysphagia   - No inpatient interventions  -Consult palliative care   - pain medications adjusted for increased somnolence    - CM for outpatient resources   - PT/OT while inpatient  -Continue with anastrozol    Sensation, choking  Assessment & Plan  -C/o progressively worsening choking sensation for past few weeks, mainly when patient lying down or asleep  -Has not been able to take anything by mouth except small sips of water  -Also has had significant pain around " "throat  -In the setting of right SCC with recent right tonsillectomy, and recent 6 cycles of cisplatin with 7 cycle held given her symptoms of progressively working worsening \"choking sensation\" and worsening fatigue  -see A&P under \"neoplastic malignant related fatigue\" for more details  -10/29/2024: CT neck soft tissue without contrast: Posttreatment changes as described without evidence for disease progression compared to PET/CT from 8/5/2024. No acute abnormality.  - Passed speech and swallow test; patient complains of pain and  - ENT: no interventions inpatient   Assessment: Dysphagia, dependent on G-tube for nutrition and medication administration    Plan:  -Home meds to be administered via G-tube  -Continue with home tube feeds  -Magic mouthwash as needed.  -Nutrition consulted, appreciate recommendations   - Resume home fees via G tube  -Aspiration precautions, airway precautions    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HCC)  Assessment & Plan  -Briefly, right tonsillar SCC first diagnosed in 7/2023  -Of note, interval PET/CT done in 8/2024 showed interval disease progression.  Consensus of follow-up tumor board was for patient to have reevaluation by ENT, radiation oncology, as well as definitive chemoradiation  -Last few weeks completed 6 cycles of cisplatin; 7 cycles stopped in light of recent fatigue/choking sensation worsening  -10/2/2024: G-tube placement  -10/9/2024: Right tonsillectomy  -10/29/2024: CT neck soft tissue without contrast: Posttreatment changes as described without evidence for disease progression compared to PET/CT from 8/5/2024. No acute abnormality.     Plan:  -See A&P under \"neoplastic malignant related fatigue\" and \"sensation choking\"  -Consults: Heme-onc, ENT, palliative care, nutrition     - ENT with no further intervention  -Aspiration and airway precautions  -All feeds and meds through G-tube    Malignant neoplasm of right female breast (HCC)  Assessment & Plan  -Briefly: " Diagnosed with invasive carcinoma of right breast, status post partial mastectomy and radiation  -Has since been on home anastrozole 1 mg daily    Plan:  -Continue with home anastrozole    Pancytopenia due to chemotherapy (HCC)  Assessment & Plan  Found to be pancytopenic on adm, with WBC 1.76, Plt 131, Hgb 7.6, in the setting of chemoradiation therapy.   Hgb 6.2 on 10 AM labs; 6.2 on repeat.   Transfused with irradiated pRBC 10/30, tolerated well.   Continue to monitor CBC.  Continue to monitor VS.    S/P percutaneous endoscopic gastrostomy (PEG) tube placement (HCC)  Assessment & Plan  -Placed on 10/2/2024 for nutritional support  -Per chart review:  -TF Goal: 2 containers (474 mL) 3 times daily of Jevity 1.5  -Water Flushin mL free water flush pre/post each bolus (360 mL water) + additional 150 mL water flushes 4 times daily (600 mL water).   -TF provides: 1422 mL total volume (6 cartons), 2133 kcal, 91g protein, 1081 mL free water, 2041 mL total fluid  -Prealbumin on admission low  - Passed speech and swallow test, patient complaint of dysphagia     Assessment: Dependent on PEG tube for nutrition and p.o. med administration due to dysphagia.    Plan:  -N.p.o., continue with home tube feeds and med administration via G-tube  -Continue with home vitamins  -Nutrition consulted: continue tube feeds   -Aspiration/airway precautions    Stage 3a chronic kidney disease (HCC)  Assessment & Plan  Lab Results   Component Value Date    EGFR 55 10/30/2024    EGFR 58 10/29/2024    EGFR 51 10/28/2024    CREATININE 1.04 10/30/2024    CREATININE 1.00 10/29/2024    CREATININE 1.10 10/28/2024     - Maintain adequate hydration  - Avoid nephrotoxic medications    Chemotherapy induced neutropenia (HCC)  Assessment & Plan  -WBC and ANC in the 1's    WCB & ANC     Results from last 7 days   Lab Units 10/31/24  0638 10/30/24  0434 10/29/24  1319 10/28/24  1112 10/25/24  1046   WBC Thousand/uL 1.65* 1.76* 1.53* 1.58* 1.68*  "  TOTAL NEUT ABS Thousands/µL  --  1.16* 1.07*  --   --        Plan:  -Neutropenic precautions  -Continue to monitor WBC, temperature, VS, S/S of infection    Metastatic colon cancer to liver (HCC)  Assessment & Plan  -Briefly: Diagnosed in 2023 with colorectal cancer with mets to the liver  -Had a right hemicolectomy in 3/2024 due to partial obstruction.  Had a cryoablation of liver mets in 2024. Chemo.  -Per recent oncology OV note, CRC is at least locally advanced versus metastatic and is unresectable.   -Of note, interval PET/CT done in 2024 showed interval disease progression.  Consensus of follow-up tumor board was for patient to have reevaluation by ENT, radiation oncology, as well as definitive chemoradiation     Plan:  -See A&P under \"neoplastic malignant related fatigue\" and \"sensation choking\"   -Consults: Heme-onc, palliative care, nutrition     GERD (gastroesophageal reflux disease)  Assessment & Plan  Home medication: pantoprazole 40 mg daily    Continue home medication    Mixed hyperlipidemia  Assessment & Plan  Home meds: Lipitor 40 mg  Continue home med     Primary hypertension  Assessment & Plan  Home meds: Losartan 50 mg  Systolic (24hrs), Av , Min:139 , Max:169   Diastolic (24hrs), Av, Min:67, Max:95    Plan:  -Continue with home meds          Diet: Diet Enteral/Parenteral; Tube Feeding No Oral Diet; Jevity 1.5; Bolus; 474; (3x/day) - 8:00AM,12:00PM,5:00PM; 60; Before and After each Bolus    VTE Pharm PPX: Enoxaparin (Lovenox)  VTE Mech PPX: sequential compression device      Subjective:   69 y.o. female admitted 10/29/2024 for fatigue, pain s/p cancer and recent tonsellectomy    Today 10/31/24, HD# 1    Per handoff, patient without acute events overnight.   Per nursing staff, no concern or report. Report of 1 x loose stools, no red flag symptoms  Continues with sore throat, difficulty swallowing  Patient seen and examined at bedside and without questions or concerns. Patient " denies CP, SOB, abdominal pain, N/V, headaches, dizziness, lightheadedness, changes in bowel movements, urinary symptoms at this time.    Medical management and treatment was discussed with patient, patient understands and agrees with plan.     Objective:     Vitals:    10/30/24 2018 10/30/24 2145 10/30/24 2210 10/31/24 0729   BP:  167/95  168/88   Pulse:    86   Resp: 18 18 18    Temp:  99 °F (37.2 °C) 99.1 °F (37.3 °C) 98 °F (36.7 °C)   TempSrc: Oral Oral Oral    SpO2:    97%     Temp:  [98 °F (36.7 °C)-100.4 °F (38 °C)] 98 °F (36.7 °C)  HR:  [] 86  BP: (139-169)/(67-95) 168/88  Resp:  [16-18] 18  SpO2:  [93 %-100 %] 97 %  O2 Device: None (Room air)  Weight (last 2 days)       None            Intake/Output Summary (Last 24 hours) at 10/31/2024 1044  Last data filed at 10/30/2024 2210  Gross per 24 hour   Intake 787.5 ml   Output --   Net 787.5 ml     Invasive Devices       Central Venous Catheter Line  Duration             Port A Cath 09/24/24 Right Chest 37 days              Drain  Duration             Gastrostomy/Enterostomy Percutaneous Interventional Gastrostomy 18 Fr. LUQ 28 days                      Physical Exam:     Physical Exam  Vitals and nursing note reviewed.   Constitutional:       Appearance: She is well-developed. She is ill-appearing.   HENT:      Head: Normocephalic and atraumatic.      Right Ear: External ear normal.      Left Ear: External ear normal.      Nose: Nose normal.      Mouth/Throat:      Mouth: Mucous membranes are moist.      Comments: Increased saliva, painful swallowing  Eyes:      Conjunctiva/sclera: Conjunctivae normal.   Cardiovascular:      Rate and Rhythm: Normal rate and regular rhythm.      Pulses: Normal pulses.      Heart sounds: Normal heart sounds. No murmur heard.  Pulmonary:      Effort: Pulmonary effort is normal. No respiratory distress.      Breath sounds: Normal breath sounds.   Abdominal:      General: Abdomen is flat. Bowel sounds are normal.       Palpations: Abdomen is soft.      Tenderness: There is no abdominal tenderness. There is no right CVA tenderness or left CVA tenderness.      Comments: PEG tube    Musculoskeletal:         General: No swelling.      Cervical back: Neck supple.      Right lower leg: No edema.      Left lower leg: No edema.   Skin:     General: Skin is warm and dry.      Capillary Refill: Capillary refill takes less than 2 seconds.   Neurological:      General: No focal deficit present.      Mental Status: She is alert and oriented to person, place, and time. Mental status is at baseline.   Psychiatric:         Mood and Affect: Mood normal.           Labs:     CBC:  Results from last 7 days   Lab Units 10/31/24  0638 10/31/24  0446 10/30/24  0749 10/30/24  0434 10/29/24  1954 10/29/24  1319 10/28/24  1112 10/25/24  1046   WBC Thousand/uL 1.65*  --   --  1.76*  --  1.53* 1.58* 1.68*   HEMOGLOBIN g/dL 8.7* 12.8 6.2* 6.2*  --  7.6* 7.0* 7.1*   HEMATOCRIT % 25.9* 38.2 19.2* 19.1*  --  23.1* 21.7* 21.9*   PLATELETS Thousands/uL 139*  --   --  131* 147* 133* 154 151   TOTAL NEUT ABS Thousands/µL  --   --   --  1.16*  --  1.07*  --   --        CMP:  Results from last 7 days   Lab Units 10/31/24  0638 10/30/24  0434 10/29/24  1319 10/28/24  1112 10/25/24  1046   POTASSIUM mmol/L 4.0 3.8 4.0 3.6 3.4*   CHLORIDE mmol/L 104 104 105 104 108   CO2 mmol/L 30 30 31 31 27   BUN mg/dL 25 26* 32* 31* 42*   CREATININE mg/dL 0.96 1.04 1.00 1.10 1.19   CALCIUM mg/dL 8.5 8.2* 8.9 8.6 8.5   AST U/L 32 23 21 28 19   ALT U/L 28 22 22 26 14   ALK PHOS U/L 104 103 105* 91 89   EGFR ml/min/1.73sq m 60 55 58 51 47   MAGNESIUM mg/dL 1.7* 1.4*  --  1.3* 1.3*   PHOSPHORUS mg/dL  --  3.3  --   --   --        Sepsis:  Results from last 7 days   Lab Units 10/30/24  0434 10/29/24  1954 10/29/24  1319   LACTIC ACID mmol/L  --  1.3  --    PROCALCITONIN ng/ml 0.17  --  0.14       Micro:  Lab Results   Component Value Date/Time    Blood Culture Received in Microbiology Lab.  Culture in Progress. 10/29/2024 11:28 PM    Blood Culture No Growth at 24 hrs. 10/29/2024 07:54 PM         Imaging:     XR chest pa and lateral    Result Date: 10/30/2024  Impression: No acute cardiopulmonary disease. Workstation performed: [id number]     CT soft tissue neck with contrast    Result Date: 10/29/2024  Impression: Posttreatment changes as described without evidence for disease progression compared to PET/CT from 8/5/2024. No acute abnormality. Workstation performed: [id number]         Medications:     Current Facility-Administered Medications:     acetaminophen (TYLENOL) oral suspension 650 mg, Q6H RAFFY    anastrozole (ARIMIDEX) tablet 1 mg, Daily    atorvastatin (LIPITOR) tablet 40 mg, HS    cefTRIAXone (ROCEPHIN) 2,000 mg in dextrose 5 % 50 mL IVPB, Q24H, Last Rate: 2,000 mg (10/30/24 1611)    cyanocobalamin (VITAMIN B-12) tablet 1,000 mcg, Daily    diphenhydramine, lidocaine, Al/Mg hydroxide, simethicone (Magic Mouthwash) oral solution 10 mL, Q4H PRN    docusate (COLACE) oral liquid 50 mg, BID    enoxaparin (LOVENOX) subcutaneous injection 40 mg, Daily    ergocalciferol (VITAMIN D2) capsule 50,000 Units, Weekly    folic acid (FOLVITE) tablet 1 mg, Daily    gabapentin (NEURONTIN) oral solution 300 mg, Daily    gabapentin (NEURONTIN) oral solution 600 mg, HS    losartan (COZAAR) tablet 50 mg, Daily    magnesium sulfate 2 g/50 mL IVPB (premix) 2 g, Once    metroNIDAZOLE (FLAGYL) tablet 500 mg, Q12H    mirtazapine (REMERON) tablet 45 mg, HS    ondansetron (ZOFRAN) oral solution 4 mg, Q4H PRN    oxyCODONE (ROXICODONE) IR tablet 5 mg, Q4H PRN    pantoprazole (PROTONIX) injection 40 mg, Q24H RAFFY    polyethylene glycol (MIRALAX) packet 17 g, Daily    potassium chloride oral solution 20 mEq, BID    pyridoxine (VITAMIN B6) tablet 50 mg, Daily      Conor Herr MD  Family Medicine, PGY-3  10:44 AM 10/31/2024

## 2024-10-31 NOTE — UTILIZATION REVIEW
NOTIFICATION OF INPATIENT ADMISSION      AUTHORIZATION REQUEST   SERVICING FACILITY:   [facility]  Address: [address] [place] [address]  Tax ID: [id number]  NPI: [id number] ATTENDING PROVIDER:  Attending Name and NPI#: Neena Marques Md [[id number]]  Address: [address] [place] [address]  Phone: [phone]   ADMISSION INFORMATION:  Place of Service: Inpatient [facility]  Place of Service Code: 21  Inpatient Admission Date/Time: 10/30/24  3:33 PM  Discharge Date/Time: No discharge date for patient encounter.  Admitting Diagnosis Code/Description:  Cancer associated pain [G89.3]  Acute pain [R52]  Sensation, choking [R09.89]  On tube feeding diet [Z78.9]  S/P percutaneous endoscopic gastrostomy (PEG) tube placement (HCC) [Z93.1]  Fatigue, unspecified type [R53.83]     UTILIZATION REVIEW CONTACT:  Jolly Chavez, Utilization   Network Utilization Review Department  Phone: [phone]  Fax: [phone]  Email: [email or web address]  Contact for approvals/pending authorizations, clinical reviews, and discharge.     PHYSICIAN ADVISORY SERVICES:  Medical Necessity Denial & Ynfu-vh-Tdsu Review  Phone: [phone]  Fax: [phone]  Email: [email or web address]     DISCHARGE SUPPORT TEAM:  For Patients Discharge Needs & Updates  Phone: [phone] opt. 2 Fax: [phone]  Email: [email or web address]

## 2024-10-31 NOTE — QUICK NOTE
"Went to patient bedside for evening rounds.  Patient was in the bathroom at that time.  Spoke with patient's daughter.  No questions/concerns at this time.    Latest VS as listed below:  /77   Pulse 88   Temp 98.2 °F (36.8 °C) (Oral)   Resp 18   SpO2 98%     Overall, this is a 69-year-old female who was a direct admit from her oncology office due to worsening fatigue and worsening \"choking sensation.\"  She is currently on CTX/Flagyl.  Patient also received 2 units of irradiated pRBC today due to Hgb 6.2, same on recheck.  Overall, tolerated well.  Will follow her repeat H&H.  Will continue to monitor her overnight.    Porsha Figueroa, DO  PGY-2  [facility] [place]     "

## 2024-10-31 NOTE — ASSESSMENT & PLAN NOTE
Summary of Recommendations:  Primary Medical Oncologist discussed case with Radiation Oncology - Hold further radiation at this time.  Resumption of outpatient radiation next week, is dependent on clinical course.  Continue pain-regimen, as follows:  Started Acetaminophen 650 mg Q6HR Scheduled.  Continue Oxycodone 5 mg Per-Tube Q4HR PRN.  Continue Gabapentin 300 mg Per-Tube Daily, and 600 mg Per-Tube Bedtime (Scheduled).  Continue anti-emetic regimen, as follows: Ondansetron 4 mg Per-Tube Q4HR PRN (Nausea).  Continue bowel-regimen to prevent opiate-induced constipation: Miralax 17 Grams Per Tube Daily.  Recommend Physical and Occupational Therapy consultations, for further evaluation.  Placed consultation for Case Management, to assist with coordination of Home Health Services.

## 2024-10-31 NOTE — CASE MANAGEMENT
Case Management Assessment & Discharge Planning Note    Patient name Maira Moura  Location /-01 MRN [id number]  : 1955 Date 10/31/2024       Current Admission Date: 10/29/2024  Current Admission Diagnosis:Neoplastic malignant related fatigue   Patient Active Problem List    Diagnosis Date Noted Date Diagnosed    Pancytopenia due to chemotherapy (HCC) 10/30/2024     Neoplastic malignant related fatigue 10/29/2024     Sensation, choking 10/29/2024     S/P percutaneous endoscopic gastrostomy (PEG) tube placement (HCC) 10/29/2024     Vitamin B12 deficiency 2024     Elevated LFTs 2024     Chronic nasal congestion 2024     Hypokalemia 03/15/2024     Leukemoid reaction 03/15/2024     GOGO (acute kidney injury) (HCC) 03/15/2024     Acute post-operative pain 03/15/2024     At risk for constipation 03/15/2024     At risk for delirium 03/15/2024     Advance care planning 03/15/2024     Physical deconditioning 03/15/2024     History of CVA (cerebrovascular accident) 03/15/2024     Diastolic dysfunction 2024     Neuropathy 2024     Thrombocytopenia (HCC) 2023     Stage 3a chronic kidney disease (HCC) 2023     Chemotherapy induced neutropenia (HCC) 2023     Dehydration 10/06/2023     Drug-induced constipation 10/06/2023     Nutritional anemia 2023     Poor appetite 2023     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HCC) 2023     Metastatic colon cancer to liver (HCC) 2023     GERD (gastroesophageal reflux disease)      Obesity      Right thyroid nodule 2023     Prediabetes 2022     Vitamin D deficiency 2019     Malignant neoplasm of right female breast (HCC) 2018     Mixed hyperlipidemia 2018     Primary hypertension 2017       LOS (days): 1  Geometric Mean LOS (GMLOS) (days): 2.8  Days to GMLOS:1.7     OBJECTIVE:    Risk of Unplanned Readmission Score: 27.93         Current admission status:  Inpatient       Preferred Pharmacy:    [facility] [place] - [address] [facility]  [address] [address]  Phone: [phone] Fax: [phone]    [facility] - [place] - [address]  [address] [address]  Phone: [phone] Fax: [phone]    [facility] [place] - [place] - [address],  [address]  [address] [facility] [address]  Phone: [phone] Fax: [phone]    Primary Care Provider: Fanta Turner MD    Primary Insurance: MEDICARE  Secondary Insurance: [facility]    ASSESSMENT:  Active Health Care Proxies       [facility] Representative - Son   Primary Phone: [phone] (Mobile)                   Readmission Root Cause  30 Day Readmission: No    Patient Information  Admitted from:: Home  Mental Status: Alert  During Assessment patient was accompanied by: Daughter  Assessment information provided by:: Patient, Daughter  Primary Caregiver: Self  Support Systems: Self, Family members  County of Residence: [place]  What city do you live in?: [place]  Home entry access options. Select all that apply.: Elevator  Type of Current Residence: Apartment  Floor Level: 3  Upon entering residence, is there a bedroom on the main floor (no further steps)?: Yes  Upon entering residence, is there a bathroom on the main floor (no further steps)?: Yes  Living Arrangements: Lives w/ Children    Activities of Daily Living Prior to Admission  Functional Status: Independent  Completes ADLs independently?: Yes  Ambulates independently?: Yes  Does patient currently own DME?: No  Does patient have a history of Outpatient Therapy (PT/OT)?: No  Does the patient have a history of Short-Term Rehab?: Yes ([facility] in March)  Does patient have a history of HHC?: No  Does patient currently have HHC?: No         Patient Information Continued  Income Source:  Pension/detention  Does patient have prescription coverage?: Yes  Does patient receive dialysis treatments?: No  Does patient have a history of substance abuse?: No  Does patient have a history of Mental Health Diagnosis?: No         Means of Transportation  Means of Transport to Appts:: Public Transportation - Bus      Social Determinants of Health (SDOH)      Flowsheet Row Most Recent Value   Housing Stability    In the last 12 months, was there a time when you were not able to pay the mortgage or rent on time? N   In the past 12 months, how many times have you moved where you were living? 0   At any time in the past 12 months, were you homeless or living in a shelter (including now)? N   Transportation Needs    In the past 12 months, has lack of transportation kept you from medical appointments or from getting medications? no   In the past 12 months, has lack of transportation kept you from meetings, work, or from getting things needed for daily living? No   Food Insecurity    Within the past 12 months, you worried that your food would run out before you got the money to buy more. Never true   Within the past 12 months, the food you bought just didn't last and you didn't have money to get more. Never true   Utilities    In the past 12 months has the electric, gas, oil, or water company threatened to shut off services in your home? No            DISCHARGE DETAILS:    Discharge planning discussed with:: Patient and daughter at bedside.  Freedom of Choice: Yes  Comments - Freedom of Choice: FOC briefly discussed.  Pending PT/OT recommendations.  Pt said she is open to discussing STR and HHC if recommended.  CM following  CM contacted family/caregiver?: Yes     Contacts  Patient Contacts: Marleny Moura-daughter  Relationship to Patient:: Family  Contact Method: In Person  Reason/Outcome: Continuity of Care, Emergency Contact, Referral, Discharge Planning       Other Referral/Resources/Interventions  Provided:  Referral Comments: Pt admitted [facility] neoplastic malignant related fatigue and amenia, s/p 2 units of irradiated pRBC 10/29/24.  PT/OT pending. CM following for discharge planning     CM met with pt and her daughter at bedside.  Introduced myself and explained my role. Pt lives in apartment on the 3rd floor, elevator is available.  IADL.  STR at [facility] in March 24.  Pt lives with son and Dtr.  CM following for DC needs. PT/OT pending.

## 2024-10-31 NOTE — PROGRESS NOTES
Progress Note - Palliative Care   Name: Maira Moura 69 y.o. female I MRN: [id number]  Unit/Bed#: -01 I Date of Admission: 10/29/2024   Date of Service: 10/31/2024 I Hospital Day: 1     Assessment & Plan  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HCC)  Summary of Recommendations:  Primary Medical Oncologist discussed case with Radiation Oncology - Hold further radiation at this time.  Resumption of outpatient radiation next week, is dependent on clinical course.  Continue pain-regimen, as follows:  Started Acetaminophen 650 mg Q6HR Scheduled.  Continue Oxycodone 5 mg Per-Tube Q4HR PRN.  Continue Gabapentin 300 mg Per-Tube Daily, and 600 mg Per-Tube Bedtime (Scheduled).  Continue anti-emetic regimen, as follows: Ondansetron 4 mg Per-Tube Q4HR PRN (Nausea).  Continue bowel-regimen to prevent opiate-induced constipation: Miralax 17 Grams Per Tube Daily.  Recommend Physical and Occupational Therapy consultations, for further evaluation.  Placed consultation for Case Management, to assist with coordination of Home Health Services.    Subjective:  Interval History: Patient was seen on the General Medical Floor. Resting comfortably on my arrival, with daughter present at bedside. No acute events noted, overnight. On my evaluation this morning, patient was somnolent; but readily awakened to voice. She endorsed adequate pain-control. Of note, patient only received Oxycodone 5 mg PO Once, overnight. Patient appeared uncomfortable with speech, and requested return to sleep. Patient's daughter had no further concerns or complaints at the time of our encounter. Addressed all questions and concerns.     Review of Systems:  All systems reviewed and negative except otherwise listed in the History of Present Illness.    Objective:  Vitals:    10/31/24 0729   BP: 168/88   Pulse: 86   Resp:    Temp: 98 °F (36.7 °C)   SpO2: 97%     Physical Exam:  General: Somnolent on Initial Exam and Upon Multiple  Reassessments  Respiratory: Normal work of breathing; No supplemental oxygen requirement    WBC   Date Value Ref Range Status   10/31/2024 1.65 (L) 4.31 - 10.16 Thousand/uL Final   10/30/2024 1.76 (L) 4.31 - 10.16 Thousand/uL Final   10/29/2024 1.53 (L) 4.31 - 10.16 Thousand/uL Final     Hemoglobin   Date Value Ref Range Status   10/31/2024 8.7 (L) 11.5 - 15.4 g/dL Final   10/31/2024 12.8 11.5 - 15.4 g/dL Final     Comment:     Post Transfusion   10/30/2024 6.2 (L) 11.5 - 15.4 g/dL Final     Platelets   Date Value Ref Range Status   10/31/2024 139 (L) 149 - 390 Thousands/uL Final   10/30/2024 131 (L) 149 - 390 Thousands/uL Final   10/29/2024 147 (L) 149 - 390 Thousands/uL Final     MCV   Date Value Ref Range Status   10/31/2024 87 82 - 98 fL Final   10/30/2024 88 82 - 98 fL Final   10/29/2024 88 82 - 98 fL Final      Potassium   Date Value Ref Range Status   10/31/2024 4.0 3.5 - 5.3 mmol/L Final   10/30/2024 3.8 3.5 - 5.3 mmol/L Final   10/29/2024 4.0 3.5 - 5.3 mmol/L Final   02/20/2024 4.0 3.5 - 5.2 mmol/L Final   01/04/2022 3.7 3.5 - 5.2 mmol/L Final   01/03/2022 3.4 (L) 3.5 - 5.2 mmol/L Final     Chloride   Date Value Ref Range Status   10/31/2024 104 96 - 108 mmol/L Final   10/30/2024 104 96 - 108 mmol/L Final   10/29/2024 105 96 - 108 mmol/L Final   02/20/2024 104 100 - 109 mmol/L Final   01/04/2022 110 (H) 100 - 109 mmol/L Final   01/03/2022 107 100 - 109 mmol/L Final     Carbon Dioxide   Date Value Ref Range Status   02/20/2024 23 21 - 31 mmol/L Final   01/04/2022 25 23 - 31 mmol/L Final   01/03/2022 27 23 - 31 mmol/L Final     CO2   Date Value Ref Range Status   10/31/2024 30 21 - 32 mmol/L Final   10/30/2024 30 21 - 32 mmol/L Final   10/29/2024 31 21 - 32 mmol/L Final     CO2, i-STAT   Date Value Ref Range Status   03/12/2024 26 21 - 32 mmol/L Final   03/12/2024 24 21 - 32 mmol/L Final     BUN   Date Value Ref Range Status   10/31/2024 25 5 - 25 mg/dL Final   10/30/2024 26 (H) 5 - 25 mg/dL Final    10/29/2024 32 (H) 5 - 25 mg/dL Final   02/20/2024 12 7 - 25 mg/dL Final   01/04/2022 12 7 - 25 mg/dL Final   01/03/2022 18 7 - 25 mg/dL Final     Creatinine   Date Value Ref Range Status   10/31/2024 0.96 0.60 - 1.30 mg/dL Final     Comment:     Standardized to IDMS reference method   10/30/2024 1.04 0.60 - 1.30 mg/dL Final     Comment:     Standardized to IDMS reference method   10/29/2024 1.00 0.60 - 1.30 mg/dL Final     Comment:     Standardized to IDMS reference method   02/20/2024 0.97 0.40 - 1.10 mg/dL Final   01/04/2022 0.68 0.40 - 1.10 mg/dL Final   01/03/2022 1.01 0.40 - 1.10 mg/dL Final     Glucose   Date Value Ref Range Status   10/31/2024 114 65 - 140 mg/dL Final     Comment:     If the patient is fasting, the ADA then defines impaired fasting glucose as > 100 mg/dL and diabetes as > or equal to 123 mg/dL.   10/30/2024 102 65 - 140 mg/dL Final     Comment:     If the patient is fasting, the ADA then defines impaired fasting glucose as > 100 mg/dL and diabetes as > or equal to 123 mg/dL.   10/29/2024 102 65 - 140 mg/dL Final     Comment:     If the patient is fasting, the ADA then defines impaired fasting glucose as > 100 mg/dL and diabetes as > or equal to 123 mg/dL.   02/20/2024 114 (H) 65 - 99 mg/dL Final   01/04/2022 98 65 - 99 mg/dL Final   01/03/2022 98 65 - 99 mg/dL Final     eGFR, Non-   Date Value Ref Range Status   01/04/2022 GFR not calculated due to lack of steady state. >60 Final   01/03/2022 59 (L) >60 Final     eGFR,    Date Value Ref Range Status   01/04/2022 GFR not calculated due to lack of steady state. >60 Final   01/03/2022 68 >60 Final     Calcium   Date Value Ref Range Status   10/31/2024 8.5 8.4 - 10.2 mg/dL Final   10/30/2024 8.2 (L) 8.4 - 10.2 mg/dL Final   10/29/2024 8.9 8.4 - 10.2 mg/dL Final   02/20/2024 9.6 8.5 - 10.1 mg/dL Final   01/04/2022 8.8 8.5 - 10.1 mg/dL Final   01/03/2022 8.8 8.5 - 10.1 mg/dL Final     Albumin   Date Value Ref Range  Status   10/31/2024 2.9 (L) 3.5 - 5.0 g/dL Final   10/30/2024 2.8 (L) 3.5 - 5.0 g/dL Final   10/29/2024 3.2 (L) 3.5 - 5.0 g/dL Final     ALBUMIN   Date Value Ref Range Status   02/20/2024 3.5 3.5 - 5.7 g/dL Final   01/04/2022 2.8 (L) 3.5 - 4.8 g/dL Final   01/03/2022 3.1 (L) 3.5 - 4.8 g/dL Final     Total Bilirubin   Date Value Ref Range Status   10/31/2024 0.57 0.20 - 1.00 mg/dL Final     Comment:     Use of this assay is not recommended for patients undergoing treatment with eltrombopag due to the potential for falsely elevated results.  N-acetyl-p-benzoquinone imine (metabolite of Acetaminophen) will generate erroneously low results in samples for patients that have taken an overdose of Acetaminophen.   10/30/2024 0.34 0.20 - 1.00 mg/dL Final     Comment:     Use of this assay is not recommended for patients undergoing treatment with eltrombopag due to the potential for falsely elevated results.  N-acetyl-p-benzoquinone imine (metabolite of Acetaminophen) will generate erroneously low results in samples for patients that have taken an overdose of Acetaminophen.   10/29/2024 0.33 0.20 - 1.00 mg/dL Final     Comment:     Use of this assay is not recommended for patients undergoing treatment with eltrombopag due to the potential for falsely elevated results.  N-acetyl-p-benzoquinone imine (metabolite of Acetaminophen) will generate erroneously low results in samples for patients that have taken an overdose of Acetaminophen.   02/20/2024 0.4 0.2 - 1.0 mg/dL Final     Comment:     Eltrombopag and its metabolites may interfere with this assay causing erroneously high patient results.   01/04/2022 0.5 0.2 - 1.0 mg/dL Final     Comment:     Use of this assay is not recommended for patients undergoing treatment with eltrombopag due to the potential for falsely elevated results.   01/03/2022 0.5 0.2 - 1.0 mg/dL Final     Comment:     Use of this assay is not recommended for patients undergoing treatment with eltrombopag  "due to the potential for falsely elevated results.     Alkaline Phosphatase   Date Value Ref Range Status   10/31/2024 104 34 - 104 U/L Final   10/30/2024 103 34 - 104 U/L Final   10/29/2024 105 (H) 34 - 104 U/L Final   02/20/2024 96 35 - 120 U/L Final   01/04/2022 119 35 - 120 U/L Final   01/03/2022 132 (H) 35 - 120 U/L Final     AST   Date Value Ref Range Status   10/31/2024 32 13 - 39 U/L Final   10/30/2024 23 13 - 39 U/L Final   10/29/2024 21 13 - 39 U/L Final   02/20/2024 83 (H) <41 U/L Final   01/04/2022 47 (H) <41 U/L Final   01/03/2022 40 <41 U/L Final     ALT   Date Value Ref Range Status   10/31/2024 28 7 - 52 U/L Final     Comment:     Specimen collection should occur prior to Sulfasalazine administration due to the potential for falsely depressed results.    10/30/2024 22 7 - 52 U/L Final     Comment:     Specimen collection should occur prior to Sulfasalazine administration due to the potential for falsely depressed results.    10/29/2024 22 7 - 52 U/L Final     Comment:     Specimen collection should occur prior to Sulfasalazine administration due to the potential for falsely depressed results.    02/20/2024 50 <56 U/L Final   01/04/2022 47 <56 U/L Final   01/03/2022 41 <56 U/L Final    No results found for: \"LDH\"   TSH   Date Value Ref Range Status   01/03/2022 1.58 0.36 - 3.74 uIU/mL Final    No results found for: \"D5IOPTI\" No results found for: \"FREET4\"     Patient was seen and discussed with attending physician, Fletcher Lares M.D.    Carole Newman D.O.  Hematology-Oncology Fellow (PGY-5)   "

## 2024-11-01 ENCOUNTER — APPOINTMENT (OUTPATIENT)
Dept: RADIATION ONCOLOGY | Facility: CLINIC | Age: 69
End: 2024-11-01
Attending: RADIOLOGY
Payer: MEDICARE

## 2024-11-01 ENCOUNTER — APPOINTMENT (OUTPATIENT)
Dept: RADIATION ONCOLOGY | Facility: CLINIC | Age: 69
End: 2024-11-01
Payer: MEDICARE

## 2024-11-01 ENCOUNTER — HOSPITAL ENCOUNTER (OUTPATIENT)
Dept: INFUSION CENTER | Facility: CLINIC | Age: 69
Discharge: HOME | End: 2024-11-01

## 2024-11-01 LAB
ANION GAP SERPL CALCULATED.3IONS-SCNC: 6 MMOL/L (ref 4–13)
ANISOCYTOSIS BLD QL SMEAR: PRESENT
BUN SERPL-MCNC: 29 MG/DL (ref 5–25)
CALCIUM SERPL-MCNC: 8.3 MG/DL (ref 8.4–10.2)
CHLORIDE SERPL-SCNC: 108 MMOL/L (ref 96–108)
CO2 SERPL-SCNC: 29 MMOL/L (ref 21–32)
CREAT SERPL-MCNC: 0.97 MG/DL (ref 0.6–1.3)
EOSINOPHIL # BLD AUTO: 0.06 THOUSAND/UL (ref 0–0.61)
EOSINOPHIL NFR BLD MANUAL: 3 % (ref 0–6)
ERYTHROCYTE [DISTWIDTH] IN BLOOD BY AUTOMATED COUNT: 16.6 % (ref 11.6–15.1)
FERRITIN SERPL-MCNC: 533 NG/ML (ref 11–307)
GFR SERPL CREATININE-BSD FRML MDRD: 60 ML/MIN/1.73SQ M
GLUCOSE SERPL-MCNC: 127 MG/DL (ref 65–140)
HCT VFR BLD AUTO: 28.3 % (ref 34.8–46.1)
HGB BLD-MCNC: 9.2 G/DL (ref 11.5–15.4)
LDH SERPL-CCNC: 202 U/L (ref 140–271)
LYMPHOCYTES # BLD AUTO: 0.35 THOUSAND/UL (ref 0.6–4.47)
LYMPHOCYTES # BLD AUTO: 19 %
MAGNESIUM SERPL-MCNC: 1.5 MG/DL (ref 1.9–2.7)
MCH RBC QN AUTO: 28.6 PG (ref 26.8–34.3)
MCHC RBC AUTO-ENTMCNC: 32.5 G/DL (ref 31.4–37.4)
MCV RBC AUTO: 88 FL (ref 82–98)
MONOCYTES # BLD AUTO: 0.06 THOUSAND/UL (ref 0–1.22)
MONOCYTES NFR BLD AUTO: 3 % (ref 4–12)
NEUTS SEG # BLD: 1.4 THOUSAND/UL (ref 1.81–6.82)
NEUTS SEG NFR BLD AUTO: 75 %
NRBC BLD AUTO-RTO: 0 /100 WBCS
PHOSPHATE SERPL-MCNC: 2.8 MG/DL (ref 2.3–4.1)
PLATELET # BLD AUTO: 163 THOUSANDS/UL (ref 149–390)
PLATELET BLD QL SMEAR: ADEQUATE
PMV BLD AUTO: 9.5 FL (ref 8.9–12.7)
POIKILOCYTOSIS BLD QL SMEAR: PRESENT
POLYCHROMASIA BLD QL SMEAR: PRESENT
POTASSIUM SERPL-SCNC: 3.8 MMOL/L (ref 3.5–5.3)
RBC # BLD AUTO: 3.22 MILLION/UL (ref 3.81–5.12)
RBC MORPH BLD: PRESENT
RETICS # AUTO: NORMAL 10*3/UL (ref 14097–95744)
RETICS # CALC: 0.93 % (ref 0.37–1.87)
SODIUM SERPL-SCNC: 143 MMOL/L (ref 135–147)
TOTAL CELLS COUNTED SPEC: 100
WBC # BLD AUTO: 1.86 THOUSAND/UL (ref 4.31–10.16)

## 2024-11-01 PROCEDURE — 82728 ASSAY OF FERRITIN: CPT

## 2024-11-01 PROCEDURE — 97163 PT EVAL HIGH COMPLEX 45 MIN: CPT

## 2024-11-01 PROCEDURE — 85045 AUTOMATED RETICULOCYTE COUNT: CPT

## 2024-11-01 PROCEDURE — 97167 OT EVAL HIGH COMPLEX 60 MIN: CPT

## 2024-11-01 PROCEDURE — 85007 BL SMEAR W/DIFF WBC COUNT: CPT

## 2024-11-01 PROCEDURE — 83540 ASSAY OF IRON: CPT

## 2024-11-01 PROCEDURE — 84100 ASSAY OF PHOSPHORUS: CPT

## 2024-11-01 PROCEDURE — 80048 BASIC METABOLIC PNL TOTAL CA: CPT

## 2024-11-01 PROCEDURE — 83550 IRON BINDING TEST: CPT

## 2024-11-01 PROCEDURE — 83735 ASSAY OF MAGNESIUM: CPT

## 2024-11-01 PROCEDURE — 83010 ASSAY OF HAPTOGLOBIN QUANT: CPT

## 2024-11-01 PROCEDURE — 99232 SBSQ HOSP IP/OBS MODERATE 35: CPT

## 2024-11-01 RX ORDER — LABETALOL HYDROCHLORIDE 5 MG/ML
10 INJECTION, SOLUTION INTRAVENOUS EVERY 6 HOURS PRN
Status: DISCONTINUED | OUTPATIENT
Start: 2024-11-01 | End: 2024-11-04 | Stop reason: HOSPADM

## 2024-11-01 RX ORDER — MAGNESIUM SULFATE HEPTAHYDRATE 40 MG/ML
2 INJECTION, SOLUTION INTRAVENOUS ONCE
Status: COMPLETED | OUTPATIENT
Start: 2024-11-01 | End: 2024-11-01

## 2024-11-01 RX ORDER — AMLODIPINE BESYLATE 5 MG/1
5 TABLET ORAL DAILY
Status: DISCONTINUED | OUTPATIENT
Start: 2024-11-02 | End: 2024-11-04 | Stop reason: HOSPADM

## 2024-11-01 RX ORDER — AMLODIPINE BESYLATE 5 MG/1
5 TABLET ORAL DAILY
Status: DISCONTINUED | OUTPATIENT
Start: 2024-11-01 | End: 2024-11-01

## 2024-11-01 RX ADMIN — PANTOPRAZOLE SODIUM 40 MG: 40 INJECTION, POWDER, FOR SOLUTION INTRAVENOUS at 09:12

## 2024-11-01 RX ADMIN — AMLODIPINE BESYLATE 5 MG: 5 TABLET ORAL at 11:40

## 2024-11-01 RX ADMIN — POTASSIUM CHLORIDE 20 MEQ: 1.5 SOLUTION ORAL at 17:06

## 2024-11-01 RX ADMIN — POTASSIUM CHLORIDE 20 MEQ: 1.5 SOLUTION ORAL at 09:12

## 2024-11-01 RX ADMIN — MAGNESIUM SULFATE HEPTAHYDRATE 2 G: 40 INJECTION, SOLUTION INTRAVENOUS at 16:35

## 2024-11-01 RX ADMIN — DOCUSATE SODIUM LIQUID 50 MG: 50 LIQUID ORAL at 09:12

## 2024-11-01 RX ADMIN — MIRTAZAPINE 45 MG: 30 TABLET, FILM COATED ORAL at 22:20

## 2024-11-01 RX ADMIN — ENOXAPARIN SODIUM 40 MG: 40 INJECTION SUBCUTANEOUS at 09:12

## 2024-11-01 RX ADMIN — FOLIC ACID 1 MG: 1 TABLET ORAL at 09:13

## 2024-11-01 RX ADMIN — GABAPENTIN 300 MG: 250 SOLUTION ORAL at 09:16

## 2024-11-01 RX ADMIN — GABAPENTIN 600 MG: 250 SOLUTION ORAL at 22:24

## 2024-11-01 RX ADMIN — METRONIDAZOLE 500 MG: 500 TABLET ORAL at 06:23

## 2024-11-01 RX ADMIN — ACETAMINOPHEN 650 MG: 650 SUSPENSION ORAL at 11:40

## 2024-11-01 RX ADMIN — CYANOCOBALAMIN TAB 500 MCG 1000 MCG: 500 TAB at 09:13

## 2024-11-01 RX ADMIN — ATORVASTATIN CALCIUM 40 MG: 40 TABLET, FILM COATED ORAL at 22:19

## 2024-11-01 RX ADMIN — ACETAMINOPHEN 650 MG: 650 SUSPENSION ORAL at 23:03

## 2024-11-01 RX ADMIN — DOCUSATE SODIUM LIQUID 50 MG: 50 LIQUID ORAL at 17:06

## 2024-11-01 RX ADMIN — ANASTROZOLE 1 MG: 1 TABLET, COATED ORAL at 09:13

## 2024-11-01 RX ADMIN — ACETAMINOPHEN 650 MG: 650 SUSPENSION ORAL at 17:06

## 2024-11-01 RX ADMIN — CEFTRIAXONE SODIUM 2000 MG: 10 INJECTION, POWDER, FOR SOLUTION INTRAVENOUS at 15:43

## 2024-11-01 RX ADMIN — LOSARTAN POTASSIUM 50 MG: 50 TABLET, FILM COATED ORAL at 09:13

## 2024-11-01 RX ADMIN — ACETAMINOPHEN 650 MG: 650 SUSPENSION ORAL at 06:23

## 2024-11-01 RX ADMIN — METRONIDAZOLE 500 MG: 500 TABLET ORAL at 17:06

## 2024-11-01 RX ADMIN — PYRIDOXINE HCL TAB 50 MG 50 MG: 50 TAB at 09:13

## 2024-11-01 NOTE — QUICK NOTE
Patient was seen at bedside during evening rounds.  Daughter was at bedside.  Patient did not have any new acute complaints.  Daughter endorses that her BMs have been loose but have not worsened or changed since prior to admission.  Patient wishes asking for another dose of her as needed Tylenol and ice chips.  Given that patient had passed speech and swallow and that it was mainly due to her throat pain that made it difficult for her to swallow, okay for patient to be given ice chips tonight.  Did  that patient should try to do so while sitting up to avoid aspiration.  Past these messages want to nurse.  Will continue to monitor overnight.      Porsha Figueroa, DO  PGY-2  [facility]

## 2024-11-01 NOTE — PROGRESS NOTES
Progress Notes - [facility], [place] 1955, 69 y.o. female.  MRN: [id number]  Unit/Bed#: -01 Encounter: [id number]  Primary Care Provider: Fanta Turner MD      Admission Date: 10/29/2024 1204  Length of Stay: 2 days  Code Status:  Level 1 - Full Code  Disposition:   Consult:   IP CONSULT TO NUTRITION SERVICES  IP CONSULT TO HEMATOLOGY  IP CONSULT TO PALLIATIVE CARE  IP CONSULT TO ENT  IP CONSULT TO CASE MANAGEMENT         Assessment/Plan:      Plans discussed with [facility] team and finalization is pending attending physician attestation. Please, call [phone] for any clarification.     * Neoplastic malignant related fatigue  Assessment & Plan  -69-year-old female with an extensive oncologic history who was a direct admit from oncologic office due to persistent new fatigue.  Of note, patient recently was started on cisplatin last month for her right tonsillar SCC.  -Brief oncologic history:   -Late 2018 patient was found to have invasive cancer of the right breast; status post partial mastectomy, radiation --has since been on anastrozole  -July 2023: SCC of right tonsil + colorectal cancer with metastasis to the liver.  Briefly, patient had initially presented in 3/2023 per chart review to PCP with complaints of sore throat.  Had imaging and ultimately biopsy done that was concerning for carcinoma.  Eventually got a PET/CT which showed multiple hypermetabolic lesions in chest, abdomen and pelvis.   -Colorectal cancer with mets to liver:   -Had a right hemicolectomy in 3/2024 due to partial obstruction.  Had a cryoablation of liver mets in 6/2024. Chemo.    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  Subsequent tumor board culminated in recommendations for reevaluation by ENT and radiation oncology, as well as definitive chemoradiation treatment.  -In the last 6 weeks, patient has had cisplatin; seventh week has been held by heme-onc due to poor tolerance  -10/2 had gastrostomy tube placed for nutritional support by IR  -10/29 presented to outpatient heme-onc for follow-up.  Has significant fatigue and pain compared to previous visits.  Also complained of choking sensation worsening in the last few weeks.  Subsequently recommended to be transferred for direct admit to [facility] for further evaluation.    Assessment: Worsening fatigue, mild tachycardia, along with chemo-induced neutropenia differentials include infection, dehydration, intolerance to chemo    Plan:  -Infectious workup initiated:   -BCx x 2     - No growth at 48h   -UA pending: not collected   -CXR negative; flu/COVID-negative   - Empirically placed on ceftriaxone/metronidazole starting 10/30/24; previously on zosyn for 2 doses    - will confirm with H/O for d/c on 11/1  -Lactic acid, procalcitonin on admission WNL  -Neutropenia precautions  -Closely monitor I/O  -Monitor WBC, VS, other S/S of infection  -Continue with G tube feeds as noted by outpatient  -Continue with home gabapentin; per chart review has been having bilateral leg paresthesias    -Consult nutrition   - Home tube feeds resumed   - Increase hydration while inpatient  -Consult heme-onc inpatient;   - Continue stabilization of patient  -Consult ENT given worsening \" choking\" symptoms and dysphagia   - No inpatient interventions  -Consult palliative care   - pain medications adjusted for increased somnolence    - CM for outpatient resources   - PT/OT while inpatient  -Continue with anastrozol    Sensation, choking  Assessment & Plan  -C/o progressively worsening choking sensation for past few weeks, mainly when patient lying down or asleep  -Has not been able to take anything " "by mouth except small sips of water  -Also has had significant pain around throat  -In the setting of right SCC with recent right tonsillectomy, and recent 6 cycles of cisplatin with 7 cycle held given her symptoms of progressively working worsening \"choking sensation\" and worsening fatigue  -see A&P under \"neoplastic malignant related fatigue\" for more details  -10/29/2024: CT neck soft tissue without contrast: Posttreatment changes as described without evidence for disease progression compared to PET/CT from 8/5/2024. No acute abnormality.  - Passed speech and swallow test; patient complains of pain and  - ENT: no interventions inpatient   Assessment: Dysphagia, dependent on G-tube for nutrition and medication administration    Improving     Plan:  -Home meds to be administered via G-tube  -Continue with home tube feeds  -Magic mouthwash as needed.  -Nutrition consulted, appreciate recommendations   - Resume home feeds via G tube  -Aspiration precautions, airway precautions    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HCC)  Assessment & Plan  -Briefly, right tonsillar SCC first diagnosed in 7/2023  -Of note, interval PET/CT done in 8/2024 showed interval disease progression.  Consensus of follow-up tumor board was for patient to have reevaluation by ENT, radiation oncology, as well as definitive chemoradiation  -Last few weeks completed 6 cycles of cisplatin; 7 cycles stopped in light of recent fatigue/choking sensation worsening  -10/2/2024: G-tube placement  -10/9/2024: Right tonsillectomy  -10/29/2024: CT neck soft tissue without contrast: Posttreatment changes as described without evidence for disease progression compared to PET/CT from 8/5/2024. No acute abnormality.     Plan:  -See A&P under \"neoplastic malignant related fatigue\" and \"sensation choking\"  -Consults: Heme-onc, ENT, palliative care, nutrition     - ENT with no further intervention  -Aspiration and airway precautions  -All feeds and meds through " G-tube    Malignant neoplasm of right female breast (HCC)  Assessment & Plan  -Briefly: Diagnosed with invasive carcinoma of right breast, status post partial mastectomy and radiation  -Has since been on home anastrozole 1 mg daily    Plan:  -Continue with home anastrozole    Pancytopenia due to chemotherapy (HCC)  Assessment & Plan  Found to be pancytopenic on adm, with WBC 1.76, Plt 131, Hgb 7.6, in the setting of chemoradiation therapy.   Hgb 6.2 on 10/30 AM labs; 6.2 on repeat.   Transfused with irradiated pRBC 10/30, tolerated well.   Continue to monitor CBC.  Continue to monitor VS.    S/P percutaneous endoscopic gastrostomy (PEG) tube placement (HCC)  Assessment & Plan  -Placed on 10/2/2024 for nutritional support  -Per chart review:  -TF Goal: 2 containers (474 mL) 3 times daily of Jevity 1.5  -Water Flushin mL free water flush pre/post each bolus (360 mL water) + additional 150 mL water flushes 4 times daily (600 mL water).   -TF provides: 1422 mL total volume (6 cartons), 2133 kcal, 91g protein, 1081 mL free water, 2041 mL total fluid  -Prealbumin on admission low  - Passed speech and swallow test, patient complaint of dysphagia     Assessment: Dependent on PEG tube for nutrition and p.o. med administration due to dysphagia.    Plan:  -N.p.o., continue with home tube feeds and med administration via G-tube  -Continue with home vitamins  -Nutrition consulted: continue tube feeds, will increase hydration   -Aspiration/airway precautions    Stage 3a chronic kidney disease (HCC)  Assessment & Plan  Lab Results   Component Value Date    EGFR 60 10/31/2024    EGFR 55 10/30/2024    EGFR 58 10/29/2024    CREATININE 0.96 10/31/2024    CREATININE 1.04 10/30/2024    CREATININE 1.00 10/29/2024     - Maintain adequate hydration  - Avoid nephrotoxic medications    Chemotherapy induced neutropenia (HCC)  Assessment & Plan  -WBC and ANC in the 1's    WCB & ANC     Results from last 7 days   Lab Units 10/31/24  8000  "10/30/24  0434 10/29/24  1319 10/28/24  1112   WBC Thousand/uL 1.65* 1.76* 1.53* 1.58*   TOTAL NEUT ABS Thousands/µL  --  1.16* 1.07*  --      Morning labs pending     Plan:  -Neutropenic precautions  -Continue to monitor WBC, temperature, VS, S/S of infection    Metastatic colon cancer to liver (HCC)  Assessment & Plan  -Briefly: Diagnosed in 2023 with colorectal cancer with mets to the liver  -Had a right hemicolectomy in 3/2024 due to partial obstruction.  Had a cryoablation of liver mets in 2024. Chemo.  -Per recent oncology OV note, CRC is at least locally advanced versus metastatic and is unresectable.   -Of note, interval PET/CT done in 2024 showed interval disease progression.  Consensus of follow-up tumor board was for patient to have reevaluation by ENT, radiation oncology, as well as definitive chemoradiation     Plan:  -See A&P under \"neoplastic malignant related fatigue\" and \"sensation choking\"   -Consults: Heme-onc, palliative care, nutrition     GERD (gastroesophageal reflux disease)  Assessment & Plan  Home medication: pantoprazole 40 mg daily    -Continue home medication    Mixed hyperlipidemia  Assessment & Plan  Home meds: Lipitor 40 mg  Continue home med     Primary hypertension  Assessment & Plan  Home meds: Losartan 50 mg  Systolic (24hrs), Av , Min:165 , Max:184   Diastolic (24hrs), Av, Min:85, Max:107    Uncontrolled  Plan:  -Continue with losartan 50 mg daily  - Add amlodipine 5 mg daily           Diet: Diet Enteral/Parenteral; Tube Feeding No Oral Diet; Jevity 1.5; Bolus; 474; (3x/day) - 8:00AM,12:00PM,5:00PM; 60; Before and After each Bolus    VTE Pharm PPX: Enoxaparin (Lovenox)  VTE Mech PPX: sequential compression device      Subjective:   69 y.o. female admitted 10/29/2024 for fatigue s/p likely side effect from chemotherapy    Today 24, HD# 2    Per handoff, patient without acute events overnight.   Per nursing staff, no concern or report.   Improved general " and sore throat pain compared to admission  Patient seen and examined at bedside and without questions or concerns. Patient denies CP, SOB, abdominal pain, N/V, headaches, dizziness, lightheadedness, changes in bowel movements, urinary symptoms at this time.    Medical management and treatment was discussed with patient, patient understands and agrees with plan.     Objective:     Vitals:    10/31/24 1511 10/31/24 2211 10/31/24 2304 11/01/24 0715   BP: (!) 165/107 (!) 184/85 169/94 (!) 183/91   Pulse: (!) 108 85 92 82   Resp:    16   Temp: 99 °F (37.2 °C) 99.6 °F (37.6 °C)  98 °F (36.7 °C)   TempSrc:       SpO2: 97% 97% 98% 98%     Temp:  [98 °F (36.7 °C)-99.6 °F (37.6 °C)] 98 °F (36.7 °C)  HR:  [] 82  BP: (165-184)/() 183/91  Resp:  [16] 16  SpO2:  [97 %-98 %] 98 %  O2 Device: None (Room air)  Weight (last 2 days)       None            Intake/Output Summary (Last 24 hours) at 11/1/2024 1134  Last data filed at 11/1/2024 1001  Gross per 24 hour   Intake 594 ml   Output --   Net 594 ml     Invasive Devices       Central Venous Catheter Line  Duration             Port A Cath 09/24/24 Right Chest 38 days              Drain  Duration             Gastrostomy/Enterostomy Percutaneous Interventional Gastrostomy 18 Fr. LUQ 30 days                      Physical Exam:     Physical Exam  Vitals and nursing note reviewed.   Constitutional:       General: She is not in acute distress.     Appearance: She is well-developed. She is obese. She is not ill-appearing.   HENT:      Head: Normocephalic and atraumatic.      Right Ear: External ear normal.      Left Ear: External ear normal.      Nose: Nose normal.      Mouth/Throat:      Mouth: Mucous membranes are moist.      Pharynx: Oropharynx is clear.      Comments: Increased saliva, painful swallowing  Eyes:      Conjunctiva/sclera: Conjunctivae normal.   Cardiovascular:      Rate and Rhythm: Normal rate and regular rhythm.      Pulses: Normal pulses.      Heart sounds:  Normal heart sounds. No murmur heard.  Pulmonary:      Effort: Pulmonary effort is normal. No respiratory distress.      Breath sounds: Normal breath sounds.   Abdominal:      General: Abdomen is flat. Bowel sounds are normal.      Palpations: Abdomen is soft.      Tenderness: There is no abdominal tenderness. There is no right CVA tenderness or left CVA tenderness.      Comments: PEG tube    Musculoskeletal:         General: No swelling.      Cervical back: Neck supple.      Right lower leg: No edema.      Left lower leg: No edema.   Skin:     General: Skin is warm and dry.      Capillary Refill: Capillary refill takes less than 2 seconds.   Neurological:      General: No focal deficit present.      Mental Status: She is alert and oriented to person, place, and time. Mental status is at baseline.   Psychiatric:         Mood and Affect: Mood normal.           Labs:     CBC:  Results from last 7 days   Lab Units 10/31/24  0638 10/31/24  0446 10/30/24  0749 10/30/24  0434 10/29/24  1954 10/29/24  1319 10/28/24  1112   WBC Thousand/uL 1.65*  --   --  1.76*  --  1.53* 1.58*   HEMOGLOBIN g/dL 8.7* 12.8 6.2* 6.2*  --  7.6* 7.0*   HEMATOCRIT % 25.9* 38.2 19.2* 19.1*  --  23.1* 21.7*   PLATELETS Thousands/uL 139*  --   --  131* 147* 133* 154   TOTAL NEUT ABS Thousands/µL  --   --   --  1.16*  --  1.07*  --        CMP:  Results from last 7 days   Lab Units 10/31/24  0638 10/30/24  0434 10/29/24  1319 10/28/24  1112   POTASSIUM mmol/L 4.0 3.8 4.0 3.6   CHLORIDE mmol/L 104 104 105 104   CO2 mmol/L 30 30 31 31   BUN mg/dL 25 26* 32* 31*   CREATININE mg/dL 0.96 1.04 1.00 1.10   CALCIUM mg/dL 8.5 8.2* 8.9 8.6   AST U/L 32 23 21 28   ALT U/L 28 22 22 26   ALK PHOS U/L 104 103 105* 91   EGFR ml/min/1.73sq m 60 55 58 51   MAGNESIUM mg/dL 1.7* 1.4*  --  1.3*   PHOSPHORUS mg/dL  --  3.3  --   --        Sepsis:  Results from last 7 days   Lab Units 10/30/24  0434 10/29/24  1954 10/29/24  1319   LACTIC ACID mmol/L  --  1.3  --     PROCALCITONIN ng/ml 0.17  --  0.14       Micro:  Lab Results   Component Value Date/Time    Blood Culture No Growth at 48 hrs. 10/29/2024 11:28 PM    Blood Culture No Growth at 48 hrs. 10/29/2024 07:54 PM         Imaging:     XR chest pa and lateral    Result Date: 10/30/2024  Impression: No acute cardiopulmonary disease. Workstation performed: [id number]     CT soft tissue neck with contrast    Result Date: 10/29/2024  Impression: Posttreatment changes as described without evidence for disease progression compared to PET/CT from 8/5/2024. No acute abnormality. Workstation performed: [id number]         Medications:     Current Facility-Administered Medications:     acetaminophen (TYLENOL) oral suspension 650 mg, Q6H RAFFY    amLODIPine (NORVASC) tablet 5 mg, Daily    anastrozole (ARIMIDEX) tablet 1 mg, Daily    atorvastatin (LIPITOR) tablet 40 mg, HS    cefTRIAXone (ROCEPHIN) 2,000 mg in dextrose 5 % 50 mL IVPB, Q24H, Last Rate: 2,000 mg (10/31/24 1632)    cyanocobalamin (VITAMIN B-12) tablet 1,000 mcg, Daily    diphenhydramine, lidocaine, Al/Mg hydroxide, simethicone (Magic Mouthwash) oral solution 10 mL, Q4H PRN    docusate (COLACE) oral liquid 50 mg, BID    enoxaparin (LOVENOX) subcutaneous injection 40 mg, Daily    ergocalciferol (VITAMIN D2) capsule 50,000 Units, Weekly    folic acid (FOLVITE) tablet 1 mg, Daily    gabapentin (NEURONTIN) oral solution 300 mg, Daily    gabapentin (NEURONTIN) oral solution 600 mg, HS    losartan (COZAAR) tablet 50 mg, Daily    metroNIDAZOLE (FLAGYL) tablet 500 mg, Q12H    mirtazapine (REMERON) tablet 45 mg, HS    ondansetron (ZOFRAN) oral solution 4 mg, Q4H PRN    oxyCODONE (ROXICODONE) IR tablet 5 mg, Q4H PRN    pantoprazole (PROTONIX) injection 40 mg, Q24H RAFFY    polyethylene glycol (MIRALAX) packet 17 g, Daily    potassium chloride oral solution 20 mEq, BID    pyridoxine (VITAMIN B6) tablet 50 mg, Daily      Conor Herr MD  Family Medicine, PGY-3  11:34 AM  11/1/2024

## 2024-11-01 NOTE — OCCUPATIONAL THERAPY NOTE
Occupational Therapy Evaluation     Patient Name: Maira Moura  Today's Date: 11/1/2024  Problem List  Principal Problem:    Neoplastic malignant related fatigue  Active Problems:    Primary hypertension    Mixed hyperlipidemia    Malignant neoplasm of right female breast (HCC)    GERD (gastroesophageal reflux disease)    Metastatic colon cancer to liver (HCC)    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HCC)    Chemotherapy induced neutropenia (HCC)    Stage 3a chronic kidney disease (HCC)    Sensation, choking    S/P percutaneous endoscopic gastrostomy (PEG) tube placement (HCC)    Pancytopenia due to chemotherapy (HCC)    Past Medical History  Past Medical History:   Diagnosis Date    Anemia     Arthritis     Body mass index (BMI) 40.0-44.9, adult (HCC)     Breast cancer (HCC) 12/17/2018    Cancer (HCC)     right breast, colon, liver, right tonsil    Cervical lymphadenopathy     CT neck on 3/30/2023- Large right level 2A lymphadenopathy as described above and suspicious for neoplasm.  Correlation with histopathology is recommended.  Mild asymmetric prominence of the right palatine tonsil with otherwise no definitive nodular enhancing lesions identified along the course of the aerodigestive tract.     5/26/23- FNA of this node was nonrevealing for tissue etiology, but it d    Encounter for screening for HIV 07/07/2022    Follow-up examination 04/04/2023    GERD (gastroesophageal reflux disease)     Hyperlipidemia     Hypertension     Obesity     Stroke (HCC)     TIA     Stroke (HCC)     TIA     Transaminitis 09/22/2021    Vasomotor rhinitis     Refilled flonase today. Stopped taking since January 2022     Past Surgical History  Past Surgical History:   Procedure Laterality Date    BREAST BIOPSY Right 11/23/2018    us guided bx cancer    BREAST SURGERY      COLONOSCOPY      ESOPHAGOSCOPY N/A 07/20/2023    Procedure: ESOPHAGOSCOPY;  Surgeon: David Chapa MD;  [facility];  Service: ENT     HEMICOLOECTOMY W/ ANASTOMOSIS Right 3/12/2024    Procedure: RIGHT COLECTOMY WITH ROBOTICS;  Surgeon: Vickie Israel MD;  [facility];  Service: Surgical Oncology    IR BIOPSY LIVER MASS  2023    IR CRYOABLATION  6/3/2024    IR GASTROSTOMY TUBE PLACEMENT  10/2/2024    IR PORT CHECK  2024    IR PORT PLACEMENT  2023    IR PORT STRIPPING  2024    LAPAROTOMY N/A 3/12/2024    Procedure: LAPAROTOMY EXPLORATORY W/ ROBOTICS;  Surgeon: Vickie Israel MD;  [facility];  Service: Surgical Oncology    UT BIOPSY NASOPHARYNX VISIBLE LESION SIMPLE N/A 10/9/2024    Procedure: NASOPHARYNGOSCOPY with biospy;  Surgeon: Juanito Matthew MD;  [facility];  Service: ENT    [facility] [place] [facility] W/CELL WASHG SPX N/A 2023    Procedure: BRONCHOSCOPY;  Surgeon: David Chapa MD;  [facility];  Service: ENT    UT LARYNGOSCOPY W/BIOPSY MICROSCOPE/TELESCOPE N/A 2023    Procedure: MICRODIRECT LARYNGOSCOPY WITH BIOPSY;  Surgeon: David Chapa MD;  [facility];  Service: ENT    UT LARYNGOSCOPY W/WO TRACHEOSCOPY DX EXCEPT  N/A 10/9/2024    Procedure: DIRECT LARYNGOSCOPY;  Surgeon: Juanito Matthew MD;  [facility];  Service: ENT    UT MASTECTOMY PARTIAL W/AXILLARY LYMPHADENECTOMY Right 2018    Procedure: ULTRASOUND LOCALIZED PARTIAL MASTECTOMY W/SENTINEL NODE BIOPSY POSS AXILLARY DISSECTION;  Surgeon: Zahida Coronado MD;  Location: [facility];  Service: General    UT TONSILLECTOMY PRIMARY/SECONDARY AGE 13/> N/A 10/9/2024    Procedure: TONSILLECTOMY;  Surgeon: Juanito Matthew MD;  [facility];  Service: ENT    TUBAL LIGATION      US GUIDED BREAST BIOPSY RIGHT COMPLETE Right 2018    US GUIDED INJECTION FOR RESEARCH STUDY  2018    US GUIDED INJECTION FOR RESEARCH STUDY  2018    US GUIDED INJECTION FOR RESEARCH STUDY  2019    US GUIDED LYMPH NODE BIOPSY RIGHT  2023  "0901   OT Last Visit   OT Visit Date 11/01/24   Note Type   Note type Evaluation   Pain Assessment   Pain Assessment Tool 0-10   Pain Score 7   Pain Location/Orientation Other (Comment)  (throat)   Patient's Stated Pain Goal No pain   Hospital Pain Intervention(s) Repositioned;Ambulation/increased activity   Restrictions/Precautions   Weight Bearing Precautions Per Order No   Other Precautions Bed Alarm;Chair Alarm;Multiple lines;Fall Risk;Pain  (neutropenic precautions)   Home Living   Type of Home Apartment   Home Layout One level;Elevator   Bathroom Shower/Tub Tub/shower unit   Bathroom Toilet Standard   Bathroom Equipment Shower chair   Home Equipment Cane  (not used PTA)   Additional Comments Pt reports living w/ son and dtr in 3rd floor apartment w/ elevator access. Pt owns SPC but denies using PTA.   Prior Function   Level of [place] Independent with ADLs;Independent with functional mobility;Needs assistance with IADLS   Lives With Son;Daughter   Receives Help From Family   IADLs Family/Friend/Other provides transportation;Family/Friend/Other provides meals;Family/Friend/Other provides medication management   Falls in the last 6 months 0   Vocational Retired   Comments Pt reports being I w/ ADL, FM. Son and dtr assist w/ IADLs such as cooking, cleaning, driving. Son and Dtr both work during the day but assist Pt when home. Pt states she is able to take care of herself during the day when alone   Lifestyle   Autonomy PTA, PT reports I w/ ADL and FM. Requires assist w/ IADL from son and dtr   Reciprocal Relationships Son, daughter   Service to Others Retired   Intrinsic Gratification Enjoys time w/ family   General   Family/Caregiver Present No   Subjective   Subjective \"I'm okay when they're at work\"   ADL   Where Assessed Edge of bed   Eating Assistance 4  Minimal Assistance   Grooming Assistance 5  Supervision/Setup   UB Bathing Assistance 5  Supervision/Setup   LB Bathing Assistance 4  Minimal " Assistance   UB Dressing Assistance 5  Supervision/Setup   LB Dressing Assistance 4  Minimal Assistance   Toileting Assistance  5  Supervision/Setup   Functional Assistance 4  Minimal Assistance   Bed Mobility   Supine to Sit 5  Supervision   Additional items HOB elevated;Increased time required;Verbal cues   Sit to Supine Unable to assess   Additional Comments Pt greeted in supine and left OOB in chair w/ alarm on and all needs within reahc   Transfers   Sit to Stand 5  Supervision   Additional items Increased time required;Verbal cues   Stand to Sit 5  Supervision   Additional items Increased time required;Verbal cues   Additional Comments no AD   Functional Mobility   Functional Mobility 5  Supervision   Additional Comments Pt completes short household distance mobility within room w/ S, no AD   Balance   Static Sitting Fair +   Dynamic Sitting Fair   Static Standing Fair   Dynamic Standing Fair -   Ambulatory Fair -   Activity Tolerance   Activity Tolerance Patient tolerated treatment well   Medical Staff Made Aware PT rowdy [place] w/ PT due to medical complexity and multiple comorbidities   Nurse Made Aware RN cleared   RUE Assessment   RUE Assessment WFL   LUE Assessment   LUE Assessment WFL   Hand Function   Gross Motor Coordination Functional   Fine Motor Coordination Functional   Sensation   Light Touch No apparent deficits   Cognition   Overall Cognitive Status WFL   Arousal/Participation Alert;Responsive;Cooperative   Attention Attends with cues to redirect   Orientation Level Oriented X4   Memory Within functional limits   Following Commands Follows one step commands without difficulty   Comments Pt is initially lethargic though becomes more alert/awake w/ time. Pt is pleasant and cooperative.   Assessment   Limitation Decreased ADL status;Decreased endurance;Decreased self-care trans;Decreased high-level ADLs   Prognosis Good   Assessment Pt is a 69 y.o. female seen for OT evaluation s/p admission to  [facility] on 10/29/2024 with Neoplastic malignant related fatigue. Pt  has a past medical history of Anemia, Arthritis, Body mass index (BMI) 40.0-44.9, adult (HCC), Breast cancer (HCC), Cancer (HCC), Cervical lymphadenopathy, Encounter for screening for HIV, Follow-up examination, GERD (gastroesophageal reflux disease), Hyperlipidemia, Hypertension, Obesity, Stroke (HCC), Stroke (HCC), Transaminitis, and Vasomotor rhinitis. Pt with active OT evaluation/treatment and activity orders. Pt reports living in 3rd floor apartment w/ son/dtr w/ elevator. PTA, Pt was I w/ ADL and functional mobility, required assist w/ IADL, was not driving and was not DME at baseline. Pt agreeable and willing to participate in OT evaluation. Pt was greeted supine and left OOB in chair w/ alarm activated and all needs within reach. During evaluation, pt is Supervision UB ADLs, bed mobility, STS, FM; Min A LB ADLs. Pt currently presents with impairments in the following categories -limited home support, difficulty performing ADLS, and difficulty performing IADLS  activity tolerance, endurance, standing balance/tolerance, and sitting balance/tolerance. These impairments, as well as pt's fatigue and pain  limit pt's ability to safely engage in all baseline areas of occupation, includingeating, grooming, bathing, dressing, toileting, functional mobility/transfers, community mobility, meal prep, social participation , and leisure activities . The patient's raw score on the AM-PAC Daily Activity Inpatient Short Form is 18. A raw score of less than 19 suggests the patient may benefit from discharge to post-acute rehabilitation services, however Pt w/ adequate support at home and states no concerns for d/c to home. Please refer to the recommendation of the Occupational Therapist for safe discharge planning. Pt would benefit from continued acute OT services throughout hospital course in order to maximize Pt's independence and overall  occupational performance. Plan for OT interventions 2-3x per week. From OT standpoint,  recommend Level III (Minimum Resource Intensity) upon d/c when pt medically stable to d/c from acute care. Will continue to follow.   Goals   Patient Goals to go home   LTG Time Frame 10-14   Long Term Goal See goals below   Plan   Treatment Interventions ADL retraining;Functional transfer training;UE strengthening/ROM;Endurance training;Equipment evaluation/education;Patient/family training;Compensatory technique education;Continued evaluation;Energy conservation;Activityengagement   Goal Expiration Date 11/15/24   OT Treatment Day 0   OT Frequency 2-3x/wk   Discharge Recommendation   Rehab Resource Intensity Level, OT III (Minimum Resource Intensity)   AM-PAC Daily Activity Inpatient   Lower Body Dressing 3   Bathing 3   Toileting 3   Upper Body Dressing 3   Grooming 3   Eating 3   Daily Activity Raw Score 18   Daily Activity Standardized Score (Calc for Raw Score >=11) 38.66   AM-PAC Applied Cognition Inpatient   Following a Speech/Presentation 4   Understanding Ordinary Conversation 4   Taking Medications 4   Remembering Where Things Are Placed or Put Away 4   Remembering List of 4-5 Errands 4   Taking Care of Complicated Tasks 4   Applied Cognition Raw Score 24   Applied Cognition Standardized Score 62.21   End of Consult   Education Provided Yes   Patient Position at End of Consult Bedside chair;Bed/Chair alarm activated;All needs within reach   Nurse Communication Nurse aware of consult     OT Goals:     - Pt will be Mod I with LB ADL by the time of discharge.    - Pt will be Mod I with UB ADL by the time of discharge.    - Pt will complete toileting routine (transfers, hygiene, and clothing management) with Mod I  to maximize independence and return to prior level of function.    - Pt will complete bed mobility supine >< sit w/ Mod I to maximize independence and return home.    - Pt will transfer to bed, chair, and toilet  w/ Mod I using AD / DME as needed to maximize independence and reduce burden of care.     - Pt will ambulate household distances w/ Mod I using least restrictive device to maximize independence and return home.     - Pt will increase activity tolerance (and sitting tolerance) by eating all meals OOB in the chair.     - Pt will increase standing tolerance to 15 minutes to maximize independence w/ grooming tasks standing at the sink.     - Pt will tolerate therapeutic activities for greater than 30 minutes in order to increase tolerance for functional activities.     - Pt will participate in ongoing OT assessment of cognitive skills to assist with safe d/c planning/recommendations.       Darshana Peck, RONNIE, OTR/L

## 2024-11-01 NOTE — DISCHARGE SUMMARY
"Discharge Summary - Family Medicine   Name: Maira Moura 69 y.o. female I MRN: [id number]  Unit/Bed#: -01 I Date of Admission: 10/29/2024   Date of Service: 2024 I Hospital Day: 5    Admission Date: 10/29/2024 1204  Discharge Date: 24  Admitting Diagnosis: Cancer associated pain [G89.3]  Acute pain [R52]  Sensation, choking [R09.89]  On tube feeding diet [Z78.9]  S/P percutaneous endoscopic gastrostomy (PEG) tube placement (HCC) [Z93.1]  Fatigue, unspecified type [R53.83]  Discharge Diagnosis:       Primary hypertension  Home meds: Losartan 50 mg  Systolic (24hrs), Av , Min:136 , Max:154   Diastolic (24hrs), Av, Min:69, Max:99    Improved control     Plan:  -Continue with losartan 50 mg daily  - Added amlodipine 5 mg daily     Mixed hyperlipidemia  Home meds: Lipitor 40 mg  Continue home med       Malignant neoplasm of right female breast (HCC)  -Briefly: Diagnosed with invasive carcinoma of right breast, status post partial mastectomy and radiation  -Has since been on home anastrozole 1 mg daily    Plan:  -Continue with home anastrozole      GERD (gastroesophageal reflux disease)  Home medication: pantoprazole 40 mg daily     -Continue home medication     Metastatic colon cancer to liver (HCC)  -Briefly: Diagnosed in 2023 with colorectal cancer with mets to the liver  -Had a right hemicolectomy in 3/2024 due to partial obstruction.  Had a cryoablation of liver mets in 2024. Chemo.  -Per recent oncology OV note, CRC is at least locally advanced versus metastatic and is unresectable.   -Of note, interval PET/CT done in 2024 showed interval disease progression.  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  Consensus of follow-up tumor board was for patient to have reevaluation by ENT, radiation oncology, as well as definitive chemoradiation  -Last few weeks completed 6 cycles of cisplatin; 7 cycles stopped in light of recent fatigue/choking sensation worsening  -10/2/2024: G-tube placement  -10/9/2024: Right tonsillectomy  -10/29/2024: CT neck soft tissue without contrast: Posttreatment changes as described without evidence for disease progression compared to PET/CT from 8/5/2024. No acute abnormality.     Plan:  -See A&P under \"neoplastic malignant related fatigue\" and \"sensation choking\"  -Consults: Heme-onc, ENT, palliative care, nutrition     - ENT with no further intervention  -Aspiration and airway precautions  -All feeds and meds through G-tube -    - upgraded to pureed foods per speech, odynophagia per patient continues    Chemotherapy induced neutropenia (HCC)  -WBC and ANC in the 1's    WCB & ANC     Results from last 7 days   Lab Units 11/04/24  0516 11/03/24  0749 11/02/24  0523 11/01/24  1507 10/31/24  0638 10/30/24  0434 10/29/24  1319   WBC Thousand/uL 2.02* 2.01* 1.97* 1.86* 1.65* 1.76* 1.53*   TOTAL NEUT ABS Thousands/µL 1.15*  --  1.28*  1.14* 1.40*  --  1.16* 1.07*     Improving    Plan:  -Neutropenic precautions  -Continue to monitor WBC, temperature, VS, S/S of infection    Stage 3a chronic kidney disease (HCC)  Lab Results   Component Value Date    EGFR 64 11/04/2024    EGFR 65 11/03/2024    EGFR 65 11/02/2024    CREATININE 0.92 11/04/2024    CREATININE 0.90 11/03/2024    CREATININE 0.90 11/02/2024     - Maintain adequate hydration  - Avoid nephrotoxic medications    Neoplastic malignant related fatigue  -69-year-old female with an extensive oncologic history who was a direct admit from oncologic office due to persistent new fatigue.  Of note, patient " recently was started on cisplatin last month for her right tonsillar SCC.  -Brief oncologic history:   -Late 2018 patient was found to have invasive cancer of the right breast; status post partial mastectomy, radiation --has since been on anastrozole  -July 2023: SCC of right tonsil + colorectal cancer with metastasis to the liver.  Briefly, patient had initially presented in 3/2023 per chart review to PCP with complaints of sore throat.  Had imaging and ultimately biopsy done that was concerning for carcinoma.  Eventually got a PET/CT which showed multiple hypermetabolic lesions in chest, abdomen and pelvis.   -Colorectal cancer with mets to liver:   -Had a right hemicolectomy in 3/2024 due to partial obstruction.  Had a cryoablation of liver mets in 6/2024. Chemo.    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  Subsequent tumor board culminated in recommendations for reevaluation by ENT and radiation oncology, as well as definitive chemoradiation treatment.  -In the last 6 weeks, patient has had cisplatin; seventh week has been held by heme-onc due to poor tolerance  -10/2 had gastrostomy tube placed for nutritional support by IR  -10/29 presented to outpatient heme-onc for follow-up.  Has significant fatigue and pain compared to previous visits.  Also complained of choking sensation worsening in the last few weeks.  Subsequently recommended to be transferred for direct admit to [facility] for further evaluation.    Assessment: Worsening fatigue, mild tachycardia, along with chemo-induced neutropenia differentials include infection, dehydration, intolerance to chemo    Overall improving     Plan:  -Infectious workup initiated:   -BCx x 2     - No  "growth at 72h   -UA pending: not collected prior to antibiotics start    -CXR negative; flu/COVID-negative   - Empirically placed on antibiotics: ceftriaxone/metronidazole starting 10/30/24; previously on zosyn for 2 doses    - discontinued all antibiotics on 11/1/24  -Lactic acid, procalcitonin on admission WNL  -Neutropenia precautions  -Closely monitor I/O  -Monitor WBC, VS, other S/S of infection  -Continue with G tube feeds as noted by outpatient  -Continue with home gabapentin; per chart review has been having bilateral leg paresthesias    -Consult nutrition   - Home tube feeds resumed   - Increase hydration while inpatient  -Consult heme-onc inpatient;   - Continue stabilization of patient, as of 11/2 heme/onc not requiring any further inpatient work up  -Consult ENT given worsening \" choking\" symptoms and dysphagia   - No inpatient interventions  -Consult palliative care   - pain medications adjusted for increased somnolence    - CM for outpatient resources   - PT/OT while inpatient  -Continue with anastrozol    Sensation, choking  -C/o progressively worsening choking sensation for past few weeks, mainly when patient lying down or asleep  -Has not been able to take anything by mouth except small sips of water  -Also has had significant pain around throat  -In the setting of right SCC with recent right tonsillectomy, and recent 6 cycles of cisplatin with 7 cycle held given her symptoms of progressively working worsening \"choking sensation\" and worsening fatigue  -see A&P under \"neoplastic malignant related fatigue\" for more details  -10/29/2024: CT neck soft tissue without contrast: Posttreatment changes as described without evidence for disease progression compared to PET/CT from 8/5/2024. No acute abnormality.  - Passed speech and swallow test; patient complains of pain and  - ENT: no interventions inpatient   Assessment: Dysphagia, dependent on G-tube for nutrition and medication " administration    Improving     Plan:  -Home meds to be administered via G-tube  -Continue with home tube feeds - will consult speech to assess progressing diet per patient's request  -Magic mouthwash as needed.  -Nutrition consulted, appreciate recommendations   - Resume home feeds via G tube  - Speech evaluation:    - passed, upgraded to pureed diet as tolerated   - patient still complains of odynophagia    - primary nutrition still continued with PEG tube   -Aspiration precautions, airway precautions    S/P percutaneous endoscopic gastrostomy (PEG) tube placement (HCC)  -Placed on 10/2/2024 for nutritional support  -Per chart review:  -TF Goal: 2 containers (474 mL) 3 times daily of Jevity 1.5  -Water Flushin mL free water flush pre/post each bolus (360 mL water) + additional 150 mL water flushes 4 times daily (600 mL water).   -TF provides: 1422 mL total volume (6 cartons), 2133 kcal, 91g protein, 1081 mL free water, 2041 mL total fluid  -Prealbumin on admission low  - Passed speech and swallow test, patient complaint of dysphagia     Assessment: Dependent on PEG tube for nutrition and p.o. med administration due to dysphagia.    Plan:  -N.p.o., continue with home tube feeds and med administration via G-tube - will assess progressing diet per patient's request  -Continue with home vitamins  -Nutrition consulted: continue tube feeds with increase hydration   -Aspiration/airway precautions    Pancytopenia due to chemotherapy (HCC)  Found to be pancytopenic on adm, with WBC 1.76, Plt 131, Hgb 7.6, in the setting of chemoradiation therapy.   Hgb 6.2 on 10/30 AM labs; 6.2 on repeat.   Transfused with irradiated pRBC 10/30, tolerated well.   Continue to monitor CBC.  Continue to monitor VS.      Palliative care encounter  Palliative Diagnosis: R tonsillar SCC    Goals:   Continue full code at this time  Palliative will follow for ongoing goals of care discussions as situation evolves.    Social  Support:  Supportive listening provided  Normalized experience of patient  Provided anxiety containment  Advocated for patient/family with interdisciplinary team  Mediated conflict    Care Coordination  Case discussed with primary team    Follow-up  We appreciate the opportunity to participate in this patient's care.   We will continue to follow while admitted.    Please do not hesitate to contact our on-call provider through [employer] Secure Chat or contact [phone] should there be an acute change or other symptom control concerns.     Right tonsillar squamous cell carcinoma (HCC)  Summary of Recommendations:  Primary Medical Oncologist discussed case with Radiation Oncology - Hold further radiation at this time.  Resumption of outpatient radiation this week, is dependent on clinical course.  Continue pain-regimen, as follows:  Continue Acetaminophen 650 mg Q6HR Scheduled.  Continue Oxycodone 5 mg Per-Tube Q4HR PRN.  Continue Gabapentin 300 mg Per-Tube Daily, and 600 mg Per-Tube Bedtime (Scheduled).  Continue anti-emetic regimen, as follows: Ondansetron 4 mg Per-Tube Q4HR PRN (Nausea).  Continue bowel-regimen to prevent opiate-induced constipation: Miralax 17 Grams Per Tube Daily.  Recommend Physical and Occupational Therapy     HPI:   Maira Moura is a 69 y.o. female who presents with PMH of invasive ductal carcinoma status post mastectomy 2018, metastatic adenocarcinoma of the colon status post right hemicolectomy on March 2024, metastatic disease to the liver status post cryoablation on June 2024, unresectable squamous cell carcinoma of the right tonsil on PEG tube feeds, chemotherapy, radiation, on daily anastrozole presented to the ED directly from outpatient heme-onc visit for ill appearance, uncontrolled pain, increased fatigue and choking sensations.   Previously, patient was on cisplatin for 6 weeks with concurrent radiation however the 7th week was omitted due to concerns for increased toxicity with  increased outpatient infusions for fluids and electrolyte replacement. PET/CT scan (8/5/24) showed concerns for suspicious progression of her malignancy; ENT held a Multidisciplinary Head and Neck Tumor Board with a consensus for re-evaluation by Otolaryngology for DL with biopsy of left tonsil and Radiation Oncology to consider XRT.   Due to concern for possible infection, increased toxicity or progression of her condition, patient was recommended for direct admission to [facility] for further evaluation.      ED Management:   CBC showed concern for worsening leukopenia, anemia and thrombocytopenia.   CT soft tissue neck showed post radiation changes with induration in the subcutaneous fat and effacement of the oropharyngeal and supraglottic laryngeal fat planes. No evidence for recurrent or residual right tonsillar mass. ll-defined treated right level 2 claudio metastasis (series 2 image 43) measuring 1.8 x 1.8 cm. On the initial exam from 3/30/2023, this measured 3.7 x 2.2 cm, and is difficult to measure on recent PET/CT. 1.3 x 0.7 cm right level 3 claudio metastasis (series 2 image 49) grossly stable compared to recent PET/CT. No new adenopathy. Scattered subcentimeter shotty left cervical lymph nodes are similar to prior exam.    Procedures Performed: No orders of the defined types were placed in this encounter.      Summary of Hospital Course:   Patient was admitted to [facility] for further evaluation.  Due to worsening leukopenia, infectious workup was performed that showed worsening leukopenia. Lactic acid and procalcitonin were negative. CXR was negative. Patient was started on emperic antibiotics with zosyn, switched to ceftriaxone and metronidazole. Placed on aspiration, airway and neutropenic precautions.   ENT consulted: pain likely result of recent b/l tonsillectomy; no inpatient interventions  Rad/Onc consulted: fatigue likely result of outpatient concurrent chemotherapy, received 28 out of 35 fractions.  Plan for break from chemotherapy and stabilize inpatient and consider resuming therapy once discharged. No inpatient interventions.   Palliative consulted: Pain regimen adjusted to decrease patient somnolence.   Heme/Onc consulted: Anemic at Hgb at 6.6 on first day of admission. Transfused with 2 units of irradiated blood on 10/30/24 with improved anemia. Additional heme work up ordered with outpatient follow up.   Nutrition consulted: Tube feeds from home regimen resumed.   Speech pathology passed for safe oral intake. However patient unable to tolerate diet upgrade due to continued odynophagia. Recommended no spicy foods due to worsening throat irritation.   PT/OT consulted: Level III care       Significant Findings, Care, Treatment and Services Provided:   2 unites irradiated PRBCs    Complications: none    Condition at Discharge: stable       Discharge instructions/Information to patient and family:   See After Visit Summary (AVS) for information provided to patient and family.      Provisions for Follow-Up Care:  See after visit summary for information related to follow-up care and any pertinent home health orders.      PCP: Fanta Turner MD    Disposition: Home    Planned Readmission: No     Discharge Medications:  See after visit summary for reconciled discharge medications provided to patient and family.      Discharge Statement:  I have spent a total time of 30 minutes in caring for this patient on the day of the visit/encounter. .

## 2024-11-01 NOTE — QUICK NOTE
Morning labs not resulted, states collected. Confirmed with morning nurse that labs were collected. Called lab, states they do not have the samples.   Reordered labs for asap draw.

## 2024-11-01 NOTE — PLAN OF CARE
Problem: OCCUPATIONAL THERAPY ADULT  Goal: Performs self-care activities at highest level of function for planned discharge setting.  See evaluation for individualized goals.  Description: Treatment Interventions: ADL retraining, Functional transfer training, UE strengthening/ROM, Endurance training, Equipment evaluation/education, Patient/family training, Compensatory technique education, Continued evaluation, Energy conservation, Activityengagement          See flowsheet documentation for full assessment, interventions and recommendations.   Outcome: Progressing  Note: Limitation: Decreased ADL status, Decreased endurance, Decreased self-care trans, Decreased high-level ADLs  Prognosis: Good  Assessment: Pt is a 69 y.o. female seen for OT evaluation s/p admission to [facility] on 10/29/2024 with Neoplastic malignant related fatigue. Pt  has a past medical history of Anemia, Arthritis, Body mass index (BMI) 40.0-44.9, adult (HCC), Breast cancer (HCC), Cancer (HCC), Cervical lymphadenopathy, Encounter for screening for HIV, Follow-up examination, GERD (gastroesophageal reflux disease), Hyperlipidemia, Hypertension, Obesity, Stroke (HCC), Stroke (HCC), Transaminitis, and Vasomotor rhinitis. Pt with active OT evaluation/treatment and activity orders. Pt reports living in 3rd floor apartment w/ son/dtr w/ elevator. PTA, Pt was I w/ ADL and functional mobility, required assist w/ IADL, was not driving and was not DME at baseline. Pt agreeable and willing to participate in OT evaluation. Pt was greeted supine and left OOB in chair w/ alarm activated and all needs within reach. During evaluation, pt is Supervision UB ADLs, bed mobility, STS, FM; Min A LB ADLs. Pt currently presents with impairments in the following categories -limited home support, difficulty performing ADLS, and difficulty performing IADLS  activity tolerance, endurance, standing balance/tolerance, and sitting balance/tolerance. These  impairments, as well as pt's fatigue and pain  limit pt's ability to safely engage in all baseline areas of occupation, includingeating, grooming, bathing, dressing, toileting, functional mobility/transfers, community mobility, meal prep, social participation , and leisure activities . The patient's raw score on the AM-PAC Daily Activity Inpatient Short Form is 18. A raw score of less than 19 suggests the patient may benefit from discharge to post-acute rehabilitation services, however Pt w/ adequate support at home and states no concerns for d/c to home. Please refer to the recommendation of the Occupational Therapist for safe discharge planning. Pt would benefit from continued acute OT services throughout hospital course in order to maximize Pt's independence and overall occupational performance. Plan for OT interventions 2-3x per week. From OT standpoint,  recommend Level III (Minimum Resource Intensity) upon d/c when pt medically stable to d/c from acute care. Will continue to follow.     Rehab Resource Intensity Level, OT: III (Minimum Resource Intensity)

## 2024-11-01 NOTE — PHYSICAL THERAPY NOTE
Physical Therapy Evaluation     Patient's Name: Maira Moura    Admitting Diagnosis  Cancer associated pain [G89.3]  Acute pain [R52]  Sensation, choking [R09.89]  On tube feeding diet [Z78.9]  S/P percutaneous endoscopic gastrostomy (PEG) tube placement (HCC) [Z93.1]  Fatigue, unspecified type [R53.83]    Problem List  Patient Active Problem List   Diagnosis    Primary hypertension    Mixed hyperlipidemia    Malignant neoplasm of right female breast (HCC)    Vitamin D deficiency    Prediabetes    Right thyroid nodule    GERD (gastroesophageal reflux disease)    Obesity    Metastatic colon cancer to liver (HCC)    Right tonsillar squamous cell carcinoma (HCC)    Poor appetite    Nutritional anemia    Dehydration    Drug-induced constipation    Chemotherapy induced neutropenia (HCC)    Stage 3a chronic kidney disease (HCC)    Thrombocytopenia (HCC)    Neuropathy    Diastolic dysfunction    Hypokalemia    Leukemoid reaction    GOGO (acute kidney injury) (HCC)    Acute post-operative pain    At risk for constipation    At risk for delirium    Advance care planning    Physical deconditioning    History of CVA (cerebrovascular accident)    Chronic nasal congestion    Elevated LFTs    Vitamin B12 deficiency    Neoplastic malignant related fatigue    Sensation, choking    S/P percutaneous endoscopic gastrostomy (PEG) tube placement (HCC)    Pancytopenia due to chemotherapy (HCC)       Past Medical History  Past Medical History:   Diagnosis Date    Anemia     Arthritis     Body mass index (BMI) 40.0-44.9, adult (HCC)     Breast cancer (HCC) 12/17/2018    Cancer (HCC)     right breast, colon, liver, right tonsil    Cervical lymphadenopathy     CT neck on 3/30/2023- Large right level 2A lymphadenopathy as described above and suspicious for neoplasm.  Correlation with histopathology is recommended.  Mild asymmetric prominence of the right palatine tonsil with otherwise no definitive nodular enhancing lesions identified  along the course of the aerodigestive tract.     23- FNA of this node was nonrevealing for tissue etiology, but it d    Encounter for screening for HIV 2022    Follow-up examination 2023    GERD (gastroesophageal reflux disease)     Hyperlipidemia     Hypertension     Obesity     Stroke (HCC)     TIA     Stroke (HCC)     TIA     Transaminitis 2021    Vasomotor rhinitis     Refilled flonase today. Stopped taking since 2022       Past Surgical History  Past Surgical History:   Procedure Laterality Date    BREAST BIOPSY Right 2018    us guided bx cancer    BREAST SURGERY      COLONOSCOPY      ESOPHAGOSCOPY N/A 2023    Procedure: ESOPHAGOSCOPY;  Surgeon: David Chapa MD;  [facility];  Service: ENT    HEMICOLOECTOMY W/ ANASTOMOSIS Right 3/12/2024    Procedure: RIGHT COLECTOMY WITH ROBOTICS;  Surgeon: Vickie Israel MD;  [facility];  Service: Surgical Oncology    IR BIOPSY LIVER MASS  2023    IR CRYOABLATION  6/3/2024    IR GASTROSTOMY TUBE PLACEMENT  10/2/2024    IR PORT CHECK  2024    IR PORT PLACEMENT  2023    IR PORT STRIPPING  2024    LAPAROTOMY N/A 3/12/2024    Procedure: LAPAROTOMY EXPLORATORY W/ ROBOTICS;  Surgeon: Vickie Israel MD;  [facility];  Service: Surgical Oncology    MA BIOPSY NASOPHARYNX VISIBLE LESION SIMPLE N/A 10/9/2024    Procedure: NASOPHARYNGOSCOPY with biospy;  Surgeon: Juanito Matthew MD;  [facility];  Service: ENT    MA BRNCC INCL FLUOR GDNCE DX W/CELL WASHG SPX N/A 2023    Procedure: BRONCHOSCOPY;  Surgeon: David Chapa MD;  [facility];  Service: ENT    MA LARYNGOSCOPY W/BIOPSY MICROSCOPE/TELESCOPE N/A 2023    Procedure: MICRODIRECT LARYNGOSCOPY WITH BIOPSY;  Surgeon: David Chapa MD;  [facility];  Service: ENT    MA LARYNGOSCOPY W/WO TRACHEOSCOPY DX EXCEPT  N/A 10/9/2024    Procedure: DIRECT LARYNGOSCOPY;  Surgeon: Juanito  Varsha Matthew MD;  [facility];  Service: ENT    AK MASTECTOMY PARTIAL W/AXILLARY LYMPHADENECTOMY Right 12/20/2018    Procedure: ULTRASOUND LOCALIZED PARTIAL MASTECTOMY W/SENTINEL NODE BIOPSY POSS AXILLARY DISSECTION;  Surgeon: Zahida Coronado MD;  Location: [facility];  Service: General    AK TONSILLECTOMY PRIMARY/SECONDARY AGE 12/> N/A 10/9/2024    Procedure: TONSILLECTOMY;  Surgeon: Juanito Matthew MD;  [facility];  Service: ENT    TUBAL LIGATION      US GUIDED BREAST BIOPSY RIGHT COMPLETE Right 11/23/2018    US GUIDED INJECTION FOR RESEARCH STUDY  12/20/2018    US GUIDED INJECTION FOR RESEARCH STUDY  12/07/2018    US GUIDED INJECTION FOR RESEARCH STUDY  05/03/2019    US GUIDED LYMPH NODE BIOPSY RIGHT  05/26/2023 11/01/24 0902   PT Last Visit   PT Visit Date 11/01/24   Note Type   Note type Evaluation   Pain Assessment   Pain Assessment Tool 0-10   Pain Score 7   Pain Location/Orientation Other (Comment)  (throat)   Restrictions/Precautions   Weight Bearing Precautions Per Order No   Other Precautions Bed Alarm;Chair Alarm;Multiple lines;Fall Risk;Pain   Home Living   Type of Home Apartment   Home Layout One level;Elevator   Bathroom Shower/Tub Tub/shower unit   Bathroom Toilet Standard   Bathroom Equipment Shower chair   Home Equipment Cane  (denies use PTA)   Prior Function   Level of [place] Independent with ADLs;Independent with functional mobility;Needs assistance with IADLS   Lives With Son;Daughter   Falls in the last 6 months 0   General   Family/Caregiver Present No   Cognition   Overall Cognitive Status WFL   Arousal/Participation Responsive   Memory Within functional limits   Following Commands Follows one step commands without difficulty   Subjective   Subjective Pleasant and agreeable to participate   RLE Assessment   RLE Assessment   (functionally 3+/5)   LLE Assessment   LLE Assessment   (functionally 3+/5)   Bed Mobility   Supine to Sit 5  Supervision    Additional items HOB elevated;Increased time required   Additional Comments Left OOB in chair with chair alarm intact and all needs in reach.   Transfers   Sit to Stand 5  Supervision   Additional items Increased time required   Stand to Sit 5  Supervision   Additional items Increased time required   Additional Comments without AD   Ambulation/Elevation   Gait pattern Excessively slow;Short stride  (mild unsteadiness)   Gait Assistance 5  Supervision   Assistive Device Other (Comment);None   Distance 15 ft x 2   Balance   Static Sitting Fair +   Dynamic Sitting Fair   Static Standing Fair   Dynamic Standing Fair -   Ambulatory Fair -   Activity Tolerance   Activity Tolerance Patient tolerated treatment well   Medical Staff Made Aware OT Darshana   Nurse Made Aware RN cleared pt to be seen by PT   Assessment   Prognosis Good   Assessment Pt seen for high complexity PT evaluation due to decrease in functional mobility status compared to baseline.  Pt with active PT eval/treat orders at this time.  Pt is a 69 y.o. F who presented to [facility] with worsening pain, increasing fatigue, choking sensations on 10/29/24.  Pt  has a past medical history of Anemia, Arthritis, Body mass index (BMI) 40.0-44.9, adult (HCC), Breast cancer (HCC) (12/17/2018), Cancer (HCC), Cervical lymphadenopathy, Encounter for screening for HIV (07/07/2022), Follow-up examination (04/04/2023), GERD (gastroesophageal reflux disease), Hyperlipidemia, Hypertension, Obesity, Stroke (HCC), Stroke (HCC), Transaminitis (09/22/2021), and Vasomotor rhinitis.  Pt resides with son and daughter in apartment with elevator to enter.  Pt requires supervision for all mobility at this time.  Pt left upright in bedside chair with chair alarm intact and with all needs in reach.  Pt denies any concerns regarding mobility upon DC. PT to DC pt as pt has no further acute skilled PT needs and recommending level III.  The patient's AM-PAC Basic Mobility  Inpatient Short Form Raw Score is 18. A Raw score of greater than 16 suggests the patient may benefit from discharge to home. Please also refer to the recommendation of the Physical Therapist for safe discharge planning.   Barriers to Discharge None   Goals   Patient Goals to go home   Discharge Recommendation   Rehab Resource Intensity Level, PT III (Minimum Resource Intensity)   Equipment Recommended   (recommend use of pt personal cane)   AM-PAC Basic Mobility Inpatient   Turning in Flat Bed Without Bedrails 3   Lying on Back to Sitting on Edge of Flat Bed Without Bedrails 3   Moving Bed to Chair 3   Standing Up From Chair Using Arms 3   Walk in Room 3   Climb 3-5 Stairs With Railing 3   Basic Mobility Inpatient Raw Score 18   Basic Mobility Standardized Score 41.05   [facility] Highest Level Of Mobility   -HLM Goal 6: Walk 10 steps or more   -HLM Achieved 7: Walk 25 feet or more   Modified Viola Scale   Modified Holden Scale 3         Shima Kee, PT, DPT

## 2024-11-01 NOTE — PLAN OF CARE
Problem: PAIN - ADULT  Goal: Verbalizes/displays adequate comfort level or baseline comfort level  Description: Interventions:  - Encourage patient to monitor pain and request assistance  - Assess pain using appropriate pain scale  - Administer analgesics based on type and severity of pain and evaluate response  - Implement non-pharmacological measures as appropriate and evaluate response  - Consider cultural and social influences on pain and pain management  - Notify physician/advanced practitioner if interventions unsuccessful or patient reports new pain  Outcome: Progressing     Problem: INFECTION - ADULT  Goal: Absence or prevention of progression during hospitalization  Description: INTERVENTIONS:  - Assess and monitor for signs and symptoms of infection  - Monitor lab/diagnostic results  - Monitor all insertion sites, i.e. indwelling lines, tubes, and drains  - Monitor endotracheal if appropriate and nasal secretions for changes in amount and color  - [place] appropriate cooling/warming therapies per order  - Administer medications as ordered  - Instruct and encourage patient and family to use good hand hygiene technique  - Identify and instruct in appropriate isolation precautions for identified infection/condition  Outcome: Progressing  Goal: Absence of fever/infection during neutropenic period  Description: INTERVENTIONS:  - Monitor WBC    Outcome: Progressing     Problem: SAFETY ADULT  Goal: Patient will remain free of falls  Description: INTERVENTIONS:  - Educate patient/family on patient safety including physical limitations  - Instruct patient to call for assistance with activity   - Consult OT/PT to assist with strengthening/mobility   - Keep Call bell within reach  - Keep bed low and locked with side rails adjusted as appropriate  - Keep care items and personal belongings within reach  - Initiate and maintain comfort rounds  - Make Fall Risk Sign visible to staff    - Apply yellow socks and  bracelet for high fall risk patients  - Consider moving patient to room near nurses station  Outcome: Progressing  Goal: Maintain or return to baseline ADL function  Description: INTERVENTIONS:  -  Assess patient's ability to carry out ADLs; assess patient's baseline for ADL function and identify physical deficits which impact ability to perform ADLs (bathing, care of mouth/teeth, toileting, grooming, dressing, etc.)  - Assess/evaluate cause of self-care deficits   - Assess range of motion  - Assess patient's mobility; develop plan if impaired  - Assess patient's need for assistive devices and provide as appropriate  - Encourage maximum independence but intervene and supervise when necessary  - Involve family in performance of ADLs  - Assess for home care needs following discharge   - Consider OT consult to assist with ADL evaluation and planning for discharge  - Provide patient education as appropriate  Outcome: Progressing  Goal: Maintains/Returns to pre admission functional level  Description: INTERVENTIONS:  - Perform AM-PAC 6 Click Basic Mobility/ Daily Activity assessment daily.  - Set and communicate daily mobility goal to care team and patient/family/caregiver.   - Collaborate with rehabilitation services on mobility goals if consulted  - Out of bed for toileting  - Record patient progress and toleration of activity level   Outcome: Progressing     Problem: DISCHARGE PLANNING  Goal: Discharge to home or other facility with appropriate resources  Description: INTERVENTIONS:  - Identify barriers to discharge w/patient and caregiver  - Arrange for needed discharge resources and transportation as appropriate  - Identify discharge learning needs (meds, wound care, etc.)  - Arrange for interpretive services to assist at discharge as needed  - Refer to Case Management Department for coordinating discharge planning if the patient needs post-hospital services based on physician/advanced practitioner order or complex  needs related to functional status, cognitive ability, or social support system  Outcome: Progressing     Problem: Knowledge Deficit  Goal: Patient/family/caregiver demonstrates understanding of disease process, treatment plan, medications, and discharge instructions  Description: Complete learning assessment and assess knowledge base.  Interventions:  - Provide teaching at level of understanding  - Provide teaching via preferred learning methods  Outcome: Progressing     Problem: Nutrition/Hydration-ADULT  Goal: Nutrient/Hydration intake appropriate for improving, restoring or maintaining nutritional needs  Description: Monitor and assess patient's nutrition/hydration status for malnutrition. Collaborate with interdisciplinary team and initiate plan and interventions as ordered.  Monitor patient's weight and dietary intake as ordered or per policy. Utilize nutrition screening tool and intervene as necessary. Determine patient's food preferences and provide high-protein, high-caloric foods as appropriate.     INTERVENTIONS:  - Monitor oral intake, urinary output, labs, and treatment plans  - Assess nutrition and hydration status and recommend course of action  - Evaluate amount of meals eaten  - Assist patient with eating if necessary   - Allow adequate time for meals  - Recommend/ encourage appropriate diets, oral nutritional supplements, and vitamin/mineral supplements  - Order, calculate, and assess calorie counts as needed  - Recommend, monitor, and adjust tube feedings and TPN/PPN based on assessed needs  - Assess need for intravenous fluids  - Provide specific nutrition/hydration education as appropriate  - Include patient/family/caregiver in decisions related to nutrition  Outcome: Progressing

## 2024-11-02 LAB
ALBUMIN SERPL BCG-MCNC: 2.8 G/DL (ref 3.5–5)
ALP SERPL-CCNC: 107 U/L (ref 34–104)
ALT SERPL W P-5'-P-CCNC: 22 U/L (ref 7–52)
ANION GAP SERPL CALCULATED.3IONS-SCNC: 8 MMOL/L (ref 4–13)
ANISOCYTOSIS BLD QL SMEAR: PRESENT
AST SERPL W P-5'-P-CCNC: 24 U/L (ref 13–39)
BASOPHILS # BLD AUTO: 0.01 THOUSANDS/ΜL (ref 0–0.1)
BASOPHILS NFR BLD AUTO: 1 % (ref 0–1)
BILIRUB SERPL-MCNC: 0.31 MG/DL (ref 0.2–1)
BUN SERPL-MCNC: 26 MG/DL (ref 5–25)
CALCIUM ALBUM COR SERPL-MCNC: 9.6 MG/DL (ref 8.3–10.1)
CALCIUM SERPL-MCNC: 8.6 MG/DL (ref 8.4–10.2)
CHLORIDE SERPL-SCNC: 105 MMOL/L (ref 96–108)
CO2 SERPL-SCNC: 30 MMOL/L (ref 21–32)
CREAT SERPL-MCNC: 0.9 MG/DL (ref 0.6–1.3)
EOSINOPHIL # BLD AUTO: 0.06 THOUSAND/ΜL (ref 0–0.61)
EOSINOPHIL # BLD AUTO: 0.08 THOUSAND/UL (ref 0–0.61)
EOSINOPHIL NFR BLD AUTO: 3 % (ref 0–6)
EOSINOPHIL NFR BLD MANUAL: 4 % (ref 0–6)
ERYTHROCYTE [DISTWIDTH] IN BLOOD BY AUTOMATED COUNT: 16.6 % (ref 11.6–15.1)
GFR SERPL CREATININE-BSD FRML MDRD: 65 ML/MIN/1.73SQ M
GLUCOSE SERPL-MCNC: 100 MG/DL (ref 65–140)
HAPTOGLOB SERPL-MCNC: 293 MG/DL (ref 37–355)
HCT VFR BLD AUTO: 27.7 % (ref 34.8–46.1)
HGB BLD-MCNC: 9.2 G/DL (ref 11.5–15.4)
IMM GRANULOCYTES # BLD AUTO: 0.01 THOUSAND/UL (ref 0–0.2)
IMM GRANULOCYTES NFR BLD AUTO: 1 % (ref 0–2)
IRON SATN MFR SERPL: 34 % (ref 15–50)
IRON SERPL-MCNC: 60 UG/DL (ref 50–212)
LYMPHOCYTES # BLD AUTO: 0.45 THOUSAND/UL (ref 0.6–4.47)
LYMPHOCYTES # BLD AUTO: 0.48 THOUSANDS/ΜL (ref 0.6–4.47)
LYMPHOCYTES # BLD AUTO: 18 %
LYMPHOCYTES NFR BLD AUTO: 24 % (ref 14–44)
MAGNESIUM SERPL-MCNC: 1.6 MG/DL (ref 1.9–2.7)
MCH RBC QN AUTO: 29 PG (ref 26.8–34.3)
MCHC RBC AUTO-ENTMCNC: 33.2 G/DL (ref 31.4–37.4)
MCV RBC AUTO: 87 FL (ref 82–98)
MONOCYTES # BLD AUTO: 0.16 THOUSAND/UL (ref 0–1.22)
MONOCYTES # BLD AUTO: 0.27 THOUSAND/ΜL (ref 0.17–1.22)
MONOCYTES NFR BLD AUTO: 14 % (ref 4–12)
MONOCYTES NFR BLD AUTO: 8 % (ref 4–12)
NEUTROPHILS # BLD AUTO: 1.14 THOUSANDS/ΜL (ref 1.85–7.62)
NEUTS BAND NFR BLD MANUAL: 3 % (ref 0–8)
NEUTS SEG # BLD: 1.28 THOUSAND/UL (ref 1.81–6.82)
NEUTS SEG NFR BLD AUTO: 57 % (ref 43–75)
NEUTS SEG NFR BLD AUTO: 62 %
NRBC BLD AUTO-RTO: 0 /100 WBCS
PLATELET # BLD AUTO: 180 THOUSANDS/UL (ref 149–390)
PLATELET BLD QL SMEAR: ADEQUATE
PMV BLD AUTO: 10.2 FL (ref 8.9–12.7)
POTASSIUM SERPL-SCNC: 4.1 MMOL/L (ref 3.5–5.3)
PROT SERPL-MCNC: 6.9 G/DL (ref 6.4–8.4)
RBC # BLD AUTO: 3.17 MILLION/UL (ref 3.81–5.12)
RBC MORPH BLD: PRESENT
SODIUM SERPL-SCNC: 143 MMOL/L (ref 135–147)
TIBC SERPL-MCNC: 177 UG/DL (ref 250–450)
TOTAL CELLS COUNTED SPEC: 100
UIBC SERPL-MCNC: 117 UG/DL (ref 155–355)
VARIANT LYMPHS # BLD AUTO: 5 % (ref 0–0)
WBC # BLD AUTO: 1.97 THOUSAND/UL (ref 4.31–10.16)

## 2024-11-02 PROCEDURE — 83735 ASSAY OF MAGNESIUM: CPT

## 2024-11-02 PROCEDURE — 99232 SBSQ HOSP IP/OBS MODERATE 35: CPT

## 2024-11-02 PROCEDURE — 83010 ASSAY OF HAPTOGLOBIN QUANT: CPT

## 2024-11-02 PROCEDURE — 80053 COMPREHEN METABOLIC PANEL: CPT

## 2024-11-02 PROCEDURE — 85025 COMPLETE CBC W/AUTO DIFF WBC: CPT

## 2024-11-02 PROCEDURE — 85007 BL SMEAR W/DIFF WBC COUNT: CPT

## 2024-11-02 PROCEDURE — 92610 EVALUATE SWALLOWING FUNCTION: CPT

## 2024-11-02 RX ORDER — MAGNESIUM SULFATE HEPTAHYDRATE 40 MG/ML
2 INJECTION, SOLUTION INTRAVENOUS ONCE
Status: COMPLETED | OUTPATIENT
Start: 2024-11-02 | End: 2024-11-02

## 2024-11-02 RX ADMIN — GABAPENTIN 300 MG: 250 SOLUTION ORAL at 08:41

## 2024-11-02 RX ADMIN — PANTOPRAZOLE SODIUM 40 MG: 40 INJECTION, POWDER, FOR SOLUTION INTRAVENOUS at 08:20

## 2024-11-02 RX ADMIN — ATORVASTATIN CALCIUM 40 MG: 40 TABLET, FILM COATED ORAL at 21:28

## 2024-11-02 RX ADMIN — CYANOCOBALAMIN TAB 500 MCG 1000 MCG: 500 TAB at 08:20

## 2024-11-02 RX ADMIN — FOLIC ACID 1 MG: 1 TABLET ORAL at 08:20

## 2024-11-02 RX ADMIN — AMLODIPINE BESYLATE 5 MG: 5 TABLET ORAL at 08:21

## 2024-11-02 RX ADMIN — GABAPENTIN 600 MG: 250 SOLUTION ORAL at 21:30

## 2024-11-02 RX ADMIN — MIRTAZAPINE 45 MG: 30 TABLET, FILM COATED ORAL at 21:28

## 2024-11-02 RX ADMIN — ACETAMINOPHEN 650 MG: 650 SUSPENSION ORAL at 17:28

## 2024-11-02 RX ADMIN — ENOXAPARIN SODIUM 40 MG: 40 INJECTION SUBCUTANEOUS at 08:20

## 2024-11-02 RX ADMIN — ACETAMINOPHEN 650 MG: 650 SUSPENSION ORAL at 05:21

## 2024-11-02 RX ADMIN — LOSARTAN POTASSIUM 50 MG: 50 TABLET, FILM COATED ORAL at 08:20

## 2024-11-02 RX ADMIN — MAGNESIUM SULFATE HEPTAHYDRATE 2 G: 40 INJECTION, SOLUTION INTRAVENOUS at 12:10

## 2024-11-02 RX ADMIN — POTASSIUM CHLORIDE 20 MEQ: 1.5 SOLUTION ORAL at 08:20

## 2024-11-02 RX ADMIN — DOCUSATE SODIUM LIQUID 50 MG: 50 LIQUID ORAL at 08:30

## 2024-11-02 RX ADMIN — POTASSIUM CHLORIDE 20 MEQ: 1.5 SOLUTION ORAL at 17:28

## 2024-11-02 RX ADMIN — ANASTROZOLE 1 MG: 1 TABLET, COATED ORAL at 08:40

## 2024-11-02 RX ADMIN — OXYCODONE HYDROCHLORIDE 5 MG: 5 TABLET ORAL at 17:28

## 2024-11-02 RX ADMIN — ACETAMINOPHEN 650 MG: 650 SUSPENSION ORAL at 12:10

## 2024-11-02 NOTE — QUICK NOTE
Went to patient bedside for evening rounds.  Patient was found to be asleep spoke with daughter briefly.  No questions or concerns at this time.  Spoke with nurse as well.  No questions or concerns at this time.  Overall, this is a 69-year-old female admitted for new onset fatigue in the setting of neutropenia.  Antibiotics were discontinued today.  Amlodipine 5 mg daily was added today as well.  Continue to monitor overnight.    Porsha Figueroa, DO  PGY-2  [facility]

## 2024-11-02 NOTE — PLAN OF CARE
Problem: INFECTION - ADULT  Goal: Absence or prevention of progression during hospitalization  Description: INTERVENTIONS:  - Assess and monitor for signs and symptoms of infection  - Monitor lab/diagnostic results  - Monitor all insertion sites, i.e. indwelling lines, tubes, and drains  - Monitor endotracheal if appropriate and nasal secretions for changes in amount and color  - [place] appropriate cooling/warming therapies per order  - Administer medications as ordered  - Instruct and encourage patient and family to use good hand hygiene technique  - Identify and instruct in appropriate isolation precautions for identified infection/condition  Outcome: Progressing     Problem: Nutrition/Hydration-ADULT  Goal: Nutrient/Hydration intake appropriate for improving, restoring or maintaining nutritional needs  Description: Monitor and assess patient's nutrition/hydration status for malnutrition. Collaborate with interdisciplinary team and initiate plan and interventions as ordered.  Monitor patient's weight and dietary intake as ordered or per policy. Utilize nutrition screening tool and intervene as necessary. Determine patient's food preferences and provide high-protein, high-caloric foods as appropriate.     INTERVENTIONS:  - Monitor oral intake, urinary output, labs, and treatment plans  - Assess nutrition and hydration status and recommend course of action  - Evaluate amount of meals eaten  - Assist patient with eating if necessary   - Allow adequate time for meals  - Recommend/ encourage appropriate diets, oral nutritional supplements, and vitamin/mineral supplements  - Order, calculate, and assess calorie counts as needed  - Recommend, monitor, and adjust tube feedings and TPN/PPN based on assessed needs  - Assess need for intravenous fluids  - Provide specific nutrition/hydration education as appropriate  - Include patient/family/caregiver in decisions related to nutrition  Outcome: Progressing

## 2024-11-02 NOTE — PROGRESS NOTES
Progress Note - Family Medicine   Name: Maira Moura 69 y.o. female I MRN: [id number]  Unit/Bed#: -01 I Date of Admission: 10/29/2024   Date of Service: 11/2/2024 I Hospital Day: 3     Assessment & Plan  Neoplastic malignant related fatigue  -69-year-old female with an extensive oncologic history who was a direct admit from oncologic office due to persistent new fatigue.  Of note, patient recently was started on cisplatin last month for her right tonsillar SCC.  -Brief oncologic history:   -Late 2018 patient was found to have invasive cancer of the right breast; status post partial mastectomy, radiation --has since been on anastrozole  -July 2023: SCC of right tonsil + colorectal cancer with metastasis to the liver.  Briefly, patient had initially presented in 3/2023 per chart review to PCP with complaints of sore throat.  Had imaging and ultimately biopsy done that was concerning for carcinoma.  Eventually got a PET/CT which showed multiple hypermetabolic lesions in chest, abdomen and pelvis.   -Colorectal cancer with mets to liver:   -Had a right hemicolectomy in 3/2024 due to partial obstruction.  Had a cryoablation of liver mets in 6/2024. Chemo.    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  Subsequent tumor board culminated in recommendations for reevaluation by ENT and radiation oncology, as well as definitive chemoradiation treatment.  -In the last 6 weeks, patient has had cisplatin; seventh week has been held by heme-onc due to poor tolerance  -10/2 had gastrostomy tube placed for nutritional support by IR  -10/29 presented to outpatient heme-onc for follow-up.  Has significant fatigue and pain compared to previous  "visits.  Also complained of choking sensation worsening in the last few weeks.  Subsequently recommended to be transferred for direct admit [facility].    Assessment: Worsening fatigue, mild tachycardia, along with chemo-induced neutropenia differentials include infection, dehydration, intolerance to chemo    Plan:  -Infectious workup initiated:   -BCx x 2     - No growth at 48h   -UA pending: not collected   -CXR negative; flu/COVID-negative   - Empirically placed on ceftriaxone/metronidazole starting 10/30/24; previously on zosyn for 2 doses    - will confirm with H/O for d/c on   -Lactic acid, procalcitonin on admission WNL  -Neutropenia precautions  -Closely monitor I/O  -Monitor WBC, VS, other S/S of infection  -Continue with G tube feeds as noted by outpatient  -Continue with home gabapentin; per chart review has been having bilateral leg paresthesias    -Consult nutrition   - Home tube feeds resumed   - Increase hydration while inpatient  -Consult heme-onc inpatient;   - Continue stabilization of patient, as of  heme/onc not requiring any further inpatient work up  -Consult ENT given worsening \" choking\" symptoms and dysphagia   - No inpatient interventions  -Consult palliative care   - pain medications adjusted for increased somnolence    - CM for outpatient resources   - PT/OT while inpatient  -Continue with anastrozol  Primary hypertension  Home meds: Losartan 50 mg  Systolic (24hrs), Av , Min:136 , Max:173   Diastolic (24hrs), Av, Min:90, Max:96    Uncontrolled    Plan:  -Continue with losartan 50 mg daily  - Add amlodipine 5 mg daily   Mixed hyperlipidemia  Home meds: Lipitor 40 mg  Continue home med   Malignant neoplasm of right female breast (HCC)  -Briefly: Diagnosed with invasive carcinoma of right breast, status post partial mastectomy and radiation  -Has since been on home anastrozole 1 mg daily    Plan:  -Continue with home anastrozole  GERD (gastroesophageal " "reflux disease)  Home medication: pantoprazole 40 mg daily    -Continue home medication  Metastatic colon cancer to liver (HCC)  -Briefly: Diagnosed in 7/2023 with colorectal cancer with mets to the liver  -Had a right hemicolectomy in 3/2024 due to partial obstruction.  Had a cryoablation of liver mets in 6/2024. Chemo.  -Per recent oncology OV note, CRC is at least locally advanced versus metastatic and is unresectable.   -Of note, interval PET/CT done in 8/2024 showed interval disease progression.  Consensus of follow-up tumor board was for patient to have reevaluation by ENT, radiation oncology, as well as definitive chemoradiation     Plan:  -See A&P under \"neoplastic malignant related fatigue\" and \"sensation choking\"   -Consults: Heme-onc, palliative care, nutrition   Right tonsillar squamous cell carcinoma (HCC)  -Briefly, right tonsillar SCC first diagnosed in 7/2023  -Of note, interval PET/CT done in 8/2024 showed interval disease progression.  Consensus of follow-up tumor board was for patient to have reevaluation by ENT, radiation oncology, as well as definitive chemoradiation  -Last few weeks completed 6 cycles of cisplatin; 7 cycles stopped in light of recent fatigue/choking sensation worsening  -10/2/2024: G-tube placement  -10/9/2024: Right tonsillectomy  -10/29/2024: CT neck soft tissue without contrast: Posttreatment changes as described without evidence for disease progression compared to PET/CT from 8/5/2024. No acute abnormality.     Plan:  -See A&P under \"neoplastic malignant related fatigue\" and \"sensation choking\"  -Consults: Heme-onc, ENT, palliative care, nutrition     - ENT with no further intervention  -Aspiration and airway precautions  -All feeds and meds through G-tube - will attempt to progress diet per patient's request  Chemotherapy induced neutropenia (HCC)  -WBC and ANC in the 1's    WCB & ANC     Results from last 7 days   Lab Units 11/02/24  0523 11/01/24  1507 10/31/24  0638 " "10/30/24  0434 10/29/24  1319 10/28/24  1112   WBC Thousand/uL 1.97* 1.86* 1.65* 1.76* 1.53* 1.58*   TOTAL NEUT ABS Thousands/µL 1.28*  1.14* 1.40*  --  1.16* 1.07*  --      Morning labs pending 11/1    Plan:  -Neutropenic precautions  -Continue to monitor WBC, temperature, VS, S/S of infection  Stage 3a chronic kidney disease (HCC)  Lab Results   Component Value Date    EGFR 65 11/02/2024    EGFR 60 11/01/2024    EGFR 60 10/31/2024    CREATININE 0.90 11/02/2024    CREATININE 0.97 11/01/2024    CREATININE 0.96 10/31/2024     - Maintain adequate hydration  - Avoid nephrotoxic medications  Sensation, choking  -C/o progressively worsening choking sensation for past few weeks, mainly when patient lying down or asleep  -Has not been able to take anything by mouth except small sips of water  -Also has had significant pain around throat  -In the setting of right SCC with recent right tonsillectomy, and recent 6 cycles of cisplatin with 7 cycle held given her symptoms of progressively working worsening \"choking sensation\" and worsening fatigue  -see A&P under \"neoplastic malignant related fatigue\" for more details  -10/29/2024: CT neck soft tissue without contrast: Posttreatment changes as described without evidence for disease progression compared to PET/CT from 8/5/2024. No acute abnormality.  - Passed speech and swallow test; patient complains of pain and  - ENT: no interventions inpatient   Assessment: Dysphagia, dependent on G-tube for nutrition and medication administration    Improving     Plan:  -Home meds to be administered via G-tube  -Continue with home tube feeds - will consult speech to assess progressing diet per patient's request  -Magic mouthwash as needed.  -Nutrition consulted, appreciate recommendations   - Resume home feeds via G tube  -Aspiration precautions, airway precautions  S/P percutaneous endoscopic gastrostomy (PEG) tube placement (HCC)  -Placed on 10/2/2024 for nutritional support  -Per chart " review:  -TF Goal: 2 containers (474 mL) 3 times daily of Jevity 1.5  -Water Flushin mL free water flush pre/post each bolus (360 mL water) + additional 150 mL water flushes 4 times daily (600 mL water).   -TF provides: 1422 mL total volume (6 cartons), 2133 kcal, 91g protein, 1081 mL free water, 2041 mL total fluid  -Prealbumin on admission low  - Passed speech and swallow test, patient complaint of dysphagia     Assessment: Dependent on PEG tube for nutrition and p.o. med administration due to dysphagia.    Plan:  -N.p.o., continue with home tube feeds and med administration via G-tube - will assess progressing diet per patient's request  -Continue with home vitamins  -Nutrition consulted: continue tube feeds, will increase hydration   -Aspiration/airway precautions  Pancytopenia due to chemotherapy (HCC)  Found to be pancytopenic on adm, with WBC 1.76, Plt 131, Hgb 7.6, in the setting of chemoradiation therapy.   Hgb 6.2 on 10/30 AM labs; 6.2 on repeat.   Transfused with irradiated pRBC 10/30, tolerated well.   Continue to monitor CBC.  Continue to monitor VS.      Admission Date: 10/29/2024  Length of Stay: 3 days  Code Status:  Level 1 - Full Code  Diet: Diet Enteral/Parenteral; Tube Feeding No Oral Diet; Jevity 1.5; Bolus; 474; (3x/day) - 8:00AM,12:00PM,5:00PM; 60; Before and After each Bolus  Consult:   IP CONSULT TO NUTRITION SERVICES  IP CONSULT TO HEMATOLOGY  IP CONSULT TO PALLIATIVE CARE  IP CONSULT TO ENT  IP CONSULT TO CASE MANAGEMENT        VTE Pharm PPX: Enoxaparin (Lovenox)  VTE [facility] PPX: sequential compression device and/or foot pump applied unless otherwise contraindicated    Subjective     Hospital Course & 24hr events:     Overnight/24hr events:  Overnight events: elevated blood pressure (up to 184/96). Concerns per nursing staff: none. Patient seen and examined. Patient is stating her throat is 5/10 pain and that she if feeling congested this morning. Overall she is feeling a little better  than at admission. She is willing to be assessed for furthering diet by speech.     Objective     Vitals:     Vitals:    11/01/24 2100 11/01/24 2241 11/02/24 0738 11/02/24 1129   BP:  158/96 159/95 160/94   Pulse: 96 82 84 93   Resp: 18      Temp:  99.6 °F (37.6 °C) 97.9 °F (36.6 °C)    TempSrc:       SpO2: 99% 96% 98% 98%     Temp:  [97.9 °F (36.6 °C)-99.6 °F (37.6 °C)] 97.9 °F (36.6 °C)  HR:  [] 93  BP: (136-173)/(90-96) 160/94  Resp:  [18] 18  SpO2:  [95 %-100 %] 98 %  O2 Device: None (Room air)  Weight (last 2 days)       None            Intake/Output Summary (Last 24 hours) at 11/2/2024 1204  Last data filed at 11/1/2024 1701  Gross per 24 hour   Intake 594 ml   Output --   Net 594 ml     Invasive Devices       Central Venous Catheter Line  Duration             Port A Cath 09/24/24 Right Chest 39 days              Drain  Duration             Gastrostomy/Enterostomy Percutaneous Interventional Gastrostomy 18 Fr. LUQ 31 days                      Labs:    I have personally reviewed pertinent reports.      Results from last 7 days   Lab Units 11/02/24  0523 11/01/24  1507 10/31/24  0638 10/31/24  0446 10/30/24  0749 10/30/24  0434 10/29/24  1954 10/29/24  1319 10/28/24  1112   WBC Thousand/uL 1.97* 1.86* 1.65*  --   --  1.76*  --  1.53* 1.58*   HEMOGLOBIN g/dL 9.2* 9.2* 8.7* 12.8 6.2* 6.2*  --  7.6* 7.0*   HEMATOCRIT % 27.7* 28.3* 25.9* 38.2 19.2* 19.1*  --  23.1* 21.7*   PLATELETS Thousands/uL 180 163 139*  --   --  131* 147* 133* 154   TOTAL NEUT ABS Thousands/µL 1.28*  1.14* 1.40*  --   --   --  1.16*  --  1.07*  --        Results from last 7 days   Lab Units 11/02/24  0523 11/01/24  1507 10/31/24  0638 10/30/24  0434 10/29/24  1319 10/28/24  1112   POTASSIUM mmol/L 4.1 3.8 4.0 3.8 4.0 3.6   CHLORIDE mmol/L 105 108 104 104 105 104   CO2 mmol/L 30 29 30 30 31 31   BUN mg/dL 26* 29* 25 26* 32* 31*   CREATININE mg/dL 0.90 0.97 0.96 1.04 1.00 1.10   CALCIUM mg/dL 8.6 8.3* 8.5 8.2* 8.9 8.6   AST U/L 24  --  32  23 21 28   ALT U/L 22  --  28 22 22 26   ALK PHOS U/L 107*  --  104 103 105* 91   EGFR ml/min/1.73sq m 65 60 60 55 58 51   MAGNESIUM mg/dL 1.6* 1.5* 1.7* 1.4*  --  1.3*   PHOSPHORUS mg/dL  --  2.8  --  3.3  --   --        Results from last 7 days   Lab Units 10/30/24  0434 10/29/24  1954 10/29/24  1319   LACTIC ACID mmol/L  --  1.3  --    PROCALCITONIN ng/ml 0.17  --  0.14       Lab Results   Component Value Date/Time    Blood Culture No Growth at 72 hrs. 10/29/2024 11:28 PM    Blood Culture No Growth at 72 hrs. 10/29/2024 07:54 PM         EKG, Pathology, Imaging, and Other Studies:   I have personally reviewed pertinent reports.      XR chest pa and lateral    Result Date: 10/30/2024  Impression: No acute cardiopulmonary disease. Workstation performed: [id number]     CT soft tissue neck with contrast    Result Date: 10/29/2024  Impression: Posttreatment changes as described without evidence for disease progression compared to PET/CT from 8/5/2024. No acute abnormality. Workstation performed: [id number]         Meds/Allergies     All medications and allergies reviewed  No Known Allergies    Continuous Infusions:       Scheduled Meds:  Current Facility-Administered Medications   Medication Dose Route Frequency Provider Last Rate    acetaminophen  650 mg Per G Tube Q6H RAFFY Newman,       amLODIPine  5 mg Per G Tube Daily Conor Herr MD      anastrozole  1 mg Per G Tube Daily Porsha Figueroa DO      atorvastatin  40 mg Per G Tube HS Porsha Figueroa DO      vitamin B-12  1,000 mcg Per G Tube Daily Porsha Figueroa DO      diphenhydramine, lidocaine, Al/Mg hydroxide, simethicone  10 mL Swish & Swallow Q4H PRN Conor Herr MD      docusate  50 mg Oral BID Porsha Figueroa DO      enoxaparin  40 mg Subcutaneous Daily Conor Herr MD      ergocalciferol  50,000 Units Oral Weekly Conor Herr MD      folic acid  1 mg Per G Tube Daily Porsha Figueroa DO      gabapentin  300 mg Per G Tube Daily Porsha Figueroa DO       gabapentin  600 mg Per G Tube HS Porsha Figueroa, DO      labetalol  10 mg Intravenous Q6H PRN Conor Herr MD      losartan  50 mg Per G Tube Daily Porsha Figueroa, DO      magnesium sulfate  2 g Intravenous Once Daisha Sanches MD      mirtazapine  45 mg Per G Tube HS Porsha Figueroa, DO      ondansetron  4 mg Per G Tube Q4H PRN Porsha Figueroa, DO      oxyCODONE  5 mg Oral Q4H PRN Ariadne Fontana MD      pantoprazole  40 mg Intravenous Q24H RAFFY Porsha Figueroa, DO      phenol  1 spray Mouth/Throat Q2H PRN Zia Loiaza MD      polyethylene glycol  17 g Per G Tube Daily Porsha Figueroa, DO      potassium chloride  20 mEq Per G Tube BID Porsha Figueroa, DO      pyridoxine  50 mg Per G Tube Daily Porsha Figueroa, DO         PRN Meds:    diphenhydramine, lidocaine, Al/Mg hydroxide, simethicone    labetalol    ondansetron    oxyCODONE    phenol      Physical Exam   Physical Exam  Constitutional:       Appearance: Normal appearance.   HENT:      Head: Normocephalic and atraumatic.   Eyes:      Extraocular Movements: Extraocular movements intact.      Pupils: Pupils are equal, round, and reactive to light.   Cardiovascular:      Rate and Rhythm: Normal rate and regular rhythm.      Heart sounds: Normal heart sounds.   Pulmonary:      Effort: Pulmonary effort is normal. No respiratory distress.      Breath sounds: No wheezing.      Comments: Upper airway congestion  Abdominal:      General: Abdomen is flat.      Palpations: Abdomen is soft.   Musculoskeletal:         General: Normal range of motion.      Cervical back: Normal range of motion.      Right lower leg: Edema present.      Left lower leg: Edema present.   Skin:     Findings: Lesion present.      Comments: Radiation associated lesion of the left lateral portion of neck   Neurological:      General: No focal deficit present.      Mental Status: She is alert and oriented to person, place, and time.   Psychiatric:         Mood and Affect: Mood normal.          Behavior: Behavior normal.              Zia Loaiza MD  PGY-1, B Family Medicine  11/02/24, 12:04 PM

## 2024-11-02 NOTE — SPEECH THERAPY NOTE
Speech Language/Pathology  Speech-Language Pathology Bedside Swallow Evaluation      Patient Name: Maira Moura    Today's Date: 11/2/2024     Problem List  Principal Problem:    Neoplastic malignant related fatigue  Active Problems:    Primary hypertension    Mixed hyperlipidemia    Malignant neoplasm of right female breast (HCC)    GERD (gastroesophageal reflux disease)    Metastatic colon cancer to liver (HCC)    Right tonsillar squamous cell carcinoma (HCC)    Chemotherapy induced neutropenia (HCC)    Stage 3a chronic kidney disease (HCC)    Sensation, choking    S/P percutaneous endoscopic gastrostomy (PEG) tube placement (HCC)    Pancytopenia due to chemotherapy (HCC)      Past Medical History  Past Medical History:   Diagnosis Date    Anemia     Arthritis     Body mass index (BMI) 40.0-44.9, adult (HCC)     Breast cancer (HCC) 12/17/2018    Cancer (HCC)     right breast, colon, liver, right tonsil    Cervical lymphadenopathy     CT neck on 3/30/2023- Large right level 2A lymphadenopathy as described above and suspicious for neoplasm.  Correlation with histopathology is recommended.  Mild asymmetric prominence of the right palatine tonsil with otherwise no definitive nodular enhancing lesions identified along the course of the aerodigestive tract.     5/26/23- FNA of this node was nonrevealing for tissue etiology, but it d    Encounter for screening for HIV 07/07/2022    Follow-up examination 04/04/2023    GERD (gastroesophageal reflux disease)     Hyperlipidemia     Hypertension     Obesity     Stroke (HCC)     TIA     Stroke (HCC)     TIA     Transaminitis 09/22/2021    Vasomotor rhinitis     Refilled flonase today. Stopped taking since January 2022       Past Surgical History  Past Surgical History:   Procedure Laterality Date    BREAST BIOPSY Right 11/23/2018    us guided bx cancer    BREAST SURGERY      COLONOSCOPY      ESOPHAGOSCOPY N/A 07/20/2023    Procedure: ESOPHAGOSCOPY;  Surgeon: David FORD  MD Sammi;  [facility];  Service: ENT    HEMICOLOECTOMY W/ ANASTOMOSIS Right 3/12/2024    Procedure: RIGHT COLECTOMY WITH ROBOTICS;  Surgeon: Vickie Israel MD;  Location: [facility];  Service: Surgical Oncology    IR BIOPSY LIVER MASS  2023    IR CRYOABLATION  6/3/2024    IR GASTROSTOMY TUBE PLACEMENT  10/2/2024    IR PORT CHECK  2024    IR PORT PLACEMENT  2023    IR PORT STRIPPING  2024    LAPAROTOMY N/A 3/12/2024    Procedure: LAPAROTOMY EXPLORATORY W/ ROBOTICS;  Surgeon: Vickie Israel MD;  [facility];  Service: Surgical Oncology    WV BIOPSY NASOPHARYNX VISIBLE LESION SIMPLE N/A 10/9/2024    Procedure: NASOPHARYNGOSCOPY with biospy;  Surgeon: Juanito Matthew MD;  [facility];  Service: ENT    WV BRNCHSC INCL FLUOR GDNCE DX W/CELL WASHG SPX N/A 2023    Procedure: BRONCHOSCOPY;  Surgeon: David Chapa MD;  [facility];  Service: ENT    WV LARYNGOSCOPY W/BIOPSY MICROSCOPE/TELESCOPE N/A 2023    Procedure: MICRODIRECT LARYNGOSCOPY WITH BIOPSY;  Surgeon: David Chapa MD;  [facility];  Service: ENT    WV LARYNGOSCOPY W/WO TRACHEOSCOPY DX EXCEPT  N/A 10/9/2024    Procedure: DIRECT LARYNGOSCOPY;  Surgeon: Juanito Matthew MD;  [facility];  Service: ENT    WV MASTECTOMY PARTIAL W/AXILLARY LYMPHADENECTOMY Right 2018    Procedure: ULTRASOUND LOCALIZED PARTIAL MASTECTOMY W/SENTINEL NODE BIOPSY POSS AXILLARY DISSECTION;  Surgeon: Zahida Coronado MD;  Location: [facility];  Service: General    WV TONSILLECTOMY PRIMARY/SECONDARY AGE 13/> N/A 10/9/2024    Procedure: TONSILLECTOMY;  Surgeon: Juanito Matthew MD;  [facility];  Service: ENT    TUBAL LIGATION      US GUIDED BREAST BIOPSY RIGHT COMPLETE Right 2018    US GUIDED INJECTION FOR RESEARCH STUDY  2018    US GUIDED INJECTION FOR RESEARCH STUDY  2018    US GUIDED INJECTION FOR RESEARCH STUDY  2019    US GUIDED  "LYMPH NODE BIOPSY RIGHT  05/26/2023       Summary   Pt presented with mild oropharyngeal dysphagia antonio by reduced transfers, labored at times and slow 2* possibly to pain, fairly prompt swallows and possibly reduced laryngeal elevation.  She has mild c/o pain in the throat w/ swallows but stated \"the water feels good\".  She feels able to begin a small amount of po at this time.  Would benefit from MBS in the future as able.   Risk/s for Aspiration: HNC/radiation     Recommended Diet: puree/level 1 diet and thin liquids -runny puree, extra sauce/gravy  Recommended Form of Meds: crushed with puree and or via PEG    Aspiration precautions and swallowing strategies: upright posture, slow rate of feeding, and alternating bites and sips  Other Recommendations: Continue frequent oral care, will follow        Current Medical Status  Pt is a 69 y.o. female who presented to [facility] with chronic pain related to cancer treatment.  She has additional pain w/ swallowing.    Noted pt s/p tonsillectomy & DL and nasopharyngeal biopsy. 10/24/24.  She was presented at Multidisciplinary Head and Neck Tumor Board with a consensus for re-evaluation by Otolaryngology for DL with biopsy of left tonsil and Radiation Oncology to consider XRT.         Current Precautions:  Fall  Aspiration  neutropenic    Allergies:  No known food allergies    Past medical history:  Please see H&P for details    Special Studies:  MBS 8/15/23  Summary:  Images are on PACS for review.   Oral stage: WNL/WFL Mastication was slightly prolonged but functional.  Patient wears upper dentures and has minimal frontal lower natural dentition.  Bolus control and formation were WNL.  Transfer was intermittently piecemeal.  Mild lingual residue.  Pharyngeal stage: WNL for prompt swallow, velar closure, hyoid excursion, laryngeal elevation, pharyngeal constriction, epiglottic inversion.  No/trace pharyngeal retention.  No penetration or aspiration.  Per gross " esophageal screen: Mild stasis following solids.  Thin liquids cleared residual.  Pill cleared adequately.  Pt rec'd regular with thin.         Social/Education/Vocational Hx:  Pt lives with family    Swallow Information   Current Risks for Dysphagia & Aspiration:  HNC, radiation  Current Symptoms/Concerns:  odynophagia, poor intake  Current Diet: NPO with tube feeds   Baseline Diet:  trying soft foods, thin liquids      Baseline Assessment   Behavior/Cognition: alert  Speech/Language Status: able to participate in basic conversation and able to follow commands  Patient Positioning: upright in bed  Pain Status/Interventions/Response to Interventions: in throat with po       Swallow Mechanism Exam  Facial: symmetrical  Labial: decreased strength  Lingual: decreased ROM and bilateral decreased strength  Velum: unable to visualize  Mandible: wfl   Dentition:  limited natural  Vocal quality: weak, low volume    Volitional Cough: strong/productive   Respiratory Status: on RA       Consistencies Assessed and Performance   Consistencies Administered: ice chips, thin liquids, and puree    Oral Stage: mild, decreased bolus propulsion, and delayed oral intiation  Adequate retrieval and containment, slow transfers    Pharyngeal Stage: suspected, mild, suspected decreased hyolaryngeal elevation upon palpation, suspected pharyngeal residue, multiple swallows, and painful swallowing  Mult swallows, grimaces, no coughing    Esophageal Concerns: none reported    Strategies and Efficacy: -    Summary and Recommendations (see above)    Results Reviewed with: patient, RN, MD, and family     Treatment Recommended: yes     Frequency of treatment: as able     Patient Stated Goal: get some water    Dysphagia LTG  -Patient will demonstrate safe and effective oral intake (without overt s/s significant oral/pharyngeal dysphagia including s/s penetration or aspiration) for the highest appropriate diet level.     Short Term Goals:  -Pt will  tolerate Dysphagia 1/pureed diet and  thin liquid with no significant s/s oral or pharyngeal dysphagia across 1-3 diagnostic session/s    -Patient will tolerate trials of upgraded food and/or liquid texture with no significant s/s of oral or pharyngeal dysphagia including aspiration across 1-3 diagnostic sessions     -Patient will comply with a Video/Modified Barium Swallow study for more complete assessment of swallowing anatomy/physiology/aspiration risk and to assess efficacy of treatment techniques so as to best guide treatment plan    Speech Therapy Prognosis   Prognosis: fair    Prognosis Considerations: medical status and prior medical history

## 2024-11-03 LAB
ANION GAP SERPL CALCULATED.3IONS-SCNC: 5 MMOL/L (ref 4–13)
BACTERIA BLD CULT: NORMAL
BUN SERPL-MCNC: 27 MG/DL (ref 5–25)
CALCIUM SERPL-MCNC: 8.3 MG/DL (ref 8.4–10.2)
CHLORIDE SERPL-SCNC: 106 MMOL/L (ref 96–108)
CO2 SERPL-SCNC: 31 MMOL/L (ref 21–32)
CREAT SERPL-MCNC: 0.9 MG/DL (ref 0.6–1.3)
ERYTHROCYTE [DISTWIDTH] IN BLOOD BY AUTOMATED COUNT: 16.9 % (ref 11.6–15.1)
GFR SERPL CREATININE-BSD FRML MDRD: 65 ML/MIN/1.73SQ M
GLUCOSE SERPL-MCNC: 97 MG/DL (ref 65–140)
HAPTOGLOB SERPL-MCNC: 281 MG/DL (ref 37–355)
HCT VFR BLD AUTO: 26.5 % (ref 34.8–46.1)
HGB BLD-MCNC: 8.7 G/DL (ref 11.5–15.4)
MAGNESIUM SERPL-MCNC: 1.7 MG/DL (ref 1.9–2.7)
MCH RBC QN AUTO: 28.9 PG (ref 26.8–34.3)
MCHC RBC AUTO-ENTMCNC: 32.8 G/DL (ref 31.4–37.4)
MCV RBC AUTO: 88 FL (ref 82–98)
PLATELET # BLD AUTO: 186 THOUSANDS/UL (ref 149–390)
PMV BLD AUTO: 9.6 FL (ref 8.9–12.7)
POTASSIUM SERPL-SCNC: 4.4 MMOL/L (ref 3.5–5.3)
RBC # BLD AUTO: 3.01 MILLION/UL (ref 3.81–5.12)
SODIUM SERPL-SCNC: 142 MMOL/L (ref 135–147)
WBC # BLD AUTO: 2.01 THOUSAND/UL (ref 4.31–10.16)

## 2024-11-03 PROCEDURE — 80048 BASIC METABOLIC PNL TOTAL CA: CPT

## 2024-11-03 PROCEDURE — 99232 SBSQ HOSP IP/OBS MODERATE 35: CPT

## 2024-11-03 PROCEDURE — 85027 COMPLETE CBC AUTOMATED: CPT

## 2024-11-03 PROCEDURE — 83735 ASSAY OF MAGNESIUM: CPT

## 2024-11-03 RX ORDER — LANOLIN ALCOHOL/MO/W.PET/CERES
400 CREAM (GRAM) TOPICAL 2 TIMES DAILY
Status: DISCONTINUED | OUTPATIENT
Start: 2024-11-03 | End: 2024-11-04 | Stop reason: HOSPADM

## 2024-11-03 RX ADMIN — ACETAMINOPHEN 650 MG: 650 SUSPENSION ORAL at 05:36

## 2024-11-03 RX ADMIN — ENOXAPARIN SODIUM 40 MG: 40 INJECTION SUBCUTANEOUS at 08:27

## 2024-11-03 RX ADMIN — LOSARTAN POTASSIUM 50 MG: 50 TABLET, FILM COATED ORAL at 08:27

## 2024-11-03 RX ADMIN — FOLIC ACID 1 MG: 1 TABLET ORAL at 08:27

## 2024-11-03 RX ADMIN — MAGNESIUM OXIDE TAB 400 MG (241.3 MG ELEMENTAL MG) 400 MG: 400 (241.3 MG) TAB at 08:27

## 2024-11-03 RX ADMIN — POTASSIUM CHLORIDE 20 MEQ: 1.5 SOLUTION ORAL at 08:27

## 2024-11-03 RX ADMIN — GABAPENTIN 300 MG: 250 SOLUTION ORAL at 08:28

## 2024-11-03 RX ADMIN — POTASSIUM CHLORIDE 20 MEQ: 1.5 SOLUTION ORAL at 17:21

## 2024-11-03 RX ADMIN — ACETAMINOPHEN 650 MG: 650 SUSPENSION ORAL at 12:24

## 2024-11-03 RX ADMIN — ACETAMINOPHEN 650 MG: 650 SUSPENSION ORAL at 17:21

## 2024-11-03 RX ADMIN — GABAPENTIN 600 MG: 250 SOLUTION ORAL at 21:15

## 2024-11-03 RX ADMIN — ATORVASTATIN CALCIUM 40 MG: 40 TABLET, FILM COATED ORAL at 21:15

## 2024-11-03 RX ADMIN — OXYCODONE HYDROCHLORIDE 5 MG: 5 TABLET ORAL at 17:21

## 2024-11-03 RX ADMIN — OXYCODONE HYDROCHLORIDE 5 MG: 5 TABLET ORAL at 12:24

## 2024-11-03 RX ADMIN — PANTOPRAZOLE SODIUM 40 MG: 40 INJECTION, POWDER, FOR SOLUTION INTRAVENOUS at 08:27

## 2024-11-03 RX ADMIN — AMLODIPINE BESYLATE 5 MG: 5 TABLET ORAL at 08:27

## 2024-11-03 RX ADMIN — ANASTROZOLE 1 MG: 1 TABLET, COATED ORAL at 08:27

## 2024-11-03 RX ADMIN — MIRTAZAPINE 45 MG: 30 TABLET, FILM COATED ORAL at 21:15

## 2024-11-03 RX ADMIN — MAGNESIUM OXIDE TAB 400 MG (241.3 MG ELEMENTAL MG) 400 MG: 400 (241.3 MG) TAB at 17:21

## 2024-11-03 RX ADMIN — CYANOCOBALAMIN TAB 500 MCG 1000 MCG: 500 TAB at 08:27

## 2024-11-03 RX ADMIN — PYRIDOXINE HCL TAB 50 MG 50 MG: 50 TAB at 08:28

## 2024-11-03 NOTE — ASSESSMENT & PLAN NOTE
"-C/o progressively worsening choking sensation for past few weeks, mainly when patient lying down or asleep  -Has not been able to take anything by mouth except small sips of water  -Also has had significant pain around throat  -In the setting of right SCC with recent right tonsillectomy, and recent 6 cycles of cisplatin with 7 cycle held given her symptoms of progressively working worsening \"choking sensation\" and worsening fatigue  -see A&P under \"neoplastic malignant related fatigue\" for more details  -10/29/2024: CT neck soft tissue without contrast: Posttreatment changes as described without evidence for disease progression compared to PET/CT from 8/5/2024. No acute abnormality.  - Passed speech and swallow test; patient complains of pain and  - ENT: no interventions inpatient   Assessment: Dysphagia, dependent on G-tube for nutrition and medication administration    Improving     Plan:  -Home meds to be administered via G-tube  -Continue with home tube feeds - will consult speech to assess progressing diet per patient's request  -Magic mouthwash as needed.  -Nutrition consulted, appreciate recommendations   - Resume home feeds via G tube  - Speech evaluation:    - passed, upgraded to pureed diet as tolerated   - patient still complains of odynophagia    - primary nutrition still continued with PEG tube   -Aspiration precautions, airway precautions  "

## 2024-11-03 NOTE — ASSESSMENT & PLAN NOTE
Home meds: Losartan 50 mg  Systolic (24hrs), Av , Min:136 , Max:154   Diastolic (24hrs), Av, Min:69, Max:99    Improved control     Plan:  -Continue with losartan 50 mg daily  - Added amlodipine 5 mg daily

## 2024-11-03 NOTE — ASSESSMENT & PLAN NOTE
-69-year-old female with an extensive oncologic history who was a direct admit from oncologic office due to persistent new fatigue.  Of note, patient recently was started on cisplatin last month for her right tonsillar SCC.  -Brief oncologic history:   -Late 2018 patient was found to have invasive cancer of the right breast; status post partial mastectomy, radiation --has since been on anastrozole  -July 2023: SCC of right tonsil + colorectal cancer with metastasis to the liver.  Briefly, patient had initially presented in 3/2023 per chart review to PCP with complaints of sore throat.  Had imaging and ultimately biopsy done that was concerning for carcinoma.  Eventually got a PET/CT which showed multiple hypermetabolic lesions in chest, abdomen and pelvis.   -Colorectal cancer with mets to liver:   -Had a right hemicolectomy in 3/2024 due to partial obstruction.  Had a cryoablation of liver mets in 6/2024. Chemo.    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  Subsequent tumor board culminated in recommendations for reevaluation by ENT and radiation oncology, as well as definitive chemoradiation treatment.  -In the last 6 weeks, patient has had cisplatin; seventh week has been held by heme-onc due to poor tolerance  -10/2 had gastrostomy tube placed for nutritional support by IR  -10/29 presented to outpatient heme-onc for follow-up.  Has significant fatigue and pain compared to previous visits.  Also complained of choking sensation worsening in the last few weeks.  Subsequently recommended to be transferred for direct admit to [facility] for further evaluation.    Assessment: Worsening fatigue, mild tachycardia, along with chemo-induced  "neutropenia differentials include infection, dehydration, intolerance to chemo    Overall improving     Plan:  -Infectious workup initiated:   -BCx x 2     - No growth at 72h   -UA pending: not collected prior to antibiotics start    -CXR negative; flu/COVID-negative   - Empirically placed on antibiotics: ceftriaxone/metronidazole starting 10/30/24; previously on zosyn for 2 doses    - discontinued all antibiotics on 11/1/24  -Lactic acid, procalcitonin on admission WNL  -Neutropenia precautions  -Closely monitor I/O  -Monitor WBC, VS, other S/S of infection  -Continue with G tube feeds as noted by outpatient  -Continue with home gabapentin; per chart review has been having bilateral leg paresthesias    -Consult nutrition   - Home tube feeds resumed   - Increase hydration while inpatient  -Consult heme-onc inpatient;   - Continue stabilization of patient, as of 11/2 heme/onc not requiring any further inpatient work up  -Consult ENT given worsening \" choking\" symptoms and dysphagia   - No inpatient interventions  -Consult palliative care   - pain medications adjusted for increased somnolence    - CM for outpatient resources   - PT/OT while inpatient  -Continue with anastrozol  "

## 2024-11-03 NOTE — ASSESSMENT & PLAN NOTE
-Placed on 10/2/2024 for nutritional support  -Per chart review:  -TF Goal: 2 containers (474 mL) 3 times daily of Jevity 1.5  -Water Flushin mL free water flush pre/post each bolus (360 mL water) + additional 150 mL water flushes 4 times daily (600 mL water).   -TF provides: 1422 mL total volume (6 cartons), 2133 kcal, 91g protein, 1081 mL free water, 2041 mL total fluid  -Prealbumin on admission low  - Passed speech and swallow test, patient complaint of dysphagia     Assessment: Dependent on PEG tube for nutrition and p.o. med administration due to dysphagia.    Plan:  -N.p.o., continue with home tube feeds and med administration via G-tube - will assess progressing diet per patient's request  -Continue with home vitamins  -Nutrition consulted: continue tube feeds with increase hydration   -Aspiration/airway precautions

## 2024-11-03 NOTE — PROGRESS NOTES
Progress Notes - Family Medicine Residency, Adamsrc Moura 1955, 69 y.o. female.  MRN: [id number]  Unit/Bed#: -01 Encounter: [id number]  Primary Care Provider: Fanta Turner MD      Admission Date: 10/29/2024 1204  Length of Stay: 4 days  Code Status:  Level 1 - Full Code  Disposition:   Consult:   IP CONSULT TO NUTRITION SERVICES  IP CONSULT TO HEMATOLOGY  IP CONSULT TO PALLIATIVE CARE  IP CONSULT TO ENT  IP CONSULT TO CASE MANAGEMENT         Assessment/Plan:      Plans discussed with [facility] team and finalization is pending attending physician attestation. Please, call [phone] for any clarification.     * Neoplastic malignant related fatigue  Assessment & Plan  -69-year-old female with an extensive oncologic history who was a direct admit from oncologic office due to persistent new fatigue.  Of note, patient recently was started on cisplatin last month for her right tonsillar SCC.  -Brief oncologic history:   -Late 2018 patient was found to have invasive cancer of the right breast; status post partial mastectomy, radiation --has since been on anastrozole  -July 2023: SCC of right tonsil + colorectal cancer with metastasis to the liver.  Briefly, patient had initially presented in 3/2023 per chart review to PCP with complaints of sore throat.  Had imaging and ultimately biopsy done that was concerning for carcinoma.  Eventually got a PET/CT which showed multiple hypermetabolic lesions in chest, abdomen and pelvis.   -Colorectal cancer with mets to liver:   -Had a right hemicolectomy in 3/2024 due to partial obstruction.  Had a cryoablation of liver mets in 6/2024. Chemo.    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  Subsequent tumor board culminated in recommendations for reevaluation by ENT and radiation oncology, as well as definitive chemoradiation treatment.  -In the last 6 weeks, patient has had cisplatin; seventh week has been held by heme-onc due to poor tolerance  -10/2 had gastrostomy tube placed for nutritional support by IR  -10/29 presented to outpatient heme-onc for follow-up.  Has significant fatigue and pain compared to previous visits.  Also complained of choking sensation worsening in the last few weeks.  Subsequently recommended to be transferred for direct admit to [facility] for further evaluation.    Assessment: Worsening fatigue, mild tachycardia, along with chemo-induced neutropenia differentials include infection, dehydration, intolerance to chemo    Overall improving     Plan:  -Infectious workup initiated:   -BCx x 2     - No growth at 72h   -UA pending: not collected prior to antibiotics start    -CXR negative; flu/COVID-negative   - Empirically placed on antibiotics: ceftriaxone/metronidazole starting 10/30/24; previously on zosyn for 2 doses    - discontinued all antibiotics on 11/1/24  -Lactic acid, procalcitonin on admission WNL  -Neutropenia precautions  -Closely monitor I/O  -Monitor WBC, VS, other S/S of infection  -Continue with G tube feeds as noted by outpatient  -Continue with home gabapentin; per chart review has been having bilateral leg paresthesias    -Consult nutrition   - Home tube feeds resumed   - Increase hydration while inpatient  -Consult heme-onc inpatient;   - Continue stabilization of patient, as of 11/2 heme/onc not requiring any further inpatient work up  -Consult ENT given worsening \" choking\" symptoms and dysphagia   - No inpatient interventions  -Consult palliative care   - pain medications adjusted for increased somnolence    - CM for outpatient resources   - PT/OT while inpatient  -Continue with anastrozol    Sensation, choking  Assessment & Plan  -C/o " "progressively worsening choking sensation for past few weeks, mainly when patient lying down or asleep  -Has not been able to take anything by mouth except small sips of water  -Also has had significant pain around throat  -In the setting of right SCC with recent right tonsillectomy, and recent 6 cycles of cisplatin with 7 cycle held given her symptoms of progressively working worsening \"choking sensation\" and worsening fatigue  -see A&P under \"neoplastic malignant related fatigue\" for more details  -10/29/2024: CT neck soft tissue without contrast: Posttreatment changes as described without evidence for disease progression compared to PET/CT from 8/5/2024. No acute abnormality.  - Passed speech and swallow test; patient complains of pain and  - ENT: no interventions inpatient   Assessment: Dysphagia, dependent on G-tube for nutrition and medication administration    Improving     Plan:  -Home meds to be administered via G-tube  -Continue with home tube feeds - will consult speech to assess progressing diet per patient's request  -Magic mouthwash as needed.  -Nutrition consulted, appreciate recommendations   - Resume home feeds via G tube  - Speech evaluation:    - passed, upgraded to pureed diet as tolerated   - patient still complains of odynophagia   -Aspiration precautions, airway precautions    Right tonsillar squamous cell carcinoma (HCC)  Assessment & Plan  -Briefly, right tonsillar SCC first diagnosed in 7/2023  -Of note, interval PET/CT done in 8/2024 showed interval disease progression.  Consensus of follow-up tumor board was for patient to have reevaluation by ENT, radiation oncology, as well as definitive chemoradiation  -Last few weeks completed 6 cycles of cisplatin; 7 cycles stopped in light of recent fatigue/choking sensation worsening  -10/2/2024: G-tube placement  -10/9/2024: Right tonsillectomy  -10/29/2024: CT neck soft tissue without contrast: Posttreatment changes as described without evidence " "for disease progression compared to PET/CT from 2024. No acute abnormality.     Plan:  -See A&P under \"neoplastic malignant related fatigue\" and \"sensation choking\"  -Consults: Heme-onc, ENT, palliative care, nutrition     - ENT with no further intervention  -Aspiration and airway precautions  -All feeds and meds through G-tube -    - upgraded to pureed foods per speech, odynophagia per patient     Malignant neoplasm of right female breast (HCC)  Assessment & Plan  -Briefly: Diagnosed with invasive carcinoma of right breast, status post partial mastectomy and radiation  -Has since been on home anastrozole 1 mg daily    Plan:  -Continue with home anastrozole     Pancytopenia due to chemotherapy (HCC)  Assessment & Plan  Found to be pancytopenic on adm, with WBC 1.76, Plt 131, Hgb 7.6, in the setting of chemoradiation therapy.   Hgb 6.2 on 10 AM labs; 6.2 on repeat.   Transfused with irradiated pRBC 10/30, tolerated well.   Continue to monitor CBC.  Continue to monitor VS.     S/P percutaneous endoscopic gastrostomy (PEG) tube placement (HCC)  Assessment & Plan  -Placed on 10/2/2024 for nutritional support  -Per chart review:  -TF Goal: 2 containers (474 mL) 3 times daily of Jevity 1.5  -Water Flushin mL free water flush pre/post each bolus (360 mL water) + additional 150 mL water flushes 4 times daily (600 mL water).   -TF provides: 1422 mL total volume (6 cartons), 2133 kcal, 91g protein, 1081 mL free water, 2041 mL total fluid  -Prealbumin on admission low  - Passed speech and swallow test, patient complaint of dysphagia     Assessment: Dependent on PEG tube for nutrition and p.o. med administration due to dysphagia.    Plan:  -N.p.o., continue with home tube feeds and med administration via G-tube - will assess progressing diet per patient's request  -Continue with home vitamins  -Nutrition consulted: continue tube feeds with increase hydration   -Aspiration/airway precautions    Stage 3a chronic " "kidney disease (HCC)  Assessment & Plan  Lab Results   Component Value Date    EGFR 65 2024    EGFR 65 2024    EGFR 60 2024    CREATININE 0.90 2024    CREATININE 0.90 2024    CREATININE 0.97 2024     - Maintain adequate hydration  - Avoid nephrotoxic medications    Chemotherapy induced neutropenia (HCC)  Assessment & Plan  -WBC and ANC in the 1's    WCB & ANC     Results from last 7 days   Lab Units 24  0749 24  0523 24  1507 10/31/24  0638 10/30/24  0434 10/29/24  1319 10/28/24  1112   WBC Thousand/uL 2.01* 1.97* 1.86* 1.65* 1.76* 1.53* 1.58*   TOTAL NEUT ABS Thousands/µL  --  1.28*  1.14* 1.40*  --  1.16* 1.07*  --      Improving    Plan:  -Neutropenic precautions  -Continue to monitor WBC, temperature, VS, S/S of infection    Metastatic colon cancer to liver (HCC)  Assessment & Plan  -Briefly: Diagnosed in 2023 with colorectal cancer with mets to the liver  -Had a right hemicolectomy in 3/2024 due to partial obstruction.  Had a cryoablation of liver mets in 2024. Chemo.  -Per recent oncology OV note, CRC is at least locally advanced versus metastatic and is unresectable.   -Of note, interval PET/CT done in 2024 showed interval disease progression.  Consensus of follow-up tumor board was for patient to have reevaluation by ENT, radiation oncology, as well as definitive chemoradiation     Plan:  -See A&P under \"neoplastic malignant related fatigue\" and \"sensation choking\"   -Consults: Heme-onc, palliative care, nutrition      GERD (gastroesophageal reflux disease)  Assessment & Plan  Home medication: pantoprazole 40 mg daily     -Continue home medication    Mixed hyperlipidemia  Assessment & Plan  Home meds: Lipitor 40 mg  Continue home med      Primary hypertension  Assessment & Plan  Home meds: Losartan 50 mg  Systolic (24hrs), Av , Min:135 , Max:152   Diastolic (24hrs), Av, Min:69, Max:83    Improved control     Plan:  -Continue with losartan " 50 mg daily  - Add amlodipine 5 mg daily           Diet: Diet Enteral/Parenteral; Tube Feeding with Oral Diet; Jevity 1.5; Bolus; 474; (3x/day) - 8:00AM,12:00PM,5:00PM; 60; Before and After each Bolus; Dysphagia/Modified Consistency; Dysphagia 1-Pureed; Thin Liquid    VTE Pharm PPX: Enoxaparin (Lovenox)  [facility]      Subjective:   69 y.o. female admitted 10/29/2024 for fatigue s/p cancer, odynophagia, on PEG tube    Today 11/03/24, HD# 4    Per handoff, patient without acute events overnight.   Per nursing staff, no concern or report.   Patient seen and examined at bedside and without questions or concerns. Patient denies CP, SOB, abdominal pain, N/V, headaches, dizziness, lightheadedness, changes in bowel movements, urinary symptoms at this time.    Medical management and treatment was discussed with patient, patient understands and agrees with plan.     Objective:     Vitals:    11/02/24 1511 11/02/24 2215 11/03/24 0729 11/03/24 1518   BP: 137/78 135/76 152/83 136/69   Pulse: 94  94 85   Resp:   18 18   Temp: 98 °F (36.7 °C) 99.3 °F (37.4 °C) 97.9 °F (36.6 °C) 97.8 °F (36.6 °C)   TempSrc:       SpO2: 97%  97% 97%     Temp:  [97.8 °F (36.6 °C)-99.3 °F (37.4 °C)] 97.8 °F (36.6 °C)  HR:  [85-94] 85  BP: (135-152)/(69-83) 136/69  Resp:  [18] 18  SpO2:  [97 %] 97 %  Weight (last 2 days)       None            Intake/Output Summary (Last 24 hours) at 11/3/2024 1557  Last data filed at 11/3/2024 1301  Gross per 24 hour   Intake 948 ml   Output --   Net 948 ml     Invasive Devices       Central Venous Catheter Line  Duration             Port A Cath 09/24/24 Right Chest 40 days              Drain  Duration             Gastrostomy/Enterostomy Percutaneous Interventional Gastrostomy 18 Fr. LUQ 32 days                      Physical Exam:     Physical Exam  Vitals and nursing note reviewed.   Constitutional:       General: She is not in acute distress.     Appearance: She is well-developed.  She is obese. She is not ill-appearing.   HENT:      Head: Normocephalic and atraumatic.      Right Ear: External ear normal.      Left Ear: External ear normal.      Nose: Nose normal.      Mouth/Throat:      Mouth: Mucous membranes are moist.      Pharynx: Oropharynx is clear.      Comments: Increased saliva, painful swallowing  Eyes:      Conjunctiva/sclera: Conjunctivae normal.   Cardiovascular:      Rate and Rhythm: Normal rate and regular rhythm.      Pulses: Normal pulses.      Heart sounds: Normal heart sounds. No murmur heard.  Pulmonary:      Effort: Pulmonary effort is normal. No respiratory distress.      Breath sounds: Normal breath sounds.   Abdominal:      General: Abdomen is flat. Bowel sounds are normal.      Palpations: Abdomen is soft.      Tenderness: There is no abdominal tenderness. There is no right CVA tenderness or left CVA tenderness.      Comments: PEG tube    Musculoskeletal:         General: No swelling.      Cervical back: Neck supple.      Right lower leg: No edema.      Left lower leg: No edema.   Skin:     General: Skin is warm and dry.      Capillary Refill: Capillary refill takes less than 2 seconds.   Neurological:      General: No focal deficit present.      Mental Status: She is alert and oriented to person, place, and time. Mental status is at baseline.   Psychiatric:         Mood and Affect: Mood normal.           Labs:     CBC:  Results from last 7 days   Lab Units 11/03/24  0749 11/02/24  0523 11/01/24  1507 10/31/24  0638 10/31/24  0446 10/30/24  0749 10/30/24  0434 10/29/24  1954 10/29/24  1319 10/28/24  1112   WBC Thousand/uL 2.01* 1.97* 1.86* 1.65*  --   --  1.76*  --  1.53* 1.58*   HEMOGLOBIN g/dL 8.7* 9.2* 9.2* 8.7* 12.8 6.2* 6.2*  --  7.6* 7.0*   HEMATOCRIT % 26.5* 27.7* 28.3* 25.9* 38.2 19.2* 19.1*  --  23.1* 21.7*   PLATELETS Thousands/uL 186 180 163 139*  --   --  131* 147* 133* 154   TOTAL NEUT ABS Thousands/µL  --  1.28*  1.14* 1.40*  --   --   --  1.16*  --   1.07*  --        CMP:  Results from last 7 days   Lab Units 11/03/24  0749 11/02/24  0523 11/01/24  1507 10/31/24  0638 10/30/24  0434 10/29/24  1319 10/28/24  1112   POTASSIUM mmol/L 4.4 4.1 3.8 4.0 3.8 4.0 3.6   CHLORIDE mmol/L 106 105 108 104 104 105 104   CO2 mmol/L 31 30 29 30 30 31 31   BUN mg/dL 27* 26* 29* 25 26* 32* 31*   CREATININE mg/dL 0.90 0.90 0.97 0.96 1.04 1.00 1.10   CALCIUM mg/dL 8.3* 8.6 8.3* 8.5 8.2* 8.9 8.6   AST U/L  --  24  --  32 23 21 28   ALT U/L  --  22  --  28 22 22 26   ALK PHOS U/L  --  107*  --  104 103 105* 91   EGFR ml/min/1.73sq m 65 65 60 60 55 58 51   MAGNESIUM mg/dL 1.7* 1.6* 1.5* 1.7* 1.4*  --  1.3*   PHOSPHORUS mg/dL  --   --  2.8  --  3.3  --   --        Sepsis:  Results from last 7 days   Lab Units 10/30/24  0434 10/29/24  1954 10/29/24  1319   LACTIC ACID mmol/L  --  1.3  --    PROCALCITONIN ng/ml 0.17  --  0.14       Micro:  Lab Results   Component Value Date/Time    Blood Culture No Growth After 4 Days. 10/29/2024 11:28 PM    Blood Culture No Growth After 4 Days. 10/29/2024 07:54 PM         Imaging:     XR chest pa and lateral    Result Date: 10/30/2024  Impression: No acute cardiopulmonary disease. Workstation performed: [id number]     CT soft tissue neck with contrast    Result Date: 10/29/2024  Impression: Posttreatment changes as described without evidence for disease progression compared to PET/CT from 8/5/2024. No acute abnormality. Workstation performed: [id number]         Medications:     Current Facility-Administered Medications:     acetaminophen (TYLENOL) oral suspension 650 mg, Q6H RAFFY    amLODIPine (NORVASC) tablet 5 mg, Daily    anastrozole (ARIMIDEX) tablet 1 mg, Daily    atorvastatin (LIPITOR) tablet 40 mg, HS    cyanocobalamin (VITAMIN B-12) tablet 1,000 mcg, Daily    diphenhydramine, lidocaine, Al/Mg hydroxide, simethicone (Magic Mouthwash) oral solution 10 mL, Q4H PRN    docusate (COLACE) oral liquid 50 mg, BID    enoxaparin (LOVENOX) subcutaneous  injection 40 mg, Daily    ergocalciferol (VITAMIN D2) capsule 50,000 Units, Weekly    folic acid (FOLVITE) tablet 1 mg, Daily    gabapentin (NEURONTIN) oral solution 300 mg, Daily    gabapentin (NEURONTIN) oral solution 600 mg, HS    labetalol (NORMODYNE) injection 10 mg, Q6H PRN    losartan (COZAAR) tablet 50 mg, Daily    magnesium Oxide (MAG-OX) tablet 400 mg, BID    mirtazapine (REMERON) tablet 45 mg, HS    ondansetron (ZOFRAN) oral solution 4 mg, Q4H PRN    oxyCODONE (ROXICODONE) IR tablet 5 mg, Q4H PRN    pantoprazole (PROTONIX) injection 40 mg, Q24H RAFFY    phenol (CHLORASEPTIC) 1.4 % mucosal liquid 1 spray, Q2H PRN    polyethylene glycol (MIRALAX) packet 17 g, Daily    potassium chloride oral solution 20 mEq, BID    pyridoxine (VITAMIN B6) tablet 50 mg, Daily      Conor Herr MD  Family Medicine, PGY-3  3:57 PM 11/3/2024

## 2024-11-03 NOTE — ASSESSMENT & PLAN NOTE
"-Briefly, right tonsillar SCC first diagnosed in 7/2023  -Of note, interval PET/CT done in 8/2024 showed interval disease progression.  Consensus of follow-up tumor board was for patient to have reevaluation by ENT, radiation oncology, as well as definitive chemoradiation  -Last few weeks completed 6 cycles of cisplatin; 7 cycles stopped in light of recent fatigue/choking sensation worsening  -10/2/2024: G-tube placement  -10/9/2024: Right tonsillectomy  -10/29/2024: CT neck soft tissue without contrast: Posttreatment changes as described without evidence for disease progression compared to PET/CT from 8/5/2024. No acute abnormality.     Plan:  -See A&P under \"neoplastic malignant related fatigue\" and \"sensation choking\"  -Consults: Heme-onc, ENT, palliative care, nutrition     - ENT with no further intervention  -Aspiration and airway precautions  -All feeds and meds through G-tube -    - upgraded to pureed foods per speech, odynophagia per patient continues  "

## 2024-11-03 NOTE — ASSESSMENT & PLAN NOTE
-WBC and ANC in the 1's    WCB & ANC     Results from last 7 days   Lab Units 11/04/24  0516 11/03/24  0749 11/02/24  0523 11/01/24  1507 10/31/24  0638 10/30/24  0434 10/29/24  1319   WBC Thousand/uL 2.02* 2.01* 1.97* 1.86* 1.65* 1.76* 1.53*   TOTAL NEUT ABS Thousands/µL 1.15*  --  1.28*  1.14* 1.40*  --  1.16* 1.07*     Improving    Plan:  -Neutropenic precautions  -Continue to monitor WBC, temperature, VS, S/S of infection

## 2024-11-03 NOTE — ASSESSMENT & PLAN NOTE
Lab Results   Component Value Date    EGFR 64 11/04/2024    EGFR 65 11/03/2024    EGFR 65 11/02/2024    CREATININE 0.92 11/04/2024    CREATININE 0.90 11/03/2024    CREATININE 0.90 11/02/2024     - Maintain adequate hydration  - Avoid nephrotoxic medications

## 2024-11-04 ENCOUNTER — DOCUMENTATION (OUTPATIENT)
Dept: HEMATOLOGY ONCOLOGY | Facility: CLINIC | Age: 69
End: 2024-11-04

## 2024-11-04 ENCOUNTER — APPOINTMENT (OUTPATIENT)
Dept: RADIATION ONCOLOGY | Facility: CLINIC | Age: 69
End: 2024-11-04
Attending: RADIOLOGY
Payer: MEDICARE

## 2024-11-04 ENCOUNTER — HOSPITAL ENCOUNTER (OUTPATIENT)
Dept: INFUSION CENTER | Facility: CLINIC | Age: 69
Discharge: HOME | End: 2024-11-04

## 2024-11-04 VITALS
TEMPERATURE: 98.9 F | DIASTOLIC BLOOD PRESSURE: 70 MMHG | SYSTOLIC BLOOD PRESSURE: 130 MMHG | HEART RATE: 80 BPM | OXYGEN SATURATION: 96 % | RESPIRATION RATE: 18 BRPM

## 2024-11-04 PROBLEM — Z51.5 PALLIATIVE CARE ENCOUNTER: Status: ACTIVE | Noted: 2024-03-15

## 2024-11-04 LAB
ALBUMIN SERPL BCG-MCNC: 2.8 G/DL (ref 3.5–5)
ALP SERPL-CCNC: 107 U/L (ref 34–104)
ALT SERPL W P-5'-P-CCNC: 18 U/L (ref 7–52)
ANION GAP SERPL CALCULATED.3IONS-SCNC: 6 MMOL/L (ref 4–13)
AST SERPL W P-5'-P-CCNC: 22 U/L (ref 13–39)
BACTERIA BLD CULT: NORMAL
BASOPHILS # BLD AUTO: 0 THOUSANDS/ΜL (ref 0–0.1)
BASOPHILS NFR BLD AUTO: 0 % (ref 0–1)
BILIRUB SERPL-MCNC: 0.26 MG/DL (ref 0.2–1)
BUN SERPL-MCNC: 27 MG/DL (ref 5–25)
CALCIUM ALBUM COR SERPL-MCNC: 9.6 MG/DL (ref 8.3–10.1)
CALCIUM SERPL-MCNC: 8.6 MG/DL (ref 8.4–10.2)
CHLORIDE SERPL-SCNC: 104 MMOL/L (ref 96–108)
CO2 SERPL-SCNC: 31 MMOL/L (ref 21–32)
CREAT SERPL-MCNC: 0.92 MG/DL (ref 0.6–1.3)
EOSINOPHIL # BLD AUTO: 0.06 THOUSAND/ΜL (ref 0–0.61)
EOSINOPHIL NFR BLD AUTO: 3 % (ref 0–6)
ERYTHROCYTE [DISTWIDTH] IN BLOOD BY AUTOMATED COUNT: 17 % (ref 11.6–15.1)
GFR SERPL CREATININE-BSD FRML MDRD: 64 ML/MIN/1.73SQ M
GLUCOSE SERPL-MCNC: 96 MG/DL (ref 65–140)
HCT VFR BLD AUTO: 27.1 % (ref 34.8–46.1)
HGB BLD-MCNC: 8.7 G/DL (ref 11.5–15.4)
IMM GRANULOCYTES # BLD AUTO: 0.01 THOUSAND/UL (ref 0–0.2)
IMM GRANULOCYTES NFR BLD AUTO: 1 % (ref 0–2)
LYMPHOCYTES # BLD AUTO: 0.4 THOUSANDS/ΜL (ref 0.6–4.47)
LYMPHOCYTES NFR BLD AUTO: 20 % (ref 14–44)
MAGNESIUM SERPL-MCNC: 1.5 MG/DL (ref 1.9–2.7)
MCH RBC QN AUTO: 28.7 PG (ref 26.8–34.3)
MCHC RBC AUTO-ENTMCNC: 32.1 G/DL (ref 31.4–37.4)
MCV RBC AUTO: 89 FL (ref 82–98)
MONOCYTES # BLD AUTO: 0.4 THOUSAND/ΜL (ref 0.17–1.22)
MONOCYTES NFR BLD AUTO: 20 % (ref 4–12)
NEUTROPHILS # BLD AUTO: 1.15 THOUSANDS/ΜL (ref 1.85–7.62)
NEUTS SEG NFR BLD AUTO: 56 % (ref 43–75)
NRBC BLD AUTO-RTO: 0 /100 WBCS
PLATELET # BLD AUTO: 210 THOUSANDS/UL (ref 149–390)
PMV BLD AUTO: 10 FL (ref 8.9–12.7)
POTASSIUM SERPL-SCNC: 4.7 MMOL/L (ref 3.5–5.3)
PROT SERPL-MCNC: 6.5 G/DL (ref 6.4–8.4)
RBC # BLD AUTO: 3.03 MILLION/UL (ref 3.81–5.12)
SODIUM SERPL-SCNC: 141 MMOL/L (ref 135–147)
WBC # BLD AUTO: 2.02 THOUSAND/UL (ref 4.31–10.16)

## 2024-11-04 PROCEDURE — 99233 SBSQ HOSP IP/OBS HIGH 50: CPT | Performed by: INTERNAL MEDICINE

## 2024-11-04 PROCEDURE — 99238 HOSP IP/OBS DSCHRG MGMT 30/<: CPT

## 2024-11-04 PROCEDURE — 80053 COMPREHEN METABOLIC PANEL: CPT

## 2024-11-04 PROCEDURE — 83735 ASSAY OF MAGNESIUM: CPT

## 2024-11-04 PROCEDURE — 85025 COMPLETE CBC W/AUTO DIFF WBC: CPT

## 2024-11-04 RX ORDER — LANOLIN ALCOHOL/MO/W.PET/CERES
400 CREAM (GRAM) TOPICAL 2 TIMES DAILY
Qty: 60 TABLET | Refills: 0 | Status: SHIPPED | OUTPATIENT
Start: 2024-11-04

## 2024-11-04 RX ORDER — AMLODIPINE BESYLATE 5 MG/1
5 TABLET ORAL DAILY
Qty: 30 TABLET | Refills: 0 | Status: SHIPPED | OUTPATIENT
Start: 2024-11-05

## 2024-11-04 RX ORDER — MAGNESIUM SULFATE HEPTAHYDRATE 40 MG/ML
4 INJECTION, SOLUTION INTRAVENOUS ONCE
Status: COMPLETED | OUTPATIENT
Start: 2024-11-04 | End: 2024-11-04

## 2024-11-04 RX ORDER — POTASSIUM CHLORIDE 20MEQ/15ML
20 LIQUID (ML) ORAL 2 TIMES DAILY
Qty: 473 ML | Refills: 0 | Status: SHIPPED | OUTPATIENT
Start: 2024-11-04

## 2024-11-04 RX ADMIN — OXYCODONE HYDROCHLORIDE 5 MG: 5 TABLET ORAL at 02:32

## 2024-11-04 RX ADMIN — MAGNESIUM SULFATE HEPTAHYDRATE 4 G: 40 INJECTION, SOLUTION INTRAVENOUS at 08:26

## 2024-11-04 RX ADMIN — ACETAMINOPHEN 650 MG: 650 SUSPENSION ORAL at 13:52

## 2024-11-04 RX ADMIN — FOLIC ACID 1 MG: 1 TABLET ORAL at 08:25

## 2024-11-04 RX ADMIN — CYANOCOBALAMIN TAB 500 MCG 1000 MCG: 500 TAB at 08:25

## 2024-11-04 RX ADMIN — ANASTROZOLE 1 MG: 1 TABLET, COATED ORAL at 08:26

## 2024-11-04 RX ADMIN — AMLODIPINE BESYLATE 5 MG: 5 TABLET ORAL at 08:25

## 2024-11-04 RX ADMIN — LOSARTAN POTASSIUM 50 MG: 50 TABLET, FILM COATED ORAL at 08:25

## 2024-11-04 RX ADMIN — ACETAMINOPHEN 650 MG: 650 SUSPENSION ORAL at 05:16

## 2024-11-04 RX ADMIN — ENOXAPARIN SODIUM 40 MG: 40 INJECTION SUBCUTANEOUS at 08:25

## 2024-11-04 RX ADMIN — MAGNESIUM OXIDE TAB 400 MG (241.3 MG ELEMENTAL MG) 400 MG: 400 (241.3 MG) TAB at 08:25

## 2024-11-04 RX ADMIN — GABAPENTIN 300 MG: 250 SOLUTION ORAL at 08:28

## 2024-11-04 RX ADMIN — OXYCODONE HYDROCHLORIDE 5 MG: 5 TABLET ORAL at 13:53

## 2024-11-04 RX ADMIN — POTASSIUM CHLORIDE 20 MEQ: 1.5 SOLUTION ORAL at 08:28

## 2024-11-04 RX ADMIN — PYRIDOXINE HCL TAB 50 MG 50 MG: 50 TAB at 08:29

## 2024-11-04 RX ADMIN — OXYCODONE HYDROCHLORIDE 5 MG: 5 TABLET ORAL at 08:25

## 2024-11-04 RX ADMIN — PANTOPRAZOLE SODIUM 40 MG: 40 INJECTION, POWDER, FOR SOLUTION INTRAVENOUS at 08:28

## 2024-11-04 RX ADMIN — ACETAMINOPHEN 650 MG: 650 SUSPENSION ORAL at 00:00

## 2024-11-04 NOTE — PROGRESS NOTES
Progress Note - Palliative Care   Name: Maira Moura 69 y.o. female I MRN: [id number]  Unit/Bed#: -01 I Date of Admission: 10/29/2024   Date of Service: 11/4/2024 I Hospital Day: 5     Assessment & Plan  Right tonsillar squamous cell carcinoma (HCC)  Summary of Recommendations:  Primary Medical Oncologist discussed case with Radiation Oncology - Hold further radiation at this time.  Resumption of outpatient radiation this week, is dependent on clinical course.  Continue pain-regimen, as follows:  Continue Acetaminophen 650 mg Q6HR Scheduled.  Continue Oxycodone 5 mg Per-Tube Q4HR PRN.  Continue Gabapentin 300 mg Per-Tube Daily, and 600 mg Per-Tube Bedtime (Scheduled).  Continue anti-emetic regimen, as follows: Ondansetron 4 mg Per-Tube Q4HR PRN (Nausea).  Continue bowel-regimen to prevent opiate-induced constipation: Miralax 17 Grams Per Tube Daily.  Recommend Physical and Occupational Therapy     Palliative care encounter  Palliative Diagnosis: R tonsillar SCC    Goals:   Continue full code at this time  Palliative will follow for ongoing goals of care discussions as situation evolves.    Social Support:  Supportive listening provided  Normalized experience of patient  Provided anxiety containment  Advocated for patient/family with interdisciplinary team  Mediated conflict    Care Coordination  Case discussed with primary team    Follow-up  We appreciate the opportunity to participate in this patient's care.   We will continue to follow while admitted.    Please do not hesitate to contact our on-call provider through [employer] Secure Chat or contact [phone] should there be an acute change or other symptom control concerns.   Interval history:       Patient seen and examined. She states the pain is now tolerable and the oxycodone is what helps the most. No other complaints at this time.     MEDICATIONS / ALLERGIES:     all current active meds have been reviewed    No Known  Allergies    OBJECTIVE:    Physical Exam  Physical Exam  Vitals reviewed.   Constitutional:       Appearance: She is ill-appearing.   HENT:      Mouth/Throat:      Mouth: Mucous membranes are moist.      Pharynx: Oropharynx is clear.   Cardiovascular:      Rate and Rhythm: Normal rate and regular rhythm.   Pulmonary:      Breath sounds: Normal breath sounds.   Musculoskeletal:      Right lower leg: No edema.      Left lower leg: No edema.   Skin:     General: Skin is warm and dry.   Neurological:      Mental Status: She is alert and oriented to person, place, and time.   Psychiatric:         Mood and Affect: Mood normal.         Behavior: Behavior normal.         Lab Results:   Results from last 7 days   Lab Units 11/04/24  0516 11/03/24  0749 11/02/24  0523 10/30/24  0749 10/30/24  0434   WBC Thousand/uL 2.02* 2.01* 1.97*   < > 1.76*   HEMOGLOBIN g/dL 8.7* 8.7* 9.2*   < > 6.2*   HEMATOCRIT % 27.1* 26.5* 27.7*   < > 19.1*   PLATELETS Thousands/uL 210 186 180   < > 131*   SEGS PCT % 56  --  57  --  65   MONO PCT % 20*  --  14*  8   < > 16*   EOS PCT % 3  --  3  4   < > 2    < > = values in this interval not displayed.     Results from last 7 days   Lab Units 11/04/24  0516 11/03/24  0749 11/02/24  0523 11/01/24  1507 10/31/24  0638   POTASSIUM mmol/L 4.7 4.4 4.1   < > 4.0   CHLORIDE mmol/L 104 106 105   < > 104   CO2 mmol/L 31 31 30   < > 30   BUN mg/dL 27* 27* 26*   < > 25   CREATININE mg/dL 0.92 0.90 0.90   < > 0.96   CALCIUM mg/dL 8.6 8.3* 8.6   < > 8.5   ALK PHOS U/L 107*  --  107*  --  104   ALT U/L 18  --  22  --  28   AST U/L 22  --  24  --  32    < > = values in this interval not displayed.       Imaging Studies: reviewed pertinent studies   EKG, Pathology, and Other Studies: reviewed pertinent studies    Counseling / Coordination of Care    Total floor / unit time spent today 35 minutes. Greater than 50% of total time was spent with the patient and / or family counseling and / or coordination of care. A  description of the counseling / coordination of care: symptom assessment and management, medication review, and chart review

## 2024-11-04 NOTE — QUICK NOTE
Attempted to see patient.  Door closed and lights off.  I remain available overnight if needed further.    Stepan Telles,   PGY-2 [facility] [place]   11/03/24, 8:32 PM

## 2024-11-04 NOTE — CASE MANAGEMENT
Case Management Discharge Planning Note    Patient name Maira Moura  Location /-01 MRN [id number]  : 1955 Date 2024       Current Admission Date: 10/29/2024  Current Admission Diagnosis:Neoplastic malignant related fatigue   Patient Active Problem List    Diagnosis Date Noted Date Diagnosed    Pancytopenia due to chemotherapy (HCC) 10/30/2024     Neoplastic malignant related fatigue 10/29/2024     Sensation, choking 10/29/2024     S/P percutaneous endoscopic gastrostomy (PEG) tube placement (HCC) 10/29/2024     Vitamin B12 deficiency 2024     Elevated LFTs 2024     Chronic nasal congestion 2024     Hypokalemia 03/15/2024     Leukemoid reaction 03/15/2024     GOGO (acute kidney injury) (HCC) 03/15/2024     Acute post-operative pain 03/15/2024     At risk for constipation 03/15/2024     At risk for delirium 03/15/2024     Palliative care encounter 03/15/2024     Physical deconditioning 03/15/2024     History of CVA (cerebrovascular accident) 03/15/2024     Diastolic dysfunction 2024     Neuropathy 2024     Thrombocytopenia (HCC) 2023     Stage 3a chronic kidney disease (HCC) 2023     Chemotherapy induced neutropenia (HCC) 2023     Dehydration 10/06/2023     Drug-induced constipation 10/06/2023     Nutritional anemia 2023     Poor appetite 2023     Right tonsillar squamous cell carcinoma (HCC) 2023     Metastatic colon cancer to liver (HCC) 2023     GERD (gastroesophageal reflux disease)      Obesity      Right thyroid nodule 2023     Prediabetes 2022     Vitamin D deficiency 2019     Malignant neoplasm of right female breast (HCC) 2018     Mixed hyperlipidemia 2018     Primary hypertension 2017       LOS (days): 5  Geometric Mean LOS (GMLOS) (days): 2.8  Days to GMLOS:-2.1     OBJECTIVE:  Risk of Unplanned Readmission Score: 33.74         Current admission status: Inpatient    Preferred Pharmacy:    [facility] [place] - [address]  [address] [address]  Phone: [phone] Fax: [phone]    [facility] [place] - [address]  [address] [place] [address]  Phone: [phone] Fax: [phone]    [facility] [place] - [address],  [address],  First Floor [facility] [address]  Phone: [phone] Fax: [phone]    Primary Care Provider: Fanta Turner MD    Primary Insurance: MEDICARE  Secondary Insurance: [facility]    DISCHARGE DETAILS:     IMM Given (Date):: 11/04/24  IMM Given to:: Patient    IMM reviewed with patient, patient agrees with discharge determination.     Patient states she has all tube feeding supplies at home.  Patient lives with son and daughter, states that they support her regarding the TF and ADLS at home

## 2024-11-04 NOTE — ASSESSMENT & PLAN NOTE
Palliative Diagnosis: R tonsillar SCC    Goals:   Continue full code at this time  Palliative will follow for ongoing goals of care discussions as situation evolves.    Social Support:  Supportive listening provided  Normalized experience of patient  Provided anxiety containment  Advocated for patient/family with interdisciplinary team  Mediated conflict    Care Coordination  Case discussed with primary team    Follow-up  We appreciate the opportunity to participate in this patient's care.   We will continue to follow while admitted.    Please do not hesitate to contact our on-call provider through [facility] Secure Chat or contact [phone] should there be an acute change or other symptom control concerns.

## 2024-11-04 NOTE — DISCHARGE INSTR - AVS FIRST PAGE
Please follow up with your PCP within 1 week of discharge from the hospital  Please resume outpatient follow up with ENT as recommended  Please resume outpatient follow up with Rad/Onc as recommended  Please resume outpatient follow up with Palliative as recommended  Please resume outpatient follow up with Heme/Onc as recommended

## 2024-11-04 NOTE — PROGRESS NOTES
In-basket message received from Queenie Arnold PA-C to add patient to the head and neck [facility] on 11/11/2024. Chart reviewed and prep completed.

## 2024-11-04 NOTE — ASSESSMENT & PLAN NOTE
Summary of Recommendations:  Primary Medical Oncologist discussed case with Radiation Oncology - Hold further radiation at this time.  Resumption of outpatient radiation this week, is dependent on clinical course.  Continue pain-regimen, as follows:  Continue Acetaminophen 650 mg Q6HR Scheduled.  Continue Oxycodone 5 mg Per-Tube Q4HR PRN.  Continue Gabapentin 300 mg Per-Tube Daily, and 600 mg Per-Tube Bedtime (Scheduled).  Continue anti-emetic regimen, as follows: Ondansetron 4 mg Per-Tube Q4HR PRN (Nausea).  Continue bowel-regimen to prevent opiate-induced constipation: Miralax 17 Grams Per Tube Daily.  Recommend Physical and Occupational Therapy

## 2024-11-04 NOTE — PLAN OF CARE
Problem: PAIN - ADULT  Goal: Verbalizes/displays adequate comfort level or baseline comfort level  Description: Interventions:  - Encourage patient to monitor pain and request assistance  - Assess pain using appropriate pain scale  - Administer analgesics based on type and severity of pain and evaluate response  - Implement non-pharmacological measures as appropriate and evaluate response  - Consider cultural and social influences on pain and pain management  - Notify physician/advanced practitioner if interventions unsuccessful or patient reports new pain  Outcome: Progressing     Problem: INFECTION - ADULT  Goal: Absence or prevention of progression during hospitalization  Description: INTERVENTIONS:  - Assess and monitor for signs and symptoms of infection  - Monitor lab/diagnostic results  - Monitor all insertion sites, i.e. indwelling lines, tubes, and drains  - Monitor endotracheal if appropriate and nasal secretions for changes in amount and color  - Rock appropriate cooling/warming therapies per order  - Administer medications as ordered  - Instruct and encourage patient and family to use good hand hygiene technique  - Identify and instruct in appropriate isolation precautions for identified infection/condition  Outcome: Progressing     Problem: SAFETY ADULT  Goal: Patient will remain free of falls  Description: INTERVENTIONS:  - Educate patient/family on patient safety including physical limitations  - Instruct patient to call for assistance with activity   - Consult OT/PT to assist with strengthening/mobility   - Keep Call bell within reach  - Keep bed low and locked with side rails adjusted as appropriate  - Keep care items and personal belongings within reach  - Initiate and maintain comfort rounds  - Make Fall Risk Sign visible to staff  - Offer Toileting every 2 Hours, in advance of need  - Initiate/Maintain bed alarm  - Obtain necessary fall risk management equipment: walker  Problem: DISCHARGE  PLANNING  Goal: Discharge to home or other facility with appropriate resources  Description: INTERVENTIONS:  - Identify barriers to discharge w/patient and caregiver  - Arrange for needed discharge resources and transportation as appropriate  - Identify discharge learning needs (meds, wound care, etc.)  - Arrange for interpretive services to assist at discharge as needed  - Refer to Case Management Department for coordinating discharge planning if the patient needs post-hospital services based on physician/advanced practitioner order or complex needs related to functional status, cognitive ability, or social support system  Outcome: Progressing     Problem: Knowledge Deficit  Goal: Patient/family/caregiver demonstrates understanding of disease process, treatment plan, medications, and discharge instructions  Description: Complete learning assessment and assess knowledge base.  Interventions:  - Provide teaching at level of understanding  - Provide teaching via preferred learning methods  Outcome: Progressing     - Apply yellow socks and bracelet for high fall risk patients  - Consider moving patient to room near nurses station  Outcome: Progressing

## 2024-11-04 NOTE — CASE MANAGEMENT
Case Management Discharge Planning Note    Patient name Maira Moura  Location /-01 MRN [id number]  : 1955 Date 2024       Current Admission Date: 10/29/2024  Current Admission Diagnosis:Neoplastic malignant related fatigue   Patient Active Problem List    Diagnosis Date Noted Date Diagnosed    Pancytopenia due to chemotherapy (HCC) 10/30/2024     Neoplastic malignant related fatigue 10/29/2024     Sensation, choking 10/29/2024     S/P percutaneous endoscopic gastrostomy (PEG) tube placement (HCC) 10/29/2024     Vitamin B12 deficiency 2024     Elevated LFTs 2024     Chronic nasal congestion 2024     Hypokalemia 03/15/2024     Leukemoid reaction 03/15/2024     GOGO (acute kidney injury) (HCC) 03/15/2024     Acute post-operative pain 03/15/2024     At risk for constipation 03/15/2024     At risk for delirium 03/15/2024     Palliative care encounter 03/15/2024     Physical deconditioning 03/15/2024     History of CVA (cerebrovascular accident) 03/15/2024     Diastolic dysfunction 2024     Neuropathy 2024     Thrombocytopenia (HCC) 2023     Stage 3a chronic kidney disease (HCC) 2023     Chemotherapy induced neutropenia (HCC) 2023     Dehydration 10/06/2023     Drug-induced constipation 10/06/2023     Nutritional anemia 2023     Poor appetite 2023     Right tonsillar squamous cell carcinoma (HCC) 2023     Metastatic colon cancer to liver (HCC) 2023     GERD (gastroesophageal reflux disease)      Obesity      Right thyroid nodule 2023     Prediabetes 2022     Vitamin D deficiency 2019     Malignant neoplasm of right female breast (HCC) 2018     Mixed hyperlipidemia 2018     Primary hypertension 2017       LOS (days): 5  Geometric Mean LOS (GMLOS) (days): 2.8  Days to GMLOS:-2.2     OBJECTIVE:  Risk of Unplanned Readmission Score: 33.74         Current admission status: Inpatient    Preferred Pharmacy:    [facility] [place] [address]  [address] [address]  Phone: [phone] Fax: [phone]    [facility] [place] - [address]  [address] [address]  Phone: [phone] Fax: [phone]    [facility] [place] [address],  [address],  First Floor [facility] [address]  Phone: [phone] Fax: [phone]    Primary Care Provider: Fanta Turner MD    Primary Insurance: MEDICARE  Secondary Insurance: [facility]    DISCHARGE DETAILS:    Other Referral/Resources/Interventions Provided:  Referral Comments: This CM offered HH to patient, patient declined services, attempted to call son and daughter, unable to get in contact with them.  Patient continues to state that she does not need HHC

## 2024-11-04 NOTE — NURSING NOTE
RENEE paperwork provided to pt. Angelica deaccessed by RN. Left via wheelchair, son to take pt home.

## 2024-11-05 ENCOUNTER — TELEPHONE (OUTPATIENT)
Dept: HEMATOLOGY ONCOLOGY | Facility: CLINIC | Age: 69
End: 2024-11-05

## 2024-11-05 ENCOUNTER — APPOINTMENT (OUTPATIENT)
Dept: RADIATION ONCOLOGY | Facility: CLINIC | Age: 69
End: 2024-11-05
Attending: RADIOLOGY
Payer: MEDICARE

## 2024-11-05 ENCOUNTER — TRANSITIONAL CARE MANAGEMENT (OUTPATIENT)
Dept: FAMILY MEDICINE CLINIC | Facility: CLINIC | Age: 69
End: 2024-11-05

## 2024-11-05 ENCOUNTER — NUTRITION (OUTPATIENT)
Dept: NUTRITION | Facility: CLINIC | Age: 69
End: 2024-11-05

## 2024-11-05 ENCOUNTER — APPOINTMENT (OUTPATIENT)
Dept: RADIATION ONCOLOGY | Facility: CLINIC | Age: 69
End: 2024-11-05
Payer: MEDICARE

## 2024-11-05 DIAGNOSIS — Z71.3 NUTRITIONAL COUNSELING: Primary | ICD-10-CM

## 2024-11-05 PROCEDURE — 77014 CHG CT GUIDANCE RADIATION THERAPY FLDS PLACEMENT: CPT | Performed by: RADIOLOGY

## 2024-11-05 PROCEDURE — 77386 HB NTSTY MODUL RAD TX DLVR CPLX: CPT | Performed by: RADIOLOGY

## 2024-11-05 NOTE — UTILIZATION REVIEW
NOTIFICATION OF ADMISSION DISCHARGE   This is a Notification of Discharge from [facility]. Please be advised that this patient has been discharge from our facility. Below you will find the admission and discharge date and time including the patient’s disposition.   UTILIZATION REVIEW CONTACT:  Jolly Chavez  Utilization   Network Utilization Review Department  Phone: [phone] x carefully listen to the prompts. All voicemails are confidential.  Email: [email or web address]     ADMISSION INFORMATION  PRESENTATION DATE: 10/29/2024 12:04 PM  OBERVATION ADMISSION DATE: 10/29/2024 1613  INPATIENT ADMISSION DATE: 10/30/24  3:33 PM   DISCHARGE DATE: 11/4/2024  4:43 PM   DISPOSITION:Home/Self Care    Network Utilization Review Department  ATTENTION: Please call with any questions or concerns to [phone] and carefully listen to the prompts so that you are directed to the right person. All voicemails are confidential.   For Discharge needs, contact Care Management DC Support Team at [phone] opt. 2  Send all requests for admission clinical reviews, approved or denied determinations and any other requests to dedicated fax number below belonging to the campus where the patient is receiving treatment. List of dedicated fax numbers for the Facilities:  FACILITY NAME UR FAX NUMBER   ADMISSION DENIALS (Administrative/Medical Necessity) [phone]   DISCHARGE SUPPORT TEAM ([facility]) [phone]   PARENT CHILD HEALTH (Maternity/NICU/Pediatrics) [phone]   [facility] [phone]   [facility] [phone]   [facility] [phone]   [facility] [phone]   [facility] [phone]   [facility] [phone]   [facility] [phone]   [facility]  [phone]   [facility] [phone]   [facility] [phone]   [facility] [phone]   [facility] [phone]

## 2024-11-05 NOTE — TELEPHONE ENCOUNTER
I phoned the patient and left a VM message indicating that I was calling from [facility]'s Hematology/Oncology, Dr. Medina's office, regarding a follow up appointment. The new appointment details were provided (11/26, 0840, [place] office), as well as the [facility] number as the office contact.

## 2024-11-05 NOTE — PROGRESS NOTES
Outpatient Oncology Nutrition Consultation   Type of Consult: Follow Up   Care Location: Radiation Oncology    Reason for referral: Received notification by [facility] PEPE ZAPATA on 8/21/24 that pt has triggered for oncology nutrition care (reason for referral: HNC dx and Malnutrition Screening Tool (MST) Triggers: scored a 0 indicating No recent wt loss and is not eating poorly due to a decreased appetite. 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  - grade 2  - %  - VT 95%  - HER2 negative     12/20/2018 Surgery    Right partial mastectomy and SLN biopsy:  Infiltrating ductal carcinoma, Grade 2/3, felt to be between 2.0 cm and 2.5 cm in greatest dimension  - No invasive tumor is seen at inked margins, but there is a focus of DCIS seen within approximately 1mm of the inked medial margin  - no LVI  - no LCIS  - 0/2 LN's  at least Stage IIA - pT2, pN0, cM0, G2.    - Dr Coronado     12/20/2018 -  Cancer Staged    Stage IIA - pT2, pN0, cM0, G2.       1/4/2019 Genomic Testing    Oncotype DX score: 13     2/1/2019 -  Hormone Therapy    anastrozole 1 mg daily as adjuvant endocrine therapy    - Dr Daley       2/14/2019 -  "4/3/2019 Radiation    Course: C1    Plan ID Energy Fractions Dose per Fraction (cGy) Dose Correction (cGy) Total Dose Delivered (cGy) Elapsed Days   R Breast 10X/6X 25 / 25 200 0 5,000 40   R BRST BOOST 16E 5 / 5 200 0 1,000 7      Treatment dates:  C1: 2/14/2019 - 4/3/2019       Metastatic colon cancer to liver (HCC)   7/6/2023 Genetic Testing    CARIS Results:   KRAS Pathogenic Variant Exon 2  p.G12D : lack of benefit of cetuximab, panitumumab Level 2   No other actionable mutation; MSI stable; TMB low   MiFOLOXAi Results: \"Decreased Benefit of FOLFOX + Bevacizumab in first line metastatic CRC.  This patient may achieve improved results by receiving an alternative to FOLFOX, such as FOLFIRI, as their initial regimen. As an adjustment to frontline FOLFOXIRI following toxicity: This patient may achieve improved results by removing the oxaliplatin portion of their regimen\".         7/11/2023 Initial Diagnosis    Metastatic colon cancer to liver (HCC)     7/13/2023 -  Cancer Staged    Staging form: Colon and Rectum, AJCC 8th Edition  - Clinical stage from 7/13/2023: cT3, cN0, pM1 - Signed by Harika Medina DO on 9/28/2023  Total positive nodes: 0       7/31/2023 - 8/1/2024 Chemotherapy    cyanocobalamin, 1,000 mcg, Intramuscular, Every 30 days, 7 of 9 cycles  Administration: 1,000 mcg (5/9/2024), 1,000 mcg (5/24/2024), 1,000 mcg (6/20/2024), 1,000 mcg (7/3/2024), 1,000 mcg (7/18/2024), 1,000 mcg (8/1/2024)  potassium chloride, 20 mEq, Intravenous, Once, 2 of 2 cycles  Administration: 20 mEq (11/6/2023), 20 mEq (11/20/2023)  alteplase (CATHFLO), 2 mg, Intracatheter, Every 1 Minute as needed, 21 of 23 cycles  Administration: 2 mg (1/3/2024)  pegfilgrastim (NEULASTA), 6 mg, Subcutaneous, Once, 12 of 12 cycles  Administration: 6 mg (10/25/2023), 6 mg (11/8/2023), 6 mg (11/22/2023), 6 mg (12/6/2023), 6 mg (12/20/2023), 6 mg (1/12/2024), 6 mg (1/25/2024), 6 mg (4/25/2024), 6 mg (5/9/2024), 6 mg (5/24/2024), 6 mg " (6/20/2024)  fluorouracil (ADRUCIL), 895 mg, Intravenous, Once, 21 of 23 cycles  Dose modification: 320 mg/m2 (original dose 400 mg/m2, Cycle 11)  Administration: 900 mg (7/31/2023), 900 mg (8/14/2023), 900 mg (8/28/2023), 900 mg (9/11/2023), 900 mg (9/25/2023), 900 mg (10/9/2023), 900 mg (10/23/2023), 895 mg (11/6/2023), 895 mg (11/20/2023), 895 mg (12/4/2023), 700 mg (12/18/2023), 680 mg (1/10/2024), 680 mg (1/23/2024), 625 mg (4/23/2024), 625 mg (5/7/2024), 625 mg (5/22/2024), 625 mg (6/4/2024), 625 mg (6/18/2024), 625 mg (7/1/2024), 625 mg (7/16/2024), 625 mg (7/30/2024)  nivolumab (OPDIVO) IVPB, 240 mg (100 % of original dose 240 mg), Intravenous, Once, 13 of 15 cycles  Dose modification: 240 mg (original dose 240 mg, Cycle 9)  Administration: 240 mg (11/20/2023), 240 mg (12/4/2023), 240 mg (12/18/2023), 240 mg (1/10/2024), 240 mg (1/23/2024), 240 mg (4/23/2024), 240 mg (5/7/2024), 240 mg (5/22/2024), 240 mg (6/4/2024), 240 mg (6/18/2024), 240 mg (7/1/2024), 240 mg (7/16/2024), 240 mg (7/30/2024)  leucovorin calcium IVPB, 896 mg, Intravenous, Once, 21 of 23 cycles  Administration: 900 mg (7/31/2023), 900 mg (8/14/2023), 900 mg (8/28/2023), 900 mg (9/11/2023), 900 mg (9/25/2023), 900 mg (10/9/2023), 900 mg (10/23/2023), 900 mg (11/6/2023), 900 mg (11/20/2023), 900 mg (12/4/2023), 900 mg (12/18/2023), 850 mg (1/10/2024), 850 mg (1/23/2024), 800 mg (4/23/2024), 800 mg (5/7/2024), 800 mg (5/22/2024), 800 mg (6/4/2024), 800 mg (6/18/2024), 800 mg (7/1/2024), 800 mg (7/16/2024), 800 mg (7/30/2024)  oxaliplatin (ELOXATIN) chemo infusion, 85 mg/m2 = 190.4 mg, Intravenous, Once, 12 of 12 cycles  Dose modification: 68 mg/m2 (original dose 85 mg/m2, Cycle 11, Reason: Other (Must fill in a comment), Comment: increased fatigue)  Administration: 190.4 mg (7/31/2023), 190.4 mg (8/14/2023), 200 mg (8/28/2023), 200 mg (9/11/2023), 200 mg (9/25/2023), 200 mg (10/9/2023), 200 mg (10/23/2023), 200 mg (11/6/2023), 200 mg  (11/20/2023), 190.4 mg (12/4/2023), 150 mg (12/18/2023), 150 mg (1/10/2024)  fluorouracil (ADRUCIL) ambulatory infusion Soln, 1,200 mg/m2/day = 5,375 mg, Intravenous, Over 46 hours, 21 of 23 cycles     9/26/2023 -  Cancer Staged    Staging form: Colon and Rectum, AJCC 8th Edition  - Pathologic: pT3, pN0, cM1 - Signed by Vickie Israel MD on 4/3/2024  Total positive nodes: 0       3/12/2024 Surgery    A. Terminal ileum, appendix, right colon (right hemicolectomy):  - Adenocarcinoma (5.2cm)  - Forty-nine (49) lymph nodes negative for carcinoma (0/49)    - Acellular mucin present in one (1) lymph node   - See staging synoptic (ypT3N0)     Right tonsillar squamous cell carcinoma (HCC)   7/27/2023 Initial Diagnosis    Right tonsillar squamous cell carcinoma (HCC)     7/27/2023 -  Cancer Staged    Staging form: Pharynx - Oropharynx, AJCC 8th Edition  - Clinical stage from 7/27/2023: Stage III (cT1, cN3, cM0, p16+) - Signed by Evan Plummer MD on 7/27/2023  Stage prefix: Initial diagnosis       9/17/2024 -  Chemotherapy    alteplase (CATHFLO), 2 mg, Intracatheter, Every 1 Minute as needed, 6 of 6 cycles  Administration: 2 mg (10/21/2024)  CISplatin (PLATINOL) infusion, 70 mg (original dose 40 mg/m2), Intravenous, Once, 6 of 6 cycles  Dose modification: 70 mg (original dose 40 mg/m2, Cycle 1, Reason: Anticipated Tolerance), 30 mg/m2 (original dose 40 mg/m2, Cycle 4, Reason: Neuropathy, Comment: dose reduction to 30 mg/m2 due to neuropathy)  Administration: 70 mg (9/17/2024), 70 mg (9/23/2024), 70 mg (9/30/2024), 59.1 mg (10/7/2024), 59.1 mg (10/14/2024), 59.1 mg (10/21/2024)  sodium chloride, 500 mL, Intravenous, Once, 6 of 6 cycles  Administration: 500 mL (9/17/2024), 500 mL (9/17/2024), 500 mL (9/23/2024), 500 mL (9/23/2024), 500 mL (9/30/2024), 500 mL (9/30/2024), 500 mL (10/7/2024), 500 mL (10/7/2024), 500 mL (10/14/2024), 500 mL (10/14/2024), 500 mL (10/21/2024)  fosaprepitant (EMEND) IVPB, 150 mg,  Intravenous, Once, 6 of 6 cycles  Administration: 150 mg (9/17/2024), 150 mg (9/23/2024), 150 mg (9/30/2024), 150 mg (10/7/2024), 150 mg (10/14/2024), 150 mg (10/21/2024)  IVPB builder, , Intravenous, Once, 1 of 1 cycle  Administration: Unknown dose (10/21/2024)       Past Medical & Surgical Hx:   Patient Active Problem List   Diagnosis    Primary hypertension    Mixed hyperlipidemia    Malignant neoplasm of right female breast (HCC)    Vitamin D deficiency    Prediabetes    Right thyroid nodule    GERD (gastroesophageal reflux disease)    Obesity    Metastatic colon cancer to liver (HCC)    Right tonsillar squamous cell carcinoma (HCC)    Poor appetite    Nutritional anemia    Dehydration    Drug-induced constipation    Chemotherapy induced neutropenia (HCC)    Stage 3a chronic kidney disease (HCC)    Thrombocytopenia (HCC)    Neuropathy    Diastolic dysfunction    Hypokalemia    Leukemoid reaction    GOGO (acute kidney injury) (HCC)    Acute post-operative pain    At risk for constipation    At risk for delirium    Palliative care encounter    Physical deconditioning    History of CVA (cerebrovascular accident)    Chronic nasal congestion    Elevated LFTs    Vitamin B12 deficiency    Neoplastic malignant related fatigue    Sensation, choking    S/P percutaneous endoscopic gastrostomy (PEG) tube placement (HCC)    Pancytopenia due to chemotherapy (HCC)     Past Medical History:   Diagnosis Date    Anemia     Arthritis     Body mass index (BMI) 40.0-44.9, adult (HCC)     Breast cancer (HCC) 12/17/2018    Cancer (HCC)     right breast, colon, liver, right tonsil    Cervical lymphadenopathy     CT neck on 3/30/2023- Large right level 2A lymphadenopathy as described above and suspicious for neoplasm.  Correlation with histopathology is recommended.  Mild asymmetric prominence of the right palatine tonsil with otherwise no definitive nodular enhancing lesions identified along the course of the aerodigestive tract.      23- FNA of this node was nonrevealing for tissue etiology, but it d    Encounter for screening for HIV 2022    Follow-up examination 2023    GERD (gastroesophageal reflux disease)     Hyperlipidemia     Hypertension     Obesity     Stroke (HCC)     TIA     Stroke (HCC)     TIA     Transaminitis 2021    Vasomotor rhinitis     Refilled flonase today. Stopped taking since 2022     Past Surgical History:   Procedure Laterality Date    BREAST BIOPSY Right 2018    us guided bx cancer    BREAST SURGERY      COLONOSCOPY      ESOPHAGOSCOPY N/A 2023    Procedure: ESOPHAGOSCOPY;  Surgeon: David Chapa MD;  [facility]: [facility];  Service: ENT    HEMICOLOECTOMY W/ ANASTOMOSIS Right 3/12/2024    Procedure: RIGHT COLECTOMY WITH ROBOTICS;  Surgeon: Vickie Israel MD;  Location: [facility];  Service: Surgical Oncology    IR BIOPSY LIVER MASS  2023    IR CRYOABLATION  6/3/2024    IR GASTROSTOMY TUBE PLACEMENT  10/2/2024    IR PORT CHECK  2024    IR PORT PLACEMENT  2023    IR PORT STRIPPING  2024    LAPAROTOMY N/A 3/12/2024    Procedure: LAPAROTOMY EXPLORATORY W/ ROBOTICS;  Surgeon: Vickie Israel MD;  Location: [facility];  Service: Surgical Oncology    DC BIOPSY NASOPHARYNX VISIBLE LESION SIMPLE N/A 10/9/2024    Procedure: NASOPHARYNGOSCOPY with biospy;  Surgeon: Juanito Matthew MD;  [facility];  Service: ENT    [facility] W/CELL WASHG SPX N/A 2023    Procedure: BRONCHOSCOPY;  Surgeon: David Chapa MD;  [facility];  Service: ENT    DC LARYNGOSCOPY W/BIOPSY MICROSCOPE/TELESCOPE N/A 2023    Procedure: MICRODIRECT LARYNGOSCOPY WITH BIOPSY;  Surgeon: David Chapa MD;  [facility];  Service: ENT    DC LARYNGOSCOPY W/WO TRACHEOSCOPY DX EXCEPT  N/A 10/9/2024    Procedure: DIRECT LARYNGOSCOPY;  Surgeon: Juanito Matthew MD;  [facility];  Service: ENT    DC MASTECTOMY PARTIAL  W/AXILLARY LYMPHADENECTOMY Right 12/20/2018    Procedure: ULTRASOUND LOCALIZED PARTIAL MASTECTOMY W/SENTINEL NODE BIOPSY POSS AXILLARY DISSECTION;  Surgeon: Zahida Coronado MD;  Location: [facility];  Service: General    HI TONSILLECTOMY PRIMARY/SECONDARY AGE 13/> N/A 10/9/2024    Procedure: TONSILLECTOMY;  Surgeon: Juanito Matthew MD;  [facility];  Service: ENT    TUBAL LIGATION      US GUIDED BREAST BIOPSY RIGHT COMPLETE Right 11/23/2018    US GUIDED INJECTION FOR RESEARCH STUDY  12/20/2018    US GUIDED INJECTION FOR RESEARCH STUDY  12/07/2018    US GUIDED INJECTION FOR RESEARCH STUDY  05/03/2019    US GUIDED LYMPH NODE BIOPSY RIGHT  05/26/2023       Review of Medications:   Vitamins, Supplements and Herbals: No, pt denies taking supplements    Current Outpatient Medications:     acetaminophen (TYLENOL) 160 mg/5 mL liquid, Take 20 mL (640 mg total) by mouth every 6 (six) hours as needed for mild pain for up to 10 days, Disp: 473 mL, Rfl: 0    amLODIPine (NORVASC) 5 mg tablet, 1 tablet (5 mg total) by Per G Tube route daily, Disp: 30 tablet, Rfl: 0    anastrozole (ARIMIDEX) 1 mg tablet, Take 1 tablet (1 mg total) by mouth daily, Disp: 90 tablet, Rfl: 3    atorvastatin (LIPITOR) 40 mg tablet, Take 1 tablet (40 mg total) by mouth daily at bedtime, Disp: 30 tablet, Rfl: 2    diphenhydramine, lidocaine, Al/Mg hydroxide, simethicone (Magic Mouthwash) SUSP, Swish and swallow 10 mL every 4 (four) hours as needed for mouth pain or discomfort, Disp: 480 mL, Rfl: 1    docusate sodium (COLACE) 50 mg capsule, Take 1 capsule (50 mg total) by mouth 2 (two) times a day, Disp: 90 capsule, Rfl: 1    ergocalciferol (VITAMIN D2) 50,000 units, Take 1 capsule (50,000 Units total) by mouth once a week, Disp: 90 capsule, Rfl: 3    folic acid (FOLVITE) 1 mg tablet, Take 1 tablet (1 mg total) by mouth in the morning, Disp: 90 tablet, Rfl: 3    gabapentin (NEURONTIN) 300 mg capsule, Take 1 pills in the morning, 1 pill at  lunch and 2 pills before bed, Disp: 120 capsule, Rfl: 3    hydrocortisone 1 % ointment, , Disp: , Rfl:     ibuprofen (MOTRIN) 100 mg/5 mL suspension, Take 20 mL (400 mg total) by mouth every 6 (six) hours as needed for moderate pain, Disp: 473 mL, Rfl: 0    lidocaine-prilocaine (EMLA) cream, Apply topically as needed for mild pain, Disp: 30 g, Rfl: 3    losartan (COZAAR) 50 mg tablet, Take 1 tablet (50 mg total) by mouth daily, Disp: 90 tablet, Rfl: 5    magnesium Oxide (MAG-OX) 400 mg TABS, 1 tablet (400 mg total) by Per G Tube route 2 (two) times a day, Disp: 60 tablet, Rfl: 0    mirtazapine (REMERON) 15 mg tablet, Take 1 tablet (15 mg total) by mouth daily at bedtime Patient is also on a 30 mg tablet, for a total dose of 45 mg, Disp: 30 tablet, Rfl: 3    mirtazapine (REMERON) 30 mg tablet, Take 1 tablet (30 mg total) by mouth daily at bedtime, Disp: 30 tablet, Rfl: 3    omeprazole (PriLOSEC) 20 mg delayed release capsule, Take 1 capsule (20 mg total) by mouth daily, Disp: 30 capsule, Rfl: 5    ondansetron (ZOFRAN) 8 mg tablet, Take 1 tablet (8 mg total) by mouth every 8 (eight) hours as needed for nausea or vomiting, Disp: 90 tablet, Rfl: 2    oxyCODONE (ROXICODONE) 5 mg/5 mL solution, Take 5 mL (5 mg total) by mouth every 6 (six) hours as needed for severe pain ((breakthrough pain)) Max Daily Amount: 20 mg, Disp: 473 mL, Rfl: 0    potassium chloride (Klor-Con M20) 20 mEq tablet, Take 1 tablet (20 mEq total) by mouth 2 (two) times a day, Disp: 90 tablet, Rfl: 1    potassium chloride 10% oral solution, 15 mL (20 mEq total) by Per G Tube route 2 (two) times a day, Disp: 473 mL, Rfl: 0    prochlorperazine (COMPAZINE) 10 mg tablet, Take 1 tablet (10 mg total) by mouth every 6 (six) hours as needed for nausea or vomiting, Disp: 90 tablet, Rfl: 2    pyridoxine (VITAMIN B6) 50 mg tablet, Take 1 tablet (50 mg total) by mouth daily, Disp: 90 tablet, Rfl: 3    vitamin B-12 (VITAMIN B-12) 1,000 mcg tablet, , Disp: , Rfl:  "  No current facility-administered medications for this visit.    Most Recent Lab Results:   Lab Results   Component Value Date    WBC 2.02 (L) 11/04/2024    NEUTROABS 1.15 (L) 11/04/2024    IRON 60 11/01/2024    TIBC 177 (L) 11/01/2024    FERRITIN 533 (H) 11/01/2024    CHOLESTEROL 167 04/24/2024    TRIG 137 04/24/2024    HDL 43 (L) 04/24/2024    LDLCALC 97 04/24/2024    ALT 18 11/04/2024    AST 22 11/04/2024    ALB 2.8 (L) 11/04/2024    SODIUM 141 11/04/2024    SODIUM 142 11/03/2024    K 4.7 11/04/2024    K 4.4 11/03/2024     11/04/2024    BUN 27 (H) 11/04/2024    BUN 27 (H) 11/03/2024    CREATININE 0.92 11/04/2024    CREATININE 0.90 11/03/2024    EGFR 64 11/04/2024    PHOS 2.8 11/01/2024    PHOS 3.3 10/30/2024    TSH 1.58 01/03/2022    GLUCOSE 209 (H) 03/12/2024    POCGLU 101 08/05/2024    GLUF 102 (H) 10/30/2024    GLUF 95 09/13/2024    GLUC 96 11/04/2024    HGBA1C 5.5 02/21/2024    HGBA1C 5.9 (H) 06/13/2023    HGBA1C 6.1 (H) 07/09/2022    CALCIUM 8.6 11/04/2024    FOLATE >20.0 (H) 05/23/2019    MG 1.5 (L) 11/04/2024       Anthropometric Measurements:   Height: 66\"  Ht Readings from Last 1 Encounters:   10/29/24 5' 5.98\" (1.676 m)     Wt Readings from Last 20 Encounters:   10/29/24 86.2 kg (190 lb)   10/24/24 86.6 kg (190 lb 14.7 oz)   10/21/24 86.6 kg (190 lb 14.7 oz)   10/15/24 88.5 kg (195 lb)   10/14/24 87.1 kg (192 lb)   10/10/24 89.3 kg (196 lb 13.9 oz)   10/09/24 89.4 kg (197 lb 1.5 oz)   10/07/24 87 kg (191 lb 12.8 oz)   10/02/24 88.9 kg (195 lb 15.8 oz)   10/01/24 89.4 kg (197 lb)   09/30/24 88 kg (194 lb 0.1 oz)   09/23/24 88.2 kg (194 lb 7.1 oz)   09/17/24 90 kg (198 lb 6.6 oz)   09/13/24 88.5 kg (195 lb)   09/10/24 89.8 kg (198 lb)   09/03/24 89.8 kg (198 lb)   08/20/24 92.2 kg (203 lb 3.2 oz)   08/16/24 90.3 kg (199 lb)   08/06/24 88.9 kg (196 lb)   07/30/24 87.8 kg (193 lb 9 oz)       Weight History:   Usual Weight: 275#  Varian: (2/22/19) 264.6#, (4/2/19) 263.4, (9/16/24) 197.4#, (9/23/24) " 194#, (9/30/24) 192.8#, (10/7/24) 190.2#, (10/11/24) 194.6#, (10/14/24) 191#, (10/17/24) 192.8#, (10/21/24) 190#, (10/24/24) 190#, (10/28/24) 189#  Home Scale: n/a    Oncology Nutrition-Anthropometrics      Flowsheet Row Nutrition from 11/5/2024 in [facility] Nutrition from 10/21/2024 in [facility]   Patient [age] (years): 69 years 69 years   Patient (female) height (in): 66 in 66 in   Current Weight to be used for anthropometric calculations (lbs) 190 lbs 190.9 lbs   Current Weight to be used for anthropometric calculations (kg) 86.4 kg 86.8 kg   BMI: 30.7 30.8   IBW female: 130 lbs 130 lbs   IBW (kg) female: 59.1 kg 59.1 kg   IBW % (female) 146.2 % 146.8 %   Adjusted BW (female): 145 lbs 145.2 lbs   Adjusted BW kg (female): 65.9 kg 66 kg   % weight change after 1 week: -0.5 % -0.6 %   Weight change after 1 week (lbs) -0.9 lbs -1.1 lbs   % weight change after 1 month: -2.1 % -1.8 %   Weight change after 1 month (lbs) -4.1 lbs -3.5 lbs   % weight change after 3 months: -1.9 % -2.7 %   Weight change after 3 months (lbs) -3.6 lbs -5.3 lbs            Nutrition-Focused Physical Findings: none observed    Food/Nutrition-Related History & Client/Social History:    Current Nutrition Impact Symptoms:  [] Nausea  [x] Reduced Appetite  [] Acid Reflux    [] Vomiting  [x] Unintended Wt Loss [] Malabsorption    [] Diarrhea -resolved [] Unintended Wt Gain  [] Dumping Syndrome    [] Constipation  [] Thick Mucous/Secretions  [] Abdominal Pain    [] Dysgeusia (Altered Taste)  [x] Xerostomia (Dry Mouth)  [] Gas    [] Dysosmia (Altered Smell)  [] Thrush  [] Difficulty Chewing    [] Oral Mucositis (Sore Mouth)  [x] Fatigue  [] Hyperglycemia   Lab Results   Component Value Date    HGBA1C 5.5 02/21/2024      [x] Odynophagia  [] Esophagitis  [] Other:    [x] Dysphagia -enteral nutrition as 1' source of nutrition  Was upgraded to pureed foods per SLP while  inpatient [] Early Satiety  [] No Problems Eating      Food Allergies & Intolerances: no    Current Diet: Tube Feeding, pureed  Current Nutrition Intake: Unchanged from last visit  Appetite: Fair   Nutrition Route: PEG, some PO  Oral Care: brushes daily  Activity level: ADLs.     24 Hr Diet Recall:   Had some oatmeal this morning    Beverages: water (sips throughout the day)  Supplements:   None    EN Recall:  DME: [employer]  Access Type: PEG  Formula: Jevity 1.5  Method: Bolus  Regimen: 16oz (474 mL) 3 times per day of Jevity 1.5 via PEG (gravity syringe method to administer boluses; 1 container infusing over ~15-20 minutes).  Flush: 60 mL free water flush  pre/post each bolus (120 mL x 3 boluses = 360 mL)  EN providin mL volume (6 cartons/day), 2133 kcal (100% est needs), 91g protein (100% est needs), 1081 mL free water, 1441 mL total water (72% est needs).      Oncology Nutrition-Estimated Needs      Flowsheet Row Nutrition from 2024 in [facility] Nutrition from 2024 in [facility]   Weight type used Adjusted weight Actual weight   Weight in kilograms (kg) used for estimated needs 66.4 kg 90 kg   Energy needs formula:  30-35 kcal/kg 30-35 kcal/kg   Energy needs based on 30 kcal/k kcal 2700 kcal   Energy needs based on 35 kcal/k kcal 3150 kcal   Protein needs formula: 1.2-1.5 g/kg 1.2-1.5 g/kg   Protein needs based on 1.2 g/k g 108 g   Protein needs based on 1.5 g/kg 100 g 135 g   Fluid needs formula: 30-35 mL/kg 30-35 mL/kg   Fluid needs based on 30 mL/kg 1992 mL 2700 mL   Fluid needs in ounces 67 oz 91 oz   Fluid needs based on 35 mL/kg 2324 mL 3150 mL   Fluid needs in ounces 79 oz 106 oz             Discussion & Intervention:   Maira was evaluated today for an RD follow up regarding HNC.  Maira is currently undergoing tx for HNC .  Met with Maira today after her RT. She was recently in  the hospital so had a break from treatment. She was seen by SLP and recommended to try pureed foods. She did have some oatmeal today which she tolerated. She continues to get in about 6 cartons of Jevity 1.5 daily which is her goal. Weight is stable over the last month. We discussed continuing TF regimen until PO intake starts to improve more and then we can discuss TF weaning if appropriate. She does still occasionally use Ensure through her PEG. Encouraged her to save these to consume orally and just use her Jevity 1.5 through her PEG.    Reviewed 24 hour recall, which revealed an adequate enteral intake, and discussed ways to optimize nutrient intake.  Also reviewed the importance of wt management throughout the tx process and the role of enteral nutrition in managing wt and overall health.  Based on today's assessment, discussion included: MNT for:  xerostomia, fluid, pureed diet, enteral nutrition, practicing proper oral care, adequate hydration & fluid choices, practicing proper feeding tube use & care, adherence to and compliance with enteral nutrition plan of care, and individualized enteral nutrition recommendations & plan:   .   Moving forward, Maira was encouraged to continue current enteral nutrition plan of care   Materials Provided: not applicable  All questions and concerns addressed during today’s visit.  Maira has RD contact information.    Nutrition Diagnosis:   Increased Nutrient Needs (kcal & pro) related to increased demand for nutrients and disease state as evidenced by cancer dx and pt undergoing tx for cancer.  Swallowing Difficulty related to diagnosis/treatment related causes as evidenced by RT tx, need for feeding tube.  Monitoring & Evaluation:   Goals:  weight maintenance/stabilization  adequate nutrition impact symptom management  pt to meet >/=75% estimated nutrition needs daily  achieve tube feeding goal rate    Progress Towards Goals: Progressing    Nutrition Rx &  Recommendations:  Diet: enteral nutrition as 1' source of nutrition  Stay hydrated by sipping fluids of choice/tolerance throughout the day.    Follow proper oral care; Try baking soda/salt water rinse recipe (mix 3/4 tsp salt + 1 tsp baking soda + 1 qt water; rinse with plain water after using) in Eating Hints book (pg 18).  Brush your teeth before/after meals & before bed.  Weigh yourself regularly. If you notice weight loss, make an effort to increase your daily food/calorie intake. If you continue to notice loss after these efforts, reach out to your dietitian to establish a plan to stabilize weight.   Pureed foods as able  Pureed Foods:  To puree foods: placed chopped food into  or . Add enough liquid to cover the blade. Blend until food is smooth and free of chunks.  You may add non-fat milk, clear broths or non-fat gravies when blending foods.  You can use baby food as long as you have a pureed protein source as the main part of your meal.  Avoid items with added sugars (read food labels).  Ideas for pureed foods: yogurt, cottage cheese, jello, pureed soups, applesauce, pureed fruit, baby food vegetables/pureed vegetables, mashed potatoes, oatmeal or cream of wheat (make with milk for more calories/protein)  TF plan - continue to aim for 6 cartons/day  TF Goal: 2 containers (474 mL) 3 times daily of Jevity 1.5  Water Flushin mL free water flush pre/post each bolus (360 mL water) + additional 150 mL water flushes 4 times daily (600 mL water).   TF provides: 1422 mL total volume (6 cartons), 2133 kcal, 91g protein, 1081 mL free water, 2041 mL total fluid  Allow for substitutions  Your syringes are each 60 mL or 2 fl oz.  Always flush your feeding tube with 60 mL room-temp water 1-2 times daily while feeding tube is not in use.  [facility] when you have 10 days left of TF supplies: [phone], option 3 (customer support).      Follow Up Plan:  24 after RT  Recommend  Referral to Other Providers: none at this time

## 2024-11-06 ENCOUNTER — HOSPITAL ENCOUNTER (OUTPATIENT)
Dept: INFUSION CENTER | Facility: CLINIC | Age: 69
Discharge: HOME | End: 2024-11-06
Payer: MEDICARE

## 2024-11-06 ENCOUNTER — APPOINTMENT (OUTPATIENT)
Dept: RADIATION ONCOLOGY | Facility: CLINIC | Age: 69
End: 2024-11-06
Attending: RADIOLOGY
Payer: MEDICARE

## 2024-11-06 DIAGNOSIS — Z17.0 MALIGNANT NEOPLASM OF UPPER-OUTER QUADRANT OF RIGHT BREAST IN FEMALE, ESTROGEN RECEPTOR POSITIVE (HCC): Primary | ICD-10-CM

## 2024-11-06 DIAGNOSIS — C50.411 MALIGNANT NEOPLASM OF UPPER-OUTER QUADRANT OF RIGHT BREAST IN FEMALE, ESTROGEN RECEPTOR POSITIVE (HCC): Primary | ICD-10-CM

## 2024-11-06 LAB
ALBUMIN SERPL BCG-MCNC: 3.3 G/DL (ref 3.5–5)
ALP SERPL-CCNC: 110 U/L (ref 34–104)
ALT SERPL W P-5'-P-CCNC: 15 U/L (ref 7–52)
ANION GAP SERPL CALCULATED.3IONS-SCNC: 5 MMOL/L (ref 4–13)
AST SERPL W P-5'-P-CCNC: 24 U/L (ref 13–39)
BASOPHILS # BLD AUTO: 0.01 THOUSANDS/ÂΜL (ref 0–0.1)
BASOPHILS NFR BLD AUTO: 0 % (ref 0–1)
BILIRUB SERPL-MCNC: 0.26 MG/DL (ref 0.2–1)
BUN SERPL-MCNC: 34 MG/DL (ref 5–25)
CALCIUM ALBUM COR SERPL-MCNC: 9.7 MG/DL (ref 8.3–10.1)
CALCIUM SERPL-MCNC: 9.1 MG/DL (ref 8.4–10.2)
CHLORIDE SERPL-SCNC: 102 MMOL/L (ref 96–108)
CO2 SERPL-SCNC: 30 MMOL/L (ref 21–32)
CREAT SERPL-MCNC: 1.21 MG/DL (ref 0.6–1.3)
EOSINOPHIL # BLD AUTO: 0.03 THOUSAND/ÂΜL (ref 0–0.61)
EOSINOPHIL NFR BLD AUTO: 1 % (ref 0–6)
ERYTHROCYTE [DISTWIDTH] IN BLOOD BY AUTOMATED COUNT: 17.4 % (ref 11.6–15.1)
GFR SERPL CREATININE-BSD FRML MDRD: 46 ML/MIN/1.73SQ M
GLUCOSE SERPL-MCNC: 103 MG/DL (ref 65–140)
HCT VFR BLD AUTO: 40.6 % (ref 34.8–46.1)
HGB BLD-MCNC: 13.5 G/DL (ref 11.5–15.4)
IMM GRANULOCYTES # BLD AUTO: 0 THOUSAND/UL (ref 0–0.2)
IMM GRANULOCYTES NFR BLD AUTO: 0 % (ref 0–2)
LYMPHOCYTES # BLD AUTO: 0.3 THOUSANDS/ÂΜL (ref 0.6–4.47)
LYMPHOCYTES NFR BLD AUTO: 13 % (ref 14–44)
MAGNESIUM SERPL-MCNC: 1.9 MG/DL (ref 1.9–2.7)
MCH RBC QN AUTO: 30 PG (ref 26.8–34.3)
MCHC RBC AUTO-ENTMCNC: 33.3 G/DL (ref 31.4–37.4)
MCV RBC AUTO: 90 FL (ref 82–98)
MONOCYTES # BLD AUTO: 0.3 THOUSAND/ÂΜL (ref 0.17–1.22)
MONOCYTES NFR BLD AUTO: 13 % (ref 4–12)
NEUTROPHILS # BLD AUTO: 1.71 THOUSANDS/ÂΜL (ref 1.85–7.62)
NEUTS SEG NFR BLD AUTO: 73 % (ref 43–75)
NRBC BLD AUTO-RTO: 0 /100 WBCS
PLATELET # BLD AUTO: 212 THOUSANDS/UL (ref 149–390)
PMV BLD AUTO: 10 FL (ref 8.9–12.7)
POTASSIUM SERPL-SCNC: 4.5 MMOL/L (ref 3.5–5.3)
PROT SERPL-MCNC: 7.4 G/DL (ref 6.4–8.4)
RBC # BLD AUTO: 4.5 MILLION/UL (ref 3.81–5.12)
SODIUM SERPL-SCNC: 137 MMOL/L (ref 135–147)
WBC # BLD AUTO: 2.35 THOUSAND/UL (ref 4.31–10.16)

## 2024-11-06 PROCEDURE — 83735 ASSAY OF MAGNESIUM: CPT | Performed by: INTERNAL MEDICINE

## 2024-11-06 PROCEDURE — 85025 COMPLETE CBC W/AUTO DIFF WBC: CPT | Performed by: INTERNAL MEDICINE

## 2024-11-06 PROCEDURE — 36593 DECLOT VASCULAR DEVICE: CPT

## 2024-11-06 PROCEDURE — 77014 CHG CT GUIDANCE RADIATION THERAPY FLDS PLACEMENT: CPT | Performed by: INTERNAL MEDICINE

## 2024-11-06 PROCEDURE — 80053 COMPREHEN METABOLIC PANEL: CPT | Performed by: INTERNAL MEDICINE

## 2024-11-06 PROCEDURE — 77336 RADIATION PHYSICS CONSULT: CPT | Performed by: RADIOLOGY

## 2024-11-06 PROCEDURE — 96366 THER/PROPH/DIAG IV INF ADDON: CPT

## 2024-11-06 PROCEDURE — 77386 HB NTSTY MODUL RAD TX DLVR CPLX: CPT | Performed by: INTERNAL MEDICINE

## 2024-11-06 PROCEDURE — 96365 THER/PROPH/DIAG IV INF INIT: CPT

## 2024-11-06 RX ADMIN — ALTEPLASE 2 MG: 2.2 INJECTION, POWDER, LYOPHILIZED, FOR SOLUTION INTRAVENOUS at 10:54

## 2024-11-06 RX ADMIN — POTASSIUM CHLORIDE: 2 INJECTION, SOLUTION, CONCENTRATE INTRAVENOUS at 12:19

## 2024-11-06 NOTE — PROGRESS NOTES
Maira Martell  tolerated treatment well with no complications.  TPA dwell to port for no blood return, blood return present after 90min dwell. Patient elected to hydration via PIV while tpa dwelling.    Maira Moura is aware of future appt on 11/8 at 1030.     AVS printed and given to Maira Moura:  Yes

## 2024-11-07 ENCOUNTER — APPOINTMENT (OUTPATIENT)
Dept: RADIATION ONCOLOGY | Facility: CLINIC | Age: 69
End: 2024-11-07
Attending: RADIOLOGY
Payer: MEDICARE

## 2024-11-07 ENCOUNTER — RA CDI HCC (OUTPATIENT)
Dept: OTHER | Facility: HOSPITAL | Age: 69
End: 2024-11-07

## 2024-11-07 PROCEDURE — 77386 HB NTSTY MODUL RAD TX DLVR CPLX: CPT | Performed by: RADIOLOGY

## 2024-11-07 PROCEDURE — 77014 CHG CT GUIDANCE RADIATION THERAPY FLDS PLACEMENT: CPT | Performed by: RADIOLOGY

## 2024-11-07 PROCEDURE — 77427 RADIATION TX MANAGEMENT X5: CPT | Performed by: RADIOLOGY

## 2024-11-08 ENCOUNTER — APPOINTMENT (OUTPATIENT)
Dept: RADIATION ONCOLOGY | Facility: CLINIC | Age: 69
End: 2024-11-08
Attending: RADIOLOGY
Payer: MEDICARE

## 2024-11-08 ENCOUNTER — HOSPITAL ENCOUNTER (OUTPATIENT)
Dept: INFUSION CENTER | Facility: CLINIC | Age: 69
End: 2024-11-08
Payer: MEDICARE

## 2024-11-08 VITALS
DIASTOLIC BLOOD PRESSURE: 83 MMHG | TEMPERATURE: 97.9 F | RESPIRATION RATE: 16 BRPM | HEART RATE: 109 BPM | SYSTOLIC BLOOD PRESSURE: 123 MMHG

## 2024-11-08 DIAGNOSIS — Z17.0 MALIGNANT NEOPLASM OF UPPER-OUTER QUADRANT OF RIGHT BREAST IN FEMALE, ESTROGEN RECEPTOR POSITIVE (HCC): Primary | ICD-10-CM

## 2024-11-08 DIAGNOSIS — C50.411 MALIGNANT NEOPLASM OF UPPER-OUTER QUADRANT OF RIGHT BREAST IN FEMALE, ESTROGEN RECEPTOR POSITIVE (HCC): Primary | ICD-10-CM

## 2024-11-08 LAB
ALBUMIN SERPL BCG-MCNC: 3.1 G/DL (ref 3.5–5)
ALP SERPL-CCNC: 105 U/L (ref 34–104)
ALT SERPL W P-5'-P-CCNC: 14 U/L (ref 7–52)
ANION GAP SERPL CALCULATED.3IONS-SCNC: 7 MMOL/L (ref 4–13)
AST SERPL W P-5'-P-CCNC: 25 U/L (ref 13–39)
BASOPHILS # BLD AUTO: 0 THOUSANDS/ÂΜL (ref 0–0.1)
BASOPHILS NFR BLD AUTO: 0 % (ref 0–1)
BILIRUB SERPL-MCNC: 0.26 MG/DL (ref 0.2–1)
BUN SERPL-MCNC: 30 MG/DL (ref 5–25)
CALCIUM ALBUM COR SERPL-MCNC: 9.5 MG/DL (ref 8.3–10.1)
CALCIUM SERPL-MCNC: 8.8 MG/DL (ref 8.4–10.2)
CHLORIDE SERPL-SCNC: 104 MMOL/L (ref 96–108)
CO2 SERPL-SCNC: 29 MMOL/L (ref 21–32)
CREAT SERPL-MCNC: 1.03 MG/DL (ref 0.6–1.3)
EOSINOPHIL # BLD AUTO: 0.03 THOUSAND/ÂΜL (ref 0–0.61)
EOSINOPHIL NFR BLD AUTO: 1 % (ref 0–6)
ERYTHROCYTE [DISTWIDTH] IN BLOOD BY AUTOMATED COUNT: 17.2 % (ref 11.6–15.1)
GFR SERPL CREATININE-BSD FRML MDRD: 55 ML/MIN/1.73SQ M
GLUCOSE SERPL-MCNC: 121 MG/DL (ref 65–140)
HCT VFR BLD AUTO: 25.9 % (ref 34.8–46.1)
HGB BLD-MCNC: 8.4 G/DL (ref 11.5–15.4)
IMM GRANULOCYTES # BLD AUTO: 0.01 THOUSAND/UL (ref 0–0.2)
IMM GRANULOCYTES NFR BLD AUTO: 0 % (ref 0–2)
LYMPHOCYTES # BLD AUTO: 0.31 THOUSANDS/ÂΜL (ref 0.6–4.47)
LYMPHOCYTES NFR BLD AUTO: 12 % (ref 14–44)
MAGNESIUM SERPL-MCNC: 1.7 MG/DL (ref 1.9–2.7)
MCH RBC QN AUTO: 29 PG (ref 26.8–34.3)
MCHC RBC AUTO-ENTMCNC: 32.4 G/DL (ref 31.4–37.4)
MCV RBC AUTO: 89 FL (ref 82–98)
MONOCYTES # BLD AUTO: 0.3 THOUSAND/ÂΜL (ref 0.17–1.22)
MONOCYTES NFR BLD AUTO: 11 % (ref 4–12)
NEUTROPHILS # BLD AUTO: 1.98 THOUSANDS/ÂΜL (ref 1.85–7.62)
NEUTS SEG NFR BLD AUTO: 76 % (ref 43–75)
NRBC BLD AUTO-RTO: 0 /100 WBCS
PLATELET # BLD AUTO: 227 THOUSANDS/UL (ref 149–390)
PMV BLD AUTO: 10 FL (ref 8.9–12.7)
POTASSIUM SERPL-SCNC: 4 MMOL/L (ref 3.5–5.3)
PROT SERPL-MCNC: 7.2 G/DL (ref 6.4–8.4)
RBC # BLD AUTO: 2.9 MILLION/UL (ref 3.81–5.12)
SODIUM SERPL-SCNC: 140 MMOL/L (ref 135–147)
WBC # BLD AUTO: 2.63 THOUSAND/UL (ref 4.31–10.16)

## 2024-11-08 PROCEDURE — 96365 THER/PROPH/DIAG IV INF INIT: CPT

## 2024-11-08 PROCEDURE — 85025 COMPLETE CBC W/AUTO DIFF WBC: CPT | Performed by: INTERNAL MEDICINE

## 2024-11-08 PROCEDURE — 80053 COMPREHEN METABOLIC PANEL: CPT | Performed by: INTERNAL MEDICINE

## 2024-11-08 PROCEDURE — 96366 THER/PROPH/DIAG IV INF ADDON: CPT

## 2024-11-08 PROCEDURE — 77014 CHG CT GUIDANCE RADIATION THERAPY FLDS PLACEMENT: CPT | Performed by: RADIOLOGY

## 2024-11-08 PROCEDURE — 77386 HB NTSTY MODUL RAD TX DLVR CPLX: CPT | Performed by: RADIOLOGY

## 2024-11-08 PROCEDURE — 83735 ASSAY OF MAGNESIUM: CPT | Performed by: INTERNAL MEDICINE

## 2024-11-08 RX ADMIN — POTASSIUM CHLORIDE: 2 INJECTION, SOLUTION, CONCENTRATE INTRAVENOUS at 11:36

## 2024-11-08 NOTE — PROGRESS NOTES
Pt arrived for lab draws and Hydration. Offers no NEW complaints/concerns. Still has issues with swallowing due to RT and tonsillectomy and states eating and drinking at home is very little. Pt was encouraged to take pain medications as ordered to ease swallowing.     Labs drawn via patent PORT and hydration fully infused via patent PORT with no adverse reactions/infiltrates.    Pt tolerated all well.    AVS printed and handed to patient. Is aware to return on Monday Nov 11th at 1PM.    Was discharged in stable condition.

## 2024-11-11 ENCOUNTER — HOSPITAL ENCOUNTER (OUTPATIENT)
Dept: INFUSION CENTER | Facility: CLINIC | Age: 69
Discharge: HOME | End: 2024-11-11
Payer: MEDICARE

## 2024-11-11 ENCOUNTER — APPOINTMENT (OUTPATIENT)
Dept: RADIATION ONCOLOGY | Facility: CLINIC | Age: 69
End: 2024-11-11
Attending: RADIOLOGY
Payer: MEDICARE

## 2024-11-11 ENCOUNTER — DOCUMENTATION (OUTPATIENT)
Dept: HEMATOLOGY ONCOLOGY | Facility: CLINIC | Age: 69
End: 2024-11-11

## 2024-11-11 VITALS
HEART RATE: 93 BPM | RESPIRATION RATE: 16 BRPM | SYSTOLIC BLOOD PRESSURE: 116 MMHG | DIASTOLIC BLOOD PRESSURE: 80 MMHG | TEMPERATURE: 98.7 F

## 2024-11-11 DIAGNOSIS — Z17.0 MALIGNANT NEOPLASM OF UPPER-OUTER QUADRANT OF RIGHT BREAST IN FEMALE, ESTROGEN RECEPTOR POSITIVE (HCC): Primary | ICD-10-CM

## 2024-11-11 DIAGNOSIS — C50.411 MALIGNANT NEOPLASM OF UPPER-OUTER QUADRANT OF RIGHT BREAST IN FEMALE, ESTROGEN RECEPTOR POSITIVE (HCC): Primary | ICD-10-CM

## 2024-11-11 LAB
ALBUMIN SERPL BCG-MCNC: 3.2 G/DL (ref 3.5–5)
ALP SERPL-CCNC: 118 U/L (ref 34–104)
ALT SERPL W P-5'-P-CCNC: 17 U/L (ref 7–52)
ANION GAP SERPL CALCULATED.3IONS-SCNC: 6 MMOL/L (ref 4–13)
AST SERPL W P-5'-P-CCNC: 26 U/L (ref 13–39)
BASOPHILS # BLD AUTO: 0.01 THOUSANDS/ÂΜL (ref 0–0.1)
BASOPHILS NFR BLD AUTO: 0 % (ref 0–1)
BILIRUB SERPL-MCNC: 0.24 MG/DL (ref 0.2–1)
BUN SERPL-MCNC: 30 MG/DL (ref 5–25)
CALCIUM ALBUM COR SERPL-MCNC: 9.9 MG/DL (ref 8.3–10.1)
CALCIUM SERPL-MCNC: 9.3 MG/DL (ref 8.4–10.2)
CHLORIDE SERPL-SCNC: 100 MMOL/L (ref 96–108)
CO2 SERPL-SCNC: 31 MMOL/L (ref 21–32)
CREAT SERPL-MCNC: 1.23 MG/DL (ref 0.6–1.3)
EOSINOPHIL # BLD AUTO: 0.03 THOUSAND/ÂΜL (ref 0–0.61)
EOSINOPHIL NFR BLD AUTO: 1 % (ref 0–6)
ERYTHROCYTE [DISTWIDTH] IN BLOOD BY AUTOMATED COUNT: 17 % (ref 11.6–15.1)
GFR SERPL CREATININE-BSD FRML MDRD: 45 ML/MIN/1.73SQ M
GLUCOSE SERPL-MCNC: 91 MG/DL (ref 65–140)
HCT VFR BLD AUTO: 28 % (ref 34.8–46.1)
HGB BLD-MCNC: 9.2 G/DL (ref 11.5–15.4)
IMM GRANULOCYTES # BLD AUTO: 0.01 THOUSAND/UL (ref 0–0.2)
IMM GRANULOCYTES NFR BLD AUTO: 0 % (ref 0–2)
LYMPHOCYTES # BLD AUTO: 0.54 THOUSANDS/ÂΜL (ref 0.6–4.47)
LYMPHOCYTES NFR BLD AUTO: 14 % (ref 14–44)
MAGNESIUM SERPL-MCNC: 1.8 MG/DL (ref 1.9–2.7)
MCH RBC QN AUTO: 29 PG (ref 26.8–34.3)
MCHC RBC AUTO-ENTMCNC: 32.9 G/DL (ref 31.4–37.4)
MCV RBC AUTO: 88 FL (ref 82–98)
MONOCYTES # BLD AUTO: 0.77 THOUSAND/ÂΜL (ref 0.17–1.22)
MONOCYTES NFR BLD AUTO: 20 % (ref 4–12)
NEUTROPHILS # BLD AUTO: 2.41 THOUSANDS/ÂΜL (ref 1.85–7.62)
NEUTS SEG NFR BLD AUTO: 65 % (ref 43–75)
NRBC BLD AUTO-RTO: 0 /100 WBCS
PLATELET # BLD AUTO: 327 THOUSANDS/UL (ref 149–390)
PMV BLD AUTO: 10.7 FL (ref 8.9–12.7)
POTASSIUM SERPL-SCNC: 4.3 MMOL/L (ref 3.5–5.3)
PROT SERPL-MCNC: 7.6 G/DL (ref 6.4–8.4)
RBC # BLD AUTO: 3.17 MILLION/UL (ref 3.81–5.12)
SODIUM SERPL-SCNC: 137 MMOL/L (ref 135–147)
WBC # BLD AUTO: 3.77 THOUSAND/UL (ref 4.31–10.16)

## 2024-11-11 PROCEDURE — 77386 HB NTSTY MODUL RAD TX DLVR CPLX: CPT | Performed by: RADIOLOGY

## 2024-11-11 PROCEDURE — 96366 THER/PROPH/DIAG IV INF ADDON: CPT

## 2024-11-11 PROCEDURE — 85025 COMPLETE CBC W/AUTO DIFF WBC: CPT | Performed by: INTERNAL MEDICINE

## 2024-11-11 PROCEDURE — 83735 ASSAY OF MAGNESIUM: CPT | Performed by: INTERNAL MEDICINE

## 2024-11-11 PROCEDURE — 80053 COMPREHEN METABOLIC PANEL: CPT | Performed by: INTERNAL MEDICINE

## 2024-11-11 PROCEDURE — 96365 THER/PROPH/DIAG IV INF INIT: CPT

## 2024-11-11 PROCEDURE — 77014 CHG CT GUIDANCE RADIATION THERAPY FLDS PLACEMENT: CPT | Performed by: RADIOLOGY

## 2024-11-11 RX ADMIN — POTASSIUM CHLORIDE: 2 INJECTION, SOLUTION, CONCENTRATE INTRAVENOUS at 12:50

## 2024-11-11 NOTE — PROGRESS NOTES
Patient arrived to unit without complaint. Patient tolerated IV hydration with Magnesium and Potassium IV without incident. AVS declined and patient aware of next appointment on 11/13/24 at 13:00. Patient left in stable condition.

## 2024-11-11 NOTE — PROGRESS NOTES
HEAD AND NECK MULTIDISCIPLINARY CASE REVIEW    DATE:    11/11/2024    PRESENTING DOCTOR:  Dr. 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     STAGING:   Stage IV colon, [id number] tonsil     Maira Moura is a 69 y.o. female who was presented at the Head and Neck Oncology Multidisciplinary Tumor Conference today.  She has a history of breast cancer s/p BCS and whole breast RT in April 2019, with recent diagnosis of synchronous metastatic colon cancer (M1) as well as squamous cell right tonsillar cancer ([id number]).  She completed 6 cycles of FOLFOX with PET/CT demonstrating interval disease progression in the neck.  Her case was presented at the Multidisciplinary Head and Neck Tumor Board on 8/12/24, which culminated in consensus for re-evaluation by Otolaryngology and Radiation Oncology. Patient was recommended definitive chemoradiation. She initiated concurrent chemoradiation with weekly Cisplatin.       PHYSICIAN RECOMMENDED PLAN:  -Finish RT  -Repeat imaging 6 weeks post RT.    Imaging reviewed:  1/22/24 PET/CT  4/18/24 PET/CT  8/5/24 PET/CT  10/29/24 CT soft tissue neck     Pathology reviewed:  No     Referrals:  None needed at this time.      Procedures:  N/A     Team agreed to plan.  NCCN guidelines were readily available for review at this discussion    The final treatment plan will be left to the discretion of the patient and the treating physician.     DISCLAIMERS:  TO THE TREATING PHYSICIAN:  This conference is a meeting of clinicians from various specialty areas who evaluate and discuss patients for whom a multidisciplinary treatment approach is being considered. Please note that the above opinion was a consensus of the conference attendees and is intended only to assist in quality care of the discussed patient.  The responsibility for follow up on the input given during the conference, along with any final decisions regarding plan of care, is that of the patient  and the patient's provider. Accordingly, appointments have only been recommended based on this information and have NOT been scheduled unless otherwise noted.      TO THE PATIENT:  This summary is a brief record of major aspects of your cancer treatment. You may choose to share a copy with any of your doctors or nurses. However, this is not a detailed or comprehensive record of your care.

## 2024-11-12 ENCOUNTER — APPOINTMENT (OUTPATIENT)
Dept: RADIATION ONCOLOGY | Facility: CLINIC | Age: 69
End: 2024-11-12
Attending: RADIOLOGY
Payer: MEDICARE

## 2024-11-12 PROCEDURE — 77014 CHG CT GUIDANCE RADIATION THERAPY FLDS PLACEMENT: CPT | Performed by: RADIOLOGY

## 2024-11-12 PROCEDURE — 77386 HB NTSTY MODUL RAD TX DLVR CPLX: CPT | Performed by: RADIOLOGY

## 2024-11-13 ENCOUNTER — HOSPITAL ENCOUNTER (OUTPATIENT)
Dept: INFUSION CENTER | Facility: CLINIC | Age: 69
Discharge: HOME | End: 2024-11-13
Payer: MEDICARE

## 2024-11-13 ENCOUNTER — APPOINTMENT (OUTPATIENT)
Dept: RADIATION ONCOLOGY | Facility: CLINIC | Age: 69
End: 2024-11-13
Attending: RADIOLOGY
Payer: MEDICARE

## 2024-11-13 ENCOUNTER — NUTRITION (OUTPATIENT)
Dept: NUTRITION | Facility: CLINIC | Age: 69
End: 2024-11-13

## 2024-11-13 VITALS
DIASTOLIC BLOOD PRESSURE: 80 MMHG | RESPIRATION RATE: 18 BRPM | SYSTOLIC BLOOD PRESSURE: 110 MMHG | TEMPERATURE: 97.7 F | HEART RATE: 109 BPM

## 2024-11-13 DIAGNOSIS — Z17.0 MALIGNANT NEOPLASM OF UPPER-OUTER QUADRANT OF RIGHT BREAST IN FEMALE, ESTROGEN RECEPTOR POSITIVE (HCC): Primary | ICD-10-CM

## 2024-11-13 DIAGNOSIS — Z71.3 NUTRITIONAL COUNSELING: Primary | ICD-10-CM

## 2024-11-13 DIAGNOSIS — C50.411 MALIGNANT NEOPLASM OF UPPER-OUTER QUADRANT OF RIGHT BREAST IN FEMALE, ESTROGEN RECEPTOR POSITIVE (HCC): Primary | ICD-10-CM

## 2024-11-13 LAB
ALBUMIN SERPL BCG-MCNC: 3.3 G/DL (ref 3.5–5)
ALP SERPL-CCNC: 108 U/L (ref 34–104)
ALT SERPL W P-5'-P-CCNC: 15 U/L (ref 7–52)
ANION GAP SERPL CALCULATED.3IONS-SCNC: 4 MMOL/L (ref 4–13)
AST SERPL W P-5'-P-CCNC: 26 U/L (ref 13–39)
BASOPHILS # BLD AUTO: 0.02 THOUSANDS/ÂΜL (ref 0–0.1)
BASOPHILS NFR BLD AUTO: 0 % (ref 0–1)
BILIRUB SERPL-MCNC: 0.26 MG/DL (ref 0.2–1)
BUN SERPL-MCNC: 30 MG/DL (ref 5–25)
CALCIUM ALBUM COR SERPL-MCNC: 9.8 MG/DL (ref 8.3–10.1)
CALCIUM SERPL-MCNC: 9.2 MG/DL (ref 8.4–10.2)
CHLORIDE SERPL-SCNC: 104 MMOL/L (ref 96–108)
CO2 SERPL-SCNC: 31 MMOL/L (ref 21–32)
CREAT SERPL-MCNC: 1.07 MG/DL (ref 0.6–1.3)
EOSINOPHIL # BLD AUTO: 0.02 THOUSAND/ÂΜL (ref 0–0.61)
EOSINOPHIL NFR BLD AUTO: 0 % (ref 0–6)
ERYTHROCYTE [DISTWIDTH] IN BLOOD BY AUTOMATED COUNT: 17.4 % (ref 11.6–15.1)
GFR SERPL CREATININE-BSD FRML MDRD: 53 ML/MIN/1.73SQ M
GLUCOSE SERPL-MCNC: 75 MG/DL (ref 65–140)
HCT VFR BLD AUTO: 27.4 % (ref 34.8–46.1)
HGB BLD-MCNC: 8.8 G/DL (ref 11.5–15.4)
IMM GRANULOCYTES # BLD AUTO: 0.01 THOUSAND/UL (ref 0–0.2)
IMM GRANULOCYTES NFR BLD AUTO: 0 % (ref 0–2)
LYMPHOCYTES # BLD AUTO: 0.41 THOUSANDS/ÂΜL (ref 0.6–4.47)
LYMPHOCYTES NFR BLD AUTO: 9 % (ref 14–44)
MAGNESIUM SERPL-MCNC: 1.9 MG/DL (ref 1.9–2.7)
MCH RBC QN AUTO: 28.8 PG (ref 26.8–34.3)
MCHC RBC AUTO-ENTMCNC: 32.1 G/DL (ref 31.4–37.4)
MCV RBC AUTO: 90 FL (ref 82–98)
MONOCYTES # BLD AUTO: 0.85 THOUSAND/ÂΜL (ref 0.17–1.22)
MONOCYTES NFR BLD AUTO: 19 % (ref 4–12)
NEUTROPHILS # BLD AUTO: 3.23 THOUSANDS/ÂΜL (ref 1.85–7.62)
NEUTS SEG NFR BLD AUTO: 72 % (ref 43–75)
NRBC BLD AUTO-RTO: 0 /100 WBCS
PLATELET # BLD AUTO: 322 THOUSANDS/UL (ref 149–390)
PMV BLD AUTO: 10.8 FL (ref 8.9–12.7)
POTASSIUM SERPL-SCNC: 4.6 MMOL/L (ref 3.5–5.3)
PROT SERPL-MCNC: 7.5 G/DL (ref 6.4–8.4)
RBC # BLD AUTO: 3.06 MILLION/UL (ref 3.81–5.12)
SODIUM SERPL-SCNC: 139 MMOL/L (ref 135–147)
WBC # BLD AUTO: 4.54 THOUSAND/UL (ref 4.31–10.16)

## 2024-11-13 PROCEDURE — 77386 HB NTSTY MODUL RAD TX DLVR CPLX: CPT | Performed by: INTERNAL MEDICINE

## 2024-11-13 PROCEDURE — 77014 CHG CT GUIDANCE RADIATION THERAPY FLDS PLACEMENT: CPT | Performed by: INTERNAL MEDICINE

## 2024-11-13 PROCEDURE — 83735 ASSAY OF MAGNESIUM: CPT | Performed by: INTERNAL MEDICINE

## 2024-11-13 PROCEDURE — 77336 RADIATION PHYSICS CONSULT: CPT | Performed by: RADIOLOGY

## 2024-11-13 PROCEDURE — 96365 THER/PROPH/DIAG IV INF INIT: CPT

## 2024-11-13 PROCEDURE — 85025 COMPLETE CBC W/AUTO DIFF WBC: CPT | Performed by: INTERNAL MEDICINE

## 2024-11-13 PROCEDURE — 96366 THER/PROPH/DIAG IV INF ADDON: CPT

## 2024-11-13 PROCEDURE — 80053 COMPREHEN METABOLIC PANEL: CPT | Performed by: INTERNAL MEDICINE

## 2024-11-13 RX ADMIN — POTASSIUM CHLORIDE: 2 INJECTION, SOLUTION, CONCENTRATE INTRAVENOUS at 12:11

## 2024-11-13 NOTE — PROGRESS NOTES
Pt offers no new complaints today, tolerated potassium/magnesium over 2 hour hydration without complications, confirmed appt back on Friday 12:00, AVS provided.

## 2024-11-13 NOTE — PROGRESS NOTES
Outpatient Oncology Nutrition Consultation   Type of Consult: Follow Up   Care Location: Infusion Center (seen after RT, during hydration)    Reason for referral: Received notification by [facility] PEPE ZAPATA on 8/21/24 that pt has triggered for oncology nutrition care (reason for referral: HNC dx and Malnutrition Screening Tool (MST) Triggers: scored a 0 indicating No recent wt loss and is not eating poorly due to a decreased appetite. 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  - grade 2  - %  - ME 95%  - HER2 negative     12/20/2018 Surgery    Right partial mastectomy and SLN biopsy:  Infiltrating ductal carcinoma, Grade 2/3, felt to be between 2.0 cm and 2.5 cm in greatest dimension  - No invasive tumor is seen at inked margins, but there is a focus of DCIS seen within approximately 1mm of the inked medial margin  - no LVI  - no LCIS  - 0/2 LN's  at least Stage IIA - pT2, pN0, cM0, G2.    - Dr Coronado     12/20/2018 -  Cancer Staged    Stage IIA - pT2, pN0, cM0, G2.       1/4/2019 Genomic Testing    Oncotype DX score: 13     2/1/2019 -  Hormone Therapy    anastrozole 1 mg daily as adjuvant  "endocrine therapy    - Dr Daley       2/14/2019 - 4/3/2019 Radiation    Course: C1    Plan ID Energy Fractions Dose per Fraction (cGy) Dose Correction (cGy) Total Dose Delivered (cGy) Elapsed Days   R Breast 10X/6X 25 / 25 200 0 5,000 40   R BRST BOOST 16E 5 / 5 200 0 1,000 7      Treatment dates:  C1: 2/14/2019 - 4/3/2019       Metastatic colon cancer to liver (HCC)   7/6/2023 Genetic Testing    CARIS Results:   KRAS Pathogenic Variant Exon 2  p.G12D : lack of benefit of cetuximab, panitumumab Level 2   No other actionable mutation; MSI stable; TMB low   MiFOLOXAi Results: \"Decreased Benefit of FOLFOX + Bevacizumab in first line metastatic CRC.  This patient may achieve improved results by receiving an alternative to FOLFOX, such as FOLFIRI, as their initial regimen. As an adjustment to frontline FOLFOXIRI following toxicity: This patient may achieve improved results by removing the oxaliplatin portion of their regimen\".         7/11/2023 Initial Diagnosis    Metastatic colon cancer to liver (HCC)     7/13/2023 -  Cancer Staged    Staging form: Colon and Rectum, AJCC 8th Edition  - Clinical stage from 7/13/2023: cT3, cN0, pM1 - Signed by Harika Medina DO on 9/28/2023  Total positive nodes: 0       7/31/2023 - 8/1/2024 Chemotherapy    cyanocobalamin, 1,000 mcg, Intramuscular, Every 30 days, 7 of 9 cycles  Administration: 1,000 mcg (5/9/2024), 1,000 mcg (5/24/2024), 1,000 mcg (6/20/2024), 1,000 mcg (7/3/2024), 1,000 mcg (7/18/2024), 1,000 mcg (8/1/2024)  potassium chloride, 20 mEq, Intravenous, Once, 2 of 2 cycles  Administration: 20 mEq (11/6/2023), 20 mEq (11/20/2023)  alteplase (CATHFLO), 2 mg, Intracatheter, Every 1 Minute as needed, 21 of 23 cycles  Administration: 2 mg (1/3/2024)  pegfilgrastim (NEULASTA), 6 mg, Subcutaneous, Once, 12 of 12 cycles  Administration: 6 mg (10/25/2023), 6 mg (11/8/2023), 6 mg (11/22/2023), 6 mg (12/6/2023), 6 mg (12/20/2023), 6 mg (1/12/2024), 6 mg (1/25/2024), 6 mg " (4/25/2024), 6 mg (5/9/2024), 6 mg (5/24/2024), 6 mg (6/20/2024)  fluorouracil (ADRUCIL), 895 mg, Intravenous, Once, 21 of 23 cycles  Dose modification: 320 mg/m2 (original dose 400 mg/m2, Cycle 11)  Administration: 900 mg (7/31/2023), 900 mg (8/14/2023), 900 mg (8/28/2023), 900 mg (9/11/2023), 900 mg (9/25/2023), 900 mg (10/9/2023), 900 mg (10/23/2023), 895 mg (11/6/2023), 895 mg (11/20/2023), 895 mg (12/4/2023), 700 mg (12/18/2023), 680 mg (1/10/2024), 680 mg (1/23/2024), 625 mg (4/23/2024), 625 mg (5/7/2024), 625 mg (5/22/2024), 625 mg (6/4/2024), 625 mg (6/18/2024), 625 mg (7/1/2024), 625 mg (7/16/2024), 625 mg (7/30/2024)  nivolumab (OPDIVO) IVPB, 240 mg (100 % of original dose 240 mg), Intravenous, Once, 13 of 15 cycles  Dose modification: 240 mg (original dose 240 mg, Cycle 9)  Administration: 240 mg (11/20/2023), 240 mg (12/4/2023), 240 mg (12/18/2023), 240 mg (1/10/2024), 240 mg (1/23/2024), 240 mg (4/23/2024), 240 mg (5/7/2024), 240 mg (5/22/2024), 240 mg (6/4/2024), 240 mg (6/18/2024), 240 mg (7/1/2024), 240 mg (7/16/2024), 240 mg (7/30/2024)  leucovorin calcium IVPB, 896 mg, Intravenous, Once, 21 of 23 cycles  Administration: 900 mg (7/31/2023), 900 mg (8/14/2023), 900 mg (8/28/2023), 900 mg (9/11/2023), 900 mg (9/25/2023), 900 mg (10/9/2023), 900 mg (10/23/2023), 900 mg (11/6/2023), 900 mg (11/20/2023), 900 mg (12/4/2023), 900 mg (12/18/2023), 850 mg (1/10/2024), 850 mg (1/23/2024), 800 mg (4/23/2024), 800 mg (5/7/2024), 800 mg (5/22/2024), 800 mg (6/4/2024), 800 mg (6/18/2024), 800 mg (7/1/2024), 800 mg (7/16/2024), 800 mg (7/30/2024)  oxaliplatin (ELOXATIN) chemo infusion, 85 mg/m2 = 190.4 mg, Intravenous, Once, 12 of 12 cycles  Dose modification: 68 mg/m2 (original dose 85 mg/m2, Cycle 11, Reason: Other (Must fill in a comment), Comment: increased fatigue)  Administration: 190.4 mg (7/31/2023), 190.4 mg (8/14/2023), 200 mg (8/28/2023), 200 mg (9/11/2023), 200 mg (9/25/2023), 200 mg (10/9/2023), 200  mg (10/23/2023), 200 mg (11/6/2023), 200 mg (11/20/2023), 190.4 mg (12/4/2023), 150 mg (12/18/2023), 150 mg (1/10/2024)  fluorouracil (ADRUCIL) ambulatory infusion Soln, 1,200 mg/m2/day = 5,375 mg, Intravenous, Over 46 hours, 21 of 23 cycles     9/26/2023 -  Cancer Staged    Staging form: Colon and Rectum, AJCC 8th Edition  - Pathologic: pT3, pN0, cM1 - Signed by Vickie Israel MD on 4/3/2024  Total positive nodes: 0       3/12/2024 Surgery    A. Terminal ileum, appendix, right colon (right hemicolectomy):  - Adenocarcinoma (5.2cm)  - Forty-nine (49) lymph nodes negative for carcinoma (0/49)    - Acellular mucin present in one (1) lymph node   - See staging synoptic (ypT3N0)     Right tonsillar squamous cell carcinoma (HCC)   7/27/2023 Initial Diagnosis    Right tonsillar squamous cell carcinoma (HCC)     7/27/2023 -  Cancer Staged    Staging form: Pharynx - Oropharynx, AJCC 8th Edition  - Clinical stage from 7/27/2023: Stage III (cT1, cN3, cM0, p16+) - Signed by Evan Plummer MD on 7/27/2023  Stage prefix: Initial diagnosis       9/17/2024 -  Chemotherapy    alteplase (CATHFLO), 2 mg, Intracatheter, Every 1 Minute as needed, 6 of 6 cycles  Administration: 2 mg (10/21/2024)  CISplatin (PLATINOL) infusion, 70 mg (original dose 40 mg/m2), Intravenous, Once, 6 of 6 cycles  Dose modification: 70 mg (original dose 40 mg/m2, Cycle 1, Reason: Anticipated Tolerance), 30 mg/m2 (original dose 40 mg/m2, Cycle 4, Reason: Neuropathy, Comment: dose reduction to 30 mg/m2 due to neuropathy)  Administration: 70 mg (9/17/2024), 70 mg (9/23/2024), 70 mg (9/30/2024), 59.1 mg (10/7/2024), 59.1 mg (10/14/2024), 59.1 mg (10/21/2024)  sodium chloride, 500 mL, Intravenous, Once, 6 of 6 cycles  Administration: 500 mL (9/17/2024), 500 mL (9/17/2024), 500 mL (9/23/2024), 500 mL (9/23/2024), 500 mL (9/30/2024), 500 mL (9/30/2024), 500 mL (10/7/2024), 500 mL (10/7/2024), 500 mL (10/14/2024), 500 mL (10/14/2024), 500 mL  (10/21/2024)  fosaprepitant (EMEND) IVPB, 150 mg, Intravenous, Once, 6 of 6 cycles  Administration: 150 mg (9/17/2024), 150 mg (9/23/2024), 150 mg (9/30/2024), 150 mg (10/7/2024), 150 mg (10/14/2024), 150 mg (10/21/2024)  IVPB builder, , Intravenous, Once, 1 of 1 cycle  Administration: Unknown dose (10/21/2024)       Past Medical & Surgical Hx:   Patient Active Problem List   Diagnosis    Primary hypertension    Mixed hyperlipidemia    Malignant neoplasm of right female breast (HCC)    Vitamin D deficiency    Prediabetes    Right thyroid nodule    GERD (gastroesophageal reflux disease)    Obesity    Metastatic colon cancer to liver (HCC)    Right tonsillar squamous cell carcinoma (HCC)    Poor appetite    Nutritional anemia    Dehydration    Drug-induced constipation    Chemotherapy induced neutropenia (HCC)    Stage 3a chronic kidney disease (HCC)    Thrombocytopenia (HCC)    Neuropathy    Diastolic dysfunction    Hypokalemia    Leukemoid reaction    GOGO (acute kidney injury) (HCC)    Acute post-operative pain    At risk for constipation    At risk for delirium    Palliative care encounter    Physical deconditioning    History of CVA (cerebrovascular accident)    Chronic nasal congestion    Elevated LFTs    Vitamin B12 deficiency    Neoplastic malignant related fatigue    Sensation, choking    S/P percutaneous endoscopic gastrostomy (PEG) tube placement (HCC)    Pancytopenia due to chemotherapy (HCC)     Past Medical History:   Diagnosis Date    Anemia     Arthritis     Body mass index (BMI) 40.0-44.9, adult (HCC)     Breast cancer (HCC) 12/17/2018    Cancer (HCC)     right breast, colon, liver, right tonsil    Cervical lymphadenopathy     CT neck on 3/30/2023- Large right level 2A lymphadenopathy as described above and suspicious for neoplasm.  Correlation with histopathology is recommended.  Mild asymmetric prominence of the right palatine tonsil with otherwise no definitive nodular enhancing lesions identified  along the course of the aerodigestive tract.     23- FNA of this node was nonrevealing for tissue etiology, but it d    Encounter for screening for HIV 2022    Follow-up examination 2023    GERD (gastroesophageal reflux disease)     Hyperlipidemia     Hypertension     Obesity     Stroke (HCC)     TIA     Stroke (HCC)     TIA     Transaminitis 2021    Vasomotor rhinitis     Refilled flonase today. Stopped taking since 2022     Past Surgical History:   Procedure Laterality Date    BREAST BIOPSY Right 2018    us guided bx cancer    BREAST SURGERY      COLONOSCOPY      ESOPHAGOSCOPY N/A 2023    Procedure: ESOPHAGOSCOPY;  Surgeon: David Chapa MD;  [facility];  Service: ENT    HEMICOLOECTOMY W/ ANASTOMOSIS Right 3/12/2024    Procedure: RIGHT COLECTOMY WITH ROBOTICS;  Surgeon: Vickie Israel MD;  [facility];  Service: Surgical Oncology    IR BIOPSY LIVER MASS  2023    IR CRYOABLATION  6/3/2024    IR GASTROSTOMY TUBE PLACEMENT  10/2/2024    IR PORT CHECK  2024    IR PORT PLACEMENT  2023    IR PORT STRIPPING  2024    LAPAROTOMY N/A 3/12/2024    Procedure: LAPAROTOMY EXPLORATORY W/ ROBOTICS;  Surgeon: Vickie Irsael MD;  [facility];  Service: Surgical Oncology    TN BIOPSY NASOPHARYNX VISIBLE LESION SIMPLE N/A 10/9/2024    Procedure: NASOPHARYNGOSCOPY with biospy;  Surgeon: Juanito Matthew MD;  [facility];  Service: ENT    TN BRNCHSC INCL FLUOR GDNCE DX W/CELL WASHG SPX N/A 2023    Procedure: BRONCHOSCOPY;  Surgeon: David Chapa MD;  [facility];  Service: ENT    TN LARYNGOSCOPY W/BIOPSY MICROSCOPE/TELESCOPE N/A 2023    Procedure: MICRODIRECT LARYNGOSCOPY WITH BIOPSY;  Surgeon: David Chapa MD;  [facility];  Service: ENT    TN LARYNGOSCOPY W/WO TRACHEOSCOPY DX EXCEPT  N/A 10/9/2024    Procedure: DIRECT LARYNGOSCOPY;  Surgeon: Juanito Matthew MD;  [facility];  Service: ENT    TN MASTECTOMY PARTIAL W/AXILLARY LYMPHADENECTOMY Right 12/20/2018    Procedure: ULTRASOUND LOCALIZED PARTIAL MASTECTOMY W/SENTINEL NODE BIOPSY POSS AXILLARY DISSECTION;  Surgeon: Zahida Coronado MD;  Location: [facility];  Service: General    TN TONSILLECTOMY PRIMARY/SECONDARY AGE 13/> N/A 10/9/2024    Procedure: TONSILLECTOMY;  Surgeon: Juanito Matthew MD;  [facility];  Service: ENT    TUBAL LIGATION      US GUIDED BREAST BIOPSY RIGHT COMPLETE Right 11/23/2018    US GUIDED INJECTION FOR RESEARCH STUDY  12/20/2018    US GUIDED INJECTION FOR RESEARCH STUDY  12/07/2018    US GUIDED INJECTION FOR RESEARCH STUDY  05/03/2019    US GUIDED LYMPH NODE BIOPSY RIGHT  05/26/2023       Review of Medications:   Vitamins, Supplements and Herbals: No, pt denies taking supplements    Current Outpatient Medications:     acetaminophen (TYLENOL) 160 mg/5 mL liquid, Take 20 mL (640 mg total) by mouth every 6 (six) hours as needed for mild pain for up to 10 days, Disp: 473 mL, Rfl: 0    amLODIPine (NORVASC) 5 mg tablet, 1 tablet (5 mg total) by Per G Tube route daily, Disp: 30 tablet, Rfl: 0    anastrozole (ARIMIDEX) 1 mg tablet, Take 1 tablet (1 mg total) by mouth daily, Disp: 90 tablet, Rfl: 3    atorvastatin (LIPITOR) 40 mg tablet, Take 1 tablet (40 mg total) by mouth daily at bedtime, Disp: 30 tablet, Rfl: 2    diphenhydramine, lidocaine, Al/Mg hydroxide, simethicone (Magic Mouthwash) SUSP, Swish and swallow 10 mL every 4 (four) hours as needed for mouth pain or discomfort, Disp: 480 mL, Rfl: 1    docusate sodium (COLACE) 50 mg capsule, Take 1 capsule (50 mg total) by mouth 2 (two) times a day, Disp: 90 capsule, Rfl: 1    ergocalciferol (VITAMIN D2) 50,000 units, Take 1 capsule (50,000 Units total) by mouth once a week, Disp: 90 capsule, Rfl: 3    folic acid (FOLVITE) 1 mg tablet, Take 1 tablet (1 mg total) by mouth in the morning, Disp: 90 tablet, Rfl: 3    gabapentin (NEURONTIN) 300 mg  capsule, Take 1 pills in the morning, 1 pill at lunch and 2 pills before bed, Disp: 120 capsule, Rfl: 3    hydrocortisone 1 % ointment, , Disp: , Rfl:     ibuprofen (MOTRIN) 100 mg/5 mL suspension, Take 20 mL (400 mg total) by mouth every 6 (six) hours as needed for moderate pain, Disp: 473 mL, Rfl: 0    lidocaine-prilocaine (EMLA) cream, Apply topically as needed for mild pain, Disp: 30 g, Rfl: 3    losartan (COZAAR) 50 mg tablet, Take 1 tablet (50 mg total) by mouth daily, Disp: 90 tablet, Rfl: 5    magnesium Oxide (MAG-OX) 400 mg TABS, 1 tablet (400 mg total) by Per G Tube route 2 (two) times a day, Disp: 60 tablet, Rfl: 0    mirtazapine (REMERON) 15 mg tablet, Take 1 tablet (15 mg total) by mouth daily at bedtime Patient is also on a 30 mg tablet, for a total dose of 45 mg, Disp: 30 tablet, Rfl: 3    mirtazapine (REMERON) 30 mg tablet, Take 1 tablet (30 mg total) by mouth daily at bedtime, Disp: 30 tablet, Rfl: 3    omeprazole (PriLOSEC) 20 mg delayed release capsule, Take 1 capsule (20 mg total) by mouth daily, Disp: 30 capsule, Rfl: 5    ondansetron (ZOFRAN) 8 mg tablet, Take 1 tablet (8 mg total) by mouth every 8 (eight) hours as needed for nausea or vomiting, Disp: 90 tablet, Rfl: 2    oxyCODONE (ROXICODONE) 5 mg/5 mL solution, Take 5 mL (5 mg total) by mouth every 6 (six) hours as needed for severe pain ((breakthrough pain)) Max Daily Amount: 20 mg, Disp: 473 mL, Rfl: 0    potassium chloride (Klor-Con M20) 20 mEq tablet, Take 1 tablet (20 mEq total) by mouth 2 (two) times a day, Disp: 90 tablet, Rfl: 1    potassium chloride 10% oral solution, 15 mL (20 mEq total) by Per G Tube route 2 (two) times a day, Disp: 473 mL, Rfl: 0    prochlorperazine (COMPAZINE) 10 mg tablet, Take 1 tablet (10 mg total) by mouth every 6 (six) hours as needed for nausea or vomiting, Disp: 90 tablet, Rfl: 2    pyridoxine (VITAMIN B6) 50 mg tablet, Take 1 tablet (50 mg total) by mouth daily, Disp: 90 tablet, Rfl: 3    vitamin B-12  "(VITAMIN B-12) 1,000 mcg tablet, , Disp: , Rfl:   No current facility-administered medications for this visit.    Facility-Administered Medications Ordered in Other Visits:     alteplase (CATHFLO) injection 2 mg, 2 mg, Intracatheter, Q1MIN PRN, Harika Agostino, DO    potassium chloride 20 mEq, magnesium sulfate 2 g in sodium chloride 0.9 % 1,000 mL IV, , Intravenous, Once, Harika Agostino, DO, Last Rate: 500 mL/hr at 11/13/24 1211, New Bag at 11/13/24 1211    Most Recent Lab Results:   Lab Results   Component Value Date    WBC 3.77 (L) 11/11/2024    NEUTROABS 2.41 11/11/2024    IRON 60 11/01/2024    TIBC 177 (L) 11/01/2024    FERRITIN 533 (H) 11/01/2024    CHOLESTEROL 167 04/24/2024    TRIG 137 04/24/2024    HDL 43 (L) 04/24/2024    LDLCALC 97 04/24/2024    ALT 17 11/11/2024    AST 26 11/11/2024    ALB 3.2 (L) 11/11/2024    SODIUM 137 11/11/2024    SODIUM 140 11/08/2024    K 4.3 11/11/2024    K 4.0 11/08/2024     11/11/2024    BUN 30 (H) 11/11/2024    BUN 30 (H) 11/08/2024    CREATININE 1.23 11/11/2024    CREATININE 1.03 11/08/2024    EGFR 45 11/11/2024    PHOS 2.8 11/01/2024    PHOS 3.3 10/30/2024    TSH 1.58 01/03/2022    GLUCOSE 209 (H) 03/12/2024    POCGLU 101 08/05/2024    GLUF 102 (H) 10/30/2024    GLUF 95 09/13/2024    GLUC 91 11/11/2024    HGBA1C 5.5 02/21/2024    HGBA1C 5.9 (H) 06/13/2023    HGBA1C 6.1 (H) 07/09/2022    CALCIUM 9.3 11/11/2024    FOLATE >20.0 (H) 05/23/2019    MG 1.8 (L) 11/11/2024       Anthropometric Measurements:   Height: 66\"  Ht Readings from Last 1 Encounters:   10/29/24 5' 5.98\" (1.676 m)     Wt Readings from Last 20 Encounters:   10/29/24 86.2 kg (190 lb)   10/24/24 86.6 kg (190 lb 14.7 oz)   10/21/24 86.6 kg (190 lb 14.7 oz)   10/15/24 88.5 kg (195 lb)   10/14/24 87.1 kg (192 lb)   10/10/24 89.3 kg (196 lb 13.9 oz)   10/09/24 89.4 kg (197 lb 1.5 oz)   10/07/24 87 kg (191 lb 12.8 oz)   10/02/24 88.9 kg (195 lb 15.8 oz)   10/01/24 89.4 kg (197 lb)   09/30/24 88 kg (194 lb 0.1 " oz)   09/23/24 88.2 kg (194 lb 7.1 oz)   09/17/24 90 kg (198 lb 6.6 oz)   09/13/24 88.5 kg (195 lb)   09/10/24 89.8 kg (198 lb)   09/03/24 89.8 kg (198 lb)   08/20/24 92.2 kg (203 lb 3.2 oz)   08/16/24 90.3 kg (199 lb)   08/06/24 88.9 kg (196 lb)   07/30/24 87.8 kg (193 lb 9 oz)       Weight History:   Usual Weight: 275#  Varian: (2/22/19) 264.6#, (4/2/19) 263.4, (9/16/24) 197.4#, (9/23/24) 194#, (9/30/24) 192.8#, (10/7/24) 190.2#, (10/11/24) 194.6#, (10/14/24) 191#, (10/17/24) 192.8#, (10/21/24) 190#, (10/24/24) 190#, (10/28/24) 189#, (11/11/24) 185#  Home Scale: n/a    Oncology Nutrition-Anthropometrics      Flowsheet Row Nutrition from 11/13/2024 in [facility] Nutrition from 11/5/2024 in [facility]   Patient [age] (years): 69 years 69 years   Patient (female) height (in): 66 in 66 in   Current Weight to be used for anthropometric calculations (lbs) 185 lbs 190 lbs   Current Weight to be used for anthropometric calculations (kg) 84.1 kg 86.4 kg   BMI: 29.9 30.7   IBW female: 130 lbs 130 lbs   IBW (kg) female: 59.1 kg 59.1 kg   IBW % (female) 142.3 % 146.2 %   Adjusted BW (female): 143.8 lbs 145 lbs   Adjusted BW kg (female): 65.4 kg 65.9 kg   % weight change after 1 week: -- -0.5 %   Weight change after 1 week (lbs) -- -0.9 lbs   % weight change after 1 month: -4.9 % -2.1 %   Weight change after 1 month (lbs) -9.6 lbs -4.1 lbs   % weight change after 3 months: -7 % -1.9 %   Weight change after 3 months (lbs) -14 lbs -3.6 lbs            Nutrition-Focused Physical Findings: none observed    Food/Nutrition-Related History & Client/Social History:    Current Nutrition Impact Symptoms:  [] Nausea  [x] Reduced Appetite  [] Acid Reflux    [] Vomiting  [x] Unintended Wt Loss [] Malabsorption    [] Diarrhea -resolved [] Unintended Wt Gain  [] Dumping Syndrome    [] Constipation  [] Thick Mucous/Secretions  [] Abdominal Pain    [] Dysgeusia  (Altered Taste)  [x] Xerostomia (Dry Mouth)  [] Gas    [] Dysosmia (Altered Smell)  [] Thrush  [] Difficulty Chewing    [] Oral Mucositis (Sore Mouth)  [x] Fatigue  [] Hyperglycemia   Lab Results   Component Value Date    HGBA1C 5.5 2024      [x] Odynophagia  [] Esophagitis  [] Other:    [x] Dysphagia -enteral nutrition as 1' source of nutrition  Was upgraded to pureed foods per SLP while inpatient [] Early Satiety  [] No Problems Eating      Food Allergies & Intolerances: no    Current Diet: Tube Feeding, pureed, some soft foods  Current Nutrition Intake: Unchanged from last visit  Appetite: Fair   Nutrition Route: PEG, some PO  Oral Care: brushes daily  Activity level: ADLs.     24 Hr Diet Recall:   Ate mac and cheese last night    Beverages: water (sips throughout the day)  Supplements:   None    EN Recall:  DME: [employer]  Access Type: PEG  Formula: Jevity 1.5  Method: Bolus  Regimen: 16oz (474 mL) 3 times per day of Jevity 1.5 via PEG (gravity syringe method to administer boluses; 1 container infusing over ~15-20 minutes).  Flush: 60 mL free water flush  pre/post each bolus (120 mL x 3 boluses = 360 mL)  EN providin mL volume (6 cartons/day), 2133 kcal (100% est needs), 91g protein (100% est needs), 1081 mL free water, 1441 mL total water (72% est needs).      Oncology Nutrition-Estimated Needs      Flowsheet Row Nutrition from 2024 in [facility] Nutrition from 2024 in [facility]   Weight type used Adjusted weight Actual weight   Weight in kilograms (kg) used for estimated needs 66.4 kg 90 kg   Energy needs formula:  30-35 kcal/kg 30-35 kcal/kg   Energy needs based on 30 kcal/k kcal 2700 kcal   Energy needs based on 35 kcal/k kcal 3150 kcal   Protein needs formula: 1.2-1.5 g/kg 1.2-1.5 g/kg   Protein needs based on 1.2 g/k g 108 g   Protein needs based on 1.5 g/kg 100 g 135 g   Fluid needs  formula: 30-35 mL/kg 30-35 mL/kg   Fluid needs based on 30 mL/kg 1992 mL 2700 mL   Fluid needs in ounces 67 oz 91 oz   Fluid needs based on 35 mL/kg 2324 mL 3150 mL   Fluid needs in ounces 79 oz 106 oz             Discussion & Intervention:   Maira was evaluated today for an RD follow up regarding HNC.  Maira is currently undergoing tx for HNC .  Met with Maira today after her RT while she was getting hydration. Today was her last day of RT. She will continue IV hydration. She reports doing well with her nutrition- continues to get in 6 cartons of Jevity 1.5 daily. She is trying foods PO daily- yesterday had a small amount of mac and cheese. Weight down about 5# over the last 2 weeks. We discussed continuing with her TF plan. Once her PO intake starts to improve, we can discuss TF weaning.    Reviewed 24 hour recall, which revealed an adequate enteral intake, and discussed ways to optimize nutrient intake.  Also reviewed the importance of wt management throughout the tx process and the role of enteral nutrition in managing wt and overall health.  Based on today's assessment, discussion included: MNT for:  xerostomia, fluid, pureed diet, soft/moist foods, enteral nutrition, practicing proper oral care, adequate hydration & fluid choices, practicing proper feeding tube use & care, adherence to and compliance with enteral nutrition plan of care, and individualized enteral nutrition recommendations & plan:   .   Moving forward, Maira was encouraged to continue current enteral nutrition plan of care   Materials Provided: not applicable  All questions and concerns addressed during today’s visit.  Maira has RD contact information.    Nutrition Diagnosis:   Increased Nutrient Needs (kcal & pro) related to increased demand for nutrients and disease state as evidenced by cancer dx and pt undergoing tx for cancer.  Swallowing Difficulty related to diagnosis/treatment related causes as evidenced by RT tx, need  for feeding tube.  Monitoring & Evaluation:   Goals:  weight maintenance/stabilization  adequate nutrition impact symptom management  pt to meet >/=75% estimated nutrition needs daily  achieve tube feeding goal rate    Progress Towards Goals: Progressing and Partially Met    Nutrition Rx & Recommendations:  Diet: enteral nutrition as 1' source of nutrition  Stay hydrated by sipping fluids of choice/tolerance throughout the day.    Follow proper oral care; Try baking soda/salt water rinse recipe (mix 3/4 tsp salt + 1 tsp baking soda + 1 qt water; rinse with plain water after using) in Eating Hints book (pg 18).  Brush your teeth before/after meals & before bed.  Weigh yourself regularly. If you notice weight loss, make an effort to increase your daily food/calorie intake. If you continue to notice loss after these efforts, reach out to your dietitian to establish a plan to stabilize weight.   Pureed foods as able  Pureed Foods:  To puree foods: placed chopped food into  or . Add enough liquid to cover the blade. Blend until food is smooth and free of chunks.  You may add non-fat milk, clear broths or non-fat gravies when blending foods.  You can use baby food as long as you have a pureed protein source as the main part of your meal.  Avoid items with added sugars (read food labels).  Ideas for pureed foods: yogurt, cottage cheese, jello, pureed soups, applesauce, pureed fruit, baby food vegetables/pureed vegetables, mashed potatoes, oatmeal or cream of wheat (make with milk for more calories/protein)  If able to consume soft/moist foods, recommend : Soft/easy to swallow foods that are high in calories and protein: casseroles, chicken/egg/tuna salad with extra garcia to add calories and moisture, oatmeal/cream of wheat made with whole milk, cottage cheese, whole milk Greek yogurt, scrambled eggs with cheese, avocado, macaroni and cheese, mashed potatoes made with butter and whole milk or dry milk  powder, ground meat with extra sauce/gravy, canned fruit, peanut butter, cream soups (cream of chicken, cream of mushroom, broccoli cheddar), pudding made with whole milk, custard, ice cream, banana, milkshakes/smoothies, Review page 47 of Eating Hints Book for more ideas.     TF plan - continue to aim for 6 cartons/day  TF Goal: 2 containers (474 mL) 3 times daily of Jevity 1.5  Water Flushin mL free water flush pre/post each bolus (360 mL water) + additional 150 mL water flushes 4 times daily (600 mL water).   TF provides: 1422 mL total volume (6 cartons), 2133 kcal, 91g protein, 1081 mL free water, 2041 mL total fluid  Allow for substitutions  Your syringes are each 60 mL or 2 fl oz.  Always flush your feeding tube with 60 mL room-temp water 1-2 times daily while feeding tube is not in use.  [facility] days [facility] supplies: [phone], option [phone] (customer support).      Follow Up Plan:  24 phone follow up at 11 am  Recommend Referral to Other Providers: none at this time

## 2024-11-13 NOTE — PATIENT INSTRUCTIONS
Nutrition Rx & Recommendations:  Diet: enteral nutrition as 1' source of nutrition  Stay hydrated by sipping fluids of choice/tolerance throughout the day.    Follow proper oral care; Try baking soda/salt water rinse recipe (mix 3/4 tsp salt + 1 tsp baking soda + 1 qt water; rinse with plain water after using) in Eating Hints book (pg 18).  Brush your teeth before/after meals & before bed.  Weigh yourself regularly. If you notice weight loss, make an effort to increase your daily food/calorie intake. If you continue to notice loss after these efforts, reach out to your dietitian to establish a plan to stabilize weight.   Pureed foods as able  Pureed Foods:  To puree foods: placed chopped food into  or . Add enough liquid to cover the blade. Blend until food is smooth and free of chunks.  You may add non-fat milk, clear broths or non-fat gravies when blending foods.  You can use baby food as long as you have a pureed protein source as the main part of your meal.  Avoid items with added sugars (read food labels).  Ideas for pureed foods: yogurt, cottage cheese, jello, pureed soups, applesauce, pureed fruit, baby food vegetables/pureed vegetables, mashed potatoes, oatmeal or cream of wheat (make with milk for more calories/protein)  If able to consume soft/moist foods, recommend : Soft/easy to swallow foods that are high in calories and protein: casseroles, chicken/egg/tuna salad with extra garcia to add calories and moisture, oatmeal/cream of wheat made with whole milk, cottage cheese, whole milk Greek yogurt, scrambled eggs with cheese, avocado, macaroni and cheese, mashed potatoes made with butter and whole milk or dry milk powder, ground meat with extra sauce/gravy, canned fruit, peanut butter, cream soups (cream of chicken, cream of mushroom, broccoli cheddar), pudding made with whole milk, custard, ice cream, banana, milkshakes/smoothies, Review page 47 of Eating Hints Book for more ideas.      TF plan - continue to aim for 6 cartons/day  TF Goal: 2 containers (474 mL) 3 times daily of Jevity 1.5  Water Flushin mL free water flush pre/post each bolus (360 mL water) + additional 150 mL water flushes 4 times daily (600 mL water).   TF provides: 1422 mL total volume (6 cartons), 2133 kcal, 91g protein, 1081 mL free water, 2041 mL total fluid  Allow for substitutions  Your syringes are each 60 mL or 2 fl oz.  Always flush your feeding tube with 60 mL room-temp water 1-2 times daily while feeding tube is not in use.  Call [facility] when you have 10 days left of TF supplies: [phone], option 3 (customer support).      Follow Up Plan: 24 phone follow up at 11 am  Recommend Referral to Other Providers: none at this time

## 2024-11-15 ENCOUNTER — PATIENT OUTREACH (OUTPATIENT)
Dept: HEMATOLOGY ONCOLOGY | Facility: CLINIC | Age: 69
End: 2024-11-15

## 2024-11-15 ENCOUNTER — HOSPITAL ENCOUNTER (OUTPATIENT)
Dept: INFUSION CENTER | Facility: CLINIC | Age: 69
End: 2024-11-15
Payer: MEDICARE

## 2024-11-15 VITALS
TEMPERATURE: 97.8 F | HEART RATE: 111 BPM | SYSTOLIC BLOOD PRESSURE: 123 MMHG | DIASTOLIC BLOOD PRESSURE: 83 MMHG | RESPIRATION RATE: 18 BRPM

## 2024-11-15 DIAGNOSIS — Z17.0 MALIGNANT NEOPLASM OF UPPER-OUTER QUADRANT OF RIGHT BREAST IN FEMALE, ESTROGEN RECEPTOR POSITIVE (HCC): Primary | ICD-10-CM

## 2024-11-15 DIAGNOSIS — C50.411 MALIGNANT NEOPLASM OF UPPER-OUTER QUADRANT OF RIGHT BREAST IN FEMALE, ESTROGEN RECEPTOR POSITIVE (HCC): Primary | ICD-10-CM

## 2024-11-15 LAB
ALBUMIN SERPL BCG-MCNC: 3.1 G/DL (ref 3.5–5)
ALP SERPL-CCNC: 111 U/L (ref 34–104)
ALT SERPL W P-5'-P-CCNC: 15 U/L (ref 7–52)
ANION GAP SERPL CALCULATED.3IONS-SCNC: 6 MMOL/L (ref 4–13)
AST SERPL W P-5'-P-CCNC: 28 U/L (ref 13–39)
BASOPHILS # BLD AUTO: 0.01 THOUSANDS/ÂΜL (ref 0–0.1)
BASOPHILS NFR BLD AUTO: 0 % (ref 0–1)
BILIRUB SERPL-MCNC: 0.24 MG/DL (ref 0.2–1)
BUN SERPL-MCNC: 31 MG/DL (ref 5–25)
CALCIUM ALBUM COR SERPL-MCNC: 10 MG/DL (ref 8.3–10.1)
CALCIUM SERPL-MCNC: 9.3 MG/DL (ref 8.4–10.2)
CHLORIDE SERPL-SCNC: 106 MMOL/L (ref 96–108)
CO2 SERPL-SCNC: 29 MMOL/L (ref 21–32)
CREAT SERPL-MCNC: 1.25 MG/DL (ref 0.6–1.3)
EOSINOPHIL # BLD AUTO: 0.01 THOUSAND/ÂΜL (ref 0–0.61)
EOSINOPHIL NFR BLD AUTO: 0 % (ref 0–6)
ERYTHROCYTE [DISTWIDTH] IN BLOOD BY AUTOMATED COUNT: 17.6 % (ref 11.6–15.1)
GFR SERPL CREATININE-BSD FRML MDRD: 44 ML/MIN/1.73SQ M
GLUCOSE SERPL-MCNC: 68 MG/DL (ref 65–140)
HCT VFR BLD AUTO: 26.9 % (ref 34.8–46.1)
HGB BLD-MCNC: 8.9 G/DL (ref 11.5–15.4)
IMM GRANULOCYTES # BLD AUTO: 0.03 THOUSAND/UL (ref 0–0.2)
IMM GRANULOCYTES NFR BLD AUTO: 1 % (ref 0–2)
LYMPHOCYTES # BLD AUTO: 0.47 THOUSANDS/ÂΜL (ref 0.6–4.47)
LYMPHOCYTES NFR BLD AUTO: 9 % (ref 14–44)
MAGNESIUM SERPL-MCNC: 1.9 MG/DL (ref 1.9–2.7)
MCH RBC QN AUTO: 29.8 PG (ref 26.8–34.3)
MCHC RBC AUTO-ENTMCNC: 33.1 G/DL (ref 31.4–37.4)
MCV RBC AUTO: 90 FL (ref 82–98)
MONOCYTES # BLD AUTO: 0.92 THOUSAND/ÂΜL (ref 0.17–1.22)
MONOCYTES NFR BLD AUTO: 17 % (ref 4–12)
NEUTROPHILS # BLD AUTO: 3.96 THOUSANDS/ÂΜL (ref 1.85–7.62)
NEUTS SEG NFR BLD AUTO: 73 % (ref 43–75)
NRBC BLD AUTO-RTO: 0 /100 WBCS
PLATELET # BLD AUTO: 323 THOUSANDS/UL (ref 149–390)
PMV BLD AUTO: 10.7 FL (ref 8.9–12.7)
POTASSIUM SERPL-SCNC: 4.5 MMOL/L (ref 3.5–5.3)
PROT SERPL-MCNC: 7.5 G/DL (ref 6.4–8.4)
RBC # BLD AUTO: 2.99 MILLION/UL (ref 3.81–5.12)
SODIUM SERPL-SCNC: 141 MMOL/L (ref 135–147)
WBC # BLD AUTO: 5.4 THOUSAND/UL (ref 4.31–10.16)

## 2024-11-15 PROCEDURE — 85025 COMPLETE CBC W/AUTO DIFF WBC: CPT | Performed by: INTERNAL MEDICINE

## 2024-11-15 PROCEDURE — 80053 COMPREHEN METABOLIC PANEL: CPT | Performed by: INTERNAL MEDICINE

## 2024-11-15 PROCEDURE — 83735 ASSAY OF MAGNESIUM: CPT | Performed by: INTERNAL MEDICINE

## 2024-11-15 RX ADMIN — MAGNESIUM SULFATE HEPTAHYDRATE: 500 INJECTION, SOLUTION INTRAMUSCULAR; INTRAVENOUS at 12:49

## 2024-11-15 NOTE — PROGRESS NOTES
Patients potassium was 4.6. Confirmed with Hanane Diehl RN that patient should not have more K+ in her hydration today. Central labs were drawn.

## 2024-11-15 NOTE — PLAN OF CARE
Problem: INFECTION - ADULT  Goal: Absence or prevention of progression during hospitalization  Description: INTERVENTIONS:  - Assess and monitor for signs and symptoms of infection  - Monitor lab/diagnostic results  - Monitor all insertion sites, i.e. indwelling lines, tubes, and drains  - Monitor endotracheal if appropriate and nasal secretions for changes in amount and color  - [place] appropriate cooling/warming therapies per order  - Administer medications as ordered  - Instruct and encourage patient and family to use good hand hygiene technique  - Identify and instruct in appropriate isolation precautions for identified infection/condition  Outcome: Progressing

## 2024-11-15 NOTE — PROGRESS NOTES
Pt called to confirm she had transportation scheduled for her infusion appointment today. I confirmed via round trip that she is scheduled for transportation today. She was thankful for the assistance

## 2024-11-15 NOTE — PROGRESS NOTES
Critical Care Internal Medicine Intervent Radiology Maira Moura  tolerated Hydration + Mag well with no complications.      Maira Moura is aware of future appt on Monday Nov 18, 2024 10:00 AM.     AVS declined by Maira Moura.   Hospitalist Internal Medicine Internal Medicine

## 2024-11-19 ENCOUNTER — TELEMEDICINE (OUTPATIENT)
Dept: PALLIATIVE MEDICINE | Facility: CLINIC | Age: 69
End: 2024-11-19
Payer: MEDICARE

## 2024-11-19 ENCOUNTER — TELEPHONE (OUTPATIENT)
Dept: PALLIATIVE MEDICINE | Facility: CLINIC | Age: 69
End: 2024-11-19

## 2024-11-19 DIAGNOSIS — C18.9 METASTATIC COLON CANCER TO LIVER (HCC): ICD-10-CM

## 2024-11-19 DIAGNOSIS — C09.9 RIGHT TONSILLAR SQUAMOUS CELL CARCINOMA (HCC): Primary | ICD-10-CM

## 2024-11-19 DIAGNOSIS — G89.3 CANCER RELATED PAIN: ICD-10-CM

## 2024-11-19 DIAGNOSIS — C78.7 METASTATIC COLON CANCER TO LIVER (HCC): ICD-10-CM

## 2024-11-19 PROCEDURE — 99214 OFFICE O/P EST MOD 30 MIN: CPT | Performed by: INTERNAL MEDICINE

## 2024-11-19 PROCEDURE — G2211 COMPLEX E/M VISIT ADD ON: HCPCS | Performed by: INTERNAL MEDICINE

## 2024-11-19 NOTE — PROGRESS NOTES
Name: Maira Moura      : 1955      MRN: [id number]  Encounter Provider: Ariadne Fontana MD  Encounter Date: 2024   Encounter department: [facility] PALLIATIVE CARE [place]  :  Assessment & Plan  Metastatic colon cancer to liver (HCC)    Orders:    Ambulatory Referral to Palliative Care    Right tonsillar squamous cell carcinoma (HCC)  S/p IR PEG on 10/1/2024 - meds and most nutrition go to the tube  She said she can swallow Ensure with no issues  She reports completion of concurrent chemoRT  Will discuss next steps in care with onc next week       Cancer related pain  In the neck/throat area, well controlled and tolerated with the ff -   Continue tylenol 160mg/5mL, 20mL (640mg) q6H prn - taking only twice a day. Max of 3000mg in 24H  Continue oxycodone 5mg/5mL, 5mL q6H prn - taking only twice a day  No issues with OIC, she gets more diarrhea with the Jevity TF       Follow up   RTO in 6-8 weeks, call sooner to be seen sooner in the office if needed    Decisional apparatus: Patient is competent on my exam today. If competence is lost, patient's substitute decision maker would default to [place] by [place] Act 169.     PDMP Review: I have reviewed the patient's controlled substance dispensing history in the Prescription Drug Monitoring Program in compliance with the [facility] regulations before prescribing any controlled substances.    10/21/2024 10/21/2024 1 Oxycodone Hcl 5 Mg/5 Ml Soln 473.00 23 Ni Ago [id number] S h (1081) 0 30.85 [place]ME [place]   10/09/2024 10/09/2024 1 Oxycodone Hcl 5 Mg/5 Ml Soln 100.00 5 Da Yen [id number] St (5361) 0 30.00 [place]   03/15/2024 03/15/2024 2 Oxycodone Hcl (Ir) 5 Mg Tablet 8.00 4 Ja O\' [id number] Hea (8983) 0 15.00 MME [place]       History of Present Illness   Maira Moura is a 69 y.o. 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 She is now s/p chemo and RT.     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 Her case was presented at the Multidisciplinary Head and Neck Tumor Board on August 12th, 2024, which culminated in consensus for re-evaluation by Otolaryngology and Radiation Oncology. Patient was recommended definitive chemoradiation. She has was initiated on concurrently chemoradiation with weekly Cisplatin (Dose-reduced from Cisplatin 40 mg/m2 to 30 mg/m2 beginning with Cycle 4, due to poor-tolerance), beginning on September 17th, 2024 (Cycle 1). Patient has since completed six-cycles, thus far (Cycle 6 Day 1 was administered on October 21st, 2024). Cycle 7 of Cisplatin was cancelled, given poor-tolerance of the above-mentioned. Patient is s/p completion of radiation November 6th, 2024. Of note, patient's Oncologic course has been complicated by interval tonsillectomy on October 9th, 2024, resulting in intractable pharyngeal pain in the setting of ongoing chemoradiation. She is s/p IR PEG on 10/1/2024. She was prescribed Oxycodone 5 mg Per Tube Q6HR PRN; of which has not appropriately managed the above-mentioned.     Regarding early-stage hormone-positive right breast cancer, patient is status-post right lumpectomy with sentinel lymph node biopsy on December 20th, 2018, followed by adjuvant radiation (Completed in April 2019). She remains on adjuvant endocrine-therapy with Anastrozole, since February 2019.     Additionally, patient was diagnosed with de naveed metastatic adenocarcinoma of the mid-ascending colon with biopsy-proven oligometastatic disease to the liver in  July 2023. Patient is status-post systemic-chemoimmunotherapy, followed by right hemicolectomy due to partial-bowel obstruction in March 2024, followed by resumption of systemic-therapy as well as cryoablation of hepatic metastasis in June 2024. Additional systemic-therapy for colon cancer was held in the absence of residual disease; and in favoring definitive treatment of right tonsillar cancer.       She is seen today on video for post hospital dc. She was last seen in the hospital in 10/2024. She reports doing well. She requested a virtual visit today given episodes of diarrhea from her Jevity. She's gone to the bathroom 4x already today. She plans on calling her nutritionist to discuss this further. She continues to have cancer related pain in the neck area, relieved with 2 times daily dosing of oxycodone and tylenol. She is comfortable with this regimen. She is awaiting follow up with onc next week to discuss further cares. No other issues.     Social History     Tobacco Use    Smoking status: Never     Passive exposure: Never    Smokeless tobacco: Never    Tobacco comments:     NO TOBACCO USE   Vaping Use    Vaping status: Never Used   Substance and Sexual Activity    Alcohol use: Never    Drug use: Never    Sexual activity: Not on file        Objective   There were no vitals taken for this visit.    Physical Exam  Constitutional:       General: She is not in acute distress.     Appearance: Normal appearance. She is ill-appearing. She is not toxic-appearing or diaphoretic.      Comments: Comfortable, pleasant, stable. Voice is hoarse but intelligible   HENT:      Head: Normocephalic and atraumatic.   Eyes:      General: No scleral icterus.        Right eye: No discharge.         Left eye: No discharge.      Conjunctiva/sclera: Conjunctivae normal.   Pulmonary:      Effort: Pulmonary effort is normal. No respiratory distress.   Skin:     Coloration: Skin is not jaundiced or pale.   Neurological:      Mental  Status: She is alert and oriented to person, place, and time.   Psychiatric:         Mood and Affect: Mood normal.         Behavior: Behavior normal.         Thought Content: Thought content normal.         Judgment: Judgment normal.         Recent labs:  Lab Results   Component Value Date/Time    SODIUM 141 11/15/2024 12:34 PM    SODIUM 136 02/20/2024 05:15 PM    K 4.5 11/15/2024 12:34 PM    K 4.0 02/20/2024 05:15 PM    BUN 31 (H) 11/15/2024 12:34 PM    BUN 12 02/20/2024 05:15 PM    CREATININE 1.25 11/15/2024 12:34 PM    CREATININE 0.97 02/20/2024 05:15 PM    GLUC 68 11/15/2024 12:34 PM    GLUC 114 (H) 02/20/2024 05:15 PM    CALCIUM 9.3 11/15/2024 12:34 PM    CALCIUM 9.6 02/20/2024 05:15 PM    AST 28 11/15/2024 12:34 PM    AST 83 (H) 02/20/2024 05:15 PM    ALT 15 11/15/2024 12:34 PM    ALT 50 02/20/2024 05:15 PM    ALB 3.1 (L) 11/15/2024 12:34 PM    ALB 3.5 02/20/2024 05:15 PM    TP 7.5 11/15/2024 12:34 PM    TP 7.6 02/20/2024 05:15 PM    EGFR 44 11/15/2024 12:34 PM    EGFR 64 02/20/2024 05:15 PM     Lab Results   Component Value Date/Time    HGB 8.9 (L) 11/15/2024 12:34 PM    WBC 5.40 11/15/2024 12:34 PM     11/15/2024 12:34 PM    INR 1.09 06/03/2024 08:26 AM    PTT 31 02/21/2024 01:43 PM     Lab Results   Component Value Date/Time    QIC5EJNCAIKK 1.722 07/29/2024 03:03 PM       Recent Imaging:  Procedure: XR chest pa and lateral  Result Date: 10/30/2024  Narrative: XR CHEST PA AND LATERAL INDICATION: infectious work up, r/o aspiration. COMPARISON: PET/CT 8/5/2024, CXR 2/21/2024. FINDINGS: Right port in mid SVC. Clear lungs. No pneumothorax or pleural effusion. Normal cardiomediastinal silhouette. Bones are unremarkable for age. Normal upper abdomen.     Impression: No acute cardiopulmonary disease. Workstation performed: [id number]     Procedure: CT soft tissue neck with contrast  Result Date: 10/29/2024  Narrative: CT NECK WITH CONTRAST INDICATION:   Head and neck cancer, left neck pain. COMPARISON: PET/CT  from August 5, 2024 and CT neck from March 30, 2023 TECHNIQUE:  Axial, sagittal, and coronal 2D reformatted images were created from the axial source data and submitted for interpretation. Radiation dose length product (DLP) for this visit:  508.76 mGy-cm .  This examination, like all CT scans performed in the [facility], was performed utilizing techniques to minimize radiation dose exposure, including the use of iterative  reconstruction and automated exposure control. IV Contrast:  85 mL of iohexol (OMNIPAQUE) IMAGE QUALITY:  Diagnostic. FINDINGS: VISUALIZED BRAIN PARENCHYMA:  No acute intracranial pathology of the visualized brain parenchyma. VISUALIZED ORBITS: Normal visualized orbits. PARANASAL SINUSES: Right sphenoid sinus and bilateral ethmoid sinus mucosal disease. Trace right mastoid fluid without coalescence. NASAL CAVITY AND NASOPHARYNX: No contour deforming lesions identified. Mildly leftward deviated nasal septum. SUPRAHYOID/INFRAHYOID NECK: Post radiation changes with induration in the subcutaneous fat and effacement of the oropharyngeal and supraglottic laryngeal fat planes. No evidence for recurrent or residual right tonsillar mass. No other contour deforming primary aerodigestive tract lesions. THYROID GLAND: Right thyroid nodules, similar to prior CT from 3/30/2023, and better evaluated by ultrasound PAROTID AND SUBMANDIBULAR GLANDS: Mildly atrophic submandibular gland suggesting prior radiation therapy. Normal parotid glands. LYMPH NODES: Ill-defined treated right level 2 claudio metastasis (series 2 image 43) measuring 1.8 x 1.8 cm. On the initial exam from 3/30/2023, this measured 3.7 x 2.2 cm, and is difficult to measure on recent PET/CT. 1.3 x 0.7 cm right level 3 claudio metastasis (series 2 image 49) grossly stable compared to recent PET/CT. No new adenopathy. Scattered subcentimeter shotty left cervical lymph nodes are similar to prior exam. VASCULAR STRUCTURES:  Normal  enhancement of the cervical vasculature. THORACIC INLET: Right chest Port-A-Cath. BONY STRUCTURES: No acute fracture or destructive osseous lesion.     Impression: Posttreatment changes as described without evidence for disease progression compared to PET/CT from 8/5/2024. No acute abnormality. Workstation performed: [id number]     Procedure: IR gastrostomy tube placement  Result Date: 10/2/2024  Narrative: PROCEDURE: Gastrostomy tube placement Procedural Personnel Attending physician(s): Dr. Mosher Pre-procedure diagnosis: Right tonsillar squamous cell carcinoma Post-procedure diagnosis: Same Indication: Patient with right tonsillar squamous cell carcinoma and dysphagia. PROCEDURE SUMMARY: - Gastrostomy tube placement under fluoroscopic guidance PROCEDURE DETAILS: Pre-procedure Consent: Informed consent for the procedure including risks, benefits and alternatives was obtained and time-out was performed prior to the procedure. Preparation: The site was prepared and draped using maximal sterile barrier technique including cutaneous antisepsis. Anesthesia/sedation Level of anesthesia/sedation: Monitored anesthesia care Anesthesia/sedation administered by: Anesthesiology Total intra-service sedation time (minutes): 60 Gastrostomy tube placement The patient was positioned supine. Nasogastric tube was placed by anesthesiology. Following administration of 1 mg glucagon IV, the stomach was inflated with air through the NG tube. 3 T-tacks were placed to secure the body of stomach up against the abdominal wall. 1 cm incision was made at the center of the T-tacks. 19-gauge needle was advanced into the gastric lumen through the incision and a 0.035 inch wire advanced through the needle into the gastric lumen. Tract was serially dilated using telescoping peel-away sheath. 18 Divehi gastrostomy tube was advanced through the peel-away sheath, followed by sheath removal. G-tube balloon was inflated using 8 mL sterile water. Contrast  was injected through the gastrostomy tube to confirm proper positioning. Contrast Contrast agent: Omnipaque Contrast volume (mL): 10 Radiation Dose Fluoroscopy time (minutes): 6.1 Reference air kerma (mGy): 104 Additional Details Estimated blood loss (mL): None Complications: No immediate complications.     Impression: 18 Korean gastrostomy tube placement. Plan: Return in 6 months for routine tube exchange. Workstation performed: [id number]     Procedure: NM PET CT skull base to mid thigh  Result Date: 8/6/2024  Narrative: WHOLE-BODY PET/CT SCAN INDICATION: C18.9: Malignant neoplasm of colon, unspecified C78.7: Secondary malignant neoplasm of liver and intrahepatic bile duct, squamous cell carcinoma the right tonsil, right breast cancer. Restaging. MODIFIER: PS COMPARISON: PET/CT dated April 18, 2024 CELL TYPE:  metastatic adenocarcinoma, s/o colorectal primary (bx 6/22/23 liver +) TECHNIQUE: Following the intravenous administration of 10.4 mCi F-18-FDG, dedicated head and neck images were obtained from the skull vertex to the thoracic inlet with the patient's arms at their side 60 minutes post injection. Subsequently, whole body images were obtained from the thoracic inlet through the proximal femurs with the patients arms above their head.  Multiplanar attenuation corrected and non attenuation corrected PET images are available for interpretation. Contiguous, low dose, axial CT sections were also obtained from the head through the thoracic inlet and from the thoracic inlet through the proximal femurs. Intravenous contrast material was not utilized.  Additional coronal and sagittal images are available as well as fused PET/CT images.  This examination, like all CT scans performed in the [facility], was performed utilizing techniques to minimize radiation dose exposure, including the use of iterative reconstruction and automated exposure control. Fasting serum glucose: 101 mg/dl FINDINGS:  VISUALIZED BRAIN: No acute abnormalities are seen. HEAD/NECK: Increasing focal FDG activity involving the midline posterior prevertebral soft tissues at the level of the nasopharynx with associated increased nodular soft tissue measuring approximately 0.8 x 0.7 cm on image 26 of series 17 with max SUV of 14, previously approximately 0.6 cm with max SUV of 5.3. While findings could reflect inflammatory activity, developing hypermetabolic malignancy in this location is the diagnosis of exclusion. Best seen on PET image 37 of series 26 is new/increased asymmetric nodular activity involving the left tonsillar region with max SUV of 7.9. While findings could reflect inflammatory activity, given the asymmetry and history of previous right tonsillar carcinoma, correlation is recommended to exclude developing left tonsillar carcinoma. Increasing FDG activity associated with bilateral upper cervical lymph node -For example, in the right activity associated with stable enlarged right upper cervical lymph node measuring 1.5 cm in short axis on image 45 of series 17 with max SUV of 5.1, previously similar in size with max SUV of 3.4. -Representative increasing activity in the left associated with stable in size left upper cervical adenopathy T2 series 17, left upper cervical lymph node measures 0.8 cm in short axis with max SUV of, previously similar in size with max SUV of 3.4. CT images: Intracranial atherosclerotic calcification is noted. Partial opacification of the posterior ethmoid air cells. Again noted is multinodular thyroid gland. CHEST: Intense focal FDG activity localizing to the distal tip of right-sided chest port within the distal superior vena cava favored to reflect injected activity within the right-sided chest port with hypermetabolic metastatic disease in this location considered less likely as malignancy within the SVC without adjacent hypermetabolic soft tissue is atypical. Focal nonspecific FDG activity  involving the mid thoracic esophagus at the level of the caity with max SUV of 5.1 (for example PET image 32), possibly inflammatory. CT images: Mild nonspecific asymmetric right breast skin thickening. Right-sided chest port extends towards the caval atrial junction. Atherosclerotic vascular calcifications including those of the coronary arteries are noted. Probable small hiatal hernia. ABDOMEN: As compared to prior PET/CT, there is new focal activity towards the anterior right hepatic dome on PET image 62 series 12 which is difficult to characterize on low-dose unenhanced CT but measures approximately 0.9 cm on PET imaging with max SUV of 11.0 consistent with hypermetabolic hepatic metastatic disease. Best seen on PET image 85 of series 12 is increasing nodular FDG activity about patient's known inferior right hepatic lobe metastatic lesion with new focus lateral to a stable medial focus of FDG activity measuring approximately 3.4 x 1.4 cm in confluence on PET imaging with max SUV of 11.0, previously 2.0 x 1.4 cm with max SUV of 7.2. Persistent mild nonspecific activity about the enterocolonic anastomosis in the right hemicolectomy site, possibly inflammatory in etiology given stability with subtle locally recurrent malignancy in this location not excluded. CT images: Stable left hepatic lobe hypodensity image 74 of series 3 measuring 2.4 cm. Patient's known additional right hepatic lobe lesion seen on prior contrast-enhanced CT are poorly characterized on current low-dose unenhanced CT. Cholelithiasis. Atherosclerotic vascular calcifications are noted. PELVIS: Nonspecific nonuniform, likely bowel activity anterior to the upper uterus on PET image 165 of series 12, possibly physiologic/inflammatory in etiology. CT images: Stable large right adnexal mass, possibly related to underlying myomatous uterus but incompletely characterized on PET/CT. OSSEOUS STRUCTURES: No FDG avid lesions are  seen. CT images:  Degenerative changes are noted involving the spine.     Impression: 1. Findings suspicious for interval progression of hypermetabolic malignancy/metastatic disease involving the neck with increased FDG activity associated with essentially stable in size bilateral upper cervical lymphadenopathy. -In addition, note is made of increasing hypermetabolic soft tissue involving the midline of the prevertebral cervical soft tissues at the level of the nasopharynx; findings could reflect inflammatory activity, correlation with direct visualization and possibly tissue sampling is recommended to exclude developing hypermetabolic malignancy in this location. -Additional note is made of increasing asymmetric nodular activity in the left tonsillar region; while findings could reflect inflammatory activity, given history of right tonsillar carcinoma, correlation is recommended to exclude developing left tonsillar carcinoma. 2. Interval progression of hypermetabolic hepatic metastatic disease, presumably related to history of colon cancer. Findings can be further characterized with contrast-enhanced MRI/CT as clinically indicated. Please see above for details and additional findings. The study was marked in EPIC for significant notification. Workstation performed: [id number]       Administrative Statements   Encounter provider Ariadne Fontana MD    The Patient is located at Home and in the following state in which I hold an active license [place].    The patient was identified by name and date of birth. Maira Moura was informed that this is a telemedicine visit and that the visit is being conducted through the Epic Embedded platform. She agrees to proceed..  My office door was closed. No one else was in the room.  She acknowledged consent and understanding of privacy and security of the video platform. The patient has agreed to participate and understands they can discontinue the visit at any time.    Patient is aware this is a  billable service.    Ariadne Fontana MD  Palliative Medicine & Supportive Care  Internal Medicine  Available via [place] [employer]  Office: [phone]  Fax: [phone]

## 2024-11-19 NOTE — TELEPHONE ENCOUNTER
Attempted to call pt to see if she has video capabilities to switch to virtual as requested by patient.Unable to leave message.  
Nursing

## 2024-11-19 NOTE — ASSESSMENT & PLAN NOTE
S/p IR PEG on 10/1/2024 - meds and most nutrition go to the tube  She said she can swallow Ensure with no issues  She reports completion of concurrent chemoRT  Will discuss next steps in care with onc next week

## 2024-11-19 NOTE — ASSESSMENT & PLAN NOTE
In the neck/throat area, well controlled and tolerated with the ff -   Continue tylenol 160mg/5mL, 20mL (640mg) q6H prn - taking only twice a day. Max of 3000mg in 24H  Continue oxycodone 5mg/5mL, 5mL q6H prn - taking only twice a day  No issues with OIC, she gets more diarrhea with the Jevity TF

## 2024-11-20 ENCOUNTER — PATIENT OUTREACH (OUTPATIENT)
Dept: HEMATOLOGY ONCOLOGY | Facility: CLINIC | Age: 69
End: 2024-11-20

## 2024-11-20 ENCOUNTER — HOSPITAL ENCOUNTER (OUTPATIENT)
Dept: INFUSION CENTER | Facility: CLINIC | Age: 69
Discharge: HOME | End: 2024-11-20
Payer: MEDICARE

## 2024-11-20 VITALS
HEART RATE: 120 BPM | SYSTOLIC BLOOD PRESSURE: 105 MMHG | DIASTOLIC BLOOD PRESSURE: 73 MMHG | TEMPERATURE: 98.1 F | RESPIRATION RATE: 18 BRPM

## 2024-11-20 DIAGNOSIS — C50.411 MALIGNANT NEOPLASM OF UPPER-OUTER QUADRANT OF RIGHT BREAST IN FEMALE, ESTROGEN RECEPTOR POSITIVE (HCC): Primary | ICD-10-CM

## 2024-11-20 DIAGNOSIS — Z17.0 MALIGNANT NEOPLASM OF UPPER-OUTER QUADRANT OF RIGHT BREAST IN FEMALE, ESTROGEN RECEPTOR POSITIVE (HCC): Primary | ICD-10-CM

## 2024-11-20 LAB
ALBUMIN SERPL BCG-MCNC: 3.4 G/DL (ref 3.5–5)
ALP SERPL-CCNC: 112 U/L (ref 34–104)
ALT SERPL W P-5'-P-CCNC: 13 U/L (ref 7–52)
ANION GAP SERPL CALCULATED.3IONS-SCNC: 5 MMOL/L (ref 4–13)
AST SERPL W P-5'-P-CCNC: 25 U/L (ref 13–39)
BASOPHILS # BLD AUTO: 0.01 THOUSANDS/ÂΜL (ref 0–0.1)
BASOPHILS NFR BLD AUTO: 0 % (ref 0–1)
BILIRUB SERPL-MCNC: 0.25 MG/DL (ref 0.2–1)
BUN SERPL-MCNC: 40 MG/DL (ref 5–25)
CALCIUM ALBUM COR SERPL-MCNC: 9.8 MG/DL (ref 8.3–10.1)
CALCIUM SERPL-MCNC: 9.3 MG/DL (ref 8.4–10.2)
CHLORIDE SERPL-SCNC: 105 MMOL/L (ref 96–108)
CO2 SERPL-SCNC: 30 MMOL/L (ref 21–32)
CREAT SERPL-MCNC: 1.27 MG/DL (ref 0.6–1.3)
EOSINOPHIL # BLD AUTO: 0.03 THOUSAND/ÂΜL (ref 0–0.61)
EOSINOPHIL NFR BLD AUTO: 1 % (ref 0–6)
ERYTHROCYTE [DISTWIDTH] IN BLOOD BY AUTOMATED COUNT: 18 % (ref 11.6–15.1)
GFR SERPL CREATININE-BSD FRML MDRD: 43 ML/MIN/1.73SQ M
GLUCOSE SERPL-MCNC: 177 MG/DL (ref 65–140)
HCT VFR BLD AUTO: 27.6 % (ref 34.8–46.1)
HGB BLD-MCNC: 9 G/DL (ref 11.5–15.4)
IMM GRANULOCYTES # BLD AUTO: 0.02 THOUSAND/UL (ref 0–0.2)
IMM GRANULOCYTES NFR BLD AUTO: 0 % (ref 0–2)
LYMPHOCYTES # BLD AUTO: 0.57 THOUSANDS/ÂΜL (ref 0.6–4.47)
LYMPHOCYTES NFR BLD AUTO: 10 % (ref 14–44)
MAGNESIUM SERPL-MCNC: 1.7 MG/DL (ref 1.9–2.7)
MCH RBC QN AUTO: 30.1 PG (ref 26.8–34.3)
MCHC RBC AUTO-ENTMCNC: 32.6 G/DL (ref 31.4–37.4)
MCV RBC AUTO: 92 FL (ref 82–98)
MONOCYTES # BLD AUTO: 0.7 THOUSAND/ÂΜL (ref 0.17–1.22)
MONOCYTES NFR BLD AUTO: 13 % (ref 4–12)
NEUTROPHILS # BLD AUTO: 4.22 THOUSANDS/ÂΜL (ref 1.85–7.62)
NEUTS SEG NFR BLD AUTO: 76 % (ref 43–75)
NRBC BLD AUTO-RTO: 0 /100 WBCS
PLATELET # BLD AUTO: 300 THOUSANDS/UL (ref 149–390)
PMV BLD AUTO: 10.5 FL (ref 8.9–12.7)
POTASSIUM SERPL-SCNC: 4.3 MMOL/L (ref 3.5–5.3)
PROT SERPL-MCNC: 7.6 G/DL (ref 6.4–8.4)
RBC # BLD AUTO: 2.99 MILLION/UL (ref 3.81–5.12)
SODIUM SERPL-SCNC: 140 MMOL/L (ref 135–147)
WBC # BLD AUTO: 5.55 THOUSAND/UL (ref 4.31–10.16)

## 2024-11-20 PROCEDURE — 83735 ASSAY OF MAGNESIUM: CPT | Performed by: INTERNAL MEDICINE

## 2024-11-20 PROCEDURE — 80053 COMPREHEN METABOLIC PANEL: CPT | Performed by: INTERNAL MEDICINE

## 2024-11-20 PROCEDURE — 85025 COMPLETE CBC W/AUTO DIFF WBC: CPT | Performed by: INTERNAL MEDICINE

## 2024-11-20 RX ADMIN — MAGNESIUM SULFATE HEPTAHYDRATE: 500 INJECTION, SOLUTION INTRAMUSCULAR; INTRAVENOUS at 14:10

## 2024-11-20 NOTE — PROGRESS NOTES
Patient presents for hydration and labs. Port accessed, blood return noted, and labs drawn per protocol. Patient tolerated hydration without incident. Declines AVS. Aware of next appointment 11/22 @ 1230.

## 2024-11-20 NOTE — PROGRESS NOTES
Pt called to confirm she was scheduled for transportation today. I confirmed via round trip she is scheduled for a 1:15 . She was thankful for the assistance

## 2024-11-21 ENCOUNTER — TELEPHONE (OUTPATIENT)
Dept: NUTRITION | Facility: CLINIC | Age: 69
End: 2024-11-21

## 2024-11-21 NOTE — TELEPHONE ENCOUNTER
Maira was scheduled for an RD phone follow up appointment today (11/21/2024) and was unable to attend her appointment (no answer).  A call was placed and a voice message was left encouraging her to call back and reschedule her appointment at a more convenient time for her. RD contact information was provided.

## 2024-11-22 ENCOUNTER — NUTRITION (OUTPATIENT)
Dept: NUTRITION | Facility: CLINIC | Age: 69
End: 2024-11-22

## 2024-11-22 ENCOUNTER — HOSPITAL ENCOUNTER (OUTPATIENT)
Dept: INFUSION CENTER | Facility: CLINIC | Age: 69
End: 2024-11-22
Payer: MEDICARE

## 2024-11-22 DIAGNOSIS — C50.411 MALIGNANT NEOPLASM OF UPPER-OUTER QUADRANT OF RIGHT BREAST IN FEMALE, ESTROGEN RECEPTOR POSITIVE (HCC): Primary | ICD-10-CM

## 2024-11-22 DIAGNOSIS — Z17.0 MALIGNANT NEOPLASM OF UPPER-OUTER QUADRANT OF RIGHT BREAST IN FEMALE, ESTROGEN RECEPTOR POSITIVE (HCC): Primary | ICD-10-CM

## 2024-11-22 LAB
ALBUMIN SERPL BCG-MCNC: 3.3 G/DL (ref 3.5–5)
ALP SERPL-CCNC: 120 U/L (ref 34–104)
ALT SERPL W P-5'-P-CCNC: 15 U/L (ref 7–52)
ANION GAP SERPL CALCULATED.3IONS-SCNC: 6 MMOL/L (ref 4–13)
AST SERPL W P-5'-P-CCNC: 32 U/L (ref 13–39)
BASOPHILS # BLD AUTO: 0.01 THOUSANDS/ÂΜL (ref 0–0.1)
BASOPHILS NFR BLD AUTO: 0 % (ref 0–1)
BILIRUB SERPL-MCNC: 0.31 MG/DL (ref 0.2–1)
BUN SERPL-MCNC: 34 MG/DL (ref 5–25)
CALCIUM ALBUM COR SERPL-MCNC: 9.9 MG/DL (ref 8.3–10.1)
CALCIUM SERPL-MCNC: 9.3 MG/DL (ref 8.4–10.2)
CHLORIDE SERPL-SCNC: 104 MMOL/L (ref 96–108)
CO2 SERPL-SCNC: 30 MMOL/L (ref 21–32)
CREAT SERPL-MCNC: 1.36 MG/DL (ref 0.6–1.3)
EOSINOPHIL # BLD AUTO: 0.03 THOUSAND/ÂΜL (ref 0–0.61)
EOSINOPHIL NFR BLD AUTO: 1 % (ref 0–6)
ERYTHROCYTE [DISTWIDTH] IN BLOOD BY AUTOMATED COUNT: 17.7 % (ref 11.6–15.1)
GFR SERPL CREATININE-BSD FRML MDRD: 40 ML/MIN/1.73SQ M
GLUCOSE SERPL-MCNC: 222 MG/DL (ref 65–140)
HCT VFR BLD AUTO: 28 % (ref 34.8–46.1)
HGB BLD-MCNC: 9 G/DL (ref 11.5–15.4)
IMM GRANULOCYTES # BLD AUTO: 0.03 THOUSAND/UL (ref 0–0.2)
IMM GRANULOCYTES NFR BLD AUTO: 1 % (ref 0–2)
LYMPHOCYTES # BLD AUTO: 0.49 THOUSANDS/ÂΜL (ref 0.6–4.47)
LYMPHOCYTES NFR BLD AUTO: 8 % (ref 14–44)
MAGNESIUM SERPL-MCNC: 1.8 MG/DL (ref 1.9–2.7)
MCH RBC QN AUTO: 29.2 PG (ref 26.8–34.3)
MCHC RBC AUTO-ENTMCNC: 32.1 G/DL (ref 31.4–37.4)
MCV RBC AUTO: 91 FL (ref 82–98)
MONOCYTES # BLD AUTO: 0.69 THOUSAND/ÂΜL (ref 0.17–1.22)
MONOCYTES NFR BLD AUTO: 11 % (ref 4–12)
NEUTROPHILS # BLD AUTO: 5.09 THOUSANDS/ÂΜL (ref 1.85–7.62)
NEUTS SEG NFR BLD AUTO: 79 % (ref 43–75)
NRBC BLD AUTO-RTO: 0 /100 WBCS
PLATELET # BLD AUTO: 301 THOUSANDS/UL (ref 149–390)
PMV BLD AUTO: 10.6 FL (ref 8.9–12.7)
POTASSIUM SERPL-SCNC: 4.5 MMOL/L (ref 3.5–5.3)
PROT SERPL-MCNC: 7.5 G/DL (ref 6.4–8.4)
RBC # BLD AUTO: 3.08 MILLION/UL (ref 3.81–5.12)
SODIUM SERPL-SCNC: 140 MMOL/L (ref 135–147)
WBC # BLD AUTO: 6.34 THOUSAND/UL (ref 4.31–10.16)

## 2024-11-22 PROCEDURE — 96365 THER/PROPH/DIAG IV INF INIT: CPT

## 2024-11-22 PROCEDURE — 96366 THER/PROPH/DIAG IV INF ADDON: CPT

## 2024-11-22 PROCEDURE — 80053 COMPREHEN METABOLIC PANEL: CPT | Performed by: INTERNAL MEDICINE

## 2024-11-22 PROCEDURE — 83735 ASSAY OF MAGNESIUM: CPT | Performed by: INTERNAL MEDICINE

## 2024-11-22 PROCEDURE — 85025 COMPLETE CBC W/AUTO DIFF WBC: CPT | Performed by: INTERNAL MEDICINE

## 2024-11-22 RX ADMIN — MAGNESIUM SULFATE HEPTAHYDRATE: 500 INJECTION, SOLUTION INTRAMUSCULAR; INTRAVENOUS at 13:23

## 2024-11-22 NOTE — TELEPHONE ENCOUNTER
Called Maira again today to touch base. Spoke w/ Maira and she stated she was sleeping yesterday which is why she missed our appt. She was agreeable to reschedule. RD follow up appt rescheduled for today at 2 pm via phone.

## 2024-11-22 NOTE — PROGRESS NOTES
Outpatient Oncology Nutrition Consultation   Type of Consult: Follow Up   Care Location: Telephone Call    Reason for referral: Received notification by [facility]APATA on 8/21/24 that pt has triggered for oncology nutrition care (reason for referral: HNC dx and Malnutrition Screening Tool (MST) Triggers: scored a 0 indicating No recent wt loss and is not eating poorly due to a decreased appetite. 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  - grade 2  - %  - MS 95%  - HER2 negative     12/20/2018 Surgery    Right partial mastectomy and SLN biopsy:  Infiltrating ductal carcinoma, Grade 2/3, felt to be between 2.0 cm and 2.5 cm in greatest dimension  - No invasive tumor is seen at inked margins, but there is a focus of DCIS seen within approximately 1mm of the inked medial margin  - no LVI  - no LCIS  - 0/2 LN's  at least Stage IIA - pT2, pN0, cM0, G2.    - Dr Coronado     12/20/2018 -  Cancer Staged    Stage IIA - pT2, pN0, cM0, G2.       1/4/2019 Genomic Testing    Oncotype DX score: 13     2/1/2019 -  Hormone Therapy    anastrozole 1 mg daily as adjuvant endocrine therapy    - Dr Daley      "  2/14/2019 - 4/3/2019 Radiation    Course: C1    Plan ID Energy Fractions Dose per Fraction (cGy) Dose Correction (cGy) Total Dose Delivered (cGy) Elapsed Days   R Breast 10X/6X 25 / 25 200 0 5,000 40   R BRST BOOST 16E 5 / 5 200 0 1,000 7      Treatment dates:  C1: 2/14/2019 - 4/3/2019       Metastatic colon cancer to liver (HCC)   7/6/2023 Genetic Testing    CARIS Results:   KRAS Pathogenic Variant Exon 2  p.G12D : lack of benefit of cetuximab, panitumumab Level 2   No other actionable mutation; MSI stable; TMB low   MiFOLOXAi Results: \"Decreased Benefit of FOLFOX + Bevacizumab in first line metastatic CRC.  This patient may achieve improved results by receiving an alternative to FOLFOX, such as FOLFIRI, as their initial regimen. As an adjustment to frontline FOLFOXIRI following toxicity: This patient may achieve improved results by removing the oxaliplatin portion of their regimen\".         7/11/2023 Initial Diagnosis    Metastatic colon cancer to liver (HCC)     7/13/2023 -  Cancer Staged    Staging form: Colon and Rectum, AJCC 8th Edition  - Clinical stage from 7/13/2023: cT3, cN0, pM1 - Signed by Harika Medina DO on 9/28/2023  Total positive nodes: 0       7/31/2023 - 8/1/2024 Chemotherapy    cyanocobalamin, 1,000 mcg, Intramuscular, Every 30 days, 7 of 9 cycles  Administration: 1,000 mcg (5/9/2024), 1,000 mcg (5/24/2024), 1,000 mcg (6/20/2024), 1,000 mcg (7/3/2024), 1,000 mcg (7/18/2024), 1,000 mcg (8/1/2024)  potassium chloride, 20 mEq, Intravenous, Once, 2 of 2 cycles  Administration: 20 mEq (11/6/2023), 20 mEq (11/20/2023)  alteplase (CATHFLO), 2 mg, Intracatheter, Every 1 Minute as needed, 21 of 23 cycles  Administration: 2 mg (1/3/2024)  pegfilgrastim (NEULASTA), 6 mg, Subcutaneous, Once, 12 of 12 cycles  Administration: 6 mg (10/25/2023), 6 mg (11/8/2023), 6 mg (11/22/2023), 6 mg (12/6/2023), 6 mg (12/20/2023), 6 mg (1/12/2024), 6 mg (1/25/2024), 6 mg (4/25/2024), 6 mg (5/9/2024), 6 mg (5/24/2024), " 6 mg (6/20/2024)  fluorouracil (ADRUCIL), 895 mg, Intravenous, Once, 21 of 23 cycles  Dose modification: 320 mg/m2 (original dose 400 mg/m2, Cycle 11)  Administration: 900 mg (7/31/2023), 900 mg (8/14/2023), 900 mg (8/28/2023), 900 mg (9/11/2023), 900 mg (9/25/2023), 900 mg (10/9/2023), 900 mg (10/23/2023), 895 mg (11/6/2023), 895 mg (11/20/2023), 895 mg (12/4/2023), 700 mg (12/18/2023), 680 mg (1/10/2024), 680 mg (1/23/2024), 625 mg (4/23/2024), 625 mg (5/7/2024), 625 mg (5/22/2024), 625 mg (6/4/2024), 625 mg (6/18/2024), 625 mg (7/1/2024), 625 mg (7/16/2024), 625 mg (7/30/2024)  nivolumab (OPDIVO) IVPB, 240 mg (100 % of original dose 240 mg), Intravenous, Once, 13 of 15 cycles  Dose modification: 240 mg (original dose 240 mg, Cycle 9)  Administration: 240 mg (11/20/2023), 240 mg (12/4/2023), 240 mg (12/18/2023), 240 mg (1/10/2024), 240 mg (1/23/2024), 240 mg (4/23/2024), 240 mg (5/7/2024), 240 mg (5/22/2024), 240 mg (6/4/2024), 240 mg (6/18/2024), 240 mg (7/1/2024), 240 mg (7/16/2024), 240 mg (7/30/2024)  leucovorin calcium IVPB, 896 mg, Intravenous, Once, 21 of 23 cycles  Administration: 900 mg (7/31/2023), 900 mg (8/14/2023), 900 mg (8/28/2023), 900 mg (9/11/2023), 900 mg (9/25/2023), 900 mg (10/9/2023), 900 mg (10/23/2023), 900 mg (11/6/2023), 900 mg (11/20/2023), 900 mg (12/4/2023), 900 mg (12/18/2023), 850 mg (1/10/2024), 850 mg (1/23/2024), 800 mg (4/23/2024), 800 mg (5/7/2024), 800 mg (5/22/2024), 800 mg (6/4/2024), 800 mg (6/18/2024), 800 mg (7/1/2024), 800 mg (7/16/2024), 800 mg (7/30/2024)  oxaliplatin (ELOXATIN) chemo infusion, 85 mg/m2 = 190.4 mg, Intravenous, Once, 12 of 12 cycles  Dose modification: 68 mg/m2 (original dose 85 mg/m2, Cycle 11, Reason: Other (Must fill in a comment), Comment: increased fatigue)  Administration: 190.4 mg (7/31/2023), 190.4 mg (8/14/2023), 200 mg (8/28/2023), 200 mg (9/11/2023), 200 mg (9/25/2023), 200 mg (10/9/2023), 200 mg (10/23/2023), 200 mg (11/6/2023), 200 mg  (11/20/2023), 190.4 mg (12/4/2023), 150 mg (12/18/2023), 150 mg (1/10/2024)  fluorouracil (ADRUCIL) ambulatory infusion Soln, 1,200 mg/m2/day = 5,375 mg, Intravenous, Over 46 hours, 21 of 23 cycles     9/26/2023 -  Cancer Staged    Staging form: Colon and Rectum, AJCC 8th Edition  - Pathologic: pT3, pN0, cM1 - Signed by Vickie Israel MD on 4/3/2024  Total positive nodes: 0       3/12/2024 Surgery    A. Terminal ileum, appendix, right colon (right hemicolectomy):  - Adenocarcinoma (5.2cm)  - Forty-nine (49) lymph nodes negative for carcinoma (0/49)    - Acellular mucin present in one (1) lymph node   - See staging synoptic (ypT3N0)     Right tonsillar squamous cell carcinoma (HCC)   7/27/2023 Initial Diagnosis    Right tonsillar squamous cell carcinoma (HCC)     7/27/2023 -  Cancer Staged    Staging form: Pharynx - Oropharynx, AJCC 8th Edition  - Clinical stage from 7/27/2023: Stage III (cT1, cN3, cM0, p16+) - Signed by Evan Plummer MD on 7/27/2023  Stage prefix: Initial diagnosis       9/17/2024 - 10/21/2024 Chemotherapy    alteplase (CATHFLO), 2 mg, Intracatheter, Every 1 Minute as needed, 6 of 6 cycles  Administration: 2 mg (10/21/2024)  CISplatin (PLATINOL) infusion, 70 mg (original dose 40 mg/m2), Intravenous, Once, 6 of 6 cycles  Dose modification: 70 mg (original dose 40 mg/m2, Cycle 1, Reason: Anticipated Tolerance), 30 mg/m2 (original dose 40 mg/m2, Cycle 4, Reason: Neuropathy, Comment: dose reduction to 30 mg/m2 due to neuropathy)  Administration: 70 mg (9/17/2024), 70 mg (9/23/2024), 70 mg (9/30/2024), 59.1 mg (10/7/2024), 59.1 mg (10/14/2024), 59.1 mg (10/21/2024)  sodium chloride, 500 mL, Intravenous, Once, 6 of 6 cycles  Administration: 500 mL (9/17/2024), 500 mL (9/17/2024), 500 mL (9/23/2024), 500 mL (9/23/2024), 500 mL (9/30/2024), 500 mL (9/30/2024), 500 mL (10/7/2024), 500 mL (10/7/2024), 500 mL (10/14/2024), 500 mL (10/14/2024), 500 mL (10/21/2024)  fosaprepitant (EMEND) IVPB, 150  mg, Intravenous, Once, 6 of 6 cycles  Administration: 150 mg (9/17/2024), 150 mg (9/23/2024), 150 mg (9/30/2024), 150 mg (10/7/2024), 150 mg (10/14/2024), 150 mg (10/21/2024)  IVPB builder, , Intravenous, Once, 1 of 1 cycle  Administration: Unknown dose (10/21/2024)       Past Medical & Surgical Hx:   Patient Active Problem List   Diagnosis    Primary hypertension    Mixed hyperlipidemia    Malignant neoplasm of right female breast (HCC)    Vitamin D deficiency    Prediabetes    Right thyroid nodule    GERD (gastroesophageal reflux disease)    Obesity    Metastatic colon cancer to liver (HCC)    Right tonsillar squamous cell carcinoma (HCC)    Poor appetite    Nutritional anemia    Dehydration    Drug-induced constipation    Chemotherapy induced neutropenia (HCC)    Stage 3a chronic kidney disease (HCC)    Thrombocytopenia (HCC)    Neuropathy    Diastolic dysfunction    Hypokalemia    Leukemoid reaction    GOGO (acute kidney injury) (HCC)    Acute post-operative pain    At risk for constipation    At risk for delirium    Palliative care encounter    Physical deconditioning    History of CVA (cerebrovascular accident)    Chronic nasal congestion    Elevated LFTs    Vitamin B12 deficiency    Neoplastic malignant related fatigue    Sensation, choking    S/P percutaneous endoscopic gastrostomy (PEG) tube placement (HCC)    Pancytopenia due to chemotherapy (HCC)    Cancer related pain     Past Medical History:   Diagnosis Date    Anemia     Arthritis     Body mass index (BMI) 40.0-44.9, adult (HCC) 9/22/2023    Breast cancer (HCC) 12/17/2018    Cancer (HCC)     right breast, colon, liver, right tonsil    Cervical lymphadenopathy 3/21/2023    CT neck on 3/30/2023- Large right level 2A lymphadenopathy as described above and suspicious for neoplasm.  Correlation with histopathology is recommended.  Mild asymmetric prominence of the right palatine tonsil with otherwise no definitive nodular enhancing lesions identified along  the course of the aerodigestive tract.     23- FNA of this node was nonrevealing for tissue etiology, but it d    Encounter for screening for HIV 2022    Follow-up examination 2023    GERD (gastroesophageal reflux disease)     Hyperlipidemia     Hypertension     Obesity     Stroke (HCC)     TIA     Stroke (HCC)     TIA     Transaminitis 2021    Vasomotor rhinitis 2018    Refilled flonase today. Stopped taking since 2022     Past Surgical History:   Procedure Laterality Date    BREAST BIOPSY Right 2018    us guided bx cancer    BREAST SURGERY      COLONOSCOPY      ESOPHAGOSCOPY N/A 2023    Procedure: ESOPHAGOSCOPY;  Surgeon: David Chapa MD;  [facility];  Service: ENT    HEMICOLOECTOMY W/ ANASTOMOSIS Right 3/12/2024    Procedure: RIGHT COLECTOMY WITH ROBOTICS;  Surgeon: Vickie Israel MD;  [facility];  Service: Surgical Oncology    IR BIOPSY LIVER MASS  2023    IR CRYOABLATION  6/3/2024    IR GASTROSTOMY TUBE PLACEMENT  10/2/2024    IR PORT CHECK  2024    IR PORT PLACEMENT  2023    IR PORT STRIPPING  2024    LAPAROTOMY N/A 3/12/2024    Procedure: LAPAROTOMY EXPLORATORY W/ ROBOTICS;  Surgeon: Vickie Israel MD;  [facility]: [facility];  Service: Surgical Oncology    AK BIOPSY NASOPHARYNX VISIBLE LESION SIMPLE N/A 10/9/2024    Procedure: NASOPHARYNGOSCOPY with biospy;  Surgeon: Juanito Matthew MD;  [facility];  Service: ENT    AK [facility] INCL FLUOR GDNCE DX W/CELL WASHG SPX N/A 2023    Procedure: BRONCHOSCOPY;  Surgeon: David Chapa MD;  [facility];  Service: ENT    AK LARYNGOSCOPY W/BIOPSY MICROSCOPE/TELESCOPE N/A 2023    Procedure: MICRODIRECT LARYNGOSCOPY WITH BIOPSY;  Surgeon: David Chapa MD;  [facility];  Service: ENT    AK LARYNGOSCOPY W/WO TRACHEOSCOPY DX EXCEPT  N/A 10/9/2024    Procedure: DIRECT LARYNGOSCOPY;  Surgeon: Juanito Matthew MD;  [facility]  [facility];  Service: ENT    MD MASTECTOMY PARTIAL W/AXILLARY LYMPHADENECTOMY Right 12/20/2018    Procedure: ULTRASOUND LOCALIZED PARTIAL MASTECTOMY W/SENTINEL NODE BIOPSY POSS AXILLARY DISSECTION;  Surgeon: Zahida Coronado MD;  Location: [facility];  Service: General    MD TONSILLECTOMY PRIMARY/SECONDARY AGE 13/> N/A 10/9/2024    Procedure: TONSILLECTOMY;  Surgeon: Juanito Matthew MD;  [facility];  Service: ENT    TUBAL LIGATION      US GUIDED BREAST BIOPSY RIGHT COMPLETE Right 11/23/2018    US GUIDED INJECTION FOR RESEARCH STUDY  12/20/2018    US GUIDED INJECTION FOR RESEARCH STUDY  12/07/2018    US GUIDED INJECTION FOR RESEARCH STUDY  05/03/2019    US GUIDED LYMPH NODE BIOPSY RIGHT  05/26/2023       Review of Medications:   Vitamins, Supplements and Herbals: No, pt denies taking supplements    Current Outpatient Medications:     acetaminophen (TYLENOL) 160 mg/5 mL liquid, Take 20 mL (640 mg total) by mouth every 6 (six) hours as needed for mild pain for up to 10 days, Disp: 473 mL, Rfl: 0    amLODIPine (NORVASC) 5 mg tablet, 1 tablet (5 mg total) by Per G Tube route daily, Disp: 30 tablet, Rfl: 0    anastrozole (ARIMIDEX) 1 mg tablet, Take 1 tablet (1 mg total) by mouth daily, Disp: 90 tablet, Rfl: 3    atorvastatin (LIPITOR) 40 mg tablet, Take 1 tablet (40 mg total) by mouth daily at bedtime, Disp: 30 tablet, Rfl: 2    diphenhydramine, lidocaine, Al/Mg hydroxide, simethicone (Magic Mouthwash) SUSP, Swish and swallow 10 mL every 4 (four) hours as needed for mouth pain or discomfort, Disp: 480 mL, Rfl: 1    docusate sodium (COLACE) 50 mg capsule, Take 1 capsule (50 mg total) by mouth 2 (two) times a day, Disp: 90 capsule, Rfl: 1    ergocalciferol (VITAMIN D2) 50,000 units, Take 1 capsule (50,000 Units total) by mouth once a week, Disp: 90 capsule, Rfl: 3    folic acid (FOLVITE) 1 mg tablet, Take 1 tablet (1 mg total) by mouth in the morning, Disp: 90 tablet, Rfl: 3    gabapentin (NEURONTIN) 300  mg capsule, Take 1 pills in the morning, 1 pill at lunch and 2 pills before bed, Disp: 120 capsule, Rfl: 3    hydrocortisone 1 % ointment, , Disp: , Rfl:     ibuprofen (MOTRIN) 100 mg/5 mL suspension, Take 20 mL (400 mg total) by mouth every 6 (six) hours as needed for moderate pain, Disp: 473 mL, Rfl: 0    lidocaine-prilocaine (EMLA) cream, Apply topically as needed for mild pain, Disp: 30 g, Rfl: 3    losartan (COZAAR) 50 mg tablet, Take 1 tablet (50 mg total) by mouth daily, Disp: 90 tablet, Rfl: 5    magnesium Oxide (MAG-OX) 400 mg TABS, 1 tablet (400 mg total) by Per G Tube route 2 (two) times a day, Disp: 60 tablet, Rfl: 0    mirtazapine (REMERON) 15 mg tablet, Take 1 tablet (15 mg total) by mouth daily at bedtime Patient is also on a 30 mg tablet, for a total dose of 45 mg, Disp: 30 tablet, Rfl: 3    mirtazapine (REMERON) 30 mg tablet, Take 1 tablet (30 mg total) by mouth daily at bedtime, Disp: 30 tablet, Rfl: 3    omeprazole (PriLOSEC) 20 mg delayed release capsule, Take 1 capsule (20 mg total) by mouth daily, Disp: 30 capsule, Rfl: 5    ondansetron (ZOFRAN) 8 mg tablet, Take 1 tablet (8 mg total) by mouth every 8 (eight) hours as needed for nausea or vomiting, Disp: 90 tablet, Rfl: 2    oxyCODONE (ROXICODONE) 5 mg/5 mL solution, Take 5 mL (5 mg total) by mouth every 6 (six) hours as needed for severe pain ((breakthrough pain)) Max Daily Amount: 20 mg, Disp: 473 mL, Rfl: 0    potassium chloride (Klor-Con M20) 20 mEq tablet, Take 1 tablet (20 mEq total) by mouth 2 (two) times a day, Disp: 90 tablet, Rfl: 1    potassium chloride 10% oral solution, 15 mL (20 mEq total) by Per G Tube route 2 (two) times a day, Disp: 473 mL, Rfl: 0    prochlorperazine (COMPAZINE) 10 mg tablet, Take 1 tablet (10 mg total) by mouth every 6 (six) hours as needed for nausea or vomiting, Disp: 90 tablet, Rfl: 2    pyridoxine (VITAMIN B6) 50 mg tablet, Take 1 tablet (50 mg total) by mouth daily, Disp: 90 tablet, Rfl: 3    vitamin B-12  "(VITAMIN B-12) 1,000 mcg tablet, , Disp: , Rfl:   No current facility-administered medications for this visit.    Facility-Administered Medications Ordered in Other Visits:     alteplase (CATHFLO) injection 2 mg, 2 mg, Intracatheter, Q1MIN PRN, Harika Agostino, DO    alteplase (CATHFLO) injection 2 mg, 2 mg, Intracatheter, Q1MIN PRN, Harika Agostino, DO    magnesium sulfate 2 g in sodium chloride 0.9 % 1,000 mL IV, , Intravenous, Once, Harika Agostino, DO, Last Rate: 500 mL/hr at 11/22/24 1323, New Bag at 11/22/24 1323    Most Recent Lab Results:   Lab Results   Component Value Date    WBC 5.55 11/20/2024    NEUTROABS 4.22 11/20/2024    IRON 60 11/01/2024    TIBC 177 (L) 11/01/2024    FERRITIN 533 (H) 11/01/2024    CHOLESTEROL 167 04/24/2024    TRIG 137 04/24/2024    HDL 43 (L) 04/24/2024    LDLCALC 97 04/24/2024    ALT 13 11/20/2024    AST 25 11/20/2024    ALB 3.4 (L) 11/20/2024    SODIUM 140 11/20/2024    SODIUM 141 11/15/2024    K 4.3 11/20/2024    K 4.5 11/15/2024     11/20/2024    BUN 40 (H) 11/20/2024    BUN 31 (H) 11/15/2024    CREATININE 1.27 11/20/2024    CREATININE 1.25 11/15/2024    EGFR 43 11/20/2024    PHOS 2.8 11/01/2024    PHOS 3.3 10/30/2024    TSH 1.58 01/03/2022    GLUCOSE 209 (H) 03/12/2024    POCGLU 101 08/05/2024    GLUF 102 (H) 10/30/2024    GLUF 95 09/13/2024    GLUC 177 (H) 11/20/2024    HGBA1C 5.5 02/21/2024    HGBA1C 5.9 (H) 06/13/2023    HGBA1C 6.1 (H) 07/09/2022    CALCIUM 9.3 11/20/2024    FOLATE >20.0 (H) 05/23/2019    MG 1.7 (L) 11/20/2024       Anthropometric Measurements:   Height: 66\"  Ht Readings from Last 1 Encounters:   10/29/24 5' 5.98\" (1.676 m)     Wt Readings from Last 20 Encounters:   10/29/24 86.2 kg (190 lb)   10/24/24 86.6 kg (190 lb 14.7 oz)   10/21/24 86.6 kg (190 lb 14.7 oz)   10/15/24 88.5 kg (195 lb)   10/14/24 87.1 kg (192 lb)   10/10/24 89.3 kg (196 lb 13.9 oz)   10/09/24 89.4 kg (197 lb 1.5 oz)   10/07/24 87 kg (191 lb 12.8 oz)   10/02/24 88.9 kg (195 lb " 15.8 oz)   10/01/24 89.4 kg (197 lb)   09/30/24 88 kg (194 lb 0.1 oz)   09/23/24 88.2 kg (194 lb 7.1 oz)   09/17/24 90 kg (198 lb 6.6 oz)   09/13/24 88.5 kg (195 lb)   09/10/24 89.8 kg (198 lb)   09/03/24 89.8 kg (198 lb)   08/20/24 92.2 kg (203 lb 3.2 oz)   08/16/24 90.3 kg (199 lb)   08/06/24 88.9 kg (196 lb)   07/30/24 87.8 kg (193 lb 9 oz)       Weight History:   Usual Weight: 275#  Varian: (2/22/19) 264.6#, (4/2/19) 263.4, (9/16/24) 197.4#, (9/23/24) 194#, (9/30/24) 192.8#, (10/7/24) 190.2#, (10/11/24) 194.6#, (10/14/24) 191#, (10/17/24) 192.8#, (10/21/24) 190#, (10/24/24) 190#, (10/28/24) 189#, (11/11/24) 185#  Home Scale: n/a    Oncology Nutrition-Anthropometrics      Flowsheet Row Nutrition from 11/22/2024 in [facility] Nutrition from 11/13/2024 in [facility]   Patient [age] (years): 69 years 69 years   Patient (female) height (in): 66 in 66 in   Current Weight to be used for anthropometric calculations (lbs) --  [no updated weight in EPIC] 185 lbs   Current Weight to be used for anthropometric calculations (kg) -- 84.1 kg   BMI: -- 29.9   IBW female: 130 lbs 130 lbs   IBW (kg) female: 59.1 kg 59.1 kg   IBW % (female) -- 142.3 %   Adjusted BW (female): -- 143.8 lbs   Adjusted BW kg (female): -- 65.4 kg   % weight change after 1 month: -- -4.9 %   Weight change after 1 month (lbs) -- -9.6 lbs   % weight change after 3 months: -- -7 %   Weight change after 3 months (lbs) -- -14 lbs            Nutrition-Focused Physical Findings: n/a due to telephone call    Food/Nutrition-Related History & Client/Social History:    Current Nutrition Impact Symptoms:  [] Nausea  [x] Reduced Appetite  [] Acid Reflux    [] Vomiting  [x] Unintended Wt Loss [] Malabsorption    [x] Diarrhea - more recently. Started imodium which is helping [] Unintended Wt Gain  [] Dumping Syndrome    [] Constipation  [] Thick Mucous/Secretions  [] Abdominal Pain    []  Dysgeusia (Altered Taste)  [x] Xerostomia (Dry Mouth)  [] Gas    [] Dysosmia (Altered Smell)  [] Thrush  [] Difficulty Chewing    [] Oral Mucositis (Sore Mouth)  [x] Fatigue  [] Hyperglycemia   Lab Results   Component Value Date    HGBA1C 5.5 2024      [x] Odynophagia  [] Esophagitis  [] Other:    [x] Dysphagia -enteral nutrition as 1' source of nutrition  Was upgraded to pureed foods per SLP while inpatient [] Early Satiety  [] No Problems Eating      Food Allergies & Intolerances: no    Current Diet: Tube Feeding, pureed, some soft foods  Current Nutrition Intake: Unchanged from last visit  Appetite: Fair   Nutrition Route: PEG, some PO  Oral Care: brushes daily  Activity level: ADLs.     24 Hr Diet Recall:   Mashed potatoes yesterday    Beverages: water (sips throughout the day)  Supplements:   Ensure Complete (10 oz, 350 kcal, 30 g pro) occasionally    EN Recall:  DME: [employer]  Access Type: PEG  Formula: Jevity 1.5  Method: Bolus  Regimen: 16oz (474 mL) 3 times per day of Jevity 1.5 via PEG (gravity syringe method to administer boluses; 1 container infusing over ~15-20 minutes).  Flush: 60 mL free water flush  pre/post each bolus (120 mL x 3 boluses = 360 mL), + additional 600 mL throughout the day  EN providin mL volume (6 cartons/day), 2133 kcal (100% est needs), 91g protein (100% est needs), 1081 mL free water, 1441 mL total water (72% est needs).      Oncology Nutrition-Estimated Needs      Flowsheet Row Nutrition from 2024 in [facility] Nutrition from 2024 in [facility]   Weight type used Adjusted weight Actual weight   Weight in kilograms (kg) used for estimated needs 66.4 kg 90 kg   Energy needs formula:  30-35 kcal/kg 30-35 kcal/kg   Energy needs based on 30 kcal/k kcal 2700 kcal   Energy needs based on 35 kcal/k kcal 3150 kcal   Protein needs formula: 1.2-1.5 g/kg 1.2-1.5 g/kg    Protein needs based on 1.2 g/k g 108 g   Protein needs based on 1.5 g/kg 100 g 135 g   Fluid needs formula: 30-35 mL/kg 30-35 mL/kg   Fluid needs based on 30 mL/kg 1992 mL 2700 mL   Fluid needs in ounces 67 oz 91 oz   Fluid needs based on 35 mL/kg 2324 mL 3150 mL   Fluid needs in ounces 79 oz 106 oz             Discussion & Intervention:   Maira was evaluated today for an RD follow up regarding HNC.  Maira has completed tx for HNC .  Spoke with Maira today via phone. She continues to tolerate her TF regimen at goal. She tries to eat some soft foods but still finds it difficult to get much PO intake in. Yesterday she had a small amount of mashed potatoes. She sips water throughout the day. She is still getting IV hydration. She occasionally will drink Ensure which she feels goes down better. Discussed continuing  6 cartons of TF and continuing to trial PO intake as able. There is no updated weight in EPIC since our last visit- I encouraged her to ask to be weighed at the infusion center today as she is there for hydration.    Reviewed 24 hour recall, which revealed an adequate enteral intake, and discussed ways to optimize nutrient intake.  Also reviewed the importance of wt management throughout the tx process and the role of enteral nutrition in managing wt and overall health.  Based on today's assessment, discussion included: MNT for:  xerostomia, fluid, pureed diet, soft/moist foods, enteral nutrition, practicing proper oral care, adequate hydration & fluid choices, practicing proper feeding tube use & care, adherence to and compliance with enteral nutrition plan of care, and individualized enteral nutrition recommendations & plan:   .   Moving forward, Maira was encouraged to continue current enteral nutrition plan of care   Materials Provided: not applicable  All questions and concerns addressed during today’s visit.  Maira has RD contact information.    Nutrition Diagnosis:   Increased  Nutrient Needs (kcal & pro) related to increased demand for nutrients and disease state as evidenced by recovering from cancer tx.  Swallowing Difficulty related to diagnosis/treatment related causes as evidenced by RT tx, need for feeding tube.  Monitoring & Evaluation:   Goals:  weight maintenance/stabilization  adequate nutrition impact symptom management  pt to meet >/=75% estimated nutrition needs daily  achieve tube feeding goal rate    Progress Towards Goals: Progressing and Partially Met    Nutrition Rx & Recommendations:  Diet: enteral nutrition as 1' source of nutrition  Stay hydrated by sipping fluids of choice/tolerance throughout the day.    Follow proper oral care; Try baking soda/salt water rinse recipe (mix 3/4 tsp salt + 1 tsp baking soda + 1 qt water; rinse with plain water after using) in Eating Hints book (pg 18).  Brush your teeth before/after meals & before bed.  Weigh yourself regularly. If you notice weight loss, make an effort to increase your daily food/calorie intake. If you continue to notice loss after these efforts, reach out to your dietitian to establish a plan to stabilize weight.   Pureed foods as able  Pureed Foods:  To puree foods: placed chopped food into  or . Add enough liquid to cover the blade. Blend until food is smooth and free of chunks.  You may add non-fat milk, clear broths or non-fat gravies when blending foods.  You can use baby food as long as you have a pureed protein source as the main part of your meal.  Avoid items with added sugars (read food labels).  Ideas for pureed foods: yogurt, cottage cheese, jello, pureed soups, applesauce, pureed fruit, baby food vegetables/pureed vegetables, mashed potatoes, oatmeal or cream of wheat (make with milk for more calories/protein)  If able to consume soft/moist foods, recommend : Soft/easy to swallow foods that are high in calories and protein: casseroles, chicken/egg/tuna salad with extra garcia to add  calories and moisture, oatmeal/cream of wheat made with whole milk, cottage cheese, whole milk Greek yogurt, scrambled eggs with cheese, avocado, macaroni and cheese, mashed potatoes made with butter and whole milk or dry milk powder, ground meat with extra sauce/gravy, canned fruit, peanut butter, cream soups (cream of chicken, cream of mushroom, broccoli cheddar), pudding made with whole milk, custard, ice cream, banana, milkshakes/smoothies, Review page 47 of Eating Hints Book for more ideas.     TF plan - continue to aim for 6 cartons/day  TF Goal: 2 containers (474 mL) 3 times daily of Jevity 1.5  Water Flushin mL free water flush pre/post each bolus (360 mL water) + additional 150 mL water flushes 4 times daily (600 mL water).   TF provides: 1422 mL total volume (6 cartons), 2133 kcal, 91g protein, 1081 mL free water, 2041 mL total fluid  Allow for substitutions  Your syringes are each 60 mL or 2 fl oz.  Always flush your feeding tube with 60 mL room-temp water 1-2 times daily while feeding tube is not in use.  [facility] when you [facility] 10 days [facility] supplies: [phone], option 3 (customer support).      Follow Up Plan:  in ~2-3 weeks with Heaven Toledo  Recommend Referral to Other Providers: none at this time

## 2024-11-22 NOTE — Clinical Note
Hi! Maira recently finished her treatment. She is on tube feeds. Can you schedule a follow up with her in about 2-3 weeks? Thanks!

## 2024-11-22 NOTE — PATIENT INSTRUCTIONS
Nutrition Rx & Recommendations:  Diet: enteral nutrition as 1' source of nutrition  Stay hydrated by sipping fluids of choice/tolerance throughout the day.    Follow proper oral care; Try baking soda/salt water rinse recipe (mix 3/4 tsp salt + 1 tsp baking soda + 1 qt water; rinse with plain water after using) in Eating Hints book (pg 18).  Brush your teeth before/after meals & before bed.  Weigh yourself regularly. If you notice weight loss, make an effort to increase your daily food/calorie intake. If you continue to notice loss after these efforts, reach out to your dietitian to establish a plan to stabilize weight.   Pureed foods as able  Pureed Foods:  To puree foods: placed chopped food into  or . Add enough liquid to cover the blade. Blend until food is smooth and free of chunks.  You may add non-fat milk, clear broths or non-fat gravies when blending foods.  You can use baby food as long as you have a pureed protein source as the main part of your meal.  Avoid items with added sugars (read food labels).  Ideas for pureed foods: yogurt, cottage cheese, jello, pureed soups, applesauce, pureed fruit, baby food vegetables/pureed vegetables, mashed potatoes, oatmeal or cream of wheat (make with milk for more calories/protein)  If able to consume soft/moist foods, recommend : Soft/easy to swallow foods that are high in calories and protein: casseroles, chicken/egg/tuna salad with extra garcia to add calories and moisture, oatmeal/cream of wheat made with whole milk, cottage cheese, whole milk Greek yogurt, scrambled eggs with cheese, avocado, macaroni and cheese, mashed potatoes made with butter and whole milk or dry milk powder, ground meat with extra sauce/gravy, canned fruit, peanut butter, cream soups (cream of chicken, cream of mushroom, broccoli cheddar), pudding made with whole milk, custard, ice cream, banana, milkshakes/smoothies, Review page 47 of Eating Hints Book for more ideas.      TF plan - continue to aim for 6 cartons/day  TF Goal: 2 containers (474 mL) 3 times daily of Jevity 1.5  Water Flushin mL free water flush pre/post each bolus (360 mL water) + additional 150 mL water flushes 4 times daily (600 mL water).   TF provides: 1422 mL total volume (6 cartons), 2133 kcal, 91g protein, 1081 mL free water, 2041 mL total fluid  Allow for substitutions  Your syringes are each 60 mL or 2 fl oz.  Always flush your feeding tube with 60 mL room-temp water 1-2 times daily while feeding tube is not in use.  Call [facility] when you have 10 days left of TF supplies: [phone], option 3 (customer support).      Follow Up Plan: in ~2-3 weeks with Heaven Toledo  Recommend Referral to Other Providers: none at this time

## 2024-11-25 ENCOUNTER — TELEPHONE (OUTPATIENT)
Dept: NUTRITION | Facility: HOSPITAL | Age: 69
End: 2024-11-25

## 2024-11-25 ENCOUNTER — HOSPITAL ENCOUNTER (OUTPATIENT)
Dept: INFUSION CENTER | Facility: CLINIC | Age: 69
Discharge: HOME | End: 2024-11-25
Payer: MEDICARE

## 2024-11-25 VITALS
RESPIRATION RATE: 18 BRPM | HEART RATE: 99 BPM | TEMPERATURE: 97.5 F | SYSTOLIC BLOOD PRESSURE: 105 MMHG | DIASTOLIC BLOOD PRESSURE: 71 MMHG

## 2024-11-25 DIAGNOSIS — C50.411 MALIGNANT NEOPLASM OF UPPER-OUTER QUADRANT OF RIGHT BREAST IN FEMALE, ESTROGEN RECEPTOR POSITIVE (HCC): Primary | ICD-10-CM

## 2024-11-25 DIAGNOSIS — Z17.0 MALIGNANT NEOPLASM OF UPPER-OUTER QUADRANT OF RIGHT BREAST IN FEMALE, ESTROGEN RECEPTOR POSITIVE (HCC): Primary | ICD-10-CM

## 2024-11-25 LAB
ALBUMIN SERPL BCG-MCNC: 3.5 G/DL (ref 3.5–5)
ALP SERPL-CCNC: 121 U/L (ref 34–104)
ALT SERPL W P-5'-P-CCNC: 15 U/L (ref 7–52)
ANION GAP SERPL CALCULATED.3IONS-SCNC: 9 MMOL/L (ref 4–13)
AST SERPL W P-5'-P-CCNC: 29 U/L (ref 13–39)
BASOPHILS # BLD AUTO: 0.03 THOUSANDS/ΜL (ref 0–0.1)
BASOPHILS NFR BLD AUTO: 0 % (ref 0–1)
BILIRUB SERPL-MCNC: 0.3 MG/DL (ref 0.2–1)
BUN SERPL-MCNC: 35 MG/DL (ref 5–25)
CALCIUM SERPL-MCNC: 9.5 MG/DL (ref 8.4–10.2)
CHLORIDE SERPL-SCNC: 105 MMOL/L (ref 96–108)
CO2 SERPL-SCNC: 27 MMOL/L (ref 21–32)
CREAT SERPL-MCNC: 1.45 MG/DL (ref 0.6–1.3)
EOSINOPHIL # BLD AUTO: 0.03 THOUSAND/ΜL (ref 0–0.61)
EOSINOPHIL NFR BLD AUTO: 0 % (ref 0–6)
ERYTHROCYTE [DISTWIDTH] IN BLOOD BY AUTOMATED COUNT: 17.3 % (ref 11.6–15.1)
GFR SERPL CREATININE-BSD FRML MDRD: 37 ML/MIN/1.73SQ M
GLUCOSE SERPL-MCNC: 105 MG/DL (ref 65–140)
HCT VFR BLD AUTO: 28.9 % (ref 34.8–46.1)
HGB BLD-MCNC: 9.4 G/DL (ref 11.5–15.4)
IMM GRANULOCYTES # BLD AUTO: 0.04 THOUSAND/UL (ref 0–0.2)
IMM GRANULOCYTES NFR BLD AUTO: 1 % (ref 0–2)
LYMPHOCYTES # BLD AUTO: 0.87 THOUSANDS/ΜL (ref 0.6–4.47)
LYMPHOCYTES NFR BLD AUTO: 10 % (ref 14–44)
MAGNESIUM SERPL-MCNC: 1.8 MG/DL (ref 1.9–2.7)
MCH RBC QN AUTO: 29.3 PG (ref 26.8–34.3)
MCHC RBC AUTO-ENTMCNC: 32.5 G/DL (ref 31.4–37.4)
MCV RBC AUTO: 90 FL (ref 82–98)
MONOCYTES # BLD AUTO: 1.18 THOUSAND/ΜL (ref 0.17–1.22)
MONOCYTES NFR BLD AUTO: 13 % (ref 4–12)
NEUTROPHILS # BLD AUTO: 6.65 THOUSANDS/ΜL (ref 1.85–7.62)
NEUTS SEG NFR BLD AUTO: 76 % (ref 43–75)
NRBC BLD AUTO-RTO: 0 /100 WBCS
PLATELET # BLD AUTO: 353 THOUSANDS/UL (ref 149–390)
PMV BLD AUTO: 10.6 FL (ref 8.9–12.7)
POTASSIUM SERPL-SCNC: 3.8 MMOL/L (ref 3.5–5.3)
PROT SERPL-MCNC: 7.9 G/DL (ref 6.4–8.4)
RBC # BLD AUTO: 3.21 MILLION/UL (ref 3.81–5.12)
SODIUM SERPL-SCNC: 141 MMOL/L (ref 135–147)
WBC # BLD AUTO: 8.8 THOUSAND/UL (ref 4.31–10.16)

## 2024-11-25 PROCEDURE — 96365 THER/PROPH/DIAG IV INF INIT: CPT

## 2024-11-25 PROCEDURE — 83735 ASSAY OF MAGNESIUM: CPT | Performed by: INTERNAL MEDICINE

## 2024-11-25 PROCEDURE — 80053 COMPREHEN METABOLIC PANEL: CPT | Performed by: INTERNAL MEDICINE

## 2024-11-25 PROCEDURE — 96366 THER/PROPH/DIAG IV INF ADDON: CPT

## 2024-11-25 PROCEDURE — 85025 COMPLETE CBC W/AUTO DIFF WBC: CPT | Performed by: INTERNAL MEDICINE

## 2024-11-25 RX ADMIN — MAGNESIUM SULFATE HEPTAHYDRATE: 500 INJECTION, SOLUTION INTRAMUSCULAR; INTRAVENOUS at 13:31

## 2024-11-25 NOTE — TELEPHONE ENCOUNTER
Contacted pt to schedule follow up nutrition appointment. Pt did not answer. Voicemail was left explaining reason for call and call back number.

## 2024-11-25 NOTE — PLAN OF CARE
Problem: Potential for Falls  Goal: Patient will remain free of falls  Description: INTERVENTIONS:  - Educate patient/family on patient safety including physical limitations  - Instruct patient to call for assistance with activity   - Consult OT/PT to assist with strengthening/mobility   - Keep Call bell within reach  - Keep bed low and locked with side rails adjusted as appropriate  - Keep care items and personal belongings within reach  - Initiate and maintain comfort rounds  - Make Fall Risk Sign visible to staff  - Offer Toileting every  Hours, in advance of need  - Initiate/Maintain alarm  - Obtain necessary fall risk management equipment:   - Apply yellow socks and bracelet for high fall risk patients  - Consider moving patient to room near nurses station  11/25/2024 1335 by Miri Ochoa, RN  Outcome: Progressing  11/25/2024 1335 by Miri Ochoa, RN  Outcome: Progressing

## 2024-11-25 NOTE — PROGRESS NOTES
Maira Martell  tolerated 1L NS + 2g Mag well with no complications.      Maira Moura is aware of future appt on Wednesday Nov 27, 2024 11:00 AM.     AVS printed and given to Maira Moura.

## 2024-11-26 ENCOUNTER — TELEPHONE (OUTPATIENT)
Dept: HEMATOLOGY ONCOLOGY | Facility: CLINIC | Age: 69
End: 2024-11-26

## 2024-11-26 DIAGNOSIS — C18.9 METASTATIC COLON CANCER TO LIVER (HCC): Primary | ICD-10-CM

## 2024-11-26 DIAGNOSIS — C78.7 METASTATIC COLON CANCER TO LIVER (HCC): Primary | ICD-10-CM

## 2024-11-26 NOTE — TELEPHONE ENCOUNTER
Called PT for missed appt with Dr. Medina. No answer, LVM with hopeline number to call back and reschedule.

## 2024-11-26 NOTE — TELEPHONE ENCOUNTER
Spoke with patient to see how she is feeling since she missed her appt today. Patient states that she just woke up and her phone  this morning. I made sure that she was at home and not in the lobby, since her  states he dropped her off via round trip website. I advised her that I spoke with Dr. Medina and she would like to get a CT scan and then see her in follow-up. Patient verbalized understanding and will look forward to my call with scheduling details.

## 2024-11-27 ENCOUNTER — TELEPHONE (OUTPATIENT)
Dept: HEMATOLOGY ONCOLOGY | Facility: CLINIC | Age: 69
End: 2024-11-27

## 2024-11-27 ENCOUNTER — HOSPITAL ENCOUNTER (OUTPATIENT)
Dept: INFUSION CENTER | Facility: CLINIC | Age: 69
Discharge: HOME | End: 2024-11-27
Payer: MEDICARE

## 2024-11-27 VITALS
RESPIRATION RATE: 18 BRPM | SYSTOLIC BLOOD PRESSURE: 142 MMHG | HEART RATE: 105 BPM | TEMPERATURE: 97.6 F | DIASTOLIC BLOOD PRESSURE: 83 MMHG

## 2024-11-27 DIAGNOSIS — C50.411 MALIGNANT NEOPLASM OF UPPER-OUTER QUADRANT OF RIGHT BREAST IN FEMALE, ESTROGEN RECEPTOR POSITIVE (HCC): Primary | ICD-10-CM

## 2024-11-27 DIAGNOSIS — Z17.0 MALIGNANT NEOPLASM OF UPPER-OUTER QUADRANT OF RIGHT BREAST IN FEMALE, ESTROGEN RECEPTOR POSITIVE (HCC): Primary | ICD-10-CM

## 2024-11-27 PROBLEM — E83.42 HYPOMAGNESEMIA: Status: ACTIVE | Noted: 2024-11-27

## 2024-11-27 LAB
ALBUMIN SERPL BCG-MCNC: 3.4 G/DL (ref 3.5–5)
ALP SERPL-CCNC: 120 U/L (ref 34–104)
ALT SERPL W P-5'-P-CCNC: 18 U/L (ref 7–52)
ANION GAP SERPL CALCULATED.3IONS-SCNC: 8 MMOL/L (ref 4–13)
AST SERPL W P-5'-P-CCNC: 34 U/L (ref 13–39)
BASOPHILS # BLD AUTO: 0.02 THOUSANDS/ΜL (ref 0–0.1)
BASOPHILS NFR BLD AUTO: 0 % (ref 0–1)
BILIRUB SERPL-MCNC: 0.4 MG/DL (ref 0.2–1)
BUN SERPL-MCNC: 33 MG/DL (ref 5–25)
CALCIUM ALBUM COR SERPL-MCNC: 10.1 MG/DL (ref 8.3–10.1)
CALCIUM SERPL-MCNC: 9.6 MG/DL (ref 8.4–10.2)
CHLORIDE SERPL-SCNC: 107 MMOL/L (ref 96–108)
CO2 SERPL-SCNC: 27 MMOL/L (ref 21–32)
CREAT SERPL-MCNC: 1.16 MG/DL (ref 0.6–1.3)
EOSINOPHIL # BLD AUTO: 0.03 THOUSAND/ΜL (ref 0–0.61)
EOSINOPHIL NFR BLD AUTO: 1 % (ref 0–6)
ERYTHROCYTE [DISTWIDTH] IN BLOOD BY AUTOMATED COUNT: 17.9 % (ref 11.6–15.1)
GFR SERPL CREATININE-BSD FRML MDRD: 48 ML/MIN/1.73SQ M
GLUCOSE SERPL-MCNC: 111 MG/DL (ref 65–140)
HCT VFR BLD AUTO: 27 % (ref 34.8–46.1)
HGB BLD-MCNC: 8.8 G/DL (ref 11.5–15.4)
IMM GRANULOCYTES # BLD AUTO: 0.02 THOUSAND/UL (ref 0–0.2)
IMM GRANULOCYTES NFR BLD AUTO: 0 % (ref 0–2)
LYMPHOCYTES # BLD AUTO: 0.84 THOUSANDS/ΜL (ref 0.6–4.47)
LYMPHOCYTES NFR BLD AUTO: 14 % (ref 14–44)
MAGNESIUM SERPL-MCNC: 2 MG/DL (ref 1.9–2.7)
MCH RBC QN AUTO: 29.2 PG (ref 26.8–34.3)
MCHC RBC AUTO-ENTMCNC: 32.6 G/DL (ref 31.4–37.4)
MCV RBC AUTO: 90 FL (ref 82–98)
MONOCYTES # BLD AUTO: 0.85 THOUSAND/ΜL (ref 0.17–1.22)
MONOCYTES NFR BLD AUTO: 14 % (ref 4–12)
NEUTROPHILS # BLD AUTO: 4.4 THOUSANDS/ΜL (ref 1.85–7.62)
NEUTS SEG NFR BLD AUTO: 71 % (ref 43–75)
NRBC BLD AUTO-RTO: 0 /100 WBCS
PLATELET # BLD AUTO: 285 THOUSANDS/UL (ref 149–390)
PMV BLD AUTO: 10.5 FL (ref 8.9–12.7)
POTASSIUM SERPL-SCNC: 4 MMOL/L (ref 3.5–5.3)
PROT SERPL-MCNC: 7.7 G/DL (ref 6.4–8.4)
RBC # BLD AUTO: 3.01 MILLION/UL (ref 3.81–5.12)
SODIUM SERPL-SCNC: 142 MMOL/L (ref 135–147)
WBC # BLD AUTO: 6.16 THOUSAND/UL (ref 4.31–10.16)

## 2024-11-27 PROCEDURE — 80053 COMPREHEN METABOLIC PANEL: CPT | Performed by: INTERNAL MEDICINE

## 2024-11-27 PROCEDURE — 96366 THER/PROPH/DIAG IV INF ADDON: CPT

## 2024-11-27 PROCEDURE — 83735 ASSAY OF MAGNESIUM: CPT | Performed by: INTERNAL MEDICINE

## 2024-11-27 PROCEDURE — 96365 THER/PROPH/DIAG IV INF INIT: CPT

## 2024-11-27 PROCEDURE — 85025 COMPLETE CBC W/AUTO DIFF WBC: CPT | Performed by: INTERNAL MEDICINE

## 2024-11-27 RX ADMIN — MAGNESIUM SULFATE HEPTAHYDRATE: 500 INJECTION, SOLUTION INTRAMUSCULAR; INTRAVENOUS at 11:18

## 2024-11-27 NOTE — ADDENDUM NOTE
Encounter addended by: Harika Medina DO on: 11/27/2024 3:35 PM   Actions taken: Problem List modified, Clinical Note Signed

## 2024-11-27 NOTE — PLAN OF CARE
Problem: Potential for Falls  Goal: Patient will remain free of falls  Description: INTERVENTIONS:  - Educate patient/family on patient safety including physical limitations  - Instruct patient to call for assistance with activity   - Consult OT/PT to assist with strengthening/mobility   - Keep Call bell within reach  - Keep bed low and locked with side rails adjusted as appropriate  - Keep care items and personal belongings within reach  - Initiate and maintain comfort rounds  - Consider moving patient to room near nurses station  Outcome: Completed      drained spontaneously

## 2024-11-27 NOTE — PROGRESS NOTES
Pt tolerated hydration without incident.  Pt declined AVS but is aware of her appt on Friday at 0800

## 2024-11-27 NOTE — PROGRESS NOTES
Pt has last appt for hydration on Friday, Nov. 29.  Contacted Neena Cox RN to see if pt needs more appts going forward.  Neena stated she would have Hanane f/u on this on Friday and let us know if pt should be scheduled for any more hydration appts.

## 2024-11-29 ENCOUNTER — HOSPITAL ENCOUNTER (OUTPATIENT)
Dept: INFUSION CENTER | Facility: CLINIC | Age: 69
End: 2024-11-29
Payer: MEDICARE

## 2024-11-29 ENCOUNTER — TELEPHONE (OUTPATIENT)
Dept: HEMATOLOGY ONCOLOGY | Facility: CLINIC | Age: 69
End: 2024-11-29

## 2024-11-29 VITALS
DIASTOLIC BLOOD PRESSURE: 86 MMHG | HEART RATE: 98 BPM | TEMPERATURE: 97.1 F | SYSTOLIC BLOOD PRESSURE: 115 MMHG | RESPIRATION RATE: 18 BRPM

## 2024-11-29 DIAGNOSIS — C50.411 MALIGNANT NEOPLASM OF UPPER-OUTER QUADRANT OF RIGHT BREAST IN FEMALE, ESTROGEN RECEPTOR POSITIVE (HCC): Primary | ICD-10-CM

## 2024-11-29 DIAGNOSIS — Z17.0 MALIGNANT NEOPLASM OF UPPER-OUTER QUADRANT OF RIGHT BREAST IN FEMALE, ESTROGEN RECEPTOR POSITIVE (HCC): Primary | ICD-10-CM

## 2024-11-29 LAB
ALBUMIN SERPL BCG-MCNC: 3.5 G/DL (ref 3.5–5)
ALP SERPL-CCNC: 115 U/L (ref 34–104)
ALT SERPL W P-5'-P-CCNC: 15 U/L (ref 7–52)
ANION GAP SERPL CALCULATED.3IONS-SCNC: 6 MMOL/L (ref 4–13)
AST SERPL W P-5'-P-CCNC: 27 U/L (ref 13–39)
BASOPHILS # BLD AUTO: 0.02 THOUSANDS/ΜL (ref 0–0.1)
BASOPHILS NFR BLD AUTO: 0 % (ref 0–1)
BILIRUB SERPL-MCNC: 0.34 MG/DL (ref 0.2–1)
BUN SERPL-MCNC: 27 MG/DL (ref 5–25)
CALCIUM SERPL-MCNC: 9.9 MG/DL (ref 8.4–10.2)
CHLORIDE SERPL-SCNC: 107 MMOL/L (ref 96–108)
CO2 SERPL-SCNC: 28 MMOL/L (ref 21–32)
CREAT SERPL-MCNC: 1.09 MG/DL (ref 0.6–1.3)
EOSINOPHIL # BLD AUTO: 0.04 THOUSAND/ΜL (ref 0–0.61)
EOSINOPHIL NFR BLD AUTO: 1 % (ref 0–6)
ERYTHROCYTE [DISTWIDTH] IN BLOOD BY AUTOMATED COUNT: 18.1 % (ref 11.6–15.1)
GFR SERPL CREATININE-BSD FRML MDRD: 52 ML/MIN/1.73SQ M
GLUCOSE SERPL-MCNC: 107 MG/DL (ref 65–140)
HCT VFR BLD AUTO: 26.3 % (ref 34.8–46.1)
HGB BLD-MCNC: 8.9 G/DL (ref 11.5–15.4)
IMM GRANULOCYTES # BLD AUTO: 0.03 THOUSAND/UL (ref 0–0.2)
IMM GRANULOCYTES NFR BLD AUTO: 0 % (ref 0–2)
LYMPHOCYTES # BLD AUTO: 0.77 THOUSANDS/ΜL (ref 0.6–4.47)
LYMPHOCYTES NFR BLD AUTO: 11 % (ref 14–44)
MAGNESIUM SERPL-MCNC: 1.9 MG/DL (ref 1.9–2.7)
MCH RBC QN AUTO: 30.7 PG (ref 26.8–34.3)
MCHC RBC AUTO-ENTMCNC: 33.8 G/DL (ref 31.4–37.4)
MCV RBC AUTO: 91 FL (ref 82–98)
MONOCYTES # BLD AUTO: 0.86 THOUSAND/ΜL (ref 0.17–1.22)
MONOCYTES NFR BLD AUTO: 12 % (ref 4–12)
NEUTROPHILS # BLD AUTO: 5.49 THOUSANDS/ΜL (ref 1.85–7.62)
NEUTS SEG NFR BLD AUTO: 76 % (ref 43–75)
NRBC BLD AUTO-RTO: 0 /100 WBCS
PLATELET # BLD AUTO: 302 THOUSANDS/UL (ref 149–390)
PMV BLD AUTO: 10.7 FL (ref 8.9–12.7)
POTASSIUM SERPL-SCNC: 4 MMOL/L (ref 3.5–5.3)
PROT SERPL-MCNC: 7.8 G/DL (ref 6.4–8.4)
RBC # BLD AUTO: 2.9 MILLION/UL (ref 3.81–5.12)
SODIUM SERPL-SCNC: 141 MMOL/L (ref 135–147)
WBC # BLD AUTO: 7.21 THOUSAND/UL (ref 4.31–10.16)

## 2024-11-29 PROCEDURE — 96366 THER/PROPH/DIAG IV INF ADDON: CPT

## 2024-11-29 PROCEDURE — 96365 THER/PROPH/DIAG IV INF INIT: CPT

## 2024-11-29 PROCEDURE — 80053 COMPREHEN METABOLIC PANEL: CPT | Performed by: INTERNAL MEDICINE

## 2024-11-29 PROCEDURE — 83735 ASSAY OF MAGNESIUM: CPT | Performed by: INTERNAL MEDICINE

## 2024-11-29 PROCEDURE — 85025 COMPLETE CBC W/AUTO DIFF WBC: CPT | Performed by: INTERNAL MEDICINE

## 2024-11-29 RX ADMIN — MAGNESIUM SULFATE HEPTAHYDRATE: 500 INJECTION, SOLUTION INTRAMUSCULAR; INTRAVENOUS at 08:48

## 2024-11-29 NOTE — TELEPHONE ENCOUNTER
Attempted to call patient for the second time to schedule nutrition follow up. No answer. Left voicemail with reason for call and contact information.

## 2024-11-29 NOTE — PROGRESS NOTES
Maira Moura  tolerated Hydration + 2g Mag well with no complications. Reached out to [facility] regarding continued hydration orders, and based on patient's current reported intake of 16 fl oz/day, she will continue to be scheduled out for 2-3 hydrations per week. Pt educated on increased fl intake to at least 64.  Pt will be scheduled for future appointments by scheduling.     AVS declined by Maira Moura.

## 2024-12-02 ENCOUNTER — HOSPITAL ENCOUNTER (OUTPATIENT)
Dept: INFUSION CENTER | Facility: CLINIC | Age: 69
Discharge: HOME | End: 2024-12-02
Payer: MEDICARE

## 2024-12-02 VITALS
TEMPERATURE: 97.3 F | DIASTOLIC BLOOD PRESSURE: 79 MMHG | RESPIRATION RATE: 18 BRPM | SYSTOLIC BLOOD PRESSURE: 110 MMHG | HEART RATE: 105 BPM

## 2024-12-02 DIAGNOSIS — Z17.0 MALIGNANT NEOPLASM OF UPPER-OUTER QUADRANT OF RIGHT BREAST IN FEMALE, ESTROGEN RECEPTOR POSITIVE (HCC): Primary | ICD-10-CM

## 2024-12-02 DIAGNOSIS — C50.411 MALIGNANT NEOPLASM OF UPPER-OUTER QUADRANT OF RIGHT BREAST IN FEMALE, ESTROGEN RECEPTOR POSITIVE (HCC): Primary | ICD-10-CM

## 2024-12-02 LAB
ALBUMIN SERPL BCG-MCNC: 3.4 G/DL (ref 3.5–5)
ALP SERPL-CCNC: 110 U/L (ref 34–104)
ALT SERPL W P-5'-P-CCNC: 12 U/L (ref 7–52)
ANION GAP SERPL CALCULATED.3IONS-SCNC: 7 MMOL/L (ref 4–13)
AST SERPL W P-5'-P-CCNC: 28 U/L (ref 13–39)
BASOPHILS # BLD AUTO: 0.03 THOUSANDS/ΜL (ref 0–0.1)
BASOPHILS NFR BLD AUTO: 1 % (ref 0–1)
BILIRUB SERPL-MCNC: 0.43 MG/DL (ref 0.2–1)
BUN SERPL-MCNC: 24 MG/DL (ref 5–25)
CALCIUM ALBUM COR SERPL-MCNC: 10.1 MG/DL (ref 8.3–10.1)
CALCIUM SERPL-MCNC: 9.6 MG/DL (ref 8.4–10.2)
CHLORIDE SERPL-SCNC: 104 MMOL/L (ref 96–108)
CO2 SERPL-SCNC: 29 MMOL/L (ref 21–32)
CREAT SERPL-MCNC: 1.16 MG/DL (ref 0.6–1.3)
EOSINOPHIL # BLD AUTO: 0.03 THOUSAND/ΜL (ref 0–0.61)
EOSINOPHIL NFR BLD AUTO: 1 % (ref 0–6)
ERYTHROCYTE [DISTWIDTH] IN BLOOD BY AUTOMATED COUNT: 18.1 % (ref 11.6–15.1)
GFR SERPL CREATININE-BSD FRML MDRD: 48 ML/MIN/1.73SQ M
GLUCOSE SERPL-MCNC: 112 MG/DL (ref 65–140)
HCT VFR BLD AUTO: 26.2 % (ref 34.8–46.1)
HGB BLD-MCNC: 8.5 G/DL (ref 11.5–15.4)
IMM GRANULOCYTES # BLD AUTO: 0.02 THOUSAND/UL (ref 0–0.2)
IMM GRANULOCYTES NFR BLD AUTO: 0 % (ref 0–2)
LYMPHOCYTES # BLD AUTO: 0.57 THOUSANDS/ΜL (ref 0.6–4.47)
LYMPHOCYTES NFR BLD AUTO: 10 % (ref 14–44)
MAGNESIUM SERPL-MCNC: 1.8 MG/DL (ref 1.9–2.7)
MCH RBC QN AUTO: 29.3 PG (ref 26.8–34.3)
MCHC RBC AUTO-ENTMCNC: 32.4 G/DL (ref 31.4–37.4)
MCV RBC AUTO: 90 FL (ref 82–98)
MONOCYTES # BLD AUTO: 0.64 THOUSAND/ΜL (ref 0.17–1.22)
MONOCYTES NFR BLD AUTO: 11 % (ref 4–12)
NEUTROPHILS # BLD AUTO: 4.69 THOUSANDS/ΜL (ref 1.85–7.62)
NEUTS SEG NFR BLD AUTO: 77 % (ref 43–75)
NRBC BLD AUTO-RTO: 0 /100 WBCS
PLATELET # BLD AUTO: 289 THOUSANDS/UL (ref 149–390)
PMV BLD AUTO: 10.5 FL (ref 8.9–12.7)
POTASSIUM SERPL-SCNC: 3.8 MMOL/L (ref 3.5–5.3)
PROT SERPL-MCNC: 7.4 G/DL (ref 6.4–8.4)
RBC # BLD AUTO: 2.9 MILLION/UL (ref 3.81–5.12)
SODIUM SERPL-SCNC: 140 MMOL/L (ref 135–147)
WBC # BLD AUTO: 5.98 THOUSAND/UL (ref 4.31–10.16)

## 2024-12-02 PROCEDURE — 83735 ASSAY OF MAGNESIUM: CPT | Performed by: INTERNAL MEDICINE

## 2024-12-02 PROCEDURE — 96360 HYDRATION IV INFUSION INIT: CPT

## 2024-12-02 PROCEDURE — 80053 COMPREHEN METABOLIC PANEL: CPT | Performed by: INTERNAL MEDICINE

## 2024-12-02 PROCEDURE — 85025 COMPLETE CBC W/AUTO DIFF WBC: CPT | Performed by: INTERNAL MEDICINE

## 2024-12-02 RX ADMIN — SODIUM CHLORIDE 1000 ML: 0.9 INJECTION, SOLUTION INTRAVENOUS at 13:04

## 2024-12-02 NOTE — PROGRESS NOTES
Maiar Moura  tolerated hydration well with no complications.      Maira Moura is aware of future appt on Wednesday Dec 4, 2024 9:30 AM.     AVS declined by Maira Moura.

## 2024-12-03 ENCOUNTER — HOSPITAL ENCOUNTER (OUTPATIENT)
Dept: RADIOLOGY | Age: 69
Discharge: HOME | End: 2024-12-03
Payer: MEDICARE

## 2024-12-03 DIAGNOSIS — C18.9 METASTATIC COLON CANCER TO LIVER (HCC): ICD-10-CM

## 2024-12-03 DIAGNOSIS — C78.7 METASTATIC COLON CANCER TO LIVER (HCC): ICD-10-CM

## 2024-12-03 PROCEDURE — 74177 CT ABD & PELVIS W/CONTRAST: CPT

## 2024-12-03 PROCEDURE — 71260 CT THORAX DX C+: CPT

## 2024-12-03 RX ADMIN — IOHEXOL 99 ML: 350 INJECTION, SOLUTION INTRAVENOUS at 12:54

## 2024-12-04 ENCOUNTER — HOSPITAL ENCOUNTER (OUTPATIENT)
Dept: INFUSION CENTER | Facility: CLINIC | Age: 69
Discharge: HOME | End: 2024-12-04
Payer: MEDICARE

## 2024-12-04 VITALS
HEART RATE: 101 BPM | SYSTOLIC BLOOD PRESSURE: 116 MMHG | TEMPERATURE: 97 F | RESPIRATION RATE: 18 BRPM | DIASTOLIC BLOOD PRESSURE: 80 MMHG

## 2024-12-04 DIAGNOSIS — C50.411 MALIGNANT NEOPLASM OF UPPER-OUTER QUADRANT OF RIGHT BREAST IN FEMALE, ESTROGEN RECEPTOR POSITIVE (HCC): Primary | ICD-10-CM

## 2024-12-04 DIAGNOSIS — Z17.0 MALIGNANT NEOPLASM OF UPPER-OUTER QUADRANT OF RIGHT BREAST IN FEMALE, ESTROGEN RECEPTOR POSITIVE (HCC): Primary | ICD-10-CM

## 2024-12-04 LAB
ALBUMIN SERPL BCG-MCNC: 3.3 G/DL (ref 3.5–5)
ALP SERPL-CCNC: 102 U/L (ref 34–104)
ALT SERPL W P-5'-P-CCNC: 11 U/L (ref 7–52)
ANION GAP SERPL CALCULATED.3IONS-SCNC: 9 MMOL/L (ref 4–13)
AST SERPL W P-5'-P-CCNC: 28 U/L (ref 13–39)
BASOPHILS # BLD AUTO: 0.02 THOUSANDS/ΜL (ref 0–0.1)
BASOPHILS NFR BLD AUTO: 0 % (ref 0–1)
BILIRUB SERPL-MCNC: 0.41 MG/DL (ref 0.2–1)
BUN SERPL-MCNC: 20 MG/DL (ref 5–25)
CALCIUM ALBUM COR SERPL-MCNC: 9.6 MG/DL (ref 8.3–10.1)
CALCIUM SERPL-MCNC: 9 MG/DL (ref 8.4–10.2)
CHLORIDE SERPL-SCNC: 104 MMOL/L (ref 96–108)
CO2 SERPL-SCNC: 25 MMOL/L (ref 21–32)
CREAT SERPL-MCNC: 1.02 MG/DL (ref 0.6–1.3)
EOSINOPHIL # BLD AUTO: 0.05 THOUSAND/ΜL (ref 0–0.61)
EOSINOPHIL NFR BLD AUTO: 1 % (ref 0–6)
ERYTHROCYTE [DISTWIDTH] IN BLOOD BY AUTOMATED COUNT: 17.4 % (ref 11.6–15.1)
GFR SERPL CREATININE-BSD FRML MDRD: 56 ML/MIN/1.73SQ M
GLUCOSE SERPL-MCNC: 89 MG/DL (ref 65–140)
HCT VFR BLD AUTO: 24.4 % (ref 34.8–46.1)
HGB BLD-MCNC: 8 G/DL (ref 11.5–15.4)
IMM GRANULOCYTES # BLD AUTO: 0.03 THOUSAND/UL (ref 0–0.2)
IMM GRANULOCYTES NFR BLD AUTO: 1 % (ref 0–2)
LYMPHOCYTES # BLD AUTO: 0.44 THOUSANDS/ΜL (ref 0.6–4.47)
LYMPHOCYTES NFR BLD AUTO: 7 % (ref 14–44)
MAGNESIUM SERPL-MCNC: 1.6 MG/DL (ref 1.9–2.7)
MCH RBC QN AUTO: 29.1 PG (ref 26.8–34.3)
MCHC RBC AUTO-ENTMCNC: 32.8 G/DL (ref 31.4–37.4)
MCV RBC AUTO: 89 FL (ref 82–98)
MONOCYTES # BLD AUTO: 0.65 THOUSAND/ΜL (ref 0.17–1.22)
MONOCYTES NFR BLD AUTO: 10 % (ref 4–12)
NEUTROPHILS # BLD AUTO: 5.2 THOUSANDS/ΜL (ref 1.85–7.62)
NEUTS SEG NFR BLD AUTO: 81 % (ref 43–75)
NRBC BLD AUTO-RTO: 0 /100 WBCS
PLATELET # BLD AUTO: 259 THOUSANDS/UL (ref 149–390)
PMV BLD AUTO: 10.4 FL (ref 8.9–12.7)
POTASSIUM SERPL-SCNC: 3.8 MMOL/L (ref 3.5–5.3)
PROT SERPL-MCNC: 7 G/DL (ref 6.4–8.4)
RBC # BLD AUTO: 2.75 MILLION/UL (ref 3.81–5.12)
SODIUM SERPL-SCNC: 138 MMOL/L (ref 135–147)
WBC # BLD AUTO: 6.39 THOUSAND/UL (ref 4.31–10.16)

## 2024-12-04 PROCEDURE — 96360 HYDRATION IV INFUSION INIT: CPT

## 2024-12-04 PROCEDURE — 80053 COMPREHEN METABOLIC PANEL: CPT | Performed by: INTERNAL MEDICINE

## 2024-12-04 PROCEDURE — 85025 COMPLETE CBC W/AUTO DIFF WBC: CPT | Performed by: INTERNAL MEDICINE

## 2024-12-04 PROCEDURE — 83735 ASSAY OF MAGNESIUM: CPT | Performed by: INTERNAL MEDICINE

## 2024-12-04 RX ORDER — MIRTAZAPINE 15 MG/1
15 TABLET, FILM COATED ORAL
Qty: 30 TABLET | Refills: 0 | Status: SHIPPED | OUTPATIENT
Start: 2024-12-04

## 2024-12-04 RX ORDER — MIRTAZAPINE 30 MG/1
30 TABLET, FILM COATED ORAL
Qty: 30 TABLET | Refills: 0 | Status: SHIPPED | OUTPATIENT
Start: 2024-12-04

## 2024-12-04 RX ORDER — GABAPENTIN 300 MG/1
CAPSULE ORAL
Qty: 120 CAPSULE | Refills: 0 | Status: SHIPPED | OUTPATIENT
Start: 2024-12-04

## 2024-12-04 RX ADMIN — SODIUM CHLORIDE 1000 ML: 0.9 INJECTION, SOLUTION INTRAVENOUS at 10:03

## 2024-12-05 ENCOUNTER — TELEPHONE (OUTPATIENT)
Dept: HEMATOLOGY ONCOLOGY | Facility: CLINIC | Age: 69
End: 2024-12-05

## 2024-12-05 ENCOUNTER — TELEPHONE (OUTPATIENT)
Dept: NUTRITION | Facility: HOSPITAL | Age: 69
End: 2024-12-05

## 2024-12-05 NOTE — TELEPHONE ENCOUNTER
Third attempt to contact patient to schedule nutrition follow up. No answer. Left voicemail with my contact information.

## 2024-12-06 ENCOUNTER — OFFICE VISIT (OUTPATIENT)
Dept: HEMATOLOGY ONCOLOGY | Facility: CLINIC | Age: 69
End: 2024-12-06
Payer: MEDICARE

## 2024-12-06 ENCOUNTER — TELEPHONE (OUTPATIENT)
Dept: HEMATOLOGY ONCOLOGY | Facility: CLINIC | Age: 69
End: 2024-12-06

## 2024-12-06 ENCOUNTER — DOCUMENTATION (OUTPATIENT)
Dept: HEMATOLOGY ONCOLOGY | Facility: CLINIC | Age: 69
End: 2024-12-06

## 2024-12-06 VITALS
BODY MASS INDEX: 29.89 KG/M2 | SYSTOLIC BLOOD PRESSURE: 118 MMHG | HEART RATE: 100 BPM | DIASTOLIC BLOOD PRESSURE: 78 MMHG | OXYGEN SATURATION: 97 % | HEIGHT: 66 IN | WEIGHT: 186 LBS | RESPIRATION RATE: 16 BRPM | TEMPERATURE: 97.9 F

## 2024-12-06 DIAGNOSIS — C09.9 RIGHT TONSILLAR SQUAMOUS CELL CARCINOMA (HCC): ICD-10-CM

## 2024-12-06 DIAGNOSIS — C78.7 METASTATIC COLON CANCER TO LIVER (HCC): Primary | ICD-10-CM

## 2024-12-06 DIAGNOSIS — C18.9 METASTATIC COLON CANCER TO LIVER (HCC): Primary | ICD-10-CM

## 2024-12-06 PROCEDURE — 99214 OFFICE O/P EST MOD 30 MIN: CPT | Performed by: INTERNAL MEDICINE

## 2024-12-06 PROCEDURE — G2211 COMPLEX E/M VISIT ADD ON: HCPCS | Performed by: INTERNAL MEDICINE

## 2024-12-06 NOTE — PROGRESS NOTES
In-basket message received from Dr. Medina to add patient to the next available lower GI [facility] on 12/26/2024. Chart reviewed and prep completed.

## 2024-12-08 NOTE — PROGRESS NOTES
Name: Maira Moura      : 1955      MRN: [id number]  Encounter Provider: Harika Medina DO  Encounter Date: 2024   Encounter department: [facility] [place]  :  Assessment & Plan  Metastatic colon cancer to liver (HCC)         Right tonsillar squamous cell carcinoma (HCC)         69-year-old female with a remote history of breast cancer now dealing with squamous of carcinoma of the tonsil that is at least locally advanced and metastatic colon cancer.  I am concerned about her CT findings.  I do think this represents progression of systemic disease.  I am going to get a PET scan to see exactly how much of the disease looks active and what might be more consistent with prior liver directed therapies.  I am going to present her case again at our lower GI tumor board.  At some point I am going to need to restart systemic therapy and will discuss with the tumor board what potential biopsies of her disease could be done prior to starting any systemic therapy.  Right now she is still recovering from her head neck radiation and we need to give her more of a break.  We would absolutely not start anything before the holidays.  I will plan on seeing her back in about a month.  We will continue with 3 times weekly hydration and electrolyte replacement as needed.  She is lior continue to work on her food intake by mouth.  She has a good understanding of the importance of keeping those muscles from getting atrophied due to not being used.  She knows to call in the interim with any questions or concerns.    History of Present Illness   Chief Complaint   Patient presents with    Follow-up   Maira Moura is a 69 y.o. female with a history of breast cancer, squamous cell carcinoma of the tonsil, metastatic colon cancer.  She is continuing to recover from chemotherapy and radiation to the tonsil and cervical lymph nodes.  She still has a sore throat.  She is taking oxycodone  and getting about 6 hours of relief from it.  It is in liquid form.  She is taking the majority of her nutrition through her feeding tube.  She does notice that her G-tube slides out a few inches at times.  She can take very little by mouth but is trying to swallow things on a regular basis to keep the muscles of the upper throat and airway from getting atrophied.  Thick liquids like Ensure make her cough..  She is eating solid foods like grits oatmeal and Jell-O.  She can drink water.  She is coming in 3 times a week for additional IV hydration and electrolyte replacement.  She denies any abdominal pain.  Her neuropathy is getting slightly better with the break off of FOLFOX.  She had a CT of the chest abdomen pelvis prior to this visit.  There is a capsular cystic and solid component/implant in the liver.  Segment 8 lesion looks smaller segment 6 lesion is bigger.  She also has a new mass adjacent to the cervix.       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, unresectable     7/31/2023 - FOLFOX     11/20/2023 - opdivo added to FOLFOX     12/18/2023-cycle 11-20% dose reduction of 5-FU bolus and oxaliplatin due to increased fatigue     Jan 2024 - oxaliplatin removed from treatment plan due to neuropathy - PET scan shows good disease control in all areas except colon lesion     3/2024 - right hemicolectomy - pT3N0     6/3/24 - cryoablation to liver (no interruption to systemic therapy)    7/30/2024 - stop folfox    9/2024 - 10/2024 - cis/RT to tonsils and cervical LNs     Malignant neoplasm of right female breast (HCC)   11/23/2018 Initial Diagnosis    Malignant neoplasm of right female breast (HCC)     11/23/2018 Biopsy    Rt Breast US BX 1100 8cmfn, 4 passes 12g  "Shiraz:  - Invasive breast carcinoma of no special type (ductal NST/invasive ductal carcinoma).  - grade 2  - %  - DE 95%  - HER2 negative     12/20/2018 Surgery    Right partial mastectomy and SLN biopsy:  Infiltrating ductal carcinoma, Grade 2/3, felt to be between 2.0 cm and 2.5 cm in greatest dimension  - No invasive tumor is seen at inked margins, but there is a focus of DCIS seen within approximately 1mm of the inked medial margin  - no LVI  - no LCIS  - 0/2 LN's  at least Stage IIA - pT2, pN0, cM0, G2.    - Dr Coronado     12/20/2018 -  Cancer Staged    Stage IIA - pT2, pN0, cM0, G2.       1/4/2019 Genomic Testing    Oncotype DX score: 13     2/1/2019 -  Hormone Therapy    anastrozole 1 mg daily as adjuvant endocrine therapy    - Dr Daley       2/14/2019 - 4/3/2019 Radiation    Course: C1    Plan ID Energy Fractions Dose per Fraction (cGy) Dose Correction (cGy) Total Dose Delivered (cGy) Elapsed Days   R Breast 10X/6X 25 / 25 200 0 5,000 40   R BRST BOOST 16E 5 / 5 200 0 1,000 7      Treatment dates:  C1: 2/14/2019 - 4/3/2019       Metastatic colon cancer to liver (HCC)   7/6/2023 Genetic Testing    CARIS Results:   KRAS Pathogenic Variant Exon 2  p.G12D : lack of benefit of cetuximab, panitumumab Level 2   No other actionable mutation; MSI stable; TMB low   MiFOLOXAi Results: \"Decreased Benefit of FOLFOX + Bevacizumab in first line metastatic CRC.  This patient may achieve improved results by receiving an alternative to FOLFOX, such as FOLFIRI, as their initial regimen. As an adjustment to frontline FOLFOXIRI following toxicity: This patient may achieve improved results by removing the oxaliplatin portion of their regimen\".         7/11/2023 Initial Diagnosis    Metastatic colon cancer to liver (HCC)     7/13/2023 -  Cancer Staged    Staging form: Colon and Rectum, AJCC 8th Edition  - Clinical stage from 7/13/2023: cT3, cN0, pM1 - Signed by Harika Medina DO on 9/28/2023  Total positive nodes: " 0       7/31/2023 - 8/1/2024 Chemotherapy    cyanocobalamin, 1,000 mcg, Intramuscular, Every 30 days, 7 of 9 cycles  Administration: 1,000 mcg (5/9/2024), 1,000 mcg (5/24/2024), 1,000 mcg (6/20/2024), 1,000 mcg (7/3/2024), 1,000 mcg (7/18/2024), 1,000 mcg (8/1/2024)  potassium chloride, 20 mEq, Intravenous, Once, 2 of 2 cycles  Administration: 20 mEq (11/6/2023), 20 mEq (11/20/2023)  alteplase (CATHFLO), 2 mg, Intracatheter, Every 1 Minute as needed, 21 of 23 cycles  Administration: 2 mg (1/3/2024)  pegfilgrastim (NEULASTA), 6 mg, Subcutaneous, Once, 12 of 12 cycles  Administration: 6 mg (10/25/2023), 6 mg (11/8/2023), 6 mg (11/22/2023), 6 mg (12/6/2023), 6 mg (12/20/2023), 6 mg (1/12/2024), 6 mg (1/25/2024), 6 mg (4/25/2024), 6 mg (5/9/2024), 6 mg (5/24/2024), 6 mg (6/20/2024)  fluorouracil (ADRUCIL), 895 mg, Intravenous, Once, 21 of 23 cycles  Dose modification: 320 mg/m2 (original dose 400 mg/m2, Cycle 11)  Administration: 900 mg (7/31/2023), 900 mg (8/14/2023), 900 mg (8/28/2023), 900 mg (9/11/2023), 900 mg (9/25/2023), 900 mg (10/9/2023), 900 mg (10/23/2023), 895 mg (11/6/2023), 895 mg (11/20/2023), 895 mg (12/4/2023), 700 mg (12/18/2023), 680 mg (1/10/2024), 680 mg (1/23/2024), 625 mg (4/23/2024), 625 mg (5/7/2024), 625 mg (5/22/2024), 625 mg (6/4/2024), 625 mg (6/18/2024), 625 mg (7/1/2024), 625 mg (7/16/2024), 625 mg (7/30/2024)  nivolumab (OPDIVO) IVPB, 240 mg (100 % of original dose 240 mg), Intravenous, Once, 13 of 15 cycles  Dose modification: 240 mg (original dose 240 mg, Cycle 9)  Administration: 240 mg (11/20/2023), 240 mg (12/4/2023), 240 mg (12/18/2023), 240 mg (1/10/2024), 240 mg (1/23/2024), 240 mg (4/23/2024), 240 mg (5/7/2024), 240 mg (5/22/2024), 240 mg (6/4/2024), 240 mg (6/18/2024), 240 mg (7/1/2024), 240 mg (7/16/2024), 240 mg (7/30/2024)  leucovorin calcium IVPB, 896 mg, Intravenous, Once, 21 of 23 cycles  Administration: 900 mg (7/31/2023), 900 mg (8/14/2023), 900 mg (8/28/2023), 900 mg  (9/11/2023), 900 mg (9/25/2023), 900 mg (10/9/2023), 900 mg (10/23/2023), 900 mg (11/6/2023), 900 mg (11/20/2023), 900 mg (12/4/2023), 900 mg (12/18/2023), 850 mg (1/10/2024), 850 mg (1/23/2024), 800 mg (4/23/2024), 800 mg (5/7/2024), 800 mg (5/22/2024), 800 mg (6/4/2024), 800 mg (6/18/2024), 800 mg (7/1/2024), 800 mg (7/16/2024), 800 mg (7/30/2024)  oxaliplatin (ELOXATIN) chemo infusion, 85 mg/m2 = 190.4 mg, Intravenous, Once, 12 of 12 cycles  Dose modification: 68 mg/m2 (original dose 85 mg/m2, Cycle 11, Reason: Other (Must fill in a comment), Comment: increased fatigue)  Administration: 190.4 mg (7/31/2023), 190.4 mg (8/14/2023), 200 mg (8/28/2023), 200 mg (9/11/2023), 200 mg (9/25/2023), 200 mg (10/9/2023), 200 mg (10/23/2023), 200 mg (11/6/2023), 200 mg (11/20/2023), 190.4 mg (12/4/2023), 150 mg (12/18/2023), 150 mg (1/10/2024)  fluorouracil (ADRUCIL) ambulatory infusion Soln, 1,200 mg/m2/day = 5,375 mg, Intravenous, Over 46 hours, 21 of 23 cycles     9/26/2023 -  Cancer Staged    Staging form: Colon and Rectum, AJCC 8th Edition  - Pathologic: pT3, pN0, cM1 - Signed by Vickie Israel MD on 4/3/2024  Total positive nodes: 0       3/12/2024 Surgery    A. Terminal ileum, appendix, right colon (right hemicolectomy):  - Adenocarcinoma (5.2cm)  - Forty-nine (49) lymph nodes negative for carcinoma (0/49)    - Acellular mucin present in one (1) lymph node   - See staging synoptic (ypT3N0)     Right tonsillar squamous cell carcinoma (HCC)   7/27/2023 Initial Diagnosis    Right tonsillar squamous cell carcinoma (HCC)     7/27/2023 -  Cancer Staged    Staging form: Pharynx - Oropharynx, AJCC 8th Edition  - Clinical stage from 7/27/2023: Stage III (cT1, cN3, cM0, p16+) - Signed by Evan Plummer MD on 7/27/2023  Stage prefix: Initial diagnosis       9/17/2024 - 10/21/2024 Chemotherapy    alteplase (CATHFLO), 2 mg, Intracatheter, Every 1 Minute as needed, 6 of 6 cycles  Administration: 2 mg  "(10/21/2024)  CISplatin (PLATINOL) infusion, 70 mg (original dose 40 mg/m2), Intravenous, Once, 6 of 6 cycles  Dose modification: 70 mg (original dose 40 mg/m2, Cycle 1, Reason: Anticipated Tolerance), 30 mg/m2 (original dose 40 mg/m2, Cycle 4, Reason: Neuropathy, Comment: dose reduction to 30 mg/m2 due to neuropathy)  Administration: 70 mg (9/17/2024), 70 mg (9/23/2024), 70 mg (9/30/2024), 59.1 mg (10/7/2024), 59.1 mg (10/14/2024), 59.1 mg (10/21/2024)  sodium chloride, 500 mL, Intravenous, Once, 6 of 6 cycles  Administration: 500 mL (9/17/2024), 500 mL (9/17/2024), 500 mL (9/23/2024), 500 mL (9/23/2024), 500 mL (9/30/2024), 500 mL (9/30/2024), 500 mL (10/7/2024), 500 mL (10/7/2024), 500 mL (10/14/2024), 500 mL (10/14/2024), 500 mL (10/21/2024)  fosaprepitant (EMEND) IVPB, 150 mg, Intravenous, Once, 6 of 6 cycles  Administration: 150 mg (9/17/2024), 150 mg (9/23/2024), 150 mg (9/30/2024), 150 mg (10/7/2024), 150 mg (10/14/2024), 150 mg (10/21/2024)  IVPB builder, , Intravenous, Once, 1 of 1 cycle  Administration: Unknown dose (10/21/2024)        Review of Systems otherwise neg        Objective   /78 (BP Location: Right arm, Patient Position: Sitting, Cuff Size: Adult)   Pulse 100   Temp 97.9 °F (36.6 °C) (Temporal)   Resp 16   Ht 5' 6\" (1.676 m)   Wt 84.4 kg (186 lb)   SpO2 97%   BMI 30.02 kg/m²     ECOG   2  Physical Exam    GEN: Alert, awake oriented x3, in no acute distress  HEENT- No pallor, icterus, cyanosis, erythema in posterior pharynx  LAD - no palpable cervical, clavicle, axillary, inguinal LAD  Heart- normal S1 S2, regular rate and rhythm, No murmur, rubs.   Lungs- clear breathing sound bilateral.   Abdomen- soft, Non tender, bowel sounds present  Extremities- No cyanosis, clubbing, edema  Neuro- No focal neurological deficit      Labs: I have reviewed the following labs:  No results found for: \"CBCPLATDIFF\"  Lab Results   Component Value Date/Time    Potassium 3.8 12/04/2024 11:45 AM    " Chloride 104 12/04/2024 11:45 AM    CO2 25 12/04/2024 11:45 AM    BUN 20 12/04/2024 11:45 AM    Creatinine 1.02 12/04/2024 11:45 AM    Glucose, Fasting 102 (H) 10/30/2024 04:34 AM    Calcium 9.0 12/04/2024 11:45 AM    Corrected Calcium 9.6 12/04/2024 11:45 AM    AST 28 12/04/2024 11:45 AM    ALT 11 12/04/2024 11:45 AM    Alkaline Phosphatase 102 12/04/2024 11:45 AM    eGFR 56 12/04/2024 11:45 AM

## 2024-12-09 ENCOUNTER — HOSPITAL ENCOUNTER (OUTPATIENT)
Dept: INFUSION CENTER | Facility: CLINIC | Age: 69
Discharge: HOME | End: 2024-12-09
Payer: MEDICARE

## 2024-12-09 VITALS
HEART RATE: 94 BPM | SYSTOLIC BLOOD PRESSURE: 109 MMHG | DIASTOLIC BLOOD PRESSURE: 73 MMHG | RESPIRATION RATE: 18 BRPM | TEMPERATURE: 96.9 F

## 2024-12-09 DIAGNOSIS — Z17.0 MALIGNANT NEOPLASM OF UPPER-OUTER QUADRANT OF RIGHT BREAST IN FEMALE, ESTROGEN RECEPTOR POSITIVE (HCC): Primary | ICD-10-CM

## 2024-12-09 DIAGNOSIS — C50.411 MALIGNANT NEOPLASM OF UPPER-OUTER QUADRANT OF RIGHT BREAST IN FEMALE, ESTROGEN RECEPTOR POSITIVE (HCC): Primary | ICD-10-CM

## 2024-12-09 LAB
ALBUMIN SERPL BCG-MCNC: 3.3 G/DL (ref 3.5–5)
ALP SERPL-CCNC: 88 U/L (ref 34–104)
ALT SERPL W P-5'-P-CCNC: 11 U/L (ref 7–52)
ANION GAP SERPL CALCULATED.3IONS-SCNC: 6 MMOL/L (ref 4–13)
AST SERPL W P-5'-P-CCNC: 28 U/L (ref 13–39)
BASOPHILS # BLD AUTO: 0.02 THOUSANDS/ÂΜL (ref 0–0.1)
BASOPHILS NFR BLD AUTO: 0 % (ref 0–1)
BILIRUB SERPL-MCNC: 0.26 MG/DL (ref 0.2–1)
BUN SERPL-MCNC: 27 MG/DL (ref 5–25)
CALCIUM ALBUM COR SERPL-MCNC: 9.5 MG/DL (ref 8.3–10.1)
CALCIUM SERPL-MCNC: 8.9 MG/DL (ref 8.4–10.2)
CHLORIDE SERPL-SCNC: 107 MMOL/L (ref 96–108)
CO2 SERPL-SCNC: 27 MMOL/L (ref 21–32)
CREAT SERPL-MCNC: 1.3 MG/DL (ref 0.6–1.3)
EOSINOPHIL # BLD AUTO: 0.1 THOUSAND/ÂΜL (ref 0–0.61)
EOSINOPHIL NFR BLD AUTO: 2 % (ref 0–6)
ERYTHROCYTE [DISTWIDTH] IN BLOOD BY AUTOMATED COUNT: 18.6 % (ref 11.6–15.1)
GFR SERPL CREATININE-BSD FRML MDRD: 42 ML/MIN/1.73SQ M
GLUCOSE SERPL-MCNC: 79 MG/DL (ref 65–140)
HCT VFR BLD AUTO: 25.1 % (ref 34.8–46.1)
HGB BLD-MCNC: 8.4 G/DL (ref 11.5–15.4)
IMM GRANULOCYTES # BLD AUTO: 0.03 THOUSAND/UL (ref 0–0.2)
IMM GRANULOCYTES NFR BLD AUTO: 1 % (ref 0–2)
LYMPHOCYTES # BLD AUTO: 0.72 THOUSANDS/ÂΜL (ref 0.6–4.47)
LYMPHOCYTES NFR BLD AUTO: 12 % (ref 14–44)
MAGNESIUM SERPL-MCNC: 1.8 MG/DL (ref 1.9–2.7)
MCH RBC QN AUTO: 30.3 PG (ref 26.8–34.3)
MCHC RBC AUTO-ENTMCNC: 33.5 G/DL (ref 31.4–37.4)
MCV RBC AUTO: 91 FL (ref 82–98)
MONOCYTES # BLD AUTO: 0.71 THOUSAND/ÂΜL (ref 0.17–1.22)
MONOCYTES NFR BLD AUTO: 12 % (ref 4–12)
NEUTROPHILS # BLD AUTO: 4.22 THOUSANDS/ÂΜL (ref 1.85–7.62)
NEUTS SEG NFR BLD AUTO: 73 % (ref 43–75)
NRBC BLD AUTO-RTO: 0 /100 WBCS
PLATELET # BLD AUTO: 304 THOUSANDS/UL (ref 149–390)
PMV BLD AUTO: 10.1 FL (ref 8.9–12.7)
POTASSIUM SERPL-SCNC: 3.7 MMOL/L (ref 3.5–5.3)
PROT SERPL-MCNC: 7.4 G/DL (ref 6.4–8.4)
RBC # BLD AUTO: 2.77 MILLION/UL (ref 3.81–5.12)
SODIUM SERPL-SCNC: 140 MMOL/L (ref 135–147)
WBC # BLD AUTO: 5.8 THOUSAND/UL (ref 4.31–10.16)

## 2024-12-09 PROCEDURE — 80053 COMPREHEN METABOLIC PANEL: CPT | Performed by: INTERNAL MEDICINE

## 2024-12-09 PROCEDURE — 85025 COMPLETE CBC W/AUTO DIFF WBC: CPT | Performed by: INTERNAL MEDICINE

## 2024-12-09 PROCEDURE — 83735 ASSAY OF MAGNESIUM: CPT | Performed by: INTERNAL MEDICINE

## 2024-12-09 PROCEDURE — 96360 HYDRATION IV INFUSION INIT: CPT

## 2024-12-09 RX ADMIN — SODIUM CHLORIDE 1000 ML: 9 INJECTION, SOLUTION INTRAVENOUS at 14:19

## 2024-12-09 NOTE — PROGRESS NOTES
Pt presents for NSS x 1L. No new meds or concerns. Pt tolerated treatment without adverse reaction. Future appointments discussed, confirmed with patient for 12/11/2024 1400. AVS declined.

## 2024-12-11 ENCOUNTER — HOSPITAL ENCOUNTER (OUTPATIENT)
Dept: INFUSION CENTER | Facility: CLINIC | Age: 69
Discharge: HOME | End: 2024-12-11
Payer: MEDICARE

## 2024-12-11 ENCOUNTER — TELEPHONE (OUTPATIENT)
Dept: HEMATOLOGY ONCOLOGY | Facility: CLINIC | Age: 69
End: 2024-12-11

## 2024-12-11 VITALS
DIASTOLIC BLOOD PRESSURE: 75 MMHG | RESPIRATION RATE: 18 BRPM | SYSTOLIC BLOOD PRESSURE: 124 MMHG | HEART RATE: 101 BPM | TEMPERATURE: 97.8 F

## 2024-12-11 DIAGNOSIS — Z17.0 MALIGNANT NEOPLASM OF UPPER-OUTER QUADRANT OF RIGHT BREAST IN FEMALE, ESTROGEN RECEPTOR POSITIVE (HCC): Primary | ICD-10-CM

## 2024-12-11 DIAGNOSIS — C50.411 MALIGNANT NEOPLASM OF UPPER-OUTER QUADRANT OF RIGHT BREAST IN FEMALE, ESTROGEN RECEPTOR POSITIVE (HCC): Primary | ICD-10-CM

## 2024-12-11 LAB
ALBUMIN SERPL BCG-MCNC: 3.4 G/DL (ref 3.5–5)
ALP SERPL-CCNC: 104 U/L (ref 34–104)
ALT SERPL W P-5'-P-CCNC: 11 U/L (ref 7–52)
ANION GAP SERPL CALCULATED.3IONS-SCNC: 7 MMOL/L (ref 4–13)
AST SERPL W P-5'-P-CCNC: 32 U/L (ref 13–39)
BASOPHILS # BLD AUTO: 0.03 THOUSANDS/ÂΜL (ref 0–0.1)
BASOPHILS NFR BLD AUTO: 1 % (ref 0–1)
BILIRUB SERPL-MCNC: 0.48 MG/DL (ref 0.2–1)
BUN SERPL-MCNC: 20 MG/DL (ref 5–25)
CALCIUM ALBUM COR SERPL-MCNC: 10.1 MG/DL (ref 8.3–10.1)
CALCIUM SERPL-MCNC: 9.6 MG/DL (ref 8.4–10.2)
CHLORIDE SERPL-SCNC: 103 MMOL/L (ref 96–108)
CO2 SERPL-SCNC: 28 MMOL/L (ref 21–32)
CREAT SERPL-MCNC: 1.11 MG/DL (ref 0.6–1.3)
EOSINOPHIL # BLD AUTO: 0.11 THOUSAND/ÂΜL (ref 0–0.61)
EOSINOPHIL NFR BLD AUTO: 2 % (ref 0–6)
ERYTHROCYTE [DISTWIDTH] IN BLOOD BY AUTOMATED COUNT: 18.2 % (ref 11.6–15.1)
GFR SERPL CREATININE-BSD FRML MDRD: 51 ML/MIN/1.73SQ M
GLUCOSE SERPL-MCNC: 110 MG/DL (ref 65–140)
HCT VFR BLD AUTO: 26.3 % (ref 34.8–46.1)
HGB BLD-MCNC: 8.8 G/DL (ref 11.5–15.4)
IMM GRANULOCYTES # BLD AUTO: 0.01 THOUSAND/UL (ref 0–0.2)
IMM GRANULOCYTES NFR BLD AUTO: 0 % (ref 0–2)
LYMPHOCYTES # BLD AUTO: 0.93 THOUSANDS/ÂΜL (ref 0.6–4.47)
LYMPHOCYTES NFR BLD AUTO: 19 % (ref 14–44)
MAGNESIUM SERPL-MCNC: 1.6 MG/DL (ref 1.9–2.7)
MCH RBC QN AUTO: 29.9 PG (ref 26.8–34.3)
MCHC RBC AUTO-ENTMCNC: 33.5 G/DL (ref 31.4–37.4)
MCV RBC AUTO: 90 FL (ref 82–98)
MONOCYTES # BLD AUTO: 0.53 THOUSAND/ÂΜL (ref 0.17–1.22)
MONOCYTES NFR BLD AUTO: 11 % (ref 4–12)
NEUTROPHILS # BLD AUTO: 3.41 THOUSANDS/ÂΜL (ref 1.85–7.62)
NEUTS SEG NFR BLD AUTO: 67 % (ref 43–75)
NRBC BLD AUTO-RTO: 0 /100 WBCS
PLATELET # BLD AUTO: 297 THOUSANDS/UL (ref 149–390)
PMV BLD AUTO: 10.6 FL (ref 8.9–12.7)
POTASSIUM SERPL-SCNC: 3.7 MMOL/L (ref 3.5–5.3)
PROT SERPL-MCNC: 7.4 G/DL (ref 6.4–8.4)
RBC # BLD AUTO: 2.94 MILLION/UL (ref 3.81–5.12)
SODIUM SERPL-SCNC: 138 MMOL/L (ref 135–147)
WBC # BLD AUTO: 5.02 THOUSAND/UL (ref 4.31–10.16)

## 2024-12-11 PROCEDURE — 83735 ASSAY OF MAGNESIUM: CPT | Performed by: INTERNAL MEDICINE

## 2024-12-11 PROCEDURE — 80053 COMPREHEN METABOLIC PANEL: CPT | Performed by: INTERNAL MEDICINE

## 2024-12-11 PROCEDURE — 96360 HYDRATION IV INFUSION INIT: CPT

## 2024-12-11 PROCEDURE — 85025 COMPLETE CBC W/AUTO DIFF WBC: CPT | Performed by: INTERNAL MEDICINE

## 2024-12-11 RX ADMIN — SODIUM CHLORIDE 1000 ML: 0.9 INJECTION, SOLUTION INTRAVENOUS at 14:38

## 2024-12-11 NOTE — TELEPHONE ENCOUNTER
A call was placed to Maira, reached her voice mail, a message was left. An appointment has been scheduled with Dr Medina on 12/24/24 840am and LILLY requested. Please call Dr Whitfield's office to verify if their office scheduled transportation for the January appointment.

## 2024-12-11 NOTE — PROGRESS NOTES
Pt offers no new complaints today, tolerated hydration/central labs per orders without complications, confirmed appt back on 12-13-24 12:30, AVS provided

## 2024-12-12 RX ORDER — MAGNESIUM SULFATE HEPTAHYDRATE 40 MG/ML
2 INJECTION, SOLUTION INTRAVENOUS ONCE
Status: CANCELLED
Start: 2024-12-13 | End: 2024-12-13

## 2024-12-12 RX ORDER — MAGNESIUM SULFATE HEPTAHYDRATE 40 MG/ML
2 INJECTION, SOLUTION INTRAVENOUS ONCE
Status: CANCELLED
Start: 2024-12-13

## 2024-12-12 RX ORDER — MAGNESIUM SULFATE HEPTAHYDRATE 40 MG/ML
2 INJECTION, SOLUTION INTRAVENOUS ONCE
Status: CANCELLED
Start: 2024-12-12

## 2024-12-12 RX ORDER — MAGNESIUM SULFATE HEPTAHYDRATE 40 MG/ML
2 INJECTION, SOLUTION INTRAVENOUS ONCE
Status: CANCELLED
Start: 2024-12-12 | End: 2024-12-12

## 2024-12-13 ENCOUNTER — HOSPITAL ENCOUNTER (OUTPATIENT)
Dept: INFUSION CENTER | Facility: CLINIC | Age: 69
End: 2024-12-13
Payer: MEDICARE

## 2024-12-13 VITALS
DIASTOLIC BLOOD PRESSURE: 77 MMHG | SYSTOLIC BLOOD PRESSURE: 120 MMHG | HEART RATE: 94 BPM | TEMPERATURE: 97.3 F | RESPIRATION RATE: 18 BRPM

## 2024-12-13 DIAGNOSIS — C50.411 MALIGNANT NEOPLASM OF UPPER-OUTER QUADRANT OF RIGHT BREAST IN FEMALE, ESTROGEN RECEPTOR POSITIVE (HCC): Primary | ICD-10-CM

## 2024-12-13 DIAGNOSIS — Z17.0 MALIGNANT NEOPLASM OF UPPER-OUTER QUADRANT OF RIGHT BREAST IN FEMALE, ESTROGEN RECEPTOR POSITIVE (HCC): Primary | ICD-10-CM

## 2024-12-13 LAB
ALBUMIN SERPL BCG-MCNC: 3.5 G/DL (ref 3.5–5)
ALP SERPL-CCNC: 96 U/L (ref 34–104)
ALT SERPL W P-5'-P-CCNC: 12 U/L (ref 7–52)
ANION GAP SERPL CALCULATED.3IONS-SCNC: 6 MMOL/L (ref 4–13)
AST SERPL W P-5'-P-CCNC: 33 U/L (ref 13–39)
BASOPHILS # BLD AUTO: 0.02 THOUSANDS/ÂΜL (ref 0–0.1)
BASOPHILS NFR BLD AUTO: 0 % (ref 0–1)
BILIRUB SERPL-MCNC: 0.4 MG/DL (ref 0.2–1)
BUN SERPL-MCNC: 19 MG/DL (ref 5–25)
CALCIUM SERPL-MCNC: 9.6 MG/DL (ref 8.4–10.2)
CHLORIDE SERPL-SCNC: 102 MMOL/L (ref 96–108)
CO2 SERPL-SCNC: 30 MMOL/L (ref 21–32)
CREAT SERPL-MCNC: 1.24 MG/DL (ref 0.6–1.3)
EOSINOPHIL # BLD AUTO: 0.13 THOUSAND/ÂΜL (ref 0–0.61)
EOSINOPHIL NFR BLD AUTO: 2 % (ref 0–6)
ERYTHROCYTE [DISTWIDTH] IN BLOOD BY AUTOMATED COUNT: 18.1 % (ref 11.6–15.1)
GFR SERPL CREATININE-BSD FRML MDRD: 44 ML/MIN/1.73SQ M
GLUCOSE SERPL-MCNC: 109 MG/DL (ref 65–140)
HCT VFR BLD AUTO: 25.8 % (ref 34.8–46.1)
HGB BLD-MCNC: 8.6 G/DL (ref 11.5–15.4)
IMM GRANULOCYTES # BLD AUTO: 0.02 THOUSAND/UL (ref 0–0.2)
IMM GRANULOCYTES NFR BLD AUTO: 0 % (ref 0–2)
LYMPHOCYTES # BLD AUTO: 0.77 THOUSANDS/ÂΜL (ref 0.6–4.47)
LYMPHOCYTES NFR BLD AUTO: 13 % (ref 14–44)
MAGNESIUM SERPL-MCNC: 1.8 MG/DL (ref 1.9–2.7)
MCH RBC QN AUTO: 30 PG (ref 26.8–34.3)
MCHC RBC AUTO-ENTMCNC: 33.3 G/DL (ref 31.4–37.4)
MCV RBC AUTO: 90 FL (ref 82–98)
MONOCYTES # BLD AUTO: 0.67 THOUSAND/ÂΜL (ref 0.17–1.22)
MONOCYTES NFR BLD AUTO: 11 % (ref 4–12)
NEUTROPHILS # BLD AUTO: 4.48 THOUSANDS/ÂΜL (ref 1.85–7.62)
NEUTS SEG NFR BLD AUTO: 74 % (ref 43–75)
NRBC BLD AUTO-RTO: 0 /100 WBCS
PLATELET # BLD AUTO: 289 THOUSANDS/UL (ref 149–390)
PMV BLD AUTO: 10.2 FL (ref 8.9–12.7)
POTASSIUM SERPL-SCNC: 3.7 MMOL/L (ref 3.5–5.3)
PROT SERPL-MCNC: 7.6 G/DL (ref 6.4–8.4)
RBC # BLD AUTO: 2.87 MILLION/UL (ref 3.81–5.12)
SODIUM SERPL-SCNC: 138 MMOL/L (ref 135–147)
WBC # BLD AUTO: 6.09 THOUSAND/UL (ref 4.31–10.16)

## 2024-12-13 PROCEDURE — 80053 COMPREHEN METABOLIC PANEL: CPT | Performed by: INTERNAL MEDICINE

## 2024-12-13 PROCEDURE — 85025 COMPLETE CBC W/AUTO DIFF WBC: CPT | Performed by: INTERNAL MEDICINE

## 2024-12-13 PROCEDURE — 83735 ASSAY OF MAGNESIUM: CPT | Performed by: INTERNAL MEDICINE

## 2024-12-13 PROCEDURE — 96365 THER/PROPH/DIAG IV INF INIT: CPT

## 2024-12-13 RX ORDER — MAGNESIUM SULFATE HEPTAHYDRATE 40 MG/ML
2 INJECTION, SOLUTION INTRAVENOUS ONCE
Status: COMPLETED | OUTPATIENT
Start: 2024-12-13 | End: 2024-12-13

## 2024-12-13 RX ORDER — MAGNESIUM SULFATE HEPTAHYDRATE 40 MG/ML
2 INJECTION, SOLUTION INTRAVENOUS ONCE
Status: CANCELLED
Start: 2024-12-16 | End: 2024-12-16

## 2024-12-13 RX ADMIN — SODIUM CHLORIDE 1000 ML: 0.9 INJECTION, SOLUTION INTRAVENOUS at 14:26

## 2024-12-13 RX ADMIN — MAGNESIUM SULFATE HEPTAHYDRATE 2 G: 40 INJECTION, SOLUTION INTRAVENOUS at 14:28

## 2024-12-13 NOTE — PROGRESS NOTES
Patient arrived to unit without complaint. Patient tolerated IV hydration and Magnesium IV without incident. AVS declined and patient aware of next appointment on 12/16/24 at 12:30. Patient left in stable condition.

## 2024-12-16 ENCOUNTER — HOSPITAL ENCOUNTER (OUTPATIENT)
Dept: INFUSION CENTER | Facility: CLINIC | Age: 69
Discharge: HOME | End: 2024-12-16
Payer: MEDICARE

## 2024-12-16 DIAGNOSIS — Z17.0 MALIGNANT NEOPLASM OF UPPER-OUTER QUADRANT OF RIGHT BREAST IN FEMALE, ESTROGEN RECEPTOR POSITIVE (HCC): Primary | ICD-10-CM

## 2024-12-16 DIAGNOSIS — C50.411 MALIGNANT NEOPLASM OF UPPER-OUTER QUADRANT OF RIGHT BREAST IN FEMALE, ESTROGEN RECEPTOR POSITIVE (HCC): Primary | ICD-10-CM

## 2024-12-16 DIAGNOSIS — Z90.89 STATUS POST TONSILLECTOMY: ICD-10-CM

## 2024-12-16 LAB
ALBUMIN SERPL BCG-MCNC: 3.7 G/DL (ref 3.5–5)
ALP SERPL-CCNC: 101 U/L (ref 34–104)
ALT SERPL W P-5'-P-CCNC: 11 U/L (ref 7–52)
ANION GAP SERPL CALCULATED.3IONS-SCNC: 7 MMOL/L (ref 4–13)
AST SERPL W P-5'-P-CCNC: 34 U/L (ref 13–39)
BASOPHILS # BLD AUTO: 0.03 THOUSANDS/ÂΜL (ref 0–0.1)
BASOPHILS NFR BLD AUTO: 0 % (ref 0–1)
BILIRUB SERPL-MCNC: 0.49 MG/DL (ref 0.2–1)
BUN SERPL-MCNC: 19 MG/DL (ref 5–25)
CALCIUM SERPL-MCNC: 9.7 MG/DL (ref 8.4–10.2)
CHLORIDE SERPL-SCNC: 104 MMOL/L (ref 96–108)
CO2 SERPL-SCNC: 29 MMOL/L (ref 21–32)
CREAT SERPL-MCNC: 1.22 MG/DL (ref 0.6–1.3)
EOSINOPHIL # BLD AUTO: 0.12 THOUSAND/ÂΜL (ref 0–0.61)
EOSINOPHIL NFR BLD AUTO: 2 % (ref 0–6)
ERYTHROCYTE [DISTWIDTH] IN BLOOD BY AUTOMATED COUNT: 17.8 % (ref 11.6–15.1)
GFR SERPL CREATININE-BSD FRML MDRD: 45 ML/MIN/1.73SQ M
GLUCOSE SERPL-MCNC: 109 MG/DL (ref 65–140)
HCT VFR BLD AUTO: 26.7 % (ref 34.8–46.1)
HGB BLD-MCNC: 8.9 G/DL (ref 11.5–15.4)
IMM GRANULOCYTES # BLD AUTO: 0.02 THOUSAND/UL (ref 0–0.2)
IMM GRANULOCYTES NFR BLD AUTO: 0 % (ref 0–2)
LYMPHOCYTES # BLD AUTO: 0.75 THOUSANDS/ÂΜL (ref 0.6–4.47)
LYMPHOCYTES NFR BLD AUTO: 10 % (ref 14–44)
MAGNESIUM SERPL-MCNC: 1.7 MG/DL (ref 1.9–2.7)
MCH RBC QN AUTO: 30.8 PG (ref 26.8–34.3)
MCHC RBC AUTO-ENTMCNC: 33.3 G/DL (ref 31.4–37.4)
MCV RBC AUTO: 92 FL (ref 82–98)
MONOCYTES # BLD AUTO: 0.71 THOUSAND/ÂΜL (ref 0.17–1.22)
MONOCYTES NFR BLD AUTO: 9 % (ref 4–12)
NEUTROPHILS # BLD AUTO: 6.06 THOUSANDS/ÂΜL (ref 1.85–7.62)
NEUTS SEG NFR BLD AUTO: 79 % (ref 43–75)
NRBC BLD AUTO-RTO: 0 /100 WBCS
PLATELET # BLD AUTO: 300 THOUSANDS/UL (ref 149–390)
PMV BLD AUTO: 10.3 FL (ref 8.9–12.7)
POTASSIUM SERPL-SCNC: 3.4 MMOL/L (ref 3.5–5.3)
PROT SERPL-MCNC: 7.7 G/DL (ref 6.4–8.4)
RBC # BLD AUTO: 2.89 MILLION/UL (ref 3.81–5.12)
SODIUM SERPL-SCNC: 140 MMOL/L (ref 135–147)
WBC # BLD AUTO: 7.69 THOUSAND/UL (ref 4.31–10.16)

## 2024-12-16 PROCEDURE — 85025 COMPLETE CBC W/AUTO DIFF WBC: CPT | Performed by: INTERNAL MEDICINE

## 2024-12-16 PROCEDURE — 83735 ASSAY OF MAGNESIUM: CPT | Performed by: INTERNAL MEDICINE

## 2024-12-16 PROCEDURE — 80053 COMPREHEN METABOLIC PANEL: CPT | Performed by: INTERNAL MEDICINE

## 2024-12-16 PROCEDURE — 96365 THER/PROPH/DIAG IV INF INIT: CPT

## 2024-12-16 RX ORDER — ACETAMINOPHEN 160 MG/5ML
LIQUID ORAL
Qty: 473 ML | Refills: 0 | Status: SHIPPED | OUTPATIENT
Start: 2024-12-16

## 2024-12-16 RX ORDER — OXYCODONE HCL 5 MG/5 ML
5 SOLUTION, ORAL ORAL EVERY 6 HOURS PRN
Qty: 473 ML | Refills: 0 | Status: SHIPPED | OUTPATIENT
Start: 2024-12-16

## 2024-12-16 RX ADMIN — MAGNESIUM SULFATE HEPTAHYDRATE: 500 INJECTION, SOLUTION INTRAMUSCULAR; INTRAVENOUS at 13:08

## 2024-12-16 NOTE — PROGRESS NOTES
Pt tolerated IV hydration without incident.  Pt was provided with AVS and is aware of her appt on Wednesday at 2:00

## 2024-12-18 ENCOUNTER — HOSPITAL ENCOUNTER (OUTPATIENT)
Dept: INFUSION CENTER | Facility: CLINIC | Age: 69
Discharge: HOME | End: 2024-12-18
Payer: MEDICARE

## 2024-12-18 ENCOUNTER — PATIENT OUTREACH (OUTPATIENT)
Dept: CASE MANAGEMENT | Facility: OTHER | Age: 69
End: 2024-12-18

## 2024-12-18 VITALS
HEART RATE: 112 BPM | TEMPERATURE: 97.3 F | DIASTOLIC BLOOD PRESSURE: 82 MMHG | RESPIRATION RATE: 16 BRPM | SYSTOLIC BLOOD PRESSURE: 128 MMHG

## 2024-12-18 DIAGNOSIS — Z17.0 MALIGNANT NEOPLASM OF UPPER-OUTER QUADRANT OF RIGHT BREAST IN FEMALE, ESTROGEN RECEPTOR POSITIVE (HCC): Primary | ICD-10-CM

## 2024-12-18 DIAGNOSIS — C50.411 MALIGNANT NEOPLASM OF UPPER-OUTER QUADRANT OF RIGHT BREAST IN FEMALE, ESTROGEN RECEPTOR POSITIVE (HCC): Primary | ICD-10-CM

## 2024-12-18 LAB
ALBUMIN SERPL BCG-MCNC: 3.5 G/DL (ref 3.5–5)
ALP SERPL-CCNC: 86 U/L (ref 34–104)
ALT SERPL W P-5'-P-CCNC: 10 U/L (ref 7–52)
ANION GAP SERPL CALCULATED.3IONS-SCNC: 8 MMOL/L (ref 4–13)
AST SERPL W P-5'-P-CCNC: 32 U/L (ref 13–39)
BASOPHILS # BLD AUTO: 0.02 THOUSANDS/ÂΜL (ref 0–0.1)
BASOPHILS NFR BLD AUTO: 0 % (ref 0–1)
BILIRUB SERPL-MCNC: 0.43 MG/DL (ref 0.2–1)
BUN SERPL-MCNC: 19 MG/DL (ref 5–25)
CALCIUM SERPL-MCNC: 9.3 MG/DL (ref 8.4–10.2)
CHLORIDE SERPL-SCNC: 106 MMOL/L (ref 96–108)
CO2 SERPL-SCNC: 25 MMOL/L (ref 21–32)
CREAT SERPL-MCNC: 1.18 MG/DL (ref 0.6–1.3)
EOSINOPHIL # BLD AUTO: 0.12 THOUSAND/ÂΜL (ref 0–0.61)
EOSINOPHIL NFR BLD AUTO: 2 % (ref 0–6)
ERYTHROCYTE [DISTWIDTH] IN BLOOD BY AUTOMATED COUNT: 18.2 % (ref 11.6–15.1)
GFR SERPL CREATININE-BSD FRML MDRD: 47 ML/MIN/1.73SQ M
GLUCOSE SERPL-MCNC: 116 MG/DL (ref 65–140)
HCT VFR BLD AUTO: 24.8 % (ref 34.8–46.1)
HGB BLD-MCNC: 8.2 G/DL (ref 11.5–15.4)
IMM GRANULOCYTES # BLD AUTO: 0.01 THOUSAND/UL (ref 0–0.2)
IMM GRANULOCYTES NFR BLD AUTO: 0 % (ref 0–2)
LYMPHOCYTES # BLD AUTO: 0.74 THOUSANDS/ÂΜL (ref 0.6–4.47)
LYMPHOCYTES NFR BLD AUTO: 13 % (ref 14–44)
MAGNESIUM SERPL-MCNC: 2 MG/DL (ref 1.9–2.7)
MCH RBC QN AUTO: 30.1 PG (ref 26.8–34.3)
MCHC RBC AUTO-ENTMCNC: 33.1 G/DL (ref 31.4–37.4)
MCV RBC AUTO: 91 FL (ref 82–98)
MONOCYTES # BLD AUTO: 0.59 THOUSAND/ÂΜL (ref 0.17–1.22)
MONOCYTES NFR BLD AUTO: 10 % (ref 4–12)
NEUTROPHILS # BLD AUTO: 4.21 THOUSANDS/ÂΜL (ref 1.85–7.62)
NEUTS SEG NFR BLD AUTO: 75 % (ref 43–75)
NRBC BLD AUTO-RTO: 0 /100 WBCS
PLATELET # BLD AUTO: 270 THOUSANDS/UL (ref 149–390)
PMV BLD AUTO: 10.3 FL (ref 8.9–12.7)
POTASSIUM SERPL-SCNC: 3.7 MMOL/L (ref 3.5–5.3)
PROT SERPL-MCNC: 7.3 G/DL (ref 6.4–8.4)
RBC # BLD AUTO: 2.72 MILLION/UL (ref 3.81–5.12)
SODIUM SERPL-SCNC: 139 MMOL/L (ref 135–147)
WBC # BLD AUTO: 5.69 THOUSAND/UL (ref 4.31–10.16)

## 2024-12-18 PROCEDURE — 96365 THER/PROPH/DIAG IV INF INIT: CPT

## 2024-12-18 PROCEDURE — 80053 COMPREHEN METABOLIC PANEL: CPT | Performed by: INTERNAL MEDICINE

## 2024-12-18 PROCEDURE — 85025 COMPLETE CBC W/AUTO DIFF WBC: CPT | Performed by: INTERNAL MEDICINE

## 2024-12-18 PROCEDURE — 83735 ASSAY OF MAGNESIUM: CPT | Performed by: INTERNAL MEDICINE

## 2024-12-18 RX ADMIN — MAGNESIUM SULFATE HEPTAHYDRATE: 500 INJECTION, SOLUTION INTRAMUSCULAR; INTRAVENOUS at 14:28

## 2024-12-18 NOTE — PROGRESS NOTES
Pt. Tolerated hydration without adverse event.  Future appointments reviewed. Pt. Will return 12/20/24 at 1300. Pt. Discharged in stable condition to round trip.

## 2024-12-18 NOTE — PLAN OF CARE
Problem: INFECTION - ADULT  Goal: Absence or prevention of progression during hospitalization  Description: INTERVENTIONS:  - Assess and monitor for signs and symptoms of infection  - Monitor lab/diagnostic results  - Monitor all insertion sites, i.e. indwelling lines, tubes, and drains  - Monitor endotracheal if appropriate and nasal secretions for changes in amount and color  - [place] appropriate cooling/warming therapies per order  - Administer medications as ordered  - Instruct and encourage patient and family to use good hand hygiene technique  - Identify and instruct in appropriate isolation precautions for identified infection/condition  Outcome: Progressing  Goal: Absence of fever/infection during neutropenic period  Description: INTERVENTIONS:  - Monitor WBC    Outcome: Progressing     Problem: Knowledge Deficit  Goal: Patient/family/caregiver demonstrates understanding of disease process, treatment plan, medications, and discharge instructions  Description: Complete learning assessment and assess knowledge base.  Interventions:  - Provide teaching at level of understanding  - Provide teaching via preferred learning methods  Outcome: Progressing

## 2024-12-18 NOTE — PROGRESS NOTES
Pt. Denies new symptoms or concerns today. Hydration NSS 1000ml with Magnesium 2 grams ordered today.

## 2024-12-20 ENCOUNTER — OFFICE VISIT (OUTPATIENT)
Dept: RADIATION ONCOLOGY | Facility: CLINIC | Age: 69
End: 2024-12-20
Attending: RADIOLOGY

## 2024-12-20 ENCOUNTER — DOCUMENTATION (OUTPATIENT)
Dept: HEMATOLOGY ONCOLOGY | Facility: CLINIC | Age: 69
End: 2024-12-20

## 2024-12-20 ENCOUNTER — HOSPITAL ENCOUNTER (OUTPATIENT)
Dept: INFUSION CENTER | Facility: CLINIC | Age: 69
End: 2024-12-20
Payer: MEDICARE

## 2024-12-20 VITALS
BODY MASS INDEX: 29.09 KG/M2 | RESPIRATION RATE: 16 BRPM | TEMPERATURE: 97.5 F | HEART RATE: 81 BPM | SYSTOLIC BLOOD PRESSURE: 133 MMHG | WEIGHT: 181 LBS | DIASTOLIC BLOOD PRESSURE: 81 MMHG | OXYGEN SATURATION: 95 % | HEIGHT: 66 IN

## 2024-12-20 VITALS
DIASTOLIC BLOOD PRESSURE: 75 MMHG | RESPIRATION RATE: 18 BRPM | SYSTOLIC BLOOD PRESSURE: 115 MMHG | TEMPERATURE: 97.8 F | HEART RATE: 83 BPM

## 2024-12-20 DIAGNOSIS — C78.7 METASTATIC COLON CANCER TO LIVER (HCC): ICD-10-CM

## 2024-12-20 DIAGNOSIS — C50.411 MALIGNANT NEOPLASM OF UPPER-OUTER QUADRANT OF RIGHT BREAST IN FEMALE, ESTROGEN RECEPTOR POSITIVE (HCC): Primary | ICD-10-CM

## 2024-12-20 DIAGNOSIS — Z17.0 MALIGNANT NEOPLASM OF UPPER-OUTER QUADRANT OF RIGHT BREAST IN FEMALE, ESTROGEN RECEPTOR POSITIVE (HCC): Primary | ICD-10-CM

## 2024-12-20 DIAGNOSIS — C09.9 RIGHT TONSILLAR SQUAMOUS CELL CARCINOMA (HCC): ICD-10-CM

## 2024-12-20 DIAGNOSIS — C09.9 RIGHT TONSILLAR SQUAMOUS CELL CARCINOMA (HCC): Primary | ICD-10-CM

## 2024-12-20 DIAGNOSIS — C18.9 METASTATIC COLON CANCER TO LIVER (HCC): ICD-10-CM

## 2024-12-20 LAB
ALBUMIN SERPL BCG-MCNC: 3.4 G/DL (ref 3.5–5)
ALP SERPL-CCNC: 79 U/L (ref 34–104)
ALT SERPL W P-5'-P-CCNC: 10 U/L (ref 7–52)
ANION GAP SERPL CALCULATED.3IONS-SCNC: 6 MMOL/L (ref 4–13)
AST SERPL W P-5'-P-CCNC: 27 U/L (ref 13–39)
BASOPHILS # BLD AUTO: 0.01 THOUSANDS/ΜL (ref 0–0.1)
BASOPHILS NFR BLD AUTO: 0 % (ref 0–1)
BILIRUB SERPL-MCNC: 0.4 MG/DL (ref 0.2–1)
BUN SERPL-MCNC: 20 MG/DL (ref 5–25)
CALCIUM ALBUM COR SERPL-MCNC: 10 MG/DL (ref 8.3–10.1)
CALCIUM SERPL-MCNC: 9.5 MG/DL (ref 8.4–10.2)
CHLORIDE SERPL-SCNC: 105 MMOL/L (ref 96–108)
CO2 SERPL-SCNC: 29 MMOL/L (ref 21–32)
CREAT SERPL-MCNC: 1.16 MG/DL (ref 0.6–1.3)
EOSINOPHIL # BLD AUTO: 0.14 THOUSAND/ΜL (ref 0–0.61)
EOSINOPHIL NFR BLD AUTO: 3 % (ref 0–6)
ERYTHROCYTE [DISTWIDTH] IN BLOOD BY AUTOMATED COUNT: 18.3 % (ref 11.6–15.1)
GFR SERPL CREATININE-BSD FRML MDRD: 48 ML/MIN/1.73SQ M
GLUCOSE SERPL-MCNC: 116 MG/DL (ref 65–140)
HCT VFR BLD AUTO: 24.5 % (ref 34.8–46.1)
HGB BLD-MCNC: 7.9 G/DL (ref 11.5–15.4)
IMM GRANULOCYTES # BLD AUTO: 0.03 THOUSAND/UL (ref 0–0.2)
IMM GRANULOCYTES NFR BLD AUTO: 1 % (ref 0–2)
LYMPHOCYTES # BLD AUTO: 0.7 THOUSANDS/ΜL (ref 0.6–4.47)
LYMPHOCYTES NFR BLD AUTO: 13 % (ref 14–44)
MAGNESIUM SERPL-MCNC: 1.9 MG/DL (ref 1.9–2.7)
MCH RBC QN AUTO: 30.2 PG (ref 26.8–34.3)
MCHC RBC AUTO-ENTMCNC: 32.2 G/DL (ref 31.4–37.4)
MCV RBC AUTO: 94 FL (ref 82–98)
MONOCYTES # BLD AUTO: 0.54 THOUSAND/ΜL (ref 0.17–1.22)
MONOCYTES NFR BLD AUTO: 10 % (ref 4–12)
NEUTROPHILS # BLD AUTO: 4.01 THOUSANDS/ΜL (ref 1.85–7.62)
NEUTS SEG NFR BLD AUTO: 73 % (ref 43–75)
NRBC BLD AUTO-RTO: 0 /100 WBCS
PLATELET # BLD AUTO: 244 THOUSANDS/UL (ref 149–390)
PMV BLD AUTO: 10.8 FL (ref 8.9–12.7)
POTASSIUM SERPL-SCNC: 3.9 MMOL/L (ref 3.5–5.3)
PROT SERPL-MCNC: 7.2 G/DL (ref 6.4–8.4)
RBC # BLD AUTO: 2.62 MILLION/UL (ref 3.81–5.12)
SODIUM SERPL-SCNC: 140 MMOL/L (ref 135–147)
WBC # BLD AUTO: 5.43 THOUSAND/UL (ref 4.31–10.16)

## 2024-12-20 PROCEDURE — 99024 POSTOP FOLLOW-UP VISIT: CPT | Performed by: RADIOLOGY

## 2024-12-20 PROCEDURE — 80053 COMPREHEN METABOLIC PANEL: CPT | Performed by: INTERNAL MEDICINE

## 2024-12-20 PROCEDURE — 83735 ASSAY OF MAGNESIUM: CPT | Performed by: INTERNAL MEDICINE

## 2024-12-20 PROCEDURE — 96360 HYDRATION IV INFUSION INIT: CPT

## 2024-12-20 PROCEDURE — 85025 COMPLETE CBC W/AUTO DIFF WBC: CPT | Performed by: INTERNAL MEDICINE

## 2024-12-20 RX ORDER — DIPHENHYDRAMINE HYDROCHLORIDE AND LIDOCAINE HYDROCHLORIDE AND ALUMINUM HYDROXIDE AND MAGNESIUM HYDRO
10 KIT EVERY 4 HOURS PRN
Qty: 480 ML | Refills: 2 | Status: SHIPPED | OUTPATIENT
Start: 2024-12-20

## 2024-12-20 RX ADMIN — SODIUM CHLORIDE 1000 ML: 0.9 INJECTION, SOLUTION INTRAVENOUS at 13:22

## 2024-12-20 NOTE — PROGRESS NOTES
Maira Moura 1955 is a 69 y.o. female With history of malignant neoplasm of right breast and metastatic colon cancer to liver. 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 Today she presents for EOT visit.     12/3/24 CT CAP  IMPRESSION:   1.  Mixed interval changes in the known hepatic metastases. While the segment 8 and segment 3 lesions appear slightly smaller on the current study the segment 6 lesion has clearly enlarged, with trans-capsular extension. There is a capsular cystic and   solid component/implant which is new since the prior study.  2.  New nonspecific enlargement along the left cervical region. While possibly due to volume averaging, a primary cervical mass or drop metastasis cannot be excluded. Further evaluation by gadolinium-enhanced MRI of the female pelvis is recommended.    12/6/24    Discussed CT findings that may show some progression. Will order PET CT to see what is active and what is from liver directed therapies. Will discuss at Lower GI tumor board. Discussed restarting systemic therapy and biopsies that are needed prior,will address with tumor board. She will take a break and and heal from her head and neck treatment. Follow up in a month. Hydration will continue 3 x and electrolytes as needed.     12/19/24 ENT  Re discuss at tumor board after PET ct. She is still in the process of healing.       12/23/24 PET CT       Follow up visit     Oncology History   Malignant neoplasm of right female breast (HCC)   11/23/2018 Initial Diagnosis    Malignant neoplasm of right female breast (HCC)     11/23/2018 Biopsy    Rt Breast US BX 1100 8cmfn, 4 passes 12g [place]:  - Invasive breast carcinoma of no special type (ductal NST/invasive ductal carcinoma).  - grade 2  - %  - SC 95%  - HER2 negative     12/20/2018 Surgery    Right partial mastectomy and SLN biopsy:  Infiltrating ductal carcinoma, Grade 2/3, felt to be between 2.0 cm  "and 2.5 cm in greatest dimension  - No invasive tumor is seen at inked margins, but there is a focus of DCIS seen within approximately 1mm of the inked medial margin  - no LVI  - no LCIS  - 0/2 LN's  at least Stage IIA - pT2, pN0, cM0, G2.    - Dr Coronado     12/20/2018 -  Cancer Staged    Stage IIA - pT2, pN0, cM0, G2.       1/4/2019 Genomic Testing    Oncotype DX score: 13     2/1/2019 -  Hormone Therapy    anastrozole 1 mg daily as adjuvant endocrine therapy    - Dr Daley       2/14/2019 - 4/3/2019 Radiation    Course: C1    Plan ID Energy Fractions Dose per Fraction (cGy) Dose Correction (cGy) Total Dose Delivered (cGy) Elapsed Days   R Breast 10X/6X 25 / 25 200 0 5,000 40   R BRST BOOST 16E 5 / 5 200 0 1,000 7      Treatment dates:  C1: 2/14/2019 - 4/3/2019       Metastatic colon cancer to liver (HCC)   7/6/2023 Genetic Testing    CARIS Results:   KRAS Pathogenic Variant Exon 2  p.G12D : lack of benefit of cetuximab, panitumumab Level 2   No other actionable mutation; MSI stable; TMB low   MiFOLOXAi Results: \"Decreased Benefit of FOLFOX + Bevacizumab in first line metastatic CRC.  This patient may achieve improved results by receiving an alternative to FOLFOX, such as FOLFIRI, as their initial regimen. As an adjustment to frontline FOLFOXIRI following toxicity: This patient may achieve improved results by removing the oxaliplatin portion of their regimen\".         7/11/2023 Initial Diagnosis    Metastatic colon cancer to liver (HCC)     7/13/2023 -  Cancer Staged    Staging form: Colon and Rectum, AJCC 8th Edition  - Clinical stage from 7/13/2023: cT3, cN0, pM1 - Signed by Harika Medina DO on 9/28/2023  Total positive nodes: 0       7/31/2023 - 8/1/2024 Chemotherapy    cyanocobalamin, 1,000 mcg, Intramuscular, Every 30 days, 7 of 9 cycles  Administration: 1,000 mcg (5/9/2024), 1,000 mcg (5/24/2024), 1,000 mcg (6/20/2024), 1,000 mcg (7/3/2024), 1,000 mcg (7/18/2024), 1,000 mcg (8/1/2024)  potassium " chloride, 20 mEq, Intravenous, Once, 2 of 2 cycles  Administration: 20 mEq (11/6/2023), 20 mEq (11/20/2023)  alteplase (CATHFLO), 2 mg, Intracatheter, Every 1 Minute as needed, 21 of 23 cycles  Administration: 2 mg (1/3/2024)  pegfilgrastim (NEULASTA), 6 mg, Subcutaneous, Once, 12 of 12 cycles  Administration: 6 mg (10/25/2023), 6 mg (11/8/2023), 6 mg (11/22/2023), 6 mg (12/6/2023), 6 mg (12/20/2023), 6 mg (1/12/2024), 6 mg (1/25/2024), 6 mg (4/25/2024), 6 mg (5/9/2024), 6 mg (5/24/2024), 6 mg (6/20/2024)  fluorouracil (ADRUCIL), 895 mg, Intravenous, Once, 21 of 23 cycles  Dose modification: 320 mg/m2 (original dose 400 mg/m2, Cycle 11)  Administration: 900 mg (7/31/2023), 900 mg (8/14/2023), 900 mg (8/28/2023), 900 mg (9/11/2023), 900 mg (9/25/2023), 900 mg (10/9/2023), 900 mg (10/23/2023), 895 mg (11/6/2023), 895 mg (11/20/2023), 895 mg (12/4/2023), 700 mg (12/18/2023), 680 mg (1/10/2024), 680 mg (1/23/2024), 625 mg (4/23/2024), 625 mg (5/7/2024), 625 mg (5/22/2024), 625 mg (6/4/2024), 625 mg (6/18/2024), 625 mg (7/1/2024), 625 mg (7/16/2024), 625 mg (7/30/2024)  nivolumab (OPDIVO) IVPB, 240 mg (100 % of original dose 240 mg), Intravenous, Once, 13 of 15 cycles  Dose modification: 240 mg (original dose 240 mg, Cycle 9)  Administration: 240 mg (11/20/2023), 240 mg (12/4/2023), 240 mg (12/18/2023), 240 mg (1/10/2024), 240 mg (1/23/2024), 240 mg (4/23/2024), 240 mg (5/7/2024), 240 mg (5/22/2024), 240 mg (6/4/2024), 240 mg (6/18/2024), 240 mg (7/1/2024), 240 mg (7/16/2024), 240 mg (7/30/2024)  leucovorin calcium IVPB, 896 mg, Intravenous, Once, 21 of 23 cycles  Administration: 900 mg (7/31/2023), 900 mg (8/14/2023), 900 mg (8/28/2023), 900 mg (9/11/2023), 900 mg (9/25/2023), 900 mg (10/9/2023), 900 mg (10/23/2023), 900 mg (11/6/2023), 900 mg (11/20/2023), 900 mg (12/4/2023), 900 mg (12/18/2023), 850 mg (1/10/2024), 850 mg (1/23/2024), 800 mg (4/23/2024), 800 mg (5/7/2024), 800 mg (5/22/2024), 800 mg (6/4/2024), 800 mg  (6/18/2024), 800 mg (7/1/2024), 800 mg (7/16/2024), 800 mg (7/30/2024)  oxaliplatin (ELOXATIN) chemo infusion, 85 mg/m2 = 190.4 mg, Intravenous, Once, 12 of 12 cycles  Dose modification: 68 mg/m2 (original dose 85 mg/m2, Cycle 11, Reason: Other (Must fill in a comment), Comment: increased fatigue)  Administration: 190.4 mg (7/31/2023), 190.4 mg (8/14/2023), 200 mg (8/28/2023), 200 mg (9/11/2023), 200 mg (9/25/2023), 200 mg (10/9/2023), 200 mg (10/23/2023), 200 mg (11/6/2023), 200 mg (11/20/2023), 190.4 mg (12/4/2023), 150 mg (12/18/2023), 150 mg (1/10/2024)  fluorouracil (ADRUCIL) ambulatory infusion Soln, 1,200 mg/m2/day = 5,375 mg, Intravenous, Over 46 hours, 21 of 23 cycles     9/26/2023 -  Cancer Staged    Staging form: Colon and Rectum, AJCC 8th Edition  - Pathologic: pT3, pN0, cM1 - Signed by Vickie Israel MD on 4/3/2024  Total positive nodes: 0       3/12/2024 Surgery    A. Terminal ileum, appendix, right colon (right hemicolectomy):  - Adenocarcinoma (5.2cm)  - Forty-nine (49) lymph nodes negative for carcinoma (0/49)    - Acellular mucin present in one (1) lymph node   - See staging synoptic (ypT3N0)     Right tonsillar squamous cell carcinoma (HCC)   7/27/2023 Initial Diagnosis    Right tonsillar squamous cell carcinoma (HCC)     7/27/2023 -  Cancer Staged    Staging form: Pharynx - Oropharynx, AJCC 8th Edition  - Clinical stage from 7/27/2023: Stage III (cT1, cN3, cM0, p16+) - Signed by Evan Plummer MD on 7/27/2023  Stage prefix: Initial diagnosis       9/16/2024 - 11/13/2024 Radiation      Plan ID Energy Fractions Dose per Fraction (cGy) Dose Correction (cGy) Total Dose Delivered (cGy) Elapsed Days   Head_Neck 6X-FFF 35 / 35 200 0 7,000 58    C2: 9/16/2024 - 11/13/2024 9/17/2024 - 10/21/2024 Chemotherapy    alteplase (CATHFLO), 2 mg, Intracatheter, Every 1 Minute as needed, 6 of 6 cycles  Administration: 2 mg (10/21/2024)  CISplatin (PLATINOL) infusion, 70 mg (original dose 40  mg/m2), Intravenous, Once, 6 of 6 cycles  Dose modification: 70 mg (original dose 40 mg/m2, Cycle 1, Reason: Anticipated Tolerance), 30 mg/m2 (original dose 40 mg/m2, Cycle 4, Reason: Neuropathy, Comment: dose reduction to 30 mg/m2 due to neuropathy)  Administration: 70 mg (9/17/2024), 70 mg (9/23/2024), 70 mg (9/30/2024), 59.1 mg (10/7/2024), 59.1 mg (10/14/2024), 59.1 mg (10/21/2024)  sodium chloride, 500 mL, Intravenous, Once, 6 of 6 cycles  Administration: 500 mL (9/17/2024), 500 mL (9/17/2024), 500 mL (9/23/2024), 500 mL (9/23/2024), 500 mL (9/30/2024), 500 mL (9/30/2024), 500 mL (10/7/2024), 500 mL (10/7/2024), 500 mL (10/14/2024), 500 mL (10/14/2024), 500 mL (10/21/2024)  fosaprepitant (EMEND) IVPB, 150 mg, Intravenous, Once, 6 of 6 cycles  Administration: 150 mg (9/17/2024), 150 mg (9/23/2024), 150 mg (9/30/2024), 150 mg (10/7/2024), 150 mg (10/14/2024), 150 mg (10/21/2024)  IVPB builder, , Intravenous, Once, 1 of 1 cycle  Administration: Unknown dose (10/21/2024)         Review of Systems:  Review of Systems   Constitutional:  Positive for appetite change and fatigue. Negative for activity change.   HENT:  Positive for sore throat and trouble swallowing (Was able to swallow with MMW).    Eyes: Negative.    Respiratory: Negative.     Cardiovascular: Negative.    Gastrointestinal:  Positive for diarrhea. Negative for blood in stool, constipation, nausea and vomiting.   Endocrine: Negative for heat intolerance.   Genitourinary: Negative.         Reports does not urinate as much.    Musculoskeletal: Negative.    Skin: Negative.    Allergic/Immunologic: Negative.    Neurological: Negative.    Hematological: Negative.    Psychiatric/Behavioral: Negative.         Clinical Trial: no    Pregnancy test needed:  no    Teaching completed     Health Maintenance   Topic Date Due    Medicare Annual Wellness Visit (AWV)  Never done    COVID-19 Vaccine (1) Never done    BMI: Followup Plan  Never done    RSV Vaccine Age 60+  Years (1 - Risk 60-74 years 1-dose series) Never done    Zoster Vaccine (1 of 2) 07/04/2018    PT PLAN OF CARE  05/14/2021    Pneumococcal Vaccine: 65+ Years (3 of 3 - PCV) 09/22/2022    Breast Cancer Screening: Mammogram  11/08/2023    Influenza Vaccine (1) 09/01/2024    Fall Risk  10/09/2024    Urinary Incontinence Screening  10/09/2024    Depression Screening  08/20/2025    BMI: Adult  12/19/2025    Hepatitis C Screening  Completed    Osteoporosis Screening  Completed    Meningococcal B Vaccine  Aged Out    RSV Vaccine age 0-20 Months  Aged Out    HIB Vaccine  Aged Out    IPV Vaccine  Aged Out    Hepatitis A Vaccine  Aged Out    Meningococcal ACWY Vaccine  Aged Out    HPV Vaccine  Aged Out    Colorectal Cancer Screening  Discontinued     Patient Active Problem List   Diagnosis    Primary hypertension    Mixed hyperlipidemia    Malignant neoplasm of right female breast (HCC)    Vitamin D deficiency    Prediabetes    Right thyroid nodule    GERD (gastroesophageal reflux disease)    Obesity    Metastatic colon cancer to liver (HCC)    Right tonsillar squamous cell carcinoma (HCC)    Poor appetite    Nutritional anemia    Dehydration    Drug-induced constipation    Chemotherapy induced neutropenia (HCC)    Stage 3a chronic kidney disease (HCC)    Thrombocytopenia (HCC)    Neuropathy    Diastolic dysfunction    Hypokalemia    Leukemoid reaction    GOGO (acute kidney injury) (HCC)    Acute post-operative pain    At risk for constipation    At risk for delirium    Palliative care encounter    Physical deconditioning    History of CVA (cerebrovascular accident)    Chronic nasal congestion    Elevated LFTs    Vitamin B12 deficiency    Neoplastic malignant related fatigue    Sensation, choking    S/P percutaneous endoscopic gastrostomy (PEG) tube placement (HCC)    Pancytopenia due to chemotherapy (HCC)    Cancer related pain    Hypomagnesemia     Past Medical History:   Diagnosis Date    Anemia     Arthritis     Body mass  index (BMI) 40.0-44.9, adult (HCC) 9/22/2023    Breast cancer (HCC) 12/17/2018    Cancer (HCC)     right breast, colon, liver, right tonsil    Cervical lymphadenopathy 3/21/2023    CT neck on 3/30/2023- Large right level 2A lymphadenopathy as described above and suspicious for neoplasm.  Correlation with histopathology is recommended.  Mild asymmetric prominence of the right palatine tonsil with otherwise no definitive nodular enhancing lesions identified along the course of the aerodigestive tract.     5/26/23- FNA of this node was nonrevealing for tissue etiology, but it d    Encounter for screening for HIV 07/07/2022    Follow-up examination 04/04/2023    GERD (gastroesophageal reflux disease)     Hyperlipidemia     Hypertension     Obesity     Stroke (HCC)     TIA     Stroke (HCC)     TIA     Transaminitis 09/22/2021    Vasomotor rhinitis 8/9/2018    Refilled flonase today. Stopped taking since January 2022     Past Surgical History:   Procedure Laterality Date    BREAST BIOPSY Right 11/23/2018    us guided bx cancer    BREAST SURGERY      COLONOSCOPY      ESOPHAGOSCOPY N/A 07/20/2023    Procedure: ESOPHAGOSCOPY;  Surgeon: David Chapa MD;  [facility];  Service: ENT    HEMICOLOECTOMY W/ ANASTOMOSIS Right 3/12/2024    Procedure: RIGHT COLECTOMY WITH ROBOTICS;  Surgeon: Vickie Israel MD;  [facility];  Service: Surgical Oncology    IR BIOPSY LIVER MASS  06/27/2023    IR CRYOABLATION  6/3/2024    IR GASTROSTOMY TUBE PLACEMENT  10/2/2024    IR PORT CHECK  01/03/2024    IR PORT PLACEMENT  07/28/2023    IR PORT STRIPPING  01/09/2024    LAPAROTOMY N/A 3/12/2024    Procedure: LAPAROTOMY EXPLORATORY W/ ROBOTICS;  Surgeon: Vickie Israel MD;  [facility];  Service: Surgical Oncology    VA BIOPSY NASOPHARYNX VISIBLE LESION SIMPLE N/A 10/9/2024    Procedure: NASOPHARYNGOSCOPY with biospy;  Surgeon: Juanito Matthew MD;  Location: [facility];  Service: ENT    [facility] INCL FLUOR  GDNCE DX W/CELL WASHG SPX N/A 2023    Procedure: BRONCHOSCOPY;  Surgeon: David Chapa MD;  [facility];  Service: ENT    IN LARYNGOSCOPY W/BIOPSY MICROSCOPE/TELESCOPE N/A 2023    Procedure: MICRODIRECT LARYNGOSCOPY WITH BIOPSY;  Surgeon: David Chapa MD;  [facility];  Service: ENT    IN LARYNGOSCOPY W/WO TRACHEOSCOPY DX EXCEPT  N/A 10/9/2024    Procedure: DIRECT LARYNGOSCOPY;  Surgeon: Juanito Matthew MD;  [facility];  Service: ENT    IN MASTECTOMY PARTIAL W/AXILLARY LYMPHADENECTOMY Right 2018    Procedure: ULTRASOUND LOCALIZED PARTIAL MASTECTOMY W/SENTINEL NODE BIOPSY POSS AXILLARY DISSECTION;  Surgeon: Zahida Coronado MD;  Location: [facility];  Service: General    IN TONSILLECTOMY PRIMARY/SECONDARY AGE 12/> N/A 10/9/2024    Procedure: TONSILLECTOMY;  Surgeon: Juanito Matthew MD;  [facility];  Service: ENT    TUBAL LIGATION      US GUIDED BREAST BIOPSY RIGHT COMPLETE Right 2018    US GUIDED INJECTION FOR RESEARCH STUDY  2018    US GUIDED INJECTION FOR RESEARCH STUDY  2018    US GUIDED INJECTION FOR RESEARCH STUDY  2019    US GUIDED LYMPH NODE BIOPSY RIGHT  2023     Family History   Problem Relation Age of Onset    Colon cancer Mother 58    Hypertension Mother     Pancreatic cancer Father 60    Hypertension Daughter     Hypertension Son      Social History     Socioeconomic History    Marital status: Single     Spouse name: Not on file    Number of children: Not on file    Years of education: Not on file    Highest education level: Not on file   Occupational History    Not on file   Tobacco Use    Smoking status: Never     Passive exposure: Never    Smokeless tobacco: Never    Tobacco comments:     NO TOBACCO USE   Vaping Use    Vaping status: Never Used   Substance and Sexual Activity    Alcohol use: Never    Drug use: Never    Sexual activity: Not on file   Other Topics Concern    Not on file   Social  History Narrative    Not on file     Social Drivers of Health     Financial Resource Strain: Low Risk  (10/9/2023)    Overall Financial Resource Strain (CARDIA)     Difficulty of Paying Living Expenses: Not hard at all   Food Insecurity: No Food Insecurity (10/31/2024)    Hunger Vital Sign     Worried About Running Out of Food in the Last Year: Never true     Ran Out of Food in the Last Year: Never true   Transportation Needs: No Transportation Needs (10/31/2024)    PRAPARE - Transportation     Lack of Transportation (Medical): No     Lack of Transportation (Non-Medical): No   Recent Concern: Transportation Needs - Unmet Transportation Needs (10/29/2024)    Nursing - Transportation Risk Classification     Lack of Transportation: Not on file     Lack of Transportation: 1   Physical Activity: Not on file   Stress: Not on file   Social Connections: Not on file   Intimate Partner Violence: Unknown (10/29/2024)    Nursing IPS     Feels Physically and Emotionally Safe: Not on file     Physically Hurt by Someone: Not on file     Humiliated or Emotionally Abused by Someone: Not on file     Physically Hurt by Someone: 2     Hurt or Threatened by Someone: 2   Housing Stability: Low Risk  (10/31/2024)    Housing Stability Vital Sign     Unable to Pay for Housing in the Last Year: No     Number of Times Moved in the Last Year: 0     Homeless in the Last Year: No       Current Outpatient Medications:     acetaminophen (TYLENOL) 160 mg/5 mL liquid, Take 20 mL (640 mg total) by mouth every 6 (six) hours as needed for mild pain for up to 10 days, Disp: 473 mL, Rfl: 0    amLODIPine (NORVASC) 5 mg tablet, 1 tablet (5 mg total) by Per G Tube route daily, Disp: 30 tablet, Rfl: 0    anastrozole (ARIMIDEX) 1 mg tablet, Take 1 tablet (1 mg total) by mouth daily, Disp: 90 tablet, Rfl: 3    atorvastatin (LIPITOR) 40 mg tablet, Take 1 tablet (40 mg total) by mouth daily at bedtime, Disp: 30 tablet, Rfl: 2    diphenhydramine, lidocaine, Al/Mg  hydroxide, simethicone (Magic Mouthwash) SUSP, Swish and swallow 10 mL every 4 (four) hours as needed for mouth pain or discomfort (Patient not taking: Reported on 12/6/2024), Disp: 480 mL, Rfl: 1    docusate sodium (COLACE) 50 mg capsule, Take 1 capsule (50 mg total) by mouth 2 (two) times a day, Disp: 90 capsule, Rfl: 1    ergocalciferol (VITAMIN D2) 50,000 units, Take 1 capsule (50,000 Units total) by mouth once a week, Disp: 90 capsule, Rfl: 3    folic acid (FOLVITE) 1 mg tablet, Take 1 tablet (1 mg total) by mouth in the morning, Disp: 90 tablet, Rfl: 3    gabapentin (NEURONTIN) 300 mg capsule, Take 1 pills in the morning, 1 pill at lunch and 2 pills before bed, Disp: 120 capsule, Rfl: 0    hydrocortisone 1 % ointment, , Disp: , Rfl:     ibuprofen (MOTRIN) 100 mg/5 mL suspension, Take 20 mL (400 mg total) by mouth every 6 (six) hours as needed for moderate pain, Disp: 473 mL, Rfl: 0    lidocaine-prilocaine (EMLA) cream, Apply topically as needed for mild pain (Patient not taking: Reported on 12/6/2024), Disp: 30 g, Rfl: 3    losartan (COZAAR) 50 mg tablet, Take 1 tablet (50 mg total) by mouth daily, Disp: 90 tablet, Rfl: 5    magnesium Oxide (MAG-OX) 400 mg TABS, 1 tablet (400 mg total) by Per G Tube route 2 (two) times a day, Disp: 60 tablet, Rfl: 0    mirtazapine (REMERON) 15 mg tablet, Take 1 tablet (15 mg total) by mouth daily at bedtime Patient is also on a 30 mg tablet, for a total dose of 45 mg, Disp: 30 tablet, Rfl: 0    mirtazapine (REMERON) 30 mg tablet, Take 1 tablet (30 mg total) by mouth daily at bedtime, Disp: 30 tablet, Rfl: 0    omeprazole (PriLOSEC) 20 mg delayed release capsule, Take 1 capsule (20 mg total) by mouth daily, Disp: 30 capsule, Rfl: 5    ondansetron (ZOFRAN) 8 mg tablet, Take 1 tablet (8 mg total) by mouth every 8 (eight) hours as needed for nausea or vomiting, Disp: 90 tablet, Rfl: 2    oxyCODONE (ROXICODONE) 5 mg/5 mL solution, Take 5 mL (5 mg total) by mouth every 6 (six) hours  as needed for severe pain ((breakthrough pain)) Max Daily Amount: 20 mg, Disp: 473 mL, Rfl: 0    potassium chloride (Klor-Con M20) 20 mEq tablet, Take 1 tablet (20 mEq total) by mouth 2 (two) times a day, Disp: 90 tablet, Rfl: 1    potassium chloride 10% oral solution, 15 mL (20 mEq total) by Per G Tube route 2 (two) times a day, Disp: 473 mL, Rfl: 0    prochlorperazine (COMPAZINE) 10 mg tablet, Take 1 tablet (10 mg total) by mouth every 6 (six) hours as needed for nausea or vomiting, Disp: 90 tablet, Rfl: 2    pyridoxine (VITAMIN B6) 50 mg tablet, Take 1 tablet (50 mg total) by mouth daily, Disp: 90 tablet, Rfl: 3    vitamin B-12 (VITAMIN B-12) 1,000 mcg tablet, , Disp: , Rfl:   No current facility-administered medications for this visit.    Facility-Administered Medications Ordered in Other Visits:     alteplase (CATHFLO) injection 2 mg, 2 mg, Intracatheter, Q1MIN PRN, Harika Agostino, DO    alteplase (CATHFLO) injection 2 mg, 2 mg, Intracatheter, Q1MIN PRN, Harika Agostino, DO    magnesium sulfate 2 g in sodium chloride 0.9 % 1,000 mL IV, , Intravenous, Once PRN, Harika Agostino, DO  No Known Allergies  There were no vitals filed for this visit.

## 2024-12-20 NOTE — PROGRESS NOTES
According to parameters pt did not require mag replacement today as her last mag resulted was 2.0.  Bloodwork drawn today per order.  Pt tolerated treatment today without incident.  Pt declined AVS but is aware of her next appt on 12/23 at 12:00

## 2024-12-20 NOTE — PROGRESS NOTES
In-basket message received from Dr. Noel to add patient to the head and neck [facility] on 1/13/2025. Chart reviewed and prep completed.

## 2024-12-20 NOTE — PROGRESS NOTES
Follow-up Visit   Name: Maira Moura      : 1955      MRN: [id number]  Encounter Provider: Sami Tolbert MD  Encounter Date: 2024   Encounter department: [facility] RADIATION ONCOLOGY  :  Assessment & Plan  Right tonsillar squamous cell carcinoma (HCC)       Maira Moura is a 69 y.o. year old female with history of right breast carcinoma status post lumpectomy and radiation therapy in 2019.  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  She had cryoablation to the liver on Evelin 3, 2024.  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  She has been receiving treatment with 5-FU leucovorin and Nivolumab.  PET/CT study 2024 shows progression within the neck as well as the tonsil.  She was discussed with head and neck multidisciplinary case review on 2024 with recommendation to see Dr. Chapa 2024 and to consider radiation therapy along with chemotherapy.  She had previously been seen by dentistry/oral surgery and had dental extractions on 2023.  She has had a full upper denture in the last 10 years.  She had extractions of mandibular teeth that were in poor repair with 2 incisors that are remaining.   She was seen for consultation on 2024 for definitive radiation therapy to the head and neck region over 7 weeks/35 fractions with total dose of 7000 cGy along with concurrent chemotherapy with Dr. Diaz.  She declined upfront PEG tube placement and then developed mucositis such that PEG tube had to be placed during treatment.  She did have a nasopharynx/oropharynx biopsy on 2024 along with right and left tonsillectomy that were all negative for malignancy. She completed definitive  treatment on November 13, 2024.     She is seen for follow-up examination today.  She is recovering well from treatment and still has dysphagia.  She will have her Magic mouthwash refilled.  This helps her with eating soft foods.  She still remains on 6 cans of tube feeding daily along with hydration 3 days a week.  CT of the chest, abdomen, and pelvis on December 3, 2024 revealed mixed interval changes in her hepatic metastasis with some areas slightly smaller and other areas that are enlarged.  There was nonspecific enlargement along the left cervical region but no palpable disease noted today.  This may represent postradiation changes.  She saw Dr. Medina after the CAT scan and is scheduled for PET/CT study December 23, 2024 to assess the status of her disease.  Then she will see her again on December 24, 2024 to discuss results and make recommendations for possible further systemic therapy.  She also saw Dr. Matthew on December 19, 2024 with recommendations to discuss her again at tumor board after her PET/CT study.  She will see Dr. Matthew again on January 30, 2025.  She also has an appoint with Dr. Baltazar on January 10, 2025 as she remains on oxycodone as well as gabapentin for her pain.  She will return here for follow-up in 4 months.  Metastatic colon cancer to liver (HCC)       See Above      History of Present Illness   Chief Complaint   Patient presents with    Follow-up   Pertinent Medical History   Maira Moura 1955 is a 69 y.o. female with history of malignant neoplasm of right breast and metastatic colon cancer to liver. 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 Today she presents for EOT visit.      12/3/24 CT CAP  IMPRESSION:   1.  Mixed interval changes in the known hepatic metastases. While the segment 8 and segment 3 lesions appear slightly smaller on the current study the segment 6 lesion has clearly enlarged, with trans-capsular extension. There  is a capsular cystic and   solid component/implant which is new since the prior study.  2.  New nonspecific enlargement along the left cervical region. While possibly due to volume averaging, a primary cervical mass or drop metastasis cannot be excluded. Further evaluation by gadolinium-enhanced MRI of the female pelvis is recommended.     24    Discussed CT findings that may show some progression. Will order PET CT to see what is active and what is from liver directed therapies. Will discuss at Lower GI tumor board. Discussed restarting systemic therapy and biopsies that are needed prior,will address with tumor board. She will take a break and and heal from her head and neck treatment. Follow up in a month. Hydration will continue 3 x and electrolytes as needed.      24 DR. Matthew ENT  Re discuss at tumor board after PET Ct. She is still in the process of healing.      Patient seen for follow-up today alone.  She reports she is feeling much better since the completion of treatment.  She has less dysphagia/soreness in the throat.  She is still using Magic mouthwash when she tries to eat soft foods.  She is out of the Magic mouthwash and this will be refilled.  She remains on 6 cans of tube feeding per day.  She is also having hydration 3 days a week on  and Friday.  She denies any abdominal pains or cramps.  She denies any nausea nor vomiting.    Upcomin24 PET CT   24 Dr. Medina  1/10/25 Dr. Fontana  25 Dr. Matthew     Oncology History   Oncology History   Malignant neoplasm of right female breast (HCC)   2018 Initial Diagnosis    Malignant neoplasm of right female breast (HCC)     2018 Biopsy    Rt Breast US BX 1100 8cmfn, 4 passes 12g Marquee:  - Invasive breast carcinoma of no special type (ductal NST/invasive ductal carcinoma).  - grade 2  - %  - IL 95%  - HER2 negative     2018 Surgery    Right partial mastectomy and SLN  "biopsy:  Infiltrating ductal carcinoma, Grade 2/3, felt to be between 2.0 cm and 2.5 cm in greatest dimension  - No invasive tumor is seen at inked margins, but there is a focus of DCIS seen within approximately 1mm of the inked medial margin  - no LVI  - no LCIS  - 0/2 LN's  at least Stage IIA - pT2, pN0, cM0, G2.    - Dr Coronado     12/20/2018 -  Cancer Staged    Stage IIA - pT2, pN0, cM0, G2.       1/4/2019 Genomic Testing    Oncotype DX score: 13     2/1/2019 -  Hormone Therapy    anastrozole 1 mg daily as adjuvant endocrine therapy    - Dr Daley       2/14/2019 - 4/3/2019 Radiation    Course: C1    Plan ID Energy Fractions Dose per Fraction (cGy) Dose Correction (cGy) Total Dose Delivered (cGy) Elapsed Days   R Breast 10X/6X 25 / 25 200 0 5,000 40   R BRST BOOST 16E 5 / 5 200 0 1,000 7      Treatment dates:  C1: 2/14/2019 - 4/3/2019       Metastatic colon cancer to liver (HCC)   7/6/2023 Genetic Testing    CARIS Results:   KRAS Pathogenic Variant Exon 2  p.G12D : lack of benefit of cetuximab, panitumumab Level 2   No other actionable mutation; MSI stable; TMB low   MiFOLOXAi Results: \"Decreased Benefit of FOLFOX + Bevacizumab in first line metastatic CRC.  This patient may achieve improved results by receiving an alternative to FOLFOX, such as FOLFIRI, as their initial regimen. As an adjustment to frontline FOLFOXIRI following toxicity: This patient may achieve improved results by removing the oxaliplatin portion of their regimen\".         7/11/2023 Initial Diagnosis    Metastatic colon cancer to liver (HCC)     7/13/2023 -  Cancer Staged    Staging form: Colon and Rectum, AJCC 8th Edition  - Clinical stage from 7/13/2023: cT3, cN0, pM1 - Signed by Harika Medina DO on 9/28/2023  Total positive nodes: 0       7/31/2023 - 8/1/2024 Chemotherapy    cyanocobalamin, 1,000 mcg, Intramuscular, Every 30 days, 7 of 9 cycles  Administration: 1,000 mcg (5/9/2024), 1,000 mcg (5/24/2024), 1,000 mcg (6/20/2024), 1,000 " mcg (7/3/2024), 1,000 mcg (7/18/2024), 1,000 mcg (8/1/2024)  potassium chloride, 20 mEq, Intravenous, Once, 2 of 2 cycles  Administration: 20 mEq (11/6/2023), 20 mEq (11/20/2023)  alteplase (CATHFLO), 2 mg, Intracatheter, Every 1 Minute as needed, 21 of 23 cycles  Administration: 2 mg (1/3/2024)  pegfilgrastim (NEULASTA), 6 mg, Subcutaneous, Once, 12 of 12 cycles  Administration: 6 mg (10/25/2023), 6 mg (11/8/2023), 6 mg (11/22/2023), 6 mg (12/6/2023), 6 mg (12/20/2023), 6 mg (1/12/2024), 6 mg (1/25/2024), 6 mg (4/25/2024), 6 mg (5/9/2024), 6 mg (5/24/2024), 6 mg (6/20/2024)  fluorouracil (ADRUCIL), 895 mg, Intravenous, Once, 21 of 23 cycles  Dose modification: 320 mg/m2 (original dose 400 mg/m2, Cycle 11)  Administration: 900 mg (7/31/2023), 900 mg (8/14/2023), 900 mg (8/28/2023), 900 mg (9/11/2023), 900 mg (9/25/2023), 900 mg (10/9/2023), 900 mg (10/23/2023), 895 mg (11/6/2023), 895 mg (11/20/2023), 895 mg (12/4/2023), 700 mg (12/18/2023), 680 mg (1/10/2024), 680 mg (1/23/2024), 625 mg (4/23/2024), 625 mg (5/7/2024), 625 mg (5/22/2024), 625 mg (6/4/2024), 625 mg (6/18/2024), 625 mg (7/1/2024), 625 mg (7/16/2024), 625 mg (7/30/2024)  nivolumab (OPDIVO) IVPB, 240 mg (100 % of original dose 240 mg), Intravenous, Once, 13 of 15 cycles  Dose modification: 240 mg (original dose 240 mg, Cycle 9)  Administration: 240 mg (11/20/2023), 240 mg (12/4/2023), 240 mg (12/18/2023), 240 mg (1/10/2024), 240 mg (1/23/2024), 240 mg (4/23/2024), 240 mg (5/7/2024), 240 mg (5/22/2024), 240 mg (6/4/2024), 240 mg (6/18/2024), 240 mg (7/1/2024), 240 mg (7/16/2024), 240 mg (7/30/2024)  leucovorin calcium IVPB, 896 mg, Intravenous, Once, 21 of 23 cycles  Administration: 900 mg (7/31/2023), 900 mg (8/14/2023), 900 mg (8/28/2023), 900 mg (9/11/2023), 900 mg (9/25/2023), 900 mg (10/9/2023), 900 mg (10/23/2023), 900 mg (11/6/2023), 900 mg (11/20/2023), 900 mg (12/4/2023), 900 mg (12/18/2023), 850 mg (1/10/2024), 850 mg (1/23/2024), 800 mg  (4/23/2024), 800 mg (5/7/2024), 800 mg (5/22/2024), 800 mg (6/4/2024), 800 mg (6/18/2024), 800 mg (7/1/2024), 800 mg (7/16/2024), 800 mg (7/30/2024)  oxaliplatin (ELOXATIN) chemo infusion, 85 mg/m2 = 190.4 mg, Intravenous, Once, 12 of 12 cycles  Dose modification: 68 mg/m2 (original dose 85 mg/m2, Cycle 11, Reason: Other (Must fill in a comment), Comment: increased fatigue)  Administration: 190.4 mg (7/31/2023), 190.4 mg (8/14/2023), 200 mg (8/28/2023), 200 mg (9/11/2023), 200 mg (9/25/2023), 200 mg (10/9/2023), 200 mg (10/23/2023), 200 mg (11/6/2023), 200 mg (11/20/2023), 190.4 mg (12/4/2023), 150 mg (12/18/2023), 150 mg (1/10/2024)  fluorouracil (ADRUCIL) ambulatory infusion Soln, 1,200 mg/m2/day = 5,375 mg, Intravenous, Over 46 hours, 21 of 23 cycles     9/26/2023 -  Cancer Staged    Staging form: Colon and Rectum, AJCC 8th Edition  - Pathologic: pT3, pN0, cM1 - Signed by Vickie Israel MD on 4/3/2024  Total positive nodes: 0       3/12/2024 Surgery    A. Terminal ileum, appendix, right colon (right hemicolectomy):  - Adenocarcinoma (5.2cm)  - Forty-nine (49) lymph nodes negative for carcinoma (0/49)    - Acellular mucin present in one (1) lymph node   - See staging synoptic (ypT3N0)     Right tonsillar squamous cell carcinoma (HCC)   7/27/2023 Initial Diagnosis    Right tonsillar squamous cell carcinoma (HCC)     7/27/2023 -  Cancer Staged    Staging form: Pharynx - Oropharynx, AJCC 8th Edition  - Clinical stage from 7/27/2023: Stage III (cT1, cN3, cM0, p16+) - Signed by Evan Plummer MD on 7/27/2023  Stage prefix: Initial diagnosis       9/16/2024 - 11/13/2024 Radiation      Plan ID Energy Fractions Dose per Fraction (cGy) Dose Correction (cGy) Total Dose Delivered (cGy) Elapsed Days   Head_Neck 6X-FFF 35 / 35 200 0 7,000 58    C2: 9/16/2024 - 11/13/2024 9/17/2024 - 10/21/2024 Chemotherapy    alteplase (CATHFLO), 2 mg, Intracatheter, Every 1 Minute as needed, 6 of 6 cycles  Administration: 2  mg (10/21/2024)  CISplatin (PLATINOL) infusion, 70 mg (original dose 40 mg/m2), Intravenous, Once, 6 of 6 cycles  Dose modification: 70 mg (original dose 40 mg/m2, Cycle 1, Reason: Anticipated Tolerance), 30 mg/m2 (original dose 40 mg/m2, Cycle 4, Reason: Neuropathy, Comment: dose reduction to 30 mg/m2 due to neuropathy)  Administration: 70 mg (9/17/2024), 70 mg (9/23/2024), 70 mg (9/30/2024), 59.1 mg (10/7/2024), 59.1 mg (10/14/2024), 59.1 mg (10/21/2024)  sodium chloride, 500 mL, Intravenous, Once, 6 of 6 cycles  Administration: 500 mL (9/17/2024), 500 mL (9/17/2024), 500 mL (9/23/2024), 500 mL (9/23/2024), 500 mL (9/30/2024), 500 mL (9/30/2024), 500 mL (10/7/2024), 500 mL (10/7/2024), 500 mL (10/14/2024), 500 mL (10/14/2024), 500 mL (10/21/2024)  fosaprepitant (EMEND) IVPB, 150 mg, Intravenous, Once, 6 of 6 cycles  Administration: 150 mg (9/17/2024), 150 mg (9/23/2024), 150 mg (9/30/2024), 150 mg (10/7/2024), 150 mg (10/14/2024), 150 mg (10/21/2024)  IVPB builder, , Intravenous, Once, 1 of 1 cycle  Administration: Unknown dose (10/21/2024)        Review of Systems Refer to nursing note.    Current Outpatient Medications on File Prior to Visit   Medication Sig Dispense Refill    acetaminophen (TYLENOL) 160 mg/5 mL liquid Take 20 mL (640 mg total) by mouth every 6 (six) hours as needed for mild pain for up to 10 days 473 mL 0    amLODIPine (NORVASC) 5 mg tablet 1 tablet (5 mg total) by Per G Tube route daily 30 tablet 0    anastrozole (ARIMIDEX) 1 mg tablet Take 1 tablet (1 mg total) by mouth daily 90 tablet 3    atorvastatin (LIPITOR) 40 mg tablet Take 1 tablet (40 mg total) by mouth daily at bedtime 30 tablet 2    ergocalciferol (VITAMIN D2) 50,000 units Take 1 capsule (50,000 Units total) by mouth once a week 90 capsule 3    folic acid (FOLVITE) 1 mg tablet Take 1 tablet (1 mg total) by mouth in the morning 90 tablet 3    gabapentin (NEURONTIN) 300 mg capsule Take 1 pills in the morning, 1 pill at lunch and 2  pills before bed 120 capsule 0    losartan (COZAAR) 50 mg tablet Take 1 tablet (50 mg total) by mouth daily 90 tablet 5    magnesium Oxide (MAG-OX) 400 mg TABS 1 tablet (400 mg total) by Per G Tube route 2 (two) times a day 60 tablet 0    mirtazapine (REMERON) 15 mg tablet Take 1 tablet (15 mg total) by mouth daily at bedtime Patient is also on a 30 mg tablet, for a total dose of 45 mg 30 tablet 0    mirtazapine (REMERON) 30 mg tablet Take 1 tablet (30 mg total) by mouth daily at bedtime 30 tablet 0    omeprazole (PriLOSEC) 20 mg delayed release capsule Take 1 capsule (20 mg total) by mouth daily 30 capsule 5    ondansetron (ZOFRAN) 8 mg tablet Take 1 tablet (8 mg total) by mouth every 8 (eight) hours as needed for nausea or vomiting 90 tablet 2    oxyCODONE (ROXICODONE) 5 mg/5 mL solution Take 5 mL (5 mg total) by mouth every 6 (six) hours as needed for severe pain ((breakthrough pain)) Max Daily Amount: 20 mg 473 mL 0    prochlorperazine (COMPAZINE) 10 mg tablet Take 1 tablet (10 mg total) by mouth every 6 (six) hours as needed for nausea or vomiting 90 tablet 2    pyridoxine (VITAMIN B6) 50 mg tablet Take 1 tablet (50 mg total) by mouth daily 90 tablet 3    vitamin B-12 (VITAMIN B-12) 1,000 mcg tablet       [DISCONTINUED] diphenhydramine, lidocaine, Al/Mg hydroxide, simethicone (Magic Mouthwash) SUSP Swish and swallow 10 mL every 4 (four) hours as needed for mouth pain or discomfort 480 mL 1    docusate sodium (COLACE) 50 mg capsule Take 1 capsule (50 mg total) by mouth 2 (two) times a day (Patient not taking: Reported on 12/20/2024) 90 capsule 1    hydrocortisone 1 % ointment  (Patient not taking: Reported on 12/20/2024)      ibuprofen (MOTRIN) 100 mg/5 mL suspension Take 20 mL (400 mg total) by mouth every 6 (six) hours as needed for moderate pain (Patient not taking: Reported on 12/20/2024) 473 mL 0    lidocaine-prilocaine (EMLA) cream Apply topically as needed for mild pain (Patient not taking: Reported on  "12/6/2024) 30 g 3    potassium chloride (Klor-Con M20) 20 mEq tablet Take 1 tablet (20 mEq total) by mouth 2 (two) times a day (Patient not taking: Reported on 12/20/2024) 90 tablet 1    potassium chloride 10% oral solution 15 mL (20 mEq total) by Per G Tube route 2 (two) times a day 473 mL 0     Current Facility-Administered Medications on File Prior to Visit   Medication Dose Route Frequency Provider Last Rate Last Admin    alteplase (CATHFLO) injection 2 mg  2 mg Intracatheter Q1MIN PRN Harika Agostino, DO        alteplase (CATHFLO) injection 2 mg  2 mg Intracatheter Q1MIN PRN Harika Agostino, DO        magnesium sulfate 2 g in sodium chloride 0.9 % 1,000 mL IV   Intravenous Once PRN Harika Agostino, DO        [COMPLETED] sodium chloride 0.9 % bolus 1,000 mL  1,000 mL Intravenous Once PRN Harika Agostino, DO   Stopped at 12/20/24 1421      Social History     Tobacco Use    Smoking status: Never     Passive exposure: Never    Smokeless tobacco: Never    Tobacco comments:     NO TOBACCO USE   Vaping Use    Vaping status: Never Used   Substance and Sexual Activity    Alcohol use: Never    Drug use: Never    Sexual activity: Not on file         Objective   /81 (BP Location: Right arm)   Pulse 81   Temp 97.5 °F (36.4 °C) (Temporal)   Resp 16   Ht 5' 6\" (1.676 m)   Wt 82.1 kg (181 lb)   SpO2 95%   BMI 29.21 kg/m²     Pain Screening:  Pain Score:   5  ECOG ECOG Performance Status: 1 - Restricted in physically strenuous activity but ambulatory and able to carry out work of a light or sedentary nature, e.g., light house work, office work  Physical Exam   Constitutional:       General: She is not in acute distress.     Appearance: Normal appearance. She is well-developed. She is not diaphoretic.   HENT:      Head: Normocephalic and atraumatic.      Mouth/Throat: Oral cavity reveals no evidence of any masses nor evidence of any thrush.     Pharynx: No oropharyngeal exudate and no masses/lesions in the tonsillar " regions.   Eyes:      General: No scleral icterus.     Conjunctiva/sclera: Conjunctivae normal.      Pupils: Pupils are equal, round, and reactive to light.   Neck:      Thyroid: No thyromegaly.      Trachea: No tracheal deviation.   Cardiovascular:      Rate and Rhythm: Normal rate and regular rhythm.      Heart sounds: Normal heart sounds.   Pulmonary:      Effort: Pulmonary effort is normal. No respiratory distress.      Breath sounds: Normal breath sounds. No stridor. No wheezing, rhonchi or rales.   Chest:      Chest wall: No tenderness.   Abdominal:      General: Bowel sounds are normal. There is no distension.      Palpations: Abdomen is soft. There is no mass.      Tenderness: There is no abdominal tenderness.   Musculoskeletal:         General: No tenderness. Normal range of motion.      Cervical back: Normal range of motion and neck supple.   Lymphadenopathy:      Cervical: No cervical adenopathy.      Upper Body:      Right upper body: No supraclavicular adenopathy.      Left upper body: No supraclavicular adenopathy.   Skin:     General: Skin is warm and dry.  She has some hyperpigmentation and postradiation changes of the neck skin and dryness bilaterally.     Coloration: Skin is not jaundiced or pale.      Findings: No erythema or rash.   Neurological:      General: No focal deficit present.      Mental Status: She is alert and oriented to person, place, and time.      Cranial Nerves: No cranial nerve deficit.      Coordination: Coordination normal.   Psychiatric:         Mood and Affect: Mood normal.         Behavior: Behavior normal.         Thought Content: Thought content normal.         Judgment: Judgment normal.   Administrative Statements   I have spent a total time of 20 minutes in caring for this patient on the day of the visit/encounter including Diagnostic results, Prognosis, Risks and benefits of tx options, Instructions for management, Patient and family education, Impressions, Documenting  "in the medical record, Reviewing / ordering tests, medicine, procedures  , and Obtaining or reviewing history  .   Portions of the record may have been created with voice recognition software.  Occasional wrong word or \"sound a like\" substitutions may have occurred due to the inherent limitations of voice recognition software.  Read the chart carefully and recognize, using context, where substitutions have occurred.  "

## 2024-12-20 NOTE — ASSESSMENT & PLAN NOTE
Maira Moura is a 69 y.o. year old female with history of right breast carcinoma status post lumpectomy and radiation therapy in 2019.  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  She had cryoablation to the liver on Evelin 3, 2024.  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  She has been receiving treatment with 5-FU leucovorin and Nivolumab.  PET/CT study August 5, 2024 shows progression within the neck as well as the tonsil.  She was discussed with head and neck multidisciplinary case review on August 12, 2024 with recommendation to see Dr. Chapa August 16, 2024 and to consider radiation therapy along with chemotherapy.  She had previously been seen by dentistry/oral surgery and had dental extractions on September 1, 2023.  She has had a full upper denture in the last 10 years.  She had extractions of mandibular teeth that were in poor repair with 2 incisors that are remaining.   She was seen for consultation on August 20, 2024 for definitive radiation therapy to the head and neck region over 7 weeks/35 fractions with total dose of 7000 cGy along with concurrent chemotherapy with Dr. Diaz.  She declined upfront PEG tube placement and then developed mucositis such that PEG tube had to be placed during treatment.  She did have a nasopharynx/oropharynx biopsy on October 9, 2024 along with right and left tonsillectomy that were all negative for malignancy. She completed definitive treatment on November 13, 2024.     She is seen for follow-up examination today.  She is recovering well from treatment and still has dysphagia.  She will have her Magic mouthwash refilled.  This helps her with eating soft foods.  She still remains on 6 cans of tube feeding daily along with hydration 3 days a  week.  CT of the chest, abdomen, and pelvis on December 3, 2024 revealed mixed interval changes in her hepatic metastasis with some areas slightly smaller and other areas that are enlarged.  There was nonspecific enlargement along the left cervical region but no palpable disease noted today.  This may represent postradiation changes.  She saw Dr. Medina after the CAT scan and is scheduled for PET/CT study December 23, 2024 to assess the status of her disease.  Then she will see her again on December 24, 2024 to discuss results and make recommendations for possible further systemic therapy.  She also saw Dr. Matthew on December 19, 2024 with recommendations to discuss her again at tumor board after her PET/CT study.  She will see Dr. Matthew again on January 30, 2025.  She also has an appoint with Dr. Baltazar on January 10, 2025 as she remains on oxycodone as well as gabapentin for her pain.  She will return here for follow-up in 4 months.

## 2024-12-23 ENCOUNTER — PATIENT OUTREACH (OUTPATIENT)
Dept: HEMATOLOGY ONCOLOGY | Facility: CLINIC | Age: 69
End: 2024-12-23

## 2024-12-23 ENCOUNTER — HOSPITAL ENCOUNTER (OUTPATIENT)
Dept: RADIOLOGY | Age: 69
Discharge: HOME | End: 2024-12-23

## 2024-12-23 ENCOUNTER — HOSPITAL ENCOUNTER (OUTPATIENT)
Dept: INFUSION CENTER | Facility: CLINIC | Age: 69
Discharge: HOME | End: 2024-12-23
Payer: MEDICARE

## 2024-12-23 ENCOUNTER — TELEPHONE (OUTPATIENT)
Age: 69
End: 2024-12-23

## 2024-12-23 VITALS
DIASTOLIC BLOOD PRESSURE: 68 MMHG | RESPIRATION RATE: 18 BRPM | SYSTOLIC BLOOD PRESSURE: 107 MMHG | HEART RATE: 89 BPM | TEMPERATURE: 98.2 F

## 2024-12-23 DIAGNOSIS — C09.9 RIGHT TONSILLAR SQUAMOUS CELL CARCINOMA (HCC): ICD-10-CM

## 2024-12-23 DIAGNOSIS — C50.411 MALIGNANT NEOPLASM OF UPPER-OUTER QUADRANT OF RIGHT BREAST IN FEMALE, ESTROGEN RECEPTOR POSITIVE (HCC): Primary | ICD-10-CM

## 2024-12-23 DIAGNOSIS — Z17.0 MALIGNANT NEOPLASM OF UPPER-OUTER QUADRANT OF RIGHT BREAST IN FEMALE, ESTROGEN RECEPTOR POSITIVE (HCC): Primary | ICD-10-CM

## 2024-12-23 LAB
ALBUMIN SERPL BCG-MCNC: 3.6 G/DL (ref 3.5–5)
ALP SERPL-CCNC: 81 U/L (ref 34–104)
ALT SERPL W P-5'-P-CCNC: 9 U/L (ref 7–52)
ANION GAP SERPL CALCULATED.3IONS-SCNC: 5 MMOL/L (ref 4–13)
AST SERPL W P-5'-P-CCNC: 25 U/L (ref 13–39)
BASOPHILS # BLD AUTO: 0.03 THOUSANDS/ÂΜL (ref 0–0.1)
BASOPHILS NFR BLD AUTO: 1 % (ref 0–1)
BILIRUB SERPL-MCNC: 0.52 MG/DL (ref 0.2–1)
BUN SERPL-MCNC: 16 MG/DL (ref 5–25)
CALCIUM SERPL-MCNC: 9.5 MG/DL (ref 8.4–10.2)
CHLORIDE SERPL-SCNC: 103 MMOL/L (ref 96–108)
CO2 SERPL-SCNC: 30 MMOL/L (ref 21–32)
CREAT SERPL-MCNC: 1.2 MG/DL (ref 0.6–1.3)
EOSINOPHIL # BLD AUTO: 0.15 THOUSAND/ÂΜL (ref 0–0.61)
EOSINOPHIL NFR BLD AUTO: 3 % (ref 0–6)
ERYTHROCYTE [DISTWIDTH] IN BLOOD BY AUTOMATED COUNT: 17.7 % (ref 11.6–15.1)
GFR SERPL CREATININE-BSD FRML MDRD: 46 ML/MIN/1.73SQ M
GLUCOSE SERPL-MCNC: 93 MG/DL (ref 65–140)
HCT VFR BLD AUTO: 24.7 % (ref 34.8–46.1)
HGB BLD-MCNC: 8 G/DL (ref 11.5–15.4)
IMM GRANULOCYTES # BLD AUTO: 0.01 THOUSAND/UL (ref 0–0.2)
IMM GRANULOCYTES NFR BLD AUTO: 0 % (ref 0–2)
LYMPHOCYTES # BLD AUTO: 0.63 THOUSANDS/ÂΜL (ref 0.6–4.47)
LYMPHOCYTES NFR BLD AUTO: 12 % (ref 14–44)
MAGNESIUM SERPL-MCNC: 1.7 MG/DL (ref 1.9–2.7)
MCH RBC QN AUTO: 30.4 PG (ref 26.8–34.3)
MCHC RBC AUTO-ENTMCNC: 32.4 G/DL (ref 31.4–37.4)
MCV RBC AUTO: 94 FL (ref 82–98)
MONOCYTES # BLD AUTO: 0.6 THOUSAND/ÂΜL (ref 0.17–1.22)
MONOCYTES NFR BLD AUTO: 11 % (ref 4–12)
NEUTROPHILS # BLD AUTO: 3.85 THOUSANDS/ÂΜL (ref 1.85–7.62)
NEUTS SEG NFR BLD AUTO: 73 % (ref 43–75)
NRBC BLD AUTO-RTO: 0 /100 WBCS
PLATELET # BLD AUTO: 263 THOUSANDS/UL (ref 149–390)
PMV BLD AUTO: 10.6 FL (ref 8.9–12.7)
POTASSIUM SERPL-SCNC: 4.1 MMOL/L (ref 3.5–5.3)
PROT SERPL-MCNC: 7.2 G/DL (ref 6.4–8.4)
RBC # BLD AUTO: 2.63 MILLION/UL (ref 3.81–5.12)
SODIUM SERPL-SCNC: 138 MMOL/L (ref 135–147)
WBC # BLD AUTO: 5.27 THOUSAND/UL (ref 4.31–10.16)

## 2024-12-23 PROCEDURE — 83735 ASSAY OF MAGNESIUM: CPT | Performed by: INTERNAL MEDICINE

## 2024-12-23 PROCEDURE — 80053 COMPREHEN METABOLIC PANEL: CPT | Performed by: INTERNAL MEDICINE

## 2024-12-23 PROCEDURE — 85025 COMPLETE CBC W/AUTO DIFF WBC: CPT | Performed by: INTERNAL MEDICINE

## 2024-12-23 RX ADMIN — MAGNESIUM SULFATE HEPTAHYDRATE: 500 INJECTION, SOLUTION INTRAMUSCULAR; INTRAVENOUS at 12:19

## 2024-12-23 NOTE — PROGRESS NOTES
Patient tolerated hydration without complication. AVS declined, aware to return on Thursday @ 12:00, patient left unit in stable condition.

## 2024-12-23 NOTE — PROGRESS NOTES
Pt called to confirm her  time for transportation for todays radiation appointment. Confirmed via round trip she has an 11:40am   time  She understood, and was thankful for the assistance

## 2024-12-23 NOTE — TELEPHONE ENCOUNTER
Spoke with patient regarding her PET CT being moved. Patient states that they called her this morning and informed her that they are out of IV dye and r/s her to Friday. I told her that we will cancel the appt for tomorrow and I will call her back with a new follow-up appt date/time. Patient verbalized understanding.

## 2024-12-24 ENCOUNTER — TELEPHONE (OUTPATIENT)
Dept: HEMATOLOGY ONCOLOGY | Facility: CLINIC | Age: 69
End: 2024-12-24

## 2024-12-26 ENCOUNTER — HOSPITAL ENCOUNTER (OUTPATIENT)
Dept: INFUSION CENTER | Facility: CLINIC | Age: 69
Discharge: HOME | End: 2024-12-26
Payer: MEDICARE

## 2024-12-26 ENCOUNTER — DOCUMENTATION (OUTPATIENT)
Dept: HEMATOLOGY ONCOLOGY | Facility: CLINIC | Age: 69
End: 2024-12-26

## 2024-12-26 DIAGNOSIS — C50.411 MALIGNANT NEOPLASM OF UPPER-OUTER QUADRANT OF RIGHT BREAST IN FEMALE, ESTROGEN RECEPTOR POSITIVE (HCC): Primary | ICD-10-CM

## 2024-12-26 DIAGNOSIS — Z17.0 MALIGNANT NEOPLASM OF UPPER-OUTER QUADRANT OF RIGHT BREAST IN FEMALE, ESTROGEN RECEPTOR POSITIVE (HCC): Primary | ICD-10-CM

## 2024-12-26 LAB
ALBUMIN SERPL BCG-MCNC: 3.4 G/DL (ref 3.5–5)
ALP SERPL-CCNC: 78 U/L (ref 34–104)
ALT SERPL W P-5'-P-CCNC: 8 U/L (ref 7–52)
ANION GAP SERPL CALCULATED.3IONS-SCNC: 6 MMOL/L (ref 4–13)
AST SERPL W P-5'-P-CCNC: 28 U/L (ref 13–39)
BASOPHILS # BLD AUTO: 0.04 THOUSANDS/ÂΜL (ref 0–0.1)
BASOPHILS NFR BLD AUTO: 1 % (ref 0–1)
BILIRUB SERPL-MCNC: 0.42 MG/DL (ref 0.2–1)
BUN SERPL-MCNC: 15 MG/DL (ref 5–25)
CALCIUM ALBUM COR SERPL-MCNC: 9.7 MG/DL (ref 8.3–10.1)
CALCIUM SERPL-MCNC: 9.2 MG/DL (ref 8.4–10.2)
CHLORIDE SERPL-SCNC: 103 MMOL/L (ref 96–108)
CO2 SERPL-SCNC: 28 MMOL/L (ref 21–32)
CREAT SERPL-MCNC: 1.24 MG/DL (ref 0.6–1.3)
EOSINOPHIL # BLD AUTO: 0.12 THOUSAND/ÂΜL (ref 0–0.61)
EOSINOPHIL NFR BLD AUTO: 2 % (ref 0–6)
ERYTHROCYTE [DISTWIDTH] IN BLOOD BY AUTOMATED COUNT: 17.4 % (ref 11.6–15.1)
GFR SERPL CREATININE-BSD FRML MDRD: 44 ML/MIN/1.73SQ M
GLUCOSE SERPL-MCNC: 81 MG/DL (ref 65–140)
HCT VFR BLD AUTO: 25.9 % (ref 34.8–46.1)
HGB BLD-MCNC: 8.5 G/DL (ref 11.5–15.4)
IMM GRANULOCYTES # BLD AUTO: 0.01 THOUSAND/UL (ref 0–0.2)
IMM GRANULOCYTES NFR BLD AUTO: 0 % (ref 0–2)
LYMPHOCYTES # BLD AUTO: 0.59 THOUSANDS/ÂΜL (ref 0.6–4.47)
LYMPHOCYTES NFR BLD AUTO: 9 % (ref 14–44)
MAGNESIUM SERPL-MCNC: 1.7 MG/DL (ref 1.9–2.7)
MCH RBC QN AUTO: 30.6 PG (ref 26.8–34.3)
MCHC RBC AUTO-ENTMCNC: 32.8 G/DL (ref 31.4–37.4)
MCV RBC AUTO: 93 FL (ref 82–98)
MONOCYTES # BLD AUTO: 0.62 THOUSAND/ÂΜL (ref 0.17–1.22)
MONOCYTES NFR BLD AUTO: 9 % (ref 4–12)
NEUTROPHILS # BLD AUTO: 5.41 THOUSANDS/ÂΜL (ref 1.85–7.62)
NEUTS SEG NFR BLD AUTO: 79 % (ref 43–75)
NRBC BLD AUTO-RTO: 0 /100 WBCS
PLATELET # BLD AUTO: 325 THOUSANDS/UL (ref 149–390)
PMV BLD AUTO: 10.4 FL (ref 8.9–12.7)
POTASSIUM SERPL-SCNC: 3.6 MMOL/L (ref 3.5–5.3)
PROT SERPL-MCNC: 7.2 G/DL (ref 6.4–8.4)
RBC # BLD AUTO: 2.78 MILLION/UL (ref 3.81–5.12)
SODIUM SERPL-SCNC: 137 MMOL/L (ref 135–147)
WBC # BLD AUTO: 6.79 THOUSAND/UL (ref 4.31–10.16)

## 2024-12-26 PROCEDURE — 80053 COMPREHEN METABOLIC PANEL: CPT | Performed by: INTERNAL MEDICINE

## 2024-12-26 PROCEDURE — 85025 COMPLETE CBC W/AUTO DIFF WBC: CPT | Performed by: INTERNAL MEDICINE

## 2024-12-26 PROCEDURE — 83735 ASSAY OF MAGNESIUM: CPT | Performed by: INTERNAL MEDICINE

## 2024-12-26 PROCEDURE — 96365 THER/PROPH/DIAG IV INF INIT: CPT

## 2024-12-26 RX ADMIN — MAGNESIUM SULFATE HEPTAHYDRATE: 500 INJECTION, SOLUTION INTRAMUSCULAR; INTRAVENOUS at 12:49

## 2024-12-26 NOTE — PROGRESS NOTES
In-basket message received from Dr. Medina to move patient to the lower GI [facility] on 1/9/2025. Chart reviewed and prep completed.

## 2024-12-27 ENCOUNTER — HOSPITAL ENCOUNTER (OUTPATIENT)
Dept: RADIOLOGY | Age: 69
Discharge: HOME | End: 2024-12-27
Payer: MEDICARE

## 2024-12-27 ENCOUNTER — HOSPITAL ENCOUNTER (OUTPATIENT)
Dept: INFUSION CENTER | Facility: CLINIC | Age: 69
End: 2024-12-27
Payer: MEDICARE

## 2024-12-27 DIAGNOSIS — C50.411 MALIGNANT NEOPLASM OF UPPER-OUTER QUADRANT OF RIGHT BREAST IN FEMALE, ESTROGEN RECEPTOR POSITIVE (HCC): Primary | ICD-10-CM

## 2024-12-27 DIAGNOSIS — Z17.0 MALIGNANT NEOPLASM OF UPPER-OUTER QUADRANT OF RIGHT BREAST IN FEMALE, ESTROGEN RECEPTOR POSITIVE (HCC): Primary | ICD-10-CM

## 2024-12-27 LAB
ALBUMIN SERPL BCG-MCNC: 3.5 G/DL (ref 3.5–5)
ALP SERPL-CCNC: 78 U/L (ref 34–104)
ALT SERPL W P-5'-P-CCNC: 8 U/L (ref 7–52)
ANION GAP SERPL CALCULATED.3IONS-SCNC: 8 MMOL/L (ref 4–13)
AST SERPL W P-5'-P-CCNC: 29 U/L (ref 13–39)
BASOPHILS # BLD AUTO: 0.04 THOUSANDS/ÂΜL (ref 0–0.1)
BASOPHILS NFR BLD AUTO: 1 % (ref 0–1)
BILIRUB SERPL-MCNC: 0.42 MG/DL (ref 0.2–1)
BUN SERPL-MCNC: 15 MG/DL (ref 5–25)
CALCIUM SERPL-MCNC: 9.4 MG/DL (ref 8.4–10.2)
CHLORIDE SERPL-SCNC: 104 MMOL/L (ref 96–108)
CO2 SERPL-SCNC: 26 MMOL/L (ref 21–32)
CREAT SERPL-MCNC: 1.02 MG/DL (ref 0.6–1.3)
EOSINOPHIL # BLD AUTO: 0.13 THOUSAND/ÂΜL (ref 0–0.61)
EOSINOPHIL NFR BLD AUTO: 2 % (ref 0–6)
ERYTHROCYTE [DISTWIDTH] IN BLOOD BY AUTOMATED COUNT: 17.5 % (ref 11.6–15.1)
GFR SERPL CREATININE-BSD FRML MDRD: 56 ML/MIN/1.73SQ M
GLUCOSE SERPL-MCNC: 89 MG/DL (ref 65–140)
GLUCOSE SERPL-MCNC: 92 MG/DL (ref 65–140)
HCT VFR BLD AUTO: 25.1 % (ref 34.8–46.1)
HGB BLD-MCNC: 8.1 G/DL (ref 11.5–15.4)
IMM GRANULOCYTES # BLD AUTO: 0.02 THOUSAND/UL (ref 0–0.2)
IMM GRANULOCYTES NFR BLD AUTO: 0 % (ref 0–2)
LYMPHOCYTES # BLD AUTO: 0.61 THOUSANDS/ÂΜL (ref 0.6–4.47)
LYMPHOCYTES NFR BLD AUTO: 11 % (ref 14–44)
MAGNESIUM SERPL-MCNC: 2.1 MG/DL (ref 1.9–2.7)
MCH RBC QN AUTO: 30.2 PG (ref 26.8–34.3)
MCHC RBC AUTO-ENTMCNC: 32.3 G/DL (ref 31.4–37.4)
MCV RBC AUTO: 94 FL (ref 82–98)
MONOCYTES # BLD AUTO: 0.58 THOUSAND/ÂΜL (ref 0.17–1.22)
MONOCYTES NFR BLD AUTO: 11 % (ref 4–12)
NEUTROPHILS # BLD AUTO: 4.08 THOUSANDS/ÂΜL (ref 1.85–7.62)
NEUTS SEG NFR BLD AUTO: 75 % (ref 43–75)
NRBC BLD AUTO-RTO: 0 /100 WBCS
PLATELET # BLD AUTO: 253 THOUSANDS/UL (ref 149–390)
PMV BLD AUTO: 10.6 FL (ref 8.9–12.7)
POTASSIUM SERPL-SCNC: 4.1 MMOL/L (ref 3.5–5.3)
PROT SERPL-MCNC: 7.3 G/DL (ref 6.4–8.4)
RBC # BLD AUTO: 2.68 MILLION/UL (ref 3.81–5.12)
SODIUM SERPL-SCNC: 138 MMOL/L (ref 135–147)
WBC # BLD AUTO: 5.46 THOUSAND/UL (ref 4.31–10.16)

## 2024-12-27 PROCEDURE — 85025 COMPLETE CBC W/AUTO DIFF WBC: CPT | Performed by: INTERNAL MEDICINE

## 2024-12-27 PROCEDURE — A9552 F18 FDG: HCPCS

## 2024-12-27 PROCEDURE — 80053 COMPREHEN METABOLIC PANEL: CPT | Performed by: INTERNAL MEDICINE

## 2024-12-27 PROCEDURE — 82948 REAGENT STRIP/BLOOD GLUCOSE: CPT

## 2024-12-27 PROCEDURE — 78815 PET IMAGE W/CT SKULL-THIGH: CPT

## 2024-12-27 PROCEDURE — 96365 THER/PROPH/DIAG IV INF INIT: CPT

## 2024-12-27 PROCEDURE — 83735 ASSAY OF MAGNESIUM: CPT | Performed by: INTERNAL MEDICINE

## 2024-12-27 RX ADMIN — MAGNESIUM SULFATE HEPTAHYDRATE: 500 INJECTION, SOLUTION INTRAMUSCULAR; INTRAVENOUS at 12:22

## 2024-12-27 NOTE — PROGRESS NOTES
Pt presents for magnesium. No new meds or concerns. Pt tolerated treatment without adverse reaction. Future appointments discussed, confirmed with patient for 12/30/2024 1100. No blood return from the port at the end of the infusion. Pt wished to go home rather than to instill cathflo. Pt aware she may sit a while the next time her port is accessed. AVS declined.

## 2024-12-27 NOTE — LETTER
[facility]  [address] [place] [address]      January 2, 2025    MRN: [id number]     Phone: [phone]     Dear Ms. Moura,    You recently had a(n) Nuclear Medicine performed on 12/27/2024 at  [facility] that was requested by Gonzalo Espinal MD. The study was reviewed by a radiologist, which is a physician who specializes in medical imaging. The radiologist issued a report describing his or her findings. In that report there was a finding that the radiologist felt warranted further discussion with your health care provider and that discussion would be beneficial to you.      The results were sent to Gonzalo Espinal MD on 12/27/2024  1:34 PM. We recommend that you contact Gonzalo Espinal MD at [phone] or set up an appointment to discuss the results of the imaging test. If you have already heard from Gonzalo Espinal MD regarding the results of your study, you can disregard this letter.     This letter is not meant to alarm you, but intended to encourage you to follow-up on your results with the provider that sent you for the imaging study. In addition, we have enclosed answers to frequently asked questions by other patients who have also received a letter to review results with their health care provider (see page two).      Thank you for choosing [facility] for your medical imaging needs.                                                                                                                                                        FREQUENTLY ASKED QUESTIONS    Why am I receiving this letter?  [place] State Law requires us to notify patients who have findings on imaging exams that may require more testing or follow-up with a health professional within the next 3 months.        How serious is the finding on the imaging test?  This letter is sent to all patients who may need follow-up or more testing within the next 3 months.   Receiving this letter does not necessarily mean you have a life-threatening imaging finding or disease.  Recommendations in the radiologist’s imaging report are general in nature and it is up to your healthcare provider to say whether those recommendations make sense for your situation.  You are strongly encouraged to talk to your health care provider about the results and ask whether additional steps need to be taken.    Where can I get a copy of the final report for my recent radiology exam?  To get a full copy of the report you can access your records online at [email or web address] or please contact [facility]’s Medical Records Department at [phone] Monday through Friday between 8 am and 6 pm.         What do I need to do now?           Please contact your health care provider who requested the imaging study to discuss what further actions (if any) are needed.  You may have already heard from (your ordering provider) in regard to this test in which case you can disregard this letter.        NOTICE IN ACCORDANCE WITH THE [place] STATE “PATIENT TEST RESULT INFORMATION ACT OF 2018”    You are receiving this notice as a result of a determination by your diagnostic imaging service that further discussions of your test results are warranted and would be beneficial to you.    The complete results of your test or tests have been or will be sent to the health care practitioner that ordered the test or tests. It is recommended that you contact your health care practitioner to discuss your results as soon as possible.

## 2024-12-30 ENCOUNTER — HOSPITAL ENCOUNTER (OUTPATIENT)
Dept: INFUSION CENTER | Facility: CLINIC | Age: 69
Discharge: HOME | End: 2024-12-30
Payer: MEDICARE

## 2024-12-30 VITALS
DIASTOLIC BLOOD PRESSURE: 83 MMHG | TEMPERATURE: 96.4 F | RESPIRATION RATE: 18 BRPM | HEART RATE: 111 BPM | SYSTOLIC BLOOD PRESSURE: 129 MMHG

## 2024-12-30 DIAGNOSIS — Z17.0 MALIGNANT NEOPLASM OF UPPER-OUTER QUADRANT OF RIGHT BREAST IN FEMALE, ESTROGEN RECEPTOR POSITIVE (HCC): Primary | ICD-10-CM

## 2024-12-30 DIAGNOSIS — C50.411 MALIGNANT NEOPLASM OF UPPER-OUTER QUADRANT OF RIGHT BREAST IN FEMALE, ESTROGEN RECEPTOR POSITIVE (HCC): Primary | ICD-10-CM

## 2024-12-30 LAB
ALBUMIN SERPL BCG-MCNC: 3.6 G/DL (ref 3.5–5)
ALP SERPL-CCNC: 82 U/L (ref 34–104)
ALT SERPL W P-5'-P-CCNC: 8 U/L (ref 7–52)
ANION GAP SERPL CALCULATED.3IONS-SCNC: 8 MMOL/L (ref 4–13)
AST SERPL W P-5'-P-CCNC: 32 U/L (ref 13–39)
BASOPHILS # BLD AUTO: 0.02 THOUSANDS/ΜL (ref 0–0.1)
BASOPHILS NFR BLD AUTO: 0 % (ref 0–1)
BILIRUB SERPL-MCNC: 0.53 MG/DL (ref 0.2–1)
BUN SERPL-MCNC: 16 MG/DL (ref 5–25)
CALCIUM SERPL-MCNC: 9.6 MG/DL (ref 8.4–10.2)
CHLORIDE SERPL-SCNC: 104 MMOL/L (ref 96–108)
CO2 SERPL-SCNC: 27 MMOL/L (ref 21–32)
CREAT SERPL-MCNC: 1.25 MG/DL (ref 0.6–1.3)
EOSINOPHIL # BLD AUTO: 0.15 THOUSAND/ΜL (ref 0–0.61)
EOSINOPHIL NFR BLD AUTO: 3 % (ref 0–6)
ERYTHROCYTE [DISTWIDTH] IN BLOOD BY AUTOMATED COUNT: 17.3 % (ref 11.6–15.1)
GFR SERPL CREATININE-BSD FRML MDRD: 44 ML/MIN/1.73SQ M
GLUCOSE SERPL-MCNC: 103 MG/DL (ref 65–140)
HCT VFR BLD AUTO: 25.9 % (ref 34.8–46.1)
HGB BLD-MCNC: 8.4 G/DL (ref 11.5–15.4)
IMM GRANULOCYTES # BLD AUTO: 0.02 THOUSAND/UL (ref 0–0.2)
IMM GRANULOCYTES NFR BLD AUTO: 0 % (ref 0–2)
LYMPHOCYTES # BLD AUTO: 0.65 THOUSANDS/ΜL (ref 0.6–4.47)
LYMPHOCYTES NFR BLD AUTO: 12 % (ref 14–44)
MAGNESIUM SERPL-MCNC: 1.7 MG/DL (ref 1.9–2.7)
MCH RBC QN AUTO: 31 PG (ref 26.8–34.3)
MCHC RBC AUTO-ENTMCNC: 32.4 G/DL (ref 31.4–37.4)
MCV RBC AUTO: 96 FL (ref 82–98)
MONOCYTES # BLD AUTO: 0.57 THOUSAND/ΜL (ref 0.17–1.22)
MONOCYTES NFR BLD AUTO: 10 % (ref 4–12)
NEUTROPHILS # BLD AUTO: 4.06 THOUSANDS/ΜL (ref 1.85–7.62)
NEUTS SEG NFR BLD AUTO: 75 % (ref 43–75)
NRBC BLD AUTO-RTO: 0 /100 WBCS
PLATELET # BLD AUTO: 312 THOUSANDS/UL (ref 149–390)
PMV BLD AUTO: 10.6 FL (ref 8.9–12.7)
POTASSIUM SERPL-SCNC: 3.7 MMOL/L (ref 3.5–5.3)
PROT SERPL-MCNC: 7.8 G/DL (ref 6.4–8.4)
RBC # BLD AUTO: 2.71 MILLION/UL (ref 3.81–5.12)
SODIUM SERPL-SCNC: 139 MMOL/L (ref 135–147)
WBC # BLD AUTO: 5.47 THOUSAND/UL (ref 4.31–10.16)

## 2024-12-30 PROCEDURE — 85025 COMPLETE CBC W/AUTO DIFF WBC: CPT | Performed by: INTERNAL MEDICINE

## 2024-12-30 PROCEDURE — 83735 ASSAY OF MAGNESIUM: CPT | Performed by: INTERNAL MEDICINE

## 2024-12-30 PROCEDURE — 80053 COMPREHEN METABOLIC PANEL: CPT | Performed by: INTERNAL MEDICINE

## 2024-12-30 PROCEDURE — 96360 HYDRATION IV INFUSION INIT: CPT

## 2024-12-30 RX ADMIN — SODIUM CHLORIDE 1000 ML: 0.9 INJECTION, SOLUTION INTRAVENOUS at 11:21

## 2024-12-30 NOTE — PROGRESS NOTES
Maira Moura  tolerated 1L NS Hydration well with no complications.      Maira Moura is aware of future appt on Thursday Jan 2, 2025 2:00 PM.     AVS declined by Maira Moura.

## 2024-12-31 ENCOUNTER — OFFICE VISIT (OUTPATIENT)
Dept: HEMATOLOGY ONCOLOGY | Facility: CLINIC | Age: 69
End: 2024-12-31
Payer: MEDICARE

## 2024-12-31 ENCOUNTER — TELEPHONE (OUTPATIENT)
Dept: HEMATOLOGY ONCOLOGY | Facility: CLINIC | Age: 69
End: 2024-12-31

## 2024-12-31 VITALS
OXYGEN SATURATION: 99 % | WEIGHT: 182 LBS | DIASTOLIC BLOOD PRESSURE: 78 MMHG | RESPIRATION RATE: 16 BRPM | BODY MASS INDEX: 29.25 KG/M2 | HEIGHT: 66 IN | TEMPERATURE: 97.4 F | HEART RATE: 110 BPM | SYSTOLIC BLOOD PRESSURE: 120 MMHG

## 2024-12-31 DIAGNOSIS — C18.9 METASTATIC COLON CANCER TO LIVER (HCC): ICD-10-CM

## 2024-12-31 DIAGNOSIS — C09.9 RIGHT TONSILLAR SQUAMOUS CELL CARCINOMA (HCC): ICD-10-CM

## 2024-12-31 DIAGNOSIS — Z90.89 STATUS POST TONSILLECTOMY: Primary | ICD-10-CM

## 2024-12-31 DIAGNOSIS — K59.1 FUNCTIONAL DIARRHEA: Primary | ICD-10-CM

## 2024-12-31 DIAGNOSIS — C78.7 METASTATIC COLON CANCER TO LIVER (HCC): ICD-10-CM

## 2024-12-31 PROCEDURE — 99215 OFFICE O/P EST HI 40 MIN: CPT | Performed by: INTERNAL MEDICINE

## 2024-12-31 PROCEDURE — G2211 COMPLEX E/M VISIT ADD ON: HCPCS | Performed by: INTERNAL MEDICINE

## 2024-12-31 RX ORDER — SODIUM CHLORIDE 9 MG/ML
20 INJECTION, SOLUTION INTRAVENOUS ONCE
Status: CANCELLED | OUTPATIENT
Start: 2025-01-08

## 2024-12-31 RX ORDER — ATROPINE SULFATE 1 MG/ML
0.25 INJECTION, SOLUTION INTRAVENOUS ONCE AS NEEDED
Status: CANCELLED | OUTPATIENT
Start: 2025-01-08

## 2024-12-31 RX ORDER — ATROPINE SULFATE 1 MG/ML
0.25 INJECTION, SOLUTION INTRAVENOUS ONCE
Status: CANCELLED | OUTPATIENT
Start: 2025-01-08

## 2024-12-31 RX ORDER — CHOLESTYRAMINE 4 G/9G
1 POWDER, FOR SUSPENSION ORAL
Qty: 90 PACKET | Refills: 3 | Status: SHIPPED | OUTPATIENT
Start: 2024-12-31

## 2024-12-31 RX ORDER — FLUOROURACIL 50 MG/ML
400 INJECTION, SOLUTION INTRAVENOUS ONCE
Status: CANCELLED | OUTPATIENT
Start: 2025-01-08

## 2024-12-31 NOTE — ASSESSMENT & PLAN NOTE
Orders:    cholestyramine (QUESTRAN) 4 g packet; Take 1 packet (4 g total) by mouth 3 (three) times a day with meals

## 2024-12-31 NOTE — PROGRESS NOTES
Name: Maira Moura      : 1955      MRN: [id number]  Encounter Provider: Harika Medina DO  Encounter Date: 2024   Encounter department: [facility] [place]  :  Assessment & Plan  Functional diarrhea    Orders:    cholestyramine (QUESTRAN) 4 g packet; Take 1 packet (4 g total) by mouth 3 (three) times a day with meals    Metastatic colon cancer to liver (HCC)    Orders:    Infusion Calculated Appointment Request; Future    CBC and differential; Future    Comprehensive metabolic panel; Future    Infusion Calculated Appointment Request; Future    Patient is having clear progression of her colon cancer in the liver on the PET scan.  Unfortunately we are going to need to restart systemic chemotherapy.  Her liver lesion is rather large.  It is not causing any pain right now.  I cannot give her any more oxaliplatin due to the neuropathy after prior exposure of that medication and cisplatin.  I am going to offer her FOLFIRI with a 50% dose reduction of the irinotecan because of her pre-existing diarrhea.  I have given her cholestyramine to take 3 times a day.  I also wrote down for her to get Imodium over-the-counter and to take 2 pills when she has an episode of diarrhea and then 1 pill as needed throughout the rest of the day for repeat episodes.  She is getting hydration 3 times a week which we will continue.  I will see her after her first cycle of FOLFIRI to see how she tolerates it.  Risks and benefits of this regimen were discussed and consent was signed.  She does want to continue with active therapy.  Normally I would add Avastin to this but the patient would like to hold off on adding any more drugs at this time to see how she tolerates this regimen.  I stressed the potential for some very real toxicity from this regimen and made sure that she knows to call me if there are any excessive side effects.  We will monitor her closely every 2 weeks.  Right  tonsillar squamous cell carcinoma (HCC)       Under good control after concurrent cisplatin with radiation therapy.  We will call and check on the status of her speech therapy referral as well.      History of Present Illness   Chief Complaint   Patient presents with    Follow-up   Maira Moura is a 69 y.o. 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  She had a PET scan prior to this visit that shows a significant progression in the liver.  Her tonsil and neck disease is under good control and is showing what appears to be a near complete response.  There is only some residual inflammation after radiation.  She also has a stable low FDG avid adnexal mass.  She is still not able to take a lot of food by mouth because of soreness when she swallows.  She chokes on thin liquids sometimes.  Referral was made for speech therapy but she never got a call.  She has lost 4 pounds since I last saw her.  Anytime she takes any tube feeds or anything through her feeding tube she has diarrhea.  Sometimes she does not make it to the bathroom in time.  She is not taking any Imodium only Pepcid.       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, unresectable     7/31/2023 - FOLFOX     11/20/2023 - opdivo added to FOLFOX     12/18/2023-cycle 11-20% dose reduction of 5-FU bolus and oxaliplatin due to increased fatigue     Jan 2024 - oxaliplatin removed from treatment plan due to neuropathy - PET scan shows good disease control in all areas except colon lesion     3/2024 - right hemicolectomy - pT3N0     6/3/24 - cryoablation to liver (no interruption to systemic therapy)     7/30/2024 - stop folfox    "  9/2024 - 10/2024 - cis/RT to tonsils and cervical Lns    12/2024 - disease progression of colon cancer in the liver     Malignant neoplasm of right female breast (HCC)   11/23/2018 Initial Diagnosis    Malignant neoplasm of right female breast (HCC)     11/23/2018 Biopsy    Rt Breast US BX 1100 8cmfn, 4 passes 12g Marquee:  - Invasive breast carcinoma of no special type (ductal NST/invasive ductal carcinoma).  - grade 2  - %  - FL 95%  - HER2 negative     12/20/2018 Surgery    Right partial mastectomy and SLN biopsy:  Infiltrating ductal carcinoma, Grade 2/3, felt to be between 2.0 cm and 2.5 cm in greatest dimension  - No invasive tumor is seen at inked margins, but there is a focus of DCIS seen within approximately 1mm of the inked medial margin  - no LVI  - no LCIS  - 0/2 LN's  at least Stage IIA - pT2, pN0, cM0, G2.    - Dr Coronado     12/20/2018 -  Cancer Staged    Stage IIA - pT2, pN0, cM0, G2.       1/4/2019 Genomic Testing    Oncotype DX score: 13     2/1/2019 -  Hormone Therapy    anastrozole 1 mg daily as adjuvant endocrine therapy    - Dr Daley       2/14/2019 - 4/3/2019 Radiation    Course: C1    Plan ID Energy Fractions Dose per Fraction (cGy) Dose Correction (cGy) Total Dose Delivered (cGy) Elapsed Days   R Breast 10X/6X 25 / 25 200 0 5,000 40   R BRST BOOST 16E 5 / 5 200 0 1,000 7      Treatment dates:  C1: 2/14/2019 - 4/3/2019       Metastatic colon cancer to liver (HCC)   7/6/2023 Genetic Testing    CARIS Results:   KRAS Pathogenic Variant Exon 2  p.G12D : lack of benefit of cetuximab, panitumumab Level 2   No other actionable mutation; MSI stable; TMB low   MiFOLOXAi Results: \"Decreased Benefit of FOLFOX + Bevacizumab in first line metastatic CRC.  This patient may achieve improved results by receiving an alternative to FOLFOX, such as FOLFIRI, as their initial regimen. As an adjustment to frontline FOLFOXIRI following toxicity: This patient may achieve improved results by removing the " "oxaliplatin portion of their regimen\".         7/11/2023 Initial Diagnosis    Metastatic colon cancer to liver (HCC)     7/13/2023 -  Cancer Staged    Staging form: Colon and Rectum, AJCC 8th Edition  - Clinical stage from 7/13/2023: cT3, cN0, pM1 - Signed by Harika Medina DO on 9/28/2023  Total positive nodes: 0       7/31/2023 - 8/1/2024 Chemotherapy    cyanocobalamin, 1,000 mcg, Intramuscular, Every 30 days, 7 of 9 cycles  Administration: 1,000 mcg (5/9/2024), 1,000 mcg (5/24/2024), 1,000 mcg (6/20/2024), 1,000 mcg (7/3/2024), 1,000 mcg (7/18/2024), 1,000 mcg (8/1/2024)  potassium chloride, 20 mEq, Intravenous, Once, 2 of 2 cycles  Administration: 20 mEq (11/6/2023), 20 mEq (11/20/2023)  alteplase (CATHFLO), 2 mg, Intracatheter, Every 1 Minute as needed, 21 of 23 cycles  Administration: 2 mg (1/3/2024)  pegfilgrastim (NEULASTA), 6 mg, Subcutaneous, Once, 12 of 12 cycles  Administration: 6 mg (10/25/2023), 6 mg (11/8/2023), 6 mg (11/22/2023), 6 mg (12/6/2023), 6 mg (12/20/2023), 6 mg (1/12/2024), 6 mg (1/25/2024), 6 mg (4/25/2024), 6 mg (5/9/2024), 6 mg (5/24/2024), 6 mg (6/20/2024)  fluorouracil (ADRUCIL), 895 mg, Intravenous, Once, 21 of 23 cycles  Dose modification: 320 mg/m2 (original dose 400 mg/m2, Cycle 11)  Administration: 900 mg (7/31/2023), 900 mg (8/14/2023), 900 mg (8/28/2023), 900 mg (9/11/2023), 900 mg (9/25/2023), 900 mg (10/9/2023), 900 mg (10/23/2023), 895 mg (11/6/2023), 895 mg (11/20/2023), 895 mg (12/4/2023), 700 mg (12/18/2023), 680 mg (1/10/2024), 680 mg (1/23/2024), 625 mg (4/23/2024), 625 mg (5/7/2024), 625 mg (5/22/2024), 625 mg (6/4/2024), 625 mg (6/18/2024), 625 mg (7/1/2024), 625 mg (7/16/2024), 625 mg (7/30/2024)  nivolumab (OPDIVO) IVPB, 240 mg (100 % of original dose 240 mg), Intravenous, Once, 13 of 15 cycles  Dose modification: 240 mg (original dose 240 mg, Cycle 9)  Administration: 240 mg (11/20/2023), 240 mg (12/4/2023), 240 mg (12/18/2023), 240 mg (1/10/2024), 240 mg " (1/23/2024), 240 mg (4/23/2024), 240 mg (5/7/2024), 240 mg (5/22/2024), 240 mg (6/4/2024), 240 mg (6/18/2024), 240 mg (7/1/2024), 240 mg (7/16/2024), 240 mg (7/30/2024)  leucovorin calcium IVPB, 896 mg, Intravenous, Once, 21 of 23 cycles  Administration: 900 mg (7/31/2023), 900 mg (8/14/2023), 900 mg (8/28/2023), 900 mg (9/11/2023), 900 mg (9/25/2023), 900 mg (10/9/2023), 900 mg (10/23/2023), 900 mg (11/6/2023), 900 mg (11/20/2023), 900 mg (12/4/2023), 900 mg (12/18/2023), 850 mg (1/10/2024), 850 mg (1/23/2024), 800 mg (4/23/2024), 800 mg (5/7/2024), 800 mg (5/22/2024), 800 mg (6/4/2024), 800 mg (6/18/2024), 800 mg (7/1/2024), 800 mg (7/16/2024), 800 mg (7/30/2024)  oxaliplatin (ELOXATIN) chemo infusion, 85 mg/m2 = 190.4 mg, Intravenous, Once, 12 of 12 cycles  Dose modification: 68 mg/m2 (original dose 85 mg/m2, Cycle 11, Reason: Other (Must fill in a comment), Comment: increased fatigue)  Administration: 190.4 mg (7/31/2023), 190.4 mg (8/14/2023), 200 mg (8/28/2023), 200 mg (9/11/2023), 200 mg (9/25/2023), 200 mg (10/9/2023), 200 mg (10/23/2023), 200 mg (11/6/2023), 200 mg (11/20/2023), 190.4 mg (12/4/2023), 150 mg (12/18/2023), 150 mg (1/10/2024)  fluorouracil (ADRUCIL) ambulatory infusion Soln, 1,200 mg/m2/day = 5,375 mg, Intravenous, Over 46 hours, 21 of 23 cycles     9/26/2023 -  Cancer Staged    Staging form: Colon and Rectum, AJCC 8th Edition  - Pathologic: pT3, pN0, cM1 - Signed by Vickie Israel MD on 4/3/2024  Total positive nodes: 0       3/12/2024 Surgery    A. Terminal ileum, appendix, right colon (right hemicolectomy):  - Adenocarcinoma (5.2cm)  - Forty-nine (49) lymph nodes negative for carcinoma (0/49)    - Acellular mucin present in one (1) lymph node   - See staging synoptic (ypT3N0)     12/31/2024 -  Chemotherapy    alteplase (CATHFLO), 2 mg, Intracatheter, Every 1 Minute as needed, 0 of 6 cycles  fluorouracil (ADRUCIL), 400 mg/m2 = 770 mg, Intravenous, Once, 0 of 6 cycles  irinotecan  (CAMPTOSAR) chemo infusion, 90 mg/m2 = 173 mg (50 % of original dose 180 mg/m2), Intravenous, Once, 0 of 6 cycles  Dose modification: 90 mg/m2 (original dose 180 mg/m2, Cycle 1, Reason: Anticipated Tolerance, Comment: 50% dose reduction due to pre-existing diarrhea)  leucovorin calcium IVPB, 400 mg/m2 = 768 mg, Intravenous, Once, 0 of 6 cycles  fluorouracil (ADRUCIL) ambulatory infusion Soln, 1,200 mg/m2/day = 4,610 mg, Intravenous, Over 46 hours, 0 of 6 cycles     Right tonsillar squamous cell carcinoma (HCC)   7/27/2023 Initial Diagnosis    Right tonsillar squamous cell carcinoma (HCC)     7/27/2023 -  Cancer Staged    Staging form: Pharynx - Oropharynx, AJCC 8th Edition  - Clinical stage from 7/27/2023: Stage III (cT1, cN3, cM0, p16+) - Signed by Evan Plummer MD on 7/27/2023  Stage prefix: Initial diagnosis       9/16/2024 - 11/13/2024 Radiation      Plan ID Energy Fractions Dose per Fraction (cGy) Dose Correction (cGy) Total Dose Delivered (cGy) Elapsed Days   Head_Neck 6X-FFF 35 / 35 200 0 7,000 58    C2: 9/16/2024 - 11/13/2024 9/17/2024 - 10/21/2024 Chemotherapy    alteplase (CATHFLO), 2 mg, Intracatheter, Every 1 Minute as needed, 6 of 6 cycles  Administration: 2 mg (10/21/2024)  CISplatin (PLATINOL) infusion, 70 mg (original dose 40 mg/m2), Intravenous, Once, 6 of 6 cycles  Dose modification: 70 mg (original dose 40 mg/m2, Cycle 1, Reason: Anticipated Tolerance), 30 mg/m2 (original dose 40 mg/m2, Cycle 4, Reason: Neuropathy, Comment: dose reduction to 30 mg/m2 due to neuropathy)  Administration: 70 mg (9/17/2024), 70 mg (9/23/2024), 70 mg (9/30/2024), 59.1 mg (10/7/2024), 59.1 mg (10/14/2024), 59.1 mg (10/21/2024)  sodium chloride, 500 mL, Intravenous, Once, 6 of 6 cycles  Administration: 500 mL (9/17/2024), 500 mL (9/17/2024), 500 mL (9/23/2024), 500 mL (9/23/2024), 500 mL (9/30/2024), 500 mL (9/30/2024), 500 mL (10/7/2024), 500 mL (10/7/2024), 500 mL (10/14/2024), 500 mL (10/14/2024), 500  "mL (10/21/2024)  fosaprepitant (EMEND) IVPB, 150 mg, Intravenous, Once, 6 of 6 cycles  Administration: 150 mg (9/17/2024), 150 mg (9/23/2024), 150 mg (9/30/2024), 150 mg (10/7/2024), 150 mg (10/14/2024), 150 mg (10/21/2024)  IVPB builder, , Intravenous, Once, 1 of 1 cycle  Administration: Unknown dose (10/21/2024)        Review of Systems otherwise neg        Objective   /78 (BP Location: Left arm, Patient Position: Sitting, Cuff Size: Adult)   Pulse (!) 110   Temp (!) 97.4 °F (36.3 °C) (Temporal)   Resp 16   Ht 5' 6\" (1.676 m)   Wt 82.6 kg (182 lb)   SpO2 99%   BMI 29.38 kg/m²     ECOG   2  Physical Exam    GEN: Alert, awake oriented x3, in no acute distress  HEENT- No pallor, icterus, cyanosis, no oral mucosal lesions,   LAD - no palpable cervical, clavicle, axillary, inguinal LAD  Heart- normal S1 S2, regular rate and rhythm, No murmur, rubs.   Lungs- clear breathing sound bilateral.   Abdomen- soft, Non tender, bowel sounds present, feeding tube in place  Extremities- No cyanosis, clubbing, edema  Neuro- No focal neurological deficit      Labs: I have reviewed the following labs:  Lab Results   Component Value Date/Time    WBC 5.47 12/30/2024 11:19 AM    RBC 2.71 (L) 12/30/2024 11:19 AM    Hemoglobin 8.4 (L) 12/30/2024 11:19 AM    Hematocrit 25.9 (L) 12/30/2024 11:19 AM    MCV 96 12/30/2024 11:19 AM    MCH 31.0 12/30/2024 11:19 AM    RDW 17.3 (H) 12/30/2024 11:19 AM    Platelets 312 12/30/2024 11:19 AM    Segmented % 75 12/30/2024 11:19 AM    Lymphocytes % 12 (L) 12/30/2024 11:19 AM    Monocytes % 10 12/30/2024 11:19 AM    Eosinophils Relative 3 12/30/2024 11:19 AM    Basophils Relative 0 12/30/2024 11:19 AM    Immature Grans % 0 12/30/2024 11:19 AM    Absolute Neutrophils 4.06 12/30/2024 11:19 AM     Lab Results   Component Value Date/Time    Potassium 3.7 12/30/2024 11:19 AM    Chloride 104 12/30/2024 11:19 AM    CO2 27 12/30/2024 11:19 AM    BUN 16 12/30/2024 11:19 AM    Creatinine 1.25 " 12/30/2024 11:19 AM    Glucose, Fasting 102 (H) 10/30/2024 04:34 AM    Calcium 9.6 12/30/2024 11:19 AM    Corrected Calcium 9.7 12/26/2024 12:29 PM    AST 32 12/30/2024 11:19 AM    ALT 8 12/30/2024 11:19 AM    Alkaline Phosphatase 82 12/30/2024 11:19 AM    Total Protein 7.8 12/30/2024 11:19 AM    Albumin 3.6 12/30/2024 11:19 AM    Total Bilirubin 0.53 12/30/2024 11:19 AM    eGFR 44 12/30/2024 11:19 AM         Radiology Results Review: I have reviewed radiology reports from prior to this visit including: PET scan.

## 2024-12-31 NOTE — ASSESSMENT & PLAN NOTE
Under good control after concurrent cisplatin with radiation therapy.  We will call and check on the status of her speech therapy referral as well.

## 2024-12-31 NOTE — ASSESSMENT & PLAN NOTE
Orders:    Infusion Calculated Appointment Request; Future    CBC and differential; Future    Comprehensive metabolic panel; Future    Infusion Calculated Appointment Request; Future    Patient is having clear progression of her colon cancer in the liver on the PET scan.  Unfortunately we are going to need to restart systemic chemotherapy.  Her liver lesion is rather large.  It is not causing any pain right now.  I cannot give her any more oxaliplatin due to the neuropathy after prior exposure of that medication and cisplatin.  I am going to offer her FOLFIRI with a 50% dose reduction of the irinotecan because of her pre-existing diarrhea.  I have given her cholestyramine to take 3 times a day.  I also wrote down for her to get Imodium over-the-counter and to take 2 pills when she has an episode of diarrhea and then 1 pill as needed throughout the rest of the day for repeat episodes.  She is getting hydration 3 times a week which we will continue.  I will see her after her first cycle of FOLFIRI to see how she tolerates it.  Risks and benefits of this regimen were discussed and consent was signed.  She does want to continue with active therapy.  Normally I would add Avastin to this but the patient would like to hold off on adding any more drugs at this time to see how she tolerates this regimen.  I stressed the potential for some very real toxicity from this regimen and made sure that she knows to call me if there are any excessive side effects.  We will monitor her closely every 2 weeks.

## 2025-01-02 ENCOUNTER — DOCUMENTATION (OUTPATIENT)
Dept: HEMATOLOGY ONCOLOGY | Facility: CLINIC | Age: 70
End: 2025-01-02

## 2025-01-02 ENCOUNTER — HOSPITAL ENCOUNTER (OUTPATIENT)
Dept: INFUSION CENTER | Facility: CLINIC | Age: 70
Discharge: HOME | End: 2025-01-02
Payer: MEDICARE

## 2025-01-02 ENCOUNTER — TELEPHONE (OUTPATIENT)
Dept: HEMATOLOGY ONCOLOGY | Facility: CLINIC | Age: 70
End: 2025-01-02

## 2025-01-02 DIAGNOSIS — C50.411 MALIGNANT NEOPLASM OF UPPER-OUTER QUADRANT OF RIGHT BREAST IN FEMALE, ESTROGEN RECEPTOR POSITIVE (HCC): Primary | ICD-10-CM

## 2025-01-02 DIAGNOSIS — Z17.0 MALIGNANT NEOPLASM OF UPPER-OUTER QUADRANT OF RIGHT BREAST IN FEMALE, ESTROGEN RECEPTOR POSITIVE (HCC): Primary | ICD-10-CM

## 2025-01-02 PROCEDURE — 96365 THER/PROPH/DIAG IV INF INIT: CPT

## 2025-01-02 RX ADMIN — MAGNESIUM SULFATE HEPTAHYDRATE: 500 INJECTION, SOLUTION INTRAMUSCULAR; INTRAVENOUS at 14:48

## 2025-01-02 RX ADMIN — SODIUM CHLORIDE 1000 ML: 9 INJECTION, SOLUTION INTRAVENOUS at 14:28

## 2025-01-02 NOTE — PROGRESS NOTES
Pt presents for magnesium and NSS hydration. No new meds or concerns. Pt tolerated treatment without adverse reaction. Future appointments discussed, confirmed with patient for 1/3/25 1070. AVS declined.

## 2025-01-02 NOTE — PROGRESS NOTES
In-basket message received from Dr. Medina to remove patient from the lower GI [facility] on 1/9/2025.

## 2025-01-03 ENCOUNTER — HOSPITAL ENCOUNTER (OUTPATIENT)
Dept: INFUSION CENTER | Facility: CLINIC | Age: 70
End: 2025-01-03
Payer: MEDICARE

## 2025-01-03 DIAGNOSIS — Z17.0 MALIGNANT NEOPLASM OF UPPER-OUTER QUADRANT OF RIGHT BREAST IN FEMALE, ESTROGEN RECEPTOR POSITIVE (HCC): ICD-10-CM

## 2025-01-03 DIAGNOSIS — C78.7 METASTATIC COLON CANCER TO LIVER (HCC): Primary | ICD-10-CM

## 2025-01-03 DIAGNOSIS — C18.9 METASTATIC COLON CANCER TO LIVER (HCC): Primary | ICD-10-CM

## 2025-01-03 DIAGNOSIS — C50.411 MALIGNANT NEOPLASM OF UPPER-OUTER QUADRANT OF RIGHT BREAST IN FEMALE, ESTROGEN RECEPTOR POSITIVE (HCC): ICD-10-CM

## 2025-01-03 LAB
ALBUMIN SERPL BCG-MCNC: 3.4 G/DL (ref 3.5–5)
ALP SERPL-CCNC: 84 U/L (ref 34–104)
ALT SERPL W P-5'-P-CCNC: 8 U/L (ref 7–52)
ANION GAP SERPL CALCULATED.3IONS-SCNC: 7 MMOL/L (ref 4–13)
AST SERPL W P-5'-P-CCNC: 32 U/L (ref 13–39)
BASOPHILS # BLD AUTO: 0.03 THOUSANDS/ΜL (ref 0–0.1)
BASOPHILS NFR BLD AUTO: 1 % (ref 0–1)
BILIRUB SERPL-MCNC: 0.5 MG/DL (ref 0.2–1)
BUN SERPL-MCNC: 14 MG/DL (ref 5–25)
CALCIUM ALBUM COR SERPL-MCNC: 10.1 MG/DL (ref 8.3–10.1)
CALCIUM SERPL-MCNC: 9.6 MG/DL (ref 8.4–10.2)
CHLORIDE SERPL-SCNC: 105 MMOL/L (ref 96–108)
CO2 SERPL-SCNC: 28 MMOL/L (ref 21–32)
CREAT SERPL-MCNC: 0.99 MG/DL (ref 0.6–1.3)
EOSINOPHIL # BLD AUTO: 0.12 THOUSAND/ΜL (ref 0–0.61)
EOSINOPHIL NFR BLD AUTO: 2 % (ref 0–6)
ERYTHROCYTE [DISTWIDTH] IN BLOOD BY AUTOMATED COUNT: 16.5 % (ref 11.6–15.1)
GFR SERPL CREATININE-BSD FRML MDRD: 58 ML/MIN/1.73SQ M
GLUCOSE SERPL-MCNC: 107 MG/DL (ref 65–140)
HCT VFR BLD AUTO: 25.1 % (ref 34.8–46.1)
HGB BLD-MCNC: 8.4 G/DL (ref 11.5–15.4)
IMM GRANULOCYTES # BLD AUTO: 0.01 THOUSAND/UL (ref 0–0.2)
IMM GRANULOCYTES NFR BLD AUTO: 0 % (ref 0–2)
LYMPHOCYTES # BLD AUTO: 0.54 THOUSANDS/ΜL (ref 0.6–4.47)
LYMPHOCYTES NFR BLD AUTO: 10 % (ref 14–44)
MAGNESIUM SERPL-MCNC: 1.8 MG/DL (ref 1.9–2.7)
MCH RBC QN AUTO: 31.2 PG (ref 26.8–34.3)
MCHC RBC AUTO-ENTMCNC: 33.5 G/DL (ref 31.4–37.4)
MCV RBC AUTO: 93 FL (ref 82–98)
MONOCYTES # BLD AUTO: 0.44 THOUSAND/ΜL (ref 0.17–1.22)
MONOCYTES NFR BLD AUTO: 8 % (ref 4–12)
NEUTROPHILS # BLD AUTO: 4.17 THOUSANDS/ΜL (ref 1.85–7.62)
NEUTS SEG NFR BLD AUTO: 79 % (ref 43–75)
NRBC BLD AUTO-RTO: 0 /100 WBCS
PLATELET # BLD AUTO: 294 THOUSANDS/UL (ref 149–390)
PMV BLD AUTO: 10.7 FL (ref 8.9–12.7)
POTASSIUM SERPL-SCNC: 3.4 MMOL/L (ref 3.5–5.3)
PROT SERPL-MCNC: 7.5 G/DL (ref 6.4–8.4)
RBC # BLD AUTO: 2.69 MILLION/UL (ref 3.81–5.12)
SODIUM SERPL-SCNC: 140 MMOL/L (ref 135–147)
WBC # BLD AUTO: 5.31 THOUSAND/UL (ref 4.31–10.16)

## 2025-01-03 PROCEDURE — 83735 ASSAY OF MAGNESIUM: CPT | Performed by: INTERNAL MEDICINE

## 2025-01-03 PROCEDURE — 85025 COMPLETE CBC W/AUTO DIFF WBC: CPT | Performed by: INTERNAL MEDICINE

## 2025-01-03 PROCEDURE — 80053 COMPREHEN METABOLIC PANEL: CPT | Performed by: INTERNAL MEDICINE

## 2025-01-03 NOTE — PROGRESS NOTES
Pt arrived for her hydration and lab work. Noted that pt received 2 liters yesterday. MD's office notified - order received for labs only today and skip Hydration till Monday 1/6/25 as per Hanane Diehl RN. Hydration is to continue 3xweek during Chemo treatment. Awaiting response if pt is to receive Hydration on Chemo days.   Pt made aware of above. Calendar printed and handed to patient with all upcoming visits.    Port accessed without incident. Labs drawn via patent PORT then de-accessed intact.    Pt is aware to return on Monday 1/6/25 at 11:30.    Was discharged in stable condition with all belongings

## 2025-01-06 ENCOUNTER — EVALUATION (OUTPATIENT)
Dept: SPEECH THERAPY | Facility: CLINIC | Age: 70
End: 2025-01-06
Payer: MEDICARE

## 2025-01-06 ENCOUNTER — PATIENT OUTREACH (OUTPATIENT)
Dept: HEMATOLOGY ONCOLOGY | Facility: CLINIC | Age: 70
End: 2025-01-06

## 2025-01-06 ENCOUNTER — HOSPITAL ENCOUNTER (OUTPATIENT)
Dept: INFUSION CENTER | Facility: CLINIC | Age: 70
Discharge: HOME | End: 2025-01-06
Payer: MEDICARE

## 2025-01-06 VITALS
RESPIRATION RATE: 18 BRPM | SYSTOLIC BLOOD PRESSURE: 125 MMHG | DIASTOLIC BLOOD PRESSURE: 82 MMHG | TEMPERATURE: 98 F | HEART RATE: 108 BPM

## 2025-01-06 DIAGNOSIS — R13.12 DYSPHAGIA, OROPHARYNGEAL PHASE: Primary | ICD-10-CM

## 2025-01-06 DIAGNOSIS — Z90.89 STATUS POST TONSILLECTOMY: ICD-10-CM

## 2025-01-06 DIAGNOSIS — C09.9 RIGHT TONSILLAR SQUAMOUS CELL CARCINOMA (HCC): ICD-10-CM

## 2025-01-06 DIAGNOSIS — Z17.0 MALIGNANT NEOPLASM OF UPPER-OUTER QUADRANT OF RIGHT BREAST IN FEMALE, ESTROGEN RECEPTOR POSITIVE (HCC): Primary | ICD-10-CM

## 2025-01-06 DIAGNOSIS — R53.83 FATIGUE, UNSPECIFIED TYPE: ICD-10-CM

## 2025-01-06 DIAGNOSIS — C50.411 MALIGNANT NEOPLASM OF UPPER-OUTER QUADRANT OF RIGHT BREAST IN FEMALE, ESTROGEN RECEPTOR POSITIVE (HCC): Primary | ICD-10-CM

## 2025-01-06 LAB
ALBUMIN SERPL BCG-MCNC: 3.7 G/DL (ref 3.5–5)
ALP SERPL-CCNC: 77 U/L (ref 34–104)
ALT SERPL W P-5'-P-CCNC: 8 U/L (ref 7–52)
ANION GAP SERPL CALCULATED.3IONS-SCNC: 8 MMOL/L (ref 4–13)
AST SERPL W P-5'-P-CCNC: 35 U/L (ref 13–39)
BASOPHILS # BLD AUTO: 0.03 THOUSANDS/ΜL (ref 0–0.1)
BASOPHILS NFR BLD AUTO: 1 % (ref 0–1)
BILIRUB SERPL-MCNC: 0.57 MG/DL (ref 0.2–1)
BUN SERPL-MCNC: 18 MG/DL (ref 5–25)
CALCIUM SERPL-MCNC: 9.7 MG/DL (ref 8.4–10.2)
CHLORIDE SERPL-SCNC: 105 MMOL/L (ref 96–108)
CO2 SERPL-SCNC: 28 MMOL/L (ref 21–32)
CREAT SERPL-MCNC: 1.07 MG/DL (ref 0.6–1.3)
EOSINOPHIL # BLD AUTO: 0.13 THOUSAND/ΜL (ref 0–0.61)
EOSINOPHIL NFR BLD AUTO: 2 % (ref 0–6)
ERYTHROCYTE [DISTWIDTH] IN BLOOD BY AUTOMATED COUNT: 16.3 % (ref 11.6–15.1)
GFR SERPL CREATININE-BSD FRML MDRD: 53 ML/MIN/1.73SQ M
GLUCOSE SERPL-MCNC: 99 MG/DL (ref 65–140)
HCT VFR BLD AUTO: 26.4 % (ref 34.8–46.1)
HGB BLD-MCNC: 8.2 G/DL (ref 11.5–15.4)
IMM GRANULOCYTES # BLD AUTO: 0.01 THOUSAND/UL (ref 0–0.2)
IMM GRANULOCYTES NFR BLD AUTO: 0 % (ref 0–2)
LYMPHOCYTES # BLD AUTO: 0.62 THOUSANDS/ΜL (ref 0.6–4.47)
LYMPHOCYTES NFR BLD AUTO: 11 % (ref 14–44)
MAGNESIUM SERPL-MCNC: 1.7 MG/DL (ref 1.9–2.7)
MCH RBC QN AUTO: 30.3 PG (ref 26.8–34.3)
MCHC RBC AUTO-ENTMCNC: 31.1 G/DL (ref 31.4–37.4)
MCV RBC AUTO: 97 FL (ref 82–98)
MONOCYTES # BLD AUTO: 0.53 THOUSAND/ΜL (ref 0.17–1.22)
MONOCYTES NFR BLD AUTO: 9 % (ref 4–12)
NEUTROPHILS # BLD AUTO: 4.36 THOUSANDS/ΜL (ref 1.85–7.62)
NEUTS SEG NFR BLD AUTO: 77 % (ref 43–75)
NRBC BLD AUTO-RTO: 0 /100 WBCS
PLATELET # BLD AUTO: 310 THOUSANDS/UL (ref 149–390)
PMV BLD AUTO: 11 FL (ref 8.9–12.7)
POTASSIUM SERPL-SCNC: 3.4 MMOL/L (ref 3.5–5.3)
PROT SERPL-MCNC: 7.6 G/DL (ref 6.4–8.4)
RBC # BLD AUTO: 2.71 MILLION/UL (ref 3.81–5.12)
SODIUM SERPL-SCNC: 141 MMOL/L (ref 135–147)
WBC # BLD AUTO: 5.68 THOUSAND/UL (ref 4.31–10.16)

## 2025-01-06 PROCEDURE — 92610 EVALUATE SWALLOWING FUNCTION: CPT | Performed by: SPEECH-LANGUAGE PATHOLOGIST

## 2025-01-06 PROCEDURE — 85025 COMPLETE CBC W/AUTO DIFF WBC: CPT | Performed by: INTERNAL MEDICINE

## 2025-01-06 PROCEDURE — 92526 ORAL FUNCTION THERAPY: CPT | Performed by: SPEECH-LANGUAGE PATHOLOGIST

## 2025-01-06 PROCEDURE — 83735 ASSAY OF MAGNESIUM: CPT | Performed by: INTERNAL MEDICINE

## 2025-01-06 PROCEDURE — 96365 THER/PROPH/DIAG IV INF INIT: CPT

## 2025-01-06 PROCEDURE — 80053 COMPREHEN METABOLIC PANEL: CPT | Performed by: INTERNAL MEDICINE

## 2025-01-06 RX ORDER — POTASSIUM CHLORIDE 20MEQ/15ML
20 LIQUID (ML) ORAL 2 TIMES DAILY
Qty: 473 ML | Refills: 0 | Status: SHIPPED | OUTPATIENT
Start: 2025-01-06

## 2025-01-06 RX ADMIN — MAGNESIUM SULFATE HEPTAHYDRATE: 500 INJECTION, SOLUTION INTRAMUSCULAR; INTRAVENOUS at 12:19

## 2025-01-06 NOTE — PROGRESS NOTES
Patient presents for hydration today as well as labs. Port accessed and labs drawn per protocol. Patient tolerated hydration without incident. Patient declines AVS. Aware of next appointment on 1/8 @ 1030am.

## 2025-01-06 NOTE — PROGRESS NOTES
Pt called to confirm the address of her speech therapy appointment scheduled for today.  I stated she is scheduled for [place]  for both scheduled appointments today, including the speech therapy. She was not aware she had transportation to the speech therapy appointment.  She understood, and was thankful for the assistance

## 2025-01-06 NOTE — PROGRESS NOTES
Speech-Language Pathology Initial Evaluation    Today's date: 2025  Patient’s name: Maira Moura  : 1955  MRN: [id number]  Safety measures: HTN, CVA, GERD, active CA, s/p PEG tube placement, aspiration precautions  Current recommended diet: PEG tube for primary nutrition/hydration; puree with thin liquids P.O. as tolerated  Referring provider: Harika Medina DO Encounter Diagnosis     ICD-10-CM    1. Dysphagia, oropharyngeal phase  R13.12 FL barium swallow video w speech      2. Status post tonsillectomy  Z90.89 Ambulatory Referral to Speech Therapy     FL barium swallow video w speech      3. 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 Ambulatory Referral to Speech Therapy     FL barium swallow video w speech        Assessment:  Patient presents with at least mild oropharyngeal dysphagia. Based upon the trials administered today (puree and thin liquids), increased time was required for cohesive bolus formation and AP transfer of puree. Swallow initiation appeared to be prompt. No anterior loss of any food/liquid bolus. Reduced hyolaryngeal elevation and excursion were noted upon palpation. Patient with c/o mild globus sensation on both consistencies. Patient utilized multiple swallows, which she reported helped to clear the globus sensation on thin liquid. Patient utilized liquid wash and multiple swallows, which she reported helped to clear the globus sensation on puree. Vocal quality remained at baseline status throughout assessment. No overt s/sx of penetration/aspiration noted on this date of service. Odynophagia is a significant limiting factor with P.O. intake, as well as xerostomia, dysgeusia, and reduced appetite. Patient is s/p PEG tube placement and is taking, at most, taking 6 cans of TF formula/day per report. Patient noted that it upsets her stomach. Patient indicated that she is losing weight. Patient would benefit from a follow-up with Nutrition to discuss. Also, to  note, a white coating was visualized near patient's base of tongue (?thrush). Recommend that patient follow-up with her physician for further evaluation. Patient would benefit from outpatient skilled Speech Therapy services for further education/training on safe swallowing strategies & aspiration precautions, for continued assessment of patient's tolerance of the safest, least restrictive diet, for therapeutic exercises, to facilitate overall improved quality of life, and for instruction on HEP. It is recommended that patient receive a videofluoroscopic swallow study (VFSS) to assess her current swallowing function to r/o penetration or aspiration and to determine the safest, least restrictive diet.      -Safety concerns: Risk for aspiration, Risk for choking, and Risk for inadequate nutrition/ hydration    -Risk factors: History of Head and Neck Cancer, Complex medical history, and GERD      SWALLOWING SAFETY PRECAUTIONS:  PEG tube for primary nutrition/hydration    -Recommended solids: Puree    -Recommended liquids: Thin    -Recommended medication form: Crushed with puree or or via PEG (*consult with physician to discuss if medication form can be altered)    -Frequent/thorough oral care    -Aspiration precautions  *Monitor for signs/symptoms concerning for aspiration (e.g., low grade fever, increase in WBC, change in chest x-ray, increased congestion, increased coughing with P.O. intake)    -Reflux precautions    -Strategies: Small sips and bites when eating, Slow rate, swallow between bites, and Alternate liquids and solids    -Positioning: Upright position during meals and Upright position at least 30 minutes after P.O. intake    -Compensatory strategies: Effortful swallow and Multiple swallows    -Supervision: Independent     -Referrals: Videofluroscopic Swallow Study and Nutritionist (Patient stated that her RD is out on maternity leave. Patient reported that she plans on scheduling an appointment with  Nutrition the next time she visits the office for an infusion.)      Short-term goals:  Patient will receive a videofluoroscopic swallow study (VFSS) to assess her current swallowing function to r/o penetration or aspiration, to determine the safest, least restrictive diet, and to recommended the appropriate rehabilitation exercises (to be achieved in 2-3 weeks).     Patient will be educated on safe swallowing compensatory strategies (e.g., upright posture, small bites/sips, slow rate, alternation of consistencies, multiple swallows, effortful swallow, etc.) to facilitate increased safety with P.O. intake (to be achieved in 4-6 weeks).     Patient will tolerate P.O. trials of her recommended diet with the implementation of safe swallowing strategies without overt s/sx of penetration and/or aspiration during skilled therapy sessions in this certification period (to be achieved in 4-6 weeks).     Patient will perform oral motor exercises using IOPI device on lingual muscles to facilitate increased function and strength by 25% for improved swallowing function (to be achieved in 6-8 weeks). *gather baseline data next session*    Patient will independently complete pharyngeal strengthening exercises (e.g., Effortful swallow, Mendelsohn, Evelia) daily to facilitate increased pharyngeal strength and coordination (to be achieved in 4-6 weeks).     Long-term goals:  Patient will maintain adequate nutrition and hydration with optimum safety and efficacy of swallowing function on P.O. intake without overt s/sx of penetration or aspiration (to be achieved by discharge).     Patient will independently utilize compensatory strategies with optimum safety and efficacy of swallowing function on P.O. intake without overt s/sx of penetration or aspiration (to be achieved by discharge).       Plan:  Patient would benefit from outpatient skilled Speech Therapy services: Dysphagia therapy (Due to patient's history of dysphagia, she may  "benefit from neuromuscular electrical stimulation (NMES) (i.e., VitalStim) treatment in conjunction with pharyngeal/laryngeal strengthening exercises and P.O. intake, unless otherwise contraindicated.)    Frequency: 2x weekly  Duration: 3 months    Intervention certification from: 1/6/2025  Intervention certification to: 04/06/2025      Subjective:  History of present illness: Patient is a 69 y.o. female who was referred to outpatient skilled Speech Therapy services for a dysphagia evaluation.     Per Radiation Oncology note (12/20/2024), patient \"...with history of right breast carcinoma status post lumpectomy and radiation therapy in 2019.  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  She had cryoablation to the liver on Evelin 3, 2024.  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  She has been receiving treatment with 5-FU leucovorin and Nivolumab.  PET/CT study August 5, 2024 shows progression within the neck as well as the tonsil.  She was discussed with head and neck multidisciplinary case review on August 12, 2024 with recommendation to see Dr. Chapa August 16, 2024 and to consider radiation therapy along with chemotherapy.  She had previously been seen by dentistry/oral surgery and had dental extractions on September 1, 2023.  She has had a full upper denture in the last 10 years.  She had extractions of mandibular teeth that were in poor repair with 2 incisors that are remaining.   She was seen for consultation on August 20, 2024 for definitive radiation therapy to the head and neck region over 7 weeks/35 fractions with total dose of 7000 cGy along with concurrent chemotherapy with Dr. Diaz.  She declined upfront PEG tube placement and then developed mucositis such that PEG tube had to be " "placed during treatment.  She did have a nasopharynx/oropharynx biopsy on October 9, 2024 along with right and left tonsillectomy that were all negative for malignancy. She completed definitive treatment on November 13, 2024...\"    Per Oncology note (12/31/2024), patient \"...with a diagnosis of metastatic colon cancer to the liver and 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  She had a PET scan prior to this visit that shows a significant progression in the liver.  Her tonsil and neck disease is under good control and is showing what appears to be a near complete response.  There is only some residual inflammation after radiation.  She also has a stable low FDG avid adnexal mass.  She is still not able to take a lot of food by mouth because of soreness when she swallows.  She chokes on thin liquids sometimes.  Referral was made for speech therapy but she never got a call.  She has lost 4 pounds since I last saw her.  Anytime she takes any tube feeds or anything through her feeding tube she has diarrhea.  Sometimes she does not make it to the bathroom in time.  She is not taking any Imodium only Pepcid...\"    Videofluoroscopic swallow study (VFSS) completed on 08/15/2023 revealed:  \"...Oral stage: WNL/WFL Mastication was slightly prolonged but functional.  Patient wears upper dentures and has minimal frontal lower natural dentition.  Bolus control and formation were WNL.  Transfer was intermittently piecemeal.  Mild lingual residue.     Pharyngeal stage: WNL for prompt swallow, velar closure, hyoid excursion, laryngeal elevation, pharyngeal constriction, epiglottic inversion.  No/trace pharyngeal retention.  No penetration or aspiration.     Per gross esophageal screen: Mild stasis following solids.  Thin liquids cleared residual.  Pill cleared adequately.     Recommendations:  Diet: Regular as tolerated  Liquids: Thin  Meds: As tolerated  Frequent oral care  Upright position  F/u ST tx: Not at this " "time.  Please reconsult if issues arise during or following treatment  Reflux Precautions.  History of GERD  Results reviewed with: pt  Repeat MBS as necessary  If a dedicated assessment of the esophagus is desired:  Consider esophagram/barium swallow w/ barium tablet administration   Consider EGD...\"    Clinical swallow examination complted on 11/02/2024 revealed:  \".Summary:   Pt presented with mild oropharyngeal dysphagia antonio by reduced transfers, labored at times and slow 2* possibly to pain, fairly prompt swallows and possibly reduced laryngeal elevation.  She has mild c/o pain in the throat w/ swallows but stated \"the water feels good\".  She feels able to begin a small amount of po at this time.  Would benefit from MBS in the future as able.   Risk/s for Aspiration: HNC/radiation     Recommended Diet: puree/level 1 diet and thin liquids -runny puree, extra sauce/gravy  Recommended Form of Meds: crushed with puree and or via PEG    Aspiration precautions and swallowing strategies: upright posture, slow rate of feeding, and alternating bites and sips  Other Recommendations: Continue frequent oral care, will follow...\"    Oncology history:  Oncology History Overview Note    2/2019 - pT2N0 breast cancer treated with anastrozole, oncotype 13, ER+ IA+ Her2 neg     7/2023 - metastatic ascending colon adenocarcinoma to liver     Caris - KRAS G12D, CAIN, PD-L1 0%     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, unresectable     7/31/2023 - FOLFOX     11/20/2023 - opdivo added to FOLFOX     12/18/2023-cycle 11-20% dose reduction of 5-FU bolus and oxaliplatin due to increased fatigue     Jan 2024 - oxaliplatin removed from treatment plan due to neuropathy - PET scan shows good disease control in all areas except colon lesion     3/2024 - right hemicolectomy - pT3N0     6/3/24 - cryoablation to liver (no interruption to systemic therapy)     7/30/2024 - stop folfox     9/2024 - 10/2024 - cis/RT to tonsils and " "cervical Lns     12/2024 - disease progression of colon cancer in the liver      Malignant neoplasm of right female breast (HCC)    11/23/2018 Initial Diagnosis      Malignant neoplasm of right female breast (HCC)       11/23/2018 Biopsy      Rt Breast US BX 1100 8cmfn, 4 passes 12g Marquee:  - Invasive breast carcinoma of no special type (ductal NST/invasive ductal carcinoma).  - grade 2  - %  - CT 95%  - HER2 negative       12/20/2018 Surgery      Right partial mastectomy and SLN biopsy:  Infiltrating ductal carcinoma, Grade 2/3, felt to be between 2.0 cm and 2.5 cm in greatest dimension  - No invasive tumor is seen at inked margins, but there is a focus of DCIS seen within approximately 1mm of the inked medial margin  - no LVI  - no LCIS  - 0/2 LN's  at least Stage IIA - pT2, pN0, cM0, G2.     - Dr Coronado       12/20/2018 -  Cancer Staged      Stage IIA - pT2, pN0, cM0, G2.          1/4/2019 Genomic Testing      Oncotype DX score: 13       2/1/2019 -  Hormone Therapy      anastrozole 1 mg daily as adjuvant endocrine therapy     - Dr Daley          2/14/2019 - 4/3/2019 Radiation      Course: C1     Plan ID Energy Fractions Dose per Fraction (cGy) Dose Correction (cGy) Total Dose Delivered (cGy) Elapsed Days   R Breast 10X/6X 25 / 25 200 0 5,000 40   R BRST BOOST 16E 5 / 5 200 0 1,000 7      Treatment dates:  C1: 2/14/2019 - 4/3/2019          Metastatic colon cancer to liver (HCC)    7/6/2023 Genetic Testing      CARIS Results:   KRAS Pathogenic Variant Exon 2  p.G12D : lack of benefit of cetuximab, panitumumab Level 2   No other actionable mutation; MSI stable; TMB low   MiFOLOXAi Results: \"Decreased Benefit of FOLFOX + Bevacizumab in first line metastatic CRC.  This patient may achieve improved results by receiving an alternative to FOLFOX, such as FOLFIRI, as their initial regimen. As an adjustment to frontline FOLFOXIRI following toxicity: This patient may achieve improved results by removing the " "oxaliplatin portion of their regimen\".             7/11/2023 Initial Diagnosis      Metastatic colon cancer to liver (HCC)       7/13/2023 -  Cancer Staged      Staging form: Colon and Rectum, AJCC 8th Edition  - Clinical stage from 7/13/2023: cT3, cN0, pM1 - Signed by Harika Medina DO on 9/28/2023  Total positive nodes: 0          7/31/2023 - 8/1/2024 Chemotherapy      cyanocobalamin, 1,000 mcg, Intramuscular, Every 30 days, 7 of 9 cycles  Administration: 1,000 mcg (5/9/2024), 1,000 mcg (5/24/2024), 1,000 mcg (6/20/2024), 1,000 mcg (7/3/2024), 1,000 mcg (7/18/2024), 1,000 mcg (8/1/2024)  potassium chloride, 20 mEq, Intravenous, Once, 2 of 2 cycles  Administration: 20 mEq (11/6/2023), 20 mEq (11/20/2023)  alteplase (CATHFLO), 2 mg, Intracatheter, Every 1 Minute as needed, 21 of 23 cycles  Administration: 2 mg (1/3/2024)  pegfilgrastim (NEULASTA), 6 mg, Subcutaneous, Once, 12 of 12 cycles  Administration: 6 mg (10/25/2023), 6 mg (11/8/2023), 6 mg (11/22/2023), 6 mg (12/6/2023), 6 mg (12/20/2023), 6 mg (1/12/2024), 6 mg (1/25/2024), 6 mg (4/25/2024), 6 mg (5/9/2024), 6 mg (5/24/2024), 6 mg (6/20/2024)  fluorouracil (ADRUCIL), 895 mg, Intravenous, Once, 21 of 23 cycles  Dose modification: 320 mg/m2 (original dose 400 mg/m2, Cycle 11)  Administration: 900 mg (7/31/2023), 900 mg (8/14/2023), 900 mg (8/28/2023), 900 mg (9/11/2023), 900 mg (9/25/2023), 900 mg (10/9/2023), 900 mg (10/23/2023), 895 mg (11/6/2023), 895 mg (11/20/2023), 895 mg (12/4/2023), 700 mg (12/18/2023), 680 mg (1/10/2024), 680 mg (1/23/2024), 625 mg (4/23/2024), 625 mg (5/7/2024), 625 mg (5/22/2024), 625 mg (6/4/2024), 625 mg (6/18/2024), 625 mg (7/1/2024), 625 mg (7/16/2024), 625 mg (7/30/2024)  nivolumab (OPDIVO) IVPB, 240 mg (100 % of original dose 240 mg), Intravenous, Once, 13 of 15 cycles  Dose modification: 240 mg (original dose 240 mg, Cycle 9)  Administration: 240 mg (11/20/2023), 240 mg (12/4/2023), 240 mg (12/18/2023), 240 mg (1/10/2024), " 240 mg (1/23/2024), 240 mg (4/23/2024), 240 mg (5/7/2024), 240 mg (5/22/2024), 240 mg (6/4/2024), 240 mg (6/18/2024), 240 mg (7/1/2024), 240 mg (7/16/2024), 240 mg (7/30/2024)  leucovorin calcium IVPB, 896 mg, Intravenous, Once, 21 of 23 cycles  Administration: 900 mg (7/31/2023), 900 mg (8/14/2023), 900 mg (8/28/2023), 900 mg (9/11/2023), 900 mg (9/25/2023), 900 mg (10/9/2023), 900 mg (10/23/2023), 900 mg (11/6/2023), 900 mg (11/20/2023), 900 mg (12/4/2023), 900 mg (12/18/2023), 850 mg (1/10/2024), 850 mg (1/23/2024), 800 mg (4/23/2024), 800 mg (5/7/2024), 800 mg (5/22/2024), 800 mg (6/4/2024), 800 mg (6/18/2024), 800 mg (7/1/2024), 800 mg (7/16/2024), 800 mg (7/30/2024)  oxaliplatin (ELOXATIN) chemo infusion, 85 mg/m2 = 190.4 mg, Intravenous, Once, 12 of 12 cycles  Dose modification: 68 mg/m2 (original dose 85 mg/m2, Cycle 11, Reason: Other (Must fill in a comment), Comment: increased fatigue)  Administration: 190.4 mg (7/31/2023), 190.4 mg (8/14/2023), 200 mg (8/28/2023), 200 mg (9/11/2023), 200 mg (9/25/2023), 200 mg (10/9/2023), 200 mg (10/23/2023), 200 mg (11/6/2023), 200 mg (11/20/2023), 190.4 mg (12/4/2023), 150 mg (12/18/2023), 150 mg (1/10/2024)  fluorouracil (ADRUCIL) ambulatory infusion Soln, 1,200 mg/m2/day = 5,375 mg, Intravenous, Over 46 hours, 21 of 23 cycles       9/26/2023 -  Cancer Staged      Staging form: Colon and Rectum, AJCC 8th Edition  - Pathologic: pT3, pN0, cM1 - Signed by Vickie Israel MD on 4/3/2024  Total positive nodes: 0          3/12/2024 Surgery      A. Terminal ileum, appendix, right colon (right hemicolectomy):  - Adenocarcinoma (5.2cm)  - Forty-nine (49) lymph nodes negative for carcinoma (0/49)    - Acellular mucin present in one (1) lymph node   - See staging synoptic (ypT3N0)       12/31/2024 -  Chemotherapy      alteplase (CATHFLO), 2 mg, Intracatheter, Every 1 Minute as needed, 0 of 6 cycles  fluorouracil (ADRUCIL), 400 mg/m2 = 770 mg, Intravenous, Once, 0 of 6  cycles  irinotecan (CAMPTOSAR) chemo infusion, 90 mg/m2 = 173 mg (50 % of original dose 180 mg/m2), Intravenous, Once, 0 of 6 cycles  Dose modification: 90 mg/m2 (original dose 180 mg/m2, Cycle 1, Reason: Anticipated Tolerance, Comment: 50% dose reduction due to pre-existing diarrhea)  leucovorin calcium IVPB, 400 mg/m2 = 768 mg, Intravenous, Once, 0 of 6 cycles  fluorouracil (ADRUCIL) ambulatory infusion Soln, 1,200 mg/m2/day = 4,610 mg, Intravenous, Over 46 hours, 0 of 6 cycles       Right tonsillar squamous cell carcinoma (HCC)    7/27/2023 Initial Diagnosis      Right tonsillar squamous cell carcinoma (HCC)       7/27/2023 -  Cancer Staged      Staging form: Pharynx - Oropharynx, AJCC 8th Edition  - Clinical stage from 7/27/2023: Stage III (cT1, cN3, cM0, p16+) - Signed by Evan Plummer MD on 7/27/2023  Stage prefix: Initial diagnosis          9/16/2024 - 11/13/2024 Radiation         Plan ID Energy Fractions Dose per Fraction (cGy) Dose Correction (cGy) Total Dose Delivered (cGy) Elapsed Days   Head_Neck 6X-FFF 35 / 35 200 0 7,000 58    C2: 9/16/2024 - 11/13/2024 9/17/2024 - 10/21/2024 Chemotherapy      alteplase (CATHFLO), 2 mg, Intracatheter, Every 1 Minute as needed, 6 of 6 cycles  Administration: 2 mg (10/21/2024)  CISplatin (PLATINOL) infusion, 70 mg (original dose 40 mg/m2), Intravenous, Once, 6 of 6 cycles  Dose modification: 70 mg (original dose 40 mg/m2, Cycle 1, Reason: Anticipated Tolerance), 30 mg/m2 (original dose 40 mg/m2, Cycle 4, Reason: Neuropathy, Comment: dose reduction to 30 mg/m2 due to neuropathy)  Administration: 70 mg (9/17/2024), 70 mg (9/23/2024), 70 mg (9/30/2024), 59.1 mg (10/7/2024), 59.1 mg (10/14/2024), 59.1 mg (10/21/2024)  sodium chloride, 500 mL, Intravenous, Once, 6 of 6 cycles  Administration: 500 mL (9/17/2024), 500 mL (9/17/2024), 500 mL (9/23/2024), 500 mL (9/23/2024), 500 mL (9/30/2024), 500 mL (9/30/2024), 500 mL (10/7/2024), 500 mL (10/7/2024), 500 mL  "(10/14/2024), 500 mL (10/14/2024), 500 mL (10/21/2024)  fosaprepitant (EMEND) IVPB, 150 mg, Intravenous, Once, 6 of 6 cycles  Administration: 150 mg (9/17/2024), 150 mg (9/23/2024), 150 mg (9/30/2024), 150 mg (10/7/2024), 150 mg (10/14/2024), 150 mg (10/21/2024)  IVPB builder, , Intravenous, Once, 1 of 1 cycle  Administration: Unknown dose (10/21/2024)           Patient's goal(s): \"to learn how to swallow again\"    Pain: present (rating: 10/10 in throat) -- Patient stated that she self-administered pain medication prior to presenting to today's appointment.    Hearing: WFL for testing  Vision: WFL for testing    Home environment/lifestyle: Lives with daughter and son  Highest level of education: Did not test  Vocational status: Retired      Objective (testing):  HEAD & NECK CANCER DYSPHAGIA EVALUATION:    -Reason for referral: Weight loss and Signs/symptoms of dysphagia    -Subjective report of swallowing difficulty: Coughing, Choking, Pain with swallowing, and Globus sensation     Functional Outcome Measurements    -Functional Oral Intake Scale (FOIS): 2 - tube dependent with minimal/inconsistent oral intake    -Eating Assessment Tool (EAT-10) is a self-administered, symptom-specific outcome instrument for dysphagia. It consists of ten statements that a patient rates on a scale of 0-4, with 0=no problem to 4=severe problem. A score of 3 or more is abnormal. Patient obtained a score of 39/40. (see scanned document)      Head and Neck Cancer Checklist:  *Patient indicated that she is experiencing difficulties in the following areas:    SWALLOWING: Food pocketing, Feeling of foods/liquids getting \"stuck\" in the throat, Needing to swallow many times to clear food from the mouth and/or throat, Coughing/choking when swallowing, Throat clearing after swallowing, Gurgly, \"wet-sounding\" voice after swallowing, Pain with chewing and/or swallowing, Dry mouth, Altered taste sensation, Decreased appetite, and Weight " "loss    SPEECH: Slurred speech, \"Thick/heavy\" feeling in tongue when speaking, People ask you to repeat yourself more often    VOICE: Hoarse/husky voice, Decreased vocal loudness      -Difficulty swallowing: Solids, Liquids, and Pills    -Current diet (solids): Very limited P.O. intake (e.g., mashed potatoes, soup--e.g., chicken rice, yogurt)  -Current diet (liquids): Thin (sips of water, jello, has tried Ensure)  -Current pill intake method: Takes small pills whole with water, takes larger pills crushed in puree  -Alternative Feeding Method?: PEG tube (at most, taking 6 cans of TF formula/day -- patient noted that it upsets her stomach)    -Facial appearance Symmetrical   -Dentition Upper dentures, limited bottom (a few natural)   -Labial function WFL   -Lingual function Decreased ROM (elevation) and Decreased strength (bilateral) -- white coating visualized near base of tongue (?thrush)   -Velar function Unable to visualize   -Trismus (i.e., limited jaw opening) Present (measurement: 20 mm)   -Lymphedema Absent   -Xerostomia  Present (self-rating: 10)   -Neoplastic pain Present    -Odynophagia (i.e., pain with swallowing) Present (scale: 10)   -Mucositis  Absent   -Dysgeusia (i.e., altered taste)  Present     LIQUID CONSISTENCY TESTING:   Item(s) tested: (Thin) - water    Administered by: Cup, Spoon, and Straw    Clinical findings:  -Oral phase impairments: N/A, patient WFL  -Pharyngeal phase impairments: reduced hyolaryngeal elevation, reduced hyolaryngeal excursion, and other: c/o globus sensation    Other notes: Patient reported c/o pain with swallowing.    Strategies, attempts, and responses: Patient independently utilized multiple swallows, which she reported helped to clear the globus sensation.      SOLID CONSISTENCY TESTING:  Item(s) tested: (Puree) - applesauce (*Patient expressed that she felt comfortable completing a trial of puree on this date of service.)    Administered by: Self-fed    Clinical " findings:  -Oral phase impairments: increased time for cohesive bolus formation and AP transfer  -Pharyngeal phase impairments: reduced hyolaryngeal elevation, reduced hyolaryngeal excursion, and other: c/o globus sensation    Other notes: Patient reported c/o pain with swallowing.    Strategies, attempts, and responses: Patient utilized liquid wash and multiple swallows, which she reported helped to clear the globus sensation.      Treatment:  Clinician presented patient with education on the stages of swallowing using a visual model, aspiration precautions, diet recommendations, safe swallowing strategies, etc. Patient was also presented with a HEP (pharyngeal strengthening exercises). Supplemental handouts were provided to facilitate increased carryover of recommendations. Education was also provided on the purpose of a videofluoroscopic swallow study (VFSS), which provides a direct, dynamic view of oral, pharyngeal, and upper esophageal function during swallowing. Central Scheduling contact information to schedule appointment was provided to patient prior to leaving today's appointment.    [facility] Home Exercise Program:  Access Code: [email or web address]  URL: [email or web address]      Visit Tracking:  POC   Expires Auth Expiration Date ST Visit Limit   04/06/2025 or 10th visit 12/31/2025 [facility]          Visit/Unit Tracking:  Auth Status Date 01/06/25   Not required Used 1    Remaining 9       Intervention Comments:  -Initial dysphagia evaluation with patient (35 minutes)  -Dysphagia treatment, including discussion about recommendations, POC/goals, and HEP review (10 minutes)

## 2025-01-06 NOTE — TELEPHONE ENCOUNTER
Spoke with PEPE Santos at [facility] to see if the patient has been taking her oral potassium supplement. Patient has not been taking it for a week and needs a refill. Patient is not able to tolerate much PO, but uses her tube to give meds and feeds.

## 2025-01-07 ENCOUNTER — PATIENT OUTREACH (OUTPATIENT)
Dept: HEMATOLOGY ONCOLOGY | Facility: CLINIC | Age: 70
End: 2025-01-07

## 2025-01-07 NOTE — PROGRESS NOTES
Message left with speech therapy that pt needs LYFT  set up for her appointment 1/9  Spoke with pt to let her know the therapy office has been notified that she needs transportation for this appointment, she understood, and was thankful for the assistance

## 2025-01-07 NOTE — PROGRESS NOTES
"Daily Speech Treatment Note    Today's date: 2025  Patient’s name: Maira Moura  : 1955  MRN: [id number]  Safety measures: HTN, CVA, GERD, active CA, s/p PEG tube placement, aspiration precautions  Current recommended diet: PEG tube for primary nutrition/hydration; puree with thin liquids P.O. as tolerated  Referring provider: Harika Medina DO    Encounter Diagnosis     ICD-10-CM    1. Dysphagia, oropharyngeal phase  R13.12       2. Status post tonsillectomy  Z90.89       3. 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         Visit Tracking:  POC   Expires Auth Expiration Date ST Visit Limit   2025 or 10th visit 2025 [facility]          Visit/Unit Tracking:  Auth Status Date 25   Not required Used 1 2    Remaining 9 8     Subjective/Behavioral:  -Patient reported that her LYFT ride service did not pick her up today. Clinician attempted to assist patient with scheduling a return trip home, but it was reported by a representative on the telephone that it was unable to be scheduled because patient did not have a ride scheduled today. Clinician will reach back out to a member of patient's care team who may be able to assist clinician with making LYFT rides for patient. It was reported by the service that only one provider is listed under patient's account to schedule rides for her.    -It was reported that patient had a telephone follow-up with Nutrition yesterday (2025). Per chart review,   \"...Pt called RD back. She reports she is using Jevity 1.5 via feeding tube 1-2x/day - does it herself by bolus. She states it has been giving her stomach upset and diarrhea the past few weeks. She reports nausea as well. She states that she takes little by mouth due to painful swallowing. She states that she has Ensure at home and drinks that occasionally and denies this causing her diarrhea. She gets in ~1 bottle of water daily by mouth + 120mL water flushes 2x/day. She " "reports that she has gone  without using tube for a day due to symptoms and on those days does not have diarrhea. Of note pt has recently had progression of disease and starting FOLFIRI today. Patient was using Jevity 1.5 for awhile now and reports she used to tolerate it fine. She was getting in up to 6 cartons. She has lost 10# in the past week. Discussed with Hanane Diehl RN and asked if she could educate her on anti-diarrheals. I encouraged the patient to use Ensure via tube since it is not giving her diarrhea. Encouraged to aim for 3x/day for now via tube and see if she can tolerate. In the meantime will have a sample case of peptide formula sent to her house to see if she tolerates better. Will call patient next week to check in and determine formal follow up plan. All questions and concerns addressed during today’s visit.  Maira has RD contact information...\"    Objective/Assessment:  -Reviewed patient's home exercises/activities completed since last appointment. Patient stated that she worked on pharyngeal strengthening exercises for her HEP.    Short-term goals:  Patient will receive a videofluoroscopic swallow study (VFSS) to assess her current swallowing function to r/o penetration or aspiration, to determine the safest, least restrictive diet, and to recommended the appropriate rehabilitation exercises (to be achieved in 2-3 weeks).   -Patient reported that she is scheduled for a VFSS on 01/13/2025.    Patient will be educated on safe swallowing compensatory strategies (e.g., upright posture, small bites/sips, slow rate, alternation of consistencies, multiple swallows, effortful swallow, etc.) to facilitate increased safety with P.O. intake (to be achieved in 4-6 weeks).      Patient will tolerate P.O. trials of her recommended diet with the implementation of safe swallowing strategies without overt s/sx of penetration and/or aspiration during skilled therapy sessions in this certification period " (to be achieved in 4-6 weeks).   -Patient reported that she took her pills whole with 1/3 cup of tapioca pudding and water this morning. Patient did not report any trouble with P.O. intake.    Patient will perform oral motor exercises using IOPI device on lingual muscles to facilitate increased function and strength by 25% for improved swallowing function (to be achieved in 6-8 weeks).     Strength & Endurance Testing:  The IOPI Pro is used by medical professionals to measure, evaluate, and increase the strength and endurance of the tongue and lip in patients with oral motor disorders, including dysphagia and dysarthria. The IOPI Pro measures the maximum pressure (Pmax) a patient can produce in an air-filled bulb when it is compressed as hard as possible by the tongue or lip against a hard surface (e.g., the palate or teeth, respectively). Pmax is a measure of strength, expressed in kilopascals (kPa, an international unit of pressure).      For patients with dysphagia or dysarthria, oral motor fatigability may be of interest. The IOPI Pro can be used to assess tongue fatigability. Low endurance values are an indicator of a high fatigability. Endurance is measured with the IOPI Pro by quantifying the length of time that a patient can maintain 50% of his or her Pmax. This procedure is conducted in Target Mode by setting the target value to 50% of the patient's Pmax and timing how long the patient can hold the top (green) light on.    The medical professional determines what target value is appropriate for exercise therapy purposes and provides specific instructions to the patient for a particular exercise protocol. In Target Mode, the pressure required to illuminate the green light a the top of the light array can be adjusted using the Set Target arrow buttons. This green light is used as a visual target for the patient.     Pmax (after 3 trials): Norm for age/gender: Status:   Tongue tip 50 56 BELOW AVERAGE   Tongue  back 47 50 BELOW AVERAGE   Right lip DNT 28 N/A   Left lip DNT 28 N/A       IOPI Pro Pmax Data Tracker Grid (from [id number]):     01/09/25        Tongue tip (GOAL = 56) 50        Tongue back (GOAL = 50) 47            IOPI Pro -- Today's Exercise Targets:     Pmax (after 3 trials): Effort level: Target for today's treatment:   Tongue tip  (GOAL = 56) 50 65% 33   Tongue back  (GOAL = 50) 47 65% 31       IOPI Pro -- Exercise Tracker Grid (from [id number]):     01/09/25        Tongue tip 3 sets of 30 reps (pumps)          Tongue back 3 sets of 30 reps (pumps)              Patient will independently complete pharyngeal strengthening exercises (e.g., Effortful swallow, Mendelsohn, Evelia) daily to facilitate increased pharyngeal strength and coordination (to be achieved in 4-6 weeks).     Plan:  -Patient was provided with home exercises/activities to target goals in plan of care at the end of today's session.  -Continue with current plan of care.

## 2025-01-08 ENCOUNTER — HOSPITAL ENCOUNTER (OUTPATIENT)
Dept: INFUSION CENTER | Facility: CLINIC | Age: 70
Discharge: HOME | End: 2025-01-08
Payer: MEDICARE

## 2025-01-08 ENCOUNTER — DOCUMENTATION (OUTPATIENT)
Dept: NUTRITION | Facility: HOSPITAL | Age: 70
End: 2025-01-08

## 2025-01-08 ENCOUNTER — TELEPHONE (OUTPATIENT)
Dept: NUTRITION | Facility: HOSPITAL | Age: 70
End: 2025-01-08

## 2025-01-08 VITALS — BODY MASS INDEX: 27.1 KG/M2 | WEIGHT: 168.65 LBS | HEIGHT: 66 IN

## 2025-01-08 DIAGNOSIS — Z17.0 MALIGNANT NEOPLASM OF UPPER-OUTER QUADRANT OF RIGHT BREAST IN FEMALE, ESTROGEN RECEPTOR POSITIVE (HCC): Primary | ICD-10-CM

## 2025-01-08 DIAGNOSIS — C50.411 MALIGNANT NEOPLASM OF UPPER-OUTER QUADRANT OF RIGHT BREAST IN FEMALE, ESTROGEN RECEPTOR POSITIVE (HCC): Primary | ICD-10-CM

## 2025-01-08 DIAGNOSIS — C78.7 METASTATIC COLON CANCER TO LIVER (HCC): ICD-10-CM

## 2025-01-08 DIAGNOSIS — C18.9 METASTATIC COLON CANCER TO LIVER (HCC): Primary | ICD-10-CM

## 2025-01-08 DIAGNOSIS — C18.9 METASTATIC COLON CANCER TO LIVER (HCC): ICD-10-CM

## 2025-01-08 DIAGNOSIS — C78.7 METASTATIC COLON CANCER TO LIVER (HCC): Primary | ICD-10-CM

## 2025-01-08 PROCEDURE — 96415 CHEMO IV INFUSION ADDL HR: CPT

## 2025-01-08 PROCEDURE — G0498 CHEMO EXTEND IV INFUS W/PUMP: HCPCS

## 2025-01-08 PROCEDURE — 96411 CHEMO IV PUSH ADDL DRUG: CPT

## 2025-01-08 PROCEDURE — 96375 TX/PRO/DX INJ NEW DRUG ADDON: CPT

## 2025-01-08 PROCEDURE — 96368 THER/DIAG CONCURRENT INF: CPT

## 2025-01-08 PROCEDURE — 96413 CHEMO IV INFUSION 1 HR: CPT

## 2025-01-08 PROCEDURE — 96367 TX/PROPH/DG ADDL SEQ IV INF: CPT

## 2025-01-08 RX ORDER — SODIUM CHLORIDE 9 MG/ML
20 INJECTION, SOLUTION INTRAVENOUS ONCE
Status: COMPLETED | OUTPATIENT
Start: 2025-01-08 | End: 2025-01-08

## 2025-01-08 RX ORDER — ATROPINE SULFATE 1 MG/ML
0.25 INJECTION, SOLUTION INTRAVENOUS ONCE
Status: COMPLETED | OUTPATIENT
Start: 2025-01-08 | End: 2025-01-08

## 2025-01-08 RX ORDER — FLUOROURACIL 50 MG/ML
400 INJECTION, SOLUTION INTRAVENOUS ONCE
Status: COMPLETED | OUTPATIENT
Start: 2025-01-08 | End: 2025-01-08

## 2025-01-08 RX ORDER — ATROPINE SULFATE 1 MG/ML
0.25 INJECTION, SOLUTION INTRAVENOUS ONCE AS NEEDED
Status: DISCONTINUED | OUTPATIENT
Start: 2025-01-08 | End: 2025-01-11 | Stop reason: HOSPADM

## 2025-01-08 RX ADMIN — LEUCOVORIN CALCIUM 800 MG: 500 INJECTION, POWDER, LYOPHILIZED, FOR SOLUTION INTRAMUSCULAR; INTRAVENOUS at 13:12

## 2025-01-08 RX ADMIN — SODIUM CHLORIDE 20 ML/HR: 9 INJECTION, SOLUTION INTRAVENOUS at 12:34

## 2025-01-08 RX ADMIN — DEXAMETHASONE SODIUM PHOSPHATE: 10 INJECTION, SOLUTION INTRAMUSCULAR; INTRAVENOUS at 12:34

## 2025-01-08 RX ADMIN — IRINOTECAN HYDROCHLORIDE 180 MG: 20 INJECTION, SOLUTION INTRAVENOUS at 13:12

## 2025-01-08 RX ADMIN — ATROPINE SULFATE 0.25 MG: 1 INJECTION, SOLUTION INTRAVENOUS at 13:04

## 2025-01-08 RX ADMIN — MAGNESIUM SULFATE HEPTAHYDRATE: 500 INJECTION, SOLUTION INTRAMUSCULAR; INTRAVENOUS at 11:29

## 2025-01-08 RX ADMIN — FLUOROURACIL 770 MG: 50 INJECTION, SOLUTION INTRAVENOUS at 14:44

## 2025-01-08 NOTE — TELEPHONE ENCOUNTER
Brief Phone call:  Pt called RD back. She reports she is using Jevity 1.5 via feeding tube 1-2x/day - does it herself by bolus. She states it has been giving her stomach upset and diarrhea the past few weeks. She reports nausea as well. She states that she takes little by mouth due to painful swallowing. She states that she has Ensure at home and drinks that occasionally and denies this causing her diarrhea. She gets in ~1 bottle of water daily by mouth + 120mL water flushes 2x/day. She reports that she has gone  without using tube for a day due to symptoms and on those days does not have diarrhea. Of note pt has recently had progression of disease and starting FOLFIRI today. Patient was using Jevity 1.5 for awhile now and reports she used to tolerate it fine. She was getting in up to 6 cartons. She has lost 10# in the past week. Discussed with Hanane Diehl RN and asked if she could educate her on anti-diarrheals. I encouraged the patient to use Ensure via tube since it is not giving her diarrhea. Encouraged to aim for 3x/day for now via tube and see if she can tolerate. In the meantime will have a sample case of peptide formula sent to her house to see if she tolerates better. Will call patient next week to check in and determine formal follow up plan. All questions and concerns addressed during today’s visit.  Maira has RD contact information.    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, unresectable     7/31/2023 - FOLFOX     11/20/2023 - opdivo added to FOLFOX     12/18/2023-cycle 11-20% dose reduction of 5-FU bolus and oxaliplatin due to increased fatigue     Jan 2024 - oxaliplatin removed from treatment plan due to neuropathy - PET  "scan shows good disease control in all areas except colon lesion     3/2024 - right hemicolectomy - pT3N0     6/3/24 - cryoablation to liver (no interruption to systemic therapy)     7/30/2024 - stop folfox     9/2024 - 10/2024 - cis/RT to tonsils and cervical Lns    12/2024 - disease progression of colon cancer in the liver     Malignant neoplasm of right female breast (HCC)   11/23/2018 Initial Diagnosis    Malignant neoplasm of right female breast (HCC)     11/23/2018 Biopsy    Rt Breast US BX 1100 8cmfn, 4 passes 12g Marquee:  - Invasive breast carcinoma of no special type (ductal NST/invasive ductal carcinoma).  - grade 2  - %  - NH 95%  - HER2 negative     12/20/2018 Surgery    Right partial mastectomy and SLN biopsy:  Infiltrating ductal carcinoma, Grade 2/3, felt to be between 2.0 cm and 2.5 cm in greatest dimension  - No invasive tumor is seen at inked margins, but there is a focus of DCIS seen within approximately 1mm of the inked medial margin  - no LVI  - no LCIS  - 0/2 LN's  at least Stage IIA - pT2, pN0, cM0, G2.    - Dr Coronado     12/20/2018 -  Cancer Staged    Stage IIA - pT2, pN0, cM0, G2.       1/4/2019 Genomic Testing    Oncotype DX score: 13     2/1/2019 -  Hormone Therapy    anastrozole 1 mg daily as adjuvant endocrine therapy    - Dr Daley       2/14/2019 - 4/3/2019 Radiation    Course: C1    Plan ID Energy Fractions Dose per Fraction (cGy) Dose Correction (cGy) Total Dose Delivered (cGy) Elapsed Days   R Breast 10X/6X 25 / 25 200 0 5,000 40   R BRST BOOST 16E 5 / 5 200 0 1,000 7      Treatment dates:  C1: 2/14/2019 - 4/3/2019       Metastatic colon cancer to liver (HCC)   7/6/2023 Genetic Testing    CARIS Results:   KRAS Pathogenic Variant Exon 2  p.G12D : lack of benefit of cetuximab, panitumumab Level 2   No other actionable mutation; MSI stable; TMB low   MiFOLOXAi Results: \"Decreased Benefit of FOLFOX + Bevacizumab in first line metastatic CRC.  This patient may achieve improved " "results by receiving an alternative to FOLFOX, such as FOLFIRI, as their initial regimen. As an adjustment to frontline FOLFOXIRI following toxicity: This patient may achieve improved results by removing the oxaliplatin portion of their regimen\".         7/11/2023 Initial Diagnosis    Metastatic colon cancer to liver (HCC)     7/13/2023 -  Cancer Staged    Staging form: Colon and Rectum, AJCC 8th Edition  - Clinical stage from 7/13/2023: cT3, cN0, pM1 - Signed by Harika Medina DO on 9/28/2023  Total positive nodes: 0       7/31/2023 - 8/1/2024 Chemotherapy    cyanocobalamin, 1,000 mcg, Intramuscular, Every 30 days, 7 of 9 cycles  Administration: 1,000 mcg (5/9/2024), 1,000 mcg (5/24/2024), 1,000 mcg (6/20/2024), 1,000 mcg (7/3/2024), 1,000 mcg (7/18/2024), 1,000 mcg (8/1/2024)  potassium chloride, 20 mEq, Intravenous, Once, 2 of 2 cycles  Administration: 20 mEq (11/6/2023), 20 mEq (11/20/2023)  alteplase (CATHFLO), 2 mg, Intracatheter, Every 1 Minute as needed, 21 of 23 cycles  Administration: 2 mg (1/3/2024)  pegfilgrastim (NEULASTA), 6 mg, Subcutaneous, Once, 12 of 12 cycles  Administration: 6 mg (10/25/2023), 6 mg (11/8/2023), 6 mg (11/22/2023), 6 mg (12/6/2023), 6 mg (12/20/2023), 6 mg (1/12/2024), 6 mg (1/25/2024), 6 mg (4/25/2024), 6 mg (5/9/2024), 6 mg (5/24/2024), 6 mg (6/20/2024)  fluorouracil (ADRUCIL), 895 mg, Intravenous, Once, 21 of 23 cycles  Dose modification: 320 mg/m2 (original dose 400 mg/m2, Cycle 11)  Administration: 900 mg (7/31/2023), 900 mg (8/14/2023), 900 mg (8/28/2023), 900 mg (9/11/2023), 900 mg (9/25/2023), 900 mg (10/9/2023), 900 mg (10/23/2023), 895 mg (11/6/2023), 895 mg (11/20/2023), 895 mg (12/4/2023), 700 mg (12/18/2023), 680 mg (1/10/2024), 680 mg (1/23/2024), 625 mg (4/23/2024), 625 mg (5/7/2024), 625 mg (5/22/2024), 625 mg (6/4/2024), 625 mg (6/18/2024), 625 mg (7/1/2024), 625 mg (7/16/2024), 625 mg (7/30/2024)  nivolumab (OPDIVO) IVPB, 240 mg (100 % of original dose 240 mg), " Intravenous, Once, 13 of 15 cycles  Dose modification: 240 mg (original dose 240 mg, Cycle 9)  Administration: 240 mg (11/20/2023), 240 mg (12/4/2023), 240 mg (12/18/2023), 240 mg (1/10/2024), 240 mg (1/23/2024), 240 mg (4/23/2024), 240 mg (5/7/2024), 240 mg (5/22/2024), 240 mg (6/4/2024), 240 mg (6/18/2024), 240 mg (7/1/2024), 240 mg (7/16/2024), 240 mg (7/30/2024)  leucovorin calcium IVPB, 896 mg, Intravenous, Once, 21 of 23 cycles  Administration: 900 mg (7/31/2023), 900 mg (8/14/2023), 900 mg (8/28/2023), 900 mg (9/11/2023), 900 mg (9/25/2023), 900 mg (10/9/2023), 900 mg (10/23/2023), 900 mg (11/6/2023), 900 mg (11/20/2023), 900 mg (12/4/2023), 900 mg (12/18/2023), 850 mg (1/10/2024), 850 mg (1/23/2024), 800 mg (4/23/2024), 800 mg (5/7/2024), 800 mg (5/22/2024), 800 mg (6/4/2024), 800 mg (6/18/2024), 800 mg (7/1/2024), 800 mg (7/16/2024), 800 mg (7/30/2024)  oxaliplatin (ELOXATIN) chemo infusion, 85 mg/m2 = 190.4 mg, Intravenous, Once, 12 of 12 cycles  Dose modification: 68 mg/m2 (original dose 85 mg/m2, Cycle 11, Reason: Other (Must fill in a comment), Comment: increased fatigue)  Administration: 190.4 mg (7/31/2023), 190.4 mg (8/14/2023), 200 mg (8/28/2023), 200 mg (9/11/2023), 200 mg (9/25/2023), 200 mg (10/9/2023), 200 mg (10/23/2023), 200 mg (11/6/2023), 200 mg (11/20/2023), 190.4 mg (12/4/2023), 150 mg (12/18/2023), 150 mg (1/10/2024)  fluorouracil (ADRUCIL) ambulatory infusion Soln, 1,200 mg/m2/day = 5,375 mg, Intravenous, Over 46 hours, 21 of 23 cycles     9/26/2023 -  Cancer Staged    Staging form: Colon and Rectum, AJCC 8th Edition  - Pathologic: pT3, pN0, cM1 - Signed by Vickie Israel MD on 4/3/2024  Total positive nodes: 0       3/12/2024 Surgery    A. Terminal ileum, appendix, right colon (right hemicolectomy):  - Adenocarcinoma (5.2cm)  - Forty-nine (49) lymph nodes negative for carcinoma (0/49)    - Acellular mucin present in one (1) lymph node   - See staging synoptic (ypT3N0)     1/8/2025 -   Chemotherapy    alteplase (CATHFLO), 2 mg, Intracatheter, Every 1 Minute as needed, 1 of 6 cycles  fluorouracil (ADRUCIL), 400 mg/m2 = 770 mg, Intravenous, Once, 1 of 6 cycles  irinotecan (CAMPTOSAR) chemo infusion, 173 mg (50 % of original dose 180 mg/m2), Intravenous, Once, 1 of 6 cycles  Dose modification: 90 mg/m2 (original dose 180 mg/m2, Cycle 1, Reason: Anticipated Tolerance, Comment: 50% dose reduction due to pre-existing diarrhea)  leucovorin calcium IVPB, 768 mg, Intravenous, Once, 1 of 6 cycles  fluorouracil (ADRUCIL) ambulatory infusion Soln, 1,200 mg/m2/day = 4,610 mg, Intravenous, Over 46 hours, 1 of 6 cycles     Right tonsillar squamous cell carcinoma (HCC)   7/27/2023 Initial Diagnosis    Right tonsillar squamous cell carcinoma (HCC)     7/27/2023 -  Cancer Staged    Staging form: Pharynx - Oropharynx, AJCC 8th Edition  - Clinical stage from 7/27/2023: Stage III (cT1, cN3, cM0, p16+) - Signed by Evan Plummer MD on 7/27/2023  Stage prefix: Initial diagnosis       9/16/2024 - 11/13/2024 Radiation      Plan ID Energy Fractions Dose per Fraction (cGy) Dose Correction (cGy) Total Dose Delivered (cGy) Elapsed Days   Head_Neck 6X-FFF 35 / 35 200 0 7,000 58    C2: 9/16/2024 - 11/13/2024 9/17/2024 - 10/21/2024 Chemotherapy    alteplase (CATHFLO), 2 mg, Intracatheter, Every 1 Minute as needed, 6 of 6 cycles  Administration: 2 mg (10/21/2024)  CISplatin (PLATINOL) infusion, 70 mg (original dose 40 mg/m2), Intravenous, Once, 6 of 6 cycles  Dose modification: 70 mg (original dose 40 mg/m2, Cycle 1, Reason: Anticipated Tolerance), 30 mg/m2 (original dose 40 mg/m2, Cycle 4, Reason: Neuropathy, Comment: dose reduction to 30 mg/m2 due to neuropathy)  Administration: 70 mg (9/17/2024), 70 mg (9/23/2024), 70 mg (9/30/2024), 59.1 mg (10/7/2024), 59.1 mg (10/14/2024), 59.1 mg (10/21/2024)  sodium chloride, 500 mL, Intravenous, Once, 6 of 6 cycles  Administration: 500 mL (9/17/2024), 500 mL (9/17/2024),  500 mL (9/23/2024), 500 mL (9/23/2024), 500 mL (9/30/2024), 500 mL (9/30/2024), 500 mL (10/7/2024), 500 mL (10/7/2024), 500 mL (10/14/2024), 500 mL (10/14/2024), 500 mL (10/21/2024)  fosaprepitant (EMEND) IVPB, 150 mg, Intravenous, Once, 6 of 6 cycles  Administration: 150 mg (9/17/2024), 150 mg (9/23/2024), 150 mg (9/30/2024), 150 mg (10/7/2024), 150 mg (10/14/2024), 150 mg (10/21/2024)  IVPB builder, , Intravenous, Once, 1 of 1 cycle  Administration: Unknown dose (10/21/2024)       Patient Active Problem List   Diagnosis    Primary hypertension    Mixed hyperlipidemia    Malignant neoplasm of right female breast (HCC)    Vitamin D deficiency    Prediabetes    Right thyroid nodule    GERD (gastroesophageal reflux disease)    Obesity    Metastatic colon cancer to liver (HCC)    Right tonsillar squamous cell carcinoma (HCC)    Poor appetite    Nutritional anemia    Dehydration    Drug-induced constipation    Chemotherapy induced neutropenia (HCC)    Stage 3a chronic kidney disease (HCC)    Thrombocytopenia (HCC)    Neuropathy    Diastolic dysfunction    Hypokalemia    Leukemoid reaction    GOGO (acute kidney injury) (HCC)    Acute post-operative pain    At risk for constipation    At risk for delirium    Palliative care encounter    Physical deconditioning    History of CVA (cerebrovascular accident)    Chronic nasal congestion    Elevated LFTs    Vitamin B12 deficiency    Neoplastic malignant related fatigue    Sensation, choking    S/P percutaneous endoscopic gastrostomy (PEG) tube placement (HCC)    Pancytopenia due to chemotherapy (HCC)    Cancer related pain    Hypomagnesemia    Functional diarrhea     Past Medical History:   Diagnosis Date    Anemia     Arthritis     Body mass index (BMI) 40.0-44.9, adult (HCC) 9/22/2023    Breast cancer (HCC) 12/17/2018    Cancer (HCC)     right breast, colon, liver, right tonsil    Cervical lymphadenopathy 3/21/2023    CT neck on 3/30/2023- Large right level 2A lymphadenopathy  as described above and suspicious for neoplasm.  Correlation with histopathology is recommended.  Mild asymmetric prominence of the right palatine tonsil with otherwise no definitive nodular enhancing lesions identified along the course of the aerodigestive tract.     5/26/23- FNA of this node was nonrevealing for tissue etiology, but it d    Encounter for screening for HIV 07/07/2022    Follow-up examination 04/04/2023    GERD (gastroesophageal reflux disease)     Hyperlipidemia     Hypertension     Obesity     Stroke (HCC)     TIA     Stroke (HCC)     TIA     Transaminitis 09/22/2021    Vasomotor rhinitis 8/9/2018    Refilled flonase today. Stopped taking since January 2022     Past Surgical History:   Procedure Laterality Date    BREAST BIOPSY Right 11/23/2018    us guided bx cancer    BREAST SURGERY      COLONOSCOPY      ESOPHAGOSCOPY N/A 07/20/2023    Procedure: ESOPHAGOSCOPY;  Surgeon: David Chapa MD;  [facility];  Service: ENT    HEMICOLOECTOMY W/ ANASTOMOSIS Right 3/12/2024    Procedure: RIGHT COLECTOMY WITH ROBOTICS;  Surgeon: Vickie Israel MD;  [facility];  Service: Surgical Oncology    IR BIOPSY LIVER MASS  06/27/2023    IR CRYOABLATION  6/3/2024    IR GASTROSTOMY TUBE PLACEMENT  10/2/2024    IR PORT CHECK  01/03/2024    IR PORT PLACEMENT  07/28/2023    IR PORT STRIPPING  01/09/2024    LAPAROTOMY N/A 3/12/2024    Procedure: LAPAROTOMY EXPLORATORY W/ ROBOTICS;  Surgeon: Vickie Israel MD;  [facility];  Service: Surgical Oncology    CA BIOPSY NASOPHARYNX VISIBLE LESION SIMPLE N/A 10/9/2024    Procedure: NASOPHARYNGOSCOPY with biospy;  Surgeon: Juanito Matthew MD;  [facility];  Service: ENT    [facility] [place] W/CELL WASHG SPX N/A 07/20/2023    Procedure: BRONCHOSCOPY;  Surgeon: David Chapa MD;  [facility];  Service: ENT    CA LARYNGOSCOPY W/BIOPSY MICROSCOPE/TELESCOPE N/A 07/20/2023    Procedure: MICRODIRECT LARYNGOSCOPY  WITH BIOPSY;  Surgeon: David Chapa MD;  [facility];  Service: ENT    MT LARYNGOSCOPY W/WO TRACHEOSCOPY DX EXCEPT  N/A 10/9/2024    Procedure: DIRECT LARYNGOSCOPY;  Surgeon: Juanito Matthew MD;  [facility];  Service: ENT    MT MASTECTOMY PARTIAL W/AXILLARY LYMPHADENECTOMY Right 2018    Procedure: ULTRASOUND LOCALIZED PARTIAL MASTECTOMY W/SENTINEL NODE BIOPSY POSS AXILLARY DISSECTION;  Surgeon: Zahida Coronado MD;  Location: [facility];  Service: General    MT TONSILLECTOMY PRIMARY/SECONDARY AGE 13/> N/A 10/9/2024    Procedure: TONSILLECTOMY;  Surgeon: Juanito Matthew MD;  [facility];  Service: ENT    TUBAL LIGATION      US GUIDED BREAST BIOPSY RIGHT COMPLETE Right 2018    US GUIDED INJECTION FOR RESEARCH STUDY  2018    US GUIDED INJECTION FOR RESEARCH STUDY  2018    US GUIDED INJECTION FOR RESEARCH STUDY  2019    US GUIDED LYMPH NODE BIOPSY RIGHT  2023       Review of Medications:     Current Outpatient Medications:     acetaminophen (TYLENOL) 160 mg/5 mL liquid, Take 20 mL (640 mg total) by mouth every 6 (six) hours as needed for mild pain for up to 10 days, Disp: 473 mL, Rfl: 0    amLODIPine (NORVASC) 5 mg tablet, 1 tablet (5 mg total) by Per G Tube route daily, Disp: 30 tablet, Rfl: 0    anastrozole (ARIMIDEX) 1 mg tablet, Take 1 tablet (1 mg total) by mouth daily, Disp: 90 tablet, Rfl: 3    atorvastatin (LIPITOR) 40 mg tablet, Take 1 tablet (40 mg total) by mouth daily at bedtime, Disp: 30 tablet, Rfl: 2    cholestyramine (QUESTRAN) 4 g packet, Take 1 packet (4 g total) by mouth 3 (three) times a day with meals, Disp: 90 packet, Rfl: 3    diphenhydramine, lidocaine, Al/Mg hydroxide, simethicone (Magic Mouthwash) SUSP, Swish and swallow 10 mL every 4 (four) hours as needed for mouth pain or discomfort, Disp: 480 mL, Rfl: 2    docusate sodium (COLACE) 50 mg capsule, Take 1 capsule (50 mg total) by mouth 2 (two) times a day  (Patient not taking: Reported on 12/20/2024), Disp: 90 capsule, Rfl: 1    ergocalciferol (VITAMIN D2) 50,000 units, Take 1 capsule (50,000 Units total) by mouth once a week, Disp: 90 capsule, Rfl: 3    fluorouracil 4,610 mg in CADD/Elastomeric Infusion Device, Infuse 4,610 mg (1,200 mg/m2/day x 1.92 m2) into a catheter in a vein via infusion device over 46 hours for 2 days  Infusion planned for January 8, 2025., Disp: 1 each, Rfl: 0    folic acid (FOLVITE) 1 mg tablet, Take 1 tablet (1 mg total) by mouth in the morning, Disp: 90 tablet, Rfl: 3    gabapentin (NEURONTIN) 300 mg capsule, Take 1 pills in the morning, 1 pill at lunch and 2 pills before bed, Disp: 120 capsule, Rfl: 0    hydrocortisone 1 % ointment, , Disp: , Rfl:     ibuprofen (MOTRIN) 100 mg/5 mL suspension, Take 20 mL (400 mg total) by mouth every 6 (six) hours as needed for moderate pain (Patient not taking: Reported on 12/20/2024), Disp: 473 mL, Rfl: 0    lidocaine-prilocaine (EMLA) cream, Apply topically as needed for mild pain (Patient not taking: Reported on 12/6/2024), Disp: 30 g, Rfl: 3    losartan (COZAAR) 50 mg tablet, Take 1 tablet (50 mg total) by mouth daily, Disp: 90 tablet, Rfl: 5    magnesium Oxide (MAG-OX) 400 mg TABS, 1 tablet (400 mg total) by Per G Tube route 2 (two) times a day, Disp: 60 tablet, Rfl: 0    mirtazapine (REMERON) 15 mg tablet, Take 1 tablet (15 mg total) by mouth daily at bedtime Patient is also on a 30 mg tablet, for a total dose of 45 mg, Disp: 30 tablet, Rfl: 0    mirtazapine (REMERON) 30 mg tablet, Take 1 tablet (30 mg total) by mouth daily at bedtime, Disp: 30 tablet, Rfl: 0    omeprazole (PriLOSEC) 20 mg delayed release capsule, Take 1 capsule (20 mg total) by mouth daily, Disp: 30 capsule, Rfl: 5    ondansetron (ZOFRAN) 8 mg tablet, Take 1 tablet (8 mg total) by mouth every 8 (eight) hours as needed for nausea or vomiting, Disp: 90 tablet, Rfl: 2    oxyCODONE (ROXICODONE) 5 mg/5 mL solution, Take 5 mL (5 mg  total) by mouth every 6 (six) hours as needed for severe pain ((breakthrough pain)) Max Daily Amount: 20 mg, Disp: 473 mL, Rfl: 0    potassium chloride (Klor-Con M20) 20 mEq tablet, Take 1 tablet (20 mEq total) by mouth 2 (two) times a day (Patient not taking: Reported on 12/20/2024), Disp: 90 tablet, Rfl: 1    potassium chloride 10% oral solution, 15 mL (20 mEq total) by Per G Tube route 2 (two) times a day, Disp: 473 mL, Rfl: 0    prochlorperazine (COMPAZINE) 10 mg tablet, Take 1 tablet (10 mg total) by mouth every 6 (six) hours as needed for nausea or vomiting, Disp: 90 tablet, Rfl: 2    pyridoxine (VITAMIN B6) 50 mg tablet, Take 1 tablet (50 mg total) by mouth daily, Disp: 90 tablet, Rfl: 3    vitamin B-12 (VITAMIN B-12) 1,000 mcg tablet, , Disp: , Rfl:   No current facility-administered medications for this visit.    Facility-Administered Medications Ordered in Other Visits:     alteplase (CATHFLO) injection 2 mg, 2 mg, Intracatheter, Q1MIN PRN, Harika Agostino, DO    alteplase (CATHFLO) injection 2 mg, 2 mg, Intracatheter, Q1MIN PRN, Harika Agostino, DO    alteplase (CATHFLO) injection 2 mg, 2 mg, Intracatheter, Q1MIN PRN, Harika Agostino, DO    Atropine Sulfate injection 0.25 mg, 0.25 mg, Intravenous, Once, Harika Agostino, DO    Atropine Sulfate injection 0.25 mg, 0.25 mg, Intravenous, Once PRN, Harika Agostino, DO    fluorouracil (ADRUCIL) injection 770 mg, 400 mg/m2 (Treatment Plan Recorded), Intravenous, Once, Harika Agostino, DO    irinotecan (CAMPTOSAR) 180 mg in sodium chloride 0.9 % 500 mL chemo infusion, 180 mg, Intravenous, Once, Harika Agostino, DO    leucovorin 800 mg in sodium chloride 0.9 % 250 mL IVPB, 800 mg, Intravenous, Once, Harika Agostino, DO    Most Recent Lab Results:   Lab Results   Component Value Date    WBC 5.68 01/06/2025    IRON 60 11/01/2024    TIBC 177 (L) 11/01/2024    FERRITIN 533 (H) 11/01/2024    CHOLESTEROL 167 04/24/2024    TRIG 137 04/24/2024    HDL 43 (L) 04/24/2024     "LDLCALC 97 04/24/2024    ALT 8 01/06/2025    AST 35 01/06/2025    ALB 3.7 01/06/2025    SODIUM 141 01/06/2025    SODIUM 140 01/03/2025    K 3.4 (L) 01/06/2025    K 3.4 (L) 01/03/2025     01/06/2025    BUN 18 01/06/2025    BUN 14 01/03/2025    CREATININE 1.07 01/06/2025    CREATININE 0.99 01/03/2025    EGFR 53 01/06/2025    PHOS 2.8 11/01/2024    PHOS 3.3 10/30/2024    TSH 1.58 01/03/2022    GLUCOSE 209 (H) 03/12/2024    POCGLU 89 12/27/2024    GLUF 102 (H) 10/30/2024    GLUF 95 09/13/2024    GLUC 99 01/06/2025    HGBA1C 5.5 02/21/2024    HGBA1C 5.9 (H) 06/13/2023    HGBA1C 6.1 (H) 07/09/2022    CALCIUM 9.7 01/06/2025    FOLATE >20.0 (H) 05/23/2019    MG 1.7 (L) 01/06/2025       Anthropometric Measurements:   Ht Readings from Last 1 Encounters:   01/08/25 5' 5.98\" (1.676 m)     -Weight History:   Wt Readings from Last 15 Encounters:   01/08/25 76.5 kg (168 lb 10.4 oz)   12/31/24 82.6 kg (182 lb)   12/20/24 82.1 kg (181 lb)   12/19/24 84.4 kg (186 lb)   12/06/24 84.4 kg (186 lb)   11/26/24 82.2 kg (181 lb 4 oz)   10/29/24 86.2 kg (190 lb)   10/24/24 86.6 kg (190 lb 14.7 oz)   10/21/24 86.6 kg (190 lb 14.7 oz)   10/15/24 88.5 kg (195 lb)   10/14/24 87.1 kg (192 lb)   10/10/24 89.3 kg (196 lb 13.9 oz)   10/09/24 89.4 kg (197 lb 1.5 oz)   10/07/24 87 kg (191 lb 12.8 oz)   10/02/24 88.9 kg (195 lb 15.8 oz)     Estimated body mass index is 27.23 kg/m² as calculated from the following:    Height as of an earlier encounter on 1/8/25: 5' 5.98\" (1.676 m).    Weight as of an earlier encounter on 1/8/25: 76.5 kg (168 lb 10.4 oz).        "

## 2025-01-08 NOTE — PROGRESS NOTES
Received STAT referral from Hanane Dihel RN on 1/8/2025 for weight loss & tube feeding. Pt is known to COLLEEN Huynh who is currently out of office. This RD attempted to contact patient 3+ times to coordinate follow ups while Elvie is out and was unable to reach patient. I discussed with Hanane who reports pt is lcurrently getting infusion at [place] and will ask patient to call me. I provided her with my contact information.

## 2025-01-08 NOTE — PROGRESS NOTES
"Maira Moura  tolerated Folfiri + Hydration + mag well with no complications. Of note, patient weight was noted to be about 15 lbs lower than previous weights. Pt reports \"having 2 ensures per day\" and \"4 syringes of water + 1 bottle of water\" every day for hydration. Pt reports nasuea, cramping, and indigestion sometimes when she feeds herself through her G Tube. Educated patient on using prescribed zofran around feeding times to reduce symptoms, as well as splitting up feeding into smaller quantities. Hanane coffey notified of patient's weight loss and reported intake, and Dietician was notified. Contact info of [facility] given to patient and she called while receiving treatment.    Maira Moura is aware of future appt on Friday Mahesh 10, 2025 1:00 PM.     AVS printed and given to Maira Moura.      "

## 2025-01-09 ENCOUNTER — OFFICE VISIT (OUTPATIENT)
Dept: SPEECH THERAPY | Facility: CLINIC | Age: 70
End: 2025-01-09
Payer: MEDICARE

## 2025-01-09 DIAGNOSIS — Z90.89 STATUS POST TONSILLECTOMY: ICD-10-CM

## 2025-01-09 DIAGNOSIS — R13.12 DYSPHAGIA, OROPHARYNGEAL PHASE: Primary | ICD-10-CM

## 2025-01-09 DIAGNOSIS — C09.9 RIGHT TONSILLAR SQUAMOUS CELL CARCINOMA (HCC): ICD-10-CM

## 2025-01-09 PROCEDURE — 92526 ORAL FUNCTION THERAPY: CPT | Performed by: SPEECH-LANGUAGE PATHOLOGIST

## 2025-01-10 ENCOUNTER — PATIENT OUTREACH (OUTPATIENT)
Dept: HEMATOLOGY ONCOLOGY | Facility: CLINIC | Age: 70
End: 2025-01-10

## 2025-01-10 ENCOUNTER — HOSPITAL ENCOUNTER (OUTPATIENT)
Dept: INFUSION CENTER | Facility: CLINIC | Age: 70
End: 2025-01-10
Payer: MEDICARE

## 2025-01-10 ENCOUNTER — OFFICE VISIT (OUTPATIENT)
Dept: PALLIATIVE MEDICINE | Facility: CLINIC | Age: 70
End: 2025-01-10

## 2025-01-10 VITALS
SYSTOLIC BLOOD PRESSURE: 110 MMHG | OXYGEN SATURATION: 95 % | BODY MASS INDEX: 27.45 KG/M2 | HEART RATE: 92 BPM | TEMPERATURE: 97.5 F | DIASTOLIC BLOOD PRESSURE: 60 MMHG | RESPIRATION RATE: 16 BRPM | WEIGHT: 170 LBS

## 2025-01-10 VITALS
TEMPERATURE: 97.1 F | SYSTOLIC BLOOD PRESSURE: 137 MMHG | RESPIRATION RATE: 18 BRPM | DIASTOLIC BLOOD PRESSURE: 83 MMHG | HEART RATE: 105 BPM

## 2025-01-10 DIAGNOSIS — B37.0: Primary | ICD-10-CM

## 2025-01-10 DIAGNOSIS — B37.81: Primary | ICD-10-CM

## 2025-01-10 DIAGNOSIS — C78.7 METASTATIC COLON CANCER TO LIVER (HCC): Primary | ICD-10-CM

## 2025-01-10 DIAGNOSIS — C78.7 METASTATIC COLON CANCER TO LIVER (HCC): ICD-10-CM

## 2025-01-10 DIAGNOSIS — C18.9 METASTATIC COLON CANCER TO LIVER (HCC): Primary | ICD-10-CM

## 2025-01-10 DIAGNOSIS — G89.3 CANCER RELATED PAIN: ICD-10-CM

## 2025-01-10 DIAGNOSIS — Z17.0 MALIGNANT NEOPLASM OF UPPER-OUTER QUADRANT OF RIGHT BREAST IN FEMALE, ESTROGEN RECEPTOR POSITIVE (HCC): ICD-10-CM

## 2025-01-10 DIAGNOSIS — C18.9 METASTATIC COLON CANCER TO LIVER (HCC): ICD-10-CM

## 2025-01-10 DIAGNOSIS — C50.411 MALIGNANT NEOPLASM OF UPPER-OUTER QUADRANT OF RIGHT BREAST IN FEMALE, ESTROGEN RECEPTOR POSITIVE (HCC): ICD-10-CM

## 2025-01-10 DIAGNOSIS — C09.9 RIGHT TONSILLAR SQUAMOUS CELL CARCINOMA (HCC): ICD-10-CM

## 2025-01-10 LAB
ALBUMIN SERPL BCG-MCNC: 3.4 G/DL (ref 3.5–5)
ALP SERPL-CCNC: 82 U/L (ref 34–104)
ALT SERPL W P-5'-P-CCNC: 9 U/L (ref 7–52)
ANION GAP SERPL CALCULATED.3IONS-SCNC: 6 MMOL/L (ref 4–13)
AST SERPL W P-5'-P-CCNC: 35 U/L (ref 13–39)
BASOPHILS # BLD AUTO: 0.02 THOUSANDS/ΜL (ref 0–0.1)
BASOPHILS NFR BLD AUTO: 0 % (ref 0–1)
BILIRUB SERPL-MCNC: 0.47 MG/DL (ref 0.2–1)
BUN SERPL-MCNC: 22 MG/DL (ref 5–25)
CALCIUM ALBUM COR SERPL-MCNC: 10 MG/DL (ref 8.3–10.1)
CALCIUM SERPL-MCNC: 9.5 MG/DL (ref 8.4–10.2)
CHLORIDE SERPL-SCNC: 106 MMOL/L (ref 96–108)
CO2 SERPL-SCNC: 30 MMOL/L (ref 21–32)
CREAT SERPL-MCNC: 1.03 MG/DL (ref 0.6–1.3)
EOSINOPHIL # BLD AUTO: 0.08 THOUSAND/ΜL (ref 0–0.61)
EOSINOPHIL NFR BLD AUTO: 1 % (ref 0–6)
ERYTHROCYTE [DISTWIDTH] IN BLOOD BY AUTOMATED COUNT: 15.9 % (ref 11.6–15.1)
GFR SERPL CREATININE-BSD FRML MDRD: 55 ML/MIN/1.73SQ M
GLUCOSE SERPL-MCNC: 111 MG/DL (ref 65–140)
HCT VFR BLD AUTO: 25.5 % (ref 34.8–46.1)
HGB BLD-MCNC: 8.2 G/DL (ref 11.5–15.4)
IMM GRANULOCYTES # BLD AUTO: 0.03 THOUSAND/UL (ref 0–0.2)
IMM GRANULOCYTES NFR BLD AUTO: 1 % (ref 0–2)
LYMPHOCYTES # BLD AUTO: 0.52 THOUSANDS/ΜL (ref 0.6–4.47)
LYMPHOCYTES NFR BLD AUTO: 9 % (ref 14–44)
MAGNESIUM SERPL-MCNC: 1.7 MG/DL (ref 1.9–2.7)
MCH RBC QN AUTO: 30.8 PG (ref 26.8–34.3)
MCHC RBC AUTO-ENTMCNC: 32.2 G/DL (ref 31.4–37.4)
MCV RBC AUTO: 96 FL (ref 82–98)
MONOCYTES # BLD AUTO: 0.29 THOUSAND/ΜL (ref 0.17–1.22)
MONOCYTES NFR BLD AUTO: 5 % (ref 4–12)
NEUTROPHILS # BLD AUTO: 4.95 THOUSANDS/ΜL (ref 1.85–7.62)
NEUTS SEG NFR BLD AUTO: 84 % (ref 43–75)
NRBC BLD AUTO-RTO: 0 /100 WBCS
PLATELET # BLD AUTO: 265 THOUSANDS/UL (ref 149–390)
PMV BLD AUTO: 10.5 FL (ref 8.9–12.7)
POTASSIUM SERPL-SCNC: 3.6 MMOL/L (ref 3.5–5.3)
PROT SERPL-MCNC: 7.6 G/DL (ref 6.4–8.4)
RBC # BLD AUTO: 2.66 MILLION/UL (ref 3.81–5.12)
SODIUM SERPL-SCNC: 142 MMOL/L (ref 135–147)
WBC # BLD AUTO: 5.89 THOUSAND/UL (ref 4.31–10.16)

## 2025-01-10 PROCEDURE — 83735 ASSAY OF MAGNESIUM: CPT | Performed by: INTERNAL MEDICINE

## 2025-01-10 PROCEDURE — 85025 COMPLETE CBC W/AUTO DIFF WBC: CPT | Performed by: INTERNAL MEDICINE

## 2025-01-10 PROCEDURE — 96365 THER/PROPH/DIAG IV INF INIT: CPT

## 2025-01-10 PROCEDURE — 80053 COMPREHEN METABOLIC PANEL: CPT | Performed by: INTERNAL MEDICINE

## 2025-01-10 RX ORDER — NYSTATIN 100000 [USP'U]/ML
500000 SUSPENSION ORAL 4 TIMES DAILY
Qty: 473 ML | Refills: 0 | Status: SHIPPED | OUTPATIENT
Start: 2025-01-10 | End: 2025-01-17

## 2025-01-10 RX ADMIN — MAGNESIUM SULFATE HEPTAHYDRATE: 500 INJECTION, SOLUTION INTRAMUSCULAR; INTRAVENOUS at 13:11

## 2025-01-10 NOTE — PROGRESS NOTES
Name: Maira Moura      : 1955      MRN: [id number]  Encounter Provider: Ariadne Fontana MD  Encounter Date: 1/10/2025   Encounter department: [facility] PALLI[facility] [place]  :  Assessment & Plan  Candidiasis of mouth and esophagus  (HCC)  Mild on the tongue, no ulcers noted, has increased pain with swallowing lately  Start nystatin 500,000u (5mL) 4x a day, swish for 2 minutes before swallowing for 7 days    Orders:    nystatin (MYCOSTATIN) 500,000 units/5 mL suspension; Apply 5 mL (500,000 Units total) to the mouth or throat 4 (four) times a day for 7 days    Metastatic colon cancer to liver (HCC)  Has evidence of disease progression in the liver on most recent imaging in 2024  She is recently switched to FOLFIRI on 2025. So far tolerated 1st dose ok       Right tonsillar squamous cell carcinoma (HCC)  S/p ChemoRT  Latest PET-CT on 2024 showed Interval near complete metabolic response to therapy of previously noted hypermetabolic malignancy/metastatic disease involving the neck.  Has SLP therapy       Cancer related pain  In the neck/throat area, well controlled and tolerated with the ff -   Continue tylenol 160mg/5mL, 20mL (640mg) q6H prn - taking only twice a day. Max of 3000mg in 24H  Continue oxycodone 5mg/5mL, 5mL q6H prn - taking 4 a day. No need for fill today  No issues with OIC, she gets more diarrhea with the Jevity TF       Follow up   RTO in 3 months, call sooner to be seen sooner in the office if needed    Decisional apparatus: Patient is competent on my exam today. If competence is lost, patient's substitute decision maker would default to children by [place] Act 169.     PDMP Review: I have reviewed the patient's controlled substance dispensing history in the Prescription Drug Monitoring Program in compliance with the [place] regulations before prescribing any controlled substances.        Oxycodone Hcl 5 Mg/5 Ml Soln  473.0024Ni [id number] h  [phone] MMEComm InsPA10/21/202410/21/20241  Oxycodone Hcl 5 Mg/5 Ml Soln  473.0023Ni [id number] h [phone] MMEComm InsPA10/09/202410/09/20241  Oxycodone Hcl 5 Mg/5 Ml Soln  100.005Da [id number] [phone] MMEComm InsPA03/15/202403/15/33770  Oxycodone Hcl (Ir) 5 Mg Tablet  8.004Ja [id number] [phone] MMEMedicarePA       History of Present Illness   Maira Moura is a 69 y.o. 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 She is now s/p chemo and RT for the neck. But now on FOLFIRI since 1/9/2025 due to increased liver mets from colon cancer.    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 Her case was presented at the Multidisciplinary Head and Neck Tumor Board on August 12th, 2024, which culminated in consensus for re-evaluation by Otolaryngology and Radiation Oncology. Patient was recommended definitive chemoradiation. She has was initiated on concurrently chemoradiation with weekly Cisplatin (Dose-reduced from Cisplatin 40 mg/m2 to 30 mg/m2 beginning with Cycle 4, due to poor-tolerance), beginning on September 17th, 2024 (Cycle 1). Patient has since completed six-cycles, thus far (Cycle 6 Day 1 was administered on October 21st, 2024). Cycle 7 of Cisplatin was cancelled, given poor-tolerance of the above-mentioned. Patient is s/p completion of radiation November 6th, 2024. Of note, patient's Oncologic course has been complicated  by interval tonsillectomy on October 9th, 2024, resulting in intractable pharyngeal pain in the setting of ongoing chemoradiation. She is s/p IR PEG on 10/1/2024. She was prescribed Oxycodone 5 mg Per Tube Q6HR PRN; of which has not appropriately managed the above-mentioned. Latest PET-CT on 12/27/2024 showed Interval near complete metabolic response to therapy of previously noted hypermetabolic malignancy/metastatic disease involving the neck.    Regarding early-stage hormone-positive right breast cancer, patient is status-post right lumpectomy with sentinel lymph node biopsy on December 20th, 2018, followed by adjuvant radiation (Completed in April 2019). She remains on adjuvant endocrine-therapy with Anastrozole, since February 2019.     Additionally, patient was diagnosed with de naveed metastatic adenocarcinoma of the mid-ascending colon with biopsy-proven oligometastatic disease to the liver in July 2023. Patient is status-post systemic-chemoimmunotherapy, followed by right hemicolectomy due to partial-bowel obstruction in March 2024, followed by resumption of systemic-therapy as well as cryoablation of hepatic metastasis in June 2024. Additional systemic-therapy for colon cancer was held in the absence of residual disease; and in favoring definitive treatment of right tonsillar cancer. Latest PET-CT on 12/27/2024 showed Interval progression of hypermetabolic malignancy/metastatic disease involving the liver. She is now started on FOLFIRI q2 weekly, started on 1/9/2025.    INTERVAL HISTORY:  She is seen today in the office. Since last visit, she did have increased cancer in the liver and so is started on FOLFIRI. Denies any new pain but did note somewhat increased pain with swallowing the last few days. Thinks its because SLP has been doing some exercises on her but she also has thrush. No ulcers noted. I recommended nystatin as above. Her chronic pain with swallowing/neck area is well controlled with 4 doses on  average of oxycodone. No issues with constipation. No other issues. Doing well otherwise.     Social History     Tobacco Use    Smoking status: Never     Passive exposure: Never    Smokeless tobacco: Never    Tobacco comments:     NO TOBACCO USE   Vaping Use    Vaping status: Never Used   Substance and Sexual Activity    Alcohol use: Never    Drug use: Never    Sexual activity: Not on file        Objective   /60 (BP Location: Right arm, Patient Position: Sitting, Cuff Size: Standard)   Pulse 92   Temp 97.5 °F (36.4 °C) (Temporal)   Resp 16   Wt 77.1 kg (170 lb)   SpO2 95%   BMI 27.45 kg/m²     Physical Exam  Constitutional:       General: She is not in acute distress.     Appearance: Normal appearance. She is ill-appearing. She is not toxic-appearing or diaphoretic.      Comments: Comfortable, pleasant, stable.    HENT:      Head: Normocephalic and atraumatic.      Mouth/Throat:      Comments: No ulcers noted. Has white plaques on tongue, nowhere else.   Eyes:      General: No scleral icterus.        Right eye: No discharge.         Left eye: No discharge.      Conjunctiva/sclera: Conjunctivae normal.   Pulmonary:      Effort: Pulmonary effort is normal. No respiratory distress.   Skin:     Coloration: Skin is not jaundiced or pale.   Neurological:      Mental Status: She is alert and oriented to person, place, and time.   Psychiatric:         Mood and Affect: Mood normal.         Behavior: Behavior normal.         Thought Content: Thought content normal.         Judgment: Judgment normal.       Recent labs:  Lab Results   Component Value Date/Time    SODIUM 141 01/06/2025 12:04 PM    SODIUM 136 02/20/2024 05:15 PM    K 3.4 (L) 01/06/2025 12:04 PM    K 4.0 02/20/2024 05:15 PM    BUN 18 01/06/2025 12:04 PM    BUN 12 02/20/2024 05:15 PM    CREATININE 1.07 01/06/2025 12:04 PM    CREATININE 0.97 02/20/2024 05:15 PM    GLUC 99 01/06/2025 12:04 PM    GLUC 114 (H) 02/20/2024 05:15 PM    CALCIUM 9.7 01/06/2025  12:04 PM    CALCIUM 9.6 02/20/2024 05:15 PM    AST 35 01/06/2025 12:04 PM    AST 83 (H) 02/20/2024 05:15 PM    ALT 8 01/06/2025 12:04 PM    ALT 50 02/20/2024 05:15 PM    ALB 3.7 01/06/2025 12:04 PM    ALB 3.5 02/20/2024 05:15 PM    TP 7.6 01/06/2025 12:04 PM    TP 7.6 02/20/2024 05:15 PM    EGFR 53 01/06/2025 12:04 PM    EGFR 64 02/20/2024 05:15 PM     Lab Results   Component Value Date/Time    HGB 8.2 (L) 01/10/2025 01:10 PM    WBC 5.89 01/10/2025 01:10 PM     01/10/2025 01:10 PM    INR 1.09 06/03/2024 08:26 AM    PTT 31 02/21/2024 01:43 PM     Lab Results   Component Value Date/Time    BLN1HTJJCBTM 1.722 07/29/2024 03:03 PM       Recent Imaging:  Procedure: NM PET CT skull base to mid thigh  Result Date: 12/27/2024  Narrative: WHOLE-BODY PET/CT SCAN INDICATION: C09.9: Malignant neoplasm of tonsil, unspecified, restaging. History of colon and breast cancer. MODIFIER: PS COMPARISON: PET/CT dated 8/5/2024, CT of chest, abdomen, and pelvis dated 12/3/2024, and CT of neck dated 10/29/2024 CELL TYPE: Invasive squamous cell carcinoma TECHNIQUE: Following the intravenous administration of 10.9 mCi F-18-FDG, dedicated head and neck images were obtained from the skull vertex to the thoracic inlet with the patient's arms at their side 60 minutes post injection. Subsequently, whole body images were obtained from the thoracic inlet through the proximal femurs with the patients arms above their head.  Multiplanar attenuation corrected and non attenuation corrected PET images are available for interpretation. Contiguous, low dose, axial CT sections were also obtained from the head through the thoracic inlet and from the thoracic inlet through the proximal femurs. Intravenous contrast material was not utilized.  Additional coronal and sagittal images are available as well as fused PET/CT images.  This examination, like all CT scans performed in the [facility], was performed utilizing techniques to minimize  radiation dose exposure, including the use of iterative reconstruction and automated exposure control. Fasting serum glucose: 89 mg/dl FINDINGS: VISUALIZED BRAIN: No acute abnormalities are seen. HEAD/NECK: Variable low-level FDG activity associated with right greater than left cervical lymphadenopathy which is poorly characterized on low-dose unenhanced CT and appears improved since prior PET/CT; for example, max SUV in region of previously noted hypermetabolic right level 2 cervical lymph node is 3.5 on PET image 38, previously 5.1. On current scan there is fairly symmetric nonspecific FDG activity involving Waldeyer's ring including the bilateral glossotonsillar regions which appears to be within range of normal variation with subtle underlying hypermetabolic malignancy not excluded. CT images: Intracranial atherosclerotic calcification is noted. Multinodular goiter. CHEST: No FDG avid soft tissue lesions are seen. CT images: Right-sided chest port extends towards the distal superior vena cava. Atherosclerotic vascular calcifications including those of the coronary arteries are noted. Mild subpleural interstitial thickening/scarring involving the anterior right lung. On image 43 of series 3 there is a 3 mm superior right lower lobe lung nodule which appears new since 8/5/2024 and is stable since most recent CT dated 12/3/2024. ABDOMEN: Interval progression of large confluent hypermetabolic inferior right hepatic lobe metastatic disease/malignancy which is difficult to characterize on low-dose unenhanced CT and was better characterized on recent CT dated 12/3/2024. For example, dominant  hypodense component on image 94 of series 3 measures 8.5 cm in maximal dimension with max SUV of approximately 17.0, previously approximately 3.4 cm with max SUV of 11.0. Extension to involve the adjacent perihepatic region where there is fluid density extending inferiorly into the right paracolic gutter as well as towards  Morison's pouch/anterior right kidney cannot be excluded. Previously noted focus of FDG activity involving the anterior right hepatic dome has metabolically resolved. CT images: Cholelithiasis. Percutaneous gastrostomy tube extends towards the proximal duodenum. Patient's known hypodense hepatic lesions were better characterized on prior contrast-enhanced CT. Atherosclerotic vascular calcifications are noted. PELVIS: On image 171 of series 3 again noted is low-level activity associated with essentially stable in size large right adnexal mass which was better characterized on recent contrast-enhanced CT measuring approximately 8.0 cm on image 171 of series 3 with max SUV of approximately 3.1 although it is difficult to separate low-level activity within this large right adnexal mass from adjacent bowel activity. Nonspecific similar in appearance low-level activity about the cervix/lower uterine segment in this patient who had heterogeneous enlargement along the left side of the cervix seen on recent contrast-enhanced CT; this finding is of uncertain clinical significance. CT images: Unremarkable. OSSEOUS STRUCTURES: No FDG avid lesions are  seen. CT images: Degenerative changes are noted involving the spine.     Impression: 1. Interval progression of hypermetabolic malignancy/metastatic disease involving the liver. 2. Interval near complete metabolic response to therapy of previously noted hypermetabolic malignancy/metastatic disease involving the neck. 3. Essentially stable large right adnexal mass with low-level associated FDG activity. 4. 3 mm right lung nodule, new since prior PET/CT. Short interval follow-up with CT of chest in 3 months is recommended to ensure stability and exclude developing pulmonary malignancy/metastatic disease. Please see above for details and additional findings. The study was marked in EPIC for significant notification. The study was marked in Epic for follow-up. Workstation performed:  [id number]     Procedure: CT chest abdomen pelvis w contrast  Result Date: 12/6/2024  Narrative: CT CHEST, ABDOMEN AND PELVIS WITH IV CONTRAST INDICATION: C18.9: Malignant neoplasm of colon, unspecified C78.7: Secondary malignant neoplasm of liver and intrahepatic bile duct. COMPARISON: PET/CT 8/5/2024 and CT AP 6/6/2024 and MRI pelvis 7/6/2023 TECHNIQUE: CT examination of the chest, abdomen and pelvis was performed. Multiplanar 2D reformatted images were created from the source data. This examination, like all CT scans performed in the [facility], was performed utilizing techniques to minimize radiation dose exposure, including the use of iterative reconstruction and automated exposure control. Radiation dose length product (DLP) for this visit: 623.15 mGy-cm IV Contrast: 99 mL of iohexol (OMNIPAQUE) Enteric Contrast: Not administered. FINDINGS: CHEST LUNGS: There are mild subpleural reticular changes within the anterior right upper an right middle lobes, secondary to prior therapy to the right breast. The lungs are otherwise clear, without lobar consolidation, interstitial lung disease or convincing endobronchial lesions. PLEURA: Unremarkable. HEART/GREAT VESSELS: The heart is not grossly enlarged, with moderate multi-vessel coronary artery calcifications. The pericardium is unremarkable. No thoracic aortic aneurysm. MEDIASTINUM AND STEFANIE: Unremarkable. CHEST WALL AND LOWER NECK: Right anterior chest wall port, with the tip terminating in the distal SVC. ABDOMEN LIVER/BILIARY TREE: Multiple hepatic lesions (series 2): Image 81, segment 8, 1.4 x 1.9 cm; previously 1.3 x 1.6 cm-allowing for ill-defined margins. Image 93, segment 3; 1.6 x 2.0 cm, previously 1.9 x 2.4 cm. Image 108, segment 6, 5.9 x 7.2 cm, previously 6.1 x 4.0 cm. This now extends along the posterior inferior capsule of the liver, with a new complex cystic and solid capsular/lesion measuring 9.4 x 9.8 x 3.8 cm. There was previously  more subtle pericapsular fluid within this region. No biliary dilation. GALLBLADDER: Large gallstone, without apparent inflammation. SPLEEN: Unremarkable. PANCREAS: Unremarkable. ADRENAL GLANDS: Unremarkable. KIDNEYS/URETERS: Unremarkable. No hydronephrosis. STOMACH AND BOWEL: A percutaneous gastrostomy tube is positioned within the gastric lumen. Small duodenal diverticulum. The small bowel loops are otherwise unremarkable. There are scattered sigmoid diverticula, without inflammation. Prior right hemicolectomy and reanastomosis. APPENDIX: Surgically absent. ABDOMINOPELVIC CAVITY: No generalized ascites. No pneumoperitoneum VESSELS: Unremarkable for patient's age. PELVIS REPRODUCTIVE ORGANS: There is a large lobulated, approximately 7.8 x 5.7 cm right adnexal lesion, corresponding with a uterine myoma from the prior MRI in 2023. There is also somewhat heterogeneous enlargement along the left side of the cervix, measuring  4.3 x 5.0 cm. This was not present on the prior MRI. URINARY BLADDER: Unremarkable. ABDOMINAL WALL/INGUINAL REGIONS: Postsurgical changes of the anterior abdominal wall. BONES: No acute fracture or suspicious osseous lesion.     Impression: 1.  Mixed interval changes in the known hepatic metastases. While the segment 8 and segment 3 lesions appear slightly smaller on the current study the segment 6 lesion has clearly enlarged, with trans-capsular extension. There is a capsular cystic and solid component/implant which is new since the prior study. 2.  New nonspecific enlargement along the left cervical region. While possibly due to volume averaging, a primary cervical mass or drop metastasis cannot be excluded. Further evaluation by gadolinium-enhanced MRI of the female pelvis is recommended. The study was marked in EPIC for significant notification. Workstation performed: [id number]     Procedure: XR chest pa and lateral  Result Date: 10/30/2024  Narrative: XR CHEST PA AND LATERAL INDICATION:  infectious work up, r/o aspiration. COMPARISON: PET/CT 8/5/2024, CXR 2/21/2024. FINDINGS: Right port in mid SVC. Clear lungs. No pneumothorax or pleural effusion. Normal cardiomediastinal silhouette. Bones are unremarkable for age. Normal upper abdomen.     Impression: No acute cardiopulmonary disease. Workstation performed: [id number]     Procedure: CT soft tissue neck with contrast  Result Date: 10/29/2024  Narrative: CT NECK WITH CONTRAST INDICATION:   Head and neck cancer, left neck pain. COMPARISON: PET/CT from August 5, 2024 and CT neck from March 30, 2023 TECHNIQUE:  Axial, sagittal, and coronal 2D reformatted images were created from the axial source data and submitted for interpretation. Radiation dose length product (DLP) for this visit:  508.76 mGy-cm .  This examination, like all CT scans performed [facility] the [facility], was performed utilizing techniques to minimize radiation dose exposure, including the use of iterative  reconstruction and automated exposure control. IV Contrast:  85 mL of iohexol (OMNIPAQUE) IMAGE QUALITY:  Diagnostic. FINDINGS: VISUALIZED BRAIN PARENCHYMA:  No acute intracranial pathology of the visualized brain parenchyma. VISUALIZED ORBITS: Normal visualized orbits. PARANASAL SINUSES: Right sphenoid sinus and bilateral ethmoid sinus mucosal disease. Trace right mastoid fluid without coalescence. NASAL CAVITY AND NASOPHARYNX: No contour deforming lesions identified. Mildly leftward deviated nasal septum. SUPRAHYOID/INFRAHYOID NECK: Post radiation changes with induration in the subcutaneous fat and effacement of the oropharyngeal and supraglottic laryngeal fat planes. No evidence for recurrent or residual right tonsillar mass. No other contour deforming primary aerodigestive tract lesions. THYROID GLAND: Right thyroid nodules, similar to prior CT from 3/30/2023, and better evaluated by ultrasound PAROTID AND SUBMANDIBULAR GLANDS: Mildly atrophic submandibular gland  suggesting prior radiation therapy. Normal parotid glands. LYMPH NODES: Ill-defined treated right level 2 claudio metastasis (series 2 image 43) measuring 1.8 x 1.8 cm. On the initial exam from 3/30/2023, this measured 3.7 x 2.2 cm, and is difficult to measure on recent PET/CT. 1.3 x 0.7 cm right level 3 claudio metastasis (series 2 image 49) grossly stable compared to recent PET/CT. No new adenopathy. Scattered subcentimeter shotty left cervical lymph nodes are similar to prior exam. VASCULAR STRUCTURES:  Normal enhancement of the cervical vasculature. THORACIC INLET: Right chest Port-A-Cath. BONY STRUCTURES: No acute fracture or destructive osseous lesion.     Impression: Posttreatment changes as described without evidence for disease progression compared to PET/CT from 8/5/2024. No acute abnormality. Workstation performed: [id number]     Procedure: IR gastrostomy tube placement  Result Date: 10/2/2024  Narrative: PROCEDURE: Gastrostomy tube placement Procedural Personnel Attending physician(s): Dr. Mosher Pre-procedure diagnosis: Right tonsillar squamous cell carcinoma Post-procedure diagnosis: Same Indication: Patient with right tonsillar squamous cell carcinoma and dysphagia. PROCEDURE SUMMARY: - Gastrostomy tube placement under fluoroscopic guidance PROCEDURE DETAILS: Pre-procedure Consent: Informed consent for the procedure including risks, benefits and alternatives was obtained and time-out was performed prior to the procedure. Preparation: The site was prepared and draped using maximal sterile barrier technique including cutaneous antisepsis. Anesthesia/sedation Level of anesthesia/sedation: Monitored anesthesia care Anesthesia/sedation administered by: Anesthesiology Total intra-service sedation time (minutes): 60 Gastrostomy tube placement The patient was positioned supine. Nasogastric tube was placed by anesthesiology. Following administration of 1 mg glucagon IV, the stomach was inflated with air through the NG  tube. 3 T-tacks were placed to secure the body of stomach up against the abdominal wall. 1 cm incision was made at the center of the T-tacks. 19-gauge needle was advanced into the gastric lumen through the incision and a 0.035 inch wire advanced through the needle into the gastric lumen. Tract was serially dilated using telescoping peel-away sheath. 18 [place]an gastrostomy tube was advanced through the peel-away sheath, followed by sheath removal. G-tube balloon was inflated using 8 mL sterile water. Contrast was injected through the gastrostomy tube to confirm proper positioning. Contrast Contrast agent: Omnipaque Contrast volume (mL): 10 Radiation Dose Fluoroscopy time (minutes): 6.1 Reference air kerma (mGy): 104 Additional Details Estimated blood loss (mL): None Complications: No immediate complications.     Impression: 18 [place]an gastrostomy tube placement. Plan: Return in 6 months for routine tube exchange. Workstation performed: [id number]     I have spent a total time of 20 minutes in caring for this patient on the day of the visit/encounter including Diagnostic results, Prognosis, Risks and benefits of tx options, Instructions for management, Patient and family education, Importance of tx compliance, Risk factor reductions, Impressions, Counseling / Coordination of care, Documenting in the medical record, Reviewing / ordering tests, medicine, procedures  , and Obtaining or reviewing history  . Topics discussed with the patient / family include symptom assessment and management, medication review, medication adjustment, goals of care, opioid titration, supportive listening, and anticipatory guidance.     Ariadne Fontana MD  Palliative Medicine & Supportive Care  Internal Medicine  Available via [employer]  Office: [phone]  Fax: [phone]

## 2025-01-10 NOTE — PROGRESS NOTES
All appointments for pts speech therapy are scheduled via round trip per assistance of Laurel Sheridan.  Called pt to inform her that transportation has been scheduled, No answer,left a detailed message with my contact information

## 2025-01-10 NOTE — PROGRESS NOTES
Pt presents for CADD d/c, labs, and hydration. 5FU elastometric ball appears fully deflated, 46 hour run time completed. Disconnected per protocol. Labs collected via port. Pt tolerated IV hydration without incident. Pt provided with AVS, aware of next appt 1/13 at 10:30am.

## 2025-01-10 NOTE — ASSESSMENT & PLAN NOTE
Has evidence of disease progression in the liver on most recent imaging in 12/2024  She is recently switched to FOLFIRI on 1/9/2025. So far tolerated 1st dose ok

## 2025-01-10 NOTE — ASSESSMENT & PLAN NOTE
In the neck/throat area, well controlled and tolerated with the ff -   Continue tylenol 160mg/5mL, 20mL (640mg) q6H prn - taking only twice a day. Max of 3000mg in 24H  Continue oxycodone 5mg/5mL, 5mL q6H prn - taking 4 a day. No need for fill today  No issues with OIC, she gets more diarrhea with the Jevity TF

## 2025-01-10 NOTE — ASSESSMENT & PLAN NOTE
S/p ChemoRT  Latest PET-CT on 12/27/2024 showed Interval near complete metabolic response to therapy of previously noted hypermetabolic malignancy/metastatic disease involving the neck.  Has SLP therapy

## 2025-01-12 ENCOUNTER — DOCUMENTATION (OUTPATIENT)
Dept: HEMATOLOGY ONCOLOGY | Facility: CLINIC | Age: 70
End: 2025-01-12

## 2025-01-13 ENCOUNTER — HOSPITAL ENCOUNTER (OUTPATIENT)
Dept: INFUSION CENTER | Facility: CLINIC | Age: 70
Discharge: HOME | End: 2025-01-13
Payer: MEDICARE

## 2025-01-13 ENCOUNTER — HOSPITAL ENCOUNTER (OUTPATIENT)
Dept: RADIOLOGY | Facility: HOSPITAL | Age: 70
Discharge: HOME | End: 2025-01-13
Attending: INTERNAL MEDICINE
Payer: MEDICARE

## 2025-01-13 ENCOUNTER — PATIENT OUTREACH (OUTPATIENT)
Dept: HEMATOLOGY ONCOLOGY | Facility: CLINIC | Age: 70
End: 2025-01-13

## 2025-01-13 VITALS
HEART RATE: 102 BPM | DIASTOLIC BLOOD PRESSURE: 81 MMHG | RESPIRATION RATE: 18 BRPM | SYSTOLIC BLOOD PRESSURE: 120 MMHG | TEMPERATURE: 97.2 F

## 2025-01-13 DIAGNOSIS — R13.12 DYSPHAGIA, OROPHARYNGEAL PHASE: ICD-10-CM

## 2025-01-13 DIAGNOSIS — C50.411 MALIGNANT NEOPLASM OF UPPER-OUTER QUADRANT OF RIGHT BREAST IN FEMALE, ESTROGEN RECEPTOR POSITIVE (HCC): Primary | ICD-10-CM

## 2025-01-13 DIAGNOSIS — C09.9 RIGHT TONSILLAR SQUAMOUS CELL CARCINOMA (HCC): ICD-10-CM

## 2025-01-13 DIAGNOSIS — Z17.0 MALIGNANT NEOPLASM OF UPPER-OUTER QUADRANT OF RIGHT BREAST IN FEMALE, ESTROGEN RECEPTOR POSITIVE (HCC): Primary | ICD-10-CM

## 2025-01-13 DIAGNOSIS — Z90.89 STATUS POST TONSILLECTOMY: ICD-10-CM

## 2025-01-13 LAB
ALBUMIN SERPL BCG-MCNC: 3.5 G/DL (ref 3.5–5)
ALP SERPL-CCNC: 79 U/L (ref 34–104)
ALT SERPL W P-5'-P-CCNC: 8 U/L (ref 7–52)
ANION GAP SERPL CALCULATED.3IONS-SCNC: 9 MMOL/L (ref 4–13)
AST SERPL W P-5'-P-CCNC: 29 U/L (ref 13–39)
BASOPHILS # BLD AUTO: 0.02 THOUSANDS/ΜL (ref 0–0.1)
BASOPHILS NFR BLD AUTO: 0 % (ref 0–1)
BILIRUB SERPL-MCNC: 0.64 MG/DL (ref 0.2–1)
BUN SERPL-MCNC: 22 MG/DL (ref 5–25)
CALCIUM SERPL-MCNC: 9.3 MG/DL (ref 8.4–10.2)
CHLORIDE SERPL-SCNC: 103 MMOL/L (ref 96–108)
CO2 SERPL-SCNC: 28 MMOL/L (ref 21–32)
CREAT SERPL-MCNC: 0.98 MG/DL (ref 0.6–1.3)
EOSINOPHIL # BLD AUTO: 0.07 THOUSAND/ΜL (ref 0–0.61)
EOSINOPHIL NFR BLD AUTO: 1 % (ref 0–6)
ERYTHROCYTE [DISTWIDTH] IN BLOOD BY AUTOMATED COUNT: 14.6 % (ref 11.6–15.1)
GFR SERPL CREATININE-BSD FRML MDRD: 59 ML/MIN/1.73SQ M
GLUCOSE SERPL-MCNC: 103 MG/DL (ref 65–140)
HCT VFR BLD AUTO: 25 % (ref 34.8–46.1)
HGB BLD-MCNC: 8.1 G/DL (ref 11.5–15.4)
IMM GRANULOCYTES # BLD AUTO: 0.03 THOUSAND/UL (ref 0–0.2)
IMM GRANULOCYTES NFR BLD AUTO: 1 % (ref 0–2)
LYMPHOCYTES # BLD AUTO: 0.41 THOUSANDS/ΜL (ref 0.6–4.47)
LYMPHOCYTES NFR BLD AUTO: 8 % (ref 14–44)
MAGNESIUM SERPL-MCNC: 1.9 MG/DL (ref 1.9–2.7)
MCH RBC QN AUTO: 31.5 PG (ref 26.8–34.3)
MCHC RBC AUTO-ENTMCNC: 32.4 G/DL (ref 31.4–37.4)
MCV RBC AUTO: 97 FL (ref 82–98)
MONOCYTES # BLD AUTO: 0.08 THOUSAND/ΜL (ref 0.17–1.22)
MONOCYTES NFR BLD AUTO: 2 % (ref 4–12)
NEUTROPHILS # BLD AUTO: 4.37 THOUSANDS/ΜL (ref 1.85–7.62)
NEUTS SEG NFR BLD AUTO: 88 % (ref 43–75)
NRBC BLD AUTO-RTO: 0 /100 WBCS
PLATELET # BLD AUTO: 208 THOUSANDS/UL (ref 149–390)
PMV BLD AUTO: 11.1 FL (ref 8.9–12.7)
POTASSIUM SERPL-SCNC: 3.5 MMOL/L (ref 3.5–5.3)
PROT SERPL-MCNC: 7.5 G/DL (ref 6.4–8.4)
RBC # BLD AUTO: 2.57 MILLION/UL (ref 3.81–5.12)
SODIUM SERPL-SCNC: 140 MMOL/L (ref 135–147)
WBC # BLD AUTO: 4.98 THOUSAND/UL (ref 4.31–10.16)

## 2025-01-13 PROCEDURE — 80053 COMPREHEN METABOLIC PANEL: CPT | Performed by: INTERNAL MEDICINE

## 2025-01-13 PROCEDURE — 74230 X-RAY XM SWLNG FUNCJ C+: CPT

## 2025-01-13 PROCEDURE — 83735 ASSAY OF MAGNESIUM: CPT | Performed by: INTERNAL MEDICINE

## 2025-01-13 PROCEDURE — 96365 THER/PROPH/DIAG IV INF INIT: CPT

## 2025-01-13 PROCEDURE — 85025 COMPLETE CBC W/AUTO DIFF WBC: CPT | Performed by: INTERNAL MEDICINE

## 2025-01-13 RX ADMIN — MAGNESIUM SULFATE HEPTAHYDRATE: 500 INJECTION, SOLUTION INTRAMUSCULAR; INTRAVENOUS at 11:28

## 2025-01-13 NOTE — PROGRESS NOTES
Pt presents for magnesium. No new meds or concerns. Pt tolerated treatment without adverse reaction. Future appointments discussed, confirmed with patient for 1/15/25 1000. AVS given.

## 2025-01-13 NOTE — PROCEDURES
Video Swallow Study      Patient Name: Maira Moura  Today's Date: 1/13/2025        Past Medical History  Past Medical History:   Diagnosis Date    Anemia     Arthritis     Body mass index (BMI) 40.0-44.9, adult (HCC) 9/22/2023    Breast cancer (HCC) 12/17/2018    Cancer (HCC)     right breast, colon, liver, right tonsil    Cervical lymphadenopathy 3/21/2023    CT neck on 3/30/2023- Large right level 2A lymphadenopathy as described above and suspicious for neoplasm.  Correlation with histopathology is recommended.  Mild asymmetric prominence of the right palatine tonsil with otherwise no definitive nodular enhancing lesions identified along the course of the aerodigestive tract.     5/26/23- FNA of this node was nonrevealing for tissue etiology, but it d    Encounter for screening for HIV 07/07/2022    Follow-up examination 04/04/2023    GERD (gastroesophageal reflux disease)     Hyperlipidemia     Hypertension     Obesity     Stroke (HCC)     TIA     Stroke (HCC)     TIA     Transaminitis 09/22/2021    Vasomotor rhinitis 8/9/2018    Refilled flonase today. Stopped taking since January 2022        Past Surgical History  Past Surgical History:   Procedure Laterality Date    BREAST BIOPSY Right 11/23/2018    us guided bx cancer    BREAST SURGERY      COLONOSCOPY      ESOPHAGOSCOPY N/A 07/20/2023    Procedure: ESOPHAGOSCOPY;  Surgeon: David Chapa MD;  [facility];  Service: ENT    HEMICOLOECTOMY W/ ANASTOMOSIS Right 3/12/2024    Procedure: RIGHT COLECTOMY WITH ROBOTICS;  Surgeon: Vickie Israel MD;  [facility];  Service: Surgical Oncology    IR BIOPSY LIVER MASS  06/27/2023    IR CRYOABLATION  6/3/2024    IR GASTROSTOMY TUBE PLACEMENT  10/2/2024    IR PORT CHECK  01/03/2024    IR PORT PLACEMENT  07/28/2023    IR PORT STRIPPING  01/09/2024    LAPAROTOMY N/A 3/12/2024    Procedure: LAPAROTOMY EXPLORATORY W/ ROBOTICS;  Surgeon: Vicike Israel MD;   [facility];  Service: Surgical Oncology    VT BIOPSY NASOPHARYNX VISIBLE LESION SIMPLE N/A 10/9/2024    Procedure: NASOPHARYNGOSCOPY with biospy;  Surgeon: Juanito Matthew MD;  [facility]: [facility];  Service: ENT    VT [facility] INCL FLUOR GDNCE DX W/CELL WASHG SPX N/A 2023    Procedure: BRONCHOSCOPY;  Surgeon: David Chapa MD;  [facility];  Service: ENT    VT LARYNGOSCOPY W/BIOPSY MICROSCOPE/TELESCOPE N/A 2023    Procedure: MICRODIRECT LARYNGOSCOPY WITH BIOPSY;  Surgeon: David Chapa MD;  [facility];  Service: ENT    VT LARYNGOSCOPY W/WO TRACHEOSCOPY DX EXCEPT  N/A 10/9/2024    Procedure: DIRECT LARYNGOSCOPY;  Surgeon: Juanito Matthew MD;  [facility];  Service: ENT    VT MASTECTOMY PARTIAL W/AXILLARY LYMPHADENECTOMY Right 2018    Procedure: ULTRASOUND LOCALIZED PARTIAL MASTECTOMY W/SENTINEL NODE BIOPSY POSS AXILLARY DISSECTION;  Surgeon: Zahida Cornoado MD;  Location: [facility];  Service: General    VT TONSILLECTOMY PRIMARY/SECONDARY AGE 12/> N/A 10/9/2024    Procedure: TONSILLECTOMY;  Surgeon: Juanito Matthew MD;  [facility];  Service: ENT    TUBAL LIGATION      US GUIDED BREAST BIOPSY RIGHT COMPLETE Right 2018    US GUIDED INJECTION FOR RESEARCH STUDY  2018    US GUIDED INJECTION FOR RESEARCH STUDY  2018    US GUIDED INJECTION FOR RESEARCH STUDY  2019    US GUIDED LYMPH NODE BIOPSY RIGHT  2023     Summary:  Images are on PACS for review.     Oral Phase : Pt presented with mild oral dysphagia. Did not administer solids or 13 mm pill for safety precaution. Bolus control and formation were WNL. Transfer was mildly weak. Trace posterior base of tongue residue.     Pharyngeal Phase : Pt presented with mod pharyngeal dysphagia. Swallow initiation was inconsistently mildly delayed. Multiple swallows per trial approx 3-5. Premature spill to valleculae with liquids and solids trials. With thin  liquids premature spill to the pyriforms. Hyoid elevation, tongue base retraction, and laryngeal excursion were reduced. Pharyngeal stripping wave was diminished.  Epiglottic inversion was incomplete. With trials of thin, nectar epiglottic undercoat resulted in trace aspiration after initial swallow. Trace to mild vallecular retention with liquids and mild with puree. Pyriform retention trace to mild with all consistencies. CP prominences evident C5-C6, did not appear to impact bolus flow.  Trace aspiration with thin and ntl liquids. Pt did not have cough response to aspiration. Cued cough ineffective in clearing aspirated residue.Chin tuck and  head turn right/left ineffective in reducing trace aspiration and vallecular residue. Effortful swallow and double swallow did reduce vallecular residue minimally.     Per Esophageal screen   Solids cleared adequately.Retropulsion observed with ntl.     Recommendations:  Diet: PEG for primary, dysphagia 1 pureed   Liquids:Thin, continue to monitor for tolerance with overt s/s aspiration (cough/throat clear), WBC, procal, chest xray, and oxygen requirements. The findings of the VBS were explained to the patient. Explained that aspiration of thickened liquids could be silent and the impact on lung function is unknown, however any choice has the potential for aspiration and pneumonia. May prevent/minimize coughing or other reactions to liquid entering the airway and associated complications, may prevent or decrease material form entering the airway. Materials such as liquids entering the lungs may cause pneumonia leading to cough, which may produce mucus, fever, sweating, and chills, shortness of breath, rapid/shallow breathing, sharp or stabbing chest pain, loss of appetite, low energy, and fatigue. If thickened liquids decrease the amount of liquids consumed, dehydration may occur. Dehydration can cause renal failure, constipation, and UTI. Other side effect include slow  "digestion, interfere medication dissolving and absorption, interfere with glucose levels, cause residue in the throat that can enter the airway, may silently aspirate thickened liquids.  The pt indicated she would prefer thin.     Meds:crushed with puree, via PEG   Strategies:slow rate, alternate liquids with solids, swallow prior to additional po, effortful swallow and double swallow.   Upright position  F/u ST tx: YES   Aspiration Precautions  Reflux Precautions  Consider consult with: f/u with Rehab  Results reviewed with: pt,   Aspiration precautions posted.  If a dedicated assessment of the esophagus is desired, consider esophagram/barium swallow or EGD.    Pt is a 69 year old female who was referred by Harika Medina DO. Pt reported she primarily utilizes PEG for nutrition. Stated she will take medications whole with apple sauce.  Also stated oral intake as been very low and primarily for pleasure.       H&P/Admit info, pertinent provider notes/procedures/studies/PMH:(copied and placed in this report)   Patient is a 69 y.o. female who was referred to outpatient skilled Speech Therapy services for a dysphagia evaluation.   Per Radiation Oncology note (12/20/2024), patient \"...with history of right breast carcinoma status post lumpectomy and radiation therapy in 2019.  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  She had cryoablation to the liver on Evelin 3, 2024.  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  She has been receiving treatment with 5-FU leucovorin and Nivolumab.  PET/CT study August 5, 2024 shows progression within the neck as well as the tonsil.  She was discussed with head and neck multidisciplinary case review on August 12, 2024 with recommendation to see Dr. Chapa August 16, 2024 " "and to consider radiation therapy along with chemotherapy.  She had previously been seen by dentistry/oral surgery and had dental extractions on September 1, 2023.  She has had a full upper denture in the last 10 years.  She had extractions of mandibular teeth that were in poor repair with 2 incisors that are remaining.   She was seen for consultation on August 20, 2024 for definitive radiation therapy to the head and neck region over 7 weeks/35 fractions with total dose of 7000 cGy along with concurrent chemotherapy with Dr. Diaz.  She declined upfront PEG tube placement and then developed mucositis such that PEG tube had to be placed during treatment.  She did have a nasopharynx/oropharynx biopsy on October 9, 2024 along with right and left tonsillectomy that were all negative for malignancy. She completed definitive treatment on November 13, 2024...\"  Per Oncology note (12/31/2024), patient \"...with a diagnosis of metastatic colon cancer to the liver and 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  She had a PET scan prior to this visit that shows a significant progression in the liver.  Her tonsil and neck disease is under good control and is showing what appears to be a near complete response.  There is only some residual inflammation after radiation.  She also has a stable low FDG avid adnexal mass.  She is still not able to take a lot of food by mouth because of soreness when she swallows.  She chokes on thin liquids sometimes.  Referral was made for speech therapy but she never got a call.  She has lost 4 pounds since I last saw her.  Anytime she takes any tube feeds or anything through her feeding tube she has diarrhea.  Sometimes she does not make it to the bathroom in time.  She is not taking any Imodium only Pepcid...\"    Code Status: Level 1    Previous VBS: completed at [facility]  Video Swallow Study  Patient Name: Maira Moura  Today's Date: 8/15/2023   Modified (Video) " Barium Swallow Study  Summary:  Images are on PACS for review.   Oral stage: WNL/WFL Mastication was slightly prolonged but functional.  Patient wears upper dentures and has minimal frontal lower natural dentition.  Bolus control and formation were WNL.  Transfer was intermittently piecemeal.  Mild lingual residue.  Pharyngeal stage: WNL for prompt swallow, velar closure, hyoid excursion, laryngeal elevation, pharyngeal constriction, epiglottic inversion.  No/trace pharyngeal retention.  No penetration or aspiration.  Per gross esophageal screen: Mild stasis following solids.  Thin liquids cleared residual.  Pill cleared adequately.  Recommendations:  Diet: Regular as tolerated  Liquids: Thin  Meds: As tolerated  Frequent oral care  Upright position  F/u ST tx: Not at this time.  Please reconsult if issues arise during or following treatment  Reflux Precautions.  History of GERD  Results reviewed with: pt  Repeat MBS as necessary  If a dedicated assessment of the esophagus is desired:  Consider esophagram/barium swallow w/ barium tablet administration   Consider EGD     Functional Oral Intake Scale (FOIS)  Matthew Ochoa PhD, F-GONZALO  (Does not include liquids)  7.Total oral diet with no restriction.     Precautions   Aspiration     Food allergies:  No know    Current diet:  PEG for primary, thin liquids and puree for pleasure    Premorbid diet:  PEG for primary thin liquids and puree.    Dentition  Edentulous    Oral Mech  WNL   O2 requirements:  Room air    Vocal Quality/Speech WNL   Cognitive status:  Alert      Consistencies administered: Barium laden applesauce, honey thick, nectar thick, thin liquids. Liquids were administered by cup and straw.     Pt was seated laterally at 90 degrees.   Pt stood to be viewed in AP position

## 2025-01-13 NOTE — PROGRESS NOTES
.Chart reviewed in preparation of Multidisciplinary Head and Neck Tumor Conference presentation by Dr. Matthew on 1/13/25.  Patient has a history of   breast cancer s/p BCS and whole breast RT in April 2019, with recent diagnosis of synchronous metastatic colon cancer (M1) as well as squamous cell right tonsillar cancer ([id number]).  She completed 6 cycles of FOLFOX with PET/CT demonstrating interval disease progression in the neck.  Her case was presented at the Multidisciplinary Head and Neck Tumor Board on 8/12/24, which culminated in consensus for re-evaluation by Otolaryngology and Radiation Oncology. Patient was recommended definitive chemoradiation. She initiated concurrent chemoradiation with weekly Cisplatin.  12/27/24 PET/CT demonstrated a significant progression in the liver. Her tonsil and neck disease is under good control and is showing what appears to be a near complete response. There is only some residual inflammation after radiation. She also has a stable low FDG avid adnexal mass.

## 2025-01-13 NOTE — PLAN OF CARE
Problem: Knowledge Deficit  Goal: Patient/family/caregiver demonstrates understanding of disease process, treatment plan, medications, and discharge instructions  Description: Complete learning assessment and assess knowledge base.  Interventions:  - Provide teaching at level of understanding  - Provide teaching via preferred learning methods  1/13/2025 1136 by Tosha Campos RN  Outcome: Progressing  1/13/2025 1126 by Tosha Campos, RN  Outcome: Progressing

## 2025-01-14 ENCOUNTER — OFFICE VISIT (OUTPATIENT)
Dept: SPEECH THERAPY | Facility: CLINIC | Age: 70
End: 2025-01-14
Payer: MEDICARE

## 2025-01-14 DIAGNOSIS — R13.12 DYSPHAGIA, OROPHARYNGEAL PHASE: Primary | ICD-10-CM

## 2025-01-14 DIAGNOSIS — C09.9 RIGHT TONSILLAR SQUAMOUS CELL CARCINOMA (HCC): ICD-10-CM

## 2025-01-14 DIAGNOSIS — Z90.89 STATUS POST TONSILLECTOMY: ICD-10-CM

## 2025-01-14 PROCEDURE — 92526 ORAL FUNCTION THERAPY: CPT

## 2025-01-14 NOTE — PROGRESS NOTES
Daily Speech Treatment Note    Today's date: 2025  Patient’s name: Maira oMura  : 1955  MRN: [id number]  Safety measures: HTN, CVA, GERD, active CA, s/p PEG tube placement, aspiration precautions  Current recommended diet: PEG tube for primary nutrition/hydration; puree with thin liquids P.O. as tolerated  Referring provider: Harika Medina DO    Encounter Diagnosis     ICD-10-CM    1. Dysphagia, oropharyngeal phase  R13.12       2. Status post tonsillectomy  Z90.89       3. 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         Visit Tracking:  POC   Expires Auth Expiration Date ST Visit Limit   2025 or 10th visit 2025 BOMN          Visit/Unit Tracking:  Auth Status Date 25  IE 25   Not required Used 1 2 3    Remaining 9 8 7     Subjective/Behavioral: Patient report she is feeling tired    Objective/Assessment: Reviewed results of VBSS with patient today      Video Swallow Study   2025    Oral Phase : Pt presented with mild oral dysphagia. Did not administer solids or 13 mm pill for safety precaution. Bolus control and formation were WNL. Transfer was mildly weak. Trace posterior base of tongue residue.      Pharyngeal Phase : Pt presented with mod pharyngeal dysphagia. Swallow initiation was inconsistently mildly delayed. Multiple swallows per trial approx 3-5. Premature spill to valleculae with liquids and solids trials. With thin liquids premature spill to the pyriforms. Hyoid elevation, tongue base retraction, and laryngeal excursion were reduced. Pharyngeal stripping wave was diminished.  Epiglottic inversion was incomplete. With trials of thin, nectar epiglottic undercoat resulted in trace aspiration after initial swallow. Trace to mild vallecular retention with liquids and mild with puree. Pyriform retention trace to mild with all consistencies. CP prominences evident C5-C6, did not appear to impact bolus flow.  Trace aspiration with thin and  ntl liquids. Pt did not have cough response to aspiration. Cued cough ineffective in clearing aspirated residue.Chin tuck and  head turn right/left ineffective in reducing trace aspiration and vallecular residue. Effortful swallow and double swallow did reduce vallecular residue minimally.      Per Esophageal screen   Solids cleared adequately.Retropulsion observed with ntl.      Recommendations:  Diet: PEG for primary, dysphagia 1 pureed   Liquids:Thin, continue to monitor for tolerance with overt s/s aspiration (cough/throat clear), WBC, procal, chest xray, and oxygen requirements. The findings of the VBS were explained to the patient. Explained that aspiration of thickened liquids could be silent and the impact on lung function is unknown, however any choice has the potential for aspiration and pneumonia. May prevent/minimize coughing or other reactions to liquid entering the airway and associated complications, may prevent or decrease material form entering the airway. Materials such as liquids entering the lungs may cause pneumonia leading to cough, which may produce mucus, fever, sweating, and chills, shortness of breath, rapid/shallow breathing, sharp or stabbing chest pain, loss of appetite, low energy, and fatigue. If thickened liquids decrease the amount of liquids consumed, dehydration may occur. Dehydration can cause renal failure, constipation, and UTI. Other side effect include slow digestion, interfere medication dissolving and absorption, interfere with glucose levels, cause residue in the throat that can enter the airway, may silently aspirate thickened liquids.  The pt indicated she would prefer thin.     Meds:crushed with puree, via PEG   Strategies:slow rate, alternate liquids with solids, swallow prior to additional po, effortful swallow and double swallow.   Upright position  F/u ST tx: YES   Aspiration Precautions  Reflux Precautions  Consider consult with: f/u with Rehab  Results reviewed  "with: pt,   Aspiration precautions posted.  If a dedicated assessment of the esophagus is desired, consider esophagram/barium swallow or EGD.         Short-term goals:  Patient will receive a videofluoroscopic swallow study (VFSS) to assess her current swallowing function to r/o penetration or aspiration, to determine the safest, least restrictive diet, and to recommended the appropriate rehabilitation exercises (to be achieved in 2-3 weeks). MET    Patient will be educated on safe swallowing compensatory strategies (e.g., upright posture, small bites/sips, slow rate, alternation of consistencies, multiple swallows, effortful swallow, etc.) to facilitate increased safety with P.O. intake (to be achieved in 4-6 weeks).      Patient will tolerate P.O. trials of her recommended diet with the implementation of safe swallowing strategies without overt s/sx of penetration and/or aspiration during skilled therapy sessions in this certification period (to be achieved in 4-6 weeks).     Trials of chocolate pudding today: at first, she asked to stop after 3 bites of pudding. She was encouraged to continue as \"eating is the best exercise to return to eating\"    3 bites; 2 sips - at a time (using liquid wash effectively)  Patient tolerate 80% of pudding cup.   She then asked to stop again; which we did.     Cough x 1 - patient spit up mucus after her first liquid wash    Patient would likely benefit from NMES - will discuss with primary SLP   Discussed NMES with patient; recommended she bring puree foods for trial; as she will be encouraged to take PO in conjunction with NMES    Patient will perform oral motor exercises using IOPI device on lingual muscles to facilitate increased function and strength by 25% for improved swallowing function (to be achieved in 6-8 weeks).     [place] Pro Pmax Data Tracker Grid (from [id number]):     01/09/25 01/14/25       Tongue tip (GOAL = 56) 50 49       Tongue back (GOAL = 50) 47 54     "       IOPI Pro -- Today's Exercise Targets:     Pmax (after 3 trials): Effort level: Target for today's treatment:   Tongue tip  (GOAL = 56) 49 70% 34   Tongue back  (GOAL = 50) 54 70% 38       IOPI Pro -- Exercise Tracker Grid (from [id number]):     01/09/25 01/14/25       Tongue tip 3 sets of 30 reps (pumps)   3 sets of 30 reps (pumps)    Endurance holds (50% of max) x 2; avg 20       Tongue back 3 sets of 30 reps (pumps)   3 sets of 30 reps (pumps)    Endurance holds (50% of max) x 2; avg 23 sec           Patient will independently complete pharyngeal strengthening exercises (e.g., Effortful swallow, Mendelsohn, Evelia) daily to facilitate increased pharyngeal strength and coordination (to be achieved in 4-6 weeks).     Plan:  -Patient was provided with home exercises/activities to target goals in plan of care at the end of today's session.  -Continue with current plan of care.

## 2025-01-15 ENCOUNTER — TELEPHONE (OUTPATIENT)
Age: 70
End: 2025-01-15

## 2025-01-15 ENCOUNTER — HOSPITAL ENCOUNTER (OUTPATIENT)
Dept: INFUSION CENTER | Facility: CLINIC | Age: 70
Discharge: HOME | End: 2025-01-15
Payer: MEDICARE

## 2025-01-15 VITALS
SYSTOLIC BLOOD PRESSURE: 121 MMHG | TEMPERATURE: 97.9 F | DIASTOLIC BLOOD PRESSURE: 81 MMHG | HEART RATE: 106 BPM | RESPIRATION RATE: 16 BRPM

## 2025-01-15 DIAGNOSIS — Z17.0 MALIGNANT NEOPLASM OF UPPER-OUTER QUADRANT OF RIGHT BREAST IN FEMALE, ESTROGEN RECEPTOR POSITIVE (HCC): Primary | ICD-10-CM

## 2025-01-15 DIAGNOSIS — C50.411 MALIGNANT NEOPLASM OF UPPER-OUTER QUADRANT OF RIGHT BREAST IN FEMALE, ESTROGEN RECEPTOR POSITIVE (HCC): Primary | ICD-10-CM

## 2025-01-15 LAB
ALBUMIN SERPL BCG-MCNC: 3.3 G/DL (ref 3.5–5)
ALP SERPL-CCNC: 75 U/L (ref 34–104)
ALT SERPL W P-5'-P-CCNC: 8 U/L (ref 7–52)
ANION GAP SERPL CALCULATED.3IONS-SCNC: 7 MMOL/L (ref 4–13)
AST SERPL W P-5'-P-CCNC: 27 U/L (ref 13–39)
BASOPHILS # BLD AUTO: 0.02 THOUSANDS/ΜL (ref 0–0.1)
BASOPHILS NFR BLD AUTO: 1 % (ref 0–1)
BILIRUB SERPL-MCNC: 0.38 MG/DL (ref 0.2–1)
BUN SERPL-MCNC: 20 MG/DL (ref 5–25)
CALCIUM ALBUM COR SERPL-MCNC: 9.7 MG/DL (ref 8.3–10.1)
CALCIUM SERPL-MCNC: 9.1 MG/DL (ref 8.4–10.2)
CHLORIDE SERPL-SCNC: 105 MMOL/L (ref 96–108)
CO2 SERPL-SCNC: 28 MMOL/L (ref 21–32)
CREAT SERPL-MCNC: 0.95 MG/DL (ref 0.6–1.3)
EOSINOPHIL # BLD AUTO: 0.06 THOUSAND/ΜL (ref 0–0.61)
EOSINOPHIL NFR BLD AUTO: 2 % (ref 0–6)
ERYTHROCYTE [DISTWIDTH] IN BLOOD BY AUTOMATED COUNT: 14.3 % (ref 11.6–15.1)
GFR SERPL CREATININE-BSD FRML MDRD: 61 ML/MIN/1.73SQ M
GLUCOSE SERPL-MCNC: 117 MG/DL (ref 65–140)
HCT VFR BLD AUTO: 23.9 % (ref 34.8–46.1)
HGB BLD-MCNC: 7.6 G/DL (ref 11.5–15.4)
IMM GRANULOCYTES # BLD AUTO: 0.01 THOUSAND/UL (ref 0–0.2)
IMM GRANULOCYTES NFR BLD AUTO: 0 % (ref 0–2)
LYMPHOCYTES # BLD AUTO: 0.43 THOUSANDS/ΜL (ref 0.6–4.47)
LYMPHOCYTES NFR BLD AUTO: 14 % (ref 14–44)
MAGNESIUM SERPL-MCNC: 2 MG/DL (ref 1.9–2.7)
MCH RBC QN AUTO: 30.4 PG (ref 26.8–34.3)
MCHC RBC AUTO-ENTMCNC: 31.8 G/DL (ref 31.4–37.4)
MCV RBC AUTO: 96 FL (ref 82–98)
MONOCYTES # BLD AUTO: 0.09 THOUSAND/ΜL (ref 0.17–1.22)
MONOCYTES NFR BLD AUTO: 3 % (ref 4–12)
NEUTROPHILS # BLD AUTO: 2.45 THOUSANDS/ΜL (ref 1.85–7.62)
NEUTS SEG NFR BLD AUTO: 80 % (ref 43–75)
NRBC BLD AUTO-RTO: 0 /100 WBCS
PLATELET # BLD AUTO: 149 THOUSANDS/UL (ref 149–390)
PMV BLD AUTO: 10.2 FL (ref 8.9–12.7)
POTASSIUM SERPL-SCNC: 3.3 MMOL/L (ref 3.5–5.3)
PROT SERPL-MCNC: 7.3 G/DL (ref 6.4–8.4)
RBC # BLD AUTO: 2.5 MILLION/UL (ref 3.81–5.12)
SODIUM SERPL-SCNC: 140 MMOL/L (ref 135–147)
WBC # BLD AUTO: 3.06 THOUSAND/UL (ref 4.31–10.16)

## 2025-01-15 PROCEDURE — 85025 COMPLETE CBC W/AUTO DIFF WBC: CPT | Performed by: INTERNAL MEDICINE

## 2025-01-15 PROCEDURE — 80053 COMPREHEN METABOLIC PANEL: CPT | Performed by: INTERNAL MEDICINE

## 2025-01-15 PROCEDURE — 83735 ASSAY OF MAGNESIUM: CPT | Performed by: INTERNAL MEDICINE

## 2025-01-15 RX ADMIN — MAGNESIUM SULFATE HEPTAHYDRATE: 500 INJECTION, SOLUTION INTRAMUSCULAR; INTRAVENOUS at 10:26

## 2025-01-15 NOTE — PROGRESS NOTES
Pt. Denied new symptoms or concerns today. Labs obtained via port a cath. Pt. Tolerated Hydration without adverse event. Pt. Will return 1/17/25 at 1130.  AVS declined.

## 2025-01-15 NOTE — TELEPHONE ENCOUNTER
Left VM for patient to inform her of follow-up appt tomorrow at 9. I informed her that [employer] is scheduled as well. Callback number left.

## 2025-01-16 ENCOUNTER — TELEPHONE (OUTPATIENT)
Dept: HEMATOLOGY ONCOLOGY | Facility: CLINIC | Age: 70
End: 2025-01-16

## 2025-01-16 ENCOUNTER — TELEPHONE (OUTPATIENT)
Dept: SPEECH THERAPY | Facility: CLINIC | Age: 70
End: 2025-01-16

## 2025-01-16 ENCOUNTER — TELEPHONE (OUTPATIENT)
Dept: NUTRITION | Facility: HOSPITAL | Age: 70
End: 2025-01-16

## 2025-01-16 NOTE — TELEPHONE ENCOUNTER
Patient did not show for her 12:15pm outpatient Speech Therapy appointment. Clinician attempted to contact patient via telephone. Clinician left a message on patient's voicemail requesting a call back to reschedule today's appointment

## 2025-01-16 NOTE — TELEPHONE ENCOUNTER
Left VM for patient to let her know of her appt today with JOANN Caraballo at 3pm. I left my direct number for her to callback.

## 2025-01-16 NOTE — PROGRESS NOTES
Daily Speech Treatment Note    Today's date: 2025  Patient’s name: Maira Moura  : 1955  MRN: [id number]  Safety measures: HTN, CVA, GERD, active CA, s/p PEG tube placement, aspiration precautions  Current recommended diet: PEG tube for primary nutrition/hydration; puree with thin liquids P.O. as tolerated  Referring provider: Harika Medina DO Encounter Diagnosis     ICD-10-CM    1. Dysphagia, oropharyngeal phase  R13.12       2. Status post tonsillectomy  Z90.89       3. 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 intake is lessening (pain: 5/10 when swallowing foods/liquids P.O.).    Objective/Assessment:  -Reviewed patient's home exercises/activities completed since last appointment. Patient indicated that she has been completing her recommended HEP.    Short-term goals:  Patient will receive a videofluoroscopic swallow study (VFSS) to assess her current swallowing function to r/o penetration or aspiration, to determine the safest, least restrictive diet, and to recommended the appropriate rehabilitation exercises (to be achieved in 2-3 weeks).      Patient will be educated on safe swallowing compensatory strategies (e.g., upright posture, small bites/sips, slow rate, alternation of consistencies, multiple swallows, effortful swallow, etc.) to facilitate increased safety with P.O. intake (to be achieved in 4-6 weeks).      Patient will tolerate P.O. trials of her recommended diet with the implementation of safe swallowing strategies without overt s/sx of penetration and/or aspiration during skilled therapy sessions in this certification  period (to be achieved in 4-6 weeks).     Traditional VitalStim    Channel(s) used: 1, 2   Placement: 3a   Duty Cycle: 57/1 s   Frequency: 80 pulses per second   Phase Duration: 300 microseconds   Treatment Duration: 37 minutes   Min mA level: 6.0 mA   Max mA level: 11.0 mA     Patient tolerated NMES in conjunction with pharyngeal/laryngeal strengthening exercises and P.O. intake. (The patient received instruction on the potential benefits from NMES treatment, including neuromuscular re-education and muscle strengthening.) Skin condition post-NMES: intact.     Patient consumed the following during today’s session: full cup of chocolate pudding and water (thin liquid) via cup sips    Oral containment was WFL. Increased time required for bolus formation/control and AP transfer. Swallow initiation appeared to be WFL. Patient with reduced hyolaryngeal elevation and excursion were noted upon palpation. No oral cavity residue was observed. Patient did not report any c/o globus sensation. No overt s/sx of penetration/aspiration noted during today's session. Patient implemented small bites and slow rate with P.O. intake, as well as liquid washes as needed.      Patient will perform oral motor exercises using IOPI device on lingual muscles to facilitate increased function and strength by 25% for improved swallowing function (to be achieved in 6-8 weeks).    [employer] Pro Pmax Data Tracker Grid (from [id number]):     01/09/25 01/14/25 01/23/25      Tongue tip (GOAL = 56) 50 49 49      Tongue back (GOAL = 50) 47 54 61          [employer] -- Today's Exercise Targets:     Pmax (after 3 trials): Effort level: Target for today's treatment:   Tongue tip  (GOAL = 56) 49 75% (pumps)  50% (holds)   37  25   Tongue back  (GOAL = 50) 61 75% (pumps)  50% (holds)   46  30       [employer] -- Exercise Tracker Grid (from [id number]):     01/09/25 01/14/25 01/23/25      Tongue tip 3 sets of 30 reps (pumps)   3 sets of 30 reps  (pumps)    Endurance holds (50% of max) x 2; avg 20   3 sets of 30 reps (pumps)    1 sets of 3 reps (20.47 second holds)      Tongue back 3 sets of 30 reps (pumps)   3 sets of 30 reps (pumps)    Endurance holds (50% of max) x 2; avg 23 sec   3 sets of 30 reps (pumps)    1 sets of 3 reps (16.92 second holds)         Patient will independently complete pharyngeal strengthening exercises (e.g., Effortful swallow, Mendelsohn, Evelia) daily to facilitate increased pharyngeal strength and coordination (to be achieved in 4-6 weeks).   -Patient completed the following pharyngeal strengthening exercises during today's session:  *Effortful swallow: 2 sets of 10 reps  *Mendelsohn: 2 sets of 10 reps  *Evelia: 2 sets of 10 reps     Plan:  -Patient was provided with home exercises/activities to target goals in plan of care at the end of today's session.  -Continue with current plan of care.

## 2025-01-16 NOTE — TELEPHONE ENCOUNTER
Contacted pt to check in on nutrition. I sent her a sample case of Shayy Daily Peptide 1.5 given reported diarrhea with Jevity 1.5. It was delivered per [employer] tracking. She was seen by SLP 1/14 and noted to have ongoing dysphagia and risk for aspiration. SLP recommended diet: PEG for primary, dysphagia 1 pureed, thin liquids.     Pt did not answer call and RD left x2 messages for patient. If necessary, RD will coordinate time to meet pt in person at [facility] center for follow up.

## 2025-01-17 ENCOUNTER — HOSPITAL ENCOUNTER (OUTPATIENT)
Dept: INFUSION CENTER | Facility: CLINIC | Age: 70
End: 2025-01-17
Payer: MEDICARE

## 2025-01-17 ENCOUNTER — NUTRITION (OUTPATIENT)
Dept: NUTRITION | Facility: CLINIC | Age: 70
End: 2025-01-17

## 2025-01-17 VITALS
DIASTOLIC BLOOD PRESSURE: 73 MMHG | SYSTOLIC BLOOD PRESSURE: 106 MMHG | TEMPERATURE: 97.8 F | RESPIRATION RATE: 16 BRPM | HEART RATE: 109 BPM

## 2025-01-17 DIAGNOSIS — C50.411 MALIGNANT NEOPLASM OF UPPER-OUTER QUADRANT OF RIGHT BREAST IN FEMALE, ESTROGEN RECEPTOR POSITIVE (HCC): Primary | ICD-10-CM

## 2025-01-17 DIAGNOSIS — Z17.0 MALIGNANT NEOPLASM OF UPPER-OUTER QUADRANT OF RIGHT BREAST IN FEMALE, ESTROGEN RECEPTOR POSITIVE (HCC): Primary | ICD-10-CM

## 2025-01-17 DIAGNOSIS — Z71.3 NUTRITIONAL COUNSELING: Primary | ICD-10-CM

## 2025-01-17 LAB
ALBUMIN SERPL BCG-MCNC: 3.4 G/DL (ref 3.5–5)
ALP SERPL-CCNC: 72 U/L (ref 34–104)
ALT SERPL W P-5'-P-CCNC: 8 U/L (ref 7–52)
ANION GAP SERPL CALCULATED.3IONS-SCNC: 6 MMOL/L (ref 4–13)
AST SERPL W P-5'-P-CCNC: 23 U/L (ref 13–39)
BASOPHILS # BLD AUTO: 0.01 THOUSANDS/ΜL (ref 0–0.1)
BASOPHILS NFR BLD AUTO: 0 % (ref 0–1)
BILIRUB SERPL-MCNC: 0.37 MG/DL (ref 0.2–1)
BUN SERPL-MCNC: 20 MG/DL (ref 5–25)
CALCIUM ALBUM COR SERPL-MCNC: 9.5 MG/DL (ref 8.3–10.1)
CALCIUM SERPL-MCNC: 9 MG/DL (ref 8.4–10.2)
CHLORIDE SERPL-SCNC: 107 MMOL/L (ref 96–108)
CO2 SERPL-SCNC: 27 MMOL/L (ref 21–32)
CREAT SERPL-MCNC: 1.15 MG/DL (ref 0.6–1.3)
EOSINOPHIL # BLD AUTO: 0.02 THOUSAND/ΜL (ref 0–0.61)
EOSINOPHIL NFR BLD AUTO: 1 % (ref 0–6)
ERYTHROCYTE [DISTWIDTH] IN BLOOD BY AUTOMATED COUNT: 14.2 % (ref 11.6–15.1)
GFR SERPL CREATININE-BSD FRML MDRD: 49 ML/MIN/1.73SQ M
GLUCOSE SERPL-MCNC: 103 MG/DL (ref 65–140)
HCT VFR BLD AUTO: 23.2 % (ref 34.8–46.1)
HGB BLD-MCNC: 7.5 G/DL (ref 11.5–15.4)
IMM GRANULOCYTES # BLD AUTO: 0.02 THOUSAND/UL (ref 0–0.2)
IMM GRANULOCYTES NFR BLD AUTO: 1 % (ref 0–2)
LYMPHOCYTES # BLD AUTO: 0.38 THOUSANDS/ΜL (ref 0.6–4.47)
LYMPHOCYTES NFR BLD AUTO: 9 % (ref 14–44)
MAGNESIUM SERPL-MCNC: 2 MG/DL (ref 1.9–2.7)
MCH RBC QN AUTO: 30.9 PG (ref 26.8–34.3)
MCHC RBC AUTO-ENTMCNC: 32.3 G/DL (ref 31.4–37.4)
MCV RBC AUTO: 96 FL (ref 82–98)
MONOCYTES # BLD AUTO: 0.15 THOUSAND/ΜL (ref 0.17–1.22)
MONOCYTES NFR BLD AUTO: 4 % (ref 4–12)
NEUTROPHILS # BLD AUTO: 3.62 THOUSANDS/ΜL (ref 1.85–7.62)
NEUTS SEG NFR BLD AUTO: 85 % (ref 43–75)
NRBC BLD AUTO-RTO: 0 /100 WBCS
PLATELET # BLD AUTO: 138 THOUSANDS/UL (ref 149–390)
PMV BLD AUTO: 10.4 FL (ref 8.9–12.7)
POTASSIUM SERPL-SCNC: 4 MMOL/L (ref 3.5–5.3)
PROT SERPL-MCNC: 7.3 G/DL (ref 6.4–8.4)
RBC # BLD AUTO: 2.43 MILLION/UL (ref 3.81–5.12)
SODIUM SERPL-SCNC: 140 MMOL/L (ref 135–147)
WBC # BLD AUTO: 4.2 THOUSAND/UL (ref 4.31–10.16)

## 2025-01-17 PROCEDURE — 83735 ASSAY OF MAGNESIUM: CPT | Performed by: INTERNAL MEDICINE

## 2025-01-17 PROCEDURE — 85025 COMPLETE CBC W/AUTO DIFF WBC: CPT | Performed by: INTERNAL MEDICINE

## 2025-01-17 PROCEDURE — 80053 COMPREHEN METABOLIC PANEL: CPT | Performed by: INTERNAL MEDICINE

## 2025-01-17 PROCEDURE — 96360 HYDRATION IV INFUSION INIT: CPT

## 2025-01-17 PROCEDURE — 36593 DECLOT VASCULAR DEVICE: CPT

## 2025-01-17 RX ADMIN — ALTEPLASE 2 MG: 2.2 INJECTION, POWDER, LYOPHILIZED, FOR SOLUTION INTRAVENOUS at 12:04

## 2025-01-17 RX ADMIN — SODIUM CHLORIDE 1000 ML: 0.9 INJECTION, SOLUTION INTRAVENOUS at 12:52

## 2025-01-17 NOTE — PROGRESS NOTES
Outpatient Oncology Nutrition Consultation   Type of Consult: Follow Up   Care Location: [facility]    Reason for referral: Received notification by [facility] ZAPATA on 8/21/24 that pt has triggered for oncology nutrition care (reason for referral: HNC dx and Malnutrition Screening Tool (MST) Triggers: scored a 0 indicating No recent wt loss and is not eating poorly due to a decreased appetite. 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, unresectable     7/31/2023 - FOLFOX     11/20/2023 - opdivo added to FOLFOX     12/18/2023-cycle 11-20% dose reduction of 5-FU bolus and oxaliplatin due to increased fatigue     Jan 2024 - oxaliplatin removed from treatment plan due to neuropathy - PET scan shows good disease control in all areas except colon lesion     3/2024 - right hemicolectomy - pT3N0     6/3/24 - cryoablation to liver (no interruption to systemic therapy)     7/30/2024 - stop folfox     9/2024 - 10/2024 - cis/RT to tonsils and cervical  "Lns    12/2024 - disease progression of colon cancer in the liver     Malignant neoplasm of right female breast (HCC)   11/23/2018 Initial Diagnosis    Malignant neoplasm of right female breast (HCC)     11/23/2018 Biopsy    Rt Breast US BX 1100 8cmfn, 4 passes 12g Marquee:  - Invasive breast carcinoma of no special type (ductal NST/invasive ductal carcinoma).  - grade 2  - %  - WA 95%  - HER2 negative     12/20/2018 Surgery    Right partial mastectomy and SLN biopsy:  Infiltrating ductal carcinoma, Grade 2/3, felt to be between 2.0 cm and 2.5 cm in greatest dimension  - No invasive tumor is seen at inked margins, but there is a focus of DCIS seen within approximately 1mm of the inked medial margin  - no LVI  - no LCIS  - 0/2 LN's  at least Stage IIA - pT2, pN0, cM0, G2.    - Dr Coronado     12/20/2018 -  Cancer Staged    Stage IIA - pT2, pN0, cM0, G2.       1/4/2019 Genomic Testing    Oncotype DX score: 13     2/1/2019 -  Hormone Therapy    anastrozole 1 mg daily as adjuvant endocrine therapy    - Dr Daley       2/14/2019 - 4/3/2019 Radiation    Course: C1    Plan ID Energy Fractions Dose per Fraction (cGy) Dose Correction (cGy) Total Dose Delivered (cGy) Elapsed Days   R Breast 10X/6X 25 / 25 200 0 5,000 40   R BRST BOOST 16E 5 / 5 200 0 1,000 7      Treatment dates:  C1: 2/14/2019 - 4/3/2019       Metastatic colon cancer to liver (HCC)   7/6/2023 Genetic Testing    CARIS Results:   KRAS Pathogenic Variant Exon 2  p.G12D : lack of benefit of cetuximab, panitumumab Level 2   No other actionable mutation; MSI stable; TMB low   MiFOLOXAi Results: \"Decreased Benefit of FOLFOX + Bevacizumab in first line metastatic CRC.  This patient may achieve improved results by receiving an alternative to FOLFOX, such as FOLFIRI, as their initial regimen. As an adjustment to frontline FOLFOXIRI following toxicity: This patient may achieve improved results by removing the oxaliplatin portion of their regimen\".       "   7/11/2023 Initial Diagnosis    Metastatic colon cancer to liver (HCC)     7/13/2023 -  Cancer Staged    Staging form: Colon and Rectum, AJCC 8th Edition  - Clinical stage from 7/13/2023: cT3, cN0, pM1 - Signed by Harika Medina DO on 9/28/2023  Total positive nodes: 0       7/31/2023 - 8/1/2024 Chemotherapy    cyanocobalamin, 1,000 mcg, Intramuscular, Every 30 days, 7 of 9 cycles  Administration: 1,000 mcg (5/9/2024), 1,000 mcg (5/24/2024), 1,000 mcg (6/20/2024), 1,000 mcg (7/3/2024), 1,000 mcg (7/18/2024), 1,000 mcg (8/1/2024)  potassium chloride, 20 mEq, Intravenous, Once, 2 of 2 cycles  Administration: 20 mEq (11/6/2023), 20 mEq (11/20/2023)  alteplase (CATHFLO), 2 mg, Intracatheter, Every 1 Minute as needed, 21 of 23 cycles  Administration: 2 mg (1/3/2024)  pegfilgrastim (NEULASTA), 6 mg, Subcutaneous, Once, 12 of 12 cycles  Administration: 6 mg (10/25/2023), 6 mg (11/8/2023), 6 mg (11/22/2023), 6 mg (12/6/2023), 6 mg (12/20/2023), 6 mg (1/12/2024), 6 mg (1/25/2024), 6 mg (4/25/2024), 6 mg (5/9/2024), 6 mg (5/24/2024), 6 mg (6/20/2024)  fluorouracil (ADRUCIL), 895 mg, Intravenous, Once, 21 of 23 cycles  Dose modification: 320 mg/m2 (original dose 400 mg/m2, Cycle 11)  Administration: 900 mg (7/31/2023), 900 mg (8/14/2023), 900 mg (8/28/2023), 900 mg (9/11/2023), 900 mg (9/25/2023), 900 mg (10/9/2023), 900 mg (10/23/2023), 895 mg (11/6/2023), 895 mg (11/20/2023), 895 mg (12/4/2023), 700 mg (12/18/2023), 680 mg (1/10/2024), 680 mg (1/23/2024), 625 mg (4/23/2024), 625 mg (5/7/2024), 625 mg (5/22/2024), 625 mg (6/4/2024), 625 mg (6/18/2024), 625 mg (7/1/2024), 625 mg (7/16/2024), 625 mg (7/30/2024)  nivolumab (OPDIVO) IVPB, 240 mg (100 % of original dose 240 mg), Intravenous, Once, 13 of 15 cycles  Dose modification: 240 mg (original dose 240 mg, Cycle 9)  Administration: 240 mg (11/20/2023), 240 mg (12/4/2023), 240 mg (12/18/2023), 240 mg (1/10/2024), 240 mg (1/23/2024), 240 mg (4/23/2024), 240 mg (5/7/2024),  240 mg (5/22/2024), 240 mg (6/4/2024), 240 mg (6/18/2024), 240 mg (7/1/2024), 240 mg (7/16/2024), 240 mg (7/30/2024)  leucovorin calcium IVPB, 896 mg, Intravenous, Once, 21 of 23 cycles  Administration: 900 mg (7/31/2023), 900 mg (8/14/2023), 900 mg (8/28/2023), 900 mg (9/11/2023), 900 mg (9/25/2023), 900 mg (10/9/2023), 900 mg (10/23/2023), 900 mg (11/6/2023), 900 mg (11/20/2023), 900 mg (12/4/2023), 900 mg (12/18/2023), 850 mg (1/10/2024), 850 mg (1/23/2024), 800 mg (4/23/2024), 800 mg (5/7/2024), 800 mg (5/22/2024), 800 mg (6/4/2024), 800 mg (6/18/2024), 800 mg (7/1/2024), 800 mg (7/16/2024), 800 mg (7/30/2024)  oxaliplatin (ELOXATIN) chemo infusion, 85 mg/m2 = 190.4 mg, Intravenous, Once, 12 of 12 cycles  Dose modification: 68 mg/m2 (original dose 85 mg/m2, Cycle 11, Reason: Other (Must fill in a comment), Comment: increased fatigue)  Administration: 190.4 mg (7/31/2023), 190.4 mg (8/14/2023), 200 mg (8/28/2023), 200 mg (9/11/2023), 200 mg (9/25/2023), 200 mg (10/9/2023), 200 mg (10/23/2023), 200 mg (11/6/2023), 200 mg (11/20/2023), 190.4 mg (12/4/2023), 150 mg (12/18/2023), 150 mg (1/10/2024)  fluorouracil (ADRUCIL) ambulatory infusion Soln, 1,200 mg/m2/day = 5,375 mg, Intravenous, Over 46 hours, 21 of 23 cycles     9/26/2023 -  Cancer Staged    Staging form: Colon and Rectum, AJCC 8th Edition  - Pathologic: pT3, pN0, cM1 - Signed by Vickie Israel MD on 4/3/2024  Total positive nodes: 0       3/12/2024 Surgery    A. Terminal ileum, appendix, right colon (right hemicolectomy):  - Adenocarcinoma (5.2cm)  - Forty-nine (49) lymph nodes negative for carcinoma (0/49)    - Acellular mucin present in one (1) lymph node   - See staging synoptic (ypT3N0)     1/8/2025 -  Chemotherapy    alteplase (CATHFLO), 2 mg, Intracatheter, Every 1 Minute as needed, 1 of 6 cycles  fluorouracil (ADRUCIL), 400 mg/m2 = 770 mg, Intravenous, Once, 1 of 6 cycles  Administration: 770 mg (1/8/2025)  irinotecan (CAMPTOSAR) chemo infusion,  173 mg (50 % of original dose 180 mg/m2), Intravenous, Once, 1 of 6 cycles  Dose modification: 90 mg/m2 (original dose 180 mg/m2, Cycle 1, Reason: Anticipated Tolerance, Comment: 50% dose reduction due to pre-existing diarrhea)  Administration: 180 mg (1/8/2025)  leucovorin calcium IVPB, 768 mg, Intravenous, Once, 1 of 6 cycles  Administration: 800 mg (1/8/2025)  fluorouracil (ADRUCIL) ambulatory infusion Soln, 1,200 mg/m2/day = 4,610 mg, Intravenous, Over 46 hours, 1 of 6 cycles     Right tonsillar squamous cell carcinoma (HCC)   7/27/2023 Initial Diagnosis    Right tonsillar squamous cell carcinoma (HCC)     7/27/2023 -  Cancer Staged    Staging form: Pharynx - Oropharynx, AJCC 8th Edition  - Clinical stage from 7/27/2023: Stage III (cT1, cN3, cM0, p16+) - Signed by Evan Plummer MD on 7/27/2023  Stage prefix: Initial diagnosis       9/16/2024 - 11/13/2024 Radiation      Plan ID Energy Fractions Dose per Fraction (cGy) Dose Correction (cGy) Total Dose Delivered (cGy) Elapsed Days   Head_Neck 6X-FFF 35 / 35 200 0 7,000 58    C2: 9/16/2024 - 11/13/2024 9/17/2024 - 10/21/2024 Chemotherapy    alteplase (CATHFLO), 2 mg, Intracatheter, Every 1 Minute as needed, 6 of 6 cycles  Administration: 2 mg (10/21/2024)  CISplatin (PLATINOL) infusion, 70 mg (original dose 40 mg/m2), Intravenous, Once, 6 of 6 cycles  Dose modification: 70 mg (original dose 40 mg/m2, Cycle 1, Reason: Anticipated Tolerance), 30 mg/m2 (original dose 40 mg/m2, Cycle 4, Reason: Neuropathy, Comment: dose reduction to 30 mg/m2 due to neuropathy)  Administration: 70 mg (9/17/2024), 70 mg (9/23/2024), 70 mg (9/30/2024), 59.1 mg (10/7/2024), 59.1 mg (10/14/2024), 59.1 mg (10/21/2024)  sodium chloride, 500 mL, Intravenous, Once, 6 of 6 cycles  Administration: 500 mL (9/17/2024), 500 mL (9/17/2024), 500 mL (9/23/2024), 500 mL (9/23/2024), 500 mL (9/30/2024), 500 mL (9/30/2024), 500 mL (10/7/2024), 500 mL (10/7/2024), 500 mL (10/14/2024), 500 mL  (10/14/2024), 500 mL (10/21/2024)  fosaprepitant (EMEND) IVPB, 150 mg, Intravenous, Once, 6 of 6 cycles  Administration: 150 mg (9/17/2024), 150 mg (9/23/2024), 150 mg (9/30/2024), 150 mg (10/7/2024), 150 mg (10/14/2024), 150 mg (10/21/2024)  IVPB builder, , Intravenous, Once, 1 of 1 cycle  Administration: Unknown dose (10/21/2024)       Past Medical & Surgical Hx:   Patient Active Problem List   Diagnosis    Primary hypertension    Mixed hyperlipidemia    Malignant neoplasm of right female breast (HCC)    Vitamin D deficiency    Prediabetes    Right thyroid nodule    GERD (gastroesophageal reflux disease)    Obesity    Metastatic colon cancer to liver (HCC)    Right tonsillar squamous cell carcinoma (HCC)    Poor appetite    Nutritional anemia    Dehydration    Drug-induced constipation    Chemotherapy induced neutropenia (HCC)    Stage 3a chronic kidney disease (HCC)    Thrombocytopenia (HCC)    Neuropathy    Diastolic dysfunction    Hypokalemia    Leukemoid reaction    GOGO (acute kidney injury) (HCC)    Acute post-operative pain    At risk for constipation    At risk for delirium    Palliative care encounter    Physical deconditioning    History of CVA (cerebrovascular accident)    Chronic nasal congestion    Elevated LFTs    Vitamin B12 deficiency    Neoplastic malignant related fatigue    Sensation, choking    S/P percutaneous endoscopic gastrostomy (PEG) tube placement (HCC)    Pancytopenia due to chemotherapy (HCC)    Cancer related pain    Hypomagnesemia    Functional diarrhea     Past Medical History:   Diagnosis Date    Anemia     Arthritis     Body mass index (BMI) 40.0-44.9, adult (HCC) 9/22/2023    Breast cancer (HCC) 12/17/2018    Cancer (HCC)     right breast, colon, liver, right tonsil    Cervical lymphadenopathy 3/21/2023    CT neck on 3/30/2023- Large right level 2A lymphadenopathy as described above and suspicious for neoplasm.  Correlation with histopathology is recommended.  Mild asymmetric  prominence of the right palatine tonsil with otherwise no definitive nodular enhancing lesions identified along the course of the aerodigestive tract.     5/26/23- FNA of this node was nonrevealing for tissue etiology, but it d    Encounter for screening for HIV 07/07/2022    Follow-up examination 04/04/2023    GERD (gastroesophageal reflux disease)     Hyperlipidemia     Hypertension     Obesity     Stroke (HCC)     TIA     Stroke (HCC)     TIA     Transaminitis 09/22/2021    Vasomotor rhinitis 8/9/2018    Refilled flonase today. Stopped taking since January 2022     Past Surgical History:   Procedure Laterality Date    BREAST BIOPSY Right 11/23/2018    us guided bx cancer    BREAST SURGERY      COLONOSCOPY      ESOPHAGOSCOPY N/A 07/20/2023    Procedure: ESOPHAGOSCOPY;  Surgeon: David Chapa MD;  [facility];  Service: ENT    HEMICOLOECTOMY W/ ANASTOMOSIS Right 3/12/2024    Procedure: RIGHT COLECTOMY WITH ROBOTICS;  Surgeon: Vickie Israel MD;  [facility];  Service: Surgical Oncology    IR BIOPSY LIVER MASS  06/27/2023    IR CRYOABLATION  6/3/2024    IR GASTROSTOMY TUBE PLACEMENT  10/2/2024    IR PORT CHECK  01/03/2024    IR PORT PLACEMENT  07/28/2023    IR PORT STRIPPING  01/09/2024    LAPAROTOMY N/A 3/12/2024    Procedure: LAPAROTOMY EXPLORATORY W/ ROBOTICS;  Surgeon: Vickie Israel MD;  [facility];  Service: Surgical Oncology    ID BIOPSY NASOPHARYNX VISIBLE LESION SIMPLE N/A 10/9/2024    Procedure: NASOPHARYNGOSCOPY with biospy;  Surgeon: Juanito Matthew MD;  [facility];  Service: ENT    ID BRNCHSC INCL FLUOR GDNCE DX W/CELL WASHG SPX N/A 07/20/2023    Procedure: BRONCHOSCOPY;  Surgeon: David Chapa MD;  [facility];  Service: ENT    ID LARYNGOSCOPY W/BIOPSY MICROSCOPE/TELESCOPE N/A 07/20/2023    Procedure: MICRODIRECT LARYNGOSCOPY WITH BIOPSY;  Surgeon: David Chapa MD;  [facility];  Service: ENT    ID LARYNGOSCOPY W/WO TRACHEOSCOPY  DX EXCEPT  N/A 10/9/2024    Procedure: DIRECT LARYNGOSCOPY;  Surgeon: Juanito Matthew MD;  [facility];  Service: ENT    MA MASTECTOMY PARTIAL W/AXILLARY LYMPHADENECTOMY Right 2018    Procedure: ULTRASOUND LOCALIZED PARTIAL MASTECTOMY W/SENTINEL NODE BIOPSY POSS AXILLARY DISSECTION;  Surgeon: Zahida Coronado MD;  Location: [facility];  Service: General    MA TONSILLECTOMY PRIMARY/SECONDARY AGE 13/> N/A 10/9/2024    Procedure: TONSILLECTOMY;  Surgeon: Juanito Matthew MD;  [facility];  Service: ENT    TUBAL LIGATION      US GUIDED BREAST BIOPSY RIGHT COMPLETE Right 2018    US GUIDED INJECTION FOR RESEARCH STUDY  2018    US GUIDED INJECTION FOR RESEARCH STUDY  2018    US GUIDED INJECTION FOR RESEARCH STUDY  2019    US GUIDED LYMPH NODE BIOPSY RIGHT  2023       Review of Medications:   Vitamins, Supplements and Herbals: No, pt denies taking supplements    Current Outpatient Medications:     acetaminophen (TYLENOL) 160 mg/5 mL liquid, Take 20 mL (640 mg total) by mouth every 6 (six) hours as needed for mild pain for up to 10 days, Disp: 473 mL, Rfl: 0    amLODIPine (NORVASC) 5 mg tablet, 1 tablet (5 mg total) by Per G Tube route daily, Disp: 30 tablet, Rfl: 0    anastrozole (ARIMIDEX) 1 mg tablet, Take 1 tablet (1 mg total) by mouth daily, Disp: 90 tablet, Rfl: 3    atorvastatin (LIPITOR) 40 mg tablet, Take 1 tablet (40 mg total) by mouth daily at bedtime, Disp: 30 tablet, Rfl: 2    cholestyramine (QUESTRAN) 4 g packet, Take 1 packet (4 g total) by mouth 3 (three) times a day with meals, Disp: 90 packet, Rfl: 3    diphenhydramine, lidocaine, Al/Mg hydroxide, simethicone (Magic Mouthwash) SUSP, Swish and swallow 10 mL every 4 (four) hours as needed for mouth pain or discomfort, Disp: 480 mL, Rfl: 2    docusate sodium (COLACE) 50 mg capsule, Take 1 capsule (50 mg total) by mouth 2 (two) times a day (Patient not taking: Reported on 2024), Disp:  90 capsule, Rfl: 1    ergocalciferol (VITAMIN D2) 50,000 units, Take 1 capsule (50,000 Units total) by mouth once a week, Disp: 90 capsule, Rfl: 3    folic acid (FOLVITE) 1 mg tablet, Take 1 tablet (1 mg total) by mouth in the morning, Disp: 90 tablet, Rfl: 3    gabapentin (NEURONTIN) 300 mg capsule, Take 1 pills in the morning, 1 pill at lunch and 2 pills before bed, Disp: 120 capsule, Rfl: 0    hydrocortisone 1 % ointment, , Disp: , Rfl:     ibuprofen (MOTRIN) 100 mg/5 mL suspension, Take 20 mL (400 mg total) by mouth every 6 (six) hours as needed for moderate pain (Patient not taking: Reported on 12/20/2024), Disp: 473 mL, Rfl: 0    lidocaine-prilocaine (EMLA) cream, Apply topically as needed for mild pain (Patient not taking: Reported on 12/6/2024), Disp: 30 g, Rfl: 3    losartan (COZAAR) 50 mg tablet, Take 1 tablet (50 mg total) by mouth daily, Disp: 90 tablet, Rfl: 5    magnesium Oxide (MAG-OX) 400 mg TABS, 1 tablet (400 mg total) by Per G Tube route 2 (two) times a day, Disp: 60 tablet, Rfl: 0    mirtazapine (REMERON) 15 mg tablet, Take 1 tablet (15 mg total) by mouth daily at bedtime Patient is also on a 30 mg tablet, for a total dose of 45 mg, Disp: 30 tablet, Rfl: 0    mirtazapine (REMERON) 30 mg tablet, Take 1 tablet (30 mg total) by mouth daily at bedtime, Disp: 30 tablet, Rfl: 0    nystatin (MYCOSTATIN) 500,000 units/5 mL suspension, Apply 5 mL (500,000 Units total) to the mouth or throat 4 (four) times a day for 7 days, Disp: 473 mL, Rfl: 0    omeprazole (PriLOSEC) 20 mg delayed release capsule, Take 1 capsule (20 mg total) by mouth daily, Disp: 30 capsule, Rfl: 5    ondansetron (ZOFRAN) 8 mg tablet, Take 1 tablet (8 mg total) by mouth every 8 (eight) hours as needed for nausea or vomiting, Disp: 90 tablet, Rfl: 2    oxyCODONE (ROXICODONE) 5 mg/5 mL solution, Take 5 mL (5 mg total) by mouth every 6 (six) hours as needed for severe pain ((breakthrough pain)) Max Daily Amount: 20 mg, Disp: 473 mL, Rfl:  0    potassium chloride (Klor-Con M20) 20 mEq tablet, Take 1 tablet (20 mEq total) by mouth 2 (two) times a day (Patient not taking: Reported on 12/20/2024), Disp: 90 tablet, Rfl: 1    potassium chloride 10% oral solution, 15 mL (20 mEq total) by Per G Tube route 2 (two) times a day, Disp: 473 mL, Rfl: 0    prochlorperazine (COMPAZINE) 10 mg tablet, Take 1 tablet (10 mg total) by mouth every 6 (six) hours as needed for nausea or vomiting, Disp: 90 tablet, Rfl: 2    pyridoxine (VITAMIN B6) 50 mg tablet, Take 1 tablet (50 mg total) by mouth daily, Disp: 90 tablet, Rfl: 3    vitamin B-12 (VITAMIN B-12) 1,000 mcg tablet, , Disp: , Rfl:   No current facility-administered medications for this visit.    Facility-Administered Medications Ordered in Other Visits:     alteplase (CATHFLO) injection 2 mg, 2 mg, Intracatheter, Q1MIN PRN, Harika Agostino, DO    alteplase (CATHFLO) injection 2 mg, 2 mg, Intracatheter, Q1MIN PRN, Harika Agostino, DO, 2 mg at 01/17/25 1204    sodium chloride 0.9 % bolus 1,000 mL, 1,000 mL, Intravenous, Once PRN, Harika Agostino, DO    Most Recent Lab Results:   Lab Results   Component Value Date    WBC 3.06 (L) 01/15/2025    NEUTROABS 2.45 01/15/2025    IRON 60 11/01/2024    TIBC 177 (L) 11/01/2024    FERRITIN 533 (H) 11/01/2024    CHOLESTEROL 167 04/24/2024    TRIG 137 04/24/2024    HDL 43 (L) 04/24/2024    LDLCALC 97 04/24/2024    ALT 8 01/15/2025    AST 27 01/15/2025    ALB 3.3 (L) 01/15/2025    SODIUM 140 01/15/2025    SODIUM 140 01/13/2025    K 3.3 (L) 01/15/2025    K 3.5 01/13/2025     01/15/2025    BUN 20 01/15/2025    BUN 22 01/13/2025    CREATININE 0.95 01/15/2025    CREATININE 0.98 01/13/2025    EGFR 61 01/15/2025    PHOS 2.8 11/01/2024    PHOS 3.3 10/30/2024    TSH 1.58 01/03/2022    GLUCOSE 209 (H) 03/12/2024    POCGLU 89 12/27/2024    GLUF 102 (H) 10/30/2024    GLUF 95 09/13/2024    GLUC 117 01/15/2025    HGBA1C 5.5 02/21/2024    HGBA1C 5.9 (H) 06/13/2023    HGBA1C 6.1 (H)  "07/09/2022    CALCIUM 9.1 01/15/2025    FOLATE >20.0 (H) 05/23/2019    MG 2.0 01/15/2025       Anthropometric Measurements:   Height: 66\"  Ht Readings from Last 1 Encounters:   01/08/25 5' 5.98\" (1.676 m)     Wt Readings from Last 20 Encounters:   01/10/25 77.1 kg (170 lb)   01/08/25 76.5 kg (168 lb 10.4 oz)   12/31/24 82.6 kg (182 lb)   12/20/24 82.1 kg (181 lb)   12/19/24 84.4 kg (186 lb)   12/06/24 84.4 kg (186 lb)   11/26/24 82.2 kg (181 lb 4 oz)   10/29/24 86.2 kg (190 lb)   10/24/24 86.6 kg (190 lb 14.7 oz)   10/21/24 86.6 kg (190 lb 14.7 oz)   10/15/24 88.5 kg (195 lb)   10/14/24 87.1 kg (192 lb)   10/10/24 89.3 kg (196 lb 13.9 oz)   10/09/24 89.4 kg (197 lb 1.5 oz)   10/07/24 87 kg (191 lb 12.8 oz)   10/02/24 88.9 kg (195 lb 15.8 oz)   10/01/24 89.4 kg (197 lb)   09/30/24 88 kg (194 lb 0.1 oz)   09/23/24 88.2 kg (194 lb 7.1 oz)   09/17/24 90 kg (198 lb 6.6 oz)       Weight History:   Usual Weight: 275#  Varian: (2/22/19) 264.6#, (4/2/19) 263.4, (9/16/24) 197.4#, (9/23/24) 194#, (9/30/24) 192.8#, (10/7/24) 190.2#, (10/11/24) 194.6#, (10/14/24) 191#, (10/17/24) 192.8#, (10/21/24) 190#, (10/24/24) 190#, (10/28/24) 189#, (11/11/24) 185#  Home Scale: n/a    Oncology Nutrition-Anthropometrics      Flowsheet Row Nutrition from 1/17/2025 in [facility] Nutrition from 11/22/2024 in [facility]   Patient [age] (years): 69 years 69 years   Patient (female) height (in): 66 in 66 in   Current Weight to be used for anthropometric calculations (lbs) 170 lbs --  [no updated weight in EPIC]   Current Weight to be used for anthropometric calculations (kg) 77.3 kg --   BMI: 27.4 --   IBW female: 130 lbs 130 lbs   IBW (kg) female: 59.1 kg 59.1 kg   IBW % (female) 130.8 % --   Adjusted BW (female): 140 lbs --   Adjusted BW kg (female): 63.6 kg --   % weight change after 1 month: -8.6 % --   Weight change after 1 month (lbs) -16 lbs --   % weight change " after 3 months: -13.7 % --   Weight change after 3 months (lbs) -26.9 lbs --            Nutrition-Focused Physical Findings: none observed    Food/Nutrition-Related History & Client/Social History:    Current Nutrition Impact Symptoms:  [] Nausea  [x] Reduced Appetite  [] Acid Reflux    [] Vomiting  [x] Unintended Wt Loss [] Malabsorption    [x] Diarrhea - worse after administer Jevity 1.5  Anti-diarrheals helping  [] Unintended Wt Gain  [] Dumping Syndrome    [] Constipation  [] Thick Mucous/Secretions  [] Abdominal Pain    [] Dysgeusia (Altered Taste)  [x] Xerostomia (Dry Mouth)  [] Gas    [] Dysosmia (Altered Smell)  [] Thrush  [] Difficulty Chewing    [] Oral Mucositis (Sore Mouth)  [x] Fatigue  [] Hyperglycemia   Lab Results   Component Value Date    HGBA1C 5.5 2024      [x] Odynophagia  [] Esophagitis  [] Other:    [x] Dysphagia -enteral nutrition as 1' source of nutrition  Pureed  per SLP [] Early Satiety  [] No Problems Eating      Food Allergies & Intolerances: no    Current Diet: Tube Feeding, pureed foods  Current Nutrition Intake: Decreased since last visit  Appetite: Fair   Nutrition Route: PEG primarily, some PO  Oral Care: brushes daily  Activity level: ADLs.     24 Hr Diet Recall:   Ensure (ran out)  Small bites of pureed foods; working with SLP     Beverages: water (sips throughout the day)  Supplements:   Ensure Complete (10 oz, 350 kcal, 30 g pro) occasionally    EN Recall:  DME: [employer]  Access Type: PEG  Formula: Jevity 1.5 - not tolerating  Method: Bolus  PRIOR Regimen: 16oz (474 mL) 3 times per day of Jevity 1.5 via PEG (gravity syringe method to administer boluses; 1 container infusing over ~15-20 minutes).  Flush: 60 mL free water flush  pre/post each bolus (120 mL x 3 boluses = 360 mL), + additional 600 mL throughout the day  EN providin mL volume (6 cartons/day), 2133 kcal (100% est needs), 91g protein (100% est needs), 1081 mL free water, 1441 mL total water (72% est  needs).    Formula: Shayy Adbrain Peptide 1.5 (doing 2x/day) for the past few days  Regimen: 11oz (325 mL) 2 times per day of [place] [place] 1.5 via PEG (gravity syringe method to administer boluses; 1 container infusing over ~15-20 minutes).  Flush: 60 mL free water flush  pre/post each bolus (120 mL x 2 boluses = 240 mL)  EN providin mL volume (2 cartons/day), 1,000 kcal (50% est needs), 48g protein (60% est needs), 455 mL free water, 695 mL total water (35% est needs).      Oncology Nutrition-Estimated Needs      Flowsheet Row Nutrition from 2025 in [facility] Oncology Dietitian Services Nutrition from 2024 in [facility] [facility]   Weight type used Actual weight Adjusted weight   Weight in kilograms (kg) used for estimated needs 77.3 kg 66.4 kg   Energy needs formula:  30-35 kcal/kg 30-35 kcal/kg   Energy needs based on 30 kcal/k kcal  kcal   Energy needs based on 35 kcal/k kcal 2323 kcal   Protein needs formula: 1.2-1.5 g/kg 1.2-1.5 g/kg   Protein needs based on 1.2 g/k g 80 g   Protein needs based on 1.5 g/kg 116 g 100 g   Fluid needs formula: 30-35 mL/kg 30-35 mL/kg   Fluid needs based on 30 mL/kg 2319 mL 1992 mL   Fluid needs in ounces 78 oz 67 oz   Fluid needs based on 35 mL/kg 2706 mL 2324 mL   Fluid needs in ounces 91 oz 79 oz             Discussion & Intervention:   Maira was evaluated today for an RD follow up regarding HNC.  Maira has completed tx for HNC and is currently undergoing tx for metastatic colon cancer .  Spoke with Maira in the infusion center today while there for hydration. Pt noted to have intolerance to Jevity 1.5 lately with subsequent weight loss from not doing tube feeds. This RD sent patient [employer] Peptide 1.5 given stomach aches and worsening diarrhea with Jevity 1.5. She reports that her GI upset has improved with the peptide formula as well as her diarrhea. She is also using cholestyramine  which seems to be helping with diarrhea as well. She continues to complain of food feeling stuck and trouble swallowing. She has been working with SLP. She is eating small amount of pureed foods but not much. Trying to hydrate as best as she can.    Reviewed 24 hour recall, which revealed an inadequate enteral intake, and discussed ways to optimize nutrient intake.  Also reviewed the importance of wt management throughout the tx process and the role of enteral nutrition in managing wt and overall health.  Based on today's assessment, discussion included: MNT for:   fluid, pureed diet,  enteral nutrition, practicing proper oral care, adequate hydration & fluid choices, utilizing oral nutrition supplements, practicing proper feeding tube use & care, adherence to and compliance with enteral nutrition plan of care, and individualized enteral nutrition recommendations & plan:   .   Moving forward, Maira was encouraged to continue current enteral nutrition plan of care   Materials Provided: Ensure samples and Shayy Digitel samples  All questions and concerns addressed during today’s visit.  Maira has RD contact information.    Nutrition Diagnosis:   Increased Nutrient Needs (kcal & pro) related to increased demand for nutrients and disease state as evidenced by cancer dx and pt undergoing tx for cancer.  Swallowing Difficulty related to diagnosis/treatment related causes as evidenced by  need for feeding tube, diet restriction per SLP.  Monitoring & Evaluation:   Goals:  weight maintenance/stabilization  adequate nutrition impact symptom management  pt to meet >/=75% estimated nutrition needs daily  achieve tube feeding goal rate    Progress Towards Goals: Progressing    Nutrition Rx & Recommendations:  Diet: enteral nutrition as 1' source of nutrition  Stay hydrated by sipping fluids of choice/tolerance throughout the day.    Follow proper oral care; Try baking soda/salt water rinse recipe (mix 3/4 tsp salt + 1 tsp  baking soda + 1 qt water; rinse with plain water after using) in Eating Hints book (pg 18).  Brush your teeth before/after meals & before bed.  Weigh yourself regularly. If you notice weight loss, make an effort to increase your daily food/calorie intake. If you continue to notice loss after these efforts, reach out to your dietitian to establish a plan to stabilize weight.   Continue drinking Ensure Plus/Complete one per day as able   Pureed foods as able  Pureed Foods:  To puree foods: placed chopped food into  or . Add enough liquid to cover the blade. Blend until food is smooth and free of chunks.  You may add non-fat milk, clear broths or non-fat gravies when blending foods.  You can use baby food as long as you have a pureed protein source as the main part of your meal.  Avoid items with added sugars (read food labels).  Ideas for pureed foods: yogurt, cottage cheese, jello, pureed soups, applesauce, pureed fruit, baby food vegetables/pureed vegetables, mashed potatoes, oatmeal or cream of wheat (make with milk for more calories/protein)  If able to consume soft/moist foods, recommend : Soft/easy to swallow foods that are high in calories and protein: casseroles, chicken/egg/tuna salad with extra garcia to add calories and moisture, oatmeal/cream of wheat made with whole milk, cottage cheese, whole milk Greek yogurt, scrambled eggs with cheese, avocado, macaroni and cheese, mashed potatoes made with butter and whole milk or dry milk powder, ground meat with extra sauce/gravy, canned fruit, peanut butter, cream soups (cream of chicken, cream of mushroom, broccoli cheddar), pudding made with whole milk, custard, ice cream, banana, milkshakes/smoothies, Review page 47 of Eating Hints Book for more ideas.     TF plan - adjust to peptide based formula in light of worsening diarrhea, weight loss, intolerance to standard formula  TF Goal: 1 containers (325 mL) 4 times daily of gokit Peptide  1.5  OR 1 + 1/3 carton (434 mL) 3x/day if want to reduce quantity of feeds   Water Flushin mL free water flush pre/post each bolus (480 mL water) + additional 180 mL water flushes 4 times daily (720 mL water); adjusting prn on IV hydration days  TF provides: 1300 mL total volume (4 cartons), 2000 kcal, 96g protein, 910 mL free water, 2110 mL total fluid   Contact RD for substitutions  Your syringes are each 60 mL or 2 fl oz.  Always flush your feeding tube with 60 mL room-temp water 1-2 times daily while feeding tube is not in use.  Call [facility] when you have 10 days left of TF supplies: [phone], option 3 (customer support).      Follow Up Plan:  in 2-3 weeks during infusion  Recommend Referral to Other Providers: none at this time

## 2025-01-17 NOTE — PATIENT INSTRUCTIONS
Nutrition Rx & Recommendations:  Diet: enteral nutrition as 1' source of nutrition  Stay hydrated by sipping fluids of choice/tolerance throughout the day.    Follow proper oral care; Try baking soda/salt water rinse recipe (mix 3/4 tsp salt + 1 tsp baking soda + 1 qt water; rinse with plain water after using) in Eating Hints book (pg 18).  Brush your teeth before/after meals & before bed.  Weigh yourself regularly. If you notice weight loss, make an effort to increase your daily food/calorie intake. If you continue to notice loss after these efforts, reach out to your dietitian to establish a plan to stabilize weight.   Continue drinking Ensure Plus/Complete one per day as able   Pureed foods as able  Pureed Foods:  To puree foods: placed chopped food into  or . Add enough liquid to cover the blade. Blend until food is smooth and free of chunks.  You may add non-fat milk, clear broths or non-fat gravies when blending foods.  You can use baby food as long as you have a pureed protein source as the main part of your meal.  Avoid items with added sugars (read food labels).  Ideas for pureed foods: yogurt, cottage cheese, jello, pureed soups, applesauce, pureed fruit, baby food vegetables/pureed vegetables, mashed potatoes, oatmeal or cream of wheat (make with milk for more calories/protein)  If able to consume soft/moist foods, recommend : Soft/easy to swallow foods that are high in calories and protein: casseroles, chicken/egg/tuna salad with extra garcia to add calories and moisture, oatmeal/cream of wheat made with whole milk, cottage cheese, whole milk Greek yogurt, scrambled eggs with cheese, avocado, macaroni and cheese, mashed potatoes made with butter and whole milk or dry milk powder, ground meat with extra sauce/gravy, canned fruit, peanut butter, cream soups (cream of chicken, cream of mushroom, broccoli cheddar), pudding made with whole milk, custard, ice cream, banana,  milkshakes/smoothies, Review page 47 of Eating Hints Book for more ideas.      TF plan - adjust to peptide based formula in light of worsening diarrhea, weight loss, intolerance to standard formula  TF Goal: 1 containers (325 mL) 4 times daily of [employer] Peptide 1.5  OR 1 + 1/3 carton (434 mL) 3x/day if want to reduce quantity of feeds   Water Flushin mL free water flush pre/post each bolus (480 mL water) + additional 180 mL water flushes 4 times daily (720 mL water); adjusting prn on IV hydration days  TF provides: 1300 mL total volume (4 cartons), 2000 kcal, 96g protein, 910 mL free water, 2110 mL total fluid   Contact RD for substitutions  Your syringes are each 60 mL or 2 fl oz.  Always flush your feeding tube with 60 mL room-temp water 1-2 times daily while feeding tube is not in use.  Call [facility] when you have 10 days left of TF supplies: [phone], option 3 (customer support).     Follow Up Plan:  in 2-3 weeks (2/5 during infusion)  Recommend Referral to Other Providers: none at this time

## 2025-01-17 NOTE — PROGRESS NOTES
Patient presents for hydration today and labs and is without complaints. Patient's port initially did not have blood return. Cathflo instilled per protocol. Positive blood return noted after 30 minutes. Labs then drawn per protocol. Patient tolerated infusion without incident. Declines AVS. Aware of next appointment on 1/20 @ 1230pm..

## 2025-01-20 ENCOUNTER — HOSPITAL ENCOUNTER (OUTPATIENT)
Dept: INFUSION CENTER | Facility: CLINIC | Age: 70
Discharge: HOME | End: 2025-01-20
Payer: MEDICARE

## 2025-01-20 VITALS
RESPIRATION RATE: 18 BRPM | HEART RATE: 109 BPM | DIASTOLIC BLOOD PRESSURE: 74 MMHG | SYSTOLIC BLOOD PRESSURE: 122 MMHG | TEMPERATURE: 97 F

## 2025-01-20 DIAGNOSIS — Z17.0 MALIGNANT NEOPLASM OF UPPER-OUTER QUADRANT OF RIGHT BREAST IN FEMALE, ESTROGEN RECEPTOR POSITIVE (HCC): ICD-10-CM

## 2025-01-20 DIAGNOSIS — C18.9 METASTATIC COLON CANCER TO LIVER (HCC): Primary | ICD-10-CM

## 2025-01-20 DIAGNOSIS — C78.7 METASTATIC COLON CANCER TO LIVER (HCC): Primary | ICD-10-CM

## 2025-01-20 DIAGNOSIS — C50.411 MALIGNANT NEOPLASM OF UPPER-OUTER QUADRANT OF RIGHT BREAST IN FEMALE, ESTROGEN RECEPTOR POSITIVE (HCC): ICD-10-CM

## 2025-01-20 LAB
ALBUMIN SERPL BCG-MCNC: 3.4 G/DL (ref 3.5–5)
ALP SERPL-CCNC: 86 U/L (ref 34–104)
ALT SERPL W P-5'-P-CCNC: 9 U/L (ref 7–52)
ANION GAP SERPL CALCULATED.3IONS-SCNC: 6 MMOL/L (ref 4–13)
AST SERPL W P-5'-P-CCNC: 21 U/L (ref 13–39)
BASOPHILS # BLD AUTO: 0.01 THOUSANDS/ΜL (ref 0–0.1)
BASOPHILS NFR BLD AUTO: 1 % (ref 0–1)
BILIRUB SERPL-MCNC: 0.34 MG/DL (ref 0.2–1)
BUN SERPL-MCNC: 19 MG/DL (ref 5–25)
CALCIUM ALBUM COR SERPL-MCNC: 9.8 MG/DL (ref 8.3–10.1)
CALCIUM SERPL-MCNC: 9.3 MG/DL (ref 8.4–10.2)
CHLORIDE SERPL-SCNC: 104 MMOL/L (ref 96–108)
CO2 SERPL-SCNC: 29 MMOL/L (ref 21–32)
CREAT SERPL-MCNC: 0.94 MG/DL (ref 0.6–1.3)
EOSINOPHIL # BLD AUTO: 0.06 THOUSAND/ΜL (ref 0–0.61)
EOSINOPHIL NFR BLD AUTO: 3 % (ref 0–6)
ERYTHROCYTE [DISTWIDTH] IN BLOOD BY AUTOMATED COUNT: 14.1 % (ref 11.6–15.1)
GFR SERPL CREATININE-BSD FRML MDRD: 62 ML/MIN/1.73SQ M
GLUCOSE SERPL-MCNC: 119 MG/DL (ref 65–140)
HCT VFR BLD AUTO: 22.5 % (ref 34.8–46.1)
HGB BLD-MCNC: 7.5 G/DL (ref 11.5–15.4)
IMM GRANULOCYTES # BLD AUTO: 0 THOUSAND/UL (ref 0–0.2)
IMM GRANULOCYTES NFR BLD AUTO: 0 % (ref 0–2)
LYMPHOCYTES # BLD AUTO: 0.43 THOUSANDS/ΜL (ref 0.6–4.47)
LYMPHOCYTES NFR BLD AUTO: 19 % (ref 14–44)
MAGNESIUM SERPL-MCNC: 1.7 MG/DL (ref 1.9–2.7)
MCH RBC QN AUTO: 31.9 PG (ref 26.8–34.3)
MCHC RBC AUTO-ENTMCNC: 33.3 G/DL (ref 31.4–37.4)
MCV RBC AUTO: 96 FL (ref 82–98)
MONOCYTES # BLD AUTO: 0.16 THOUSAND/ΜL (ref 0.17–1.22)
MONOCYTES NFR BLD AUTO: 7 % (ref 4–12)
NEUTROPHILS # BLD AUTO: 1.56 THOUSANDS/ΜL (ref 1.85–7.62)
NEUTS SEG NFR BLD AUTO: 70 % (ref 43–75)
NRBC BLD AUTO-RTO: 0 /100 WBCS
PLATELET # BLD AUTO: 163 THOUSANDS/UL (ref 149–390)
PMV BLD AUTO: 10.6 FL (ref 8.9–12.7)
POTASSIUM SERPL-SCNC: 3.6 MMOL/L (ref 3.5–5.3)
PROT SERPL-MCNC: 7.4 G/DL (ref 6.4–8.4)
RBC # BLD AUTO: 2.35 MILLION/UL (ref 3.81–5.12)
SODIUM SERPL-SCNC: 139 MMOL/L (ref 135–147)
WBC # BLD AUTO: 2.22 THOUSAND/UL (ref 4.31–10.16)

## 2025-01-20 PROCEDURE — 85025 COMPLETE CBC W/AUTO DIFF WBC: CPT | Performed by: INTERNAL MEDICINE

## 2025-01-20 PROCEDURE — 80053 COMPREHEN METABOLIC PANEL: CPT | Performed by: INTERNAL MEDICINE

## 2025-01-20 PROCEDURE — 83735 ASSAY OF MAGNESIUM: CPT | Performed by: INTERNAL MEDICINE

## 2025-01-20 PROCEDURE — 96360 HYDRATION IV INFUSION INIT: CPT

## 2025-01-20 RX ADMIN — SODIUM CHLORIDE 1000 ML: 9 INJECTION, SOLUTION INTRAVENOUS at 13:04

## 2025-01-20 NOTE — PROGRESS NOTES
Daily Speech Treatment Note    Today's date: 2025 ***  Patient’s name: Maira Moura  : 1955  MRN: [id number]  Safety measures: HTN, CVA, GERD, active CA, s/p PEG tube placement, aspiration precautions  Current recommended diet: PEG tube for primary nutrition/hydration; puree with thin liquids P.O. as tolerated  Referring provider: Harika Medina DO Encounter Diagnosis     ICD-10-CM    1. Dysphagia, oropharyngeal phase  R13.12       2. Status post tonsillectomy  Z90.89       3. 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         Visit Tracking:  ***    Subjective/Behavioral:  -***    Objective/Assessment:  -Reviewed patient's home exercises/activities completed since last appointment. ***    Short-term goals:  Patient will receive a videofluoroscopic swallow study (VFSS) to assess her current swallowing function to r/o penetration or aspiration, to determine the safest, least restrictive diet, and to recommended the appropriate rehabilitation exercises (to be achieved in 2-3 weeks).      Patient will be educated on safe swallowing compensatory strategies (e.g., upright posture, small bites/sips, slow rate, alternation of consistencies, multiple swallows, effortful swallow, etc.) to facilitate increased safety with P.O. intake (to be achieved in 4-6 weeks).      Patient will tolerate P.O. trials of her recommended diet with the implementation of safe swallowing strategies without overt s/sx of penetration and/or aspiration during skilled therapy sessions in this certification period (to be achieved in 4-6 weeks).      Patient will perform oral motor exercises using IOPI device on lingual muscles to facilitate increased function and strength by 25% for improved swallowing function (to be achieved in 6-8 weeks).     Patient will independently complete pharyngeal strengthening exercises (e.g., Effortful swallow, Mendelsohn, Evelia) daily to facilitate increased pharyngeal strength and  coordination (to be achieved in 4-6 weeks).     Plan:  -Patient was provided with home exercises/activities to target goals in plan of care at the end of today's session.  -Continue with current plan of care.

## 2025-01-20 NOTE — PROGRESS NOTES
Labs drawn without complication and sent. Tolerated hydration. AVS declined, aware of future appointment on 1/22 @ 11:00, left unit in stable condition.

## 2025-01-21 ENCOUNTER — TELEPHONE (OUTPATIENT)
Dept: HEMATOLOGY ONCOLOGY | Facility: CLINIC | Age: 70
End: 2025-01-21

## 2025-01-21 ENCOUNTER — TELEPHONE (OUTPATIENT)
Age: 70
End: 2025-01-21

## 2025-01-21 ENCOUNTER — OFFICE VISIT (OUTPATIENT)
Dept: SPEECH THERAPY | Facility: CLINIC | Age: 70
End: 2025-01-21
Payer: MEDICARE

## 2025-01-21 DIAGNOSIS — C09.9 RIGHT TONSILLAR SQUAMOUS CELL CARCINOMA (HCC): ICD-10-CM

## 2025-01-21 DIAGNOSIS — R13.12 DYSPHAGIA, OROPHARYNGEAL PHASE: Primary | ICD-10-CM

## 2025-01-21 DIAGNOSIS — Z90.89 STATUS POST TONSILLECTOMY: ICD-10-CM

## 2025-01-21 PROCEDURE — 92526 ORAL FUNCTION THERAPY: CPT | Performed by: SPEECH-LANGUAGE PATHOLOGIST

## 2025-01-21 RX ORDER — SODIUM CHLORIDE 9 MG/ML
20 INJECTION, SOLUTION INTRAVENOUS ONCE
Status: CANCELLED | OUTPATIENT
Start: 2025-01-22

## 2025-01-21 RX ORDER — ATROPINE SULFATE 1 MG/ML
0.25 INJECTION, SOLUTION INTRAVENOUS ONCE
Status: CANCELLED | OUTPATIENT
Start: 2025-01-22

## 2025-01-21 RX ORDER — FLUOROURACIL 50 MG/ML
400 INJECTION, SOLUTION INTRAVENOUS ONCE
Status: CANCELLED | OUTPATIENT
Start: 2025-01-22

## 2025-01-21 RX ORDER — ATROPINE SULFATE 1 MG/ML
0.25 INJECTION, SOLUTION INTRAVENOUS ONCE AS NEEDED
Status: CANCELLED | OUTPATIENT
Start: 2025-01-22

## 2025-01-21 NOTE — TELEPHONE ENCOUNTER
Called PT for missed appt with Rashmi, No answer, LVM with hopeline number and asked PT tp call back and reschedule.

## 2025-01-21 NOTE — PROGRESS NOTES
Daily Speech Treatment Note    Today's date: 2025  Patient’s name: Maira Moura  : 1955  MRN: [id number]  Safety measures: HTN, CVA, GERD, active CA, s/p PEG tube placement, aspiration precautions  Current recommended diet: PEG tube for primary nutrition/hydration; puree with thin liquids P.O. as tolerated  Referring provider: Harika Medina,     Encounter Diagnoses   Name Primary?    Dysphagia, oropharyngeal phase Yes    Status post tonsillectomy     Right tonsillar squamous cell carcinoma (HCC)        Visit Tracking:  POC   Expires Auth Expiration Date ST Visit Limit   2025 or 10th visit 2025 BOMN          Visit/Unit Tracking:  Auth Status Date 25  IE 25   Not required Used 1 2 3 4    Remaining 9 8 7 6     Subjective/Behavioral: Patient arrived 8 minutes late to appointment. There was brief miscommunication because her  called earlier in day to cancel appointment. Session began 1:15 due to this.  She reports less choking and coughing with liquids since last session. She notes some improvement overall.     Objective/Assessment:     Short-term goals:  Patient will receive a videofluoroscopic swallow study (VFSS) to assess her current swallowing function to r/o penetration or aspiration, to determine the safest, least restrictive diet, and to recommended the appropriate rehabilitation exercises (to be achieved in 2-3 weeks). MET    Patient will be educated on safe swallowing compensatory strategies (e.g., upright posture, small bites/sips, slow rate, alternation of consistencies, multiple swallows, effortful swallow, etc.) to facilitate increased safety with P.O. intake (to be achieved in 4-6 weeks).      Patient will tolerate P.O. trials of her recommended diet with the implementation of safe swallowing strategies without overt s/sx of penetration and/or aspiration during skilled therapy sessions in this certification period (to be achieved in 4-6  weeks).     Patient drank small sips of water throughout session to aid in initiation of swallowing for exercises below. No overt s/sx of penetration/aspiration observed in session today.    Patient will perform oral motor exercises using IOPI device on lingual muscles to facilitate increased function and strength by 25% for improved swallowing function (to be achieved in 6-8 weeks).     She forgot IOPI bulb so this may be targeted next session. She was reminded to bring it for next session this week.    Patient will independently complete pharyngeal strengthening exercises (e.g., Effortful swallow, Mendelsohn, Evelia) daily to facilitate increased pharyngeal strength and coordination (to be achieved in 4-6 weeks).   Mendelsohn maneuver, Evelia maneuver and effortful swallow reviewed.   Effortful swallow: Patient completed 10 repetitions, 2 sets  Mendelsohn: Patient completed 10 repetitions, 2 sets  Evelia maneuver: Patient completed 10 repetitions, 2 sets  Sips of water between trials of Evelia maneuver helpful for patient who noted some xerostoma symptoms today.  Good demonstration of each exercise with minimal review of technique. Review of rationale for each exercise provided.    She rated her fatigue after both sets of each exercise at a 5/10, 10 being the most. Denies odinophagia.     Plan:  -Patient was provided with home exercises/activities to target goals in plan of care at the end of today's session.  -Continue with current plan of care.

## 2025-01-21 NOTE — TELEPHONE ENCOUNTER
Spoke with patient to inform her of virtual appointment with Rashmi tomorrow, 1/22, at 11am. Informed patient she can log in while at infusion. Recommended she log in 10-15min early. Patient verbalized understanding.

## 2025-01-22 ENCOUNTER — TELEMEDICINE (OUTPATIENT)
Dept: HEMATOLOGY ONCOLOGY | Facility: CLINIC | Age: 70
End: 2025-01-22
Payer: MEDICARE

## 2025-01-22 ENCOUNTER — HOSPITAL ENCOUNTER (OUTPATIENT)
Dept: INFUSION CENTER | Facility: CLINIC | Age: 70
Discharge: HOME | End: 2025-01-22
Payer: MEDICARE

## 2025-01-22 VITALS
RESPIRATION RATE: 18 BRPM | TEMPERATURE: 97 F | DIASTOLIC BLOOD PRESSURE: 86 MMHG | BODY MASS INDEX: 26.79 KG/M2 | HEIGHT: 66 IN | HEART RATE: 93 BPM | WEIGHT: 166.67 LBS | SYSTOLIC BLOOD PRESSURE: 131 MMHG

## 2025-01-22 DIAGNOSIS — C78.7 METASTATIC COLON CANCER TO LIVER (HCC): ICD-10-CM

## 2025-01-22 DIAGNOSIS — C18.9 METASTATIC COLON CANCER TO LIVER (HCC): Primary | ICD-10-CM

## 2025-01-22 DIAGNOSIS — Z17.0 MALIGNANT NEOPLASM OF UPPER-OUTER QUADRANT OF RIGHT BREAST IN FEMALE, ESTROGEN RECEPTOR POSITIVE (HCC): ICD-10-CM

## 2025-01-22 DIAGNOSIS — C09.9 RIGHT TONSILLAR SQUAMOUS CELL CARCINOMA (HCC): ICD-10-CM

## 2025-01-22 DIAGNOSIS — C78.7 METASTATIC COLON CANCER TO LIVER (HCC): Primary | ICD-10-CM

## 2025-01-22 DIAGNOSIS — T45.1X5A ANTINEOPLASTIC CHEMOTHERAPY INDUCED ANEMIA: ICD-10-CM

## 2025-01-22 DIAGNOSIS — C18.9 METASTATIC COLON CANCER TO LIVER (HCC): ICD-10-CM

## 2025-01-22 DIAGNOSIS — Z17.0 MALIGNANT NEOPLASM OF UPPER-OUTER QUADRANT OF RIGHT BREAST IN FEMALE, ESTROGEN RECEPTOR POSITIVE (HCC): Primary | ICD-10-CM

## 2025-01-22 DIAGNOSIS — C50.411 MALIGNANT NEOPLASM OF UPPER-OUTER QUADRANT OF RIGHT BREAST IN FEMALE, ESTROGEN RECEPTOR POSITIVE (HCC): Primary | ICD-10-CM

## 2025-01-22 DIAGNOSIS — D64.81 ANTINEOPLASTIC CHEMOTHERAPY INDUCED ANEMIA: ICD-10-CM

## 2025-01-22 DIAGNOSIS — C50.411 MALIGNANT NEOPLASM OF UPPER-OUTER QUADRANT OF RIGHT BREAST IN FEMALE, ESTROGEN RECEPTOR POSITIVE (HCC): ICD-10-CM

## 2025-01-22 LAB
ABO GROUP BLD: NORMAL
BLD GP AB SCN SERPL QL: NEGATIVE
RH BLD: POSITIVE
SPECIMEN EXPIRATION DATE: NORMAL

## 2025-01-22 PROCEDURE — 86901 BLOOD TYPING SEROLOGIC RH(D): CPT

## 2025-01-22 PROCEDURE — 96375 TX/PRO/DX INJ NEW DRUG ADDON: CPT

## 2025-01-22 PROCEDURE — 96413 CHEMO IV INFUSION 1 HR: CPT

## 2025-01-22 PROCEDURE — 96411 CHEMO IV PUSH ADDL DRUG: CPT

## 2025-01-22 PROCEDURE — 96368 THER/DIAG CONCURRENT INF: CPT

## 2025-01-22 PROCEDURE — 86900 BLOOD TYPING SEROLOGIC ABO: CPT

## 2025-01-22 PROCEDURE — 99213 OFFICE O/P EST LOW 20 MIN: CPT

## 2025-01-22 PROCEDURE — 86850 RBC ANTIBODY SCREEN: CPT

## 2025-01-22 PROCEDURE — 96367 TX/PROPH/DG ADDL SEQ IV INF: CPT

## 2025-01-22 PROCEDURE — G0498 CHEMO EXTEND IV INFUS W/PUMP: HCPCS

## 2025-01-22 RX ORDER — SODIUM CHLORIDE 9 MG/ML
20 INJECTION, SOLUTION INTRAVENOUS ONCE
Status: COMPLETED | OUTPATIENT
Start: 2025-01-22 | End: 2025-01-22

## 2025-01-22 RX ORDER — SODIUM CHLORIDE 9 MG/ML
20 INJECTION, SOLUTION INTRAVENOUS ONCE
Status: CANCELLED | OUTPATIENT
Start: 2025-01-24

## 2025-01-22 RX ORDER — FLUOROURACIL 50 MG/ML
400 INJECTION, SOLUTION INTRAVENOUS ONCE
Status: COMPLETED | OUTPATIENT
Start: 2025-01-22 | End: 2025-01-22

## 2025-01-22 RX ORDER — ATROPINE SULFATE 1 MG/ML
0.25 INJECTION, SOLUTION INTRAVENOUS ONCE AS NEEDED
Status: DISCONTINUED | OUTPATIENT
Start: 2025-01-22 | End: 2025-01-25 | Stop reason: HOSPADM

## 2025-01-22 RX ORDER — ATROPINE SULFATE 1 MG/ML
0.25 INJECTION, SOLUTION INTRAVENOUS ONCE
Status: COMPLETED | OUTPATIENT
Start: 2025-01-22 | End: 2025-01-22

## 2025-01-22 RX ADMIN — FLUOROURACIL 770 MG: 50 INJECTION, SOLUTION INTRAVENOUS at 14:25

## 2025-01-22 RX ADMIN — MAGNESIUM SULFATE HEPTAHYDRATE: 500 INJECTION, SOLUTION INTRAMUSCULAR; INTRAVENOUS at 11:14

## 2025-01-22 RX ADMIN — ATROPINE SULFATE 0.25 MG: 1 INJECTION, SOLUTION INTRAVENOUS at 12:46

## 2025-01-22 RX ADMIN — SODIUM CHLORIDE 20 ML/HR: 9 INJECTION, SOLUTION INTRAVENOUS at 11:14

## 2025-01-22 RX ADMIN — LEUCOVORIN CALCIUM 800 MG: 500 INJECTION, POWDER, LYOPHILIZED, FOR SOLUTION INTRAMUSCULAR; INTRAVENOUS at 12:48

## 2025-01-22 RX ADMIN — DEXAMETHASONE SODIUM PHOSPHATE: 10 INJECTION, SOLUTION INTRAMUSCULAR; INTRAVENOUS at 12:23

## 2025-01-22 RX ADMIN — IRINOTECAN HYDROCHLORIDE 180 MG: 20 INJECTION, SOLUTION INTRAVENOUS at 12:49

## 2025-01-22 NOTE — PLAN OF CARE
Problem: Knowledge Deficit  Goal: Patient/family/caregiver demonstrates understanding of disease process, treatment plan, medications, and discharge instructions  Description: Complete learning assessment and assess knowledge base.  Interventions:  - Provide teaching at level of understanding  - Provide teaching via preferred learning methods  1/22/2025 1214 by Tosha Campos RN  Outcome: Progressing  1/22/2025 1213 by Tosha Campos, RN  Outcome: Progressing

## 2025-01-22 NOTE — PROGRESS NOTES
Pt presents for irinotecan, leucovorin, 5FU push, 5FU BRANDIE. No new meds or concerns. Pt tolerated infusion without adverse reaction. BRANDIE connected per protocol. Pt is aware of trouble shooting, when to call [facility] and the office. Future appointments discussed, confirmed with pt on 1/24/25 1550. AVS declined.

## 2025-01-23 ENCOUNTER — OFFICE VISIT (OUTPATIENT)
Dept: SPEECH THERAPY | Facility: CLINIC | Age: 70
End: 2025-01-23
Payer: MEDICARE

## 2025-01-23 DIAGNOSIS — Z90.89 STATUS POST TONSILLECTOMY: ICD-10-CM

## 2025-01-23 DIAGNOSIS — R13.12 DYSPHAGIA, OROPHARYNGEAL PHASE: Primary | ICD-10-CM

## 2025-01-23 DIAGNOSIS — C09.9 RIGHT TONSILLAR SQUAMOUS CELL CARCINOMA (HCC): ICD-10-CM

## 2025-01-23 PROCEDURE — 92526 ORAL FUNCTION THERAPY: CPT | Performed by: SPEECH-LANGUAGE PATHOLOGIST

## 2025-01-23 NOTE — PROGRESS NOTES
Virtual Regular Visit  Name: Maira Moura      : 1955      MRN: [id number]  Encounter Provider: JOANN Martin  Encounter Date: 2025   Encounter department[facility]      Verification of patient location:  Patient is located at Home in the following state in which I hold an active license [place] :  Assessment & Plan  Antineoplastic chemotherapy induced anemia    Orders:    Type and screen; Future    Metastatic colon cancer to liver (HCC)    Right tonsillar squamous cell carcinoma (HCC)    Malignant neoplasm of upper-outer quadrant of right breast in female, estrogen receptor positive (HCC)         69-year-old female with metastatic adenocarcinoma of her colon, squamous cell carcinoma of her right tonsil, stage Ia right-sided breast cancer.  On her last imaging, patient had progression of her colon cancer and was started on FOLFIRI had a 50% dose reduction of the irinotecan due to existing diarrhea.  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    At this time, we will proceed with cycle 2 of FOLFIRI at current dose.  Patient is very fatigued, which could be stemming from her chemotherapy as well as anemia therefore, on her disconnect day, we will transfuse 1U PRBCs.  Nursing staff to obtain type and screen at the visit today.  She will continue with hydration 3 times a week.  She will continue with colestyramine.    Will reach out to her nutritionist to see if she is eligible to get the banana flakes with her tube feeds to help with the diarrhea.  We will see patient back in the office in 2 weeks with Dr. Medina.  Patient is aware to contact us for any additional questions/concerns or worsening symptoms.    Encounter provider JOANN Martin    The patient was identified by name and date of birth. Maira Moura was informed that this is a telemedicine visit and that the visit is being conducted through the Epic  Embedded platform. She agrees to proceed..  My office door was closed. No one else was in the room.  She acknowledged consent and understanding of privacy and security of the video platform. The patient has agreed to participate and understands they can discontinue the visit at any time.    Patient is aware this is a billable service.     Oncology History:  2/2019 - pT2N0 breast cancer treated with anastrozole, oncotype 13, ER+ MO+ Her2 neg     7/2023 - metastatic ascending colon adenocarcinoma to liver     Caris - KRAS G12D, CAIN, PD-L1 0%     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, unresectable     7/31/2023 - FOLFOX     11/20/2023 - opdivo added to FOLFOX     12/18/2023-cycle 11-20% dose reduction of 5-FU bolus and oxaliplatin due to increased fatigue     Jan 2024 - oxaliplatin removed from treatment plan due to neuropathy - PET scan shows good disease control in all areas except colon lesion     3/2024 - right hemicolectomy - pT3N0     6/3/24 - cryoablation to liver (no interruption to systemic therapy)     7/30/2024 - stop folfox     9/2024 - 10/2024 - cis/RT to tonsils and cervical Lns     12/2024 - disease progression of colon cancer in the liver    History of Present Illness   Maira Moura is a 69-year-old female with progression of her metastatic adenocarcinoma of her colon, squamous cell carcinoma of right tonsil and a remote history of stage Ia right-sided breast cancer.  She is currently on anastrozole for her breast cancer.    She is at the [facility] center right now about to start treatment.  Patient was started on FOLFIRI with of 50% dose reduction of irinotecan due to existing diarrhea.  Patient reports she is very fatigued where she slept all day on Saturday.  No fevers or infections.  Denies any mouth sores.  Patient continues to have diarrhea and she is taking cholestyramine 3 times a day.  She reports her neuropathy is stable however, at times it has increased.  Denies any  dizziness or lightheadedness.  No CP, SOB, palpitations.      She is having trouble with oral intake, she is able to eat some soft foods however most of her nutrition is through her PEG tube.  She is giving herself to 2 feeds a day.  She follows with our oncology nutritionist as well.    Labs:  1/20/2025: WBC 2.22, ANC 1.56, hemoglobin 7.5, platelets 163,000, creatinine 0.94, EGFR 62, liver enzymes WNL, mag 1.7    HPI  ECOG = 2  Maira Moura 61-year-old female, with a complex oncologic history, including metastatic adenocarcinoma of the mid ascending colon, which was complicated by partial obstruction requiring right hemicolectomy on 3/15/2024, with biopsy-proven oligometastatic disease to the liver.  She is status post cryoablation on Evelin 3, 2024.  In addition, unresectable p16 positive squamous cell carcinoma of the right tonsil.  Stage IA pT2 N0 right-sided breast cancer ER positive breast cancer treated with anastrozole.    On recent scan, patient was noted with progression of her metastatic adenocarcinoma of the colon, and she was started on FOLFIRI with a 50% dose reduction of irinotecan due to existing diarrhea.  Patient is not able to take any more oxaliplatin due to previous exposure and neuropathy.    Patient is having trouble with oral intake and most of her intake is through tube feeds.  She reports she gives herself to tube feeds and is currently following with our oncology nutritionist.      Visit Time  Total Visit Duration: 20    I spent  20 minutes in chart review, counseling, coordination of care, and documentation.    This note has been generated by voice recognition software system.  Therefore, there may be spelling, grammar, and or syntax errors. Please contact if questions arise.

## 2025-01-24 ENCOUNTER — HOSPITAL ENCOUNTER (OUTPATIENT)
Dept: INFUSION CENTER | Facility: CLINIC | Age: 70
End: 2025-01-24
Payer: MEDICARE

## 2025-01-24 VITALS
TEMPERATURE: 97.7 F | DIASTOLIC BLOOD PRESSURE: 80 MMHG | HEART RATE: 60 BPM | SYSTOLIC BLOOD PRESSURE: 129 MMHG | RESPIRATION RATE: 16 BRPM

## 2025-01-24 DIAGNOSIS — C78.7 METASTATIC COLON CANCER TO LIVER (HCC): Primary | ICD-10-CM

## 2025-01-24 DIAGNOSIS — Z17.0 MALIGNANT NEOPLASM OF UPPER-OUTER QUADRANT OF RIGHT BREAST IN FEMALE, ESTROGEN RECEPTOR POSITIVE (HCC): ICD-10-CM

## 2025-01-24 DIAGNOSIS — D64.81 ANTINEOPLASTIC CHEMOTHERAPY INDUCED ANEMIA: ICD-10-CM

## 2025-01-24 DIAGNOSIS — C50.411 MALIGNANT NEOPLASM OF UPPER-OUTER QUADRANT OF RIGHT BREAST IN FEMALE, ESTROGEN RECEPTOR POSITIVE (HCC): ICD-10-CM

## 2025-01-24 DIAGNOSIS — T45.1X5A ANTINEOPLASTIC CHEMOTHERAPY INDUCED ANEMIA: ICD-10-CM

## 2025-01-24 DIAGNOSIS — C18.9 METASTATIC COLON CANCER TO LIVER (HCC): Primary | ICD-10-CM

## 2025-01-24 LAB
ALBUMIN SERPL BCG-MCNC: 3.2 G/DL (ref 3.5–5)
ALP SERPL-CCNC: 78 U/L (ref 34–104)
ALT SERPL W P-5'-P-CCNC: 11 U/L (ref 7–52)
ANION GAP SERPL CALCULATED.3IONS-SCNC: 6 MMOL/L (ref 4–13)
ANISOCYTOSIS BLD QL SMEAR: PRESENT
AST SERPL W P-5'-P-CCNC: 22 U/L (ref 13–39)
BASOPHILS # BLD MANUAL: 0.01 THOUSAND/UL (ref 0–0.1)
BASOPHILS NFR MAR MANUAL: 2 % (ref 0–1)
BILIRUB SERPL-MCNC: 0.37 MG/DL (ref 0.2–1)
BUN SERPL-MCNC: 18 MG/DL (ref 5–25)
CALCIUM ALBUM COR SERPL-MCNC: 9.6 MG/DL (ref 8.3–10.1)
CALCIUM SERPL-MCNC: 9 MG/DL (ref 8.4–10.2)
CHLORIDE SERPL-SCNC: 105 MMOL/L (ref 96–108)
CO2 SERPL-SCNC: 27 MMOL/L (ref 21–32)
CREAT SERPL-MCNC: 0.82 MG/DL (ref 0.6–1.3)
EOSINOPHIL # BLD MANUAL: 0.02 THOUSAND/UL (ref 0–0.4)
EOSINOPHIL NFR BLD MANUAL: 3 % (ref 0–6)
ERYTHROCYTE [DISTWIDTH] IN BLOOD BY AUTOMATED COUNT: 14.6 % (ref 11.6–15.1)
GFR SERPL CREATININE-BSD FRML MDRD: 73 ML/MIN/1.73SQ M
GLUCOSE SERPL-MCNC: 97 MG/DL (ref 65–140)
HCT VFR BLD AUTO: 21.6 % (ref 34.8–46.1)
HELMET CELLS BLD QL SMEAR: PRESENT
HGB BLD-MCNC: 7.2 G/DL (ref 11.5–15.4)
LYMPHOCYTES # BLD AUTO: 0.25 THOUSAND/UL (ref 0.6–4.47)
LYMPHOCYTES # BLD AUTO: 35 % (ref 14–44)
MAGNESIUM SERPL-MCNC: 1.8 MG/DL (ref 1.9–2.7)
MCH RBC QN AUTO: 32.1 PG (ref 26.8–34.3)
MCHC RBC AUTO-ENTMCNC: 33.3 G/DL (ref 31.4–37.4)
MCV RBC AUTO: 96 FL (ref 82–98)
MONOCYTES # BLD AUTO: 0.02 THOUSAND/UL (ref 0–1.22)
MONOCYTES NFR BLD: 3 % (ref 4–12)
NEUTROPHILS # BLD MANUAL: 0.41 THOUSAND/UL (ref 1.85–7.62)
NEUTS SEG NFR BLD AUTO: 57 % (ref 43–75)
PLATELET # BLD AUTO: 242 THOUSANDS/UL (ref 149–390)
PLATELET BLD QL SMEAR: ADEQUATE
PLATELET CLUMP BLD QL SMEAR: PRESENT
PMV BLD AUTO: 10.1 FL (ref 8.9–12.7)
POIKILOCYTOSIS BLD QL SMEAR: PRESENT
POTASSIUM SERPL-SCNC: 3.3 MMOL/L (ref 3.5–5.3)
PROT SERPL-MCNC: 6.8 G/DL (ref 6.4–8.4)
RBC # BLD AUTO: 2.24 MILLION/UL (ref 3.81–5.12)
RBC MORPH BLD: PRESENT
SODIUM SERPL-SCNC: 138 MMOL/L (ref 135–147)
WBC # BLD AUTO: 0.72 THOUSAND/UL (ref 4.31–10.16)

## 2025-01-24 PROCEDURE — 85027 COMPLETE CBC AUTOMATED: CPT | Performed by: INTERNAL MEDICINE

## 2025-01-24 PROCEDURE — 85007 BL SMEAR W/DIFF WBC COUNT: CPT | Performed by: INTERNAL MEDICINE

## 2025-01-24 PROCEDURE — 86920 COMPATIBILITY TEST SPIN: CPT

## 2025-01-24 PROCEDURE — 80053 COMPREHEN METABOLIC PANEL: CPT | Performed by: INTERNAL MEDICINE

## 2025-01-24 PROCEDURE — 83735 ASSAY OF MAGNESIUM: CPT | Performed by: INTERNAL MEDICINE

## 2025-01-24 PROCEDURE — 96360 HYDRATION IV INFUSION INIT: CPT

## 2025-01-24 PROCEDURE — P9040 RBC LEUKOREDUCED IRRADIATED: HCPCS

## 2025-01-24 PROCEDURE — 36430 TRANSFUSION BLD/BLD COMPNT: CPT

## 2025-01-24 RX ORDER — SODIUM CHLORIDE 9 MG/ML
20 INJECTION, SOLUTION INTRAVENOUS ONCE
Status: CANCELLED | OUTPATIENT
Start: 2025-01-24

## 2025-01-24 RX ORDER — SODIUM CHLORIDE 9 MG/ML
20 INJECTION, SOLUTION INTRAVENOUS ONCE
Status: COMPLETED | OUTPATIENT
Start: 2025-01-24 | End: 2025-01-24

## 2025-01-24 RX ADMIN — SODIUM CHLORIDE 20 ML/HR: 0.9 INJECTION, SOLUTION INTRAVENOUS at 13:50

## 2025-01-24 RX ADMIN — SODIUM CHLORIDE 1000 ML: 0.9 INJECTION, SOLUTION INTRAVENOUS at 12:46

## 2025-01-24 NOTE — PROGRESS NOTES
Patient arrives for 5FU yellow BRANDIE disconnect, offers no new complaints, BRANDIE appears fully delfated, disconnected at 12:43, central labs drawn per orders (cbc,cmp,mg), 1 liter NSS given over 1 hour (rich coffey, PEPE aware labs not drawn wed and ok to just give saline today) and then 1 unit PRBC per orders, next appts confirmed Monday 1-27-25 12:30, AVS declined.

## 2025-01-25 LAB
ABO GROUP BLD BPU: NORMAL
BPU ID: NORMAL
CROSSMATCH: NORMAL
UNIT DISPENSE STATUS: NORMAL
UNIT PRODUCT CODE: NORMAL
UNIT PRODUCT VOLUME: 350 ML
UNIT RH: NORMAL

## 2025-01-27 ENCOUNTER — HOSPITAL ENCOUNTER (OUTPATIENT)
Dept: INFUSION CENTER | Facility: CLINIC | Age: 70
Discharge: HOME | End: 2025-01-27
Payer: MEDICARE

## 2025-01-27 VITALS
TEMPERATURE: 98 F | DIASTOLIC BLOOD PRESSURE: 82 MMHG | SYSTOLIC BLOOD PRESSURE: 134 MMHG | RESPIRATION RATE: 16 BRPM | HEART RATE: 102 BPM

## 2025-01-27 DIAGNOSIS — C78.7 METASTATIC COLON CANCER TO LIVER (HCC): ICD-10-CM

## 2025-01-27 DIAGNOSIS — G62.9 NEUROPATHY: ICD-10-CM

## 2025-01-27 DIAGNOSIS — Z17.0 MALIGNANT NEOPLASM OF UPPER-OUTER QUADRANT OF RIGHT BREAST IN FEMALE, ESTROGEN RECEPTOR POSITIVE (HCC): Primary | ICD-10-CM

## 2025-01-27 DIAGNOSIS — C50.411 MALIGNANT NEOPLASM OF UPPER-OUTER QUADRANT OF RIGHT BREAST IN FEMALE, ESTROGEN RECEPTOR POSITIVE (HCC): Primary | ICD-10-CM

## 2025-01-27 DIAGNOSIS — C18.9 METASTATIC COLON CANCER TO LIVER (HCC): ICD-10-CM

## 2025-01-27 LAB
ALBUMIN SERPL BCG-MCNC: 3.4 G/DL (ref 3.5–5)
ALP SERPL-CCNC: 76 U/L (ref 34–104)
ALT SERPL W P-5'-P-CCNC: 9 U/L (ref 7–52)
ANION GAP SERPL CALCULATED.3IONS-SCNC: 7 MMOL/L (ref 4–13)
ANISOCYTOSIS BLD QL SMEAR: PRESENT
AST SERPL W P-5'-P-CCNC: 20 U/L (ref 13–39)
BASOPHILS # BLD MANUAL: 0.01 THOUSAND/UL (ref 0–0.1)
BASOPHILS NFR MAR MANUAL: 1 % (ref 0–1)
BILIRUB SERPL-MCNC: 0.47 MG/DL (ref 0.2–1)
BUN SERPL-MCNC: 14 MG/DL (ref 5–25)
CALCIUM ALBUM COR SERPL-MCNC: 9.7 MG/DL (ref 8.3–10.1)
CALCIUM SERPL-MCNC: 9.2 MG/DL (ref 8.4–10.2)
CHLORIDE SERPL-SCNC: 102 MMOL/L (ref 96–108)
CO2 SERPL-SCNC: 27 MMOL/L (ref 21–32)
CREAT SERPL-MCNC: 0.91 MG/DL (ref 0.6–1.3)
EOSINOPHIL # BLD MANUAL: 0.03 THOUSAND/UL (ref 0–0.4)
EOSINOPHIL NFR BLD MANUAL: 4 % (ref 0–6)
ERYTHROCYTE [DISTWIDTH] IN BLOOD BY AUTOMATED COUNT: 16.1 % (ref 11.6–15.1)
GFR SERPL CREATININE-BSD FRML MDRD: 65 ML/MIN/1.73SQ M
GIANT PLATELETS BLD QL SMEAR: PRESENT
GLUCOSE SERPL-MCNC: 112 MG/DL (ref 65–140)
HCT VFR BLD AUTO: 26.4 % (ref 34.8–46.1)
HGB BLD-MCNC: 8.7 G/DL (ref 11.5–15.4)
LYMPHOCYTES # BLD AUTO: 0.27 THOUSAND/UL (ref 0.6–4.47)
LYMPHOCYTES # BLD AUTO: 40 % (ref 14–44)
MAGNESIUM SERPL-MCNC: 1.7 MG/DL (ref 1.9–2.7)
MCH RBC QN AUTO: 30.2 PG (ref 26.8–34.3)
MCHC RBC AUTO-ENTMCNC: 33 G/DL (ref 31.4–37.4)
MCV RBC AUTO: 92 FL (ref 82–98)
MONOCYTES # BLD AUTO: 0.02 THOUSAND/UL (ref 0–1.22)
MONOCYTES NFR BLD: 3 % (ref 4–12)
NEUTROPHILS # BLD MANUAL: 0.35 THOUSAND/UL (ref 1.85–7.62)
NEUTS SEG NFR BLD AUTO: 52 % (ref 43–75)
PLATELET # BLD AUTO: 220 THOUSANDS/UL (ref 149–390)
PLATELET BLD QL SMEAR: ADEQUATE
PMV BLD AUTO: 10 FL (ref 8.9–12.7)
POTASSIUM SERPL-SCNC: 3.3 MMOL/L (ref 3.5–5.3)
PROT SERPL-MCNC: 7.1 G/DL (ref 6.4–8.4)
RBC # BLD AUTO: 2.88 MILLION/UL (ref 3.81–5.12)
RBC MORPH BLD: PRESENT
SODIUM SERPL-SCNC: 136 MMOL/L (ref 135–147)
WBC # BLD AUTO: 0.68 THOUSAND/UL (ref 4.31–10.16)

## 2025-01-27 PROCEDURE — 96365 THER/PROPH/DIAG IV INF INIT: CPT

## 2025-01-27 PROCEDURE — 80053 COMPREHEN METABOLIC PANEL: CPT | Performed by: INTERNAL MEDICINE

## 2025-01-27 PROCEDURE — 85027 COMPLETE CBC AUTOMATED: CPT | Performed by: INTERNAL MEDICINE

## 2025-01-27 PROCEDURE — 85007 BL SMEAR W/DIFF WBC COUNT: CPT | Performed by: INTERNAL MEDICINE

## 2025-01-27 PROCEDURE — 83735 ASSAY OF MAGNESIUM: CPT | Performed by: INTERNAL MEDICINE

## 2025-01-27 RX ORDER — LANOLIN ALCOHOL/MO/W.PET/CERES
50 CREAM (GRAM) TOPICAL DAILY
Qty: 90 TABLET | Refills: 3 | Status: SHIPPED | OUTPATIENT
Start: 2025-01-27

## 2025-01-27 RX ORDER — MIRTAZAPINE 15 MG/1
15 TABLET, FILM COATED ORAL
Qty: 30 TABLET | Refills: 0 | Status: SHIPPED | OUTPATIENT
Start: 2025-01-27

## 2025-01-27 RX ORDER — MIRTAZAPINE 30 MG/1
30 TABLET, FILM COATED ORAL
Qty: 30 TABLET | Refills: 0 | Status: SHIPPED | OUTPATIENT
Start: 2025-01-27

## 2025-01-27 RX ADMIN — MAGNESIUM SULFATE HEPTAHYDRATE: 500 INJECTION, SOLUTION INTRAMUSCULAR; INTRAVENOUS at 12:55

## 2025-01-27 NOTE — PROGRESS NOTES
Maira Moura  tolerated Labs + Hydration + mag well with no complications.      Maira Moura is aware of future appt on Wednesday Jan 29, 2025 1:00 PM.     AVS declined by Maira Moura.

## 2025-01-27 NOTE — PLAN OF CARE
Problem: Potential for Falls  Goal: Patient will remain free of falls  Description: INTERVENTIONS:  - Educate patient/family on patient safety including physical limitations  - Instruct patient to call for assistance with activity   - Keep Call bell within reach  - Keep bed low and locked with side rails adjusted as appropriate  - Keep care items and personal belongings within reach  - Initiate and maintain comfort rounds  - Consider moving patient to room near nurses station  Outcome: Progressing

## 2025-01-28 ENCOUNTER — TELEPHONE (OUTPATIENT)
Age: 70
End: 2025-01-28

## 2025-01-28 ENCOUNTER — APPOINTMENT (OUTPATIENT)
Dept: SPEECH THERAPY | Facility: CLINIC | Age: 70
End: 2025-01-28
Payer: MEDICARE

## 2025-01-28 NOTE — TELEPHONE ENCOUNTER
Called patient to check in and see how she is feeling.   Looks like she canceled her speech therapy today.   I had to leave a voicemail for her to call back with an update.

## 2025-01-29 ENCOUNTER — HOSPITAL ENCOUNTER (OUTPATIENT)
Dept: INFUSION CENTER | Facility: CLINIC | Age: 70
Discharge: HOME | End: 2025-01-29
Payer: MEDICARE

## 2025-01-29 ENCOUNTER — TELEPHONE (OUTPATIENT)
Dept: NUTRITION | Facility: HOSPITAL | Age: 70
End: 2025-01-29

## 2025-01-29 VITALS
SYSTOLIC BLOOD PRESSURE: 117 MMHG | TEMPERATURE: 97.2 F | HEART RATE: 96 BPM | DIASTOLIC BLOOD PRESSURE: 69 MMHG | RESPIRATION RATE: 16 BRPM

## 2025-01-29 DIAGNOSIS — C50.411 MALIGNANT NEOPLASM OF UPPER-OUTER QUADRANT OF RIGHT BREAST IN FEMALE, ESTROGEN RECEPTOR POSITIVE (HCC): Primary | ICD-10-CM

## 2025-01-29 DIAGNOSIS — Z17.0 MALIGNANT NEOPLASM OF UPPER-OUTER QUADRANT OF RIGHT BREAST IN FEMALE, ESTROGEN RECEPTOR POSITIVE (HCC): Primary | ICD-10-CM

## 2025-01-29 LAB
ALBUMIN SERPL BCG-MCNC: 3.4 G/DL (ref 3.5–5)
ALP SERPL-CCNC: 88 U/L (ref 34–104)
ALT SERPL W P-5'-P-CCNC: 9 U/L (ref 7–52)
ANION GAP SERPL CALCULATED.3IONS-SCNC: 7 MMOL/L (ref 4–13)
AST SERPL W P-5'-P-CCNC: 19 U/L (ref 13–39)
BASOPHILS # BLD AUTO: 0.01 THOUSANDS/ΜL (ref 0–0.1)
BASOPHILS NFR BLD AUTO: 1 % (ref 0–1)
BILIRUB SERPL-MCNC: 0.36 MG/DL (ref 0.2–1)
BUN SERPL-MCNC: 16 MG/DL (ref 5–25)
CALCIUM ALBUM COR SERPL-MCNC: 9.6 MG/DL (ref 8.3–10.1)
CALCIUM SERPL-MCNC: 9.1 MG/DL (ref 8.4–10.2)
CHLORIDE SERPL-SCNC: 104 MMOL/L (ref 96–108)
CO2 SERPL-SCNC: 26 MMOL/L (ref 21–32)
CREAT SERPL-MCNC: 1.23 MG/DL (ref 0.6–1.3)
EOSINOPHIL # BLD AUTO: 0.03 THOUSAND/ΜL (ref 0–0.61)
EOSINOPHIL NFR BLD AUTO: 2 % (ref 0–6)
ERYTHROCYTE [DISTWIDTH] IN BLOOD BY AUTOMATED COUNT: 15.9 % (ref 11.6–15.1)
GFR SERPL CREATININE-BSD FRML MDRD: 45 ML/MIN/1.73SQ M
GLUCOSE SERPL-MCNC: 107 MG/DL (ref 65–140)
HCT VFR BLD AUTO: 26.5 % (ref 34.8–46.1)
HGB BLD-MCNC: 8.7 G/DL (ref 11.5–15.4)
IMM GRANULOCYTES # BLD AUTO: 0.12 THOUSAND/UL (ref 0–0.2)
IMM GRANULOCYTES NFR BLD AUTO: 8 % (ref 0–2)
LYMPHOCYTES # BLD AUTO: 0.45 THOUSANDS/ΜL (ref 0.6–4.47)
LYMPHOCYTES NFR BLD AUTO: 30 % (ref 14–44)
MAGNESIUM SERPL-MCNC: 2 MG/DL (ref 1.9–2.7)
MCH RBC QN AUTO: 29.9 PG (ref 26.8–34.3)
MCHC RBC AUTO-ENTMCNC: 32.8 G/DL (ref 31.4–37.4)
MCV RBC AUTO: 91 FL (ref 82–98)
MONOCYTES # BLD AUTO: 0.09 THOUSAND/ΜL (ref 0.17–1.22)
MONOCYTES NFR BLD AUTO: 6 % (ref 4–12)
NEUTROPHILS # BLD AUTO: 0.82 THOUSANDS/ΜL (ref 1.85–7.62)
NEUTS SEG NFR BLD AUTO: 53 % (ref 43–75)
NRBC BLD AUTO-RTO: 0 /100 WBCS
PLATELET # BLD AUTO: 182 THOUSANDS/UL (ref 149–390)
PMV BLD AUTO: 9.8 FL (ref 8.9–12.7)
POTASSIUM SERPL-SCNC: 3.4 MMOL/L (ref 3.5–5.3)
PROT SERPL-MCNC: 7.3 G/DL (ref 6.4–8.4)
RBC # BLD AUTO: 2.91 MILLION/UL (ref 3.81–5.12)
SODIUM SERPL-SCNC: 137 MMOL/L (ref 135–147)
WBC # BLD AUTO: 1.52 THOUSAND/UL (ref 4.31–10.16)

## 2025-01-29 PROCEDURE — 80053 COMPREHEN METABOLIC PANEL: CPT | Performed by: INTERNAL MEDICINE

## 2025-01-29 PROCEDURE — 96365 THER/PROPH/DIAG IV INF INIT: CPT

## 2025-01-29 PROCEDURE — 85025 COMPLETE CBC W/AUTO DIFF WBC: CPT | Performed by: INTERNAL MEDICINE

## 2025-01-29 PROCEDURE — 83735 ASSAY OF MAGNESIUM: CPT | Performed by: INTERNAL MEDICINE

## 2025-01-29 RX ADMIN — MAGNESIUM SULFATE HEPTAHYDRATE: 500 INJECTION, SOLUTION INTRAMUSCULAR; INTRAVENOUS at 13:17

## 2025-01-29 NOTE — PROGRESS NOTES
Patient arrived to unit without complaint. Patient tolerated IV hydration and Magnesium infusion without incident. Central labs collected. AVS declined and patient aware of next appointment on 1/31/25 at 13:00. Patient left in stable condition.

## 2025-01-29 NOTE — TELEPHONE ENCOUNTER
Call received by Maira.       Patient called requesting to speak to Mia or Hanane. Patient did not want to speak to CTS. Advised patient I will send message to  team and they will return her call.      Please call patient.       Thank you!

## 2025-01-29 NOTE — TELEPHONE ENCOUNTER
Returned call to patient.  She explained she had to cancel her speak therapy appointment yesterday due to having a sore on the roof of her mouth.  She states she does not need anything from our office at this time.  She will be attending/participating in her next speech therapy session  Confirmed with her that prescriptions were renewed on 1/27/25  Encouraged her to call back with any additional questions or concerns

## 2025-01-29 NOTE — TELEPHONE ENCOUNTER
RD called patient to update on tube feeding delivery. RD ordered tube feeding formula and supplies on 1/21 via parachute with [facility]. The shipment has kept getting delays but hopefully should be delivered very soon. She did not answer but I left a voicemail stating above and asking to call back to see if there is anything I can do in the meantime.

## 2025-01-30 ENCOUNTER — APPOINTMENT (OUTPATIENT)
Dept: SPEECH THERAPY | Facility: CLINIC | Age: 70
End: 2025-01-30
Payer: MEDICARE

## 2025-01-30 ENCOUNTER — TELEPHONE (OUTPATIENT)
Age: 70
End: 2025-01-30

## 2025-01-30 DIAGNOSIS — R13.12 DYSPHAGIA, OROPHARYNGEAL PHASE: Primary | ICD-10-CM

## 2025-01-30 DIAGNOSIS — C09.9 RIGHT TONSILLAR SQUAMOUS CELL CARCINOMA (HCC): ICD-10-CM

## 2025-01-30 DIAGNOSIS — Z90.89 STATUS POST TONSILLECTOMY: ICD-10-CM

## 2025-01-30 NOTE — PROGRESS NOTES
Cardio-Oncology Clinic Note    Maira Moura 69 y.o. 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  Right sided.  S/p XRT  Colon cancer  Dx 2023  Metastatic to liver  S/p surgery 3/2024  S/p cryoablation of right hepatic metastatic lesion 6-2024  Therapy:  Prior:  Opdivo (nivolumab)  Current:  anastrazole, 5-FU, irinotecan    # Diastolic dysfunction on echo  Not unexpected echo finding given age, HTN, obesity  Reports mild MOSHER  Exam -  remains euvolemic  BNP - normal  No medical therapy for this unless clinical CHF / volume overload    # lightheadedness  positional.   Improved with better hydration - stay hydrated    # HTN  Losartan  50mg  daily  Amlodipine started in November during hospitalization but patient self discontinued.  Without this medication blood pressure is at goal.  No changes.    # HLD  Of note, does have coronary calcifications on CT  On Lipitor 40mg qhs  repeated lipids on statin  - LDL improved to 97 (from 150)     # Hx TIA  2017.    # High risk medication use   5-FU - risk of coronary vasospasm.  Continue to monitor   Irinotecan - hypotension, arrhythmia, thromboembolic process      Today's Plan Summary:  See above assessment/plan for full details of today's plan.  Briefly,     Clinically stable from the cardiac perspective, no medication changes.  We did discuss the potential cardiac complications of her current chemotherapy regimen.              Reason For Visit / Chief Complaint:  F/u diastolic dysfunction, HTN, HLD    HPI:   Maira Moura is a 69 y.o.  female with history as noted in the problem list and further detailed in the above assessment and plan.    Initial:   Dec 2023  Referred by Dr. Medina for positional lightheadedness and diastolic dysfunction on echo.  As above, the patient has a history of  "breast cancer and more recently colon cancer and squamous cell carcinoma of the tonsil.  The patient has been started on immunotherapy and FOLFOX.  At the recent oncology visit she reported some positional lightheadedness.  An echo was obtained demonstrating diastolic dysfunction.   Today, the patient reports - fatigue is main complaint.   Also has lightheadedness.   Episodes last a few minutes.  Can be when just laying there.  Can also be positional.   Describes sx's as a dizziness.  Sx's started before chemotherapy but worse after starting.   No syncope.   No CP.   Some SOB.  Some MOSHER.  No palpitations.    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   Single.  5 kids.    Interval:  Last visit -->  Continues on Opdivo (nivolumab) and 5-FU.  Dizziness doing well -only occasional.    Plan last visit -->  No medication changes.     Norvasc was added in November for hypertension.  She tells me she stopped taking it on her own. \"I didn't need it\"    Oncology notes reviewed.  There has been some progression of the colon cancer in the liver.  Chemotherapy regimen was switched to 5-FU and irinotecan.    Today - fatigue.   No chest pain.  No SOB.   No leg edema.  No syncope. No palpitations.           Cardiac Imaging personally reviewed:  EKG 6/2023  NSR    12-12-23  Sinus tachycardia at 104 bpm.  Otherwise, normal EKG.       Holter or event monitor    Echo 10/2023  Mild LVH.  EF 65% (on my reivew).  Grade 1 diastolic dysfunction.  Normal RV size and function.  No significant valve disease         JAZMYN    Cardiac MRI    Stress testing    Coronary CTA or [facility]    RHC    CPET              Patient Active Problem List    Diagnosis Date Noted   • Antineoplastic chemotherapy induced anemia 01/22/2025   • Functional diarrhea 12/31/2024   • Hypomagnesemia 11/27/2024   • Cancer related pain 11/19/2024   • Pancytopenia due to chemotherapy (HCC) 10/30/2024   • Neoplastic malignant related fatigue 10/29/2024   • Sensation, choking " 10/29/2024   • S/P percutaneous endoscopic gastrostomy (PEG) tube placement (HCC) 10/29/2024   • Vitamin B12 deficiency 05/08/2024   • Elevated LFTs 03/25/2024   • Chronic nasal congestion 03/18/2024   • Hypokalemia 03/15/2024   • Leukemoid reaction 03/15/2024   • GOGO (acute kidney injury) (HCC) 03/15/2024   • Acute post-operative pain 03/15/2024   • At risk for constipation 03/15/2024   • At risk for delirium 03/15/2024   • Palliative care encounter 03/15/2024   • Physical deconditioning 03/15/2024   • History of CVA (cerebrovascular accident) 03/15/2024   • Diastolic dysfunction 01/12/2024   • Neuropathy 01/11/2024   • Thrombocytopenia (HCC) 12/29/2023   • Stage 3a chronic kidney disease (HCC) 12/12/2023   • Chemotherapy induced neutropenia (HCC) 11/09/2023   • Dehydration 10/06/2023   • Drug-induced constipation 10/06/2023   • Nutritional anemia 08/24/2023   • Poor appetite 08/03/2023   • Right tonsillar squamous cell carcinoma (HCC) 07/27/2023   • Metastatic colon cancer to liver (HCC) 07/11/2023   • GERD (gastroesophageal reflux disease)    • Obesity    • Right thyroid nodule 04/04/2023   • Prediabetes 07/07/2022   • Vitamin D deficiency 01/25/2019   • Malignant neoplasm of right female breast (HCC) 12/20/2018   • Mixed hyperlipidemia 08/09/2018   • Primary hypertension 07/27/2017       Past Medical History:   Diagnosis Date   • Anemia    • Arthritis    • Body mass index (BMI) 40.0-44.9, adult (HCC) 9/22/2023   • Breast cancer (HCC) 12/17/2018   • Cancer (HCC)     right breast, colon, liver, right tonsil   • Cervical lymphadenopathy 3/21/2023    CT neck on 3/30/2023- Large right level 2A lymphadenopathy as described above and suspicious for neoplasm.  Correlation with histopathology is recommended.  Mild asymmetric prominence of the right palatine tonsil with otherwise no definitive nodular enhancing lesions identified along the course of the aerodigestive tract.     5/26/23- FNA of this node was nonrevealing  for tissue etiology, but it d   • Encounter for screening for HIV 07/07/2022   • Follow-up examination 04/04/2023   • GERD (gastroesophageal reflux disease)    • Hyperlipidemia    • Hypertension    • Obesity    • Stroke (HCC)     TIA    • Stroke (HCC)     TIA    • Transaminitis 09/22/2021   • Vasomotor rhinitis 8/9/2018    Refilled flonase today. Stopped taking since January 2022       No Known Allergies    Current Outpatient Medications   Medication Instructions   • acetaminophen (TYLENOL) 160 mg/5 mL liquid Take 20 mL (640 mg total) by mouth every 6 (six) hours as needed for mild pain for up to 10 days   • anastrozole (ARIMIDEX) 1 mg, Oral, Daily   • atorvastatin (LIPITOR) 40 mg, Oral, Daily at bedtime   • cholestyramine (QUESTRAN) 4 g, Oral, 3 times daily with meals   • diphenhydramine, lidocaine, Al/Mg hydroxide, simethicone (Magic Mouthwash) SUSP 10 mL, Swish & Swallow, Every 4 hours PRN   • docusate sodium (COLACE) 50 mg, Oral, 2 times daily   • ergocalciferol (VITAMIN D2) 50,000 Units, Oral, Weekly   • [START ON 2/5/2025] fluorouracil 4,610 mg in CADD/Elastomeric Infusion Device 1,200 mg/m2/day, Intravenous, Over 46 hours   • folic acid (FOLVITE) 1 mg, Oral, Daily   • gabapentin (NEURONTIN) 300 mg capsule Take 1 pills in the morning, 1 pill at lunch and 2 pills before bed   • hydrocortisone 1 % ointment    • ibuprofen (MOTRIN) 400 mg, Oral, Every 6 hours PRN   • lidocaine-prilocaine (EMLA) cream Topical, As needed   • losartan (COZAAR) 50 mg, Oral, Daily   • magnesium Oxide (MAG-OX) 400 mg, Per G Tube, 2 times daily   • mirtazapine (REMERON) 30 mg, Oral, Daily at bedtime   • mirtazapine (REMERON) 15 mg, Oral, Daily at bedtime, Patient is also on a 30 mg tablet, for a total dose of 45 mg   • nystatin (MYCOSTATIN) 500,000 Units, Mouth/Throat, 4 times daily   • omeprazole (PRILOSEC) 20 mg, Oral, Daily   • ondansetron (ZOFRAN) 8 mg, Oral, Every 8 hours PRN   • oxyCODONE (ROXICODONE) 5 mg, Oral, Every 6 hours PRN    • potassium chloride (Klor-Con M20) 20 mEq tablet 20 mEq, Oral, 2 times daily   • potassium chloride 10% oral solution 20 mEq, Per G Tube, 2 times daily   • prochlorperazine (COMPAZINE) 10 mg, Oral, Every 6 hours PRN   • pyridoxine (VITAMIN B6) 50 mg, Oral, Daily   • vitamin B-12 (VITAMIN B-12) 1,000 mcg tablet        Social History     Socioeconomic History   • Marital status: Single     Spouse name: Not on file   • Number of children: Not on file   • Years of education: Not on file   • Highest education level: Not on file   Occupational History   • Not on file   Tobacco Use   • Smoking status: Never     Passive exposure: Never   • Smokeless tobacco: Never   • Tobacco comments:     NO TOBACCO USE   Vaping Use   • Vaping status: Never Used   Substance and Sexual Activity   • Alcohol use: Never   • Drug use: Never   • Sexual activity: Not on file   Other Topics Concern   • Not on file   Social History Narrative   • Not on file     Social Drivers of Health     Financial Resource Strain: Low Risk  (10/9/2023)    Overall Financial Resource Strain (CARDIA)    • Difficulty of Paying Living Expenses: Not hard at all   Food Insecurity: No Food Insecurity (10/31/2024)    Hunger Vital Sign    • Worried About Running Out of Food in the Last Year: Never true    • Ran Out of Food in the Last Year: Never true   Transportation Needs: No Transportation Needs (10/31/2024)    PRAPARE - Transportation    • Lack of Transportation (Medical): No    • Lack of Transportation (Non-Medical): No   Recent Concern: Transportation Needs - Unmet Transportation Needs (10/29/2024)    Nursing - Transportation Risk Classification    • Lack of Transportation: Not on file    • Lack of Transportation: Yes   Physical Activity: Not on file   Stress: Not on file   Social Connections: Not on file   Intimate Partner Violence: Unknown (10/29/2024)    Nursing IPS    • Feels Physically and Emotionally Safe: Not on file    • Physically Hurt by Someone: Not on  "file    • Humiliated or Emotionally Abused by Someone: Not on file    • Physically Hurt by Someone: No    • Hurt or Threatened by Someone: No   Housing Stability: Low Risk  (10/31/2024)    Housing Stability Vital Sign    • Unable to Pay for Housing in the Last Year: No    • Number of Times Moved in the Last Year: 0    • Homeless in the Last Year: No       Family History   Problem Relation Age of Onset   • Colon cancer Mother 58   • Hypertension Mother    • Pancreatic cancer Father 60   • Hypertension Daughter    • Hypertension Son        Physical Exam:  Blood pressure 112/78, pulse 92, height 5' 5.98\" (1.676 m), weight 75.1 kg (165 lb 9.6 oz), SpO2 98%, not currently breastfeeding.  Body mass index is 26.74 kg/m².  Wt Readings from Last 3 Encounters:   01/31/25 75.1 kg (165 lb 9.6 oz)   01/30/25 75.6 kg (166 lb 10.7 oz)   01/22/25 75.6 kg (166 lb 10.7 oz)     Physical Exam  Vitals reviewed.   Constitutional:       General: She is not in acute distress.     Appearance: She is not toxic-appearing.   Neck:      Comments: JVP is not elevated  Cardiovascular:      Rate and Rhythm: Normal rate and regular rhythm.      Heart sounds: No murmur heard.     No friction rub. No gallop.   Pulmonary:      Breath sounds: Normal breath sounds. No wheezing, rhonchi or rales.   Musculoskeletal:      Comments: Trace LE edema   Neurological:      Mental Status: She is alert.         Labs & Results:  Lab Results   Component Value Date    SODIUM 137 01/29/2025    K 3.4 (L) 01/29/2025     01/29/2025    CO2 26 01/29/2025    BUN 16 01/29/2025    CREATININE 1.23 01/29/2025    GLUC 107 01/29/2025    CALCIUM 9.1 01/29/2025     No results found for: \"NTBNP\"       Thank you for the opportunity to participate in the care of this patient.    Matthew Whitfield MD, [facility]  Staff Cardiologist  Director of Cardio-Oncology  [facility]  "

## 2025-01-30 NOTE — TELEPHONE ENCOUNTER
Pt's appt needs to be rescheduled d/t provider not being in the office. Attempted to reach patient about need of appointment change with no success. Detailed VM left with [facility] number [phone] for patient to return call to reschedule.     Additional message sent Via [employer].

## 2025-01-31 ENCOUNTER — OFFICE VISIT (OUTPATIENT)
Dept: CARDIOLOGY CLINIC | Facility: CLINIC | Age: 70
End: 2025-01-31
Payer: MEDICARE

## 2025-01-31 ENCOUNTER — HOSPITAL ENCOUNTER (OUTPATIENT)
Dept: INFUSION CENTER | Facility: CLINIC | Age: 70
End: 2025-01-31
Payer: MEDICARE

## 2025-01-31 VITALS
DIASTOLIC BLOOD PRESSURE: 78 MMHG | BODY MASS INDEX: 26.61 KG/M2 | WEIGHT: 165.6 LBS | SYSTOLIC BLOOD PRESSURE: 112 MMHG | HEIGHT: 66 IN | OXYGEN SATURATION: 98 % | HEART RATE: 92 BPM

## 2025-01-31 VITALS
TEMPERATURE: 97.3 F | HEART RATE: 91 BPM | DIASTOLIC BLOOD PRESSURE: 78 MMHG | RESPIRATION RATE: 18 BRPM | SYSTOLIC BLOOD PRESSURE: 123 MMHG

## 2025-01-31 DIAGNOSIS — C78.7 METASTATIC COLON CANCER TO LIVER (HCC): Primary | ICD-10-CM

## 2025-01-31 DIAGNOSIS — E78.2 MIXED HYPERLIPIDEMIA: ICD-10-CM

## 2025-01-31 DIAGNOSIS — Z92.21 STATUS POST ADMINISTRATION OF CARDIOTOXIC CHEMOTHERAPY: Primary | ICD-10-CM

## 2025-01-31 DIAGNOSIS — Z17.0 MALIGNANT NEOPLASM OF UPPER-OUTER QUADRANT OF RIGHT BREAST IN FEMALE, ESTROGEN RECEPTOR POSITIVE (HCC): Primary | ICD-10-CM

## 2025-01-31 DIAGNOSIS — C50.411 MALIGNANT NEOPLASM OF UPPER-OUTER QUADRANT OF RIGHT BREAST IN FEMALE, ESTROGEN RECEPTOR POSITIVE (HCC): Primary | ICD-10-CM

## 2025-01-31 DIAGNOSIS — C18.9 METASTATIC COLON CANCER TO LIVER (HCC): Primary | ICD-10-CM

## 2025-01-31 DIAGNOSIS — I51.89 DIASTOLIC DYSFUNCTION: ICD-10-CM

## 2025-01-31 DIAGNOSIS — I10 PRIMARY HYPERTENSION: ICD-10-CM

## 2025-01-31 LAB
ALBUMIN SERPL BCG-MCNC: 3.4 G/DL (ref 3.5–5)
ALP SERPL-CCNC: 82 U/L (ref 34–104)
ALT SERPL W P-5'-P-CCNC: 9 U/L (ref 7–52)
ANION GAP SERPL CALCULATED.3IONS-SCNC: 8 MMOL/L (ref 4–13)
AST SERPL W P-5'-P-CCNC: 21 U/L (ref 13–39)
BASOPHILS # BLD AUTO: 0.01 THOUSANDS/ΜL (ref 0–0.1)
BASOPHILS NFR BLD AUTO: 1 % (ref 0–1)
BILIRUB SERPL-MCNC: 0.4 MG/DL (ref 0.2–1)
BUN SERPL-MCNC: 14 MG/DL (ref 5–25)
CALCIUM ALBUM COR SERPL-MCNC: 9.5 MG/DL (ref 8.3–10.1)
CALCIUM SERPL-MCNC: 9 MG/DL (ref 8.4–10.2)
CHLORIDE SERPL-SCNC: 108 MMOL/L (ref 96–108)
CO2 SERPL-SCNC: 24 MMOL/L (ref 21–32)
CREAT SERPL-MCNC: 0.98 MG/DL (ref 0.6–1.3)
EOSINOPHIL # BLD AUTO: 0.03 THOUSAND/ΜL (ref 0–0.61)
EOSINOPHIL NFR BLD AUTO: 3 % (ref 0–6)
ERYTHROCYTE [DISTWIDTH] IN BLOOD BY AUTOMATED COUNT: 15.9 % (ref 11.6–15.1)
GFR SERPL CREATININE-BSD FRML MDRD: 59 ML/MIN/1.73SQ M
GLUCOSE SERPL-MCNC: 113 MG/DL (ref 65–140)
HCT VFR BLD AUTO: 25.1 % (ref 34.8–46.1)
HGB BLD-MCNC: 8.3 G/DL (ref 11.5–15.4)
IMM GRANULOCYTES # BLD AUTO: 0 THOUSAND/UL (ref 0–0.2)
IMM GRANULOCYTES NFR BLD AUTO: 0 % (ref 0–2)
LYMPHOCYTES # BLD AUTO: 0.32 THOUSANDS/ΜL (ref 0.6–4.47)
LYMPHOCYTES NFR BLD AUTO: 27 % (ref 14–44)
MAGNESIUM SERPL-MCNC: 2 MG/DL (ref 1.9–2.7)
MCH RBC QN AUTO: 30.6 PG (ref 26.8–34.3)
MCHC RBC AUTO-ENTMCNC: 33.1 G/DL (ref 31.4–37.4)
MCV RBC AUTO: 93 FL (ref 82–98)
MONOCYTES # BLD AUTO: 0.1 THOUSAND/ΜL (ref 0.17–1.22)
MONOCYTES NFR BLD AUTO: 9 % (ref 4–12)
NEUTROPHILS # BLD AUTO: 0.72 THOUSANDS/ΜL (ref 1.85–7.62)
NEUTS SEG NFR BLD AUTO: 60 % (ref 43–75)
NRBC BLD AUTO-RTO: 0 /100 WBCS
PLATELET # BLD AUTO: 117 THOUSANDS/UL (ref 149–390)
PMV BLD AUTO: 9.9 FL (ref 8.9–12.7)
POTASSIUM SERPL-SCNC: 3.8 MMOL/L (ref 3.5–5.3)
PROT SERPL-MCNC: 7.1 G/DL (ref 6.4–8.4)
RBC # BLD AUTO: 2.71 MILLION/UL (ref 3.81–5.12)
SODIUM SERPL-SCNC: 140 MMOL/L (ref 135–147)
WBC # BLD AUTO: 1.18 THOUSAND/UL (ref 4.31–10.16)

## 2025-01-31 PROCEDURE — 83735 ASSAY OF MAGNESIUM: CPT | Performed by: INTERNAL MEDICINE

## 2025-01-31 PROCEDURE — 85025 COMPLETE CBC W/AUTO DIFF WBC: CPT | Performed by: INTERNAL MEDICINE

## 2025-01-31 PROCEDURE — 80053 COMPREHEN METABOLIC PANEL: CPT | Performed by: INTERNAL MEDICINE

## 2025-01-31 PROCEDURE — 99214 OFFICE O/P EST MOD 30 MIN: CPT | Performed by: INTERNAL MEDICINE

## 2025-01-31 PROCEDURE — 96360 HYDRATION IV INFUSION INIT: CPT

## 2025-01-31 RX ADMIN — SODIUM CHLORIDE 1000 ML: 0.9 INJECTION, SOLUTION INTRAVENOUS at 13:06

## 2025-01-31 NOTE — PROGRESS NOTES
Patient arrived to unit without complaint. Central labs collected. Patient tolerated NSS bolus without incident. AVS provided and patient aware of next appointment on 2/3/25 at 13:00. Patient left in stable condition.

## 2025-02-02 NOTE — PROGRESS NOTES
"Daily Speech Treatment Note    Today's date: 2025  Patient’s name: Maira Moura  : 1955  MRN: [id number]  Safety measures: HTN, CVA, GERD, active CA, s/p PEG tube placement, aspiration precautions  Current recommended diet: PEG tube for nutrition/hydration/medications  Referring provider: Harika Medina DO    Encounter Diagnosis     ICD-10-CM    1. Dysphagia, oropharyngeal phase  R13.12       2. Status post tonsillectomy  Z90.89       3. 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         Visit Tracking:  POC   Expires Auth Expiration Date ST Visit Limit   2025 or 10th visit 2025 BOMN              Visit/Unit Tracking:  Auth Status Date 25   Not required Used 1 2 3 4 5 6     Remaining 9 8 7 6 5 4     Subjective/Behavioral:  -Patient reported that her swallowing has been \"horrible\" since she was last seen in OP ST services for dysphagia on 2025. Patient indicated that she was not feeling well enough to attend therapy since then and had to cancel her appointments. Patient noted that she has had limited P.O. intake (only taking sips of thin liquid water as tolerated). Patient noted that it takes 2-3 swallows to get anything down, and that she coughs it up. Patient has been in communication with her RD re: TF. Patient stated that her ENT prescribed her with a medication to treat thrush (Nystatin). Patient expressed that the sore in her mouth have since healed and today was her first day wearing her dentures again.     -Patient also reported that her doctor would like for her to be able to swallow a medication whole P.O. in four weeks from now. Patient stated she is crushing all medications at this time; however, this particular medication that her doctor is recommending, cannot be crushed per patient report. (*Based upon patient's swallowing assessment on this date of service, patient is not deemed to be safe with swallowing " "whole medications P.O. at this time. (?alternative form of medication, e.g., liquid via PEG tube))    Objective/Assessment:  -Reviewed patient's home exercises/activities completed since last appointment. Patient has not been practicing due to odynophagia.    -Patient had a f/u with ENT on 01/30/2025. Per chart review  \"...Patient with history of colon cancer in addition to right tonsil p16+ SCC T1N3M0 on initial staging. She was presented at Multidisciplinary Head and Neck Tumor Board with a consensus for re-evaluation by Otolaryngology for DL with biopsy of left tonsil and Radiation Oncology to consider XRT.     S/p DL biopsy and tonsillectomy October 2024.      Discussed that post operative discomfort can improve with time.      Tonsillar fossa is still healing. Evidence of thrush. Will continue her Nystatin     Follow up 1-2 months...\"      -Patient has also been in contact with Registered Dietician. Per chart review of telephone encounter on 02/04/2025,  \"...Pt states that she got the delivery of [employer] Formula Peptide on Friday. She reports yesterday she used 2 cartons via tube (AM and PM) and she states she did not have diarrhea. She states she did not have diarrhea today either. She feels that she is tolerating this formula better. I reviewed with her that banana flakes are an option to put through the tube but if she feels that this formula is helping, we can hold off. She states this sounded like a good plan.      The pt reports it hurts to swallow and therefore she is hardly taking anything by mouth.  I encouraged the patient to try to increase [employer] to at least 3x/day over the next week given that she has lost weight. Ideally her goal is 4x/day. I reminded her to flush her tube with 60-120mL before/after feeding as well as flushing with 2-3 syringes (120-180mL) every few hours to help stay hydrated.      We decided on in person appointment in two weeks. Appointment scheduled for 2/19 at [facility] during " "infusion. I encouraged the patient to call me in the meantime if she is having trouble meeting nutrition goals or if having worsening diarrhea. Pt verbalizes understanding...\"      Short-term goals:  Patient will receive a videofluoroscopic swallow study (VFSS) to assess her current swallowing function to r/o penetration or aspiration, to determine the safest, least restrictive diet, and to recommended the appropriate rehabilitation exercises (to be achieved in 2-3 weeks).     Patient will be educated on safe swallowing compensatory strategies (e.g., upright posture, small bites/sips, slow rate, alternation of consistencies, multiple swallows, effortful swallow, etc.) to facilitate increased safety with P.O. intake (to be achieved in 4-6 weeks).     Patient will tolerate P.O. trials of her recommended diet with the implementation of safe swallowing strategies without overt s/sx of penetration and/or aspiration during skilled therapy sessions in this certification period (to be achieved in 4-6 weeks).     Traditional VitalStim     Channel(s) used: 1, 2   Placement: 3a   Duty Cycle: 57/1 s   Frequency: 80 pulses per second   Phase Duration: 300 microseconds   Treatment Duration: 32 minutes   Min mA level: 9.0 mA   Max mA level: 16.0 mA      Patient tolerated NMES in conjunction with pharyngeal/laryngeal strengthening exercises and P.O. intake. (The patient received instruction on the potential benefits from NMES treatment, including neuromuscular re-education and muscle strengthening.) Skin condition post-NMES: intact.      Patient consumed the following during today’s session: Italian ice (cherry flavor), chocolate pudding, and water (thin liquid) via cup sips. NMES remained on during pharyngeal strengthening exercises (see below).    Patient reported that the cherry flavoring of the Italian ice caused a burning sensation in the back of her throat, similar to what she experiences at home when she drinks orange juice. " "Trials were stopped when patient experienced this sensitivity. Patient was willing to trial chocolate pudding instead. After a few small bites, patient requested to end P.O. trials.     Oral containment was WFL. Increased time required for bolus formation/control and AP transfer. Swallow initiation appeared to be delayed. Reduced hyolaryngeal elevation and excursion noted upon palpation. Patient implemented small bites and slow rate with P.O. intake, as well as liquid washes as needed. Patient with symptoms of throat clearing and coughing on P.O. trials. Patient expectorated food bolus (chocolate pudding) into napkin. Patient noted that it takes 2-3 swallows to get anything down, and that she coughs it up. Vocal quality was judged to be \"wet\" sounding during trials.     Patient has been educated that she is at high risk for aspiration, choking, and inadequate nutrition/hydration. NPO status is recommended at this time with alternative means for nutrition/hydration/medications. Patient is s/p PEG tube placement and is reportedly using 2 cartons of TF ([employer] Peptide) via tube in the AM and PM. Patient follows with Nutrition and was in contact with RD via telephone on 02/04/2025 per chart review. Due to odynophagia and weight loss, RD encouraged patient to try to increase TF to at least 3x/day over the next week, with a goal of eventually 4x/day. Patient was also recommended by RD to flush her tube as directed and to stay hydrated. This clinician reviewed RD's recommendations with patient during today's session. Reciprocal comprehension verbally expressed.    Patient has been provided with aspiration precautions. Patient has had weight loss and increased coughing with P.O. intake, but denied any recent low grade fever, increased congestion, respiratory changes. If patient desires small sips of thin liquid, it was recommended to her that she complete strict oral care prior to P.O. intake ([employer]ark Free Water " Protocol). Reciprocal comprehension verbally expressed.     Patient will perform oral motor exercises using IOPI device on lingual muscles to facilitate increased function and strength by 25% for improved swallowing function (to be achieved in 6-8 weeks).     Patient will independently complete pharyngeal strengthening exercises (e.g., Effortful swallow, Mendelsohn, Evelia) daily to facilitate increased pharyngeal strength and coordination (to be achieved in 4-6 weeks).   -Patient completed the following pharyngeal strengthening exercises during today's session:  *Effortful swallow: 2 sets of 5 reps  *Mendelsohn: 2 sets of 5 reps  *Evelia: 1 set of 5 reps     Plan:  -Patient was provided with home exercises/activities to target goals in plan of care at the end of today's session.  -Continue with current plan of care.

## 2025-02-03 ENCOUNTER — HOSPITAL ENCOUNTER (OUTPATIENT)
Dept: INFUSION CENTER | Facility: CLINIC | Age: 70
Discharge: HOME | End: 2025-02-03
Payer: MEDICARE

## 2025-02-03 ENCOUNTER — TELEPHONE (OUTPATIENT)
Age: 70
End: 2025-02-03

## 2025-02-03 ENCOUNTER — PATIENT OUTREACH (OUTPATIENT)
Dept: HEMATOLOGY ONCOLOGY | Facility: CLINIC | Age: 70
End: 2025-02-03

## 2025-02-03 VITALS
TEMPERATURE: 96.3 F | SYSTOLIC BLOOD PRESSURE: 129 MMHG | RESPIRATION RATE: 16 BRPM | HEART RATE: 105 BPM | DIASTOLIC BLOOD PRESSURE: 88 MMHG

## 2025-02-03 DIAGNOSIS — C50.411 MALIGNANT NEOPLASM OF UPPER-OUTER QUADRANT OF RIGHT BREAST IN FEMALE, ESTROGEN RECEPTOR POSITIVE (HCC): Primary | ICD-10-CM

## 2025-02-03 DIAGNOSIS — Z17.0 MALIGNANT NEOPLASM OF UPPER-OUTER QUADRANT OF RIGHT BREAST IN FEMALE, ESTROGEN RECEPTOR POSITIVE (HCC): Primary | ICD-10-CM

## 2025-02-03 LAB
ALBUMIN SERPL BCG-MCNC: 3.4 G/DL (ref 3.5–5)
ALP SERPL-CCNC: 104 U/L (ref 34–104)
ALT SERPL W P-5'-P-CCNC: 15 U/L (ref 7–52)
ANION GAP SERPL CALCULATED.3IONS-SCNC: 7 MMOL/L (ref 4–13)
AST SERPL W P-5'-P-CCNC: 27 U/L (ref 13–39)
BASOPHILS # BLD AUTO: 0.01 THOUSANDS/ΜL (ref 0–0.1)
BASOPHILS NFR BLD AUTO: 1 % (ref 0–1)
BILIRUB SERPL-MCNC: 0.56 MG/DL (ref 0.2–1)
BUN SERPL-MCNC: 14 MG/DL (ref 5–25)
CALCIUM ALBUM COR SERPL-MCNC: 10.2 MG/DL (ref 8.3–10.1)
CALCIUM SERPL-MCNC: 9.7 MG/DL (ref 8.4–10.2)
CHLORIDE SERPL-SCNC: 108 MMOL/L (ref 96–108)
CO2 SERPL-SCNC: 25 MMOL/L (ref 21–32)
CREAT SERPL-MCNC: 1.09 MG/DL (ref 0.6–1.3)
EOSINOPHIL # BLD AUTO: 0.03 THOUSAND/ΜL (ref 0–0.61)
EOSINOPHIL NFR BLD AUTO: 2 % (ref 0–6)
ERYTHROCYTE [DISTWIDTH] IN BLOOD BY AUTOMATED COUNT: 15.9 % (ref 11.6–15.1)
GFR SERPL CREATININE-BSD FRML MDRD: 52 ML/MIN/1.73SQ M
GLUCOSE SERPL-MCNC: 115 MG/DL (ref 65–140)
HCT VFR BLD AUTO: 27.3 % (ref 34.8–46.1)
HGB BLD-MCNC: 8.9 G/DL (ref 11.5–15.4)
IMM GRANULOCYTES # BLD AUTO: 0.01 THOUSAND/UL (ref 0–0.2)
IMM GRANULOCYTES NFR BLD AUTO: 1 % (ref 0–2)
LYMPHOCYTES # BLD AUTO: 0.47 THOUSANDS/ΜL (ref 0.6–4.47)
LYMPHOCYTES NFR BLD AUTO: 38 % (ref 14–44)
MAGNESIUM SERPL-MCNC: 1.7 MG/DL (ref 1.9–2.7)
MCH RBC QN AUTO: 29.9 PG (ref 26.8–34.3)
MCHC RBC AUTO-ENTMCNC: 32.6 G/DL (ref 31.4–37.4)
MCV RBC AUTO: 92 FL (ref 82–98)
MONOCYTES # BLD AUTO: 0.14 THOUSAND/ΜL (ref 0.17–1.22)
MONOCYTES NFR BLD AUTO: 11 % (ref 4–12)
NEUTROPHILS # BLD AUTO: 0.57 THOUSANDS/ΜL (ref 1.85–7.62)
NEUTS SEG NFR BLD AUTO: 47 % (ref 43–75)
NRBC BLD AUTO-RTO: 0 /100 WBCS
PLATELET # BLD AUTO: 149 THOUSANDS/UL (ref 149–390)
PMV BLD AUTO: 10 FL (ref 8.9–12.7)
POTASSIUM SERPL-SCNC: 4.2 MMOL/L (ref 3.5–5.3)
PROT SERPL-MCNC: 7.3 G/DL (ref 6.4–8.4)
RBC # BLD AUTO: 2.98 MILLION/UL (ref 3.81–5.12)
SODIUM SERPL-SCNC: 140 MMOL/L (ref 135–147)
WBC # BLD AUTO: 1.23 THOUSAND/UL (ref 4.31–10.16)

## 2025-02-03 PROCEDURE — 85025 COMPLETE CBC W/AUTO DIFF WBC: CPT | Performed by: INTERNAL MEDICINE

## 2025-02-03 PROCEDURE — 96360 HYDRATION IV INFUSION INIT: CPT

## 2025-02-03 PROCEDURE — 80053 COMPREHEN METABOLIC PANEL: CPT | Performed by: INTERNAL MEDICINE

## 2025-02-03 PROCEDURE — 83735 ASSAY OF MAGNESIUM: CPT | Performed by: INTERNAL MEDICINE

## 2025-02-03 RX ADMIN — SODIUM CHLORIDE 1000 ML: 0.9 INJECTION, SOLUTION INTRAVENOUS at 13:24

## 2025-02-03 NOTE — PROGRESS NOTES
Patient tolerated hydration without complications. Port flushed, good blood return noted. Patient aware of next appointment 2/5 at 1200. Patient declined AVS.

## 2025-02-03 NOTE — PROGRESS NOTES
"Daily Speech Treatment Note    Today's date: 2025  Patient’s name: Maira Moura  : 1955  MRN: [id number]  Safety measures: HTN, CVA, GERD, active CA, s/p PEG tube placement, aspiration precautions  Current recommended diet: PEG tube for nutrition/hydration/medications   Referring provider: Harika Medina DO    Encounter Diagnosis     ICD-10-CM    1. Dysphagia, oropharyngeal phase  R13.12       2. Status post tonsillectomy  Z90.89       3. 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    Objective/Assessment:  -Reviewed patient's home exercises/activities completed since last appointment. Patient reported that she has been working on her exercises at home.    Short-term goals:  Patient will receive a videofluoroscopic swallow study (VFSS) to assess her current swallowing function to r/o penetration or aspiration, to determine the safest, least restrictive diet, and to recommended the appropriate rehabilitation exercises (to be achieved in 2-3 weeks).     Patient will be educated on safe swallowing compensatory strategies (e.g., upright posture, small bites/sips, slow rate, alternation of consistencies, multiple swallows, effortful swallow, etc.) to facilitate increased safety with P.O. intake (to be achieved in 4-6 weeks).     Patient will tolerate P.O. trials of her recommended diet with the implementation of safe swallowing strategies without overt s/sx of penetration and/or aspiration during skilled therapy sessions in this certification period (to be achieved in 4-6 " weeks).     Traditional VitalStim     Channel(s) used: 1, 2   Placement: 3a   Duty Cycle: 57/1 s   Frequency: 80 pulses per second   Phase Duration: 300 microseconds   Treatment Duration: 25 minutes   Min mA level: 6.0 mA   Max mA level: 8.5 mA      Patient tolerated NMES in conjunction with pharyngeal/laryngeal strengthening exercises and P.O. intake. (The patient received instruction on the potential benefits from NMES treatment, including neuromuscular re-education and muscle strengthening.) Skin condition post-NMES: intact.      Patient consumed the following during today’s session: Trials of nectar-thick lemon water via side of cup -- NMES remained on during pharyngeal strengthening exercises (see below).     In total, patient completed 15 sips of NTL throughout today's session.    Oral containment and bolus formation/control were judged to be WFL. Increased time AP transfer. Swallow initiation appeared to be delayed. Reduced hyolaryngeal elevation and excursion noted upon palpation. Patient implemented small sips, multiple swallows, and slow rate with liquid P.O. Throat clear x 5 and cough x1 during trials.     Patient will perform oral motor exercises using IOPI device on lingual muscles to facilitate increased function and strength by 25% for improved swallowing function (to be achieved in 6-8 weeks).     Patient will independently complete pharyngeal strengthening exercises (e.g., Effortful swallow, Mendelsohn, Evelia) daily to facilitate increased pharyngeal strength and coordination (to be achieved in 4-6 weeks).   -Patient completed the following pharyngeal strengthening exercises during today's session:  *Effortful swallow: 1 set of 5 reps  *Mendelsohn: 1 set of 5 reps  *Evelia: 1 set of 5 reps     Plan:  -Patient was provided with home exercises/activities to target goals in plan of care at the end of today's session.  -Continue with current plan of care.

## 2025-02-03 NOTE — PROGRESS NOTES
Pt called to confirm she is scheduled for transportation today to her 1pm infusion appointment.  I confirmed via round trip that she is scheduled for transportation for today. She understood, and was thankful for the assistance

## 2025-02-03 NOTE — TELEPHONE ENCOUNTER
Spoke with patient and reminded her of her appt with Dr. Medina tomorrow at 0940. I informed her that [facility] will pick her up around 9am. Patient verbalized understanding and is in agreement with the plan.

## 2025-02-04 ENCOUNTER — TELEPHONE (OUTPATIENT)
Dept: HEMATOLOGY ONCOLOGY | Facility: CLINIC | Age: 70
End: 2025-02-04

## 2025-02-04 ENCOUNTER — TELEPHONE (OUTPATIENT)
Dept: NUTRITION | Facility: HOSPITAL | Age: 70
End: 2025-02-04

## 2025-02-04 ENCOUNTER — OFFICE VISIT (OUTPATIENT)
Dept: HEMATOLOGY ONCOLOGY | Facility: CLINIC | Age: 70
End: 2025-02-04
Payer: MEDICARE

## 2025-02-04 VITALS
HEART RATE: 100 BPM | WEIGHT: 161.8 LBS | SYSTOLIC BLOOD PRESSURE: 140 MMHG | BODY MASS INDEX: 26 KG/M2 | HEIGHT: 66 IN | RESPIRATION RATE: 16 BRPM | TEMPERATURE: 97.5 F | DIASTOLIC BLOOD PRESSURE: 90 MMHG | OXYGEN SATURATION: 98 %

## 2025-02-04 DIAGNOSIS — D69.6 THROMBOCYTOPENIA (HCC): ICD-10-CM

## 2025-02-04 DIAGNOSIS — C18.9 METASTATIC COLON CANCER TO LIVER (HCC): Primary | ICD-10-CM

## 2025-02-04 DIAGNOSIS — N18.31 CHRONIC KIDNEY DISEASE, STAGE 3A (HCC): ICD-10-CM

## 2025-02-04 DIAGNOSIS — C78.7 METASTATIC COLON CANCER TO LIVER (HCC): Primary | ICD-10-CM

## 2025-02-04 DIAGNOSIS — I13.0 HYPERTENSIVE HEART AND RENAL DISEASE WITH CONGESTIVE HEART FAILURE (HCC): ICD-10-CM

## 2025-02-04 DIAGNOSIS — D53.9 NUTRITIONAL ANEMIA: ICD-10-CM

## 2025-02-04 DIAGNOSIS — Z17.0 MALIGNANT NEOPLASM OF UPPER-OUTER QUADRANT OF RIGHT BREAST IN FEMALE, ESTROGEN RECEPTOR POSITIVE (HCC): ICD-10-CM

## 2025-02-04 DIAGNOSIS — C50.411 MALIGNANT NEOPLASM OF UPPER-OUTER QUADRANT OF RIGHT BREAST IN FEMALE, ESTROGEN RECEPTOR POSITIVE (HCC): ICD-10-CM

## 2025-02-04 DIAGNOSIS — C09.9 RIGHT TONSILLAR SQUAMOUS CELL CARCINOMA (HCC): ICD-10-CM

## 2025-02-04 PROCEDURE — 99215 OFFICE O/P EST HI 40 MIN: CPT | Performed by: INTERNAL MEDICINE

## 2025-02-04 PROCEDURE — G2211 COMPLEX E/M VISIT ADD ON: HCPCS | Performed by: INTERNAL MEDICINE

## 2025-02-04 NOTE — ASSESSMENT & PLAN NOTE
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 On completion of definitive chemoradiation, repeat PET-CT was obtained on December 27th, 2024; and demonstrated interval near-complete metabolic response to therapy. Given the above-mentioned, will proceed with active-surveillance at the present time. Patient expressed understanding and agreement with the above-mentioned plan.    Summary of Recommendations:  Continue active-surveillance:  Follow-up with Radiation Oncology, and Otolaryngology, as scheduled.  Continue surveillance-nasolaryngoscopy per Otolaryngology.  Patient to continue working with Speech-Therapy to improve swallowing-function.  Recommend close interval follow-up in approximately 2-weeks (See above).  Plan to monitor laboratory-parameters, and re-image every 3-months.

## 2025-02-04 NOTE — ASSESSMENT & PLAN NOTE
Lastly, patient has a history of early-stage hormone-positive HER-2 negative right breast cancer (Status-post right lumpectomy with sentinel lymph node biopsy on December 20th, 2018 and whole-breast radiation completed in April 2019; On adjuvant endocrine-therapy with Anastrozole since February 2019). She remains on Anastrozole 1 mg Daily; and continues to tolerate the above-mentioned appropriately, without significant treatment-related toxicities. Will continue, accordingly. Patient to continue with Bilateral Screening Mammography on an annual basis. She expressed understanding and agreement with the above-mentioned plan.    Summary of Recommendations:  Continue Anastrozole 1 mg Daily.  Obtain Bilateral Screening Mammography on an annual-basis.  Recommend close interval follow-up, as indicated above.

## 2025-02-04 NOTE — ASSESSMENT & PLAN NOTE
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 Unfortunately, patient now exhibits interval progression of disease involving the liver. She is also noted to have a new subcentimeter right lung nodule (Measuring 3-millimeters), and stable large right adnexal mass with low-level FDG-activity. Given the above-mentioned, patient was reinitiated on systemic-therapy with FOLFIRI with fifty-percent dose-reduction of Irinotecan due to existing diarrhea (Cycle 1 initiated on January 8th, 2025). Patient has since completed two-cycles of the above-mentioned (Last administered on January 22nd, 2025).    On evaluation this morning, patient is clinically unwell. She is not tolerating dose-attenuated FOLFIRI appropriately, as evidenced by significant treatment-relative toxicities, including fatigue, and diarrhea (Note: Diarrhea is likely complicated by tube-feeds). Repeat laboratory-parameters demonstrate leukopenia with moderate neutropenia (WBC: 1.23 ANC: 570), stable normocytic anemia (Hemoglobin: 8.9 MCV: 92), and mild hypercalcemia (Corrected Calcium: 10.2). Given the above-mentioned, will cancel Cycle 3, due to poor-tolerance of treatment, as well as moderate-to-severe neutropenia. Plan for  in-office reassessment in approximately two-weeks, prior to resumption of systemic-therapy. Will add growth-factor support now, as well as with reinitiation of Cycle 4. Will also consider further dose-attenuation, pending outpatient follow-up. In the interim, counseled patient regarding neutropenic-precautions. Will continue thrice-weekly intravenous hydration, as well. Discussed case with Oncology Nutrition for further assistance with tube-feed recommendations, to facilitate improved tolerance. Remaining recommendations, as outlined below. Patient expressed understanding and agreement with the above-mentioned plan.    Note: Prior next-generation sequencing from March 2024 demonstrates a pathogenic-variant of KRAS. No BRAF, or HER-2 mutations detected. Additionally, patient is noted to be MMR-intact, MSI-stable, have low Tumor Mutational [facility] (TMB: 6 mutations/Mb), and negative PD-L1 (PD-L1: 0%).    Summary of Recommendations:  Continue Cyanocobalamin 1,000 mcg PO Daily.  Obtain repeat Vitamin B12 level, given worsening peripheral-neuropathy.  Continue thrice-weekly intravenous-hydration (Monday, Wednesday, and Friday).  Contacted Oncology Nutrition for assistance with poor tube-feed tolerance.  Cancel Cycle 3 due to neutropenia:  Will tentatively plan for resumption of Cycle 4 upon in-office reassessment:  Add growth-factor support beginning with Cycle 4.  Note: Add growth-factor this week, given neutropenia, as well.  Plan for further dose-reductions, pending re-evaluation.  Recommend close interval follow-up on February 18th, 2024, prior to Cycle 4.

## 2025-02-04 NOTE — TELEPHONE ENCOUNTER
RD received message from Carole Newman DO regarding patient's diarrhea and banana flakes. This RD has been unable to get in touch with patient. RD contacted patient again today following this message to touch base regarding nutrition. Pt did not answer and left VM.     Patient called RD back in the next few minutes. Pt states that she got the delivery of [employer] Peptide on Friday. She reports yesterday she used 2 cartons via tube (AM and PM) and she states she did not have diarrhea. She states she did not have diarrhea today either. She feels that she is tolerating this formula better. I reviewed with her that banana flakes are an option to put through the tube but if she feels that this formula is helping, we can hold off. She states this sounded like a good plan.     The pt reports it hurts to swallow and therefore she is hardly taking anything by mouth.  I encouraged the patient to try to increase [employer] to at least 3x/day over the next week given that she has lost weight. Ideally her goal is 4x/day. I reminded her to flush her tube with 60-120mL before/after feeding as well as flushing with 2-3 syringes (120-180mL) every few hours to help stay hydrated.     We decided on in person appointment in two weeks. Appointment scheduled for 2/19 at [facility] during infusion. I encouraged the patient to call me in the meantime if she is having trouble meeting nutrition goals or if having worsening diarrhea. Pt verbalizes understanding.

## 2025-02-04 NOTE — PROGRESS NOTES
Medical Oncology: Outpatient Progress Note:                  Name: Maira Moura      : 1955      MRN: [id number]                 Encounter Provider: Harika Medina DO  Encounter Date: 2025   Encounter department: [facility]  Assessment & Plan  Metastatic colon cancer to liver (HCC)  Patient is a 69-year-old postmenopausal female, with a complex Oncologic history, including synchronous de naveed metastatic adenocarcinoma of the mid-ascending colon (Complicated by partial-obstruction requiring right hemicolectomy on 2024) with biopsy-proven oligometastatic disease to the liver (Status-post cryoablation on 2024) with further disease progression in 2024 (Currently on FOLFIRI with dose-attenuation of Irinotecan); who presents today for interval follow-up. 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 Unfortunately, patient now exhibits interval progression of disease involving the liver. She is also noted to have a new subcentimeter right lung nodule (Measuring 3-millimeters), and stable large right adnexal mass with low-level FDG-activity. Given the above-mentioned, patient was reinitiated on systemic-therapy with FOLFIRI with fifty-percent dose-reduction of Irinotecan due to existing diarrhea (Cycle 1 initiated on 2025). Patient has since completed two-cycles of the above-mentioned (Last administered on 2025).    On evaluation this morning, patient is clinically unwell. She is not tolerating dose-attenuated FOLFIRI appropriately, as evidenced by significant treatment-relative toxicities, including fatigue, and diarrhea (Note: Diarrhea is likely complicated by  tube-feeds). Repeat laboratory-parameters demonstrate leukopenia with moderate neutropenia (WBC: 1.23 ANC: 570), stable normocytic anemia (Hemoglobin: 8.9 MCV: 92), and mild hypercalcemia (Corrected Calcium: 10.2). Given the above-mentioned, will cancel Cycle 3, due to poor-tolerance of treatment, as well as moderate-to-severe neutropenia. Plan for in-office reassessment in approximately two-weeks, prior to resumption of systemic-therapy. Will add growth-factor support now, as well as with reinitiation of Cycle 4. Will also consider further dose-attenuation, pending outpatient follow-up. In the interim, counseled patient regarding neutropenic-precautions. Will continue thrice-weekly intravenous hydration, as well. Discussed case with Oncology Nutrition for further assistance with tube-feed recommendations, to facilitate improved tolerance. Remaining recommendations, as outlined below. Patient expressed understanding and agreement with the above-mentioned plan.    Note: Prior next-generation sequencing from March 2024 demonstrates a pathogenic-variant of KRAS. No BRAF, or HER-2 mutations detected. Additionally, patient is noted to be MMR-intact, MSI-stable, have low Tumor Mutational [place] (TMB: 6 mutations/Mb), and negative PD-L1 (PD-L1: 0%).    Summary of Recommendations:  Continue Cyanocobalamin 1,000 mcg PO Daily.  Obtain repeat Vitamin B12 level, given worsening peripheral-neuropathy.  Continue thrice-weekly intravenous-hydration (Monday, Wednesday, and Friday).  Contacted Oncology Nutrition for assistance with poor tube-feed tolerance.  Cancel Cycle 3 due to neutropenia:  Will tentatively plan for resumption of Cycle 4 upon in-office reassessment:  Add growth-factor support beginning with Cycle 4.  Note: Add growth-factor this week, given neutropenia, as well.  Plan for further dose-reductions, pending re-evaluation.  Recommend close interval follow-up on February 18th, 2024, prior to Cycle 4.       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 On completion of definitive chemoradiation, repeat PET-CT was obtained on December 27th, 2024; and demonstrated interval near-complete metabolic response to therapy. Given the above-mentioned, will proceed with active-surveillance at the present time. Patient expressed understanding and agreement with the above-mentioned plan.    Summary of Recommendations:  Continue active-surveillance:  Follow-up with Radiation Oncology, and Otolaryngology, as scheduled.  Continue surveillance-nasolaryngoscopy per Otolaryngology.  Patient to continue working with Speech-Therapy to improve swallowing-function.  Recommend close interval follow-up in approximately 2-weeks (See above).  Plan to monitor laboratory-parameters, and re-image every 3-months.       History of Present Illness   Chief Complaint: Outpatient Follow-Up.  Interval History: Maira Moura is a 69-year-old postmenopausal female, with a complex Oncologic history, including early-stage hormone-positive HER-2 negative right breast cancer (Status-post right lumpectomy with sentinel lymph node biopsy on December 20th, 2018 and whole-breast radiation completed in April 2019; On adjuvant endocrine-therapy with Anastrozole since February 2019), as well as synchronous de naveed metastatic adenocarcinoma of the mid-ascending colon (Complicated by partial-obstruction requiring right hemicolectomy on March 15th, 2024) with biopsy-proven oligometastatic disease to the liver (Status-post cryoablation on June 3rd, 2024), and unresectable 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 Unfortunately, patient now exhibits interval progression of disease involving the liver. She is also noted to have a new subcentimeter right lung nodule (Measuring 3-millimeters), and stable large right adnexal mass with low-level FDG-activity. Given the above-mentioned, patient was reinitiated on systemic-therapy with FOLFIRI with fifty-percent dose-reduction of Irinotecan due to existing diarrhea (Cycle 1 initiated on January 8th, 2025). Patient has since completed two-cycles of the above-mentioned (Last administered on January 22nd, 2025).    On evaluation this morning, patient states that she feels unwell. She further clarifies the she is significantly fatigued from treatment; and is presently spending more than fifty-percent of her day resting. She qualifies that she is tolerating her activities of daily living independently; however, on further questioning, it appears that she does not have assistance at home (Note: Patient resides with her daughter; but her daughter works the entirety of the day). Additionally, patient endorses significant residual peripheral neuropathy involving the bilateral hands and feet. She reports difficulty with fine-motor functions, including buttoning buttons. She also reports mechanical-falls. In addition, patient is not tolerating oral-intake. She is exclusively utilizing her PEG-tube for tube-feeds, once daily. Patient attributes this to recent diagnosis of thrush; for which she is using Nystatin Swish and Swallow. Patient denies nausea, and vomiting. She endorses intractable large-volume watery stools, associated with tube-feeds. She  qualifies that this resolved yesterday; however, had previously been a daily occurrence. She continues to receive thrice-weekly hydration. Lastly, patient's weight continues to decline (Weight on December 31st: 182-pounds Current Weight: 161-pounds). She has no further complaints or concerns at the given time.    Note: Contacted Oncology Nutrition, who confirms that patient's tube-feed formula was adjusted for gastrointestinal-upset. Will see if banana-flakes will be covered if patient's diarrhea returns.    Oncology History   Cancer Staging   Malignant neoplasm of right female breast (HCC)  Staging form: Breast, AJCC 8th Edition  - Pathologic: Stage IA (pT2, pN0, cM0, G2, ER: Positive, GA: Positive, HER2: Negative) - Signed by Rosalva Drake MD on 1/11/2019  Histologic grading system: 3 grade system    Metastatic colon cancer to liver (HCC)  Staging form: Colon and Rectum, AJCC 8th Edition  - Clinical stage from 7/13/2023: cT3, cN0, pM1 - Signed by Harika Medina DO on 9/28/2023  Total positive nodes: 0  - Pathologic: pT3, pN0, cM1 - Signed by Vickie Israel MD on 4/3/2024  Total positive nodes: 0    Right tonsillar squamous cell carcinoma (HCC)  Staging form: Pharynx - Oropharynx, AJCC 8th Edition  - Clinical stage from 7/27/2023: Stage III (cT1, cN3, cM0, p16+) - Signed by Evan Plummer MD on 7/27/2023  Stage prefix: Initial diagnosis  Oncology History Overview Note   2/2019 - pT2N0 breast cancer treated with anastrozole, oncotype 13, ER+ GA+ Her2 neg     7/2023 - metastatic ascending colon adenocarcinoma to liver     Caris - KRAS G12D, CAIN, PD-L1 0%     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, unresectable     7/31/2023 - FOLFOX     11/20/2023 - opdivo added to FOLFOX     12/18/2023-cycle 11-20% dose reduction of 5-FU bolus and oxaliplatin due to increased fatigue     Jan 2024 - oxaliplatin removed from treatment plan due to neuropathy - PET scan shows good disease control in all  "areas except colon lesion     3/2024 - right hemicolectomy - pT3N0     6/3/24 - cryoablation to liver (no interruption to systemic therapy)     7/30/2024 - stop folfox     9/2024 - 10/2024 - cis/RT to tonsils and cervical Lns    12/2024 - disease progression of colon cancer in the liver     Malignant neoplasm of right female breast (HCC)   11/23/2018 Initial Diagnosis    Malignant neoplasm of right female breast (HCC)     11/23/2018 Biopsy    Rt Breast US BX 1100 8cmfn, 4 passes 12g Marquee:  - Invasive breast carcinoma of no special type (ductal NST/invasive ductal carcinoma).  - grade 2  - %  - SC 95%  - HER2 negative     12/20/2018 Surgery    Right partial mastectomy and SLN biopsy:  Infiltrating ductal carcinoma, Grade 2/3, felt to be between 2.0 cm and 2.5 cm in greatest dimension  - No invasive tumor is seen at inked margins, but there is a focus of DCIS seen within approximately 1mm of the inked medial margin  - no LVI  - no LCIS  - 0/2 LN's  at least Stage IIA - pT2, pN0, cM0, G2.    - Dr Coronado     12/20/2018 -  Cancer Staged    Stage IIA - pT2, pN0, cM0, G2.       1/4/2019 Genomic Testing    Oncotype DX score: 13     2/1/2019 -  Hormone Therapy    anastrozole 1 mg daily as adjuvant endocrine therapy    - Dr Daley       2/14/2019 - 4/3/2019 Radiation    Course: C1    Plan ID Energy Fractions Dose per Fraction (cGy) Dose Correction (cGy) Total Dose Delivered (cGy) Elapsed Days   R Breast 10X/6X 25 / 25 200 0 5,000 40   R BRST BOOST 16E 5 / 5 200 0 1,000 7      Treatment dates:  C1: 2/14/2019 - 4/3/2019       Metastatic colon cancer to liver (HCC)   7/6/2023 Genetic Testing    CARIS Results:   KRAS Pathogenic Variant Exon 2  p.G12D : lack of benefit of cetuximab, panitumumab Level 2   No other actionable mutation; MSI stable; TMB low   MiFOLOXAi Results: \"Decreased Benefit of FOLFOX + Bevacizumab in first line metastatic CRC.  This patient may achieve improved results by receiving an alternative to " "FOLFOX, such as FOLFIRI, as their initial regimen. As an adjustment to frontline FOLFOXIRI following toxicity: This patient may achieve improved results by removing the oxaliplatin portion of their regimen\".         7/11/2023 Initial Diagnosis    Metastatic colon cancer to liver (HCC)     7/13/2023 -  Cancer Staged    Staging form: Colon and Rectum, AJCC 8th Edition  - Clinical stage from 7/13/2023: cT3, cN0, pM1 - Signed by Harika Medina DO on 9/28/2023  Total positive nodes: 0       7/31/2023 - 8/1/2024 Chemotherapy    cyanocobalamin, 1,000 mcg, Intramuscular, Every 30 days, 7 of 9 cycles  Administration: 1,000 mcg (5/9/2024), 1,000 mcg (5/24/2024), 1,000 mcg (6/20/2024), 1,000 mcg (7/3/2024), 1,000 mcg (7/18/2024), 1,000 mcg (8/1/2024)  potassium chloride, 20 mEq, Intravenous, Once, 2 of 2 cycles  Administration: 20 mEq (11/6/2023), 20 mEq (11/20/2023)  alteplase (CATHFLO), 2 mg, Intracatheter, Every 1 Minute as needed, 21 of 23 cycles  Administration: 2 mg (1/3/2024)  pegfilgrastim (NEULASTA), 6 mg, Subcutaneous, Once, 12 of 12 cycles  Administration: 6 mg (10/25/2023), 6 mg (11/8/2023), 6 mg (11/22/2023), 6 mg (12/6/2023), 6 mg (12/20/2023), 6 mg (1/12/2024), 6 mg (1/25/2024), 6 mg (4/25/2024), 6 mg (5/9/2024), 6 mg (5/24/2024), 6 mg (6/20/2024)  fluorouracil (ADRUCIL), 895 mg, Intravenous, Once, 21 of 23 cycles  Dose modification: 320 mg/m2 (original dose 400 mg/m2, Cycle 11)  Administration: 900 mg (7/31/2023), 900 mg (8/14/2023), 900 mg (8/28/2023), 900 mg (9/11/2023), 900 mg (9/25/2023), 900 mg (10/9/2023), 900 mg (10/23/2023), 895 mg (11/6/2023), 895 mg (11/20/2023), 895 mg (12/4/2023), 700 mg (12/18/2023), 680 mg (1/10/2024), 680 mg (1/23/2024), 625 mg (4/23/2024), 625 mg (5/7/2024), 625 mg (5/22/2024), 625 mg (6/4/2024), 625 mg (6/18/2024), 625 mg (7/1/2024), 625 mg (7/16/2024), 625 mg (7/30/2024)  nivolumab (OPDIVO) IVPB, 240 mg (100 % of original dose 240 mg), Intravenous, Once, 13 of 15 " cycles  Dose modification: 240 mg (original dose 240 mg, Cycle 9)  Administration: 240 mg (11/20/2023), 240 mg (12/4/2023), 240 mg (12/18/2023), 240 mg (1/10/2024), 240 mg (1/23/2024), 240 mg (4/23/2024), 240 mg (5/7/2024), 240 mg (5/22/2024), 240 mg (6/4/2024), 240 mg (6/18/2024), 240 mg (7/1/2024), 240 mg (7/16/2024), 240 mg (7/30/2024)  leucovorin calcium IVPB, 896 mg, Intravenous, Once, 21 of 23 cycles  Administration: 900 mg (7/31/2023), 900 mg (8/14/2023), 900 mg (8/28/2023), 900 mg (9/11/2023), 900 mg (9/25/2023), 900 mg (10/9/2023), 900 mg (10/23/2023), 900 mg (11/6/2023), 900 mg (11/20/2023), 900 mg (12/4/2023), 900 mg (12/18/2023), 850 mg (1/10/2024), 850 mg (1/23/2024), 800 mg (4/23/2024), 800 mg (5/7/2024), 800 mg (5/22/2024), 800 mg (6/4/2024), 800 mg (6/18/2024), 800 mg (7/1/2024), 800 mg (7/16/2024), 800 mg (7/30/2024)  oxaliplatin (ELOXATIN) chemo infusion, 85 mg/m2 = 190.4 mg, Intravenous, Once, 12 of 12 cycles  Dose modification: 68 mg/m2 (original dose 85 mg/m2, Cycle 11, Reason: Other (Must fill in a comment), Comment: increased fatigue)  Administration: 190.4 mg (7/31/2023), 190.4 mg (8/14/2023), 200 mg (8/28/2023), 200 mg (9/11/2023), 200 mg (9/25/2023), 200 mg (10/9/2023), 200 mg (10/23/2023), 200 mg (11/6/2023), 200 mg (11/20/2023), 190.4 mg (12/4/2023), 150 mg (12/18/2023), 150 mg (1/10/2024)  fluorouracil (ADRUCIL) ambulatory infusion Soln, 1,200 mg/m2/day = 5,375 mg, Intravenous, Over 46 hours, 21 of 23 cycles     9/26/2023 -  Cancer Staged    Staging form: Colon and Rectum, AJCC 8th Edition  - Pathologic: pT3, pN0, cM1 - Signed by Vickie Israel MD on 4/3/2024  Total positive nodes: 0       3/12/2024 Surgery    A. Terminal ileum, appendix, right colon (right hemicolectomy):  - Adenocarcinoma (5.2cm)  - Forty-nine (49) lymph nodes negative for carcinoma (0/49)    - Acellular mucin present in one (1) lymph node   - See staging synoptic (ypT3N0)     1/8/2025 -  Chemotherapy    alteplase  (CATHFLO), 2 mg, Intracatheter, Every 1 Minute as needed, 3 of 6 cycles  pegfilgrastim (NEULASTA), 6 mg, Subcutaneous, Once, 0 of 3 cycles  fluorouracil (ADRUCIL), 400 mg/m2 = 770 mg, Intravenous, Once, 2 of 5 cycles  Administration: 770 mg (1/8/2025), 770 mg (1/22/2025)  irinotecan (CAMPTOSAR) chemo infusion, 173 mg (50 % of original dose 180 mg/m2), Intravenous, Once, 2 of 5 cycles  Dose modification: 90 mg/m2 (original dose 180 mg/m2, Cycle 1, Reason: Anticipated Tolerance, Comment: 50% dose reduction due to pre-existing diarrhea)  Administration: 180 mg (1/8/2025), 180 mg (1/22/2025)  leucovorin calcium IVPB, 768 mg, Intravenous, Once, 2 of 5 cycles  Administration: 800 mg (1/8/2025), 800 mg (1/22/2025)  fluorouracil (ADRUCIL) ambulatory infusion Soln, 1,200 mg/m2/day = 4,610 mg, Intravenous, Over 46 hours, 2 of 5 cycles     Right tonsillar squamous cell carcinoma (HCC)   7/27/2023 Initial Diagnosis    Right tonsillar squamous cell carcinoma (HCC)     7/27/2023 -  Cancer Staged    Staging form: Pharynx - Oropharynx, AJCC 8th Edition  - Clinical stage from 7/27/2023: Stage III (cT1, cN3, cM0, p16+) - Signed by Evan Plummer MD on 7/27/2023  Stage prefix: Initial diagnosis       9/16/2024 - 11/13/2024 Radiation      Plan ID Energy Fractions Dose per Fraction (cGy) Dose Correction (cGy) Total Dose Delivered (cGy) Elapsed Days   Head_Neck 6X-FFF 35 / 35 200 0 7,000 58    C2: 9/16/2024 - 11/13/2024 9/17/2024 - 10/21/2024 Chemotherapy    alteplase (CATHFLO), 2 mg, Intracatheter, Every 1 Minute as needed, 6 of 6 cycles  Administration: 2 mg (10/21/2024)  CISplatin (PLATINOL) infusion, 70 mg (original dose 40 mg/m2), Intravenous, Once, 6 of 6 cycles  Dose modification: 70 mg (original dose 40 mg/m2, Cycle 1, Reason: Anticipated Tolerance), 30 mg/m2 (original dose 40 mg/m2, Cycle 4, Reason: Neuropathy, Comment: dose reduction to 30 mg/m2 due to neuropathy)  Administration: 70 mg (9/17/2024), 70 mg  "(9/23/2024), 70 mg (9/30/2024), 59.1 mg (10/7/2024), 59.1 mg (10/14/2024), 59.1 mg (10/21/2024)  sodium chloride, 500 mL, Intravenous, Once, 6 of 6 cycles  Administration: 500 mL (9/17/2024), 500 mL (9/17/2024), 500 mL (9/23/2024), 500 mL (9/23/2024), 500 mL (9/30/2024), 500 mL (9/30/2024), 500 mL (10/7/2024), 500 mL (10/7/2024), 500 mL (10/14/2024), 500 mL (10/14/2024), 500 mL (10/21/2024)  fosaprepitant (EMEND) IVPB, 150 mg, Intravenous, Once, 6 of 6 cycles  Administration: 150 mg (9/17/2024), 150 mg (9/23/2024), 150 mg (9/30/2024), 150 mg (10/7/2024), 150 mg (10/14/2024), 150 mg (10/21/2024)  IVPB builder, , Intravenous, Once, 1 of 1 cycle  Administration: Unknown dose (10/21/2024)        Review of Systems  Review of Systems:  All systems reviewed and negative except otherwise listed in the History of Present Illness.      Objective   /90 (BP Location: Left arm, Patient Position: Sitting, Cuff Size: Adult)   Pulse 100   Temp 97.5 °F (36.4 °C) (Temporal)   Resp 16   Ht 5' 5.98\" (1.676 m)   Wt 73.4 kg (161 lb 12.8 oz)   SpO2 98%   BMI 26.13 kg/m²     ECOG   3-Points.    Physical Exam:  General: Alert, and oriented; Pleasant; Ill-Appearing; Frail  HEENT: Atraumatic, and normocephalic; PERRLA; EOMI; Moist mucosal membranes; Oral Mucositis with Thrush  Cardiovascular: Tachycardia (Regular); No murmurs, rubs, or gallops; No edema  Respiratory: Clear to auscultation bilaterally; Adequate aeration; No supplemental oxygen   Abdomen: Soft, Non-tender; Non-distended; No organomegaly; Bowel sounds present  Extremities: No obvious deformities; No peripheral edema; Moves all  Neurologic: Appropriately alert, and oriented to Person, Place, and Time; No focal neurologic deficits    Labs: I have reviewed the following labs:  Lab Results   Component Value Date/Time    WBC 1.23 (L) 02/03/2025 01:24 PM    RBC 2.98 (L) 02/03/2025 01:24 PM    Hemoglobin 8.9 (L) 02/03/2025 01:24 PM    Hematocrit 27.3 (L) 02/03/2025 01:24 " PM    MCV 92 02/03/2025 01:24 PM    MCH 29.9 02/03/2025 01:24 PM    RDW 15.9 (H) 02/03/2025 01:24 PM    Platelets 149 02/03/2025 01:24 PM    Segmented % 47 02/03/2025 01:24 PM    Lymphocytes % 38 02/03/2025 01:24 PM    Monocytes % 11 02/03/2025 01:24 PM    Eosinophils Relative 2 02/03/2025 01:24 PM    Basophils Relative 1 02/03/2025 01:24 PM    Immature Grans % 1 02/03/2025 01:24 PM    Absolute Neutrophils 0.57 (L) 02/03/2025 01:24 PM     Lab Results   Component Value Date/Time    Potassium 4.2 02/03/2025 01:24 PM    Chloride 108 02/03/2025 01:24 PM    CO2 25 02/03/2025 01:24 PM    BUN 14 02/03/2025 01:24 PM    Creatinine 1.09 02/03/2025 01:24 PM    Glucose, Fasting 102 (H) 10/30/2024 04:34 AM    Calcium 9.7 02/03/2025 01:24 PM    Corrected Calcium 10.2 (H) 02/03/2025 01:24 PM    AST 27 02/03/2025 01:24 PM    ALT 15 02/03/2025 01:24 PM    Alkaline Phosphatase 104 02/03/2025 01:24 PM    Total Protein 7.3 02/03/2025 01:24 PM    Albumin 3.4 (L) 02/03/2025 01:24 PM    Total Bilirubin 0.56 02/03/2025 01:24 PM    eGFR 52 02/03/2025 01:24 PM     Patient was seen and discussed with attending physician, ALAINA Steve D.O.  Hematology-Oncology Fellow (PGY-5)

## 2025-02-05 ENCOUNTER — HOSPITAL ENCOUNTER (OUTPATIENT)
Dept: INFUSION CENTER | Facility: CLINIC | Age: 70
Discharge: HOME | End: 2025-02-05
Payer: MEDICARE

## 2025-02-05 ENCOUNTER — HOSPITAL ENCOUNTER (OUTPATIENT)
Dept: INFUSION CENTER | Facility: CLINIC | Age: 70
Discharge: HOME | End: 2025-02-05

## 2025-02-05 VITALS
RESPIRATION RATE: 18 BRPM | SYSTOLIC BLOOD PRESSURE: 103 MMHG | HEART RATE: 116 BPM | TEMPERATURE: 97.4 F | DIASTOLIC BLOOD PRESSURE: 73 MMHG

## 2025-02-05 DIAGNOSIS — Z17.0 MALIGNANT NEOPLASM OF UPPER-OUTER QUADRANT OF RIGHT BREAST IN FEMALE, ESTROGEN RECEPTOR POSITIVE (HCC): Primary | ICD-10-CM

## 2025-02-05 DIAGNOSIS — C50.411 MALIGNANT NEOPLASM OF UPPER-OUTER QUADRANT OF RIGHT BREAST IN FEMALE, ESTROGEN RECEPTOR POSITIVE (HCC): Primary | ICD-10-CM

## 2025-02-05 LAB
ALBUMIN SERPL BCG-MCNC: 3.4 G/DL (ref 3.5–5)
ALP SERPL-CCNC: 95 U/L (ref 34–104)
ALT SERPL W P-5'-P-CCNC: 15 U/L (ref 7–52)
ANION GAP SERPL CALCULATED.3IONS-SCNC: 6 MMOL/L (ref 4–13)
AST SERPL W P-5'-P-CCNC: 28 U/L (ref 13–39)
BASOPHILS # BLD AUTO: 0.01 THOUSANDS/ΜL (ref 0–0.1)
BASOPHILS NFR BLD AUTO: 1 % (ref 0–1)
BILIRUB SERPL-MCNC: 0.44 MG/DL (ref 0.2–1)
BUN SERPL-MCNC: 19 MG/DL (ref 5–25)
CALCIUM ALBUM COR SERPL-MCNC: 9.9 MG/DL (ref 8.3–10.1)
CALCIUM SERPL-MCNC: 9.4 MG/DL (ref 8.4–10.2)
CHLORIDE SERPL-SCNC: 107 MMOL/L (ref 96–108)
CO2 SERPL-SCNC: 25 MMOL/L (ref 21–32)
CREAT SERPL-MCNC: 1.38 MG/DL (ref 0.6–1.3)
EOSINOPHIL # BLD AUTO: 0.03 THOUSAND/ΜL (ref 0–0.61)
EOSINOPHIL NFR BLD AUTO: 3 % (ref 0–6)
ERYTHROCYTE [DISTWIDTH] IN BLOOD BY AUTOMATED COUNT: 16.1 % (ref 11.6–15.1)
GFR SERPL CREATININE-BSD FRML MDRD: 39 ML/MIN/1.73SQ M
GLUCOSE SERPL-MCNC: 99 MG/DL (ref 65–140)
HCT VFR BLD AUTO: 26.7 % (ref 34.8–46.1)
HGB BLD-MCNC: 8.5 G/DL (ref 11.5–15.4)
IMM GRANULOCYTES # BLD AUTO: 0.01 THOUSAND/UL (ref 0–0.2)
IMM GRANULOCYTES NFR BLD AUTO: 1 % (ref 0–2)
LYMPHOCYTES # BLD AUTO: 0.55 THOUSANDS/ΜL (ref 0.6–4.47)
LYMPHOCYTES NFR BLD AUTO: 45 % (ref 14–44)
MAGNESIUM SERPL-MCNC: 1.7 MG/DL (ref 1.9–2.7)
MCH RBC QN AUTO: 29.7 PG (ref 26.8–34.3)
MCHC RBC AUTO-ENTMCNC: 31.8 G/DL (ref 31.4–37.4)
MCV RBC AUTO: 93 FL (ref 82–98)
MONOCYTES # BLD AUTO: 0.32 THOUSAND/ΜL (ref 0.17–1.22)
MONOCYTES NFR BLD AUTO: 27 % (ref 4–12)
NEUTROPHILS # BLD AUTO: 0.27 THOUSANDS/ΜL (ref 1.85–7.62)
NEUTS SEG NFR BLD AUTO: 23 % (ref 43–75)
NRBC BLD AUTO-RTO: 0 /100 WBCS
PLATELET # BLD AUTO: 223 THOUSANDS/UL (ref 149–390)
PMV BLD AUTO: 9.9 FL (ref 8.9–12.7)
POTASSIUM SERPL-SCNC: 4.1 MMOL/L (ref 3.5–5.3)
PROT SERPL-MCNC: 7 G/DL (ref 6.4–8.4)
RBC # BLD AUTO: 2.86 MILLION/UL (ref 3.81–5.12)
SODIUM SERPL-SCNC: 138 MMOL/L (ref 135–147)
WBC # BLD AUTO: 1.19 THOUSAND/UL (ref 4.31–10.16)

## 2025-02-05 PROCEDURE — 83735 ASSAY OF MAGNESIUM: CPT | Performed by: INTERNAL MEDICINE

## 2025-02-05 PROCEDURE — 85025 COMPLETE CBC W/AUTO DIFF WBC: CPT | Performed by: INTERNAL MEDICINE

## 2025-02-05 PROCEDURE — 80053 COMPREHEN METABOLIC PANEL: CPT | Performed by: INTERNAL MEDICINE

## 2025-02-05 RX ADMIN — PEGFILGRASTIM 6 MG: 6 INJECTION SUBCUTANEOUS at 12:21

## 2025-02-05 RX ADMIN — MAGNESIUM SULFATE HEPTAHYDRATE: 500 INJECTION, SOLUTION INTRAMUSCULAR; INTRAVENOUS at 12:20

## 2025-02-05 NOTE — PROGRESS NOTES
Labs collected via port. Pt received 1L NSS with 2gm Mag and neulasta without incident. Pt declined AVS, aware of appt Friday at 12:30pm.

## 2025-02-06 ENCOUNTER — OFFICE VISIT (OUTPATIENT)
Dept: SPEECH THERAPY | Facility: CLINIC | Age: 70
End: 2025-02-06
Payer: MEDICARE

## 2025-02-06 DIAGNOSIS — R13.12 DYSPHAGIA, OROPHARYNGEAL PHASE: Primary | ICD-10-CM

## 2025-02-06 DIAGNOSIS — C09.9 RIGHT TONSILLAR SQUAMOUS CELL CARCINOMA (HCC): ICD-10-CM

## 2025-02-06 DIAGNOSIS — Z90.89 STATUS POST TONSILLECTOMY: ICD-10-CM

## 2025-02-06 PROCEDURE — 92526 ORAL FUNCTION THERAPY: CPT | Performed by: SPEECH-LANGUAGE PATHOLOGIST

## 2025-02-07 ENCOUNTER — HOSPITAL ENCOUNTER (OUTPATIENT)
Dept: INFUSION CENTER | Facility: CLINIC | Age: 70
End: 2025-02-07
Payer: MEDICARE

## 2025-02-07 DIAGNOSIS — C50.411 MALIGNANT NEOPLASM OF UPPER-OUTER QUADRANT OF RIGHT BREAST IN FEMALE, ESTROGEN RECEPTOR POSITIVE (HCC): Primary | ICD-10-CM

## 2025-02-07 DIAGNOSIS — Z17.0 MALIGNANT NEOPLASM OF UPPER-OUTER QUADRANT OF RIGHT BREAST IN FEMALE, ESTROGEN RECEPTOR POSITIVE (HCC): Primary | ICD-10-CM

## 2025-02-07 LAB
ALBUMIN SERPL BCG-MCNC: 3.1 G/DL (ref 3.5–5)
ALP SERPL-CCNC: 95 U/L (ref 34–104)
ALT SERPL W P-5'-P-CCNC: 13 U/L (ref 7–52)
ANION GAP SERPL CALCULATED.3IONS-SCNC: 8 MMOL/L (ref 4–13)
ANISOCYTOSIS BLD QL SMEAR: PRESENT
AST SERPL W P-5'-P-CCNC: 24 U/L (ref 13–39)
BASOPHILS # BLD MANUAL: 0 THOUSAND/UL (ref 0–0.1)
BASOPHILS NFR MAR MANUAL: 0 % (ref 0–1)
BILIRUB SERPL-MCNC: 0.53 MG/DL (ref 0.2–1)
BUN SERPL-MCNC: 16 MG/DL (ref 5–25)
BURR CELLS BLD QL SMEAR: PRESENT
CALCIUM ALBUM COR SERPL-MCNC: 10 MG/DL (ref 8.3–10.1)
CALCIUM SERPL-MCNC: 9.3 MG/DL (ref 8.4–10.2)
CHLORIDE SERPL-SCNC: 106 MMOL/L (ref 96–108)
CO2 SERPL-SCNC: 23 MMOL/L (ref 21–32)
CREAT SERPL-MCNC: 1.06 MG/DL (ref 0.6–1.3)
EOSINOPHIL # BLD MANUAL: 0.07 THOUSAND/UL (ref 0–0.4)
EOSINOPHIL NFR BLD MANUAL: 1 % (ref 0–6)
ERYTHROCYTE [DISTWIDTH] IN BLOOD BY AUTOMATED COUNT: 16.4 % (ref 11.6–15.1)
GFR SERPL CREATININE-BSD FRML MDRD: 54 ML/MIN/1.73SQ M
GLUCOSE SERPL-MCNC: 95 MG/DL (ref 65–140)
HCT VFR BLD AUTO: 25.9 % (ref 34.8–46.1)
HGB BLD-MCNC: 8.4 G/DL (ref 11.5–15.4)
LYMPHOCYTES # BLD AUTO: 0.7 THOUSAND/UL (ref 0.6–4.47)
LYMPHOCYTES # BLD AUTO: 9 % (ref 14–44)
MAGNESIUM SERPL-MCNC: 1.9 MG/DL (ref 1.9–2.7)
MCH RBC QN AUTO: 30.7 PG (ref 26.8–34.3)
MCHC RBC AUTO-ENTMCNC: 32.4 G/DL (ref 31.4–37.4)
MCV RBC AUTO: 95 FL (ref 82–98)
METAMYELOCYTE ABSOLUTE CT: 0.28 THOUSAND/UL (ref 0–0.1)
METAMYELOCYTES NFR BLD MANUAL: 4 % (ref 0–1)
MONOCYTES # BLD AUTO: 0.56 THOUSAND/UL (ref 0–1.22)
MONOCYTES NFR BLD: 8 % (ref 4–12)
MYELOCYTE ABSOLUTE CT: 0.14 THOUSAND/UL (ref 0–0.1)
MYELOCYTES NFR BLD MANUAL: 2 % (ref 0–1)
NEUTROPHILS # BLD MANUAL: 5.24 THOUSAND/UL (ref 1.85–7.62)
NEUTS BAND NFR BLD MANUAL: 4 % (ref 0–8)
NEUTS SEG NFR BLD AUTO: 71 % (ref 43–75)
PLATELET # BLD AUTO: 218 THOUSANDS/UL (ref 149–390)
PLATELET BLD QL SMEAR: ADEQUATE
PMV BLD AUTO: 10.2 FL (ref 8.9–12.7)
POTASSIUM SERPL-SCNC: 3.7 MMOL/L (ref 3.5–5.3)
PROT SERPL-MCNC: 6.7 G/DL (ref 6.4–8.4)
RBC # BLD AUTO: 2.74 MILLION/UL (ref 3.81–5.12)
RBC MORPH BLD: PRESENT
SMUDGE CELLS BLD QL SMEAR: PRESENT
SODIUM SERPL-SCNC: 137 MMOL/L (ref 135–147)
VARIANT LYMPHS # BLD AUTO: 1 %
WBC # BLD AUTO: 6.99 THOUSAND/UL (ref 4.31–10.16)

## 2025-02-07 PROCEDURE — 85027 COMPLETE CBC AUTOMATED: CPT | Performed by: INTERNAL MEDICINE

## 2025-02-07 PROCEDURE — 96365 THER/PROPH/DIAG IV INF INIT: CPT

## 2025-02-07 PROCEDURE — 83735 ASSAY OF MAGNESIUM: CPT | Performed by: INTERNAL MEDICINE

## 2025-02-07 PROCEDURE — 85007 BL SMEAR W/DIFF WBC COUNT: CPT | Performed by: INTERNAL MEDICINE

## 2025-02-07 PROCEDURE — 80053 COMPREHEN METABOLIC PANEL: CPT | Performed by: INTERNAL MEDICINE

## 2025-02-07 RX ADMIN — MAGNESIUM SULFATE HEPTAHYDRATE: 500 INJECTION, SOLUTION INTRAMUSCULAR; INTRAVENOUS at 12:54

## 2025-02-07 NOTE — PROGRESS NOTES
Pt arrived to unit without complaint, labs drawn and IV hydration with Mg administered as ordered. Pt's most recent serum Mg =1.7. Pt left unit in stable condition, declines AVS, aware of next appt 2/10 1300

## 2025-02-10 ENCOUNTER — HOSPITAL ENCOUNTER (OUTPATIENT)
Dept: INFUSION CENTER | Facility: CLINIC | Age: 70
Discharge: HOME | End: 2025-02-10
Payer: MEDICARE

## 2025-02-10 VITALS
TEMPERATURE: 97.6 F | RESPIRATION RATE: 18 BRPM | DIASTOLIC BLOOD PRESSURE: 84 MMHG | SYSTOLIC BLOOD PRESSURE: 124 MMHG | HEART RATE: 112 BPM

## 2025-02-10 DIAGNOSIS — Z17.0 MALIGNANT NEOPLASM OF UPPER-OUTER QUADRANT OF RIGHT BREAST IN FEMALE, ESTROGEN RECEPTOR POSITIVE (HCC): Primary | ICD-10-CM

## 2025-02-10 DIAGNOSIS — C50.411 MALIGNANT NEOPLASM OF UPPER-OUTER QUADRANT OF RIGHT BREAST IN FEMALE, ESTROGEN RECEPTOR POSITIVE (HCC): Primary | ICD-10-CM

## 2025-02-10 LAB
ALBUMIN SERPL BCG-MCNC: 3.2 G/DL (ref 3.5–5)
ALP SERPL-CCNC: 224 U/L (ref 34–104)
ALT SERPL W P-5'-P-CCNC: 12 U/L (ref 7–52)
ANION GAP SERPL CALCULATED.3IONS-SCNC: 9 MMOL/L (ref 4–13)
AST SERPL W P-5'-P-CCNC: 31 U/L (ref 13–39)
BILIRUB SERPL-MCNC: 0.35 MG/DL (ref 0.2–1)
BUN SERPL-MCNC: 19 MG/DL (ref 5–25)
CALCIUM ALBUM COR SERPL-MCNC: 10.6 MG/DL (ref 8.3–10.1)
CALCIUM SERPL-MCNC: 10 MG/DL (ref 8.4–10.2)
CHLORIDE SERPL-SCNC: 108 MMOL/L (ref 96–108)
CO2 SERPL-SCNC: 21 MMOL/L (ref 21–32)
CREAT SERPL-MCNC: 0.99 MG/DL (ref 0.6–1.3)
ERYTHROCYTE [DISTWIDTH] IN BLOOD BY AUTOMATED COUNT: 17 % (ref 11.6–15.1)
GFR SERPL CREATININE-BSD FRML MDRD: 58 ML/MIN/1.73SQ M
GLUCOSE SERPL-MCNC: 130 MG/DL (ref 65–140)
HCT VFR BLD AUTO: 26.8 % (ref 34.8–46.1)
HGB BLD-MCNC: 8.6 G/DL (ref 11.5–15.4)
MAGNESIUM SERPL-MCNC: 1.6 MG/DL (ref 1.9–2.7)
MCH RBC QN AUTO: 30.4 PG (ref 26.8–34.3)
MCHC RBC AUTO-ENTMCNC: 32.1 G/DL (ref 31.4–37.4)
MCV RBC AUTO: 95 FL (ref 82–98)
PLATELET # BLD AUTO: 224 THOUSANDS/UL (ref 149–390)
PMV BLD AUTO: 10.9 FL (ref 8.9–12.7)
POTASSIUM SERPL-SCNC: 4.3 MMOL/L (ref 3.5–5.3)
PROT SERPL-MCNC: 7 G/DL (ref 6.4–8.4)
RBC # BLD AUTO: 2.83 MILLION/UL (ref 3.81–5.12)
SODIUM SERPL-SCNC: 138 MMOL/L (ref 135–147)
WBC # BLD AUTO: 56.73 THOUSAND/UL (ref 4.31–10.16)

## 2025-02-10 PROCEDURE — 83735 ASSAY OF MAGNESIUM: CPT | Performed by: INTERNAL MEDICINE

## 2025-02-10 PROCEDURE — 85027 COMPLETE CBC AUTOMATED: CPT | Performed by: INTERNAL MEDICINE

## 2025-02-10 PROCEDURE — 80053 COMPREHEN METABOLIC PANEL: CPT | Performed by: INTERNAL MEDICINE

## 2025-02-10 PROCEDURE — 96365 THER/PROPH/DIAG IV INF INIT: CPT

## 2025-02-10 PROCEDURE — 85007 BL SMEAR W/DIFF WBC COUNT: CPT | Performed by: INTERNAL MEDICINE

## 2025-02-10 RX ADMIN — MAGNESIUM SULFATE HEPTAHYDRATE: 500 INJECTION, SOLUTION INTRAMUSCULAR; INTRAVENOUS at 13:47

## 2025-02-10 NOTE — PROGRESS NOTES
Patient arrived to unit without complaint. Patient tolerated IV hydration with Magnesium without incident. AVS provided and patient aware of next appointment on 2/12/25 at 13:00. Patient left in stable condition.

## 2025-02-10 NOTE — PROGRESS NOTES
"Daily Speech Treatment Note    Today's date: 2025  Patient’s name: Maira Moura  : 1955  MRN: [id number]  Safety measures: HTN, CVA, GERD, active CA, s/p PEG tube placement, aspiration precautions  Current recommended diet: PEG tube for primary nutrition/hydration; puree with thin liquids P.O. as tolerated  Referring provider: Harika Medina DO Encounter Diagnosis     ICD-10-CM    1. Dysphagia, oropharyngeal phase  R13.12       2. Status post tonsillectomy  Z90.89       3. 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    Objective/Assessment:    Short-term goals:  Patient will receive a videofluoroscopic swallow study (VFSS) to assess her current swallowing function to r/o penetration or aspiration, to determine the safest, least restrictive diet, and to recommended the appropriate rehabilitation exercises (to be achieved in 2-3 weeks).     Patient will be educated on safe swallowing compensatory strategies (e.g., upright posture, small bites/sips, slow rate, alternation of consistencies, multiple swallows, effortful swallow, etc.) to facilitate increased safety with P.O. intake (to be achieved in 4-6 weeks).     Patient will tolerate P.O. trials of her recommended diet with the implementation of safe swallowing strategies without overt s/sx of penetration and/or aspiration during skilled therapy sessions in this certification period (to be achieved in 4-6 weeks).      Traditional " "VitalStim     Channel(s) used: 1, 2   Placement: 3a   Duty Cycle: 57/1 s   Frequency: 80 pulses per second   Phase Duration: 300 microseconds   Treatment Duration: 33 minutes   Min mA level: 8.0 mA   Max mA level: 14.0 mA      Patient tolerated NMES in conjunction with pharyngeal/laryngeal strengthening exercises and P.O. intake. (The patient received instruction on the potential benefits from NMES treatment, including neuromuscular re-education and muscle strengthening.) Skin condition post-NMES: intact.      Patient consumed the following during today’s session: chocolate pudding and water (thin liquid) via side of cup -- NMES remained on during pharyngeal strengthening exercises (see below). Patient expressed that she didn't wish to drink NTL today as \"it didn't feel right in [her] mouth\".    Patient did quite well with trials today. Patient without any c/o odynophagia. Patient indicated that she remembered to take her pain medication today.    Oral containment and bolus formation/control were judged to be WFL. Increased time for AP transfer. Swallow initiation appeared to be delayed. Reduced hyolaryngeal elevation and excursion noted upon palpation. No oral cavity residue was observed. Patient did not report any c/o globus sensation. Throat clear x4 and cough x2 noted during session trials. Vocal quality returned to baseline status. No other overt s/sx of penetration/aspiration noted during today's session. Patient implemented small bites and slow rate with P.O. intake, as well as liquid washes as needed.     Patient will perform oral motor exercises using IOPI device on lingual muscles to facilitate increased function and strength by 25% for improved swallowing function (to be achieved in 6-8 weeks).    Patient will independently complete pharyngeal strengthening exercises (e.g., Effortful swallow, Mendelsohn, Evelia) daily to facilitate increased pharyngeal strength and coordination (to be achieved in 4-6 " weeks).   -Patient completed the following pharyngeal strengthening exercises during today's session:  *Effortful swallow: 2 sets of 10 reps  *Mendelsohn: 2 sets of 10 reps  *Evelia: 2 sets of 10 reps    Plan:  -Patient was provided with home exercises/activities to target goals in plan of care at the end of today's session.  -Continue with current plan of care.

## 2025-02-11 ENCOUNTER — OFFICE VISIT (OUTPATIENT)
Dept: SPEECH THERAPY | Facility: CLINIC | Age: 70
End: 2025-02-11
Payer: MEDICARE

## 2025-02-11 DIAGNOSIS — R13.12 DYSPHAGIA, OROPHARYNGEAL PHASE: Primary | ICD-10-CM

## 2025-02-11 DIAGNOSIS — C09.9 RIGHT TONSILLAR SQUAMOUS CELL CARCINOMA (HCC): ICD-10-CM

## 2025-02-11 DIAGNOSIS — Z90.89 STATUS POST TONSILLECTOMY: ICD-10-CM

## 2025-02-11 LAB
ACANTHOCYTES BLD QL SMEAR: PRESENT
ANISOCYTOSIS BLD QL SMEAR: PRESENT
BASOPHILS # BLD MANUAL: 0 THOUSAND/UL (ref 0–0.1)
BASOPHILS NFR MAR MANUAL: 0 % (ref 0–1)
BURR CELLS BLD QL SMEAR: PRESENT
EOSINOPHIL # BLD MANUAL: 0 THOUSAND/UL (ref 0–0.4)
EOSINOPHIL NFR BLD MANUAL: 0 % (ref 0–6)
HELMET CELLS BLD QL SMEAR: PRESENT
LYMPHOCYTES # BLD AUTO: 2.27 THOUSAND/UL (ref 0.6–4.47)
LYMPHOCYTES # BLD AUTO: 4 % (ref 14–44)
METAMYELOCYTE ABSOLUTE CT: 0.57 THOUSAND/UL (ref 0–0.1)
METAMYELOCYTES NFR BLD MANUAL: 1 % (ref 0–1)
MONOCYTES # BLD AUTO: 0.57 THOUSAND/UL (ref 0–1.22)
MONOCYTES NFR BLD: 1 % (ref 4–12)
NEUTROPHILS # BLD MANUAL: 53.33 THOUSAND/UL (ref 1.85–7.62)
NEUTS BAND NFR BLD MANUAL: 4 % (ref 0–8)
NEUTS SEG NFR BLD AUTO: 90 % (ref 43–75)
OVALOCYTES BLD QL SMEAR: PRESENT
PLATELET BLD QL SMEAR: ADEQUATE
RBC MORPH BLD: PRESENT
TOXIC GRANULES BLD QL SMEAR: PRESENT

## 2025-02-11 PROCEDURE — 92526 ORAL FUNCTION THERAPY: CPT

## 2025-02-11 NOTE — PROGRESS NOTES
Daily Speech Treatment Note    Today's date: 2025  Patient’s name: Maira Moura  : 1955  MRN: [id number]  Safety measures: HTN, CVA, GERD, active CA, s/p PEG tube placement, aspiration precautions  Current recommended diet: PEG tube for nutrition/hydration/medications  Referring provider: Harika Medina DO    Encounter Diagnosis     ICD-10-CM    1. Dysphagia, oropharyngeal phase  R13.12       2. Status post tonsillectomy  Z90.89       3. 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 She had small bites of yogurt and some water. Reports most difficulty with water.     Nystatin started last week   Swish and spit      Objective/Assessment:      Short-term goals:  Patient will receive a videofluoroscopic swallow study (VFSS) to assess her current swallowing function to r/o penetration or aspiration, to determine the safest, least restrictive diet, and to recommended the appropriate rehabilitation exercises (to be achieved in 2-3 weeks).     Patient will be educated on safe swallowing compensatory strategies (e.g., upright posture, small bites/sips, slow rate, alternation of consistencies, multiple swallows, effortful swallow, etc.) to facilitate increased safety with P.O. intake (to be achieved in 4-6 weeks).     Patient will tolerate P.O. trials of her recommended diet with the implementation of safe swallowing strategies without overt s/sx of penetration and/or aspiration during skilled therapy sessions in this certification period (to be achieved in 4-6 weeks).     Traditional VitalStim     Channel(s) used: 1, 2   Placement: 3a  "  Duty Cycle: 57/1 s   Frequency: 80 pulses per second   Phase Duration: 300 microseconds   Treatment Duration: 25 minutes   Min mA level: 5.0 mA   Max mA level: 9.0 mA      Patient tolerated NMES in conjunction with pharyngeal/laryngeal strengthening exercises and P.O. intake. (The patient received instruction on the potential benefits from NMES treatment, including neuromuscular re-education and muscle strengthening.) Skin condition post-NMES: intact.      Patient consumed the following during today’s session: Patient expressed some fears of trying regular water today and we discussed trials of NTL (water) - patient reports she liked this consistency -- NMES remained on during pharyngeal strengthening exercises (see below).    In total, patient tolerated approx 6 sips of NTL throughout today's session  At the end of the session; after NMES was turned off, patient requested some sips of regular water. Tolerated 3 sips, no over distress noted.     Patient provided with sample packets of \"SIMPLY THICK\" to have on hand if she were to prefer NTL over thin water     Oral containment was WFL. Increased time required for bolus formation/control and AP transfer. Swallow initiation appeared to be delayed. Reduced hyolaryngeal elevation and excursion noted upon palpation. Patient implemented small sips and slow rate with liquid PO. Patient with NO symptoms of throat clearing and coughing on P.O. trials.     Patient will perform oral motor exercises using IOPI device on lingual muscles to facilitate increased function and strength by 25% for improved swallowing function (to be achieved in 6-8 weeks).     Patient will independently complete pharyngeal strengthening exercises (e.g., Effortful swallow, Mendelsohn, Evelia) daily to facilitate increased pharyngeal strength and coordination (to be achieved in 4-6 weeks).   -Patient completed the following pharyngeal strengthening exercises during today's session:  *Effortful " swallow: 1 set of 10 reps  *Mendelsohn: 1 sets of 10 reps  *Evelia: 1 set of 10 reps     Plan:  -Patient was provided with home exercises/activities to target goals in plan of care at the end of today's session.  -Continue with current plan of care.

## 2025-02-12 ENCOUNTER — HOSPITAL ENCOUNTER (OUTPATIENT)
Dept: INFUSION CENTER | Facility: CLINIC | Age: 70
Discharge: HOME | End: 2025-02-12
Payer: MEDICARE

## 2025-02-12 VITALS
TEMPERATURE: 97.9 F | DIASTOLIC BLOOD PRESSURE: 82 MMHG | SYSTOLIC BLOOD PRESSURE: 132 MMHG | HEART RATE: 109 BPM | RESPIRATION RATE: 16 BRPM

## 2025-02-12 DIAGNOSIS — Z17.0 MALIGNANT NEOPLASM OF UPPER-OUTER QUADRANT OF RIGHT BREAST IN FEMALE, ESTROGEN RECEPTOR POSITIVE (HCC): Primary | ICD-10-CM

## 2025-02-12 DIAGNOSIS — C50.411 MALIGNANT NEOPLASM OF UPPER-OUTER QUADRANT OF RIGHT BREAST IN FEMALE, ESTROGEN RECEPTOR POSITIVE (HCC): Primary | ICD-10-CM

## 2025-02-12 LAB
ALBUMIN SERPL BCG-MCNC: 3 G/DL (ref 3.5–5)
ALP SERPL-CCNC: 174 U/L (ref 34–104)
ALT SERPL W P-5'-P-CCNC: 10 U/L (ref 7–52)
ANION GAP SERPL CALCULATED.3IONS-SCNC: 6 MMOL/L (ref 4–13)
ANISOCYTOSIS BLD QL SMEAR: PRESENT
AST SERPL W P-5'-P-CCNC: 28 U/L (ref 13–39)
BASOPHILS # BLD MANUAL: 0 THOUSAND/UL (ref 0–0.1)
BASOPHILS NFR MAR MANUAL: 0 % (ref 0–1)
BILIRUB SERPL-MCNC: 0.32 MG/DL (ref 0.2–1)
BUN SERPL-MCNC: 17 MG/DL (ref 5–25)
CALCIUM ALBUM COR SERPL-MCNC: 10.4 MG/DL (ref 8.3–10.1)
CALCIUM SERPL-MCNC: 9.6 MG/DL (ref 8.4–10.2)
CHLORIDE SERPL-SCNC: 105 MMOL/L (ref 96–108)
CO2 SERPL-SCNC: 24 MMOL/L (ref 21–32)
CREAT SERPL-MCNC: 0.97 MG/DL (ref 0.6–1.3)
EOSINOPHIL # BLD MANUAL: 0 THOUSAND/UL (ref 0–0.4)
EOSINOPHIL NFR BLD MANUAL: 0 % (ref 0–6)
ERYTHROCYTE [DISTWIDTH] IN BLOOD BY AUTOMATED COUNT: 17.4 % (ref 11.6–15.1)
GFR SERPL CREATININE-BSD FRML MDRD: 60 ML/MIN/1.73SQ M
GLUCOSE SERPL-MCNC: 97 MG/DL (ref 65–140)
HCT VFR BLD AUTO: 26.4 % (ref 34.8–46.1)
HGB BLD-MCNC: 8.6 G/DL (ref 11.5–15.4)
LYMPHOCYTES # BLD AUTO: 1.09 THOUSAND/UL (ref 0.6–4.47)
LYMPHOCYTES # BLD AUTO: 3 % (ref 14–44)
MAGNESIUM SERPL-MCNC: 1.6 MG/DL (ref 1.9–2.7)
MCH RBC QN AUTO: 30.2 PG (ref 26.8–34.3)
MCHC RBC AUTO-ENTMCNC: 32.6 G/DL (ref 31.4–37.4)
MCV RBC AUTO: 93 FL (ref 82–98)
MONOCYTES # BLD AUTO: 1.64 THOUSAND/UL (ref 0–1.22)
MONOCYTES NFR BLD: 6 % (ref 4–12)
MYELOCYTE ABSOLUTE CT: 0.27 THOUSAND/UL (ref 0–0.1)
MYELOCYTES NFR BLD MANUAL: 1 % (ref 0–1)
NEUTROPHILS # BLD MANUAL: 24.29 THOUSAND/UL (ref 1.85–7.62)
NEUTS BAND NFR BLD MANUAL: 1 % (ref 0–8)
NEUTS SEG NFR BLD AUTO: 88 % (ref 43–75)
PLATELET # BLD AUTO: 160 THOUSANDS/UL (ref 149–390)
PLATELET BLD QL SMEAR: ADEQUATE
PMV BLD AUTO: 11.1 FL (ref 8.9–12.7)
POTASSIUM SERPL-SCNC: 3.9 MMOL/L (ref 3.5–5.3)
PROT SERPL-MCNC: 6.7 G/DL (ref 6.4–8.4)
RBC # BLD AUTO: 2.85 MILLION/UL (ref 3.81–5.12)
RBC MORPH BLD: PRESENT
SODIUM SERPL-SCNC: 135 MMOL/L (ref 135–147)
VARIANT LYMPHS # BLD AUTO: 1 %
WBC # BLD AUTO: 27.29 THOUSAND/UL (ref 4.31–10.16)

## 2025-02-12 PROCEDURE — 85027 COMPLETE CBC AUTOMATED: CPT | Performed by: INTERNAL MEDICINE

## 2025-02-12 PROCEDURE — 85007 BL SMEAR W/DIFF WBC COUNT: CPT | Performed by: INTERNAL MEDICINE

## 2025-02-12 PROCEDURE — 83735 ASSAY OF MAGNESIUM: CPT | Performed by: INTERNAL MEDICINE

## 2025-02-12 PROCEDURE — 96365 THER/PROPH/DIAG IV INF INIT: CPT

## 2025-02-12 PROCEDURE — 80053 COMPREHEN METABOLIC PANEL: CPT | Performed by: INTERNAL MEDICINE

## 2025-02-12 RX ADMIN — MAGNESIUM SULFATE HEPTAHYDRATE: 500 INJECTION, SOLUTION INTRAMUSCULAR; INTRAVENOUS at 13:32

## 2025-02-12 NOTE — PROGRESS NOTES
Labs obtained via port a cath.  Magnesium 1.6  Pt. Tolerated 1000 ml with Magnesium 2 gm without adverse event.  Pt. Will return

## 2025-02-12 NOTE — PLAN OF CARE
Problem: INFECTION - ADULT  Goal: Absence or prevention of progression during hospitalization  Description: INTERVENTIONS:  - Assess and monitor for signs and symptoms of infection  - Monitor lab/diagnostic results  - Monitor all insertion sites, i.e. indwelling lines, tubes, and drains  - Monitor endotracheal if appropriate and nasal secretions for changes in amount and color  - [place] appropriate cooling/warming therapies per order  - Administer medications as ordered  - Instruct and encourage patient and family to use good hand hygiene technique  - Identify and instruct in appropriate isolation precautions for identified infection/condition  Outcome: Progressing  Goal: Absence of fever/infection during neutropenic period  Description: INTERVENTIONS:  - Monitor WBC    Outcome: Progressing     Problem: Knowledge Deficit  Goal: Patient/family/caregiver demonstrates understanding of disease process, treatment plan, medications, and discharge instructions  Description: Complete learning assessment and assess knowledge base.  Interventions:  - Provide teaching at level of understanding  - Provide teaching via preferred learning methods  Outcome: Progressing

## 2025-02-12 NOTE — PROGRESS NOTES
Maira Moura  tolerated treatment well with no complications.      Maira Moura is aware of future appt on 2/14/2025 at 1200.     AVS printed and given to Maira Moura:  Yes

## 2025-02-12 NOTE — PLAN OF CARE
Problem: INFECTION - ADULT  Goal: Absence or prevention of progression during hospitalization  Description: INTERVENTIONS:  - Assess and monitor for signs and symptoms of infection  - Monitor lab/diagnostic results  - Monitor all insertion sites, i.e. indwelling lines, tubes, and drains  - Monitor endotracheal if appropriate and nasal secretions for changes in amount and color  - [place] appropriate cooling/warming therapies per order  - Administer medications as ordered  - Instruct and encourage patient and family to use good hand hygiene technique  - Identify and instruct in appropriate isolation precautions for identified infection/condition  2/12/2025 1327 by Enedina Brown RN  Outcome: Progressing  2/12/2025 1327 by Enedina Brown RN  Outcome: Progressing  Goal: Absence of fever/infection during neutropenic period  Description: INTERVENTIONS:  - Monitor WBC    2/12/2025 1327 by Enedina Brown RN  Outcome: Progressing  2/12/2025 1327 by Enedina Brown RN  Outcome: Progressing     Problem: Knowledge Deficit  Goal: Patient/family/caregiver demonstrates understanding of disease process, treatment plan, medications, and discharge instructions  Description: Complete learning assessment and assess knowledge base.  Interventions:  - Provide teaching at level of understanding  - Provide teaching via preferred learning methods  2/12/2025 1327 by Enedina Brown RN  Outcome: Progressing  2/12/2025 1327 by Enedina Brown RN  Outcome: Progressing

## 2025-02-13 ENCOUNTER — OFFICE VISIT (OUTPATIENT)
Dept: SPEECH THERAPY | Facility: CLINIC | Age: 70
End: 2025-02-13
Payer: MEDICARE

## 2025-02-13 DIAGNOSIS — Z90.89 STATUS POST TONSILLECTOMY: ICD-10-CM

## 2025-02-13 DIAGNOSIS — C09.9 RIGHT TONSILLAR SQUAMOUS CELL CARCINOMA (HCC): ICD-10-CM

## 2025-02-13 DIAGNOSIS — R13.12 DYSPHAGIA, OROPHARYNGEAL PHASE: Primary | ICD-10-CM

## 2025-02-13 PROCEDURE — 92526 ORAL FUNCTION THERAPY: CPT | Performed by: SPEECH-LANGUAGE PATHOLOGIST

## 2025-02-14 ENCOUNTER — HOSPITAL ENCOUNTER (OUTPATIENT)
Dept: INFUSION CENTER | Facility: CLINIC | Age: 70
End: 2025-02-14
Payer: MEDICARE

## 2025-02-14 VITALS
DIASTOLIC BLOOD PRESSURE: 75 MMHG | SYSTOLIC BLOOD PRESSURE: 112 MMHG | HEART RATE: 90 BPM | TEMPERATURE: 98 F | RESPIRATION RATE: 18 BRPM

## 2025-02-14 DIAGNOSIS — Z17.0 MALIGNANT NEOPLASM OF UPPER-OUTER QUADRANT OF RIGHT BREAST IN FEMALE, ESTROGEN RECEPTOR POSITIVE (HCC): Primary | ICD-10-CM

## 2025-02-14 DIAGNOSIS — C50.411 MALIGNANT NEOPLASM OF UPPER-OUTER QUADRANT OF RIGHT BREAST IN FEMALE, ESTROGEN RECEPTOR POSITIVE (HCC): Primary | ICD-10-CM

## 2025-02-14 LAB
ALBUMIN SERPL BCG-MCNC: 3 G/DL (ref 3.5–5)
ALP SERPL-CCNC: 173 U/L (ref 34–104)
ALT SERPL W P-5'-P-CCNC: 12 U/L (ref 7–52)
ANION GAP SERPL CALCULATED.3IONS-SCNC: 8 MMOL/L (ref 4–13)
AST SERPL W P-5'-P-CCNC: 39 U/L (ref 13–39)
BASOPHILS # BLD AUTO: 0.1 THOUSANDS/ÂΜL (ref 0–0.1)
BASOPHILS NFR BLD AUTO: 0 % (ref 0–1)
BILIRUB SERPL-MCNC: 0.39 MG/DL (ref 0.2–1)
BUN SERPL-MCNC: 20 MG/DL (ref 5–25)
CALCIUM ALBUM COR SERPL-MCNC: 10 MG/DL (ref 8.3–10.1)
CALCIUM SERPL-MCNC: 9.2 MG/DL (ref 8.4–10.2)
CHLORIDE SERPL-SCNC: 104 MMOL/L (ref 96–108)
CO2 SERPL-SCNC: 24 MMOL/L (ref 21–32)
CREAT SERPL-MCNC: 1.02 MG/DL (ref 0.6–1.3)
EOSINOPHIL # BLD AUTO: 0.04 THOUSAND/ÂΜL (ref 0–0.61)
EOSINOPHIL NFR BLD AUTO: 0 % (ref 0–6)
ERYTHROCYTE [DISTWIDTH] IN BLOOD BY AUTOMATED COUNT: 17.2 % (ref 11.6–15.1)
GFR SERPL CREATININE-BSD FRML MDRD: 56 ML/MIN/1.73SQ M
GLUCOSE SERPL-MCNC: 119 MG/DL (ref 65–140)
HCT VFR BLD AUTO: 25.9 % (ref 34.8–46.1)
HGB BLD-MCNC: 8.2 G/DL (ref 11.5–15.4)
IMM GRANULOCYTES # BLD AUTO: >0.5 THOUSAND/UL (ref 0–0.2)
IMM GRANULOCYTES NFR BLD AUTO: 4 % (ref 0–2)
LYMPHOCYTES # BLD AUTO: 1.67 THOUSANDS/ÂΜL (ref 0.6–4.47)
LYMPHOCYTES NFR BLD AUTO: 7 % (ref 14–44)
MAGNESIUM SERPL-MCNC: 1.9 MG/DL (ref 1.9–2.7)
MCH RBC QN AUTO: 29.6 PG (ref 26.8–34.3)
MCHC RBC AUTO-ENTMCNC: 31.7 G/DL (ref 31.4–37.4)
MCV RBC AUTO: 94 FL (ref 82–98)
MONOCYTES # BLD AUTO: 2.43 THOUSAND/ÂΜL (ref 0.17–1.22)
MONOCYTES NFR BLD AUTO: 10 % (ref 4–12)
NEUTROPHILS # BLD AUTO: 19.14 THOUSANDS/ÂΜL (ref 1.85–7.62)
NEUTS SEG NFR BLD AUTO: 79 % (ref 43–75)
NRBC BLD AUTO-RTO: 0 /100 WBCS
PLATELET # BLD AUTO: 156 THOUSANDS/UL (ref 149–390)
PMV BLD AUTO: 10.8 FL (ref 8.9–12.7)
POTASSIUM SERPL-SCNC: 4.3 MMOL/L (ref 3.5–5.3)
PROT SERPL-MCNC: 6.9 G/DL (ref 6.4–8.4)
RBC # BLD AUTO: 2.77 MILLION/UL (ref 3.81–5.12)
SODIUM SERPL-SCNC: 136 MMOL/L (ref 135–147)
WBC # BLD AUTO: 24.24 THOUSAND/UL (ref 4.31–10.16)

## 2025-02-14 PROCEDURE — 83735 ASSAY OF MAGNESIUM: CPT | Performed by: INTERNAL MEDICINE

## 2025-02-14 PROCEDURE — 85025 COMPLETE CBC W/AUTO DIFF WBC: CPT | Performed by: INTERNAL MEDICINE

## 2025-02-14 PROCEDURE — 96365 THER/PROPH/DIAG IV INF INIT: CPT

## 2025-02-14 PROCEDURE — 80053 COMPREHEN METABOLIC PANEL: CPT | Performed by: INTERNAL MEDICINE

## 2025-02-14 RX ORDER — FLUOROURACIL 50 MG/ML
400 INJECTION, SOLUTION INTRAVENOUS ONCE
Status: CANCELLED | OUTPATIENT
Start: 2025-02-19

## 2025-02-14 RX ORDER — ATROPINE SULFATE 1 MG/ML
0.25 INJECTION, SOLUTION INTRAVENOUS ONCE
OUTPATIENT
Start: 2025-03-05

## 2025-02-14 RX ORDER — ATROPINE SULFATE 1 MG/ML
0.25 INJECTION, SOLUTION INTRAVENOUS ONCE AS NEEDED
OUTPATIENT
Start: 2025-03-05

## 2025-02-14 RX ORDER — SODIUM CHLORIDE 9 MG/ML
20 INJECTION, SOLUTION INTRAVENOUS ONCE
OUTPATIENT
Start: 2025-03-05

## 2025-02-14 RX ADMIN — MAGNESIUM SULFATE HEPTAHYDRATE: 500 INJECTION, SOLUTION INTRAMUSCULAR; INTRAVENOUS at 12:16

## 2025-02-14 NOTE — PLAN OF CARE
Problem: INFECTION - ADULT  Goal: Absence or prevention of progression during hospitalization  Description: INTERVENTIONS:  - Assess and monitor for signs and symptoms of infection  - Monitor lab/diagnostic results  - Monitor all insertion sites, i.e. indwelling lines, tubes, and drains  - Monitor endotracheal if appropriate and nasal secretions for changes in amount and color  - [place] appropriate cooling/warming therapies per order  - Administer medications as ordered  - Instruct and encourage patient and family to use good hand hygiene technique  - Identify and instruct in appropriate isolation precautions for identified infection/condition  Outcome: Progressing

## 2025-02-14 NOTE — PROGRESS NOTES
Patient tolerated her Magnesium and hydration well without adverse reaction. She is aware to return on 2/17/25.

## 2025-02-17 ENCOUNTER — HOSPITAL ENCOUNTER (OUTPATIENT)
Dept: INFUSION CENTER | Facility: CLINIC | Age: 70
Discharge: HOME | End: 2025-02-17
Payer: MEDICARE

## 2025-02-17 VITALS
TEMPERATURE: 96.1 F | RESPIRATION RATE: 18 BRPM | DIASTOLIC BLOOD PRESSURE: 76 MMHG | HEART RATE: 108 BPM | SYSTOLIC BLOOD PRESSURE: 109 MMHG

## 2025-02-17 DIAGNOSIS — C18.9 METASTATIC COLON CANCER TO LIVER (HCC): Primary | ICD-10-CM

## 2025-02-17 DIAGNOSIS — C50.411 MALIGNANT NEOPLASM OF UPPER-OUTER QUADRANT OF RIGHT BREAST IN FEMALE, ESTROGEN RECEPTOR POSITIVE (HCC): Primary | ICD-10-CM

## 2025-02-17 DIAGNOSIS — Z17.0 MALIGNANT NEOPLASM OF UPPER-OUTER QUADRANT OF RIGHT BREAST IN FEMALE, ESTROGEN RECEPTOR POSITIVE (HCC): Primary | ICD-10-CM

## 2025-02-17 DIAGNOSIS — C78.7 METASTATIC COLON CANCER TO LIVER (HCC): Primary | ICD-10-CM

## 2025-02-17 LAB
ALBUMIN SERPL BCG-MCNC: 3.2 G/DL (ref 3.5–5)
ALP SERPL-CCNC: 141 U/L (ref 34–104)
ALT SERPL W P-5'-P-CCNC: 11 U/L (ref 7–52)
ANION GAP SERPL CALCULATED.3IONS-SCNC: 6 MMOL/L (ref 4–13)
AST SERPL W P-5'-P-CCNC: 29 U/L (ref 13–39)
BASOPHILS # BLD AUTO: 0.04 THOUSANDS/ΜL (ref 0–0.1)
BASOPHILS NFR BLD AUTO: 0 % (ref 0–1)
BILIRUB SERPL-MCNC: 0.39 MG/DL (ref 0.2–1)
BUN SERPL-MCNC: 16 MG/DL (ref 5–25)
CALCIUM ALBUM COR SERPL-MCNC: 9.8 MG/DL (ref 8.3–10.1)
CALCIUM SERPL-MCNC: 9.2 MG/DL (ref 8.4–10.2)
CHLORIDE SERPL-SCNC: 99 MMOL/L (ref 96–108)
CO2 SERPL-SCNC: 28 MMOL/L (ref 21–32)
CREAT SERPL-MCNC: 1.02 MG/DL (ref 0.6–1.3)
EOSINOPHIL # BLD AUTO: 0.02 THOUSAND/ΜL (ref 0–0.61)
EOSINOPHIL NFR BLD AUTO: 0 % (ref 0–6)
ERYTHROCYTE [DISTWIDTH] IN BLOOD BY AUTOMATED COUNT: 16.6 % (ref 11.6–15.1)
GFR SERPL CREATININE-BSD FRML MDRD: 56 ML/MIN/1.73SQ M
GLUCOSE SERPL-MCNC: 107 MG/DL (ref 65–140)
HCT VFR BLD AUTO: 25.3 % (ref 34.8–46.1)
HGB BLD-MCNC: 8.2 G/DL (ref 11.5–15.4)
IMM GRANULOCYTES # BLD AUTO: 0.11 THOUSAND/UL (ref 0–0.2)
IMM GRANULOCYTES NFR BLD AUTO: 1 % (ref 0–2)
LYMPHOCYTES # BLD AUTO: 0.78 THOUSANDS/ΜL (ref 0.6–4.47)
LYMPHOCYTES NFR BLD AUTO: 7 % (ref 14–44)
MAGNESIUM SERPL-MCNC: 1.8 MG/DL (ref 1.9–2.7)
MCH RBC QN AUTO: 30.3 PG (ref 26.8–34.3)
MCHC RBC AUTO-ENTMCNC: 32.4 G/DL (ref 31.4–37.4)
MCV RBC AUTO: 93 FL (ref 82–98)
MONOCYTES # BLD AUTO: 0.96 THOUSAND/ΜL (ref 0.17–1.22)
MONOCYTES NFR BLD AUTO: 8 % (ref 4–12)
NEUTROPHILS # BLD AUTO: 10.17 THOUSANDS/ΜL (ref 1.85–7.62)
NEUTS SEG NFR BLD AUTO: 84 % (ref 43–75)
NRBC BLD AUTO-RTO: 0 /100 WBCS
PLATELET # BLD AUTO: 273 THOUSANDS/UL (ref 149–390)
PMV BLD AUTO: 10.3 FL (ref 8.9–12.7)
POTASSIUM SERPL-SCNC: 4.5 MMOL/L (ref 3.5–5.3)
PROT SERPL-MCNC: 7.5 G/DL (ref 6.4–8.4)
RBC # BLD AUTO: 2.71 MILLION/UL (ref 3.81–5.12)
SODIUM SERPL-SCNC: 133 MMOL/L (ref 135–147)
WBC # BLD AUTO: 12.08 THOUSAND/UL (ref 4.31–10.16)

## 2025-02-17 PROCEDURE — 80053 COMPREHEN METABOLIC PANEL: CPT | Performed by: INTERNAL MEDICINE

## 2025-02-17 PROCEDURE — 83735 ASSAY OF MAGNESIUM: CPT | Performed by: INTERNAL MEDICINE

## 2025-02-17 PROCEDURE — 96365 THER/PROPH/DIAG IV INF INIT: CPT

## 2025-02-17 PROCEDURE — 85025 COMPLETE CBC W/AUTO DIFF WBC: CPT | Performed by: INTERNAL MEDICINE

## 2025-02-17 RX ADMIN — MAGNESIUM SULFATE HEPTAHYDRATE: 500 INJECTION, SOLUTION INTRAMUSCULAR; INTRAVENOUS at 13:23

## 2025-02-17 NOTE — ASSESSMENT & PLAN NOTE
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 Unfortunately, patient now exhibits interval progression of disease involving the liver. She is also noted to have a new subcentimeter right lung nodule (Measuring 3-millimeters), and stable large right adnexal mass with low-level FDG-activity. Given the above-mentioned, patient was reinitiated on systemic-therapy with FOLFIRI with fifty-percent dose-reduction of Irinotecan due to existing diarrhea (Cycle 1 initiated on January 8th, 2025). Patient has since completed two-cycles of the above-mentioned (Last administered on January 22nd, 2025). Cycle 3 was deferred until follow-up today, and growth-factor support was administered in the interim, in the setting of neutropenia.     On evaluation this morning, patient is clinically stable; and appears slightly better than previous. She has not tolerated Cycles 1 and 2 of dose-attenuated FOLFIRI appropriately, as evidenced by significant treatment-related toxicities, including fatigue, and diarrhea (Note: Diarrhea is likely complicated by tube-feeds). Repeat laboratory-parameters show leukocytosis with absolute neutrophilia (WBC: 12.08 ANC: 10,170), stable normocytic anemia (Hemoglobin: 8.2 MCV:  93), mild hyponatremia (Sodium: 133), and hypomagnesemia (Magnesium: 1.8). Discussed options at length with patient, including further treatment hold, versus resumption of systemic-therapy as planned. Expressed concerns regarding her ability to tolerate chemotherapy, at the present time. She expressed the same; and requested to wait an additional two-weeks prior to restarting. Will coordinate for close interval follow-up with in-office reassessment, prior to resuming treatment. In anticipation of resumption of Cycle 5 on March 5th, 2025, will obtain a re-staging CT Chest, Abdomen, and Pelvis to reassess disease status. Will also plan to remove the 5-Fluorouracil bolus, and add growth-factor support to all future cycles (Beginning with Cycle 5). In the interim, will treat refractory oral candidiasis with Fluconazole, and continue thrice-weekly intravenous-hydration. Patient expressed understanding and agreement with the above-mentioned plan.     Note: Discussed concerns regarding tolerance of palliative systemic-chemotherapy. Expressed that patient may understandably reach a point that the treatment-related toxicities and their associated impacts on quality of life may eventually outweigh the benefits of continued therapy. In that case, explained that there are options for comfort-focused care, including hospice. Patient is still treatment-focused at this time, thus, will proceed as outlined above.     Summary of Recommendations:  Start Fluconazole 100 mg Once then 200 mg Daily for a total of 9-days.  Continue thrice-weekly intravenous-hydration (Monday, Wednesday, and Friday).  Oncology Nutrition anticipating addition of banana-flakes, to assist with diarrhea.  Cancel Cycle 4 due to persistent treatment-related toxicities:  Will tentatively plan for resumption of Cycle 5 upon in-office reassessment:  Added growth-factor support to all subsequent cycles.  Removed 5-Fluorouracil bolus from treatment plan, for all  cycles.  Obtain re-staging CT Chest, Abdomen, and Pelvis with PO and IV contrast.  Recommend close interval follow-up on February 18th, 2024, prior to Cycle 4.

## 2025-02-17 NOTE — PROGRESS NOTES
Medical Oncology: Outpatient Progress Note:    Name: Maira Moura      : 1955      MRN: [id number]  Encounter Provider: Harika Medina DO  Encounter Date: 2025 Encounter department: [facility]  Assessment & Plan  Metastatic colon cancer to liver (HCC)  Patient is a 69-year-old postmenopausal female, with a complex Oncologic history, including synchronous de naveed metastatic adenocarcinoma of the mid-ascending colon (Complicated by partial-obstruction requiring right hemicolectomy on 2024) with biopsy-proven oligometastatic disease to the liver (Status-post cryoablation on 2024) with further disease progression in 2024 (Currently on FOLFIRI with dose-attenuation of Irinotecan); who presents today for interval follow-up. 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 Unfortunately, patient now exhibits interval progression of disease involving the liver. She is also noted to have a new subcentimeter right lung nodule (Measuring 3-millimeters), and stable large right adnexal mass with low-level FDG-activity. Given the above-mentioned, patient was reinitiated on systemic-therapy with FOLFIRI with fifty-percent dose-reduction of Irinotecan due to existing diarrhea (Cycle 1 initiated on 2025). Patient has since completed two-cycles of the above-mentioned (Last administered on 2025). Cycle 3 was deferred until follow-up today, and growth-factor support was administered in the interim, in the setting of neutropenia.     On evaluation this morning, patient is clinically stable; and appears slightly better than previous. She has not tolerated Cycles 1 and 2 of dose-attenuated  FOLFIRI appropriately, as evidenced by significant treatment-related toxicities, including fatigue, and diarrhea (Note: Diarrhea is likely complicated by tube-feeds). Repeat laboratory-parameters show leukocytosis with absolute neutrophilia (WBC: 12.08 ANC: 10,170), stable normocytic anemia (Hemoglobin: 8.2 MCV: 93), mild hyponatremia (Sodium: 133), and hypomagnesemia (Magnesium: 1.8). Discussed options at length with patient, including further treatment hold, versus resumption of systemic-therapy as planned. Expressed concerns regarding her ability to tolerate chemotherapy, at the present time. She expressed the same; and requested to wait an additional two-weeks prior to restarting. Will coordinate for close interval follow-up with in-office reassessment, prior to resuming treatment. In anticipation of resumption of Cycle 5 on March 5th, 2025, will obtain a re-staging CT Chest, Abdomen, and Pelvis to reassess disease status. Will also plan to remove the 5-Fluorouracil bolus, and add growth-factor support to all future cycles (Beginning with Cycle 5). In the interim, will treat refractory oral candidiasis with Fluconazole, and continue thrice-weekly intravenous-hydration. Patient expressed understanding and agreement with the above-mentioned plan.     Note: Discussed concerns regarding tolerance of palliative systemic-chemotherapy. Expressed that patient may understandably reach a point that the treatment-related toxicities and their associated impacts on quality of life may eventually outweigh the benefits of continued therapy. In that case, explained that there are options for comfort-focused care, including hospice. Patient is still treatment-focused at this time, thus, will proceed as outlined above.     Summary of Recommendations:  Start Fluconazole 100 mg Once then 200 mg Daily for a total of 9-days.  Continue thrice-weekly intravenous-hydration (Monday, Wednesday, and Friday).  Oncology Nutrition  anticipating addition of banana-flakes, to assist with diarrhea.  Cancel Cycle 4 due to persistent treatment-related toxicities:  Will tentatively plan for resumption of Cycle 5 upon in-office reassessment:  Added growth-factor support to all subsequent cycles.  Removed 5-Fluorouracil bolus from treatment plan, for all cycles.  Obtain re-staging CT Chest, Abdomen, and Pelvis with PO and IV contrast.  Recommend close interval follow-up on February 18th, 2024, prior to Cycle 4.       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 On completion of definitive chemoradiation, repeat PET-CT was obtained on December 27th, 2024; and demonstrated interval near-complete metabolic response to therapy. Given the above-mentioned, will proceed with active-surveillance at the present time. Patient expressed understanding and agreement with the above-mentioned plan.     Summary of Recommendations:  Continue active-surveillance:  Follow-up with Radiation Oncology, and Otolaryngology, as scheduled.  Continue surveillance-nasolaryngoscopy per Otolaryngology.  Patient to continue working with Speech-Therapy to improve swallowing-function.  Recommend close interval follow-up in approximately 2-weeks (See above).  Plan to monitor laboratory-parameters, and re-image every 3-months.        History of Present Illness   Chief Complaint: Outpatient Follow-Up.  Interval Events: Maira Moura is a 69-year-old postmenopausal female, with a complex Oncologic history, including early-stage hormone-positive HER-2 negative right breast cancer (Status-post right lumpectomy with sentinel lymph node biopsy on December 20th, 2018 and whole-breast radiation completed in April 2019; On adjuvant  endocrine-therapy with Anastrozole since February 2019), as well as synchronous de naveed metastatic adenocarcinoma of the mid-ascending colon (Complicated by partial-obstruction requiring right hemicolectomy on March 15th, 2024) with biopsy-proven oligometastatic disease to the liver (Status-post cryoablation on June 3rd, 2024), and unresectable 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      On evaluation this morning, patient states that she feels lightheaded. She describes symptomatic-orthostasis, characterizing lightheadedness with positional changes (e.g. Sitting to standing). She continues to receive thrice-weekly intravenous hydration (e.g. Most recently administered intravenous fluids yesterday (Monday), February 19th, 2025), and does not a benefit from this. Since Cycle 3 of FOLFIRI was cancelled, patient has felt largely the same from previous. Energy levels are largely stable. Patient has increased tube-feed frequency from once to three-times daily. She is tolerating the above-mentioned appropriately with mild nausea without emesis. She is still not taking much by mouth (e.g. Small bites of pudding only). Weight has remained stable in the 161 to 165-lb range. Patient alternates between constipation and diarrhea; although has a higher frequency of diarrhea, occurring postprandially, following tube-feed administration. Patient states that the Oncology Nutritionist will be starting banana-flakes, accordingly. She also utilizes Cholestyramine with alleviation of the above-mentioned, as well. Lastly, patient has persistent thrush involving the tongue and oropharynx; despite use of Nystatin Swish and Swallow. She has no further concerns or complaints at the present time.    Oncology History   Cancer Staging   Malignant neoplasm of  right female breast (HCC)  Staging form: Breast, AJCC 8th Edition  - Pathologic: Stage IA (pT2, pN0, cM0, G2, ER: Positive, NM: Positive, HER2: Negative) - Signed by Rosalva Drake MD on 1/11/2019  Histologic grading system: 3 grade system    Metastatic colon cancer to liver (HCC)  Staging form: Colon and Rectum, AJCC 8th Edition  - Clinical stage from 7/13/2023: cT3, cN0, pM1 - Signed by Harika Medina DO on 9/28/2023  Total positive nodes: 0  - Pathologic: pT3, pN0, cM1 - Signed by Vickie Israel MD on 4/3/2024  Total positive nodes: 0    Right tonsillar squamous cell carcinoma (HCC)  Staging form: Pharynx - Oropharynx, AJCC 8th Edition  - Clinical stage from 7/27/2023: Stage III (cT1, cN3, cM0, p16+) - Signed by Evan Plummer MD on 7/27/2023  Stage prefix: Initial diagnosis  Oncology History Overview Note   2/2019 - pT2N0 breast cancer treated with anastrozole, oncotype 13, ER+ NM+ Her2 neg     7/2023 - metastatic ascending colon adenocarcinoma to liver     Caris - KRAS G12D, CAIN, PD-L1 0%     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, unresectable     7/31/2023 - FOLFOX     11/20/2023 - opdivo added to FOLFOX     12/18/2023-cycle 11-20% dose reduction of 5-FU bolus and oxaliplatin due to increased fatigue     Jan 2024 - oxaliplatin removed from treatment plan due to neuropathy - PET scan shows good disease control in all areas except colon lesion     3/2024 - right hemicolectomy - pT3N0     6/3/24 - cryoablation to liver (no interruption to systemic therapy)     7/30/2024 - stop folfox     9/2024 - 10/2024 - cis/RT to tonsils and cervical Lns    12/2024 - disease progression of colon cancer in the liver    01/2025 - Initiated FOLFIRI every 14-days, with 50% dose-reduction of Irinotecan due to pre-existing diarrhea.    02/05/2025 - Cancelled Cycle 3 of FOLFIRI due to significant chemotherapy-induced toxicities (Including: Neutropenia). Will remove 5-Fluorouracil bolus and growth factor  "support beginning with subsequent cycles of treatment.     Malignant neoplasm of right female breast (HCC)   11/23/2018 Initial Diagnosis    Malignant neoplasm of right female breast (HCC)     11/23/2018 Biopsy    Rt Breast US BX 1100 8cmfn, 4 passes 12g Marquee:  - Invasive breast carcinoma of no special type (ductal NST/invasive ductal carcinoma).  - grade 2  - %  - RI 95%  - HER2 negative     12/20/2018 Surgery    Right partial mastectomy and SLN biopsy:  Infiltrating ductal carcinoma, Grade 2/3, felt to be between 2.0 cm and 2.5 cm in greatest dimension  - No invasive tumor is seen at inked margins, but there is a focus of DCIS seen within approximately 1mm of the inked medial margin  - no LVI  - no LCIS  - 0/2 LN's  at least Stage IIA - pT2, pN0, cM0, G2.    - Dr Coronado     12/20/2018 -  Cancer Staged    Stage IIA - pT2, pN0, cM0, G2.       1/4/2019 Genomic Testing    Oncotype DX score: 13     2/1/2019 -  Hormone Therapy    anastrozole 1 mg daily as adjuvant endocrine therapy    - Dr Daley       2/14/2019 - 4/3/2019 Radiation    Course: C1    Plan ID Energy Fractions Dose per Fraction (cGy) Dose Correction (cGy) Total Dose Delivered (cGy) Elapsed Days   R Breast 10X/6X 25 / 25 200 0 5,000 40   R BRST BOOST 16E 5 / 5 200 0 1,000 7      Treatment dates:  C1: 2/14/2019 - 4/3/2019       Metastatic colon cancer to liver (HCC)   7/6/2023 Genetic Testing    CARIS Results:   KRAS Pathogenic Variant Exon 2  p.G12D : lack of benefit of cetuximab, panitumumab Level 2   No other actionable mutation; MSI stable; TMB low   MiFOLOXAi Results: \"Decreased Benefit of FOLFOX + Bevacizumab in first line metastatic CRC.  This patient may achieve improved results by receiving an alternative to FOLFOX, such as FOLFIRI, as their initial regimen. As an adjustment to frontline FOLFOXIRI following toxicity: This patient may achieve improved results by removing the oxaliplatin portion of their regimen\".         7/11/2023 Initial " Diagnosis    Metastatic colon cancer to liver (HCC)     7/13/2023 -  Cancer Staged    Staging form: Colon and Rectum, AJCC 8th Edition  - Clinical stage from 7/13/2023: cT3, cN0, pM1 - Signed by Harika Medina DO on 9/28/2023  Total positive nodes: 0       7/31/2023 - 8/1/2024 Chemotherapy    cyanocobalamin, 1,000 mcg, Intramuscular, Every 30 days, 7 of 9 cycles  Administration: 1,000 mcg (5/9/2024), 1,000 mcg (5/24/2024), 1,000 mcg (6/20/2024), 1,000 mcg (7/3/2024), 1,000 mcg (7/18/2024), 1,000 mcg (8/1/2024)  potassium chloride, 20 mEq, Intravenous, Once, 2 of 2 cycles  Administration: 20 mEq (11/6/2023), 20 mEq (11/20/2023)  alteplase (CATHFLO), 2 mg, Intracatheter, Every 1 Minute as needed, 21 of 23 cycles  Administration: 2 mg (1/3/2024)  pegfilgrastim (NEULASTA), 6 mg, Subcutaneous, Once, 12 of 12 cycles  Administration: 6 mg (10/25/2023), 6 mg (11/8/2023), 6 mg (11/22/2023), 6 mg (12/6/2023), 6 mg (12/20/2023), 6 mg (1/12/2024), 6 mg (1/25/2024), 6 mg (4/25/2024), 6 mg (5/9/2024), 6 mg (5/24/2024), 6 mg (6/20/2024)  fluorouracil (ADRUCIL), 895 mg, Intravenous, Once, 21 of 23 cycles  Dose modification: 320 mg/m2 (original dose 400 mg/m2, Cycle 11)  Administration: 900 mg (7/31/2023), 900 mg (8/14/2023), 900 mg (8/28/2023), 900 mg (9/11/2023), 900 mg (9/25/2023), 900 mg (10/9/2023), 900 mg (10/23/2023), 895 mg (11/6/2023), 895 mg (11/20/2023), 895 mg (12/4/2023), 700 mg (12/18/2023), 680 mg (1/10/2024), 680 mg (1/23/2024), 625 mg (4/23/2024), 625 mg (5/7/2024), 625 mg (5/22/2024), 625 mg (6/4/2024), 625 mg (6/18/2024), 625 mg (7/1/2024), 625 mg (7/16/2024), 625 mg (7/30/2024)  nivolumab (OPDIVO) IVPB, 240 mg (100 % of original dose 240 mg), Intravenous, Once, 13 of 15 cycles  Dose modification: 240 mg (original dose 240 mg, Cycle 9)  Administration: 240 mg (11/20/2023), 240 mg (12/4/2023), 240 mg (12/18/2023), 240 mg (1/10/2024), 240 mg (1/23/2024), 240 mg (4/23/2024), 240 mg (5/7/2024), 240 mg (5/22/2024),  240 mg (6/4/2024), 240 mg (6/18/2024), 240 mg (7/1/2024), 240 mg (7/16/2024), 240 mg (7/30/2024)  leucovorin calcium IVPB, 896 mg, Intravenous, Once, 21 of 23 cycles  Administration: 900 mg (7/31/2023), 900 mg (8/14/2023), 900 mg (8/28/2023), 900 mg (9/11/2023), 900 mg (9/25/2023), 900 mg (10/9/2023), 900 mg (10/23/2023), 900 mg (11/6/2023), 900 mg (11/20/2023), 900 mg (12/4/2023), 900 mg (12/18/2023), 850 mg (1/10/2024), 850 mg (1/23/2024), 800 mg (4/23/2024), 800 mg (5/7/2024), 800 mg (5/22/2024), 800 mg (6/4/2024), 800 mg (6/18/2024), 800 mg (7/1/2024), 800 mg (7/16/2024), 800 mg (7/30/2024)  oxaliplatin (ELOXATIN) chemo infusion, 85 mg/m2 = 190.4 mg, Intravenous, Once, 12 of 12 cycles  Dose modification: 68 mg/m2 (original dose 85 mg/m2, Cycle 11, Reason: Other (Must fill in a comment), Comment: increased fatigue)  Administration: 190.4 mg (7/31/2023), 190.4 mg (8/14/2023), 200 mg (8/28/2023), 200 mg (9/11/2023), 200 mg (9/25/2023), 200 mg (10/9/2023), 200 mg (10/23/2023), 200 mg (11/6/2023), 200 mg (11/20/2023), 190.4 mg (12/4/2023), 150 mg (12/18/2023), 150 mg (1/10/2024)  fluorouracil (ADRUCIL) ambulatory infusion Soln, 1,200 mg/m2/day = 5,375 mg, Intravenous, Over 46 hours, 21 of 23 cycles     9/26/2023 -  Cancer Staged    Staging form: Colon and Rectum, AJCC 8th Edition  - Pathologic: pT3, pN0, cM1 - Signed by Vickie Israel MD on 4/3/2024  Total positive nodes: 0       3/12/2024 Surgery    A. Terminal ileum, appendix, right colon (right hemicolectomy):  - Adenocarcinoma (5.2cm)  - Forty-nine (49) lymph nodes negative for carcinoma (0/49)    - Acellular mucin present in one (1) lymph node   - See staging synoptic (ypT3N0)     1/8/2025 -  Chemotherapy    alteplase (CATHFLO), 2 mg, Intracatheter, Every 1 Minute as needed, 2 of 5 cycles  pegfilgrastim (NEULASTA), 6 mg, Subcutaneous, Once, 0 of 3 cycles  fluorouracil (ADRUCIL), 400 mg/m2 = 770 mg, Intravenous, Once, 2 of 5 cycles  Administration: 770 mg  (1/8/2025), 770 mg (1/22/2025)  irinotecan (CAMPTOSAR) chemo infusion, 173 mg (50 % of original dose 180 mg/m2), Intravenous, Once, 2 of 5 cycles  Dose modification: 90 mg/m2 (original dose 180 mg/m2, Cycle 1, Reason: Anticipated Tolerance, Comment: 50% dose reduction due to pre-existing diarrhea)  Administration: 180 mg (1/8/2025), 180 mg (1/22/2025)  leucovorin calcium IVPB, 768 mg, Intravenous, Once, 2 of 5 cycles  Administration: 800 mg (1/8/2025), 800 mg (1/22/2025)  fluorouracil (ADRUCIL) ambulatory infusion Soln, 1,200 mg/m2/day = 4,610 mg, Intravenous, Over 46 hours, 2 of 5 cycles     Right tonsillar squamous cell carcinoma (HCC)   7/27/2023 Initial Diagnosis    Right tonsillar squamous cell carcinoma (HCC)     7/27/2023 -  Cancer Staged    Staging form: Pharynx - Oropharynx, AJCC 8th Edition  - Clinical stage from 7/27/2023: Stage III (cT1, cN3, cM0, p16+) - Signed by Evan Plummer MD on 7/27/2023  Stage prefix: Initial diagnosis       9/16/2024 - 11/13/2024 Radiation      Plan ID Energy Fractions Dose per Fraction (cGy) Dose Correction (cGy) Total Dose Delivered (cGy) Elapsed Days   Head_Neck 6X-FFF 35 / 35 200 0 7,000 58    C2: 9/16/2024 - 11/13/2024 9/17/2024 - 10/21/2024 Chemotherapy    alteplase (CATHFLO), 2 mg, Intracatheter, Every 1 Minute as needed, 6 of 6 cycles  Administration: 2 mg (10/21/2024)  CISplatin (PLATINOL) infusion, 70 mg (original dose 40 mg/m2), Intravenous, Once, 6 of 6 cycles  Dose modification: 70 mg (original dose 40 mg/m2, Cycle 1, Reason: Anticipated Tolerance), 30 mg/m2 (original dose 40 mg/m2, Cycle 4, Reason: Neuropathy, Comment: dose reduction to 30 mg/m2 due to neuropathy)  Administration: 70 mg (9/17/2024), 70 mg (9/23/2024), 70 mg (9/30/2024), 59.1 mg (10/7/2024), 59.1 mg (10/14/2024), 59.1 mg (10/21/2024)  sodium chloride, 500 mL, Intravenous, Once, 6 of 6 cycles  Administration: 500 mL (9/17/2024), 500 mL (9/17/2024), 500 mL (9/23/2024), 500 mL  "(9/23/2024), 500 mL (9/30/2024), 500 mL (9/30/2024), 500 mL (10/7/2024), 500 mL (10/7/2024), 500 mL (10/14/2024), 500 mL (10/14/2024), 500 mL (10/21/2024)  fosaprepitant (EMEND) IVPB, 150 mg, Intravenous, Once, 6 of 6 cycles  Administration: 150 mg (9/17/2024), 150 mg (9/23/2024), 150 mg (9/30/2024), 150 mg (10/7/2024), 150 mg (10/14/2024), 150 mg (10/21/2024)  IVPB builder, , Intravenous, Once, 1 of 1 cycle  Administration: Unknown dose (10/21/2024)        Review of Systems  Review of Systems:  All systems reviewed and negative except otherwise listed in the History of Present Illness.      Objective   /70 (BP Location: Left arm, Patient Position: Sitting, Cuff Size: Adult)   Pulse 60   Temp 97.8 °F (36.6 °C) (Temporal)   Resp 16   Ht 5' 5.98\" (1.676 m)   Wt 73 kg (161 lb)   SpO2 93%   BMI 26.00 kg/m²     ECOG   2-Points.    Physical Exam:  General: Alert, and oriented; Pleasant; Ill-Appearing; Frail  HEENT: Atraumatic, and normocephalic; PERRLA; EOMI; Moist membranes; Thrush  Cardiovascular: Tachycardia (Regular); No murmurs, rubs, or gallops; No edema  Respiratory: Clear to auscultation bilaterally; Adequate aeration; No supplemental oxygen   Abdomen: Soft, Non-tender; Non-distended; No organomegaly; Bowel sounds present  Extremities: No obvious deformities; No peripheral edema; Moves all  Neurologic: Appropriately alert, and oriented to Person, Place, and Time; No focal neurologic deficits    Labs: I have reviewed the following labs:  Lab Results   Component Value Date/Time    WBC 12.08 (H) 02/17/2025 01:16 PM    RBC 2.71 (L) 02/17/2025 01:16 PM    Hemoglobin 8.2 (L) 02/17/2025 01:16 PM    Hematocrit 25.3 (L) 02/17/2025 01:16 PM    MCV 93 02/17/2025 01:16 PM    MCH 30.3 02/17/2025 01:16 PM    RDW 16.6 (H) 02/17/2025 01:16 PM    Platelets 273 02/17/2025 01:16 PM    Segmented % 84 (H) 02/17/2025 01:16 PM    Lymphocytes % 7 (L) 02/17/2025 01:16 PM    Monocytes % 8 02/17/2025 01:16 PM    Eosinophils " Relative 0 02/17/2025 01:16 PM    Basophils Relative 0 02/17/2025 01:16 PM    Immature Grans % 1 02/17/2025 01:16 PM    Absolute Neutrophils 10.17 (H) 02/17/2025 01:16 PM     Lab Results   Component Value Date/Time    Potassium 4.5 02/17/2025 01:16 PM    Chloride 99 02/17/2025 01:16 PM    CO2 28 02/17/2025 01:16 PM    BUN 16 02/17/2025 01:16 PM    Creatinine 1.02 02/17/2025 01:16 PM    Glucose, Fasting 102 (H) 10/30/2024 04:34 AM    Calcium 9.2 02/17/2025 01:16 PM    Corrected Calcium 9.8 02/17/2025 01:16 PM    AST 29 02/17/2025 01:16 PM    ALT 11 02/17/2025 01:16 PM    Alkaline Phosphatase 141 (H) 02/17/2025 01:16 PM    Total Protein 7.5 02/17/2025 01:16 PM    Albumin 3.2 (L) 02/17/2025 01:16 PM    Total Bilirubin 0.39 02/17/2025 01:16 PM    eGFR 56 02/17/2025 01:16 PM     Patient was seen and discussed with attending physician, Harika Medina D.O.    Carole Newman D.O.  Hematology-Oncology Fellow (PGY-5)

## 2025-02-17 NOTE — PROGRESS NOTES
Patient arrived to unit without complaint. Patient had central labs drawn and tolerated NSS with Magnesium infusion without incident. AVS declined and patient aware of next appointment on 2/19/25 at 12:00. Patient left in stable condition.

## 2025-02-18 ENCOUNTER — OFFICE VISIT (OUTPATIENT)
Dept: HEMATOLOGY ONCOLOGY | Facility: CLINIC | Age: 70
End: 2025-02-18
Payer: MEDICARE

## 2025-02-18 ENCOUNTER — TELEPHONE (OUTPATIENT)
Dept: HEMATOLOGY ONCOLOGY | Facility: CLINIC | Age: 70
End: 2025-02-18

## 2025-02-18 ENCOUNTER — APPOINTMENT (OUTPATIENT)
Dept: SPEECH THERAPY | Facility: CLINIC | Age: 70
End: 2025-02-18
Payer: MEDICARE

## 2025-02-18 VITALS
SYSTOLIC BLOOD PRESSURE: 110 MMHG | WEIGHT: 161 LBS | DIASTOLIC BLOOD PRESSURE: 70 MMHG | RESPIRATION RATE: 16 BRPM | TEMPERATURE: 97.8 F | OXYGEN SATURATION: 93 % | HEART RATE: 60 BPM | BODY MASS INDEX: 25.88 KG/M2 | HEIGHT: 66 IN

## 2025-02-18 DIAGNOSIS — C78.7 METASTATIC COLON CANCER TO LIVER (HCC): Primary | ICD-10-CM

## 2025-02-18 DIAGNOSIS — R13.12 DYSPHAGIA, OROPHARYNGEAL PHASE: Primary | ICD-10-CM

## 2025-02-18 DIAGNOSIS — Z90.89 STATUS POST TONSILLECTOMY: ICD-10-CM

## 2025-02-18 DIAGNOSIS — C09.9 RIGHT TONSILLAR SQUAMOUS CELL CARCINOMA (HCC): ICD-10-CM

## 2025-02-18 DIAGNOSIS — B37.0 THRUSH: ICD-10-CM

## 2025-02-18 DIAGNOSIS — C18.9 METASTATIC COLON CANCER TO LIVER (HCC): Primary | ICD-10-CM

## 2025-02-18 PROCEDURE — 99215 OFFICE O/P EST HI 40 MIN: CPT | Performed by: INTERNAL MEDICINE

## 2025-02-18 PROCEDURE — G2211 COMPLEX E/M VISIT ADD ON: HCPCS | Performed by: INTERNAL MEDICINE

## 2025-02-18 RX ORDER — FLUCONAZOLE 100 MG/1
TABLET ORAL
Qty: 11 TABLET | Refills: 0 | Status: SHIPPED | OUTPATIENT
Start: 2025-02-18 | End: 2025-02-28

## 2025-02-18 NOTE — PROGRESS NOTES
"Daily Speech Treatment Note    Today's date: 2025  Patient’s name: Maira Moura  : 1955  MRN: [id number]  Safety measures: HTN, CVA, GERD, active CA, s/p PEG tube placement, aspiration precautions  Current recommended diet: PEG tube for nutrition/hydration/medications   Referring provider: Harika Medina DO    Encounter Diagnosis     ICD-10-CM    1. Dysphagia, oropharyngeal phase  R13.12       2. Status post tonsillectomy  Z90.89       3. 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 Patient reported that she has been working on her exercises at home.    Short-term goals:  Patient will receive a videofluoroscopic swallow study (VFSS) to assess her current swallowing function to r/o penetration or aspiration, to determine the safest, least restrictive diet, and to recommended the appropriate rehabilitation exercises (to be achieved in 2-3 weeks).     Patient will be educated on safe swallowing compensatory strategies (e.g., upright posture, small bites/sips, slow rate, alternation of consistencies, multiple swallows, effortful swallow, etc.) to facilitate increased safety with P.O. intake (to be achieved in 4-6 weeks).     Patient will tolerate P.O. trials of her recommended diet with the implementation of safe swallowing strategies without overt s/sx of penetration and/or aspiration during skilled therapy sessions in this certification period (to be achieved in " 4-6 weeks).     Traditional VitalStim     Channel(s) used: 1, 2   Placement: 3a   Duty Cycle: 57/1 s   Frequency: 80 pulses per second   Phase Duration: 300 microseconds   Treatment Duration: 25 minutes   Min mA level: 6.0 ***mA   Max mA level: 8.5 ***mA      Patient tolerated NMES in conjunction with pharyngeal/laryngeal strengthening exercises and P.O. intake. (The patient received instruction on the potential benefits from NMES treatment, including neuromuscular re-education and muscle strengthening.) Skin condition post-NMES: intact.      Patient consumed the following during today’s session: ***Trials of nectar-thick lemon water via side of cup -- NMES remained on during pharyngeal strengthening exercises (see below).     In total, patient completed ***15 sips of NTL throughout today's session.    Oral containment and bolus formation/control were judged to be WFL. Increased time AP transfer. Swallow initiation appeared to be delayed. Reduced hyolaryngeal elevation and excursion noted upon palpation. Patient implemented small sips, multiple swallows, and slow rate with liquid P.O. Throat clear x 5 and cough x1 during trials.     Patient will perform oral motor exercises using IOPI device on lingual muscles to facilitate increased function and strength by 25% for improved swallowing function (to be achieved in 6-8 weeks).     Patient will independently complete pharyngeal strengthening exercises (e.g., Effortful swallow, Mendelsohn, Evelia) daily to facilitate increased pharyngeal strength and coordination (to be achieved in 4-6 weeks).   -Patient completed the following pharyngeal strengthening exercises during today's session:***  *Effortful swallow: 1 set of 5 reps  *Mendelsohn: 1 set of 5 reps  *Evelia: 1 set of 5 reps     Plan:  -Patient was provided with home exercises/activities to target goals in plan of care at the end of today's session.  -Continue with current plan of care.

## 2025-02-18 NOTE — TELEPHONE ENCOUNTER
Left VM for patient to inform her of appointment today with Dr. Medina. Advised she keep her phone near her so when transportation comes, they can reach her.

## 2025-02-19 ENCOUNTER — NUTRITION (OUTPATIENT)
Dept: NUTRITION | Facility: CLINIC | Age: 70
End: 2025-02-19

## 2025-02-19 ENCOUNTER — HOSPITAL ENCOUNTER (OUTPATIENT)
Dept: INFUSION CENTER | Facility: CLINIC | Age: 70
Discharge: HOME | End: 2025-02-19

## 2025-02-19 ENCOUNTER — HOSPITAL ENCOUNTER (OUTPATIENT)
Dept: INFUSION CENTER | Facility: CLINIC | Age: 70
Discharge: HOME | End: 2025-02-19
Payer: MEDICARE

## 2025-02-19 VITALS
DIASTOLIC BLOOD PRESSURE: 81 MMHG | RESPIRATION RATE: 16 BRPM | SYSTOLIC BLOOD PRESSURE: 121 MMHG | TEMPERATURE: 97.7 F | HEART RATE: 109 BPM

## 2025-02-19 DIAGNOSIS — C78.7 METASTATIC COLON CANCER TO LIVER (HCC): ICD-10-CM

## 2025-02-19 DIAGNOSIS — R94.8 ABNORMAL PET SCAN OF COLON: ICD-10-CM

## 2025-02-19 DIAGNOSIS — C50.411 MALIGNANT NEOPLASM OF UPPER-OUTER QUADRANT OF RIGHT BREAST IN FEMALE, ESTROGEN RECEPTOR POSITIVE (HCC): Primary | ICD-10-CM

## 2025-02-19 DIAGNOSIS — C18.9 METASTATIC COLON CANCER TO LIVER (HCC): ICD-10-CM

## 2025-02-19 DIAGNOSIS — Z17.0 MALIGNANT NEOPLASM OF UPPER-OUTER QUADRANT OF RIGHT BREAST IN FEMALE, ESTROGEN RECEPTOR POSITIVE (HCC): ICD-10-CM

## 2025-02-19 DIAGNOSIS — C50.411 MALIGNANT NEOPLASM OF UPPER-OUTER QUADRANT OF RIGHT BREAST IN FEMALE, ESTROGEN RECEPTOR POSITIVE (HCC): ICD-10-CM

## 2025-02-19 DIAGNOSIS — Z71.3 NUTRITIONAL COUNSELING: Primary | ICD-10-CM

## 2025-02-19 DIAGNOSIS — Z17.0 MALIGNANT NEOPLASM OF UPPER-OUTER QUADRANT OF RIGHT BREAST IN FEMALE, ESTROGEN RECEPTOR POSITIVE (HCC): Primary | ICD-10-CM

## 2025-02-19 LAB
ALBUMIN SERPL BCG-MCNC: 3.2 G/DL (ref 3.5–5)
ALP SERPL-CCNC: 120 U/L (ref 34–104)
ALT SERPL W P-5'-P-CCNC: 13 U/L (ref 7–52)
ANION GAP SERPL CALCULATED.3IONS-SCNC: 6 MMOL/L (ref 4–13)
AST SERPL W P-5'-P-CCNC: 35 U/L (ref 13–39)
BASOPHILS # BLD AUTO: 0.06 THOUSANDS/ΜL (ref 0–0.1)
BASOPHILS NFR BLD AUTO: 1 % (ref 0–1)
BILIRUB SERPL-MCNC: 0.34 MG/DL (ref 0.2–1)
BUN SERPL-MCNC: 19 MG/DL (ref 5–25)
CALCIUM ALBUM COR SERPL-MCNC: 9.7 MG/DL (ref 8.3–10.1)
CALCIUM SERPL-MCNC: 9.1 MG/DL (ref 8.4–10.2)
CHLORIDE SERPL-SCNC: 100 MMOL/L (ref 96–108)
CO2 SERPL-SCNC: 26 MMOL/L (ref 21–32)
CREAT SERPL-MCNC: 1.09 MG/DL (ref 0.6–1.3)
EOSINOPHIL # BLD AUTO: 0.02 THOUSAND/ΜL (ref 0–0.61)
EOSINOPHIL NFR BLD AUTO: 0 % (ref 0–6)
ERYTHROCYTE [DISTWIDTH] IN BLOOD BY AUTOMATED COUNT: 16.6 % (ref 11.6–15.1)
GFR SERPL CREATININE-BSD FRML MDRD: 51 ML/MIN/1.73SQ M
GLUCOSE SERPL-MCNC: 101 MG/DL (ref 65–140)
HCT VFR BLD AUTO: 25.4 % (ref 34.8–46.1)
HGB BLD-MCNC: 8.1 G/DL (ref 11.5–15.4)
IMM GRANULOCYTES # BLD AUTO: 0.11 THOUSAND/UL (ref 0–0.2)
IMM GRANULOCYTES NFR BLD AUTO: 2 % (ref 0–2)
LYMPHOCYTES # BLD AUTO: 0.7 THOUSANDS/ΜL (ref 0.6–4.47)
LYMPHOCYTES NFR BLD AUTO: 10 % (ref 14–44)
MAGNESIUM SERPL-MCNC: 2.1 MG/DL (ref 1.9–2.7)
MCH RBC QN AUTO: 30.6 PG (ref 26.8–34.3)
MCHC RBC AUTO-ENTMCNC: 31.9 G/DL (ref 31.4–37.4)
MCV RBC AUTO: 96 FL (ref 82–98)
MONOCYTES # BLD AUTO: 0.75 THOUSAND/ΜL (ref 0.17–1.22)
MONOCYTES NFR BLD AUTO: 11 % (ref 4–12)
NEUTROPHILS # BLD AUTO: 5.4 THOUSANDS/ΜL (ref 1.85–7.62)
NEUTS SEG NFR BLD AUTO: 76 % (ref 43–75)
NRBC BLD AUTO-RTO: 0 /100 WBCS
PLATELET # BLD AUTO: 366 THOUSANDS/UL (ref 149–390)
PMV BLD AUTO: 9.8 FL (ref 8.9–12.7)
POTASSIUM SERPL-SCNC: 4.7 MMOL/L (ref 3.5–5.3)
PROT SERPL-MCNC: 7.4 G/DL (ref 6.4–8.4)
RBC # BLD AUTO: 2.65 MILLION/UL (ref 3.81–5.12)
SODIUM SERPL-SCNC: 132 MMOL/L (ref 135–147)
WBC # BLD AUTO: 7.04 THOUSAND/UL (ref 4.31–10.16)

## 2025-02-19 PROCEDURE — 83735 ASSAY OF MAGNESIUM: CPT | Performed by: INTERNAL MEDICINE

## 2025-02-19 PROCEDURE — 85025 COMPLETE CBC W/AUTO DIFF WBC: CPT | Performed by: INTERNAL MEDICINE

## 2025-02-19 PROCEDURE — 96365 THER/PROPH/DIAG IV INF INIT: CPT

## 2025-02-19 PROCEDURE — 80053 COMPREHEN METABOLIC PANEL: CPT | Performed by: INTERNAL MEDICINE

## 2025-02-19 RX ADMIN — MAGNESIUM SULFATE HEPTAHYDRATE: 500 INJECTION, SOLUTION INTRAMUSCULAR; INTRAVENOUS at 12:38

## 2025-02-19 NOTE — PROGRESS NOTES
Outpatient Oncology Nutrition Consultation   Type of Consult: Follow Up   Care Location: [facility]    Reason for referral: Received notification by [facility] PEPE ZAPATA on 8/21/24 that pt has triggered for oncology nutrition care (reason for referral: HNC dx and Malnutrition Screening Tool (MST) Triggers: scored a 0 indicating No recent wt loss and is not eating poorly due to a decreased appetite. 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   -7/31/2023 started on FOLFOX   -nivolumab added to cycle 9   -finished July 2024  Started chemo/RT  9/16/24 for HNC   -Cisplatin   -RT EOT 11/13/24  S/p PEG placement 10/2/24  S/p nasopharyngoscopy, left tonsillectomy 10/9/24  Colon cancer progression; started on FOLFIRI with a 50% dose reduction of the irinotecan 1/8/25  Oncology History Overview Note   2/2019 - pT2N0 breast cancer treated with anastrozole, oncotype 13, ER+ WI+ Her2 neg     7/2023 - metastatic ascending colon adenocarcinoma to liver     Caris - KRAS G12D, CAIN, PD-L1 0%     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, unresectable     7/31/2023 - FOLFOX     11/20/2023 - opdivo added to FOLFOX     12/18/2023-cycle 11-20% dose reduction of 5-FU bolus and oxaliplatin due to increased fatigue     Jan 2024 - oxaliplatin removed from treatment plan due to neuropathy - PET scan shows good disease control in all areas except colon lesion     3/2024 - right hemicolectomy - pT3N0     6/3/24 - cryoablation to liver (no interruption to systemic therapy)     7/30/2024 - stop folfox     9/2024 - 10/2024 - cis/RT to tonsils and cervical Lns    12/2024 - disease progression of colon cancer in the liver    01/2025 - Initiated  "FOLFIRI every 14-days, with 50% dose-reduction of Irinotecan due to pre-existing diarrhea.    02/05/2025 - Cancelled Cycle 3 of FOLFIRI due to significant chemotherapy-induced toxicities (Including: Neutropenia). Will remove 5-Fluorouracil bolus and growth factor support beginning with subsequent cycles of treatment.     Malignant neoplasm of right female breast (HCC)   11/23/2018 Initial Diagnosis    Malignant neoplasm of right female breast (HCC)     11/23/2018 Biopsy    Rt Breast US BX 1100 8cmfn, 4 passes 12g Marquee:  - Invasive breast carcinoma of no special type (ductal NST/invasive ductal carcinoma).  - grade 2  - %  - GA 95%  - HER2 negative     12/20/2018 Surgery    Right partial mastectomy and SLN biopsy:  Infiltrating ductal carcinoma, Grade 2/3, felt to be between 2.0 cm and 2.5 cm in greatest dimension  - No invasive tumor is seen at inked margins, but there is a focus of DCIS seen within approximately 1mm of the inked medial margin  - no LVI  - no LCIS  - 0/2 LN's  at least Stage IIA - pT2, pN0, cM0, G2.    - Dr Coronado     12/20/2018 -  Cancer Staged    Stage IIA - pT2, pN0, cM0, G2.       1/4/2019 Genomic Testing    Oncotype DX score: 13     2/1/2019 -  Hormone Therapy    anastrozole 1 mg daily as adjuvant endocrine therapy    - Dr Daley       2/14/2019 - 4/3/2019 Radiation    Course: C1    Plan ID Energy Fractions Dose per Fraction (cGy) Dose Correction (cGy) Total Dose Delivered (cGy) Elapsed Days   R Breast 10X/6X 25 / 25 200 0 5,000 40   R BRST BOOST 16E 5 / 5 200 0 1,000 7      Treatment dates:  C1: 2/14/2019 - 4/3/2019       Metastatic colon cancer to liver (HCC)   7/6/2023 Genetic Testing    CARIS Results:   KRAS Pathogenic Variant Exon 2  p.G12D : lack of benefit of cetuximab, panitumumab Level 2   No other actionable mutation; MSI stable; TMB low   MiFOLOXAi Results: \"Decreased Benefit of FOLFOX + Bevacizumab in first line metastatic CRC.  This patient may achieve improved results " "by receiving an alternative to FOLFOX, such as FOLFIRI, as their initial regimen. As an adjustment to frontline FOLFOXIRI following toxicity: This patient may achieve improved results by removing the oxaliplatin portion of their regimen\".         7/11/2023 Initial Diagnosis    Metastatic colon cancer to liver (HCC)     7/13/2023 -  Cancer Staged    Staging form: Colon and Rectum, AJCC 8th Edition  - Clinical stage from 7/13/2023: cT3, cN0, pM1 - Signed by Harika Medina DO on 9/28/2023  Total positive nodes: 0       7/31/2023 - 8/1/2024 Chemotherapy    cyanocobalamin, 1,000 mcg, Intramuscular, Every 30 days, 7 of 9 cycles  Administration: 1,000 mcg (5/9/2024), 1,000 mcg (5/24/2024), 1,000 mcg (6/20/2024), 1,000 mcg (7/3/2024), 1,000 mcg (7/18/2024), 1,000 mcg (8/1/2024)  potassium chloride, 20 mEq, Intravenous, Once, 2 of 2 cycles  Administration: 20 mEq (11/6/2023), 20 mEq (11/20/2023)  alteplase (CATHFLO), 2 mg, Intracatheter, Every 1 Minute as needed, 21 of 23 cycles  Administration: 2 mg (1/3/2024)  pegfilgrastim (NEULASTA), 6 mg, Subcutaneous, Once, 12 of 12 cycles  Administration: 6 mg (10/25/2023), 6 mg (11/8/2023), 6 mg (11/22/2023), 6 mg (12/6/2023), 6 mg (12/20/2023), 6 mg (1/12/2024), 6 mg (1/25/2024), 6 mg (4/25/2024), 6 mg (5/9/2024), 6 mg (5/24/2024), 6 mg (6/20/2024)  fluorouracil (ADRUCIL), 895 mg, Intravenous, Once, 21 of 23 cycles  Dose modification: 320 mg/m2 (original dose 400 mg/m2, Cycle 11)  Administration: 900 mg (7/31/2023), 900 mg (8/14/2023), 900 mg (8/28/2023), 900 mg (9/11/2023), 900 mg (9/25/2023), 900 mg (10/9/2023), 900 mg (10/23/2023), 895 mg (11/6/2023), 895 mg (11/20/2023), 895 mg (12/4/2023), 700 mg (12/18/2023), 680 mg (1/10/2024), 680 mg (1/23/2024), 625 mg (4/23/2024), 625 mg (5/7/2024), 625 mg (5/22/2024), 625 mg (6/4/2024), 625 mg (6/18/2024), 625 mg (7/1/2024), 625 mg (7/16/2024), 625 mg (7/30/2024)  nivolumab (OPDIVO) IVPB, 240 mg (100 % of original dose 240 mg), " Intravenous, Once, 13 of 15 cycles  Dose modification: 240 mg (original dose 240 mg, Cycle 9)  Administration: 240 mg (11/20/2023), 240 mg (12/4/2023), 240 mg (12/18/2023), 240 mg (1/10/2024), 240 mg (1/23/2024), 240 mg (4/23/2024), 240 mg (5/7/2024), 240 mg (5/22/2024), 240 mg (6/4/2024), 240 mg (6/18/2024), 240 mg (7/1/2024), 240 mg (7/16/2024), 240 mg (7/30/2024)  leucovorin calcium IVPB, 896 mg, Intravenous, Once, 21 of 23 cycles  Administration: 900 mg (7/31/2023), 900 mg (8/14/2023), 900 mg (8/28/2023), 900 mg (9/11/2023), 900 mg (9/25/2023), 900 mg (10/9/2023), 900 mg (10/23/2023), 900 mg (11/6/2023), 900 mg (11/20/2023), 900 mg (12/4/2023), 900 mg (12/18/2023), 850 mg (1/10/2024), 850 mg (1/23/2024), 800 mg (4/23/2024), 800 mg (5/7/2024), 800 mg (5/22/2024), 800 mg (6/4/2024), 800 mg (6/18/2024), 800 mg (7/1/2024), 800 mg (7/16/2024), 800 mg (7/30/2024)  oxaliplatin (ELOXATIN) chemo infusion, 85 mg/m2 = 190.4 mg, Intravenous, Once, 12 of 12 cycles  Dose modification: 68 mg/m2 (original dose 85 mg/m2, Cycle 11, Reason: Other (Must fill in a comment), Comment: increased fatigue)  Administration: 190.4 mg (7/31/2023), 190.4 mg (8/14/2023), 200 mg (8/28/2023), 200 mg (9/11/2023), 200 mg (9/25/2023), 200 mg (10/9/2023), 200 mg (10/23/2023), 200 mg (11/6/2023), 200 mg (11/20/2023), 190.4 mg (12/4/2023), 150 mg (12/18/2023), 150 mg (1/10/2024)  fluorouracil (ADRUCIL) ambulatory infusion Soln, 1,200 mg/m2/day = 5,375 mg, Intravenous, Over 46 hours, 21 of 23 cycles     9/26/2023 -  Cancer Staged    Staging form: Colon and Rectum, AJCC 8th Edition  - Pathologic: pT3, pN0, cM1 - Signed by Vickie Israel MD on 4/3/2024  Total positive nodes: 0       3/12/2024 Surgery    A. Terminal ileum, appendix, right colon (right hemicolectomy):  - Adenocarcinoma (5.2cm)  - Forty-nine (49) lymph nodes negative for carcinoma (0/49)    - Acellular mucin present in one (1) lymph node   - See staging synoptic (ypT3N0)     1/8/2025 -   Chemotherapy    alteplase (CATHFLO), 2 mg, Intracatheter, Every 1 Minute as needed, 2 of 5 cycles  pegfilgrastim (NEULASTA), 6 mg, Subcutaneous, Once, 0 of 3 cycles  fluorouracil (ADRUCIL), 400 mg/m2 = 770 mg, Intravenous, Once, 2 of 5 cycles  Administration: 770 mg (1/8/2025), 770 mg (1/22/2025)  irinotecan (CAMPTOSAR) chemo infusion, 173 mg (50 % of original dose 180 mg/m2), Intravenous, Once, 2 of 5 cycles  Dose modification: 90 mg/m2 (original dose 180 mg/m2, Cycle 1, Reason: Anticipated Tolerance, Comment: 50% dose reduction due to pre-existing diarrhea)  Administration: 180 mg (1/8/2025), 180 mg (1/22/2025)  leucovorin calcium IVPB, 768 mg, Intravenous, Once, 2 of 5 cycles  Administration: 800 mg (1/8/2025), 800 mg (1/22/2025)  fluorouracil (ADRUCIL) ambulatory infusion Soln, 1,200 mg/m2/day = 4,610 mg, Intravenous, Over 46 hours, 2 of 5 cycles     Right tonsillar squamous cell carcinoma (HCC)   7/27/2023 Initial Diagnosis    Right tonsillar squamous cell carcinoma (HCC)     7/27/2023 -  Cancer Staged    Staging form: Pharynx - Oropharynx, AJCC 8th Edition  - Clinical stage from 7/27/2023: Stage III (cT1, cN3, cM0, p16+) - Signed by Evan Plummer MD on 7/27/2023  Stage prefix: Initial diagnosis       9/16/2024 - 11/13/2024 Radiation      Plan ID Energy Fractions Dose per Fraction (cGy) Dose Correction (cGy) Total Dose Delivered (cGy) Elapsed Days   Head_Neck 6X-FFF 35 / 35 200 0 7,000 58    C2: 9/16/2024 - 11/13/2024 9/17/2024 - 10/21/2024 Chemotherapy    alteplase (CATHFLO), 2 mg, Intracatheter, Every 1 Minute as needed, 6 of 6 cycles  Administration: 2 mg (10/21/2024)  CISplatin (PLATINOL) infusion, 70 mg (original dose 40 mg/m2), Intravenous, Once, 6 of 6 cycles  Dose modification: 70 mg (original dose 40 mg/m2, Cycle 1, Reason: Anticipated Tolerance), 30 mg/m2 (original dose 40 mg/m2, Cycle 4, Reason: Neuropathy, Comment: dose reduction to 30 mg/m2 due to neuropathy)  Administration: 70 mg  (9/17/2024), 70 mg (9/23/2024), 70 mg (9/30/2024), 59.1 mg (10/7/2024), 59.1 mg (10/14/2024), 59.1 mg (10/21/2024)  sodium chloride, 500 mL, Intravenous, Once, 6 of 6 cycles  Administration: 500 mL (9/17/2024), 500 mL (9/17/2024), 500 mL (9/23/2024), 500 mL (9/23/2024), 500 mL (9/30/2024), 500 mL (9/30/2024), 500 mL (10/7/2024), 500 mL (10/7/2024), 500 mL (10/14/2024), 500 mL (10/14/2024), 500 mL (10/21/2024)  fosaprepitant (EMEND) IVPB, 150 mg, Intravenous, Once, 6 of 6 cycles  Administration: 150 mg (9/17/2024), 150 mg (9/23/2024), 150 mg (9/30/2024), 150 mg (10/7/2024), 150 mg (10/14/2024), 150 mg (10/21/2024)  IVPB builder, , Intravenous, Once, 1 of 1 cycle  Administration: Unknown dose (10/21/2024)       Past Medical & Surgical Hx:   Patient Active Problem List   Diagnosis    Primary hypertension    Mixed hyperlipidemia    Malignant neoplasm of right female breast (HCC)    Vitamin D deficiency    Prediabetes    Right thyroid nodule    GERD (gastroesophageal reflux disease)    Obesity    Metastatic colon cancer to liver (HCC)    Right tonsillar squamous cell carcinoma (HCC)    Poor appetite    Nutritional anemia    Dehydration    Drug-induced constipation    Chemotherapy induced neutropenia (HCC)    Stage 3a chronic kidney disease (HCC)    Thrombocytopenia (HCC)    Neuropathy    Diastolic dysfunction    Hypokalemia    Leukemoid reaction    GOGO (acute kidney injury) (HCC)    Acute post-operative pain    At risk for constipation    At risk for delirium    Palliative care encounter    Physical deconditioning    History of CVA (cerebrovascular accident)    Chronic nasal congestion    Elevated LFTs    Vitamin B12 deficiency    Neoplastic malignant related fatigue    Sensation, choking    S/P percutaneous endoscopic gastrostomy (PEG) tube placement (HCC)    Pancytopenia due to chemotherapy (HCC)    Cancer related pain    Hypomagnesemia    Functional diarrhea    Antineoplastic chemotherapy induced anemia     Hypertensive heart and renal disease with congestive heart failure (HCC)     Past Medical History:   Diagnosis Date    Anemia     Arthritis     Body mass index (BMI) 40.0-44.9, adult (HCC) 9/22/2023    Breast cancer (HCC) 12/17/2018    Cancer (HCC)     right breast, colon, liver, right tonsil    Cervical lymphadenopathy 3/21/2023    CT neck on 3/30/2023- Large right level 2A lymphadenopathy as described above and suspicious for neoplasm.  Correlation with histopathology is recommended.  Mild asymmetric prominence of the right palatine tonsil with otherwise no definitive nodular enhancing lesions identified along the course of the aerodigestive tract.     5/26/23- FNA of this node was nonrevealing for tissue etiology, but it d    Encounter for screening for HIV 07/07/2022    Follow-up examination 04/04/2023    GERD (gastroesophageal reflux disease)     Hyperlipidemia     Hypertension     Obesity     Stroke (HCC)     TIA     Stroke (HCC)     TIA     Transaminitis 09/22/2021    Vasomotor rhinitis 8/9/2018    Refilled flonase today. Stopped taking since January 2022     Past Surgical History:   Procedure Laterality Date    BREAST BIOPSY Right 11/23/2018    us guided bx cancer    BREAST SURGERY      COLONOSCOPY      ESOPHAGOSCOPY N/A 07/20/2023    Procedure: ESOPHAGOSCOPY;  Surgeon: David Chapa MD;  [facility];  Service: ENT    HEMICOLOECTOMY W/ ANASTOMOSIS Right 3/12/2024    Procedure: RIGHT COLECTOMY WITH ROBOTICS;  Surgeon: Vickie Israel MD;  [facility];  Service: Surgical Oncology    IR BIOPSY LIVER MASS  06/27/2023    IR CRYOABLATION  6/3/2024    IR GASTROSTOMY TUBE PLACEMENT  10/2/2024    IR PORT CHECK  01/03/2024    IR PORT PLACEMENT  07/28/2023    IR PORT STRIPPING  01/09/2024    LAPAROTOMY N/A 3/12/2024    Procedure: LAPAROTOMY EXPLORATORY W/ ROBOTICS;  Surgeon: Vickie Israel MD;  [facility];  Service: Surgical Oncology    FL BIOPSY NASOPHARYNX VISIBLE LESION SIMPLE N/A  10/9/2024    Procedure: NASOPHARYNGOSCOPY with biospy;  Surgeon: Juanito Matthew MD;  [facility];  Service: ENT    MN BRNCC INCL FLUOR GDNCE DX W/CELL WASHG SPX N/A 2023    Procedure: BRONCHOSCOPY;  Surgeon: David Chapa MD;  [facility];  Service: ENT    MN LARYNGOSCOPY W/BIOPSY MICROSCOPE/TELESCOPE N/A 2023    Procedure: MICRODIRECT LARYNGOSCOPY WITH BIOPSY;  Surgeon: David Chapa MD;  [facility];  Service: ENT    MN LARYNGOSCOPY W/WO TRACHEOSCOPY DX EXCEPT  N/A 10/9/2024    Procedure: DIRECT LARYNGOSCOPY;  Surgeon: Juanito Matthew MD;  [facility];  Service: ENT    MN MASTECTOMY PARTIAL W/AXILLARY LYMPHADENECTOMY Right 2018    Procedure: ULTRASOUND LOCALIZED PARTIAL MASTECTOMY W/SENTINEL NODE BIOPSY POSS AXILLARY DISSECTION;  Surgeon: Zahida Coronado MD;  Location: [facility];  Service: General    MN TONSILLECTOMY PRIMARY/SECONDARY AGE 12/> N/A 10/9/2024    Procedure: TONSILLECTOMY;  Surgeon: Juanito Matthew MD;  [facility];  Service: ENT    TUBAL LIGATION      US GUIDED BREAST BIOPSY RIGHT COMPLETE Right 2018    US GUIDED INJECTION FOR RESEARCH STUDY  2018    US GUIDED INJECTION FOR RESEARCH STUDY  2018    US GUIDED INJECTION FOR RESEARCH STUDY  2019    US GUIDED LYMPH NODE BIOPSY RIGHT  2023       Review of Medications:   Vitamins, Supplements and Herbals: No, pt denies taking supplements    Current Outpatient Medications:     acetaminophen (TYLENOL) 160 mg/5 mL liquid, Take 20 mL (640 mg total) by mouth every 6 (six) hours as needed for mild pain for up to 10 days, Disp: 473 mL, Rfl: 0    anastrozole (ARIMIDEX) 1 mg tablet, Take 1 tablet (1 mg total) by mouth daily, Disp: 90 tablet, Rfl: 3    atorvastatin (LIPITOR) 40 mg tablet, Take 1 tablet (40 mg total) by mouth daily at bedtime, Disp: 30 tablet, Rfl: 2    cholestyramine (QUESTRAN) 4 g packet, Take 1 packet (4 g total) by mouth 3  (three) times a day with meals, Disp: 90 packet, Rfl: 3    diphenhydramine, lidocaine, Al/Mg hydroxide, simethicone (Magic Mouthwash) SUSP, Swish and swallow 10 mL every 4 (four) hours as needed for mouth pain or discomfort (Patient not taking: Reported on 1/31/2025), Disp: 480 mL, Rfl: 2    docusate sodium (COLACE) 50 mg capsule, Take 1 capsule (50 mg total) by mouth 2 (two) times a day, Disp: 90 capsule, Rfl: 1    ergocalciferol (VITAMIN D2) 50,000 units, Take 1 capsule (50,000 Units total) by mouth once a week, Disp: 90 capsule, Rfl: 3    fluconazole (DIFLUCAN) 100 mg tablet, Take 2 tablets (200 mg total) by mouth daily for 1 day, THEN 1 tablet (100 mg total) daily for 9 days., Disp: 11 tablet, Rfl: 0    fluorouracil 4,610 mg in CADD/Elastomeric Infusion Device, Infuse 4,610 mg (1,200 mg/m2/day x 1.92 m2) into a catheter in a vein via infusion device over 46 hours for 2 days  Infusion planned for February 19, 2025., Disp: 1 each, Rfl: 0    folic acid (FOLVITE) 1 mg tablet, Take 1 tablet (1 mg total) by mouth in the morning, Disp: 90 tablet, Rfl: 3    gabapentin (NEURONTIN) 300 mg capsule, Take 1 pills in the morning, 1 pill at lunch and 2 pills before bed, Disp: 120 capsule, Rfl: 0    hydrocortisone 1 % ointment, , Disp: , Rfl:     ibuprofen (MOTRIN) 100 mg/5 mL suspension, Take 20 mL (400 mg total) by mouth every 6 (six) hours as needed for moderate pain (Patient not taking: Reported on 1/31/2025), Disp: 473 mL, Rfl: 0    lidocaine-prilocaine (EMLA) cream, Apply topically as needed for mild pain (Patient not taking: Reported on 12/6/2024), Disp: 30 g, Rfl: 3    losartan (COZAAR) 50 mg tablet, Take 1 tablet (50 mg total) by mouth daily, Disp: 90 tablet, Rfl: 5    magnesium Oxide (MAG-OX) 400 mg TABS, 1 tablet (400 mg total) by Per G Tube route 2 (two) times a day (Patient not taking: Reported on 1/31/2025), Disp: 60 tablet, Rfl: 0    mirtazapine (REMERON) 15 mg tablet, Take 1 tablet (15 mg total) by mouth daily  at bedtime Patient is also on a 30 mg tablet, for a total dose of 45 mg, Disp: 30 tablet, Rfl: 0    mirtazapine (REMERON) 30 mg tablet, Take 1 tablet (30 mg total) by mouth daily at bedtime, Disp: 30 tablet, Rfl: 0    nystatin (MYCOSTATIN) 500,000 units/5 mL suspension, Apply 5 mL (500,000 Units total) to the mouth or throat 4 (four) times a day, Disp: 600 mL, Rfl: 3    omeprazole (PriLOSEC) 20 mg delayed release capsule, Take 1 capsule (20 mg total) by mouth daily, Disp: 30 capsule, Rfl: 5    ondansetron (ZOFRAN) 8 mg tablet, Take 1 tablet (8 mg total) by mouth every 8 (eight) hours as needed for nausea or vomiting, Disp: 90 tablet, Rfl: 2    oxyCODONE (ROXICODONE) 5 mg/5 mL solution, Take 5 mL (5 mg total) by mouth every 6 (six) hours as needed for severe pain ((breakthrough pain)) Max Daily Amount: 20 mg, Disp: 473 mL, Rfl: 0    potassium chloride (Klor-Con M20) 20 mEq tablet, Take 1 tablet (20 mEq total) by mouth 2 (two) times a day (Patient not taking: Reported on 1/31/2025), Disp: 90 tablet, Rfl: 1    potassium chloride 10% oral solution, 15 mL (20 mEq total) by Per G Tube route 2 (two) times a day, Disp: 473 mL, Rfl: 0    prochlorperazine (COMPAZINE) 10 mg tablet, Take 1 tablet (10 mg total) by mouth every 6 (six) hours as needed for nausea or vomiting, Disp: 90 tablet, Rfl: 2    pyridoxine (VITAMIN B6) 50 mg tablet, Take 1 tablet (50 mg total) by mouth daily, Disp: 90 tablet, Rfl: 3    vitamin B-12 (VITAMIN B-12) 1,000 mcg tablet, , Disp: , Rfl:   No current facility-administered medications for this visit.    Facility-Administered Medications Ordered in Other Visits:     magnesium sulfate 2 g in sodium chloride 0.9 % 1,000 mL IV, , Intravenous, Once PRN, Harika Medina, , Last Rate: 1,000 mL/hr at 02/19/25 1238, New Bag at 02/19/25 1238    Most Recent Lab Results:   Lab Results   Component Value Date    WBC 12.08 (H) 02/17/2025    NEUTROABS 10.17 (H) 02/17/2025    IRON 60 11/01/2024    TIBC 177 (L)  "11/01/2024    FERRITIN 533 (H) 11/01/2024    CHOLESTEROL 167 04/24/2024    TRIG 137 04/24/2024    HDL 43 (L) 04/24/2024    LDLCALC 97 04/24/2024    ALT 11 02/17/2025    AST 29 02/17/2025    ALB 3.2 (L) 02/17/2025    SODIUM 133 (L) 02/17/2025    SODIUM 136 02/14/2025    K 4.5 02/17/2025    K 4.3 02/14/2025    CL 99 02/17/2025    BUN 16 02/17/2025    BUN 20 02/14/2025    CREATININE 1.02 02/17/2025    CREATININE 1.02 02/14/2025    EGFR 56 02/17/2025    PHOS 2.8 11/01/2024    PHOS 3.3 10/30/2024    TSH 1.58 01/03/2022    GLUCOSE 209 (H) 03/12/2024    POCGLU 89 12/27/2024    GLUF 102 (H) 10/30/2024    GLUF 95 09/13/2024    GLUC 107 02/17/2025    HGBA1C 5.5 02/21/2024    HGBA1C 5.9 (H) 06/13/2023    HGBA1C 6.1 (H) 07/09/2022    CALCIUM 9.2 02/17/2025    FOLATE >20.0 (H) 05/23/2019    MG 1.8 (L) 02/17/2025       Anthropometric Measurements:   Height: 66\"  Ht Readings from Last 1 Encounters:   02/18/25 5' 5.98\" (1.676 m)     Wt Readings from Last 20 Encounters:   02/18/25 73 kg (161 lb)   02/04/25 73.4 kg (161 lb 12.8 oz)   01/31/25 75.1 kg (165 lb 9.6 oz)   01/30/25 75.6 kg (166 lb 10.7 oz)   01/22/25 75.6 kg (166 lb 10.7 oz)   01/10/25 77.1 kg (170 lb)   01/08/25 76.5 kg (168 lb 10.4 oz)   12/31/24 82.6 kg (182 lb)   12/20/24 82.1 kg (181 lb)   12/19/24 84.4 kg (186 lb)   12/06/24 84.4 kg (186 lb)   11/26/24 82.2 kg (181 lb 4 oz)   10/29/24 86.2 kg (190 lb)   10/24/24 86.6 kg (190 lb 14.7 oz)   10/21/24 86.6 kg (190 lb 14.7 oz)   10/15/24 88.5 kg (195 lb)   10/14/24 87.1 kg (192 lb)   10/10/24 89.3 kg (196 lb 13.9 oz)   10/09/24 89.4 kg (197 lb 1.5 oz)   10/07/24 87 kg (191 lb 12.8 oz)       Weight History:   Usual Weight: 275#  Varian: (2/22/19) 264.6#, (4/2/19) 263.4, (9/16/24) 197.4#, (9/23/24) 194#, (9/30/24) 192.8#, (10/7/24) 190.2#, (10/11/24) 194.6#, (10/14/24) 191#, (10/17/24) 192.8#, (10/21/24) 190#, (10/24/24) 190#, (10/28/24) 189#, (11/11/24) 185#  Home Scale: n/a    Oncology Nutrition-Anthropometrics  "     Flowsheet Row Nutrition from 2/19/2025 in [facility] Nutrition from 1/17/2025 in [facility]   Patient [age] (years): 70 years 69 years   Patient (female) height (in): 66 in 66 in   Current Weight to be used for anthropometric calculations (lbs) 161 lbs 170 lbs   Current Weight to be used for anthropometric calculations (kg) 73.2 kg 77.3 kg   BMI: 26 27.4   IBW female: 130 lbs 130 lbs   IBW (kg) female: 59.1 kg 59.1 kg   IBW % (female) 123.8 % 130.8 %   Adjusted BW (female): 137.8 lbs 140 lbs   Adjusted BW kg (female): 62.6 kg 63.6 kg   % weight change after 1 month: -3.4 % -8.6 %   Weight change after 1 month (lbs) -5.7 lbs -16 lbs   % weight change after 3 months: -11.2 % -13.7 %   Weight change after 3 months (lbs) -20.3 lbs -26.9 lbs            Nutrition-Focused Physical Findings: none observed    Food/Nutrition-Related History & Client/Social History:    Current Nutrition Impact Symptoms:  [] Nausea  [x] Reduced Appetite  [] Acid Reflux    [] Vomiting  [x] Unintended Wt Loss stable x2 weeks [] Malabsorption    [x] Diarrhea -improving but still occurs after feeding [] Unintended Wt Gain  [] Dumping Syndrome    [] Constipation  [] Thick Mucous/Secretions  [] Abdominal Pain    [] Dysgeusia (Altered Taste)  [x] Xerostomia (Dry Mouth)  [] Gas    [] Dysosmia (Altered Smell)  [] Thrush  [] Difficulty Chewing    [] Oral Mucositis (Sore Mouth)  [x] Fatigue  [] Hyperglycemia   Lab Results   Component Value Date    HGBA1C 5.5 02/21/2024      [x] Odynophagia  [] Esophagitis  [] Other:    [x] Dysphagia -enteral nutrition as 1' source of nutrition  NPO per SLP on 2/6, trial nectar thick trials  [] Early Satiety  [] No Problems Eating      Food Allergies & Intolerances: no    Current Diet: Tube Feeding  Current Nutrition Intake: Increased since last visit  Appetite: Fair   Nutrition Route: PEG as primary source  Oral Care: brushes daily  Activity  level: ADLs.     24 Hr Diet Recall:   Little bites of yogurt / pudding.    Beverages: water (sips throughout the day- thickener packets)  Supplements:   Ensure Complete (10 oz, 350 kcal, 30 g pro) occasionally via tube    EN Recall:  DME: [employer]  Access Type: PEG  Formula: [employer] Peptide 1.5  Method: Bolus  Regimen: 11oz (325 mL) 3 times per day of [employer] 1.5 via PEG (gravity syringe method to administer boluses; 1 container infusing over ~15-20 minutes).  Flush: 60 mL free water flush  pre/post each bolus (120 mL x 2 boluses = 240 mL)  EN providin mL volume (3 cartons/day), 1,500 kcal (65% est needs), 72g protein (77% est needs), 682 mL free water, 1042 mL total water (45% est needs).  Pt also flushing 1-2 syringes every few hours for added hydration. Pt gets IVF 3x/week.       Oncology Nutrition-Estimated Needs      Flowsheet Row Nutrition from 2025 in [facility] Nutrition from 2024 in [facility]   Weight type used Actual weight Adjusted weight   Weight in kilograms (kg) used for estimated needs 77.3 kg 66.4 kg   Energy needs formula:  30-35 kcal/kg 30-35 kcal/kg   Energy needs based on 30 kcal/k kcal  kcal   Energy needs based on 35 kcal/k kcal 2323 kcal   Protein needs formula: 1.2-1.5 g/kg 1.2-1.5 g/kg   Protein needs based on 1.2 g/k g 80 g   Protein needs based on 1.5 g/kg 116 g 100 g   Fluid needs formula: 30-35 mL/kg 30-35 mL/kg   Fluid needs based on 30 mL/kg 2319 mL 1992 mL   Fluid needs in ounces 78 oz 67 oz   Fluid needs based on 35 mL/kg 2706 mL 2324 mL   Fluid needs in ounces 91 oz 79 oz             Discussion & Intervention:   Maira was evaluated today for an RD follow up regarding HNC.  Maira has completed tx for HNC and is currently undergoing tx for metastatic colon cancer .  Spoke with Maira in the infusion center today while there for hydration. She reports  her energy has improved and symptoms of diarrhea are better. She is not getting chemotherapy this week and just getting hydration. She has been working with SLP who recommended NPO but and has had small improvement with thickened liquids. She eats very little at home. She has been using thickener packets which she feels has helped. She has increased her tube feeding to 3x/day. She reports diarrhea is better, but still occurs about 20-60 minutes after feeding. She takes cholestyramine once daily and she feels this helps. She states more than 1x/day causes her bowels too be too hard/thick and worries about constipation. She would like to try banana flakes. Discussed starting at once per day and we can increase to 3x/day as needed/tolerated. I will order through [employer].    Reviewed 24 hour recall, which revealed an inadequate enteral intake, and discussed ways to optimize nutrient intake.  Also reviewed the importance of wt management throughout the tx process and the role of enteral nutrition in managing wt and overall health.  Based on today's assessment, discussion included: MNT for:   fluid, pureed diet,  enteral nutrition, practicing proper oral care, adequate hydration & fluid choices, utilizing oral nutrition supplements, practicing proper feeding tube use & care, adherence to and compliance with enteral nutrition plan of care, and individualized enteral nutrition recommendations & plan:   .   Moving forward, Maira was encouraged to continue current enteral nutrition plan of care   Materials Provided: Ensure samples and [employer] samples  All questions and concerns addressed during today’s visit.  Maira has RD contact information.    Nutrition Diagnosis:   Increased Nutrient Needs (kcal & pro) related to increased demand for nutrients and disease state as evidenced by cancer dx and pt undergoing tx for cancer.  Swallowing Difficulty related to diagnosis/treatment related causes as evidenced by  need  for feeding tube, diet restriction per SLP.  Monitoring & Evaluation:   Goals:  weight maintenance/stabilization  adequate nutrition impact symptom management  pt to meet >/=75% estimated nutrition needs daily  achieve tube feeding goal rate    Progress Towards Goals: Progressing    Nutrition Rx & Recommendations:  Diet: enteral nutrition as 1' source of nutrition  Stay hydrated by sipping fluids of choice/tolerance throughout the day. Thickened liquids  Follow proper oral care; Try baking soda/salt water rinse recipe (mix 3/4 tsp salt + 1 tsp baking soda + 1 qt water; rinse with plain water after using) in Eating Hints book (pg 18).  Brush your teeth before/after meals & before bed.  Weigh yourself regularly. If you notice weight loss, make an effort to increase your daily food/calorie intake. If you continue to notice loss after these efforts, reach out to your dietitian to establish a plan to stabilize weight.   Continue with Ensure Plus/Complete via tube once per day or PRN.   TF plan - adjust to peptide based formula in light of worsening diarrhea, weight loss, intolerance to standard formula  TF Goal:  Continue [employer] 1.5, 3 times per day via PEG, increasing to 4 cartons per day as tolerated   Water Flushin-120 mL free water flush pre/post each bolus (480 mL water) + additional 120 mL water flushes 4 times daily (480 mL water); adjusting prn on IV hydration days  Goal TF provides: 1300 mL total volume (4 cartons), 2000 kcal, 96g protein, 910 mL free water, 1870 mL total fluid   Contact RD for substitutions  Add 1 packet Banatrol TID after each feeding to assist with loose stools.   Call [facility] when you have 10 days left of TF supplies: [phone], option 3 (customer support).    Follow Up Plan:  Will call upon delivery of Banatrol; will then need f/u 2-3 weeks with Elvie Huynh RD  Recommend Referral to Other Providers: none at this time

## 2025-02-19 NOTE — PATIENT INSTRUCTIONS
Nutrition Rx & Recommendations:  Diet: enteral nutrition as 1' source of nutrition  Stay hydrated by sipping fluids of choice/tolerance throughout the day. Thickened liquids  Follow proper oral care; Try baking soda/salt water rinse recipe (mix 3/4 tsp salt + 1 tsp baking soda + 1 qt water; rinse with plain water after using) in Eating Hints book (pg 18).  Brush your teeth before/after meals & before bed.  Weigh yourself regularly. If you notice weight loss, make an effort to increase your daily food/calorie intake. If you continue to notice loss after these efforts, reach out to your dietitian to establish a plan to stabilize weight.   Continue with Ensure Plus/Complete via tube once per day or PRN.   TF plan - adjust to peptide based formula in light of worsening diarrhea, weight loss, intolerance to standard formula  TF Goal:  Continue [employer] 1.5, 3 times per day via PEG, increasing to 4 cartons per day as tolerated   Water Flushin-120 mL free water flush pre/post each bolus (480 mL water) + additional 120 mL water flushes 4 times daily (480 mL water); adjusting prn on IV hydration days  Goal TF provides: 1300 mL total volume (4 cartons), 2000 kcal, 96g protein, 910 mL free water, 1870 mL total fluid   Contact RD for substitutions  Add 1 packet Banatrol TID after each feeding to assist with loose stools.   Call [facility] when you have 10 days left of TF supplies: [phone], option 3 (customer support).    Follow Up Plan: Will call upon delivery of Banatrol; will then need f/u 2-3 weeks with Elvie Huynh RD  Recommend Referral to Other Providers: none at this time

## 2025-02-19 NOTE — PROGRESS NOTES
Patient arrived to unit without complaint. Patient tolerated IV hydration with Magnesium infusion without incident. AVS provided and patient aware of next appointment on 2/21/25 at 11:30. Patient left in stable condition.

## 2025-02-20 ENCOUNTER — OFFICE VISIT (OUTPATIENT)
Dept: SPEECH THERAPY | Facility: CLINIC | Age: 70
End: 2025-02-20
Payer: MEDICARE

## 2025-02-20 DIAGNOSIS — R13.12 DYSPHAGIA, OROPHARYNGEAL PHASE: Primary | ICD-10-CM

## 2025-02-20 DIAGNOSIS — C09.9 RIGHT TONSILLAR SQUAMOUS CELL CARCINOMA (HCC): ICD-10-CM

## 2025-02-20 DIAGNOSIS — Z90.89 STATUS POST TONSILLECTOMY: ICD-10-CM

## 2025-02-20 PROCEDURE — 92526 ORAL FUNCTION THERAPY: CPT | Performed by: SPEECH-LANGUAGE PATHOLOGIST

## 2025-02-20 RX ORDER — MIRTAZAPINE 15 MG/1
15 TABLET, FILM COATED ORAL
Qty: 30 TABLET | Refills: 0 | Status: SHIPPED | OUTPATIENT
Start: 2025-02-20

## 2025-02-20 RX ORDER — GABAPENTIN 300 MG/1
CAPSULE ORAL
Qty: 120 CAPSULE | Refills: 0 | Status: SHIPPED | OUTPATIENT
Start: 2025-02-20

## 2025-02-20 RX ORDER — ANASTROZOLE 1 MG/1
1 TABLET ORAL DAILY
Qty: 90 TABLET | Refills: 0 | Status: SHIPPED | OUTPATIENT
Start: 2025-02-20

## 2025-02-20 RX ORDER — MIRTAZAPINE 30 MG/1
30 TABLET, FILM COATED ORAL
Qty: 30 TABLET | Refills: 0 | Status: SHIPPED | OUTPATIENT
Start: 2025-02-20

## 2025-02-20 RX ORDER — ONDANSETRON 8 MG/1
8 TABLET, FILM COATED ORAL EVERY 8 HOURS PRN
Qty: 90 TABLET | Refills: 0 | Status: SHIPPED | OUTPATIENT
Start: 2025-02-20

## 2025-02-20 RX ORDER — PROCHLORPERAZINE MALEATE 10 MG
10 TABLET ORAL EVERY 6 HOURS PRN
Qty: 90 TABLET | Refills: 0 | Status: SHIPPED | OUTPATIENT
Start: 2025-02-20

## 2025-02-21 ENCOUNTER — TELEPHONE (OUTPATIENT)
Dept: NUTRITION | Facility: HOSPITAL | Age: 70
End: 2025-02-21

## 2025-02-21 ENCOUNTER — HOSPITAL ENCOUNTER (OUTPATIENT)
Dept: INFUSION CENTER | Facility: CLINIC | Age: 70
End: 2025-02-21
Payer: MEDICARE

## 2025-02-21 VITALS
RESPIRATION RATE: 16 BRPM | DIASTOLIC BLOOD PRESSURE: 75 MMHG | TEMPERATURE: 98.5 F | HEART RATE: 111 BPM | SYSTOLIC BLOOD PRESSURE: 108 MMHG

## 2025-02-21 DIAGNOSIS — Z17.0 MALIGNANT NEOPLASM OF UPPER-OUTER QUADRANT OF RIGHT BREAST IN FEMALE, ESTROGEN RECEPTOR POSITIVE (HCC): Primary | ICD-10-CM

## 2025-02-21 DIAGNOSIS — C50.411 MALIGNANT NEOPLASM OF UPPER-OUTER QUADRANT OF RIGHT BREAST IN FEMALE, ESTROGEN RECEPTOR POSITIVE (HCC): Primary | ICD-10-CM

## 2025-02-21 LAB
ALBUMIN SERPL BCG-MCNC: 3.2 G/DL (ref 3.5–5)
ALP SERPL-CCNC: 123 U/L (ref 34–104)
ALT SERPL W P-5'-P-CCNC: 13 U/L (ref 7–52)
ANION GAP SERPL CALCULATED.3IONS-SCNC: 6 MMOL/L (ref 4–13)
AST SERPL W P-5'-P-CCNC: 33 U/L (ref 13–39)
BASOPHILS # BLD AUTO: 0.04 THOUSANDS/ΜL (ref 0–0.1)
BASOPHILS NFR BLD AUTO: 1 % (ref 0–1)
BILIRUB SERPL-MCNC: 0.32 MG/DL (ref 0.2–1)
BUN SERPL-MCNC: 20 MG/DL (ref 5–25)
CALCIUM ALBUM COR SERPL-MCNC: 9.6 MG/DL (ref 8.3–10.1)
CALCIUM SERPL-MCNC: 9 MG/DL (ref 8.4–10.2)
CHLORIDE SERPL-SCNC: 100 MMOL/L (ref 96–108)
CO2 SERPL-SCNC: 28 MMOL/L (ref 21–32)
CREAT SERPL-MCNC: 1.12 MG/DL (ref 0.6–1.3)
EOSINOPHIL # BLD AUTO: 0.01 THOUSAND/ΜL (ref 0–0.61)
EOSINOPHIL NFR BLD AUTO: 0 % (ref 0–6)
ERYTHROCYTE [DISTWIDTH] IN BLOOD BY AUTOMATED COUNT: 16.3 % (ref 11.6–15.1)
GFR SERPL CREATININE-BSD FRML MDRD: 50 ML/MIN/1.73SQ M
GLUCOSE SERPL-MCNC: 108 MG/DL (ref 65–140)
HCT VFR BLD AUTO: 24.3 % (ref 34.8–46.1)
HGB BLD-MCNC: 7.7 G/DL (ref 11.5–15.4)
IMM GRANULOCYTES # BLD AUTO: 0.04 THOUSAND/UL (ref 0–0.2)
IMM GRANULOCYTES NFR BLD AUTO: 1 % (ref 0–2)
LYMPHOCYTES # BLD AUTO: 0.69 THOUSANDS/ΜL (ref 0.6–4.47)
LYMPHOCYTES NFR BLD AUTO: 10 % (ref 14–44)
MAGNESIUM SERPL-MCNC: 2.1 MG/DL (ref 1.9–2.7)
MCH RBC QN AUTO: 30 PG (ref 26.8–34.3)
MCHC RBC AUTO-ENTMCNC: 31.7 G/DL (ref 31.4–37.4)
MCV RBC AUTO: 95 FL (ref 82–98)
MONOCYTES # BLD AUTO: 0.62 THOUSAND/ΜL (ref 0.17–1.22)
MONOCYTES NFR BLD AUTO: 9 % (ref 4–12)
NEUTROPHILS # BLD AUTO: 5.29 THOUSANDS/ΜL (ref 1.85–7.62)
NEUTS SEG NFR BLD AUTO: 79 % (ref 43–75)
NRBC BLD AUTO-RTO: 0 /100 WBCS
PLATELET # BLD AUTO: 339 THOUSANDS/UL (ref 149–390)
PMV BLD AUTO: 9.6 FL (ref 8.9–12.7)
POTASSIUM SERPL-SCNC: 4.5 MMOL/L (ref 3.5–5.3)
PROT SERPL-MCNC: 7.5 G/DL (ref 6.4–8.4)
RBC # BLD AUTO: 2.57 MILLION/UL (ref 3.81–5.12)
SODIUM SERPL-SCNC: 134 MMOL/L (ref 135–147)
WBC # BLD AUTO: 6.69 THOUSAND/UL (ref 4.31–10.16)

## 2025-02-21 PROCEDURE — 96360 HYDRATION IV INFUSION INIT: CPT

## 2025-02-21 PROCEDURE — 85025 COMPLETE CBC W/AUTO DIFF WBC: CPT | Performed by: INTERNAL MEDICINE

## 2025-02-21 PROCEDURE — 83735 ASSAY OF MAGNESIUM: CPT | Performed by: INTERNAL MEDICINE

## 2025-02-21 PROCEDURE — 80053 COMPREHEN METABOLIC PANEL: CPT | Performed by: INTERNAL MEDICINE

## 2025-02-21 RX ADMIN — SODIUM CHLORIDE 1000 ML: 0.9 INJECTION, SOLUTION INTRAVENOUS at 12:59

## 2025-02-21 NOTE — PLAN OF CARE
Problem: INFECTION - ADULT  Goal: Absence or prevention of progression during hospitalization  Description: INTERVENTIONS:  - Assess and monitor for signs and symptoms of infection  - Monitor lab/diagnostic results  - Monitor all insertion sites, i.e. indwelling lines, tubes, and drains  - Monitor endotracheal if appropriate and nasal secretions for changes in amount and color  - [place] appropriate cooling/warming therapies per order  - Administer medications as ordered  - Instruct and encourage patient and family to use good hand hygiene technique  - Identify and instruct in appropriate isolation precautions for identified infection/condition  2/21/2025 1300 by Enedina Brown RN  Outcome: Progressing  2/21/2025 1300 by Enedina Brown RN  Outcome: Progressing  Goal: Absence of fever/infection during neutropenic period  Description: INTERVENTIONS:  - Monitor WBC    2/21/2025 1300 by Enedina Brown RN  Outcome: Progressing  2/21/2025 1300 by Enedina Brown RN  Outcome: Progressing     Problem: Knowledge Deficit  Goal: Patient/family/caregiver demonstrates understanding of disease process, treatment plan, medications, and discharge instructions  Description: Complete learning assessment and assess knowledge base.  Interventions:  - Provide teaching at level of understanding  - Provide teaching via preferred learning methods  2/21/2025 1300 by Enedina Brown RN  Outcome: Progressing  2/21/2025 1300 by Enedina Brown RN  Outcome: Progressing

## 2025-02-21 NOTE — PLAN OF CARE
Problem: INFECTION - ADULT  Goal: Absence or prevention of progression during hospitalization  Description: INTERVENTIONS:  - Assess and monitor for signs and symptoms of infection  - Monitor lab/diagnostic results  - Monitor all insertion sites, i.e. indwelling lines, tubes, and drains  - Monitor endotracheal if appropriate and nasal secretions for changes in amount and color  - Woody appropriate cooling/warming therapies per order  - Administer medications as ordered  - Instruct and encourage patient and family to use good hand hygiene technique  - Identify and instruct in appropriate isolation precautions for identified infection/condition  Outcome: Progressing  Goal: Absence of fever/infection during neutropenic period  Description: INTERVENTIONS:  - Monitor WBC    Outcome: Progressing     Problem: Knowledge Deficit  Goal: Patient/family/caregiver demonstrates understanding of disease process, treatment plan, medications, and discharge instructions  Description: Complete learning assessment and assess knowledge base.  Interventions:  - Provide teaching at level of understanding  - Provide teaching via preferred learning methods  Outcome: Progressing

## 2025-02-21 NOTE — TELEPHONE ENCOUNTER
Contacted pt to let her know [employer] trying to get in touch with her to schedule TF delivery. Pts son answered phone and states pt is unavailable right now. I left him with [employer]'s phone number [phone] (option 6).

## 2025-02-21 NOTE — PROGRESS NOTES
Pt. Denied new symptoms or concerns today. Labs obtained as ordered. NSS 1000ml ordered today. No magnesium needed. Mg from 2/19/25 was 2.1.

## 2025-02-23 NOTE — PROGRESS NOTES
Speech-Language Pathology Re-Evaluation    Today's date: 2025 ***  Patient’s name: Maira Moura  : 1955  MRN: [id number]  Safety measures: HTN, CVA, GERD, active CA, s/p PEG tube placement, aspiration precautions  Current recommended diet: PEG tube for primary nutrition/hydration; puree with thin liquids P.O. as tolerated ***  Referring provider: Harika Medina DO Encounter Diagnosis     ICD-10-CM    1. Dysphagia, oropharyngeal phase  R13.12       2. Status post tonsillectomy  Z90.89       3. 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 Based upon the trials administered today (puree and thin liquids), increased time was required for cohesive bolus formation and AP transfer of puree. Swallow initiation appeared to be prompt. No anterior loss of any food/liquid bolus. Reduced hyolaryngeal elevation and excursion were noted upon palpation. Patient with c/o mild globus sensation on both consistencies. Patient utilized multiple swallows, which she reported helped to clear the globus sensation on thin liquid. Patient utilized liquid wash and multiple swallows, which she reported helped to clear the globus sensation on puree. Vocal quality remained at baseline status throughout assessment. No overt s/sx of penetration/aspiration noted on this date of service. Odynophagia is a significant limiting factor with P.O. intake, as well as xerostomia, dysgeusia, and reduced appetite. Patient is s/p PEG tube placement and is taking, at most, taking 6 cans of TF formula/day per report. Patient noted that it upsets her stomach. Patient indicated that she is losing weight. Patient would benefit from a follow-up with Nutrition to discuss. Also, to note, a white coating was visualized near patient's base of tongue (?thrush). Recommend that patient follow-up with her physician for further evaluation. Patient  would benefit from outpatient skilled Speech Therapy services for further education/training on safe swallowing strategies & aspiration precautions, for continued assessment of patient's tolerance of the safest, least restrictive diet, for therapeutic exercises, to facilitate overall improved quality of life, and for instruction on HEP. It is recommended that patient receive a videofluoroscopic swallow study (VFSS) to assess her current swallowing function to r/o penetration or aspiration and to determine the safest, least restrictive diet.      -Safety concerns: Risk for aspiration, Risk for choking, and Risk for inadequate nutrition/ hydration    -Risk factors: History of Head and Neck Cancer, Complex medical history, and GERD      SWALLOWING SAFETY PRECAUTIONS: ***  PEG tube for primary nutrition/hydration    -Recommended solids: Puree    -Recommended liquids: Thin    -Recommended medication form: Crushed with puree or or via PEG (*consult with physician to discuss if medication form can be altered)    -Frequent/thorough oral care    -Aspiration precautions  *Monitor for signs/symptoms concerning for aspiration (e.g., low grade fever, increase in WBC, change in chest x-ray, increased congestion, increased coughing with P.O. intake)    -Reflux precautions    -Strategies: Small sips and bites when eating, Slow rate, swallow between bites, and Alternate liquids and solids    -Positioning: Upright position during meals and Upright position at least 30 minutes after P.O. intake    -Compensatory strategies: Effortful swallow and Multiple swallows    -Supervision: Independent     -Referrals: Videofluroscopic Swallow Study and Nutritionist (Patient stated that her RD is out on maternity leave. Patient reported that she plans on scheduling an appointment with Nutrition the next time she visits the office for an infusion.)      Short-term goals:  Patient will receive a videofluoroscopic swallow study (VFSS) to assess her  current swallowing function to r/o penetration or aspiration, to determine the safest, least restrictive diet, and to recommended the appropriate rehabilitation exercises (to be achieved in 2-3 weeks). -- {NOTE:41518} MET    Patient will be educated on safe swallowing compensatory strategies (e.g., upright posture, small bites/sips, slow rate, alternation of consistencies, multiple swallows, effortful swallow, etc.) to facilitate increased safety with P.O. intake (to be achieved in 4-6 weeks). -- {NOTE:41393} MET    Patient will tolerate P.O. trials of her recommended diet with the implementation of safe swallowing strategies without overt s/sx of penetration and/or aspiration during skilled therapy sessions in this certification period (to be achieved in 4-6 weeks). -- {NOTE:14544} MET    Patient will perform oral motor exercises using IOPI device on lingual muscles to facilitate increased function and strength by 25% for improved swallowing function (to be achieved in 6-8 weeks). -- {NOTE:57381} MET    Patient will independently complete pharyngeal strengthening exercises (e.g., Effortful swallow, Mendelsohn, Evelia) daily to facilitate increased pharyngeal strength and coordination (to be achieved in 4-6 weeks). -- {NOTE:37352} MET    Long-term goals:  Patient will maintain adequate nutrition and hydration with optimum safety and efficacy of swallowing function on P.O. intake without overt s/sx of penetration or aspiration (to be achieved by discharge). -- {NOTE:09492} MET    Patient will independently utilize compensatory strategies with optimum safety and efficacy of swallowing function on P.O. intake without overt s/sx of penetration or aspiration (to be achieved by discharge). -- {NOTE:04501} MET      Plan: ***  Patient would benefit from outpatient skilled Speech Therapy services: Dysphagia therapy (Due to patient's history of dysphagia, she may benefit from neuromuscular electrical stimulation (NMES) (i.e.,  "VitalStim) treatment in conjunction with pharyngeal/laryngeal strengthening exercises and P.O. intake, unless otherwise contraindicated.)    Frequency: 2x weekly  Duration: 2 months    Intervention certification from: 2025  Intervention certification to: ***      Subjective:  ***    Patient's goal(s): \"to learn how to swallow again\"    Pain: *** present (rating: 10/10 in throat) -- Patient stated that she self-administered pain medication prior to presenting to today's appointment.      Objective (testing):    Functional Outcome Measurements    -Functional Oral Intake Scale (FOIS): 2 - tube dependent with minimal/inconsistent oral intake    -Eating Assessment Tool (EAT-10) is a self-administered, symptom-specific outcome instrument for dysphagia. It consists of ten statements that a patient rates on a scale of 0-4, with 0=no problem to 4=severe problem. A score of 3 or more is abnormal. Patient obtained a score of ***/40. (see scanned document) (IE: 39) -- ***      HEAD AND NECK CANCER CHECKLIST:  *Patient indicated that {He/she (caps):38425} is experiencing difficulties in the following areas:    SWALLOWING: {SWALLOWIN}     SPEECH: {SPEECHSX:69624}    VOICE: {VOICE:81115}      -Difficulty swallowing: Solids, Liquids, and Pills ***    -Current diet (solids): Very limited P.O. intake (e.g., mashed potatoes, soup--e.g., chicken rice, yogurt)  -Current diet (liquids): Thin (sips of water, jello, has tried Ensure)  -Current pill intake method: Takes small pills whole with water, takes larger pills crushed in puree  -Alternative Feeding Method?: PEG tube (at most, taking 6 cans of TF formula/day -- patient noted that it upsets her stomach)    -Facial appearance Symmetrical   -Dentition Upper dentures, limited bottom (a few natural)   -Labial function WFL   -Lingual function Decreased ROM (elevation) and Decreased strength (bilateral) -- white coating visualized near base of tongue (?thrush)   -Velar function " Unable to visualize   -Trismus (i.e., limited jaw opening) Present (measurement: 20 mm)   -Lymphedema Absent   -Xerostomia  Present (self-rating: 10)   -Neoplastic pain Present    -Odynophagia (i.e., pain with swallowing) Present (scale: 10)   -Mucositis  Absent   -Dysgeusia (i.e., altered taste)  Present     LIQUID CONSISTENCY TESTING:   Item(s) tested: (Thin) - water    Administered by: Cup, Spoon, and Straw    Clinical findings:  -Oral phase impairments: N/A, patient WFL  -Pharyngeal phase impairments: reduced hyolaryngeal elevation, reduced hyolaryngeal excursion, and other: c/o globus sensation    Other notes: Patient reported c/o pain with swallowing.    Strategies, attempts, and responses: Patient independently utilized multiple swallows, which she reported helped to clear the globus sensation.      SOLID CONSISTENCY TESTING:  Item(s) tested: (Puree) - applesauce (*Patient expressed that she felt comfortable completing a trial of puree on this date of service.)    Administered by: Self-fed    Clinical findings:  -Oral phase impairments: increased time for cohesive bolus formation and AP transfer  -Pharyngeal phase impairments: reduced hyolaryngeal elevation, reduced hyolaryngeal excursion, and other: c/o globus sensation    Other notes: Patient reported c/o pain with swallowing.    Strategies, attempts, and responses: Patient utilized liquid wash and multiple swallows, which she reported helped to clear the globus sensation.      Treatment:  ***      Visit Tracking:  POC   Expires Auth Expiration Date ST Visit Limit   *** or 10th visit 12/31/2025 [facility]              Visit/Unit Tracking:  Auth Status Date ***   Not required Used 1    Remaining 9

## 2025-02-24 ENCOUNTER — HOSPITAL ENCOUNTER (OUTPATIENT)
Dept: INFUSION CENTER | Facility: CLINIC | Age: 70
Discharge: HOME | End: 2025-02-24
Payer: MEDICARE

## 2025-02-24 ENCOUNTER — HOSPITAL ENCOUNTER (OUTPATIENT)
Dept: RADIOLOGY | Facility: HOSPITAL | Age: 70
Discharge: HOME | End: 2025-02-24
Attending: INTERNAL MEDICINE
Payer: MEDICARE

## 2025-02-24 VITALS
SYSTOLIC BLOOD PRESSURE: 112 MMHG | TEMPERATURE: 97.6 F | DIASTOLIC BLOOD PRESSURE: 73 MMHG | RESPIRATION RATE: 18 BRPM | HEART RATE: 105 BPM

## 2025-02-24 DIAGNOSIS — Z17.0 MALIGNANT NEOPLASM OF UPPER-OUTER QUADRANT OF RIGHT BREAST IN FEMALE, ESTROGEN RECEPTOR POSITIVE (HCC): Primary | ICD-10-CM

## 2025-02-24 DIAGNOSIS — C78.7 METASTATIC COLON CANCER TO LIVER (HCC): ICD-10-CM

## 2025-02-24 DIAGNOSIS — C50.411 MALIGNANT NEOPLASM OF UPPER-OUTER QUADRANT OF RIGHT BREAST IN FEMALE, ESTROGEN RECEPTOR POSITIVE (HCC): Primary | ICD-10-CM

## 2025-02-24 DIAGNOSIS — C18.9 METASTATIC COLON CANCER TO LIVER (HCC): ICD-10-CM

## 2025-02-24 LAB
ALBUMIN SERPL BCG-MCNC: 3.4 G/DL (ref 3.5–5)
ALP SERPL-CCNC: 110 U/L (ref 34–104)
ALT SERPL W P-5'-P-CCNC: 14 U/L (ref 7–52)
ANION GAP SERPL CALCULATED.3IONS-SCNC: 8 MMOL/L (ref 4–13)
AST SERPL W P-5'-P-CCNC: 36 U/L (ref 13–39)
BASOPHILS # BLD AUTO: 0.03 THOUSANDS/ÂΜL (ref 0–0.1)
BASOPHILS NFR BLD AUTO: 0 % (ref 0–1)
BILIRUB SERPL-MCNC: 0.33 MG/DL (ref 0.2–1)
BUN SERPL-MCNC: 26 MG/DL (ref 5–25)
CALCIUM ALBUM COR SERPL-MCNC: 9.9 MG/DL (ref 8.3–10.1)
CALCIUM SERPL-MCNC: 9.4 MG/DL (ref 8.4–10.2)
CHLORIDE SERPL-SCNC: 97 MMOL/L (ref 96–108)
CO2 SERPL-SCNC: 26 MMOL/L (ref 21–32)
CREAT SERPL-MCNC: 1.34 MG/DL (ref 0.6–1.3)
EOSINOPHIL # BLD AUTO: 0.01 THOUSAND/ÂΜL (ref 0–0.61)
EOSINOPHIL NFR BLD AUTO: 0 % (ref 0–6)
ERYTHROCYTE [DISTWIDTH] IN BLOOD BY AUTOMATED COUNT: 16.6 % (ref 11.6–15.1)
GFR SERPL CREATININE-BSD FRML MDRD: 40 ML/MIN/1.73SQ M
GLUCOSE SERPL-MCNC: 108 MG/DL (ref 65–140)
HCT VFR BLD AUTO: 25.6 % (ref 34.8–46.1)
HGB BLD-MCNC: 8 G/DL (ref 11.5–15.4)
IMM GRANULOCYTES # BLD AUTO: 0.04 THOUSAND/UL (ref 0–0.2)
IMM GRANULOCYTES NFR BLD AUTO: 0 % (ref 0–2)
LYMPHOCYTES # BLD AUTO: 0.74 THOUSANDS/ÂΜL (ref 0.6–4.47)
LYMPHOCYTES NFR BLD AUTO: 8 % (ref 14–44)
MAGNESIUM SERPL-MCNC: 2 MG/DL (ref 1.9–2.7)
MCH RBC QN AUTO: 30 PG (ref 26.8–34.3)
MCHC RBC AUTO-ENTMCNC: 31.3 G/DL (ref 31.4–37.4)
MCV RBC AUTO: 96 FL (ref 82–98)
MONOCYTES # BLD AUTO: 0.67 THOUSAND/ÂΜL (ref 0.17–1.22)
MONOCYTES NFR BLD AUTO: 7 % (ref 4–12)
NEUTROPHILS # BLD AUTO: 8.11 THOUSANDS/ÂΜL (ref 1.85–7.62)
NEUTS SEG NFR BLD AUTO: 85 % (ref 43–75)
NRBC BLD AUTO-RTO: 0 /100 WBCS
PLATELET # BLD AUTO: 391 THOUSANDS/UL (ref 149–390)
PMV BLD AUTO: 9.7 FL (ref 8.9–12.7)
POTASSIUM SERPL-SCNC: 4.4 MMOL/L (ref 3.5–5.3)
PROT SERPL-MCNC: 7.8 G/DL (ref 6.4–8.4)
RBC # BLD AUTO: 2.67 MILLION/UL (ref 3.81–5.12)
SODIUM SERPL-SCNC: 131 MMOL/L (ref 135–147)
WBC # BLD AUTO: 9.6 THOUSAND/UL (ref 4.31–10.16)

## 2025-02-24 PROCEDURE — 74177 CT ABD & PELVIS W/CONTRAST: CPT

## 2025-02-24 PROCEDURE — 85025 COMPLETE CBC W/AUTO DIFF WBC: CPT | Performed by: INTERNAL MEDICINE

## 2025-02-24 PROCEDURE — 96360 HYDRATION IV INFUSION INIT: CPT

## 2025-02-24 PROCEDURE — 83735 ASSAY OF MAGNESIUM: CPT | Performed by: INTERNAL MEDICINE

## 2025-02-24 PROCEDURE — 80053 COMPREHEN METABOLIC PANEL: CPT | Performed by: INTERNAL MEDICINE

## 2025-02-24 PROCEDURE — 71260 CT THORAX DX C+: CPT

## 2025-02-24 RX ADMIN — SODIUM CHLORIDE 1000 ML: 0.9 INJECTION, SOLUTION INTRAVENOUS at 13:26

## 2025-02-24 RX ADMIN — IOHEXOL 50 ML: 350 INJECTION, SOLUTION INTRAVENOUS at 11:53

## 2025-02-24 NOTE — PROGRESS NOTES
Labs collected via port. Pt tolerated IV hydration without incident. Pt declined AVS, aware of next appt 2/26 at 12pm.

## 2025-02-25 ENCOUNTER — TELEPHONE (OUTPATIENT)
Dept: HEMATOLOGY ONCOLOGY | Facility: CLINIC | Age: 70
End: 2025-02-25

## 2025-02-25 ENCOUNTER — APPOINTMENT (OUTPATIENT)
Dept: SPEECH THERAPY | Facility: CLINIC | Age: 70
End: 2025-02-25
Payer: MEDICARE

## 2025-02-25 ENCOUNTER — TELEPHONE (OUTPATIENT)
Dept: NUTRITION | Facility: HOSPITAL | Age: 70
End: 2025-02-25

## 2025-02-25 DIAGNOSIS — Z90.89 STATUS POST TONSILLECTOMY: ICD-10-CM

## 2025-02-25 NOTE — TELEPHONE ENCOUNTER
Called patient again to touch base regarding tube feeding order/delivery. Spoke to [facility] and still unable to reach patient.   Pt did not answer. Left voicemail with reason for call and contact information.

## 2025-02-25 NOTE — TELEPHONE ENCOUNTER
Reason for call:   [] Refill   [] Prior Auth  [] Other:     Office:   [] PCP/Provider -   [x] Specialty/Provider - Hem/onc, Agostino    Medication:   acetaminophen 160mg/5ml, 473ml  Oxucodone 5mg.5ml, 473 ml      Pharmacy:    [facility] [place]    Does the patient have enough for 3 days?   [] Yes   [x] No - Send as HP to POD

## 2025-02-25 NOTE — TELEPHONE ENCOUNTER
Medication: oxycodone 5mg/5mL  PDMP   12/16/2024 12/16/2024 oxyCODONE HCL (Solution) 473.0 24 5 MG/5 ML 29.56 BINTA AGOSTINO  10/21/2024 10/21/2024 oxyCODONE HCL (Solution) 473.0 23 5 MG/5 ML 30.85 BINTA AGOSTINO

## 2025-02-26 ENCOUNTER — HOSPITAL ENCOUNTER (OUTPATIENT)
Dept: INFUSION CENTER | Facility: CLINIC | Age: 70
Discharge: HOME | End: 2025-02-26
Payer: MEDICARE

## 2025-02-26 DIAGNOSIS — C50.411 MALIGNANT NEOPLASM OF UPPER-OUTER QUADRANT OF RIGHT BREAST IN FEMALE, ESTROGEN RECEPTOR POSITIVE (HCC): Primary | ICD-10-CM

## 2025-02-26 DIAGNOSIS — Z17.0 MALIGNANT NEOPLASM OF UPPER-OUTER QUADRANT OF RIGHT BREAST IN FEMALE, ESTROGEN RECEPTOR POSITIVE (HCC): Primary | ICD-10-CM

## 2025-02-26 LAB
ALBUMIN SERPL BCG-MCNC: 3.3 G/DL (ref 3.5–5)
ALP SERPL-CCNC: 107 U/L (ref 34–104)
ALT SERPL W P-5'-P-CCNC: 11 U/L (ref 7–52)
ANION GAP SERPL CALCULATED.3IONS-SCNC: 10 MMOL/L (ref 4–13)
AST SERPL W P-5'-P-CCNC: 33 U/L (ref 13–39)
BASOPHILS # BLD AUTO: 0.04 THOUSANDS/ÂΜL (ref 0–0.1)
BASOPHILS NFR BLD AUTO: 1 % (ref 0–1)
BILIRUB SERPL-MCNC: 0.3 MG/DL (ref 0.2–1)
BUN SERPL-MCNC: 21 MG/DL (ref 5–25)
CALCIUM ALBUM COR SERPL-MCNC: 10 MG/DL (ref 8.3–10.1)
CALCIUM SERPL-MCNC: 9.4 MG/DL (ref 8.4–10.2)
CHLORIDE SERPL-SCNC: 98 MMOL/L (ref 96–108)
CO2 SERPL-SCNC: 26 MMOL/L (ref 21–32)
CREAT SERPL-MCNC: 1.2 MG/DL (ref 0.6–1.3)
EOSINOPHIL # BLD AUTO: 0.05 THOUSAND/ÂΜL (ref 0–0.61)
EOSINOPHIL NFR BLD AUTO: 1 % (ref 0–6)
ERYTHROCYTE [DISTWIDTH] IN BLOOD BY AUTOMATED COUNT: 16.5 % (ref 11.6–15.1)
GFR SERPL CREATININE-BSD FRML MDRD: 46 ML/MIN/1.73SQ M
GLUCOSE SERPL-MCNC: 97 MG/DL (ref 65–140)
HCT VFR BLD AUTO: 25.1 % (ref 34.8–46.1)
HGB BLD-MCNC: 7.9 G/DL (ref 11.5–15.4)
IMM GRANULOCYTES # BLD AUTO: 0.02 THOUSAND/UL (ref 0–0.2)
IMM GRANULOCYTES NFR BLD AUTO: 0 % (ref 0–2)
LYMPHOCYTES # BLD AUTO: 0.62 THOUSANDS/ÂΜL (ref 0.6–4.47)
LYMPHOCYTES NFR BLD AUTO: 12 % (ref 14–44)
MAGNESIUM SERPL-MCNC: 1.9 MG/DL (ref 1.9–2.7)
MCH RBC QN AUTO: 30.4 PG (ref 26.8–34.3)
MCHC RBC AUTO-ENTMCNC: 31.5 G/DL (ref 31.4–37.4)
MCV RBC AUTO: 97 FL (ref 82–98)
MONOCYTES # BLD AUTO: 0.51 THOUSAND/ÂΜL (ref 0.17–1.22)
MONOCYTES NFR BLD AUTO: 10 % (ref 4–12)
NEUTROPHILS # BLD AUTO: 4.01 THOUSANDS/ÂΜL (ref 1.85–7.62)
NEUTS SEG NFR BLD AUTO: 76 % (ref 43–75)
NRBC BLD AUTO-RTO: 0 /100 WBCS
PLATELET # BLD AUTO: 332 THOUSANDS/UL (ref 149–390)
PMV BLD AUTO: 10.1 FL (ref 8.9–12.7)
POTASSIUM SERPL-SCNC: 4.3 MMOL/L (ref 3.5–5.3)
PROT SERPL-MCNC: 7.5 G/DL (ref 6.4–8.4)
RBC # BLD AUTO: 2.6 MILLION/UL (ref 3.81–5.12)
SODIUM SERPL-SCNC: 134 MMOL/L (ref 135–147)
WBC # BLD AUTO: 5.25 THOUSAND/UL (ref 4.31–10.16)

## 2025-02-26 PROCEDURE — 96360 HYDRATION IV INFUSION INIT: CPT

## 2025-02-26 PROCEDURE — 85025 COMPLETE CBC W/AUTO DIFF WBC: CPT | Performed by: INTERNAL MEDICINE

## 2025-02-26 PROCEDURE — 80053 COMPREHEN METABOLIC PANEL: CPT | Performed by: INTERNAL MEDICINE

## 2025-02-26 PROCEDURE — 83735 ASSAY OF MAGNESIUM: CPT | Performed by: INTERNAL MEDICINE

## 2025-02-26 RX ADMIN — SODIUM CHLORIDE 1000 ML: 0.9 INJECTION, SOLUTION INTRAVENOUS at 12:22

## 2025-02-27 ENCOUNTER — APPOINTMENT (OUTPATIENT)
Dept: SPEECH THERAPY | Facility: CLINIC | Age: 70
End: 2025-02-27
Payer: MEDICARE

## 2025-02-27 ENCOUNTER — PATIENT OUTREACH (OUTPATIENT)
Dept: HEMATOLOGY ONCOLOGY | Facility: CLINIC | Age: 70
End: 2025-02-27

## 2025-02-27 DIAGNOSIS — C09.9 RIGHT TONSILLAR SQUAMOUS CELL CARCINOMA (HCC): ICD-10-CM

## 2025-02-27 DIAGNOSIS — R13.12 DYSPHAGIA, OROPHARYNGEAL PHASE: Primary | ICD-10-CM

## 2025-02-27 DIAGNOSIS — Z90.89 STATUS POST TONSILLECTOMY: ICD-10-CM

## 2025-02-27 RX ORDER — OXYCODONE HCL 5 MG/5 ML
5 SOLUTION, ORAL ORAL EVERY 6 HOURS PRN
Qty: 473 ML | Refills: 0 | Status: SHIPPED | OUTPATIENT
Start: 2025-02-27

## 2025-02-27 RX ORDER — ACETAMINOPHEN 160 MG/5ML
LIQUID ORAL
Qty: 473 ML | Refills: 3 | Status: SHIPPED | OUTPATIENT
Start: 2025-02-27

## 2025-02-27 NOTE — PROGRESS NOTES
Speech-Language Pathology Re-Evaluation    Today's date: 2025 ***  Patient’s name: Maira Moura  : 1955  MRN: [id number]  Safety measures: HTN, CVA, GERD, active CA, s/p PEG tube placement, aspiration precautions  Current recommended diet: PEG tube for primary nutrition/hydration; puree with thin liquids P.O. as tolerated ***  Referring provider: Harika Medina DO Encounter Diagnosis     ICD-10-CM    1. Dysphagia, oropharyngeal phase  R13.12       2. Status post tonsillectomy  Z90.89       3. 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 Based upon the trials administered today (puree and thin liquids), increased time was required for cohesive bolus formation and AP transfer of puree. Swallow initiation appeared to be prompt. No anterior loss of any food/liquid bolus. Reduced hyolaryngeal elevation and excursion were noted upon palpation. Patient with c/o mild globus sensation on both consistencies. Patient utilized multiple swallows, which she reported helped to clear the globus sensation on thin liquid. Patient utilized liquid wash and multiple swallows, which she reported helped to clear the globus sensation on puree. Vocal quality remained at baseline status throughout assessment. No overt s/sx of penetration/aspiration noted on this date of service. Odynophagia is a significant limiting factor with P.O. intake, as well as xerostomia, dysgeusia, and reduced appetite. Patient is s/p PEG tube placement and is taking, at most, taking 6 cans of TF formula/day per report. Patient noted that it upsets her stomach. Patient indicated that she is losing weight. Patient would benefit from a follow-up with Nutrition to discuss. Also, to note, a white coating was visualized near patient's base of tongue (?thrush). Recommend that patient follow-up with her physician for further evaluation. Patient  would benefit from outpatient skilled Speech Therapy services for further education/training on safe swallowing strategies & aspiration precautions, for continued assessment of patient's tolerance of the safest, least restrictive diet, for therapeutic exercises, to facilitate overall improved quality of life, and for instruction on HEP. It is recommended that patient receive a videofluoroscopic swallow study (VFSS) to assess her current swallowing function to r/o penetration or aspiration and to determine the safest, least restrictive diet.      -Safety concerns: Risk for aspiration, Risk for choking, and Risk for inadequate nutrition/ hydration    -Risk factors: History of Head and Neck Cancer, Complex medical history, and GERD      SWALLOWING SAFETY PRECAUTIONS: ***  PEG tube for primary nutrition/hydration    -Recommended solids: Puree    -Recommended liquids: Thin    -Recommended medication form: Crushed with puree or or via PEG (*consult with physician to discuss if medication form can be altered)    -Frequent/thorough oral care    -Aspiration precautions  *Monitor for signs/symptoms concerning for aspiration (e.g., low grade fever, increase in WBC, change in chest x-ray, increased congestion, increased coughing with P.O. intake)    -Reflux precautions    -Strategies: Small sips and bites when eating, Slow rate, swallow between bites, and Alternate liquids and solids    -Positioning: Upright position during meals and Upright position at least 30 minutes after P.O. intake    -Compensatory strategies: Effortful swallow and Multiple swallows    -Supervision: Independent     -Referrals: Videofluroscopic Swallow Study and Nutritionist (Patient stated that her RD is out on maternity leave. Patient reported that she plans on scheduling an appointment with Nutrition the next time she visits the office for an infusion.)      Short-term goals:  Patient will receive a videofluoroscopic swallow study (VFSS) to assess her  current swallowing function to r/o penetration or aspiration, to determine the safest, least restrictive diet, and to recommended the appropriate rehabilitation exercises (to be achieved in 2-3 weeks). -- {NOTE:65292} MET    Patient will be educated on safe swallowing compensatory strategies (e.g., upright posture, small bites/sips, slow rate, alternation of consistencies, multiple swallows, effortful swallow, etc.) to facilitate increased safety with P.O. intake (to be achieved in 4-6 weeks). -- {NOTE:94557} MET    Patient will tolerate P.O. trials of her recommended diet with the implementation of safe swallowing strategies without overt s/sx of penetration and/or aspiration during skilled therapy sessions in this certification period (to be achieved in 4-6 weeks). -- {NOTE:81878} MET    Patient will perform oral motor exercises using IOPI device on lingual muscles to facilitate increased function and strength by 25% for improved swallowing function (to be achieved in 6-8 weeks). -- {NOTE:87224} MET    Patient will independently complete pharyngeal strengthening exercises (e.g., Effortful swallow, Mendelsohn, Evelia) daily to facilitate increased pharyngeal strength and coordination (to be achieved in 4-6 weeks). -- {NOTE:38441} MET    Long-term goals:  Patient will maintain adequate nutrition and hydration with optimum safety and efficacy of swallowing function on P.O. intake without overt s/sx of penetration or aspiration (to be achieved by discharge). -- {NOTE:86644} MET    Patient will independently utilize compensatory strategies with optimum safety and efficacy of swallowing function on P.O. intake without overt s/sx of penetration or aspiration (to be achieved by discharge). -- {NOTE:17326} MET      Plan: ***  Patient would benefit from outpatient skilled Speech Therapy services: Dysphagia therapy (Due to patient's history of dysphagia, she may benefit from neuromuscular electrical stimulation (NMES) (i.e.,  "VitalStim) treatment in conjunction with pharyngeal/laryngeal strengthening exercises and P.O. intake, unless otherwise contraindicated.)    Frequency: 2x weekly  Duration: 2 months    Intervention certification from: 2025  Intervention certification to: ***      Subjective:  ***    Patient's goal(s): \"to learn how to swallow again\"    Pain: *** present (rating: 10/10 in throat) -- Patient stated that she self-administered pain medication prior to presenting to today's appointment.      Objective (testing):    Functional Outcome Measurements    -Functional Oral Intake Scale (FOIS): 2 - tube dependent with minimal/inconsistent oral intake    -Eating Assessment Tool (EAT-10) is a self-administered, symptom-specific outcome instrument for dysphagia. It consists of ten statements that a patient rates on a scale of 0-4, with 0=no problem to 4=severe problem. A score of 3 or more is abnormal. Patient obtained a score of ***/40. (see scanned document) (IE: 39) -- ***      HEAD AND NECK CANCER CHECKLIST:  *Patient indicated that {He/she (caps):85248} is experiencing difficulties in the following areas:    SWALLOWING: {SWALLOWIN}     SPEECH: {SPEECHSX:17596}    VOICE: {VOICE:33413}      -Difficulty swallowing: Solids, Liquids, and Pills ***    -Current diet (solids): Very limited P.O. intake (e.g., mashed potatoes, soup--e.g., chicken rice, yogurt)  -Current diet (liquids): Thin (sips of water, jello, has tried Ensure)  -Current pill intake method: Takes small pills whole with water, takes larger pills crushed in puree  -Alternative Feeding Method?: PEG tube (at most, taking 6 cans of TF formula/day -- patient noted that it upsets her stomach)    -Facial appearance Symmetrical   -Dentition Upper dentures, limited bottom (a few natural)   -Labial function WFL   -Lingual function Decreased ROM (elevation) and Decreased strength (bilateral) -- white coating visualized near base of tongue (?thrush)   -Velar function " Unable to visualize   -Trismus (i.e., limited jaw opening) Present (measurement: 20 mm)   -Lymphedema Absent   -Xerostomia  Present (self-rating: 10)   -Neoplastic pain Present    -Odynophagia (i.e., pain with swallowing) Present (scale: 10)   -Mucositis  Absent   -Dysgeusia (i.e., altered taste)  Present     LIQUID CONSISTENCY TESTING:   Item(s) tested: (Thin) - water    Administered by: Cup, Spoon, and Straw    Clinical findings:  -Oral phase impairments: N/A, patient WFL  -Pharyngeal phase impairments: reduced hyolaryngeal elevation, reduced hyolaryngeal excursion, and other: c/o globus sensation    Other notes: Patient reported c/o pain with swallowing.    Strategies, attempts, and responses: Patient independently utilized multiple swallows, which she reported helped to clear the globus sensation.      SOLID CONSISTENCY TESTING:  Item(s) tested: (Puree) - applesauce (*Patient expressed that she felt comfortable completing a trial of puree on this date of service.)    Administered by: Self-fed    Clinical findings:  -Oral phase impairments: increased time for cohesive bolus formation and AP transfer  -Pharyngeal phase impairments: reduced hyolaryngeal elevation, reduced hyolaryngeal excursion, and other: c/o globus sensation    Other notes: Patient reported c/o pain with swallowing.    Strategies, attempts, and responses: Patient utilized liquid wash and multiple swallows, which she reported helped to clear the globus sensation.      Treatment:  ***      Visit Tracking:  POC   Expires Auth Expiration Date ST Visit Limit   *** or 10th visit 12/31/2025 [facility]              Visit/Unit Tracking:  Auth Status Date ***   Not required Used 1    Remaining 9

## 2025-02-27 NOTE — PROGRESS NOTES
Pt called stating her ride for her speech therapy appointment did not show. Pt was not scheduled for transportation for this appointment.    Spoke with  [facility],   she will be able to use  [facility] or these  appointments, but the speech therapy office needs to send her schedule to them.  I advised the speech therapy office and the pt of this. She was thankful for the assistance

## 2025-02-28 ENCOUNTER — HOSPITAL ENCOUNTER (OUTPATIENT)
Dept: INFUSION CENTER | Facility: CLINIC | Age: 70
End: 2025-02-28

## 2025-02-28 ENCOUNTER — PATIENT OUTREACH (OUTPATIENT)
Dept: HEMATOLOGY ONCOLOGY | Facility: CLINIC | Age: 70
End: 2025-02-28

## 2025-02-28 ENCOUNTER — OFFICE VISIT (OUTPATIENT)
Dept: HEMATOLOGY ONCOLOGY | Facility: CLINIC | Age: 70
End: 2025-02-28
Payer: MEDICARE

## 2025-02-28 ENCOUNTER — TELEPHONE (OUTPATIENT)
Dept: HEMATOLOGY ONCOLOGY | Facility: CLINIC | Age: 70
End: 2025-02-28

## 2025-02-28 ENCOUNTER — HOSPITAL ENCOUNTER (OUTPATIENT)
Dept: INFUSION CENTER | Facility: HOSPITAL | Age: 70
End: 2025-02-28
Attending: INTERNAL MEDICINE
Payer: MEDICARE

## 2025-02-28 VITALS
HEART RATE: 103 BPM | SYSTOLIC BLOOD PRESSURE: 121 MMHG | TEMPERATURE: 97.3 F | DIASTOLIC BLOOD PRESSURE: 78 MMHG | RESPIRATION RATE: 16 BRPM

## 2025-02-28 VITALS
WEIGHT: 160 LBS | BODY MASS INDEX: 25.71 KG/M2 | SYSTOLIC BLOOD PRESSURE: 118 MMHG | RESPIRATION RATE: 16 BRPM | OXYGEN SATURATION: 95 % | HEART RATE: 102 BPM | DIASTOLIC BLOOD PRESSURE: 70 MMHG | TEMPERATURE: 98 F | HEIGHT: 66 IN

## 2025-02-28 DIAGNOSIS — C78.7 METASTATIC COLON CANCER TO LIVER (HCC): Primary | ICD-10-CM

## 2025-02-28 DIAGNOSIS — D53.9 NUTRITIONAL ANEMIA: ICD-10-CM

## 2025-02-28 DIAGNOSIS — C50.411 MALIGNANT NEOPLASM OF UPPER-OUTER QUADRANT OF RIGHT BREAST IN FEMALE, ESTROGEN RECEPTOR POSITIVE (HCC): Primary | ICD-10-CM

## 2025-02-28 DIAGNOSIS — C18.9 METASTATIC COLON CANCER TO LIVER (HCC): ICD-10-CM

## 2025-02-28 DIAGNOSIS — C18.9 METASTATIC COLON CANCER TO LIVER (HCC): Primary | ICD-10-CM

## 2025-02-28 DIAGNOSIS — C09.9 RIGHT TONSILLAR SQUAMOUS CELL CARCINOMA (HCC): ICD-10-CM

## 2025-02-28 DIAGNOSIS — C78.7 METASTATIC COLON CANCER TO LIVER (HCC): ICD-10-CM

## 2025-02-28 DIAGNOSIS — Z17.0 MALIGNANT NEOPLASM OF UPPER-OUTER QUADRANT OF RIGHT BREAST IN FEMALE, ESTROGEN RECEPTOR POSITIVE (HCC): Primary | ICD-10-CM

## 2025-02-28 LAB
ABO GROUP BLD BPU: NORMAL
ABO GROUP BLD: NORMAL
ALBUMIN SERPL BCG-MCNC: 3 G/DL (ref 3.5–5)
ALP SERPL-CCNC: 105 U/L (ref 34–104)
ALT SERPL W P-5'-P-CCNC: 11 U/L (ref 7–52)
ANION GAP SERPL CALCULATED.3IONS-SCNC: 6 MMOL/L (ref 4–13)
AST SERPL W P-5'-P-CCNC: 30 U/L (ref 13–39)
BASOPHILS # BLD AUTO: 0.02 THOUSANDS/ÂΜL (ref 0–0.1)
BASOPHILS NFR BLD AUTO: 0 % (ref 0–1)
BILIRUB SERPL-MCNC: 0.32 MG/DL (ref 0.2–1)
BLD GP AB SCN SERPL QL: NEGATIVE
BPU ID: NORMAL
BUN SERPL-MCNC: 19 MG/DL (ref 5–25)
CALCIUM ALBUM COR SERPL-MCNC: 10.1 MG/DL (ref 8.3–10.1)
CALCIUM SERPL-MCNC: 9.3 MG/DL (ref 8.4–10.2)
CHLORIDE SERPL-SCNC: 99 MMOL/L (ref 96–108)
CO2 SERPL-SCNC: 28 MMOL/L (ref 21–32)
CREAT SERPL-MCNC: 1.01 MG/DL (ref 0.6–1.3)
CROSSMATCH: NORMAL
EOSINOPHIL # BLD AUTO: 0.07 THOUSAND/ÂΜL (ref 0–0.61)
EOSINOPHIL NFR BLD AUTO: 1 % (ref 0–6)
ERYTHROCYTE [DISTWIDTH] IN BLOOD BY AUTOMATED COUNT: 16.5 % (ref 11.6–15.1)
FERRITIN SERPL-MCNC: 712 NG/ML (ref 11–307)
FOLATE SERPL-MCNC: 14.1 NG/ML
GFR SERPL CREATININE-BSD FRML MDRD: 56 ML/MIN/1.73SQ M
GLUCOSE SERPL-MCNC: 95 MG/DL (ref 65–140)
HCT VFR BLD AUTO: 24.4 % (ref 34.8–46.1)
HGB BLD-MCNC: 8 G/DL (ref 11.5–15.4)
IMM GRANULOCYTES # BLD AUTO: 0.02 THOUSAND/UL (ref 0–0.2)
IMM GRANULOCYTES NFR BLD AUTO: 0 % (ref 0–2)
IRON SATN MFR SERPL: 12 % (ref 15–50)
IRON SERPL-MCNC: 21 UG/DL (ref 50–212)
LYMPHOCYTES # BLD AUTO: 0.5 THOUSANDS/ÂΜL (ref 0.6–4.47)
LYMPHOCYTES NFR BLD AUTO: 8 % (ref 14–44)
MAGNESIUM SERPL-MCNC: 1.8 MG/DL (ref 1.9–2.7)
MCH RBC QN AUTO: 30.8 PG (ref 26.8–34.3)
MCHC RBC AUTO-ENTMCNC: 32.8 G/DL (ref 31.4–37.4)
MCV RBC AUTO: 94 FL (ref 82–98)
MONOCYTES # BLD AUTO: 0.5 THOUSAND/ÂΜL (ref 0.17–1.22)
MONOCYTES NFR BLD AUTO: 8 % (ref 4–12)
NEUTROPHILS # BLD AUTO: 5.12 THOUSANDS/ÂΜL (ref 1.85–7.62)
NEUTS SEG NFR BLD AUTO: 83 % (ref 43–75)
NRBC BLD AUTO-RTO: 0 /100 WBCS
PLATELET # BLD AUTO: 296 THOUSANDS/UL (ref 149–390)
PMV BLD AUTO: 9.3 FL (ref 8.9–12.7)
POTASSIUM SERPL-SCNC: 4.3 MMOL/L (ref 3.5–5.3)
PROT SERPL-MCNC: 7.3 G/DL (ref 6.4–8.4)
RBC # BLD AUTO: 2.6 MILLION/UL (ref 3.81–5.12)
RH BLD: POSITIVE
SODIUM SERPL-SCNC: 133 MMOL/L (ref 135–147)
SPECIMEN EXPIRATION DATE: NORMAL
TIBC SERPL-MCNC: 170.8 UG/DL (ref 250–450)
TRANSFERRIN SERPL-MCNC: 122 MG/DL (ref 203–362)
UIBC SERPL-MCNC: 150 UG/DL (ref 155–355)
UNIT DISPENSE STATUS: NORMAL
UNIT PRODUCT CODE: NORMAL
UNIT PRODUCT VOLUME: 350 ML
UNIT RH: NORMAL
VIT B12 SERPL-MCNC: 954 PG/ML (ref 180–914)
WBC # BLD AUTO: 6.23 THOUSAND/UL (ref 4.31–10.16)

## 2025-02-28 PROCEDURE — 86850 RBC ANTIBODY SCREEN: CPT | Performed by: INTERNAL MEDICINE

## 2025-02-28 PROCEDURE — 96365 THER/PROPH/DIAG IV INF INIT: CPT

## 2025-02-28 PROCEDURE — 83540 ASSAY OF IRON: CPT | Performed by: INTERNAL MEDICINE

## 2025-02-28 PROCEDURE — 86923 COMPATIBILITY TEST ELECTRIC: CPT

## 2025-02-28 PROCEDURE — 82607 VITAMIN B-12: CPT | Performed by: INTERNAL MEDICINE

## 2025-02-28 PROCEDURE — G2211 COMPLEX E/M VISIT ADD ON: HCPCS | Performed by: INTERNAL MEDICINE

## 2025-02-28 PROCEDURE — 82728 ASSAY OF FERRITIN: CPT | Performed by: INTERNAL MEDICINE

## 2025-02-28 PROCEDURE — 86901 BLOOD TYPING SEROLOGIC RH(D): CPT | Performed by: INTERNAL MEDICINE

## 2025-02-28 PROCEDURE — 83735 ASSAY OF MAGNESIUM: CPT | Performed by: INTERNAL MEDICINE

## 2025-02-28 PROCEDURE — 82746 ASSAY OF FOLIC ACID SERUM: CPT | Performed by: INTERNAL MEDICINE

## 2025-02-28 PROCEDURE — 99215 OFFICE O/P EST HI 40 MIN: CPT | Performed by: INTERNAL MEDICINE

## 2025-02-28 PROCEDURE — 82525 ASSAY OF COPPER: CPT | Performed by: INTERNAL MEDICINE

## 2025-02-28 PROCEDURE — 85025 COMPLETE CBC W/AUTO DIFF WBC: CPT | Performed by: INTERNAL MEDICINE

## 2025-02-28 PROCEDURE — 80053 COMPREHEN METABOLIC PANEL: CPT | Performed by: INTERNAL MEDICINE

## 2025-02-28 PROCEDURE — 84630 ASSAY OF ZINC: CPT | Performed by: INTERNAL MEDICINE

## 2025-02-28 PROCEDURE — 83550 IRON BINDING TEST: CPT | Performed by: INTERNAL MEDICINE

## 2025-02-28 PROCEDURE — 86900 BLOOD TYPING SEROLOGIC ABO: CPT | Performed by: INTERNAL MEDICINE

## 2025-02-28 RX ORDER — FLUOROURACIL 50 MG/ML
400 INJECTION, SOLUTION INTRAVENOUS ONCE
Status: CANCELLED | OUTPATIENT
Start: 2025-03-05

## 2025-02-28 RX ORDER — SODIUM CHLORIDE 9 MG/ML
20 INJECTION, SOLUTION INTRAVENOUS ONCE
Status: CANCELLED | OUTPATIENT
Start: 2025-02-28

## 2025-02-28 RX ORDER — SODIUM CHLORIDE 9 MG/ML
20 INJECTION, SOLUTION INTRAVENOUS ONCE
Status: CANCELLED | OUTPATIENT
Start: 2025-03-03

## 2025-02-28 RX ADMIN — MAGNESIUM SULFATE HEPTAHYDRATE: 500 INJECTION, SOLUTION INTRAMUSCULAR; INTRAVENOUS at 12:30

## 2025-02-28 NOTE — PROGRESS NOTES
Name: Maira Moura      : 1955      MRN: [id number]  Encounter Provider: Harika Medina DO  Encounter Date: 2025   Encounter department: [facility] [place]  :  Assessment & Plan  Metastatic colon cancer to liver (HCC)    Patient is a 70 year old female with metastatic colon cancer, originally dx in 2023. Baseline CARIS testing showed KRAS G12D mutation, MSI-stable, and PD-L1 negative status. Patient was initiated on 1L treatment with FOLFOX in 2023. Patient had completed 12 cycles of FOLFOX, after which she was on maintenance with nivolumab plus 5-FU. Patient is s/p right hemicolectomy in 2024 as there was concern for possible partial obstruction from the colon mass. She is also s/p cryoablation of right hepatic metastatic lesion on 6/3/24. Colon cancer directed treatment was on hold since ~ 2024 for management of tonsillar cancer with chemoRT and for prolonged clinical recovery from side effects of treatment and tonsillectomy.     Pt's most recent PET scan from  was concerning for interval progression of hepatic metastases. Patient was started on FOLFIRI 25, but could only tolerate 2 rounds of treatment thus far due to side effects of diarrhea, neutropenia, and fatigue. Last active treatment was on 25, more than one month prior. Most recent CT scan from 2025 reports continued interval progression of hepatic mets with increase in size and number of lesions. There were also new and small pulmonary nodules, concerning for metastatic disease.     Maira presented to the office today. She has a borderline performance status at this time, ECOG 2. Diarrhea and neutropenia have resolved. She expressed interest to resume treatment again. We will plan on resuming FOLFIRI next week with 50% dose reduction in irinotecan. Will clarify with Dr. Medina whether we will be removing 5-FU bolus from future treatments.     In the  meantime, we will continue supportive care with IVF. Patient knows to follow-up with nutrition team for recommendations regarding tube feedings to make sure that she is getting adequate amount of calories through her PEG tube.     Orders:    TIBC Panel (incl. Iron, TIBC, % Iron Saturation); Future    Ferritin; Future    Vitamin B12; Future    Folate; Future    Zinc; Future    Copper Level; Future    Type and screen; Future    Right tonsillar squamous cell carcinoma (HCC)    Patient with history of locally advanced right tonsillar cancer dx in July 2023. She is s/p chemoRT followed by tonsillectomy in October 2024. There was no residual tumor noted on tonsillectomy specimen.     Pt's most recent PET scan from 12/27 reported interval near complete metabolic response to therapy of previously noted hypermetabolic malignancy involving the neck.    Patient ws recommended to continue close surveillance with ENT team.        Nutritional anemia    Patient's recent CBCs have been notable for persistent anemia in the 7-8 g/dL range. MCV within normal range. Likely anemia of chronic inflammation. We will check iron panel, B12, folate, and zinc levels to see if there are any nutritional deficiencies causing pt's anemia.     Supportive transfusions on an as needed basis. We have requested for for 1 unit of PRBC transfusion on Monday, 3/3/25.          Summary of recommendations:   Patient with progression of colon cancer with liver involvement. Imaging also concerning for new lung metastases. Patient has a borderline performance status at this time. She remains treatment focused and expressed interest in resuming treatment.   We will plan for resuming C3 of FOLFIRI next week on 3/5/24  Irinotecan will be dose reduced by 50% (90 mg/m2)   Will clarify whether 5-FU bolus will be removed from future treatments  Check for any nutritional deficiencies that ma be contributing to pt's anemia  Continue supportive care with IVF and  transfusions as needed  Follow-up in office in 3 weeks, prior to C4 of FOLFIRI    History of Present Illness     .  Oncology History Overview Note   2/2019 - pT2N0 breast cancer treated with anastrozole, oncotype 13, ER+ IN+ Her2 neg     7/2023 - metastatic ascending colon adenocarcinoma to liver     Caris - KRAS G12D, CAIN, PD-L1 0%     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, unresectable     7/31/2023 - FOLFOX     11/20/2023 - opdivo added to FOLFOX     12/18/2023-cycle 11-20% dose reduction of 5-FU bolus and oxaliplatin due to increased fatigue     Jan 2024 - oxaliplatin removed from treatment plan due to neuropathy - PET scan shows good disease control in all areas except colon lesion     3/2024 - right hemicolectomy - pT3N0     6/3/24 - cryoablation to liver (no interruption to systemic therapy)     7/30/2024 - stop folfox     9/2024 - 10/2024 - cis/RT to tonsils and cervical Lns    12/2024 - disease progression of colon cancer in the liver    01/2025 - Initiated FOLFIRI every 14-days, with 50% dose-reduction of Irinotecan due to pre-existing diarrhea.    February 2025: systemic chemotherapy held due to patient experiencing prolonged side effects of diarrhea and neutropenia.     March 2025: resume C3 of FOLFIRI      Malignant neoplasm of right female breast (HCC)   11/23/2018 Initial Diagnosis    Malignant neoplasm of right female breast (HCC)     11/23/2018 Biopsy    Rt Breast US BX 1100 8cmfn, 4 passes 12g Marquee:  - Invasive breast carcinoma of no special type (ductal NST/invasive ductal carcinoma).  - grade 2  - %  - IN 95%  - HER2 negative     12/20/2018 Surgery    Right partial mastectomy and SLN biopsy:  Infiltrating ductal carcinoma, Grade 2/3, felt to be between 2.0 cm and 2.5 cm in greatest dimension  - No invasive tumor is seen at inked margins, but there is a focus of DCIS seen within approximately 1mm of the inked medial margin  - no LVI  - no LCIS  - 0/2 LN's  at least Stage  "IIA - pT2, pN0, cM0, G2.    - Dr Coronado     12/20/2018 -  Cancer Staged    Stage IIA - pT2, pN0, cM0, G2.       1/4/2019 Genomic Testing    Oncotype DX score: 13     2/1/2019 -  Hormone Therapy    anastrozole 1 mg daily as adjuvant endocrine therapy    - Dr Daley       2/14/2019 - 4/3/2019 Radiation    Course: C1    Plan ID Energy Fractions Dose per Fraction (cGy) Dose Correction (cGy) Total Dose Delivered (cGy) Elapsed Days   R Breast 10X/6X 25 / 25 200 0 5,000 40   R BRST BOOST 16E 5 / 5 200 0 1,000 7      Treatment dates:  C1: 2/14/2019 - 4/3/2019       Metastatic colon cancer to liver (HCC)   7/6/2023 Genetic Testing    CARIS Results:   KRAS Pathogenic Variant Exon 2  p.G12D : lack of benefit of cetuximab, panitumumab Level 2   No other actionable mutation; MSI stable; TMB low   MiFOLOXAi Results: \"Decreased Benefit of FOLFOX + Bevacizumab in first line metastatic CRC.  This patient may achieve improved results by receiving an alternative to FOLFOX, such as FOLFIRI, as their initial regimen. As an adjustment to frontline FOLFOXIRI following toxicity: This patient may achieve improved results by removing the oxaliplatin portion of their regimen\".         7/11/2023 Initial Diagnosis    Metastatic colon cancer to liver (HCC)     7/13/2023 -  Cancer Staged    Staging form: Colon and Rectum, AJCC 8th Edition  - Clinical stage from 7/13/2023: cT3, cN0, pM1 - Signed by Harika Medina DO on 9/28/2023  Total positive nodes: 0       7/31/2023 - 8/1/2024 Chemotherapy    cyanocobalamin, 1,000 mcg, Intramuscular, Every 30 days, 7 of 9 cycles  Administration: 1,000 mcg (5/9/2024), 1,000 mcg (5/24/2024), 1,000 mcg (6/20/2024), 1,000 mcg (7/3/2024), 1,000 mcg (7/18/2024), 1,000 mcg (8/1/2024)  potassium chloride, 20 mEq, Intravenous, Once, 2 of 2 cycles  Administration: 20 mEq (11/6/2023), 20 mEq (11/20/2023)  alteplase (CATHFLO), 2 mg, Intracatheter, Every 1 Minute as needed, 21 of 23 cycles  Administration: 2 mg " (1/3/2024)  pegfilgrastim (NEULASTA), 6 mg, Subcutaneous, Once, 12 of 12 cycles  Administration: 6 mg (10/25/2023), 6 mg (11/8/2023), 6 mg (11/22/2023), 6 mg (12/6/2023), 6 mg (12/20/2023), 6 mg (1/12/2024), 6 mg (1/25/2024), 6 mg (4/25/2024), 6 mg (5/9/2024), 6 mg (5/24/2024), 6 mg (6/20/2024)  fluorouracil (ADRUCIL), 895 mg, Intravenous, Once, 21 of 23 cycles  Dose modification: 320 mg/m2 (original dose 400 mg/m2, Cycle 11)  Administration: 900 mg (7/31/2023), 900 mg (8/14/2023), 900 mg (8/28/2023), 900 mg (9/11/2023), 900 mg (9/25/2023), 900 mg (10/9/2023), 900 mg (10/23/2023), 895 mg (11/6/2023), 895 mg (11/20/2023), 895 mg (12/4/2023), 700 mg (12/18/2023), 680 mg (1/10/2024), 680 mg (1/23/2024), 625 mg (4/23/2024), 625 mg (5/7/2024), 625 mg (5/22/2024), 625 mg (6/4/2024), 625 mg (6/18/2024), 625 mg (7/1/2024), 625 mg (7/16/2024), 625 mg (7/30/2024)  nivolumab (OPDIVO) IVPB, 240 mg (100 % of original dose 240 mg), Intravenous, Once, 13 of 15 cycles  Dose modification: 240 mg (original dose 240 mg, Cycle 9)  Administration: 240 mg (11/20/2023), 240 mg (12/4/2023), 240 mg (12/18/2023), 240 mg (1/10/2024), 240 mg (1/23/2024), 240 mg (4/23/2024), 240 mg (5/7/2024), 240 mg (5/22/2024), 240 mg (6/4/2024), 240 mg (6/18/2024), 240 mg (7/1/2024), 240 mg (7/16/2024), 240 mg (7/30/2024)  leucovorin calcium IVPB, 896 mg, Intravenous, Once, 21 of 23 cycles  Administration: 900 mg (7/31/2023), 900 mg (8/14/2023), 900 mg (8/28/2023), 900 mg (9/11/2023), 900 mg (9/25/2023), 900 mg (10/9/2023), 900 mg (10/23/2023), 900 mg (11/6/2023), 900 mg (11/20/2023), 900 mg (12/4/2023), 900 mg (12/18/2023), 850 mg (1/10/2024), 850 mg (1/23/2024), 800 mg (4/23/2024), 800 mg (5/7/2024), 800 mg (5/22/2024), 800 mg (6/4/2024), 800 mg (6/18/2024), 800 mg (7/1/2024), 800 mg (7/16/2024), 800 mg (7/30/2024)  oxaliplatin (ELOXATIN) chemo infusion, 85 mg/m2 = 190.4 mg, Intravenous, Once, 12 of 12 cycles  Dose modification: 68 mg/m2 (original dose 85  mg/m2, Cycle 11, Reason: Other (Must fill in a comment), Comment: increased fatigue)  Administration: 190.4 mg (7/31/2023), 190.4 mg (8/14/2023), 200 mg (8/28/2023), 200 mg (9/11/2023), 200 mg (9/25/2023), 200 mg (10/9/2023), 200 mg (10/23/2023), 200 mg (11/6/2023), 200 mg (11/20/2023), 190.4 mg (12/4/2023), 150 mg (12/18/2023), 150 mg (1/10/2024)  fluorouracil (ADRUCIL) ambulatory infusion Soln, 1,200 mg/m2/day = 5,375 mg, Intravenous, Over 46 hours, 21 of 23 cycles     9/26/2023 -  Cancer Staged    Staging form: Colon and Rectum, AJCC 8th Edition  - Pathologic: pT3, pN0, cM1 - Signed by Vickie Israel MD on 4/3/2024  Total positive nodes: 0       3/12/2024 Surgery    A. Terminal ileum, appendix, right colon (right hemicolectomy):  - Adenocarcinoma (5.2cm)  - Forty-nine (49) lymph nodes negative for carcinoma (0/49)    - Acellular mucin present in one (1) lymph node   - See staging synoptic (ypT3N0)     1/8/2025 -  Chemotherapy    alteplase (CATHFLO), 2 mg, Intracatheter, Every 1 Minute as needed, 2 of 5 cycles  pegfilgrastim (NEULASTA), 6 mg, Subcutaneous, Once, 0 of 3 cycles  fluorouracil (ADRUCIL), 400 mg/m2 = 770 mg, Intravenous, Once, 2 of 5 cycles  Administration: 770 mg (1/8/2025), 770 mg (1/22/2025)  irinotecan (CAMPTOSAR) chemo infusion, 173 mg (50 % of original dose 180 mg/m2), Intravenous, Once, 2 of 5 cycles  Dose modification: 90 mg/m2 (original dose 180 mg/m2, Cycle 1, Reason: Anticipated Tolerance, Comment: 50% dose reduction due to pre-existing diarrhea)  Administration: 180 mg (1/8/2025), 180 mg (1/22/2025)  leucovorin calcium IVPB, 768 mg, Intravenous, Once, 2 of 5 cycles  Administration: 800 mg (1/8/2025), 800 mg (1/22/2025)  fluorouracil (ADRUCIL) ambulatory infusion Soln, 1,200 mg/m2/day = 4,610 mg, Intravenous, Over 46 hours, 2 of 5 cycles     Right tonsillar squamous cell carcinoma (HCC)   7/27/2023 Initial Diagnosis    Right tonsillar squamous cell carcinoma (HCC)     7/27/2023 -  Cancer  Staged    Staging form: Pharynx - Oropharynx, AJCC 8th Edition  - Clinical stage from 7/27/2023: Stage III (cT1, cN3, cM0, p16+) - Signed by Evan Plummer MD on 7/27/2023  Stage prefix: Initial diagnosis       9/16/2024 - 11/13/2024 Radiation      Plan ID Energy Fractions Dose per Fraction (cGy) Dose Correction (cGy) Total Dose Delivered (cGy) Elapsed Days   Head_Neck 6X-FFF 35 / 35 200 0 7,000 58    C2: 9/16/2024 - 11/13/2024 9/17/2024 - 10/21/2024 Chemotherapy    alteplase (CATHFLO), 2 mg, Intracatheter, Every 1 Minute as needed, 6 of 6 cycles  Administration: 2 mg (10/21/2024)  CISplatin (PLATINOL) infusion, 70 mg (original dose 40 mg/m2), Intravenous, Once, 6 of 6 cycles  Dose modification: 70 mg (original dose 40 mg/m2, Cycle 1, Reason: Anticipated Tolerance), 30 mg/m2 (original dose 40 mg/m2, Cycle 4, Reason: Neuropathy, Comment: dose reduction to 30 mg/m2 due to neuropathy)  Administration: 70 mg (9/17/2024), 70 mg (9/23/2024), 70 mg (9/30/2024), 59.1 mg (10/7/2024), 59.1 mg (10/14/2024), 59.1 mg (10/21/2024)  sodium chloride, 500 mL, Intravenous, Once, 6 of 6 cycles  Administration: 500 mL (9/17/2024), 500 mL (9/17/2024), 500 mL (9/23/2024), 500 mL (9/23/2024), 500 mL (9/30/2024), 500 mL (9/30/2024), 500 mL (10/7/2024), 500 mL (10/7/2024), 500 mL (10/14/2024), 500 mL (10/14/2024), 500 mL (10/21/2024)  fosaprepitant (EMEND) IVPB, 150 mg, Intravenous, Once, 6 of 6 cycles  Administration: 150 mg (9/17/2024), 150 mg (9/23/2024), 150 mg (9/30/2024), 150 mg (10/7/2024), 150 mg (10/14/2024), 150 mg (10/21/2024)  IVPB builder, , Intravenous, Once, 1 of 1 cycle  Administration: Unknown dose (10/21/2024)       Interval History:     Patient is a 70 year old female with metastatic colon cancer and history of tonsillar cancer (s/p chemoRT followed by tonsillectomy in October 2024). Pt's most recent PET scan from 12/27 was concerning for interval progression of hepatic metastases. Patient was started on  FOLFIRI 1/8/25, but could only tolerate 2 rounds of treatment thus far due to prolonged side effects of diarrhea and neutropenia. Last active treatment was on 1/22/25, more than one month prior. Most recent CT scan reports continued interval progression of hepatic mets with increase in size and number of lesions. There were also new and small pulmonary nodules, concerning for metastatic disease.     Today, patient presented to the office for follow-up. She is gradually improving overall. Diarrhea has resolved. She has a PEG tube in place, thru which he receives bolus feedings twice a day. She continues to have odynophagia. She has lost about 20 lbs since December. She has peripheral neuropathy involving her feet and fingers, which has been chronic since receiving treatment with platinum agents in the past.     Patient expressed that she would like to resume chemotherapy for colon cancer management.       Review of Systems   Constitutional:  Positive for appetite change (poor oral intake), fatigue and unexpected weight change (lost almost 20 lbs over last 4 months).   HENT:  Negative for mouth sores, nosebleeds, sore throat, trouble swallowing and voice change.    Eyes:  Negative for redness and visual disturbance.   Respiratory:  Negative for cough and shortness of breath.    Cardiovascular:  Negative for chest pain, palpitations and leg swelling.   Gastrointestinal:  Negative for abdominal pain, constipation, diarrhea, nausea and vomiting.   Genitourinary:  Negative for difficulty urinating, dysuria, frequency, hematuria and urgency.   Musculoskeletal:  Negative for arthralgias and back pain.   Skin:  Negative for pallor and rash.   Neurological:  Positive for weakness (generalized), light-headedness (with positional changes) and numbness (chronic neuropathy involving fingers/toes). Negative for headaches.   Hematological:  Negative for adenopathy. Does not bruise/bleed easily.   Psychiatric/Behavioral:  Negative  "for agitation and confusion.           Objective   /70 (BP Location: Left arm, Patient Position: Sitting, Cuff Size: Adult)   Pulse 102   Temp 98 °F (36.7 °C) (Temporal)   Resp 16   Ht 5' 5.98\" (1.676 m)   Wt 72.6 kg (160 lb)   SpO2 95%   BMI 25.84 kg/m²      Physical Exam  Vitals reviewed.   Constitutional:       General: She is not in acute distress.     Appearance: She is not toxic-appearing.   HENT:      Head: Normocephalic and atraumatic.      Mouth/Throat:      Mouth: Mucous membranes are moist.      Pharynx: No oropharyngeal exudate or posterior oropharyngeal erythema.   Eyes:      General: No scleral icterus.     Extraocular Movements: Extraocular movements intact.      Conjunctiva/sclera: Conjunctivae normal.   Cardiovascular:      Rate and Rhythm: Normal rate and regular rhythm.      Pulses: Normal pulses.      Heart sounds: Normal heart sounds. No murmur heard.     No gallop.   Pulmonary:      Effort: Pulmonary effort is normal. No respiratory distress.      Breath sounds: Normal breath sounds. No wheezing or rales.   Abdominal:      General: Bowel sounds are normal. There is no distension.      Palpations: Abdomen is soft. There is no mass.      Tenderness: There is no abdominal tenderness.      Comments: PEG tube in place   Musculoskeletal:      Cervical back: Normal range of motion. No rigidity.      Right lower leg: No edema.      Left lower leg: No edema.   Lymphadenopathy:      Cervical: No cervical adenopathy.   Skin:     Capillary Refill: Capillary refill takes less than 2 seconds.      Findings: No lesion or rash.   Neurological:      General: No focal deficit present.      Mental Status: She is alert and oriented to person, place, and time.       "

## 2025-02-28 NOTE — PROGRESS NOTES
Maira Moura  tolerated treatment well with no complications.  Extra labs drawn and sent per Hanane CANTU. Blood bank hold tube drawn and sent blood bank made aware this is for transfusion at [facility] on Monday.     Maira Moura is aware [place] [facility] [place]     AVS printed and given to Maira Moura:  No (Declined by Maira Moura)

## 2025-02-28 NOTE — ASSESSMENT & PLAN NOTE
Patient is a 70 year old female with metastatic colon cancer, originally dx in June 2023. Baseline CARIS testing showed KRAS G12D mutation, MSI-stable, and PD-L1 negative status. Patient was initiated on 1L treatment with FOLFOX in July 2023. Patient had completed 12 cycles of FOLFOX, after which she was on maintenance with nivolumab plus 5-FU. Patient is s/p right hemicolectomy in March 2024 as there was concern for possible partial obstruction from the colon mass. She is also s/p cryoablation of right hepatic metastatic lesion on 6/3/24. Colon cancer directed treatment was on hold since ~ August 2024 for management of tonsillar cancer with chemoRT and for prolonged clinical recovery from side effects of treatment and tonsillectomy.     Pt's most recent PET scan from 12/27 was concerning for interval progression of hepatic metastases. Patient was started on FOLFIRI 1/8/25, but could only tolerate 2 rounds of treatment thus far due to side effects of diarrhea, neutropenia, and fatigue. Last active treatment was on 1/22/25, more than one month prior. Most recent CT scan from February 2025 reports continued interval progression of hepatic mets with increase in size and number of lesions. There were also new and small pulmonary nodules, concerning for metastatic disease.     Maira presented to the office today. She has a borderline performance status at this time, ECOG 2. Diarrhea and neutropenia have resolved. She expressed interest to resume treatment again. We will plan on resuming FOLFIRI next week with 50% dose reduction in irinotecan. Will clarify with Dr. Medina whether we will be removing 5-FU bolus from future treatments.     In the meantime, we will continue supportive care with IVF. Patient knows to follow-up with nutrition team for recommendations regarding tube feedings to make sure that she is getting adequate amount of calories through her PEG tube.     Orders:    TIBC Panel (incl. Iron, TIBC, %  Iron Saturation); Future    Ferritin; Future    Vitamin B12; Future    Folate; Future    Zinc; Future    Copper Level; Future    Type and screen; Future

## 2025-02-28 NOTE — PROGRESS NOTES
Scheduled pts upcoming 3 speech therapy appointments for transportation via round trip. Spoke with pt and confirmed she has transportation for these appointments.  I instructed her to notify the speech therapy office she needs transportation so an e mail can be sent to [facility]   with the appointment dates and times. She understood, and was thankful for the assistance

## 2025-02-28 NOTE — TELEPHONE ENCOUNTER
Spoke with [facility] and scheduled patient for one unit of blood Monday. No change in time for appt Monday. BBHT will be drawn at [facility] today and sent over to [facility].

## 2025-03-01 NOTE — ASSESSMENT & PLAN NOTE
Patient's recent CBCs have been notable for persistent anemia in the 7-8 g/dL range. MCV within normal range. Likely anemia of chronic inflammation. We will check iron panel, B12, folate, and zinc levels to see if there are any nutritional deficiencies causing pt's anemia.     Supportive transfusions on an as needed basis. We have requested for for 1 unit of PRBC transfusion on Monday, 3/3/25.

## 2025-03-01 NOTE — ASSESSMENT & PLAN NOTE
Patient with history of locally advanced right tonsillar cancer dx in July 2023. She is s/p chemoRT followed by tonsillectomy in October 2024. There was no residual tumor noted on tonsillectomy specimen.     Pt's most recent PET scan from 12/27 reported interval near complete metabolic response to therapy of previously noted hypermetabolic malignancy involving the neck.    Patient ws recommended to continue close surveillance with ENT team.

## 2025-03-03 ENCOUNTER — HOSPITAL ENCOUNTER (OUTPATIENT)
Dept: INFUSION CENTER | Facility: CLINIC | Age: 70
Discharge: HOME | End: 2025-03-03
Payer: MEDICARE

## 2025-03-03 ENCOUNTER — DOCUMENTATION (OUTPATIENT)
Dept: NUTRITION | Facility: CLINIC | Age: 70
End: 2025-03-03

## 2025-03-03 VITALS
SYSTOLIC BLOOD PRESSURE: 118 MMHG | DIASTOLIC BLOOD PRESSURE: 80 MMHG | TEMPERATURE: 97.4 F | RESPIRATION RATE: 16 BRPM | HEART RATE: 79 BPM

## 2025-03-03 DIAGNOSIS — Z17.0 MALIGNANT NEOPLASM OF UPPER-OUTER QUADRANT OF RIGHT BREAST IN FEMALE, ESTROGEN RECEPTOR POSITIVE (HCC): ICD-10-CM

## 2025-03-03 DIAGNOSIS — C50.411 MALIGNANT NEOPLASM OF UPPER-OUTER QUADRANT OF RIGHT BREAST IN FEMALE, ESTROGEN RECEPTOR POSITIVE (HCC): ICD-10-CM

## 2025-03-03 DIAGNOSIS — C78.7 METASTATIC COLON CANCER TO LIVER (HCC): ICD-10-CM

## 2025-03-03 DIAGNOSIS — D64.81 ANTINEOPLASTIC CHEMOTHERAPY INDUCED ANEMIA: Primary | ICD-10-CM

## 2025-03-03 DIAGNOSIS — T45.1X5A ANTINEOPLASTIC CHEMOTHERAPY INDUCED ANEMIA: Primary | ICD-10-CM

## 2025-03-03 DIAGNOSIS — C18.9 METASTATIC COLON CANCER TO LIVER (HCC): ICD-10-CM

## 2025-03-03 LAB
ALBUMIN SERPL BCG-MCNC: 3.1 G/DL (ref 3.5–5)
ALP SERPL-CCNC: 95 U/L (ref 34–104)
ALT SERPL W P-5'-P-CCNC: 11 U/L (ref 7–52)
ANION GAP SERPL CALCULATED.3IONS-SCNC: 5 MMOL/L (ref 4–13)
AST SERPL W P-5'-P-CCNC: 32 U/L (ref 13–39)
BASOPHILS # BLD AUTO: 0.02 THOUSANDS/ÂΜL (ref 0–0.1)
BASOPHILS NFR BLD AUTO: 0 % (ref 0–1)
BILIRUB SERPL-MCNC: 0.27 MG/DL (ref 0.2–1)
BUN SERPL-MCNC: 24 MG/DL (ref 5–25)
CALCIUM ALBUM COR SERPL-MCNC: 9.9 MG/DL (ref 8.3–10.1)
CALCIUM SERPL-MCNC: 9.2 MG/DL (ref 8.4–10.2)
CHLORIDE SERPL-SCNC: 97 MMOL/L (ref 96–108)
CO2 SERPL-SCNC: 29 MMOL/L (ref 21–32)
CREAT SERPL-MCNC: 1.03 MG/DL (ref 0.6–1.3)
EOSINOPHIL # BLD AUTO: 0.12 THOUSAND/ÂΜL (ref 0–0.61)
EOSINOPHIL NFR BLD AUTO: 2 % (ref 0–6)
ERYTHROCYTE [DISTWIDTH] IN BLOOD BY AUTOMATED COUNT: 16.9 % (ref 11.6–15.1)
GFR SERPL CREATININE-BSD FRML MDRD: 55 ML/MIN/1.73SQ M
GLUCOSE SERPL-MCNC: 91 MG/DL (ref 65–140)
HCT VFR BLD AUTO: 22.9 % (ref 34.8–46.1)
HGB BLD-MCNC: 7.3 G/DL (ref 11.5–15.4)
IMM GRANULOCYTES # BLD AUTO: 0 THOUSAND/UL (ref 0–0.2)
IMM GRANULOCYTES NFR BLD AUTO: 0 % (ref 0–2)
LYMPHOCYTES # BLD AUTO: 0.54 THOUSANDS/ÂΜL (ref 0.6–4.47)
LYMPHOCYTES NFR BLD AUTO: 10 % (ref 14–44)
MAGNESIUM SERPL-MCNC: 1.8 MG/DL (ref 1.9–2.7)
MCH RBC QN AUTO: 30.5 PG (ref 26.8–34.3)
MCHC RBC AUTO-ENTMCNC: 31.9 G/DL (ref 31.4–37.4)
MCV RBC AUTO: 96 FL (ref 82–98)
MONOCYTES # BLD AUTO: 0.57 THOUSAND/ÂΜL (ref 0.17–1.22)
MONOCYTES NFR BLD AUTO: 11 % (ref 4–12)
NEUTROPHILS # BLD AUTO: 4.13 THOUSANDS/ÂΜL (ref 1.85–7.62)
NEUTS SEG NFR BLD AUTO: 77 % (ref 43–75)
NRBC BLD AUTO-RTO: 0 /100 WBCS
PLATELET # BLD AUTO: 334 THOUSANDS/UL (ref 149–390)
PMV BLD AUTO: 10.4 FL (ref 8.9–12.7)
POTASSIUM SERPL-SCNC: 4.4 MMOL/L (ref 3.5–5.3)
PROT SERPL-MCNC: 7.2 G/DL (ref 6.4–8.4)
RBC # BLD AUTO: 2.39 MILLION/UL (ref 3.81–5.12)
SODIUM SERPL-SCNC: 131 MMOL/L (ref 135–147)
WBC # BLD AUTO: 5.38 THOUSAND/UL (ref 4.31–10.16)
ZINC SERPL-MCNC: 51 UG/DL (ref 44–115)

## 2025-03-03 PROCEDURE — 83735 ASSAY OF MAGNESIUM: CPT | Performed by: INTERNAL MEDICINE

## 2025-03-03 PROCEDURE — 96365 THER/PROPH/DIAG IV INF INIT: CPT

## 2025-03-03 PROCEDURE — P9040 RBC LEUKOREDUCED IRRADIATED: HCPCS

## 2025-03-03 PROCEDURE — 85025 COMPLETE CBC W/AUTO DIFF WBC: CPT | Performed by: INTERNAL MEDICINE

## 2025-03-03 PROCEDURE — 36430 TRANSFUSION BLD/BLD COMPNT: CPT

## 2025-03-03 PROCEDURE — 80053 COMPREHEN METABOLIC PANEL: CPT | Performed by: INTERNAL MEDICINE

## 2025-03-03 RX ORDER — SODIUM CHLORIDE 9 MG/ML
20 INJECTION, SOLUTION INTRAVENOUS ONCE
Status: COMPLETED | OUTPATIENT
Start: 2025-03-03 | End: 2025-03-03

## 2025-03-03 RX ADMIN — SODIUM CHLORIDE 20 ML/HR: 0.9 INJECTION, SOLUTION INTRAVENOUS at 11:04

## 2025-03-03 RX ADMIN — MAGNESIUM SULFATE HEPTAHYDRATE: 500 INJECTION, SOLUTION INTRAMUSCULAR; INTRAVENOUS at 13:40

## 2025-03-03 NOTE — PROGRESS NOTES
Pt. Verbalized being tired and having fatigue.  Pt. Tolerated 1 unit of PRBCs, NSS with Magnesium 2 grams without adverse event. Labs obtained as ordered via port a cath.  Pt. Will return 4/5/25 as scheduled.  AVS declined.

## 2025-03-03 NOTE — PROGRESS NOTES
Met with Maira during her infusion today. She states she is doing well with her TF. I did provide her with some Banatrol samples as she's been having some loose stool. RD follow up appointment scheduled for during infusion on 3/5.

## 2025-03-04 ENCOUNTER — OFFICE VISIT (OUTPATIENT)
Dept: SPEECH THERAPY | Facility: CLINIC | Age: 70
End: 2025-03-04
Payer: MEDICARE

## 2025-03-04 DIAGNOSIS — Z90.89 STATUS POST TONSILLECTOMY: ICD-10-CM

## 2025-03-04 DIAGNOSIS — C09.9 RIGHT TONSILLAR SQUAMOUS CELL CARCINOMA (HCC): ICD-10-CM

## 2025-03-04 DIAGNOSIS — R13.12 DYSPHAGIA, OROPHARYNGEAL PHASE: Primary | ICD-10-CM

## 2025-03-04 PROCEDURE — 92526 ORAL FUNCTION THERAPY: CPT

## 2025-03-04 NOTE — PROGRESS NOTES
Daily Speech Treatment Note    Today's date: 3/4/2025  Patient’s name: Maira Moura  : 1955  MRN: [id number]  Safety measures: HTN, CVA, GERD, active CA, s/p PEG tube placement, aspiration precautions  Current recommended diet: PEG tube for primary nutrition/hydration; puree with thin liquids P.O. as tolerated  Referring provider: Harika Medina DO    Encounter Diagnosis     ICD-10-CM    1. Dysphagia, oropharyngeal phase  R13.12       2. Status post tonsillectomy  Z90.89       3. 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     Objective/Assessment:    Short-term goals:  Patient will receive a videofluoroscopic swallow study (VFSS) to assess her current swallowing function to r/o penetration or aspiration, to determine the safest, least restrictive diet, and to recommended the appropriate rehabilitation exercises (to be achieved in 2-3 weeks).     Patient will be educated on safe swallowing compensatory strategies (e.g., upright posture, small bites/sips, slow rate, alternation of consistencies, multiple swallows, effortful swallow, etc.) to facilitate increased safety with P.O. intake (to be achieved in 4-6 weeks).     Patient will tolerate P.O. trials of her recommended diet with the implementation of safe swallowing strategies without overt s/sx of penetration and/or aspiration during skilled therapy sessions in this certification period (to be achieved in 4-6 weeks).      Traditional VitalStim     Channel(s) used: 1, 2   Placement: 3a   Duty Cycle: 57/1 s   Frequency: 80 pulses per second   Phase  Duration: 300 microseconds   Treatment Duration: 40 minutes   Min mA level: 5.0 mA   Max mA level: 8.0 mA      Patient tolerated NMES in conjunction with pharyngeal/laryngeal strengthening exercises and P.O. intake. (The patient received instruction on the potential benefits from NMES treatment, including neuromuscular re-education and muscle strengthening.) Skin condition post-NMES: intact.      Patient consumed the following during today’s session: chocolate pudding and water (thin liquid) via side of cup -- NMES remained on during pharyngeal strengthening exercises (see below).      Patient without any c/o odynophagia. Patient indicated that she remembered to take her pain medication today.    Patient appeared to have adequate anterior closure, A to P transfer, bolus formation/control, and swallow initiation.  Patient was able to finish her pudding cup within the first 10 minutes of session.  Throat clear x0 and cough x1 noted during session trials (specifically with TL).  No change in vocal quality was noted. No other overt s/sx of penetration/aspiration noted during today's session. Patient implemented small bites and slow rate with P.O. intake, as well as liquid washes as needed.     Patient will perform oral motor exercises using IOPI device on lingual muscles to facilitate increased function and strength by 25% for improved swallowing function (to be achieved in 6-8 weeks).    Patient will independently complete pharyngeal strengthening exercises (e.g., Effortful swallow, Mendelsohn, Evelia) daily to facilitate increased pharyngeal strength and coordination (to be achieved in 4-6 weeks).     -Patient completed the following pharyngeal strengthening exercises during today's session:  *Effortful swallow: 2 sets of 10 reps  *Mendelsohn: 1 set of 10 reps  *Evelia: 2 sets of 10 reps    Cough noted x2 during exercises.  Patient stated she was coughing on her saliva.     Plan:  -Patient was provided with home  exercises/activities to target goals in plan of care at the end of today's session.  -Continue with current plan of care.

## 2025-03-04 NOTE — PROGRESS NOTES
Outpatient Oncology Nutrition Consultation   Type of Consult: Follow Up   Care Location: [facility]    Reason for referral: Received notification by [facility] ZAPATA on 8/21/24 that pt has triggered for oncology nutrition care (reason for referral: HNC dx and Malnutrition Screening Tool (MST) Triggers: scored a 0 indicating No recent wt loss and is not eating poorly due to a decreased appetite. 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   -7/31/2023 started on FOLFOX   -nivolumab added to cycle 9   -finished July 2024  Started chemo/RT  9/16/24 for HNC   -Cisplatin   -RT EOT 11/13/24  S/p PEG placement 10/2/24  S/p nasopharyngoscopy, left tonsillectomy 10/9/24  Colon cancer progression; started on FOLFIRI with a 50% dose reduction of the irinotecan 1/8/25  Oncology History Overview Note   2/2019 - pT2N0 breast cancer treated with anastrozole, oncotype 13, ER+ [place]+ Her2 neg     7/2023 - metastatic ascending colon adenocarcinoma to liver     Caris - KRAS G12D, CAIN, PD-L1 0%     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, unresectable     7/31/2023 - FOLFOX     11/20/2023 - opdivo added to FOLFOX     12/18/2023-cycle 11-20% dose reduction of 5-FU bolus and oxaliplatin due to increased fatigue     Jan 2024 - oxaliplatin removed from treatment plan due to neuropathy - PET scan shows good disease control in all areas except colon lesion     3/2024 - right hemicolectomy - pT3N0     6/3/24 - cryoablation to liver (no interruption to systemic therapy)     7/30/2024 - stop folfox     9/2024 - 10/2024 - cis/RT to tonsils and cervical Lns    12/2024 - disease progression of colon cancer in the liver    01/2025 - Initiated  "FOLFIRI every 14-days, with 50% dose-reduction of Irinotecan due to pre-existing diarrhea.    February 2025: systemic chemotherapy held due to patient experiencing prolonged side effects of diarrhea and neutropenia.     March 2025: resume C3 of FOLFIRI      Malignant neoplasm of right female breast (HCC)   11/23/2018 Initial Diagnosis    Malignant neoplasm of right female breast (HCC)     11/23/2018 Biopsy    Rt Breast US BX 1100 8cmfn, 4 passes 12g Marquee:  - Invasive breast carcinoma of no special type (ductal NST/invasive ductal carcinoma).  - grade 2  - %  - SD 95%  - HER2 negative     12/20/2018 Surgery    Right partial mastectomy and SLN biopsy:  Infiltrating ductal carcinoma, Grade 2/3, felt to be between 2.0 cm and 2.5 cm in greatest dimension  - No invasive tumor is seen at inked margins, but there is a focus of DCIS seen within approximately 1mm of the inked medial margin  - no LVI  - no LCIS  - 0/2 LN's  at least Stage IIA - pT2, pN0, cM0, G2.    - Dr Coronado     12/20/2018 -  Cancer Staged    Stage IIA - pT2, pN0, cM0, G2.       1/4/2019 Genomic Testing    Oncotype DX score: 13     2/1/2019 -  Hormone Therapy    anastrozole 1 mg daily as adjuvant endocrine therapy    - Dr Daley       2/14/2019 - 4/3/2019 Radiation    Course: C1    Plan ID Energy Fractions Dose per Fraction (cGy) Dose Correction (cGy) Total Dose Delivered (cGy) Elapsed Days   R Breast 10X/6X 25 / 25 200 0 5,000 40   R BRST BOOST 16E 5 / 5 200 0 1,000 7      Treatment dates:  C1: 2/14/2019 - 4/3/2019       Metastatic colon cancer to liver (HCC)   7/6/2023 Genetic Testing    CARIS Results:   KRAS Pathogenic Variant Exon 2  p.G12D : lack of benefit of cetuximab, panitumumab Level 2   No other actionable mutation; MSI stable; TMB low   MiFOLOXAi Results: \"Decreased Benefit of FOLFOX + Bevacizumab in first line metastatic CRC.  This patient may achieve improved results by receiving an alternative to FOLFOX, such as FOLFIRI, as their " "initial regimen. As an adjustment to frontline FOLFOXIRI following toxicity: This patient may achieve improved results by removing the oxaliplatin portion of their regimen\".         7/11/2023 Initial Diagnosis    Metastatic colon cancer to liver (HCC)     7/13/2023 -  Cancer Staged    Staging form: Colon and Rectum, AJCC 8th Edition  - Clinical stage from 7/13/2023: cT3, cN0, pM1 - Signed by Harika Medina DO on 9/28/2023  Total positive nodes: 0       7/31/2023 - 8/1/2024 Chemotherapy    cyanocobalamin, 1,000 mcg, Intramuscular, Every 30 days, 7 of 9 cycles  Administration: 1,000 mcg (5/9/2024), 1,000 mcg (5/24/2024), 1,000 mcg (6/20/2024), 1,000 mcg (7/3/2024), 1,000 mcg (7/18/2024), 1,000 mcg (8/1/2024)  potassium chloride, 20 mEq, Intravenous, Once, 2 of 2 cycles  Administration: 20 mEq (11/6/2023), 20 mEq (11/20/2023)  alteplase (CATHFLO), 2 mg, Intracatheter, Every 1 Minute as needed, 21 of 23 cycles  Administration: 2 mg (1/3/2024)  pegfilgrastim (NEULASTA), 6 mg, Subcutaneous, Once, 12 of 12 cycles  Administration: 6 mg (10/25/2023), 6 mg (11/8/2023), 6 mg (11/22/2023), 6 mg (12/6/2023), 6 mg (12/20/2023), 6 mg (1/12/2024), 6 mg (1/25/2024), 6 mg (4/25/2024), 6 mg (5/9/2024), 6 mg (5/24/2024), 6 mg (6/20/2024)  fluorouracil (ADRUCIL), 895 mg, Intravenous, Once, 21 of 23 cycles  Dose modification: 320 mg/m2 (original dose 400 mg/m2, Cycle 11)  Administration: 900 mg (7/31/2023), 900 mg (8/14/2023), 900 mg (8/28/2023), 900 mg (9/11/2023), 900 mg (9/25/2023), 900 mg (10/9/2023), 900 mg (10/23/2023), 895 mg (11/6/2023), 895 mg (11/20/2023), 895 mg (12/4/2023), 700 mg (12/18/2023), 680 mg (1/10/2024), 680 mg (1/23/2024), 625 mg (4/23/2024), 625 mg (5/7/2024), 625 mg (5/22/2024), 625 mg (6/4/2024), 625 mg (6/18/2024), 625 mg (7/1/2024), 625 mg (7/16/2024), 625 mg (7/30/2024)  nivolumab (OPDIVO) IVPB, 240 mg (100 % of original dose 240 mg), Intravenous, Once, 13 of 15 cycles  Dose modification: 240 mg (original " dose 240 mg, Cycle 9)  Administration: 240 mg (11/20/2023), 240 mg (12/4/2023), 240 mg (12/18/2023), 240 mg (1/10/2024), 240 mg (1/23/2024), 240 mg (4/23/2024), 240 mg (5/7/2024), 240 mg (5/22/2024), 240 mg (6/4/2024), 240 mg (6/18/2024), 240 mg (7/1/2024), 240 mg (7/16/2024), 240 mg (7/30/2024)  leucovorin calcium IVPB, 896 mg, Intravenous, Once, 21 of 23 cycles  Administration: 900 mg (7/31/2023), 900 mg (8/14/2023), 900 mg (8/28/2023), 900 mg (9/11/2023), 900 mg (9/25/2023), 900 mg (10/9/2023), 900 mg (10/23/2023), 900 mg (11/6/2023), 900 mg (11/20/2023), 900 mg (12/4/2023), 900 mg (12/18/2023), 850 mg (1/10/2024), 850 mg (1/23/2024), 800 mg (4/23/2024), 800 mg (5/7/2024), 800 mg (5/22/2024), 800 mg (6/4/2024), 800 mg (6/18/2024), 800 mg (7/1/2024), 800 mg (7/16/2024), 800 mg (7/30/2024)  oxaliplatin (ELOXATIN) chemo infusion, 85 mg/m2 = 190.4 mg, Intravenous, Once, 12 of 12 cycles  Dose modification: 68 mg/m2 (original dose 85 mg/m2, Cycle 11, Reason: Other (Must fill in a comment), Comment: increased fatigue)  Administration: 190.4 mg (7/31/2023), 190.4 mg (8/14/2023), 200 mg (8/28/2023), 200 mg (9/11/2023), 200 mg (9/25/2023), 200 mg (10/9/2023), 200 mg (10/23/2023), 200 mg (11/6/2023), 200 mg (11/20/2023), 190.4 mg (12/4/2023), 150 mg (12/18/2023), 150 mg (1/10/2024)  fluorouracil (ADRUCIL) ambulatory infusion Soln, 1,200 mg/m2/day = 5,375 mg, Intravenous, Over 46 hours, 21 of 23 cycles     9/26/2023 -  Cancer Staged    Staging form: Colon and Rectum, AJCC 8th Edition  - Pathologic: pT3, pN0, cM1 - Signed by Vickie Israel MD on 4/3/2024  Total positive nodes: 0       3/12/2024 Surgery    A. Terminal ileum, appendix, right colon (right hemicolectomy):  - Adenocarcinoma (5.2cm)  - Forty-nine (49) lymph nodes negative for carcinoma (0/49)    - Acellular mucin present in one (1) lymph node   - See staging synoptic (ypT3N0)     1/8/2025 -  Chemotherapy    alteplase (CATHFLO), 2 mg, Intracatheter, Every 1  Minute as needed, 3 of 5 cycles  pegfilgrastim (NEULASTA), 6 mg, Subcutaneous, Once, 1 of 3 cycles  fluorouracil (ADRUCIL), 400 mg/m2 = 770 mg, Intravenous, Once, 2 of 2 cycles  Administration: 770 mg (1/8/2025), 770 mg (1/22/2025)  irinotecan (CAMPTOSAR) chemo infusion, 173 mg (50 % of original dose 180 mg/m2), Intravenous, Once, 3 of 5 cycles  Dose modification: 90 mg/m2 (original dose 180 mg/m2, Cycle 1, Reason: Anticipated Tolerance, Comment: 50% dose reduction due to pre-existing diarrhea)  Administration: 180 mg (1/8/2025), 180 mg (1/22/2025)  leucovorin calcium IVPB, 768 mg, Intravenous, Once, 2 of 2 cycles  Administration: 800 mg (1/8/2025), 800 mg (1/22/2025)  fluorouracil (ADRUCIL) ambulatory infusion Soln, 1,200 mg/m2/day = 4,610 mg, Intravenous, Over 46 hours, 3 of 5 cycles     Right tonsillar squamous cell carcinoma (HCC)   7/27/2023 Initial Diagnosis    Right tonsillar squamous cell carcinoma (HCC)     7/27/2023 -  Cancer Staged    Staging form: Pharynx - Oropharynx, AJCC 8th Edition  - Clinical stage from 7/27/2023: Stage III (cT1, cN3, cM0, p16+) - Signed by Evan Plummer MD on 7/27/2023  Stage prefix: Initial diagnosis       9/16/2024 - 11/13/2024 Radiation      Plan ID Energy Fractions Dose per Fraction (cGy) Dose Correction (cGy) Total Dose Delivered (cGy) Elapsed Days   Head_Neck 6X-FFF 35 / 35 200 0 7,000 58    C2: 9/16/2024 - 11/13/2024 9/17/2024 - 10/21/2024 Chemotherapy    alteplase (CATHFLO), 2 mg, Intracatheter, Every 1 Minute as needed, 6 of 6 cycles  Administration: 2 mg (10/21/2024)  CISplatin (PLATINOL) infusion, 70 mg (original dose 40 mg/m2), Intravenous, Once, 6 of 6 cycles  Dose modification: 70 mg (original dose 40 mg/m2, Cycle 1, Reason: Anticipated Tolerance), 30 mg/m2 (original dose 40 mg/m2, Cycle 4, Reason: Neuropathy, Comment: dose reduction to 30 mg/m2 due to neuropathy)  Administration: 70 mg (9/17/2024), 70 mg (9/23/2024), 70 mg (9/30/2024), 59.1 mg  (10/7/2024), 59.1 mg (10/14/2024), 59.1 mg (10/21/2024)  sodium chloride, 500 mL, Intravenous, Once, 6 of 6 cycles  Administration: 500 mL (9/17/2024), 500 mL (9/17/2024), 500 mL (9/23/2024), 500 mL (9/23/2024), 500 mL (9/30/2024), 500 mL (9/30/2024), 500 mL (10/7/2024), 500 mL (10/7/2024), 500 mL (10/14/2024), 500 mL (10/14/2024), 500 mL (10/21/2024)  fosaprepitant (EMEND) IVPB, 150 mg, Intravenous, Once, 6 of 6 cycles  Administration: 150 mg (9/17/2024), 150 mg (9/23/2024), 150 mg (9/30/2024), 150 mg (10/7/2024), 150 mg (10/14/2024), 150 mg (10/21/2024)  IVPB builder, , Intravenous, Once, 1 of 1 cycle  Administration: Unknown dose (10/21/2024)       Past Medical & Surgical Hx:   Patient Active Problem List   Diagnosis    Primary hypertension    Mixed hyperlipidemia    Malignant neoplasm of right female breast (HCC)    Vitamin D deficiency    Prediabetes    Right thyroid nodule    GERD (gastroesophageal reflux disease)    Obesity    Metastatic colon cancer to liver (HCC)    Right tonsillar squamous cell carcinoma (HCC)    Poor appetite    Nutritional anemia    Dehydration    Drug-induced constipation    Chemotherapy induced neutropenia (HCC)    Stage 3a chronic kidney disease (HCC)    Thrombocytopenia (HCC)    Neuropathy    Diastolic dysfunction    Hypokalemia    Leukemoid reaction    GOGO (acute kidney injury) (HCC)    Acute post-operative pain    At risk for constipation    At risk for delirium    Palliative care encounter    Physical deconditioning    History of CVA (cerebrovascular accident)    Chronic nasal congestion    Elevated LFTs    Vitamin B12 deficiency    Neoplastic malignant related fatigue    Sensation, choking    S/P percutaneous endoscopic gastrostomy (PEG) tube placement (HCC)    Pancytopenia due to chemotherapy (HCC)    Cancer related pain    Hypomagnesemia    Functional diarrhea    Antineoplastic chemotherapy induced anemia    Hypertensive heart and renal disease with congestive heart failure  (HCC)     Past Medical History:   Diagnosis Date    Anemia     Arthritis     Body mass index (BMI) 40.0-44.9, adult (HCC) 9/22/2023    Breast cancer (HCC) 12/17/2018    Cancer (HCC)     right breast, colon, liver, right tonsil    Cervical lymphadenopathy 3/21/2023    CT neck on 3/30/2023- Large right level 2A lymphadenopathy as described above and suspicious for neoplasm.  Correlation with histopathology is recommended.  Mild asymmetric prominence of the right palatine tonsil with otherwise no definitive nodular enhancing lesions identified along the course of the aerodigestive tract.     5/26/23- FNA of this node was nonrevealing for tissue etiology, but it d    Encounter for screening for HIV 07/07/2022    Follow-up examination 04/04/2023    GERD (gastroesophageal reflux disease)     Hyperlipidemia     Hypertension     Obesity     Stroke (HCC)     TIA     Stroke (HCC)     TIA     Transaminitis 09/22/2021    Vasomotor rhinitis 8/9/2018    Refilled flonase today. Stopped taking since January 2022     Past Surgical History:   Procedure Laterality Date    BREAST BIOPSY Right 11/23/2018    us guided bx cancer    BREAST SURGERY      COLONOSCOPY      ESOPHAGOSCOPY N/A 07/20/2023    Procedure: ESOPHAGOSCOPY;  Surgeon: David Chapa MD;  [facility];  Service: ENT    HEMICOLOECTOMY W/ ANASTOMOSIS Right 3/12/2024    Procedure: RIGHT COLECTOMY WITH ROBOTICS;  Surgeon: Vickie Israel MD;  [facility];  Service: Surgical Oncology    IR BIOPSY LIVER MASS  06/27/2023    IR CRYOABLATION  6/3/2024    IR GASTROSTOMY TUBE PLACEMENT  10/2/2024    IR PORT CHECK  01/03/2024    IR PORT PLACEMENT  07/28/2023    IR PORT STRIPPING  01/09/2024    LAPAROTOMY N/A 3/12/2024    Procedure: LAPAROTOMY EXPLORATORY W/ ROBOTICS;  Surgeon: Vickie Israel MD;  [facility];  Service: Surgical Oncology    IA BIOPSY NASOPHARYNX VISIBLE LESION SIMPLE N/A 10/9/2024    Procedure: NASOPHARYNGOSCOPY with biospy;  Surgeon:  Juanito Matthew MD;  [facility]: [facility];  Service: ENT    MD [facility] INCL FLUOR GDNCE DX W/CELL WASHG SPX N/A 2023    Procedure: BRONCHOSCOPY;  Surgeon: David Chapa MD;  [facility];  Service: ENT    MD LARYNGOSCOPY W/BIOPSY MICROSCOPE/TELESCOPE N/A 2023    Procedure: MICRODIRECT LARYNGOSCOPY WITH BIOPSY;  Surgeon: David Chapa MD;  [facility];  Service: ENT    MD LARYNGOSCOPY W/WO TRACHEOSCOPY DX EXCEPT  N/A 10/9/2024    Procedure: DIRECT LARYNGOSCOPY;  Surgeon: Juanito Matthew MD;  [facility];  Service: ENT    MD MASTECTOMY PARTIAL W/AXILLARY LYMPHADENECTOMY Right 2018    Procedure: ULTRASOUND LOCALIZED PARTIAL MASTECTOMY W/SENTINEL NODE BIOPSY POSS AXILLARY DISSECTION;  Surgeon: Zahida Coronado MD;  Location: [facility];  Service: General    MD TONSILLECTOMY PRIMARY/SECONDARY AGE 13/> N/A 10/9/2024    Procedure: TONSILLECTOMY;  Surgeon: Juanito Matthew MD;  [facility];  Service: ENT    TUBAL LIGATION      US GUIDED BREAST BIOPSY RIGHT COMPLETE Right 2018    US GUIDED INJECTION FOR RESEARCH STUDY  2018    US GUIDED INJECTION FOR RESEARCH STUDY  2018    US GUIDED INJECTION FOR RESEARCH STUDY  2019    US GUIDED LYMPH NODE BIOPSY RIGHT  2023       Review of Medications:   Vitamins, Supplements and Herbals: No, pt denies taking supplements    Current Outpatient Medications:     acetaminophen (TYLENOL) 160 mg/5 mL liquid, Take 20 mL (640 mg total) by mouth every 6 (six) hours as needed for mild pain for up to 10 days, Disp: 473 mL, Rfl: 3    anastrozole (ARIMIDEX) 1 mg tablet, Take 1 tablet (1 mg total) by mouth daily, Disp: 90 tablet, Rfl: 0    atorvastatin (LIPITOR) 40 mg tablet, Take 1 tablet (40 mg total) by mouth daily at bedtime, Disp: 30 tablet, Rfl: 2    cholestyramine (QUESTRAN) 4 g packet, Take 1 packet (4 g total) by mouth 3 (three) times a day with meals, Disp: 90 packet, Rfl: 3     diphenhydramine, lidocaine, Al/Mg hydroxide, simethicone (Magic Mouthwash) SUSP, Swish and swallow 10 mL every 4 (four) hours as needed for mouth pain or discomfort (Patient not taking: Reported on 1/31/2025), Disp: 480 mL, Rfl: 2    docusate sodium (COLACE) 50 mg capsule, Take 1 capsule (50 mg total) by mouth 2 (two) times a day, Disp: 90 capsule, Rfl: 1    ergocalciferol (VITAMIN D2) 50,000 units, Take 1 capsule (50,000 Units total) by mouth once a week, Disp: 90 capsule, Rfl: 3    fluorouracil 4,390 mg in CADD/Elastomeric Infusion Device, Infuse 4,390 mg (1,200 mg/m2/day x 1.83 m2) into a catheter in a vein via infusion device over 46 hours for 2 days  Infusion planned for March 5, 2025., Disp: 1 each, Rfl: 0    folic acid (FOLVITE) 1 mg tablet, Take 1 tablet (1 mg total) by mouth in the morning, Disp: 90 tablet, Rfl: 3    gabapentin (NEURONTIN) 300 mg capsule, Take 1 pills in the morning, 1 pill at lunch and 2 pills before bed, Disp: 120 capsule, Rfl: 0    hydrocortisone 1 % ointment, , Disp: , Rfl:     ibuprofen (MOTRIN) 100 mg/5 mL suspension, Take 20 mL (400 mg total) by mouth every 6 (six) hours as needed for moderate pain (Patient not taking: Reported on 1/31/2025), Disp: 473 mL, Rfl: 0    lidocaine-prilocaine (EMLA) cream, Apply topically as needed for mild pain (Patient not taking: Reported on 12/6/2024), Disp: 30 g, Rfl: 3    losartan (COZAAR) 50 mg tablet, Take 1 tablet (50 mg total) by mouth daily, Disp: 90 tablet, Rfl: 5    magnesium Oxide (MAG-OX) 400 mg TABS, 1 tablet (400 mg total) by Per G Tube route 2 (two) times a day (Patient not taking: Reported on 1/31/2025), Disp: 60 tablet, Rfl: 0    mirtazapine (REMERON) 15 mg tablet, Take 1 tablet (15 mg total) by mouth daily at bedtime Patient is also on a 30 mg tablet, for a total dose of 45 mg, Disp: 30 tablet, Rfl: 0    mirtazapine (REMERON) 30 mg tablet, Take 1 tablet (30 mg total) by mouth daily at bedtime, Disp: 30 tablet, Rfl: 0    nystatin  (MYCOSTATIN) 500,000 units/5 mL suspension, Apply 5 mL (500,000 Units total) to the mouth or throat 4 (four) times a day, Disp: 600 mL, Rfl: 3    omeprazole (PriLOSEC) 20 mg delayed release capsule, Take 1 capsule (20 mg total) by mouth daily, Disp: 30 capsule, Rfl: 0    ondansetron (ZOFRAN) 8 mg tablet, Take 1 tablet (8 mg total) by mouth every 8 (eight) hours as needed for nausea or vomiting, Disp: 90 tablet, Rfl: 0    oxyCODONE (ROXICODONE) 5 mg/5 mL solution, Take 5 mL (5 mg total) by mouth every 6 (six) hours as needed for severe pain ((breakthrough pain)) Max Daily Amount: 20 mg, Disp: 473 mL, Rfl: 0    potassium chloride (Klor-Con M20) 20 mEq tablet, Take 1 tablet (20 mEq total) by mouth 2 (two) times a day (Patient not taking: Reported on 1/31/2025), Disp: 90 tablet, Rfl: 1    potassium chloride 10% oral solution, 15 mL (20 mEq total) by Per G Tube route 2 (two) times a day, Disp: 473 mL, Rfl: 0    prochlorperazine (COMPAZINE) 10 mg tablet, Take 1 tablet (10 mg total) by mouth every 6 (six) hours as needed for nausea or vomiting, Disp: 90 tablet, Rfl: 0    pyridoxine (VITAMIN B6) 50 mg tablet, Take 1 tablet (50 mg total) by mouth daily, Disp: 90 tablet, Rfl: 3    vitamin B-12 (VITAMIN B-12) 1,000 mcg tablet, , Disp: , Rfl:   No current facility-administered medications for this visit.    Facility-Administered Medications Ordered in Other Visits:     alteplase (CATHFLO) injection 2 mg, 2 mg, Intracatheter, Q1MIN PRN, Harika Agostino, DO    alteplase (CATHFLO) injection 2 mg, 2 mg, Intracatheter, Q1MIN PRN, Harika Agostino, DO    alteplase (CATHFLO) injection 2 mg, 2 mg, Intracatheter, Q1MIN PRN, Harika Agostino, DO    alteplase (CATHFLO) injection 2 mg, 2 mg, Intracatheter, Q1MIN PRN, Harika Agostino, DO    Atropine Sulfate injection 0.25 mg, 0.25 mg, Intravenous, Once, Harika Agostino, DO    Atropine Sulfate injection 0.25 mg, 0.25 mg, Intravenous, Once PRN, Harika Agostino, DO    irinotecan (CAMPTOSAR) 160  "mg in sodium chloride 0.9 % 500 mL chemo infusion, 160 mg, Intravenous, Once, Harika Agostino, DO    magnesium sulfate 2 g in sodium chloride 0.9 % 1,000 mL IV, , Intravenous, Once PRN, Harika Agostino, DO    ondansetron (ZOFRAN) 16 mg, dexamethasone (DECADRON) 10 mg in sodium chloride 0.9 % 50 mL IVPB, , Intravenous, Once, Harika Agostino, DO    sodium chloride 0.9 % bolus 1,000 mL, 1,000 mL, Intravenous, Once PRN, Harika Agostino, DO    sodium chloride 0.9 % infusion, 20 mL/hr, Intravenous, Once, Harika Agostino, DO    Most Recent Lab Results:   Lab Results   Component Value Date    WBC 5.38 03/03/2025    NEUTROABS 4.13 03/03/2025    IRON 21 (L) 02/28/2025    TIBC 170.8 (L) 02/28/2025    FERRITIN 712 (H) 02/28/2025    CHOLESTEROL 167 04/24/2024    TRIG 137 04/24/2024    HDL 43 (L) 04/24/2024    LDLCALC 97 04/24/2024    ALT 11 03/03/2025    AST 32 03/03/2025    ALB 3.1 (L) 03/03/2025    SODIUM 131 (L) 03/03/2025    SODIUM 133 (L) 02/28/2025    K 4.4 03/03/2025    K 4.3 02/28/2025    CL 97 03/03/2025    BUN 24 03/03/2025    BUN 19 02/28/2025    CREATININE 1.03 03/03/2025    CREATININE 1.01 02/28/2025    EGFR 55 03/03/2025    PHOS 2.8 11/01/2024    PHOS 3.3 10/30/2024    TSH 1.58 01/03/2022    GLUCOSE 209 (H) 03/12/2024    POCGLU 89 12/27/2024    GLUF 102 (H) 10/30/2024    GLUF 95 09/13/2024    GLUC 91 03/03/2025    HGBA1C 5.5 02/21/2024    HGBA1C 5.9 (H) 06/13/2023    HGBA1C 6.1 (H) 07/09/2022    CALCIUM 9.2 03/03/2025    FOLATE 14.1 02/28/2025    MG 1.8 (L) 03/03/2025       Anthropometric Measurements:   Height: 66\"  Ht Readings from Last 1 Encounters:   03/05/25 5' 5.98\" (1.676 m)     Wt Readings from Last 20 Encounters:   03/05/25 73.1 kg (161 lb 2.5 oz)   02/28/25 72.6 kg (160 lb)   02/18/25 73 kg (161 lb)   02/04/25 73.4 kg (161 lb 12.8 oz)   01/31/25 75.1 kg (165 lb 9.6 oz)   01/30/25 75.6 kg (166 lb 10.7 oz)   01/22/25 75.6 kg (166 lb 10.7 oz)   01/10/25 77.1 kg (170 lb)   01/08/25 76.5 kg (168 lb 10.4 oz) "   12/31/24 82.6 kg (182 lb)   12/20/24 82.1 kg (181 lb)   12/19/24 84.4 kg (186 lb)   12/06/24 84.4 kg (186 lb)   11/26/24 82.2 kg (181 lb 4 oz)   10/29/24 86.2 kg (190 lb)   10/24/24 86.6 kg (190 lb 14.7 oz)   10/21/24 86.6 kg (190 lb 14.7 oz)   10/15/24 88.5 kg (195 lb)   10/14/24 87.1 kg (192 lb)   10/10/24 89.3 kg (196 lb 13.9 oz)       Weight History:   Usual Weight: 275#  Varian: (2/22/19) 264.6#, (4/2/19) 263.4, (9/16/24) 197.4#, (9/23/24) 194#, (9/30/24) 192.8#, (10/7/24) 190.2#, (10/11/24) 194.6#, (10/14/24) 191#, (10/17/24) 192.8#, (10/21/24) 190#, (10/24/24) 190#, (10/28/24) 189#, (11/11/24) 185#  Home Scale: n/a    Oncology Nutrition-Anthropometrics      Flowsheet Row Nutrition from 3/5/2025 in [facility] Nutrition from 2/19/2025 in [facility]   Patient [age] (years): 70 years 70 years   Patient (female) height (in): 66 in 66 in   Current Weight to be used for anthropometric calculations (lbs) 161.2 lbs 161 lbs   Current Weight to be used for anthropometric calculations (kg) 73.3 kg 73.2 kg   BMI: 26 26   IBW female: 130 lbs 130 lbs   IBW (kg) female: 59.1 kg 59.1 kg   IBW % (female) 124 % 123.8 %   Adjusted BW (female): 137.8 lbs 137.8 lbs   Adjusted BW kg (female): 62.6 kg 62.6 kg   % weight change after 1 week: 0.8 % --   Weight change after 1 week (lbs) 1.2 lbs --   % weight change after 1 month: -0.4 % -3.4 %   Weight change after 1 month (lbs) -0.6 lbs -5.7 lbs   % weight change after 3 months: -13.3 % -11.2 %   Weight change after 3 months (lbs) -24.8 lbs -20.3 lbs            Nutrition-Focused Physical Findings: none observed    Food/Nutrition-Related History & Client/Social History:    Current Nutrition Impact Symptoms:  [] Nausea  [x] Reduced Appetite  [] Acid Reflux    [] Vomiting  [x] Unintended Wt Loss stable over the last month [] Malabsorption    [x] Diarrhea -improving - using banatrol [] Unintended Wt Gain  []  Dumping Syndrome    [x] Constipation -no BM over the last 2 days [] Thick Mucous/Secretions  [] Abdominal Pain    [] Dysgeusia (Altered Taste)  [x] Xerostomia (Dry Mouth)  [] Gas    [] Dysosmia (Altered Smell)  [] Thrush  [] Difficulty Chewing    [] Oral Mucositis (Sore Mouth)  [x] Fatigue  [] Hyperglycemia   Lab Results   Component Value Date    HGBA1C 5.5 2024      [x] Odynophagia  [] Esophagitis  [] Other:    [x] Dysphagia -enteral nutrition as 1' source of nutrition  Per SLP visit 3/4/25- pureed diet with thin liquids [] Early Satiety  [] No Problems Eating      Food Allergies & Intolerances: no    Current Diet: Tube Feeding  Current Nutrition Intake: Unchanged from last visit  Appetite: Fair   Nutrition Route: PEG as primary source  Oral Care: brushes daily  Activity level: ADLs.     24 Hr Diet Recall:   Chocolate pudding    Beverages: water (sips throughout the day- no longer using thickener packets)  Supplements:   Ensure Complete (10 oz, 350 kcal, 30 g pro) -twice daily, usually puts this through PEG    EN Recall:  DME: [facility]  Access Type: PEG  Formula: [employer] Peptide 1.5  Method: Bolus  Regimen: 11oz (325 mL) 2.5  times per day of [employer] 1.5 via PEG (gravity syringe method to administer boluses; 1 container infusing over ~15-20 minutes).  Flush: 60 mL free water flush  pre/post each bolus (120 mL x 3 boluses = 360 mL)  EN providin mL volume (2.5 cartons/day), 1250 kcal (54% est needs), 60g protein (65% est needs), 569 mL free water, 929 mL total water (40% est needs).  Pt also flushing 1-2 syringes every few hours for added hydration. Pt gets IVF 3x/week.       Oncology Nutrition-Estimated Needs      Flowsheet Row Nutrition from 2025 in [facility] Nutrition from 2024 in [facility]   Weight type used Actual weight Adjusted weight   Weight in kilograms (kg) used for estimated needs 77.3 kg 66.4  kg   Energy needs formula:  30-35 kcal/kg 30-35 kcal/kg   Energy needs based on 30 kcal/k kcal  kcal   Energy needs based on 35 kcal/k kcal 2323 kcal   Protein needs formula: 1.2-1.5 g/kg 1.2-1.5 g/kg   Protein needs based on 1.2 g/k g 80 g   Protein needs based on 1.5 g/kg 116 g 100 g   Fluid needs formula: 30-35 mL/kg 30-35 mL/kg   Fluid needs based on 30 mL/kg 2319 mL  mL   Fluid needs in ounces 78 oz 67 oz   Fluid needs based on 35 mL/kg 2706 mL 2324 mL   Fluid needs in ounces 91 oz 79 oz             Discussion & Intervention:   Maira was evaluated today for an RD follow up regarding HNC and enteral nutrition.  Maira has completed tx for HNC and is currently undergoing tx for metastatic colon cancer .  Met. with Maira in the infusion center today. She continues to work with SLP- she's cleared for pureed foods and thin liquids. She reports eating some chocolate pudding, but not much else. She is drinking water throughout the day. She continues to use her PEG for the majority of her nutrition. She has been tolerating [employer] 1.5 and is doing about 2.5 cartons/day. Discussed that her goal is 4 cartons/day. She is also putting Ensure complete through her PEG usually twice daily. Encouraged her to increase [employer] to 3 times daily, and try drinking the Ensure. Her weight has been stable over the last month. She reports diarrhea has been better with use of banatrol, but she hasn't gotten her order through [employer] yet d/t the high copay. Provided her with some banatrol samples today. She does report some constipation now and hasn't had a BM in 2 days. Recommended she hold off on the banatrol until if/when she is experiencing loose stool again.   Reviewed 24 hour recall, which revealed an inadequate enteral intake, and discussed ways to optimize nutrient intake.  Also reviewed the importance of wt management throughout the tx process and the role of enteral nutrition in  managing wt and overall health.  Based on today's assessment, discussion included: MNT for:   fluid, pureed diet,  enteral nutrition, practicing proper oral care, adequate hydration & fluid choices, utilizing oral nutrition supplements, practicing proper feeding tube use & care, adherence to and compliance with enteral nutrition plan of care, and individualized enteral nutrition recommendations & plan:   .   Moving forward, Maira was encouraged to increase TF to goal    Materials Provided:  gabriela samples  All questions and concerns addressed during today’s visit.  Maira has RD contact information.    Nutrition Diagnosis:   Increased Nutrient Needs (kcal & pro) related to increased demand for nutrients and disease state as evidenced by cancer dx and pt undergoing tx for cancer.  Swallowing Difficulty related to diagnosis/treatment related causes as evidenced by  need for feeding tube, diet restriction per SLP.  Monitoring & Evaluation:   Goals:  weight maintenance/stabilization  adequate nutrition impact symptom management  pt to meet >/=75% estimated nutrition needs daily  achieve tube feeding goal rate    Progress Towards Goals: Progressing    Nutrition Rx & Recommendations:  Diet: enteral nutrition as 1' source of nutrition  Stay hydrated by sipping fluids of choice/tolerance throughout the day. Thickened liquids  Follow proper oral care; Try baking soda/salt water rinse recipe (mix 3/4 tsp salt + 1 tsp baking soda + 1 qt water; rinse with plain water after using) in Eating Hints book (pg 18).  Brush your teeth before/after meals & before bed.  Weigh yourself regularly. If you notice weight loss, make an effort to increase your daily food/calorie intake. If you continue to notice loss after these efforts, reach out to your dietitian to establish a plan to stabilize weight.   Continue with Ensure Plus/Complete 1-2 times daily. Can try drinking orally, or use through PEG if tolerated better  TF plan -   TF  Goal:  Continue [employer] 1.5, Increase this week to 3 times per day via PEG, increasing to 4 cartons per day as tolerated   Water Flushin-120 mL free water flush pre/post each bolus (480 mL water) + additional 120 mL water flushes 4 times daily (480 mL water); adjusting prn on IV hydration days  Goal TF provides: 1300 mL total volume (4 cartons), 2000 kcal, 96g protein, 910 mL free water, 1870 mL total fluid   Contact RD for substitutions  use 1 packet Banatrol TID after each feeding to assist with loose stools (if experiencing)   Call [facility] when you have 10 days left of TF supplies: [phone], option 3 (customer support).    Follow Up Plan:  during infusion on 3/19/25  Recommend Referral to Other Providers: none at this time

## 2025-03-05 ENCOUNTER — NUTRITION (OUTPATIENT)
Dept: NUTRITION | Facility: CLINIC | Age: 70
End: 2025-03-05

## 2025-03-05 ENCOUNTER — HOSPITAL ENCOUNTER (OUTPATIENT)
Dept: INFUSION CENTER | Facility: CLINIC | Age: 70
Discharge: HOME | End: 2025-03-05
Payer: MEDICARE

## 2025-03-05 ENCOUNTER — PATIENT OUTREACH (OUTPATIENT)
Dept: HEMATOLOGY ONCOLOGY | Facility: CLINIC | Age: 70
End: 2025-03-05

## 2025-03-05 VITALS
WEIGHT: 161.16 LBS | HEART RATE: 94 BPM | SYSTOLIC BLOOD PRESSURE: 113 MMHG | DIASTOLIC BLOOD PRESSURE: 74 MMHG | TEMPERATURE: 96.5 F | BODY MASS INDEX: 25.9 KG/M2 | RESPIRATION RATE: 16 BRPM | HEIGHT: 66 IN

## 2025-03-05 DIAGNOSIS — C18.9 METASTATIC COLON CANCER TO LIVER (HCC): ICD-10-CM

## 2025-03-05 DIAGNOSIS — Z71.3 NUTRITIONAL COUNSELING: Primary | ICD-10-CM

## 2025-03-05 DIAGNOSIS — Z17.0 MALIGNANT NEOPLASM OF UPPER-OUTER QUADRANT OF RIGHT BREAST IN FEMALE, ESTROGEN RECEPTOR POSITIVE (HCC): Primary | ICD-10-CM

## 2025-03-05 DIAGNOSIS — C50.411 MALIGNANT NEOPLASM OF UPPER-OUTER QUADRANT OF RIGHT BREAST IN FEMALE, ESTROGEN RECEPTOR POSITIVE (HCC): Primary | ICD-10-CM

## 2025-03-05 DIAGNOSIS — C78.7 METASTATIC COLON CANCER TO LIVER (HCC): ICD-10-CM

## 2025-03-05 LAB
ALBUMIN SERPL BCG-MCNC: 3.2 G/DL (ref 3.5–5)
ALP SERPL-CCNC: 89 U/L (ref 34–104)
ALT SERPL W P-5'-P-CCNC: 11 U/L (ref 7–52)
ANION GAP SERPL CALCULATED.3IONS-SCNC: 6 MMOL/L (ref 4–13)
AST SERPL W P-5'-P-CCNC: 34 U/L (ref 13–39)
BASOPHILS # BLD AUTO: 0.01 THOUSANDS/ÂΜL (ref 0–0.1)
BASOPHILS NFR BLD AUTO: 0 % (ref 0–1)
BILIRUB SERPL-MCNC: 0.46 MG/DL (ref 0.2–1)
BUN SERPL-MCNC: 22 MG/DL (ref 5–25)
CALCIUM ALBUM COR SERPL-MCNC: 9.7 MG/DL (ref 8.3–10.1)
CALCIUM SERPL-MCNC: 9.1 MG/DL (ref 8.4–10.2)
CHLORIDE SERPL-SCNC: 94 MMOL/L (ref 96–108)
CO2 SERPL-SCNC: 28 MMOL/L (ref 21–32)
CREAT SERPL-MCNC: 1.03 MG/DL (ref 0.6–1.3)
EOSINOPHIL # BLD AUTO: 0.07 THOUSAND/ÂΜL (ref 0–0.61)
EOSINOPHIL NFR BLD AUTO: 1 % (ref 0–6)
ERYTHROCYTE [DISTWIDTH] IN BLOOD BY AUTOMATED COUNT: 15.7 % (ref 11.6–15.1)
GFR SERPL CREATININE-BSD FRML MDRD: 55 ML/MIN/1.73SQ M
GLUCOSE SERPL-MCNC: 103 MG/DL (ref 65–140)
HCT VFR BLD AUTO: 26.9 % (ref 34.8–46.1)
HGB BLD-MCNC: 8.7 G/DL (ref 11.5–15.4)
IMM GRANULOCYTES # BLD AUTO: 0.01 THOUSAND/UL (ref 0–0.2)
IMM GRANULOCYTES NFR BLD AUTO: 0 % (ref 0–2)
LYMPHOCYTES # BLD AUTO: 0.53 THOUSANDS/ÂΜL (ref 0.6–4.47)
LYMPHOCYTES NFR BLD AUTO: 7 % (ref 14–44)
MAGNESIUM SERPL-MCNC: 1.9 MG/DL (ref 1.9–2.7)
MCH RBC QN AUTO: 30.5 PG (ref 26.8–34.3)
MCHC RBC AUTO-ENTMCNC: 32.3 G/DL (ref 31.4–37.4)
MCV RBC AUTO: 94 FL (ref 82–98)
MONOCYTES # BLD AUTO: 0.89 THOUSAND/ÂΜL (ref 0.17–1.22)
MONOCYTES NFR BLD AUTO: 12 % (ref 4–12)
NEUTROPHILS # BLD AUTO: 5.73 THOUSANDS/ÂΜL (ref 1.85–7.62)
NEUTS SEG NFR BLD AUTO: 80 % (ref 43–75)
NRBC BLD AUTO-RTO: 0 /100 WBCS
PLATELET # BLD AUTO: 312 THOUSANDS/UL (ref 149–390)
PMV BLD AUTO: 9.9 FL (ref 8.9–12.7)
POTASSIUM SERPL-SCNC: 4.5 MMOL/L (ref 3.5–5.3)
PROT SERPL-MCNC: 7.3 G/DL (ref 6.4–8.4)
RBC # BLD AUTO: 2.85 MILLION/UL (ref 3.81–5.12)
SODIUM SERPL-SCNC: 128 MMOL/L (ref 135–147)
WBC # BLD AUTO: 7.24 THOUSAND/UL (ref 4.31–10.16)

## 2025-03-05 PROCEDURE — G0498 CHEMO EXTEND IV INFUS W/PUMP: HCPCS

## 2025-03-05 PROCEDURE — 80053 COMPREHEN METABOLIC PANEL: CPT | Performed by: INTERNAL MEDICINE

## 2025-03-05 PROCEDURE — 83735 ASSAY OF MAGNESIUM: CPT | Performed by: INTERNAL MEDICINE

## 2025-03-05 PROCEDURE — 96375 TX/PRO/DX INJ NEW DRUG ADDON: CPT

## 2025-03-05 PROCEDURE — 96367 TX/PROPH/DG ADDL SEQ IV INF: CPT

## 2025-03-05 PROCEDURE — 85025 COMPLETE CBC W/AUTO DIFF WBC: CPT | Performed by: INTERNAL MEDICINE

## 2025-03-05 PROCEDURE — 96368 THER/DIAG CONCURRENT INF: CPT

## 2025-03-05 PROCEDURE — 96413 CHEMO IV INFUSION 1 HR: CPT

## 2025-03-05 RX ORDER — SODIUM CHLORIDE 9 MG/ML
20 INJECTION, SOLUTION INTRAVENOUS ONCE
Status: COMPLETED | OUTPATIENT
Start: 2025-03-05 | End: 2025-03-05

## 2025-03-05 RX ORDER — ATROPINE SULFATE 1 MG/ML
0.25 INJECTION, SOLUTION INTRAVENOUS ONCE
Status: COMPLETED | OUTPATIENT
Start: 2025-03-05 | End: 2025-03-05

## 2025-03-05 RX ORDER — ATROPINE SULFATE 1 MG/ML
0.25 INJECTION, SOLUTION INTRAVENOUS ONCE AS NEEDED
Status: DISCONTINUED | OUTPATIENT
Start: 2025-03-05 | End: 2025-03-08 | Stop reason: HOSPADM

## 2025-03-05 RX ADMIN — IRINOTECAN HYDROCHLORIDE 160 MG: 20 INJECTION, SOLUTION INTRAVENOUS at 13:23

## 2025-03-05 RX ADMIN — ATROPINE SULFATE 0.25 MG: 1 INJECTION, SOLUTION INTRAVENOUS at 13:21

## 2025-03-05 RX ADMIN — SODIUM CHLORIDE 20 ML/HR: 0.9 INJECTION, SOLUTION INTRAVENOUS at 12:55

## 2025-03-05 RX ADMIN — MAGNESIUM SULFATE HEPTAHYDRATE: 500 INJECTION, SOLUTION INTRAMUSCULAR; INTRAVENOUS at 13:23

## 2025-03-05 RX ADMIN — DEXAMETHASONE SODIUM PHOSPHATE: 10 INJECTION, SOLUTION INTRAMUSCULAR; INTRAVENOUS at 13:00

## 2025-03-05 NOTE — PLAN OF CARE
Problem: INFECTION - ADULT  Goal: Absence or prevention of progression during hospitalization  Description: INTERVENTIONS:  - Assess and monitor for signs and symptoms of infection  - Monitor lab/diagnostic results  - Monitor all insertion sites, i.e. indwelling lines, tubes, and drains  - Monitor endotracheal if appropriate and nasal secretions for changes in amount and color  - [place] appropriate cooling/warming therapies per order  - Administer medications as ordered  - Instruct and encourage patient and family to use good hand hygiene technique  - Identify and instruct in appropriate isolation precautions for identified infection/condition  3/5/2025 1338 by Enedina Brown RN  Outcome: Progressing  3/5/2025 1338 by Enedina Brown RN  Outcome: Progressing  Goal: Absence of fever/infection during neutropenic period  Description: INTERVENTIONS:  - Monitor WBC    3/5/2025 1338 by Enedina Brown RN  Outcome: Progressing  3/5/2025 1338 by Enedina Brown RN  Outcome: Progressing     Problem: Knowledge Deficit  Goal: Patient/family/caregiver demonstrates understanding of disease process, treatment plan, medications, and discharge instructions  Description: Complete learning assessment and assess knowledge base.  Interventions:  - Provide teaching at level of understanding  - Provide teaching via preferred learning methods  3/5/2025 1338 by Enedina Brown RN  Outcome: Progressing  3/5/2025 1338 by Enedina Brown RN  Outcome: Progressing

## 2025-03-05 NOTE — PATIENT INSTRUCTIONS
Nutrition Rx & Recommendations:  Diet: enteral nutrition as 1' source of nutrition  Stay hydrated by sipping fluids of choice/tolerance throughout the day. Thickened liquids  Follow proper oral care; Try baking soda/salt water rinse recipe (mix 3/4 tsp salt + 1 tsp baking soda + 1 qt water; rinse with plain water after using) in Eating Hints book (pg 18).  Brush your teeth before/after meals & before bed.  Weigh yourself regularly. If you notice weight loss, make an effort to increase your daily food/calorie intake. If you continue to notice loss after these efforts, reach out to your dietitian to establish a plan to stabilize weight.   Continue with Ensure Plus/Complete 1-2 times daily. Can try drinking orally, or use through PEG if tolerated better  TF plan -   TF Goal:  Continue [employer] 1.5, Increase this week to 3 times per day via PEG, increasing to 4 cartons per day as tolerated   Water Flushin-120 mL free water flush pre/post each bolus (480 mL water) + additional 120 mL water flushes 4 times daily (480 mL water); adjusting prn on IV hydration days  Goal TF provides: 1300 mL total volume (4 cartons), 2000 kcal, 96g protein, 910 mL free water, 1870 mL total fluid   Contact RD for substitutions  use 1 packet Banatrol TID after each feeding to assist with loose stools (if experiencing)   Call [facility] when you have 10 days left of TF supplies: [phone], option 3 (customer support).    Follow Up Plan: during infusion on 3/19/25  Recommend Referral to Other Providers: none at this time

## 2025-03-05 NOTE — PLAN OF CARE
Problem: INFECTION - ADULT  Goal: Absence or prevention of progression during hospitalization  Description: INTERVENTIONS:  - Assess and monitor for signs and symptoms of infection  - Monitor lab/diagnostic results  - Monitor all insertion sites, i.e. indwelling lines, tubes, and drains  - Monitor endotracheal if appropriate and nasal secretions for changes in amount and color  - [place] appropriate cooling/warming therapies per order  - Administer medications as ordered  - Instruct and encourage patient and family to use good hand hygiene technique  - Identify and instruct in appropriate isolation precautions for identified infection/condition  Outcome: Progressing  Goal: Absence of fever/infection during neutropenic period  Description: INTERVENTIONS:  - Monitor WBC    Outcome: Progressing     Problem: Knowledge Deficit  Goal: Patient/family/caregiver demonstrates understanding of disease process, treatment plan, medications, and discharge instructions  Description: Complete learning assessment and assess knowledge base.  Interventions:  - Provide teaching at level of understanding  - Provide teaching via preferred learning methods  Outcome: Progressing

## 2025-03-05 NOTE — PROGRESS NOTES
Pt denied new symptoms or concerns today. Labs reviewed.  Received ok to treat with Hgb 7.3.  Other parameters met.  Irinotecan, 5FU BRANDIE ordered along with hydration with Magnesium repletion.

## 2025-03-05 NOTE — PROGRESS NOTES
Pt. Tolerated Irinotecan,  and NSS with 2 grams of Magnesium without adverse event.  5Fu will infuse over next 46 hours and is scheduled for pump disconnect on 3/5/25 at 1330. AVS declined. Round trip  completed for today and also for trip on Friday.  AVS declined.

## 2025-03-06 ENCOUNTER — APPOINTMENT (OUTPATIENT)
Dept: SPEECH THERAPY | Facility: CLINIC | Age: 70
End: 2025-03-06
Payer: MEDICARE

## 2025-03-06 LAB — COPPER SERPL-MCNC: 91 UG/DL (ref 80–158)

## 2025-03-07 ENCOUNTER — HOSPITAL ENCOUNTER (OUTPATIENT)
Dept: INFUSION CENTER | Facility: CLINIC | Age: 70
End: 2025-03-07
Payer: MEDICARE

## 2025-03-07 VITALS — TEMPERATURE: 96.9 F

## 2025-03-07 DIAGNOSIS — Z17.0 MALIGNANT NEOPLASM OF UPPER-OUTER QUADRANT OF RIGHT BREAST IN FEMALE, ESTROGEN RECEPTOR POSITIVE (HCC): Primary | ICD-10-CM

## 2025-03-07 DIAGNOSIS — C78.7 METASTATIC COLON CANCER TO LIVER (HCC): ICD-10-CM

## 2025-03-07 DIAGNOSIS — C18.9 METASTATIC COLON CANCER TO LIVER (HCC): ICD-10-CM

## 2025-03-07 DIAGNOSIS — C50.411 MALIGNANT NEOPLASM OF UPPER-OUTER QUADRANT OF RIGHT BREAST IN FEMALE, ESTROGEN RECEPTOR POSITIVE (HCC): Primary | ICD-10-CM

## 2025-03-07 LAB
ALBUMIN SERPL BCG-MCNC: 3.1 G/DL (ref 3.5–5)
ALP SERPL-CCNC: 86 U/L (ref 34–104)
ALT SERPL W P-5'-P-CCNC: 11 U/L (ref 7–52)
ANION GAP SERPL CALCULATED.3IONS-SCNC: 8 MMOL/L (ref 4–13)
AST SERPL W P-5'-P-CCNC: 28 U/L (ref 13–39)
BASOPHILS # BLD AUTO: 0.01 THOUSANDS/ÂΜL (ref 0–0.1)
BASOPHILS NFR BLD AUTO: 0 % (ref 0–1)
BILIRUB SERPL-MCNC: 0.32 MG/DL (ref 0.2–1)
BUN SERPL-MCNC: 24 MG/DL (ref 5–25)
CALCIUM ALBUM COR SERPL-MCNC: 9.7 MG/DL (ref 8.3–10.1)
CALCIUM SERPL-MCNC: 9 MG/DL (ref 8.4–10.2)
CHLORIDE SERPL-SCNC: 99 MMOL/L (ref 96–108)
CO2 SERPL-SCNC: 27 MMOL/L (ref 21–32)
CREAT SERPL-MCNC: 0.97 MG/DL (ref 0.6–1.3)
EOSINOPHIL # BLD AUTO: 0.03 THOUSAND/ÂΜL (ref 0–0.61)
EOSINOPHIL NFR BLD AUTO: 1 % (ref 0–6)
ERYTHROCYTE [DISTWIDTH] IN BLOOD BY AUTOMATED COUNT: 15.9 % (ref 11.6–15.1)
GFR SERPL CREATININE-BSD FRML MDRD: 59 ML/MIN/1.73SQ M
GLUCOSE SERPL-MCNC: 99 MG/DL (ref 65–140)
HCT VFR BLD AUTO: 28.5 % (ref 34.8–46.1)
HGB BLD-MCNC: 9.1 G/DL (ref 11.5–15.4)
IMM GRANULOCYTES # BLD AUTO: 0.12 THOUSAND/UL (ref 0–0.2)
IMM GRANULOCYTES NFR BLD AUTO: 2 % (ref 0–2)
LYMPHOCYTES # BLD AUTO: 0.53 THOUSANDS/ÂΜL (ref 0.6–4.47)
LYMPHOCYTES NFR BLD AUTO: 8 % (ref 14–44)
MAGNESIUM SERPL-MCNC: 2 MG/DL (ref 1.9–2.7)
MCH RBC QN AUTO: 30.3 PG (ref 26.8–34.3)
MCHC RBC AUTO-ENTMCNC: 31.9 G/DL (ref 31.4–37.4)
MCV RBC AUTO: 95 FL (ref 82–98)
MONOCYTES # BLD AUTO: 0.28 THOUSAND/ÂΜL (ref 0.17–1.22)
MONOCYTES NFR BLD AUTO: 4 % (ref 4–12)
NEUTROPHILS # BLD AUTO: 5.38 THOUSANDS/ÂΜL (ref 1.85–7.62)
NEUTS SEG NFR BLD AUTO: 85 % (ref 43–75)
NRBC BLD AUTO-RTO: 0 /100 WBCS
PLATELET # BLD AUTO: 364 THOUSANDS/UL (ref 149–390)
PMV BLD AUTO: 10 FL (ref 8.9–12.7)
POTASSIUM SERPL-SCNC: 3.9 MMOL/L (ref 3.5–5.3)
PROT SERPL-MCNC: 7.3 G/DL (ref 6.4–8.4)
RBC # BLD AUTO: 3 MILLION/UL (ref 3.81–5.12)
SODIUM SERPL-SCNC: 134 MMOL/L (ref 135–147)
WBC # BLD AUTO: 6.35 THOUSAND/UL (ref 4.31–10.16)

## 2025-03-07 PROCEDURE — 96372 THER/PROPH/DIAG INJ SC/IM: CPT

## 2025-03-07 PROCEDURE — 80053 COMPREHEN METABOLIC PANEL: CPT | Performed by: INTERNAL MEDICINE

## 2025-03-07 PROCEDURE — 96365 THER/PROPH/DIAG IV INF INIT: CPT

## 2025-03-07 PROCEDURE — 83735 ASSAY OF MAGNESIUM: CPT | Performed by: INTERNAL MEDICINE

## 2025-03-07 PROCEDURE — 85025 COMPLETE CBC W/AUTO DIFF WBC: CPT | Performed by: INTERNAL MEDICINE

## 2025-03-07 RX ADMIN — MAGNESIUM SULFATE HEPTAHYDRATE: 500 INJECTION, SOLUTION INTRAMUSCULAR; INTRAVENOUS at 13:50

## 2025-03-07 RX ADMIN — PEGFILGRASTIM 6 MG: 6 INJECTION SUBCUTANEOUS at 14:54

## 2025-03-07 NOTE — PROGRESS NOTES
Maira Moura  tolerated treatment well with no complications.      Maira Moura is aware of future appt on Monday Mar 10, 2025 11:00 AM.     AVS declined by Maira Moura.

## 2025-03-10 ENCOUNTER — PATIENT OUTREACH (OUTPATIENT)
Dept: HEMATOLOGY ONCOLOGY | Facility: CLINIC | Age: 70
End: 2025-03-10

## 2025-03-10 ENCOUNTER — HOSPITAL ENCOUNTER (OUTPATIENT)
Dept: INFUSION CENTER | Facility: CLINIC | Age: 70
Discharge: HOME | End: 2025-03-10
Payer: MEDICARE

## 2025-03-10 VITALS
DIASTOLIC BLOOD PRESSURE: 84 MMHG | HEART RATE: 94 BPM | TEMPERATURE: 98.4 F | RESPIRATION RATE: 18 BRPM | SYSTOLIC BLOOD PRESSURE: 124 MMHG

## 2025-03-10 DIAGNOSIS — Z17.0 MALIGNANT NEOPLASM OF UPPER-OUTER QUADRANT OF RIGHT BREAST IN FEMALE, ESTROGEN RECEPTOR POSITIVE (HCC): Primary | ICD-10-CM

## 2025-03-10 DIAGNOSIS — D53.9 NUTRITIONAL ANEMIA: ICD-10-CM

## 2025-03-10 DIAGNOSIS — C50.411 MALIGNANT NEOPLASM OF UPPER-OUTER QUADRANT OF RIGHT BREAST IN FEMALE, ESTROGEN RECEPTOR POSITIVE (HCC): Primary | ICD-10-CM

## 2025-03-10 LAB
ALBUMIN SERPL BCG-MCNC: 3.3 G/DL (ref 3.5–5)
ALP SERPL-CCNC: 131 U/L (ref 34–104)
ALT SERPL W P-5'-P-CCNC: 11 U/L (ref 7–52)
ANION GAP SERPL CALCULATED.3IONS-SCNC: 7 MMOL/L (ref 4–13)
AST SERPL W P-5'-P-CCNC: 29 U/L (ref 13–39)
BILIRUB SERPL-MCNC: 0.35 MG/DL (ref 0.2–1)
BUN SERPL-MCNC: 20 MG/DL (ref 5–25)
CALCIUM ALBUM COR SERPL-MCNC: 9.8 MG/DL (ref 8.3–10.1)
CALCIUM SERPL-MCNC: 9.2 MG/DL (ref 8.4–10.2)
CHLORIDE SERPL-SCNC: 96 MMOL/L (ref 96–108)
CO2 SERPL-SCNC: 28 MMOL/L (ref 21–32)
CREAT SERPL-MCNC: 1.02 MG/DL (ref 0.6–1.3)
ERYTHROCYTE [DISTWIDTH] IN BLOOD BY AUTOMATED COUNT: 15.4 % (ref 11.6–15.1)
GFR SERPL CREATININE-BSD FRML MDRD: 56 ML/MIN/1.73SQ M
GLUCOSE SERPL-MCNC: 94 MG/DL (ref 65–140)
HCT VFR BLD AUTO: 27.9 % (ref 34.8–46.1)
HGB BLD-MCNC: 8.8 G/DL (ref 11.5–15.4)
MAGNESIUM SERPL-MCNC: 1.7 MG/DL (ref 1.9–2.7)
MCH RBC QN AUTO: 29.7 PG (ref 26.8–34.3)
MCHC RBC AUTO-ENTMCNC: 31.5 G/DL (ref 31.4–37.4)
MCV RBC AUTO: 94 FL (ref 82–98)
PLATELET # BLD AUTO: 276 THOUSANDS/UL (ref 149–390)
PMV BLD AUTO: 10 FL (ref 8.9–12.7)
POTASSIUM SERPL-SCNC: 4 MMOL/L (ref 3.5–5.3)
PROT SERPL-MCNC: 7.3 G/DL (ref 6.4–8.4)
RBC # BLD AUTO: 2.96 MILLION/UL (ref 3.81–5.12)
SODIUM SERPL-SCNC: 131 MMOL/L (ref 135–147)
VIT B12 SERPL-MCNC: 452 PG/ML (ref 180–914)
WBC # BLD AUTO: 33.05 THOUSAND/UL (ref 4.31–10.16)

## 2025-03-10 PROCEDURE — 83735 ASSAY OF MAGNESIUM: CPT | Performed by: INTERNAL MEDICINE

## 2025-03-10 PROCEDURE — 82607 VITAMIN B-12: CPT

## 2025-03-10 PROCEDURE — 85007 BL SMEAR W/DIFF WBC COUNT: CPT | Performed by: INTERNAL MEDICINE

## 2025-03-10 PROCEDURE — 85027 COMPLETE CBC AUTOMATED: CPT | Performed by: INTERNAL MEDICINE

## 2025-03-10 PROCEDURE — 80053 COMPREHEN METABOLIC PANEL: CPT | Performed by: INTERNAL MEDICINE

## 2025-03-10 PROCEDURE — 96360 HYDRATION IV INFUSION INIT: CPT

## 2025-03-10 RX ADMIN — SODIUM CHLORIDE 1000 ML: 0.9 INJECTION, SOLUTION INTRAVENOUS at 11:45

## 2025-03-10 NOTE — PROGRESS NOTES
Pt called to confirm she is scheduled for transportation today. I confirmed via round trip, she is scheduled for transportation for her appointment today.   Pt called stating her ride was not there, I looked in round trip and confirmed with her that the  is coming in a grey equinox. She stated the  was there and she was in the car

## 2025-03-10 NOTE — PLAN OF CARE
Problem: INFECTION - ADULT  Goal: Absence or prevention of progression during hospitalization  Description: INTERVENTIONS:  - Assess and monitor for signs and symptoms of infection  - Monitor lab/diagnostic results  - Monitor all insertion sites, i.e. indwelling lines, tubes, and drains  - Monitor endotracheal if appropriate and nasal secretions for changes in amount and color  - [place] appropriate cooling/warming therapies per order  - Administer medications as ordered  - Instruct and encourage patient and family to use good hand hygiene technique  - Identify and instruct in appropriate isolation precautions for identified infection/condition  Outcome: Progressing     
No

## 2025-03-11 ENCOUNTER — EVALUATION (OUTPATIENT)
Dept: SPEECH THERAPY | Facility: CLINIC | Age: 70
End: 2025-03-11
Payer: MEDICARE

## 2025-03-11 DIAGNOSIS — C09.9 RIGHT TONSILLAR SQUAMOUS CELL CARCINOMA (HCC): ICD-10-CM

## 2025-03-11 DIAGNOSIS — Z90.89 STATUS POST TONSILLECTOMY: ICD-10-CM

## 2025-03-11 DIAGNOSIS — R13.12 DYSPHAGIA, OROPHARYNGEAL PHASE: Primary | ICD-10-CM

## 2025-03-11 LAB
ANISOCYTOSIS BLD QL SMEAR: PRESENT
BASOPHILS # BLD MANUAL: 0 THOUSAND/UL (ref 0–0.1)
BASOPHILS NFR MAR MANUAL: 0 % (ref 0–1)
EOSINOPHIL # BLD MANUAL: 0 THOUSAND/UL (ref 0–0.4)
EOSINOPHIL NFR BLD MANUAL: 0 % (ref 0–6)
LYMPHOCYTES # BLD AUTO: 1.32 THOUSAND/UL (ref 0.6–4.47)
LYMPHOCYTES # BLD AUTO: 4 % (ref 14–44)
MONOCYTES # BLD AUTO: 1.98 THOUSAND/UL (ref 0–1.22)
MONOCYTES NFR BLD: 6 % (ref 4–12)
NEUTROPHILS # BLD MANUAL: 29.74 THOUSAND/UL (ref 1.85–7.62)
NEUTS SEG NFR BLD AUTO: 90 % (ref 43–75)
PLATELET BLD QL SMEAR: ADEQUATE
RBC MORPH BLD: PRESENT

## 2025-03-11 PROCEDURE — 92526 ORAL FUNCTION THERAPY: CPT

## 2025-03-11 NOTE — PROGRESS NOTES
Speech-Language Pathology Re-Evaluation    Today's date: 3/11/2025   Patient’s name: Maira Moura  : 1955  MRN: [id number]  Safety measures: HTN, CVA, GERD, active CA, s/p PEG tube placement, aspiration precautions  Current recommended diet: PEG tube for primary nutrition/hydration; puree with thin liquids P.O. as tolerated   Referring provider: Harika Medina DO Encounter Diagnosis     ICD-10-CM    1. Dysphagia, oropharyngeal phase  R13.12       2. Status post tonsillectomy  Z90.89       3. 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 Per most recent VBSS (25); patient presented with incomplete epiglottic inversion. Patient has been mostly limited by odynophagia during treatment, as well as xerostomia, dysgeusia, and reduced appetite. Patient presented today with 0/10 pain; and was able to tolerate both puree and soft solids. She presented with mild-moderate globus sensation after bites of peaches; cleared with multiple swallows and liquid/puree wash. Patient using PEG for primary nutrition and weight remains stable.     Patient would continue to benefit from outpatient skilled Speech Therapy services for further education/training on safe swallowing strategies & aspiration precautions, for continued assessment of patient's tolerance of the safest, least restrictive diet, for therapeutic exercises, to facilitate overall improved quality of life, and for instruction on HEP.     -Safety concerns: Risk for aspiration, Risk for choking, and Risk for inadequate nutrition/ hydration    -Risk factors: History of Head and Neck Cancer, Complex medical history, and GERD      SWALLOWING SAFETY PRECAUTIONS:   PEG tube for primary nutrition/hydration    -Recommended solids: Puree    -Recommended liquids: Thin    -Recommended medication form: Crushed with puree or or via PEG (*consult with  physician to discuss if medication form can be altered)    -Frequent/thorough oral care    -Aspiration precautions  *Monitor for signs/symptoms concerning for aspiration (e.g., low grade fever, increase in WBC, change in chest x-ray, increased congestion, increased coughing with P.O. intake)    -Reflux precautions    -Strategies: Small sips and bites when eating, Slow rate, swallow between bites, and Alternate liquids and solids    -Positioning: Upright position during meals and Upright position at least 30 minutes after P.O. intake    -Compensatory strategies: Effortful swallow and Multiple swallows    -Supervision: Independent     -Referrals: none at this time      Short-term goals:  Patient will receive a videofluoroscopic swallow study (VFSS) to assess her current swallowing function to r/o penetration or aspiration, to determine the safest, least restrictive diet, and to recommended the appropriate rehabilitation exercises (to be achieved in 2-3 weeks). --patient had VBSS 1/13/25 - continue POC at this time. No updated VBSS recommended at this time.     Patient will be educated on safe swallowing compensatory strategies (e.g., upright posture, small bites/sips, slow rate, alternation of consistencies, multiple swallows, effortful swallow, etc.) to facilitate increased safety with P.O. intake (to be achieved in 4-6 weeks). --  MET    Patient will tolerate P.O. trials of her recommended diet with the implementation of safe swallowing strategies without overt s/sx of penetration and/or aspiration during skilled therapy sessions in this certification period (to be achieved in 4-6 weeks). -- PARTIALLY MET    Patient will perform oral motor exercises using IOPI device on lingual muscles to facilitate increased function and strength by 25% for improved swallowing function (to be achieved in 6-8 weeks). -- PARTIALLY MET    Patient will independently complete pharyngeal strengthening exercises (e.g., Effortful swallow,  "Mendelsohn, Masako) daily to facilitate increased pharyngeal strength and coordination (to be achieved in 4-6 weeks). -- PARTIALLY MET    Long-term goals:  Patient will maintain adequate nutrition and hydration with optimum safety and efficacy of swallowing function on P.O. intake without overt s/sx of penetration or aspiration (to be achieved by discharge). -- PARTIALLY MET    Patient will independently utilize compensatory strategies with optimum safety and efficacy of swallowing function on P.O. intake without overt s/sx of penetration or aspiration (to be achieved by discharge). -- PARTIALLY MET      Plan:   Patient would benefit from outpatient skilled Speech Therapy services: Dysphagia therapy (Due to patient's history of dysphagia, she may benefit from neuromuscular electrical stimulation (NMES) (i.e., VitalStim) treatment in conjunction with pharyngeal/laryngeal strengthening exercises and P.O. intake, unless otherwise contraindicated.)    Frequency: 2x weekly  Duration: 3 months    Intervention certification from: 3/11/2025  Intervention certification to: 06/11/2025      Subjective:  Patient reports \"feeling a little better\" overall  Mouth not as sore  0/10 pain right now  Can get to a 7/10 when \"I don't take my medicine on time  Messaged primary SLP/Anushka about [facility] getting her scheduled    Patient's goal(s): \"to learn how to swallow again\"    Pain: none at this time.      Objective:    Dysphagia UPDATE    Functional Outcome Measurements    -Functional Oral Intake Scale (FOIS): 2 - tube dependent with minimal/inconsistent oral intake      -Eating Assessment Tool (EAT-10) is a self-administered, symptom-specific outcome instrument for dysphagia. It consists of ten statements that a patient rates on a scale of 0-4, with 0=no problem to 4=severe problem. A score of 3 or more is abnormal.   Patient obtained a score of 32/40.   (IE: 39) -- improved      HEAD AND NECK CANCER CHECKLIST:  *Patient indicated that " "she is experiencing difficulties in the following areas:    SWALLOWING: Feeling of foods/liquids getting \"stuck\" in the throat, Needing to swallow many times to clear food from the mouth and/or throat, Coughing/choking when swallowing, Throat clearing after swallowing, Gurgly, \"wet-sounding\" voice after swallowing, Pain with chewing and/or swallowing, Dry mouth, and Altered taste sensation     SPEECH: n/a    VOICE: n/a      -Difficulty swallowing: Solids, Liquids, and Pills     -Current diet (solids): limited P.O. intake (e.g., mashed potatoes, soup--e.g., chicken rice, yogurt) - a cup pudding daily  -Current diet (liquids): Thin (sips of water, jello, has tried Ensure) - 1 full ensure daily and sips of water   -Current pill intake method: Takes small pills whole with water, takes larger pills crushed in puree  -Alternative Feeding Method?: PEG tube (taking 2 cans of TF formula/day and 2 ensure via PEG)      Treatment:  Traditional VitalStim     Channel(s) used: 1, 2   Placement: 3a   Duty Cycle: 57/1 s   Frequency: 80 pulses per second   Phase Duration: 300 microseconds   Treatment Duration: 40 minutes   Min mA level: 3.0 mA   Max mA level: 5.0 mA      Patient tolerated NMES in conjunction with pharyngeal/laryngeal strengthening exercises and P.O. intake. (The patient received instruction on the potential benefits from NMES treatment, including neuromuscular re-education and muscle strengthening.) Skin condition post-NMES: intact.      Patient consumed the following during today’s session: cut up peaches, pears and pineapple (no juice); and water (thin liquid) via side of cup -- NMES remained on during pharyngeal strengthening exercises (see below).      Reporting fruit \"gets stuck\" - increased coughing noted  Patient provided with puree (chocolate pudding) and attempted to use to clear any residue.     Attempted to alternate peaches and pudding to assist with clearance of residue in valleculae. Patient reports " mild success with this strategy.     Patient without any c/o odynophagia. Patient indicated that she remembered to take her pain medication today.    Patient appeared to have adequate anterior closure, A to P transfer, bolus formation/control, and swallow initiation.  No other overt s/sx of penetration/aspiration noted during today's session. Patient implemented small bites and slow rate with P.O. intake, as well as liquid washes as needed.       Visit Tracking:  POC   Expires Auth Expiration Date ST Visit Limit   06/11/25 or 10th visit 12/31/2025 BOMN              Visit/Unit Tracking:  Auth Status Date 01/06/25 01/09/25 01/14/25 01/21/25 01/23/25 02/06/25 02/13/25 02/20/25 3/4/25 03/11/25  PU   Not required Used 1 2 3 4 5 6 7 8 9 10     Remaining 9 8 7 6 5 4 3 2 1 0

## 2025-03-12 ENCOUNTER — HOSPITAL ENCOUNTER (OUTPATIENT)
Dept: INFUSION CENTER | Facility: CLINIC | Age: 70
Discharge: HOME | End: 2025-03-12
Payer: MEDICARE

## 2025-03-12 VITALS
RESPIRATION RATE: 18 BRPM | DIASTOLIC BLOOD PRESSURE: 88 MMHG | HEART RATE: 103 BPM | SYSTOLIC BLOOD PRESSURE: 131 MMHG | TEMPERATURE: 97 F

## 2025-03-12 DIAGNOSIS — C50.411 MALIGNANT NEOPLASM OF UPPER-OUTER QUADRANT OF RIGHT BREAST IN FEMALE, ESTROGEN RECEPTOR POSITIVE (HCC): Primary | ICD-10-CM

## 2025-03-12 DIAGNOSIS — Z17.0 MALIGNANT NEOPLASM OF UPPER-OUTER QUADRANT OF RIGHT BREAST IN FEMALE, ESTROGEN RECEPTOR POSITIVE (HCC): Primary | ICD-10-CM

## 2025-03-12 LAB
ALBUMIN SERPL BCG-MCNC: 3.2 G/DL (ref 3.5–5)
ALP SERPL-CCNC: 148 U/L (ref 34–104)
ALT SERPL W P-5'-P-CCNC: 9 U/L (ref 7–52)
ANION GAP SERPL CALCULATED.3IONS-SCNC: 7 MMOL/L (ref 4–13)
AST SERPL W P-5'-P-CCNC: 24 U/L (ref 13–39)
BASOPHILS # BLD AUTO: 0.04 THOUSANDS/ÂΜL (ref 0–0.1)
BASOPHILS NFR BLD AUTO: 0 % (ref 0–1)
BILIRUB SERPL-MCNC: 0.26 MG/DL (ref 0.2–1)
BUN SERPL-MCNC: 19 MG/DL (ref 5–25)
CALCIUM ALBUM COR SERPL-MCNC: 9.8 MG/DL (ref 8.3–10.1)
CALCIUM SERPL-MCNC: 9.2 MG/DL (ref 8.4–10.2)
CHLORIDE SERPL-SCNC: 97 MMOL/L (ref 96–108)
CO2 SERPL-SCNC: 29 MMOL/L (ref 21–32)
CREAT SERPL-MCNC: 1.02 MG/DL (ref 0.6–1.3)
EOSINOPHIL # BLD AUTO: 0.26 THOUSAND/ÂΜL (ref 0–0.61)
EOSINOPHIL NFR BLD AUTO: 2 % (ref 0–6)
ERYTHROCYTE [DISTWIDTH] IN BLOOD BY AUTOMATED COUNT: 15.7 % (ref 11.6–15.1)
GFR SERPL CREATININE-BSD FRML MDRD: 56 ML/MIN/1.73SQ M
GLUCOSE SERPL-MCNC: 103 MG/DL (ref 65–140)
HCT VFR BLD AUTO: 28.1 % (ref 34.8–46.1)
HGB BLD-MCNC: 8.9 G/DL (ref 11.5–15.4)
IMM GRANULOCYTES # BLD AUTO: 0.17 THOUSAND/UL (ref 0–0.2)
IMM GRANULOCYTES NFR BLD AUTO: 1 % (ref 0–2)
LYMPHOCYTES # BLD AUTO: 0.97 THOUSANDS/ÂΜL (ref 0.6–4.47)
LYMPHOCYTES NFR BLD AUTO: 6 % (ref 14–44)
MAGNESIUM SERPL-MCNC: 1.6 MG/DL (ref 1.9–2.7)
MCH RBC QN AUTO: 30.2 PG (ref 26.8–34.3)
MCHC RBC AUTO-ENTMCNC: 31.7 G/DL (ref 31.4–37.4)
MCV RBC AUTO: 95 FL (ref 82–98)
MONOCYTES # BLD AUTO: 1.33 THOUSAND/ÂΜL (ref 0.17–1.22)
MONOCYTES NFR BLD AUTO: 8 % (ref 4–12)
NEUTROPHILS # BLD AUTO: 13.56 THOUSANDS/ÂΜL (ref 1.85–7.62)
NEUTS SEG NFR BLD AUTO: 83 % (ref 43–75)
NRBC BLD AUTO-RTO: 0 /100 WBCS
PLATELET # BLD AUTO: 271 THOUSANDS/UL (ref 149–390)
PMV BLD AUTO: 10.2 FL (ref 8.9–12.7)
POTASSIUM SERPL-SCNC: 3.9 MMOL/L (ref 3.5–5.3)
PROT SERPL-MCNC: 7.4 G/DL (ref 6.4–8.4)
RBC # BLD AUTO: 2.95 MILLION/UL (ref 3.81–5.12)
SODIUM SERPL-SCNC: 133 MMOL/L (ref 135–147)
WBC # BLD AUTO: 16.33 THOUSAND/UL (ref 4.31–10.16)

## 2025-03-12 PROCEDURE — 80053 COMPREHEN METABOLIC PANEL: CPT | Performed by: INTERNAL MEDICINE

## 2025-03-12 PROCEDURE — 83735 ASSAY OF MAGNESIUM: CPT | Performed by: INTERNAL MEDICINE

## 2025-03-12 PROCEDURE — 96365 THER/PROPH/DIAG IV INF INIT: CPT

## 2025-03-12 PROCEDURE — 85025 COMPLETE CBC W/AUTO DIFF WBC: CPT | Performed by: INTERNAL MEDICINE

## 2025-03-12 RX ADMIN — MAGNESIUM SULFATE HEPTAHYDRATE: 500 INJECTION, SOLUTION INTRAMUSCULAR; INTRAVENOUS at 12:07

## 2025-03-12 NOTE — PROGRESS NOTES
Labs collected via port. Last Mag level 1.7. Pt tolerated IV hydration with magnesium without incident.Pt declined AVS, aware of next appt 3/14 at 12pm.

## 2025-03-13 ENCOUNTER — APPOINTMENT (OUTPATIENT)
Dept: SPEECH THERAPY | Facility: CLINIC | Age: 70
End: 2025-03-13
Payer: MEDICARE

## 2025-03-13 ENCOUNTER — PATIENT OUTREACH (OUTPATIENT)
Dept: HEMATOLOGY ONCOLOGY | Facility: CLINIC | Age: 70
End: 2025-03-13

## 2025-03-13 DIAGNOSIS — Z90.89 STATUS POST TONSILLECTOMY: ICD-10-CM

## 2025-03-13 DIAGNOSIS — C09.9 RIGHT TONSILLAR SQUAMOUS CELL CARCINOMA (HCC): ICD-10-CM

## 2025-03-13 DIAGNOSIS — R13.12 DYSPHAGIA, OROPHARYNGEAL PHASE: Primary | ICD-10-CM

## 2025-03-13 NOTE — PROGRESS NOTES
Pt called asking if she was on the schedule for transportation for her speech appointment today. I explained she was not, and needed to contact the speech therapy office, as they are responsible to set up transportation. I confirmed the speech therapy office contact information with her

## 2025-03-13 NOTE — PROGRESS NOTES
Daily Speech Treatment Note    Today's date: 3/20/2025  Patient’s name: Maira Moura  : 1955  MRN: [id number]  Safety measures: HTN, CVA, GERD, active CA, s/p PEG tube placement, aspiration precautions  Current recommended diet: PEG tube for primary nutrition/hydration; puree with thin liquids P.O. as tolerated   Referring provider: Harika Medina DO    Encounter Diagnosis     ICD-10-CM    1. Dysphagia, oropharyngeal phase  R13.12       2. Status post tonsillectomy  Z90.89       3. 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 Clinician and patient left a message for Fani Rivas (patient's nurse navigator) to see if she could assist us with setting-up patient's rides to [facility] as she is being transported to her other appointments. Fani contacted us back during the session and provided us with guidance.    -Patient reported that she will bring along egg salad to her next appointment.    -Patient stated that she tried macaroni and cheese last evening; she stopped P.O. intake after experiencing globus sensation. Patient wants to try Spaghetti-Os tonight.    Objective/Assessment:  -Reviewed patient's home exercises/activities completed since last appointment.      Short-term goals:  Patient will tolerate P.O. trials of her recommended diet with the implementation of safe swallowing strategies without overt s/sx of penetration and/or aspiration during skilled therapy sessions in this certification period (to be achieved in 4-6 weeks). -- PARTIALLY MET    Traditional VitalStim     Channel(s) used: 1, 2   Placement:  3a   Duty Cycle: 57/1 s   Frequency: 80 pulses per second   Phase Duration: 300 microseconds   Treatment Duration: 33 minutes   Min mA level: 8.5 mA   Max mA level: 12.0 mA      Patient tolerated NMES in conjunction with pharyngeal/laryngeal strengthening exercises and P.O. intake. (The patient received instruction on the potential benefits from NMES treatment, including neuromuscular re-education and muscle strengthening.) Skin condition post-NMES: intact.      Patient consumed the following during today’s session: mandarin oranges in thin liquid juice and water (thin liquid) via side of cup -- NMES remained on during IOPI exercises (see below).       Cough x 1 with oranges  Cough x 2 with water      Patient without any c/o odynophagia again today. Oral containment and bolus formation/control, AP transfer, and swallow initiation were judged to be WFL. Reduced hyolaryngeal elevation and excursion noted upon palpation. No oral cavity residue was observed. Patient did not report any c/o globus sensation. Cough x3 noted during session trials. Vocal quality returned to baseline status. No other overt s/sx of penetration/aspiration noted during today's session.     Patient will perform oral motor exercises using IOPI device on lingual muscles to facilitate increased function and strength by 25% for improved swallowing function (to be achieved in 6-8 weeks). -- PARTIALLY MET    Patient with significantly improved IOPI measures gathered during today's session. Clinician had patient practice exercises during session to assess performance for carryover with HEP. Patient was noted to fatigue on her 4th repetition of exercises.    nuMVCI Pro Pmax Data Tracker Grid (from [id number]-uq-[id number]):       01/09/25 01/14/25 01/23/25 03/20/25        Tongue tip (GOAL = 56) 50 49 49 70        Tongue back (GOAL = 50) 47 54 61 69             IOPI Pro -- Today's Exercise Targets:       Pmax (after 3 trials): Effort level: Target for today's  treatment:   Tongue tip  (GOAL = 56) 70 80%    55   Tongue back  (GOAL = 50) 69 80%    55         IOPI Pro -- Exercise Tracker Grid (from [id number]):       01/09/25 01/14/25 01/23/25 03/20/25        Tongue tip 3 sets of 30 reps (pumps)    3 sets of 30 reps (pumps)     Endurance holds (50% of max) x 2; avg 20    3 sets of 30 reps (pumps)     1 sets of 3 reps (20.47 second holds) 4 sets of 30 reps (pumps)       Tongue back 3 sets of 30 reps (pumps)    3 sets of 30 reps (pumps)     Endurance holds (50% of max) x 2; avg 23 sec    3 sets of 30 reps (pumps)     1 sets of 3 reps (16.92 second holds) 4 sets of 30 reps (pumps)          Patient will independently complete pharyngeal strengthening exercises (e.g., Effortful swallow, Mendelsohn, Evelia) daily to facilitate increased pharyngeal strength and coordination (to be achieved in 4-6 weeks). -- PARTIALLY MET    Plan:  -Patient was provided with home exercises/activities to target goals in plan of care at the end of today's session.  -Continue with current plan of care.

## 2025-03-14 ENCOUNTER — HOSPITAL ENCOUNTER (OUTPATIENT)
Dept: INFUSION CENTER | Facility: CLINIC | Age: 70
End: 2025-03-14
Payer: MEDICARE

## 2025-03-14 VITALS
SYSTOLIC BLOOD PRESSURE: 115 MMHG | DIASTOLIC BLOOD PRESSURE: 79 MMHG | RESPIRATION RATE: 16 BRPM | TEMPERATURE: 96.6 F | HEART RATE: 76 BPM

## 2025-03-14 DIAGNOSIS — Z17.0 MALIGNANT NEOPLASM OF UPPER-OUTER QUADRANT OF RIGHT BREAST IN FEMALE, ESTROGEN RECEPTOR POSITIVE (HCC): Primary | ICD-10-CM

## 2025-03-14 DIAGNOSIS — C50.411 MALIGNANT NEOPLASM OF UPPER-OUTER QUADRANT OF RIGHT BREAST IN FEMALE, ESTROGEN RECEPTOR POSITIVE (HCC): Primary | ICD-10-CM

## 2025-03-14 LAB
ALBUMIN SERPL BCG-MCNC: 3.3 G/DL (ref 3.5–5)
ALP SERPL-CCNC: 153 U/L (ref 34–104)
ALT SERPL W P-5'-P-CCNC: 9 U/L (ref 7–52)
ANION GAP SERPL CALCULATED.3IONS-SCNC: 7 MMOL/L (ref 4–13)
AST SERPL W P-5'-P-CCNC: 21 U/L (ref 13–39)
BASOPHILS # BLD AUTO: 0.17 THOUSANDS/ÂΜL (ref 0–0.1)
BASOPHILS NFR BLD AUTO: 1 % (ref 0–1)
BILIRUB SERPL-MCNC: 0.26 MG/DL (ref 0.2–1)
BUN SERPL-MCNC: 19 MG/DL (ref 5–25)
CALCIUM ALBUM COR SERPL-MCNC: 9.8 MG/DL (ref 8.3–10.1)
CALCIUM SERPL-MCNC: 9.2 MG/DL (ref 8.4–10.2)
CHLORIDE SERPL-SCNC: 98 MMOL/L (ref 96–108)
CO2 SERPL-SCNC: 29 MMOL/L (ref 21–32)
CREAT SERPL-MCNC: 1 MG/DL (ref 0.6–1.3)
EOSINOPHIL # BLD AUTO: 0.1 THOUSAND/ÂΜL (ref 0–0.61)
EOSINOPHIL NFR BLD AUTO: 1 % (ref 0–6)
ERYTHROCYTE [DISTWIDTH] IN BLOOD BY AUTOMATED COUNT: 15.9 % (ref 11.6–15.1)
GFR SERPL CREATININE-BSD FRML MDRD: 57 ML/MIN/1.73SQ M
GLUCOSE SERPL-MCNC: 98 MG/DL (ref 65–140)
HCT VFR BLD AUTO: 28.3 % (ref 34.8–46.1)
HGB BLD-MCNC: 9.1 G/DL (ref 11.5–15.4)
IMM GRANULOCYTES # BLD AUTO: 0.26 THOUSAND/UL (ref 0–0.2)
IMM GRANULOCYTES NFR BLD AUTO: 1 % (ref 0–2)
LYMPHOCYTES # BLD AUTO: 0.85 THOUSANDS/ÂΜL (ref 0.6–4.47)
LYMPHOCYTES NFR BLD AUTO: 5 % (ref 14–44)
MAGNESIUM SERPL-MCNC: 1.9 MG/DL (ref 1.9–2.7)
MCH RBC QN AUTO: 29.9 PG (ref 26.8–34.3)
MCHC RBC AUTO-ENTMCNC: 32.2 G/DL (ref 31.4–37.4)
MCV RBC AUTO: 93 FL (ref 82–98)
MONOCYTES # BLD AUTO: 1.67 THOUSAND/ÂΜL (ref 0.17–1.22)
MONOCYTES NFR BLD AUTO: 9 % (ref 4–12)
NEUTROPHILS # BLD AUTO: 15.65 THOUSANDS/ÂΜL (ref 1.85–7.62)
NEUTS SEG NFR BLD AUTO: 83 % (ref 43–75)
NRBC BLD AUTO-RTO: 0 /100 WBCS
PLATELET # BLD AUTO: 284 THOUSANDS/UL (ref 149–390)
PMV BLD AUTO: 10.3 FL (ref 8.9–12.7)
POTASSIUM SERPL-SCNC: 4.1 MMOL/L (ref 3.5–5.3)
PROT SERPL-MCNC: 7.5 G/DL (ref 6.4–8.4)
RBC # BLD AUTO: 3.04 MILLION/UL (ref 3.81–5.12)
SODIUM SERPL-SCNC: 134 MMOL/L (ref 135–147)
WBC # BLD AUTO: 18.7 THOUSAND/UL (ref 4.31–10.16)

## 2025-03-14 PROCEDURE — 85025 COMPLETE CBC W/AUTO DIFF WBC: CPT | Performed by: INTERNAL MEDICINE

## 2025-03-14 PROCEDURE — 80053 COMPREHEN METABOLIC PANEL: CPT | Performed by: INTERNAL MEDICINE

## 2025-03-14 PROCEDURE — 96365 THER/PROPH/DIAG IV INF INIT: CPT

## 2025-03-14 PROCEDURE — 83735 ASSAY OF MAGNESIUM: CPT | Performed by: INTERNAL MEDICINE

## 2025-03-14 RX ORDER — ATROPINE SULFATE 1 MG/ML
0.25 INJECTION, SOLUTION INTRAVENOUS ONCE
Status: CANCELLED | OUTPATIENT
Start: 2025-03-19

## 2025-03-14 RX ORDER — ATROPINE SULFATE 1 MG/ML
0.25 INJECTION, SOLUTION INTRAVENOUS ONCE AS NEEDED
Status: CANCELLED | OUTPATIENT
Start: 2025-03-19

## 2025-03-14 RX ORDER — SODIUM CHLORIDE 9 MG/ML
20 INJECTION, SOLUTION INTRAVENOUS ONCE
Status: CANCELLED | OUTPATIENT
Start: 2025-03-19

## 2025-03-14 RX ADMIN — MAGNESIUM SULFATE HEPTAHYDRATE: 500 INJECTION, SOLUTION INTRAMUSCULAR; INTRAVENOUS at 12:25

## 2025-03-14 NOTE — PROGRESS NOTES
Maira Moura  tolerated IV hydration/magnesium well with no complications.      Maira Moura is aware of future appt on 3/17 at 11am.     AVS Declined    Left unit in stable condition.

## 2025-03-14 NOTE — PLAN OF CARE
Problem: METABOLIC, FLUID AND ELECTROLYTES - ADULT  Goal: Electrolytes maintained within normal limits  Description: INTERVENTIONS:  - Monitor labs and assess patient for signs and symptoms of electrolyte imbalances  - Administer electrolyte replacement as ordered  - Monitor response to electrolyte replacements, including repeat lab results as appropriate  - Instruct patient on fluid and nutrition as appropriate  Outcome: Progressing  Goal: Fluid balance maintained  Description: INTERVENTIONS:  - Monitor labs   - Instruct patient on fluid and nutrition as appropriate  - Assess for signs & symptoms of volume excess or deficit  - administer IV hydration as ordered  Outcome: Progressing

## 2025-03-17 ENCOUNTER — HOSPITAL ENCOUNTER (OUTPATIENT)
Dept: INFUSION CENTER | Facility: CLINIC | Age: 70
Discharge: HOME | End: 2025-03-17
Payer: MEDICARE

## 2025-03-17 VITALS
TEMPERATURE: 97.5 F | HEART RATE: 91 BPM | SYSTOLIC BLOOD PRESSURE: 116 MMHG | RESPIRATION RATE: 18 BRPM | DIASTOLIC BLOOD PRESSURE: 81 MMHG

## 2025-03-17 DIAGNOSIS — Z17.0 MALIGNANT NEOPLASM OF UPPER-OUTER QUADRANT OF RIGHT BREAST IN FEMALE, ESTROGEN RECEPTOR POSITIVE (HCC): Primary | ICD-10-CM

## 2025-03-17 DIAGNOSIS — C18.9 METASTATIC COLON CANCER TO LIVER (HCC): Primary | ICD-10-CM

## 2025-03-17 DIAGNOSIS — C50.411 MALIGNANT NEOPLASM OF UPPER-OUTER QUADRANT OF RIGHT BREAST IN FEMALE, ESTROGEN RECEPTOR POSITIVE (HCC): Primary | ICD-10-CM

## 2025-03-17 DIAGNOSIS — C78.7 METASTATIC COLON CANCER TO LIVER (HCC): Primary | ICD-10-CM

## 2025-03-17 LAB
ALBUMIN SERPL BCG-MCNC: 3.3 G/DL (ref 3.5–5)
ALP SERPL-CCNC: 131 U/L (ref 34–104)
ALT SERPL W P-5'-P-CCNC: 8 U/L (ref 7–52)
ANION GAP SERPL CALCULATED.3IONS-SCNC: 6 MMOL/L (ref 4–13)
AST SERPL W P-5'-P-CCNC: 22 U/L (ref 13–39)
BASOPHILS # BLD AUTO: 0.07 THOUSANDS/ÂΜL (ref 0–0.1)
BASOPHILS NFR BLD AUTO: 1 % (ref 0–1)
BILIRUB SERPL-MCNC: 0.3 MG/DL (ref 0.2–1)
BUN SERPL-MCNC: 23 MG/DL (ref 5–25)
CALCIUM ALBUM COR SERPL-MCNC: 9.8 MG/DL (ref 8.3–10.1)
CALCIUM SERPL-MCNC: 9.2 MG/DL (ref 8.4–10.2)
CHLORIDE SERPL-SCNC: 100 MMOL/L (ref 96–108)
CO2 SERPL-SCNC: 30 MMOL/L (ref 21–32)
CREAT SERPL-MCNC: 1.05 MG/DL (ref 0.6–1.3)
EOSINOPHIL # BLD AUTO: 0.09 THOUSAND/ÂΜL (ref 0–0.61)
EOSINOPHIL NFR BLD AUTO: 1 % (ref 0–6)
ERYTHROCYTE [DISTWIDTH] IN BLOOD BY AUTOMATED COUNT: 16 % (ref 11.6–15.1)
GFR SERPL CREATININE-BSD FRML MDRD: 54 ML/MIN/1.73SQ M
GLUCOSE SERPL-MCNC: 82 MG/DL (ref 65–140)
HCT VFR BLD AUTO: 29.2 % (ref 34.8–46.1)
HGB BLD-MCNC: 9.3 G/DL (ref 11.5–15.4)
IMM GRANULOCYTES # BLD AUTO: 0.05 THOUSAND/UL (ref 0–0.2)
IMM GRANULOCYTES NFR BLD AUTO: 1 % (ref 0–2)
LYMPHOCYTES # BLD AUTO: 0.79 THOUSANDS/ÂΜL (ref 0.6–4.47)
LYMPHOCYTES NFR BLD AUTO: 8 % (ref 14–44)
MAGNESIUM SERPL-MCNC: 2 MG/DL (ref 1.9–2.7)
MCH RBC QN AUTO: 29.8 PG (ref 26.8–34.3)
MCHC RBC AUTO-ENTMCNC: 31.8 G/DL (ref 31.4–37.4)
MCV RBC AUTO: 94 FL (ref 82–98)
MONOCYTES # BLD AUTO: 0.7 THOUSAND/ÂΜL (ref 0.17–1.22)
MONOCYTES NFR BLD AUTO: 7 % (ref 4–12)
NEUTROPHILS # BLD AUTO: 8.86 THOUSANDS/ÂΜL (ref 1.85–7.62)
NEUTS SEG NFR BLD AUTO: 82 % (ref 43–75)
NRBC BLD AUTO-RTO: 0 /100 WBCS
PLATELET # BLD AUTO: 282 THOUSANDS/UL (ref 149–390)
PMV BLD AUTO: 10.1 FL (ref 8.9–12.7)
POTASSIUM SERPL-SCNC: 4 MMOL/L (ref 3.5–5.3)
PROT SERPL-MCNC: 7.4 G/DL (ref 6.4–8.4)
RBC # BLD AUTO: 3.12 MILLION/UL (ref 3.81–5.12)
SODIUM SERPL-SCNC: 136 MMOL/L (ref 135–147)
WBC # BLD AUTO: 10.56 THOUSAND/UL (ref 4.31–10.16)

## 2025-03-17 PROCEDURE — 80053 COMPREHEN METABOLIC PANEL: CPT | Performed by: INTERNAL MEDICINE

## 2025-03-17 PROCEDURE — 83735 ASSAY OF MAGNESIUM: CPT | Performed by: INTERNAL MEDICINE

## 2025-03-17 PROCEDURE — 96365 THER/PROPH/DIAG IV INF INIT: CPT

## 2025-03-17 PROCEDURE — 85025 COMPLETE CBC W/AUTO DIFF WBC: CPT | Performed by: INTERNAL MEDICINE

## 2025-03-17 RX ADMIN — SODIUM CHLORIDE 1000 ML: 0.9 INJECTION, SOLUTION INTRAVENOUS at 11:20

## 2025-03-17 RX ADMIN — MAGNESIUM SULFATE HEPTAHYDRATE: 500 INJECTION, SOLUTION INTRAMUSCULAR; INTRAVENOUS at 11:28

## 2025-03-17 NOTE — PROGRESS NOTES
Daily Speech Treatment Note    Today's date: 3/27/2025  Patient’s name: Maira Moura  : 1955  MRN: [id number]  Safety measures: HTN, CVA, GERD, active CA, s/p PEG tube placement, aspiration precautions  Current recommended diet: PEG tube for primary nutrition/hydration; puree with thin liquids P.O. as tolerated   Referring provider: Harika Medina DO Encounter Diagnosis     ICD-10-CM    1. Dysphagia, oropharyngeal phase  R13.12       2. Status post tonsillectomy  Z90.89       3. 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     -Patient reported that she consumed an egg and cheese omelette last evening for dinner. Patient noted that it was easy for her to swallow.    Objective/Assessment:    Short-term goals:  Patient will tolerate P.O. trials of her recommended diet with the implementation of safe swallowing strategies without overt s/sx of penetration and/or aspiration during skilled therapy sessions in this certification period (to be achieved in 4-6 weeks). -- PARTIALLY MET    Traditional VitalStim     Channel(s) used: 1, 2   Placement: 3a   Duty Cycle: 57/1 s   Frequency: 80 pulses per second   Phase Duration: 300 microseconds   Treatment Duration: 36 minutes   Min mA level: 8.0 mA   Max mA level: 11.5 mA      Patient tolerated NMES in conjunction with pharyngeal/laryngeal strengthening exercises and P.O. intake. (The patient received instruction on the potential benefits from NMES treatment, including neuromuscular re-education and muscle strengthening.) Skin condition post-NMES: intact.      Patient consumed the following during  "today’s session: macaroni noodles with marinara sauce and cheese with water (thin liquid) via side of cup -- NMES remained on during pharyngeal strengthening and IOPI exercises (see below).     Patient without any c/o odynophagia again today. Oral containment and bolus formation/control, AP transfer, and swallow initiation were judged to be WFL.  Reduced hyolaryngeal elevation and excursion noted upon palpation. No oral cavity residue was observed. Patient reported globus sensation on macaroni noodles. Patient was cued to take smaller bites. On the larger forkful, patient expressed sensation was \"higher up\" as compared to the smaller forkful (globus sensation was \"lower\"). Throat clear x3 noted on thin liquid during session trials. Vocal quality remained at baseline status. No other overt s/sx of penetration/aspiration noted during today's session.     Patient noted that her appetite continues to be reduced, but she has been trying to add snacks P.O. between her TF (which are scheduled at 7am, 12pm, and 6pm).    Patient will perform oral motor exercises using IOPI device on lingual muscles to facilitate increased function and strength by 25% for improved swallowing function (to be achieved in 6-8 weeks). -- PARTIALLY MET    CopperKeyI Pro Pmax Data Tracker Grid (from [id number]):       01/09/25 01/14/25 01/23/25 03/20/25 03/25/25 03/27/25   Tongue tip (GOAL = 56) 50 49 49 70  58  61    Tongue back (GOAL = 50) 47 54 61 69  62 64       IOPI Pro -- Today's Exercise Targets:       Pmax (after 3 trials): Effort level: Target for today's treatment:   Tongue tip  (GOAL = 56) 61 80%    49   Tongue back  (GOAL = 50) 64 80%    51         IOPI Pro -- Exercise Tracker Grid (from [id number]):       01/09/25 01/14/25 01/23/25 03/20/25 03/25/25 03/27/25    Tongue tip 3 sets of 30 reps (pumps)    3 sets of 30 reps (pumps)     Endurance holds (50% of max) x 2; avg 20    3 sets of 30 reps (pumps)     1 sets of 3 reps " (20.47 second holds) 4 sets of 30 reps (pumps)  4 sets of 30 reps (pumps) 2 sets of 30 reps (pumps)   Tongue back 3 sets of 30 reps (pumps)    3 sets of 30 reps (pumps)     Endurance holds (50% of max) x 2; avg 23 sec    3 sets of 30 reps (pumps)     1 sets of 3 reps (16.92 second holds) 4 sets of 30 reps (pumps)  4 sets of 30 reps (pumps) 2 sets of 30 reps (pumps)       Patient will independently complete pharyngeal strengthening exercises (e.g., Effortful swallow, Mendelsohn, Evelia) daily to facilitate increased pharyngeal strength and coordination (to be achieved in 4-6 weeks). -- PARTIALLY MET  -Patient completed the following pharyngeal strengthening exercises during today's session:  *Effortful swallow: 1 set of 30 reps  *Mendelsohn: 1 set of 30 reps  *Evelia: 1 set of 30 reps    Plan:  -Patient was provided with home exercises/activities to target goals in plan of care at the end of today's session.  -Continue with current plan of care.

## 2025-03-17 NOTE — PROGRESS NOTES
Labs collected via port. Pt tolerated IV hydration with mag without incident. Pt declined AVS, aware of next appt 3/19 at 12pm.

## 2025-03-18 ENCOUNTER — OFFICE VISIT (OUTPATIENT)
Dept: SPEECH THERAPY | Facility: CLINIC | Age: 70
End: 2025-03-18
Payer: MEDICARE

## 2025-03-18 ENCOUNTER — OFFICE VISIT (OUTPATIENT)
Dept: HEMATOLOGY ONCOLOGY | Facility: CLINIC | Age: 70
End: 2025-03-18
Payer: MEDICARE

## 2025-03-18 VITALS
HEART RATE: 92 BPM | RESPIRATION RATE: 16 BRPM | TEMPERATURE: 97.6 F | HEIGHT: 66 IN | WEIGHT: 157 LBS | SYSTOLIC BLOOD PRESSURE: 114 MMHG | OXYGEN SATURATION: 99 % | BODY MASS INDEX: 25.23 KG/M2 | DIASTOLIC BLOOD PRESSURE: 70 MMHG

## 2025-03-18 DIAGNOSIS — C78.7 METASTATIC COLON CANCER TO LIVER (HCC): Primary | ICD-10-CM

## 2025-03-18 DIAGNOSIS — C09.9 RIGHT TONSILLAR SQUAMOUS CELL CARCINOMA (HCC): ICD-10-CM

## 2025-03-18 DIAGNOSIS — Z90.89 STATUS POST TONSILLECTOMY: ICD-10-CM

## 2025-03-18 DIAGNOSIS — C18.9 METASTATIC COLON CANCER TO LIVER (HCC): Primary | ICD-10-CM

## 2025-03-18 DIAGNOSIS — R13.12 DYSPHAGIA, OROPHARYNGEAL PHASE: Primary | ICD-10-CM

## 2025-03-18 PROCEDURE — 92526 ORAL FUNCTION THERAPY: CPT

## 2025-03-18 PROCEDURE — 99214 OFFICE O/P EST MOD 30 MIN: CPT | Performed by: INTERNAL MEDICINE

## 2025-03-18 PROCEDURE — G2211 COMPLEX E/M VISIT ADD ON: HCPCS | Performed by: INTERNAL MEDICINE

## 2025-03-18 NOTE — ASSESSMENT & PLAN NOTE
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 Unfortunately, patient now exhibits interval progression of disease involving the liver. She is also noted to have a new subcentimeter right lung nodule (Measuring 3-millimeters), and stable large right adnexal mass with low-level FDG-activity. Given the above-mentioned, patient was reinitiated on systemic-therapy with FOLFIRI with fifty-percent dose-reduction of Irinotecan due to existing diarrhea (Cycle 1 initiated on January 8th, 2025). Patient has since completed two-cycles of the above-mentioned (Last administered on January 22nd, 2025). Cycle 3 was deferred and, ultimately, cancelled, and growth-factor support was administered in the interim, in the setting of neutropenia. Re-staging CT Chest, Abdomen, and Pelvis was obtained and demonstrated interval progression of hepatic metastatic disease, as well as slight increase in size of a right lower lobe pulmonary nodule. Given the above-mentioned, patient was recommended to proceed to Cycle 4 of systemic-therapy with FOLFIRI with 50-percent dose-reduction of Irinotecan, and omission of 5-Fluorouracil bolus with Leucovorin on March 5th, 2025. She  now presents for interval follow-up.    On evaluation this morning, patient is clinically well. She tolerated Cycle 4 with dose-attenuation and above modifications, well; with minimal treatment-related toxicities. Repeat laboratory-studies demonstrate leukocytosis with neutrophilic-predominance in the setting of ongoing growth-factor support (WBC: 10.56), stable normocytic anemia (Hemoglobin: 9.3 MCV: 94), stable mild elevation of alkaline phosphatase (ALP: 131), and mild hypoalbuminemia (Albumin: 3.3). Magnesium: 2.0. Will proceed to Cycle 5 of systemic-therapy on March 19th, 2025. Recommend close interval follow-up in approximately 2-weeks (See Below), prior to initiation of Cycle 6. Patient expressed understanding and agreement with the above plan.     Summary of Recommendations:  Continue three-times -weekly intravenous-hydration (Monday, Wednesday, and Friday).  Proceed to Cycle 5 Day 1 of FOLFIRI every 14-days on March 19th, 2025:  A. Note: Continue with 50% dose-reduction of Irinotecan, and omission of 5-FU bolus.  Recommend close interval follow-up on April 1st, 2025 at 11:40 a.m.

## 2025-03-18 NOTE — PROGRESS NOTES
Daily Speech Treatment Note    Today's date: 3/18/2025   Patient’s name: Maira Moura  : 1955  MRN: [id number]  Safety measures: HTN, CVA, GERD, active CA, s/p PEG tube placement, aspiration precautions  Current recommended diet: PEG tube for primary nutrition/hydration; puree with thin liquids P.O. as tolerated   Referring provider: Harika Medina DO    Encounter Diagnosis     ICD-10-CM    1. Dysphagia, oropharyngeal phase  R13.12       2. Status post tonsillectomy  Z90.89       3. 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 She appeared more lively and upbeat, coming in with a smile.     She reports she has been trying peaches at home. Reporting mild globus and coughing; clearing with water.     Patient's DIL brought her to today's appt. She reports she had another appt this morning; which she used the [employer] service for and she can only use it once a day.     Objective/Assessment:      Short-term goals:  Patient will tolerate P.O. trials of her recommended diet with the implementation of safe swallowing strategies without overt s/sx of penetration and/or aspiration during skilled therapy sessions in this certification period (to be achieved in 4-6 weeks). -- PARTIALLY MET    Traditional VitalStim     Channel(s) used: 1, 2   Placement: 3a   Duty Cycle: 57/1 s   Frequency: 80 pulses per second   Phase Duration: 300 microseconds   Treatment Duration: 35 minutes   Min mA level: 5.0 mA   Max mA level: 5.0 mA      Patient tolerated NMES in conjunction with pharyngeal/laryngeal strengthening exercises and P.O. intake. (The patient received instruction on the potential benefits from NMES treatment,  including neuromuscular re-education and muscle strengthening.) Skin condition post-NMES: intact.      Patient consumed the following during today’s session: mandarin oranges in juice; and water (thin liquid) via side of cup -- NMES remained on during pharyngeal strengthening exercises (see below).      Cough x 1 with oranges  Cough x 1 with water      Patient without any c/o odynophagia. Patient appeared to have adequate anterior closure, A to P transfer, bolus formation/control, and swallow initiation.  No overt s/sx of penetration/aspiration noted during today's session. Patient implemented small bites and slow rate with P.O. intake, as well as liquid washes as needed.       Discussed her bringing in egg salad next session  GOAL: lasagna and to have feeding tube removed        Patient will perform oral motor exercises using IOPI device on lingual muscles to facilitate increased function and strength by 25% for improved swallowing function (to be achieved in 6-8 weeks).      Patient will independently complete pharyngeal strengthening exercises (e.g., Effortful swallow, Mendelsohn, Evelia) daily to facilitate increased pharyngeal strength and coordination (to be achieved in 4-6 weeks).     Plan:  -Patient was provided with home exercises/activities to target goals in plan of care at the end of today's session.  -Continue with current plan of care.

## 2025-03-18 NOTE — ASSESSMENT & PLAN NOTE
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 On completion of definitive chemoradiation, repeat PET-CT was obtained on December 27th, 2024; and demonstrated interval near-complete metabolic response to therapy. Given the above-mentioned, will proceed with active-surveillance at the present time. Patient expressed understanding and agreement with the above-mentioned plan.     Summary of Recommendations:  Continue active-surveillance:  Follow-up with Radiation Oncology, and Otolaryngology, as scheduled.  Continue surveillance-nasolaryngoscopy per Otolaryngology.  Patient to continue working with Speech-Therapy to improve swallowing-function.  Recommend close interval follow-up in approximately 3-weeks.

## 2025-03-18 NOTE — PROGRESS NOTES
Medical Oncology: Outpatient Progress Note:    Name: Maira Moura      : 1955      MRN: [id number]  Encounter Provider: Harika Medina DO  Encounter Date: 3/18/2025 Encounter department: [facility] [place]  Assessment & Plan  Metastatic colon cancer to liver (HCC)  Patient is a 69-year-old postmenopausal female, with a complex Oncologic history, including synchronous de naveed metastatic adenocarcinoma of the mid-ascending colon (Complicated by partial-obstruction requiring right hemicolectomy on 2024) with biopsy-proven oligometastatic disease to the liver (Status-post cryoablation on 2024) with further disease progression in 2024 (Currently on FOLFIRI with dose-attenuation of Irinotecan by 50-percent, and omission of 5-Fluorouracil bolus); who presents today for interval follow-up. 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 Unfortunately, patient now exhibits interval progression of disease involving the liver. She is also noted to have a new subcentimeter right lung nodule (Measuring 3-millimeters), and stable large right adnexal mass with low-level FDG-activity. Given the above-mentioned, patient was reinitiated on systemic-therapy with FOLFIRI with fifty-percent dose-reduction of Irinotecan due to existing diarrhea (Cycle 1 initiated on 2025). Patient has since completed two-cycles of the above-mentioned (Last administered on 2025). Cycle 3 was deferred and, ultimately, cancelled, and growth-factor support was administered in the interim, in the setting of neutropenia. Re-staging CT Chest, Abdomen, and Pelvis was obtained and demonstrated interval progression of hepatic metastatic  disease, as well as slight increase in size of a right lower lobe pulmonary nodule. Given the above-mentioned, patient was recommended to proceed to Cycle 4 of systemic-therapy with FOLFIRI with 50-percent dose-reduction of Irinotecan, and omission of 5-Fluorouracil bolus with Leucovorin on March 5th, 2025. She now presents for interval follow-up.    On evaluation this morning, patient is clinically well. She tolerated Cycle 4 with dose-attenuation and above modifications, well; with minimal treatment-related toxicities. Repeat laboratory-studies demonstrate leukocytosis with neutrophilic-predominance in the setting of ongoing growth-factor support (WBC: 10.56), stable normocytic anemia (Hemoglobin: 9.3 MCV: 94), stable mild elevation of alkaline phosphatase (ALP: 131), and mild hypoalbuminemia (Albumin: 3.3). Magnesium: 2.0. Will proceed to Cycle 5 of systemic-therapy on March 19th, 2025. Recommend close interval follow-up in approximately 2-weeks (See Below), prior to initiation of Cycle 6. Patient expressed understanding and agreement with the above plan.     Summary of Recommendations:  Continue three-times -weekly intravenous-hydration (Monday, Wednesday, and Friday).  Proceed to Cycle 5 Day 1 of FOLFIRI every 14-days on March 19th, 2025:  A. Note: Continue with 50% dose-reduction of Irinotecan, and omission of 5-FU bolus.  Recommend close interval follow-up on April 1st, 2025 at 11:40 a.m.       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 On completion of definitive chemoradiation, repeat PET-CT was obtained on December 27th, 2024; and demonstrated interval near-complete metabolic response to therapy. Given the above-mentioned, will proceed with active-surveillance  at the present time. Patient expressed understanding and agreement with the above-mentioned plan.     Summary of Recommendations:  Continue active-surveillance:  Follow-up with Radiation Oncology, and Otolaryngology, as scheduled.  Continue surveillance-nasolaryngoscopy per Otolaryngology.  Patient to continue working with Speech-Therapy to improve swallowing-function.  Recommend close interval follow-up in approximately 3-weeks.       History of Present Illness   Chief Complaint: Outpatient Follow-Up.  Interval Events: Maira Moura is a 69-year-old postmenopausal female, with a complex Oncologic history, including early-stage hormone-positive HER-2 negative right breast cancer (Status-post right lumpectomy with sentinel lymph node biopsy on December 20th, 2018 and whole-breast radiation completed in April 2019; On adjuvant endocrine-therapy with Anastrozole since February 2019), as well as synchronous de naveed metastatic adenocarcinoma of the mid-ascending colon (Complicated by partial-obstruction requiring right hemicolectomy on March 15th, 2024) with biopsy-proven oligometastatic disease to the liver (Status-post cryoablation on June 3rd, 2024), and unresectable 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      On evaluation this morning, patient appears markedly improved from previous. With respect to fatigue; she states that her energy levels are comparatively the same post-reinitiation of chemotherapy (e.g. In comparison to energy levels during her treatment-break). She remains relatively sedentary; and is spending upwards of 50-percent of her time resting (e.g. Watching television). She does attempt to exert herself; however, she becomes fatigued easily, even with basic activities of daily living. She was encouraged to continue  to be active, as tolerated (e.g. Understanding her limits). Additionally, patient remains largely dependent on her PEG-tube for nutrition. She is administering tube-feeds approximately three times per day. She continues to work with Speech Therapy, and is progressing appropriately. They are working on advancing her diet from pureed food-products. She has been trialing soft-foods at home, and continues to drink water, and small-volumes of Ensure. Patient denies odynophagia; although endorses continued weakness when swallowing. Overall, she is quite pleased with this progress. Her weight has slightly decreased from 161-160 to approximately 157-pounds. Regarding bowel-habits, patient states that her bowel-movements are much more controlled. Diarrhea is only occurring three-times weekly, at maximum; thus, she has not initiated Banana-Flakes. She denies any pain. She has chronic peripheral neuropathy involving the bilateral hands and feet; of which is stable from previous. Overall, patent feels that she is progressing in the right direction. She is comfortable proceeding to treatment, as scheduled.    Oncology History   Cancer Staging   Malignant neoplasm of right female breast (HCC)  Staging form: Breast, AJCC 8th Edition  - Pathologic: Stage IA (pT2, pN0, cM0, G2, ER: Positive, OK: Positive, HER2: Negative) - Signed by Rosalva Drake MD on 1/11/2019  Histologic grading system: 3 grade system    Metastatic colon cancer to liver (HCC)  Staging form: Colon and Rectum, AJCC 8th Edition  - Clinical stage from 7/13/2023: cT3, cN0, pM1 - Signed by Harika Medina DO on 9/28/2023  Total positive nodes: 0  - Pathologic: pT3, pN0, cM1 - Signed by Vickie Israel MD on 4/3/2024  Total positive nodes: 0    Right tonsillar squamous cell carcinoma (HCC)  Staging form: Pharynx - Oropharynx, AJCC 8th Edition  - Clinical stage from 7/27/2023: Stage III (cT1, cN3, cM0, p16+) - Signed by Evan Plummer MD on 7/27/2023  Stage  prefix: Initial diagnosis  Oncology History Overview Note   2/2019 - pT2N0 breast cancer treated with anastrozole, oncotype 13, ER+ NE+ Her2 neg     7/2023 - metastatic ascending colon adenocarcinoma to liver     Caris - KRAS G12D, CAIN, PD-L1 0%     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, unresectable     7/31/2023 - FOLFOX     11/20/2023 - opdivo added to FOLFOX     12/18/2023-cycle 11-20% dose reduction of 5-FU bolus and oxaliplatin due to increased fatigue     Jan 2024 - oxaliplatin removed from treatment plan due to neuropathy - PET scan shows good disease control in all areas except colon lesion     3/2024 - right hemicolectomy - pT3N0     6/3/24 - cryoablation to liver (no interruption to systemic therapy)     7/30/2024 - stop folfox     9/2024 - 10/2024 - cis/RT to tonsils and cervical Lns    12/2024 - disease progression of colon cancer in the liver    01/2025 - Initiated FOLFIRI every 14-days, with 50% dose-reduction of Irinotecan due to pre-existing diarrhea.    February 2025: systemic chemotherapy held due to patient experiencing prolonged side effects of diarrhea and neutropenia.     March 2025: resume C3 of FOLFIRI      Malignant neoplasm of right female breast (HCC)   11/23/2018 Initial Diagnosis    Malignant neoplasm of right female breast (HCC)     11/23/2018 Biopsy    Rt Breast US BX 1100 8cmfn, 4 passes 12g Marquee:  - Invasive breast carcinoma of no special type (ductal NST/invasive ductal carcinoma).  - grade 2  - %  - NE 95%  - HER2 negative     12/20/2018 Surgery    Right partial mastectomy and SLN biopsy:  Infiltrating ductal carcinoma, Grade 2/3, felt to be between 2.0 cm and 2.5 cm in greatest dimension  - No invasive tumor is seen at inked margins, but there is a focus of DCIS seen within approximately 1mm of the inked medial margin  - no LVI  - no LCIS  - 0/2 LN's  at least Stage IIA - pT2, pN0, cM0, G2.    - Dr Coronado     12/20/2018 -  Cancer Staged    Stage IIA -  "pT2, pN0, cM0, G2.       1/4/2019 Genomic Testing    Oncotype DX score: 13     2/1/2019 -  Hormone Therapy    anastrozole 1 mg daily as adjuvant endocrine therapy    - Dr Daley       2/14/2019 - 4/3/2019 Radiation    Course: C1    Plan ID Energy Fractions Dose per Fraction (cGy) Dose Correction (cGy) Total Dose Delivered (cGy) Elapsed Days   R Breast 10X/6X 25 / 25 200 0 5,000 40   R BRST BOOST 16E 5 / 5 200 0 1,000 7      Treatment dates:  C1: 2/14/2019 - 4/3/2019       Metastatic colon cancer to liver (HCC)   7/6/2023 Genetic Testing    CARIS Results:   KRAS Pathogenic Variant Exon 2  p.G12D : lack of benefit of cetuximab, panitumumab Level 2   No other actionable mutation; MSI stable; TMB low   MiFOLOXAi Results: \"Decreased Benefit of FOLFOX + Bevacizumab in first line metastatic CRC.  This patient may achieve improved results by receiving an alternative to FOLFOX, such as FOLFIRI, as their initial regimen. As an adjustment to frontline FOLFOXIRI following toxicity: This patient may achieve improved results by removing the oxaliplatin portion of their regimen\".         7/11/2023 Initial Diagnosis    Metastatic colon cancer to liver (HCC)     7/13/2023 -  Cancer Staged    Staging form: Colon and Rectum, AJCC 8th Edition  - Clinical stage from 7/13/2023: cT3, cN0, pM1 - Signed by Harika Medina DO on 9/28/2023  Total positive nodes: 0       7/31/2023 - 8/1/2024 Chemotherapy    cyanocobalamin, 1,000 mcg, Intramuscular, Every 30 days, 7 of 9 cycles  Administration: 1,000 mcg (5/9/2024), 1,000 mcg (5/24/2024), 1,000 mcg (6/20/2024), 1,000 mcg (7/3/2024), 1,000 mcg (7/18/2024), 1,000 mcg (8/1/2024)  potassium chloride, 20 mEq, Intravenous, Once, 2 of 2 cycles  Administration: 20 mEq (11/6/2023), 20 mEq (11/20/2023)  alteplase (CATHFLO), 2 mg, Intracatheter, Every 1 Minute as needed, 21 of 23 cycles  Administration: 2 mg (1/3/2024)  pegfilgrastim (NEULASTA), 6 mg, Subcutaneous, Once, 12 of 12 cycles  Administration: " 6 mg (10/25/2023), 6 mg (11/8/2023), 6 mg (11/22/2023), 6 mg (12/6/2023), 6 mg (12/20/2023), 6 mg (1/12/2024), 6 mg (1/25/2024), 6 mg (4/25/2024), 6 mg (5/9/2024), 6 mg (5/24/2024), 6 mg (6/20/2024)  fluorouracil (ADRUCIL), 895 mg, Intravenous, Once, 21 of 23 cycles  Dose modification: 320 mg/m2 (original dose 400 mg/m2, Cycle 11)  Administration: 900 mg (7/31/2023), 900 mg (8/14/2023), 900 mg (8/28/2023), 900 mg (9/11/2023), 900 mg (9/25/2023), 900 mg (10/9/2023), 900 mg (10/23/2023), 895 mg (11/6/2023), 895 mg (11/20/2023), 895 mg (12/4/2023), 700 mg (12/18/2023), 680 mg (1/10/2024), 680 mg (1/23/2024), 625 mg (4/23/2024), 625 mg (5/7/2024), 625 mg (5/22/2024), 625 mg (6/4/2024), 625 mg (6/18/2024), 625 mg (7/1/2024), 625 mg (7/16/2024), 625 mg (7/30/2024)  nivolumab (OPDIVO) IVPB, 240 mg (100 % of original dose 240 mg), Intravenous, Once, 13 of 15 cycles  Dose modification: 240 mg (original dose 240 mg, Cycle 9)  Administration: 240 mg (11/20/2023), 240 mg (12/4/2023), 240 mg (12/18/2023), 240 mg (1/10/2024), 240 mg (1/23/2024), 240 mg (4/23/2024), 240 mg (5/7/2024), 240 mg (5/22/2024), 240 mg (6/4/2024), 240 mg (6/18/2024), 240 mg (7/1/2024), 240 mg (7/16/2024), 240 mg (7/30/2024)  leucovorin calcium IVPB, 896 mg, Intravenous, Once, 21 of 23 cycles  Administration: 900 mg (7/31/2023), 900 mg (8/14/2023), 900 mg (8/28/2023), 900 mg (9/11/2023), 900 mg (9/25/2023), 900 mg (10/9/2023), 900 mg (10/23/2023), 900 mg (11/6/2023), 900 mg (11/20/2023), 900 mg (12/4/2023), 900 mg (12/18/2023), 850 mg (1/10/2024), 850 mg (1/23/2024), 800 mg (4/23/2024), 800 mg (5/7/2024), 800 mg (5/22/2024), 800 mg (6/4/2024), 800 mg (6/18/2024), 800 mg (7/1/2024), 800 mg (7/16/2024), 800 mg (7/30/2024)  oxaliplatin (ELOXATIN) chemo infusion, 85 mg/m2 = 190.4 mg, Intravenous, Once, 12 of 12 cycles  Dose modification: 68 mg/m2 (original dose 85 mg/m2, Cycle 11, Reason: Other (Must fill in a comment), Comment: increased  fatigue)  Administration: 190.4 mg (7/31/2023), 190.4 mg (8/14/2023), 200 mg (8/28/2023), 200 mg (9/11/2023), 200 mg (9/25/2023), 200 mg (10/9/2023), 200 mg (10/23/2023), 200 mg (11/6/2023), 200 mg (11/20/2023), 190.4 mg (12/4/2023), 150 mg (12/18/2023), 150 mg (1/10/2024)  fluorouracil (ADRUCIL) ambulatory infusion Soln, 1,200 mg/m2/day = 5,375 mg, Intravenous, Over 46 hours, 21 of 23 cycles     9/26/2023 -  Cancer Staged    Staging form: Colon and Rectum, AJCC 8th Edition  - Pathologic: pT3, pN0, cM1 - Signed by Vickie Israel MD on 4/3/2024  Total positive nodes: 0       3/12/2024 Surgery    A. Terminal ileum, appendix, right colon (right hemicolectomy):  - Adenocarcinoma (5.2cm)  - Forty-nine (49) lymph nodes negative for carcinoma (0/49)    - Acellular mucin present in one (1) lymph node   - See staging synoptic (ypT3N0)     1/8/2025 -  Chemotherapy    alteplase (CATHFLO), 2 mg, Intracatheter, Every 1 Minute as needed, 3 of 7 cycles  pegfilgrastim (NEULASTA), 6 mg, Subcutaneous, Once, 1 of 5 cycles  Administration: 6 mg (3/7/2025)  fluorouracil (ADRUCIL), 400 mg/m2 = 770 mg, Intravenous, Once, 2 of 2 cycles  Administration: 770 mg (1/8/2025), 770 mg (1/22/2025)  irinotecan (CAMPTOSAR) chemo infusion, 173 mg (50 % of original dose 180 mg/m2), Intravenous, Once, 3 of 7 cycles  Dose modification: 90 mg/m2 (original dose 180 mg/m2, Cycle 1, Reason: Anticipated Tolerance, Comment: 50% dose reduction due to pre-existing diarrhea)  Administration: 180 mg (1/8/2025), 180 mg (1/22/2025), 160 mg (3/5/2025)  leucovorin calcium IVPB, 768 mg, Intravenous, Once, 2 of 2 cycles  Administration: 800 mg (1/8/2025), 800 mg (1/22/2025)  fluorouracil (ADRUCIL) ambulatory infusion Soln, 1,200 mg/m2/day = 4,610 mg, Intravenous, Over 46 hours, 3 of 7 cycles     Right tonsillar squamous cell carcinoma (HCC)   7/27/2023 Initial Diagnosis    Right tonsillar squamous cell carcinoma (HCC)     7/27/2023 -  Cancer Staged    Staging form:  "Pharynx - Oropharynx, AJCC 8th Edition  - Clinical stage from 7/27/2023: Stage III (cT1, cN3, cM0, p16+) - Signed by Evan Plummer MD on 7/27/2023  Stage prefix: Initial diagnosis       9/16/2024 - 11/13/2024 Radiation      Plan ID Energy Fractions Dose per Fraction (cGy) Dose Correction (cGy) Total Dose Delivered (cGy) Elapsed Days   Head_Neck 6X-FFF 35 / 35 200 0 7,000 58    C2: 9/16/2024 - 11/13/2024 9/17/2024 - 10/21/2024 Chemotherapy    alteplase (CATHFLO), 2 mg, Intracatheter, Every 1 Minute as needed, 6 of 6 cycles  Administration: 2 mg (10/21/2024)  CISplatin (PLATINOL) infusion, 70 mg (original dose 40 mg/m2), Intravenous, Once, 6 of 6 cycles  Dose modification: 70 mg (original dose 40 mg/m2, Cycle 1, Reason: Anticipated Tolerance), 30 mg/m2 (original dose 40 mg/m2, Cycle 4, Reason: Neuropathy, Comment: dose reduction to 30 mg/m2 due to neuropathy)  Administration: 70 mg (9/17/2024), 70 mg (9/23/2024), 70 mg (9/30/2024), 59.1 mg (10/7/2024), 59.1 mg (10/14/2024), 59.1 mg (10/21/2024)  sodium chloride, 500 mL, Intravenous, Once, 6 of 6 cycles  Administration: 500 mL (9/17/2024), 500 mL (9/17/2024), 500 mL (9/23/2024), 500 mL (9/23/2024), 500 mL (9/30/2024), 500 mL (9/30/2024), 500 mL (10/7/2024), 500 mL (10/7/2024), 500 mL (10/14/2024), 500 mL (10/14/2024), 500 mL (10/21/2024)  fosaprepitant (EMEND) IVPB, 150 mg, Intravenous, Once, 6 of 6 cycles  Administration: 150 mg (9/17/2024), 150 mg (9/23/2024), 150 mg (9/30/2024), 150 mg (10/7/2024), 150 mg (10/14/2024), 150 mg (10/21/2024)  IVPB builder, , Intravenous, Once, 1 of 1 cycle  Administration: Unknown dose (10/21/2024)        Review of Systems  Review of Systems:  All systems reviewed and negative except otherwise listed in the History of Present Illness.      Objective   /70 (BP Location: Left arm, Patient Position: Sitting, Cuff Size: Adult)   Pulse 92   Temp 97.6 °F (36.4 °C) (Temporal)   Resp 16   Ht 5' 5.98\" (1.676 m)   Wt " 71.2 kg (157 lb)   SpO2 99%   BMI 25.36 kg/m²     ECOG   2-3-Points.    Physical Exam:  General: Alert, and oriented; Pleasant; Well-Appearing (Comparatively - Significantly Improved from Previous)  HEENT: Atraumatic, and normocephalic; PERRLA; EOMI; Moist membranes  Cardiovascular: Mildly Tachycardic (Regular); No murmurs, rubs, or gallops; No edema  Respiratory: Clear to auscultation bilaterally; Adequate aeration; No supplemental oxygen   Abdomen: Soft, Non-tender; Non-distended; No organomegaly; Bowel sounds present; PEG-Tube in Place  Extremities: No obvious deformities; No peripheral edema; Moves all  Neurologic: Appropriately alert, and oriented to Person, Place, and Time; No focal neurologic deficits (Ambulation Markedly Improved from Previous - Able to Transfer Out-of-Chair Quicker, and Easier, Than Previously Observed)    Labs: I have reviewed the following labs:  Lab Results   Component Value Date/Time    WBC 10.56 (H) 03/17/2025 11:19 AM    RBC 3.12 (L) 03/17/2025 11:19 AM    Hemoglobin 9.3 (L) 03/17/2025 11:19 AM    Hematocrit 29.2 (L) 03/17/2025 11:19 AM    MCV 94 03/17/2025 11:19 AM    MCH 29.8 03/17/2025 11:19 AM    RDW 16.0 (H) 03/17/2025 11:19 AM    Platelets 282 03/17/2025 11:19 AM    Segmented % 82 (H) 03/17/2025 11:19 AM    Lymphocytes % 8 (L) 03/17/2025 11:19 AM    Monocytes % 7 03/17/2025 11:19 AM    Eosinophils Relative 1 03/17/2025 11:19 AM    Basophils Relative 1 03/17/2025 11:19 AM    Immature Grans % 1 03/17/2025 11:19 AM    Absolute Neutrophils 8.86 (H) 03/17/2025 11:19 AM     Lab Results   Component Value Date/Time    Potassium 4.0 03/17/2025 11:19 AM    Chloride 100 03/17/2025 11:19 AM    CO2 30 03/17/2025 11:19 AM    BUN 23 03/17/2025 11:19 AM    Creatinine 1.05 03/17/2025 11:19 AM    Glucose, Fasting 102 (H) 10/30/2024 04:34 AM    Calcium 9.2 03/17/2025 11:19 AM    Corrected Calcium 9.8 03/17/2025 11:19 AM    AST 22 03/17/2025 11:19 AM    ALT 8 03/17/2025 11:19 AM    Alkaline  Phosphatase 131 (H) 03/17/2025 11:19 AM    Total Protein 7.4 03/17/2025 11:19 AM    Albumin 3.3 (L) 03/17/2025 11:19 AM    Total Bilirubin 0.30 03/17/2025 11:19 AM    eGFR 54 03/17/2025 11:19 AM     Patient was seen and discussed with attending physician, ALAINA Steve D.O.  Hematology-Oncology Fellow (PGY-5)

## 2025-03-18 NOTE — PROGRESS NOTES
Outpatient Oncology Nutrition Consultation   Type of Consult: Follow Up   Care Location: [facility]    Reason for referral: Received notification by [facility] PEPE ZAPATA on 8/21/24 that pt has triggered for oncology nutrition care (reason for referral: HNC dx and Malnutrition Screening Tool (MST) Triggers: scored a 0 indicating No recent wt loss and is not eating poorly due to a decreased appetite. 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   -7/31/2023 started on FOLFOX   -nivolumab added to cycle 9   -finished July 2024  Started chemo/RT  9/16/24 for HNC   -Cisplatin   -RT EOT 11/13/24  S/p PEG placement 10/2/24  S/p nasopharyngoscopy, left tonsillectomy 10/9/24  Colon cancer progression; started on FOLFIRI with a 50% dose reduction of the irinotecan 1/8/25  Oncology History Overview Note   2/2019 - pT2N0 breast cancer treated with anastrozole, oncotype 13, ER+ MO+ Her2 neg     7/2023 - metastatic ascending colon adenocarcinoma to liver     Caris - KRAS G12D, CAIN, PD-L1 0%     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, unresectable     7/31/2023 - FOLFOX     11/20/2023 - opdivo added to FOLFOX     12/18/2023-cycle 11-20% dose reduction of 5-FU bolus and oxaliplatin due to increased fatigue     Jan 2024 - oxaliplatin removed from treatment plan due to neuropathy - PET scan shows good disease control in all areas except colon lesion     3/2024 - right hemicolectomy - pT3N0     6/3/24 - cryoablation to liver (no interruption to systemic therapy)     7/30/2024 - stop folfox     9/2024 - 10/2024 - cis/RT to tonsils and cervical Lns    12/2024 - disease progression of colon cancer in the liver    01/2025 - Initiated  "FOLFIRI every 14-days, with 50% dose-reduction of Irinotecan due to pre-existing diarrhea.    February 2025: systemic chemotherapy held due to patient experiencing prolonged side effects of diarrhea and neutropenia.     March 2025: resume C3 of FOLFIRI      Malignant neoplasm of right female breast (HCC)   11/23/2018 Initial Diagnosis    Malignant neoplasm of right female breast (HCC)     11/23/2018 Biopsy    Rt Breast US BX 1100 8cmfn, 4 passes 12g Marquee:  - Invasive breast carcinoma of no special type (ductal NST/invasive ductal carcinoma).  - grade 2  - %  - MI 95%  - HER2 negative     12/20/2018 Surgery    Right partial mastectomy and SLN biopsy:  Infiltrating ductal carcinoma, Grade 2/3, felt to be between 2.0 cm and 2.5 cm in greatest dimension  - No invasive tumor is seen at inked margins, but there is a focus of DCIS seen within approximately 1mm of the inked medial margin  - no LVI  - no LCIS  - 0/2 LN's  at least Stage IIA - pT2, pN0, cM0, G2.    - Dr Coronado     12/20/2018 -  Cancer Staged    Stage IIA - pT2, pN0, cM0, G2.       1/4/2019 Genomic Testing    Oncotype DX score: 13     2/1/2019 -  Hormone Therapy    anastrozole 1 mg daily as adjuvant endocrine therapy    - Dr Daley       2/14/2019 - 4/3/2019 Radiation    Course: C1    Plan ID Energy Fractions Dose per Fraction (cGy) Dose Correction (cGy) Total Dose Delivered (cGy) Elapsed Days   R Breast 10X/6X 25 / 25 200 0 5,000 40   R BRST BOOST 16E 5 / 5 200 0 1,000 7      Treatment dates:  C1: 2/14/2019 - 4/3/2019       Metastatic colon cancer to liver (HCC)   7/6/2023 Genetic Testing    CARIS Results:   KRAS Pathogenic Variant Exon 2  p.G12D : lack of benefit of cetuximab, panitumumab Level 2   No other actionable mutation; MSI stable; TMB low   MiFOLOXAi Results: \"Decreased Benefit of FOLFOX + Bevacizumab in first line metastatic CRC.  This patient may achieve improved results by receiving an alternative to FOLFOX, such as FOLFIRI, as their " "initial regimen. As an adjustment to frontline FOLFOXIRI following toxicity: This patient may achieve improved results by removing the oxaliplatin portion of their regimen\".         7/11/2023 Initial Diagnosis    Metastatic colon cancer to liver (HCC)     7/13/2023 -  Cancer Staged    Staging form: Colon and Rectum, AJCC 8th Edition  - Clinical stage from 7/13/2023: cT3, cN0, pM1 - Signed by Harika Medina DO on 9/28/2023  Total positive nodes: 0       7/31/2023 - 8/1/2024 Chemotherapy    cyanocobalamin, 1,000 mcg, Intramuscular, Every 30 days, 7 of 9 cycles  Administration: 1,000 mcg (5/9/2024), 1,000 mcg (5/24/2024), 1,000 mcg (6/20/2024), 1,000 mcg (7/3/2024), 1,000 mcg (7/18/2024), 1,000 mcg (8/1/2024)  potassium chloride, 20 mEq, Intravenous, Once, 2 of 2 cycles  Administration: 20 mEq (11/6/2023), 20 mEq (11/20/2023)  alteplase (CATHFLO), 2 mg, Intracatheter, Every 1 Minute as needed, 21 of 23 cycles  Administration: 2 mg (1/3/2024)  pegfilgrastim (NEULASTA), 6 mg, Subcutaneous, Once, 12 of 12 cycles  Administration: 6 mg (10/25/2023), 6 mg (11/8/2023), 6 mg (11/22/2023), 6 mg (12/6/2023), 6 mg (12/20/2023), 6 mg (1/12/2024), 6 mg (1/25/2024), 6 mg (4/25/2024), 6 mg (5/9/2024), 6 mg (5/24/2024), 6 mg (6/20/2024)  fluorouracil (ADRUCIL), 895 mg, Intravenous, Once, 21 of 23 cycles  Dose modification: 320 mg/m2 (original dose 400 mg/m2, Cycle 11)  Administration: 900 mg (7/31/2023), 900 mg (8/14/2023), 900 mg (8/28/2023), 900 mg (9/11/2023), 900 mg (9/25/2023), 900 mg (10/9/2023), 900 mg (10/23/2023), 895 mg (11/6/2023), 895 mg (11/20/2023), 895 mg (12/4/2023), 700 mg (12/18/2023), 680 mg (1/10/2024), 680 mg (1/23/2024), 625 mg (4/23/2024), 625 mg (5/7/2024), 625 mg (5/22/2024), 625 mg (6/4/2024), 625 mg (6/18/2024), 625 mg (7/1/2024), 625 mg (7/16/2024), 625 mg (7/30/2024)  nivolumab (OPDIVO) IVPB, 240 mg (100 % of original dose 240 mg), Intravenous, Once, 13 of 15 cycles  Dose modification: 240 mg (original " dose 240 mg, Cycle 9)  Administration: 240 mg (11/20/2023), 240 mg (12/4/2023), 240 mg (12/18/2023), 240 mg (1/10/2024), 240 mg (1/23/2024), 240 mg (4/23/2024), 240 mg (5/7/2024), 240 mg (5/22/2024), 240 mg (6/4/2024), 240 mg (6/18/2024), 240 mg (7/1/2024), 240 mg (7/16/2024), 240 mg (7/30/2024)  leucovorin calcium IVPB, 896 mg, Intravenous, Once, 21 of 23 cycles  Administration: 900 mg (7/31/2023), 900 mg (8/14/2023), 900 mg (8/28/2023), 900 mg (9/11/2023), 900 mg (9/25/2023), 900 mg (10/9/2023), 900 mg (10/23/2023), 900 mg (11/6/2023), 900 mg (11/20/2023), 900 mg (12/4/2023), 900 mg (12/18/2023), 850 mg (1/10/2024), 850 mg (1/23/2024), 800 mg (4/23/2024), 800 mg (5/7/2024), 800 mg (5/22/2024), 800 mg (6/4/2024), 800 mg (6/18/2024), 800 mg (7/1/2024), 800 mg (7/16/2024), 800 mg (7/30/2024)  oxaliplatin (ELOXATIN) chemo infusion, 85 mg/m2 = 190.4 mg, Intravenous, Once, 12 of 12 cycles  Dose modification: 68 mg/m2 (original dose 85 mg/m2, Cycle 11, Reason: Other (Must fill in a comment), Comment: increased fatigue)  Administration: 190.4 mg (7/31/2023), 190.4 mg (8/14/2023), 200 mg (8/28/2023), 200 mg (9/11/2023), 200 mg (9/25/2023), 200 mg (10/9/2023), 200 mg (10/23/2023), 200 mg (11/6/2023), 200 mg (11/20/2023), 190.4 mg (12/4/2023), 150 mg (12/18/2023), 150 mg (1/10/2024)  fluorouracil (ADRUCIL) ambulatory infusion Soln, 1,200 mg/m2/day = 5,375 mg, Intravenous, Over 46 hours, 21 of 23 cycles     9/26/2023 -  Cancer Staged    Staging form: Colon and Rectum, AJCC 8th Edition  - Pathologic: pT3, pN0, cM1 - Signed by Vickie Israel MD on 4/3/2024  Total positive nodes: 0       3/12/2024 Surgery    A. Terminal ileum, appendix, right colon (right hemicolectomy):  - Adenocarcinoma (5.2cm)  - Forty-nine (49) lymph nodes negative for carcinoma (0/49)    - Acellular mucin present in one (1) lymph node   - See staging synoptic (ypT3N0)     1/8/2025 -  Chemotherapy    alteplase (CATHFLO), 2 mg, Intracatheter, Every 1  Minute as needed, 4 of 7 cycles  pegfilgrastim (NEULASTA), 6 mg, Subcutaneous, Once, 2 of 5 cycles  Administration: 6 mg (3/7/2025)  fluorouracil (ADRUCIL), 400 mg/m2 = 770 mg, Intravenous, Once, 2 of 2 cycles  Administration: 770 mg (1/8/2025), 770 mg (1/22/2025)  irinotecan (CAMPTOSAR) chemo infusion, 173 mg (50 % of original dose 180 mg/m2), Intravenous, Once, 4 of 7 cycles  Dose modification: 90 mg/m2 (original dose 180 mg/m2, Cycle 1, Reason: Anticipated Tolerance, Comment: 50% dose reduction due to pre-existing diarrhea)  Administration: 180 mg (1/8/2025), 180 mg (1/22/2025), 160 mg (3/5/2025)  leucovorin calcium IVPB, 768 mg, Intravenous, Once, 2 of 2 cycles  Administration: 800 mg (1/8/2025), 800 mg (1/22/2025)  fluorouracil (ADRUCIL) ambulatory infusion Soln, 1,200 mg/m2/day = 4,610 mg, Intravenous, Over 46 hours, 4 of 7 cycles     Right tonsillar squamous cell carcinoma (HCC)   7/27/2023 Initial Diagnosis    Right tonsillar squamous cell carcinoma (HCC)     7/27/2023 -  Cancer Staged    Staging form: Pharynx - Oropharynx, AJCC 8th Edition  - Clinical stage from 7/27/2023: Stage III (cT1, cN3, cM0, p16+) - Signed by Evan Plummer MD on 7/27/2023  Stage prefix: Initial diagnosis       9/16/2024 - 11/13/2024 Radiation      Plan ID Energy Fractions Dose per Fraction (cGy) Dose Correction (cGy) Total Dose Delivered (cGy) Elapsed Days   Head_Neck 6X-FFF 35 / 35 200 0 7,000 58    C2: 9/16/2024 - 11/13/2024 9/17/2024 - 10/21/2024 Chemotherapy    alteplase (CATHFLO), 2 mg, Intracatheter, Every 1 Minute as needed, 6 of 6 cycles  Administration: 2 mg (10/21/2024)  CISplatin (PLATINOL) infusion, 70 mg (original dose 40 mg/m2), Intravenous, Once, 6 of 6 cycles  Dose modification: 70 mg (original dose 40 mg/m2, Cycle 1, Reason: Anticipated Tolerance), 30 mg/m2 (original dose 40 mg/m2, Cycle 4, Reason: Neuropathy, Comment: dose reduction to 30 mg/m2 due to neuropathy)  Administration: 70 mg (9/17/2024),  70 mg (9/23/2024), 70 mg (9/30/2024), 59.1 mg (10/7/2024), 59.1 mg (10/14/2024), 59.1 mg (10/21/2024)  sodium chloride, 500 mL, Intravenous, Once, 6 of 6 cycles  Administration: 500 mL (9/17/2024), 500 mL (9/17/2024), 500 mL (9/23/2024), 500 mL (9/23/2024), 500 mL (9/30/2024), 500 mL (9/30/2024), 500 mL (10/7/2024), 500 mL (10/7/2024), 500 mL (10/14/2024), 500 mL (10/14/2024), 500 mL (10/21/2024)  fosaprepitant (EMEND) IVPB, 150 mg, Intravenous, Once, 6 of 6 cycles  Administration: 150 mg (9/17/2024), 150 mg (9/23/2024), 150 mg (9/30/2024), 150 mg (10/7/2024), 150 mg (10/14/2024), 150 mg (10/21/2024)  IVPB builder, , Intravenous, Once, 1 of 1 cycle  Administration: Unknown dose (10/21/2024)       Past Medical & Surgical Hx:   Patient Active Problem List   Diagnosis    Primary hypertension    Mixed hyperlipidemia    Malignant neoplasm of right female breast (HCC)    Vitamin D deficiency    Prediabetes    Right thyroid nodule    GERD (gastroesophageal reflux disease)    Obesity    Metastatic colon cancer to liver (HCC)    Right tonsillar squamous cell carcinoma (HCC)    Poor appetite    Nutritional anemia    Dehydration    Drug-induced constipation    Chemotherapy induced neutropenia (HCC)    Stage 3a chronic kidney disease (HCC)    Thrombocytopenia (HCC)    Neuropathy    Diastolic dysfunction    Hypokalemia    Leukemoid reaction    GOGO (acute kidney injury) (HCC)    Acute post-operative pain    At risk for constipation    At risk for delirium    Palliative care encounter    Physical deconditioning    History of CVA (cerebrovascular accident)    Chronic nasal congestion    Elevated LFTs    Vitamin B12 deficiency    Neoplastic malignant related fatigue    Sensation, choking    S/P percutaneous endoscopic gastrostomy (PEG) tube placement (HCC)    Pancytopenia due to chemotherapy (HCC)    Cancer related pain    Hypomagnesemia    Functional diarrhea    Antineoplastic chemotherapy induced anemia    Hypertensive heart and  renal disease with congestive heart failure (HCC)     Past Medical History:   Diagnosis Date    Anemia     Arthritis     Body mass index (BMI) 40.0-44.9, adult (HCC) 9/22/2023    Breast cancer (HCC) 12/17/2018    Cancer (HCC)     right breast, colon, liver, right tonsil    Cervical lymphadenopathy 3/21/2023    CT neck on 3/30/2023- Large right level 2A lymphadenopathy as described above and suspicious for neoplasm.  Correlation with histopathology is recommended.  Mild asymmetric prominence of the right palatine tonsil with otherwise no definitive nodular enhancing lesions identified along the course of the aerodigestive tract.     5/26/23- FNA of this node was nonrevealing for tissue etiology, but it d    Encounter for screening for HIV 07/07/2022    Follow-up examination 04/04/2023    GERD (gastroesophageal reflux disease)     Hyperlipidemia     Hypertension     Obesity     Stroke (HCC)     TIA     Stroke (HCC)     TIA     Transaminitis 09/22/2021    Vasomotor rhinitis 8/9/2018    Refilled flonase today. Stopped taking since January 2022     Past Surgical History:   Procedure Laterality Date    BREAST BIOPSY Right 11/23/2018    us guided bx cancer    BREAST SURGERY      COLONOSCOPY      ESOPHAGOSCOPY N/A 07/20/2023    Procedure: ESOPHAGOSCOPY;  Surgeon: David Chapa MD;  [facility];  Service: ENT    HEMICOLOECTOMY W/ ANASTOMOSIS Right 3/12/2024    Procedure: RIGHT COLECTOMY WITH ROBOTICS;  Surgeon: Vickie Israel MD;  [facility];  Service: Surgical Oncology    IR BIOPSY LIVER MASS  06/27/2023    IR CRYOABLATION  6/3/2024    IR GASTROSTOMY TUBE PLACEMENT  10/2/2024    IR PORT CHECK  01/03/2024    IR PORT PLACEMENT  07/28/2023    IR PORT STRIPPING  01/09/2024    LAPAROTOMY N/A 3/12/2024    Procedure: LAPAROTOMY EXPLORATORY W/ ROBOTICS;  Surgeon: Vickie Israel MD;  [facility];  Service: Surgical Oncology    WI BIOPSY NASOPHARYNX VISIBLE LESION SIMPLE N/A 10/9/2024     Procedure: NASOPHARYNGOSCOPY with biospy;  Surgeon: Juanito Matthew MD;  [facility]: [facility];  Service: ENT    MA [facility] INCL FLUOR GDNCE DX W/CELL WASHG SPX N/A 2023    Procedure: BRONCHOSCOPY;  Surgeon: David Chapa MD;  [facility];  Service: ENT    MA LARYNGOSCOPY W/BIOPSY MICROSCOPE/TELESCOPE N/A 2023    Procedure: MICRODIRECT LARYNGOSCOPY WITH BIOPSY;  Surgeon: David Chapa MD;  [facility];  Service: ENT    MA LARYNGOSCOPY W/WO TRACHEOSCOPY DX EXCEPT  N/A 10/9/2024    Procedure: DIRECT LARYNGOSCOPY;  Surgeon: Juanito Matthew MD;  [facility];  Service: ENT    MA MASTECTOMY PARTIAL W/AXILLARY LYMPHADENECTOMY Right 2018    Procedure: ULTRASOUND LOCALIZED PARTIAL MASTECTOMY W/SENTINEL NODE BIOPSY POSS AXILLARY DISSECTION;  Surgeon: Zahida Coronado MD;  Location: [facility];  Service: General    MA TONSILLECTOMY PRIMARY/SECONDARY AGE 13/> N/A 10/9/2024    Procedure: TONSILLECTOMY;  Surgeon: Juanito Matthew MD;  [facility]: [facility];  Service: ENT    TUBAL LIGATION      US GUIDED BREAST BIOPSY RIGHT COMPLETE Right 2018    US GUIDED INJECTION FOR RESEARCH STUDY  2018    US GUIDED INJECTION FOR RESEARCH STUDY  2018    US GUIDED INJECTION FOR RESEARCH STUDY  2019    US GUIDED LYMPH NODE BIOPSY RIGHT  2023       Review of Medications:   Vitamins, Supplements and Herbals: No, pt denies taking supplements    Current Outpatient Medications:     acetaminophen (TYLENOL) 160 mg/5 mL liquid, Take 20 mL (640 mg total) by mouth every 6 (six) hours as needed for mild pain for up to 10 days, Disp: 473 mL, Rfl: 3    anastrozole (ARIMIDEX) 1 mg tablet, Take 1 tablet (1 mg total) by mouth daily, Disp: 90 tablet, Rfl: 0    atorvastatin (LIPITOR) 40 mg tablet, Take 1 tablet (40 mg total) by mouth daily at bedtime, Disp: 30 tablet, Rfl: 2    cholestyramine (QUESTRAN) 4 g packet, Take 1 packet (4 g total) by mouth 3 (three) times a  day with meals, Disp: 90 packet, Rfl: 3    diphenhydramine, lidocaine, Al/Mg hydroxide, simethicone (Magic Mouthwash) SUSP, Swish and swallow 10 mL every 4 (four) hours as needed for mouth pain or discomfort (Patient not taking: Reported on 1/31/2025), Disp: 480 mL, Rfl: 2    docusate sodium (COLACE) 50 mg capsule, Take 1 capsule (50 mg total) by mouth 2 (two) times a day, Disp: 90 capsule, Rfl: 1    ergocalciferol (VITAMIN D2) 50,000 units, Take 1 capsule (50,000 Units total) by mouth once a week, Disp: 90 capsule, Rfl: 3    fluorouracil 4,390 mg in CADD/Elastomeric Infusion Device, Infuse 4,390 mg (1,200 mg/m2/day x 1.83 m2) into a catheter in a vein via infusion device over 46 hours for 2 days  Infusion planned for March 19, 2025., Disp: 1 each, Rfl: 0    folic acid (FOLVITE) 1 mg tablet, Take 1 tablet (1 mg total) by mouth in the morning, Disp: 90 tablet, Rfl: 3    gabapentin (NEURONTIN) 300 mg capsule, Take 1 pills in the morning, 1 pill at lunch and 2 pills before bed, Disp: 120 capsule, Rfl: 0    hydrocortisone 1 % ointment, , Disp: , Rfl:     ibuprofen (MOTRIN) 100 mg/5 mL suspension, Take 20 mL (400 mg total) by mouth every 6 (six) hours as needed for moderate pain (Patient not taking: Reported on 1/31/2025), Disp: 473 mL, Rfl: 0    lidocaine-prilocaine (EMLA) cream, Apply topically as needed for mild pain (Patient not taking: Reported on 12/6/2024), Disp: 30 g, Rfl: 3    losartan (COZAAR) 50 mg tablet, Take 1 tablet (50 mg total) by mouth daily, Disp: 90 tablet, Rfl: 5    magnesium Oxide (MAG-OX) 400 mg TABS, 1 tablet (400 mg total) by Per G Tube route 2 (two) times a day (Patient not taking: Reported on 1/31/2025), Disp: 60 tablet, Rfl: 0    mirtazapine (REMERON) 15 mg tablet, Take 1 tablet (15 mg total) by mouth daily at bedtime Patient is also on a 30 mg tablet, for a total dose of 45 mg, Disp: 30 tablet, Rfl: 0    mirtazapine (REMERON) 30 mg tablet, Take 1 tablet (30 mg total) by mouth daily at  bedtime, Disp: 30 tablet, Rfl: 0    nystatin (MYCOSTATIN) 500,000 units/5 mL suspension, Apply 5 mL (500,000 Units total) to the mouth or throat 4 (four) times a day, Disp: 600 mL, Rfl: 3    omeprazole (PriLOSEC) 20 mg delayed release capsule, Take 1 capsule (20 mg total) by mouth daily, Disp: 30 capsule, Rfl: 0    ondansetron (ZOFRAN) 8 mg tablet, Take 1 tablet (8 mg total) by mouth every 8 (eight) hours as needed for nausea or vomiting, Disp: 90 tablet, Rfl: 0    oxyCODONE (ROXICODONE) 5 mg/5 mL solution, Take 5 mL (5 mg total) by mouth every 6 (six) hours as needed for severe pain ((breakthrough pain)) Max Daily Amount: 20 mg, Disp: 473 mL, Rfl: 0    potassium chloride (Klor-Con M20) 20 mEq tablet, Take 1 tablet (20 mEq total) by mouth 2 (two) times a day (Patient not taking: Reported on 1/31/2025), Disp: 90 tablet, Rfl: 1    potassium chloride 10% oral solution, 15 mL (20 mEq total) by Per G Tube route 2 (two) times a day, Disp: 473 mL, Rfl: 0    prochlorperazine (COMPAZINE) 10 mg tablet, Take 1 tablet (10 mg total) by mouth every 6 (six) hours as needed for nausea or vomiting, Disp: 90 tablet, Rfl: 0    pyridoxine (VITAMIN B6) 50 mg tablet, Take 1 tablet (50 mg total) by mouth daily, Disp: 90 tablet, Rfl: 3    vitamin B-12 (VITAMIN B-12) 1,000 mcg tablet, , Disp: , Rfl:   No current facility-administered medications for this visit.    Facility-Administered Medications Ordered in Other Visits:     alteplase (CATHFLO) injection 2 mg, 2 mg, Intracatheter, Q1MIN PRN, Harika Agostino, DO    Atropine Sulfate injection 0.25 mg, 0.25 mg, Intravenous, Once, Harika Agostino, DO    irinotecan (CAMPTOSAR) 160 mg in sodium chloride 0.9 % 500 mL chemo infusion, 160 mg, Intravenous, Once, Harika Agostino, DO    sodium chloride 0.9 % bolus 1,000 mL, 1,000 mL, Intravenous, Once PRN, Harika Medina, DO, Last Rate: 1,000 mL/hr at 03/19/25 1225, 1,000 mL at 03/19/25 1225    Most Recent Lab Results:   Lab Results   Component Value  "Date    WBC 10.56 (H) 03/17/2025    NEUTROABS 8.86 (H) 03/17/2025    IRON 21 (L) 02/28/2025    TIBC 170.8 (L) 02/28/2025    FERRITIN 712 (H) 02/28/2025    CHOLESTEROL 167 04/24/2024    TRIG 137 04/24/2024    HDL 43 (L) 04/24/2024    LDLCALC 97 04/24/2024    ALT 8 03/17/2025    AST 22 03/17/2025    ALB 3.3 (L) 03/17/2025    SODIUM 136 03/17/2025    SODIUM 134 (L) 03/14/2025    K 4.0 03/17/2025    K 4.1 03/14/2025     03/17/2025    BUN 23 03/17/2025    BUN 19 03/14/2025    CREATININE 1.05 03/17/2025    CREATININE 1.00 03/14/2025    EGFR 54 03/17/2025    PHOS 2.8 11/01/2024    PHOS 3.3 10/30/2024    TSH 1.58 01/03/2022    GLUCOSE 209 (H) 03/12/2024    POCGLU 89 12/27/2024    GLUF 102 (H) 10/30/2024    GLUF 95 09/13/2024    GLUC 82 03/17/2025    HGBA1C 5.5 02/21/2024    HGBA1C 5.9 (H) 06/13/2023    HGBA1C 6.1 (H) 07/09/2022    CALCIUM 9.2 03/17/2025    FOLATE 14.1 02/28/2025    MG 2.0 03/17/2025       Anthropometric Measurements:   Height: 66\"  Ht Readings from Last 1 Encounters:   03/19/25 5' 5.98\" (1.676 m)     Wt Readings from Last 20 Encounters:   03/19/25 70.8 kg (156 lb 1.4 oz)   03/18/25 71.2 kg (157 lb)   03/05/25 73.1 kg (161 lb 2.5 oz)   02/28/25 72.6 kg (160 lb)   02/18/25 73 kg (161 lb)   02/04/25 73.4 kg (161 lb 12.8 oz)   01/31/25 75.1 kg (165 lb 9.6 oz)   01/30/25 75.6 kg (166 lb 10.7 oz)   01/22/25 75.6 kg (166 lb 10.7 oz)   01/10/25 77.1 kg (170 lb)   01/08/25 76.5 kg (168 lb 10.4 oz)   12/31/24 82.6 kg (182 lb)   12/20/24 82.1 kg (181 lb)   12/19/24 84.4 kg (186 lb)   12/06/24 84.4 kg (186 lb)   11/26/24 82.2 kg (181 lb 4 oz)   10/29/24 86.2 kg (190 lb)   10/24/24 86.6 kg (190 lb 14.7 oz)   10/21/24 86.6 kg (190 lb 14.7 oz)   10/15/24 88.5 kg (195 lb)       Weight History:   Usual Weight: 275#  Varian: (2/22/19) 264.6#, (4/2/19) 263.4, (9/16/24) 197.4#, (9/23/24) 194#, (9/30/24) 192.8#, (10/7/24) 190.2#, (10/11/24) 194.6#, (10/14/24) 191#, (10/17/24) 192.8#, (10/21/24) 190#, (10/24/24) 190#, " (10/28/24) 189#, (11/11/24) 185#  Home Scale: n/a    Oncology Nutrition-Anthropometrics      Flowsheet Row Nutrition from 3/19/2025 in [facility] Nutrition from 3/5/2025 in [facility]   Patient [age] (years): 70 years 70 years   Patient (female) height (in): 66 in 66 in   Current Weight to be used for anthropometric calculations (lbs) 156.1 lbs 161.2 lbs   Current Weight to be used for anthropometric calculations (kg) 71 kg 73.3 kg   BMI: 25.2 26   IBW female: 130 lbs 130 lbs   IBW (kg) female: 59.1 kg 59.1 kg   IBW % (female) 120.1 % 124 %   Adjusted BW (female): 136.5 lbs 137.8 lbs   Adjusted BW kg (female): 62 kg 62.6 kg   % weight change after 1 week: -- 0.8 %   Weight change after 1 week (lbs) -- 1.2 lbs   % weight change after 1 month: -3 % -0.4 %   Weight change after 1 month (lbs) -4.9 lbs -0.6 lbs   % weight change after 3 months: -16.1 % -13.3 %   Weight change after 3 months (lbs) -29.9 lbs -24.8 lbs   % weight change after 6 months: -19.9 % --   Weight change after 6 months (lbs) -38.9 lbs --            Nutrition-Focused Physical Findings: none observed    Food/Nutrition-Related History & Client/Social History:    Current Nutrition Impact Symptoms:  [] Nausea  [x] Reduced Appetite  [] Acid Reflux    [] Vomiting  [x] Unintended Wt Loss  [] Malabsorption    [] Diarrhea -improving - using banatrol as needed. Usually has diarrhea 1-2 times a week [] Unintended Wt Gain  [] Dumping Syndrome    [] Constipation  [] Thick Mucous/Secretions  [] Abdominal Pain    [] Dysgeusia (Altered Taste)  [x] Xerostomia (Dry Mouth)  [] Gas    [] Dysosmia (Altered Smell)  [] Thrush  [] Difficulty Chewing    [] Oral Mucositis (Sore Mouth)  [x] Fatigue  [] Hyperglycemia   Lab Results   Component Value Date    HGBA1C 5.5 02/21/2024      [x] Odynophagia  [] Esophagitis  [] Other:    [x] Dysphagia -enteral nutrition as 1' source of nutrition  Follows with  [facility]  Advanced to mechanical diet [] Early Satiety  [] No Problems Eating      Food Allergies & Intolerances: no    Current Diet: Tube Feeding. Mechanical diet, small amounts of PO  Current Nutrition Intake: Unchanged from last visit  Appetite: Fair   Nutrition Route: PEG as primary source, some PO  Oral Care: brushes daily  Activity level: ADLs.     24 Hr Diet Recall:   Cottage cheese and peaches  Spaghettios     Beverages: water (sips throughout the day- no longer using thickener packets)  Supplements:   Ensure Complete (10 oz, 350 kcal, 30 g pro) -once/day- drinks 1/2 and puts the other 1/2 through the tube    EN Recall:  DME: [employer]  Access Type: PEG  Formula: [employer] Peptide 1.5  Method: Bolus  Regimen: 11oz (325 mL) 2  times per day of [employer] 1.5 via PEG (gravity syringe method to administer boluses; 1 container infusing over ~15-20 minutes).  Flush: 60 mL free water flush  pre/post each bolus (120 mL x 2 boluses = 240 mL)  EN providin mL volume (2 cartons/day), 1000 kcal (43% est needs), 48g protein (52% est needs), 456 mL free water, 696 mL total water (30% est needs).  Pt also flushing 1-2 syringes every few hours for added hydration. Pt gets IVF 3x/week.       Oncology Nutrition-Estimated Needs      Flowsheet Row Nutrition from 2025 in [facility] Nutrition from 2024 in [facility]   Weight type used Actual weight Adjusted weight   Weight in kilograms (kg) used for estimated needs 77.3 kg 66.4 kg   Energy needs formula:  30-35 kcal/kg 30-35 kcal/kg   Energy needs based on 30 kcal/k kcal  kcal   Energy needs based on 35 kcal/k kcal 2323 kcal   Protein needs formula: 1.2-1.5 g/kg 1.2-1.5 g/kg   Protein needs based on 1.2 g/k g 80 g   Protein needs based on 1.5 g/kg 116 g 100 g   Fluid needs formula: 30-35 mL/kg 30-35 mL/kg   Fluid needs based on 30 mL/kg 2319 mL 1992 mL   Fluid  needs in ounces 78 oz 67 oz   Fluid needs based on 35 mL/kg 2706 mL 2324 mL   Fluid needs in ounces 91 oz 79 oz             Discussion & Intervention:   Maira was evaluated today for an RD follow up regarding HNC and enteral nutrition.  Maira has completed tx for HNC and is currently undergoing tx for metastatic colon cancer .  Met with Maira in the infusion center today. She continues to work with SLP- she's cleared for mechanical foods and thin liquids. She reports eating some food yesterday, but states she doesn't always have a desire to eat. She is drinking water throughout the day. She continues to use her PEG for the majority of her nutrition. She has been tolerating [employer] 1.5 and is doing about 2 cartons/day. Discussed that her goal is 4 cartons/day. She is also putting Ensure complete through her PEG or consuming orally usually once daily. Encouraged her to increase [employer] to at least 3 times daily, and continue one Ensure, as well as continue to increase her PO intake. Diarrhea has resolved. She experiences this maybe 1-2 times a week and uses banatrol on these days which helps. Weight is down a few pounds in the last month   Reviewed 24 hour recall, which revealed an inadequate enteral intake and PO intake, and discussed ways to increase kcal, protein, and fluid intakes and optimize nutrient intake.  Also reviewed the importance of wt management throughout the tx process and the role of enteral nutrition in managing wt and overall health.  Based on today's assessment, discussion included: MNT for:   fluid, soft/mechanical diet,  enteral nutrition, practicing proper oral care, adequate hydration & fluid choices, utilizing oral nutrition supplements, practicing proper feeding tube use & care, adherence to and compliance with enteral nutrition plan of care, and individualized enteral nutrition recommendations & plan:   .   Moving forward, Maira was encouraged to increase TF to goal  and  continue increasing oral intake   Materials Provided: Ensure samples, Ensure Coupons, and [employer] samples  All questions and concerns addressed during today’s visit.  Maira has RD contact information.    Nutrition Diagnosis:   Inadequate Energy Intake related to physiological causes, disease state and treatment related issues as evidenced by food recall, wt loss and discussion with pt and/or family.  Increased Nutrient Needs (kcal & pro) related to increased demand for nutrients and disease state as evidenced by cancer dx and pt undergoing tx for cancer.  Swallowing Difficulty related to diagnosis/treatment related causes as evidenced by  need for feeding tube, diet restriction per SLP.  Monitoring & Evaluation:   Goals:  weight maintenance/stabilization  adequate nutrition impact symptom management  pt to meet >/=75% estimated nutrition needs daily  achieve tube feeding goal rate    Progress Towards Goals: Progressing    Nutrition Rx & Recommendations:  Diet: enteral nutrition as 1' source of nutrition  Stay hydrated by sipping fluids of choice/tolerance throughout the day. Thickened liquids  Follow proper oral care; Try baking soda/salt water rinse recipe (mix 3/4 tsp salt + 1 tsp baking soda + 1 qt water; rinse with plain water after using) in Eating Hints book (pg 18).  Brush your teeth before/after meals & before bed.  Weigh yourself regularly. If you notice weight loss, make an effort to increase your daily food/calorie intake. If you continue to notice loss after these efforts, reach out to your dietitian to establish a plan to stabilize weight.   Continue with Ensure Plus/Complete 1-2 times daily. Can try drinking orally, or use through PEG if tolerated better  Choose liquids with calories: whole milk, Fairlife milk (higher protein/lactose-free milk), chocolate milk, drinkable yogurt, kefir, 100% fruit juice, diluted juice, bone broth (higher protein broth), creamy soups, sports drinks (Gatorade,  Poweraide, Pedialyte, etc.), Italian ice, popsicles, milkshakes, smoothies, oral nutrition supplements (Ensure, Boost, etc.), gelatin/Jello, etc.  Soft/easy to swallow foods that are high in calories and protein: casseroles, chicken/egg/tuna salad with extra garcia to add calories and moisture, oatmeal/cream of wheat made with whole milk, cottage cheese, whole milk Greek yogurt, scrambled eggs with cheese, avocado, macaroni and cheese, mashed potatoes made with butter and whole milk or dry milk powder, ground meat with extra sauce/gravy, canned fruit, peanut butter, cream soups (cream of chicken, cream of mushroom, broccoli cheddar), pudding made with whole milk, custard, ice cream, banana, milkshakes/smoothies, Review page 47 of Eating Hints Book for more ideas.   TF plan -   TF Goal:  Continue [place] 1.5, Increase this week to 3 times per day via PEG, increasing to 4 cartons per day as tolerated if PO intake does not improve  Water Flushin-120 mL free water flush pre/post each bolus (480 mL water) + additional 120 mL water flushes 4 times daily (480 mL water); adjusting prn on IV hydration days  Goal TF provides: 1300 mL total volume (4 cartons), 2000 kcal, 96g protein, 910 mL free water, 1870 mL total fluid   Contact RD for substitutions  use banatrol packets as needed for diarrhea  Call [employer] when you have 10 days left of TF supplies: [phone], option 3 (customer support).    Follow Up Plan:  during infusion on 25    Recommend Referral to Other Providers: none at this time

## 2025-03-19 ENCOUNTER — NUTRITION (OUTPATIENT)
Dept: NUTRITION | Facility: CLINIC | Age: 70
End: 2025-03-19

## 2025-03-19 ENCOUNTER — HOSPITAL ENCOUNTER (OUTPATIENT)
Dept: INFUSION CENTER | Facility: CLINIC | Age: 70
Discharge: HOME | End: 2025-03-19
Payer: MEDICARE

## 2025-03-19 VITALS
TEMPERATURE: 97.8 F | DIASTOLIC BLOOD PRESSURE: 83 MMHG | SYSTOLIC BLOOD PRESSURE: 129 MMHG | HEART RATE: 98 BPM | HEIGHT: 66 IN | RESPIRATION RATE: 18 BRPM | WEIGHT: 156.09 LBS | BODY MASS INDEX: 25.09 KG/M2

## 2025-03-19 DIAGNOSIS — Z17.0 MALIGNANT NEOPLASM OF UPPER-OUTER QUADRANT OF RIGHT BREAST IN FEMALE, ESTROGEN RECEPTOR POSITIVE (HCC): ICD-10-CM

## 2025-03-19 DIAGNOSIS — C18.9 METASTATIC COLON CANCER TO LIVER (HCC): Primary | ICD-10-CM

## 2025-03-19 DIAGNOSIS — C78.7 METASTATIC COLON CANCER TO LIVER (HCC): Primary | ICD-10-CM

## 2025-03-19 DIAGNOSIS — Z71.3 NUTRITIONAL COUNSELING: Primary | ICD-10-CM

## 2025-03-19 DIAGNOSIS — C50.411 MALIGNANT NEOPLASM OF UPPER-OUTER QUADRANT OF RIGHT BREAST IN FEMALE, ESTROGEN RECEPTOR POSITIVE (HCC): ICD-10-CM

## 2025-03-19 LAB
ALBUMIN SERPL BCG-MCNC: 3.2 G/DL (ref 3.5–5)
ALP SERPL-CCNC: 115 U/L (ref 34–104)
ALT SERPL W P-5'-P-CCNC: 9 U/L (ref 7–52)
ANION GAP SERPL CALCULATED.3IONS-SCNC: 8 MMOL/L (ref 4–13)
AST SERPL W P-5'-P-CCNC: 23 U/L (ref 13–39)
BASOPHILS # BLD AUTO: 0.05 THOUSANDS/ÂΜL (ref 0–0.1)
BASOPHILS NFR BLD AUTO: 1 % (ref 0–1)
BILIRUB SERPL-MCNC: 0.3 MG/DL (ref 0.2–1)
BUN SERPL-MCNC: 20 MG/DL (ref 5–25)
CALCIUM ALBUM COR SERPL-MCNC: 10.2 MG/DL (ref 8.3–10.1)
CALCIUM SERPL-MCNC: 9.6 MG/DL (ref 8.4–10.2)
CHLORIDE SERPL-SCNC: 99 MMOL/L (ref 96–108)
CO2 SERPL-SCNC: 28 MMOL/L (ref 21–32)
CREAT SERPL-MCNC: 1.01 MG/DL (ref 0.6–1.3)
EOSINOPHIL # BLD AUTO: 0.06 THOUSAND/ÂΜL (ref 0–0.61)
EOSINOPHIL NFR BLD AUTO: 1 % (ref 0–6)
ERYTHROCYTE [DISTWIDTH] IN BLOOD BY AUTOMATED COUNT: 15.7 % (ref 11.6–15.1)
GFR SERPL CREATININE-BSD FRML MDRD: 56 ML/MIN/1.73SQ M
GLUCOSE SERPL-MCNC: 99 MG/DL (ref 65–140)
HCT VFR BLD AUTO: 28.8 % (ref 34.8–46.1)
HGB BLD-MCNC: 9.3 G/DL (ref 11.5–15.4)
IMM GRANULOCYTES # BLD AUTO: 0.02 THOUSAND/UL (ref 0–0.2)
IMM GRANULOCYTES NFR BLD AUTO: 0 % (ref 0–2)
LYMPHOCYTES # BLD AUTO: 0.55 THOUSANDS/ÂΜL (ref 0.6–4.47)
LYMPHOCYTES NFR BLD AUTO: 8 % (ref 14–44)
MAGNESIUM SERPL-MCNC: 1.9 MG/DL (ref 1.9–2.7)
MCH RBC QN AUTO: 30 PG (ref 26.8–34.3)
MCHC RBC AUTO-ENTMCNC: 32.3 G/DL (ref 31.4–37.4)
MCV RBC AUTO: 93 FL (ref 82–98)
MONOCYTES # BLD AUTO: 0.51 THOUSAND/ÂΜL (ref 0.17–1.22)
MONOCYTES NFR BLD AUTO: 7 % (ref 4–12)
NEUTROPHILS # BLD AUTO: 6.19 THOUSANDS/ÂΜL (ref 1.85–7.62)
NEUTS SEG NFR BLD AUTO: 83 % (ref 43–75)
NRBC BLD AUTO-RTO: 0 /100 WBCS
PLATELET # BLD AUTO: 280 THOUSANDS/UL (ref 149–390)
PMV BLD AUTO: 10.4 FL (ref 8.9–12.7)
POTASSIUM SERPL-SCNC: 4 MMOL/L (ref 3.5–5.3)
PROT SERPL-MCNC: 7.3 G/DL (ref 6.4–8.4)
RBC # BLD AUTO: 3.1 MILLION/UL (ref 3.81–5.12)
SODIUM SERPL-SCNC: 135 MMOL/L (ref 135–147)
WBC # BLD AUTO: 7.38 THOUSAND/UL (ref 4.31–10.16)

## 2025-03-19 PROCEDURE — 80053 COMPREHEN METABOLIC PANEL: CPT | Performed by: INTERNAL MEDICINE

## 2025-03-19 PROCEDURE — 85025 COMPLETE CBC W/AUTO DIFF WBC: CPT | Performed by: INTERNAL MEDICINE

## 2025-03-19 PROCEDURE — 83735 ASSAY OF MAGNESIUM: CPT | Performed by: INTERNAL MEDICINE

## 2025-03-19 RX ORDER — ATROPINE SULFATE 1 MG/ML
0.25 INJECTION, SOLUTION INTRAVENOUS ONCE
Status: COMPLETED | OUTPATIENT
Start: 2025-03-19 | End: 2025-03-19

## 2025-03-19 RX ORDER — SODIUM CHLORIDE 9 MG/ML
20 INJECTION, SOLUTION INTRAVENOUS ONCE
Status: COMPLETED | OUTPATIENT
Start: 2025-03-19 | End: 2025-03-19

## 2025-03-19 RX ADMIN — IRINOTECAN HYDROCHLORIDE 160 MG: 20 INJECTION, SOLUTION INTRAVENOUS at 13:25

## 2025-03-19 RX ADMIN — ATROPINE SULFATE 0.25 MG: 1 INJECTION, SOLUTION INTRAVENOUS at 13:25

## 2025-03-19 RX ADMIN — SODIUM CHLORIDE 1000 ML: 0.9 INJECTION, SOLUTION INTRAVENOUS at 12:25

## 2025-03-19 RX ADMIN — SODIUM CHLORIDE 20 ML/HR: 0.9 INJECTION, SOLUTION INTRAVENOUS at 12:25

## 2025-03-19 RX ADMIN — DEXAMETHASONE SODIUM PHOSPHATE: 10 INJECTION, SOLUTION INTRAMUSCULAR; INTRAVENOUS at 12:27

## 2025-03-19 NOTE — PATIENT INSTRUCTIONS
Nutrition Rx & Recommendations:  Diet: enteral nutrition as 1' source of nutrition  Stay hydrated by sipping fluids of choice/tolerance throughout the day. Thickened liquids  Follow proper oral care; Try baking soda/salt water rinse recipe (mix 3/4 tsp salt + 1 tsp baking soda + 1 qt water; rinse with plain water after using) in Eating Hints book (pg 18).  Brush your teeth before/after meals & before bed.  Weigh yourself regularly. If you notice weight loss, make an effort to increase your daily food/calorie intake. If you continue to notice loss after these efforts, reach out to your dietitian to establish a plan to stabilize weight.   Continue with Ensure Plus/Complete 1-2 times daily. Can try drinking orally, or use through PEG if tolerated better  Choose liquids with calories: whole milk, Fairlife milk (higher protein/lactose-free milk), chocolate milk, drinkable yogurt, kefir, 100% fruit juice, diluted juice, bone broth (higher protein broth), creamy soups, sports drinks (Gatorade, Poweraide, Pedialyte, etc.), Italian ice, popsicles, milkshakes, smoothies, oral nutrition supplements (Ensure, Boost, etc.), gelatin/Jello, etc.  Soft/easy to swallow foods that are high in calories and protein: casseroles, chicken/egg/tuna salad with extra garcia to add calories and moisture, oatmeal/cream of wheat made with whole milk, cottage cheese, whole milk Greek yogurt, scrambled eggs with cheese, avocado, macaroni and cheese, mashed potatoes made with butter and whole milk or dry milk powder, ground meat with extra sauce/gravy, canned fruit, peanut butter, cream soups (cream of chicken, cream of mushroom, broccoli cheddar), pudding made with whole milk, custard, ice cream, banana, milkshakes/smoothies, Review page 47 of Eating Hints Book for more ideas.   TF plan -   TF Goal:  Continue [employer] 1.5, Increase this week to 3 times per day via PEG, increasing to 4 cartons per day as tolerated if PO intake does not  improve  Water Flushin-120 mL free water flush pre/post each bolus (480 mL water) + additional 120 mL water flushes 4 times daily (480 mL water); adjusting prn on IV hydration days  Goal TF provides: 1300 mL total volume (4 cartons), 2000 kcal, 96g protein, 910 mL free water, 1870 mL total fluid   Contact RD for substitutions  use banatrol packets as needed for diarrhea  Call [employer] when you have 10 days left of TF supplies: [phone], option 3 (customer support).    Follow Up Plan: during infusion on 25   Recommend Referral to Other Providers: none at this time

## 2025-03-19 NOTE — PROGRESS NOTES
Patient arrived to unit without complaint. Patient tolerated chemotherapy and NSS bolus without incident. 5FU BRANDIE connected and clamp verified independently open by Yeimi Mace RN. Patient aware of next appointment on 3/21/25 at 13:30. Patient left in stable condition.

## 2025-03-20 ENCOUNTER — PATIENT OUTREACH (OUTPATIENT)
Dept: HEMATOLOGY ONCOLOGY | Facility: CLINIC | Age: 70
End: 2025-03-20

## 2025-03-20 ENCOUNTER — OFFICE VISIT (OUTPATIENT)
Dept: SPEECH THERAPY | Facility: CLINIC | Age: 70
End: 2025-03-20
Payer: MEDICARE

## 2025-03-20 DIAGNOSIS — Z90.89 STATUS POST TONSILLECTOMY: ICD-10-CM

## 2025-03-20 DIAGNOSIS — C09.9 RIGHT TONSILLAR SQUAMOUS CELL CARCINOMA (HCC): ICD-10-CM

## 2025-03-20 DIAGNOSIS — R13.12 DYSPHAGIA, OROPHARYNGEAL PHASE: Primary | ICD-10-CM

## 2025-03-20 PROCEDURE — 92526 ORAL FUNCTION THERAPY: CPT | Performed by: SPEECH-LANGUAGE PATHOLOGIST

## 2025-03-20 NOTE — PROGRESS NOTES
Anushka from speech therapy called to clarify how to arrange transportation for pts future appointments.  I explained the pts schedule needs to be e mailed to  [facility]  so transportation can be arranged.,  I confirmed  [facility]  e mail with her. She understood, and was thankful for the assistance

## 2025-03-21 ENCOUNTER — HOSPITAL ENCOUNTER (OUTPATIENT)
Dept: INFUSION CENTER | Facility: CLINIC | Age: 70
Discharge: HOME | End: 2025-03-21
Payer: MEDICARE

## 2025-03-21 VITALS
HEART RATE: 94 BPM | SYSTOLIC BLOOD PRESSURE: 121 MMHG | DIASTOLIC BLOOD PRESSURE: 81 MMHG | TEMPERATURE: 97 F | RESPIRATION RATE: 18 BRPM

## 2025-03-21 DIAGNOSIS — I10 PRIMARY HYPERTENSION: ICD-10-CM

## 2025-03-21 DIAGNOSIS — C50.411 MALIGNANT NEOPLASM OF UPPER-OUTER QUADRANT OF RIGHT BREAST IN FEMALE, ESTROGEN RECEPTOR POSITIVE (HCC): ICD-10-CM

## 2025-03-21 DIAGNOSIS — C18.9 METASTATIC COLON CANCER TO LIVER (HCC): Primary | ICD-10-CM

## 2025-03-21 DIAGNOSIS — E55.9 VITAMIN D DEFICIENCY: ICD-10-CM

## 2025-03-21 DIAGNOSIS — C78.7 METASTATIC COLON CANCER TO LIVER (HCC): Primary | ICD-10-CM

## 2025-03-21 DIAGNOSIS — Z17.0 MALIGNANT NEOPLASM OF UPPER-OUTER QUADRANT OF RIGHT BREAST IN FEMALE, ESTROGEN RECEPTOR POSITIVE (HCC): ICD-10-CM

## 2025-03-21 DIAGNOSIS — R94.8 ABNORMAL PET SCAN OF COLON: ICD-10-CM

## 2025-03-21 LAB
ALBUMIN SERPL BCG-MCNC: 2.8 G/DL (ref 3.5–5)
ALP SERPL-CCNC: 96 U/L (ref 34–104)
ALT SERPL W P-5'-P-CCNC: 7 U/L (ref 7–52)
ANION GAP SERPL CALCULATED.3IONS-SCNC: 5 MMOL/L (ref 4–13)
AST SERPL W P-5'-P-CCNC: 22 U/L (ref 13–39)
BASOPHILS # BLD AUTO: 0.03 THOUSANDS/ÂΜL (ref 0–0.1)
BASOPHILS NFR BLD AUTO: 0 % (ref 0–1)
BILIRUB SERPL-MCNC: 0.28 MG/DL (ref 0.2–1)
BUN SERPL-MCNC: 20 MG/DL (ref 5–25)
CALCIUM ALBUM COR SERPL-MCNC: 9.5 MG/DL (ref 8.3–10.1)
CALCIUM SERPL-MCNC: 8.5 MG/DL (ref 8.4–10.2)
CHLORIDE SERPL-SCNC: 104 MMOL/L (ref 96–108)
CO2 SERPL-SCNC: 27 MMOL/L (ref 21–32)
CREAT SERPL-MCNC: 0.85 MG/DL (ref 0.6–1.3)
EOSINOPHIL # BLD AUTO: 0.03 THOUSAND/ÂΜL (ref 0–0.61)
EOSINOPHIL NFR BLD AUTO: 0 % (ref 0–6)
ERYTHROCYTE [DISTWIDTH] IN BLOOD BY AUTOMATED COUNT: 15.9 % (ref 11.6–15.1)
GFR SERPL CREATININE-BSD FRML MDRD: 70 ML/MIN/1.73SQ M
GLUCOSE SERPL-MCNC: 96 MG/DL (ref 65–140)
HCT VFR BLD AUTO: 25.8 % (ref 34.8–46.1)
HGB BLD-MCNC: 8.2 G/DL (ref 11.5–15.4)
IMM GRANULOCYTES # BLD AUTO: 0.02 THOUSAND/UL (ref 0–0.2)
IMM GRANULOCYTES NFR BLD AUTO: 0 % (ref 0–2)
LYMPHOCYTES # BLD AUTO: 0.57 THOUSANDS/ÂΜL (ref 0.6–4.47)
LYMPHOCYTES NFR BLD AUTO: 7 % (ref 14–44)
MAGNESIUM SERPL-MCNC: 2.8 MG/DL (ref 1.9–2.7)
MCH RBC QN AUTO: 30.7 PG (ref 26.8–34.3)
MCHC RBC AUTO-ENTMCNC: 31.8 G/DL (ref 31.4–37.4)
MCV RBC AUTO: 97 FL (ref 82–98)
MONOCYTES # BLD AUTO: 0.34 THOUSAND/ÂΜL (ref 0.17–1.22)
MONOCYTES NFR BLD AUTO: 4 % (ref 4–12)
NEUTROPHILS # BLD AUTO: 7.03 THOUSANDS/ÂΜL (ref 1.85–7.62)
NEUTS SEG NFR BLD AUTO: 89 % (ref 43–75)
NRBC BLD AUTO-RTO: 0 /100 WBCS
PLATELET # BLD AUTO: 227 THOUSANDS/UL (ref 149–390)
PMV BLD AUTO: 10.2 FL (ref 8.9–12.7)
POTASSIUM SERPL-SCNC: 3.8 MMOL/L (ref 3.5–5.3)
PROT SERPL-MCNC: 6.1 G/DL (ref 6.4–8.4)
RBC # BLD AUTO: 2.67 MILLION/UL (ref 3.81–5.12)
SODIUM SERPL-SCNC: 136 MMOL/L (ref 135–147)
WBC # BLD AUTO: 8.02 THOUSAND/UL (ref 4.31–10.16)

## 2025-03-21 PROCEDURE — 83735 ASSAY OF MAGNESIUM: CPT | Performed by: INTERNAL MEDICINE

## 2025-03-21 PROCEDURE — 80053 COMPREHEN METABOLIC PANEL: CPT | Performed by: INTERNAL MEDICINE

## 2025-03-21 PROCEDURE — 85025 COMPLETE CBC W/AUTO DIFF WBC: CPT | Performed by: INTERNAL MEDICINE

## 2025-03-21 PROCEDURE — 96365 THER/PROPH/DIAG IV INF INIT: CPT

## 2025-03-21 PROCEDURE — 96372 THER/PROPH/DIAG INJ SC/IM: CPT

## 2025-03-21 RX ORDER — ERGOCALCIFEROL 1.25 MG/1
50000 CAPSULE, LIQUID FILLED ORAL WEEKLY
Qty: 90 CAPSULE | Refills: 0 | Status: SHIPPED | OUTPATIENT
Start: 2025-03-21

## 2025-03-21 RX ORDER — OMEPRAZOLE 20 MG/1
20 CAPSULE, DELAYED RELEASE ORAL DAILY
Qty: 30 CAPSULE | Refills: 3 | Status: SHIPPED | OUTPATIENT
Start: 2025-03-21

## 2025-03-21 RX ORDER — LOSARTAN POTASSIUM 50 MG/1
50 TABLET ORAL DAILY
Qty: 90 TABLET | Refills: 2 | Status: SHIPPED | OUTPATIENT
Start: 2025-03-21

## 2025-03-21 RX ADMIN — PEGFILGRASTIM 6 MG: 6 INJECTION SUBCUTANEOUS at 13:44

## 2025-03-21 RX ADMIN — MAGNESIUM SULFATE HEPTAHYDRATE: 500 INJECTION, SOLUTION INTRAMUSCULAR; INTRAVENOUS at 13:36

## 2025-03-21 NOTE — PLAN OF CARE
Problem: Potential for Falls  Goal: Patient will remain free of falls  Description: INTERVENTIONS:- Educate patient/family on patient safety including physical limitations- Instruct patient to call for assistance with activity - Consult OT/PT to assist with strengthening/mobility - Keep Call bell within reach- Keep bed low and locked with side rails adjusted as appropriate- Keep care items and personal belongings within reach- Initiate and maintain comfort rounds- Make Fall Risk Sign visible to staff- - Apply yellow socks and bracelet for high fall risk patients- Consider moving patient to room near nurses station  Outcome: Progressing

## 2025-03-21 NOTE — TELEPHONE ENCOUNTER
Spoke with patient and she needs refills of her omeprazole, losartan and vitamin D. I informed her that we will send in the omemprazole and I will send her pcp a message to let them know she needs a refill on the losartan and vitamin D. Patient verbalized understanding and is in agreement with the plan.

## 2025-03-21 NOTE — PROGRESS NOTES
Pharmacy Kinetic Note  Day 8 vancomycin, azactam, and flagyl for the treatment of sacral decubitus with osteomyelitis. Steady state trough level = 14.7mg/L was drawn late. Extrapolated true trough = 15.89mg/L. Since extrapolated trough level is within therapeutic range of 15-20mg/L will continue current dosage of 2gm IV q12h.   Thank You,  Cara DownsD   Pt without complaint, 5FU BRANDIE completed over 46hrs, pt tolerated well. Pt presently receiving IV hydration with 2GM Mg, serum Mg=1.9. Pt received Neulasta as ordered on day 3 of treatment, OK given by provider to administer same day as pump disconnect.

## 2025-03-24 ENCOUNTER — HOSPITAL ENCOUNTER (OUTPATIENT)
Dept: INFUSION CENTER | Facility: CLINIC | Age: 70
Discharge: HOME | End: 2025-03-24
Payer: MEDICARE

## 2025-03-24 DIAGNOSIS — C50.411 MALIGNANT NEOPLASM OF UPPER-OUTER QUADRANT OF RIGHT BREAST IN FEMALE, ESTROGEN RECEPTOR POSITIVE (HCC): Primary | ICD-10-CM

## 2025-03-24 DIAGNOSIS — Z17.0 MALIGNANT NEOPLASM OF UPPER-OUTER QUADRANT OF RIGHT BREAST IN FEMALE, ESTROGEN RECEPTOR POSITIVE (HCC): Primary | ICD-10-CM

## 2025-03-24 LAB
ALBUMIN SERPL BCG-MCNC: 3.2 G/DL (ref 3.5–5)
ALP SERPL-CCNC: 154 U/L (ref 34–104)
ALT SERPL W P-5'-P-CCNC: 8 U/L (ref 7–52)
ANION GAP SERPL CALCULATED.3IONS-SCNC: 7 MMOL/L (ref 4–13)
AST SERPL W P-5'-P-CCNC: 25 U/L (ref 13–39)
BASOPHILS # BLD AUTO: 0.07 THOUSANDS/ÂΜL (ref 0–0.1)
BASOPHILS NFR BLD AUTO: 0 % (ref 0–1)
BILIRUB SERPL-MCNC: 0.38 MG/DL (ref 0.2–1)
BUN SERPL-MCNC: 21 MG/DL (ref 5–25)
CALCIUM ALBUM COR SERPL-MCNC: 10.1 MG/DL (ref 8.3–10.1)
CALCIUM SERPL-MCNC: 9.5 MG/DL (ref 8.4–10.2)
CHLORIDE SERPL-SCNC: 98 MMOL/L (ref 96–108)
CO2 SERPL-SCNC: 29 MMOL/L (ref 21–32)
CREAT SERPL-MCNC: 1.07 MG/DL (ref 0.6–1.3)
EOSINOPHIL # BLD AUTO: 0.15 THOUSAND/ÂΜL (ref 0–0.61)
EOSINOPHIL NFR BLD AUTO: 1 % (ref 0–6)
ERYTHROCYTE [DISTWIDTH] IN BLOOD BY AUTOMATED COUNT: 16.1 % (ref 11.6–15.1)
GFR SERPL CREATININE-BSD FRML MDRD: 53 ML/MIN/1.73SQ M
GLUCOSE SERPL-MCNC: 97 MG/DL (ref 65–140)
HCT VFR BLD AUTO: 29.4 % (ref 34.8–46.1)
HGB BLD-MCNC: 9.3 G/DL (ref 11.5–15.4)
IMM GRANULOCYTES # BLD AUTO: >0.5 THOUSAND/UL (ref 0–0.2)
IMM GRANULOCYTES NFR BLD AUTO: 6 % (ref 0–2)
LYMPHOCYTES # BLD AUTO: 0.84 THOUSANDS/ÂΜL (ref 0.6–4.47)
LYMPHOCYTES NFR BLD AUTO: 3 % (ref 14–44)
MAGNESIUM SERPL-MCNC: 1.9 MG/DL (ref 1.9–2.7)
MCH RBC QN AUTO: 30.7 PG (ref 26.8–34.3)
MCHC RBC AUTO-ENTMCNC: 31.6 G/DL (ref 31.4–37.4)
MCV RBC AUTO: 97 FL (ref 82–98)
MONOCYTES # BLD AUTO: 0.38 THOUSAND/ÂΜL (ref 0.17–1.22)
MONOCYTES NFR BLD AUTO: 1 % (ref 4–12)
NEUTROPHILS # BLD AUTO: 25.75 THOUSANDS/ÂΜL (ref 1.85–7.62)
NEUTS SEG NFR BLD AUTO: 89 % (ref 43–75)
NRBC BLD AUTO-RTO: 0 /100 WBCS
PLATELET # BLD AUTO: 249 THOUSANDS/UL (ref 149–390)
PMV BLD AUTO: 10.7 FL (ref 8.9–12.7)
POTASSIUM SERPL-SCNC: 4.1 MMOL/L (ref 3.5–5.3)
PROT SERPL-MCNC: 7.3 G/DL (ref 6.4–8.4)
RBC # BLD AUTO: 3.03 MILLION/UL (ref 3.81–5.12)
SODIUM SERPL-SCNC: 134 MMOL/L (ref 135–147)
WBC # BLD AUTO: 29.01 THOUSAND/UL (ref 4.31–10.16)

## 2025-03-24 PROCEDURE — 85025 COMPLETE CBC W/AUTO DIFF WBC: CPT | Performed by: INTERNAL MEDICINE

## 2025-03-24 PROCEDURE — 80053 COMPREHEN METABOLIC PANEL: CPT | Performed by: INTERNAL MEDICINE

## 2025-03-24 PROCEDURE — 96360 HYDRATION IV INFUSION INIT: CPT

## 2025-03-24 PROCEDURE — 83735 ASSAY OF MAGNESIUM: CPT | Performed by: INTERNAL MEDICINE

## 2025-03-24 RX ADMIN — SODIUM CHLORIDE 1000 ML: 0.9 INJECTION, SOLUTION INTRAVENOUS at 11:19

## 2025-03-24 NOTE — PROGRESS NOTES
Pt presents for NSS x1L. No new meds or concerns. Pt tolerated treatment without adverse reaction. Future appointments discussed, confirmed with patient for 3/26/25 1130. AVS given.

## 2025-03-25 ENCOUNTER — OFFICE VISIT (OUTPATIENT)
Dept: SPEECH THERAPY | Facility: CLINIC | Age: 70
End: 2025-03-25
Payer: MEDICARE

## 2025-03-25 DIAGNOSIS — C09.9 RIGHT TONSILLAR SQUAMOUS CELL CARCINOMA (HCC): ICD-10-CM

## 2025-03-25 DIAGNOSIS — Z90.89 STATUS POST TONSILLECTOMY: ICD-10-CM

## 2025-03-25 DIAGNOSIS — R13.12 DYSPHAGIA, OROPHARYNGEAL PHASE: Primary | ICD-10-CM

## 2025-03-25 PROCEDURE — 92526 ORAL FUNCTION THERAPY: CPT

## 2025-03-25 NOTE — PROGRESS NOTES
Daily Speech Treatment Note    Today's date: 3/25/2025  Patient’s name: Maira Moura  : 1955  MRN: [id number]  Safety measures: HTN, CVA, GERD, active CA, s/p PEG tube placement, aspiration precautions  Current recommended diet: PEG tube for primary nutrition/hydration; puree with thin liquids P.O. as tolerated   Referring provider: Harika Medina DO Encounter Diagnosis     ICD-10-CM    1. Dysphagia, oropharyngeal phase  R13.12       2. Status post tonsillectomy  Z90.89       3. 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     -Patient arrived with homemade egg salad    -tried macaroni and spagettios - macaroni got stuck, spagettios were better    Objective/Assessment:  -Reviewed patient's home exercises/activities completed since last appointment.      Short-term goals:  Patient will tolerate P.O. trials of her recommended diet with the implementation of safe swallowing strategies without overt s/sx of penetration and/or aspiration during skilled therapy sessions in this certification period (to be achieved in 4-6 weeks). -- PARTIALLY MET    Traditional VitalStim     Channel(s) used: 1, 2   Placement: 3a   Duty Cycle: 57/1 s   Frequency: 80 pulses per second   Phase Duration: 300 microseconds   Treatment Duration: 35 minutes   Min mA level: 7.0  mA   Max mA level: 10.0 mA      Patient tolerated NMES in conjunction with pharyngeal/laryngeal strengthening exercises and P.O. intake. (The patient received instruction on the potential benefits from NMES treatment, including neuromuscular re-education and muscle strengthening.) Skin condition post-NMES:  intact.      Patient consumed the following during today’s session:egg salad and water (thin liquid) via side of cup -- NMES remained on during IOPI exercises (see below).       Cough x 1 egg salad - patient was noted to take large bites of egg salad, and mild gag noted on large bite - cued to take 1/2 size bites - patient demonstrated compliance    Cough x 0 with water      Patient without any c/o odynophagia again today. Oral containment and bolus formation/control, AP transfer, and swallow initiation were judged to be WFL. Reduced hyolaryngeal elevation and excursion noted upon palpation. No oral cavity residue was observed. Patient did not report any c/o globus sensation. Cough x3 noted during session trials. Vocal quality returned to baseline status. No other overt s/sx of penetration/aspiration noted during today's session.     Patient will perform oral motor exercises using IOPI device on lingual muscles to facilitate increased function and strength by 25% for improved swallowing function (to be achieved in 6-8 weeks). -- PARTIALLY MET    Patient with significantly improved IOPI measures gathered during today's session. Clinician had patient practice exercises during session to assess performance for carryover with HEP. Patient was noted to fatigue on her 4th repetition of exercises.    IOPI Pro Pmax Data Tracker Grid (from [id number]):    (New bulb provided today)       01/09/25 01/14/25 01/23/25 03/20/25 03/25/25     Tongue tip (GOAL = 56) 50 49 49 70  58      Tongue back (GOAL = 50) 47 54 61 69  62           IOPI Pro -- Today's Exercise Targets:       Pmax (after 3 trials): Effort level: Target for today's treatment:   Tongue tip  (GOAL = 56) 58 80%    46   Tongue back  (GOAL = 50) 62 80%    50         IOPI Pro -- Exercise Tracker Grid (from [id number]):       01/09/25 01/14/25 01/23/25 03/20/25 03/25/25     Tongue tip 3 sets of 30 reps (pumps)    3 sets of 30 reps (pumps)     Endurance  holds (50% of max) x 2; avg 20    3 sets of 30 reps (pumps)     1 sets of 3 reps (20.47 second holds) 4 sets of 30 reps (pumps)  4 sets of 30 reps (pumps)     Tongue back 3 sets of 30 reps (pumps)    3 sets of 30 reps (pumps)     Endurance holds (50% of max) x 2; avg 23 sec    3 sets of 30 reps (pumps)     1 sets of 3 reps (16.92 second holds) 4 sets of 30 reps (pumps)  4 sets of 30 reps (pumps)        Patient will independently complete pharyngeal strengthening exercises (e.g., Effortful swallow, Mendelsohn, Evelia) daily to facilitate increased pharyngeal strength and coordination (to be achieved in 4-6 weeks). -- PARTIALLY MET    Plan:  -Patient was provided with home exercises/activities to target goals in plan of care at the end of today's session.  -Continue with current plan of care.

## 2025-03-26 ENCOUNTER — HOSPITAL ENCOUNTER (OUTPATIENT)
Dept: INFUSION CENTER | Facility: CLINIC | Age: 70
Discharge: HOME | End: 2025-03-26
Payer: MEDICARE

## 2025-03-26 VITALS
RESPIRATION RATE: 18 BRPM | HEART RATE: 79 BPM | TEMPERATURE: 97.5 F | SYSTOLIC BLOOD PRESSURE: 113 MMHG | DIASTOLIC BLOOD PRESSURE: 73 MMHG

## 2025-03-26 DIAGNOSIS — C50.411 MALIGNANT NEOPLASM OF UPPER-OUTER QUADRANT OF RIGHT BREAST IN FEMALE, ESTROGEN RECEPTOR POSITIVE (HCC): Primary | ICD-10-CM

## 2025-03-26 DIAGNOSIS — Z17.0 MALIGNANT NEOPLASM OF UPPER-OUTER QUADRANT OF RIGHT BREAST IN FEMALE, ESTROGEN RECEPTOR POSITIVE (HCC): Primary | ICD-10-CM

## 2025-03-26 LAB
ALBUMIN SERPL BCG-MCNC: 3.2 G/DL (ref 3.5–5)
ALP SERPL-CCNC: 129 U/L (ref 34–104)
ALT SERPL W P-5'-P-CCNC: 8 U/L (ref 7–52)
ANION GAP SERPL CALCULATED.3IONS-SCNC: 6 MMOL/L (ref 4–13)
ANISOCYTOSIS BLD QL SMEAR: PRESENT
AST SERPL W P-5'-P-CCNC: 22 U/L (ref 13–39)
BASOPHILS # BLD MANUAL: 0 THOUSAND/UL (ref 0–0.1)
BASOPHILS NFR MAR MANUAL: 0 % (ref 0–1)
BILIRUB SERPL-MCNC: 0.23 MG/DL (ref 0.2–1)
BUN SERPL-MCNC: 27 MG/DL (ref 5–25)
BURR CELLS BLD QL SMEAR: PRESENT
CALCIUM ALBUM COR SERPL-MCNC: 9.5 MG/DL (ref 8.3–10.1)
CALCIUM SERPL-MCNC: 8.9 MG/DL (ref 8.4–10.2)
CHLORIDE SERPL-SCNC: 100 MMOL/L (ref 96–108)
CO2 SERPL-SCNC: 28 MMOL/L (ref 21–32)
CREAT SERPL-MCNC: 0.98 MG/DL (ref 0.6–1.3)
EOSINOPHIL # BLD MANUAL: 0.13 THOUSAND/UL (ref 0–0.4)
EOSINOPHIL NFR BLD MANUAL: 1 % (ref 0–6)
ERYTHROCYTE [DISTWIDTH] IN BLOOD BY AUTOMATED COUNT: 16.2 % (ref 11.6–15.1)
GFR SERPL CREATININE-BSD FRML MDRD: 59 ML/MIN/1.73SQ M
GLUCOSE SERPL-MCNC: 94 MG/DL (ref 65–140)
HCT VFR BLD AUTO: 26 % (ref 34.8–46.1)
HGB BLD-MCNC: 8.3 G/DL (ref 11.5–15.4)
LG PLATELETS BLD QL SMEAR: PRESENT
LYMPHOCYTES # BLD AUTO: 0.93 THOUSAND/UL (ref 0.6–4.47)
LYMPHOCYTES # BLD AUTO: 5 % (ref 14–44)
MAGNESIUM SERPL-MCNC: 1.9 MG/DL (ref 1.9–2.7)
MCH RBC QN AUTO: 30.3 PG (ref 26.8–34.3)
MCHC RBC AUTO-ENTMCNC: 31.9 G/DL (ref 31.4–37.4)
MCV RBC AUTO: 95 FL (ref 82–98)
MICROCYTES BLD QL AUTO: PRESENT
MONOCYTES # BLD AUTO: 0.4 THOUSAND/UL (ref 0–1.22)
MONOCYTES NFR BLD: 3 % (ref 4–12)
NEUTROPHILS # BLD MANUAL: 11.77 THOUSAND/UL (ref 1.85–7.62)
NEUTS SEG NFR BLD AUTO: 89 % (ref 43–75)
OVALOCYTES BLD QL SMEAR: PRESENT
PLATELET # BLD AUTO: 205 THOUSANDS/UL (ref 149–390)
PLATELET BLD QL SMEAR: ADEQUATE
PMV BLD AUTO: 10.7 FL (ref 8.9–12.7)
POTASSIUM SERPL-SCNC: 4.3 MMOL/L (ref 3.5–5.3)
PROT SERPL-MCNC: 6.8 G/DL (ref 6.4–8.4)
RBC # BLD AUTO: 2.74 MILLION/UL (ref 3.81–5.12)
RBC MORPH BLD: PRESENT
SODIUM SERPL-SCNC: 134 MMOL/L (ref 135–147)
STOMATOCYTES BLD QL SMEAR: PRESENT
VARIANT LYMPHS # BLD AUTO: 2 %
WBC # BLD AUTO: 13.22 THOUSAND/UL (ref 4.31–10.16)

## 2025-03-26 PROCEDURE — 85007 BL SMEAR W/DIFF WBC COUNT: CPT | Performed by: INTERNAL MEDICINE

## 2025-03-26 PROCEDURE — 80053 COMPREHEN METABOLIC PANEL: CPT | Performed by: INTERNAL MEDICINE

## 2025-03-26 PROCEDURE — 83735 ASSAY OF MAGNESIUM: CPT | Performed by: INTERNAL MEDICINE

## 2025-03-26 PROCEDURE — 85027 COMPLETE CBC AUTOMATED: CPT | Performed by: INTERNAL MEDICINE

## 2025-03-26 PROCEDURE — 96365 THER/PROPH/DIAG IV INF INIT: CPT

## 2025-03-26 RX ADMIN — MAGNESIUM SULFATE HEPTAHYDRATE: 500 INJECTION, SOLUTION INTRAMUSCULAR; INTRAVENOUS at 12:21

## 2025-03-26 RX ADMIN — SODIUM CHLORIDE 1000 ML: 0.9 INJECTION, SOLUTION INTRAVENOUS at 12:20

## 2025-03-26 NOTE — PROGRESS NOTES
Pt presents for magnesium. No new meds or concerns. Pt tolerated treatment without adverse reaction. Future appointments discussed, confirmed with patient for 3/28/25 1200. AVS declined.

## 2025-03-27 ENCOUNTER — OFFICE VISIT (OUTPATIENT)
Dept: SPEECH THERAPY | Facility: CLINIC | Age: 70
End: 2025-03-27
Payer: MEDICARE

## 2025-03-27 DIAGNOSIS — C18.9 METASTATIC COLON CANCER TO LIVER (HCC): Primary | ICD-10-CM

## 2025-03-27 DIAGNOSIS — C78.7 METASTATIC COLON CANCER TO LIVER (HCC): Primary | ICD-10-CM

## 2025-03-27 DIAGNOSIS — R13.12 DYSPHAGIA, OROPHARYNGEAL PHASE: Primary | ICD-10-CM

## 2025-03-27 DIAGNOSIS — C09.9 RIGHT TONSILLAR SQUAMOUS CELL CARCINOMA (HCC): ICD-10-CM

## 2025-03-27 DIAGNOSIS — Z90.89 STATUS POST TONSILLECTOMY: ICD-10-CM

## 2025-03-27 PROCEDURE — 92526 ORAL FUNCTION THERAPY: CPT | Performed by: SPEECH-LANGUAGE PATHOLOGIST

## 2025-03-28 ENCOUNTER — HOSPITAL ENCOUNTER (OUTPATIENT)
Dept: INFUSION CENTER | Facility: CLINIC | Age: 70
End: 2025-03-28
Payer: MEDICARE

## 2025-03-28 VITALS
SYSTOLIC BLOOD PRESSURE: 96 MMHG | DIASTOLIC BLOOD PRESSURE: 63 MMHG | HEART RATE: 89 BPM | TEMPERATURE: 97.1 F | RESPIRATION RATE: 16 BRPM

## 2025-03-28 DIAGNOSIS — Z17.0 MALIGNANT NEOPLASM OF UPPER-OUTER QUADRANT OF RIGHT BREAST IN FEMALE, ESTROGEN RECEPTOR POSITIVE (HCC): Primary | ICD-10-CM

## 2025-03-28 DIAGNOSIS — C50.411 MALIGNANT NEOPLASM OF UPPER-OUTER QUADRANT OF RIGHT BREAST IN FEMALE, ESTROGEN RECEPTOR POSITIVE (HCC): Primary | ICD-10-CM

## 2025-03-28 LAB
ALBUMIN SERPL BCG-MCNC: 3.3 G/DL (ref 3.5–5)
ALP SERPL-CCNC: 132 U/L (ref 34–104)
ALT SERPL W P-5'-P-CCNC: 8 U/L (ref 7–52)
ANION GAP SERPL CALCULATED.3IONS-SCNC: 5 MMOL/L (ref 4–13)
AST SERPL W P-5'-P-CCNC: 22 U/L (ref 13–39)
BASOPHILS # BLD AUTO: 0.08 THOUSANDS/ÂΜL (ref 0–0.1)
BASOPHILS NFR BLD AUTO: 1 % (ref 0–1)
BILIRUB SERPL-MCNC: 0.24 MG/DL (ref 0.2–1)
BUN SERPL-MCNC: 26 MG/DL (ref 5–25)
CALCIUM ALBUM COR SERPL-MCNC: 9.7 MG/DL (ref 8.3–10.1)
CALCIUM SERPL-MCNC: 9.1 MG/DL (ref 8.4–10.2)
CHLORIDE SERPL-SCNC: 101 MMOL/L (ref 96–108)
CO2 SERPL-SCNC: 29 MMOL/L (ref 21–32)
CREAT SERPL-MCNC: 0.99 MG/DL (ref 0.6–1.3)
EOSINOPHIL # BLD AUTO: 0.15 THOUSAND/ÂΜL (ref 0–0.61)
EOSINOPHIL NFR BLD AUTO: 1 % (ref 0–6)
ERYTHROCYTE [DISTWIDTH] IN BLOOD BY AUTOMATED COUNT: 16.8 % (ref 11.6–15.1)
GFR SERPL CREATININE-BSD FRML MDRD: 58 ML/MIN/1.73SQ M
GLUCOSE SERPL-MCNC: 102 MG/DL (ref 65–140)
HCT VFR BLD AUTO: 25.6 % (ref 34.8–46.1)
HGB BLD-MCNC: 8.4 G/DL (ref 11.5–15.4)
IMM GRANULOCYTES # BLD AUTO: 0.09 THOUSAND/UL (ref 0–0.2)
IMM GRANULOCYTES NFR BLD AUTO: 1 % (ref 0–2)
LYMPHOCYTES # BLD AUTO: 0.73 THOUSANDS/ÂΜL (ref 0.6–4.47)
LYMPHOCYTES NFR BLD AUTO: 6 % (ref 14–44)
MAGNESIUM SERPL-MCNC: 2 MG/DL (ref 1.9–2.7)
MCH RBC QN AUTO: 30.8 PG (ref 26.8–34.3)
MCHC RBC AUTO-ENTMCNC: 32.8 G/DL (ref 31.4–37.4)
MCV RBC AUTO: 94 FL (ref 82–98)
MONOCYTES # BLD AUTO: 1.08 THOUSAND/ÂΜL (ref 0.17–1.22)
MONOCYTES NFR BLD AUTO: 9 % (ref 4–12)
NEUTROPHILS # BLD AUTO: 9.57 THOUSANDS/ÂΜL (ref 1.85–7.62)
NEUTS SEG NFR BLD AUTO: 82 % (ref 43–75)
NRBC BLD AUTO-RTO: 0 /100 WBCS
PLATELET # BLD AUTO: 231 THOUSANDS/UL (ref 149–390)
PMV BLD AUTO: 10.4 FL (ref 8.9–12.7)
POTASSIUM SERPL-SCNC: 4.1 MMOL/L (ref 3.5–5.3)
PROT SERPL-MCNC: 6.9 G/DL (ref 6.4–8.4)
RBC # BLD AUTO: 2.73 MILLION/UL (ref 3.81–5.12)
SODIUM SERPL-SCNC: 135 MMOL/L (ref 135–147)
WBC # BLD AUTO: 11.7 THOUSAND/UL (ref 4.31–10.16)

## 2025-03-28 PROCEDURE — 85025 COMPLETE CBC W/AUTO DIFF WBC: CPT | Performed by: INTERNAL MEDICINE

## 2025-03-28 PROCEDURE — 96365 THER/PROPH/DIAG IV INF INIT: CPT

## 2025-03-28 PROCEDURE — 83735 ASSAY OF MAGNESIUM: CPT | Performed by: INTERNAL MEDICINE

## 2025-03-28 PROCEDURE — 80053 COMPREHEN METABOLIC PANEL: CPT | Performed by: INTERNAL MEDICINE

## 2025-03-28 RX ORDER — ATROPINE SULFATE 1 MG/ML
0.25 INJECTION, SOLUTION INTRAVENOUS ONCE AS NEEDED
OUTPATIENT
Start: 2025-04-02

## 2025-03-28 RX ORDER — SODIUM CHLORIDE 9 MG/ML
20 INJECTION, SOLUTION INTRAVENOUS ONCE
OUTPATIENT
Start: 2025-04-02

## 2025-03-28 RX ORDER — ATROPINE SULFATE 1 MG/ML
0.25 INJECTION, SOLUTION INTRAVENOUS ONCE
OUTPATIENT
Start: 2025-04-02

## 2025-03-28 RX ADMIN — MAGNESIUM SULFATE HEPTAHYDRATE: 500 INJECTION, SOLUTION INTRAMUSCULAR; INTRAVENOUS at 12:46

## 2025-03-28 NOTE — PROGRESS NOTES
Pt presents for hydration. No new meds or concerns. Pt tolerated treatment without adverse reaction. Future appointments discussed, confirmed with patient for 3/31/25 1230. AVS declined.

## 2025-03-31 ENCOUNTER — HOSPITAL ENCOUNTER (OUTPATIENT)
Dept: INFUSION CENTER | Facility: CLINIC | Age: 70
Discharge: HOME | End: 2025-03-31
Payer: MEDICARE

## 2025-03-31 VITALS
RESPIRATION RATE: 18 BRPM | SYSTOLIC BLOOD PRESSURE: 104 MMHG | DIASTOLIC BLOOD PRESSURE: 67 MMHG | HEART RATE: 80 BPM | TEMPERATURE: 97.4 F

## 2025-03-31 DIAGNOSIS — Z17.0 MALIGNANT NEOPLASM OF UPPER-OUTER QUADRANT OF RIGHT BREAST IN FEMALE, ESTROGEN RECEPTOR POSITIVE (HCC): Primary | ICD-10-CM

## 2025-03-31 DIAGNOSIS — C50.411 MALIGNANT NEOPLASM OF UPPER-OUTER QUADRANT OF RIGHT BREAST IN FEMALE, ESTROGEN RECEPTOR POSITIVE (HCC): Primary | ICD-10-CM

## 2025-03-31 LAB
ALBUMIN SERPL BCG-MCNC: 3.5 G/DL (ref 3.5–5)
ALP SERPL-CCNC: 132 U/L (ref 34–104)
ALT SERPL W P-5'-P-CCNC: 9 U/L (ref 7–52)
ANION GAP SERPL CALCULATED.3IONS-SCNC: 6 MMOL/L (ref 4–13)
AST SERPL W P-5'-P-CCNC: 23 U/L (ref 13–39)
BASOPHILS # BLD AUTO: 0.06 THOUSANDS/ÂΜL (ref 0–0.1)
BASOPHILS NFR BLD AUTO: 1 % (ref 0–1)
BILIRUB SERPL-MCNC: 0.26 MG/DL (ref 0.2–1)
BUN SERPL-MCNC: 25 MG/DL (ref 5–25)
CALCIUM SERPL-MCNC: 9.5 MG/DL (ref 8.4–10.2)
CHLORIDE SERPL-SCNC: 99 MMOL/L (ref 96–108)
CO2 SERPL-SCNC: 28 MMOL/L (ref 21–32)
CREAT SERPL-MCNC: 0.98 MG/DL (ref 0.6–1.3)
EOSINOPHIL # BLD AUTO: 0.18 THOUSAND/ÂΜL (ref 0–0.61)
EOSINOPHIL NFR BLD AUTO: 2 % (ref 0–6)
ERYTHROCYTE [DISTWIDTH] IN BLOOD BY AUTOMATED COUNT: 16.7 % (ref 11.6–15.1)
GFR SERPL CREATININE-BSD FRML MDRD: 59 ML/MIN/1.73SQ M
GLUCOSE SERPL-MCNC: 100 MG/DL (ref 65–140)
HCT VFR BLD AUTO: 29.2 % (ref 34.8–46.1)
HGB BLD-MCNC: 9.4 G/DL (ref 11.5–15.4)
IMM GRANULOCYTES # BLD AUTO: 0.05 THOUSAND/UL (ref 0–0.2)
IMM GRANULOCYTES NFR BLD AUTO: 1 % (ref 0–2)
LYMPHOCYTES # BLD AUTO: 0.95 THOUSANDS/ÂΜL (ref 0.6–4.47)
LYMPHOCYTES NFR BLD AUTO: 11 % (ref 14–44)
MAGNESIUM SERPL-MCNC: 1.9 MG/DL (ref 1.9–2.7)
MCH RBC QN AUTO: 31.1 PG (ref 26.8–34.3)
MCHC RBC AUTO-ENTMCNC: 32.2 G/DL (ref 31.4–37.4)
MCV RBC AUTO: 97 FL (ref 82–98)
MONOCYTES # BLD AUTO: 0.84 THOUSAND/ÂΜL (ref 0.17–1.22)
MONOCYTES NFR BLD AUTO: 10 % (ref 4–12)
NEUTROPHILS # BLD AUTO: 6.37 THOUSANDS/ÂΜL (ref 1.85–7.62)
NEUTS SEG NFR BLD AUTO: 75 % (ref 43–75)
NRBC BLD AUTO-RTO: 0 /100 WBCS
PLATELET # BLD AUTO: 273 THOUSANDS/UL (ref 149–390)
PMV BLD AUTO: 10.7 FL (ref 8.9–12.7)
POTASSIUM SERPL-SCNC: 4 MMOL/L (ref 3.5–5.3)
PROT SERPL-MCNC: 7.6 G/DL (ref 6.4–8.4)
RBC # BLD AUTO: 3.02 MILLION/UL (ref 3.81–5.12)
SODIUM SERPL-SCNC: 133 MMOL/L (ref 135–147)
WBC # BLD AUTO: 8.45 THOUSAND/UL (ref 4.31–10.16)

## 2025-03-31 PROCEDURE — 83735 ASSAY OF MAGNESIUM: CPT | Performed by: INTERNAL MEDICINE

## 2025-03-31 PROCEDURE — 85025 COMPLETE CBC W/AUTO DIFF WBC: CPT | Performed by: INTERNAL MEDICINE

## 2025-03-31 PROCEDURE — 96360 HYDRATION IV INFUSION INIT: CPT

## 2025-03-31 PROCEDURE — 80053 COMPREHEN METABOLIC PANEL: CPT | Performed by: INTERNAL MEDICINE

## 2025-03-31 RX ADMIN — SODIUM CHLORIDE 1000 ML: 9 INJECTION, SOLUTION INTRAVENOUS at 12:41

## 2025-03-31 NOTE — PROGRESS NOTES
Pt tolerated IV hydration without incident.  Pt declined AVS but is aware of her next appt on 4/2 at 11:30

## 2025-04-01 ENCOUNTER — OFFICE VISIT (OUTPATIENT)
Dept: HEMATOLOGY ONCOLOGY | Facility: CLINIC | Age: 70
End: 2025-04-01
Payer: MEDICARE

## 2025-04-01 VITALS
RESPIRATION RATE: 16 BRPM | OXYGEN SATURATION: 97 % | TEMPERATURE: 97.6 F | SYSTOLIC BLOOD PRESSURE: 112 MMHG | WEIGHT: 160 LBS | BODY MASS INDEX: 25.71 KG/M2 | HEART RATE: 95 BPM | HEIGHT: 66 IN | DIASTOLIC BLOOD PRESSURE: 70 MMHG

## 2025-04-01 DIAGNOSIS — C09.9 RIGHT TONSILLAR SQUAMOUS CELL CARCINOMA (HCC): ICD-10-CM

## 2025-04-01 DIAGNOSIS — C78.7 METASTATIC COLON CANCER TO LIVER (HCC): Primary | ICD-10-CM

## 2025-04-01 DIAGNOSIS — C18.9 METASTATIC COLON CANCER TO LIVER (HCC): Primary | ICD-10-CM

## 2025-04-01 PROCEDURE — 99214 OFFICE O/P EST MOD 30 MIN: CPT | Performed by: INTERNAL MEDICINE

## 2025-04-01 PROCEDURE — G2211 COMPLEX E/M VISIT ADD ON: HCPCS | Performed by: INTERNAL MEDICINE

## 2025-04-01 RX ORDER — PROCHLORPERAZINE MALEATE 10 MG
10 TABLET ORAL EVERY 6 HOURS PRN
Qty: 90 TABLET | Refills: 2 | Status: SHIPPED | OUTPATIENT
Start: 2025-04-01

## 2025-04-01 RX ORDER — ATROPINE SULFATE 1 MG/ML
0.25 INJECTION, SOLUTION INTRAVENOUS ONCE
OUTPATIENT
Start: 2025-04-16

## 2025-04-01 RX ORDER — SODIUM CHLORIDE 9 MG/ML
20 INJECTION, SOLUTION INTRAVENOUS ONCE
OUTPATIENT
Start: 2025-04-16

## 2025-04-01 RX ORDER — ONDANSETRON 8 MG/1
8 TABLET, FILM COATED ORAL EVERY 8 HOURS PRN
Qty: 90 TABLET | Refills: 2 | Status: SHIPPED | OUTPATIENT
Start: 2025-04-01

## 2025-04-01 RX ORDER — ATROPINE SULFATE 1 MG/ML
0.25 INJECTION, SOLUTION INTRAVENOUS ONCE AS NEEDED
OUTPATIENT
Start: 2025-04-16

## 2025-04-02 ENCOUNTER — NUTRITION (OUTPATIENT)
Dept: NUTRITION | Facility: CLINIC | Age: 70
End: 2025-04-02

## 2025-04-02 ENCOUNTER — HOSPITAL ENCOUNTER (OUTPATIENT)
Dept: INFUSION CENTER | Facility: CLINIC | Age: 70
Discharge: HOME | End: 2025-04-02
Payer: MEDICARE

## 2025-04-02 ENCOUNTER — PREP FOR PROCEDURE (OUTPATIENT)
Dept: INTERVENTIONAL RADIOLOGY/VASCULAR | Facility: CLINIC | Age: 70
End: 2025-04-02

## 2025-04-02 VITALS
HEIGHT: 66 IN | DIASTOLIC BLOOD PRESSURE: 80 MMHG | BODY MASS INDEX: 25.72 KG/M2 | RESPIRATION RATE: 16 BRPM | TEMPERATURE: 96.5 F | SYSTOLIC BLOOD PRESSURE: 123 MMHG | HEART RATE: 97 BPM | WEIGHT: 160.05 LBS

## 2025-04-02 DIAGNOSIS — C18.9 METASTATIC COLON CANCER TO LIVER (HCC): Primary | ICD-10-CM

## 2025-04-02 DIAGNOSIS — Z17.0 MALIGNANT NEOPLASM OF UPPER-OUTER QUADRANT OF RIGHT BREAST IN FEMALE, ESTROGEN RECEPTOR POSITIVE (HCC): Primary | ICD-10-CM

## 2025-04-02 DIAGNOSIS — C18.9 METASTATIC COLON CANCER TO LIVER (HCC): ICD-10-CM

## 2025-04-02 DIAGNOSIS — C78.7 METASTATIC COLON CANCER TO LIVER (HCC): ICD-10-CM

## 2025-04-02 DIAGNOSIS — Z71.3 NUTRITIONAL COUNSELING: Primary | ICD-10-CM

## 2025-04-02 DIAGNOSIS — C50.411 MALIGNANT NEOPLASM OF UPPER-OUTER QUADRANT OF RIGHT BREAST IN FEMALE, ESTROGEN RECEPTOR POSITIVE (HCC): Primary | ICD-10-CM

## 2025-04-02 DIAGNOSIS — C78.7 METASTATIC COLON CANCER TO LIVER (HCC): Primary | ICD-10-CM

## 2025-04-02 LAB
ALBUMIN SERPL BCG-MCNC: 3.3 G/DL (ref 3.5–5)
ALP SERPL-CCNC: 115 U/L (ref 34–104)
ALT SERPL W P-5'-P-CCNC: 9 U/L (ref 7–52)
ANION GAP SERPL CALCULATED.3IONS-SCNC: 6 MMOL/L (ref 4–13)
AST SERPL W P-5'-P-CCNC: 23 U/L (ref 13–39)
BASOPHILS # BLD AUTO: 0.04 THOUSANDS/ÂΜL (ref 0–0.1)
BASOPHILS NFR BLD AUTO: 1 % (ref 0–1)
BILIRUB SERPL-MCNC: 0.27 MG/DL (ref 0.2–1)
BUN SERPL-MCNC: 22 MG/DL (ref 5–25)
CALCIUM ALBUM COR SERPL-MCNC: 10.1 MG/DL (ref 8.3–10.1)
CALCIUM SERPL-MCNC: 9.5 MG/DL (ref 8.4–10.2)
CHLORIDE SERPL-SCNC: 100 MMOL/L (ref 96–108)
CO2 SERPL-SCNC: 30 MMOL/L (ref 21–32)
CREAT SERPL-MCNC: 1.04 MG/DL (ref 0.6–1.3)
EOSINOPHIL # BLD AUTO: 0.14 THOUSAND/ÂΜL (ref 0–0.61)
EOSINOPHIL NFR BLD AUTO: 2 % (ref 0–6)
ERYTHROCYTE [DISTWIDTH] IN BLOOD BY AUTOMATED COUNT: 16.9 % (ref 11.6–15.1)
GFR SERPL CREATININE-BSD FRML MDRD: 54 ML/MIN/1.73SQ M
GLUCOSE SERPL-MCNC: 95 MG/DL (ref 65–140)
HCT VFR BLD AUTO: 28.2 % (ref 34.8–46.1)
HGB BLD-MCNC: 9 G/DL (ref 11.5–15.4)
IMM GRANULOCYTES # BLD AUTO: 0.03 THOUSAND/UL (ref 0–0.2)
IMM GRANULOCYTES NFR BLD AUTO: 0 % (ref 0–2)
LYMPHOCYTES # BLD AUTO: 0.73 THOUSANDS/ÂΜL (ref 0.6–4.47)
LYMPHOCYTES NFR BLD AUTO: 10 % (ref 14–44)
MAGNESIUM SERPL-MCNC: 1.9 MG/DL (ref 1.9–2.7)
MCH RBC QN AUTO: 30.5 PG (ref 26.8–34.3)
MCHC RBC AUTO-ENTMCNC: 31.9 G/DL (ref 31.4–37.4)
MCV RBC AUTO: 96 FL (ref 82–98)
MONOCYTES # BLD AUTO: 0.75 THOUSAND/ÂΜL (ref 0.17–1.22)
MONOCYTES NFR BLD AUTO: 10 % (ref 4–12)
NEUTROPHILS # BLD AUTO: 5.62 THOUSANDS/ÂΜL (ref 1.85–7.62)
NEUTS SEG NFR BLD AUTO: 77 % (ref 43–75)
NRBC BLD AUTO-RTO: 0 /100 WBCS
PLATELET # BLD AUTO: 272 THOUSANDS/UL (ref 149–390)
PMV BLD AUTO: 10.9 FL (ref 8.9–12.7)
POTASSIUM SERPL-SCNC: 3.8 MMOL/L (ref 3.5–5.3)
PROT SERPL-MCNC: 7.1 G/DL (ref 6.4–8.4)
RBC # BLD AUTO: 2.95 MILLION/UL (ref 3.81–5.12)
SODIUM SERPL-SCNC: 136 MMOL/L (ref 135–147)
WBC # BLD AUTO: 7.31 THOUSAND/UL (ref 4.31–10.16)

## 2025-04-02 PROCEDURE — 96413 CHEMO IV INFUSION 1 HR: CPT

## 2025-04-02 PROCEDURE — 96375 TX/PRO/DX INJ NEW DRUG ADDON: CPT

## 2025-04-02 PROCEDURE — G0498 CHEMO EXTEND IV INFUS W/PUMP: HCPCS

## 2025-04-02 PROCEDURE — 85025 COMPLETE CBC W/AUTO DIFF WBC: CPT | Performed by: INTERNAL MEDICINE

## 2025-04-02 PROCEDURE — 96367 TX/PROPH/DG ADDL SEQ IV INF: CPT

## 2025-04-02 PROCEDURE — 80053 COMPREHEN METABOLIC PANEL: CPT | Performed by: INTERNAL MEDICINE

## 2025-04-02 PROCEDURE — 83735 ASSAY OF MAGNESIUM: CPT | Performed by: INTERNAL MEDICINE

## 2025-04-02 RX ORDER — ATROPINE SULFATE 1 MG/ML
0.25 INJECTION, SOLUTION INTRAVENOUS ONCE AS NEEDED
Status: DISCONTINUED | OUTPATIENT
Start: 2025-04-02 | End: 2025-04-05 | Stop reason: HOSPADM

## 2025-04-02 RX ORDER — SODIUM CHLORIDE 9 MG/ML
20 INJECTION, SOLUTION INTRAVENOUS ONCE
Status: COMPLETED | OUTPATIENT
Start: 2025-04-02 | End: 2025-04-02

## 2025-04-02 RX ORDER — ATROPINE SULFATE 1 MG/ML
0.25 INJECTION, SOLUTION INTRAVENOUS ONCE
Status: COMPLETED | OUTPATIENT
Start: 2025-04-02 | End: 2025-04-02

## 2025-04-02 RX ADMIN — IRINOTECAN HYDROCHLORIDE 160 MG: 20 INJECTION, SOLUTION INTRAVENOUS at 13:35

## 2025-04-02 RX ADMIN — DEXAMETHASONE SODIUM PHOSPHATE: 10 INJECTION, SOLUTION INTRAMUSCULAR; INTRAVENOUS at 13:02

## 2025-04-02 RX ADMIN — MAGNESIUM SULFATE HEPTAHYDRATE: 500 INJECTION, SOLUTION INTRAMUSCULAR; INTRAVENOUS at 11:53

## 2025-04-02 RX ADMIN — ATROPINE SULFATE 0.25 MG: 1 INJECTION, SOLUTION INTRAVENOUS at 13:31

## 2025-04-02 RX ADMIN — SODIUM CHLORIDE 20 ML/HR: 9 INJECTION, SOLUTION INTRAVENOUS at 11:53

## 2025-04-02 NOTE — PROGRESS NOTES
Pt presents for irinotecan, magnesium, 5FU BRANDIE. No new meds or concerns. Pt tolerated treatment without adverse reaction. Future appointments discussed, confirmed with patient for 4/4/25 at 1530. AVS declined.

## 2025-04-02 NOTE — PROGRESS NOTES
Outpatient Oncology Nutrition Consultation   Type of Consult: Follow Up   Care Location: [facility]    Reason for referral: Received notification by [facility] PEPE ZAPATA on 8/21/24 that pt has triggered for oncology nutrition care (reason for referral: HNC dx and Malnutrition Screening Tool (MST) Triggers: scored a 0 indicating No recent wt loss and is not eating poorly due to a decreased appetite. 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   -7/31/2023 started on FOLFOX   -nivolumab added to cycle 9   -finished July 2024  Started chemo/RT  9/16/24 for HNC   -Cisplatin   -RT EOT 11/13/24  S/p PEG placement 10/2/24  S/p nasopharyngoscopy, left tonsillectomy 10/9/24  Colon cancer progression; started on FOLFIRI with a 50% dose reduction of the irinotecan 1/8/25  Oncology History Overview Note   2/2019 - pT2N0 breast cancer treated with anastrozole, oncotype 13, ER+ MS+ Her2 neg     7/2023 - metastatic ascending colon adenocarcinoma to liver     Caris - KRAS G12D, CAIN, PD-L1 0%     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, unresectable     7/31/2023 - FOLFOX     11/20/2023 - opdivo added to FOLFOX     12/18/2023-cycle 11-20% dose reduction of 5-FU bolus and oxaliplatin due to increased fatigue     Jan 2024 - oxaliplatin removed from treatment plan due to neuropathy - PET scan shows good disease control in all areas except colon lesion     3/2024 - right hemicolectomy - pT3N0     6/3/24 - cryoablation to liver (no interruption to systemic therapy)     7/30/2024 - stop folfox     9/2024 - 10/2024 - cis/RT to tonsils and cervical Lns    12/2024 - disease progression of colon cancer in the liver    01/2025 - Initiated  "FOLFIRI every 14-days, with 50% dose-reduction of Irinotecan due to pre-existing diarrhea.    February 2025: systemic chemotherapy held due to patient experiencing prolonged side effects of diarrhea and neutropenia.     March 2025: resume C3 of FOLFIRI      Malignant neoplasm of right female breast (HCC)   11/23/2018 Initial Diagnosis    Malignant neoplasm of right female breast (HCC)     11/23/2018 Biopsy    Rt Breast US BX 1100 8cmfn, 4 passes 12g Marquee:  - Invasive breast carcinoma of no special type (ductal NST/invasive ductal carcinoma).  - grade 2  - %  - RI 95%  - HER2 negative     12/20/2018 Surgery    Right partial mastectomy and SLN biopsy:  Infiltrating ductal carcinoma, Grade 2/3, felt to be between 2.0 cm and 2.5 cm in greatest dimension  - No invasive tumor is seen at inked margins, but there is a focus of DCIS seen within approximately 1mm of the inked medial margin  - no LVI  - no LCIS  - 0/2 LN's  at least Stage IIA - pT2, pN0, cM0, G2.    - Dr Croonado     12/20/2018 -  Cancer Staged    Stage IIA - pT2, pN0, cM0, G2.       1/4/2019 Genomic Testing    Oncotype DX score: 13     2/1/2019 -  Hormone Therapy    anastrozole 1 mg daily as adjuvant endocrine therapy    - Dr Daley       2/14/2019 - 4/3/2019 Radiation    Course: C1    Plan ID Energy Fractions Dose per Fraction (cGy) Dose Correction (cGy) Total Dose Delivered (cGy) Elapsed Days   R Breast 10X/6X 25 / 25 200 0 5,000 40   R BRST BOOST 16E 5 / 5 200 0 1,000 7      Treatment dates:  C1: 2/14/2019 - 4/3/2019       Metastatic colon cancer to liver (HCC)   7/6/2023 Genetic Testing    CARIS Results:   KRAS Pathogenic Variant Exon 2  p.G12D : lack of benefit of cetuximab, panitumumab Level 2   No other actionable mutation; MSI stable; TMB low   MiFOLOXAi Results: \"Decreased Benefit of FOLFOX + Bevacizumab in first line metastatic CRC.  This patient may achieve improved results by receiving an alternative to FOLFOX, such as FOLFIRI, as their " "initial regimen. As an adjustment to frontline FOLFOXIRI following toxicity: This patient may achieve improved results by removing the oxaliplatin portion of their regimen\".         7/11/2023 Initial Diagnosis    Metastatic colon cancer to liver (HCC)     7/13/2023 -  Cancer Staged    Staging form: Colon and Rectum, AJCC 8th Edition  - Clinical stage from 7/13/2023: cT3, cN0, pM1 - Signed by Harika Medina DO on 9/28/2023  Total positive nodes: 0       7/31/2023 - 8/1/2024 Chemotherapy    cyanocobalamin, 1,000 mcg, Intramuscular, Every 30 days, 7 of 9 cycles  Administration: 1,000 mcg (5/9/2024), 1,000 mcg (5/24/2024), 1,000 mcg (6/20/2024), 1,000 mcg (7/3/2024), 1,000 mcg (7/18/2024), 1,000 mcg (8/1/2024)  potassium chloride, 20 mEq, Intravenous, Once, 2 of 2 cycles  Administration: 20 mEq (11/6/2023), 20 mEq (11/20/2023)  alteplase (CATHFLO), 2 mg, Intracatheter, Every 1 Minute as needed, 21 of 23 cycles  Administration: 2 mg (1/3/2024)  pegfilgrastim (NEULASTA), 6 mg, Subcutaneous, Once, 12 of 12 cycles  Administration: 6 mg (10/25/2023), 6 mg (11/8/2023), 6 mg (11/22/2023), 6 mg (12/6/2023), 6 mg (12/20/2023), 6 mg (1/12/2024), 6 mg (1/25/2024), 6 mg (4/25/2024), 6 mg (5/9/2024), 6 mg (5/24/2024), 6 mg (6/20/2024)  fluorouracil (ADRUCIL), 895 mg, Intravenous, Once, 21 of 23 cycles  Dose modification: 320 mg/m2 (original dose 400 mg/m2, Cycle 11)  Administration: 900 mg (7/31/2023), 900 mg (8/14/2023), 900 mg (8/28/2023), 900 mg (9/11/2023), 900 mg (9/25/2023), 900 mg (10/9/2023), 900 mg (10/23/2023), 895 mg (11/6/2023), 895 mg (11/20/2023), 895 mg (12/4/2023), 700 mg (12/18/2023), 680 mg (1/10/2024), 680 mg (1/23/2024), 625 mg (4/23/2024), 625 mg (5/7/2024), 625 mg (5/22/2024), 625 mg (6/4/2024), 625 mg (6/18/2024), 625 mg (7/1/2024), 625 mg (7/16/2024), 625 mg (7/30/2024)  nivolumab (OPDIVO) IVPB, 240 mg (100 % of original dose 240 mg), Intravenous, Once, 13 of 15 cycles  Dose modification: 240 mg (original " dose 240 mg, Cycle 9)  Administration: 240 mg (11/20/2023), 240 mg (12/4/2023), 240 mg (12/18/2023), 240 mg (1/10/2024), 240 mg (1/23/2024), 240 mg (4/23/2024), 240 mg (5/7/2024), 240 mg (5/22/2024), 240 mg (6/4/2024), 240 mg (6/18/2024), 240 mg (7/1/2024), 240 mg (7/16/2024), 240 mg (7/30/2024)  leucovorin calcium IVPB, 896 mg, Intravenous, Once, 21 of 23 cycles  Administration: 900 mg (7/31/2023), 900 mg (8/14/2023), 900 mg (8/28/2023), 900 mg (9/11/2023), 900 mg (9/25/2023), 900 mg (10/9/2023), 900 mg (10/23/2023), 900 mg (11/6/2023), 900 mg (11/20/2023), 900 mg (12/4/2023), 900 mg (12/18/2023), 850 mg (1/10/2024), 850 mg (1/23/2024), 800 mg (4/23/2024), 800 mg (5/7/2024), 800 mg (5/22/2024), 800 mg (6/4/2024), 800 mg (6/18/2024), 800 mg (7/1/2024), 800 mg (7/16/2024), 800 mg (7/30/2024)  oxaliplatin (ELOXATIN) chemo infusion, 85 mg/m2 = 190.4 mg, Intravenous, Once, 12 of 12 cycles  Dose modification: 68 mg/m2 (original dose 85 mg/m2, Cycle 11, Reason: Other (Must fill in a comment), Comment: increased fatigue)  Administration: 190.4 mg (7/31/2023), 190.4 mg (8/14/2023), 200 mg (8/28/2023), 200 mg (9/11/2023), 200 mg (9/25/2023), 200 mg (10/9/2023), 200 mg (10/23/2023), 200 mg (11/6/2023), 200 mg (11/20/2023), 190.4 mg (12/4/2023), 150 mg (12/18/2023), 150 mg (1/10/2024)  fluorouracil (ADRUCIL) ambulatory infusion Soln, 1,200 mg/m2/day = 5,375 mg, Intravenous, Over 46 hours, 21 of 23 cycles     9/26/2023 -  Cancer Staged    Staging form: Colon and Rectum, AJCC 8th Edition  - Pathologic: pT3, pN0, cM1 - Signed by Vickie Israel MD on 4/3/2024  Total positive nodes: 0       3/12/2024 Surgery    A. Terminal ileum, appendix, right colon (right hemicolectomy):  - Adenocarcinoma (5.2cm)  - Forty-nine (49) lymph nodes negative for carcinoma (0/49)    - Acellular mucin present in one (1) lymph node   - See staging synoptic (ypT3N0)     1/8/2025 -  Chemotherapy    fluorouracil (ADRUCIL), 400 mg/m2 = 770 mg, 2 of 2  cycles  Administration: 770 mg (1/8/2025), 770 mg (1/22/2025)  irinotecan (CAMPTOSAR) chemo infusion, 173 mg (50 % of original dose 180 mg/m2), 5 of 8 cycles  Dose modification: 90 mg/m2 (original dose 180 mg/m2, Cycle 1, Reason: Anticipated Tolerance, Comment: 50% dose reduction due to pre-existing diarrhea)  Administration: 180 mg (1/8/2025), 180 mg (1/22/2025), 160 mg (3/5/2025), 160 mg (3/19/2025)  leucovorin calcium IVPB, 768 mg, 2 of 2 cycles  Administration: 800 mg (1/8/2025), 800 mg (1/22/2025)  fluorouracil (ADRUCIL) ambulatory infusion Soln, 1,200 mg/m2/day = 4,610 mg, 5 of 8 cycles     Right tonsillar squamous cell carcinoma (HCC)   7/27/2023 Initial Diagnosis    Right tonsillar squamous cell carcinoma (HCC)     7/27/2023 -  Cancer Staged    Staging form: Pharynx - Oropharynx, AJCC 8th Edition  - Clinical stage from 7/27/2023: Stage III (cT1, cN3, cM0, p16+) - Signed by Evan Plummer MD on 7/27/2023  Stage prefix: Initial diagnosis       9/16/2024 - 11/13/2024 Radiation      Plan ID Energy Fractions Dose per Fraction (cGy) Dose Correction (cGy) Total Dose Delivered (cGy) Elapsed Days   Head_Neck 6X-FFF 35 / 35 200 0 7,000 58    C2: 9/16/2024 - 11/13/2024 9/17/2024 - 10/21/2024 Chemotherapy    alteplase (CATHFLO), 2 mg, Intracatheter, Every 1 Minute as needed, 6 of 6 cycles  Administration: 2 mg (10/21/2024)  CISplatin (PLATINOL) infusion, 70 mg (original dose 40 mg/m2), Intravenous, Once, 6 of 6 cycles  Dose modification: 70 mg (original dose 40 mg/m2, Cycle 1, Reason: Anticipated Tolerance), 30 mg/m2 (original dose 40 mg/m2, Cycle 4, Reason: Neuropathy, Comment: dose reduction to 30 mg/m2 due to neuropathy)  Administration: 70 mg (9/17/2024), 70 mg (9/23/2024), 70 mg (9/30/2024), 59.1 mg (10/7/2024), 59.1 mg (10/14/2024), 59.1 mg (10/21/2024)  sodium chloride, 500 mL, Intravenous, Once, 6 of 6 cycles  Administration: 500 mL (9/17/2024), 500 mL (9/17/2024), 500 mL (9/23/2024), 500 mL  (9/23/2024), 500 mL (9/30/2024), 500 mL (9/30/2024), 500 mL (10/7/2024), 500 mL (10/7/2024), 500 mL (10/14/2024), 500 mL (10/14/2024), 500 mL (10/21/2024)  fosaprepitant (EMEND) IVPB, 150 mg, Intravenous, Once, 6 of 6 cycles  Administration: 150 mg (9/17/2024), 150 mg (9/23/2024), 150 mg (9/30/2024), 150 mg (10/7/2024), 150 mg (10/14/2024), 150 mg (10/21/2024)  IVPB builder, , Intravenous, Once, 1 of 1 cycle  Administration: Unknown dose (10/21/2024)       Past Medical & Surgical Hx:   Patient Active Problem List   Diagnosis    Primary hypertension    Mixed hyperlipidemia    Malignant neoplasm of right female breast (HCC)    Vitamin D deficiency    Prediabetes    Right thyroid nodule    GERD (gastroesophageal reflux disease)    Obesity    Metastatic colon cancer to liver (HCC)    Right tonsillar squamous cell carcinoma (HCC)    Poor appetite    Nutritional anemia    Dehydration    Drug-induced constipation    Chemotherapy induced neutropenia (HCC)    Stage 3a chronic kidney disease (HCC)    Thrombocytopenia (HCC)    Neuropathy    Diastolic dysfunction    Hypokalemia    Leukemoid reaction    GOGO (acute kidney injury) (HCC)    Acute post-operative pain    At risk for constipation    At risk for delirium    Palliative care encounter    Physical deconditioning    History of CVA (cerebrovascular accident)    Chronic nasal congestion    Elevated LFTs    Vitamin B12 deficiency    Neoplastic malignant related fatigue    Sensation, choking    S/P percutaneous endoscopic gastrostomy (PEG) tube placement (HCC)    Pancytopenia due to chemotherapy (HCC)    Cancer related pain    Hypomagnesemia    Functional diarrhea    Antineoplastic chemotherapy induced anemia    Hypertensive heart and renal disease with congestive heart failure (HCC)     Past Medical History:   Diagnosis Date    Anemia     Arthritis     Body mass index (BMI) 40.0-44.9, adult (HCC) 9/22/2023    Breast cancer (HCC) 12/17/2018    Cancer (HCC)     right breast,  colon, liver, right tonsil    Cervical lymphadenopathy 3/21/2023    CT neck on 3/30/2023- Large right level 2A lymphadenopathy as described above and suspicious for neoplasm.  Correlation with histopathology is recommended.  Mild asymmetric prominence of the right palatine tonsil with otherwise no definitive nodular enhancing lesions identified along the course of the aerodigestive tract.     5/26/23- FNA of this node was nonrevealing for tissue etiology, but it d    Encounter for screening for HIV 07/07/2022    Follow-up examination 04/04/2023    GERD (gastroesophageal reflux disease)     Hyperlipidemia     Hypertension     Obesity     Stroke (HCC)     TIA     Stroke (HCC)     TIA     Transaminitis 09/22/2021    Vasomotor rhinitis 8/9/2018    Refilled flonase today. Stopped taking since January 2022     Past Surgical History:   Procedure Laterality Date    BREAST BIOPSY Right 11/23/2018    us guided bx cancer    BREAST SURGERY      COLONOSCOPY      ESOPHAGOSCOPY N/A 07/20/2023    Procedure: ESOPHAGOSCOPY;  Surgeon: David Chapa MD;  [facility];  Service: ENT    HEMICOLOECTOMY W/ ANASTOMOSIS Right 3/12/2024    Procedure: RIGHT COLECTOMY WITH ROBOTICS;  Surgeon: Vickie Israel MD;  [facility]: [facility];  Service: Surgical Oncology    IR BIOPSY LIVER MASS  06/27/2023    IR CRYOABLATION  6/3/2024    IR GASTROSTOMY TUBE PLACEMENT  10/2/2024    IR PORT CHECK  01/03/2024    IR PORT PLACEMENT  07/28/2023    IR PORT STRIPPING  01/09/2024    LAPAROTOMY N/A 3/12/2024    Procedure: LAPAROTOMY EXPLORATORY W/ ROBOTICS;  Surgeon: Vickie Israel MD;  [facility];  Service: Surgical Oncology    AL BIOPSY NASOPHARYNX VISIBLE LESION SIMPLE N/A 10/9/2024    Procedure: NASOPHARYNGOSCOPY with biospy;  Surgeon: Juanito Matthew MD;  [facility];  Service: ENT    AL BRNCHSC INCL FLUOR GDNCE DX W/CELL WASHG SPX N/A 07/20/2023    Procedure: BRONCHOSCOPY;  Surgeon: David Chapa MD;  [facility]: [facility];  Service: ENT    AR LARYNGOSCOPY W/BIOPSY MICROSCOPE/TELESCOPE N/A 2023    Procedure: MICRODIRECT LARYNGOSCOPY WITH BIOPSY;  Surgeon: David Chapa MD;  [facility];  Service: ENT    AR LARYNGOSCOPY W/WO TRACHEOSCOPY DX EXCEPT  N/A 10/9/2024    Procedure: DIRECT LARYNGOSCOPY;  Surgeon: Juanito Matthew MD;  [facility];  Service: ENT    AR MASTECTOMY PARTIAL W/AXILLARY LYMPHADENECTOMY Right 2018    Procedure: ULTRASOUND LOCALIZED PARTIAL MASTECTOMY W/SENTINEL NODE BIOPSY POSS AXILLARY DISSECTION;  Surgeon: Zahida Coronado MD;  Location: [facility];  Service: General    AR TONSILLECTOMY PRIMARY/SECONDARY AGE 12/> N/A 10/9/2024    Procedure: TONSILLECTOMY;  Surgeon: Juanito Matthew MD;  [facility];  Service: ENT    TUBAL LIGATION      US GUIDED BREAST BIOPSY RIGHT COMPLETE Right 2018    US GUIDED INJECTION FOR RESEARCH STUDY  2018    US GUIDED INJECTION FOR RESEARCH STUDY  2018    US GUIDED INJECTION FOR RESEARCH STUDY  2019    US GUIDED LYMPH NODE BIOPSY RIGHT  2023       Review of Medications:   Vitamins, Supplements and Herbals: No, pt denies taking supplements    Current Outpatient Medications:     acetaminophen (TYLENOL) 160 mg/5 mL liquid, Take 20 mL (640 mg total) by mouth every 6 (six) hours as needed for mild pain for up to 10 days, Disp: 473 mL, Rfl: 3    anastrozole (ARIMIDEX) 1 mg tablet, Take 1 tablet (1 mg total) by mouth daily, Disp: 90 tablet, Rfl: 0    atorvastatin (LIPITOR) 40 mg tablet, Take 1 tablet (40 mg total) by mouth daily at bedtime, Disp: 30 tablet, Rfl: 2    cholestyramine (QUESTRAN) 4 g packet, Take 1 packet (4 g total) by mouth 3 (three) times a day with meals, Disp: 90 packet, Rfl: 3    diphenhydramine, lidocaine, Al/Mg hydroxide, simethicone (Magic Mouthwash) SUSP, Swish and swallow 10 mL every 4 (four) hours as needed for mouth pain or discomfort (Patient not taking: Reported on  1/31/2025), Disp: 480 mL, Rfl: 2    docusate sodium (COLACE) 50 mg capsule, Take 1 capsule (50 mg total) by mouth 2 (two) times a day, Disp: 90 capsule, Rfl: 1    ergocalciferol (VITAMIN D2) 50,000 units, Take 1 capsule (50,000 Units total) by mouth once a week, Disp: 90 capsule, Rfl: 0    fluorouracil 4,390 mg in CADD/Elastomeric Infusion Device, Infuse 4,390 mg (1,200 mg/m2/day x 1.83 m2) into a catheter in a vein via infusion device over 46 hours for 2 days  Infusion planned for April 2, 2025., Disp: 1 each, Rfl: 0    folic acid (FOLVITE) 1 mg tablet, Take 1 tablet (1 mg total) by mouth in the morning, Disp: 90 tablet, Rfl: 3    gabapentin (NEURONTIN) 300 mg capsule, Take 1 pills in the morning, 1 pill at lunch and 2 pills before bed, Disp: 120 capsule, Rfl: 0    hydrocortisone 1 % ointment, , Disp: , Rfl:     ibuprofen (MOTRIN) 100 mg/5 mL suspension, Take 20 mL (400 mg total) by mouth every 6 (six) hours as needed for moderate pain (Patient not taking: Reported on 1/31/2025), Disp: 473 mL, Rfl: 0    lidocaine-prilocaine (EMLA) cream, Apply topically as needed for mild pain (Patient not taking: Reported on 12/6/2024), Disp: 30 g, Rfl: 3    losartan (COZAAR) 50 mg tablet, Take 1 tablet (50 mg total) by mouth daily, Disp: 90 tablet, Rfl: 2    magnesium Oxide (MAG-OX) 400 mg TABS, 1 tablet (400 mg total) by Per G Tube route 2 (two) times a day (Patient not taking: Reported on 1/31/2025), Disp: 60 tablet, Rfl: 0    mirtazapine (REMERON) 15 mg tablet, Take 1 tablet (15 mg total) by mouth daily at bedtime Patient is also on a 30 mg tablet, for a total dose of 45 mg, Disp: 30 tablet, Rfl: 0    mirtazapine (REMERON) 30 mg tablet, Take 1 tablet (30 mg total) by mouth daily at bedtime, Disp: 30 tablet, Rfl: 0    nystatin (MYCOSTATIN) 500,000 units/5 mL suspension, Apply 5 mL (500,000 Units total) to the mouth or throat 4 (four) times a day, Disp: 600 mL, Rfl: 3    omeprazole (PriLOSEC) 20 mg delayed release capsule, Take  1 capsule (20 mg total) by mouth daily, Disp: 30 capsule, Rfl: 3    ondansetron (ZOFRAN) 8 mg tablet, Take 1 tablet (8 mg total) by mouth every 8 (eight) hours as needed for nausea or vomiting, Disp: 90 tablet, Rfl: 2    oxyCODONE (ROXICODONE) 5 mg/5 mL solution, Take 5 mL (5 mg total) by mouth every 6 (six) hours as needed for severe pain ((breakthrough pain)) Max Daily Amount: 20 mg, Disp: 473 mL, Rfl: 0    potassium chloride (Klor-Con M20) 20 mEq tablet, Take 1 tablet (20 mEq total) by mouth 2 (two) times a day (Patient not taking: Reported on 1/31/2025), Disp: 90 tablet, Rfl: 1    potassium chloride 10% oral solution, 15 mL (20 mEq total) by Per G Tube route 2 (two) times a day, Disp: 473 mL, Rfl: 0    prochlorperazine (COMPAZINE) 10 mg tablet, Take 1 tablet (10 mg total) by mouth every 6 (six) hours as needed for nausea or vomiting, Disp: 90 tablet, Rfl: 2    pyridoxine (VITAMIN B6) 50 mg tablet, Take 1 tablet (50 mg total) by mouth daily, Disp: 90 tablet, Rfl: 3    vitamin B-12 (VITAMIN B-12) 1,000 mcg tablet, , Disp: , Rfl:   No current facility-administered medications for this visit.    Facility-Administered Medications Ordered in Other Visits:     alteplase (CATHFLO) injection 2 mg, 2 mg, Intracatheter, Q1MIN PRN, Harika Agostino, DO    alteplase (CATHFLO) injection 2 mg, 2 mg, Intracatheter, Q1MIN PRN, Harika Agostino, DO    Atropine Sulfate injection 0.25 mg, 0.25 mg, Intravenous, Once, Harika Agostino, DO    Atropine Sulfate injection 0.25 mg, 0.25 mg, Intravenous, Once PRN, Harika Agostino, DO    irinotecan (CAMPTOSAR) 160 mg in sodium chloride 0.9 % 500 mL chemo infusion, 160 mg, Intravenous, Once, Harika Agostino, DO    magnesium sulfate 2 g in sodium chloride 0.9 % 1,000 mL IV, , Intravenous, Once PRN, Harika Agostino, DO, Last Rate: 1,000 mL/hr at 04/02/25 1153, New Bag at 04/02/25 1153    ondansetron (ZOFRAN) 16 mg, dexamethasone (DECADRON) 10 mg in sodium chloride 0.9 % 50 mL IVPB, , Intravenous,  "Once, Harika Medina, DO    Most Recent Lab Results:   Lab Results   Component Value Date    WBC 8.45 03/31/2025    NEUTROABS 6.37 03/31/2025    IRON 21 (L) 02/28/2025    TIBC 170.8 (L) 02/28/2025    FERRITIN 712 (H) 02/28/2025    CHOLESTEROL 167 04/24/2024    TRIG 137 04/24/2024    HDL 43 (L) 04/24/2024    LDLCALC 97 04/24/2024    ALT 9 03/31/2025    AST 23 03/31/2025    ALB 3.5 03/31/2025    SODIUM 133 (L) 03/31/2025    SODIUM 135 03/28/2025    K 4.0 03/31/2025    K 4.1 03/28/2025    CL 99 03/31/2025    BUN 25 03/31/2025    BUN 26 (H) 03/28/2025    CREATININE 0.98 03/31/2025    CREATININE 0.99 03/28/2025    EGFR 59 03/31/2025    PHOS 2.8 11/01/2024    PHOS 3.3 10/30/2024    TSH 1.58 01/03/2022    GLUCOSE 209 (H) 03/12/2024    POCGLU 89 12/27/2024    GLUF 102 (H) 10/30/2024    GLUF 95 09/13/2024    GLUC 100 03/31/2025    HGBA1C 5.5 02/21/2024    HGBA1C 5.9 (H) 06/13/2023    HGBA1C 6.1 (H) 07/09/2022    CALCIUM 9.5 03/31/2025    FOLATE 14.1 02/28/2025    MG 1.9 03/31/2025       Anthropometric Measurements:   Height: 66\"  Ht Readings from Last 1 Encounters:   04/02/25 5' 5.98\" (1.676 m)     Wt Readings from Last 20 Encounters:   04/02/25 72.6 kg (160 lb 0.9 oz)   04/01/25 72.6 kg (160 lb)   03/19/25 70.8 kg (156 lb 1.4 oz)   03/18/25 71.2 kg (157 lb)   03/05/25 73.1 kg (161 lb 2.5 oz)   02/28/25 72.6 kg (160 lb)   02/18/25 73 kg (161 lb)   02/04/25 73.4 kg (161 lb 12.8 oz)   01/31/25 75.1 kg (165 lb 9.6 oz)   01/30/25 75.6 kg (166 lb 10.7 oz)   01/22/25 75.6 kg (166 lb 10.7 oz)   01/10/25 77.1 kg (170 lb)   01/08/25 76.5 kg (168 lb 10.4 oz)   12/31/24 82.6 kg (182 lb)   12/20/24 82.1 kg (181 lb)   12/19/24 84.4 kg (186 lb)   12/06/24 84.4 kg (186 lb)   11/26/24 82.2 kg (181 lb 4 oz)   10/29/24 86.2 kg (190 lb)   10/24/24 86.6 kg (190 lb 14.7 oz)       Weight History:   Usual Weight: 275#  Varian: (2/22/19) 264.6#, (4/2/19) 263.4, (9/16/24) 197.4#, (9/23/24) 194#, (9/30/24) 192.8#, (10/7/24) 190.2#, (10/11/24) " 194.6#, (10/14/24) 191#, (10/17/24) 192.8#, (10/21/24) 190#, (10/24/24) 190#, (10/28/24) 189#, (11/11/24) 185#  Home Scale: n/a    Oncology Nutrition-Anthropometrics      Flowsheet Row Nutrition from 4/2/2025 in [facility] Nutrition from 3/19/2025 in [facility]   Patient [age] (years): 70 years 70 years   Patient (female) height (in): 66 in 66 in   Current Weight to be used for anthropometric calculations (lbs) 160.9 lbs 156.1 lbs   Current Weight to be used for anthropometric calculations (kg) 73.1 kg 71 kg   BMI: 26 25.2   IBW female: 130 lbs 130 lbs   IBW (kg) female: 59.1 kg 59.1 kg   IBW % (female) 123.8 % 120.1 %   Adjusted BW (female): 137.7 lbs 136.5 lbs   Adjusted BW kg (female): 62.6 kg 62 kg   % weight change after 1 month: -0.2 % -3 %   Weight change after 1 month (lbs) -0.3 lbs -4.9 lbs   % weight change after 3 months: -4.6 % -16.1 %   Weight change after 3 months (lbs) -7.8 lbs -29.9 lbs   % weight change after 6 months: -- -19.9 %   Weight change after 6 months (lbs) -- -38.9 lbs            Nutrition-Focused Physical Findings: none observed    Food/Nutrition-Related History & Client/Social History:    Current Nutrition Impact Symptoms:  [] Nausea  [x] Reduced Appetite  [] Acid Reflux    [] Vomiting  [x] Unintended Wt Loss -stable now [] Malabsorption    [] Diarrhea -resolved [] Unintended Wt Gain  [] Dumping Syndrome    [] Constipation -BM every other day [] Thick Mucous/Secretions  [] Abdominal Pain    [] Dysgeusia (Altered Taste)  [x] Xerostomia (Dry Mouth)  [] Gas    [] Dysosmia (Altered Smell)  [] Thrush  [] Difficulty Chewing    [] Oral Mucositis (Sore Mouth)  [x] Fatigue  [] Hyperglycemia   Lab Results   Component Value Date    HGBA1C 5.5 02/21/2024      [x] Odynophagia -improving [] Esophagitis  [] Other:    [x] Dysphagia -enteral nutrition as 1' source of nutrition  Follows with SLP  Advanced to mechanical diet []  Early Satiety  [] No Problems Eating      Food Allergies & Intolerances: no    Current Diet: Tube Feeding. Mechanical diet, small amounts of PO  Current Nutrition Intake: Increased since last visit  Appetite: Fair   Nutrition Route: PO and PEG  Oral Care: brushes daily  Activity level: ADLs.     24 Hr Diet Recall:   Breakfast: oatmeal with butter  Lunch: nothing  Dinner: hamburger meat, mac and cheese and small amount of broccoli     Beverages: water (sips throughout the day)  Supplements:   Ensure Complete (10 oz, 350 kcal, 30 g pro) -2 daily, usually puts through tube    EN Recall:  DME: [employer]  Access Type: PEG  Formula: Shayy Farms Peptide 1.5  Method: Bolus  Regimen: 11oz (325 mL) 2  times per day of [employer] 1.5 via PEG (gravity syringe method to administer boluses; 1 container infusing over ~15-20 minutes).  Flush: 60 mL free water flush  pre/post each bolus (120 mL x 2 boluses = 240 mL)  EN providin mL volume (2 cartons/day), 1000 kcal (43% est needs), 48g protein (52% est needs), 456 mL free water, 696 mL total water (30% est needs).  Pt also flushing 1-2 syringes every few hours for added hydration. Pt gets IVF 3x/week.       Oncology Nutrition-Estimated Needs      Flowsheet Row Nutrition from 2025 in [facility] Nutrition from 2024 in [facility]   Weight type used Actual weight Adjusted weight   Weight in kilograms (kg) used for estimated needs 77.3 kg 66.4 kg   Energy needs formula:  30-35 kcal/kg 30-35 kcal/kg   Energy needs based on 30 kcal/k kcal  kcal   Energy needs based on 35 kcal/k kcal 2323 kcal   Protein needs formula: 1.2-1.5 g/kg 1.2-1.5 g/kg   Protein needs based on 1.2 g/k g 80 g   Protein needs based on 1.5 g/kg 116 g 100 g   Fluid needs formula: 30-35 mL/kg 30-35 mL/kg   Fluid needs based on 30 mL/kg 2319 mL  mL   Fluid needs in ounces 78 oz 67 oz   Fluid needs  based on 35 mL/kg 2706 mL 2324 mL   Fluid needs in ounces 91 oz 79 oz             Discussion & Intervention:   Maira was evaluated today for an RD follow up regarding HNC and enteral nutrition.  Maira has completed tx for HNC and is currently undergoing tx for metastatic colon cancer .  Met with Maira in the infusion center today. She continues to work with SLP- she's cleared for mechanical foods and thin liquids. She feels she's been able to eat more orally lately. She is consistently eating 2 small meals. She uses her feeding tube for most of her nutrition- 2 cartons of Marcela Farms, and 2 bottles of Ensure complete. I did encouraged her to try drinking at least 1 of the Ensure shakes if she's feeling up to it. Her weight is maintaining at this time. She has not been able to order more Marcela Farms as she's saving up for her copay. We discussed continuing her current regimen with marcela Farms and Ensure as she's maintaining weight on this.    Reviewed 24 hour recall, which revealed an adequate enteral intake and PO intake, and discussed ways to optimize nutrient intake.  Also reviewed the importance of wt management throughout the tx process and the role of enteral nutrition in managing wt and overall health.  Based on today's assessment, discussion included: MNT for:   fluid, soft/mechanical diet,  enteral nutrition, practicing proper oral care, adequate hydration & fluid choices, utilizing oral nutrition supplements, practicing proper feeding tube use & care, adherence to and compliance with enteral nutrition plan of care, and individualized enteral nutrition recommendations & plan:   .   Moving forward, Maira was encouraged to continue TFl  and continue increasing oral intake   Materials Provided: Ensure samples and Ensure Coupons  All questions and concerns addressed during today’s visit.  Maira has RD contact information.    Nutrition Diagnosis:   Increased Nutrient Needs (kcal & pro) related to  increased demand for nutrients and disease state as evidenced by cancer dx and pt undergoing tx for cancer.  Swallowing Difficulty related to diagnosis/treatment related causes as evidenced by  need for feeding tube, diet restriction per SLP.  Monitoring & Evaluation:   Goals:  weight maintenance/stabilization  adequate nutrition impact symptom management  pt to meet >/=75% estimated nutrition needs daily  achieve tube feeding goal rate    Progress Towards Goals: Progressing    Nutrition Rx & Recommendations:  Diet: enteral nutrition as 1' source of nutrition  Stay hydrated by sipping fluids of choice/tolerance throughout the day.    Follow proper oral care; Try baking soda/salt water rinse recipe (mix 3/4 tsp salt + 1 tsp baking soda + 1 qt water; rinse with plain water after using) in Eating Hints book (pg 18).  Brush your teeth before/after meals & before bed.  Weigh yourself regularly. If you notice weight loss, make an effort to increase your daily food/calorie intake. If you continue to notice loss after these efforts, reach out to your dietitian to establish a plan to stabilize weight.   Continue with Ensure Plus/Complete 2 times daily. Can try drinking orally, or use through PEG if tolerated better  Choose liquids with calories: whole milk, Fairlife milk (higher protein/lactose-free milk), chocolate milk, drinkable yogurt, kefir, 100% fruit juice, diluted juice, bone broth (higher protein broth), creamy soups, sports drinks (Gatorade, Poweraide, Pedialyte, etc.), Italian ice, popsicles, milkshakes, smoothies, oral nutrition supplements (Ensure, Boost, etc.), gelatin/Jello, etc.  Soft/easy to swallow foods that are high in calories and protein: casseroles, chicken/egg/tuna salad with extra garcia to add calories and moisture, oatmeal/cream of wheat made with whole milk, cottage cheese, whole milk Greek yogurt, scrambled eggs with cheese, avocado, macaroni and cheese, mashed potatoes made with butter and whole  milk or dry milk powder, ground meat with extra sauce/gravy, canned fruit, peanut butter, cream soups (cream of chicken, cream of mushroom, broccoli cheddar), pudding made with whole milk, custard, ice cream, banana, milkshakes/smoothies, Review page 47 of Eating Hints Book for more ideas.   TF plan -   TF Goal:  Continue [employer] 1.5, at lease 2 cartons/day  Contact RD for substitutions  Call [facility] when you have 10 days left of TF supplies: [phone], option 3 (customer support).    Follow Up Plan:  during infusion on 4/16/25    Recommend Referral to Other Providers: none at this time

## 2025-04-03 ENCOUNTER — OFFICE VISIT (OUTPATIENT)
Dept: SPEECH THERAPY | Facility: CLINIC | Age: 70
End: 2025-04-03
Payer: MEDICARE

## 2025-04-03 DIAGNOSIS — Z90.89 STATUS POST TONSILLECTOMY: ICD-10-CM

## 2025-04-03 DIAGNOSIS — R13.12 DYSPHAGIA, OROPHARYNGEAL PHASE: Primary | ICD-10-CM

## 2025-04-03 DIAGNOSIS — C09.9 RIGHT TONSILLAR SQUAMOUS CELL CARCINOMA (HCC): ICD-10-CM

## 2025-04-03 PROCEDURE — 92526 ORAL FUNCTION THERAPY: CPT | Performed by: SPEECH-LANGUAGE PATHOLOGIST

## 2025-04-03 NOTE — ASSESSMENT & PLAN NOTE
No evidence of progression of this on exam.  We are planning on a CT scan after 6 total cycles of FOLFIRI.

## 2025-04-03 NOTE — PROGRESS NOTES
Daily Speech Treatment Note    Today's date: 4/3/2025   Patient’s name: Maira oMura  : 1955  MRN: [id number]  Safety measures: HTN, CVA, GERD, active CA, s/p PEG tube placement, aspiration precautions  Current recommended diet: PEG tube for primary nutrition/hydration; puree with thin liquids P.O. as tolerated   Referring provider: Harika Medina DO Encounter Diagnosis     ICD-10-CM    1. Dysphagia, oropharyngeal phase  R13.12       2. Status post tonsillectomy  Z90.89       3. 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    -Patient reported that she forgot her egg salad at home today. Patient stated that her stomach was upset--patient indicated that she forgot to take her anti-nausea medication. Patient was willing to trial some foods P.O. (see below), but requested to stop when she felt full.     Objective/Assessment:    Short-term goals:  Patient will tolerate P.O. trials of her recommended diet with the implementation of safe swallowing strategies without overt s/sx of penetration and/or aspiration during skilled therapy sessions in this certification period (to be achieved in 4-6 weeks). -- PARTIALLY MET    Traditional VitalStim     Channel(s) used: 1, 2   Placement: 3a   Duty Cycle: 57/1 s   Frequency: 80 pulses per second   Phase Duration: 300 microseconds   Treatment Duration: 35 minutes   Min mA level: 7.5 mA   Max mA level: 11.0 mA      Patient tolerated NMES in conjunction with pharyngeal/laryngeal strengthening exercises and P.O. intake. (The patient received instruction on the potential benefits from NMES treatment,  including neuromuscular re-education and muscle strengthening.) Skin condition post-NMES: intact.      Patient consumed the following during today’s session: 1.5 Cecille Doone cookies and 1/2 applesauce with water (thin liquid) via side of cup -- NMES remained on during pharyngeal strengthening and IOPI exercises (see below).     Patient without any c/o odynophagia again today. Oral containment and bolus formation/control, AP transfer, and swallow initiation were judged to be WFL.  Reduced hyolaryngeal elevation and excursion noted upon palpation. No oral cavity residue was observed. Patient with no c/o globus sensation on macaroni noodles. Cough x2 noted on thin liquid during session trials. Vocal quality remained at baseline status. No other overt s/sx of penetration/aspiration noted during today's session.    Patient will perform oral motor exercises using IOPI device on lingual muscles to facilitate increased function and strength by 25% for improved swallowing function (to be achieved in 6-8 weeks). -- PARTIALLY MET    IOPI Pro Pmax Data Tracker Grid (from [id number]):       01/09/25 01/14/25 01/23/25 03/20/25 03/25/25 03/27/25 04/03/25   Tongue tip (GOAL = 56) 50 49 49 70  58  61  63   Tongue back (GOAL = 50) 47 54 61 69  62 64 69       IOPI Pro -- Today's Exercise Targets:       Pmax (after 3 trials): Effort level: Target for today's treatment:   Tongue tip  (GOAL = 56) 63 85%    54   Tongue back  (GOAL = 50) 69 85%    59         IOPI Pro -- Exercise Tracker Grid (from [id number]):       01/09/25 01/14/25 01/23/25 03/20/25 03/25/25 03/27/25 04/03/25   Tongue tip 3 sets of 30 reps (pumps)    3 sets of 30 reps (pumps)     Endurance holds (50% of max) x 2; avg 20    3 sets of 30 reps (pumps)     1 sets of 3 reps (20.47 second holds) 4 sets of 30 reps (pumps)  4 sets of 30 reps (pumps) 2 sets of 30 reps (pumps) 2 sets of 30 reps (pumps)   Tongue back 3 sets of 30 reps (pumps)    3 sets of 30  reps (pumps)     Endurance holds (50% of max) x 2; avg 23 sec    3 sets of 30 reps (pumps)     1 sets of 3 reps (16.92 second holds) 4 sets of 30 reps (pumps)  4 sets of 30 reps (pumps) 2 sets of 30 reps (pumps) 2 sets of 30 reps (pumps)     Patient benefited from min verbal cues during IOPI exercises for proper execution.    Patient reported increased lingual sensitivity today (burning sensation in middle of tongue).       Patient will independently complete pharyngeal strengthening exercises (e.g., Effortful swallow, Mendelsohn, Evelia) daily to facilitate increased pharyngeal strength and coordination (to be achieved in 4-6 weeks). -- PARTIALLY MET  -Patient completed the following pharyngeal strengthening exercises during today's session:  *Effortful swallow: 1 set of 10 reps  *Mendelsohn: 1 set of 10 reps  *Evelia: 1 set of 10 reps    Plan:  -Patient was provided with home exercises/activities to target goals in plan of care at the end of today's session.  -Continue with current plan of care.

## 2025-04-03 NOTE — PROGRESS NOTES
Name: Maira Moura      : 1955      MRN: [id number]  Encounter Provider: Harika Medina DO  Encounter Date: 2025   Encounter department: [facility] [place]  :  Assessment & Plan  Metastatic colon cancer to liver (HCC)    Orders:    ondansetron (ZOFRAN) 8 mg tablet; Take 1 tablet (8 mg total) by mouth every 8 (eight) hours as needed for nausea or vomiting    prochlorperazine (COMPAZINE) 10 mg tablet; Take 1 tablet (10 mg total) by mouth every 6 (six) hours as needed for nausea or vomiting    Infusion Calculated Appointment Request; Future    CBC and differential; Future    Comprehensive metabolic panel; Future    Infusion Calculated Appointment Request; Future    We will proceed with cycle 4 of FOLFIRI without any dose adjustments.  She is tolerating it remarkably well and is finally doing better clinically.  We will continue with electrolyte support.  I gave her Ensure samples today and she will continue to supplement with tube feeds.  I refilled her Zofran for her as well.  I will continue to see her every 2 weeks for close monitoring.  I am thrilled to see how well she is doing.  Right tonsillar squamous cell carcinoma (HCC)       No evidence of progression of this on exam.  We are planning on a CT scan after 6 total cycles of FOLFIRI.      Return in 2 weeks (on 4/15/2025) for 20 minute follow up visit.    History of Present Illness   Chief Complaint   Patient presents with    Follow-up     70-year-old female with metastatic colon cancer and at least locally advanced head neck cancer.  She has received 3 cycles of FOLFIRI and has done well.  Her energy is actually improved and her fatigue is better.  Her swallowing is slowly improving.  She was able to eat some chopped hamburger and noodles.  She is still supplementing with her PEG tube.  She has not had any nausea or vomiting that is significant.  She takes 1-2 Zofran a day the minute she starts to sense that  it is happening.  She is needing less oxycodone and her throat pain is resolving.  For the first time since I have cared for her she has gained weight.  She put on 3 pounds since my last visit.  She denies any fevers or infections.  No diarrhea or constipation.    Oncology History   Cancer Staging   Malignant neoplasm of right female breast (HCC)  Staging form: Breast, AJCC 8th Edition  - Pathologic: Stage IA (pT2, pN0, cM0, G2, ER: Positive, OR: Positive, HER2: Negative) - Signed by Rosalva Drake MD on 1/11/2019  Histologic grading system: 3 grade system    Metastatic colon cancer to liver (HCC)  Staging form: Colon and Rectum, AJCC 8th Edition  - Clinical stage from 7/13/2023: cT3, cN0, pM1 - Signed by Harika Medina DO on 9/28/2023  Total positive nodes: 0  - Pathologic: pT3, pN0, cM1 - Signed by Vickie Israel MD on 4/3/2024  Total positive nodes: 0    Right tonsillar squamous cell carcinoma (HCC)  Staging form: Pharynx - Oropharynx, AJCC 8th Edition  - Clinical stage from 7/27/2023: Stage III (cT1, cN3, cM0, p16+) - Signed by Evan Plummer MD on 7/27/2023  Stage prefix: Initial diagnosis  Oncology History Overview Note   2/2019 - pT2N0 breast cancer treated with anastrozole, oncotype 13, ER+ OR+ Her2 neg     7/2023 - metastatic ascending colon adenocarcinoma to liver     Caris - KRAS G12D, CAIN, PD-L1 0%     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, unresectable     7/31/2023 - FOLFOX     11/20/2023 - opdivo added to FOLFOX     12/18/2023-cycle 11-20% dose reduction of 5-FU bolus and oxaliplatin due to increased fatigue     Jan 2024 - oxaliplatin removed from treatment plan due to neuropathy - PET scan shows good disease control in all areas except colon lesion     3/2024 - right hemicolectomy - pT3N0     6/3/24 - cryoablation to liver (no interruption to systemic therapy)     7/30/2024 - stop folfox     9/2024 - 10/2024 - cis/RT to tonsils and cervical Lns    12/2024 - disease  "progression of colon cancer in the liver    01/2025 - Initiated FOLFIRI every 14-days, with 50% dose-reduction of Irinotecan due to pre-existing diarrhea.    February 2025: systemic chemotherapy held due to patient experiencing prolonged side effects of diarrhea and neutropenia.     March 2025: resume C3 of FOLFIRI      Malignant neoplasm of right female breast (HCC)   11/23/2018 Initial Diagnosis    Malignant neoplasm of right female breast (HCC)     11/23/2018 Biopsy    Rt Breast US BX 1100 8cmfn, 4 passes 12g Marquee:  - Invasive breast carcinoma of no special type (ductal NST/invasive ductal carcinoma).  - grade 2  - %  - TX 95%  - HER2 negative     12/20/2018 Surgery    Right partial mastectomy and SLN biopsy:  Infiltrating ductal carcinoma, Grade 2/3, felt to be between 2.0 cm and 2.5 cm in greatest dimension  - No invasive tumor is seen at inked margins, but there is a focus of DCIS seen within approximately 1mm of the inked medial margin  - no LVI  - no LCIS  - 0/2 LN's  at least Stage IIA - pT2, pN0, cM0, G2.    - Dr Coronado     12/20/2018 -  Cancer Staged    Stage IIA - pT2, pN0, cM0, G2.       1/4/2019 Genomic Testing    Oncotype DX score: 13     2/1/2019 -  Hormone Therapy    anastrozole 1 mg daily as adjuvant endocrine therapy    - Dr Daley       2/14/2019 - 4/3/2019 Radiation    Course: C1    Plan ID Energy Fractions Dose per Fraction (cGy) Dose Correction (cGy) Total Dose Delivered (cGy) Elapsed Days   R Breast 10X/6X 25 / 25 200 0 5,000 40   R BRST BOOST 16E 5 / 5 200 0 1,000 7      Treatment dates:  C1: 2/14/2019 - 4/3/2019       Metastatic colon cancer to liver (HCC)   7/6/2023 Genetic Testing    CARIS Results:   KRAS Pathogenic Variant Exon 2  p.G12D : lack of benefit of cetuximab, panitumumab Level 2   No other actionable mutation; MSI stable; TMB low   MiFOLOXAi Results: \"Decreased Benefit of FOLFOX + Bevacizumab in first line metastatic CRC.  This patient may achieve improved results " "by receiving an alternative to FOLFOX, such as FOLFIRI, as their initial regimen. As an adjustment to frontline FOLFOXIRI following toxicity: This patient may achieve improved results by removing the oxaliplatin portion of their regimen\".         7/11/2023 Initial Diagnosis    Metastatic colon cancer to liver (HCC)     7/13/2023 -  Cancer Staged    Staging form: Colon and Rectum, AJCC 8th Edition  - Clinical stage from 7/13/2023: cT3, cN0, pM1 - Signed by Harika Medina DO on 9/28/2023  Total positive nodes: 0       7/31/2023 - 8/1/2024 Chemotherapy    cyanocobalamin, 1,000 mcg, Intramuscular, Every 30 days, 7 of 9 cycles  Administration: 1,000 mcg (5/9/2024), 1,000 mcg (5/24/2024), 1,000 mcg (6/20/2024), 1,000 mcg (7/3/2024), 1,000 mcg (7/18/2024), 1,000 mcg (8/1/2024)  potassium chloride, 20 mEq, Intravenous, Once, 2 of 2 cycles  Administration: 20 mEq (11/6/2023), 20 mEq (11/20/2023)  alteplase (CATHFLO), 2 mg, Intracatheter, Every 1 Minute as needed, 21 of 23 cycles  Administration: 2 mg (1/3/2024)  pegfilgrastim (NEULASTA), 6 mg, Subcutaneous, Once, 12 of 12 cycles  Administration: 6 mg (10/25/2023), 6 mg (11/8/2023), 6 mg (11/22/2023), 6 mg (12/6/2023), 6 mg (12/20/2023), 6 mg (1/12/2024), 6 mg (1/25/2024), 6 mg (4/25/2024), 6 mg (5/9/2024), 6 mg (5/24/2024), 6 mg (6/20/2024)  fluorouracil (ADRUCIL), 895 mg, Intravenous, Once, 21 of 23 cycles  Dose modification: 320 mg/m2 (original dose 400 mg/m2, Cycle 11)  Administration: 900 mg (7/31/2023), 900 mg (8/14/2023), 900 mg (8/28/2023), 900 mg (9/11/2023), 900 mg (9/25/2023), 900 mg (10/9/2023), 900 mg (10/23/2023), 895 mg (11/6/2023), 895 mg (11/20/2023), 895 mg (12/4/2023), 700 mg (12/18/2023), 680 mg (1/10/2024), 680 mg (1/23/2024), 625 mg (4/23/2024), 625 mg (5/7/2024), 625 mg (5/22/2024), 625 mg (6/4/2024), 625 mg (6/18/2024), 625 mg (7/1/2024), 625 mg (7/16/2024), 625 mg (7/30/2024)  nivolumab (OPDIVO) IVPB, 240 mg (100 % of original dose 240 mg), " Intravenous, Once, 13 of 15 cycles  Dose modification: 240 mg (original dose 240 mg, Cycle 9)  Administration: 240 mg (11/20/2023), 240 mg (12/4/2023), 240 mg (12/18/2023), 240 mg (1/10/2024), 240 mg (1/23/2024), 240 mg (4/23/2024), 240 mg (5/7/2024), 240 mg (5/22/2024), 240 mg (6/4/2024), 240 mg (6/18/2024), 240 mg (7/1/2024), 240 mg (7/16/2024), 240 mg (7/30/2024)  leucovorin calcium IVPB, 896 mg, Intravenous, Once, 21 of 23 cycles  Administration: 900 mg (7/31/2023), 900 mg (8/14/2023), 900 mg (8/28/2023), 900 mg (9/11/2023), 900 mg (9/25/2023), 900 mg (10/9/2023), 900 mg (10/23/2023), 900 mg (11/6/2023), 900 mg (11/20/2023), 900 mg (12/4/2023), 900 mg (12/18/2023), 850 mg (1/10/2024), 850 mg (1/23/2024), 800 mg (4/23/2024), 800 mg (5/7/2024), 800 mg (5/22/2024), 800 mg (6/4/2024), 800 mg (6/18/2024), 800 mg (7/1/2024), 800 mg (7/16/2024), 800 mg (7/30/2024)  oxaliplatin (ELOXATIN) chemo infusion, 85 mg/m2 = 190.4 mg, Intravenous, Once, 12 of 12 cycles  Dose modification: 68 mg/m2 (original dose 85 mg/m2, Cycle 11, Reason: Other (Must fill in a comment), Comment: increased fatigue)  Administration: 190.4 mg (7/31/2023), 190.4 mg (8/14/2023), 200 mg (8/28/2023), 200 mg (9/11/2023), 200 mg (9/25/2023), 200 mg (10/9/2023), 200 mg (10/23/2023), 200 mg (11/6/2023), 200 mg (11/20/2023), 190.4 mg (12/4/2023), 150 mg (12/18/2023), 150 mg (1/10/2024)  fluorouracil (ADRUCIL) ambulatory infusion Soln, 1,200 mg/m2/day = 5,375 mg, Intravenous, Over 46 hours, 21 of 23 cycles     9/26/2023 -  Cancer Staged    Staging form: Colon and Rectum, AJCC 8th Edition  - Pathologic: pT3, pN0, cM1 - Signed by Vickie Israel MD on 4/3/2024  Total positive nodes: 0       3/12/2024 Surgery    A. Terminal ileum, appendix, right colon (right hemicolectomy):  - Adenocarcinoma (5.2cm)  - Forty-nine (49) lymph nodes negative for carcinoma (0/49)    - Acellular mucin present in one (1) lymph node   - See staging synoptic (ypT3N0)     1/8/2025 -   Chemotherapy    fluorouracil (ADRUCIL), 400 mg/m2 = 770 mg, 2 of 2 cycles  Administration: 770 mg (1/8/2025), 770 mg (1/22/2025)  irinotecan (CAMPTOSAR) chemo infusion, 173 mg (50 % of original dose 180 mg/m2), 5 of 8 cycles  Dose modification: 90 mg/m2 (original dose 180 mg/m2, Cycle 1, Reason: Anticipated Tolerance, Comment: 50% dose reduction due to pre-existing diarrhea)  Administration: 180 mg (1/8/2025), 180 mg (1/22/2025), 160 mg (3/5/2025), 160 mg (3/19/2025), 160 mg (4/2/2025)  leucovorin calcium IVPB, 768 mg, 2 of 2 cycles  Administration: 800 mg (1/8/2025), 800 mg (1/22/2025)  fluorouracil (ADRUCIL) ambulatory infusion Soln, 1,200 mg/m2/day = 4,610 mg, 5 of 8 cycles     Right tonsillar squamous cell carcinoma (HCC)   7/27/2023 Initial Diagnosis    Right tonsillar squamous cell carcinoma (HCC)     7/27/2023 -  Cancer Staged    Staging form: Pharynx - Oropharynx, AJCC 8th Edition  - Clinical stage from 7/27/2023: Stage III (cT1, cN3, cM0, p16+) - Signed by Evan Plummer MD on 7/27/2023  Stage prefix: Initial diagnosis       9/16/2024 - 11/13/2024 Radiation      Plan ID Energy Fractions Dose per Fraction (cGy) Dose Correction (cGy) Total Dose Delivered (cGy) Elapsed Days   Head_Neck 6X-FFF 35 / 35 200 0 7,000 58    C2: 9/16/2024 - 11/13/2024 9/17/2024 - 10/21/2024 Chemotherapy    alteplase (CATHFLO), 2 mg, Intracatheter, Every 1 Minute as needed, 6 of 6 cycles  Administration: 2 mg (10/21/2024)  CISplatin (PLATINOL) infusion, 70 mg (original dose 40 mg/m2), Intravenous, Once, 6 of 6 cycles  Dose modification: 70 mg (original dose 40 mg/m2, Cycle 1, Reason: Anticipated Tolerance), 30 mg/m2 (original dose 40 mg/m2, Cycle 4, Reason: Neuropathy, Comment: dose reduction to 30 mg/m2 due to neuropathy)  Administration: 70 mg (9/17/2024), 70 mg (9/23/2024), 70 mg (9/30/2024), 59.1 mg (10/7/2024), 59.1 mg (10/14/2024), 59.1 mg (10/21/2024)  sodium chloride, 500 mL, Intravenous, Once, 6 of 6  "cycles  Administration: 500 mL (9/17/2024), 500 mL (9/17/2024), 500 mL (9/23/2024), 500 mL (9/23/2024), 500 mL (9/30/2024), 500 mL (9/30/2024), 500 mL (10/7/2024), 500 mL (10/7/2024), 500 mL (10/14/2024), 500 mL (10/14/2024), 500 mL (10/21/2024)  fosaprepitant (EMEND) IVPB, 150 mg, Intravenous, Once, 6 of 6 cycles  Administration: 150 mg (9/17/2024), 150 mg (9/23/2024), 150 mg (9/30/2024), 150 mg (10/7/2024), 150 mg (10/14/2024), 150 mg (10/21/2024)  IVPB builder, , Intravenous, Once, 1 of 1 cycle  Administration: Unknown dose (10/21/2024)        Pertinent Medical History         Review of Systems otherwise neg        Objective   /70 (BP Location: Left arm, Patient Position: Sitting, Cuff Size: Adult)   Pulse 95   Temp 97.6 °F (36.4 °C) (Temporal)   Resp 16   Ht 5' 5.98\" (1.676 m)   Wt 72.6 kg (160 lb)   SpO2 97%   BMI 25.84 kg/m²     Pain Screening:  Pain Score: 0-No pain  ECOG   1  Physical Exam    GEN: Alert, awake oriented x3, in no acute distress  HEENT- No pallor, icterus, cyanosis, no oral mucosal lesions,   LAD - no palpable cervical, clavicle, axillary, inguinal LAD  Heart- normal S1 S2, regular rate and rhythm, No murmur, rubs.   Lungs- clear breathing sound bilateral.   Abdomen- soft, Non tender, bowel sounds present, PET in places with small amout of serous discharge  Extremities- No cyanosis, clubbing, edema  Neuro- No focal neurological deficit      Labs: I have reviewed the following labs:  Lab Results   Component Value Date/Time    WBC 7.31 04/02/2025 11:50 AM    RBC 2.95 (L) 04/02/2025 11:50 AM    Hemoglobin 9.0 (L) 04/02/2025 11:50 AM    Hematocrit 28.2 (L) 04/02/2025 11:50 AM    MCV 96 04/02/2025 11:50 AM    MCH 30.5 04/02/2025 11:50 AM    RDW 16.9 (H) 04/02/2025 11:50 AM    Platelets 272 04/02/2025 11:50 AM    Segmented % 77 (H) 04/02/2025 11:50 AM    Lymphocytes % 10 (L) 04/02/2025 11:50 AM    Monocytes % 10 04/02/2025 11:50 AM    Eosinophils Relative 2 04/02/2025 11:50 AM    " Basophils Relative 1 04/02/2025 11:50 AM    Immature Grans % 0 04/02/2025 11:50 AM    Absolute Neutrophils 5.62 04/02/2025 11:50 AM     Lab Results   Component Value Date/Time    Potassium 3.8 04/02/2025 11:50 AM    Chloride 100 04/02/2025 11:50 AM    CO2 30 04/02/2025 11:50 AM    BUN 22 04/02/2025 11:50 AM    Creatinine 1.04 04/02/2025 11:50 AM    Glucose, Fasting 102 (H) 10/30/2024 04:34 AM    Calcium 9.5 04/02/2025 11:50 AM    Corrected Calcium 10.1 04/02/2025 11:50 AM    AST 23 04/02/2025 11:50 AM    ALT 9 04/02/2025 11:50 AM    Alkaline Phosphatase 115 (H) 04/02/2025 11:50 AM    Total Protein 7.1 04/02/2025 11:50 AM    Albumin 3.3 (L) 04/02/2025 11:50 AM    Total Bilirubin 0.27 04/02/2025 11:50 AM    eGFR 54 04/02/2025 11:50 AM

## 2025-04-03 NOTE — ASSESSMENT & PLAN NOTE
Orders:    ondansetron (ZOFRAN) 8 mg tablet; Take 1 tablet (8 mg total) by mouth every 8 (eight) hours as needed for nausea or vomiting    prochlorperazine (COMPAZINE) 10 mg tablet; Take 1 tablet (10 mg total) by mouth every 6 (six) hours as needed for nausea or vomiting    Infusion Calculated Appointment Request; Future    CBC and differential; Future    Comprehensive metabolic panel; Future    Infusion Calculated Appointment Request; Future    We will proceed with cycle 4 of FOLFIRI without any dose adjustments.  She is tolerating it remarkably well and is finally doing better clinically.  We will continue with electrolyte support.  I gave her Ensure samples today and she will continue to supplement with tube feeds.  I refilled her Zofran for her as well.  I will continue to see her every 2 weeks for close monitoring.  I am thrilled to see how well she is doing.

## 2025-04-04 ENCOUNTER — HOSPITAL ENCOUNTER (OUTPATIENT)
Dept: INFUSION CENTER | Facility: CLINIC | Age: 70
End: 2025-04-04
Payer: MEDICARE

## 2025-04-04 DIAGNOSIS — C78.7 METASTATIC COLON CANCER TO LIVER (HCC): ICD-10-CM

## 2025-04-04 DIAGNOSIS — Z17.0 MALIGNANT NEOPLASM OF UPPER-OUTER QUADRANT OF RIGHT BREAST IN FEMALE, ESTROGEN RECEPTOR POSITIVE (HCC): Primary | ICD-10-CM

## 2025-04-04 DIAGNOSIS — C50.411 MALIGNANT NEOPLASM OF UPPER-OUTER QUADRANT OF RIGHT BREAST IN FEMALE, ESTROGEN RECEPTOR POSITIVE (HCC): Primary | ICD-10-CM

## 2025-04-04 DIAGNOSIS — C18.9 METASTATIC COLON CANCER TO LIVER (HCC): ICD-10-CM

## 2025-04-04 PROCEDURE — 96365 THER/PROPH/DIAG IV INF INIT: CPT

## 2025-04-04 PROCEDURE — 96372 THER/PROPH/DIAG INJ SC/IM: CPT

## 2025-04-04 RX ADMIN — MAGNESIUM SULFATE HEPTAHYDRATE: 500 INJECTION, SOLUTION INTRAMUSCULAR; INTRAVENOUS at 13:59

## 2025-04-04 RX ADMIN — PEGFILGRASTIM 6 MG: 6 INJECTION SUBCUTANEOUS at 13:52

## 2025-04-04 NOTE — PROGRESS NOTES
Pt presents for BRANDIE d/c, neulasta injection administered in ZAID, magnesium. No new meds or concerns. Pt tolerated treatment without adverse reaction. Future appointments discussed, confirmed with patient for 4/7/25 1330. AVS declined.

## 2025-04-07 ENCOUNTER — HOSPITAL ENCOUNTER (OUTPATIENT)
Dept: INFUSION CENTER | Facility: CLINIC | Age: 70
Discharge: HOME | End: 2025-04-07
Payer: MEDICARE

## 2025-04-07 VITALS
TEMPERATURE: 96.9 F | HEART RATE: 86 BPM | SYSTOLIC BLOOD PRESSURE: 130 MMHG | DIASTOLIC BLOOD PRESSURE: 82 MMHG | RESPIRATION RATE: 18 BRPM

## 2025-04-07 DIAGNOSIS — Z17.0 MALIGNANT NEOPLASM OF UPPER-OUTER QUADRANT OF RIGHT BREAST IN FEMALE, ESTROGEN RECEPTOR POSITIVE (HCC): Primary | ICD-10-CM

## 2025-04-07 DIAGNOSIS — C50.411 MALIGNANT NEOPLASM OF UPPER-OUTER QUADRANT OF RIGHT BREAST IN FEMALE, ESTROGEN RECEPTOR POSITIVE (HCC): Primary | ICD-10-CM

## 2025-04-07 LAB
ALBUMIN SERPL BCG-MCNC: 3.5 G/DL (ref 3.5–5)
ALP SERPL-CCNC: 183 U/L (ref 34–104)
ALT SERPL W P-5'-P-CCNC: 9 U/L (ref 7–52)
ANION GAP SERPL CALCULATED.3IONS-SCNC: 7 MMOL/L (ref 4–13)
ANISOCYTOSIS BLD QL SMEAR: PRESENT
AST SERPL W P-5'-P-CCNC: 24 U/L (ref 13–39)
BASOPHILS # BLD MANUAL: 0 THOUSAND/UL (ref 0–0.1)
BASOPHILS NFR MAR MANUAL: 0 % (ref 0–1)
BILIRUB SERPL-MCNC: 0.46 MG/DL (ref 0.2–1)
BUN SERPL-MCNC: 25 MG/DL (ref 5–25)
CALCIUM SERPL-MCNC: 9.9 MG/DL (ref 8.4–10.2)
CHLORIDE SERPL-SCNC: 100 MMOL/L (ref 96–108)
CO2 SERPL-SCNC: 29 MMOL/L (ref 21–32)
CREAT SERPL-MCNC: 1.13 MG/DL (ref 0.6–1.3)
EOSINOPHIL # BLD MANUAL: 0 THOUSAND/UL (ref 0–0.4)
EOSINOPHIL NFR BLD MANUAL: 0 % (ref 0–6)
ERYTHROCYTE [DISTWIDTH] IN BLOOD BY AUTOMATED COUNT: 16.4 % (ref 11.6–15.1)
GFR SERPL CREATININE-BSD FRML MDRD: 49 ML/MIN/1.73SQ M
GLUCOSE SERPL-MCNC: 94 MG/DL (ref 65–140)
HCT VFR BLD AUTO: 27.9 % (ref 34.8–46.1)
HGB BLD-MCNC: 9.3 G/DL (ref 11.5–15.4)
LYMPHOCYTES # BLD AUTO: 0 % (ref 14–44)
LYMPHOCYTES # BLD AUTO: 0 THOUSAND/UL (ref 0.6–4.47)
MACROCYTES BLD QL AUTO: PRESENT
MAGNESIUM SERPL-MCNC: 1.9 MG/DL (ref 1.9–2.7)
MCH RBC QN AUTO: 31.4 PG (ref 26.8–34.3)
MCHC RBC AUTO-ENTMCNC: 33.3 G/DL (ref 31.4–37.4)
MCV RBC AUTO: 94 FL (ref 82–98)
MONOCYTES # BLD AUTO: 0.91 THOUSAND/UL (ref 0–1.22)
MONOCYTES NFR BLD: 2 % (ref 4–12)
NEUTROPHILS # BLD MANUAL: 44.65 THOUSAND/UL (ref 1.85–7.62)
NEUTS BAND NFR BLD MANUAL: 2 % (ref 0–8)
NEUTS SEG NFR BLD AUTO: 96 % (ref 43–75)
PLATELET # BLD AUTO: 286 THOUSANDS/UL (ref 149–390)
PLATELET BLD QL SMEAR: ADEQUATE
PMV BLD AUTO: 10.6 FL (ref 8.9–12.7)
POTASSIUM SERPL-SCNC: 4 MMOL/L (ref 3.5–5.3)
PROT SERPL-MCNC: 7.4 G/DL (ref 6.4–8.4)
RBC # BLD AUTO: 2.96 MILLION/UL (ref 3.81–5.12)
RBC MORPH BLD: PRESENT
SODIUM SERPL-SCNC: 136 MMOL/L (ref 135–147)
WBC # BLD AUTO: 45.56 THOUSAND/UL (ref 4.31–10.16)

## 2025-04-07 PROCEDURE — 83735 ASSAY OF MAGNESIUM: CPT | Performed by: INTERNAL MEDICINE

## 2025-04-07 PROCEDURE — 80053 COMPREHEN METABOLIC PANEL: CPT | Performed by: INTERNAL MEDICINE

## 2025-04-07 PROCEDURE — 85007 BL SMEAR W/DIFF WBC COUNT: CPT | Performed by: INTERNAL MEDICINE

## 2025-04-07 PROCEDURE — 96360 HYDRATION IV INFUSION INIT: CPT

## 2025-04-07 PROCEDURE — 85027 COMPLETE CBC AUTOMATED: CPT | Performed by: INTERNAL MEDICINE

## 2025-04-07 RX ADMIN — SODIUM CHLORIDE 1000 ML: 0.9 INJECTION, SOLUTION INTRAVENOUS at 13:54

## 2025-04-07 NOTE — PROGRESS NOTES
Maira Moura  tolerated hydration well with no complications. No mag level available since last Monday, so new levels drawn and hydration only given per discussion with carlo coffey rn.     Maira Moura is aware of future appt on Wednesday Apr 9, 2025 12:30 PM.     AVS declined by Maira Moura.

## 2025-04-07 NOTE — PLAN OF CARE
Problem: Potential for Falls  Goal: Patient will remain free of falls  Description: INTERVENTIONS:- Educate patient/family on patient safety including physical limitations- Instruct patient to call for assistance with activity - Consult OT/PT to assist with strengthening/mobility - Keep Call bell within reach- Keep bed low and locked with side rails adjusted as appropriate- Keep care items and personal belongings within reach- Initiate and maintain comfort rounds-  Consider moving patient to room near nurses station  Outcome: Completed

## 2025-04-08 ENCOUNTER — OFFICE VISIT (OUTPATIENT)
Dept: SPEECH THERAPY | Facility: CLINIC | Age: 70
End: 2025-04-08
Payer: MEDICARE

## 2025-04-08 DIAGNOSIS — R13.12 DYSPHAGIA, OROPHARYNGEAL PHASE: Primary | ICD-10-CM

## 2025-04-08 DIAGNOSIS — Z90.89 STATUS POST TONSILLECTOMY: ICD-10-CM

## 2025-04-08 DIAGNOSIS — C78.7 METASTATIC COLON CANCER TO LIVER (HCC): Primary | ICD-10-CM

## 2025-04-08 DIAGNOSIS — C18.9 METASTATIC COLON CANCER TO LIVER (HCC): Primary | ICD-10-CM

## 2025-04-08 DIAGNOSIS — C09.9 RIGHT TONSILLAR SQUAMOUS CELL CARCINOMA (HCC): ICD-10-CM

## 2025-04-08 PROCEDURE — 92526 ORAL FUNCTION THERAPY: CPT

## 2025-04-08 NOTE — PROGRESS NOTES
"Daily Speech Treatment Note    Today's date: 2025   Patient’s name: Maira Moura  : 1955  MRN: [id number]  Safety measures: HTN, CVA, GERD, active CA, s/p PEG tube placement, aspiration precautions  Current recommended diet: PEG tube for primary nutrition/hydration; puree with thin liquids P.O. as tolerated   Referring provider: Harika Medina DO Encounter Diagnosis     ICD-10-CM    1. Dysphagia, oropharyngeal phase  R13.12       2. Status post tonsillectomy  Z90.89       3. 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         Visit Tracking:  POC   Expires Auth Expiration Date ST Visit Limit   25 or 10th visit 2025 [facility]              Visit/Unit Tracking:  Auth Status Date 25       Not required Used 1 2  3 4 5  6         Remaining 9 8  7 6  4  3          Subjective/Behavioral:  -Patient arrived via [facility] today.    Patient reports she tried a chicken drumstick (got stuck)  Came today with \"Little bites\" mini muffins (NEW)    Continues to tolerate: egg salad, tuna salad, spagettios, peaches and mandarin oranges  Difficulty with: plantains, beefaroni, chicken    Objective/Assessment:    Short-term goals:  Patient will tolerate P.O. trials of her recommended diet with the implementation of safe swallowing strategies without overt s/sx of penetration and/or aspiration during skilled therapy sessions in this certification period (to be achieved in 4-6 weeks). -- PARTIALLY MET    Traditional VitalStim     Channel(s) used: 1, 2   Placement: 3a   Duty Cycle: 57/1 s   Frequency: 80 pulses per second   Phase Duration: 300 microseconds   Treatment Duration: 35 minutes   Min mA level: 7.0 mA   Max mA level: 7.0 mA      Patient tolerated NMES in conjunction with pharyngeal/laryngeal strengthening exercises and P.O. intake. (The patient received instruction on the potential benefits from NMES treatment, including neuromuscular " "re-education and muscle strengthening.) Skin condition post-NMES: intact.      Patient consumed the following during today’s session: \"Little Bites - Blueberry Mini Muffins\" and water     First, small bite; used water to wash; throat clears after swallow. Second bite was large - patient added water to breakdown texture in her oral cavity. Reports the \"little bits of blueberry\" feel like they are getting stuck. Patient did not exhibit any choking sensation. Patient used a plastic knife to cut around the bits of blueberries and tolerated with less globus. She ate all 5 mini muffins in the first 15 minutes of the session.       1.5 Cecille Doone cookies and 1/2 applesauce with water (thin liquid) via side of cup -- NMES remained on during pharyngeal strengthening and IOPI exercises (see below).     Patient without any c/o odynophagia again today. Oral containment and bolus formation/control, AP transfer, and swallow initiation were judged to be WFL.  Reduced hyolaryngeal elevation and excursion noted upon palpation. No oral cavity residue was observed. Patient with no c/o globus sensation on macaroni noodles. Cough x1 noted on thin liquid during session trials. Vocal quality remained at baseline status. No other overt s/sx of penetration/aspiration noted during today's session.    Patient will perform oral motor exercises using IOPI device on lingual muscles to facilitate increased function and strength by 25% for improved swallowing function (to be achieved in 6-8 weeks). -- PARTIALLY MET    [employer] Pro Pmax Data Tracker Grid (from [id number]):       01/09/25 01/14/25 01/23/25 03/20/25 03/25/25 03/27/25 04/03/25 04/08/25   Tongue tip (GOAL = 56) 50 49 49 70  58  61  63 58   Tongue back (GOAL = 50) 47 54 61 69  62 64 69 63       [employer] Pro -- Today's Exercise Targets:       Pmax (after 3 trials): Effort level: Target for today's treatment:   Tongue tip  (GOAL = 56) 58 85%    49   Tongue back  (GOAL = 50) 63 85%    " 54         IOPI Pro -- Exercise Tracker Grid (from [id number]):       01/09/25 01/14/25 01/23/25 03/20/25 03/25/25 03/27/25 04/03/25 04/08/25   Tongue tip 3 sets of 30 reps (pumps)    3 sets of 30 reps (pumps)     Endurance holds (50% of max) x 2; avg 20    3 sets of 30 reps (pumps)     1 sets of 3 reps (20.47 second holds) 4 sets of 30 reps (pumps)  4 sets of 30 reps (pumps) 2 sets of 30 reps (pumps) 2 sets of 30 reps (pumps) 3 sets of 30 reps (pumps)   Tongue back 3 sets of 30 reps (pumps)    3 sets of 30 reps (pumps)     Endurance holds (50% of max) x 2; avg 23 sec    3 sets of 30 reps (pumps)     1 sets of 3 reps (16.92 second holds) 4 sets of 30 reps (pumps)  4 sets of 30 reps (pumps) 2 sets of 30 reps (pumps) 2 sets of 30 reps (pumps) 3 sets of 30 reps (pumps)       Patient will independently complete pharyngeal strengthening exercises (e.g., Effortful swallow, Mendelsohn, Evelia) daily to facilitate increased pharyngeal strength and coordination (to be achieved in 4-6 weeks).     Plan:  -Patient was provided with home exercises/activities to target goals in plan of care at the end of today's session.  -Continue with current plan of care.

## 2025-04-08 NOTE — PROGRESS NOTES
Outpatient Oncology Nutrition Consultation   Type of Consult: Follow Up   Care Location: [facility]    Reason for referral: Received notification by [facility] on 8/21/24 that pt has triggered for oncology nutrition care (reason for referral: HNC dx and Malnutrition Screening Tool (MST) Triggers: scored a 0 indicating No recent wt loss and is not eating poorly due to a decreased appetite. 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 She was last seen 12/20/24.      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, unresectable  7/31/2023 - FOLFOX  11/20/2023 - opdivo added to FOLFOX  12/18/2023-cycle 11-20% dose reduction of 5-FU bolus and oxaliplatin due to increased fatigue  Jan 2024 - oxaliplatin removed from treatment plan due to neuropathy - PET scan shows good disease control in all areas except colon lesion  3/2024 - right hemicolectomy - pT3N0  6/3/24 - cryoablation to liver (no interruption to systemic therapy)  7/30/2024 - stop  folfox  9/2024 - 10/2024 - cis/RT to tonsils and cervical Lns  12/2024 - disease progression of colon cancer in the liver  01/2025 - Initiated FOLFIRI every 14-days, with 50% dose-reduction of Irinotecan due to pre-existing diarrhea.  February 2025: systemic chemotherapy held due to patient experiencing prolonged side effects of diarrhea and neutropenia.   March 2025: resume C3 of FOLFIRI       12/27/24 PET scan-  1. Interval progression of hypermetabolic malignancy/metastatic disease involving the liver.  2. Interval near complete metabolic response to therapy of previously noted hypermetabolic malignancy/metastatic disease involving the neck.  3. Essentially stable large right adnexal mass with low-level associated FDG activity.  4. 3 mm right lung nodule, new since prior PET/CT. Short interval follow-up with CT of chest in 3 months is recommended to ensure stability and exclude developing pulmonary malignancy/metastatic disease.      12/31/24 Dr. Medina- Clear progression of colon cancer in liver on PET. FOLFIRI with 50% dose reduction of the irinotecan because of pre-existing diarrhea. Cholestyramine TID and Imodium. Continue hydration 3 times a week    1/30/25 Dr. Matthew- Tonsillar fossa is still healing. Evidence of thrush. Will continue Nystatin. F/u 1-2 months    2/4/25 Dr. Medina- due for cycle 3 of FOLFIRI which has been poorly tolerated. Significant diarrhea. Neutropenic. Hold this cycle and f/u in 2 weeks.    2/18/25 Dr. Medina- Give patient another 2 weeks off from chemo. New CT scan during treatment break. Discussed hospice care if chemo adversely affects QOL      2/24/25 CT C/A/P-  1.  Interval progression of hepatic metastatic disease with increase in size and number of lesions.  2.  Slight increase in size of a right lower lobe pulmonary nodule. There are also a few new tiny pulmonary nodules measuring up to 3 mm. This is suspicious for metastatic disease. Recommend attention on 3-month  follow-up.  3.  Right thyroid nodule measuring 1.8 cm. Consider correlation with thyroid ultrasound.      25 Dr. Medina- Restart chemo with 50% dose reduction of irinotecan and remove 5-FU bolus and leucovorin. CT with disease progression    3/18/25 Dr. Medina- Did well with restart of FOLFIRI. Continue without adjustments    25 Dr. Medina- Proceed with cycle 4, doing remarkably well. Continue seeing every 2 weeks. CT scan after 6 cycles      Upcomin25 speech therapy  25 Dr. Medina  25 palliative care     Malignant neoplasm of right female breast (HCC)   2018 Initial Diagnosis    Malignant neoplasm of right female breast (HCC)     2018 Biopsy    Rt Breast US BX 1100 8cmfn, 4 passes 12g Marquee:  - Invasive breast carcinoma of no special type (ductal NST/invasive ductal carcinoma).  - grade 2  - %  - NC 95%  - HER2 negative     2018 Surgery    Right partial mastectomy and SLN biopsy:  Infiltrating ductal carcinoma, Grade 2/3, felt to be between 2.0 cm and 2.5 cm in greatest dimension  - No invasive tumor is seen at inked margins, but there is a focus of DCIS seen within approximately 1mm of the inked medial margin  - no LVI  - no LCIS  - 0/2 LN's  at least Stage IIA - pT2, pN0, cM0, G2.    - Dr Coronado     2018 -  Cancer Staged    Stage IIA - pT2, pN0, cM0, G2.       2019 Genomic Testing    Oncotype DX score: 13     2019 -  Hormone Therapy    anastrozole 1 mg daily as adjuvant endocrine therapy    - Dr Daley       2019 - 4/3/2019 Radiation    Course: C1    Plan ID Energy Fractions Dose per Fraction (cGy) Dose Correction (cGy) Total Dose Delivered (cGy) Elapsed Days   R Breast 10X/6X 25 / 25 200 0 5,000 40   R BRST BOOST 16E  200 0 1,000 7      Treatment dates:  C1: 2019 - 4/3/2019       Metastatic colon cancer to liver (HCC)   2023 Genetic Testing    CARIS Results:   KRAS Pathogenic Variant Exon 2  p.G12D : lack of benefit  "of cetuximab, panitumumab Level 2   No other actionable mutation; MSI stable; TMB low   MiFOLOXAi Results: \"Decreased Benefit of FOLFOX + Bevacizumab in first line metastatic CRC.  This patient may achieve improved results by receiving an alternative to FOLFOX, such as FOLFIRI, as their initial regimen. As an adjustment to frontline FOLFOXIRI following toxicity: This patient may achieve improved results by removing the oxaliplatin portion of their regimen\".         7/11/2023 Initial Diagnosis    Metastatic colon cancer to liver (HCC)     7/13/2023 -  Cancer Staged    Staging form: Colon and Rectum, AJCC 8th Edition  - Clinical stage from 7/13/2023: cT3, cN0, pM1 - Signed by Harika Medina DO on 9/28/2023  Total positive nodes: 0       7/31/2023 - 8/1/2024 Chemotherapy    cyanocobalamin, 1,000 mcg, Intramuscular, Every 30 days, 7 of 9 cycles  Administration: 1,000 mcg (5/9/2024), 1,000 mcg (5/24/2024), 1,000 mcg (6/20/2024), 1,000 mcg (7/3/2024), 1,000 mcg (7/18/2024), 1,000 mcg (8/1/2024)  potassium chloride, 20 mEq, Intravenous, Once, 2 of 2 cycles  Administration: 20 mEq (11/6/2023), 20 mEq (11/20/2023)  alteplase (CATHFLO), 2 mg, Intracatheter, Every 1 Minute as needed, 21 of 23 cycles  Administration: 2 mg (1/3/2024)  pegfilgrastim (NEULASTA), 6 mg, Subcutaneous, Once, 12 of 12 cycles  Administration: 6 mg (10/25/2023), 6 mg (11/8/2023), 6 mg (11/22/2023), 6 mg (12/6/2023), 6 mg (12/20/2023), 6 mg (1/12/2024), 6 mg (1/25/2024), 6 mg (4/25/2024), 6 mg (5/9/2024), 6 mg (5/24/2024), 6 mg (6/20/2024)  fluorouracil (ADRUCIL), 895 mg, Intravenous, Once, 21 of 23 cycles  Dose modification: 320 mg/m2 (original dose 400 mg/m2, Cycle 11)  Administration: 900 mg (7/31/2023), 900 mg (8/14/2023), 900 mg (8/28/2023), 900 mg (9/11/2023), 900 mg (9/25/2023), 900 mg (10/9/2023), 900 mg (10/23/2023), 895 mg (11/6/2023), 895 mg (11/20/2023), 895 mg (12/4/2023), 700 mg (12/18/2023), 680 mg (1/10/2024), 680 mg (1/23/2024), 625 mg " (4/23/2024), 625 mg (5/7/2024), 625 mg (5/22/2024), 625 mg (6/4/2024), 625 mg (6/18/2024), 625 mg (7/1/2024), 625 mg (7/16/2024), 625 mg (7/30/2024)  nivolumab (OPDIVO) IVPB, 240 mg (100 % of original dose 240 mg), Intravenous, Once, 13 of 15 cycles  Dose modification: 240 mg (original dose 240 mg, Cycle 9)  Administration: 240 mg (11/20/2023), 240 mg (12/4/2023), 240 mg (12/18/2023), 240 mg (1/10/2024), 240 mg (1/23/2024), 240 mg (4/23/2024), 240 mg (5/7/2024), 240 mg (5/22/2024), 240 mg (6/4/2024), 240 mg (6/18/2024), 240 mg (7/1/2024), 240 mg (7/16/2024), 240 mg (7/30/2024)  leucovorin calcium IVPB, 896 mg, Intravenous, Once, 21 of 23 cycles  Administration: 900 mg (7/31/2023), 900 mg (8/14/2023), 900 mg (8/28/2023), 900 mg (9/11/2023), 900 mg (9/25/2023), 900 mg (10/9/2023), 900 mg (10/23/2023), 900 mg (11/6/2023), 900 mg (11/20/2023), 900 mg (12/4/2023), 900 mg (12/18/2023), 850 mg (1/10/2024), 850 mg (1/23/2024), 800 mg (4/23/2024), 800 mg (5/7/2024), 800 mg (5/22/2024), 800 mg (6/4/2024), 800 mg (6/18/2024), 800 mg (7/1/2024), 800 mg (7/16/2024), 800 mg (7/30/2024)  oxaliplatin (ELOXATIN) chemo infusion, 85 mg/m2 = 190.4 mg, Intravenous, Once, 12 of 12 cycles  Dose modification: 68 mg/m2 (original dose 85 mg/m2, Cycle 11, Reason: Other (Must fill in a comment), Comment: increased fatigue)  Administration: 190.4 mg (7/31/2023), 190.4 mg (8/14/2023), 200 mg (8/28/2023), 200 mg (9/11/2023), 200 mg (9/25/2023), 200 mg (10/9/2023), 200 mg (10/23/2023), 200 mg (11/6/2023), 200 mg (11/20/2023), 190.4 mg (12/4/2023), 150 mg (12/18/2023), 150 mg (1/10/2024)  fluorouracil (ADRUCIL) ambulatory infusion Soln, 1,200 mg/m2/day = 5,375 mg, Intravenous, Over 46 hours, 21 of 23 cycles     9/26/2023 -  Cancer Staged    Staging form: Colon and Rectum, AJCC 8th Edition  - Pathologic: pT3, pN0, cM1 - Signed by Vickie Israel MD on 4/3/2024  Total positive nodes: 0       3/12/2024 Surgery    A. Terminal ileum, appendix, right  colon (right hemicolectomy):  - Adenocarcinoma (5.2cm)  - Forty-nine (49) lymph nodes negative for carcinoma (0/49)    - Acellular mucin present in one (1) lymph node   - See staging synoptic (ypT3N0)     1/8/2025 -  Chemotherapy    fluorouracil (ADRUCIL), 400 mg/m2 = 770 mg, 2 of 2 cycles  Administration: 770 mg (1/8/2025), 770 mg (1/22/2025)  irinotecan (CAMPTOSAR) chemo infusion, 173 mg (50 % of original dose 180 mg/m2), 6 of 11 cycles  Dose modification: 90 mg/m2 (original dose 180 mg/m2, Cycle 1, Reason: Anticipated Tolerance, Comment: 50% dose reduction due to pre-existing diarrhea)  Administration: 180 mg (1/8/2025), 180 mg (1/22/2025), 160 mg (3/5/2025), 160 mg (3/19/2025), 160 mg (4/2/2025)  leucovorin calcium IVPB, 768 mg, 2 of 2 cycles  Administration: 800 mg (1/8/2025), 800 mg (1/22/2025)  fluorouracil (ADRUCIL) ambulatory infusion Soln, 1,200 mg/m2/day = 4,610 mg, 6 of 11 cycles     Right tonsillar squamous cell carcinoma (HCC)   7/27/2023 Initial Diagnosis    Right tonsillar squamous cell carcinoma (HCC)     7/27/2023 -  Cancer Staged    Staging form: Pharynx - Oropharynx, AJCC 8th Edition  - Clinical stage from 7/27/2023: Stage III (cT1, cN3, cM0, p16+) - Signed by Evan Plummer MD on 7/27/2023  Stage prefix: Initial diagnosis       9/16/2024 - 11/13/2024 Radiation      Plan ID Energy Fractions Dose per Fraction (cGy) Dose Correction (cGy) Total Dose Delivered (cGy) Elapsed Days   Head_Neck 6X-FFF 35 / 35 200 0 7,000 58    C2: 9/16/2024 - 11/13/2024 9/17/2024 - 10/21/2024 Chemotherapy    alteplase (CATHFLO), 2 mg, Intracatheter, Every 1 Minute as needed, 6 of 6 cycles  Administration: 2 mg (10/21/2024)  CISplatin (PLATINOL) infusion, 70 mg (original dose 40 mg/m2), Intravenous, Once, 6 of 6 cycles  Dose modification: 70 mg (original dose 40 mg/m2, Cycle 1, Reason: Anticipated Tolerance), 30 mg/m2 (original dose 40 mg/m2, Cycle 4, Reason: Neuropathy, Comment: dose reduction to 30 mg/m2  due to neuropathy)  Administration: 70 mg (9/17/2024), 70 mg (9/23/2024), 70 mg (9/30/2024), 59.1 mg (10/7/2024), 59.1 mg (10/14/2024), 59.1 mg (10/21/2024)  sodium chloride, 500 mL, Intravenous, Once, 6 of 6 cycles  Administration: 500 mL (9/17/2024), 500 mL (9/17/2024), 500 mL (9/23/2024), 500 mL (9/23/2024), 500 mL (9/30/2024), 500 mL (9/30/2024), 500 mL (10/7/2024), 500 mL (10/7/2024), 500 mL (10/14/2024), 500 mL (10/14/2024), 500 mL (10/21/2024)  fosaprepitant (EMEND) IVPB, 150 mg, Intravenous, Once, 6 of 6 cycles  Administration: 150 mg (9/17/2024), 150 mg (9/23/2024), 150 mg (9/30/2024), 150 mg (10/7/2024), 150 mg (10/14/2024), 150 mg (10/21/2024)  IVPB builder, , Intravenous, Once, 1 of 1 cycle  Administration: Unknown dose (10/21/2024)       Past Medical & Surgical Hx:   Patient Active Problem List   Diagnosis    Primary hypertension    Mixed hyperlipidemia    Malignant neoplasm of right female breast (HCC)    Vitamin D deficiency    Prediabetes    Right thyroid nodule    GERD (gastroesophageal reflux disease)    Obesity    Metastatic colon cancer to liver (HCC)    Right tonsillar squamous cell carcinoma (HCC)    Poor appetite    Nutritional anemia    Dehydration    Drug-induced constipation    Chemotherapy induced neutropenia (HCC)    Stage 3a chronic kidney disease (HCC)    Thrombocytopenia (HCC)    Neuropathy    Diastolic dysfunction    Hypokalemia    Leukemoid reaction    GOGO (acute kidney injury) (HCC)    Acute post-operative pain    At risk for constipation    At risk for delirium    Palliative care encounter    Physical deconditioning    History of CVA (cerebrovascular accident)    Chronic nasal congestion    Elevated LFTs    Vitamin B12 deficiency    Neoplastic malignant related fatigue    Sensation, choking    S/P percutaneous endoscopic gastrostomy (PEG) tube placement (HCC)    Pancytopenia due to chemotherapy (HCC)    Cancer related pain    Hypomagnesemia    Functional diarrhea    Antineoplastic  chemotherapy induced anemia    Hypertensive heart and renal disease with congestive heart failure (HCC)     Past Medical History:   Diagnosis Date    Anemia     Arthritis     Body mass index (BMI) 40.0-44.9, adult (HCC) 9/22/2023    Breast cancer (HCC) 12/17/2018    Cancer (HCC)     right breast, colon, liver, right tonsil    Cervical lymphadenopathy 3/21/2023    CT neck on 3/30/2023- Large right level 2A lymphadenopathy as described above and suspicious for neoplasm.  Correlation with histopathology is recommended.  Mild asymmetric prominence of the right palatine tonsil with otherwise no definitive nodular enhancing lesions identified along the course of the aerodigestive tract.     5/26/23- FNA of this node was nonrevealing for tissue etiology, but it d    Encounter for screening for HIV 07/07/2022    Follow-up examination 04/04/2023    GERD (gastroesophageal reflux disease)     Hyperlipidemia     Hypertension     Obesity     Stroke (HCC)     TIA     Stroke (HCC)     TIA     Transaminitis 09/22/2021    Vasomotor rhinitis 8/9/2018    Refilled flonase today. Stopped taking since January 2022     Past Surgical History:   Procedure Laterality Date    BREAST BIOPSY Right 11/23/2018    us guided bx cancer    BREAST SURGERY      COLONOSCOPY      ESOPHAGOSCOPY N/A 07/20/2023    Procedure: ESOPHAGOSCOPY;  Surgeon: David Chapa MD;  [facility];  Service: ENT    HEMICOLOECTOMY W/ ANASTOMOSIS Right 3/12/2024    Procedure: RIGHT COLECTOMY WITH ROBOTICS;  Surgeon: Vickie Israel MD;  [facility];  Service: Surgical Oncology    IR BIOPSY LIVER MASS  06/27/2023    IR CRYOABLATION  6/3/2024    IR GASTROSTOMY (G) TUBE CHECK/CHANGE/REINSERTION/UPSIZE  4/9/2025    IR GASTROSTOMY TUBE PLACEMENT  10/2/2024    IR PORT CHECK  01/03/2024    IR PORT PLACEMENT  07/28/2023    IR PORT STRIPPING  01/09/2024    LAPAROTOMY N/A 3/12/2024    Procedure: LAPAROTOMY EXPLORATORY W/ ROBOTICS;  Surgeon: Vickie Israel MD;   [facility];  Service: Surgical Oncology    MN BIOPSY NASOPHARYNX VISIBLE LESION SIMPLE N/A 10/9/2024    Procedure: NASOPHARYNGOSCOPY with biospy;  Surgeon: Juanito Matthew MD;  Location: [facility];  Service: ENT    MN [facility] INCL FLUOR GDNCE DX W/CELL WASHG SPX N/A 2023    Procedure: BRONCHOSCOPY;  Surgeon: David Chapa MD;  [facility];  Service: ENT    MN LARYNGOSCOPY W/BIOPSY MICROSCOPE/TELESCOPE N/A 2023    Procedure: MICRODIRECT LARYNGOSCOPY WITH BIOPSY;  Surgeon: David Chapa MD;  [facility];  Service: ENT    MN LARYNGOSCOPY W/WO TRACHEOSCOPY DX EXCEPT  N/A 10/9/2024    Procedure: DIRECT LARYNGOSCOPY;  Surgeon: Juanito Matthew MD;  [facility];  Service: ENT    MN MASTECTOMY PARTIAL W/AXILLARY LYMPHADENECTOMY Right 2018    Procedure: ULTRASOUND LOCALIZED PARTIAL MASTECTOMY W/SENTINEL NODE BIOPSY POSS AXILLARY DISSECTION;  Surgeon: Zahida Coronado MD;  Location: [facility];  Service: General    MN TONSILLECTOMY PRIMARY/SECONDARY AGE 12/> N/A 10/9/2024    Procedure: TONSILLECTOMY;  Surgeon: Juanito Matthew MD;  [facility];  Service: ENT    TUBAL LIGATION      US GUIDED BREAST BIOPSY RIGHT COMPLETE Right 2018    US GUIDED INJECTION FOR RESEARCH STUDY  2018    US GUIDED INJECTION FOR RESEARCH STUDY  2018    US GUIDED INJECTION FOR RESEARCH STUDY  2019    US GUIDED LYMPH NODE BIOPSY RIGHT  2023       Review of Medications:   Vitamins, Supplements and Herbals: No, pt denies taking supplements    Current Outpatient Medications:     acetaminophen (TYLENOL) 160 mg/5 mL liquid, Take 20 mL (640 mg total) by mouth every 6 (six) hours as needed for mild pain for up to 10 days, Disp: 473 mL, Rfl: 3    anastrozole (ARIMIDEX) 1 mg tablet, Take 1 tablet (1 mg total) by mouth daily, Disp: 90 tablet, Rfl: 0    atorvastatin (LIPITOR) 40 mg tablet, Take 1 tablet (40 mg total) by mouth daily at bedtime,  Disp: 30 tablet, Rfl: 2    cholestyramine (QUESTRAN) 4 g packet, Take 1 packet (4 g total) by mouth 3 (three) times a day with meals, Disp: 90 packet, Rfl: 3    diphenhydramine, lidocaine, Al/Mg hydroxide, simethicone (Magic Mouthwash) SUSP, Swish and swallow 10 mL every 4 (four) hours as needed for mouth pain or discomfort (Patient not taking: Reported on 1/31/2025), Disp: 480 mL, Rfl: 2    docusate sodium (COLACE) 50 mg capsule, Take 1 capsule (50 mg total) by mouth 2 (two) times a day, Disp: 90 capsule, Rfl: 1    ergocalciferol (VITAMIN D2) 50,000 units, Take 1 capsule (50,000 Units total) by mouth once a week, Disp: 90 capsule, Rfl: 0    fluorouracil 4,390 mg in CADD/Elastomeric Infusion Device, Infuse 4,390 mg (1,200 mg/m2/day x 1.83 m2) into a catheter in a vein via infusion device over 46 hours for 2 days  Infusion planned for April 16, 2025., Disp: 1 each, Rfl: 0    folic acid (FOLVITE) 1 mg tablet, Take 1 tablet (1 mg total) by mouth in the morning, Disp: 90 tablet, Rfl: 3    gabapentin (NEURONTIN) 300 mg capsule, Take 1 pills in the morning, 1 pill at lunch and 2 pills before bed, Disp: 120 capsule, Rfl: 0    hydrocortisone 1 % ointment, , Disp: , Rfl:     ibuprofen (MOTRIN) 100 mg/5 mL suspension, Take 20 mL (400 mg total) by mouth every 6 (six) hours as needed for moderate pain (Patient not taking: Reported on 1/31/2025), Disp: 473 mL, Rfl: 0    lidocaine-prilocaine (EMLA) cream, Apply topically as needed for mild pain (Patient not taking: Reported on 12/6/2024), Disp: 30 g, Rfl: 3    losartan (COZAAR) 50 mg tablet, Take 1 tablet (50 mg total) by mouth daily, Disp: 90 tablet, Rfl: 2    magnesium Oxide (MAG-OX) 400 mg TABS, 1 tablet (400 mg total) by Per G Tube route 2 (two) times a day (Patient not taking: Reported on 1/31/2025), Disp: 60 tablet, Rfl: 0    mirtazapine (REMERON) 15 mg tablet, Take 1 tablet (15 mg total) by mouth daily at bedtime Patient is also on a 30 mg tablet, for a total dose of 45  mg, Disp: 30 tablet, Rfl: 0    mirtazapine (REMERON) 30 mg tablet, Take 1 tablet (30 mg total) by mouth daily at bedtime, Disp: 30 tablet, Rfl: 0    nystatin (MYCOSTATIN) 500,000 units/5 mL suspension, Apply 5 mL (500,000 Units total) to the mouth or throat 4 (four) times a day, Disp: 600 mL, Rfl: 3    omeprazole (PriLOSEC) 20 mg delayed release capsule, Take 1 capsule (20 mg total) by mouth daily, Disp: 30 capsule, Rfl: 3    ondansetron (ZOFRAN) 8 mg tablet, Take 1 tablet (8 mg total) by mouth every 8 (eight) hours as needed for nausea or vomiting, Disp: 90 tablet, Rfl: 2    oxyCODONE (ROXICODONE) 5 mg/5 mL solution, Take 5 mL (5 mg total) by mouth every 6 (six) hours as needed for severe pain ((breakthrough pain)) Max Daily Amount: 20 mg, Disp: 473 mL, Rfl: 0    potassium chloride (Klor-Con M20) 20 mEq tablet, Take 1 tablet (20 mEq total) by mouth 2 (two) times a day (Patient not taking: Reported on 1/31/2025), Disp: 90 tablet, Rfl: 1    potassium chloride 10% oral solution, 15 mL (20 mEq total) by Per G Tube route 2 (two) times a day, Disp: 473 mL, Rfl: 0    prochlorperazine (COMPAZINE) 10 mg tablet, Take 1 tablet (10 mg total) by mouth every 6 (six) hours as needed for nausea or vomiting, Disp: 90 tablet, Rfl: 2    pyridoxine (VITAMIN B6) 50 mg tablet, Take 1 tablet (50 mg total) by mouth daily, Disp: 90 tablet, Rfl: 3    vitamin B-12 (VITAMIN B-12) 1,000 mcg tablet, , Disp: , Rfl:   No current facility-administered medications for this visit.    Facility-Administered Medications Ordered in Other Visits:     alteplase (CATHFLO) injection 2 mg, 2 mg, Intracatheter, Q1MIN PRN, Harika Agostino, DO    alteplase (CATHFLO) injection 2 mg, 2 mg, Intracatheter, Q1MIN PRN, Harika Agostino, DO    alteplase (CATHFLO) injection 2 mg, 2 mg, Intracatheter, Q1MIN PRN, Harika Agostino, DO    Atropine Sulfate injection 0.25 mg, 0.25 mg, Intravenous, Once PRN, Harika Agostino, DO    sodium chloride 0.9 % bolus 1,000 mL, 1,000 mL,  "Intravenous, Once, Harika Medina, DO    Most Recent Lab Results:   Lab Results   Component Value Date    WBC 9.05 04/14/2025    NEUTROABS 7.22 04/14/2025    IRON 21 (L) 02/28/2025    TIBC 170.8 (L) 02/28/2025    FERRITIN 712 (H) 02/28/2025    CHOLESTEROL 167 04/24/2024    TRIG 137 04/24/2024    HDL 43 (L) 04/24/2024    LDLCALC 97 04/24/2024    ALT 9 04/14/2025    AST 22 04/14/2025    ALB 3.6 04/14/2025    SODIUM 136 04/14/2025    SODIUM 133 (L) 04/11/2025    K 3.9 04/14/2025    K 4.4 04/11/2025     04/14/2025    BUN 23 04/14/2025    BUN 30 (H) 04/11/2025    CREATININE 1.04 04/14/2025    CREATININE 1.08 04/11/2025    EGFR 54 04/14/2025    PHOS 2.8 11/01/2024    PHOS 3.3 10/30/2024    TSH 1.58 01/03/2022    GLUCOSE 209 (H) 03/12/2024    POCGLU 89 12/27/2024    GLUF 102 (H) 10/30/2024    GLUF 95 09/13/2024    GLUC 100 04/14/2025    HGBA1C 5.5 02/21/2024    HGBA1C 5.9 (H) 06/13/2023    HGBA1C 6.1 (H) 07/09/2022    CALCIUM 9.7 04/14/2025    FOLATE 14.1 02/28/2025    MG 1.9 04/14/2025       Anthropometric Measurements:   Height: 66\"  Ht Readings from Last 1 Encounters:   04/16/25 5' 5.98\" (1.676 m)     Wt Readings from Last 20 Encounters:   04/16/25 73.5 kg (162 lb)   04/02/25 72.6 kg (160 lb 0.9 oz)   04/01/25 72.6 kg (160 lb)   03/19/25 70.8 kg (156 lb 1.4 oz)   03/18/25 71.2 kg (157 lb)   03/05/25 73.1 kg (161 lb 2.5 oz)   02/28/25 72.6 kg (160 lb)   02/18/25 73 kg (161 lb)   02/04/25 73.4 kg (161 lb 12.8 oz)   01/31/25 75.1 kg (165 lb 9.6 oz)   01/30/25 75.6 kg (166 lb 10.7 oz)   01/22/25 75.6 kg (166 lb 10.7 oz)   01/10/25 77.1 kg (170 lb)   01/08/25 76.5 kg (168 lb 10.4 oz)   12/31/24 82.6 kg (182 lb)   12/20/24 82.1 kg (181 lb)   12/19/24 84.4 kg (186 lb)   12/06/24 84.4 kg (186 lb)   11/26/24 82.2 kg (181 lb 4 oz)   10/29/24 86.2 kg (190 lb)       Weight History:   Usual Weight: 275#  Varian: (2/22/19) 264.6#, (4/2/19) 263.4, (9/16/24) 197.4#, (9/23/24) 194#, (9/30/24) 192.8#, (10/7/24) 190.2#, (10/11/24) " 194.6#, (10/14/24) 191#, (10/17/24) 192.8#, (10/21/24) 190#, (10/24/24) 190#, (10/28/24) 189#, (11/11/24) 185#  Home Scale: n/a    Oncology Nutrition-Anthropometrics      Flowsheet Row Nutrition from 4/16/2025 in [facility] Nutrition from 4/2/2025 in [facility]   Patient [age] (years): 70 years 70 years   Patient (female) height (in): 66 in 66 in   Current Weight to be used for anthropometric calculations (lbs) 162 lbs 160.9 lbs   Current Weight to be used for anthropometric calculations (kg) 73.6 kg 73.1 kg   BMI: 26.1 26   IBW female: 130 lbs 130 lbs   IBW (kg) female: 59.1 kg 59.1 kg   IBW % (female) 124.6 % 123.8 %   Adjusted BW (female): 138 lbs 137.7 lbs   Adjusted BW kg (female): 62.7 kg 62.6 kg   % weight change after 1 month: 3.2 % -0.2 %   Weight change after 1 month (lbs) 5 lbs -0.3 lbs   % weight change after 3 months: -4.7 % -4.6 %   Weight change after 3 months (lbs) -8 lbs -7.8 lbs            Nutrition-Focused Physical Findings: none observed    Food/Nutrition-Related History & Client/Social History:    Current Nutrition Impact Symptoms:  [] Nausea  [x] Reduced Appetite  [] Acid Reflux    [] Vomiting  [x] Unintended Wt Loss -stable now [] Malabsorption    [] Diarrhea -resolved [] Unintended Wt Gain  [] Dumping Syndrome    [] Constipation -BM every other day [] Thick Mucous/Secretions  [] Abdominal Pain    [] Dysgeusia (Altered Taste)  [x] Xerostomia (Dry Mouth)  [] Gas    [] Dysosmia (Altered Smell)  [] Thrush  [] Difficulty Chewing    [] Oral Mucositis (Sore Mouth)  [x] Fatigue  [] Hyperglycemia   Lab Results   Component Value Date    HGBA1C 5.5 02/21/2024      [x] Odynophagia -improving [] Esophagitis  [] Other:    [x] Dysphagia -enteral nutrition as 1' source of nutrition  Follows with SLP  Pureed with thins recommended by SLP as of 4/3/25 [] Early Satiety  [] No Problems Eating      Food Allergies & Intolerances:  no    Current Diet: Tube Feeding. pureed diet, small amounts of PO  Current Nutrition Intake: Increased since last visit  Appetite: Fair   Nutrition Route: PO and PEG  Oral Care: brushes daily  Activity level: ADLs.     24 Hr Diet Recall:   Breakfast: oatmeal with 1 egg  Lunch: egg salad  Dinner: rice krispie cereal w/ 2 %    Beverages: water (sips throughout the day)  Supplements:   Ensure Complete (10 oz, 350 kcal, 30 g pro) -1 daily, usually puts through tube    EN Recall:  DME: [facility]  Access Type: PEG  Formula: [employer] Peptide 1.5  Method: Bolus  Regimen: 11oz (325 mL) 2  times per day of [employer] 1.5 via PEG (gravity syringe method to administer boluses; 1 container infusing over ~15-20 minutes).  Flush: 60 mL free water flush  pre/post each bolus (120 mL x 2 boluses = 240 mL)  EN providin mL volume (2 cartons/day), 1000 kcal (43% est needs), 48g protein (52% est needs), 456 mL free water, 696 mL total water (30% est needs).  Pt also flushing 1-2 syringes every few hours for added hydration. Pt gets IVF 3x/week.       Oncology Nutrition-Estimated Needs      Flowsheet Row Nutrition from 2025 in [facility] Nutrition from 2024 in [facility]   Weight type used Actual weight Adjusted weight   Weight in kilograms (kg) used for estimated needs 77.3 kg 66.4 kg   Energy needs formula:  30-35 kcal/kg 30-35 kcal/kg   Energy needs based on 30 kcal/k kcal  kcal   Energy needs based on 35 kcal/k kcal 2323 kcal   Protein needs formula: 1.2-1.5 g/kg 1.2-1.5 g/kg   Protein needs based on 1.2 g/k g 80 g   Protein needs based on 1.5 g/kg 116 g 100 g   Fluid needs formula: 30-35 mL/kg 30-35 mL/kg   Fluid needs based on 30 mL/kg 2319 mL 1992 mL   Fluid needs in ounces 78 oz 67 oz   Fluid needs based on 35 mL/kg 2706 mL 2324 mL   Fluid needs in ounces 91 oz 79 oz             Discussion & Intervention:    Maira was evaluated today for an RD follow up regarding HNC and enteral nutrition.  Maira has completed tx for HNC and is currently undergoing tx for metastatic colon cancer .  Met with Maira in the infusion center today. She continues to work with SLP. She feels she's been able to eat more orally lately. She is consistently eating 2 small meals, sometimes 3 meals. She uses her feeding tube for most of her nutrition- 2 cartons of [employer], and 1 bottle of Ensure complete. Her weight is stable. She will continue with current diet/TF until she starts to eat more PO.    Reviewed 24 hour recall, which revealed an adequate enteral intake and PO intake, and discussed ways to optimize nutrient intake.  Also reviewed the importance of wt management throughout the tx process and the role of enteral nutrition in managing wt and overall health.  Based on today's assessment, discussion included: MNT for:   fluid, soft/mechanical/pureed diet,  enteral nutrition, practicing proper oral care, adequate hydration & fluid choices, utilizing oral nutrition supplements, practicing proper feeding tube use & care, adherence to and compliance with enteral nutrition plan of care, and individualized enteral nutrition recommendations & plan:   .   Moving forward, Maira was encouraged to continue TFl  and continue increasing oral intake   Materials Provided: Ensure samples and Ensure Coupons  All questions and concerns addressed during today’s visit.  Maira has RD contact information.    Nutrition Diagnosis:   Increased Nutrient Needs (kcal & pro) related to increased demand for nutrients and disease state as evidenced by cancer dx and pt undergoing tx for cancer.  Swallowing Difficulty related to diagnosis/treatment related causes as evidenced by  need for feeding tube, diet restriction per SLP.  Monitoring & Evaluation:   Goals:  weight maintenance/stabilization  adequate nutrition impact symptom management  pt to meet  >/=75% estimated nutrition needs daily  achieve tube feeding goal rate    Progress Towards Goals: Progressing    Nutrition Rx & Recommendations:  Diet: enteral nutrition as 1' source of nutrition  Stay hydrated by sipping fluids of choice/tolerance throughout the day.    Follow proper oral care; Try baking soda/salt water rinse recipe (mix 3/4 tsp salt + 1 tsp baking soda + 1 qt water; rinse with plain water after using) in Eating Hints book (pg 18).  Brush your teeth before/after meals & before bed.  Weigh yourself regularly. If you notice weight loss, make an effort to increase your daily food/calorie intake. If you continue to notice loss after these efforts, reach out to your dietitian to establish a plan to stabilize weight.   Continue with Ensure Plus/Complete 1-2 times daily. Can try drinking orally, or use through PEG if tolerated better  Choose liquids with calories: whole milk, Fairlife milk (higher protein/lactose-free milk), chocolate milk, drinkable yogurt, kefir, 100% fruit juice, diluted juice, bone broth (higher protein broth), creamy soups, sports drinks (Gatorade, Poweraide, Pedialyte, etc.), Italian ice, popsicles, milkshakes, smoothies, oral nutrition supplements (Ensure, Boost, etc.), gelatin/Jello, etc.  Soft/easy to swallow foods that are high in calories and protein: casseroles, chicken/egg/tuna salad with extra garcia to add calories and moisture, oatmeal/cream of wheat made with whole milk, cottage cheese, whole milk Greek yogurt, scrambled eggs with cheese, avocado, macaroni and cheese, mashed potatoes made with butter and whole milk or dry milk powder, ground meat with extra sauce/gravy, canned fruit, peanut butter, cream soups (cream of chicken, cream of mushroom, broccoli cheddar), pudding made with whole milk, custard, ice cream, banana, milkshakes/smoothies, Review page 47 of Eating Hints Book for more ideas.   TF plan -   TF Goal:  Continue [employer] 1.5, at lease 2  cartons/day  Contact RD for substitutions  Call [employer] when you have 10 days left of TF supplies: [phone], option 3 (customer support).    Follow Up Plan:  during infusion on 4/30/25    Recommend Referral to Other Providers: none at this time

## 2025-04-09 ENCOUNTER — HOSPITAL ENCOUNTER (OUTPATIENT)
Dept: INFUSION CENTER | Facility: CLINIC | Age: 70
Discharge: HOME | End: 2025-04-09
Payer: MEDICARE

## 2025-04-09 ENCOUNTER — HOSPITAL ENCOUNTER (OUTPATIENT)
Dept: RADIOLOGY | Facility: HOSPITAL | Age: 70
Discharge: HOME | End: 2025-04-09
Payer: MEDICARE

## 2025-04-09 DIAGNOSIS — C50.411 MALIGNANT NEOPLASM OF UPPER-OUTER QUADRANT OF RIGHT BREAST IN FEMALE, ESTROGEN RECEPTOR POSITIVE (HCC): Primary | ICD-10-CM

## 2025-04-09 DIAGNOSIS — Z17.0 MALIGNANT NEOPLASM OF UPPER-OUTER QUADRANT OF RIGHT BREAST IN FEMALE, ESTROGEN RECEPTOR POSITIVE (HCC): Primary | ICD-10-CM

## 2025-04-09 DIAGNOSIS — C78.7 METASTATIC COLON CANCER TO LIVER (HCC): ICD-10-CM

## 2025-04-09 DIAGNOSIS — C18.9 METASTATIC COLON CANCER TO LIVER (HCC): ICD-10-CM

## 2025-04-09 LAB
ALBUMIN SERPL BCG-MCNC: 3.6 G/DL (ref 3.5–5)
ALP SERPL-CCNC: 171 U/L (ref 34–104)
ALT SERPL W P-5'-P-CCNC: 10 U/L (ref 7–52)
ANION GAP SERPL CALCULATED.3IONS-SCNC: 7 MMOL/L (ref 4–13)
AST SERPL W P-5'-P-CCNC: 23 U/L (ref 13–39)
BASOPHILS # BLD AUTO: 0.16 THOUSANDS/ÂΜL (ref 0–0.1)
BASOPHILS NFR BLD AUTO: 1 % (ref 0–1)
BILIRUB SERPL-MCNC: 0.28 MG/DL (ref 0.2–1)
BUN SERPL-MCNC: 29 MG/DL (ref 5–25)
CALCIUM SERPL-MCNC: 9.8 MG/DL (ref 8.4–10.2)
CHLORIDE SERPL-SCNC: 99 MMOL/L (ref 96–108)
CO2 SERPL-SCNC: 29 MMOL/L (ref 21–32)
CREAT SERPL-MCNC: 1.19 MG/DL (ref 0.6–1.3)
EOSINOPHIL # BLD AUTO: 0.14 THOUSAND/ÂΜL (ref 0–0.61)
EOSINOPHIL NFR BLD AUTO: 1 % (ref 0–6)
ERYTHROCYTE [DISTWIDTH] IN BLOOD BY AUTOMATED COUNT: 16.8 % (ref 11.6–15.1)
GFR SERPL CREATININE-BSD FRML MDRD: 46 ML/MIN/1.73SQ M
GLUCOSE SERPL-MCNC: 96 MG/DL (ref 65–140)
HCT VFR BLD AUTO: 26.7 % (ref 34.8–46.1)
HGB BLD-MCNC: 8.8 G/DL (ref 11.5–15.4)
IMM GRANULOCYTES # BLD AUTO: 0.19 THOUSAND/UL (ref 0–0.2)
IMM GRANULOCYTES NFR BLD AUTO: 1 % (ref 0–2)
LYMPHOCYTES # BLD AUTO: 0.78 THOUSANDS/ÂΜL (ref 0.6–4.47)
LYMPHOCYTES NFR BLD AUTO: 4 % (ref 14–44)
MAGNESIUM SERPL-MCNC: 1.9 MG/DL (ref 1.9–2.7)
MCH RBC QN AUTO: 31.4 PG (ref 26.8–34.3)
MCHC RBC AUTO-ENTMCNC: 33 G/DL (ref 31.4–37.4)
MCV RBC AUTO: 95 FL (ref 82–98)
MONOCYTES # BLD AUTO: 1.26 THOUSAND/ÂΜL (ref 0.17–1.22)
MONOCYTES NFR BLD AUTO: 7 % (ref 4–12)
NEUTROPHILS # BLD AUTO: 15.28 THOUSANDS/ÂΜL (ref 1.85–7.62)
NEUTS SEG NFR BLD AUTO: 86 % (ref 43–75)
NRBC BLD AUTO-RTO: 0 /100 WBCS
PLATELET # BLD AUTO: 262 THOUSANDS/UL (ref 149–390)
PMV BLD AUTO: 10.6 FL (ref 8.9–12.7)
POTASSIUM SERPL-SCNC: 3.9 MMOL/L (ref 3.5–5.3)
PROT SERPL-MCNC: 7.4 G/DL (ref 6.4–8.4)
RBC # BLD AUTO: 2.8 MILLION/UL (ref 3.81–5.12)
SODIUM SERPL-SCNC: 135 MMOL/L (ref 135–147)
WBC # BLD AUTO: 17.81 THOUSAND/UL (ref 4.31–10.16)

## 2025-04-09 PROCEDURE — C1769 GUIDE WIRE: HCPCS

## 2025-04-09 PROCEDURE — 49450 REPLACE G/C TUBE PERC: CPT

## 2025-04-09 PROCEDURE — 83735 ASSAY OF MAGNESIUM: CPT | Performed by: INTERNAL MEDICINE

## 2025-04-09 PROCEDURE — 49450 REPLACE G/C TUBE PERC: CPT | Performed by: STUDENT IN AN ORGANIZED HEALTH CARE EDUCATION/TRAINING PROGRAM

## 2025-04-09 PROCEDURE — 80053 COMPREHEN METABOLIC PANEL: CPT | Performed by: INTERNAL MEDICINE

## 2025-04-09 PROCEDURE — 85025 COMPLETE CBC W/AUTO DIFF WBC: CPT | Performed by: INTERNAL MEDICINE

## 2025-04-09 RX ADMIN — IOHEXOL 20 ML: 350 INJECTION, SOLUTION INTRAVENOUS at 10:56

## 2025-04-09 RX ADMIN — MAGNESIUM SULFATE HEPTAHYDRATE: 500 INJECTION, SOLUTION INTRAMUSCULAR; INTRAVENOUS at 12:35

## 2025-04-09 NOTE — BRIEF OP NOTE (RAD/CATH)
INTERVENTIONAL RADIOLOGY PROCEDURE NOTE    Date: 4/9/2025    Procedure:   Procedure Summary       Date: 04/09/25 Room / Location: [facility] Interventional Radiology    Anesthesia Start:  Anesthesia Stop:     Procedure: IR GASTROSTOMY (G) TUBE CHECK/CHANGE/REINSERTION/UPSIZE Diagnosis:       Metastatic colon cancer to liver (HCC)      (routine G tube exchange)    Scheduled Providers:  Responsible Provider:     Anesthesia Type: Not recorded ASA Status: Not recorded            Preoperative diagnosis:   1. Metastatic colon cancer to liver (HCC)         Postoperative diagnosis: Same.    Surgeon: Matthew Pantoja MD     Assistant: None. No qualified resident was available.    Blood loss: 0 ml    Specimens: none.     Findings:   18 Fr Gtube exchange.    Complications: None immediate.    Anesthesia: none

## 2025-04-09 NOTE — PROGRESS NOTES
Pt. Denied new symptoms or concerns today. Labs obtained via port a cath. Pt. Tolerated 1000ml NSS with Magnesium without adverse event.   Future appointment reviewed. Pt. Will return 4/11/25 at 1230.  AVS declined.

## 2025-04-09 NOTE — PLAN OF CARE
Problem: INFECTION - ADULT  Goal: Absence or prevention of progression during hospitalization  Description: INTERVENTIONS:- Assess and monitor for signs and symptoms of infection- Monitor lab/diagnostic results- Monitor all insertion sites, i.e. indwelling lines, tubes, and drains- Monitor endotracheal if appropriate and nasal secretions for changes in amount and color- Anderson appropriate cooling/warming therapies per order- Administer medications as ordered- Instruct and encourage patient and family to use good hand hygiene technique- Identify and instruct in appropriate isolation precautions for identified infection/condition  Outcome: Progressing  Goal: Absence of fever/infection during neutropenic period  Description: INTERVENTIONS:- Monitor WBC  Outcome: Progressing     Problem: Knowledge Deficit  Goal: Patient/family/caregiver demonstrates understanding of disease process, treatment plan, medications, and discharge instructions  Description: Complete learning assessment and assess knowledge base.Interventions:- Provide teaching at level of understanding- Provide teaching via preferred learning methods  Outcome: Progressing

## 2025-04-11 ENCOUNTER — HOSPITAL ENCOUNTER (OUTPATIENT)
Dept: INFUSION CENTER | Facility: CLINIC | Age: 70
End: 2025-04-11
Attending: INTERNAL MEDICINE
Payer: MEDICARE

## 2025-04-11 ENCOUNTER — PATIENT OUTREACH (OUTPATIENT)
Dept: HEMATOLOGY ONCOLOGY | Facility: CLINIC | Age: 70
End: 2025-04-11

## 2025-04-11 VITALS
SYSTOLIC BLOOD PRESSURE: 100 MMHG | DIASTOLIC BLOOD PRESSURE: 62 MMHG | HEART RATE: 74 BPM | TEMPERATURE: 97.6 F | RESPIRATION RATE: 18 BRPM

## 2025-04-11 DIAGNOSIS — C50.411 MALIGNANT NEOPLASM OF UPPER-OUTER QUADRANT OF RIGHT BREAST IN FEMALE, ESTROGEN RECEPTOR POSITIVE (HCC): Primary | ICD-10-CM

## 2025-04-11 DIAGNOSIS — Z17.0 MALIGNANT NEOPLASM OF UPPER-OUTER QUADRANT OF RIGHT BREAST IN FEMALE, ESTROGEN RECEPTOR POSITIVE (HCC): Primary | ICD-10-CM

## 2025-04-11 LAB
ALBUMIN SERPL BCG-MCNC: 3.6 G/DL (ref 3.5–5)
ALP SERPL-CCNC: 156 U/L (ref 34–104)
ALT SERPL W P-5'-P-CCNC: 10 U/L (ref 7–52)
ANION GAP SERPL CALCULATED.3IONS-SCNC: 5 MMOL/L (ref 4–13)
AST SERPL W P-5'-P-CCNC: 21 U/L (ref 13–39)
BASOPHILS # BLD AUTO: 0.07 THOUSANDS/ÂΜL (ref 0–0.1)
BASOPHILS NFR BLD AUTO: 0 % (ref 0–1)
BILIRUB SERPL-MCNC: 0.23 MG/DL (ref 0.2–1)
BUN SERPL-MCNC: 30 MG/DL (ref 5–25)
CALCIUM SERPL-MCNC: 9.5 MG/DL (ref 8.4–10.2)
CHLORIDE SERPL-SCNC: 99 MMOL/L (ref 96–108)
CO2 SERPL-SCNC: 29 MMOL/L (ref 21–32)
CREAT SERPL-MCNC: 1.08 MG/DL (ref 0.6–1.3)
EOSINOPHIL # BLD AUTO: 0.12 THOUSAND/ÂΜL (ref 0–0.61)
EOSINOPHIL NFR BLD AUTO: 1 % (ref 0–6)
ERYTHROCYTE [DISTWIDTH] IN BLOOD BY AUTOMATED COUNT: 17.2 % (ref 11.6–15.1)
GFR SERPL CREATININE-BSD FRML MDRD: 52 ML/MIN/1.73SQ M
GLUCOSE SERPL-MCNC: 101 MG/DL (ref 65–140)
HCT VFR BLD AUTO: 26.4 % (ref 34.8–46.1)
HGB BLD-MCNC: 8.6 G/DL (ref 11.5–15.4)
IMM GRANULOCYTES # BLD AUTO: 0.16 THOUSAND/UL (ref 0–0.2)
IMM GRANULOCYTES NFR BLD AUTO: 1 % (ref 0–2)
LYMPHOCYTES # BLD AUTO: 0.83 THOUSANDS/ÂΜL (ref 0.6–4.47)
LYMPHOCYTES NFR BLD AUTO: 5 % (ref 14–44)
MAGNESIUM SERPL-MCNC: 2 MG/DL (ref 1.9–2.7)
MCH RBC QN AUTO: 31.3 PG (ref 26.8–34.3)
MCHC RBC AUTO-ENTMCNC: 32.6 G/DL (ref 31.4–37.4)
MCV RBC AUTO: 96 FL (ref 82–98)
MONOCYTES # BLD AUTO: 1.51 THOUSAND/ÂΜL (ref 0.17–1.22)
MONOCYTES NFR BLD AUTO: 9 % (ref 4–12)
NEUTROPHILS # BLD AUTO: 13.48 THOUSANDS/ÂΜL (ref 1.85–7.62)
NEUTS SEG NFR BLD AUTO: 84 % (ref 43–75)
NRBC BLD AUTO-RTO: 0 /100 WBCS
PLATELET # BLD AUTO: 272 THOUSANDS/UL (ref 149–390)
PMV BLD AUTO: 10.5 FL (ref 8.9–12.7)
POTASSIUM SERPL-SCNC: 4.4 MMOL/L (ref 3.5–5.3)
PROT SERPL-MCNC: 7.3 G/DL (ref 6.4–8.4)
RBC # BLD AUTO: 2.75 MILLION/UL (ref 3.81–5.12)
SODIUM SERPL-SCNC: 133 MMOL/L (ref 135–147)
WBC # BLD AUTO: 16.17 THOUSAND/UL (ref 4.31–10.16)

## 2025-04-11 PROCEDURE — 80053 COMPREHEN METABOLIC PANEL: CPT | Performed by: INTERNAL MEDICINE

## 2025-04-11 PROCEDURE — 83735 ASSAY OF MAGNESIUM: CPT | Performed by: INTERNAL MEDICINE

## 2025-04-11 PROCEDURE — 85025 COMPLETE CBC W/AUTO DIFF WBC: CPT | Performed by: INTERNAL MEDICINE

## 2025-04-11 PROCEDURE — 96365 THER/PROPH/DIAG IV INF INIT: CPT

## 2025-04-11 RX ADMIN — MAGNESIUM SULFATE HEPTAHYDRATE: 500 INJECTION, SOLUTION INTRAMUSCULAR; INTRAVENOUS at 12:56

## 2025-04-11 NOTE — PLAN OF CARE
Problem: Potential for Falls  Goal: Patient will remain free of falls  Description: INTERVENTIONS:- Educate patient/family on patient safety including physical limitations- Instruct patient to call for assistance with activity - Consult OT/PT to assist with strengthening/mobility - Keep Call bell within reach- Keep bed low and locked with side rails adjusted as appropriate- Keep care items and personal belongings within reach- Initiate and maintain comfort rounds- Make Falocks and bracelet for high fall risk patients- Consider moving patient to room near nurses station  Outcome: Progressing

## 2025-04-11 NOTE — PROGRESS NOTES
Pt called to confirm she is scheduled for transportation today to her infusion appointment, I confirmed via round trip she is scheduled for transportation today

## 2025-04-11 NOTE — PROGRESS NOTES
Pt without complaint, tolerated IV hydration with magnesium as ordered without incident. AVS declined, pt aware of appt 4/14 [id number], [facility] booked.

## 2025-04-14 ENCOUNTER — HOSPITAL ENCOUNTER (OUTPATIENT)
Dept: INFUSION CENTER | Facility: CLINIC | Age: 70
Discharge: HOME | End: 2025-04-14
Payer: MEDICARE

## 2025-04-14 VITALS
TEMPERATURE: 96.7 F | DIASTOLIC BLOOD PRESSURE: 76 MMHG | HEART RATE: 82 BPM | SYSTOLIC BLOOD PRESSURE: 118 MMHG | RESPIRATION RATE: 16 BRPM

## 2025-04-14 DIAGNOSIS — Z17.0 MALIGNANT NEOPLASM OF UPPER-OUTER QUADRANT OF RIGHT BREAST IN FEMALE, ESTROGEN RECEPTOR POSITIVE (HCC): Primary | ICD-10-CM

## 2025-04-14 DIAGNOSIS — C50.411 MALIGNANT NEOPLASM OF UPPER-OUTER QUADRANT OF RIGHT BREAST IN FEMALE, ESTROGEN RECEPTOR POSITIVE (HCC): Primary | ICD-10-CM

## 2025-04-14 LAB
ALBUMIN SERPL BCG-MCNC: 3.6 G/DL (ref 3.5–5)
ALP SERPL-CCNC: 126 U/L (ref 34–104)
ALT SERPL W P-5'-P-CCNC: 9 U/L (ref 7–52)
ANION GAP SERPL CALCULATED.3IONS-SCNC: 7 MMOL/L (ref 4–13)
AST SERPL W P-5'-P-CCNC: 22 U/L (ref 13–39)
BASOPHILS # BLD AUTO: 0.06 THOUSANDS/ÂΜL (ref 0–0.1)
BASOPHILS NFR BLD AUTO: 1 % (ref 0–1)
BILIRUB SERPL-MCNC: 0.29 MG/DL (ref 0.2–1)
BUN SERPL-MCNC: 23 MG/DL (ref 5–25)
CALCIUM SERPL-MCNC: 9.7 MG/DL (ref 8.4–10.2)
CHLORIDE SERPL-SCNC: 101 MMOL/L (ref 96–108)
CO2 SERPL-SCNC: 28 MMOL/L (ref 21–32)
CREAT SERPL-MCNC: 1.04 MG/DL (ref 0.6–1.3)
EOSINOPHIL # BLD AUTO: 0.15 THOUSAND/ÂΜL (ref 0–0.61)
EOSINOPHIL NFR BLD AUTO: 2 % (ref 0–6)
ERYTHROCYTE [DISTWIDTH] IN BLOOD BY AUTOMATED COUNT: 17.2 % (ref 11.6–15.1)
GFR SERPL CREATININE-BSD FRML MDRD: 54 ML/MIN/1.73SQ M
GLUCOSE SERPL-MCNC: 100 MG/DL (ref 65–140)
HCT VFR BLD AUTO: 29.5 % (ref 34.8–46.1)
HGB BLD-MCNC: 9.4 G/DL (ref 11.5–15.4)
IMM GRANULOCYTES # BLD AUTO: 0.04 THOUSAND/UL (ref 0–0.2)
IMM GRANULOCYTES NFR BLD AUTO: 0 % (ref 0–2)
LYMPHOCYTES # BLD AUTO: 0.65 THOUSANDS/ÂΜL (ref 0.6–4.47)
LYMPHOCYTES NFR BLD AUTO: 7 % (ref 14–44)
MAGNESIUM SERPL-MCNC: 1.9 MG/DL (ref 1.9–2.7)
MCH RBC QN AUTO: 30.3 PG (ref 26.8–34.3)
MCHC RBC AUTO-ENTMCNC: 31.9 G/DL (ref 31.4–37.4)
MCV RBC AUTO: 95 FL (ref 82–98)
MONOCYTES # BLD AUTO: 0.93 THOUSAND/ÂΜL (ref 0.17–1.22)
MONOCYTES NFR BLD AUTO: 10 % (ref 4–12)
NEUTROPHILS # BLD AUTO: 7.22 THOUSANDS/ÂΜL (ref 1.85–7.62)
NEUTS SEG NFR BLD AUTO: 80 % (ref 43–75)
NRBC BLD AUTO-RTO: 0 /100 WBCS
PLATELET # BLD AUTO: 287 THOUSANDS/UL (ref 149–390)
PMV BLD AUTO: 10.8 FL (ref 8.9–12.7)
POTASSIUM SERPL-SCNC: 3.9 MMOL/L (ref 3.5–5.3)
PROT SERPL-MCNC: 7.1 G/DL (ref 6.4–8.4)
RBC # BLD AUTO: 3.1 MILLION/UL (ref 3.81–5.12)
SODIUM SERPL-SCNC: 136 MMOL/L (ref 135–147)
WBC # BLD AUTO: 9.05 THOUSAND/UL (ref 4.31–10.16)

## 2025-04-14 PROCEDURE — 83735 ASSAY OF MAGNESIUM: CPT | Performed by: INTERNAL MEDICINE

## 2025-04-14 PROCEDURE — 96360 HYDRATION IV INFUSION INIT: CPT

## 2025-04-14 PROCEDURE — 85025 COMPLETE CBC W/AUTO DIFF WBC: CPT | Performed by: INTERNAL MEDICINE

## 2025-04-14 PROCEDURE — 80053 COMPREHEN METABOLIC PANEL: CPT | Performed by: INTERNAL MEDICINE

## 2025-04-14 RX ADMIN — SODIUM CHLORIDE 1000 ML: 0.9 INJECTION, SOLUTION INTRAVENOUS at 13:05

## 2025-04-14 NOTE — PROGRESS NOTES
Pt arrived for her hydration. No need for Magnesium today. Port accessed without any incident. Labs drawn and NSS infused fully without any incident. Pt tolerated same well.    Declined AVS and is aware to return on 4/16 at 11:30.    Was discharged in stable ambulatory conditions with all of her belongings.

## 2025-04-14 NOTE — PLAN OF CARE
Problem: Potential for Falls  Goal: Patient will remain free of falls  Description: INTERVENTIONS:- Educate patient/family on patient safety including physical limitations- Instruct patient to call for assistance with activity - Consult OT/PT to assist with strengthening/mobility - Keep Call bell within reach- Keep bed low and locked with side rails adjusted as appropriate- Keep care items and personal belongings within reach- Initiate and maintain comfort rounds- Make Fall Risk Sign visible to staff- Offer Toileting every    Hours, in advance of need- Initiate/Maintain   alarm- Obtain necessary fall risk management equipment:   - Apply yellow socks and bracelet for high fall risk patients- Consider moving patient to room near nurses station  Outcome: Adequate for Discharge

## 2025-04-15 ENCOUNTER — PATIENT OUTREACH (OUTPATIENT)
Dept: HEMATOLOGY ONCOLOGY | Facility: CLINIC | Age: 70
End: 2025-04-15

## 2025-04-15 ENCOUNTER — TELEPHONE (OUTPATIENT)
Dept: HEMATOLOGY ONCOLOGY | Facility: CLINIC | Age: 70
End: 2025-04-15

## 2025-04-15 ENCOUNTER — OFFICE VISIT (OUTPATIENT)
Dept: SPEECH THERAPY | Facility: CLINIC | Age: 70
End: 2025-04-15
Payer: MEDICARE

## 2025-04-15 ENCOUNTER — TELEMEDICINE (OUTPATIENT)
Dept: HEMATOLOGY ONCOLOGY | Facility: CLINIC | Age: 70
End: 2025-04-15
Payer: MEDICARE

## 2025-04-15 DIAGNOSIS — C18.9 METASTATIC COLON CANCER TO LIVER (HCC): Primary | ICD-10-CM

## 2025-04-15 DIAGNOSIS — C09.9 RIGHT TONSILLAR SQUAMOUS CELL CARCINOMA (HCC): ICD-10-CM

## 2025-04-15 DIAGNOSIS — R13.12 DYSPHAGIA, OROPHARYNGEAL PHASE: Primary | ICD-10-CM

## 2025-04-15 DIAGNOSIS — Z17.0 MALIGNANT NEOPLASM OF UPPER-OUTER QUADRANT OF RIGHT BREAST IN FEMALE, ESTROGEN RECEPTOR POSITIVE (HCC): ICD-10-CM

## 2025-04-15 DIAGNOSIS — C78.7 METASTATIC COLON CANCER TO LIVER (HCC): Primary | ICD-10-CM

## 2025-04-15 DIAGNOSIS — Z90.89 STATUS POST TONSILLECTOMY: ICD-10-CM

## 2025-04-15 DIAGNOSIS — C18.9 METASTATIC COLON CANCER TO LIVER (HCC): ICD-10-CM

## 2025-04-15 DIAGNOSIS — C50.411 MALIGNANT NEOPLASM OF UPPER-OUTER QUADRANT OF RIGHT BREAST IN FEMALE, ESTROGEN RECEPTOR POSITIVE (HCC): ICD-10-CM

## 2025-04-15 DIAGNOSIS — C78.7 METASTATIC COLON CANCER TO LIVER (HCC): ICD-10-CM

## 2025-04-15 PROCEDURE — 99213 OFFICE O/P EST LOW 20 MIN: CPT | Performed by: INTERNAL MEDICINE

## 2025-04-15 PROCEDURE — 92526 ORAL FUNCTION THERAPY: CPT

## 2025-04-15 PROCEDURE — G2211 COMPLEX E/M VISIT ADD ON: HCPCS | Performed by: INTERNAL MEDICINE

## 2025-04-15 RX ORDER — ATROPINE SULFATE 1 MG/ML
0.25 INJECTION, SOLUTION INTRAVENOUS ONCE
OUTPATIENT
Start: 2025-04-30

## 2025-04-15 RX ORDER — ATROPINE SULFATE 1 MG/ML
0.25 INJECTION, SOLUTION INTRAVENOUS ONCE AS NEEDED
OUTPATIENT
Start: 2025-04-30

## 2025-04-15 RX ORDER — SODIUM CHLORIDE 9 MG/ML
20 INJECTION, SOLUTION INTRAVENOUS ONCE
OUTPATIENT
Start: 2025-04-30

## 2025-04-15 NOTE — PROGRESS NOTES
Pt called to confirm her transportation for today has been scheduled. I confirmed via round trip her transportation is scheduled. She was thankful for the assistance

## 2025-04-15 NOTE — TELEPHONE ENCOUNTER
Spoke with patient regarding her appt today. Patient states that the transport took her home and not to her appt with us. I advised her we will change this to a virtual appt today and not to worry about it. Patient would also like a refill of the gabapentin as well. I advised her we will send this in for her. Patient thanked me for the call.

## 2025-04-15 NOTE — PROGRESS NOTES
Daily Speech Treatment Note    Today's date: 4/15/2025   Patient’s name: Maira Moura  : 1955  MRN: [id number]  Safety measures: HTN, CVA, GERD, active CA, s/p PEG tube placement, aspiration precautions  Current recommended diet: PEG tube for primary nutrition/hydration; puree with thin liquids P.O. as tolerated   Referring provider: Harika Medina DO Encounter Diagnosis     ICD-10-CM    1. Dysphagia, oropharyngeal phase  R13.12       2. Status post tonsillectomy  Z90.89       3. 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         Visit Tracking:  POC   Expires Auth Expiration Date ST Visit Limit   25 or 10th visit 2025 [facility]              Visit/Unit Tracking:  Auth Status Date 03/18/25 03/20/25 03/25/25 03/27/25 04/03/25  04/08/25 04/15/25     Not required Used 1 2  3 4 5  6 7       Remaining 9 8  7 6  4  3 2        Subjective/Behavioral:  -Patient arrived via [facility] transport today.    Patient reports she continues with egg salad mostly. Reports she tried rice krispies with milk and it went well.     Objective/Assessment:    Short-term goals:  Patient will tolerate P.O. trials of her recommended diet with the implementation of safe swallowing strategies without overt s/sx of penetration and/or aspiration during skilled therapy sessions in this certification period (to be achieved in 4-6 weeks). -- PARTIALLY MET    Traditional VitalStim     Channel(s) used: 1, 2   Placement: 3a   Duty Cycle: 57/1 s   Frequency: 80 pulses per second   Phase Duration: 300 microseconds   Treatment Duration: 35 minutes   Min mA level: 7.5 mA   Max mA level: 9.0 mA      Patient tolerated NMES in conjunction with pharyngeal/laryngeal strengthening exercises and P.O. intake. (The patient received instruction on the potential benefits from NMES treatment, including neuromuscular re-education and muscle strengthening.) Skin condition post-NMES: intact.      Patient consumed the following during today’s  session: pudding and  water (thin liquid) via side of cup -- NMES remained on during pharyngeal strengthening and IOPI exercises (see below).     Patient without any c/o odynophagia again today. Oral containment and bolus formation/control, AP transfer, and swallow initiation were judged to be WFL.  Reduced hyolaryngeal elevation and excursion noted upon palpation. No oral cavity residue was observed. Patient with no c/o globus sensation on macaroni noodles. Cough x1 noted on thin liquid during session trials. Vocal quality remained at baseline status. No other overt s/sx of penetration/aspiration noted during today's session.    Patient will perform oral motor exercises using IOPI device on lingual muscles to facilitate increased function and strength by 25% for improved swallowing function (to be achieved in 6-8 weeks). -- PARTIALLY MET    IOPI Pro Pmax Data Tracker Grid (from [id number]):       01/09/25 01/14/25 01/23/25 03/20/25   03/25/25  03/27/25 04/03/25 04/08/25 04/15/25   Tongue tip (GOAL = 56) 50 49 49 70  58  61  63 58 58   Tongue back (GOAL = 50) 47 54 61 69  62 64 69 63 63       IOPI Pro -- Today's Exercise Targets:       Pmax (after 3 trials): Effort level: Target for today's treatment:   Tongue tip  (GOAL = 56) 58 85%    49   Tongue back  (GOAL = 50) 63 85%    53         IOPI Pro -- Exercise Tracker Grid (from [id number]):       01/09/25 01/14/25 01/23/25 03/20/25   03/25/25 03/27/25  04/03/25 04/08/25 04/15/25   Tongue tip 3 sets of 30 reps (pumps)    3 sets of 30 reps (pumps)     Endurance holds (50% of max) x 2; avg 20    3 sets of 30 reps (pumps)     1 sets of 3 reps (20.47 second holds) 4 sets of 30 reps (pumps)  4 sets of 30 reps (pumps) 2 sets of 30 reps (pumps) 2 sets of 30 reps (pumps) 3 sets of 30 reps (pumps) 3 sets of 30 reps (pumps)   Tongue back 3 sets of 30 reps (pumps)    3 sets of 30 reps (pumps)     Endurance holds (50% of max) x 2; avg 23 sec    3 sets of 30 reps  (pumps)     1 sets of 3 reps (16.92 second holds) 4 sets of 30 reps (pumps)  4 sets of 30 reps (pumps) 2 sets of 30 reps (pumps) 2 sets of 30 reps (pumps) 3 sets of 30 reps (pumps) 3 sets of 30 reps (pumps)     Patient will independently complete pharyngeal strengthening exercises (e.g., Effortful swallow, Mendelsohn, Evelia) daily to facilitate increased pharyngeal strength and coordination (to be achieved in 4-6 weeks).     -Patient completed the following pharyngeal strengthening exercises during today's session:  *Effortful swallow: 1 set of 10 reps  *Mendelsohn: 1 set of 10 reps  *Evelia: 1 set of 10 reps    Plan:  -Patient was provided with home exercises/activities to target goals in plan of care at the end of today's session.  -Continue with current plan of care.

## 2025-04-16 ENCOUNTER — NUTRITION (OUTPATIENT)
Dept: NUTRITION | Facility: CLINIC | Age: 70
End: 2025-04-16

## 2025-04-16 ENCOUNTER — HOSPITAL ENCOUNTER (OUTPATIENT)
Dept: INFUSION CENTER | Facility: CLINIC | Age: 70
Discharge: HOME | End: 2025-04-16
Payer: MEDICARE

## 2025-04-16 VITALS
HEIGHT: 66 IN | RESPIRATION RATE: 18 BRPM | BODY MASS INDEX: 26.03 KG/M2 | HEART RATE: 88 BPM | TEMPERATURE: 97.2 F | WEIGHT: 162 LBS | DIASTOLIC BLOOD PRESSURE: 78 MMHG | SYSTOLIC BLOOD PRESSURE: 115 MMHG

## 2025-04-16 DIAGNOSIS — Z71.3 NUTRITIONAL COUNSELING: Primary | ICD-10-CM

## 2025-04-16 DIAGNOSIS — E86.0 DEHYDRATION: ICD-10-CM

## 2025-04-16 DIAGNOSIS — C18.9 METASTATIC COLON CANCER TO LIVER (HCC): Primary | ICD-10-CM

## 2025-04-16 DIAGNOSIS — C50.411 MALIGNANT NEOPLASM OF UPPER-OUTER QUADRANT OF RIGHT BREAST IN FEMALE, ESTROGEN RECEPTOR POSITIVE (HCC): ICD-10-CM

## 2025-04-16 DIAGNOSIS — C78.7 METASTATIC COLON CANCER TO LIVER (HCC): Primary | ICD-10-CM

## 2025-04-16 DIAGNOSIS — E86.0 DEHYDRATION: Primary | ICD-10-CM

## 2025-04-16 DIAGNOSIS — Z17.0 MALIGNANT NEOPLASM OF UPPER-OUTER QUADRANT OF RIGHT BREAST IN FEMALE, ESTROGEN RECEPTOR POSITIVE (HCC): ICD-10-CM

## 2025-04-16 LAB
ALBUMIN SERPL BCG-MCNC: 3.5 G/DL (ref 3.5–5)
ALP SERPL-CCNC: 116 U/L (ref 34–104)
ALT SERPL W P-5'-P-CCNC: 11 U/L (ref 7–52)
ANION GAP SERPL CALCULATED.3IONS-SCNC: 6 MMOL/L (ref 4–13)
AST SERPL W P-5'-P-CCNC: 23 U/L (ref 13–39)
BASOPHILS # BLD AUTO: 0.03 THOUSANDS/ÂΜL (ref 0–0.1)
BASOPHILS NFR BLD AUTO: 0 % (ref 0–1)
BILIRUB SERPL-MCNC: 0.33 MG/DL (ref 0.2–1)
BUN SERPL-MCNC: 23 MG/DL (ref 5–25)
CALCIUM SERPL-MCNC: 9.4 MG/DL (ref 8.4–10.2)
CHLORIDE SERPL-SCNC: 102 MMOL/L (ref 96–108)
CO2 SERPL-SCNC: 29 MMOL/L (ref 21–32)
CREAT SERPL-MCNC: 1.02 MG/DL (ref 0.6–1.3)
EOSINOPHIL # BLD AUTO: 0.15 THOUSAND/ÂΜL (ref 0–0.61)
EOSINOPHIL NFR BLD AUTO: 2 % (ref 0–6)
ERYTHROCYTE [DISTWIDTH] IN BLOOD BY AUTOMATED COUNT: 17 % (ref 11.6–15.1)
GFR SERPL CREATININE-BSD FRML MDRD: 56 ML/MIN/1.73SQ M
GLUCOSE SERPL-MCNC: 88 MG/DL (ref 65–140)
HCT VFR BLD AUTO: 28.6 % (ref 34.8–46.1)
HGB BLD-MCNC: 9.3 G/DL (ref 11.5–15.4)
IMM GRANULOCYTES # BLD AUTO: 0.05 THOUSAND/UL (ref 0–0.2)
IMM GRANULOCYTES NFR BLD AUTO: 1 % (ref 0–2)
LYMPHOCYTES # BLD AUTO: 0.66 THOUSANDS/ÂΜL (ref 0.6–4.47)
LYMPHOCYTES NFR BLD AUTO: 9 % (ref 14–44)
MAGNESIUM SERPL-MCNC: 2 MG/DL (ref 1.9–2.7)
MCH RBC QN AUTO: 31.1 PG (ref 26.8–34.3)
MCHC RBC AUTO-ENTMCNC: 32.5 G/DL (ref 31.4–37.4)
MCV RBC AUTO: 96 FL (ref 82–98)
MONOCYTES # BLD AUTO: 0.79 THOUSAND/ÂΜL (ref 0.17–1.22)
MONOCYTES NFR BLD AUTO: 10 % (ref 4–12)
NEUTROPHILS # BLD AUTO: 6.06 THOUSANDS/ÂΜL (ref 1.85–7.62)
NEUTS SEG NFR BLD AUTO: 78 % (ref 43–75)
NRBC BLD AUTO-RTO: 0 /100 WBCS
PLATELET # BLD AUTO: 269 THOUSANDS/UL (ref 149–390)
PMV BLD AUTO: 10.9 FL (ref 8.9–12.7)
POTASSIUM SERPL-SCNC: 3.9 MMOL/L (ref 3.5–5.3)
PROT SERPL-MCNC: 7.2 G/DL (ref 6.4–8.4)
RBC # BLD AUTO: 2.99 MILLION/UL (ref 3.81–5.12)
SODIUM SERPL-SCNC: 137 MMOL/L (ref 135–147)
WBC # BLD AUTO: 7.74 THOUSAND/UL (ref 4.31–10.16)

## 2025-04-16 PROCEDURE — 80053 COMPREHEN METABOLIC PANEL: CPT | Performed by: INTERNAL MEDICINE

## 2025-04-16 PROCEDURE — 96375 TX/PRO/DX INJ NEW DRUG ADDON: CPT

## 2025-04-16 PROCEDURE — 83735 ASSAY OF MAGNESIUM: CPT | Performed by: INTERNAL MEDICINE

## 2025-04-16 PROCEDURE — G0498 CHEMO EXTEND IV INFUS W/PUMP: HCPCS

## 2025-04-16 PROCEDURE — 85025 COMPLETE CBC W/AUTO DIFF WBC: CPT | Performed by: INTERNAL MEDICINE

## 2025-04-16 PROCEDURE — 96367 TX/PROPH/DG ADDL SEQ IV INF: CPT

## 2025-04-16 PROCEDURE — 96368 THER/DIAG CONCURRENT INF: CPT

## 2025-04-16 PROCEDURE — 96413 CHEMO IV INFUSION 1 HR: CPT

## 2025-04-16 RX ORDER — ATROPINE SULFATE 1 MG/ML
0.25 INJECTION, SOLUTION INTRAVENOUS ONCE AS NEEDED
Status: DISCONTINUED | OUTPATIENT
Start: 2025-04-16 | End: 2025-04-19 | Stop reason: HOSPADM

## 2025-04-16 RX ORDER — GABAPENTIN 300 MG/1
CAPSULE ORAL
Qty: 120 CAPSULE | Refills: 0 | Status: SHIPPED | OUTPATIENT
Start: 2025-04-16

## 2025-04-16 RX ORDER — SODIUM CHLORIDE 9 MG/ML
20 INJECTION, SOLUTION INTRAVENOUS ONCE
Status: COMPLETED | OUTPATIENT
Start: 2025-04-16 | End: 2025-04-16

## 2025-04-16 RX ORDER — ATROPINE SULFATE 1 MG/ML
0.25 INJECTION, SOLUTION INTRAVENOUS ONCE
Status: COMPLETED | OUTPATIENT
Start: 2025-04-16 | End: 2025-04-16

## 2025-04-16 RX ADMIN — MAGNESIUM SULFATE HEPTAHYDRATE: 500 INJECTION, SOLUTION INTRAMUSCULAR; INTRAVENOUS at 12:16

## 2025-04-16 RX ADMIN — IRINOTECAN HYDROCHLORIDE 160 MG: 20 INJECTION, SOLUTION INTRAVENOUS at 12:12

## 2025-04-16 RX ADMIN — ATROPINE SULFATE 0.25 MG: 1 INJECTION, SOLUTION INTRAVENOUS at 12:09

## 2025-04-16 RX ADMIN — SODIUM CHLORIDE 20 ML/HR: 0.9 INJECTION, SOLUTION INTRAVENOUS at 11:44

## 2025-04-16 RX ADMIN — DEXAMETHASONE SODIUM PHOSPHATE: 10 INJECTION, SOLUTION INTRAMUSCULAR; INTRAVENOUS at 11:44

## 2025-04-16 NOTE — PROGRESS NOTES
Pt tolerated treatment today without incident.  Pt connected to BRANDIE as ordered.  Pt instructed to return to infusion center on Friday at 12:30 for BRANDIE disconnect.  Pt was provided with AVS and is aware of this appt.

## 2025-04-16 NOTE — PATIENT INSTRUCTIONS
Nutrition Rx & Recommendations:  Diet: enteral nutrition as 1' source of nutrition  Stay hydrated by sipping fluids of choice/tolerance throughout the day.   Follow proper oral care; Try baking soda/salt water rinse recipe (mix 3/4 tsp salt + 1 tsp baking soda + 1 qt water; rinse with plain water after using) in Eating Hints book (pg 18).  Brush your teeth before/after meals & before bed.  Weigh yourself regularly. If you notice weight loss, make an effort to increase your daily food/calorie intake. If you continue to notice loss after these efforts, reach out to your dietitian to establish a plan to stabilize weight.   Continue with Ensure Plus/Complete 1-2 times daily. Can try drinking orally, or use through PEG if tolerated better  Choose liquids with calories: whole milk, [employer] milk (higher protein/lactose-free milk), chocolate milk, drinkable yogurt, kefir, 100% fruit juice, diluted juice, bone broth (higher protein broth), creamy soups, sports drinks (Gatorade, Poweraide, Pedialyte, etc.), Italian ice, popsicles, milkshakes, smoothies, oral nutrition supplements (Ensure, Boost, etc.), gelatin/Jello, etc.  Soft/easy to swallow foods that are high in calories and protein: casseroles, chicken/egg/tuna salad with extra garcia to add calories and moisture, oatmeal/cream of wheat made with whole milk, cottage cheese, whole milk Greek yogurt, scrambled eggs with cheese, avocado, macaroni and cheese, mashed potatoes made with butter and whole milk or dry milk powder, ground meat with extra sauce/gravy, canned fruit, peanut butter, cream soups (cream of chicken, cream of mushroom, broccoli cheddar), pudding made with whole milk, custard, ice cream, banana, milkshakes/smoothies, Review page 47 of Eating Hints Book for more ideas.   TF plan -   TF Goal:  Continue [employer] 1.5, at lease 2 cartons/day  Contact RD for substitutions  Call [facility] when you have 10 days left of TF supplies: [phone], option [phone]  (customer support).    Follow Up Plan: during infusion on 4/30/25   Recommend Referral to Other Providers: none at this time

## 2025-04-17 ENCOUNTER — OFFICE VISIT (OUTPATIENT)
Dept: SPEECH THERAPY | Facility: CLINIC | Age: 70
End: 2025-04-17
Payer: MEDICARE

## 2025-04-17 DIAGNOSIS — R13.12 DYSPHAGIA, OROPHARYNGEAL PHASE: Primary | ICD-10-CM

## 2025-04-17 DIAGNOSIS — C09.9 RIGHT TONSILLAR SQUAMOUS CELL CARCINOMA (HCC): ICD-10-CM

## 2025-04-17 DIAGNOSIS — Z90.89 STATUS POST TONSILLECTOMY: ICD-10-CM

## 2025-04-17 PROCEDURE — 92526 ORAL FUNCTION THERAPY: CPT | Performed by: SPEECH-LANGUAGE PATHOLOGIST

## 2025-04-17 NOTE — PROGRESS NOTES
Daily Speech Treatment Note    Today's date: 2025   Patient’s name: Maira Moura  : 1955  MRN: [id number]  Safety measures: HTN, CVA, GERD, active CA, s/p PEG tube placement, aspiration precautions  Current recommended diet: PEG tube for primary nutrition/hydration; puree with thin liquids P.O. as tolerated   Referring provider: Harika Medina DO Encounter Diagnosis     ICD-10-CM    1. Dysphagia, oropharyngeal phase  R13.12       2. Status post tonsillectomy  Z90.89       3. 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         Visit Tracking:  POC   Expires Auth Expiration Date ST Visit Limit   25 or 10th visit 2025 [facility]              Visit/Unit Tracking:  Auth Status Date 03/18/25 03/20/25 03/25/25 03/27/25 04/03/25  04/08/25 04/15/25 04/17/25   Not required Used 1 2  3 4 5  6 7 8     Remaining 9 8  7 6  5 4 3 2      Subjective/Behavioral:  -Patient reported that she has been trying to take foods P.O. Patient noted that her appetite has somewhat increased.    Objective/Assessment:    Short-term goals:  Patient will tolerate P.O. trials of her recommended diet with the implementation of safe swallowing strategies without overt s/sx of penetration and/or aspiration during skilled therapy sessions in this certification period (to be achieved in 4-6 weeks). -- PARTIALLY MET    Traditional VitalStim     Channel(s) used: 1, 2   Placement: 3a   Duty Cycle: 57/1 s   Frequency: 80 pulses per second   Phase Duration: 300 microseconds   Treatment Duration: 38 minutes   Min mA level: 7.0 mA   Max mA level: 14.0 mA      Patient tolerated NMES in conjunction with pharyngeal/laryngeal strengthening exercises and P.O. intake. (The patient received instruction on the potential benefits from NMES treatment, including neuromuscular re-education and muscle strengthening.) Skin condition post-NMES: intact.      Patient consumed the following during today’s session: flounder filet and water  (thin liquid) via side of cup -- NMES remained on during pharyngeal strengthening and IOPI exercises (see below).     Patient without any c/o odynophagia again today. Oral containment and bolus formation/control, AP transfer, and swallow initiation were judged to be WFL.  Reduced hyolaryngeal elevation and excursion noted upon palpation. No oral cavity residue was observed. Patient with c/o moderate globus sensation on fish. Patient implemented liquid washes and double swallows, which were not entirely effective in clearing the sensation. Patient was educated on the importance of remaining upright and utilizing multiple swallows to aide with clearing. Cough x2 noted on thin liquid during session trials. Vocal quality remained at baseline status. No other overt s/sx of penetration/aspiration noted during today's session. By end of session, patient expressed that globus sensation cleared.    Patient will perform oral motor exercises using IOPI device on lingual muscles to facilitate increased function and strength by 25% for improved swallowing function (to be achieved in 6-8 weeks). -- PARTIALLY MET    auctionpointI Pro Pmax Data Tracker Grid (from [id number]):       01/09/25 01/14/25 01/23/25 03/20/25   03/25/25  03/27/25 04/03/25 04/08/25 04/15/25 04/17/25   Tongue tip (GOAL = 56) 50 49 49 70  58  61  63 58 58 65   Tongue back (GOAL = 50) 47 54 61 69  62 64 69 63 63 70       IOPI Pro -- Today's Exercise Targets:       Pmax (after 3 trials): Effort level: Target for today's treatment:   Tongue tip  (GOAL = 56) 65 90%    59   Tongue back  (GOAL = 50) 70 90%    63         IOPI Pro -- Exercise Tracker Grid (from [id number]):       01/09/25 01/14/25 01/23/25 03/20/25   03/25/25 03/27/25  04/03/25 04/08/25 04/15/25 04/17/25   Tongue tip 3 sets of 30 reps (pumps)    3 sets of 30 reps (pumps)     Endurance holds (50% of max) x 2; avg 20    3 sets of 30 reps (pumps)     1 sets of 3 reps (20.47 second holds) 4 sets of  30 reps (pumps)  4 sets of 30 reps (pumps) 2 sets of 30 reps (pumps) 2 sets of 30 reps (pumps) 3 sets of 30 reps (pumps) 3 sets of 30 reps (pumps) 3 sets of 30 reps (pumps)   Tongue back 3 sets of 30 reps (pumps)    3 sets of 30 reps (pumps)     Endurance holds (50% of max) x 2; avg 23 sec    3 sets of 30 reps (pumps)     1 sets of 3 reps (16.92 second holds) 4 sets of 30 reps (pumps)  4 sets of 30 reps (pumps) 2 sets of 30 reps (pumps) 2 sets of 30 reps (pumps) 3 sets of 30 reps (pumps) 3 sets of 30 reps (pumps) 3 sets of 30 reps (pumps)     Patient will independently complete pharyngeal strengthening exercises (e.g., Effortful swallow, Mendelsohn, Evelia) daily to facilitate increased pharyngeal strength and coordination (to be achieved in 4-6 weeks).     -Patient completed the following pharyngeal strengthening exercises during today's session:  *Effortful swallow: 1 set of 10 reps  *Mendelsohn: 1 set of 10 reps  *Evelia: 1 set of 10 reps    Plan:  -Patient was provided with home exercises/activities to target goals in plan of care at the end of today's session.  -Continue with current plan of care.

## 2025-04-18 ENCOUNTER — HOSPITAL ENCOUNTER (OUTPATIENT)
Dept: INFUSION CENTER | Facility: CLINIC | Age: 70
End: 2025-04-18
Attending: INTERNAL MEDICINE
Payer: MEDICARE

## 2025-04-18 DIAGNOSIS — C18.9 METASTATIC COLON CANCER TO LIVER (HCC): Primary | ICD-10-CM

## 2025-04-18 DIAGNOSIS — C78.7 METASTATIC COLON CANCER TO LIVER (HCC): Primary | ICD-10-CM

## 2025-04-18 PROCEDURE — 96372 THER/PROPH/DIAG INJ SC/IM: CPT

## 2025-04-18 RX ADMIN — PEGFILGRASTIM 6 MG: 6 INJECTION SUBCUTANEOUS at 12:43

## 2025-04-18 NOTE — ASSESSMENT & PLAN NOTE
Orders:  •  Infusion Calculated Appointment Request; Future  •  CBC and differential; Future  •  Comprehensive metabolic panel; Future  •  Infusion Calculated Appointment Request; Future

## 2025-04-18 NOTE — PLAN OF CARE
Problem: Potential for Falls  Goal: Patient will remain free of falls  Description: INTERVENTIONS:- Educate patient/family on patient safety including physical limitations- Instruct patient to call for assistance with activity - Consult OT/PT to assist with strengthening/mobility - Keep Call bell within reach- Keep bed low and locked with side rails adjusted as appropriate- Keep care items and personal belongings within reach- Initiate and maintain comfort rounds- Make Fall Risk Sign visible to staff

## 2025-04-18 NOTE — PROGRESS NOTES
Patient arrived to the unit and denied complications. Her BRANDIE appears to be empty and was connected for more than 46 hours. Neulasta was administered in her right arm well without complications. Central labs were not drawn because there were no labs ordered for April 18th. Patient is aware to return on 4/21/25.

## 2025-04-18 NOTE — PROGRESS NOTES
Virtual Brief Visit  Name: Maira Moura      : 1955      MRN: [id number]  Encounter Provider: Harika Medina DO  Encounter Date: 4/15/2025   Encounter department: [facility] [place]  :  Assessment & Plan  Metastatic colon cancer to liver (HCC)    Orders:    Infusion Calculated Appointment Request; Future    CBC and differential; Future    Comprehensive metabolic panel; Future    Infusion Calculated Appointment Request; Future    We will proceed with cycle 4 of FOLFIRI since we restarted the treatment.  She is continuing to improve and I am very happy to hear this.  We will cut her back her fluids to  and  only.  I will see her back in 2 weeks for ongoing evaluation.  She knows to call in the interim with any questions or concerns.    Malignant neoplasm of upper-outer quadrant of right breast in female, estrogen receptor positive (HCC)         She remains on anastrozole      History of Present Illness   HPI    70-year-old male with metastatic colon cancer to the liver and locally advanced head neck cancer.  She is due for cycle 4 FOLFIRI.  She is doing well.  Her energy is better.  She feels like she can cut back on her IV fluids.  She is swallowing better and is eating more food.  She ate cereal for the first time and was able to swallow normally.  She has no nausea vomiting diarrhea constipation.    Administrative Statements   Encounter provider Harika Medina DO    The Patient is located at Home and in the following state in which I hold an active license [place].    The patient was identified by name and date of birth. Maira Moura was informed that this is a telemedicine visit and that the visit is being conducted through Telephone.  My office door was closed. No one else was in the room.  She acknowledged consent and understanding of privacy and security of the video platform. The patient has agreed to participate and understands they can  discontinue the visit at any time.    I have spent a total time of 5 minutes in caring for this patient on the day of the visit/encounter including Diagnostic results, Impressions, Counseling / Coordination of care, Documenting in the medical record, Reviewing/placing orders in the medical record (including tests, medications, and/or procedures), and Obtaining or reviewing history  , not including the time spent for establishing the audio/video connection.

## 2025-04-21 ENCOUNTER — HOSPITAL ENCOUNTER (OUTPATIENT)
Dept: INFUSION CENTER | Facility: CLINIC | Age: 70
Discharge: HOME | End: 2025-04-21
Attending: INTERNAL MEDICINE
Payer: MEDICARE

## 2025-04-21 DIAGNOSIS — E86.0 DEHYDRATION: Primary | ICD-10-CM

## 2025-04-21 LAB
ALBUMIN SERPL BCG-MCNC: 3.7 G/DL (ref 3.5–5)
ALP SERPL-CCNC: 226 U/L (ref 34–104)
ALT SERPL W P-5'-P-CCNC: 9 U/L (ref 7–52)
ANION GAP SERPL CALCULATED.3IONS-SCNC: 7 MMOL/L (ref 4–13)
ANISOCYTOSIS BLD QL SMEAR: PRESENT
AST SERPL W P-5'-P-CCNC: 24 U/L (ref 13–39)
BASOPHILS # BLD MANUAL: 0 THOUSAND/UL (ref 0–0.1)
BASOPHILS NFR MAR MANUAL: 0 % (ref 0–1)
BILIRUB SERPL-MCNC: 0.35 MG/DL (ref 0.2–1)
BUN SERPL-MCNC: 28 MG/DL (ref 5–25)
CALCIUM SERPL-MCNC: 9.3 MG/DL (ref 8.4–10.2)
CHLORIDE SERPL-SCNC: 97 MMOL/L (ref 96–108)
CO2 SERPL-SCNC: 29 MMOL/L (ref 21–32)
CREAT SERPL-MCNC: 1.08 MG/DL (ref 0.6–1.3)
DOHLE BOD BLD QL SMEAR: PRESENT
EOSINOPHIL # BLD MANUAL: 0 THOUSAND/UL (ref 0–0.4)
EOSINOPHIL NFR BLD MANUAL: 0 % (ref 0–6)
ERYTHROCYTE [DISTWIDTH] IN BLOOD BY AUTOMATED COUNT: 16.4 % (ref 11.6–15.1)
GFR SERPL CREATININE-BSD FRML MDRD: 52 ML/MIN/1.73SQ M
GLUCOSE SERPL-MCNC: 105 MG/DL (ref 65–140)
HCT VFR BLD AUTO: 27.5 % (ref 34.8–46.1)
HGB BLD-MCNC: 9.1 G/DL (ref 11.5–15.4)
LYMPHOCYTES # BLD AUTO: 0 % (ref 14–44)
LYMPHOCYTES # BLD AUTO: 0 THOUSAND/UL (ref 0.6–4.47)
MAGNESIUM SERPL-MCNC: 2 MG/DL (ref 1.9–2.7)
MCH RBC QN AUTO: 31.6 PG (ref 26.8–34.3)
MCHC RBC AUTO-ENTMCNC: 33.1 G/DL (ref 31.4–37.4)
MCV RBC AUTO: 96 FL (ref 82–98)
MONOCYTES # BLD AUTO: 2.68 THOUSAND/UL (ref 0–1.22)
MONOCYTES NFR BLD: 4 % (ref 4–12)
NEUTROPHILS # BLD MANUAL: 64.37 THOUSAND/UL (ref 1.85–7.62)
NEUTS SEG NFR BLD AUTO: 96 % (ref 43–75)
PLATELET # BLD AUTO: 253 THOUSANDS/UL (ref 149–390)
PLATELET BLD QL SMEAR: ADEQUATE
PLATELET CLUMP BLD QL SMEAR: PRESENT
PMV BLD AUTO: 10.4 FL (ref 8.9–12.7)
POTASSIUM SERPL-SCNC: 3.8 MMOL/L (ref 3.5–5.3)
PROT SERPL-MCNC: 7.3 G/DL (ref 6.4–8.4)
RBC # BLD AUTO: 2.88 MILLION/UL (ref 3.81–5.12)
RBC MORPH BLD: PRESENT
SODIUM SERPL-SCNC: 133 MMOL/L (ref 135–147)
WBC # BLD AUTO: 67.05 THOUSAND/UL (ref 4.31–10.16)

## 2025-04-21 PROCEDURE — 83735 ASSAY OF MAGNESIUM: CPT | Performed by: INTERNAL MEDICINE

## 2025-04-21 PROCEDURE — 80053 COMPREHEN METABOLIC PANEL: CPT | Performed by: INTERNAL MEDICINE

## 2025-04-21 PROCEDURE — 96360 HYDRATION IV INFUSION INIT: CPT

## 2025-04-21 PROCEDURE — 85027 COMPLETE CBC AUTOMATED: CPT | Performed by: INTERNAL MEDICINE

## 2025-04-21 PROCEDURE — 85007 BL SMEAR W/DIFF WBC COUNT: CPT | Performed by: INTERNAL MEDICINE

## 2025-04-21 RX ADMIN — SODIUM CHLORIDE 1000 ML: 0.9 INJECTION, SOLUTION INTRAVENOUS at 14:25

## 2025-04-21 NOTE — PLAN OF CARE
Problem: INFECTION - ADULT  Goal: Absence or prevention of progression during hospitalization  Description: INTERVENTIONS:- Assess and monitor for signs and symptoms of infection- Monitor lab/diagnostic results- Monitor all insertion sites, i.e. indwelling lines, tubes, and drains- Monitor endotracheal if appropriate and nasal secretions for changes in amount and color- [place] appropriate cooling/warming therapies per order- Administer medications as ordered- Instruct and encourage patient and family to use good hand hygiene technique- Identify and instruct in appropriate isolation precautions for identified infection/condition  Outcome: Progressing  Goal: Absence of fever/infection during neutropenic period  Description: INTERVENTIONS:- Monitor WBC  Outcome: Progressing     Problem: Knowledge Deficit  Goal: Patient/family/caregiver demonstrates understanding of disease process, treatment plan, medications, and discharge instructions  Description: Complete learning assessment and assess knowledge base.Interventions:- Provide teaching at level of understanding- Provide teaching via preferred learning methods  Outcome: Progressing

## 2025-04-21 NOTE — PROGRESS NOTES
Pt. Denies new symptoms or concerns today.  Last Magnesium 2.0.  NSS 1000 ml ordered today for infusion with labs.  CBC, CMP and Magnesium obtained as ordered.

## 2025-04-22 ENCOUNTER — APPOINTMENT (OUTPATIENT)
Dept: SPEECH THERAPY | Facility: CLINIC | Age: 70
End: 2025-04-22
Payer: MEDICARE

## 2025-04-22 ENCOUNTER — PATIENT OUTREACH (OUTPATIENT)
Dept: HEMATOLOGY ONCOLOGY | Facility: CLINIC | Age: 70
End: 2025-04-22

## 2025-04-22 NOTE — PROGRESS NOTES
Pt called stating she did not receive a call from  [facility]  for her  time. She is not on the schedule for  transportation for her radiation appointment scheduled today. Message sent to the radiation team

## 2025-04-23 ENCOUNTER — HOSPITAL ENCOUNTER (OUTPATIENT)
Dept: INFUSION CENTER | Facility: CLINIC | Age: 70
Discharge: HOME | End: 2025-04-23
Attending: INTERNAL MEDICINE
Payer: MEDICARE

## 2025-04-23 VITALS
HEART RATE: 91 BPM | SYSTOLIC BLOOD PRESSURE: 105 MMHG | TEMPERATURE: 97.4 F | DIASTOLIC BLOOD PRESSURE: 73 MMHG | RESPIRATION RATE: 18 BRPM

## 2025-04-23 DIAGNOSIS — E86.0 DEHYDRATION: Primary | ICD-10-CM

## 2025-04-23 LAB
ALBUMIN SERPL BCG-MCNC: 3.5 G/DL (ref 3.5–5)
ALP SERPL-CCNC: 191 U/L (ref 34–104)
ALT SERPL W P-5'-P-CCNC: 8 U/L (ref 7–52)
ANION GAP SERPL CALCULATED.3IONS-SCNC: 7 MMOL/L (ref 4–13)
AST SERPL W P-5'-P-CCNC: 20 U/L (ref 13–39)
BASOPHILS # BLD AUTO: 0.16 THOUSANDS/ÂΜL (ref 0–0.1)
BASOPHILS NFR BLD AUTO: 1 % (ref 0–1)
BILIRUB SERPL-MCNC: 0.27 MG/DL (ref 0.2–1)
BUN SERPL-MCNC: 24 MG/DL (ref 5–25)
CALCIUM SERPL-MCNC: 9.3 MG/DL (ref 8.4–10.2)
CHLORIDE SERPL-SCNC: 101 MMOL/L (ref 96–108)
CO2 SERPL-SCNC: 28 MMOL/L (ref 21–32)
CREAT SERPL-MCNC: 0.98 MG/DL (ref 0.6–1.3)
EOSINOPHIL # BLD AUTO: 0.2 THOUSAND/ÂΜL (ref 0–0.61)
EOSINOPHIL NFR BLD AUTO: 1 % (ref 0–6)
ERYTHROCYTE [DISTWIDTH] IN BLOOD BY AUTOMATED COUNT: 16.7 % (ref 11.6–15.1)
GFR SERPL CREATININE-BSD FRML MDRD: 59 ML/MIN/1.73SQ M
GLUCOSE SERPL-MCNC: 99 MG/DL (ref 65–140)
HCT VFR BLD AUTO: 27.1 % (ref 34.8–46.1)
HGB BLD-MCNC: 8.9 G/DL (ref 11.5–15.4)
IMM GRANULOCYTES # BLD AUTO: 0.19 THOUSAND/UL (ref 0–0.2)
IMM GRANULOCYTES NFR BLD AUTO: 1 % (ref 0–2)
LYMPHOCYTES # BLD AUTO: 0.95 THOUSANDS/ÂΜL (ref 0.6–4.47)
LYMPHOCYTES NFR BLD AUTO: 5 % (ref 14–44)
MAGNESIUM SERPL-MCNC: 2 MG/DL (ref 1.9–2.7)
MCH RBC QN AUTO: 31.6 PG (ref 26.8–34.3)
MCHC RBC AUTO-ENTMCNC: 32.8 G/DL (ref 31.4–37.4)
MCV RBC AUTO: 96 FL (ref 82–98)
MONOCYTES # BLD AUTO: 1.55 THOUSAND/ÂΜL (ref 0.17–1.22)
MONOCYTES NFR BLD AUTO: 9 % (ref 4–12)
NEUTROPHILS # BLD AUTO: 14.85 THOUSANDS/ÂΜL (ref 1.85–7.62)
NEUTS SEG NFR BLD AUTO: 83 % (ref 43–75)
NRBC BLD AUTO-RTO: 0 /100 WBCS
PLATELET # BLD AUTO: 238 THOUSANDS/UL (ref 149–390)
PMV BLD AUTO: 10.5 FL (ref 8.9–12.7)
POTASSIUM SERPL-SCNC: 4 MMOL/L (ref 3.5–5.3)
PROT SERPL-MCNC: 7.1 G/DL (ref 6.4–8.4)
RBC # BLD AUTO: 2.82 MILLION/UL (ref 3.81–5.12)
SODIUM SERPL-SCNC: 136 MMOL/L (ref 135–147)
WBC # BLD AUTO: 17.9 THOUSAND/UL (ref 4.31–10.16)

## 2025-04-23 PROCEDURE — 85025 COMPLETE CBC W/AUTO DIFF WBC: CPT | Performed by: INTERNAL MEDICINE

## 2025-04-23 PROCEDURE — 96360 HYDRATION IV INFUSION INIT: CPT

## 2025-04-23 PROCEDURE — 80053 COMPREHEN METABOLIC PANEL: CPT | Performed by: INTERNAL MEDICINE

## 2025-04-23 PROCEDURE — 83735 ASSAY OF MAGNESIUM: CPT | Performed by: INTERNAL MEDICINE

## 2025-04-23 RX ADMIN — SODIUM CHLORIDE 1000 ML: 0.9 INJECTION, SOLUTION INTRAVENOUS at 12:44

## 2025-04-23 NOTE — PROGRESS NOTES
Patient tolerated her hydration well without adverse reaction. Patient is aware to return on Monday.

## 2025-04-23 NOTE — PLAN OF CARE
Problem: INFECTION - ADULT  Goal: Absence or prevention of progression during hospitalization  Description: INTERVENTIONS:- Assess and monitor for signs and symptoms of infection- Monitor lab/diagnostic results- Monitor all insertion sites, i.e. indwelling lines, tubes, and drains- Monitor endotracheal if appropriate and nasal secretions for changes in amount and color- [place] appropriate cooling/warming therapies per order- Administer medications as ordered- Instruct and encourage patient and family to use good hand hygiene technique- Identify and instruct in appropriate isolation precautions for identified infection/condition  Outcome: Progressing

## 2025-04-23 NOTE — PROGRESS NOTES
Daily Speech Treatment Note    Today's date: 2025  Patient’s name: Maira Moura  : 1955  MRN: [id number]  Safety measures: HTN, CVA, GERD, active CA, s/p PEG tube placement, aspiration precautions  Current recommended diet: PEG tube for primary nutrition/hydration; puree with thin liquids P.O. as tolerated   Referring provider: Harika Medina DO Encounter Diagnosis     ICD-10-CM    1. Dysphagia, oropharyngeal phase  R13.12       2. Status post tonsillectomy  Z90.89       3. 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         Visit Tracking:  POC   Expires Auth Expiration Date ST Visit Limit   25 or 10th visit 2025 [facility]              Visit/Unit Tracking:  Auth Status Date 03/18/25 03/20/25 03/25/25 03/27/25 04/03/25  04/08/25 04/15/25 04/17/25 04/24/25   Not required Used 1 2  3 4 5  6 7 8 9     Remaining 9 8  7 6  5 4 3 2 1      Subjective/Behavioral:  -Patient reported that she consumed Rice Krispies with milk prior to today's appointment.    -Patient stated that she tried cooked cauliflower mixed with mashed potatoes. No reported difficulties.    Objective/Assessment:    Short-term goals:  Patient will tolerate P.O. trials of her recommended diet with the implementation of safe swallowing strategies without overt s/sx of penetration and/or aspiration during skilled therapy sessions in this certification period (to be achieved in 4-6 weeks). -- PARTIALLY MET    Traditional VitalStim     Channel(s) used: 1, 2   Placement: 3a   Duty Cycle: 57/1 s   Frequency: 80 pulses per second   Phase Duration: 300 microseconds   Treatment Duration: 38 minutes   Min mA level: 8.0 mA   Max mA level: 10.0 mA      Patient tolerated NMES in conjunction with pharyngeal/laryngeal strengthening exercises and P.O. intake. (The patient received instruction on the potential benefits from NMES treatment, including neuromuscular re-education and muscle strengthening.) Skin condition post-NMES:  intact.      Patient consumed the following during today’s session: egg salad and water (thin liquid) via side of cup -- NMES remained on during pharyngeal strengthening and IOPI exercises (see below).     Patient consumed about half of her egg salad. Patient reported that she should have added a little more mayonnaise to the egg salad as it was dry.    Oral containment and bolus formation/control, AP transfer, and swallow initiation were judged to be WFL.  Reduced hyolaryngeal elevation and excursion noted upon palpation. No oral cavity residue was observed. Patient with c/o moderate globus sensation and implemented liquid washes with double swallows to clear the sensation. Throat clear x1 noted on thin liquid during session trials. Vocal quality remained at baseline status. No other overt s/sx of penetration/aspiration noted during today's session.    Patient will perform oral motor exercises using IOPI device on lingual muscles to facilitate increased function and strength by 25% for improved swallowing function (to be achieved in 6-8 weeks). -- PARTIALLY MET    Patient will independently complete pharyngeal strengthening exercises (e.g., Effortful swallow, Mendelsohn, Evelia) daily to facilitate increased pharyngeal strength and coordination (to be achieved in 4-6 weeks).     -Patient completed the following pharyngeal strengthening exercises during today's session:   *Effortful swallow: 2 sets of 10 reps  *Mendelsohn: 2 sets of 10 reps  *Evelia: 1 set of 10 reps    Plan:  -Patient was provided with home exercises/activities to target goals in plan of care at the end of today's session.  -Continue with current plan of care.

## 2025-04-24 ENCOUNTER — OFFICE VISIT (OUTPATIENT)
Dept: SPEECH THERAPY | Facility: CLINIC | Age: 70
End: 2025-04-24
Payer: MEDICARE

## 2025-04-24 DIAGNOSIS — Z90.89 STATUS POST TONSILLECTOMY: ICD-10-CM

## 2025-04-24 DIAGNOSIS — R13.12 DYSPHAGIA, OROPHARYNGEAL PHASE: Primary | ICD-10-CM

## 2025-04-24 DIAGNOSIS — C09.9 RIGHT TONSILLAR SQUAMOUS CELL CARCINOMA (HCC): ICD-10-CM

## 2025-04-24 PROCEDURE — 92526 ORAL FUNCTION THERAPY: CPT | Performed by: SPEECH-LANGUAGE PATHOLOGIST

## 2025-04-25 DIAGNOSIS — C18.9 METASTATIC COLON CANCER TO LIVER (HCC): Primary | ICD-10-CM

## 2025-04-25 DIAGNOSIS — C78.7 METASTATIC COLON CANCER TO LIVER (HCC): Primary | ICD-10-CM

## 2025-04-28 ENCOUNTER — HOSPITAL ENCOUNTER (OUTPATIENT)
Dept: INFUSION CENTER | Facility: CLINIC | Age: 70
Discharge: HOME | End: 2025-04-28
Attending: INTERNAL MEDICINE
Payer: MEDICARE

## 2025-04-28 VITALS
SYSTOLIC BLOOD PRESSURE: 122 MMHG | DIASTOLIC BLOOD PRESSURE: 77 MMHG | TEMPERATURE: 96.9 F | HEART RATE: 99 BPM | RESPIRATION RATE: 18 BRPM

## 2025-04-28 DIAGNOSIS — E86.0 DEHYDRATION: Primary | ICD-10-CM

## 2025-04-28 LAB
ALBUMIN SERPL BCG-MCNC: 3.5 G/DL (ref 3.5–5)
ALP SERPL-CCNC: 135 U/L (ref 34–104)
ALT SERPL W P-5'-P-CCNC: 6 U/L (ref 7–52)
ANION GAP SERPL CALCULATED.3IONS-SCNC: 7 MMOL/L (ref 4–13)
AST SERPL W P-5'-P-CCNC: 19 U/L (ref 13–39)
BASOPHILS # BLD AUTO: 0.08 THOUSANDS/ÂΜL (ref 0–0.1)
BASOPHILS NFR BLD AUTO: 1 % (ref 0–1)
BILIRUB SERPL-MCNC: 0.27 MG/DL (ref 0.2–1)
BUN SERPL-MCNC: 30 MG/DL (ref 5–25)
CALCIUM SERPL-MCNC: 9.2 MG/DL (ref 8.4–10.2)
CHLORIDE SERPL-SCNC: 99 MMOL/L (ref 96–108)
CO2 SERPL-SCNC: 29 MMOL/L (ref 21–32)
CREAT SERPL-MCNC: 1.03 MG/DL (ref 0.6–1.3)
EOSINOPHIL # BLD AUTO: 0.23 THOUSAND/ÂΜL (ref 0–0.61)
EOSINOPHIL NFR BLD AUTO: 2 % (ref 0–6)
ERYTHROCYTE [DISTWIDTH] IN BLOOD BY AUTOMATED COUNT: 16.9 % (ref 11.6–15.1)
GFR SERPL CREATININE-BSD FRML MDRD: 55 ML/MIN/1.73SQ M
GLUCOSE SERPL-MCNC: 98 MG/DL (ref 65–140)
HCT VFR BLD AUTO: 28.5 % (ref 34.8–46.1)
HGB BLD-MCNC: 9.2 G/DL (ref 11.5–15.4)
IMM GRANULOCYTES # BLD AUTO: 0.05 THOUSAND/UL (ref 0–0.2)
IMM GRANULOCYTES NFR BLD AUTO: 1 % (ref 0–2)
LYMPHOCYTES # BLD AUTO: 0.85 THOUSANDS/ÂΜL (ref 0.6–4.47)
LYMPHOCYTES NFR BLD AUTO: 8 % (ref 14–44)
MAGNESIUM SERPL-MCNC: 2 MG/DL (ref 1.9–2.7)
MCH RBC QN AUTO: 31.7 PG (ref 26.8–34.3)
MCHC RBC AUTO-ENTMCNC: 32.3 G/DL (ref 31.4–37.4)
MCV RBC AUTO: 98 FL (ref 82–98)
MONOCYTES # BLD AUTO: 1.03 THOUSAND/ÂΜL (ref 0.17–1.22)
MONOCYTES NFR BLD AUTO: 9 % (ref 4–12)
NEUTROPHILS # BLD AUTO: 8.81 THOUSANDS/ÂΜL (ref 1.85–7.62)
NEUTS SEG NFR BLD AUTO: 79 % (ref 43–75)
NRBC BLD AUTO-RTO: 0 /100 WBCS
PLATELET # BLD AUTO: 287 THOUSANDS/UL (ref 149–390)
PMV BLD AUTO: 10.6 FL (ref 8.9–12.7)
POTASSIUM SERPL-SCNC: 4.2 MMOL/L (ref 3.5–5.3)
PROT SERPL-MCNC: 7.1 G/DL (ref 6.4–8.4)
RBC # BLD AUTO: 2.9 MILLION/UL (ref 3.81–5.12)
SODIUM SERPL-SCNC: 135 MMOL/L (ref 135–147)
WBC # BLD AUTO: 11.05 THOUSAND/UL (ref 4.31–10.16)

## 2025-04-28 PROCEDURE — 83735 ASSAY OF MAGNESIUM: CPT | Performed by: INTERNAL MEDICINE

## 2025-04-28 PROCEDURE — 96360 HYDRATION IV INFUSION INIT: CPT

## 2025-04-28 PROCEDURE — 85025 COMPLETE CBC W/AUTO DIFF WBC: CPT | Performed by: INTERNAL MEDICINE

## 2025-04-28 PROCEDURE — 80053 COMPREHEN METABOLIC PANEL: CPT | Performed by: INTERNAL MEDICINE

## 2025-04-28 RX ADMIN — SODIUM CHLORIDE 1000 ML: 0.9 INJECTION, SOLUTION INTRAVENOUS at 14:01

## 2025-04-28 NOTE — PROGRESS NOTES
Patient arrived to unit without complaint. Patient had central labs drawn and tolerated IV hydration without incident. AVS provided and patient aware of next appointment on 4/30/25 at 12:30. Patient left in stable condition.

## 2025-04-29 ENCOUNTER — TELEPHONE (OUTPATIENT)
Dept: HEMATOLOGY ONCOLOGY | Facility: CLINIC | Age: 70
End: 2025-04-29

## 2025-04-29 ENCOUNTER — TELEMEDICINE (OUTPATIENT)
Dept: HEMATOLOGY ONCOLOGY | Facility: CLINIC | Age: 70
End: 2025-04-29
Payer: MEDICARE

## 2025-04-29 ENCOUNTER — EVALUATION (OUTPATIENT)
Dept: SPEECH THERAPY | Facility: CLINIC | Age: 70
End: 2025-04-29
Attending: INTERNAL MEDICINE
Payer: MEDICARE

## 2025-04-29 DIAGNOSIS — C78.7 METASTATIC COLON CANCER TO LIVER (HCC): Primary | ICD-10-CM

## 2025-04-29 DIAGNOSIS — C09.9 RIGHT TONSILLAR SQUAMOUS CELL CARCINOMA (HCC): ICD-10-CM

## 2025-04-29 DIAGNOSIS — C18.9 METASTATIC COLON CANCER TO LIVER (HCC): Primary | ICD-10-CM

## 2025-04-29 DIAGNOSIS — Z17.0 MALIGNANT NEOPLASM OF UPPER-OUTER QUADRANT OF RIGHT BREAST IN FEMALE, ESTROGEN RECEPTOR POSITIVE (HCC): ICD-10-CM

## 2025-04-29 DIAGNOSIS — Z90.89 STATUS POST TONSILLECTOMY: Primary | ICD-10-CM

## 2025-04-29 DIAGNOSIS — C50.411 MALIGNANT NEOPLASM OF UPPER-OUTER QUADRANT OF RIGHT BREAST IN FEMALE, ESTROGEN RECEPTOR POSITIVE (HCC): ICD-10-CM

## 2025-04-29 DIAGNOSIS — R13.12 DYSPHAGIA, OROPHARYNGEAL PHASE: ICD-10-CM

## 2025-04-29 PROCEDURE — 92526 ORAL FUNCTION THERAPY: CPT

## 2025-04-29 PROCEDURE — 99212 OFFICE O/P EST SF 10 MIN: CPT

## 2025-04-29 RX ORDER — ONDANSETRON 8 MG/1
8 TABLET, FILM COATED ORAL EVERY 8 HOURS PRN
Qty: 90 TABLET | Refills: 2 | Status: SHIPPED | OUTPATIENT
Start: 2025-04-29

## 2025-04-29 RX ORDER — PROCHLORPERAZINE MALEATE 10 MG
10 TABLET ORAL EVERY 6 HOURS PRN
Qty: 90 TABLET | Refills: 2 | Status: SHIPPED | OUTPATIENT
Start: 2025-04-29

## 2025-04-29 NOTE — PROGRESS NOTES
Speech-Language Pathology Re-Evaluation    Today's date: 2025   Patient’s name: Maira Moura  : 1955  MRN: [id number]  Safety measures: HTN, CVA, GERD, active CA, s/p PEG tube placement, aspiration precautions  Current recommended diet: PEG tube for primary nutrition/hydration; puree with thin liquids P.O. as tolerated   Referring provider: Harika Medina DO Encounter Diagnosis     ICD-10-CM    1. Status post tonsillectomy  Z90.89       2. 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 Dysphagia, oropharyngeal phase  R13.12           Assessment: Patient presents improving oropharygeal dysphagia s/p HNCA treatment. Patient has been tolerating PO trials/meals 3x/day along with tube feedings. She is tolerating puree and moist mechanical soft foods. She reports globus sensation with more dense foods most likely due to decreased movement in hyolaryngeal region as a result of radiation cancer treatment. She is also limited by xerostomia, Dysgeusia (improving) and reduced appetite. -  Patient presented today with 0/10 pain. Patient would continue to benefit from outpatient skilled Speech Therapy services for further education/training on safe swallowing strategies & aspiration precautions, for continued assessment of patient's tolerance of the safest, least restrictive diet, for therapeutic exercises, to facilitate overall improved quality of life, and for instruction on HEP.     Recommending a repeat VBSS at this time as patient continues to increase her PO and textures.    -Safety concerns: Risk for aspiration, Risk for choking, and Risk for inadequate nutrition/ hydration    -Risk factors: History of Head and Neck Cancer, Complex medical history, and GERD    Patient expressed burden today with the copay for her feeding tube supplies. She was provided with donated supplies (Jevity 1.2, Osmolite 1.5 and ProSource plus (Liquid Protein).       SWALLOWING SAFETY PRECAUTIONS:   PEG  tube for primary nutrition/hydration    -Recommended solids: Mechanical Soft    -Recommended liquids: Thin    -Recommended medication form: Crushed or whole with puree (as tolerated) or or via PEG (*consult with physician to discuss if medication form can be altered)    -Frequent/thorough oral care    -Aspiration precautions  *Monitor for signs/symptoms concerning for aspiration (e.g., low grade fever, increase in WBC, change in chest x-ray, increased congestion, increased coughing with P.O. intake)    -Reflux precautions    -Strategies: Small sips and bites when eating, Slow rate, swallow between bites, and Alternate liquids and solids    -Positioning: Upright position during meals and Upright position at least 30 minutes after P.O. intake    -Compensatory strategies: Effortful swallow and Multiple swallows    -Supervision: Independent     -Referrals: none at this time      Short-term goals:  Patient will receive a videofluoroscopic swallow study (VFSS) to assess her current swallowing function to r/o penetration or aspiration, to determine the safest, least restrictive diet, and to recommended the appropriate rehabilitation exercises (to be achieved in 2-3 weeks). --patient had VBSS 1/13/25 - recommending a repeat VBSS as patient is now tolerating more PO textures and is motivated to get off of her feeding tube    Patient will tolerate P.O. trials of her recommended diet with the implementation of safe swallowing strategies without overt s/sx of penetration and/or aspiration during skilled therapy sessions in this certification period (to be achieved in 4-6 weeks). -- PARTIALLY MET - ongoing    Patient will perform oral motor exercises using IOPI device on lingual muscles to facilitate increased function and strength by 25% for improved swallowing function (to be achieved in 6-8 weeks). -- MET/DC GOAL - scores are WNL    Patient will independently complete pharyngeal strengthening exercises (e.g., Effortful  "swallow, Mendelsohn, Masako) daily to facilitate increased pharyngeal strength and coordination (to be achieved in 4-6 weeks). -- PARTIALLY MET - completes 1-2 times a day    Long-term goals:  Patient will maintain adequate nutrition and hydration with optimum safety and efficacy of swallowing function on P.O. intake without overt s/sx of penetration or aspiration (to be achieved by discharge). -- PARTIALLY MET    Patient will independently utilize compensatory strategies with optimum safety and efficacy of swallowing function on P.O. intake without overt s/sx of penetration or aspiration (to be achieved by discharge). -- PARTIALLY MET      Plan:   Patient would benefit from outpatient skilled Speech Therapy services: Dysphagia therapy (Due to patient's history of dysphagia, she may benefit from neuromuscular electrical stimulation (NMES) (i.e., VitalStim) treatment in conjunction with pharyngeal/laryngeal strengthening exercises and P.O. intake, unless otherwise contraindicated.)    Frequency: 2x weekly  Duration: 3 months    Intervention certification from: 4/29/2025  Intervention certification to: 07/29/2025      Subjective: Patient reports she has chicken lesan (1 jeannine) yesterday. Reports it got \"stuck\" but she washed it down without an issue.     UPDATE:   Not feeling much pain, unless its a sore throat  \"I get sore throats every night\"  Medicine helps with eating without pain  Patient is motivated to get off the feeding tube; she expressed financial burden with co-pay to get supplies    Patient's goal(s): \"to learn how to swallow again\"    Pain: none at this time.      Objective:    Dysphagia UPDATE    Functional Outcome Measurements    Functional Oral Intake Scale (FOIS): 3 - tube supplements with consistent oral intake (improved from 2)    -Eating Assessment Tool (EAT-10) is a self-administered, symptom-specific outcome instrument for dysphagia. It consists of ten statements that a patient rates on a " scale of 0-4, with 0=no problem to 4=severe problem. A score of 3 or more is abnormal.     Patient obtained a score of     04/29/25: 23/40 03/11/25: 32/40 01/06/25: 39/40      -Difficulty swallowing: Solids, Liquids, and Pills     -Current diet (solids): mechanical soft/puree textures 3x/day  -Current diet (liquids): Thin   -Current pill intake method: Takes small pills whole with water, takes larger pills crushed in puree  -Alternative Feeding Method?: PEG tube (taking 3 cans of TF formula/day and 2 ensure via PEG)      Treatment:  Traditional VitalStim     Channel(s) used: 1, 2   Placement: 3a   Duty Cycle: 57/1 s   Frequency: 80 pulses per second   Phase Duration: 300 microseconds   Treatment Duration: 40 minutes   Min mA level: 7.0 mA   Max mA level: 8.0 mA      Patient tolerated NMES in conjunction with pharyngeal/laryngeal strengthening exercises and P.O. intake. (The patient received instruction on the potential benefits from NMES treatment, including neuromuscular re-education and muscle strengthening.) Skin condition post-NMES: intact.      Patient consumed the following during today’s session: pudding; and water (thin liquid) via side of cup -- NMES remained on during pharyngeal strengthening exercises (see below).      Patient tolerated puree (pudding) without any problems.     Patient without any c/o odynophagia. Patient indicated that she remembered to take her pain medication today.    Patient appeared to have adequate anterior closure, A to P transfer, bolus formation/control, and swallow initiation.  No other overt s/sx of penetration/aspiration noted during today's session. Patient implemented small bites and slow rate with P.O. intake, as well as liquid washes as needed.       Visit Tracking:  POC   Expires Auth Expiration Date ST Visit Limit   06/11/25 or 10th visit 12/31/2025 [facility]    07/29/25 or 10th visit 12/31/2025 [facility]       Visit/Unit Tracking:  Auth Status Date 03/18/25 03/20/25 03/25/25  03/27/25 04/03/25  04/08/25 04/15/25 04/17/25 04/24/25 04/29/25  RE   Not required Used 1 2  3 4 5  6 7 8 9 10     Remaining 9 8  7 6  5 4 3 2 1 0

## 2025-04-29 NOTE — TELEPHONE ENCOUNTER
Spoke with patient regarding her appt today. I advised her that it doesn't look like [facility] transport was set up for her visit so we can do a virtual visit today. Patient verbalized understanding and is in agreement with the plan.

## 2025-04-29 NOTE — ASSESSMENT & PLAN NOTE
Orders:    ondansetron (ZOFRAN) 8 mg tablet; Take 1 tablet (8 mg total) by mouth every 8 (eight) hours as needed for nausea or vomiting    prochlorperazine (COMPAZINE) 10 mg tablet; Take 1 tablet (10 mg total) by mouth every 6 (six) hours as needed for nausea or vomiting    70-year-old female with metastatic cancer to the liver.  We will proceed with cycle 5 of FOLFIRI at current dose as patient is tolerating fairly well.  We will continue with hydration twice a week on Mondays and Wednesdays.  I will see her back in the office in 2 weeks for ongoing evaluation.  After cycle 6, we will order a CT chest abdomen and pelvis for continued monitoring.   Patient is aware to contact us for any additional questions/concerns or worsening symptoms.

## 2025-04-29 NOTE — PROGRESS NOTES
Virtual Brief Visit  Name: Maira Moura      : 1955      MRN: [id number]  Encounter Provider: JOANN Martin  Encounter Date: 2025   Encounter department: [facility] [place]  :  Assessment & Plan  Metastatic colon cancer to liver (HCC)    Orders:    ondansetron (ZOFRAN) 8 mg tablet; Take 1 tablet (8 mg total) by mouth every 8 (eight) hours as needed for nausea or vomiting    prochlorperazine (COMPAZINE) 10 mg tablet; Take 1 tablet (10 mg total) by mouth every 6 (six) hours as needed for nausea or vomiting    70-year-old female with metastatic cancer to the liver.  We will proceed with cycle 5 of FOLFIRI at current dose as patient is tolerating fairly well.  We will continue with hydration twice a week on  and .  I will see her back in the office in 2 weeks for ongoing evaluation.  After cycle 6, we will order a CT chest abdomen and pelvis for continued monitoring.   Patient is aware to contact us for any additional questions/concerns or worsening symptoms.  Malignant neoplasm of upper-outer quadrant of right breast in female, estrogen receptor positive (HCC)  Patient will continue anastrozole daily.      History of Present Illness   HPI    Maira Moura is a 70-year-old female with metastatic colon cancer to the liver and locally advanced head and neck cancer.  She presents for follow-up prior to cycle 5 of FOLFIRI.  Patient is doing well overall.  She is undergoing speech therapy.      Patient reports she is able to tolerate more solid foods, for example, she had chicken Parmesan yesterday and was able to swallow better.  She had a couple of instances where it was getting stuck however, she was able to get it down with some water.  Patient reports she has cereal/oatmeal for breakfast and lunch and has a soft meat or eggs for dinner  She continues with tube feeds, 2 per day.  In addition, she is drinking 2 ensures daily.    Patient reports  she is mildly fatigued after treatment however, it is manageable and she has energy to do things.  She has constipation and has a bowel movement every other day.  She does not take any medications for her constipation as long as she is having a bowel movement every other day.  Her neuropathy is stable and has not increased.  Patient has intermittent nausea which is managed by her antiemetics.  Denies any fevers, infections, muscle/joint pain or hot flashes.    Labs reviewed, okay for treatment:  4/28/2025: Hgb 9.2, MCV 98, WBC 11.05, ANC 8.81, platelets 287,000, creatinine 1.05, EGFR 55, alk phos 135, AST 19, ALT 6, mag 2.0      Administrative Statements   Encounter provider JOANN Martin    The Patient is located at Home and in the following state in which I hold an active license [place].    The patient was identified by name and date of birth. Maira Moura was informed that this is a telemedicine visit and that the visit is being conducted through the [employer] Teams platform. She agrees to proceed..  My office door was closed. No one else was in the room.  She acknowledged consent and understanding of privacy and security of the video platform. The patient has agreed to participate and understands they can discontinue the visit at any time.    I have spent a total time of 15 minutes in caring for this patient on the day of the visit/encounter including chart review, counseling, coordination of care, and documentation;  not including the time spent for establishing the audio/video connection.

## 2025-04-29 NOTE — TELEPHONE ENCOUNTER
Spoke with pt regarding follow up to virtual appt today.  We scheduled the Barium Swallow for 5/29 @ 9:30 AM.  STAR is being scheduled.

## 2025-04-30 ENCOUNTER — OFFICE VISIT (OUTPATIENT)
Dept: PALLIATIVE MEDICINE | Facility: CLINIC | Age: 70
End: 2025-04-30
Payer: MEDICARE

## 2025-04-30 ENCOUNTER — TELEPHONE (OUTPATIENT)
Dept: PALLIATIVE MEDICINE | Facility: CLINIC | Age: 70
End: 2025-04-30

## 2025-04-30 ENCOUNTER — NUTRITION (OUTPATIENT)
Dept: NUTRITION | Facility: CLINIC | Age: 70
End: 2025-04-30

## 2025-04-30 ENCOUNTER — TELEPHONE (OUTPATIENT)
Dept: NUTRITION | Facility: CLINIC | Age: 70
End: 2025-04-30

## 2025-04-30 ENCOUNTER — HOSPITAL ENCOUNTER (OUTPATIENT)
Dept: INFUSION CENTER | Facility: CLINIC | Age: 70
Discharge: HOME | End: 2025-04-30
Attending: INTERNAL MEDICINE
Payer: MEDICARE

## 2025-04-30 VITALS
TEMPERATURE: 97 F | HEART RATE: 68 BPM | RESPIRATION RATE: 15 BRPM | OXYGEN SATURATION: 98 % | BODY MASS INDEX: 26 KG/M2 | SYSTOLIC BLOOD PRESSURE: 120 MMHG | DIASTOLIC BLOOD PRESSURE: 68 MMHG | WEIGHT: 161 LBS

## 2025-04-30 VITALS
HEIGHT: 66 IN | DIASTOLIC BLOOD PRESSURE: 69 MMHG | RESPIRATION RATE: 18 BRPM | TEMPERATURE: 97.2 F | BODY MASS INDEX: 26.03 KG/M2 | SYSTOLIC BLOOD PRESSURE: 103 MMHG | WEIGHT: 162 LBS | HEART RATE: 80 BPM

## 2025-04-30 DIAGNOSIS — C18.9 METASTATIC COLON CANCER TO LIVER (HCC): Primary | ICD-10-CM

## 2025-04-30 DIAGNOSIS — C78.7 METASTATIC COLON CANCER TO LIVER (HCC): Primary | ICD-10-CM

## 2025-04-30 DIAGNOSIS — Z51.5 PALLIATIVE CARE ENCOUNTER: ICD-10-CM

## 2025-04-30 DIAGNOSIS — G89.3 CANCER RELATED PAIN: ICD-10-CM

## 2025-04-30 DIAGNOSIS — C18.9 METASTATIC COLON CANCER TO LIVER (HCC): ICD-10-CM

## 2025-04-30 DIAGNOSIS — R63.0 POOR APPETITE: Primary | ICD-10-CM

## 2025-04-30 DIAGNOSIS — Z71.3 NUTRITIONAL COUNSELING: Primary | ICD-10-CM

## 2025-04-30 DIAGNOSIS — E86.0 DEHYDRATION: ICD-10-CM

## 2025-04-30 DIAGNOSIS — C78.7 METASTATIC COLON CANCER TO LIVER (HCC): ICD-10-CM

## 2025-04-30 LAB
ALBUMIN SERPL BCG-MCNC: 3.5 G/DL (ref 3.5–5)
ALP SERPL-CCNC: 116 U/L (ref 34–104)
ALT SERPL W P-5'-P-CCNC: 7 U/L (ref 7–52)
ANION GAP SERPL CALCULATED.3IONS-SCNC: 6 MMOL/L (ref 4–13)
AST SERPL W P-5'-P-CCNC: 22 U/L (ref 13–39)
BASOPHILS # BLD AUTO: 0.04 THOUSANDS/ÂΜL (ref 0–0.1)
BASOPHILS NFR BLD AUTO: 1 % (ref 0–1)
BILIRUB SERPL-MCNC: 0.32 MG/DL (ref 0.2–1)
BUN SERPL-MCNC: 26 MG/DL (ref 5–25)
CALCIUM SERPL-MCNC: 9.4 MG/DL (ref 8.4–10.2)
CHLORIDE SERPL-SCNC: 101 MMOL/L (ref 96–108)
CO2 SERPL-SCNC: 29 MMOL/L (ref 21–32)
CREAT SERPL-MCNC: 1.03 MG/DL (ref 0.6–1.3)
EOSINOPHIL # BLD AUTO: 0.17 THOUSAND/ÂΜL (ref 0–0.61)
EOSINOPHIL NFR BLD AUTO: 2 % (ref 0–6)
ERYTHROCYTE [DISTWIDTH] IN BLOOD BY AUTOMATED COUNT: 16.8 % (ref 11.6–15.1)
GFR SERPL CREATININE-BSD FRML MDRD: 55 ML/MIN/1.73SQ M
GLUCOSE SERPL-MCNC: 82 MG/DL (ref 65–140)
HCT VFR BLD AUTO: 28.2 % (ref 34.8–46.1)
HGB BLD-MCNC: 9.1 G/DL (ref 11.5–15.4)
IMM GRANULOCYTES # BLD AUTO: 0.1 THOUSAND/UL (ref 0–0.2)
IMM GRANULOCYTES NFR BLD AUTO: 1 % (ref 0–2)
LYMPHOCYTES # BLD AUTO: 0.78 THOUSANDS/ÂΜL (ref 0.6–4.47)
LYMPHOCYTES NFR BLD AUTO: 10 % (ref 14–44)
MAGNESIUM SERPL-MCNC: 2.3 MG/DL (ref 1.9–2.7)
MCH RBC QN AUTO: 31.3 PG (ref 26.8–34.3)
MCHC RBC AUTO-ENTMCNC: 32.3 G/DL (ref 31.4–37.4)
MCV RBC AUTO: 97 FL (ref 82–98)
MONOCYTES # BLD AUTO: 0.86 THOUSAND/ÂΜL (ref 0.17–1.22)
MONOCYTES NFR BLD AUTO: 11 % (ref 4–12)
NEUTROPHILS # BLD AUTO: 6.1 THOUSANDS/ÂΜL (ref 1.85–7.62)
NEUTS SEG NFR BLD AUTO: 75 % (ref 43–75)
NRBC BLD AUTO-RTO: 0 /100 WBCS
PLATELET # BLD AUTO: 282 THOUSANDS/UL (ref 149–390)
PMV BLD AUTO: 10.7 FL (ref 8.9–12.7)
POTASSIUM SERPL-SCNC: 4.3 MMOL/L (ref 3.5–5.3)
PROT SERPL-MCNC: 7.2 G/DL (ref 6.4–8.4)
RBC # BLD AUTO: 2.91 MILLION/UL (ref 3.81–5.12)
SODIUM SERPL-SCNC: 136 MMOL/L (ref 135–147)
WBC # BLD AUTO: 8.05 THOUSAND/UL (ref 4.31–10.16)

## 2025-04-30 PROCEDURE — G2211 COMPLEX E/M VISIT ADD ON: HCPCS | Performed by: INTERNAL MEDICINE

## 2025-04-30 PROCEDURE — 85025 COMPLETE CBC W/AUTO DIFF WBC: CPT | Performed by: INTERNAL MEDICINE

## 2025-04-30 PROCEDURE — 99214 OFFICE O/P EST MOD 30 MIN: CPT | Performed by: INTERNAL MEDICINE

## 2025-04-30 PROCEDURE — 96375 TX/PRO/DX INJ NEW DRUG ADDON: CPT

## 2025-04-30 PROCEDURE — 96413 CHEMO IV INFUSION 1 HR: CPT

## 2025-04-30 PROCEDURE — 96367 TX/PROPH/DG ADDL SEQ IV INF: CPT

## 2025-04-30 PROCEDURE — G0498 CHEMO EXTEND IV INFUS W/PUMP: HCPCS

## 2025-04-30 PROCEDURE — 80053 COMPREHEN METABOLIC PANEL: CPT | Performed by: INTERNAL MEDICINE

## 2025-04-30 PROCEDURE — 83735 ASSAY OF MAGNESIUM: CPT | Performed by: INTERNAL MEDICINE

## 2025-04-30 RX ORDER — ATROPINE SULFATE 1 MG/ML
0.25 INJECTION, SOLUTION INTRAVENOUS ONCE AS NEEDED
Status: DISCONTINUED | OUTPATIENT
Start: 2025-04-30 | End: 2025-05-03 | Stop reason: HOSPADM

## 2025-04-30 RX ORDER — ATROPINE SULFATE 1 MG/ML
0.25 INJECTION, SOLUTION INTRAVENOUS ONCE
Status: COMPLETED | OUTPATIENT
Start: 2025-04-30 | End: 2025-04-30

## 2025-04-30 RX ORDER — SODIUM CHLORIDE 9 MG/ML
20 INJECTION, SOLUTION INTRAVENOUS ONCE
Status: COMPLETED | OUTPATIENT
Start: 2025-04-30 | End: 2025-04-30

## 2025-04-30 RX ORDER — DRONABINOL 2.5 MG/1
2.5 CAPSULE ORAL DAILY
Qty: 5 CAPSULE | Refills: 0 | Status: SHIPPED | OUTPATIENT
Start: 2025-04-30

## 2025-04-30 RX ADMIN — SODIUM CHLORIDE 20 ML/HR: 0.9 INJECTION, SOLUTION INTRAVENOUS at 12:38

## 2025-04-30 RX ADMIN — IRINOTECAN HYDROCHLORIDE 160 MG: 20 INJECTION, SOLUTION INTRAVENOUS at 13:34

## 2025-04-30 RX ADMIN — SODIUM CHLORIDE 1000 ML: 0.9 INJECTION, SOLUTION INTRAVENOUS at 12:43

## 2025-04-30 RX ADMIN — ATROPINE SULFATE 0.25 MG: 1 INJECTION, SOLUTION INTRAVENOUS at 13:29

## 2025-04-30 RX ADMIN — DEXAMETHASONE SODIUM PHOSPHATE: 10 INJECTION, SOLUTION INTRAMUSCULAR; INTRAVENOUS at 12:46

## 2025-04-30 NOTE — TELEPHONE ENCOUNTER
Called  Maira regarding today's scheduled RD follow up appt. Asked if we could change appt to virtual d/t change in provider's schedule. She was agreeable. Will call her during her infusion today for virtual appointment.

## 2025-04-30 NOTE — PROGRESS NOTES
Patient tolerated camptosar and hydration  today without incident. Labs collected per order. Patient  connected to 5FU BRANDIE as ordered. Patient instructed to return to infusion center on Friday at 1:30pm for BRANDIE disconnect. AVS declined by patient. Patient is aware of appointment 5/2/25 at 1:30pm.

## 2025-04-30 NOTE — TELEPHONE ENCOUNTER
PA for DRONABINOL 2.5 MG SUBMITTED to [facility]    via    [x]CMM-KEY: [id number]      [x]PA sent as URGENT    All office notes, labs and other pertaining documents and studies sent. Clinical questions answered. Awaiting determination from insurance company.     Turnaround time for your insurance to make a decision on your Prior Authorization can take 7-21 business days.

## 2025-04-30 NOTE — TELEPHONE ENCOUNTER
Prior Authorization [NEEDED] for [Dronabinol 2.5mg capsules]    KEY# [id number]    Pharmacy: [facility]  Phone: [phone]  Fax: [phone]

## 2025-04-30 NOTE — PROGRESS NOTES
Name: Maira Moura      : 1955      MRN: [id number]  Encounter Provider: Ariadne Fontana MD  Encounter Date: 2025   Encounter department: [facility] PALLIATIVE [facility] [place]  :  Assessment & Plan  Poor appetite  Trial low dose dronabinol 2.5mg OD am. Aware that this is a synthetic THC. Aware of SE, stop if not tolerating.     Orders:    dronabinol (MARINOL) 2.5 mg capsule; Take 1 capsule (2.5 mg total) by mouth in the morning    Metastatic colon cancer to liver (HCC)  Latest repeat CT on 2025 showed interval progression of hepatic metastatic disease with increase in size and number of lesions; slight increase in size of a right lower lobe pulmonary nodule. There are also a few new tiny pulmonary nodules measuring up to 3 mm. This is suspicious for metastatic disease.   She remains on same treatments but with reduced irinotecan and removal of 5-FU given neuropathy - tolerating very well   Orders:    dronabinol (MARINOL) 2.5 mg capsule; Take 1 capsule (2.5 mg total) by mouth in the morning    Palliative care encounter    Orders:    dronabinol (MARINOL) 2.5 mg capsule; Take 1 capsule (2.5 mg total) by mouth in the morning    Cancer related pain  In the neck/throat area, sig better now. Not taking oxy daily anymore  Continue tylenol 160mg/5mL, 20mL (640mg) q6H prn - taking only twice a day. Max of 3000mg in 24H  Continue oxycodone 5mg/5mL, 5mL q6H prn - taking 0-2 a day. No need for fill today  No issues with [facility]       Follow up   RTO in 3 months, call sooner to be seen sooner in the office if needed    Decisional apparatus: Patient is competent on my exam today. If competence is lost, patient's substitute decision maker would default to children by [place] Act 169.     PDMP Review: I have reviewed the patient's controlled substance dispensing history in the Prescription Drug Monitoring Program in compliance with the [facility] regulations before prescribing any controlled  substances.      02/27/202502/27/20251  Oxycodone Hcl 5 Mg/5 Ml Soln  473.0024Ni [id number] h [phone] MMEComm InsPA12/16/202412/16/20241  Oxycodone Hcl 5 Mg/5 Ml Soln  473.0024Ni [id number] h [phone] MMEComm InsPA10/21/202410/21/20241  Oxycodone Hcl 5 Mg/5 Ml Soln  473.0023Ni [id number] h [phone] MMEComm InsPA10/09/202410/09/20241  Oxycodone Hcl 5 Mg/5 Ml Soln  100.005Da [id number] [phone] MMEComm InsPA03/15/202403/15/71714  Oxycodone Hcl (Ir) 5 Mg Tablet  8.004Ja [id number]\'[id number] [phone] MMEMedicarePA       History of Present Illness   Maira Moura is a 70 y.o. 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 She is now s/p chemo and RT for the neck. But now on FOLFIRI since 1/9/2025 due to increased liver mets from colon cancer.    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 Her case was presented at the Multidisciplinary Head and Neck Tumor Board on August 12th, 2024, which culminated in consensus for re-evaluation by Otolaryngology and Radiation Oncology. Patient was recommended definitive chemoradiation. She has was initiated on concurrently chemoradiation with weekly Cisplatin (Dose-reduced from Cisplatin 40 mg/m2 to 30 mg/m2 beginning with Cycle 4, due to poor-tolerance), beginning on September 17th, 2024 (Cycle 1). Patient has since completed six-cycles, thus far (Cycle 6 Day 1 was administered on October 21st,  2024). Cycle 7 of Cisplatin was cancelled, given poor-tolerance of the above-mentioned. Patient is s/p completion of radiation November 6th, 2024. Of note, patient's Oncologic course has been complicated by interval tonsillectomy on October 9th, 2024, resulting in intractable pharyngeal pain in the setting of ongoing chemoradiation. She is s/p IR PEG on 10/1/2024. She was prescribed Oxycodone 5 mg Per Tube Q6HR PRN; of which has not appropriately managed the above-mentioned. Latest PET-CT on 12/27/2024 showed Interval near complete metabolic response to therapy of previously noted hypermetabolic malignancy/metastatic disease involving the neck.    Regarding early-stage hormone-positive right breast cancer, patient is status-post right lumpectomy with sentinel lymph node biopsy on December 20th, 2018, followed by adjuvant radiation (Completed in April 2019). She remains on adjuvant endocrine-therapy with Anastrozole, since February 2019.     Additionally, patient was diagnosed with de naveed metastatic adenocarcinoma of the mid-ascending colon with biopsy-proven oligometastatic disease to the liver in July 2023. Patient is status-post systemic-chemoimmunotherapy, followed by right hemicolectomy due to partial-bowel obstruction in March 2024, followed by resumption of systemic-therapy as well as cryoablation of hepatic metastasis in June 2024. Additional systemic-therapy for colon cancer was held in the absence of residual disease; and in favoring definitive treatment of right tonsillar cancer. PET-CT on 12/27/2024 showed Interval progression of hypermetabolic malignancy/metastatic disease involving the liver. She started FOLFIRI q2 weekly on 1/9/2025. Latest repeat CT on 2/24/2025 showed interval progression of hepatic metastatic disease with increase in size and number of lesions; slight increase in size of a right lower lobe pulmonary nodule. There are also a few new tiny pulmonary nodules measuring up to 3 mm. This  is suspicious for metastatic disease. She remains on same treatments but with reduced irinotecan and removal of 5-FU given neuropathy.    INTERVAL HISTORY:  She is seen today in the office. Since last visit, she continues to have cancer progression in the liver. Tolerating modified chemo better. She no longer has significant pain mainly in the throat area, and now only using oxycodone sparingly, averaging 0-2 doses a day. Denies any new pain. Has poor appetite. Discussed dronabinol closely. No other issues. Doing well otherwise.     Social History     Tobacco Use    Smoking status: Never     Passive exposure: Never    Smokeless tobacco: Never    Tobacco comments:     NO TOBACCO USE   Vaping Use    Vaping status: Never Used   Substance and Sexual Activity    Alcohol use: Never    Drug use: Never    Sexual activity: Not on file        Objective   /68 (BP Location: Right arm, Patient Position: Sitting, Cuff Size: Standard)   Pulse 68   Temp (!) 97 °F (36.1 °C)   Resp 15   Wt 73 kg (161 lb)   SpO2 98%   BMI 26.00 kg/m²     Physical Exam  Constitutional:       General: She is not in acute distress.     Appearance: Normal appearance. She is ill-appearing. She is not toxic-appearing or diaphoretic.      Comments: Comfortable, pleasant, stable, did look thinner than at last visit.    HENT:      Head: Normocephalic and atraumatic.   Eyes:      General: No scleral icterus.        Right eye: No discharge.         Left eye: No discharge.      Conjunctiva/sclera: Conjunctivae normal.   Pulmonary:      Effort: Pulmonary effort is normal. No respiratory distress.   Skin:     Coloration: Skin is not jaundiced or pale.   Neurological:      Mental Status: She is alert and oriented to person, place, and time.   Psychiatric:         Mood and Affect: Mood normal.         Behavior: Behavior normal.         Thought Content: Thought content normal.         Judgment: Judgment normal.       Recent labs:  Lab Results   Component  Value Date/Time    SODIUM 135 04/28/2025 02:01 PM    SODIUM 136 02/20/2024 05:15 PM    K 4.2 04/28/2025 02:01 PM    K 4.0 02/20/2024 05:15 PM    BUN 30 (H) 04/28/2025 02:01 PM    BUN 12 02/20/2024 05:15 PM    CREATININE 1.03 04/28/2025 02:01 PM    CREATININE 0.97 02/20/2024 05:15 PM    GLUC 98 04/28/2025 02:01 PM    GLUC 114 (H) 02/20/2024 05:15 PM    CALCIUM 9.2 04/28/2025 02:01 PM    CALCIUM 9.6 02/20/2024 05:15 PM    AST 19 04/28/2025 02:01 PM    AST 83 (H) 02/20/2024 05:15 PM    ALT 6 (L) 04/28/2025 02:01 PM    ALT 50 02/20/2024 05:15 PM    ALB 3.5 04/28/2025 02:01 PM    ALB 3.5 02/20/2024 05:15 PM    TP 7.1 04/28/2025 02:01 PM    TP 7.6 02/20/2024 05:15 PM    EGFR 55 04/28/2025 02:01 PM    EGFR 64 02/20/2024 05:15 PM     Lab Results   Component Value Date/Time    HGB 9.2 (L) 04/28/2025 02:01 PM    WBC 11.05 (H) 04/28/2025 02:01 PM     04/28/2025 02:01 PM    INR 1.09 06/03/2024 08:26 AM    PTT 31 02/21/2024 01:43 PM     Lab Results   Component Value Date/Time    NID8GQBRMRBS 1.722 07/29/2024 03:03 PM       Recent Imaging:  Procedure: IR gastrostomy (G) tube check/change/reinsertion/upsize  Result Date: 4/9/2025  Narrative: PROCEDURE: 1.  Fluoroscopic-guided gastrostomy catheter exchange 2.  Chemical cauterization of granulation tissue at the gastrostomy site (20380). STAFF: Matthew Pantoja M.D. CONTRAST: 20 mL Omnipaque intragastric. FLUOROSCOPY TIME: 0.6 minutes. NUMBER OF IMAGES: Multiple. COMPLICATIONS: None. MEDICATIONS: None. INDICATION: Tonsillar cancer, requiring a gastrostomy catheter parenteral nutrition.  The existing catheter is an EnFit 18 Indian gastrostomy catheter. PROCEDURE: The existing gastrostomy catheter was injected and images obtained. The tube was then removed and a new 18 Indian EnFit gastrostomy catheter was placed into the stomach. The retention balloon was inflated with 10 mL of saline and very dilute contrast.  Post placement injection was performed. The bumper was then brought  down to the skin surface, a tract length of 4.5 cm. Given the degree of granulation tissue around the tract site, chemical cauterization was then performed utilizing silver nitrate. FINDINGS: 1.  Initial injection shows the catheter to be in good position. 2.  Final fluoroscopic image shows the new catheter to be in good position.     Impression: Gastrostomy catheter exchange. PLAN: The tube may be used immediately. Consider preventative maintenance exchange in 6 months. Workstation performed: [id number]     Procedure: CT chest abdomen pelvis w contrast  Result Date: 2/25/2025  Narrative: CT CHEST, ABDOMEN AND PELVIS WITH IV CONTRAST INDICATION: C18.9: Malignant neoplasm of colon, unspecified C78.7: Secondary malignant neoplasm of liver and intrahepatic bile duct. COMPARISON: CT chest abdomen pelvis 12/3/2024; PET CT 12/27/2024; MRI pelvis 7/6/2023 TECHNIQUE: CT examination of the chest, abdomen and pelvis was performed. Multiplanar 2D reformatted images were created from the source data. This examination, like all CT scans performed in the [facility], was performed utilizing techniques to minimize radiation dose exposure, including the use of iterative reconstruction and automated exposure control. Radiation dose length product (DLP) for this visit: 701.75 mGy-cm IV Contrast: 50 mL of iohexol Enteric Contrast: Not administered. FINDINGS: CHEST LUNGS:  A 4 mm nodule superior segment right lower lobe (image 604/87) has increased from prior where it measured 3 mm. There are a few scattered tiny nodules which are new from prior measuring up to 3 mm on series 604 images 89, 150, 159 and 162. Mild right anterior subpleural reticulation is similar to prior, likely on the basis of prior radiation treatment. Central airways are clear. No focal consolidation. PLEURA: Unremarkable. HEART/GREAT VESSELS: Heart size normal. Coronary artery calcification. No pericardial effusion. No thoracic aortic aneurysm.  MEDIASTINUM AND STEFANIE: Unremarkable. CHEST WALL AND LOWER NECK: Right chest port with tip in the SVC. Skin thickening and postsurgical changes in the right breast are similar to prior. Right posterior thyroid nodule with coarse calcification measuring 1.8 cm. ABDOMEN LIVER/BILIARY TREE: Interval progression of hepatic metastases including both increase in size and number of lesions. The dominant heterogeneous right hepatic lesion measures approximately eight 9.9 x 8.2 cm on image 301/110, previously 7.2 x 5.9 cm. There is again extension posteriorly with extracapsular component. There are couple of new lesions including a 1.7 cm hypodensity in segment 8 on image 301/91. Additional tiny new lesions are seen on images 301/89 and 301/96 in the right lobe. GALLBLADDER: Cholelithiasis without findings of acute cholecystitis. SPLEEN: Unremarkable. PANCREAS: Unremarkable. ADRENAL GLANDS: Unremarkable. KIDNEYS/URETERS: Unremarkable. No hydronephrosis. STOMACH AND BOWEL: Stable postsurgical change of right hemicolectomy. Small duodenal diverticulum. Percutaneous gastrostomy tube is noted. No bowel obstruction. APPENDIX: Surgically absent. ABDOMINOPELVIC CAVITY: No ascites. No pneumoperitoneum. No lymphadenopathy. VESSELS: Atherosclerosis without abdominal aortic aneurysm. PELVIS REPRODUCTIVE ORGANS: Similar appearance of right adnexal mass which was favored to represent a pedunculated fibroid on the prior pelvic MRI. URINARY BLADDER: Decompressed, limiting evaluation. ABDOMINAL WALL/INGUINAL REGIONS: Unremarkable. BONES: No acute fracture or suspicious osseous lesion. Spinal degenerative changes.     Impression: 1.  Interval progression of hepatic metastatic disease with increase in size and number of lesions. 2.  Slight increase in size of a right lower lobe pulmonary nodule. There are also a few new tiny pulmonary nodules measuring up to 3 mm. This is suspicious for metastatic disease. Recommend attention on 3-month  follow-up. 3.  Right thyroid nodule measuring 1.8 cm. Consider correlation with thyroid ultrasound. The study was marked in EPIC for significant notification. Workstation performed: [id number]     Procedure: FL barium swallow video w speech  Result Date: 1/13/2025  Narrative: A video barium swallow study was performed by the Department of Speech Pathology. Please refer to the report for the official interpretation. The images are stored for archival purposes only. Study images were not formally reviewed by the Radiology Department.    I have spent a total time of 20 minutes in caring for this patient on the day of the visit/encounter including Diagnostic results, Prognosis, Risks and benefits of tx options, Instructions for management, Patient and family education, Importance of tx compliance, Risk factor reductions, Impressions, Counseling / Coordination of care, Documenting in the medical record, Reviewing / ordering tests, medicine, procedures  , and Obtaining or reviewing history  . Topics discussed with the patient / family include symptom assessment and management, medication review, medication adjustment, goals of care, opioid titration, supportive listening, and anticipatory guidance.     Ariadne Fontana MD  Palliative Medicine & Supportive Care  Internal Medicine  Available via [employer] Text  Office: [phone]  Fax: [phone]

## 2025-04-30 NOTE — PATIENT INSTRUCTIONS
Nutrition Rx & Recommendations:  Diet: enteral nutrition, soft/moist high calorie high protein  Stay hydrated by sipping fluids of choice/tolerance throughout the day.   Follow proper oral care; Try baking soda/salt water rinse recipe (mix 3/4 tsp salt + 1 tsp baking soda + 1 qt water; rinse with plain water after using) in Eating Hints book (pg 18).  Brush your teeth before/after meals & before bed.  Weigh yourself regularly. If you notice weight loss, make an effort to increase your daily food/calorie intake. If you continue to notice loss after these efforts, reach out to your dietitian to establish a plan to stabilize weight.   Continue with Ensure Plus/Complete 2 times daily. try drinking orally, but OK to put through PEG if tolerated better  Choose liquids with calories: whole milk, Fairlife milk (higher protein/lactose-free milk), chocolate milk, drinkable yogurt, kefir, 100% fruit juice, diluted juice, bone broth (higher protein broth), creamy soups, sports drinks (Gatorade, Poweraide, Pedialyte, etc.), Italian ice, popsicles, milkshakes, smoothies, oral nutrition supplements (Ensure, Boost, etc.), gelatin/Jello, etc.  Soft/easy to swallow foods that are high in calories and protein: casseroles, chicken/egg/tuna salad with extra garcia to add calories and moisture, oatmeal/cream of wheat made with whole milk, cottage cheese, whole milk Greek yogurt, scrambled eggs with cheese, avocado, macaroni and cheese, mashed potatoes made with butter and whole milk or dry milk powder, ground meat with extra sauce/gravy, canned fruit, peanut butter, cream soups (cream of chicken, cream of mushroom, broccoli cheddar), pudding made with whole milk, custard, ice cream, banana, milkshakes/smoothies, Review page 47 of Eating Hints Book for more ideas.   TF plan -   TF Goal:  Continue Villij 1.5, at lease 2 cartons/day  Contact RD for substitutions  Call [facility] when you have 10 days left of TF supplies: [phone],  option 3 (customer support).  Once eating more orally/consistent meals, will discuss further TF weaning    Follow Up Plan: during infusion on 5/14/25   Recommend Referral to Other Providers: none at this time

## 2025-04-30 NOTE — ASSESSMENT & PLAN NOTE
In the neck/throat area, sig better now. Not taking oxy daily anymore  Continue tylenol 160mg/5mL, 20mL (640mg) q6H prn - taking only twice a day. Max of 3000mg in 24H  Continue oxycodone 5mg/5mL, 5mL q6H prn - taking 0-2 a day. No need for fill today  No issues with [facility]

## 2025-04-30 NOTE — ASSESSMENT & PLAN NOTE
Latest repeat CT on 2/24/2025 showed interval progression of hepatic metastatic disease with increase in size and number of lesions; slight increase in size of a right lower lobe pulmonary nodule. There are also a few new tiny pulmonary nodules measuring up to 3 mm. This is suspicious for metastatic disease.   She remains on same treatments but with reduced irinotecan and removal of 5-FU given neuropathy - tolerating very well   Orders:    dronabinol (MARINOL) 2.5 mg capsule; Take 1 capsule (2.5 mg total) by mouth in the morning

## 2025-04-30 NOTE — ASSESSMENT & PLAN NOTE
Orders:    dronabinol (MARINOL) 2.5 mg capsule; Take 1 capsule (2.5 mg total) by mouth in the morning

## 2025-04-30 NOTE — PROGRESS NOTES
Outpatient Oncology Nutrition Consultation   Type of Consult: Follow Up   Care Location: Telephone Call    Reason for referral: Received notification by [facility] PEPE ZAPATA on 8/21/24 that pt has triggered for oncology nutrition care (reason for referral: HNC dx and Malnutrition Screening Tool (MST) Triggers: scored a 0 indicating No recent wt loss and is not eating poorly due to a decreased appetite. 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 She was last seen 12/20/24.      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, unresectable  7/31/2023 - FOLFOX  11/20/2023 - opdivo added to FOLFOX  12/18/2023-cycle 11-20% dose reduction of 5-FU bolus and oxaliplatin due to increased fatigue  Jan 2024 - oxaliplatin removed from treatment plan due to neuropathy - PET scan shows good disease control in all areas except colon lesion  3/2024 - right hemicolectomy - pT3N0  6/3/24 - cryoablation to liver (no interruption to systemic therapy)  7/30/2024 - stop folfox  9/2024  - 10/2024 - cis/RT to tonsils and cervical Lns  12/2024 - disease progression of colon cancer in the liver  01/2025 - Initiated FOLFIRI every 14-days, with 50% dose-reduction of Irinotecan due to pre-existing diarrhea.  February 2025: systemic chemotherapy held due to patient experiencing prolonged side effects of diarrhea and neutropenia.   March 2025: resume C3 of FOLFIRI       12/27/24 PET scan-  1. Interval progression of hypermetabolic malignancy/metastatic disease involving the liver.  2. Interval near complete metabolic response to therapy of previously noted hypermetabolic malignancy/metastatic disease involving the neck.  3. Essentially stable large right adnexal mass with low-level associated FDG activity.  4. 3 mm right lung nodule, new since prior PET/CT. Short interval follow-up with CT of chest in 3 months is recommended to ensure stability and exclude developing pulmonary malignancy/metastatic disease.      12/31/24 Dr. Medina- Clear progression of colon cancer in liver on PET. FOLFIRI with 50% dose reduction of the irinotecan because of pre-existing diarrhea. Cholestyramine TID and Imodium. Continue hydration 3 times a week    1/30/25 Dr. Matthew- Tonsillar fossa is still healing. Evidence of thrush. Will continue Nystatin. F/u 1-2 months    2/4/25 Dr. Medina- due for cycle 3 of FOLFIRI which has been poorly tolerated. Significant diarrhea. Neutropenic. Hold this cycle and f/u in 2 weeks.    2/18/25 Dr. Medina- Give patient another 2 weeks off from chemo. New CT scan during treatment break. Discussed hospice care if chemo adversely affects QOL      2/24/25 CT C/A/P-  1.  Interval progression of hepatic metastatic disease with increase in size and number of lesions.  2.  Slight increase in size of a right lower lobe pulmonary nodule. There are also a few new tiny pulmonary nodules measuring up to 3 mm. This is suspicious for metastatic disease. Recommend attention on 3-month follow-up.  3.  Right  thyroid nodule measuring 1.8 cm. Consider correlation with thyroid ultrasound.      25 Dr. Medina- Restart chemo with 50% dose reduction of irinotecan and remove 5-FU bolus and leucovorin. CT with disease progression    3/18/25 Dr. Medina- Did well with restart of FOLFIRI. Continue without adjustments    25 Dr. Medina- Proceed with cycle 4, doing remarkably well. Continue seeing every 2 weeks. CT scan after 6 cycles      Upcomin25 speech therapy  25 Dr. Medina  25 palliative care     Malignant neoplasm of right female breast (HCC)   2018 Initial Diagnosis    Malignant neoplasm of right female breast (HCC)     2018 Biopsy    Rt Breast US BX 1100 8cmfn, 4 passes 12g Marquee:  - Invasive breast carcinoma of no special type (ductal NST/invasive ductal carcinoma).  - grade 2  - %  - HI 95%  - HER2 negative     2018 Surgery    Right partial mastectomy and SLN biopsy:  Infiltrating ductal carcinoma, Grade 2/3, felt to be between 2.0 cm and 2.5 cm in greatest dimension  - No invasive tumor is seen at inked margins, but there is a focus of DCIS seen within approximately 1mm of the inked medial margin  - no LVI  - no LCIS  - 0/2 LN's  at least Stage IIA - pT2, pN0, cM0, G2.    - Dr Coronado     2018 -  Cancer Staged    Stage IIA - pT2, pN0, cM0, G2.       2019 Genomic Testing    Oncotype DX score: 13     2019 -  Hormone Therapy    anastrozole 1 mg daily as adjuvant endocrine therapy    - Dr Daley       2019 - 4/3/2019 Radiation    Course: C1    Plan ID Energy Fractions Dose per Fraction (cGy) Dose Correction (cGy) Total Dose Delivered (cGy) Elapsed Days   R Breast 10X/6X 25 / 25 200 0 5,000 40   R BRST BOOST 16E  200 0 1,000 7      Treatment dates:  C1: 2019 - 4/3/2019       Metastatic colon cancer to liver (HCC)   2023 Genetic Testing    CARIS Results:   KRAS Pathogenic Variant Exon 2  p.G12D : lack of benefit of cetuximab,  "panitumumab Level 2   No other actionable mutation; MSI stable; TMB low   MiFOLOXAi Results: \"Decreased Benefit of FOLFOX + Bevacizumab in first line metastatic CRC.  This patient may achieve improved results by receiving an alternative to FOLFOX, such as FOLFIRI, as their initial regimen. As an adjustment to frontline FOLFOXIRI following toxicity: This patient may achieve improved results by removing the oxaliplatin portion of their regimen\".         7/11/2023 Initial Diagnosis    Metastatic colon cancer to liver (HCC)     7/13/2023 -  Cancer Staged    Staging form: Colon and Rectum, AJCC 8th Edition  - Clinical stage from 7/13/2023: cT3, cN0, pM1 - Signed by Harika Medina DO on 9/28/2023  Total positive nodes: 0       7/31/2023 - 8/1/2024 Chemotherapy    cyanocobalamin, 1,000 mcg, Intramuscular, Every 30 days, 7 of 9 cycles  Administration: 1,000 mcg (5/9/2024), 1,000 mcg (5/24/2024), 1,000 mcg (6/20/2024), 1,000 mcg (7/3/2024), 1,000 mcg (7/18/2024), 1,000 mcg (8/1/2024)  potassium chloride, 20 mEq, Intravenous, Once, 2 of 2 cycles  Administration: 20 mEq (11/6/2023), 20 mEq (11/20/2023)  alteplase (CATHFLO), 2 mg, Intracatheter, Every 1 Minute as needed, 21 of 23 cycles  Administration: 2 mg (1/3/2024)  pegfilgrastim (NEULASTA), 6 mg, Subcutaneous, Once, 12 of 12 cycles  Administration: 6 mg (10/25/2023), 6 mg (11/8/2023), 6 mg (11/22/2023), 6 mg (12/6/2023), 6 mg (12/20/2023), 6 mg (1/12/2024), 6 mg (1/25/2024), 6 mg (4/25/2024), 6 mg (5/9/2024), 6 mg (5/24/2024), 6 mg (6/20/2024)  fluorouracil (ADRUCIL), 895 mg, Intravenous, Once, 21 of 23 cycles  Dose modification: 320 mg/m2 (original dose 400 mg/m2, Cycle 11)  Administration: 900 mg (7/31/2023), 900 mg (8/14/2023), 900 mg (8/28/2023), 900 mg (9/11/2023), 900 mg (9/25/2023), 900 mg (10/9/2023), 900 mg (10/23/2023), 895 mg (11/6/2023), 895 mg (11/20/2023), 895 mg (12/4/2023), 700 mg (12/18/2023), 680 mg (1/10/2024), 680 mg (1/23/2024), 625 mg (4/23/2024), " 625 mg (5/7/2024), 625 mg (5/22/2024), 625 mg (6/4/2024), 625 mg (6/18/2024), 625 mg (7/1/2024), 625 mg (7/16/2024), 625 mg (7/30/2024)  nivolumab (OPDIVO) IVPB, 240 mg (100 % of original dose 240 mg), Intravenous, Once, 13 of 15 cycles  Dose modification: 240 mg (original dose 240 mg, Cycle 9)  Administration: 240 mg (11/20/2023), 240 mg (12/4/2023), 240 mg (12/18/2023), 240 mg (1/10/2024), 240 mg (1/23/2024), 240 mg (4/23/2024), 240 mg (5/7/2024), 240 mg (5/22/2024), 240 mg (6/4/2024), 240 mg (6/18/2024), 240 mg (7/1/2024), 240 mg (7/16/2024), 240 mg (7/30/2024)  leucovorin calcium IVPB, 896 mg, Intravenous, Once, 21 of 23 cycles  Administration: 900 mg (7/31/2023), 900 mg (8/14/2023), 900 mg (8/28/2023), 900 mg (9/11/2023), 900 mg (9/25/2023), 900 mg (10/9/2023), 900 mg (10/23/2023), 900 mg (11/6/2023), 900 mg (11/20/2023), 900 mg (12/4/2023), 900 mg (12/18/2023), 850 mg (1/10/2024), 850 mg (1/23/2024), 800 mg (4/23/2024), 800 mg (5/7/2024), 800 mg (5/22/2024), 800 mg (6/4/2024), 800 mg (6/18/2024), 800 mg (7/1/2024), 800 mg (7/16/2024), 800 mg (7/30/2024)  oxaliplatin (ELOXATIN) chemo infusion, 85 mg/m2 = 190.4 mg, Intravenous, Once, 12 of 12 cycles  Dose modification: 68 mg/m2 (original dose 85 mg/m2, Cycle 11, Reason: Other (Must fill in a comment), Comment: increased fatigue)  Administration: 190.4 mg (7/31/2023), 190.4 mg (8/14/2023), 200 mg (8/28/2023), 200 mg (9/11/2023), 200 mg (9/25/2023), 200 mg (10/9/2023), 200 mg (10/23/2023), 200 mg (11/6/2023), 200 mg (11/20/2023), 190.4 mg (12/4/2023), 150 mg (12/18/2023), 150 mg (1/10/2024)  fluorouracil (ADRUCIL) ambulatory infusion Soln, 1,200 mg/m2/day = 5,375 mg, Intravenous, Over 46 hours, 21 of 23 cycles     9/26/2023 -  Cancer Staged    Staging form: Colon and Rectum, AJCC 8th Edition  - Pathologic: pT3, pN0, cM1 - Signed by Vickie Israel MD on 4/3/2024  Total positive nodes: 0       3/12/2024 Surgery    A. Terminal ileum, appendix, right colon (right  hemicolectomy):  - Adenocarcinoma (5.2cm)  - Forty-nine (49) lymph nodes negative for carcinoma (0/49)    - Acellular mucin present in one (1) lymph node   - See staging synoptic (ypT3N0)     1/8/2025 -  Chemotherapy    fluorouracil (ADRUCIL), 400 mg/m2 = 770 mg, 2 of 2 cycles  Administration: 770 mg (1/8/2025), 770 mg (1/22/2025)  irinotecan (CAMPTOSAR) chemo infusion, 173 mg (50 % of original dose 180 mg/m2), 7 of 11 cycles  Dose modification: 90 mg/m2 (original dose 180 mg/m2, Cycle 1, Reason: Anticipated Tolerance, Comment: 50% dose reduction due to pre-existing diarrhea)  Administration: 180 mg (1/8/2025), 180 mg (1/22/2025), 160 mg (3/5/2025), 160 mg (3/19/2025), 160 mg (4/2/2025), 160 mg (4/16/2025)  leucovorin calcium IVPB, 768 mg, 2 of 2 cycles  Administration: 800 mg (1/8/2025), 800 mg (1/22/2025)  fluorouracil (ADRUCIL) ambulatory infusion Soln, 1,200 mg/m2/day = 4,610 mg, 7 of 11 cycles     Right tonsillar squamous cell carcinoma (HCC)   7/27/2023 Initial Diagnosis    Right tonsillar squamous cell carcinoma (HCC)     7/27/2023 -  Cancer Staged    Staging form: Pharynx - Oropharynx, AJCC 8th Edition  - Clinical stage from 7/27/2023: Stage III (cT1, cN3, cM0, p16+) - Signed by Evan Plummer MD on 7/27/2023  Stage prefix: Initial diagnosis       9/16/2024 - 11/13/2024 Radiation      Plan ID Energy Fractions Dose per Fraction (cGy) Dose Correction (cGy) Total Dose Delivered (cGy) Elapsed Days   Head_Neck 6X-FFF 35 / 35 200 0 7,000 58    C2: 9/16/2024 - 11/13/2024 9/17/2024 - 10/21/2024 Chemotherapy    alteplase (CATHFLO), 2 mg, Intracatheter, Every 1 Minute as needed, 6 of 6 cycles  Administration: 2 mg (10/21/2024)  CISplatin (PLATINOL) infusion, 70 mg (original dose 40 mg/m2), Intravenous, Once, 6 of 6 cycles  Dose modification: 70 mg (original dose 40 mg/m2, Cycle 1, Reason: Anticipated Tolerance), 30 mg/m2 (original dose 40 mg/m2, Cycle 4, Reason: Neuropathy, Comment: dose reduction to 30  mg/m2 due to neuropathy)  Administration: 70 mg (9/17/2024), 70 mg (9/23/2024), 70 mg (9/30/2024), 59.1 mg (10/7/2024), 59.1 mg (10/14/2024), 59.1 mg (10/21/2024)  sodium chloride, 500 mL, Intravenous, Once, 6 of 6 cycles  Administration: 500 mL (9/17/2024), 500 mL (9/17/2024), 500 mL (9/23/2024), 500 mL (9/23/2024), 500 mL (9/30/2024), 500 mL (9/30/2024), 500 mL (10/7/2024), 500 mL (10/7/2024), 500 mL (10/14/2024), 500 mL (10/14/2024), 500 mL (10/21/2024)  fosaprepitant (EMEND) IVPB, 150 mg, Intravenous, Once, 6 of 6 cycles  Administration: 150 mg (9/17/2024), 150 mg (9/23/2024), 150 mg (9/30/2024), 150 mg (10/7/2024), 150 mg (10/14/2024), 150 mg (10/21/2024)  IVPB builder, , Intravenous, Once, 1 of 1 cycle  Administration: Unknown dose (10/21/2024)       Past Medical & Surgical Hx:   Patient Active Problem List   Diagnosis    Primary hypertension    Mixed hyperlipidemia    Malignant neoplasm of right female breast (HCC)    Vitamin D deficiency    Prediabetes    Right thyroid nodule    GERD (gastroesophageal reflux disease)    Obesity    Metastatic colon cancer to liver (HCC)    Right tonsillar squamous cell carcinoma (HCC)    Poor appetite    Nutritional anemia    Dehydration    Drug-induced constipation    Chemotherapy induced neutropenia (HCC)    Stage 3a chronic kidney disease (HCC)    Thrombocytopenia (HCC)    Neuropathy    Diastolic dysfunction    Hypokalemia    Leukemoid reaction    GOGO (acute kidney injury) (HCC)    Acute post-operative pain    At risk for constipation    At risk for delirium    Palliative care encounter    Physical deconditioning    History of CVA (cerebrovascular accident)    Chronic nasal congestion    Elevated LFTs    Vitamin B12 deficiency    Neoplastic malignant related fatigue    Sensation, choking    S/P percutaneous endoscopic gastrostomy (PEG) tube placement (HCC)    Pancytopenia due to chemotherapy (HCC)    Cancer related pain    Hypomagnesemia    Functional diarrhea     Antineoplastic chemotherapy induced anemia    Hypertensive heart and renal disease with congestive heart failure (HCC)     Past Medical History:   Diagnosis Date    Anemia     Arthritis     Body mass index (BMI) 40.0-44.9, adult (HCC) 9/22/2023    Breast cancer (HCC) 12/17/2018    Cancer (HCC)     right breast, colon, liver, right tonsil    Cervical lymphadenopathy 3/21/2023    CT neck on 3/30/2023- Large right level 2A lymphadenopathy as described above and suspicious for neoplasm.  Correlation with histopathology is recommended.  Mild asymmetric prominence of the right palatine tonsil with otherwise no definitive nodular enhancing lesions identified along the course of the aerodigestive tract.     5/26/23- FNA of this node was nonrevealing for tissue etiology, but it d    Encounter for screening for HIV 07/07/2022    Follow-up examination 04/04/2023    GERD (gastroesophageal reflux disease)     Hyperlipidemia     Hypertension     Obesity     Stroke (HCC)     TIA     Stroke (HCC)     TIA     Transaminitis 09/22/2021    Vasomotor rhinitis 8/9/2018    Refilled flonase today. Stopped taking since January 2022     Past Surgical History:   Procedure Laterality Date    BREAST BIOPSY Right 11/23/2018    us guided bx cancer    BREAST SURGERY      COLONOSCOPY      ESOPHAGOSCOPY N/A 07/20/2023    Procedure: ESOPHAGOSCOPY;  Surgeon: David Chapa MD;  [facility];  Service: ENT    HEMICOLOECTOMY W/ ANASTOMOSIS Right 3/12/2024    Procedure: RIGHT COLECTOMY WITH ROBOTICS;  Surgeon: Vickie Israel MD;  [facility];  Service: Surgical Oncology    IR BIOPSY LIVER MASS  06/27/2023    IR CRYOABLATION  6/3/2024    IR GASTROSTOMY (G) TUBE CHECK/CHANGE/REINSERTION/UPSIZE  4/9/2025    IR GASTROSTOMY TUBE PLACEMENT  10/2/2024    IR PORT CHECK  01/03/2024    IR PORT PLACEMENT  07/28/2023    IR PORT STRIPPING  01/09/2024    LAPAROTOMY N/A 3/12/2024    Procedure: LAPAROTOMY EXPLORATORY W/ ROBOTICS;  Surgeon:  Vickie Israel MD;  [facility];  Service: Surgical Oncology    MO BIOPSY NASOPHARYNX VISIBLE LESION SIMPLE N/A 10/9/2024    Procedure: NASOPHARYNGOSCOPY with biospy;  Surgeon: Juanito Matthew MD;  Location: [facility];  Service: ENT    MO [facility] INCL FLUOR GDNCE DX W/CELL WASHG SPX N/A 2023    Procedure: BRONCHOSCOPY;  Surgeon: David Chapa MD;  [facility];  Service: ENT    MO LARYNGOSCOPY W/BIOPSY MICROSCOPE/TELESCOPE N/A 2023    Procedure: MICRODIRECT LARYNGOSCOPY WITH BIOPSY;  Surgeon: David Chapa MD;  [facility];  Service: ENT    MO LARYNGOSCOPY W/WO TRACHEOSCOPY DX EXCEPT  N/A 10/9/2024    Procedure: DIRECT LARYNGOSCOPY;  Surgeon: Juanito Matthew MD;  [facility];  Service: ENT    MO MASTECTOMY PARTIAL W/AXILLARY LYMPHADENECTOMY Right 2018    Procedure: ULTRASOUND LOCALIZED PARTIAL MASTECTOMY W/SENTINEL NODE BIOPSY POSS AXILLARY DISSECTION;  Surgeon: Zahida Coronado MD;  Location: [facility];  Service: General    MO TONSILLECTOMY PRIMARY/SECONDARY AGE 12/> N/A 10/9/2024    Procedure: TONSILLECTOMY;  Surgeon: Juanito Matthew MD;  [facility];  Service: ENT    TUBAL LIGATION      US GUIDED BREAST BIOPSY RIGHT COMPLETE Right 2018    US GUIDED INJECTION FOR RESEARCH STUDY  2018    US GUIDED INJECTION FOR RESEARCH STUDY  2018    US GUIDED INJECTION FOR RESEARCH STUDY  2019    US GUIDED LYMPH NODE BIOPSY RIGHT  2023       Review of Medications:   Vitamins, Supplements and Herbals: No, pt denies taking supplements    Current Outpatient Medications:     acetaminophen (TYLENOL) 160 mg/5 mL liquid, Take 20 mL (640 mg total) by mouth every 6 (six) hours as needed for mild pain for up to 10 days, Disp: 473 mL, Rfl: 3    anastrozole (ARIMIDEX) 1 mg tablet, Take 1 tablet (1 mg total) by mouth daily, Disp: 90 tablet, Rfl: 0    atorvastatin (LIPITOR) 40 mg tablet, Take 1 tablet (40 mg total) by  mouth daily at bedtime, Disp: 30 tablet, Rfl: 2    cholestyramine (QUESTRAN) 4 g packet, Take 1 packet (4 g total) by mouth 3 (three) times a day with meals, Disp: 90 packet, Rfl: 3    diphenhydramine, lidocaine, Al/Mg hydroxide, simethicone (Magic Mouthwash) SUSP, Swish and swallow 10 mL every 4 (four) hours as needed for mouth pain or discomfort (Patient not taking: Reported on 1/31/2025), Disp: 480 mL, Rfl: 2    docusate sodium (COLACE) 50 mg capsule, Take 1 capsule (50 mg total) by mouth 2 (two) times a day, Disp: 90 capsule, Rfl: 1    dronabinol (MARINOL) 2.5 mg capsule, Take 1 capsule (2.5 mg total) by mouth in the morning, Disp: 5 capsule, Rfl: 0    ergocalciferol (VITAMIN D2) 50,000 units, Take 1 capsule (50,000 Units total) by mouth once a week, Disp: 90 capsule, Rfl: 0    fluorouracil 4,390 mg in CADD/Elastomeric Infusion Device, Infuse 4,390 mg (1,200 mg/m2/day x 1.83 m2) into a catheter in a vein via infusion device over 46 hours for 2 days  Infusion planned for April 30, 2025., Disp: 1 each, Rfl: 0    folic acid (FOLVITE) 1 mg tablet, Take 1 tablet (1 mg total) by mouth in the morning, Disp: 90 tablet, Rfl: 3    gabapentin (NEURONTIN) 300 mg capsule, Take 1 pills in the morning, 1 pill at lunch and 2 pills before bed, Disp: 120 capsule, Rfl: 0    hydrocortisone 1 % ointment, , Disp: , Rfl:     ibuprofen (MOTRIN) 100 mg/5 mL suspension, Take 20 mL (400 mg total) by mouth every 6 (six) hours as needed for moderate pain (Patient not taking: Reported on 1/31/2025), Disp: 473 mL, Rfl: 0    lidocaine-prilocaine (EMLA) cream, Apply topically as needed for mild pain (Patient not taking: Reported on 12/6/2024), Disp: 30 g, Rfl: 3    losartan (COZAAR) 50 mg tablet, Take 1 tablet (50 mg total) by mouth daily, Disp: 90 tablet, Rfl: 2    magnesium Oxide (MAG-OX) 400 mg TABS, 1 tablet (400 mg total) by Per G Tube route 2 (two) times a day (Patient not taking: Reported on 1/31/2025), Disp: 60 tablet, Rfl: 0     mirtazapine (REMERON) 15 mg tablet, Take 1 tablet (15 mg total) by mouth daily at bedtime Patient is also on a 30 mg tablet, for a total dose of 45 mg, Disp: 30 tablet, Rfl: 0    mirtazapine (REMERON) 30 mg tablet, Take 1 tablet (30 mg total) by mouth daily at bedtime, Disp: 30 tablet, Rfl: 0    nystatin (MYCOSTATIN) 500,000 units/5 mL suspension, Apply 5 mL (500,000 Units total) to the mouth or throat 4 (four) times a day, Disp: 600 mL, Rfl: 3    omeprazole (PriLOSEC) 20 mg delayed release capsule, Take 1 capsule (20 mg total) by mouth daily, Disp: 30 capsule, Rfl: 3    ondansetron (ZOFRAN) 8 mg tablet, Take 1 tablet (8 mg total) by mouth every 8 (eight) hours as needed for nausea or vomiting, Disp: 90 tablet, Rfl: 2    oxyCODONE (ROXICODONE) 5 mg/5 mL solution, Take 5 mL (5 mg total) by mouth every 6 (six) hours as needed for severe pain ((breakthrough pain)) Max Daily Amount: 20 mg, Disp: 473 mL, Rfl: 0    potassium chloride (Klor-Con M20) 20 mEq tablet, Take 1 tablet (20 mEq total) by mouth 2 (two) times a day (Patient not taking: Reported on 1/31/2025), Disp: 90 tablet, Rfl: 1    potassium chloride 10% oral solution, 15 mL (20 mEq total) by Per G Tube route 2 (two) times a day, Disp: 473 mL, Rfl: 0    prochlorperazine (COMPAZINE) 10 mg tablet, Take 1 tablet (10 mg total) by mouth every 6 (six) hours as needed for nausea or vomiting, Disp: 90 tablet, Rfl: 2    pyridoxine (VITAMIN B6) 50 mg tablet, Take 1 tablet (50 mg total) by mouth daily, Disp: 90 tablet, Rfl: 3    vitamin B-12 (VITAMIN B-12) 1,000 mcg tablet, , Disp: , Rfl:   No current facility-administered medications for this visit.    Facility-Administered Medications Ordered in Other Visits:     Atropine Sulfate injection 0.25 mg, 0.25 mg, Intravenous, Once PRN, Harika Agostino, DO    irinotecan (CAMPTOSAR) 160 mg in sodium chloride 0.9 % 500 mL chemo infusion, 160 mg, Intravenous, Once, Harika Medina DO, Last Rate: 338.7 mL/hr at 04/30/25 1334, 160  "mg at 04/30/25 1334    Most Recent Lab Results:   Lab Results   Component Value Date    WBC 11.05 (H) 04/28/2025    NEUTROABS 8.81 (H) 04/28/2025    IRON 21 (L) 02/28/2025    TIBC 170.8 (L) 02/28/2025    FERRITIN 712 (H) 02/28/2025    CHOLESTEROL 167 04/24/2024    TRIG 137 04/24/2024    HDL 43 (L) 04/24/2024    LDLCALC 97 04/24/2024    ALT 6 (L) 04/28/2025    AST 19 04/28/2025    ALB 3.5 04/28/2025    SODIUM 135 04/28/2025    SODIUM 136 04/23/2025    K 4.2 04/28/2025    K 4.0 04/23/2025    CL 99 04/28/2025    BUN 30 (H) 04/28/2025    BUN 24 04/23/2025    CREATININE 1.03 04/28/2025    CREATININE 0.98 04/23/2025    EGFR 55 04/28/2025    PHOS 2.8 11/01/2024    PHOS 3.3 10/30/2024    TSH 1.58 01/03/2022    GLUCOSE 209 (H) 03/12/2024    POCGLU 89 12/27/2024    GLUF 102 (H) 10/30/2024    GLUF 95 09/13/2024    GLUC 98 04/28/2025    HGBA1C 5.5 02/21/2024    HGBA1C 5.9 (H) 06/13/2023    HGBA1C 6.1 (H) 07/09/2022    CALCIUM 9.2 04/28/2025    FOLATE 14.1 02/28/2025    MG 2.0 04/28/2025       Anthropometric Measurements:   Height: 66\"  Ht Readings from Last 1 Encounters:   04/30/25 5' 5.98\" (1.676 m)     Wt Readings from Last 20 Encounters:   04/30/25 73.5 kg (162 lb)   04/30/25 73 kg (161 lb)   04/16/25 73.5 kg (162 lb)   04/02/25 72.6 kg (160 lb 0.9 oz)   04/01/25 72.6 kg (160 lb)   03/19/25 70.8 kg (156 lb 1.4 oz)   03/18/25 71.2 kg (157 lb)   03/05/25 73.1 kg (161 lb 2.5 oz)   02/28/25 72.6 kg (160 lb)   02/18/25 73 kg (161 lb)   02/04/25 73.4 kg (161 lb 12.8 oz)   01/31/25 75.1 kg (165 lb 9.6 oz)   01/30/25 75.6 kg (166 lb 10.7 oz)   01/22/25 75.6 kg (166 lb 10.7 oz)   01/10/25 77.1 kg (170 lb)   01/08/25 76.5 kg (168 lb 10.4 oz)   12/31/24 82.6 kg (182 lb)   12/20/24 82.1 kg (181 lb)   12/19/24 84.4 kg (186 lb)   12/06/24 84.4 kg (186 lb)       Weight History:   Usual Weight: 275#  Varian: (2/22/19) 264.6#, (4/2/19) 263.4, (9/16/24) 197.4#, (9/23/24) 194#, (9/30/24) 192.8#, (10/7/24) 190.2#, (10/11/24) 194.6#, (10/14/24) " 191#, (10/17/24) 192.8#, (10/21/24) 190#, (10/24/24) 190#, (10/28/24) 189#, (11/11/24) 185#  Home Scale: n/a    Oncology Nutrition-Anthropometrics      Flowsheet Row Nutrition from 4/30/2025 in [facility] Nutrition from 4/16/2025 in [facility]   Patient [age]years): 70 years 70 years   Patient (female) height (in): 66 in 66 in   Current Weight to be used for anthropometric calculations (lbs) 161 lbs 162 lbs   Current Weight to be used for anthropometric calculations (kg) 73.2 kg 73.6 kg   BMI: 26 26.1   IBW female: 130 lbs 130 lbs   IBW (kg) female: 59.1 kg 59.1 kg   IBW % (female) 123.8 % 124.6 %   Adjusted BW (female): 137.8 lbs 138 lbs   Adjusted BW kg (female): 62.6 kg 62.7 kg   % weight change after 1 month: 0.6 % 3.2 %   Weight change after 1 month (lbs) 1 lbs 5 lbs   % weight change after 3 months: -2.8 % -4.7 %   Weight change after 3 months (lbs) -4.6 lbs -8 lbs            Nutrition-Focused Physical Findings: n/a due to telephone call    Food/Nutrition-Related History & Client/Social History:    Current Nutrition Impact Symptoms:  [] Nausea  [x] Reduced Appetite  [] Acid Reflux    [] Vomiting  [x] Unintended Wt Loss -stable now [] Malabsorption    [] Diarrhea -resolved [] Unintended Wt Gain  [] Dumping Syndrome    [] Constipation -BM every other day [] Thick Mucous/Secretions  [] Abdominal Pain    [x] Dysgeusia (Altered Taste) -starting to improve [x] Xerostomia (Dry Mouth)  [] Gas    [] Dysosmia (Altered Smell)  [] Thrush  [] Difficulty Chewing    [] Oral Mucositis (Sore Mouth)  [x] Fatigue  [] Hyperglycemia   Lab Results   Component Value Date    HGBA1C 5.5 02/21/2024      [x] Odynophagia -improving [] Esophagitis  [] Other:    [x] Dysphagia   Follows with SLP  Mechanical soft per SLP recommendation [] Early Satiety  [] No Problems Eating      Food Allergies & Intolerances: no    Current Diet: Soft/Moist, Pureed, and Tube  Feeding  Current Nutrition Intake: Increased since last visit  Appetite: Fair   Nutrition Route: PO and PEG  Oral Care: brushes daily  Activity level: ADLs.     24 Hr Diet Recall:   Breakfast: grits  Lunch: egg salad (no bread)  Dinner: spaghettios    Beverages: water (sips throughout the day)  Supplements:   Ensure Complete (10 oz, 350 kcal, 30 g pro) -2 daily, usually puts through tube, sometimes will drink them    EN Recall:  DME: [employer]  Access Type: PEG  Formula: [employer] Peptide 1.5  Method: Bolus  Regimen: 11oz (325 mL) 2  times per day of [employer] 1.5 via PEG (gravity syringe method to administer boluses; 1 container infusing over ~15-20 minutes).  Flush: 60 mL free water flush  pre/post each bolus (120 mL x 2 boluses = 240 mL)  EN providin mL volume (2 cartons/day), 1000 kcal (46% est needs), 48g protein (55% est needs), 456 mL free water, 696 mL total water (32% est needs).  Pt also flushing 1-2 syringes every few hours for added hydration. Pt gets IVF 3x/week.       Oncology Nutrition-Estimated Needs      Flowsheet Row Nutrition from 2025 in [facility] Nutrition from 2025 in [facility]   Weight type used Actual weight Actual weight   Weight in kilograms (kg) used for estimated needs 73.2 kg 77.3 kg   Energy needs formula:  30-35 kcal/kg 30-35 kcal/kg   Energy needs based on 30 kcal/k kcal 2318 kcal   Energy needs based on 35 kcal/k kcal 2705 kcal   Protein needs formula: 1.2-1.5 g/kg 1.2-1.5 g/kg   Protein needs based on 1.2 g/k g 93 g   Protein needs based on 1.5 g/kg 110 g 116 g   Fluid needs formula: 30-35 mL/kg 30-35 mL/kg   Fluid needs based on 30 mL/kg 2196 mL 2319 mL   Fluid needs in ounces 74 oz 78 oz   Fluid needs based on 35 mL/kg 2562 mL 2706 mL   Fluid needs in ounces 87 oz 91 oz             Discussion & Intervention:   Maira was evaluated today for an RD follow up  regarding HNC and enteral nutrition.  Maira has completed tx for HNC and is currently undergoing tx for metastatic colon cancer .  Spoke with Maira today via phone. She is doing well. She continues to work with SLP and feels like she's been able to eat more PO lately. She has been having 3 meals/day although they are small amounts/portions. She is maintaining her weight. She continues to utilize her PEG for nutrition and is doing 2 cartons of ScoreGrid daily, which provides about 50% of her estimated needs. She is also consuming 2 Ensure shakes daily- she will sometimes drink them, other times she puts them through the tube. Encouraged her to consume them orally as 1st choice, if able.   Reviewed 24 hour recall, which revealed an adequate enteral intake and PO intake, and discussed ways to optimize nutrient intake.  Also reviewed the importance of wt management throughout the tx process and the role of enteral nutrition in managing wt and overall health.  Based on today's assessment, discussion included: MNT for:   fluid, soft/mechanical/pureed diet,  enteral nutrition, practicing proper oral care, adequate hydration & fluid choices, utilizing oral nutrition supplements, practicing proper feeding tube use & care, adherence to and compliance with enteral nutrition plan of care, and individualized enteral nutrition recommendations & plan:   .   Moving forward, Maira was encouraged to continue TF  and continue increasing oral intake   Materials Provided: Ensure samples and Ensure Coupons-will provide them next week during her hydration  All questions and concerns addressed during today’s visit.  Maira has RD contact information.    Nutrition Diagnosis:   Increased Nutrient Needs (kcal & pro) related to increased demand for nutrients and disease state as evidenced by cancer dx and pt undergoing tx for cancer.  Swallowing Difficulty related to diagnosis/treatment related causes as evidenced by  need for  feeding tube, diet restriction per SLP.  Monitoring & Evaluation:   Goals:  weight maintenance/stabilization  adequate nutrition impact symptom management  pt to meet >/=75% estimated nutrition needs daily  Utilize PEG for supplemental nutrition    Progress Towards Goals: Progressing    Nutrition Rx & Recommendations:  Diet: enteral nutrition, soft/moist high calorie high protein  Stay hydrated by sipping fluids of choice/tolerance throughout the day.    Follow proper oral care; Try baking soda/salt water rinse recipe (mix 3/4 tsp salt + 1 tsp baking soda + 1 qt water; rinse with plain water after using) in Eating Hints book (pg 18).  Brush your teeth before/after meals & before bed.  Weigh yourself regularly. If you notice weight loss, make an effort to increase your daily food/calorie intake. If you continue to notice loss after these efforts, reach out to your dietitian to establish a plan to stabilize weight.   Continue with Ensure Plus/Complete 2 times daily. try drinking orally, but OK to put through PEG if tolerated better  Choose liquids with calories: whole milk, Fairlife milk (higher protein/lactose-free milk), chocolate milk, drinkable yogurt, kefir, 100% fruit juice, diluted juice, bone broth (higher protein broth), creamy soups, sports drinks (Gatorade, Poweraide, Pedialyte, etc.), Italian ice, popsicles, milkshakes, smoothies, oral nutrition supplements (Ensure, Boost, etc.), gelatin/Jello, etc.  Soft/easy to swallow foods that are high in calories and protein: casseroles, chicken/egg/tuna salad with extra garcia to add calories and moisture, oatmeal/cream of wheat made with whole milk, cottage cheese, whole milk Greek yogurt, scrambled eggs with cheese, avocado, macaroni and cheese, mashed potatoes made with butter and whole milk or dry milk powder, ground meat with extra sauce/gravy, canned fruit, peanut butter, cream soups (cream of chicken, cream of mushroom, broccoli cheddar), pudding made with  whole milk, custard, ice cream, banana, milkshakes/smoothies, Review page 47 of Eating Hints Book for more ideas.   TF plan -   TF Goal:  Continue [employer] 1.5, at lease 2 cartons/day  Contact RD for substitutions  Call [employer] when you have 10 days left of TF supplies: [phone], option 3 (customer support).  Once eating more orally/consistent meals, will discuss further TF weaning    Follow Up Plan:  during infusion on 5/14/25    Recommend Referral to Other Providers: none at this time

## 2025-04-30 NOTE — ASSESSMENT & PLAN NOTE
Trial low dose dronabinol 2.5mg OD am. Aware that this is a synthetic THC. Aware of SE, stop if not tolerating.     Orders:    dronabinol (MARINOL) 2.5 mg capsule; Take 1 capsule (2.5 mg total) by mouth in the morning

## 2025-04-30 NOTE — TELEPHONE ENCOUNTER
PA for DRONABINOL 2.5 MG  APPROVED     Date(s) approved UNTIL 12/31/2099    Case #    Patient advised by          []MyChart Message  []Phone call   [x]LMOM  []L/M to call office as no active Communication consent on file  []Unable to leave detailed message as VM not approved on Communication consent       Pharmacy advised by    [x]Fax  []Phone call  []Secure Chat    Specialty Pharmacy    []     Approval letter scanned into Media Yes

## 2025-04-30 NOTE — PROGRESS NOTES
"Daily Speech Treatment Note    Today's date: 2025  Patient’s name: Maira Moura  : 1955  MRN: [id number]  Safety measures: HTN, CVA, GERD, active CA, s/p PEG tube placement, aspiration precautions  Current recommended diet: PEG tube for primary nutrition/hydration; mechanical soft with thin liquids P.O. as tolerated   Referring provider: Harika Medina DO Encounter Diagnosis     ICD-10-CM    1. Status post tonsillectomy  Z90.89       2. 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 Dysphagia, oropharyngeal phase  R13.12         Visit Tracking:  POC   Expires Auth Expiration Date ST Visit Limit   2025 or 10th visit 2025 BOMN              Visit/Unit Tracking:  Auth Status Date 25           Not required Used 1             Remaining 9              Subjective/Behavioral:  -Patient reported that she tried On-Cor ribs last night. Patient stated that the meat was \"tough\" and difficult to swallow.    Objective/Assessment:    Short-term goals:  Patient will receive a videofluoroscopic swallow study (VFSS) to assess her current swallowing function to r/o penetration or aspiration, to determine the safest, least restrictive diet, and to recommended the appropriate rehabilitation exercises (to be achieved in 2-3 weeks).     Patient will tolerate P.O. trials of her recommended diet with the implementation of safe swallowing strategies without overt s/sx of penetration and/or aspiration during skilled therapy sessions in this certification period (to be achieved in 4-6 weeks).    Traditional VitalStim     Channel(s) used: 1, 2   Placement: 3a   Duty Cycle: 57/1 s   Frequency: 80 pulses per second   Phase Duration: 300 microseconds   Treatment Duration: 34 minutes   Min mA level: 8.0 mA   Max mA level: 11.0 mA      Patient tolerated NMES in conjunction with pharyngeal/laryngeal strengthening exercises and P.O. intake. (The patient received instruction on the potential " benefits from NMES treatment, including neuromuscular re-education and muscle strengthening.) Skin condition post-NMES: intact.      Patient consumed the following during today’s session: butterscotch pudding, strawberry NutriGrain bar, and water (thin liquid) via side of cup -- NMES remained on during pharyngeal strengthening exercises (see below).     Oral containment and bolus formation/control, AP transfer, and swallow initiation were judged to be WFL on pudding. Patient with increased difficulty with cohesive bolus formation and AP transfer on fruit bar due to xerostomia. Patient also noted globus sensation on the bar, but not the pudding. Reduced hyolaryngeal elevation and excursion noted upon palpation. Mild oral cavity residue was observed on fruit bar, but not the pudding. Patient with c/o moderate globus sensation and implemented liquid washes with double swallows to clear the sensation. Cough x2 and throat clear x2 noted on thin liquid during the session trials. Vocal quality remained at baseline status. No other overt s/sx of penetration/aspiration noted during today's session.     Patient will independently complete pharyngeal strengthening exercises (e.g., Effortful swallow, Mendelsohn, Evelia) daily to facilitate increased pharyngeal strength and coordination (to be achieved in 4-6 weeks).  -Patient completed the following pharyngeal strengthening exercises during today's session:   *Effortful swallow: 1 set of 10 reps  *Mendelsohn: 1 set of 10 reps  *Evelia: 1 set of 10 reps    Plan:  -Patient was provided with home exercises/activities to target goals in plan of care at the end of today's session.  -Continue with current plan of care.

## 2025-05-01 ENCOUNTER — OFFICE VISIT (OUTPATIENT)
Dept: SPEECH THERAPY | Facility: CLINIC | Age: 70
End: 2025-05-01
Payer: MEDICARE

## 2025-05-01 DIAGNOSIS — Z90.89 STATUS POST TONSILLECTOMY: Primary | ICD-10-CM

## 2025-05-01 DIAGNOSIS — C09.9 RIGHT TONSILLAR SQUAMOUS CELL CARCINOMA (HCC): ICD-10-CM

## 2025-05-01 DIAGNOSIS — R13.12 DYSPHAGIA, OROPHARYNGEAL PHASE: ICD-10-CM

## 2025-05-01 PROCEDURE — 92526 ORAL FUNCTION THERAPY: CPT | Performed by: SPEECH-LANGUAGE PATHOLOGIST

## 2025-05-02 ENCOUNTER — HOSPITAL ENCOUNTER (OUTPATIENT)
Dept: INFUSION CENTER | Facility: CLINIC | Age: 70
End: 2025-05-02
Attending: INTERNAL MEDICINE
Payer: MEDICARE

## 2025-05-02 VITALS
TEMPERATURE: 98.4 F | HEART RATE: 83 BPM | RESPIRATION RATE: 18 BRPM | DIASTOLIC BLOOD PRESSURE: 76 MMHG | SYSTOLIC BLOOD PRESSURE: 112 MMHG

## 2025-05-02 DIAGNOSIS — C18.9 METASTATIC COLON CANCER TO LIVER (HCC): Primary | ICD-10-CM

## 2025-05-02 DIAGNOSIS — C78.7 METASTATIC COLON CANCER TO LIVER (HCC): Primary | ICD-10-CM

## 2025-05-02 PROCEDURE — 96372 THER/PROPH/DIAG INJ SC/IM: CPT

## 2025-05-02 RX ADMIN — PEGFILGRASTIM 6 MG: 6 INJECTION SUBCUTANEOUS at 13:36

## 2025-05-02 NOTE — PROGRESS NOTES
Maira Moura  tolerated treatment well with no complications.      Maira Moura is aware of future appt on 5/5/25 at 1230.     AVS printed and given to Maira Moura:

## 2025-05-05 ENCOUNTER — HOSPITAL ENCOUNTER (OUTPATIENT)
Dept: INFUSION CENTER | Facility: CLINIC | Age: 70
Discharge: HOME | End: 2025-05-05
Attending: INTERNAL MEDICINE
Payer: MEDICARE

## 2025-05-05 VITALS
RESPIRATION RATE: 18 BRPM | HEART RATE: 99 BPM | SYSTOLIC BLOOD PRESSURE: 106 MMHG | TEMPERATURE: 96.5 F | DIASTOLIC BLOOD PRESSURE: 71 MMHG

## 2025-05-05 DIAGNOSIS — E86.0 DEHYDRATION: Primary | ICD-10-CM

## 2025-05-05 LAB
ALBUMIN SERPL BCG-MCNC: 3.4 G/DL (ref 3.5–5)
ALP SERPL-CCNC: 142 U/L (ref 34–104)
ALT SERPL W P-5'-P-CCNC: 8 U/L (ref 7–52)
ANION GAP SERPL CALCULATED.3IONS-SCNC: 5 MMOL/L (ref 4–13)
ANISOCYTOSIS BLD QL SMEAR: PRESENT
AST SERPL W P-5'-P-CCNC: 22 U/L (ref 13–39)
BASOPHILS # BLD MANUAL: 0 THOUSAND/UL (ref 0–0.1)
BASOPHILS NFR MAR MANUAL: 0 % (ref 0–1)
BILIRUB SERPL-MCNC: 0.37 MG/DL (ref 0.2–1)
BUN SERPL-MCNC: 32 MG/DL (ref 5–25)
CALCIUM ALBUM COR SERPL-MCNC: 9.9 MG/DL (ref 8.3–10.1)
CALCIUM SERPL-MCNC: 9.4 MG/DL (ref 8.4–10.2)
CHLORIDE SERPL-SCNC: 98 MMOL/L (ref 96–108)
CO2 SERPL-SCNC: 31 MMOL/L (ref 21–32)
CREAT SERPL-MCNC: 1.03 MG/DL (ref 0.6–1.3)
EOSINOPHIL # BLD MANUAL: 0.37 THOUSAND/UL (ref 0–0.4)
EOSINOPHIL NFR BLD MANUAL: 1 % (ref 0–6)
ERYTHROCYTE [DISTWIDTH] IN BLOOD BY AUTOMATED COUNT: 15.9 % (ref 11.6–15.1)
GFR SERPL CREATININE-BSD FRML MDRD: 55 ML/MIN/1.73SQ M
GLUCOSE SERPL-MCNC: 102 MG/DL (ref 65–140)
HCT VFR BLD AUTO: 28.5 % (ref 34.8–46.1)
HGB BLD-MCNC: 9.2 G/DL (ref 11.5–15.4)
LYMPHOCYTES # BLD AUTO: 0.74 THOUSAND/UL (ref 0.6–4.47)
LYMPHOCYTES # BLD AUTO: 2 % (ref 14–44)
MACROCYTES BLD QL AUTO: PRESENT
MAGNESIUM SERPL-MCNC: 2 MG/DL (ref 1.9–2.7)
MCH RBC QN AUTO: 31.7 PG (ref 26.8–34.3)
MCHC RBC AUTO-ENTMCNC: 32.3 G/DL (ref 31.4–37.4)
MCV RBC AUTO: 98 FL (ref 82–98)
MONOCYTES # BLD AUTO: 0 THOUSAND/UL (ref 0–1.22)
MONOCYTES NFR BLD: 0 % (ref 4–12)
NEUTROPHILS # BLD MANUAL: 35.65 THOUSAND/UL (ref 1.85–7.62)
NEUTS SEG NFR BLD AUTO: 97 % (ref 43–75)
PLATELET # BLD AUTO: 279 THOUSANDS/UL (ref 149–390)
PLATELET BLD QL SMEAR: ADEQUATE
PMV BLD AUTO: 11 FL (ref 8.9–12.7)
POIKILOCYTOSIS BLD QL SMEAR: PRESENT
POTASSIUM SERPL-SCNC: 3.8 MMOL/L (ref 3.5–5.3)
PROT SERPL-MCNC: 7.3 G/DL (ref 6.4–8.4)
RBC # BLD AUTO: 2.9 MILLION/UL (ref 3.81–5.12)
RBC MORPH BLD: PRESENT
SODIUM SERPL-SCNC: 134 MMOL/L (ref 135–147)
STOMATOCYTES BLD QL SMEAR: PRESENT
WBC # BLD AUTO: 36.75 THOUSAND/UL (ref 4.31–10.16)

## 2025-05-05 PROCEDURE — 80053 COMPREHEN METABOLIC PANEL: CPT | Performed by: INTERNAL MEDICINE

## 2025-05-05 PROCEDURE — 83735 ASSAY OF MAGNESIUM: CPT | Performed by: INTERNAL MEDICINE

## 2025-05-05 PROCEDURE — 96360 HYDRATION IV INFUSION INIT: CPT

## 2025-05-05 PROCEDURE — 85027 COMPLETE CBC AUTOMATED: CPT | Performed by: INTERNAL MEDICINE

## 2025-05-05 PROCEDURE — 85007 BL SMEAR W/DIFF WBC COUNT: CPT | Performed by: INTERNAL MEDICINE

## 2025-05-05 RX ADMIN — SODIUM CHLORIDE 1000 ML: 0.9 INJECTION, SOLUTION INTRAVENOUS at 12:44

## 2025-05-05 NOTE — PLAN OF CARE
Problem: Potential for Falls  Goal: Patient will remain free of falls  Description: INTERVENTIONS:- Educate patient/family on patient safety including physical limitations- Instruct patient to call for assistance with activity - Consult OT/PT to assist with strengthening/mobility - Keep Call bell within reach- Keep bed low and locked with side rails adjusted as appropriate- Keep care items and personal belongings within reach- Initiate and maintain comfort rounds- Make Fall Risk Sign visible to staff- Apply yellow socks and bracelet for high fall risk patients- Consider moving patient to room near nurses station  5/5/2025 1251 by Danielle Mace  Outcome: Progressing

## 2025-05-05 NOTE — PROGRESS NOTES
Patient presents for labs and hydration today and is without complaints at this time. Labs drawn per protocol. Previous labs reviewed, Magnesium level WDL. Patient tolerated NSS hydration without incident. Declines AVS. Aware of next appointment on 5/7 @ 1pm.

## 2025-05-06 ENCOUNTER — OFFICE VISIT (OUTPATIENT)
Dept: SPEECH THERAPY | Facility: CLINIC | Age: 70
End: 2025-05-06
Attending: INTERNAL MEDICINE
Payer: MEDICARE

## 2025-05-06 DIAGNOSIS — C09.9 RIGHT TONSILLAR SQUAMOUS CELL CARCINOMA (HCC): ICD-10-CM

## 2025-05-06 DIAGNOSIS — Z90.89 STATUS POST TONSILLECTOMY: Primary | ICD-10-CM

## 2025-05-06 DIAGNOSIS — R13.12 DYSPHAGIA, OROPHARYNGEAL PHASE: ICD-10-CM

## 2025-05-06 DIAGNOSIS — C18.9 METASTATIC COLON CANCER TO LIVER (HCC): Primary | ICD-10-CM

## 2025-05-06 DIAGNOSIS — C78.7 METASTATIC COLON CANCER TO LIVER (HCC): Primary | ICD-10-CM

## 2025-05-06 PROCEDURE — 92526 ORAL FUNCTION THERAPY: CPT

## 2025-05-06 RX ORDER — ATROPINE SULFATE 1 MG/ML
0.25 INJECTION, SOLUTION INTRAVENOUS ONCE AS NEEDED
Status: CANCELLED | OUTPATIENT
Start: 2025-05-14

## 2025-05-06 RX ORDER — ATROPINE SULFATE 1 MG/ML
0.25 INJECTION, SOLUTION INTRAVENOUS ONCE
Status: CANCELLED | OUTPATIENT
Start: 2025-05-14

## 2025-05-06 RX ORDER — SODIUM CHLORIDE 9 MG/ML
20 INJECTION, SOLUTION INTRAVENOUS ONCE
Status: CANCELLED | OUTPATIENT
Start: 2025-05-14

## 2025-05-06 NOTE — PROGRESS NOTES
Daily Speech Treatment Note    Today's date: 2025  Patient’s name: Maira Moura  : 1955  MRN: [id number]  Safety measures: HTN, CVA, GERD, active CA, s/p PEG tube placement, aspiration precautions  Current recommended diet: PEG tube for primary nutrition/hydration; mechanical soft with thin liquids P.O. as tolerated   Referring provider: Harika Medina DO Encounter Diagnosis     ICD-10-CM    1. Status post tonsillectomy  Z90.89       2. 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 Dysphagia, oropharyngeal phase  R13.12         Visit Tracking:  POC   Expires Auth Expiration Date ST Visit Limit   2025 or 10th visit 2025 [facility]              Visit/Unit Tracking:  Auth Status Date 25          Not required Used 1 2            Remaining 9 8             Subjective/Behavioral:    2 Egg omelet with cheese - 'went down OK' - got stuck a little   Ate not quite half    Objective/Assessment:  VBSS is scheduled 25 ([place])    Short-term goals:  Patient will receive a videofluoroscopic swallow study (VFSS) to assess her current swallowing function to r/o penetration or aspiration, to determine the safest, least restrictive diet, and to recommended the appropriate rehabilitation exercises (to be achieved in 2-3 weeks).      Repeat VBSS is scheduled 25 ([place])       Patient will tolerate P.O. trials of her recommended diet with the implementation of safe swallowing strategies without overt s/sx of penetration and/or aspiration during skilled therapy sessions in this certification period (to be achieved in 4-6 weeks).    Traditional VitalStim     Channel(s) used: 1, 2   Placement: 3a   Duty Cycle: 57/1 s   Frequency: 80 pulses per second   Phase Duration: 300 microseconds   Treatment Duration: 34 minutes   Min mA level: 5.5 mA   Max mA level: 5.5 mA (patient did want to increase mA today)      Patient tolerated NMES in conjunction with  pharyngeal/laryngeal strengthening exercises and P.O. intake. (The patient received instruction on the potential benefits from NMES treatment, including neuromuscular re-education and muscle strengthening.) Skin condition post-NMES: intact.      Patient consumed the following during today’s session: peach yogurt (small pieces of fruit inside), strawberry NutriGrain bar, and water (thin liquid) via side of cup -- NMES remained on during pharyngeal strengthening exercises (see below).     Oral containment and bolus formation/control, AP transfer, and swallow initiation were judged to be WFL on pudding. Patient with increased difficulty with cohesive bolus formation and AP transfer on fruit bar due to xerostomia. Patient also noted globus sensation on the bar, but not the pudding. Reduced hyolaryngeal elevation and excursion noted upon palpation. Mild oral cavity residue was observed on fruit bar, but not the pudding. Patient with c/o moderate globus sensation and implemented liquid washes with double swallows to clear the sensation. Vocal quality remained at baseline status. No other overt s/sx of penetration/aspiration noted during today's session.     Patient will independently complete pharyngeal strengthening exercises (e.g., Effortful swallow, Mendelsohn, Evelia) daily to facilitate increased pharyngeal strength and coordination (to be achieved in 4-6 weeks).  -Patient completed the following pharyngeal strengthening exercises during today's session:   *Effortful swallow: 1 set of 10 reps  *Mendelsohn: 1 set of 10 reps  *Evelia: 1 set of 10 reps    Plan:  -Patient was provided with home exercises/activities to target goals in plan of care at the end of today's session.  -Continue with current plan of care.

## 2025-05-07 ENCOUNTER — OFFICE VISIT (OUTPATIENT)
Dept: RADIATION ONCOLOGY | Facility: CLINIC | Age: 70
End: 2025-05-07
Attending: RADIOLOGY
Payer: MEDICARE

## 2025-05-07 ENCOUNTER — HOSPITAL ENCOUNTER (OUTPATIENT)
Dept: INFUSION CENTER | Facility: CLINIC | Age: 70
Discharge: HOME | End: 2025-05-07
Attending: INTERNAL MEDICINE
Payer: MEDICARE

## 2025-05-07 VITALS
HEART RATE: 86 BPM | TEMPERATURE: 97.8 F | DIASTOLIC BLOOD PRESSURE: 87 MMHG | SYSTOLIC BLOOD PRESSURE: 126 MMHG | RESPIRATION RATE: 16 BRPM

## 2025-05-07 VITALS
DIASTOLIC BLOOD PRESSURE: 82 MMHG | BODY MASS INDEX: 26.82 KG/M2 | HEART RATE: 80 BPM | WEIGHT: 161 LBS | SYSTOLIC BLOOD PRESSURE: 130 MMHG | RESPIRATION RATE: 16 BRPM | TEMPERATURE: 97 F | HEIGHT: 65 IN

## 2025-05-07 DIAGNOSIS — E86.0 DEHYDRATION: Primary | ICD-10-CM

## 2025-05-07 DIAGNOSIS — C18.9 METASTATIC COLON CANCER TO LIVER (HCC): ICD-10-CM

## 2025-05-07 DIAGNOSIS — C78.7 METASTATIC COLON CANCER TO LIVER (HCC): ICD-10-CM

## 2025-05-07 DIAGNOSIS — C09.9 RIGHT TONSILLAR SQUAMOUS CELL CARCINOMA (HCC): Primary | ICD-10-CM

## 2025-05-07 LAB
ALBUMIN SERPL BCG-MCNC: 3.5 G/DL (ref 3.5–5)
ALP SERPL-CCNC: 148 U/L (ref 34–104)
ALT SERPL W P-5'-P-CCNC: 8 U/L (ref 7–52)
ANION GAP SERPL CALCULATED.3IONS-SCNC: 7 MMOL/L (ref 4–13)
AST SERPL W P-5'-P-CCNC: 22 U/L (ref 13–39)
BASOPHILS # BLD AUTO: 0.07 THOUSANDS/ÂΜL (ref 0–0.1)
BASOPHILS NFR BLD AUTO: 0 % (ref 0–1)
BILIRUB SERPL-MCNC: 0.26 MG/DL (ref 0.2–1)
BUN SERPL-MCNC: 27 MG/DL (ref 5–25)
CALCIUM SERPL-MCNC: 9.3 MG/DL (ref 8.4–10.2)
CHLORIDE SERPL-SCNC: 100 MMOL/L (ref 96–108)
CO2 SERPL-SCNC: 29 MMOL/L (ref 21–32)
CREAT SERPL-MCNC: 1 MG/DL (ref 0.6–1.3)
EOSINOPHIL # BLD AUTO: 0.18 THOUSAND/ÂΜL (ref 0–0.61)
EOSINOPHIL NFR BLD AUTO: 1 % (ref 0–6)
ERYTHROCYTE [DISTWIDTH] IN BLOOD BY AUTOMATED COUNT: 16 % (ref 11.6–15.1)
GFR SERPL CREATININE-BSD FRML MDRD: 57 ML/MIN/1.73SQ M
GLUCOSE SERPL-MCNC: 96 MG/DL (ref 65–140)
HCT VFR BLD AUTO: 26.3 % (ref 34.8–46.1)
HGB BLD-MCNC: 8.8 G/DL (ref 11.5–15.4)
IMM GRANULOCYTES # BLD AUTO: 0.15 THOUSAND/UL (ref 0–0.2)
IMM GRANULOCYTES NFR BLD AUTO: 1 % (ref 0–2)
LYMPHOCYTES # BLD AUTO: 0.88 THOUSANDS/ÂΜL (ref 0.6–4.47)
LYMPHOCYTES NFR BLD AUTO: 5 % (ref 14–44)
MAGNESIUM SERPL-MCNC: 2 MG/DL (ref 1.9–2.7)
MCH RBC QN AUTO: 32.2 PG (ref 26.8–34.3)
MCHC RBC AUTO-ENTMCNC: 33.5 G/DL (ref 31.4–37.4)
MCV RBC AUTO: 96 FL (ref 82–98)
MONOCYTES # BLD AUTO: 0.81 THOUSAND/ÂΜL (ref 0.17–1.22)
MONOCYTES NFR BLD AUTO: 4 % (ref 4–12)
NEUTROPHILS # BLD AUTO: 16.79 THOUSANDS/ÂΜL (ref 1.85–7.62)
NEUTS SEG NFR BLD AUTO: 89 % (ref 43–75)
NRBC BLD AUTO-RTO: 0 /100 WBCS
PLATELET # BLD AUTO: 260 THOUSANDS/UL (ref 149–390)
PMV BLD AUTO: 10.5 FL (ref 8.9–12.7)
POTASSIUM SERPL-SCNC: 4.1 MMOL/L (ref 3.5–5.3)
PROT SERPL-MCNC: 7.3 G/DL (ref 6.4–8.4)
RBC # BLD AUTO: 2.73 MILLION/UL (ref 3.81–5.12)
SODIUM SERPL-SCNC: 136 MMOL/L (ref 135–147)
WBC # BLD AUTO: 18.88 THOUSAND/UL (ref 4.31–10.16)

## 2025-05-07 PROCEDURE — 99213 OFFICE O/P EST LOW 20 MIN: CPT | Performed by: RADIOLOGY

## 2025-05-07 PROCEDURE — 83735 ASSAY OF MAGNESIUM: CPT | Performed by: INTERNAL MEDICINE

## 2025-05-07 PROCEDURE — 85025 COMPLETE CBC W/AUTO DIFF WBC: CPT | Performed by: INTERNAL MEDICINE

## 2025-05-07 PROCEDURE — 80053 COMPREHEN METABOLIC PANEL: CPT | Performed by: INTERNAL MEDICINE

## 2025-05-07 PROCEDURE — 96360 HYDRATION IV INFUSION INIT: CPT

## 2025-05-07 RX ADMIN — SODIUM CHLORIDE 1000 ML: 0.9 INJECTION, SOLUTION INTRAVENOUS at 13:15

## 2025-05-07 NOTE — PROGRESS NOTES
Pt. Tolerated hydration without adverse event.  Pt. Will return 5/12/25 at 1300.  Pt. Is now labs Monday and Wednesday.  AVS declined.

## 2025-05-07 NOTE — ASSESSMENT & PLAN NOTE
Maira Moura is a 70 y.o. year old female with history of right breast carcinoma status post lumpectomy and radiation therapy in 2019.  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  She had cryoablation to the liver on Evelin 3, 2024.  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  She has been receiving treatment with 5-FU leucovorin and Nivolumab.  PET/CT study August 5, 2024 shows progression within the neck as well as the tonsil.  She was discussed with head and neck multidisciplinary case review on August 12, 2024 with recommendation to see Dr. Chapa August 16, 2024 and to consider radiation therapy along with chemotherapy.  She had previously been seen by dentistry/oral surgery and had dental extractions on September 1, 2023.  She has had a full upper denture in the last 10 years.  She had extractions of mandibular teeth that were in poor repair with 2 incisors that are remaining.   She was seen for consultation on August 20, 2024 for definitive radiation therapy to the head and neck region over 7 weeks/35 fractions with total dose of 7000 cGy along with concurrent chemotherapy with Dr. Diaz.  She declined upfront PEG tube placement and then developed mucositis such that PEG tube had to be placed during treatment.  She did have a nasopharynx/oropharynx biopsy on October 9, 2024 along with right and left tonsillectomy that were all negative for malignancy. She completed definitive treatment on November 13, 2024.      She is seen for follow-up examination today.  She has recovered well from treatment and has much less dysphagia.  She is using Magic mouthwash as needed as well as oxycodone as needed.  She remains on 6 cans of tube feeding daily along with hydration 3 days a week.  CT of the  chest, abdomen, and pelvis on December 3, 2024 revealed mixed interval changes in her hepatic metastasis with some areas slightly smaller and other areas that are enlarged.  She had a PET/CT study performed December 27, 2024 which revealed progression of disease within the liver.  There was a near complete response within the tonsil and neck.  She saw Dr. Medina and resumed chemotherapy with FOLFIRI in January 2025 which she has continued now for 5 cycles.  She will have repeat imaging studies after 6 cycles and then go back to see Dr. Medina to discuss treatment plan.  She does wish to continue with treatment.  We discussed that we would not recommend any radiation therapy.  She needs to continue with her systemic treatment.  She will follow-up with Dr. Medina as well as palliative care.  She will be seen here on a as needed basis.

## 2025-05-07 NOTE — PROGRESS NOTES
Pt. Denies new symptoms or concerns today. CBC, CMP and Magnesium obtained as ordered. Hydration ordered today. Magnesium > 1.9.

## 2025-05-07 NOTE — PROGRESS NOTES
Maira Moura 1955 is a 70 y.o. female With history of malignant neoplasm of right breast and metastatic colon cancer to liver. 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 She was last seen 12/20/24.      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, unresectable  7/31/2023 - FOLFOX  11/20/2023 - opdivo added to FOLFOX  12/18/2023-cycle 11-20% dose reduction of 5-FU bolus and oxaliplatin due to increased fatigue  Jan 2024 - oxaliplatin removed from treatment plan due to neuropathy - PET scan shows good disease control in all areas except colon lesion  3/2024 - right hemicolectomy - pT3N0  6/3/24 - cryoablation to liver (no interruption to systemic therapy)  7/30/2024 - stop folfox  9/2024 - 10/2024 - cis/RT to tonsils and cervical Lns  12/2024 - disease progression of colon cancer in the liver  01/2025 - Initiated FOLFIRI every 14-days, with 50% dose-reduction of Irinotecan due to pre-existing diarrhea.  February 2025: systemic chemotherapy held due to patient experiencing prolonged side effects of diarrhea and neutropenia.   March 2025: resume C3 of FOLFIRI       12/27/24 PET scan-  1. Interval progression of hypermetabolic malignancy/metastatic disease involving the liver.  2. Interval near complete metabolic response to therapy of previously noted hypermetabolic malignancy/metastatic disease involving the neck.  3. Essentially stable large right adnexal mass with low-level associated FDG activity.  4. 3 mm right lung nodule, new since prior PET/CT. Short interval follow-up with CT of chest in 3 months is recommended to ensure stability and exclude developing pulmonary malignancy/metastatic disease.      12/31/24 Dr. Medina- Clear progression of colon cancer in liver on PET. FOLFIRI with 50% dose reduction of  the irinotecan because of pre-existing diarrhea. Cholestyramine TID and Imodium. Continue hydration 3 times a week    1/30/25 Dr. Matthew- Tonsillar fossa is still healing. Evidence of thrush. Will continue Nystatin. F/u 1-2 months    2/4/25 Dr. Medina- due for cycle 3 of FOLFIRI which has been poorly tolerated. Significant diarrhea. Neutropenic. Hold this cycle and f/u in 2 weeks.    2/18/25 Dr. Medina- Give patient another 2 weeks off from chemo. New CT scan during treatment break. Discussed hospice care if chemo adversely affects QOL      2/24/25 CT C/A/P-  1.  Interval progression of hepatic metastatic disease with increase in size and number of lesions.  2.  Slight increase in size of a right lower lobe pulmonary nodule. There are also a few new tiny pulmonary nodules measuring up to 3 mm. This is suspicious for metastatic disease. Recommend attention on 3-month follow-up.  3.  Right thyroid nodule measuring 1.8 cm. Consider correlation with thyroid ultrasound.      2/28/25 Dr. Medina- Restart chemo with 50% dose reduction of irinotecan and remove 5-FU bolus and leucovorin. CT with disease progression    3/18/25 Dr. Medina- Did well with restart of FOLFIRI. Continue without adjustments    4/1/25 Dr. Medina- Proceed with cycle 4, doing remarkably well. Continue seeing every 2 weeks. CT scan after 6 cycles      4/24/25 speech therapy  Encouraged to drink after eating , to help wash food down. She will continue oral motor exercises to help with swallowing, she met goal of 25%. She will continue with effortful swallowing, and Mendelsohn exercises.       4/29/25 Speech therapy  She is tolerating 3 meals a day, with tube feedings. She follows a puree and mechanically soft diet. Globus sensation with denser foods. Reports dry mouth and reduced appetite. Continue with speech therapy.     4/29/25 Dr. Medina  Continue with cycle 5 FOLFIRI, patient is tolerating well.Hydration on Monday and Friday. After cycle 6  "CT ordered.     4/30/25 palliative care  She will try dronabinol to help with appetite. She is taking tylenol for neck and throat pain.     Upcoming  5/8/25 Speech  5/12/25 Infusion  5/13/25  Medical Oncology          Follow up visit     Oncology History   Malignant neoplasm of right female breast (HCC)   11/23/2018 Initial Diagnosis    Malignant neoplasm of right female breast (HCC)     11/23/2018 Biopsy    Rt Breast US BX 1100 8cmfn, 4 passes 12g Marquee:  - Invasive breast carcinoma of no special type (ductal NST/invasive ductal carcinoma).  - grade 2  - %  - MN 95%  - HER2 negative     12/20/2018 Surgery    Right partial mastectomy and SLN biopsy:  Infiltrating ductal carcinoma, Grade 2/3, felt to be between 2.0 cm and 2.5 cm in greatest dimension  - No invasive tumor is seen at inked margins, but there is a focus of DCIS seen within approximately 1mm of the inked medial margin  - no LVI  - no LCIS  - 0/2 LN's  at least Stage IIA - pT2, pN0, cM0, G2.    - Dr Coronado     12/20/2018 -  Cancer Staged    Stage IIA - pT2, pN0, cM0, G2.       1/4/2019 Genomic Testing    Oncotype DX score: 13     2/1/2019 -  Hormone Therapy    anastrozole 1 mg daily as adjuvant endocrine therapy    - Dr Daley       2/14/2019 - 4/3/2019 Radiation    Course: C1    Plan ID Energy Fractions Dose per Fraction (cGy) Dose Correction (cGy) Total Dose Delivered (cGy) Elapsed Days   R Breast 10X/6X 25 / 25 200 0 5,000 40   R BRST BOOST 16E 5 / 5 200 0 1,000 7      Treatment dates:  C1: 2/14/2019 - 4/3/2019       Metastatic colon cancer to liver (HCC)   7/6/2023 Genetic Testing    CARIS Results:   KRAS Pathogenic Variant Exon 2  p.G12D : lack of benefit of cetuximab, panitumumab Level 2   No other actionable mutation; MSI stable; TMB low   MiFOLOXAi Results: \"Decreased Benefit of FOLFOX + Bevacizumab in first line metastatic CRC.  This patient may achieve improved results by receiving an alternative to FOLFOX, such as FOLFIRI, as " "their initial regimen. As an adjustment to frontline FOLFOXIRI following toxicity: This patient may achieve improved results by removing the oxaliplatin portion of their regimen\".         7/11/2023 Initial Diagnosis    Metastatic colon cancer to liver (HCC)     7/13/2023 -  Cancer Staged    Staging form: Colon and Rectum, AJCC 8th Edition  - Clinical stage from 7/13/2023: cT3, cN0, pM1 - Signed by Harika Medina DO on 9/28/2023  Total positive nodes: 0       7/31/2023 - 8/1/2024 Chemotherapy    cyanocobalamin, 1,000 mcg, Intramuscular, Every 30 days, 7 of 9 cycles  Administration: 1,000 mcg (5/9/2024), 1,000 mcg (5/24/2024), 1,000 mcg (6/20/2024), 1,000 mcg (7/3/2024), 1,000 mcg (7/18/2024), 1,000 mcg (8/1/2024)  potassium chloride, 20 mEq, Intravenous, Once, 2 of 2 cycles  Administration: 20 mEq (11/6/2023), 20 mEq (11/20/2023)  alteplase (CATHFLO), 2 mg, Intracatheter, Every 1 Minute as needed, 21 of 23 cycles  Administration: 2 mg (1/3/2024)  pegfilgrastim (NEULASTA), 6 mg, Subcutaneous, Once, 12 of 12 cycles  Administration: 6 mg (10/25/2023), 6 mg (11/8/2023), 6 mg (11/22/2023), 6 mg (12/6/2023), 6 mg (12/20/2023), 6 mg (1/12/2024), 6 mg (1/25/2024), 6 mg (4/25/2024), 6 mg (5/9/2024), 6 mg (5/24/2024), 6 mg (6/20/2024)  fluorouracil (ADRUCIL), 895 mg, Intravenous, Once, 21 of 23 cycles  Dose modification: 320 mg/m2 (original dose 400 mg/m2, Cycle 11)  Administration: 900 mg (7/31/2023), 900 mg (8/14/2023), 900 mg (8/28/2023), 900 mg (9/11/2023), 900 mg (9/25/2023), 900 mg (10/9/2023), 900 mg (10/23/2023), 895 mg (11/6/2023), 895 mg (11/20/2023), 895 mg (12/4/2023), 700 mg (12/18/2023), 680 mg (1/10/2024), 680 mg (1/23/2024), 625 mg (4/23/2024), 625 mg (5/7/2024), 625 mg (5/22/2024), 625 mg (6/4/2024), 625 mg (6/18/2024), 625 mg (7/1/2024), 625 mg (7/16/2024), 625 mg (7/30/2024)  nivolumab (OPDIVO) IVPB, 240 mg (100 % of original dose 240 mg), Intravenous, Once, 13 of 15 cycles  Dose modification: 240 mg " (original dose 240 mg, Cycle 9)  Administration: 240 mg (11/20/2023), 240 mg (12/4/2023), 240 mg (12/18/2023), 240 mg (1/10/2024), 240 mg (1/23/2024), 240 mg (4/23/2024), 240 mg (5/7/2024), 240 mg (5/22/2024), 240 mg (6/4/2024), 240 mg (6/18/2024), 240 mg (7/1/2024), 240 mg (7/16/2024), 240 mg (7/30/2024)  leucovorin calcium IVPB, 896 mg, Intravenous, Once, 21 of 23 cycles  Administration: 900 mg (7/31/2023), 900 mg (8/14/2023), 900 mg (8/28/2023), 900 mg (9/11/2023), 900 mg (9/25/2023), 900 mg (10/9/2023), 900 mg (10/23/2023), 900 mg (11/6/2023), 900 mg (11/20/2023), 900 mg (12/4/2023), 900 mg (12/18/2023), 850 mg (1/10/2024), 850 mg (1/23/2024), 800 mg (4/23/2024), 800 mg (5/7/2024), 800 mg (5/22/2024), 800 mg (6/4/2024), 800 mg (6/18/2024), 800 mg (7/1/2024), 800 mg (7/16/2024), 800 mg (7/30/2024)  oxaliplatin (ELOXATIN) chemo infusion, 85 mg/m2 = 190.4 mg, Intravenous, Once, 12 of 12 cycles  Dose modification: 68 mg/m2 (original dose 85 mg/m2, Cycle 11, Reason: Other (Must fill in a comment), Comment: increased fatigue)  Administration: 190.4 mg (7/31/2023), 190.4 mg (8/14/2023), 200 mg (8/28/2023), 200 mg (9/11/2023), 200 mg (9/25/2023), 200 mg (10/9/2023), 200 mg (10/23/2023), 200 mg (11/6/2023), 200 mg (11/20/2023), 190.4 mg (12/4/2023), 150 mg (12/18/2023), 150 mg (1/10/2024)  fluorouracil (ADRUCIL) ambulatory infusion Soln, 1,200 mg/m2/day = 5,375 mg, Intravenous, Over 46 hours, 21 of 23 cycles     9/26/2023 -  Cancer Staged    Staging form: Colon and Rectum, AJCC 8th Edition  - Pathologic: pT3, pN0, cM1 - Signed by Vickie Israel MD on 4/3/2024  Total positive nodes: 0       3/12/2024 Surgery    A. Terminal ileum, appendix, right colon (right hemicolectomy):  - Adenocarcinoma (5.2cm)  - Forty-nine (49) lymph nodes negative for carcinoma (0/49)    - Acellular mucin present in one (1) lymph node   - See staging synoptic (ypT3N0)     1/8/2025 -  Chemotherapy    fluorouracil (ADRUCIL), 400 mg/m2 = 770 mg, 2  of 2 cycles  Administration: 770 mg (1/8/2025), 770 mg (1/22/2025)  irinotecan (CAMPTOSAR) chemo infusion, 173 mg (50 % of original dose 180 mg/m2), 7 of 11 cycles  Dose modification: 90 mg/m2 (original dose 180 mg/m2, Cycle 1, Reason: Anticipated Tolerance, Comment: 50% dose reduction due to pre-existing diarrhea)  Administration: 180 mg (1/8/2025), 180 mg (1/22/2025), 160 mg (3/5/2025), 160 mg (3/19/2025), 160 mg (4/2/2025), 160 mg (4/16/2025), 160 mg (4/30/2025)  leucovorin calcium IVPB, 768 mg, 2 of 2 cycles  Administration: 800 mg (1/8/2025), 800 mg (1/22/2025)  fluorouracil (ADRUCIL) ambulatory infusion Soln, 1,200 mg/m2/day = 4,610 mg, 7 of 11 cycles     Right tonsillar squamous cell carcinoma (HCC)   7/27/2023 Initial Diagnosis    Right tonsillar squamous cell carcinoma (HCC)     7/27/2023 -  Cancer Staged    Staging form: Pharynx - Oropharynx, AJCC 8th Edition  - Clinical stage from 7/27/2023: Stage III (cT1, cN3, cM0, p16+) - Signed by Evan Plummer MD on 7/27/2023  Stage prefix: Initial diagnosis       9/16/2024 - 11/13/2024 Radiation      Plan ID Energy Fractions Dose per Fraction (cGy) Dose Correction (cGy) Total Dose Delivered (cGy) Elapsed Days   Head_Neck 6X-FFF 35 / 35 200 0 7,000 58    C2: 9/16/2024 - 11/13/2024 9/17/2024 - 10/21/2024 Chemotherapy    alteplase (CATHFLO), 2 mg, Intracatheter, Every 1 Minute as needed, 6 of 6 cycles  Administration: 2 mg (10/21/2024)  CISplatin (PLATINOL) infusion, 70 mg (original dose 40 mg/m2), Intravenous, Once, 6 of 6 cycles  Dose modification: 70 mg (original dose 40 mg/m2, Cycle 1, Reason: Anticipated Tolerance), 30 mg/m2 (original dose 40 mg/m2, Cycle 4, Reason: Neuropathy, Comment: dose reduction to 30 mg/m2 due to neuropathy)  Administration: 70 mg (9/17/2024), 70 mg (9/23/2024), 70 mg (9/30/2024), 59.1 mg (10/7/2024), 59.1 mg (10/14/2024), 59.1 mg (10/21/2024)  sodium chloride, 500 mL, Intravenous, Once, 6 of 6 cycles  Administration: 500 mL  (9/17/2024), 500 mL (9/17/2024), 500 mL (9/23/2024), 500 mL (9/23/2024), 500 mL (9/30/2024), 500 mL (9/30/2024), 500 mL (10/7/2024), 500 mL (10/7/2024), 500 mL (10/14/2024), 500 mL (10/14/2024), 500 mL (10/21/2024)  fosaprepitant (EMEND) IVPB, 150 mg, Intravenous, Once, 6 of 6 cycles  Administration: 150 mg (9/17/2024), 150 mg (9/23/2024), 150 mg (9/30/2024), 150 mg (10/7/2024), 150 mg (10/14/2024), 150 mg (10/21/2024)  IVPB builder, , Intravenous, Once, 1 of 1 cycle  Administration: Unknown dose (10/21/2024)         Review of Systems:  Review of Systems   Constitutional:  Positive for appetite change (decreased r/t pain with swallowing).   HENT:  Positive for dental problem and trouble swallowing (soreness).    Respiratory: Negative.     Musculoskeletal: Negative.    Skin: Negative.    Neurological:  Positive for headaches (intermittent).   Psychiatric/Behavioral: Negative.         Health Maintenance   Topic Date Due    Medicare Annual Wellness Visit (AWV)  Never done    COVID-19 Vaccine (1) Never done    RSV Vaccine for Pregnant Patients and Patients Age 60+ Years (1 - Risk 60-74 years 1-dose series) Never done    Zoster Vaccine (1 of 2) 07/04/2018    Falls: Plan of Care  Never done    PT PLAN OF CARE  05/14/2021    Pneumococcal Vaccine: 65+ Years (3 of 3 - PCV) 09/22/2022    Breast Cancer Screening: Mammogram  11/08/2023    Urinary Incontinence Screening  10/09/2024    SLP PLAN OF CARE  05/29/2025    Influenza Vaccine (Season Ended) 09/01/2025    Depression Screening  12/20/2025    Fall Risk  03/11/2026    BMI: Followup Plan  04/30/2026    BMI: Adult  04/30/2026    Hepatitis C Screening  Completed    Osteoporosis Screening  Completed    Meningococcal B Vaccine  Aged Out    RSV Vaccine age 0-20 Months  Aged Out    HIB Vaccine  Aged Out    IPV Vaccine  Aged Out    Hepatitis A Vaccine  Aged Out    Meningococcal ACWY Vaccine  Aged Out    HPV Vaccine  Aged Out    Colorectal Cancer Screening  Discontinued     Patient  Active Problem List   Diagnosis    Primary hypertension    Mixed hyperlipidemia    Malignant neoplasm of right female breast (HCC)    Vitamin D deficiency    Prediabetes    Right thyroid nodule    GERD (gastroesophageal reflux disease)    Obesity    Metastatic colon cancer to liver (HCC)    Right tonsillar squamous cell carcinoma (HCC)    Poor appetite    Nutritional anemia    Dehydration    Drug-induced constipation    Chemotherapy induced neutropenia (HCC)    Stage 3a chronic kidney disease (HCC)    Thrombocytopenia (HCC)    Neuropathy    Diastolic dysfunction    Hypokalemia    Leukemoid reaction    GOGO (acute kidney injury) (HCC)    Acute post-operative pain    At risk for constipation    At risk for delirium    Palliative care encounter    Physical deconditioning    History of CVA (cerebrovascular accident)    Chronic nasal congestion    Elevated LFTs    Vitamin B12 deficiency    Neoplastic malignant related fatigue    Sensation, choking    S/P percutaneous endoscopic gastrostomy (PEG) tube placement (HCC)    Pancytopenia due to chemotherapy (HCC)    Cancer related pain    Hypomagnesemia    Functional diarrhea    Antineoplastic chemotherapy induced anemia    Hypertensive heart and renal disease with congestive heart failure (HCC)     Past Medical History:   Diagnosis Date    Anemia     Arthritis     Body mass index (BMI) 40.0-44.9, adult (HCC) 9/22/2023    Breast cancer (HCC) 12/17/2018    Cancer (HCC)     right breast, colon, liver, right tonsil    Cervical lymphadenopathy 3/21/2023    CT neck on 3/30/2023- Large right level 2A lymphadenopathy as described above and suspicious for neoplasm.  Correlation with histopathology is recommended.  Mild asymmetric prominence of the right palatine tonsil with otherwise no definitive nodular enhancing lesions identified along the course of the aerodigestive tract.     5/26/23- FNA of this node was nonrevealing for tissue etiology, but it d    Encounter for screening for  HIV 2022    Follow-up examination 2023    GERD (gastroesophageal reflux disease)     Hyperlipidemia     Hypertension     Obesity     Stroke (HCC)     TIA     Stroke (HCC)     TIA     Transaminitis 2021    Vasomotor rhinitis 2018    Refilled flonase today. Stopped taking since 2022     Past Surgical History:   Procedure Laterality Date    BREAST BIOPSY Right 2018    us guided bx cancer    BREAST SURGERY      COLONOSCOPY      ESOPHAGOSCOPY N/A 2023    Procedure: ESOPHAGOSCOPY;  Surgeon: David Chapa MD;  [facility];  Service: ENT    HEMICOLOECTOMY W/ ANASTOMOSIS Right 3/12/2024    Procedure: RIGHT COLECTOMY WITH ROBOTICS;  Surgeon: Vickie Israel MD;  Location: [facility];  Service: Surgical Oncology    IR BIOPSY LIVER MASS  2023    IR CRYOABLATION  6/3/2024    IR GASTROSTOMY (G) TUBE CHECK/CHANGE/REINSERTION/UPSIZE  2025    IR GASTROSTOMY TUBE PLACEMENT  10/2/2024    IR PORT CHECK  2024    IR PORT PLACEMENT  2023    IR PORT STRIPPING  2024    LAPAROTOMY N/A 3/12/2024    Procedure: LAPAROTOMY EXPLORATORY W/ ROBOTICS;  Surgeon: Vickie Israel MD;  [facility];  Service: Surgical Oncology    PA BIOPSY NASOPHARYNX VISIBLE LESION SIMPLE N/A 10/9/2024    Procedure: NASOPHARYNGOSCOPY with biospy;  Surgeon: Juanito Matthew MD;  [facility];  Service: ENT    PA BRNCHSC INCL FLUOR GDNCE DX W/CELL WASHG SPX N/A 2023    Procedure: BRONCHOSCOPY;  Surgeon: David Chapa MD;  [facility];  Service: ENT    PA LARYNGOSCOPY W/BIOPSY MICROSCOPE/TELESCOPE N/A 2023    Procedure: MICRODIRECT LARYNGOSCOPY WITH BIOPSY;  Surgeon: David Chapa MD;  [facility];  Service: ENT    PA LARYNGOSCOPY W/WO TRACHEOSCOPY DX EXCEPT  N/A 10/9/2024    Procedure: DIRECT LARYNGOSCOPY;  Surgeon: Juanito Matthew MD;  [facility];  Service: ENT    PA MASTECTOMY PARTIAL W/AXILLARY LYMPHADENECTOMY  Right 12/20/2018    Procedure: ULTRASOUND LOCALIZED PARTIAL MASTECTOMY W/SENTINEL NODE BIOPSY POSS AXILLARY DISSECTION;  Surgeon: Zahida Coronado MD;  Location: [facility];  Service: General    NJ TONSILLECTOMY PRIMARY/SECONDARY AGE 12/> N/A 10/9/2024    Procedure: TONSILLECTOMY;  Surgeon: Juanito Matthew MD;  [facility];  Service: ENT    TUBAL LIGATION      US GUIDED BREAST BIOPSY RIGHT COMPLETE Right 11/23/2018    US GUIDED INJECTION FOR RESEARCH STUDY  12/20/2018    US GUIDED INJECTION FOR RESEARCH STUDY  12/07/2018    US GUIDED INJECTION FOR RESEARCH STUDY  05/03/2019    US GUIDED LYMPH NODE BIOPSY RIGHT  05/26/2023     Family History   Problem Relation Age of Onset    Colon cancer Mother 58    Hypertension Mother     Pancreatic cancer Father 60    Hypertension Daughter     Hypertension Son      Social History     Socioeconomic History    Marital status: Single     Spouse name: Not on file    Number of children: Not on file    Years of education: Not on file    Highest education level: Not on file   Occupational History    Not on file   Tobacco Use    Smoking status: Never     Passive exposure: Never    Smokeless tobacco: Never    Tobacco comments:     NO TOBACCO USE   Vaping Use    Vaping status: Never Used   Substance and Sexual Activity    Alcohol use: Never    Drug use: Never    Sexual activity: Not on file   Other Topics Concern    Not on file   Social History Narrative    Not on file     Social Drivers of Health     Financial Resource Strain: Low Risk  (10/9/2023)    Overall Financial Resource Strain (CARDIA)     Difficulty of Paying Living Expenses: Not hard at all   Food Insecurity: No Food Insecurity (10/31/2024)    Hunger Vital Sign     Worried About Running Out of Food in the Last Year: Never true     Ran Out of Food in the Last Year: Never true   Transportation Needs: No Transportation Needs (10/31/2024)    PRAPARE - Transportation     Lack of Transportation (Medical): No     Lack  of Transportation (Non-Medical): No   Recent Concern: Transportation Needs - Unmet Transportation Needs (10/29/2024)    Nursing - Transportation Risk Classification     Lack of Transportation: Not on file     Lack of Transportation: Yes   Physical Activity: Not on file   Stress: Not on file   Social Connections: Not on file   Intimate Partner Violence: Unknown (10/29/2024)    Nursing IPS     Feels Physically and Emotionally Safe: Not on file     Physically Hurt by Someone: Not on file     Humiliated or Emotionally Abused by Someone: Not on file     Physically Hurt by Someone: No     Hurt or Threatened by Someone: No   Housing Stability: Low Risk  (10/31/2024)    Housing Stability Vital Sign     Unable to Pay for Housing in the Last Year: No     Number of Times Moved in the Last Year: 0     Homeless in the Last Year: No       Current Outpatient Medications:     acetaminophen (TYLENOL) 160 mg/5 mL liquid, Take 20 mL (640 mg total) by mouth every 6 (six) hours as needed for mild pain for up to 10 days, Disp: 473 mL, Rfl: 3    anastrozole (ARIMIDEX) 1 mg tablet, Take 1 tablet (1 mg total) by mouth daily, Disp: 90 tablet, Rfl: 0    atorvastatin (LIPITOR) 40 mg tablet, Take 1 tablet (40 mg total) by mouth daily at bedtime, Disp: 30 tablet, Rfl: 2    cholestyramine (QUESTRAN) 4 g packet, Take 1 packet (4 g total) by mouth 3 (three) times a day with meals, Disp: 90 packet, Rfl: 3    diphenhydramine, lidocaine, Al/Mg hydroxide, simethicone (Magic Mouthwash) SUSP, Swish and swallow 10 mL every 4 (four) hours as needed for mouth pain or discomfort (Patient not taking: Reported on 1/31/2025), Disp: 480 mL, Rfl: 2    docusate sodium (COLACE) 50 mg capsule, Take 1 capsule (50 mg total) by mouth 2 (two) times a day, Disp: 90 capsule, Rfl: 1    dronabinol (MARINOL) 2.5 mg capsule, Take 1 capsule (2.5 mg total) by mouth in the morning, Disp: 5 capsule, Rfl: 0    ergocalciferol (VITAMIN D2) 50,000 units, Take 1 capsule (50,000 Units  total) by mouth once a week, Disp: 90 capsule, Rfl: 0    [START ON 5/14/2025] fluorouracil 4,390 mg in CADD/Elastomeric Infusion Device, Infuse 4,390 mg (1,200 mg/m2/day x 1.83 m2) into a catheter in a vein via infusion device over 46 hours for 2 days  Infusion planned for May 14, 2025., Disp: 1 each, Rfl: 0    folic acid (FOLVITE) 1 mg tablet, Take 1 tablet (1 mg total) by mouth in the morning, Disp: 90 tablet, Rfl: 3    gabapentin (NEURONTIN) 300 mg capsule, Take 1 pills in the morning, 1 pill at lunch and 2 pills before bed, Disp: 120 capsule, Rfl: 0    hydrocortisone 1 % ointment, , Disp: , Rfl:     ibuprofen (MOTRIN) 100 mg/5 mL suspension, Take 20 mL (400 mg total) by mouth every 6 (six) hours as needed for moderate pain (Patient not taking: Reported on 1/31/2025), Disp: 473 mL, Rfl: 0    lidocaine-prilocaine (EMLA) cream, Apply topically as needed for mild pain (Patient not taking: Reported on 12/6/2024), Disp: 30 g, Rfl: 3    losartan (COZAAR) 50 mg tablet, Take 1 tablet (50 mg total) by mouth daily, Disp: 90 tablet, Rfl: 2    magnesium Oxide (MAG-OX) 400 mg TABS, 1 tablet (400 mg total) by Per G Tube route 2 (two) times a day (Patient not taking: Reported on 1/31/2025), Disp: 60 tablet, Rfl: 0    mirtazapine (REMERON) 15 mg tablet, Take 1 tablet (15 mg total) by mouth daily at bedtime Patient is also on a 30 mg tablet, for a total dose of 45 mg, Disp: 30 tablet, Rfl: 0    mirtazapine (REMERON) 30 mg tablet, Take 1 tablet (30 mg total) by mouth daily at bedtime, Disp: 30 tablet, Rfl: 0    nystatin (MYCOSTATIN) 500,000 units/5 mL suspension, Apply 5 mL (500,000 Units total) to the mouth or throat 4 (four) times a day, Disp: 600 mL, Rfl: 3    omeprazole (PriLOSEC) 20 mg delayed release capsule, Take 1 capsule (20 mg total) by mouth daily, Disp: 30 capsule, Rfl: 3    ondansetron (ZOFRAN) 8 mg tablet, Take 1 tablet (8 mg total) by mouth every 8 (eight) hours as needed for nausea or vomiting, Disp: 90 tablet,  "Rfl: 2    oxyCODONE (ROXICODONE) 5 mg/5 mL solution, Take 5 mL (5 mg total) by mouth every 6 (six) hours as needed for severe pain ((breakthrough pain)) Max Daily Amount: 20 mg, Disp: 473 mL, Rfl: 0    potassium chloride (Klor-Con M20) 20 mEq tablet, Take 1 tablet (20 mEq total) by mouth 2 (two) times a day (Patient not taking: Reported on 1/31/2025), Disp: 90 tablet, Rfl: 1    potassium chloride 10% oral solution, 15 mL (20 mEq total) by Per G Tube route 2 (two) times a day, Disp: 473 mL, Rfl: 0    prochlorperazine (COMPAZINE) 10 mg tablet, Take 1 tablet (10 mg total) by mouth every 6 (six) hours as needed for nausea or vomiting, Disp: 90 tablet, Rfl: 2    pyridoxine (VITAMIN B6) 50 mg tablet, Take 1 tablet (50 mg total) by mouth daily, Disp: 90 tablet, Rfl: 3    vitamin B-12 (VITAMIN B-12) 1,000 mcg tablet, , Disp: , Rfl:   No current facility-administered medications for this visit.    Facility-Administered Medications Ordered in Other Visits:     alteplase (CATHFLO) injection 2 mg, 2 mg, Intracatheter, Q1MIN PRN, Harika Agostino, DO    alteplase (CATHFLO) injection 2 mg, 2 mg, Intracatheter, Q1MIN PRN, Harika Agostino, DO  No Known Allergies  Vitals:    05/07/25 1422   TempSrc: Temporal   Weight: 73 kg (161 lb)   Height: 5' 5\" (1.651 m)         "

## 2025-05-07 NOTE — PLAN OF CARE
Problem: INFECTION - ADULT  Goal: Absence or prevention of progression during hospitalization  Description: INTERVENTIONS:- Assess and monitor for signs and symptoms of infection- Monitor lab/diagnostic results- Monitor all insertion sites, i.e. indwelling lines, tubes, and drains- Monitor endotracheal if appropriate and nasal secretions for changes in amount and color- [place] appropriate cooling/warming therapies per order- Administer medications as ordered- Instruct and encourage patient and family to use good hand hygiene technique- Identify and instruct in appropriate isolation precautions for identified infection/condition  Outcome: Progressing  Goal: Absence of fever/infection during neutropenic period  Description: INTERVENTIONS:- Monitor WBC  Outcome: Progressing     Problem: Knowledge Deficit  Goal: Patient/family/caregiver demonstrates understanding of disease process, treatment plan, medications, and discharge instructions  Description: Complete learning assessment and assess knowledge base.Interventions:- Provide teaching at level of understanding- Provide teaching via preferred learning methods  Outcome: Progressing

## 2025-05-07 NOTE — PROGRESS NOTES
Follow-up Visit   Name: Maira Moura      : 1955      MRN: [id number]  Encounter Provider: Sami Tolbert MD  Encounter Date: 2025   Encounter department: On license of [facility] RADIATION ONCOLOGY  :  Assessment & Plan  Right tonsillar squamous cell carcinoma (HCC)       Maira Moura is a 70 y.o. year old female with history of right breast carcinoma status post lumpectomy and radiation therapy in 2019.  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  She had cryoablation to the liver on Evelin 3, 2024.  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  She has been receiving treatment with 5-FU leucovorin and Nivolumab.  PET/CT study 2024 shows progression within the neck as well as the tonsil.  She was discussed with head and neck multidisciplinary case review on 2024 with recommendation to see Dr. Chapa 2024 and to consider radiation therapy along with chemotherapy.  She had previously been seen by dentistry/oral surgery and had dental extractions on 2023.  She has had a full upper denture in the last 10 years.  She had extractions of mandibular teeth that were in poor repair with 2 incisors that are remaining.   She was seen for consultation on 2024 for definitive radiation therapy to the head and neck region over 7 weeks/35 fractions with total dose of 7000 cGy along with concurrent chemotherapy with Dr. Diaz.  She declined upfront PEG tube placement and then developed mucositis such that PEG tube had to be placed during treatment.  She did have a nasopharynx/oropharynx biopsy on 2024 along with right and left tonsillectomy that were all negative for malignancy. She completed definitive treatment  on November 13, 2024.      She is seen for follow-up examination today.  She has recovered well from treatment and has much less dysphagia.  She is using Magic mouthwash as needed as well as oxycodone as needed.  She remains on 6 cans of tube feeding daily along with hydration 3 days a week.  CT of the chest, abdomen, and pelvis on December 3, 2024 revealed mixed interval changes in her hepatic metastasis with some areas slightly smaller and other areas that are enlarged.  She had a PET/CT study performed December 27, 2024 which revealed progression of disease within the liver.  There was a near complete response within the tonsil and neck.  She saw Dr. Medina and resumed chemotherapy with FOLFIRI in January 2025 which she has continued now for 5 cycles.  She will have repeat imaging studies after 6 cycles and then go back to see Dr. Medina to discuss treatment plan.  She does wish to continue with treatment.  We discussed that we would not recommend any radiation therapy.  She needs to continue with her systemic treatment.  She will follow-up with Dr. Medina as well as palliative care.  She will be seen here on a as needed basis.    Metastatic colon cancer to liver (HCC)       See Above        History of Present Illness   Chief Complaint   Patient presents with   • Follow-up     Radiation oncology     Pertinent Medical History   Maira Moura 1955 is a 70 y.o. female With history of malignant neoplasm of right breast and metastatic colon cancer to liver. 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 She was last seen 12/20/24.        2/2019 - pT2N0 breast cancer treated with anastrozole, oncotype 13, ER+ NE+ Her2 neg  7/2023 - metastatic ascending colon adenocarcinoma to liver  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, unresectable  7/31/2023 - FOLFOX  11/20/2023 - opdivo added to FOLFOX  12/18/2023-cycle 11-20% dose reduction of 5-FU bolus and  oxaliplatin due to increased fatigue  Jan 2024 - oxaliplatin removed from treatment plan due to neuropathy - PET scan shows good disease control in all areas except colon lesion  3/2024 - right hemicolectomy - pT3N0  6/3/24 - cryoablation to liver (no interruption to systemic therapy)  7/30/2024 - stop folfox  9/2024 - 10/2024 - cis/RT to tonsils and cervical Lns  12/2024 - disease progression of colon cancer in the liver  01/2025 - Initiated FOLFIRI every 14-days, with 50% dose-reduction of Irinotecan due to pre-existing diarrhea.  February 2025: systemic chemotherapy held due to patient experiencing prolonged side effects of diarrhea and neutropenia.   March 2025: resume C3 of FOLFIRI      12/27/24 PET scan-  1. Interval progression of hypermetabolic malignancy/metastatic disease involving the liver.  2. Interval near complete metabolic response to therapy of previously noted hypermetabolic malignancy/metastatic disease involving the neck.  3. Essentially stable large right adnexal mass with low-level associated FDG activity.  4. 3 mm right lung nodule, new since prior PET/CT. Short interval follow-up with CT of chest in 3 months is recommended to ensure stability and exclude developing pulmonary malignancy/metastatic disease.        12/31/24 Dr. Medina- Clear progression of colon cancer in liver on PET. FOLFIRI with 50% dose reduction of the irinotecan because of pre-existing diarrhea. Cholestyramine TID and Imodium. Continue hydration 3 times a week     1/30/25 Dr. Matthew- Tonsillar fossa is still healing. Evidence of thrush. Will continue Nystatin. F/u 1-2 months     2/4/25 Dr. Medina- due for cycle 3 of FOLFIRI which has been poorly tolerated. Significant diarrhea. Neutropenic. Hold this cycle and f/u in 2 weeks.     2/18/25 Dr. Medina- Give patient another 2 weeks off from chemo. New CT scan during treatment break. Discussed hospice care if chemo adversely affects QOL     2/24/25 CT C/A/P-  1.  Interval  progression of hepatic metastatic disease with increase in size and number of lesions.  2.  Slight increase in size of a right lower lobe pulmonary nodule. There are also a few new tiny pulmonary nodules measuring up to 3 mm. This is suspicious for metastatic disease. Recommend attention on 3-month follow-up.  3.  Right thyroid nodule measuring 1.8 cm. Consider correlation with thyroid ultrasound.     2/28/25 Dr. Medina- Restart chemo with 50% dose reduction of irinotecan and remove 5-FU bolus and leucovorin. CT with disease progression     3/18/25 Dr. Medina- Did well with restart of FOLFIRI. Continue without adjustments     4/1/25 Dr. Medina- Proceed with cycle 4, doing remarkably well. Continue seeing every 2 weeks. CT scan after 6 cycles     4/24/25 Speech therapy  Encouraged to drink after eating , to help wash food down. She will continue oral motor exercises to help with swallowing, she met goal of 25%. She will continue with effortful swallowing, and Mendelsohn exercises.      4/29/25 Speech therapy  She is tolerating 3 meals a day, with tube feedings. She follows a puree and mechanically soft diet. Globus sensation with denser foods. Reports dry mouth and reduced appetite. Continue with speech therapy.      4/29/25 Dr. Medina  Continue with cycle 5 FOLFIRI, patient is tolerating well.Hydration on Monday and Friday. After cycle 6 CT ordered.      4/30/25 palliative care  She will try dronabinol to help with appetite. She is taking tylenol for neck and throat pain.     Patient seen for follow-up today alone.  She reports she has mild fatigue from her chemotherapy.  She has less dysphagia/soreness in the throat.  She reports less dysphagia and is only using Magic mouthwash as needed.  She is also taking less oxycodone with occasionally 1 per day as needed.  She has dryness of the mouth.  She remains on 6 cans of tube feeding per day.  She is also having hydration 3 days a week on Monday Wednesday and  Friday.  She denies any abdominal pains or cramps.  She denies any nausea nor vomiting.  She has a decreased appetite and will be starting on dronabinol once this becomes available in the pharmacy.     Upcoming  5/8/25 Speech/swallowing therapy  5/12/25 Infusion  5/13/25  Medical Oncology         Oncology History   Cancer Staging   Malignant neoplasm of right female breast (HCC)  Staging form: Breast, AJCC 8th Edition  - Pathologic: Stage IA (pT2, pN0, cM0, G2, ER: Positive, CA: Positive, HER2: Negative) - Signed by Rosalva Drake MD on 1/11/2019  Histologic grading system: 3 grade system    Metastatic colon cancer to liver (HCC)  Staging form: Colon and Rectum, AJCC 8th Edition  - Clinical stage from 7/13/2023: cT3, cN0, pM1 - Signed by Harika Medina DO on 9/28/2023  Total positive nodes: 0  - Pathologic: pT3, pN0, cM1 - Signed by Vickie Israel MD on 4/3/2024  Total positive nodes: 0    Right tonsillar squamous cell carcinoma (HCC)  Staging form: Pharynx - Oropharynx, AJCC 8th Edition  - Clinical stage from 7/27/2023: Stage III (cT1, cN3, cM0, p16+) - Signed by Evan Plummer MD on 7/27/2023  Stage prefix: Initial diagnosis  Oncology History   Malignant neoplasm of right female breast (HCC)   11/23/2018 Initial Diagnosis    Malignant neoplasm of right female breast (HCC)     11/23/2018 Biopsy    Rt Breast US BX 1100 8cmfn, 4 passes 12g Marsheliae:  - Invasive breast carcinoma of no special type (ductal NST/invasive ductal carcinoma).  - grade 2  - %  - CA 95%  - HER2 negative     12/20/2018 Surgery    Right partial mastectomy and SLN biopsy:  Infiltrating ductal carcinoma, Grade 2/3, felt to be between 2.0 cm and 2.5 cm in greatest dimension  - No invasive tumor is seen at inked margins, but there is a focus of DCIS seen within approximately 1mm of the inked medial margin  - no LVI  - no LCIS  - 0/2 LN's  at least Stage IIA - pT2, pN0, cM0, G2.    - Dr Coronado     12/20/2018 -  Cancer  "Staged    Stage IIA - pT2, pN0, cM0, G2.       1/4/2019 Genomic Testing    Oncotype DX score: 13     2/1/2019 -  Hormone Therapy    anastrozole 1 mg daily as adjuvant endocrine therapy    - Dr Daley       2/14/2019 - 4/3/2019 Radiation    Course: C1    Plan ID Energy Fractions Dose per Fraction (cGy) Dose Correction (cGy) Total Dose Delivered (cGy) Elapsed Days   R Breast 10X/6X 25 / 25 200 0 5,000 40   R BRST BOOST 16E 5 / 5 200 0 1,000 7      Treatment dates:  C1: 2/14/2019 - 4/3/2019       Metastatic colon cancer to liver (HCC)   7/6/2023 Genetic Testing    CARIS Results:   KRAS Pathogenic Variant Exon 2  p.G12D : lack of benefit of cetuximab, panitumumab Level 2   No other actionable mutation; MSI stable; TMB low   MiFOLOXAi Results: \"Decreased Benefit of FOLFOX + Bevacizumab in first line metastatic CRC.  This patient may achieve improved results by receiving an alternative to FOLFOX, such as FOLFIRI, as their initial regimen. As an adjustment to frontline FOLFOXIRI following toxicity: This patient may achieve improved results by removing the oxaliplatin portion of their regimen\".         7/11/2023 Initial Diagnosis    Metastatic colon cancer to liver (HCC)     7/13/2023 -  Cancer Staged    Staging form: Colon and Rectum, AJCC 8th Edition  - Clinical stage from 7/13/2023: cT3, cN0, pM1 - Signed by Harika Medina DO on 9/28/2023  Total positive nodes: 0       7/31/2023 - 8/1/2024 Chemotherapy    cyanocobalamin, 1,000 mcg, Intramuscular, Every 30 days, 7 of 9 cycles  Administration: 1,000 mcg (5/9/2024), 1,000 mcg (5/24/2024), 1,000 mcg (6/20/2024), 1,000 mcg (7/3/2024), 1,000 mcg (7/18/2024), 1,000 mcg (8/1/2024)  potassium chloride, 20 mEq, Intravenous, Once, 2 of 2 cycles  Administration: 20 mEq (11/6/2023), 20 mEq (11/20/2023)  alteplase (CATHFLO), 2 mg, Intracatheter, Every 1 Minute as needed, 21 of 23 cycles  Administration: 2 mg (1/3/2024)  pegfilgrastim (NEULASTA), 6 mg, Subcutaneous, Once, 12 of 12 " cycles  Administration: 6 mg (10/25/2023), 6 mg (11/8/2023), 6 mg (11/22/2023), 6 mg (12/6/2023), 6 mg (12/20/2023), 6 mg (1/12/2024), 6 mg (1/25/2024), 6 mg (4/25/2024), 6 mg (5/9/2024), 6 mg (5/24/2024), 6 mg (6/20/2024)  fluorouracil (ADRUCIL), 895 mg, Intravenous, Once, 21 of 23 cycles  Dose modification: 320 mg/m2 (original dose 400 mg/m2, Cycle 11)  Administration: 900 mg (7/31/2023), 900 mg (8/14/2023), 900 mg (8/28/2023), 900 mg (9/11/2023), 900 mg (9/25/2023), 900 mg (10/9/2023), 900 mg (10/23/2023), 895 mg (11/6/2023), 895 mg (11/20/2023), 895 mg (12/4/2023), 700 mg (12/18/2023), 680 mg (1/10/2024), 680 mg (1/23/2024), 625 mg (4/23/2024), 625 mg (5/7/2024), 625 mg (5/22/2024), 625 mg (6/4/2024), 625 mg (6/18/2024), 625 mg (7/1/2024), 625 mg (7/16/2024), 625 mg (7/30/2024)  nivolumab (OPDIVO) IVPB, 240 mg (100 % of original dose 240 mg), Intravenous, Once, 13 of 15 cycles  Dose modification: 240 mg (original dose 240 mg, Cycle 9)  Administration: 240 mg (11/20/2023), 240 mg (12/4/2023), 240 mg (12/18/2023), 240 mg (1/10/2024), 240 mg (1/23/2024), 240 mg (4/23/2024), 240 mg (5/7/2024), 240 mg (5/22/2024), 240 mg (6/4/2024), 240 mg (6/18/2024), 240 mg (7/1/2024), 240 mg (7/16/2024), 240 mg (7/30/2024)  leucovorin calcium IVPB, 896 mg, Intravenous, Once, 21 of 23 cycles  Administration: 900 mg (7/31/2023), 900 mg (8/14/2023), 900 mg (8/28/2023), 900 mg (9/11/2023), 900 mg (9/25/2023), 900 mg (10/9/2023), 900 mg (10/23/2023), 900 mg (11/6/2023), 900 mg (11/20/2023), 900 mg (12/4/2023), 900 mg (12/18/2023), 850 mg (1/10/2024), 850 mg (1/23/2024), 800 mg (4/23/2024), 800 mg (5/7/2024), 800 mg (5/22/2024), 800 mg (6/4/2024), 800 mg (6/18/2024), 800 mg (7/1/2024), 800 mg (7/16/2024), 800 mg (7/30/2024)  oxaliplatin (ELOXATIN) chemo infusion, 85 mg/m2 = 190.4 mg, Intravenous, Once, 12 of 12 cycles  Dose modification: 68 mg/m2 (original dose 85 mg/m2, Cycle 11, Reason: Other (Must fill in a comment), Comment:  increased fatigue)  Administration: 190.4 mg (7/31/2023), 190.4 mg (8/14/2023), 200 mg (8/28/2023), 200 mg (9/11/2023), 200 mg (9/25/2023), 200 mg (10/9/2023), 200 mg (10/23/2023), 200 mg (11/6/2023), 200 mg (11/20/2023), 190.4 mg (12/4/2023), 150 mg (12/18/2023), 150 mg (1/10/2024)  fluorouracil (ADRUCIL) ambulatory infusion Soln, 1,200 mg/m2/day = 5,375 mg, Intravenous, Over 46 hours, 21 of 23 cycles     9/26/2023 -  Cancer Staged    Staging form: Colon and Rectum, AJCC 8th Edition  - Pathologic: pT3, pN0, cM1 - Signed by Vickie Israel MD on 4/3/2024  Total positive nodes: 0       3/12/2024 Surgery    A. Terminal ileum, appendix, right colon (right hemicolectomy):  - Adenocarcinoma (5.2cm)  - Forty-nine (49) lymph nodes negative for carcinoma (0/49)    - Acellular mucin present in one (1) lymph node   - See staging synoptic (ypT3N0)     1/8/2025 -  Chemotherapy    fluorouracil (ADRUCIL), 400 mg/m2 = 770 mg, 2 of 2 cycles  Administration: 770 mg (1/8/2025), 770 mg (1/22/2025)  irinotecan (CAMPTOSAR) chemo infusion, 173 mg (50 % of original dose 180 mg/m2), 7 of 11 cycles  Dose modification: 90 mg/m2 (original dose 180 mg/m2, Cycle 1, Reason: Anticipated Tolerance, Comment: 50% dose reduction due to pre-existing diarrhea)  Administration: 180 mg (1/8/2025), 180 mg (1/22/2025), 160 mg (3/5/2025), 160 mg (3/19/2025), 160 mg (4/2/2025), 160 mg (4/16/2025), 160 mg (4/30/2025)  leucovorin calcium IVPB, 768 mg, 2 of 2 cycles  Administration: 800 mg (1/8/2025), 800 mg (1/22/2025)  fluorouracil (ADRUCIL) ambulatory infusion Soln, 1,200 mg/m2/day = 4,610 mg, 7 of 11 cycles     Right tonsillar squamous cell carcinoma (HCC)   7/27/2023 Initial Diagnosis    Right tonsillar squamous cell carcinoma (HCC)     7/27/2023 -  Cancer Staged    Staging form: Pharynx - Oropharynx, AJCC 8th Edition  - Clinical stage from 7/27/2023: Stage III (cT1, cN3, cM0, p16+) - Signed by Evan Plummer MD on 7/27/2023  Stage prefix:  Initial diagnosis       9/16/2024 - 11/13/2024 Radiation      Plan ID Energy Fractions Dose per Fraction (cGy) Dose Correction (cGy) Total Dose Delivered (cGy) Elapsed Days   Head_Neck 6X-FFF 35 / 35 200 0 7,000 58    C2: 9/16/2024 - 11/13/2024 9/17/2024 - 10/21/2024 Chemotherapy    alteplase (CATHFLO), 2 mg, Intracatheter, Every 1 Minute as needed, 6 of 6 cycles  Administration: 2 mg (10/21/2024)  CISplatin (PLATINOL) infusion, 70 mg (original dose 40 mg/m2), Intravenous, Once, 6 of 6 cycles  Dose modification: 70 mg (original dose 40 mg/m2, Cycle 1, Reason: Anticipated Tolerance), 30 mg/m2 (original dose 40 mg/m2, Cycle 4, Reason: Neuropathy, Comment: dose reduction to 30 mg/m2 due to neuropathy)  Administration: 70 mg (9/17/2024), 70 mg (9/23/2024), 70 mg (9/30/2024), 59.1 mg (10/7/2024), 59.1 mg (10/14/2024), 59.1 mg (10/21/2024)  sodium chloride, 500 mL, Intravenous, Once, 6 of 6 cycles  Administration: 500 mL (9/17/2024), 500 mL (9/17/2024), 500 mL (9/23/2024), 500 mL (9/23/2024), 500 mL (9/30/2024), 500 mL (9/30/2024), 500 mL (10/7/2024), 500 mL (10/7/2024), 500 mL (10/14/2024), 500 mL (10/14/2024), 500 mL (10/21/2024)  fosaprepitant (EMEND) IVPB, 150 mg, Intravenous, Once, 6 of 6 cycles  Administration: 150 mg (9/17/2024), 150 mg (9/23/2024), 150 mg (9/30/2024), 150 mg (10/7/2024), 150 mg (10/14/2024), 150 mg (10/21/2024)  IVPB builder, , Intravenous, Once, 1 of 1 cycle  Administration: Unknown dose (10/21/2024)        Review of Systems Refer to nursing note.    Current Outpatient Medications on File Prior to Visit   Medication Sig Dispense Refill   • acetaminophen (TYLENOL) 160 mg/5 mL liquid Take 20 mL (640 mg total) by mouth every 6 (six) hours as needed for mild pain for up to 10 days 473 mL 3   • anastrozole (ARIMIDEX) 1 mg tablet Take 1 tablet (1 mg total) by mouth daily 90 tablet 0   • atorvastatin (LIPITOR) 40 mg tablet Take 1 tablet (40 mg total) by mouth daily at bedtime 30 tablet 2   •  cholestyramine (QUESTRAN) 4 g packet Take 1 packet (4 g total) by mouth 3 (three) times a day with meals 90 packet 3   • docusate sodium (COLACE) 50 mg capsule Take 1 capsule (50 mg total) by mouth 2 (two) times a day 90 capsule 1   • dronabinol (MARINOL) 2.5 mg capsule Take 1 capsule (2.5 mg total) by mouth in the morning 5 capsule 0   • ergocalciferol (VITAMIN D2) 50,000 units Take 1 capsule (50,000 Units total) by mouth once a week 90 capsule 0   • [START ON 5/14/2025] fluorouracil 4,390 mg in CADD/Elastomeric Infusion Device Infuse 4,390 mg (1,200 mg/m2/day x 1.83 m2) into a catheter in a vein via infusion device over 46 hours for 2 days  Infusion planned for May 14, 2025. 1 each 0   • folic acid (FOLVITE) 1 mg tablet Take 1 tablet (1 mg total) by mouth in the morning 90 tablet 3   • gabapentin (NEURONTIN) 300 mg capsule Take 1 pills in the morning, 1 pill at lunch and 2 pills before bed 120 capsule 0   • losartan (COZAAR) 50 mg tablet Take 1 tablet (50 mg total) by mouth daily 90 tablet 2   • mirtazapine (REMERON) 15 mg tablet Take 1 tablet (15 mg total) by mouth daily at bedtime Patient is also on a 30 mg tablet, for a total dose of 45 mg 30 tablet 0   • mirtazapine (REMERON) 30 mg tablet Take 1 tablet (30 mg total) by mouth daily at bedtime 30 tablet 0   • nystatin (MYCOSTATIN) 500,000 units/5 mL suspension Apply 5 mL (500,000 Units total) to the mouth or throat 4 (four) times a day 600 mL 3   • omeprazole (PriLOSEC) 20 mg delayed release capsule Take 1 capsule (20 mg total) by mouth daily 30 capsule 3   • ondansetron (ZOFRAN) 8 mg tablet Take 1 tablet (8 mg total) by mouth every 8 (eight) hours as needed for nausea or vomiting 90 tablet 2   • oxyCODONE (ROXICODONE) 5 mg/5 mL solution Take 5 mL (5 mg total) by mouth every 6 (six) hours as needed for severe pain ((breakthrough pain)) Max Daily Amount: 20 mg 473 mL 0   • potassium chloride 10% oral solution 15 mL (20 mEq total) by Per G Tube route 2 (two) times  a day 473 mL 0   • prochlorperazine (COMPAZINE) 10 mg tablet Take 1 tablet (10 mg total) by mouth every 6 (six) hours as needed for nausea or vomiting 90 tablet 2   • pyridoxine (VITAMIN B6) 50 mg tablet Take 1 tablet (50 mg total) by mouth daily 90 tablet 3   • vitamin B-12 (VITAMIN B-12) 1,000 mcg tablet      • diphenhydramine, lidocaine, Al/Mg hydroxide, simethicone (Magic Mouthwash) SUSP Swish and swallow 10 mL every 4 (four) hours as needed for mouth pain or discomfort (Patient not taking: Reported on 1/31/2025) 480 mL 2   • hydrocortisone 1 % ointment  (Patient not taking: Reported on 12/20/2024)     • ibuprofen (MOTRIN) 100 mg/5 mL suspension Take 20 mL (400 mg total) by mouth every 6 (six) hours as needed for moderate pain (Patient not taking: Reported on 1/31/2025) 473 mL 0   • lidocaine-prilocaine (EMLA) cream Apply topically as needed for mild pain (Patient not taking: Reported on 12/6/2024) 30 g 3   • magnesium Oxide (MAG-OX) 400 mg TABS 1 tablet (400 mg total) by Per G Tube route 2 (two) times a day (Patient not taking: Reported on 1/31/2025) 60 tablet 0   • potassium chloride (Klor-Con M20) 20 mEq tablet Take 1 tablet (20 mEq total) by mouth 2 (two) times a day (Patient not taking: Reported on 1/31/2025) 90 tablet 1     Current Facility-Administered Medications on File Prior to Visit   Medication Dose Route Frequency Provider Last Rate Last Admin   • alteplase (CATHFLO) injection 2 mg  2 mg Intracatheter Q1MIN PRN Harika Agostino, DO       • [COMPLETED] sodium chloride 0.9 % bolus 1,000 mL  1,000 mL Intravenous Once Harika Agostino, DO   Stopped at 05/07/25 1415   • [DISCONTINUED] alteplase (CATHFLO) injection 2 mg  2 mg Intracatheter Q1MIN PRN Harika Agostino, DO          Social History     Tobacco Use   • Smoking status: Never     Passive exposure: Never   • Smokeless tobacco: Never   • Tobacco comments:     NO TOBACCO USE   Vaping Use   • Vaping status: Never Used   Substance and Sexual Activity   •  "Alcohol use: Never   • Drug use: Never   • Sexual activity: Not on file         Objective   /82 (BP Location: Right arm, Patient Position: Sitting, Cuff Size: Standard)   Pulse 80   Temp (!) 97 °F (36.1 °C) (Temporal)   Resp 16   Ht 5' 5\" (1.651 m)   Wt 73 kg (161 lb)   BMI 26.79 kg/m²     Pain Screening:  Pain Score: 0-No pain  ECOG ECOG Performance Status: 1 - Restricted in physically strenuous activity but ambulatory and able to carry out work of a light or sedentary nature, e.g., light house work, office work  Physical Exam   Physical Exam  Constitutional:       General: She is not in acute distress.     Appearance: Normal appearance. She is well-developed. She is not diaphoretic.   HENT:      Head: Normocephalic and atraumatic.      Mouth/Throat: Oral cavity reveals no evidence of any masses nor evidence of any thrush.     Pharynx: No oropharyngeal exudate and no masses/lesions in the tonsillar regions.   Eyes:      General: No scleral icterus.     Conjunctiva/sclera: Conjunctivae normal.      Pupils: Pupils are equal, round, and reactive to light.   Neck:      Thyroid: No thyromegaly.      Trachea: No tracheal deviation.   Cardiovascular:      Rate and Rhythm: Normal rate and regular rhythm.      Heart sounds: Normal heart sounds.   Pulmonary:      Effort: Pulmonary effort is normal. No respiratory distress.      Breath sounds: Normal breath sounds. No stridor. No wheezing, rhonchi or rales.   Chest:      Chest wall: No tenderness.   Abdominal:      General: Bowel sounds are normal. There is no distension.      Palpations: Abdomen is soft. There is no mass.      Tenderness: There is no abdominal tenderness.   Musculoskeletal:         General: No tenderness. Normal range of motion.      Cervical back: Normal range of motion and neck supple.   Lymphadenopathy:      Cervical: No cervical adenopathy.      Upper Body:      Right upper body: No supraclavicular adenopathy.      Left upper body: No " "supraclavicular adenopathy.   Skin:     General: Skin is warm and dry.  She has some hyperpigmentation and postradiation changes of the neck skin and dryness bilaterally.     Coloration: Skin is not jaundiced or pale.      Findings: No erythema or rash.   Neurological:      General: No focal deficit present.      Mental Status: She is alert and oriented to person, place, and time.      Cranial Nerves: No cranial nerve deficit.      Coordination: Coordination normal.   Psychiatric:         Mood and Affect: Mood normal.         Behavior: Behavior normal.         Thought Content: Thought content normal.         Judgment: Judgment normal.     Results  See Above    Administrative Statements   I have spent a total time of 20 minutes in caring for this patient on the day of the visit/encounter including Diagnostic results, Prognosis, Risks and benefits of tx options, Instructions for management, Patient and family education, Importance of tx compliance, Risk factor reductions, Impressions, Counseling / Coordination of care, Documenting in the medical record, Reviewing/placing orders in the medical record (including tests, medications, and/or procedures), Obtaining or reviewing history  , and Communicating with other healthcare professionals .  Portions of the record may have been created with voice recognition software.  Occasional wrong word or \"sound a like\" substitutions may have occurred due to the inherent limitations of voice recognition software.  Read the chart carefully and recognize, using context, where substitutions have occurred.  "

## 2025-05-08 ENCOUNTER — OFFICE VISIT (OUTPATIENT)
Dept: SPEECH THERAPY | Facility: CLINIC | Age: 70
End: 2025-05-08
Attending: INTERNAL MEDICINE
Payer: MEDICARE

## 2025-05-08 DIAGNOSIS — R13.12 DYSPHAGIA, OROPHARYNGEAL PHASE: Primary | ICD-10-CM

## 2025-05-08 DIAGNOSIS — Z90.89 STATUS POST TONSILLECTOMY: ICD-10-CM

## 2025-05-08 DIAGNOSIS — C09.9 RIGHT TONSILLAR SQUAMOUS CELL CARCINOMA (HCC): ICD-10-CM

## 2025-05-08 PROCEDURE — 92526 ORAL FUNCTION THERAPY: CPT | Performed by: SPEECH-LANGUAGE PATHOLOGIST

## 2025-05-08 NOTE — PROGRESS NOTES
Daily Speech Treatment Note    Today's date: 2025  Patient’s name: Maira Moura  : 1955  MRN: [id number]  Safety measures: HTN, CVA, GERD, active CA, s/p PEG tube placement, aspiration precautions  Current recommended diet: PEG tube for primary nutrition/hydration; mechanical soft with thin liquids P.O. as tolerated   Referring provider: Harika Medina DO Encounter Diagnosis     ICD-10-CM    1. Dysphagia, oropharyngeal phase  R13.12       2. 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 Status post tonsillectomy  Z90.89         Visit Tracking:  POC   Expires Auth Expiration Date ST Visit Limit   2025 or 10th visit 2025 [facility]              Visit/Unit Tracking:  Auth Status Date 25         Not required Used 1 2 3           Remaining 9 8 7            Subjective/Behavioral:  -Patient reported that she did not eat breakfast this morning--consumed more P.O. during therapy session.    Objective/Assessment:    Short-term goals:  Patient will receive a videofluoroscopic swallow study (VFSS) to assess her current swallowing function to r/o penetration or aspiration, to determine the safest, least restrictive diet, and to recommended the appropriate rehabilitation exercises (to be achieved in 2-3 weeks).  Repeat VBSS is scheduled 25 ([place])       Patient will tolerate P.O. trials of her recommended diet with the implementation of safe swallowing strategies without overt s/sx of penetration and/or aspiration during skilled therapy sessions in this certification period (to be achieved in 4-6 weeks).    Traditional VitalStim     Channel(s) used: 1, 2   Placement: 3a   Duty Cycle: 57/1 s   Frequency: 80 pulses per second   Phase Duration: 300 microseconds   Treatment Duration: 38 minutes   Min mA level: 5.0 mA   Max mA level: 9.0 mA      Patient tolerated NMES in conjunction with pharyngeal/laryngeal strengthening exercises and P.O. intake. (The  "patient received instruction on the potential benefits from NMES treatment, including neuromuscular re-education and muscle strengthening.) Skin condition post-NMES: intact.      Patient consumed the following during today’s session: macaroni and cheese & chicken jeannine with sauce and water (thin liquid) via side of cup -- NMES remained on during pharyngeal strengthening exercises (see below).     Oral containment, mastication, bolus formation/control, AP transfer, and swallow initiation were judged to be WFL. Patient with c/o mac and cheese \"getting stuck near the back of [her] denture in [her] throat\". Clinician perceived a change in patient's vocal quality, which improved when patient implemented 2-3 additional swallows. Patient reported moderate globus sensation on the mac and cheese. Reduced hyolaryngeal elevation and excursion noted upon palpation. No oral cavity residue was observed. Patient demonstrated throat clear x5. No other overt s/sx of penetration/aspiration noted during today's session.     Patient will independently complete pharyngeal strengthening exercises (e.g., Effortful swallow, Mendelsohn, Evelia) daily to facilitate increased pharyngeal strength and coordination (to be achieved in 4-6 weeks).  -Patient completed the following pharyngeal strengthening exercises during today's session:  *Effortful swallow: 1 set of 10 reps  *Mendelsohn: 1 set of 10 reps  *Evelia: 2 sets of 10 reps    Plan:  -Patient was provided with home exercises/activities to target goals in plan of care at the end of today's session.  -Continue with current plan of care.  "

## 2025-05-12 ENCOUNTER — HOSPITAL ENCOUNTER (OUTPATIENT)
Dept: INFUSION CENTER | Facility: CLINIC | Age: 70
Discharge: HOME | End: 2025-05-12
Attending: INTERNAL MEDICINE
Payer: MEDICARE

## 2025-05-12 VITALS
SYSTOLIC BLOOD PRESSURE: 133 MMHG | RESPIRATION RATE: 18 BRPM | TEMPERATURE: 98 F | DIASTOLIC BLOOD PRESSURE: 88 MMHG | HEART RATE: 103 BPM

## 2025-05-12 DIAGNOSIS — E86.0 DEHYDRATION: Primary | ICD-10-CM

## 2025-05-12 LAB
ALBUMIN SERPL BCG-MCNC: 3.7 G/DL (ref 3.5–5)
ALP SERPL-CCNC: 134 U/L (ref 34–104)
ALT SERPL W P-5'-P-CCNC: 9 U/L (ref 7–52)
ANION GAP SERPL CALCULATED.3IONS-SCNC: 7 MMOL/L (ref 4–13)
AST SERPL W P-5'-P-CCNC: 22 U/L (ref 13–39)
BASOPHILS # BLD AUTO: 0.06 THOUSANDS/ÂΜL (ref 0–0.1)
BASOPHILS NFR BLD AUTO: 1 % (ref 0–1)
BILIRUB SERPL-MCNC: 0.37 MG/DL (ref 0.2–1)
BUN SERPL-MCNC: 29 MG/DL (ref 5–25)
CALCIUM SERPL-MCNC: 9.9 MG/DL (ref 8.4–10.2)
CHLORIDE SERPL-SCNC: 101 MMOL/L (ref 96–108)
CO2 SERPL-SCNC: 30 MMOL/L (ref 21–32)
CREAT SERPL-MCNC: 1.12 MG/DL (ref 0.6–1.3)
EOSINOPHIL # BLD AUTO: 0.26 THOUSAND/ÂΜL (ref 0–0.61)
EOSINOPHIL NFR BLD AUTO: 3 % (ref 0–6)
ERYTHROCYTE [DISTWIDTH] IN BLOOD BY AUTOMATED COUNT: 15.6 % (ref 11.6–15.1)
GFR SERPL CREATININE-BSD FRML MDRD: 50 ML/MIN/1.73SQ M
GLUCOSE SERPL-MCNC: 106 MG/DL (ref 65–140)
HCT VFR BLD AUTO: 31.2 % (ref 34.8–46.1)
HGB BLD-MCNC: 10.2 G/DL (ref 11.5–15.4)
IMM GRANULOCYTES # BLD AUTO: 0.05 THOUSAND/UL (ref 0–0.2)
IMM GRANULOCYTES NFR BLD AUTO: 1 % (ref 0–2)
LYMPHOCYTES # BLD AUTO: 1.06 THOUSANDS/ÂΜL (ref 0.6–4.47)
LYMPHOCYTES NFR BLD AUTO: 10 % (ref 14–44)
MAGNESIUM SERPL-MCNC: 2 MG/DL (ref 1.9–2.7)
MCH RBC QN AUTO: 31.8 PG (ref 26.8–34.3)
MCHC RBC AUTO-ENTMCNC: 32.7 G/DL (ref 31.4–37.4)
MCV RBC AUTO: 97 FL (ref 82–98)
MONOCYTES # BLD AUTO: 1.13 THOUSAND/ÂΜL (ref 0.17–1.22)
MONOCYTES NFR BLD AUTO: 11 % (ref 4–12)
NEUTROPHILS # BLD AUTO: 7.93 THOUSANDS/ÂΜL (ref 1.85–7.62)
NEUTS SEG NFR BLD AUTO: 74 % (ref 43–75)
NRBC BLD AUTO-RTO: 0 /100 WBCS
PLATELET # BLD AUTO: 309 THOUSANDS/UL (ref 149–390)
PMV BLD AUTO: 10.6 FL (ref 8.9–12.7)
POTASSIUM SERPL-SCNC: 3.8 MMOL/L (ref 3.5–5.3)
PROT SERPL-MCNC: 7.8 G/DL (ref 6.4–8.4)
RBC # BLD AUTO: 3.21 MILLION/UL (ref 3.81–5.12)
SODIUM SERPL-SCNC: 138 MMOL/L (ref 135–147)
WBC # BLD AUTO: 10.49 THOUSAND/UL (ref 4.31–10.16)

## 2025-05-12 PROCEDURE — 80053 COMPREHEN METABOLIC PANEL: CPT | Performed by: INTERNAL MEDICINE

## 2025-05-12 PROCEDURE — 85025 COMPLETE CBC W/AUTO DIFF WBC: CPT | Performed by: INTERNAL MEDICINE

## 2025-05-12 PROCEDURE — 83735 ASSAY OF MAGNESIUM: CPT | Performed by: INTERNAL MEDICINE

## 2025-05-12 PROCEDURE — 96360 HYDRATION IV INFUSION INIT: CPT

## 2025-05-12 RX ADMIN — SODIUM CHLORIDE 1000 ML: 9 INJECTION, SOLUTION INTRAVENOUS at 13:09

## 2025-05-12 NOTE — PROGRESS NOTES
Labs collected via port. Pt tolerated IV hydration without incident. Pt provided with AVS, aware of next appt 5/14 at 12pm.

## 2025-05-13 ENCOUNTER — TELEPHONE (OUTPATIENT)
Dept: HEMATOLOGY ONCOLOGY | Facility: CLINIC | Age: 70
End: 2025-05-13

## 2025-05-13 ENCOUNTER — TELEMEDICINE (OUTPATIENT)
Dept: HEMATOLOGY ONCOLOGY | Facility: CLINIC | Age: 70
End: 2025-05-13
Payer: MEDICARE

## 2025-05-13 ENCOUNTER — OFFICE VISIT (OUTPATIENT)
Dept: SPEECH THERAPY | Facility: CLINIC | Age: 70
End: 2025-05-13
Attending: INTERNAL MEDICINE
Payer: MEDICARE

## 2025-05-13 DIAGNOSIS — Z90.89 STATUS POST TONSILLECTOMY: ICD-10-CM

## 2025-05-13 DIAGNOSIS — C09.9 RIGHT TONSILLAR SQUAMOUS CELL CARCINOMA (HCC): ICD-10-CM

## 2025-05-13 DIAGNOSIS — C50.411 MALIGNANT NEOPLASM OF UPPER-OUTER QUADRANT OF RIGHT BREAST IN FEMALE, ESTROGEN RECEPTOR POSITIVE (HCC): ICD-10-CM

## 2025-05-13 DIAGNOSIS — C18.9 METASTATIC COLON CANCER TO LIVER (HCC): Primary | ICD-10-CM

## 2025-05-13 DIAGNOSIS — R13.12 DYSPHAGIA, OROPHARYNGEAL PHASE: Primary | ICD-10-CM

## 2025-05-13 DIAGNOSIS — C78.7 METASTATIC COLON CANCER TO LIVER (HCC): Primary | ICD-10-CM

## 2025-05-13 DIAGNOSIS — Z17.0 MALIGNANT NEOPLASM OF UPPER-OUTER QUADRANT OF RIGHT BREAST IN FEMALE, ESTROGEN RECEPTOR POSITIVE (HCC): ICD-10-CM

## 2025-05-13 PROCEDURE — 92526 ORAL FUNCTION THERAPY: CPT

## 2025-05-13 PROCEDURE — 99213 OFFICE O/P EST LOW 20 MIN: CPT

## 2025-05-13 NOTE — ASSESSMENT & PLAN NOTE
Orders:    CT chest abdomen pelvis w contrast; Future    Patient will continue anastrozole daily.

## 2025-05-13 NOTE — TELEPHONE ENCOUNTER
Spoke with patient to make her aware of her appointments for CT scan and with Dr. Medina. I informed her that I set up [facility] transport for her. Patient verbalized understanding and is in agreement with the plan.

## 2025-05-13 NOTE — PROGRESS NOTES
Virtual Brief Visit  Name: Maira Moura      : 1955      MRN: [id number]  Encounter Provider: JOANN Martin  Encounter Date: 2025   Encounter department: [facility] [place]  :  Assessment & Plan  Metastatic colon cancer to liver (HCC)    Orders:    CT chest abdomen pelvis w contrast; Future    70-year-old with metastatic colon cancer to liver, locally advanced head/neck cancer and early stage breast cancer.    We will proceed with cycle 6 of FOLFIRI at current dose.  We will continue with hydration twice a week ( and ).  We will see her back in the office prior to cycle 7 with Dr. Medina and CT chest abdomen pelvis w contrast prior for continued monitoring of her metastatic colon cancer.    Malignant neoplasm of upper-outer quadrant of right breast in female, estrogen receptor positive (HCC)    Orders:    CT chest abdomen pelvis w contrast; Future    Patient will continue anastrozole daily.      History of Present Illness     Patient presents for a follow up of her metastatic colon cancer to liver, locally advanced head/neck cancer and early stage hormone positive Her2- right sided breast cancer prior to cycle 6 of FOLFIRI.  Patient is undergoing Speech Therapy, which she reports is going well.      Patient is able to tolerate more solid foods, for example, she had lasagna yesterday and was able to swallow better.  Patient continues about 3 meals a day, foods such as cereal/oatmeal for breakfast and lunch, soft meats or eggs for dinner.  She continues with 2-tube feeds a day.  In addition, patient drinks 2 ensures daily.    Patient tolerated cycle 4 of FOLFIRI fairly well. She endorses mild fatigue, which is manageable.  She continues to require naps in the afternoons.  She has had diarrhea for the past two days, about 2 bouts a day. She did take cholestyramine yesterday and has not had diarrhea today.  Her neuropathy is stable, however, she  did have a fall last week, fell on her bed.  She reports she has had intermittent hot flashes.   Patient denies any fevers, infections, nausea, muscle/joint aches.     Labs reviewed, ok for treatment:  5/12/2025: Hgb 10.2, MCV 97, WBC 10.4, ANC 7.93, platelets 309,000, creatinine 1.12, eGFR 50, ALK PHOS 134, AST 22, ALT 9      Administrative Statements   Encounter provider JOANN Martin    The Patient is located at Home and in the following state in which I hold an active license [place].    The patient was identified by name and date of birth. Maira Moura was informed that this is a telemedicine visit and that the visit is being conducted through the [employer] Teams platform. She agrees to proceed..  My office door was closed. No one else was in the room.  She acknowledged consent and understanding of privacy and security of the video platform. The patient has agreed to participate and understands they can discontinue the visit at any time.    I have spent a total time of 20 minutes in caring for this patient on the day of the visit/encounter including chart review, counseling, coordination of care, and documentation;  not including the time spent for establishing the audio/video connection.

## 2025-05-13 NOTE — PROGRESS NOTES
Daily Speech Treatment Note    Today's date: 2025  Patient’s name: Maira Moura  : 1955  MRN: [id number]  Safety measures: HTN, CVA, GERD, active CA, s/p PEG tube placement, aspiration precautions  Current recommended diet: PEG tube for primary nutrition/hydration; mechanical soft with thin liquids P.O. as tolerated   Referring provider: Harika Medina DO    Encounter Diagnosis     ICD-10-CM    1. Dysphagia, oropharyngeal phase  R13.12       2. Status post tonsillectomy  Z90.89       3. 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         Visit Tracking:  POC   Expires Auth Expiration Date ST Visit Limit   2025 or 10th visit 2025 [facility]              Visit/Unit Tracking:  Auth Status Date 25        Not required Used 1 2 3 4          Remaining 9 8 7 6           Subjective/Behavioral:    Patient reports she had some lasagna with sausage -burned her throat    Objective/Assessment:    Short-term goals:  Patient will receive a videofluoroscopic swallow study (VFSS) to assess her current swallowing function to r/o penetration or aspiration, to determine the safest, least restrictive diet, and to recommended the appropriate rehabilitation exercises (to be achieved in 2-3 weeks).  Repeat VBSS is scheduled 25 ([place])       Patient will tolerate P.O. trials of her recommended diet with the implementation of safe swallowing strategies without overt s/sx of penetration and/or aspiration during skilled therapy sessions in this certification period (to be achieved in 4-6 weeks).    Traditional VitalStim     Channel(s) used: 1, 2   Placement: 3a   Duty Cycle: 57/1 s   Frequency: 80 pulses per second   Phase Duration: 300 microseconds   Treatment Duration: 38 minutes   Min mA level: 6.5 mA   Max mA level: 8.0 mA      Patient tolerated NMES in conjunction with pharyngeal/laryngeal strengthening exercises and P.O. intake. (The patient received instruction  "on the potential benefits from NMES treatment, including neuromuscular re-education and muscle strengthening.) Skin condition post-NMES: intact.      Patient consumed the following during today’s session: mandarin oranges in juice and a nutrigrain (soft fruit) bar and water (thin liquid) via side of cup -- NMES remained on during pharyngeal strengthening exercises (see below).     Oral containment, mastication, bolus formation/control, AP transfer, and swallow initiation were judged to be WFL. Patient with c/o mac and cheese \"getting stuck near the back of [her] denture in [her] throat\". Clinician perceived a change in patient's vocal quality, which improved when patient implemented 2-3 additional swallows. Patient reported moderate globus sensation on the mac and cheese. Reduced hyolaryngeal elevation and excursion noted upon palpation. No oral cavity residue was observed. Patient demonstrated throat clear x5. No other overt s/sx of penetration/aspiration noted during today's session.     Patient will independently complete pharyngeal strengthening exercises (e.g., Effortful swallow, Mendelsohn, Evelia) daily to facilitate increased pharyngeal strength and coordination (to be achieved in 4-6 weeks).    Plan:  -Patient was provided with home exercises/activities to target goals in plan of care at the end of today's session.  -Continue with current plan of care.  "

## 2025-05-13 NOTE — ASSESSMENT & PLAN NOTE
Orders:    CT chest abdomen pelvis w contrast; Future    70-year-old with metastatic colon cancer to liver, locally advanced head/neck cancer and early stage breast cancer.    We will proceed with cycle 6 of FOLFIRI at current dose.  We will continue with hydration twice a week (Mondays and Wednesdays).  We will see her back in the office prior to cycle 7 with Dr. Medina and CT chest abdomen pelvis w contrast prior for continued monitoring of her metastatic colon cancer.

## 2025-05-13 NOTE — TELEPHONE ENCOUNTER
Please schedule hydration appointments and chemo at [facility]. Please schedule [facility] transport. Thanks!

## 2025-05-14 ENCOUNTER — NUTRITION (OUTPATIENT)
Dept: NUTRITION | Facility: CLINIC | Age: 70
End: 2025-05-14

## 2025-05-14 ENCOUNTER — HOSPITAL ENCOUNTER (OUTPATIENT)
Dept: INFUSION CENTER | Facility: CLINIC | Age: 70
Discharge: HOME | End: 2025-05-14
Attending: INTERNAL MEDICINE
Payer: MEDICARE

## 2025-05-14 VITALS
RESPIRATION RATE: 16 BRPM | DIASTOLIC BLOOD PRESSURE: 80 MMHG | WEIGHT: 160.05 LBS | TEMPERATURE: 97.3 F | HEART RATE: 108 BPM | SYSTOLIC BLOOD PRESSURE: 123 MMHG | HEIGHT: 65 IN | BODY MASS INDEX: 26.67 KG/M2

## 2025-05-14 DIAGNOSIS — Z71.3 NUTRITIONAL COUNSELING: Primary | ICD-10-CM

## 2025-05-14 DIAGNOSIS — E86.0 DEHYDRATION: ICD-10-CM

## 2025-05-14 DIAGNOSIS — C18.9 METASTATIC COLON CANCER TO LIVER (HCC): Primary | ICD-10-CM

## 2025-05-14 DIAGNOSIS — C78.7 METASTATIC COLON CANCER TO LIVER (HCC): Primary | ICD-10-CM

## 2025-05-14 PROCEDURE — 96413 CHEMO IV INFUSION 1 HR: CPT

## 2025-05-14 PROCEDURE — 96367 TX/PROPH/DG ADDL SEQ IV INF: CPT

## 2025-05-14 PROCEDURE — 96375 TX/PRO/DX INJ NEW DRUG ADDON: CPT

## 2025-05-14 PROCEDURE — G0498 CHEMO EXTEND IV INFUS W/PUMP: HCPCS

## 2025-05-14 RX ORDER — SODIUM CHLORIDE 9 MG/ML
20 INJECTION, SOLUTION INTRAVENOUS ONCE
Status: COMPLETED | OUTPATIENT
Start: 2025-05-14 | End: 2025-05-14

## 2025-05-14 RX ORDER — ATROPINE SULFATE 1 MG/ML
0.25 INJECTION, SOLUTION INTRAVENOUS ONCE
Status: COMPLETED | OUTPATIENT
Start: 2025-05-14 | End: 2025-05-14

## 2025-05-14 RX ORDER — ATROPINE SULFATE 1 MG/ML
0.25 INJECTION, SOLUTION INTRAVENOUS ONCE AS NEEDED
Status: DISCONTINUED | OUTPATIENT
Start: 2025-05-14 | End: 2025-05-17 | Stop reason: HOSPADM

## 2025-05-14 RX ADMIN — SODIUM CHLORIDE 1000 ML: 0.9 INJECTION, SOLUTION INTRAVENOUS at 12:34

## 2025-05-14 RX ADMIN — DEXAMETHASONE SODIUM PHOSPHATE: 10 INJECTION, SOLUTION INTRAMUSCULAR; INTRAVENOUS at 12:35

## 2025-05-14 RX ADMIN — ATROPINE SULFATE 0.25 MG: 1 INJECTION, SOLUTION INTRAVENOUS at 13:12

## 2025-05-14 RX ADMIN — SODIUM CHLORIDE 20 ML/HR: 0.9 INJECTION, SOLUTION INTRAVENOUS at 12:34

## 2025-05-14 RX ADMIN — IRINOTECAN HYDROCHLORIDE 160 MG: 20 INJECTION, SOLUTION INTRAVENOUS at 13:16

## 2025-05-14 NOTE — PLAN OF CARE
Problem: Potential for Falls  Goal: Patient will remain free of falls  Description: INTERVENTIONS:  - Educate patient/family on patient safety including physical limitations  - Instruct patient to call for assistance with activity   - Consider consulting OT/PT to assist with strengthening/mobility based on AM PAC & -HLM score  - Consult OT/PT to assist with strengthening/mobility   - Keep Call bell within reach  - Keep bed low and locked with side rails adjusted as appropriate  - Keep care items and personal belongings within reach  - Initiate and maintain comfort rounds  - Make Fall Risk Sign visible to staff

## 2025-05-14 NOTE — PROGRESS NOTES
Outpatient Oncology Nutrition Consultation   Type of Consult: Follow Up   Care Location: Telephone Call    Reason for referral: Received notification by [facility] on 8/21/24 that pt has triggered for oncology nutrition care (reason for referral: HNC dx and Malnutrition Screening Tool (MST) Triggers: scored a 0 indicating No recent wt loss and is not eating poorly due to a decreased appetite. 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   -7/31/2023 started on FOLFOX   -nivolumab added to cycle 9   -finished July 2024  Started chemo/RT  9/16/24 for HNC   -Cisplatin   -RT EOT 11/13/24  S/p PEG placement 10/2/24  S/p nasopharyngoscopy, left tonsillectomy 10/9/24  Colon cancer progression; started on FOLFIRI with a 50% dose reduction of the irinotecan 1/8/25  Oncology History   Malignant neoplasm of right female breast (HCC)   11/23/2018 Initial Diagnosis    Malignant neoplasm of right female breast (HCC)     11/23/2018 Biopsy    Rt Breast US BX 1100 8cmfn, 4 passes 12g Marquee:  - Invasive breast carcinoma of no special type (ductal NST/invasive ductal carcinoma).  - grade 2  - %  - WA 95%  - HER2 negative     12/20/2018 Surgery    Right partial mastectomy and SLN biopsy:  Infiltrating ductal carcinoma, Grade 2/3, felt to be between 2.0 cm and 2.5 cm in greatest dimension  - No invasive tumor is seen at inked margins, but there is a focus of DCIS seen within approximately 1mm of the inked medial margin  - no LVI  - no LCIS  - 0/2 LN's  at least Stage IIA - pT2, pN0, cM0, G2.    - Dr Coronado     12/20/2018 -  Cancer Staged    Stage IIA - pT2, pN0, cM0, G2.       1/4/2019 Genomic Testing    Oncotype DX score: 13     2/1/2019  "-  Hormone Therapy    anastrozole 1 mg daily as adjuvant endocrine therapy    - Dr Daley       2/14/2019 - 4/3/2019 Radiation    Course: C1    Plan ID Energy Fractions Dose per Fraction (cGy) Dose Correction (cGy) Total Dose Delivered (cGy) Elapsed Days   R Breast 10X/6X 25 / 25 200 0 5,000 40   R BRST BOOST 16E 5 / 5 200 0 1,000 7      Treatment dates:  C1: 2/14/2019 - 4/3/2019       Metastatic colon cancer to liver (HCC)   7/6/2023 Genetic Testing    CARIS Results:   KRAS Pathogenic Variant Exon 2  p.G12D : lack of benefit of cetuximab, panitumumab Level 2   No other actionable mutation; MSI stable; TMB low   MiFOLOXAi Results: \"Decreased Benefit of FOLFOX + Bevacizumab in first line metastatic CRC.  This patient may achieve improved results by receiving an alternative to FOLFOX, such as FOLFIRI, as their initial regimen. As an adjustment to frontline FOLFOXIRI following toxicity: This patient may achieve improved results by removing the oxaliplatin portion of their regimen\".         7/11/2023 Initial Diagnosis    Metastatic colon cancer to liver (HCC)     7/13/2023 -  Cancer Staged    Staging form: Colon and Rectum, AJCC 8th Edition  - Clinical stage from 7/13/2023: cT3, cN0, pM1 - Signed by Harika Medina DO on 9/28/2023  Total positive nodes: 0       7/31/2023 - 8/1/2024 Chemotherapy    cyanocobalamin, 1,000 mcg, Intramuscular, Every 30 days, 7 of 9 cycles  Administration: 1,000 mcg (5/9/2024), 1,000 mcg (5/24/2024), 1,000 mcg (6/20/2024), 1,000 mcg (7/3/2024), 1,000 mcg (7/18/2024), 1,000 mcg (8/1/2024)  potassium chloride, 20 mEq, Intravenous, Once, 2 of 2 cycles  Administration: 20 mEq (11/6/2023), 20 mEq (11/20/2023)  alteplase (CATHFLO), 2 mg, Intracatheter, Every 1 Minute as needed, 21 of 23 cycles  Administration: 2 mg (1/3/2024)  pegfilgrastim (NEULASTA), 6 mg, Subcutaneous, Once, 12 of 12 cycles  Administration: 6 mg (10/25/2023), 6 mg (11/8/2023), 6 mg (11/22/2023), 6 mg (12/6/2023), 6 mg " (12/20/2023), 6 mg (1/12/2024), 6 mg (1/25/2024), 6 mg (4/25/2024), 6 mg (5/9/2024), 6 mg (5/24/2024), 6 mg (6/20/2024)  fluorouracil (ADRUCIL), 895 mg, Intravenous, Once, 21 of 23 cycles  Dose modification: 320 mg/m2 (original dose 400 mg/m2, Cycle 11)  Administration: 900 mg (7/31/2023), 900 mg (8/14/2023), 900 mg (8/28/2023), 900 mg (9/11/2023), 900 mg (9/25/2023), 900 mg (10/9/2023), 900 mg (10/23/2023), 895 mg (11/6/2023), 895 mg (11/20/2023), 895 mg (12/4/2023), 700 mg (12/18/2023), 680 mg (1/10/2024), 680 mg (1/23/2024), 625 mg (4/23/2024), 625 mg (5/7/2024), 625 mg (5/22/2024), 625 mg (6/4/2024), 625 mg (6/18/2024), 625 mg (7/1/2024), 625 mg (7/16/2024), 625 mg (7/30/2024)  nivolumab (OPDIVO) IVPB, 240 mg (100 % of original dose 240 mg), Intravenous, Once, 13 of 15 cycles  Dose modification: 240 mg (original dose 240 mg, Cycle 9)  Administration: 240 mg (11/20/2023), 240 mg (12/4/2023), 240 mg (12/18/2023), 240 mg (1/10/2024), 240 mg (1/23/2024), 240 mg (4/23/2024), 240 mg (5/7/2024), 240 mg (5/22/2024), 240 mg (6/4/2024), 240 mg (6/18/2024), 240 mg (7/1/2024), 240 mg (7/16/2024), 240 mg (7/30/2024)  leucovorin calcium IVPB, 896 mg, Intravenous, Once, 21 of 23 cycles  Administration: 900 mg (7/31/2023), 900 mg (8/14/2023), 900 mg (8/28/2023), 900 mg (9/11/2023), 900 mg (9/25/2023), 900 mg (10/9/2023), 900 mg (10/23/2023), 900 mg (11/6/2023), 900 mg (11/20/2023), 900 mg (12/4/2023), 900 mg (12/18/2023), 850 mg (1/10/2024), 850 mg (1/23/2024), 800 mg (4/23/2024), 800 mg (5/7/2024), 800 mg (5/22/2024), 800 mg (6/4/2024), 800 mg (6/18/2024), 800 mg (7/1/2024), 800 mg (7/16/2024), 800 mg (7/30/2024)  oxaliplatin (ELOXATIN) chemo infusion, 85 mg/m2 = 190.4 mg, Intravenous, Once, 12 of 12 cycles  Dose modification: 68 mg/m2 (original dose 85 mg/m2, Cycle 11, Reason: Other (Must fill in a comment), Comment: increased fatigue)  Administration: 190.4 mg (7/31/2023), 190.4 mg (8/14/2023), 200 mg (8/28/2023), 200 mg  (9/11/2023), 200 mg (9/25/2023), 200 mg (10/9/2023), 200 mg (10/23/2023), 200 mg (11/6/2023), 200 mg (11/20/2023), 190.4 mg (12/4/2023), 150 mg (12/18/2023), 150 mg (1/10/2024)  fluorouracil (ADRUCIL) ambulatory infusion Soln, 1,200 mg/m2/day = 5,375 mg, Intravenous, Over 46 hours, 21 of 23 cycles     9/26/2023 -  Cancer Staged    Staging form: Colon and Rectum, AJCC 8th Edition  - Pathologic: pT3, pN0, cM1 - Signed by Vickie Israel MD on 4/3/2024  Total positive nodes: 0       3/12/2024 Surgery    A. Terminal ileum, appendix, right colon (right hemicolectomy):  - Adenocarcinoma (5.2cm)  - Forty-nine (49) lymph nodes negative for carcinoma (0/49)    - Acellular mucin present in one (1) lymph node   - See staging synoptic (ypT3N0)     1/8/2025 -  Chemotherapy    fluorouracil (ADRUCIL), 400 mg/m2 = 770 mg, 2 of 2 cycles  Administration: 770 mg (1/8/2025), 770 mg (1/22/2025)  irinotecan (CAMPTOSAR) chemo infusion, 173 mg (50 % of original dose 180 mg/m2), 8 of 11 cycles  Dose modification: 90 mg/m2 (original dose 180 mg/m2, Cycle 1, Reason: Anticipated Tolerance, Comment: 50% dose reduction due to pre-existing diarrhea)  Administration: 180 mg (1/8/2025), 180 mg (1/22/2025), 160 mg (3/5/2025), 160 mg (3/19/2025), 160 mg (4/2/2025), 160 mg (4/16/2025), 160 mg (4/30/2025)  leucovorin calcium IVPB, 768 mg, 2 of 2 cycles  Administration: 800 mg (1/8/2025), 800 mg (1/22/2025)  fluorouracil (ADRUCIL) ambulatory infusion Soln, 1,200 mg/m2/day = 4,610 mg, 8 of 11 cycles     Right tonsillar squamous cell carcinoma (HCC)   7/27/2023 Initial Diagnosis    Right tonsillar squamous cell carcinoma (HCC)     7/27/2023 -  Cancer Staged    Staging form: Pharynx - Oropharynx, AJCC 8th Edition  - Clinical stage from 7/27/2023: Stage III (cT1, cN3, cM0, p16+) - Signed by Evan Plummer MD on 7/27/2023  Stage prefix: Initial diagnosis       9/16/2024 - 11/13/2024 Radiation      Plan ID Energy Fractions Dose per Fraction (cGy)  Dose Correction (cGy) Total Dose Delivered (cGy) Elapsed Days   Head_Neck 6X-FFF 35 / 35 200 0 7,000 58    C2: 9/16/2024 - 11/13/2024 9/17/2024 - 10/21/2024 Chemotherapy    alteplase (CATHFLO), 2 mg, Intracatheter, Every 1 Minute as needed, 6 of 6 cycles  Administration: 2 mg (10/21/2024)  CISplatin (PLATINOL) infusion, 70 mg (original dose 40 mg/m2), Intravenous, Once, 6 of 6 cycles  Dose modification: 70 mg (original dose 40 mg/m2, Cycle 1, Reason: Anticipated Tolerance), 30 mg/m2 (original dose 40 mg/m2, Cycle 4, Reason: Neuropathy, Comment: dose reduction to 30 mg/m2 due to neuropathy)  Administration: 70 mg (9/17/2024), 70 mg (9/23/2024), 70 mg (9/30/2024), 59.1 mg (10/7/2024), 59.1 mg (10/14/2024), 59.1 mg (10/21/2024)  sodium chloride, 500 mL, Intravenous, Once, 6 of 6 cycles  Administration: 500 mL (9/17/2024), 500 mL (9/17/2024), 500 mL (9/23/2024), 500 mL (9/23/2024), 500 mL (9/30/2024), 500 mL (9/30/2024), 500 mL (10/7/2024), 500 mL (10/7/2024), 500 mL (10/14/2024), 500 mL (10/14/2024), 500 mL (10/21/2024)  fosaprepitant (EMEND) IVPB, 150 mg, Intravenous, Once, 6 of 6 cycles  Administration: 150 mg (9/17/2024), 150 mg (9/23/2024), 150 mg (9/30/2024), 150 mg (10/7/2024), 150 mg (10/14/2024), 150 mg (10/21/2024)  IVPB builder, , Intravenous, Once, 1 of 1 cycle  Administration: Unknown dose (10/21/2024)       Past Medical & Surgical Hx:   Patient Active Problem List   Diagnosis    Primary hypertension    Mixed hyperlipidemia    Malignant neoplasm of right female breast (HCC)    Vitamin D deficiency    Prediabetes    Right thyroid nodule    GERD (gastroesophageal reflux disease)    Obesity    Metastatic colon cancer to liver (HCC)    Right tonsillar squamous cell carcinoma (HCC)    Poor appetite    Nutritional anemia    Dehydration    Drug-induced constipation    Chemotherapy induced neutropenia (HCC)    Stage 3a chronic kidney disease (HCC)    Thrombocytopenia (HCC)    Neuropathy    Diastolic  dysfunction    Hypokalemia    Leukemoid reaction    GOGO (acute kidney injury) (HCC)    Acute post-operative pain    At risk for constipation    At risk for delirium    Palliative care encounter    Physical deconditioning    History of CVA (cerebrovascular accident)    Chronic nasal congestion    Elevated LFTs    Vitamin B12 deficiency    Neoplastic malignant related fatigue    Sensation, choking    S/P percutaneous endoscopic gastrostomy (PEG) tube placement (HCC)    Pancytopenia due to chemotherapy (HCC)    Cancer related pain    Hypomagnesemia    Functional diarrhea    Antineoplastic chemotherapy induced anemia    Hypertensive heart and renal disease with congestive heart failure (HCC)     Past Medical History:   Diagnosis Date    Anemia     Arthritis     Body mass index (BMI) 40.0-44.9, adult (HCC) 9/22/2023    Breast cancer (HCC) 12/17/2018    Cancer (HCC)     right breast, colon, liver, right tonsil    Cervical lymphadenopathy 3/21/2023    CT neck on 3/30/2023- Large right level 2A lymphadenopathy as described above and suspicious for neoplasm.  Correlation with histopathology is recommended.  Mild asymmetric prominence of the right palatine tonsil with otherwise no definitive nodular enhancing lesions identified along the course of the aerodigestive tract.     5/26/23- FNA of this node was nonrevealing for tissue etiology, but it d    Encounter for screening for HIV 07/07/2022    Follow-up examination 04/04/2023    GERD (gastroesophageal reflux disease)     Hyperlipidemia     Hypertension     Obesity     Stroke (HCC)     TIA     Stroke (HCC)     TIA     Transaminitis 09/22/2021    Vasomotor rhinitis 8/9/2018    Refilled flonase today. Stopped taking since January 2022     Past Surgical History:   Procedure Laterality Date    BREAST BIOPSY Right 11/23/2018    us guided bx cancer    BREAST SURGERY      COLONOSCOPY      ESOPHAGOSCOPY N/A 07/20/2023    Procedure: ESOPHAGOSCOPY;  Surgeon: David Chapa MD;   [facility];  Service: ENT    HEMICOLOECTOMY W/ ANASTOMOSIS Right 3/12/2024    Procedure: RIGHT COLECTOMY WITH ROBOTICS;  Surgeon: Vickie Israel MD;  [facility];  Service: Surgical Oncology    IR BIOPSY LIVER MASS  2023    IR CRYOABLATION  6/3/2024    IR GASTROSTOMY (G) TUBE CHECK/CHANGE/REINSERTION/UPSIZE  2025    IR GASTROSTOMY TUBE PLACEMENT  10/2/2024    IR PORT CHECK  2024    IR PORT PLACEMENT  2023    IR PORT STRIPPING  2024    LAPAROTOMY N/A 3/12/2024    Procedure: LAPAROTOMY EXPLORATORY W/ ROBOTICS;  Surgeon: Vickie Israel MD;  [facility];  Service: Surgical Oncology    KS BIOPSY NASOPHARYNX VISIBLE LESION SIMPLE N/A 10/9/2024    Procedure: NASOPHARYNGOSCOPY with biospy;  Surgeon: Juanito Matthew MD;  [facility];  Service: ENT    KS BRNCHSC INCL FLUOR GDNCE DX W/CELL WASHG SPX N/A 2023    Procedure: BRONCHOSCOPY;  Surgeon: David Chapa MD;  [facility];  Service: ENT    KS LARYNGOSCOPY W/BIOPSY MICROSCOPE/TELESCOPE N/A 2023    Procedure: MICRODIRECT LARYNGOSCOPY WITH BIOPSY;  Surgeon: David Chapa MD;  [facility];  Service: ENT    KS LARYNGOSCOPY W/WO TRACHEOSCOPY DX EXCEPT  N/A 10/9/2024    Procedure: DIRECT LARYNGOSCOPY;  Surgeon: Juanito Matthew MD;  [facility];  Service: ENT    KS MASTECTOMY PARTIAL W/AXILLARY LYMPHADENECTOMY Right 2018    Procedure: ULTRASOUND LOCALIZED PARTIAL MASTECTOMY W/SENTINEL NODE BIOPSY POSS AXILLARY DISSECTION;  Surgeon: Zahida Coronado MD;  Location: [facility];  Service: General    KS TONSILLECTOMY PRIMARY/SECONDARY AGE 12/> N/A 10/9/2024    Procedure: TONSILLECTOMY;  Surgeon: Juanito Matthew MD;  [facility];  Service: ENT    TUBAL LIGATION      US GUIDED BREAST BIOPSY RIGHT COMPLETE Right 2018    US GUIDED INJECTION FOR RESEARCH STUDY  2018    US GUIDED INJECTION FOR RESEARCH STUDY  2018    US  GUIDED INJECTION FOR RESEARCH STUDY  05/03/2019    US GUIDED LYMPH NODE BIOPSY RIGHT  05/26/2023       Review of Medications:   Vitamins, Supplements and Herbals: No, pt denies taking supplements    Current Outpatient Medications:     acetaminophen (TYLENOL) 160 mg/5 mL liquid, Take 20 mL (640 mg total) by mouth every 6 (six) hours as needed for mild pain for up to 10 days, Disp: 473 mL, Rfl: 3    anastrozole (ARIMIDEX) 1 mg tablet, Take 1 tablet (1 mg total) by mouth daily, Disp: 90 tablet, Rfl: 0    atorvastatin (LIPITOR) 40 mg tablet, Take 1 tablet (40 mg total) by mouth daily at bedtime, Disp: 30 tablet, Rfl: 2    cholestyramine (QUESTRAN) 4 g packet, Take 1 packet (4 g total) by mouth 3 (three) times a day with meals, Disp: 90 packet, Rfl: 3    diphenhydramine, lidocaine, Al/Mg hydroxide, simethicone (Magic Mouthwash) SUSP, Swish and swallow 10 mL every 4 (four) hours as needed for mouth pain or discomfort (Patient not taking: Reported on 1/31/2025), Disp: 480 mL, Rfl: 2    docusate sodium (COLACE) 50 mg capsule, Take 1 capsule (50 mg total) by mouth 2 (two) times a day, Disp: 90 capsule, Rfl: 1    dronabinol (MARINOL) 2.5 mg capsule, Take 1 capsule (2.5 mg total) by mouth in the morning, Disp: 5 capsule, Rfl: 0    ergocalciferol (VITAMIN D2) 50,000 units, Take 1 capsule (50,000 Units total) by mouth once a week, Disp: 90 capsule, Rfl: 0    fluorouracil 4,390 mg in CADD/Elastomeric Infusion Device, Infuse 4,390 mg (1,200 mg/m2/day x 1.83 m2) into a catheter in a vein via infusion device over 46 hours for 2 days  Infusion planned for May 14, 2025., Disp: 1 each, Rfl: 0    folic acid (FOLVITE) 1 mg tablet, Take 1 tablet (1 mg total) by mouth in the morning, Disp: 90 tablet, Rfl: 3    gabapentin (NEURONTIN) 300 mg capsule, Take 1 pills in the morning, 1 pill at lunch and 2 pills before bed, Disp: 120 capsule, Rfl: 0    hydrocortisone 1 % ointment, , Disp: , Rfl:     ibuprofen (MOTRIN) 100 mg/5 mL suspension, Take  20 mL (400 mg total) by mouth every 6 (six) hours as needed for moderate pain (Patient not taking: Reported on 1/31/2025), Disp: 473 mL, Rfl: 0    lidocaine-prilocaine (EMLA) cream, Apply topically as needed for mild pain (Patient not taking: Reported on 12/6/2024), Disp: 30 g, Rfl: 3    losartan (COZAAR) 50 mg tablet, Take 1 tablet (50 mg total) by mouth daily, Disp: 90 tablet, Rfl: 2    magnesium Oxide (MAG-OX) 400 mg TABS, 1 tablet (400 mg total) by Per G Tube route 2 (two) times a day (Patient not taking: Reported on 1/31/2025), Disp: 60 tablet, Rfl: 0    mirtazapine (REMERON) 15 mg tablet, Take 1 tablet (15 mg total) by mouth daily at bedtime Patient is also on a 30 mg tablet, for a total dose of 45 mg, Disp: 30 tablet, Rfl: 0    mirtazapine (REMERON) 30 mg tablet, Take 1 tablet (30 mg total) by mouth daily at bedtime, Disp: 30 tablet, Rfl: 0    nystatin (MYCOSTATIN) 500,000 units/5 mL suspension, Apply 5 mL (500,000 Units total) to the mouth or throat 4 (four) times a day, Disp: 600 mL, Rfl: 3    omeprazole (PriLOSEC) 20 mg delayed release capsule, Take 1 capsule (20 mg total) by mouth daily, Disp: 30 capsule, Rfl: 3    ondansetron (ZOFRAN) 8 mg tablet, Take 1 tablet (8 mg total) by mouth every 8 (eight) hours as needed for nausea or vomiting, Disp: 90 tablet, Rfl: 2    oxyCODONE (ROXICODONE) 5 mg/5 mL solution, Take 5 mL (5 mg total) by mouth every 6 (six) hours as needed for severe pain ((breakthrough pain)) Max Daily Amount: 20 mg, Disp: 473 mL, Rfl: 0    potassium chloride (Klor-Con M20) 20 mEq tablet, Take 1 tablet (20 mEq total) by mouth 2 (two) times a day (Patient not taking: Reported on 1/31/2025), Disp: 90 tablet, Rfl: 1    potassium chloride 10% oral solution, 15 mL (20 mEq total) by Per G Tube route 2 (two) times a day, Disp: 473 mL, Rfl: 0    prochlorperazine (COMPAZINE) 10 mg tablet, Take 1 tablet (10 mg total) by mouth every 6 (six) hours as needed for nausea or vomiting, Disp: 90 tablet, Rfl:  "2    pyridoxine (VITAMIN B6) 50 mg tablet, Take 1 tablet (50 mg total) by mouth daily, Disp: 90 tablet, Rfl: 3    vitamin B-12 (VITAMIN B-12) 1,000 mcg tablet, , Disp: , Rfl:   No current facility-administered medications for this visit.    Facility-Administered Medications Ordered in Other Visits:     alteplase (CATHFLO) injection 2 mg, 2 mg, Intracatheter, Q1MIN PRN, Harika Agostino, DO    alteplase (CATHFLO) injection 2 mg, 2 mg, Intracatheter, Q1MIN PRN, Harika Agostino, DO    alteplase (CATHFLO) injection 2 mg, 2 mg, Intracatheter, Q1MIN PRN, Harika Agostino, DO    Atropine Sulfate injection 0.25 mg, 0.25 mg, Intravenous, Once PRN, Harika Agostino, DO    irinotecan (CAMPTOSAR) 160 mg in sodium chloride 0.9 % 500 mL chemo infusion, 160 mg, Intravenous, Once, Harika Agostino, DO, Last Rate: 338.7 mL/hr at 05/14/25 1316, 160 mg at 05/14/25 1316    Most Recent Lab Results:   Lab Results   Component Value Date    WBC 10.49 (H) 05/12/2025    NEUTROABS 7.93 (H) 05/12/2025    IRON 21 (L) 02/28/2025    TIBC 170.8 (L) 02/28/2025    FERRITIN 712 (H) 02/28/2025    CHOLESTEROL 167 04/24/2024    TRIG 137 04/24/2024    HDL 43 (L) 04/24/2024    LDLCALC 97 04/24/2024    ALT 9 05/12/2025    AST 22 05/12/2025    ALB 3.7 05/12/2025    SODIUM 138 05/12/2025    SODIUM 136 05/07/2025    K 3.8 05/12/2025    K 4.1 05/07/2025     05/12/2025    BUN 29 (H) 05/12/2025    BUN 27 (H) 05/07/2025    CREATININE 1.12 05/12/2025    CREATININE 1.00 05/07/2025    EGFR 50 05/12/2025    PHOS 2.8 11/01/2024    PHOS 3.3 10/30/2024    TSH 1.58 01/03/2022    GLUCOSE 209 (H) 03/12/2024    POCGLU 89 12/27/2024    GLUF 102 (H) 10/30/2024    GLUF 95 09/13/2024    GLUC 106 05/12/2025    HGBA1C 5.5 02/21/2024    HGBA1C 5.9 (H) 06/13/2023    HGBA1C 6.1 (H) 07/09/2022    CALCIUM 9.9 05/12/2025    FOLATE 14.1 02/28/2025    MG 2.0 05/12/2025       Anthropometric Measurements:   Height: 66\"  Ht Readings from Last 1 Encounters:   05/14/25 5' 5\" (1.651 m) "     Wt Readings from Last 20 Encounters:   05/14/25 72.6 kg (160 lb 0.9 oz)   05/07/25 73 kg (161 lb)   04/30/25 73.5 kg (162 lb)   04/30/25 73 kg (161 lb)   04/16/25 73.5 kg (162 lb)   04/02/25 72.6 kg (160 lb 0.9 oz)   04/01/25 72.6 kg (160 lb)   03/19/25 70.8 kg (156 lb 1.4 oz)   03/18/25 71.2 kg (157 lb)   03/05/25 73.1 kg (161 lb 2.5 oz)   02/28/25 72.6 kg (160 lb)   02/18/25 73 kg (161 lb)   02/04/25 73.4 kg (161 lb 12.8 oz)   01/31/25 75.1 kg (165 lb 9.6 oz)   01/30/25 75.6 kg (166 lb 10.7 oz)   01/22/25 75.6 kg (166 lb 10.7 oz)   01/10/25 77.1 kg (170 lb)   01/08/25 76.5 kg (168 lb 10.4 oz)   12/31/24 82.6 kg (182 lb)   12/20/24 82.1 kg (181 lb)       Weight History:   Usual Weight: 275#  Varian: (2/22/19) 264.6#, (4/2/19) 263.4, (9/16/24) 197.4#, (9/23/24) 194#, (9/30/24) 192.8#, (10/7/24) 190.2#, (10/11/24) 194.6#, (10/14/24) 191#, (10/17/24) 192.8#, (10/21/24) 190#, (10/24/24) 190#, (10/28/24) 189#, (11/11/24) 185#  Home Scale: n/a    Oncology Nutrition-Anthropometrics      Flowsheet Row Nutrition from 5/14/2025 in [facility] Nutrition from 4/30/2025 in [facility]   Patient [age] (years): 70 years 70 years   Patient (female) height (in): 61 in 66 in   Current Weight to be used for anthropometric calculations (lbs) 160.1 lbs 161 lbs   Current Weight to be used for anthropometric calculations (kg) 72.8 kg 73.2 kg   BMI: 30.2 26   IBW female: 105 lbs 130 lbs   IBW (kg) female: 47.7 kg 59.1 kg   IBW % (female) 152.5 % 123.8 %   Adjusted BW (female): 118.8 lbs 137.8 lbs   Adjusted BW kg (female): 54 kg 62.6 kg   % weight change after 1 week: -0.6 % --   Weight change after 1 week (lbs) -0.9 lbs --   % weight change after 1 month: -1.2 % 0.6 %   Weight change after 1 month (lbs) -1.9 lbs 1 lbs   % weight change after 3 months: -0.6 % -2.8 %   Weight change after 3 months (lbs) -0.9 lbs -4.6 lbs            Nutrition-Focused Physical  Findings: n/a due to telephone call    Food/Nutrition-Related History & Client/Social History:    Current Nutrition Impact Symptoms:  [] Nausea  [x] Reduced Appetite  [] Acid Reflux    [] Vomiting  [] Unintended Wt Loss -stable now [] Malabsorption    [] Diarrhea -resolved [] Unintended Wt Gain  [] Dumping Syndrome    [] Constipation -BM every other day [] Thick Mucous/Secretions  [] Abdominal Pain    [x] Dysgeusia (Altered Taste) -starting to improve [x] Xerostomia (Dry Mouth)  [] Gas    [] Dysosmia (Altered Smell)  [] Thrush  [] Difficulty Chewing    [] Oral Mucositis (Sore Mouth)  [x] Fatigue  [] Hyperglycemia   Lab Results   Component Value Date    HGBA1C 5.5 2024      [x] Odynophagia -improving [] Esophagitis  [] Other:    [x] Dysphagia   Follows with SLP  Mechanical soft per SLP recommendation  Video swallow scheduled for  [] Early Satiety  [] No Problems Eating      Food Allergies & Intolerances: no    Current Diet: Soft/Moist and Tube Feeding  Current Nutrition Intake: Unchanged from last visit  Appetite: Fair   Nutrition Route: PO and PEG  Oral Care: brushes daily  Activity level: ADLs.     24 Hr Diet Recall:   Breakfast: oatmeal  Lunch: egg salad (no bread)  Dinner: lasagna. States this was tough to eat    Beverages: water (sips throughout the day)  Supplements:   Ensure Complete (10 oz, 350 kcal, 30 g pro) -2 daily, usually puts through tube, sometimes will drink them    EN Recall:  DME: [employer]  Access Type: PEG  Formula: [employer] Peptide 1.5  Method: Bolus  Regimen: 11oz (325 mL) 2  times per day of [employer] 1.5 via PEG (gravity syringe method to administer boluses; 1 container infusing over ~15-20 minutes).  Flush: 60 mL free water flush  pre/post each bolus (120 mL x 2 boluses = 240 mL)  EN providin mL volume (2 cartons/day), 1000 kcal (46% est needs), 48g protein (55% est needs), 456 mL free water, 696 mL total water (32% est needs).  Pt also flushing 1-2 syringes every few  hours for added hydration. Pt gets IVF 3x/week.       Oncology Nutrition-Estimated Needs      Flowsheet Row Nutrition from 2025 in [facility] Nutrition from 2025 in [facility]   Weight type used Actual weight Actual weight   Weight in kilograms (kg) used for estimated needs 73.2 kg 77.3 kg   Energy needs formula:  30-35 kcal/kg 30-35 kcal/kg   Energy needs based on 30 kcal/k kcal 2318 kcal   Energy needs based on 35 kcal/k kcal 2705 kcal   Protein needs formula: 1.2-1.5 g/kg 1.2-1.5 g/kg   Protein needs based on 1.2 g/k g 93 g   Protein needs based on 1.5 g/kg 110 g 116 g   Fluid needs formula: 30-35 mL/kg 30-35 mL/kg   Fluid needs based on 30 mL/kg 2196 mL 2319 mL   Fluid needs in ounces 74 oz 78 oz   Fluid needs based on 35 mL/kg 2562 mL 2706 mL   Fluid needs in ounces 87 oz 91 oz             Discussion & Intervention:   Maira was evaluated today for an RD follow up regarding HNC and enteral nutrition.  Maira has completed tx for HNC and is currently undergoing tx for metastatic colon cancer.  met with Maira today during her infusion. She is doing well. She continues to work with SLP. She feels like she's eating about the same since we last spoke. She has been having 3 meals/day although they are small amounts/portions. She is maintaining her weight. She continues to utilize her PEG for nutrition and is doing 2 cartons of Qnovo daily, which provides about 50% of her estimated needs. She is also consuming 2 Ensure shakes daily- she will sometimes drink them, other times she puts them through the tube. Encouraged her to consume them orally as 1st choice, if able. We discussed starting to wean TF and cut back to 1 carton of Shayy Adst[employer] via tube. I discussed how many calories she needs to consume orally to compensate for the wean and reviewed some options.   Reviewed 24 hour recall, which revealed  an adequate enteral intake and PO intake, and discussed ways to optimize nutrient intake.  Also reviewed the importance of wt management throughout the tx process and the role of enteral nutrition in managing wt and overall health.  Based on today's assessment, discussion included: MNT for:   fluid, soft/mechanical/pureed diet,  enteral nutrition, practicing proper oral care, adequate hydration & fluid choices, utilizing oral nutrition supplements, practicing proper feeding tube use & care, adherence to and compliance with enteral nutrition plan of care, and individualized enteral nutrition recommendations & plan:  .   Moving forward, Maira was encouraged to start weaning TF and continue increasing oral intake   Materials Provided: not applicable-received some Ensure samples today  All questions and concerns addressed during today’s visit.  Maira has RD contact information.    Nutrition Diagnosis:   Increased Nutrient Needs (kcal & pro) related to increased demand for nutrients and disease state as evidenced by cancer dx and pt undergoing tx for cancer.  Swallowing Difficulty related to diagnosis/treatment related causes as evidenced by  need for feeding tube, diet restriction per SLP.  Monitoring & Evaluation:   Goals:  weight maintenance/stabilization  adequate nutrition impact symptom management  pt to meet >/=75% estimated nutrition needs daily  Utilize PEG for supplemental nutrition    Progress Towards Goals: Progressing    Nutrition Rx & Recommendations:  Diet: enteral nutrition, soft/moist high calorie high protein  Stay hydrated by sipping fluids of choice/tolerance throughout the day.    Follow proper oral care; Try baking soda/salt water rinse recipe (mix 3/4 tsp salt + 1 tsp baking soda + 1 qt water; rinse with plain water after using) in Eating Hints book (pg 18).  Brush your teeth before/after meals & before bed.  Weigh yourself regularly. If you notice weight loss, make an effort to increase your  daily food/calorie intake. If you continue to notice loss after these efforts, reach out to your dietitian to establish a plan to stabilize weight.   Continue with Ensure Plus/Complete 2 times daily. try drinking orally, but OK to put through PEG if tolerated better  Choose liquids with calories: whole milk, Fairlife milk (higher protein/lactose-free milk), chocolate milk, drinkable yogurt, kefir, 100% fruit juice, diluted juice, bone broth (higher protein broth), creamy soups, sports drinks (Gatorade, Poweraide, Pedialyte, etc.), Italian ice, popsicles, milkshakes, smoothies, oral nutrition supplements (Ensure, Boost, etc.), gelatin/Jello, etc.  Soft/easy to swallow foods that are high in calories and protein: casseroles, chicken/egg/tuna salad with extra garcia to add calories and moisture, oatmeal/cream of wheat made with whole milk, cottage cheese, whole milk Greek yogurt, scrambled eggs with cheese, avocado, macaroni and cheese, mashed potatoes made with butter and whole milk or dry milk powder, ground meat with extra sauce/gravy, canned fruit, peanut butter, cream soups (cream of chicken, cream of mushroom, broccoli cheddar), pudding made with whole milk, custard, ice cream, banana, milkshakes/smoothies, Review page 47 of Eating Hints Book for more ideas.   TF plan -   TF Goal:  Continue [facility] 1.5, wean to 1 carton/day  Contact RD for substitutions  Call [facility] when you have 10 days left of TF supplies: [phone], option 3 (customer support).  If you notice weight loss or difficulty consuming enough PO, can increase back to 2 cartons/day  High calorie snacks/meals to have when cutting back on TF  1 cup wholly fat greek yogurt with 1 cup of berries with 2 tbsp honey drizzled  1/2 cup of oatmeal made with 1 cup of whole milk + 2 tbsp melted peanut butter  Chicken, tuna, or egg salad (1 cup) and 1 cup whole milk   Milkshake made with 2 tablespoon peanut or almond butter, 1 frozen banana, ½ cup whole  milk, ½ cup ice cream   Snack wrap: 1 flour tortilla, 2 slices ham, 2 slices cheese, and 1 tablespoon mayonnaise   Stuffed domitila: ¼ cup hummus, 1/2-1 whole avocado, 1/2 cup mashed beans    Follow Up Plan: during infusion on 5/28/25   Recommend Referral to Other Providers: none at this time

## 2025-05-14 NOTE — PATIENT INSTRUCTIONS
Nutrition Rx & Recommendations:  Diet: enteral nutrition, soft/moist high calorie high protein  Stay hydrated by sipping fluids of choice/tolerance throughout the day.   Follow proper oral care; Try baking soda/salt water rinse recipe (mix 3/4 tsp salt + 1 tsp baking soda + 1 qt water; rinse with plain water after using) in Eating Hints book (pg 18).  Brush your teeth before/after meals & before bed.  Weigh yourself regularly. If you notice weight loss, make an effort to increase your daily food/calorie intake. If you continue to notice loss after these efforts, reach out to your dietitian to establish a plan to stabilize weight.   Continue with Ensure Plus/Complete 2 times daily. try drinking orally, but OK to put through PEG if tolerated better  Choose liquids with calories: whole milk, Fairlife milk (higher protein/lactose-free milk), chocolate milk, drinkable yogurt, kefir, 100% fruit juice, diluted juice, bone broth (higher protein broth), creamy soups, sports drinks (Gatorade, Poweraide, Pedialyte, etc.), Italian ice, popsicles, milkshakes, smoothies, oral nutrition supplements (Ensure, Boost, etc.), gelatin/Jello, etc.  Soft/easy to swallow foods that are high in calories and protein: casseroles, chicken/egg/tuna salad with extra garcia to add calories and moisture, oatmeal/cream of wheat made with whole milk, cottage cheese, whole milk Greek yogurt, scrambled eggs with cheese, avocado, macaroni and cheese, mashed potatoes made with butter and whole milk or dry milk powder, ground meat with extra sauce/gravy, canned fruit, peanut butter, cream soups (cream of chicken, cream of mushroom, broccoli cheddar), pudding made with whole milk, custard, ice cream, banana, milkshakes/smoothies, Review page 47 of Eating Hints Book for more ideas.   TF plan -   TF Goal:  Continue [employer] 1.5, wean to 1 carton/day  Contact RD for substitutions  Call [facility] when you have 10 days left of TF supplies: [phone],  option 3 (customer support).  If you notice weight loss or difficulty consuming enough PO, can increase back to 2 cartons/day  High calorie snacks/meals to have when cutting back on TF  1 cup wholly fat greek yogurt with 1 cup of berries with 2 tbsp honey drizzled  1/2 cup of oatmeal made with 1 cup of whole milk + 2 tbsp melted peanut butter  Chicken, tuna, or egg salad (1 cup) and 1 cup whole milk   Milkshake made with 2 tablespoon peanut or almond butter, 1 frozen banana, ½ cup whole milk, ½ cup ice cream   Snack wrap: 1 flour tortilla, 2 slices ham, 2 slices cheese, and 1 tablespoon mayonnaise   Stuffed domitila: ¼ cup hummus, 1/2-1 whole avocado, 1/2 cup mashed beans    Follow Up Plan: during infusion on 5/28/25   Recommend Referral to Other Providers: none at this time

## 2025-05-14 NOTE — PROGRESS NOTES
Pt arrives with no new complaints, tolerated FOLFIRI well without complications, 5FU BRANDIE connected to pt with white clamp open, aware to return for disconnect, Friday 5-16-25 1:00, AVS provided

## 2025-05-15 ENCOUNTER — OFFICE VISIT (OUTPATIENT)
Dept: SPEECH THERAPY | Facility: CLINIC | Age: 70
End: 2025-05-15
Attending: INTERNAL MEDICINE
Payer: MEDICARE

## 2025-05-15 DIAGNOSIS — Z90.89 STATUS POST TONSILLECTOMY: ICD-10-CM

## 2025-05-15 DIAGNOSIS — C09.9 RIGHT TONSILLAR SQUAMOUS CELL CARCINOMA (HCC): ICD-10-CM

## 2025-05-15 DIAGNOSIS — R13.12 DYSPHAGIA, OROPHARYNGEAL PHASE: Primary | ICD-10-CM

## 2025-05-15 PROCEDURE — 92526 ORAL FUNCTION THERAPY: CPT | Performed by: SPEECH-LANGUAGE PATHOLOGIST

## 2025-05-15 NOTE — PROGRESS NOTES
Daily Speech Treatment Note    Today's date: 5/15/2025  Patient’s name: Maira Moura  : 1955  MRN: [id number]  Safety measures: HTN, CVA, GERD, active CA, s/p PEG tube placement, aspiration precautions  Current recommended diet: PEG tube for primary nutrition/hydration; mechanical soft with thin liquids P.O. as tolerated   Referring provider: Harika Medina DO    Encounter Diagnosis     ICD-10-CM    1. Dysphagia, oropharyngeal phase  R13.12       2. Status post tonsillectomy  Z90.89       3. 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    Objective/Assessment:    Short-term goals:  Patient will receive a videofluoroscopic swallow study (VFSS) to assess her current swallowing function to r/o penetration or aspiration, to determine the safest, least restrictive diet, and to recommended the appropriate rehabilitation exercises (to be achieved in 2-3 weeks).  Repeat VBSS is scheduled 25 ([place])    Patient will tolerate P.O. trials of her recommended diet with the implementation of safe swallowing strategies without overt s/sx of penetration and/or aspiration during skilled therapy sessions in this certification period (to be achieved in 4-6 weeks).    Traditional VitalStim     Channel(s) used: 1, 2   Placement: 3a   Duty Cycle: 57/1 s   Frequency: 80 pulses per second   Phase Duration: 300 microseconds   Treatment Duration: 38 minutes   Min mA level: 7.5 mA   Max mA level: 10.5 mA      Patient tolerated NMES in conjunction with pharyngeal/laryngeal strengthening exercises and P.O. intake. (The patient received instruction on  the potential benefits from NMES treatment, including neuromuscular re-education and muscle strengthening.) Skin condition post-NMES: intact.      Patient consumed the following during today’s session: egg salad and water (thin liquid) via side of cup -- NMES remained on during pharyngeal strengthening exercises (see below).     Patient finished her whole meal today, which is an improvement. Patient stated that she was hungry. No c/o pain (took medication at home).    Oral containment, mastication, bolus formation/control, AP transfer, and swallow initiation were judged to be WFL. Reduced hyolaryngeal elevation and excursion noted upon palpation. No oral cavity residue was observed. Patient without any c/o globus sensation. Patient demonstrated throat clear x1. No other overt s/sx of penetration/aspiration noted during today's session.     Patient will independently complete pharyngeal strengthening exercises (e.g., Effortful swallow, Mendelsohn, Evelia) daily to facilitate increased pharyngeal strength and coordination (to be achieved in 4-6 weeks).  -Patient completed the following pharyngeal strengthening exercises during today's session:  *Effortful swallow: 2 sets of 10 reps  *Mendelsohn: 1 set of 10 reps  *Evelia: 2 sets of 10 reps    Plan:  -Patient was provided with home exercises/activities to target goals in plan of care at the end of today's session.  -Continue with current plan of care.

## 2025-05-16 ENCOUNTER — HOSPITAL ENCOUNTER (OUTPATIENT)
Dept: INFUSION CENTER | Facility: CLINIC | Age: 70
End: 2025-05-16
Attending: INTERNAL MEDICINE
Payer: MEDICARE

## 2025-05-16 DIAGNOSIS — C78.7 METASTATIC COLON CANCER TO LIVER (HCC): Primary | ICD-10-CM

## 2025-05-16 DIAGNOSIS — C18.9 METASTATIC COLON CANCER TO LIVER (HCC): Primary | ICD-10-CM

## 2025-05-16 RX ADMIN — PEGFILGRASTIM 6 MG: 6 INJECTION SUBCUTANEOUS at 13:26

## 2025-05-16 NOTE — PROGRESS NOTES
Pt here for BRANDIE disconnect and neulasta injection.  BRANDIE fully deflated and infused over 46 hrs.  Pt offers no complaints.  BRANDIE disconnected and port flushed per protocol.  Pt was provided with AVS and is aware of her next appt on May 19 at 12:30

## 2025-05-18 NOTE — PROGRESS NOTES
Daily Speech Treatment Note    Today's date: 3/13/2025 ***  Patient’s name: Maira Moura  : 1955  MRN: [id number]  Safety measures: HTN, CVA, GERD, active CA, s/p PEG tube placement, aspiration precautions  Current recommended diet: PEG tube for primary nutrition/hydration; puree with thin liquids P.O. as tolerated   Referring provider: Harika Medina DO Encounter Diagnosis     ICD-10-CM    1. Dysphagia, oropharyngeal phase  R13.12       2. Status post tonsillectomy  Z90.89       3. 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 ***    Short-term goals:  Patient will tolerate P.O. trials of her recommended diet with the implementation of safe swallowing strategies without overt s/sx of penetration and/or aspiration during skilled therapy sessions in this certification period (to be achieved in 4-6 weeks). -- PARTIALLY MET    Traditional VitalStim     Channel(s) used: 1, 2   Placement: 3a   Duty Cycle: 57/1 s   Frequency: 80 pulses per second   Phase Duration: 300 microseconds   Treatment Duration: 40 minutes   Min mA level: 3.0 mA   Max mA level: 5.0 mA      Patient tolerated NMES in conjunction with pharyngeal/laryngeal strengthening exercises and P.O. intake. (The patient received instruction on the potential benefits from NMES treatment, including neuromuscular re-education and muscle strengthening.) Skin condition post-NMES: intact.      Patient consumed the following during today’s session: cut up peaches, pears and pineapple (no juice); and water (thin liquid) via side of cup -- NMES remained on during pharyngeal  "strengthening exercises (see below).       Reporting fruit \"gets stuck\" - increased coughing noted  Patient provided with puree (chocolate pudding) and attempted to use to clear any residue.      Attempted to alternate peaches and pudding to assist with clearance of residue in valleculae. Patient reports mild success with this strategy.      Patient without any c/o odynophagia. Patient indicated that she remembered to take her pain medication today.     Patient appeared to have adequate anterior closure, A to P transfer, bolus formation/control, and swallow initiation.  No other overt s/sx of penetration/aspiration noted during today's session. Patient implemented small bites and slow rate with P.O. intake, as well as liquid washes as needed.        Patient will perform oral motor exercises using IOPI device on lingual muscles to facilitate increased function and strength by 25% for improved swallowing function (to be achieved in 6-8 weeks). -- PARTIALLY MET     IOPI Pro Pmax Data Tracker Grid (from [id number]):       01/09/25 01/14/25 01/23/25 ***        Tongue tip (GOAL = 56) 50 49 49         Tongue back (GOAL = 50) 47 54 61               IOPI Pro -- Today's Exercise Targets:       Pmax (after 3 trials): Effort level: Target for today's treatment:   Tongue tip  (GOAL = 56) 49 75% (pumps)  50% (holds)    37  25   Tongue back  (GOAL = 50) 61 75% (pumps)  50% (holds)    46  30         IOPI Pro -- Exercise Tracker Grid (from [id number]):       01/09/25 01/14/25 01/23/25 ***        Tongue tip 3 sets of 30 reps (pumps)    3 sets of 30 reps (pumps)     Endurance holds (50% of max) x 2; avg 20    3 sets of 30 reps (pumps)     1 sets of 3 reps (20.47 second holds)         Tongue back 3 sets of 30 reps (pumps)    3 sets of 30 reps (pumps)     Endurance holds (50% of max) x 2; avg 23 sec    3 sets of 30 reps (pumps)     1 sets of 3 reps (16.92 second holds)            Patient will independently complete " pharyngeal strengthening exercises (e.g., Effortful swallow, Mendelsohn, Evelia) daily to facilitate increased pharyngeal strength and coordination (to be achieved in 4-6 weeks). -- PARTIALLY MET  -Patient completed the following pharyngeal strengthening exercises during today's session: ***  *Effortful swallow: 2 sets of 10 reps  *Mendelsohn: 1 set of 10 reps  *Evelia: 2 sets of 10 reps     Plan:  -Patient was provided with home exercises/activities to target goals in plan of care at the end of today's session.  -Continue with current plan of care.   (1) body pink, extremities blue (0) blue, pale

## 2025-05-19 ENCOUNTER — HOSPITAL ENCOUNTER (OUTPATIENT)
Dept: INFUSION CENTER | Facility: CLINIC | Age: 70
Discharge: HOME | End: 2025-05-19
Attending: INTERNAL MEDICINE
Payer: MEDICARE

## 2025-05-19 VITALS
HEART RATE: 97 BPM | DIASTOLIC BLOOD PRESSURE: 84 MMHG | SYSTOLIC BLOOD PRESSURE: 123 MMHG | RESPIRATION RATE: 18 BRPM | TEMPERATURE: 97.5 F

## 2025-05-19 DIAGNOSIS — E86.0 DEHYDRATION: Primary | ICD-10-CM

## 2025-05-19 LAB
ALBUMIN SERPL BCG-MCNC: 3.5 G/DL (ref 3.5–5)
ALP SERPL-CCNC: 188 U/L (ref 34–104)
ALT SERPL W P-5'-P-CCNC: 9 U/L (ref 7–52)
ANION GAP SERPL CALCULATED.3IONS-SCNC: 6 MMOL/L (ref 4–13)
ANISOCYTOSIS BLD QL SMEAR: PRESENT
AST SERPL W P-5'-P-CCNC: 24 U/L (ref 13–39)
BASOPHILS # BLD MANUAL: 0 THOUSAND/UL (ref 0–0.1)
BASOPHILS NFR MAR MANUAL: 0 % (ref 0–1)
BILIRUB SERPL-MCNC: 0.25 MG/DL (ref 0.2–1)
BUN SERPL-MCNC: 30 MG/DL (ref 5–25)
CALCIUM SERPL-MCNC: 9.4 MG/DL (ref 8.4–10.2)
CHLORIDE SERPL-SCNC: 98 MMOL/L (ref 96–108)
CO2 SERPL-SCNC: 29 MMOL/L (ref 21–32)
CREAT SERPL-MCNC: 1.08 MG/DL (ref 0.6–1.3)
EOSINOPHIL # BLD MANUAL: 1.1 THOUSAND/UL (ref 0–0.4)
EOSINOPHIL NFR BLD MANUAL: 2 % (ref 0–6)
ERYTHROCYTE [DISTWIDTH] IN BLOOD BY AUTOMATED COUNT: 15.1 % (ref 11.6–15.1)
GFR SERPL CREATININE-BSD FRML MDRD: 52 ML/MIN/1.73SQ M
GLUCOSE SERPL-MCNC: 95 MG/DL (ref 65–140)
HCT VFR BLD AUTO: 27.7 % (ref 34.8–46.1)
HGB BLD-MCNC: 9 G/DL (ref 11.5–15.4)
LYMPHOCYTES # BLD AUTO: 1.1 THOUSAND/UL (ref 0.6–4.47)
LYMPHOCYTES # BLD AUTO: 2 % (ref 14–44)
MAGNESIUM SERPL-MCNC: 2 MG/DL (ref 1.9–2.7)
MCH RBC QN AUTO: 32.6 PG (ref 26.8–34.3)
MCHC RBC AUTO-ENTMCNC: 32.5 G/DL (ref 31.4–37.4)
MCV RBC AUTO: 100 FL (ref 82–98)
MONOCYTES # BLD AUTO: 1.1 THOUSAND/UL (ref 0–1.22)
MONOCYTES NFR BLD: 2 % (ref 4–12)
NEUTROPHILS # BLD MANUAL: 51.84 THOUSAND/UL (ref 1.85–7.62)
NEUTS SEG NFR BLD AUTO: 94 % (ref 43–75)
OVALOCYTES BLD QL SMEAR: PRESENT
PLATELET # BLD AUTO: 223 THOUSANDS/UL (ref 149–390)
PLATELET BLD QL SMEAR: ADEQUATE
PMV BLD AUTO: 11.1 FL (ref 8.9–12.7)
POIKILOCYTOSIS BLD QL SMEAR: PRESENT
POTASSIUM SERPL-SCNC: 4.1 MMOL/L (ref 3.5–5.3)
PROT SERPL-MCNC: 7.3 G/DL (ref 6.4–8.4)
RBC # BLD AUTO: 2.76 MILLION/UL (ref 3.81–5.12)
RBC MORPH BLD: PRESENT
SODIUM SERPL-SCNC: 133 MMOL/L (ref 135–147)
WBC # BLD AUTO: 55.15 THOUSAND/UL (ref 4.31–10.16)

## 2025-05-19 PROCEDURE — 83735 ASSAY OF MAGNESIUM: CPT | Performed by: INTERNAL MEDICINE

## 2025-05-19 PROCEDURE — 80053 COMPREHEN METABOLIC PANEL: CPT | Performed by: INTERNAL MEDICINE

## 2025-05-19 PROCEDURE — 85007 BL SMEAR W/DIFF WBC COUNT: CPT | Performed by: INTERNAL MEDICINE

## 2025-05-19 PROCEDURE — 85027 COMPLETE CBC AUTOMATED: CPT | Performed by: INTERNAL MEDICINE

## 2025-05-19 RX ADMIN — SODIUM CHLORIDE 1000 ML: 9 INJECTION, SOLUTION INTRAVENOUS at 12:58

## 2025-05-19 NOTE — PLAN OF CARE
Problem: Potential for Falls  Goal: Patient will remain free of falls  Description: INTERVENTIONS:  - Educate patient/family on patient safety including physical limitations  - Instruct patient to call for assistance with activity   - Consider consulting OT/PT to assist with strengthening/mobility based on AM PAC & JH-HLM score  - Consult OT/PT to assist with strengthening/mobility   - Keep Call bell within reach  - Keep bed low and locked with side rails adjusted as appropriate  - Keep care items and personal belongings within reach  - Initiate and maintain comfort rounds  - Make Fall Risk Sign visible to staff  - Apply yellow socks and bracelet for high fall risk patients  - Consider moving patient to room near nurses station  Outcome: Progressing      ----- Message from Latia Deluna sent at 10/16/2017 11:47 AM CDT -----  Contact: patient  Patient called requesting a sooner appointment due to he will be out of town on appointment date. Please call back to advise at [phone]. Thanks,

## 2025-05-19 NOTE — PROGRESS NOTES
Patient presents for hydration and labs today and is without complaints at this time. Labs drawn per protocol. Patient tolerated infusion without incident. Declines AVS. Aware of next appointment on 5/21 @ 1pm.

## 2025-05-20 ENCOUNTER — OFFICE VISIT (OUTPATIENT)
Dept: SPEECH THERAPY | Facility: CLINIC | Age: 70
End: 2025-05-20
Attending: INTERNAL MEDICINE
Payer: MEDICARE

## 2025-05-20 DIAGNOSIS — Z90.89 STATUS POST TONSILLECTOMY: ICD-10-CM

## 2025-05-20 DIAGNOSIS — C09.9 RIGHT TONSILLAR SQUAMOUS CELL CARCINOMA (HCC): ICD-10-CM

## 2025-05-20 DIAGNOSIS — R94.8 ABNORMAL PET SCAN OF COLON: ICD-10-CM

## 2025-05-20 DIAGNOSIS — C78.7 METASTATIC COLON CANCER TO LIVER (HCC): ICD-10-CM

## 2025-05-20 DIAGNOSIS — R13.12 DYSPHAGIA, OROPHARYNGEAL PHASE: Primary | ICD-10-CM

## 2025-05-20 DIAGNOSIS — C18.9 METASTATIC COLON CANCER TO LIVER (HCC): ICD-10-CM

## 2025-05-20 PROCEDURE — 92526 ORAL FUNCTION THERAPY: CPT

## 2025-05-20 RX ORDER — SODIUM CHLORIDE 9 MG/ML
20 INJECTION, SOLUTION INTRAVENOUS ONCE
OUTPATIENT
Start: 2025-05-28

## 2025-05-20 RX ORDER — OMEPRAZOLE 20 MG/1
20 CAPSULE, DELAYED RELEASE ORAL DAILY
Qty: 30 CAPSULE | Refills: 3 | Status: SHIPPED | OUTPATIENT
Start: 2025-05-20

## 2025-05-20 RX ORDER — ATROPINE SULFATE 1 MG/ML
0.25 INJECTION, SOLUTION INTRAVENOUS ONCE AS NEEDED
OUTPATIENT
Start: 2025-05-28

## 2025-05-20 RX ORDER — ATROPINE SULFATE 1 MG/ML
0.25 INJECTION, SOLUTION INTRAVENOUS ONCE
OUTPATIENT
Start: 2025-05-28

## 2025-05-20 RX ORDER — PROCHLORPERAZINE MALEATE 10 MG
10 TABLET ORAL EVERY 6 HOURS PRN
Qty: 90 TABLET | Refills: 2 | Status: SHIPPED | OUTPATIENT
Start: 2025-05-20

## 2025-05-20 NOTE — PROGRESS NOTES
Daily Speech Treatment Note    Today's date: 2025  Patient’s name: Maira Moura  : 1955  MRN: [id number]  Safety measures: HTN, CVA, GERD, active CA, s/p PEG tube placement, aspiration precautions  Current recommended diet: PEG tube for primary nutrition/hydration; mechanical soft with thin liquids P.O. as tolerated   Referring provider: Harika Medina DO Encounter Diagnosis     ICD-10-CM    1. Dysphagia, oropharyngeal phase  R13.12       2. Status post tonsillectomy  Z90.89       3. 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         Visit Tracking:  POC   Expires Auth Expiration Date ST Visit Limit   2025 or 10th visit 2025 BOMN              Visit/Unit Tracking:  Auth Status Date 05/01/25 05/06/25 05/08/25 05/13/25 05/15/25 05/20/25      Not required Used 1 2 3 4 5 6        Remaining 9 8 7 6 5 4         Subjective/Behavioral:  -Patient reported that things aren't going down great today    Objective/Assessment:    Short-term goals:  Patient will receive a videofluoroscopic swallow study (VFSS) to assess her current swallowing function to r/o penetration or aspiration, to determine the safest, least restrictive diet, and to recommended the appropriate rehabilitation exercises (to be achieved in 2-3 weeks).  Repeat VBSS is scheduled 25 ([place])    Patient will tolerate P.O. trials of her recommended diet with the implementation of safe swallowing strategies without overt s/sx of penetration and/or aspiration during skilled therapy sessions in this certification period (to be achieved in 4-6 weeks).    Traditional VitalStim     Channel(s) used: 1, 2   Placement: 3a   Duty Cycle: 57/1 s   Frequency: 80 pulses per second   Phase Duration: 300 microseconds   Treatment Duration: 38 minutes   Min mA level: 5.0 mA   Max mA level: 5.5  mA      Patient tolerated NMES in conjunction with pharyngeal/laryngeal strengthening exercises and P.O. intake. (The patient received  "instruction on the potential benefits from NMES treatment, including neuromuscular re-education and muscle strengthening.) Skin condition post-NMES: intact.      Patient consumed the following during today’s session: yogurt with diced peaches and water (thin liquid) via side of cup -- NMES remained on during pharyngeal strengthening exercises (see below).     Patient demonstrated significant episodes of coughing to start today; reporting \"it just won't go down today\"   It should be noted that her anterior neck continues to fill with fluid (lymphedema) - questionable internal lymph causing more tightness in her thought.     Oral containment, mastication, bolus formation/control, AP transfer, and swallow initiation were judged to be WFL. Reduced hyolaryngeal elevation and excursion noted upon palpation. No oral cavity residue was observed. Patient without any c/o globus sensation. Patient demonstrated throat clears throughout taking in PO (due to chunks of fruit in the yogurt). No other overt s/sx of penetration/aspiration noted during today's session.     Patient will independently complete pharyngeal strengthening exercises (e.g., Effortful swallow, Mendelsohn, Evelia) daily to facilitate increased pharyngeal strength and coordination (to be achieved in 4-6 weeks).  -Patient completed the following pharyngeal strengthening exercises during today's session:  *Effortful swallow: 1 sets of 10 reps  *Mendelsohn: 1 set of 10 reps  *Evelia: 1 sets of 10 reps    Plan:  -Patient was provided with home exercises/activities to target goals in plan of care at the end of today's session.  -Continue with current plan of care.  "

## 2025-05-20 NOTE — TELEPHONE ENCOUNTER
Rec'd call from patient asking for a refill of her Omeprazole and Compazine. I advised her that we will send these in for her today. Patient verbalized understanding and is in agreement with the plan.

## 2025-05-21 ENCOUNTER — HOSPITAL ENCOUNTER (OUTPATIENT)
Dept: RADIOLOGY | Facility: HOSPITAL | Age: 70
Discharge: HOME | End: 2025-05-21
Payer: MEDICARE

## 2025-05-21 ENCOUNTER — HOSPITAL ENCOUNTER (OUTPATIENT)
Dept: INFUSION CENTER | Facility: CLINIC | Age: 70
Discharge: HOME | End: 2025-05-21
Attending: INTERNAL MEDICINE
Payer: MEDICARE

## 2025-05-21 VITALS
SYSTOLIC BLOOD PRESSURE: 124 MMHG | DIASTOLIC BLOOD PRESSURE: 82 MMHG | TEMPERATURE: 97 F | HEART RATE: 92 BPM | RESPIRATION RATE: 18 BRPM

## 2025-05-21 DIAGNOSIS — C18.9 METASTATIC COLON CANCER TO LIVER (HCC): Primary | ICD-10-CM

## 2025-05-21 DIAGNOSIS — C50.411 MALIGNANT NEOPLASM OF UPPER-OUTER QUADRANT OF RIGHT BREAST IN FEMALE, ESTROGEN RECEPTOR POSITIVE (HCC): ICD-10-CM

## 2025-05-21 DIAGNOSIS — C18.9 METASTATIC COLON CANCER TO LIVER (HCC): ICD-10-CM

## 2025-05-21 DIAGNOSIS — Z17.0 MALIGNANT NEOPLASM OF UPPER-OUTER QUADRANT OF RIGHT BREAST IN FEMALE, ESTROGEN RECEPTOR POSITIVE (HCC): ICD-10-CM

## 2025-05-21 DIAGNOSIS — C09.9 RIGHT TONSILLAR SQUAMOUS CELL CARCINOMA (HCC): ICD-10-CM

## 2025-05-21 DIAGNOSIS — C78.7 METASTATIC COLON CANCER TO LIVER (HCC): Primary | ICD-10-CM

## 2025-05-21 DIAGNOSIS — C78.7 METASTATIC COLON CANCER TO LIVER (HCC): ICD-10-CM

## 2025-05-21 DIAGNOSIS — E86.0 DEHYDRATION: Primary | ICD-10-CM

## 2025-05-21 LAB
ALBUMIN SERPL BCG-MCNC: 3.6 G/DL (ref 3.5–5)
ALP SERPL-CCNC: 193 U/L (ref 34–104)
ALT SERPL W P-5'-P-CCNC: 8 U/L (ref 7–52)
ANION GAP SERPL CALCULATED.3IONS-SCNC: 5 MMOL/L (ref 4–13)
ANISOCYTOSIS BLD QL SMEAR: PRESENT
AST SERPL W P-5'-P-CCNC: 20 U/L (ref 13–39)
BASOPHILS # BLD MANUAL: 0 THOUSAND/UL (ref 0–0.1)
BASOPHILS NFR MAR MANUAL: 0 % (ref 0–1)
BILIRUB SERPL-MCNC: 0.31 MG/DL (ref 0.2–1)
BUN SERPL-MCNC: 23 MG/DL (ref 5–25)
CALCIUM SERPL-MCNC: 9.5 MG/DL (ref 8.4–10.2)
CHLORIDE SERPL-SCNC: 101 MMOL/L (ref 96–108)
CO2 SERPL-SCNC: 29 MMOL/L (ref 21–32)
CREAT SERPL-MCNC: 1.02 MG/DL (ref 0.6–1.3)
EOSINOPHIL # BLD MANUAL: 0 THOUSAND/UL (ref 0–0.4)
EOSINOPHIL NFR BLD MANUAL: 0 % (ref 0–6)
ERYTHROCYTE [DISTWIDTH] IN BLOOD BY AUTOMATED COUNT: 15.2 % (ref 11.6–15.1)
GFR SERPL CREATININE-BSD FRML MDRD: 56 ML/MIN/1.73SQ M
GIANT PLATELETS BLD QL SMEAR: PRESENT
GLUCOSE SERPL-MCNC: 88 MG/DL (ref 65–140)
HCT VFR BLD AUTO: 28.2 % (ref 34.8–46.1)
HGB BLD-MCNC: 9.3 G/DL (ref 11.5–15.4)
LG PLATELETS BLD QL SMEAR: PRESENT
LYMPHOCYTES # BLD AUTO: 1.53 THOUSAND/UL (ref 0.6–4.47)
LYMPHOCYTES # BLD AUTO: 5 % (ref 14–44)
MACROCYTES BLD QL AUTO: PRESENT
MAGNESIUM SERPL-MCNC: 2.1 MG/DL (ref 1.9–2.7)
MCH RBC QN AUTO: 32.3 PG (ref 26.8–34.3)
MCHC RBC AUTO-ENTMCNC: 33 G/DL (ref 31.4–37.4)
MCV RBC AUTO: 98 FL (ref 82–98)
MONOCYTES # BLD AUTO: 0.51 THOUSAND/UL (ref 0–1.22)
MONOCYTES NFR BLD: 2 % (ref 4–12)
NEUTROPHILS # BLD MANUAL: 23.52 THOUSAND/UL (ref 1.85–7.62)
NEUTS BAND NFR BLD MANUAL: 1 % (ref 0–8)
NEUTS SEG NFR BLD AUTO: 91 % (ref 43–75)
PLATELET # BLD AUTO: 230 THOUSANDS/UL (ref 149–390)
PLATELET BLD QL SMEAR: ADEQUATE
PMV BLD AUTO: 11.2 FL (ref 8.9–12.7)
POTASSIUM SERPL-SCNC: 4 MMOL/L (ref 3.5–5.3)
PROT SERPL-MCNC: 7.4 G/DL (ref 6.4–8.4)
RBC # BLD AUTO: 2.88 MILLION/UL (ref 3.81–5.12)
RBC MORPH BLD: PRESENT
SODIUM SERPL-SCNC: 135 MMOL/L (ref 135–147)
VARIANT LYMPHS # BLD AUTO: 1 %
WBC # BLD AUTO: 25.57 THOUSAND/UL (ref 4.31–10.16)

## 2025-05-21 PROCEDURE — 83735 ASSAY OF MAGNESIUM: CPT | Performed by: INTERNAL MEDICINE

## 2025-05-21 PROCEDURE — 85027 COMPLETE CBC AUTOMATED: CPT | Performed by: INTERNAL MEDICINE

## 2025-05-21 PROCEDURE — 74177 CT ABD & PELVIS W/CONTRAST: CPT

## 2025-05-21 PROCEDURE — 71260 CT THORAX DX C+: CPT

## 2025-05-21 PROCEDURE — 85007 BL SMEAR W/DIFF WBC COUNT: CPT | Performed by: INTERNAL MEDICINE

## 2025-05-21 PROCEDURE — 80053 COMPREHEN METABOLIC PANEL: CPT | Performed by: INTERNAL MEDICINE

## 2025-05-21 RX ADMIN — SODIUM CHLORIDE 1000 ML: 0.9 INJECTION, SOLUTION INTRAVENOUS at 12:35

## 2025-05-21 RX ADMIN — IOHEXOL 75 ML: 350 INJECTION, SOLUTION INTRAVENOUS at 10:52

## 2025-05-21 NOTE — PROGRESS NOTES
Patient presents for hydration and labs today and is without complaints at this time. Labs drawn per protocol. Patient tolerated infusion without incident. Declines AVS. Aware of next appointment on 5/21 @ 1230pm.

## 2025-05-21 NOTE — TELEPHONE ENCOUNTER
Rec'd call from patient asking for a refill of her gabapentin and ondansetron. I advised her that we will send this into her pharmacy today. Patient verbalized understanding.

## 2025-05-21 NOTE — PLAN OF CARE
Problem: Potential for Falls  Goal: Patient will remain free of falls  Description: INTERVENTIONS:  - Educate patient/family on patient safety including physical limitations  - Instruct patient to call for assistance with activity   - Consider consulting OT/PT to assist with strengthening/mobility based on AM PAC & JH-HLM score  - Consult OT/PT to assist with strengthening/mobility   - Keep Call bell within reach  - Keep bed low and locked with side rails adjusted as appropriate  - Keep care items and personal belongings within reach  - Initiate and maintain comfort rounds  - Make Fall Risk Sign visible to staff  - Apply yellow socks and bracelet for high fall risk patients  - Consider moving patient to room near nurses station  Outcome: Progressing

## 2025-05-22 ENCOUNTER — TELEPHONE (OUTPATIENT)
Dept: SPEECH THERAPY | Facility: CLINIC | Age: 70
End: 2025-05-22

## 2025-05-22 RX ORDER — GABAPENTIN 300 MG/1
CAPSULE ORAL
Qty: 120 CAPSULE | Refills: 0 | Status: SHIPPED | OUTPATIENT
Start: 2025-05-22

## 2025-05-22 RX ORDER — ONDANSETRON 8 MG/1
8 TABLET, FILM COATED ORAL EVERY 8 HOURS PRN
Qty: 90 TABLET | Refills: 2 | Status: SHIPPED | OUTPATIENT
Start: 2025-05-22

## 2025-05-22 NOTE — TELEPHONE ENCOUNTER
Patient did not show for her 11:00am outpatient Speech Therapy appointment. Clinician attempted to contact patient via telephone. Clinician left a message on patient's voicemail requesting a call back to reschedule today's appointment.

## 2025-05-23 ENCOUNTER — TELEPHONE (OUTPATIENT)
Dept: HEMATOLOGY ONCOLOGY | Facility: CLINIC | Age: 70
End: 2025-05-23

## 2025-05-27 ENCOUNTER — TELEPHONE (OUTPATIENT)
Dept: HEMATOLOGY ONCOLOGY | Facility: CLINIC | Age: 70
End: 2025-05-27

## 2025-05-27 ENCOUNTER — APPOINTMENT (OUTPATIENT)
Dept: SPEECH THERAPY | Facility: CLINIC | Age: 70
End: 2025-05-27
Attending: INTERNAL MEDICINE
Payer: MEDICARE

## 2025-05-27 ENCOUNTER — OFFICE VISIT (OUTPATIENT)
Dept: HEMATOLOGY ONCOLOGY | Facility: CLINIC | Age: 70
End: 2025-05-27
Payer: MEDICARE

## 2025-05-27 VITALS
RESPIRATION RATE: 16 BRPM | SYSTOLIC BLOOD PRESSURE: 122 MMHG | BODY MASS INDEX: 26.62 KG/M2 | TEMPERATURE: 97.6 F | DIASTOLIC BLOOD PRESSURE: 78 MMHG | OXYGEN SATURATION: 99 % | HEIGHT: 65 IN | WEIGHT: 159.8 LBS | HEART RATE: 94 BPM

## 2025-05-27 DIAGNOSIS — C18.9 METASTATIC COLON CANCER TO LIVER (HCC): Primary | ICD-10-CM

## 2025-05-27 DIAGNOSIS — C09.9 RIGHT TONSILLAR SQUAMOUS CELL CARCINOMA (HCC): ICD-10-CM

## 2025-05-27 DIAGNOSIS — C78.7 METASTATIC COLON CANCER TO LIVER (HCC): Primary | ICD-10-CM

## 2025-05-27 DIAGNOSIS — Z90.89 STATUS POST TONSILLECTOMY: ICD-10-CM

## 2025-05-27 PROCEDURE — 99215 OFFICE O/P EST HI 40 MIN: CPT | Performed by: INTERNAL MEDICINE

## 2025-05-27 PROCEDURE — G2211 COMPLEX E/M VISIT ADD ON: HCPCS | Performed by: INTERNAL MEDICINE

## 2025-05-27 RX ORDER — SODIUM CHLORIDE 9 MG/ML
20 INJECTION, SOLUTION INTRAVENOUS ONCE
OUTPATIENT
Start: 2025-06-11

## 2025-05-27 NOTE — ASSESSMENT & PLAN NOTE
Patient is a 69-year-old postmenopausal female, with a complex Oncologic history, including synchronous de naveed metastatic adenocarcinoma of the mid-ascending colon (Complicated by partial-obstruction requiring right hemicolectomy on March 15th, 2024) with biopsy-proven oligometastatic disease to the liver (Status-post cryoablation on June 3rd, 2024) with continued disease through multiple-lines of therapy; who presents today for interval follow-up. On evaluation this morning, patient is clinically well. Most recent laboratory-studies show growth-factor related leukocytosis (WBC: 55.15), stable macrocytic anemia (Hemoglobin: 9.0 MCV: 100), mild hyponatremia (Sodium: 133), and stable elevation of Alkaline Phosphatase (AST: 24 ALT: 9 ALP: 188). Repeat CT Chest, Abdomen, and Pelvis demonstrates interval progression of metastatic disease, including new and enlarging hepatic-metastases, as well as new small pulmonary nodules. Interval enlargement of the cervical mass (e.g. Drop metastasis versus primary malignancy).     We discussed continued progression of disease, in great detail; as well as options, including subsequent lines of therapy, versus discontinuation of cancer-directed treatment and transition to more symptom-focused care. Patient expressed interest in pursuing further cancer-directed treatment. In the absence of any identifiable actionable mutations on prior next-generation sequencing, would recommend initiation of Trifluridine and Tipiracil (Lonsurf) 35 mg/m2 BID every other week (Days 1 to 5, and Days 8 to 12), of a 28-day cycle, and Bevacizumab 5 mg/kg every 2-weeks. Outlined potential adverse-effects in great detail, including but not limited to: Generalized fatigue, gastrointestinal-toxicities (e.g. Anorexia, abdominal-discomfort, nausea, vomiting, diarrhea, perforation or fistula), myelosuppression, hemorrhage, hypertension, thrombotic-events, wound-healing impairment, and infusion reactions. Provided  patient informational-packets for her review. Consent was obtained, on completion of our encounter. Will plan to start treatment in the next 1-2-weeks. In the interim, will obtain a CT Neck to reassess disease-status of squamous cell carcinoma. Will also obtain Caris Assure Blood-Based Profile testing, to evaluate for actionable-mutations. Plan for outpatient follow-up in approximately 4-weeks. Patient expressed understanding and agreement with the outlined plan.     Trifluridine and Tipiracil plus Bevacizumab per NCCN Guidelines:  Administered on a 28-day cycle, until disease progression or toxicity.  Trifluridine and Tipiracil 35 mg/m2 PO BID on Days 1-5, and 8-12.  Note: Patient will be prescribed 60 mg BID.  Bevacizumab 5 mg/kg IV on Days 1 and 15.    Summary of Recommendations:  Obtain CT Neck to assess disease-status of SCC.  Send Caris Assure Blood-Based Profile, to assess for actionable mutations.  Discontinued active treatment-plan with FOLFIRI, due to progression of disease.  Initiate Trifluridine and Tipiracil (Lonsurf), plus Bevacizumab, as outlined above:  Plan to start Cycle 1 Day 1 on receipt of the oral-medication.  Will plan to start Bevacizumab in the next 1-2 weeks, as well.  Recommend close interval follow-up in approximately 4-weeks.

## 2025-05-27 NOTE — TELEPHONE ENCOUNTER
Left detailed VM for patient asking if she would still like to have her fluids 2 times a week. I asked that she call me back directly to discuss further.

## 2025-05-27 NOTE — TELEPHONE ENCOUNTER
1 [id number] ** 02/27/2025 02/27/2025 oxyCODONE HCL (Solution) 473.0 24 5 MG/5 ML 29.56 [facility] Commercial Insurance 0 / 0 [place]   1 [id number] ** 12/16/2024 12/16/2024 oxyCODONE HCL (Solution) 473.0 24 5 MG/5 ML 29.56 [facility] Commercial Insurance 0 / 0 PA   1 [id number] ** 10/21/2024 10/21/2024 oxyCODONE HCL (Solution) 473.0 23 5 MG/5 ML 30.85 [facility] Commercial Insurance 0 / 0 PA   1 [id number] ** 10/09/2024 10/09/2024 oxyCODONE HCL (Solution) 100.0 5 5 MG/5 ML 30.0 [facility] Commercial [facility] 0 / 0 [place]

## 2025-05-27 NOTE — PROGRESS NOTES
Medical Oncology: Outpatient Progress Note:       Name: Maira Moura      : 1955      MRN: [id number]                 Encounter Provider: Harika Medina DO  Encounter Date: 2025   Encounter department: [facility] [place]  Assessment & Plan  Metastatic colon cancer to liver (HCC)  Patient is a 69-year-old postmenopausal female, with a complex Oncologic history, including synchronous de naveed metastatic adenocarcinoma of the mid-ascending colon (Complicated by partial-obstruction requiring right hemicolectomy on 2024) with biopsy-proven oligometastatic disease to the liver (Status-post cryoablation on 2024) with continued disease through multiple-lines of therapy; who presents today for interval follow-up. On evaluation this morning, patient is clinically well. Most recent laboratory-studies show growth-factor related leukocytosis (WBC: 55.15), stable macrocytic anemia (Hemoglobin: 9.0 MCV: 100), mild hyponatremia (Sodium: 133), and stable elevation of Alkaline Phosphatase (AST: 24 ALT: 9 ALP: 188). Repeat CT Chest, Abdomen, and Pelvis demonstrates interval progression of metastatic disease, including new and enlarging hepatic-metastases, as well as new small pulmonary nodules. Interval enlargement of the cervical mass (e.g. Drop metastasis versus primary malignancy).     We discussed continued progression of disease, in great detail; as well as options, including subsequent lines of therapy, versus discontinuation of cancer-directed treatment and transition to more symptom-focused care. Patient expressed interest in pursuing further cancer-directed treatment. In the absence of any identifiable actionable mutations on prior next-generation sequencing, would recommend initiation of Trifluridine and Tipiracil (Lonsurf) 35 mg/m2 BID every other week (Days 1 to 5, and Days 8 to 12), of a 28-day cycle, and Bevacizumab 5 mg/kg every 2-weeks.  Outlined potential adverse-effects in great detail, including but not limited to: Generalized fatigue, gastrointestinal-toxicities (e.g. Anorexia, abdominal-discomfort, nausea, vomiting, diarrhea, perforation or fistula), myelosuppression, hemorrhage, hypertension, thrombotic-events, wound-healing impairment, and infusion reactions. Provided patient informational-packets for her review. Consent was obtained, on completion of our encounter. Will plan to start treatment in the next 1-2-weeks. In the interim, will obtain a CT Neck to reassess disease-status of squamous cell carcinoma. Will also obtain Caris Assure Blood-Based Profile testing, to evaluate for actionable-mutations. Plan for outpatient follow-up in approximately 4-weeks. Patient expressed understanding and agreement with the outlined plan.     Trifluridine and Tipiracil plus Bevacizumab per NCCN Guidelines:  Administered on a 28-day cycle, until disease progression or toxicity.  Trifluridine and Tipiracil 35 mg/m2 PO BID on Days 1-5, and 8-12.  Note: Patient will be prescribed 60 mg BID.  Bevacizumab 5 mg/kg IV on Days 1 and 15.    Summary of Recommendations:  Obtain CT Neck to assess disease-status of SCC.  Send Caris Assure Blood-Based Profile, to assess for actionable mutations.  Discontinued active treatment-plan with FOLFIRI, due to progression of disease.  Initiate Trifluridine and Tipiracil (Lonsurf), plus Bevacizumab, as outlined above:  Plan to start Cycle 1 Day 1 on receipt of the oral-medication.  Will plan to start Bevacizumab in the next 1-2 weeks, as well.  Recommend close interval follow-up in approximately 4-weeks.             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 On completion of  definitive chemoradiation, repeat PET-CT was obtained on December 27th, 2024; and demonstrated interval near-complete metabolic response to therapy. Will re-evaluate disease status via CT Neck, given need for change in therapy for metastatic colon cancer. Follow-up, as above.     Summary of Recommendations:  Obtain CT Neck, now.  Continue active-surveillance:  Follow-up with Radiation Oncology, and Otolaryngology, as scheduled.  Continue surveillance-nasolaryngoscopy per Otolaryngology.  Recommend close interval follow-up in approximately 4-weeks (See above).       History of Present Illness   Chief Complaint: Follow-up.  Interval Events: Maira Moura is a 69-year-old postmenopausal female, with a complex Oncologic history, including synchronous de naveed metastatic adenocarcinoma of the mid-ascending colon (Complicated by partial-obstruction requiring right hemicolectomy on March 15th, 2024) with biopsy-proven oligometastatic disease to the liver (Status-post cryoablation on June 3rd, 2024) with continued disease through multiple-lines of therapy; who presents today for interval follow-up. On evaluation, patient is clinically well. She endorses improved tolerance of oral-intake compared to previous, preferentially including soft food-products. She can tolerate oral-medications, as well. No dysphagia, or odynophagia noted. Patient is still supplementing her nutrition with tube-feeds, although has reduced to 1-carton/day (e.g. From 2-3 daily). Weight remains stable in the 159-161-pound range. She continues to receive intravenous-fluids twice-weekly, as well. Energy levels are improving. Peripheral neuropathy is stable. Discussed results of recent imaging. Will discontinue current therapy with FOLFIRI, and reinitiate new treatment (See above). Addressed all questions, and concerns in great detail. Patient is in agreement with the plan.    Oncology History   Cancer Staging   Malignant neoplasm of right female breast  (HCC)  Staging form: Breast, AJCC 8th Edition  - Pathologic: Stage IA (pT2, pN0, cM0, G2, ER: Positive, HI: Positive, HER2: Negative) - Signed by Rosalva Drake MD on 1/11/2019  Histologic grading system: 3 grade system    Metastatic colon cancer to liver (HCC)  Staging form: Colon and Rectum, AJCC 8th Edition  - Clinical stage from 7/13/2023: cT3, cN0, pM1 - Signed by Harika Medina DO on 9/28/2023  Total positive nodes: 0  - Pathologic: pT3, pN0, cM1 - Signed by Vickie Israel MD on 4/3/2024  Total positive nodes: 0    Right tonsillar squamous cell carcinoma (HCC)  Staging form: Pharynx - Oropharynx, AJCC 8th Edition  - Clinical stage from 7/27/2023: Stage III (cT1, cN3, cM0, p16+) - Signed by Evan Plummer MD on 7/27/2023  Stage prefix: Initial diagnosis  Oncology History   Malignant neoplasm of right female breast (HCC)   11/23/2018 Initial Diagnosis    Malignant neoplasm of right female breast (HCC)     11/23/2018 Biopsy    Rt Breast US BX 1100 8cmfn, 4 passes 12g Marquee:  - Invasive breast carcinoma of no special type (ductal NST/invasive ductal carcinoma).  - grade 2  - %  - HI 95%  - HER2 negative     12/20/2018 Surgery    Right partial mastectomy and SLN biopsy:  Infiltrating ductal carcinoma, Grade 2/3, felt to be between 2.0 cm and 2.5 cm in greatest dimension  - No invasive tumor is seen at inked margins, but there is a focus of DCIS seen within approximately 1mm of the inked medial margin  - no LVI  - no LCIS  - 0/2 LN's  at least Stage IIA - pT2, pN0, cM0, G2.    - Dr Coronado     12/20/2018 -  Cancer Staged    Stage IIA - pT2, pN0, cM0, G2.       1/4/2019 Genomic Testing    Oncotype DX score: 13     2/1/2019 -  Hormone Therapy    anastrozole 1 mg daily as adjuvant endocrine therapy    - Dr Daley       2/14/2019 - 4/3/2019 Radiation    Course: C1    Plan ID Energy Fractions Dose per Fraction (cGy) Dose Correction (cGy) Total Dose Delivered (cGy) Elapsed Days   R Breast 10X/6X 25 /  "25 200 0 5,000 40   R BRST BOOST 16E 5 / 5 200 0 1,000 7      Treatment dates:  C1: 2/14/2019 - 4/3/2019       Metastatic colon cancer to liver (HCC)   7/6/2023 Genetic Testing    CARIS Results:   KRAS Pathogenic Variant Exon 2  p.G12D : lack of benefit of cetuximab, panitumumab Level 2   No other actionable mutation; MSI stable; TMB low   MiFOLOXAi Results: \"Decreased Benefit of FOLFOX + Bevacizumab in first line metastatic CRC.  This patient may achieve improved results by receiving an alternative to FOLFOX, such as FOLFIRI, as their initial regimen. As an adjustment to frontline FOLFOXIRI following toxicity: This patient may achieve improved results by removing the oxaliplatin portion of their regimen\".         7/11/2023 Initial Diagnosis    Metastatic colon cancer to liver (HCC)     7/13/2023 -  Cancer Staged    Staging form: Colon and Rectum, AJCC 8th Edition  - Clinical stage from 7/13/2023: cT3, cN0, pM1 - Signed by Harika Medina DO on 9/28/2023  Total positive nodes: 0       7/31/2023 - 8/1/2024 Chemotherapy    cyanocobalamin, 1,000 mcg, Intramuscular, Every 30 days, 7 of 9 cycles  Administration: 1,000 mcg (5/9/2024), 1,000 mcg (5/24/2024), 1,000 mcg (6/20/2024), 1,000 mcg (7/3/2024), 1,000 mcg (7/18/2024), 1,000 mcg (8/1/2024)  potassium chloride, 20 mEq, Intravenous, Once, 2 of 2 cycles  Administration: 20 mEq (11/6/2023), 20 mEq (11/20/2023)  alteplase (CATHFLO), 2 mg, Intracatheter, Every 1 Minute as needed, 21 of 23 cycles  Administration: 2 mg (1/3/2024)  pegfilgrastim (NEULASTA), 6 mg, Subcutaneous, Once, 12 of 12 cycles  Administration: 6 mg (10/25/2023), 6 mg (11/8/2023), 6 mg (11/22/2023), 6 mg (12/6/2023), 6 mg (12/20/2023), 6 mg (1/12/2024), 6 mg (1/25/2024), 6 mg (4/25/2024), 6 mg (5/9/2024), 6 mg (5/24/2024), 6 mg (6/20/2024)  fluorouracil (ADRUCIL), 895 mg, Intravenous, Once, 21 of 23 cycles  Dose modification: 320 mg/m2 (original dose 400 mg/m2, Cycle 11)  Administration: 900 mg " (7/31/2023), 900 mg (8/14/2023), 900 mg (8/28/2023), 900 mg (9/11/2023), 900 mg (9/25/2023), 900 mg (10/9/2023), 900 mg (10/23/2023), 895 mg (11/6/2023), 895 mg (11/20/2023), 895 mg (12/4/2023), 700 mg (12/18/2023), 680 mg (1/10/2024), 680 mg (1/23/2024), 625 mg (4/23/2024), 625 mg (5/7/2024), 625 mg (5/22/2024), 625 mg (6/4/2024), 625 mg (6/18/2024), 625 mg (7/1/2024), 625 mg (7/16/2024), 625 mg (7/30/2024)  nivolumab (OPDIVO) IVPB, 240 mg (100 % of original dose 240 mg), Intravenous, Once, 13 of 15 cycles  Dose modification: 240 mg (original dose 240 mg, Cycle 9)  Administration: 240 mg (11/20/2023), 240 mg (12/4/2023), 240 mg (12/18/2023), 240 mg (1/10/2024), 240 mg (1/23/2024), 240 mg (4/23/2024), 240 mg (5/7/2024), 240 mg (5/22/2024), 240 mg (6/4/2024), 240 mg (6/18/2024), 240 mg (7/1/2024), 240 mg (7/16/2024), 240 mg (7/30/2024)  leucovorin calcium IVPB, 896 mg, Intravenous, Once, 21 of 23 cycles  Administration: 900 mg (7/31/2023), 900 mg (8/14/2023), 900 mg (8/28/2023), 900 mg (9/11/2023), 900 mg (9/25/2023), 900 mg (10/9/2023), 900 mg (10/23/2023), 900 mg (11/6/2023), 900 mg (11/20/2023), 900 mg (12/4/2023), 900 mg (12/18/2023), 850 mg (1/10/2024), 850 mg (1/23/2024), 800 mg (4/23/2024), 800 mg (5/7/2024), 800 mg (5/22/2024), 800 mg (6/4/2024), 800 mg (6/18/2024), 800 mg (7/1/2024), 800 mg (7/16/2024), 800 mg (7/30/2024)  oxaliplatin (ELOXATIN) chemo infusion, 85 mg/m2 = 190.4 mg, Intravenous, Once, 12 of 12 cycles  Dose modification: 68 mg/m2 (original dose 85 mg/m2, Cycle 11, Reason: Other (Must fill in a comment), Comment: increased fatigue)  Administration: 190.4 mg (7/31/2023), 190.4 mg (8/14/2023), 200 mg (8/28/2023), 200 mg (9/11/2023), 200 mg (9/25/2023), 200 mg (10/9/2023), 200 mg (10/23/2023), 200 mg (11/6/2023), 200 mg (11/20/2023), 190.4 mg (12/4/2023), 150 mg (12/18/2023), 150 mg (1/10/2024)  fluorouracil (ADRUCIL) ambulatory infusion Soln, 1,200 mg/m2/day = 5,375 mg, Intravenous, Over 46  hours, 21 of 23 cycles     9/26/2023 -  Cancer Staged    Staging form: Colon and Rectum, AJCC 8th Edition  - Pathologic: pT3, pN0, cM1 - Signed by Vickie Israel MD on 4/3/2024  Total positive nodes: 0       3/12/2024 Surgery    A. Terminal ileum, appendix, right colon (right hemicolectomy):  - Adenocarcinoma (5.2cm)  - Forty-nine (49) lymph nodes negative for carcinoma (0/49)    - Acellular mucin present in one (1) lymph node   - See staging synoptic (ypT3N0)     1/8/2025 -  Chemotherapy    fluorouracil (ADRUCIL), 400 mg/m2 = 770 mg, 2 of 2 cycles  Administration: 770 mg (1/8/2025), 770 mg (1/22/2025)  irinotecan (CAMPTOSAR) chemo infusion, 173 mg (50 % of original dose 180 mg/m2), 8 of 11 cycles  Dose modification: 90 mg/m2 (original dose 180 mg/m2, Cycle 1, Reason: Anticipated Tolerance, Comment: 50% dose reduction due to pre-existing diarrhea)  Administration: 180 mg (1/8/2025), 180 mg (1/22/2025), 160 mg (3/5/2025), 160 mg (3/19/2025), 160 mg (4/2/2025), 160 mg (4/16/2025), 160 mg (4/30/2025), 160 mg (5/14/2025)  leucovorin calcium IVPB, 768 mg, 2 of 2 cycles  Administration: 800 mg (1/8/2025), 800 mg (1/22/2025)  fluorouracil (ADRUCIL) ambulatory infusion Soln, 1,200 mg/m2/day = 4,610 mg, 8 of 11 cycles     Right tonsillar squamous cell carcinoma (HCC)   7/27/2023 Initial Diagnosis    Right tonsillar squamous cell carcinoma (HCC)     7/27/2023 -  Cancer Staged    Staging form: Pharynx - Oropharynx, AJCC 8th Edition  - Clinical stage from 7/27/2023: Stage III (cT1, cN3, cM0, p16+) - Signed by Evan Plummer MD on 7/27/2023  Stage prefix: Initial diagnosis       9/16/2024 - 11/13/2024 Radiation      Plan ID Energy Fractions Dose per Fraction (cGy) Dose Correction (cGy) Total Dose Delivered (cGy) Elapsed Days   Head_Neck 6X-FFF 35 / 35 200 0 7,000 58    C2: 9/16/2024 - 11/13/2024 9/17/2024 - 10/21/2024 Chemotherapy    alteplase (CATHFLO), 2 mg, Intracatheter, Every 1 Minute as needed, 6 of 6  cycles  Administration: 2 mg (10/21/2024)  CISplatin (PLATINOL) infusion, 70 mg (original dose 40 mg/m2), Intravenous, Once, 6 of 6 cycles  Dose modification: 70 mg (original dose 40 mg/m2, Cycle 1, Reason: Anticipated Tolerance), 30 mg/m2 (original dose 40 mg/m2, Cycle 4, Reason: Neuropathy, Comment: dose reduction to 30 mg/m2 due to neuropathy)  Administration: 70 mg (9/17/2024), 70 mg (9/23/2024), 70 mg (9/30/2024), 59.1 mg (10/7/2024), 59.1 mg (10/14/2024), 59.1 mg (10/21/2024)  sodium chloride, 500 mL, Intravenous, Once, 6 of 6 cycles  Administration: 500 mL (9/17/2024), 500 mL (9/17/2024), 500 mL (9/23/2024), 500 mL (9/23/2024), 500 mL (9/30/2024), 500 mL (9/30/2024), 500 mL (10/7/2024), 500 mL (10/7/2024), 500 mL (10/14/2024), 500 mL (10/14/2024), 500 mL (10/21/2024)  fosaprepitant (EMEND) IVPB, 150 mg, Intravenous, Once, 6 of 6 cycles  Administration: 150 mg (9/17/2024), 150 mg (9/23/2024), 150 mg (9/30/2024), 150 mg (10/7/2024), 150 mg (10/14/2024), 150 mg (10/21/2024)  IVPB builder, , Intravenous, Once, 1 of 1 cycle  Administration: Unknown dose (10/21/2024)        Review of Systems  Review of Systems:  All systems reviewed and negative except otherwise listed in the History of Present Illness.      Objective   Vitals:    05/27/25 1038   BP: 122/78   Pulse: 94   Resp: 16   Temp: 97.6 °F (36.4 °C)   SpO2: 99%     ECOG   2-Points.    Physical Exam:  General: Alert, and oriented; Pleasant, and conversational; Well-Appearing Female  HEENT: Atraumatic, and normocephalic; PERRLA; EOMI; Moist mucosal membranes  Neck: Palpable Lymph Node in Right Anterior Cervical Chain (Unclear if New or Progressed from Previous)  Cardiovascular: Tachycardia Noted on Vital-Signs (Regular Rate and Rhythm on Auscultation); No murmurs, rubs, or gallops appreciated; No peripheral edema  Respiratory: Clear to auscultation bilaterally; Adequate aeration; No oxygen requirement  Abdomen: Soft, non-tender; Non-distended; No organomegaly;  Bowel sounds present  Extremities: No obvious deformities; No peripheral edema; Moves all  Neurologic: Appropriately alert, and oriented to Person, Place, and Time; No focal neurologic deficits    Labs: I have reviewed the following labs:  Lab Results   Component Value Date/Time    WBC 25.57 (H) 05/21/2025 12:35 PM    RBC 2.88 (L) 05/21/2025 12:35 PM    Hemoglobin 9.3 (L) 05/21/2025 12:35 PM    Hematocrit 28.2 (L) 05/21/2025 12:35 PM    MCV 98 05/21/2025 12:35 PM    MCH 32.3 05/21/2025 12:35 PM    RDW 15.2 (H) 05/21/2025 12:35 PM    Platelets 230 05/21/2025 12:35 PM    Segmented % 74 05/12/2025 01:08 PM    Lymphocytes % 5 (L) 05/21/2025 12:35 PM    Lymphocytes % 10 (L) 05/12/2025 01:08 PM    Monocytes % 2 (L) 05/21/2025 12:35 PM    Monocytes % 11 05/12/2025 01:08 PM    Eosinophils % 0 05/21/2025 12:35 PM    Eosinophils Relative 3 05/12/2025 01:08 PM    Basophils % 0 05/21/2025 12:35 PM    Basophils Relative 1 05/12/2025 01:08 PM    Immature Grans % 1 05/12/2025 01:08 PM    Absolute Neutrophils 7.93 (H) 05/12/2025 01:08 PM     Lab Results   Component Value Date/Time    Potassium 4.0 05/21/2025 12:35 PM    Chloride 101 05/21/2025 12:35 PM    CO2 29 05/21/2025 12:35 PM    BUN 23 05/21/2025 12:35 PM    Creatinine 1.02 05/21/2025 12:35 PM    Glucose, Fasting 102 (H) 10/30/2024 04:34 AM    Calcium 9.5 05/21/2025 12:35 PM    Corrected Calcium 9.9 05/05/2025 12:44 PM    AST 20 05/21/2025 12:35 PM    ALT 8 05/21/2025 12:35 PM    Alkaline Phosphatase 193 (H) 05/21/2025 12:35 PM    Total Protein 7.4 05/21/2025 12:35 PM    Albumin 3.6 05/21/2025 12:35 PM    Total Bilirubin 0.31 05/21/2025 12:35 PM    eGFR 56 05/21/2025 12:35 PM     Patient was seen and discussed with attending physician, ALAINA Steve D.O.  Hematology-Oncology Fellow (PGY-5)

## 2025-05-27 NOTE — ASSESSMENT & PLAN NOTE
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 On completion of definitive chemoradiation, repeat PET-CT was obtained on December 27th, 2024; and demonstrated interval near-complete metabolic response to therapy. Will re-evaluate disease status via CT Neck, given need for change in therapy for metastatic colon cancer. Follow-up, as above.     Summary of Recommendations:  Obtain CT Neck, now.  Continue active-surveillance:  Follow-up with Radiation Oncology, and Otolaryngology, as scheduled.  Continue surveillance-nasolaryngoscopy per Otolaryngology.  Recommend close interval follow-up in approximately 4-weeks (See above).

## 2025-05-27 NOTE — TELEPHONE ENCOUNTER
Reason for call:   [x] Refill   [] Prior Auth  [] Other:     Office:   [] PCP/Provider -   [x] Specialty/Provider - Hem/onc, Agostino    Medication:   Oxycodone 5mg/5ml, 5 ml q6 prn,  473 ml    Pharmacy:    Pharm [facility]   Does the patient have enough for 3 days?   [x] Yes   [] No - Send as HP to POD    Mail Away Pharmacy   Does the patient have enough for 10 days?   [] Yes   [] No - Send as HP to POD

## 2025-05-28 ENCOUNTER — NUTRITION (OUTPATIENT)
Dept: NUTRITION | Facility: CLINIC | Age: 70
End: 2025-05-28

## 2025-05-28 ENCOUNTER — HOSPITAL ENCOUNTER (OUTPATIENT)
Dept: INFUSION CENTER | Facility: CLINIC | Age: 70
Discharge: HOME | End: 2025-05-28
Attending: INTERNAL MEDICINE
Payer: MEDICARE

## 2025-05-28 VITALS
RESPIRATION RATE: 18 BRPM | HEART RATE: 69 BPM | TEMPERATURE: 97.6 F | SYSTOLIC BLOOD PRESSURE: 112 MMHG | DIASTOLIC BLOOD PRESSURE: 77 MMHG

## 2025-05-28 DIAGNOSIS — C18.9 METASTATIC COLON CANCER TO LIVER (HCC): Primary | ICD-10-CM

## 2025-05-28 DIAGNOSIS — C78.7 METASTATIC COLON CANCER TO LIVER (HCC): Primary | ICD-10-CM

## 2025-05-28 DIAGNOSIS — Z71.3 NUTRITIONAL COUNSELING: Primary | ICD-10-CM

## 2025-05-28 DIAGNOSIS — E86.0 DEHYDRATION: Primary | ICD-10-CM

## 2025-05-28 LAB
ALBUMIN SERPL BCG-MCNC: 3.7 G/DL (ref 3.5–5)
ALP SERPL-CCNC: 146 U/L (ref 34–104)
ALT SERPL W P-5'-P-CCNC: 9 U/L (ref 7–52)
ANION GAP SERPL CALCULATED.3IONS-SCNC: 3 MMOL/L (ref 4–13)
AST SERPL W P-5'-P-CCNC: 26 U/L (ref 13–39)
BASOPHILS # BLD AUTO: 0.08 THOUSANDS/ÂΜL (ref 0–0.1)
BASOPHILS NFR BLD AUTO: 1 % (ref 0–1)
BILIRUB SERPL-MCNC: 0.24 MG/DL (ref 0.2–1)
BUN SERPL-MCNC: 28 MG/DL (ref 5–25)
CALCIUM SERPL-MCNC: 9.8 MG/DL (ref 8.4–10.2)
CHLORIDE SERPL-SCNC: 99 MMOL/L (ref 96–108)
CO2 SERPL-SCNC: 32 MMOL/L (ref 21–32)
CREAT SERPL-MCNC: 1.02 MG/DL (ref 0.6–1.3)
EOSINOPHIL # BLD AUTO: 0.25 THOUSAND/ÂΜL (ref 0–0.61)
EOSINOPHIL NFR BLD AUTO: 2 % (ref 0–6)
ERYTHROCYTE [DISTWIDTH] IN BLOOD BY AUTOMATED COUNT: 14.9 % (ref 11.6–15.1)
GFR SERPL CREATININE-BSD FRML MDRD: 56 ML/MIN/1.73SQ M
GLUCOSE SERPL-MCNC: 97 MG/DL (ref 65–140)
HCT VFR BLD AUTO: 29.7 % (ref 34.8–46.1)
HGB BLD-MCNC: 9.7 G/DL (ref 11.5–15.4)
IMM GRANULOCYTES # BLD AUTO: 0.04 THOUSAND/UL (ref 0–0.2)
IMM GRANULOCYTES NFR BLD AUTO: 0 % (ref 0–2)
LYMPHOCYTES # BLD AUTO: 0.76 THOUSANDS/ÂΜL (ref 0.6–4.47)
LYMPHOCYTES NFR BLD AUTO: 7 % (ref 14–44)
MAGNESIUM SERPL-MCNC: 2.1 MG/DL (ref 1.9–2.7)
MCH RBC QN AUTO: 32.4 PG (ref 26.8–34.3)
MCHC RBC AUTO-ENTMCNC: 32.7 G/DL (ref 31.4–37.4)
MCV RBC AUTO: 99 FL (ref 82–98)
MONOCYTES # BLD AUTO: 0.9 THOUSAND/ÂΜL (ref 0.17–1.22)
MONOCYTES NFR BLD AUTO: 8 % (ref 4–12)
NEUTROPHILS # BLD AUTO: 8.84 THOUSANDS/ÂΜL (ref 1.85–7.62)
NEUTS SEG NFR BLD AUTO: 82 % (ref 43–75)
NRBC BLD AUTO-RTO: 0 /100 WBCS
PLATELET # BLD AUTO: 272 THOUSANDS/UL (ref 149–390)
PMV BLD AUTO: 11.1 FL (ref 8.9–12.7)
POTASSIUM SERPL-SCNC: 4.2 MMOL/L (ref 3.5–5.3)
PROT SERPL-MCNC: 7.6 G/DL (ref 6.4–8.4)
RBC # BLD AUTO: 2.99 MILLION/UL (ref 3.81–5.12)
SODIUM SERPL-SCNC: 134 MMOL/L (ref 135–147)
WBC # BLD AUTO: 10.87 THOUSAND/UL (ref 4.31–10.16)

## 2025-05-28 PROCEDURE — 83735 ASSAY OF MAGNESIUM: CPT | Performed by: INTERNAL MEDICINE

## 2025-05-28 PROCEDURE — 80053 COMPREHEN METABOLIC PANEL: CPT | Performed by: INTERNAL MEDICINE

## 2025-05-28 PROCEDURE — 85025 COMPLETE CBC W/AUTO DIFF WBC: CPT | Performed by: INTERNAL MEDICINE

## 2025-05-28 PROCEDURE — 96360 HYDRATION IV INFUSION INIT: CPT

## 2025-05-28 RX ORDER — OXYCODONE HCL 5 MG/5 ML
5 SOLUTION, ORAL ORAL EVERY 6 HOURS PRN
Qty: 473 ML | Refills: 0 | Status: SHIPPED | OUTPATIENT
Start: 2025-05-28

## 2025-05-28 RX ADMIN — SODIUM CHLORIDE 1000 ML: 0.9 INJECTION, SOLUTION INTRAVENOUS at 12:25

## 2025-05-28 NOTE — PROGRESS NOTES
Maira Moura  tolerated treatment well with no complications.      Maira Moura is aware of future appt on 6/2/25 at 1330.     AVS printed and given to Maira Moura:

## 2025-05-28 NOTE — PROGRESS NOTES
Outpatient Oncology Nutrition Consultation   Type of Consult: Follow Up   Care Location: [facility]    Reason for referral: Received notification by [facility] PEPE ZAPATA on 8/21/24 that pt has triggered for oncology nutrition care (reason for referral: HNC dx and Malnutrition Screening Tool (MST) Triggers: scored a 0 indicating No recent wt loss and is not eating poorly due to a decreased appetite. 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 Will be changing tx to Avastin starting 6/11/25  Oncology History   Malignant neoplasm of right female breast (HCC)   11/23/2018 Initial Diagnosis    Malignant neoplasm of right female breast (HCC)     11/23/2018 Biopsy    Rt Breast US BX 1100 8cmfn, 4 passes 12g Marquee:  - Invasive breast carcinoma of no special type (ductal NST/invasive ductal carcinoma).  - grade 2  - %  - OK 95%  - HER2 negative     12/20/2018 Surgery    Right partial mastectomy and SLN biopsy:  Infiltrating ductal carcinoma, Grade 2/3, felt to be between 2.0 cm and 2.5 cm in greatest dimension  - No invasive tumor is seen at inked margins, but there is a focus of DCIS seen within approximately 1mm of the inked medial margin  - no LVI  - no LCIS  - 0/2 LN's  at least Stage IIA - pT2, pN0, cM0, G2.    - Dr Coronado     12/20/2018 -  Cancer Staged    Stage IIA -  "pT2, pN0, cM0, G2.       1/4/2019 Genomic Testing    Oncotype DX score: 13     2/1/2019 -  Hormone Therapy    anastrozole 1 mg daily as adjuvant endocrine therapy    - Dr Daley       2/14/2019 - 4/3/2019 Radiation    Course: C1    Plan ID Energy Fractions Dose per Fraction (cGy) Dose Correction (cGy) Total Dose Delivered (cGy) Elapsed Days   R Breast 10X/6X 25 / 25 200 0 5,000 40   R BRST BOOST 16E 5 / 5 200 0 1,000 7      Treatment dates:  C1: 2/14/2019 - 4/3/2019       Metastatic colon cancer to liver (HCC)   7/6/2023 Genetic Testing    CARIS Results:   KRAS Pathogenic Variant Exon 2  p.G12D : lack of benefit of cetuximab, panitumumab Level 2   No other actionable mutation; MSI stable; TMB low   MiFOLOXAi Results: \"Decreased Benefit of FOLFOX + Bevacizumab in first line metastatic CRC.  This patient may achieve improved results by receiving an alternative to FOLFOX, such as FOLFIRI, as their initial regimen. As an adjustment to frontline FOLFOXIRI following toxicity: This patient may achieve improved results by removing the oxaliplatin portion of their regimen\".         7/11/2023 Initial Diagnosis    Metastatic colon cancer to liver (HCC)     7/13/2023 -  Cancer Staged    Staging form: Colon and Rectum, AJCC 8th Edition  - Clinical stage from 7/13/2023: cT3, cN0, pM1 - Signed by Harika Medina DO on 9/28/2023  Total positive nodes: 0       7/31/2023 - 8/1/2024 Chemotherapy    cyanocobalamin, 1,000 mcg, Intramuscular, Every 30 days, 7 of 9 cycles  Administration: 1,000 mcg (5/9/2024), 1,000 mcg (5/24/2024), 1,000 mcg (6/20/2024), 1,000 mcg (7/3/2024), 1,000 mcg (7/18/2024), 1,000 mcg (8/1/2024)  potassium chloride, 20 mEq, Intravenous, Once, 2 of 2 cycles  Administration: 20 mEq (11/6/2023), 20 mEq (11/20/2023)  alteplase (CATHFLO), 2 mg, Intracatheter, Every 1 Minute as needed, 21 of 23 cycles  Administration: 2 mg (1/3/2024)  pegfilgrastim (NEULASTA), 6 mg, Subcutaneous, Once, 12 of 12 cycles  Administration: " 6 mg (10/25/2023), 6 mg (11/8/2023), 6 mg (11/22/2023), 6 mg (12/6/2023), 6 mg (12/20/2023), 6 mg (1/12/2024), 6 mg (1/25/2024), 6 mg (4/25/2024), 6 mg (5/9/2024), 6 mg (5/24/2024), 6 mg (6/20/2024)  fluorouracil (ADRUCIL), 895 mg, Intravenous, Once, 21 of 23 cycles  Dose modification: 320 mg/m2 (original dose 400 mg/m2, Cycle 11)  Administration: 900 mg (7/31/2023), 900 mg (8/14/2023), 900 mg (8/28/2023), 900 mg (9/11/2023), 900 mg (9/25/2023), 900 mg (10/9/2023), 900 mg (10/23/2023), 895 mg (11/6/2023), 895 mg (11/20/2023), 895 mg (12/4/2023), 700 mg (12/18/2023), 680 mg (1/10/2024), 680 mg (1/23/2024), 625 mg (4/23/2024), 625 mg (5/7/2024), 625 mg (5/22/2024), 625 mg (6/4/2024), 625 mg (6/18/2024), 625 mg (7/1/2024), 625 mg (7/16/2024), 625 mg (7/30/2024)  nivolumab (OPDIVO) IVPB, 240 mg (100 % of original dose 240 mg), Intravenous, Once, 13 of 15 cycles  Dose modification: 240 mg (original dose 240 mg, Cycle 9)  Administration: 240 mg (11/20/2023), 240 mg (12/4/2023), 240 mg (12/18/2023), 240 mg (1/10/2024), 240 mg (1/23/2024), 240 mg (4/23/2024), 240 mg (5/7/2024), 240 mg (5/22/2024), 240 mg (6/4/2024), 240 mg (6/18/2024), 240 mg (7/1/2024), 240 mg (7/16/2024), 240 mg (7/30/2024)  leucovorin calcium IVPB, 896 mg, Intravenous, Once, 21 of 23 cycles  Administration: 900 mg (7/31/2023), 900 mg (8/14/2023), 900 mg (8/28/2023), 900 mg (9/11/2023), 900 mg (9/25/2023), 900 mg (10/9/2023), 900 mg (10/23/2023), 900 mg (11/6/2023), 900 mg (11/20/2023), 900 mg (12/4/2023), 900 mg (12/18/2023), 850 mg (1/10/2024), 850 mg (1/23/2024), 800 mg (4/23/2024), 800 mg (5/7/2024), 800 mg (5/22/2024), 800 mg (6/4/2024), 800 mg (6/18/2024), 800 mg (7/1/2024), 800 mg (7/16/2024), 800 mg (7/30/2024)  oxaliplatin (ELOXATIN) chemo infusion, 85 mg/m2 = 190.4 mg, Intravenous, Once, 12 of 12 cycles  Dose modification: 68 mg/m2 (original dose 85 mg/m2, Cycle 11, Reason: Other (Must fill in a comment), Comment: increased  fatigue)  Administration: 190.4 mg (7/31/2023), 190.4 mg (8/14/2023), 200 mg (8/28/2023), 200 mg (9/11/2023), 200 mg (9/25/2023), 200 mg (10/9/2023), 200 mg (10/23/2023), 200 mg (11/6/2023), 200 mg (11/20/2023), 190.4 mg (12/4/2023), 150 mg (12/18/2023), 150 mg (1/10/2024)  fluorouracil (ADRUCIL) ambulatory infusion Soln, 1,200 mg/m2/day = 5,375 mg, Intravenous, Over 46 hours, 21 of 23 cycles     9/26/2023 -  Cancer Staged    Staging form: Colon and Rectum, AJCC 8th Edition  - Pathologic: pT3, pN0, cM1 - Signed by Vickie Israel MD on 4/3/2024  Total positive nodes: 0       3/12/2024 Surgery    A. Terminal ileum, appendix, right colon (right hemicolectomy):  - Adenocarcinoma (5.2cm)  - Forty-nine (49) lymph nodes negative for carcinoma (0/49)    - Acellular mucin present in one (1) lymph node   - See staging synoptic (ypT3N0)     1/8/2025 - 5/16/2025 Chemotherapy    fluorouracil (ADRUCIL), 400 mg/m2 = 770 mg, 2 of 2 cycles  Administration: 770 mg (1/8/2025), 770 mg (1/22/2025)  irinotecan (CAMPTOSAR) chemo infusion, 173 mg (50 % of original dose 180 mg/m2), 8 of 11 cycles  Dose modification: 90 mg/m2 (original dose 180 mg/m2, Cycle 1, Reason: Anticipated Tolerance, Comment: 50% dose reduction due to pre-existing diarrhea)  Administration: 180 mg (1/8/2025), 180 mg (1/22/2025), 160 mg (3/5/2025), 160 mg (3/19/2025), 160 mg (4/2/2025), 160 mg (4/16/2025), 160 mg (4/30/2025), 160 mg (5/14/2025)  leucovorin calcium IVPB, 768 mg, 2 of 2 cycles  Administration: 800 mg (1/8/2025), 800 mg (1/22/2025)  fluorouracil (ADRUCIL) ambulatory infusion Soln, 1,200 mg/m2/day = 4,610 mg, 8 of 11 cycles     6/11/2025 -  Chemotherapy    bevacizumab (AVASTIN) IVPB, 5 mg/kg = 362.5 mg (50 % of original dose 10 mg/kg), 0 of 6 cycles  Dose modification: 5 mg/kg (original dose 10 mg/kg, Cycle 1, Reason: Other (Must fill in a comment), Comment: per protocol)     Right tonsillar squamous cell carcinoma (HCC)   7/27/2023 Initial Diagnosis     Right tonsillar squamous cell carcinoma (HCC)     7/27/2023 -  Cancer Staged    Staging form: Pharynx - Oropharynx, AJCC 8th Edition  - Clinical stage from 7/27/2023: Stage III (cT1, cN3, cM0, p16+) - Signed by Evan Plummer MD on 7/27/2023  Stage prefix: Initial diagnosis       9/16/2024 - 11/13/2024 Radiation      Plan ID Energy Fractions Dose per Fraction (cGy) Dose Correction (cGy) Total Dose Delivered (cGy) Elapsed Days   Head_Neck 6X-FFF 35 / 35 200 0 7,000 58    C2: 9/16/2024 - 11/13/2024 9/17/2024 - 10/21/2024 Chemotherapy    alteplase (CATHFLO), 2 mg, Intracatheter, Every 1 Minute as needed, 6 of 6 cycles  Administration: 2 mg (10/21/2024)  CISplatin (PLATINOL) infusion, 70 mg (original dose 40 mg/m2), Intravenous, Once, 6 of 6 cycles  Dose modification: 70 mg (original dose 40 mg/m2, Cycle 1, Reason: Anticipated Tolerance), 30 mg/m2 (original dose 40 mg/m2, Cycle 4, Reason: Neuropathy, Comment: dose reduction to 30 mg/m2 due to neuropathy)  Administration: 70 mg (9/17/2024), 70 mg (9/23/2024), 70 mg (9/30/2024), 59.1 mg (10/7/2024), 59.1 mg (10/14/2024), 59.1 mg (10/21/2024)  sodium chloride, 500 mL, Intravenous, Once, 6 of 6 cycles  Administration: 500 mL (9/17/2024), 500 mL (9/17/2024), 500 mL (9/23/2024), 500 mL (9/23/2024), 500 mL (9/30/2024), 500 mL (9/30/2024), 500 mL (10/7/2024), 500 mL (10/7/2024), 500 mL (10/14/2024), 500 mL (10/14/2024), 500 mL (10/21/2024)  fosaprepitant (EMEND) IVPB, 150 mg, Intravenous, Once, 6 of 6 cycles  Administration: 150 mg (9/17/2024), 150 mg (9/23/2024), 150 mg (9/30/2024), 150 mg (10/7/2024), 150 mg (10/14/2024), 150 mg (10/21/2024)  IVPB builder, , Intravenous, Once, 1 of 1 cycle  Administration: Unknown dose (10/21/2024)       Past Medical & Surgical Hx:   Patient Active Problem List   Diagnosis    Primary hypertension    Mixed hyperlipidemia    Malignant neoplasm of right female breast (HCC)    Vitamin D deficiency    Prediabetes    Right thyroid  nodule    GERD (gastroesophageal reflux disease)    Obesity    Metastatic colon cancer to liver (HCC)    Right tonsillar squamous cell carcinoma (HCC)    Poor appetite    Nutritional anemia    Dehydration    Drug-induced constipation    Chemotherapy induced neutropenia (HCC)    Stage 3a chronic kidney disease (HCC)    Thrombocytopenia (HCC)    Neuropathy    Diastolic dysfunction    Hypokalemia    Leukemoid reaction    GOGO (acute kidney injury) (HCC)    Acute post-operative pain    At risk for constipation    At risk for delirium    Palliative care encounter    Physical deconditioning    History of CVA (cerebrovascular accident)    Chronic nasal congestion    Elevated LFTs    Vitamin B12 deficiency    Neoplastic malignant related fatigue    Sensation, choking    S/P percutaneous endoscopic gastrostomy (PEG) tube placement (HCC)    Pancytopenia due to chemotherapy (HCC)    Cancer related pain    Hypomagnesemia    Functional diarrhea    Antineoplastic chemotherapy induced anemia    Hypertensive heart and renal disease with congestive heart failure (HCC)     Past Medical History:   Diagnosis Date    Anemia     Arthritis     Body mass index (BMI) 40.0-44.9, adult (HCC) 9/22/2023    Breast cancer (HCC) 12/17/2018    Cancer (HCC)     right breast, colon, liver, right tonsil    Cervical lymphadenopathy 3/21/2023    CT neck on 3/30/2023- Large right level 2A lymphadenopathy as described above and suspicious for neoplasm.  Correlation with histopathology is recommended.  Mild asymmetric prominence of the right palatine tonsil with otherwise no definitive nodular enhancing lesions identified along the course of the aerodigestive tract.     5/26/23- FNA of this node was nonrevealing for tissue etiology, but it d    Encounter for screening for HIV 07/07/2022    Follow-up examination 04/04/2023    GERD (gastroesophageal reflux disease)     Hyperlipidemia     Hypertension     Obesity     Stroke (HCC)     TIA     Stroke (HCC)      TIA     Transaminitis 2021    Vasomotor rhinitis 2018    Refilled flonase today. Stopped taking since 2022     Past Surgical History:   Procedure Laterality Date    BREAST BIOPSY Right 2018    us guided bx cancer    BREAST SURGERY      COLONOSCOPY      ESOPHAGOSCOPY N/A 2023    Procedure: ESOPHAGOSCOPY;  Surgeon: David Chapa MD;  [facility];  Service: ENT    HEMICOLOECTOMY W/ ANASTOMOSIS Right 3/12/2024    Procedure: RIGHT COLECTOMY WITH ROBOTICS;  Surgeon: Vickie Israel MD;  [facility];  Service: Surgical Oncology    IR BIOPSY LIVER MASS  2023    IR CRYOABLATION  6/3/2024    IR GASTROSTOMY (G) TUBE CHECK/CHANGE/REINSERTION/UPSIZE  2025    IR GASTROSTOMY TUBE PLACEMENT  10/2/2024    IR PORT CHECK  2024    IR PORT PLACEMENT  2023    IR PORT STRIPPING  2024    LAPAROTOMY N/A 3/12/2024    Procedure: LAPAROTOMY EXPLORATORY W/ ROBOTICS;  Surgeon: Vickie Israel MD;  [facility];  Service: Surgical Oncology    AL BIOPSY NASOPHARYNX VISIBLE LESION SIMPLE N/A 10/9/2024    Procedure: NASOPHARYNGOSCOPY with biospy;  Surgeon: Juanito Matthew MD;  [facility];  Service: ENT    [facility] W/CELL WASHG SPX N/A 2023    Procedure: BRONCHOSCOPY;  Surgeon: David Chapa MD;  [facility];  Service: ENT    AL LARYNGOSCOPY W/BIOPSY MICROSCOPE/TELESCOPE N/A 2023    Procedure: MICRODIRECT LARYNGOSCOPY WITH BIOPSY;  Surgeon: David Chapa MD;  [facility];  Service: ENT    AL LARYNGOSCOPY W/WO TRACHEOSCOPY DX EXCEPT  N/A 10/9/2024    Procedure: DIRECT LARYNGOSCOPY;  Surgeon: Juanito Matthew MD;  [facility];  Service: ENT    AL MASTECTOMY PARTIAL W/AXILLARY LYMPHADENECTOMY Right 2018    Procedure: ULTRASOUND LOCALIZED PARTIAL MASTECTOMY W/SENTINEL NODE BIOPSY POSS AXILLARY DISSECTION;  Surgeon: Zahida Coronado MD;  Location: [facility];  Service:  General    HI TONSILLECTOMY PRIMARY/SECONDARY AGE 13/> N/A 10/9/2024    Procedure: TONSILLECTOMY;  Surgeon: Juanito Matthew MD;  [facility];  Service: ENT    TUBAL LIGATION      US GUIDED BREAST BIOPSY RIGHT COMPLETE Right 11/23/2018    US GUIDED INJECTION FOR RESEARCH STUDY  12/20/2018    US GUIDED INJECTION FOR RESEARCH STUDY  12/07/2018    US GUIDED INJECTION FOR RESEARCH STUDY  05/03/2019    US GUIDED LYMPH NODE BIOPSY RIGHT  05/26/2023       Review of Medications:   Vitamins, Supplements and Herbals: No, pt denies taking supplements    Current Outpatient Medications:     acetaminophen (TYLENOL) 160 mg/5 mL liquid, Take 20 mL (640 mg total) by mouth every 6 (six) hours as needed for mild pain for up to 10 days, Disp: 473 mL, Rfl: 3    anastrozole (ARIMIDEX) 1 mg tablet, Take 1 tablet (1 mg total) by mouth daily, Disp: 90 tablet, Rfl: 0    atorvastatin (LIPITOR) 40 mg tablet, Take 1 tablet (40 mg total) by mouth daily at bedtime, Disp: 30 tablet, Rfl: 2    cholestyramine (QUESTRAN) 4 g packet, Take 1 packet (4 g total) by mouth 3 (three) times a day with meals, Disp: 90 packet, Rfl: 3    diphenhydramine, lidocaine, Al/Mg hydroxide, simethicone (Magic Mouthwash) SUSP, Swish and swallow 10 mL every 4 (four) hours as needed for mouth pain or discomfort (Patient not taking: Reported on 1/31/2025), Disp: 480 mL, Rfl: 2    docusate sodium (COLACE) 50 mg capsule, Take 1 capsule (50 mg total) by mouth 2 (two) times a day, Disp: 90 capsule, Rfl: 1    dronabinol (MARINOL) 2.5 mg capsule, Take 1 capsule (2.5 mg total) by mouth in the morning, Disp: 5 capsule, Rfl: 0    ergocalciferol (VITAMIN D2) 50,000 units, Take 1 capsule (50,000 Units total) by mouth once a week, Disp: 90 capsule, Rfl: 0    folic acid (FOLVITE) 1 mg tablet, Take 1 tablet (1 mg total) by mouth in the morning, Disp: 90 tablet, Rfl: 3    gabapentin (NEURONTIN) 300 mg capsule, Take 1 pills in the morning, 1 pill at lunch and 2 pills before bed,  Disp: 120 capsule, Rfl: 0    hydrocortisone 1 % ointment, , Disp: , Rfl:     ibuprofen (MOTRIN) 100 mg/5 mL suspension, Take 20 mL (400 mg total) by mouth every 6 (six) hours as needed for moderate pain (Patient not taking: Reported on 1/31/2025), Disp: 473 mL, Rfl: 0    lidocaine-prilocaine (EMLA) cream, Apply topically as needed for mild pain (Patient not taking: Reported on 12/6/2024), Disp: 30 g, Rfl: 3    losartan (COZAAR) 50 mg tablet, Take 1 tablet (50 mg total) by mouth daily, Disp: 90 tablet, Rfl: 2    magnesium Oxide (MAG-OX) 400 mg TABS, 1 tablet (400 mg total) by Per G Tube route 2 (two) times a day (Patient not taking: Reported on 1/31/2025), Disp: 60 tablet, Rfl: 0    mirtazapine (REMERON) 15 mg tablet, Take 1 tablet (15 mg total) by mouth daily at bedtime Patient is also on a 30 mg tablet, for a total dose of 45 mg, Disp: 30 tablet, Rfl: 0    mirtazapine (REMERON) 30 mg tablet, Take 1 tablet (30 mg total) by mouth daily at bedtime, Disp: 30 tablet, Rfl: 0    nystatin (MYCOSTATIN) 500,000 units/5 mL suspension, Apply 5 mL (500,000 Units total) to the mouth or throat 4 (four) times a day, Disp: 600 mL, Rfl: 3    omeprazole (PriLOSEC) 20 mg delayed release capsule, Take 1 capsule (20 mg total) by mouth daily, Disp: 30 capsule, Rfl: 3    ondansetron (ZOFRAN) 8 mg tablet, Take 1 tablet (8 mg total) by mouth every 8 (eight) hours as needed for nausea or vomiting, Disp: 90 tablet, Rfl: 2    oxyCODONE (ROXICODONE) 5 mg/5 mL solution, Take 5 mL (5 mg total) by mouth every 6 (six) hours as needed for severe pain ((breakthrough pain)) Max Daily Amount: 20 mg, Disp: 473 mL, Rfl: 0    potassium chloride (Klor-Con M20) 20 mEq tablet, Take 1 tablet (20 mEq total) by mouth 2 (two) times a day (Patient not taking: Reported on 1/31/2025), Disp: 90 tablet, Rfl: 1    potassium chloride 10% oral solution, 15 mL (20 mEq total) by Per G Tube route 2 (two) times a day, Disp: 473 mL, Rfl: 0    prochlorperazine (COMPAZINE) 10  "mg tablet, Take 1 tablet (10 mg total) by mouth every 6 (six) hours as needed for nausea or vomiting, Disp: 90 tablet, Rfl: 2    pyridoxine (VITAMIN B6) 50 mg tablet, Take 1 tablet (50 mg total) by mouth daily, Disp: 90 tablet, Rfl: 3    Trifluridine-Tipiracil 20-8.19 MG TABS, Take 3 tablets by mouth 2 (two) times a day Days 1-5 and 8-12 every 28 days, Disp: 60 tablet, Rfl: 5    vitamin B-12 (VITAMIN B-12) 1,000 mcg tablet, , Disp: , Rfl:   No current facility-administered medications for this visit.    Facility-Administered Medications Ordered in Other Visits:     alteplase (CATHFLO) injection 2 mg, 2 mg, Intracatheter, Q1MIN PRN, Harika Medina DO    Most Recent Lab Results:   Lab Results   Component Value Date    WBC 25.57 (H) 05/21/2025    NEUTROABS 7.93 (H) 05/12/2025    IRON 21 (L) 02/28/2025    TIBC 170.8 (L) 02/28/2025    FERRITIN 712 (H) 02/28/2025    CHOLESTEROL 167 04/24/2024    TRIG 137 04/24/2024    HDL 43 (L) 04/24/2024    LDLCALC 97 04/24/2024    ALT 8 05/21/2025    AST 20 05/21/2025    ALB 3.6 05/21/2025    SODIUM 135 05/21/2025    SODIUM 133 (L) 05/19/2025    K 4.0 05/21/2025    K 4.1 05/19/2025     05/21/2025    BUN 23 05/21/2025    BUN 30 (H) 05/19/2025    CREATININE 1.02 05/21/2025    CREATININE 1.08 05/19/2025    EGFR 56 05/21/2025    PHOS 2.8 11/01/2024    PHOS 3.3 10/30/2024    TSH 1.58 01/03/2022    GLUCOSE 209 (H) 03/12/2024    POCGLU 89 12/27/2024    GLUF 102 (H) 10/30/2024    GLUF 95 09/13/2024    GLUC 88 05/21/2025    HGBA1C 5.5 02/21/2024    HGBA1C 5.9 (H) 06/13/2023    HGBA1C 6.1 (H) 07/09/2022    CALCIUM 9.5 05/21/2025    FOLATE 14.1 02/28/2025    MG 2.1 05/21/2025       Anthropometric Measurements:   Height: 66\"  Ht Readings from Last 1 Encounters:   05/27/25 5' 5\" (1.651 m)     Wt Readings from Last 20 Encounters:   05/27/25 72.5 kg (159 lb 12.8 oz)   05/14/25 72.6 kg (160 lb 0.9 oz)   05/07/25 73 kg (161 lb)   04/30/25 73.5 kg (162 lb)   04/30/25 73 kg (161 lb)   04/16/25 " 73.5 kg (162 lb)   04/02/25 72.6 kg (160 lb 0.9 oz)   04/01/25 72.6 kg (160 lb)   03/19/25 70.8 kg (156 lb 1.4 oz)   03/18/25 71.2 kg (157 lb)   03/05/25 73.1 kg (161 lb 2.5 oz)   02/28/25 72.6 kg (160 lb)   02/18/25 73 kg (161 lb)   02/04/25 73.4 kg (161 lb 12.8 oz)   01/31/25 75.1 kg (165 lb 9.6 oz)   01/30/25 75.6 kg (166 lb 10.7 oz)   01/22/25 75.6 kg (166 lb 10.7 oz)   01/10/25 77.1 kg (170 lb)   01/08/25 76.5 kg (168 lb 10.4 oz)   12/31/24 82.6 kg (182 lb)       Weight History:   Usual Weight: 275#  Varian: (2/22/19) 264.6#, (4/2/19) 263.4, (9/16/24) 197.4#, (9/23/24) 194#, (9/30/24) 192.8#, (10/7/24) 190.2#, (10/11/24) 194.6#, (10/14/24) 191#, (10/17/24) 192.8#, (10/21/24) 190#, (10/24/24) 190#, (10/28/24) 189#, (11/11/24) 185#  Home Scale: n/a    Oncology Nutrition-Anthropometrics      Flowsheet Row Nutrition from 5/28/2025 in [facility] Nutrition from 5/14/2025 in [facility]   Patient [age] (years): 70 years 70 years   Patient (female) height (in): 61 in 61 in   Current Weight to be used for anthropometric calculations (lbs) 159.8 lbs 160.1 lbs   Current Weight to be used for anthropometric calculations (kg) 72.6 kg 72.8 kg   BMI: 30.2 30.2   IBW female: 105 lbs 105 lbs   IBW (kg) female: 47.7 kg 47.7 kg   IBW % (female) 152.2 % 152.5 %   Adjusted BW (female): 118.7 lbs 118.8 lbs   Adjusted BW kg (female): 54 kg 54 kg   % weight change after 1 week: -- -0.6 %   Weight change after 1 week (lbs) -- -0.9 lbs   % weight change after 1 month: -0.7 % -1.2 %   Weight change after 1 month (lbs) -1.2 lbs -1.9 lbs   % weight change after 3 months: -0.1 % -0.6 %   Weight change after 3 months (lbs) -0.2 lbs -0.9 lbs            Nutrition-Focused Physical Findings: n/a due to telephone call    Food/Nutrition-Related History & Client/Social History:    Current Nutrition Impact Symptoms:  [] Nausea  [x] Reduced Appetite  [] Acid Reflux    []  Vomiting  [] Unintended Wt Loss -stable now [] Malabsorption    [] Diarrhea -resolved [] Unintended Wt Gain  [] Dumping Syndrome    [] Constipation -BM every other day [] Thick Mucous/Secretions  [] Abdominal Pain    [x] Dysgeusia (Altered Taste) -starting to improve [x] Xerostomia (Dry Mouth)  [] Gas    [] Dysosmia (Altered Smell)  [] Thrush  [] Difficulty Chewing    [] Oral Mucositis (Sore Mouth)  [x] Fatigue  [] Hyperglycemia   Lab Results   Component Value Date    HGBA1C 5.5 2024      [x] Odynophagia -improving [] Esophagitis  [] Other:    [x] Dysphagia   Follows with SLP  Mechanical soft per SLP recommendation  Video swallow scheduled for  [] Early Satiety  [] No Problems Eating      Food Allergies & Intolerances: no    Current Diet: Soft/Moist and Tube Feeding  Current Nutrition Intake: Unchanged from last visit  Appetite: Fair   Nutrition Route: PO and PEG  Oral Care: brushes daily  Activity level: ADLs.     24 Hr Diet Recall:   Breakfast: oatmeal  Lunch: egg salad (no bread)  Dinner: lasagna    Beverages: water (sips throughout the day)  Supplements:   Ensure Complete (10 oz, 350 kcal, 30 g pro) -2 daily, usually puts through tube, sometimes will drink them    EN Recall:  DME: [employer]  Access Type: PEG  Formula: [employer] Peptide 1.5  Method: Bolus  Regimen: 11oz (325 mL) 1-2  times per day of [employer] 1.5 via PEG (gravity syringe method to administer boluses; 1 container infusing over ~15-20 minutes).  Flush: 60 mL free water flush  pre/post each bolus (120 mL x 1-2 boluses = 120-240 mL)  EN providin mL volume (1 carton), 500kcal, 24g protein, 228 mL free water  650 mL volume (2 cartons/day), 1000 kcal (46% est needs), 48g protein (55% est needs), 456 mL free water  Pt also flushing 1-2 syringes every few hours for added hydration. Pt gets IVF 3x/week.       Oncology Nutrition-Estimated Needs      Flowsheet Row Nutrition from 2025 in [facility]  Services Nutrition from 2025 in [facility]   Weight type used Actual weight Actual weight   Weight in kilograms (kg) used for estimated needs 73.2 kg 77.3 kg   Energy needs formula:  30-35 kcal/kg 30-35 kcal/kg   Energy needs based on 30 kcal/k kcal 2318 kcal   Energy needs based on 35 kcal/k kcal 2705 kcal   Protein needs formula: 1.2-1.5 g/kg 1.2-1.5 g/kg   Protein needs based on 1.2 g/k g 93 g   Protein needs based on 1.5 g/kg 110 g 116 g   Fluid needs formula: 30-35 mL/kg 30-35 mL/kg   Fluid needs based on 30 mL/kg 2196 mL 2319 mL   Fluid needs in ounces 74 oz 78 oz   Fluid needs based on 35 mL/kg 2562 mL 2706 mL   Fluid needs in ounces 87 oz 91 oz             Discussion & Intervention:   Maira was evaluated today for an RD follow up regarding HNC and enteral nutrition.  Maira has completed tx for HNC and is currently undergoing tx for metastatic colon cancer.  met with Maira today in the infusion center. There has been progression of disease and she will be starting a new tx in 2 weeks. Since last RD visit, she has cut back her TF to one carton/day but she did state that there have been days she doesn't have an appetite and will administer 2 cartons then. I encouraged her to continue this and do 1-2 cartons of TF daily as needed. She is eating about the same orally as last visit. She does have a video swallow study scheduled with SLP this week. Her weight is stable.    Reviewed 24 hour recall, which revealed an adequate enteral intake and PO intake, and discussed ways to optimize nutrient intake.  Also reviewed the importance of wt management throughout the tx process and the role of enteral nutrition in managing wt and overall health.  Based on today's assessment, discussion included: MNT for:   fluid, soft/mechanical/pureed diet,  enteral nutrition, practicing proper oral care, adequate hydration & fluid choices, utilizing oral nutrition  supplements, practicing proper feeding tube use & care, adherence to and compliance with enteral nutrition plan of care, and individualized enteral nutrition recommendations & plan:  .   Moving forward, Maira was encouraged to continue TF plan and continue increasing oral intake   Materials Provided: Ensure samples and Ensure Coupons  All questions and concerns addressed during today’s visit.  Maira has RD contact information.    Nutrition Diagnosis:   Increased Nutrient Needs (kcal & pro) related to increased demand for nutrients and disease state as evidenced by cancer dx and pt undergoing tx for cancer.  Swallowing Difficulty related to diagnosis/treatment related causes as evidenced by  need for feeding tube, diet restriction per SLP.  Monitoring & Evaluation:   Goals:  weight maintenance/stabilization  adequate nutrition impact symptom management  pt to meet >/=75% estimated nutrition needs daily  Utilize PEG for supplemental nutrition    Progress Towards Goals: Progressing    Nutrition Rx & Recommendations:  Diet: enteral nutrition, soft/moist high calorie high protein  Stay hydrated by sipping fluids of choice/tolerance throughout the day.    Follow proper oral care; Try baking soda/salt water rinse recipe (mix 3/4 tsp salt + 1 tsp baking soda + 1 qt water; rinse with plain water after using) in Eating Hints book (pg 18).  Brush your teeth before/after meals & before bed.  Weigh yourself regularly. If you notice weight loss, make an effort to increase your daily food/calorie intake. If you continue to notice loss after these efforts, reach out to your dietitian to establish a plan to stabilize weight.   Continue with Ensure Plus/Complete 2 times daily. try drinking orally, but OK to put through PEG if tolerated better  Choose liquids with calories: whole milk, Fairlife milk (higher protein/lactose-free milk), chocolate milk, drinkable yogurt, kefir, 100% fruit juice, diluted juice, bone broth (higher  protein broth), creamy soups, sports drinks (Gatorade, Poweraide, Pedialyte, etc.), Italian ice, popsicles, milkshakes, smoothies, oral nutrition supplements (Ensure, Boost, etc.), gelatin/Jello, etc.  Soft/easy to swallow foods that are high in calories and protein: casseroles, chicken/egg/tuna salad with extra garcia to add calories and moisture, oatmeal/cream of wheat made with whole milk, cottage cheese, whole milk Greek yogurt, scrambled eggs with cheese, avocado, macaroni and cheese, mashed potatoes made with butter and whole milk or dry milk powder, ground meat with extra sauce/gravy, canned fruit, peanut butter, cream soups (cream of chicken, cream of mushroom, broccoli cheddar), pudding made with whole milk, custard, ice cream, banana, milkshakes/smoothies, Review page 47 of Eating Hints Book for more ideas.   TF plan -   TF Goal:  Continue Vast 1.5, continue at least 1 carton/day. On days your intake is decreased or appetite is poor, consume 2 cartons of NMRKT.  Contact RD for substitutions  Call [facility] when you have 10 days left of TF supplies: [phone], option 3 (customer support).  If you notice weight loss or difficulty consuming enough PO, can increase back to 2 cartons/day  High calorie snacks/meals to have when cutting back on TF  1 cup wholly fat greek yogurt with 1 cup of berries with 2 tbsp honey drizzled  1/2 cup of oatmeal made with 1 cup of whole milk + 2 tbsp melted peanut butter  Chicken, tuna, or egg salad (1 cup) and 1 cup whole milk   Milkshake made with 2 tablespoon peanut or almond butter, 1 frozen banana, ½ cup whole milk, ½ cup ice cream   Snack wrap: 1 flour tortilla, 2 slices ham, 2 slices cheese, and 1 tablespoon mayonnaise   Stuffed domitila: ¼ cup hummus, 1/2-1 whole avocado, 1/2 cup mashed beans    Follow Up Plan: during infusion on 6/11/25   Recommend Referral to Other Providers: none at this time

## 2025-05-28 NOTE — PATIENT INSTRUCTIONS
Nutrition Rx & Recommendations:  Diet: enteral nutrition, soft/moist high calorie high protein  Stay hydrated by sipping fluids of choice/tolerance throughout the day.   Follow proper oral care; Try baking soda/salt water rinse recipe (mix 3/4 tsp salt + 1 tsp baking soda + 1 qt water; rinse with plain water after using) in Eating Hints book (pg 18).  Brush your teeth before/after meals & before bed.  Weigh yourself regularly. If you notice weight loss, make an effort to increase your daily food/calorie intake. If you continue to notice loss after these efforts, reach out to your dietitian to establish a plan to stabilize weight.   Continue with Ensure Plus/Complete 2 times daily. try drinking orally, but OK to put through PEG if tolerated better  Choose liquids with calories: whole milk, Fairlife milk (higher protein/lactose-free milk), chocolate milk, drinkable yogurt, kefir, 100% fruit juice, diluted juice, bone broth (higher protein broth), creamy soups, sports drinks (Gatorade, Poweraide, Pedialyte, etc.), Italian ice, popsicles, milkshakes, smoothies, oral nutrition supplements (Ensure, Boost, etc.), gelatin/Jello, etc.  Soft/easy to swallow foods that are high in calories and protein: casseroles, chicken/egg/tuna salad with extra garcia to add calories and moisture, oatmeal/cream of wheat made with whole milk, cottage cheese, whole milk Greek yogurt, scrambled eggs with cheese, avocado, macaroni and cheese, mashed potatoes made with butter and whole milk or dry milk powder, ground meat with extra sauce/gravy, canned fruit, peanut butter, cream soups (cream of chicken, cream of mushroom, broccoli cheddar), pudding made with whole milk, custard, ice cream, banana, milkshakes/smoothies, Review page 47 of Eating Hints Book for more ideas.   TF plan -   TF Goal:  Continue ClicData 1.5, continue at least 1 carton/day. On days your intake is decreased or appetite is poor, consume 2 cartons of [employer].  Contact [facility]  for substitutions  Call [facility] when you have 10 days left of TF supplies: [phone], option 3 (customer support).  If you notice weight loss or difficulty consuming enough PO, can increase back to 2 cartons/day  High calorie snacks/meals to have when cutting back on TF  1 cup wholly fat greek yogurt with 1 cup of berries with 2 tbsp honey drizzled  1/2 cup of oatmeal made with 1 cup of whole milk + 2 tbsp melted peanut butter  Chicken, tuna, or egg salad (1 cup) and 1 cup whole milk   Milkshake made with 2 tablespoon peanut or almond butter, 1 frozen banana, ½ cup whole milk, ½ cup ice cream   Snack wrap: 1 flour tortilla, 2 slices ham, 2 slices cheese, and 1 tablespoon mayonnaise   Stuffed domitila: ¼ cup hummus, 1/2-1 whole avocado, 1/2 cup mashed beans    Follow Up Plan: during infusion on 6/11/25   Recommend Referral to Other Providers: none at this time

## 2025-05-29 ENCOUNTER — APPOINTMENT (OUTPATIENT)
Dept: SPEECH THERAPY | Facility: CLINIC | Age: 70
End: 2025-05-29
Attending: INTERNAL MEDICINE
Payer: MEDICARE

## 2025-05-29 ENCOUNTER — HOSPITAL ENCOUNTER (OUTPATIENT)
Dept: RADIOLOGY | Facility: HOSPITAL | Age: 70
Discharge: HOME | End: 2025-05-29
Attending: INTERNAL MEDICINE
Payer: MEDICARE

## 2025-05-29 DIAGNOSIS — R13.12 DYSPHAGIA, OROPHARYNGEAL PHASE: ICD-10-CM

## 2025-05-29 DIAGNOSIS — C09.9 RIGHT TONSILLAR SQUAMOUS CELL CARCINOMA (HCC): ICD-10-CM

## 2025-05-29 DIAGNOSIS — Z90.89 STATUS POST TONSILLECTOMY: ICD-10-CM

## 2025-05-29 PROCEDURE — 74230 X-RAY XM SWLNG FUNCJ C+: CPT

## 2025-05-29 PROCEDURE — 92611 MOTION FLUOROSCOPY/SWALLOW: CPT

## 2025-05-29 NOTE — PROCEDURES
Video Swallow Study      Patient Name: Maira Moura  Today's Date: 5/29/2025        Past Medical History  Past Medical History[1]     Past Surgical History  Past Surgical History[2]        Modified (Video) Barium Swallow Study    Summary:  Images are on PACS for review.     Oral stage: Mild  WNL for lip closure, slow/careful/frontal/adequate mastication w/ min trial solid. Reduced  bolus formation and control w/ liquids. Improved w/ cue for smaller sips and oral prep set. Transfer was adequate. Trace lingual residue. (+ Posterior lingual residue)     Pharyngeal stage:Mod  WNL for prompt swallow. Trace column of contrast between soft palate and PPW (1). Material retropulsed from the tongue base/valleculae inconsistently. ? TB intermittently moving more superior instead of posterior. Partial laryngeal elevation (1). Partial anterior hyoid excursion (1). Partial or absent epiglottic inversion (1,2), Incomplete laryngeal vestibular closure (1). Diminished pharyngeal constriction (1). Complete passage through UES/UES opening (0).  Reduced tongue base retraction/wide column of contrast (3). 1/4th cookie bolus remained at the BOT and multiple swallows were needed to clear. Min chin down and head rotation b/l did not appear effective in clearing. + Transient penetration, trace penetration, and/or aspiration of thin. Aspiration was often 2* spill of retention prior to the secondary swallow. Reduced or eliminated w/ smaller sip and oral prep set.  Cough response was inconsistent. Pt was cued to cough. Appeared to be suppressing her cough x 2. Educated that if she felt the need to cough she should cough. Retention was primarily BOT/Vallecular. Intermittent mild PPW and pyriform retention that cleared w/ secondary swallow. No penetration or aspiration w/ nectar thick, puree, or min solid trial (1/4 cookie).    Per gross esophageal screen: distal stasis that cleared w/ additional cued  swallow.       Strategy Trialed y/n  Result +/-   Oral prep set              Y  +  Secondary/multiple swallows Y +   Effortful/hard swallow Y ?   Alternation w/ liquids N    Chin down/neck flexion Y -   Volitional cough/throat clear Y +/-   Head turn/rotation Y Possibly to R w/ liquds though sip size was also smaller   Breath hold/cough N    Head tilt N    Other: Smaller sip size  Y  +    Recommendations:  Diet:PEG for primary. Purees, (trial small bites soft/moist foods), thin water, other liquids nectar (no aspiration of nectar today). May want to avoid or limit mixed textures for now.   Meds:PEG  Strategies:Small single sips, oral prep set, multiple swallows, do not suppress cough, periodic volitional cough  Frequent/thorough oral care  Upright position  F/u ST tx: Yes per treating slp  PRN Supervision  Aspiration Precautions  Reflux Precautions  Consider consult with: nutrition following  Results reviewed with: pt  Repeat MBS as necessary  If a dedicated assessment of the esophagus is desired:  Consult w/ SLP prior to doing any additional barium studies if the pt aspirated during this study. Consider esophagram/routine barium swallow w/ barium tablet administration, FL Upper GI/UGI, or EGD (Options may be limited if pt cannot stand/maneuver for barium studies).     Pt is a 70yof known to me from initial MBS. (This is her 3rd). S/p Rad and chemo. F/u w/ OP SLP (vital stim w/ other interventions). Reported she coughs sometimes when drinking . Drinks water from a bottle (trialed sips w/ neck extension during mbs as the pt stated she thinks she tilts her head back when drinking). Eats/likes mashed potatoes.     Functional Oral Intake Scale (FOIS)  Matthew Ochoa PhD, F-GONZALO  (Does not include liquids)  3.Tube dependent with consistent intake of food or liquid.    H&P/pertinent provider notes as per their charting: (PMH noted above)  Refer to other provider reports for complete details.   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 On completion of definitive chemoradiation, repeat PET-CT was obtained on December 27th, 2024; and demonstrated interval near-complete metabolic response to therapy. Will re-evaluate disease status via CT Neck, given need for change in therapy for metastatic colon cancer.   Metastatic colon cancer to liver (HCC)  Patient is a 69-year-old postmenopausal female, with a complex Oncologic history, including synchronous de naveed metastatic adenocarcinoma of the mid-ascending colon (Complicated by partial-obstruction requiring right hemicolectomy on March 15th, 2024) with biopsy-proven oligometastatic disease to the liver (Status-post cryoablation on June 3rd, 2024) with continued disease through multiple-lines of therapy; who presents today for interval follow-up. On evaluation this morning, patient is clinically well. Most recent laboratory-studies show growth-factor related leukocytosis (WBC: 55.15), stable macrocytic anemia (Hemoglobin: 9.0 MCV: 100), mild hyponatremia (Sodium: 133), and stable elevation of Alkaline Phosphatase (AST: 24 ALT: 9 ALP: 188). Repeat CT Chest, Abdomen, and Pelvis demonstrates interval progression of metastatic disease, including new and enlarging hepatic-metastases, as well as new small pulmonary nodules. Interval enlargement of the cervical mass (e.g. Drop metastasis versus primary malignancy).     Special Studies:  CT neck scheduled    Previous MBS:  1/13/25:  Images are on PACS for review.   Oral Phase : Pt presented with mild oral dysphagia. Did not administer solids or 13 mm pill for safety precaution. Bolus control and formation were WNL. Transfer was mildly weak. Trace posterior base of  tongue residue.   Pharyngeal Phase : Pt presented with mod pharyngeal dysphagia. Swallow initiation was inconsistently mildly delayed. Multiple swallows per trial approx 3-5. Premature spill to valleculae with liquids and solids trials. With thin liquids premature spill to the pyriforms. Hyoid elevation, tongue base retraction, and laryngeal excursion were reduced. Pharyngeal stripping wave was diminished.  Epiglottic inversion was incomplete. With trials of thin, nectar epiglottic undercoat resulted in trace aspiration after initial swallow. Trace to mild vallecular retention with liquids and mild with puree. Pyriform retention trace to mild with all consistencies. CP prominences evident C5-C6, did not appear to impact bolus flow.  Trace aspiration with thin and ntl liquids. Pt did not have cough response to aspiration. Cued cough ineffective in clearing aspirated residue.Chin tuck and  head turn right/left ineffective in reducing trace aspiration and vallecular residue. Effortful swallow and double swallow did reduce vallecular residue minimally.   Per Esophageal screen   Solids cleared adequately.Retropulsion observed with ntl.   Recommendations:  Diet: PEG for primary, dysphagia 1 pureed   Liquids:Thin, continue to monitor for tolerance with overt s/s aspiration  Strategies:slow rate, alternate liquids with solids, swallow prior to additional po, effortful swallow and double swallow.   Pt also had a prior MBS 8/15/23:  Pt was newly dx R tonsillar squamous call CA  and had 2 chemo tx only at that time.   Regular diet was recommended w/ f/u if swallowing issues arose.     BMI: 26.59  Food allergies: nkfa   Current diet: PEG for primary, purees, water, oral supplement   Premorbid diet: Regular prior to PEG/CA dx and tx   Dentition: Upper plate. 2 bottom teeth   O2 requirement: RA   Oral mech: WFL   Vocal quality/speech: WNL   Cognitive status: Alert, follows direction     Consistencies administered: Puree, hard  cookie, thin, nectar.    Pt was viewed standing laterally and AP            [1]   Past Medical History:  Diagnosis Date    Anemia     Arthritis     Body mass index (BMI) 40.0-44.9, adult (HCC) 9/22/2023    Breast cancer (HCC) 12/17/2018    Cancer (HCC)     right breast, colon, liver, right tonsil    Cervical lymphadenopathy 3/21/2023    CT neck on 3/30/2023- Large right level 2A lymphadenopathy as described above and suspicious for neoplasm.  Correlation with histopathology is recommended.  Mild asymmetric prominence of the right palatine tonsil with otherwise no definitive nodular enhancing lesions identified along the course of the aerodigestive tract.     5/26/23- FNA of this node was nonrevealing for tissue etiology, but it d    Encounter for screening for HIV 07/07/2022    Follow-up examination 04/04/2023    GERD (gastroesophageal reflux disease)     Hyperlipidemia     Hypertension     Obesity     Stroke (HCC)     TIA     Stroke (HCC)     TIA     Transaminitis 09/22/2021    Vasomotor rhinitis 8/9/2018    Refilled flonase today. Stopped taking since January 2022   [2]   Past Surgical History:  Procedure Laterality Date    BREAST BIOPSY Right 11/23/2018    us guided bx cancer    BREAST SURGERY      COLONOSCOPY      ESOPHAGOSCOPY N/A 07/20/2023    Procedure: ESOPHAGOSCOPY;  Surgeon: David Chapa MD;  [facility];  Service: ENT    HEMICOLOECTOMY W/ ANASTOMOSIS Right 3/12/2024    Procedure: RIGHT COLECTOMY WITH ROBOTICS;  Surgeon: Vickie Israel MD;  [facility];  Service: Surgical Oncology    IR BIOPSY LIVER MASS  06/27/2023    IR CRYOABLATION  6/3/2024    IR GASTROSTOMY (G) TUBE CHECK/CHANGE/REINSERTION/UPSIZE  4/9/2025    IR GASTROSTOMY TUBE PLACEMENT  10/2/2024    IR PORT CHECK  01/03/2024    IR PORT PLACEMENT  07/28/2023    IR PORT STRIPPING  01/09/2024    LAPAROTOMY N/A 3/12/2024    Procedure: LAPAROTOMY EXPLORATORY W/ ROBOTICS;  Surgeon: Vickie Israel MD;  [facility];   Service: Surgical Oncology    IL BIOPSY NASOPHARYNX VISIBLE LESION SIMPLE N/A 10/9/2024    Procedure: NASOPHARYNGOSCOPY with biospy;  Surgeon: Juanito Matthew MD;  Location: [facility];  Service: ENT    IL [facility] INCL FLUOR GDNCE DX W/CELL WASHG SPX N/A 2023    Procedure: BRONCHOSCOPY;  Surgeon: David Chapa MD;  [facility];  Service: ENT    IL LARYNGOSCOPY W/BIOPSY MICROSCOPE/TELESCOPE N/A 2023    Procedure: MICRODIRECT LARYNGOSCOPY WITH BIOPSY;  Surgeon: David Chapa MD;  [facility];  Service: ENT    IL LARYNGOSCOPY W/WO TRACHEOSCOPY DX EXCEPT  N/A 10/9/2024    Procedure: DIRECT LARYNGOSCOPY;  Surgeon: Juanito Matthew MD;  [facility];  Service: ENT    IL MASTECTOMY PARTIAL W/AXILLARY LYMPHADENECTOMY Right 2018    Procedure: ULTRASOUND LOCALIZED PARTIAL MASTECTOMY W/SENTINEL NODE BIOPSY POSS AXILLARY DISSECTION;  Surgeon: Zahida Coronado MD;  Location: [facility];  Service: General    IL TONSILLECTOMY PRIMARY/SECONDARY AGE 12/> N/A 10/9/2024    Procedure: TONSILLECTOMY;  Surgeon: Juanito Matthew MD;  [facility];  Service: ENT    TUBAL LIGATION      US GUIDED BREAST BIOPSY RIGHT COMPLETE Right 2018    US GUIDED INJECTION FOR RESEARCH STUDY  2018    US GUIDED INJECTION FOR RESEARCH STUDY  2018    US GUIDED INJECTION FOR RESEARCH STUDY  2019    US GUIDED LYMPH NODE BIOPSY RIGHT  2023

## 2025-05-30 ENCOUNTER — TELEPHONE (OUTPATIENT)
Age: 70
End: 2025-05-30

## 2025-06-02 ENCOUNTER — HOSPITAL ENCOUNTER (OUTPATIENT)
Dept: INFUSION CENTER | Facility: CLINIC | Age: 70
Discharge: HOME | End: 2025-06-02
Attending: INTERNAL MEDICINE
Payer: MEDICARE

## 2025-06-02 VITALS
TEMPERATURE: 97.7 F | HEART RATE: 97 BPM | RESPIRATION RATE: 20 BRPM | DIASTOLIC BLOOD PRESSURE: 78 MMHG | SYSTOLIC BLOOD PRESSURE: 113 MMHG

## 2025-06-02 DIAGNOSIS — E86.0 DEHYDRATION: Primary | ICD-10-CM

## 2025-06-02 LAB
ALBUMIN SERPL BCG-MCNC: 3.6 G/DL (ref 3.5–5)
ALP SERPL-CCNC: 129 U/L (ref 34–104)
ALT SERPL W P-5'-P-CCNC: 15 U/L (ref 7–52)
ANION GAP SERPL CALCULATED.3IONS-SCNC: 7 MMOL/L (ref 4–13)
AST SERPL W P-5'-P-CCNC: 43 U/L (ref 13–39)
BASOPHILS # BLD AUTO: 0.04 THOUSANDS/ÂΜL (ref 0–0.1)
BASOPHILS NFR BLD AUTO: 0 % (ref 0–1)
BILIRUB SERPL-MCNC: 0.48 MG/DL (ref 0.2–1)
BUN SERPL-MCNC: 28 MG/DL (ref 5–25)
CALCIUM SERPL-MCNC: 9.7 MG/DL (ref 8.4–10.2)
CHLORIDE SERPL-SCNC: 100 MMOL/L (ref 96–108)
CO2 SERPL-SCNC: 30 MMOL/L (ref 21–32)
CREAT SERPL-MCNC: 1.12 MG/DL (ref 0.6–1.3)
EOSINOPHIL # BLD AUTO: 0.16 THOUSAND/ÂΜL (ref 0–0.61)
EOSINOPHIL NFR BLD AUTO: 2 % (ref 0–6)
ERYTHROCYTE [DISTWIDTH] IN BLOOD BY AUTOMATED COUNT: 14.3 % (ref 11.6–15.1)
GFR SERPL CREATININE-BSD FRML MDRD: 50 ML/MIN/1.73SQ M
GLUCOSE SERPL-MCNC: 103 MG/DL (ref 65–140)
HCT VFR BLD AUTO: 30.2 % (ref 34.8–46.1)
HGB BLD-MCNC: 9.9 G/DL (ref 11.5–15.4)
IMM GRANULOCYTES # BLD AUTO: 0.03 THOUSAND/UL (ref 0–0.2)
IMM GRANULOCYTES NFR BLD AUTO: 0 % (ref 0–2)
LYMPHOCYTES # BLD AUTO: 0.82 THOUSANDS/ÂΜL (ref 0.6–4.47)
LYMPHOCYTES NFR BLD AUTO: 8 % (ref 14–44)
MAGNESIUM SERPL-MCNC: 2.1 MG/DL (ref 1.9–2.7)
MCH RBC QN AUTO: 31.4 PG (ref 26.8–34.3)
MCHC RBC AUTO-ENTMCNC: 32.8 G/DL (ref 31.4–37.4)
MCV RBC AUTO: 96 FL (ref 82–98)
MONOCYTES # BLD AUTO: 1.04 THOUSAND/ÂΜL (ref 0.17–1.22)
MONOCYTES NFR BLD AUTO: 11 % (ref 4–12)
NEUTROPHILS # BLD AUTO: 7.73 THOUSANDS/ÂΜL (ref 1.85–7.62)
NEUTS SEG NFR BLD AUTO: 79 % (ref 43–75)
NRBC BLD AUTO-RTO: 0 /100 WBCS
PLATELET # BLD AUTO: 248 THOUSANDS/UL (ref 149–390)
PMV BLD AUTO: 11 FL (ref 8.9–12.7)
POTASSIUM SERPL-SCNC: 4 MMOL/L (ref 3.5–5.3)
PROT SERPL-MCNC: 7.7 G/DL (ref 6.4–8.4)
RBC # BLD AUTO: 3.15 MILLION/UL (ref 3.81–5.12)
SODIUM SERPL-SCNC: 137 MMOL/L (ref 135–147)
WBC # BLD AUTO: 9.82 THOUSAND/UL (ref 4.31–10.16)

## 2025-06-02 PROCEDURE — 85025 COMPLETE CBC W/AUTO DIFF WBC: CPT | Performed by: INTERNAL MEDICINE

## 2025-06-02 PROCEDURE — 96360 HYDRATION IV INFUSION INIT: CPT

## 2025-06-02 PROCEDURE — 83735 ASSAY OF MAGNESIUM: CPT | Performed by: INTERNAL MEDICINE

## 2025-06-02 PROCEDURE — 80053 COMPREHEN METABOLIC PANEL: CPT | Performed by: INTERNAL MEDICINE

## 2025-06-02 RX ADMIN — SODIUM CHLORIDE 1000 ML: 0.9 INJECTION, SOLUTION INTRAVENOUS at 14:25

## 2025-06-02 NOTE — PROGRESS NOTES
Patient presents for hydration today and is without complaints. Patient tolerated infusion without incident. Declines AVS. Aware of next appointment on 6/4 @ 1130am.

## 2025-06-03 ENCOUNTER — OFFICE VISIT (OUTPATIENT)
Dept: SPEECH THERAPY | Facility: CLINIC | Age: 70
End: 2025-06-03
Attending: INTERNAL MEDICINE
Payer: MEDICARE

## 2025-06-03 DIAGNOSIS — R13.12 DYSPHAGIA, OROPHARYNGEAL PHASE: Primary | ICD-10-CM

## 2025-06-03 DIAGNOSIS — Z90.89 STATUS POST TONSILLECTOMY: ICD-10-CM

## 2025-06-03 DIAGNOSIS — C09.9 RIGHT TONSILLAR SQUAMOUS CELL CARCINOMA (HCC): ICD-10-CM

## 2025-06-03 PROCEDURE — 92526 ORAL FUNCTION THERAPY: CPT

## 2025-06-03 NOTE — PROGRESS NOTES
"Daily Speech Treatment Note    Today's date: 6/3/2025  Patient’s name: Maira Moura  : 1955  MRN: [id number]  Safety measures: HTN, CVA, GERD, active CA, s/p PEG tube placement, aspiration precautions  Current recommended diet: PEG tube for primary nutrition/hydration; mechanical soft with thin liquids P.O. as tolerated   Referring provider: Harika Medina DO Encounter Diagnosis     ICD-10-CM    1. Dysphagia, oropharyngeal phase  R13.12       2. Status post tonsillectomy  Z90.89       3. 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 \"They said I need to strengthen the back of my tongue\"    Short-term goals:  Patient will receive a videofluoroscopic swallow study (VFSS) to assess her current swallowing function to r/o penetration or aspiration, to determine the safest, least restrictive diet, and to recommended the appropriate rehabilitation exercises (to be achieved in 2-3 weeks).  Completed 25  Oral stage: Mild  WNL for lip closure, slow/careful/frontal/adequate mastication w/ min trial solid. Reduced  bolus formation and control w/ liquids. Improved w/ cue for smaller sips and oral prep set. Transfer was adequate. Trace lingual residue. (+ Posterior lingual residue)      Pharyngeal stage:Mod  WNL for prompt swallow. Trace column of contrast between soft palate and PPW (1). Material retropulsed from the tongue base/valleculae inconsistently. ? TB intermittently moving more superior instead of posterior. Partial laryngeal elevation (1). Partial anterior " hyoid excursion (1). Partial or absent epiglottic inversion (1,2), Incomplete laryngeal vestibular closure (1). Diminished pharyngeal constriction (1). Complete passage through UES/UES opening (0).  Reduced tongue base retraction/wide column of contrast (3). 1/4th cookie bolus remained at the BOT and multiple swallows were needed to clear. Min chin down and head rotation b/l did not appear effective in clearing. + Transient penetration, trace penetration, and/or aspiration of thin. Aspiration was often 2* spill of retention prior to the secondary swallow. Reduced or eliminated w/ smaller sip and oral prep set.  Cough response was inconsistent. Pt was cued to cough. Appeared to be suppressing her cough x 2. Educated that if she felt the need to cough she should cough. Retention was primarily BOT/Vallecular. Intermittent mild PPW and pyriform retention that cleared w/ secondary swallow. No penetration or aspiration w/ nectar thick, puree, or min solid trial (1/4 cookie).     Per gross esophageal screen: distal stasis that cleared w/ additional cued swallow.         Strategy Trialed y/n  Result +/-   Oral prep set                                      Y                       +  Secondary/multiple swallows Y +   Effortful/hard swallow Y ?   Alternation w/ liquids N     Chin down/neck flexion Y -   Volitional cough/throat clear Y +/-   Head turn/rotation Y Possibly to R w/ liquds though sip size was also smaller   Breath hold/cough N     Head tilt N     Other: Smaller sip size                       Y                      +     Recommendations:  Diet:PEG for primary. Purees, (trial small bites soft/moist foods), thin water, other liquids nectar (no aspiration of nectar today). May want to avoid or limit mixed textures for now.   Meds:PEG  Strategies:Small single sips, oral prep set, multiple swallows, do not suppress cough, periodic volitional cough  Frequent/thorough oral care  Upright position  F/u ST tx: Yes per  treating slp  PRN Supervision  Aspiration Precautions  Reflux Precautions  Consider consult with: nutrition following  Results reviewed with: pt  Repeat MBS as necessary  If a dedicated assessment of the esophagus is desired:  Consult w/ SLP prior to doing any additional barium studies if the pt aspirated during this study. Consider esophagram/routine barium swallow w/ barium tablet administration, FL Upper GI/UGI, or EGD (Options may be limited if pt cannot stand/maneuver for barium studies).     Patient will tolerate P.O. trials of her recommended diet with the implementation of safe swallowing strategies without overt s/sx of penetration and/or aspiration during skilled therapy sessions in this certification period (to be achieved in 4-6 weeks).    Traditional VitalStim     Channel(s) used: 1, 2   Placement: 2b- NEW PLACEMENT   Duty Cycle: 57/1 s   Frequency: 80 pulses per second   Phase Duration: 300 microseconds   Treatment Duration: 38 minutes   Min mA level: 4.5  mA   Max mA level: 8.0  mA      Patient tolerated NMES in conjunction with pharyngeal/laryngeal strengthening exercises and P.O. intake. (The patient received instruction on the potential benefits from NMES treatment, including neuromuscular re-education and muscle strengthening.) Skin condition post-NMES: intact.      Patient consumed the following during today’s session: yogurt with diced peaches and water (thin liquid) via side of cup -- NMES remained on during pharyngeal strengthening exercises (see below).     Oral containment, mastication, bolus formation/control, AP transfer, and swallow initiation were judged to be WFL. Reduced hyolaryngeal elevation and excursion noted upon palpation. No oral cavity residue was observed. Patient without any c/o globus sensation. Patient demonstrated no throat clears taking in PO. No other overt s/sx of penetration/aspiration noted during today's session.     Patient will independently complete pharyngeal  strengthening exercises (e.g., Effortful swallow, Mendelsohn, Evelia) daily to facilitate increased pharyngeal strength and coordination (to be achieved in 4-6 weeks).  -Patient completed the following pharyngeal strengthening exercises during today's session:  *Effortful swallow: 1 sets of 10 reps  *Mendelsohn: 1 set of 10 reps  *Evelia: 2 sets of 10 reps    Plan:  -Patient was provided with home exercises/activities to target goals in plan of care at the end of today's session.  -Continue with current plan of care.

## 2025-06-04 ENCOUNTER — HOSPITAL ENCOUNTER (OUTPATIENT)
Dept: INFUSION CENTER | Facility: CLINIC | Age: 70
Discharge: HOME | End: 2025-06-04
Attending: INTERNAL MEDICINE
Payer: MEDICARE

## 2025-06-04 DIAGNOSIS — E86.0 DEHYDRATION: Primary | ICD-10-CM

## 2025-06-04 LAB
ALBUMIN SERPL BCG-MCNC: 3.6 G/DL (ref 3.5–5)
ALP SERPL-CCNC: 124 U/L (ref 34–104)
ALT SERPL W P-5'-P-CCNC: 17 U/L (ref 7–52)
ANION GAP SERPL CALCULATED.3IONS-SCNC: 7 MMOL/L (ref 4–13)
AST SERPL W P-5'-P-CCNC: 42 U/L (ref 13–39)
BASOPHILS # BLD AUTO: 0.04 THOUSANDS/ÂΜL (ref 0–0.1)
BASOPHILS NFR BLD AUTO: 1 % (ref 0–1)
BILIRUB SERPL-MCNC: 0.34 MG/DL (ref 0.2–1)
BUN SERPL-MCNC: 28 MG/DL (ref 5–25)
CALCIUM SERPL-MCNC: 9.6 MG/DL (ref 8.4–10.2)
CHLORIDE SERPL-SCNC: 100 MMOL/L (ref 96–108)
CO2 SERPL-SCNC: 29 MMOL/L (ref 21–32)
CREAT SERPL-MCNC: 1.02 MG/DL (ref 0.6–1.3)
EOSINOPHIL # BLD AUTO: 0.19 THOUSAND/ÂΜL (ref 0–0.61)
EOSINOPHIL NFR BLD AUTO: 2 % (ref 0–6)
ERYTHROCYTE [DISTWIDTH] IN BLOOD BY AUTOMATED COUNT: 14.2 % (ref 11.6–15.1)
GFR SERPL CREATININE-BSD FRML MDRD: 56 ML/MIN/1.73SQ M
GLUCOSE SERPL-MCNC: 101 MG/DL (ref 65–140)
HCT VFR BLD AUTO: 27.7 % (ref 34.8–46.1)
HGB BLD-MCNC: 9.1 G/DL (ref 11.5–15.4)
IMM GRANULOCYTES # BLD AUTO: 0.02 THOUSAND/UL (ref 0–0.2)
IMM GRANULOCYTES NFR BLD AUTO: 0 % (ref 0–2)
LYMPHOCYTES # BLD AUTO: 0.8 THOUSANDS/ÂΜL (ref 0.6–4.47)
LYMPHOCYTES NFR BLD AUTO: 10 % (ref 14–44)
MAGNESIUM SERPL-MCNC: 2.1 MG/DL (ref 1.9–2.7)
MCH RBC QN AUTO: 31.7 PG (ref 26.8–34.3)
MCHC RBC AUTO-ENTMCNC: 32.9 G/DL (ref 31.4–37.4)
MCV RBC AUTO: 97 FL (ref 82–98)
MONOCYTES # BLD AUTO: 0.95 THOUSAND/ÂΜL (ref 0.17–1.22)
MONOCYTES NFR BLD AUTO: 12 % (ref 4–12)
NEUTROPHILS # BLD AUTO: 5.85 THOUSANDS/ÂΜL (ref 1.85–7.62)
NEUTS SEG NFR BLD AUTO: 75 % (ref 43–75)
NRBC BLD AUTO-RTO: 0 /100 WBCS
PLATELET # BLD AUTO: 245 THOUSANDS/UL (ref 149–390)
PMV BLD AUTO: 11.5 FL (ref 8.9–12.7)
POTASSIUM SERPL-SCNC: 4 MMOL/L (ref 3.5–5.3)
PROT SERPL-MCNC: 7.6 G/DL (ref 6.4–8.4)
RBC # BLD AUTO: 2.87 MILLION/UL (ref 3.81–5.12)
SODIUM SERPL-SCNC: 136 MMOL/L (ref 135–147)
WBC # BLD AUTO: 7.85 THOUSAND/UL (ref 4.31–10.16)

## 2025-06-04 PROCEDURE — 85025 COMPLETE CBC W/AUTO DIFF WBC: CPT | Performed by: INTERNAL MEDICINE

## 2025-06-04 PROCEDURE — 83735 ASSAY OF MAGNESIUM: CPT | Performed by: INTERNAL MEDICINE

## 2025-06-04 PROCEDURE — 96360 HYDRATION IV INFUSION INIT: CPT

## 2025-06-04 PROCEDURE — 80053 COMPREHEN METABOLIC PANEL: CPT | Performed by: INTERNAL MEDICINE

## 2025-06-04 RX ADMIN — SODIUM CHLORIDE 1000 ML: 0.9 INJECTION, SOLUTION INTRAVENOUS at 11:32

## 2025-06-04 NOTE — PROGRESS NOTES
Pt presents for nss x1L. No new meds or concerns. Pt tolerated treatment without adverse reaction. Future appointments discussed, confirmed with patient for 6/9/25 1130. AVS declined.

## 2025-06-05 ENCOUNTER — OFFICE VISIT (OUTPATIENT)
Dept: SPEECH THERAPY | Facility: CLINIC | Age: 70
End: 2025-06-05
Attending: INTERNAL MEDICINE
Payer: MEDICARE

## 2025-06-05 DIAGNOSIS — C09.9 RIGHT TONSILLAR SQUAMOUS CELL CARCINOMA (HCC): ICD-10-CM

## 2025-06-05 DIAGNOSIS — R13.12 DYSPHAGIA, OROPHARYNGEAL PHASE: Primary | ICD-10-CM

## 2025-06-05 DIAGNOSIS — Z90.89 STATUS POST TONSILLECTOMY: ICD-10-CM

## 2025-06-05 PROCEDURE — 92526 ORAL FUNCTION THERAPY: CPT

## 2025-06-05 NOTE — PROGRESS NOTES
Daily Speech Treatment Note    Today's date: 2025  Patient’s name: Maira Moura  : 1955  MRN: [id number]  Safety measures: HTN, CVA, GERD, active CA, s/p PEG tube placement, aspiration precautions  Current recommended diet: PEG tube for primary nutrition/hydration; mechanical soft with thin liquids P.O. as tolerated   Referring provider: Harika Medina DO Encounter Diagnosis     ICD-10-CM    1. Dysphagia, oropharyngeal phase  R13.12       2. Status post tonsillectomy  Z90.89       3. 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         Visit Tracking:  POC   Expires Auth Expiration Date ST Visit Limit   2025 or 10th visit 2025 [facility]              Visit/Unit Tracking:  Auth Status Date 05/01/25 05/06/25 05/08/25 05/13/25 05/15/25 05/20/25 06/03/25  06/05/25   Not required Used 1 2 3 4 5 6 7  8     Remaining 9 8 7 6 5 4 3  2      Subjective/Behavioral:  -Patient reported she was doing well    Objective/Assessment:    Short-term goals:  Patient will receive a videofluoroscopic swallow study (VFSS) to assess her current swallowing function to r/o penetration or aspiration, to determine the safest, least restrictive diet, and to recommended the appropriate rehabilitation exercises (to be achieved in 2-3 weeks).  Completed 25    Patient will tolerate P.O. trials of her recommended diet with the implementation of safe swallowing strategies without overt s/sx of penetration and/or aspiration during skilled therapy sessions in this certification period (to be achieved in 4-6 weeks).    Traditional VitalStim     Channel(s) used: 1, 2   Placement: 2b- NEW PLACEMENT   Duty Cycle: 57/1 s   Frequency: 80 pulses per second   Phase Duration: 300 microseconds   Treatment Duration: 38 minutes   Min mA level: 7.0  mA   Max mA level: 8.0  mA      Patient tolerated NMES in conjunction with pharyngeal/laryngeal strengthening exercises and P.O. intake. (The patient received instruction on the  potential benefits from NMES treatment, including neuromuscular re-education and muscle strengthening.) Skin condition post-NMES: intact.      Patient consumed the following during today’s session: yogurt with diced peaches and water (thin liquid) via side of cup -- NMES remained on during pharyngeal strengthening exercises (see below).     Oral containment, mastication, bolus formation/control, AP transfer, and swallow initiation were judged to be WFL. Reduced hyolaryngeal elevation and excursion noted upon palpation. No oral cavity residue was observed. Patient without any c/o globus sensation. Patient demonstrated no throat clears taking in PO. No other overt s/sx of penetration/aspiration noted during today's session.     Patient will independently complete pharyngeal strengthening exercises (e.g., Effortful swallow, Mendelsohn, Evelia) daily to facilitate increased pharyngeal strength and coordination (to be achieved in 4-6 weeks).  -Patient completed the following pharyngeal strengthening exercises during today's session:  *Effortful swallow: 1 sets of 10 reps  *Mendelsohn: 1 set of 10 reps  *Evelia: 2 sets of 10 reps    Plan:  -Patient was provided with home exercises/activities to target goals in plan of care at the end of today's session.  -Continue with current plan of care.

## 2025-06-08 NOTE — PROGRESS NOTES
Daily Speech Treatment Note    Today's date: 2025   Patient’s name: Maira Moura  : 1955  MRN: [id number]  Safety measures: HTN, CVA, GERD, active CA, s/p PEG tube placement, aspiration precautions  Current recommended diet: PEG tube for primary nutrition/hydration; mechanical soft with thin liquids P.O. as tolerated   Referring provider: Harika Medina DO Encounter Diagnosis     ICD-10-CM    1. Dysphagia, oropharyngeal phase  R13.12       2. Status post tonsillectomy  Z90.89       3. 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    Objective/Assessment:    Short-term goals:  Patient will receive a videofluoroscopic swallow study (VFSS) to assess her current swallowing function to r/o penetration or aspiration, to determine the safest, least restrictive diet, and to recommended the appropriate rehabilitation exercises (to be achieved in 2-3 weeks).  Completed 25    Patient will tolerate P.O. trials of her recommended diet with the implementation of safe swallowing strategies without overt s/sx of penetration and/or aspiration during skilled therapy sessions in this certification period (to be achieved in 4-6 weeks).    Traditional VitalStim     Channel(s) used: 1, 2   Placement: 2b   Duty Cycle: 57/1 s   Frequency: 80 pulses per second   Phase Duration: 300 microseconds   Treatment Duration: 39 minutes   Min mA level: 5.0 mA   Max mA level: 8.0 mA      Patient tolerated NMES in  conjunction with pharyngeal/laryngeal strengthening exercises and P.O. intake. (The patient received instruction on the potential benefits from NMES treatment, including neuromuscular re-education and muscle strengthening.) Skin condition post-NMES: intact.      Patient consumed the following during today’s session: peach yogurt with small pieces of whole peaches and water (thin liquid) via side of cup -- NMES remained on during pharyngeal strengthening exercises (see below).     Oral containment, mastication, bolus formation/control, AP transfer, and swallow initiation were judged to be WFL. Reduced hyolaryngeal elevation and excursion noted upon palpation. No oral cavity residue was observed. Patient without any c/o globus sensation. Patient demonstrated cough x3 with yogurt and x1 with TL. Voice returned to baseline. No other overt s/sx of penetration/aspiration noted during today's session.     Patient will independently complete pharyngeal strengthening exercises (e.g., Effortful swallow, Mendelsohn, Evelia) daily to facilitate increased pharyngeal strength and coordination (to be achieved in 4-6 weeks).  -Patient completed the following pharyngeal strengthening exercises during today's session:  *Effortful swallow: 1 set of 10 reps  *Mendelsohn: 1 set of 10 reps  *Evelia: 1 set of 10 reps      [employer] Pro Pmax Data Tracker Grid       01/09/25 01/14/25 01/23/25 03/20/25   03/25/25  03/27/25 04/03/25 04/08/25 04/15/25 04/17/25 06/12/25   Tongue tip (GOAL = 56) 50 49 49 70  58  61  63 58 58 65 61   Tongue back (GOAL = 50) 47 54 61 69  62 64 69 63 63 70 66         Plan:  -Patient was provided with home exercises/activities to target goals in plan of care at the end of today's session.  -Continue with current plan of care.

## 2025-06-09 ENCOUNTER — HOSPITAL ENCOUNTER (OUTPATIENT)
Dept: INFUSION CENTER | Facility: CLINIC | Age: 70
Discharge: HOME | End: 2025-06-09
Attending: INTERNAL MEDICINE
Payer: MEDICARE

## 2025-06-09 ENCOUNTER — PATIENT OUTREACH (OUTPATIENT)
Dept: HEMATOLOGY ONCOLOGY | Facility: CLINIC | Age: 70
End: 2025-06-09

## 2025-06-09 VITALS
TEMPERATURE: 97.6 F | HEART RATE: 101 BPM | DIASTOLIC BLOOD PRESSURE: 73 MMHG | RESPIRATION RATE: 20 BRPM | SYSTOLIC BLOOD PRESSURE: 104 MMHG

## 2025-06-09 DIAGNOSIS — E86.0 DEHYDRATION: Primary | ICD-10-CM

## 2025-06-09 LAB
ALBUMIN SERPL BCG-MCNC: 3.7 G/DL (ref 3.5–5)
ALP SERPL-CCNC: 122 U/L (ref 34–104)
ALT SERPL W P-5'-P-CCNC: 12 U/L (ref 7–52)
ANION GAP SERPL CALCULATED.3IONS-SCNC: 7 MMOL/L (ref 4–13)
AST SERPL W P-5'-P-CCNC: 37 U/L (ref 13–39)
BASOPHILS # BLD AUTO: 0.05 THOUSANDS/ÂΜL (ref 0–0.1)
BASOPHILS NFR BLD AUTO: 1 % (ref 0–1)
BILIRUB SERPL-MCNC: 0.37 MG/DL (ref 0.2–1)
BUN SERPL-MCNC: 24 MG/DL (ref 5–25)
CALCIUM SERPL-MCNC: 9.6 MG/DL (ref 8.4–10.2)
CHLORIDE SERPL-SCNC: 100 MMOL/L (ref 96–108)
CO2 SERPL-SCNC: 29 MMOL/L (ref 21–32)
CREAT SERPL-MCNC: 1.2 MG/DL (ref 0.6–1.3)
EOSINOPHIL # BLD AUTO: 0.22 THOUSAND/ÂΜL (ref 0–0.61)
EOSINOPHIL NFR BLD AUTO: 3 % (ref 0–6)
ERYTHROCYTE [DISTWIDTH] IN BLOOD BY AUTOMATED COUNT: 13.8 % (ref 11.6–15.1)
GFR SERPL CREATININE-BSD FRML MDRD: 46 ML/MIN/1.73SQ M
GLUCOSE SERPL-MCNC: 86 MG/DL (ref 65–140)
HCT VFR BLD AUTO: 29.8 % (ref 34.8–46.1)
HGB BLD-MCNC: 9.8 G/DL (ref 11.5–15.4)
IMM GRANULOCYTES # BLD AUTO: 0.02 THOUSAND/UL (ref 0–0.2)
IMM GRANULOCYTES NFR BLD AUTO: 0 % (ref 0–2)
LYMPHOCYTES # BLD AUTO: 0.67 THOUSANDS/ÂΜL (ref 0.6–4.47)
LYMPHOCYTES NFR BLD AUTO: 10 % (ref 14–44)
MAGNESIUM SERPL-MCNC: 2.2 MG/DL (ref 1.9–2.7)
MCH RBC QN AUTO: 31.9 PG (ref 26.8–34.3)
MCHC RBC AUTO-ENTMCNC: 32.9 G/DL (ref 31.4–37.4)
MCV RBC AUTO: 97 FL (ref 82–98)
MONOCYTES # BLD AUTO: 0.73 THOUSAND/ÂΜL (ref 0.17–1.22)
MONOCYTES NFR BLD AUTO: 11 % (ref 4–12)
NEUTROPHILS # BLD AUTO: 4.98 THOUSANDS/ÂΜL (ref 1.85–7.62)
NEUTS SEG NFR BLD AUTO: 75 % (ref 43–75)
NRBC BLD AUTO-RTO: 0 /100 WBCS
PLATELET # BLD AUTO: 333 THOUSANDS/UL (ref 149–390)
PMV BLD AUTO: 11.4 FL (ref 8.9–12.7)
POTASSIUM SERPL-SCNC: 4.3 MMOL/L (ref 3.5–5.3)
PROT SERPL-MCNC: 8 G/DL (ref 6.4–8.4)
RBC # BLD AUTO: 3.07 MILLION/UL (ref 3.81–5.12)
SODIUM SERPL-SCNC: 136 MMOL/L (ref 135–147)
WBC # BLD AUTO: 6.67 THOUSAND/UL (ref 4.31–10.16)

## 2025-06-09 PROCEDURE — 96360 HYDRATION IV INFUSION INIT: CPT

## 2025-06-09 PROCEDURE — 85025 COMPLETE CBC W/AUTO DIFF WBC: CPT | Performed by: INTERNAL MEDICINE

## 2025-06-09 PROCEDURE — 83735 ASSAY OF MAGNESIUM: CPT | Performed by: INTERNAL MEDICINE

## 2025-06-09 PROCEDURE — 80053 COMPREHEN METABOLIC PANEL: CPT | Performed by: INTERNAL MEDICINE

## 2025-06-09 RX ADMIN — SODIUM CHLORIDE 1000 ML: 0.9 INJECTION, SOLUTION INTRAVENOUS at 12:00

## 2025-06-09 NOTE — PLAN OF CARE
Problem: Potential for Falls  Goal: Patient will remain free of falls  Description: INTERVENTIONS:  - Educate patient/family on patient safety including physical limitations  - Instruct patient to call for assistance with activity   - Consider consulting OT/PT to assist with strengthening/mobility based on AM PAC & JH-HLM score  - Consult OT/PT to assist with strengthening/mobility   - Keep Call bell within reach  - Keep bed low and locked with side rails adjusted as appropriate  - Keep care items and personal belongings within reach  - Initiate and maintain comfort rounds  - Consider moving patient to room near nurses station  Outcome: Completed

## 2025-06-09 NOTE — PROGRESS NOTES
Pt called stating she has an 11:30am appointment today, and she has not gotten a call from  [facility].  I confirmed via round trip that she is scheduled today for transportation. She understood, and was thankful for the assistance

## 2025-06-09 NOTE — PROGRESS NOTES
Patient tolerated her central labs and hydration well without adverse affects. She is aware to return on 6/11/25.

## 2025-06-10 ENCOUNTER — APPOINTMENT (OUTPATIENT)
Dept: SPEECH THERAPY | Facility: CLINIC | Age: 70
End: 2025-06-10
Attending: INTERNAL MEDICINE
Payer: MEDICARE

## 2025-06-11 ENCOUNTER — HOSPITAL ENCOUNTER (OUTPATIENT)
Dept: INFUSION CENTER | Facility: CLINIC | Age: 70
Discharge: HOME | End: 2025-06-11
Attending: INTERNAL MEDICINE
Payer: MEDICARE

## 2025-06-11 ENCOUNTER — NUTRITION (OUTPATIENT)
Dept: NUTRITION | Facility: CLINIC | Age: 70
End: 2025-06-11

## 2025-06-11 VITALS
SYSTOLIC BLOOD PRESSURE: 112 MMHG | WEIGHT: 155.65 LBS | DIASTOLIC BLOOD PRESSURE: 79 MMHG | RESPIRATION RATE: 18 BRPM | HEIGHT: 65 IN | TEMPERATURE: 97.4 F | BODY MASS INDEX: 25.93 KG/M2 | HEART RATE: 105 BPM

## 2025-06-11 DIAGNOSIS — C18.9 METASTATIC COLON CANCER TO LIVER (HCC): ICD-10-CM

## 2025-06-11 DIAGNOSIS — Z71.3 NUTRITIONAL COUNSELING: Primary | ICD-10-CM

## 2025-06-11 DIAGNOSIS — C78.7 METASTATIC COLON CANCER TO LIVER (HCC): ICD-10-CM

## 2025-06-11 DIAGNOSIS — E86.0 DEHYDRATION: Primary | ICD-10-CM

## 2025-06-11 LAB
ALBUMIN SERPL BCG-MCNC: 3.8 G/DL (ref 3.5–5)
ALP SERPL-CCNC: 125 U/L (ref 34–104)
ALT SERPL W P-5'-P-CCNC: 13 U/L (ref 7–52)
ANION GAP SERPL CALCULATED.3IONS-SCNC: 6 MMOL/L (ref 4–13)
AST SERPL W P-5'-P-CCNC: 39 U/L (ref 13–39)
BACTERIA UR QL AUTO: ABNORMAL /HPF
BILIRUB SERPL-MCNC: 0.3 MG/DL (ref 0.2–1)
BILIRUB UR QL STRIP: NEGATIVE
BUN SERPL-MCNC: 24 MG/DL (ref 5–25)
CALCIUM SERPL-MCNC: 9.7 MG/DL (ref 8.4–10.2)
CHLORIDE SERPL-SCNC: 102 MMOL/L (ref 96–108)
CLARITY UR: CLEAR
CO2 SERPL-SCNC: 29 MMOL/L (ref 21–32)
COLOR UR: YELLOW
CREAT SERPL-MCNC: 1.18 MG/DL (ref 0.6–1.3)
GFR SERPL CREATININE-BSD FRML MDRD: 47 ML/MIN/1.73SQ M
GLUCOSE SERPL-MCNC: 100 MG/DL (ref 65–140)
GLUCOSE UR STRIP-MCNC: NEGATIVE MG/DL
HGB UR QL STRIP.AUTO: NEGATIVE
HYALINE CASTS #/AREA URNS LPF: ABNORMAL /LPF
KETONES UR STRIP-MCNC: NEGATIVE MG/DL
LEUKOCYTE ESTERASE UR QL STRIP: ABNORMAL
MAGNESIUM SERPL-MCNC: 2.1 MG/DL (ref 1.9–2.7)
MUCOUS THREADS UR QL AUTO: ABNORMAL
NITRITE UR QL STRIP: NEGATIVE
NON-SQ EPI CELLS URNS QL MICRO: ABNORMAL /HPF
PH UR STRIP.AUTO: 5.5 [PH]
POTASSIUM SERPL-SCNC: 3.9 MMOL/L (ref 3.5–5.3)
PROT SERPL-MCNC: 7.8 G/DL (ref 6.4–8.4)
PROT UR STRIP-MCNC: NEGATIVE MG/DL
RBC #/AREA URNS AUTO: ABNORMAL /HPF
RENAL EPI CELLS #/AREA URNS HPF: PRESENT /[HPF]
SODIUM SERPL-SCNC: 137 MMOL/L (ref 135–147)
SP GR UR STRIP.AUTO: 1.01 (ref 1–1.03)
TRANS CELLS #/AREA URNS HPF: PRESENT /[HPF]
UROBILINOGEN UR STRIP-ACNC: <2 MG/DL
WBC #/AREA URNS AUTO: ABNORMAL /HPF

## 2025-06-11 PROCEDURE — 83735 ASSAY OF MAGNESIUM: CPT | Performed by: INTERNAL MEDICINE

## 2025-06-11 PROCEDURE — 96361 HYDRATE IV INFUSION ADD-ON: CPT

## 2025-06-11 PROCEDURE — 96415 CHEMO IV INFUSION ADDL HR: CPT

## 2025-06-11 PROCEDURE — 96413 CHEMO IV INFUSION 1 HR: CPT

## 2025-06-11 PROCEDURE — 81001 URINALYSIS AUTO W/SCOPE: CPT | Performed by: INTERNAL MEDICINE

## 2025-06-11 PROCEDURE — 80053 COMPREHEN METABOLIC PANEL: CPT | Performed by: INTERNAL MEDICINE

## 2025-06-11 RX ORDER — SODIUM CHLORIDE 9 MG/ML
20 INJECTION, SOLUTION INTRAVENOUS ONCE
Status: COMPLETED | OUTPATIENT
Start: 2025-06-11 | End: 2025-06-11

## 2025-06-11 RX ADMIN — BEVACIZUMAB 362.5 MG: 400 INJECTION, SOLUTION INTRAVENOUS at 13:31

## 2025-06-11 RX ADMIN — SODIUM CHLORIDE 1000 ML: 0.9 INJECTION, SOLUTION INTRAVENOUS at 13:10

## 2025-06-11 RX ADMIN — SODIUM CHLORIDE 20 ML/HR: 0.9 INJECTION, SOLUTION INTRAVENOUS at 13:40

## 2025-06-11 NOTE — PROGRESS NOTES
Outpatient Oncology Nutrition Consultation   Type of Consult: Follow Up   Care Location: [facility]    Reason for referral: Received notification by [facility]APATA on 8/21/24 that pt has triggered for oncology nutrition care (reason for referral: HNC dx and Malnutrition Screening Tool (MST) Triggers: scored a 0 indicating No recent wt loss and is not eating poorly due to a decreased appetite. 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 Will be changing tx to Avastin starting 6/11/25  Oncology History   Malignant neoplasm of right female breast (HCC)   11/23/2018 Initial Diagnosis    Malignant neoplasm of right female breast (HCC)     11/23/2018 Biopsy    Rt Breast US BX 1100 8cmfn, 4 passes 12g Marquee:  - Invasive breast carcinoma of no special type (ductal NST/invasive ductal carcinoma).  - grade 2  - %  - MI 95%  - HER2 negative     12/20/2018 Surgery    Right partial mastectomy and SLN biopsy:  Infiltrating ductal carcinoma, Grade 2/3, felt to be between 2.0 cm and 2.5 cm in greatest dimension  - No invasive tumor is seen at inked margins, but there is a focus of DCIS seen within approximately 1mm of the inked medial margin  - no LVI  - no LCIS  - 0/2 LN's  at least Stage IIA - pT2, pN0, cM0, G2.    - Dr Coronado     12/20/2018 -  Cancer Staged    Stage IIA -  "pT2, pN0, cM0, G2.       1/4/2019 Genomic Testing    Oncotype DX score: 13     2/1/2019 -  Hormone Therapy    anastrozole 1 mg daily as adjuvant endocrine therapy    - Dr Daley       2/14/2019 - 4/3/2019 Radiation    Course: C1    Plan ID Energy Fractions Dose per Fraction (cGy) Dose Correction (cGy) Total Dose Delivered (cGy) Elapsed Days   R Breast 10X/6X 25 / 25 200 0 5,000 40   R BRST BOOST 16E 5 / 5 200 0 1,000 7      Treatment dates:  C1: 2/14/2019 - 4/3/2019       Metastatic colon cancer to liver (HCC)   7/6/2023 Genetic Testing    CARIS Results:   KRAS Pathogenic Variant Exon 2  p.G12D : lack of benefit of cetuximab, panitumumab Level 2   No other actionable mutation; MSI stable; TMB low   MiFOLOXAi Results: \"Decreased Benefit of FOLFOX + Bevacizumab in first line metastatic CRC.  This patient may achieve improved results by receiving an alternative to FOLFOX, such as FOLFIRI, as their initial regimen. As an adjustment to frontline FOLFOXIRI following toxicity: This patient may achieve improved results by removing the oxaliplatin portion of their regimen\".         7/11/2023 Initial Diagnosis    Metastatic colon cancer to liver (HCC)     7/13/2023 -  Cancer Staged    Staging form: Colon and Rectum, AJCC 8th Edition  - Clinical stage from 7/13/2023: cT3, cN0, pM1 - Signed by Harika Medina DO on 9/28/2023  Total positive nodes: 0       7/31/2023 - 8/1/2024 Chemotherapy    cyanocobalamin, 1,000 mcg, Intramuscular, Every 30 days, 7 of 9 cycles  Administration: 1,000 mcg (5/9/2024), 1,000 mcg (5/24/2024), 1,000 mcg (6/20/2024), 1,000 mcg (7/3/2024), 1,000 mcg (7/18/2024), 1,000 mcg (8/1/2024)  potassium chloride, 20 mEq, Intravenous, Once, 2 of 2 cycles  Administration: 20 mEq (11/6/2023), 20 mEq (11/20/2023)  alteplase (CATHFLO), 2 mg, Intracatheter, Every 1 Minute as needed, 21 of 23 cycles  Administration: 2 mg (1/3/2024)  pegfilgrastim (NEULASTA), 6 mg, Subcutaneous, Once, 12 of 12 cycles  Administration: " 6 mg (10/25/2023), 6 mg (11/8/2023), 6 mg (11/22/2023), 6 mg (12/6/2023), 6 mg (12/20/2023), 6 mg (1/12/2024), 6 mg (1/25/2024), 6 mg (4/25/2024), 6 mg (5/9/2024), 6 mg (5/24/2024), 6 mg (6/20/2024)  fluorouracil (ADRUCIL), 895 mg, Intravenous, Once, 21 of 23 cycles  Dose modification: 320 mg/m2 (original dose 400 mg/m2, Cycle 11)  Administration: 900 mg (7/31/2023), 900 mg (8/14/2023), 900 mg (8/28/2023), 900 mg (9/11/2023), 900 mg (9/25/2023), 900 mg (10/9/2023), 900 mg (10/23/2023), 895 mg (11/6/2023), 895 mg (11/20/2023), 895 mg (12/4/2023), 700 mg (12/18/2023), 680 mg (1/10/2024), 680 mg (1/23/2024), 625 mg (4/23/2024), 625 mg (5/7/2024), 625 mg (5/22/2024), 625 mg (6/4/2024), 625 mg (6/18/2024), 625 mg (7/1/2024), 625 mg (7/16/2024), 625 mg (7/30/2024)  nivolumab (OPDIVO) IVPB, 240 mg (100 % of original dose 240 mg), Intravenous, Once, 13 of 15 cycles  Dose modification: 240 mg (original dose 240 mg, Cycle 9)  Administration: 240 mg (11/20/2023), 240 mg (12/4/2023), 240 mg (12/18/2023), 240 mg (1/10/2024), 240 mg (1/23/2024), 240 mg (4/23/2024), 240 mg (5/7/2024), 240 mg (5/22/2024), 240 mg (6/4/2024), 240 mg (6/18/2024), 240 mg (7/1/2024), 240 mg (7/16/2024), 240 mg (7/30/2024)  leucovorin calcium IVPB, 896 mg, Intravenous, Once, 21 of 23 cycles  Administration: 900 mg (7/31/2023), 900 mg (8/14/2023), 900 mg (8/28/2023), 900 mg (9/11/2023), 900 mg (9/25/2023), 900 mg (10/9/2023), 900 mg (10/23/2023), 900 mg (11/6/2023), 900 mg (11/20/2023), 900 mg (12/4/2023), 900 mg (12/18/2023), 850 mg (1/10/2024), 850 mg (1/23/2024), 800 mg (4/23/2024), 800 mg (5/7/2024), 800 mg (5/22/2024), 800 mg (6/4/2024), 800 mg (6/18/2024), 800 mg (7/1/2024), 800 mg (7/16/2024), 800 mg (7/30/2024)  oxaliplatin (ELOXATIN) chemo infusion, 85 mg/m2 = 190.4 mg, Intravenous, Once, 12 of 12 cycles  Dose modification: 68 mg/m2 (original dose 85 mg/m2, Cycle 11, Reason: Other (Must fill in a comment), Comment: increased  fatigue)  Administration: 190.4 mg (7/31/2023), 190.4 mg (8/14/2023), 200 mg (8/28/2023), 200 mg (9/11/2023), 200 mg (9/25/2023), 200 mg (10/9/2023), 200 mg (10/23/2023), 200 mg (11/6/2023), 200 mg (11/20/2023), 190.4 mg (12/4/2023), 150 mg (12/18/2023), 150 mg (1/10/2024)  fluorouracil (ADRUCIL) ambulatory infusion Soln, 1,200 mg/m2/day = 5,375 mg, Intravenous, Over 46 hours, 21 of 23 cycles     9/26/2023 -  Cancer Staged    Staging form: Colon and Rectum, AJCC 8th Edition  - Pathologic: pT3, pN0, cM1 - Signed by Vickie Israel MD on 4/3/2024  Total positive nodes: 0       3/12/2024 Surgery    A. Terminal ileum, appendix, right colon (right hemicolectomy):  - Adenocarcinoma (5.2cm)  - Forty-nine (49) lymph nodes negative for carcinoma (0/49)    - Acellular mucin present in one (1) lymph node   - See staging synoptic (ypT3N0)     1/8/2025 - 5/16/2025 Chemotherapy    fluorouracil (ADRUCIL), 400 mg/m2 = 770 mg, 2 of 2 cycles  Administration: 770 mg (1/8/2025), 770 mg (1/22/2025)  irinotecan (CAMPTOSAR) chemo infusion, 173 mg (50 % of original dose 180 mg/m2), 8 of 11 cycles  Dose modification: 90 mg/m2 (original dose 180 mg/m2, Cycle 1, Reason: Anticipated Tolerance, Comment: 50% dose reduction due to pre-existing diarrhea)  Administration: 180 mg (1/8/2025), 180 mg (1/22/2025), 160 mg (3/5/2025), 160 mg (3/19/2025), 160 mg (4/2/2025), 160 mg (4/16/2025), 160 mg (4/30/2025), 160 mg (5/14/2025)  leucovorin calcium IVPB, 768 mg, 2 of 2 cycles  Administration: 800 mg (1/8/2025), 800 mg (1/22/2025)  fluorouracil (ADRUCIL) ambulatory infusion Soln, 1,200 mg/m2/day = 4,610 mg, 8 of 11 cycles     6/11/2025 -  Chemotherapy    bevacizumab (AVASTIN) IVPB, 5 mg/kg = 362.5 mg (50 % of original dose 10 mg/kg), 1 of 6 cycles  Dose modification: 5 mg/kg (original dose 10 mg/kg, Cycle 1, Reason: Other (Must fill in a comment), Comment: per protocol)     Right tonsillar squamous cell carcinoma (HCC)   7/27/2023 Initial Diagnosis     Right tonsillar squamous cell carcinoma (HCC)     7/27/2023 -  Cancer Staged    Staging form: Pharynx - Oropharynx, AJCC 8th Edition  - Clinical stage from 7/27/2023: Stage III (cT1, cN3, cM0, p16+) - Signed by Evan Plummer MD on 7/27/2023  Stage prefix: Initial diagnosis       9/16/2024 - 11/13/2024 Radiation      Plan ID Energy Fractions Dose per Fraction (cGy) Dose Correction (cGy) Total Dose Delivered (cGy) Elapsed Days   Head_Neck 6X-FFF 35 / 35 200 0 7,000 58    C2: 9/16/2024 - 11/13/2024 9/17/2024 - 10/21/2024 Chemotherapy    alteplase (CATHFLO), 2 mg, Intracatheter, Every 1 Minute as needed, 6 of 6 cycles  Administration: 2 mg (10/21/2024)  CISplatin (PLATINOL) infusion, 70 mg (original dose 40 mg/m2), Intravenous, Once, 6 of 6 cycles  Dose modification: 70 mg (original dose 40 mg/m2, Cycle 1, Reason: Anticipated Tolerance), 30 mg/m2 (original dose 40 mg/m2, Cycle 4, Reason: Neuropathy, Comment: dose reduction to 30 mg/m2 due to neuropathy)  Administration: 70 mg (9/17/2024), 70 mg (9/23/2024), 70 mg (9/30/2024), 59.1 mg (10/7/2024), 59.1 mg (10/14/2024), 59.1 mg (10/21/2024)  sodium chloride, 500 mL, Intravenous, Once, 6 of 6 cycles  Administration: 500 mL (9/17/2024), 500 mL (9/17/2024), 500 mL (9/23/2024), 500 mL (9/23/2024), 500 mL (9/30/2024), 500 mL (9/30/2024), 500 mL (10/7/2024), 500 mL (10/7/2024), 500 mL (10/14/2024), 500 mL (10/14/2024), 500 mL (10/21/2024)  fosaprepitant (EMEND) IVPB, 150 mg, Intravenous, Once, 6 of 6 cycles  Administration: 150 mg (9/17/2024), 150 mg (9/23/2024), 150 mg (9/30/2024), 150 mg (10/7/2024), 150 mg (10/14/2024), 150 mg (10/21/2024)  IVPB builder, , Intravenous, Once, 1 of 1 cycle  Administration: Unknown dose (10/21/2024)       Past Medical & Surgical Hx:   Patient Active Problem List   Diagnosis    Primary hypertension    Mixed hyperlipidemia    Malignant neoplasm of right female breast (HCC)    Vitamin D deficiency    Prediabetes    Right thyroid  nodule    GERD (gastroesophageal reflux disease)    Obesity    Metastatic colon cancer to liver (HCC)    Right tonsillar squamous cell carcinoma (HCC)    Poor appetite    Nutritional anemia    Dehydration    Drug-induced constipation    Chemotherapy induced neutropenia (HCC)    Stage 3a chronic kidney disease (HCC)    Thrombocytopenia (HCC)    Neuropathy    Diastolic dysfunction    Hypokalemia    Leukemoid reaction    GOGO (acute kidney injury) (HCC)    Acute post-operative pain    At risk for constipation    At risk for delirium    Palliative care encounter    Physical deconditioning    History of CVA (cerebrovascular accident)    Chronic nasal congestion    Elevated LFTs    Vitamin B12 deficiency    Neoplastic malignant related fatigue    Sensation, choking    S/P percutaneous endoscopic gastrostomy (PEG) tube placement (HCC)    Pancytopenia due to chemotherapy (HCC)    Cancer related pain    Hypomagnesemia    Functional diarrhea    Antineoplastic chemotherapy induced anemia    Hypertensive heart and renal disease with congestive heart failure (HCC)     Past Medical History:   Diagnosis Date    Anemia     Arthritis     Body mass index (BMI) 40.0-44.9, adult (HCC) 9/22/2023    Breast cancer (HCC) 12/17/2018    Cancer (HCC)     right breast, colon, liver, right tonsil    Cervical lymphadenopathy 3/21/2023    CT neck on 3/30/2023- Large right level 2A lymphadenopathy as described above and suspicious for neoplasm.  Correlation with histopathology is recommended.  Mild asymmetric prominence of the right palatine tonsil with otherwise no definitive nodular enhancing lesions identified along the course of the aerodigestive tract.     5/26/23- FNA of this node was nonrevealing for tissue etiology, but it d    Encounter for screening for HIV 07/07/2022    Follow-up examination 04/04/2023    GERD (gastroesophageal reflux disease)     Hyperlipidemia     Hypertension     Obesity     Stroke (HCC)     TIA     Stroke (HCC)      TIA     Transaminitis 2021    Vasomotor rhinitis 2018    Refilled flonase today. Stopped taking since 2022     Past Surgical History:   Procedure Laterality Date    BREAST BIOPSY Right 2018    us guided bx cancer    BREAST SURGERY      COLONOSCOPY      ESOPHAGOSCOPY N/A 2023    Procedure: ESOPHAGOSCOPY;  Surgeon: David Chapa MD;  [facility];  Service: ENT    HEMICOLOECTOMY W/ ANASTOMOSIS Right 3/12/2024    Procedure: RIGHT COLECTOMY WITH ROBOTICS;  Surgeon: Vickie Israel MD;  [facility];  Service: Surgical Oncology    IR BIOPSY LIVER MASS  2023    IR CRYOABLATION  6/3/2024    IR GASTROSTOMY (G) TUBE CHECK/CHANGE/REINSERTION/UPSIZE  2025    IR GASTROSTOMY TUBE PLACEMENT  10/2/2024    IR PORT CHECK  2024    IR PORT PLACEMENT  2023    IR PORT STRIPPING  2024    LAPAROTOMY N/A 3/12/2024    Procedure: LAPAROTOMY EXPLORATORY W/ ROBOTICS;  Surgeon: Vickie Israel MD;  [facility];  Service: Surgical Oncology    NV BIOPSY NASOPHARYNX VISIBLE LESION SIMPLE N/A 10/9/2024    Procedure: NASOPHARYNGOSCOPY with biospy;  Surgeon: Juanito Matthew MD;  [facility];  Service: ENT    [facility] W/CELL WASHG SPX N/A 2023    Procedure: BRONCHOSCOPY;  Surgeon: David Chapa MD;  [facility];  Service: ENT    NV LARYNGOSCOPY W/BIOPSY MICROSCOPE/TELESCOPE N/A 2023    Procedure: MICRODIRECT LARYNGOSCOPY WITH BIOPSY;  Surgeon: David Chapa MD;  [facility];  Service: ENT    NV LARYNGOSCOPY W/WO TRACHEOSCOPY DX EXCEPT  N/A 10/9/2024    Procedure: DIRECT LARYNGOSCOPY;  Surgeon: Juanito Matthew MD;  [facility];  Service: ENT    NV MASTECTOMY PARTIAL W/AXILLARY LYMPHADENECTOMY Right 2018    Procedure: ULTRASOUND LOCALIZED PARTIAL MASTECTOMY W/SENTINEL NODE BIOPSY POSS AXILLARY DISSECTION;  Surgeon: Zahida Coronado MD;  Location: [facility];  Service:  General    AR TONSILLECTOMY PRIMARY/SECONDARY AGE 12/> N/A 10/9/2024    Procedure: TONSILLECTOMY;  Surgeon: Juanito Matthew MD;  [facility];  Service: ENT    TUBAL LIGATION      US GUIDED BREAST BIOPSY RIGHT COMPLETE Right 11/23/2018    US GUIDED INJECTION FOR RESEARCH STUDY  12/20/2018    US GUIDED INJECTION FOR RESEARCH STUDY  12/07/2018    US GUIDED INJECTION FOR RESEARCH STUDY  05/03/2019    US GUIDED LYMPH NODE BIOPSY RIGHT  05/26/2023       Review of Medications:   Vitamins, Supplements and Herbals: No, pt denies taking supplements    Current Outpatient Medications:     acetaminophen (TYLENOL) 160 mg/5 mL liquid, Take 20 mL (640 mg total) by mouth every 6 (six) hours as needed for mild pain for up to 10 days, Disp: 473 mL, Rfl: 3    anastrozole (ARIMIDEX) 1 mg tablet, Take 1 tablet (1 mg total) by mouth daily, Disp: 90 tablet, Rfl: 0    atorvastatin (LIPITOR) 40 mg tablet, Take 1 tablet (40 mg total) by mouth daily at bedtime, Disp: 30 tablet, Rfl: 2    cholestyramine (QUESTRAN) 4 g packet, Take 1 packet (4 g total) by mouth 3 (three) times a day with meals, Disp: 90 packet, Rfl: 3    diphenhydramine, lidocaine, Al/Mg hydroxide, simethicone (Magic Mouthwash) SUSP, Swish and swallow 10 mL every 4 (four) hours as needed for mouth pain or discomfort (Patient not taking: Reported on 1/31/2025), Disp: 480 mL, Rfl: 2    docusate sodium (COLACE) 50 mg capsule, Take 1 capsule (50 mg total) by mouth 2 (two) times a day, Disp: 90 capsule, Rfl: 1    dronabinol (MARINOL) 2.5 mg capsule, Take 1 capsule (2.5 mg total) by mouth in the morning, Disp: 5 capsule, Rfl: 0    ergocalciferol (VITAMIN D2) 50,000 units, Take 1 capsule (50,000 Units total) by mouth once a week, Disp: 90 capsule, Rfl: 0    folic acid (FOLVITE) 1 mg tablet, Take 1 tablet (1 mg total) by mouth in the morning, Disp: 90 tablet, Rfl: 3    gabapentin (NEURONTIN) 300 mg capsule, Take 1 pills in the morning, 1 pill at lunch and 2 pills before bed,  Disp: 120 capsule, Rfl: 0    hydrocortisone 1 % ointment, , Disp: , Rfl:     ibuprofen (MOTRIN) 100 mg/5 mL suspension, Take 20 mL (400 mg total) by mouth every 6 (six) hours as needed for moderate pain (Patient not taking: Reported on 1/31/2025), Disp: 473 mL, Rfl: 0    lidocaine-prilocaine (EMLA) cream, Apply topically as needed for mild pain (Patient not taking: Reported on 12/6/2024), Disp: 30 g, Rfl: 3    losartan (COZAAR) 50 mg tablet, Take 1 tablet (50 mg total) by mouth daily, Disp: 90 tablet, Rfl: 2    magnesium Oxide (MAG-OX) 400 mg TABS, 1 tablet (400 mg total) by Per G Tube route 2 (two) times a day (Patient not taking: Reported on 1/31/2025), Disp: 60 tablet, Rfl: 0    mirtazapine (REMERON) 15 mg tablet, Take 1 tablet (15 mg total) by mouth daily at bedtime Patient is also on a 30 mg tablet, for a total dose of 45 mg, Disp: 30 tablet, Rfl: 0    mirtazapine (REMERON) 30 mg tablet, Take 1 tablet (30 mg total) by mouth daily at bedtime, Disp: 30 tablet, Rfl: 0    nystatin (MYCOSTATIN) 500,000 units/5 mL suspension, Apply 5 mL (500,000 Units total) to the mouth or throat 4 (four) times a day, Disp: 600 mL, Rfl: 3    omeprazole (PriLOSEC) 20 mg delayed release capsule, Take 1 capsule (20 mg total) by mouth daily, Disp: 30 capsule, Rfl: 3    ondansetron (ZOFRAN) 8 mg tablet, Take 1 tablet (8 mg total) by mouth every 8 (eight) hours as needed for nausea or vomiting, Disp: 90 tablet, Rfl: 2    oxyCODONE (ROXICODONE) 5 mg/5 mL solution, Take 5 mL (5 mg total) by mouth every 6 (six) hours as needed for severe pain ((breakthrough pain)) Max Daily Amount: 20 mg, Disp: 473 mL, Rfl: 0    potassium chloride (Klor-Con M20) 20 mEq tablet, Take 1 tablet (20 mEq total) by mouth 2 (two) times a day (Patient not taking: Reported on 1/31/2025), Disp: 90 tablet, Rfl: 1    potassium chloride 10% oral solution, 15 mL (20 mEq total) by Per G Tube route 2 (two) times a day, Disp: 473 mL, Rfl: 0    prochlorperazine (COMPAZINE) 10  mg tablet, Take 1 tablet (10 mg total) by mouth every 6 (six) hours as needed for nausea or vomiting, Disp: 90 tablet, Rfl: 2    pyridoxine (VITAMIN B6) 50 mg tablet, Take 1 tablet (50 mg total) by mouth daily, Disp: 90 tablet, Rfl: 3    Trifluridine-Tipiracil 20-8.19 MG TABS, Take 3 tablets by mouth 2 (two) times a day Days 1-5 and 8-12 every 28 days, Disp: 60 tablet, Rfl: 5    vitamin B-12 (VITAMIN B-12) 1,000 mcg tablet, , Disp: , Rfl:   No current facility-administered medications for this visit.    Facility-Administered Medications Ordered in Other Visits:     alteplase (CATHFLO) injection 2 mg, 2 mg, Intracatheter, Q1MIN PRN, Harika Agostino, DO    alteplase (CATHFLO) injection 2 mg, 2 mg, Intracatheter, Q1MIN PRN, Harika Agostino, DO    alteplase (CATHFLO) injection 2 mg, 2 mg, Intracatheter, Q1MIN PRN, Harika Agostino, DO    bevacizumab (AVASTIN) 362.5 mg in sodium chloride 0.9 % 100 mL IVPB, 5 mg/kg (Treatment Plan Recorded), Intravenous, Once, Harika Agostino, DO    sodium chloride 0.9 % bolus 1,000 mL, 1,000 mL, Intravenous, Once, Harika Agostino, DO, Last Rate: 1,000 mL/hr at 06/11/25 1310, 1,000 mL at 06/11/25 1310    sodium chloride 0.9 % infusion, 20 mL/hr, Intravenous, Once, Harika Agostino, DO    Most Recent Lab Results:   Lab Results   Component Value Date    WBC 6.67 06/09/2025    NEUTROABS 4.98 06/09/2025    IRON 21 (L) 02/28/2025    TIBC 170.8 (L) 02/28/2025    FERRITIN 712 (H) 02/28/2025    CHOLESTEROL 167 04/24/2024    TRIG 137 04/24/2024    HDL 43 (L) 04/24/2024    LDLCALC 97 04/24/2024    ALT 12 06/09/2025    AST 37 06/09/2025    ALB 3.7 06/09/2025    SODIUM 136 06/09/2025    SODIUM 136 06/04/2025    K 4.3 06/09/2025    K 4.0 06/04/2025     06/09/2025    BUN 24 06/09/2025    BUN 28 (H) 06/04/2025    CREATININE 1.20 06/09/2025    CREATININE 1.02 06/04/2025    EGFR 46 06/09/2025    PHOS 2.8 11/01/2024    PHOS 3.3 10/30/2024    TSH 1.58 01/03/2022    GLUCOSE 209 (H) 03/12/2024    POCGLU  "89 12/27/2024    GLUF 102 (H) 10/30/2024    GLUF 95 09/13/2024    GLUC 86 06/09/2025    HGBA1C 5.5 02/21/2024    HGBA1C 5.9 (H) 06/13/2023    HGBA1C 6.1 (H) 07/09/2022    CALCIUM 9.6 06/09/2025    FOLATE 14.1 02/28/2025    MG 2.2 06/09/2025       Anthropometric Measurements:   Height: 66\"  Ht Readings from Last 1 Encounters:   06/11/25 5' 5\" (1.651 m)     Wt Readings from Last 20 Encounters:   06/11/25 70.6 kg (155 lb 10.3 oz)   05/27/25 72.5 kg (159 lb 12.8 oz)   05/14/25 72.6 kg (160 lb 0.9 oz)   05/07/25 73 kg (161 lb)   04/30/25 73.5 kg (162 lb)   04/30/25 73 kg (161 lb)   04/16/25 73.5 kg (162 lb)   04/02/25 72.6 kg (160 lb 0.9 oz)   04/01/25 72.6 kg (160 lb)   03/19/25 70.8 kg (156 lb 1.4 oz)   03/18/25 71.2 kg (157 lb)   03/05/25 73.1 kg (161 lb 2.5 oz)   02/28/25 72.6 kg (160 lb)   02/18/25 73 kg (161 lb)   02/04/25 73.4 kg (161 lb 12.8 oz)   01/31/25 75.1 kg (165 lb 9.6 oz)   01/30/25 75.6 kg (166 lb 10.7 oz)   01/22/25 75.6 kg (166 lb 10.7 oz)   01/10/25 77.1 kg (170 lb)   01/08/25 76.5 kg (168 lb 10.4 oz)       Weight History:   Usual Weight: 275#  [employer]: (2/22/19) 264.6#, (4/2/19) 263.4, (9/16/24) 197.4#, (9/23/24) 194#, (9/30/24) 192.8#, (10/7/24) 190.2#, (10/11/24) 194.6#, (10/14/24) 191#, (10/17/24) 192.8#, (10/21/24) 190#, (10/24/24) 190#, (10/28/24) 189#, (11/11/24) 185#  Home Scale: n/a    Oncology Nutrition-Anthropometrics      Flowsheet Row Nutrition from 6/11/2025 in [facility] Nutrition from 5/28/2025 in [facility]   Patient [age] (years): 70 years 70 years   Patient (female) height (in): 61 in 61 in   Current Weight to be used for anthropometric calculations (lbs) 155.7 lbs 159.8 lbs   Current Weight to be used for anthropometric calculations (kg) 70.8 kg 72.6 kg   BMI: 29.4 30.2   IBW female: 105 lbs 105 lbs   IBW (kg) female: 47.7 kg 47.7 kg   IBW % (female) 148.3 % 152.2 %   Adjusted BW (female): 117.7 lbs 118.7 " lbs   Adjusted BW kg (female): 53.5 kg 54 kg   % weight change after 1 month: -2.7 % -0.7 %   Weight change after 1 month (lbs) -4.4 lbs -1.2 lbs   % weight change after 3 months: -3.4 % -0.1 %   Weight change after 3 months (lbs) -5.5 lbs -0.2 lbs            Nutrition-Focused Physical Findings: n/a due to telephone call    Food/Nutrition-Related History & Client/Social History:    Current Nutrition Impact Symptoms:  [] Nausea  [x] Reduced Appetite  [] Acid Reflux    [] Vomiting  [x] Unintended Wt Loss -down slightly [] Malabsorption    [] Diarrhea -resolved [] Unintended Wt Gain  [] Dumping Syndrome    [] Constipation -BM every other day [] Thick Mucous/Secretions  [] Abdominal Pain    [x] Dysgeusia (Altered Taste) -starting to improve [x] Xerostomia (Dry Mouth)  [] Gas    [] Dysosmia (Altered Smell)  [] Thrush  [] Difficulty Chewing    [] Oral Mucositis (Sore Mouth)  [x] Fatigue  [] Hyperglycemia   Lab Results   Component Value Date    HGBA1C 5.5 02/21/2024      [x] Odynophagia -improving [] Esophagitis  [] Other:    [x] Dysphagia   Follows with SLP  Video swallow 5/29-recommending pureed diet with thin liquids [] Early Satiety  [] No Problems Eating      Food Allergies & Intolerances: no    Current Diet: Soft/Moist and Tube Feeding  Current Nutrition Intake: Unchanged from last visit  Appetite: Fair   Nutrition Route: PO and PEG  Oral Care: brushes daily  Activity level: ADLs.     24 Hr Diet Recall:   Breakfast: oatmeal  Lunch: mac and cheese (blended up)  Dinner: mashed potato    Beverages: water (sips throughout the day)  Supplements:   Ensure Complete (10 oz, 350 kcal, 30 g pro) -2 daily sometimes will drink them, sometimes puts them through PEG    EN Recall:  DME: [employer]  Access Type: PEG  Formula: [employer] Peptide 1.5  Method: Bolus  Regimen: 11oz (325 mL) 1-2  times per day of [employer] 1.5 via PEG (gravity syringe method to administer boluses; 1 container infusing over ~15-20 minutes).  Flush: 60  mL free water flush  pre/post each bolus (120 mL x 1-2 boluses = 120-240 mL)  EN providin mL volume (1 carton), 500kcal, 24g protein, 228 mL free water  650 mL volume (2 cartons/day), 1000 kcal (46% est needs), 48g protein (55% est needs), 456 mL free water  Pt also flushing 1-2 syringes every few hours for added hydration. Pt gets IVF 3x/week.       Oncology Nutrition-Estimated Needs      Flowsheet Row Nutrition from 2025 in [facility] Nutrition from 2025 in [facility]   Weight type used Actual weight Actual weight   Weight in kilograms (kg) used for estimated needs 73.2 kg 77.3 kg   Energy needs formula:  30-35 kcal/kg 30-35 kcal/kg   Energy needs based on 30 kcal/k kcal 2318 kcal   Energy needs based on 35 kcal/k kcal 2705 kcal   Protein needs formula: 1.2-1.5 g/kg 1.2-1.5 g/kg   Protein needs based on 1.2 g/k g 93 g   Protein needs based on 1.5 g/kg 110 g 116 g   Fluid needs formula: 30-35 mL/kg 30-35 mL/kg   Fluid needs based on 30 mL/kg 2196 mL 2319 mL   Fluid needs in ounces 74 oz 78 oz   Fluid needs based on 35 mL/kg 2562 mL 2706 mL   Fluid needs in ounces 87 oz 91 oz             Discussion & Intervention:   Maira was evaluated today for an RD follow up regarding HNC and enteral nutrition.  Maira has completed tx for HNC and is currently undergoing tx for metastatic colon cancer.  met with Maira today in the infusion center. She recently had a video swallow study and is recommended to be on a pureed diet with thin liquids. She states she's eating OK but tired of the pureed foods. Discussed some ideas with her. She continues to use her PEG, 1-2 cartons of [place] daily. Since she was eating a mechanical soft diet prior to VFSS and is now on purees, I recommended she increase her TF back to 2 cartons/day, especially since her weight is down slightly. She is still drinking Ensure, or  putting it through her feeding tube. She states she needs more TF formula and was told she needs a new script. Will reach out to ordering provider.    Reviewed 24 hour recall, which revealed an inadequate enteral intake and PO intake, and discussed ways to increase kcal, protein, and fluid intakes and optimize nutrient intake.  Also reviewed the importance of wt management throughout the tx process and the role of enteral nutrition in managing wt and overall health.  Based on today's assessment, discussion included: MNT for:   fluid, pureed diet,  enteral nutrition, practicing proper oral care, adequate hydration & fluid choices, utilizing oral nutrition supplements, practicing proper feeding tube use & care, adherence to and compliance with enteral nutrition plan of care, and individualized enteral nutrition recommendations & plan:  .   Moving forward, Maira was encouraged to increase kcal, protein, and fluid intakes via PEG and oral intake as able  Materials Provided: Ensure samples, Ensure Coupons, and Shayy Giveit100 samples  All questions and concerns addressed during today’s visit.  Maira has RD contact information.    Nutrition Diagnosis:   Inadequate Energy Intake related to physiological causes, disease state and treatment related issues as evidenced by food recall, wt loss and discussion with pt and/or family.  Increased Nutrient Needs (kcal & pro) related to increased demand for nutrients and disease state as evidenced by cancer dx and pt undergoing tx for cancer.  Swallowing Difficulty related to diagnosis/treatment related causes as evidenced by  need for feeding tube, diet restriction per SLP.  Monitoring & Evaluation:   Goals:  weight maintenance/stabilization  adequate nutrition impact symptom management  pt to meet >/=75% estimated nutrition needs daily  Utilize PEG feeds    Progress Towards Goals: Progressing    Nutrition Rx & Recommendations:  Diet: enteral nutrition as 1' source of nutrition,  pureed diet-high calorie high protein  Stay hydrated by sipping fluids of choice/tolerance throughout the day.    Follow proper oral care; Try baking soda/salt water rinse recipe (mix 3/4 tsp salt + 1 tsp baking soda + 1 qt water; rinse with plain water after using) in Eating Hints book (pg 18).  Brush your teeth before/after meals & before bed.  Weigh yourself regularly. If you notice weight loss, make an effort to increase your daily food/calorie intake. If you continue to notice loss after these efforts, reach out to your dietitian to establish a plan to stabilize weight.   Continue with Ensure Plus/Complete 2 times daily. try drinking orally, but OK to put through PEG if tolerated better  Choose liquids with calories: whole milk, Fairlife milk (higher protein/lactose-free milk), chocolate milk, drinkable yogurt, kefir, 100% fruit juice, diluted juice, bone broth (higher protein broth), creamy soups, sports drinks (Gatorade, Poweraide, Pedialyte, etc.), Italian ice, popsicles, milkshakes, smoothies, oral nutrition supplements (Ensure, Boost, etc.), gelatin/Jello, etc.  Pureed Foods:  To puree foods: placed chopped food into  or . Add enough liquid to cover the blade. Blend until food is smooth and free of chunks.  You may add non-fat milk, clear broths or non-fat gravies when blending foods.  You can use baby food as long as you have a pureed protein source as the main part of your meal.  Avoid items with added sugars (read food labels).  Food ideas:  Applesauce or canned fruit blended  Scrambled whole egg, egg white or egg substitutes  Greek yogurt, yogurt (plain or flavored)   pudding  Light cottage cheese  Ricotta cheese, part-skim  Vegetables (cook until soft & tender, without peels or skins) blended, or can use baby food (vegetables)  Mashed potatoes (white or sweet potato)  Oatmeal, cream of wheat cereal (made with non-fat milk)  Soups- can be blended in  or , or  with immersion       TF plan -   Continue at least 2 cartons of Sh[employer] 1.5 daily  If PO decreases and weight drops more, consider increasing to goal TF which is:  Sh[employer] 1.5 4 cartons daily (1300 mL total volume, 2000 calories, 96g protein, and 910 mL free water)  Contact RD for substitutions  Call [facility] when you have 10 days left of TF supplies: [phone], option 3 (customer support).      Follow Up Plan: during infusion on 6/25/25   Recommend Referral to Other Providers: none at this time

## 2025-06-11 NOTE — PROGRESS NOTES
Maira Moura  tolerated treatment well with no complications. UA obtained before start of treatment.    Maira Moura is aware of future appt on Monday Jun 16, 2025 11:00 AM.     AVS declined by Maira Moura.

## 2025-06-11 NOTE — PATIENT INSTRUCTIONS
Nutrition Rx & Recommendations:  Diet: enteral nutrition as 1' source of nutrition, pureed diet-high calorie high protein  Stay hydrated by sipping fluids of choice/tolerance throughout the day.   Follow proper oral care; Try baking soda/salt water rinse recipe (mix 3/4 tsp salt + 1 tsp baking soda + 1 qt water; rinse with plain water after using) in Eating Hints book (pg 18).  Brush your teeth before/after meals & before bed.  Weigh yourself regularly. If you notice weight loss, make an effort to increase your daily food/calorie intake. If you continue to notice loss after these efforts, reach out to your dietitian to establish a plan to stabilize weight.   Continue with Ensure Plus/Complete 2 times daily. try drinking orally, but OK to put through PEG if tolerated better  Choose liquids with calories: whole milk, Fairlife milk (higher protein/lactose-free milk), chocolate milk, drinkable yogurt, kefir, 100% fruit juice, diluted juice, bone broth (higher protein broth), creamy soups, sports drinks (Gatorade, Poweraide, Pedialyte, etc.), Italian ice, popsicles, milkshakes, smoothies, oral nutrition supplements (Ensure, Boost, etc.), gelatin/Jello, etc.  Pureed Foods:  To puree foods: placed chopped food into  or . Add enough liquid to cover the blade. Blend until food is smooth and free of chunks.  You may add non-fat milk, clear broths or non-fat gravies when blending foods.  You can use baby food as long as you have a pureed protein source as the main part of your meal.  Avoid items with added sugars (read food labels).  Food ideas:  Applesauce or canned fruit blended  Scrambled whole egg, egg white or egg substitutes  Greek yogurt, yogurt (plain or flavored)   pudding  Light cottage cheese  Ricotta cheese, part-skim  Vegetables (cook until soft & tender, without peels or skins) blended, or can use baby food (vegetables)  Mashed potatoes (white or sweet potato)  Oatmeal, cream of wheat cereal  (made with non-fat milk)  Soups- can be blended in  or , or with immersion       TF plan -   Continue at least 2 cartons of Shayy [place] 1.5 daily  If PO decreases and weight drops more, consider increasing to goal TF which is:  Sh[employer] 1.5 4 cartons daily (1300 mL total volume, 2000 calories, 96g protein, and 910 mL free water)  Contact RD for substitutions  Call [facility] when you have 10 days left of TF supplies: [phone], option 3 (customer support).      Follow Up Plan: during infusion on 6/25/25   Recommend Referral to Other Providers: none at this time

## 2025-06-12 ENCOUNTER — TELEPHONE (OUTPATIENT)
Dept: NUTRITION | Facility: CLINIC | Age: 70
End: 2025-06-12

## 2025-06-12 ENCOUNTER — OFFICE VISIT (OUTPATIENT)
Dept: SPEECH THERAPY | Facility: CLINIC | Age: 70
End: 2025-06-12
Attending: INTERNAL MEDICINE
Payer: MEDICARE

## 2025-06-12 DIAGNOSIS — C09.9 RIGHT TONSILLAR SQUAMOUS CELL CARCINOMA (HCC): ICD-10-CM

## 2025-06-12 DIAGNOSIS — Z90.89 STATUS POST TONSILLECTOMY: ICD-10-CM

## 2025-06-12 DIAGNOSIS — R13.12 DYSPHAGIA, OROPHARYNGEAL PHASE: Primary | ICD-10-CM

## 2025-06-12 PROCEDURE — 92526 ORAL FUNCTION THERAPY: CPT | Performed by: SPEECH-LANGUAGE PATHOLOGIST

## 2025-06-12 NOTE — TELEPHONE ENCOUNTER
Spoke with GI RN, Keo, regarding new script for TF. Per Megan, Maira needs to see GI before an order can be put through but it might be a while before she can get an appt. RD reached out to Maira to see how much formula she has left. No answer. Left VM encouraging a call back to discuss TF supplies.

## 2025-06-13 ENCOUNTER — TELEPHONE (OUTPATIENT)
Age: 70
End: 2025-06-13

## 2025-06-13 NOTE — TELEPHONE ENCOUNTER
----- Message from Megan WEAVER sent at 6/13/2025 11:00 AM EDT -----  PT need to be seen for office visit so we can order her tube feed.Can you try to place her with negra melton the next monthThank Megan aquino  ----- Message -----  From: Elvie Huynh RD  Sent: 6/11/2025   2:10 PM EDT  To: Megan Elizabeth RN

## 2025-06-15 NOTE — PROGRESS NOTES
Speech-Language Pathology Re-Evaluation    Today's date: 2025 ***  Patient’s name: Maira Moura  : 1955  MRN: [id number]  Safety measures: HTN, CVA, GERD, active CA, s/p PEG tube placement, aspiration precautions  Current recommended diet: PEG tube for primary nutrition/hydration; puree with thin liquids P.O. as tolerated ***  Referring provider: Harika Medina DO Encounter Diagnosis     ICD-10-CM    1. Dysphagia, oropharyngeal phase  R13.12       2. Status post tonsillectomy  Z90.89       3. 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 Patient had a repeat VFSS completed on 2025 (results included below). Patient reported that she is consuming *** meals/day along with her tube feedings (***). Patient is tolerating more P.O. textures and continues to be motivated to no longer require a PEG tube. Patient continues to follow with Nutrition services. Patient continues with c/o globus sensation with more dense and dry food consistencies, most likely related to decreased tongue base retraction and reduced hyolaryngeal elevation/excursion. Patient is also limited by xerostomia. Patient noted that her dysgeusia is improving, as well as her appetite. Patient has better controlled pain, too. Patient would continue to benefit from outpatient skilled Speech Therapy services for further education/training on safe swallowing strategies & aspiration precautions, for continued assessment of patient's tolerance of the safest, least restrictive diet, for therapeutic exercises, to facilitate overall improved quality of life, and for instruction on HEP.     -Safety concerns: Risk for aspiration, Risk for choking, and Risk for inadequate nutrition/ hydration    -Risk factors: History of Head and Neck Cancer, Complex medical history, and GERD      SWALLOWING SAFETY PRECAUTIONS:  PEG  tube for primary nutrition/hydration    -Recommended solids: Mechanical Soft (*moisten if dry) -- avoid/limit mixed textures    -Recommended liquids: Thin    -Recommended medication form: via PEG or crushed with puree (as tolerated) -- *consult with physician to discuss if medication form can be altered    -Frequent/thorough oral care    -Aspiration precautions  *Monitor for signs/symptoms concerning for aspiration (e.g., low grade fever, increase in WBC, change in chest x-ray, increased congestion, increased coughing with P.O. intake)    -Reflux precautions    -Strategies: Small sips and bites when eating, Slow rate, swallow between bites, Alternate liquids and solids, and Other (oral prep set)    -Positioning: Upright position during meals and Upright position at least 30 minutes after P.O. intake    -Compensatory strategies: Effortful swallow, Multiple swallows, and Other:(periodic volitional cough)    -Supervision: Independent     -Referrals: none at this time      Short-term goals: (: 2025)  Patient will receive a videofluoroscopic swallow study (VFSS) to assess her current swallowing function to r/o penetration or aspiration, to determine the safest, least restrictive diet, and to recommended the appropriate rehabilitation exercises. -- MET    Patient will tolerate P.O. trials of her recommended diet with the implementation of safe swallowing strategies without overt s/sx of penetration and/or aspiration during skilled therapy sessions in this certification period. -- PARTIALLY MET/ONGOING    Patient will perform oral motor exercises using IOPI device on lingual muscles to facilitate increased function and strength by 25% for improved swallowing function. -- MET    Patient will independently complete pharyngeal strengthening exercises (e.g., Effortful swallow, Mendelsohn, Evelia) daily to facilitate increased pharyngeal strength and coordination. -- PARTIALLY MET/ONGOING     Long-term  "goals:  Patient will maintain adequate nutrition and hydration with optimum safety and efficacy of swallowing function on P.O. intake without overt s/sx of penetration or aspiration (to be achieved by discharge). -- CONTINUE    Patient will independently utilize compensatory strategies with optimum safety and efficacy of swallowing function on P.O. intake without overt s/sx of penetration or aspiration (to be achieved by discharge). -- CONTINUE      Plan:   Patient would benefit from outpatient skilled Speech Therapy services: Dysphagia therapy (Due to patient's history of dysphagia, she may benefit from neuromuscular electrical stimulation (NMES) (i.e., VitalStim) treatment in conjunction with pharyngeal/laryngeal strengthening exercises and P.O. intake, unless otherwise contraindicated.)    Frequency: 2x weekly  Duration: 2 months    Intervention certification from: 6/19/2025  Intervention certification to: 08/19/2025      Subjective:   ***    Patient's goal(s): \"to learn how to swallow again\" ***    Pain: ***      Objective: ***    Dysphagia UPDATE    Functional Outcome Measurements    Functional Oral Intake Scale (FOIS): 3 - tube supplements with consistent oral intake (improved from 2)    -Eating Assessment Tool (EAT-10) is a self-administered, symptom-specific outcome instrument for dysphagia. It consists of ten statements that a patient rates on a scale of 0-4, with 0=no problem to 4=severe problem. A score of 3 or more is abnormal.     Patient obtained a score of *** today.    04/29/25: 23/40 03/11/25: 32/40 01/06/25: 39/40      -Difficulty swallowing: Solids, Liquids, and Pills     -Current diet (solids): mechanical soft/puree textures 3x/day ***  -Current diet (liquids): Thin   -Current pill intake method: Takes small pills whole with water, takes larger pills crushed in puree ***  -Alternative Feeding Method?: PEG tube (taking 3 cans of TF formula/day and 2 ensure via PEG) ***      Repeat " "videofluoroscopic swallow study (VFSS) completed on 05/29/2025 revealed:  \"...Summary:  Images are on PACS for review.      Oral stage: Mild  WNL for lip closure, slow/careful/frontal/adequate mastication w/ min trial solid. Reduced bolus formation and control w/ liquids. Improved w/ cue for smaller sips and oral prep set. Transfer was adequate. Trace lingual residue. (+ Posterior lingual residue)      Pharyngeal stage:Mod  WNL for prompt swallow. Trace column of contrast between soft palate and PPW (1). Material retropulsed from the tongue base/valleculae inconsistently. ? TB intermittently moving more superior instead of posterior. Partial laryngeal elevation (1). Partial anterior hyoid excursion (1). Partial or absent epiglottic inversion (1,2), Incomplete laryngeal vestibular closure (1). Diminished pharyngeal constriction (1). Complete passage through UES/UES opening (0).  Reduced tongue base retraction/wide column of contrast (3). 1/4th cookie bolus remained at the BOT and multiple swallows were needed to clear. Min chin down and head rotation b/l did not appear effective in clearing. + Transient penetration, trace penetration, and/or aspiration of thin. Aspiration was often 2* spill of retention prior to the secondary swallow. Reduced or eliminated w/ smaller sip and oral prep set.  Cough response was inconsistent. Pt was cued to cough. Appeared to be suppressing her cough x 2. Educated that if she felt the need to cough she should cough. Retention was primarily BOT/Vallecular. Intermittent mild PPW and pyriform retention that cleared w/ secondary swallow. No penetration or aspiration w/ nectar thick, puree, or min solid trial (1/4 cookie).     Per gross esophageal screen: distal stasis that cleared w/ additional cued swallow.         Strategy Trialed y/n  Result +/-   Oral prep set                                      Y                       +  Secondary/multiple swallows Y +   Effortful/hard swallow Y ? " "  Alternation w/ liquids N     Chin down/neck flexion Y -   Volitional cough/throat clear Y +/-   Head turn/rotation Y Possibly to R w/ liquds though sip size was also smaller   Breath hold/cough N     Head tilt N     Other: Smaller sip size                       Y                      +     Recommendations:  Diet:PEG for primary. Purees, (trial small bites soft/moist foods), thin water, other liquids nectar (no aspiration of nectar today). May want to avoid or limit mixed textures for now.   Meds:PEG  Strategies:Small single sips, oral prep set, multiple swallows, do not suppress cough, periodic volitional cough  Frequent/thorough oral care  Upright position  F/u ST tx: Yes per treating slp  PRN Supervision  Aspiration Precautions  Reflux Precautions  Consider consult with: nutrition following  Results reviewed with: pt  Repeat MBS as necessary  If a dedicated assessment of the esophagus is desired:  Consult w/ SLP prior to doing any additional barium studies if the pt aspirated during this study. Consider esophagram/routine barium swallow w/ barium tablet administration, FL Upper GI/UGI, or EGD (Options may be limited if pt cannot stand/maneuver for barium studies)...\"      Treatment: ***    Traditional VitalStim     Channel(s) used: 1, 2   Placement: 2b   Duty Cycle: 57/1 s   Frequency: 80 pulses per second   Phase Duration: 300 microseconds   Treatment Duration: 39 minutes   Min mA level: 5.0 mA   Max mA level: 8.0 mA      Patient tolerated NMES in conjunction with pharyngeal/laryngeal strengthening exercises and P.O. intake. (The patient received instruction on the potential benefits from NMES treatment, including neuromuscular re-education and muscle strengthening.) Skin condition post-NMES: intact.      Patient consumed the following during today’s session: peach yogurt with small pieces of whole peaches and water (thin liquid) via side of cup -- NMES remained on during pharyngeal strengthening exercises (see " below).     Oral containment, mastication, bolus formation/control, AP transfer, and swallow initiation were judged to be WFL. Reduced hyolaryngeal elevation and excursion noted upon palpation. No oral cavity residue was observed. Patient without any c/o globus sensation. Patient demonstrated cough x3 with yogurt and x1 with TL. Voice returned to baseline. No other overt s/sx of penetration/aspiration noted during today's session.      -Patient completed the following pharyngeal strengthening exercises during today's session:  *Effortful swallow: 1 set of 10 reps  *Mendelsohn: 1 set of 10 reps  *Evelia: 1 set of 10 reps      Visit Tracking:  POC   Expires Auth Expiration Date ST Visit Limit   08/19/2025 or 10th visit 12/31/2025 BOMN              Visit/Unit Tracking:  Auth Status Date 05/01/25 05/06/25 05/08/25 05/13/25 05/15/25 05/20/25 06/03/25 06/05/25 06/12/25 ***   Not required Used 1 2 3 4 5 6 7  8 9 10     Remaining 9 8 7 6 5 4 3  2 1 0

## 2025-06-16 ENCOUNTER — HOSPITAL ENCOUNTER (OUTPATIENT)
Dept: INFUSION CENTER | Facility: CLINIC | Age: 70
Discharge: HOME | End: 2025-06-16
Payer: MEDICARE

## 2025-06-16 VITALS
HEART RATE: 112 BPM | RESPIRATION RATE: 16 BRPM | TEMPERATURE: 97.9 F | DIASTOLIC BLOOD PRESSURE: 70 MMHG | SYSTOLIC BLOOD PRESSURE: 110 MMHG

## 2025-06-16 DIAGNOSIS — E86.0 DEHYDRATION: Primary | ICD-10-CM

## 2025-06-16 LAB
ALBUMIN SERPL BCG-MCNC: 3.5 G/DL (ref 3.5–5)
ALP SERPL-CCNC: 109 U/L (ref 34–104)
ALT SERPL W P-5'-P-CCNC: 16 U/L (ref 7–52)
ANION GAP SERPL CALCULATED.3IONS-SCNC: 6 MMOL/L (ref 4–13)
AST SERPL W P-5'-P-CCNC: 55 U/L (ref 13–39)
BASOPHILS # BLD AUTO: 0.04 THOUSANDS/ÂΜL (ref 0–0.1)
BASOPHILS NFR BLD AUTO: 0 % (ref 0–1)
BILIRUB SERPL-MCNC: 0.63 MG/DL (ref 0.2–1)
BUN SERPL-MCNC: 24 MG/DL (ref 5–25)
CALCIUM SERPL-MCNC: 9.8 MG/DL (ref 8.4–10.2)
CHLORIDE SERPL-SCNC: 97 MMOL/L (ref 96–108)
CO2 SERPL-SCNC: 29 MMOL/L (ref 21–32)
CREAT SERPL-MCNC: 1.07 MG/DL (ref 0.6–1.3)
EOSINOPHIL # BLD AUTO: 0.01 THOUSAND/ÂΜL (ref 0–0.61)
EOSINOPHIL NFR BLD AUTO: 0 % (ref 0–6)
ERYTHROCYTE [DISTWIDTH] IN BLOOD BY AUTOMATED COUNT: 13.7 % (ref 11.6–15.1)
GFR SERPL CREATININE-BSD FRML MDRD: 53 ML/MIN/1.73SQ M
GLUCOSE SERPL-MCNC: 112 MG/DL (ref 65–140)
HCT VFR BLD AUTO: 28.6 % (ref 34.8–46.1)
HGB BLD-MCNC: 9.4 G/DL (ref 11.5–15.4)
IMM GRANULOCYTES # BLD AUTO: 0.04 THOUSAND/UL (ref 0–0.2)
IMM GRANULOCYTES NFR BLD AUTO: 0 % (ref 0–2)
LYMPHOCYTES # BLD AUTO: 0.9 THOUSANDS/ÂΜL (ref 0.6–4.47)
LYMPHOCYTES NFR BLD AUTO: 7 % (ref 14–44)
MAGNESIUM SERPL-MCNC: 2.2 MG/DL (ref 1.9–2.7)
MCH RBC QN AUTO: 31.4 PG (ref 26.8–34.3)
MCHC RBC AUTO-ENTMCNC: 32.9 G/DL (ref 31.4–37.4)
MCV RBC AUTO: 96 FL (ref 82–98)
MONOCYTES # BLD AUTO: 1.65 THOUSAND/ÂΜL (ref 0.17–1.22)
MONOCYTES NFR BLD AUTO: 13 % (ref 4–12)
NEUTROPHILS # BLD AUTO: 9.75 THOUSANDS/ÂΜL (ref 1.85–7.62)
NEUTS SEG NFR BLD AUTO: 80 % (ref 43–75)
NRBC BLD AUTO-RTO: 0 /100 WBCS
PLATELET # BLD AUTO: 328 THOUSANDS/UL (ref 149–390)
PMV BLD AUTO: 11.5 FL (ref 8.9–12.7)
POTASSIUM SERPL-SCNC: 4.2 MMOL/L (ref 3.5–5.3)
PROT SERPL-MCNC: 8.2 G/DL (ref 6.4–8.4)
RBC # BLD AUTO: 2.99 MILLION/UL (ref 3.81–5.12)
SODIUM SERPL-SCNC: 132 MMOL/L (ref 135–147)
WBC # BLD AUTO: 12.39 THOUSAND/UL (ref 4.31–10.16)

## 2025-06-16 PROCEDURE — 80053 COMPREHEN METABOLIC PANEL: CPT | Performed by: INTERNAL MEDICINE

## 2025-06-16 PROCEDURE — 85025 COMPLETE CBC W/AUTO DIFF WBC: CPT | Performed by: INTERNAL MEDICINE

## 2025-06-16 PROCEDURE — 83735 ASSAY OF MAGNESIUM: CPT | Performed by: INTERNAL MEDICINE

## 2025-06-16 PROCEDURE — 96360 HYDRATION IV INFUSION INIT: CPT

## 2025-06-16 RX ADMIN — SODIUM CHLORIDE 1000 ML: 0.9 INJECTION, SOLUTION INTRAVENOUS at 11:17

## 2025-06-16 NOTE — PLAN OF CARE
Problem: Potential for Falls  Goal: Patient will remain free of falls  Description: INTERVENTIONS:  - Educate patient/family on patient safety including physical limitations  - Instruct patient to call for assistance with activity   - Consider consulting OT/PT to assist with strengthening/mobility based on AM PAC & JH-HLM score  - Consult OT/PT to assist with strengthening/mobility   - Keep Call bell within reach  - Keep bed low and locked with side rails adjusted as appropriate  - Keep care items and personal belongings within reach  - Initiate and maintain comfort rounds  - Make Fall Risk Sign visible to staff  - Offer Toileting every  Hours, in advance of need  - Initiate/Maintain alarm  - Obtain necessary fall risk management equipment:   - Apply yellow socks and bracelet for high fall risk patients  - Consider moving patient to room near nurses station  Outcome: Completed

## 2025-06-16 NOTE — PROGRESS NOTES
Cardio-Oncology Clinic Note    Maira Moura 70 y.o. 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  Right sided.  S/p XRT  Colon cancer  Dx 2023  Metastatic to liver  S/p surgery 3/2024  S/p cryoablation of right hepatic metastatic lesion 6-2024  Therapy:  Prior:  Opdivo (nivolumab)  Prior:   5-FU, irinotecan  Current:   anastrazole, avastin, Lonsurf     # Diastolic dysfunction on echo  Not unexpected echo finding given age, HTN, obesity  Reports mild MOSHER  Exam -  euvolemic  BNP - was normal  No medical therapy for this unless clinical CHF / volume overload (which currently is not the case)    # HTN  Losartan  50mg  daily  BP today 102/74.  Reports home measurements 104-130 systolic.  Just started Avastin which can raise blood pressure.  Therefore we will continue to monitor but if we continue to see low blood pressures may want to reduce the dose.    # HLD  Of note, does have coronary calcifications on CT  On Lipitor 40mg qhs  repeated lipids on statin  - LDL improved to 97 (from 150)     # Hx TIA  2017.    # High risk medication use  Avastin - can worsen BP.  Continue to monitor  Lonsurf -   rare reports of PE.  Discussed.       Today's Plan Summary:  See above assessment/plan for full details of today's plan.  Briefly,     No medication changes.               Reason For Visit / Chief Complaint:  F/u diastolic dysfunction, HTN, HLD    HPI:   Maira Moura is a 70 y.o.  female with history as noted in the problem list and further detailed in the above assessment and plan.    Initial:   Dec 2023  Referred by Dr. Medina for positional lightheadedness and diastolic dysfunction on echo.  As above, the patient has a history of breast cancer and more recently colon cancer and squamous cell carcinoma of the tonsil.  The patient has been  started on immunotherapy and FOLFOX.  At the recent oncology visit she reported some positional lightheadedness.  An echo was obtained demonstrating diastolic dysfunction.   Today, the patient reports - fatigue is main complaint.   Also has lightheadedness.   Episodes last a few minutes.  Can be when just laying there.  Can also be positional.   Describes sx's as a dizziness.  Sx's started before chemotherapy but worse after starting.   No syncope.   No CP.   Some SOB.  Some MOSHER.  No palpitations.    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   Single.  5 kids.    Interval:  Last visit -->  Chemotherapy regimen was switched to 5-FU and irinotecan.    Plan last visit -->    no medication changes.  We did discuss the potential cardiac complications of her current chemotherapy regimen.    CT scan - Interval progression of metastatic disease, including new and enlarging hepatic metastases.     Chemotherapy switched to Avastin, Lonsurf     Reviewed CMP from June 16.  Sodium slightly low at 132.  Creatinine stable.    Today - feeling ok.  No CP or SOB.  No palpitations, presyncope, or syncope.  No edema.  Home  - 130 systolic.             Cardiac Imaging personally reviewed:  EKG 6/2023  NSR    12-12-23  Sinus tachycardia at 104 bpm.  Otherwise, normal EKG.       Holter or event monitor    Echo 10/2023  Mild LVH.  EF 65% (on my reivew).  Grade 1 diastolic dysfunction.  Normal RV size and function.  No significant valve disease         JAZMYN    Cardiac MRI    Stress testing    Coronary CTA or [facility]    RHC    CPET              Patient Active Problem List    Diagnosis Date Noted   • Hypertensive heart and renal disease with congestive heart failure (HCC) 02/04/2025   • Antineoplastic chemotherapy induced anemia 01/22/2025   • Functional diarrhea 12/31/2024   • Hypomagnesemia 11/27/2024   • Cancer related pain 11/19/2024   • Pancytopenia due to chemotherapy (HCC) 10/30/2024   • Neoplastic malignant related fatigue  10/29/2024   • Sensation, choking 10/29/2024   • S/P percutaneous endoscopic gastrostomy (PEG) tube placement (HCC) 10/29/2024   • Vitamin B12 deficiency 05/08/2024   • Elevated LFTs 03/25/2024   • Chronic nasal congestion 03/18/2024   • Hypokalemia 03/15/2024   • Leukemoid reaction 03/15/2024   • GOGO (acute kidney injury) (HCC) 03/15/2024   • Acute post-operative pain 03/15/2024   • At risk for constipation 03/15/2024   • At risk for delirium 03/15/2024   • Palliative care encounter 03/15/2024   • Physical deconditioning 03/15/2024   • History of CVA (cerebrovascular accident) 03/15/2024   • Diastolic dysfunction 01/12/2024   • Neuropathy 01/11/2024   • Thrombocytopenia (HCC) 12/29/2023   • Stage 3a chronic kidney disease (HCC) 12/12/2023   • Chemotherapy induced neutropenia (HCC) 11/09/2023   • Dehydration 10/06/2023   • Drug-induced constipation 10/06/2023   • Nutritional anemia 08/24/2023   • Poor appetite 08/03/2023   • Right tonsillar squamous cell carcinoma (HCC) 07/27/2023   • Metastatic colon cancer to liver (HCC) 07/11/2023   • GERD (gastroesophageal reflux disease)    • Obesity    • Right thyroid nodule 04/04/2023   • Prediabetes 07/07/2022   • Vitamin D deficiency 01/25/2019   • Malignant neoplasm of right female breast (HCC) 12/20/2018   • Mixed hyperlipidemia 08/09/2018   • Primary hypertension 07/27/2017       Past Medical History:   Diagnosis Date   • Anemia    • Arthritis    • Body mass index (BMI) 40.0-44.9, adult (HCC) 9/22/2023   • Breast cancer (HCC) 12/17/2018   • Cancer (HCC)     right breast, colon, liver, right tonsil   • Cervical lymphadenopathy 3/21/2023    CT neck on 3/30/2023- Large right level 2A lymphadenopathy as described above and suspicious for neoplasm.  Correlation with histopathology is recommended.  Mild asymmetric prominence of the right palatine tonsil with otherwise no definitive nodular enhancing lesions identified along the course of the aerodigestive tract.     5/26/23-  FNA of this node was nonrevealing for tissue etiology, but it d   • Encounter for screening for HIV 07/07/2022   • Follow-up examination 04/04/2023   • GERD (gastroesophageal reflux disease)    • Hyperlipidemia    • Hypertension    • Obesity    • Stroke (HCC)     TIA    • Stroke (HCC)     TIA    • Transaminitis 09/22/2021   • Vasomotor rhinitis 8/9/2018    Refilled flonase today. Stopped taking since January 2022       No Known Allergies    Current Outpatient Medications   Medication Instructions   • acetaminophen (TYLENOL) 160 mg/5 mL liquid Take 20 mL (640 mg total) by mouth every 6 (six) hours as needed for mild pain for up to 10 days   • anastrozole (ARIMIDEX) 1 mg, Oral, Daily   • atorvastatin (LIPITOR) 40 mg, Oral, Daily at bedtime   • cholestyramine (QUESTRAN) 4 g, Oral, 3 times daily with meals   • diphenhydramine, lidocaine, Al/Mg hydroxide, simethicone (Magic Mouthwash) SUSP 10 mL, Swish & Swallow, Every 4 hours PRN   • docusate sodium (COLACE) 50 mg, Oral, 2 times daily   • dronabinol (MARINOL) 2.5 mg, Oral, Daily   • ergocalciferol (VITAMIN D2) 50,000 Units, Oral, Weekly   • folic acid (FOLVITE) 1 mg, Oral, Daily   • gabapentin (NEURONTIN) 300 mg capsule Take 1 pills in the morning, 1 pill at lunch and 2 pills before bed   • hydrocortisone 1 % ointment    • ibuprofen (MOTRIN) 400 mg, Oral, Every 6 hours PRN   • lidocaine-prilocaine (EMLA) cream Topical, As needed   • losartan (COZAAR) 50 mg, Oral, Daily   • magnesium Oxide (MAG-OX) 400 mg, Per G Tube, 2 times daily   • mirtazapine (REMERON) 30 mg, Oral, Daily at bedtime   • mirtazapine (REMERON) 15 mg, Oral, Daily at bedtime, Patient is also on a 30 mg tablet, for a total dose of 45 mg   • nystatin (MYCOSTATIN) 500,000 Units, Mouth/Throat, 4 times daily   • omeprazole (PRILOSEC) 20 mg, Oral, Daily   • ondansetron (ZOFRAN) 8 mg, Oral, Every 8 hours PRN   • oxyCODONE (ROXICODONE) 5 mg, Oral, Every 6 hours PRN   • potassium chloride (Klor-Con M20) 20 mEq  tablet 20 mEq, Oral, 2 times daily   • potassium chloride 10% oral solution 20 mEq, Per G Tube, 2 times daily   • prochlorperazine (COMPAZINE) 10 mg, Oral, Every 6 hours PRN   • pyridoxine (VITAMIN B6) 50 mg, Oral, Daily   • Trifluridine-Tipiracil 20-8.19 MG TABS 3 tablets, Oral, 2 times daily, Days 1-5 and 8-12 every 28 days   • vitamin B-12 (VITAMIN B-12) 1,000 mcg tablet        Social History     Socioeconomic History   • Marital status: Single     Spouse name: Not on file   • Number of children: Not on file   • Years of education: Not on file   • Highest education level: Not on file   Occupational History   • Not on file   Tobacco Use   • Smoking status: Never     Passive exposure: Never   • Smokeless tobacco: Never   • Tobacco comments:     NO TOBACCO USE   Vaping Use   • Vaping status: Never Used   Substance and Sexual Activity   • Alcohol use: Never   • Drug use: Never   • Sexual activity: Not on file   Other Topics Concern   • Not on file   Social History Narrative   • Not on file     Social Drivers of Health     Financial Resource Strain: Low Risk  (10/9/2023)    Overall Financial Resource Strain (CARDIA)    • Difficulty of Paying Living Expenses: Not hard at all   Food Insecurity: No Food Insecurity (10/31/2024)    Nursing - Inadequate Food Risk Classification    • Worried About Running Out of Food in the Last Year: Never true    • Ran Out of Food in the Last Year: Never true    • Ran Out of Food in the Last Year: Never true   Transportation Needs: No Transportation Needs (10/31/2024)    PRAPARE - Transportation    • Lack of Transportation (Medical): No    • Lack of Transportation (Non-Medical): No   Recent Concern: Transportation Needs - Unmet Transportation Needs (10/29/2024)    Nursing - Transportation Risk Classification    • Lack of Transportation: Not on file    • Lack of Transportation: Yes   Physical Activity: Not on file   Stress: Not on file   Social Connections: Not on file   Intimate Partner  "Violence: Unknown (10/29/2024)    Nursing IPS    • Feels Physically and Emotionally Safe: Not on file    • Physically Hurt by Someone: Not on file    • Humiliated or Emotionally Abused by Someone: Not on file    • Physically Hurt by Someone: No    • Hurt or Threatened by Someone: No   Housing Stability: Low Risk  (10/31/2024)    Housing Stability Vital Sign    • Unable to Pay for Housing in the Last Year: No    • Number of Times Moved in the Last Year: 0    • Homeless in the Last Year: No       Family History   Problem Relation Name Age of Onset   • Colon cancer Mother  58   • Hypertension Mother     • Pancreatic cancer Father  60   • Hypertension Daughter     • Hypertension Son         Physical Exam:  Blood pressure 102/74, pulse (!) 108, height 5' 5\" (1.651 m), weight 70.7 kg (155 lb 12.8 oz), SpO2 98%, not currently breastfeeding.  Body mass index is 25.93 kg/m².  Wt Readings from Last 3 Encounters:   06/18/25 70.7 kg (155 lb 12.8 oz)   06/11/25 70.6 kg (155 lb 10.3 oz)   05/27/25 72.5 kg (159 lb 12.8 oz)     Physical Exam  Vitals reviewed.   Constitutional:       General: She is not in acute distress.     Appearance: She is not toxic-appearing.   Neck:      Comments: JVP is not elevated  Cardiovascular:      Rate and Rhythm: Regular rhythm. Tachycardia present.      Heart sounds: No murmur heard.     No friction rub. No gallop.   Pulmonary:      Breath sounds: Normal breath sounds. No wheezing, rhonchi or rales.     Musculoskeletal:      Comments: No LE edema     Neurological:      Mental Status: She is alert.         Labs & Results:  Lab Results   Component Value Date    SODIUM 132 (L) 06/16/2025    K 4.2 06/16/2025    CL 97 06/16/2025    CO2 29 06/16/2025    BUN 24 06/16/2025    CREATININE 1.07 06/16/2025    GLUC 112 06/16/2025    CALCIUM 9.8 06/16/2025     No results found for: \"NTBNP\"       Thank you for the opportunity to participate in the care of this patient.    Matthew Whitfield MD, [facility]  Staff " Cardiologist  Director of Cardio-Oncology  [facility]

## 2025-06-16 NOTE — PROGRESS NOTES
Pt offers no new complaints today, tolerated hydration/central labs per orders without complications, confirmed appt back on Wed 6-18-25 12:00, AVS declined.

## 2025-06-17 ENCOUNTER — HOSPITAL ENCOUNTER (OUTPATIENT)
Dept: RADIOLOGY | Facility: HOSPITAL | Age: 70
Discharge: HOME | End: 2025-06-17
Attending: INTERNAL MEDICINE
Payer: MEDICARE

## 2025-06-17 ENCOUNTER — APPOINTMENT (OUTPATIENT)
Dept: SPEECH THERAPY | Facility: CLINIC | Age: 70
End: 2025-06-17
Attending: INTERNAL MEDICINE
Payer: MEDICARE

## 2025-06-17 DIAGNOSIS — C09.9 RIGHT TONSILLAR SQUAMOUS CELL CARCINOMA (HCC): ICD-10-CM

## 2025-06-17 PROCEDURE — 70491 CT SOFT TISSUE NECK W/DYE: CPT

## 2025-06-17 RX ADMIN — IOHEXOL 85 ML: 350 INJECTION, SOLUTION INTRAVENOUS at 12:34

## 2025-06-17 NOTE — TELEPHONE ENCOUNTER
I spoke with adapt a 10day supply was sent to pt in the 10th of June.  Office called to set up appointment.  We need to see pt in office so that we can send tube feed orders.  Message sent to COLLEEN Bermeo she is aware.

## 2025-06-18 ENCOUNTER — HOSPITAL ENCOUNTER (OUTPATIENT)
Dept: INFUSION CENTER | Facility: CLINIC | Age: 70
Discharge: HOME | End: 2025-06-18
Payer: MEDICARE

## 2025-06-18 ENCOUNTER — OFFICE VISIT (OUTPATIENT)
Age: 70
End: 2025-06-18
Payer: MEDICARE

## 2025-06-18 VITALS
OXYGEN SATURATION: 98 % | WEIGHT: 155.8 LBS | HEART RATE: 108 BPM | SYSTOLIC BLOOD PRESSURE: 102 MMHG | DIASTOLIC BLOOD PRESSURE: 74 MMHG | HEIGHT: 65 IN | BODY MASS INDEX: 25.96 KG/M2

## 2025-06-18 VITALS
TEMPERATURE: 98.2 F | SYSTOLIC BLOOD PRESSURE: 111 MMHG | HEART RATE: 88 BPM | DIASTOLIC BLOOD PRESSURE: 76 MMHG | RESPIRATION RATE: 18 BRPM

## 2025-06-18 DIAGNOSIS — E78.2 MIXED HYPERLIPIDEMIA: ICD-10-CM

## 2025-06-18 DIAGNOSIS — E86.0 DEHYDRATION: Primary | ICD-10-CM

## 2025-06-18 DIAGNOSIS — Z79.899 HIGH RISK MEDICATION USE: ICD-10-CM

## 2025-06-18 DIAGNOSIS — I10 PRIMARY HYPERTENSION: ICD-10-CM

## 2025-06-18 DIAGNOSIS — I51.89 DIASTOLIC DYSFUNCTION: Primary | ICD-10-CM

## 2025-06-18 LAB
ALBUMIN SERPL BCG-MCNC: 3.1 G/DL (ref 3.5–5)
ALP SERPL-CCNC: 106 U/L (ref 34–104)
ALT SERPL W P-5'-P-CCNC: 17 U/L (ref 7–52)
ANION GAP SERPL CALCULATED.3IONS-SCNC: 6 MMOL/L (ref 4–13)
AST SERPL W P-5'-P-CCNC: 41 U/L (ref 13–39)
BASOPHILS # BLD AUTO: 0.04 THOUSANDS/ÂΜL (ref 0–0.1)
BASOPHILS NFR BLD AUTO: 1 % (ref 0–1)
BILIRUB SERPL-MCNC: 0.31 MG/DL (ref 0.2–1)
BUN SERPL-MCNC: 26 MG/DL (ref 5–25)
CALCIUM ALBUM COR SERPL-MCNC: 9.8 MG/DL (ref 8.3–10.1)
CALCIUM SERPL-MCNC: 9.1 MG/DL (ref 8.4–10.2)
CHLORIDE SERPL-SCNC: 100 MMOL/L (ref 96–108)
CO2 SERPL-SCNC: 27 MMOL/L (ref 21–32)
CREAT SERPL-MCNC: 1.02 MG/DL (ref 0.6–1.3)
EOSINOPHIL # BLD AUTO: 0.23 THOUSAND/ÂΜL (ref 0–0.61)
EOSINOPHIL NFR BLD AUTO: 3 % (ref 0–6)
ERYTHROCYTE [DISTWIDTH] IN BLOOD BY AUTOMATED COUNT: 13.9 % (ref 11.6–15.1)
GFR SERPL CREATININE-BSD FRML MDRD: 56 ML/MIN/1.73SQ M
GLUCOSE SERPL-MCNC: 99 MG/DL (ref 65–140)
HCT VFR BLD AUTO: 24.9 % (ref 34.8–46.1)
HGB BLD-MCNC: 8.3 G/DL (ref 11.5–15.4)
IMM GRANULOCYTES # BLD AUTO: 0.02 THOUSAND/UL (ref 0–0.2)
IMM GRANULOCYTES NFR BLD AUTO: 0 % (ref 0–2)
LYMPHOCYTES # BLD AUTO: 0.64 THOUSANDS/ÂΜL (ref 0.6–4.47)
LYMPHOCYTES NFR BLD AUTO: 8 % (ref 14–44)
MAGNESIUM SERPL-MCNC: 2.4 MG/DL (ref 1.9–2.7)
MCH RBC QN AUTO: 31.7 PG (ref 26.8–34.3)
MCHC RBC AUTO-ENTMCNC: 33.3 G/DL (ref 31.4–37.4)
MCV RBC AUTO: 95 FL (ref 82–98)
MONOCYTES # BLD AUTO: 1.19 THOUSAND/ÂΜL (ref 0.17–1.22)
MONOCYTES NFR BLD AUTO: 15 % (ref 4–12)
NEUTROPHILS # BLD AUTO: 6.05 THOUSANDS/ÂΜL (ref 1.85–7.62)
NEUTS SEG NFR BLD AUTO: 73 % (ref 43–75)
NRBC BLD AUTO-RTO: 0 /100 WBCS
PLATELET # BLD AUTO: 313 THOUSANDS/UL (ref 149–390)
PMV BLD AUTO: 10.9 FL (ref 8.9–12.7)
POTASSIUM SERPL-SCNC: 4.2 MMOL/L (ref 3.5–5.3)
PROT SERPL-MCNC: 7.3 G/DL (ref 6.4–8.4)
RBC # BLD AUTO: 2.62 MILLION/UL (ref 3.81–5.12)
SODIUM SERPL-SCNC: 133 MMOL/L (ref 135–147)
WBC # BLD AUTO: 8.17 THOUSAND/UL (ref 4.31–10.16)

## 2025-06-18 PROCEDURE — G2211 COMPLEX E/M VISIT ADD ON: HCPCS | Performed by: INTERNAL MEDICINE

## 2025-06-18 PROCEDURE — 99214 OFFICE O/P EST MOD 30 MIN: CPT | Performed by: INTERNAL MEDICINE

## 2025-06-18 PROCEDURE — 80053 COMPREHEN METABOLIC PANEL: CPT | Performed by: INTERNAL MEDICINE

## 2025-06-18 PROCEDURE — 96360 HYDRATION IV INFUSION INIT: CPT

## 2025-06-18 PROCEDURE — 83735 ASSAY OF MAGNESIUM: CPT | Performed by: INTERNAL MEDICINE

## 2025-06-18 PROCEDURE — 85025 COMPLETE CBC W/AUTO DIFF WBC: CPT | Performed by: INTERNAL MEDICINE

## 2025-06-18 RX ORDER — ATORVASTATIN CALCIUM 40 MG/1
40 TABLET, FILM COATED ORAL
Qty: 90 TABLET | Refills: 3 | Status: SHIPPED | OUTPATIENT
Start: 2025-06-18

## 2025-06-18 RX ADMIN — SODIUM CHLORIDE 1000 ML: 0.9 INJECTION, SOLUTION INTRAVENOUS at 12:27

## 2025-06-18 NOTE — PATIENT INSTRUCTIONS
No medication changes.  Continue to monitor blood pressure at home and let us know if it is too high or too low.

## 2025-06-19 ENCOUNTER — APPOINTMENT (OUTPATIENT)
Dept: SPEECH THERAPY | Facility: CLINIC | Age: 70
End: 2025-06-19
Attending: INTERNAL MEDICINE
Payer: MEDICARE

## 2025-06-19 DIAGNOSIS — R13.12 DYSPHAGIA, OROPHARYNGEAL PHASE: Primary | ICD-10-CM

## 2025-06-19 DIAGNOSIS — Z90.89 STATUS POST TONSILLECTOMY: ICD-10-CM

## 2025-06-19 DIAGNOSIS — C09.9 RIGHT TONSILLAR SQUAMOUS CELL CARCINOMA (HCC): ICD-10-CM

## 2025-06-22 NOTE — PROGRESS NOTES
Speech-Language Pathology Re-Evaluation    Today's date: 2025 ***  Patient’s name: Maira Moura  : 1955  MRN: [id number]  Safety measures: HTN, CVA, GERD, active CA, s/p PEG tube placement, aspiration precautions  Current recommended diet: PEG tube for primary nutrition/hydration; puree with thin liquids P.O. as tolerated ***  Referring provider: Harika Medina DO Encounter Diagnosis     ICD-10-CM    1. Dysphagia, oropharyngeal phase  R13.12       2. Status post tonsillectomy  Z90.89       3. 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 Patient had a repeat VFSS completed on 2025 (results included below). Patient reported that she is consuming *** meals/day along with her tube feedings (***). Patient is tolerating more P.O. textures and continues to be motivated to no longer require a PEG tube. Patient continues to follow with Nutrition services. Patient continues with c/o globus sensation with more dense and dry food consistencies, most likely related to decreased tongue base retraction and reduced hyolaryngeal elevation/excursion. Patient is also limited by xerostomia. Patient noted that her dysgeusia is improving, as well as her appetite. Patient has better controlled pain, too. Patient would continue to benefit from outpatient skilled Speech Therapy services for further education/training on safe swallowing strategies & aspiration precautions, for continued assessment of patient's tolerance of the safest, least restrictive diet, for therapeutic exercises, to facilitate overall improved quality of life, and for instruction on HEP.     -Safety concerns: Risk for aspiration, Risk for choking, and Risk for inadequate nutrition/ hydration    -Risk factors: History of Head and Neck Cancer, Complex medical history, and GERD      SWALLOWING SAFETY PRECAUTIONS:  PEG  tube for primary nutrition/hydration    -Recommended solids: Mechanical Soft (*moisten if dry) -- avoid/limit mixed textures    -Recommended liquids: Thin    -Recommended medication form: via PEG or crushed with puree (as tolerated) -- *consult with physician to discuss if medication form can be altered    -Frequent/thorough oral care    -Aspiration precautions  *Monitor for signs/symptoms concerning for aspiration (e.g., low grade fever, increase in WBC, change in chest x-ray, increased congestion, increased coughing with P.O. intake)    -Reflux precautions    -Strategies: Small sips and bites when eating, Slow rate, swallow between bites, Alternate liquids and solids, and Other (oral prep set)    -Positioning: Upright position during meals and Upright position at least 30 minutes after P.O. intake    -Compensatory strategies: Effortful swallow, Multiple swallows, and Other:(periodic volitional cough)    -Supervision: Independent     -Referrals: none at this time      Short-term goals: (: 2025)  Patient will receive a videofluoroscopic swallow study (VFSS) to assess her current swallowing function to r/o penetration or aspiration, to determine the safest, least restrictive diet, and to recommended the appropriate rehabilitation exercises. -- MET    Patient will tolerate P.O. trials of her recommended diet with the implementation of safe swallowing strategies without overt s/sx of penetration and/or aspiration during skilled therapy sessions in this certification period. -- PARTIALLY MET/ONGOING    Patient will perform oral motor exercises using IOPI device on lingual muscles to facilitate increased function and strength by 25% for improved swallowing function. -- MET    Patient will independently complete pharyngeal strengthening exercises (e.g., Effortful swallow, Mendelsohn, Evelia) daily to facilitate increased pharyngeal strength and coordination. -- PARTIALLY MET/ONGOING     Long-term  "goals:  Patient will maintain adequate nutrition and hydration with optimum safety and efficacy of swallowing function on P.O. intake without overt s/sx of penetration or aspiration (to be achieved by discharge). -- CONTINUE    Patient will independently utilize compensatory strategies with optimum safety and efficacy of swallowing function on P.O. intake without overt s/sx of penetration or aspiration (to be achieved by discharge). -- CONTINUE      Plan:   Patient would benefit from outpatient skilled Speech Therapy services: Dysphagia therapy (Due to patient's history of dysphagia, she may benefit from neuromuscular electrical stimulation (NMES) (i.e., VitalStim) treatment in conjunction with pharyngeal/laryngeal strengthening exercises and P.O. intake, unless otherwise contraindicated.)    Frequency: 2x weekly  Duration: 2 months    Intervention certification from: 6/22/2025  Intervention certification to: 08/23/2025      Subjective:   ***    Patient's goal(s): \"to learn how to swallow again\" ***    Pain: ***      Objective: ***    Dysphagia UPDATE    Functional Outcome Measurements    Functional Oral Intake Scale (FOIS): 3 - tube supplements with consistent oral intake (improved from 2)    -Eating Assessment Tool (EAT-10) is a self-administered, symptom-specific outcome instrument for dysphagia. It consists of ten statements that a patient rates on a scale of 0-4, with 0=no problem to 4=severe problem. A score of 3 or more is abnormal.     Patient obtained a score of *** today.    04/29/25: 23/40 03/11/25: 32/40 01/06/25: 39/40      -Difficulty swallowing: Solids, Liquids, and Pills     -Current diet (solids): mechanical soft/puree textures 3x/day ***  -Current diet (liquids): Thin   -Current pill intake method: Takes small pills whole with water, takes larger pills crushed in puree ***  -Alternative Feeding Method?: PEG tube (taking 3 cans of TF formula/day and 2 ensure via PEG) ***      Repeat " "videofluoroscopic swallow study (VFSS) completed on 05/29/2025 revealed:  \"...Summary:  Images are on PACS for review.      Oral stage: Mild  WNL for lip closure, slow/careful/frontal/adequate mastication w/ min trial solid. Reduced bolus formation and control w/ liquids. Improved w/ cue for smaller sips and oral prep set. Transfer was adequate. Trace lingual residue. (+ Posterior lingual residue)      Pharyngeal stage:Mod  WNL for prompt swallow. Trace column of contrast between soft palate and PPW (1). Material retropulsed from the tongue base/valleculae inconsistently. ? TB intermittently moving more superior instead of posterior. Partial laryngeal elevation (1). Partial anterior hyoid excursion (1). Partial or absent epiglottic inversion (1,2), Incomplete laryngeal vestibular closure (1). Diminished pharyngeal constriction (1). Complete passage through UES/UES opening (0).  Reduced tongue base retraction/wide column of contrast (3). 1/4th cookie bolus remained at the BOT and multiple swallows were needed to clear. Min chin down and head rotation b/l did not appear effective in clearing. + Transient penetration, trace penetration, and/or aspiration of thin. Aspiration was often 2* spill of retention prior to the secondary swallow. Reduced or eliminated w/ smaller sip and oral prep set.  Cough response was inconsistent. Pt was cued to cough. Appeared to be suppressing her cough x 2. Educated that if she felt the need to cough she should cough. Retention was primarily BOT/Vallecular. Intermittent mild PPW and pyriform retention that cleared w/ secondary swallow. No penetration or aspiration w/ nectar thick, puree, or min solid trial (1/4 cookie).     Per gross esophageal screen: distal stasis that cleared w/ additional cued swallow.         Strategy Trialed y/n  Result +/-   Oral prep set                                      Y                       +  Secondary/multiple swallows Y +   Effortful/hard swallow Y ? " "  Alternation w/ liquids N     Chin down/neck flexion Y -   Volitional cough/throat clear Y +/-   Head turn/rotation Y Possibly to R w/ liquds though sip size was also smaller   Breath hold/cough N     Head tilt N     Other: Smaller sip size                       Y                      +     Recommendations:  Diet:PEG for primary. Purees, (trial small bites soft/moist foods), thin water, other liquids nectar (no aspiration of nectar today). May want to avoid or limit mixed textures for now.   Meds:PEG  Strategies:Small single sips, oral prep set, multiple swallows, do not suppress cough, periodic volitional cough  Frequent/thorough oral care  Upright position  F/u ST tx: Yes per treating slp  PRN Supervision  Aspiration Precautions  Reflux Precautions  Consider consult with: nutrition following  Results reviewed with: pt  Repeat MBS as necessary  If a dedicated assessment of the esophagus is desired:  Consult w/ SLP prior to doing any additional barium studies if the pt aspirated during this study. Consider esophagram/routine barium swallow w/ barium tablet administration, FL Upper GI/UGI, or EGD (Options may be limited if pt cannot stand/maneuver for barium studies)...\"      Treatment: ***    Traditional VitalStim     Channel(s) used: 1, 2   Placement: 2b   Duty Cycle: 57/1 s   Frequency: 80 pulses per second   Phase Duration: 300 microseconds   Treatment Duration: 39 minutes   Min mA level: 5.0 mA   Max mA level: 8.0 mA      Patient tolerated NMES in conjunction with pharyngeal/laryngeal strengthening exercises and P.O. intake. (The patient received instruction on the potential benefits from NMES treatment, including neuromuscular re-education and muscle strengthening.) Skin condition post-NMES: intact.      Patient consumed the following during today’s session: peach yogurt with small pieces of whole peaches and water (thin liquid) via side of cup -- NMES remained on during pharyngeal strengthening exercises (see " below).     Oral containment, mastication, bolus formation/control, AP transfer, and swallow initiation were judged to be WFL. Reduced hyolaryngeal elevation and excursion noted upon palpation. No oral cavity residue was observed. Patient without any c/o globus sensation. Patient demonstrated cough x3 with yogurt and x1 with TL. Voice returned to baseline. No other overt s/sx of penetration/aspiration noted during today's session.      -Patient completed the following pharyngeal strengthening exercises during today's session:  *Effortful swallow: 1 set of 10 reps  *Mendelsohn: 1 set of 10 reps  *Evelia: 1 set of 10 reps      Visit Tracking:  POC   Expires Auth Expiration Date ST Visit Limit   08/23/2025 or 10th visit 12/31/2025 BOMN              Visit/Unit Tracking:  Auth Status Date 05/01/25 05/06/25 05/08/25 05/13/25 05/15/25 05/20/25 06/03/25 06/05/25 06/12/25 ***   Not required Used 1 2 3 4 5 6 7  8 9 10     Remaining 9 8 7 6 5 4 3  2 1 0

## 2025-06-23 ENCOUNTER — APPOINTMENT (OUTPATIENT)
Dept: SPEECH THERAPY | Facility: CLINIC | Age: 70
End: 2025-06-23
Attending: INTERNAL MEDICINE
Payer: MEDICARE

## 2025-06-23 DIAGNOSIS — Z90.89 STATUS POST TONSILLECTOMY: ICD-10-CM

## 2025-06-23 DIAGNOSIS — C09.9 RIGHT TONSILLAR SQUAMOUS CELL CARCINOMA (HCC): ICD-10-CM

## 2025-06-23 DIAGNOSIS — R13.12 DYSPHAGIA, OROPHARYNGEAL PHASE: Primary | ICD-10-CM

## 2025-06-24 ENCOUNTER — APPOINTMENT (OUTPATIENT)
Dept: SPEECH THERAPY | Facility: CLINIC | Age: 70
End: 2025-06-24
Attending: INTERNAL MEDICINE
Payer: MEDICARE

## 2025-06-24 ENCOUNTER — OFFICE VISIT (OUTPATIENT)
Dept: HEMATOLOGY ONCOLOGY | Facility: CLINIC | Age: 70
End: 2025-06-24
Payer: MEDICARE

## 2025-06-24 VITALS
HEIGHT: 65 IN | WEIGHT: 156 LBS | RESPIRATION RATE: 16 BRPM | SYSTOLIC BLOOD PRESSURE: 110 MMHG | HEART RATE: 90 BPM | OXYGEN SATURATION: 98 % | DIASTOLIC BLOOD PRESSURE: 70 MMHG | BODY MASS INDEX: 25.99 KG/M2 | TEMPERATURE: 97.6 F

## 2025-06-24 DIAGNOSIS — C78.7 METASTATIC COLON CANCER TO LIVER (HCC): Primary | ICD-10-CM

## 2025-06-24 DIAGNOSIS — C18.9 METASTATIC COLON CANCER TO LIVER (HCC): Primary | ICD-10-CM

## 2025-06-24 DIAGNOSIS — C09.9 RIGHT TONSILLAR SQUAMOUS CELL CARCINOMA (HCC): ICD-10-CM

## 2025-06-24 DIAGNOSIS — Z51.81 THERAPEUTIC DRUG MONITORING: ICD-10-CM

## 2025-06-24 PROCEDURE — G2211 COMPLEX E/M VISIT ADD ON: HCPCS | Performed by: INTERNAL MEDICINE

## 2025-06-24 PROCEDURE — 99214 OFFICE O/P EST MOD 30 MIN: CPT | Performed by: INTERNAL MEDICINE

## 2025-06-24 RX ORDER — SODIUM CHLORIDE 9 MG/ML
20 INJECTION, SOLUTION INTRAVENOUS ONCE
Status: CANCELLED | OUTPATIENT
Start: 2025-06-25

## 2025-06-24 NOTE — PROGRESS NOTES
Medical Oncology: Outpatient Progress Note:       Name: Maira Moura      : 1955      MRN: [id number]                 Encounter Provider: Harika Medina DO  Encounter Date: 2025   Encounter department: [facility]  Assessment & Plan  Metastatic colon cancer to liver (HCC)  Patient is a 70-year-old postmenopausal female, with a complex Oncologic history, including synchronous de naveed metastatic adenocarcinoma of the mid-ascending colon (Complicated by partial-obstruction requiring right hemicolectomy on 2024) with biopsy-proven oligometastatic disease to the liver (Status-post cryoablation on 2024) with continued disease progression through multiple-lines of therapy (Currently on systemic-therapy with Bevacizumab 5 mg/kg every 2-weeks, plus Lonsurf); who presents today for interval follow-up. As above, due to interval progression of disease in May 2025, patient was recommended change in therapy to combination Trifluridine and Tipiracil (Lonsurf) 35 mg/m2 BID every other week (Days 1 to 5, and Days 8 to 12), of a 28-day cycle, and Bevacizumab 5 mg/kg every 2-weeks. Patient was initiated on the above-mentioned beginning on 2025 (Cycle 1). She is anticipating initiation of Cycle 2 on 2025. In the interim, patient has established with Cardio-Oncology. Blood Pressure remains adequately controlled on single-agent Losartan; with low-normal measurements noted. Further medication adjustments based on continued monitoring.     On evaluation this morning, patient is clinically stable. Repeat Blood Pressure: 110/70. Patient is tolerating systemic-therapy appropriately, with minimal treatment-related toxicities including fatigue. Repeat laboratory-parameters show downtrending normocytic anemia (Hemoglobin: 8.3 MCV: 95 Baseline Hemoglobin: 9.1 to 9.8); however, are otherwise within normal limits. Last Urinalysis was negative  for proteinuria. Given the above-mentioned, will continue with treatment, as above. Recommend close interval follow-up in approximately 4-weeks for reassessment of treatment-tolerance. Plan to obtain re-staging imaging after at least 2-3 months of treatment. Patient expressed understanding and agreement with the outlined plan.    Trifluridine and Tipiracil plus Bevacizumab per NCCN Guidelines:  Administered on a 28-day cycle, until disease progression or toxicity.  Trifluridine and Tipiracil 35 mg/m2 PO BID on Days 1-5, and 8-12.  Note: Patient will be prescribed 60 mg BID.  Bevacizumab 5 mg/kg IV on Days 1 and 15.    Summary of Recommendations:  Continue twice-weekly intravenous hydration, with anti-emetic therapy.  Start scheduled Omeprazole 20 mg Daily, for treatment-associated gastroesophageal reflux.  Proceed to Cycle 2 of Lonsurf plus Bevacizumab, as planned:  Cycle 2 Day 1 is scheduled to start tomorrow, June 25th, 2025.  Note: Will continue to monitor Urine Studies for proteinuria.  Recommend close interval follow-up in 4-weeks to reassess treatment-tolerance.  Tentatively plan for re-staging imaging after approximately 2-3 months of treatment.       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 On completion of definitive chemoradiation, repeat PET-CT was obtained on December 27th, 2024; and demonstrated interval near-complete metabolic response to therapy. Repeat CT Neck was obtained on June 17th, 2025, shows stable post-treatment changes with no residual or recurrent tonsillar mass. Stable hypoenhancing right level 2A cervical lymph node, and slight decrease in size of right level 2B cervical lymph node noted. Will continue surveillance, accordingly.     Summary  of Recommendations:  Continue active-surveillance:  Follow-up with Radiation Oncology, and Otolaryngology, as scheduled.  Continue surveillance-nasolaryngoscopy per Otolaryngology.  Recommend close interval follow-up in approximately 2-weeks, as above.       History of Present Illness   Chief Complaint: Follow-up.  Interval Events: Maira Moura is a 70-year-old postmenopausal female, with a complex Oncologic history, including synchronous de naveed metastatic adenocarcinoma of the mid-ascending colon (Complicated by partial-obstruction requiring right hemicolectomy on March 15th, 2024) with biopsy-proven oligometastatic disease to the liver (Status-post cryoablation on June 3rd, 2024) with continued disease progression through multiple-lines of therapy; who presents today for interval follow-up. On evaluation this morning, patient is clinically well. She is tolerating systemic-therapy well, without significant treatment-related toxicities. This is with the exception of fatigue, and gastroesophageal-reflux. Patient has not been administering her proton-pump inhibitor on a scheduled basis, so we discussed starting this. Patient's appetite remains poor, and she is not taking a significant amount of food-products by mouth. She is supplementing her nutrition with her PEG-tube (e.g. 2-cans/day). Nutrition continues to follow along with her. Bowel-habits remain regular; albeit loose, however, controlled with administration of Imodium. Patient continues to benefit from twice-weekly hydration. She has no further complaints or concerns today.    Oncology History   Cancer Staging   Malignant neoplasm of right female breast (HCC)  Staging form: Breast, AJCC 8th Edition  - Pathologic: Stage IA (pT2, pN0, cM0, G2, ER: Positive, NE: Positive, HER2: Negative) - Signed by Rosalva Drake MD on 1/11/2019  Histologic grading system: 3 grade system    Metastatic colon cancer to liver (HCC)  Staging form: Colon and Rectum, AJCC 8th  Edition  - Clinical stage from 7/13/2023: cT3, cN0, pM1 - Signed by Harika Medina DO on 9/28/2023  Total positive nodes: 0  - Pathologic: pT3, pN0, cM1 - Signed by Vickie Israel MD on 4/3/2024  Total positive nodes: 0    Right tonsillar squamous cell carcinoma (HCC)  Staging form: Pharynx - Oropharynx, AJCC 8th Edition  - Clinical stage from 7/27/2023: Stage III (cT1, cN3, cM0, p16+) - Signed by Evan Plummer MD on 7/27/2023  Stage prefix: Initial diagnosis  Oncology History   Malignant neoplasm of right female breast (HCC)   11/23/2018 Initial Diagnosis    Malignant neoplasm of right female breast (HCC)     11/23/2018 Biopsy    Rt Breast US BX 1100 8cmfn, 4 passes 12g Marquee:  - Invasive breast carcinoma of no special type (ductal NST/invasive ductal carcinoma).  - grade 2  - %  - ND 95%  - HER2 negative     12/20/2018 Surgery    Right partial mastectomy and SLN biopsy:  Infiltrating ductal carcinoma, Grade 2/3, felt to be between 2.0 cm and 2.5 cm in greatest dimension  - No invasive tumor is seen at inked margins, but there is a focus of DCIS seen within approximately 1mm of the inked medial margin  - no LVI  - no LCIS  - 0/2 LN's  at least Stage IIA - pT2, pN0, cM0, G2.    - Dr Coronado     12/20/2018 -  Cancer Staged    Stage IIA - pT2, pN0, cM0, G2.       1/4/2019 Genomic Testing    Oncotype DX score: 13     2/1/2019 -  Hormone Therapy    anastrozole 1 mg daily as adjuvant endocrine therapy    - Dr Daley       2/14/2019 - 4/3/2019 Radiation    Course: C1    Plan ID Energy Fractions Dose per Fraction (cGy) Dose Correction (cGy) Total Dose Delivered (cGy) Elapsed Days   R Breast 10X/6X 25 / 25 200 0 5,000 40   R BRST BOOST 16E 5 / 5 200 0 1,000 7      Treatment dates:  C1: 2/14/2019 - 4/3/2019       Metastatic colon cancer to liver (HCC)   7/6/2023 Genetic Testing    CARIS Results:   KRAS Pathogenic Variant Exon 2  p.G12D : lack of benefit of cetuximab, panitumumab Level 2   No other  "actionable mutation; MSI stable; TMB low   MiFOLOXAi Results: \"Decreased Benefit of FOLFOX + Bevacizumab in first line metastatic CRC.  This patient may achieve improved results by receiving an alternative to FOLFOX, such as FOLFIRI, as their initial regimen. As an adjustment to frontline FOLFOXIRI following toxicity: This patient may achieve improved results by removing the oxaliplatin portion of their regimen\".         7/11/2023 Initial Diagnosis    Metastatic colon cancer to liver (HCC)     7/13/2023 -  Cancer Staged    Staging form: Colon and Rectum, AJCC 8th Edition  - Clinical stage from 7/13/2023: cT3, cN0, pM1 - Signed by Harika Medina DO on 9/28/2023  Total positive nodes: 0       7/31/2023 - 8/1/2024 Chemotherapy    cyanocobalamin, 1,000 mcg, Intramuscular, Every 30 days, 7 of 9 cycles  Administration: 1,000 mcg (5/9/2024), 1,000 mcg (5/24/2024), 1,000 mcg (6/20/2024), 1,000 mcg (7/3/2024), 1,000 mcg (7/18/2024), 1,000 mcg (8/1/2024)  potassium chloride, 20 mEq, Intravenous, Once, 2 of 2 cycles  Administration: 20 mEq (11/6/2023), 20 mEq (11/20/2023)  alteplase (CATHFLO), 2 mg, Intracatheter, Every 1 Minute as needed, 21 of 23 cycles  Administration: 2 mg (1/3/2024)  pegfilgrastim (NEULASTA), 6 mg, Subcutaneous, Once, 12 of 12 cycles  Administration: 6 mg (10/25/2023), 6 mg (11/8/2023), 6 mg (11/22/2023), 6 mg (12/6/2023), 6 mg (12/20/2023), 6 mg (1/12/2024), 6 mg (1/25/2024), 6 mg (4/25/2024), 6 mg (5/9/2024), 6 mg (5/24/2024), 6 mg (6/20/2024)  fluorouracil (ADRUCIL), 895 mg, Intravenous, Once, 21 of 23 cycles  Dose modification: 320 mg/m2 (original dose 400 mg/m2, Cycle 11)  Administration: 900 mg (7/31/2023), 900 mg (8/14/2023), 900 mg (8/28/2023), 900 mg (9/11/2023), 900 mg (9/25/2023), 900 mg (10/9/2023), 900 mg (10/23/2023), 895 mg (11/6/2023), 895 mg (11/20/2023), 895 mg (12/4/2023), 700 mg (12/18/2023), 680 mg (1/10/2024), 680 mg (1/23/2024), 625 mg (4/23/2024), 625 mg (5/7/2024), 625 mg " (5/22/2024), 625 mg (6/4/2024), 625 mg (6/18/2024), 625 mg (7/1/2024), 625 mg (7/16/2024), 625 mg (7/30/2024)  nivolumab (OPDIVO) IVPB, 240 mg (100 % of original dose 240 mg), Intravenous, Once, 13 of 15 cycles  Dose modification: 240 mg (original dose 240 mg, Cycle 9)  Administration: 240 mg (11/20/2023), 240 mg (12/4/2023), 240 mg (12/18/2023), 240 mg (1/10/2024), 240 mg (1/23/2024), 240 mg (4/23/2024), 240 mg (5/7/2024), 240 mg (5/22/2024), 240 mg (6/4/2024), 240 mg (6/18/2024), 240 mg (7/1/2024), 240 mg (7/16/2024), 240 mg (7/30/2024)  leucovorin calcium IVPB, 896 mg, Intravenous, Once, 21 of 23 cycles  Administration: 900 mg (7/31/2023), 900 mg (8/14/2023), 900 mg (8/28/2023), 900 mg (9/11/2023), 900 mg (9/25/2023), 900 mg (10/9/2023), 900 mg (10/23/2023), 900 mg (11/6/2023), 900 mg (11/20/2023), 900 mg (12/4/2023), 900 mg (12/18/2023), 850 mg (1/10/2024), 850 mg (1/23/2024), 800 mg (4/23/2024), 800 mg (5/7/2024), 800 mg (5/22/2024), 800 mg (6/4/2024), 800 mg (6/18/2024), 800 mg (7/1/2024), 800 mg (7/16/2024), 800 mg (7/30/2024)  oxaliplatin (ELOXATIN) chemo infusion, 85 mg/m2 = 190.4 mg, Intravenous, Once, 12 of 12 cycles  Dose modification: 68 mg/m2 (original dose 85 mg/m2, Cycle 11, Reason: Other (Must fill in a comment), Comment: increased fatigue)  Administration: 190.4 mg (7/31/2023), 190.4 mg (8/14/2023), 200 mg (8/28/2023), 200 mg (9/11/2023), 200 mg (9/25/2023), 200 mg (10/9/2023), 200 mg (10/23/2023), 200 mg (11/6/2023), 200 mg (11/20/2023), 190.4 mg (12/4/2023), 150 mg (12/18/2023), 150 mg (1/10/2024)  fluorouracil (ADRUCIL) ambulatory infusion Soln, 1,200 mg/m2/day = 5,375 mg, Intravenous, Over 46 hours, 21 of 23 cycles     9/26/2023 -  Cancer Staged    Staging form: Colon and Rectum, AJCC 8th Edition  - Pathologic: pT3, pN0, cM1 - Signed by Vickie Israel MD on 4/3/2024  Total positive nodes: 0       3/12/2024 Surgery    A. Terminal ileum, appendix, right colon (right hemicolectomy):  -  Adenocarcinoma (5.2cm)  - Forty-nine (49) lymph nodes negative for carcinoma (0/49)    - Acellular mucin present in one (1) lymph node   - See staging synoptic (ypT3N0)     1/8/2025 - 5/16/2025 Chemotherapy    fluorouracil (ADRUCIL), 400 mg/m2 = 770 mg, 2 of 2 cycles  Administration: 770 mg (1/8/2025), 770 mg (1/22/2025)  irinotecan (CAMPTOSAR) chemo infusion, 173 mg (50 % of original dose 180 mg/m2), 8 of 11 cycles  Dose modification: 90 mg/m2 (original dose 180 mg/m2, Cycle 1, Reason: Anticipated Tolerance, Comment: 50% dose reduction due to pre-existing diarrhea)  Administration: 180 mg (1/8/2025), 180 mg (1/22/2025), 160 mg (3/5/2025), 160 mg (3/19/2025), 160 mg (4/2/2025), 160 mg (4/16/2025), 160 mg (4/30/2025), 160 mg (5/14/2025)  leucovorin calcium IVPB, 768 mg, 2 of 2 cycles  Administration: 800 mg (1/8/2025), 800 mg (1/22/2025)  fluorouracil (ADRUCIL) ambulatory infusion Soln, 1,200 mg/m2/day = 4,610 mg, 8 of 11 cycles     6/11/2025 -  Chemotherapy    bevacizumab (AVASTIN) IVPB, 5 mg/kg = 362.5 mg (50 % of original dose 10 mg/kg), 1 of 6 cycles  Dose modification: 5 mg/kg (original dose 10 mg/kg, Cycle 1, Reason: Other (Must fill in a comment), Comment: per protocol)  Administration: 362.5 mg (6/11/2025)     Right tonsillar squamous cell carcinoma (HCC)   7/27/2023 Initial Diagnosis    Right tonsillar squamous cell carcinoma (HCC)     7/27/2023 -  Cancer Staged    Staging form: Pharynx - Oropharynx, AJCC 8th Edition  - Clinical stage from 7/27/2023: Stage III (cT1, cN3, cM0, p16+) - Signed by Evan Plummer MD on 7/27/2023  Stage prefix: Initial diagnosis       9/16/2024 - 11/13/2024 Radiation      Plan ID Energy Fractions Dose per Fraction (cGy) Dose Correction (cGy) Total Dose Delivered (cGy) Elapsed Days   Head_Neck 6X-FFF 35 / 35 200 0 7,000 58    C2: 9/16/2024 - 11/13/2024 9/17/2024 - 10/21/2024 Chemotherapy    alteplase (CATHFLO), 2 mg, Intracatheter, Every 1 Minute as needed, 6 of 6  cycles  Administration: 2 mg (10/21/2024)  CISplatin (PLATINOL) infusion, 70 mg (original dose 40 mg/m2), Intravenous, Once, 6 of 6 cycles  Dose modification: 70 mg (original dose 40 mg/m2, Cycle 1, Reason: Anticipated Tolerance), 30 mg/m2 (original dose 40 mg/m2, Cycle 4, Reason: Neuropathy, Comment: dose reduction to 30 mg/m2 due to neuropathy)  Administration: 70 mg (9/17/2024), 70 mg (9/23/2024), 70 mg (9/30/2024), 59.1 mg (10/7/2024), 59.1 mg (10/14/2024), 59.1 mg (10/21/2024)  sodium chloride, 500 mL, Intravenous, Once, 6 of 6 cycles  Administration: 500 mL (9/17/2024), 500 mL (9/17/2024), 500 mL (9/23/2024), 500 mL (9/23/2024), 500 mL (9/30/2024), 500 mL (9/30/2024), 500 mL (10/7/2024), 500 mL (10/7/2024), 500 mL (10/14/2024), 500 mL (10/14/2024), 500 mL (10/21/2024)  fosaprepitant (EMEND) IVPB, 150 mg, Intravenous, Once, 6 of 6 cycles  Administration: 150 mg (9/17/2024), 150 mg (9/23/2024), 150 mg (9/30/2024), 150 mg (10/7/2024), 150 mg (10/14/2024), 150 mg (10/21/2024)  IVPB builder, , Intravenous, Once, 1 of 1 cycle  Administration: Unknown dose (10/21/2024)        Review of Systems  Review of Systems:  All systems reviewed and negative except otherwise listed in the History of Present Illness.      Objective   Vitals:    06/24/25 0913   BP: 110/70   Pulse: 90   Resp: 16   Temp: 97.6 °F (36.4 °C)   SpO2: 98%     ECOG   2-Points.    Physical Exam:  General: Alert, and oriented; Pleasant, and conversational; Well-Appearing Female  HEENT: Atraumatic, and normocephalic; PERRLA; EOMI; Moist mucosal membranes  Cardiovascular: Tachycardia Noted on Vital-Signs (Regular Rate and Rhythm on Auscultation); No murmurs, rubs, or gallops appreciated; No peripheral edema  Respiratory: Clear to auscultation bilaterally; Adequate aeration; No oxygen requirement  Abdomen: Soft, non-tender; Non-distended; No organomegaly; Bowel sounds present  Extremities: No obvious deformities; No peripheral edema; Moves all  Neurologic:  Appropriately alert, and oriented to Person, Place, and Time; No focal neurologic deficits    Labs: I have reviewed the following labs:  Lab Results   Component Value Date/Time    WBC 8.17 06/18/2025 12:28 PM    RBC 2.62 (L) 06/18/2025 12:28 PM    Hemoglobin 8.3 (L) 06/18/2025 12:28 PM    Hematocrit 24.9 (L) 06/18/2025 12:28 PM    MCV 95 06/18/2025 12:28 PM    MCH 31.7 06/18/2025 12:28 PM    RDW 13.9 06/18/2025 12:28 PM    Platelets 313 06/18/2025 12:28 PM    Segmented % 73 06/18/2025 12:28 PM    Lymphocytes % 8 (L) 06/18/2025 12:28 PM    Monocytes % 15 (H) 06/18/2025 12:28 PM    Eosinophils Relative 3 06/18/2025 12:28 PM    Basophils Relative 1 06/18/2025 12:28 PM    Immature Grans % 0 06/18/2025 12:28 PM    Absolute Neutrophils 6.05 06/18/2025 12:28 PM     Lab Results   Component Value Date/Time    Potassium 4.2 06/18/2025 12:28 PM    Chloride 100 06/18/2025 12:28 PM    CO2 27 06/18/2025 12:28 PM    BUN 26 (H) 06/18/2025 12:28 PM    Creatinine 1.02 06/18/2025 12:28 PM    Glucose, Fasting 102 (H) 10/30/2024 04:34 AM    Calcium 9.1 06/18/2025 12:28 PM    Corrected Calcium 9.8 06/18/2025 12:28 PM    AST 41 (H) 06/18/2025 12:28 PM    ALT 17 06/18/2025 12:28 PM    Alkaline Phosphatase 106 (H) 06/18/2025 12:28 PM    Total Protein 7.3 06/18/2025 12:28 PM    Albumin 3.1 (L) 06/18/2025 12:28 PM    Total Bilirubin 0.31 06/18/2025 12:28 PM    eGFR 56 06/18/2025 12:28 PM     Patient was seen and discussed with attending physician, ALAINA Steve D.O.  Hematology-Oncology Fellow (PGY-5)

## 2025-06-24 NOTE — ASSESSMENT & PLAN NOTE
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 On completion of definitive chemoradiation, repeat PET-CT was obtained on December 27th, 2024; and demonstrated interval near-complete metabolic response to therapy. Repeat CT Neck was obtained on June 17th, 2025, shows stable post-treatment changes with no residual or recurrent tonsillar mass. Stable hypoenhancing right level 2A cervical lymph node, and slight decrease in size of right level 2B cervical lymph node noted. Will continue surveillance, accordingly.     Summary of Recommendations:  Continue active-surveillance:  Follow-up with Radiation Oncology, and Otolaryngology, as scheduled.  Continue surveillance-nasolaryngoscopy per Otolaryngology.  Recommend close interval follow-up in approximately 2-weeks, as above.

## 2025-06-24 NOTE — ASSESSMENT & PLAN NOTE
Patient is a 70-year-old postmenopausal female, with a complex Oncologic history, including synchronous de naveed metastatic adenocarcinoma of the mid-ascending colon (Complicated by partial-obstruction requiring right hemicolectomy on March 15th, 2024) with biopsy-proven oligometastatic disease to the liver (Status-post cryoablation on June 3rd, 2024) with continued disease progression through multiple-lines of therapy (Currently on systemic-therapy with Bevacizumab 5 mg/kg every 2-weeks, plus Lonsurf); who presents today for interval follow-up. As above, due to interval progression of disease in May 2025, patient was recommended change in therapy to combination Trifluridine and Tipiracil (Lonsurf) 35 mg/m2 BID every other week (Days 1 to 5, and Days 8 to 12), of a 28-day cycle, and Bevacizumab 5 mg/kg every 2-weeks. Patient was initiated on the above-mentioned beginning on June 11th, 2025 (Cycle 1). She is anticipating initiation of Cycle 2 on June 25th, 2025. In the interim, patient has established with Cardio-Oncology. Blood Pressure remains adequately controlled on single-agent Losartan; with low-normal measurements noted. Further medication adjustments based on continued monitoring.     On evaluation this morning, patient is clinically stable. Repeat Blood Pressure: 110/70. Patient is tolerating systemic-therapy appropriately, with minimal treatment-related toxicities including fatigue. Repeat laboratory-parameters show downtrending normocytic anemia (Hemoglobin: 8.3 MCV: 95 Baseline Hemoglobin: 9.1 to 9.8); however, are otherwise within normal limits. Last Urinalysis was negative for proteinuria. Given the above-mentioned, will continue with treatment, as above. Recommend close interval follow-up in approximately 4-weeks for reassessment of treatment-tolerance. Plan to obtain re-staging imaging after at least 2-3 months of treatment. Patient expressed understanding and agreement with the outlined  plan.    Trifluridine and Tipiracil plus Bevacizumab per NCCN Guidelines:  Administered on a 28-day cycle, until disease progression or toxicity.  Trifluridine and Tipiracil 35 mg/m2 PO BID on Days 1-5, and 8-12.  Note: Patient will be prescribed 60 mg BID.  Bevacizumab 5 mg/kg IV on Days 1 and 15.    Summary of Recommendations:  Continue twice-weekly intravenous hydration, with anti-emetic therapy.  Start scheduled Omeprazole 20 mg Daily, for treatment-associated gastroesophageal reflux.  Proceed to Cycle 2 of Lonsurf plus Bevacizumab, as planned:  Cycle 2 Day 1 is scheduled to start tomorrow, June 25th, 2025.  Note: Will continue to monitor Urine Studies for proteinuria.  Recommend close interval follow-up in 4-weeks to reassess treatment-tolerance.  Tentatively plan for re-staging imaging after approximately 2-3 months of treatment.

## 2025-06-25 ENCOUNTER — HOSPITAL ENCOUNTER (OUTPATIENT)
Dept: INFUSION CENTER | Facility: CLINIC | Age: 70
Discharge: HOME | End: 2025-06-25
Attending: INTERNAL MEDICINE
Payer: MEDICARE

## 2025-06-25 ENCOUNTER — NUTRITION (OUTPATIENT)
Dept: NUTRITION | Facility: CLINIC | Age: 70
End: 2025-06-25

## 2025-06-25 VITALS
DIASTOLIC BLOOD PRESSURE: 83 MMHG | BODY MASS INDEX: 25.82 KG/M2 | RESPIRATION RATE: 16 BRPM | HEART RATE: 109 BPM | HEIGHT: 65 IN | SYSTOLIC BLOOD PRESSURE: 119 MMHG | WEIGHT: 154.98 LBS | TEMPERATURE: 96.2 F

## 2025-06-25 DIAGNOSIS — C18.9 METASTATIC COLON CANCER TO LIVER (HCC): ICD-10-CM

## 2025-06-25 DIAGNOSIS — C78.7 METASTATIC COLON CANCER TO LIVER (HCC): ICD-10-CM

## 2025-06-25 DIAGNOSIS — Z71.3 NUTRITIONAL COUNSELING: Primary | ICD-10-CM

## 2025-06-25 DIAGNOSIS — Z51.81 THERAPEUTIC DRUG MONITORING: ICD-10-CM

## 2025-06-25 DIAGNOSIS — E86.0 DEHYDRATION: Primary | ICD-10-CM

## 2025-06-25 LAB
ALBUMIN SERPL BCG-MCNC: 2.9 G/DL (ref 3.5–5)
ALP SERPL-CCNC: 90 U/L (ref 34–104)
ALT SERPL W P-5'-P-CCNC: 8 U/L (ref 7–52)
ANION GAP SERPL CALCULATED.3IONS-SCNC: 8 MMOL/L (ref 4–13)
AST SERPL W P-5'-P-CCNC: 18 U/L (ref 13–39)
BACTERIA UR QL AUTO: ABNORMAL /HPF
BASOPHILS # BLD AUTO: 0.04 THOUSANDS/ÂΜL (ref 0–0.1)
BASOPHILS NFR BLD AUTO: 1 % (ref 0–1)
BILIRUB SERPL-MCNC: 0.24 MG/DL (ref 0.2–1)
BILIRUB UR QL STRIP: NEGATIVE
BUN SERPL-MCNC: 26 MG/DL (ref 5–25)
CALCIUM ALBUM COR SERPL-MCNC: 9.3 MG/DL (ref 8.3–10.1)
CALCIUM SERPL-MCNC: 8.4 MG/DL (ref 8.4–10.2)
CHLORIDE SERPL-SCNC: 101 MMOL/L (ref 96–108)
CLARITY UR: CLEAR
CO2 SERPL-SCNC: 25 MMOL/L (ref 21–32)
COLOR UR: ABNORMAL
CREAT SERPL-MCNC: 0.85 MG/DL (ref 0.6–1.3)
EOSINOPHIL # BLD AUTO: 0.09 THOUSAND/ÂΜL (ref 0–0.61)
EOSINOPHIL NFR BLD AUTO: 1 % (ref 0–6)
ERYTHROCYTE [DISTWIDTH] IN BLOOD BY AUTOMATED COUNT: 13.9 % (ref 11.6–15.1)
GFR SERPL CREATININE-BSD FRML MDRD: 70 ML/MIN/1.73SQ M
GLUCOSE SERPL-MCNC: 109 MG/DL (ref 65–140)
GLUCOSE UR STRIP-MCNC: NEGATIVE MG/DL
HCT VFR BLD AUTO: 23.6 % (ref 34.8–46.1)
HGB BLD-MCNC: 7.6 G/DL (ref 11.5–15.4)
HGB UR QL STRIP.AUTO: NEGATIVE
HYALINE CASTS #/AREA URNS LPF: ABNORMAL /LPF
IMM GRANULOCYTES # BLD AUTO: 0.04 THOUSAND/UL (ref 0–0.2)
IMM GRANULOCYTES NFR BLD AUTO: 1 % (ref 0–2)
KETONES UR STRIP-MCNC: NEGATIVE MG/DL
LEUKOCYTE ESTERASE UR QL STRIP: ABNORMAL
LYMPHOCYTES # BLD AUTO: 0.42 THOUSANDS/ÂΜL (ref 0.6–4.47)
LYMPHOCYTES NFR BLD AUTO: 6 % (ref 14–44)
MAGNESIUM SERPL-MCNC: 2 MG/DL (ref 1.9–2.7)
MCH RBC QN AUTO: 30.9 PG (ref 26.8–34.3)
MCHC RBC AUTO-ENTMCNC: 32.2 G/DL (ref 31.4–37.4)
MCV RBC AUTO: 96 FL (ref 82–98)
MONOCYTES # BLD AUTO: 0.48 THOUSAND/ÂΜL (ref 0.17–1.22)
MONOCYTES NFR BLD AUTO: 7 % (ref 4–12)
MUCOUS THREADS UR QL AUTO: ABNORMAL
NEUTROPHILS # BLD AUTO: 5.9 THOUSANDS/ÂΜL (ref 1.85–7.62)
NEUTS SEG NFR BLD AUTO: 84 % (ref 43–75)
NITRITE UR QL STRIP: NEGATIVE
NON-SQ EPI CELLS URNS QL MICRO: ABNORMAL /HPF
NRBC BLD AUTO-RTO: 0 /100 WBCS
PH UR STRIP.AUTO: 5.5 [PH]
PLATELET # BLD AUTO: 373 THOUSANDS/UL (ref 149–390)
PMV BLD AUTO: 10 FL (ref 8.9–12.7)
POTASSIUM SERPL-SCNC: 4.1 MMOL/L (ref 3.5–5.3)
PROT SERPL-MCNC: 6.5 G/DL (ref 6.4–8.4)
PROT UR STRIP-MCNC: NEGATIVE MG/DL
RBC # BLD AUTO: 2.46 MILLION/UL (ref 3.81–5.12)
RBC #/AREA URNS AUTO: ABNORMAL /HPF
SODIUM SERPL-SCNC: 134 MMOL/L (ref 135–147)
SP GR UR STRIP.AUTO: 1.01 (ref 1–1.03)
UROBILINOGEN UR STRIP-ACNC: <2 MG/DL
WBC # BLD AUTO: 6.97 THOUSAND/UL (ref 4.31–10.16)
WBC #/AREA URNS AUTO: ABNORMAL /HPF

## 2025-06-25 PROCEDURE — 83735 ASSAY OF MAGNESIUM: CPT | Performed by: INTERNAL MEDICINE

## 2025-06-25 PROCEDURE — 80053 COMPREHEN METABOLIC PANEL: CPT | Performed by: INTERNAL MEDICINE

## 2025-06-25 PROCEDURE — 96413 CHEMO IV INFUSION 1 HR: CPT

## 2025-06-25 PROCEDURE — 85025 COMPLETE CBC W/AUTO DIFF WBC: CPT | Performed by: INTERNAL MEDICINE

## 2025-06-25 PROCEDURE — 96375 TX/PRO/DX INJ NEW DRUG ADDON: CPT

## 2025-06-25 PROCEDURE — 81001 URINALYSIS AUTO W/SCOPE: CPT

## 2025-06-25 PROCEDURE — 36593 DECLOT VASCULAR DEVICE: CPT

## 2025-06-25 RX ORDER — SODIUM CHLORIDE 9 MG/ML
20 INJECTION, SOLUTION INTRAVENOUS ONCE
Status: COMPLETED | OUTPATIENT
Start: 2025-06-25 | End: 2025-06-25

## 2025-06-25 RX ADMIN — ALTEPLASE 2 MG: 2.2 INJECTION, POWDER, LYOPHILIZED, FOR SOLUTION INTRAVENOUS at 12:23

## 2025-06-25 RX ADMIN — SODIUM CHLORIDE 1000 ML: 0.9 INJECTION, SOLUTION INTRAVENOUS at 13:33

## 2025-06-25 RX ADMIN — BEVACIZUMAB 362.5 MG: 400 INJECTION, SOLUTION INTRAVENOUS at 14:02

## 2025-06-25 RX ADMIN — ONDANSETRON 8 MG: 2 INJECTION INTRAMUSCULAR; INTRAVENOUS at 13:37

## 2025-06-25 RX ADMIN — SODIUM CHLORIDE 20 ML/HR: 0.9 INJECTION, SOLUTION INTRAVENOUS at 13:32

## 2025-06-25 NOTE — PATIENT INSTRUCTIONS
Nutrition Rx & Recommendations:  Diet: enteral nutrition as 1' source of nutrition, pureed diet-high calorie high protein  Stay hydrated by sipping fluids of choice/tolerance throughout the day.   Follow proper oral care; Try baking soda/salt water rinse recipe (mix 3/4 tsp salt + 1 tsp baking soda + 1 qt water; rinse with plain water after using) in Eating Hints book (pg 18).  Brush your teeth before/after meals & before bed.  Weigh yourself regularly. If you notice weight loss, make an effort to increase your daily food/calorie intake. If you continue to notice loss after these efforts, reach out to your dietitian to establish a plan to stabilize weight.   Continue with Ensure Plus/Complete 2 times daily. try drinking orally, but OK to put through PEG if tolerated better  Choose liquids with calories: whole milk, Fairlife milk (higher protein/lactose-free milk), chocolate milk, drinkable yogurt, kefir, 100% fruit juice, diluted juice, bone broth (higher protein broth), creamy soups, sports drinks (Gatorade, Poweraide, Pedialyte, etc.), Italian ice, popsicles, milkshakes, smoothies, oral nutrition supplements (Ensure, Boost, etc.), gelatin/Jello, etc.  Pureed Foods:  To puree foods: placed chopped food into  or . Add enough liquid to cover the blade. Blend until food is smooth and free of chunks.  You may add non-fat milk, clear broths or non-fat gravies when blending foods.  You can use baby food as long as you have a pureed protein source as the main part of your meal.  Avoid items with added sugars (read food labels).  Food ideas:  Applesauce or canned fruit blended  Scrambled whole egg, egg white or egg substitutes  Greek yogurt, yogurt (plain or flavored)   pudding  Light cottage cheese  Ricotta cheese, part-skim  Vegetables (cook until soft & tender, without peels or skins) blended, or can use baby food (vegetables)  Mashed potatoes (white or sweet potato)  Oatmeal, cream of wheat cereal  (made with non-fat milk)  Soups- can be blended in  or , or with immersion       TF plan -   Continue at least 2 cartons of Shay[employer]ax [employer] 1.5 daily  If PO decreases and weight drops more, consider increasing to goal TF which is:  [employer] 1.5 4 cartons daily (1300 mL total volume, 2000 calories, 96g protein, and 910 mL free water)  Contact RD for substitutions  Call [facility] when you have 10 days left of TF supplies: [phone], option 3 (customer support).      Follow Up Plan: during infusion on 6/25/25   Recommend Referral to Other Providers: none at this time

## 2025-06-25 NOTE — PLAN OF CARE
Problem: Potential for Falls  Goal: Patient will remain free of falls  Description: INTERVENTIONS:  - Educate patient/family on patient safety including physical limitations  - Instruct patient to call for assistance with activity   - Consider consulting OT/PT to assist with strengthening/mobility based on AM PAC & JH-HLM score  - Consult OT/PT to assist with strengthening/mobility   - Keep Call bell within reach  - Keep bed low and locked with side rails adjusted as appropriate  - Keep care items and personal belongings within reach  - Initiate and maintain comfort rounds  - Make Fall Risk Sign visible to staff  - Offer Toileting every \ Hours, in advance of need  - Initiate/Maintain \alarm  - Obtain necessary fall risk management equipment: \  - Apply yellow socks and bracelet for high fall risk patients  - Consider moving patient to room near nurses station  Outcome: Completed

## 2025-06-25 NOTE — PROGRESS NOTES
Outpatient Oncology Nutrition Consultation   Type of Consult: Follow Up   Care Location: [facility]    Reason for referral: Received notification by [facility] ZAPATA on 8/21/24 that pt has triggered for oncology nutrition care (reason for referral: HNC dx and Malnutrition Screening Tool (MST) Triggers: scored a 0 indicating No recent wt loss and is not eating poorly due to a decreased appetite. 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 Tx changed to Lonsurf + Avastin starting 6/11/25  Oncology History   Malignant neoplasm of right female breast (HCC)   11/23/2018 Initial Diagnosis    Malignant neoplasm of right female breast (HCC)     11/23/2018 Biopsy    Rt Breast US BX 1100 8cmfn, 4 passes 12g Marquee:  - Invasive breast carcinoma of no special type (ductal NST/invasive ductal carcinoma).  - grade 2  - %  - KS 95%  - HER2 negative     12/20/2018 Surgery    Right partial mastectomy and SLN biopsy:  Infiltrating ductal carcinoma, Grade 2/3, felt to be between 2.0 cm and 2.5 cm in greatest dimension  - No invasive tumor is seen at inked margins, but there is a focus of DCIS seen within approximately 1mm of the inked medial margin  - no LVI  - no LCIS  - 0/2 LN's  at least Stage IIA - pT2, pN0, cM0, G2.    - Dr Coronado     12/20/2018 -  Cancer Staged    Stage IIA -  "pT2, pN0, cM0, G2.       1/4/2019 Genomic Testing    Oncotype DX score: 13     2/1/2019 -  Hormone Therapy    anastrozole 1 mg daily as adjuvant endocrine therapy    - Dr Daley       2/14/2019 - 4/3/2019 Radiation    Course: C1    Plan ID Energy Fractions Dose per Fraction (cGy) Dose Correction (cGy) Total Dose Delivered (cGy) Elapsed Days   R Breast 10X/6X 25 / 25 200 0 5,000 40   R BRST BOOST 16E 5 / 5 200 0 1,000 7      Treatment dates:  C1: 2/14/2019 - 4/3/2019       Metastatic colon cancer to liver (HCC)   7/6/2023 Genetic Testing    CARIS Results:   KRAS Pathogenic Variant Exon 2  p.G12D : lack of benefit of cetuximab, panitumumab Level 2   No other actionable mutation; MSI stable; TMB low   MiFOLOXAi Results: \"Decreased Benefit of FOLFOX + Bevacizumab in first line metastatic CRC.  This patient may achieve improved results by receiving an alternative to FOLFOX, such as FOLFIRI, as their initial regimen. As an adjustment to frontline FOLFOXIRI following toxicity: This patient may achieve improved results by removing the oxaliplatin portion of their regimen\".         7/11/2023 Initial Diagnosis    Metastatic colon cancer to liver (HCC)     7/13/2023 -  Cancer Staged    Staging form: Colon and Rectum, AJCC 8th Edition  - Clinical stage from 7/13/2023: cT3, cN0, pM1 - Signed by Harika Medina DO on 9/28/2023  Total positive nodes: 0       7/31/2023 - 8/1/2024 Chemotherapy    cyanocobalamin, 1,000 mcg, Intramuscular, Every 30 days, 7 of 9 cycles  Administration: 1,000 mcg (5/9/2024), 1,000 mcg (5/24/2024), 1,000 mcg (6/20/2024), 1,000 mcg (7/3/2024), 1,000 mcg (7/18/2024), 1,000 mcg (8/1/2024)  potassium chloride, 20 mEq, Intravenous, Once, 2 of 2 cycles  Administration: 20 mEq (11/6/2023), 20 mEq (11/20/2023)  alteplase (CATHFLO), 2 mg, Intracatheter, Every 1 Minute as needed, 21 of 23 cycles  Administration: 2 mg (1/3/2024)  pegfilgrastim (NEULASTA), 6 mg, Subcutaneous, Once, 12 of 12 cycles  Administration: " 6 mg (10/25/2023), 6 mg (11/8/2023), 6 mg (11/22/2023), 6 mg (12/6/2023), 6 mg (12/20/2023), 6 mg (1/12/2024), 6 mg (1/25/2024), 6 mg (4/25/2024), 6 mg (5/9/2024), 6 mg (5/24/2024), 6 mg (6/20/2024)  fluorouracil (ADRUCIL), 895 mg, Intravenous, Once, 21 of 23 cycles  Dose modification: 320 mg/m2 (original dose 400 mg/m2, Cycle 11)  Administration: 900 mg (7/31/2023), 900 mg (8/14/2023), 900 mg (8/28/2023), 900 mg (9/11/2023), 900 mg (9/25/2023), 900 mg (10/9/2023), 900 mg (10/23/2023), 895 mg (11/6/2023), 895 mg (11/20/2023), 895 mg (12/4/2023), 700 mg (12/18/2023), 680 mg (1/10/2024), 680 mg (1/23/2024), 625 mg (4/23/2024), 625 mg (5/7/2024), 625 mg (5/22/2024), 625 mg (6/4/2024), 625 mg (6/18/2024), 625 mg (7/1/2024), 625 mg (7/16/2024), 625 mg (7/30/2024)  nivolumab (OPDIVO) IVPB, 240 mg (100 % of original dose 240 mg), Intravenous, Once, 13 of 15 cycles  Dose modification: 240 mg (original dose 240 mg, Cycle 9)  Administration: 240 mg (11/20/2023), 240 mg (12/4/2023), 240 mg (12/18/2023), 240 mg (1/10/2024), 240 mg (1/23/2024), 240 mg (4/23/2024), 240 mg (5/7/2024), 240 mg (5/22/2024), 240 mg (6/4/2024), 240 mg (6/18/2024), 240 mg (7/1/2024), 240 mg (7/16/2024), 240 mg (7/30/2024)  leucovorin calcium IVPB, 896 mg, Intravenous, Once, 21 of 23 cycles  Administration: 900 mg (7/31/2023), 900 mg (8/14/2023), 900 mg (8/28/2023), 900 mg (9/11/2023), 900 mg (9/25/2023), 900 mg (10/9/2023), 900 mg (10/23/2023), 900 mg (11/6/2023), 900 mg (11/20/2023), 900 mg (12/4/2023), 900 mg (12/18/2023), 850 mg (1/10/2024), 850 mg (1/23/2024), 800 mg (4/23/2024), 800 mg (5/7/2024), 800 mg (5/22/2024), 800 mg (6/4/2024), 800 mg (6/18/2024), 800 mg (7/1/2024), 800 mg (7/16/2024), 800 mg (7/30/2024)  oxaliplatin (ELOXATIN) chemo infusion, 85 mg/m2 = 190.4 mg, Intravenous, Once, 12 of 12 cycles  Dose modification: 68 mg/m2 (original dose 85 mg/m2, Cycle 11, Reason: Other (Must fill in a comment), Comment: increased  fatigue)  Administration: 190.4 mg (7/31/2023), 190.4 mg (8/14/2023), 200 mg (8/28/2023), 200 mg (9/11/2023), 200 mg (9/25/2023), 200 mg (10/9/2023), 200 mg (10/23/2023), 200 mg (11/6/2023), 200 mg (11/20/2023), 190.4 mg (12/4/2023), 150 mg (12/18/2023), 150 mg (1/10/2024)  fluorouracil (ADRUCIL) ambulatory infusion Soln, 1,200 mg/m2/day = 5,375 mg, Intravenous, Over 46 hours, 21 of 23 cycles     9/26/2023 -  Cancer Staged    Staging form: Colon and Rectum, AJCC 8th Edition  - Pathologic: pT3, pN0, cM1 - Signed by Vickie Israel MD on 4/3/2024  Total positive nodes: 0       3/12/2024 Surgery    A. Terminal ileum, appendix, right colon (right hemicolectomy):  - Adenocarcinoma (5.2cm)  - Forty-nine (49) lymph nodes negative for carcinoma (0/49)    - Acellular mucin present in one (1) lymph node   - See staging synoptic (ypT3N0)     1/8/2025 - 5/16/2025 Chemotherapy    fluorouracil (ADRUCIL), 400 mg/m2 = 770 mg, 2 of 2 cycles  Administration: 770 mg (1/8/2025), 770 mg (1/22/2025)  irinotecan (CAMPTOSAR) chemo infusion, 173 mg (50 % of original dose 180 mg/m2), 8 of 11 cycles  Dose modification: 90 mg/m2 (original dose 180 mg/m2, Cycle 1, Reason: Anticipated Tolerance, Comment: 50% dose reduction due to pre-existing diarrhea)  Administration: 180 mg (1/8/2025), 180 mg (1/22/2025), 160 mg (3/5/2025), 160 mg (3/19/2025), 160 mg (4/2/2025), 160 mg (4/16/2025), 160 mg (4/30/2025), 160 mg (5/14/2025)  leucovorin calcium IVPB, 768 mg, 2 of 2 cycles  Administration: 800 mg (1/8/2025), 800 mg (1/22/2025)  fluorouracil (ADRUCIL) ambulatory infusion Soln, 1,200 mg/m2/day = 4,610 mg, 8 of 11 cycles     6/11/2025 -  Chemotherapy    bevacizumab (AVASTIN) IVPB, 5 mg/kg = 362.5 mg (50 % of original dose 10 mg/kg), 1 of 6 cycles  Dose modification: 5 mg/kg (original dose 10 mg/kg, Cycle 1, Reason: Other (Must fill in a comment), Comment: per protocol)  Administration: 362.5 mg (6/11/2025)     Right tonsillar squamous cell carcinoma  (HCC)   7/27/2023 Initial Diagnosis    Right tonsillar squamous cell carcinoma (HCC)     7/27/2023 -  Cancer Staged    Staging form: Pharynx - Oropharynx, AJCC 8th Edition  - Clinical stage from 7/27/2023: Stage III (cT1, cN3, cM0, p16+) - Signed by Evan Plummer MD on 7/27/2023  Stage prefix: Initial diagnosis       9/16/2024 - 11/13/2024 Radiation      Plan ID Energy Fractions Dose per Fraction (cGy) Dose Correction (cGy) Total Dose Delivered (cGy) Elapsed Days   Head_Neck 6X-FFF 35 / 35 200 0 7,000 58    C2: 9/16/2024 - 11/13/2024 9/17/2024 - 10/21/2024 Chemotherapy    alteplase (CATHFLO), 2 mg, Intracatheter, Every 1 Minute as needed, 6 of 6 cycles  Administration: 2 mg (10/21/2024)  CISplatin (PLATINOL) infusion, 70 mg (original dose 40 mg/m2), Intravenous, Once, 6 of 6 cycles  Dose modification: 70 mg (original dose 40 mg/m2, Cycle 1, Reason: Anticipated Tolerance), 30 mg/m2 (original dose 40 mg/m2, Cycle 4, Reason: Neuropathy, Comment: dose reduction to 30 mg/m2 due to neuropathy)  Administration: 70 mg (9/17/2024), 70 mg (9/23/2024), 70 mg (9/30/2024), 59.1 mg (10/7/2024), 59.1 mg (10/14/2024), 59.1 mg (10/21/2024)  sodium chloride, 500 mL, Intravenous, Once, 6 of 6 cycles  Administration: 500 mL (9/17/2024), 500 mL (9/17/2024), 500 mL (9/23/2024), 500 mL (9/23/2024), 500 mL (9/30/2024), 500 mL (9/30/2024), 500 mL (10/7/2024), 500 mL (10/7/2024), 500 mL (10/14/2024), 500 mL (10/14/2024), 500 mL (10/21/2024)  fosaprepitant (EMEND) IVPB, 150 mg, Intravenous, Once, 6 of 6 cycles  Administration: 150 mg (9/17/2024), 150 mg (9/23/2024), 150 mg (9/30/2024), 150 mg (10/7/2024), 150 mg (10/14/2024), 150 mg (10/21/2024)  IVPB builder, , Intravenous, Once, 1 of 1 cycle  Administration: Unknown dose (10/21/2024)       Past Medical & Surgical Hx:   Patient Active Problem List   Diagnosis    Primary hypertension    Mixed hyperlipidemia    Malignant neoplasm of right female breast (HCC)    Vitamin D  deficiency    Prediabetes    Right thyroid nodule    GERD (gastroesophageal reflux disease)    Obesity    Metastatic colon cancer to liver (HCC)    Right tonsillar squamous cell carcinoma (HCC)    Poor appetite    Nutritional anemia    Dehydration    Drug-induced constipation    Chemotherapy induced neutropenia (HCC)    Stage 3a chronic kidney disease (HCC)    Thrombocytopenia (HCC)    Neuropathy    Diastolic dysfunction    Hypokalemia    Leukemoid reaction    GOGO (acute kidney injury) (HCC)    Acute post-operative pain    At risk for constipation    At risk for delirium    Palliative care encounter    Physical deconditioning    History of CVA (cerebrovascular accident)    Chronic nasal congestion    Elevated LFTs    Vitamin B12 deficiency    Neoplastic malignant related fatigue    Sensation, choking    S/P percutaneous endoscopic gastrostomy (PEG) tube placement (HCC)    Pancytopenia due to chemotherapy (HCC)    Cancer related pain    Hypomagnesemia    Functional diarrhea    Antineoplastic chemotherapy induced anemia    Hypertensive heart and renal disease with congestive heart failure (HCC)     Past Medical History:   Diagnosis Date    Anemia     Arthritis     Body mass index (BMI) 40.0-44.9, adult (HCC) 9/22/2023    Breast cancer (HCC) 12/17/2018    Cancer (HCC)     right breast, colon, liver, right tonsil    Cervical lymphadenopathy 3/21/2023    CT neck on 3/30/2023- Large right level 2A lymphadenopathy as described above and suspicious for neoplasm.  Correlation with histopathology is recommended.  Mild asymmetric prominence of the right palatine tonsil with otherwise no definitive nodular enhancing lesions identified along the course of the aerodigestive tract.     5/26/23- FNA of this node was nonrevealing for tissue etiology, but it d    Encounter for screening for HIV 07/07/2022    Follow-up examination 04/04/2023    GERD (gastroesophageal reflux disease)     Hyperlipidemia     Hypertension     Obesity      Stroke (HCC)     TIA     Stroke (HCC)     TIA     Transaminitis 2021    Vasomotor rhinitis 2018    Refilled flonase today. Stopped taking since 2022     Past Surgical History:   Procedure Laterality Date    BREAST BIOPSY Right 2018    us guided bx cancer    BREAST SURGERY      COLONOSCOPY      ESOPHAGOSCOPY N/A 2023    Procedure: ESOPHAGOSCOPY;  Surgeon: David Chapa MD;  [facility];  Service: ENT    HEMICOLOECTOMY W/ ANASTOMOSIS Right 3/12/2024    Procedure: RIGHT COLECTOMY WITH ROBOTICS;  Surgeon: Vickie Israel MD;  [facility];  Service: Surgical Oncology    IR BIOPSY LIVER MASS  2023    IR CRYOABLATION  6/3/2024    IR GASTROSTOMY (G) TUBE CHECK/CHANGE/REINSERTION/UPSIZE  2025    IR GASTROSTOMY TUBE PLACEMENT  10/2/2024    IR PORT CHECK  2024    IR PORT PLACEMENT  2023    IR PORT STRIPPING  2024    LAPAROTOMY N/A 3/12/2024    Procedure: LAPAROTOMY EXPLORATORY W/ ROBOTICS;  Surgeon: Vickie Israel MD;  [facility];  Service: Surgical Oncology    OH BIOPSY NASOPHARYNX VISIBLE LESION SIMPLE N/A 10/9/2024    Procedure: NASOPHARYNGOSCOPY with biospy;  Surgeon: Juanito Matthew MD;  [facility]: [facility];  Service: ENT    OH [facility] INCL FLUOR GDNCE DX W/CELL WASHG SPX N/A 2023    Procedure: BRONCHOSCOPY;  Surgeon: David Chapa MD;  [facility];  Service: ENT    OH LARYNGOSCOPY W/BIOPSY MICROSCOPE/TELESCOPE N/A 2023    Procedure: MICRODIRECT LARYNGOSCOPY WITH BIOPSY;  Surgeon: David Chapa MD;  [facility];  Service: ENT    OH LARYNGOSCOPY W/WO TRACHEOSCOPY DX EXCEPT  N/A 10/9/2024    Procedure: DIRECT LARYNGOSCOPY;  Surgeon: Juanito Matthew MD;  [facility];  Service: ENT    OH MASTECTOMY PARTIAL W/AXILLARY LYMPHADENECTOMY Right 2018    Procedure: ULTRASOUND LOCALIZED PARTIAL MASTECTOMY W/SENTINEL NODE BIOPSY POSS AXILLARY DISSECTION;  Surgeon: Zahida  MD Ivonne;  Location:  MAIN OR;  Service: General    OR TONSILLECTOMY PRIMARY/SECONDARY AGE 12/> N/A 10/9/2024    Procedure: TONSILLECTOMY;  Surgeon: Juanito Matthew MD;  [facility];  Service: ENT    TUBAL LIGATION      US GUIDED BREAST BIOPSY RIGHT COMPLETE Right 11/23/2018    US GUIDED INJECTION FOR RESEARCH STUDY  12/20/2018    US GUIDED INJECTION FOR RESEARCH STUDY  12/07/2018    US GUIDED INJECTION FOR RESEARCH STUDY  05/03/2019    US GUIDED LYMPH NODE BIOPSY RIGHT  05/26/2023       Review of Medications:   Vitamins, Supplements and Herbals: No, pt denies taking supplements    Current Outpatient Medications:     acetaminophen (TYLENOL) 160 mg/5 mL liquid, Take 20 mL (640 mg total) by mouth every 6 (six) hours as needed for mild pain for up to 10 days, Disp: 473 mL, Rfl: 3    anastrozole (ARIMIDEX) 1 mg tablet, Take 1 tablet (1 mg total) by mouth daily, Disp: 90 tablet, Rfl: 0    atorvastatin (LIPITOR) 40 mg tablet, Take 1 tablet (40 mg total) by mouth daily at bedtime, Disp: 90 tablet, Rfl: 3    cholestyramine (QUESTRAN) 4 g packet, Take 1 packet (4 g total) by mouth 3 (three) times a day with meals, Disp: 90 packet, Rfl: 3    diphenhydramine, lidocaine, Al/Mg hydroxide, simethicone (Magic Mouthwash) SUSP, Swish and swallow 10 mL every 4 (four) hours as needed for mouth pain or discomfort (Patient not taking: Reported on 1/31/2025), Disp: 480 mL, Rfl: 2    docusate sodium (COLACE) 50 mg capsule, Take 1 capsule (50 mg total) by mouth 2 (two) times a day, Disp: 90 capsule, Rfl: 1    dronabinol (MARINOL) 2.5 mg capsule, Take 1 capsule (2.5 mg total) by mouth in the morning, Disp: 5 capsule, Rfl: 0    ergocalciferol (VITAMIN D2) 50,000 units, Take 1 capsule (50,000 Units total) by mouth once a week, Disp: 90 capsule, Rfl: 0    folic acid (FOLVITE) 1 mg tablet, Take 1 tablet (1 mg total) by mouth in the morning, Disp: 90 tablet, Rfl: 3    gabapentin (NEURONTIN) 300 mg capsule, Take 1 pills in the  morning, 1 pill at lunch and 2 pills before bed, Disp: 120 capsule, Rfl: 0    hydrocortisone 1 % ointment, , Disp: , Rfl:     ibuprofen (MOTRIN) 100 mg/5 mL suspension, Take 20 mL (400 mg total) by mouth every 6 (six) hours as needed for moderate pain (Patient not taking: Reported on 1/31/2025), Disp: 473 mL, Rfl: 0    lidocaine-prilocaine (EMLA) cream, Apply topically as needed for mild pain (Patient not taking: Reported on 12/6/2024), Disp: 30 g, Rfl: 3    losartan (COZAAR) 50 mg tablet, Take 1 tablet (50 mg total) by mouth daily, Disp: 90 tablet, Rfl: 2    magnesium Oxide (MAG-OX) 400 mg TABS, 1 tablet (400 mg total) by Per G Tube route 2 (two) times a day (Patient not taking: Reported on 1/31/2025), Disp: 60 tablet, Rfl: 0    mirtazapine (REMERON) 15 mg tablet, Take 1 tablet (15 mg total) by mouth daily at bedtime Patient is also on a 30 mg tablet, for a total dose of 45 mg, Disp: 30 tablet, Rfl: 0    mirtazapine (REMERON) 30 mg tablet, Take 1 tablet (30 mg total) by mouth daily at bedtime, Disp: 30 tablet, Rfl: 0    nystatin (MYCOSTATIN) 500,000 units/5 mL suspension, Apply 5 mL (500,000 Units total) to the mouth or throat 4 (four) times a day, Disp: 600 mL, Rfl: 3    omeprazole (PriLOSEC) 20 mg delayed release capsule, Take 1 capsule (20 mg total) by mouth daily, Disp: 30 capsule, Rfl: 3    ondansetron (ZOFRAN) 8 mg tablet, Take 1 tablet (8 mg total) by mouth every 8 (eight) hours as needed for nausea or vomiting, Disp: 90 tablet, Rfl: 2    oxyCODONE (ROXICODONE) 5 mg/5 mL solution, Take 5 mL (5 mg total) by mouth every 6 (six) hours as needed for severe pain ((breakthrough pain)) Max Daily Amount: 20 mg, Disp: 473 mL, Rfl: 0    potassium chloride (Klor-Con M20) 20 mEq tablet, Take 1 tablet (20 mEq total) by mouth 2 (two) times a day (Patient not taking: Reported on 1/31/2025), Disp: 90 tablet, Rfl: 1    potassium chloride 10% oral solution, 15 mL (20 mEq total) by Per G Tube route 2 (two) times a day, Disp:  "473 mL, Rfl: 0    prochlorperazine (COMPAZINE) 10 mg tablet, Take 1 tablet (10 mg total) by mouth every 6 (six) hours as needed for nausea or vomiting, Disp: 90 tablet, Rfl: 2    pyridoxine (VITAMIN B6) 50 mg tablet, Take 1 tablet (50 mg total) by mouth daily, Disp: 90 tablet, Rfl: 3    Trifluridine-Tipiracil 20-8.19 MG TABS, Take 3 tablets by mouth 2 (two) times a day Days 1-5 and 8-12 every 28 days, Disp: 60 tablet, Rfl: 5    vitamin B-12 (VITAMIN B-12) 1,000 mcg tablet, , Disp: , Rfl:   No current facility-administered medications for this visit.    Facility-Administered Medications Ordered in Other Visits:     alteplase (CATHFLO) injection 2 mg, 2 mg, Intracatheter, Q1MIN PRN, Harika Agoino, DO, 2 mg at 06/25/25 1223    Most Recent Lab Results:   Lab Results   Component Value Date    WBC 8.17 06/18/2025    NEUTROABS 6.05 06/18/2025    IRON 21 (L) 02/28/2025    TIBC 170.8 (L) 02/28/2025    FERRITIN 712 (H) 02/28/2025    CHOLESTEROL 167 04/24/2024    TRIG 137 04/24/2024    HDL 43 (L) 04/24/2024    LDLCALC 97 04/24/2024    ALT 17 06/18/2025    AST 41 (H) 06/18/2025    ALB 3.1 (L) 06/18/2025    SODIUM 133 (L) 06/18/2025    SODIUM 132 (L) 06/16/2025    K 4.2 06/18/2025    K 4.2 06/16/2025     06/18/2025    BUN 26 (H) 06/18/2025    BUN 24 06/16/2025    CREATININE 1.02 06/18/2025    CREATININE 1.07 06/16/2025    EGFR 56 06/18/2025    PHOS 2.8 11/01/2024    PHOS 3.3 10/30/2024    TSH 1.58 01/03/2022    GLUCOSE 209 (H) 03/12/2024    POCGLU 89 12/27/2024    GLUF 102 (H) 10/30/2024    GLUF 95 09/13/2024    GLUC 99 06/18/2025    HGBA1C 5.5 02/21/2024    HGBA1C 5.9 (H) 06/13/2023    HGBA1C 6.1 (H) 07/09/2022    CALCIUM 9.1 06/18/2025    FOLATE 14.1 02/28/2025    MG 2.4 06/18/2025       Anthropometric Measurements:   Height: 66\"  Ht Readings from Last 1 Encounters:   06/25/25 5' 5\" (1.651 m)     Wt Readings from Last 20 Encounters:   06/25/25 70.3 kg (154 lb 15.7 oz)   06/24/25 70.8 kg (156 lb)   06/18/25 70.7 kg (155 " lb 12.8 oz)   06/11/25 70.6 kg (155 lb 10.3 oz)   05/27/25 72.5 kg (159 lb 12.8 oz)   05/14/25 72.6 kg (160 lb 0.9 oz)   05/07/25 73 kg (161 lb)   04/30/25 73.5 kg (162 lb)   04/30/25 73 kg (161 lb)   04/16/25 73.5 kg (162 lb)   04/02/25 72.6 kg (160 lb 0.9 oz)   04/01/25 72.6 kg (160 lb)   03/19/25 70.8 kg (156 lb 1.4 oz)   03/18/25 71.2 kg (157 lb)   03/05/25 73.1 kg (161 lb 2.5 oz)   02/28/25 72.6 kg (160 lb)   02/18/25 73 kg (161 lb)   02/04/25 73.4 kg (161 lb 12.8 oz)   01/31/25 75.1 kg (165 lb 9.6 oz)   01/30/25 75.6 kg (166 lb 10.7 oz)       Weight History:   Usual Weight: 275#  [employer]: (2/22/19) 264.6#, (4/2/19) 263.4, (9/16/24) 197.4#, (9/23/24) 194#, (9/30/24) 192.8#, (10/7/24) 190.2#, (10/11/24) 194.6#, (10/14/24) 191#, (10/17/24) 192.8#, (10/21/24) 190#, (10/24/24) 190#, (10/28/24) 189#, (11/11/24) 185#  Home Scale: n/a    Oncology Nutrition-Anthropometrics      Flowsheet Row Nutrition from 6/25/2025 in [facility] Nutrition from 6/11/2025 in [facility]   Patient [age] (years): 70 years 70 years   Patient (female) height (in): 61 in 61 in   Current Weight to be used for anthropometric calculations (lbs) 155 lbs 155.7 lbs   Current Weight to be used for anthropometric calculations (kg) 70.5 kg 70.8 kg   BMI: 29.3 29.4   IBW female: 105 lbs 105 lbs   IBW (kg) female: 47.7 kg 47.7 kg   IBW % (female) 147.6 % 148.3 %   Adjusted BW (female): 117.5 lbs 117.7 lbs   Adjusted BW kg (female): 53.4 kg 53.5 kg   % weight change after 1 week: -0.5 % --   Weight change after 1 week (lbs) -0.8 lbs --   % weight change after 1 month: -3 % -2.7 %   Weight change after 1 month (lbs) -4.8 lbs -4.4 lbs   % weight change after 3 months: -0.7 % -3.4 %   Weight change after 3 months (lbs) -1.1 lbs -5.5 lbs            Nutrition-Focused Physical Findings: none observed    Food/Nutrition-Related History & Client/Social History:    Current Nutrition  Impact Symptoms:  [] Nausea  [x] Reduced Appetite  [] Acid Reflux    [] Vomiting  [x] Unintended Wt Loss -stable over the last month with some fluctuations [] Malabsorption    [] Diarrhea -resolved [] Unintended Wt Gain  [] Dumping Syndrome    [] Constipation -BM every other day [] Thick Mucous/Secretions  [] Abdominal Pain    [x] Dysgeusia (Altered Taste) -starting to improve [x] Xerostomia (Dry Mouth)  [] Gas    [] Dysosmia (Altered Smell)  [] Thrush  [] Difficulty Chewing    [] Oral Mucositis (Sore Mouth)  [x] Fatigue  [] Hyperglycemia   Lab Results   Component Value Date    HGBA1C 5.5 2024      [x] Odynophagia -improving [] Esophagitis  [] Other:    [x] Dysphagia   Follows with SLP  Video swallow -recommending pureed diet with thin liquids [] Early Satiety  [] No Problems Eating      Food Allergies & Intolerances: no    Current Diet: Soft/Moist and Tube Feeding  Current Nutrition Intake: Unchanged from last visit  Appetite: Fair   Nutrition Route: PO and PEG  Oral Care: brushes daily  Activity level: ADLs.     24 Hr Diet Recall:   Breakfast: oatmeal  Dinner: mashed potatoes     Beverages: water (sips throughout the day)  Supplements:   Ensure Complete (10 oz, 350 kcal, 30 g pro) -2 daily sometimes will drink them, sometimes puts them through PEG. Has been drinking them more lately    EN Recall:  DME: [employer]  Access Type: PEG  Formula: [employer] Peptide 1.5  Method: Bolus  Regimen: 11oz (325 mL) 2  times per day of [employer] 1.5 via PEG (gravity syringe method to administer boluses; 1 container infusing over ~15-20 minutes).  Flush: 60 mL free water flush  pre/post each bolus (120 mL x 2 boluses = 240 mL)  EN providin mL volume (2 cartons/day), 1000 kcal (46% est needs), 48g protein (55% est needs), 456 mL free water  Pt also flushing 1-2 syringes every few hours for added hydration. Pt gets IVF 3x/week.       Oncology Nutrition-Estimated Needs      Flowsheet Row Nutrition from 2025 in  [facility] Nutrition from 2025 in [facility]   Weight type used Actual weight Actual weight   Weight in kilograms (kg) used for estimated needs 73.2 kg 77.3 kg   Energy needs formula:  30-35 kcal/kg 30-35 kcal/kg   Energy needs based on 30 kcal/k kcal 2318 kcal   Energy needs based on 35 kcal/k kcal 2705 kcal   Protein needs formula: 1.2-1.5 g/kg 1.2-1.5 g/kg   Protein needs based on 1.2 g/k g 93 g   Protein needs based on 1.5 g/kg 110 g 116 g   Fluid needs formula: 30-35 mL/kg 30-35 mL/kg   Fluid needs based on 30 mL/kg 2196 mL 2319 mL   Fluid needs in ounces 74 oz 78 oz   Fluid needs based on 35 mL/kg 2562 mL 2706 mL   Fluid needs in ounces 87 oz 91 oz             Discussion & Intervention:   Maira was evaluated today for an RD follow up regarding HNC and enteral nutrition.  Maira has completed tx for HNC and is currently undergoing tx for metastatic colon cancer.  met with Maira today in the infusion center. She continues to use her PEG, 2 cartons of LinkStorm daily.  She is eating small amounts of pureed foods like oatmeal and mashed potatoes. She is drinking 2 Ensure complete shakes daily. Weight has been stable. I encouraged her to reach out to GI as they attempted to call her to schedule an appt for TF management. Will provide her with their contact information. She states she has enough formula at this time.  Reviewed 24 hour recall, which revealed an adequate enteral intake and PO intake, and discussed ways to increase kcal, protein, and fluid intakes and optimize nutrient intake.  Also reviewed the importance of wt management throughout the tx process and the role of enteral nutrition in managing wt and overall health.  Based on today's assessment, discussion included: MNT for:   fluid, pureed diet,  enteral nutrition, practicing proper oral care, adequate hydration & fluid choices, utilizing  oral nutrition supplements, practicing proper feeding tube use & care, adherence to and compliance with enteral nutrition plan of care, and individualized enteral nutrition recommendations & plan:  .   Moving forward, Maira was encouraged to increase kcal, protein, and fluid intakes via PEG and oral intake as able  Materials Provided: Ensure samples  All questions and concerns addressed during today’s visit.  Maira has RD contact information.    Nutrition Diagnosis:   Inadequate Energy Intake related to physiological causes, disease state and treatment related issues as evidenced by food recall, wt loss and discussion with pt and/or family.  Increased Nutrient Needs (kcal & pro) related to increased demand for nutrients and disease state as evidenced by cancer dx and pt undergoing tx for cancer.  Swallowing Difficulty related to diagnosis/treatment related causes as evidenced by  need for feeding tube, diet restriction per SLP.  Monitoring & Evaluation:   Goals:  weight maintenance/stabilization  adequate nutrition impact symptom management  pt to meet >/=75% estimated nutrition needs daily  Utilize PEG feeds    Progress Towards Goals: Progressing    Nutrition Rx & Recommendations:  Diet: enteral nutrition as 1' source of nutrition, pureed diet-high calorie high protein  Stay hydrated by sipping fluids of choice/tolerance throughout the day.    Follow proper oral care; Try baking soda/salt water rinse recipe (mix 3/4 tsp salt + 1 tsp baking soda + 1 qt water; rinse with plain water after using) in Eating Hints book (pg 18).  Brush your teeth before/after meals & before bed.  Weigh yourself regularly. If you notice weight loss, make an effort to increase your daily food/calorie intake. If you continue to notice loss after these efforts, reach out to your dietitian to establish a plan to stabilize weight.   Continue with Ensure Plus/Complete 2 times daily. try drinking orally, but OK to put through PEG if  tolerated better  Choose liquids with calories: whole milk, Fairlife milk (higher protein/lactose-free milk), chocolate milk, drinkable yogurt, kefir, 100% fruit juice, diluted juice, bone broth (higher protein broth), creamy soups, sports drinks (Gatorade, Poweraide, Pedialyte, etc.), Italian ice, popsicles, milkshakes, smoothies, oral nutrition supplements (Ensure, Boost, etc.), gelatin/Jello, etc.  Pureed Foods:  To puree foods: placed chopped food into  or . Add enough liquid to cover the blade. Blend until food is smooth and free of chunks.  You may add non-fat milk, clear broths or non-fat gravies when blending foods.  You can use baby food as long as you have a pureed protein source as the main part of your meal.  Avoid items with added sugars (read food labels).  Food ideas:  Applesauce or canned fruit blended  Scrambled whole egg, egg white or egg substitutes  Greek yogurt, yogurt (plain or flavored)   pudding  Light cottage cheese  Ricotta cheese, part-skim  Vegetables (cook until soft & tender, without peels or skins) blended, or can use baby food (vegetables)  Mashed potatoes (white or sweet potato)  Oatmeal, cream of wheat cereal (made with non-fat milk)  Soups- can be blended in  or , or with immersion       TF plan -   Continue at least 2 cartons of [employer] 1.5 daily  If PO decreases and weight drops more, consider increasing to goal TF which is:  Shayy Harimata 1.5 4 cartons daily (1300 mL total volume, 2000 calories, 96g protein, and 910 mL free water)  Contact RD for substitutions  Call [employer] when you have 10 days left of TF supplies: [phone], option 3 (customer support).      Follow Up Plan: during infusion on 6/25/25   Recommend Referral to Other Providers: none at this time

## 2025-06-25 NOTE — PROGRESS NOTES
Pt offers no new complaints today, no blood return through port, TPA given and brisk blood return after 90 minutes, PIV was placed after an hour of TPA to get treatment started with hydration and zofran which was tolerated well, then avastin through port without complications, confirmed appt back on Monday 6-30-25 1:00 , AVS provided.

## 2025-06-26 ENCOUNTER — APPOINTMENT (OUTPATIENT)
Dept: SPEECH THERAPY | Facility: CLINIC | Age: 70
End: 2025-06-26
Attending: INTERNAL MEDICINE
Payer: MEDICARE

## 2025-06-26 DIAGNOSIS — C18.9 METASTATIC COLON CANCER TO LIVER (HCC): ICD-10-CM

## 2025-06-26 DIAGNOSIS — C78.7 METASTATIC COLON CANCER TO LIVER (HCC): ICD-10-CM

## 2025-06-26 RX ORDER — GABAPENTIN 300 MG/1
CAPSULE ORAL
Qty: 120 CAPSULE | Refills: 5 | Status: SHIPPED | OUTPATIENT
Start: 2025-06-26

## 2025-06-26 NOTE — TELEPHONE ENCOUNTER
Reason for call:   [x] Refill   [] Prior Auth  [] Other:     Office:   [] PCP/Provider -   [x] Specialty/Provider -  [facility]  Authorized By: Harika Medina DO    Medication: gabapentin (NEURONTIN) 300 mg capsule       Pharmacy:  [facility] - [place], [place] - [address] [place] [address] Pharmacy   Does the patient have enough for 3 days?   [] Yes   [x] No - Send as HP to POD    Mail Away Pharmacy   Does the patient have enough for 10 days?   [] Yes   [] No - Send as HP to POD

## 2025-06-30 ENCOUNTER — HOSPITAL ENCOUNTER (OUTPATIENT)
Dept: INFUSION CENTER | Facility: CLINIC | Age: 70
Discharge: HOME | End: 2025-06-30
Attending: INTERNAL MEDICINE
Payer: MEDICARE

## 2025-06-30 VITALS
TEMPERATURE: 97.7 F | DIASTOLIC BLOOD PRESSURE: 83 MMHG | RESPIRATION RATE: 18 BRPM | HEART RATE: 96 BPM | SYSTOLIC BLOOD PRESSURE: 119 MMHG

## 2025-06-30 DIAGNOSIS — E86.0 DEHYDRATION: Primary | ICD-10-CM

## 2025-06-30 LAB
ALBUMIN SERPL BCG-MCNC: 3 G/DL (ref 3.5–5)
ALP SERPL-CCNC: 96 U/L (ref 34–104)
ALT SERPL W P-5'-P-CCNC: 7 U/L (ref 7–52)
ANION GAP SERPL CALCULATED.3IONS-SCNC: 5 MMOL/L (ref 4–13)
AST SERPL W P-5'-P-CCNC: 20 U/L (ref 13–39)
BASOPHILS # BLD AUTO: 0.04 THOUSANDS/ÂΜL (ref 0–0.1)
BASOPHILS NFR BLD AUTO: 1 % (ref 0–1)
BILIRUB SERPL-MCNC: 0.24 MG/DL (ref 0.2–1)
BUN SERPL-MCNC: 24 MG/DL (ref 5–25)
CALCIUM ALBUM COR SERPL-MCNC: 9.3 MG/DL (ref 8.3–10.1)
CALCIUM SERPL-MCNC: 8.5 MG/DL (ref 8.4–10.2)
CHLORIDE SERPL-SCNC: 102 MMOL/L (ref 96–108)
CO2 SERPL-SCNC: 28 MMOL/L (ref 21–32)
CREAT SERPL-MCNC: 0.95 MG/DL (ref 0.6–1.3)
EOSINOPHIL # BLD AUTO: 0.05 THOUSAND/ÂΜL (ref 0–0.61)
EOSINOPHIL NFR BLD AUTO: 1 % (ref 0–6)
ERYTHROCYTE [DISTWIDTH] IN BLOOD BY AUTOMATED COUNT: 13.7 % (ref 11.6–15.1)
GFR SERPL CREATININE-BSD FRML MDRD: 61 ML/MIN/1.73SQ M
GLUCOSE SERPL-MCNC: 107 MG/DL (ref 65–140)
HCT VFR BLD AUTO: 28.4 % (ref 34.8–46.1)
HGB BLD-MCNC: 9.3 G/DL (ref 11.5–15.4)
IMM GRANULOCYTES # BLD AUTO: 0.02 THOUSAND/UL (ref 0–0.2)
IMM GRANULOCYTES NFR BLD AUTO: 0 % (ref 0–2)
LYMPHOCYTES # BLD AUTO: 0.54 THOUSANDS/ÂΜL (ref 0.6–4.47)
LYMPHOCYTES NFR BLD AUTO: 12 % (ref 14–44)
MAGNESIUM SERPL-MCNC: 1.9 MG/DL (ref 1.9–2.7)
MCH RBC QN AUTO: 30.7 PG (ref 26.8–34.3)
MCHC RBC AUTO-ENTMCNC: 32.7 G/DL (ref 31.4–37.4)
MCV RBC AUTO: 94 FL (ref 82–98)
MONOCYTES # BLD AUTO: 0.24 THOUSAND/ÂΜL (ref 0.17–1.22)
MONOCYTES NFR BLD AUTO: 5 % (ref 4–12)
NEUTROPHILS # BLD AUTO: 3.56 THOUSANDS/ÂΜL (ref 1.85–7.62)
NEUTS SEG NFR BLD AUTO: 81 % (ref 43–75)
NRBC BLD AUTO-RTO: 0 /100 WBCS
PLATELET # BLD AUTO: 411 THOUSANDS/UL (ref 149–390)
PMV BLD AUTO: 9.8 FL (ref 8.9–12.7)
POTASSIUM SERPL-SCNC: 4.2 MMOL/L (ref 3.5–5.3)
PROT SERPL-MCNC: 6.9 G/DL (ref 6.4–8.4)
RBC # BLD AUTO: 3.03 MILLION/UL (ref 3.81–5.12)
SODIUM SERPL-SCNC: 135 MMOL/L (ref 135–147)
WBC # BLD AUTO: 4.45 THOUSAND/UL (ref 4.31–10.16)

## 2025-06-30 PROCEDURE — 85025 COMPLETE CBC W/AUTO DIFF WBC: CPT | Performed by: INTERNAL MEDICINE

## 2025-06-30 PROCEDURE — 80053 COMPREHEN METABOLIC PANEL: CPT | Performed by: INTERNAL MEDICINE

## 2025-06-30 PROCEDURE — 96361 HYDRATE IV INFUSION ADD-ON: CPT

## 2025-06-30 PROCEDURE — 83735 ASSAY OF MAGNESIUM: CPT | Performed by: INTERNAL MEDICINE

## 2025-06-30 PROCEDURE — 36593 DECLOT VASCULAR DEVICE: CPT

## 2025-06-30 PROCEDURE — 96374 THER/PROPH/DIAG INJ IV PUSH: CPT

## 2025-06-30 RX ADMIN — SODIUM CHLORIDE 1000 ML: 0.9 INJECTION, SOLUTION INTRAVENOUS at 13:40

## 2025-06-30 RX ADMIN — ONDANSETRON 8 MG: 2 INJECTION INTRAMUSCULAR; INTRAVENOUS at 13:40

## 2025-06-30 RX ADMIN — ALTEPLASE 2 MG: 2.2 INJECTION, POWDER, LYOPHILIZED, FOR SOLUTION INTRAVENOUS at 13:27

## 2025-06-30 NOTE — PROGRESS NOTES
Patient tolerated hydration without complication. Port with good blood return post cath-arlene administration. Labs drawn and sent. AVS declined, aware to return on 7/2 @ 12:00, patient left unit in stable condition.

## 2025-07-01 ENCOUNTER — EVALUATION (OUTPATIENT)
Dept: SPEECH THERAPY | Facility: CLINIC | Age: 70
End: 2025-07-01
Attending: INTERNAL MEDICINE
Payer: MEDICARE

## 2025-07-01 DIAGNOSIS — Z90.89 STATUS POST TONSILLECTOMY: ICD-10-CM

## 2025-07-01 DIAGNOSIS — C09.9 RIGHT TONSILLAR SQUAMOUS CELL CARCINOMA (HCC): ICD-10-CM

## 2025-07-01 DIAGNOSIS — R13.12 DYSPHAGIA, OROPHARYNGEAL PHASE: Primary | ICD-10-CM

## 2025-07-01 PROCEDURE — 92526 ORAL FUNCTION THERAPY: CPT

## 2025-07-01 NOTE — PROGRESS NOTES
Speech-Language Pathology Re-Evaluation    Today's date: 2025   Patient’s name: Maira Moura  : 1955  MRN: [id number]  Safety measures: HTN, CVA, GERD, active CA, s/p PEG tube placement, aspiration precautions  Current recommended diet: PEG tube for primary nutrition/hydration; mech soft/moist with thin liquids P.O. as tolerated   Referring provider: Harika Medina DO    Encounter Diagnosis     ICD-10-CM    1. Dysphagia, oropharyngeal phase  R13.12       2. Status post tonsillectomy  Z90.89       3. 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 Patient had a repeat VFSS completed on 2025 (results included below). Patient reported that she is consuming 2 meals/day along with her tube feedings (2 cartons). Patient is tolerating more P.O. textures and continues to be motivated to no longer require a PEG tube. Patient continues to follow with Nutrition services. Patient continues with c/o globus sensation with more dense and dry food consistencies, most likely related to decreased tongue base retraction and reduced hyolaryngeal elevation/excursion. Patient is also limited by xerostomia. Patient noted that her dysgeusia is improving, as well as her appetite. Patient has better controlled pain, too. Patient would continue to benefit from outpatient skilled Speech Therapy services for further education/training on safe swallowing strategies & aspiration precautions, for continued assessment of patient's tolerance of the safest, least restrictive diet, for therapeutic exercises, to facilitate overall improved quality of life, and for instruction on HEP.     -Safety concerns: Risk for aspiration, Risk for choking, and Risk for inadequate nutrition/ hydration    -Risk factors: History of Head and Neck Cancer, Complex medical history, and GERD      SWALLOWING SAFETY  PRECAUTIONS:  PEG tube for primary nutrition/hydration    -Recommended solids: Mechanical Soft (*moisten if dry) -- avoid/limit mixed textures    -Recommended liquids: Thin    -Recommended medication form: via PEG or crushed with puree (as tolerated) -- *consult with physician to discuss if medication form can be altered    -Frequent/thorough oral care    -Aspiration precautions  *Monitor for signs/symptoms concerning for aspiration (e.g., low grade fever, increase in WBC, change in chest x-ray, increased congestion, increased coughing with P.O. intake)    -Reflux precautions    -Strategies: Small sips and bites when eating, Slow rate, swallow between bites, Alternate liquids and solids, and Other (oral prep set)    -Positioning: Upright position during meals and Upright position at least 30 minutes after P.O. intake    -Compensatory strategies: Effortful swallow, Multiple swallows, and Other:(periodic volitional cough)    -Supervision: Independent     -Referrals: none at this time      Short-term goals: (: 2025)  Patient will receive a videofluoroscopic swallow study (VFSS) to assess her current swallowing function to r/o penetration or aspiration, to determine the safest, least restrictive diet, and to recommended the appropriate rehabilitation exercises. -- MET    Patient will tolerate P.O. trials of her recommended diet with the implementation of safe swallowing strategies without overt s/sx of penetration and/or aspiration during skilled therapy sessions in this certification period. -- PARTIALLY MET/ONGOING    Patient will perform oral motor exercises using IOPI device on lingual muscles to facilitate increased function and strength by 25% for improved swallowing function. -- MET    Patient will independently complete pharyngeal strengthening exercises (e.g., Effortful swallow, Mendelsohn, Evelia) daily to facilitate increased pharyngeal strength and coordination. -- PARTIALLY MET/ONGOING  "    Long-term goals:  Patient will maintain adequate nutrition and hydration with optimum safety and efficacy of swallowing function on P.O. intake without overt s/sx of penetration or aspiration (to be achieved by discharge). -- CONTINUE    Patient will independently utilize compensatory strategies with optimum safety and efficacy of swallowing function on P.O. intake without overt s/sx of penetration or aspiration (to be achieved by discharge). -- CONTINUE      Plan:   Patient would benefit from outpatient skilled Speech Therapy services: Dysphagia therapy (Due to patient's history of dysphagia, she may benefit from neuromuscular electrical stimulation (NMES) (i.e., VitalStim) treatment in conjunction with pharyngeal/laryngeal strengthening exercises and P.O. intake, unless otherwise contraindicated.)    Frequency: 2x weekly  Duration: 2 months    Intervention certification from: 7/1/2025  Intervention certification to: 08/23/2025      Subjective: \"I'm getting tired of yogurt\"    Patient reports she is on a new chemo drug; which is giving her diarrhea; which has made her feel worse lately    Patient's goal(s): \"to learn how to swallow again\"     Pain: \"been good\" 0/10 today      Objective:     Dysphagia UPDATE    Functional Outcome Measurements    Functional Oral Intake Scale (FOIS): 3 - tube supplements with consistent oral intake (improved from 2)    -Eating Assessment Tool (EAT-10) is a self-administered, symptom-specific outcome instrument for dysphagia. It consists of ten statements that a patient rates on a scale of 0-4, with 0=no problem to 4=severe problem. A score of 3 or more is abnormal.     Patient obtained a score of 30/40 today.    04/29/25: 23/40 03/11/25: 32/40 01/06/25: 39/40      -Difficulty swallowing: Solids, Liquids, and Pills     -Current diet (solids): mechanical soft/puree textures 3x/day; 1 ensure PO   -Current diet (liquids): Thin   -Current pill intake method: Takes small pills whole " "with water, takes larger pills crushed in puree   -Alternative Feeding Method?: PEG tube (taking 2 cans of TF formula/day and 1 ensure via PEG)       Repeat videofluoroscopic swallow study (VFSS) completed on 05/29/2025 revealed:  \"...Summary:  Images are on PACS for review.      Oral stage: Mild  WNL for lip closure, slow/careful/frontal/adequate mastication w/ min trial solid. Reduced bolus formation and control w/ liquids. Improved w/ cue for smaller sips and oral prep set. Transfer was adequate. Trace lingual residue. (+ Posterior lingual residue)      Pharyngeal stage:Mod  WNL for prompt swallow. Trace column of contrast between soft palate and PPW (1). Material retropulsed from the tongue base/valleculae inconsistently. ? TB intermittently moving more superior instead of posterior. Partial laryngeal elevation (1). Partial anterior hyoid excursion (1). Partial or absent epiglottic inversion (1,2), Incomplete laryngeal vestibular closure (1). Diminished pharyngeal constriction (1). Complete passage through UES/UES opening (0).  Reduced tongue base retraction/wide column of contrast (3). 1/4th cookie bolus remained at the BOT and multiple swallows were needed to clear. Min chin down and head rotation b/l did not appear effective in clearing. + Transient penetration, trace penetration, and/or aspiration of thin. Aspiration was often 2* spill of retention prior to the secondary swallow. Reduced or eliminated w/ smaller sip and oral prep set.  Cough response was inconsistent. Pt was cued to cough. Appeared to be suppressing her cough x 2. Educated that if she felt the need to cough she should cough. Retention was primarily BOT/Vallecular. Intermittent mild PPW and pyriform retention that cleared w/ secondary swallow. No penetration or aspiration w/ nectar thick, puree, or min solid trial (1/4 cookie).     Per gross esophageal screen: distal stasis that cleared w/ additional cued swallow.         Strategy Trialed y/n " " Result +/-   Oral prep set                                      Y                       +  Secondary/multiple swallows Y +   Effortful/hard swallow Y ?   Alternation w/ liquids N     Chin down/neck flexion Y -   Volitional cough/throat clear Y +/-   Head turn/rotation Y Possibly to R w/ liquds though sip size was also smaller   Breath hold/cough N     Head tilt N     Other: Smaller sip size                       Y                      +     Recommendations:  Diet:PEG for primary. Purees, (trial small bites soft/moist foods), thin water, other liquids nectar (no aspiration of nectar today). May want to avoid or limit mixed textures for now.   Meds:PEG  Strategies:Small single sips, oral prep set, multiple swallows, do not suppress cough, periodic volitional cough  Frequent/thorough oral care  Upright position  F/u ST tx: Yes per treating slp  PRN Supervision  Aspiration Precautions  Reflux Precautions  Consider consult with: nutrition following  Results reviewed with: pt  Repeat MBS as necessary  If a dedicated assessment of the esophagus is desired:  Consult w/ SLP prior to doing any additional barium studies if the pt aspirated during this study. Consider esophagram/routine barium swallow w/ barium tablet administration, [place] Upper GI/UGI, or EGD (Options may be limited if pt cannot stand/maneuver for barium studies)...\"      Treatment:     Traditional VitalStim     Channel(s) used: 1, 2   Placement: 2b   Duty Cycle: 57/1 s   Frequency: 80 pulses per second   Phase Duration: 300 microseconds   Treatment Duration: 36 minutes   Min mA level: 4.5 mA   Max mA level: 7.5 mA      Patient tolerated NMES in conjunction with pharyngeal/laryngeal strengthening exercises and P.O. intake. (The patient received instruction on the potential benefits from NMES treatment, including neuromuscular re-education and muscle strengthening.) Skin condition post-NMES: intact.      Patient consumed the following during today’s session: " peach yogurt with small pieces of whole peaches and water (thin liquid) via side of cup -- NMES remained on during pharyngeal strengthening exercises (see below).     Oral containment, mastication, bolus formation/control, AP transfer, and swallow initiation were judged to be WFL. Reduced hyolaryngeal elevation and excursion noted upon palpation. No oral cavity residue was observed. Patient without any c/o globus sensation. Patient demonstrated cough x3 with yogurt and x1 with TL. Voice returned to baseline. No other overt s/sx of penetration/aspiration noted during today's session.      -Patient completed the following pharyngeal strengthening exercises during today's session:  *Effortful swallow: 1 set of 10 reps  *Mendelsohn: 1 set of 10 reps  *Evelia: 1 set of 10 reps      Visit Tracking:  POC   Expires Auth Expiration Date ST Visit Limit   08/23/2025 or 10th visit 12/31/2025 BOMN              Visit/Unit Tracking:  Auth Status Date 05/01/25 05/06/25 05/08/25 05/13/25 05/15/25 05/20/25 06/03/25 06/05/25 06/12/25 07/01/25   Not required Used 1 2 3 4 5 6 7  8 9 10     Remaining 9 8 7 6 5 4 3  2 1 0

## 2025-07-02 ENCOUNTER — HOSPITAL ENCOUNTER (OUTPATIENT)
Dept: INFUSION CENTER | Facility: CLINIC | Age: 70
Discharge: HOME | End: 2025-07-02
Payer: MEDICARE

## 2025-07-02 DIAGNOSIS — E86.0 DEHYDRATION: Primary | ICD-10-CM

## 2025-07-02 PROCEDURE — 96361 HYDRATE IV INFUSION ADD-ON: CPT

## 2025-07-02 PROCEDURE — 96374 THER/PROPH/DIAG INJ IV PUSH: CPT

## 2025-07-02 RX ADMIN — SODIUM CHLORIDE 1000 ML: 0.9 INJECTION, SOLUTION INTRAVENOUS at 12:28

## 2025-07-02 RX ADMIN — ONDANSETRON 8 MG: 2 INJECTION INTRAMUSCULAR; INTRAVENOUS at 12:30

## 2025-07-02 NOTE — PROGRESS NOTES
Pt arrived to unit without complaint, tolerated hydration without incident, declines AVS, aware of next appt 7/7 [id number]

## 2025-07-03 ENCOUNTER — OFFICE VISIT (OUTPATIENT)
Dept: SPEECH THERAPY | Facility: CLINIC | Age: 70
End: 2025-07-03
Attending: INTERNAL MEDICINE
Payer: MEDICARE

## 2025-07-03 DIAGNOSIS — Z90.89 STATUS POST TONSILLECTOMY: ICD-10-CM

## 2025-07-03 DIAGNOSIS — R13.12 DYSPHAGIA, OROPHARYNGEAL PHASE: Primary | ICD-10-CM

## 2025-07-03 DIAGNOSIS — C09.9 RIGHT TONSILLAR SQUAMOUS CELL CARCINOMA (HCC): ICD-10-CM

## 2025-07-03 PROCEDURE — 92526 ORAL FUNCTION THERAPY: CPT | Performed by: SPEECH-LANGUAGE PATHOLOGIST

## 2025-07-03 NOTE — PROGRESS NOTES
Daily Speech Treatment Note    Today's date: 7/3/2025  Patient’s name: Maira Moura  : 1955  MRN: [id number]  Safety measures: HTN, CVA, GERD, active CA, s/p PEG tube placement, aspiration precautions  Current recommended diet: PEG tube for primary nutrition/hydration; mech soft/moist with thin liquids P.O. as tolerated   Referring provider: Harika Medina DO Encounter Diagnosis     ICD-10-CM    1. Dysphagia, oropharyngeal phase  R13.12       2. Status post tonsillectomy  Z90.89       3. 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         Visit Tracking:  POC   Expires Auth Expiration Date ST Visit Limit   2025 or 10th visit 2025 BOMN              Visit/Unit Tracking:  Auth Status Date 25            Not required Used 1              Remaining 9              Subjective/Behavioral:  -Patient reported that she had runny oatmeal for breakfast this morning.    Objective/Assessment:    Short-term goals: (: 2025)  Patient will tolerate P.O. trials of her recommended diet with the implementation of safe swallowing strategies without overt s/sx of penetration and/or aspiration during skilled therapy sessions in this certification period. -- PARTIALLY MET/ONGOING    Traditional VitalStim     Channel(s) used: 1, 2   Placement: 2b   Duty Cycle: 57/1 s   Frequency: 80 pulses per second   Phase Duration: 300 microseconds   Treatment Duration: 34 minutes   Min mA level: 6.0 mA   Max mA level: 9.0 mA      Patient tolerated NMES in conjunction with pharyngeal/laryngeal strengthening exercises and P.O. intake. (The patient received instruction on the potential benefits from NMES treatment, including neuromuscular re-education and muscle strengthening.) Skin condition post-NMES: intact.      Patient consumed the following during today’s session: chocolate pudding and water (thin liquid) via side of cup -- NMES remained on during pharyngeal strengthening exercises (see below).      Oral containment, mastication, bolus formation/control, AP transfer, and swallow initiation were judged to be WFL. Reduced hyolaryngeal elevation and excursion noted upon palpation. No oral cavity residue was observed. Patient without any c/o globus sensation. Patient demonstrated cough x1 with last sip of thin liquid water, followed by another 2 coughs during exercises. Voice returned to baseline. No other overt s/sx of penetration/aspiration noted during today's session.     Patient will independently complete pharyngeal strengthening exercises (e.g., Effortful swallow, Mendelsohn, Evelia) daily to facilitate increased pharyngeal strength and coordination. -- PARTIALLY MET/ONGOING   -Patient completed the following pharyngeal strengthening exercises during today's session:  *Effortful swallow: 2 sets of 10 reps  *Mendelsohn: 1 set of 10 reps  *Evelia: 2 sets of 10 reps    Plan:  -Patient was provided with home exercises/activities to target goals in plan of care at the end of today's session.  -Continue with current plan of care.

## 2025-07-06 NOTE — PROGRESS NOTES
Daily Speech Treatment Note    Today's date: 7/10/2025  Patient’s name: Maira Moura  : 1955  MRN: [id number]  Safety measures: HTN, CVA, GERD, active CA, s/p PEG tube placement, aspiration precautions  Current recommended diet: PEG tube for primary nutrition/hydration; mech soft/moist with thin liquids P.O. as tolerated   Referring provider: Harika Medina DO Encounter Diagnosis     ICD-10-CM    1. Dysphagia, oropharyngeal phase  R13.12       2. Status post tonsillectomy  Z90.89       3. 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 (running low on TF formula)    Objective/Assessment:    Short-term goals: (: 2025)  Patient will tolerate P.O. trials of her recommended diet with the implementation of safe swallowing strategies without overt s/sx of penetration and/or aspiration during skilled therapy sessions in this certification period. -- PARTIALLY MET/ONGOING    Traditional VitalStim     Channel(s) used: 1, 2   Placement: 2b   Duty Cycle: 57/1 s   Frequency: 80 pulses per second   Phase Duration: 300 microseconds   Treatment Duration: 40 minutes   Min mA level: 6.0 mA   Max mA level: 10.0 mA      Patient tolerated NMES in conjunction with pharyngeal/laryngeal strengthening exercises and P.O. intake. (The patient received instruction on the potential benefits from NMES treatment, including neuromuscular re-education and muscle strengthening.) Skin condition post-NMES: intact.      Patient consumed the following during today’s session: chocolate pudding and water (thin liquid) via side of cup -- NMES remained on during  pharyngeal strengthening exercises (see below).     Oral containment, mastication, bolus formation/control, AP transfer, and swallow initiation were judged to be WFL. Reduced hyolaryngeal elevation and excursion noted upon palpation. No oral cavity residue was observed. Patient without any c/o globus sensation. Patient demonstrated cough x1 on first sip of thin liquid water, followed by another 3 coughs during exercises. Voice returned to baseline. No other overt s/sx of penetration/aspiration noted during today's session.     Patient will independently complete pharyngeal strengthening exercises (e.g., Effortful swallow, Mendelsohn, Evelia) daily to facilitate increased pharyngeal strength and coordination. -- PARTIALLY MET/ONGOING   -Patient completed the following pharyngeal strengthening exercises during today's session:  *Effortful swallow: 2 sets of 10 reps  *Evelia: 2 sets of 10 reps  *Mendelsohn: 1 set of 10 reps    Plan:  -Patient was provided with home exercises/activities to target goals in plan of care at the end of today's session.  -Continue with current plan of care.

## 2025-07-07 ENCOUNTER — HOSPITAL ENCOUNTER (OUTPATIENT)
Dept: INFUSION CENTER | Facility: CLINIC | Age: 70
Discharge: HOME | End: 2025-07-07
Payer: MEDICARE

## 2025-07-07 ENCOUNTER — APPOINTMENT (OUTPATIENT)
Dept: SPEECH THERAPY | Facility: CLINIC | Age: 70
End: 2025-07-07
Attending: INTERNAL MEDICINE
Payer: MEDICARE

## 2025-07-07 DIAGNOSIS — C18.9 METASTATIC COLON CANCER TO LIVER (HCC): ICD-10-CM

## 2025-07-07 DIAGNOSIS — E86.0 DEHYDRATION: Primary | ICD-10-CM

## 2025-07-07 DIAGNOSIS — C78.7 METASTATIC COLON CANCER TO LIVER (HCC): ICD-10-CM

## 2025-07-07 LAB
ALBUMIN SERPL BCG-MCNC: 3.4 G/DL (ref 3.5–5)
ALP SERPL-CCNC: 92 U/L (ref 34–104)
ALT SERPL W P-5'-P-CCNC: 7 U/L (ref 7–52)
ANION GAP SERPL CALCULATED.3IONS-SCNC: 6 MMOL/L (ref 4–13)
AST SERPL W P-5'-P-CCNC: 19 U/L (ref 13–39)
BACTERIA UR QL AUTO: ABNORMAL /HPF
BASOPHILS # BLD AUTO: 0.02 THOUSANDS/ÂΜL (ref 0–0.1)
BASOPHILS NFR BLD AUTO: 1 % (ref 0–1)
BILIRUB SERPL-MCNC: 0.29 MG/DL (ref 0.2–1)
BILIRUB UR QL STRIP: NEGATIVE
BUN SERPL-MCNC: 29 MG/DL (ref 5–25)
CALCIUM ALBUM COR SERPL-MCNC: 9.8 MG/DL (ref 8.3–10.1)
CALCIUM SERPL-MCNC: 9.3 MG/DL (ref 8.4–10.2)
CHLORIDE SERPL-SCNC: 99 MMOL/L (ref 96–108)
CLARITY UR: CLEAR
CO2 SERPL-SCNC: 29 MMOL/L (ref 21–32)
COLOR UR: YELLOW
CREAT SERPL-MCNC: 0.91 MG/DL (ref 0.6–1.3)
EOSINOPHIL # BLD AUTO: 0.1 THOUSAND/ÂΜL (ref 0–0.61)
EOSINOPHIL NFR BLD AUTO: 3 % (ref 0–6)
ERYTHROCYTE [DISTWIDTH] IN BLOOD BY AUTOMATED COUNT: 14 % (ref 11.6–15.1)
GFR SERPL CREATININE-BSD FRML MDRD: 64 ML/MIN/1.73SQ M
GLUCOSE SERPL-MCNC: 101 MG/DL (ref 65–140)
GLUCOSE UR STRIP-MCNC: NEGATIVE MG/DL
HCT VFR BLD AUTO: 24.2 % (ref 34.8–46.1)
HGB BLD-MCNC: 8.1 G/DL (ref 11.5–15.4)
HGB UR QL STRIP.AUTO: NEGATIVE
IMM GRANULOCYTES # BLD AUTO: 0.03 THOUSAND/UL (ref 0–0.2)
IMM GRANULOCYTES NFR BLD AUTO: 1 % (ref 0–2)
KETONES UR STRIP-MCNC: NEGATIVE MG/DL
LEUKOCYTE ESTERASE UR QL STRIP: ABNORMAL
LYMPHOCYTES # BLD AUTO: 0.54 THOUSANDS/ÂΜL (ref 0.6–4.47)
LYMPHOCYTES NFR BLD AUTO: 16 % (ref 14–44)
MAGNESIUM SERPL-MCNC: 2 MG/DL (ref 1.9–2.7)
MCH RBC QN AUTO: 31.3 PG (ref 26.8–34.3)
MCHC RBC AUTO-ENTMCNC: 33.5 G/DL (ref 31.4–37.4)
MCV RBC AUTO: 93 FL (ref 82–98)
MONOCYTES # BLD AUTO: 0.35 THOUSAND/ÂΜL (ref 0.17–1.22)
MONOCYTES NFR BLD AUTO: 11 % (ref 4–12)
NEUTROPHILS # BLD AUTO: 2.29 THOUSANDS/ÂΜL (ref 1.85–7.62)
NEUTS SEG NFR BLD AUTO: 68 % (ref 43–75)
NITRITE UR QL STRIP: NEGATIVE
NON-SQ EPI CELLS URNS QL MICRO: ABNORMAL /HPF
NRBC BLD AUTO-RTO: 0 /100 WBCS
PH UR STRIP.AUTO: 5.5 [PH]
PLATELET # BLD AUTO: 153 THOUSANDS/UL (ref 149–390)
PMV BLD AUTO: 11.1 FL (ref 8.9–12.7)
POTASSIUM SERPL-SCNC: 4.1 MMOL/L (ref 3.5–5.3)
PROT SERPL-MCNC: 7.6 G/DL (ref 6.4–8.4)
PROT UR STRIP-MCNC: NEGATIVE MG/DL
RBC # BLD AUTO: 2.59 MILLION/UL (ref 3.81–5.12)
RBC #/AREA URNS AUTO: ABNORMAL /HPF
SODIUM SERPL-SCNC: 134 MMOL/L (ref 135–147)
SP GR UR STRIP.AUTO: 1.02 (ref 1–1.03)
UROBILINOGEN UR STRIP-ACNC: <2 MG/DL
WBC # BLD AUTO: 3.33 THOUSAND/UL (ref 4.31–10.16)
WBC #/AREA URNS AUTO: ABNORMAL /HPF

## 2025-07-07 PROCEDURE — 85025 COMPLETE CBC W/AUTO DIFF WBC: CPT | Performed by: INTERNAL MEDICINE

## 2025-07-07 PROCEDURE — 83550 IRON BINDING TEST: CPT

## 2025-07-07 PROCEDURE — 83735 ASSAY OF MAGNESIUM: CPT | Performed by: INTERNAL MEDICINE

## 2025-07-07 PROCEDURE — 81001 URINALYSIS AUTO W/SCOPE: CPT | Performed by: INTERNAL MEDICINE

## 2025-07-07 PROCEDURE — 36415 COLL VENOUS BLD VENIPUNCTURE: CPT

## 2025-07-07 PROCEDURE — 96374 THER/PROPH/DIAG INJ IV PUSH: CPT

## 2025-07-07 PROCEDURE — 82728 ASSAY OF FERRITIN: CPT

## 2025-07-07 PROCEDURE — 80053 COMPREHEN METABOLIC PANEL: CPT | Performed by: INTERNAL MEDICINE

## 2025-07-07 PROCEDURE — 83540 ASSAY OF IRON: CPT

## 2025-07-07 RX ADMIN — ONDANSETRON 8 MG: 2 INJECTION INTRAMUSCULAR; INTRAVENOUS at 14:52

## 2025-07-07 RX ADMIN — SODIUM CHLORIDE 1000 ML: 9 INJECTION, SOLUTION INTRAVENOUS at 14:37

## 2025-07-07 RX ADMIN — ALTEPLASE 2 MG: 2.2 INJECTION, POWDER, LYOPHILIZED, FOR SOLUTION INTRAVENOUS at 13:54

## 2025-07-08 ENCOUNTER — OFFICE VISIT (OUTPATIENT)
Dept: HEMATOLOGY ONCOLOGY | Facility: CLINIC | Age: 70
End: 2025-07-08
Payer: MEDICARE

## 2025-07-08 ENCOUNTER — TELEPHONE (OUTPATIENT)
Dept: HEMATOLOGY ONCOLOGY | Facility: CLINIC | Age: 70
End: 2025-07-08

## 2025-07-08 VITALS
WEIGHT: 154 LBS | BODY MASS INDEX: 25.66 KG/M2 | TEMPERATURE: 97.8 F | RESPIRATION RATE: 16 BRPM | SYSTOLIC BLOOD PRESSURE: 102 MMHG | HEIGHT: 65 IN | DIASTOLIC BLOOD PRESSURE: 70 MMHG | OXYGEN SATURATION: 100 % | HEART RATE: 94 BPM

## 2025-07-08 DIAGNOSIS — C18.9 METASTATIC COLON CANCER TO LIVER (HCC): Primary | ICD-10-CM

## 2025-07-08 DIAGNOSIS — D64.81 ANEMIA ASSOCIATED WITH CHEMOTHERAPY: ICD-10-CM

## 2025-07-08 DIAGNOSIS — C78.7 METASTATIC COLON CANCER TO LIVER (HCC): Primary | ICD-10-CM

## 2025-07-08 DIAGNOSIS — T45.1X5A ANEMIA ASSOCIATED WITH CHEMOTHERAPY: ICD-10-CM

## 2025-07-08 LAB
FERRITIN SERPL-MCNC: 446 NG/ML (ref 30–307)
IRON SATN MFR SERPL: 18 % (ref 15–50)
IRON SERPL-MCNC: 46 UG/DL (ref 50–212)
TIBC SERPL-MCNC: 254.8 UG/DL (ref 250–450)
TRANSFERRIN SERPL-MCNC: 182 MG/DL (ref 203–362)
UIBC SERPL-MCNC: 209 UG/DL (ref 155–355)

## 2025-07-08 PROCEDURE — 99214 OFFICE O/P EST MOD 30 MIN: CPT

## 2025-07-08 NOTE — ASSESSMENT & PLAN NOTE
70-year-old female with metastatic colon cancer to liver, locally advanced head neck cancer and early stage breast cancer.  We discussed a dose reduction due to her fatigue however, patient declined and stated that it is tolerable and manageable at this time.  Therefore, we will continue trifluridine and tipiracil (Lonsurf) 35 mg/m² twice daily every other week (days 1-5, and days 8-12) of a 28-day cycle plus bevacizumab 5 mg/kg every 2 weeks.    In regards to her diarrhea, recommended patient take Imodium as soon as she has a bout of diarrhea.  For constipation, remain hydrated and also trial a stool softener like Colace daily.  Recommend she not take Colace when she is having diarrhea.    Patient will continue with Ensure protein shake twice daily.  Provided her with samples at the office visit today.  We will have a short-term follow-up of 2 weeks with Dr. Medina for reassessment.  Plan is to obtain restaging imaging after 2 to 3 months of treatment.  Patient is aware to contact us for any additional questions/concerns or worsening symptoms.

## 2025-07-08 NOTE — TELEPHONE ENCOUNTER
Spoke with [facility] to have iron panel added on to labs patient had drawn yesterday. Confirmed it can be added.

## 2025-07-08 NOTE — PROGRESS NOTES
Name: Maira Moura      : 1955      MRN: [id number]  Encounter Provider: JOANN Martin  Encounter Date: 2025   Encounter department: [facility] [place]  :  Assessment & Plan  Anemia associated with chemotherapy    Orders:    Iron Panel (Includes Ferritin, Iron Sat%, Iron, and TIBC)    Normocytic anemia downtrending, we will check iron studies to further evaluate.  We discussed this is likely treatment related.  Metastatic colon cancer to liver (HCC)  70-year-old female with metastatic colon cancer to liver, locally advanced head neck cancer and early stage breast cancer.  We discussed a dose reduction due to her fatigue however, patient declined and stated that it is tolerable and manageable at this time.  Therefore, we will continue trifluridine and tipiracil (Lonsurf) 35 mg/m² twice daily every other week (days 1-5, and days 8-12) of a 28-day cycle plus bevacizumab 5 mg/kg every 2 weeks.    In regards to her diarrhea, recommended patient take Imodium as soon as she has a bout of diarrhea.  For constipation, remain hydrated and also trial a stool softener like Colace daily.  Recommend she not take Colace when she is having diarrhea.    Patient will continue with Ensure protein shake twice daily.  Provided her with samples at the office visit today.  We will have a short-term follow-up of 2 weeks with Dr. Medina for reassessment.  Plan is to obtain restaging imaging after 2 to 3 months of treatment.  Patient is aware to contact us for any additional questions/concerns or worsening symptoms.      No follow-ups on file.    History of Present Illness   Chief Complaint   Patient presents with    Follow-up     Oncology History   Cancer Staging   Malignant neoplasm of right female breast (HCC)  Staging form: Breast, AJCC 8th Edition  - Pathologic: Stage IA (pT2, pN0, cM0, G2, ER: Positive, AL: Positive, HER2: Negative) - Signed by Rosalva Drake MD on  1/11/2019  Histologic grading system: 3 grade system    Metastatic colon cancer to liver (HCC)  Staging form: Colon and Rectum, AJCC 8th Edition  - Clinical stage from 7/13/2023: cT3, cN0, pM1 - Signed by Harika Medina DO on 9/28/2023  Total positive nodes: 0  - Pathologic: pT3, pN0, cM1 - Signed by Vickie Israel MD on 4/3/2024  Total positive nodes: 0    Right tonsillar squamous cell carcinoma (HCC)  Staging form: Pharynx - Oropharynx, AJCC 8th Edition  - Clinical stage from 7/27/2023: Stage III (cT1, cN3, cM0, p16+) - Signed by Evan Plummer MD on 7/27/2023  Stage prefix: Initial diagnosis  Oncology History Overview Note   2/2019 - pT2N0 breast cancer treated with anastrozole, oncotype 13, ER+ VT+ Her2 neg     7/2023 - metastatic ascending colon adenocarcinoma to liver     Caris - KRAS G12D, CAIN, PD-L1 0%     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, unresectable     7/31/2023 - FOLFOX     11/20/2023 - opdivo added to FOLFOX     12/18/2023-cycle 11-20% dose reduction of 5-FU bolus and oxaliplatin due to increased fatigue     Jan 2024 - oxaliplatin removed from treatment plan due to neuropathy - PET scan shows good disease control in all areas except colon lesion     3/2024 - right hemicolectomy - pT3N0     6/3/24 - cryoablation to liver (no interruption to systemic therapy)     7/30/2024 - stop folfox     9/2024 - 10/2024 - cis/RT to tonsils and cervical Lns     12/2024 - disease progression of colon cancer in the liver     01/2025 - Initiated FOLFIRI every 14-days, with 50% dose-reduction of Irinotecan due to pre-existing diarrhea.     02/05/2025 - Cancelled Cycle 3 of FOLFIRI due to significant chemotherapy-induced toxicities (Including: Neutropenia). Will remove 5-Fluorouracil bolus and growth factor support beginning with subsequent cycles of treatment.    5/21/2025: Progression - new and enlarging hepatic mets, small pulmonary nodules    6/11/2025: Discontinue FOLFIRI, start  "Trifluridine and Tipiracil PO BID days 1-5 and 8-12 on a 28 day cycle PLUS Bevacizumab 5mg/kg IV days 1 and 15         Malignant neoplasm of right female breast (HCC)   11/23/2018 Initial Diagnosis    Malignant neoplasm of right female breast (HCC)     11/23/2018 Biopsy    Rt Breast US BX 1100 8cmfn, 4 passes 12g Marquee:  - Invasive breast carcinoma of no special type (ductal NST/invasive ductal carcinoma).  - grade 2  - %  - [place] 95%  - HER2 negative     12/20/2018 Surgery    Right partial mastectomy and SLN biopsy:  Infiltrating ductal carcinoma, Grade 2/3, felt to be between 2.0 cm and 2.5 cm in greatest dimension  - No invasive tumor is seen at inked margins, but there is a focus of DCIS seen within approximately 1mm of the inked medial margin  - no LVI  - no LCIS  - 0/2 LN's  at least Stage IIA - pT2, pN0, cM0, G2.    - Dr Coronado     12/20/2018 -  Cancer Staged    Stage IIA - pT2, pN0, cM0, G2.       1/4/2019 Genomic Testing    Oncotype DX score: 13     2/1/2019 -  Hormone Therapy    anastrozole 1 mg daily as adjuvant endocrine therapy    - Dr Daley       2/14/2019 - 4/3/2019 Radiation    Course: C1    Plan ID Energy Fractions Dose per Fraction (cGy) Dose Correction (cGy) Total Dose Delivered (cGy) Elapsed Days   R Breast 10X/6X 25 / 25 200 0 5,000 40   R BRST BOOST 16E 5 / 5 200 0 1,000 7      Treatment dates:  C1: 2/14/2019 - 4/3/2019       Metastatic colon cancer to liver (HCC)   7/6/2023 Genetic Testing    CARIS Results:   KRAS Pathogenic Variant Exon 2  p.G12D : lack of benefit of cetuximab, panitumumab Level 2   No other actionable mutation; MSI stable; TMB low   MiFOLOXAi Results: \"Decreased Benefit of FOLFOX + Bevacizumab in first line metastatic CRC.  This patient may achieve improved results by receiving an alternative to FOLFOX, such as FOLFIRI, as their initial regimen. As an adjustment to frontline FOLFOXIRI following toxicity: This patient may achieve improved results by removing the " "oxaliplatin portion of their regimen\".         7/11/2023 Initial Diagnosis    Metastatic colon cancer to liver (HCC)     7/13/2023 -  Cancer Staged    Staging form: Colon and Rectum, AJCC 8th Edition  - Clinical stage from 7/13/2023: cT3, cN0, pM1 - Signed by Harika Medina DO on 9/28/2023  Total positive nodes: 0       7/31/2023 - 8/1/2024 Chemotherapy    cyanocobalamin, 1,000 mcg, Intramuscular, Every 30 days, 7 of 9 cycles  Administration: 1,000 mcg (5/9/2024), 1,000 mcg (5/24/2024), 1,000 mcg (6/20/2024), 1,000 mcg (7/3/2024), 1,000 mcg (7/18/2024), 1,000 mcg (8/1/2024)  potassium chloride, 20 mEq, Intravenous, Once, 2 of 2 cycles  Administration: 20 mEq (11/6/2023), 20 mEq (11/20/2023)  alteplase (CATHFLO), 2 mg, Intracatheter, Every 1 Minute as needed, 21 of 23 cycles  Administration: 2 mg (1/3/2024)  pegfilgrastim (NEULASTA), 6 mg, Subcutaneous, Once, 12 of 12 cycles  Administration: 6 mg (10/25/2023), 6 mg (11/8/2023), 6 mg (11/22/2023), 6 mg (12/6/2023), 6 mg (12/20/2023), 6 mg (1/12/2024), 6 mg (1/25/2024), 6 mg (4/25/2024), 6 mg (5/9/2024), 6 mg (5/24/2024), 6 mg (6/20/2024)  fluorouracil (ADRUCIL), 895 mg, Intravenous, Once, 21 of 23 cycles  Dose modification: 320 mg/m2 (original dose 400 mg/m2, Cycle 11)  Administration: 900 mg (7/31/2023), 900 mg (8/14/2023), 900 mg (8/28/2023), 900 mg (9/11/2023), 900 mg (9/25/2023), 900 mg (10/9/2023), 900 mg (10/23/2023), 895 mg (11/6/2023), 895 mg (11/20/2023), 895 mg (12/4/2023), 700 mg (12/18/2023), 680 mg (1/10/2024), 680 mg (1/23/2024), 625 mg (4/23/2024), 625 mg (5/7/2024), 625 mg (5/22/2024), 625 mg (6/4/2024), 625 mg (6/18/2024), 625 mg (7/1/2024), 625 mg (7/16/2024), 625 mg (7/30/2024)  nivolumab (OPDIVO) IVPB, 240 mg (100 % of original dose 240 mg), Intravenous, Once, 13 of 15 cycles  Dose modification: 240 mg (original dose 240 mg, Cycle 9)  Administration: 240 mg (11/20/2023), 240 mg (12/4/2023), 240 mg (12/18/2023), 240 mg (1/10/2024), 240 mg " (1/23/2024), 240 mg (4/23/2024), 240 mg (5/7/2024), 240 mg (5/22/2024), 240 mg (6/4/2024), 240 mg (6/18/2024), 240 mg (7/1/2024), 240 mg (7/16/2024), 240 mg (7/30/2024)  leucovorin calcium IVPB, 896 mg, Intravenous, Once, 21 of 23 cycles  Administration: 900 mg (7/31/2023), 900 mg (8/14/2023), 900 mg (8/28/2023), 900 mg (9/11/2023), 900 mg (9/25/2023), 900 mg (10/9/2023), 900 mg (10/23/2023), 900 mg (11/6/2023), 900 mg (11/20/2023), 900 mg (12/4/2023), 900 mg (12/18/2023), 850 mg (1/10/2024), 850 mg (1/23/2024), 800 mg (4/23/2024), 800 mg (5/7/2024), 800 mg (5/22/2024), 800 mg (6/4/2024), 800 mg (6/18/2024), 800 mg (7/1/2024), 800 mg (7/16/2024), 800 mg (7/30/2024)  oxaliplatin (ELOXATIN) chemo infusion, 85 mg/m2 = 190.4 mg, Intravenous, Once, 12 of 12 cycles  Dose modification: 68 mg/m2 (original dose 85 mg/m2, Cycle 11, Reason: Other (Must fill in a comment), Comment: increased fatigue)  Administration: 190.4 mg (7/31/2023), 190.4 mg (8/14/2023), 200 mg (8/28/2023), 200 mg (9/11/2023), 200 mg (9/25/2023), 200 mg (10/9/2023), 200 mg (10/23/2023), 200 mg (11/6/2023), 200 mg (11/20/2023), 190.4 mg (12/4/2023), 150 mg (12/18/2023), 150 mg (1/10/2024)  fluorouracil (ADRUCIL) ambulatory infusion Soln, 1,200 mg/m2/day = 5,375 mg, Intravenous, Over 46 hours, 21 of 23 cycles     9/26/2023 -  Cancer Staged    Staging form: Colon and Rectum, AJCC 8th Edition  - Pathologic: pT3, pN0, cM1 - Signed by Vickie Israel MD on 4/3/2024  Total positive nodes: 0       3/12/2024 Surgery    A. Terminal ileum, appendix, right colon (right hemicolectomy):  - Adenocarcinoma (5.2cm)  - Forty-nine (49) lymph nodes negative for carcinoma (0/49)    - Acellular mucin present in one (1) lymph node   - See staging synoptic (ypT3N0)     1/8/2025 - 5/16/2025 Chemotherapy    fluorouracil (ADRUCIL), 400 mg/m2 = 770 mg, 2 of 2 cycles  Administration: 770 mg (1/8/2025), 770 mg (1/22/2025)  irinotecan (CAMPTOSAR) chemo infusion, 173 mg (50 % of  original dose 180 mg/m2), 8 of 11 cycles  Dose modification: 90 mg/m2 (original dose 180 mg/m2, Cycle 1, Reason: Anticipated Tolerance, Comment: 50% dose reduction due to pre-existing diarrhea)  Administration: 180 mg (1/8/2025), 180 mg (1/22/2025), 160 mg (3/5/2025), 160 mg (3/19/2025), 160 mg (4/2/2025), 160 mg (4/16/2025), 160 mg (4/30/2025), 160 mg (5/14/2025)  leucovorin calcium IVPB, 768 mg, 2 of 2 cycles  Administration: 800 mg (1/8/2025), 800 mg (1/22/2025)  fluorouracil (ADRUCIL) ambulatory infusion Soln, 1,200 mg/m2/day = 4,610 mg, 8 of 11 cycles     6/11/2025 -  Chemotherapy    bevacizumab (AVASTIN) IVPB, 5 mg/kg = 362.5 mg (50 % of original dose 10 mg/kg), 2 of 6 cycles  Dose modification: 5 mg/kg (original dose 10 mg/kg, Cycle 1, Reason: Other (Must fill in a comment), Comment: per protocol)  Administration: 362.5 mg (6/11/2025), 362.5 mg (6/25/2025)     Right tonsillar squamous cell carcinoma (HCC)   7/27/2023 Initial Diagnosis    Right tonsillar squamous cell carcinoma (HCC)     7/27/2023 -  Cancer Staged    Staging form: Pharynx - Oropharynx, AJCC 8th Edition  - Clinical stage from 7/27/2023: Stage III (cT1, cN3, cM0, p16+) - Signed by Evan Plummer MD on 7/27/2023  Stage prefix: Initial diagnosis       9/16/2024 - 11/13/2024 Radiation      Plan ID Energy Fractions Dose per Fraction (cGy) Dose Correction (cGy) Total Dose Delivered (cGy) Elapsed Days   Head_Neck 6X-FFF 35 / 35 200 0 7,000 58    C2: 9/16/2024 - 11/13/2024 9/17/2024 - 10/21/2024 Chemotherapy    alteplase (CATHFLO), 2 mg, Intracatheter, Every 1 Minute as needed, 6 of 6 cycles  Administration: 2 mg (10/21/2024)  CISplatin (PLATINOL) infusion, 70 mg (original dose 40 mg/m2), Intravenous, Once, 6 of 6 cycles  Dose modification: 70 mg (original dose 40 mg/m2, Cycle 1, Reason: Anticipated Tolerance), 30 mg/m2 (original dose 40 mg/m2, Cycle 4, Reason: Neuropathy, Comment: dose reduction to 30 mg/m2 due to  neuropathy)  Administration: 70 mg (9/17/2024), 70 mg (9/23/2024), 70 mg (9/30/2024), 59.1 mg (10/7/2024), 59.1 mg (10/14/2024), 59.1 mg (10/21/2024)  sodium chloride, 500 mL, Intravenous, Once, 6 of 6 cycles  Administration: 500 mL (9/17/2024), 500 mL (9/17/2024), 500 mL (9/23/2024), 500 mL (9/23/2024), 500 mL (9/30/2024), 500 mL (9/30/2024), 500 mL (10/7/2024), 500 mL (10/7/2024), 500 mL (10/14/2024), 500 mL (10/14/2024), 500 mL (10/21/2024)  fosaprepitant (EMEND) IVPB, 150 mg, Intravenous, Once, 6 of 6 cycles  Administration: 150 mg (9/17/2024), 150 mg (9/23/2024), 150 mg (9/30/2024), 150 mg (10/7/2024), 150 mg (10/14/2024), 150 mg (10/21/2024)  IVPB builder, , Intravenous, Once, 1 of 1 cycle  Administration: Unknown dose (10/21/2024)        Pertinent Medical History     07/08/25: Patient presents for a follow up of her metastatic colon cancer to liver, locally advanced head/neck cancer and early stage hormone positive Her2- right sided breast cancer.  Due to progression of disease in May 2025, patient is currently on trifluridine and tipiracil (Lonsurf) 35 mg/m² twice daily every other week (days 1-5, and days 8-12) of a 28-day cycle plus bevacizumab 5 mg/kg every 2 weeks.  In addition, she has hydration twice a week.    Patient is currently on cycle 2-day 2 of trifluridine and tipiracil and is scheduled for her cycle 3 bevacizumab infusion on 7/9/2025.  Patient endorses increased fatigue, reports it has worsened since initiating this treatment where she is napping a lot.  She has some mild nausea, Zofran offering relief.  She had multiple days of diarrhea last week, about 5 days with 2-3 bouts a day, she reports she took Imodium only when it was getting worse.  Patient has also noticed a decrease in her appetite.  She is only eating about 2 meals and not finishing them.  She was 156 pounds at the last office visit, today she is 154 pounds.  She is doing 2 rounds of tube feeds and drinks 2 ensures a day.  She  "continues to have neuropathy bilateral lower and upper extremities.  Denies any fevers, infections.  Patient denies abnormal bleeding: epistaxis, gingival bleeding, hematuria, dark tarry stools.  Patient's blood pressure is stable 102/70.    Labs:  7/7/2025: Hgb 8.1, MCV 93, WBC 3.33, ANC 2.29, platelets 153,000, creatinine 0.91, EGFR 64, liver enzymes WNL, UA demonstrates moderate leukocytes with no nitrites or protein.          Objective   /70 (BP Location: Left arm, Patient Position: Sitting, Cuff Size: Large)   Pulse 94   Temp 97.8 °F (36.6 °C) (Temporal)   Resp 16   Ht 5' 5\" (1.651 m)   Wt 69.9 kg (154 lb)   SpO2 100%   BMI 25.63 kg/m²     Pain Screening:  Pain Score: 0-No pain  ECOG   2  Physical Exam  Constitutional:       Appearance: Normal appearance.   HENT:      Head: Normocephalic and atraumatic.     Cardiovascular:      Rate and Rhythm: Regular rhythm.      Heart sounds: Normal heart sounds.   Pulmonary:      Breath sounds: Normal breath sounds.     Musculoskeletal:      Cervical back: Normal range of motion.      Right lower leg: No edema.      Left lower leg: No edema.     Skin:     General: Skin is warm and dry.     Neurological:      Mental Status: She is alert and oriented to person, place, and time.     Psychiatric:         Mood and Affect: Mood normal.         Behavior: Behavior normal.         Thought Content: Thought content normal.         Judgment: Judgment normal.         Labs: I have reviewed the following labs:  Lab Results   Component Value Date/Time    WBC 3.33 (L) 07/07/2025 02:35 PM    RBC 2.59 (L) 07/07/2025 02:35 PM    Hemoglobin 8.1 (L) 07/07/2025 02:35 PM    Hematocrit 24.2 (L) 07/07/2025 02:35 PM    MCV 93 07/07/2025 02:35 PM    MCH 31.3 07/07/2025 02:35 PM    RDW 14.0 07/07/2025 02:35 PM    Platelets 153 07/07/2025 02:35 PM    Segmented % 68 07/07/2025 02:35 PM    Lymphocytes % 16 07/07/2025 02:35 PM    Monocytes % 11 07/07/2025 02:35 PM    Eosinophils Relative 3 " 07/07/2025 02:35 PM    Basophils Relative 1 07/07/2025 02:35 PM    Immature Grans % 1 07/07/2025 02:35 PM    Absolute Neutrophils 2.29 07/07/2025 02:35 PM     Lab Results   Component Value Date/Time    Potassium 4.1 07/07/2025 02:35 PM    Chloride 99 07/07/2025 02:35 PM    CO2 29 07/07/2025 02:35 PM    BUN 29 (H) 07/07/2025 02:35 PM    Creatinine 0.91 07/07/2025 02:35 PM    Glucose, Fasting 102 (H) 10/30/2024 04:34 AM    Calcium 9.3 07/07/2025 02:35 PM    Corrected Calcium 9.8 07/07/2025 02:35 PM    AST 19 07/07/2025 02:35 PM    ALT 7 07/07/2025 02:35 PM    Alkaline Phosphatase 92 07/07/2025 02:35 PM    Total Protein 7.6 07/07/2025 02:35 PM    Albumin 3.4 (L) 07/07/2025 02:35 PM    Total Bilirubin 0.29 07/07/2025 02:35 PM    eGFR 64 07/07/2025 02:35 PM   I spent 30 minutes in chart review, counseling, coordination of care, and documentation.    This note has been generated by voice recognition software system.  Therefore, there may be spelling, grammar, and or syntax errors. Please contact if questions arise.

## 2025-07-09 ENCOUNTER — HOSPITAL ENCOUNTER (OUTPATIENT)
Dept: INFUSION CENTER | Facility: CLINIC | Age: 70
Discharge: HOME | End: 2025-07-09
Attending: INTERNAL MEDICINE
Payer: MEDICARE

## 2025-07-09 ENCOUNTER — NUTRITION (OUTPATIENT)
Dept: NUTRITION | Facility: CLINIC | Age: 70
End: 2025-07-09

## 2025-07-09 ENCOUNTER — TELEPHONE (OUTPATIENT)
Age: 70
End: 2025-07-09

## 2025-07-09 VITALS
DIASTOLIC BLOOD PRESSURE: 78 MMHG | WEIGHT: 152.4 LBS | HEIGHT: 65 IN | RESPIRATION RATE: 18 BRPM | BODY MASS INDEX: 25.39 KG/M2 | SYSTOLIC BLOOD PRESSURE: 116 MMHG | TEMPERATURE: 97.9 F | HEART RATE: 87 BPM

## 2025-07-09 DIAGNOSIS — C18.9 METASTATIC COLON CANCER TO LIVER (HCC): ICD-10-CM

## 2025-07-09 DIAGNOSIS — E86.0 DEHYDRATION: Primary | ICD-10-CM

## 2025-07-09 DIAGNOSIS — Z71.3 NUTRITIONAL COUNSELING: Primary | ICD-10-CM

## 2025-07-09 DIAGNOSIS — C78.7 METASTATIC COLON CANCER TO LIVER (HCC): ICD-10-CM

## 2025-07-09 LAB
ALBUMIN SERPL BCG-MCNC: 3.5 G/DL (ref 3.5–5)
ALP SERPL-CCNC: 91 U/L (ref 34–104)
ALT SERPL W P-5'-P-CCNC: 7 U/L (ref 7–52)
ANION GAP SERPL CALCULATED.3IONS-SCNC: 6 MMOL/L (ref 4–13)
AST SERPL W P-5'-P-CCNC: 23 U/L (ref 13–39)
BASOPHILS # BLD AUTO: 0.01 THOUSANDS/ÂΜL (ref 0–0.1)
BASOPHILS NFR BLD AUTO: 0 % (ref 0–1)
BILIRUB SERPL-MCNC: 0.34 MG/DL (ref 0.2–1)
BUN SERPL-MCNC: 26 MG/DL (ref 5–25)
CALCIUM SERPL-MCNC: 9.2 MG/DL (ref 8.4–10.2)
CHLORIDE SERPL-SCNC: 102 MMOL/L (ref 96–108)
CO2 SERPL-SCNC: 27 MMOL/L (ref 21–32)
CREAT SERPL-MCNC: 0.92 MG/DL (ref 0.6–1.3)
EOSINOPHIL # BLD AUTO: 0.07 THOUSAND/ÂΜL (ref 0–0.61)
EOSINOPHIL NFR BLD AUTO: 3 % (ref 0–6)
ERYTHROCYTE [DISTWIDTH] IN BLOOD BY AUTOMATED COUNT: 14.1 % (ref 11.6–15.1)
GFR SERPL CREATININE-BSD FRML MDRD: 63 ML/MIN/1.73SQ M
GLUCOSE SERPL-MCNC: 100 MG/DL (ref 65–140)
HCT VFR BLD AUTO: 24.7 % (ref 34.8–46.1)
HGB BLD-MCNC: 8.3 G/DL (ref 11.5–15.4)
IMM GRANULOCYTES # BLD AUTO: 0.01 THOUSAND/UL (ref 0–0.2)
IMM GRANULOCYTES NFR BLD AUTO: 0 % (ref 0–2)
LYMPHOCYTES # BLD AUTO: 0.39 THOUSANDS/ÂΜL (ref 0.6–4.47)
LYMPHOCYTES NFR BLD AUTO: 14 % (ref 14–44)
MCH RBC QN AUTO: 31.1 PG (ref 26.8–34.3)
MCHC RBC AUTO-ENTMCNC: 33.6 G/DL (ref 31.4–37.4)
MCV RBC AUTO: 93 FL (ref 82–98)
MONOCYTES # BLD AUTO: 0.35 THOUSAND/ÂΜL (ref 0.17–1.22)
MONOCYTES NFR BLD AUTO: 13 % (ref 4–12)
NEUTROPHILS # BLD AUTO: 1.88 THOUSANDS/ÂΜL (ref 1.85–7.62)
NEUTS SEG NFR BLD AUTO: 70 % (ref 43–75)
NRBC BLD AUTO-RTO: 0 /100 WBCS
PLATELET # BLD AUTO: 307 THOUSANDS/UL (ref 149–390)
PMV BLD AUTO: 10.5 FL (ref 8.9–12.7)
POTASSIUM SERPL-SCNC: 3.9 MMOL/L (ref 3.5–5.3)
PROT SERPL-MCNC: 7.6 G/DL (ref 6.4–8.4)
RBC # BLD AUTO: 2.67 MILLION/UL (ref 3.81–5.12)
SODIUM SERPL-SCNC: 135 MMOL/L (ref 135–147)
WBC # BLD AUTO: 2.71 THOUSAND/UL (ref 4.31–10.16)

## 2025-07-09 PROCEDURE — 85025 COMPLETE CBC W/AUTO DIFF WBC: CPT | Performed by: INTERNAL MEDICINE

## 2025-07-09 PROCEDURE — 80053 COMPREHEN METABOLIC PANEL: CPT | Performed by: INTERNAL MEDICINE

## 2025-07-09 PROCEDURE — 96413 CHEMO IV INFUSION 1 HR: CPT

## 2025-07-09 PROCEDURE — 96361 HYDRATE IV INFUSION ADD-ON: CPT

## 2025-07-09 PROCEDURE — 96375 TX/PRO/DX INJ NEW DRUG ADDON: CPT

## 2025-07-09 RX ORDER — SODIUM CHLORIDE 9 MG/ML
20 INJECTION, SOLUTION INTRAVENOUS ONCE
Status: CANCELLED | OUTPATIENT
Start: 2025-07-09

## 2025-07-09 RX ORDER — SODIUM CHLORIDE 9 MG/ML
20 INJECTION, SOLUTION INTRAVENOUS ONCE
Status: COMPLETED | OUTPATIENT
Start: 2025-07-09 | End: 2025-07-09

## 2025-07-09 RX ADMIN — SODIUM CHLORIDE 20 ML/HR: 9 INJECTION, SOLUTION INTRAVENOUS at 13:13

## 2025-07-09 RX ADMIN — BEVACIZUMAB 362.5 MG: 400 INJECTION, SOLUTION INTRAVENOUS at 13:53

## 2025-07-09 RX ADMIN — SODIUM CHLORIDE 1000 ML: 0.9 INJECTION, SOLUTION INTRAVENOUS at 12:37

## 2025-07-09 RX ADMIN — ONDANSETRON 8 MG: 2 INJECTION INTRAMUSCULAR; INTRAVENOUS at 12:39

## 2025-07-09 NOTE — PROGRESS NOTES
Patient tolerated hydration and Avastin without incident. Labs collected per order. AVS declined by patient. Patient is aware of appointment on 7/14/25 at 1:30pm.

## 2025-07-09 NOTE — PROGRESS NOTES
Outpatient Oncology Nutrition Consultation   Type of Consult: Follow Up   Care Location: [facility]    Reason for referral: Received notification by [facility] PEPE ZAPATA on 8/21/24 that pt has triggered for oncology nutrition care (reason for referral: HNC dx and Malnutrition Screening Tool (MST) Triggers: scored a 0 indicating No recent wt loss and is not eating poorly due to a decreased appetite. 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, unresectable     7/31/2023 - FOLFOX     11/20/2023 - opdivo added to FOLFOX     12/18/2023-cycle 11-20% dose reduction of 5-FU bolus and oxaliplatin due to increased fatigue     Jan 2024 - oxaliplatin removed from treatment plan due to neuropathy - PET scan shows good disease control in all areas except colon lesion     3/2024 - right hemicolectomy - pT3N0     6/3/24 - cryoablation to liver (no interruption to systemic therapy)     7/30/2024 - stop folfox     9/2024 - 10/2024 - cis/RT to tonsils and cervical Lns      12/2024 - disease progression of colon cancer in the liver     01/2025 - Initiated FOLFIRI every 14-days, with 50% dose-reduction of Irinotecan due to pre-existing diarrhea.     02/05/2025 - Cancelled Cycle 3 of FOLFIRI due to significant chemotherapy-induced toxicities (Including: Neutropenia). Will remove 5-Fluorouracil bolus and growth factor support beginning with subsequent cycles of treatment.    5/21/2025: Progression - new and enlarging hepatic mets, small pulmonary nodules    6/11/2025: Discontinue FOLFIRI, start Trifluridine and Tipiracil PO BID days 1-5 and 8-12 on a 28 day cycle PLUS Bevacizumab 5mg/kg IV days 1 and 15         Malignant neoplasm of right female breast (HCC)   11/23/2018 Initial Diagnosis    Malignant neoplasm of right female breast (HCC)     11/23/2018 Biopsy    Rt Breast US BX 1100 8cmfn, 4 passes 12g Marquee:  - Invasive breast carcinoma of no special type (ductal NST/invasive ductal carcinoma).  - grade 2  - %  - MO 95%  - HER2 negative     12/20/2018 Surgery    Right partial mastectomy and SLN biopsy:  Infiltrating ductal carcinoma, Grade 2/3, felt to be between 2.0 cm and 2.5 cm in greatest dimension  - No invasive tumor is seen at inked margins, but there is a focus of DCIS seen within approximately 1mm of the inked medial margin  - no LVI  - no LCIS  - 0/2 LN's  at least Stage IIA - pT2, pN0, cM0, G2.    - Dr Coronado     12/20/2018 -  Cancer Staged    Stage IIA - pT2, pN0, cM0, G2.       1/4/2019 Genomic Testing    Oncotype DX score: 13     2/1/2019 -  Hormone Therapy    anastrozole 1 mg daily as adjuvant endocrine therapy    - Dr Daley       2/14/2019 - 4/3/2019 Radiation    Course: C1    Plan ID Energy Fractions Dose per Fraction (cGy) Dose Correction (cGy) Total Dose Delivered (cGy) Elapsed Days   R Breast 10X/6X 25 / 25 200 0 5,000 40   R BRST BOOST 16E 5 / 5 200 0 1,000 7      Treatment dates:  C1: 2/14/2019 - 4/3/2019       Metastatic colon cancer to liver (HCC)  "  7/6/2023 Genetic Testing    CARIS Results:   KRAS Pathogenic Variant Exon 2  p.G12D : lack of benefit of cetuximab, panitumumab Level 2   No other actionable mutation; MSI stable; TMB low   MiFOLOXAi Results: \"Decreased Benefit of FOLFOX + Bevacizumab in first line metastatic CRC.  This patient may achieve improved results by receiving an alternative to FOLFOX, such as FOLFIRI, as their initial regimen. As an adjustment to frontline FOLFOXIRI following toxicity: This patient may achieve improved results by removing the oxaliplatin portion of their regimen\".         7/11/2023 Initial Diagnosis    Metastatic colon cancer to liver (HCC)     7/13/2023 -  Cancer Staged    Staging form: Colon and Rectum, AJCC 8th Edition  - Clinical stage from 7/13/2023: cT3, cN0, pM1 - Signed by Harika Medina DO on 9/28/2023  Total positive nodes: 0       7/31/2023 - 8/1/2024 Chemotherapy    cyanocobalamin, 1,000 mcg, Intramuscular, Every 30 days, 7 of 9 cycles  Administration: 1,000 mcg (5/9/2024), 1,000 mcg (5/24/2024), 1,000 mcg (6/20/2024), 1,000 mcg (7/3/2024), 1,000 mcg (7/18/2024), 1,000 mcg (8/1/2024)  potassium chloride, 20 mEq, Intravenous, Once, 2 of 2 cycles  Administration: 20 mEq (11/6/2023), 20 mEq (11/20/2023)  alteplase (CATHFLO), 2 mg, Intracatheter, Every 1 Minute as needed, 21 of 23 cycles  Administration: 2 mg (1/3/2024)  pegfilgrastim (NEULASTA), 6 mg, Subcutaneous, Once, 12 of 12 cycles  Administration: 6 mg (10/25/2023), 6 mg (11/8/2023), 6 mg (11/22/2023), 6 mg (12/6/2023), 6 mg (12/20/2023), 6 mg (1/12/2024), 6 mg (1/25/2024), 6 mg (4/25/2024), 6 mg (5/9/2024), 6 mg (5/24/2024), 6 mg (6/20/2024)  fluorouracil (ADRUCIL), 895 mg, Intravenous, Once, 21 of 23 cycles  Dose modification: 320 mg/m2 (original dose 400 mg/m2, Cycle 11)  Administration: 900 mg (7/31/2023), 900 mg (8/14/2023), 900 mg (8/28/2023), 900 mg (9/11/2023), 900 mg (9/25/2023), 900 mg (10/9/2023), 900 mg (10/23/2023), 895 mg (11/6/2023), 895 " mg (11/20/2023), 895 mg (12/4/2023), 700 mg (12/18/2023), 680 mg (1/10/2024), 680 mg (1/23/2024), 625 mg (4/23/2024), 625 mg (5/7/2024), 625 mg (5/22/2024), 625 mg (6/4/2024), 625 mg (6/18/2024), 625 mg (7/1/2024), 625 mg (7/16/2024), 625 mg (7/30/2024)  nivolumab (OPDIVO) IVPB, 240 mg (100 % of original dose 240 mg), Intravenous, Once, 13 of 15 cycles  Dose modification: 240 mg (original dose 240 mg, Cycle 9)  Administration: 240 mg (11/20/2023), 240 mg (12/4/2023), 240 mg (12/18/2023), 240 mg (1/10/2024), 240 mg (1/23/2024), 240 mg (4/23/2024), 240 mg (5/7/2024), 240 mg (5/22/2024), 240 mg (6/4/2024), 240 mg (6/18/2024), 240 mg (7/1/2024), 240 mg (7/16/2024), 240 mg (7/30/2024)  leucovorin calcium IVPB, 896 mg, Intravenous, Once, 21 of 23 cycles  Administration: 900 mg (7/31/2023), 900 mg (8/14/2023), 900 mg (8/28/2023), 900 mg (9/11/2023), 900 mg (9/25/2023), 900 mg (10/9/2023), 900 mg (10/23/2023), 900 mg (11/6/2023), 900 mg (11/20/2023), 900 mg (12/4/2023), 900 mg (12/18/2023), 850 mg (1/10/2024), 850 mg (1/23/2024), 800 mg (4/23/2024), 800 mg (5/7/2024), 800 mg (5/22/2024), 800 mg (6/4/2024), 800 mg (6/18/2024), 800 mg (7/1/2024), 800 mg (7/16/2024), 800 mg (7/30/2024)  oxaliplatin (ELOXATIN) chemo infusion, 85 mg/m2 = 190.4 mg, Intravenous, Once, 12 of 12 cycles  Dose modification: 68 mg/m2 (original dose 85 mg/m2, Cycle 11, Reason: Other (Must fill in a comment), Comment: increased fatigue)  Administration: 190.4 mg (7/31/2023), 190.4 mg (8/14/2023), 200 mg (8/28/2023), 200 mg (9/11/2023), 200 mg (9/25/2023), 200 mg (10/9/2023), 200 mg (10/23/2023), 200 mg (11/6/2023), 200 mg (11/20/2023), 190.4 mg (12/4/2023), 150 mg (12/18/2023), 150 mg (1/10/2024)  fluorouracil (ADRUCIL) ambulatory infusion Soln, 1,200 mg/m2/day = 5,375 mg, Intravenous, Over 46 hours, 21 of 23 cycles     9/26/2023 -  Cancer Staged    Staging form: Colon and Rectum, AJCC 8th Edition  - Pathologic: pT3, pN0, cM1 - Signed by Vickie MARISCAL  MD Jhonatan on 4/3/2024  Total positive nodes: 0       3/12/2024 Surgery    A. Terminal ileum, appendix, right colon (right hemicolectomy):  - Adenocarcinoma (5.2cm)  - Forty-nine (49) lymph nodes negative for carcinoma (0/49)    - Acellular mucin present in one (1) lymph node   - See staging synoptic (ypT3N0)     1/8/2025 - 5/16/2025 Chemotherapy    fluorouracil (ADRUCIL), 400 mg/m2 = 770 mg, 2 of 2 cycles  Administration: 770 mg (1/8/2025), 770 mg (1/22/2025)  irinotecan (CAMPTOSAR) chemo infusion, 173 mg (50 % of original dose 180 mg/m2), 8 of 11 cycles  Dose modification: 90 mg/m2 (original dose 180 mg/m2, Cycle 1, Reason: Anticipated Tolerance, Comment: 50% dose reduction due to pre-existing diarrhea)  Administration: 180 mg (1/8/2025), 180 mg (1/22/2025), 160 mg (3/5/2025), 160 mg (3/19/2025), 160 mg (4/2/2025), 160 mg (4/16/2025), 160 mg (4/30/2025), 160 mg (5/14/2025)  leucovorin calcium IVPB, 768 mg, 2 of 2 cycles  Administration: 800 mg (1/8/2025), 800 mg (1/22/2025)  fluorouracil (ADRUCIL) ambulatory infusion Soln, 1,200 mg/m2/day = 4,610 mg, 8 of 11 cycles     6/11/2025 -  Chemotherapy    bevacizumab (AVASTIN) IVPB, 5 mg/kg = 362.5 mg (50 % of original dose 10 mg/kg), 3 of 6 cycles  Dose modification: 5 mg/kg (original dose 10 mg/kg, Cycle 1, Reason: Other (Must fill in a comment), Comment: per protocol)  Administration: 362.5 mg (6/11/2025), 362.5 mg (6/25/2025)     Right tonsillar squamous cell carcinoma (HCC)   7/27/2023 Initial Diagnosis    Right tonsillar squamous cell carcinoma (HCC)     7/27/2023 -  Cancer Staged    Staging form: Pharynx - Oropharynx, AJCC 8th Edition  - Clinical stage from 7/27/2023: Stage III (cT1, cN3, cM0, p16+) - Signed by Evan Plummer MD on 7/27/2023  Stage prefix: Initial diagnosis       9/16/2024 - 11/13/2024 Radiation      Plan ID Energy Fractions Dose per Fraction (cGy) Dose Correction (cGy) Total Dose Delivered (cGy) Elapsed Days   Head_Neck 6X-FFF 35 / 35 200 0  7,000 58    C2: 9/16/2024 - 11/13/2024 9/17/2024 - 10/21/2024 Chemotherapy    alteplase (CATHFLO), 2 mg, Intracatheter, Every 1 Minute as needed, 6 of 6 cycles  Administration: 2 mg (10/21/2024)  CISplatin (PLATINOL) infusion, 70 mg (original dose 40 mg/m2), Intravenous, Once, 6 of 6 cycles  Dose modification: 70 mg (original dose 40 mg/m2, Cycle 1, Reason: Anticipated Tolerance), 30 mg/m2 (original dose 40 mg/m2, Cycle 4, Reason: Neuropathy, Comment: dose reduction to 30 mg/m2 due to neuropathy)  Administration: 70 mg (9/17/2024), 70 mg (9/23/2024), 70 mg (9/30/2024), 59.1 mg (10/7/2024), 59.1 mg (10/14/2024), 59.1 mg (10/21/2024)  sodium chloride, 500 mL, Intravenous, Once, 6 of 6 cycles  Administration: 500 mL (9/17/2024), 500 mL (9/17/2024), 500 mL (9/23/2024), 500 mL (9/23/2024), 500 mL (9/30/2024), 500 mL (9/30/2024), 500 mL (10/7/2024), 500 mL (10/7/2024), 500 mL (10/14/2024), 500 mL (10/14/2024), 500 mL (10/21/2024)  fosaprepitant (EMEND) IVPB, 150 mg, Intravenous, Once, 6 of 6 cycles  Administration: 150 mg (9/17/2024), 150 mg (9/23/2024), 150 mg (9/30/2024), 150 mg (10/7/2024), 150 mg (10/14/2024), 150 mg (10/21/2024)  IVPB builder, , Intravenous, Once, 1 of 1 cycle  Administration: Unknown dose (10/21/2024)       Past Medical & Surgical Hx:   Patient Active Problem List   Diagnosis    Primary hypertension    Mixed hyperlipidemia    Malignant neoplasm of right female breast (HCC)    Vitamin D deficiency    Prediabetes    Right thyroid nodule    GERD (gastroesophageal reflux disease)    Obesity    Metastatic colon cancer to liver (HCC)    Right tonsillar squamous cell carcinoma (HCC)    Poor appetite    Nutritional anemia    Dehydration    Drug-induced constipation    Chemotherapy induced neutropenia (HCC)    Stage 3a chronic kidney disease (HCC)    Thrombocytopenia (HCC)    Neuropathy    Diastolic dysfunction    Hypokalemia    Leukemoid reaction    GOGO (acute kidney injury) (HCC)    Acute  post-operative pain    At risk for constipation    At risk for delirium    Palliative care encounter    Physical deconditioning    History of CVA (cerebrovascular accident)    Chronic nasal congestion    Elevated LFTs    Vitamin B12 deficiency    Neoplastic malignant related fatigue    Sensation, choking    S/P percutaneous endoscopic gastrostomy (PEG) tube placement (HCC)    Pancytopenia due to chemotherapy (HCC)    Cancer related pain    Hypomagnesemia    Functional diarrhea    Antineoplastic chemotherapy induced anemia    Hypertensive heart and renal disease with congestive heart failure (HCC)     Past Medical History:   Diagnosis Date    Anemia     Arthritis     Body mass index (BMI) 40.0-44.9, adult (HCC) 9/22/2023    Breast cancer (HCC) 12/17/2018    Cancer (HCC)     right breast, colon, liver, right tonsil    Cervical lymphadenopathy 3/21/2023    CT neck on 3/30/2023- Large right level 2A lymphadenopathy as described above and suspicious for neoplasm.  Correlation with histopathology is recommended.  Mild asymmetric prominence of the right palatine tonsil with otherwise no definitive nodular enhancing lesions identified along the course of the aerodigestive tract.     5/26/23- FNA of this node was nonrevealing for tissue etiology, but it d    Encounter for screening for HIV 07/07/2022    Follow-up examination 04/04/2023    GERD (gastroesophageal reflux disease)     Hyperlipidemia     Hypertension     Obesity     Stroke (HCC)     TIA     Stroke (HCC)     TIA     Transaminitis 09/22/2021    Vasomotor rhinitis 8/9/2018    Refilled flonase today. Stopped taking since January 2022     Past Surgical History:   Procedure Laterality Date    BREAST BIOPSY Right 11/23/2018    us guided bx cancer    BREAST SURGERY      COLONOSCOPY      ESOPHAGOSCOPY N/A 07/20/2023    Procedure: ESOPHAGOSCOPY;  Surgeon: David Chapa MD;  [facility];  Service: ENT    HEMICOLOECTOMY W/ ANASTOMOSIS Right 3/12/2024     Procedure: RIGHT COLECTOMY WITH ROBOTICS;  Surgeon: Vickie Israel MD;  [facility];  Service: Surgical Oncology    IR BIOPSY LIVER MASS  2023    IR CRYOABLATION  6/3/2024    IR GASTROSTOMY (G) TUBE CHECK/CHANGE/REINSERTION/UPSIZE  2025    IR GASTROSTOMY TUBE PLACEMENT  10/2/2024    IR PORT CHECK  2024    IR PORT PLACEMENT  2023    IR PORT STRIPPING  2024    LAPAROTOMY N/A 3/12/2024    Procedure: LAPAROTOMY EXPLORATORY W/ ROBOTICS;  Surgeon: Vickie Israel MD;  [facility]: [facility];  Service: Surgical Oncology    RI BIOPSY NASOPHARYNX VISIBLE LESION SIMPLE N/A 10/9/2024    Procedure: NASOPHARYNGOSCOPY with biospy;  Surgeon: Juanito Matthew MD;  [facility];  Service: ENT    RI BRNCHSC INCL FLUOR GDNCE DX W/CELL WASHG SPX N/A 2023    Procedure: BRONCHOSCOPY;  Surgeon: David Chapa MD;  [facility];  Service: ENT    RI LARYNGOSCOPY W/BIOPSY MICROSCOPE/TELESCOPE N/A 2023    Procedure: MICRODIRECT LARYNGOSCOPY WITH BIOPSY;  Surgeon: Daivd Chapa MD;  [facility];  Service: ENT    RI LARYNGOSCOPY W/WO TRACHEOSCOPY DX EXCEPT  N/A 10/9/2024    Procedure: DIRECT LARYNGOSCOPY;  Surgeon: Juanito Matthew MD;  [facility];  Service: ENT    RI MASTECTOMY PARTIAL W/AXILLARY LYMPHADENECTOMY Right 2018    Procedure: ULTRASOUND LOCALIZED PARTIAL MASTECTOMY W/SENTINEL NODE BIOPSY POSS AXILLARY DISSECTION;  Surgeon: Zahida Coronado MD;  Location: [facility];  Service: General    RI TONSILLECTOMY PRIMARY/SECONDARY AGE 12/> N/A 10/9/2024    Procedure: TONSILLECTOMY;  Surgeon: Juanito Matthew MD;  [facility];  Service: ENT    TUBAL LIGATION      US GUIDED BREAST BIOPSY RIGHT COMPLETE Right 2018    US GUIDED INJECTION FOR RESEARCH STUDY  2018    US GUIDED INJECTION FOR RESEARCH STUDY  2018    US GUIDED INJECTION FOR RESEARCH STUDY  2019    US GUIDED LYMPH NODE BIOPSY RIGHT   05/26/2023       Review of Medications:   Vitamins, Supplements and Herbals: No, pt denies taking supplements    Current Outpatient Medications:     acetaminophen (TYLENOL) 160 mg/5 mL liquid, Take 20 mL (640 mg total) by mouth every 6 (six) hours as needed for mild pain for up to 10 days, Disp: 473 mL, Rfl: 3    anastrozole (ARIMIDEX) 1 mg tablet, Take 1 tablet (1 mg total) by mouth daily, Disp: 90 tablet, Rfl: 0    atorvastatin (LIPITOR) 40 mg tablet, Take 1 tablet (40 mg total) by mouth daily at bedtime, Disp: 90 tablet, Rfl: 3    cholestyramine (QUESTRAN) 4 g packet, Take 1 packet (4 g total) by mouth 3 (three) times a day with meals, Disp: 90 packet, Rfl: 3    diphenhydramine, lidocaine, Al/Mg hydroxide, simethicone (Magic Mouthwash) SUSP, Swish and swallow 10 mL every 4 (four) hours as needed for mouth pain or discomfort, Disp: 480 mL, Rfl: 2    docusate sodium (COLACE) 50 mg capsule, Take 1 capsule (50 mg total) by mouth 2 (two) times a day, Disp: 90 capsule, Rfl: 1    dronabinol (MARINOL) 2.5 mg capsule, Take 1 capsule (2.5 mg total) by mouth in the morning, Disp: 5 capsule, Rfl: 0    ergocalciferol (VITAMIN D2) 50,000 units, Take 1 capsule (50,000 Units total) by mouth once a week, Disp: 90 capsule, Rfl: 0    folic acid (FOLVITE) 1 mg tablet, Take 1 tablet (1 mg total) by mouth in the morning, Disp: 90 tablet, Rfl: 3    gabapentin (NEURONTIN) 300 mg capsule, Take 1 pills in the morning, 1 pill at lunch and 2 pills before bed, Disp: 120 capsule, Rfl: 5    hydrocortisone 1 % ointment, , Disp: , Rfl:     ibuprofen (MOTRIN) 100 mg/5 mL suspension, Take 20 mL (400 mg total) by mouth every 6 (six) hours as needed for moderate pain (Patient not taking: Reported on 1/31/2025), Disp: 473 mL, Rfl: 0    lidocaine-prilocaine (EMLA) cream, Apply topically as needed for mild pain (Patient not taking: Reported on 12/6/2024), Disp: 30 g, Rfl: 3    losartan (COZAAR) 50 mg tablet, Take 1 tablet (50 mg total) by mouth daily,  Disp: 90 tablet, Rfl: 2    magnesium Oxide (MAG-OX) 400 mg TABS, 1 tablet (400 mg total) by Per G Tube route 2 (two) times a day (Patient not taking: Reported on 1/31/2025), Disp: 60 tablet, Rfl: 0    mirtazapine (REMERON) 15 mg tablet, Take 1 tablet (15 mg total) by mouth daily at bedtime Patient is also on a 30 mg tablet, for a total dose of 45 mg, Disp: 30 tablet, Rfl: 0    mirtazapine (REMERON) 30 mg tablet, Take 1 tablet (30 mg total) by mouth daily at bedtime, Disp: 30 tablet, Rfl: 0    nystatin (MYCOSTATIN) 500,000 units/5 mL suspension, Apply 5 mL (500,000 Units total) to the mouth or throat 4 (four) times a day, Disp: 600 mL, Rfl: 3    omeprazole (PriLOSEC) 20 mg delayed release capsule, Take 1 capsule (20 mg total) by mouth daily, Disp: 30 capsule, Rfl: 3    ondansetron (ZOFRAN) 8 mg tablet, Take 1 tablet (8 mg total) by mouth every 8 (eight) hours as needed for nausea or vomiting, Disp: 90 tablet, Rfl: 2    oxyCODONE (ROXICODONE) 5 mg/5 mL solution, Take 5 mL (5 mg total) by mouth every 6 (six) hours as needed for severe pain ((breakthrough pain)) Max Daily Amount: 20 mg, Disp: 473 mL, Rfl: 0    potassium chloride (Klor-Con M20) 20 mEq tablet, Take 1 tablet (20 mEq total) by mouth 2 (two) times a day (Patient not taking: Reported on 1/31/2025), Disp: 90 tablet, Rfl: 1    potassium chloride 10% oral solution, 15 mL (20 mEq total) by Per G Tube route 2 (two) times a day, Disp: 473 mL, Rfl: 0    prochlorperazine (COMPAZINE) 10 mg tablet, Take 1 tablet (10 mg total) by mouth every 6 (six) hours as needed for nausea or vomiting, Disp: 90 tablet, Rfl: 2    pyridoxine (VITAMIN B6) 50 mg tablet, Take 1 tablet (50 mg total) by mouth daily, Disp: 90 tablet, Rfl: 3    Trifluridine-Tipiracil 20-8.19 MG TABS, Take 3 tablets by mouth 2 (two) times a day Days 1-5 and 8-12 every 28 days, Disp: 60 tablet, Rfl: 5    vitamin B-12 (VITAMIN B-12) 1,000 mcg tablet, , Disp: , Rfl:   No current facility-administered medications  "for this visit.    Facility-Administered Medications Ordered in Other Visits:     alteplase (CATHFLO) injection 2 mg, 2 mg, Intracatheter, Q1MIN PRN, Harika Agostino, DO, 2 mg at 07/07/25 1354    alteplase (CATHFLO) injection 2 mg, 2 mg, Intracatheter, Q1MIN PRN, Harika Agostino, DO    bevacizumab (AVASTIN) 362.5 mg in sodium chloride 0.9 % 100 mL IVPB, 5 mg/kg (Treatment Plan Recorded), Intravenous, Once, Harika Agostino, DO    sodium chloride 0.9 % bolus 1,000 mL, 1,000 mL, Intravenous, Once, Harika Agostino, DO, Last Rate: 1,000 mL/hr at 07/09/25 1237, 1,000 mL at 07/09/25 1237    Most Recent Lab Results:   Lab Results   Component Value Date    WBC 3.33 (L) 07/07/2025    NEUTROABS 2.29 07/07/2025    IRON 46 (L) 07/07/2025    TIBC 254.8 07/07/2025    FERRITIN 446 (H) 07/07/2025    CHOLESTEROL 167 04/24/2024    TRIG 137 04/24/2024    HDL 43 (L) 04/24/2024    LDLCALC 97 04/24/2024    ALT 7 07/07/2025    AST 19 07/07/2025    ALB 3.4 (L) 07/07/2025    SODIUM 134 (L) 07/07/2025    SODIUM 135 06/30/2025    K 4.1 07/07/2025    K 4.2 06/30/2025    CL 99 07/07/2025    BUN 29 (H) 07/07/2025    BUN 24 06/30/2025    CREATININE 0.91 07/07/2025    CREATININE 0.95 06/30/2025    EGFR 64 07/07/2025    PHOS 2.8 11/01/2024    PHOS 3.3 10/30/2024    TSH 1.58 01/03/2022    GLUCOSE 209 (H) 03/12/2024    POCGLU 89 12/27/2024    GLUF 102 (H) 10/30/2024    GLUF 95 09/13/2024    GLUC 101 07/07/2025    HGBA1C 5.5 02/21/2024    HGBA1C 5.9 (H) 06/13/2023    HGBA1C 6.1 (H) 07/09/2022    CALCIUM 9.3 07/07/2025    FOLATE 14.1 02/28/2025    MG 2.0 07/07/2025       Anthropometric Measurements:   Height: 66\"  Ht Readings from Last 1 Encounters:   07/09/25 5' 5\" (1.651 m)     Wt Readings from Last 20 Encounters:   07/09/25 69.1 kg (152 lb 6.4 oz)   07/08/25 69.9 kg (154 lb)   06/25/25 70.3 kg (154 lb 15.7 oz)   06/24/25 70.8 kg (156 lb)   06/18/25 70.7 kg (155 lb 12.8 oz)   06/11/25 70.6 kg (155 lb 10.3 oz)   05/27/25 72.5 kg (159 lb 12.8 oz) "   05/14/25 72.6 kg (160 lb 0.9 oz)   05/07/25 73 kg (161 lb)   04/30/25 73.5 kg (162 lb)   04/30/25 73 kg (161 lb)   04/16/25 73.5 kg (162 lb)   04/02/25 72.6 kg (160 lb 0.9 oz)   04/01/25 72.6 kg (160 lb)   03/19/25 70.8 kg (156 lb 1.4 oz)   03/18/25 71.2 kg (157 lb)   03/05/25 73.1 kg (161 lb 2.5 oz)   02/28/25 72.6 kg (160 lb)   02/18/25 73 kg (161 lb)   02/04/25 73.4 kg (161 lb 12.8 oz)       Weight History:   Usual Weight: 275#  Varian: (2/22/19) 264.6#, (4/2/19) 263.4, (9/16/24) 197.4#, (9/23/24) 194#, (9/30/24) 192.8#, (10/7/24) 190.2#, (10/11/24) 194.6#, (10/14/24) 191#, (10/17/24) 192.8#, (10/21/24) 190#, (10/24/24) 190#, (10/28/24) 189#, (11/11/24) 185#  Home Scale: n/a    Oncology Nutrition-Anthropometrics      Flowsheet Row Nutrition from 7/9/2025 in [facility] Nutrition from 6/25/2025 in [facility]   Patient [age] (years): 70 years 70 years   Patient (female) height (in): 61 in 61 in   Current Weight to be used for anthropometric calculations (lbs) 154 lbs 155 lbs   Current Weight to be used for anthropometric calculations (kg) 70 kg 70.5 kg   BMI: 29.1 29.3   IBW female: 105 lbs 105 lbs   IBW (kg) female: 47.7 kg 47.7 kg   IBW % (female) 146.7 % 147.6 %   Adjusted BW (female): 117.3 lbs 117.5 lbs   Adjusted BW kg (female): 53.3 kg 53.4 kg   % weight change after 1 week: -- -0.5 %   Weight change after 1 week (lbs) -- -0.8 lbs   % weight change after 1 month: -1 % -3 %   Weight change after 1 month (lbs) -1.6 lbs -4.8 lbs   % weight change after 3 months: -3.8 % -0.7 %   Weight change after 3 months (lbs) -6.1 lbs -1.1 lbs            Nutrition-Focused Physical Findings: none observed    Food/Nutrition-Related History & Client/Social History:    Current Nutrition Impact Symptoms:  [] Nausea  [x] Reduced Appetite  [] Acid Reflux    [] Vomiting  [x] Unintended Wt Loss -stable over the last month with some fluctuations []  Malabsorption    [] Diarrhea -resolved [] Unintended Wt Gain  [] Dumping Syndrome    [] Constipation -BM every other day [] Thick Mucous/Secretions  [] Abdominal Pain    [x] Dysgeusia (Altered Taste) -starting to improve [x] Xerostomia (Dry Mouth)  [] Gas    [] Dysosmia (Altered Smell)  [] Thrush  [] Difficulty Chewing    [] Oral Mucositis (Sore Mouth)  [x] Fatigue  [] Hyperglycemia   Lab Results   Component Value Date    HGBA1C 5.5 2024      [x] Odynophagia -improving [] Esophagitis  [] Other:    [x] Dysphagia   Follows with SLP  Video swallow -recommending pureed diet with thin liquids [] Early Satiety  [] No Problems Eating      Food Allergies & Intolerances: no    Current Diet: Soft/Moist and Tube Feeding  Current Nutrition Intake: Unchanged from last visit  Appetite: Fair   Nutrition Route: PO and PEG  Oral Care: brushes daily  Activity level: ADLs.     24 Hr Diet Recall:   Breakfast: oatmeal  Dinner: mashed potatoes     Beverages: water (sips throughout the day)  Supplements:   Ensure Complete (10 oz, 350 kcal, 30 g pro) -2 daily sometimes will drink them, sometimes puts them through PEG. Has been drinking them more lately    EN Recall:  DME: [employer]  Access Type: PEG  Formula: [employer] Peptide 1.5  Method: Bolus  Regimen: 11oz (325 mL) 2  times per day of [employer] 1.5 via PEG (gravity syringe method to administer boluses; 1 container infusing over ~15-20 minutes).  Flush: 60 mL free water flush  pre/post each bolus (120 mL x 2 boluses = 240 mL)  EN providin mL volume (2 cartons/day), 1000 kcal (46% est needs), 48g protein (55% est needs), 456 mL free water  Pt also flushing 1-2 syringes every few hours for added hydration. Pt gets IVF 3x/week.       Oncology Nutrition-Estimated Needs      Flowsheet Row Nutrition from 2025 in [facility] Nutrition from 2025 in [facility]   Weight type used Actual  weight Actual weight   Weight in kilograms (kg) used for estimated needs 73.2 kg 77.3 kg   Energy needs formula:  30-35 kcal/kg 30-35 kcal/kg   Energy needs based on 30 kcal/k kcal 2318 kcal   Energy needs based on 35 kcal/k kcal 2705 kcal   Protein needs formula: 1.2-1.5 g/kg 1.2-1.5 g/kg   Protein needs based on 1.2 g/k g 93 g   Protein needs based on 1.5 g/kg 110 g 116 g   Fluid needs formula: 30-35 mL/kg 30-35 mL/kg   Fluid needs based on 30 mL/kg 2196 mL 2319 mL   Fluid needs in ounces 74 oz 78 oz   Fluid needs based on 35 mL/kg 2562 mL 2706 mL   Fluid needs in ounces 87 oz 91 oz             Discussion & Intervention:   Maira was evaluated today for an RD follow up regarding HNC and enteral nutrition.  Maira has completed tx for HNC and is currently undergoing tx for metastatic colon cancer.  met with Maira today in the infusion center. She continues to use her PEG, 2 cartons of scroll kit daily.  She is eating small amounts of soft foods like oatmeal and mashed potatoes- usually does this twice/day. She continues to work with SLP. She is drinking 2 Ensure complete shakes daily. Weight has been stable. I encouraged her to reach out to GI as they attempted to call her to schedule an appt for TF management as she is running low on formula and has about 1 weeks' worth left. Provided phone number of GI RN.  Reviewed 24 hour recall, which revealed an adequate enteral intake and PO intake, and discussed ways to increase kcal, protein, and fluid intakes and optimize nutrient intake.  Also reviewed the importance of wt management throughout the tx process and the role of enteral nutrition in managing wt and overall health.  Based on today's assessment, discussion included: MNT for:   fluid, soft  diet,  enteral nutrition, practicing proper oral care, adequate hydration & fluid choices, utilizing oral nutrition supplements, practicing proper feeding tube use & care, adherence to and  compliance with enteral nutrition plan of care, and individualized enteral nutrition recommendations & plan:  .   Moving forward, Maira was encouraged to increase kcal, protein, and fluid intakes via PEG and oral intake as able  Materials Provided: Ensure samples and Qumu samples  All questions and concerns addressed during today’s visit.  Maira has RD contact information.    Nutrition Diagnosis:   Inadequate Energy Intake related to physiological causes, disease state and treatment related issues as evidenced by food recall, wt loss and discussion with pt and/or family.  Increased Nutrient Needs (kcal & pro) related to increased demand for nutrients and disease state as evidenced by cancer dx and pt undergoing tx for cancer.  Swallowing Difficulty related to diagnosis/treatment related causes as evidenced by  need for feeding tube, diet restriction per SLP.  Monitoring & Evaluation:   Goals:  weight maintenance/stabilization  adequate nutrition impact symptom management  pt to meet >/=75% estimated nutrition needs daily  Utilize PEG feeds    Progress Towards Goals: Progressing    Nutrition Rx & Recommendations:  Diet: enteral nutrition as 1' source of nutrition, soft diet-high calorie high protein  Stay hydrated by sipping fluids of choice/tolerance throughout the day.    Follow proper oral care; Try baking soda/salt water rinse recipe (mix 3/4 tsp salt + 1 tsp baking soda + 1 qt water; rinse with plain water after using) in Eating Hints book (pg 18).  Brush your teeth before/after meals & before bed.  Weigh yourself regularly. If you notice weight loss, make an effort to increase your daily food/calorie intake. If you continue to notice loss after these efforts, reach out to your dietitian to establish a plan to stabilize weight.   Continue with Ensure Plus/Complete 2 times daily. try drinking orally, but OK to put through PEG if tolerated better  Choose liquids with calories: whole milk, [employer] milk  (higher protein/lactose-free milk), chocolate milk, drinkable yogurt, kefir, 100% fruit juice, diluted juice, bone broth (higher protein broth), creamy soups, sports drinks (Gatorade, Poweraide, Pedialyte, etc.), Italian ice, popsicles, milkshakes, smoothies, oral nutrition supplements (Ensure, Boost, etc.), gelatin/Jello, etc.  Soft/easy to swallow foods that are high in calories and protein: casseroles, chicken/egg/tuna salad with extra garcia to add calories and moisture, oatmeal/cream of wheat made with whole milk, cottage cheese, whole milk Greek yogurt, scrambled eggs with cheese, avocado, macaroni and cheese, mashed potatoes made with butter and whole milk or dry milk powder, ground meat with extra sauce/gravy, canned fruit, peanut butter, cream soups (cream of chicken, cream of mushroom, broccoli cheddar), pudding made with whole milk, custard, ice cream, banana, milkshakes/smoothies, Review page 47 of Eating Hints Book for more ideas.       TF plan -   Continue at least 2 cartons of [employer] 1.5 daily  If PO decreases and weight drops more, consider increasing to goal TF which is:  [employer] 1.5 4 cartons daily (1300 mL total volume, 2000 calories, 96g protein, and 910 mL free water)  Contact RD for substitutions  Call [facility] when you have 10 days left of TF supplies: [phone], option 3 (customer support).      Follow Up Plan: during infusion on 7/23/25   Recommend Referral to Other Providers: none at this time

## 2025-07-09 NOTE — PATIENT INSTRUCTIONS
Nutrition Rx & Recommendations:  Diet: enteral nutrition as 1' source of nutrition, soft diet-high calorie high protein  Stay hydrated by sipping fluids of choice/tolerance throughout the day.   Follow proper oral care; Try baking soda/salt water rinse recipe (mix 3/4 tsp salt + 1 tsp baking soda + 1 qt water; rinse with plain water after using) in Eating Hints book (pg 18).  Brush your teeth before/after meals & before bed.  Weigh yourself regularly. If you notice weight loss, make an effort to increase your daily food/calorie intake. If you continue to notice loss after these efforts, reach out to your dietitian to establish a plan to stabilize weight.   Continue with Ensure Plus/Complete 2 times daily. try drinking orally, but OK to put through PEG if tolerated better  Choose liquids with calories: whole milk, [employer] milk (higher protein/lactose-free milk), chocolate milk, drinkable yogurt, kefir, 100% fruit juice, diluted juice, bone broth (higher protein broth), creamy soups, sports drinks (Gatorade, Poweraide, Pedialyte, etc.), Italian ice, popsicles, milkshakes, smoothies, oral nutrition supplements (Ensure, Boost, etc.), gelatin/Jello, etc.  Soft/easy to swallow foods that are high in calories and protein: casseroles, chicken/egg/tuna salad with extra garcia to add calories and moisture, oatmeal/cream of wheat made with whole milk, cottage cheese, whole milk Greek yogurt, scrambled eggs with cheese, avocado, macaroni and cheese, mashed potatoes made with butter and whole milk or dry milk powder, ground meat with extra sauce/gravy, canned fruit, peanut butter, cream soups (cream of chicken, cream of mushroom, broccoli cheddar), pudding made with whole milk, custard, ice cream, banana, milkshakes/smoothies, Review page 47 of Eating Hints Book for more ideas.       TF plan -   Continue at least 2 cartons of [employer] 1.5 daily  If PO decreases and weight drops more, consider increasing to goal TF which  is:  [employer] 1.5 4 cartons daily (1300 mL total volume, 2000 calories, 96g protein, and 910 mL free water)  Contact RD for substitutions  Call [facility] when you have 10 days left of TF supplies: [phone], option 3 (customer support).      Follow Up Plan: during infusion on 7/23/25   Recommend Referral to Other Providers: none at this time

## 2025-07-10 ENCOUNTER — OFFICE VISIT (OUTPATIENT)
Dept: SPEECH THERAPY | Facility: CLINIC | Age: 70
End: 2025-07-10
Attending: INTERNAL MEDICINE
Payer: MEDICARE

## 2025-07-10 DIAGNOSIS — Z90.89 STATUS POST TONSILLECTOMY: ICD-10-CM

## 2025-07-10 DIAGNOSIS — C09.9 RIGHT TONSILLAR SQUAMOUS CELL CARCINOMA (HCC): ICD-10-CM

## 2025-07-10 DIAGNOSIS — R13.12 DYSPHAGIA, OROPHARYNGEAL PHASE: Primary | ICD-10-CM

## 2025-07-10 PROCEDURE — 92526 ORAL FUNCTION THERAPY: CPT | Performed by: SPEECH-LANGUAGE PATHOLOGIST

## 2025-07-14 ENCOUNTER — HOSPITAL ENCOUNTER (OUTPATIENT)
Dept: INFUSION CENTER | Facility: CLINIC | Age: 70
Discharge: HOME | End: 2025-07-14
Attending: INTERNAL MEDICINE
Payer: MEDICARE

## 2025-07-14 VITALS — HEART RATE: 82 BPM | SYSTOLIC BLOOD PRESSURE: 114 MMHG | DIASTOLIC BLOOD PRESSURE: 75 MMHG

## 2025-07-14 DIAGNOSIS — E86.0 DEHYDRATION: Primary | ICD-10-CM

## 2025-07-14 LAB
BASOPHILS # BLD AUTO: 0.01 THOUSANDS/ÂΜL (ref 0–0.1)
BASOPHILS NFR BLD AUTO: 1 % (ref 0–1)
EOSINOPHIL # BLD AUTO: 0.02 THOUSAND/ÂΜL (ref 0–0.61)
EOSINOPHIL NFR BLD AUTO: 1 % (ref 0–6)
ERYTHROCYTE [DISTWIDTH] IN BLOOD BY AUTOMATED COUNT: 13.8 % (ref 11.6–15.1)
HCT VFR BLD AUTO: 25.8 % (ref 34.8–46.1)
HGB BLD-MCNC: 8.4 G/DL (ref 11.5–15.4)
IMM GRANULOCYTES # BLD AUTO: 0.01 THOUSAND/UL (ref 0–0.2)
IMM GRANULOCYTES NFR BLD AUTO: 1 % (ref 0–2)
LYMPHOCYTES # BLD AUTO: 0.32 THOUSANDS/ÂΜL (ref 0.6–4.47)
LYMPHOCYTES NFR BLD AUTO: 19 % (ref 14–44)
MAGNESIUM SERPL-MCNC: 1.9 MG/DL (ref 1.9–2.7)
MCH RBC QN AUTO: 30.1 PG (ref 26.8–34.3)
MCHC RBC AUTO-ENTMCNC: 32.6 G/DL (ref 31.4–37.4)
MCV RBC AUTO: 93 FL (ref 82–98)
MONOCYTES # BLD AUTO: 0.2 THOUSAND/ÂΜL (ref 0.17–1.22)
MONOCYTES NFR BLD AUTO: 12 % (ref 4–12)
NEUTROPHILS # BLD AUTO: 1.14 THOUSANDS/ÂΜL (ref 1.85–7.62)
NEUTS SEG NFR BLD AUTO: 66 % (ref 43–75)
NRBC BLD AUTO-RTO: 0 /100 WBCS
PLATELET # BLD AUTO: 316 THOUSANDS/UL (ref 149–390)
PMV BLD AUTO: 9.8 FL (ref 8.9–12.7)
RBC # BLD AUTO: 2.79 MILLION/UL (ref 3.81–5.12)
WBC # BLD AUTO: 1.7 THOUSAND/UL (ref 4.31–10.16)

## 2025-07-14 PROCEDURE — 96361 HYDRATE IV INFUSION ADD-ON: CPT

## 2025-07-14 PROCEDURE — 83735 ASSAY OF MAGNESIUM: CPT | Performed by: INTERNAL MEDICINE

## 2025-07-14 PROCEDURE — 85025 COMPLETE CBC W/AUTO DIFF WBC: CPT | Performed by: INTERNAL MEDICINE

## 2025-07-14 PROCEDURE — 96374 THER/PROPH/DIAG INJ IV PUSH: CPT

## 2025-07-14 RX ADMIN — SODIUM CHLORIDE 1000 ML: 0.9 INJECTION, SOLUTION INTRAVENOUS at 13:55

## 2025-07-14 RX ADMIN — ONDANSETRON 8 MG: 2 INJECTION INTRAMUSCULAR; INTRAVENOUS at 13:59

## 2025-07-14 NOTE — PROGRESS NOTES
Pt tolerated labs, hydration, and zofran without incident. Pt declined AVS, aware of next appt 7/16 at 11:30am.

## 2025-07-15 ENCOUNTER — APPOINTMENT (OUTPATIENT)
Dept: SPEECH THERAPY | Facility: CLINIC | Age: 70
End: 2025-07-15
Attending: INTERNAL MEDICINE
Payer: MEDICARE

## 2025-07-15 DIAGNOSIS — Z90.89 STATUS POST TONSILLECTOMY: ICD-10-CM

## 2025-07-15 DIAGNOSIS — R13.12 DYSPHAGIA, OROPHARYNGEAL PHASE: Primary | ICD-10-CM

## 2025-07-15 DIAGNOSIS — C09.9 RIGHT TONSILLAR SQUAMOUS CELL CARCINOMA (HCC): ICD-10-CM

## 2025-07-16 ENCOUNTER — HOSPITAL ENCOUNTER (OUTPATIENT)
Dept: INFUSION CENTER | Facility: CLINIC | Age: 70
Discharge: HOME | End: 2025-07-16
Attending: INTERNAL MEDICINE
Payer: MEDICARE

## 2025-07-16 ENCOUNTER — TELEMEDICINE (OUTPATIENT)
Age: 70
End: 2025-07-16
Payer: MEDICARE

## 2025-07-16 VITALS
HEART RATE: 77 BPM | DIASTOLIC BLOOD PRESSURE: 71 MMHG | TEMPERATURE: 97.3 F | RESPIRATION RATE: 18 BRPM | SYSTOLIC BLOOD PRESSURE: 110 MMHG

## 2025-07-16 VITALS — WEIGHT: 152 LBS | HEIGHT: 65 IN | BODY MASS INDEX: 25.33 KG/M2

## 2025-07-16 DIAGNOSIS — C09.9 RIGHT TONSILLAR SQUAMOUS CELL CARCINOMA (HCC): ICD-10-CM

## 2025-07-16 DIAGNOSIS — Z93.1: Primary | ICD-10-CM

## 2025-07-16 DIAGNOSIS — E86.0 DEHYDRATION: Primary | ICD-10-CM

## 2025-07-16 LAB
ALBUMIN SERPL BCG-MCNC: 3.4 G/DL (ref 3.5–5)
ALP SERPL-CCNC: 88 U/L (ref 34–104)
ALT SERPL W P-5'-P-CCNC: 8 U/L (ref 7–52)
ANION GAP SERPL CALCULATED.3IONS-SCNC: 5 MMOL/L (ref 4–13)
AST SERPL W P-5'-P-CCNC: 22 U/L (ref 13–39)
BASOPHILS # BLD AUTO: 0.01 THOUSANDS/ÂΜL (ref 0–0.1)
BASOPHILS NFR BLD AUTO: 1 % (ref 0–1)
BILIRUB SERPL-MCNC: 0.33 MG/DL (ref 0.2–1)
BUN SERPL-MCNC: 30 MG/DL (ref 5–25)
CALCIUM ALBUM COR SERPL-MCNC: 9.8 MG/DL (ref 8.3–10.1)
CALCIUM SERPL-MCNC: 9.3 MG/DL (ref 8.4–10.2)
CHLORIDE SERPL-SCNC: 101 MMOL/L (ref 96–108)
CO2 SERPL-SCNC: 28 MMOL/L (ref 21–32)
CREAT SERPL-MCNC: 0.91 MG/DL (ref 0.6–1.3)
EOSINOPHIL # BLD AUTO: 0.02 THOUSAND/ÂΜL (ref 0–0.61)
EOSINOPHIL NFR BLD AUTO: 1 % (ref 0–6)
ERYTHROCYTE [DISTWIDTH] IN BLOOD BY AUTOMATED COUNT: 14.1 % (ref 11.6–15.1)
GFR SERPL CREATININE-BSD FRML MDRD: 64 ML/MIN/1.73SQ M
GLUCOSE SERPL-MCNC: 94 MG/DL (ref 65–140)
HCT VFR BLD AUTO: 24.3 % (ref 34.8–46.1)
HGB BLD-MCNC: 8 G/DL (ref 11.5–15.4)
IMM GRANULOCYTES # BLD AUTO: 0.01 THOUSAND/UL (ref 0–0.2)
IMM GRANULOCYTES NFR BLD AUTO: 1 % (ref 0–2)
LYMPHOCYTES # BLD AUTO: 0.53 THOUSANDS/ÂΜL (ref 0.6–4.47)
LYMPHOCYTES NFR BLD AUTO: 28 % (ref 14–44)
MAGNESIUM SERPL-MCNC: 2 MG/DL (ref 1.9–2.7)
MCH RBC QN AUTO: 30.4 PG (ref 26.8–34.3)
MCHC RBC AUTO-ENTMCNC: 32.9 G/DL (ref 31.4–37.4)
MCV RBC AUTO: 92 FL (ref 82–98)
MONOCYTES # BLD AUTO: 0.26 THOUSAND/ÂΜL (ref 0.17–1.22)
MONOCYTES NFR BLD AUTO: 14 % (ref 4–12)
NEUTROPHILS # BLD AUTO: 1.07 THOUSANDS/ÂΜL (ref 1.85–7.62)
NEUTS SEG NFR BLD AUTO: 55 % (ref 43–75)
NRBC BLD AUTO-RTO: 0 /100 WBCS
PLATELET # BLD AUTO: 233 THOUSANDS/UL (ref 149–390)
PMV BLD AUTO: 9.5 FL (ref 8.9–12.7)
POTASSIUM SERPL-SCNC: 4.3 MMOL/L (ref 3.5–5.3)
PROT SERPL-MCNC: 7.4 G/DL (ref 6.4–8.4)
RBC # BLD AUTO: 2.63 MILLION/UL (ref 3.81–5.12)
SODIUM SERPL-SCNC: 134 MMOL/L (ref 135–147)
WBC # BLD AUTO: 1.9 THOUSAND/UL (ref 4.31–10.16)

## 2025-07-16 PROCEDURE — 85025 COMPLETE CBC W/AUTO DIFF WBC: CPT | Performed by: INTERNAL MEDICINE

## 2025-07-16 PROCEDURE — 80053 COMPREHEN METABOLIC PANEL: CPT | Performed by: INTERNAL MEDICINE

## 2025-07-16 PROCEDURE — 96374 THER/PROPH/DIAG INJ IV PUSH: CPT

## 2025-07-16 PROCEDURE — 83735 ASSAY OF MAGNESIUM: CPT | Performed by: INTERNAL MEDICINE

## 2025-07-16 PROCEDURE — 36593 DECLOT VASCULAR DEVICE: CPT

## 2025-07-16 PROCEDURE — 99213 OFFICE O/P EST LOW 20 MIN: CPT | Performed by: PHYSICIAN ASSISTANT

## 2025-07-16 RX ADMIN — ONDANSETRON 8 MG: 2 INJECTION INTRAMUSCULAR; INTRAVENOUS at 14:05

## 2025-07-16 RX ADMIN — SODIUM CHLORIDE 1000 ML: 0.9 INJECTION, SOLUTION INTRAVENOUS at 13:48

## 2025-07-16 RX ADMIN — ALTEPLASE 2 MG: 2.2 INJECTION, POWDER, LYOPHILIZED, FOR SOLUTION INTRAVENOUS at 12:59

## 2025-07-16 NOTE — PROGRESS NOTES
Patient presents for hydration and labs. Port without blood return. CathFlo instilled. Peripheral IV started and labs collected. Patient tolerated hydration without incident. AVS printed and given to patient. Patient is aware of next appointment on 7/21/25 at 12:30AM.

## 2025-07-16 NOTE — ASSESSMENT & PLAN NOTE
- She feels well and the gastrostomy tube is functioning well  -She offers no complaints and had no further questions  -We can proceed with any orders that she would require from a nutritional or PEG tube standpoint

## 2025-07-16 NOTE — PROGRESS NOTES
Port accessed without blood return. After nursing interventions, no blood return. CathFlo instilled, after 2 hours, blood return noted.

## 2025-07-16 NOTE — PROGRESS NOTES
Name: Maira Moura      : 1955      MRN: [id number]  Encounter Provider: Jorje Mckeon PA-C  Encounter Date: 2025   Encounter department: [facility]  :  Assessment & Plan  Gastrostomy tube in situ (HCC)  - She feels well and the gastrostomy tube is functioning well  -She offers no complaints and had no further questions  -We can proceed with any orders that she would require from a nutritional or PEG tube standpoint       Right tonsillar squamous cell carcinoma (HCC)             Administrative Statements   Encounter provider Jorje Mckeon PA-C    The Patient is located at Home and in the following state in which I hold an active license [place].    The patient was identified by name and date of birth. Maira Moura was informed that this is a telemedicine visit and that the visit is being conducted through the Epic Embedded platform. She agrees to proceed..  My office door was closed. No one else was in the room.  She acknowledged consent and understanding of privacy and security of the video platform. The patient has agreed to participate and understands they can discontinue the visit at any time.    I have spent a total time of 20 minutes in caring for this patient on the day of the visit/encounter including Instructions for management, Counseling / Coordination of care, Documenting in the medical record, Reviewing/placing orders in the medical record (including tests, medications, and/or procedures), Obtaining or reviewing history  , and Communicating with other healthcare professionals , not including the time spent for establishing the audio/video connection.     History of Present Illness   Maira Moura is a 70 y.o. female new to me with past medical history of right tonsillar squamous cell carcinoma and subsequent gastrostomy tube who presents in video visit so we can establish care for her and order her tube feed  materials.    Thankfully she is feeling well and offers no complaints.  She states that the PEG tube is functioning well.  She forgets when the original tube was placed but believes it has been replaced 1 time.    HPI  History obtained from: patient  Review of Systems A complete review of systems is negative other than that noted above in the HPI.    Past Medical History   Past Medical History[1]  Past Surgical History[2]  Family History[3]   reports that she has never smoked. She has never been exposed to tobacco smoke. She has never used smokeless tobacco. She reports that she does not drink alcohol and does not use drugs.  Current Outpatient Medications   Medication Instructions   • acetaminophen (TYLENOL) 160 mg/5 mL liquid Take 20 mL (640 mg total) by mouth every 6 (six) hours as needed for mild pain for up to 10 days   • anastrozole (ARIMIDEX) 1 mg, Oral, Daily   • atorvastatin (LIPITOR) 40 mg, Oral, Daily at bedtime   • cholestyramine (QUESTRAN) 4 g, Oral, 3 times daily with meals   • diphenhydramine, lidocaine, Al/Mg hydroxide, simethicone (Magic Mouthwash) SUSP 10 mL, Swish & Swallow, Every 4 hours PRN   • docusate sodium (COLACE) 50 mg, Oral, 2 times daily   • dronabinol (MARINOL) 2.5 mg, Oral, Daily   • ergocalciferol (VITAMIN D2) 50,000 Units, Oral, Weekly   • folic acid (FOLVITE) 1 mg, Oral, Daily   • gabapentin (NEURONTIN) 300 mg capsule Take 1 pills in the morning, 1 pill at lunch and 2 pills before bed   • hydrocortisone 1 % ointment    • ibuprofen (MOTRIN) 400 mg, Oral, Every 6 hours PRN   • lidocaine-prilocaine (EMLA) cream Topical, As needed   • losartan (COZAAR) 50 mg, Oral, Daily   • magnesium Oxide (MAG-OX) 400 mg, Per G Tube, 2 times daily   • mirtazapine (REMERON) 30 mg, Oral, Daily at bedtime   • mirtazapine (REMERON) 15 mg, Oral, Daily at bedtime, Patient is also on a 30 mg tablet, for a total dose of 45 mg   • nystatin (MYCOSTATIN) 500,000 Units, Mouth/Throat, 4 times daily   • omeprazole  "(PRILOSEC) 20 mg, Oral, Daily   • ondansetron (ZOFRAN) 8 mg, Oral, Every 8 hours PRN   • oxyCODONE (ROXICODONE) 5 mg, Oral, Every 6 hours PRN   • potassium chloride (Klor-Con M20) 20 mEq tablet 20 mEq, Oral, 2 times daily   • potassium chloride 10% oral solution 20 mEq, Per G Tube, 2 times daily   • prochlorperazine (COMPAZINE) 10 mg, Oral, Every 6 hours PRN   • pyridoxine (VITAMIN B6) 50 mg, Oral, Daily   • Trifluridine-Tipiracil 20-8.19 MG TABS 3 tablets, Oral, 2 times daily, Days 1-5 and 8-12 every 28 days   • vitamin B-12 (VITAMIN B-12) 1,000 mcg tablet    Allergies[4]   Medications Ordered Prior to Encounter[5]   Social History[6]  Current Medications[7]  Objective   Ht 5' 5\" (1.651 m)   Wt 68.9 kg (152 lb)   BMI 25.29 kg/m²     Physical Exam  Constitutional:       Appearance: Normal appearance.   HENT:      Head: Normocephalic and atraumatic.      Mouth/Throat:      Comments: Poor dentition    Eyes:      Conjunctiva/sclera: Conjunctivae normal.     Pulmonary:      Effort: No respiratory distress.     Skin:     Coloration: Skin is not jaundiced.     Neurological:      Mental Status: She is alert and oriented to person, place, and time.     Psychiatric:         Mood and Affect: Mood normal.         Behavior: Behavior normal.            Lab Results: I personally reviewed relevant lab results.               Jorje Mckeon PA-C  [facility] - Gastroentrology           [1]  Past Medical History:  Diagnosis Date   • Anemia    • Arthritis    • Body mass index (BMI) 40.0-44.9, adult (HCC) 9/22/2023   • Breast cancer (HCC) 12/17/2018   • Cancer (HCC)     right breast, colon, liver, right tonsil   • Cervical lymphadenopathy 3/21/2023    CT neck on 3/30/2023- Large right level 2A lymphadenopathy as described above and suspicious for neoplasm.  Correlation with histopathology is recommended.  Mild asymmetric prominence of the right palatine tonsil with otherwise no definitive nodular enhancing " lesions identified along the course of the aerodigestive tract.     5/26/23- FNA of this node was nonrevealing for tissue etiology, but it d   • Encounter for screening for HIV 07/07/2022   • Follow-up examination 04/04/2023   • GERD (gastroesophageal reflux disease)    • Hyperlipidemia    • Hypertension    • Obesity    • Stroke (HCC)     TIA    • Stroke (HCC)     TIA    • Transaminitis 09/22/2021   • Vasomotor rhinitis 8/9/2018    Refilled flonase today. Stopped taking since January 2022   [2]  Past Surgical History:  Procedure Laterality Date   • BREAST BIOPSY Right 11/23/2018    us guided bx cancer   • BREAST SURGERY     • COLONOSCOPY  07/06/2023   • ESOPHAGOSCOPY N/A 07/20/2023    Procedure: ESOPHAGOSCOPY;  Surgeon: David Chapa MD;  [facility];  Service: ENT   • HEMICOLOECTOMY W/ ANASTOMOSIS Right 03/12/2024    Procedure: RIGHT COLECTOMY WITH ROBOTICS;  Surgeon: Vickie Israel MD;  [facility];  Service: Surgical Oncology   • IR BIOPSY LIVER MASS  06/27/2023   • IR CRYOABLATION  06/03/2024   • IR GASTROSTOMY (G) TUBE CHECK/CHANGE/REINSERTION/UPSIZE  04/09/2025   • IR GASTROSTOMY TUBE PLACEMENT  10/02/2024   • IR PORT CHECK  01/03/2024   • IR PORT PLACEMENT  07/28/2023   • IR PORT STRIPPING  01/09/2024   • LAPAROTOMY N/A 03/12/2024    Procedure: LAPAROTOMY EXPLORATORY W/ ROBOTICS;  Surgeon: Vickie Israel MD;  [facility];  Service: Surgical Oncology   • NJ BIOPSY NASOPHARYNX VISIBLE LESION SIMPLE N/A 10/09/2024    Procedure: NASOPHARYNGOSCOPY with biospy;  Surgeon: Juanito Matthew MD;  [facility];  Service: ENT   • NJ BRNCHSC INCL FLUOR GDNCE DX W/CELL WASHG SPX N/A 07/20/2023    Procedure: BRONCHOSCOPY;  Surgeon: David Chapa MD;  [facility];  Service: ENT   • NJ LARYNGOSCOPY W/BIOPSY MICROSCOPE/TELESCOPE N/A 07/20/2023    Procedure: MICRODIRECT LARYNGOSCOPY WITH BIOPSY;  Surgeon: David Chapa MD;  [facility];  Service: ENT   • NJ  LARYNGOSCOPY W/WO TRACHEOSCOPY DX EXCEPT  N/A 10/09/2024    Procedure: DIRECT LARYNGOSCOPY;  Surgeon: Juanito Matthew MD;  [facility];  Service: ENT   • NY MASTECTOMY PARTIAL W/AXILLARY LYMPHADENECTOMY Right 2018    Procedure: ULTRASOUND LOCALIZED PARTIAL MASTECTOMY W/SENTINEL NODE BIOPSY POSS AXILLARY DISSECTION;  Surgeon: Zahida Coronado MD;  Location: [facility];  Service: General   • NY TONSILLECTOMY PRIMARY/SECONDARY AGE 12/> N/A 10/09/2024    Procedure: TONSILLECTOMY;  Surgeon: Juanito Matthew MD;  [facility];  Service: ENT   • TUBAL LIGATION     • US GUIDED BREAST BIOPSY RIGHT COMPLETE Right 2018   • US GUIDED INJECTION FOR RESEARCH STUDY  2018   • US GUIDED INJECTION FOR RESEARCH STUDY  2018   • US GUIDED INJECTION FOR RESEARCH STUDY  2019   • US GUIDED LYMPH NODE BIOPSY RIGHT  2023   [3]  Family History  Problem Relation Name Age of Onset   • Colon cancer Mother  58   • Hypertension Mother     • Pancreatic cancer Father  60   • Hypertension Daughter     • Hypertension Son     [4]  No Known Allergies[5]  Current Outpatient Medications on File Prior to Visit   Medication Sig Dispense Refill   • acetaminophen (TYLENOL) 160 mg/5 mL liquid Take 20 mL (640 mg total) by mouth every 6 (six) hours as needed for mild pain for up to 10 days (Patient taking differently: Take 640 mg by mouth if needed for moderate pain or headaches Take 20 mL (640 mg total) by mouth every 6 (six) hours as needed for mild pain for up to 10 days) 473 mL 3   • anastrozole (ARIMIDEX) 1 mg tablet Take 1 tablet (1 mg total) by mouth daily 90 tablet 0   • atorvastatin (LIPITOR) 40 mg tablet Take 1 tablet (40 mg total) by mouth daily at bedtime 90 tablet 3   • docusate sodium (COLACE) 50 mg capsule Take 1 capsule (50 mg total) by mouth 2 (two) times a day 90 capsule 1   • dronabinol (MARINOL) 2.5 mg capsule Take 1 capsule (2.5 mg total) by mouth in the morning 5 capsule 0    • ergocalciferol (VITAMIN D2) 50,000 units Take 1 capsule (50,000 Units total) by mouth once a week 90 capsule 0   • folic acid (FOLVITE) 1 mg tablet Take 1 tablet (1 mg total) by mouth in the morning 90 tablet 3   • gabapentin (NEURONTIN) 300 mg capsule Take 1 pills in the morning, 1 pill at lunch and 2 pills before bed 120 capsule 5   • losartan (COZAAR) 50 mg tablet Take 1 tablet (50 mg total) by mouth daily 90 tablet 2   • mirtazapine (REMERON) 15 mg tablet Take 1 tablet (15 mg total) by mouth daily at bedtime Patient is also on a 30 mg tablet, for a total dose of 45 mg 30 tablet 0   • mirtazapine (REMERON) 30 mg tablet Take 1 tablet (30 mg total) by mouth daily at bedtime 30 tablet 0   • nystatin (MYCOSTATIN) 500,000 units/5 mL suspension Apply 5 mL (500,000 Units total) to the mouth or throat 4 (four) times a day 600 mL 3   • omeprazole (PriLOSEC) 20 mg delayed release capsule Take 1 capsule (20 mg total) by mouth daily 30 capsule 3   • ondansetron (ZOFRAN) 8 mg tablet Take 1 tablet (8 mg total) by mouth every 8 (eight) hours as needed for nausea or vomiting 90 tablet 2   • oxyCODONE (ROXICODONE) 5 mg/5 mL solution Take 5 mL (5 mg total) by mouth every 6 (six) hours as needed for severe pain ((breakthrough pain)) Max Daily Amount: 20 mg 473 mL 0   • prochlorperazine (COMPAZINE) 10 mg tablet Take 1 tablet (10 mg total) by mouth every 6 (six) hours as needed for nausea or vomiting 90 tablet 2   • pyridoxine (VITAMIN B6) 50 mg tablet Take 1 tablet (50 mg total) by mouth daily 90 tablet 3   • Trifluridine-Tipiracil 20-8.19 MG TABS Take 3 tablets by mouth 2 (two) times a day Days 1-5 and 8-12 every 28 days 60 tablet 5   • cholestyramine (QUESTRAN) 4 g packet Take 1 packet (4 g total) by mouth 3 (three) times a day with meals (Patient not taking: Reported on 7/16/2025) 90 packet 3   • diphenhydramine, lidocaine, Al/Mg hydroxide, simethicone (Magic Mouthwash) SUSP Swish and swallow 10 mL every 4 (four) hours as  needed for mouth pain or discomfort (Patient not taking: Reported on 7/16/2025) 480 mL 2   • hydrocortisone 1 % ointment  (Patient not taking: Reported on 12/20/2024)     • ibuprofen (MOTRIN) 100 mg/5 mL suspension Take 20 mL (400 mg total) by mouth every 6 (six) hours as needed for moderate pain (Patient not taking: Reported on 1/31/2025) 473 mL 0   • lidocaine-prilocaine (EMLA) cream Apply topically as needed for mild pain (Patient not taking: Reported on 12/6/2024) 30 g 3   • magnesium Oxide (MAG-OX) 400 mg TABS 1 tablet (400 mg total) by Per G Tube route 2 (two) times a day (Patient not taking: Reported on 1/31/2025) 60 tablet 0   • potassium chloride (Klor-Con M20) 20 mEq tablet Take 1 tablet (20 mEq total) by mouth 2 (two) times a day (Patient not taking: Reported on 1/31/2025) 90 tablet 1   • potassium chloride 10% oral solution 15 mL (20 mEq total) by Per G Tube route 2 (two) times a day (Patient not taking: Reported on 7/16/2025) 473 mL 0   • vitamin B-12 (VITAMIN B-12) 1,000 mcg tablet  (Patient not taking: Reported on 6/18/2025)       Current Facility-Administered Medications on File Prior to Visit   Medication Dose Route Frequency Provider Last Rate Last Admin   • alteplase (CATHFLO) injection 2 mg  2 mg Intracatheter Q1MIN PRN Harika Agostino, DO       • alteplase (CATHFLO) injection 2 mg  2 mg Intracatheter Q1MIN PRN Harika Agostino, DO   2 mg at 07/16/25 1259   • [COMPLETED] ondansetron (ZOFRAN) 8 mg in sodium chloride 0.9 % 50 mL IVPB  8 mg Intravenous Once Harika Agostino, DO   Stopped at 07/16/25 1420   • [COMPLETED] sodium chloride 0.9 % bolus 1,000 mL  1,000 mL Intravenous Once Harika Agostino, DO   Stopped at 07/16/25 1448   [6]  Social History  Tobacco Use   • Smoking status: Never     Passive exposure: Never   • Smokeless tobacco: Never   • Tobacco comments:     NO TOBACCO USE   Vaping Use   • Vaping status: Never Used   Substance and Sexual Activity   • Alcohol use: Never   • Drug use: Never    [7]  Current Outpatient Medications   Medication Sig Dispense Refill   • acetaminophen (TYLENOL) 160 mg/5 mL liquid Take 20 mL (640 mg total) by mouth every 6 (six) hours as needed for mild pain for up to 10 days (Patient taking differently: Take 640 mg by mouth if needed for moderate pain or headaches Take 20 mL (640 mg total) by mouth every 6 (six) hours as needed for mild pain for up to 10 days) 473 mL 3   • anastrozole (ARIMIDEX) 1 mg tablet Take 1 tablet (1 mg total) by mouth daily 90 tablet 0   • atorvastatin (LIPITOR) 40 mg tablet Take 1 tablet (40 mg total) by mouth daily at bedtime 90 tablet 3   • docusate sodium (COLACE) 50 mg capsule Take 1 capsule (50 mg total) by mouth 2 (two) times a day 90 capsule 1   • dronabinol (MARINOL) 2.5 mg capsule Take 1 capsule (2.5 mg total) by mouth in the morning 5 capsule 0   • ergocalciferol (VITAMIN D2) 50,000 units Take 1 capsule (50,000 Units total) by mouth once a week 90 capsule 0   • folic acid (FOLVITE) 1 mg tablet Take 1 tablet (1 mg total) by mouth in the morning 90 tablet 3   • gabapentin (NEURONTIN) 300 mg capsule Take 1 pills in the morning, 1 pill at lunch and 2 pills before bed 120 capsule 5   • losartan (COZAAR) 50 mg tablet Take 1 tablet (50 mg total) by mouth daily 90 tablet 2   • mirtazapine (REMERON) 15 mg tablet Take 1 tablet (15 mg total) by mouth daily at bedtime Patient is also on a 30 mg tablet, for a total dose of 45 mg 30 tablet 0   • mirtazapine (REMERON) 30 mg tablet Take 1 tablet (30 mg total) by mouth daily at bedtime 30 tablet 0   • nystatin (MYCOSTATIN) 500,000 units/5 mL suspension Apply 5 mL (500,000 Units total) to the mouth or throat 4 (four) times a day 600 mL 3   • omeprazole (PriLOSEC) 20 mg delayed release capsule Take 1 capsule (20 mg total) by mouth daily 30 capsule 3   • ondansetron (ZOFRAN) 8 mg tablet Take 1 tablet (8 mg total) by mouth every 8 (eight) hours as needed for nausea or vomiting 90 tablet 2   • oxyCODONE  (ROXICODONE) 5 mg/5 mL solution Take 5 mL (5 mg total) by mouth every 6 (six) hours as needed for severe pain ((breakthrough pain)) Max Daily Amount: 20 mg 473 mL 0   • prochlorperazine (COMPAZINE) 10 mg tablet Take 1 tablet (10 mg total) by mouth every 6 (six) hours as needed for nausea or vomiting 90 tablet 2   • pyridoxine (VITAMIN B6) 50 mg tablet Take 1 tablet (50 mg total) by mouth daily 90 tablet 3   • Trifluridine-Tipiracil 20-8.19 MG TABS Take 3 tablets by mouth 2 (two) times a day Days 1-5 and 8-12 every 28 days 60 tablet 5   • cholestyramine (QUESTRAN) 4 g packet Take 1 packet (4 g total) by mouth 3 (three) times a day with meals (Patient not taking: Reported on 7/16/2025) 90 packet 3   • diphenhydramine, lidocaine, Al/Mg hydroxide, simethicone (Magic Mouthwash) SUSP Swish and swallow 10 mL every 4 (four) hours as needed for mouth pain or discomfort (Patient not taking: Reported on 7/16/2025) 480 mL 2   • hydrocortisone 1 % ointment  (Patient not taking: Reported on 12/20/2024)     • ibuprofen (MOTRIN) 100 mg/5 mL suspension Take 20 mL (400 mg total) by mouth every 6 (six) hours as needed for moderate pain (Patient not taking: Reported on 1/31/2025) 473 mL 0   • lidocaine-prilocaine (EMLA) cream Apply topically as needed for mild pain (Patient not taking: Reported on 12/6/2024) 30 g 3   • magnesium Oxide (MAG-OX) 400 mg TABS 1 tablet (400 mg total) by Per G Tube route 2 (two) times a day (Patient not taking: Reported on 1/31/2025) 60 tablet 0   • potassium chloride (Klor-Con M20) 20 mEq tablet Take 1 tablet (20 mEq total) by mouth 2 (two) times a day (Patient not taking: Reported on 1/31/2025) 90 tablet 1   • potassium chloride 10% oral solution 15 mL (20 mEq total) by Per G Tube route 2 (two) times a day (Patient not taking: Reported on 7/16/2025) 473 mL 0   • vitamin B-12 (VITAMIN B-12) 1,000 mcg tablet  (Patient not taking: Reported on 6/18/2025)       No current facility-administered medications for  this visit.     Facility-Administered Medications Ordered in Other Visits   Medication Dose Route Frequency Provider Last Rate Last Admin   • alteplase (CATHFLO) injection 2 mg  2 mg Intracatheter Q1MIN PRN Harika Agostino, DO       • alteplase (CATHFLO) injection 2 mg  2 mg Intracatheter Q1MIN PRN Harika Agostino, DO   2 mg at 07/16/25 1531

## 2025-07-17 ENCOUNTER — OFFICE VISIT (OUTPATIENT)
Dept: SPEECH THERAPY | Facility: CLINIC | Age: 70
End: 2025-07-17
Attending: INTERNAL MEDICINE
Payer: MEDICARE

## 2025-07-17 DIAGNOSIS — Z90.89 STATUS POST TONSILLECTOMY: ICD-10-CM

## 2025-07-17 DIAGNOSIS — C09.9 RIGHT TONSILLAR SQUAMOUS CELL CARCINOMA (HCC): ICD-10-CM

## 2025-07-17 DIAGNOSIS — R13.12 DYSPHAGIA, OROPHARYNGEAL PHASE: Primary | ICD-10-CM

## 2025-07-17 PROCEDURE — 92526 ORAL FUNCTION THERAPY: CPT | Performed by: SPEECH-LANGUAGE PATHOLOGIST

## 2025-07-17 NOTE — PROGRESS NOTES
Daily Speech Treatment Note    Today's date: 2025  Patient’s name: Maira Moura  : 1955  MRN: [id number]  Safety measures: HTN, CVA, GERD, active CA, s/p PEG tube placement, aspiration precautions  Current recommended diet: PEG tube for primary nutrition/hydration; mech soft/moist with thin liquids P.O. as tolerated   Referring provider: Harika Medina DO Encounter Diagnosis     ICD-10-CM    1. Dysphagia, oropharyngeal phase  R13.12       2. Status post tonsillectomy  Z90.89       3. 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         Visit Tracking:  POC   Expires Auth Expiration Date ST Visit Limit   2025 or 10th visit 2025 BOMN              Visit/Unit Tracking:  Auth Status Date 07/03/25 07/10/25 07/17/25          Not required Used 1 2 3            Remaining 9 8 7            Subjective/Behavioral:  -Patient reported that she wasn't feeling well on Tuesday and had to cancel her appointment.    Objective/Assessment:    Short-term goals: (: 2025)  Patient will tolerate P.O. trials of her recommended diet with the implementation of safe swallowing strategies without overt s/sx of penetration and/or aspiration during skilled therapy sessions in this certification period. -- PARTIALLY MET/ONGOING    Traditional VitalStim     Channel(s) used: 1, 2   Placement: 2b   Duty Cycle: 57/1 s   Frequency: 80 pulses per second   Phase Duration: 300 microseconds   Treatment Duration: 37 minutes   Min mA level: 5.0 mA   Max mA level: 10.0 mA      Patient tolerated NMES in conjunction with pharyngeal/laryngeal strengthening exercises and P.O. intake. (The patient received instruction on the potential benefits from NMES treatment, including neuromuscular re-education and muscle strengthening.) Skin condition post-NMES: intact.      Patient consumed the following during today’s session: chocolate pudding and water (thin liquid) via side of cup -- NMES remained on during  pharyngeal strengthening exercises (see below).     Patient consumed pudding at a steady rate today, which is an improvement.     Patient did not want to trial any other food consistency today.    Oral containment, mastication, bolus formation/control, AP transfer, and swallow initiation were judged to be WFL. Reduced hyolaryngeal elevation and excursion noted upon palpation. No oral cavity residue was observed. Patient without any c/o globus sensation. No overt s/sx of penetration/aspiration noted during today's session, which is an improvement.     Patient will independently complete pharyngeal strengthening exercises (e.g., Effortful swallow, Mendelsohn, Evelia) daily to facilitate increased pharyngeal strength and coordination. -- PARTIALLY MET/ONGOING   -Patient completed the following pharyngeal strengthening exercises during today's session:  *Effortful swallow: 2 sets of 10 reps  *Evelia: 2 sets of 10 reps  *Mendelsohn: 2 sets of 10 reps    Plan:  -Patient was provided with home exercises/activities to target goals in plan of care at the end of today's session.  -Continue with current plan of care.

## 2025-07-18 NOTE — TELEPHONE ENCOUNTER
Spoke with [employer] order is ready to ship once pt calls to give the go ahead.  I called and spoke with the pt and verbalized understanding that she needed to call [employer] and give them the okay to ship the order. She stated she has [employer] phone number and will call them.

## 2025-07-21 ENCOUNTER — HOSPITAL ENCOUNTER (EMERGENCY)
Facility: HOSPITAL | Age: 70
Discharge: HOME | End: 2025-07-21
Attending: EMERGENCY MEDICINE | Admitting: EMERGENCY MEDICINE
Payer: MEDICARE

## 2025-07-21 ENCOUNTER — APPOINTMENT (EMERGENCY)
Dept: CT IMAGING | Facility: HOSPITAL | Age: 70
End: 2025-07-21
Payer: MEDICARE

## 2025-07-21 VITALS
TEMPERATURE: 97.7 F | DIASTOLIC BLOOD PRESSURE: 74 MMHG | RESPIRATION RATE: 16 BRPM | BODY MASS INDEX: 26.19 KG/M2 | SYSTOLIC BLOOD PRESSURE: 168 MMHG | HEART RATE: 82 BPM | OXYGEN SATURATION: 100 % | WEIGHT: 157.41 LBS

## 2025-07-21 DIAGNOSIS — R55 SYNCOPE: Primary | ICD-10-CM

## 2025-07-21 DIAGNOSIS — T82.9XXA: ICD-10-CM

## 2025-07-21 LAB
2HR DELTA HS TROPONIN: 0 NG/L
ALBUMIN SERPL BCG-MCNC: 3.1 G/DL (ref 3.5–5)
ALP SERPL-CCNC: 81 U/L (ref 34–104)
ALT SERPL W P-5'-P-CCNC: 6 U/L (ref 7–52)
ANION GAP SERPL CALCULATED.3IONS-SCNC: 6 MMOL/L (ref 4–13)
AST SERPL W P-5'-P-CCNC: 14 U/L (ref 13–39)
ATRIAL RATE: 86 BPM
ATRIAL RATE: 90 BPM
BACTERIA UR QL AUTO: NORMAL /HPF
BASOPHILS # BLD AUTO: 0.02 THOUSANDS/ÂΜL (ref 0–0.1)
BASOPHILS NFR BLD AUTO: 1 % (ref 0–1)
BILIRUB SERPL-MCNC: 0.31 MG/DL (ref 0.2–1)
BILIRUB UR QL STRIP: NEGATIVE
BUN SERPL-MCNC: 31 MG/DL (ref 5–25)
CALCIUM ALBUM COR SERPL-MCNC: 9.5 MG/DL (ref 8.3–10.1)
CALCIUM SERPL-MCNC: 8.8 MG/DL (ref 8.4–10.2)
CARDIAC TROPONIN I PNL SERPL HS: 3 NG/L (ref ?–50)
CARDIAC TROPONIN I PNL SERPL HS: 3 NG/L (ref ?–50)
CHLORIDE SERPL-SCNC: 100 MMOL/L (ref 96–108)
CLARITY UR: CLEAR
CO2 SERPL-SCNC: 27 MMOL/L (ref 21–32)
COLOR UR: YELLOW
CREAT SERPL-MCNC: 0.88 MG/DL (ref 0.6–1.3)
EOSINOPHIL # BLD AUTO: 0.01 THOUSAND/ÂΜL (ref 0–0.61)
EOSINOPHIL NFR BLD AUTO: 0 % (ref 0–6)
ERYTHROCYTE [DISTWIDTH] IN BLOOD BY AUTOMATED COUNT: 13.9 % (ref 11.6–15.1)
GFR SERPL CREATININE-BSD FRML MDRD: 67 ML/MIN/1.73SQ M
GLUCOSE SERPL-MCNC: 102 MG/DL (ref 65–140)
GLUCOSE UR STRIP-MCNC: NEGATIVE MG/DL
HCT VFR BLD AUTO: 27.9 % (ref 34.8–46.1)
HGB BLD-MCNC: 9.2 G/DL (ref 11.5–15.4)
HGB UR QL STRIP.AUTO: ABNORMAL
IMM GRANULOCYTES # BLD AUTO: 0.01 THOUSAND/UL (ref 0–0.2)
IMM GRANULOCYTES NFR BLD AUTO: 0 % (ref 0–2)
KETONES UR STRIP-MCNC: NEGATIVE MG/DL
LEUKOCYTE ESTERASE UR QL STRIP: NEGATIVE
LYMPHOCYTES # BLD AUTO: 0.46 THOUSANDS/ÂΜL (ref 0.6–4.47)
LYMPHOCYTES NFR BLD AUTO: 19 % (ref 14–44)
MAGNESIUM SERPL-MCNC: 1.9 MG/DL (ref 1.9–2.7)
MCH RBC QN AUTO: 30.6 PG (ref 26.8–34.3)
MCHC RBC AUTO-ENTMCNC: 33 G/DL (ref 31.4–37.4)
MCV RBC AUTO: 93 FL (ref 82–98)
MONOCYTES # BLD AUTO: 0.12 THOUSAND/ÂΜL (ref 0.17–1.22)
MONOCYTES NFR BLD AUTO: 5 % (ref 4–12)
NEUTROPHILS # BLD AUTO: 1.81 THOUSANDS/ÂΜL (ref 1.85–7.62)
NEUTS SEG NFR BLD AUTO: 75 % (ref 43–75)
NITRITE UR QL STRIP: NEGATIVE
NON-SQ EPI CELLS URNS QL MICRO: NORMAL /HPF
NRBC BLD AUTO-RTO: 0 /100 WBCS
P AXIS: 67 DEGREES
P AXIS: 76 DEGREES
PH UR STRIP.AUTO: 7 [PH] (ref 4.5–8)
PLATELET # BLD AUTO: 191 THOUSANDS/UL (ref 149–390)
PMV BLD AUTO: 9 FL (ref 8.9–12.7)
POTASSIUM SERPL-SCNC: 3.8 MMOL/L (ref 3.5–5.3)
PR INTERVAL: 156 MS
PR INTERVAL: 166 MS
PROT SERPL-MCNC: 6.6 G/DL (ref 6.4–8.4)
PROT UR STRIP-MCNC: NEGATIVE MG/DL
QRS AXIS: 62 DEGREES
QRS AXIS: 65 DEGREES
QRSD INTERVAL: 74 MS
QRSD INTERVAL: 76 MS
QT INTERVAL: 338 MS
QT INTERVAL: 356 MS
QTC INTERVAL: 413 MS
QTC INTERVAL: 426 MS
RBC # BLD AUTO: 3.01 MILLION/UL (ref 3.81–5.12)
RBC #/AREA URNS AUTO: NORMAL /HPF
SODIUM SERPL-SCNC: 133 MMOL/L (ref 135–147)
SP GR UR STRIP.AUTO: 1.01 (ref 1–1.03)
T WAVE AXIS: 57 DEGREES
T WAVE AXIS: 61 DEGREES
T4 FREE SERPL-MCNC: 0.79 NG/DL (ref 0.61–1.12)
TSH SERPL DL<=0.05 MIU/L-ACNC: 5.82 UIU/ML (ref 0.45–4.5)
UROBILINOGEN UR QL STRIP.AUTO: 0.2 E.U./DL
VENTRICULAR RATE: 86 BPM
VENTRICULAR RATE: 90 BPM
WBC # BLD AUTO: 2.43 THOUSAND/UL (ref 4.31–10.16)
WBC #/AREA URNS AUTO: NORMAL /HPF

## 2025-07-21 PROCEDURE — 83735 ASSAY OF MAGNESIUM: CPT | Performed by: PHYSICIAN ASSISTANT

## 2025-07-21 PROCEDURE — 99284 EMERGENCY DEPT VISIT MOD MDM: CPT

## 2025-07-21 PROCEDURE — 84439 ASSAY OF FREE THYROXINE: CPT | Performed by: PHYSICIAN ASSISTANT

## 2025-07-21 PROCEDURE — 74177 CT ABD & PELVIS W/CONTRAST: CPT

## 2025-07-21 PROCEDURE — 96360 HYDRATION IV INFUSION INIT: CPT

## 2025-07-21 PROCEDURE — 84443 ASSAY THYROID STIM HORMONE: CPT | Performed by: PHYSICIAN ASSISTANT

## 2025-07-21 PROCEDURE — 72125 CT NECK SPINE W/O DYE: CPT

## 2025-07-21 PROCEDURE — 85025 COMPLETE CBC W/AUTO DIFF WBC: CPT | Performed by: PHYSICIAN ASSISTANT

## 2025-07-21 PROCEDURE — 93010 ELECTROCARDIOGRAM REPORT: CPT | Performed by: INTERNAL MEDICINE

## 2025-07-21 PROCEDURE — 93005 ELECTROCARDIOGRAM TRACING: CPT

## 2025-07-21 PROCEDURE — 99285 EMERGENCY DEPT VISIT HI MDM: CPT | Performed by: PHYSICIAN ASSISTANT

## 2025-07-21 PROCEDURE — 84484 ASSAY OF TROPONIN QUANT: CPT | Performed by: PHYSICIAN ASSISTANT

## 2025-07-21 PROCEDURE — 80053 COMPREHEN METABOLIC PANEL: CPT | Performed by: PHYSICIAN ASSISTANT

## 2025-07-21 PROCEDURE — 81001 URINALYSIS AUTO W/SCOPE: CPT

## 2025-07-21 PROCEDURE — 70450 CT HEAD/BRAIN W/O DYE: CPT

## 2025-07-21 PROCEDURE — 36415 COLL VENOUS BLD VENIPUNCTURE: CPT | Performed by: PHYSICIAN ASSISTANT

## 2025-07-21 PROCEDURE — 71260 CT THORAX DX C+: CPT

## 2025-07-21 RX ADMIN — ALTEPLASE 2 MG: 2.2 INJECTION, POWDER, LYOPHILIZED, FOR SOLUTION INTRAVENOUS at 11:07

## 2025-07-21 RX ADMIN — SODIUM CHLORIDE 500 ML: 0.9 INJECTION, SOLUTION INTRAVENOUS at 10:25

## 2025-07-21 RX ADMIN — IOHEXOL 100 ML: 350 INJECTION, SOLUTION INTRAVENOUS at 11:53

## 2025-07-21 NOTE — ED PROVIDER NOTES
Time reflects when diagnosis was documented in both MDM as applicable and the Disposition within this note       Time User Action Codes Description Comment    7/21/2025  3:23 PM Sd Mcwilliams Add [R55] Syncope           ED Disposition       ED Disposition   Discharge    Condition   Stable    Date/Time   Mon Jul 21, 2025  3:23 PM    Comment   Maira Moura discharge to home/self care.                   Assessment & Plan       Medical Decision Making  Syncopal episode occurring shortly prior to arrival from standing, reported preceding lightheadedness.  She does note over the past couple days she has been fatigued.  Vitals within normal limits on arrival.  No apparent acute traumatic findings on exam.  EKGs show normal sinus rhythm, no acute ischemic changes or ectopy.  Trops negative x 2.  CT of head, C-spine, chest/Abdo/pelvis without acute traumatic findings.  Labs are largely at baseline.  Probable progression of metastatic disease noted.  IV fluids provided here.  Patient was able to ambulate here with a steady gait.  Discussed admission to the hospital for continued observation/telemetry, however the patient would prefer discharge.  Advised adequate hydration at home.  Strict return to ED indications reviewed.    Amount and/or Complexity of Data Reviewed  Labs: ordered.  Radiology: ordered.    Risk  Prescription drug management.             Medications   sodium chloride 0.9 % bolus 500 mL (0 mL Intravenous Stopped 7/21/25 1140)   alteplase (CATHFLO) injection 2 mg (2 mg Intracatheter Given 7/21/25 1107)   iohexol (OMNIPAQUE) 350 MG/ML injection (MULTI-DOSE) 100 mL (100 mL Intravenous Given 7/21/25 1153)       ED Risk Strat Scores                    No data recorded        SBIRT 22yo+      Flowsheet Row Most Recent Value   Initial Alcohol Screen: US AUDIT-C     1. How often do you have a drink containing alcohol? 0 Filed at: 07/21/2025 1003   2. How many drinks containing alcohol do you have on a typical  day you are drinking?  0 Filed at: 07/21/2025 1003   3b. FEMALE Any Age, or MALE 65+: How often do you have 4 or more drinks on one occassion? 0 Filed at: 07/21/2025 1003   Audit-C Score 0 Filed at: 07/21/2025 1003   TOI: How many times in the past year have you...    Used an illegal drug or used a prescription medication for non-medical reasons? Never Filed at: 07/21/2025 1003                            History of Present Illness       Chief Complaint   Patient presents with    Syncope     Pt coming in via EMS. EMS [place] was at the bank when she had witnessed syncopal episode. Bystanders report pt went straight down and didn't hit head. Pt lethargic but a&o x4.         Past Medical History[1]   Past Surgical History[2]   Family History[3]   Social History[4]   E-Cigarette/Vaping    E-Cigarette Use Never User       E-Cigarette/Vaping Substances    Nicotine No     THC No     CBD No     Flavoring No     Other No     Unknown No       I have reviewed and agree with the history as documented.     Maira is a 70-year-old female, history of metastatic colon cancer, presenting after syncopal episode.  She apparently had been standing upright for some time, she felt lightheaded and then passed out from standing.  She reports a mild headache at this time.  She reports feeling very tired over the past few days.  She denies cough, fevers or chills, congestion, vomiting, diarrhea.  Denies abdominal or flank pain.  Denies urinary symptoms.      History provided by:  Patient      Review of Systems   Constitutional:  Negative for chills and fever.   HENT:  Negative for congestion, rhinorrhea and sore throat.    Eyes:  Negative for pain and visual disturbance.   Respiratory:  Negative for cough, shortness of breath and wheezing.    Cardiovascular:  Negative for chest pain and palpitations.   Gastrointestinal:  Negative for abdominal pain, nausea and vomiting.   Genitourinary:  Negative for dysuria, frequency and urgency.    Musculoskeletal:  Negative for back pain, neck pain and neck stiffness.   Skin:  Negative for rash and wound.   Neurological:  Positive for syncope, light-headedness and headaches. Negative for dizziness, weakness and numbness.           Objective       ED Triage Vitals [07/21/25 1004]   Temperature Pulse Blood Pressure Respirations SpO2 Patient Position - Orthostatic VS   97.7 °F (36.5 °C) 90 138/63 17 100 % Lying      Temp Source Heart Rate Source BP Location FiO2 (%) Pain Score    Oral Monitor Left arm -- --      Vitals      Date and Time Temp Pulse SpO2 Resp BP Pain Score FACES Pain Rating User   07/21/25 1430 -- 82 100 % 16 168/74 -- -- AA   07/21/25 1330 -- 78 98 % 16 161/69 -- -- AA   07/21/25 1230 -- 86 96 % 17 147/66 -- -- AA   07/21/25 1100 -- 84 100 % 16 124/60 -- -- AA   07/21/25 1004 97.7 °F (36.5 °C) 90 100 % 17 138/63 -- -- AA            Physical Exam  Constitutional:       General: She is not in acute distress.     Appearance: She is well-developed. She is not diaphoretic.   HENT:      Head: Normocephalic and atraumatic.      Right Ear: External ear normal.      Left Ear: External ear normal.     Eyes:      Extraocular Movements:      Right eye: Normal extraocular motion and no nystagmus.      Left eye: Normal extraocular motion and no nystagmus.      Conjunctiva/sclera: Conjunctivae normal.      Pupils: Pupils are equal, round, and reactive to light.       Cardiovascular:      Rate and Rhythm: Normal rate and regular rhythm.   Pulmonary:      Effort: Pulmonary effort is normal. No accessory muscle usage or respiratory distress.      Breath sounds: No wheezing or rales.   Abdominal:      General: Abdomen is flat. There is no distension.      Tenderness: There is no abdominal tenderness.     Musculoskeletal:      Cervical back: Normal range of motion. No rigidity.      Comments: No midline spinal tenderness.  Normal range of motion of bilateral upper and lower extremities without pain or bony  tenderness.     Skin:     General: Skin is warm and dry.      Capillary Refill: Capillary refill takes less than 2 seconds.      Findings: No erythema or rash.     Neurological:      Mental Status: She is alert and oriented to person, place, and time.      Cranial Nerves: Cranial nerves 2-12 are intact.      Motor: No abnormal muscle tone.      Coordination: Coordination normal.      Comments: Pt is somewhat tired however not overtly lethargic. She does alert to voice and is alert and oriented x 3.   Psychiatric:         Behavior: Behavior normal.         Thought Content: Thought content normal.         Judgment: Judgment normal.         Results Reviewed       Procedure Component Value Units Date/Time    Urine Microscopic [[id number]]  (Normal) Collected: 07/21/25 1253    Lab Status: Final result Specimen: Urine, Other Updated: 07/21/25 1310     RBC, UA 1-2 /hpf      WBC, UA 1-2 /hpf      Epithelial Cells Occasional /hpf      Bacteria, UA None Seen /hpf     Urine Macroscopic, POC [[id number]]  (Abnormal) Collected: 07/21/25 1253    Lab Status: Final result Specimen: Urine Updated: 07/21/25 1254     Color, UA Yellow     Clarity, UA Clear     pH, UA 7.0     Leukocytes, UA Negative     Nitrite, UA Negative     Protein, UA Negative mg/dl      Glucose, UA Negative mg/dl      Ketones, UA Negative mg/dl      Urobilinogen, UA 0.2 E.U./dl      Bilirubin, UA Negative     Occult Blood, UA Trace     Specific Gravity, UA 1.015    Narrative:      CLINITEK RESULT    TSH, 3rd generation with Free T4 reflex [[id number]]  (Abnormal) Collected: 07/21/25 1209    Lab Status: Final result Specimen: Blood from Line, Venous Updated: 07/21/25 1247     TSH 3RD GENERATION 5.816 uIU/mL     T4, free [[id number]] Collected: 07/21/25 1209    Lab Status: In process Specimen: Blood from Line, Venous Updated: 07/21/25 1247    Comprehensive metabolic panel [[id number]]  (Abnormal) Collected: 07/21/25 1209    Lab Status: Final result Specimen: Blood from  Line, Venous Updated: 07/21/25 1239     Sodium 133 mmol/L      Potassium 3.8 mmol/L      Chloride 100 mmol/L      CO2 27 mmol/L      ANION GAP 6 mmol/L      BUN 31 mg/dL      Creatinine 0.88 mg/dL      Glucose 102 mg/dL      Calcium 8.8 mg/dL      Corrected Calcium 9.5 mg/dL      AST 14 U/L      ALT 6 U/L      Alkaline Phosphatase 81 U/L      Total Protein 6.6 g/dL      Albumin 3.1 g/dL      Total Bilirubin 0.31 mg/dL      eGFR 67 ml/min/1.73sq m     Narrative:      [employer] Kid[employer] guidelines for Chronic Kidney Disease (CKD):     Stage 1 with normal or high GFR (GFR > 90 mL/min/1.73 square meters)    Stage 2 Mild CKD (GFR = 60-89 mL/min/1.73 square meters)    Stage 3A Moderate CKD (GFR = 45-59 mL/min/1.73 square meters)    Stage 3B Moderate CKD (GFR = 30-44 mL/min/1.73 square meters)    Stage 4 Severe CKD (GFR = 15-29 mL/min/1.73 square meters)    Stage 5 End Stage CKD (GFR <15 mL/min/1.73 square meters)  Note: GFR calculation is accurate only with a steady state creatinine    Magnesium [[id number]]  (Normal) Collected: 07/21/25 1209    Lab Status: Final result Specimen: Blood from Line, Venous Updated: 07/21/25 1239     Magnesium 1.9 mg/dL     HS Troponin I 2hr [[id number]]  (Normal) Collected: 07/21/25 1211    Lab Status: Final result Specimen: Blood from Line, Venous Updated: 07/21/25 1239     hs TnI 2hr 3 ng/L      Delta 2hr hsTnI 0 ng/L     HS Troponin 0hr (reflex protocol) [[id number]]  (Normal) Collected: 07/21/25 1019    Lab Status: Final result Specimen: Blood from Hand, Right Updated: 07/21/25 1054     hs TnI 0hr 3 ng/L     CBC and differential [[id number]]  (Abnormal) Collected: 07/21/25 1019    Lab Status: Final result Specimen: Blood from Hand, Right Updated: 07/21/25 1026     WBC 2.43 Thousand/uL      RBC 3.01 Million/uL      Hemoglobin 9.2 g/dL      Hematocrit 27.9 %      MCV 93 fL      MCH 30.6 pg      MCHC 33.0 g/dL      RDW 13.9 %      MPV 9.0 fL      Platelets 191 Thousands/uL       nRBC 0 /100 WBCs      Segmented % 75 %      Immature Grans % 0 %      Lymphocytes % 19 %      Monocytes % 5 %      Eosinophils Relative 0 %      Basophils Relative 1 %      Absolute Neutrophils 1.81 Thousands/µL      Absolute Immature Grans 0.01 Thousand/uL      Absolute Lymphocytes 0.46 Thousands/µL      Absolute Monocytes 0.12 Thousand/µL      Eosinophils Absolute 0.01 Thousand/µL      Basophils Absolute 0.02 Thousands/µL             CT head without contrast   Final Interpretation by Fletcher Hagan MD (07/21 1234)      No acute intracranial abnormality.                  Workstation performed: [id number]         CT spine cervical without contrast   Final Interpretation by Fletcher Hagan MD (07/21 1231)      1.  Degenerative changes in the cervical spine without acute fracture or dislocation.   2.  2 nodules in the left upper lobe not clearly seen on the prior exams. In a patient with history of malignancy and known metastatic disease these are suspicious for metastatic disease. Attention on follow-up staging exams recommended.                  Workstation performed: [id number]         CT chest abdomen pelvis w contrast   Final Interpretation by Rad Howard MD (07/21 1302)      1.  No findings of acute traumatic injury in the chest, abdomen or pelvis.   2.  Progression of pulmonary and hepatic metastatic disease as discussed.   3.  Small bilateral pleural effusions with adjacent consolidation.   4.  Stable small volume loculated ascites, likely malignant, in the right paracolic gutter.   5.  Cholelithiasis.   6.  Stable endometrial distention and probable cervical mass which may represent metastatic disease or primary malignancy.   7.  See comments above for full analysis.            Computerized Assisted Algorithm (CAA) may have aided analysis of applicable images.      Workstation performed: [id number]             ECG 12 Lead Documentation Only    Date/Time: 7/21/2025 10:15 AM    Performed by:  Sd Mcwilliams PA-C  Authorized by: Sd Mcwilliams PA-C    Indications / Diagnosis:  Syncope  ECG reviewed by me, the ED Provider: yes    Patient location:  ED  Previous ECG:     Previous ECG:  Compared to current    Similarity:  No change  Interpretation:     Interpretation: normal    Rate:     ECG rate:  90    ECG rate assessment: normal    Rhythm:     Rhythm: sinus rhythm    Ectopy:     Ectopy: none    QRS:     QRS axis:  Normal    QRS intervals:  Normal  Conduction:     Conduction: normal    ST segments:     ST segments:  Normal  T waves:     T waves: normal        ED Medication and Procedure Management   Prior to Admission Medications   Prescriptions Last Dose Informant Patient Reported? Taking?   Trifluridine-Tipiracil 20-8.19 MG TABS 7/21/2025 Self No Yes   Sig: Take 3 tablets by mouth 2 (two) times a day Days 1-5 and 8-12 every 28 days   acetaminophen (TYLENOL) 160 mg/5 mL liquid  Self No Yes   Sig: Take 20 mL (640 mg total) by mouth every 6 (six) hours as needed for mild pain for up to 10 days   Patient taking differently: Take 640 mg by mouth if needed for moderate pain or headaches Take 20 mL (640 mg total) by mouth every 6 (six) hours as needed for mild pain for up to 10 days   anastrozole (ARIMIDEX) 1 mg tablet 7/21/2025 Self No Yes   Sig: Take 1 tablet (1 mg total) by mouth daily   atorvastatin (LIPITOR) 40 mg tablet 7/21/2025 Self No Yes   Sig: Take 1 tablet (40 mg total) by mouth daily at bedtime   cholestyramine (QUESTRAN) 4 g packet Not Taking Self No No   Sig: Take 1 packet (4 g total) by mouth 3 (three) times a day with meals   Patient not taking: Reported on 7/16/2025   diphenhydramine, lidocaine, Al/Mg hydroxide, simethicone (Magic Mouthwash) SUSP Not Taking Self No No   Sig: Swish and swallow 10 mL every 4 (four) hours as needed for mouth pain or discomfort   Patient not taking: Reported on 7/16/2025   docusate sodium (COLACE) 50 mg capsule  Self No Yes   Sig: Take 1 capsule (50 mg  total) by mouth 2 (two) times a day   dronabinol (MARINOL) 2.5 mg capsule 2025 Self No Yes   Sig: Take 1 capsule (2.5 mg total) by mouth in the morning   ergocalciferol (VITAMIN D2) 50,000 units 2025 Self No Yes   Sig: Take 1 capsule (50,000 Units total) by mouth once a week   folic acid (FOLVITE) 1 mg tablet 2025 Self No Yes   Sig: Take 1 tablet (1 mg total) by mouth in the morning   gabapentin (NEURONTIN) 300 mg capsule 2025 Self No Yes   Sig: Take 1 pills in the morning, 1 pill at lunch and 2 pills before bed   hydrocortisone 1 % ointment Not Taking Self Yes No   Patient not taking: Reported on 2024   ibuprofen (MOTRIN) 100 mg/5 mL suspension   No No   Sig: Take 20 mL (400 mg total) by mouth every 6 (six) hours as needed for moderate pain   Patient not taking: Reported on 2025   lidocaine-prilocaine (EMLA) cream Not Taking Self No No   Sig: Apply topically as needed for mild pain   Patient not taking: Reported on 2024   losartan (COZAAR) 50 mg tablet 2025 Self No Yes   Sig: Take 1 tablet (50 mg total) by mouth daily   magnesium Oxide (MAG-OX) 400 mg TABS Not Taking Self No No   Si tablet (400 mg total) by Per G Tube route 2 (two) times a day   Patient not taking: Reported on 2025   mirtazapine (REMERON) 15 mg tablet 2025 Self No Yes   Sig: Take 1 tablet (15 mg total) by mouth daily at bedtime Patient is also on a 30 mg tablet, for a total dose of 45 mg   mirtazapine (REMERON) 30 mg tablet 2025 Self No Yes   Sig: Take 1 tablet (30 mg total) by mouth daily at bedtime   nystatin (MYCOSTATIN) 500,000 units/5 mL suspension  Self No Yes   Sig: Apply 5 mL (500,000 Units total) to the mouth or throat 4 (four) times a day   omeprazole (PriLOSEC) 20 mg delayed release capsule 2025 Self No Yes   Sig: Take 1 capsule (20 mg total) by mouth daily   ondansetron (ZOFRAN) 8 mg tablet  Self No Yes   Sig: Take 1 tablet (8 mg total) by mouth every 8 (eight) hours as  needed for nausea or vomiting   oxyCODONE (ROXICODONE) 5 mg/5 mL solution 7/20/2025 Self No Yes   Sig: Take 5 mL (5 mg total) by mouth every 6 (six) hours as needed for severe pain ((breakthrough pain)) Max Daily Amount: 20 mg   potassium chloride (Klor-Con M20) 20 mEq tablet Not Taking Self No No   Sig: Take 1 tablet (20 mEq total) by mouth 2 (two) times a day   Patient not taking: Reported on 1/31/2025   potassium chloride 10% oral solution Not Taking Self No No   Sig: 15 mL (20 mEq total) by Per G Tube route 2 (two) times a day   Patient not taking: Reported on 7/16/2025   prochlorperazine (COMPAZINE) 10 mg tablet Not Taking Self No No   Sig: Take 1 tablet (10 mg total) by mouth every 6 (six) hours as needed for nausea or vomiting   Patient not taking: Reported on 7/21/2025   pyridoxine (VITAMIN B6) 50 mg tablet 7/21/2025 Self No Yes   Sig: Take 1 tablet (50 mg total) by mouth daily   vitamin B-12 (VITAMIN B-12) 1,000 mcg tablet 7/21/2025 Self Yes Yes      Facility-Administered Medications: None     Patient's Medications   Discharge Prescriptions    No medications on file     No discharge procedures on file.  ED SEPSIS DOCUMENTATION   Time reflects when diagnosis was documented in both MDM as applicable and the Disposition within this note       Time User Action Codes Description Comment    7/21/2025  3:23 PM Sd Mcwilliams Add [R55] Syncope                      [1]   Past Medical History:  Diagnosis Date    Anemia     Arthritis     Body mass index (BMI) 40.0-44.9, adult (HCC) 9/22/2023    Breast cancer (HCC) 12/17/2018    Cancer (HCC)     right breast, colon, liver, right tonsil    Cervical lymphadenopathy 3/21/2023    CT neck on 3/30/2023- Large right level 2A lymphadenopathy as described above and suspicious for neoplasm.  Correlation with histopathology is recommended.  Mild asymmetric prominence of the right palatine tonsil with otherwise no definitive nodular enhancing lesions identified along the  course of the aerodigestive tract.     23- FNA of this node was nonrevealing for tissue etiology, but it d    Encounter for screening for HIV 2022    Follow-up examination 2023    GERD (gastroesophageal reflux disease)     Hyperlipidemia     Hypertension     Obesity     Stroke (HCC)     TIA     Stroke (HCC)     TIA     Transaminitis 2021    Vasomotor rhinitis 2018    Refilled flonase today. Stopped taking since 2022   [2]   Past Surgical History:  Procedure Laterality Date    BREAST BIOPSY Right 2018    us guided bx cancer    BREAST SURGERY      COLONOSCOPY  2023    ESOPHAGOSCOPY N/A 2023    Procedure: ESOPHAGOSCOPY;  Surgeon: David Chapa MD;  [facility];  Service: ENT    HEMICOLOECTOMY W/ ANASTOMOSIS Right 2024    Procedure: RIGHT COLECTOMY WITH ROBOTICS;  Surgeon: Vickie Israel MD;  [facility]: [facility];  Service: Surgical Oncology    IR BIOPSY LIVER MASS  2023    IR CRYOABLATION  2024    IR GASTROSTOMY (G) TUBE CHECK/CHANGE/REINSERTION/UPSIZE  2025    IR GASTROSTOMY TUBE PLACEMENT  10/02/2024    IR PORT CHECK  2024    IR PORT PLACEMENT  2023    IR PORT STRIPPING  2024    LAPAROTOMY N/A 2024    Procedure: LAPAROTOMY EXPLORATORY W/ ROBOTICS;  Surgeon: Vickie Israel MD;  [facility];  Service: Surgical Oncology    CO BIOPSY NASOPHARYNX VISIBLE LESION SIMPLE N/A 10/09/2024    Procedure: NASOPHARYNGOSCOPY with biospy;  Surgeon: Juanito Matthew MD;  [facility];  Service: ENT    CO BRNCC INCL FLUOR GDNCE DX W/CELL WASHG SPX N/A 2023    Procedure: BRONCHOSCOPY;  Surgeon: David Chapa MD;  [facility];  Service: ENT    CO LARYNGOSCOPY W/BIOPSY MICROSCOPE/TELESCOPE N/A 2023    Procedure: MICRODIRECT LARYNGOSCOPY WITH BIOPSY;  Surgeon: David Chapa MD;  [facility];  Service: ENT    CO LARYNGOSCOPY W/WO TRACHEOSCOPY DX EXCEPT  N/A  10/09/2024    Procedure: DIRECT LARYNGOSCOPY;  Surgeon: Juanito Matthew MD;  [facility];  Service: ENT    CA MASTECTOMY PARTIAL W/AXILLARY LYMPHADENECTOMY Right 12/20/2018    Procedure: ULTRASOUND LOCALIZED PARTIAL MASTECTOMY W/SENTINEL NODE BIOPSY POSS AXILLARY DISSECTION;  Surgeon: Zahida Coronado MD;  Location: [facility];  Service: General    CA TONSILLECTOMY PRIMARY/SECONDARY AGE 12/> N/A 10/09/2024    Procedure: TONSILLECTOMY;  Surgeon: Juanito Matthew MD;  [facility];  Service: ENT    TUBAL LIGATION      US GUIDED BREAST BIOPSY RIGHT COMPLETE Right 11/23/2018    US GUIDED INJECTION FOR RESEARCH STUDY  12/20/2018    US GUIDED INJECTION FOR RESEARCH STUDY  12/07/2018    US GUIDED INJECTION FOR RESEARCH STUDY  05/03/2019    US GUIDED LYMPH NODE BIOPSY RIGHT  05/26/2023   [3]   Family History  Problem Relation Name Age of Onset    Colon cancer Mother  58    Hypertension Mother      Pancreatic cancer Father  60    Hypertension Daughter      Hypertension Son     [4]   Social History  Tobacco Use    Smoking status: Never     Passive exposure: Never    Smokeless tobacco: Never    Tobacco comments:     NO TOBACCO USE   Vaping Use    Vaping status: Never Used   Substance Use Topics    Alcohol use: Never    Drug use: Never        Sd Mcwilliams PA-C  07/21/25 9979

## 2025-07-22 ENCOUNTER — OFFICE VISIT (OUTPATIENT)
Dept: SPEECH THERAPY | Facility: CLINIC | Age: 70
End: 2025-07-22
Attending: INTERNAL MEDICINE
Payer: MEDICARE

## 2025-07-22 ENCOUNTER — TELEPHONE (OUTPATIENT)
Dept: HEMATOLOGY ONCOLOGY | Facility: CLINIC | Age: 70
End: 2025-07-22

## 2025-07-22 DIAGNOSIS — E86.0 DEHYDRATION: Primary | ICD-10-CM

## 2025-07-22 DIAGNOSIS — R13.12 DYSPHAGIA, OROPHARYNGEAL PHASE: Primary | ICD-10-CM

## 2025-07-22 DIAGNOSIS — Z90.89 STATUS POST TONSILLECTOMY: ICD-10-CM

## 2025-07-22 DIAGNOSIS — C09.9 RIGHT TONSILLAR SQUAMOUS CELL CARCINOMA (HCC): ICD-10-CM

## 2025-07-22 PROCEDURE — 92526 ORAL FUNCTION THERAPY: CPT

## 2025-07-22 NOTE — TELEPHONE ENCOUNTER
Spoke with patient regarding her ED visit. I advised her that because of the fall, we are going to hold the Avastin. Patient does want to get IV fluids tomorrow. I advised her that I will let the infusion center know of this change. Patient states she overall feels well and thinks that she passed out due to dehydration. I advised the patient to try her best to stay hydrated and we will see her in the office next week. Patient verbalized understanding and is in agreement with the plan.

## 2025-07-22 NOTE — PROGRESS NOTES
Daily Speech Treatment Note    Today's date: 2025  Patient’s name: Maira Moura  : 1955  MRN: [id number]  Safety measures: HTN, CVA, GERD, active CA, s/p PEG tube placement, aspiration precautions  Current recommended diet: PEG tube for primary nutrition/hydration; mech soft/moist with thin liquids P.O. as tolerated   Referring provider: Harika Medina DO Encounter Diagnosis     ICD-10-CM    1. Dysphagia, oropharyngeal phase  R13.12       2. Status post tonsillectomy  Z90.89       3. 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 trials of her recommended diet with the implementation of safe swallowing strategies without overt s/sx of penetration and/or aspiration during skilled therapy sessions in this certification period. -- PARTIALLY MET/ONGOING    Traditional VitalStim     Channel(s) used: 1, 2   Placement: 2b   Duty Cycle: 57/1 s   Frequency: 80 pulses per second   Phase Duration: 300 microseconds   Treatment Duration: 37 minutes   Min mA level: 4.0 mA   Max mA level: 7.0 mA      Patient tolerated NMES in conjunction with pharyngeal/laryngeal strengthening exercises and P.O. intake. (The patient received instruction on the potential benefits from NMES treatment, including neuromuscular re-education and muscle strengthening.) Skin condition post-NMES: intact.      Patient consumed the following during today’s session: chocolate pudding and water (thin liquid) via side of cup -- NMES remained on during pharyngeal strengthening exercises (see below).      Patient consumed pudding at a steady rate today, which is an improvement.   She reports she feels she can swallow faster now; and with less effort (puree)    Patient did not want to trial any other food consistency today.    Oral containment, mastication, bolus formation/control, AP transfer, and swallow initiation were judged to be WFL. Reduced hyolaryngeal elevation and excursion noted upon palpation. No oral cavity residue was observed. Patient without any c/o globus sensation. No overt s/sx of penetration/aspiration noted during today's session, which is an improvement.     Patient will independently complete pharyngeal strengthening exercises (e.g., Effortful swallow, Mendelsohn, Evelia) daily to facilitate increased pharyngeal strength and coordination. -- PARTIALLY MET/ONGOING   -Patient completed the following pharyngeal strengthening exercises during today's session:  *Effortful swallow: 2 sets of 10 reps  *Evelia: 2 sets of 10 reps  *Mendelsohn: 2 sets of 10 reps    Plan:  -Patient was provided with home exercises/activities to target goals in plan of care at the end of today's session.  -Continue with current plan of care.

## 2025-07-22 NOTE — PROGRESS NOTES
Received phone call from Hanane GUZMAN RN, pt's treatment to be held tomorrow, IV hydration to be given. Pt aware.

## 2025-07-23 ENCOUNTER — NUTRITION (OUTPATIENT)
Dept: NUTRITION | Facility: CLINIC | Age: 70
End: 2025-07-23

## 2025-07-23 ENCOUNTER — HOSPITAL ENCOUNTER (OUTPATIENT)
Dept: INFUSION CENTER | Facility: CLINIC | Age: 70
Discharge: HOME | End: 2025-07-23
Attending: INTERNAL MEDICINE
Payer: MEDICARE

## 2025-07-23 VITALS
SYSTOLIC BLOOD PRESSURE: 124 MMHG | TEMPERATURE: 98.7 F | RESPIRATION RATE: 18 BRPM | HEART RATE: 94 BPM | DIASTOLIC BLOOD PRESSURE: 81 MMHG

## 2025-07-23 DIAGNOSIS — Z71.3 NUTRITIONAL COUNSELING: Primary | ICD-10-CM

## 2025-07-23 DIAGNOSIS — C78.7 METASTATIC COLON CANCER TO LIVER (HCC): ICD-10-CM

## 2025-07-23 DIAGNOSIS — E86.0 DEHYDRATION: Primary | ICD-10-CM

## 2025-07-23 DIAGNOSIS — C18.9 METASTATIC COLON CANCER TO LIVER (HCC): ICD-10-CM

## 2025-07-23 LAB
ALBUMIN SERPL BCG-MCNC: 3.4 G/DL (ref 3.5–5)
ALP SERPL-CCNC: 86 U/L (ref 34–104)
ALT SERPL W P-5'-P-CCNC: 7 U/L (ref 7–52)
ANION GAP SERPL CALCULATED.3IONS-SCNC: 5 MMOL/L (ref 4–13)
AST SERPL W P-5'-P-CCNC: 16 U/L (ref 13–39)
BASOPHILS # BLD AUTO: 0.01 THOUSANDS/ÂΜL (ref 0–0.1)
BASOPHILS NFR BLD AUTO: 1 % (ref 0–1)
BILIRUB SERPL-MCNC: 0.32 MG/DL (ref 0.2–1)
BUN SERPL-MCNC: 30 MG/DL (ref 5–25)
CALCIUM ALBUM COR SERPL-MCNC: 9.5 MG/DL (ref 8.3–10.1)
CALCIUM SERPL-MCNC: 9 MG/DL (ref 8.4–10.2)
CHLORIDE SERPL-SCNC: 100 MMOL/L (ref 96–108)
CO2 SERPL-SCNC: 29 MMOL/L (ref 21–32)
CREAT SERPL-MCNC: 0.86 MG/DL (ref 0.6–1.3)
EOSINOPHIL # BLD AUTO: 0.01 THOUSAND/ÂΜL (ref 0–0.61)
EOSINOPHIL NFR BLD AUTO: 1 % (ref 0–6)
ERYTHROCYTE [DISTWIDTH] IN BLOOD BY AUTOMATED COUNT: 14 % (ref 11.6–15.1)
GFR SERPL CREATININE-BSD FRML MDRD: 69 ML/MIN/1.73SQ M
GLUCOSE SERPL-MCNC: 103 MG/DL (ref 65–140)
HCT VFR BLD AUTO: 20.4 % (ref 34.8–46.1)
HGB BLD-MCNC: 7 G/DL (ref 11.5–15.4)
IMM GRANULOCYTES # BLD AUTO: 0.03 THOUSAND/UL (ref 0–0.2)
IMM GRANULOCYTES NFR BLD AUTO: 2 % (ref 0–2)
LYMPHOCYTES # BLD AUTO: 0.34 THOUSANDS/ÂΜL (ref 0.6–4.47)
LYMPHOCYTES NFR BLD AUTO: 23 % (ref 14–44)
MAGNESIUM SERPL-MCNC: 1.9 MG/DL (ref 1.9–2.7)
MCH RBC QN AUTO: 30.6 PG (ref 26.8–34.3)
MCHC RBC AUTO-ENTMCNC: 34.3 G/DL (ref 31.4–37.4)
MCV RBC AUTO: 89 FL (ref 82–98)
MONOCYTES # BLD AUTO: 0.09 THOUSAND/ÂΜL (ref 0.17–1.22)
MONOCYTES NFR BLD AUTO: 6 % (ref 4–12)
NEUTROPHILS # BLD AUTO: 1 THOUSANDS/ÂΜL (ref 1.85–7.62)
NEUTS SEG NFR BLD AUTO: 67 % (ref 43–75)
NRBC BLD AUTO-RTO: 0 /100 WBCS
PLATELET # BLD AUTO: 158 THOUSANDS/UL (ref 149–390)
PMV BLD AUTO: 9.7 FL (ref 8.9–12.7)
POTASSIUM SERPL-SCNC: 4.1 MMOL/L (ref 3.5–5.3)
PROT SERPL-MCNC: 7.1 G/DL (ref 6.4–8.4)
RBC # BLD AUTO: 2.29 MILLION/UL (ref 3.81–5.12)
SODIUM SERPL-SCNC: 134 MMOL/L (ref 135–147)
WBC # BLD AUTO: 1.48 THOUSAND/UL (ref 4.31–10.16)

## 2025-07-23 PROCEDURE — 83735 ASSAY OF MAGNESIUM: CPT | Performed by: INTERNAL MEDICINE

## 2025-07-23 PROCEDURE — 80053 COMPREHEN METABOLIC PANEL: CPT | Performed by: INTERNAL MEDICINE

## 2025-07-23 PROCEDURE — 36593 DECLOT VASCULAR DEVICE: CPT

## 2025-07-23 PROCEDURE — 85025 COMPLETE CBC W/AUTO DIFF WBC: CPT | Performed by: INTERNAL MEDICINE

## 2025-07-23 PROCEDURE — 96361 HYDRATE IV INFUSION ADD-ON: CPT

## 2025-07-23 PROCEDURE — 96374 THER/PROPH/DIAG INJ IV PUSH: CPT

## 2025-07-23 RX ADMIN — ONDANSETRON 8 MG: 2 INJECTION INTRAMUSCULAR; INTRAVENOUS at 13:01

## 2025-07-23 RX ADMIN — SODIUM CHLORIDE 1000 ML: 0.9 INJECTION, SOLUTION INTRAVENOUS at 12:55

## 2025-07-23 RX ADMIN — ALTEPLASE 2 MG: 2.2 INJECTION, POWDER, LYOPHILIZED, FOR SOLUTION INTRAVENOUS at 12:16

## 2025-07-23 NOTE — PATIENT INSTRUCTIONS
Nutrition Rx & Recommendations:  Diet: enteral nutrition as 1' source of nutrition, soft diet-high calorie high protein  Stay hydrated by sipping fluids of choice/tolerance throughout the day.   Follow proper oral care; Try baking soda/salt water rinse recipe (mix 3/4 tsp salt + 1 tsp baking soda + 1 qt water; rinse with plain water after using) in Eating Hints book (pg 18).  Brush your teeth before/after meals & before bed.  Weigh yourself regularly. If you notice weight loss, make an effort to increase your daily food/calorie intake. If you continue to notice loss after these efforts, reach out to your dietitian to establish a plan to stabilize weight.   Continue with Ensure Plus/Complete 2 times daily. try drinking orally, but OK to put through PEG if tolerated better  Choose liquids with calories: whole milk, [employer]life milk (higher protein/lactose-free milk), chocolate milk, drinkable yogurt, kefir, 100% fruit juice, diluted juice, bone broth (higher protein broth), creamy soups, sports drinks (Gatorade, Poweraide, Pedialyte, etc.), Italian ice, popsicles, milkshakes, smoothies, oral nutrition supplements (Ensure, Boost, etc.), gelatin/Jello, etc.  Soft/easy to swallow foods that are high in calories and protein: casseroles, chicken/egg/tuna salad with extra garcia to add calories and moisture, oatmeal/cream of wheat made with whole milk, cottage cheese, whole milk Greek yogurt, scrambled eggs with cheese, avocado, macaroni and cheese, mashed potatoes made with butter and whole milk or dry milk powder, ground meat with extra sauce/gravy, canned fruit, peanut butter, cream soups (cream of chicken, cream of mushroom, broccoli cheddar), pudding made with whole milk, custard, ice cream, banana, milkshakes/smoothies, Review page 47 of Eating Hints Book for more ideas.       TF plan -   Continue at least 2 cartons of [employer] 1.5 daily  If PO decreases and weight drops more, consider increasing to goal TF which  is:  [employer] 1.5 4 cartons daily (1300 mL total volume, 2000 calories, 96g protein, and 910 mL free water)  Contact RD for substitutions  Call [facility] when you have 10 days left of TF supplies: [phone], option 3 (customer support).      Follow Up Plan: pending infusion schedule   Recommend Referral to Other Providers: none at this time

## 2025-07-23 NOTE — PROGRESS NOTES
Outpatient Oncology Nutrition Consultation   Type of Consult: Follow Up   Care Location: [facility]    Reason for referral: Received notification by [facility] PEPE ZAPATA on 8/21/24 that pt has triggered for oncology nutrition care (reason for referral: HNC dx and Malnutrition Screening Tool (MST) Triggers: scored a 0 indicating No recent wt loss and is not eating poorly due to a decreased appetite. 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, unresectable     7/31/2023 - FOLFOX     11/20/2023 - opdivo added to FOLFOX     12/18/2023-cycle 11-20% dose reduction of 5-FU bolus and oxaliplatin due to increased fatigue     Jan 2024 - oxaliplatin removed from treatment plan due to neuropathy - PET scan shows good disease control in all areas except colon lesion     3/2024 - right hemicolectomy - pT3N0     6/3/24 - cryoablation to liver (no interruption to systemic therapy)     7/30/2024 - stop folfox     9/2024 - 10/2024 - cis/RT to tonsils and cervical Lns      12/2024 - disease progression of colon cancer in the liver     01/2025 - Initiated FOLFIRI every 14-days, with 50% dose-reduction of Irinotecan due to pre-existing diarrhea.     02/05/2025 - Cancelled Cycle 3 of FOLFIRI due to significant chemotherapy-induced toxicities (Including: Neutropenia). Will remove 5-Fluorouracil bolus and growth factor support beginning with subsequent cycles of treatment.    5/21/2025: Progression - new and enlarging hepatic mets, small pulmonary nodules    6/11/2025: Discontinue FOLFIRI, start Trifluridine and Tipiracil PO BID days 1-5 and 8-12 on a 28 day cycle PLUS Bevacizumab 5mg/kg IV days 1 and 15         Malignant neoplasm of right female breast (HCC)   11/23/2018 Initial Diagnosis    Malignant neoplasm of right female breast (HCC)     11/23/2018 Biopsy    Rt Breast US BX 1100 8cmfn, 4 passes 12g Marquee:  - Invasive breast carcinoma of no special type (ductal NST/invasive ductal carcinoma).  - grade 2  - %  - IL 95%  - HER2 negative     12/20/2018 Surgery    Right partial mastectomy and SLN biopsy:  Infiltrating ductal carcinoma, Grade 2/3, felt to be between 2.0 cm and 2.5 cm in greatest dimension  - No invasive tumor is seen at inked margins, but there is a focus of DCIS seen within approximately 1mm of the inked medial margin  - no LVI  - no LCIS  - 0/2 LN's  at least Stage IIA - pT2, pN0, cM0, G2.    - Dr Coronado     12/20/2018 -  Cancer Staged    Stage IIA - pT2, pN0, cM0, G2.       1/4/2019 Genomic Testing    Oncotype DX score: 13     2/1/2019 -  Hormone Therapy    anastrozole 1 mg daily as adjuvant endocrine therapy    - Dr Daley       2/14/2019 - 4/3/2019 Radiation    Course: C1    Plan ID Energy Fractions Dose per Fraction (cGy) Dose Correction (cGy) Total Dose Delivered (cGy) Elapsed Days   R Breast 10X/6X 25 / 25 200 0 5,000 40   R BRST BOOST 16E 5 / 5 200 0 1,000 7      Treatment dates:  C1: 2/14/2019 - 4/3/2019       Metastatic colon cancer to liver (HCC)  "  7/6/2023 Genetic Testing    CARIS Results:   KRAS Pathogenic Variant Exon 2  p.G12D : lack of benefit of cetuximab, panitumumab Level 2   No other actionable mutation; MSI stable; TMB low   MiFOLOXAi Results: \"Decreased Benefit of FOLFOX + Bevacizumab in first line metastatic CRC.  This patient may achieve improved results by receiving an alternative to FOLFOX, such as FOLFIRI, as their initial regimen. As an adjustment to frontline FOLFOXIRI following toxicity: This patient may achieve improved results by removing the oxaliplatin portion of their regimen\".         7/11/2023 Initial Diagnosis    Metastatic colon cancer to liver (HCC)     7/13/2023 -  Cancer Staged    Staging form: Colon and Rectum, AJCC 8th Edition  - Clinical stage from 7/13/2023: cT3, cN0, pM1 - Signed by Harika Medina DO on 9/28/2023  Total positive nodes: 0       7/31/2023 - 8/1/2024 Chemotherapy    cyanocobalamin, 1,000 mcg, Intramuscular, Every 30 days, 7 of 9 cycles  Administration: 1,000 mcg (5/9/2024), 1,000 mcg (5/24/2024), 1,000 mcg (6/20/2024), 1,000 mcg (7/3/2024), 1,000 mcg (7/18/2024), 1,000 mcg (8/1/2024)  potassium chloride, 20 mEq, Intravenous, Once, 2 of 2 cycles  Administration: 20 mEq (11/6/2023), 20 mEq (11/20/2023)  alteplase (CATHFLO), 2 mg, Intracatheter, Every 1 Minute as needed, 21 of 23 cycles  Administration: 2 mg (1/3/2024)  pegfilgrastim (NEULASTA), 6 mg, Subcutaneous, Once, 12 of 12 cycles  Administration: 6 mg (10/25/2023), 6 mg (11/8/2023), 6 mg (11/22/2023), 6 mg (12/6/2023), 6 mg (12/20/2023), 6 mg (1/12/2024), 6 mg (1/25/2024), 6 mg (4/25/2024), 6 mg (5/9/2024), 6 mg (5/24/2024), 6 mg (6/20/2024)  fluorouracil (ADRUCIL), 895 mg, Intravenous, Once, 21 of 23 cycles  Dose modification: 320 mg/m2 (original dose 400 mg/m2, Cycle 11)  Administration: 900 mg (7/31/2023), 900 mg (8/14/2023), 900 mg (8/28/2023), 900 mg (9/11/2023), 900 mg (9/25/2023), 900 mg (10/9/2023), 900 mg (10/23/2023), 895 mg (11/6/2023), 895 " mg (11/20/2023), 895 mg (12/4/2023), 700 mg (12/18/2023), 680 mg (1/10/2024), 680 mg (1/23/2024), 625 mg (4/23/2024), 625 mg (5/7/2024), 625 mg (5/22/2024), 625 mg (6/4/2024), 625 mg (6/18/2024), 625 mg (7/1/2024), 625 mg (7/16/2024), 625 mg (7/30/2024)  nivolumab (OPDIVO) IVPB, 240 mg (100 % of original dose 240 mg), Intravenous, Once, 13 of 15 cycles  Dose modification: 240 mg (original dose 240 mg, Cycle 9)  Administration: 240 mg (11/20/2023), 240 mg (12/4/2023), 240 mg (12/18/2023), 240 mg (1/10/2024), 240 mg (1/23/2024), 240 mg (4/23/2024), 240 mg (5/7/2024), 240 mg (5/22/2024), 240 mg (6/4/2024), 240 mg (6/18/2024), 240 mg (7/1/2024), 240 mg (7/16/2024), 240 mg (7/30/2024)  leucovorin calcium IVPB, 896 mg, Intravenous, Once, 21 of 23 cycles  Administration: 900 mg (7/31/2023), 900 mg (8/14/2023), 900 mg (8/28/2023), 900 mg (9/11/2023), 900 mg (9/25/2023), 900 mg (10/9/2023), 900 mg (10/23/2023), 900 mg (11/6/2023), 900 mg (11/20/2023), 900 mg (12/4/2023), 900 mg (12/18/2023), 850 mg (1/10/2024), 850 mg (1/23/2024), 800 mg (4/23/2024), 800 mg (5/7/2024), 800 mg (5/22/2024), 800 mg (6/4/2024), 800 mg (6/18/2024), 800 mg (7/1/2024), 800 mg (7/16/2024), 800 mg (7/30/2024)  oxaliplatin (ELOXATIN) chemo infusion, 85 mg/m2 = 190.4 mg, Intravenous, Once, 12 of 12 cycles  Dose modification: 68 mg/m2 (original dose 85 mg/m2, Cycle 11, Reason: Other (Must fill in a comment), Comment: increased fatigue)  Administration: 190.4 mg (7/31/2023), 190.4 mg (8/14/2023), 200 mg (8/28/2023), 200 mg (9/11/2023), 200 mg (9/25/2023), 200 mg (10/9/2023), 200 mg (10/23/2023), 200 mg (11/6/2023), 200 mg (11/20/2023), 190.4 mg (12/4/2023), 150 mg (12/18/2023), 150 mg (1/10/2024)  fluorouracil (ADRUCIL) ambulatory infusion Soln, 1,200 mg/m2/day = 5,375 mg, Intravenous, Over 46 hours, 21 of 23 cycles     9/26/2023 -  Cancer Staged    Staging form: Colon and Rectum, AJCC 8th Edition  - Pathologic: pT3, pN0, cM1 - Signed by Vickie MARISCAL  MD Jhonatan on 4/3/2024  Total positive nodes: 0       3/12/2024 Surgery    A. Terminal ileum, appendix, right colon (right hemicolectomy):  - Adenocarcinoma (5.2cm)  - Forty-nine (49) lymph nodes negative for carcinoma (0/49)    - Acellular mucin present in one (1) lymph node   - See staging synoptic (ypT3N0)     1/8/2025 - 5/16/2025 Chemotherapy    fluorouracil (ADRUCIL), 400 mg/m2 = 770 mg, 2 of 2 cycles  Administration: 770 mg (1/8/2025), 770 mg (1/22/2025)  irinotecan (CAMPTOSAR) chemo infusion, 173 mg (50 % of original dose 180 mg/m2), 8 of 11 cycles  Dose modification: 90 mg/m2 (original dose 180 mg/m2, Cycle 1, Reason: Anticipated Tolerance, Comment: 50% dose reduction due to pre-existing diarrhea)  Administration: 180 mg (1/8/2025), 180 mg (1/22/2025), 160 mg (3/5/2025), 160 mg (3/19/2025), 160 mg (4/2/2025), 160 mg (4/16/2025), 160 mg (4/30/2025), 160 mg (5/14/2025)  leucovorin calcium IVPB, 768 mg, 2 of 2 cycles  Administration: 800 mg (1/8/2025), 800 mg (1/22/2025)  fluorouracil (ADRUCIL) ambulatory infusion Soln, 1,200 mg/m2/day = 4,610 mg, 8 of 11 cycles     6/11/2025 -  Chemotherapy    bevacizumab (AVASTIN) IVPB, 5 mg/kg = 362.5 mg (50 % of original dose 10 mg/kg), 3 of 6 cycles  Dose modification: 5 mg/kg (original dose 10 mg/kg, Cycle 1, Reason: Other (Must fill in a comment), Comment: per protocol)  Administration: 362.5 mg (6/11/2025), 362.5 mg (6/25/2025), 362.5 mg (7/9/2025)     Right tonsillar squamous cell carcinoma (HCC)   7/27/2023 Initial Diagnosis    Right tonsillar squamous cell carcinoma (HCC)     7/27/2023 -  Cancer Staged    Staging form: Pharynx - Oropharynx, AJCC 8th Edition  - Clinical stage from 7/27/2023: Stage III (cT1, cN3, cM0, p16+) - Signed by Evan Plummer MD on 7/27/2023  Stage prefix: Initial diagnosis       9/16/2024 - 11/13/2024 Radiation      Plan ID Energy Fractions Dose per Fraction (cGy) Dose Correction (cGy) Total Dose Delivered (cGy) Elapsed Days   Head_Neck  6X-FFF 35 / 35 200 0 7,000 58    C2: 9/16/2024 - 11/13/2024 9/17/2024 - 10/21/2024 Chemotherapy    alteplase (CATHFLO), 2 mg, Intracatheter, Every 1 Minute as needed, 6 of 6 cycles  Administration: 2 mg (10/21/2024)  CISplatin (PLATINOL) infusion, 70 mg (original dose 40 mg/m2), Intravenous, Once, 6 of 6 cycles  Dose modification: 70 mg (original dose 40 mg/m2, Cycle 1, Reason: Anticipated Tolerance), 30 mg/m2 (original dose 40 mg/m2, Cycle 4, Reason: Neuropathy, Comment: dose reduction to 30 mg/m2 due to neuropathy)  Administration: 70 mg (9/17/2024), 70 mg (9/23/2024), 70 mg (9/30/2024), 59.1 mg (10/7/2024), 59.1 mg (10/14/2024), 59.1 mg (10/21/2024)  sodium chloride, 500 mL, Intravenous, Once, 6 of 6 cycles  Administration: 500 mL (9/17/2024), 500 mL (9/17/2024), 500 mL (9/23/2024), 500 mL (9/23/2024), 500 mL (9/30/2024), 500 mL (9/30/2024), 500 mL (10/7/2024), 500 mL (10/7/2024), 500 mL (10/14/2024), 500 mL (10/14/2024), 500 mL (10/21/2024)  fosaprepitant (EMEND) IVPB, 150 mg, Intravenous, Once, 6 of 6 cycles  Administration: 150 mg (9/17/2024), 150 mg (9/23/2024), 150 mg (9/30/2024), 150 mg (10/7/2024), 150 mg (10/14/2024), 150 mg (10/21/2024)  IVPB builder, , Intravenous, Once, 1 of 1 cycle  Administration: Unknown dose (10/21/2024)       Past Medical & Surgical Hx:   Patient Active Problem List   Diagnosis    Primary hypertension    Mixed hyperlipidemia    Malignant neoplasm of right female breast (HCC)    Vitamin D deficiency    Prediabetes    Right thyroid nodule    GERD (gastroesophageal reflux disease)    Obesity    Metastatic colon cancer to liver (HCC)    Right tonsillar squamous cell carcinoma (HCC)    Poor appetite    Nutritional anemia    Dehydration    Drug-induced constipation    Chemotherapy induced neutropenia (HCC)    Stage 3a chronic kidney disease (HCC)    Thrombocytopenia (HCC)    Neuropathy    Diastolic dysfunction    Hypokalemia    Leukemoid reaction    GOGO (acute kidney injury) (HCC)     Acute post-operative pain    At risk for constipation    At risk for delirium    Palliative care encounter    Physical deconditioning    History of CVA (cerebrovascular accident)    Chronic nasal congestion    Elevated LFTs    Vitamin B12 deficiency    Neoplastic malignant related fatigue    Sensation, choking    Gastrostomy tube in situ (HCC)    Pancytopenia due to chemotherapy (HCC)    Cancer related pain    Hypomagnesemia    Functional diarrhea    Antineoplastic chemotherapy induced anemia    Hypertensive heart and renal disease with congestive heart failure (HCC)     Past Medical History:   Diagnosis Date    Anemia     Arthritis     Body mass index (BMI) 40.0-44.9, adult (HCC) 9/22/2023    Breast cancer (HCC) 12/17/2018    Cancer (HCC)     right breast, colon, liver, right tonsil    Cervical lymphadenopathy 3/21/2023    CT neck on 3/30/2023- Large right level 2A lymphadenopathy as described above and suspicious for neoplasm.  Correlation with histopathology is recommended.  Mild asymmetric prominence of the right palatine tonsil with otherwise no definitive nodular enhancing lesions identified along the course of the aerodigestive tract.     5/26/23- FNA of this node was nonrevealing for tissue etiology, but it d    Encounter for screening for HIV 07/07/2022    Follow-up examination 04/04/2023    GERD (gastroesophageal reflux disease)     Hyperlipidemia     Hypertension     Obesity     Stroke (HCC)     TIA     Stroke (HCC)     TIA     Transaminitis 09/22/2021    Vasomotor rhinitis 8/9/2018    Refilled flonase today. Stopped taking since January 2022     Past Surgical History:   Procedure Laterality Date    BREAST BIOPSY Right 11/23/2018    us guided bx cancer    BREAST SURGERY      COLONOSCOPY  07/06/2023    ESOPHAGOSCOPY N/A 07/20/2023    Procedure: ESOPHAGOSCOPY;  Surgeon: David Cahpa MD;  [facility];  Service: ENT    HEMICOLOECTOMY W/ ANASTOMOSIS Right 03/12/2024    Procedure: RIGHT  COLECTOMY WITH ROBOTICS;  Surgeon: Vickie Israel MD;  [facility];  Service: Surgical Oncology    IR BIOPSY LIVER MASS  2023    IR CRYOABLATION  2024    IR GASTROSTOMY (G) TUBE CHECK/CHANGE/REINSERTION/UPSIZE  2025    IR GASTROSTOMY TUBE PLACEMENT  10/02/2024    IR PORT CHECK  2024    IR PORT PLACEMENT  2023    IR PORT STRIPPING  2024    LAPAROTOMY N/A 2024    Procedure: LAPAROTOMY EXPLORATORY W/ ROBOTICS;  Surgeon: Vickie Israel MD;  [facility];  Service: Surgical Oncology    OR BIOPSY NASOPHARYNX VISIBLE LESION SIMPLE N/A 10/09/2024    Procedure: NASOPHARYNGOSCOPY with biospy;  Surgeon: Juanito Matthew MD;  [facility];  Service: ENT    OR BRNCHSC INCL FLUOR GDNCE DX W/CELL WASHG SPX N/A 2023    Procedure: BRONCHOSCOPY;  Surgeon: David Chapa MD;  [facility];  Service: ENT    OR LARYNGOSCOPY W/BIOPSY MICROSCOPE/TELESCOPE N/A 2023    Procedure: MICRODIRECT LARYNGOSCOPY WITH BIOPSY;  Surgeon: David Chapa MD;  [facility];  Service: ENT    OR LARYNGOSCOPY W/WO TRACHEOSCOPY DX EXCEPT  N/A 10/09/2024    Procedure: DIRECT LARYNGOSCOPY;  Surgeon: Juanito Matthew MD;  [facility];  Service: ENT    OR MASTECTOMY PARTIAL W/AXILLARY LYMPHADENECTOMY Right 2018    Procedure: ULTRASOUND LOCALIZED PARTIAL MASTECTOMY W/SENTINEL NODE BIOPSY POSS AXILLARY DISSECTION;  Surgeon: Zahida oCronado MD;  Location: [facility];  Service: General    OR TONSILLECTOMY PRIMARY/SECONDARY AGE 12/> N/A 10/09/2024    Procedure: TONSILLECTOMY;  Surgeon: Juanito Matthew MD;  [facility];  Service: ENT    TUBAL LIGATION      US GUIDED BREAST BIOPSY RIGHT COMPLETE Right 2018    US GUIDED INJECTION FOR RESEARCH STUDY  2018    US GUIDED INJECTION FOR RESEARCH STUDY  2018    US GUIDED INJECTION FOR RESEARCH STUDY  2019    US GUIDED LYMPH NODE BIOPSY RIGHT  2023        Review of Medications:   Vitamins, Supplements and Herbals: No, pt denies taking supplements    Current Outpatient Medications:     acetaminophen (TYLENOL) 160 mg/5 mL liquid, Take 20 mL (640 mg total) by mouth every 6 (six) hours as needed for mild pain for up to 10 days (Patient taking differently: Take 640 mg by mouth if needed for moderate pain or headaches Take 20 mL (640 mg total) by mouth every 6 (six) hours as needed for mild pain for up to 10 days), Disp: 473 mL, Rfl: 3    anastrozole (ARIMIDEX) 1 mg tablet, Take 1 tablet (1 mg total) by mouth daily, Disp: 90 tablet, Rfl: 0    atorvastatin (LIPITOR) 40 mg tablet, Take 1 tablet (40 mg total) by mouth daily at bedtime, Disp: 90 tablet, Rfl: 3    cholestyramine (QUESTRAN) 4 g packet, Take 1 packet (4 g total) by mouth 3 (three) times a day with meals (Patient not taking: Reported on 7/16/2025), Disp: 90 packet, Rfl: 3    diphenhydramine, lidocaine, Al/Mg hydroxide, simethicone (Magic Mouthwash) SUSP, Swish and swallow 10 mL every 4 (four) hours as needed for mouth pain or discomfort (Patient not taking: Reported on 7/16/2025), Disp: 480 mL, Rfl: 2    docusate sodium (COLACE) 50 mg capsule, Take 1 capsule (50 mg total) by mouth 2 (two) times a day, Disp: 90 capsule, Rfl: 1    dronabinol (MARINOL) 2.5 mg capsule, Take 1 capsule (2.5 mg total) by mouth in the morning, Disp: 5 capsule, Rfl: 0    ergocalciferol (VITAMIN D2) 50,000 units, Take 1 capsule (50,000 Units total) by mouth once a week, Disp: 90 capsule, Rfl: 0    folic acid (FOLVITE) 1 mg tablet, Take 1 tablet (1 mg total) by mouth in the morning, Disp: 90 tablet, Rfl: 3    gabapentin (NEURONTIN) 300 mg capsule, Take 1 pills in the morning, 1 pill at lunch and 2 pills before bed, Disp: 120 capsule, Rfl: 5    hydrocortisone 1 % ointment, , Disp: , Rfl:     ibuprofen (MOTRIN) 100 mg/5 mL suspension, Take 20 mL (400 mg total) by mouth every 6 (six) hours as needed for moderate pain (Patient not taking:  Reported on 1/31/2025), Disp: 473 mL, Rfl: 0    lidocaine-prilocaine (EMLA) cream, Apply topically as needed for mild pain (Patient not taking: Reported on 12/6/2024), Disp: 30 g, Rfl: 3    losartan (COZAAR) 50 mg tablet, Take 1 tablet (50 mg total) by mouth daily, Disp: 90 tablet, Rfl: 2    magnesium Oxide (MAG-OX) 400 mg TABS, 1 tablet (400 mg total) by Per G Tube route 2 (two) times a day (Patient not taking: Reported on 1/31/2025), Disp: 60 tablet, Rfl: 0    mirtazapine (REMERON) 15 mg tablet, Take 1 tablet (15 mg total) by mouth daily at bedtime Patient is also on a 30 mg tablet, for a total dose of 45 mg, Disp: 30 tablet, Rfl: 0    mirtazapine (REMERON) 30 mg tablet, Take 1 tablet (30 mg total) by mouth daily at bedtime, Disp: 30 tablet, Rfl: 0    nystatin (MYCOSTATIN) 500,000 units/5 mL suspension, Apply 5 mL (500,000 Units total) to the mouth or throat 4 (four) times a day, Disp: 600 mL, Rfl: 3    omeprazole (PriLOSEC) 20 mg delayed release capsule, Take 1 capsule (20 mg total) by mouth daily, Disp: 30 capsule, Rfl: 3    ondansetron (ZOFRAN) 8 mg tablet, Take 1 tablet (8 mg total) by mouth every 8 (eight) hours as needed for nausea or vomiting, Disp: 90 tablet, Rfl: 2    oxyCODONE (ROXICODONE) 5 mg/5 mL solution, Take 5 mL (5 mg total) by mouth every 6 (six) hours as needed for severe pain ((breakthrough pain)) Max Daily Amount: 20 mg, Disp: 473 mL, Rfl: 0    potassium chloride (Klor-Con M20) 20 mEq tablet, Take 1 tablet (20 mEq total) by mouth 2 (two) times a day (Patient not taking: Reported on 1/31/2025), Disp: 90 tablet, Rfl: 1    potassium chloride 10% oral solution, 15 mL (20 mEq total) by Per G Tube route 2 (two) times a day (Patient not taking: Reported on 7/16/2025), Disp: 473 mL, Rfl: 0    prochlorperazine (COMPAZINE) 10 mg tablet, Take 1 tablet (10 mg total) by mouth every 6 (six) hours as needed for nausea or vomiting (Patient not taking: Reported on 7/21/2025), Disp: 90 tablet, Rfl: 2     "pyridoxine (VITAMIN B6) 50 mg tablet, Take 1 tablet (50 mg total) by mouth daily, Disp: 90 tablet, Rfl: 3    Trifluridine-Tipiracil 20-8.19 MG TABS, Take 3 tablets by mouth 2 (two) times a day Days 1-5 and 8-12 every 28 days, Disp: 60 tablet, Rfl: 5    vitamin B-12 (VITAMIN B-12) 1,000 mcg tablet, , Disp: , Rfl:   No current facility-administered medications for this visit.    Facility-Administered Medications Ordered in Other Visits:     alteplase (CATHFLO) injection 2 mg, 2 mg, Intracatheter, Q1MIN PRN, Harika Agostino, DO, 2 mg at 07/23/25 1216    sodium chloride 0.9 % bolus 1,000 mL, 1,000 mL, Intravenous, Once, Harika Agostino, DO, Last Rate: 1,000 mL/hr at 07/23/25 1255, 1,000 mL at 07/23/25 1255    Most Recent Lab Results:   Lab Results   Component Value Date    WBC 2.43 (L) 07/21/2025    NEUTROABS 1.81 (L) 07/21/2025    IRON 46 (L) 07/07/2025    TIBC 254.8 07/07/2025    FERRITIN 446 (H) 07/07/2025    CHOLESTEROL 167 04/24/2024    TRIG 137 04/24/2024    HDL 43 (L) 04/24/2024    LDLCALC 97 04/24/2024    ALT 6 (L) 07/21/2025    AST 14 07/21/2025    ALB 3.1 (L) 07/21/2025    SODIUM 133 (L) 07/21/2025    SODIUM 134 (L) 07/16/2025    K 3.8 07/21/2025    K 4.3 07/16/2025     07/21/2025    BUN 31 (H) 07/21/2025    BUN 30 (H) 07/16/2025    CREATININE 0.88 07/21/2025    CREATININE 0.91 07/16/2025    EGFR 67 07/21/2025    PHOS 2.8 11/01/2024    PHOS 3.3 10/30/2024    TSH 1.58 01/03/2022    GLUCOSE 209 (H) 03/12/2024    POCGLU 89 12/27/2024    GLUF 102 (H) 10/30/2024    GLUF 95 09/13/2024    GLUC 102 07/21/2025    HGBA1C 5.5 02/21/2024    HGBA1C 5.9 (H) 06/13/2023    HGBA1C 6.1 (H) 07/09/2022    CALCIUM 8.8 07/21/2025    FOLATE 14.1 02/28/2025    MG 1.9 07/21/2025       Anthropometric Measurements:   Height: 66\"  Ht Readings from Last 1 Encounters:   07/16/25 5' 5\" (1.651 m)     Wt Readings from Last 20 Encounters:   07/21/25 71.4 kg (157 lb 6.5 oz)   07/16/25 68.9 kg (152 lb)   07/09/25 69.1 kg (152 lb 6.4 oz) "   07/08/25 69.9 kg (154 lb)   06/25/25 70.3 kg (154 lb 15.7 oz)   06/24/25 70.8 kg (156 lb)   06/18/25 70.7 kg (155 lb 12.8 oz)   06/11/25 70.6 kg (155 lb 10.3 oz)   05/27/25 72.5 kg (159 lb 12.8 oz)   05/14/25 72.6 kg (160 lb 0.9 oz)   05/07/25 73 kg (161 lb)   04/30/25 73.5 kg (162 lb)   04/30/25 73 kg (161 lb)   04/16/25 73.5 kg (162 lb)   04/02/25 72.6 kg (160 lb 0.9 oz)   04/01/25 72.6 kg (160 lb)   03/19/25 70.8 kg (156 lb 1.4 oz)   03/18/25 71.2 kg (157 lb)   03/05/25 73.1 kg (161 lb 2.5 oz)   02/28/25 72.6 kg (160 lb)       Weight History:   Usual Weight: 275#  Varian: (2/22/19) 264.6#, (4/2/19) 263.4, (9/16/24) 197.4#, (9/23/24) 194#, (9/30/24) 192.8#, (10/7/24) 190.2#, (10/11/24) 194.6#, (10/14/24) 191#, (10/17/24) 192.8#, (10/21/24) 190#, (10/24/24) 190#, (10/28/24) 189#, (11/11/24) 185#  Home Scale: n/a    Oncology Nutrition-Anthropometrics      Flowsheet Row Nutrition from 7/23/2025 in [facility] Nutrition from 7/9/2025 in [facility]   Patient [age] (years): 70 years 70 years   Patient (female) height (in): 61 in 61 in   Current Weight to be used for anthropometric calculations (lbs) 157.4 lbs 154 lbs   Current Weight to be used for anthropometric calculations (kg) 71.5 kg 70 kg   BMI: 29.7 29.1   IBW female: 105 lbs 105 lbs   IBW (kg) female: 47.7 kg 47.7 kg   IBW % (female) 149.9 % 146.7 %   Adjusted BW (female): 118.1 lbs 117.3 lbs   Adjusted BW kg (female): 53.7 kg 53.3 kg   % weight change after 1 month: 0.9 % -1 %   Weight change after 1 month (lbs) 1.4 lbs -1.6 lbs   % weight change after 3 months: -2.8 % -3.8 %   Weight change after 3 months (lbs) -4.6 lbs -6.1 lbs            Nutrition-Focused Physical Findings: none observed    Food/Nutrition-Related History & Client/Social History:    Current Nutrition Impact Symptoms:  [] Nausea  [x] Reduced Appetite  [] Acid Reflux    [] Vomiting  [] Unintended Wt Loss -stable over  the last month with some fluctuations [] Malabsorption    [] Diarrhea -resolved [] Unintended Wt Gain  [] Dumping Syndrome    [] Constipation -BM every other day [] Thick Mucous/Secretions  [] Abdominal Pain    [x] Dysgeusia (Altered Taste) -starting to improve [x] Xerostomia (Dry Mouth)  [] Gas    [] Dysosmia (Altered Smell)  [] Thrush  [] Difficulty Chewing    [] Oral Mucositis (Sore Mouth)  [x] Fatigue  [] Hyperglycemia   Lab Results   Component Value Date    HGBA1C 5.5 2024      [x] Odynophagia -improving [] Esophagitis  [] Other:    [x] Dysphagia   Follows with SLP  On mechanical soft diet [] Early Satiety  [] No Problems Eating      Food Allergies & Intolerances: no    Current Diet: Soft/Moist and Tube Feeding  Current Nutrition Intake: Unchanged from last visit  Appetite: Fair   Nutrition Route: PO and PEG  Oral Care: brushes daily  Activity level: ADLs.     24 Hr Diet Recall:   Breakfast: oatmeal  Dinner: mashed potatoes     Beverages: water (sips throughout the day)  Supplements:   Ensure Complete (10 oz, 350 kcal, 30 g pro) -2 daily sometimes will drink them, sometimes puts them through PEG. Has been drinking them more lately    EN Recall:  DME: [employer]  Access Type: PEG  Formula: [employer] Peptide 1.5  Method: Bolus  Regimen: 11oz (325 mL) 2  times per day of [employer] 1.5 via PEG (gravity syringe method to administer boluses; 1 container infusing over ~15-20 minutes).  Flush: 60 mL free water flush  pre/post each bolus (120 mL x 2 boluses = 240 mL)  EN providin mL volume (2 cartons/day), 1000 kcal (46% est needs), 48g protein (55% est needs), 456 mL free water  Pt also flushing 1-2 syringes every few hours for added hydration. Pt gets IVF 3x/week.       Oncology Nutrition-Estimated Needs      Flowsheet Row Nutrition from 2025 in [facility] Nutrition from 2025 in [facility]   Weight type used Actual  weight Actual weight   Weight in kilograms (kg) used for estimated needs 73.2 kg 77.3 kg   Energy needs formula:  30-35 kcal/kg 30-35 kcal/kg   Energy needs based on 30 kcal/k kcal 2318 kcal   Energy needs based on 35 kcal/k kcal 2705 kcal   Protein needs formula: 1.2-1.5 g/kg 1.2-1.5 g/kg   Protein needs based on 1.2 g/k g 93 g   Protein needs based on 1.5 g/kg 110 g 116 g   Fluid needs formula: 30-35 mL/kg 30-35 mL/kg   Fluid needs based on 30 mL/kg 2196 mL 2319 mL   Fluid needs in ounces 74 oz 78 oz   Fluid needs based on 35 mL/kg 2562 mL 2706 mL   Fluid needs in ounces 87 oz 91 oz             Discussion & Intervention:   Maira was evaluated today for an RD follow up regarding HNC and enteral nutrition.  Maira has completed tx for HNC and is currently undergoing tx for metastatic colon cancer.  met with Maira today in the infusion center. Her chemo was held today d/t recent fall and ED visit. She received IV hydration. She continues to use her PEG, 2 cartons of marcela BESOS daily.  She is eating small amounts of soft foods like oatmeal and mashed potatoes- usually does this twice/day. She continues to work with SLP. She is drinking 2 Ensure complete shakes daily. Weight has been stable.   Reviewed 24 hour recall, which revealed an adequate enteral intake and PO intake, and discussed ways to increase kcal, protein, and fluid intakes and optimize nutrient intake.  Also reviewed the importance of wt management throughout the tx process and the role of enteral nutrition in managing wt and overall health.  Based on today's assessment, discussion included: MNT for:   fluid, soft  diet,  enteral nutrition, practicing proper oral care, adequate hydration & fluid choices, utilizing oral nutrition supplements, practicing proper feeding tube use & care, adherence to and compliance with enteral nutrition plan of care, and individualized enteral nutrition recommendations & plan:  .   Moving forward,  Maira was encouraged to increase kcal, protein, and fluid intakes via PEG and oral intake as able  Materials Provided: Ensure samples   All questions and concerns addressed during today’s visit.  Maira has RD contact information.    Nutrition Diagnosis:   Inadequate Energy Intake related to physiological causes, disease state and treatment related issues as evidenced by food recall, wt loss and discussion with pt and/or family.  Increased Nutrient Needs (kcal & pro) related to increased demand for nutrients and disease state as evidenced by cancer dx and pt undergoing tx for cancer.  Swallowing Difficulty related to diagnosis/treatment related causes as evidenced by  need for feeding tube, diet restriction per SLP.  Monitoring & Evaluation:   Goals:  weight maintenance/stabilization  adequate nutrition impact symptom management  pt to meet >/=75% estimated nutrition needs daily  achieve tube feeding goal rate    Progress Towards Goals: Progressing    Nutrition Rx & Recommendations:  Diet: enteral nutrition as 1' source of nutrition, soft diet-high calorie high protein  Stay hydrated by sipping fluids of choice/tolerance throughout the day.    Follow proper oral care; Try baking soda/salt water rinse recipe (mix 3/4 tsp salt + 1 tsp baking soda + 1 qt water; rinse with plain water after using) in Eating Hints book (pg 18).  Brush your teeth before/after meals & before bed.  Weigh yourself regularly. If you notice weight loss, make an effort to increase your daily food/calorie intake. If you continue to notice loss after these efforts, reach out to your dietitian to establish a plan to stabilize weight.   Continue with Ensure Plus/Complete 2 times daily. try drinking orally, but OK to put through PEG if tolerated better  Choose liquids with calories: whole milk, Fairlife milk (higher protein/lactose-free milk), chocolate milk, drinkable yogurt, kefir, 100% fruit juice, diluted juice, bone broth (higher protein  broth), creamy soups, sports drinks (Gatorade, Poweraide, Pedialyte, etc.), Italian ice, popsicles, milkshakes, smoothies, oral nutrition supplements (Ensure, Boost, etc.), gelatin/Jello, etc.  Soft/easy to swallow foods that are high in calories and protein: casseroles, chicken/egg/tuna salad with extra garcia to add calories and moisture, oatmeal/cream of wheat made with whole milk, cottage cheese, whole milk Greek yogurt, scrambled eggs with cheese, avocado, macaroni and cheese, mashed potatoes made with butter and whole milk or dry milk powder, ground meat with extra sauce/gravy, canned fruit, peanut butter, cream soups (cream of chicken, cream of mushroom, broccoli cheddar), pudding made with whole milk, custard, ice cream, banana, milkshakes/smoothies, Review page 47 of Eating Hints Book for more ideas.       TF plan -   Continue at least 2 cartons of [employer] 1.5 daily  If PO decreases and weight drops more, consider increasing to goal TF which is:  [employer] 1.5 4 cartons daily (1300 mL total volume, 2000 calories, 96g protein, and 910 mL free water)  Contact RD for substitutions  [facility] when you have 10 days left of TF supplies: [phone], option 3 (customer support).      Follow Up Plan: pending infusion schedule   Recommend Referral to Other Providers: none at this time

## 2025-07-23 NOTE — PLAN OF CARE
Problem: Potential for Falls  Goal: Patient will remain free of falls  Description: INTERVENTIONS:  - Educate patient/family on patient safety including physical limitations  - Instruct patient to call for assistance with activity   - Consider consulting OT/PT to assist with strengthening/mobility based on AM PAC & JH-HLM score  - Consult OT/PT to assist with strengthening/mobility   - Keep Call bell within reach  - Keep bed low and locked with side rails adjusted as appropriate  - Keep care items and personal belongings within reach  - Initiate and maintain comfort rounds  - Make Fall Risk Sign visible to staff  - Offer Toileting every t Hours, in advance of need  - Initiate/Maintain alarm  - Obtain necessary fall risk management equipment: t  - Apply yellow socks and bracelet for high fall risk patients  - Consider moving patient to room near nurses station  Outcome: Completed

## 2025-07-23 NOTE — PROGRESS NOTES
Pt offers no new complaints today, no blood return on port access today, pt required half hour of TPA, central labs drawn per orders, tolerated hydration without complications, confirmed appt back on Monday 7-28-25 11:30, AVS declined. Also discussed with Traci Coronel RN that pt will not resume Avastin until seen by Dr. Medina next tue 7-29, will discuss at that appt when to resume Avastin.

## 2025-07-24 ENCOUNTER — APPOINTMENT (OUTPATIENT)
Dept: SPEECH THERAPY | Facility: CLINIC | Age: 70
End: 2025-07-24
Attending: INTERNAL MEDICINE
Payer: MEDICARE

## 2025-07-24 ENCOUNTER — HOSPITAL ENCOUNTER (EMERGENCY)
Facility: HOSPITAL | Age: 70
Discharge: HOME | End: 2025-07-24
Attending: EMERGENCY MEDICINE
Payer: MEDICARE

## 2025-07-24 VITALS
WEIGHT: 154.32 LBS | DIASTOLIC BLOOD PRESSURE: 56 MMHG | RESPIRATION RATE: 18 BRPM | BODY MASS INDEX: 25.68 KG/M2 | OXYGEN SATURATION: 100 % | SYSTOLIC BLOOD PRESSURE: 120 MMHG | HEART RATE: 120 BPM | TEMPERATURE: 98.4 F

## 2025-07-24 DIAGNOSIS — R13.12 DYSPHAGIA, OROPHARYNGEAL PHASE: Primary | ICD-10-CM

## 2025-07-24 DIAGNOSIS — C09.9 RIGHT TONSILLAR SQUAMOUS CELL CARCINOMA (HCC): ICD-10-CM

## 2025-07-24 DIAGNOSIS — Z90.89 STATUS POST TONSILLECTOMY: ICD-10-CM

## 2025-07-24 DIAGNOSIS — Z51.89 ENCOUNTER FOR WOUND CARE: Primary | ICD-10-CM

## 2025-07-24 PROCEDURE — 99282 EMERGENCY DEPT VISIT SF MDM: CPT

## 2025-07-24 PROCEDURE — 99284 EMERGENCY DEPT VISIT MOD MDM: CPT | Performed by: EMERGENCY MEDICINE

## 2025-07-24 RX ORDER — MUPIROCIN 2 %
OINTMENT (GRAM) TOPICAL DAILY
Status: DISCONTINUED | OUTPATIENT
Start: 2025-07-24 | End: 2025-07-24 | Stop reason: HOSPADM

## 2025-07-24 RX ORDER — MUPIROCIN 2 %
OINTMENT (GRAM) TOPICAL
Qty: 15 G | Refills: 0 | Status: SHIPPED | OUTPATIENT
Start: 2025-07-24

## 2025-07-24 RX ADMIN — MUPIROCIN: 20 OINTMENT TOPICAL at 11:23

## 2025-07-24 NOTE — DISCHARGE INSTRUCTIONS
Wound care instructions, once or twice daily.    Gently rinse the skin with water and you can use a mild skin clean to get the area clean.  Apply antibiotic ointment to the open skin for the next 5 days.  Then as needed if the wound opens up more.   Apply barrier cream to the skin under the disk.  Put the gauze over the ointments between the disk an your skin.    Call your GI doctor or return to the ED if you feel it is getting worse.

## 2025-07-24 NOTE — ED PROVIDER NOTES
ED Disposition       None          Assessment & Plan       Medical Decision Making  I irrigated the wound, without the dried and foul smelling exudates, it is granulating, no abscess, no cellulitis.  I loosened the retention disk a few millimeters because it was under some slight tension        ED Course as of 07/24/25 1411   Thu Jul 24, 2025   1029 GI fellow Dr. Freedman reviewed images, and agrees topical barrier cream, topical abx, and gauze dressing, with outpatient follow up is warranted at this time.     1057 She is asking for        Medications - No data to display    ED Risk Strat Scores                    No data recorded                            History of Present Illness       Chief Complaint   Patient presents with   • Wound Check     Concerned for wound around g tube. Has been there for about 1 week. No concerns with tube itself.        Past Medical History[1]   Past Surgical History[2]   Family History[3]   Social History[4]   E-Cigarette/Vaping   • E-Cigarette Use Never User       E-Cigarette/Vaping Substances   • Nicotine No    • THC No    • CBD No    • Flavoring No    • Other No    • Unknown No       I have reviewed and agree with the history as documented.     69 yo F presents with concern for a wound at the gastrostomy site, no fever, no chills. She thinks it was last exchanged about 1 year.  It is working well.  The wound corresponds to the points of pressure from the retention disk.        Wound Check         Review of Systems        Objective       ED Triage Vitals [07/24/25 0922]   Temperature Pulse Blood Pressure Respirations SpO2 Patient Position - Orthostatic VS   98.4 °F (36.9 °C) (!) 120 120/56 18 100 % --      Temp src Heart Rate Source BP Location FiO2 (%) Pain Score    -- -- -- -- --      Vitals      Date and Time Temp Pulse SpO2 Resp BP Pain Score FACES Pain Rating User   07/24/25 0922 98.4 °F (36.9 °C) 120 100 % 18 120/56 -- -- NIKKY            Physical Exam  Vitals and nursing note  reviewed.   Abdominal:      Tenderness: There is abdominal tenderness (focal to the ostomy site).     Skin:     Capillary Refill: Capillary refill takes less than 2 seconds.      Findings: Wound (pressure wound from the points of contact from the retention disk, superficial, tender, exudates at the site are foul smelling, but no abscess or surrounding cellulitis.) present.     Neurological:      Mental Status: She is alert.         Results Reviewed       None            No orders to display       Procedures    ED Medication and Procedure Management   Prior to Admission Medications   Prescriptions Last Dose Informant Patient Reported? Taking?   Trifluridine-Tipiracil 20-8.19 MG TABS  Self No No   Sig: Take 3 tablets by mouth 2 (two) times a day Days 1-5 and 8-12 every 28 days   acetaminophen (TYLENOL) 160 mg/5 mL liquid  Self No No   Sig: Take 20 mL (640 mg total) by mouth every 6 (six) hours as needed for mild pain for up to 10 days   Patient taking differently: Take 640 mg by mouth if needed for moderate pain or headaches Take 20 mL (640 mg total) by mouth every 6 (six) hours as needed for mild pain for up to 10 days   anastrozole (ARIMIDEX) 1 mg tablet  Self No No   Sig: Take 1 tablet (1 mg total) by mouth daily   atorvastatin (LIPITOR) 40 mg tablet  Self No No   Sig: Take 1 tablet (40 mg total) by mouth daily at bedtime   cholestyramine (QUESTRAN) 4 g packet  Self No No   Sig: Take 1 packet (4 g total) by mouth 3 (three) times a day with meals   Patient not taking: Reported on 7/16/2025   diphenhydramine, lidocaine, Al/Mg hydroxide, simethicone (Magic Mouthwash) SUSP  Self No No   Sig: Swish and swallow 10 mL every 4 (four) hours as needed for mouth pain or discomfort   Patient not taking: Reported on 7/16/2025   docusate sodium (COLACE) 50 mg capsule  Self No No   Sig: Take 1 capsule (50 mg total) by mouth 2 (two) times a day   dronabinol (MARINOL) 2.5 mg capsule  Self No No   Sig: Take 1 capsule (2.5 mg total)  by mouth in the morning   ergocalciferol (VITAMIN D2) 50,000 units  Self No No   Sig: Take 1 capsule (50,000 Units total) by mouth once a week   folic acid (FOLVITE) 1 mg tablet  Self No No   Sig: Take 1 tablet (1 mg total) by mouth in the morning   gabapentin (NEURONTIN) 300 mg capsule  Self No No   Sig: Take 1 pills in the morning, 1 pill at lunch and 2 pills before bed   hydrocortisone 1 % ointment  Self Yes No   Patient not taking: Reported on 2024   ibuprofen (MOTRIN) 100 mg/5 mL suspension   No No   Sig: Take 20 mL (400 mg total) by mouth every 6 (six) hours as needed for moderate pain   Patient not taking: Reported on 2025   lidocaine-prilocaine (EMLA) cream  Self No No   Sig: Apply topically as needed for mild pain   Patient not taking: Reported on 2024   losartan (COZAAR) 50 mg tablet  Self No No   Sig: Take 1 tablet (50 mg total) by mouth daily   magnesium Oxide (MAG-OX) 400 mg TABS  Self No No   Si tablet (400 mg total) by Per G Tube route 2 (two) times a day   Patient not taking: Reported on 2025   mirtazapine (REMERON) 15 mg tablet  Self No No   Sig: Take 1 tablet (15 mg total) by mouth daily at bedtime Patient is also on a 30 mg tablet, for a total dose of 45 mg   mirtazapine (REMERON) 30 mg tablet  Self No No   Sig: Take 1 tablet (30 mg total) by mouth daily at bedtime   nystatin (MYCOSTATIN) 500,000 units/5 mL suspension  Self No No   Sig: Apply 5 mL (500,000 Units total) to the mouth or throat 4 (four) times a day   omeprazole (PriLOSEC) 20 mg delayed release capsule  Self No No   Sig: Take 1 capsule (20 mg total) by mouth daily   ondansetron (ZOFRAN) 8 mg tablet  Self No No   Sig: Take 1 tablet (8 mg total) by mouth every 8 (eight) hours as needed for nausea or vomiting   oxyCODONE (ROXICODONE) 5 mg/5 mL solution  Self No No   Sig: Take 5 mL (5 mg total) by mouth every 6 (six) hours as needed for severe pain ((breakthrough pain)) Max Daily Amount: 20 mg   potassium chloride  (Klor-Con M20) 20 mEq tablet  Self No No   Sig: Take 1 tablet (20 mEq total) by mouth 2 (two) times a day   Patient not taking: Reported on 1/31/2025   potassium chloride 10% oral solution  Self No No   Sig: 15 mL (20 mEq total) by Per G Tube route 2 (two) times a day   Patient not taking: Reported on 7/16/2025   prochlorperazine (COMPAZINE) 10 mg tablet  Self No No   Sig: Take 1 tablet (10 mg total) by mouth every 6 (six) hours as needed for nausea or vomiting   Patient not taking: Reported on 7/21/2025   pyridoxine (VITAMIN B6) 50 mg tablet  Self No No   Sig: Take 1 tablet (50 mg total) by mouth daily   vitamin B-12 (VITAMIN B-12) 1,000 mcg tablet  Self Yes No      Facility-Administered Medications: None     Patient's Medications   Discharge Prescriptions    No medications on file     No discharge procedures on file.  ED SEPSIS DOCUMENTATION                [1]  Past Medical History:  Diagnosis Date   • Anemia    • Arthritis    • Body mass index (BMI) 40.0-44.9, adult (HCC) 9/22/2023   • Breast cancer (HCC) 12/17/2018   • Cancer (HCC)     right breast, colon, liver, right tonsil   • Cervical lymphadenopathy 3/21/2023    CT neck on 3/30/2023- Large right level 2A lymphadenopathy as described above and suspicious for neoplasm.  Correlation with histopathology is recommended.  Mild asymmetric prominence of the right palatine tonsil with otherwise no definitive nodular enhancing lesions identified along the course of the aerodigestive tract.     5/26/23- FNA of this node was nonrevealing for tissue etiology, but it d   • Encounter for screening for HIV 07/07/2022   • Follow-up examination 04/04/2023   • GERD (gastroesophageal reflux disease)    • Hyperlipidemia    • Hypertension    • Obesity    • Stroke (HCC)     TIA    • Stroke (HCC)     TIA    • Transaminitis 09/22/2021   • Vasomotor rhinitis 8/9/2018    Refilled flonase today. Stopped taking since January 2022   [2]  Past Surgical History:  Procedure Laterality Date    • BREAST BIOPSY Right 2018    us guided bx cancer   • BREAST SURGERY     • COLONOSCOPY  2023   • ESOPHAGOSCOPY N/A 2023    Procedure: ESOPHAGOSCOPY;  Surgeon: David Chapa MD;  [facility];  Service: ENT   • HEMICOLOECTOMY W/ ANASTOMOSIS Right 2024    Procedure: RIGHT COLECTOMY WITH ROBOTICS;  Surgeon: Vickie Israel MD;  [facility];  Service: Surgical Oncology   • IR BIOPSY LIVER MASS  2023   • IR CRYOABLATION  2024   • IR GASTROSTOMY (G) TUBE CHECK/CHANGE/REINSERTION/UPSIZE  2025   • IR GASTROSTOMY TUBE PLACEMENT  10/02/2024   • IR PORT CHECK  2024   • IR PORT PLACEMENT  2023   • IR PORT STRIPPING  2024   • LAPAROTOMY N/A 2024    Procedure: LAPAROTOMY EXPLORATORY W/ ROBOTICS;  Surgeon: Vickie Israel MD;  [facility];  Service: Surgical Oncology   • CT BIOPSY NASOPHARYNX VISIBLE LESION SIMPLE N/A 10/09/2024    Procedure: NASOPHARYNGOSCOPY with biospy;  Surgeon: Juanito Matthew MD;  [facility];  Service: ENT   [facility] W/CELL WASHG SPX N/A 2023    Procedure: BRONCHOSCOPY;  Surgeon: David Chapa MD;  [facility];  Service: ENT   • CT LARYNGOSCOPY W/BIOPSY MICROSCOPE/TELESCOPE N/A 2023    Procedure: MICRODIRECT LARYNGOSCOPY WITH BIOPSY;  Surgeon: David Chapa MD;  [facility];  Service: ENT   • CT LARYNGOSCOPY W/WO TRACHEOSCOPY DX EXCEPT  N/A 10/09/2024    Procedure: DIRECT LARYNGOSCOPY;  Surgeon: Juanito Matthew MD;  [facility];  Service: ENT   • CT MASTECTOMY PARTIAL W/AXILLARY LYMPHADENECTOMY Right 2018    Procedure: ULTRASOUND LOCALIZED PARTIAL MASTECTOMY W/SENTINEL NODE BIOPSY POSS AXILLARY DISSECTION;  Surgeon: Zahida Coronado MD;  Location: [facility];  Service: General   • CT TONSILLECTOMY PRIMARY/SECONDARY AGE 13/> N/A 10/09/2024    Procedure: TONSILLECTOMY;  Surgeon: Juanito Matthew MD;   [facility];  Service: ENT   • TUBAL LIGATION     • US GUIDED BREAST BIOPSY RIGHT COMPLETE Right 11/23/2018   • US GUIDED INJECTION FOR RESEARCH STUDY  12/20/2018   • US GUIDED INJECTION FOR RESEARCH STUDY  12/07/2018   • US GUIDED INJECTION FOR RESEARCH STUDY  05/03/2019   • US GUIDED LYMPH NODE BIOPSY RIGHT  05/26/2023   [3]  Family History  Problem Relation Name Age of Onset   • Colon cancer Mother  58   • Hypertension Mother     • Pancreatic cancer Father  60   • Hypertension Daughter     • Hypertension Son     [4]  Social History  Tobacco Use   • Smoking status: Never     Passive exposure: Never   • Smokeless tobacco: Never   • Tobacco comments:     NO TOBACCO USE   Vaping Use   • Vaping status: Never Used   Substance Use Topics   • Alcohol use: Never   • Drug use: Never      Heriberto Talbert MD  07/24/25 1042

## 2025-07-25 ENCOUNTER — TELEPHONE (OUTPATIENT)
Age: 70
End: 2025-07-25

## 2025-07-28 ENCOUNTER — APPOINTMENT (OUTPATIENT)
Dept: SPEECH THERAPY | Facility: CLINIC | Age: 70
End: 2025-07-28
Attending: INTERNAL MEDICINE
Payer: MEDICARE

## 2025-07-28 ENCOUNTER — HOSPITAL ENCOUNTER (OUTPATIENT)
Dept: INFUSION CENTER | Facility: CLINIC | Age: 70
Discharge: HOME | End: 2025-07-28
Payer: MEDICARE

## 2025-07-28 VITALS
RESPIRATION RATE: 18 BRPM | DIASTOLIC BLOOD PRESSURE: 76 MMHG | TEMPERATURE: 97.7 F | HEART RATE: 105 BPM | SYSTOLIC BLOOD PRESSURE: 109 MMHG

## 2025-07-28 DIAGNOSIS — Z95.828 PORT-A-CATH IN PLACE: ICD-10-CM

## 2025-07-28 DIAGNOSIS — C78.7 METASTATIC COLON CANCER TO LIVER (HCC): Primary | ICD-10-CM

## 2025-07-28 DIAGNOSIS — C18.9 METASTATIC COLON CANCER TO LIVER (HCC): Primary | ICD-10-CM

## 2025-07-28 DIAGNOSIS — E86.0 DEHYDRATION: Primary | ICD-10-CM

## 2025-07-28 LAB
ALBUMIN SERPL BCG-MCNC: 3.3 G/DL (ref 3.5–5)
ALP SERPL-CCNC: 90 U/L (ref 34–104)
ALT SERPL W P-5'-P-CCNC: 8 U/L (ref 7–52)
ANION GAP SERPL CALCULATED.3IONS-SCNC: 7 MMOL/L (ref 4–13)
ANISOCYTOSIS BLD QL SMEAR: PRESENT
AST SERPL W P-5'-P-CCNC: 16 U/L (ref 13–39)
BASOPHILS # BLD MANUAL: 0 THOUSAND/UL (ref 0–0.1)
BASOPHILS NFR MAR MANUAL: 0 % (ref 0–1)
BILIRUB SERPL-MCNC: 0.35 MG/DL (ref 0.2–1)
BUN SERPL-MCNC: 27 MG/DL (ref 5–25)
CALCIUM ALBUM COR SERPL-MCNC: 9.8 MG/DL (ref 8.3–10.1)
CALCIUM SERPL-MCNC: 9.2 MG/DL (ref 8.4–10.2)
CHLORIDE SERPL-SCNC: 100 MMOL/L (ref 96–108)
CO2 SERPL-SCNC: 28 MMOL/L (ref 21–32)
CREAT SERPL-MCNC: 0.93 MG/DL (ref 0.6–1.3)
EOSINOPHIL # BLD MANUAL: 0.01 THOUSAND/UL (ref 0–0.4)
EOSINOPHIL NFR BLD MANUAL: 1 % (ref 0–6)
ERYTHROCYTE [DISTWIDTH] IN BLOOD BY AUTOMATED COUNT: 14.7 % (ref 11.6–15.1)
GFR SERPL CREATININE-BSD FRML MDRD: 62 ML/MIN/1.73SQ M
GLUCOSE SERPL-MCNC: 99 MG/DL (ref 65–140)
HCT VFR BLD AUTO: 20.3 % (ref 34.8–46.1)
HGB BLD-MCNC: 6.8 G/DL (ref 11.5–15.4)
LYMPHOCYTES # BLD AUTO: 0.29 THOUSAND/UL (ref 0.6–4.47)
LYMPHOCYTES # BLD AUTO: 39 % (ref 14–44)
MAGNESIUM SERPL-MCNC: 2 MG/DL (ref 1.9–2.7)
MCH RBC QN AUTO: 30.9 PG (ref 26.8–34.3)
MCHC RBC AUTO-ENTMCNC: 33.5 G/DL (ref 31.4–37.4)
MCV RBC AUTO: 92 FL (ref 82–98)
MICROCYTES BLD QL AUTO: PRESENT
MONOCYTES # BLD AUTO: 0.02 THOUSAND/UL (ref 0–1.22)
MONOCYTES NFR BLD: 3 % (ref 4–12)
NEUTROPHILS # BLD MANUAL: 0.4 THOUSAND/UL (ref 1.85–7.62)
NEUTS SEG NFR BLD AUTO: 56 % (ref 43–75)
PLASMA CELLS NFR BLD: 1 % (ref 0–0)
PLATELET # BLD AUTO: 178 THOUSANDS/UL (ref 149–390)
PLATELET BLD QL SMEAR: ADEQUATE
PMV BLD AUTO: 10.5 FL (ref 8.9–12.7)
POTASSIUM SERPL-SCNC: 4.2 MMOL/L (ref 3.5–5.3)
PROT SERPL-MCNC: 7.3 G/DL (ref 6.4–8.4)
RBC # BLD AUTO: 2.2 MILLION/UL (ref 3.81–5.12)
RBC MORPH BLD: PRESENT
SODIUM SERPL-SCNC: 135 MMOL/L (ref 135–147)
WBC # BLD AUTO: 0.72 THOUSAND/UL (ref 4.31–10.16)

## 2025-07-28 PROCEDURE — 85027 COMPLETE CBC AUTOMATED: CPT | Performed by: INTERNAL MEDICINE

## 2025-07-28 PROCEDURE — 96374 THER/PROPH/DIAG INJ IV PUSH: CPT

## 2025-07-28 PROCEDURE — 80053 COMPREHEN METABOLIC PANEL: CPT | Performed by: INTERNAL MEDICINE

## 2025-07-28 PROCEDURE — 36593 DECLOT VASCULAR DEVICE: CPT

## 2025-07-28 PROCEDURE — 85007 BL SMEAR W/DIFF WBC COUNT: CPT | Performed by: INTERNAL MEDICINE

## 2025-07-28 PROCEDURE — 96361 HYDRATE IV INFUSION ADD-ON: CPT

## 2025-07-28 PROCEDURE — 83735 ASSAY OF MAGNESIUM: CPT | Performed by: INTERNAL MEDICINE

## 2025-07-28 RX ADMIN — ALTEPLASE 2 MG: 2.2 INJECTION, POWDER, LYOPHILIZED, FOR SOLUTION INTRAVENOUS at 11:48

## 2025-07-28 RX ADMIN — SODIUM CHLORIDE 1000 ML: 0.9 INJECTION, SOLUTION INTRAVENOUS at 13:39

## 2025-07-28 RX ADMIN — ONDANSETRON 8 MG: 2 INJECTION INTRAMUSCULAR; INTRAVENOUS at 13:39

## 2025-07-29 ENCOUNTER — DOCUMENTATION (OUTPATIENT)
Dept: HEMATOLOGY ONCOLOGY | Facility: CLINIC | Age: 70
End: 2025-07-29

## 2025-07-29 ENCOUNTER — RESULTS FOLLOW-UP (OUTPATIENT)
Dept: HEMATOLOGY ONCOLOGY | Facility: CLINIC | Age: 70
End: 2025-07-29

## 2025-07-29 ENCOUNTER — APPOINTMENT (OUTPATIENT)
Dept: LAB | Facility: CLINIC | Age: 70
End: 2025-07-29
Payer: MEDICARE

## 2025-07-29 ENCOUNTER — OFFICE VISIT (OUTPATIENT)
Dept: HEMATOLOGY ONCOLOGY | Facility: CLINIC | Age: 70
End: 2025-07-29
Payer: MEDICARE

## 2025-07-29 ENCOUNTER — TELEPHONE (OUTPATIENT)
Dept: HEMATOLOGY ONCOLOGY | Facility: CLINIC | Age: 70
End: 2025-07-29

## 2025-07-29 ENCOUNTER — LAB REQUISITION (OUTPATIENT)
Dept: LAB | Facility: HOSPITAL | Age: 70
End: 2025-07-29
Payer: MEDICARE

## 2025-07-29 VITALS
SYSTOLIC BLOOD PRESSURE: 112 MMHG | HEART RATE: 100 BPM | BODY MASS INDEX: 25.66 KG/M2 | TEMPERATURE: 98 F | OXYGEN SATURATION: 95 % | WEIGHT: 154 LBS | HEIGHT: 65 IN | RESPIRATION RATE: 16 BRPM | DIASTOLIC BLOOD PRESSURE: 70 MMHG

## 2025-07-29 DIAGNOSIS — C78.7 METASTATIC COLON CANCER TO LIVER (HCC): ICD-10-CM

## 2025-07-29 DIAGNOSIS — C78.7 METASTATIC COLON CANCER TO LIVER (HCC): Primary | ICD-10-CM

## 2025-07-29 DIAGNOSIS — T45.1X5A ANEMIA ASSOCIATED WITH CHEMOTHERAPY: ICD-10-CM

## 2025-07-29 DIAGNOSIS — C18.9 METASTATIC COLON CANCER TO LIVER (HCC): Primary | ICD-10-CM

## 2025-07-29 DIAGNOSIS — D64.81 ANEMIA ASSOCIATED WITH CHEMOTHERAPY: ICD-10-CM

## 2025-07-29 DIAGNOSIS — D64.81 ANEMIA ASSOCIATED WITH CHEMOTHERAPY: Primary | ICD-10-CM

## 2025-07-29 DIAGNOSIS — T45.1X5A ANEMIA ASSOCIATED WITH CHEMOTHERAPY: Primary | ICD-10-CM

## 2025-07-29 DIAGNOSIS — D64.81 ANEMIA DUE TO ANTINEOPLASTIC CHEMOTHERAPY (CODE): ICD-10-CM

## 2025-07-29 DIAGNOSIS — C18.9 METASTATIC COLON CANCER TO LIVER (HCC): ICD-10-CM

## 2025-07-29 DIAGNOSIS — E86.0 DEHYDRATION: Primary | ICD-10-CM

## 2025-07-29 LAB
ABO GROUP BLD: NORMAL
ABO GROUP BLD: NORMAL
BLD GP AB SCN SERPL QL: NEGATIVE
BLD GP AB SCN SERPL QL: NEGATIVE
RH BLD: POSITIVE
RH BLD: POSITIVE
SPECIMEN EXPIRATION DATE: NORMAL
SPECIMEN EXPIRATION DATE: NORMAL

## 2025-07-29 PROCEDURE — 86850 RBC ANTIBODY SCREEN: CPT

## 2025-07-29 PROCEDURE — 99215 OFFICE O/P EST HI 40 MIN: CPT | Performed by: INTERNAL MEDICINE

## 2025-07-29 PROCEDURE — 86923 COMPATIBILITY TEST ELECTRIC: CPT

## 2025-07-29 PROCEDURE — 36415 COLL VENOUS BLD VENIPUNCTURE: CPT

## 2025-07-29 PROCEDURE — 86900 BLOOD TYPING SEROLOGIC ABO: CPT | Performed by: INTERNAL MEDICINE

## 2025-07-29 PROCEDURE — G2211 COMPLEX E/M VISIT ADD ON: HCPCS | Performed by: INTERNAL MEDICINE

## 2025-07-29 PROCEDURE — 86901 BLOOD TYPING SEROLOGIC RH(D): CPT | Performed by: INTERNAL MEDICINE

## 2025-07-29 PROCEDURE — 86850 RBC ANTIBODY SCREEN: CPT | Performed by: INTERNAL MEDICINE

## 2025-07-29 PROCEDURE — 86901 BLOOD TYPING SEROLOGIC RH(D): CPT

## 2025-07-29 PROCEDURE — 86900 BLOOD TYPING SEROLOGIC ABO: CPT

## 2025-07-29 RX ORDER — SODIUM CHLORIDE 9 MG/ML
20 INJECTION, SOLUTION INTRAVENOUS ONCE
Status: CANCELLED | OUTPATIENT
Start: 2025-07-30

## 2025-07-29 RX ORDER — DIPHENHYDRAMINE HCL 25 MG
25 TABLET ORAL ONCE
OUTPATIENT
Start: 2025-07-29

## 2025-07-29 RX ORDER — ACETAMINOPHEN 325 MG/1
650 TABLET ORAL ONCE
OUTPATIENT
Start: 2025-07-29

## 2025-07-29 RX ORDER — SODIUM CHLORIDE 9 MG/ML
20 INJECTION, SOLUTION INTRAVENOUS ONCE
OUTPATIENT
Start: 2025-07-29

## 2025-07-29 RX ORDER — ACETAMINOPHEN 325 MG/1
650 TABLET ORAL ONCE
Status: CANCELLED | OUTPATIENT
Start: 2025-07-30

## 2025-07-29 RX ORDER — DIPHENHYDRAMINE HCL 25 MG
25 TABLET ORAL ONCE
Status: CANCELLED | OUTPATIENT
Start: 2025-07-30

## 2025-07-30 ENCOUNTER — HOSPITAL ENCOUNTER (OUTPATIENT)
Dept: INFUSION CENTER | Facility: CLINIC | Age: 70
Discharge: HOME | End: 2025-07-30
Attending: INTERNAL MEDICINE
Payer: MEDICARE

## 2025-07-30 ENCOUNTER — DOCUMENTATION (OUTPATIENT)
Dept: NUTRITION | Facility: CLINIC | Age: 70
End: 2025-07-30

## 2025-07-30 VITALS
DIASTOLIC BLOOD PRESSURE: 78 MMHG | BODY MASS INDEX: 25.53 KG/M2 | RESPIRATION RATE: 18 BRPM | WEIGHT: 153.44 LBS | HEART RATE: 76 BPM | TEMPERATURE: 97.6 F | SYSTOLIC BLOOD PRESSURE: 115 MMHG

## 2025-07-30 DIAGNOSIS — C18.9 METASTATIC COLON CANCER TO LIVER (HCC): ICD-10-CM

## 2025-07-30 DIAGNOSIS — D64.81 ANTINEOPLASTIC CHEMOTHERAPY INDUCED ANEMIA: Primary | ICD-10-CM

## 2025-07-30 DIAGNOSIS — T45.1X5A ANTINEOPLASTIC CHEMOTHERAPY INDUCED ANEMIA: Primary | ICD-10-CM

## 2025-07-30 DIAGNOSIS — C78.7 METASTATIC COLON CANCER TO LIVER (HCC): ICD-10-CM

## 2025-07-30 PROCEDURE — 36430 TRANSFUSION BLD/BLD COMPNT: CPT

## 2025-07-30 PROCEDURE — 96413 CHEMO IV INFUSION 1 HR: CPT

## 2025-07-30 PROCEDURE — 96372 THER/PROPH/DIAG INJ SC/IM: CPT

## 2025-07-30 PROCEDURE — P9040 RBC LEUKOREDUCED IRRADIATED: HCPCS

## 2025-07-30 RX ORDER — ACETAMINOPHEN 325 MG/1
650 TABLET ORAL ONCE
Status: COMPLETED | OUTPATIENT
Start: 2025-07-30 | End: 2025-07-30

## 2025-07-30 RX ORDER — SODIUM CHLORIDE 9 MG/ML
20 INJECTION, SOLUTION INTRAVENOUS ONCE
Status: DISCONTINUED | OUTPATIENT
Start: 2025-07-30 | End: 2025-08-02 | Stop reason: HOSPADM

## 2025-07-30 RX ORDER — SODIUM CHLORIDE 9 MG/ML
20 INJECTION, SOLUTION INTRAVENOUS ONCE
Status: COMPLETED | OUTPATIENT
Start: 2025-07-30 | End: 2025-07-30

## 2025-07-30 RX ORDER — DIPHENHYDRAMINE HCL 25 MG
25 TABLET ORAL ONCE
Status: COMPLETED | OUTPATIENT
Start: 2025-07-30 | End: 2025-07-30

## 2025-07-30 RX ADMIN — PEGFILGRASTIM 6 MG: 6 INJECTION SUBCUTANEOUS at 14:40

## 2025-07-30 RX ADMIN — DIPHENHYDRAMINE HYDROCHLORIDE 25 MG: 25 TABLET ORAL at 14:19

## 2025-07-30 RX ADMIN — ACETAMINOPHEN 650 MG: 325 TABLET, FILM COATED ORAL at 14:19

## 2025-07-30 RX ADMIN — SODIUM CHLORIDE 20 ML/HR: 0.9 INJECTION, SOLUTION INTRAVENOUS at 12:30

## 2025-07-30 RX ADMIN — BEVACIZUMAB 362.5 MG: 400 INJECTION, SOLUTION INTRAVENOUS at 13:30

## 2025-07-31 ENCOUNTER — OFFICE VISIT (OUTPATIENT)
Age: 70
End: 2025-07-31

## 2025-07-31 ENCOUNTER — APPOINTMENT (OUTPATIENT)
Dept: SPEECH THERAPY | Facility: CLINIC | Age: 70
End: 2025-07-31
Attending: INTERNAL MEDICINE
Payer: MEDICARE

## 2025-07-31 VITALS
OXYGEN SATURATION: 97 % | HEIGHT: 66 IN | SYSTOLIC BLOOD PRESSURE: 114 MMHG | BODY MASS INDEX: 24.53 KG/M2 | DIASTOLIC BLOOD PRESSURE: 70 MMHG | HEART RATE: 83 BPM | WEIGHT: 152.6 LBS | TEMPERATURE: 98.6 F

## 2025-07-31 DIAGNOSIS — Z90.89 STATUS POST TONSILLECTOMY: ICD-10-CM

## 2025-07-31 DIAGNOSIS — K94.29 GASTROSTOMY TUBE SKIN BREAKDOWN (HCC): Primary | ICD-10-CM

## 2025-07-31 DIAGNOSIS — R13.12 DYSPHAGIA, OROPHARYNGEAL PHASE: Primary | ICD-10-CM

## 2025-07-31 DIAGNOSIS — C09.9 RIGHT TONSILLAR SQUAMOUS CELL CARCINOMA (HCC): ICD-10-CM

## 2025-08-01 ENCOUNTER — HOSPITAL ENCOUNTER (OUTPATIENT)
Dept: INFUSION CENTER | Facility: CLINIC | Age: 70
Discharge: HOME | End: 2025-08-01
Attending: INTERNAL MEDICINE
Payer: MEDICARE

## 2025-08-01 DIAGNOSIS — E86.0 DEHYDRATION: Primary | ICD-10-CM

## 2025-08-01 LAB
ALBUMIN SERPL BCG-MCNC: 3.6 G/DL (ref 3.5–5)
ALP SERPL-CCNC: 104 U/L (ref 34–104)
ALT SERPL W P-5'-P-CCNC: 7 U/L (ref 7–52)
ANION GAP SERPL CALCULATED.3IONS-SCNC: 7 MMOL/L (ref 4–13)
ANISOCYTOSIS BLD QL SMEAR: PRESENT
AST SERPL W P-5'-P-CCNC: 17 U/L (ref 13–39)
BASOPHILS # BLD MANUAL: 0 THOUSAND/UL (ref 0–0.1)
BASOPHILS NFR MAR MANUAL: 0 % (ref 0–1)
BILIRUB SERPL-MCNC: 0.49 MG/DL (ref 0.2–1)
BUN SERPL-MCNC: 23 MG/DL (ref 5–25)
CALCIUM SERPL-MCNC: 9.5 MG/DL (ref 8.4–10.2)
CHLORIDE SERPL-SCNC: 99 MMOL/L (ref 96–108)
CO2 SERPL-SCNC: 28 MMOL/L (ref 21–32)
CREAT SERPL-MCNC: 0.95 MG/DL (ref 0.6–1.3)
EOSINOPHIL # BLD MANUAL: 0 THOUSAND/UL (ref 0–0.4)
EOSINOPHIL NFR BLD MANUAL: 0 % (ref 0–6)
ERYTHROCYTE [DISTWIDTH] IN BLOOD BY AUTOMATED COUNT: 15.3 % (ref 11.6–15.1)
GFR SERPL CREATININE-BSD FRML MDRD: 61 ML/MIN/1.73SQ M
GIANT PLATELETS BLD QL SMEAR: PRESENT
GLUCOSE SERPL-MCNC: 98 MG/DL (ref 65–140)
HCT VFR BLD AUTO: 27.1 % (ref 34.8–46.1)
HGB BLD-MCNC: 9.1 G/DL (ref 11.5–15.4)
LYMPHOCYTES # BLD AUTO: 0.7 THOUSAND/UL (ref 0.6–4.47)
LYMPHOCYTES # BLD AUTO: 12 % (ref 14–44)
MACROCYTES BLD QL AUTO: PRESENT
MAGNESIUM SERPL-MCNC: 2 MG/DL (ref 1.9–2.7)
MCH RBC QN AUTO: 31 PG (ref 26.8–34.3)
MCHC RBC AUTO-ENTMCNC: 33.6 G/DL (ref 31.4–37.4)
MCV RBC AUTO: 92 FL (ref 82–98)
MONOCYTES # BLD AUTO: 0.59 THOUSAND/UL (ref 0–1.22)
MONOCYTES NFR BLD: 11 % (ref 4–12)
MYELOCYTE ABSOLUTE CT: 0.05 THOUSAND/UL (ref 0–0.1)
MYELOCYTES NFR BLD MANUAL: 1 % (ref 0–1)
NEUTROPHILS # BLD MANUAL: 4.05 THOUSAND/UL (ref 1.85–7.62)
NEUTS BAND NFR BLD MANUAL: 4 % (ref 0–8)
NEUTS SEG NFR BLD AUTO: 71 % (ref 43–75)
PLATELET # BLD AUTO: 218 THOUSANDS/UL (ref 149–390)
PLATELET BLD QL SMEAR: ADEQUATE
PMV BLD AUTO: 10 FL (ref 8.9–12.7)
POTASSIUM SERPL-SCNC: 4.5 MMOL/L (ref 3.5–5.3)
PROT SERPL-MCNC: 7.5 G/DL (ref 6.4–8.4)
RBC # BLD AUTO: 2.94 MILLION/UL (ref 3.81–5.12)
RBC MORPH BLD: PRESENT
SODIUM SERPL-SCNC: 134 MMOL/L (ref 135–147)
VARIANT LYMPHS # BLD AUTO: 1 %
WBC # BLD AUTO: 5.4 THOUSAND/UL (ref 4.31–10.16)

## 2025-08-01 PROCEDURE — 80053 COMPREHEN METABOLIC PANEL: CPT | Performed by: INTERNAL MEDICINE

## 2025-08-01 PROCEDURE — 85027 COMPLETE CBC AUTOMATED: CPT | Performed by: INTERNAL MEDICINE

## 2025-08-01 PROCEDURE — 83735 ASSAY OF MAGNESIUM: CPT | Performed by: INTERNAL MEDICINE

## 2025-08-01 PROCEDURE — 96374 THER/PROPH/DIAG INJ IV PUSH: CPT

## 2025-08-01 PROCEDURE — 96361 HYDRATE IV INFUSION ADD-ON: CPT

## 2025-08-01 PROCEDURE — 85007 BL SMEAR W/DIFF WBC COUNT: CPT | Performed by: INTERNAL MEDICINE

## 2025-08-01 RX ADMIN — ALTEPLASE 2 MG: 2.2 INJECTION, POWDER, LYOPHILIZED, FOR SOLUTION INTRAVENOUS at 13:20

## 2025-08-01 RX ADMIN — ONDANSETRON 8 MG: 2 INJECTION INTRAMUSCULAR; INTRAVENOUS at 14:14

## 2025-08-01 RX ADMIN — SODIUM CHLORIDE 1000 ML: 0.9 INJECTION, SOLUTION INTRAVENOUS at 14:05

## 2025-08-04 ENCOUNTER — HOSPITAL ENCOUNTER (OUTPATIENT)
Dept: INFUSION CENTER | Facility: CLINIC | Age: 70
Discharge: HOME | End: 2025-08-04
Payer: MEDICARE

## 2025-08-04 ENCOUNTER — PATIENT OUTREACH (OUTPATIENT)
Dept: HEMATOLOGY ONCOLOGY | Facility: CLINIC | Age: 70
End: 2025-08-04

## 2025-08-04 VITALS
SYSTOLIC BLOOD PRESSURE: 126 MMHG | DIASTOLIC BLOOD PRESSURE: 85 MMHG | HEART RATE: 102 BPM | TEMPERATURE: 97.7 F | RESPIRATION RATE: 18 BRPM

## 2025-08-04 DIAGNOSIS — E86.0 DEHYDRATION: Primary | ICD-10-CM

## 2025-08-04 LAB
ALBUMIN SERPL BCG-MCNC: 3.6 G/DL (ref 3.5–5)
ALP SERPL-CCNC: 160 U/L (ref 34–104)
ALT SERPL W P-5'-P-CCNC: 9 U/L (ref 7–52)
ANION GAP SERPL CALCULATED.3IONS-SCNC: 8 MMOL/L (ref 4–13)
AST SERPL W P-5'-P-CCNC: 23 U/L (ref 13–39)
BILIRUB SERPL-MCNC: 0.31 MG/DL (ref 0.2–1)
BUN SERPL-MCNC: 23 MG/DL (ref 5–25)
CALCIUM SERPL-MCNC: 9.8 MG/DL (ref 8.4–10.2)
CHLORIDE SERPL-SCNC: 100 MMOL/L (ref 96–108)
CO2 SERPL-SCNC: 28 MMOL/L (ref 21–32)
CREAT SERPL-MCNC: 1 MG/DL (ref 0.6–1.3)
GFR SERPL CREATININE-BSD FRML MDRD: 57 ML/MIN/1.73SQ M
GLUCOSE SERPL-MCNC: 92 MG/DL (ref 65–140)
MAGNESIUM SERPL-MCNC: 1.9 MG/DL (ref 1.9–2.7)
POTASSIUM SERPL-SCNC: 4.2 MMOL/L (ref 3.5–5.3)
PROT SERPL-MCNC: 7.8 G/DL (ref 6.4–8.4)
SODIUM SERPL-SCNC: 136 MMOL/L (ref 135–147)

## 2025-08-04 PROCEDURE — 80053 COMPREHEN METABOLIC PANEL: CPT | Performed by: INTERNAL MEDICINE

## 2025-08-04 PROCEDURE — 96361 HYDRATE IV INFUSION ADD-ON: CPT

## 2025-08-04 PROCEDURE — 96374 THER/PROPH/DIAG INJ IV PUSH: CPT

## 2025-08-04 PROCEDURE — 83735 ASSAY OF MAGNESIUM: CPT | Performed by: INTERNAL MEDICINE

## 2025-08-04 RX ADMIN — ONDANSETRON 8 MG: 2 INJECTION INTRAMUSCULAR; INTRAVENOUS at 13:13

## 2025-08-04 RX ADMIN — SODIUM CHLORIDE 1000 ML: 0.9 INJECTION, SOLUTION INTRAVENOUS at 13:13

## 2025-08-06 ENCOUNTER — TELEPHONE (OUTPATIENT)
Dept: PALLIATIVE MEDICINE | Facility: CLINIC | Age: 70
End: 2025-08-06

## 2025-08-06 ENCOUNTER — HOSPITAL ENCOUNTER (OUTPATIENT)
Dept: INFUSION CENTER | Facility: CLINIC | Age: 70
Discharge: HOME | End: 2025-08-06
Payer: MEDICARE

## 2025-08-06 ENCOUNTER — OFFICE VISIT (OUTPATIENT)
Dept: PALLIATIVE MEDICINE | Facility: CLINIC | Age: 70
End: 2025-08-06
Payer: MEDICARE

## 2025-08-06 VITALS
BODY MASS INDEX: 24.66 KG/M2 | HEART RATE: 114 BPM | OXYGEN SATURATION: 99 % | TEMPERATURE: 97.9 F | SYSTOLIC BLOOD PRESSURE: 132 MMHG | WEIGHT: 153.44 LBS | DIASTOLIC BLOOD PRESSURE: 78 MMHG | HEIGHT: 66 IN

## 2025-08-06 DIAGNOSIS — C78.7 METASTATIC COLON CANCER TO LIVER (HCC): Primary | ICD-10-CM

## 2025-08-06 DIAGNOSIS — C18.9 METASTATIC COLON CANCER TO LIVER (HCC): Primary | ICD-10-CM

## 2025-08-06 DIAGNOSIS — Z51.5 PALLIATIVE CARE ENCOUNTER: ICD-10-CM

## 2025-08-06 DIAGNOSIS — G89.3 CANCER RELATED PAIN: ICD-10-CM

## 2025-08-06 DIAGNOSIS — Z90.89 STATUS POST TONSILLECTOMY: ICD-10-CM

## 2025-08-06 PROCEDURE — 99215 OFFICE O/P EST HI 40 MIN: CPT | Performed by: INTERNAL MEDICINE

## 2025-08-06 PROCEDURE — G2211 COMPLEX E/M VISIT ADD ON: HCPCS | Performed by: INTERNAL MEDICINE

## 2025-08-06 RX ORDER — OXYCODONE HCL 5 MG/5 ML
10 SOLUTION, ORAL ORAL EVERY 6 HOURS PRN
Qty: 473 ML | Refills: 0 | Status: SHIPPED | OUTPATIENT
Start: 2025-08-06

## 2025-08-07 ENCOUNTER — TELEPHONE (OUTPATIENT)
Dept: SPEECH THERAPY | Facility: CLINIC | Age: 70
End: 2025-08-07

## 2025-08-11 ENCOUNTER — NUTRITION (OUTPATIENT)
Dept: NUTRITION | Facility: CLINIC | Age: 70
End: 2025-08-11

## 2025-08-11 ENCOUNTER — HOSPITAL ENCOUNTER (OUTPATIENT)
Dept: INFUSION CENTER | Facility: CLINIC | Age: 70
Discharge: HOME | End: 2025-08-11
Payer: MEDICARE

## 2025-08-12 ENCOUNTER — TELEPHONE (OUTPATIENT)
Dept: HEMATOLOGY ONCOLOGY | Facility: CLINIC | Age: 70
End: 2025-08-12

## 2025-08-12 ENCOUNTER — TELEMEDICINE (OUTPATIENT)
Dept: HEMATOLOGY ONCOLOGY | Facility: CLINIC | Age: 70
End: 2025-08-12
Payer: MEDICARE

## 2025-08-13 ENCOUNTER — HOSPITAL ENCOUNTER (OUTPATIENT)
Dept: INFUSION CENTER | Facility: CLINIC | Age: 70
Discharge: HOME | End: 2025-08-13
Attending: INTERNAL MEDICINE
Payer: MEDICARE

## 2025-08-18 ENCOUNTER — HOSPITAL ENCOUNTER (OUTPATIENT)
Dept: INFUSION CENTER | Facility: CLINIC | Age: 70
Discharge: HOME | End: 2025-08-18
Payer: MEDICARE

## 2025-08-18 VITALS
HEART RATE: 103 BPM | RESPIRATION RATE: 18 BRPM | TEMPERATURE: 98.6 F | SYSTOLIC BLOOD PRESSURE: 129 MMHG | DIASTOLIC BLOOD PRESSURE: 85 MMHG

## 2025-08-18 DIAGNOSIS — E86.0 DEHYDRATION: Primary | ICD-10-CM

## 2025-08-18 LAB
ALBUMIN SERPL BCG-MCNC: 3.6 G/DL (ref 3.5–5)
ALP SERPL-CCNC: 221 U/L (ref 34–104)
ALT SERPL W P-5'-P-CCNC: 11 U/L (ref 7–52)
ANION GAP SERPL CALCULATED.3IONS-SCNC: 5 MMOL/L (ref 4–13)
ANISOCYTOSIS BLD QL SMEAR: PRESENT
AST SERPL W P-5'-P-CCNC: 26 U/L (ref 13–39)
BASOPHILS # BLD MANUAL: 0.3 THOUSAND/UL (ref 0–0.1)
BASOPHILS NFR MAR MANUAL: 1 % (ref 0–1)
BILIRUB SERPL-MCNC: 0.26 MG/DL (ref 0.2–1)
BUN SERPL-MCNC: 24 MG/DL (ref 5–25)
CALCIUM SERPL-MCNC: 9.8 MG/DL (ref 8.4–10.2)
CHLORIDE SERPL-SCNC: 99 MMOL/L (ref 96–108)
CO2 SERPL-SCNC: 30 MMOL/L (ref 21–32)
CREAT SERPL-MCNC: 0.97 MG/DL (ref 0.6–1.3)
EOSINOPHIL # BLD MANUAL: 0.89 THOUSAND/UL (ref 0–0.4)
EOSINOPHIL NFR BLD MANUAL: 3 % (ref 0–6)
ERYTHROCYTE [DISTWIDTH] IN BLOOD BY AUTOMATED COUNT: 17.3 % (ref 11.6–15.1)
GFR SERPL CREATININE-BSD FRML MDRD: 59 ML/MIN/1.73SQ M
GLUCOSE SERPL-MCNC: 103 MG/DL (ref 65–140)
HCT VFR BLD AUTO: 30.1 % (ref 34.8–46.1)
HGB BLD-MCNC: 9.8 G/DL (ref 11.5–15.4)
LYMPHOCYTES # BLD AUTO: 1.18 THOUSAND/UL (ref 0.6–4.47)
LYMPHOCYTES # BLD AUTO: 4 % (ref 14–44)
MACROCYTES BLD QL AUTO: PRESENT
MAGNESIUM SERPL-MCNC: 2 MG/DL (ref 1.9–2.7)
MCH RBC QN AUTO: 31.2 PG (ref 26.8–34.3)
MCHC RBC AUTO-ENTMCNC: 32.6 G/DL (ref 31.4–37.4)
MCV RBC AUTO: 96 FL (ref 82–98)
MONOCYTES # BLD AUTO: 2.66 THOUSAND/UL (ref 0–1.22)
MONOCYTES NFR BLD: 9 % (ref 4–12)
NEUTROPHILS # BLD MANUAL: 24.49 THOUSAND/UL (ref 1.85–7.62)
NEUTS BAND NFR BLD MANUAL: 4 % (ref 0–8)
NEUTS SEG NFR BLD AUTO: 79 % (ref 43–75)
NRBC BLD AUTO-RTO: 2 /100 WBC (ref 0–2)
PLATELET # BLD AUTO: 340 THOUSANDS/UL (ref 149–390)
PLATELET BLD QL SMEAR: ABNORMAL
PLATELET CLUMP BLD QL SMEAR: PRESENT
PMV BLD AUTO: 11.1 FL (ref 8.9–12.7)
POTASSIUM SERPL-SCNC: 4.1 MMOL/L (ref 3.5–5.3)
PROT SERPL-MCNC: 8.1 G/DL (ref 6.4–8.4)
RBC # BLD AUTO: 3.14 MILLION/UL (ref 3.81–5.12)
RBC MORPH BLD: PRESENT
SODIUM SERPL-SCNC: 134 MMOL/L (ref 135–147)
WBC # BLD AUTO: 29.5 THOUSAND/UL (ref 4.31–10.16)

## 2025-08-18 PROCEDURE — 85007 BL SMEAR W/DIFF WBC COUNT: CPT | Performed by: INTERNAL MEDICINE

## 2025-08-18 PROCEDURE — 80053 COMPREHEN METABOLIC PANEL: CPT | Performed by: INTERNAL MEDICINE

## 2025-08-18 PROCEDURE — 83735 ASSAY OF MAGNESIUM: CPT | Performed by: INTERNAL MEDICINE

## 2025-08-18 PROCEDURE — 85027 COMPLETE CBC AUTOMATED: CPT | Performed by: INTERNAL MEDICINE

## 2025-08-18 PROCEDURE — 96361 HYDRATE IV INFUSION ADD-ON: CPT

## 2025-08-18 PROCEDURE — 96374 THER/PROPH/DIAG INJ IV PUSH: CPT

## 2025-08-18 RX ADMIN — SODIUM CHLORIDE 1000 ML: 0.9 INJECTION, SOLUTION INTRAVENOUS at 13:09

## 2025-08-18 RX ADMIN — ONDANSETRON 8 MG: 2 INJECTION INTRAMUSCULAR; INTRAVENOUS at 13:09

## 2025-08-21 DIAGNOSIS — C78.7 METASTATIC COLON CANCER TO LIVER (HCC): Primary | ICD-10-CM

## 2025-08-21 DIAGNOSIS — C18.9 METASTATIC COLON CANCER TO LIVER (HCC): Primary | ICD-10-CM

## (undated) DEVICE — SYRINGE 10ML LL CONTROL TOP

## (undated) DEVICE — NEEDLE 23G X 1 1/2 SAFETY-GLIDE THIN WALL

## (undated) DEVICE — TIP-UP FENESTRATED GRASPER: Brand: ENDOWRIST

## (undated) DEVICE — NEEDLE 25G X 1 1/2

## (undated) DEVICE — STERILE BETHLEHEM T AND A PACK: Brand: CARDINAL HEALTH

## (undated) DEVICE — YANKAUER,OPEN TIP, NO VENT: Brand: ARGYLE

## (undated) DEVICE — AIRSEAL TUBE SMOKE EVAC LUMENX3 FILTERED

## (undated) DEVICE — HEMOSTATIC MATRIX SURGIFLO 8ML W/THROMBIN

## (undated) DEVICE — ELECTRO LUBE IS A SINGLE PATIENT USE DEVICE THAT IS INTENDED TO BE USED ON ELECTROSURGICAL ELECTRODES TO REDUCE STICKING.: Brand: KEY SURGICAL ELECTRO LUBE

## (undated) DEVICE — ANTI-FOG SOLUTION WITH FOAM PAD: Brand: DEVON

## (undated) DEVICE — NEEDLE BLUNT 18 G X 1 1/2IN

## (undated) DEVICE — TELFA NON-ADHERENT ABSORBENT DRESSING: Brand: TELFA

## (undated) DEVICE — SURGICEL 4 X 8IN

## (undated) DEVICE — BULB SYRINGE,IRRIGATION WITH PROTECTIVE CAP: Brand: DOVER

## (undated) DEVICE — TUBING SUCTION 5MM X 12 FT

## (undated) DEVICE — COLUMN DRAPE

## (undated) DEVICE — CLAMP TOWEL TUBING DISPOSABLE

## (undated) DEVICE — ELECTRODE BLADE MOD E-Z CLEAN 2.5IN 6.4CM -0012M

## (undated) DEVICE — STERILE POLYISOPRENE POWDER-FREE SURGICAL GLOVES: Brand: PROTEXIS

## (undated) DEVICE — SPONGE LAP STERILE 18X18

## (undated) DEVICE — CATH URET 12FR RED RUBBER

## (undated) DEVICE — SYRINGE 30ML LL

## (undated) DEVICE — INTENDED FOR TISSUE SEPARATION, AND OTHER PROCEDURES THAT REQUIRE A SHARP SURGICAL BLADE TO PUNCTURE OR CUT.: Brand: BARD-PARKER SAFETY BLADES SIZE 15, STERILE

## (undated) DEVICE — SUT MONOCRYL 4-0 PS-2 18 IN Y496G

## (undated) DEVICE — SURGIFLO ENDOSCOPIC APPICATOR: Brand: ETHICON

## (undated) DEVICE — INTENDED FOR TISSUE SEPARATION, AND OTHER PROCEDURES THAT REQUIRE A SHARP SURGICAL BLADE TO PUNCTURE OR CUT.: Brand: BARD-PARKER SAFETY BLADES SIZE 11, STERILE

## (undated) DEVICE — STERILE POLYISOPRENE POWDER-FREE SURGICAL GLOVES WITH EMOLLIENT COATING: Brand: PROTEXIS

## (undated) DEVICE — TRAY FOLEY 16FR URIMETER SURESTEP

## (undated) DEVICE — X-RAY DETECTABLE SPONGES,16 PLY: Brand: VISTEC

## (undated) DEVICE — STERILIZATION TEETH GUARDS

## (undated) DEVICE — Device: Brand: OMNICLOSE TROCAR SITE CLOSURE DEVICE

## (undated) DEVICE — NEEDLE 18 G X 1 1/2

## (undated) DEVICE — TROCAR: Brand: KII FIOS FIRST ENTRY

## (undated) DEVICE — NDL CNTR 20CT FM MAG: Brand: MEDLINE INDUSTRIES, INC.

## (undated) DEVICE — PROVE COVER: Brand: UNBRANDED

## (undated) DEVICE — VISUALIZATION SYSTEM: Brand: CLEARIFY

## (undated) DEVICE — GLOVE SRG BIOGEL 6.5

## (undated) DEVICE — GLOVE SRG BIOGEL 7.5

## (undated) DEVICE — STAPLER 60 RELOAD BLUE: Brand: SUREFORM

## (undated) DEVICE — ARM DRAPE

## (undated) DEVICE — GLOVE SRG BIOGEL ORTHOPEDIC 7

## (undated) DEVICE — SYNCHROSEAL: Brand: DA VINCI ENERGY

## (undated) DEVICE — 3M™ STERI-STRIP™ REINFORCED ADHESIVE SKIN CLOSURES, R1546, 1/4 IN X 4 IN (6 MM X 100 MM), 10 STRIPS/ENVELOPE: Brand: 3M™ STERI-STRIP™

## (undated) DEVICE — CHLORAPREP HI-LITE 26ML ORANGE

## (undated) DEVICE — PENCIL ELECTROSURG E-Z CLEAN -0035H

## (undated) DEVICE — NEURO PATTIES 1/2 X 3

## (undated) DEVICE — SPECIMEN CONTAINER STERILE PEEL PACK

## (undated) DEVICE — CHEST/BREAST DRAPE: Brand: CONVERTORS

## (undated) DEVICE — SUT MONOCRYL 4-0 PS-2 27 IN Y426H

## (undated) DEVICE — CANNULA SEAL

## (undated) DEVICE — BASIC PACK: Brand: CONVERTORS

## (undated) DEVICE — SUT VLOC 90 3-0 V-20 9IN VLOCM0644

## (undated) DEVICE — WOUND RETRACTOR AND PROTECTOR: Brand: ALEXIS O WOUND PROTECTOR-RETRACTOR

## (undated) DEVICE — ADHESIVE SKIN HIGH VISCOSITY EXOFIN 1ML

## (undated) DEVICE — SUT SILK 3-0 SH CR/8 18 IN C013D

## (undated) DEVICE — BETHLEHEM MAJOR GENERAL PACK: Brand: CARDINAL HEALTH

## (undated) DEVICE — SKIN MARKER DUAL TIP WITH RULER CAP, FLEXIBLE RULER AND LABELS: Brand: DEVON

## (undated) DEVICE — SYRINGE 5ML LL

## (undated) DEVICE — GAUZE SPONGES,16 PLY: Brand: CURITY

## (undated) DEVICE — 4-PORT MANIFOLD: Brand: NEPTUNE 2

## (undated) DEVICE — DRAPE SHEET THREE QUARTER

## (undated) DEVICE — BETHLEHEM UNIVERSAL OUTPATIENT: Brand: CARDINAL HEALTH

## (undated) DEVICE — SUT PDS PLUS 1 CTX 36IN PDP371T

## (undated) DEVICE — MASTISOL LIQ ADHESIVE 2/3ML

## (undated) DEVICE — 3M™ IOBAN™ 2 ANTIMICROBIAL INCISE DRAPE 6650EZ: Brand: IOBAN™ 2

## (undated) DEVICE — NEEDLE 18 G X 1 1/2 SAFETY

## (undated) DEVICE — STAPLER 60: Brand: SUREFORM

## (undated) DEVICE — 3M™ STERI-STRIP™ REINFORCED ADHESIVE SKIN CLOSURES, R1547, 1/2 IN X 4 IN (12 MM X 100 MM), 6 STRIPS/ENVELOPE: Brand: 3M™ STERI-STRIP™

## (undated) DEVICE — SUT VICRYL 2-0 REEL 54 IN J286G

## (undated) DEVICE — VIOLET BRAIDED (POLYGLACTIN 910), SYNTHETIC ABSORBABLE SUTURE: Brand: COATED VICRYL

## (undated) DEVICE — SUT VICRYL 4-0 PS-2 18 IN J496G

## (undated) DEVICE — SUT VICRYL PLUS 0 UR-6 27IN VCP603H

## (undated) DEVICE — LIGACLIP MCA MULTIPLE CLIP APPLIERS, 20 LARGE CLIPS: Brand: LIGACLIP

## (undated) DEVICE — AIRLIFE™ TRI-FLO™ SUCTION CATHETER WITH CONTROL PORT: Brand: AIRLIFE™

## (undated) DEVICE — SYRINGE 10ML LL

## (undated) DEVICE — PAD GROUNDING ADULT

## (undated) DEVICE — ULTRASOUND GEL STERILE FOIL PK

## (undated) DEVICE — 3M™ TEGADERM™ TRANSPARENT FILM DRESSING FRAME STYLE, 1626W, 4 IN X 4-3/4 IN (10 CM X 12 CM), 50/CT 4CT/CASE: Brand: 3M™ TEGADERM™

## (undated) DEVICE — STAPLER 60 RELOAD WHITE: Brand: SUREFORM

## (undated) DEVICE — FENESTRATED BIPOLAR FORCEPS: Brand: ENDOWRIST

## (undated) DEVICE — 40601 PROLONGED POSITIONING SYSTEM: Brand: 40601 PROLONGED POSITIONING SYSTEM

## (undated) DEVICE — WAND COBLATION  PROCISE XP TONSIL

## (undated) DEVICE — SUT SILK 2-0 SH 30 IN K833H

## (undated) DEVICE — SYRINGE 3ML LL

## (undated) DEVICE — HEAVY DUTY TABLE COVER: Brand: CONVERTORS

## (undated) DEVICE — BLADELESS OBTURATOR: Brand: WECK VISTA